# Patient Record
Sex: FEMALE | Race: WHITE | Employment: OTHER | URBAN - METROPOLITAN AREA
[De-identification: names, ages, dates, MRNs, and addresses within clinical notes are randomized per-mention and may not be internally consistent; named-entity substitution may affect disease eponyms.]

---

## 2017-01-03 ENCOUNTER — TELEPHONE (OUTPATIENT)
Dept: FAMILY MEDICINE CLINIC | Age: 72
End: 2017-01-03

## 2017-01-03 RX ORDER — ALBUTEROL SULFATE 90 UG/1
2 AEROSOL, METERED RESPIRATORY (INHALATION) EVERY 6 HOURS PRN
Qty: 3 INHALER | Refills: 3 | Status: SHIPPED | OUTPATIENT
Start: 2017-01-03 | End: 2020-03-12 | Stop reason: SDUPTHER

## 2017-01-09 DIAGNOSIS — K52.81 EOSINOPHILIC GASTRITIS: ICD-10-CM

## 2017-01-09 RX ORDER — PREDNISONE 10 MG/1
10 TABLET ORAL PRN
Qty: 100 TABLET | Refills: 3 | Status: CANCELLED | OUTPATIENT
Start: 2017-01-09

## 2017-01-10 RX ORDER — MONTELUKAST SODIUM 10 MG/1
10 TABLET ORAL NIGHTLY
Qty: 90 TABLET | Refills: 0 | Status: SHIPPED | OUTPATIENT
Start: 2017-01-10 | End: 2017-03-17 | Stop reason: SDUPTHER

## 2017-01-10 RX ORDER — WARFARIN SODIUM 5 MG/1
TABLET ORAL
Qty: 180 TABLET | Refills: 0 | Status: SHIPPED | OUTPATIENT
Start: 2017-01-10 | End: 2017-03-17 | Stop reason: SDUPTHER

## 2017-01-10 RX ORDER — POLYETHYLENE GLYCOL 3350 17 G/17G
17 POWDER, FOR SOLUTION ORAL DAILY
Qty: 3 BOTTLE | Refills: 0 | Status: SHIPPED | OUTPATIENT
Start: 2017-01-10 | End: 2017-03-17 | Stop reason: SDUPTHER

## 2017-01-18 ENCOUNTER — TELEPHONE (OUTPATIENT)
Dept: FAMILY MEDICINE CLINIC | Age: 72
End: 2017-01-18

## 2017-01-18 DIAGNOSIS — K52.81 EOSINOPHILIC GASTRITIS: ICD-10-CM

## 2017-01-18 RX ORDER — PREDNISONE 10 MG/1
10 TABLET ORAL PRN
Qty: 100 TABLET | Refills: 3 | Status: SHIPPED | OUTPATIENT
Start: 2017-01-18 | End: 2017-12-28 | Stop reason: SDUPTHER

## 2017-01-31 ENCOUNTER — OFFICE VISIT (OUTPATIENT)
Dept: FAMILY MEDICINE CLINIC | Age: 72
End: 2017-01-31

## 2017-01-31 VITALS
SYSTOLIC BLOOD PRESSURE: 134 MMHG | HEART RATE: 90 BPM | RESPIRATION RATE: 12 BRPM | DIASTOLIC BLOOD PRESSURE: 70 MMHG | OXYGEN SATURATION: 98 %

## 2017-01-31 DIAGNOSIS — E11.9 TYPE 2 DIABETES MELLITUS WITHOUT COMPLICATION, WITHOUT LONG-TERM CURRENT USE OF INSULIN (HCC): ICD-10-CM

## 2017-01-31 DIAGNOSIS — M71.22 BAKER CYST, LEFT: Primary | ICD-10-CM

## 2017-01-31 PROCEDURE — 99213 OFFICE O/P EST LOW 20 MIN: CPT | Performed by: FAMILY MEDICINE

## 2017-01-31 RX ORDER — PREDNISONE 10 MG/1
TABLET ORAL
Qty: 33 TABLET | Refills: 0
Start: 2017-01-31 | End: 2017-02-05

## 2017-02-07 ENCOUNTER — HOSPITAL ENCOUNTER (OUTPATIENT)
Dept: ENDOSCOPY | Age: 72
Discharge: HOME | End: 2017-02-07
Attending: INTERNAL MEDICINE | Admitting: INTERNAL MEDICINE

## 2017-02-07 LAB
ANION GAP SERPL CALCULATED.3IONS-SCNC: 16 MMOL/L (ref 3–16)
BUN BLDV-MCNC: 19 MG/DL (ref 7–20)
CALCIUM SERPL-MCNC: 9.4 MG/DL (ref 8.3–10.6)
CHLORIDE BLD-SCNC: 100 MMOL/L (ref 99–110)
CO2: 23 MMOL/L (ref 21–32)
CREAT SERPL-MCNC: 0.7 MG/DL (ref 0.6–1.2)
GFR AFRICAN AMERICAN: >60
GFR NON-AFRICAN AMERICAN: >60
GLUCOSE BLD-MCNC: 161 MG/DL (ref 70–99)
GLUCOSE BLD-MCNC: 168 MG/DL (ref 70–99)
GLUCOSE BLD-MCNC: 189 MG/DL (ref 70–99)
INR BLD: 0.97 (ref 0.85–1.16)
PERFORMED ON: ABNORMAL
PERFORMED ON: ABNORMAL
POTASSIUM SERPL-SCNC: 3.7 MMOL/L (ref 3.5–5.1)
PROTHROMBIN TIME: 11 SEC (ref 9.8–13)
SODIUM BLD-SCNC: 139 MMOL/L (ref 136–145)

## 2017-02-07 RX ORDER — FENTANYL CITRATE 50 UG/ML
50 INJECTION, SOLUTION INTRAMUSCULAR; INTRAVENOUS EVERY 5 MIN PRN
Status: DISCONTINUED | OUTPATIENT
Start: 2017-02-07 | End: 2017-02-08 | Stop reason: HOSPADM

## 2017-02-07 RX ORDER — MEPERIDINE HYDROCHLORIDE 25 MG/ML
12.5 INJECTION INTRAMUSCULAR; INTRAVENOUS; SUBCUTANEOUS EVERY 5 MIN PRN
Status: DISCONTINUED | OUTPATIENT
Start: 2017-02-07 | End: 2017-02-08 | Stop reason: HOSPADM

## 2017-02-07 RX ORDER — PROMETHAZINE HYDROCHLORIDE 25 MG/ML
6.25 INJECTION, SOLUTION INTRAMUSCULAR; INTRAVENOUS PRN
Status: DISCONTINUED | OUTPATIENT
Start: 2017-02-07 | End: 2017-02-08 | Stop reason: HOSPADM

## 2017-02-07 RX ORDER — SODIUM CHLORIDE 0.9 % (FLUSH) 0.9 %
10 SYRINGE (ML) INJECTION PRN
Status: DISCONTINUED | OUTPATIENT
Start: 2017-02-07 | End: 2017-02-08 | Stop reason: HOSPADM

## 2017-02-07 RX ORDER — OXYCODONE HYDROCHLORIDE 5 MG/1
5 TABLET ORAL PRN
Status: ACTIVE | OUTPATIENT
Start: 2017-02-07 | End: 2017-02-07

## 2017-02-07 RX ORDER — SODIUM CHLORIDE 9 MG/ML
INJECTION, SOLUTION INTRAVENOUS CONTINUOUS
Status: DISCONTINUED | OUTPATIENT
Start: 2017-02-07 | End: 2017-02-08 | Stop reason: HOSPADM

## 2017-02-07 RX ORDER — HYDROMORPHONE HCL 110MG/55ML
0.25 PATIENT CONTROLLED ANALGESIA SYRINGE INTRAVENOUS EVERY 5 MIN PRN
Status: DISCONTINUED | OUTPATIENT
Start: 2017-02-07 | End: 2017-02-08 | Stop reason: HOSPADM

## 2017-02-07 RX ORDER — DIPHENHYDRAMINE HYDROCHLORIDE 50 MG/ML
12.5 INJECTION INTRAMUSCULAR; INTRAVENOUS
Status: ACTIVE | OUTPATIENT
Start: 2017-02-07 | End: 2017-02-07

## 2017-02-07 RX ORDER — LABETALOL HYDROCHLORIDE 5 MG/ML
5 INJECTION, SOLUTION INTRAVENOUS EVERY 10 MIN PRN
Status: DISCONTINUED | OUTPATIENT
Start: 2017-02-07 | End: 2017-02-08 | Stop reason: HOSPADM

## 2017-02-07 RX ORDER — SODIUM CHLORIDE 0.9 % (FLUSH) 0.9 %
10 SYRINGE (ML) INJECTION EVERY 12 HOURS SCHEDULED
Status: DISCONTINUED | OUTPATIENT
Start: 2017-02-07 | End: 2017-02-08 | Stop reason: HOSPADM

## 2017-02-07 RX ORDER — OXYCODONE HYDROCHLORIDE 5 MG/1
10 TABLET ORAL PRN
Status: ACTIVE | OUTPATIENT
Start: 2017-02-07 | End: 2017-02-07

## 2017-02-07 RX ORDER — HYDROMORPHONE HCL 110MG/55ML
0.5 PATIENT CONTROLLED ANALGESIA SYRINGE INTRAVENOUS EVERY 5 MIN PRN
Status: DISCONTINUED | OUTPATIENT
Start: 2017-02-07 | End: 2017-02-08 | Stop reason: HOSPADM

## 2017-02-07 ASSESSMENT — ENCOUNTER SYMPTOMS: SHORTNESS OF BREATH: 0

## 2017-02-23 ENCOUNTER — TELEPHONE (OUTPATIENT)
Dept: FAMILY MEDICINE CLINIC | Age: 72
End: 2017-02-23

## 2017-03-17 RX ORDER — WARFARIN SODIUM 5 MG/1
TABLET ORAL
Qty: 180 TABLET | Refills: 1 | Status: SHIPPED | OUTPATIENT
Start: 2017-03-17 | End: 2017-12-28 | Stop reason: SDUPTHER

## 2017-03-17 RX ORDER — INSULIN DETEMIR 100 [IU]/ML
INJECTION, SOLUTION SUBCUTANEOUS
Qty: 90 ML | Refills: 1 | Status: SHIPPED | OUTPATIENT
Start: 2017-03-17 | End: 2017-08-28

## 2017-03-17 RX ORDER — POLYETHYLENE GLYCOL 3350 17 G/17G
POWDER, FOR SOLUTION ORAL
Qty: 1581 G | Refills: 1 | Status: SHIPPED | OUTPATIENT
Start: 2017-03-17 | End: 2017-12-28 | Stop reason: SDUPTHER

## 2017-03-17 RX ORDER — MONTELUKAST SODIUM 10 MG/1
TABLET ORAL
Qty: 90 TABLET | Refills: 1 | Status: SHIPPED | OUTPATIENT
Start: 2017-03-17 | End: 2017-12-28 | Stop reason: SDUPTHER

## 2017-04-27 ENCOUNTER — OFFICE VISIT (OUTPATIENT)
Dept: FAMILY MEDICINE CLINIC | Age: 72
End: 2017-04-27

## 2017-04-27 VITALS
HEIGHT: 67 IN | DIASTOLIC BLOOD PRESSURE: 74 MMHG | WEIGHT: 227.6 LBS | SYSTOLIC BLOOD PRESSURE: 138 MMHG | HEART RATE: 92 BPM | BODY MASS INDEX: 35.72 KG/M2 | OXYGEN SATURATION: 96 %

## 2017-04-27 DIAGNOSIS — M71.22 BAKER CYST, LEFT: ICD-10-CM

## 2017-04-27 DIAGNOSIS — K52.81 EOSINOPHILIC GASTRITIS: ICD-10-CM

## 2017-04-27 DIAGNOSIS — I47.1 PAROXYSMAL SVT (SUPRAVENTRICULAR TACHYCARDIA) (HCC): ICD-10-CM

## 2017-04-27 DIAGNOSIS — Z79.01 LONG TERM CURRENT USE OF ANTICOAGULANT: ICD-10-CM

## 2017-04-27 DIAGNOSIS — M15.9 GENERALIZED OSTEOARTHROSIS, INVOLVING MULTIPLE SITES: ICD-10-CM

## 2017-04-27 DIAGNOSIS — Z79.4 TYPE 2 DIABETES MELLITUS WITH HYPERGLYCEMIA, WITH LONG-TERM CURRENT USE OF INSULIN (HCC): Primary | ICD-10-CM

## 2017-04-27 DIAGNOSIS — E11.65 TYPE 2 DIABETES MELLITUS WITH HYPERGLYCEMIA, WITH LONG-TERM CURRENT USE OF INSULIN (HCC): Primary | ICD-10-CM

## 2017-04-27 LAB
INTERNATIONAL NORMALIZATION RATIO, POC: 2.5
PROTHROMBIN TIME, POC: NORMAL

## 2017-04-27 PROCEDURE — G8510 SCR DEP NEG, NO PLAN REQD: HCPCS | Performed by: FAMILY MEDICINE

## 2017-04-27 PROCEDURE — 99214 OFFICE O/P EST MOD 30 MIN: CPT | Performed by: FAMILY MEDICINE

## 2017-04-27 PROCEDURE — 85610 PROTHROMBIN TIME: CPT | Performed by: FAMILY MEDICINE

## 2017-04-27 PROCEDURE — 3288F FALL RISK ASSESSMENT DOCD: CPT | Performed by: FAMILY MEDICINE

## 2017-04-27 RX ORDER — BLOOD-GLUCOSE METER
EACH MISCELLANEOUS
Qty: 1 DEVICE | Refills: 3 | Status: SHIPPED | OUTPATIENT
Start: 2017-04-27 | End: 2017-08-28 | Stop reason: SDUPTHER

## 2017-04-27 RX ORDER — CELECOXIB 200 MG/1
200 CAPSULE ORAL DAILY
Qty: 30 CAPSULE | Refills: 3 | Status: SHIPPED | OUTPATIENT
Start: 2017-04-27 | End: 2018-01-17 | Stop reason: ALTCHOICE

## 2017-04-27 ASSESSMENT — PATIENT HEALTH QUESTIONNAIRE - PHQ9
1. LITTLE INTEREST OR PLEASURE IN DOING THINGS: 0
SUM OF ALL RESPONSES TO PHQ9 QUESTIONS 1 & 2: 0
2. FEELING DOWN, DEPRESSED OR HOPELESS: 0
SUM OF ALL RESPONSES TO PHQ QUESTIONS 1-9: 0

## 2017-05-16 LAB
INR BLD: 2
PROTHROMBIN TIME: 20 SEC (ref 9–11.5)

## 2017-05-17 ENCOUNTER — ANTI-COAG VISIT (OUTPATIENT)
Dept: FAMILY MEDICINE CLINIC | Age: 72
End: 2017-05-17

## 2017-05-17 ENCOUNTER — TELEPHONE (OUTPATIENT)
Dept: FAMILY MEDICINE CLINIC | Age: 72
End: 2017-05-17

## 2017-05-17 LAB — INR BLD: 2

## 2017-06-07 ENCOUNTER — TELEPHONE (OUTPATIENT)
Dept: FAMILY MEDICINE CLINIC | Age: 72
End: 2017-06-07

## 2017-06-07 RX ORDER — PENICILLIN V POTASSIUM 500 MG/1
500 TABLET ORAL 3 TIMES DAILY
Qty: 30 TABLET | Refills: 0 | Status: SHIPPED | OUTPATIENT
Start: 2017-06-07 | End: 2017-06-17

## 2017-07-25 ENCOUNTER — OFFICE VISIT (OUTPATIENT)
Dept: FAMILY MEDICINE CLINIC | Age: 72
End: 2017-07-25

## 2017-07-25 VITALS
TEMPERATURE: 97.5 F | SYSTOLIC BLOOD PRESSURE: 130 MMHG | DIASTOLIC BLOOD PRESSURE: 80 MMHG | OXYGEN SATURATION: 96 % | HEART RATE: 90 BPM

## 2017-07-25 DIAGNOSIS — J40 BRONCHITIS: Primary | ICD-10-CM

## 2017-07-25 PROCEDURE — 99213 OFFICE O/P EST LOW 20 MIN: CPT | Performed by: PHYSICIAN ASSISTANT

## 2017-07-25 RX ORDER — CEPHALEXIN 500 MG/1
500 CAPSULE ORAL 3 TIMES DAILY
Qty: 30 CAPSULE | Refills: 0 | Status: SHIPPED | OUTPATIENT
Start: 2017-07-25 | End: 2017-08-28

## 2017-07-25 ASSESSMENT — ENCOUNTER SYMPTOMS
SHORTNESS OF BREATH: 0
SORE THROAT: 1
NAUSEA: 0
COUGH: 1
WHEEZING: 0
SINUS PRESSURE: 0
RHINORRHEA: 0
VOMITING: 0
TROUBLE SWALLOWING: 0

## 2017-08-28 ENCOUNTER — OFFICE VISIT (OUTPATIENT)
Dept: FAMILY MEDICINE CLINIC | Age: 72
End: 2017-08-28

## 2017-08-28 VITALS
DIASTOLIC BLOOD PRESSURE: 80 MMHG | BODY MASS INDEX: 36.25 KG/M2 | HEART RATE: 86 BPM | SYSTOLIC BLOOD PRESSURE: 136 MMHG | OXYGEN SATURATION: 96 % | WEIGHT: 228 LBS

## 2017-08-28 DIAGNOSIS — I47.1 PAROXYSMAL SVT (SUPRAVENTRICULAR TACHYCARDIA) (HCC): ICD-10-CM

## 2017-08-28 DIAGNOSIS — I10 ESSENTIAL HYPERTENSION: ICD-10-CM

## 2017-08-28 DIAGNOSIS — K52.81 EOSINOPHILIC GASTRITIS: ICD-10-CM

## 2017-08-28 DIAGNOSIS — F43.21 SITUATIONAL DEPRESSION: ICD-10-CM

## 2017-08-28 DIAGNOSIS — E11.65 TYPE 2 DIABETES MELLITUS WITH HYPERGLYCEMIA, WITH LONG-TERM CURRENT USE OF INSULIN (HCC): Primary | ICD-10-CM

## 2017-08-28 DIAGNOSIS — Z79.4 TYPE 2 DIABETES MELLITUS WITH HYPERGLYCEMIA, WITH LONG-TERM CURRENT USE OF INSULIN (HCC): Primary | ICD-10-CM

## 2017-08-28 DIAGNOSIS — M15.9 GENERALIZED OSTEOARTHROSIS, INVOLVING MULTIPLE SITES: ICD-10-CM

## 2017-08-28 PROCEDURE — 99214 OFFICE O/P EST MOD 30 MIN: CPT | Performed by: FAMILY MEDICINE

## 2017-08-28 RX ORDER — BLOOD-GLUCOSE METER
EACH MISCELLANEOUS
Qty: 1 DEVICE | Refills: 3 | Status: SHIPPED | OUTPATIENT
Start: 2017-08-28 | End: 2018-04-27

## 2017-08-28 RX ORDER — OXYCODONE HYDROCHLORIDE AND ACETAMINOPHEN 5; 325 MG/1; MG/1
1 TABLET ORAL EVERY 6 HOURS PRN
Qty: 100 TABLET | Refills: 0 | Status: SHIPPED | OUTPATIENT
Start: 2017-08-28 | End: 2017-12-28 | Stop reason: SDUPTHER

## 2017-08-28 ASSESSMENT — PATIENT HEALTH QUESTIONNAIRE - PHQ9
SUM OF ALL RESPONSES TO PHQ9 QUESTIONS 1 & 2: 0
1. LITTLE INTEREST OR PLEASURE IN DOING THINGS: 0
SUM OF ALL RESPONSES TO PHQ QUESTIONS 1-9: 0
2. FEELING DOWN, DEPRESSED OR HOPELESS: 0

## 2017-08-29 LAB
BASOPHILS ABSOLUTE: 41 CELLS/UL (ref 0–200)
BASOPHILS RELATIVE PERCENT: 0.6 %
BUN / CREAT RATIO: 16 (CALC) (ref 6–22)
BUN BLDV-MCNC: 15 MG/DL (ref 7–25)
CALCIUM SERPL-MCNC: 9.3 MG/DL (ref 8.6–10.4)
CHLORIDE BLD-SCNC: 104 MMOL/L (ref 98–110)
CO2: 26 MMOL/L (ref 20–31)
CREAT SERPL-MCNC: 0.94 MG/DL (ref 0.6–0.93)
DIGOXIN LEVEL: 0.7 MCG/L (ref 0.8–2)
EOSINOPHILS ABSOLUTE: 511 CELLS/UL (ref 15–500)
EOSINOPHILS RELATIVE PERCENT: 7.4 %
GFR AFRICAN AMERICAN: 71 ML/MIN/1.73M2
GFR SERPL CREATININE-BSD FRML MDRD: 61 ML/MIN/1.73M2
GLUCOSE BLD-MCNC: 192 MG/DL (ref 65–99)
HBA1C MFR BLD: 9.4 % OF TOTAL HGB
HCT VFR BLD CALC: 42.1 % (ref 35–45)
HEMOGLOBIN: 14.6 G/DL (ref 11.7–15.5)
LYMPHOCYTES ABSOLUTE: 2098 CELLS/UL (ref 850–3900)
LYMPHOCYTES RELATIVE PERCENT: 30.4 %
MCH RBC QN AUTO: 30.5 PG (ref 27–33)
MCHC RBC AUTO-ENTMCNC: 34.7 G/DL (ref 32–36)
MCV RBC AUTO: 88.1 FL (ref 80–100)
MONOCYTES ABSOLUTE: 531 CELLS/UL (ref 200–950)
MONOCYTES RELATIVE PERCENT: 7.7 %
NEUTROPHILS ABSOLUTE: 3719 CELLS/UL (ref 1500–7800)
PDW BLD-RTO: 13.3 % (ref 11–15)
PLATELET # BLD: 209 THOUSAND/UL (ref 140–400)
PMV BLD AUTO: 12 FL (ref 7.5–12.5)
POTASSIUM SERPL-SCNC: 4.1 MMOL/L (ref 3.5–5.3)
RBC # BLD: 4.78 MILLION/UL (ref 3.8–5.1)
SEGMENTED NEUTROPHILS RELATIVE PERCENT: 53.9 %
SODIUM BLD-SCNC: 140 MMOL/L (ref 135–146)
WBC # BLD: 6.9 THOUSAND/UL (ref 3.8–10.8)

## 2017-12-28 ENCOUNTER — OFFICE VISIT (OUTPATIENT)
Dept: FAMILY MEDICINE CLINIC | Age: 72
End: 2017-12-28

## 2017-12-28 VITALS
HEIGHT: 67 IN | RESPIRATION RATE: 16 BRPM | HEART RATE: 92 BPM | SYSTOLIC BLOOD PRESSURE: 130 MMHG | OXYGEN SATURATION: 96 % | BODY MASS INDEX: 36.79 KG/M2 | DIASTOLIC BLOOD PRESSURE: 80 MMHG | WEIGHT: 234.38 LBS

## 2017-12-28 DIAGNOSIS — E78.5 HYPERLIPIDEMIA, UNSPECIFIED HYPERLIPIDEMIA TYPE: ICD-10-CM

## 2017-12-28 DIAGNOSIS — Z79.01 LONG TERM CURRENT USE OF ANTICOAGULANT: ICD-10-CM

## 2017-12-28 DIAGNOSIS — Z79.4 TYPE 2 DIABETES MELLITUS WITH HYPERGLYCEMIA, WITH LONG-TERM CURRENT USE OF INSULIN (HCC): Primary | ICD-10-CM

## 2017-12-28 DIAGNOSIS — M15.9 GENERALIZED OSTEOARTHROSIS, INVOLVING MULTIPLE SITES: ICD-10-CM

## 2017-12-28 DIAGNOSIS — K52.81 EOSINOPHILIC GASTRITIS: ICD-10-CM

## 2017-12-28 DIAGNOSIS — E11.65 TYPE 2 DIABETES MELLITUS WITH HYPERGLYCEMIA, WITH LONG-TERM CURRENT USE OF INSULIN (HCC): Primary | ICD-10-CM

## 2017-12-28 DIAGNOSIS — I10 ESSENTIAL HYPERTENSION: ICD-10-CM

## 2017-12-28 LAB
INTERNATIONAL NORMALIZATION RATIO, POC: 2.1
PROTHROMBIN TIME, POC: NORMAL

## 2017-12-28 PROCEDURE — 85610 PROTHROMBIN TIME: CPT | Performed by: FAMILY MEDICINE

## 2017-12-28 PROCEDURE — 99214 OFFICE O/P EST MOD 30 MIN: CPT | Performed by: FAMILY MEDICINE

## 2017-12-28 RX ORDER — PREDNISONE 10 MG/1
10 TABLET ORAL PRN
Qty: 100 TABLET | Refills: 3 | Status: ON HOLD | OUTPATIENT
Start: 2017-12-28 | End: 2018-10-11 | Stop reason: HOSPADM

## 2017-12-28 RX ORDER — DIGOXIN 250 MCG
375 TABLET ORAL DAILY
Qty: 145 TABLET | Refills: 3 | Status: ON HOLD | OUTPATIENT
Start: 2017-12-28 | End: 2018-09-04 | Stop reason: HOSPADM

## 2017-12-28 RX ORDER — HYDROCHLOROTHIAZIDE 25 MG/1
25 TABLET ORAL DAILY
Qty: 90 TABLET | Refills: 3 | Status: ON HOLD | OUTPATIENT
Start: 2017-12-28 | End: 2018-08-30 | Stop reason: HOSPADM

## 2017-12-28 RX ORDER — SPIRONOLACTONE 50 MG/1
50 TABLET, FILM COATED ORAL DAILY
Qty: 90 TABLET | Refills: 3 | Status: ON HOLD | OUTPATIENT
Start: 2017-12-28 | End: 2018-08-30 | Stop reason: HOSPADM

## 2017-12-28 RX ORDER — OXYCODONE HYDROCHLORIDE AND ACETAMINOPHEN 5; 325 MG/1; MG/1
1 TABLET ORAL EVERY 6 HOURS PRN
Qty: 100 TABLET | Refills: 0 | Status: SHIPPED | OUTPATIENT
Start: 2017-12-28 | End: 2018-01-04

## 2017-12-28 RX ORDER — MONTELUKAST SODIUM 10 MG/1
TABLET ORAL
Qty: 90 TABLET | Refills: 1 | Status: SHIPPED | OUTPATIENT
Start: 2017-12-28 | End: 2018-04-27 | Stop reason: SDUPTHER

## 2017-12-28 RX ORDER — POLYETHYLENE GLYCOL 3350 17 G/17G
POWDER, FOR SOLUTION ORAL
Qty: 1581 G | Refills: 3 | Status: SHIPPED | OUTPATIENT
Start: 2017-12-28 | End: 2019-01-29 | Stop reason: SDUPTHER

## 2017-12-28 RX ORDER — WARFARIN SODIUM 5 MG/1
TABLET ORAL
Qty: 180 TABLET | Refills: 1 | Status: SHIPPED | OUTPATIENT
Start: 2017-12-28 | End: 2018-04-27 | Stop reason: SDUPTHER

## 2017-12-28 RX ORDER — DILTIAZEM HYDROCHLORIDE 360 MG/1
360 CAPSULE, EXTENDED RELEASE ORAL DAILY
Qty: 90 CAPSULE | Refills: 3 | Status: ON HOLD | OUTPATIENT
Start: 2017-12-28 | End: 2018-09-04 | Stop reason: HOSPADM

## 2017-12-28 RX ORDER — PRAVASTATIN SODIUM 40 MG
40 TABLET ORAL NIGHTLY
Qty: 90 TABLET | Refills: 3 | Status: SHIPPED | OUTPATIENT
Start: 2017-12-28 | End: 2018-10-19

## 2017-12-28 RX ORDER — GLIMEPIRIDE 4 MG/1
4 TABLET ORAL
Qty: 90 TABLET | Refills: 3 | Status: SHIPPED | OUTPATIENT
Start: 2017-12-28 | End: 2018-09-19

## 2017-12-28 NOTE — PROGRESS NOTES
Subjective:      Patient ID: Romana Prost is a 71 y.o. female. HPI Patient presents for re-evaluation of chronic health problems. Patient overall feels she's doing the same. She still having significant arthritis especially with her left knee. She does not want to do surgical intervention. She's very limited in her activity because of this. She's had several bouts of eosinophilic gastritis and has used prednisone on a burst that she typically takes for about a week at a time. She's had no hypoglycemic episodes. She doesn't particularly like the Lantus device but she has been compliant using the medication. She also has a soreness on the inner aspect of her left thigh that is been there for some time. The discomfort does not cause her to stop activity. She was concerned about possible blood clot. Patient has not had her Coumadin level tested for some time although she is compliant with medication. Eye exam current (within one year): Yes    Checks sugars at home: no  Home blood sugar records: patient does not test  Any episodes of hypoglycemia?  No    Current medication use: taking as prescribed  Medication side effects: none     Current diet: in general, an \"unhealthy\" diet  Current exercise:not active    Review of Systems  Patient Active Problem List   Diagnosis    Long term current use of anticoagulant    Mixed hyperlipidemia    Essential hypertension    Generalized osteoarthrosis, involving multiple sites    Eosinophilic gastritis    Paroxysmal SVT (supraventricular tachycardia) (HCC)    B12 deficiency    History of pulmonary embolism    Left ovarian cyst    Lipoma    Multiple pulmonary nodules    Type 2 diabetes mellitus with hyperglycemia, with long-term current use of insulin [facility])       Outpatient Prescriptions Marked as Taking for the 12/28/17 encounter (Office Visit) with Adiel Marsh MD   Medication Sig Dispense Refill    glucose blood VI test strips (PRECISION XTRA TEST [Fluocinolone] Shortness Of Breath    Diovan [Valsartan] Shortness Of Breath    Flagyl [Metronidazole] Shortness Of Breath     Has taken diflucan at home 12/7/15    Metformin And Related [Metformin And Related] Shortness Of Breath    Benazepril      Other reaction(s): Not Recorded    Saxagliptin      Other reaction(s): Not Recorded    Levaquin [Levofloxacin] Rash       Social History   Substance Use Topics    Smoking status: Never Smoker    Smokeless tobacco: Never Used    Alcohol use No       /80 (Site: Left Arm, Position: Sitting, Cuff Size: Large Adult)   Pulse 92   Resp 16   Ht 5' 6.5\" (1.689 m)   Wt 234 lb 6 oz (106.3 kg)   SpO2 96%   BMI 37.26 kg/m²     Objective:   Physical Exam   Constitutional: She appears well-developed and well-nourished. She is cooperative. Neck: Carotid bruit is not present. Cardiovascular: Normal rate, regular rhythm and normal heart sounds. No murmur heard. Pulses:       Dorsalis pedis pulses are 2+ on the right side, and 2+ on the left side. Posterior tibial pulses are 2+ on the right side, and 2+ on the left side. Pulmonary/Chest: Effort normal and breath sounds normal.   Abdominal: Soft. Normal appearance and bowel sounds are normal. She exhibits no distension and no mass. There is no hepatosplenomegaly. There is no tenderness. There is no rigidity, no rebound, no guarding and no CVA tenderness. No hernia. Musculoskeletal: She exhibits no edema. Right knee: She exhibits deformity (Gross crepitus). She exhibits normal range of motion. No tenderness found. Left knee: She exhibits decreased range of motion and deformity (Gross crepitus and Baker cyst). Tenderness found. Right foot: Normal.        Left foot: Normal.   Neurological: She is alert. She has normal reflexes. No sensory deficit. 12 point monofilament test normal    Psychiatric: She has a normal mood and affect.  Her behavior is normal. Judgment and thought content normal.       Assessment:      Loreto Martínez was seen today for follow-up. Diagnoses and all orders for this visit:    Type 2 diabetes mellitus with hyperglycemia, with long-term current use of insulin ([address]  -     Diabetic Foot Exam    Long term current use of anticoagulant  -     POCT INR    Eosinophilic gastritis  -     predniSONE (DELTASONE) 10 MG tablet; Take 1 tablet by mouth as needed (eosinophilic gastritis)    Essential hypertension  -     diltiazem (TIAZAC) 360 MG extended release capsule; Take 1 capsule by mouth daily  -     hydrochlorothiazide (HYDRODIURIL) 25 MG tablet; Take 1 tablet by mouth daily  -     spironolactone (ALDACTONE) 50 MG tablet; Take 1 tablet by mouth daily    Hyperlipidemia, unspecified hyperlipidemia type  -     pravastatin (PRAVACHOL) 40 MG tablet; Take 1 tablet by mouth nightly    Generalized osteoarthrosis, involving multiple sites  -     oxyCODONE-acetaminophen (PERCOCET) 5-325 MG per tablet; Take 1 tablet by mouth every 6 hours as needed for Pain . Other orders  -     Insulin Pen Needle (BD PEN NEEDLE JANNET U/F) 32G X 4 MM MISC; 1 each by Does not apply route 4 times daily  -     glimepiride (AMARYL) 4 MG tablet; Take 1 tablet by mouth every morning (before breakfast)  -     montelukast (SINGULAIR) 10 MG tablet; TAKE 1 TABLET NIGHTLY  -     digoxin (LANOXIN) 250 MCG tablet; Take 1.5 tablets by mouth daily  -     warfarin (COUMADIN) 5 MG tablet; TAKE ONE AND ONE-HALF TABLETS (7.5 MG) DAILY  -     polyethylene glycol (GLYCOLAX) powder; FILL DOSING CUP TO MARKED LINE (17 GRAMS) THEN MIX IN LIQUID AND DRINK DAILY  -     exenatide (BYETTA 10 MCG PEN) 10 MCG/0.04ML injection; Inject 0.04 mLs into the skin 2 times daily (with meals)  -     insulin glargine (LANTUS) 100 UNIT/ML injection pen; Inject 50 Units into the skin 2 times daily    OARRS report checked          Plan:      Pt's DM not controlled but improved since changing to Lantus insulin & reviewed labs with patient. discussed increasing the Lantus dosage the patient does not want to do this currently. Discussed orthopedic consultation for knee replacement when she's ready. Maintain current medical management and continue with prednisone when necessary for eosinophilic gastritis    Patient received counseling on the following healthy behaviors: nutrition and exercise     Patient given educational materials     Discussed use, benefit, and side effects of prescribed medications. Barriers to medication compliance addressed. All patient questions answered. Pt voiced understanding. Patient needs RTC in 4 months. Please note that this chart was generated using Dragon dictation software. Although every effort was made to ensure the accuracy of this automated transcription, some errors in transcription may have occurred.

## 2018-01-08 ENCOUNTER — OFFICE VISIT (OUTPATIENT)
Dept: FAMILY MEDICINE CLINIC | Age: 73
End: 2018-01-08

## 2018-01-08 VITALS
BODY MASS INDEX: 36.25 KG/M2 | OXYGEN SATURATION: 96 % | SYSTOLIC BLOOD PRESSURE: 128 MMHG | TEMPERATURE: 99 F | HEART RATE: 101 BPM | DIASTOLIC BLOOD PRESSURE: 80 MMHG | WEIGHT: 228 LBS

## 2018-01-08 DIAGNOSIS — K11.20 PAROTIDITIS: Primary | ICD-10-CM

## 2018-01-08 PROCEDURE — 99213 OFFICE O/P EST LOW 20 MIN: CPT | Performed by: FAMILY MEDICINE

## 2018-01-08 RX ORDER — FLUCONAZOLE 150 MG/1
150 TABLET ORAL ONCE
Qty: 1 TABLET | Refills: 0 | Status: SHIPPED | OUTPATIENT
Start: 2018-01-08 | End: 2018-01-08

## 2018-01-08 RX ORDER — PENICILLIN V POTASSIUM 500 MG/1
500 TABLET ORAL 4 TIMES DAILY
Qty: 40 TABLET | Refills: 0 | Status: ON HOLD | OUTPATIENT
Start: 2018-01-08 | End: 2018-01-16 | Stop reason: HOSPADM

## 2018-01-08 ASSESSMENT — ENCOUNTER SYMPTOMS
SORE THROAT: 1
COUGH: 1

## 2018-01-11 ENCOUNTER — TELEPHONE (OUTPATIENT)
Dept: FAMILY MEDICINE CLINIC | Age: 73
End: 2018-01-11

## 2018-01-11 PROBLEM — K11.21 ACUTE PAROTITIS: Status: ACTIVE | Noted: 2018-01-11

## 2018-01-11 RX ORDER — CEFDINIR 300 MG/1
300 CAPSULE ORAL 2 TIMES DAILY
Qty: 20 CAPSULE | Refills: 0 | Status: ON HOLD | OUTPATIENT
Start: 2018-01-11 | End: 2018-01-16 | Stop reason: HOSPADM

## 2018-01-12 PROBLEM — J45.909 ASTHMA: Status: ACTIVE | Noted: 2018-01-12

## 2018-01-12 PROBLEM — E11.9 DM2 (DIABETES MELLITUS, TYPE 2) (HCC): Status: ACTIVE | Noted: 2017-04-27

## 2018-01-17 ENCOUNTER — TELEPHONE (OUTPATIENT)
Dept: PHARMACY | Facility: CLINIC | Age: 73
End: 2018-01-17

## 2018-01-17 DIAGNOSIS — K11.21 ACUTE PAROTITIS: Primary | ICD-10-CM

## 2018-01-17 PROCEDURE — 1111F DSCHRG MED/CURRENT MED MERGE: CPT | Performed by: FAMILY MEDICINE

## 2018-01-17 RX ORDER — DOCUSATE SODIUM 100 MG/1
100 CAPSULE, LIQUID FILLED ORAL DAILY
COMMUNITY
End: 2018-04-27

## 2018-01-17 NOTE — TELEPHONE ENCOUNTER
Medication Sig    docusate sodium (COLACE) 100 MG capsule  · Added to home medication list. Pt takes OTC. Take 100 mg by mouth daily    diltiazem (TIAZAC) 360 MG extended release capsule Take 1 capsule by mouth daily    glimepiride (AMARYL) 4 MG tablet Take 1 tablet by mouth every morning (before breakfast)    hydrochlorothiazide (HYDRODIURIL) 25 MG tablet Take 1 tablet by mouth daily    spironolactone (ALDACTONE) 50 MG tablet Take 1 tablet by mouth daily    pravastatin (PRAVACHOL) 40 MG tablet Take 1 tablet by mouth nightly    montelukast (SINGULAIR) 10 MG tablet TAKE 1 TABLET NIGHTLY    digoxin (LANOXIN) 250 MCG tablet  · Note to PCP regarding dose. Take 1.5 tablets by mouth daily    warfarin (COUMADIN) 5 MG tablet  · Pt's INR is monitored by PCP. TAKE ONE AND ONE-HALF TABLETS (7.5 MG) DAILY    predniSONE (DELTASONE) 10 MG tablet Take 1 tablet by mouth as needed (eosinophilic gastritis)    polyethylene glycol (GLYCOLAX) powder FILL DOSING CUP TO MARKED LINE (17 GRAMS) THEN MIX IN LIQUID AND DRINK DAILY    exenatide (BYETTA 10 MCG PEN) 10 MCG/0.04ML injection Inject 0.04 mLs into the skin 2 times daily (with meals)    insulin glargine (LANTUS) 100 UNIT/ML injection pen Inject 50 Units into the skin 2 times daily    nystatin (MYCOSTATIN) 214634 UNIT/ML suspension 1 teaspoon 4 times daily x 5 d    albuterol sulfate HFA (PROAIR HFA) 108 (90 BASE) MCG/ACT inhaler Inhale 2 puffs into the lungs every 6 hours as needed for Wheezing     These are the medications you have told us you were taking at home, STOP taking them after you leave the hospital   · Omnicef and Penicillin - patient is no longer taking these medications. Meds to Beds:No    Estimated Creatinine Clearance: 79 mL/min (based on SCr of 0.8 mg/dL). Assessment/Plan:  - Medication reconciliation completed. Number of medications reviewed: 18    - Pt is taking medications as directed by discharging physician.    Number of discrepancies: 3. Identified medication discrepancies/issues:   · Category 1 (0)  · Category 2 (0)  · Category 3 (1):  1. Digoxin - patient is taking 375 mcg daily, which is a high dose based on her age. Patient should only be taking 125 mcg daily. Further, digoxin should be avoided as first-line agent in elderly patients with A.Fib. Note routed to PCP to consider dose reduction. · Category 4 (1):   1. Celebrex - patient states that she no longer takes this medication. Removed from home medication list.    2.   Colace - added to home medication list. Patient takes OTC.     - [facility] active medication list updated:  · Medications Added (1):  Colace  · Medications Removed (1):  Celebrex  · Medications Changed (0)    - Identified Potential Medication Interactions: No clinically significant interactions identified via Bex Interaction Analysis as category D or higher.    - Renal Dosing: No renal adjustments necessary.    - Follow up appointment date (7 days for more severe illness, 14 days for others):    · Patient encouraged to schedule follow up with PCP.      Thank you,    Roldan Harrison, PharmD, [address]  Phone: [phone] or [phone], option 7

## 2018-01-23 ENCOUNTER — OFFICE VISIT (OUTPATIENT)
Dept: ENT CLINIC | Age: 73
End: 2018-01-23

## 2018-01-23 VITALS — SYSTOLIC BLOOD PRESSURE: 120 MMHG | DIASTOLIC BLOOD PRESSURE: 66 MMHG | HEART RATE: 88 BPM

## 2018-01-23 DIAGNOSIS — K11.21 ACUTE PAROTITIS: Primary | ICD-10-CM

## 2018-01-23 PROCEDURE — 99213 OFFICE O/P EST LOW 20 MIN: CPT | Performed by: OTOLARYNGOLOGY

## 2018-02-03 ENCOUNTER — TELEPHONE (OUTPATIENT)
Dept: FAMILY MEDICINE CLINIC | Age: 73
End: 2018-02-03

## 2018-02-03 RX ORDER — AMOXICILLIN AND CLAVULANATE POTASSIUM 875; 125 MG/1; MG/1
1 TABLET, FILM COATED ORAL 2 TIMES DAILY
Qty: 20 TABLET | Refills: 0 | Status: SHIPPED | OUTPATIENT
Start: 2018-02-03 | End: 2018-02-13

## 2018-02-03 NOTE — TELEPHONE ENCOUNTER
Augmentin 875mg BID x 10 days  Tell her needs to f/u with her ENT again next week or come in here to be rechecked.

## 2018-03-05 ENCOUNTER — OFFICE VISIT (OUTPATIENT)
Dept: FAMILY MEDICINE CLINIC | Age: 73
End: 2018-03-05

## 2018-03-05 VITALS — OXYGEN SATURATION: 96 % | DIASTOLIC BLOOD PRESSURE: 72 MMHG | HEART RATE: 80 BPM | SYSTOLIC BLOOD PRESSURE: 152 MMHG

## 2018-03-05 DIAGNOSIS — R92.8 ABNORMAL MAMMOGRAM: ICD-10-CM

## 2018-03-05 DIAGNOSIS — Z79.01 LONG TERM CURRENT USE OF ANTICOAGULANT: ICD-10-CM

## 2018-03-05 DIAGNOSIS — K52.81 EOSINOPHILIC GASTRITIS: ICD-10-CM

## 2018-03-05 DIAGNOSIS — Z86.711 HISTORY OF PULMONARY EMBOLISM: ICD-10-CM

## 2018-03-05 DIAGNOSIS — I47.1 PAROXYSMAL SVT (SUPRAVENTRICULAR TACHYCARDIA) (HCC): ICD-10-CM

## 2018-03-05 DIAGNOSIS — I49.9 IRREGULAR HEART BEAT: Primary | ICD-10-CM

## 2018-03-05 LAB
INTERNATIONAL NORMALIZATION RATIO, POC: 3.6
PROTHROMBIN TIME, POC: NORMAL

## 2018-03-05 PROCEDURE — 93000 ELECTROCARDIOGRAM COMPLETE: CPT | Performed by: FAMILY MEDICINE

## 2018-03-05 PROCEDURE — 85610 PROTHROMBIN TIME: CPT | Performed by: FAMILY MEDICINE

## 2018-03-05 PROCEDURE — 99214 OFFICE O/P EST MOD 30 MIN: CPT | Performed by: FAMILY MEDICINE

## 2018-03-21 ENCOUNTER — ANTI-COAG VISIT (OUTPATIENT)
Dept: FAMILY MEDICINE CLINIC | Age: 73
End: 2018-03-21

## 2018-04-03 ENCOUNTER — ANTI-COAG VISIT (OUTPATIENT)
Dept: FAMILY MEDICINE CLINIC | Age: 73
End: 2018-04-03

## 2018-04-27 ENCOUNTER — OFFICE VISIT (OUTPATIENT)
Dept: FAMILY MEDICINE CLINIC | Age: 73
End: 2018-04-27

## 2018-04-27 VITALS
SYSTOLIC BLOOD PRESSURE: 138 MMHG | DIASTOLIC BLOOD PRESSURE: 80 MMHG | WEIGHT: 231.2 LBS | BODY MASS INDEX: 35.15 KG/M2 | HEART RATE: 99 BPM | OXYGEN SATURATION: 95 %

## 2018-04-27 DIAGNOSIS — E11.8 TYPE 2 DIABETES MELLITUS WITH COMPLICATION, WITH LONG-TERM CURRENT USE OF INSULIN (HCC): Primary | ICD-10-CM

## 2018-04-27 DIAGNOSIS — E78.2 MIXED HYPERLIPIDEMIA: ICD-10-CM

## 2018-04-27 DIAGNOSIS — Z79.4 TYPE 2 DIABETES MELLITUS WITH COMPLICATION, WITH LONG-TERM CURRENT USE OF INSULIN (HCC): Primary | ICD-10-CM

## 2018-04-27 DIAGNOSIS — B35.1 TINEA UNGUIUM: ICD-10-CM

## 2018-04-27 DIAGNOSIS — I47.1 PAROXYSMAL SVT (SUPRAVENTRICULAR TACHYCARDIA) (HCC): ICD-10-CM

## 2018-04-27 DIAGNOSIS — I10 ESSENTIAL HYPERTENSION: ICD-10-CM

## 2018-04-27 DIAGNOSIS — M15.9 GENERALIZED OSTEOARTHROSIS, INVOLVING MULTIPLE SITES: ICD-10-CM

## 2018-04-27 DIAGNOSIS — K52.81 EOSINOPHILIC GASTRITIS: ICD-10-CM

## 2018-04-27 PROCEDURE — 99214 OFFICE O/P EST MOD 30 MIN: CPT | Performed by: FAMILY MEDICINE

## 2018-04-27 RX ORDER — WARFARIN SODIUM 5 MG/1
TABLET ORAL
Qty: 130 TABLET | Refills: 3 | Status: ON HOLD | OUTPATIENT
Start: 2018-04-27 | End: 2018-10-11 | Stop reason: HOSPADM

## 2018-04-27 RX ORDER — MONTELUKAST SODIUM 10 MG/1
TABLET ORAL
Qty: 90 TABLET | Refills: 3 | Status: SHIPPED | OUTPATIENT
Start: 2018-04-27 | End: 2019-04-05 | Stop reason: SDUPTHER

## 2018-04-27 RX ORDER — TERBINAFINE HYDROCHLORIDE 250 MG/1
250 TABLET ORAL DAILY
Qty: 90 TABLET | Refills: 0 | Status: SHIPPED | OUTPATIENT
Start: 2018-04-27 | End: 2018-07-17

## 2018-05-31 ENCOUNTER — ANTI-COAG VISIT (OUTPATIENT)
Dept: FAMILY MEDICINE CLINIC | Age: 73
End: 2018-05-31

## 2018-05-31 LAB
INR BLD: 1.4
PROTHROMBIN TIME: 14.8 SEC (ref 9–11.5)

## 2018-06-01 PROCEDURE — 93228 REMOTE 30 DAY ECG REV/REPORT: CPT | Performed by: INTERNAL MEDICINE

## 2018-06-04 DIAGNOSIS — I47.1 PAROXYSMAL SVT (SUPRAVENTRICULAR TACHYCARDIA) (HCC): Primary | ICD-10-CM

## 2018-06-06 ENCOUNTER — ANTI-COAG VISIT (OUTPATIENT)
Dept: FAMILY MEDICINE CLINIC | Age: 73
End: 2018-06-06

## 2018-06-06 LAB
INR BLD: 1.6
PROTHROMBIN TIME: 16.4 SEC (ref 9–11.5)

## 2018-06-13 ENCOUNTER — TELEPHONE (OUTPATIENT)
Dept: CARDIOLOGY CLINIC | Age: 73
End: 2018-06-13

## 2018-06-14 ENCOUNTER — TELEPHONE (OUTPATIENT)
Dept: FAMILY MEDICINE CLINIC | Age: 73
End: 2018-06-14

## 2018-06-19 ENCOUNTER — TELEPHONE (OUTPATIENT)
Dept: CARDIOLOGY CLINIC | Age: 73
End: 2018-06-19

## 2018-06-21 ENCOUNTER — ANTI-COAG VISIT (OUTPATIENT)
Dept: FAMILY MEDICINE CLINIC | Age: 73
End: 2018-06-21

## 2018-06-21 LAB
INR BLD: 1.8
PROTHROMBIN TIME: 18 SEC (ref 9–11.5)

## 2018-07-03 ENCOUNTER — ANTI-COAG VISIT (OUTPATIENT)
Dept: FAMILY MEDICINE CLINIC | Age: 73
End: 2018-07-03

## 2018-07-17 ENCOUNTER — OFFICE VISIT (OUTPATIENT)
Dept: CARDIOLOGY CLINIC | Age: 73
End: 2018-07-17

## 2018-07-17 VITALS
SYSTOLIC BLOOD PRESSURE: 158 MMHG | WEIGHT: 230.4 LBS | DIASTOLIC BLOOD PRESSURE: 81 MMHG | HEART RATE: 85 BPM | HEIGHT: 68 IN | BODY MASS INDEX: 34.92 KG/M2

## 2018-07-17 DIAGNOSIS — I47.1 PAROXYSMAL SVT (SUPRAVENTRICULAR TACHYCARDIA) (HCC): Primary | ICD-10-CM

## 2018-07-17 DIAGNOSIS — I47.29 NSVT (NONSUSTAINED VENTRICULAR TACHYCARDIA): ICD-10-CM

## 2018-07-17 DIAGNOSIS — I10 ESSENTIAL HYPERTENSION: ICD-10-CM

## 2018-07-17 DIAGNOSIS — I48.91 ATRIAL FIBRILLATION, UNSPECIFIED TYPE (HCC): ICD-10-CM

## 2018-07-17 DIAGNOSIS — R00.1 BRADYCARDIA: ICD-10-CM

## 2018-07-17 DIAGNOSIS — I48.0 PAROXYSMAL ATRIAL FIBRILLATION (HCC): ICD-10-CM

## 2018-07-17 PROCEDURE — 99214 OFFICE O/P EST MOD 30 MIN: CPT | Performed by: INTERNAL MEDICINE

## 2018-07-17 PROCEDURE — 93000 ELECTROCARDIOGRAM COMPLETE: CPT | Performed by: INTERNAL MEDICINE

## 2018-07-17 NOTE — PROGRESS NOTES
[address]   Electrophysiology Consultation   Date: 2018  Reason for Consultation: Dizziness  Consult Requesting Physician: Kevyn Huff MD    CC: Dizziness   HPI: Hollice Schlatter is a 71 y.o. female referred for further evaluation of dizziness and bradycardia. MCOT monitor showed PVCs and PACs with occasional episodes of VT. She has pmh of HTN DM, HLD, PSVT, afib. She is on coumadin for anticoagulation. She has not had stress test or echocardiogram. She arrives to office with her  today. She states she has episodes of \"weak\" heart rate and also episodes of fast heart rate. She reports history of DVT and PE. She states when she had episodes of afib she felt skipped beats but this episode was different and she just felt very weak. She does not have any history of stroke or heart attack. She does not snore at night when she sleeps but she does have daytime fatigue.      Past Medical History:   Diagnosis Date    Asthma     Atrial fibrillation (HCC)     Cancer (HCC)     basal and squamous    Eosinophilia     Hemoptysis     HIGH CHOLESTEROL     Hypertension     Irregular heart beat     Other specified gastritis without mention of hemorrhage     Palpitations     Type II or unspecified type diabetes mellitus without mention of complication, not stated as uncontrolled         Past Surgical History:   Procedure Laterality Date    BRONCHOSCOPY  2016     SECTION      CHOLECYSTECTOMY      COLONOSCOPY      SKIN BIOPSY         Allergies   Allergen Reactions    Lxrgbfjx-Zobyonx-Peokzu [Fluocinolone] Shortness Of Breath    Ciprofloxacin Shortness Of Breath    Diovan [Valsartan] Shortness Of Breath    Flagyl [Metronidazole] Shortness Of Breath     Has taken diflucan at home 12/7/15    Metformin And Related [Metformin And Related] Shortness Of Breath    Benazepril      Other reaction(s): Not Recorded    Saxagliptin      Other reaction(s): Not Recorded    Levaquin [Levofloxacin] Rash       Social History:   reports that she has never smoked. She has never used smokeless tobacco. She reports that she does not drink alcohol or use drugs. Family History:  family history includes Asthma in her mother; Cancer in her father; Hypertension in her mother. Review of System:  All other systems reviewed except for that noted above. Pertinent negatives and positives are:      General: negative for fever, chills   Ophthalmic ROS: negative for - eye pain or loss of vision  ENT ROS: negative for - headaches, sore throat   Respiratory: negative for - cough, sputum  Cardiovascular: Reviewed in HPI  Gastrointestinal: negative for - abdominal pain, diarrhea, N/V  Hematology: negative for - bleeding, blood clots, bruising or jaundice  Genito-Urinary:  negative for - Dysuria or incontinence  Musculoskeletal: negative for - Joint swelling, muscle pain  Neurological: negative for - confusion, dizziness, headaches   Psychiatric: No anxiety, no depression. Dermatological: negative for - rash    Physical Examination:  Vitals:    07/17/18 1542   BP: (!) 158/81   Pulse: Wt Readings from Last 3 Encounters:   07/17/18 230 lb 6.4 oz (104.5 kg)   04/27/18 231 lb 3.2 oz (104.9 kg)   01/11/18 215 lb (97.5 kg)       · Constitutional: Oriented. No distress. · Head: Normocephalic and atraumatic. · Mouth/Throat: Oropharynx is clear and moist.   · Eyes: Conjunctivae normal. EOM are normal.   · Neck: Neck supple. No rigidity. No JVD present. · Cardiovascular: Normal rate, regular rhythm, S1&S2.  +Murmur  · Pulmonary/Chest: Bilateral respiratory sounds. No wheezes, No rhonchi. · Abdominal: Soft. Bowel sounds present. No distension, No tenderness. · Musculoskeletal: No tenderness. No edema    · Lymphadenopathy: Has no cervical adenopathy. · Neurological: Alert and oriented. Cranial nerve appears intact, No Gross deficit   · Skin: Skin is warm and dry. No rash noted.    · Psychiatric: Has a normal behavior       Labs, diagnostic and imaging results reviewed. Reviewed. EC2018 Junctional rhythm 84bpm    Medication:  Current Outpatient Prescriptions   Medication Sig Dispense Refill    warfarin (COUMADIN) 5 MG tablet 5mg Mon, Wed, Fri and 7.5mg all other days 130 tablet 3    montelukast (SINGULAIR) 10 MG tablet TAKE 1 TABLET NIGHTLY 90 tablet 3    insulin glargine (LANTUS) 100 UNIT/ML injection pen 60 units AM and 40 units Bedtime 30 pen 3    Insulin Pen Needle (BD PEN NEEDLE JANNET U/F) 32G X 4 MM MISC 1 each by Does not apply route 4 times daily 400 each 3    diltiazem (TIAZAC) 360 MG extended release capsule Take 1 capsule by mouth daily 90 capsule 3    glimepiride (AMARYL) 4 MG tablet Take 1 tablet by mouth every morning (before breakfast) 90 tablet 3    hydrochlorothiazide (HYDRODIURIL) 25 MG tablet Take 1 tablet by mouth daily 90 tablet 3    spironolactone (ALDACTONE) 50 MG tablet Take 1 tablet by mouth daily 90 tablet 3    pravastatin (PRAVACHOL) 40 MG tablet Take 1 tablet by mouth nightly 90 tablet 3    digoxin (LANOXIN) 250 MCG tablet Take 1.5 tablets by mouth daily 145 tablet 3    predniSONE (DELTASONE) 10 MG tablet Take 1 tablet by mouth as needed (eosinophilic gastritis) 546 tablet 3    polyethylene glycol (GLYCOLAX) powder FILL DOSING CUP TO MARKED LINE (17 GRAMS) THEN MIX IN LIQUID AND DRINK DAILY 1581 g 3    exenatide (BYETTA 10 MCG PEN) 10 MCG/0.04ML injection Inject 0.04 mLs into the skin 2 times daily (with meals) 3 pen 3    nystatin (MYCOSTATIN) 873157 UNIT/ML suspension 1 teaspoon 4 times daily x 5 d 120 mL 5    albuterol sulfate HFA (PROAIR HFA) 108 (90 BASE) MCG/ACT inhaler Inhale 2 puffs into the lungs every 6 hours as needed for Wheezing 3 Inhaler 3     No current facility-administered medications for this visit.         Assessment and plan:     Atrial fibrillation   Afib risk factors including age, HTN, obesity, inactivity and IRIS were discussed with

## 2018-07-17 NOTE — LETTER
[address]  EP Procedure Sheet    [unfilled]    Durene Guard  1945    EP Procedures     Pacemaker implant  EP Study    ICD implant  Atrial flutter ablation     Biv implant  Atrial fibrillation ablation    Generator Change  SVT ablation    Lead revision  VT ablation    Lead extraction +/- upgrade  AVN ablation   x Loop implant  Cardioversion     Other:   JUDE     Equipment    x Medtronic   MONALISA Mapping System    [place]  [address] [address]  Cryo[employer]blation    Biotronik  Laser Lead Extraction    Special Equipment       EP Procedures Scheduling Request    Time Requested     Specific Day To be done after stress test    Anesthesia    CT surgery backup    Location [facility]     Pre-Procedure Labs / Imaging     PT/INR  Type & cross    CBC  Units PRBC    BMP/Mg  Units FFP    Venogram  CXR    Echo  Cardiac CTA for Pulmonary vein mapping     Patient Instructions  Dx: atrial fib  JAYRO

## 2018-07-27 ENCOUNTER — TELEPHONE (OUTPATIENT)
Dept: FAMILY MEDICINE CLINIC | Age: 73
End: 2018-07-27

## 2018-07-28 PROBLEM — K66.8 PNEUMOPERITONEUM: Status: ACTIVE | Noted: 2018-07-28

## 2018-08-01 ENCOUNTER — CARE COORDINATION (OUTPATIENT)
Dept: CASE MANAGEMENT | Age: 73
End: 2018-08-01

## 2018-08-03 ENCOUNTER — NURSE ONLY (OUTPATIENT)
Dept: FAMILY MEDICINE CLINIC | Age: 73
End: 2018-08-03

## 2018-08-03 VITALS — SYSTOLIC BLOOD PRESSURE: 120 MMHG | DIASTOLIC BLOOD PRESSURE: 70 MMHG | HEART RATE: 94 BPM | OXYGEN SATURATION: 96 %

## 2018-08-03 DIAGNOSIS — I48.91 ATRIAL FIBRILLATION, UNSPECIFIED TYPE (HCC): Primary | ICD-10-CM

## 2018-08-03 LAB
INTERNATIONAL NORMALIZATION RATIO, POC: 1.4
PROTHROMBIN TIME, POC: NORMAL

## 2018-08-03 PROCEDURE — 85610 PROTHROMBIN TIME: CPT | Performed by: FAMILY MEDICINE

## 2018-08-06 ENCOUNTER — NURSE ONLY (OUTPATIENT)
Dept: FAMILY MEDICINE CLINIC | Age: 73
End: 2018-08-06

## 2018-08-06 ENCOUNTER — ANTI-COAG VISIT (OUTPATIENT)
Dept: FAMILY MEDICINE CLINIC | Age: 73
End: 2018-08-06

## 2018-08-06 VITALS — OXYGEN SATURATION: 95 % | HEART RATE: 100 BPM | DIASTOLIC BLOOD PRESSURE: 80 MMHG | SYSTOLIC BLOOD PRESSURE: 130 MMHG

## 2018-08-06 DIAGNOSIS — I48.91 ATRIAL FIBRILLATION, UNSPECIFIED TYPE (HCC): Primary | ICD-10-CM

## 2018-08-06 LAB
INTERNATIONAL NORMALIZATION RATIO, POC: 2.9
PROTHROMBIN TIME, POC: NORMAL

## 2018-08-06 PROCEDURE — 85610 PROTHROMBIN TIME: CPT | Performed by: FAMILY MEDICINE

## 2018-08-07 ENCOUNTER — HOSPITAL ENCOUNTER (OUTPATIENT)
Dept: NON INVASIVE DIAGNOSTICS | Age: 73
Discharge: HOME | End: 2018-08-07
Attending: INTERNAL MEDICINE | Admitting: INTERNAL MEDICINE

## 2018-08-07 DIAGNOSIS — I48.0 PAROXYSMAL ATRIAL FIBRILLATION (HCC): ICD-10-CM

## 2018-08-07 DIAGNOSIS — I48.91 ATRIAL FIBRILLATION, UNSPECIFIED TYPE (HCC): ICD-10-CM

## 2018-08-07 LAB
LEFT VENTRICULAR EJECTION FRACTION HIGH VALUE: 70 %
LEFT VENTRICULAR EJECTION FRACTION MODE: NORMAL
LV EF: 67 %
LV EF: 70 %
LVEF MODALITY: NORMAL
LVEF MODALITY: NORMAL

## 2018-08-07 NOTE — PROGRESS NOTES
Instructed on Lexiscan Stress Test Procedure including possible side effects/ adverse reactions. Patient verbalizes  understanding and denies having any questions . See [address] [place] [address] Rd Cardiology.   Latonya Poon RN

## 2018-08-08 ENCOUNTER — CARE COORDINATION (OUTPATIENT)
Dept: CASE MANAGEMENT | Age: 73
End: 2018-08-08

## 2018-08-16 PROBLEM — I20.0 UNSTABLE ANGINA (HCC): Status: ACTIVE | Noted: 2018-08-16

## 2018-08-16 PROBLEM — Z45.09 ENCOUNTER FOR ELECTRONIC ANALYSIS OF REVEAL EVENT RECORDER: Status: ACTIVE | Noted: 2018-08-16

## 2018-08-22 PROBLEM — Z86.79 S/P MAZE OPERATION FOR ATRIAL FIBRILLATION: Status: ACTIVE | Noted: 2018-08-22

## 2018-08-22 PROBLEM — Z95.3 S/P MITRAL VALVE REPLACEMENT WITH BIOPROSTHETIC VALVE: Status: ACTIVE | Noted: 2018-08-22

## 2018-08-22 PROBLEM — Z98.890 S/P MAZE OPERATION FOR ATRIAL FIBRILLATION: Status: ACTIVE | Noted: 2018-08-22

## 2018-08-22 PROBLEM — Z95.1 S/P CORONARY ARTERY BYPASS GRAFT X 3: Status: ACTIVE | Noted: 2018-08-22

## 2018-08-27 ENCOUNTER — TELEPHONE (OUTPATIENT)
Dept: CARDIOLOGY CLINIC | Age: 73
End: 2018-08-27

## 2018-08-27 NOTE — TELEPHONE ENCOUNTER
----- Message from Steve Mancia MD sent at 8/8/2018  1:20 PM EDT -----    Patient has abnormal stress testing. Please refer to interventional cardiology for further evaluation.      Stvee Mancia MD, MPH  [place] [address]   Office: [phone]

## 2018-09-05 ENCOUNTER — CARE COORDINATION (OUTPATIENT)
Dept: CASE MANAGEMENT | Age: 73
End: 2018-09-05

## 2018-09-05 DIAGNOSIS — I20.0 UNSTABLE ANGINA (HCC): Primary | ICD-10-CM

## 2018-09-06 ENCOUNTER — TELEPHONE (OUTPATIENT)
Dept: FAMILY MEDICINE CLINIC | Age: 73
End: 2018-09-06

## 2018-09-06 ENCOUNTER — ANTI-COAG VISIT (OUTPATIENT)
Dept: FAMILY MEDICINE CLINIC | Age: 73
End: 2018-09-06

## 2018-09-06 LAB — INR BLD: 4.1

## 2018-09-06 NOTE — TELEPHONE ENCOUNTER
[facility] is calling with PT 49.7 INR 4.1    Pt takes coumadin 5 mg Mon , wed, Fri and other days 7.5 mg    Please call Viktoriya Osborn to advise  [phone]

## 2018-09-07 PROBLEM — M17.0 PRIMARY OSTEOARTHRITIS OF BOTH KNEES: Status: ACTIVE | Noted: 2017-03-21

## 2018-09-07 PROBLEM — E04.9 GOITER: Status: ACTIVE | Noted: 2018-09-07

## 2018-09-07 PROBLEM — C44.02 SQUAMOUS CELL CARCINOMA OF LIP: Status: ACTIVE | Noted: 2018-09-07

## 2018-09-07 PROBLEM — K11.21 ACUTE PAROTITIS: Status: RESOLVED | Noted: 2018-01-11 | Resolved: 2018-09-07

## 2018-09-07 PROBLEM — I78.0 OSLER HEMORRHAGIC TELANGIECTASIA SYNDROME (HCC): Status: ACTIVE | Noted: 2018-09-07

## 2018-09-08 ENCOUNTER — ANTI-COAG VISIT (OUTPATIENT)
Dept: FAMILY MEDICINE CLINIC | Age: 73
End: 2018-09-08

## 2018-09-08 ENCOUNTER — TELEPHONE (OUTPATIENT)
Dept: FAMILY MEDICINE CLINIC | Age: 73
End: 2018-09-08

## 2018-09-08 LAB — INR BLD: 2.6

## 2018-09-08 NOTE — TELEPHONE ENCOUNTER
Maryanne Carpenter is calling from [facility] at [phone]     Results on Pt/inr  For Saturday 9/8/18 @ 9:33   Pt 31.6   Int 2.6    Was told to do the PT/INR Saturday morning       They held her coumadin on Thursday 9/6/18 and gave her 5mg Friday 9/7/18 night      Please advise

## 2018-09-10 ENCOUNTER — CARE COORDINATION (OUTPATIENT)
Dept: CASE MANAGEMENT | Age: 73
End: 2018-09-10

## 2018-09-10 NOTE — CARE COORDINATION
[address] Transitions Follow Up Call    9/10/2018    Patient: Griselda Dolin  Patient : 1945   MRN: [id number]  Reason for Admission:  CABG x 3; mitral valve replacement; MAZE  Discharge Date: 18 RARS: Readmission Risk Score: 35       Spoke with: pt's     Care Transitions Subsequent and Final Call    Subsequent and Final Calls  Do you have any ongoing symptoms?:  No  Have your medications changed?:  No  Do you have any questions related to your medications?:  No  Do you currently have any active services?:  Yes  Are you currently active with any services?:  Home Health  Do you have any needs or concerns that I can assist you with?:  No  Identified Barriers:  None  Care Transitions Interventions  No Identified Needs  Other Interventions:          Pt's  states pt is doing well, no issues or concerns. Incisions continue to look good, minimal pain. Ty nurse will be out tomorrow. F/U with Dr. Sarah Bergeron on .  Agreed to more CTC f/u calls      Follow Up  Future Appointments  Date Time Provider Tawana Young   2018 12:45 PM Brsisa Castro MD [facility]TS [facility]   2018 11:30 AM SCHEDULE, SHERYL PACERS  [facility]   2018 11:30 AM Mary Moeller MD  [facility]   10/23/2018 8:00 AM SCHEDULE, SHERYL PHONE TRANSMISSION  [facility]   2018 8:00 AM Mary Moeller MD  [facility]       Jj Vaca, NAZANIN

## 2018-09-11 ENCOUNTER — TELEPHONE (OUTPATIENT)
Dept: FAMILY MEDICINE CLINIC | Age: 73
End: 2018-09-11

## 2018-09-11 LAB — INR BLD: 4.8

## 2018-09-11 NOTE — TELEPHONE ENCOUNTER
Kaelyn Smith  from [facility] [phone] that per Dr. Grzegorz Chavez, patient is to HOLD coumadin this evening, and when Dr. Francesca Toth comes in the morning will speak with him and find out which dose for patient to take.

## 2018-09-12 ENCOUNTER — ANTI-COAG VISIT (OUTPATIENT)
Dept: FAMILY MEDICINE CLINIC | Age: 73
End: 2018-09-12

## 2018-09-13 ENCOUNTER — ANTI-COAG VISIT (OUTPATIENT)
Dept: FAMILY MEDICINE CLINIC | Age: 73
End: 2018-09-13

## 2018-09-13 ENCOUNTER — TELEPHONE (OUTPATIENT)
Dept: FAMILY MEDICINE CLINIC | Age: 73
End: 2018-09-13

## 2018-09-13 ENCOUNTER — OFFICE VISIT (OUTPATIENT)
Dept: CARDIOTHORACIC SURGERY | Age: 73
End: 2018-09-13

## 2018-09-13 VITALS
SYSTOLIC BLOOD PRESSURE: 126 MMHG | HEART RATE: 83 BPM | HEIGHT: 68 IN | OXYGEN SATURATION: 97 % | TEMPERATURE: 98.1 F | DIASTOLIC BLOOD PRESSURE: 70 MMHG | BODY MASS INDEX: 32.89 KG/M2 | WEIGHT: 217 LBS

## 2018-09-13 DIAGNOSIS — Z95.1 S/P CABG (CORONARY ARTERY BYPASS GRAFT): Primary | ICD-10-CM

## 2018-09-13 DIAGNOSIS — Z95.2 S/P MITRAL VALVE REPLACEMENT: ICD-10-CM

## 2018-09-13 DIAGNOSIS — Z86.79 S/P MAZE OPERATION FOR ATRIAL FIBRILLATION: ICD-10-CM

## 2018-09-13 DIAGNOSIS — Z98.890 S/P MAZE OPERATION FOR ATRIAL FIBRILLATION: ICD-10-CM

## 2018-09-13 LAB — INR BLD: 3.1

## 2018-09-13 PROCEDURE — 99024 POSTOP FOLLOW-UP VISIT: CPT | Performed by: THORACIC SURGERY (CARDIOTHORACIC VASCULAR SURGERY)

## 2018-09-13 NOTE — TELEPHONE ENCOUNTER
Resume Coumadin at 5 mg daily and recheck INR Monday  Patient is not scheduled with me for a hospital follow-up and this needs to be done

## 2018-09-13 NOTE — PATIENT INSTRUCTIONS
PREVENTION OF RECURRENT ATHEROSCLEROSIS:  A MESSAGE TO PATIENTS AND THEIR LOVED ONES FROM YOUR SURGEON    You have recently had successful surgery for atherosclerosis. Now your real work begins. Atherosclerosis (hardening of the arteries by cholesterol and fat deposits) can be slowed or stopped from further blocking your own arteries or the new bypass grafts by following \"risk factor management\". If you follow these principles carefully, you can reduce your chances of having heart attacks, strokes, and the need for future surgery. Your cardiologist and primary care doctor should follow these concepts, but your surgeons believe that this is so important that we want you to begin thinking about and following these concepts now. These are the important risk factors you and your doctors should follow carefully. The marked ones apply to YOU:    [] SMOKING: Absolutely positively never smoke again. Keep your home and work place smoke­ free. Your doctors can prescribe patches, gum or other medications to help. [x] BLOOD PRESSURE CONTROL: Your blood pressure should be less than 140/90. If you have diabetes or kidney disease, your blood pressure should be less than 130/80. Weight reduction, exercise and medications can help you control high blood pressure. [x] CHOLESTEROL AND FATS: A diet low in saturated fat (< 7%) and low in cholesterol (< 200 mg/day), and weight reduction, and exercise, and medications as necessary can help you achieve these goals:  Low density (bad) cholesterol: <70 mg/dL (the lower, the better)   Triglycerides: <150 mg/dL (the lower, the better)   High density (good) cholesterol: >40 mg/dL (the higher, the better)  Make an appointment to see your primary care physician, Dr. Daniela Noriega 2 months after your surgery to check your cholesterol profile.   [x] EXERCISE: Your cardiac rehabilitation program will help you work up to a regular make you sweat\" program of at least 30 minutes, at least 6 times per

## 2018-09-14 ENCOUNTER — TELEPHONE (OUTPATIENT)
Dept: CARDIOTHORACIC SURGERY | Age: 73
End: 2018-09-14

## 2018-09-14 ENCOUNTER — CARE COORDINATION (OUTPATIENT)
Dept: CASE MANAGEMENT | Age: 73
End: 2018-09-14

## 2018-09-14 DIAGNOSIS — Z86.79 S/P MAZE OPERATION FOR ATRIAL FIBRILLATION: ICD-10-CM

## 2018-09-14 DIAGNOSIS — I48.19 PERSISTENT ATRIAL FIBRILLATION (HCC): Primary | ICD-10-CM

## 2018-09-14 DIAGNOSIS — Z98.890 S/P MAZE OPERATION FOR ATRIAL FIBRILLATION: ICD-10-CM

## 2018-09-14 RX ORDER — DIGOXIN 0.06 MG/1
0.06 TABLET ORAL DAILY
Qty: 30 TABLET | Refills: 3 | Status: SHIPPED | OUTPATIENT
Start: 2018-09-14 | End: 2018-10-26

## 2018-09-17 ENCOUNTER — TELEPHONE (OUTPATIENT)
Dept: FAMILY MEDICINE CLINIC | Age: 73
End: 2018-09-17

## 2018-09-17 LAB — INR BLD: 2

## 2018-09-18 ENCOUNTER — ANTI-COAG VISIT (OUTPATIENT)
Dept: FAMILY MEDICINE CLINIC | Age: 73
End: 2018-09-18

## 2018-09-18 ENCOUNTER — TELEPHONE (OUTPATIENT)
Dept: FAMILY MEDICINE CLINIC | Age: 73
End: 2018-09-18

## 2018-09-18 ENCOUNTER — OFFICE VISIT (OUTPATIENT)
Dept: CARDIOLOGY CLINIC | Age: 73
End: 2018-09-18

## 2018-09-18 ENCOUNTER — PROCEDURE VISIT (OUTPATIENT)
Dept: CARDIOLOGY CLINIC | Age: 73
End: 2018-09-18

## 2018-09-18 VITALS
WEIGHT: 217 LBS | HEIGHT: 68 IN | BODY MASS INDEX: 32.89 KG/M2 | SYSTOLIC BLOOD PRESSURE: 120 MMHG | DIASTOLIC BLOOD PRESSURE: 72 MMHG | HEART RATE: 83 BPM

## 2018-09-18 DIAGNOSIS — Z95.2 H/O MITRAL VALVE REPLACEMENT: Primary | ICD-10-CM

## 2018-09-18 DIAGNOSIS — I48.0 PAF (PAROXYSMAL ATRIAL FIBRILLATION) (HCC): ICD-10-CM

## 2018-09-18 DIAGNOSIS — Z95.3 S/P MITRAL VALVE REPLACEMENT WITH BIOPROSTHETIC VALVE: ICD-10-CM

## 2018-09-18 DIAGNOSIS — Z45.09 ENCOUNTER FOR ELECTRONIC ANALYSIS OF REVEAL EVENT RECORDER: Primary | ICD-10-CM

## 2018-09-18 DIAGNOSIS — I25.118 CORONARY ARTERY DISEASE OF NATIVE ARTERY OF NATIVE HEART WITH STABLE ANGINA PECTORIS (HCC): ICD-10-CM

## 2018-09-18 DIAGNOSIS — I48.19 PERSISTENT ATRIAL FIBRILLATION (HCC): ICD-10-CM

## 2018-09-18 PROCEDURE — 99214 OFFICE O/P EST MOD 30 MIN: CPT | Performed by: INTERNAL MEDICINE

## 2018-09-18 PROCEDURE — 93291 INTERROG DEV EVAL SCRMS IP: CPT | Performed by: INTERNAL MEDICINE

## 2018-09-18 RX ORDER — OXYCODONE HYDROCHLORIDE 5 MG/1
5 CAPSULE ORAL EVERY 4 HOURS PRN
Status: ON HOLD | COMMUNITY
End: 2018-10-11 | Stop reason: HOSPADM

## 2018-09-18 NOTE — TELEPHONE ENCOUNTER
oxyCODONE 5 MG capsule        Sig - Route: Take 5 mg by mouth every 4 hours as needed for Pain. . - Oral      [facility] on [address] in chart     Pt states that the above med does not upset her stomach like other pain  Meds do.

## 2018-09-18 NOTE — PROGRESS NOTES
jaundice  Genito-Urinary:  negative for - Dysuria or incontinence  Musculoskeletal: negative for - Joint swelling, muscle pain  Neurological: negative for - confusion, dizziness, headaches   Psychiatric: No anxiety, no depression. Dermatological: negative for - rash    Physical Examination:  Vitals:    18 1142   BP: 120/72   Pulse: 83      Wt Readings from Last 3 Encounters:   18 217 lb (98.4 kg)   18 217 lb (98.4 kg)   18 229 lb 8 oz (104.1 kg)       · Constitutional: Oriented. No distress. · Head: Normocephalic and atraumatic. · Mouth/Throat: Oropharynx is clear and moist.   · Eyes: Conjunctivae normal. EOM are normal.   · Neck: Neck supple. No rigidity. No JVD present. · Cardiovascular: Normal rate, regular rhythm, S1&S2.  +Murmur Incision healing   · Pulmonary/Chest: Bilateral respiratory sounds. No wheezes, No rhonchi. · Abdominal: Soft. Bowel sounds present. No distension, No tenderness. · Musculoskeletal: No tenderness. No edema    · Lymphadenopathy: Has no cervical adenopathy. · Neurological: Alert and oriented. Cranial nerve appears intact, No Gross deficit   · Skin: Skin is warm and dry. No rash noted. · Psychiatric: Has a normal behavior       Labs, diagnostic and imaging results reviewed. Reviewed. EC2018 Sinus rhythm   Echo: 18  Summary   -Normal global systolic function with an ejection fraction estimated at 70%.  -No regional wall motion abnormalities noted.   -Moderate concentric left ventricular hypertrophy.   -Left atrial enlargement based on volume index.   -Mild aortic stenosis with a peak velocity of 2.48 m/s and a mean pressure   gradient of 14 mmHg. The aortic valve area is estimated at 1.12 cm^2. No   significant regurgitation noted.   -There is moderate mitral stenosis with a mean pressure gradient of 9 mmHg   and a valve area by pressure halftime estimated at 1.46 cm^2.    -There is mild mitral regurgitation.   -Severe mitral check blood sugar 4 times a day and PRN, she is treated with multiple daily injections of insulin that require correction dosing ;A1C 8.1 ; ICD code-E11.65 ,Z79.4 100 each 5    FREESTYLE LANCETS MISC 1 each by Does not apply route 4 times daily Patient to check blood sugar 4 times a day and PRN, she is treated with multiple daily injections of insulin that require correction dosing ;A1C 8.1 ; ICD code-E11.65 ,Z79.4 100 each 5    blood glucose test strips (ASCENSIA AUTODISC VI;ONE TOUCH ULTRA TEST VI) strip 1 each by Does not apply route 4 times daily (before meals and nightly) Patient to check blood sugar 4 times a day and PRN, she is treated with multiple daily injections of insulin that require correction dosing. LABA1C  8.1 08/16/2018 ICD code- E11.65, Z79.4 100 each 3    warfarin (COUMADIN) 5 MG tablet 5mg Mon, Wed, Fri and 7.5mg all other days (Patient taking differently: 7.5mg Mon, tues Wed,Thurs Sat and Sun.  10mg on Friday.) 130 tablet 3    montelukast (SINGULAIR) 10 MG tablet TAKE 1 TABLET NIGHTLY 90 tablet 3    glimepiride (AMARYL) 4 MG tablet Take 1 tablet by mouth every morning (before breakfast) 90 tablet 3    pravastatin (PRAVACHOL) 40 MG tablet Take 1 tablet by mouth nightly 90 tablet 3    predniSONE (DELTASONE) 10 MG tablet Take 1 tablet by mouth as needed (eosinophilic gastritis) (Patient taking differently: Take 5 mg by mouth as needed (eosinophilic gastritis) ) 855 tablet 3    polyethylene glycol (GLYCOLAX) powder FILL DOSING CUP TO MARKED LINE (17 GRAMS) THEN MIX IN LIQUID AND DRINK DAILY 1581 g 3    exenatide (BYETTA 10 MCG PEN) 10 MCG/0.04ML injection Inject 0.04 mLs into the skin 2 times daily (with meals) 3 pen 3    nystatin (MYCOSTATIN) 179579 UNIT/ML suspension 1 teaspoon 4 times daily x 5 d 120 mL 5    albuterol sulfate HFA (PROAIR HFA) 108 (90 BASE) MCG/ACT inhaler Inhale 2 puffs into the lungs every 6 hours as needed for Wheezing 3 Inhaler 3    insulin glargine (LANTUS) 100 treatment, procedures, and medical decision making performed by me. NOTE: This report was transcribed using voice recognition software. Every effort was made to ensure accuracy, however, inadvertent computerized transcription errors may be present.      Alek Belcher MD, MPH  [facility], [place], operated by [facility]   Office: [phone]

## 2018-09-18 NOTE — PATIENT INSTRUCTIONS
Patient Education        Atrial Fibrillation: Care Instructions  Your Care Instructions    Atrial fibrillation is an irregular and often fast heartbeat. Treating this condition is important for several reasons. It can cause blood clots, which can travel from your heart to your brain and cause a stroke. If you have a fast heartbeat, you may feel lightheaded, dizzy, and weak. An irregular heartbeat can also increase your risk for heart failure. Atrial fibrillation is often the result of another heart condition, such as high blood pressure or coronary artery disease. Making changes to improve your heart condition will help you stay healthy and active. Follow-up care is a key part of your treatment and safety. Be sure to make and go to all appointments, and call your doctor if you are having problems. It's also a good idea to know your test results and keep a list of the medicines you take. How can you care for yourself at home? Medicines    · Take your medicines exactly as prescribed. Call your doctor if you think you are having a problem with your medicine. You will get more details on the specific medicines your doctor prescribes.     · If your doctor has given you a blood thinner to prevent a stroke, be sure you get instructions about how to take your medicine safely. Blood thinners can cause serious bleeding problems.     · Do not take any vitamins, over-the-counter drugs, or herbal products without talking to your doctor first.    Lifestyle changes    · Do not smoke. Smoking can increase your chance of a stroke and heart attack. If you need help quitting, talk to your doctor about stop-smoking programs and medicines. These can increase your chances of quitting for good.     · Eat a heart-healthy diet.     · Stay at a healthy weight. Lose weight if you need to.     · Limit alcohol to 2 drinks a day for men and 1 drink a day for women. Too much alcohol can cause health problems.     · Avoid colds and flu.  Get a · You have symptoms of a stroke. These may include:  ¨ Sudden numbness, tingling, weakness, or loss of movement in your face, arm, or leg, especially on only one side of your body. ¨ Sudden vision changes. ¨ Sudden trouble speaking. ¨ Sudden confusion or trouble understanding simple statements. ¨ Sudden problems with walking or balance. ¨ A sudden, severe headache that is different from past headaches.     · You passed out (lost consciousness).    Call your doctor now or seek immediate medical care if:    · You have new or increased shortness of breath.     · You feel dizzy or lightheaded, or you feel like you may faint.     · Your heart rate becomes irregular.     · You can feel your heart flutter in your chest or skip heartbeats. Tell your doctor if these symptoms are new or worse.    Watch closely for changes in your health, and be sure to contact your doctor if you have any problems. Where can you learn more? Go to [email or web address] and sign in to your [email or web address] account. Enter U020 in the v2tel box to learn more about \"Atrial Fibrillation: Care Instructions. \"     If you do not have an account, please click on the \"Sign Up Now\" link. Current as of: December 6, 2017  Content Version: 11.7  © 0484-8209 [employer]. Care instructions adapted under license by [facility] ([facility]). If you have questions about a medical condition or this instruction, always ask your healthcare professional. [place] [address] any warranty or liability for your use of this information.

## 2018-09-19 ENCOUNTER — OFFICE VISIT (OUTPATIENT)
Dept: FAMILY MEDICINE CLINIC | Age: 73
End: 2018-09-19

## 2018-09-19 ENCOUNTER — CARE COORDINATION (OUTPATIENT)
Dept: CASE MANAGEMENT | Age: 73
End: 2018-09-19

## 2018-09-19 ENCOUNTER — TELEPHONE (OUTPATIENT)
Dept: FAMILY MEDICINE CLINIC | Age: 73
End: 2018-09-19

## 2018-09-19 VITALS
BODY MASS INDEX: 33.12 KG/M2 | OXYGEN SATURATION: 96 % | HEART RATE: 80 BPM | DIASTOLIC BLOOD PRESSURE: 62 MMHG | WEIGHT: 217.8 LBS | TEMPERATURE: 97.4 F | SYSTOLIC BLOOD PRESSURE: 100 MMHG

## 2018-09-19 DIAGNOSIS — Z95.3 S/P MITRAL VALVE REPLACEMENT WITH BIOPROSTHETIC VALVE: ICD-10-CM

## 2018-09-19 DIAGNOSIS — B35.6 TINEA CRURIS: Primary | ICD-10-CM

## 2018-09-19 DIAGNOSIS — Z79.4 TYPE 2 DIABETES MELLITUS WITH COMPLICATION, WITH LONG-TERM CURRENT USE OF INSULIN (HCC): ICD-10-CM

## 2018-09-19 DIAGNOSIS — E11.8 TYPE 2 DIABETES MELLITUS WITH COMPLICATION, WITH LONG-TERM CURRENT USE OF INSULIN (HCC): ICD-10-CM

## 2018-09-19 PROCEDURE — 99213 OFFICE O/P EST LOW 20 MIN: CPT | Performed by: FAMILY MEDICINE

## 2018-09-19 RX ORDER — OXYCODONE HYDROCHLORIDE 5 MG/1
5 TABLET ORAL EVERY 4 HOURS PRN
Refills: 0 | Status: CANCELLED | OUTPATIENT
Start: 2018-09-19

## 2018-09-19 RX ORDER — OXYCODONE HYDROCHLORIDE 5 MG/1
5 TABLET ORAL EVERY 4 HOURS PRN
Qty: 28 TABLET | Refills: 0 | Status: SHIPPED | OUTPATIENT
Start: 2018-09-19 | End: 2018-09-26

## 2018-09-19 RX ORDER — FLUCONAZOLE 100 MG/1
100 TABLET ORAL DAILY
Qty: 7 TABLET | Refills: 0 | Status: SHIPPED | OUTPATIENT
Start: 2018-09-19 | End: 2021-04-13 | Stop reason: SDUPTHER

## 2018-09-19 ASSESSMENT — PATIENT HEALTH QUESTIONNAIRE - PHQ9
1. LITTLE INTEREST OR PLEASURE IN DOING THINGS: 0
2. FEELING DOWN, DEPRESSED OR HOPELESS: 0
SUM OF ALL RESPONSES TO PHQ9 QUESTIONS 1 & 2: 0
SUM OF ALL RESPONSES TO PHQ QUESTIONS 1-9: 0
SUM OF ALL RESPONSES TO PHQ QUESTIONS 1-9: 0

## 2018-09-19 NOTE — PROGRESS NOTES
(incisional) for up to 7 days. .    Other orders  -     fluconazole (DIFLUCAN) 100 MG tablet; Take 1 tablet by mouth daily for 7 days Take 5 mg coumadin when taking diflucan    Discontinue glimepiride and use Byetta daily and twice daily if blood sugars are over 160        Plan:      Continue using Monistat cream to the affected rash  Keep appointment for next week to review hospital information  Just a diabetic medications as noted above    Please note that this chart was generated using Dragon dictation software. Although every effort was made to ensure the accuracy of this automated transcription, some errors in transcription may have occurred.

## 2018-09-21 ENCOUNTER — CARE COORDINATION (OUTPATIENT)
Dept: CARE COORDINATION | Age: 73
End: 2018-09-21

## 2018-09-21 NOTE — CARE COORDINATION
(DELTASONE) 10 MG tablet Take 1 tablet by mouth as needed (eosinophilic gastritis)  Patient taking differently: Take 5 mg by mouth as needed (eosinophilic gastritis)  62/20/31   Tamar Diaz MD   polyethylene glycol (GLYCOLAX) powder FILL DOSING CUP TO MARKED LINE (17 GRAMS) THEN MIX IN LIQUID AND DRINK DAILY 12/28/17   Tamar Diaz MD   exenatide (BYETTA 10 MCG PEN) 10 MCG/0.04ML injection Inject 0.04 mLs into the skin 2 times daily (with meals) 12/28/17   Tamar Diaz MD   nystatin (MYCOSTATIN) 743614 UNIT/ML suspension 1 teaspoon 4 times daily x 5 d 4/27/17   Tmaar Diaz MD   albuterol sulfate HFA (PROAIR HFA) 108 (90 BASE) MCG/ACT inhaler Inhale 2 puffs into the lungs every 6 hours as needed for Wheezing 1/3/17   Tamar Diaz MD       Future Appointments  Date Time Provider Tawana Young   9/25/2018 11:00 AM Tamar Diaz MD [facility]   10/11/2018 9:30 AM Ivan Crawley MD CVTS [facility]   10/19/2018 9:45 AM Hung Bennett MD FF Cardio [facility]   10/23/2018 8:00 AM SCHEDULE, [place] PHONE TRANSMISSION  Cardio [facility]   11/13/2018 8:00 AM Mónica Quintanilla MD  Cardio [facility]     ,   Diabetes Assessment            and   General Assessment       Pt agreeable to [address] [facility], including calls and office visits with [facility]. POC: will focus on diabetes education and empowering pt to make lifestyle changes that will positively impact her health and well-being. f/u with pt via PHONE x2 weeks.

## 2018-09-24 ENCOUNTER — TELEPHONE (OUTPATIENT)
Dept: FAMILY MEDICINE CLINIC | Age: 73
End: 2018-09-24

## 2018-09-24 ENCOUNTER — ANTI-COAG VISIT (OUTPATIENT)
Dept: FAMILY MEDICINE CLINIC | Age: 73
End: 2018-09-24

## 2018-09-24 LAB — INR BLD: 2.9

## 2018-09-24 NOTE — TELEPHONE ENCOUNTER
Verify coumadin dose. Seems different from flow sheet. Same dose if taking 5mg daily. Recheck 2 weeks.

## 2018-09-25 ENCOUNTER — OFFICE VISIT (OUTPATIENT)
Dept: FAMILY MEDICINE CLINIC | Age: 73
End: 2018-09-25
Payer: MEDICARE

## 2018-09-25 VITALS
WEIGHT: 212 LBS | HEART RATE: 82 BPM | RESPIRATION RATE: 12 BRPM | BODY MASS INDEX: 32.23 KG/M2 | SYSTOLIC BLOOD PRESSURE: 114 MMHG | DIASTOLIC BLOOD PRESSURE: 72 MMHG | OXYGEN SATURATION: 95 %

## 2018-09-25 DIAGNOSIS — Z79.4 TYPE 2 DIABETES MELLITUS WITH COMPLICATION, WITH LONG-TERM CURRENT USE OF INSULIN (HCC): Primary | ICD-10-CM

## 2018-09-25 DIAGNOSIS — I25.118 CORONARY ARTERY DISEASE OF NATIVE ARTERY OF NATIVE HEART WITH STABLE ANGINA PECTORIS (HCC): ICD-10-CM

## 2018-09-25 DIAGNOSIS — I10 ESSENTIAL HYPERTENSION: ICD-10-CM

## 2018-09-25 DIAGNOSIS — Z86.79 S/P MAZE OPERATION FOR ATRIAL FIBRILLATION: ICD-10-CM

## 2018-09-25 DIAGNOSIS — Z95.1 S/P CORONARY ARTERY BYPASS GRAFT X 3: ICD-10-CM

## 2018-09-25 DIAGNOSIS — I48.19 PERSISTENT ATRIAL FIBRILLATION (HCC): ICD-10-CM

## 2018-09-25 DIAGNOSIS — E11.8 TYPE 2 DIABETES MELLITUS WITH COMPLICATION, WITH LONG-TERM CURRENT USE OF INSULIN (HCC): Primary | ICD-10-CM

## 2018-09-25 DIAGNOSIS — Z98.890 S/P MAZE OPERATION FOR ATRIAL FIBRILLATION: ICD-10-CM

## 2018-09-25 DIAGNOSIS — K52.81 EOSINOPHILIC GASTRITIS: ICD-10-CM

## 2018-09-25 DIAGNOSIS — Z95.3 S/P MITRAL VALVE REPLACEMENT WITH BIOPROSTHETIC VALVE: ICD-10-CM

## 2018-09-25 PROCEDURE — 99214 OFFICE O/P EST MOD 30 MIN: CPT | Performed by: FAMILY MEDICINE

## 2018-09-25 RX ORDER — GLIMEPIRIDE 4 MG/1
2 TABLET ORAL
Qty: 90 TABLET | Refills: 3
Start: 2018-09-25 | End: 2018-12-28

## 2018-09-25 RX ORDER — PSEUDOEPHEDRINE HCL 30 MG
100 TABLET ORAL 2 TIMES DAILY
Qty: 180 CAPSULE | Refills: 0 | Status: SHIPPED | OUTPATIENT
Start: 2018-09-25 | End: 2018-10-25

## 2018-09-25 RX ORDER — CARVEDILOL 3.12 MG/1
3.12 TABLET ORAL 2 TIMES DAILY WITH MEALS
Qty: 180 TABLET | Refills: 1 | Status: SHIPPED | OUTPATIENT
Start: 2018-09-25 | End: 2019-01-29 | Stop reason: SDUPTHER

## 2018-09-25 NOTE — PROGRESS NOTES
albuterol sulfate HFA (PROAIR HFA) 108 (90 BASE) MCG/ACT inhaler Inhale 2 puffs into the lungs every 6 hours as needed for Wheezing 3 Inhaler 3       Allergies   Allergen Reactions    Ygsczbpz-Lzzawyn-Sflnfn [Fluocinolone] Shortness Of Breath    Ciprofloxacin Shortness Of Breath    Diovan [Valsartan] Shortness Of Breath    Flagyl [Metronidazole] Shortness Of Breath     Has taken diflucan at home 12/7/15    Metformin And Related [Metformin And Related] Shortness Of Breath    Benazepril      Other reaction(s): Not Recorded    Saxagliptin      Other reaction(s): Not Recorded    Levaquin [Levofloxacin] Rash       Social History   Substance Use Topics    Smoking status: Never Smoker    Smokeless tobacco: Never Used    Alcohol use No       /72 (Site: Right Upper Arm, Position: Sitting, Cuff Size: Large Adult)   Pulse 82   Resp 12   Wt 212 lb (96.2 kg)   SpO2 95%   BMI 32.23 kg/m²     Objective:   Physical Exam   Constitutional: She appears well-developed and well-nourished. She is cooperative. No distress. Neck: Carotid bruit is not present. Cardiovascular: Normal rate and regular rhythm. Murmur heard. Systolic (Right sternal border) murmur is present with a grade of 2/6   Pulses:       Dorsalis pedis pulses are 2+ on the right side, and 2+ on the left side. Posterior tibial pulses are 2+ on the right side, and 2+ on the left side. Pulmonary/Chest: Effort normal and breath sounds normal.   Musculoskeletal: She exhibits no edema or tenderness. Neurological: She is alert. She has normal strength. No sensory deficit. Skin: Rash noted.        Assessment:      Moi Terrell was seen today for follow-up from hospital.    Diagnoses and all orders for this visit:    Type 2 diabetes mellitus with complication, with long-term current use of insulin ([address]    Coronary artery disease of native artery of native heart with stable angina pectoris (HCC)    Persistent atrial fibrillation

## 2018-09-26 PROBLEM — I78.0 OSLER HEMORRHAGIC TELANGIECTASIA SYNDROME (HCC): Status: RESOLVED | Noted: 2018-09-07 | Resolved: 2018-09-26

## 2018-09-26 PROBLEM — K66.8 PNEUMOPERITONEUM: Status: RESOLVED | Noted: 2018-07-28 | Resolved: 2018-09-26

## 2018-09-27 ENCOUNTER — OFFICE VISIT (OUTPATIENT)
Dept: CARDIOTHORACIC SURGERY | Age: 73
End: 2018-09-27

## 2018-09-27 ENCOUNTER — TELEPHONE (OUTPATIENT)
Dept: CARDIOTHORACIC SURGERY | Age: 73
End: 2018-09-27

## 2018-09-27 VITALS
HEART RATE: 96 BPM | OXYGEN SATURATION: 96 % | BODY MASS INDEX: 32.28 KG/M2 | SYSTOLIC BLOOD PRESSURE: 122 MMHG | TEMPERATURE: 98.6 F | WEIGHT: 213 LBS | HEIGHT: 68 IN | DIASTOLIC BLOOD PRESSURE: 62 MMHG

## 2018-09-27 DIAGNOSIS — Z95.1 S/P CABG (CORONARY ARTERY BYPASS GRAFT): ICD-10-CM

## 2018-09-27 DIAGNOSIS — Z86.79 S/P MAZE OPERATION FOR ATRIAL FIBRILLATION: ICD-10-CM

## 2018-09-27 DIAGNOSIS — Z95.2 S/P MITRAL VALVE REPLACEMENT: Primary | ICD-10-CM

## 2018-09-27 DIAGNOSIS — Z98.890 S/P MAZE OPERATION FOR ATRIAL FIBRILLATION: ICD-10-CM

## 2018-09-27 PROCEDURE — 99024 POSTOP FOLLOW-UP VISIT: CPT | Performed by: THORACIC SURGERY (CARDIOTHORACIC VASCULAR SURGERY)

## 2018-09-27 RX ORDER — CLINDAMYCIN HYDROCHLORIDE 300 MG/1
300 CAPSULE ORAL 3 TIMES DAILY
Qty: 30 CAPSULE | Refills: 0 | Status: SHIPPED | OUTPATIENT
Start: 2018-09-27 | End: 2018-10-01

## 2018-09-27 RX ORDER — CLINDAMYCIN HYDROCHLORIDE 300 MG/1
300 CAPSULE ORAL 3 TIMES DAILY
Qty: 30 CAPSULE | Refills: 0 | Status: SHIPPED | OUTPATIENT
Start: 2018-09-27 | End: 2018-09-27 | Stop reason: SDUPTHER

## 2018-09-27 NOTE — PROGRESS NOTES
S/p MVR, MAZE. WAYNE oblitertion CABG x 3 on 8/21/18    midsternal incsion healing well, CT site with scab,  L leg at groin site oozing serosanguinous draiange, area size of orange red, swelling and hot at top of VH site. Blood sugars   Last inr was 2.9  Looks good, is able to continue to walk.

## 2018-09-28 ENCOUNTER — TELEPHONE (OUTPATIENT)
Dept: CARDIOTHORACIC SURGERY | Age: 73
End: 2018-09-28

## 2018-09-28 NOTE — TELEPHONE ENCOUNTER
[facility] Customer Service contacted to verify recent Clindamycin script sent to mail-in pharmacy in error was cancelled, which it was.

## 2018-10-01 ENCOUNTER — NURSE ONLY (OUTPATIENT)
Dept: FAMILY MEDICINE CLINIC | Age: 73
End: 2018-10-01
Payer: MEDICARE

## 2018-10-01 ENCOUNTER — APPOINTMENT (OUTPATIENT)
Dept: GENERAL RADIOLOGY | Age: 73
DRG: 815 | End: 2018-10-01
Payer: MEDICARE

## 2018-10-01 ENCOUNTER — HOSPITAL ENCOUNTER (INPATIENT)
Age: 73
LOS: 11 days | Discharge: HOME HEALTH | DRG: 815 | End: 2018-10-12
Attending: EMERGENCY MEDICINE | Admitting: EMERGENCY MEDICINE
Payer: MEDICARE

## 2018-10-01 ENCOUNTER — ANTI-COAG VISIT (OUTPATIENT)
Dept: FAMILY MEDICINE CLINIC | Age: 73
End: 2018-10-01
Payer: MEDICARE

## 2018-10-01 ENCOUNTER — APPOINTMENT (OUTPATIENT)
Dept: CT IMAGING | Age: 73
DRG: 815 | End: 2018-10-01
Payer: MEDICARE

## 2018-10-01 DIAGNOSIS — R79.1 SUPRATHERAPEUTIC INR: ICD-10-CM

## 2018-10-01 DIAGNOSIS — R10.30 LOWER ABDOMINAL PAIN: Primary | ICD-10-CM

## 2018-10-01 DIAGNOSIS — Z79.01 LONG TERM CURRENT USE OF ANTICOAGULANT: Primary | ICD-10-CM

## 2018-10-01 DIAGNOSIS — R10.9 ABDOMINAL PAIN, UNSPECIFIED ABDOMINAL LOCATION: ICD-10-CM

## 2018-10-01 DIAGNOSIS — I48.91 ATRIAL FIBRILLATION, UNSPECIFIED TYPE (HCC): ICD-10-CM

## 2018-10-01 DIAGNOSIS — R10.11 ABDOMINAL PAIN, RIGHT UPPER QUADRANT: Primary | ICD-10-CM

## 2018-10-01 DIAGNOSIS — K52.9 COLITIS: ICD-10-CM

## 2018-10-01 DIAGNOSIS — R77.8 ELEVATED TROPONIN: ICD-10-CM

## 2018-10-01 DIAGNOSIS — D73.5 SPLENIC INFARCT: ICD-10-CM

## 2018-10-01 LAB
A/G RATIO: 0.6 (ref 1.1–2.2)
ALBUMIN SERPL-MCNC: 2.8 G/DL (ref 3.4–5)
ALP BLD-CCNC: 89 U/L (ref 40–129)
ALT SERPL-CCNC: 13 U/L (ref 10–40)
ANION GAP SERPL CALCULATED.3IONS-SCNC: 11 MMOL/L (ref 3–16)
AST SERPL-CCNC: 13 U/L (ref 15–37)
BACTERIA URINE, POC: ABNORMAL
BACTERIA: ABNORMAL /HPF
BASOPHILS ABSOLUTE: 0 K/UL (ref 0–0.2)
BASOPHILS RELATIVE PERCENT: 0.2 %
BILIRUB SERPL-MCNC: 0.3 MG/DL (ref 0–1)
BILIRUBIN URINE: 0 MG/DL
BILIRUBIN URINE: NEGATIVE
BLOOD, URINE: ABNORMAL
BLOOD, URINE: POSITIVE
BUN BLDV-MCNC: 18 MG/DL (ref 7–20)
CALCIUM SERPL-MCNC: 9.4 MG/DL (ref 8.3–10.6)
CASTS URINE, POC: ABNORMAL
CHLORIDE BLD-SCNC: 100 MMOL/L (ref 99–110)
CLARITY: CLEAR
CLARITY: CLEAR
CO2: 25 MMOL/L (ref 21–32)
COLOR: YELLOW
COLOR: YELLOW
CREAT SERPL-MCNC: 1.3 MG/DL (ref 0.6–1.2)
CRYSTALS URINE, POC: ABNORMAL
EOSINOPHILS ABSOLUTE: 0.1 K/UL (ref 0–0.6)
EOSINOPHILS RELATIVE PERCENT: 1.4 %
EPI CELLS URINE, POC: ABNORMAL
EPITHELIAL CELLS, UA: ABNORMAL /HPF
GFR AFRICAN AMERICAN: 49
GFR NON-AFRICAN AMERICAN: 40
GLOBULIN: 4.6 G/DL
GLUCOSE BLD-MCNC: 167 MG/DL (ref 70–99)
GLUCOSE URINE: ABNORMAL
GLUCOSE URINE: NEGATIVE MG/DL
HCT VFR BLD CALC: 30.1 % (ref 36–48)
HEMOGLOBIN: 9.2 G/DL (ref 12–16)
INR BLD: 5.27 (ref 0.86–1.14)
INTERNATIONAL NORMALIZATION RATIO, POC: 8
KETONES, URINE: NEGATIVE
KETONES, URINE: NEGATIVE MG/DL
LACTIC ACID: 1.1 MMOL/L (ref 0.4–2)
LEUKOCYTE EST, POC: ABNORMAL
LEUKOCYTE ESTERASE, URINE: NEGATIVE
LIPASE: 60 U/L (ref 13–60)
LYMPHOCYTES ABSOLUTE: 0.9 K/UL (ref 1–5.1)
LYMPHOCYTES RELATIVE PERCENT: 10.2 %
MCH RBC QN AUTO: 29.6 PG (ref 26–34)
MCHC RBC AUTO-ENTMCNC: 30.5 G/DL (ref 31–36)
MCV RBC AUTO: 97.2 FL (ref 80–100)
MICROSCOPIC EXAMINATION: YES
MONOCYTES ABSOLUTE: 0.6 K/UL (ref 0–1.3)
MONOCYTES RELATIVE PERCENT: 6.4 %
NEUTROPHILS ABSOLUTE: 7.2 K/UL (ref 1.7–7.7)
NEUTROPHILS RELATIVE PERCENT: 81.8 %
NITRITE, URINE: NEGATIVE
NITRITE, URINE: NEGATIVE
PDW BLD-RTO: 16.9 % (ref 12.4–15.4)
PH UA: 5.5
PH UA: 8.5 (ref 4.5–8)
PLATELET # BLD: 301 K/UL (ref 135–450)
PMV BLD AUTO: 7.6 FL (ref 5–10.5)
POTASSIUM REFLEX MAGNESIUM: 4.3 MMOL/L (ref 3.5–5.1)
PROTEIN UA: 30 MG/DL
PROTEIN UA: ABNORMAL
PROTHROMBIN TIME, POC: ABNORMAL
PROTHROMBIN TIME: 60.1 SEC (ref 9.8–13)
RBC # BLD: 3.1 M/UL (ref 4–5.2)
RBC UA: ABNORMAL /HPF (ref 0–2)
RBC URINE, POC: ABNORMAL
REASON FOR REJECTION: NORMAL
REJECTED TEST: NORMAL
SODIUM BLD-SCNC: 136 MMOL/L (ref 136–145)
SPECIFIC GRAVITY UA: 1.01
SPECIFIC GRAVITY UA: 1.01 (ref 1–1.03)
TOTAL PROTEIN: 7.4 G/DL (ref 6.4–8.2)
TROPONIN: 0.03 NG/ML
URINE REFLEX TO CULTURE: ABNORMAL
URINE TYPE: ABNORMAL
UROBILINOGEN, URINE: 0.2 E.U./DL
UROBILINOGEN, URINE: NORMAL
WBC # BLD: 8.8 K/UL (ref 4–11)
WBC UA: ABNORMAL /HPF (ref 0–5)
WBC URINE, POC: ABNORMAL
YEAST URINE, POC: ABNORMAL

## 2018-10-01 PROCEDURE — 85610 PROTHROMBIN TIME: CPT

## 2018-10-01 PROCEDURE — 81000 URINALYSIS NONAUTO W/SCOPE: CPT | Performed by: FAMILY MEDICINE

## 2018-10-01 PROCEDURE — 74177 CT ABD & PELVIS W/CONTRAST: CPT

## 2018-10-01 PROCEDURE — 36415 COLL VENOUS BLD VENIPUNCTURE: CPT

## 2018-10-01 PROCEDURE — 87801 DETECT AGNT MULT DNA AMPLI: CPT

## 2018-10-01 PROCEDURE — 85025 COMPLETE CBC W/AUTO DIFF WBC: CPT

## 2018-10-01 PROCEDURE — 71045 X-RAY EXAM CHEST 1 VIEW: CPT

## 2018-10-01 PROCEDURE — 99285 EMERGENCY DEPT VISIT HI MDM: CPT

## 2018-10-01 PROCEDURE — 2060000000 HC ICU INTERMEDIATE R&B

## 2018-10-01 PROCEDURE — 84484 ASSAY OF TROPONIN QUANT: CPT

## 2018-10-01 PROCEDURE — G0378 HOSPITAL OBSERVATION PER HR: HCPCS

## 2018-10-01 PROCEDURE — 87040 BLOOD CULTURE FOR BACTERIA: CPT

## 2018-10-01 PROCEDURE — 80053 COMPREHEN METABOLIC PANEL: CPT

## 2018-10-01 PROCEDURE — 83690 ASSAY OF LIPASE: CPT

## 2018-10-01 PROCEDURE — 93005 ELECTROCARDIOGRAM TRACING: CPT | Performed by: PHYSICIAN ASSISTANT

## 2018-10-01 PROCEDURE — 81001 URINALYSIS AUTO W/SCOPE: CPT | Performed by: FAMILY MEDICINE

## 2018-10-01 PROCEDURE — 6360000004 HC RX CONTRAST MEDICATION: Performed by: PHYSICIAN ASSISTANT

## 2018-10-01 PROCEDURE — 85610 PROTHROMBIN TIME: CPT | Performed by: FAMILY MEDICINE

## 2018-10-01 PROCEDURE — 96375 TX/PRO/DX INJ NEW DRUG ADDON: CPT

## 2018-10-01 PROCEDURE — 87186 SC STD MICRODIL/AGAR DIL: CPT

## 2018-10-01 PROCEDURE — 6360000002 HC RX W HCPCS: Performed by: PHYSICIAN ASSISTANT

## 2018-10-01 PROCEDURE — 96374 THER/PROPH/DIAG INJ IV PUSH: CPT

## 2018-10-01 PROCEDURE — 84145 PROCALCITONIN (PCT): CPT

## 2018-10-01 PROCEDURE — 83605 ASSAY OF LACTIC ACID: CPT

## 2018-10-01 PROCEDURE — 81001 URINALYSIS AUTO W/SCOPE: CPT

## 2018-10-01 RX ORDER — ONDANSETRON 2 MG/ML
4 INJECTION INTRAMUSCULAR; INTRAVENOUS ONCE
Status: COMPLETED | OUTPATIENT
Start: 2018-10-01 | End: 2018-10-01

## 2018-10-01 RX ORDER — HYDROMORPHONE HCL 110MG/55ML
1 PATIENT CONTROLLED ANALGESIA SYRINGE INTRAVENOUS ONCE
Status: COMPLETED | OUTPATIENT
Start: 2018-10-01 | End: 2018-10-01

## 2018-10-01 RX ORDER — TORSEMIDE 20 MG/1
40 TABLET ORAL DAILY
Qty: 42 TABLET | Refills: 0 | Status: SHIPPED | OUTPATIENT
Start: 2018-10-01 | End: 2018-11-03

## 2018-10-01 RX ORDER — ONDANSETRON 2 MG/ML
4 INJECTION INTRAMUSCULAR; INTRAVENOUS ONCE
Status: DISCONTINUED | OUTPATIENT
Start: 2018-10-01 | End: 2018-10-12 | Stop reason: HOSPADM

## 2018-10-01 RX ORDER — CEPHALEXIN 500 MG/1
500 CAPSULE ORAL 3 TIMES DAILY
Qty: 30 CAPSULE | Refills: 0 | Status: ON HOLD | OUTPATIENT
Start: 2018-10-01 | End: 2018-10-11 | Stop reason: HOSPADM

## 2018-10-01 RX ORDER — MORPHINE SULFATE 4 MG/ML
4 INJECTION, SOLUTION INTRAMUSCULAR; INTRAVENOUS ONCE
Status: DISCONTINUED | OUTPATIENT
Start: 2018-10-01 | End: 2018-10-01

## 2018-10-01 RX ADMIN — IOPAMIDOL 75 ML: 755 INJECTION, SOLUTION INTRAVENOUS at 18:18

## 2018-10-01 RX ADMIN — ONDANSETRON 4 MG: 2 INJECTION INTRAMUSCULAR; INTRAVENOUS at 19:15

## 2018-10-01 RX ADMIN — HYDROMORPHONE HYDROCHLORIDE 1 MG: 2 INJECTION INTRAMUSCULAR; INTRAVENOUS; SUBCUTANEOUS at 19:16

## 2018-10-01 ASSESSMENT — PAIN SCALES - GENERAL
PAINLEVEL_OUTOF10: 10
PAINLEVEL_OUTOF10: 10

## 2018-10-01 ASSESSMENT — ENCOUNTER SYMPTOMS
WHEEZING: 0
ABDOMINAL PAIN: 1
DIARRHEA: 0
SHORTNESS OF BREATH: 0
COUGH: 0
VOMITING: 0
RHINORRHEA: 0
NAUSEA: 0

## 2018-10-01 ASSESSMENT — PAIN DESCRIPTION - PAIN TYPE: TYPE: ACUTE PAIN

## 2018-10-01 ASSESSMENT — PAIN DESCRIPTION - ORIENTATION: ORIENTATION: RIGHT

## 2018-10-01 ASSESSMENT — PAIN DESCRIPTION - LOCATION: LOCATION: ABDOMEN

## 2018-10-01 NOTE — ED PROVIDER NOTES
Gastrointestinal: Positive for abdominal pain. Negative for diarrhea, nausea and vomiting. Genitourinary: Negative for difficulty urinating, dysuria and hematuria. Musculoskeletal: Negative for neck pain and neck stiffness. Skin: Negative for rash. Neurological: Negative for headaches. Positives and Pertinent negatives as per HPI. Except as noted above in the ROS, all other systems were reviewed and negative.        PAST MEDICAL HISTORY     Past Medical History:   Diagnosis Date    Asthma     Atrial fibrillation (HCC)     Eosinophilia     Hemoptysis     HIGH CHOLESTEROL     Hx of blood clots     Hypertension     Irregular heart beat     Other specified gastritis without mention of hemorrhage     Palpitations     Skin cancer     basal and squamous    Type II or unspecified type diabetes mellitus without mention of complication, not stated as uncontrolled          SURGICAL HISTORY       Past Surgical History:   Procedure Laterality Date    BRONCHOSCOPY  07/18/2016    Dr. Aki Grimm - brushings from [address] [place] [address],[facility]  08/16/2018    Dr. Alanis Sanches  02/07/2017    Dr. Savanna Oquendo - sigmoid diverticulosis, polypectomies x3    COLONOSCOPY  01/17/2014    Dr. Savanna Oquendo - sigmoid diverticulosis, polypectomies x3, internal hemorrhoids    CORONARY ARTERY BYPASS GRAFT  08/21/2018    Dr. Franco Velasquez - x3 (LIMA-LAD, L SV-D1-PLV) modified BL MAZE procedure w/obliteration of WAYNE using 45mm AtriClip   Maren Lot Left 08/16/2018    Dr. Erika Ballesteros - Adiel Nichols # [id number] [employer]    MITRAL VALVE REPLACEMENT  08/21/2018    Dr. Franco Velasquez - 27mm [employer] Cinch tissue valve    SKIN BIOPSY      TRANSESOPHAGEAL ECHOCARDIOGRAM  08/21/2018    during CABG/MVR    TUNNELED VENOUS CATHETER PLACEMENT Left 08/23/2018    Dr. Moriah Padron - IJ for HD         CURRENT MEDICATIONS       Previous Medications    ALBUTEROL Normocephalic and atraumatic. Right Ear: External ear normal.   Left Ear: External ear normal.   Nose: Nose normal.   Eyes: Right eye exhibits no discharge. Left eye exhibits no discharge. Neck: Normal range of motion. Neck supple. Cardiovascular: Normal rate, regular rhythm and normal heart sounds. Exam reveals no gallop and no friction rub. No murmur heard. Pulmonary/Chest: Effort normal and breath sounds normal. No respiratory distress. She has no wheezes. She has no rales. Abdominal: Soft. She exhibits no distension and no mass. There is tenderness in the right upper quadrant. There is guarding. There is no rebound. Musculoskeletal: Normal range of motion. Neurological: She is alert and oriented to person, place, and time. Skin: Skin is warm and dry. She is not diaphoretic. Psychiatric: She has a normal mood and affect. Her behavior is normal.   Nursing note and vitals reviewed.       DIAGNOSTIC RESULTS   LABS:    Labs Reviewed   CBC WITH AUTO DIFFERENTIAL - Abnormal; Notable for the following:        Result Value    RBC 3.10 (*)     Hemoglobin 9.2 (*)     Hematocrit 30.1 (*)     MCHC 30.5 (*)     RDW 16.9 (*)     Lymphocytes # 0.9 (*)     All other components within normal limits    Narrative:     Performed at:  [facility]  [address] [facility] [employer]   Phone [phone]   COMPREHENSIVE METABOLIC PANEL W/ REFLEX TO MG FOR LOW K - Abnormal; Notable for the following:     Glucose 167 (*)     CREATININE 1.3 (*)     GFR Non- 40 (*)     GFR  49 (*)     Alb 2.8 (*)     Albumin/Globulin Ratio 0.6 (*)     AST 13 (*)     All other components within normal limits    Narrative:     Performed at:  [facility]  [address] [facility], [address] [employer]   Phone [phone]   URINE RT REFLEX TO CULTURE - Abnormal; Notable for the following:     Blood, Urine TRACE-INTACT (*)     Protein, UA 30 (*) All other components within normal limits    Narrative:     Performed at:  [facility] [address] [facility]  [place], [address] [employer]   Phone [phone]   TROPONIN - Abnormal; Notable for the following:     Troponin 0.03 (*)     All other components within normal limits    Narrative:     Performed at:  [facility] [address] [facility],  [place], [address] [employer]   Phone [phone]   MICROSCOPIC URINALYSIS - Abnormal; Notable for the following:     Bacteria, UA Rare (*)     All other components within normal limits    Narrative:     Performed at:  [facility] [address] [facility],  [place], [address] [employer]   Phone [phone]   PROTIME-INR - Abnormal; Notable for the following:     Protime 60.1 (*)     INR 5.27 (*)     All other components within normal limits    Narrative:     CALL  [place]  [facility] tel. [phone],  Coag results called to and read back by RN Valentino Stark, 10/01/2018 22:32,  by Banner  Performed at:  [facility] [address] [facility], [address] [employer]   Phone [phone]   CULTURE BLOOD #1   CULTURE BLOOD #2   LIPASE    Narrative:     Performed at:  [facility] [address] [facility], [address] [employer]   Phone [phone]   LACTIC ACID, PLASMA    Narrative:     Performed at:  [facility] [address], [place], [address] [employer]   Phone [phone]    Narrative:     Jelena Donnelly [phone],  Rejected Test Name/Called to:PT/ RN Valentino Stark, 10/01/2018 21:28, by Banner  Performed at:  [facility] [address], [place], [facility] [employer]   Phone [phone]       All other labs were within normal range or not returned as of this dictation. EKG:  All EKG's are interpreted by the Emergency Department Physician who either signs or Co-signs this chart in the absence Intravenous Given 10/1/18 1818)   HYDROmorphone (DILAUDID) injection 1 mg (1 mg Intravenous Given 10/1/18 1916)       Patient presents for evaluation of right upper quadrant abdominal pain. On exam, she is resting comfortably in bed in no acute distress and nontoxic. Vitals are stable and she is afebrile. Lungs are clear to auscultation bilaterally. Shows exquisite tenderness to the right upper quadrant with voluntary guarding. No other peritoneal signs. History of cholecystectomy. Remainder of abdomen benign. She has packing in place of abscess to her left inner thigh. There is surrounding induration, but no significant erythema or warmth. Dressing reapplied by nursing staff. Patient was given IV fluids, Dilaudid and Zofran for symptomatic relief and will be reevaluated. Please see attending note for EKG interpretation. CBC and CMP are relatively unremarkable. Lipase 60. Troponin slightly elevated at 0.03. Urinalysis is negative. Coags are elevated with an INR of 5.27. Chest x-ray shows no definite acute abnormality detected. CT of abdomen and pelvis shows mucosal thickening and fat associated with proximal transverse colon suggestive of infectious or inflammatory colitis. There is also a new irregular wedge-shaped heterogeneous hypodensity in the superior medial spleen concerning for infarct. She was started on Zosyn due to ciprofloxacin and Flagyl allergy. 20. Patient was admission for further evaluation, cardiology consultation and treatment of colitis as well as possible splenic infarct. My attending spoke with the hospitalist is agreeable to this plan and will resume care of the patient at this time. Patient was also informed and is agreeable. She is stable for admission. The patient tolerated their visit well. They were seen and evaluated by the attending physician who agreed with the assessment and plan.   The patient and / or the family were informed of the results of any tests, a time was given to answer questions, a plan was proposed and they agreed with plan. FINAL IMPRESSION      1. Abdominal pain, right upper quadrant    2. Colitis    3. Splenic infarct    4. Supratherapeutic INR    5. Elevated troponin          DISPOSITION/PLAN   DISPOSITION Admitted 10/01/2018 10:00:10 PM      PATIENT REFERRED TO:  MD Kaylee Beasley  [phone]            DISCHARGE MEDICATIONS:  New Prescriptions    No medications on file       DISCONTINUED MEDICATIONS:  Discontinued Medications    No medications on file         Attestation: The Prescription Monitoring Report for this patient was reviewed today.  (Estela Skinner MD)    (Please note that portions of this note were completed with a voice recognition program.  Efforts were made to edit the dictations but occasionally words are mis-transcribed.)    Mainor Zavala PA-C (electronically signed)           Micheline Pedraza, [place]  10/01/18 [id number]

## 2018-10-02 LAB
ANION GAP SERPL CALCULATED.3IONS-SCNC: 10 MMOL/L (ref 3–16)
APTT: 50.7 SEC (ref 26–36)
BASOPHILS ABSOLUTE: 0 K/UL (ref 0–0.2)
BASOPHILS RELATIVE PERCENT: 0.2 %
BUN / CREAT RATIO: 13 (CALC) (ref 6–22)
BUN BLDV-MCNC: 18 MG/DL (ref 7–25)
BUN BLDV-MCNC: 19 MG/DL (ref 7–20)
CALCIUM SERPL-MCNC: 8.7 MG/DL (ref 8.6–10.4)
CALCIUM SERPL-MCNC: 9.4 MG/DL (ref 8.3–10.6)
CHLORIDE BLD-SCNC: 101 MMOL/L (ref 99–110)
CHLORIDE BLD-SCNC: 102 MMOL/L (ref 98–110)
CO2: 26 MMOL/L (ref 20–32)
CO2: 28 MMOL/L (ref 21–32)
CREAT SERPL-MCNC: 1.3 MG/DL (ref 0.6–1.2)
CREAT SERPL-MCNC: 1.44 MG/DL (ref 0.6–0.93)
DIGOXIN LEVEL: <0.5 MCG/L (ref 0.8–2)
EOSINOPHILS ABSOLUTE: 0.2 K/UL (ref 0–0.6)
EOSINOPHILS RELATIVE PERCENT: 1.9 %
GFR AFRICAN AMERICAN: 42 ML/MIN/1.73M2
GFR AFRICAN AMERICAN: 49
GFR NON-AFRICAN AMERICAN: 40
GFR SERPL CREATININE-BSD FRML MDRD: 36 ML/MIN/1.73M2
GLUCOSE BLD-MCNC: 102 MG/DL (ref 70–99)
GLUCOSE BLD-MCNC: 106 MG/DL (ref 70–99)
GLUCOSE BLD-MCNC: 131 MG/DL (ref 70–99)
GLUCOSE BLD-MCNC: 80 MG/DL (ref 70–99)
GLUCOSE BLD-MCNC: 91 MG/DL (ref 65–139)
GLUCOSE BLD-MCNC: 98 MG/DL (ref 70–99)
GLUCOSE BLD-MCNC: 99 MG/DL (ref 70–99)
HCT VFR BLD CALC: 24.9 % (ref 36–48)
HCT VFR BLD CALC: 27 % (ref 35–45)
HEMOGLOBIN: 8.2 G/DL (ref 12–16)
HEMOGLOBIN: 8.5 G/DL (ref 11.7–15.5)
INR BLD: 4.01 (ref 0.86–1.14)
LACTIC ACID: 0.9 MMOL/L (ref 0.4–2)
LACTIC ACID: 1.2 MMOL/L (ref 0.4–2)
LYMPHOCYTES ABSOLUTE: 1.1 K/UL (ref 1–5.1)
LYMPHOCYTES RELATIVE PERCENT: 11.9 %
MCH RBC QN AUTO: 29.6 PG (ref 27–33)
MCH RBC QN AUTO: 30 PG (ref 26–34)
MCHC RBC AUTO-ENTMCNC: 31.5 G/DL (ref 32–36)
MCHC RBC AUTO-ENTMCNC: 32.8 G/DL (ref 31–36)
MCV RBC AUTO: 91.3 FL (ref 80–100)
MCV RBC AUTO: 94.1 FL (ref 80–100)
MONOCYTES ABSOLUTE: 0.7 K/UL (ref 0–1.3)
MONOCYTES RELATIVE PERCENT: 8 %
NEUTROPHILS ABSOLUTE: 6.9 K/UL (ref 1.7–7.7)
NEUTROPHILS RELATIVE PERCENT: 78 %
PDW BLD-RTO: 14.7 % (ref 11–15)
PDW BLD-RTO: 16.4 % (ref 12.4–15.4)
PERFORMED ON: ABNORMAL
PERFORMED ON: ABNORMAL
PERFORMED ON: NORMAL
PLATELET # BLD: 302 K/UL (ref 135–450)
PLATELET # BLD: 318 THOUSAND/UL (ref 140–400)
PMV BLD AUTO: 11.1 FL (ref 7.5–12.5)
PMV BLD AUTO: 8 FL (ref 5–10.5)
POTASSIUM SERPL-SCNC: 4.3 MMOL/L (ref 3.5–5.1)
POTASSIUM SERPL-SCNC: 4.3 MMOL/L (ref 3.5–5.3)
PROCALCITONIN: 0.08 NG/ML (ref 0–0.15)
PROTHROMBIN TIME: 45.7 SEC (ref 9.8–13)
RBC # BLD: 2.72 M/UL (ref 4–5.2)
RBC # BLD: 2.87 MILLION/UL (ref 3.8–5.1)
REPORT: NORMAL
SODIUM BLD-SCNC: 139 MMOL/L (ref 135–146)
SODIUM BLD-SCNC: 139 MMOL/L (ref 136–145)
TROPONIN: 0.03 NG/ML
TROPONIN: 0.04 NG/ML
WBC # BLD: 8.3 THOUSAND/UL (ref 3.8–10.8)
WBC # BLD: 8.9 K/UL (ref 4–11)

## 2018-10-02 PROCEDURE — 2580000003 HC RX 258

## 2018-10-02 PROCEDURE — 36415 COLL VENOUS BLD VENIPUNCTURE: CPT

## 2018-10-02 PROCEDURE — 85610 PROTHROMBIN TIME: CPT

## 2018-10-02 PROCEDURE — 85730 THROMBOPLASTIN TIME PARTIAL: CPT

## 2018-10-02 PROCEDURE — 80048 BASIC METABOLIC PNL TOTAL CA: CPT

## 2018-10-02 PROCEDURE — 83605 ASSAY OF LACTIC ACID: CPT

## 2018-10-02 PROCEDURE — 99222 1ST HOSP IP/OBS MODERATE 55: CPT | Performed by: SURGERY

## 2018-10-02 PROCEDURE — 84484 ASSAY OF TROPONIN QUANT: CPT

## 2018-10-02 PROCEDURE — 93010 ELECTROCARDIOGRAM REPORT: CPT | Performed by: INTERNAL MEDICINE

## 2018-10-02 PROCEDURE — APPSS60 APP SPLIT SHARED TIME 46-60 MINUTES: Performed by: NURSE PRACTITIONER

## 2018-10-02 PROCEDURE — APPNB30 APP NON BILLABLE TIME 0-30 MINS: Performed by: NURSE PRACTITIONER

## 2018-10-02 PROCEDURE — 2500000003 HC RX 250 WO HCPCS: Performed by: SURGERY

## 2018-10-02 PROCEDURE — 6360000002 HC RX W HCPCS: Performed by: INTERNAL MEDICINE

## 2018-10-02 PROCEDURE — 2580000003 HC RX 258: Performed by: INTERNAL MEDICINE

## 2018-10-02 PROCEDURE — 2060000000 HC ICU INTERMEDIATE R&B

## 2018-10-02 PROCEDURE — 6360000002 HC RX W HCPCS: Performed by: NURSE PRACTITIONER

## 2018-10-02 PROCEDURE — 85025 COMPLETE CBC W/AUTO DIFF WBC: CPT

## 2018-10-02 PROCEDURE — 6370000000 HC RX 637 (ALT 250 FOR IP): Performed by: INTERNAL MEDICINE

## 2018-10-02 PROCEDURE — 6370000000 HC RX 637 (ALT 250 FOR IP): Performed by: NURSE PRACTITIONER

## 2018-10-02 RX ORDER — POTASSIUM CHLORIDE 7.45 MG/ML
10 INJECTION INTRAVENOUS PRN
Status: DISCONTINUED | OUTPATIENT
Start: 2018-10-02 | End: 2018-10-12 | Stop reason: HOSPADM

## 2018-10-02 RX ORDER — SODIUM CHLORIDE 9 MG/ML
INJECTION, SOLUTION INTRAVENOUS
Status: COMPLETED
Start: 2018-10-02 | End: 2018-10-02

## 2018-10-02 RX ORDER — SODIUM CHLORIDE 0.9 % (FLUSH) 0.9 %
10 SYRINGE (ML) INJECTION EVERY 12 HOURS SCHEDULED
Status: DISCONTINUED | OUTPATIENT
Start: 2018-10-02 | End: 2018-10-12 | Stop reason: HOSPADM

## 2018-10-02 RX ORDER — PRAVASTATIN SODIUM 40 MG
40 TABLET ORAL NIGHTLY
Status: DISCONTINUED | OUTPATIENT
Start: 2018-10-02 | End: 2018-10-12 | Stop reason: HOSPADM

## 2018-10-02 RX ORDER — HYDROMORPHONE HCL 110MG/55ML
1 PATIENT CONTROLLED ANALGESIA SYRINGE INTRAVENOUS EVERY 4 HOURS PRN
Status: DISCONTINUED | OUTPATIENT
Start: 2018-10-02 | End: 2018-10-07

## 2018-10-02 RX ORDER — SODIUM CHLORIDE 0.9 % (FLUSH) 0.9 %
10 SYRINGE (ML) INJECTION PRN
Status: DISCONTINUED | OUTPATIENT
Start: 2018-10-02 | End: 2018-10-12 | Stop reason: HOSPADM

## 2018-10-02 RX ORDER — DEXTROSE MONOHYDRATE 50 MG/ML
100 INJECTION, SOLUTION INTRAVENOUS PRN
Status: DISCONTINUED | OUTPATIENT
Start: 2018-10-02 | End: 2018-10-12 | Stop reason: HOSPADM

## 2018-10-02 RX ORDER — ACETAMINOPHEN 80 MG
TABLET,CHEWABLE ORAL
Status: COMPLETED
Start: 2018-10-02 | End: 2018-10-02

## 2018-10-02 RX ORDER — MAGNESIUM SULFATE 1 G/100ML
1 INJECTION INTRAVENOUS PRN
Status: DISCONTINUED | OUTPATIENT
Start: 2018-10-02 | End: 2018-10-12 | Stop reason: HOSPADM

## 2018-10-02 RX ORDER — MONTELUKAST SODIUM 10 MG/1
10 TABLET ORAL NIGHTLY
Status: DISCONTINUED | OUTPATIENT
Start: 2018-10-02 | End: 2018-10-12 | Stop reason: HOSPADM

## 2018-10-02 RX ORDER — DEXTROSE MONOHYDRATE 25 G/50ML
12.5 INJECTION, SOLUTION INTRAVENOUS PRN
Status: DISCONTINUED | OUTPATIENT
Start: 2018-10-02 | End: 2018-10-12 | Stop reason: HOSPADM

## 2018-10-02 RX ORDER — NICOTINE POLACRILEX 4 MG
15 LOZENGE BUCCAL PRN
Status: DISCONTINUED | OUTPATIENT
Start: 2018-10-02 | End: 2018-10-12 | Stop reason: HOSPADM

## 2018-10-02 RX ORDER — DIGOXIN 125 MCG
0.06 TABLET ORAL DAILY
Status: DISCONTINUED | OUTPATIENT
Start: 2018-10-02 | End: 2018-10-12 | Stop reason: HOSPADM

## 2018-10-02 RX ORDER — CARVEDILOL 3.12 MG/1
3.12 TABLET ORAL 2 TIMES DAILY WITH MEALS
Status: DISCONTINUED | OUTPATIENT
Start: 2018-10-02 | End: 2018-10-12 | Stop reason: HOSPADM

## 2018-10-02 RX ORDER — ONDANSETRON 2 MG/ML
4 INJECTION INTRAMUSCULAR; INTRAVENOUS EVERY 6 HOURS PRN
Status: DISCONTINUED | OUTPATIENT
Start: 2018-10-02 | End: 2018-10-12 | Stop reason: HOSPADM

## 2018-10-02 RX ADMIN — ONDANSETRON 4 MG: 2 INJECTION INTRAMUSCULAR; INTRAVENOUS at 19:57

## 2018-10-02 RX ADMIN — PRAVASTATIN SODIUM 40 MG: 40 TABLET ORAL at 19:57

## 2018-10-02 RX ADMIN — HYDROMORPHONE HYDROCHLORIDE 1 MG: 2 INJECTION INTRAMUSCULAR; INTRAVENOUS; SUBCUTANEOUS at 09:12

## 2018-10-02 RX ADMIN — TAZOBACTAM SODIUM AND PIPERACILLIN SODIUM 3.38 G: 375; 3 INJECTION, SOLUTION INTRAVENOUS at 12:41

## 2018-10-02 RX ADMIN — HYDROMORPHONE HYDROCHLORIDE 0.5 MG: 1 INJECTION, SOLUTION INTRAMUSCULAR; INTRAVENOUS; SUBCUTANEOUS at 05:00

## 2018-10-02 RX ADMIN — HYDROMORPHONE HYDROCHLORIDE 1 MG: 2 INJECTION INTRAMUSCULAR; INTRAVENOUS; SUBCUTANEOUS at 19:57

## 2018-10-02 RX ADMIN — MONTELUKAST SODIUM 10 MG: 10 TABLET, FILM COATED ORAL at 19:57

## 2018-10-02 RX ADMIN — Medication: at 14:53

## 2018-10-02 RX ADMIN — CARVEDILOL 3.12 MG: 3.12 TABLET, FILM COATED ORAL at 14:08

## 2018-10-02 RX ADMIN — TAZOBACTAM SODIUM AND PIPERACILLIN SODIUM 3.38 G: 375; 3 INJECTION, SOLUTION INTRAVENOUS at 17:28

## 2018-10-02 RX ADMIN — ONDANSETRON 4 MG: 2 INJECTION INTRAMUSCULAR; INTRAVENOUS at 01:26

## 2018-10-02 RX ADMIN — ONDANSETRON 4 MG: 2 INJECTION INTRAMUSCULAR; INTRAVENOUS at 09:11

## 2018-10-02 RX ADMIN — Medication 10 ML: at 14:12

## 2018-10-02 RX ADMIN — DIGOXIN 0.06 MG: 125 TABLET ORAL at 14:09

## 2018-10-02 RX ADMIN — HYDROMORPHONE HYDROCHLORIDE 1 MG: 2 INJECTION INTRAMUSCULAR; INTRAVENOUS; SUBCUTANEOUS at 14:24

## 2018-10-02 RX ADMIN — PRAVASTATIN SODIUM 40 MG: 40 TABLET ORAL at 01:26

## 2018-10-02 RX ADMIN — SODIUM CHLORIDE: 9 INJECTION, SOLUTION INTRAVENOUS at 14:52

## 2018-10-02 RX ADMIN — Medication 10 ML: at 19:58

## 2018-10-02 RX ADMIN — HYDROMORPHONE HYDROCHLORIDE 0.5 MG: 1 INJECTION, SOLUTION INTRAMUSCULAR; INTRAVENOUS; SUBCUTANEOUS at 01:55

## 2018-10-02 RX ADMIN — ASPIRIN 325 MG: 325 TABLET, DELAYED RELEASE ORAL at 14:09

## 2018-10-02 ASSESSMENT — PAIN DESCRIPTION - PAIN TYPE
TYPE: ACUTE PAIN

## 2018-10-02 ASSESSMENT — PAIN SCALES - GENERAL
PAINLEVEL_OUTOF10: 0
PAINLEVEL_OUTOF10: 0
PAINLEVEL_OUTOF10: 6
PAINLEVEL_OUTOF10: 4
PAINLEVEL_OUTOF10: 10
PAINLEVEL_OUTOF10: 7
PAINLEVEL_OUTOF10: 0

## 2018-10-02 ASSESSMENT — PAIN DESCRIPTION - DESCRIPTORS
DESCRIPTORS: ACHING
DESCRIPTORS: ACHING
DESCRIPTORS: SHARP

## 2018-10-02 ASSESSMENT — PAIN DESCRIPTION - LOCATION
LOCATION: ABDOMEN

## 2018-10-02 ASSESSMENT — PAIN DESCRIPTION - ORIENTATION: ORIENTATION: RIGHT

## 2018-10-02 NOTE — CARE COORDINATION
[address] Transitions Interview     10/2/2018    Patient: Bernadette Niño Patient : 1945   MRN: [id number]  Reason for Admission: abd pain  RARS: Readmission Risk Score: 24       Spoke with: Bernadette Niño      Readmission Risk  Patient Active Problem List   Diagnosis    Long term current use of anticoagulant    Mixed hyperlipidemia    Essential hypertension    Generalized osteoarthrosis, involving multiple sites    Eosinophilic gastritis    Paroxysmal SVT (supraventricular tachycardia) (HCC)    B12 deficiency    History of pulmonary embolism    Multiple pulmonary nodules    DM2 (diabetes mellitus, type 2) ([facility]    Asthma    Atrial fibrillation ([facility]    Hypertension    Abnormal electrocardiogram    Palpitation    Cardiac arrhythmia    Unstable angina ([facility]    Encounter for electronic analysis of reveal event recorder    Coronary artery disease of native artery of native heart with stable angina pectoris ([facility]    Non-rheumatic mitral regurgitation    S/P mitral valve replacement with bioprosthetic valve    S/P coronary artery bypass graft x 3    S/P Maze operation for atrial fibrillation    Goiter    Primary osteoarthritis of both knees    Squamous cell carcinoma of lip    Splenic infarct       Inpatient Assessment  Care Transitions Summary    Care Transitions Inpatient Review  Medication Review  Do you have all of your prescriptions and are they filled?:  Yes   Are you able to afford your medications?:  Yes  How often do you have difficulty taking your medications?:  I always take them as prescribed.   Housing Review  Who do you live with?:  Partner/Spouse/SO  Are you an active caregiver in your home?:  No  Social Support  Do you have a ?:  No  Do you have a [facility]?:  No  Durable Medical Equipment  Patient DME:  Shower chair  Functional Review  Ability to seek help/take action for Emergent/Urgent situations i.e. fire, crime, inclement

## 2018-10-02 NOTE — CONSULTS
(SINGULAIR) tablet 10 mg  10 mg Oral Nightly Danisha Gomez, APRN - CNP        warfarin (COUMADIN) daily dosing (placeholder)   Other RX Placeholder Adriel Hamilton MD        HYDROmorphone (DILAUDID) injection 1 mg  1 mg Intravenous Q4H PRN Adriel Hong MD   1 mg at 10/02/18 1424    piperacillin-tazobactam (ZOSYN) 3.375 g in dextrose 50 mL IVPB extended infusion (premix)  3.375 g Intravenous Q8H Alana Jones MD 12.5 mL/hr at 10/02/18 1241 3.375 g at 10/02/18 1241    ondansetron (ZOFRAN) injection 4 mg  4 mg Intravenous Once [facility]   Stopped at 10/01/18 1230       Allergies:  Vlhymyvn-xrljebt-vjhtup [fluocinolone]; Ciprofloxacin; Diovan [valsartan]; Flagyl [metronidazole]; Metformin and related [metformin and related]; Benazepril; Morphine; Saxagliptin; and Levaquin [levofloxacin]    Social History:    TOBACCO:   reports that she has never smoked. She has never used smokeless tobacco.  ETOH:   reports that she does not drink alcohol. DRUGS:   reports that she does not use drugs.   LIFESTYLE: Active    MARITAL STATUS:    OCCUPATION:  Retired     Family History:    Family History   Problem Relation Age of Onset    Cancer Father     Asthma Mother     Hypertension Mother     Heart Disease Mother     High Blood Pressure Mother        REVIEW OF SYSTEMS:      CONSTITUTIONAL:  Alert/Oriented without abd pain at present  DERMATOLOGICAL: positive for - Left upper high  hematoma/drainage from S/P OHS   NEUROLOGICAL:  negative  EYES:  negative  HEENT:  negative  RESPIRATORY:  negative  CARDIOVASCULAR:  negative  GASTROINTESTINAL:  negative  GENITO-URINARY: Abdominal pain  ENDOCRINE: negative  MUSCULOSKELETAL:  negative  HEMATOLOGICAL AND LYMPHATIC: negative  IMMUNOLOGICAL: negative  PSYCHOLOGICAL: negative    PHYSICAL EXAM:    VITALS:  /73   Pulse 76   Temp 96.2 °F (35.7 °C) (Temporal)   Resp 16   Ht 5' 8\" (1.727 m)   Wt 215 lb 6.4 oz (97.7 kg)   SpO2 99%   BMI 32.75 kg/m²     Eyes:  lids and lashes normal, pupils equal and round, extra ocular muscles intact, sclera clear, conjunctiva normal    Head/ENT:  Normocephalic, atraumatic, normal gums, & palate    Neck:  supple, symmetrical, trachea midline, no lymphadenopathy, no jugular venous distension, no carotid bruits and MASSES:  no masses    Lungs:  no increased work of breathing, good air exchange, no retractions and clear to auscultation, no palpable / percussible abnormalities; sternum stable to deep palpation    Cardiovascular:  regular rate and rhythm, S1, S2 normal, no murmur, click, rub or gallop    Pulses:  Right dorsalis pedis 2, Left dorsalis pedis 2, Right posterior tibial 2, Left Posterior tibial 2, Right Femoral 2, Left Femoral 2, Right radial 2, and Left radial 2    Musculoskeletal:  Back is straight and non-tender, full ROM of upper and lower extremities.     Extremities:   No clubbing, cyanosis, or edema     Skin: warm and normal turgor, no ulcers, infections, or rashes, left thigh EVH site much smaller and erythema resolved    Neurological: awake, alert and oriented x 3, motor 5/5 bilateral upper and lower extremities, sensation grossly intact    Psychiatric: Mood and affect appear appropriate    DATA:    CBC:   Lab Results   Component Value Date    WBC 8.9 10/02/2018    RBC 2.72 10/02/2018    HGB 8.2 10/02/2018    HCT 24.9 10/02/2018    MCV 91.3 10/02/2018    MCH 30.0 10/02/2018    MCHC 32.8 10/02/2018    RDW 16.4 10/02/2018     10/02/2018    MPV 8.0 10/02/2018     BMP:    Lab Results   Component Value Date     10/02/2018    K 4.3 10/02/2018    K 4.3 10/01/2018     10/02/2018    CO2 28 10/02/2018    BUN 19 10/02/2018    LABALBU 2.8 10/01/2018    CREATININE 1.3 10/02/2018    CALCIUM 9.4 10/02/2018    GFRAA 49 10/02/2018    LABGLOM 40 10/02/2018    LABGLOM 36 10/01/2018    GLUCOSE 106 10/02/2018     Hepatic Function Panel:    Lab Results   Component Value Date    ALKPHOS 89 10/01/2018

## 2018-10-02 NOTE — PROGRESS NOTES
Assessment & Plan:   ??  Colitis   Ischemic   Zosyn, r/o C diff    Splenic infarct   Get hematology on board, on anticoagulation, inr supratherpeutic   Get Echocardiogram     H/o PE  On coumadin     Recent CABG   Will consult CTVS    Afib  On dig, coumadin     H/o eosinophilic gastritis     Infected thigh hematoma   On abx     IV Access:peripheral   Henry: No  Diet: Diet NPO Effective Now  Code:Full Code  DVT PPX coumadin   Disposition unclear      Sohan Coats MD   10/2/2018 2:48 PM

## 2018-10-02 NOTE — ONCOLOGY
CONSTITUTIONAL:  awake, alert, cooperative, no apparent distress  EYES:  pupils equal, round and reactive to light, sclera clear and conjunctiva normal  ENT:  normocepalic, without obvious abnormality, atramatic  NECK:  supple, symmetrical, no jugular venous distension and no carotid bruits  HEMATOLOGIC/LYMPHATICS:  No cervical,supraclavicular or axillary lymphadenopathy  LUNGS:  No increased work of breathing and clear to auscultation  CARDIOVASCULAR: Regular rate and rhythm, normal S1 and S2, no murmur noted  ABDOMEN:  Normal bowel sounds x 4, soft, non-distended, non-tender, no masses palpated, no hepatosplenomegally  MUSCULOSKELETAL:  full range of motion noted, tone is normal  EXTREMITIES: no LE edema  NEUROLOGIC:  Awake, alert, oriented to name, place and time. Motor skills grossly intact. SKIN:  normal skin color, texture, turgor and no jaundice. Appears intact.     DATA:  General Labs:    CBC:   Recent Labs      10/01/18   0806  10/01/18   1633  10/02/18   0746   WBC  8.3  8.8  8.9   HGB  8.5*  9.2*  8.2*   HCT  27.0*  30.1*  24.9*   MCV  94.1  97.2  91.3   PLT  318  301  302     BMP:   Recent Labs      10/01/18   0806  10/01/18   1633  10/02/18   0746   NA  139  136  139   K  4.3  4.3  4.3   CL  102  100  101   CO2  26  25  28   BUN  18  18  19   CREATININE  1.44*  1.3*  1.3*     LIVER PROFILE:   Recent Labs      10/01/18   1633   AST  13*   ALT  13   LIPASE  60.0   BILITOT  0.3   ALKPHOS  89     PT/INR:    Lab Results   Component Value Date    PROTIME 45.7 10/02/2018    PROTIME 60.1 10/01/2018    PROTIME 39.5 09/04/2018    INR 4.01 10/02/2018    INR 5.27 10/01/2018    INR 8.0 10/01/2018    INR 2.90 09/24/2018     PTT:    Lab Results   Component Value Date    APTT 50.7 10/02/2018    APTT 25.7 08/26/2018    APTT 37.0 08/21/2018     Magnesium:    Lab Results   Component Value Date    MG 2.40 09/02/2018    MG 2.50 08/30/2018    MG 2.60 08/24/2018       Imaging:  Ct Abdomen Pelvis W Iv Contrast Additional

## 2018-10-02 NOTE — ONCOLOGY
cabg  · Respiratory: No cough or wheezing, no sputum production. No hemoptysis. · Gastrointestinal: severe right upper quadrant pain no  appetite loss, no  blood in stools. No change in bowel habits. · Genitourinary: No dysuria, trouble voiding, or hematuria. · Musculoskeletal:   No generalized weakness. No joint complaints. · Integumentary: No rash or pruritis. · Neurological: No headache, diplopia. No change in gait, balance, or coordination. No focal neurological deficits including weakness, numbness, or tingling. · Endocrine: No temperature intolerance. No excessive thirst, fluid intake, or urination. · Hematologic/Lymphatic: No abnormal bruising or ecchymoses, blood clots or swollen lymph nodes. Has had intermittent eosinophilia  · Allergic/Immunologic: No nasal congestion or hives. ·     PHYSICAL EXAM:    Vitals:  Vitals:    10/02/18 1716   BP: 102/66   Pulse: 76   Resp: 16   Temp: 97.8 °F (36.6 °C)   SpO2: 95%      CONSTITUTIONAL:  awake, alert, cooperative, no apparent distress  EYES:  pupils equal, round and reactive to light, sclera clear and conjunctiva normal  ENT:  normocepalic, without obvious abnormality, atramatic  NECK:  supple, symmetrical, no jugular venous distension and no carotid bruits  HEMATOLOGIC/LYMPHATICS:  No cervical,supraclavicular or axillary lymphadenopathy  LUNGS:  No increased work of breathing and clear to auscultation  CARDIOVASCULAR: Regular rate and rhythm, normal S1 and S2, no murmur noted  ABDOMEN:  Normal bowel sounds x 4, soft, distended,very tender in right upper quadrant no masses palpated, no hepatosplenomegally  MUSCULOSKELETAL:  full range of motion noted, tone is normal  EXTREMITIES: no LE edema  NEUROLOGIC:  Awake, alert, oriented to name, place and time. Motor skills grossly intact. SKIN:  normal skin color, texture, turgor and no jaundice. Appears intact.     DATA:  General Labs:    CBC:   Recent Labs      10/01/18   0806  10/01/18   1633  10/02/18   8026 effusion. No right pleural or pericardial effusion. There is moderate multichamber cardiac enlargement. Organs: The liver has normal size and contours. No suspicious intrahepatic mass lesion identified. No intra or extrahepatic biliary ductal dilatation. The gallbladder is absent. Punctate calcification within the spleen is likely on the basis of sequelae of prior granulomatous disease. There is new irregular wedge-shaped area of heterogeneous hypodensity in the superior spleen, medially. The pancreas and adrenal glands have a normal contrast-enhanced CT appearance. The kidneys are symmetric in size and contrast-enhanced CT appearance. No suspicious renal lesions identified. No obstructive uropathy. GI/bowel:   No dilated loops of bowel, or findings to suggest obstruction. There is mucosal thickening and haziness of the fat associated with the proximal transverse colon. An appendix is not confidently identified but there are no inflammatory changes identified in the right lower quadrant of the abdomen. . Peritoneum/retroperitoneum:  No lymphadenopathy, free fluid or free air identified in the abdomen or pelvis. There are moderate calcific atherosclerotic changes of the abdominal aorta without aneurysm. There is also advanced calcific atherosclerotic disease of the splenic artery. Pelvis: The uterus is reportedly surgically absent. Stable appearance of 7.8 cm fluid attenuating hypodensity with single thin septation associated with the left adnexa when compared to the CT from 07/30/2018. This lesion measured approximately 6.3 cm on the exam from 11/19/2015. .  The urinary bladder is unremarkable. Bones/soft tissues: Visualized osseous structures are mildly demineralized. There is advanced multilevel degenerative disc disease most evident at L1-L2 and L5-S1. There are hypertrophic degenerative changes of the facet joints throughout the lumbar spine. No suspicious osseous lesions are identified.      1. Mucosal thickening in haziness of the fat associated with the proximal transverse colon is most suggestive of nonspecific infectious or inflammatory colitis. 2. New irregular wedge-shaped heterogeneous hypodensity involving the superomedial spleen. Findings are concerning for splenic infarction. 3. Stable appearance of cystic lesion involving the left adnexa measuring up to 7.8 cm, which has shown interval growth over time. Given size, MRI with IV contrast or surgical evaluation is suggested. Xr Chest Portable    Result Date: 10/1/2018  EXAMINATION: SINGLE XRAY VIEW OF THE CHEST 10/1/2018 4:16 pm COMPARISON: 08/26/2018 HISTORY: ORDERING SYSTEM PROVIDED HISTORY: SOB TECHNOLOGIST PROVIDED HISTORY: Reason for exam:->SOB Ordering Physician Provided Reason for Exam: sob Acuity: Unknown Type of Exam: Unknown FINDINGS: Cardial-pericardial silhouette is enlarged but stable. No definite acute consolidation identified, but the retrocardiac area is not well evaluated. Right lung is grossly clear. No pneumothorax. No free air. No definite acute abnormality detected. Limited by patient's body habitus, portable technique, and by cardiomegaly. Assessment & Plan:  Acute right upper quadrant pain concern for ischemic colitis. Also has splenic infarct feel clinical picture consistent with embolic phenomena Patient states she was supposed to have echocardiogram today  supratherapeutic inr will continue to observe would not reverse at this time. Consider heparin or lovenox  Adnexal mass will check ca 125   Coronary artery disease and mitral valve replacement august 2018  History of eosinophilia and eosinophilic gastritis           I have discussed the above stated plan with the patient and they verbalized understanding and agreed with the plan. Thank you for allowing us to participate in this patients care.     Georgia Pinedo MD  10/2/2018, 7:28 PM

## 2018-10-02 NOTE — CONSULTS
non-distended, moderate right upper quadrant tenderness without peritonitis  SKIN:  no bruising or bleeding, normal skin color, texture, turgor and no redness, warmth, or swelling      Labs:    CBC:    Recent Labs      10/01/18   0806  10/01/18   1633  10/02/18   0746   WBC  8.3  8.8  8.9   HGB  8.5*  9.2*  8.2*   HCT  27.0*  30.1*  24.9*   PLT  318  301  302     BMP:  Recent Labs      10/01/18   0806  10/01/18   1633  10/02/18   0746   NA  139  136  139   K  4.3  4.3  4.3   CL  102  100  101   CO2  26  25  28   BUN  18  18  19   CREATININE  1.44*  1.3*  1.3*   GLUCOSE  91  167*  106*     Hepatic: Recent Labs      10/01/18   1633   AST  13*   ALT  13   BILITOT  0.3   ALKPHOS  89     Amylase: No results for input(s): AMYLASE in the last 72 hours. Lipase:   Recent Labs      10/01/18   1633   LIPASE  60.0     Mag:    No results for input(s): MG in the last 72 hours. Phos:   No results for input(s): PHOS in the last 72 hours. Coags:   Recent Labs      10/01/18   1200  10/01/18   2212  10/02/18   0746   INR  8.0  5.27*  4.01*   APTT   --    --   50.7*       Imaging:  I have personally reviewed the following films:    Ct Abdomen Pelvis W Iv Contrast Additional Contrast? None    Result Date: 10/1/2018  EXAMINATION: CT OF THE ABDOMEN AND PELVIS WITH CONTRAST 10/1/2018 6:24 pm TECHNIQUE: CT of the abdomen and pelvis was performed with the administration of intravenous contrast. Multiplanar reformatted images are provided for review. Dose modulation, iterative reconstruction, and/or weight based adjustment of the mA/kV was utilized to reduce the radiation dose to as low as reasonably achievable. COMPARISON: CT abdomen/pelvis from 07/30/2018 HISTORY: ORDERING SYSTEM PROVIDED HISTORY: right sided abd pain TECHNOLOGIST PROVIDED HISTORY: Additional Contrast?->None Ordering Physician Provided Reason for Exam: Abdominal Pain (RLQ pain onset today. recent cardiac surgery at the end of August. no abd. surgeries recently.  nausea

## 2018-10-02 NOTE — ED PROVIDER NOTES
mg by mouth 2 times daily 180 capsule 0    carvedilol (COREG) 3.125 MG tablet Take 1 tablet by mouth 2 times daily (with meals) 180 tablet 1    aspirin 325 MG EC tablet Take 1 tablet by mouth daily 90 tablet 3    oxyCODONE 5 MG capsule Take 5 mg by mouth every 4 hours as needed for Pain. [place] Digoxin 62.5 MCG TABS Take 0.0625 mg by mouth daily 30 tablet 3    Blood Glucose Monitoring Suppl (FREESTYLE LITE) GAETANO 1 Device by Does not apply route 4 times daily Patient to check blood sugar 4 times a day and PRN, she is treated with multiple daily injections of insulin that require correction dosing ;A1C 8.1 ; ICD code-E11.65 ,Z79.4 1 Device 0    blood glucose test strips (FREESTYLE LITE) strip 1 each by Does not apply route 4 times daily Patient to check blood sugar 4 times a day and PRN, she is treated with multiple daily injections of insulin that require correction dosing ;A1C 8.1 ; ICD code-E11.65 ,Z79.4 100 each 5    FREESTYLE LANCETS MISC 1 each by Does not apply route 4 times daily Patient to check blood sugar 4 times a day and PRN, she is treated with multiple daily injections of insulin that require correction dosing ;A1C 8.1 ; ICD code-E11.65 ,Z79.4 100 each 5    blood glucose test strips (ASCENSIA AUTODISC VI;ONE TOUCH ULTRA TEST VI) strip 1 each by Does not apply route 4 times daily (before meals and nightly) Patient to check blood sugar 4 times a day and PRN, she is treated with multiple daily injections of insulin that require correction dosing. LABA1C  8.1 08/16/2018 ICD code- E11.65, Z79.4 100 each 3    warfarin (COUMADIN) 5 MG tablet 5mg Mon, Wed, Fri and 7.5mg all other days (Patient taking differently: 7.5mg Mon, tues Wed,Thurs Sat and Sun.  10mg on Friday.) 130 tablet 3    montelukast (SINGULAIR) 10 MG tablet TAKE 1 TABLET NIGHTLY 90 tablet 3    insulin glargine (LANTUS) 100 UNIT/ML injection pen 60 units AM and 40 units Bedtime (Patient taking differently: Inject 10 Units into the skin 60 Abnormal; Notable for the following:        Result Value    RBC 3.10 (*)     Hemoglobin 9.2 (*)     Hematocrit 30.1 (*)     MCHC 30.5 (*)     RDW 16.9 (*)     Lymphocytes # 0.9 (*)     All other components within normal limits    Narrative:     Performed at:  [facility] [address],  [place]   Phone [phone]   COMPREHENSIVE METABOLIC PANEL W/ REFLEX TO MG FOR LOW K - Abnormal; Notable for the following:     Glucose 167 (*)     CREATININE 1.3 (*)     GFR Non- 40 (*)     GFR  49 (*)     Alb 2.8 (*)     Albumin/Globulin Ratio 0.6 (*)     AST 13 (*)     All other components within normal limits    Narrative:     Performed at:  [facility] [address],  [place]   Phone [phone]   URINE RT REFLEX TO CULTURE - Abnormal; Notable for the following:     Blood, Urine TRACE-INTACT (*)     Protein, UA 30 (*)     All other components within normal limits    Narrative:     Performed at:  [facility] [address],  [place]   Phone [phone]   TROPONIN - Abnormal; Notable for the following:     Troponin 0.03 (*)     All other components within normal limits    Narrative:     Performed at:  [facility] [address],  [place]   Phone [phone] - Abnormal; Notable for the following:     Bacteria, UA Rare (*)     All other components within normal limits    Narrative:     Performed at:  [facility] [address],  [place]   Phone [phone]   CULTURE BLOOD #1   CULTURE BLOOD #2   LIPASE    Narrative:     Performed at:  [facility] [address],  [place]   Phone [phone]   LACTIC ACID, PLASMA    Narrative:     Performed at:  [facility]  [address] [facility], [address]   Phone [phone]    Narrative:     Leyda ALBERTS tel. [phone],  Rejected Test Name/Called to:PT/ RN Fior Alexis, 10/01/2018 21:28, by Efra Cesar  Performed at:  [facility]  [address] [facility], [address]   Phone [phone]   PROTIME-INR     Previous HGB:    Hemoglobin   Date/Time Value Ref Range Status   10/01/2018 04:33 PM 9.2 (L) 12.0 - 16.0 g/dL Final   09/04/2018 04:12 AM 9.9 (L) 12.0 - 16.0 g/dL Final   09/03/2018 06:54 AM 9.4 (L) 12.0 - 16.0 g/dL Final     RADIOLOGY:   Plain x-rays were viewed by me: Ct Abdomen Pelvis W Iv Contrast Additional Contrast? None    Result Date: 10/1/2018  EXAMINATION: CT OF THE ABDOMEN AND PELVIS WITH CONTRAST 10/1/2018 6:24 pm TECHNIQUE: CT of the abdomen and pelvis was performed with the administration of intravenous contrast. Multiplanar reformatted images are provided for review. Dose modulation, iterative reconstruction, and/or weight based adjustment of the mA/kV was utilized to reduce the radiation dose to as low as reasonably achievable. COMPARISON: CT abdomen/pelvis from 07/30/2018 HISTORY: ORDERING SYSTEM PROVIDED HISTORY: right sided abd pain TECHNOLOGIST PROVIDED HISTORY: Additional Contrast?->None Ordering Physician Provided Reason for Exam: Abdominal Pain (RLQ pain onset today. recent cardiac surgery at the end of August. no abd. surgeries recently. nausea no vomiting. ) Acuity: Unknown Type of Exam: Unknown FINDINGS: Lung bases:  Visualized lung bases are well aerated without focal airspace consolidation, lung nodule or lung mass. There is a small left pleural effusion. No right pleural or pericardial effusion. There is moderate multichamber cardiac enlargement. Organs: The liver has normal size and contours. No suspicious intrahepatic mass lesion identified. No intra or extrahepatic biliary ductal dilatation. The gallbladder is absent.

## 2018-10-02 NOTE — PLAN OF CARE
Problem: Falls - Risk of:  Goal: Will remain free from falls  Will remain free from falls   Outcome: Ongoing  Pt will remain free from falls this admission. Fall precautions in place.

## 2018-10-02 NOTE — PLAN OF CARE
pelvis was performed with the administration of  intravenous contrast. Multiplanar reformatted images are provided for review. Dose modulation, iterative reconstruction, and/or weight based adjustment of  the mA/kV was utilized to reduce the radiation dose to as low as reasonably  achievable. COMPARISON:  CT abdomen/pelvis from 07/30/2018    HISTORY:  ORDERING SYSTEM PROVIDED HISTORY: right sided abd pain  TECHNOLOGIST PROVIDED HISTORY:  Additional Contrast?->None  Ordering Physician Provided Reason for Exam: Abdominal Pain (RLQ pain onset  today. recent cardiac surgery at the end of August. no abd. surgeries  recently. nausea no vomiting. )  Acuity: Unknown  Type of Exam: Unknown    FINDINGS:  Lung bases:  Visualized lung bases are well aerated without focal airspace  consolidation, lung nodule or lung mass. There is a small left pleural  effusion. No right pleural or pericardial effusion. There is moderate  multichamber cardiac enlargement. Organs: The liver has normal size and contours. No suspicious intrahepatic  mass lesion identified. No intra or extrahepatic biliary ductal dilatation. The gallbladder is absent. Punctate calcification within the spleen is likely on the basis of sequelae  of prior granulomatous disease. There is new irregular wedge-shaped area of  heterogeneous hypodensity in the superior spleen, medially. The pancreas and  adrenal glands have a normal contrast-enhanced CT appearance. The kidneys are symmetric in size and contrast-enhanced CT appearance. No  suspicious renal lesions identified. No obstructive uropathy. GI/bowel:   No dilated loops of bowel, or findings to suggest obstruction. There is mucosal thickening and haziness of the fat associated with the  proximal transverse colon. An appendix is not confidently identified but  there are no inflammatory changes identified in the right lower quadrant of  the abdomen. .    Peritoneum/retroperitoneum:  No lymphadenopathy, free fluid or free air  identified in the abdomen or pelvis. There are moderate calcific  atherosclerotic changes of the abdominal aorta without aneurysm. There is  also advanced calcific atherosclerotic disease of the splenic artery. Pelvis: The uterus is reportedly surgically absent. Stable appearance of 7.8  cm fluid attenuating hypodensity with single thin septation associated with  the left adnexa when compared to the CT from 07/30/2018. This lesion  measured approximately 6.3 cm on the exam from 11/19/2015. .  The urinary  bladder is unremarkable. Bones/soft tissues: Visualized osseous structures are mildly demineralized. There is advanced multilevel degenerative disc disease most evident at L1-L2  and L5-S1. There are hypertrophic degenerative changes of the facet joints  throughout the lumbar spine. No suspicious osseous lesions are identified. Impression: 1. Mucosal thickening in haziness of the fat associated with the proximal  transverse colon is most suggestive of nonspecific infectious or inflammatory  colitis. 2. New irregular wedge-shaped heterogeneous hypodensity involving the  superomedial spleen. Findings are concerning for splenic infarction. 3. Stable appearance of cystic lesion involving the left adnexa measuring up  to 7.8 cm, which has shown interval growth over time. Given size, MRI with  IV contrast or surgical evaluation is suggested. ASSESSMENT:  71 y.o. female admitted with   1. Abdominal pain, right upper quadrant    2. Colitis    3. Splenic infarct    4. Supratherapeutic INR    5. Elevated troponin        Right upper quadrant pain  Possible ischemic colitis, CT imaging reveals mucosal thickening fat stranding associated with the proximal transverse colon  Possible splenic infarction  CAD      PLAN:  1.  No plans for immediate surgical intervention at this time; will treat conservatively but should condition deteriorate, would likely need emergent exploration  2. NPO  3. IV hydration  4. Antibiotics; continue to closely monitor for leukocytosis, fevers  5. Activity as tolerated  6. Pulmonary toilet, incentive spirometry  7. PRN analgesics and antiemetics--minimizing narcotics as tolerated  8. DVT prophylaxis with per primary team; hold Coumadin at this time  9. Management of medical comorbid etiologies per primary team and consulting services    EDUCATION:  Educated patient on plan of care and disease process--all questions answered. Plans discussed with patient and nursing. Reviewed and discuss with Dr. Washington Katz consult to follow.        Signed:  SOLEDAD Lagos - CNP  10/2/2018 12:58 PM

## 2018-10-03 PROBLEM — E66.9 DIABETES MELLITUS TYPE 2 IN OBESE (HCC): Status: ACTIVE | Noted: 2017-04-27

## 2018-10-03 PROBLEM — E11.69 DIABETES MELLITUS TYPE 2 IN OBESE (HCC): Status: ACTIVE | Noted: 2017-04-27

## 2018-10-03 LAB
A/G RATIO: 0.7 (ref 1.1–2.2)
ALBUMIN SERPL-MCNC: 3.1 G/DL (ref 3.4–5)
ALP BLD-CCNC: 108 U/L (ref 40–129)
ALT SERPL-CCNC: 12 U/L (ref 10–40)
ANION GAP SERPL CALCULATED.3IONS-SCNC: 12 MMOL/L (ref 3–16)
APTT: 40.8 SEC (ref 26–36)
AST SERPL-CCNC: 13 U/L (ref 15–37)
BASOPHILS ABSOLUTE: 0 K/UL (ref 0–0.2)
BASOPHILS RELATIVE PERCENT: 0.4 %
BILIRUB SERPL-MCNC: 0.5 MG/DL (ref 0–1)
BUN BLDV-MCNC: 18 MG/DL (ref 7–20)
CA 125: 150.4 U/ML (ref 0–35)
CALCIUM SERPL-MCNC: 9.1 MG/DL (ref 8.3–10.6)
CHLORIDE BLD-SCNC: 100 MMOL/L (ref 99–110)
CO2: 27 MMOL/L (ref 21–32)
CREAT SERPL-MCNC: 1.5 MG/DL (ref 0.6–1.2)
CULTURE: NORMAL
EOSINOPHILS ABSOLUTE: 0.2 K/UL (ref 0–0.6)
EOSINOPHILS RELATIVE PERCENT: 2.6 %
GFR AFRICAN AMERICAN: 41
GFR NON-AFRICAN AMERICAN: 34
GLOBULIN: 4.5 G/DL
GLUCOSE BLD-MCNC: 79 MG/DL (ref 70–99)
GLUCOSE BLD-MCNC: 87 MG/DL (ref 70–99)
GLUCOSE BLD-MCNC: 92 MG/DL (ref 70–99)
HCT VFR BLD CALC: 25.6 % (ref 36–48)
HEMOGLOBIN: 8.5 G/DL (ref 12–16)
INR BLD: 2.49 (ref 0.86–1.14)
LACTIC ACID: 1 MMOL/L (ref 0.4–2)
LEFT VENTRICULAR EJECTION FRACTION HIGH VALUE: 50 %
LEFT VENTRICULAR EJECTION FRACTION MODE: NORMAL
LV EF: 50 %
LVEF MODALITY: NORMAL
LYMPHOCYTES ABSOLUTE: 1.2 K/UL (ref 1–5.1)
LYMPHOCYTES RELATIVE PERCENT: 14.3 %
MAGNESIUM: 2.5 MG/DL (ref 1.8–2.4)
MCH RBC QN AUTO: 30.5 PG (ref 26–34)
MCHC RBC AUTO-ENTMCNC: 33.3 G/DL (ref 31–36)
MCV RBC AUTO: 91.4 FL (ref 80–100)
MONOCYTES ABSOLUTE: 0.8 K/UL (ref 0–1.3)
MONOCYTES RELATIVE PERCENT: 9 %
NEUTROPHILS ABSOLUTE: 6.3 K/UL (ref 1.7–7.7)
NEUTROPHILS RELATIVE PERCENT: 73.7 %
PDW BLD-RTO: 16 % (ref 12.4–15.4)
PERFORMED ON: NORMAL
PERFORMED ON: NORMAL
PHOSPHORUS: 4.3 MG/DL (ref 2.5–4.9)
PLATELET # BLD: 306 K/UL (ref 135–450)
PMV BLD AUTO: 7.8 FL (ref 5–10.5)
POTASSIUM SERPL-SCNC: 4.1 MMOL/L (ref 3.5–5.1)
PREALBUMIN: 12.8 MG/DL (ref 20–40)
PROTHROMBIN TIME: 28.4 SEC (ref 9.8–13)
RBC # BLD: 2.8 M/UL (ref 4–5.2)
SODIUM BLD-SCNC: 139 MMOL/L (ref 136–145)
TOTAL PROTEIN: 7.6 G/DL (ref 6.4–8.2)
TRANSFERRIN: 169 MG/DL (ref 200–360)
URINE CULTURE, ROUTINE: NORMAL
WBC # BLD: 8.5 K/UL (ref 4–11)

## 2018-10-03 PROCEDURE — 80053 COMPREHEN METABOLIC PANEL: CPT

## 2018-10-03 PROCEDURE — 6360000002 HC RX W HCPCS: Performed by: INTERNAL MEDICINE

## 2018-10-03 PROCEDURE — 2500000003 HC RX 250 WO HCPCS: Performed by: SURGERY

## 2018-10-03 PROCEDURE — 84100 ASSAY OF PHOSPHORUS: CPT

## 2018-10-03 PROCEDURE — 36415 COLL VENOUS BLD VENIPUNCTURE: CPT

## 2018-10-03 PROCEDURE — 84466 ASSAY OF TRANSFERRIN: CPT

## 2018-10-03 PROCEDURE — 6370000000 HC RX 637 (ALT 250 FOR IP): Performed by: INTERNAL MEDICINE

## 2018-10-03 PROCEDURE — 99223 1ST HOSP IP/OBS HIGH 75: CPT | Performed by: INTERNAL MEDICINE

## 2018-10-03 PROCEDURE — 83735 ASSAY OF MAGNESIUM: CPT

## 2018-10-03 PROCEDURE — 6360000004 HC RX CONTRAST MEDICATION: Performed by: INTERNAL MEDICINE

## 2018-10-03 PROCEDURE — 93306 TTE W/DOPPLER COMPLETE: CPT

## 2018-10-03 PROCEDURE — 83605 ASSAY OF LACTIC ACID: CPT

## 2018-10-03 PROCEDURE — 2060000000 HC ICU INTERMEDIATE R&B

## 2018-10-03 PROCEDURE — 85730 THROMBOPLASTIN TIME PARTIAL: CPT

## 2018-10-03 PROCEDURE — 2580000003 HC RX 258: Performed by: INTERNAL MEDICINE

## 2018-10-03 PROCEDURE — 85025 COMPLETE CBC W/AUTO DIFF WBC: CPT

## 2018-10-03 PROCEDURE — 6370000000 HC RX 637 (ALT 250 FOR IP): Performed by: NURSE PRACTITIONER

## 2018-10-03 PROCEDURE — 99231 SBSQ HOSP IP/OBS SF/LOW 25: CPT | Performed by: SURGERY

## 2018-10-03 PROCEDURE — APPNB30 APP NON BILLABLE TIME 0-30 MINS: Performed by: NURSE PRACTITIONER

## 2018-10-03 PROCEDURE — 87040 BLOOD CULTURE FOR BACTERIA: CPT

## 2018-10-03 PROCEDURE — 86304 IMMUNOASSAY TUMOR CA 125: CPT

## 2018-10-03 PROCEDURE — 84134 ASSAY OF PREALBUMIN: CPT

## 2018-10-03 PROCEDURE — 85610 PROTHROMBIN TIME: CPT

## 2018-10-03 PROCEDURE — APPSS15 APP SPLIT SHARED TIME 0-15 MINUTES: Performed by: NURSE PRACTITIONER

## 2018-10-03 RX ORDER — SODIUM CHLORIDE, SODIUM LACTATE, POTASSIUM CHLORIDE, CALCIUM CHLORIDE 600; 310; 30; 20 MG/100ML; MG/100ML; MG/100ML; MG/100ML
INJECTION, SOLUTION INTRAVENOUS CONTINUOUS
Status: DISCONTINUED | OUTPATIENT
Start: 2018-10-03 | End: 2018-10-03

## 2018-10-03 RX ORDER — SODIUM CHLORIDE 9 MG/ML
INJECTION, SOLUTION INTRAVENOUS CONTINUOUS
Status: DISCONTINUED | OUTPATIENT
Start: 2018-10-03 | End: 2018-10-05

## 2018-10-03 RX ORDER — MAGNESIUM SULFATE IN WATER 40 MG/ML
2 INJECTION, SOLUTION INTRAVENOUS ONCE
Status: DISCONTINUED | OUTPATIENT
Start: 2018-10-03 | End: 2018-10-03

## 2018-10-03 RX ORDER — WARFARIN SODIUM 5 MG/1
5 TABLET ORAL DAILY
Status: DISCONTINUED | OUTPATIENT
Start: 2018-10-03 | End: 2018-10-03

## 2018-10-03 RX ADMIN — MONTELUKAST SODIUM 10 MG: 10 TABLET, FILM COATED ORAL at 20:56

## 2018-10-03 RX ADMIN — TAZOBACTAM SODIUM AND PIPERACILLIN SODIUM 3.38 G: 375; 3 INJECTION, SOLUTION INTRAVENOUS at 23:07

## 2018-10-03 RX ADMIN — CARVEDILOL 3.12 MG: 3.12 TABLET, FILM COATED ORAL at 16:59

## 2018-10-03 RX ADMIN — NYSTATIN 500000 UNITS: 100000 SUSPENSION ORAL at 20:56

## 2018-10-03 RX ADMIN — ASPIRIN 325 MG: 325 TABLET, DELAYED RELEASE ORAL at 09:23

## 2018-10-03 RX ADMIN — PRAVASTATIN SODIUM 40 MG: 40 TABLET ORAL at 20:56

## 2018-10-03 RX ADMIN — ONDANSETRON 4 MG: 2 INJECTION INTRAMUSCULAR; INTRAVENOUS at 23:06

## 2018-10-03 RX ADMIN — HUMAN ALBUMIN MICROSPHERES AND PERFLUTREN 0.66 MG: 10; .22 INJECTION, SOLUTION INTRAVENOUS at 09:00

## 2018-10-03 RX ADMIN — TAZOBACTAM SODIUM AND PIPERACILLIN SODIUM 3.38 G: 375; 3 INJECTION, SOLUTION INTRAVENOUS at 00:40

## 2018-10-03 RX ADMIN — HYDROMORPHONE HYDROCHLORIDE 1 MG: 2 INJECTION INTRAMUSCULAR; INTRAVENOUS; SUBCUTANEOUS at 23:06

## 2018-10-03 RX ADMIN — CARVEDILOL 3.12 MG: 3.12 TABLET, FILM COATED ORAL at 09:23

## 2018-10-03 RX ADMIN — Medication 10 ML: at 09:23

## 2018-10-03 RX ADMIN — TAZOBACTAM SODIUM AND PIPERACILLIN SODIUM 3.38 G: 375; 3 INJECTION, SOLUTION INTRAVENOUS at 09:23

## 2018-10-03 RX ADMIN — TAZOBACTAM SODIUM AND PIPERACILLIN SODIUM 3.38 G: 375; 3 INJECTION, SOLUTION INTRAVENOUS at 16:59

## 2018-10-03 RX ADMIN — SODIUM CHLORIDE: 9 INJECTION, SOLUTION INTRAVENOUS at 15:50

## 2018-10-03 RX ADMIN — DIGOXIN 0.06 MG: 125 TABLET ORAL at 09:23

## 2018-10-03 RX ADMIN — HYDROMORPHONE HYDROCHLORIDE 1 MG: 2 INJECTION INTRAMUSCULAR; INTRAVENOUS; SUBCUTANEOUS at 00:28

## 2018-10-03 ASSESSMENT — PAIN SCALES - GENERAL
PAINLEVEL_OUTOF10: 7
PAINLEVEL_OUTOF10: 5
PAINLEVEL_OUTOF10: 0
PAINLEVEL_OUTOF10: 4
PAINLEVEL_OUTOF10: 0
PAINLEVEL_OUTOF10: 7
PAINLEVEL_OUTOF10: 5

## 2018-10-03 ASSESSMENT — ENCOUNTER SYMPTOMS
CONSTIPATION: 0
DIARRHEA: 0
SPUTUM PRODUCTION: 0
EYE REDNESS: 0
SORE THROAT: 0
BACK PAIN: 0
DOUBLE VISION: 0
COUGH: 0
EYE DISCHARGE: 0
WHEEZING: 0
BLURRED VISION: 0
ABDOMINAL PAIN: 1
HEMOPTYSIS: 0
SHORTNESS OF BREATH: 0
NAUSEA: 0

## 2018-10-03 ASSESSMENT — PAIN DESCRIPTION - ORIENTATION
ORIENTATION: RIGHT;UPPER
ORIENTATION: RIGHT;UPPER
ORIENTATION: RIGHT
ORIENTATION: RIGHT;UPPER

## 2018-10-03 ASSESSMENT — PAIN DESCRIPTION - DESCRIPTORS
DESCRIPTORS: DULL;SHARP
DESCRIPTORS: DULL
DESCRIPTORS: DULL
DESCRIPTORS: ACHING
DESCRIPTORS: DULL

## 2018-10-03 ASSESSMENT — PAIN DESCRIPTION - LOCATION
LOCATION: ABDOMEN

## 2018-10-03 ASSESSMENT — PAIN DESCRIPTION - PAIN TYPE
TYPE: ACUTE PAIN
TYPE: ACUTE PAIN

## 2018-10-03 NOTE — PROGRESS NOTES
[facility]        Progress Note    Patient Name: Nadeem Arrington  MRN: [id number]  YOB: 1945  Date of Evaluation: 10/3/2018    Chief Complaint: Abdominal pain    Subjective:  No acute events overnight  Up to chair at this time, reports that she just returned from her echocardiogram  Slightly less pain today, but still feels bloated  Denies nausea/vomiting, flatus or BM      Vital Signs:  Patient Vitals for the past 24 hrs:   BP Temp Temp src Pulse Resp SpO2 Weight   10/03/18 0745 139/70 97.5 °F (36.4 °C) Temporal 85 18 93 % -   10/03/18 0725 - - - - - - 214 lb 4.8 oz (97.2 kg)   10/03/18 0514 (!) 122/52 97.8 °F (36.6 °C) Temporal 81 16 92 % -   10/03/18 0028 121/81 97.8 °F (36.6 °C) Temporal 80 16 98 % -   10/02/18 1957 104/65 96.6 °F (35.9 °C) Temporal 83 16 92 % -   10/02/18 1716 102/66 97.8 °F (36.6 °C) Temporal 76 16 95 % -   10/02/18 1400 119/73 96.2 °F (35.7 °C) Temporal 76 16 99 % -      TEMPERATURE HISTORY 24H: Temp (24hrs), Av.3 °F (36.3 °C), Min:96.2 °F (35.7 °C), Max:97.8 °F (36.6 °C)    BLOOD PRESSURE HISTORY: Systolic (57VDR), HYH:718 , Min:102 , BJW:303    Diastolic (73HOZ), DNR:44, Min:52, Max:93      Intake/Output:  No intake/output data recorded. No intake/output data recorded.   Drain/tube Output:       Physical Exam:  General: awake, alert, oriented to  person, place, time  Lungs: clear to auscultation  Abdomen: soft, moderately tender with light palpation in the epigastric and RUQ regions, mildly distended, active bowel sounds  SKIN:  no bruising or bleeding and normal skin color, texture, turgor      Labs:  CBC:    Recent Labs      10/01/18   1633  10/02/18   0746  10/03/18   0440   WBC  8.8  8.9  8.5   HGB  9.2*  8.2*  8.5*   HCT  30.1*  24.9*  25.6*   PLT  301  302  306     BMP:  Recent Labs      10/01/18   1633  10/02/18   0746  10/03/18   0440   NA  136  139  139   K  4.3  4.3  4.1   CL  100  101  100   CO2  25  28  27   BUN  18 19 18   CREATININE  1.3*  1.3*  1.5*   GLUCOSE  167*  106*  79     Hepatic: Recent Labs      10/01/18   1633  10/03/18   0440   AST  13*  13*   ALT  13  12   BILITOT  0.3  0.5   ALKPHOS  89  108     Amylase: No results for input(s): AMYLASE in the last 72 hours. Lipase:   Recent Labs      10/01/18   1633   LIPASE  60.0     Mag:      Recent Labs      10/03/18   0440   MG  2.50*      Phos:     Recent Labs      10/03/18   0440   PHOS  4.3      Coags:   Recent Labs      10/01/18   2212  10/02/18   0746  10/03/18   0441   INR  5.27*  4.01*  2.49*   APTT   --   50.7*  40.8*       Cultures:  Anaerobic culture  No results for input(s): LABANAE in the last 72 hours. Blood culture  Recent Labs      10/01/18   2055   BC  Gram stain Anaerobic bottle:  Gram positive cocci in clusters  resembling Staphylococcus  Information to follow  *       Blood culture 2  Recent Labs      10/01/18   2055   BLOODCULT2  Gram stain Aerobic bottle:  Gram positive cocci in clusters  resembling Staphylococcus  Information to follow  *  See additional report for complete BCID panel. Body fluid culture  Recent Labs      10/01/18   2055   BLOODCULT2  Gram stain Aerobic bottle:  Gram positive cocci in clusters  resembling Staphylococcus  Information to follow  *  See additional report for complete BCID panel. Surgical culture  No results for input(s): CXSURG in the last 72 hours. Fecal occult  No results for input(s): OCCULTBLDFEC in the last 72 hours. Gram stain  No results for input(s): LABGRAM in the last 72 hours. Stool culture 1  No results for input(s): CXST in the last 72 hours. Stool culture 2  No results for input(s): STOOLCULT2 in the last 72 hours. Urine culture  No results for input(s): LABURIN in the last 72 hours. Wound abscess  No results for input(s): WNDABS in the last 72 hours.     Pathology:  NA    Imaging:  I have personally reviewed the following films:    Ct Abdomen Pelvis W Iv Contrast Additional Contrast? None    Result Date: 10/1/2018  EXAMINATION: CT OF THE ABDOMEN AND PELVIS WITH CONTRAST 10/1/2018 6:24 pm TECHNIQUE: CT of the abdomen and pelvis was performed with the administration of intravenous contrast. Multiplanar reformatted images are provided for review. Dose modulation, iterative reconstruction, and/or weight based adjustment of the mA/kV was utilized to reduce the radiation dose to as low as reasonably achievable. COMPARISON: CT abdomen/pelvis from 07/30/2018 HISTORY: ORDERING SYSTEM PROVIDED HISTORY: right sided abd pain TECHNOLOGIST PROVIDED HISTORY: Additional Contrast?->None Ordering Physician Provided Reason for Exam: Abdominal Pain (RLQ pain onset today. recent cardiac surgery at the end of August. no abd. surgeries recently. nausea no vomiting. ) Acuity: Unknown Type of Exam: Unknown FINDINGS: Lung bases:  Visualized lung bases are well aerated without focal airspace consolidation, lung nodule or lung mass. There is a small left pleural effusion. No right pleural or pericardial effusion. There is moderate multichamber cardiac enlargement. Organs: The liver has normal size and contours. No suspicious intrahepatic mass lesion identified. No intra or extrahepatic biliary ductal dilatation. The gallbladder is absent. Punctate calcification within the spleen is likely on the basis of sequelae of prior granulomatous disease. There is new irregular wedge-shaped area of heterogeneous hypodensity in the superior spleen, medially. The pancreas and adrenal glands have a normal contrast-enhanced CT appearance. The kidneys are symmetric in size and contrast-enhanced CT appearance. No suspicious renal lesions identified. No obstructive uropathy. GI/bowel:   No dilated loops of bowel, or findings to suggest obstruction. There is mucosal thickening and haziness of the fat associated with the proximal transverse colon.   An appendix is not confidently identified but there are no

## 2018-10-03 NOTE — PLAN OF CARE
Problem: Falls - Risk of:  Goal: Will remain free from falls  Will remain free from falls   Outcome: Ongoing  Pt remains free from falls. Safety precautions in place. Bed in lowest position, bed wheels locked, medium fall risk, alert and oriented, able to call for assistance as needed, call light with in reach, yellow blanket in place, fall risk wrist band on, SAFE outside of doorway. Will continue to monitor    Problem: Pain:  Goal: Pain level will decrease  Pain level will decrease   Outcome: Ongoing  Pt reports 1mg dilaudid is effective at relieving abdominal pain, however pt requests medication be given very slowly to prevent dizziness/side effects. Problem: Skin Integrity:  Goal: Skin integrity will stabilize  Skin integrity will stabilize   Pt turning self appropriately, family at beside to assist, dressing to left groin to be changed in AM, no new observed signs of skin integrity impairment.

## 2018-10-03 NOTE — CONSULTS
aspirin  325 mg Oral Daily    carvedilol  3.125 mg Oral BID WC    digoxin  0.0625 mg Oral Daily    pravastatin  40 mg Oral Nightly    sodium chloride flush  10 mL Intravenous 2 times per day    montelukast  10 mg Oral Nightly    piperacillin-tazobactam  3.375 g Intravenous Q8H    ondansetron  4 mg Intravenous Once       Current antibiotics: All antibiotics and their doses were reviewed by me    Recent Abx Admin                   piperacillin-tazobactam (ZOSYN) 3.375 g in dextrose 50 mL IVPB extended infusion (premix) (g) 3.375 g New Bag 10/03/18 0923     3.375 g New Bag  0040     3.375 g New Bag 10/02/18 1728                   REVIEW OF SYSTEMS:       Review of Systems   Constitutional: Positive for malaise/fatigue. Negative for chills, diaphoresis and fever. HENT: Negative for ear discharge, ear pain and sore throat. Eyes: Negative for blurred vision, double vision, discharge and redness. Respiratory: Negative for cough, hemoptysis, sputum production, shortness of breath and wheezing. Cardiovascular: Negative for chest pain and leg swelling. Gastrointestinal: Positive for abdominal pain (ruq area). Negative for constipation, diarrhea and nausea. Genitourinary: Negative for dysuria, flank pain, frequency, hematuria and urgency. Musculoskeletal: Negative for back pain, joint pain and myalgias. Skin: Negative for itching and rash. Neurological: Negative for dizziness, tingling, speech change, focal weakness, seizures and headaches. Endo/Heme/Allergies: Does not bruise/bleed easily. Psychiatric/Behavioral: Negative for depression and hallucinations. All other systems reviewed and are negative.         Objective:       PHYSICAL EXAM:      Vitals:   Vitals:    10/03/18 0514 10/03/18 0725 10/03/18 0745 10/03/18 1145   BP: (!) 122/52  139/70 117/67   Pulse: 81  85 78   Resp: 16  18 16   Temp: 97.8 °F (36.6 °C)  97.5 °F (36.4 °C) 97.8 °F (36.6 °C)   TempSrc: Temporal  Temporal Temporal SpO2: 92%  93% 92%   Weight:  214 lb 4.8 oz (97.2 kg)     Height:           Physical Exam   Constitutional: She is oriented to person, place, and time. She appears well-developed. The patient was seen earlier today. HENT:   Head: Normocephalic and atraumatic. Mouth/Throat: Oropharynx is clear and moist. No oropharyngeal exudate. Eyes: Pupils are equal, round, and reactive to light. Conjunctivae and EOM are normal. Right eye exhibits no discharge. Left eye exhibits no discharge. No scleral icterus. Neck: Normal range of motion. Neck supple. No thyromegaly present. Cardiovascular: Normal rate, regular rhythm and normal heart sounds. Exam reveals no friction rub. No murmur heard. Pulmonary/Chest: No stridor. No respiratory distress. She has no wheezes. She has no rales. Abdominal: Soft. Bowel sounds are normal. There is tenderness (right upper quadrant). There is no rebound and no guarding. Musculoskeletal: Normal range of motion. She exhibits no edema or tenderness. Lymphadenopathy:     She has no cervical adenopathy. She has no axillary adenopathy. Neurological: She is alert and oriented to person, place, and time. She exhibits normal muscle tone. Skin: Skin is warm and dry. No rash noted. She is not diaphoretic. No erythema. Psychiatric: She has a normal mood and affect. Nursing note and vitals reviewed. Lines: All vascular access sites are healthy with no local erythema, discharge or tenderness. Intake and output:     No intake/output data recorded. Lab Data:   All available labs were reviewed by me today.      CBC:   Recent Labs      10/01/18   1633  10/02/18   0746  10/03/18   0440   WBC  8.8  8.9  8.5   RBC  3.10*  2.72*  2.80*   HGB  9.2*  8.2*  8.5*   HCT  30.1*  24.9*  25.6*   PLT  301  302  306   MCV  97.2  91.3  91.4   MCH  29.6  30.0  30.5   MCHC  30.5*  32.8  33.3   RDW  16.9*  16.4*  16.0*        BMP:  Recent Labs      10/01/18   1633  10/02/18   0746

## 2018-10-03 NOTE — PROGRESS NOTES
[facility]   Progress Note      10/3/2018 3:02 PM        Name: Lu Jarvis . Admitted: 10/1/2018    SUBJECTIVE:  Patient feeling okay. Still with RUQ abdominal pain. No external bleeding.      Reviewed interval ancillary notes    Current Medications    warfarin (COUMADIN) daily dosing (placeholder) RX Placeholder   vancomycin (VANCOCIN) 1,500 mg in dextrose 5 % 250 mL IVPB Q24H   0.9 % sodium chloride infusion Continuous   aspirin EC tablet 325 mg Daily   carvedilol (COREG) tablet 3.125 mg BID WC   digoxin (LANOXIN) tablet 0.0625 mg Daily   pravastatin (PRAVACHOL) tablet 40 mg Nightly   sodium chloride flush 0.9 % injection 10 mL 2 times per day   sodium chloride flush 0.9 % injection 10 mL PRN   ondansetron (ZOFRAN) injection 4 mg Q6H PRN   potassium chloride 10 mEq/100 mL IVPB (Peripheral Line) PRN   magnesium sulfate 1 g in dextrose 5% 100 mL IVPB PRN   glucose (GLUTOSE) 40 % oral gel 15 g PRN   dextrose 50 % solution 12.5 g PRN   glucagon (rDNA) injection 1 mg PRN   dextrose 5 % solution PRN   montelukast (SINGULAIR) tablet 10 mg Nightly   HYDROmorphone (DILAUDID) injection 1 mg Q4H PRN   piperacillin-tazobactam (ZOSYN) 3.375 g in dextrose 50 mL IVPB extended infusion (premix) Q8H   ondansetron (ZOFRAN) injection 4 mg Once       Objective:  /77   Pulse 81   Temp 97.6 °F (36.4 °C) (Temporal)   Resp 16   Ht 5' 8\" (1.727 m)   Wt 214 lb 4.8 oz (97.2 kg)   SpO2 91%   BMI 32.58 kg/m²   No intake or output data in the 24 hours ending 10/03/18 1502 Wt Readings from Last 3 Encounters:   10/03/18 214 lb 4.8 oz (97.2 kg)   09/27/18 213 lb (96.6 kg)   09/25/18 212 lb (96.2 kg)       CONSTITUTIONAL:  awake, alert, cooperative, no apparent distress  EYES:  pupils equal, round and reactive to light, sclera clear and conjunctiva normal  ENT:  normocepalic, without obvious abnormality, atramatic  NECK:  supple, symmetrical, no jugular venous distension and no carotid

## 2018-10-04 PROBLEM — Z71.3 WEIGHT LOSS COUNSELING, ENCOUNTER FOR: Status: ACTIVE | Noted: 2018-08-16

## 2018-10-04 LAB
ANION GAP SERPL CALCULATED.3IONS-SCNC: 12 MMOL/L (ref 3–16)
APTT: 31.8 SEC (ref 26–36)
APTT: 75.5 SEC (ref 26–36)
BASOPHILS ABSOLUTE: 0 K/UL (ref 0–0.2)
BASOPHILS RELATIVE PERCENT: 0.2 %
BUN BLDV-MCNC: 17 MG/DL (ref 7–20)
CALCIUM SERPL-MCNC: 8.7 MG/DL (ref 8.3–10.6)
CHLORIDE BLD-SCNC: 101 MMOL/L (ref 99–110)
CO2: 26 MMOL/L (ref 21–32)
CREAT SERPL-MCNC: 1.5 MG/DL (ref 0.6–1.2)
EOSINOPHILS ABSOLUTE: 0.2 K/UL (ref 0–0.6)
EOSINOPHILS RELATIVE PERCENT: 2.6 %
GFR AFRICAN AMERICAN: 41
GFR NON-AFRICAN AMERICAN: 34
GLUCOSE BLD-MCNC: 188 MG/DL (ref 70–99)
GLUCOSE BLD-MCNC: 191 MG/DL (ref 70–99)
GLUCOSE BLD-MCNC: 217 MG/DL (ref 70–99)
GLUCOSE BLD-MCNC: 78 MG/DL (ref 70–99)
GLUCOSE BLD-MCNC: 92 MG/DL (ref 70–99)
GLUCOSE BLD-MCNC: 93 MG/DL (ref 70–99)
HCT VFR BLD CALC: 22.8 % (ref 36–48)
HCT VFR BLD CALC: 23.6 % (ref 36–48)
HEMOGLOBIN: 7.6 G/DL (ref 12–16)
HEMOGLOBIN: 7.7 G/DL (ref 12–16)
INR BLD: 1.71 (ref 0.86–1.14)
LACTIC ACID: 0.8 MMOL/L (ref 0.4–2)
LYMPHOCYTES ABSOLUTE: 0.9 K/UL (ref 1–5.1)
LYMPHOCYTES RELATIVE PERCENT: 11.6 %
MAGNESIUM: 2.4 MG/DL (ref 1.8–2.4)
MCH RBC QN AUTO: 29.8 PG (ref 26–34)
MCH RBC QN AUTO: 30.1 PG (ref 26–34)
MCHC RBC AUTO-ENTMCNC: 32.8 G/DL (ref 31–36)
MCHC RBC AUTO-ENTMCNC: 33.2 G/DL (ref 31–36)
MCV RBC AUTO: 90.6 FL (ref 80–100)
MCV RBC AUTO: 90.9 FL (ref 80–100)
MONOCYTES ABSOLUTE: 0.8 K/UL (ref 0–1.3)
MONOCYTES RELATIVE PERCENT: 10.2 %
NEUTROPHILS ABSOLUTE: 5.9 K/UL (ref 1.7–7.7)
NEUTROPHILS RELATIVE PERCENT: 75.4 %
PDW BLD-RTO: 15.6 % (ref 12.4–15.4)
PDW BLD-RTO: 16 % (ref 12.4–15.4)
PERFORMED ON: ABNORMAL
PERFORMED ON: NORMAL
PERFORMED ON: NORMAL
PHOSPHORUS: 3.6 MG/DL (ref 2.5–4.9)
PLATELET # BLD: 278 K/UL (ref 135–450)
PLATELET # BLD: 283 K/UL (ref 135–450)
PMV BLD AUTO: 7.9 FL (ref 5–10.5)
PMV BLD AUTO: 8.1 FL (ref 5–10.5)
POTASSIUM SERPL-SCNC: 3.7 MMOL/L (ref 3.5–5.1)
PROTHROMBIN TIME: 19.5 SEC (ref 9.8–13)
RBC # BLD: 2.52 M/UL (ref 4–5.2)
RBC # BLD: 2.6 M/UL (ref 4–5.2)
SODIUM BLD-SCNC: 139 MMOL/L (ref 136–145)
WBC # BLD: 7.3 K/UL (ref 4–11)
WBC # BLD: 7.8 K/UL (ref 4–11)

## 2018-10-04 PROCEDURE — 6370000000 HC RX 637 (ALT 250 FOR IP): Performed by: INTERNAL MEDICINE

## 2018-10-04 PROCEDURE — 6370000000 HC RX 637 (ALT 250 FOR IP): Performed by: SURGERY

## 2018-10-04 PROCEDURE — 83735 ASSAY OF MAGNESIUM: CPT

## 2018-10-04 PROCEDURE — 6370000000 HC RX 637 (ALT 250 FOR IP): Performed by: NURSE PRACTITIONER

## 2018-10-04 PROCEDURE — 2500000003 HC RX 250 WO HCPCS: Performed by: SURGERY

## 2018-10-04 PROCEDURE — 6360000002 HC RX W HCPCS: Performed by: INTERNAL MEDICINE

## 2018-10-04 PROCEDURE — 2060000000 HC ICU INTERMEDIATE R&B

## 2018-10-04 PROCEDURE — 99231 SBSQ HOSP IP/OBS SF/LOW 25: CPT | Performed by: SURGERY

## 2018-10-04 PROCEDURE — 85610 PROTHROMBIN TIME: CPT

## 2018-10-04 PROCEDURE — 85025 COMPLETE CBC W/AUTO DIFF WBC: CPT

## 2018-10-04 PROCEDURE — 85027 COMPLETE CBC AUTOMATED: CPT

## 2018-10-04 PROCEDURE — 2580000003 HC RX 258: Performed by: INTERNAL MEDICINE

## 2018-10-04 PROCEDURE — 99222 1ST HOSP IP/OBS MODERATE 55: CPT | Performed by: INTERNAL MEDICINE

## 2018-10-04 PROCEDURE — 80048 BASIC METABOLIC PNL TOTAL CA: CPT

## 2018-10-04 PROCEDURE — 85730 THROMBOPLASTIN TIME PARTIAL: CPT

## 2018-10-04 PROCEDURE — 84100 ASSAY OF PHOSPHORUS: CPT

## 2018-10-04 PROCEDURE — 36415 COLL VENOUS BLD VENIPUNCTURE: CPT

## 2018-10-04 PROCEDURE — 83605 ASSAY OF LACTIC ACID: CPT

## 2018-10-04 PROCEDURE — 99233 SBSQ HOSP IP/OBS HIGH 50: CPT | Performed by: INTERNAL MEDICINE

## 2018-10-04 RX ORDER — HEPARIN SODIUM 1000 [USP'U]/ML
80 INJECTION, SOLUTION INTRAVENOUS; SUBCUTANEOUS PRN
Status: DISCONTINUED | OUTPATIENT
Start: 2018-10-04 | End: 2018-10-04 | Stop reason: ALTCHOICE

## 2018-10-04 RX ORDER — HEPARIN SODIUM 1000 [USP'U]/ML
40 INJECTION, SOLUTION INTRAVENOUS; SUBCUTANEOUS PRN
Status: DISCONTINUED | OUTPATIENT
Start: 2018-10-04 | End: 2018-10-04 | Stop reason: ALTCHOICE

## 2018-10-04 RX ORDER — LACTOBACILLUS RHAMNOSUS GG 10B CELL
1 CAPSULE ORAL 2 TIMES DAILY WITH MEALS
Status: DISCONTINUED | OUTPATIENT
Start: 2018-10-04 | End: 2018-10-12 | Stop reason: HOSPADM

## 2018-10-04 RX ORDER — OXYCODONE HYDROCHLORIDE 5 MG/1
5 TABLET ORAL EVERY 4 HOURS PRN
Status: DISCONTINUED | OUTPATIENT
Start: 2018-10-04 | End: 2018-10-12 | Stop reason: HOSPADM

## 2018-10-04 RX ORDER — HEPARIN SODIUM 10000 [USP'U]/100ML
18 INJECTION, SOLUTION INTRAVENOUS CONTINUOUS
Status: DISCONTINUED | OUTPATIENT
Start: 2018-10-04 | End: 2018-10-07

## 2018-10-04 RX ORDER — ACETAMINOPHEN 325 MG/1
650 TABLET ORAL EVERY 4 HOURS PRN
Status: DISCONTINUED | OUTPATIENT
Start: 2018-10-04 | End: 2018-10-12 | Stop reason: HOSPADM

## 2018-10-04 RX ORDER — AMOXICILLIN AND CLAVULANATE POTASSIUM 875; 125 MG/1; MG/1
1 TABLET, FILM COATED ORAL EVERY 12 HOURS SCHEDULED
Status: DISCONTINUED | OUTPATIENT
Start: 2018-10-04 | End: 2018-10-12 | Stop reason: HOSPADM

## 2018-10-04 RX ORDER — OXYCODONE HYDROCHLORIDE 5 MG/1
10 TABLET ORAL EVERY 4 HOURS PRN
Status: DISCONTINUED | OUTPATIENT
Start: 2018-10-04 | End: 2018-10-07

## 2018-10-04 RX ORDER — HEPARIN SODIUM 1000 [USP'U]/ML
80 INJECTION, SOLUTION INTRAVENOUS; SUBCUTANEOUS ONCE
Status: COMPLETED | OUTPATIENT
Start: 2018-10-04 | End: 2018-10-04

## 2018-10-04 RX ADMIN — INSULIN LISPRO 1 UNITS: 100 INJECTION, SOLUTION INTRAVENOUS; SUBCUTANEOUS at 23:05

## 2018-10-04 RX ADMIN — HEPARIN SODIUM 7700 UNITS: 1000 INJECTION INTRAVENOUS; SUBCUTANEOUS at 12:52

## 2018-10-04 RX ADMIN — ASPIRIN 325 MG: 325 TABLET, DELAYED RELEASE ORAL at 08:21

## 2018-10-04 RX ADMIN — INSULIN LISPRO 1 UNITS: 100 INJECTION, SOLUTION INTRAVENOUS; SUBCUTANEOUS at 18:15

## 2018-10-04 RX ADMIN — HYDROMORPHONE HYDROCHLORIDE 1 MG: 2 INJECTION INTRAMUSCULAR; INTRAVENOUS; SUBCUTANEOUS at 03:05

## 2018-10-04 RX ADMIN — NYSTATIN 500000 UNITS: 100000 SUSPENSION ORAL at 17:20

## 2018-10-04 RX ADMIN — DIGOXIN 0.06 MG: 125 TABLET ORAL at 08:22

## 2018-10-04 RX ADMIN — NYSTATIN 500000 UNITS: 100000 SUSPENSION ORAL at 20:53

## 2018-10-04 RX ADMIN — MONTELUKAST SODIUM 10 MG: 10 TABLET, FILM COATED ORAL at 20:53

## 2018-10-04 RX ADMIN — Medication 1 CAPSULE: at 18:13

## 2018-10-04 RX ADMIN — ONDANSETRON 4 MG: 2 INJECTION INTRAMUSCULAR; INTRAVENOUS at 08:24

## 2018-10-04 RX ADMIN — NYSTATIN 500000 UNITS: 100000 SUSPENSION ORAL at 08:23

## 2018-10-04 RX ADMIN — PRAVASTATIN SODIUM 40 MG: 40 TABLET ORAL at 20:53

## 2018-10-04 RX ADMIN — TAZOBACTAM SODIUM AND PIPERACILLIN SODIUM 3.38 G: 375; 3 INJECTION, SOLUTION INTRAVENOUS at 08:23

## 2018-10-04 RX ADMIN — HEPARIN SODIUM 18 UNITS/KG/HR: 10000 INJECTION, SOLUTION INTRAVENOUS at 12:55

## 2018-10-04 RX ADMIN — CARVEDILOL 3.12 MG: 3.12 TABLET, FILM COATED ORAL at 16:49

## 2018-10-04 RX ADMIN — AMOXICILLIN AND CLAVULANATE POTASSIUM 1 TABLET: 875; 125 TABLET, FILM COATED ORAL at 20:52

## 2018-10-04 RX ADMIN — SODIUM CHLORIDE: 9 INJECTION, SOLUTION INTRAVENOUS at 23:07

## 2018-10-04 RX ADMIN — Medication 10 ML: at 08:23

## 2018-10-04 RX ADMIN — NYSTATIN 500000 UNITS: 100000 SUSPENSION ORAL at 12:36

## 2018-10-04 RX ADMIN — CARVEDILOL 3.12 MG: 3.12 TABLET, FILM COATED ORAL at 08:21

## 2018-10-04 RX ADMIN — OXYCODONE HYDROCHLORIDE 5 MG: 5 TABLET ORAL at 20:54

## 2018-10-04 ASSESSMENT — PAIN SCALES - GENERAL
PAINLEVEL_OUTOF10: 0
PAINLEVEL_OUTOF10: 5
PAINLEVEL_OUTOF10: 7
PAINLEVEL_OUTOF10: 7
PAINLEVEL_OUTOF10: 0
PAINLEVEL_OUTOF10: 0
PAINLEVEL_OUTOF10: 3
PAINLEVEL_OUTOF10: 7
PAINLEVEL_OUTOF10: 3

## 2018-10-04 ASSESSMENT — ENCOUNTER SYMPTOMS
BACK PAIN: 0
NAUSEA: 0
ABDOMINAL PAIN: 1
EYE REDNESS: 0
SPUTUM PRODUCTION: 0
EYE DISCHARGE: 0
BLURRED VISION: 0
DOUBLE VISION: 0
CONSTIPATION: 0
DIARRHEA: 0
WHEEZING: 0
HEMOPTYSIS: 0
SORE THROAT: 0
COUGH: 0
SHORTNESS OF BREATH: 0

## 2018-10-04 ASSESSMENT — PAIN DESCRIPTION - ORIENTATION: ORIENTATION: RIGHT

## 2018-10-04 ASSESSMENT — PAIN DESCRIPTION - LOCATION
LOCATION: ABDOMEN

## 2018-10-04 ASSESSMENT — PAIN DESCRIPTION - PAIN TYPE
TYPE: ACUTE PAIN

## 2018-10-04 ASSESSMENT — PAIN DESCRIPTION - DESCRIPTORS
DESCRIPTORS: SHARP
DESCRIPTORS: ACHING
DESCRIPTORS: SHARP

## 2018-10-04 NOTE — PROGRESS NOTES
MCH  30.5  30.1  29.8   MCHC  33.3  33.2  32.8   RDW  16.0*  16.0*  15.6*        BMP:  Recent Labs      10/02/18   0746  10/03/18   0440  10/04/18   0446   NA  139  139  139   K  4.3  4.1  3.7   CL  101  100  101   CO2  28  27  26   BUN  19  18  17   CREATININE  1.3*  1.5*  1.5*   CALCIUM  9.4  9.1  8.7   GLUCOSE  106*  79  78        Hepatic Function Panel:   Lab Results   Component Value Date    ALKPHOS 108 10/03/2018    ALT 12 10/03/2018    AST 13 10/03/2018    PROT 7.6 10/03/2018    BILITOT 0.5 10/03/2018    BILIDIR <0.2 08/18/2018    IBILI see below 08/18/2018    LABALBU 3.1 10/03/2018       CPK: No results found for: CKTOTAL  ESR: No results found for: SEDRATE  CRP: No results found for: CRP        Imaging: All pertinent images and reports for the current visit were reviewed by me during this visit. XR CHEST PORTABLE   Final Result   No definite acute abnormality detected. Limited by patient's body habitus, portable technique, and by cardiomegaly. CT ABDOMEN PELVIS W IV CONTRAST Additional Contrast? None   Final Result   1. Mucosal thickening in haziness of the fat associated with the proximal   transverse colon is most suggestive of nonspecific infectious or inflammatory   colitis. 2. New irregular wedge-shaped heterogeneous hypodensity involving the   superomedial spleen. Findings are concerning for splenic infarction. 3. Stable appearance of cystic lesion involving the left adnexa measuring up   to 7.8 cm, which has shown interval growth over time. Given size, MRI with   IV contrast or surgical evaluation is suggested. Medications: All current and past medications were reviewed.      warfarin (COUMADIN) daily dosing (placeholder)   Other RX Placeholder    nystatin  5 mL Oral 4x Daily    aspirin  325 mg Oral Daily    carvedilol  3.125 mg Oral BID WC    digoxin  0.0625 mg Oral Daily    pravastatin  40 mg Oral Nightly    sodium chloride flush  10 mL Intravenous 2 times per day    montelukast  10 mg Oral Nightly    piperacillin-tazobactam  3.375 g Intravenous Q8H    ondansetron  4 mg Intravenous Once        heparin (porcine) 18 Units/kg/hr (10/04/18 1255)    sodium chloride 50 mL/hr at 10/03/18 1550    dextrose         oxyCODONE **OR** oxyCODONE, acetaminophen, heparin (porcine), heparin (porcine), sodium chloride flush, ondansetron, potassium chloride, magnesium sulfate, glucose, dextrose, glucagon (rDNA), dextrose, [DISCONTINUED] HYDROmorphone **OR** HYDROmorphone    Current antibiotics: All antibiotics and their doses were reviewed by me today    Recent Abx Admin                   piperacillin-tazobactam (ZOSYN) 3.375 g in dextrose 50 mL IVPB extended infusion (premix) (g) 3.375 g New Bag 10/04/18 0823     3.375 g New Bag 10/03/18 2307     3.375 g New Bag  1659                Known drug Allergies: All allergies were reviewed and updated    Allergies   Allergen Reactions    Pxlwmgtd-Kpsffxw-Zuotzz [Fluocinolone] Shortness Of Breath    Ciprofloxacin Shortness Of Breath    Diovan [Valsartan] Shortness Of Breath    Flagyl [Metronidazole] Shortness Of Breath     Has taken diflucan at home 12/7/15    Metformin And Related [Metformin And Related] Shortness Of Breath    Benazepril      Other reaction(s): Not Recorded    Morphine      Bad reaction. \"makes her feel horrible\".     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 Discussion:      The patient is currently on IV Zosyn for transfers colitis coverage. She is afebrile. CA-125 level was elevated. Coagulase negative Staphylococcus in the blood culture was likely a contaminant from the skin. 2-D echo reviewed. She has a bioprosthetic mitral valve. No obvious vegetations noted. Patient reports allergy to ciprofloxacin and metronidazole      Plan:     Diagnostic Workup:      · Continue to follow  fever curve, WBC count and blood cultures  · Follow up on her liver and renal functions    Antimicrobials:    · Will Stop IV Zosyn today  · Will order Augmentin 875 mg every 12 hours  · Will order oral probiotic tablet twice daily  · Continue to monitor her vitals closely  · Pain control  · Maintain good glycemic control  · DVT prophylaxis  · Discussed the above plan with patient, family and RN       Drug Monitoring:    · Continue monitoring for antibiotic toxicity as follows: CMP  · Continue to watch for following: new or worsening fever, new hypotension, hives, lip swelling and redness or purulence at vascular access sites. I/v access Management:    · Continue to monitor i.v access sites for erythema, induration, discharge or tenderness. · As always, continue efforts to minimize tubes/lines/drains as clinically appropriate to reduce chances of line associated infections. Patient education and counseling:    · The patient was educated in detail about the side-effects of various antibiotics and things to watch for like new rashes, lip swelling, severe reaction, worsening diarrhea, break through fever etc.  · Discussed patient's condition and what to expect. All of the patient's questions were addressed in a satisfactory manner and patient verbalized understanding all instructions. Weight loss counseling:    Extensive weight loss counseling was done.  It is important to set a realistic weight loss goal. [facility]   Office: [phone]  Fax: [phone]  Tuesday AM clinic:   [address] [address]  Thursday AM clinic: [address] [facility]

## 2018-10-04 NOTE — CONSULTS
Cardiovascular Consultation     Attending Physician: Spencer Mederos MD    PATIENT: Khanh Chirinos  : 1945  MRN: [id number]    Reason for Consultation:   Chief Complaint   Patient presents with    Abdominal Pain     RLQ pain onset today. recent cardiac surgery at the end of August. no abd. surgeries recently. nausea no vomiting. History of present illness:   Ms. Khanh hCirinos is a 71 y.o. female patient well known for recent MVR/CABG who returned with  concerned about labored breathing and abrupt right upper quadrant pain. Her INR was supratherautic on arrival. Today, her RUQ is \"touchable\" and she is getting ready to transition to clear liquids per surgery. Jonatan Gallagher has NG tube in as well. She and her  are compliant with medications and she says that he takes her pillboxes and and was not struggling until the Pharmacy added an antibiotic Dr. Emiliano Bell called in as well as due to reported serosanguio drainage at left leg venous site.          Diagnosis Date    Asthma     Atrial fibrillation (HCC)     Eosinophilia     Hemoptysis     HIGH CHOLESTEROL     Hx of blood clots     Hypertension     Irregular heart beat     Other specified gastritis without mention of hemorrhage     Palpitations     Skin cancer     basal and squamous    Type II or unspecified type diabetes mellitus without mention of complication, not stated as uncontrolled        Surgical History:      Procedure Laterality Date    BRONCHOSCOPY  2016    Dr. Raffy Erazo - brushings from [address] [place] [address],[place] [address]  2018    Dr. Magdalena Rockwell  2017    Dr. Rae Brooks - sigmoid diverticulosis, polypectomies x3    COLONOSCOPY  2014    Dr. Rae Brooks - sigmoid diverticulosis, polypectomies x3, internal hemorrhoids    CORONARY ARTERY BYPASS GRAFT  2018    Dr. Veena Lao - x3 (LIMA-LAD, L [id number])

## 2018-10-04 NOTE — PLAN OF CARE
Problem: Falls - Risk of:  Goal: Will remain free from falls  Will remain free from falls   Outcome: Ongoing  Patient has remained free from falls. Bed in lowest position. Call light within reach and uses appropriately. Family at bedside.

## 2018-10-04 NOTE — PROGRESS NOTES
Changed dressing on left groin site. Site is clean, dry, and intact. Minimal sangeous draining on old bandage. Will continue to monitor.

## 2018-10-04 NOTE — PLAN OF CARE
Problem: Falls - Risk of:  Goal: Will remain free from falls  Will remain free from falls   Outcome: Ongoing  Pt remains free from falls. Safety precautions in place. Bed in lowest position, bed wheels locked, medium fall risk, alert and oriented, able to call for assistance as needed, call light with in reach, yellow blanket in place, fall risk wrist band on, SAFE outside of doorway. Will continue to monitor. Problem: Pain:  Goal: Pain level will decrease  Pain level will decrease   Pt reports relief w/ 1mg Dilaudid as ordered, states overall her pain level is improving. Problem: Skin Integrity:  Goal: Skin integrity will stabilize  Skin integrity will stabilize   Outcome: Ongoing  Pt refusing turns, able to repositioned self, will continue to encourage, sleeping on right side in bed, green waffle cushion in chair.

## 2018-10-04 NOTE — CONSULTS
Consult from Dr. Lara Lorenzo requesting eliquis/xarelto cost to patient. Eliquis cost is $38.70/month when appropriate. Heme/onc specifically mentioned possible Eliquis transition.      Daryle Hemp PharmD, BCPS

## 2018-10-04 NOTE — PROGRESS NOTES
findings of pneumatosis or portal venous gas, vital signs stable, will monitor closely and manage conservatively. If deteriorates, may need re-imaging and/or surgical exploration  2. Will advance to clear liquids  3. IVF  4. Antibiotics per ID  5. Pain medication and antiemetics as needed with caution as may mask worsening exam, exam is improved compared to yesterday, certainly not worse  6. Monitor INR, hold coumadin, can use heparin/lovenox from surgical standpoint as they have shorter half lives, need INR under 1.5 to perform emergent surgery should it become needed. 7. Monitor anemia, defer, transfusion criteria to primary team, typically from surgical standpoint hemoglobin of 7 is transfusion trigger unless symptomatic    Tushar Booker MD, FACS  10/4/2018  10:34 AM

## 2018-10-04 NOTE — ONCOLOGY
stated plan with the patient and they verbalized understanding and agreed with the plan. Thank you for allowing us to participate in this patients care.     Leighann Schmidt MD, 10/4/2018, 8:13 AM

## 2018-10-05 LAB
ANION GAP SERPL CALCULATED.3IONS-SCNC: 14 MMOL/L (ref 3–16)
APTT: 72.3 SEC (ref 26–36)
APTT: 78.1 SEC (ref 26–36)
BASOPHILS ABSOLUTE: 0 K/UL (ref 0–0.2)
BASOPHILS RELATIVE PERCENT: 0.4 %
BLOOD CULTURE, ROUTINE: ABNORMAL
BLOOD CULTURE, ROUTINE: ABNORMAL
BUN BLDV-MCNC: 15 MG/DL (ref 7–20)
CALCIUM SERPL-MCNC: 7.9 MG/DL (ref 8.3–10.6)
CHLORIDE BLD-SCNC: 103 MMOL/L (ref 99–110)
CO2: 24 MMOL/L (ref 21–32)
CREAT SERPL-MCNC: 1.4 MG/DL (ref 0.6–1.2)
CULTURE, BLOOD 2: ABNORMAL
EOSINOPHILS ABSOLUTE: 0.2 K/UL (ref 0–0.6)
EOSINOPHILS RELATIVE PERCENT: 3.6 %
FERRITIN: 799.9 NG/ML (ref 15–150)
FOLATE: 7.77 NG/ML (ref 4.78–24.2)
GFR AFRICAN AMERICAN: 45
GFR NON-AFRICAN AMERICAN: 37
GLUCOSE BLD-MCNC: 128 MG/DL (ref 70–99)
GLUCOSE BLD-MCNC: 134 MG/DL (ref 70–99)
GLUCOSE BLD-MCNC: 167 MG/DL (ref 70–99)
GLUCOSE BLD-MCNC: 196 MG/DL (ref 70–99)
GLUCOSE BLD-MCNC: 207 MG/DL (ref 70–99)
HCT VFR BLD CALC: 23.5 % (ref 36–48)
HEMOGLOBIN: 7.6 G/DL (ref 12–16)
INR BLD: 1.66 (ref 0.86–1.14)
IRON SATURATION: 15 % (ref 15–50)
IRON: 29 UG/DL (ref 37–145)
LYMPHOCYTES ABSOLUTE: 1.1 K/UL (ref 1–5.1)
LYMPHOCYTES RELATIVE PERCENT: 19.6 %
MAGNESIUM: 2.5 MG/DL (ref 1.8–2.4)
MCH RBC QN AUTO: 29.3 PG (ref 26–34)
MCHC RBC AUTO-ENTMCNC: 32.4 G/DL (ref 31–36)
MCV RBC AUTO: 90.4 FL (ref 80–100)
MONOCYTES ABSOLUTE: 0.6 K/UL (ref 0–1.3)
MONOCYTES RELATIVE PERCENT: 10.8 %
NEUTROPHILS ABSOLUTE: 3.7 K/UL (ref 1.7–7.7)
NEUTROPHILS RELATIVE PERCENT: 65.6 %
ORGANISM: ABNORMAL
PDW BLD-RTO: 16 % (ref 12.4–15.4)
PERFORMED ON: ABNORMAL
PHOSPHORUS: 2.9 MG/DL (ref 2.5–4.9)
PLATELET # BLD: 277 K/UL (ref 135–450)
PMV BLD AUTO: 7.7 FL (ref 5–10.5)
POTASSIUM SERPL-SCNC: 3.7 MMOL/L (ref 3.5–5.1)
PROTHROMBIN TIME: 18.9 SEC (ref 9.8–13)
RBC # BLD: 2.6 M/UL (ref 4–5.2)
SODIUM BLD-SCNC: 141 MMOL/L (ref 136–145)
TOTAL IRON BINDING CAPACITY: 188 UG/DL (ref 260–445)
VITAMIN B-12: 209 PG/ML (ref 211–911)
WBC # BLD: 5.7 K/UL (ref 4–11)

## 2018-10-05 PROCEDURE — 84100 ASSAY OF PHOSPHORUS: CPT

## 2018-10-05 PROCEDURE — 6370000000 HC RX 637 (ALT 250 FOR IP): Performed by: INTERNAL MEDICINE

## 2018-10-05 PROCEDURE — 85730 THROMBOPLASTIN TIME PARTIAL: CPT

## 2018-10-05 PROCEDURE — 2580000003 HC RX 258: Performed by: INTERNAL MEDICINE

## 2018-10-05 PROCEDURE — 6360000002 HC RX W HCPCS: Performed by: INTERNAL MEDICINE

## 2018-10-05 PROCEDURE — 36415 COLL VENOUS BLD VENIPUNCTURE: CPT

## 2018-10-05 PROCEDURE — 82607 VITAMIN B-12: CPT

## 2018-10-05 PROCEDURE — 82746 ASSAY OF FOLIC ACID SERUM: CPT

## 2018-10-05 PROCEDURE — 2060000000 HC ICU INTERMEDIATE R&B

## 2018-10-05 PROCEDURE — 85610 PROTHROMBIN TIME: CPT

## 2018-10-05 PROCEDURE — 83540 ASSAY OF IRON: CPT

## 2018-10-05 PROCEDURE — 6370000000 HC RX 637 (ALT 250 FOR IP): Performed by: SURGERY

## 2018-10-05 PROCEDURE — 82728 ASSAY OF FERRITIN: CPT

## 2018-10-05 PROCEDURE — 85025 COMPLETE CBC W/AUTO DIFF WBC: CPT

## 2018-10-05 PROCEDURE — 83550 IRON BINDING TEST: CPT

## 2018-10-05 PROCEDURE — 83735 ASSAY OF MAGNESIUM: CPT

## 2018-10-05 PROCEDURE — 6370000000 HC RX 637 (ALT 250 FOR IP): Performed by: NURSE PRACTITIONER

## 2018-10-05 PROCEDURE — 99232 SBSQ HOSP IP/OBS MODERATE 35: CPT | Performed by: INTERNAL MEDICINE

## 2018-10-05 PROCEDURE — 80048 BASIC METABOLIC PNL TOTAL CA: CPT

## 2018-10-05 PROCEDURE — 99231 SBSQ HOSP IP/OBS SF/LOW 25: CPT | Performed by: SURGERY

## 2018-10-05 RX ORDER — POLYETHYLENE GLYCOL 3350 17 G/17G
17 POWDER, FOR SOLUTION ORAL DAILY
Status: DISCONTINUED | OUTPATIENT
Start: 2018-10-05 | End: 2018-10-12 | Stop reason: HOSPADM

## 2018-10-05 RX ORDER — GLYBURIDE 2.5 MG/1
2.5 TABLET ORAL
Status: DISCONTINUED | OUTPATIENT
Start: 2018-10-06 | End: 2018-10-06

## 2018-10-05 RX ORDER — DOCUSATE SODIUM 100 MG/1
100 CAPSULE, LIQUID FILLED ORAL 2 TIMES DAILY
Status: DISCONTINUED | OUTPATIENT
Start: 2018-10-05 | End: 2018-10-07

## 2018-10-05 RX ADMIN — Medication 1 CAPSULE: at 16:41

## 2018-10-05 RX ADMIN — NYSTATIN 500000 UNITS: 100000 SUSPENSION ORAL at 16:42

## 2018-10-05 RX ADMIN — ONDANSETRON 4 MG: 2 INJECTION INTRAMUSCULAR; INTRAVENOUS at 22:18

## 2018-10-05 RX ADMIN — HEPARIN SODIUM 18 UNITS/KG/HR: 10000 INJECTION, SOLUTION INTRAVENOUS at 02:28

## 2018-10-05 RX ADMIN — NYSTATIN 500000 UNITS: 100000 SUSPENSION ORAL at 11:56

## 2018-10-05 RX ADMIN — MONTELUKAST SODIUM 10 MG: 10 TABLET, FILM COATED ORAL at 22:21

## 2018-10-05 RX ADMIN — ASPIRIN 325 MG: 325 TABLET, DELAYED RELEASE ORAL at 08:32

## 2018-10-05 RX ADMIN — Medication 1 CAPSULE: at 08:32

## 2018-10-05 RX ADMIN — DIGOXIN 0.06 MG: 125 TABLET ORAL at 08:32

## 2018-10-05 RX ADMIN — AMOXICILLIN AND CLAVULANATE POTASSIUM 1 TABLET: 875; 125 TABLET, FILM COATED ORAL at 22:21

## 2018-10-05 RX ADMIN — CARVEDILOL 3.12 MG: 3.12 TABLET, FILM COATED ORAL at 16:41

## 2018-10-05 RX ADMIN — INSULIN LISPRO 2 UNITS: 100 INJECTION, SOLUTION INTRAVENOUS; SUBCUTANEOUS at 16:42

## 2018-10-05 RX ADMIN — OXYCODONE HYDROCHLORIDE 5 MG: 5 TABLET ORAL at 02:30

## 2018-10-05 RX ADMIN — PRAVASTATIN SODIUM 40 MG: 40 TABLET ORAL at 22:21

## 2018-10-05 RX ADMIN — HEPARIN SODIUM 18 UNITS/KG/HR: 10000 INJECTION, SOLUTION INTRAVENOUS at 19:39

## 2018-10-05 RX ADMIN — Medication 10 ML: at 22:19

## 2018-10-05 RX ADMIN — AMOXICILLIN AND CLAVULANATE POTASSIUM 1 TABLET: 875; 125 TABLET, FILM COATED ORAL at 08:32

## 2018-10-05 RX ADMIN — NYSTATIN 500000 UNITS: 100000 SUSPENSION ORAL at 22:22

## 2018-10-05 RX ADMIN — OXYCODONE HYDROCHLORIDE 5 MG: 5 TABLET ORAL at 22:21

## 2018-10-05 RX ADMIN — CARVEDILOL 3.12 MG: 3.12 TABLET, FILM COATED ORAL at 08:32

## 2018-10-05 RX ADMIN — INSULIN LISPRO 1 UNITS: 100 INJECTION, SOLUTION INTRAVENOUS; SUBCUTANEOUS at 11:58

## 2018-10-05 RX ADMIN — INSULIN LISPRO 1 UNITS: 100 INJECTION, SOLUTION INTRAVENOUS; SUBCUTANEOUS at 22:14

## 2018-10-05 RX ADMIN — NYSTATIN 500000 UNITS: 100000 SUSPENSION ORAL at 08:32

## 2018-10-05 ASSESSMENT — ENCOUNTER SYMPTOMS
BLURRED VISION: 0
EYE DISCHARGE: 0
SORE THROAT: 0
BACK PAIN: 0
DIARRHEA: 0
COUGH: 0
NAUSEA: 0
CONSTIPATION: 0
SHORTNESS OF BREATH: 0
DOUBLE VISION: 0
ABDOMINAL PAIN: 1
SPUTUM PRODUCTION: 0
EYE REDNESS: 0
HEMOPTYSIS: 0
WHEEZING: 0

## 2018-10-05 ASSESSMENT — PAIN SCALES - GENERAL
PAINLEVEL_OUTOF10: 0
PAINLEVEL_OUTOF10: 0
PAINLEVEL_OUTOF10: 5
PAINLEVEL_OUTOF10: 6

## 2018-10-05 NOTE — PROGRESS NOTES
Patient states that she \"feels funny\" and \"feels jumpy\" and has been all morning. Patient currently in afib. Expressed that she wants someone from cardiology to see her because her dig was decreased after her recent cabg compared to what she was on previously. Spoke with cardiology RN. Cardiology to come assess patient. VSSWill continue to monitor.

## 2018-10-05 NOTE — PROGRESS NOTES
results for input(s): AMYLASE in the last 72 hours. Lipase:   No results for input(s): LIPASE in the last 72 hours. Mag:      Recent Labs      10/03/18   0440  10/04/18   0446  10/05/18   0438   MG  2.50*  2.40  2.50*      Phos:     Recent Labs      10/03/18   0440  10/04/18   0446  10/05/18   0438   PHOS  4.3  3.6  2.9      Coags:   Recent Labs      10/03/18   0441  10/04/18   0446   10/04/18   1747  10/05/18   0007  10/05/18   0438   INR  2.49*  1.71*   --    --    --   1.66*   APTT  40.8*   --    < >  75.5*  72.3*  78.1*    < > = values in this interval not displayed. Cultures:  Anaerobic culture  No results for input(s): LABANAE in the last 72 hours. Blood culture  Recent Labs      10/03/18   1642   BC  No Growth to date. Any change in status will be called. Blood culture 2  No results for input(s): BLOODCULT2 in the last 72 hours. Body fluid culture  No results for input(s): BLOODCULT2 in the last 72 hours. Surgical culture  No results for input(s): CXSURG in the last 72 hours. Fecal occult  No results for input(s): OCCULTBLDFEC in the last 72 hours. Gram stain  No results for input(s): LABGRAM in the last 72 hours. Stool culture 1  No results for input(s): CXST in the last 72 hours. Stool culture 2  No results for input(s): STOOLCULT2 in the last 72 hours. Urine culture  No results for input(s): LABURIN in the last 72 hours. Wound abscess  No results for input(s): WNDABS in the last 72 hours. Pathology:  NA    Imaging:  I have personally reviewed the following films:    Ct Abdomen Pelvis W Iv Contrast Additional Contrast? None    Result Date: 10/1/2018  EXAMINATION: CT OF THE ABDOMEN AND PELVIS WITH CONTRAST 10/1/2018 6:24 pm TECHNIQUE: CT of the abdomen and pelvis was performed with the administration of intravenous contrast. Multiplanar reformatted images are provided for review.  Dose modulation, iterative reconstruction, and/or weight based adjustment of the WC    insulin lispro  0-3 Units Subcutaneous Nightly    warfarin (COUMADIN) daily dosing (placeholder)   Other RX Placeholder    nystatin  5 mL Oral 4x Daily    aspirin  325 mg Oral Daily    carvedilol  3.125 mg Oral BID     digoxin  0.0625 mg Oral Daily    pravastatin  40 mg Oral Nightly    sodium chloride flush  10 mL Intravenous 2 times per day    montelukast  10 mg Oral Nightly    ondansetron  4 mg Intravenous Once     Continuous Infusions:   heparin (porcine) 18 Units/kg/hr (10/05/18 0538)    dextrose       PRN Meds:.oxyCODONE **OR** oxyCODONE, acetaminophen, sodium chloride flush, ondansetron, potassium chloride, magnesium sulfate, glucose, dextrose, glucagon (rDNA), dextrose, [DISCONTINUED] HYDROmorphone **OR** HYDROmorphone      Assessment:  71 y.o. female admitted with   1. Abdominal pain, right upper quadrant    2. Colitis    3. Splenic infarct    4. Supratherapeutic INR    5. Elevated troponin      Ms. Nas Coyne is a 71 y.o. female who presents with   Ischemic vs infectious colitis  Splenic infarction  Atrial fibrillation on coumadin  Supratherapeutic INR  CAD, s/p CABG  Bactremia     Plan:  1. Abdominal exam is stable, lactic acid normal, CT without findings of pneumatosis or portal venous gas, vital signs stable, will monitor closely and manage conservatively. If deteriorates, may need re-imaging and/or surgical exploration  2. Tolerating clear liquids, will advance to full liquids   3. IVF  4. Antibiotics per ID  5. Pain medication and antiemetics as needed with caution as may mask worsening exam, exam is improved compared to yesterday, certainly not worse  6. Monitor INR, surgical intervention is unlikely, may resume Coumadin from surgery standpoint 7. Monitor anemia, defer, transfusion criteria to primary team, typically from surgical standpoint hemoglobin of 7 is transfusion trigger unless symptomatic  8. Monitor bowel function, passing flatus but no stool.   Will increase bowel

## 2018-10-05 NOTE — PROGRESS NOTES
Oncology and Hematology Care   Progress Note    10/5/2018    SUBJECTIVE:  Patient feeling better, up in bedside chair, less abdominal tenderness. No fever. Denies bleeding. OBJECTIVE:    Physical Assessment:  Vitals:  /72   Pulse 75   Temp 97 °F (36.1 °C) (Temporal)   Resp 18   Ht 5' 8\" (1.727 m)   Wt 215 lb 9.8 oz (97.8 kg)   SpO2 95%   BMI 32.78 kg/m²    24HR INTAKE/OUTPUT:  No intake or output data in the 24 hours ending 10/05/18 1122    CONSTITUTIONAL:  Awake, alert & oriented x3  HEENT: PERRL, Neck: soft, supple, no cervical, supraclavicular or axillary adenopathy  RESPIRATORY:  No increased work of breathing, breath sounds decreased.   CARDIOVASCULAR:  Cardiac S1/S2, RRR  GASTROINTESTINAL:  abdomen soft +BS x4, no hepatosplenomegaly mild tenderness in right mid abdomen   SKIN:  negative for rash and skin lesion(s)  MUSCULOSKELETAL:  negative for pain and muscle weakness  EXTREMITIES: Negative for Lower extremity edema    Labs Results:    CBC:   Recent Labs      10/04/18   0446  10/04/18   1241  10/05/18   0438   WBC  7.8  7.3  5.7   HGB  7.6*  7.7*  7.6*   HCT  22.8*  23.6*  23.5*   MCV  90.6  90.9  90.4   PLT  283  278  277     BMP:   Recent Labs      10/03/18   0440  10/04/18   0446  10/05/18   0438   NA  139  139  141   K  4.1  3.7  3.7   CL  100  101  103   CO2  27  26  24   PHOS  4.3  3.6  2.9   BUN  18  17  15   CREATININE  1.5*  1.5*  1.4*     LIVER PROFILE:   Recent Labs      10/03/18   0440   AST  13*   ALT  12   BILITOT  0.5   ALKPHOS  108     PT/INR:    Lab Results   Component Value Date    PROTIME 18.9 10/05/2018    PROTIME 19.5 10/04/2018    PROTIME 28.4 10/03/2018    INR 1.66 10/05/2018    INR 1.71 10/04/2018    INR 2.49 10/03/2018     PTT:    Lab Results   Component Value Date    APTT 78.1 10/05/2018    APTT 72.3 10/05/2018    APTT 75.5 10/04/2018     UA:  No results for input(s): NITRITE, COLORU, PHUR, LABCAST, WBCUA, RBCUA, MUCUS, TRICHOMONAS, YEAST, BACTERIA, CLARITYU, you for allowing us to participate in this patients care.     Temitope Reis, SOLEDAD - CNP, 10/5/2018, 11:22 AM     Patient seen with nurse practitioner agree with note above

## 2018-10-05 NOTE — PROGRESS NOTES
[facility]    10/5/2018 3:27 PM        Name: Coty Zepeda . Admitted: 10/1/2018  Primary Care Provider: Marnie Chilel MD (Tel: [phone])    Brief Course:        CC: abdominal pain    Subjective:  .     Feels better   Sitting in the chair   No fever, no chills     Current Medications    docusate sodium (COLACE) capsule 100 mg BID   polyethylene glycol (GLYCOLAX) packet 17 g Daily   oxyCODONE (ROXICODONE) immediate release tablet 5 mg Q4H PRN   Or    oxyCODONE (ROXICODONE) immediate release tablet 10 mg Q4H PRN   acetaminophen (TYLENOL) tablet 650 mg Q4H PRN   heparin 25,000 units in dextrose 5% 250 mL infusion Continuous   amoxicillin-clavulanate (AUGMENTIN) 875-125 MG per tablet 1 tablet 2 times per day   lactobacillus (CULTURELLE) capsule 1 capsule BID WC   insulin lispro (HUMALOG) injection pen 0-6 Units TID WC   insulin lispro (HUMALOG) injection pen 0-3 Units Nightly   nystatin (MYCOSTATIN) 704688 UNIT/ML suspension 500,000 Units 4x Daily   aspirin EC tablet 325 mg Daily   carvedilol (COREG) tablet 3.125 mg BID WC   digoxin (LANOXIN) tablet 0.0625 mg Daily   pravastatin (PRAVACHOL) tablet 40 mg Nightly   sodium chloride flush 0.9 % injection 10 mL 2 times per day   sodium chloride flush 0.9 % injection 10 mL PRN   ondansetron (ZOFRAN) injection 4 mg Q6H PRN   potassium chloride 10 mEq/100 mL IVPB (Peripheral Line) PRN   magnesium sulfate 1 g in dextrose 5% 100 mL IVPB PRN   glucose (GLUTOSE) 40 % oral gel 15 g PRN   dextrose 50 % solution 12.5 g PRN   glucagon (rDNA) injection 1 mg PRN   dextrose 5 % solution PRN   montelukast (SINGULAIR) tablet 10 mg Nightly   HYDROmorphone (DILAUDID) injection 1 mg Q4H PRN   ondansetron (ZOFRAN) injection 4 mg Once       Objective:  /83   Pulse 68   Temp 97.3 °F (36.3 °C) (Temporal)   Resp 18   Ht 5' 8\" (1.727 m)   Wt 215 lb 9.8 oz (97.8 kg)

## 2018-10-05 NOTE — PROGRESS NOTES
MCV  90.6  90.9  90.4   MCH  30.1  29.8  29.3   MCHC  33.2  32.8  32.4   RDW  16.0*  15.6*  16.0*        BMP:  Recent Labs      10/03/18   0440  10/04/18   0446  10/05/18   0438   NA  139  139  141   K  4.1  3.7  3.7   CL  100  101  103   CO2  27  26  24   BUN  18  17  15   CREATININE  1.5*  1.5*  1.4*   CALCIUM  9.1  8.7  7.9*   GLUCOSE  79  78  128*        Hepatic Function Panel:   Lab Results   Component Value Date    ALKPHOS 108 10/03/2018    ALT 12 10/03/2018    AST 13 10/03/2018    PROT 7.6 10/03/2018    BILITOT 0.5 10/03/2018    BILIDIR <0.2 08/18/2018    IBILI see below 08/18/2018    LABALBU 3.1 10/03/2018       CPK: No results found for: CKTOTAL  ESR: No results found for: SEDRATE  CRP: No results found for: CRP        Imaging: All pertinent images and reports for the current visit were reviewed by me during this visit. XR CHEST PORTABLE   Final Result   No definite acute abnormality detected. Limited by patient's body habitus, portable technique, and by cardiomegaly. CT ABDOMEN PELVIS W IV CONTRAST Additional Contrast? None   Final Result   1. Mucosal thickening in haziness of the fat associated with the proximal   transverse colon is most suggestive of nonspecific infectious or inflammatory   colitis. 2. New irregular wedge-shaped heterogeneous hypodensity involving the   superomedial spleen. Findings are concerning for splenic infarction. 3. Stable appearance of cystic lesion involving the left adnexa measuring up   to 7.8 cm, which has shown interval growth over time. Given size, MRI with   IV contrast or surgical evaluation is suggested. Medications: All current and past medications were reviewed.      docusate sodium  100 mg Oral BID    polyethylene glycol  17 g Oral Daily    amoxicillin-clavulanate  1 tablet Oral 2 times per day    lactobacillus  1 capsule Oral BID WC    insulin lispro  0-6 Units Subcutaneous TID WC    insulin lispro  0-3 Units Subcutaneous Nightly    nystatin  5 mL Oral 4x Daily    aspirin  325 mg Oral Daily    carvedilol  3.125 mg Oral BID WC    digoxin  0.0625 mg Oral Daily    pravastatin  40 mg Oral Nightly    sodium chloride flush  10 mL Intravenous 2 times per day    montelukast  10 mg Oral Nightly    ondansetron  4 mg Intravenous Once        heparin (porcine) 18 Units/kg/hr (10/05/18 0538)    dextrose         oxyCODONE **OR** oxyCODONE, acetaminophen, sodium chloride flush, ondansetron, potassium chloride, magnesium sulfate, glucose, dextrose, glucagon (rDNA), dextrose, [DISCONTINUED] HYDROmorphone **OR** HYDROmorphone    Current antibiotics: All antibiotics and their doses were reviewed by me today    Recent Abx Admin                   amoxicillin-clavulanate (AUGMENTIN) 875-125 MG per tablet 1 tablet (tablet) 1 tablet Given 10/05/18 0832     1 tablet Given 10/04/18 2052                Known drug Allergies: All allergies were reviewed and updated    Allergies   Allergen Reactions    Orvdelae-Glamhbc-Xaxjit [Fluocinolone] Shortness Of Breath    Ciprofloxacin Shortness Of Breath    Diovan [Valsartan] Shortness Of Breath    Flagyl [Metronidazole] Shortness Of Breath     Has taken diflucan at home 12/7/15    Metformin And Related [Metformin And Related] Shortness Of Breath    Benazepril      Other reaction(s): Not Recorded    Morphine      Bad reaction. \"makes her feel horrible\".     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 Importance of washing the feet everyday with soap and water and keeping them dry, and seeking prompt medical attention in case of a non-healing wound or ulcer on the feet was also highlighted. TIME SPENT TODAY:     - Spent over  26  minutes on visit (including interval history, physical exam, review of data including labs, cultures, imaging, development and implementation of treatment plan and coordination of complex care). - Over 50% of time spent with patient face to face on counseling and education. Please note that this chart was generated using Dragon dictation software. Although every effort was made to ensure the accuracy of this automated transcription, some errors in transcription may have occurred inadvertently. If you may need any clarification, please do not hesitate to contact me through EPIC or at the phone number provided below with my electronic signature. Thank you for involving me in the care of your patient. I will continue to follow. If you have any additional questions, please do not hesitate to contact me.     Adeola Coats MD, MPH  10/5/2018, 12:14 PM  [facility] Infectious Disease   Office: [phone]  Fax: [phone]  Tuesday AM clinic:   [address] [place] [address]  Thursday AM clinic: [address] [facility]

## 2018-10-06 LAB
ANION GAP SERPL CALCULATED.3IONS-SCNC: 12 MMOL/L (ref 3–16)
APTT: 56 SEC (ref 26–36)
BASOPHILS ABSOLUTE: 0.1 K/UL (ref 0–0.2)
BASOPHILS RELATIVE PERCENT: 1.3 %
BUN BLDV-MCNC: 11 MG/DL (ref 7–20)
CALCIUM SERPL-MCNC: 8.8 MG/DL (ref 8.3–10.6)
CHLORIDE BLD-SCNC: 105 MMOL/L (ref 99–110)
CO2: 24 MMOL/L (ref 21–32)
CREAT SERPL-MCNC: 1.2 MG/DL (ref 0.6–1.2)
EOSINOPHILS ABSOLUTE: 0.3 K/UL (ref 0–0.6)
EOSINOPHILS RELATIVE PERCENT: 4.2 %
GFR AFRICAN AMERICAN: 53
GFR NON-AFRICAN AMERICAN: 44
GLUCOSE BLD-MCNC: 130 MG/DL (ref 70–99)
GLUCOSE BLD-MCNC: 135 MG/DL (ref 70–99)
GLUCOSE BLD-MCNC: 137 MG/DL (ref 70–99)
GLUCOSE BLD-MCNC: 142 MG/DL (ref 70–99)
GLUCOSE BLD-MCNC: 96 MG/DL (ref 70–99)
HCT VFR BLD CALC: 27 % (ref 36–48)
HEMOGLOBIN: 8.7 G/DL (ref 12–16)
INR BLD: 1.48 (ref 0.86–1.14)
LYMPHOCYTES ABSOLUTE: 1.4 K/UL (ref 1–5.1)
LYMPHOCYTES RELATIVE PERCENT: 22.9 %
MCH RBC QN AUTO: 28.7 PG (ref 26–34)
MCHC RBC AUTO-ENTMCNC: 32.1 G/DL (ref 31–36)
MCV RBC AUTO: 89.3 FL (ref 80–100)
MONOCYTES ABSOLUTE: 0.6 K/UL (ref 0–1.3)
MONOCYTES RELATIVE PERCENT: 10.2 %
NEUTROPHILS ABSOLUTE: 3.7 K/UL (ref 1.7–7.7)
NEUTROPHILS RELATIVE PERCENT: 61.4 %
PDW BLD-RTO: 15.9 % (ref 12.4–15.4)
PERFORMED ON: ABNORMAL
PERFORMED ON: NORMAL
PLATELET # BLD: 312 K/UL (ref 135–450)
PMV BLD AUTO: 8.3 FL (ref 5–10.5)
POTASSIUM REFLEX MAGNESIUM: 3.8 MMOL/L (ref 3.5–5.1)
PROTHROMBIN TIME: 16.9 SEC (ref 9.8–13)
RBC # BLD: 3.02 M/UL (ref 4–5.2)
SODIUM BLD-SCNC: 141 MMOL/L (ref 136–145)
WBC # BLD: 6.1 K/UL (ref 4–11)

## 2018-10-06 PROCEDURE — 85025 COMPLETE CBC W/AUTO DIFF WBC: CPT

## 2018-10-06 PROCEDURE — 80048 BASIC METABOLIC PNL TOTAL CA: CPT

## 2018-10-06 PROCEDURE — 6370000000 HC RX 637 (ALT 250 FOR IP): Performed by: SURGERY

## 2018-10-06 PROCEDURE — 85610 PROTHROMBIN TIME: CPT

## 2018-10-06 PROCEDURE — 6370000000 HC RX 637 (ALT 250 FOR IP): Performed by: INTERNAL MEDICINE

## 2018-10-06 PROCEDURE — 2060000000 HC ICU INTERMEDIATE R&B

## 2018-10-06 PROCEDURE — 2580000003 HC RX 258: Performed by: INTERNAL MEDICINE

## 2018-10-06 PROCEDURE — 36415 COLL VENOUS BLD VENIPUNCTURE: CPT

## 2018-10-06 PROCEDURE — 6360000002 HC RX W HCPCS: Performed by: INTERNAL MEDICINE

## 2018-10-06 PROCEDURE — 6370000000 HC RX 637 (ALT 250 FOR IP): Performed by: NURSE PRACTITIONER

## 2018-10-06 PROCEDURE — 99232 SBSQ HOSP IP/OBS MODERATE 35: CPT | Performed by: SURGERY

## 2018-10-06 PROCEDURE — 85730 THROMBOPLASTIN TIME PARTIAL: CPT

## 2018-10-06 RX ORDER — TORSEMIDE 20 MG/1
20 TABLET ORAL DAILY
Status: DISCONTINUED | OUTPATIENT
Start: 2018-10-06 | End: 2018-10-12 | Stop reason: HOSPADM

## 2018-10-06 RX ORDER — GLIMEPIRIDE 2 MG/1
2 TABLET ORAL
Status: DISCONTINUED | OUTPATIENT
Start: 2018-10-06 | End: 2018-10-07

## 2018-10-06 RX ADMIN — ASPIRIN 325 MG: 325 TABLET, DELAYED RELEASE ORAL at 08:39

## 2018-10-06 RX ADMIN — Medication 10 ML: at 08:41

## 2018-10-06 RX ADMIN — DIGOXIN 0.06 MG: 125 TABLET ORAL at 08:40

## 2018-10-06 RX ADMIN — NYSTATIN 500000 UNITS: 100000 SUSPENSION ORAL at 08:39

## 2018-10-06 RX ADMIN — TORSEMIDE 20 MG: 20 TABLET ORAL at 12:18

## 2018-10-06 RX ADMIN — GLIMEPIRIDE 2 MG: 2 TABLET ORAL at 09:21

## 2018-10-06 RX ADMIN — OXYCODONE HYDROCHLORIDE 10 MG: 5 TABLET ORAL at 20:55

## 2018-10-06 RX ADMIN — NYSTATIN 500000 UNITS: 100000 SUSPENSION ORAL at 21:57

## 2018-10-06 RX ADMIN — DOCUSATE SODIUM 100 MG: 100 CAPSULE, LIQUID FILLED ORAL at 08:40

## 2018-10-06 RX ADMIN — NYSTATIN 500000 UNITS: 100000 SUSPENSION ORAL at 17:36

## 2018-10-06 RX ADMIN — Medication 1 CAPSULE: at 08:41

## 2018-10-06 RX ADMIN — AMOXICILLIN AND CLAVULANATE POTASSIUM 1 TABLET: 875; 125 TABLET, FILM COATED ORAL at 21:57

## 2018-10-06 RX ADMIN — CARVEDILOL 3.12 MG: 3.12 TABLET, FILM COATED ORAL at 17:36

## 2018-10-06 RX ADMIN — Medication 1 CAPSULE: at 17:36

## 2018-10-06 RX ADMIN — HEPARIN SODIUM 18 UNITS/KG/HR: 10000 INJECTION, SOLUTION INTRAVENOUS at 08:42

## 2018-10-06 RX ADMIN — PRAVASTATIN SODIUM 40 MG: 40 TABLET ORAL at 21:57

## 2018-10-06 RX ADMIN — NYSTATIN 500000 UNITS: 100000 SUSPENSION ORAL at 12:18

## 2018-10-06 RX ADMIN — CARVEDILOL 3.12 MG: 3.12 TABLET, FILM COATED ORAL at 08:41

## 2018-10-06 RX ADMIN — OXYCODONE HYDROCHLORIDE 5 MG: 5 TABLET ORAL at 16:07

## 2018-10-06 RX ADMIN — INSULIN LISPRO 1 UNITS: 100 INJECTION, SOLUTION INTRAVENOUS; SUBCUTANEOUS at 12:19

## 2018-10-06 RX ADMIN — OXYCODONE HYDROCHLORIDE 10 MG: 5 TABLET ORAL at 02:26

## 2018-10-06 RX ADMIN — ONDANSETRON 4 MG: 2 INJECTION INTRAMUSCULAR; INTRAVENOUS at 02:26

## 2018-10-06 RX ADMIN — DOCUSATE SODIUM 100 MG: 100 CAPSULE, LIQUID FILLED ORAL at 21:58

## 2018-10-06 RX ADMIN — MONTELUKAST SODIUM 10 MG: 10 TABLET, FILM COATED ORAL at 21:57

## 2018-10-06 RX ADMIN — AMOXICILLIN AND CLAVULANATE POTASSIUM 1 TABLET: 875; 125 TABLET, FILM COATED ORAL at 08:40

## 2018-10-06 RX ADMIN — SODIUM CHLORIDE, PRESERVATIVE FREE 10 ML: 5 INJECTION INTRAVENOUS at 02:26

## 2018-10-06 RX ADMIN — HEPARIN SODIUM 18 UNITS/KG/HR: 10000 INJECTION, SOLUTION INTRAVENOUS at 23:36

## 2018-10-06 ASSESSMENT — PAIN DESCRIPTION - LOCATION: LOCATION: BACK

## 2018-10-06 ASSESSMENT — PAIN DESCRIPTION - DESCRIPTORS: DESCRIPTORS: CONSTANT;ACHING;SORE

## 2018-10-06 ASSESSMENT — PAIN DESCRIPTION - ORIENTATION: ORIENTATION: LOWER

## 2018-10-06 ASSESSMENT — PAIN SCALES - GENERAL
PAINLEVEL_OUTOF10: 5
PAINLEVEL_OUTOF10: 0
PAINLEVEL_OUTOF10: 7
PAINLEVEL_OUTOF10: 7

## 2018-10-06 ASSESSMENT — PAIN DESCRIPTION - FREQUENCY: FREQUENCY: CONTINUOUS

## 2018-10-06 ASSESSMENT — PAIN DESCRIPTION - PAIN TYPE: TYPE: CHRONIC PAIN

## 2018-10-07 LAB
ANION GAP SERPL CALCULATED.3IONS-SCNC: 9 MMOL/L (ref 3–16)
APTT: 77.8 SEC (ref 26–36)
BUN BLDV-MCNC: 8 MG/DL (ref 7–20)
CALCIUM SERPL-MCNC: 8.9 MG/DL (ref 8.3–10.6)
CHLORIDE BLD-SCNC: 105 MMOL/L (ref 99–110)
CO2: 28 MMOL/L (ref 21–32)
CREAT SERPL-MCNC: 1.3 MG/DL (ref 0.6–1.2)
GFR AFRICAN AMERICAN: 49
GFR NON-AFRICAN AMERICAN: 40
GLUCOSE BLD-MCNC: 124 MG/DL (ref 70–99)
GLUCOSE BLD-MCNC: 132 MG/DL (ref 70–99)
GLUCOSE BLD-MCNC: 151 MG/DL (ref 70–99)
GLUCOSE BLD-MCNC: 161 MG/DL (ref 70–99)
GLUCOSE BLD-MCNC: 83 MG/DL (ref 70–99)
GLUCOSE BLD-MCNC: 88 MG/DL (ref 70–99)
HCT VFR BLD CALC: 24.5 % (ref 36–48)
HEMOGLOBIN: 8 G/DL (ref 12–16)
INR BLD: 1.51 (ref 0.86–1.14)
MAGNESIUM: 2.1 MG/DL (ref 1.8–2.4)
MCH RBC QN AUTO: 29.2 PG (ref 26–34)
MCHC RBC AUTO-ENTMCNC: 32.7 G/DL (ref 31–36)
MCV RBC AUTO: 89.3 FL (ref 80–100)
PDW BLD-RTO: 16.4 % (ref 12.4–15.4)
PERFORMED ON: ABNORMAL
PERFORMED ON: NORMAL
PLATELET # BLD: 295 K/UL (ref 135–450)
PMV BLD AUTO: 7.7 FL (ref 5–10.5)
POTASSIUM REFLEX MAGNESIUM: 3.3 MMOL/L (ref 3.5–5.1)
POTASSIUM SERPL-SCNC: 3.7 MMOL/L (ref 3.5–5.1)
PROTHROMBIN TIME: 17.2 SEC (ref 9.8–13)
RBC # BLD: 2.75 M/UL (ref 4–5.2)
REASON FOR REJECTION: NORMAL
REJECTED TEST: NORMAL
SODIUM BLD-SCNC: 142 MMOL/L (ref 136–145)
WBC # BLD: 5.7 K/UL (ref 4–11)

## 2018-10-07 PROCEDURE — 85730 THROMBOPLASTIN TIME PARTIAL: CPT

## 2018-10-07 PROCEDURE — 6370000000 HC RX 637 (ALT 250 FOR IP): Performed by: INTERNAL MEDICINE

## 2018-10-07 PROCEDURE — 2060000000 HC ICU INTERMEDIATE R&B

## 2018-10-07 PROCEDURE — 6360000002 HC RX W HCPCS: Performed by: INTERNAL MEDICINE

## 2018-10-07 PROCEDURE — 84132 ASSAY OF SERUM POTASSIUM: CPT

## 2018-10-07 PROCEDURE — 85610 PROTHROMBIN TIME: CPT

## 2018-10-07 PROCEDURE — 36415 COLL VENOUS BLD VENIPUNCTURE: CPT

## 2018-10-07 PROCEDURE — 83735 ASSAY OF MAGNESIUM: CPT

## 2018-10-07 PROCEDURE — 2580000003 HC RX 258: Performed by: INTERNAL MEDICINE

## 2018-10-07 PROCEDURE — 6370000000 HC RX 637 (ALT 250 FOR IP): Performed by: NURSE PRACTITIONER

## 2018-10-07 PROCEDURE — G0328 FECAL BLOOD SCRN IMMUNOASSAY: HCPCS

## 2018-10-07 PROCEDURE — 80048 BASIC METABOLIC PNL TOTAL CA: CPT

## 2018-10-07 PROCEDURE — 6370000000 HC RX 637 (ALT 250 FOR IP): Performed by: HOSPITALIST

## 2018-10-07 PROCEDURE — 85027 COMPLETE CBC AUTOMATED: CPT

## 2018-10-07 PROCEDURE — 6370000000 HC RX 637 (ALT 250 FOR IP): Performed by: SURGERY

## 2018-10-07 PROCEDURE — 99231 SBSQ HOSP IP/OBS SF/LOW 25: CPT | Performed by: SURGERY

## 2018-10-07 RX ORDER — LANOLIN ALCOHOL/MO/W.PET/CERES
500 CREAM (GRAM) TOPICAL DAILY
Status: DISCONTINUED | OUTPATIENT
Start: 2018-10-07 | End: 2018-10-12 | Stop reason: HOSPADM

## 2018-10-07 RX ORDER — FERROUS SULFATE 325(65) MG
325 TABLET ORAL 2 TIMES DAILY WITH MEALS
Status: DISCONTINUED | OUTPATIENT
Start: 2018-10-07 | End: 2018-10-12 | Stop reason: HOSPADM

## 2018-10-07 RX ORDER — POTASSIUM CHLORIDE 750 MG/1
30 TABLET, FILM COATED, EXTENDED RELEASE ORAL ONCE
Status: COMPLETED | OUTPATIENT
Start: 2018-10-07 | End: 2018-10-07

## 2018-10-07 RX ORDER — GLIMEPIRIDE 2 MG/1
1 TABLET ORAL
Status: DISCONTINUED | OUTPATIENT
Start: 2018-10-08 | End: 2018-10-12 | Stop reason: HOSPADM

## 2018-10-07 RX ADMIN — CARVEDILOL 3.12 MG: 3.12 TABLET, FILM COATED ORAL at 17:10

## 2018-10-07 RX ADMIN — Medication 1 CAPSULE: at 08:51

## 2018-10-07 RX ADMIN — NYSTATIN 500000 UNITS: 100000 SUSPENSION ORAL at 08:53

## 2018-10-07 RX ADMIN — AMOXICILLIN AND CLAVULANATE POTASSIUM 1 TABLET: 875; 125 TABLET, FILM COATED ORAL at 21:37

## 2018-10-07 RX ADMIN — NYSTATIN 500000 UNITS: 100000 SUSPENSION ORAL at 21:37

## 2018-10-07 RX ADMIN — Medication 10 ML: at 08:53

## 2018-10-07 RX ADMIN — POTASSIUM CHLORIDE 10 MEQ: 10 INJECTION, SOLUTION INTRAVENOUS at 14:18

## 2018-10-07 RX ADMIN — PRAVASTATIN SODIUM 40 MG: 40 TABLET ORAL at 21:38

## 2018-10-07 RX ADMIN — POTASSIUM CHLORIDE 30 MEQ: 750 TABLET, FILM COATED, EXTENDED RELEASE ORAL at 16:34

## 2018-10-07 RX ADMIN — DIGOXIN 0.06 MG: 125 TABLET ORAL at 08:52

## 2018-10-07 RX ADMIN — APIXABAN 5 MG: 5 TABLET, FILM COATED ORAL at 21:37

## 2018-10-07 RX ADMIN — APIXABAN 5 MG: 5 TABLET, FILM COATED ORAL at 12:44

## 2018-10-07 RX ADMIN — AMOXICILLIN AND CLAVULANATE POTASSIUM 1 TABLET: 875; 125 TABLET, FILM COATED ORAL at 08:52

## 2018-10-07 RX ADMIN — GLIMEPIRIDE 2 MG: 2 TABLET ORAL at 08:52

## 2018-10-07 RX ADMIN — POTASSIUM CHLORIDE 10 MEQ: 10 INJECTION, SOLUTION INTRAVENOUS at 12:46

## 2018-10-07 RX ADMIN — INSULIN LISPRO 1 UNITS: 100 INJECTION, SOLUTION INTRAVENOUS; SUBCUTANEOUS at 21:36

## 2018-10-07 RX ADMIN — CARVEDILOL 3.12 MG: 3.12 TABLET, FILM COATED ORAL at 08:52

## 2018-10-07 RX ADMIN — ASPIRIN 325 MG: 325 TABLET, DELAYED RELEASE ORAL at 08:52

## 2018-10-07 RX ADMIN — NYSTATIN 500000 UNITS: 100000 SUSPENSION ORAL at 12:49

## 2018-10-07 RX ADMIN — NYSTATIN 500000 UNITS: 100000 SUSPENSION ORAL at 17:10

## 2018-10-07 RX ADMIN — DOCUSATE SODIUM 100 MG: 100 CAPSULE, LIQUID FILLED ORAL at 08:52

## 2018-10-07 RX ADMIN — MONTELUKAST SODIUM 10 MG: 10 TABLET, FILM COATED ORAL at 21:37

## 2018-10-07 RX ADMIN — TORSEMIDE 20 MG: 20 TABLET ORAL at 08:52

## 2018-10-07 RX ADMIN — CYANOCOBALAMIN TAB 1000 MCG 500 MCG: 1000 TAB at 12:44

## 2018-10-07 RX ADMIN — POLYETHYLENE GLYCOL 3350 17 G: 17 POWDER, FOR SOLUTION ORAL at 08:51

## 2018-10-07 RX ADMIN — ACETAMINOPHEN 650 MG: 325 TABLET, FILM COATED ORAL at 12:54

## 2018-10-07 RX ADMIN — INSULIN LISPRO 1 UNITS: 100 INJECTION, SOLUTION INTRAVENOUS; SUBCUTANEOUS at 17:12

## 2018-10-07 RX ADMIN — FERROUS SULFATE TAB 325 MG (65 MG ELEMENTAL FE) 325 MG: 325 (65 FE) TAB at 17:10

## 2018-10-07 RX ADMIN — Medication 10 ML: at 21:38

## 2018-10-07 RX ADMIN — Medication 1 CAPSULE: at 17:10

## 2018-10-07 ASSESSMENT — PAIN SCALES - GENERAL
PAINLEVEL_OUTOF10: 4
PAINLEVEL_OUTOF10: 0

## 2018-10-07 NOTE — PROGRESS NOTES
10/07/18 0852    digoxin (LANOXIN) tablet 0.0625 mg  0.0625 mg Oral Daily Adriel Hong MD   0.0625 mg at 10/07/18 0852    pravastatin (PRAVACHOL) tablet 40 mg  40 mg Oral Nightly Adriel Hong MD   40 mg at 10/06/18 2157    sodium chloride flush 0.9 % injection 10 mL  10 mL Intravenous 2 times per day Jaclyn Grimaldo MD   10 mL at 10/07/18 0853    sodium chloride flush 0.9 % injection 10 mL  10 mL Intravenous PRN Adriel Hong MD   10 mL at 10/06/18 0226    ondansetron (ZOFRAN) injection 4 mg  4 mg Intravenous Q6H PRN Jaclyn Grimaldo MD   4 mg at 10/06/18 0226    potassium chloride 10 mEq/100 mL IVPB (Peripheral Line)  10 mEq Intravenous PRN Jaclyn Grimaldo  mL/hr at 10/07/18 1246 10 mEq at 10/07/18 1246    magnesium sulfate 1 g in dextrose 5% 100 mL IVPB  1 g Intravenous PRN Adriel Hong MD        glucose (GLUTOSE) 40 % oral gel 15 g  15 g Oral PRN Adriel Hong MD        dextrose 50 % solution 12.5 g  12.5 g Intravenous PRN Adriel Hong MD        glucagon (rDNA) injection 1 mg  1 mg Intramuscular PRN Adriel Hong MD        dextrose 5 % solution  100 mL/hr Intravenous PRN Adriel Hong MD        montelukast (SINGULAIR) tablet 10 mg  10 mg Oral Nightly Dauna Flatness, APRN - CNP   10 mg at 10/06/18 2157    ondansetron (ZOFRAN) injection 4 mg  4 mg Intravenous Once [place], [place]   Stopped at 10/01/18 5047     Allergies   Allergen Reactions    Iieexuwp-Wyezbby-Nzseyi [Fluocinolone] Shortness Of Breath    Ciprofloxacin Shortness Of Breath    Diovan [Valsartan] Shortness Of Breath    Flagyl [Metronidazole] Shortness Of Breath     Has taken diflucan at home 12/7/15    Metformin And Related [Metformin And Related] Shortness Of Breath    Benazepril      Other reaction(s): Not Recorded    Morphine      Bad reaction. \"makes her feel horrible\".      Saxagliptin      Other reaction(s): Not Recorded    Levaquin [Levofloxacin] Rash     Active Problems:    Splenic infarct    Abdominal pain, right upper quadrant    Acute colitis    Infection of superficial incisional surgical site after procedure    Obesity, Class I, BMI 30-34.9    Diabetes education, encounter for  Resolved Problems:    * No resolved hospital problems. *    Blood pressure 121/67, pulse 68, temperature 97.1 °F (36.2 °C), temperature source Oral, resp. rate 16, height 5' 8\" (1.727 m), weight 222 lb 9.6 oz (101 kg), SpO2 94 %. Subjective:  Symptoms:  Stable. Diet:  Adequate intake. Activity level: Normal.    Pain:  She complains of pain that is mild. Objective:  General Appearance:  Comfortable. Vital signs: (most recent): Blood pressure 121/67, pulse 68, temperature 97.1 °F (36.2 °C), temperature source Oral, resp. rate 16, height 5' 8\" (1.727 m), weight 222 lb 9.6 oz (101 kg), SpO2 94 %. Output: Producing urine. Lungs:  Normal effort and normal respiratory rate. Abdomen: Abdomen is soft and non-distended. (Mild RUQ tenderness). Assessment & Plan 71year old female seen in follow up for colitis or the proximal transverse colon. Doing well. She has only minimal residual RUQ tenderness. Tolerating a general diet. Plan is for discharge home tomorrow.     Alisson Blunt MD  10/7/2018

## 2018-10-08 LAB
ANION GAP SERPL CALCULATED.3IONS-SCNC: 12 MMOL/L (ref 3–16)
BLOOD CULTURE, ROUTINE: NORMAL
BUN BLDV-MCNC: 9 MG/DL (ref 7–20)
CALCIUM SERPL-MCNC: 8.8 MG/DL (ref 8.3–10.6)
CHLORIDE BLD-SCNC: 107 MMOL/L (ref 99–110)
CO2: 26 MMOL/L (ref 21–32)
CREAT SERPL-MCNC: 1.4 MG/DL (ref 0.6–1.2)
GFR AFRICAN AMERICAN: 45
GFR NON-AFRICAN AMERICAN: 37
GLUCOSE BLD-MCNC: 103 MG/DL (ref 70–99)
GLUCOSE BLD-MCNC: 122 MG/DL (ref 70–99)
GLUCOSE BLD-MCNC: 154 MG/DL (ref 70–99)
GLUCOSE BLD-MCNC: 185 MG/DL (ref 70–99)
GLUCOSE BLD-MCNC: 87 MG/DL (ref 70–99)
HCT VFR BLD CALC: 24.4 % (ref 36–48)
HEMOGLOBIN: 7.9 G/DL (ref 12–16)
INR BLD: 1.82 (ref 0.86–1.14)
MAGNESIUM: 2.1 MG/DL (ref 1.8–2.4)
MCH RBC QN AUTO: 29.1 PG (ref 26–34)
MCHC RBC AUTO-ENTMCNC: 32.4 G/DL (ref 31–36)
MCV RBC AUTO: 89.6 FL (ref 80–100)
OCCULT BLOOD DIAGNOSTIC: NORMAL
PDW BLD-RTO: 16 % (ref 12.4–15.4)
PERFORMED ON: ABNORMAL
PERFORMED ON: NORMAL
PLATELET # BLD: 273 K/UL (ref 135–450)
PMV BLD AUTO: 7.6 FL (ref 5–10.5)
POTASSIUM REFLEX MAGNESIUM: 3.4 MMOL/L (ref 3.5–5.1)
PROTHROMBIN TIME: 20.8 SEC (ref 9.8–13)
RBC # BLD: 2.72 M/UL (ref 4–5.2)
SODIUM BLD-SCNC: 145 MMOL/L (ref 136–145)
WBC # BLD: 5.6 K/UL (ref 4–11)

## 2018-10-08 PROCEDURE — APPNB30 APP NON BILLABLE TIME 0-30 MINS: Performed by: NURSE PRACTITIONER

## 2018-10-08 PROCEDURE — 2060000000 HC ICU INTERMEDIATE R&B

## 2018-10-08 PROCEDURE — 6370000000 HC RX 637 (ALT 250 FOR IP): Performed by: INTERNAL MEDICINE

## 2018-10-08 PROCEDURE — 85610 PROTHROMBIN TIME: CPT

## 2018-10-08 PROCEDURE — 83735 ASSAY OF MAGNESIUM: CPT

## 2018-10-08 PROCEDURE — 2580000003 HC RX 258: Performed by: INTERNAL MEDICINE

## 2018-10-08 PROCEDURE — APPSS15 APP SPLIT SHARED TIME 0-15 MINUTES: Performed by: NURSE PRACTITIONER

## 2018-10-08 PROCEDURE — 6370000000 HC RX 637 (ALT 250 FOR IP): Performed by: SURGERY

## 2018-10-08 PROCEDURE — 99232 SBSQ HOSP IP/OBS MODERATE 35: CPT | Performed by: INTERNAL MEDICINE

## 2018-10-08 PROCEDURE — 99231 SBSQ HOSP IP/OBS SF/LOW 25: CPT | Performed by: SURGERY

## 2018-10-08 PROCEDURE — 6370000000 HC RX 637 (ALT 250 FOR IP): Performed by: HOSPITALIST

## 2018-10-08 PROCEDURE — 85027 COMPLETE CBC AUTOMATED: CPT

## 2018-10-08 PROCEDURE — 6370000000 HC RX 637 (ALT 250 FOR IP): Performed by: NURSE PRACTITIONER

## 2018-10-08 PROCEDURE — 36415 COLL VENOUS BLD VENIPUNCTURE: CPT

## 2018-10-08 PROCEDURE — 80048 BASIC METABOLIC PNL TOTAL CA: CPT

## 2018-10-08 RX ORDER — LIDOCAINE 50 MG/G
1 PATCH TOPICAL DAILY
Status: DISCONTINUED | OUTPATIENT
Start: 2018-10-08 | End: 2018-10-12 | Stop reason: HOSPADM

## 2018-10-08 RX ORDER — OXYCODONE HYDROCHLORIDE 5 MG/1
5 TABLET ORAL ONCE
Status: COMPLETED | OUTPATIENT
Start: 2018-10-08 | End: 2018-10-08

## 2018-10-08 RX ORDER — POTASSIUM CHLORIDE 750 MG/1
30 TABLET, FILM COATED, EXTENDED RELEASE ORAL 2 TIMES DAILY
Status: COMPLETED | OUTPATIENT
Start: 2018-10-08 | End: 2018-10-09

## 2018-10-08 RX ADMIN — INSULIN LISPRO 1 UNITS: 100 INJECTION, SOLUTION INTRAVENOUS; SUBCUTANEOUS at 11:51

## 2018-10-08 RX ADMIN — Medication 10 ML: at 08:54

## 2018-10-08 RX ADMIN — CARVEDILOL 3.12 MG: 3.12 TABLET, FILM COATED ORAL at 08:54

## 2018-10-08 RX ADMIN — Medication 10 ML: at 21:24

## 2018-10-08 RX ADMIN — GLIMEPIRIDE 1 MG: 2 TABLET ORAL at 11:51

## 2018-10-08 RX ADMIN — AMOXICILLIN AND CLAVULANATE POTASSIUM 1 TABLET: 875; 125 TABLET, FILM COATED ORAL at 21:22

## 2018-10-08 RX ADMIN — DIGOXIN 0.06 MG: 125 TABLET ORAL at 08:52

## 2018-10-08 RX ADMIN — MONTELUKAST SODIUM 10 MG: 10 TABLET, FILM COATED ORAL at 21:22

## 2018-10-08 RX ADMIN — NYSTATIN 500000 UNITS: 100000 SUSPENSION ORAL at 17:12

## 2018-10-08 RX ADMIN — ASPIRIN 325 MG: 325 TABLET, DELAYED RELEASE ORAL at 08:52

## 2018-10-08 RX ADMIN — POTASSIUM CHLORIDE 30 MEQ: 750 TABLET, FILM COATED, EXTENDED RELEASE ORAL at 21:23

## 2018-10-08 RX ADMIN — CYANOCOBALAMIN TAB 1000 MCG 500 MCG: 1000 TAB at 08:51

## 2018-10-08 RX ADMIN — Medication 1 CAPSULE: at 08:50

## 2018-10-08 RX ADMIN — AMOXICILLIN AND CLAVULANATE POTASSIUM 1 TABLET: 875; 125 TABLET, FILM COATED ORAL at 08:54

## 2018-10-08 RX ADMIN — CARVEDILOL 3.12 MG: 3.12 TABLET, FILM COATED ORAL at 17:11

## 2018-10-08 RX ADMIN — OXYCODONE HYDROCHLORIDE 5 MG: 5 TABLET ORAL at 21:22

## 2018-10-08 RX ADMIN — Medication 1 CAPSULE: at 17:12

## 2018-10-08 RX ADMIN — FERROUS SULFATE TAB 325 MG (65 MG ELEMENTAL FE) 325 MG: 325 (65 FE) TAB at 08:51

## 2018-10-08 RX ADMIN — POLYETHYLENE GLYCOL 3350 17 G: 17 POWDER, FOR SOLUTION ORAL at 08:54

## 2018-10-08 RX ADMIN — OXYCODONE HYDROCHLORIDE 5 MG: 5 TABLET ORAL at 17:11

## 2018-10-08 RX ADMIN — APIXABAN 5 MG: 5 TABLET, FILM COATED ORAL at 08:51

## 2018-10-08 RX ADMIN — ACETAMINOPHEN 650 MG: 325 TABLET, FILM COATED ORAL at 19:29

## 2018-10-08 RX ADMIN — FERROUS SULFATE TAB 325 MG (65 MG ELEMENTAL FE) 325 MG: 325 (65 FE) TAB at 17:11

## 2018-10-08 RX ADMIN — NYSTATIN 500000 UNITS: 100000 SUSPENSION ORAL at 11:51

## 2018-10-08 RX ADMIN — TORSEMIDE 20 MG: 20 TABLET ORAL at 08:52

## 2018-10-08 RX ADMIN — OXYCODONE HYDROCHLORIDE 5 MG: 5 TABLET ORAL at 00:32

## 2018-10-08 RX ADMIN — PRAVASTATIN SODIUM 40 MG: 40 TABLET ORAL at 21:23

## 2018-10-08 RX ADMIN — NYSTATIN 500000 UNITS: 100000 SUSPENSION ORAL at 08:52

## 2018-10-08 RX ADMIN — POTASSIUM CHLORIDE 30 MEQ: 750 TABLET, FILM COATED, EXTENDED RELEASE ORAL at 08:52

## 2018-10-08 RX ADMIN — INSULIN LISPRO 1 UNITS: 100 INJECTION, SOLUTION INTRAVENOUS; SUBCUTANEOUS at 17:11

## 2018-10-08 RX ADMIN — OXYCODONE HYDROCHLORIDE 5 MG: 5 TABLET ORAL at 22:38

## 2018-10-08 ASSESSMENT — ENCOUNTER SYMPTOMS
EYE REDNESS: 0
SORE THROAT: 0
DIARRHEA: 0
CONSTIPATION: 0
HEMOPTYSIS: 0
WHEEZING: 0
BACK PAIN: 0
EYE DISCHARGE: 0
BLURRED VISION: 0
NAUSEA: 0
SPUTUM PRODUCTION: 0
SHORTNESS OF BREATH: 0
COUGH: 0
DOUBLE VISION: 0
ABDOMINAL PAIN: 0

## 2018-10-08 ASSESSMENT — PAIN DESCRIPTION - PAIN TYPE
TYPE: CHRONIC PAIN

## 2018-10-08 ASSESSMENT — PAIN DESCRIPTION - LOCATION
LOCATION: BACK;GENERALIZED
LOCATION: BACK
LOCATION: GENERALIZED

## 2018-10-08 ASSESSMENT — PAIN SCALES - GENERAL
PAINLEVEL_OUTOF10: 10
PAINLEVEL_OUTOF10: 7
PAINLEVEL_OUTOF10: 10
PAINLEVEL_OUTOF10: 6
PAINLEVEL_OUTOF10: 10
PAINLEVEL_OUTOF10: 5
PAINLEVEL_OUTOF10: 10
PAINLEVEL_OUTOF10: 10
PAINLEVEL_OUTOF10: 4

## 2018-10-08 ASSESSMENT — PAIN DESCRIPTION - ORIENTATION: ORIENTATION: LOWER

## 2018-10-08 ASSESSMENT — PAIN DESCRIPTION - DESCRIPTORS: DESCRIPTORS: SORE;ACHING

## 2018-10-08 NOTE — PLAN OF CARE
Problem: Falls - Risk of: Intervention: Assess risk factors for falls  Pt currently low fall risk, non-skid socks on, pt encouraged to call for assistance as needed. Problem: Pain:  Goal: Control of chronic pain  Control of chronic pain   Outcome: Ongoing  Rating pain 4/10, declines pain medication at this time. Problem: Safety:  Intervention: Provide a safe environment  Pt currently up to chair, room remains free of clutter. Problem: Skin Integrity:  Intervention: Provide skin care  Incision remains intact at this time to left groin. Dressing in place.

## 2018-10-08 NOTE — PROGRESS NOTES
Oncology and Hematology Care   Progress Note    10/8/2018    SUBJECTIVE:      Feels OK. Abdominal pain better. Having hemorrhoidal bleeding  No N or V    OBJECTIVE:    Physical Assessment:  Vitals:  /67   Pulse 78   Temp 98.2 °F (36.8 °C) (Temporal)   Resp 16   Ht 5' 8\" (1.727 m)   Wt 223 lb 1.6 oz (101.2 kg)   SpO2 97%   BMI 33.92 kg/m²    24HR INTAKE/OUTPUT:      Intake/Output Summary (Last 24 hours) at 10/08/18 1250  Last data filed at 10/08/18 0849   Gross per 24 hour   Intake              240 ml   Output                0 ml   Net              240 ml       Conscious alert oriented  HEENT: + Pallor   Neck: Supple. No lymphadenopathy  Lungs: CTA. Respiratory efforts normal.  CVS: S1S2 normal. No murmurs or gallops. Abdomen: Soft BS +. No organomegaly. Extremities: minimal edema +  Neuro: No focal deficits. Skin: No Rash Petechiae    Labs Results:    CBC:   Recent Labs      10/06/18   0548  10/07/18   0830  10/08/18   0501   WBC  6.1  5.7  5.6   HGB  8.7*  8.0*  7.9*   HCT  27.0*  24.5*  24.4*   MCV  89.3  89.3  89.6   PLT  312  295  273     BMP:   Recent Labs      10/06/18   0548  10/07/18   0830  10/07/18   1815  10/08/18   0501   NA  141  142   --   145   K  3.8  3.3*  3.7  3.4*   CL  105  105   --   107   CO2  24  28   --   26   BUN  11  8   --   9   CREATININE  1.2  1.3*   --   1.4*     LIVER PROFILE:   No results for input(s): AST, ALT, LIPASE, BILIDIR, BILITOT, ALKPHOS in the last 72 hours. Invalid input(s):   AMYLASE,  ALB  PT/INR:    Lab Results   Component Value Date    PROTIME 20.8 10/08/2018    PROTIME 17.2 10/07/2018    PROTIME 16.9 10/06/2018    INR 1.82 10/08/2018    INR 1.51 10/07/2018    INR 1.48 10/06/2018     PTT:    Lab Results   Component Value Date    APTT 77.8 10/07/2018    APTT 56.0 10/06/2018    APTT 78.1 10/05/2018     UA:  No results for input(s): NITRITE, COLORU, PHUR, LABCAST, WBCUA, RBCUA, MUCUS, TRICHOMONAS, YEAST, BACTERIA, CLARITYU, SPECGRAV, LEUKOCYTESUR, Javi Murillo MD, 10/8/2018,

## 2018-10-08 NOTE — PROGRESS NOTES
Nutrition Assessment    Type and Reason for Visit: Initial    Malnutrition Assessment:  · Malnutrition Status: No malnutrition    Nutrition Diagnosis:   · Problem: No nutrition diagnosis at this time    Nutrition Assessment:  · Subjective Assessment: Day 7 LOS assessment. Pt on carb controlled diet. Eating well. Pt has no nutrition questions or concerns at this time. Nutrition Risk Level   Risk Level: Low    Nutrition Intervention  Food and/or Delivery: Continue current diet  Nutrition Education/Counseling/Coordination of Care:  Continued Inpatient Monitoring, Education Not Indicated    Patient assessed for nutrition risk. Deemed to be at low risk at this time. Will continue to follow patient.       Electronically signed by Debbi Anderson on 10/8/18 at 2:48 PM    Contact Number: [phone]

## 2018-10-08 NOTE — PROGRESS NOTES
Irregular heart beat     Other specified gastritis without mention of hemorrhage     Palpitations     Skin cancer     basal and squamous    Type II or unspecified type diabetes mellitus without mention of complication, not stated as uncontrolled        Past Surgical History: All past surgical history was reviewed today. Past Surgical History:   Procedure Laterality Date    BRONCHOSCOPY  07/18/2016    Dr. Rupali Wilde - brushings from [address] [place] [address],[id number]  08/16/2018    Dr. Trev Vang  02/07/2017    Dr. Melody Hanna - sigmoid diverticulosis, polypectomies x3    COLONOSCOPY  01/17/2014    Dr. Melody Hanna - sigmoid diverticulosis, polypectomies x3, internal hemorrhoids    CORONARY ARTERY BYPASS GRAFT  08/21/2018    Dr. Rashmi Cleary - x3 (LIMA-LAD, L SV-D1-PLV) modified BL MAZE procedure w/obliteration of WAYNE using 45mm [place] Left 08/16/2018    Dr. Christophe Diaz - Simi Never SN# [id number] Medtronic    MITRAL VALVE REPLACEMENT  08/21/2018    Dr. Rashmi Cleary - 27mm Medtronic Cinch tissue valve    SKIN BIOPSY      TRANSESOPHAGEAL ECHOCARDIOGRAM  08/21/2018    during CABG/MVR    TUNNELED VENOUS CATHETER PLACEMENT Left 08/23/2018    Dr. John Brenner -  for HD         Immunization History: All immunization history was reviewed by me today. Immunization History   Administered Date(s) Administered    Influenza Virus Vaccine 09/26/2012, 12/15/2014, 12/16/2015    Influenza, Ivett Starring, 3 Years and older, IM (Fluzone 3 yrs and older or Afluria 5 yrs and older) 12/27/2016    Pneumococcal 13-valent Conjugate ([id number]) 04/15/2015    Pneumococcal Polysaccharide ([id number]) 04/28/2016       Family History: All family history was reviewed today.       Objective:       PHYSICAL EXAM:      Vitals:   Vitals:    10/08/18 0500 10/08/18 0726 10/08/18 0849 10/08/18 1147   BP: 124/71  (!) 102/58 112/67   Pulse: 87  80 78 Management:    · Continue to monitor i.v access sites for erythema, induration, discharge or tenderness. · As always, continue efforts to minimize tubes/lines/drains as clinically appropriate to reduce chances of line associated infections. Patient education and counseling:        · The patient was educated in detail about the side-effects of various antibiotics and things to watch for like new rashes, lip swelling, severe reaction, worsening diarrhea, break through fever etc.  · Discussed patient's condition and what to expect. All of the patient's questions were addressed in a satisfactory manner and patient verbalized understanding all instructions. Please note that this chart was generated using Dragon dictation software. Although every effort was made to ensure the accuracy of this automated transcription, some errors in transcription may have occurred inadvertently. If you may need any clarification, please do not hesitate to contact me through EPIC or at the phone number provided below with my electronic signature. Thanks for allowing me to participate in your patient's care.  I will sign off today, but will be available to answer any further questions or concerns that may arise during patient's stay in the hospital.    Veena Little MD, MPH  10/8/2018, 1:06 PM  [facility] Infectious Disease   Office: [phone]  Fax: [phone]  Tuesday AM clinic:   [address] [facility], [address]  Thursday AM clinic: [address] [facility]

## 2018-10-08 NOTE — PROGRESS NOTES
when compared to the CT from 07/30/2018. This lesion measured approximately 6.3 cm on the exam from 11/19/2015. .  The urinary bladder is unremarkable. Bones/soft tissues: Visualized osseous structures are mildly demineralized. There is advanced multilevel degenerative disc disease most evident at L1-L2 and L5-S1. There are hypertrophic degenerative changes of the facet joints throughout the lumbar spine. No suspicious osseous lesions are identified. 1. Mucosal thickening in haziness of the fat associated with the proximal transverse colon is most suggestive of nonspecific infectious or inflammatory colitis. 2. New irregular wedge-shaped heterogeneous hypodensity involving the superomedial spleen. Findings are concerning for splenic infarction. 3. Stable appearance of cystic lesion involving the left adnexa measuring up to 7.8 cm, which has shown interval growth over time. Given size, MRI with IV contrast or surgical evaluation is suggested. Xr Chest Portable    Result Date: 10/1/2018  EXAMINATION: SINGLE XRAY VIEW OF THE CHEST 10/1/2018 4:16 pm COMPARISON: 08/26/2018 HISTORY: ORDERING SYSTEM PROVIDED HISTORY: SOB TECHNOLOGIST PROVIDED HISTORY: Reason for exam:->SOB Ordering Physician Provided Reason for Exam: sob Acuity: Unknown Type of Exam: Unknown FINDINGS: Cardial-pericardial silhouette is enlarged but stable. No definite acute consolidation identified, but the retrocardiac area is not well evaluated. Right lung is grossly clear. No pneumothorax. No free air. No definite acute abnormality detected. Limited by patient's body habitus, portable technique, and by cardiomegaly.        Scheduled Meds:   potassium chloride  30 mEq Oral BID    apixaban  5 mg Oral BID    glimepiride  1 mg Oral Daily before lunch    ferrous sulfate  325 mg Oral BID     vitamin B-12  500 mcg Oral Daily    torsemide  20 mg Oral Daily    polyethylene glycol  17 g Oral Daily    amoxicillin-clavulanate  1 tablet

## 2018-10-08 NOTE — CONSULTS
uncontrolled        Past Surgical History:    Past Surgical History:   Procedure Laterality Date    BRONCHOSCOPY  07/18/2016    Dr. Rupali Wilde - brushings from [address] [place] [address],[id number]  08/16/2018    Dr. Trev Vang  02/07/2017    Dr. Melody Hanna - sigmoid diverticulosis, polypectomies x3    COLONOSCOPY  01/17/2014    Dr. Melody Hanna - sigmoid diverticulosis, polypectomies x3, internal hemorrhoids    CORONARY ARTERY BYPASS GRAFT  08/21/2018    Dr. Rashmi Cleary - x3 (LIMA-LAD, L SV-D1-PLV) modified BL MAZE procedure w/obliteration of WAYNE using 45mm AtriClip   [facility] Left 08/16/2018    Dr. Christophe Diaz - Jenetta Never SN# [id number] Med[employer]onic    MITRAL VALVE REPLACEMENT  08/21/2018    Dr. Rashmi Cleary - 27mm Medtronic Cinch tissue valve    SKIN BIOPSY      TRANSESOPHAGEAL ECHOCARDIOGRAM  08/21/2018    during CABG/MVR    TUNNELED VENOUS CATHETER PLACEMENT Left 08/23/2018    Dr. John Brenner -  for HD       Social History:     · Tobacco use:   reports that she has never smoked. She has never used smokeless tobacco.  · Alcohol use:   reports that she does not drink alcohol. · Currently lives in: [facility]  ·  reports that she does not use drugs.        Family History:   Family History   Problem Relation Age of Onset   Lucia Petrin Cancer Father     Asthma Mother     Hypertension Mother     Heart Disease Mother     High Blood Pressure Mother      Medications:    potassium chloride  30 mEq Oral BID    glimepiride  1 mg Oral Daily before lunch    ferrous sulfate  325 mg Oral BID WC    vitamin B-12  500 mcg Oral Daily    torsemide  20 mg Oral Daily    polyethylene glycol  17 g Oral Daily    amoxicillin-clavulanate  1 tablet Oral 2 times per day    lactobacillus  1 capsule Oral BID WC    insulin lispro  0-6 Units Subcutaneous TID WC    insulin lispro  0-3 Units Subcutaneous Nightly    nystatin  5 mL Oral 4x Daily    aspirin  Levaquin [Levofloxacin] Rash           Assessment:   The patient is a 71 y.o. old female  with following problems:    Active Problems:    Splenic infarct    Abdominal pain, right upper quadrant    Acute colitis    Infection of superficial incisional surgical site after procedure    Obesity, Class I, BMI 30-34.9    Diabetes education, encounter for  Resolved Problems:    * No resolved hospital problems. *    Rectal bleeding while on Eliquis. She has chronic anemia. She would also require long term anticoagulation. CAD s/p CABG and valvular replacement 8/2018  DM   Hypettension       Recommendations:   Full liquid diet   Will plan for EGD and colonoscopy on Wednesday. I prefer patient be off Eliquis for at least 2 days.   She can have aspirin for these endoscopic procedures    Gianna Pineda MD, [address]

## 2018-10-09 LAB
ANION GAP SERPL CALCULATED.3IONS-SCNC: 12 MMOL/L (ref 3–16)
BUN BLDV-MCNC: 11 MG/DL (ref 7–20)
CALCIUM SERPL-MCNC: 9 MG/DL (ref 8.3–10.6)
CHLORIDE BLD-SCNC: 104 MMOL/L (ref 99–110)
CO2: 25 MMOL/L (ref 21–32)
CREAT SERPL-MCNC: 1.3 MG/DL (ref 0.6–1.2)
GFR AFRICAN AMERICAN: 49
GFR NON-AFRICAN AMERICAN: 40
GLUCOSE BLD-MCNC: 103 MG/DL (ref 70–99)
GLUCOSE BLD-MCNC: 113 MG/DL (ref 70–99)
GLUCOSE BLD-MCNC: 137 MG/DL (ref 70–99)
GLUCOSE BLD-MCNC: 140 MG/DL (ref 70–99)
GLUCOSE BLD-MCNC: 65 MG/DL (ref 70–99)
GLUCOSE BLD-MCNC: 66 MG/DL (ref 70–99)
HCT VFR BLD CALC: 26.6 % (ref 36–48)
HEMOGLOBIN: 8.5 G/DL (ref 12–16)
INR BLD: 1.5 (ref 0.86–1.14)
MCH RBC QN AUTO: 29 PG (ref 26–34)
MCHC RBC AUTO-ENTMCNC: 32 G/DL (ref 31–36)
MCV RBC AUTO: 90.7 FL (ref 80–100)
PDW BLD-RTO: 16.5 % (ref 12.4–15.4)
PERFORMED ON: ABNORMAL
PLATELET # BLD: 278 K/UL (ref 135–450)
PMV BLD AUTO: 7.6 FL (ref 5–10.5)
POTASSIUM REFLEX MAGNESIUM: 3.7 MMOL/L (ref 3.5–5.1)
PROTHROMBIN TIME: 17.1 SEC (ref 9.8–13)
RBC # BLD: 2.93 M/UL (ref 4–5.2)
SODIUM BLD-SCNC: 141 MMOL/L (ref 136–145)
WBC # BLD: 5.7 K/UL (ref 4–11)

## 2018-10-09 PROCEDURE — 85610 PROTHROMBIN TIME: CPT

## 2018-10-09 PROCEDURE — 6370000000 HC RX 637 (ALT 250 FOR IP): Performed by: INTERNAL MEDICINE

## 2018-10-09 PROCEDURE — 6370000000 HC RX 637 (ALT 250 FOR IP): Performed by: HOSPITALIST

## 2018-10-09 PROCEDURE — 36415 COLL VENOUS BLD VENIPUNCTURE: CPT

## 2018-10-09 PROCEDURE — 2060000000 HC ICU INTERMEDIATE R&B

## 2018-10-09 PROCEDURE — 6370000000 HC RX 637 (ALT 250 FOR IP): Performed by: NURSE PRACTITIONER

## 2018-10-09 PROCEDURE — 99231 SBSQ HOSP IP/OBS SF/LOW 25: CPT | Performed by: SURGERY

## 2018-10-09 PROCEDURE — 6360000002 HC RX W HCPCS: Performed by: INTERNAL MEDICINE

## 2018-10-09 PROCEDURE — 2580000003 HC RX 258: Performed by: INTERNAL MEDICINE

## 2018-10-09 PROCEDURE — 80048 BASIC METABOLIC PNL TOTAL CA: CPT

## 2018-10-09 PROCEDURE — 6370000000 HC RX 637 (ALT 250 FOR IP): Performed by: SURGERY

## 2018-10-09 PROCEDURE — 85027 COMPLETE CBC AUTOMATED: CPT

## 2018-10-09 RX ADMIN — Medication 1 CAPSULE: at 09:36

## 2018-10-09 RX ADMIN — GLIMEPIRIDE 1 MG: 2 TABLET ORAL at 12:06

## 2018-10-09 RX ADMIN — NYSTATIN 500000 UNITS: 100000 SUSPENSION ORAL at 12:07

## 2018-10-09 RX ADMIN — CARVEDILOL 3.12 MG: 3.12 TABLET, FILM COATED ORAL at 09:36

## 2018-10-09 RX ADMIN — CYANOCOBALAMIN TAB 1000 MCG 500 MCG: 1000 TAB at 09:37

## 2018-10-09 RX ADMIN — ASPIRIN 325 MG: 325 TABLET, DELAYED RELEASE ORAL at 09:36

## 2018-10-09 RX ADMIN — FERROUS SULFATE TAB 325 MG (65 MG ELEMENTAL FE) 325 MG: 325 (65 FE) TAB at 17:08

## 2018-10-09 RX ADMIN — AMOXICILLIN AND CLAVULANATE POTASSIUM 1 TABLET: 875; 125 TABLET, FILM COATED ORAL at 09:36

## 2018-10-09 RX ADMIN — Medication 10 ML: at 09:37

## 2018-10-09 RX ADMIN — Medication 10 ML: at 20:25

## 2018-10-09 RX ADMIN — CARVEDILOL 3.12 MG: 3.12 TABLET, FILM COATED ORAL at 17:07

## 2018-10-09 RX ADMIN — MONTELUKAST SODIUM 10 MG: 10 TABLET, FILM COATED ORAL at 20:25

## 2018-10-09 RX ADMIN — PRAVASTATIN SODIUM 40 MG: 40 TABLET ORAL at 20:25

## 2018-10-09 RX ADMIN — NYSTATIN 500000 UNITS: 100000 SUSPENSION ORAL at 20:25

## 2018-10-09 RX ADMIN — POLYETHYLENE GLYCOL-3350 AND ELECTROLYTES 4000 ML: 236; 6.74; 5.86; 2.97; 22.74 POWDER, FOR SOLUTION ORAL at 17:14

## 2018-10-09 RX ADMIN — FERROUS SULFATE TAB 325 MG (65 MG ELEMENTAL FE) 325 MG: 325 (65 FE) TAB at 09:36

## 2018-10-09 RX ADMIN — POTASSIUM CHLORIDE 30 MEQ: 750 TABLET, FILM COATED, EXTENDED RELEASE ORAL at 09:36

## 2018-10-09 RX ADMIN — Medication 1 CAPSULE: at 17:08

## 2018-10-09 RX ADMIN — ONDANSETRON 4 MG: 2 INJECTION INTRAMUSCULAR; INTRAVENOUS at 23:09

## 2018-10-09 RX ADMIN — NYSTATIN 500000 UNITS: 100000 SUSPENSION ORAL at 17:08

## 2018-10-09 RX ADMIN — ACETAMINOPHEN 650 MG: 325 TABLET, FILM COATED ORAL at 13:26

## 2018-10-09 RX ADMIN — OXYCODONE HYDROCHLORIDE 5 MG: 5 TABLET ORAL at 13:26

## 2018-10-09 RX ADMIN — OXYCODONE HYDROCHLORIDE 5 MG: 5 TABLET ORAL at 23:28

## 2018-10-09 RX ADMIN — DIGOXIN 0.06 MG: 125 TABLET ORAL at 09:36

## 2018-10-09 RX ADMIN — NYSTATIN 500000 UNITS: 100000 SUSPENSION ORAL at 09:36

## 2018-10-09 RX ADMIN — TORSEMIDE 20 MG: 20 TABLET ORAL at 09:36

## 2018-10-09 RX ADMIN — AMOXICILLIN AND CLAVULANATE POTASSIUM 1 TABLET: 875; 125 TABLET, FILM COATED ORAL at 20:25

## 2018-10-09 ASSESSMENT — PAIN SCALES - GENERAL
PAINLEVEL_OUTOF10: 0
PAINLEVEL_OUTOF10: 5
PAINLEVEL_OUTOF10: 7
PAINLEVEL_OUTOF10: 2
PAINLEVEL_OUTOF10: 6
PAINLEVEL_OUTOF10: 0

## 2018-10-09 ASSESSMENT — PAIN DESCRIPTION - PROGRESSION
CLINICAL_PROGRESSION: GRADUALLY IMPROVING

## 2018-10-09 ASSESSMENT — PAIN DESCRIPTION - DESCRIPTORS: DESCRIPTORS: ACHING

## 2018-10-09 ASSESSMENT — PAIN DESCRIPTION - LOCATION
LOCATION: BACK

## 2018-10-09 ASSESSMENT — PAIN DESCRIPTION - PAIN TYPE
TYPE: CHRONIC PAIN

## 2018-10-09 ASSESSMENT — PAIN DESCRIPTION - ORIENTATION: ORIENTATION: LOWER

## 2018-10-09 NOTE — PROGRESS NOTES
[facility]ar[facility]        Progress Note    Patient Name: Nasim Garcia  MRN: [id number]  YOB: 1945  Date of Evaluation: 10/9/2018    Chief Complaint: Abdominal pain    Subjective:  Painless bright red bleeding overnight  Pain much improved  Tolerating diet    Vital Signs:  Patient Vitals for the past 24 hrs:   BP Temp Temp src Pulse Resp SpO2 Weight   10/09/18 0933 121/68 97.4 °F (36.3 °C) Temporal 72 18 95 % -   10/09/18 0805 - - - - - - 212 lb 1.6 oz (96.2 kg)   10/09/18 0145 116/67 97.4 °F (36.3 °C) Temporal 83 20 93 % -   10/08/18 2236 139/76 98.1 °F (36.7 °C) Temporal 85 18 95 % -   10/08/18 2117 (!) 163/71 97.6 °F (36.4 °C) Temporal 79 18 95 % -   10/08/18 1708 130/88 98.2 °F (36.8 °C) Temporal 87 16 96 % -   10/08/18 1147 112/67 98.2 °F (36.8 °C) Temporal 78 16 97 % -      TEMPERATURE HISTORY 24H: Temp (24hrs), Av.8 °F (36.6 °C), Min:97.4 °F (36.3 °C), Max:98.2 °F (36.8 °C)    BLOOD PRESSURE HISTORY: Systolic (31XHA), FEE:589 , Min:102 , ZZM:807    Diastolic (70MHZ), LVW:95, Min:58, Max:88      Intake/Output:  I/O last 3 completed shifts: In: 1200 [P.O.:1200]  Out: -   No intake/output data recorded.   Drain/tube Output:       Physical Exam:  General: awake, alert, oriented to  person, place, time  Abdomen: soft, non-distended, minimal if any right upper quadrant tenderness to deep palpation only    Labs:  CBC:    Recent Labs      10/07/18   0830  10/08/18   0501  10/09/18   0446   WBC  5.7  5.6  5.7   HGB  8.0*  7.9*  8.5*   HCT  24.5*  24.4*  26.6*   PLT  295  273  278     BMP:    Recent Labs      10/07/18   0830  10/07/18   1815  10/08/18   0501  10/09/18   0446   NA  142   --   145  141   K  3.3*  3.7  3.4*  3.7   CL  105   --   107  104   CO2  28   --   26  25   BUN  8   --   9  11   CREATININE  1.3*   --   1.4*  1.3*   GLUCOSE  83   --   103*  65*     Hepatic:   No results for input(s): AST, ALT, ALB, BILITOT, ALKPHOS in the last 72 0-6 Units Subcutaneous TID WC    insulin lispro  0-3 Units Subcutaneous Nightly    nystatin  5 mL Oral 4x Daily    aspirin  325 mg Oral Daily    carvedilol  3.125 mg Oral BID WC    digoxin  0.0625 mg Oral Daily    pravastatin  40 mg Oral Nightly    sodium chloride flush  10 mL Intravenous 2 times per day    montelukast  10 mg Oral Nightly    ondansetron  4 mg Intravenous Once     Continuous Infusions:   dextrose       PRN Meds:.oxyCODONE **OR** [DISCONTINUED] oxyCODONE, acetaminophen, sodium chloride flush, ondansetron, potassium chloride, magnesium sulfate, glucose, dextrose, glucagon (rDNA), dextrose      Assessment:  71 y.o. female admitted with   1. Abdominal pain, right upper quadrant    2. Colitis    3. Splenic infarct    4. Supratherapeutic INR    5. Elevated troponin      Ms. Ely Pinzon is a 71 y.o. female who presents with   Ischemic vs infectious colitis  Splenic infarction  Atrial fibrillation on coumadin  Supratherapeutic INR  CAD, s/p CABG  Bactremia     Plan:  1. Pain greatly improved  2. Tolerating diet  3. Bowel function returned but with bleeding overnight per rectum, GI to do EGD/colonoscopy tomorrow  4. Antibiotics per ID    Tushar Taveras MD, FACS  10/9/2018  11:06 AM

## 2018-10-09 NOTE — PROGRESS NOTES
[facility]    10/9/2018 3:57 PM        Name: Danny Nettles .               Admitted: 10/1/2018  Primary Care Provider: Siomara Ward MD (Tel: [phone])    Brief Course:    Admitted with abdominal pain, CT finding of transverse colitis and splenic infarct, Abx for colitis,         Subjective: better     Current Medications    polyethylene glycol (GoLYTELY) solution 4,000 mL Once   lidocaine (LIDODERM) 5 % 1 patch Daily   glimepiride (AMARYL) tablet 1 mg Daily before lunch   ferrous sulfate tablet 325 mg BID WC   vitamin B-12 (CYANOCOBALAMIN) tablet 500 mcg Daily   torsemide (DEMADEX) tablet 20 mg Daily   polyethylene glycol (GLYCOLAX) packet 17 g Daily   oxyCODONE (ROXICODONE) immediate release tablet 5 mg Q4H PRN   acetaminophen (TYLENOL) tablet 650 mg Q4H PRN   amoxicillin-clavulanate (AUGMENTIN) 875-125 MG per tablet 1 tablet 2 times per day   lactobacillus (CULTURELLE) capsule 1 capsule BID WC   insulin lispro (HUMALOG) injection pen 0-6 Units TID WC   insulin lispro (HUMALOG) injection pen 0-3 Units Nightly   nystatin (MYCOSTATIN) 758923 UNIT/ML suspension 500,000 Units 4x Daily   aspirin EC tablet 325 mg Daily   carvedilol (COREG) tablet 3.125 mg BID WC   digoxin (LANOXIN) tablet 0.0625 mg Daily   pravastatin (PRAVACHOL) tablet 40 mg Nightly   sodium chloride flush 0.9 % injection 10 mL 2 times per day   sodium chloride flush 0.9 % injection 10 mL PRN   ondansetron (ZOFRAN) injection 4 mg Q6H PRN   potassium chloride 10 mEq/100 mL IVPB (Peripheral Line) PRN   magnesium sulfate 1 g in dextrose 5% 100 mL IVPB PRN   glucose (GLUTOSE) 40 % oral gel 15 g PRN   dextrose 50 % solution 12.5 g PRN   glucagon (rDNA) injection 1 mg PRN   dextrose 5 % solution PRN   montelukast (SINGULAIR) tablet 10 mg Nightly   ondansetron (ZOFRAN) injection 4 mg Once       Objective:  /88   Pulse 73   Temp 97.4 °F (36.3 8/21/18  Essential hypertension ; cont meds  Type 2 diabetes mellitus in obese ; cont meds  Obesity Class 1 due to excess calorie intake : Body mass index is 32.58 kg/m². Lifestyle changes  Disposition: Home with  when stable,  Follow-up, PCP, ID, cardiology, hematology,GI  Discussed with patient   Management /  discharge plan     IV Access:peripheral   Henry: No  Diet: DIET FULL LIQUID;  Diet NPO, After Midnight Exceptions are: Sips with Meds  Code:Full Code  DVT PPX ; noac  Muna Katz MD   10/9/2018 3:57 PM

## 2018-10-10 ENCOUNTER — ANESTHESIA (OUTPATIENT)
Dept: ENDOSCOPY | Age: 73
DRG: 815 | End: 2018-10-10
Payer: MEDICARE

## 2018-10-10 ENCOUNTER — ANESTHESIA EVENT (OUTPATIENT)
Dept: ENDOSCOPY | Age: 73
DRG: 815 | End: 2018-10-10
Payer: MEDICARE

## 2018-10-10 ENCOUNTER — APPOINTMENT (OUTPATIENT)
Dept: GENERAL RADIOLOGY | Age: 73
DRG: 815 | End: 2018-10-10
Payer: MEDICARE

## 2018-10-10 VITALS — OXYGEN SATURATION: 96 % | DIASTOLIC BLOOD PRESSURE: 72 MMHG | SYSTOLIC BLOOD PRESSURE: 125 MMHG

## 2018-10-10 LAB
ANION GAP SERPL CALCULATED.3IONS-SCNC: 13 MMOL/L (ref 3–16)
BUN BLDV-MCNC: 8 MG/DL (ref 7–20)
CALCIUM SERPL-MCNC: 9.2 MG/DL (ref 8.3–10.6)
CHLORIDE BLD-SCNC: 103 MMOL/L (ref 99–110)
CO2: 26 MMOL/L (ref 21–32)
CREAT SERPL-MCNC: 1.3 MG/DL (ref 0.6–1.2)
GFR AFRICAN AMERICAN: 49
GFR NON-AFRICAN AMERICAN: 40
GLUCOSE BLD-MCNC: 149 MG/DL (ref 70–99)
GLUCOSE BLD-MCNC: 71 MG/DL (ref 70–99)
GLUCOSE BLD-MCNC: 71 MG/DL (ref 70–99)
GLUCOSE BLD-MCNC: 79 MG/DL (ref 70–99)
GLUCOSE BLD-MCNC: 82 MG/DL (ref 70–99)
GLUCOSE BLD-MCNC: 85 MG/DL (ref 70–99)
GLUCOSE BLD-MCNC: 97 MG/DL (ref 70–99)
HCT VFR BLD CALC: 28.6 % (ref 36–48)
HEMOGLOBIN: 9.2 G/DL (ref 12–16)
INR BLD: 1.4 (ref 0.86–1.14)
MCH RBC QN AUTO: 29 PG (ref 26–34)
MCHC RBC AUTO-ENTMCNC: 32.1 G/DL (ref 31–36)
MCV RBC AUTO: 90.2 FL (ref 80–100)
PDW BLD-RTO: 16.7 % (ref 12.4–15.4)
PERFORMED ON: ABNORMAL
PERFORMED ON: NORMAL
PLATELET # BLD: 287 K/UL (ref 135–450)
PMV BLD AUTO: 7.7 FL (ref 5–10.5)
POTASSIUM REFLEX MAGNESIUM: 3.9 MMOL/L (ref 3.5–5.1)
PROTHROMBIN TIME: 16 SEC (ref 9.8–13)
RBC # BLD: 3.17 M/UL (ref 4–5.2)
SODIUM BLD-SCNC: 142 MMOL/L (ref 136–145)
WBC # BLD: 5.9 K/UL (ref 4–11)

## 2018-10-10 PROCEDURE — 6360000002 HC RX W HCPCS: Performed by: NURSE ANESTHETIST, CERTIFIED REGISTERED

## 2018-10-10 PROCEDURE — 2060000000 HC ICU INTERMEDIATE R&B

## 2018-10-10 PROCEDURE — 3700000001 HC ADD 15 MINUTES (ANESTHESIA): Performed by: INTERNAL MEDICINE

## 2018-10-10 PROCEDURE — 2580000003 HC RX 258: Performed by: NURSE ANESTHETIST, CERTIFIED REGISTERED

## 2018-10-10 PROCEDURE — 2709999900 HC NON-CHARGEABLE SUPPLY: Performed by: INTERNAL MEDICINE

## 2018-10-10 PROCEDURE — 7100000001 HC PACU RECOVERY - ADDTL 15 MIN: Performed by: INTERNAL MEDICINE

## 2018-10-10 PROCEDURE — 6370000000 HC RX 637 (ALT 250 FOR IP): Performed by: INTERNAL MEDICINE

## 2018-10-10 PROCEDURE — 80048 BASIC METABOLIC PNL TOTAL CA: CPT

## 2018-10-10 PROCEDURE — 88342 IMHCHEM/IMCYTCHM 1ST ANTB: CPT

## 2018-10-10 PROCEDURE — 99231 SBSQ HOSP IP/OBS SF/LOW 25: CPT | Performed by: SURGERY

## 2018-10-10 PROCEDURE — 6360000004 HC RX CONTRAST MEDICATION: Performed by: INTERNAL MEDICINE

## 2018-10-10 PROCEDURE — 2580000003 HC RX 258: Performed by: INTERNAL MEDICINE

## 2018-10-10 PROCEDURE — 0DB98ZX EXCISION OF DUODENUM, VIA NATURAL OR ARTIFICIAL OPENING ENDOSCOPIC, DIAGNOSTIC: ICD-10-PCS | Performed by: INTERNAL MEDICINE

## 2018-10-10 PROCEDURE — 88305 TISSUE EXAM BY PATHOLOGIST: CPT

## 2018-10-10 PROCEDURE — 7100000000 HC PACU RECOVERY - FIRST 15 MIN: Performed by: INTERNAL MEDICINE

## 2018-10-10 PROCEDURE — 3700000000 HC ANESTHESIA ATTENDED CARE: Performed by: INTERNAL MEDICINE

## 2018-10-10 PROCEDURE — 36415 COLL VENOUS BLD VENIPUNCTURE: CPT

## 2018-10-10 PROCEDURE — 2500000003 HC RX 250 WO HCPCS: Performed by: INTERNAL MEDICINE

## 2018-10-10 PROCEDURE — 85027 COMPLETE CBC AUTOMATED: CPT

## 2018-10-10 PROCEDURE — 0DBL8ZX EXCISION OF TRANSVERSE COLON, VIA NATURAL OR ARTIFICIAL OPENING ENDOSCOPIC, DIAGNOSTIC: ICD-10-PCS | Performed by: INTERNAL MEDICINE

## 2018-10-10 PROCEDURE — 6370000000 HC RX 637 (ALT 250 FOR IP): Performed by: NURSE PRACTITIONER

## 2018-10-10 PROCEDURE — 3609012400 HC EGD TRANSORAL BIOPSY SINGLE/MULTIPLE: Performed by: INTERNAL MEDICINE

## 2018-10-10 PROCEDURE — APPSS15 APP SPLIT SHARED TIME 0-15 MINUTES: Performed by: NURSE PRACTITIONER

## 2018-10-10 PROCEDURE — 2500000003 HC RX 250 WO HCPCS: Performed by: NURSE ANESTHETIST, CERTIFIED REGISTERED

## 2018-10-10 PROCEDURE — APPNB30 APP NON BILLABLE TIME 0-30 MINS: Performed by: NURSE PRACTITIONER

## 2018-10-10 PROCEDURE — 6370000000 HC RX 637 (ALT 250 FOR IP): Performed by: SURGERY

## 2018-10-10 PROCEDURE — 85610 PROTHROMBIN TIME: CPT

## 2018-10-10 PROCEDURE — 3609010300 HC COLONOSCOPY W/BIOPSY SINGLE/MULTIPLE: Performed by: INTERNAL MEDICINE

## 2018-10-10 PROCEDURE — 2709999900 FL BARIUM ENEMA W AIR CONTRAST

## 2018-10-10 RX ORDER — LIDOCAINE HYDROCHLORIDE 20 MG/ML
INJECTION, SOLUTION INFILTRATION; PERINEURAL PRN
Status: DISCONTINUED | OUTPATIENT
Start: 2018-10-10 | End: 2018-10-10 | Stop reason: SDUPTHER

## 2018-10-10 RX ORDER — SODIUM CHLORIDE 9 MG/ML
INJECTION, SOLUTION INTRAVENOUS CONTINUOUS PRN
Status: DISCONTINUED | OUTPATIENT
Start: 2018-10-10 | End: 2018-10-10 | Stop reason: SDUPTHER

## 2018-10-10 RX ORDER — PROPOFOL 10 MG/ML
INJECTION, EMULSION INTRAVENOUS PRN
Status: DISCONTINUED | OUTPATIENT
Start: 2018-10-10 | End: 2018-10-10 | Stop reason: SDUPTHER

## 2018-10-10 RX ADMIN — ASPIRIN 325 MG: 325 TABLET, DELAYED RELEASE ORAL at 15:18

## 2018-10-10 RX ADMIN — CARVEDILOL 3.12 MG: 3.12 TABLET, FILM COATED ORAL at 15:18

## 2018-10-10 RX ADMIN — SODIUM CHLORIDE: 9 INJECTION, SOLUTION INTRAVENOUS at 10:43

## 2018-10-10 RX ADMIN — PROPOFOL 50 MG: 10 INJECTION, EMULSION INTRAVENOUS at 11:15

## 2018-10-10 RX ADMIN — PRAVASTATIN SODIUM 40 MG: 40 TABLET ORAL at 20:47

## 2018-10-10 RX ADMIN — ACETAMINOPHEN 650 MG: 325 TABLET, FILM COATED ORAL at 17:19

## 2018-10-10 RX ADMIN — PROPOFOL 50 MG: 10 INJECTION, EMULSION INTRAVENOUS at 11:02

## 2018-10-10 RX ADMIN — MONTELUKAST SODIUM 10 MG: 10 TABLET, FILM COATED ORAL at 20:47

## 2018-10-10 RX ADMIN — OXYCODONE HYDROCHLORIDE 5 MG: 5 TABLET ORAL at 20:53

## 2018-10-10 RX ADMIN — PROPOFOL 50 MG: 10 INJECTION, EMULSION INTRAVENOUS at 10:55

## 2018-10-10 RX ADMIN — CYANOCOBALAMIN TAB 1000 MCG 500 MCG: 1000 TAB at 15:18

## 2018-10-10 RX ADMIN — LIDOCAINE HYDROCHLORIDE 40 MG: 20 INJECTION, SOLUTION INFILTRATION; PERINEURAL at 10:43

## 2018-10-10 RX ADMIN — DIATRIZOATE MEGLUMINE AND DIATRIZOATE SODIUM 480 ML: 600; 100 SOLUTION ORAL; RECTAL at 14:41

## 2018-10-10 RX ADMIN — NYSTATIN 500000 UNITS: 100000 SUSPENSION ORAL at 15:23

## 2018-10-10 RX ADMIN — Medication 1 CAPSULE: at 15:19

## 2018-10-10 RX ADMIN — AMOXICILLIN AND CLAVULANATE POTASSIUM 1 TABLET: 875; 125 TABLET, FILM COATED ORAL at 20:47

## 2018-10-10 RX ADMIN — FERROUS SULFATE TAB 325 MG (65 MG ELEMENTAL FE) 325 MG: 325 (65 FE) TAB at 15:19

## 2018-10-10 RX ADMIN — TORSEMIDE 20 MG: 20 TABLET ORAL at 15:18

## 2018-10-10 RX ADMIN — Medication 10 ML: at 20:47

## 2018-10-10 RX ADMIN — OXYCODONE HYDROCHLORIDE 5 MG: 5 TABLET ORAL at 15:19

## 2018-10-10 RX ADMIN — PROPOFOL 50 MG: 10 INJECTION, EMULSION INTRAVENOUS at 11:09

## 2018-10-10 RX ADMIN — PROPOFOL 50 MG: 10 INJECTION, EMULSION INTRAVENOUS at 10:49

## 2018-10-10 RX ADMIN — PROPOFOL 50 MG: 10 INJECTION, EMULSION INTRAVENOUS at 10:45

## 2018-10-10 RX ADMIN — Medication 10 ML: at 15:23

## 2018-10-10 RX ADMIN — DIGOXIN 0.06 MG: 125 TABLET ORAL at 15:19

## 2018-10-10 ASSESSMENT — PULMONARY FUNCTION TESTS
PIF_VALUE: 0

## 2018-10-10 ASSESSMENT — PAIN SCALES - GENERAL
PAINLEVEL_OUTOF10: 4
PAINLEVEL_OUTOF10: 7
PAINLEVEL_OUTOF10: 0
PAINLEVEL_OUTOF10: 5
PAINLEVEL_OUTOF10: 5
PAINLEVEL_OUTOF10: 6
PAINLEVEL_OUTOF10: 0
PAINLEVEL_OUTOF10: 0

## 2018-10-10 ASSESSMENT — PAIN DESCRIPTION - PAIN TYPE: TYPE: CHRONIC PAIN

## 2018-10-10 ASSESSMENT — PAIN DESCRIPTION - LOCATION
LOCATION: BACK
LOCATION: BACK;RIB CAGE

## 2018-10-10 ASSESSMENT — PAIN - FUNCTIONAL ASSESSMENT: PAIN_FUNCTIONAL_ASSESSMENT: 0-10

## 2018-10-10 ASSESSMENT — ENCOUNTER SYMPTOMS: SHORTNESS OF BREATH: 1

## 2018-10-10 NOTE — PLAN OF CARE
Problem: Falls - Risk of:  Goal: Will remain free from falls  Will remain free from falls   Outcome: Ongoing  Pt remains free from falls. Safety precautions in place. Bed in lowest position, bed wheels locked, low fall risk, alert and oriented, pt remains on push/pull restrictions post-cabg, able to call for assistance as needed, call light with in reach, yellow blanket in place, fall risk wrist band on, SAFE outside of doorway. Will continue to monitor. Problem: Daily Care:  Goal: Daily care needs are met  Daily care needs are met   Outcome: Ongoing  Pt independent, family at bedside to assist with needs, call light within reach, will continue to monitor.

## 2018-10-10 NOTE — ONCOLOGY
Oncology and Hematology Care   Progress Note    10/9/2018    SUBJECTIVE:  Patient has had a couple episodes of bright red blood per rectum. , She states her abdominal pain Is better but she has back pain which is chronic. She has problems lying in bed. She is currently off anticoagulation except for aspirin in preparation for colonoscopy tomorrow. She thinks rectal bleeding is better this morning     OBJECTIVE:    Physical Assessment:  Vitals:  /75   Pulse 70   Temp 97.3 °F (36.3 °C) (Temporal)   Resp 16   Ht 5' 8\" (1.727 m)   Wt 212 lb 1.6 oz (96.2 kg)   SpO2 97%   BMI 32.25 kg/m²    24HR INTAKE/OUTPUT:    Intake/Output Summary (Last 24 hours) at 10/09/18 2002  Last data filed at 10/09/18 1707   Gross per 24 hour   Intake             1080 ml   Output                0 ml   Net             1080 ml       CONSTITUTIONAL:  Awake, alert & oriented x3  HEENT: PERRL, Neck: soft, supple, no cervical, supraclavicular or axillary adenopathy  RESPIRATORY:  No increased work of breathing, breath sounds decreased. CARDIOVASCULAR:  Cardiac S1/S2, RRR  GASTROINTESTINAL:  abdomen soft +BS x4, no hepatosplenomegaly mild tenderness in right mid abdomen   SKIN:  negative for rash and skin lesion(s)  MUSCULOSKELETAL:  negative for pain and muscle weakness  EXTREMITIES: Negative for Lower extremity edema    Labs Results:    CBC: Recent Labs      10/07/18   0830  10/08/18   0501  10/09/18   0446   WBC  5.7  5.6  5.7   HGB  8.0*  7.9*  8.5*   HCT  24.5*  24.4*  26.6*   MCV  89.3  89.6  90.7   PLT  295  273  278     BMP: Recent Labs      10/07/18   0830  10/07/18   1815  10/08/18   0501  10/09/18   0446   NA  142   --   145  141   K  3.3*  3.7  3.4*  3.7   CL  105   --   107  104   CO2  28   --   26  25   BUN  8   --   9  11   CREATININE  1.3*   --   1.4*  1.3*     LIVER PROFILE: No results for input(s): AST, ALT, LIPASE, BILIDIR, BILITOT, ALKPHOS in the last 72 hours. Invalid input(s):   AMYLASE,  ALB  PT/INR:    Lab developed splenic infarct and possible colitis despite supratherapeutic inr  Anemia stable  History of eosinophilic gastritis treated intermittently with steroids  Diabetes mellitis        I have discussed the above stated plan with the patient and they verbalized understanding and agreed with the plan. Thank you for allowing us to participate in this patients care.     Abebe Worthington MD, 10/9/2018, 8:02 PM

## 2018-10-10 NOTE — OP NOTE
[address] [facility]  Colonoscopy Note    Patient: Valerie Yadav  : 1945  Acct#:     Procedure: Colonoscopy with biopsy    Date:  10/10/2018    Surgeon:  Adry Guevara MD    Referring Physician:  Debbie Pisano MD    Anesthesia: IV propofol, per anesthesia    EBL: <50 mL    Indications: This is a 71y.o. year old female who presents today with colitis on CT and iron deficiency anemia. Procedure & Findings  An informed consent was obtained from the patient after explanation of indications, benefits, possible risks and complications of the procedure. The patient was then taken to the endoscopy suite, placed in the left lateral decubitus position, and the above IV anesthesia was administered. A digital rectal examination was performed and revealed negative without mass, lesions or tenderness, internal hemorrhoids noted, external hemorrhoids noted. The Olympus CFQ-180-AL video colonoscope was placed in the patient's rectum under digital direction. The colon was extremely redundant and tortuous. Despite manual pressure and 2 nurses applying abdominal pressure, we only reached the mid-transverse colon. Bowel prep was adequate. There were left sided diverticulosis. The colon mucosa appeared normal.  Random colon biopsies were performed. There were medium sized internal and external hemorrhoids with redundant stacie-anal tissue. Biopsies: Yes. Random colon       Impression:  Incomplete colonoscopy. We were only to examine up to the mid-transverse colon due to colon tortuosity and redundancy. Left sided diverticulosis. Medium sized internal and external hemorrhoids with redundant stacie-anal tissue (this is the likely cause of recent rectal bleeding). Recommendations:  Await pathology. Barium enema to evaluate the right colon. Consult surgery regarding hemorrhoid management.        Adry Guevara MD, MSc  [address]  10/10/2018

## 2018-10-10 NOTE — PROGRESS NOTES
Patient back in room from egd and colonoscopy. Report received from nurse. Given propofol for procedure and new IV on left arm. On 2 liters 02 because sats were lower. Will need barium enema to visualize the rest of her right colon done by radiology this afternoon. VSS. Noon assessment completed. Patient in bed with  at bedside. Will continue to monitor.

## 2018-10-11 LAB
ANION GAP SERPL CALCULATED.3IONS-SCNC: 10 MMOL/L (ref 3–16)
BUN BLDV-MCNC: 11 MG/DL (ref 7–20)
CALCIUM SERPL-MCNC: 9 MG/DL (ref 8.3–10.6)
CHLORIDE BLD-SCNC: 104 MMOL/L (ref 99–110)
CO2: 29 MMOL/L (ref 21–32)
CREAT SERPL-MCNC: 1.3 MG/DL (ref 0.6–1.2)
GFR AFRICAN AMERICAN: 49
GFR NON-AFRICAN AMERICAN: 40
GLUCOSE BLD-MCNC: 109 MG/DL (ref 70–99)
GLUCOSE BLD-MCNC: 129 MG/DL (ref 70–99)
GLUCOSE BLD-MCNC: 181 MG/DL (ref 70–99)
GLUCOSE BLD-MCNC: 194 MG/DL (ref 70–99)
GLUCOSE BLD-MCNC: 96 MG/DL (ref 70–99)
HCT VFR BLD CALC: 25.5 % (ref 36–48)
HEMOGLOBIN: 8.4 G/DL (ref 12–16)
INR BLD: 1.29 (ref 0.86–1.14)
MCH RBC QN AUTO: 29.4 PG (ref 26–34)
MCHC RBC AUTO-ENTMCNC: 32.9 G/DL (ref 31–36)
MCV RBC AUTO: 89.5 FL (ref 80–100)
PDW BLD-RTO: 16.2 % (ref 12.4–15.4)
PERFORMED ON: ABNORMAL
PERFORMED ON: NORMAL
PLATELET # BLD: 249 K/UL (ref 135–450)
PMV BLD AUTO: 7.5 FL (ref 5–10.5)
POTASSIUM REFLEX MAGNESIUM: 3.7 MMOL/L (ref 3.5–5.1)
PROTHROMBIN TIME: 14.7 SEC (ref 9.8–13)
RBC # BLD: 2.85 M/UL (ref 4–5.2)
SODIUM BLD-SCNC: 143 MMOL/L (ref 136–145)
WBC # BLD: 5.6 K/UL (ref 4–11)

## 2018-10-11 PROCEDURE — 85610 PROTHROMBIN TIME: CPT

## 2018-10-11 PROCEDURE — 99231 SBSQ HOSP IP/OBS SF/LOW 25: CPT | Performed by: SURGERY

## 2018-10-11 PROCEDURE — APPSS15 APP SPLIT SHARED TIME 0-15 MINUTES: Performed by: NURSE PRACTITIONER

## 2018-10-11 PROCEDURE — 2060000000 HC ICU INTERMEDIATE R&B

## 2018-10-11 PROCEDURE — 36415 COLL VENOUS BLD VENIPUNCTURE: CPT

## 2018-10-11 PROCEDURE — 6370000000 HC RX 637 (ALT 250 FOR IP): Performed by: INTERNAL MEDICINE

## 2018-10-11 PROCEDURE — 6370000000 HC RX 637 (ALT 250 FOR IP): Performed by: SURGERY

## 2018-10-11 PROCEDURE — 85027 COMPLETE CBC AUTOMATED: CPT

## 2018-10-11 PROCEDURE — 6370000000 HC RX 637 (ALT 250 FOR IP): Performed by: HOSPITALIST

## 2018-10-11 PROCEDURE — APPNB30 APP NON BILLABLE TIME 0-30 MINS: Performed by: NURSE PRACTITIONER

## 2018-10-11 PROCEDURE — 2580000003 HC RX 258: Performed by: INTERNAL MEDICINE

## 2018-10-11 PROCEDURE — 6370000000 HC RX 637 (ALT 250 FOR IP): Performed by: NURSE PRACTITIONER

## 2018-10-11 PROCEDURE — 80048 BASIC METABOLIC PNL TOTAL CA: CPT

## 2018-10-11 RX ORDER — LACTOBACILLUS RHAMNOSUS GG 10B CELL
1 CAPSULE ORAL 2 TIMES DAILY WITH MEALS
Qty: 30 CAPSULE | Refills: 0 | Status: SHIPPED | OUTPATIENT
Start: 2018-10-11 | End: 2018-11-06 | Stop reason: ALTCHOICE

## 2018-10-11 RX ORDER — ASPIRIN 81 MG/1
81 TABLET, CHEWABLE ORAL DAILY
Qty: 30 TABLET | Refills: 3 | Status: SHIPPED | OUTPATIENT
Start: 2018-10-12 | End: 2018-10-26

## 2018-10-11 RX ORDER — ASPIRIN 81 MG/1
81 TABLET, CHEWABLE ORAL DAILY
Status: DISCONTINUED | OUTPATIENT
Start: 2018-10-12 | End: 2018-10-12 | Stop reason: HOSPADM

## 2018-10-11 RX ORDER — AMOXICILLIN AND CLAVULANATE POTASSIUM 875; 125 MG/1; MG/1
1 TABLET, FILM COATED ORAL EVERY 12 HOURS SCHEDULED
Qty: 4 TABLET | Refills: 0 | Status: SHIPPED | OUTPATIENT
Start: 2018-10-11 | End: 2018-10-13

## 2018-10-11 RX ORDER — FERROUS SULFATE 325(65) MG
325 TABLET ORAL 2 TIMES DAILY WITH MEALS
Qty: 30 TABLET | Refills: 3 | Status: SHIPPED | OUTPATIENT
Start: 2018-10-11 | End: 2018-10-26 | Stop reason: SINTOL

## 2018-10-11 RX ORDER — OXYCODONE HYDROCHLORIDE 5 MG/1
5 TABLET ORAL EVERY 6 HOURS PRN
Qty: 20 TABLET | Refills: 0 | Status: SHIPPED | OUTPATIENT
Start: 2018-10-11 | End: 2018-12-28 | Stop reason: SDUPTHER

## 2018-10-11 RX ADMIN — FERROUS SULFATE TAB 325 MG (65 MG ELEMENTAL FE) 325 MG: 325 (65 FE) TAB at 17:19

## 2018-10-11 RX ADMIN — MONTELUKAST SODIUM 10 MG: 10 TABLET, FILM COATED ORAL at 20:23

## 2018-10-11 RX ADMIN — FERROUS SULFATE TAB 325 MG (65 MG ELEMENTAL FE) 325 MG: 325 (65 FE) TAB at 10:22

## 2018-10-11 RX ADMIN — Medication 1 CAPSULE: at 17:19

## 2018-10-11 RX ADMIN — NYSTATIN 500000 UNITS: 100000 SUSPENSION ORAL at 10:21

## 2018-10-11 RX ADMIN — OXYCODONE HYDROCHLORIDE 5 MG: 5 TABLET ORAL at 20:23

## 2018-10-11 RX ADMIN — Medication 10 ML: at 10:23

## 2018-10-11 RX ADMIN — PRAVASTATIN SODIUM 40 MG: 40 TABLET ORAL at 20:23

## 2018-10-11 RX ADMIN — APIXABAN 2.5 MG: 5 TABLET, FILM COATED ORAL at 22:56

## 2018-10-11 RX ADMIN — GLIMEPIRIDE 1 MG: 2 TABLET ORAL at 12:47

## 2018-10-11 RX ADMIN — NYSTATIN 500000 UNITS: 100000 SUSPENSION ORAL at 17:20

## 2018-10-11 RX ADMIN — CARVEDILOL 3.12 MG: 3.12 TABLET, FILM COATED ORAL at 10:21

## 2018-10-11 RX ADMIN — NYSTATIN 500000 UNITS: 100000 SUSPENSION ORAL at 12:47

## 2018-10-11 RX ADMIN — POLYETHYLENE GLYCOL 3350 17 G: 17 POWDER, FOR SOLUTION ORAL at 10:23

## 2018-10-11 RX ADMIN — CARVEDILOL 3.12 MG: 3.12 TABLET, FILM COATED ORAL at 17:19

## 2018-10-11 RX ADMIN — TORSEMIDE 20 MG: 20 TABLET ORAL at 10:22

## 2018-10-11 RX ADMIN — APIXABAN 2.5 MG: 5 TABLET, FILM COATED ORAL at 17:19

## 2018-10-11 RX ADMIN — Medication 1 CAPSULE: at 10:21

## 2018-10-11 RX ADMIN — AMOXICILLIN AND CLAVULANATE POTASSIUM 1 TABLET: 875; 125 TABLET, FILM COATED ORAL at 10:21

## 2018-10-11 RX ADMIN — INSULIN LISPRO 1 UNITS: 100 INJECTION, SOLUTION INTRAVENOUS; SUBCUTANEOUS at 17:59

## 2018-10-11 RX ADMIN — AMOXICILLIN AND CLAVULANATE POTASSIUM 1 TABLET: 875; 125 TABLET, FILM COATED ORAL at 20:23

## 2018-10-11 RX ADMIN — DIGOXIN 0.06 MG: 125 TABLET ORAL at 10:21

## 2018-10-11 RX ADMIN — ASPIRIN 325 MG: 325 TABLET, DELAYED RELEASE ORAL at 10:21

## 2018-10-11 RX ADMIN — CYANOCOBALAMIN TAB 1000 MCG 500 MCG: 1000 TAB at 10:22

## 2018-10-11 RX ADMIN — ACETAMINOPHEN 650 MG: 325 TABLET, FILM COATED ORAL at 10:22

## 2018-10-11 ASSESSMENT — PAIN DESCRIPTION - DESCRIPTORS: DESCRIPTORS: ACHING

## 2018-10-11 ASSESSMENT — PAIN SCALES - GENERAL
PAINLEVEL_OUTOF10: 6
PAINLEVEL_OUTOF10: 0
PAINLEVEL_OUTOF10: 6
PAINLEVEL_OUTOF10: 0

## 2018-10-11 ASSESSMENT — PAIN DESCRIPTION - LOCATION: LOCATION: BACK

## 2018-10-11 ASSESSMENT — PAIN DESCRIPTION - PAIN TYPE: TYPE: CHRONIC PAIN

## 2018-10-11 NOTE — PROGRESS NOTES
[facility] GI  Gastroenterology Progress Note    Khanh Chirinos is a 71 y.o. female patient. 1. Abdominal pain, right upper quadrant    2. Colitis    3. Splenic infarct    4. Supratherapeutic INR    5. Elevated troponin        SUBJECTIVE:    No recurrent GI bleed  Feels well   Eliquis restarted today     S/p egd and colonoscopy 10/10 (results below)       ROS:  Cardiovascular ROS: no chest pain or dyspnea on exertion  Gastrointestinal ROS: see above  Respiratory ROS: no cough, shortness of breath, or wheezing    Physical    VITALS:  BP (!) 129/91   Pulse 83   Temp 98 °F (36.7 °C) (Oral)   Resp 18   Ht 5' 8\" (1.727 m)   Wt 212 lb 8 oz (96.4 kg)   SpO2 95%   BMI 32.31 kg/m²   TEMPERATURE:  Current - Temp: 98 °F (36.7 °C); Max - Temp  Av °F (36.7 °C)  Min: 97.7 °F (36.5 °C)  Max: 98.4 °F (36.9 °C)    NAD  RRR, Nl s1s2  Lungs CTA Bilaterally, normal effort  Abdomen soft, ND, NT, no HSM, Bowel sounds normal  AAOx3, No asterixis     Data      CBC:   Recent Labs      10/09/18   0446  10/10/18   0454  10/11/18   0440   WBC  5.7  5.9  5.6   RBC  2.93*  3.17*  2.85*   HGB  8.5*  9.2*  8.4*   HCT  26.6*  28.6*  25.5*   PLT  278  287  249   MCV  90.7  90.2  89.5   MCH  29.0  29.0  29.4   MCHC  32.0  32.1  32.9   RDW  16.5*  16.7*  16.2*        BMP:  Recent Labs      10/09/18   0446  10/10/18   0453  10/11/18   0440   NA  141  142  143   K  3.7  3.9  3.7   CL  104  103  104   CO2  25  26  29   BUN  11  8  11   CREATININE  1.3*  1.3*  1.3*   CALCIUM  9.0  9.2  9.0   GLUCOSE  65*  71  109*        Hepatic Function Panel:   Lab Results   Component Value Date    ALKPHOS 108 10/03/2018    ALT 12 10/03/2018    AST 13 10/03/2018    PROT 7.6 10/03/2018    BILITOT 0.5 10/03/2018    BILIDIR <0.2 2018    IBILI see below 2018    LABALBU 3.1 10/03/2018       No results for input(s): LIPASE, AMYLASE in the last 72 hours.   Recent Labs      10/09/18   0446  10/10/18   0454  10/11/18   0440   PROTIME  17.1*  16.0*  14.7*   INR 1.50*  1.40*  1.29*     No results for input(s): PTT in the last 72 hours. No results for input(s): OCCULTBLD in the last 72 hours. EGD 10/10  -Mild gastritis, biopsied  -Normal duodenum, biopsied     Colonoscopy 10/10  Incomplete colonoscopy. We were only to examine up to the mid-transverse colon due to colon tortuosity and redundancy. Left sided diverticulosis. Medium sized internal and external hemorrhoids with redundant stacie-anal tissue (this is the likely cause of recent rectal bleeding). A. Duodenum, biopsy:     - No significant pathologic change.     -     No histologic features of celiac disease. B. Stomach, biopsy:     - Antral and oxyntic mucosa with chronic focal active gastritis. Note: A helicobacter pylori stain shows non-specific background staining  with no definitive organisms. Helicobacter pylori infection cannot be  entirely excluded due to background inflammation pattern. Correlation  with pertinent laboratory test may be helpful in this case. C. Colon, random, biopsy:     - No significant pathologic change.     - No histologic features of microscopic colitis. Barium enema 10/10  No obvious evidence of stricture or obstruction on this single contrast   examination.       Diverticulosis.           Assessment:   The patient is a 71 y. o. old female  with following problems:     Active Problems:    Splenic infarct    Abdominal pain, right upper quadrant    Acute colitis    Infection of superficial incisional surgical site after procedure    Obesity, Class I, BMI 30-34. 9    Diabetes education, encounter for  Resolved Problems:    * No resolved hospital problems. *     Rectal bleeding while on Eliquis.  She has chronic anemia.  She would also require long term anticoagulation.    S/p EGD and colonoscopy - no active bleeding. Rectal bleeding likely due to hemorrhoids   CAD s/p CABG and valvular replacement 8/2018  DM   Hypettension      Recommendations:    May resume Eliquis   Bowel regimen to prevent hemorrhoidal bleeding   GI will sign-off, please call if you have questions     Clarence Salgado MD, MSc  [facility]

## 2018-10-11 NOTE — PROGRESS NOTES
Wt 212 lb 8 oz (96.4 kg)   SpO2 95%   BMI 32.31 kg/m²   No intake or output data in the 24 hours ending 10/11/18 1415        CONSTITUTIONAL:  Awake, alert & oriented x3  HEENT: PERRL, Neck: soft, supple, no cervical, supraclavicular or axillary adenopathy  RESPIRATORY:  No increased work of breathing, breath sounds decreased. CARDIOVASCULAR:  Cardiac S1/S2, RRR  GASTROINTESTINAL:  abdomen soft +BS x4, no hepatosplenomegaly mild tenderness in right mid abdomen   SKIN:  negative for rash and skin lesion(s)  MUSCULOSKELETAL:  negative for pain and muscle weakness  EXTREMITIES: Negative for Lower extremity edema    Labs and Tests:  CBC:   Recent Labs      10/09/18   0446  10/10/18   0454  10/11/18   0440   WBC  5.7  5.9  5.6   HGB  8.5*  9.2*  8.4*   PLT  278  287  249     BMP:    Recent Labs      10/09/18   0446  10/10/18   0453  10/11/18   0440   NA  141  142  143   K  3.7  3.9  3.7   CL  104  103  104   CO2  25  26  29   BUN  11  8  11   CREATININE  1.3*  1.3*  1.3*   GLUCOSE  65*  71  109*     Hepatic: No results for input(s): AST, ALT, ALB, BILITOT, ALKPHOS in the last 72 hours. ASSESSMENT AND PLAN    Active Problems:    Splenic infarct    Abdominal pain, right upper quadrant    Acute colitis    Infection of superficial incisional surgical site after procedure    Obesity, Class I, BMI 30-34.9    Diabetes education, encounter for  Resolved Problems:    * No resolved hospital problems. *    Splenic infarct ? Ischemic colitis. Had been on warfarin and developed splenic infarct and possible colitis despite supratherapeutic INR. Discussed with Dr. Annette Toth, will start Eliquis 2.5 mg BID. Recent mitral valve replacement and CABG. Cardiology recommends Eliquis for [facility]. This is currently on hold due to rectal bleeding. Barium enema results showed no stricture or obstruction. Rectal bleeding. Incomplete colonoscopy today. Left sided diverticulosis and medium sized internal and external hemorrhoids.

## 2018-10-11 NOTE — PROGRESS NOTES
inflammatory   colitis. 2. New irregular wedge-shaped heterogeneous hypodensity involving the   superomedial spleen. Findings are concerning for splenic infarction. 3. Stable appearance of cystic lesion involving the left adnexa measuring up   to 7.8 cm, which has shown interval growth over time. Given size, MRI with   IV contrast or surgical evaluation is suggested. Scheduled Meds:   lidocaine  1 patch Transdermal Daily    glimepiride  1 mg Oral Daily before lunch    ferrous sulfate  325 mg Oral BID WC    vitamin B-12  500 mcg Oral Daily    torsemide  20 mg Oral Daily    polyethylene glycol  17 g Oral Daily    amoxicillin-clavulanate  1 tablet Oral 2 times per day    lactobacillus  1 capsule Oral BID WC    insulin lispro  0-6 Units Subcutaneous TID WC    insulin lispro  0-3 Units Subcutaneous Nightly    nystatin  5 mL Oral 4x Daily    aspirin  325 mg Oral Daily    carvedilol  3.125 mg Oral BID WC    digoxin  0.0625 mg Oral Daily    pravastatin  40 mg Oral Nightly    sodium chloride flush  10 mL Intravenous 2 times per day    montelukast  10 mg Oral Nightly    ondansetron  4 mg Intravenous Once     Continuous Infusions:   dextrose       PRN Meds:.diatrizoate meglumine-sodium, oxyCODONE **OR** [DISCONTINUED] oxyCODONE, acetaminophen, sodium chloride flush, ondansetron, potassium chloride, magnesium sulfate, glucose, dextrose, glucagon (rDNA), dextrose      Assessment:  71 y.o. female admitted with   1. Abdominal pain, right upper quadrant    2. Colitis    3. Splenic infarct    4. Supratherapeutic INR    5. Elevated troponin      Ms. Reese Burk is a 71 y.o. female who presents with   Ischemic vs infectious colitis  Splenic infarction  Atrial fibrillation on coumadin  Supratherapeutic INR  CAD, s/p CABG  Bactremia       Plan:  1. Barium enema results benign; would not consider surgical excision for hemorrhoids at this point. Improving bowel regimen will help.  The goal should be for one soft bowel movement daily, with minimal straining and without sitting on the toilet for prolonged periods of time. Increase water and fiber intake, may use Benefiber supplementation PRN. Colace 100 mg twice a day can be added and Miralax 17 g 1-2 times per day PRN, titrated to effect. 2. Diet as tolerated  3. Antibiotics per ID  4. Activity as tolerated  5. Pulmonary toilet, incentive spirometry  6. PRN analgesics and antiemetics--minimizing narcotics as tolerated  7. DVT prophylaxis with SCD's, okay to resume Eliquis from a surgical standpoint  8. Management of medical comorbid etiologies per primary team and consulting services  9. Disposition: Okay for discharge home from a surgical perspective; follow up with Surgery only as needed    EDUCATION:  Educated patient on plan of care and disease process--all questions answered. Plans discussed with patient and nursing. Reviewed and discussed with Dr. Andre Joseph. Signed:  SOLEDAD Mckinley - CNP  10/11/2018 9:21 AM      I have personally performed a face to face diagnostic evaluation on this patient. I have interviewed and examined the patient and I agree with the assessment above. In summary, my findings and plan are the following:   Ms. Kenyatta Sethi is a 71 y.o. female who presents with   Ischemic vs infectious colitis  Splenic infarction  Atrial fibrillation on coumadin  Supratherapeutic INR  CAD, s/p CABG  Bactremia  Hemorrhoids     Plan:  1. Pain present but improved and stable  2. Tolerating diet  3. Bowel function normalized, no further bloody stools, small amount of bleeding previously likely from hemorrhoids, colonoscopy normal other than hemorrhoids to transverse colon and incomplete secondary tortuosity (proximal transverse colon abnormal on CT not visualized) and barium enema negative for obstruction stricture or abnormality other than diverticulosis  4. Bleeding likely combination of hemorrhoidal disease and anticoagulation.  Would not consider surgical excision at this point. The patient will need to improve her bowel regimen with a goal of one soft bowel movement per day with minimal straining and less than 5 minutes on the toilet. This begins with increasing water and fiber intake and Benefiber supplementation as needed. Colace 100mg twice a day can be added and Miralax 17g 1-2 times per day if additional medication is needed. These can be titrated to effect. 5. Antibiotics per ID  6. Discharge planning, may discharge from surgery standpoint if medically stable and follow electively as needed    Tushar Aburto MD, FACS  10/11/2018  9:39 AM

## 2018-10-11 NOTE — PLAN OF CARE
Problem: Falls - Risk of:  Goal: Will remain free from falls  Will remain free from falls   Outcome: Ongoing  Pt remains free from falls. Safety precautions in place. Bed in lowest position, bed wheels locked, medium fall risk, alert and oriented, able to call for assistance as needed, call light with in reach, yellow blanket in place, fall risk wrist band on, SAFE outside of doorway. Will continue to monitor. Problem: Pain:  Goal: Pain level will decrease  Pain level will decrease   Outcome: Ongoing  Pt reports relief to chronic back pain with OxyIR as prescribed, will continue to monitor needs. Problem: Daily Care:  Goal: Daily care needs are met  Daily care needs are met   Outcome: Ongoing  Independent, aware of needs, calls out appropriately for assistance, family at bedside. Problem: Skin Integrity:  Goal: Skin integrity will stabilize  Skin integrity will stabilize -  wound to left groin where vein was used for CABG   No new observed signs of skin integrity impairment. Pt repositioning self. Will continue to monitor.

## 2018-10-12 VITALS
HEART RATE: 84 BPM | BODY MASS INDEX: 32.19 KG/M2 | SYSTOLIC BLOOD PRESSURE: 111 MMHG | TEMPERATURE: 97.8 F | WEIGHT: 212.4 LBS | RESPIRATION RATE: 16 BRPM | HEIGHT: 68 IN | DIASTOLIC BLOOD PRESSURE: 58 MMHG | OXYGEN SATURATION: 94 %

## 2018-10-12 LAB
ANION GAP SERPL CALCULATED.3IONS-SCNC: 13 MMOL/L (ref 3–16)
BUN BLDV-MCNC: 12 MG/DL (ref 7–20)
CALCIUM SERPL-MCNC: 8.8 MG/DL (ref 8.3–10.6)
CHLORIDE BLD-SCNC: 105 MMOL/L (ref 99–110)
CO2: 27 MMOL/L (ref 21–32)
CREAT SERPL-MCNC: 1.3 MG/DL (ref 0.6–1.2)
GFR AFRICAN AMERICAN: 49
GFR NON-AFRICAN AMERICAN: 40
GLUCOSE BLD-MCNC: 122 MG/DL (ref 70–99)
GLUCOSE BLD-MCNC: 141 MG/DL (ref 70–99)
GLUCOSE BLD-MCNC: 76 MG/DL (ref 70–99)
HCT VFR BLD CALC: 26 % (ref 36–48)
HEMOGLOBIN: 8.5 G/DL (ref 12–16)
INR BLD: 1.45 (ref 0.86–1.14)
MAGNESIUM: 2 MG/DL (ref 1.8–2.4)
MCH RBC QN AUTO: 29.3 PG (ref 26–34)
MCHC RBC AUTO-ENTMCNC: 32.6 G/DL (ref 31–36)
MCV RBC AUTO: 89.7 FL (ref 80–100)
PDW BLD-RTO: 16.6 % (ref 12.4–15.4)
PERFORMED ON: ABNORMAL
PERFORMED ON: NORMAL
PLATELET # BLD: 232 K/UL (ref 135–450)
PMV BLD AUTO: 7.9 FL (ref 5–10.5)
POTASSIUM REFLEX MAGNESIUM: 3.5 MMOL/L (ref 3.5–5.1)
PROTHROMBIN TIME: 16.5 SEC (ref 9.8–13)
RBC # BLD: 2.9 M/UL (ref 4–5.2)
SODIUM BLD-SCNC: 145 MMOL/L (ref 136–145)
WBC # BLD: 5.6 K/UL (ref 4–11)

## 2018-10-12 PROCEDURE — 6370000000 HC RX 637 (ALT 250 FOR IP): Performed by: SURGERY

## 2018-10-12 PROCEDURE — 6370000000 HC RX 637 (ALT 250 FOR IP): Performed by: INTERNAL MEDICINE

## 2018-10-12 PROCEDURE — APPSS15 APP SPLIT SHARED TIME 0-15 MINUTES: Performed by: NURSE PRACTITIONER

## 2018-10-12 PROCEDURE — 83735 ASSAY OF MAGNESIUM: CPT

## 2018-10-12 PROCEDURE — APPNB30 APP NON BILLABLE TIME 0-30 MINS: Performed by: NURSE PRACTITIONER

## 2018-10-12 PROCEDURE — 85610 PROTHROMBIN TIME: CPT

## 2018-10-12 PROCEDURE — 85027 COMPLETE CBC AUTOMATED: CPT

## 2018-10-12 PROCEDURE — 36415 COLL VENOUS BLD VENIPUNCTURE: CPT

## 2018-10-12 PROCEDURE — 80048 BASIC METABOLIC PNL TOTAL CA: CPT

## 2018-10-12 PROCEDURE — 99231 SBSQ HOSP IP/OBS SF/LOW 25: CPT | Performed by: SURGERY

## 2018-10-12 PROCEDURE — 6370000000 HC RX 637 (ALT 250 FOR IP): Performed by: NURSE PRACTITIONER

## 2018-10-12 PROCEDURE — 6370000000 HC RX 637 (ALT 250 FOR IP): Performed by: HOSPITALIST

## 2018-10-12 RX ADMIN — ASPIRIN 81 MG: 81 TABLET, CHEWABLE ORAL at 10:00

## 2018-10-12 RX ADMIN — FERROUS SULFATE TAB 325 MG (65 MG ELEMENTAL FE) 325 MG: 325 (65 FE) TAB at 10:00

## 2018-10-12 RX ADMIN — CYANOCOBALAMIN TAB 1000 MCG 500 MCG: 1000 TAB at 10:00

## 2018-10-12 RX ADMIN — GLIMEPIRIDE 1 MG: 2 TABLET ORAL at 10:00

## 2018-10-12 RX ADMIN — DIGOXIN 0.06 MG: 125 TABLET ORAL at 10:00

## 2018-10-12 RX ADMIN — Medication 1 CAPSULE: at 10:00

## 2018-10-12 RX ADMIN — NYSTATIN 500000 UNITS: 100000 SUSPENSION ORAL at 10:00

## 2018-10-12 RX ADMIN — TORSEMIDE 20 MG: 20 TABLET ORAL at 10:00

## 2018-10-12 RX ADMIN — POLYETHYLENE GLYCOL 3350 17 G: 17 POWDER, FOR SOLUTION ORAL at 10:00

## 2018-10-12 RX ADMIN — APIXABAN 2.5 MG: 5 TABLET, FILM COATED ORAL at 10:00

## 2018-10-12 RX ADMIN — AMOXICILLIN AND CLAVULANATE POTASSIUM 1 TABLET: 875; 125 TABLET, FILM COATED ORAL at 10:00

## 2018-10-12 RX ADMIN — CARVEDILOL 3.12 MG: 3.12 TABLET, FILM COATED ORAL at 10:00

## 2018-10-12 ASSESSMENT — PAIN SCALES - GENERAL
PAINLEVEL_OUTOF10: 0

## 2018-10-12 NOTE — PROGRESS NOTES
PRN. Colace 100 mg twice a day can be added and Miralax 17 g 1-2 times per day PRN, titrated to effect. 2. Diet as tolerated  3. Antibiotics per ID  4. Activity as tolerated  5. Pulmonary toilet, incentive spirometry  6. PRN analgesics and antiemetics--minimizing narcotics as tolerated  7. DVT prophylaxis with SCD's, okay to resume Eliquis from a surgical standpoint  8. Management of medical comorbid etiologies per primary team and consulting services  9. Disposition: Okay for discharge home from a surgical perspective; follow up with Surgery only as needed    EDUCATION:  Educated patient on plan of care and disease process--all questions answered. Plans discussed with patient and nursing. Reviewed and discussed with Dr. Denny Huber. Signed:  SOLEDAD Acosta - CNP  10/12/2018 8:37 AM    I have personally performed a face to face diagnostic evaluation on this patient. I have interviewed and examined the patient and I agree with the assessment above. In summary, my findings and plan are the following:   Ms. Nicci Lopez is a 71 y.o. female who presents with   Ischemic vs infectious colitis  Splenic infarction  Atrial fibrillation on coumadin  Supratherapeutic INR  CAD, s/p CABG  Bactremia  Hemorrhoids     Plan:  1. Pain controlled and almost resolved   2. Tolerating diet  3. Bowel function normalized, no further bloody stools, small amount of bleeding previously likely from hemorrhoids, colonoscopy normal other than hemorrhoids to transverse colon and incomplete secondary tortuosity (proximal transverse colon abnormal on CT not visualized) and barium enema negative for obstruction stricture or abnormality other than diverticulosis  4. Bleeding likely combination of hemorrhoidal disease and anticoagulation. Would not consider surgical excision at this point. The patient will need to improve her bowel regimen with a goal of one soft bowel movement per day with minimal straining and less than 5 minutes on the toilet.

## 2018-10-12 NOTE — PROGRESS NOTES
obvious evidence of stricture or obstruction on this single contrast   examination. Diverticulosis. XR CHEST PORTABLE   Final Result   No definite acute abnormality detected. Limited by patient's body habitus, portable technique, and by cardiomegaly. CT ABDOMEN PELVIS W IV CONTRAST Additional Contrast? None   Final Result   1. Mucosal thickening in haziness of the fat associated with the proximal   transverse colon is most suggestive of nonspecific infectious or inflammatory   colitis. 2. New irregular wedge-shaped heterogeneous hypodensity involving the   superomedial spleen. Findings are concerning for splenic infarction. 3. Stable appearance of cystic lesion involving the left adnexa measuring up   to 7.8 cm, which has shown interval growth over time. Given size, MRI with   IV contrast or surgical evaluation is suggested. Problem List  Active Problems:    Splenic infarct    Abdominal pain, right upper quadrant    Acute colitis    Infection of superficial incisional surgical site after procedure    Obesity, Class I, BMI 30-34.9    Diabetes education, encounter for  Resolved Problems:    * No resolved hospital problems. *       Assessment & Plan:     Anticoagulation, on  eliquis  ; FU  Hematology   Splenic infarct ;  Eliquis per hematology   GI bleed ;Possibly from hemorrhoids,  Resolved;   S/P EGD / colon - 10/10/18 noted  For   gastritis and incomplete colonoscopy ; Stable  hemoglobin,   FU GI   Internal/external hemorrhoids, no surgical intervention recommended by surgery at present , medical management with goal of 1 soft bowel movement per day.    FU surgery   Postoperative wound infection -  left groin - S/P  outpatient I & D ; Treated with  oral antibiotic per ID   Chronic anemia, multifactorial, follow-up  PCP    Hypokalemia, replaced  Colitis ; ?  ischemic;  resolved ; FU  GI     coag negative staph positive blood culture one out of two;   contaminant per ID

## 2018-10-12 NOTE — PLAN OF CARE
Problem: Falls - Risk of:  Goal: Will remain free from falls  Will remain free from falls   Outcome: Ongoing  Patient remains absent from falls at this time. Remains alert and oriented, up to chair at this time with call light and belongings in reach. Non-slip footwear on and fall precautions in place. Patient encouraged to use call light to request assistance, v/u.  Will continue to monitor. Problem: Pain:  Goal: Pain level will decrease  Pain level will decrease   Outcome: Ongoing  Patient denies any pain at this time. Will continue to monitor.

## 2018-10-12 NOTE — PROGRESS NOTES
Oncology and Hematology Care   Progress Note      10/11/2018 2:15 PM        Name: Mica Butler . Admitted: 10/1/2018    SUBJECTIVE:  Pt reports feeling better, would like to go home. No bleeding, abdominal pain is better.     Reviewed interval ancillary notes    Current Medications    [START ON 10/12/2018] aspirin chewable tablet 81 mg Daily   apixaban (ELIQUIS) tablet 2.5 mg BID   diatrizoate meglumine-sodium (GASTROGRAFIN) 66-10 % solution 480 mL ONCE PRN   lidocaine (LIDODERM) 5 % 1 patch Daily   glimepiride (AMARYL) tablet 1 mg Daily before lunch   ferrous sulfate tablet 325 mg BID WC   vitamin B-12 (CYANOCOBALAMIN) tablet 500 mcg Daily   torsemide (DEMADEX) tablet 20 mg Daily   polyethylene glycol (GLYCOLAX) packet 17 g Daily   oxyCODONE (ROXICODONE) immediate release tablet 5 mg Q4H PRN   acetaminophen (TYLENOL) tablet 650 mg Q4H PRN   amoxicillin-clavulanate (AUGMENTIN) 875-125 MG per tablet 1 tablet 2 times per day   lactobacillus (CULTURELLE) capsule 1 capsule BID WC   insulin lispro (HUMALOG) injection pen 0-6 Units TID WC   insulin lispro (HUMALOG) injection pen 0-3 Units Nightly   nystatin (MYCOSTATIN) 840041 UNIT/ML suspension 500,000 Units 4x Daily   carvedilol (COREG) tablet 3.125 mg BID WC   digoxin (LANOXIN) tablet 0.0625 mg Daily   pravastatin (PRAVACHOL) tablet 40 mg Nightly   sodium chloride flush 0.9 % injection 10 mL 2 times per day   sodium chloride flush 0.9 % injection 10 mL PRN   ondansetron (ZOFRAN) injection 4 mg Q6H PRN   potassium chloride 10 mEq/100 mL IVPB (Peripheral Line) PRN   magnesium sulfate 1 g in dextrose 5% 100 mL IVPB PRN   glucose (GLUTOSE) 40 % oral gel 15 g PRN   dextrose 50 % solution 12.5 g PRN   glucagon (rDNA) injection 1 mg PRN   dextrose 5 % solution PRN   montelukast (SINGULAIR) tablet 10 mg Nightly   ondansetron (ZOFRAN) injection 4 mg Once       Objective:  BP (!) 129/91   Pulse 83   Temp 98 °F (36.7 °C) (Oral)   Resp 18   Ht 5' 8\" (1.727 m) of hemorrhoids. Anemia. Hgb 9.2 today    History of eosinophilic gastritis. Treated intermittently with steroids    OK for discharge from oncology perspective.   Needs f/u with Dr. Norma Sharma in 1-2 weeks.        Elsi Campoverde.    Patient stable agree with note above

## 2018-10-13 ENCOUNTER — CARE COORDINATION (OUTPATIENT)
Dept: CASE MANAGEMENT | Age: 73
End: 2018-10-13

## 2018-10-13 DIAGNOSIS — D73.5 SPLENIC INFARCT: Primary | ICD-10-CM

## 2018-10-13 NOTE — CARE COORDINATION
10/19/2018 9:45 AM Jimmy Cordova MD FF Cardio [facility]   10/23/2018 8:00 AM SCHEDULE, SHERYL PHONE TRANSMISSION FF Cardio [facility]   10/25/2018 10:00 AM [facility] Bobby [facility]   10/26/2018 8:00 AM Richi Willard MD [facility]   11/13/2018 8:00 AM Madelin Villafuerte MD FF Cardio [facility]     [id number] Transition Coordinator  [phone]  [email or web address]

## 2018-10-15 LAB
EKG ATRIAL RATE: 94 BPM
EKG DIAGNOSIS: NORMAL
EKG P-R INTERVAL: 226 MS
EKG Q-T INTERVAL: 366 MS
EKG QRS DURATION: 90 MS
EKG QTC CALCULATION (BAZETT): 457 MS
EKG R AXIS: 77 DEGREES
EKG T AXIS: 180 DEGREES
EKG VENTRICULAR RATE: 94 BPM

## 2018-10-16 ENCOUNTER — CARE COORDINATION (OUTPATIENT)
Dept: CASE MANAGEMENT | Age: 73
End: 2018-10-16

## 2018-10-19 ENCOUNTER — OFFICE VISIT (OUTPATIENT)
Dept: CARDIOLOGY CLINIC | Age: 73
End: 2018-10-19
Payer: MEDICARE

## 2018-10-19 ENCOUNTER — CARE COORDINATION (OUTPATIENT)
Dept: CASE MANAGEMENT | Age: 73
End: 2018-10-19

## 2018-10-19 VITALS
DIASTOLIC BLOOD PRESSURE: 72 MMHG | BODY MASS INDEX: 31.52 KG/M2 | RESPIRATION RATE: 14 BRPM | SYSTOLIC BLOOD PRESSURE: 112 MMHG | OXYGEN SATURATION: 96 % | HEART RATE: 99 BPM | HEIGHT: 68 IN | WEIGHT: 208 LBS

## 2018-10-19 DIAGNOSIS — I20.0 UNSTABLE ANGINA (HCC): Primary | ICD-10-CM

## 2018-10-19 DIAGNOSIS — I25.118 CORONARY ARTERY DISEASE OF NATIVE ARTERY OF NATIVE HEART WITH STABLE ANGINA PECTORIS (HCC): ICD-10-CM

## 2018-10-19 DIAGNOSIS — E78.2 MIXED HYPERLIPIDEMIA: ICD-10-CM

## 2018-10-19 DIAGNOSIS — Z95.1 STATUS POST AORTO-CORONARY ARTERY BYPASS GRAFT: ICD-10-CM

## 2018-10-19 DIAGNOSIS — I15.9 SECONDARY HYPERTENSION: ICD-10-CM

## 2018-10-19 DIAGNOSIS — Z95.3 S/P MITRAL VALVE REPLACEMENT WITH BIOPROSTHETIC VALVE: ICD-10-CM

## 2018-10-19 DIAGNOSIS — I47.1 PAROXYSMAL SVT (SUPRAVENTRICULAR TACHYCARDIA) (HCC): ICD-10-CM

## 2018-10-19 DIAGNOSIS — I48.91 ATRIAL FIBRILLATION, UNSPECIFIED TYPE (HCC): ICD-10-CM

## 2018-10-19 PROCEDURE — 99214 OFFICE O/P EST MOD 30 MIN: CPT | Performed by: INTERNAL MEDICINE

## 2018-10-19 RX ORDER — ROSUVASTATIN CALCIUM 10 MG/1
10 TABLET, COATED ORAL DAILY
Qty: 30 TABLET | Refills: 3 | Status: SHIPPED | OUTPATIENT
Start: 2018-10-19 | End: 2018-10-26 | Stop reason: SDUPTHER

## 2018-10-19 RX ORDER — WARFARIN SODIUM 5 MG/1
5 TABLET ORAL DAILY
Qty: 30 TABLET | Refills: 3
Start: 2018-10-19 | End: 2019-04-05 | Stop reason: SDUPTHER

## 2018-10-19 NOTE — PROGRESS NOTES
[address]   Cardiac Followup    Referring Provider:  Graciela Ga MD     Chief Complaint   Patient presents with    1 Month Follow-Up    Coronary Artery Disease    Hyperlipidemia    Hypertension        History of Present Illness:  Mrs Zoraida Gomez is  A 71 y.o. female seen as a hospital follow up. She has a history of  CABG/MVR(bioprosthetic mitral valve) on 18, atrial fibrillation, htn,hchol, diabetes. She  was treated with clindamycin for left thigh infected hematoma 18. Her INR went up to 8. She developed  Rectal bleeding, had a questionable splenic infarct, while on coumadin. ( thought that  Dr Anatoliy Maharaj thought this was from calcium) She was evaluated  By GI,oncology,  And  Coumadin switched to eliquis,She has been treated for diverticulitis with antibiotics for 10 days      She is here today with her . She feels she is recovering well, still on antibiotics for colitis pain. Her  is  concerned that she in on  eliquis and not coumadin with known recent MVR (he has been reading up on it). She has no chest pain,  Change in exertional shortness of breath palpitations or dizziness. Past Medical History:   has a past medical history of Asthma; Atrial fibrillation ([address] Eosinophilia; Hemoptysis; HIGH CHOLESTEROL; Hx of blood clots; Hypertension; Irregular heart beat; Other specified gastritis without mention of hemorrhage; Palpitations; Skin cancer; and Type II or unspecified type diabetes mellitus without mention of complication, not stated as uncontrolled. Surgical History:   has a past surgical history that includes Cholecystectomy;  section; Colonoscopy (2017); skin biopsy; bronchoscopy (2016); Coronary artery bypass graft (2018); Mitral valve replacement (2018); transesophageal echocardiogram (2018); Tunneled venous catheter placement (Left, 2018); Cardiac catheterization (2018);  Insertable Cardiac Monitor No Abnormalities Noted  Neurological/Psychiatric:  · Alert and oriented in all spheres  · Moves all extremities well  · Exhibits normal gait balance and coordination  · No abnormalities of mood, affect, memory, mentation, or behavior are noted    10/3/18 TTE  Summary   -Moderate concentric left ventricular hypertrophy.   -Low normal global ejection fraction estimated at 50%.  -Apical akinesis noted.   -Left atrial enlargement based on volume index.   -Mild aortic stenosis with a peak velocity of 2.18m/s and a mean pressure   gradient of 12 mmHg. The aortic valve area is estimated at 1.22 cm. No   significant regurgitation noted.   -The bioprosthetic mitral valve is well seated with peak velocity of 2.8 m/s   and a mean gradient of 12 mmHg. No significant regurgitation noted.   -There is moderate tricuspid regurgitation with a RVSP estimation of 53   mmHg.   - Grade II diastolic dysfunction with elevated LV filling pressures.   -No obvious signs for thrombus utilizing optison imaging enhancing agent. Assessment:   CAD-CABG 8/21/18  CABG x 3 LIMA-LAD, SV-D1-PLV; modified left and right sided maze procedure with obliteration of WAYNE     MVR --8/21/18--MVR with 27 mm [employer] Cinch tissue valve    Atrial fibrillation-- rate is  Controlled on eliquis  Previous PE years ago--  Discussed with Dr Kwaku Mclean--all suspected calcium and not splenic infarct.  Will switch from eliquis back to coumadin    htn--/72 (Site: Left Upper Arm, Position: Sitting, Cuff Size: Large Adult)   Pulse 99   Resp 14   Ht 5' 8\" (1.727 m)   Wt 208 lb (94.3 kg)   SpO2 96%   BMI 31.63 kg/m²       hchol--not to goal, recommend starting coenzyme q 10, stopping pravachol and start crestor 10 mg daily, will repeat LLL  In 3 months    dvt --last occurrence 35 years ago      Plan:  Stop pravachol  Start crestor 10 mg daily repeat LLL in 3 months  Plain gxt for rehab  5 days after antibiotics are done-  Stop eliquis, the next day

## 2018-10-19 NOTE — LETTER
 carvedilol (COREG) 3.125 MG tablet Take 1 tablet by mouth 2 times daily (with meals) 180 tablet 1    Digoxin 62.5 MCG TABS Take 0.0625 mg by mouth daily (Patient taking differently: Take 0.125 mg by mouth daily ) 30 tablet 3    Blood Glucose Monitoring Suppl (FREESTYLE LITE) GAETANO 1 Device by Does not apply route 4 times daily Patient to check blood sugar 4 times a day and PRN, she is treated with multiple daily injections of insulin that require correction dosing ;A1C 8.1 ; ICD code-E11.65 ,Z79.4 1 Device 0    blood glucose test strips (FREESTYLE LITE) strip 1 each by Does not apply route 4 times daily Patient to check blood sugar 4 times a day and PRN, she is treated with multiple daily injections of insulin that require correction dosing ;A1C 8.1 ; ICD code-E11.65 ,Z79.4 100 each 5    FREESTYLE LANCETS MISC 1 each by Does not apply route 4 times daily Patient to check blood sugar 4 times a day and PRN, she is treated with multiple daily injections of insulin that require correction dosing ;A1C 8.1 ; ICD code-E11.65 ,Z79.4 100 each 5    blood glucose test strips (ASCENSIA AUTODISC VI;ONE TOUCH ULTRA TEST VI) strip 1 each by Does not apply route 4 times daily (before meals and nightly) Patient to check blood sugar 4 times a day and PRN, she is treated with multiple daily injections of insulin that require correction dosing.  LABA1C  8.1 08/16/2018 ICD code- E11.65, Z79.4 100 each 3    montelukast (SINGULAIR) 10 MG tablet TAKE 1 TABLET NIGHTLY 90 tablet 3    pravastatin (PRAVACHOL) 40 MG tablet Take 1 tablet by mouth nightly 90 tablet 3    polyethylene glycol (GLYCOLAX) powder FILL DOSING CUP TO MARKED LINE (17 GRAMS) THEN MIX IN LIQUID AND DRINK DAILY 1581 g 3    exenatide (BYETTA 10 MCG PEN) 10 MCG/0.04ML injection Inject 0.04 mLs into the skin 2 times daily (with meals) (Patient taking differently: Inject 10 mcg into the skin 2 times daily (with meals) ) 3 pen 3  nystatin (MYCOSTATIN) 187969 UNIT/ML suspension 1 teaspoon 4 times daily x 5 d 120 mL 5    albuterol sulfate HFA (PROAIR HFA) 108 (90 BASE) MCG/ACT inhaler Inhale 2 puffs into the lungs every 6 hours as needed for Wheezing 3 Inhaler 3    insulin glargine (LANTUS) 100 UNIT/ML injection pen Inject 10 Units into the skin 2 times daily 60 units AM and 40 units Bedtime 30 pen 3     No current facility-administered medications for this visit. Allergies:  Xpnwpboq-rjeqvkv-gkiwuh [fluocinolone]; Ciprofloxacin; Diovan [valsartan]; Flagyl [metronidazole]; Metformin and related [metformin and related]; Benazepril; Morphine; Saxagliptin; and Levaquin [levofloxacin]     Review of Systems:   · Constitutional: there has been no unanticipated weight loss. There's been no change in energy level, sleep pattern, or activity level. · Eyes: No visual changes or diplopia. No scleral icterus. · ENT: No Headaches, hearing loss or vertigo. No mouth sores or sore throat. · Cardiovascular: Reviewed in HPI  · Respiratory: No cough or wheezing, no sputum production. No hematemesis. · Gastrointestinal: No abdominal pain, appetite loss, blood in stools. No change in bowel or bladder habits. · Genitourinary: No dysuria, trouble voiding, or hematuria. · Musculoskeletal:  No gait disturbance, weakness or joint complaints. · Integumentary: No rash or pruritis. · Neurological: No headache, diplopia, change in muscle strength, numbness or tingling. No change in gait, balance, coordination, mood, affect, memory, mentation, behavior. · Psychiatric: No anxiety, no depression. · Endocrine: No malaise, fatigue or temperature intolerance. No excessive thirst, fluid intake, or urination. No tremor. · Hematologic/Lymphatic: No abnormal bruising or bleeding, blood clots or swollen lymph nodes. · Allergic/Immunologic: No nasal congestion or hives.     Physical Examination:    Vitals:    10/19/18 0959   BP: 112/72   Pulse: 99 Resp: 14   SpO2: 96%        Constitutional and General Appearance: NAD   Respiratory:  · Normal excursion and expansion without use of accessory muscles  · Resp Auscultation: Normal breath sounds without dullness  Cardiovascular:  · The apical impulses not displaced  · Heart tones are crisp and normal  · Cervical veins are not engorged  · The carotid upstroke is normal in amplitude and contour without delay or bruit  · Normal G9U5, No S3, systolic Murmur  · Peripheral pulses are symmetrical and full  · There is no clubbing, cyanosis of the extremities. · One plus edema  · Femoral Arteries: 2+ and equal  · Pedal Pulses: 2+ and equal   Abdomen:  · No masses or tenderness  · Liver/Spleen: No Abnormalities Noted  Neurological/Psychiatric:  · Alert and oriented in all spheres  · Moves all extremities well  · Exhibits normal gait balance and coordination  · No abnormalities of mood, affect, memory, mentation, or behavior are noted    10/3/18 TTE  Summary   -Moderate concentric left ventricular hypertrophy.   -Low normal global ejection fraction estimated at 50%.  -Apical akinesis noted.   -Left atrial enlargement based on volume index.   -Mild aortic stenosis with a peak velocity of 2.18m/s and a mean pressure   gradient of 12 mmHg. The aortic valve area is estimated at 1.22 cm. No   significant regurgitation noted.   -The bioprosthetic mitral valve is well seated with peak velocity of 2.8 m/s   and a mean gradient of 12 mmHg. No significant regurgitation noted.   -There is moderate tricuspid regurgitation with a RVSP estimation of 53   mmHg.   - Grade II diastolic dysfunction with elevated LV filling pressures.   -No obvious signs for thrombus utilizing optison imaging enhancing agent.   Assessment:   CAD-CABG 8/21/18  CABG x 3 LIMA-LAD, SV-D1-PLV; modified left and right sided maze procedure with obliteration of WAYNE     MVR --8/21/18--MVR with 27 mm Medtronic Cinch tissue valve

## 2018-10-19 NOTE — CARE COORDINATION
Attempted to reach pt for care transition follow up call. Left message requesting call back.     Eladio Luong [facility]   [phone]

## 2018-10-22 ENCOUNTER — CARE COORDINATION (OUTPATIENT)
Dept: CASE MANAGEMENT | Age: 73
End: 2018-10-22

## 2018-10-22 NOTE — CARE COORDINATION
Iván [facility] Transitions Follow Up Call    10/22/2018    Patient: Ascencion Munson  Patient : 1945   MRN: [id number]  Reason for Admission: There are no discharge diagnoses documented for the most recent discharge.   Discharge Date: 10/12/18 RARS: Readmission Risk Score: 29       2nd attempt at a Follow up call, left contact info on         Follow Up  Future Appointments  Date Time Provider Tawana Young   10/23/2018 8:00 AM SCHEDULE, [place] PHONE TRANSMISSION  Cardio [facility]   10/24/2018 1:00 PM [facility] STRESS ROOM 1 [facility] STRESS None   10/25/2018 10:00 AM Kimberly Zhang MD [facility]TS [facility]   10/26/2018 8:00 AM Matt Romero MD [facility]   2018 8:00 AM Dinora Reich MD  Cardio [facility]       Fabricio Park RN

## 2018-10-23 ENCOUNTER — NURSE ONLY (OUTPATIENT)
Dept: CARDIOLOGY CLINIC | Age: 73
End: 2018-10-23
Payer: MEDICARE

## 2018-10-23 DIAGNOSIS — I47.1 PAROXYSMAL SVT (SUPRAVENTRICULAR TACHYCARDIA) (HCC): ICD-10-CM

## 2018-10-23 DIAGNOSIS — Z45.09 ENCOUNTER FOR ELECTRONIC ANALYSIS OF REVEAL EVENT RECORDER: Primary | ICD-10-CM

## 2018-10-23 PROCEDURE — 93299 PR REM INTERROG ICPMS/SCRMS <30 D TECH REVIEW: CPT | Performed by: INTERNAL MEDICINE

## 2018-10-23 PROCEDURE — 93298 REM INTERROG DEV EVAL SCRMS: CPT | Performed by: INTERNAL MEDICINE

## 2018-10-23 NOTE — LETTER
[facility] [facility] [phone]  [address] [address] [phone]    Pacemaker/Defibrillator Clinic          10/24/18        Ruth Álvarez  [address] [address]        Dear Ruth Álvarez    This letter is to inform you that we received the transmission from your monitor at home that checks your pacemaker and/or defibrillator, or implanted heart monitor. The next date your monitor will automatically transmit will be 12-18-18. Please do not send additional routine transmissions unless specifically requested. Your device and monitor are wireless and most transmit cellularly, but please periodically check your monitor is still plugged in to the electrical outlet. If you still use the telephone land line to send please ensure the connection to the phone abel is secure. This will help to ensure successful automatic transmissions in the future. Also, the monitor needs to be close to you while sleeping at night. Please be aware that the remote device transmission sites are periodically monitored only during regular business hours during which simultaneous in-office device clinics are being run. If your transmission requires attention, we will contact you as soon as possible. Thank you.             Beto 81

## 2018-10-24 ENCOUNTER — CARE COORDINATION (OUTPATIENT)
Dept: FAMILY MEDICINE CLINIC | Age: 73
End: 2018-10-24

## 2018-10-24 ENCOUNTER — HOSPITAL ENCOUNTER (OUTPATIENT)
Dept: NON INVASIVE DIAGNOSTICS | Age: 73
Discharge: HOME | End: 2018-10-24
Payer: MEDICARE

## 2018-10-24 DIAGNOSIS — I25.10 CORONARY ATHEROSCLEROSIS DUE TO LIPID RICH PLAQUE: Primary | ICD-10-CM

## 2018-10-24 DIAGNOSIS — Z95.3 S/P MITRAL VALVE REPLACEMENT WITH BIOPROSTHETIC VALVE: ICD-10-CM

## 2018-10-24 DIAGNOSIS — Z95.1 STATUS POST AORTO-CORONARY ARTERY BYPASS GRAFT: ICD-10-CM

## 2018-10-24 DIAGNOSIS — I25.83 CORONARY ATHEROSCLEROSIS DUE TO LIPID RICH PLAQUE: Primary | ICD-10-CM

## 2018-10-24 PROCEDURE — 93017 CV STRESS TEST TRACING ONLY: CPT

## 2018-10-24 NOTE — PROGRESS NOTES
Patient instructed on Plain Carl Protocol Stress Test Procedure including possible side effects. Verbalizes knowledge and understanding and denies having any questions.   Tara Chowdary RN

## 2018-10-25 ENCOUNTER — TELEPHONE (OUTPATIENT)
Dept: CARDIOLOGY CLINIC | Age: 73
End: 2018-10-25

## 2018-10-25 ENCOUNTER — OFFICE VISIT (OUTPATIENT)
Dept: CARDIOTHORACIC SURGERY | Age: 73
End: 2018-10-25

## 2018-10-25 VITALS
TEMPERATURE: 98.2 F | OXYGEN SATURATION: 100 % | SYSTOLIC BLOOD PRESSURE: 128 MMHG | WEIGHT: 209.6 LBS | HEART RATE: 96 BPM | HEIGHT: 68 IN | BODY MASS INDEX: 31.77 KG/M2 | DIASTOLIC BLOOD PRESSURE: 70 MMHG

## 2018-10-25 DIAGNOSIS — Z95.2 S/P MITRAL VALVE REPLACEMENT: ICD-10-CM

## 2018-10-25 DIAGNOSIS — Z95.1 S/P CABG (CORONARY ARTERY BYPASS GRAFT): Primary | ICD-10-CM

## 2018-10-25 PROCEDURE — 99024 POSTOP FOLLOW-UP VISIT: CPT | Performed by: THORACIC SURGERY (CARDIOTHORACIC VASCULAR SURGERY)

## 2018-10-26 ENCOUNTER — OFFICE VISIT (OUTPATIENT)
Dept: FAMILY MEDICINE CLINIC | Age: 73
End: 2018-10-26
Payer: MEDICARE

## 2018-10-26 VITALS
SYSTOLIC BLOOD PRESSURE: 114 MMHG | BODY MASS INDEX: 31.47 KG/M2 | WEIGHT: 207 LBS | DIASTOLIC BLOOD PRESSURE: 80 MMHG | RESPIRATION RATE: 16 BRPM | OXYGEN SATURATION: 96 % | HEART RATE: 84 BPM

## 2018-10-26 DIAGNOSIS — K52.9 ACUTE COLITIS: ICD-10-CM

## 2018-10-26 DIAGNOSIS — Z98.890 S/P MAZE OPERATION FOR ATRIAL FIBRILLATION: ICD-10-CM

## 2018-10-26 DIAGNOSIS — Z86.79 S/P MAZE OPERATION FOR ATRIAL FIBRILLATION: ICD-10-CM

## 2018-10-26 DIAGNOSIS — K57.32 DIVERTICULITIS OF LARGE INTESTINE WITHOUT PERFORATION OR ABSCESS, UNSPECIFIED BLEEDING STATUS: Primary | ICD-10-CM

## 2018-10-26 DIAGNOSIS — Z23 NEED FOR INFLUENZA VACCINATION: ICD-10-CM

## 2018-10-26 DIAGNOSIS — R10.11 ABDOMINAL PAIN, RIGHT UPPER QUADRANT: ICD-10-CM

## 2018-10-26 DIAGNOSIS — I48.19 PERSISTENT ATRIAL FIBRILLATION (HCC): ICD-10-CM

## 2018-10-26 DIAGNOSIS — D50.8 IRON DEFICIENCY ANEMIA REFRACTORY TO IRON THERAPY: ICD-10-CM

## 2018-10-26 DIAGNOSIS — E11.69 DIABETES MELLITUS TYPE 2 IN OBESE (HCC): ICD-10-CM

## 2018-10-26 DIAGNOSIS — E66.9 DIABETES MELLITUS TYPE 2 IN OBESE (HCC): ICD-10-CM

## 2018-10-26 DIAGNOSIS — E53.8 B12 DEFICIENCY: ICD-10-CM

## 2018-10-26 PROCEDURE — 99495 TRANSJ CARE MGMT MOD F2F 14D: CPT | Performed by: FAMILY MEDICINE

## 2018-10-26 RX ORDER — DIGOXIN 125 MCG
125 TABLET ORAL DAILY
Qty: 90 TABLET | Refills: 1 | Status: SHIPPED | OUTPATIENT
Start: 2018-10-26 | End: 2019-01-29 | Stop reason: SDUPTHER

## 2018-10-26 RX ORDER — ROSUVASTATIN CALCIUM 10 MG/1
10 TABLET, COATED ORAL DAILY
Qty: 90 TABLET | Refills: 1 | Status: ON HOLD | OUTPATIENT
Start: 2018-10-26 | End: 2018-11-09

## 2018-10-29 ENCOUNTER — ANTI-COAG VISIT (OUTPATIENT)
Dept: FAMILY MEDICINE CLINIC | Age: 73
End: 2018-10-29

## 2018-10-29 ENCOUNTER — CARE COORDINATION (OUTPATIENT)
Dept: FAMILY MEDICINE CLINIC | Age: 73
End: 2018-10-29

## 2018-10-29 ENCOUNTER — NURSE ONLY (OUTPATIENT)
Dept: FAMILY MEDICINE CLINIC | Age: 73
End: 2018-10-29
Payer: MEDICARE

## 2018-10-29 VITALS — SYSTOLIC BLOOD PRESSURE: 110 MMHG | HEART RATE: 89 BPM | DIASTOLIC BLOOD PRESSURE: 70 MMHG

## 2018-10-29 DIAGNOSIS — Z79.01 LONG TERM CURRENT USE OF ANTICOAGULANT: Primary | ICD-10-CM

## 2018-10-29 LAB
INTERNATIONAL NORMALIZATION RATIO, POC: 1.1
PROTHROMBIN TIME, POC: NORMAL

## 2018-10-29 PROCEDURE — 85610 PROTHROMBIN TIME: CPT | Performed by: FAMILY MEDICINE

## 2018-10-30 ENCOUNTER — TELEPHONE (OUTPATIENT)
Dept: FAMILY MEDICINE CLINIC | Age: 73
End: 2018-10-30

## 2018-10-30 ENCOUNTER — TELEPHONE (OUTPATIENT)
Dept: CARDIOLOGY CLINIC | Age: 73
End: 2018-10-30

## 2018-11-02 ENCOUNTER — HOSPITAL ENCOUNTER (OUTPATIENT)
Dept: ONCOLOGY | Age: 73
Setting detail: INFUSION SERIES
Discharge: HOME | End: 2018-11-02
Payer: MEDICARE

## 2018-11-02 VITALS — DIASTOLIC BLOOD PRESSURE: 67 MMHG | TEMPERATURE: 97.5 F | HEART RATE: 78 BPM | SYSTOLIC BLOOD PRESSURE: 118 MMHG

## 2018-11-02 DIAGNOSIS — E53.8 BIOTIN-(PROPIONYL-COA-CARBOXYLASE) LIGASE DEFICIENCY: ICD-10-CM

## 2018-11-02 DIAGNOSIS — D50.8 IRON DEFICIENCY ANEMIA SECONDARY TO INADEQUATE DIETARY IRON INTAKE: Primary | ICD-10-CM

## 2018-11-02 PROCEDURE — 99211 OFF/OP EST MAY X REQ PHY/QHP: CPT

## 2018-11-02 PROCEDURE — 6360000002 HC RX W HCPCS: Performed by: FAMILY MEDICINE

## 2018-11-02 PROCEDURE — 2580000003 HC RX 258: Performed by: FAMILY MEDICINE

## 2018-11-02 PROCEDURE — 96365 THER/PROPH/DIAG IV INF INIT: CPT

## 2018-11-02 RX ORDER — SODIUM CHLORIDE 9 MG/ML
INJECTION, SOLUTION INTRAVENOUS
Status: DISCONTINUED
Start: 2018-11-02 | End: 2018-11-03 | Stop reason: HOSPADM

## 2018-11-02 RX ORDER — SODIUM CHLORIDE 0.9 % (FLUSH) 0.9 %
SYRINGE (ML) INJECTION
Status: DISCONTINUED
Start: 2018-11-02 | End: 2018-11-03 | Stop reason: HOSPADM

## 2018-11-02 RX ADMIN — IRON SUCROSE 200 MG: 20 INJECTION, SOLUTION INTRAVENOUS at 13:56

## 2018-11-02 NOTE — PROGRESS NOTES
To clinic for dose of venofer 200 mg IVPB, tolerated well. Reviewed and provided information about venofer, VU, denied any questions. To F/U with Dr. Jesus Jennings office.

## 2018-11-03 ENCOUNTER — APPOINTMENT (OUTPATIENT)
Dept: GENERAL RADIOLOGY | Age: 73
End: 2018-11-03
Payer: MEDICARE

## 2018-11-03 ENCOUNTER — HOSPITAL ENCOUNTER (EMERGENCY)
Age: 73
Discharge: HOME | End: 2018-11-03
Attending: EMERGENCY MEDICINE
Payer: MEDICARE

## 2018-11-03 VITALS
RESPIRATION RATE: 16 BRPM | OXYGEN SATURATION: 96 % | WEIGHT: 208 LBS | HEIGHT: 68 IN | SYSTOLIC BLOOD PRESSURE: 150 MMHG | HEART RATE: 85 BPM | TEMPERATURE: 97.7 F | BODY MASS INDEX: 31.52 KG/M2 | DIASTOLIC BLOOD PRESSURE: 81 MMHG

## 2018-11-03 DIAGNOSIS — R53.1 GENERALIZED WEAKNESS: Primary | ICD-10-CM

## 2018-11-03 DIAGNOSIS — R82.71 BACTERIURIA: ICD-10-CM

## 2018-11-03 DIAGNOSIS — R79.89 ELEVATED BRAIN NATRIURETIC PEPTIDE (BNP) LEVEL: ICD-10-CM

## 2018-11-03 DIAGNOSIS — Z79.01 ANTICOAGULATED ON COUMADIN: ICD-10-CM

## 2018-11-03 DIAGNOSIS — D50.9 IRON DEFICIENCY ANEMIA, UNSPECIFIED IRON DEFICIENCY ANEMIA TYPE: ICD-10-CM

## 2018-11-03 LAB
A/G RATIO: 0.7 (ref 1.1–2.2)
ABO/RH: NORMAL
ALBUMIN SERPL-MCNC: 3.1 G/DL (ref 3.4–5)
ALP BLD-CCNC: 93 U/L (ref 40–129)
ALT SERPL-CCNC: 12 U/L (ref 10–40)
ANION GAP SERPL CALCULATED.3IONS-SCNC: 10 MMOL/L (ref 3–16)
ANTIBODY SCREEN: NORMAL
APTT: 30.2 SEC (ref 26–36)
AST SERPL-CCNC: 13 U/L (ref 15–37)
BACTERIA: ABNORMAL /HPF
BASOPHILS ABSOLUTE: 0 K/UL (ref 0–0.2)
BASOPHILS RELATIVE PERCENT: 0.5 %
BILIRUB SERPL-MCNC: 0.3 MG/DL (ref 0–1)
BILIRUBIN URINE: NEGATIVE
BLOOD, URINE: NEGATIVE
BUN BLDV-MCNC: 15 MG/DL (ref 7–20)
CALCIUM SERPL-MCNC: 8.8 MG/DL (ref 8.3–10.6)
CHLORIDE BLD-SCNC: 109 MMOL/L (ref 99–110)
CLARITY: CLEAR
CO2: 24 MMOL/L (ref 21–32)
COLOR: YELLOW
CREAT SERPL-MCNC: 1 MG/DL (ref 0.6–1.2)
DIGOXIN LEVEL: 0.7 NG/ML (ref 0.8–2)
EOSINOPHILS ABSOLUTE: 0.5 K/UL (ref 0–0.6)
EOSINOPHILS RELATIVE PERCENT: 8.3 %
EPITHELIAL CELLS, UA: 4 /HPF (ref 0–5)
GFR AFRICAN AMERICAN: >60
GFR NON-AFRICAN AMERICAN: 55
GLOBULIN: 4.2 G/DL
GLUCOSE BLD-MCNC: 149 MG/DL (ref 70–99)
GLUCOSE URINE: NEGATIVE MG/DL
HCT VFR BLD CALC: 29.8 % (ref 36–48)
HEMOGLOBIN: 9.6 G/DL (ref 12–16)
HYALINE CASTS: 1 /LPF (ref 0–8)
INR BLD: 1.93 (ref 0.86–1.14)
KETONES, URINE: NEGATIVE MG/DL
LEUKOCYTE ESTERASE, URINE: ABNORMAL
LYMPHOCYTES ABSOLUTE: 1 K/UL (ref 1–5.1)
LYMPHOCYTES RELATIVE PERCENT: 18 %
MCH RBC QN AUTO: 28.7 PG (ref 26–34)
MCHC RBC AUTO-ENTMCNC: 32.2 G/DL (ref 31–36)
MCV RBC AUTO: 89.1 FL (ref 80–100)
MICROSCOPIC EXAMINATION: YES
MONOCYTES ABSOLUTE: 0.4 K/UL (ref 0–1.3)
MONOCYTES RELATIVE PERCENT: 6.9 %
NEUTROPHILS ABSOLUTE: 3.6 K/UL (ref 1.7–7.7)
NEUTROPHILS RELATIVE PERCENT: 66.3 %
NITRITE, URINE: NEGATIVE
PDW BLD-RTO: 16.8 % (ref 12.4–15.4)
PH UA: 6.5
PLATELET # BLD: 195 K/UL (ref 135–450)
PMV BLD AUTO: 8.7 FL (ref 5–10.5)
POTASSIUM SERPL-SCNC: 3.7 MMOL/L (ref 3.5–5.1)
PRO-BNP: 3927 PG/ML (ref 0–124)
PROTEIN UA: NEGATIVE MG/DL
PROTHROMBIN TIME: 22 SEC (ref 9.8–13)
RBC # BLD: 3.34 M/UL (ref 4–5.2)
RBC UA: 2 /HPF (ref 0–4)
SODIUM BLD-SCNC: 143 MMOL/L (ref 136–145)
SPECIFIC GRAVITY UA: 1.02
TOTAL PROTEIN: 7.3 G/DL (ref 6.4–8.2)
TROPONIN: <0.01 NG/ML
URINE REFLEX TO CULTURE: YES
URINE TYPE: ABNORMAL
UROBILINOGEN, URINE: 1 E.U./DL
WBC # BLD: 5.5 K/UL (ref 4–11)
WBC UA: 19 /HPF (ref 0–5)

## 2018-11-03 PROCEDURE — 85730 THROMBOPLASTIN TIME PARTIAL: CPT

## 2018-11-03 PROCEDURE — 86850 RBC ANTIBODY SCREEN: CPT

## 2018-11-03 PROCEDURE — 83880 ASSAY OF NATRIURETIC PEPTIDE: CPT

## 2018-11-03 PROCEDURE — 81001 URINALYSIS AUTO W/SCOPE: CPT

## 2018-11-03 PROCEDURE — 99285 EMERGENCY DEPT VISIT HI MDM: CPT

## 2018-11-03 PROCEDURE — 86901 BLOOD TYPING SEROLOGIC RH(D): CPT

## 2018-11-03 PROCEDURE — 84484 ASSAY OF TROPONIN QUANT: CPT

## 2018-11-03 PROCEDURE — 71046 X-RAY EXAM CHEST 2 VIEWS: CPT

## 2018-11-03 PROCEDURE — 96374 THER/PROPH/DIAG INJ IV PUSH: CPT

## 2018-11-03 PROCEDURE — 80162 ASSAY OF DIGOXIN TOTAL: CPT

## 2018-11-03 PROCEDURE — 93005 ELECTROCARDIOGRAM TRACING: CPT | Performed by: NURSE PRACTITIONER

## 2018-11-03 PROCEDURE — 80053 COMPREHEN METABOLIC PANEL: CPT

## 2018-11-03 PROCEDURE — 86900 BLOOD TYPING SEROLOGIC ABO: CPT

## 2018-11-03 PROCEDURE — 85610 PROTHROMBIN TIME: CPT

## 2018-11-03 PROCEDURE — 36415 COLL VENOUS BLD VENIPUNCTURE: CPT

## 2018-11-03 PROCEDURE — 87086 URINE CULTURE/COLONY COUNT: CPT

## 2018-11-03 PROCEDURE — 85025 COMPLETE CBC W/AUTO DIFF WBC: CPT

## 2018-11-03 PROCEDURE — 6360000002 HC RX W HCPCS: Performed by: EMERGENCY MEDICINE

## 2018-11-03 RX ORDER — FUROSEMIDE 10 MG/ML
40 INJECTION INTRAMUSCULAR; INTRAVENOUS ONCE
Status: DISCONTINUED | OUTPATIENT
Start: 2018-11-03 | End: 2018-11-03

## 2018-11-03 RX ORDER — FUROSEMIDE 10 MG/ML
80 INJECTION INTRAMUSCULAR; INTRAVENOUS ONCE
Status: COMPLETED | OUTPATIENT
Start: 2018-11-03 | End: 2018-11-03

## 2018-11-03 RX ORDER — TORSEMIDE 20 MG/1
40 TABLET ORAL DAILY
Qty: 20 TABLET | Refills: 0 | Status: SHIPPED | OUTPATIENT
Start: 2018-11-03 | End: 2018-11-13 | Stop reason: SDUPTHER

## 2018-11-03 RX ORDER — NITROFURANTOIN 25; 75 MG/1; MG/1
100 CAPSULE ORAL 2 TIMES DAILY
Qty: 20 CAPSULE | Refills: 0 | Status: ON HOLD | OUTPATIENT
Start: 2018-11-03 | End: 2018-11-09 | Stop reason: HOSPADM

## 2018-11-03 RX ADMIN — FUROSEMIDE 80 MG: 10 INJECTION, SOLUTION INTRAMUSCULAR; INTRAVENOUS at 14:58

## 2018-11-03 ASSESSMENT — ENCOUNTER SYMPTOMS
ABDOMINAL PAIN: 0
SHORTNESS OF BREATH: 0
VOMITING: 0
CONSTIPATION: 0
SORE THROAT: 0
DIARRHEA: 0
RHINORRHEA: 0
BLOOD IN STOOL: 0
NAUSEA: 0

## 2018-11-03 NOTE — ED PROVIDER NOTES
BRAIN NATRIURETIC PEPTIDE - Abnormal; Notable for the following:     Pro-BNP 3,927 (*)     All other components within normal limits    Narrative:     Performed at:  [facility]  [employer]   Phone [phone]   MICROSCOPIC URINALYSIS - Abnormal; Notable for the following:     Bacteria, UA 1+ (*)     WBC, UA 19 (*)     All other components within normal limits    Narrative:     Performed at:  [facility] [address]   Phone [phone]   DIGOXIN LEVEL - Abnormal; Notable for the following:     Digoxin Lvl 0.7 (*)     All other components within normal limits    Narrative:     Performed at:  [facility] [address]   Phone [phone]   URINE CULTURE   APTT    Narrative:     Performed at:  [facility] [address]   Phone [phone]   TROPONIN    Narrative:     Performed at:  [facility] [address]   Phone [phone]   BLOOD OCCULT STOOL DIAGNOSTIC   TYPE AND SCREEN    Narrative:     Performed at:  [facility] [address]   Phone [phone]     RADIOLOGY:     Plain x-rays were viewed by me:   XR CHEST STANDARD (2 VW)   Final Result   Stable cardiomegaly with small left pleural effusion pulmonary vascular   congestion/mild edema.            EKG:  Read by me in the absence of a cardiologist shows:  Probable atrial fibrillation with a rate of 86, PVCs, QRS duration normal, axis 65°, nonspecific ST-T wave abnormality, comparison with last EKG from one October 2018 there is a rhythm changed to A. fib and otherwise no major change     Medications administered:  Medications   furosemide (LASIX) injection 80 mg (80 mg Intravenous Given 11/3/18 1865) New Prescriptions    NITROFURANTOIN, MACROCRYSTAL-MONOHYDRATE, (MACROBID) 100 MG CAPSULE    Take 1 capsule by mouth 2 times daily for 10 days   Torsemide    FOLLOW UP:    Jimmy Cordova MD  [address] [address] [address]  [phone]    Schedule an appointment as soon as possible for a visit in 3 days  For a recheck    [facility] Emergency Department  [address]  Go to   If symptoms worsen    FINAL IMPRESSION:    1. Generalized weakness    2. Elevated brain natriuretic peptide (BNP) level    3. Iron deficiency anemia, unspecified iron deficiency anemia type    4. Anticoagulated on Coumadin    5.  Bacteriuria         Pedrito Montgomery MD  11/03/18 8839

## 2018-11-03 NOTE — ED PROVIDER NOTES
[address] [facility]  eMERGENCY dEPARTMENT eNCOUnter        Pt Name: Scar Deras  MRN: [id number]  [place] 1945  Date of evaluation: 11/3/2018  Provider: SOLEDAD Crawford - JOSE L  PCP: Kendy Kelley MD      [address] [place] [address]       Chief Complaint   Patient presents with    Fatigue     Patient in with complaints of feeling weak. States she has low iron. Had blood transfusion yesterday. States that she is unable to walk long distances and feels weak. HISTORY OF PRESENT ILLNESS   (Location/Symptom, Timing/Onset,Context/Setting, Quality, Duration, Modifying Factors, Severity)  Note limiting factors. Scar Deras is a 71 y.o. female who presents emergency Department with concern for generalized weakness. The patient believes that this is secondary to anemia. She reports her hemoglobin was around 8 g about a week ago. She received an iron infusion yesterday for this but does not feel any better. She thinks that she needs a blood transfusion. She reports that she can barely walk from the kitchen to the couch due to weakness. There is no shortness of breath or chest pain associated with this. She is anticoagulated on Coumadin. She denies any bloody or black tarry stools. She also denies fever, rash, headaches, dizziness, chest pain, shortness of breath, cough, congestion, abdominal pain, nausea, vomiting, diarrhea, constipation, or painful urination. One friend/family member at bedside. Nursing Notes triage note reviewed and agreed with or any disagreements were addressed  in the HPI. REVIEW OF SYSTEMS    (2-9 systems for level 4, 10 or more for level 5)     Review of Systems   Constitutional: Positive for fatigue. Negative for chills and fever. HENT: Negative for postnasal drip, rhinorrhea and sore throat. Eyes: Negative for visual disturbance. Respiratory: Negative for shortness of breath.     Cardiovascular: Negative for chest pain.   Gastrointestinal: Negative for abdominal pain, blood in stool, constipation, diarrhea, nausea and vomiting. Genitourinary: Negative for dysuria, flank pain and hematuria. Skin: Positive for pallor. Negative for rash. Neurological: Positive for weakness. Negative for headaches. All other systems reviewed and are negative. Positives and Pertinent negatives as per HPI. Except as noted above in the ROS, all other systems were reviewed and negative.        PAST MEDICAL HISTORY     Past Medical History:   Diagnosis Date    Asthma     Atrial fibrillation (HCC)     Eosinophilia     Hemoptysis     HIGH CHOLESTEROL     Hx of blood clots     Hypertension     Irregular heart beat     Other specified gastritis without mention of hemorrhage     Palpitations     Skin cancer     basal and squamous    Type II or unspecified type diabetes mellitus without mention of complication, not stated as uncontrolled          SURGICAL HISTORY       Past Surgical History:   Procedure Laterality Date    BRONCHOSCOPY  07/18/2016    Dr. Kaylynn Sanchez - brushings from [address] [place] [address],[facility]  08/16/2018    Dr. Shea Hayward  02/07/2017    Dr. Jessie Shin - sigmoid diverticulosis, polypectomies x3    COLONOSCOPY  01/17/2014    Dr. Jessie Shin - sigmoid diverticulosis, polypectomies x3, internal hemorrhoids    COLONOSCOPY  10/10/2018    w/biopsy performed by Kaushal Hassan MD at [address]  08/21/2018    Dr. Mayer - x3 (LIMA-LAD, L SV-D1-PLV) modified BL MAZE procedure w/obliteration of WAYNE using 45mm AtriClip    HYSTERECTOMY      INSERTABLE CARDIAC MONITOR Left 08/16/2018    Dr. Garima Ayoub - Ada Noss SN# [id number] Medtronic    MITRAL VALVE REPLACEMENT  08/21/2018    Dr. Mayer - 27mm Medtronic Cin tissue valve    SKIN BIOPSY      TRANSESOPHAGEAL ECHOCARDIOGRAM  08/21/2018    during CABG/MVR    TUNNELED VENOUS (1.727 m) 208 lb (94.3 kg)       Physical Exam   Constitutional: She is oriented to person, place, and time. She appears well-developed and well-nourished. No distress. HENT:   Head: Normocephalic and atraumatic. Eyes: Right eye exhibits no discharge. Left eye exhibits no discharge. No scleral icterus. Neck: Normal range of motion. Neck supple. Cardiovascular: Normal rate, regular rhythm, normal heart sounds and intact distal pulses. Exam reveals no gallop and no friction rub. No murmur heard. Pulmonary/Chest: Effort normal and breath sounds normal. No stridor. No respiratory distress. She has no wheezes. She has no rales. She exhibits no tenderness. Abdominal: Soft. Bowel sounds are normal. She exhibits no distension and no mass. There is no tenderness. There is no rebound and no guarding. Musculoskeletal: Normal range of motion. She exhibits no edema or tenderness. Neurological: She is alert and oriented to person, place, and time. Coordination normal.   Skin: Skin is warm and dry. She is not diaphoretic. There is pallor. Psychiatric: She has a normal mood and affect. Her behavior is normal.   Nursing note and vitals reviewed.       DIAGNOSTIC RESULTS   LABS:    Labs Reviewed   CBC WITH AUTO DIFFERENTIAL - Abnormal; Notable for the following:        Result Value    RBC 3.34 (*)     Hemoglobin 9.6 (*)     Hematocrit 29.8 (*)     RDW 16.8 (*)     All other components within normal limits    Narrative:     Performed at:  [facility] [address], [place], [address] [facility]   Phone [phone]   URINE RT REFLEX TO CULTURE - Abnormal; Notable for the following:     Leukocyte Esterase, Urine MODERATE (*)     All other components within normal limits    Narrative:     Performed at:  [facility]  [facility]   Phone [phone]   COMPREHENSIVE METABOLIC PANEL - Abnormal; Notable for the following:     Glucose weakness    2. Elevated brain natriuretic peptide (BNP) level    3. Iron deficiency anemia, unspecified iron deficiency anemia type    4. Anticoagulated on Coumadin          DISPOSITION/PLAN   DISPOSITION Decision To Discharge 11/03/2018 01:57:21 PM        The patient tolerated their visit well. They were seen and evaluated by the attending physician, Pedrito Montgomery, * , who agreed with the assessment and plan. The patient and / or the family were informed of the results of any tests, a time was given to answer questions, a plan was proposed and they agreed with plan.         (Please note that portions of this note were completed with a voice recognition program.  Efforts were made to edit the dictations but occasionally wordsare mis-transcribed.)    SOLEDAD Romero CNP (electronically signed)        SOLEDAD Romero CNP  11/03/18 9661

## 2018-11-04 LAB — URINE CULTURE, ROUTINE: NORMAL

## 2018-11-04 PROCEDURE — 93010 ELECTROCARDIOGRAM REPORT: CPT | Performed by: INTERNAL MEDICINE

## 2018-11-05 ENCOUNTER — NURSE ONLY (OUTPATIENT)
Dept: FAMILY MEDICINE CLINIC | Age: 73
End: 2018-11-05
Payer: MEDICARE

## 2018-11-05 ENCOUNTER — CARE COORDINATION (OUTPATIENT)
Dept: FAMILY MEDICINE CLINIC | Age: 73
End: 2018-11-05

## 2018-11-05 VITALS
DIASTOLIC BLOOD PRESSURE: 64 MMHG | OXYGEN SATURATION: 98 % | HEART RATE: 103 BPM | RESPIRATION RATE: 16 BRPM | SYSTOLIC BLOOD PRESSURE: 110 MMHG

## 2018-11-05 DIAGNOSIS — I48.91 ATRIAL FIBRILLATION, UNSPECIFIED TYPE (HCC): Primary | ICD-10-CM

## 2018-11-05 LAB
INTERNATIONAL NORMALIZATION RATIO, POC: 2
PROTHROMBIN TIME, POC: NORMAL

## 2018-11-05 PROCEDURE — 85610 PROTHROMBIN TIME: CPT | Performed by: FAMILY MEDICINE

## 2018-11-06 ENCOUNTER — HOSPITAL ENCOUNTER (INPATIENT)
Age: 73
LOS: 4 days | Discharge: HOME | DRG: 064 | End: 2018-11-10
Attending: EMERGENCY MEDICINE | Admitting: HOSPITALIST
Payer: MEDICARE

## 2018-11-06 ENCOUNTER — APPOINTMENT (OUTPATIENT)
Dept: CT IMAGING | Age: 73
DRG: 064 | End: 2018-11-06
Payer: MEDICARE

## 2018-11-06 ENCOUNTER — APPOINTMENT (OUTPATIENT)
Dept: GENERAL RADIOLOGY | Age: 73
DRG: 064 | End: 2018-11-06
Payer: MEDICARE

## 2018-11-06 DIAGNOSIS — A41.9 SEPSIS, DUE TO UNSPECIFIED ORGANISM: Primary | ICD-10-CM

## 2018-11-06 DIAGNOSIS — G93.40 ACUTE ENCEPHALOPATHY: ICD-10-CM

## 2018-11-06 LAB
A/G RATIO: 0.6 (ref 1.1–2.2)
ALBUMIN SERPL-MCNC: 3 G/DL (ref 3.4–5)
ALP BLD-CCNC: 180 U/L (ref 40–129)
ALT SERPL-CCNC: 43 U/L (ref 10–40)
AMMONIA: 22 UMOL/L (ref 11–51)
AMPHETAMINE SCREEN, URINE: NORMAL
ANION GAP SERPL CALCULATED.3IONS-SCNC: 16 MMOL/L (ref 3–16)
AST SERPL-CCNC: 88 U/L (ref 15–37)
BACTERIA: ABNORMAL /HPF
BARBITURATE SCREEN URINE: NORMAL
BASE EXCESS ARTERIAL: 4.5 MMOL/L (ref -3–3)
BASOPHILS ABSOLUTE: 0 K/UL (ref 0–0.2)
BASOPHILS RELATIVE PERCENT: 0.3 %
BENZODIAZEPINE SCREEN, URINE: NORMAL
BILIRUB SERPL-MCNC: 0.8 MG/DL (ref 0–1)
BILIRUBIN URINE: NEGATIVE
BLOOD, URINE: NEGATIVE
BUN BLDV-MCNC: 20 MG/DL (ref 7–20)
CALCIUM SERPL-MCNC: 8.8 MG/DL (ref 8.3–10.6)
CANNABINOID SCREEN URINE: NORMAL
CARBOXYHEMOGLOBIN ARTERIAL: 1.7 % (ref 0–1.5)
CHLORIDE BLD-SCNC: 95 MMOL/L (ref 99–110)
CLARITY: CLEAR
CO2: 24 MMOL/L (ref 21–32)
COCAINE METABOLITE SCREEN URINE: NORMAL
COLOR: YELLOW
CREAT SERPL-MCNC: 1.4 MG/DL (ref 0.6–1.2)
DIGOXIN LEVEL: <0.3 NG/ML (ref 0.8–2)
EOSINOPHILS ABSOLUTE: 0.3 K/UL (ref 0–0.6)
EOSINOPHILS RELATIVE PERCENT: 2.3 %
EPITHELIAL CELLS, UA: 2 /HPF (ref 0–5)
ETHANOL: NORMAL MG/DL (ref 0–0.08)
GFR AFRICAN AMERICAN: 45
GFR NON-AFRICAN AMERICAN: 37
GLOBULIN: 5 G/DL
GLUCOSE BLD-MCNC: 141 MG/DL (ref 70–99)
GLUCOSE BLD-MCNC: 194 MG/DL (ref 70–99)
GLUCOSE BLD-MCNC: 205 MG/DL (ref 70–99)
GLUCOSE URINE: NEGATIVE MG/DL
HCO3 ARTERIAL: 27.6 MMOL/L (ref 21–29)
HCT VFR BLD CALC: 34.3 % (ref 36–48)
HEMOGLOBIN, ART, EXTENDED: 10.1 G/DL (ref 12–16)
HEMOGLOBIN: 11.1 G/DL (ref 12–16)
HYALINE CASTS: 1 /LPF (ref 0–8)
INR BLD: 1.82 (ref 0.86–1.14)
KETONES, URINE: NEGATIVE MG/DL
LACTIC ACID, SEPSIS: 1.7 MMOL/L (ref 0.4–1.9)
LACTIC ACID, SEPSIS: 1.9 MMOL/L (ref 0.4–1.9)
LACTIC ACID: 2.1 MMOL/L (ref 0.4–2)
LEUKOCYTE ESTERASE, URINE: ABNORMAL
LYMPHOCYTES ABSOLUTE: 0.3 K/UL (ref 1–5.1)
LYMPHOCYTES RELATIVE PERCENT: 2.6 %
Lab: NORMAL
MCH RBC QN AUTO: 28.2 PG (ref 26–34)
MCHC RBC AUTO-ENTMCNC: 32.3 G/DL (ref 31–36)
MCV RBC AUTO: 87.2 FL (ref 80–100)
METHADONE SCREEN, URINE: NORMAL
METHEMOGLOBIN ARTERIAL: 0.3 %
MICROSCOPIC EXAMINATION: YES
MONOCYTES ABSOLUTE: 0.7 K/UL (ref 0–1.3)
MONOCYTES RELATIVE PERCENT: 5.2 %
NEUTROPHILS ABSOLUTE: 12 K/UL (ref 1.7–7.7)
NEUTROPHILS RELATIVE PERCENT: 89.6 %
NITRITE, URINE: NEGATIVE
O2 CONTENT ARTERIAL: 14 ML/DL
O2 SAT, ARTERIAL: 98 %
O2 THERAPY: ABNORMAL
OPIATE SCREEN URINE: NORMAL
OXYCODONE URINE: NORMAL
PCO2 ARTERIAL: 34.5 MMHG (ref 35–45)
PDW BLD-RTO: 17.7 % (ref 12.4–15.4)
PERFORMED ON: ABNORMAL
PERFORMED ON: ABNORMAL
PH ARTERIAL: 7.51 (ref 7.35–7.45)
PH UA: 5
PH UA: 5
PHENCYCLIDINE SCREEN URINE: NORMAL
PLATELET # BLD: 190 K/UL (ref 135–450)
PMV BLD AUTO: 8.3 FL (ref 5–10.5)
PO2 ARTERIAL: 88.2 MMHG (ref 75–108)
POTASSIUM SERPL-SCNC: 3.6 MMOL/L (ref 3.5–5.1)
PRO-BNP: 5435 PG/ML (ref 0–124)
PROPOXYPHENE SCREEN: NORMAL
PROTEIN UA: NEGATIVE MG/DL
PROTHROMBIN TIME: 20.8 SEC (ref 9.8–13)
RAPID INFLUENZA  B AGN: NEGATIVE
RAPID INFLUENZA A AGN: NEGATIVE
RBC # BLD: 3.93 M/UL (ref 4–5.2)
RBC UA: 1 /HPF (ref 0–4)
SODIUM BLD-SCNC: 135 MMOL/L (ref 136–145)
SPECIFIC GRAVITY UA: >1.03
TCO2 ARTERIAL: 64.2 MMOL/L
TOTAL PROTEIN: 8 G/DL (ref 6.4–8.2)
TROPONIN: 0.01 NG/ML
URINE REFLEX TO CULTURE: YES
URINE TYPE: ABNORMAL
UROBILINOGEN, URINE: 0.2 E.U./DL
WBC # BLD: 13.4 K/UL (ref 4–11)
WBC UA: 9 /HPF (ref 0–5)

## 2018-11-06 PROCEDURE — 2580000003 HC RX 258: Performed by: HOSPITALIST

## 2018-11-06 PROCEDURE — 82140 ASSAY OF AMMONIA: CPT

## 2018-11-06 PROCEDURE — 81001 URINALYSIS AUTO W/SCOPE: CPT

## 2018-11-06 PROCEDURE — 71045 X-RAY EXAM CHEST 1 VIEW: CPT

## 2018-11-06 PROCEDURE — 93010 ELECTROCARDIOGRAM REPORT: CPT | Performed by: INTERNAL MEDICINE

## 2018-11-06 PROCEDURE — 2060000000 HC ICU INTERMEDIATE R&B

## 2018-11-06 PROCEDURE — 70496 CT ANGIOGRAPHY HEAD: CPT

## 2018-11-06 PROCEDURE — G0480 DRUG TEST DEF 1-7 CLASSES: HCPCS

## 2018-11-06 PROCEDURE — 83605 ASSAY OF LACTIC ACID: CPT

## 2018-11-06 PROCEDURE — 36415 COLL VENOUS BLD VENIPUNCTURE: CPT

## 2018-11-06 PROCEDURE — 70498 CT ANGIOGRAPHY NECK: CPT

## 2018-11-06 PROCEDURE — 6370000000 HC RX 637 (ALT 250 FOR IP): Performed by: HOSPITALIST

## 2018-11-06 PROCEDURE — 85025 COMPLETE CBC W/AUTO DIFF WBC: CPT

## 2018-11-06 PROCEDURE — 83880 ASSAY OF NATRIURETIC PEPTIDE: CPT

## 2018-11-06 PROCEDURE — 80053 COMPREHEN METABOLIC PANEL: CPT

## 2018-11-06 PROCEDURE — 36600 WITHDRAWAL OF ARTERIAL BLOOD: CPT

## 2018-11-06 PROCEDURE — 6360000002 HC RX W HCPCS: Performed by: HOSPITALIST

## 2018-11-06 PROCEDURE — 87804 INFLUENZA ASSAY W/OPTIC: CPT

## 2018-11-06 PROCEDURE — 84484 ASSAY OF TROPONIN QUANT: CPT

## 2018-11-06 PROCEDURE — 6360000002 HC RX W HCPCS: Performed by: PHYSICIAN ASSISTANT

## 2018-11-06 PROCEDURE — 70450 CT HEAD/BRAIN W/O DYE: CPT

## 2018-11-06 PROCEDURE — 6370000000 HC RX 637 (ALT 250 FOR IP): Performed by: PHYSICIAN ASSISTANT

## 2018-11-06 PROCEDURE — 80307 DRUG TEST PRSMV CHEM ANLYZR: CPT

## 2018-11-06 PROCEDURE — 2580000003 HC RX 258: Performed by: PHYSICIAN ASSISTANT

## 2018-11-06 PROCEDURE — 85610 PROTHROMBIN TIME: CPT

## 2018-11-06 PROCEDURE — 87040 BLOOD CULTURE FOR BACTERIA: CPT

## 2018-11-06 PROCEDURE — 83036 HEMOGLOBIN GLYCOSYLATED A1C: CPT

## 2018-11-06 PROCEDURE — 2580000003 HC RX 258

## 2018-11-06 PROCEDURE — 82803 BLOOD GASES ANY COMBINATION: CPT

## 2018-11-06 PROCEDURE — 6360000004 HC RX CONTRAST MEDICATION: Performed by: NURSE PRACTITIONER

## 2018-11-06 PROCEDURE — 87086 URINE CULTURE/COLONY COUNT: CPT

## 2018-11-06 PROCEDURE — 93005 ELECTROCARDIOGRAM TRACING: CPT | Performed by: NURSE PRACTITIONER

## 2018-11-06 PROCEDURE — 96365 THER/PROPH/DIAG IV INF INIT: CPT

## 2018-11-06 PROCEDURE — 80162 ASSAY OF DIGOXIN TOTAL: CPT

## 2018-11-06 PROCEDURE — 96366 THER/PROPH/DIAG IV INF ADDON: CPT

## 2018-11-06 PROCEDURE — 99291 CRITICAL CARE FIRST HOUR: CPT

## 2018-11-06 RX ORDER — MONTELUKAST SODIUM 10 MG/1
10 TABLET ORAL NIGHTLY
Status: DISCONTINUED | OUTPATIENT
Start: 2018-11-06 | End: 2018-11-10 | Stop reason: HOSPADM

## 2018-11-06 RX ORDER — ACETAMINOPHEN 80 MG
TABLET,CHEWABLE ORAL
Status: COMPLETED
Start: 2018-11-06 | End: 2018-11-06

## 2018-11-06 RX ORDER — ACETAMINOPHEN 325 MG/1
650 TABLET ORAL ONCE
Status: COMPLETED | OUTPATIENT
Start: 2018-11-06 | End: 2018-11-06

## 2018-11-06 RX ORDER — SODIUM CHLORIDE 0.9 % (FLUSH) 0.9 %
10 SYRINGE (ML) INJECTION PRN
Status: DISCONTINUED | OUTPATIENT
Start: 2018-11-06 | End: 2018-11-10 | Stop reason: HOSPADM

## 2018-11-06 RX ORDER — SODIUM CHLORIDE 0.9 % (FLUSH) 0.9 %
10 SYRINGE (ML) INJECTION EVERY 12 HOURS SCHEDULED
Status: DISCONTINUED | OUTPATIENT
Start: 2018-11-06 | End: 2018-11-06

## 2018-11-06 RX ORDER — MAGNESIUM SULFATE 1 G/100ML
1 INJECTION INTRAVENOUS PRN
Status: DISCONTINUED | OUTPATIENT
Start: 2018-11-06 | End: 2018-11-10 | Stop reason: HOSPADM

## 2018-11-06 RX ORDER — LANOLIN ALCOHOL/MO/W.PET/CERES
500 CREAM (GRAM) TOPICAL DAILY
Status: DISCONTINUED | OUTPATIENT
Start: 2018-11-07 | End: 2018-11-10 | Stop reason: HOSPADM

## 2018-11-06 RX ORDER — WARFARIN SODIUM 5 MG/1
7.5 TABLET ORAL
Status: DISCONTINUED | OUTPATIENT
Start: 2018-11-06 | End: 2018-11-09

## 2018-11-06 RX ORDER — WARFARIN SODIUM 5 MG/1
5 TABLET ORAL
Status: DISCONTINUED | OUTPATIENT
Start: 2018-11-08 | End: 2018-11-10 | Stop reason: HOSPADM

## 2018-11-06 RX ORDER — 0.9 % SODIUM CHLORIDE 0.9 %
30 INTRAVENOUS SOLUTION INTRAVENOUS ONCE
Status: COMPLETED | OUTPATIENT
Start: 2018-11-06 | End: 2018-11-06

## 2018-11-06 RX ORDER — POTASSIUM CHLORIDE 7.45 MG/ML
10 INJECTION INTRAVENOUS PRN
Status: DISCONTINUED | OUTPATIENT
Start: 2018-11-06 | End: 2018-11-10 | Stop reason: HOSPADM

## 2018-11-06 RX ORDER — ONDANSETRON 2 MG/ML
4 INJECTION INTRAMUSCULAR; INTRAVENOUS EVERY 6 HOURS PRN
Status: DISCONTINUED | OUTPATIENT
Start: 2018-11-06 | End: 2018-11-10 | Stop reason: HOSPADM

## 2018-11-06 RX ORDER — SODIUM CHLORIDE 0.9 % (FLUSH) 0.9 %
10 SYRINGE (ML) INJECTION EVERY 12 HOURS SCHEDULED
Status: DISCONTINUED | OUTPATIENT
Start: 2018-11-06 | End: 2018-11-10 | Stop reason: HOSPADM

## 2018-11-06 RX ORDER — DIGOXIN 125 MCG
125 TABLET ORAL DAILY
Status: DISCONTINUED | OUTPATIENT
Start: 2018-11-07 | End: 2018-11-10 | Stop reason: HOSPADM

## 2018-11-06 RX ORDER — SODIUM CHLORIDE 0.9 % (FLUSH) 0.9 %
10 SYRINGE (ML) INJECTION PRN
Status: DISCONTINUED | OUTPATIENT
Start: 2018-11-06 | End: 2018-11-06

## 2018-11-06 RX ORDER — CARVEDILOL 3.12 MG/1
3.12 TABLET ORAL 2 TIMES DAILY WITH MEALS
Status: DISCONTINUED | OUTPATIENT
Start: 2018-11-07 | End: 2018-11-10 | Stop reason: HOSPADM

## 2018-11-06 RX ORDER — ACETAMINOPHEN 325 MG/1
650 TABLET ORAL EVERY 4 HOURS PRN
Status: DISCONTINUED | OUTPATIENT
Start: 2018-11-06 | End: 2018-11-10 | Stop reason: HOSPADM

## 2018-11-06 RX ORDER — SODIUM CHLORIDE 9 MG/ML
INJECTION, SOLUTION INTRAVENOUS
Status: COMPLETED
Start: 2018-11-06 | End: 2018-11-06

## 2018-11-06 RX ORDER — DEXTROSE MONOHYDRATE 25 G/50ML
12.5 INJECTION, SOLUTION INTRAVENOUS PRN
Status: DISCONTINUED | OUTPATIENT
Start: 2018-11-06 | End: 2018-11-10 | Stop reason: HOSPADM

## 2018-11-06 RX ORDER — ATORVASTATIN CALCIUM 10 MG/1
40 TABLET, FILM COATED ORAL DAILY
Status: DISCONTINUED | OUTPATIENT
Start: 2018-11-07 | End: 2018-11-09

## 2018-11-06 RX ORDER — NICOTINE POLACRILEX 4 MG
15 LOZENGE BUCCAL PRN
Status: DISCONTINUED | OUTPATIENT
Start: 2018-11-06 | End: 2018-11-10 | Stop reason: HOSPADM

## 2018-11-06 RX ORDER — DEXTROSE MONOHYDRATE 50 MG/ML
100 INJECTION, SOLUTION INTRAVENOUS PRN
Status: DISCONTINUED | OUTPATIENT
Start: 2018-11-06 | End: 2018-11-10 | Stop reason: HOSPADM

## 2018-11-06 RX ORDER — FAMOTIDINE 20 MG/1
20 TABLET, FILM COATED ORAL 2 TIMES DAILY
Status: DISCONTINUED | OUTPATIENT
Start: 2018-11-06 | End: 2018-11-07

## 2018-11-06 RX ADMIN — INSULIN GLARGINE 10 UNITS: 100 INJECTION, SOLUTION SUBCUTANEOUS at 22:21

## 2018-11-06 RX ADMIN — SODIUM CHLORIDE 2736 ML: 9 INJECTION, SOLUTION INTRAVENOUS at 15:35

## 2018-11-06 RX ADMIN — FAMOTIDINE 20 MG: 20 TABLET, FILM COATED ORAL at 22:20

## 2018-11-06 RX ADMIN — SODIUM CHLORIDE 250 ML: 9 INJECTION, SOLUTION INTRAVENOUS at 22:36

## 2018-11-06 RX ADMIN — Medication 10 ML: at 22:21

## 2018-11-06 RX ADMIN — Medication: at 22:46

## 2018-11-06 RX ADMIN — Medication 10 ML: at 21:51

## 2018-11-06 RX ADMIN — VANCOMYCIN HYDROCHLORIDE 1250 MG: 10 INJECTION, POWDER, LYOPHILIZED, FOR SOLUTION INTRAVENOUS at 15:45

## 2018-11-06 RX ADMIN — WARFARIN SODIUM 7.5 MG: 5 TABLET ORAL at 22:20

## 2018-11-06 RX ADMIN — CEFEPIME HYDROCHLORIDE 2 G: 2 INJECTION, POWDER, FOR SOLUTION INTRAVENOUS at 13:45

## 2018-11-06 RX ADMIN — INSULIN LISPRO 1 UNITS: 100 INJECTION, SOLUTION INTRAVENOUS; SUBCUTANEOUS at 22:20

## 2018-11-06 RX ADMIN — MONTELUKAST SODIUM 10 MG: 10 TABLET, FILM COATED ORAL at 22:20

## 2018-11-06 RX ADMIN — IOPAMIDOL 75 ML: 755 INJECTION, SOLUTION INTRAVENOUS at 14:03

## 2018-11-06 RX ADMIN — CEFEPIME HYDROCHLORIDE 1 G: 1 INJECTION, POWDER, FOR SOLUTION INTRAMUSCULAR; INTRAVENOUS at 22:21

## 2018-11-06 RX ADMIN — ACETAMINOPHEN 650 MG: 325 TABLET, FILM COATED ORAL at 15:32

## 2018-11-06 ASSESSMENT — PAIN SCALES - GENERAL: PAINLEVEL_OUTOF10: 0

## 2018-11-06 NOTE — ED PROVIDER NOTES
Mother     Heart Disease Mother     High Blood Pressure Mother           SOCIAL HISTORY       Social History     Social History    Marital status:      Spouse name: N/A    Number of children: N/A    Years of education: N/A     Social History Main Topics    Smoking status: Never Smoker    Smokeless tobacco: Never Used    Alcohol use No    Drug use: No    Sexual activity: Not Asked     Other Topics Concern    None     Social History Narrative    None       SCREENINGS   NIH Stroke Scale  NIH Stroke Scale Assessed: Yes  Interval: Baseline  Level of Consciousness (1a. ): Alert  LOC Questions (1b. ):  Answers one correctly (off 4 days of birthday)  LOC Commands (1c. ): Obeys both correctly  Best Gaze (2. ): Normal  Visual (3. ): No visual loss  Facial Palsy (4. ): Normal  Motor Arm, Left (5a. ): No drift  Motor Arm, Right (5b. ): No drift  Motor Leg, Left (6a. ): No drift  Motor Leg, Right (6b. ): No drift  Limb Ataxia (7. ): Absent  Sensory (8. ): Normal  Best Language (9. ): No aphasia  Dysarthria (10. ): Normal  Extinction and Inattention (11): No neglect  Total: 1Glasgow Coma Scale  Eye Opening: Spontaneous  Best Verbal Response: Confused  Best Motor Response: Obeys commands  Paulina Coma Scale Score: 14        DIAGNOSTIC RESULTS   LABS:    Labs Reviewed   CBC WITH AUTO DIFFERENTIAL - Abnormal; Notable for the following:        Result Value    WBC 13.4 (*)     RBC 3.93 (*)     Hemoglobin 11.1 (*)     Hematocrit 34.3 (*)     RDW 17.7 (*)     Neutrophils # 12.0 (*)     Lymphocytes # 0.3 (*)     All other components within normal limits    Narrative:     Performed at:  [facility] [address], [facility], [address]   Phone [phone]   COMPREHENSIVE METABOLIC PANEL - Abnormal; Notable for the following:     Sodium 135 (*)     Chloride 95 (*)     Glucose 205 (*)     CREATININE 1.4 (*)     GFR Non- 37 (*)     GFR  45 (*)     Alb 3.0 age indeterminate infarct. This may be subacute to chronic. Chronic small vessel ischemic white matter disease and diffuse cerebral   volume loss. No evidence of intracranial hemorrhage. XR CHEST PORTABLE   Final Result   Enlarged cardiac silhouette with mild vascular congestion. No overt   pulmonary edema or definite pleural effusion. Xr Chest Standard (2 Vw)    Result Date: 11/3/2018  EXAMINATION: TWO VIEWS OF THE CHEST 11/3/2018 11:36 am COMPARISON: 10/01/2018 HISTORY: ORDERING SYSTEM PROVIDED HISTORY: Chest Discomfort TECHNOLOGIST PROVIDED HISTORY: Reason for exam:->Chest Discomfort Ordering Physician Provided Reason for Exam: FATIGUE AND SHORTNESS OF BREATH. PATIENT STATES SHE GOT HER FIRST IRON INFUSION DONE. NON SMOKER. HISTORY OF ASTHMA, A-FIB, HEMOPTYSIS, HTN, SKIN CANCER, DIABETES Acuity: Acute Type of Exam: Initial History of hypertension, hypercholesterolemia, essentially a diabetes, atrial fibrillation, blood clots, skin cancer FINDINGS: Radiopaque device projects over the anterior left chest.  Heart is enlarged stable. Again identified is a small left effusion which is similar in size to the prior study 10/01/2018. Central pulmonary vascular congestion is suspected. A few septal lines within the lung bases suggests interstitial edema     Stable cardiomegaly with small left pleural effusion pulmonary vascular congestion/mild edema.      Fl Barium Enema W Air Contrast    Result Date: 10/10/2018  EXAMINATION: SINGLE CONTRAST BARIUM ENEMA 10/10/2018 1:43 pm TECHNIQUE: Single contrast enema was performed with water-soluble contrast. FLUOROSCOPY DOSE AND TYPE OR TIME AND EXPOSURES: 2:18 minutes, 16 fluoroscopy images, 12 radiographs COMPARISON: 10/01/2018 HISTORY: ORDERING SYSTEM PROVIDED HISTORY: incomplete colonoscopy - evaluat the right colon TECHNOLOGIST PROVIDED HISTORY: Reason for exam:->incomplete colonoscopy - evaluat the right colon Ordering Physician Provided Reason

## 2018-11-06 NOTE — ED NOTES
Patient's  reported patient has had a change in her speech pattern the last couple of days, states she pauses mid sentence to figure out what item she is referring to or talking about. He states she can find the word she is looking for but there is a pause that she didn't normally have when speaking. Patient on warfarin for atrial fib, she was sent at [address] [place] [address] for an INR check yesterday.      Anthony Reeves RN  11/06/18 2464

## 2018-11-07 LAB
A/G RATIO: 0.7 (ref 1.1–2.2)
ALBUMIN SERPL-MCNC: 2.8 G/DL (ref 3.4–5)
ALP BLD-CCNC: 172 U/L (ref 40–129)
ALT SERPL-CCNC: 47 U/L (ref 10–40)
ANION GAP SERPL CALCULATED.3IONS-SCNC: 10 MMOL/L (ref 3–16)
AST SERPL-CCNC: 66 U/L (ref 15–37)
BASOPHILS ABSOLUTE: 0 K/UL (ref 0–0.2)
BASOPHILS RELATIVE PERCENT: 0.2 %
BILIRUB SERPL-MCNC: 0.7 MG/DL (ref 0–1)
BUN BLDV-MCNC: 22 MG/DL (ref 7–20)
CALCIUM SERPL-MCNC: 8.1 MG/DL (ref 8.3–10.6)
CHLORIDE BLD-SCNC: 102 MMOL/L (ref 99–110)
CO2: 26 MMOL/L (ref 21–32)
CREAT SERPL-MCNC: 1.5 MG/DL (ref 0.6–1.2)
EOSINOPHILS ABSOLUTE: 0.5 K/UL (ref 0–0.6)
EOSINOPHILS RELATIVE PERCENT: 7.3 %
ESTIMATED AVERAGE GLUCOSE: 142.7 MG/DL
GFR AFRICAN AMERICAN: 41
GFR NON-AFRICAN AMERICAN: 34
GLOBULIN: 4.1 G/DL
GLUCOSE BLD-MCNC: 125 MG/DL (ref 70–99)
GLUCOSE BLD-MCNC: 153 MG/DL (ref 70–99)
GLUCOSE BLD-MCNC: 177 MG/DL (ref 70–99)
GLUCOSE BLD-MCNC: 181 MG/DL (ref 70–99)
GLUCOSE BLD-MCNC: 70 MG/DL (ref 70–99)
GLUCOSE BLD-MCNC: 99 MG/DL (ref 70–99)
HBA1C MFR BLD: 6.6 %
HCT VFR BLD CALC: 28.8 % (ref 36–48)
HEMOGLOBIN: 9.4 G/DL (ref 12–16)
INR BLD: 1.96 (ref 0.86–1.14)
LACTIC ACID: 1.4 MMOL/L (ref 0.4–2)
LYMPHOCYTES ABSOLUTE: 0.4 K/UL (ref 1–5.1)
LYMPHOCYTES RELATIVE PERCENT: 6.5 %
MAGNESIUM: 2 MG/DL (ref 1.8–2.4)
MCH RBC QN AUTO: 28.6 PG (ref 26–34)
MCHC RBC AUTO-ENTMCNC: 32.7 G/DL (ref 31–36)
MCV RBC AUTO: 87.4 FL (ref 80–100)
MONOCYTES ABSOLUTE: 0.6 K/UL (ref 0–1.3)
MONOCYTES RELATIVE PERCENT: 8.5 %
NEUTROPHILS ABSOLUTE: 5.1 K/UL (ref 1.7–7.7)
NEUTROPHILS RELATIVE PERCENT: 77.5 %
PDW BLD-RTO: 17.6 % (ref 12.4–15.4)
PERFORMED ON: ABNORMAL
PERFORMED ON: NORMAL
PLATELET # BLD: 134 K/UL (ref 135–450)
PMV BLD AUTO: 8.2 FL (ref 5–10.5)
POTASSIUM SERPL-SCNC: 3.2 MMOL/L (ref 3.5–5.1)
POTASSIUM SERPL-SCNC: 3.5 MMOL/L (ref 3.5–5.1)
PROTHROMBIN TIME: 22.3 SEC (ref 9.8–13)
RBC # BLD: 3.3 M/UL (ref 4–5.2)
SODIUM BLD-SCNC: 138 MMOL/L (ref 136–145)
TOTAL PROTEIN: 6.9 G/DL (ref 6.4–8.2)
WBC # BLD: 6.6 K/UL (ref 4–11)

## 2018-11-07 PROCEDURE — 2060000000 HC ICU INTERMEDIATE R&B

## 2018-11-07 PROCEDURE — 6360000002 HC RX W HCPCS: Performed by: INTERNAL MEDICINE

## 2018-11-07 PROCEDURE — 2580000003 HC RX 258: Performed by: HOSPITALIST

## 2018-11-07 PROCEDURE — 6370000000 HC RX 637 (ALT 250 FOR IP): Performed by: HOSPITALIST

## 2018-11-07 PROCEDURE — 97530 THERAPEUTIC ACTIVITIES: CPT

## 2018-11-07 PROCEDURE — 36415 COLL VENOUS BLD VENIPUNCTURE: CPT

## 2018-11-07 PROCEDURE — G8980 MOBILITY D/C STATUS: HCPCS

## 2018-11-07 PROCEDURE — 85610 PROTHROMBIN TIME: CPT

## 2018-11-07 PROCEDURE — G8987 SELF CARE CURRENT STATUS: HCPCS

## 2018-11-07 PROCEDURE — G8979 MOBILITY GOAL STATUS: HCPCS

## 2018-11-07 PROCEDURE — 6360000002 HC RX W HCPCS: Performed by: HOSPITALIST

## 2018-11-07 PROCEDURE — 85025 COMPLETE CBC W/AUTO DIFF WBC: CPT

## 2018-11-07 PROCEDURE — 2580000003 HC RX 258: Performed by: INTERNAL MEDICINE

## 2018-11-07 PROCEDURE — 97161 PT EVAL LOW COMPLEX 20 MIN: CPT

## 2018-11-07 PROCEDURE — 80053 COMPREHEN METABOLIC PANEL: CPT

## 2018-11-07 PROCEDURE — G8978 MOBILITY CURRENT STATUS: HCPCS

## 2018-11-07 PROCEDURE — 96367 TX/PROPH/DG ADDL SEQ IV INF: CPT

## 2018-11-07 PROCEDURE — 84132 ASSAY OF SERUM POTASSIUM: CPT

## 2018-11-07 PROCEDURE — 83605 ASSAY OF LACTIC ACID: CPT

## 2018-11-07 PROCEDURE — G8988 SELF CARE GOAL STATUS: HCPCS

## 2018-11-07 PROCEDURE — 94760 N-INVAS EAR/PLS OXIMETRY 1: CPT

## 2018-11-07 PROCEDURE — 96366 THER/PROPH/DIAG IV INF ADDON: CPT

## 2018-11-07 PROCEDURE — 97165 OT EVAL LOW COMPLEX 30 MIN: CPT

## 2018-11-07 PROCEDURE — G8989 SELF CARE D/C STATUS: HCPCS

## 2018-11-07 PROCEDURE — 83735 ASSAY OF MAGNESIUM: CPT

## 2018-11-07 PROCEDURE — 97116 GAIT TRAINING THERAPY: CPT

## 2018-11-07 RX ORDER — POLYETHYLENE GLYCOL 3350 17 G/17G
17 POWDER, FOR SOLUTION ORAL DAILY PRN
Status: DISCONTINUED | OUTPATIENT
Start: 2018-11-07 | End: 2018-11-10 | Stop reason: HOSPADM

## 2018-11-07 RX ORDER — FAMOTIDINE 20 MG/1
20 TABLET, FILM COATED ORAL DAILY
Status: DISCONTINUED | OUTPATIENT
Start: 2018-11-08 | End: 2018-11-10 | Stop reason: HOSPADM

## 2018-11-07 RX ORDER — SODIUM CHLORIDE 9 MG/ML
INJECTION, SOLUTION INTRAVENOUS
Status: DISPENSED
Start: 2018-11-07 | End: 2018-11-07

## 2018-11-07 RX ADMIN — MONTELUKAST SODIUM 10 MG: 10 TABLET, FILM COATED ORAL at 21:44

## 2018-11-07 RX ADMIN — VANCOMYCIN HYDROCHLORIDE 1250 MG: 10 INJECTION, POWDER, LYOPHILIZED, FOR SOLUTION INTRAVENOUS at 17:31

## 2018-11-07 RX ADMIN — WARFARIN SODIUM 7.5 MG: 5 TABLET ORAL at 17:33

## 2018-11-07 RX ADMIN — DIGOXIN 125 MCG: 125 TABLET ORAL at 08:01

## 2018-11-07 RX ADMIN — CARVEDILOL 3.12 MG: 3.12 TABLET, FILM COATED ORAL at 08:01

## 2018-11-07 RX ADMIN — INSULIN GLARGINE 10 UNITS: 100 INJECTION, SOLUTION SUBCUTANEOUS at 21:44

## 2018-11-07 RX ADMIN — INSULIN LISPRO 1 UNITS: 100 INJECTION, SOLUTION INTRAVENOUS; SUBCUTANEOUS at 17:38

## 2018-11-07 RX ADMIN — POTASSIUM CHLORIDE 10 MEQ: 7.46 INJECTION, SOLUTION INTRAVENOUS at 06:24

## 2018-11-07 RX ADMIN — CEFEPIME HYDROCHLORIDE 2 G: 2 INJECTION, POWDER, FOR SOLUTION INTRAVENOUS at 19:22

## 2018-11-07 RX ADMIN — CARVEDILOL 3.12 MG: 3.12 TABLET, FILM COATED ORAL at 17:42

## 2018-11-07 RX ADMIN — FAMOTIDINE 20 MG: 20 TABLET, FILM COATED ORAL at 08:01

## 2018-11-07 RX ADMIN — CYANOCOBALAMIN TAB 1000 MCG 500 MCG: 1000 TAB at 08:07

## 2018-11-07 RX ADMIN — INSULIN LISPRO 1 UNITS: 100 INJECTION, SOLUTION INTRAVENOUS; SUBCUTANEOUS at 21:43

## 2018-11-07 RX ADMIN — POTASSIUM CHLORIDE 10 MEQ: 7.46 INJECTION, SOLUTION INTRAVENOUS at 17:18

## 2018-11-07 RX ADMIN — ATORVASTATIN CALCIUM 40 MG: 10 TABLET, FILM COATED ORAL at 08:01

## 2018-11-07 RX ADMIN — POTASSIUM CHLORIDE 10 MEQ: 7.46 INJECTION, SOLUTION INTRAVENOUS at 11:53

## 2018-11-07 RX ADMIN — Medication 10 ML: at 21:44

## 2018-11-07 RX ADMIN — CEFEPIME HYDROCHLORIDE 1 G: 1 INJECTION, POWDER, FOR SOLUTION INTRAMUSCULAR; INTRAVENOUS at 05:31

## 2018-11-07 RX ADMIN — POTASSIUM CHLORIDE 10 MEQ: 7.46 INJECTION, SOLUTION INTRAVENOUS at 14:58

## 2018-11-07 ASSESSMENT — PAIN SCALES - GENERAL
PAINLEVEL_OUTOF10: 0

## 2018-11-07 NOTE — PLAN OF CARE
Problem: COMMUNICATION IMPAIRMENT  Goal: Ability to express needs and understand communication  Outcome: Ongoing  Pt is able to appropriately express needs and understand the pictures and name objects in the NIH scale packet. Problem: Respiratory  Goal: O2 Sat > 90%  Outcome: Ongoing  Pt states she feels SOB sometimes. On 2L O2 for comfort. O2 sat 98%. Will continue to monitor. Problem: Falls - Risk of:  Goal: Will remain free from falls  Will remain free from falls   Outcome: Ongoing  Pt absent of fall this shift. Bed locked and in lowest position. Will continue to monitor.

## 2018-11-07 NOTE — PROGRESS NOTES
Pt and family state pt appears more edematous than usual. Hat in BR to monitor output. Will continue to monitor.

## 2018-11-07 NOTE — PROGRESS NOTES
Physical Therapy    Facility/Department: [address]  Initial Assessment/Discharge    NAME: Amado Douglass  : 1945  MRN: [id number]    Date of Service: 2018    Discharge Recommendations:Blanca August scored a 23/24 on the AM-PAC short mobility form. At this time, no further PT is recommended upon discharge due to pt being independent with functional mobility and at baseline level of function. Recommend cardiopulmonary rehab after follow up with physician. Pt would benefit from speech therapy evaluation due to confusion upon admission. PT Equipment Recommendations  Equipment Needed: No    Patient Diagnosis(es): The primary encounter diagnosis was Sepsis, due to unspecified organism [facility]). A diagnosis of Acute encephalopathy was also pertinent to this visit. has a past medical history of Asthma; Atrial fibrillation ([address] Eosinophilia; Hemoptysis; HIGH CHOLESTEROL; Hx of blood clots; Hypertension; Irregular heart beat; Other specified gastritis without mention of hemorrhage; Palpitations; Skin cancer; and Type II or unspecified type diabetes mellitus without mention of complication, not stated as uncontrolled. has a past surgical history that includes Cholecystectomy;  section; Colonoscopy (2017); skin biopsy; bronchoscopy (2016); Coronary artery bypass graft (2018); Mitral valve replacement (2018); transesophageal echocardiogram (2018); Tunneled venous catheter placement (Left, 2018); Cardiac catheterization (2018); Insertable Cardiac Monitor (Left, 2018); Colonoscopy (2014); Hysterectomy; Upper gastrointestinal endoscopy (N/A, 10/10/2018); and Colonoscopy (10/10/2018). Restrictions  Restrictions/Precautions  Restrictions/Precautions: Fall Risk (medium)  Required Braces or Orthoses?: No  Position Activity Restriction  Other position/activity restrictions:  This is a 13-year-old female brought to emergency department for evaluation of change in mental status. According to patient's family she been usual state of health until this last 48 hours. In fact more confused than usual.  No reported recent changes of diet or medications. No sick contacts. No head injury in the past 2 weeks. Do report the patient was acting in bizarre fashion today exacerbated for the past couple of hours. She was brought to emergency department for evaluation. Patient herself is stating that she does feel very nauseous an episode of vomiting denies any change in bowel or bladder habits. Patient denies fevers or cough. Vision/Hearing  Vision: Impaired (States that after heart surgery in August 2018, eyes have gotten a little worse)  Vision Exceptions: Wears glasses for reading  Hearing: Within functional limits       Subjective  General  Chart Reviewed: Yes  Family / Caregiver Present: Yes ( and daughter)  Diagnosis: acute encephalopathy  Follows Commands: Within Functional Limits  General Comment  Comments: Pt found lying supine in bed upon arrival.   Subjective  Subjective: Pt denies pain. Pt agreeable to PT/OT.    Pain Screening  Patient Currently in Pain: Denies  Vital Signs  Patient Currently in Pain: Denies     Orientation  Orientation  Overall Orientation Status: Within Functional Limits     Social/Functional History  Social/Functional History  Lives With: Spouse  Type of Home: House  Home Layout: One level, Laundry in basement, Able to Live on Main level with bedroom/bathroom  Home Access: Stairs to enter with rails  Entrance Stairs - Number of Steps: 3 HARLEY with B rails onto porch, 1 into living room  Entrance Stairs - Rails: Both  Bathroom Shower/Tub: Tub/Shower unit (has grab bars to put in and has shower chair with back that sits inside tub)  Bathroom Toilet: Handicap height  Bathroom Equipment: Toilet raiser (Pt has grab bars, but not installed; doesn't use RTS)  Bathroom Accessibility: Accessible  Home Equipment: 4 wheeled walker, [address] Help From: Family  ADL Assistance: Independent  Homemaking Assistance: Needs assistance ( takes care of cooking, cleaning, laundry since CABG in August. Pt does some cooking.)  Homemaking Responsibilities: No ( cooks and cleans right now)  Ambulation Assistance: Independent  Transfer Assistance: Independent  Active : Yes  Occupation: Retired  Leisure & Hobbies: reading, embroidery  Additional Comments: Pt reports no falls in past 6 months. Objective  Observation/Palpation  Posture: Good (forward shoulders)    AROM RLE (degrees)  RLE AROM: WFL  AROM LLE (degrees)  LLE AROM : WFL  Strength RLE  Strength RLE: Exception  Comment: hip flexion 3+/5; ankle and knee 4+/5  Strength LLE  Strength LLE: Exception  Comment: hip flexion 3+/5; ankle and knee 4+/5  Tone RLE  RLE Tone: Normotonic  Tone LLE  LLE Tone: Normotonic  Motor Control  Gross Motor?: WFL (heel to shin and toe tapping WFL)  Sensation  Overall Sensation Status: WFL     Bed mobility  Supine to Sit: Independent  Scooting: Independent     Transfers  Sit to Stand: Independent  Stand to sit: Independent     Ambulation  Ambulation?: Yes  Ambulation 1  Surface: level tile  Device: No Device  Assistance: Supervision  Quality of Gait: normal obie, symmetrical B step length  Distance: 250 ft  Comments: No LOB. Pt reports SOB, which is normal for her. Pt needed standing rest break for 25 seconds due to fatigue. As pt fatigued, reached out for hand rail in hallway.  SpO2 at rest break = 96%, SpO2 after ambulation = 96%  Stairs/Curb  Stairs?: No     Balance  Posture: Good  Sitting - Static: Good (Able to sit on EOB with no UE support and no postural sway)  Sitting - Dynamic: Good (Able to don/doff socks independently on EOB)  Standing - Static: Good (Able to stand independently with no postural sway or LOB)  Standing - Dynamic: Good (Ambulated with supervision and no LOB)      Assessment   Assessment: Pt is independent with

## 2018-11-07 NOTE — PROGRESS NOTES
Occupational Therapy   Occupational Therapy Initial Assessment/Discharge Summary  Date: 2018   Patient Name: Ariadna Lentz  MRN: [id number]     : 1945    Date of Service: 2018    Discharge Recommendations:  Ariadna Lentz scored a 24/24 on the AM-PAC ADL Inpatient form. At this time, no further OT is recommended upon discharge due to pt at baseline level of occupational function. Recommend patient returns to prior setting. Recommend speech therapy evaluation d/t pt with confusion PTA. OT Equipment Recommendations  Equipment Needed: No      Patient Diagnosis(es): The primary encounter diagnosis was Sepsis, due to unspecified organism ([facility] [address] A diagnosis of Acute encephalopathy was also pertinent to this visit. has a past medical history of Asthma; Atrial fibrillation ([facility] [address] Eosinophilia; Hemoptysis; HIGH CHOLESTEROL; Hx of blood clots; Hypertension; Irregular heart beat; Other specified gastritis without mention of hemorrhage; Palpitations; Skin cancer; and Type II or unspecified type diabetes mellitus without mention of complication, not stated as uncontrolled. has a past surgical history that includes Cholecystectomy;  section; Colonoscopy (2017); skin biopsy; bronchoscopy (2016); Coronary artery bypass graft (2018); Mitral valve replacement (2018); transesophageal echocardiogram (2018); Tunneled venous catheter placement (Left, 2018); Cardiac catheterization (2018); Insertable Cardiac Monitor (Left, 2018); Colonoscopy (2014); Hysterectomy; Upper gastrointestinal endoscopy (N/A, 10/10/2018); and Colonoscopy (10/10/2018). Restrictions  Restrictions/Precautions  Restrictions/Precautions: Fall Risk (medium)  Required Braces or Orthoses?: No  Position Activity Restriction  Other position/activity restrictions: This is a 71-year-old female brought to emergency department for evaluation of change in mental status. AROM : WFL  Left Hand AROM (degrees)  Left Hand AROM: WFL  RUE AROM (degrees)  RUE AROM : WFL  Right Hand AROM (degrees)  Right Hand AROM: WFL  LUE Strength  Gross LUE Strength: WNL (5/5 elbow, shoulder)  L Hand Grasp: 5/5  RUE Strength  Gross RUE Strength: WNL (5/5 elbow, shoulder)  R Hand Grasp: 5/5                  Assessment   Performance deficits / Impairments: Decreased endurance  Assessment: Pt is at her baseline level of occupational function with some decreased endurance, which has been her baseline since her heart surgery. She is independent with ADLs and functional mobility. Pt would benefit from a speech therapy evaluation d/t confusion prior to coming to hospital, per  report. Decision Making: Low Complexity  History: Pt lives w/ who is home all the time. 1 story home w/basement. Pt independent ADLs, gets assist IADLs, no falls. PMH: CABG 8/21/18, bronchoscopy, HTN, DM 2, A-fib, asthma  Exam: ROM, MMT, 6 clicks. 1 impairment, which is baseline since heart surgery, stable presentation  Assistance / Modification: None required  Patient Education: OT lon, d/c recommendation, energy saving strategies, e.g, use of shower chair, standing once to don underwear & pants, rest breaks  Barriers to Learning: None  REQUIRES OT FOLLOW UP: No  Activity Tolerance  Activity Tolerance: Patient Tolerated treatment well  Activity Tolerance: Did need brief standing rest break d/t SOB. , decreased to 106. SpO2 96%.   Safety Devices  Safety Devices in place: Yes  Type of devices: Gait belt;Left in chair;Nurse notified;Call light within reach (family present)         Plan   Plan  Plan Comment: D/C aucte OT    G-Code  OT G-codes  Functional Assessment Tool Used: 6 clicks  Score: 76/UB  Functional Limitation: Self care  Self Care Current Status (): 0 percent impaired, limited or restricted  Self Care Goal Status (): 0 percent impaired, limited or restricted  Self Care Discharge Status (): 0

## 2018-11-07 NOTE — H&P
MG tablet Take 1 tablet by mouth daily 90 tablet 1    digoxin (LANOXIN) 125 MCG tablet Take 1 tablet by mouth daily 90 tablet 1    warfarin (COUMADIN) 5 MG tablet Take 1 tablet by mouth daily Take 7.5 mg M W Fri, 5 mg on other days--inr with Dr Wellington Collins 30 tablet 3    vitamin B-12 500 MCG tablet Take 1 tablet by mouth daily 30 tablet 3    glimepiride (AMARYL) 4 MG tablet Take 0.5 tablets by mouth every morning (before breakfast) 90 tablet 3    carvedilol (COREG) 3.125 MG tablet Take 1 tablet by mouth 2 times daily (with meals) 180 tablet 1    montelukast (SINGULAIR) 10 MG tablet TAKE 1 TABLET NIGHTLY 90 tablet 3    polyethylene glycol (GLYCOLAX) powder FILL DOSING CUP TO MARKED LINE (17 GRAMS) THEN MIX IN LIQUID AND DRINK DAILY 1581 g 3    exenatide (BYETTA 10 MCG PEN) 10 MCG/0.04ML injection Inject 0.04 mLs into the skin 2 times daily (with meals) (Patient taking differently: Inject 10 mcg into the skin 2 times daily (with meals) ) 3 pen 3    nystatin (MYCOSTATIN) 992114 UNIT/ML suspension 1 teaspoon 4 times daily x 5 d 120 mL 5    albuterol sulfate HFA (PROAIR HFA) 108 (90 BASE) MCG/ACT inhaler Inhale 2 puffs into the lungs every 6 hours as needed for Wheezing 3 Inhaler 3    insulin glargine (LANTUS) 100 UNIT/ML injection pen Inject 10 Units into the skin 2 times daily 60 units AM and 40 units Bedtime 30 pen 3    Blood Glucose Monitoring Suppl (FREESTYLE LITE) GAETANO 1 Device by Does not apply route 4 times daily Patient to check blood sugar 4 times a day and PRN, she is treated with multiple daily injections of insulin that require correction dosing ;A1C 8.1 ; ICD code-E11.65 ,Z79.4 1 Device 0    blood glucose test strips (FREESTYLE LITE) strip 1 each by Does not apply route 4 times daily Patient to check blood sugar 4 times a day and PRN, she is treated with multiple daily injections of insulin that require correction dosing ;A1C 8.1 ; ICD code-E11.65 ,Z79.4 100 each 5    FREESTYLE LANCETS MISC 1 each by Does not apply route 4 times daily Patient to check blood sugar 4 times a day and PRN, she is treated with multiple daily injections of insulin that require correction dosing ;A1C 8.1 ; ICD code-E11.65 ,Z79.4 100 each 5    blood glucose test strips (ASCENSIA AUTODISC VI;ONE TOUCH ULTRA TEST VI) strip 1 each by Does not apply route 4 times daily (before meals and nightly) Patient to check blood sugar 4 times a day and PRN, she is treated with multiple daily injections of insulin that require correction dosing. LABA1C  8.1 08/16/2018 ICD code- E11.65, Z79.4 100 each 3         Allergies: Allergies   Allergen Reactions    Tvtuhrpl-Ktamuvd-Nqnpfh [Fluocinolone] Shortness Of Breath    Ciprofloxacin Shortness Of Breath    Diovan [Valsartan] Shortness Of Breath    Flagyl [Metronidazole] Shortness Of Breath     Has taken diflucan at home 12/7/15    Metformin And Related [Metformin And Related] Shortness Of Breath    Benazepril      Other reaction(s): Not Recorded    Morphine      Bad reaction. \"makes her feel horrible\".  Saxagliptin      Other reaction(s): Not Recorded    Levaquin [Levofloxacin] Rash          Social History:   reports that she has never smoked. She has never used smokeless tobacco. She reports that she does not drink alcohol or use drugs. Family History:        Family History   Problem Relation Age of Onset    Cancer Father     Asthma Mother     Hypertension Mother     Heart Disease Mother     High Blood Pressure Mother            Physical Exam:  /60   Pulse 82   Temp 98.5 °F (36.9 °C) (Oral)   Resp 24   Wt 201 lb 1.6 oz (91.2 kg)   SpO2 98%   BMI 30.58 kg/m²     General appearance: Appears  comfortable. Well nourished   Eyes:  Sclera clear, pupils equal  ENT: Moist mucus membranes, Trachea midline. Cardiovascular: Regular rhythm, normal S1, S2. Systolic murmur, gallop, rub. No edema in lower extremities   Respiratory:  Noted Dec AE B/ L .  No wheeze, good No evidence of intracranial hemorrhage. XR CHEST PORTABLE   Final Result   Enlarged cardiac silhouette with mild vascular congestion. No overt   pulmonary edema or definite pleural effusion. Echo    Summary   -Moderate concentric left ventricular hypertrophy.   -Low normal global ejection fraction estimated at 50%.  -Apical akinesis noted.   -Left atrial enlargement based on volume index.   -Mild aortic stenosis with a peak velocity of 2.18m/s and a mean pressure   gradient of 12 mmHg. The aortic valve area is estimated at 1.22 cm. No   significant regurgitation noted.   -The bioprosthetic mitral valve is well seated with peak velocity of 2.8 m/s   and a mean gradient of 12 mmHg.  No significant regurgitation noted.   -There is moderate tricuspid regurgitation with a RVSP estimation of 53   mmHg.   - Grade II diastolic dysfunction with elevated LV filling pressures.   -No obvious signs for thrombus utilizing optison imaging enhancing agent.       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] 10/01/2018    S/P mitral valve replacement with bioprosthetic valve [Z95.3] 08/22/2018    Status post aorto-coronary artery bypass graft [Z95.1] 08/22/2018    S/P Maze operation for atrial fibrillation [Z98.890, Z86.79] 08/22/2018    Non-rheumatic mitral regurgitation [I34.0]     Cardiac arrhythmia [I49.9]     Atrial fibrillation ([facility] [address] [I48.91]     Hypertension [I10]     Diabetes mellitus type 2 in obese ([facility] [address] [E11.69, E66.9] 04/27/2017    Primary osteoarthritis of both knees [M17.0] 03/21/2017    History of pulmonary embolism [Z86.711]     Paroxysmal SVT (supraventricular tachycardia) ([facility] [address] [I47.1] 01/06/2014    Essential hypertension [I10] 03/25/2013    Generalized osteoarthrosis, involving multiple sites [M15.9] 03/25/2013    Long term current use of anticoagulant [Z79.01] 12/19/2012    Mixed hyperlipidemia [E78.2] 12/19/2012         · SIRS , + fever + leukocytosis + lactic acidosis , ? Source unclear yet : IVF given in ED. Empiric Abx started . UA + Mild Pyuria . F/u Cx . Last UCX grew mixed growth   · Suspected Diastolic CHF , acute on chronic : BNP 5 K on admission . On PO Torsemide @ home . IVF given in ED X 1 as per sepsis protocol . Will hold on further IVF . Recent echo as above with EF 50 % . Cautious diuresis d/t marginal BP when BP is stable enough [ currently unable to start @ Admission , can re evaluate in AM ] . ? Cardiology c/s in AM   · Subacute CVA , posterior right occipital lobe : neurological checks while in house . ? MRI in AM [ has a loop recorder in situ, ?? Compatible unsure ]  . Neurology c/s in AM   · Acute encephalopathy [ Present on arrival/admission ]  , ? Multifactorial , ? Sepsis related vs CVA : CT as above . Neuro checks while inhouse . Neurology c/s . F/u Cx    · Meningioma , new incidental finding : neuro c/s in AM   · Supra clavicular and supra mediastinal LN tamara : needs f/u to resolution . ? Bx plan if does not resolve   · Chronic sinusitis   · Known CAD , s/p CABG in 8/2018 : Resumed home medications of BB + statins   · Known bioprosthetic MVR on coumadin @ home ? . Eliquis from last admission discharge was changed back to coumadin as OP by cardiologist   · Known h/o PE , on coumadin @ home . Pharmacy Mgt while Inhouse   · Known splenic infarcts   · AFib : Resumed home medications of BB + coumadin   · Chronic anemia   · CKD III : monitor while in house   · Essential Hypertension : Resumed home medications of BB   · Mixed Hyperlipidemia : statins   · IDDM : Inpatient diet as per orders. While inpatient, will closely monitor blood sugars and medicate with insulin per orders. Will adjust medications while IP for optimal control of symptoms/BS , as needed, depending on clinical progression   ·       · Home medications for Chronic medical problems reviewed.    · Home

## 2018-11-08 ENCOUNTER — TELEPHONE (OUTPATIENT)
Dept: FAMILY MEDICINE CLINIC | Age: 73
End: 2018-11-08

## 2018-11-08 LAB
A/G RATIO: 0.8 (ref 1.1–2.2)
ALBUMIN SERPL-MCNC: 3 G/DL (ref 3.4–5)
ALP BLD-CCNC: 214 U/L (ref 40–129)
ALT SERPL-CCNC: 52 U/L (ref 10–40)
ANION GAP SERPL CALCULATED.3IONS-SCNC: 11 MMOL/L (ref 3–16)
AST SERPL-CCNC: 52 U/L (ref 15–37)
BILIRUB SERPL-MCNC: 0.6 MG/DL (ref 0–1)
BUN BLDV-MCNC: 20 MG/DL (ref 7–20)
CALCIUM SERPL-MCNC: 8.1 MG/DL (ref 8.3–10.6)
CHLORIDE BLD-SCNC: 104 MMOL/L (ref 99–110)
CO2: 25 MMOL/L (ref 21–32)
CREAT SERPL-MCNC: 1.5 MG/DL (ref 0.6–1.2)
GFR AFRICAN AMERICAN: 41
GFR NON-AFRICAN AMERICAN: 34
GLOBULIN: 3.9 G/DL
GLUCOSE BLD-MCNC: 114 MG/DL (ref 70–99)
GLUCOSE BLD-MCNC: 117 MG/DL (ref 70–99)
GLUCOSE BLD-MCNC: 128 MG/DL (ref 70–99)
GLUCOSE BLD-MCNC: 234 MG/DL (ref 70–99)
GLUCOSE BLD-MCNC: 244 MG/DL (ref 70–99)
GLUCOSE BLD-MCNC: 265 MG/DL (ref 70–99)
HCT VFR BLD CALC: 28.3 % (ref 36–48)
HEMOGLOBIN: 9.2 G/DL (ref 12–16)
INR BLD: 2.17 (ref 0.86–1.14)
MAGNESIUM: 2.3 MG/DL (ref 1.8–2.4)
MCH RBC QN AUTO: 28.3 PG (ref 26–34)
MCHC RBC AUTO-ENTMCNC: 32.5 G/DL (ref 31–36)
MCV RBC AUTO: 87.2 FL (ref 80–100)
PDW BLD-RTO: 17.4 % (ref 12.4–15.4)
PERFORMED ON: ABNORMAL
PLATELET # BLD: 140 K/UL (ref 135–450)
PMV BLD AUTO: 8.4 FL (ref 5–10.5)
POTASSIUM REFLEX MAGNESIUM: 3.4 MMOL/L (ref 3.5–5.1)
PROTHROMBIN TIME: 24.7 SEC (ref 9.8–13)
RBC # BLD: 3.24 M/UL (ref 4–5.2)
SODIUM BLD-SCNC: 140 MMOL/L (ref 136–145)
TOTAL PROTEIN: 6.9 G/DL (ref 6.4–8.2)
URINE CULTURE, ROUTINE: NORMAL
WBC # BLD: 4.8 K/UL (ref 4–11)

## 2018-11-08 PROCEDURE — 6360000002 HC RX W HCPCS: Performed by: HOSPITALIST

## 2018-11-08 PROCEDURE — 2580000003 HC RX 258: Performed by: INTERNAL MEDICINE

## 2018-11-08 PROCEDURE — 80053 COMPREHEN METABOLIC PANEL: CPT

## 2018-11-08 PROCEDURE — 85610 PROTHROMBIN TIME: CPT

## 2018-11-08 PROCEDURE — 80074 ACUTE HEPATITIS PANEL: CPT

## 2018-11-08 PROCEDURE — 2580000003 HC RX 258

## 2018-11-08 PROCEDURE — 6360000002 HC RX W HCPCS: Performed by: INTERNAL MEDICINE

## 2018-11-08 PROCEDURE — 6370000000 HC RX 637 (ALT 250 FOR IP): Performed by: NURSE PRACTITIONER

## 2018-11-08 PROCEDURE — 2060000000 HC ICU INTERMEDIATE R&B

## 2018-11-08 PROCEDURE — 99223 1ST HOSP IP/OBS HIGH 75: CPT | Performed by: PSYCHIATRY & NEUROLOGY

## 2018-11-08 PROCEDURE — 85027 COMPLETE CBC AUTOMATED: CPT

## 2018-11-08 PROCEDURE — 6370000000 HC RX 637 (ALT 250 FOR IP): Performed by: HOSPITALIST

## 2018-11-08 PROCEDURE — 83735 ASSAY OF MAGNESIUM: CPT

## 2018-11-08 PROCEDURE — 2580000003 HC RX 258: Performed by: HOSPITALIST

## 2018-11-08 PROCEDURE — 6370000000 HC RX 637 (ALT 250 FOR IP): Performed by: INTERNAL MEDICINE

## 2018-11-08 PROCEDURE — 36415 COLL VENOUS BLD VENIPUNCTURE: CPT

## 2018-11-08 RX ORDER — SODIUM CHLORIDE 9 MG/ML
INJECTION, SOLUTION INTRAVENOUS
Status: COMPLETED
Start: 2018-11-08 | End: 2018-11-08

## 2018-11-08 RX ORDER — POTASSIUM CHLORIDE 20 MEQ/1
40 TABLET, EXTENDED RELEASE ORAL ONCE
Status: COMPLETED | OUTPATIENT
Start: 2018-11-08 | End: 2018-11-08

## 2018-11-08 RX ORDER — POTASSIUM CHLORIDE 20 MEQ/1
20 TABLET, EXTENDED RELEASE ORAL ONCE
Status: COMPLETED | OUTPATIENT
Start: 2018-11-08 | End: 2018-11-08

## 2018-11-08 RX ORDER — LORAZEPAM 2 MG/ML
1 INJECTION INTRAMUSCULAR ONCE
Status: COMPLETED | OUTPATIENT
Start: 2018-11-08 | End: 2018-11-09

## 2018-11-08 RX ADMIN — CYANOCOBALAMIN TAB 1000 MCG 500 MCG: 1000 TAB at 08:30

## 2018-11-08 RX ADMIN — POTASSIUM CHLORIDE 20 MEQ: 1500 TABLET, EXTENDED RELEASE ORAL at 06:19

## 2018-11-08 RX ADMIN — SODIUM CHLORIDE 250 ML: 9 INJECTION, SOLUTION INTRAVENOUS at 07:04

## 2018-11-08 RX ADMIN — POTASSIUM CHLORIDE 40 MEQ: 1500 TABLET, EXTENDED RELEASE ORAL at 14:31

## 2018-11-08 RX ADMIN — CARVEDILOL 3.12 MG: 3.12 TABLET, FILM COATED ORAL at 08:30

## 2018-11-08 RX ADMIN — POLYETHYLENE GLYCOL 3350 17 G: 17 POWDER, FOR SOLUTION ORAL at 06:06

## 2018-11-08 RX ADMIN — INSULIN LISPRO 2 UNITS: 100 INJECTION, SOLUTION INTRAVENOUS; SUBCUTANEOUS at 17:14

## 2018-11-08 RX ADMIN — DIGOXIN 125 MCG: 125 TABLET ORAL at 08:30

## 2018-11-08 RX ADMIN — FAMOTIDINE 20 MG: 20 TABLET, FILM COATED ORAL at 08:30

## 2018-11-08 RX ADMIN — INSULIN LISPRO 2 UNITS: 100 INJECTION, SOLUTION INTRAVENOUS; SUBCUTANEOUS at 21:42

## 2018-11-08 RX ADMIN — INSULIN GLARGINE 10 UNITS: 100 INJECTION, SOLUTION SUBCUTANEOUS at 21:43

## 2018-11-08 RX ADMIN — VANCOMYCIN HYDROCHLORIDE 1250 MG: 10 INJECTION, POWDER, LYOPHILIZED, FOR SOLUTION INTRAVENOUS at 17:18

## 2018-11-08 RX ADMIN — MONTELUKAST SODIUM 10 MG: 10 TABLET, FILM COATED ORAL at 21:54

## 2018-11-08 RX ADMIN — CEFEPIME HYDROCHLORIDE 2 G: 2 INJECTION, POWDER, FOR SOLUTION INTRAVENOUS at 07:04

## 2018-11-08 RX ADMIN — CARVEDILOL 3.12 MG: 3.12 TABLET, FILM COATED ORAL at 17:13

## 2018-11-08 RX ADMIN — ATORVASTATIN CALCIUM 40 MG: 10 TABLET, FILM COATED ORAL at 08:30

## 2018-11-08 RX ADMIN — CEFEPIME HYDROCHLORIDE 2 G: 2 INJECTION, POWDER, FOR SOLUTION INTRAVENOUS at 21:54

## 2018-11-08 RX ADMIN — WARFARIN SODIUM 5 MG: 5 TABLET ORAL at 17:13

## 2018-11-08 RX ADMIN — Medication 10 ML: at 08:31

## 2018-11-08 ASSESSMENT — PAIN SCALES - GENERAL: PAINLEVEL_OUTOF10: 0

## 2018-11-08 NOTE — CONSULTS
11.2 oz (91.5 kg)   SpO2 95%   BMI 30.67 kg/m²   Appearance: Well appearing, well nourished and in no distress  Mental Status Exam: Patient is alert, oriented to person, place and time. Recent and remote memory is normal  Fund of Knowledge is normal  Attention/concentration is normal.   Speech : No dysarthria  Language : No aphasia  Funduscopic Exam: sharp disc margins  Cranial Nerves:   II: Visual fields:  Full to confrontation  III: Pupils:  equal, round, reactive to light  III,IV,VI: Extra Ocular Movements are intact. No nystagmus  V: Facial sensation is intact to pin prick and light touch  VII: Facial strength and movements: intact and symmetric smile,cheek puffing and eyebrow elevation  VIII: Hearing:  Intact to finger rub bilaterally  IX: Palate  elevation is symmetric  XI: Shoulder shrug is intact  XII: Tongue movements are normal  Motor:  Muscle tone and bulk are normal.   Strength is symmetrical 5/5 in all four extremities. Sensory: Intact to light touch and  pin prick in all four extremities  Coordination:  Normal  Finger to Nose and Heel to Shin bilaterally    . Reflexes:  DTR 1 and symmetric bilaterally  Plantar response: Flexor bilaterally  Gait: Gait and station is normal.   Romberg: negative  Vascular: No carotid bruit bilaterally        DATA:  LABS:  General Labs:    CBC:   Lab Results   Component Value Date    WBC 4.8 11/08/2018    RBC 3.24 11/08/2018    HGB 9.2 11/08/2018    HCT 28.3 11/08/2018    MCV 87.2 11/08/2018    MCH 28.3 11/08/2018    MCHC 32.5 11/08/2018    RDW 17.4 11/08/2018     11/08/2018    MPV 8.4 11/08/2018     BMP:    Lab Results   Component Value Date     11/08/2018    K 3.4 11/08/2018     11/08/2018    CO2 25 11/08/2018    BUN 20 11/08/2018    LABALBU 3.0 11/08/2018    CREATININE 1.5 11/08/2018    CALCIUM 8.1 11/08/2018    GFRAA 41 11/08/2018    LABGLOM 34 11/08/2018    LABGLOM 36 10/01/2018    GLUCOSE 117 11/08/2018     RADIOLOGY REVIEW:  I have reviewed radiology image(s) and reports(s) of:  CT scan of the head    IMPRESSION :  Acute encephalopathy due to urinary tract infection, now improved   CT head shows a right occipital infarct. I suspect that this is either subacute or chronic. Most likely etiology would be atrial fibrillation causing cardiac emboli. Other risk factors include hypertension and hyperlipidemia. CT angiogram did not show any significant vascular disease. Patient Active Problem List   Diagnosis    Long term current use of anticoagulant    Mixed hyperlipidemia    Essential hypertension    Generalized osteoarthrosis, involving multiple sites    Eosinophilic gastritis    Paroxysmal SVT (supraventricular tachycardia) ([facility]    B12 deficiency    Supratherapeutic INR    History of pulmonary embolism    Multiple pulmonary nodules    Diabetes mellitus type 2 in obese (HCC)    Asthma    Atrial fibrillation (HCC)    Hypertension    Abnormal electrocardiogram    Palpitation    Cardiac arrhythmia    Unstable angina ([facility])    Weight loss counseling, encounter for    Coronary artery disease of native artery of native heart with stable angina pectoris ([address]    Non-rheumatic mitral regurgitation    S/P mitral valve replacement with bioprosthetic valve    Status post aorto-coronary artery bypass graft    S/P Maze operation for atrial fibrillation    Goiter    Primary osteoarthritis of both knees    Squamous cell carcinoma of lip    Splenic infarct    Abdominal pain, right upper quadrant    Acute colitis    Infection of superficial incisional surgical site after procedure    Obesity, Class I, BMI 30-34.9    Diabetes education, encounter for    Acute encephalopathy     RECOMMENDATIONS :  Discussed with patient and her   Continue Coumadin and maintain therapeutic ProTime and INR  Patient is willing to get MRI brain with some sedation. I will order an MRI brain. Thank you for this consultation.         Please note a

## 2018-11-08 NOTE — PROGRESS NOTES
4. Right frontal convexity partially calcified lesion most compatible with   meningioma. Findings were discussed with Nelida Gill at 2:25pm 11/6/2018. CTA HEAD W CONTRAST   Final Result   1. No high-grade stenosis or focal occlusion involving the intracranial   vasculature or cervical vasculature. No evidence of acute dissection. No   evidence of aneurysm. 2. Partially calcified extra-axial lesion along the right frontal convexity. This is most compatible with meningioma in absence of known history of   malignancy. 3. Mildly enlarged bilateral supraclavicular lymph nodes. Mildly enlarged   superior mediastinal lymph nodes. These are nonspecific, and may be   reactive. However, other etiologies, including neoplastic etiologies, are   not excluded. Consider follow-up to resolution. 4. Multiple curve linear foci of gas within the bilateral parotid glands are   favored to be secondary to recent IV placement. These are likely intravenous. 5. Partially calcified densities within right maxillary sinus, likely   representing chronic sinusitis. 6. Partially imaged small left pleural effusion. CTA NECK W CONTRAST   Final Result   1. No high-grade stenosis or focal occlusion involving the intracranial   vasculature or cervical vasculature. No evidence of acute dissection. No   evidence of aneurysm. 2. Partially calcified extra-axial lesion along the right frontal convexity. This is most compatible with meningioma in absence of known history of   malignancy. 3. Mildly enlarged bilateral supraclavicular lymph nodes. Mildly enlarged   superior mediastinal lymph nodes. These are nonspecific, and may be   reactive. However, other etiologies, including neoplastic etiologies, are   not excluded. Consider follow-up to resolution.       4. Multiple curve linear foci of gas within the bilateral parotid glands are   favored to be secondary to recent IV

## 2018-11-09 ENCOUNTER — APPOINTMENT (OUTPATIENT)
Dept: MRI IMAGING | Age: 73
DRG: 064 | End: 2018-11-09
Payer: MEDICARE

## 2018-11-09 LAB
GLUCOSE BLD-MCNC: 106 MG/DL (ref 70–99)
GLUCOSE BLD-MCNC: 175 MG/DL (ref 70–99)
GLUCOSE BLD-MCNC: 193 MG/DL (ref 70–99)
GLUCOSE BLD-MCNC: 211 MG/DL (ref 70–99)
GLUCOSE BLD-MCNC: 228 MG/DL (ref 70–99)
HAV IGM SER IA-ACNC: NORMAL
HEPATITIS B CORE IGM ANTIBODY: NORMAL
HEPATITIS B SURFACE ANTIGEN INTERPRETATION: NORMAL
HEPATITIS C ANTIBODY INTERPRETATION: NORMAL
INR BLD: 2.64 (ref 0.86–1.14)
PERFORMED ON: ABNORMAL
PROTHROMBIN TIME: 30.1 SEC (ref 9.8–13)

## 2018-11-09 PROCEDURE — 2060000000 HC ICU INTERMEDIATE R&B

## 2018-11-09 PROCEDURE — 36415 COLL VENOUS BLD VENIPUNCTURE: CPT

## 2018-11-09 PROCEDURE — 85610 PROTHROMBIN TIME: CPT

## 2018-11-09 PROCEDURE — 2580000003 HC RX 258: Performed by: INTERNAL MEDICINE

## 2018-11-09 PROCEDURE — 6370000000 HC RX 637 (ALT 250 FOR IP): Performed by: HOSPITALIST

## 2018-11-09 PROCEDURE — 6360000002 HC RX W HCPCS: Performed by: INTERNAL MEDICINE

## 2018-11-09 PROCEDURE — 2580000003 HC RX 258: Performed by: HOSPITALIST

## 2018-11-09 PROCEDURE — 99232 SBSQ HOSP IP/OBS MODERATE 35: CPT | Performed by: PSYCHIATRY & NEUROLOGY

## 2018-11-09 PROCEDURE — 70551 MRI BRAIN STEM W/O DYE: CPT

## 2018-11-09 PROCEDURE — 6370000000 HC RX 637 (ALT 250 FOR IP): Performed by: INTERNAL MEDICINE

## 2018-11-09 RX ORDER — WARFARIN SODIUM 5 MG/1
7.5 TABLET ORAL
Status: DISCONTINUED | OUTPATIENT
Start: 2018-11-12 | End: 2018-11-10 | Stop reason: HOSPADM

## 2018-11-09 RX ORDER — ROSUVASTATIN CALCIUM 10 MG/1
20 TABLET, COATED ORAL DAILY
Qty: 90 TABLET | Refills: 1 | Status: SHIPPED | OUTPATIENT
Start: 2018-11-09 | End: 2018-11-10

## 2018-11-09 RX ORDER — WARFARIN SODIUM 7.5 MG/1
3.75 TABLET ORAL
Status: COMPLETED | OUTPATIENT
Start: 2018-11-09 | End: 2018-11-09

## 2018-11-09 RX ORDER — ATORVASTATIN CALCIUM 80 MG/1
80 TABLET, FILM COATED ORAL DAILY
Status: DISCONTINUED | OUTPATIENT
Start: 2018-11-10 | End: 2018-11-10 | Stop reason: HOSPADM

## 2018-11-09 RX ADMIN — INSULIN LISPRO 1 UNITS: 100 INJECTION, SOLUTION INTRAVENOUS; SUBCUTANEOUS at 21:11

## 2018-11-09 RX ADMIN — CARVEDILOL 3.12 MG: 3.12 TABLET, FILM COATED ORAL at 17:35

## 2018-11-09 RX ADMIN — CARVEDILOL 3.12 MG: 3.12 TABLET, FILM COATED ORAL at 09:04

## 2018-11-09 RX ADMIN — ACETAMINOPHEN 650 MG: 325 TABLET, FILM COATED ORAL at 23:09

## 2018-11-09 RX ADMIN — ATORVASTATIN CALCIUM 40 MG: 10 TABLET, FILM COATED ORAL at 09:05

## 2018-11-09 RX ADMIN — FAMOTIDINE 20 MG: 20 TABLET, FILM COATED ORAL at 09:05

## 2018-11-09 RX ADMIN — Medication 10 ML: at 13:50

## 2018-11-09 RX ADMIN — Medication 10 ML: at 12:20

## 2018-11-09 RX ADMIN — INSULIN LISPRO 1 UNITS: 100 INJECTION, SOLUTION INTRAVENOUS; SUBCUTANEOUS at 12:20

## 2018-11-09 RX ADMIN — Medication 10 ML: at 09:05

## 2018-11-09 RX ADMIN — LORAZEPAM 1 MG: 2 INJECTION INTRAMUSCULAR; INTRAVENOUS at 13:50

## 2018-11-09 RX ADMIN — CYANOCOBALAMIN TAB 1000 MCG 500 MCG: 1000 TAB at 09:04

## 2018-11-09 RX ADMIN — MONTELUKAST SODIUM 10 MG: 10 TABLET, FILM COATED ORAL at 21:12

## 2018-11-09 RX ADMIN — CEFEPIME HYDROCHLORIDE 2 G: 2 INJECTION, POWDER, FOR SOLUTION INTRAVENOUS at 08:59

## 2018-11-09 RX ADMIN — WARFARIN SODIUM 3.75 MG: 7.5 TABLET ORAL at 17:35

## 2018-11-09 RX ADMIN — INSULIN LISPRO 1 UNITS: 100 INJECTION, SOLUTION INTRAVENOUS; SUBCUTANEOUS at 17:35

## 2018-11-09 RX ADMIN — Medication 10 ML: at 21:12

## 2018-11-09 RX ADMIN — INSULIN GLARGINE 10 UNITS: 100 INJECTION, SOLUTION SUBCUTANEOUS at 21:10

## 2018-11-09 RX ADMIN — DIGOXIN 125 MCG: 125 TABLET ORAL at 09:04

## 2018-11-09 ASSESSMENT — PAIN SCALES - GENERAL
PAINLEVEL_OUTOF10: 0
PAINLEVEL_OUTOF10: 6
PAINLEVEL_OUTOF10: 0
PAINLEVEL_OUTOF10: 0

## 2018-11-09 NOTE — PROGRESS NOTES
Social History Narrative    None        PHYSICAL EXAMINATION     /76   Pulse 76   Temp 97.4 °F (36.3 °C) (Temporal)   Resp 16   Ht 5' 8\" (1.727 m)   Wt 213 lb (96.6 kg)   SpO2 96%   BMI 32.39 kg/m²   This is a well-nourished patient in no acute distress  Patient is awake, alert and oriented x3. Speech is normal.  Pupils are equal round reacting to light. Extraocular movements intact. Face symmetrical. Tongue midline. Motor exam shows normal symmetrical strength. Deep tendon reflexes normal. Plantar reflexes downgoing. Sensory exam normal. Coordination normal. Gait normal. No carotid bruit. No neck stiffness. DATA :  LABS:  General Labs:    CBC:   Lab Results   Component Value Date    WBC 4.8 11/08/2018    RBC 3.24 11/08/2018    HGB 9.2 11/08/2018    HCT 28.3 11/08/2018    MCV 87.2 11/08/2018    MCH 28.3 11/08/2018    MCHC 32.5 11/08/2018    RDW 17.4 11/08/2018     11/08/2018    MPV 8.4 11/08/2018     BMP:    Lab Results   Component Value Date     11/08/2018    K 3.4 11/08/2018     11/08/2018    CO2 25 11/08/2018    BUN 20 11/08/2018    LABALBU 3.0 11/08/2018    CREATININE 1.5 11/08/2018    CALCIUM 8.1 11/08/2018    GFRAA 41 11/08/2018    LABGLOM 34 11/08/2018    LABGLOM 36 10/01/2018    GLUCOSE 117 11/08/2018     RADIOLOGY REVIEW:  I have reviewed radiology image(s) and reports(s) of:  CT scan of the head      IMPRESSION :  Acute encephalopathy due to urinary tract infection, now improved   CT head shows a right occipital infarct. I suspect that this is either subacute or chronic. Most likely etiology would be atrial fibrillation causing cardiac emboli. Other risk factors include hypertension and hyperlipidemia.   CT angiogram did not show any significant vascular disease  Patient Active Problem List   Diagnosis    Long term current use of anticoagulant    Mixed hyperlipidemia    Essential hypertension    Generalized osteoarthrosis, involving multiple sites   

## 2018-11-09 NOTE — PROGRESS NOTES
degraded exam.         CT Head WO Contrast   Final Result   1. Small area of hypodensity within right occipital lobe may represent age   indeterminate infarct. Recommend correlation with MRI. 2. Chronic small vessel ischemic white matter disease and diffuse cerebral   volume loss. 3. No evidence of intracranial hemorrhage. 4. Right frontal convexity partially calcified lesion most compatible with   meningioma. Findings were discussed with Yuli Koroma at 2:25pm 11/6/2018. CTA HEAD W CONTRAST   Final Result   1. No high-grade stenosis or focal occlusion involving the intracranial   vasculature or cervical vasculature. No evidence of acute dissection. No   evidence of aneurysm. 2. Partially calcified extra-axial lesion along the right frontal convexity. This is most compatible with meningioma in absence of known history of   malignancy. 3. Mildly enlarged bilateral supraclavicular lymph nodes. Mildly enlarged   superior mediastinal lymph nodes. These are nonspecific, and may be   reactive. However, other etiologies, including neoplastic etiologies, are   not excluded. Consider follow-up to resolution. 4. Multiple curve linear foci of gas within the bilateral parotid glands are   favored to be secondary to recent IV placement. These are likely intravenous. 5. Partially calcified densities within right maxillary sinus, likely   representing chronic sinusitis. 6. Partially imaged small left pleural effusion. CTA NECK W CONTRAST   Final Result   1. No high-grade stenosis or focal occlusion involving the intracranial   vasculature or cervical vasculature. No evidence of acute dissection. No   evidence of aneurysm. 2. Partially calcified extra-axial lesion along the right frontal convexity. This is most compatible with meningioma in absence of known history of   malignancy. 3. Mildly enlarged bilateral supraclavicular lymph nodes.   Mildly

## 2018-11-10 VITALS
TEMPERATURE: 97.2 F | BODY MASS INDEX: 32.05 KG/M2 | SYSTOLIC BLOOD PRESSURE: 109 MMHG | RESPIRATION RATE: 18 BRPM | WEIGHT: 211.5 LBS | HEART RATE: 80 BPM | DIASTOLIC BLOOD PRESSURE: 64 MMHG | HEIGHT: 68 IN | OXYGEN SATURATION: 94 %

## 2018-11-10 LAB
GLUCOSE BLD-MCNC: 117 MG/DL (ref 70–99)
GLUCOSE BLD-MCNC: 148 MG/DL (ref 70–99)
INR BLD: 2.85 (ref 0.86–1.14)
PERFORMED ON: ABNORMAL
PERFORMED ON: ABNORMAL
PROTHROMBIN TIME: 32.5 SEC (ref 9.8–13)

## 2018-11-10 PROCEDURE — 6370000000 HC RX 637 (ALT 250 FOR IP): Performed by: HOSPITALIST

## 2018-11-10 PROCEDURE — 85610 PROTHROMBIN TIME: CPT

## 2018-11-10 PROCEDURE — 6370000000 HC RX 637 (ALT 250 FOR IP): Performed by: INTERNAL MEDICINE

## 2018-11-10 PROCEDURE — 36415 COLL VENOUS BLD VENIPUNCTURE: CPT

## 2018-11-10 PROCEDURE — 2580000003 HC RX 258: Performed by: HOSPITALIST

## 2018-11-10 PROCEDURE — 99233 SBSQ HOSP IP/OBS HIGH 50: CPT | Performed by: PSYCHIATRY & NEUROLOGY

## 2018-11-10 RX ORDER — ROSUVASTATIN CALCIUM 10 MG/1
20 TABLET, COATED ORAL DAILY
Qty: 90 TABLET | Refills: 1 | Status: SHIPPED | OUTPATIENT
Start: 2018-11-10 | End: 2018-12-31 | Stop reason: SDUPTHER

## 2018-11-10 RX ADMIN — ATORVASTATIN CALCIUM 80 MG: 80 TABLET, FILM COATED ORAL at 09:30

## 2018-11-10 RX ADMIN — CARVEDILOL 3.12 MG: 3.12 TABLET, FILM COATED ORAL at 09:30

## 2018-11-10 RX ADMIN — FAMOTIDINE 20 MG: 20 TABLET, FILM COATED ORAL at 09:30

## 2018-11-10 RX ADMIN — DIGOXIN 125 MCG: 125 TABLET ORAL at 09:30

## 2018-11-10 RX ADMIN — CYANOCOBALAMIN TAB 1000 MCG 500 MCG: 1000 TAB at 09:30

## 2018-11-10 RX ADMIN — Medication 10 ML: at 09:30

## 2018-11-10 ASSESSMENT — PAIN SCALES - GENERAL
PAINLEVEL_OUTOF10: 0
PAINLEVEL_OUTOF10: 0

## 2018-11-10 NOTE — PLAN OF CARE
Problem: Falls - Risk of:  Goal: Will remain free from falls  Will remain free from falls    Outcome: Ongoing  Patient remains absent from falls at this time. Remains alert and oriented, in bed with call light and belongings in reach. Non-slip footwear on and 2/4 siderails raised. Bed remains in lowest/locked position at all times. Fall precautions in place. Patient encouraged to use call light to request assistance, v/u.  Will continue to monitor. Problem: HEMODYNAMIC STATUS  Goal: Patient has stable vital signs and fluid balance  Outcome: Ongoing  Patient VSS at this time on room air. NIHSS remains 0 - Neurology following. Will continue to monitor. Problem: Pain:  Goal: Pain level will decrease  Pain level will decrease   Outcome: Ongoing  Patient denies any pain at this time. Will continue to monitor.

## 2018-11-10 NOTE — PROGRESS NOTES
Problem List   Diagnosis    Long term current use of anticoagulant    Mixed hyperlipidemia    Essential hypertension    Generalized osteoarthrosis, involving multiple sites    Eosinophilic gastritis    Paroxysmal SVT (supraventricular tachycardia) (HCC)    B12 deficiency    Supratherapeutic INR    History of pulmonary embolism    Multiple pulmonary nodules    Diabetes mellitus type 2 in obese (HCC)    Asthma    Atrial fibrillation (HCC)    Hypertension    Abnormal electrocardiogram    Palpitation    Cardiac arrhythmia    Unstable angina (HCC)    Weight loss counseling, encounter for    Coronary artery disease of native artery of native heart with stable angina pectoris ([address]    Non-rheumatic mitral regurgitation    S/P mitral valve replacement with bioprosthetic valve    Status post aorto-coronary artery bypass graft    S/P Maze operation for atrial fibrillation    Goiter    Primary osteoarthritis of both knees    Squamous cell carcinoma of lip    Splenic infarct    Abdominal pain, right upper quadrant    Acute colitis    Infection of superficial incisional surgical site after procedure    Obesity, Class I, BMI 30-34.9    Diabetes education, encounter for    Acute encephalopathy       RECOMMENDATIONS :  Lengthy discussion with patient and her family. Explained about the MRI brain results. Recommended that she continue Coumadin as advised by her cardiologist but try to maintain the INR in the therapeutic range about 2. Okay to discharge from a neurological standpoint  High complexity due to acute stroke        Please note a portion of this chart was generated using dragon dictation software. Although every effort was made to ensure the accuracy of this automated transcription, some errors in transcription may have occurred.          Lourdes Minaya M.D.

## 2018-11-10 NOTE — PROGRESS NOTES
Data- discharge order received, pt verbalized agreement to discharge, disposition to previous residence, no needs for HHC/DME. Action- discharge instructions prepared and given to patient, pt verbalized understanding. Medication information packet given r/t NEW and/or CHANGED prescriptions emphasizing name/purpose/side effects, pt verbalized understanding. Discharge instruction summary: Diet- Carb Control, Activity- Resume as tolerated, Primary Care Physician as follows: Amanuel Noyola -[phone] f/u appointment 1-2 weeks, immunizations reviewed and up-to-date, prescription medications filled at pharmacy of choice. Inpatient surgical procedure precautions reviewed: N/A     Response- Pt belongings gathered, IV removed. Disposition is home (no HHC/DME needs), transported with belongings, taken to lobby via w/c w/ spouse, no complications.

## 2018-11-10 NOTE — PROGRESS NOTES
[facility] PROGRESS NOTE    11/10/2018 10:28 AM        Name: Dima Manzano .               Admitted: 11/6/2018  Primary Care Provider: Camelia Berumen MD (Tel: [phone])    Brief Course:  Admitted with confusion, initial concerns of infection, MRI positive for two acute strokes      CC: Confusion on admission     Subjective:  Pt slept poorly last night, otherwise no new issues       Reviewed interval ancillary notes    Current Medications    [START ON 11/12/2018] warfarin (COUMADIN) tablet 7.5 mg Once per day on Mon Tue Wed Fri   atorvastatin (LIPITOR) tablet 80 mg Daily   famotidine (PEPCID) tablet 20 mg Daily   polyethylene glycol (GLYCOLAX) packet 17 g Daily PRN   carvedilol (COREG) tablet 3.125 mg BID WC   digoxin (LANOXIN) tablet 125 mcg Daily   montelukast (SINGULAIR) tablet 10 mg Nightly   vitamin B-12 (CYANOCOBALAMIN) tablet 500 mcg Daily   sodium chloride flush 0.9 % injection 10 mL 2 times per day   sodium chloride flush 0.9 % injection 10 mL PRN   magnesium hydroxide (MILK OF MAGNESIA) 400 MG/5ML suspension 30 mL Daily PRN   ondansetron (ZOFRAN) injection 4 mg Q6H PRN   potassium chloride 10 mEq/100 mL IVPB (Peripheral Line) PRN   magnesium sulfate 1 g in dextrose 5% 100 mL IVPB PRN   acetaminophen (TYLENOL) tablet 650 mg Q4H PRN   glucose (GLUTOSE) 40 % oral gel 15 g PRN   dextrose 50 % solution 12.5 g PRN   glucagon (rDNA) injection 1 mg PRN   dextrose 5 % solution PRN   insulin glargine (LANTUS) injection pen 10 Units Nightly   insulin lispro (HUMALOG) injection pen 0-6 Units TID WC   insulin lispro (HUMALOG) injection pen 0-3 Units Nightly   warfarin (COUMADIN) tablet 5 mg Once per day on Sun Thu Sat       Objective:  Vitals: /64   Pulse 80   Temp 97.2 °F (36.2 °C) (Temporal)   Resp 18   Ht 5' 8\" (1.727 m)   Wt 211 lb 8 oz (95.9 kg)   SpO2 94%   BMI Peripheral   Henry: No  Diet: DIET CARB CONTROL;  Code:Full Code  DVT PPX Coumadin   Disposition  Home once cleared by Neurology       Sharene Barthel, MD   11/10/2018 10:28 AM

## 2018-11-11 ENCOUNTER — CARE COORDINATION (OUTPATIENT)
Dept: CASE MANAGEMENT | Age: 73
End: 2018-11-11

## 2018-11-11 LAB
BLOOD CULTURE, ROUTINE: NORMAL
CULTURE, BLOOD 2: NORMAL

## 2018-11-12 ENCOUNTER — CARE COORDINATION (OUTPATIENT)
Dept: CASE MANAGEMENT | Age: 73
End: 2018-11-12

## 2018-11-12 ENCOUNTER — OFFICE VISIT (OUTPATIENT)
Dept: FAMILY MEDICINE CLINIC | Age: 73
End: 2018-11-12
Payer: MEDICARE

## 2018-11-12 VITALS
OXYGEN SATURATION: 96 % | SYSTOLIC BLOOD PRESSURE: 130 MMHG | HEART RATE: 93 BPM | BODY MASS INDEX: 31.47 KG/M2 | WEIGHT: 207 LBS | DIASTOLIC BLOOD PRESSURE: 70 MMHG

## 2018-11-12 DIAGNOSIS — I50.43 ACUTE ON CHRONIC COMBINED SYSTOLIC AND DIASTOLIC CONGESTIVE HEART FAILURE (HCC): ICD-10-CM

## 2018-11-12 LAB
EKG ATRIAL RATE: 97 BPM
EKG DIAGNOSIS: NORMAL
EKG P AXIS: -22 DEGREES
EKG P-R INTERVAL: 224 MS
EKG Q-T INTERVAL: 338 MS
EKG QRS DURATION: 94 MS
EKG QTC CALCULATION (BAZETT): 429 MS
EKG R AXIS: 93 DEGREES
EKG T AXIS: 125 DEGREES
EKG VENTRICULAR RATE: 97 BPM

## 2018-11-12 PROCEDURE — 99214 OFFICE O/P EST MOD 30 MIN: CPT | Performed by: FAMILY MEDICINE

## 2018-11-12 RX ORDER — SPIRONOLACTONE 25 MG/1
25 TABLET ORAL DAILY
Qty: 30 TABLET | Refills: 0 | Status: SHIPPED | OUTPATIENT
Start: 2018-11-12 | End: 2018-12-14 | Stop reason: SDUPTHER

## 2018-11-12 RX ORDER — POTASSIUM CHLORIDE 750 MG/1
10 TABLET, EXTENDED RELEASE ORAL 2 TIMES DAILY
Qty: 60 TABLET | Refills: 0 | Status: SHIPPED | OUTPATIENT
Start: 2018-11-12 | End: 2018-12-14 | Stop reason: SDUPTHER

## 2018-11-12 RX ORDER — TORSEMIDE 20 MG/1
20 TABLET ORAL DAILY
Qty: 30 TABLET | Refills: 3 | Status: SHIPPED | OUTPATIENT
Start: 2018-11-12 | End: 2018-11-12 | Stop reason: SDUPTHER

## 2018-11-12 RX ORDER — TORSEMIDE 20 MG/1
20 TABLET ORAL DAILY
Qty: 30 TABLET | Refills: 3 | Status: SHIPPED | OUTPATIENT
Start: 2018-11-12 | End: 2019-03-01 | Stop reason: SDUPTHER

## 2018-11-12 RX ORDER — POTASSIUM CHLORIDE 750 MG/1
10 TABLET, EXTENDED RELEASE ORAL 2 TIMES DAILY
Qty: 60 TABLET | Refills: 0 | Status: SHIPPED | OUTPATIENT
Start: 2018-11-12 | End: 2018-11-12 | Stop reason: SDUPTHER

## 2018-11-12 RX ORDER — SPIRONOLACTONE 25 MG/1
25 TABLET ORAL DAILY
Qty: 30 TABLET | Refills: 0 | Status: SHIPPED | OUTPATIENT
Start: 2018-11-12 | End: 2018-11-12 | Stop reason: SDUPTHER

## 2018-11-12 NOTE — PROGRESS NOTES
Subjective:      Patient ID: Brian Sharma is a 71 y.o. female. HPI Patient is here for a Hospital follow up exam.     Patient was admitted to [facility] on 11/6/18 for stroke and altered mental status and released on 11/10/18. She has complaints of :  Shortness of breath   Weakness  Fatigue     Patient is here for follow-up of some shortness of breath. She was at the hospital on 3 November complaining of some shortness of breath improves prescribe some short-term torsemide at 20 mg a day. She was readmitted the hospital on 6 November with the sudden onset of altered mental status and was found to have brain stem infarcts. These were thought to be potentially embolic in character. She reports that when she was discharged from the hospital for her heart surgery her dry weight was 201 pounds. This morning at 2 AM she was very short of breath and weight herself at 216 pounds. She notes shortness of breath with minimal exertion. She notes some increased swelling in her ankles. Her BNP was elevated both on 11/ 3 and 11/6 when she was at the hospital.  She is felt weak since her heart surgery and also was felt more short of breath. She has not had any chest pain. Her activity is very limited. Mary Jo Divers following a low sodium diet but it stopped doing her daily weights shortly after her discharge from her heart surgery.   Current Outpatient Prescriptions on File Prior to Visit   Medication Sig Dispense Refill    rosuvastatin (CRESTOR) 10 MG tablet Take 2 tablets by mouth daily 90 tablet 1    torsemide (DEMADEX) 20 MG tablet Take 2 tablets by mouth daily for 10 days 20 tablet 0    digoxin (LANOXIN) 125 MCG tablet Take 1 tablet by mouth daily 90 tablet 1    warfarin (COUMADIN) 5 MG tablet Take 1 tablet by mouth daily Take 7.5 mg M W Fri, 5 mg on other days--inr with Dr Steve Ko 30 tablet 3    insulin glargine (LANTUS) 100 UNIT/ML injection pen Inject 10 Units into the skin 2 times daily 60 units AM and 40

## 2018-11-12 NOTE — CARE COORDINATION
[facility] Transitions Follow Up Call    2018    Patient: Nasim Garcia  Patient : 1945   MRN: [id number]  Reason for Admission: There are no discharge diagnoses documented for the most recent discharge. Discharge Date: 11/10/18 RARS: Readmission Risk Score: 35       Spoke with: pt's     Care Transitions Subsequent and Final Call    Subsequent and Final Calls  Do you have any ongoing symptoms?:  Yes  Onset of Patient-reported symptoms:  Other  Patient-reported symptoms:  Shortness of Breath, Weight Gain  Interventions for patient-reported symptoms:  Other  Have your medications changed?:  No  Do you have any questions related to your medications?:  No  Do you currently have any active services?:  No  Are you currently active with any services?:  Home Health  Do you have any needs or concerns that I can assist you with?:  No  Identified Barriers:  None  Care Transitions Interventions  No Identified Needs  Other Interventions:          Pt was SOB and is retaining fluid per  since d/c, currently at PCP office.  Agreed to more CTC f/u calls    Follow Up  Future Appointments  Date Time Provider Tawana Young   2018 8:00 AM SCHEDULE, SHERYL PACERS  [facility] [place]   2018 8:00 AM Ronni Lucero MD  [facility] [place]   2018 9:20 AM Jones Roberts MD [facility]   2018 11:00 AM SCHEDULE, SHERYL PHONE TRANSMISSION  [facility] [place]   2019 7:30 AM Ryan Monzon MD  [facility] [place]       Allen Dietz RN

## 2018-11-13 ENCOUNTER — PROCEDURE VISIT (OUTPATIENT)
Dept: CARDIOLOGY CLINIC | Age: 73
End: 2018-11-13
Payer: MEDICARE

## 2018-11-13 ENCOUNTER — OFFICE VISIT (OUTPATIENT)
Dept: CARDIOLOGY CLINIC | Age: 73
End: 2018-11-13
Payer: MEDICARE

## 2018-11-13 VITALS
HEIGHT: 68 IN | HEART RATE: 86 BPM | DIASTOLIC BLOOD PRESSURE: 72 MMHG | BODY MASS INDEX: 31.52 KG/M2 | SYSTOLIC BLOOD PRESSURE: 117 MMHG | WEIGHT: 208 LBS

## 2018-11-13 DIAGNOSIS — Z95.1 STATUS POST AORTO-CORONARY ARTERY BYPASS GRAFT: ICD-10-CM

## 2018-11-13 DIAGNOSIS — I48.0 PAF (PAROXYSMAL ATRIAL FIBRILLATION) (HCC): Primary | ICD-10-CM

## 2018-11-13 DIAGNOSIS — I47.1 PAROXYSMAL SVT (SUPRAVENTRICULAR TACHYCARDIA) (HCC): ICD-10-CM

## 2018-11-13 DIAGNOSIS — Z45.09 ENCOUNTER FOR ELECTRONIC ANALYSIS OF REVEAL EVENT RECORDER: ICD-10-CM

## 2018-11-13 DIAGNOSIS — I47.29 NSVT (NONSUSTAINED VENTRICULAR TACHYCARDIA): ICD-10-CM

## 2018-11-13 DIAGNOSIS — Z95.3 S/P MITRAL VALVE REPLACEMENT WITH BIOPROSTHETIC VALVE: ICD-10-CM

## 2018-11-13 DIAGNOSIS — I15.9 SECONDARY HYPERTENSION: ICD-10-CM

## 2018-11-13 DIAGNOSIS — E78.2 MIXED HYPERLIPIDEMIA: ICD-10-CM

## 2018-11-13 DIAGNOSIS — R00.2 PALPITATION: ICD-10-CM

## 2018-11-13 LAB
INR BLD: 2.7
PROTHROMBIN TIME: 26.6 SEC (ref 9–11.5)

## 2018-11-13 PROCEDURE — 93000 ELECTROCARDIOGRAM COMPLETE: CPT | Performed by: INTERNAL MEDICINE

## 2018-11-13 PROCEDURE — 99214 OFFICE O/P EST MOD 30 MIN: CPT | Performed by: INTERNAL MEDICINE

## 2018-11-13 PROCEDURE — 93291 INTERROG DEV EVAL SCRMS IP: CPT | Performed by: INTERNAL MEDICINE

## 2018-11-13 NOTE — PROGRESS NOTES
Patient comes in for interrogation of her implanted loop recorder. Interrogation shows no arrhythmias. Pt will see Dr. Tami Snell today.

## 2018-11-13 NOTE — PROGRESS NOTES
allowing me to participate in the care of Dima Manzano. Further evaluation will be based upon the patient's clinical course and testing results. All questions and concerns were addressed to the patient/family. Alternatives to my treatment were discussed. I have discussed the above stated plan and the patient verbalized understanding and agreed with the plan. NOTE: This report was transcribed using voice recognition software. Every effort was made to ensure accuracy, however, inadvertent computerized transcription errors may be present. Wanda Delong MD, MPH  [place] [id number]   Office: [phone]     Scribe attestation: This note was scribed in the presence of Wanda Delong MD by Nettie Arreola RN  Physician attestation: The scribe's documentation has been prepared under my direction and has been personally reviewed by me in its entirety. I confirm that the note above reflects all work, treatment, procedures, and medical decision making performed by me.

## 2018-11-14 ENCOUNTER — TELEPHONE (OUTPATIENT)
Dept: FAMILY MEDICINE CLINIC | Age: 73
End: 2018-11-14

## 2018-11-15 LAB
EKG ATRIAL RATE: 86 BPM
EKG DIAGNOSIS: NORMAL
EKG P AXIS: 74 DEGREES
EKG P-R INTERVAL: 208 MS
EKG Q-T INTERVAL: 344 MS
EKG QRS DURATION: 80 MS
EKG QTC CALCULATION (BAZETT): 411 MS
EKG R AXIS: 65 DEGREES
EKG T AXIS: 127 DEGREES
EKG VENTRICULAR RATE: 86 BPM

## 2018-11-16 ENCOUNTER — ANTI-COAG VISIT (OUTPATIENT)
Dept: FAMILY MEDICINE CLINIC | Age: 73
End: 2018-11-16

## 2018-11-16 ENCOUNTER — CARE COORDINATION (OUTPATIENT)
Dept: CASE MANAGEMENT | Age: 73
End: 2018-11-16

## 2018-11-19 ENCOUNTER — CARE COORDINATION (OUTPATIENT)
Dept: CASE MANAGEMENT | Age: 73
End: 2018-11-19

## 2018-11-19 NOTE — CARE COORDINATION
[facility] Transitions Follow Up Call    2018    Patient: Louie Kent  Patient : 1945   MRN: [id number]  Reason for Admission: There are no discharge diagnoses documented for the most recent discharge. Discharge Date: 11/10/18 RARS: Readmission Risk Score: 35       Spoke with: [facility] Transitions Subsequent and Final Call    Subsequent and Final Calls  Do you have any ongoing symptoms?:  No  Have your medications changed?:  No  Do you have any questions related to your medications?:  No  Do you currently have any active services?:  Yes  Are you currently active with any services?:  Home Health  Do you have any needs or concerns that I can assist you with?:  No  Identified Barriers:  None  Care Transitions Interventions  No Identified Needs  Other Interventions:          Pt states doing well, no issues or concerns. Pt stated over the last 3 days she has really feeling much better. Denies CP, SOB. Pt still has not received INR machine, this nurse had received contact information per the [facility] re  phone #, shared that with pt, she was very appreciative. F/u appts listed below. No need for further f/u CTC calls, will notify the [facility].         Follow Up  Future Appointments  Date Time Provider Tawana Young   2018 9:20 AM Chioma Vazquez MD [facility]   2018 11:00 AM Froilan Moreno PHONE TRANSMISSION  [facility]   2019 7:30 AM Chely Barahona MD  [facility]       Antoinette Bae RN

## 2018-11-20 ENCOUNTER — CARE COORDINATION (OUTPATIENT)
Dept: FAMILY MEDICINE CLINIC | Age: 73
End: 2018-11-20

## 2018-11-23 ENCOUNTER — CARE COORDINATION (OUTPATIENT)
Dept: FAMILY MEDICINE CLINIC | Age: 73
End: 2018-11-23

## 2018-11-23 NOTE — DISCHARGE SUMMARY
hyperintensity are nonspecific but commonly attributed to chronic microvascular ischemia. A 1.2 cm extra-axial lesion over the right frontal convexity is partially evaluated due to motion artifact and may represent a meningioma. ORBITS: The visualized portion of the orbits demonstrate no acute abnormality. SINUSES: The visualized paranasal sinuses and mastoid air cells are well aerated. BONES/SOFT TISSUES: The bone marrow signal intensity appears normal. The soft tissues demonstrate no acute abnormality. Possible punctate bilateral acute cerebral infarcts. Multiple chronic infarcts. Possible 1.2 cm right frontal meningioma which does not exert significant mass effect. Motion degraded exam.       Other Significant Diagnostic Studies: As described above    Treatments: As described above    Disposition: home    Discharge Medications:     Jhony Newsome   Home Medication Instructions NSN:[id number]    Printed on:11/23/18 6514   Medication Information                      albuterol sulfate HFA (PROAIR HFA) 108 (90 BASE) MCG/ACT inhaler  Inhale 2 puffs into the lungs every 6 hours as needed for Wheezing             Blood Glucose Monitoring Suppl (FREESTYLE LITE) GAETANO  1 Device by Does not apply route 4 times daily Patient to check blood sugar 4 times a day and PRN, she is treated with multiple daily injections of insulin that require correction dosing ;A1C 8.1 ; ICD code-E11.65 ,Z79.4             blood glucose test strips (ASCENSIA AUTODISC VI;ONE TOUCH ULTRA TEST VI) strip  1 each by Does not apply route 4 times daily (before meals and nightly) Patient to check blood sugar 4 times a day and PRN, she is treated with multiple daily injections of insulin that require correction dosing.  LABA1C  8.1 08/16/2018 ICD code- E11.65, Z79.4             blood glucose test strips (FREESTYLE LITE) strip  1 each by Does not apply route 4 times daily Patient to check blood sugar 4 times a day and PRN, she is treated with multiple daily injections of insulin that require correction dosing ;A1C 8.1 ; ICD code-E11.65 ,Z79.4             carvedilol (COREG) 3.125 MG tablet  Take 1 tablet by mouth 2 times daily (with meals)             digoxin (LANOXIN) 125 MCG tablet  Take 1 tablet by mouth daily             exenatide (BYETTA 10 MCG PEN) 10 MCG/0.04ML injection  Inject 0.04 mLs into the skin 2 times daily (with meals)             FREESTYLE LANCETS MISC  1 each by Does not apply route 4 times daily Patient to check blood sugar 4 times a day and PRN, she is treated with multiple daily injections of insulin that require correction dosing ;A1C 8.1 ; ICD code-E11.65 ,Z79.4             glimepiride (AMARYL) 4 MG tablet  Take 0.5 tablets by mouth every morning (before breakfast)             insulin glargine (LANTUS) 100 UNIT/ML injection pen  Inject 10 Units into the skin 2 times daily 60 units AM and 40 units Bedtime             montelukast (SINGULAIR) 10 MG tablet  TAKE 1 TABLET NIGHTLY             nystatin (MYCOSTATIN) 925178 UNIT/ML suspension  1 teaspoon 4 times daily x 5 d             polyethylene glycol (GLYCOLAX) powder  FILL DOSING CUP TO MARKED LINE (17 GRAMS) THEN MIX IN LIQUID AND DRINK DAILY             rosuvastatin (CRESTOR) 10 MG tablet  Take 2 tablets by mouth daily             vitamin B-12 500 MCG tablet  Take 1 tablet by mouth daily             warfarin (COUMADIN) 5 MG tablet  Take 1 tablet by mouth daily Take 7.5 mg M W Fri, 5 mg on other days--inr with Dr Ezequiel Gonzalez                 35 Minutes spent on patient evaluation, counseling and discharge planning.      Signed:  Kanwal Cortes MD  11/23/2018, 4:22 PM

## 2018-11-28 ENCOUNTER — OFFICE VISIT (OUTPATIENT)
Dept: FAMILY MEDICINE CLINIC | Age: 73
End: 2018-11-28
Payer: MEDICARE

## 2018-11-28 VITALS
SYSTOLIC BLOOD PRESSURE: 126 MMHG | BODY MASS INDEX: 31.7 KG/M2 | WEIGHT: 208.5 LBS | RESPIRATION RATE: 16 BRPM | DIASTOLIC BLOOD PRESSURE: 70 MMHG | OXYGEN SATURATION: 96 % | HEART RATE: 90 BPM

## 2018-11-28 DIAGNOSIS — E11.69 DIABETES MELLITUS TYPE 2 IN OBESE (HCC): ICD-10-CM

## 2018-11-28 DIAGNOSIS — I50.43 ACUTE ON CHRONIC COMBINED SYSTOLIC AND DIASTOLIC CONGESTIVE HEART FAILURE (HCC): Primary | ICD-10-CM

## 2018-11-28 DIAGNOSIS — I47.1 PAROXYSMAL SVT (SUPRAVENTRICULAR TACHYCARDIA) (HCC): ICD-10-CM

## 2018-11-28 DIAGNOSIS — E66.9 DIABETES MELLITUS TYPE 2 IN OBESE (HCC): ICD-10-CM

## 2018-11-28 LAB
B-TYPE NATRIURETIC PEPTIDE: 175 PG/ML
BUN / CREAT RATIO: 16 (CALC) (ref 6–22)
BUN BLDV-MCNC: 22 MG/DL (ref 7–25)
CALCIUM SERPL-MCNC: 9.4 MG/DL (ref 8.6–10.4)
CHLORIDE BLD-SCNC: 104 MMOL/L (ref 98–110)
CO2: 27 MMOL/L (ref 20–32)
CREAT SERPL-MCNC: 1.36 MG/DL (ref 0.6–0.93)
GFR AFRICAN AMERICAN: 45 ML/MIN/1.73M2
GFR SERPL CREATININE-BSD FRML MDRD: 39 ML/MIN/1.73M2
GLUCOSE BLD-MCNC: 228 MG/DL (ref 65–139)
INR BLD: 2.7
POTASSIUM SERPL-SCNC: 4 MMOL/L (ref 3.5–5.3)
PROTHROMBIN TIME: 26.6 SEC (ref 9–11.5)
SODIUM BLD-SCNC: 139 MMOL/L (ref 135–146)

## 2018-11-28 PROCEDURE — 99214 OFFICE O/P EST MOD 30 MIN: CPT | Performed by: FAMILY MEDICINE

## 2018-11-30 ENCOUNTER — ANTI-COAG VISIT (OUTPATIENT)
Dept: FAMILY MEDICINE CLINIC | Age: 73
End: 2018-11-30

## 2018-11-30 ENCOUNTER — TELEPHONE (OUTPATIENT)
Dept: FAMILY MEDICINE CLINIC | Age: 73
End: 2018-11-30

## 2018-11-30 LAB — INR BLD: 5.4

## 2018-12-03 ENCOUNTER — TELEPHONE (OUTPATIENT)
Dept: PHARMACY | Facility: CLINIC | Age: 73
End: 2018-12-03

## 2018-12-03 DIAGNOSIS — A41.9 SEPSIS, DUE TO UNSPECIFIED ORGANISM: Primary | ICD-10-CM

## 2018-12-03 PROCEDURE — 1111F DSCHRG MED/CURRENT MED MERGE: CPT

## 2018-12-03 NOTE — TELEPHONE ENCOUNTER
days--inr with Dr Elvi Morley (Patient taking differently: Take 5 mg by mouth daily Take 7.5 mg M Fri, 5 mg on other days--inr with Dr Elvi Morley) Taking as prescribed    Patient reports holding dose on Sat and Sun per PCP instructions. Will take 7.5 mg today and Friday with 5 mg on all other days. Patient tests INR at home    vitamin B-12 500 MCG tablet Take 1 tablet by mouth daily Taking as prescribed      glimepiride (AMARYL) 4 MG tablet Take 0.5 tablets by mouth every morning (before breakfast) Taking as prescribed      carvedilol (COREG) 3.125 MG tablet Take 1 tablet by mouth 2 times daily (with meals) Taking as prescribed      Blood Glucose Monitoring Suppl (FREESTYLE LITE) GAETANO 1 Device by Does not apply route 4 times daily Patient to check blood sugar 4 times a day and PRN, she is treated with multiple daily injections of insulin that require correction dosing ;A1C 8.1 ; ICD code-E11.65 ,Z79.4 (Patient taking differently: 1 Device by Does not apply route 2 times daily ) Updated per patient. States she tests BID. Has not had hypoglycemia. States her BG is lower in the morning. 12/3 AM level was 154. 12/2 AM level was 73. 12/1 AM level was 60.      blood glucose test strips (FREESTYLE LITE) strip 1 each by Does not apply route 4 times daily Patient to check blood sugar 4 times a day and PRN, she is treated with multiple daily injections of insulin that require correction dosing ;A1C 8.1 ; ICD code-E11.65 ,Z79.4 (Patient taking differently: 1 each by Does not apply route 2 times daily ) Updated per patient. States she tests BID. Has not had hypoglycemia. States her BG is lower in the morning. 12/3 AM level was 154. 12/2 AM level was 73. 12/1 AM level was 60.       FREESTYLE LANCETS MISC 1 each by Does not apply route 4 times daily Patient to check blood sugar 4 times a day and PRN, she is treated with multiple daily injections of insulin that require correction dosing ;A1C 8.1 ; ICD code-E11.65 ,Z79.4 via Randy as category D or higher.    - Renal Dosing: No renal adjustments necessary.    - Follow up appointment date (7 days for more severe illness, 14 days for others):    · Patient has followed up with PCP since discharge and will see cardiology in January    Thank you,    Simon Kenney, PharmD  [address]  Phone: [phone]    CLINICAL PHARMACY NOTE   POST-DISCHARGE [address] Only    TCM Call Made?: No  [facility]) [facility] Patient?: Yes  Total # of Interventions Recommended: 1 - Updated Order #: 1  Total # Interventions Accepted: 1  Intervention Severity:   - Level 1 Intervention Present?: No   - Level 2 #: 0   - Level 3 #: 0  Outreach Status: Review Complete  Care Coordinator Outreach to Patient?: Yes  Provider Contacted?: No  Waiting on response from: N/A  Time Spent (min): 30

## 2018-12-05 ENCOUNTER — TELEPHONE (OUTPATIENT)
Dept: FAMILY MEDICINE CLINIC | Age: 73
End: 2018-12-05

## 2018-12-05 NOTE — TELEPHONE ENCOUNTER
[employer] requested medication list and last A1C     The number they provided to be fax at is not a good number [phone]

## 2018-12-07 ENCOUNTER — TELEPHONE (OUTPATIENT)
Dept: FAMILY MEDICINE CLINIC | Age: 73
End: 2018-12-07

## 2018-12-07 ENCOUNTER — ANTI-COAG VISIT (OUTPATIENT)
Dept: FAMILY MEDICINE CLINIC | Age: 73
End: 2018-12-07

## 2018-12-07 LAB
BUN / CREAT RATIO: 18 (CALC) (ref 6–22)
BUN BLDV-MCNC: 22 MG/DL (ref 7–25)
CALCIUM SERPL-MCNC: 9.1 MG/DL (ref 8.6–10.4)
CHLORIDE BLD-SCNC: 104 MMOL/L (ref 98–110)
CO2: 27 MMOL/L (ref 20–32)
CREAT SERPL-MCNC: 1.2 MG/DL (ref 0.6–0.93)
DIGOXIN LEVEL: <0.5 MCG/L (ref 0.8–2)
GFR AFRICAN AMERICAN: 53 ML/MIN/1.73M2
GFR SERPL CREATININE-BSD FRML MDRD: 45 ML/MIN/1.73M2
GLUCOSE BLD-MCNC: 172 MG/DL (ref 65–139)
INR BLD: 2.6
POTASSIUM SERPL-SCNC: 3.9 MMOL/L (ref 3.5–5.3)
SODIUM BLD-SCNC: 139 MMOL/L (ref 135–146)

## 2018-12-11 ENCOUNTER — HOSPITAL ENCOUNTER (OUTPATIENT)
Dept: CARDIAC REHAB | Age: 73
Setting detail: THERAPIES SERIES
Discharge: HOME | End: 2018-12-11
Payer: MEDICARE

## 2018-12-14 ENCOUNTER — ANTI-COAG VISIT (OUTPATIENT)
Dept: FAMILY MEDICINE CLINIC | Age: 73
End: 2018-12-14

## 2018-12-14 ENCOUNTER — HOSPITAL ENCOUNTER (OUTPATIENT)
Dept: CARDIAC REHAB | Age: 73
Setting detail: THERAPIES SERIES
Discharge: HOME | End: 2018-12-14
Payer: MEDICARE

## 2018-12-14 LAB — INR BLD: 3.3

## 2018-12-14 PROCEDURE — 93798 PHYS/QHP OP CAR RHAB W/ECG: CPT

## 2018-12-14 RX ORDER — SPIRONOLACTONE 25 MG/1
25 TABLET ORAL DAILY
Qty: 90 TABLET | Refills: 1 | Status: SHIPPED | OUTPATIENT
Start: 2018-12-14 | End: 2019-05-10 | Stop reason: SDUPTHER

## 2018-12-14 RX ORDER — POTASSIUM CHLORIDE 750 MG/1
10 TABLET, EXTENDED RELEASE ORAL DAILY
Qty: 90 TABLET | Refills: 1 | Status: SHIPPED | OUTPATIENT
Start: 2018-12-14 | End: 2019-05-10 | Stop reason: SDUPTHER

## 2018-12-17 ENCOUNTER — HOSPITAL ENCOUNTER (OUTPATIENT)
Dept: CARDIAC REHAB | Age: 73
Setting detail: THERAPIES SERIES
Discharge: HOME | End: 2018-12-17
Payer: MEDICARE

## 2018-12-17 PROCEDURE — 93299 PR REM INTERROG ICPMS/SCRMS <30 D TECH REVIEW: CPT | Performed by: INTERNAL MEDICINE

## 2018-12-17 PROCEDURE — 93298 REM INTERROG DEV EVAL SCRMS: CPT | Performed by: INTERNAL MEDICINE

## 2018-12-17 PROCEDURE — 93798 PHYS/QHP OP CAR RHAB W/ECG: CPT

## 2018-12-18 ENCOUNTER — NURSE ONLY (OUTPATIENT)
Dept: CARDIOLOGY CLINIC | Age: 73
End: 2018-12-18
Payer: MEDICARE

## 2018-12-18 DIAGNOSIS — R00.2 PALPITATION: ICD-10-CM

## 2018-12-18 DIAGNOSIS — Z45.09 ENCOUNTER FOR ELECTRONIC ANALYSIS OF REVEAL EVENT RECORDER: ICD-10-CM

## 2018-12-18 PROCEDURE — 90685 IIV4 VACC NO PRSV 0.25 ML IM: CPT | Performed by: FAMILY MEDICINE

## 2018-12-18 PROCEDURE — G0008 ADMIN INFLUENZA VIRUS VAC: HCPCS | Performed by: FAMILY MEDICINE

## 2018-12-19 ENCOUNTER — HOSPITAL ENCOUNTER (OUTPATIENT)
Dept: CARDIAC REHAB | Age: 73
Setting detail: THERAPIES SERIES
End: 2018-12-19
Payer: MEDICARE

## 2018-12-19 ENCOUNTER — HOSPITAL ENCOUNTER (OUTPATIENT)
Dept: CARDIAC REHAB | Age: 73
Setting detail: THERAPIES SERIES
Discharge: HOME | End: 2018-12-19
Payer: MEDICARE

## 2018-12-19 PROCEDURE — 93798 PHYS/QHP OP CAR RHAB W/ECG: CPT

## 2018-12-21 ENCOUNTER — HOSPITAL ENCOUNTER (OUTPATIENT)
Dept: CARDIAC REHAB | Age: 73
Setting detail: THERAPIES SERIES
Discharge: HOME | End: 2018-12-21
Payer: MEDICARE

## 2018-12-21 ENCOUNTER — ANTI-COAG VISIT (OUTPATIENT)
Dept: FAMILY MEDICINE CLINIC | Age: 73
End: 2018-12-21

## 2018-12-21 LAB — INR BLD: 3.8

## 2018-12-21 PROCEDURE — 93798 PHYS/QHP OP CAR RHAB W/ECG: CPT

## 2018-12-25 LAB — INR BLD: 2.6

## 2018-12-26 ENCOUNTER — HOSPITAL ENCOUNTER (OUTPATIENT)
Dept: CARDIAC REHAB | Age: 73
Setting detail: THERAPIES SERIES
End: 2018-12-26
Payer: MEDICARE

## 2018-12-28 ENCOUNTER — CARE COORDINATION (OUTPATIENT)
Dept: CARE COORDINATION | Age: 73
End: 2018-12-28

## 2018-12-28 ENCOUNTER — ANTI-COAG VISIT (OUTPATIENT)
Dept: FAMILY MEDICINE CLINIC | Age: 73
End: 2018-12-28

## 2018-12-28 ENCOUNTER — HOSPITAL ENCOUNTER (OUTPATIENT)
Dept: CARDIAC REHAB | Age: 73
Setting detail: THERAPIES SERIES
End: 2018-12-28
Payer: MEDICARE

## 2018-12-28 ENCOUNTER — OFFICE VISIT (OUTPATIENT)
Dept: FAMILY MEDICINE CLINIC | Age: 73
End: 2018-12-28
Payer: MEDICARE

## 2018-12-28 VITALS
OXYGEN SATURATION: 96 % | WEIGHT: 211.2 LBS | BODY MASS INDEX: 32.11 KG/M2 | HEART RATE: 90 BPM | DIASTOLIC BLOOD PRESSURE: 70 MMHG | SYSTOLIC BLOOD PRESSURE: 130 MMHG

## 2018-12-28 DIAGNOSIS — K52.9 ACUTE GASTROENTERITIS: Primary | ICD-10-CM

## 2018-12-28 DIAGNOSIS — E11.69 DIABETES MELLITUS TYPE 2 IN OBESE (HCC): ICD-10-CM

## 2018-12-28 DIAGNOSIS — M15.9 GENERALIZED OSTEOARTHROSIS, INVOLVING MULTIPLE SITES: ICD-10-CM

## 2018-12-28 DIAGNOSIS — E66.9 DIABETES MELLITUS TYPE 2 IN OBESE (HCC): ICD-10-CM

## 2018-12-28 PROCEDURE — 99214 OFFICE O/P EST MOD 30 MIN: CPT | Performed by: FAMILY MEDICINE

## 2018-12-28 RX ORDER — OXYCODONE HYDROCHLORIDE 5 MG/1
5 TABLET ORAL EVERY 6 HOURS PRN
Qty: 100 TABLET | Refills: 0 | Status: SHIPPED | OUTPATIENT
Start: 2018-12-28 | End: 2019-01-27

## 2018-12-28 RX ORDER — ONDANSETRON 4 MG/1
4 TABLET, FILM COATED ORAL 3 TIMES DAILY PRN
Qty: 30 TABLET | Refills: 0 | Status: SHIPPED | OUTPATIENT
Start: 2018-12-28 | End: 2020-03-26 | Stop reason: SDUPTHER

## 2018-12-31 ENCOUNTER — TELEPHONE (OUTPATIENT)
Dept: FAMILY MEDICINE CLINIC | Age: 73
End: 2018-12-31

## 2018-12-31 RX ORDER — ROSUVASTATIN CALCIUM 10 MG/1
20 TABLET, COATED ORAL DAILY
Qty: 90 TABLET | Refills: 1 | Status: SHIPPED | OUTPATIENT
Start: 2018-12-31 | End: 2019-01-02 | Stop reason: SDUPTHER

## 2019-01-02 ENCOUNTER — ANTI-COAG VISIT (OUTPATIENT)
Dept: FAMILY MEDICINE CLINIC | Age: 74
End: 2019-01-02

## 2019-01-02 ENCOUNTER — HOSPITAL ENCOUNTER (OUTPATIENT)
Dept: CARDIAC REHAB | Age: 74
Setting detail: THERAPIES SERIES
Discharge: HOME | End: 2019-01-02
Payer: MEDICARE

## 2019-01-02 ENCOUNTER — TELEPHONE (OUTPATIENT)
Dept: FAMILY MEDICINE CLINIC | Age: 74
End: 2019-01-02

## 2019-01-02 LAB — INR BLD: 1.8

## 2019-01-02 PROCEDURE — 93798 PHYS/QHP OP CAR RHAB W/ECG: CPT

## 2019-01-02 RX ORDER — ROSUVASTATIN CALCIUM 20 MG/1
20 TABLET, COATED ORAL DAILY
Qty: 90 TABLET | Refills: 1 | Status: SHIPPED | OUTPATIENT
Start: 2019-01-02 | End: 2019-04-03 | Stop reason: SDUPTHER

## 2019-01-04 ENCOUNTER — APPOINTMENT (OUTPATIENT)
Dept: CARDIAC REHAB | Age: 74
End: 2019-01-04
Payer: MEDICARE

## 2019-01-07 ENCOUNTER — HOSPITAL ENCOUNTER (OUTPATIENT)
Dept: CARDIAC REHAB | Age: 74
Setting detail: THERAPIES SERIES
Discharge: HOME | End: 2019-01-07
Payer: MEDICARE

## 2019-01-07 PROCEDURE — 93798 PHYS/QHP OP CAR RHAB W/ECG: CPT

## 2019-01-09 ENCOUNTER — HOSPITAL ENCOUNTER (OUTPATIENT)
Dept: CARDIAC REHAB | Age: 74
Setting detail: THERAPIES SERIES
Discharge: HOME | End: 2019-01-09
Payer: MEDICARE

## 2019-01-09 PROCEDURE — 93798 PHYS/QHP OP CAR RHAB W/ECG: CPT

## 2019-01-10 ENCOUNTER — ANTI-COAG VISIT (OUTPATIENT)
Dept: FAMILY MEDICINE CLINIC | Age: 74
End: 2019-01-10

## 2019-01-10 LAB — INR BLD: 2.1

## 2019-01-11 ENCOUNTER — APPOINTMENT (OUTPATIENT)
Dept: CARDIAC REHAB | Age: 74
End: 2019-01-11
Payer: MEDICARE

## 2019-01-11 ENCOUNTER — OFFICE VISIT (OUTPATIENT)
Dept: FAMILY MEDICINE CLINIC | Age: 74
End: 2019-01-11
Payer: MEDICARE

## 2019-01-11 VITALS
BODY MASS INDEX: 31.96 KG/M2 | WEIGHT: 210.2 LBS | HEART RATE: 95 BPM | OXYGEN SATURATION: 95 % | DIASTOLIC BLOOD PRESSURE: 80 MMHG | TEMPERATURE: 98.4 F | SYSTOLIC BLOOD PRESSURE: 120 MMHG

## 2019-01-11 DIAGNOSIS — R07.1 CHEST PAIN ON BREATHING: Primary | ICD-10-CM

## 2019-01-11 DIAGNOSIS — E11.69 DIABETES MELLITUS TYPE 2 IN OBESE (HCC): ICD-10-CM

## 2019-01-11 DIAGNOSIS — E66.9 DIABETES MELLITUS TYPE 2 IN OBESE (HCC): ICD-10-CM

## 2019-01-11 DIAGNOSIS — K29.00 ACUTE GASTRITIS WITHOUT HEMORRHAGE, UNSPECIFIED GASTRITIS TYPE: ICD-10-CM

## 2019-01-11 PROCEDURE — 99214 OFFICE O/P EST MOD 30 MIN: CPT | Performed by: FAMILY MEDICINE

## 2019-01-11 PROCEDURE — 96372 THER/PROPH/DIAG INJ SC/IM: CPT | Performed by: FAMILY MEDICINE

## 2019-01-11 RX ORDER — TRIAMCINOLONE ACETONIDE 40 MG/ML
40 INJECTION, SUSPENSION INTRA-ARTICULAR; INTRAMUSCULAR ONCE
Status: COMPLETED | OUTPATIENT
Start: 2019-01-11 | End: 2019-01-11

## 2019-01-11 RX ORDER — OMEPRAZOLE 40 MG/1
40 CAPSULE, DELAYED RELEASE ORAL
Qty: 30 CAPSULE | Refills: 5 | Status: SHIPPED
Start: 2019-01-11 | End: 2020-05-08 | Stop reason: DRUGHIGH

## 2019-01-11 RX ADMIN — TRIAMCINOLONE ACETONIDE 40 MG: 40 INJECTION, SUSPENSION INTRA-ARTICULAR; INTRAMUSCULAR at 17:15

## 2019-01-14 ENCOUNTER — TELEPHONE (OUTPATIENT)
Dept: FAMILY MEDICINE CLINIC | Age: 74
End: 2019-01-14

## 2019-01-14 ENCOUNTER — APPOINTMENT (OUTPATIENT)
Dept: CT IMAGING | Age: 74
DRG: 395 | End: 2019-01-14
Payer: MEDICARE

## 2019-01-14 ENCOUNTER — HOSPITAL ENCOUNTER (INPATIENT)
Age: 74
LOS: 7 days | Discharge: HOME | DRG: 395 | End: 2019-01-21
Attending: EMERGENCY MEDICINE | Admitting: INTERNAL MEDICINE
Payer: MEDICARE

## 2019-01-14 ENCOUNTER — HOSPITAL ENCOUNTER (OUTPATIENT)
Age: 74
Discharge: HOME | DRG: 395 | End: 2019-01-14
Payer: MEDICARE

## 2019-01-14 ENCOUNTER — HOSPITAL ENCOUNTER (OUTPATIENT)
Dept: GENERAL RADIOLOGY | Age: 74
Discharge: HOME | DRG: 395 | End: 2019-01-14
Payer: MEDICARE

## 2019-01-14 ENCOUNTER — HOSPITAL ENCOUNTER (OUTPATIENT)
Dept: CARDIAC REHAB | Age: 74
Setting detail: THERAPIES SERIES
Discharge: HOME | End: 2019-01-14
Payer: MEDICARE

## 2019-01-14 DIAGNOSIS — R07.9 RIGHT-SIDED CHEST PAIN: ICD-10-CM

## 2019-01-14 DIAGNOSIS — M54.6 ACUTE RIGHT-SIDED THORACIC BACK PAIN: ICD-10-CM

## 2019-01-14 DIAGNOSIS — R07.9 RIGHT-SIDED CHEST PAIN: Primary | ICD-10-CM

## 2019-01-14 DIAGNOSIS — K63.89 PNEUMATOSIS INTESTINALIS: ICD-10-CM

## 2019-01-14 DIAGNOSIS — K66.8 INTRA-ABDOMINAL FREE AIR OF UNKNOWN ETIOLOGY: Primary | ICD-10-CM

## 2019-01-14 LAB
A/G RATIO: 1 (ref 1.1–2.2)
ABO/RH: NORMAL
ALBUMIN SERPL-MCNC: 3.8 G/DL (ref 3.4–5)
ALP BLD-CCNC: 103 U/L (ref 40–129)
ALT SERPL-CCNC: 8 U/L (ref 10–40)
ANION GAP SERPL CALCULATED.3IONS-SCNC: 12 MMOL/L (ref 3–16)
ANISOCYTOSIS: ABNORMAL
ANTIBODY SCREEN: NORMAL
AST SERPL-CCNC: 12 U/L (ref 15–37)
BANDED NEUTROPHILS RELATIVE PERCENT: 1 % (ref 0–7)
BASOPHILS ABSOLUTE: 0 K/UL (ref 0–0.2)
BASOPHILS RELATIVE PERCENT: 0 %
BILIRUB SERPL-MCNC: 0.7 MG/DL (ref 0–1)
BUN BLDV-MCNC: 20 MG/DL (ref 7–20)
CALCIUM SERPL-MCNC: 9.3 MG/DL (ref 8.3–10.6)
CHLORIDE BLD-SCNC: 101 MMOL/L (ref 99–110)
CO2: 25 MMOL/L (ref 21–32)
CREAT SERPL-MCNC: 1.4 MG/DL (ref 0.6–1.2)
EOSINOPHILS ABSOLUTE: 2.5 K/UL (ref 0–0.6)
EOSINOPHILS RELATIVE PERCENT: 19 %
GFR AFRICAN AMERICAN: 45
GFR NON-AFRICAN AMERICAN: 37
GLOBULIN: 3.9 G/DL
GLUCOSE BLD-MCNC: 165 MG/DL (ref 70–99)
GLUCOSE BLD-MCNC: 175 MG/DL (ref 70–99)
HCT VFR BLD CALC: 36.2 % (ref 36–48)
HEMATOLOGY PATH CONSULT: YES
HEMOGLOBIN: 11.8 G/DL (ref 12–16)
LACTIC ACID: 1.7 MMOL/L (ref 0.4–2)
LIPASE: 78 U/L (ref 13–60)
LYMPHOCYTES ABSOLUTE: 2.3 K/UL (ref 1–5.1)
LYMPHOCYTES RELATIVE PERCENT: 18 %
MCH RBC QN AUTO: 27.4 PG (ref 26–34)
MCHC RBC AUTO-ENTMCNC: 32.5 G/DL (ref 31–36)
MCV RBC AUTO: 84.4 FL (ref 80–100)
MONOCYTES ABSOLUTE: 0.4 K/UL (ref 0–1.3)
MONOCYTES RELATIVE PERCENT: 3 %
NEUTROPHILS ABSOLUTE: 7.7 K/UL (ref 1.7–7.7)
NEUTROPHILS RELATIVE PERCENT: 59 %
PDW BLD-RTO: 17 % (ref 12.4–15.4)
PERFORMED ON: ABNORMAL
PLATELET # BLD: 201 K/UL (ref 135–450)
PLATELET SLIDE REVIEW: ADEQUATE
PMV BLD AUTO: 8.2 FL (ref 5–10.5)
POTASSIUM REFLEX MAGNESIUM: 3.9 MMOL/L (ref 3.5–5.1)
RBC # BLD: 4.29 M/UL (ref 4–5.2)
SLIDE REVIEW: ABNORMAL
SODIUM BLD-SCNC: 138 MMOL/L (ref 136–145)
TOTAL PROTEIN: 7.7 G/DL (ref 6.4–8.2)
WBC # BLD: 12.9 K/UL (ref 4–11)

## 2019-01-14 PROCEDURE — 72072 X-RAY EXAM THORAC SPINE 3VWS: CPT

## 2019-01-14 PROCEDURE — 83690 ASSAY OF LIPASE: CPT

## 2019-01-14 PROCEDURE — 99285 EMERGENCY DEPT VISIT HI MDM: CPT

## 2019-01-14 PROCEDURE — 2580000003 HC RX 258: Performed by: EMERGENCY MEDICINE

## 2019-01-14 PROCEDURE — 85025 COMPLETE CBC W/AUTO DIFF WBC: CPT

## 2019-01-14 PROCEDURE — 86900 BLOOD TYPING SEROLOGIC ABO: CPT

## 2019-01-14 PROCEDURE — 86850 RBC ANTIBODY SCREEN: CPT

## 2019-01-14 PROCEDURE — 86901 BLOOD TYPING SEROLOGIC RH(D): CPT

## 2019-01-14 PROCEDURE — 2060000000 HC ICU INTERMEDIATE R&B

## 2019-01-14 PROCEDURE — 74176 CT ABD & PELVIS W/O CONTRAST: CPT

## 2019-01-14 PROCEDURE — 96375 TX/PRO/DX INJ NEW DRUG ADDON: CPT

## 2019-01-14 PROCEDURE — 80053 COMPREHEN METABOLIC PANEL: CPT

## 2019-01-14 PROCEDURE — 93798 PHYS/QHP OP CAR RHAB W/ECG: CPT

## 2019-01-14 PROCEDURE — 83605 ASSAY OF LACTIC ACID: CPT

## 2019-01-14 PROCEDURE — 85610 PROTHROMBIN TIME: CPT

## 2019-01-14 PROCEDURE — 71046 X-RAY EXAM CHEST 2 VIEWS: CPT

## 2019-01-14 PROCEDURE — 36415 COLL VENOUS BLD VENIPUNCTURE: CPT

## 2019-01-14 PROCEDURE — 6360000002 HC RX W HCPCS: Performed by: EMERGENCY MEDICINE

## 2019-01-14 PROCEDURE — 96361 HYDRATE IV INFUSION ADD-ON: CPT

## 2019-01-14 PROCEDURE — 96365 THER/PROPH/DIAG IV INF INIT: CPT

## 2019-01-14 RX ORDER — SODIUM CHLORIDE 0.9 % (FLUSH) 0.9 %
10 SYRINGE (ML) INJECTION EVERY 12 HOURS SCHEDULED
Status: DISCONTINUED | OUTPATIENT
Start: 2019-01-14 | End: 2019-01-21 | Stop reason: HOSPADM

## 2019-01-14 RX ORDER — DIGOXIN 125 MCG
125 TABLET ORAL DAILY
Status: DISCONTINUED | OUTPATIENT
Start: 2019-01-15 | End: 2019-01-21 | Stop reason: HOSPADM

## 2019-01-14 RX ORDER — PANTOPRAZOLE SODIUM 40 MG/1
40 TABLET, DELAYED RELEASE ORAL
Status: DISCONTINUED | OUTPATIENT
Start: 2019-01-15 | End: 2019-01-21 | Stop reason: HOSPADM

## 2019-01-14 RX ORDER — CARVEDILOL 3.12 MG/1
3.12 TABLET ORAL 2 TIMES DAILY WITH MEALS
Status: DISCONTINUED | OUTPATIENT
Start: 2019-01-15 | End: 2019-01-21 | Stop reason: HOSPADM

## 2019-01-14 RX ORDER — TORSEMIDE 20 MG/1
20 TABLET ORAL DAILY
Status: DISCONTINUED | OUTPATIENT
Start: 2019-01-15 | End: 2019-01-17

## 2019-01-14 RX ORDER — ROSUVASTATIN CALCIUM 10 MG/1
20 TABLET, COATED ORAL DAILY
Status: DISCONTINUED | OUTPATIENT
Start: 2019-01-15 | End: 2019-01-21 | Stop reason: HOSPADM

## 2019-01-14 RX ORDER — MONTELUKAST SODIUM 10 MG/1
10 TABLET ORAL NIGHTLY
Status: DISCONTINUED | OUTPATIENT
Start: 2019-01-15 | End: 2019-01-21 | Stop reason: HOSPADM

## 2019-01-14 RX ORDER — ONDANSETRON 2 MG/ML
4 INJECTION INTRAMUSCULAR; INTRAVENOUS EVERY 6 HOURS PRN
Status: DISCONTINUED | OUTPATIENT
Start: 2019-01-14 | End: 2019-01-21 | Stop reason: HOSPADM

## 2019-01-14 RX ORDER — SODIUM CHLORIDE 0.9 % (FLUSH) 0.9 %
10 SYRINGE (ML) INJECTION PRN
Status: DISCONTINUED | OUTPATIENT
Start: 2019-01-14 | End: 2019-01-21 | Stop reason: HOSPADM

## 2019-01-14 RX ORDER — 0.9 % SODIUM CHLORIDE 0.9 %
500 INTRAVENOUS SOLUTION INTRAVENOUS ONCE
Status: COMPLETED | OUTPATIENT
Start: 2019-01-14 | End: 2019-01-14

## 2019-01-14 RX ORDER — SPIRONOLACTONE 25 MG/1
25 TABLET ORAL DAILY
Status: DISCONTINUED | OUTPATIENT
Start: 2019-01-15 | End: 2019-01-17

## 2019-01-14 RX ORDER — DEXTROSE MONOHYDRATE 25 G/50ML
12.5 INJECTION, SOLUTION INTRAVENOUS PRN
Status: DISCONTINUED | OUTPATIENT
Start: 2019-01-14 | End: 2019-01-21 | Stop reason: HOSPADM

## 2019-01-14 RX ORDER — OXYCODONE HYDROCHLORIDE 5 MG/1
5 TABLET ORAL EVERY 6 HOURS PRN
Status: DISCONTINUED | OUTPATIENT
Start: 2019-01-14 | End: 2019-01-21 | Stop reason: HOSPADM

## 2019-01-14 RX ORDER — DEXTROSE MONOHYDRATE 50 MG/ML
100 INJECTION, SOLUTION INTRAVENOUS PRN
Status: DISCONTINUED | OUTPATIENT
Start: 2019-01-14 | End: 2019-01-21 | Stop reason: HOSPADM

## 2019-01-14 RX ORDER — ONDANSETRON 2 MG/ML
4 INJECTION INTRAMUSCULAR; INTRAVENOUS ONCE
Status: COMPLETED | OUTPATIENT
Start: 2019-01-14 | End: 2019-01-14

## 2019-01-14 RX ORDER — NICOTINE POLACRILEX 4 MG
15 LOZENGE BUCCAL PRN
Status: DISCONTINUED | OUTPATIENT
Start: 2019-01-14 | End: 2019-01-21 | Stop reason: HOSPADM

## 2019-01-14 RX ORDER — WARFARIN SODIUM 5 MG/1
5 TABLET ORAL EVERY EVENING
Status: DISCONTINUED | OUTPATIENT
Start: 2019-01-15 | End: 2019-01-16

## 2019-01-14 RX ADMIN — ONDANSETRON HYDROCHLORIDE 4 MG: 2 INJECTION, SOLUTION INTRAMUSCULAR; INTRAVENOUS at 20:20

## 2019-01-14 RX ADMIN — SODIUM CHLORIDE 500 ML: 9 INJECTION, SOLUTION INTRAVENOUS at 20:17

## 2019-01-14 RX ADMIN — HYDROMORPHONE HYDROCHLORIDE 0.5 MG: 1 INJECTION, SOLUTION INTRAMUSCULAR; INTRAVENOUS; SUBCUTANEOUS at 20:21

## 2019-01-14 RX ADMIN — TAZOBACTAM SODIUM AND PIPERACILLIN SODIUM 3.38 G: 375; 3 INJECTION, SOLUTION INTRAVENOUS at 21:55

## 2019-01-14 ASSESSMENT — ENCOUNTER SYMPTOMS
COLOR CHANGE: 0
SHORTNESS OF BREATH: 0
ABDOMINAL PAIN: 0
DIARRHEA: 0
CONSTIPATION: 0
BACK PAIN: 1
NAUSEA: 1
COUGH: 0
VOMITING: 1

## 2019-01-14 ASSESSMENT — PAIN SCALES - GENERAL
PAINLEVEL_OUTOF10: 9
PAINLEVEL_OUTOF10: 9

## 2019-01-15 PROBLEM — I50.42 CHRONIC COMBINED SYSTOLIC (CONGESTIVE) AND DIASTOLIC (CONGESTIVE) HEART FAILURE (HCC): Status: ACTIVE | Noted: 2019-01-15

## 2019-01-15 PROBLEM — R11.2 NAUSEA AND VOMITING: Status: ACTIVE | Noted: 2019-01-15

## 2019-01-15 LAB
BASOPHILS ABSOLUTE: 0 K/UL (ref 0–0.2)
BASOPHILS RELATIVE PERCENT: 0.1 %
BILIRUBIN URINE: NEGATIVE
BLOOD, URINE: NEGATIVE
CLARITY: CLEAR
COLOR: YELLOW
EOSINOPHILS ABSOLUTE: 4.2 K/UL (ref 0–0.6)
EOSINOPHILS RELATIVE PERCENT: 41.5 %
EPITHELIAL CELLS, UA: 3 /HPF (ref 0–5)
GLUCOSE BLD-MCNC: 136 MG/DL (ref 70–99)
GLUCOSE BLD-MCNC: 165 MG/DL (ref 70–99)
GLUCOSE URINE: NEGATIVE MG/DL
HCT VFR BLD CALC: 34.3 % (ref 36–48)
HEMATOLOGY PATH CONSULT: NORMAL
HEMOGLOBIN: 11.4 G/DL (ref 12–16)
HYALINE CASTS: 2 /LPF (ref 0–8)
INR BLD: 1.7 (ref 0.86–1.14)
INR BLD: 1.73 (ref 0.86–1.14)
KETONES, URINE: NEGATIVE MG/DL
LEUKOCYTE ESTERASE, URINE: ABNORMAL
LYMPHOCYTES ABSOLUTE: 1.4 K/UL (ref 1–5.1)
LYMPHOCYTES RELATIVE PERCENT: 14.1 %
MCH RBC QN AUTO: 28.3 PG (ref 26–34)
MCHC RBC AUTO-ENTMCNC: 33.3 G/DL (ref 31–36)
MCV RBC AUTO: 84.8 FL (ref 80–100)
MICROSCOPIC EXAMINATION: YES
MONOCYTES ABSOLUTE: 0.7 K/UL (ref 0–1.3)
MONOCYTES RELATIVE PERCENT: 7.5 %
NEUTROPHILS ABSOLUTE: 3.7 K/UL (ref 1.7–7.7)
NEUTROPHILS RELATIVE PERCENT: 36.8 %
NITRITE, URINE: NEGATIVE
PDW BLD-RTO: 17.2 % (ref 12.4–15.4)
PERFORMED ON: ABNORMAL
PERFORMED ON: ABNORMAL
PH UA: 5.5
PLATELET # BLD: 181 K/UL (ref 135–450)
PMV BLD AUTO: 8.4 FL (ref 5–10.5)
PROTEIN UA: NEGATIVE MG/DL
PROTHROMBIN TIME: 19.4 SEC (ref 9.8–13)
PROTHROMBIN TIME: 19.7 SEC (ref 9.8–13)
RBC # BLD: 4.04 M/UL (ref 4–5.2)
RBC UA: 1 /HPF (ref 0–4)
SPECIFIC GRAVITY UA: 1.02
URINE REFLEX TO CULTURE: YES
URINE TYPE: ABNORMAL
UROBILINOGEN, URINE: 1 E.U./DL
WBC # BLD: 10 K/UL (ref 4–11)
WBC UA: 8 /HPF (ref 0–5)

## 2019-01-15 PROCEDURE — 85025 COMPLETE CBC W/AUTO DIFF WBC: CPT

## 2019-01-15 PROCEDURE — 81001 URINALYSIS AUTO W/SCOPE: CPT

## 2019-01-15 PROCEDURE — 2060000000 HC ICU INTERMEDIATE R&B

## 2019-01-15 PROCEDURE — 6370000000 HC RX 637 (ALT 250 FOR IP): Performed by: INTERNAL MEDICINE

## 2019-01-15 PROCEDURE — 6360000002 HC RX W HCPCS: Performed by: INTERNAL MEDICINE

## 2019-01-15 PROCEDURE — 85610 PROTHROMBIN TIME: CPT

## 2019-01-15 PROCEDURE — 2500000003 HC RX 250 WO HCPCS: Performed by: INTERNAL MEDICINE

## 2019-01-15 PROCEDURE — APPSS60 APP SPLIT SHARED TIME 46-60 MINUTES: Performed by: NURSE PRACTITIONER

## 2019-01-15 PROCEDURE — 99222 1ST HOSP IP/OBS MODERATE 55: CPT | Performed by: SURGERY

## 2019-01-15 PROCEDURE — 2580000003 HC RX 258: Performed by: INTERNAL MEDICINE

## 2019-01-15 PROCEDURE — 36415 COLL VENOUS BLD VENIPUNCTURE: CPT

## 2019-01-15 PROCEDURE — APPNB30 APP NON BILLABLE TIME 0-30 MINS: Performed by: NURSE PRACTITIONER

## 2019-01-15 PROCEDURE — 87086 URINE CULTURE/COLONY COUNT: CPT

## 2019-01-15 PROCEDURE — 2580000003 HC RX 258

## 2019-01-15 RX ORDER — SODIUM CHLORIDE 9 MG/ML
INJECTION, SOLUTION INTRAVENOUS
Status: COMPLETED
Start: 2019-01-15 | End: 2019-01-15

## 2019-01-15 RX ORDER — IPRATROPIUM BROMIDE AND ALBUTEROL SULFATE 2.5; .5 MG/3ML; MG/3ML
1 SOLUTION RESPIRATORY (INHALATION) EVERY 4 HOURS PRN
Status: DISCONTINUED | OUTPATIENT
Start: 2019-01-15 | End: 2019-01-21 | Stop reason: HOSPADM

## 2019-01-15 RX ORDER — WARFARIN SODIUM 7.5 MG/1
7.5 TABLET ORAL
Status: COMPLETED | OUTPATIENT
Start: 2019-01-15 | End: 2019-01-15

## 2019-01-15 RX ADMIN — CARVEDILOL 3.12 MG: 3.12 TABLET, FILM COATED ORAL at 01:00

## 2019-01-15 RX ADMIN — HYDROMORPHONE HYDROCHLORIDE 0.5 MG: 1 INJECTION, SOLUTION INTRAMUSCULAR; INTRAVENOUS; SUBCUTANEOUS at 01:08

## 2019-01-15 RX ADMIN — Medication 10 ML: at 09:44

## 2019-01-15 RX ADMIN — INSULIN LISPRO 1 UNITS: 100 INJECTION, SOLUTION INTRAVENOUS; SUBCUTANEOUS at 01:01

## 2019-01-15 RX ADMIN — TAZOBACTAM SODIUM AND PIPERACILLIN SODIUM 3.38 G: 375; 3 INJECTION, SOLUTION INTRAVENOUS at 04:50

## 2019-01-15 RX ADMIN — PANTOPRAZOLE SODIUM 40 MG: 40 TABLET, DELAYED RELEASE ORAL at 06:08

## 2019-01-15 RX ADMIN — Medication 10 ML: at 21:38

## 2019-01-15 RX ADMIN — WARFARIN SODIUM 7.5 MG: 7.5 TABLET ORAL at 01:00

## 2019-01-15 RX ADMIN — INSULIN GLARGINE 45 UNITS: 100 INJECTION, SOLUTION SUBCUTANEOUS at 09:39

## 2019-01-15 RX ADMIN — HYDROMORPHONE HYDROCHLORIDE 0.5 MG: 1 INJECTION, SOLUTION INTRAMUSCULAR; INTRAVENOUS; SUBCUTANEOUS at 18:57

## 2019-01-15 RX ADMIN — ROSUVASTATIN CALCIUM 20 MG: 10 TABLET, FILM COATED ORAL at 09:36

## 2019-01-15 RX ADMIN — TORSEMIDE 20 MG: 20 TABLET ORAL at 09:37

## 2019-01-15 RX ADMIN — DIGOXIN 125 MCG: 125 TABLET ORAL at 09:36

## 2019-01-15 RX ADMIN — Medication 10 ML: at 01:09

## 2019-01-15 RX ADMIN — CARVEDILOL 3.12 MG: 3.12 TABLET, FILM COATED ORAL at 17:10

## 2019-01-15 RX ADMIN — CARVEDILOL 3.12 MG: 3.12 TABLET, FILM COATED ORAL at 09:36

## 2019-01-15 RX ADMIN — SODIUM CHLORIDE 250 ML: 9 INJECTION, SOLUTION INTRAVENOUS at 04:50

## 2019-01-15 RX ADMIN — HYDROMORPHONE HYDROCHLORIDE 0.5 MG: 1 INJECTION, SOLUTION INTRAMUSCULAR; INTRAVENOUS; SUBCUTANEOUS at 10:10

## 2019-01-15 RX ADMIN — MONTELUKAST SODIUM 10 MG: 10 TABLET, FILM COATED ORAL at 21:37

## 2019-01-15 RX ADMIN — TAZOBACTAM SODIUM AND PIPERACILLIN SODIUM 3.38 G: 375; 3 INJECTION, SOLUTION INTRAVENOUS at 12:05

## 2019-01-15 RX ADMIN — ONDANSETRON HYDROCHLORIDE 4 MG: 2 INJECTION, SOLUTION INTRAMUSCULAR; INTRAVENOUS at 06:08

## 2019-01-15 RX ADMIN — TAZOBACTAM SODIUM AND PIPERACILLIN SODIUM 3.38 G: 375; 3 INJECTION, SOLUTION INTRAVENOUS at 21:37

## 2019-01-15 RX ADMIN — HYDROMORPHONE HYDROCHLORIDE 0.5 MG: 1 INJECTION, SOLUTION INTRAMUSCULAR; INTRAVENOUS; SUBCUTANEOUS at 06:08

## 2019-01-15 RX ADMIN — WARFARIN SODIUM 5 MG: 5 TABLET ORAL at 17:10

## 2019-01-15 RX ADMIN — HYDROMORPHONE HYDROCHLORIDE 0.5 MG: 1 INJECTION, SOLUTION INTRAMUSCULAR; INTRAVENOUS; SUBCUTANEOUS at 14:59

## 2019-01-15 RX ADMIN — SPIRONOLACTONE 25 MG: 25 TABLET ORAL at 09:37

## 2019-01-15 ASSESSMENT — PAIN SCALES - GENERAL
PAINLEVEL_OUTOF10: 0
PAINLEVEL_OUTOF10: 5
PAINLEVEL_OUTOF10: 4
PAINLEVEL_OUTOF10: 5
PAINLEVEL_OUTOF10: 5
PAINLEVEL_OUTOF10: 6
PAINLEVEL_OUTOF10: 7
PAINLEVEL_OUTOF10: 4
PAINLEVEL_OUTOF10: 7

## 2019-01-15 ASSESSMENT — PAIN DESCRIPTION - PROGRESSION
CLINICAL_PROGRESSION: RESOLVED
CLINICAL_PROGRESSION: GRADUALLY WORSENING
CLINICAL_PROGRESSION: GRADUALLY WORSENING

## 2019-01-15 ASSESSMENT — PAIN DESCRIPTION - DIRECTION
RADIATING_TOWARDS: RIGHT SHOULDER, NECK
RADIATING_TOWARDS: RIGHT SHOULDER

## 2019-01-15 ASSESSMENT — PAIN DESCRIPTION - ORIENTATION
ORIENTATION: RIGHT;LOWER

## 2019-01-15 ASSESSMENT — PAIN DESCRIPTION - DESCRIPTORS
DESCRIPTORS: SORE
DESCRIPTORS: CONSTANT;ACHING
DESCRIPTORS: SORE

## 2019-01-15 ASSESSMENT — PAIN DESCRIPTION - FREQUENCY
FREQUENCY: CONTINUOUS

## 2019-01-15 ASSESSMENT — PAIN DESCRIPTION - PAIN TYPE
TYPE: ACUTE PAIN
TYPE: ACUTE PAIN
TYPE: CHRONIC PAIN
TYPE: ACUTE PAIN

## 2019-01-15 ASSESSMENT — PAIN DESCRIPTION - LOCATION
LOCATION: BACK

## 2019-01-15 ASSESSMENT — PAIN DESCRIPTION - ONSET
ONSET: ON-GOING

## 2019-01-15 NOTE — PROGRESS NOTES
C/O level 5/10 right lower back pain and nausea, gave Dilaudid and Zofran per prn orders with good result.evelyne

## 2019-01-15 NOTE — CARE COORDINATION
[address] Transitions Interview     1/15/2019    Patient: Daljit Moreno Patient : 1945   MRN: [id number]  Reason for Admission: abd pain  RARS: Readmission Risk Score: 29       Spoke with: Daljit Moreno      Readmission Risk  Patient Active Problem List   Diagnosis    Long term current use of anticoagulant    Mixed hyperlipidemia    Essential hypertension    Generalized osteoarthrosis, involving multiple sites    Eosinophilic gastritis    Paroxysmal SVT (supraventricular tachycardia) (HCC)    B12 deficiency    Supratherapeutic INR    History of pulmonary embolism    Multiple pulmonary nodules    Diabetes mellitus type 2 in obese ([address]    Asthma    Atrial fibrillation ([facility]    Hypertension    Abnormal electrocardiogram    Palpitation    Cardiac arrhythmia    Unstable angina (HCC)    Weight loss counseling, encounter for    CAD (coronary artery disease)    Non-rheumatic mitral regurgitation    S/P mitral valve replacement with bioprosthetic valve    Status post aorto-coronary artery bypass graft    S/P Maze operation for atrial fibrillation    Goiter    Primary osteoarthritis of both knees    Squamous cell carcinoma of lip    Splenic infarct    Abdominal pain, right upper quadrant    Acute colitis    Infection of superficial incisional surgical site after procedure    Obesity, Class I, BMI 30-34.9    Acute encephalopathy    Acute on chronic combined systolic and diastolic congestive heart failure (HCC)    Intra-abdominal free air of unknown etiology    Nausea and vomiting    Chronic combined systolic (congestive) and diastolic (congestive) heart failure [facility])       Inpatient Assessment  Care Transitions Summary    Care Transitions Inpatient Review  Medication Review  Do you have all of your prescriptions and are they filled?:  Yes   Are you able to afford your medications?:  Yes  How often do you have difficulty taking your medications?:  I always take them as prescribed. Housing Review  Who do you live with?:  Partner/Spouse/SO  Are you an active caregiver in your home?:  No  Social Support  Do you have a ?:  No  Do you have a [facility]?:  No  Durable Medical Equipment  Patient DME:  Shower chair, Straight cane, Walker  Functional Review  Ability to seek help/take action for Emergent/Urgent situations i.e. fire, crime, inclement weather or health crisis. :  Independent  Ability handle personal hygiene needs (bathing/dressing/grooming): Independent  Ability to manage medications: Independent  Ability to prepare food:  Needs Assistance  Ability to maintain home (clean home, laundry):  Needs Assistance  Ability to drive and/or has transportation:  Dependent  Ability to do shopping:  Independent  Ability to manage finances: Independent  Is patient able to live independently?:  Yes  Hearing and Vision  Visual Impairment:  Reading glasses  Hearing Impairment:  None  Care Transitions Interventions  No Identified Needs       Plan to return home with spouse, no HHC or DME needs at this time. Agreed to CTC f/u calls after d/c.       Follow Up  Future Appointments  Date Time Provider Tawana Young   1/16/2019 1:00 PM [facility] CARD PULM EXERCISE 2 [facility]IAC None   1/18/2019 1:00 PM [facility] CARD PULM EXERCISE 2 [facility] None   1/21/2019 1:00 PM [facility] CARD PULM EXERCISE 2 [facility] None   1/22/2019 7:30 AM Cj Darling MD  Card[facility]   1/22/2019 8:45 AM SCHEDULE, [place] PHONE TRANSMISSION  Card[facility]   1/23/2019 1:00 PM [facility] CARD PULM EXERCISE 2 [facility] None   1/25/2019 1:00 PM [facility] CARD PULM EXERCISE 2 [facility] None   1/28/2019 1:00 PM [facility] CARD PULM EXERCISE 2 [facility] None   1/29/2019 8:00 AM Michaelyn Spurling, MD [facility]   1/30/2019 1:00 PM [facility] CARD PULM EXERCISE 2 [facility]ARDI[facility] None   2/1/2019 1:00 PM [facility] CARD PULM EXERCISE 2 [facility] None   2/4/2019 1:00 PM [facility] CARD PULM EXERCISE 2 [facility]AC None 2/6/2019 1:00 PM [facility] CARD PULM EXERCISE 2 [facility]AC None   2/8/2019 1:00 PM [facility] CARD PULM EXERCISE 2 [facility] CARDIAC None   2/11/2019 1:00 PM [facility] CARD PULM EXERCISE 2 [facility]AC None   2/13/2019 1:00 PM [facility] CARD PULM EXERCISE 2 [facility]ARD[facility]AC None   2/15/2019 1:00 PM [facility] CARD PULM EXERCISE 2 [facility] CARD[facility]AC None   2/18/2019 1:00 PM [facility] CARD PULM EXERCISE 2 [facility] CARD[facility]AC None   2/20/2019 1:00 PM [facility] CARD PULM EXERCISE 2 [facility]AC None   2/22/2019 1:00 PM [facility] CARD PULM EXERCISE 2 [facility] None   2/25/2019 1:00 PM [facility] CARD PULM EXERCISE 2 [facility]ARD[facility]AC None   2/27/2019 1:00 PM [facility] CARD PULM EXERCISE 2 [facility]AC None   3/1/2019 1:00 PM [facility] CARD PULM EXERCISE 2 [facility]AC None   3/4/2019 1:00 PM [facility] CARD PULM EXERCISE 2 [facility]AC None   3/6/2019 1:00 PM [facility] CARD PULM EXERCISE 2 [facility]ARD[facility]AC None   3/8/2019 1:00 PM [facility] CARD PULM EXERCISE 2 [facility] CARDIAC None       Health Maintenance  There are no preventive care reminders to display for this patient.     Felipe Villanueva RN

## 2019-01-15 NOTE — CONSULTS
[facility]  Consultation Note    Chief Complaint / Reason for Consultation  Mesenteric calcifications    History of Present Illness  Patient is a 68 y.o. female with past medical history of CAD s/p CABG and MVR in August 2018, diabetes mellitus, PAF, hypertension, hyperlipidemia and DVT/PE who presented to the ED with complaints of right sided back pain for that has been ongoing for the past 4-5 days. She reports some nausea but no vomiting currently. She reports the pain radiates to her right shoulder. Her PCP ordered an x-ray of the thoracic spine and chest x-ray which showed possible free air under the right and left hemidiaphragm and therefore she was sent to the ED for further evaluation. CT of the abdomen and pelvis was ordered which showed small bowel pneumatosis as well as free air. She had a similar episode back in July. She was admitted and started on antibiotics and General Surgery was consulted. We have been asked to see patient regarding concern for mesenteric calcifications seen on CT scan. Review of Systems  + right shoulder and back pain, + nausea. Denies fevers, chills, chest pain, shortness of breath, vomiting, hematemesis, diarrhea, constipation, melena, hematochezia, wt changes, vision problems, blindness, hearing problems, facial droop, slurred speech, extremity weakness, extremity numbness, dysuria.     Past Medical History:   Diagnosis Date    Asthma     Atrial fibrillation (HCC)     Eosinophilia     Hemoptysis     HIGH CHOLESTEROL     Hx of blood clots     Hypertension     Irregular heart beat     Other specified gastritis without mention of hemorrhage     Palpitations     Skin cancer     basal and squamous    Type II or unspecified type diabetes mellitus without mention of complication, not stated as uncontrolled        Past Surgical History:   Procedure Laterality Date    BRONCHOSCOPY  07/18/2016    Dr. Lv Guallpa - brushings from L  CARDIAC CATHETERIZATION  08/16/2018    Dr. Catie Mccarty  02/07/2017    Dr. Lennon Salvage - sigmoid diverticulosis, polypectomies x3    COLONOSCOPY  01/17/2014    Dr. Lennon Salvage - sigmoid diverticulosis, polypectomies x3, internal hemorrhoids    COLONOSCOPY  10/10/2018    w/biopsy performed by Anne-Marie José MD at [address]  08/21/2018    Dr. Danica Castro - x3 (LIMA-LAD, L SV-D1-PLV) modified BL MAZE procedure w/obliteration of WAYNE using 45mm AtriClip    HYSTERECTOMY      INSERTABLE CARDIAC MONITOR Left 08/16/2018    Dr. Neri Wright - [employer] SN# [id number] [employer]    MITRAL VALVE REPLACEMENT  08/21/2018    Dr. Danica Castro - 27mm Medtronic Cinch tissue valve    SKIN BIOPSY      TRANSESOPHAGEAL ECHOCARDIOGRAM  08/21/2018    during CABG/MVR    TUNNELED VENOUS CATHETER PLACEMENT Left 08/23/2018    Dr. Roshni Christine for HD    UPPER GASTROINTESTINAL ENDOSCOPY N/A 10/10/2018    w/biopsy performed by Anne-Marie José MD at [address] [place] [address]   Allergen Reactions    Hxnlyafb-Oropbvp-Buncwy [Fluocinolone] Shortness Of Breath    Ciprofloxacin Shortness Of Breath    Diovan [Valsartan] Shortness Of Breath    Flagyl [Metronidazole] Shortness Of Breath     Has taken diflucan at home 12/7/15    Metformin And Related [Metformin And Related] Shortness Of Breath    Benazepril      Other reaction(s): Not Recorded    Morphine      Bad reaction. \"makes her feel horrible\".  Saxagliptin      Other reaction(s): Not Recorded    Levaquin [Levofloxacin] Rash       Social History     Social History    Marital status:      Spouse name: N/A    Number of children: N/A    Years of education: N/A     Occupational History    Not on file.      Social History Main Topics    Smoking status: Never Smoker    Smokeless tobacco: Never Used    Alcohol use No    Drug use: No    Sexual activity: Not on file     Other Topics Concern  Not on file     Social History Narrative    No narrative on file       Family History   Problem Relation Age of Onset    Cancer Father     Asthma Mother     Hypertension Mother     Heart Disease Mother     High Blood Pressure Mother      - No history of bleeding or clotting disorders    Vital Signs  Vitals:    01/15/19 0430 01/15/19 0706 01/15/19 0930 01/15/19 1203   BP: 136/85  105/75 119/72   Pulse: 88  88 80   Resp: 20  20 20   Temp: 97.2 °F (36.2 °C)  97.5 °F (36.4 °C) 96.8 °F (36 °C)   TempSrc: Temporal  Temporal Temporal   SpO2: 96%  93% 94%   Weight:  210 lb 9.6 oz (95.5 kg)     Height:           Physical Examination  General: no apparent distress  Psychiatric: affect appropriate  Head/Eyes/Ears/Nose/Throat:  Atraumatic, vision and hearing intact, face symmetric  Neck:  supple  Chest/Lungs: clear to auscultation bilaterally  Cardiac:  Regular rate and rhythm  Abdomen: soft, nontender  Extremities: warm and well perfused  - bilateral upper extremity motorsensory intact  - bilateral lower extremity motorsensory intact  Vascular exam: palpable radial and pedal pulses      Labs  Lab Results   Component Value Date    WBC 10.0 01/15/2019    HGB 11.4 01/15/2019    HCT 34.3 01/15/2019    MCV 84.8 01/15/2019     01/15/2019     Lab Results   Component Value Date     01/14/2019    K 3.9 01/14/2019     01/14/2019    CO2 25 01/14/2019    PHOS 2.9 10/05/2018    BUN 20 01/14/2019    CREATININE 1.4 01/14/2019      No components found for: GLU    Scheduled Meds:    piperacillin-tazobactam  3.375 g Intravenous Q8H    insulin glargine  45 Units Subcutaneous Daily    sodium chloride flush  10 mL Intravenous 2 times per day    montelukast  10 mg Oral Nightly    warfarin  5 mg Oral QPM    rosuvastatin  20 mg Oral Daily    carvedilol  3.125 mg Oral BID WC    digoxin  125 mcg Oral Daily    spironolactone  25 mg Oral Daily    torsemide  20 mg Oral Daily    pantoprazole  40 mg Oral QAM AC    insulin lispro  0-12 Units Subcutaneous TID     insulin lispro  0-6 Units Subcutaneous Nightly     Continuous Infusions:    dextrose         Imaging:     CT abd/pelvis w/o contrast 1/14/19:  Impression   1. Free intraperitoneal air.  Small bowel pneumatosis.  Bowel ischemia   perforation should be excluded.  In asymptomatic patient, benign pneumatosis   intestinalis is a consideration.  Note is made that the patient has   demonstrated pneumatosis and free air on prior examinations. 2. Extensive atherosclerosis. 3. There is a 7.5 cm simple left adnexal cystic lesion, not changed since   10/01/2018. Assessment:   Intra-abdominal free air  Mesenteric calcifications seen on CT scan  CAD s/p CABG and MVR in August 2018  Atrial fibrillation on Coumadin  Diabetes mellitus  Hypertension   Hyperlipemia     Plan:  CT reviewed with Dr. Charmayne Ar which shows calcified aorta and splenic calcifications. No need for any vascular intervention at this time. Recommend adding antiplatelet to daily regimen, Aspirin 81 mg daily. Continue statin. Further workup and management per primary and General Surgery. Plan discussed with Dr. Charmayne Ar. Thank you for the consultation. Patient educated on plan of care and disease process. All questions answered.         Electronically signed by SOLEDAD Max CNP on 1/15/2019 at 2:31 PM

## 2019-01-15 NOTE — ED PROVIDER NOTES
I personally evaluated and examined the patient in conjunction with the APC and agree with the assessment, treatment plan and disposition of the patient has recorded by the APC. I reviewed pertinent nurse's notes, triage notes, vital signs, past medical history, family and social history, medications, and allergies. Complete review of systems was conducted by the mid-level provider and/or myself. Review of systems is negative except as documented in the history of present illness. 56-year-old female presents to the emergency department with chief complaint right-sided thoracic back pain for the last 5 days after burping. She also complains of nausea but no current vomiting. Pain is currently described as sharp, constant, 9/10 with radiation to the right shoulder. Her primary care provider ordered an x-ray of the thoracic spine and chest x-ray which showed possible free air under the right and left hemidiaphragm. Therefore, she was sent in for evaluation. She thinks she has a history of previous perforated viscus that did not require surgery. General: Patient is in no acute distress   Head: Normocephalic, atraumatic, pupils are equal and reactive to light. EOMI. Neck: Neck is supple. No JVD noted. Heart: RRR no murmurs, rubs, or gallops   Lungs: CTA BL   Abdomen: soft, non-tender, non-distended   Extremities: no lower extremity edema. Capillary refill is less than 2 seconds   Skin: no cyanosis or pallor; no rashes noted   Neuro: CN's 2-12 are grossly intact. No focal neurologic deficit appreciated. Back: Minimal right-sided paravertebral muscle tenderness to palpation with normal strength and sensation bilateral lower extremities. Pain medications, IV fluids, antinausea medications, CT abdomen pelvis was ordered. Shows pneumatosis intestinalis as well as free air. She had a similar episode back in July.   Case discussed with Dr. Gianna Singh from general surgery who requested n.p.o. and broad-spectrum antibiotics and patient to be admitted to the hospitalist.    FINAL IMPRESSION     1. Intra-abdominal free air of unknown etiology    2. Pneumatosis intestinalis    3. Acute right-sided thoracic back pain            Electronically signed by:   Carlton Felder DO  01/14/19 2124

## 2019-01-15 NOTE — ED NOTES
Report called to [facility] on 3T. V/u, denies questions at this time. Tele monitor in place with visual on monitors. HR 80s. Pt taken to the floor by ED Tech and belongings in tow. Pt a&o with no signs of distress. No medications infusing during time of transport.         Rita Szymanski RN  01/14/19 4121

## 2019-01-15 NOTE — PLAN OF CARE
Problem: Pain:  Goal: Pain level will decrease  Pain level will decrease   Outcome: Ongoing     C/O right lower back pain, level 5/10, says pain radiates to right shoulder and neck, denies nausea.   Gave Dilaudid per prn order.[email or web address]

## 2019-01-15 NOTE — H&P
HOSPITALISTS HISTORY AND PHYSICAL    1/14/2019 11:58 PM    Patient Information:  Elier Cazares is a 68 y.o. female [id number]  PCP:  Robert Haney MD (Tel: [phone] )    Chief complaint:    Chief Complaint   Patient presents with    Abdominal Pain     abdominal and back pain under R ribs that started four days ago. + N/V. sent by Dr. Olaf Doe to rule out perforated bowel. History of Present Illness:  Cindy Newman is 68 y.o. female who presented with complaint of abdominal and back pain. Symptom onset was acute for a time period of 4 days. The severity is described as moderate. The course of his symptoms over time is constant. The symptoms improved with pain medications and worsened with none. The patient's symptom is associated with nausea and vomiting. Cindy Newman is 68 y.o. female with history of Afib, DM II and HTN  She presents with complaint of abdominal and back pain under right ribs   It does not radiate to anywhere but symptom is persistent   CT abd/pelvis shows - free intraperitoneal air    History obtained from patient and chart review. Old medical records show -  No recent admission to this hospital                                                                                                                                                                                                                                                                                                  REVIEW OF SYSTEMS:   Constitutional:  Negative for fever,chills or night sweats  ENT:  Negative for rhinorrhea, epistaxis, hoarseness, sore throat.   Respiratory:   Negative for shortness of breath,wheezing  Cardiovascular:   Negative for  chest pain, palpitations   Gastrointestinal:  Positive for nausea, vomiting, negative diarrhea  Genitourinary:  Negative for polyuria, dysuria   Hematologic/Lymphatic:  Negative for  bleeding tendency, easy bruising  Musculoskeletal:  Negative for myalgias and arthralgias  Neurologic:  Negative for  confusion,dysarthria. Skin:  Negative for itching,rash  Psychiatric:  Negative for depression,anxiety, agitation. Endocrine:  Negative for polydipsia,polyuria,heat /cold intolerance. Past Medical History:   has a past medical history of Asthma; Atrial fibrillation ([address] Eosinophilia; Hemoptysis; HIGH CHOLESTEROL; Hx of blood clots; Hypertension; Irregular heart beat; Other specified gastritis without mention of hemorrhage; Palpitations; Skin cancer; and Type II or unspecified type diabetes mellitus without mention of complication, not stated as uncontrolled. Past Surgical History:   has a past surgical history that includes Cholecystectomy;  section; Colonoscopy (2017); skin biopsy; bronchoscopy (2016); Coronary artery bypass graft (2018); Mitral valve replacement (2018); transesophageal echocardiogram (2018); Tunneled venous catheter placement (Left, 2018); Cardiac catheterization (2018); Insertable Cardiac Monitor (Left, 2018); Colonoscopy (2014); Hysterectomy; Upper gastrointestinal endoscopy (N/A, 10/10/2018); and Colonoscopy (10/10/2018). Medications:  No current facility-administered medications on file prior to encounter. Current Outpatient Prescriptions on File Prior to Encounter   Medication Sig Dispense Refill    ondansetron (ZOFRAN) 4 MG tablet Take 1 tablet by mouth 3 times daily as needed for Nausea or Vomiting 30 tablet 0    oxyCODONE (ROXICODONE) 5 MG immediate release tablet Take 1 tablet by mouth every 6 hours as needed for Pain for up to 30 days. . 100 tablet 0    warfarin (COUMADIN) 5 MG tablet Take 1 tablet by mouth daily Take 7.5 mg M W Fri, 5 mg on other days--inr with Dr Bela Betancourt (Patient taking differently: Take 5 mg by mouth daily Take 7.5 mg M Fri, 5 mg on other days--inr with Dr Bela Betancourt) 30 tablet 3    albuterol sulfate HFA (PROAIR HFA) 108 (90 BASE) MCG/ACT inhaler Inhale 2 puffs into the lungs every 6 hours as needed for Wheezing 3 Inhaler 3    insulin glargine (LANTUS) 100 UNIT/ML injection pen Inject 45 Units into the skin every morning 30 pen 3    omeprazole (PRILOSEC) 40 MG delayed release capsule Take 1 capsule by mouth every morning (before breakfast) 30 capsule 5    rosuvastatin (CRESTOR) 20 MG tablet Take 1 tablet by mouth daily 90 tablet 1    exenatide (BYETTA 10 MCG PEN) 10 MCG/0.04ML injection Inject 0.04 mLs into the skin 2 times daily (with meals) 3 pen 3    potassium chloride (KLOR-CON M) 10 MEQ extended release tablet Take 1 tablet by mouth daily 90 tablet 1    spironolactone (ALDACTONE) 25 MG tablet Take 1 tablet by mouth daily 90 tablet 1    torsemide (DEMADEX) 20 MG tablet Take 1 tablet by mouth daily 30 tablet 3    digoxin (LANOXIN) 125 MCG tablet Take 1 tablet by mouth daily 90 tablet 1    vitamin B-12 500 MCG tablet Take 1 tablet by mouth daily 30 tablet 3    carvedilol (COREG) 3.125 MG tablet Take 1 tablet by mouth 2 times daily (with meals) 180 tablet 1    Blood Glucose Monitoring Suppl (FREESTYLE LITE) GAETANO 1 Device by Does not apply route 4 times daily Patient to check blood sugar 4 times a day and PRN, she is treated with multiple daily injections of insulin that require correction dosing ;A1C 8.1 ; ICD code-E11.65 ,Z79.4 (Patient taking differently: 1 Device by Does not apply route 2 times daily ) 1 Device 0    blood glucose test strips (FREESTYLE LITE) strip 1 each by Does not apply route 4 times daily Patient to check blood sugar 4 times a day and PRN, she is treated with multiple daily injections of insulin that require correction dosing ;A1C 8.1 ; ICD code-E11.65 ,Z79.4 (Patient taking differently: 1 each by Does not apply route 2 times daily ) 100 each 5    FREESTYLE LANCETS MISC 1 each by Does not apply route 4 times daily Patient to check blood sugar 4 times a day and PRN, she is treated with multiple daily injections of insulin that require correction dosing ;A1C 8.1 ; ICD code-E11.65 ,Z79.4 (Patient taking differently: 1 each by Does not apply route 2 times daily ) 100 each 5    montelukast (SINGULAIR) 10 MG tablet TAKE 1 TABLET NIGHTLY 90 tablet 3    polyethylene glycol (GLYCOLAX) powder FILL DOSING CUP TO MARKED LINE (17 GRAMS) THEN MIX IN LIQUID AND DRINK DAILY 1581 g 3    nystatin (MYCOSTATIN) 818483 UNIT/ML suspension 1 teaspoon 4 times daily x 5 d 120 mL 5       Allergies: Allergies   Allergen Reactions    Olgclutw-Btbugrr-Yvjqol [Fluocinolone] Shortness Of Breath    Ciprofloxacin Shortness Of Breath    Diovan [Valsartan] Shortness Of Breath    Flagyl [Metronidazole] Shortness Of Breath     Has taken diflucan at home 12/7/15    Metformin And Related [Metformin And Related] Shortness Of Breath    Benazepril      Other reaction(s): Not Recorded    Morphine      Bad reaction. \"makes her feel horrible\".  Saxagliptin      Other reaction(s): Not Recorded    Levaquin [Levofloxacin] Rash        Social History:   reports that she has never smoked. She has never used smokeless tobacco. She reports that she does not drink alcohol or use drugs. Family History:  family history includes Asthma in her mother; Cancer in her father; Heart Disease in her mother; High Blood Pressure in her mother; Hypertension in her mother. Physical Exam:  BP (!) 140/79   Pulse 97   Temp 98.2 °F (36.8 °C) (Oral)   Resp 20   Ht 5' 8\" (1.727 m)   Wt 210 lb 9.6 oz (95.5 kg)   SpO2 97%   BMI 32.02 kg/m²     General appearance:  Appears comfortable. Well nourished  Eyes: Sclera clear, pupils equal  ENT: Moist mucus membranes, no thrush. Trachea midline. Cardiovascular: Regular rhythm, normal S1, S2. No murmur, gallop, rub.  No edema in lower extremities  Respiratory: Clear to auscultation bilaterally, no wheeze, good inspiratory effort  Gastrointestinal: Abdomen soft, non-tender, not distended, normal bowel sounds  Musculoskeletal: No cyanosis in digits, neck supple  Neurology: Cranial nerves grossly intact. Alert and oriented in time, place and person. No speech or motor deficits  Psychiatry: Appropriate affect. Not agitated  Skin: Warm, dry, normal turgor, no rash    Labs:  CBC:   Lab Results   Component Value Date    WBC 12.9 01/14/2019    RBC 4.29 01/14/2019    HGB 11.8 01/14/2019    HCT 36.2 01/14/2019    MCV 84.4 01/14/2019    MCH 27.4 01/14/2019    MCHC 32.5 01/14/2019    RDW 17.0 01/14/2019     01/14/2019    MPV 8.2 01/14/2019     BMP:    Lab Results   Component Value Date     01/14/2019    K 3.9 01/14/2019     01/14/2019    CO2 25 01/14/2019    BUN 20 01/14/2019    CREATININE 1.4 01/14/2019    CALCIUM 9.3 01/14/2019    GFRAA 45 01/14/2019    LABGLOM 37 01/14/2019    LABGLOM 45 12/06/2018    GLUCOSE 175 01/14/2019       Chest Xray:   EKG:    I visualized CT images     CT abdomen and pelvis   1. Free intraperitoneal air.  Small bowel pneumatosis.  Bowel ischemia  perforation should be excluded.  In asymptomatic patient, benign pneumatosis  intestinalis is a consideration.  Note is made that the patient has  demonstrated pneumatosis and free air on prior examinations. 2. Extensive atherosclerosis. 3. There is a 7.5 cm simple left adnexal cystic lesion, not changed since  10/01/2018. Discussed case with ED provider - MUNDO Lizarraga     Problem List  Principal Problem:    Free intraperitoneal air  Active Problems:    Abdominal pain, right upper quadrant    Essential hypertension    Diabetes mellitus type 2 in obese (HCC)    Asthma    Atrial fibrillation ([facility] [address]  Resolved Problems:    * No resolved hospital problems. *          Assessment/Plan:   1. Free intraperitoneal air - abdomen is soft, non tender and non distended. IV zosyn and general surgery consult   2. Afib - Rate controlled. On coumadin. Pharmacy consult to dose. Continue coreg and digoxin  3. HTN - continue home meds.  In addition she is on diuretics at home (ie aldactone and demadex) which is continued   4. DM II - titrate insulin to keep glucose < 180    NPO  Full code     Admit as inpatient. I anticipate hospitalization spanning at least two midnights for investigation and treatment of the above medically necessary diagnoses.     Maritza Bruner MD    1/14/2019 11:58 PM

## 2019-01-15 NOTE — PROGRESS NOTES
RESPIRATORY THERAPY ASSESSMENT    Name:  Bipin Lemons  Medical Record Number:  [id number]  Age: 68 y.o. Gender: female  : 1945  Today's Date:  1/15/2019  Room:  [address] [place], [place] [id number]    Assessment   Patient Admission Diagnosis      Allergies  Allergies   Allergen Reactions    Lgyvxfxg-Qkeloch-Grepjt [Fluocinolone] Shortness Of Breath    Ciprofloxacin Shortness Of Breath    Diovan [Valsartan] Shortness Of Breath    Flagyl [Metronidazole] Shortness Of Breath     Has taken diflucan at home 12/7/15    Metformin And Related [Metformin And Related] Shortness Of Breath    Benazepril      Other reaction(s): Not Recorded    Morphine      Bad reaction. \"makes her feel horrible\".  Saxagliptin      Other reaction(s): Not Recorded    Levaquin [Levofloxacin] Rash       Minimum Predicted Vital Capacity:     andrew          Actual Vital Capacity:      andrew          Pulmonary History: Asthma   Home Oxygen Therapy:  room air  Home Respiratory Therapy: Albuterol  Current Respiratory Therapy:  HHN Duoneb PRN          Respiratory Severity Index(RSI)   Patients with orders for inhalation medications, oxygen, or any therapeutic treatment modality will be placed on Respiratory Protocol. They will be assessed with the first treatment and at least every 72 hours thereafter. The following severity scale will be used to determine frequency of treatment intervention.     Smoking History: Pulmonary Disease or Smoking History, Greater than 15 pack year = 2    Social History  Social History   Substance Use Topics    Smoking status: Never Smoker    Smokeless tobacco: Never Used    Alcohol use No       Recent Surgical History: None = 0  Past Surgical History  Past Surgical History:   Procedure Laterality Date    BRONCHOSCOPY  2016    Dr. Huong Marinelli - brushings from [address] [place] [address],[facility]-  2018    Dr. Carla Pineda  2017    Dr. Lonnie Peña - sigmoid the bronchospasm improves, the frequency of the bronchodilator can be decreased, based on the patient's RSI, but not less than home treatment regimen frequency. 5. Bronchodilator(s) will be discontinued if patient has a RSI less than 9 and has received no scheduled or as needed treatment for 72  Hrs. Patients Ordered on a Mucolytic Agent:    1. Must always be administered with a bronchodilator. 2. Discontinue if patient experiences worsened bronchospasm, or secretions have lessened to the point that the patient is able to clear them with a cough. Anti-inflammatory and Combination Medications:    1. If the patient lacks prior history of lung disease, is not using inhaled anti-inflammatory medication at home, and lacks wheezing by examination or by history for at least 24 hours, contact physician for possible discontinuation.

## 2019-01-15 NOTE — DISCHARGE INSTR - COC
Continuity of Care Form    Patient Name: Griselda Vázquez   :  1945  MRN:  [id number]    Admit date:  2019  Discharge date:  ***    Code Status Order: Full Code   Advance Directives:   Advance Care Flowsheet Documentation     Date/Time Healthcare Directive Type of Healthcare Directive Copy in [address] Agent's Name Healthcare Agent's Phone Number    [phone]  No, patient does not have an advance directive for healthcare treatment -- -- -- -- --          Admitting Physician:  Hailey Fontana MD  PCP: Suella Ahumada, MD    Discharging Nurse: [facility] Unit/Room#: 1WT-0730/6466-18  Discharging Unit Phone Number: ***    Emergency Contact:   Extended Emergency Contact Information  Primary Emergency Contact: JackelynMurphy  Address: [address] [place] [address] Phone: [phone]  Work Phone: [phone]  Mobile Phone: [phone]  Relation: Spouse  Secondary Emergency Contact: Veena Dawson, [address] Phone: [phone]  Relation: Child    Past Surgical History:  Past Surgical History:   Procedure Laterality Date    BRONCHOSCOPY  2016    Dr. Helena Last - brushings from [address] [place] [address],[place] [address]  2018    Dr. Mera Leslie  2017    Dr. Damian Rico - sigmoid diverticulosis, polypectomies x3    COLONOSCOPY  2014    Dr. Damian Rico - sigmoid diverticulosis, polypectomies x3, internal hemorrhoids    COLONOSCOPY  10/10/2018    w/biopsy performed by Adama Mon MD at [address]  2018    Dr. Berkley Reyna - x3 (LIMA-LAD, L SV-D1-PLV) modified BL MAZE procedure w/obliteration of WAYNE using 45mm AtriClip    HYSTERECTOMY      INSERTABLE CARDIAC MONITOR Left 2018    Dr. Meagan Ghotra [facility]# [id number] [employer]    MITRAL VALVE REPLACEMENT  2018    Dr. Berkley Reyna - Precious Simth Pt had called earlier today and the staff heard the pt say she had a temp of 102 - pt is now clarifying that the highest her temp ever got was 100.9, pt took tylenol and ibuprofen and temp is now 100.1.  Instructed to drink fluids and continue taking her an tissue valve    SKIN BIOPSY      TRANSESOPHAGEAL ECHOCARDIOGRAM  08/21/2018    during CABG/MVR    TUNNELED VENOUS CATHETER PLACEMENT Left 08/23/2018    Dr. Diane Ibarra for HD    UPPER GASTROINTESTINAL ENDOSCOPY N/A 10/10/2018    w/biopsy performed by Linh Fischer MD at [facility]       Immunization History:   Immunization History   Administered Date(s) Administered    Influenza Virus Vaccine 09/26/2012, 12/15/2014, 12/16/2015    Influenza, Bettina Torrez, 3 Years and older, IM (Fluzone 3 yrs and older or Afluria 5 yrs and older) 12/27/2016    Influenza, Bettina Torrez, 6-35 months, IM, PF (Fluzone) 12/18/2018    Pneumococcal 13-valent Conjugate (Winhqex86) 04/15/2015    Pneumococcal Polysaccharide ([id number]) 04/28/2016       Active Problems:  Patient Active Problem List   Diagnosis Code    Long term current use of anticoagulant Z79.01    Mixed hyperlipidemia E78.2    Essential hypertension I10    Generalized osteoarthrosis, involving multiple sites J08.5    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    Paroxysmal SVT (supraventricular tachycardia) ([facility]) I47.1    B12 deficiency E53.8    Supratherapeutic INR R79.1    History of pulmonary embolism Z86.711    Multiple pulmonary nodules R91.8    Diabetes mellitus type 2 in obese ([facility]) E11.69, E66.9    Asthma J45.909    Atrial fibrillation ([facility] [address] I48.91    Hypertension I10    Abnormal electrocardiogram R94.31    Palpitation R00.2    Cardiac arrhythmia I49.9    Unstable angina ([facility]) I20.0    Weight loss counseling, encounter for Z71.3    CAD (coronary artery disease) I25.10    Non-rheumatic mitral regurgitation I34.0    S/P mitral valve replacement with bioprosthetic valve Z95.3    Status post aorto-coronary artery bypass graft Z95.1    S/P Maze operation for atrial fibrillation Z98.890, Z86.79    Goiter E04.9    Primary osteoarthritis of both knees M17.0    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    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    Abdominal pain, right upper quadrant R10.11    Acute colitis K52.9    Infection of superficial incisional surgical site after procedure T81.41XA    Obesity, Class I, BMI 30-34.9 E66.9    Acute encephalopathy G93.40    Acute on chronic combined systolic and diastolic congestive heart failure (HCC) I50.43    Intra-abdominal free air of unknown etiology K66.8    Nausea and vomiting R11.2    Chronic combined systolic (congestive) and diastolic (congestive) heart failure (HCC) I50.42       Isolation/Infection:   Isolation          No Isolation            Nurse Assessment:  Last Vital Signs: /72   Pulse 80   Temp 96.8 °F (36 °C) (Temporal)   Resp 20   Ht 5' 8\" (1.727 m)   Wt 210 lb 9.6 oz (95.5 kg)   SpO2 94%   BMI 32.02 kg/m²     Last documented pain score (0-10 scale): Pain Level: 4  Last Weight:   Wt Readings from Last 1 Encounters:   01/15/19 210 lb 9.6 oz (95.5 kg)     Mental Status:  {IP PT MENTAL STATUS:61337}    IV Access:  { MAREN IV ACCESS:443983813}    Nursing Mobility/ADLs:  Walking   {P DME KLQ}  Transfer  {P DME IUFP:946287767}  Bathing  {P DME VBCQ:365899670}  Dressing  {P DME XUXM:331337066}  Toileting  {P DME EYMY:312192752}  Feeding  {[facility] DME BFBH:936165584}  Med Admin  {[facility] DME XAQV:394695510}  Med Delivery   {[facility] [place] MED Delivery:651558881}    Wound Care Documentation and Therapy:  Incision 18 Sternum Mid;Anterior (Active)   Number of days: 147       Incision 18 Leg (Active)   Number of days: 147        Elimination:  Continence:   · Bowel: {YES / ED:95283}  · Bladder: {YES / JE:48955}  Urinary Catheter: {Urinary Catheter:914611574}   Colostomy/Ileostomy/Ileal Conduit: {YES / }       Date of Last BM: ***    Intake/Output Summary (Last 24 hours) at 01/15/19 1546  Last data filed at 01/15/19 0458   Gross per 24 hour   Intake                0 ml   Output              250 ml   Net             -250 ml     I/O last 3 completed shifts:  In: -   Out: 250 [Urine:250]    Safety Concerns:     [address] ENGEL Safety Concerns:009275319}    Impairments/Disabilities:      [address] MAREN Impairments/Disabilities:555583840}    Nutrition Therapy:  Current Nutrition Therapy:   [address] [facility] Diet List:922117131}    Routes of Feeding: {CHP DME Other Feedings:677078142}  Liquids: {Slp liquid thickness:73438}  Daily Fluid Restriction: {CHP DME Yes amt example:724335861}  Last Modified Barium Swallow with Video (Video Swallowing Test): {Done Not Done UEJF:819584668}    Treatments at the Time of Hospital Discharge:   Respiratory Treatments: ***  Oxygen Therapy:  {Therapy; copd oxygen:05636}  Ventilator:    {[facility] Vent OYFS:395940665}    Rehab Therapies: {THERAPEUTIC INTERVENTION:6941170429}  Weight Bearing Status/Restrictions: [address]  Weight Bearin}  Other Medical Equipment (for information only, NOT a DME order):  {EQUIPMENT:818293054}  Other Treatments: ***    Patient's personal belongings (please select all that are sent with patient):  {CHP DME Belongings:901551308}    RN SIGNATURE:  {Esignature:968995451}    CASE MANAGEMENT/SOCIAL WORK SECTION    Inpatient Status Date: ***    Readmission Risk Assessment Score:  Readmission Risk              Risk of Unplanned Readmission:        34           Discharging to Facility/ Agency   · Name:   · Address:  · Phone:  · Fax:    Dialysis Facility (if applicable)   · Name:  · Address:  · Dialysis Schedule:  · Phone:  · Fax:    / signature: Electronically signed by ELSY De La Paz on 1/15/19 at 3:46 PM    PHYSICIAN SECTION    Prognosis: {Prognosis:1335396254}    Condition at Discharge: [address] Patient Condition:262017481}    Rehab Potential (if transferring to Rehab): {Prognosis:5559048722}    Recommended Labs or Other Treatments After Discharge: ***    Physician Certification: I certify the above information and transfer of Jamie Childers  is necessary for the continuing treatment of the diagnosis listed and that she requires {Admit to Appropriate Level of Care:07844} for {GREATER/LESS:337328843} 30 days.      Update Admission H&P: {CHP DME Changes in TAFYK:286800513}    PHYSICIAN SIGNATURE:  {Esignature:249324395}

## 2019-01-15 NOTE — ED PROVIDER NOTES
[address] [facility]  eMERGENCY dEPARTMENT eNCOUnter      Pt Name: Mireille Enamorado  MRN: [id number]  [place] 1945  Date of evaluation: 2019  Provider: MUNDO Shore  PCP: Chichi Tavares MD  ED Attending: Zhang Patel DO    CHIEF COMPLAINT       Chief Complaint   Patient presents with    Abdominal Pain     abdominal and back pain under R ribs that started four days ago. + N/V. sent by Dr. Garcia Nunes to rule out perforated bowel. HISTORY OF PRESENT ILLNESS   (Location/Symptom, Timing/Onset, Context/Setting, Quality, Duration, Modifying Factors, Severity)  Note limiting factors. Mireille Enamorado is a 68 y.o. female who presents to the emergency department with complaints of Right-sided flank pain that started 4 days ago with nausea and vomiting. Patient's home care physician, Dr. Lillian Mckeon and had a chest x-ray and imaging of the thoracic spine for evaluation. Was reported that there was free air on x-ray and patient was sent to the ER for evaluation. Patient rates her pain 9 out of 10 in severity described as sharp with radiation into her right shoulder. Denies any chest pain or shortness of breath associated with this. Patient's previous abdominal surgeries include cholecystectomy as well as  section. She reports having a possibility of perforated viscus in the past but this did not require surgery. She denies any urinary frequency, urgency, dysuria or hematuria. Denies any bloody or dark tarry stools. Denies medical emesis. No reported fevers. No aggravating or alleviating factors. Nursing Notes were all reviewed and agreed with or any disagreements were addressed  in the HPI. REVIEW OF SYSTEMS    (2-9 systems for level 4, 10 or more for level 5)     Review of Systems   Constitutional: Negative for chills, fatigue and fever. Respiratory: Negative for cough and shortness of breath. Cardiovascular: Negative for chest pain. Medical History:   Diagnosis Date    Asthma     Atrial fibrillation (HCC)     Eosinophilia     Hemoptysis     HIGH CHOLESTEROL     Hx of blood clots     Hypertension     Irregular heart beat     Other specified gastritis without mention of hemorrhage     Palpitations     Skin cancer     basal and squamous    Type II or unspecified type diabetes mellitus without mention of complication, not stated as uncontrolled        SURGICAL HISTORY       Past Surgical History:   Procedure Laterality Date    BRONCHOSCOPY  07/18/2016    Dr. Helena Last - brushings from [address] [place] [address],42-10  08/16/2018    Dr. Mera Leslie  02/07/2017    Dr. Damian Rico - sigmoid diverticulosis, polypectomies x3    COLONOSCOPY  01/17/2014    Dr. Damian Rico - sigmoid diverticulosis, polypectomies x3, internal hemorrhoids    COLONOSCOPY  10/10/2018    w/biopsy performed by Adama Mon MD at [address]  08/21/2018    Dr. Berkley Reyna - x3 (LIMA-LAD, L SV-D1-PLV) modified BL MAZE procedure w/obliteration of WAYNE using 45mm AtriClip    HYSTERECTOMY      INSERTABLE CARDIAC MONITOR Left 08/16/2018    Dr. Meagan Haddad [facility] SN# [id number] [employer]    MITRAL VALVE REPLACEMENT  08/21/2018    Dr. Berkley Reyna - 27mm Medtronic Cinch tissue valve    SKIN BIOPSY      TRANSESOPHAGEAL ECHOCARDIOGRAM  08/21/2018    during CABG/MVR    TUNNELED VENOUS CATHETER PLACEMENT Left 08/23/2018    Dr. Peggy Bryant -  for HD    UPPER GASTROINTESTINAL ENDOSCOPY N/A 10/10/2018    w/biopsy performed by Adama Mon MD at [address]       Previous Medications    ALBUTEROL SULFATE HFA (PROAIR HFA) 108 (90 BASE) MCG/ACT INHALER    Inhale 2 puffs into the lungs every 6 hours as needed for Wheezing    BLOOD GLUCOSE MONITORING SUPPL (FREESTYLE LITE) GAETANO    1 Device by Does not apply route 4 times daily Patient to check blood sugar 4 times a day and PRN, she is treated with multiple daily injections of insulin that require correction dosing ;A1C 8.1 ; ICD code-E11.65 ,Z79.4    BLOOD GLUCOSE TEST STRIPS (FREESTYLE LITE) STRIP    1 each by Does not apply route 4 times daily Patient to check blood sugar 4 times a day and PRN, she is treated with multiple daily injections of insulin that require correction dosing ;A1C 8.1 ; ICD code-E11.65 ,Z79.4    CARVEDILOL (COREG) 3.125 MG TABLET    Take 1 tablet by mouth 2 times daily (with meals)    DIGOXIN (LANOXIN) 125 MCG TABLET    Take 1 tablet by mouth daily    EXENATIDE (BYETTA 10 MCG PEN) 10 MCG/0.04ML INJECTION    Inject 0.04 mLs into the skin 2 times daily (with meals)    FREESTYLE LANCETS MISC    1 each by Does not apply route 4 times daily Patient to check blood sugar 4 times a day and PRN, she is treated with multiple daily injections of insulin that require correction dosing ;A1C 8.1 ; ICD code-E11.65 ,Z79.4    INSULIN GLARGINE (LANTUS) 100 UNIT/ML INJECTION PEN    Inject 45 Units into the skin every morning    MONTELUKAST (SINGULAIR) 10 MG TABLET    TAKE 1 TABLET NIGHTLY    NYSTATIN (MYCOSTATIN) 377298 UNIT/ML SUSPENSION    1 teaspoon 4 times daily x 5 d    OMEPRAZOLE (PRILOSEC) 40 MG DELAYED RELEASE CAPSULE    Take 1 capsule by mouth every morning (before breakfast)    ONDANSETRON (ZOFRAN) 4 MG TABLET    Take 1 tablet by mouth 3 times daily as needed for Nausea or Vomiting    OXYCODONE (ROXICODONE) 5 MG IMMEDIATE RELEASE TABLET    Take 1 tablet by mouth every 6 hours as needed for Pain for up to 30 days. Allayne Lyndsey     POLYETHYLENE GLYCOL (GLYCOLAX) POWDER    FILL DOSING CUP TO MARKED LINE (17 GRAMS) THEN MIX IN LIQUID AND DRINK DAILY    POTASSIUM CHLORIDE (KLOR-CON M) 10 MEQ EXTENDED RELEASE TABLET    Take 1 tablet by mouth daily    ROSUVASTATIN (CRESTOR) 20 MG TABLET    Take 1 tablet by mouth daily    SPIRONOLACTONE (ALDACTONE) 25 MG TABLET    Take 1 tablet by mouth daily    TORSEMIDE (DEMADEX) 20 MG TABLET Take 1 tablet by mouth daily    VITAMIN B-12 500 MCG TABLET    Take 1 tablet by mouth daily    WARFARIN (COUMADIN) 5 MG TABLET    Take 1 tablet by mouth daily Take 7.5 mg M W Fri, 5 mg on other days--inr with Dr nSell Morning; Ciprofloxacin; Diovan [valsartan]; Flagyl [metronidazole]; Metformin and related [metformin and related];  Benazepril; Morphine; Saxagliptin; and Levaquin [levofloxacin]    FAMILY HISTORY       Family History   Problem Relation Age of Onset    Cancer Father     Asthma Mother     Hypertension Mother     Heart Disease Mother     High Blood Pressure Mother         SOCIAL HISTORY       Social History     Social History    Marital status:      Spouse name: N/A    Number of children: N/A    Years of education: N/A     Social History Main Topics    Smoking status: Never Smoker    Smokeless tobacco: Never Used    Alcohol use No    Drug use: No    Sexual activity: Not Asked     Other Topics Concern    None     Social History Narrative    None       SCREENINGS           DIAGNOSTIC RESULTS   LABS:    Labs Reviewed   CBC WITH AUTO DIFFERENTIAL - Abnormal; Notable for the following:        Result Value    WBC 12.9 (*)     Hemoglobin 11.8 (*)     RDW 17.0 (*)     Eosinophils # 2.5 (*)     Anisocytosis 1+ (*)     All other components within normal limits    Narrative:     Performed at:  [facility]  [place]   Phone [phone]   COMPREHENSIVE METABOLIC PANEL W/ REFLEX TO MG FOR LOW K - Abnormal; Notable for the following:     Glucose 175 (*)     CREATININE 1.4 (*)     GFR Non- 37 (*)     GFR African American 45 (*)     Albumin/Globulin Ratio 1.0 (*)     ALT 8 (*)     AST 12 (*)     All other components within normal limits    Narrative:     Performed at:  [facility] [address]  [place], [employer]   Phone [phone]   LIPASE - Abnormal; Notable for the following:     Lipase 78.0 (*)     All other components within normal limits    Narrative:     Performed at:  [facility]  [address]  [place], [place]   Phone [phone]   LACTIC ACID, PLASMA    Narrative:     Performed at:  [facility]  [address]  [place], [address]   Phone [phone]   URINE RT REFLEX TO CULTURE   TYPE AND SCREEN    Narrative:     Performed at:  [facility] [address],  [place], [facility]   Phone [phone]       All other labs were within normal range or not returned as of this dictation. EKG: All EKG's areinterpreted by the Emergency Department Physician who either signs or Co-signs this chart in the absence of a cardiologist.    RADIOLOGY:   Non-plain film images such as CT, Ultrasound and MRI are read by the radiologist. [facility] radiographicimages are visualized and preliminarily interpreted by the  ED Provider with the below findings:    Interpretation per the Radiologist below, if available at the time of this note:    CT ABDOMEN PELVIS WO CONTRAST   Preliminary Result   1. Free intraperitoneal air. Small bowel pneumatosis. Bowel ischemia   perforation should be excluded. In asymptomatic patient, benign pneumatosis   intestinalis is a consideration. Note is made that the patient has   demonstrated pneumatosis and free air on prior examinations. 2. Extensive atherosclerosis. 3. There is a 7.5 cm simple left adnexal cystic lesion, not changed since   10/01/2018. This report was discussed with Dr. Shira Cohen at 9:11 p.m. on 01/14/2019. RECOMMENDATIONS:   Recommend follow-up pelvic MRI with IV contrast or surgical evaluation.       Reference: Yamiel Bircher Radiol 4056;[id number]           Xr Chest Standard (2 Vw)    Result Date: 1/14/2019  EXAMINATION: TWO VIEWS OF THE CHEST; 3 XRAY VIEWS OF THE THORACIC SPINE 1/14/2019 2:25 pm COMPARISON: November 3, 2018. HISTORY: ORDERING SYSTEM PROVIDED HISTORY: Right-sided chest pain TECHNOLOGIST PROVIDED HISTORY: Ordering Physician Provided Reason for Exam: PT states pain in back across right side pain started 3-4 days ago with sharp pain initially now pain is duller but constant and when taking a deep breath or moving certain she gets a jolt of shart pain Acuity: Unknown Type of Exam: Unknown FINDINGS: Chest: Frontal and lateral views of the chest. Normal lung volume. No focal airspace disease. Normal pulmonary vasculature. No pleural effusion or pneumothorax. Stable cardiomediastinal silhouette and great vessels with redemonstration of cardiomegaly. Postsurgical changes of the mediastinum with cardiac implant. Loop recorder device. Lucency under the right and left hemidiaphragms consistent with intraperitoneal free air. Prior median sternotomy. Thoracic Spine: Frontal, lateral and lateral swimmer's views of the thoracic spine. Multilevel mild-to-moderate degenerative disc disease, most pronounced in the midthoracic spine. Mild multilevel anterior vertebral body wedging in the mid to lower thoracic spine is unchanged. No significant listhesis. No abnormal displacement of the paraspinal lines. No obvious destructive osseous lesion. 1.  Lucency under the right and left hemidiaphragms consistent with intraperitoneal free air and could relate to whole of viscus perforation. Additionally, correlation with patient's recent surgical history may be helpful. CT abdomen pelvis may be helpful for further evaluation. 2.  Multilevel mild-to-moderate thoracic spondylosis. 3.  No focal airspace disease. Critical results were called by Dr. Barboza Sessions to Dr. Emily Hinds On 1/14/2019 at 18:04. Xr Thoracic Spine (3 Views)    Result Date: 1/14/2019  EXAMINATION: TWO VIEWS OF THE CHEST; 3 XRAY VIEWS OF THE THORACIC SPINE 1/14/2019 2:25 pm COMPARISON: November 3, 2018.  HISTORY: ORDERING SYSTEM PROVIDED HISTORY: Right-sided chest pain TECHNOLOGIST PROVIDED HISTORY: Ordering Physician Provided Reason for Exam: PT states pain in back across right side pain started 3-4 days ago with sharp pain initially now pain is duller but constant and when taking a deep breath or moving certain she gets a jolt of shart pain Acuity: Unknown Type of Exam: Unknown FINDINGS: Chest: Frontal and lateral views of the chest. Normal lung volume. No focal airspace disease. Normal pulmonary vasculature. No pleural effusion or pneumothorax. Stable cardiomediastinal silhouette and great vessels with redemonstration of cardiomegaly. Postsurgical changes of the mediastinum with cardiac implant. Loop recorder device. Lucency under the right and left hemidiaphragms consistent with intraperitoneal free air. Prior median sternotomy. Thoracic Spine: Frontal, lateral and lateral swimmer's views of the thoracic spine. Multilevel mild-to-moderate degenerative disc disease, most pronounced in the midthoracic spine. Mild multilevel anterior vertebral body wedging in the mid to lower thoracic spine is unchanged. No significant listhesis. No abnormal displacement of the paraspinal lines. No obvious destructive osseous lesion. 1.  Lucency under the right and left hemidiaphragms consistent with intraperitoneal free air and could relate to whole of viscus perforation. Additionally, correlation with patient's recent surgical history may be helpful. CT abdomen pelvis may be helpful for further evaluation. 2.  Multilevel mild-to-moderate thoracic spondylosis. 3.  No focal airspace disease. Critical results were called by Dr. Zenaida Nielson Sessions to Dr. Dorothy Flores On 1/14/2019 at 18:04.        PROCEDURES   Unless otherwisenoted below, none     Procedures    CRITICAL CARE TIME   N/A    CONSULTS:  IP CONSULT TO GENERAL SURGERY  IP CONSULT TO HOSPITALIST    EMERGENCY DEPARTMENT COURSE andDIFFERENTIAL DIAGNOSIS/MDM:   Vitals: blood work. Patient does have an elevated white count today of 12.9. Hemoglobin stable at 11.8. Patient with BUN of 20 and creatinine 1.4. Otherwise normal electrolytes. Lactic acid is 1.7. CT abdomen and pelvis without contrast does show free intraperitoneal air, small bowel the metastasis. Patient has had a demonstration of this pneumatosis and free air on prior examinations back in July. Attending physician did speak with Dr. Gianna Singh who requested broad-spectrum antibiotics, IV Zosyn has been ordered. NPO. We will consult with hospitalist for admission. Patient family agreeable with this plan of care. Otherwise well appearing. The patient tolerated their visit well. They were seen and evaluated by the attending physician, who agreed with the assessment and plan. I have discussed the findings of today's workup with the patient and addressed the patient's questions and concerns. FINAL IMPRESSION      1. Intra-abdominal free air of unknown etiology    2. Pneumatosis intestinalis    3. Acute right-sided thoracic back pain        DISPOSITION/PLAN   DISPOSITION Decision To Admit 01/14/2019 09:11:34 PM      PATIENT REFERRED TO:  No follow-up provider specified.     DISCHARGE MEDICATIONS:  New Prescriptions    No medications on file       DISCONTINUED MEDICATIONS:  Discontinued Medications    No medications on file            (Please note that portions of this note were completed with a voice recognition program.  Efforts were made to edit the dictations but occasionally words aremis-transcribed.)    MUNDO Islas (electronically signed)            MUNDO Fofana  01/14/19 5244

## 2019-01-15 NOTE — PROGRESS NOTES
Nutrition Assessment    Type and Reason for Visit: Initial, Positive Nutrition Screen    Nutrition Recommendations:   1. Continue NPO status per Surgery  2. Advance diet per surgery    Nutrition Assessment: +IP for poor appetite and wt loss. Pt reports poor apetite for the past month d/t nausea. Pt wasn't able to eat much less than 50% of meals. RD reviewed pt's wt in EMR and wt is stable. Pt currently npo for surgery consult d/t free intraperitonal air. Pt denies need of supplement. Pt dislikes them. Pt is nutritionally compromised d/t poor intake over the past month. Malnutrition Assessment:  · Malnutrition Status: At risk for malnutrition  · Context: Chronic illness  · Findings of the 6 clinical characteristics of malnutrition (Minimum of 2 out of 6 clinical characteristics is required to make the diagnosis of moderate or severe Protein Calorie Malnutrition based on AND/ASPEN Guidelines):  1. Energy Intake-Less than or equal to 50% of estimated energy requirement, Greater than or equal to 1 month    2. Weight Loss-No significant weight loss,    3. Fat Loss-No significant subcutaneous fat loss,    4. Muscle Loss-No significant muscle mass loss,    5. Fluid Accumulation-No significant fluid accumulation, Extremities  6.  Strength-Not measured    Nutrition Risk Level: Moderate    Nutrient Needs:  · Estimated Daily Total Kcal: 2264-4349  · Estimated Daily Protein (g): 76-95 gms (1.2-1.5 gms)  · Estimated Daily Total Fluid (ml/day): 5414-7871 (1 ml/kcal)    Nutrition Diagnosis:   · Problem: Inadequate energy intake  · Etiology: related to Insufficient energy/nutrient consumption     Signs and symptoms:  as evidenced by Diet history of poor intake    Objective Information:  · Nutrition-Focused Physical Findings: No edema noted.  I/O's: -250  · Current Nutrition Therapies:  · Oral Diet Orders: NPO   · Oral Diet intake: NPO  · Oral Nutrition Supplement (ONS) Orders: None  · ONS intake: NPO  · Anthropometric Measures:  · Ht: 5' 8\" (172.7 cm)   · Current Body Wt: 210 lb (95.3 kg)  · Ideal Body Wt: 140 lb (63.5 kg), % Ideal Body    · BMI Classification: BMI 30.0 - 34.9 Obese Class I    Nutrition Interventions:   Continue NPO (Advance diet per surgery)  Continued Inpatient Monitoring, Education Not Indicated    Nutrition Evaluation:   · Evaluation: Goals set   · Goals: Diet advanced by F/U per surgery    · Monitoring: Nutrition Progression, Meal Intake, Weight, Pertinent Labs      Electronically signed by Kathy Casey RD, CNSC, LD on 1/15/19 at 1:25 PM    Contact Number: [phone]

## 2019-01-15 NOTE — PROGRESS NOTES
Clinical Pharmacy Note:    Pharmacy consulted to dose warfarin for atrial fibrillation    Target INR: 2.0-3.0  INR on admission: 1.73  Outpatient regimen: 5 mg every day    As patient's INR is sibtherapeutic, a one time 7.5 mg dose is ordered. INR to be drawn daily. Pharmacy will continue to follow and adjust dosing as necessary. Thanks for the consult!   Buster Arias, BrendaD, [facility]

## 2019-01-15 NOTE — PLAN OF CARE
Problem: Nutrition  Goal: Optimal nutrition therapy  Outcome: Ongoing  Nutrition Problem: Inadequate energy intake  Intervention: Food and/or Nutrient Delivery: Continue NPO (Advance diet per surgery)  Nutritional Goals: Diet advanced by F/U per surgery

## 2019-01-15 NOTE — PROGRESS NOTES
Pt admitted from ED, arrived in room around 2300, alert and oriented x4, pleasant. C/O right lower back pain, no nausea. Oriented to room, call light in reach, plan of care reviewed with pt, questions answered, VU. VSS. Assessment completed, see flow charts. No distress noted. evelyne

## 2019-01-15 NOTE — PROGRESS NOTES
awake, alert, cooperative, no apparent distress, and appears stated age  EYES:  Lids and lashes normal, PERRL, EOMI, sclera clear, conjunctiva normal  ENT:  NC/AT, MMM    NECK:  Supple, symmetrical, trachea midline, no adenopathy  HEMATOLOGIC/LYMPHATICS:  no cervical, supraclavicular or axillary lymphadenopathy  LUNGS:  clear to auscultation bilaterally, No increased work of breathing, good air exchange, no crackles or wheezing  CARDIOVASCULAR:  Regular rate and rhythm, normal S1 and S2, no S3 or S4, and no significant murmurs, rubs or gallops noted. Normal apical impulse. ABDOMEN:  Normal active bowel sounds, soft, non-tender, non-distended, no masses palpated, no organomegally  MUSCULOSKELETAL:  Full range of motion noted. NEUROLOGIC:  Awake, alert, oriented to name, place and time. Cranial nerves II-XII are grossly intact. SKIN:  normal skin color, texture, turgor for age.     Data    CBC with Differential:  Lab Results   Component Value Date    WBC 10.0 01/15/2019    HGB 11.4 01/15/2019    HCT 34.3 01/15/2019     01/15/2019    MCV 84.8 01/15/2019    RDW 17.2 01/15/2019    BANDSPCT 1 01/14/2019    LYMPHOPCT 14.1 01/15/2019    MONOPCT 7.5 01/15/2019    BASOPCT 0.1 01/15/2019    MONOSABS 0.7 01/15/2019    LYMPHSABS 1.4 01/15/2019    EOSABS 4.2 01/15/2019    BASOSABS 0.0 01/15/2019     BMP:  Lab Results   Component Value Date     01/14/2019    K 3.9 01/14/2019     01/14/2019    CO2 25 01/14/2019    BUN 20 01/14/2019    CREATININE 1.4 01/14/2019    GLUCOSE 175 01/14/2019    CALCIUM 9.3 01/14/2019    GFRAA 45 01/14/2019    LABGLOM 37 01/14/2019    LABGLOM 45 12/06/2018     LFT: Lab Results   Component Value Date    ALKPHOS 103 01/14/2019    ALT 8 01/14/2019    AST 12 01/14/2019    PROT 7.7 01/14/2019    BILITOT 0.7 01/14/2019    BILIDIR <0.2 08/18/2018    IBILI see below 08/18/2018     Magnesium:    Lab Results   Component Value Date    MG 2.30 11/08/2018     Phosphorus:    Lab Results Component Value Date    PHOS 2.9 10/05/2018     PT/INR:  No results found for: PTINR  U/A:  Lab Results   Component Value Date    LEUKOCYTESUR SMALL 01/15/2019    WBCUA 8 01/15/2019    RBCUA 1 01/15/2019    SPECGRAV 1.022 01/15/2019    UROBILINOGEN 1.0 01/15/2019    BILIRUBINUR Negative 01/15/2019    BLOODU Negative 01/15/2019    GLUCOSEU Negative 01/15/2019    PROTEINU Negative 01/15/2019     ABG:  Lab Results   Component Value Date    PHART 7.512 11/06/2018    PDZ9CUO 34.5 11/06/2018    V1QVYNCT 98.0 11/06/2018    GFV2DAF 27.6 11/06/2018    BEART 4.5 11/06/2018    PO2ART 88.2 11/06/2018    POM3PKC 64.2 11/06/2018           Intake/Output Summary (Last 24 hours) at 01/15/19 1222  Last data filed at 01/15/19 0458   Gross per 24 hour   Intake                0 ml   Output              250 ml   Net             -250 ml       Consults:  IP CONSULT TO GENERAL SURGERY  IP CONSULT TO HOSPITALIST  IP CONSULT TO GENERAL SURGERY  PHARMACY TO DOSE WARFARIN  IP CONSULT TO VASCULAR SURGERY      Active Hospital Problems    Diagnosis Date Noted    Nausea and vomiting [R11.2] 01/15/2019    Chronic combined systolic (congestive) and diastolic (congestive) heart failure ([address] [I50.42] 01/15/2019    Intra-abdominal free air of unknown etiology [K66.8] 01/14/2019    Abdominal pain, right upper quadrant [R10.11]     CAD (coronary artery disease) [I25.10]     Atrial fibrillation ([address] [I48.91]     Asthma [J45.909] 01/12/2018    Diabetes mellitus type 2 in obese ([address] [E11.69, E66.9] 04/27/2017    Essential hypertension [I10] 03/25/2013    Mixed hyperlipidemia [E78.2] 12/19/2012       ASSESSMENT AND PLAN:    Intra-abdominal free air of unknown etiology - await surgery consult. NPO for now    Non-Intractable vomiting with nausea - Will provide symptomatic treatment with Zofran as needed, maintenance of fluids and electrolytes. Abdominal pain, right upper quadrant - now more the right upper back.  Adequately controlled, continue current regimen    CAD (coronary artery disease) - currently stable. Pt denies chest pain. Continue Beta Blocker, Statin and Coumadin    Atrial fibrillation (HCC) - rate controlled; continue Digoxin, Coreg and Coumadin    Chronic combined systolic (congestive) and diastolic (congestive) heart failure (HCC) - A 2D Echocardiogram on 10/03/2018 50%  shows an EF of 50%. On home Torsemide (caution while NPO). Monitor strict I&Os and daily weights. Diabetes mellitus II - Lantus, SSI and when able to eat, start a CCD    Essential (primary) hypertension - continue home meds and monitor blood pressure    Hyperlipidemia - No current evidence of Rhabdomyolysis or other adverse effects. Continue statin therapy while in the hospital    Asthma - stable; Monitor.  Prn Nebs          DVT Prophylaxis: Coumadin continued  Diet: Diet NPO Effective Now Exceptions are: Sips with Meds  Code Status: Full Code    PT/OT Eval Status: Not Ordered    Dispo - Anticipated discharge date unclear    Felicitas Chapin MD

## 2019-01-16 ENCOUNTER — HOSPITAL ENCOUNTER (OUTPATIENT)
Dept: CARDIAC REHAB | Age: 74
Setting detail: THERAPIES SERIES
End: 2019-01-16
Payer: MEDICARE

## 2019-01-16 LAB
A/G RATIO: 0.9 (ref 1.1–2.2)
ALBUMIN SERPL-MCNC: 3.5 G/DL (ref 3.4–5)
ALP BLD-CCNC: 98 U/L (ref 40–129)
ALT SERPL-CCNC: 9 U/L (ref 10–40)
ANION GAP SERPL CALCULATED.3IONS-SCNC: 11 MMOL/L (ref 3–16)
APTT: 34.2 SEC (ref 26–36)
AST SERPL-CCNC: 13 U/L (ref 15–37)
BASOPHILS ABSOLUTE: 0 K/UL (ref 0–0.2)
BASOPHILS RELATIVE PERCENT: 0.2 %
BILIRUB SERPL-MCNC: 0.8 MG/DL (ref 0–1)
BUN BLDV-MCNC: 21 MG/DL (ref 7–20)
CALCIUM SERPL-MCNC: 9.3 MG/DL (ref 8.3–10.6)
CHLORIDE BLD-SCNC: 102 MMOL/L (ref 99–110)
CO2: 26 MMOL/L (ref 21–32)
CREAT SERPL-MCNC: 1.5 MG/DL (ref 0.6–1.2)
EOSINOPHILS ABSOLUTE: 4.5 K/UL (ref 0–0.6)
EOSINOPHILS RELATIVE PERCENT: 44.4 %
GFR AFRICAN AMERICAN: 41
GFR NON-AFRICAN AMERICAN: 34
GLOBULIN: 3.9 G/DL
GLUCOSE BLD-MCNC: 77 MG/DL (ref 70–99)
GLUCOSE BLD-MCNC: 90 MG/DL (ref 70–99)
HCT VFR BLD CALC: 33.4 % (ref 36–48)
HEMATOLOGY PATH CONSULT: NO
HEMOGLOBIN: 10.9 G/DL (ref 12–16)
INR BLD: 1.92 (ref 0.86–1.14)
LACTIC ACID: 1.2 MMOL/L (ref 0.4–2)
LYMPHOCYTES ABSOLUTE: 1.4 K/UL (ref 1–5.1)
LYMPHOCYTES RELATIVE PERCENT: 13.9 %
MAGNESIUM: 2.1 MG/DL (ref 1.8–2.4)
MCH RBC QN AUTO: 28.1 PG (ref 26–34)
MCHC RBC AUTO-ENTMCNC: 32.7 G/DL (ref 31–36)
MCV RBC AUTO: 86 FL (ref 80–100)
MONOCYTES ABSOLUTE: 0.6 K/UL (ref 0–1.3)
MONOCYTES RELATIVE PERCENT: 6.2 %
NEUTROPHILS ABSOLUTE: 3.5 K/UL (ref 1.7–7.7)
NEUTROPHILS RELATIVE PERCENT: 35.3 %
PDW BLD-RTO: 17.1 % (ref 12.4–15.4)
PERFORMED ON: NORMAL
PHOSPHORUS: 4.7 MG/DL (ref 2.5–4.9)
PLATELET # BLD: 182 K/UL (ref 135–450)
PMV BLD AUTO: 8.7 FL (ref 5–10.5)
POTASSIUM SERPL-SCNC: 3.5 MMOL/L (ref 3.5–5.1)
PREALBUMIN: 16.2 MG/DL (ref 20–40)
PROTHROMBIN TIME: 21.9 SEC (ref 9.8–13)
RBC # BLD: 3.89 M/UL (ref 4–5.2)
SODIUM BLD-SCNC: 139 MMOL/L (ref 136–145)
TOTAL PROTEIN: 7.4 G/DL (ref 6.4–8.2)
TRANSFERRIN: 208 MG/DL (ref 200–360)
URINE CULTURE, ROUTINE: NORMAL
WBC # BLD: 10 K/UL (ref 4–11)

## 2019-01-16 PROCEDURE — 83735 ASSAY OF MAGNESIUM: CPT

## 2019-01-16 PROCEDURE — 85610 PROTHROMBIN TIME: CPT

## 2019-01-16 PROCEDURE — 6370000000 HC RX 637 (ALT 250 FOR IP): Performed by: INTERNAL MEDICINE

## 2019-01-16 PROCEDURE — 2500000003 HC RX 250 WO HCPCS: Performed by: INTERNAL MEDICINE

## 2019-01-16 PROCEDURE — 36415 COLL VENOUS BLD VENIPUNCTURE: CPT

## 2019-01-16 PROCEDURE — 80053 COMPREHEN METABOLIC PANEL: CPT

## 2019-01-16 PROCEDURE — 84466 ASSAY OF TRANSFERRIN: CPT

## 2019-01-16 PROCEDURE — 85730 THROMBOPLASTIN TIME PARTIAL: CPT

## 2019-01-16 PROCEDURE — APPSS30 APP SPLIT SHARED TIME 16-30 MINUTES: Performed by: NURSE PRACTITIONER

## 2019-01-16 PROCEDURE — 85025 COMPLETE CBC W/AUTO DIFF WBC: CPT

## 2019-01-16 PROCEDURE — 2580000003 HC RX 258: Performed by: INTERNAL MEDICINE

## 2019-01-16 PROCEDURE — APPNB30 APP NON BILLABLE TIME 0-30 MINS: Performed by: NURSE PRACTITIONER

## 2019-01-16 PROCEDURE — 84100 ASSAY OF PHOSPHORUS: CPT

## 2019-01-16 PROCEDURE — APPSS15 APP SPLIT SHARED TIME 0-15 MINUTES: Performed by: NURSE PRACTITIONER

## 2019-01-16 PROCEDURE — 2060000000 HC ICU INTERMEDIATE R&B

## 2019-01-16 PROCEDURE — 84134 ASSAY OF PREALBUMIN: CPT

## 2019-01-16 PROCEDURE — 83605 ASSAY OF LACTIC ACID: CPT

## 2019-01-16 PROCEDURE — 99232 SBSQ HOSP IP/OBS MODERATE 35: CPT | Performed by: SURGERY

## 2019-01-16 PROCEDURE — 6360000002 HC RX W HCPCS: Performed by: INTERNAL MEDICINE

## 2019-01-16 RX ORDER — ASPIRIN 81 MG/1
81 TABLET, CHEWABLE ORAL DAILY
Status: DISCONTINUED | OUTPATIENT
Start: 2019-01-16 | End: 2019-01-21 | Stop reason: HOSPADM

## 2019-01-16 RX ADMIN — HYDROMORPHONE HYDROCHLORIDE 0.5 MG: 1 INJECTION, SOLUTION INTRAMUSCULAR; INTRAVENOUS; SUBCUTANEOUS at 00:12

## 2019-01-16 RX ADMIN — PANTOPRAZOLE SODIUM 40 MG: 40 TABLET, DELAYED RELEASE ORAL at 07:23

## 2019-01-16 RX ADMIN — TAZOBACTAM SODIUM AND PIPERACILLIN SODIUM 3.38 G: 375; 3 INJECTION, SOLUTION INTRAVENOUS at 04:37

## 2019-01-16 RX ADMIN — ONDANSETRON HYDROCHLORIDE 4 MG: 2 INJECTION, SOLUTION INTRAMUSCULAR; INTRAVENOUS at 22:02

## 2019-01-16 RX ADMIN — HYDROMORPHONE HYDROCHLORIDE 0.5 MG: 1 INJECTION, SOLUTION INTRAMUSCULAR; INTRAVENOUS; SUBCUTANEOUS at 16:11

## 2019-01-16 RX ADMIN — HYDROMORPHONE HYDROCHLORIDE 0.5 MG: 1 INJECTION, SOLUTION INTRAMUSCULAR; INTRAVENOUS; SUBCUTANEOUS at 21:59

## 2019-01-16 RX ADMIN — TORSEMIDE 20 MG: 20 TABLET ORAL at 09:07

## 2019-01-16 RX ADMIN — ROSUVASTATIN CALCIUM 20 MG: 10 TABLET, FILM COATED ORAL at 09:07

## 2019-01-16 RX ADMIN — Medication 10 ML: at 09:08

## 2019-01-16 RX ADMIN — CARVEDILOL 3.12 MG: 3.12 TABLET, FILM COATED ORAL at 09:07

## 2019-01-16 RX ADMIN — TAZOBACTAM SODIUM AND PIPERACILLIN SODIUM 3.38 G: 375; 3 INJECTION, SOLUTION INTRAVENOUS at 21:54

## 2019-01-16 RX ADMIN — TAZOBACTAM SODIUM AND PIPERACILLIN SODIUM 3.38 G: 375; 3 INJECTION, SOLUTION INTRAVENOUS at 14:05

## 2019-01-16 RX ADMIN — ASPIRIN 81 MG 81 MG: 81 TABLET ORAL at 14:08

## 2019-01-16 RX ADMIN — CARVEDILOL 3.12 MG: 3.12 TABLET, FILM COATED ORAL at 16:20

## 2019-01-16 RX ADMIN — SPIRONOLACTONE 25 MG: 25 TABLET ORAL at 09:07

## 2019-01-16 RX ADMIN — DIGOXIN 125 MCG: 125 TABLET ORAL at 09:07

## 2019-01-16 RX ADMIN — MONTELUKAST SODIUM 10 MG: 10 TABLET, FILM COATED ORAL at 21:54

## 2019-01-16 RX ADMIN — HYDROMORPHONE HYDROCHLORIDE 0.5 MG: 1 INJECTION, SOLUTION INTRAMUSCULAR; INTRAVENOUS; SUBCUTANEOUS at 09:10

## 2019-01-16 ASSESSMENT — PAIN SCALES - GENERAL
PAINLEVEL_OUTOF10: 4
PAINLEVEL_OUTOF10: 3
PAINLEVEL_OUTOF10: 4
PAINLEVEL_OUTOF10: 8
PAINLEVEL_OUTOF10: 4
PAINLEVEL_OUTOF10: 9
PAINLEVEL_OUTOF10: 9
PAINLEVEL_OUTOF10: 4
PAINLEVEL_OUTOF10: 1

## 2019-01-16 ASSESSMENT — PAIN DESCRIPTION - DESCRIPTORS
DESCRIPTORS: CONSTANT
DESCRIPTORS: CONSTANT;ACHING

## 2019-01-16 ASSESSMENT — PAIN DESCRIPTION - FREQUENCY
FREQUENCY: CONTINUOUS

## 2019-01-16 ASSESSMENT — PAIN DESCRIPTION - ONSET
ONSET: ON-GOING

## 2019-01-16 ASSESSMENT — PAIN DESCRIPTION - PAIN TYPE
TYPE: CHRONIC PAIN
TYPE: ACUTE PAIN
TYPE: CHRONIC PAIN
TYPE: CHRONIC PAIN

## 2019-01-16 ASSESSMENT — PAIN DESCRIPTION - LOCATION
LOCATION: SHOULDER
LOCATION: SHOULDER;BACK
LOCATION: SHOULDER
LOCATION: BACK

## 2019-01-16 ASSESSMENT — PAIN DESCRIPTION - ORIENTATION
ORIENTATION: RIGHT
ORIENTATION: RIGHT
ORIENTATION: RIGHT;LOWER
ORIENTATION: RIGHT

## 2019-01-16 NOTE — PLAN OF CARE
Problem: Falls - Risk of:  Goal: Will remain free from falls  Will remain free from falls   Outcome: Ongoing      Problem: Nutrition  Goal: Optimal nutrition therapy  Outcome: Ongoing      Problem: HEMODYNAMIC STATUS  Goal: Patient has stable vital signs and fluid balance  Outcome: Ongoing      Comments: Pt in bed with VSS. Pt is NPO for bowel rest & expresses concern with RN re:her nutrition & hydration status. Pt reported that Dr. Manasa Marcial came to see her today and told her that she would remain NPO for bowel rest and no IVF ordered. Pt reports to RN that she will further d/w her doctor tomorrow when he comes to round on her. Will continue to track I/O    Pt is a low fall risk and able to get up to and from bathroom independently without difficulty. Pt remains free from falls, throughout night. Pt encouraged to use call light for needs throughout night; call light is within reach. Bed lock is in lowest position. Will continue to monitor throughout night.

## 2019-01-16 NOTE — PROGRESS NOTES
Patient's morning assessment has been completed.  Patient is alert and oriented and vital signs are stable.  Patient voiced no complaints or concerns at this time.  Medications administered as ordered.  Patient's bed is in the lowest position and call light is within reach.  Will continue to monitor

## 2019-01-16 NOTE — PROGRESS NOTES
Hospitalist   Progress Note    Patient Name: Billy Quezada  PCP: Hira Browning MD  Date of Admission: 1/14/2019    Chief Complaint on Admission: Abdominal and back pain under R ribs that started four days ago. + N/V  Chief diagnosis after evaluation: Free intraperitoneal air     Brief Synopsis: Patient 68 y.o. woman with a history of Hypertension, Hyperlipidemia, Diabetes Mellitus Type II and Coronary Artery Disease who was admitted on 1/14/2019 for treatment following a 4 days h/o abdominal and back pain under R ribs and was found to have free intraperitoneal air     Pt Seen/Examined and Chart Reviewed. Subjective: Pt has no new complaints    Objective: Allergies  Igfounbt-xfwcveb-siocyd [fluocinolone]; Ciprofloxacin; Diovan [valsartan]; Flagyl [metronidazole]; Metformin and related [metformin and related];  Benazepril; Morphine; Saxagliptin; and Levaquin [levofloxacin]    Medications    Scheduled Meds:   aspirin  81 mg Oral Daily    insulin glargine  45 Units Subcutaneous Daily    piperacillin-tazobactam  3.375 g Intravenous Q8H    sodium chloride flush  10 mL Intravenous 2 times per day    montelukast  10 mg Oral Nightly    warfarin  5 mg Oral QPM    rosuvastatin  20 mg Oral Daily    carvedilol  3.125 mg Oral BID WC    digoxin  125 mcg Oral Daily    spironolactone  25 mg Oral Daily    torsemide  20 mg Oral Daily    pantoprazole  40 mg Oral QAM AC    insulin lispro  0-12 Units Subcutaneous TID WC    insulin lispro  0-6 Units Subcutaneous Nightly     Infusions:   dextrose       PRN Meds:  HYDROmorphone, ipratropium-albuterol, sodium chloride flush, magnesium hydroxide, ondansetron, oxyCODONE, glucose, dextrose, glucagon (rDNA), dextrose    Physical    VITALS:  /66   Pulse 79   Temp 97.6 °F (36.4 °C) (Temporal)   Resp 16   Ht 5' 8\" (1.727 m)   Wt 211 lb 4.8 oz (95.8 kg)   SpO2 95%   BMI 32.13 kg/m²   CONSTITUTIONAL:  WD/WN 68y.o. year-old  female who is awake, alert, Results   Component Value Date    PHOS 4.7 01/16/2019     PT/INR:  No results found for: PTINR  U/A:    Lab Results   Component Value Date    LEUKOCYTESUR SMALL 01/15/2019    WBCUA 8 01/15/2019    RBCUA 1 01/15/2019    SPECGRAV 1.022 01/15/2019    UROBILINOGEN 1.0 01/15/2019    BILIRUBINUR Negative 01/15/2019    BLOODU Negative 01/15/2019    GLUCOSEU Negative 01/15/2019    PROTEINU Negative 01/15/2019     ABG:    Lab Results   Component Value Date    PHART 7.512 11/06/2018    RCP3YWR 34.5 11/06/2018    F9PIFSRU 98.0 11/06/2018    NSZ4LPO 27.6 11/06/2018    BEART 4.5 11/06/2018    PO2ART 88.2 11/06/2018    UWR2PNQ 64.2 11/06/2018           Intake/Output Summary (Last 24 hours) at 01/16/19 1226  Last data filed at 01/16/19 0437   Gross per 24 hour   Intake               75 ml   Output              500 ml   Net             -425 ml       Consults:  IP CONSULT TO GENERAL SURGERY  IP CONSULT TO HOSPITALIST  IP CONSULT TO GENERAL SURGERY  PHARMACY TO DOSE WARFARIN  IP CONSULT TO VASCULAR SURGERY      Active Hospital Problems    Diagnosis Date Noted    Nausea and vomiting [R11.2] 01/15/2019    Chronic combined systolic (congestive) and diastolic (congestive) heart failure ([facility] [address] [I50.42] 01/15/2019    Intra-abdominal free air of unknown etiology [K66.8] 01/14/2019    Abdominal pain, right upper quadrant [R10.11]     CAD (coronary artery disease) [I25.10]     Atrial fibrillation ([facility] [address] [I48.91]     Asthma [J45.909] 01/12/2018    Diabetes mellitus type 2 in obese ([facility] [address] [E11.69, E66.9] 04/27/2017    Essential hypertension [I10] 03/25/2013    Mixed hyperlipidemia [E78.2] 12/19/2012       ASSESSMENT AND PLAN:    Intra-abdominal free air of unknown etiology - Surgery consult appreciated. Work up in progress. Continue NPO for now    Non-Intractable vomiting with nausea - None today; continue to provide symptomatic treatment with Zofran as needed, maintenance of fluids and electrolytes.     Right rib and back pain, right

## 2019-01-16 NOTE — CONSULTS
Crestor, Byetta, Klor-Con, Aldactone,  Demadex, Lanoxin, vitamin B12, Coreg, Singulair, GlycoLax and  Mycostatin. SOCIAL HISTORY:  The patient does not smoke or take alcohol. REVIEW OF SYSTEMS:  Positive for irregular heartbeat, skin cancer,  shortness of breath. All other systems were reviewed and are  unremarkable. FAMILY HISTORY:  Positive for asthma, hypertension, heart disease, high  blood pressure and cancer. PHYSICAL EXAMINATION:  GENERAL:  The patient is alert, oriented, in no apparent distress. Mood  and affect are appropriate. SKIN:  Normal to inspection and palpation. NEUROLOGIC:  Cranial nerves II through XII intact. Normal sensation. HEENT:  Conjunctivae pink. Sclerae nonicteric. External ears are  normal.  Hearing is normal.  NECK:  Supple. There is no thyromegaly. There is no cervical,  supraclavicular or axillary adenopathy. CARDIOVASCULAR:  Regular rate and rhythm. Normal palpation. LUNGS:  Clear to auscultation bilaterally. Normal respiratory effort. ABDOMEN:  Soft, nontender, nondistended. There are no apparent hernias. LABORATORY DATA:  White blood count from yesterday evening was 10.0. Liver functions were unremarkable. Her lipase is mildly elevated at 78. Urinalysis shows negative nitrites, small leuko esterase, 2 white blood  cells. ASSESSMENT AND PLAN:  The patient is a 78-year-old female. She  presented to the hospital after an outpatient x-ray showed free  intraperitoneal air. She has had a 5 day history of complaints of right  subscapular, right shoulder and neck pain associated with nausea and  vomiting. She has had no complaints of abdominal pain. She has a past  history of similar symptoms in 07/2018. CAT scan on that occasion and  the current admission both showed pneumatosis intestinalis of the small  bowel as well as free intraperitoneal air. She was treated  conservatively in July and recovered uneventfully.   We will again treat  her conservatively with IV fluids, bowel rest and broad spectrum  antibiotics. I suspect that she will again recover uneventfully. The bigger question is the etiology of the recurrent pneumatosis and  free intraperitoneal air. There are some calcifications at the origin  of the celiac and SMA although they did not appear to be extensive in  nature and it is doubted that there is a significant stenosis. We will  plan to review her old records and possibly obtain a formal consult from  Vascular Surgery. We will continue to follow along with you. Bere Nieto MD    D: 01/15/2019 10:53:55       T: 01/16/2019 5:32:50     [id number]  Job#: [id number]     Doc#: [id number]    CC:   Yolanda Pitts MD

## 2019-01-16 NOTE — PROGRESS NOTES
Vascular Progress Note    1/16/2019 11:05 AM    Chief complaint / Reason for visit : mesenteric calcifications     Subjective:  Patient resting in bed. She is complaining of right sided back and shoulder pain. Denies any nausea or vomiting. Has been NPO. Appears hemodynamically stable.      Vital Signs: /67   Pulse 82   Temp 97.9 °F (36.6 °C) (Oral)   Resp 16   Ht 5' 8\" (1.727 m)   Wt 211 lb 4.8 oz (95.8 kg)   SpO2 92%   BMI 32.13 kg/m²      I/O:      Intake/Output Summary (Last 24 hours) at 01/16/19 1105  Last data filed at 01/16/19 0437   Gross per 24 hour   Intake               75 ml   Output              500 ml   Net             -425 ml       Physical Exam:   Respiratory: clear to auscultation bilaterally  Heart[de-identified]  normal sinus rhythm   Abd:  abdomen is soft without significant tenderness, masses, organomegaly or guarding, + bowel sounds  Vascular:  radial pulses normal  Skin:  normal exam; no erythema, swelling or tenderness    Labs:   Lab Results   Component Value Date     01/16/2019    K 3.5 01/16/2019    K 3.9 01/14/2019     01/16/2019    CO2 26 01/16/2019    BUN 21 01/16/2019    CREATININE 1.5 01/16/2019    GFRAA 41 01/16/2019    LABGLOM 34 01/16/2019    LABGLOM 45 12/06/2018    GLUCOSE 77 01/16/2019    PHOS 4.7 01/16/2019    MG 2.10 01/16/2019    CALCIUM 9.3 01/16/2019     Lab Results   Component Value Date    WBC 10.0 01/16/2019    RBC 3.89 01/16/2019    HGB 10.9 01/16/2019    HCT 33.4 01/16/2019    MCV 86.0 01/16/2019    RDW 17.1 01/16/2019     01/16/2019     Lab Results   Component Value Date    INR 1.92 (H) 01/16/2019    PROTIME 21.9 (H) 01/16/2019          Scheduled Meds:    insulin glargine  45 Units Subcutaneous Daily    piperacillin-tazobactam  3.375 g Intravenous Q8H    sodium chloride flush  10 mL Intravenous 2 times per day    montelukast  10 mg Oral Nightly    warfarin  5 mg Oral QPM    rosuvastatin  20 mg Oral Daily    carvedilol  3.125 mg Oral BID WC

## 2019-01-16 NOTE — PROGRESS NOTES
01/16/19 0456   AST  12*  13*   ALT  8*  9*   BILITOT  0.7  0.8   ALKPHOS  103  98     Amylase: No results for input(s): AMYLASE in the last 72 hours. Lipase:   Recent Labs      01/14/19 1927   LIPASE  78.0*     Mag:      Recent Labs      01/16/19 0456   MG  2.10      Phos:     Recent Labs      01/16/19 0456   PHOS  4.7      Coags:   Recent Labs      01/14/19   1922  01/15/19   0510  01/16/19 0456   INR  1.73*  1.70*  1.92*   APTT   --    --   34.2       Cultures:  Anaerobic culture  No results for input(s): Nicholette Drew in the last 72 hours. Blood culture  No results for input(s): BC in the last 72 hours. Blood culture 2  No results for input(s): BLOODCULT2 in the last 72 hours. Body fluid culture  No results for input(s): BLOODCULT2 in the last 72 hours. Surgical culture  No results for input(s): CXSURG in the last 72 hours. Fecal occult  No results for input(s): OCCULTBLDFEC in the last 72 hours. Gram stain  No results for input(s): LABGRAM in the last 72 hours. Stool culture 1  No results for input(s): CXST in the last 72 hours. Stool culture 2  No results for input(s): STOOLCULT2 in the last 72 hours. Urine culture  Recent Labs      01/15/19   0445   LABURIN  No growth at 18-36 hours       Wound abscess  No results for input(s): WNDABS in the last 72 hours. Pathology:  NA    Imaging:  I have personally reviewed the following films:    Ct Abdomen Pelvis Wo Contrast    Result Date: 1/16/2019  EXAMINATION: CT OF THE ABDOMEN AND PELVIS WITHOUT CONTRAST 1/14/2019 8:30 pm TECHNIQUE: CT of the abdomen and pelvis was performed without the administration of intravenous contrast. Multiplanar reformatted images are provided for review. Dose modulation, iterative reconstruction, and/or weight based adjustment of the mA/kV was utilized to reduce the radiation dose to as low as reasonably achievable.  COMPARISON: 10/01/2018 HISTORY: ORDERING SYSTEM PROVIDED HISTORY: possible bowel perf TECHNOLOGIST PROVIDED HISTORY: Ordering Physician Provided Reason for Exam: possible bowel perf Acuity: Unknown Type of Exam: Unknown FINDINGS: Lower Chest: The heart size is enlarged. There is extensive coronary calcification. The patient is status post sternotomy. There is a small hiatal hernia. There are numerous noncalcified pulmonary nodules at the lung bases, the largest measuring 4 mm. These appears similar to the prior exam. Organs: No focal hepatic abnormality is identified. The adrenal glands have been removed. Splenic calcifications are noted. No focal pancreatic abnormality is identified. The adrenal glands are within normal limits. The kidneys are not obstructed. There is no urinary tract calcification. GI/Bowel: The bowel is not obstructed. The appendix is within normal limits. Diverticulosis is present. There is no evidence of diverticulitis. Duodenal diverticula are noted. Pelvis: There is no free fluid in the pelvis. There is a large left adnexal cyst measuring 7.5 cm, unchanged. The urinary bladder is unremarkable. Peritoneum/Retroperitoneum: Multifocal free intraperitoneal air is identified. Pneumatosis is noted involving small bowel loops in the left abdomen. Pneumatosis was noted on the prior exam. Atherosclerosis is noted with extensive splenic arterial calcifications. Bones/Soft Tissues: Degenerative changes noted throughout the spine. There is no acute osseous abnormality. 1. Free intraperitoneal air. Small bowel pneumatosis. Bowel ischemia and perforation should be excluded. In asymptomatic patient, benign pneumatosis intestinalis is a consideration. Note is made that the patient has demonstrated pneumatosis and free air on prior examinations. 2. Extensive atherosclerosis. 3. There is a 7.5 cm simple left adnexal cystic lesion, not changed since 10/01/2018. Please see below for management recommendations.  This report was discussed with Dr. Lenin Cho at 9:11 p.m. on results were called by Dr. Addy Rodriguez Sessions to Dr. Sanchez Martino On 1/14/2019 at 18:04. Xr Thoracic Spine (3 Views)    Result Date: 1/14/2019  EXAMINATION: TWO VIEWS OF THE CHEST; 3 XRAY VIEWS OF THE THORACIC SPINE 1/14/2019 2:25 pm COMPARISON: November 3, 2018. HISTORY: ORDERING SYSTEM PROVIDED HISTORY: Right-sided chest pain TECHNOLOGIST PROVIDED HISTORY: Ordering Physician Provided Reason for Exam: PT states pain in back across right side pain started 3-4 days ago with sharp pain initially now pain is duller but constant and when taking a deep breath or moving certain she gets a jolt of shart pain Acuity: Unknown Type of Exam: Unknown FINDINGS: Chest: Frontal and lateral views of the chest. Normal lung volume. No focal airspace disease. Normal pulmonary vasculature. No pleural effusion or pneumothorax. Stable cardiomediastinal silhouette and great vessels with redemonstration of cardiomegaly. Postsurgical changes of the mediastinum with cardiac implant. Loop recorder device. Lucency under the right and left hemidiaphragms consistent with intraperitoneal free air. Prior median sternotomy. Thoracic Spine: Frontal, lateral and lateral swimmer's views of the thoracic spine. Multilevel mild-to-moderate degenerative disc disease, most pronounced in the midthoracic spine. Mild multilevel anterior vertebral body wedging in the mid to lower thoracic spine is unchanged. No significant listhesis. No abnormal displacement of the paraspinal lines. No obvious destructive osseous lesion. 1.  Lucency under the right and left hemidiaphragms consistent with intraperitoneal free air and could relate to whole of viscus perforation. Additionally, correlation with patient's recent surgical history may be helpful. CT abdomen pelvis may be helpful for further evaluation. 2.  Multilevel mild-to-moderate thoracic spondylosis. 3.  No focal airspace disease.  Critical results were called by Dr. Addy Rodriguez Sessions to  Imelda Humphreys On 1/14/2019 at 18:04. Scheduled Meds:   aspirin  81 mg Oral Daily    insulin glargine  45 Units Subcutaneous Daily    piperacillin-tazobactam  3.375 g Intravenous Q8H    sodium chloride flush  10 mL Intravenous 2 times per day    montelukast  10 mg Oral Nightly    warfarin  5 mg Oral QPM    rosuvastatin  20 mg Oral Daily    carvedilol  3.125 mg Oral BID WC    digoxin  125 mcg Oral Daily    spironolactone  25 mg Oral Daily    torsemide  20 mg Oral Daily    pantoprazole  40 mg Oral QAM AC    insulin lispro  0-12 Units Subcutaneous TID WC    insulin lispro  0-6 Units Subcutaneous Nightly     Continuous Infusions:   dextrose       PRN Meds:. HYDROmorphone, ipratropium-albuterol, sodium chloride flush, magnesium hydroxide, ondansetron, oxyCODONE, glucose, dextrose, glucagon (rDNA), dextrose      Assessment:  68 y.o. female admitted with   1. Intra-abdominal free air of unknown etiology    2. Pneumatosis intestinalis    3. Acute right-sided thoracic back pain        Pneumoperitoneum  Pneumatosis intestinalis, small bowel  Arteriolosclerosis  Atrial fibrillation on Coumadin  Chronic kidney disease  CAD, s/p CABG      Plan:  1. No plans for surgical intervention at this time; abdominal exam remains benign, continue close monitoring, will likely repeat CT imaging tomorrow to follow up on free air/pneumatosis  2. NPO diet with sips  3. IV hydration  4. Antibiotics  5. Activity as tolerated  6. Pulmonary toilet, incentive spirometry  7. PRN analgesics and antiemetics--minimizing narcotics as tolerated  8. DVT prophylaxis defer to primary team; would hold Coumadin at this time in case the need for surgery should arrise, would recommend shorter acting agents, Heparin if bridging is necessary  9. Management of medical comorbid etiologies per primary team and consulting services    EDUCATION:  Educated patient on plan of care and disease process--all questions answered.     Plans discussed with patient and nursing. Reviewed and discussed with Dr. Caren Parks. Signed:  Beata Barber, APRN - CNP  1/16/2019 2:45 PM     I have personally performed a face to face diagnostic evaluation on this patient. I have interviewed and examined the patient and I agree with the assessment above. In summary, my findings and plan are the following:   Ms. Tiarra Saavedra is a 68 y.o. female who presents with   Pneumoperitoneum  Pneumatosis intestinalis, small bowel  Arteriolosclerosis  Atrial fibrillation on Coumadin  Chronic kidney disease  CAD, s/p CABG    Plan:  1. Abdomen benign, does not need emergent exploration unless condition deteriorates. Anticipate re-imaging prior to starting feeds though as patient does complain of back pain only, no abdominal pain  2. NPO  3. IVF  4. Antibiotics  5. Pain medication and antiemetics as needed with caution as may mask worsening exam  6. Recommend holding coumadin, if anticoagulation bridge necessary recommend heparin secondary to short half life in preparation for possible intervention if necessary    Tushar Parks MD, FACS  1/16/2019  4:38 PM

## 2019-01-17 ENCOUNTER — APPOINTMENT (OUTPATIENT)
Dept: CT IMAGING | Age: 74
DRG: 395 | End: 2019-01-17
Payer: MEDICARE

## 2019-01-17 LAB
ANION GAP SERPL CALCULATED.3IONS-SCNC: 12 MMOL/L (ref 3–16)
BASOPHILS ABSOLUTE: 0 K/UL (ref 0–0.2)
BASOPHILS RELATIVE PERCENT: 0.2 %
BUN BLDV-MCNC: 22 MG/DL (ref 7–20)
CALCIUM SERPL-MCNC: 9.2 MG/DL (ref 8.3–10.6)
CHLORIDE BLD-SCNC: 103 MMOL/L (ref 99–110)
CO2: 27 MMOL/L (ref 21–32)
CREAT SERPL-MCNC: 1.6 MG/DL (ref 0.6–1.2)
EOSINOPHILS ABSOLUTE: 4.2 K/UL (ref 0–0.6)
EOSINOPHILS RELATIVE PERCENT: 43.1 %
GFR AFRICAN AMERICAN: 38
GFR NON-AFRICAN AMERICAN: 32
GLUCOSE BLD-MCNC: 101 MG/DL (ref 70–99)
GLUCOSE BLD-MCNC: 109 MG/DL (ref 70–99)
GLUCOSE BLD-MCNC: 110 MG/DL (ref 70–99)
GLUCOSE BLD-MCNC: 128 MG/DL (ref 70–99)
GLUCOSE BLD-MCNC: 155 MG/DL (ref 70–99)
GLUCOSE BLD-MCNC: 85 MG/DL (ref 70–99)
GLUCOSE BLD-MCNC: 85 MG/DL (ref 70–99)
GLUCOSE BLD-MCNC: 87 MG/DL (ref 70–99)
GLUCOSE BLD-MCNC: 93 MG/DL (ref 70–99)
HCT VFR BLD CALC: 32.2 % (ref 36–48)
HEMOGLOBIN: 10.6 G/DL (ref 12–16)
INR BLD: 2.04 (ref 0.86–1.14)
LACTIC ACID: 0.8 MMOL/L (ref 0.4–2)
LYMPHOCYTES ABSOLUTE: 1.2 K/UL (ref 1–5.1)
LYMPHOCYTES RELATIVE PERCENT: 12.3 %
MAGNESIUM: 2.1 MG/DL (ref 1.8–2.4)
MCH RBC QN AUTO: 27.6 PG (ref 26–34)
MCHC RBC AUTO-ENTMCNC: 32.8 G/DL (ref 31–36)
MCV RBC AUTO: 84 FL (ref 80–100)
MONOCYTES ABSOLUTE: 0.6 K/UL (ref 0–1.3)
MONOCYTES RELATIVE PERCENT: 6.4 %
NEUTROPHILS ABSOLUTE: 3.7 K/UL (ref 1.7–7.7)
NEUTROPHILS RELATIVE PERCENT: 38 %
PDW BLD-RTO: 16.9 % (ref 12.4–15.4)
PERFORMED ON: ABNORMAL
PERFORMED ON: NORMAL
PLATELET # BLD: 185 K/UL (ref 135–450)
PMV BLD AUTO: 8.1 FL (ref 5–10.5)
POTASSIUM REFLEX MAGNESIUM: 3.5 MMOL/L (ref 3.5–5.1)
PROTHROMBIN TIME: 23.3 SEC (ref 9.8–13)
RBC # BLD: 3.84 M/UL (ref 4–5.2)
SODIUM BLD-SCNC: 142 MMOL/L (ref 136–145)
WBC # BLD: 9.7 K/UL (ref 4–11)

## 2019-01-17 PROCEDURE — 85025 COMPLETE CBC W/AUTO DIFF WBC: CPT

## 2019-01-17 PROCEDURE — 74176 CT ABD & PELVIS W/O CONTRAST: CPT

## 2019-01-17 PROCEDURE — 2580000003 HC RX 258

## 2019-01-17 PROCEDURE — 2580000003 HC RX 258: Performed by: INTERNAL MEDICINE

## 2019-01-17 PROCEDURE — 6360000004 HC RX CONTRAST MEDICATION: Performed by: SURGERY

## 2019-01-17 PROCEDURE — 36415 COLL VENOUS BLD VENIPUNCTURE: CPT

## 2019-01-17 PROCEDURE — APPSS15 APP SPLIT SHARED TIME 0-15 MINUTES: Performed by: NURSE PRACTITIONER

## 2019-01-17 PROCEDURE — 85610 PROTHROMBIN TIME: CPT

## 2019-01-17 PROCEDURE — 83735 ASSAY OF MAGNESIUM: CPT

## 2019-01-17 PROCEDURE — 6360000002 HC RX W HCPCS: Performed by: INTERNAL MEDICINE

## 2019-01-17 PROCEDURE — 83605 ASSAY OF LACTIC ACID: CPT

## 2019-01-17 PROCEDURE — 2060000000 HC ICU INTERMEDIATE R&B

## 2019-01-17 PROCEDURE — 99232 SBSQ HOSP IP/OBS MODERATE 35: CPT | Performed by: SURGERY

## 2019-01-17 PROCEDURE — APPNB30 APP NON BILLABLE TIME 0-30 MINS: Performed by: NURSE PRACTITIONER

## 2019-01-17 PROCEDURE — 6370000000 HC RX 637 (ALT 250 FOR IP): Performed by: INTERNAL MEDICINE

## 2019-01-17 PROCEDURE — 2500000003 HC RX 250 WO HCPCS: Performed by: INTERNAL MEDICINE

## 2019-01-17 PROCEDURE — 80048 BASIC METABOLIC PNL TOTAL CA: CPT

## 2019-01-17 RX ORDER — SODIUM CHLORIDE 9 MG/ML
INJECTION, SOLUTION INTRAVENOUS
Status: COMPLETED
Start: 2019-01-17 | End: 2019-01-17

## 2019-01-17 RX ORDER — SODIUM CHLORIDE 9 MG/ML
INJECTION, SOLUTION INTRAVENOUS CONTINUOUS
Status: DISCONTINUED | OUTPATIENT
Start: 2019-01-17 | End: 2019-01-20

## 2019-01-17 RX ADMIN — ROSUVASTATIN CALCIUM 20 MG: 10 TABLET, FILM COATED ORAL at 11:17

## 2019-01-17 RX ADMIN — TAZOBACTAM SODIUM AND PIPERACILLIN SODIUM 3.38 G: 375; 3 INJECTION, SOLUTION INTRAVENOUS at 05:37

## 2019-01-17 RX ADMIN — IOHEXOL 50 ML: 240 INJECTION, SOLUTION INTRATHECAL; INTRAVASCULAR; INTRAVENOUS; ORAL at 08:02

## 2019-01-17 RX ADMIN — DIGOXIN 125 MCG: 125 TABLET ORAL at 11:16

## 2019-01-17 RX ADMIN — HYDROMORPHONE HYDROCHLORIDE 0.5 MG: 1 INJECTION, SOLUTION INTRAMUSCULAR; INTRAVENOUS; SUBCUTANEOUS at 10:06

## 2019-01-17 RX ADMIN — SPIRONOLACTONE 25 MG: 25 TABLET ORAL at 11:16

## 2019-01-17 RX ADMIN — TAZOBACTAM SODIUM AND PIPERACILLIN SODIUM 3.38 G: 375; 3 INJECTION, SOLUTION INTRAVENOUS at 14:14

## 2019-01-17 RX ADMIN — CARVEDILOL 3.12 MG: 3.12 TABLET, FILM COATED ORAL at 11:16

## 2019-01-17 RX ADMIN — MONTELUKAST SODIUM 10 MG: 10 TABLET, FILM COATED ORAL at 20:27

## 2019-01-17 RX ADMIN — ASPIRIN 81 MG 81 MG: 81 TABLET ORAL at 11:17

## 2019-01-17 RX ADMIN — TORSEMIDE 20 MG: 20 TABLET ORAL at 11:16

## 2019-01-17 RX ADMIN — SODIUM CHLORIDE 250 ML: 9 INJECTION, SOLUTION INTRAVENOUS at 22:20

## 2019-01-17 RX ADMIN — ONDANSETRON HYDROCHLORIDE 4 MG: 2 INJECTION, SOLUTION INTRAMUSCULAR; INTRAVENOUS at 12:42

## 2019-01-17 RX ADMIN — ONDANSETRON HYDROCHLORIDE 4 MG: 2 INJECTION, SOLUTION INTRAMUSCULAR; INTRAVENOUS at 20:27

## 2019-01-17 RX ADMIN — PANTOPRAZOLE SODIUM 40 MG: 40 TABLET, DELAYED RELEASE ORAL at 05:36

## 2019-01-17 RX ADMIN — TAZOBACTAM SODIUM AND PIPERACILLIN SODIUM 3.38 G: 375; 3 INJECTION, SOLUTION INTRAVENOUS at 22:20

## 2019-01-17 RX ADMIN — SODIUM CHLORIDE: 9 INJECTION, SOLUTION INTRAVENOUS at 12:37

## 2019-01-17 RX ADMIN — Medication 10 ML: at 20:27

## 2019-01-17 ASSESSMENT — PAIN DESCRIPTION - PAIN TYPE
TYPE: ACUTE PAIN

## 2019-01-17 ASSESSMENT — PAIN DESCRIPTION - LOCATION
LOCATION: BACK;SHOULDER
LOCATION: BACK
LOCATION: SHOULDER;BACK

## 2019-01-17 ASSESSMENT — PAIN DESCRIPTION - ONSET: ONSET: ON-GOING

## 2019-01-17 ASSESSMENT — PAIN SCALES - GENERAL
PAINLEVEL_OUTOF10: 5
PAINLEVEL_OUTOF10: 3
PAINLEVEL_OUTOF10: 3
PAINLEVEL_OUTOF10: 6
PAINLEVEL_OUTOF10: 2

## 2019-01-17 ASSESSMENT — PAIN DESCRIPTION - ORIENTATION
ORIENTATION: RIGHT

## 2019-01-17 ASSESSMENT — PAIN DESCRIPTION - DIRECTION: RADIATING_TOWARDS: LOWER

## 2019-01-17 ASSESSMENT — PAIN DESCRIPTION - PROGRESSION: CLINICAL_PROGRESSION: NOT CHANGED

## 2019-01-17 ASSESSMENT — PAIN DESCRIPTION - DESCRIPTORS: DESCRIPTORS: CONSTANT

## 2019-01-17 ASSESSMENT — PAIN DESCRIPTION - FREQUENCY: FREQUENCY: CONTINUOUS

## 2019-01-17 NOTE — PROGRESS NOTES
Hospitalist   Progress Note    Patient Name: Joe Hutson  PCP: Jessa Schmitz MD  Date of Admission: 1/14/2019    Chief Complaint on Admission: Abdominal and back pain under R ribs that started four days ago. + N/V  Chief diagnosis after evaluation: Free intraperitoneal air     Brief Synopsis: Patient 68 y.o. woman with a history of Hypertension, Hyperlipidemia, Diabetes Mellitus Type II and Coronary Artery Disease who was admitted on 1/14/2019 for treatment following a 4 days h/o abdominal and back pain under R ribs and was found to have free intraperitoneal air     Pt Seen/Examined and Chart Reviewed. Subjective:  Diffuse back pain is somewhat better. Just returned from CT abdomen. Had a bowel movement this morning. No new symptoms. Objective: Allergies  Daglkjek-oiudhbv-kdtmko [fluocinolone]; Ciprofloxacin; Diovan [valsartan]; Flagyl [metronidazole]; Metformin and related [metformin and related];  Benazepril; Morphine; Saxagliptin; and Levaquin [levofloxacin]    Medications    Scheduled Meds:   warfarin (COUMADIN) daily dosing (placeholder)   Other RX Placeholder    [START ON 1/18/2019] insulin glargine  40 Units Subcutaneous Daily    aspirin  81 mg Oral Daily    piperacillin-tazobactam  3.375 g Intravenous Q8H    sodium chloride flush  10 mL Intravenous 2 times per day    montelukast  10 mg Oral Nightly    rosuvastatin  20 mg Oral Daily    carvedilol  3.125 mg Oral BID WC    digoxin  125 mcg Oral Daily    spironolactone  25 mg Oral Daily    torsemide  20 mg Oral Daily    pantoprazole  40 mg Oral QAM AC    insulin lispro  0-12 Units Subcutaneous TID WC    insulin lispro  0-6 Units Subcutaneous Nightly     Infusions:   sodium chloride 75 mL/hr at 01/17/19 1237    dextrose       PRN Meds:  HYDROmorphone, ipratropium-albuterol, sodium chloride flush, magnesium hydroxide, ondansetron, oxyCODONE, glucose, dextrose, glucagon (rDNA), dextrose    Physical    VITALS:  /79   Pulse 85 Temp 97 °F (36.1 °C) (Temporal)   Resp 16   Ht 5' 8\" (1.727 m)   Wt 204 lb 9.6 oz (92.8 kg)   SpO2 95%   BMI 31.11 kg/m²   CONSTITUTIONAL:  WD/WN 68y.o. year-old  female who is awake, alert, cooperative, no apparent distress, and appears stated age  EYES:  Lids and lashes normal, PERRL, EOMI, sclera clear, conjunctiva normal  ENT:  NC/AT, MMM    NECK:  Supple, symmetrical, trachea midline, no adenopathy  HEMATOLOGIC/LYMPHATICS:  no cervical, supraclavicular or axillary lymphadenopathy  LUNGS:  clear to auscultation bilaterally, No increased work of breathing, good air exchange, no crackles or wheezing  CARDIOVASCULAR:  Regular rate and rhythm, normal S1 and S2, no S3 or S4, and no significant murmurs, rubs or gallops noted. Normal apical impulse. ABDOMEN:  Normal active bowel sounds, soft, non-tender, non-distended, no masses palpated, no organomegally  MUSCULOSKELETAL:  Full range of motion noted. NEUROLOGIC:  Awake, alert, oriented to name, place and time. Cranial nerves II-XII are grossly intact. SKIN:  normal skin color, texture, turgor for age.     Data    CBC with Differential:    Lab Results   Component Value Date    WBC 9.7 01/17/2019    HGB 10.6 01/17/2019    HCT 32.2 01/17/2019     01/17/2019    MCV 84.0 01/17/2019    RDW 16.9 01/17/2019    BANDSPCT 1 01/14/2019    LYMPHOPCT 12.3 01/17/2019    MONOPCT 6.4 01/17/2019    BASOPCT 0.2 01/17/2019    MONOSABS 0.6 01/17/2019    LYMPHSABS 1.2 01/17/2019    EOSABS 4.2 01/17/2019    BASOSABS 0.0 01/17/2019     BMP:    Lab Results   Component Value Date     01/17/2019    K 3.5 01/17/2019     01/17/2019    CO2 27 01/17/2019    BUN 22 01/17/2019    CREATININE 1.6 01/17/2019    GLUCOSE 85 01/17/2019    CALCIUM 9.2 01/17/2019    GFRAA 38 01/17/2019    LABGLOM 32 01/17/2019    LABGLOM 45 12/06/2018     LFT:   Lab Results   Component Value Date    ALKPHOS 98 01/16/2019    ALT 9 01/16/2019    AST 13 01/16/2019    PROT 7.4 01/16/2019    BILITOT 0.8 01/16/2019    BILIDIR <0.2 08/18/2018    IBILI see below 08/18/2018     Magnesium:    Lab Results   Component Value Date    MG 2.10 01/17/2019     Phosphorus:    Lab Results   Component Value Date    PHOS 4.7 01/16/2019     PT/INR:  No results found for: PTINR  U/A:    Lab Results   Component Value Date    LEUKOCYTESUR SMALL 01/15/2019    WBCUA 8 01/15/2019    RBCUA 1 01/15/2019    SPECGRAV 1.022 01/15/2019    UROBILINOGEN 1.0 01/15/2019    BILIRUBINUR Negative 01/15/2019    BLOODU Negative 01/15/2019    GLUCOSEU Negative 01/15/2019    PROTEINU Negative 01/15/2019     ABG:    Lab Results   Component Value Date    PHART 7.512 11/06/2018    XXR7VYI 34.5 11/06/2018    K9KGSKJR 98.0 11/06/2018    POG4GGT 27.6 11/06/2018    BEART 4.5 11/06/2018    PO2ART 88.2 11/06/2018    EUE3PHQ 64.2 11/06/2018           Intake/Output Summary (Last 24 hours) at 01/17/19 1300  Last data filed at 01/17/19 1023   Gross per 24 hour   Intake             1100 ml   Output             1060 ml   Net               40 ml       Consults:  IP CONSULT TO GENERAL SURGERY  IP CONSULT TO HOSPITALIST  IP CONSULT TO GENERAL SURGERY  PHARMACY TO DOSE WARFARIN  IP CONSULT TO [address] Problems    Diagnosis Date Noted    Nausea and vomiting [R11.2] 01/15/2019    Chronic combined systolic (congestive) and diastolic (congestive) heart failure ([facility] [I50.42] 01/15/2019    Intra-abdominal free air of unknown etiology [K66.8] 01/14/2019    Abdominal pain, right upper quadrant [R10.11]     CAD (coronary artery disease) [I25.10]     Atrial fibrillation ([facility] [I48.91]     Asthma [J45.909] 01/12/2018    Diabetes mellitus type 2 in obese ([facility] [E11.69, E66.9] 04/27/2017    Essential hypertension [I10] 03/25/2013    Mixed hyperlipidemia [E78.2] 12/19/2012       ASSESSMENT AND PLAN:    Small bowel pneumatosis/pneumoperitoneum: ? Etiology. Surgery consulted.  CT abdomen today showed decrease in pneumoperitoneum. Continue nothing by mouth. Start on IV fluids gently. Hold diuretics. CAD (coronary artery disease) -  Beta Blocker, Statin and Coumadin    Atrial fibrillation (HCC) - rate controlled; continue Digoxin, Coreg. INR 2. Now holding Coumadin in case need for surgery arises. Follow-up INR daily. Chronic combined systolic (congestive) and diastolic (congestive) heart failure (HCC) - compensated. Hold diuretics for now. Diabetes mellitus II -Sugars running on the lower side. Decreased the Lantus dose. Sliding scale Insulin.       Essential (primary) hypertension - controlled    Hyperlipidemia - continue statin    Asthma - stable;  Prn Nebs          DVT Prophylaxis: INR 2  Diet: Diet NPO Effective Now Exceptions are: Sips with Meds  Code Status: Full Code    PT/OT Eval Status: Not Ordered    Dispo - inpatient    Yudi Loving MD

## 2019-01-17 NOTE — PLAN OF CARE
Problem: Falls - Risk of:  Goal: Will remain free from falls  Will remain free from falls   Ambulated in the dan with family. Sitting in chair outside the room. C/o feeling \" a little woozy\". No issues walking. Problem: Pain:  Goal: Pain level will decrease  Pain level will decrease   Has some pain but declines pain med at this time. Pain started rt side lower middle back and now radiated up to shoulder. Problem: HEMODYNAMIC STATUS  Goal: Patient has stable vital signs and fluid balance  Vital signs are stable despite feeling, \" woozy\" see flowsheets. Afebrile.

## 2019-01-17 NOTE — PLAN OF CARE
Problem: Pain:  Goal: Control of acute pain  Control of acute pain   Medicated with Dilaudid earlier for level 8 back and shoulder pain.  Reduced pain quickly and was able to rest.

## 2019-01-17 NOTE — PROGRESS NOTES
ADVANCED CARE PLANNING    John Du       :  1945              MRN:  [id number]      Purpose of Encounter: Advanced care planning in light of Free intraperitoneal air   Parties in attendance: :Estrada Mason MD  Decisional Capacity:Yes    Subjective/Patient Story: Patient understands that her overall function continues to deteriorate. Patient no longer wishes further curative interventions, including hospitalization, if there is no long term benefit :No  Patient wishes to continue further interventions, patient will re-evaluate at a later time: Yes    Objective/Medical Story:  Patient 68 y.o. woman with a history of Hypertension, Hyperlipidemia, Diabetes Mellitus Type II and Coronary Artery Disease who was admitted on 2019 for treatment following a 4 days h/o abdominal and back pain under R ribs and was found to have free intraperitoneal air     Goals of Care Determinations: Patient wishes to focus on treatment and return to home. Plan: Will notify Marisela Villavicencio MD of change in care plan. Will look at further interventions as needed. Code Status: At this time patient wishes to be Full Code    Time Spent on Advanced Planning Documents: 30+ minutes    Advanced Care Planning Documents: Completed advances directives, advanced directives in chart. Electronically signed by Jess Reyna MD   Thank you Marisela Villavicencio MD for the opportunity to be involved in this patient's care. If you have any questions or concerns please feel free to contact me at [phone].

## 2019-01-17 NOTE — PROGRESS NOTES
[facility]        Progress Note    Patient Name: Conor Green  MRN: [id number]  YOB: 1945  Date of Evaluation: 2019    Chief Complaint: Back pain    Subjective:  No acute events overnight  Continues to deny abdominal pain and reports that back pain has begun to improve  Mild intermittent nausea but no vomiting  Passing flatus, and reports watery BM--non-bloody      Vital Signs:  Patient Vitals for the past 24 hrs:   BP Temp Temp src Pulse Resp SpO2 Weight   19 1111 129/79 97 °F (36.1 °C) Temporal 85 16 95 % -   19 0804 124/75 97.6 °F (36.4 °C) Temporal 83 16 94 % -   19 0737 - - - - - - 204 lb 9.6 oz (92.8 kg)   19 0541 114/66 98.5 °F (36.9 °C) Temporal 82 16 - -   19 0220 109/67 97.2 °F (36.2 °C) Temporal 87 16 93 % -   19 1949 137/62 97.6 °F (36.4 °C) Temporal 81 18 94 % -   19 1617 117/65 97.2 °F (36.2 °C) Temporal 86 16 94 % -      TEMPERATURE HISTORY 24H: Temp (24hrs), Av.5 °F (36.4 °C), Min:97 °F (36.1 °C), Max:98.5 °F (36.9 °C)    BLOOD PRESSURE HISTORY: Systolic (31PBC), XII:222 , Min:109 , LEB:866    Diastolic (56GET), MXK:61, Min:62, Max:79      Intake/Output:  I/O last 3 completed shifts:   In: 100 [IV Piggyback:100]  Out: 1110 [Urine:1110]  I/O this shift:  In: 1000 [P.O.:1000]  Out: -   Drain/tube Output:       Physical Exam:  General: awake, alert, oriented to  person, place, time  Lungs: clear to auscultation  Abdomen: soft, nontender, nondistended, no masses or organomegaly   SKIN:  no bruising or bleeding and normal skin color, texture, turgor      Labs:  CBC:    Recent Labs      01/15/19   0510  19   0456  19   0505   WBC  10.0  10.0  9.7   HGB  11.4*  10.9*  10.6*   HCT  34.3*  33.4*  32.2*   PLT  181  182  185     BMP:    Recent Labs      19   1927  19   0456  19   0505   NA  138  139  142   K  3.9  3.5  3.5   CL  101  102  103   CO2  25  26  27   BUN  20 21*  22*   CREATININE  1.4*  1.5*  1.6*   GLUCOSE  175*  77  85     Hepatic:   Recent Labs      01/14/19 1927 01/16/19   0456   AST  12*  13*   ALT  8*  9*   BILITOT  0.7  0.8   ALKPHOS  103  98     Amylase: No results for input(s): AMYLASE in the last 72 hours. Lipase:   Recent Labs      01/14/19 1927   LIPASE  78.0*     Mag:      Recent Labs      01/16/19   0456  01/17/19   0505   MG  2.10  2.10      Phos:     Recent Labs      01/16/19   0456   PHOS  4.7      Coags:   Recent Labs      01/15/19   0510  01/16/19   0456  01/17/19   0505   INR  1.70*  1.92*  2.04*   APTT   --   34.2   --        Cultures:  Anaerobic culture  No results for input(s): LABANAE in the last 72 hours. Blood culture  No results for input(s): BC in the last 72 hours. Blood culture 2  No results for input(s): BLOODCULT2 in the last 72 hours. Body fluid culture  No results for input(s): BLOODCULT2 in the last 72 hours. Surgical culture  No results for input(s): CXSURG in the last 72 hours. Fecal occult  No results for input(s): OCCULTBLDFEC in the last 72 hours. Gram stain  No results for input(s): LABGRAM in the last 72 hours. Stool culture 1  No results for input(s): CXST in the last 72 hours. Stool culture 2  No results for input(s): STOOLCULT2 in the last 72 hours. Urine culture  Recent Labs      01/15/19   0445   LABURIN  No growth at 18-36 hours       Wound abscess  No results for input(s): WNDABS in the last 72 hours. Pathology:  NA    Imaging:  I have personally reviewed the following films:    CT ABDOMEN PELVIS WO CONTRAST Additional Contrast? Oral [[id number]] Collected: 01/17/19 1148 Updated: 01/17/19 1159 Narrative:   EXAMINATION:  CT OF THE ABDOMEN AND PELVIS WITHOUT CONTRAST 1/17/2019 10:40 am    TECHNIQUE:  CT of the abdomen and pelvis was performed without the administration of  intravenous contrast. Multiplanar reformatted images are provided for review.   Dose modulation, iterative pneumatosis intestinalis is a consideration. Note is made that the patient has demonstrated pneumatosis and free air on prior examinations. 2. Extensive atherosclerosis. 3. There is a 7.5 cm simple left adnexal cystic lesion, not changed since 10/01/2018. Please see below for management recommendations. This report was discussed with Dr. Izzy Jennings at 9:11 p.m. on 01/14/2019. RECOMMENDATIONS: Recommend follow-up pelvic MRI with IV contrast or surgical evaluation. Reference: J Am An Radiol 2013;10:675-681       Scheduled Meds:   warfarin (COUMADIN) daily dosing (placeholder)   Other RX Placeholder    [START ON 1/18/2019] insulin glargine  40 Units Subcutaneous Daily    aspirin  81 mg Oral Daily    piperacillin-tazobactam  3.375 g Intravenous Q8H    sodium chloride flush  10 mL Intravenous 2 times per day    montelukast  10 mg Oral Nightly    rosuvastatin  20 mg Oral Daily    carvedilol  3.125 mg Oral BID WC    digoxin  125 mcg Oral Daily    spironolactone  25 mg Oral Daily    torsemide  20 mg Oral Daily    pantoprazole  40 mg Oral QAM AC    insulin lispro  0-12 Units Subcutaneous TID WC    insulin lispro  0-6 Units Subcutaneous Nightly     Continuous Infusions:   sodium chloride 75 mL/hr at 01/17/19 1237    dextrose       PRN Meds:. HYDROmorphone, ipratropium-albuterol, sodium chloride flush, magnesium hydroxide, ondansetron, oxyCODONE, glucose, dextrose, glucagon (rDNA), dextrose      Assessment:  68 y.o. female admitted with   1. Intra-abdominal free air of unknown etiology    2. Pneumatosis intestinalis    3. Acute right-sided thoracic back pain        Pneumoperitoneum  Pneumatosis intestinalis, small bowel  Arteriolosclerosis  Atrial fibrillation on Coumadin  Chronic kidney disease  CAD, s/p CABG      Plan:  1.  No plans for surgical intervention at this time; abdominal exam remains benign and back pain is improving; repeat CT imaging demonstrates a decrease in extent of small bowel pneumatosis and free air  2. NPO diet with sips at this time  3. IV hydration  4. Antibiotics  5. Activity as tolerated  6. Pulmonary toilet, incentive spirometry  7. PRN analgesics and antiemetics--minimizing narcotics as tolerated  8. DVT prophylaxis defer to primary team; would hold Coumadin at this time in case the need for surgery should arrise, would recommend shorter acting agents, Heparin if bridging is necessary  9. Management of medical comorbid etiologies per primary team and consulting services    EDUCATION:  Educated patient on plan of care and disease process--all questions answered. Plans discussed with patient and nursing. Reviewed and discussed with Dr. Larnell Homans. Signed:  SOLEDAD Holly - CNP  1/17/2019 12:49 PM     I have personally performed a face to face diagnostic evaluation on this patient. I have interviewed and examined the patient and I agree with the assessment above. In summary, my findings and plan are the following:   Ms. Dawson Edwards is a 68 y.o. female who presents with   Pneumoperitoneum  Pneumatosis intestinalis, small bowel  Arteriolosclerosis  Atrial fibrillation on Coumadin  Chronic kidney disease  CAD, s/p CABG     Plan:  1. Abdomen benign, does not need emergent exploration unless condition deteriorates. CT shows improving pneumoperitoneum and pneumatosis. Still has back pain that is distinctly different than her chronic back pain. Will monitor carefully, no abdominal pain  2. NPO until pain improves  3. IVF  4. Antibiotics  5. Pain medication and antiemetics as needed with caution as may mask worsening exam  6. Recommend holding coumadin, if anticoagulation bridge necessary recommend heparin secondary to short half life in preparation for possible intervention if necessary    Douglas B. Larnell Homans MD, FACS  1/17/2019  7:13 PM

## 2019-01-17 NOTE — PLAN OF CARE
Problem: Pain:  Goal: Pain level will decrease  Pain level will decrease   Outcome: Ongoing  Continue pain control for lower right sided back pain. Davin Parsons RN    Problem: Nutrition  Goal: Optimal nutrition therapy  Outcome: Ongoing  NPO for now.

## 2019-01-18 ENCOUNTER — HOSPITAL ENCOUNTER (OUTPATIENT)
Dept: CARDIAC REHAB | Age: 74
Setting detail: THERAPIES SERIES
End: 2019-01-18
Payer: MEDICARE

## 2019-01-18 LAB
ANION GAP SERPL CALCULATED.3IONS-SCNC: 13 MMOL/L (ref 3–16)
BASOPHILS ABSOLUTE: 0 K/UL (ref 0–0.2)
BASOPHILS RELATIVE PERCENT: 0.3 %
BUN BLDV-MCNC: 21 MG/DL (ref 7–20)
CALCIUM SERPL-MCNC: 9 MG/DL (ref 8.3–10.6)
CHLORIDE BLD-SCNC: 105 MMOL/L (ref 99–110)
CO2: 25 MMOL/L (ref 21–32)
CREAT SERPL-MCNC: 1.4 MG/DL (ref 0.6–1.2)
EOSINOPHILS ABSOLUTE: 4.1 K/UL (ref 0–0.6)
EOSINOPHILS RELATIVE PERCENT: 45.9 %
GFR AFRICAN AMERICAN: 45
GFR NON-AFRICAN AMERICAN: 37
GLUCOSE BLD-MCNC: 103 MG/DL (ref 70–99)
GLUCOSE BLD-MCNC: 162 MG/DL (ref 70–99)
GLUCOSE BLD-MCNC: 183 MG/DL (ref 70–99)
GLUCOSE BLD-MCNC: 217 MG/DL (ref 70–99)
GLUCOSE BLD-MCNC: 84 MG/DL (ref 70–99)
GLUCOSE BLD-MCNC: 85 MG/DL (ref 70–99)
GLUCOSE BLD-MCNC: 95 MG/DL (ref 70–99)
HCT VFR BLD CALC: 31.7 % (ref 36–48)
HEMATOLOGY PATH CONSULT: NO
HEMOGLOBIN: 10.4 G/DL (ref 12–16)
INR BLD: 2.02 (ref 0.86–1.14)
LACTIC ACID: 0.7 MMOL/L (ref 0.4–2)
LYMPHOCYTES ABSOLUTE: 1.3 K/UL (ref 1–5.1)
LYMPHOCYTES RELATIVE PERCENT: 13.9 %
MAGNESIUM: 2.1 MG/DL (ref 1.8–2.4)
MCH RBC QN AUTO: 27.6 PG (ref 26–34)
MCHC RBC AUTO-ENTMCNC: 32.8 G/DL (ref 31–36)
MCV RBC AUTO: 84.3 FL (ref 80–100)
MONOCYTES ABSOLUTE: 0.5 K/UL (ref 0–1.3)
MONOCYTES RELATIVE PERCENT: 6.1 %
NEUTROPHILS ABSOLUTE: 3 K/UL (ref 1.7–7.7)
NEUTROPHILS RELATIVE PERCENT: 33.8 %
PDW BLD-RTO: 16.1 % (ref 12.4–15.4)
PERFORMED ON: ABNORMAL
PERFORMED ON: NORMAL
PERFORMED ON: NORMAL
PLATELET # BLD: 180 K/UL (ref 135–450)
PMV BLD AUTO: 8.1 FL (ref 5–10.5)
POTASSIUM REFLEX MAGNESIUM: 3.4 MMOL/L (ref 3.5–5.1)
PROTHROMBIN TIME: 23 SEC (ref 9.8–13)
RBC # BLD: 3.76 M/UL (ref 4–5.2)
SODIUM BLD-SCNC: 143 MMOL/L (ref 136–145)
WBC # BLD: 9 K/UL (ref 4–11)

## 2019-01-18 PROCEDURE — 6370000000 HC RX 637 (ALT 250 FOR IP): Performed by: INTERNAL MEDICINE

## 2019-01-18 PROCEDURE — 6360000002 HC RX W HCPCS: Performed by: INTERNAL MEDICINE

## 2019-01-18 PROCEDURE — 6360000002 HC RX W HCPCS: Performed by: NURSE PRACTITIONER

## 2019-01-18 PROCEDURE — 80048 BASIC METABOLIC PNL TOTAL CA: CPT

## 2019-01-18 PROCEDURE — 2580000003 HC RX 258: Performed by: INTERNAL MEDICINE

## 2019-01-18 PROCEDURE — 83735 ASSAY OF MAGNESIUM: CPT

## 2019-01-18 PROCEDURE — 36415 COLL VENOUS BLD VENIPUNCTURE: CPT

## 2019-01-18 PROCEDURE — APPSS15 APP SPLIT SHARED TIME 0-15 MINUTES: Performed by: NURSE PRACTITIONER

## 2019-01-18 PROCEDURE — APPNB30 APP NON BILLABLE TIME 0-30 MINS: Performed by: NURSE PRACTITIONER

## 2019-01-18 PROCEDURE — 2060000000 HC ICU INTERMEDIATE R&B

## 2019-01-18 PROCEDURE — 2500000003 HC RX 250 WO HCPCS: Performed by: INTERNAL MEDICINE

## 2019-01-18 PROCEDURE — 99232 SBSQ HOSP IP/OBS MODERATE 35: CPT | Performed by: SURGERY

## 2019-01-18 PROCEDURE — 83605 ASSAY OF LACTIC ACID: CPT

## 2019-01-18 PROCEDURE — 6370000000 HC RX 637 (ALT 250 FOR IP): Performed by: SURGERY

## 2019-01-18 PROCEDURE — 85025 COMPLETE CBC W/AUTO DIFF WBC: CPT

## 2019-01-18 PROCEDURE — 85610 PROTHROMBIN TIME: CPT

## 2019-01-18 RX ORDER — POTASSIUM CHLORIDE 7.45 MG/ML
10 INJECTION INTRAVENOUS
Status: COMPLETED | OUTPATIENT
Start: 2019-01-18 | End: 2019-01-18

## 2019-01-18 RX ORDER — WARFARIN SODIUM 2.5 MG/1
2.5 TABLET ORAL DAILY
Status: DISCONTINUED | OUTPATIENT
Start: 2019-01-18 | End: 2019-01-19

## 2019-01-18 RX ADMIN — HYDROMORPHONE HYDROCHLORIDE 0.5 MG: 1 INJECTION, SOLUTION INTRAMUSCULAR; INTRAVENOUS; SUBCUTANEOUS at 16:16

## 2019-01-18 RX ADMIN — SODIUM CHLORIDE: 9 INJECTION, SOLUTION INTRAVENOUS at 08:29

## 2019-01-18 RX ADMIN — WARFARIN SODIUM 2.5 MG: 2.5 TABLET ORAL at 18:07

## 2019-01-18 RX ADMIN — TAZOBACTAM SODIUM AND PIPERACILLIN SODIUM 3.38 G: 375; 3 INJECTION, SOLUTION INTRAVENOUS at 05:32

## 2019-01-18 RX ADMIN — DIGOXIN 125 MCG: 125 TABLET ORAL at 08:23

## 2019-01-18 RX ADMIN — POTASSIUM CHLORIDE 10 MEQ: 7.46 INJECTION, SOLUTION INTRAVENOUS at 11:28

## 2019-01-18 RX ADMIN — INSULIN LISPRO 2 UNITS: 100 INJECTION, SOLUTION INTRAVENOUS; SUBCUTANEOUS at 18:07

## 2019-01-18 RX ADMIN — CARVEDILOL 3.12 MG: 3.12 TABLET, FILM COATED ORAL at 08:24

## 2019-01-18 RX ADMIN — CARVEDILOL 3.12 MG: 3.12 TABLET, FILM COATED ORAL at 18:07

## 2019-01-18 RX ADMIN — ONDANSETRON HYDROCHLORIDE 4 MG: 2 INJECTION, SOLUTION INTRAMUSCULAR; INTRAVENOUS at 12:04

## 2019-01-18 RX ADMIN — TAZOBACTAM SODIUM AND PIPERACILLIN SODIUM 3.38 G: 375; 3 INJECTION, SOLUTION INTRAVENOUS at 14:07

## 2019-01-18 RX ADMIN — INSULIN LISPRO 2 UNITS: 100 INJECTION, SOLUTION INTRAVENOUS; SUBCUTANEOUS at 23:53

## 2019-01-18 RX ADMIN — OXYCODONE HYDROCHLORIDE 5 MG: 5 TABLET ORAL at 12:04

## 2019-01-18 RX ADMIN — ASPIRIN 81 MG 81 MG: 81 TABLET ORAL at 08:24

## 2019-01-18 RX ADMIN — PANTOPRAZOLE SODIUM 40 MG: 40 TABLET, DELAYED RELEASE ORAL at 05:32

## 2019-01-18 RX ADMIN — HYDROMORPHONE HYDROCHLORIDE 0.5 MG: 1 INJECTION, SOLUTION INTRAMUSCULAR; INTRAVENOUS; SUBCUTANEOUS at 02:55

## 2019-01-18 RX ADMIN — POTASSIUM CHLORIDE 10 MEQ: 7.46 INJECTION, SOLUTION INTRAVENOUS at 10:03

## 2019-01-18 RX ADMIN — ROSUVASTATIN CALCIUM 20 MG: 10 TABLET, FILM COATED ORAL at 08:23

## 2019-01-18 ASSESSMENT — PAIN DESCRIPTION - ORIENTATION
ORIENTATION: RIGHT

## 2019-01-18 ASSESSMENT — PAIN SCALES - GENERAL
PAINLEVEL_OUTOF10: 4
PAINLEVEL_OUTOF10: 4
PAINLEVEL_OUTOF10: 2
PAINLEVEL_OUTOF10: 5
PAINLEVEL_OUTOF10: 0
PAINLEVEL_OUTOF10: 6

## 2019-01-18 ASSESSMENT — PAIN DESCRIPTION - PROGRESSION
CLINICAL_PROGRESSION: NOT CHANGED
CLINICAL_PROGRESSION: NOT CHANGED
CLINICAL_PROGRESSION: GRADUALLY IMPROVING

## 2019-01-18 ASSESSMENT — PAIN DESCRIPTION - PAIN TYPE
TYPE: ACUTE PAIN

## 2019-01-18 ASSESSMENT — PAIN DESCRIPTION - LOCATION
LOCATION: SHOULDER
LOCATION: SHOULDER
LOCATION: BACK;SHOULDER
LOCATION: SHOULDER
LOCATION: SHOULDER

## 2019-01-18 ASSESSMENT — PAIN DESCRIPTION - ONSET
ONSET: ON-GOING

## 2019-01-18 ASSESSMENT — PAIN DESCRIPTION - DIRECTION: RADIATING_TOWARDS: LOWER

## 2019-01-18 ASSESSMENT — PAIN DESCRIPTION - FREQUENCY
FREQUENCY: CONTINUOUS

## 2019-01-18 ASSESSMENT — PAIN DESCRIPTION - DESCRIPTORS
DESCRIPTORS: CONSTANT

## 2019-01-18 NOTE — PROGRESS NOTES
Hospitalist   Progress Note    Patient Name: Griselda Vázquez  PCP: Suella Ahumada, MD  Date of Admission: 1/14/2019    Chief Complaint on Admission: Abdominal and back pain under R ribs that started four days ago. + N/V  Chief diagnosis after evaluation: Free intraperitoneal air     Brief Synopsis: Patient 68 y.o. woman with a history of Hypertension, Hyperlipidemia, Diabetes Mellitus Type II and Coronary Artery Disease who was admitted on 1/14/2019 for treatment following a 4 days h/o abdominal and back pain under R ribs and was found to have free intraperitoneal air . Conservative mx- repeat ct abd showing improvement. Started on clears now    Pt Seen/Examined and Chart Reviewed. Subjective: tolerating clears ok. Pain is manageable around rt scapular area. Objective: Allergies  Xxjgwtnt-woyccyl-neosdz [fluocinolone]; Ciprofloxacin; Diovan [valsartan]; Flagyl [metronidazole]; Metformin and related [metformin and related];  Benazepril; Morphine; Saxagliptin; and Levaquin [levofloxacin]    Medications    Scheduled Meds:   [START ON 1/19/2019] insulin glargine  35 Units Subcutaneous Daily    warfarin  2.5 mg Oral Daily    aspirin  81 mg Oral Daily    piperacillin-tazobactam  3.375 g Intravenous Q8H    sodium chloride flush  10 mL Intravenous 2 times per day    montelukast  10 mg Oral Nightly    rosuvastatin  20 mg Oral Daily    carvedilol  3.125 mg Oral BID WC    digoxin  125 mcg Oral Daily    pantoprazole  40 mg Oral QAM AC    insulin lispro  0-12 Units Subcutaneous TID WC    insulin lispro  0-6 Units Subcutaneous Nightly     Infusions:   sodium chloride 50 mL/hr at 01/18/19 0829    dextrose       PRN Meds:  HYDROmorphone, ipratropium-albuterol, sodium chloride flush, magnesium hydroxide, ondansetron, oxyCODONE, glucose, dextrose, glucagon (rDNA), dextrose    Physical    VITALS:  /74   Pulse 70   Temp 97.8 °F (36.6 °C) (Oral)   Resp 16   Ht 5' 8\" (1.727 m)   Wt 198 lb (89.8 kg) SpO2 94%   BMI 30.11 kg/m²     CONSTITUTIONAL:  68y.o. year-old  female who is awake, alert, cooperative, no apparent distress, and appears stated age  EYES:  Lids and lashes normal, PERRL, EOMI, sclera clear, conjunctiva normal  ENT:  NC/AT, MMM    NECK:  Supple, symmetrical, trachea midline, no adenopathy  HEMATOLOGIC/LYMPHATICS:  no cervical, supraclavicular or axillary lymphadenopathy  LUNGS:  clear to auscultation bilaterally, No increased work of breathing, good air exchange, no crackles or wheezing  CARDIOVASCULAR:  Regular rate and rhythm, normal S1 and S2, no S3 or S4, and no significant murmurs, rubs or gallops noted. Normal apical impulse. ABDOMEN:  Normal active bowel sounds, soft, non-tender, non-distended, no masses palpated, no organomegally  MUSCULOSKELETAL:  Full range of motion noted. NEUROLOGIC:  Awake, alert, oriented to name, place and time. Cranial nerves II-XII are grossly intact. SKIN:  normal skin color, texture, turgor for age.     Data    CBC with Differential:    Lab Results   Component Value Date    WBC 9.0 01/18/2019    HGB 10.4 01/18/2019    HCT 31.7 01/18/2019     01/18/2019    MCV 84.3 01/18/2019    RDW 16.1 01/18/2019    BANDSPCT 1 01/14/2019    LYMPHOPCT 13.9 01/18/2019    MONOPCT 6.1 01/18/2019    BASOPCT 0.3 01/18/2019    MONOSABS 0.5 01/18/2019    LYMPHSABS 1.3 01/18/2019    EOSABS 4.1 01/18/2019    BASOSABS 0.0 01/18/2019     BMP:    Lab Results   Component Value Date     01/18/2019    K 3.4 01/18/2019     01/18/2019    CO2 25 01/18/2019    BUN 21 01/18/2019    CREATININE 1.4 01/18/2019    GLUCOSE 84 01/18/2019    CALCIUM 9.0 01/18/2019    GFRAA 45 01/18/2019    LABGLOM 37 01/18/2019    LABGLOM 45 12/06/2018     LFT:   Lab Results   Component Value Date    ALKPHOS 98 01/16/2019    ALT 9 01/16/2019    AST 13 01/16/2019    PROT 7.4 01/16/2019    BILITOT 0.8 01/16/2019    BILIDIR <0.2 08/18/2018    IBILI see below 08/18/2018     Magnesium: Lab Results   Component Value Date    MG 2.10 01/18/2019     Phosphorus:    Lab Results   Component Value Date    PHOS 4.7 01/16/2019     PT/INR:  No results found for: PTINR  U/A:    Lab Results   Component Value Date    LEUKOCYTESUR SMALL 01/15/2019    WBCUA 8 01/15/2019    RBCUA 1 01/15/2019    SPECGRAV 1.022 01/15/2019    UROBILINOGEN 1.0 01/15/2019    BILIRUBINUR Negative 01/15/2019    BLOODU Negative 01/15/2019    GLUCOSEU Negative 01/15/2019    PROTEINU Negative 01/15/2019     ABG:    Lab Results   Component Value Date    PHART 7.512 11/06/2018    PDO3JZR 34.5 11/06/2018    P4LWSGFR 98.0 11/06/2018    VKI5FTP 27.6 11/06/2018    BEART 4.5 11/06/2018    PO2ART 88.2 11/06/2018    YAI9WYU 64.2 11/06/2018           Intake/Output Summary (Last 24 hours) at 01/18/19 1430  Last data filed at 01/18/19 1130   Gross per 24 hour   Intake             2138 ml   Output              600 ml   Net             1538 ml       Consults:  IP CONSULT TO GENERAL SURGERY  IP CONSULT TO HOSPITALIST  IP CONSULT TO GENERAL SURGERY  PHARMACY TO DOSE WARFARIN  IP CONSULT TO VASCULAR SURGERY      Active Hospital Problems    Diagnosis Date Noted    Nausea and vomiting [R11.2] 01/15/2019    Chronic combined systolic (congestive) and diastolic (congestive) heart failure ([facility] [I50.42] 01/15/2019    Intra-abdominal free air of unknown etiology [K66.8] 01/14/2019    Abdominal pain, right upper quadrant [R10.11]     CAD (coronary artery disease) [I25.10]     Atrial fibrillation ([facility] [I48.91]     Asthma [J45.909] 01/12/2018    Diabetes mellitus type 2 in obese ([facility] [E11.69, E66.9] 04/27/2017    Essential hypertension [I10] 03/25/2013    Mixed hyperlipidemia [E78.2] 12/19/2012       ASSESSMENT AND PLAN:    Small bowel pneumatosis/pneumoperitoneum: ? Etiology. Surgery consulted. CT abdomen today showed decrease in pneumoperitoneum. Now on clears. Cont  IV fluids gently. Holding diuretics.     CAD (coronary artery disease) -  Beta Blocker, Statin and Coumadin    Atrial fibrillation (HCC) - rate controlled; continue Digoxin, Coreg. INR 2. Now holding Coumadin in case need for surgery arises. Follow-up INR daily. Chronic combined systolic (congestive) and diastolic (congestive) heart failure (HCC) - compensated. Hold diuretics for now. Diabetes mellitus II -Sugars running on the lower side. Decrease the Lantus again this morning. Cont  Sliding scale Insulin.       Essential (primary) hypertension - controlled    Hyperlipidemia - continue statin    Asthma - stable;  Prn Nebs          DVT Prophylaxis: INR 2  Diet: DIET CLEAR LIQUID;  Code Status: Full Code    PT/OT Eval Status: Not Ordered    Dispo - inpatient 2 days     Chandu Lopez MD

## 2019-01-18 NOTE — PLAN OF CARE
Problem: Pain:  Goal: Pain level will decrease  Pain level will decrease   Pain is minimal at this time. C/o of nausea. Zofran given. Instructed to call if pain gets worse.  V.U.

## 2019-01-18 NOTE — PROGRESS NOTES
[facility]        Progress Note    Patient Name: Sydney Rebollar  MRN: [id number]  YOB: 1945  Date of Evaluation: 2019    Chief Complaint: Back pain    Subjective:  No acute events overnight  Up to chair at this time  Continues to deny abdominal pain, back pain continues to improve  Mild intermittent nausea but no vomiting  Passing flatus and stool      Vital Signs:  Patient Vitals for the past 24 hrs:   BP Temp Temp src Pulse Resp SpO2 Weight   19 0817 113/68 96.8 °F (36 °C) Temporal 75 16 93 % -   19 0719 - - - - - - 198 lb (89.8 kg)   19 0525 123/70 98 °F (36.7 °C) Temporal 74 16 92 % -   19 0253 115/71 97.3 °F (36.3 °C) Temporal 76 16 95 % -   19 2013 119/70 97.9 °F (36.6 °C) Oral 81 16 94 % -   19 1613 110/65 97.2 °F (36.2 °C) Temporal 68 16 93 % -   19 1111 129/79 97 °F (36.1 °C) Temporal 85 16 95 % -      TEMPERATURE HISTORY 24H: Temp (24hrs), Av.4 °F (36.3 °C), Min:96.8 °F (36 °C), Max:98 °F (36.7 °C)    BLOOD PRESSURE HISTORY: Systolic (67YWK), YIV:153 , Min:109 , GRL:566    Diastolic (46PJB), WUF:57, Min:65, Max:79      Intake/Output:  I/O last 3 completed shifts: In: 2658 [P.O.:1000; I.V.:1558; IV Piggyback:100]  Out: 600 [Urine:600]  No intake/output data recorded.   Drain/tube Output:       Physical Exam:  General: awake, alert, oriented to  person, place, time  Lungs: clear to auscultation  Abdomen: soft, nontender, nondistended, no masses or organomegaly   SKIN:  no bruising or bleeding and normal skin color, texture, turgor      Labs:  CBC:    Recent Labs      19   0456  19   0505  19   0528   WBC  10.0  9.7  9.0   HGB  10.9*  10.6*  10.4*   HCT  33.4*  32.2*  31.7*   PLT  182  185  180     BMP:    Recent Labs      19   0456  19   0505  19   0528   NA  139  142  143   K  3.5  3.5  3.4*   CL  102  103  105   CO2  26  27  25   BUN  21*  22*  21*   CREATININE 1. 5*  1.6*  1.4*   GLUCOSE  77  85  84     Hepatic:   Recent Labs      01/16/19   0456   AST  13*   ALT  9*   BILITOT  0.8   ALKPHOS  98     Amylase: No results for input(s): AMYLASE in the last 72 hours. Lipase:   No results for input(s): LIPASE in the last 72 hours. Mag:      Recent Labs      01/16/19   0456  01/17/19   0505  01/18/19   0528   MG  2.10  2.10  2.10      Phos:     Recent Labs      01/16/19   0456   PHOS  4.7      Coags:   Recent Labs      01/16/19   0456  01/17/19   0505  01/18/19   0528   INR  1.92*  2.04*  2.02*   APTT  34.2   --    --        Cultures:  Anaerobic culture  No results for input(s): LABANAE in the last 72 hours. Blood culture  No results for input(s): BC in the last 72 hours. Blood culture 2  No results for input(s): BLOODCULT2 in the last 72 hours. Body fluid culture  No results for input(s): BLOODCULT2 in the last 72 hours. Surgical culture  No results for input(s): CXSURG in the last 72 hours. Fecal occult  No results for input(s): OCCULTBLDFEC in the last 72 hours. Gram stain  No results for input(s): LABGRAM in the last 72 hours. Stool culture 1  No results for input(s): CXST in the last 72 hours. Stool culture 2  No results for input(s): STOOLCULT2 in the last 72 hours. Urine culture  No results for input(s): LABURIN in the last 72 hours. Wound abscess  No results for input(s): WNDABS in the last 72 hours. Pathology:  NA    Imaging:  I have personally reviewed the following films:    CT ABDOMEN PELVIS WO CONTRAST Additional Contrast? Oral [[id number]] Collected: 01/17/19 1148 Updated: 01/17/19 1159 Narrative:   EXAMINATION:  CT OF THE ABDOMEN AND PELVIS WITHOUT CONTRAST 1/17/2019 10:40 am    TECHNIQUE:  CT of the abdomen and pelvis was performed without the administration of  intravenous contrast. Multiplanar reformatted images are provided for review.   Dose modulation, iterative reconstruction, and/or weight based adjustment of  the mA/kV was utilized to reduce the radiation dose to as low as reasonably  achievable. COMPARISON:  01/14/2019, 10/01/2018    HISTORY:  ORDERING SYSTEM PROVIDED HISTORY: ABDOMINAL PAIN  TECHNOLOGIST PROVIDED HISTORY:  Additional Contrast?->Oral  Ordering Physician Provided Reason for Exam: ABDOMINAL PAINback pain,  pneumoperitoneum, pneumatosis followup  Acuity: Unknown  Type of Exam: Unknown    FINDINGS:  Lower Chest: Partially visualized heart is enlarged. Lower lungs appear  clear. Organs: Unenhanced liver, spleen, pancreas, adrenal glands, and kidneys  appear unremarkable. Gallbladder surgically absent. GI/Bowel: No bowel obstruction. Compared to the previous study, there is  interval decrease in amount of small bowel pneumatosis. No significant bowel  wall thickening of the small or large bowel. Pelvis: Urinary bladder and uterus are unremarkable. Redemonstration of a  8.1 x 5.8 cm AP/TR hypodense lesion involving the left ovary. Peritoneum/Retroperitoneum: Interval decrease in amount of intraperitoneal  air with scattered amounts mesenteric gas stool present. No free fluid. No  lymphadenopathy by size criteria. Vascular: Diffuse atherosclerotic calcification of the abdominal aorta and  branching vessels. Bones/Soft Tissues: Degenerative changes of the lumbar spine. Impression:   Interval decrease in extent of small bowel pneumatosis and intraperitoneal  air. Etiology of this is again unclear in may be benign. Redemonstration of an 8.1 cm x 5.8 cm cystic lesion appearing to arise from  the left ovary. Given the patient's age, ovarian cystic neoplasm is again  suspected. MRI of the pelvis with contrast or surgical consultation is  recommended.     Ct Abdomen Pelvis Wo Contrast    Result Date: 1/16/2019  EXAMINATION: CT OF THE ABDOMEN AND PELVIS WITHOUT CONTRAST 1/14/2019 8:30 pm TECHNIQUE: CT of the abdomen and pelvis was performed without the administration of intravenous contrast. that the patient has demonstrated pneumatosis and free air on prior examinations. 2. Extensive atherosclerosis. 3. There is a 7.5 cm simple left adnexal cystic lesion, not changed since 10/01/2018. Please see below for management recommendations. This report was discussed with Dr. Andres Darnell at 9:11 p.m. on 01/14/2019. RECOMMENDATIONS: Recommend follow-up pelvic MRI with IV contrast or surgical evaluation. Reference: J Am An Radiol 2013;10:675-681       Scheduled Meds:   potassium chloride  10 mEq Intravenous Q1H    warfarin (COUMADIN) daily dosing (placeholder)   Other RX Placeholder    insulin glargine  40 Units Subcutaneous Daily    aspirin  81 mg Oral Daily    piperacillin-tazobactam  3.375 g Intravenous Q8H    sodium chloride flush  10 mL Intravenous 2 times per day    montelukast  10 mg Oral Nightly    rosuvastatin  20 mg Oral Daily    carvedilol  3.125 mg Oral BID WC    digoxin  125 mcg Oral Daily    pantoprazole  40 mg Oral QAM AC    insulin lispro  0-12 Units Subcutaneous TID WC    insulin lispro  0-6 Units Subcutaneous Nightly     Continuous Infusions:   sodium chloride 50 mL/hr at 01/18/19 0829    dextrose       PRN Meds:. HYDROmorphone, ipratropium-albuterol, sodium chloride flush, magnesium hydroxide, ondansetron, oxyCODONE, glucose, dextrose, glucagon (rDNA), dextrose      Assessment:  68 y.o. female admitted with   1. Intra-abdominal free air of unknown etiology    2. Pneumatosis intestinalis    3. Acute right-sided thoracic back pain        Pneumoperitoneum  Pneumatosis intestinalis, small bowel  Arteriolosclerosis  Atrial fibrillation on Coumadin  Chronic kidney disease  CAD, s/p CABG      Plan:  1. No plans for surgical intervention at this time; abdominal exam remains benign and back pain is improving, vitals/labs stable  2.  Advance to clear liquid diet as tolerated; monitor for nausea/vomiting, if unable to tolerate or pain worsens with PO intake will need to consider parenteral nutrition  3. IV hydration  4. Antibiotics  5. Activity as tolerated  6. Pulmonary toilet, incentive spirometry  7. PRN analgesics and antiemetics--minimizing narcotics as tolerated  8. DVT prophylaxis defer to primary team; would hold Coumadin at this time in case the need for surgery should arrise, would recommend shorter acting agents, Heparin if bridging is necessary  9. Management of medical comorbid etiologies per primary team and consulting services    EDUCATION:  Educated patient on plan of care and disease process--all questions answered. Plans discussed with patient and nursing. Reviewed and discussed with Dr. Nelia Almaraz. Signed:  Rafat Castro, SOLEDAD - CNP  1/18/2019 10:24 AM       I have personally performed a face to face diagnostic evaluation on this patient. I have interviewed and examined the patient and I agree with the assessment above. In summary, my findings and plan are the following:   Ms. Vic Anderson is a 68 y.o. female who presents with   Pneumoperitoneum  Pneumatosis intestinalis, small bowel  Arteriolosclerosis  Atrial fibrillation on Coumadin  Chronic kidney disease  CAD, s/p CABG     Plan:  1. Abdomen benign, does not need emergent exploration unless condition deteriorates. CT shows improving pneumoperitoneum and pneumatosis. Still has back pain that is distinctly different than her chronic back pain but is better today. Will monitor carefully, no abdominal pain  2. Advance to clear liquids  3. IVF  4. Antibiotics  5. Pain medication and antiemetics as needed with caution as may mask worsening exam  6. Recommend holding coumadin, if anticoagulation bridge necessary recommend heparin secondary to short half life in preparation for possible intervention if necessary    Tushar Almaraz MD, FACS  1/18/2019  12:51 PM

## 2019-01-19 LAB
ANION GAP SERPL CALCULATED.3IONS-SCNC: 10 MMOL/L (ref 3–16)
ANISOCYTOSIS: ABNORMAL
ATYPICAL LYMPHOCYTE RELATIVE PERCENT: 1 % (ref 0–6)
BASOPHILS ABSOLUTE: 0 K/UL (ref 0–0.2)
BASOPHILS RELATIVE PERCENT: 0 %
BUN BLDV-MCNC: 16 MG/DL (ref 7–20)
CALCIUM SERPL-MCNC: 9.2 MG/DL (ref 8.3–10.6)
CHLORIDE BLD-SCNC: 105 MMOL/L (ref 99–110)
CO2: 26 MMOL/L (ref 21–32)
CREAT SERPL-MCNC: 1.3 MG/DL (ref 0.6–1.2)
EOSINOPHILS ABSOLUTE: 3.4 K/UL (ref 0–0.6)
EOSINOPHILS RELATIVE PERCENT: 42 %
GFR AFRICAN AMERICAN: 49
GFR NON-AFRICAN AMERICAN: 40
GLUCOSE BLD-MCNC: 149 MG/DL (ref 70–99)
GLUCOSE BLD-MCNC: 159 MG/DL (ref 70–99)
GLUCOSE BLD-MCNC: 179 MG/DL (ref 70–99)
GLUCOSE BLD-MCNC: 182 MG/DL (ref 70–99)
GLUCOSE BLD-MCNC: 226 MG/DL (ref 70–99)
GLUCOSE BLD-MCNC: 230 MG/DL (ref 70–99)
HCT VFR BLD CALC: 31.8 % (ref 36–48)
HEMATOLOGY PATH CONSULT: NO
HEMOGLOBIN: 10.5 G/DL (ref 12–16)
INR BLD: 2.15 (ref 0.86–1.14)
LYMPHOCYTES ABSOLUTE: 1.5 K/UL (ref 1–5.1)
LYMPHOCYTES RELATIVE PERCENT: 18 %
MAGNESIUM: 2.2 MG/DL (ref 1.8–2.4)
MCH RBC QN AUTO: 27.7 PG (ref 26–34)
MCHC RBC AUTO-ENTMCNC: 32.9 G/DL (ref 31–36)
MCV RBC AUTO: 84.2 FL (ref 80–100)
MONOCYTES ABSOLUTE: 0.6 K/UL (ref 0–1.3)
MONOCYTES RELATIVE PERCENT: 8 %
NEUTROPHILS ABSOLUTE: 2.5 K/UL (ref 1.7–7.7)
NEUTROPHILS RELATIVE PERCENT: 31 %
OVALOCYTES: ABNORMAL
PDW BLD-RTO: 16.6 % (ref 12.4–15.4)
PERFORMED ON: ABNORMAL
PLATELET # BLD: 184 K/UL (ref 135–450)
PLATELET SLIDE REVIEW: ADEQUATE
PMV BLD AUTO: 8.3 FL (ref 5–10.5)
POTASSIUM REFLEX MAGNESIUM: 3.3 MMOL/L (ref 3.5–5.1)
PROTHROMBIN TIME: 24.5 SEC (ref 9.8–13)
RBC # BLD: 3.78 M/UL (ref 4–5.2)
SLIDE REVIEW: ABNORMAL
SODIUM BLD-SCNC: 141 MMOL/L (ref 136–145)
WBC # BLD: 8.1 K/UL (ref 4–11)

## 2019-01-19 PROCEDURE — 6370000000 HC RX 637 (ALT 250 FOR IP): Performed by: INTERNAL MEDICINE

## 2019-01-19 PROCEDURE — 85025 COMPLETE CBC W/AUTO DIFF WBC: CPT

## 2019-01-19 PROCEDURE — 85610 PROTHROMBIN TIME: CPT

## 2019-01-19 PROCEDURE — 2500000003 HC RX 250 WO HCPCS: Performed by: INTERNAL MEDICINE

## 2019-01-19 PROCEDURE — 2580000003 HC RX 258: Performed by: INTERNAL MEDICINE

## 2019-01-19 PROCEDURE — 80048 BASIC METABOLIC PNL TOTAL CA: CPT

## 2019-01-19 PROCEDURE — 99232 SBSQ HOSP IP/OBS MODERATE 35: CPT | Performed by: SURGERY

## 2019-01-19 PROCEDURE — 83735 ASSAY OF MAGNESIUM: CPT

## 2019-01-19 PROCEDURE — 6360000002 HC RX W HCPCS: Performed by: INTERNAL MEDICINE

## 2019-01-19 PROCEDURE — 36415 COLL VENOUS BLD VENIPUNCTURE: CPT

## 2019-01-19 PROCEDURE — 2060000000 HC ICU INTERMEDIATE R&B

## 2019-01-19 RX ORDER — POTASSIUM CHLORIDE 20MEQ/15ML
40 LIQUID (ML) ORAL DAILY
Status: DISCONTINUED | OUTPATIENT
Start: 2019-01-19 | End: 2019-01-19 | Stop reason: CLARIF

## 2019-01-19 RX ORDER — POTASSIUM CHLORIDE 20 MEQ/1
40 TABLET, EXTENDED RELEASE ORAL PRN
Status: DISCONTINUED | OUTPATIENT
Start: 2019-01-19 | End: 2019-01-21 | Stop reason: HOSPADM

## 2019-01-19 RX ORDER — POTASSIUM CHLORIDE 7.45 MG/ML
10 INJECTION INTRAVENOUS PRN
Status: DISCONTINUED | OUTPATIENT
Start: 2019-01-19 | End: 2019-01-21 | Stop reason: HOSPADM

## 2019-01-19 RX ORDER — WARFARIN SODIUM 5 MG/1
5 TABLET ORAL DAILY
Status: DISCONTINUED | OUTPATIENT
Start: 2019-01-19 | End: 2019-01-21 | Stop reason: HOSPADM

## 2019-01-19 RX ADMIN — Medication 10 ML: at 09:28

## 2019-01-19 RX ADMIN — TAZOBACTAM SODIUM AND PIPERACILLIN SODIUM 3.38 G: 375; 3 INJECTION, SOLUTION INTRAVENOUS at 21:43

## 2019-01-19 RX ADMIN — CARVEDILOL 3.12 MG: 3.12 TABLET, FILM COATED ORAL at 09:20

## 2019-01-19 RX ADMIN — SODIUM CHLORIDE: 9 INJECTION, SOLUTION INTRAVENOUS at 06:19

## 2019-01-19 RX ADMIN — TAZOBACTAM SODIUM AND PIPERACILLIN SODIUM 3.38 G: 375; 3 INJECTION, SOLUTION INTRAVENOUS at 13:09

## 2019-01-19 RX ADMIN — ROSUVASTATIN CALCIUM 20 MG: 10 TABLET, FILM COATED ORAL at 09:20

## 2019-01-19 RX ADMIN — PANTOPRAZOLE SODIUM 40 MG: 40 TABLET, DELAYED RELEASE ORAL at 06:19

## 2019-01-19 RX ADMIN — INSULIN LISPRO 1 UNITS: 100 INJECTION, SOLUTION INTRAVENOUS; SUBCUTANEOUS at 20:51

## 2019-01-19 RX ADMIN — INSULIN LISPRO 2 UNITS: 100 INJECTION, SOLUTION INTRAVENOUS; SUBCUTANEOUS at 09:20

## 2019-01-19 RX ADMIN — SODIUM CHLORIDE: 9 INJECTION, SOLUTION INTRAVENOUS at 06:36

## 2019-01-19 RX ADMIN — DIGOXIN 125 MCG: 125 TABLET ORAL at 09:20

## 2019-01-19 RX ADMIN — MONTELUKAST SODIUM 10 MG: 10 TABLET, FILM COATED ORAL at 00:05

## 2019-01-19 RX ADMIN — ASPIRIN 81 MG 81 MG: 81 TABLET ORAL at 09:20

## 2019-01-19 RX ADMIN — INSULIN GLARGINE 35 UNITS: 100 INJECTION, SOLUTION SUBCUTANEOUS at 09:37

## 2019-01-19 RX ADMIN — OXYCODONE HYDROCHLORIDE 5 MG: 5 TABLET ORAL at 20:50

## 2019-01-19 RX ADMIN — TAZOBACTAM SODIUM AND PIPERACILLIN SODIUM 3.38 G: 375; 3 INJECTION, SOLUTION INTRAVENOUS at 06:19

## 2019-01-19 RX ADMIN — MONTELUKAST SODIUM 10 MG: 10 TABLET, FILM COATED ORAL at 20:50

## 2019-01-19 RX ADMIN — INSULIN LISPRO 2 UNITS: 100 INJECTION, SOLUTION INTRAVENOUS; SUBCUTANEOUS at 13:10

## 2019-01-19 RX ADMIN — TAZOBACTAM SODIUM AND PIPERACILLIN SODIUM 3.38 G: 375; 3 INJECTION, SOLUTION INTRAVENOUS at 00:14

## 2019-01-19 RX ADMIN — CARVEDILOL 3.12 MG: 3.12 TABLET, FILM COATED ORAL at 18:00

## 2019-01-19 RX ADMIN — ONDANSETRON HYDROCHLORIDE 4 MG: 2 INJECTION, SOLUTION INTRAMUSCULAR; INTRAVENOUS at 20:50

## 2019-01-19 RX ADMIN — WARFARIN SODIUM 5 MG: 5 TABLET ORAL at 18:00

## 2019-01-19 RX ADMIN — POTASSIUM BICARBONATE 40 MEQ: 782 TABLET, EFFERVESCENT ORAL at 09:37

## 2019-01-19 RX ADMIN — OXYCODONE HYDROCHLORIDE 5 MG: 5 TABLET ORAL at 14:20

## 2019-01-19 RX ADMIN — INSULIN LISPRO 4 UNITS: 100 INJECTION, SOLUTION INTRAVENOUS; SUBCUTANEOUS at 17:59

## 2019-01-19 ASSESSMENT — PAIN DESCRIPTION - DESCRIPTORS
DESCRIPTORS: ACHING
DESCRIPTORS: ACHING
DESCRIPTORS: CRAMPING

## 2019-01-19 ASSESSMENT — PAIN SCALES - GENERAL
PAINLEVEL_OUTOF10: 3
PAINLEVEL_OUTOF10: 6
PAINLEVEL_OUTOF10: 2
PAINLEVEL_OUTOF10: 4
PAINLEVEL_OUTOF10: 3
PAINLEVEL_OUTOF10: 3

## 2019-01-19 ASSESSMENT — PAIN DESCRIPTION - LOCATION
LOCATION: SHOULDER
LOCATION: ABDOMEN
LOCATION: SHOULDER

## 2019-01-19 ASSESSMENT — PAIN DESCRIPTION - FREQUENCY
FREQUENCY: INTERMITTENT
FREQUENCY: INTERMITTENT
FREQUENCY: CONTINUOUS

## 2019-01-19 ASSESSMENT — PAIN DESCRIPTION - ONSET
ONSET: ON-GOING
ONSET: ON-GOING

## 2019-01-19 ASSESSMENT — PAIN DESCRIPTION - ORIENTATION
ORIENTATION: RIGHT;POSTERIOR
ORIENTATION: RIGHT;POSTERIOR

## 2019-01-19 ASSESSMENT — PAIN DESCRIPTION - PAIN TYPE
TYPE: ACUTE PAIN

## 2019-01-19 NOTE — PROGRESS NOTES
[facility]        Progress Note    Patient Name: Rio iL  MRN: [id number]  YOB: 1945  Date of Evaluation: 2019    Chief Complaint: Back pain    Subjective:  No acute events overnight  Up to chair at this time  Continues to deny abdominal pain, back pain continues to improve - states 1000 times better  Mild intermittent nausea but no vomiting  Passing flatus and stool      Vital Signs:  Patient Vitals for the past 24 hrs:   BP Temp Temp src Pulse Resp SpO2 Weight   19 0957 - - - - - - 199 lb (90.3 kg)   19 0917 119/82 - - 70 16 93 % -   19 0615 107/68 97.3 °F (36.3 °C) Temporal 71 16 90 % -   19 2301 130/73 97.7 °F (36.5 °C) Oral 77 16 94 % -   19 1610 128/77 98 °F (36.7 °C) Oral 69 16 95 % -   19 1158 126/74 97.8 °F (36.6 °C) Oral 70 16 94 % -      TEMPERATURE HISTORY 24H: Temp (24hrs), Av.7 °F (36.5 °C), Min:97.3 °F (36.3 °C), Max:98 °F (36.7 °C)    BLOOD PRESSURE HISTORY: Systolic (86ZEU), KOI:321 , Min:107 , TRW:461    Diastolic (77JVW), AJD:24, Min:68, Max:82      Intake/Output:  I/O last 3 completed shifts: In: 3075 [P.O.:480; I.V.:733; IV Piggyback:50]  Out: -   No intake/output data recorded.   Drain/tube Output:       Physical Exam:  General: awake, alert, oriented to  person, place, time  Lungs: clear to auscultation  Heart: rrr  Abdomen: soft, nontender, nondistended, no masses or organomegaly   SKIN:  no bruising or bleeding and normal skin color, texture, turgor      Labs:  CBC:    Recent Labs      19   0505  19   0528  19   0442   WBC  9.7  9.0  8.1   HGB  10.6*  10.4*  10.5*   HCT  32.2*  31.7*  31.8*   PLT  185  180  184     BMP:    Recent Labs      19   0505  19   0528  19   0442   NA  142  143  141   K  3.5  3.4*  3.3*   CL  103  105  105   CO2  27  25  26   BUN  22*  21*  16   CREATININE  1.6*  1.4*  1.3*   GLUCOSE  85  84  159*     Hepatic:   No results for input(s): AST, ALT, ALB, BILITOT, ALKPHOS in the last 72 hours. Amylase: No results for input(s): AMYLASE in the last 72 hours. Lipase:   No results for input(s): LIPASE in the last 72 hours. Mag:      Recent Labs      01/17/19   0505  01/18/19   0528  01/19/19   0442   MG  2.10  2.10  2.20      Phos:     No results for input(s): PHOS in the last 72 hours. Coags:   Recent Labs      01/17/19   0505  01/18/19   0528  01/19/19   0442   INR  2.04*  2.02*  2.15*       Cultures:  Anaerobic culture  No results for input(s): LABANAE in the last 72 hours. Blood culture  No results for input(s): BC in the last 72 hours. Blood culture 2  No results for input(s): BLOODCULT2 in the last 72 hours. Body fluid culture  No results for input(s): BLOODCULT2 in the last 72 hours. Surgical culture  No results for input(s): CXSURG in the last 72 hours. Fecal occult  No results for input(s): OCCULTBLDFEC in the last 72 hours. Gram stain  No results for input(s): LABGRAM in the last 72 hours. Stool culture 1  No results for input(s): CXST in the last 72 hours. Stool culture 2  No results for input(s): STOOLCULT2 in the last 72 hours. Urine culture  No results for input(s): LABURIN in the last 72 hours. Wound abscess  No results for input(s): WNDABS in the last 72 hours. Pathology:  NA    Imaging:  I have personally reviewed the following films:    CT ABDOMEN PELVIS WO CONTRAST Additional Contrast? Oral [[id number]] Collected: 01/17/19 1148 Updated: 01/17/19 1159 Narrative:   EXAMINATION:  CT OF THE ABDOMEN AND PELVIS WITHOUT CONTRAST 1/17/2019 10:40 am    TECHNIQUE:  CT of the abdomen and pelvis was performed without the administration of  intravenous contrast. Multiplanar reformatted images are provided for review.   Dose modulation, iterative reconstruction, and/or weight based adjustment of  the mA/kV was utilized to reduce the radiation dose to as low as reasonably  achievable. COMPARISON:  01/14/2019, 10/01/2018    HISTORY:  ORDERING SYSTEM PROVIDED HISTORY: ABDOMINAL PAIN  TECHNOLOGIST PROVIDED HISTORY:  Additional Contrast?->Oral  Ordering Physician Provided Reason for Exam: ABDOMINAL PAINback pain,  pneumoperitoneum, pneumatosis followup  Acuity: Unknown  Type of Exam: Unknown    FINDINGS:  Lower Chest: Partially visualized heart is enlarged. Lower lungs appear  clear. Organs: Unenhanced liver, spleen, pancreas, adrenal glands, and kidneys  appear unremarkable. Gallbladder surgically absent. GI/Bowel: No bowel obstruction. Compared to the previous study, there is  interval decrease in amount of small bowel pneumatosis. No significant bowel  wall thickening of the small or large bowel. Pelvis: Urinary bladder and uterus are unremarkable. Redemonstration of a  8.1 x 5.8 cm AP/TR hypodense lesion involving the left ovary. Peritoneum/Retroperitoneum: Interval decrease in amount of intraperitoneal  air with scattered amounts mesenteric gas stool present. No free fluid. No  lymphadenopathy by size criteria. Vascular: Diffuse atherosclerotic calcification of the abdominal aorta and  branching vessels. Bones/Soft Tissues: Degenerative changes of the lumbar spine. Impression:   Interval decrease in extent of small bowel pneumatosis and intraperitoneal  air. Etiology of this is again unclear in may be benign. Redemonstration of an 8.1 cm x 5.8 cm cystic lesion appearing to arise from  the left ovary. Given the patient's age, ovarian cystic neoplasm is again  suspected. MRI of the pelvis with contrast or surgical consultation is  recommended. Ct Abdomen Pelvis Wo Contrast    Result Date: 1/16/2019  EXAMINATION: CT OF THE ABDOMEN AND PELVIS WITHOUT CONTRAST 1/14/2019 8:30 pm TECHNIQUE: CT of the abdomen and pelvis was performed without the administration of intravenous contrast. Multiplanar reformatted images are provided for review. Dose modulation, iterative reconstruction, and/or weight based adjustment of the mA/kV was utilized to reduce the radiation dose to as low as reasonably achievable. COMPARISON: 10/01/2018 HISTORY: ORDERING SYSTEM PROVIDED HISTORY: possible bowel perf TECHNOLOGIST PROVIDED HISTORY: Ordering Physician Provided Reason for Exam: possible bowel perf Acuity: Unknown Type of Exam: Unknown FINDINGS: Lower Chest: The heart size is enlarged. There is extensive coronary calcification. The patient is status post sternotomy. There is a small hiatal hernia. There are numerous noncalcified pulmonary nodules at the lung bases, the largest measuring 4 mm. These appears similar to the prior exam. Organs: No focal hepatic abnormality is identified. The adrenal glands have been removed. Splenic calcifications are noted. No focal pancreatic abnormality is identified. The adrenal glands are within normal limits. The kidneys are not obstructed. There is no urinary tract calcification. GI/Bowel: The bowel is not obstructed. The appendix is within normal limits. Diverticulosis is present. There is no evidence of diverticulitis. Duodenal diverticula are noted. Pelvis: There is no free fluid in the pelvis. There is a large left adnexal cyst measuring 7.5 cm, unchanged. The urinary bladder is unremarkable. Peritoneum/Retroperitoneum: Multifocal free intraperitoneal air is identified. Pneumatosis is noted involving small bowel loops in the left abdomen. Pneumatosis was noted on the prior exam. Atherosclerosis is noted with extensive splenic arterial calcifications. Bones/Soft Tissues: Degenerative changes noted throughout the spine. There is no acute osseous abnormality. 1. Free intraperitoneal air. Small bowel pneumatosis. Bowel ischemia and perforation should be excluded. In asymptomatic patient, benign pneumatosis intestinalis is a consideration.   Note is made that the patient has demonstrated pneumatosis and free air on prior examinations. 2. Extensive atherosclerosis. 3. There is a 7.5 cm simple left adnexal cystic lesion, not changed since 10/01/2018. Please see below for management recommendations. This report was discussed with Dr. Cassia Aguirre at 9:11 p.m. on 01/14/2019. RECOMMENDATIONS: Recommend follow-up pelvic MRI with IV contrast or surgical evaluation. Reference: J Am An Radiol 2013;10:675-681       Scheduled Meds:   potassium bicarb-citric acid  40 mEq Oral Daily    insulin glargine  35 Units Subcutaneous Daily    warfarin  2.5 mg Oral Daily    aspirin  81 mg Oral Daily    piperacillin-tazobactam  3.375 g Intravenous Q8H    sodium chloride flush  10 mL Intravenous 2 times per day    montelukast  10 mg Oral Nightly    rosuvastatin  20 mg Oral Daily    carvedilol  3.125 mg Oral BID WC    digoxin  125 mcg Oral Daily    pantoprazole  40 mg Oral QAM AC    insulin lispro  0-12 Units Subcutaneous TID WC    insulin lispro  0-6 Units Subcutaneous Nightly     Continuous Infusions:   sodium chloride 35 mL/hr at 01/19/19 1023    dextrose       PRN Meds:.potassium chloride **OR** potassium bicarb-citric acid **OR** potassium chloride, HYDROmorphone, ipratropium-albuterol, sodium chloride flush, magnesium hydroxide, ondansetron, oxyCODONE, glucose, dextrose, glucagon (rDNA), dextrose      Assessment:  68 y.o. female admitted with   1. Intra-abdominal free air of unknown etiology    2. Pneumatosis intestinalis    3. Acute right-sided thoracic back pain        Pneumoperitoneum  Pneumatosis intestinalis, small bowel  Arteriolosclerosis  Atrial fibrillation on Coumadin  Chronic kidney disease  CAD, s/p CABG      Plan:  DOING WELL  FULLS PO  MONITOR CBC AND EXAM AS DIET ADVANCED    EDUCATION:  Educated patient on plan of care and disease process--all questions answered. Plans discussed with patient and nursing.         Devante Sparrow    1/19/2019  10:53 AM

## 2019-01-19 NOTE — PROGRESS NOTES
63   Temp 97.6 °F (36.4 °C) (Temporal)   Resp 16   Ht 5' 8\" (1.727 m)   Wt 199 lb (90.3 kg)   SpO2 95%   BMI 30.26 kg/m²      Intake/Output Summary (Last 24 hours) at 01/19/19 1443  Last data filed at 01/19/19 1313   Gross per 24 hour   Intake             1563 ml   Output                0 ml   Net             1563 ml    Wt Readings from Last 3 Encounters:   01/19/19 199 lb (90.3 kg)   01/11/19 210 lb 3.2 oz (95.3 kg)   12/28/18 211 lb 3.2 oz (95.8 kg)       General appearance:  Appears comfortable  Eyes: Sclera clear. Pupils equal.  ENT: Moist oral mucosa. Trachea midline, no adenopathy. Cardiovascular: Regular rhythm, normal S1, S2. No murmur. No edema in lower extremities  Respiratory: Not using accessory muscles. Good inspiratory effort. Clear to auscultation bilaterally, no wheeze or crackles. GI: Abdomen soft, no tenderness, not distended, normal bowel sounds  Musculoskeletal: No cyanosis in digits, neck supple  Neurology: CN 2-12 grossly intact. No speech or motor deficits  Psych: Normal affect. Alert and oriented in time, place and person  Skin: Warm, dry, normal turgor    Labs and Tests:  CBC:   Recent Labs      01/17/19   0505  01/18/19   0528  01/19/19   0442   WBC  9.7  9.0  8.1   HGB  10.6*  10.4*  10.5*   PLT  185  180  184     BMP:  Recent Labs      01/17/19   0505  01/18/19   0528  01/19/19   0442   NA  142  143  141   K  3.5  3.4*  3.3*   CL  103  105  105   CO2  27  25  26   BUN  22*  21*  16   CREATININE  1.6*  1.4*  1.3*   GLUCOSE  85  84  159*     CT abd/pelvis 1/17  Interval decrease in extent of small bowel pneumatosis and intraperitoneal   air.  Etiology of this is again unclear in may be benign.       Redemonstration of an 8.1 cm x 5.8 cm cystic lesion appearing to arise from   the left ovary.  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  MRI of the pelvis with contrast or surgical consultation is   recommended. CT abd/pelvis 1/14  1.  Free intraperitoneal air.  Small bowel pneumatosis.  Bowel ischemia and   perforation should be excluded.  In asymptomatic patient, benign pneumatosis   intestinalis is a consideration.  Note is made that the patient has   demonstrated pneumatosis and free air on prior examinations. 2. Extensive atherosclerosis. 3. There is a 7.5 cm simple left adnexal cystic lesion, not changed since   10/01/2018.  Please see below for management recommendations. 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 *       Assessment & Plan:   1. Small bowel pneumatosis/pneumoperitoneum: ? Etiology-2nd episode. Discussed with surgery-advance diet to fulls.      2. CAD (coronary artery disease) -  Beta Blocker, Statin and Coumadin     3. Atrial fibrillation (HCC) - rate controlled; continue Digoxin, Coreg. INR 2. Now holding Coumadin in case need for surgery arises. Follow-up INR daily.      4. Hypokalemia-replace    5. Chronic combined systolic (congestive) and diastolic (congestive) heart failure (HCC) - compensated. Hold diuretics for now.      6. Diabetes mellitus II -continue lantus 35 and SSI.      7. CKD-creat 1.3. stale      Diet: DIET FULL LIQUID;  Dietary Nutrition Supplements: Diabetic Oral Supplement  Code:Full Code  DVT PPX: miguel Dunn PA-C   1/19/2019 2:43 PM

## 2019-01-20 LAB
ANION GAP SERPL CALCULATED.3IONS-SCNC: 8 MMOL/L (ref 3–16)
BASOPHILS ABSOLUTE: 0 K/UL (ref 0–0.2)
BASOPHILS RELATIVE PERCENT: 0.4 %
BUN BLDV-MCNC: 13 MG/DL (ref 7–20)
CALCIUM SERPL-MCNC: 9 MG/DL (ref 8.3–10.6)
CHLORIDE BLD-SCNC: 108 MMOL/L (ref 99–110)
CO2: 27 MMOL/L (ref 21–32)
CREAT SERPL-MCNC: 1.1 MG/DL (ref 0.6–1.2)
EOSINOPHILS ABSOLUTE: 3.2 K/UL (ref 0–0.6)
EOSINOPHILS RELATIVE PERCENT: 45.7 %
GFR AFRICAN AMERICAN: 59
GFR NON-AFRICAN AMERICAN: 49
GLUCOSE BLD-MCNC: 118 MG/DL (ref 70–99)
GLUCOSE BLD-MCNC: 122 MG/DL (ref 70–99)
GLUCOSE BLD-MCNC: 143 MG/DL (ref 70–99)
GLUCOSE BLD-MCNC: 185 MG/DL (ref 70–99)
GLUCOSE BLD-MCNC: 189 MG/DL (ref 70–99)
GLUCOSE BLD-MCNC: 217 MG/DL (ref 70–99)
HCT VFR BLD CALC: 31.1 % (ref 36–48)
HEMOGLOBIN: 10.2 G/DL (ref 12–16)
INR BLD: 2.18 (ref 0.86–1.14)
LYMPHOCYTES ABSOLUTE: 1.3 K/UL (ref 1–5.1)
LYMPHOCYTES RELATIVE PERCENT: 17.6 %
MCH RBC QN AUTO: 28.2 PG (ref 26–34)
MCHC RBC AUTO-ENTMCNC: 32.9 G/DL (ref 31–36)
MCV RBC AUTO: 85.7 FL (ref 80–100)
MONOCYTES ABSOLUTE: 0.5 K/UL (ref 0–1.3)
MONOCYTES RELATIVE PERCENT: 7.2 %
NEUTROPHILS ABSOLUTE: 2.1 K/UL (ref 1.7–7.7)
NEUTROPHILS RELATIVE PERCENT: 29.1 %
PDW BLD-RTO: 16.7 % (ref 12.4–15.4)
PERFORMED ON: ABNORMAL
PLATELET # BLD: 165 K/UL (ref 135–450)
PMV BLD AUTO: 8.4 FL (ref 5–10.5)
POTASSIUM REFLEX MAGNESIUM: 3.9 MMOL/L (ref 3.5–5.1)
PROTHROMBIN TIME: 24.9 SEC (ref 9.8–13)
RBC # BLD: 3.63 M/UL (ref 4–5.2)
SODIUM BLD-SCNC: 143 MMOL/L (ref 136–145)
WBC # BLD: 7.1 K/UL (ref 4–11)

## 2019-01-20 PROCEDURE — 6370000000 HC RX 637 (ALT 250 FOR IP): Performed by: PHYSICIAN ASSISTANT

## 2019-01-20 PROCEDURE — 6370000000 HC RX 637 (ALT 250 FOR IP): Performed by: INTERNAL MEDICINE

## 2019-01-20 PROCEDURE — 2580000003 HC RX 258: Performed by: INTERNAL MEDICINE

## 2019-01-20 PROCEDURE — 2500000003 HC RX 250 WO HCPCS: Performed by: INTERNAL MEDICINE

## 2019-01-20 PROCEDURE — 80048 BASIC METABOLIC PNL TOTAL CA: CPT

## 2019-01-20 PROCEDURE — 2060000000 HC ICU INTERMEDIATE R&B

## 2019-01-20 PROCEDURE — 99232 SBSQ HOSP IP/OBS MODERATE 35: CPT | Performed by: SURGERY

## 2019-01-20 PROCEDURE — 85025 COMPLETE CBC W/AUTO DIFF WBC: CPT

## 2019-01-20 PROCEDURE — 36415 COLL VENOUS BLD VENIPUNCTURE: CPT

## 2019-01-20 PROCEDURE — 85610 PROTHROMBIN TIME: CPT

## 2019-01-20 RX ORDER — POTASSIUM CHLORIDE 750 MG/1
40 TABLET, FILM COATED, EXTENDED RELEASE ORAL DAILY
Status: DISCONTINUED | OUTPATIENT
Start: 2019-01-20 | End: 2019-01-21 | Stop reason: HOSPADM

## 2019-01-20 RX ADMIN — CARVEDILOL 3.12 MG: 3.12 TABLET, FILM COATED ORAL at 17:02

## 2019-01-20 RX ADMIN — MONTELUKAST SODIUM 10 MG: 10 TABLET, FILM COATED ORAL at 22:28

## 2019-01-20 RX ADMIN — DIGOXIN 125 MCG: 125 TABLET ORAL at 08:41

## 2019-01-20 RX ADMIN — INSULIN GLARGINE 35 UNITS: 100 INJECTION, SOLUTION SUBCUTANEOUS at 08:42

## 2019-01-20 RX ADMIN — TAZOBACTAM SODIUM AND PIPERACILLIN SODIUM 3.38 G: 375; 3 INJECTION, SOLUTION INTRAVENOUS at 06:19

## 2019-01-20 RX ADMIN — ASPIRIN 81 MG 81 MG: 81 TABLET ORAL at 08:41

## 2019-01-20 RX ADMIN — Medication 10 ML: at 08:42

## 2019-01-20 RX ADMIN — INSULIN LISPRO 4 UNITS: 100 INJECTION, SOLUTION INTRAVENOUS; SUBCUTANEOUS at 17:02

## 2019-01-20 RX ADMIN — INSULIN LISPRO 1 UNITS: 100 INJECTION, SOLUTION INTRAVENOUS; SUBCUTANEOUS at 22:32

## 2019-01-20 RX ADMIN — ROSUVASTATIN CALCIUM 20 MG: 10 TABLET, FILM COATED ORAL at 08:41

## 2019-01-20 RX ADMIN — POTASSIUM CHLORIDE 40 MEQ: 750 TABLET, FILM COATED, EXTENDED RELEASE ORAL at 08:57

## 2019-01-20 RX ADMIN — INSULIN LISPRO 2 UNITS: 100 INJECTION, SOLUTION INTRAVENOUS; SUBCUTANEOUS at 13:01

## 2019-01-20 RX ADMIN — CARVEDILOL 3.12 MG: 3.12 TABLET, FILM COATED ORAL at 08:41

## 2019-01-20 RX ADMIN — PANTOPRAZOLE SODIUM 40 MG: 40 TABLET, DELAYED RELEASE ORAL at 06:19

## 2019-01-20 RX ADMIN — OXYCODONE HYDROCHLORIDE 5 MG: 5 TABLET ORAL at 03:34

## 2019-01-20 RX ADMIN — WARFARIN SODIUM 5 MG: 5 TABLET ORAL at 17:02

## 2019-01-20 ASSESSMENT — PAIN DESCRIPTION - LOCATION
LOCATION: SHOULDER;HEAD
LOCATION: HEAD;SHOULDER
LOCATION: SHOULDER

## 2019-01-20 ASSESSMENT — PAIN SCALES - GENERAL
PAINLEVEL_OUTOF10: 0
PAINLEVEL_OUTOF10: 7
PAINLEVEL_OUTOF10: 3
PAINLEVEL_OUTOF10: 3
PAINLEVEL_OUTOF10: 4

## 2019-01-20 ASSESSMENT — PAIN DESCRIPTION - PAIN TYPE
TYPE: ACUTE PAIN

## 2019-01-20 ASSESSMENT — PAIN DESCRIPTION - DESCRIPTORS
DESCRIPTORS: ACHING
DESCRIPTORS: ACHING;DULL

## 2019-01-20 NOTE — PLAN OF CARE
Problem: Nutrition  Goal: Optimal nutrition therapy  Patient tolerating upgrade in diet. Patient has not had any complaint in nausea or abdominal pain this shift. Will continue to monitor comfort.

## 2019-01-20 NOTE — PLAN OF CARE
Problem: Falls - Risk of:  Goal: Will remain free from falls  Will remain free from falls   Outcome: Ongoing  Fall risk assessment completed. Pt gait steady. Clear pathway provided to and from bathroom as pt is ambulatory. Bed kept in lowest position with wheels locked. Call light within reach. Pt encouraged to call for any needs. Pt free from accidental injury this shift. Problem: Pain:  Goal: Pain level will decrease  Pain level will decrease   Outcome: Ongoing  Patient with continued complaint of pain to right shoulder blade. Oxycodone given as ordered x1 this shift. Will continue to monitor comfort. Problem: FLUID AND ELECTROLYTE IMBALANCE  Goal: Fluid and electrolyte balance are achieved/maintained  Outcome: Ongoing  Patient increased to Full Liquid diet. Patient tolerating increase in diet. Will continue to monitor fluid and electrolytes.

## 2019-01-20 NOTE — PROGRESS NOTES
[facility]        Progress Note    Patient Name: Joe Hutson  MRN: [id number]  YOB: 1945  Date of Evaluation: 2019    Chief Complaint: Back pain    Subjective:  No acute events overnight, felt bad after tomato soup, now back to basrline  Up to chair at this time  Continues to deny abdominal pain  Mild intermittent nausea but no vomiting  Passing flatus and stool      Vital Signs:  Patient Vitals for the past 24 hrs:   BP Temp Temp src Pulse Resp SpO2 Weight   19 0857 - - - - - - 200 lb (90.7 kg)   19 0840 108/70 97.7 °F (36.5 °C) Temporal 62 16 93 % -   19 0247 106/67 97.7 °F (36.5 °C) Oral 68 16 94 % -   19 2041 134/78 97.6 °F (36.4 °C) Oral 64 16 96 % -   19 1800 132/85 97.6 °F (36.4 °C) Temporal 69 16 95 % -   19 1313 117/70 97.6 °F (36.4 °C) Temporal 63 16 95 % -      TEMPERATURE HISTORY 24H: Temp (24hrs), Av.6 °F (36.4 °C), Min:97.6 °F (36.4 °C), Max:97.7 °F (36.5 °C)    BLOOD PRESSURE HISTORY: Systolic (73OEH), ARE:453 , Min:106 , HPI:996    Diastolic (37KHV), WFT:08, Min:67, Max:85      Intake/Output:  I/O last 3 completed shifts: In: 1260 [P.O.:1260]  Out: -   No intake/output data recorded.   Drain/tube Output:       Physical Exam:  General: awake, alert, oriented to  person, place, time  Lungs: clear to auscultation  Heart: rrr  Abdomen: soft, nontender, nondistended, no masses or organomegaly   SKIN:  no bruising or bleeding and normal skin color, texture, turgor      Labs:  CBC:    Recent Labs      19   0528  19   0442  19   0521   WBC  9.0  8.1  7.1   HGB  10.4*  10.5*  10.2*   HCT  31.7*  31.8*  31.1*   PLT  180  184  165     BMP:    Recent Labs      19   0528  19   0442  19   0521   NA  143  141  143   K  3.4*  3.3*  3.9   CL  105  105  108   CO2  25  26  27   BUN  21*  16  13   CREATININE  1.4*  1.3*  1.1   GLUCOSE  84  159*  118*     Hepatic:   No results for input(s): AST, ALT, ALB, BILITOT, ALKPHOS in the last 72 hours. Amylase: No results for input(s): AMYLASE in the last 72 hours. Lipase:   No results for input(s): LIPASE in the last 72 hours. Mag:      Recent Labs      01/18/19   0528  01/19/19   0442   MG  2.10  2.20      Phos:     No results for input(s): PHOS in the last 72 hours. Coags:   Recent Labs      01/18/19   0528  01/19/19   0442  01/20/19   0521   INR  2.02*  2.15*  2.18*       Cultures:  Anaerobic culture  No results for input(s): LABANAE in the last 72 hours. Blood culture  No results for input(s): BC in the last 72 hours. Blood culture 2  No results for input(s): BLOODCULT2 in the last 72 hours. Body fluid culture  No results for input(s): BLOODCULT2 in the last 72 hours. Surgical culture  No results for input(s): CXSURG in the last 72 hours. Fecal occult  No results for input(s): OCCULTBLDFEC in the last 72 hours. Gram stain  No results for input(s): LABGRAM in the last 72 hours. Stool culture 1  No results for input(s): CXST in the last 72 hours. Stool culture 2  No results for input(s): STOOLCULT2 in the last 72 hours. Urine culture  No results for input(s): LABURIN in the last 72 hours. Wound abscess  No results for input(s): WNDABS in the last 72 hours. Pathology:  NA    Imaging:  I have personally reviewed the following films:    CT ABDOMEN PELVIS WO CONTRAST Additional Contrast? Oral [[id number]] Collected: 01/17/19 1148 Updated: 01/17/19 1159 Narrative:   EXAMINATION:  CT OF THE ABDOMEN AND PELVIS WITHOUT CONTRAST 1/17/2019 10:40 am    TECHNIQUE:  CT of the abdomen and pelvis was performed without the administration of  intravenous contrast. Multiplanar reformatted images are provided for review. Dose modulation, iterative reconstruction, and/or weight based adjustment of  the mA/kV was utilized to reduce the radiation dose to as low as reasonably  achievable.     COMPARISON:  01/14/2019, 10/01/2018    HISTORY:  ORDERING SYSTEM PROVIDED HISTORY: ABDOMINAL PAIN  TECHNOLOGIST PROVIDED HISTORY:  Additional Contrast?->Oral  Ordering Physician Provided Reason for Exam: ABDOMINAL PAINback pain,  pneumoperitoneum, pneumatosis followup  Acuity: Unknown  Type of Exam: Unknown    FINDINGS:  Lower Chest: Partially visualized heart is enlarged. Lower lungs appear  clear. Organs: Unenhanced liver, spleen, pancreas, adrenal glands, and kidneys  appear unremarkable. Gallbladder surgically absent. GI/Bowel: No bowel obstruction. Compared to the previous study, there is  interval decrease in amount of small bowel pneumatosis. No significant bowel  wall thickening of the small or large bowel. Pelvis: Urinary bladder and uterus are unremarkable. Redemonstration of a  8.1 x 5.8 cm AP/TR hypodense lesion involving the left ovary. Peritoneum/Retroperitoneum: Interval decrease in amount of intraperitoneal  air with scattered amounts mesenteric gas stool present. No free fluid. No  lymphadenopathy by size criteria. Vascular: Diffuse atherosclerotic calcification of the abdominal aorta and  branching vessels. Bones/Soft Tissues: Degenerative changes of the lumbar spine. Impression:   Interval decrease in extent of small bowel pneumatosis and intraperitoneal  air. Etiology of this is again unclear in may be benign. Redemonstration of an 8.1 cm x 5.8 cm cystic lesion appearing to arise from  the left ovary. Given the patient's age, ovarian cystic neoplasm is again  suspected. MRI of the pelvis with contrast or surgical consultation is  recommended. Ct Abdomen Pelvis Wo Contrast    Result Date: 1/16/2019  EXAMINATION: CT OF THE ABDOMEN AND PELVIS WITHOUT CONTRAST 1/14/2019 8:30 pm TECHNIQUE: CT of the abdomen and pelvis was performed without the administration of intravenous contrast. Multiplanar reformatted images are provided for review.  Dose modulation, iterative reconstruction, and/or weight based adjustment of the mA/kV was utilized to reduce the radiation dose to as low as reasonably achievable. COMPARISON: 10/01/2018 HISTORY: ORDERING SYSTEM PROVIDED HISTORY: possible bowel perf TECHNOLOGIST PROVIDED HISTORY: Ordering Physician Provided Reason for Exam: possible bowel perf Acuity: Unknown Type of Exam: Unknown FINDINGS: Lower Chest: The heart size is enlarged. There is extensive coronary calcification. The patient is status post sternotomy. There is a small hiatal hernia. There are numerous noncalcified pulmonary nodules at the lung bases, the largest measuring 4 mm. These appears similar to the prior exam. Organs: No focal hepatic abnormality is identified. The adrenal glands have been removed. Splenic calcifications are noted. No focal pancreatic abnormality is identified. The adrenal glands are within normal limits. The kidneys are not obstructed. There is no urinary tract calcification. GI/Bowel: The bowel is not obstructed. The appendix is within normal limits. Diverticulosis is present. There is no evidence of diverticulitis. Duodenal diverticula are noted. Pelvis: There is no free fluid in the pelvis. There is a large left adnexal cyst measuring 7.5 cm, unchanged. The urinary bladder is unremarkable. Peritoneum/Retroperitoneum: Multifocal free intraperitoneal air is identified. Pneumatosis is noted involving small bowel loops in the left abdomen. Pneumatosis was noted on the prior exam. Atherosclerosis is noted with extensive splenic arterial calcifications. Bones/Soft Tissues: Degenerative changes noted throughout the spine. There is no acute osseous abnormality. 1. Free intraperitoneal air. Small bowel pneumatosis. Bowel ischemia and perforation should be excluded. In asymptomatic patient, benign pneumatosis intestinalis is a consideration. Note is made that the patient has demonstrated pneumatosis and free air on prior examinations.  2. Extensive

## 2019-01-20 NOTE — PROGRESS NOTES
[facility]    1/20/2019 11:00 AM        Name: Lakesha Hurtado . Admitted: 1/14/2019  Primary Care Provider: Cassy Shearer MD (Tel: [phone])      Subjective:  . Doing well, had some abdominal pain and nausea after eating tomato soup for dinner. She did ok with lunch yesterday. Denies pain at present time.      Reviewed interval ancillary notes    Current Medications    potassium chloride (KLOR-CON) extended release tablet 40 mEq Daily   potassium chloride (KLOR-CON M) extended release tablet 40 mEq PRN   Or    potassium bicarb-citric acid (EFFER-K) effervescent tablet 40 mEq PRN   Or    potassium chloride 10 mEq/100 mL IVPB (Peripheral Line) PRN   warfarin (COUMADIN) tablet 5 mg Daily   insulin glargine (LANTUS) injection pen 35 Units Daily   0.9 % sodium chloride infusion Continuous   aspirin chewable tablet 81 mg Daily   HYDROmorphone (DILAUDID) injection 0.5 mg Q4H PRN   ipratropium-albuterol (DUONEB) nebulizer solution 1 ampule Q4H PRN   piperacillin-tazobactam (ZOSYN) 3.375 g in dextrose 50 mL IVPB extended infusion (premix) Q8H   sodium chloride flush 0.9 % injection 10 mL 2 times per day   sodium chloride flush 0.9 % injection 10 mL PRN   magnesium hydroxide (MILK OF MAGNESIA) 400 MG/5ML suspension 30 mL Daily PRN   ondansetron (ZOFRAN) injection 4 mg Q6H PRN   oxyCODONE (ROXICODONE) immediate release tablet 5 mg Q6H PRN   montelukast (SINGULAIR) tablet 10 mg Nightly   rosuvastatin (CRESTOR) tablet 20 mg Daily   carvedilol (COREG) tablet 3.125 mg BID WC   digoxin (LANOXIN) tablet 125 mcg Daily   pantoprazole (PROTONIX) tablet 40 mg QAM AC   glucose (GLUTOSE) 40 % oral gel 15 g PRN   dextrose 50 % solution 12.5 g PRN   glucagon (rDNA) injection 1 mg PRN   dextrose 5 % solution PRN   insulin lispro (HUMALOG) injection pen 0-12 Units TID WC   insulin lispro (HUMALOG) injection pen 0-6 Units Nightly       Objective:  /70   Pulse 62   Temp 97.7 °F (36.5 °C) (Temporal)   Resp 16   Ht 5' 8\" (1.727 m)   Wt 200 lb (90.7 kg)   SpO2 93%   BMI 30.41 kg/m²     Intake/Output Summary (Last 24 hours) at 01/20/19 1100  Last data filed at 01/19/19 1800   Gross per 24 hour   Intake              840 ml   Output                0 ml   Net              840 ml           General appearance:  Appears comfortable  Eyes: Sclera clear. Pupils equal.  ENT: Moist oral mucosa. Trachea midline, no adenopathy. Cardiovascular: Regular rhythm, normal S1, S2. No murmur. No edema in lower extremities  Respiratory: Not using accessory muscles. Good inspiratory effort. Clear to auscultation bilaterally, no wheeze or crackles. GI: Abdomen soft, no tenderness, not distended, normal bowel sounds  Musculoskeletal: No cyanosis in digits, neck supple  Neurology: CN 2-12 grossly intact. No speech or motor deficits  Psych: Normal affect. Alert and oriented in time, place and person  Skin: Warm, dry, normal turgor    Labs and Tests:  CBC:   Recent Labs      01/18/19   0528  01/19/19   0442  01/20/19   0521   WBC  9.0  8.1  7.1   HGB  10.4*  10.5*  10.2*   PLT  180  184  165     BMP:    Recent Labs      01/18/19   0528  01/19/19   0442  01/20/19   0521   NA  143  141  143   K  3.4*  3.3*  3.9   CL  105  105  108   CO2  25  26  27   BUN  21*  16  13   CREATININE  1.4*  1.3*  1.1   GLUCOSE  84  159*  118*     CT abd/pelvis 1/17  Interval decrease in extent of small bowel pneumatosis and intraperitoneal   air.  Etiology of this is again unclear in may be benign.       Redemonstration of an 8.1 cm x 5.8 cm cystic lesion appearing to arise from   the left ovary.  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  MRI of the pelvis with contrast or surgical consultation is   recommended. CT abd/pelvis 1/14  1. Free intraperitoneal air.  Small bowel pneumatosis.  Bowel ischemia and   perforation should be excluded.  In asymptomatic patient, benign pneumatosis   intestinalis is a consideration.  Note is made that the patient has   demonstrated pneumatosis and free air on prior examinations. 2. Extensive atherosclerosis. 3. There is a 7.5 cm simple left adnexal cystic lesion, not changed since   10/01/2018.  Please see below for management recommendations. 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 *       Assessment & Plan:   1. Small bowel pneumatosis/pneumoperitoneum: ? Etiology-2nd episode. Discussed with surgery-advance diet today. Dc when ok with surgery.      2. CAD (coronary artery disease) -  Beta Blocker, Statin and Coumadin     3. Atrial fibrillation (HCC) - rate controlled; continue Digoxin, Coreg. INR 2. Now holding Coumadin in case need for surgery arises. Follow-up INR daily.      4. Diabetes mellitus II -continue lantus 35 and SSI. 5.Chronic combined systolic (congestive) and diastolic (congestive) heart failure (HCC) - compensated. Hold diuretics for now.      6.  CKD-creat 1.1. stale      Diet: DIET FULL LIQUID;  Dietary Nutrition Supplements: Diabetic Oral Supplement  Code:Full Code  DVT PPX: miguel Villafana PA-C   1/20/2019 11:00 AM

## 2019-01-21 ENCOUNTER — HOSPITAL ENCOUNTER (OUTPATIENT)
Dept: CARDIAC REHAB | Age: 74
Setting detail: THERAPIES SERIES
End: 2019-01-21
Payer: MEDICARE

## 2019-01-21 VITALS
BODY MASS INDEX: 30.92 KG/M2 | OXYGEN SATURATION: 95 % | WEIGHT: 204 LBS | RESPIRATION RATE: 14 BRPM | SYSTOLIC BLOOD PRESSURE: 119 MMHG | TEMPERATURE: 97.6 F | DIASTOLIC BLOOD PRESSURE: 75 MMHG | HEART RATE: 83 BPM | HEIGHT: 68 IN

## 2019-01-21 LAB
ANION GAP SERPL CALCULATED.3IONS-SCNC: 5 MMOL/L (ref 3–16)
BASOPHILS ABSOLUTE: 0 K/UL (ref 0–0.2)
BASOPHILS RELATIVE PERCENT: 0.4 %
BUN BLDV-MCNC: 14 MG/DL (ref 7–20)
CALCIUM SERPL-MCNC: 9.1 MG/DL (ref 8.3–10.6)
CHLORIDE BLD-SCNC: 111 MMOL/L (ref 99–110)
CO2: 28 MMOL/L (ref 21–32)
CREAT SERPL-MCNC: 1.2 MG/DL (ref 0.6–1.2)
EOSINOPHILS ABSOLUTE: 3.5 K/UL (ref 0–0.6)
EOSINOPHILS RELATIVE PERCENT: 43.1 %
GFR AFRICAN AMERICAN: 53
GFR NON-AFRICAN AMERICAN: 44
GLUCOSE BLD-MCNC: 100 MG/DL (ref 70–99)
GLUCOSE BLD-MCNC: 126 MG/DL (ref 70–99)
GLUCOSE BLD-MCNC: 137 MG/DL (ref 70–99)
GLUCOSE BLD-MCNC: 143 MG/DL (ref 70–99)
HCT VFR BLD CALC: 32.6 % (ref 36–48)
HEMOGLOBIN: 10.5 G/DL (ref 12–16)
INR BLD: 2.29 (ref 0.86–1.14)
LYMPHOCYTES ABSOLUTE: 1.4 K/UL (ref 1–5.1)
LYMPHOCYTES RELATIVE PERCENT: 17.3 %
MCH RBC QN AUTO: 27.4 PG (ref 26–34)
MCHC RBC AUTO-ENTMCNC: 32.2 G/DL (ref 31–36)
MCV RBC AUTO: 85 FL (ref 80–100)
MONOCYTES ABSOLUTE: 0.5 K/UL (ref 0–1.3)
MONOCYTES RELATIVE PERCENT: 6 %
NEUTROPHILS ABSOLUTE: 2.7 K/UL (ref 1.7–7.7)
NEUTROPHILS RELATIVE PERCENT: 33.2 %
PDW BLD-RTO: 16.7 % (ref 12.4–15.4)
PERFORMED ON: ABNORMAL
PLATELET # BLD: 176 K/UL (ref 135–450)
PMV BLD AUTO: 8.4 FL (ref 5–10.5)
POTASSIUM REFLEX MAGNESIUM: 4.2 MMOL/L (ref 3.5–5.1)
PROTHROMBIN TIME: 26.1 SEC (ref 9.8–13)
RBC # BLD: 3.84 M/UL (ref 4–5.2)
SODIUM BLD-SCNC: 144 MMOL/L (ref 136–145)
WBC # BLD: 8.2 K/UL (ref 4–11)

## 2019-01-21 PROCEDURE — 85610 PROTHROMBIN TIME: CPT

## 2019-01-21 PROCEDURE — 80048 BASIC METABOLIC PNL TOTAL CA: CPT

## 2019-01-21 PROCEDURE — 36415 COLL VENOUS BLD VENIPUNCTURE: CPT

## 2019-01-21 PROCEDURE — 6370000000 HC RX 637 (ALT 250 FOR IP): Performed by: INTERNAL MEDICINE

## 2019-01-21 PROCEDURE — APPNB30 APP NON BILLABLE TIME 0-30 MINS: Performed by: NURSE PRACTITIONER

## 2019-01-21 PROCEDURE — 6370000000 HC RX 637 (ALT 250 FOR IP): Performed by: PHYSICIAN ASSISTANT

## 2019-01-21 PROCEDURE — APPSS15 APP SPLIT SHARED TIME 0-15 MINUTES: Performed by: NURSE PRACTITIONER

## 2019-01-21 PROCEDURE — 99231 SBSQ HOSP IP/OBS SF/LOW 25: CPT | Performed by: SURGERY

## 2019-01-21 PROCEDURE — 85025 COMPLETE CBC W/AUTO DIFF WBC: CPT

## 2019-01-21 RX ORDER — ASPIRIN 81 MG/1
81 TABLET, CHEWABLE ORAL DAILY
Qty: 30 TABLET | Refills: 3 | Status: SHIPPED | OUTPATIENT
Start: 2019-01-22 | End: 2019-06-07 | Stop reason: SDUPTHER

## 2019-01-21 RX ADMIN — CARVEDILOL 3.12 MG: 3.12 TABLET, FILM COATED ORAL at 09:04

## 2019-01-21 RX ADMIN — POTASSIUM CHLORIDE 40 MEQ: 750 TABLET, FILM COATED, EXTENDED RELEASE ORAL at 09:04

## 2019-01-21 RX ADMIN — ASPIRIN 81 MG 81 MG: 81 TABLET ORAL at 09:04

## 2019-01-21 RX ADMIN — DIGOXIN 125 MCG: 125 TABLET ORAL at 09:04

## 2019-01-21 RX ADMIN — PANTOPRAZOLE SODIUM 40 MG: 40 TABLET, DELAYED RELEASE ORAL at 08:36

## 2019-01-21 RX ADMIN — ROSUVASTATIN CALCIUM 20 MG: 10 TABLET, FILM COATED ORAL at 09:04

## 2019-01-21 RX ADMIN — INSULIN GLARGINE 35 UNITS: 100 INJECTION, SOLUTION SUBCUTANEOUS at 09:36

## 2019-01-21 ASSESSMENT — PAIN SCALES - GENERAL: PAINLEVEL_OUTOF10: 0

## 2019-01-21 NOTE — DISCHARGE SUMMARY
CHANGE how you take these medications    blood glucose test strips strip  Commonly known as:  FREESTYLE LITE  1 each by Does not apply route 4 times daily Patient to check blood sugar 4 times a day and PRN, she is treated with multiple daily injections of insulin that require correction dosing ;A1C 8.1 ; ICD code-E11.65 ,Z79.4  What changed:  · when to take this  · additional instructions     FREESTYLE LANCETS Misc  1 each by Does not apply route 4 times daily Patient to check blood sugar 4 times a day and PRN, she is treated with multiple daily injections of insulin that require correction dosing ;A1C 8.1 ; ICD code-E11.65 ,Z79.4  What changed:  · when to take this  · additional instructions     FREESTYLE LITE Mei  1 Device by Does not apply route 4 times daily Patient to check blood sugar 4 times a day and PRN, she is treated with multiple daily injections of insulin that require correction dosing ;A1C 8.1 ; ICD code-E11.65 ,Z79.4  What changed:  · when to take this  · additional instructions     warfarin 5 MG tablet  Commonly known as:  COUMADIN  Take as directed. If you are unsure how to take this medication, talk to your nurse or doctor. Original instructions:   Take 1 tablet by mouth daily Take 7.5 mg M W Fri, 5 mg on other days--inr with Dr Adama Kang  What changed:  additional instructions        CONTINUE taking these medications    albuterol sulfate  (90 Base) MCG/ACT inhaler  Commonly known as:  PROAIR HFA  Inhale 2 puffs into the lungs every 6 hours as needed for Wheezing  Notes to patient:  Use:bronchodilator, to open airways, rescue inhaler  Side effects: nervousness, jitteriness, shakiness, headache, fast heartbeat     carvedilol 3.125 MG tablet  Commonly known as:  COREG  Take 1 tablet by mouth 2 times daily (with meals)  Notes to patient:  Use: treat heart failure, prevent future heart attacks, lower blood pressure and heart rate, treat chest pain  Side effects: dizziness, fatigue, and diarrhea     cyanocobalamin 500 MCG tablet  Take 1 tablet by mouth daily  Notes to patient:  Use: treatment of anemia and/or vitamin B12 deficiency  Side effects: dizziness, headache, anxiety, nausea     digoxin 125 MCG tablet  Commonly known as:  LANOXIN  Take 1 tablet by mouth daily  Notes to patient:  Digoxin (Lanoxin)  Use: maintain normal hearth rhythm; decrease heart failure symptoms  Side effects: dizziness, upset stomach, diarrhea; vision changes     exenatide 10 MCG/0.04ML injection  Commonly known as:  BYETTA 10 MCG PEN  Inject 0.04 mLs into the skin 2 times daily (with meals)  Notes to patient:  Use: treatment for Diabetes  Side effects: irritation at the injection site     insulin glargine 100 UNIT/ML injection pen  Commonly known as:  LANTUS  Inject 45 Units into the skin every morning  Notes to patient:  Use: treats diabetes or high blood sugar. Long acting insulin  Side effects: Low blood sugar, irritation at the injection siteUse: treats diabetes or high blood sugar.  Long acting insulin  Side effects: Low blood sugar, irritation at the injection site     montelukast 10 MG tablet  Commonly known as:  SINGULAIR  TAKE 1 TABLET NIGHTLY  Notes to patient:  Use: for prevention of asthma attacks  Side effects: stomach upset, general body aches and flu-like symptoms, headache, chest tightness     nystatin 102465 UNIT/ML suspension  Commonly known as:  MYCOSTATIN  1 teaspoon 4 times daily x 5 d  Notes to patient:  Use: Treatment of fungal infection  Side effects: Stomach upset, naussea, diarrhea     omeprazole 40 MG delayed release capsule  Commonly known as:  PRILOSEC  Take 1 capsule by mouth every morning (before breakfast)  Notes to patient:  Use: prevention and treatment of gastric ulcers and/or heartburn  Side effects: headache, fatigue, constipation, dry mouth      ondansetron 4 MG tablet  Commonly known as:  ZOFRAN  Take 1 tablet by mouth 3 times daily as needed for Nausea or Vomiting  Notes to patient:  Use: nausea and vomiting  Side effects: headache, weakness or dizziness     oxyCODONE 5 MG immediate release tablet  Commonly known as:  ROXICODONE  Take 1 tablet by mouth every 6 hours as needed for Pain for up to 30 days. .  Notes to patient:  Use: pain. Side effects: may cause upset stomach and constipation     polyethylene glycol powder  Commonly known as:  GLYCOLAX  FILL DOSING CUP TO MARKED LINE (16 GRAMS) THEN MIX IN LIQUID AND DRINK DAILY  Notes to patient:  Use: to treat or prevent constipation  Side effects: nausea, abdominal bloating, diarrhea, flatulence      potassium chloride 10 MEQ extended release tablet  Commonly known as:  KLOR-CON M  Take 1 tablet by mouth daily  Notes to patient:  Potassium chloride/ Klor-Con  Use: restore potassium in your body  Side effects: stomach upset     rosuvastatin 20 MG tablet  Commonly known as:  CRESTOR  Take 1 tablet by mouth daily  Notes to patient:  Use:  Cholesterol reduction  Side effects:  Muscle pain or soreness, stomach and intestinal upset     spironolactone 25 MG tablet  Commonly known as:  ALDACTONE  Take 1 tablet by mouth daily  Notes to patient:  Use: treat heart failure, fluid retention, lower blood pressure. Side effects: frequent urination, weakness, muscle cramps, increased sensitivity to light, nausea, and dizziness     torsemide 20 MG tablet  Commonly known as:  DEMADEX  Take 1 tablet by mouth daily  Notes to patient:  Use: treat heart failure, fluid retention, lower blood pressure. Side effects: frequent urination, weakness, muscle cramps, increased sensitivity to light, nausea and dizziness. Where to Get Your Medications      You can get these medications from any pharmacy    Bring a paper prescription for each of these medications  · aspirin 81 MG chewable tablet         Discharge recommendations given to patient. Follow Up. in 1 week   Disposition. Home   Activity.  As tolerated   Diet: Dietary Nutrition Supplements: Diabetic Oral Supplement  DIET LOW FIBER;      Spent > 30  minutes in discharge process.     Signed:  SOLEDAD Weaver - JOSE L     1/21/2019 2:17 PM

## 2019-01-21 NOTE — PROGRESS NOTES
Nutrition Assessment (Low Risk)    Type and Reason for Visit: Reassess    Nutrition Recommendations:   1. Continue diet   2. Continue supp    Nutrition Assessment:  Patient assessed for nutritional risk. Deemed to be at low risk at this time. Will continue to monitor for changes in status. Pt eating well. Pt consuming % at meals and supplement. Nausea has resolved. Pt tolerating low fiber diet. No needs at this time. Pt is nutritionally stable at this time.      Malnutrition Assessment:  · Malnutrition Status: No malnutrition    Nutrition Risk Level   Risk Level: Low    Nutrition Diagnosis:   · Problem: No nutrition diagnosis at this time  · Etiology: Insufficient energy/nutrient consumption    Signs and symptoms: Diet history of poor intake    Nutrition Intervention:  Food and/or Delivery: Continue current diet, Continue current ONS  Nutrition Education/Counseling/Coordination of Care:  Continued Inpatient Monitoring, Education Not Indicated      Electronically signed by Sophia Sampson RD, CNSC, LD on 1/21/19 at 2:05 PM    Contact Number: [phone]

## 2019-01-21 NOTE — PLAN OF CARE
Problem: Falls - Risk of:  Goal: Will remain free from falls  Will remain free from falls   Outcome: Met This Shift  Pt remains free from falls and accidental injury at this time. Bed in lowest position with wheels locked. Non skid footwear on feet. Pt instructed to call out for needs, verbalizes understanding. Call light within reach. Bed alarm activated. Will cont to monitor.  Papa Lew RN

## 2019-01-21 NOTE — PROGRESS NOTES
[facility]        Progress Note    Patient Name: Sydney Rebollar  MRN: [id number]  YOB: 1945  Date of Evaluation: 2019    Chief Complaint: Back pain    Subjective:  No acute events overnight  Up to chair at this time  Continues to deny abdominal pain  No complaints of nausea or vomiting, tolerating low residue diet  Passing flatus and stool      Vital Signs:  Patient Vitals for the past 24 hrs:   BP Temp Temp src Pulse Resp SpO2 Weight   19 0900 119/75 - Temporal 83 14 95 % -   19 0716 - - - - - - 204 lb (92.5 kg)   19 0214 127/84 97.6 °F (36.4 °C) Temporal 69 14 93 % -   19 2045 122/65 97.8 °F (36.6 °C) Temporal 72 14 94 % -   19 1702 128/73 97.6 °F (36.4 °C) Temporal 72 16 95 % -   19 1259 106/67 97.9 °F (36.6 °C) Temporal 63 16 97 % -      TEMPERATURE HISTORY 24H: Temp (24hrs), Av.7 °F (36.5 °C), Min:97.6 °F (36.4 °C), Max:97.9 °F (36.6 °C)    BLOOD PRESSURE HISTORY: Systolic (43IGC), BJM:779 , Min:106 , SUW:677    Diastolic (25WUE), DSV:13, Min:65, Max:84      Intake/Output:  I/O last 3 completed shifts: In: 840 [P.O.:840]  Out: -   No intake/output data recorded.   Drain/tube Output:       Physical Exam:  General: awake, alert, oriented to  person, place, time  Lungs: clear to auscultation  Abdomen: soft, nontender, nondistended, no masses or organomegaly   SKIN:  no bruising or bleeding and normal skin color, texture, turgor      Labs:  CBC:    Recent Labs      19   0442  19   0521  19   0453   WBC  8.1  7.1  8.2   HGB  10.5*  10.2*  10.5*   HCT  31.8*  31.1*  32.6*   PLT  184  165  176     BMP:    Recent Labs      19   0442  19   0521  19   0453   NA  141  143  144   K  3.3*  3.9  4.2   CL  105  108  111*   CO2  26  27  28   BUN  16  13  14   CREATININE  1.3*  1.1  1.2   GLUCOSE  159*  118*  126*     Hepatic:   No results for input(s): AST, ALT, ALB, BILITOT, ALKPHOS in the last 72 hours. Amylase: No results for input(s): AMYLASE in the last 72 hours. Lipase:   No results for input(s): LIPASE in the last 72 hours. Mag:      Recent Labs      01/19/19   0442   MG  2.20      Phos:     No results for input(s): PHOS in the last 72 hours. Coags:   Recent Labs      01/19/19   0442  01/20/19   0521  01/21/19   0453   INR  2.15*  2.18*  2.29*       Cultures:  Anaerobic culture  No results for input(s): LABANAE in the last 72 hours. Blood culture  No results for input(s): BC in the last 72 hours. Blood culture 2  No results for input(s): BLOODCULT2 in the last 72 hours. Body fluid culture  No results for input(s): BLOODCULT2 in the last 72 hours. Surgical culture  No results for input(s): CXSURG in the last 72 hours. Fecal occult  No results for input(s): OCCULTBLDFEC in the last 72 hours. Gram stain  No results for input(s): LABGRAM in the last 72 hours. Stool culture 1  No results for input(s): CXST in the last 72 hours. Stool culture 2  No results for input(s): STOOLCULT2 in the last 72 hours. Urine culture  No results for input(s): LABURIN in the last 72 hours. Wound abscess  No results for input(s): WNDABS in the last 72 hours. Pathology:  NA    Imaging:  I have personally reviewed the following films:    CT ABDOMEN PELVIS WO CONTRAST Additional Contrast? Oral [[id number]] Collected: 01/17/19 1148 Updated: 01/17/19 1159 Narrative:   EXAMINATION:  CT OF THE ABDOMEN AND PELVIS WITHOUT CONTRAST 1/17/2019 10:40 am    TECHNIQUE:  CT of the abdomen and pelvis was performed without the administration of  intravenous contrast. Multiplanar reformatted images are provided for review. Dose modulation, iterative reconstruction, and/or weight based adjustment of  the mA/kV was utilized to reduce the radiation dose to as low as reasonably  achievable.     COMPARISON:  01/14/2019, 10/01/2018    HISTORY:  ORDERING SYSTEM PROVIDED HISTORY: ABDOMINAL PAIN  TECHNOLOGIST PROVIDED HISTORY:  Additional Contrast?->Oral  Ordering Physician Provided Reason for Exam: ABDOMINAL PAINback pain,  pneumoperitoneum, pneumatosis followup  Acuity: Unknown  Type of Exam: Unknown    FINDINGS:  Lower Chest: Partially visualized heart is enlarged. Lower lungs appear  clear. Organs: Unenhanced liver, spleen, pancreas, adrenal glands, and kidneys  appear unremarkable. Gallbladder surgically absent. GI/Bowel: No bowel obstruction. Compared to the previous study, there is  interval decrease in amount of small bowel pneumatosis. No significant bowel  wall thickening of the small or large bowel. Pelvis: Urinary bladder and uterus are unremarkable. Redemonstration of a  8.1 x 5.8 cm AP/TR hypodense lesion involving the left ovary. Peritoneum/Retroperitoneum: Interval decrease in amount of intraperitoneal  air with scattered amounts mesenteric gas stool present. No free fluid. No  lymphadenopathy by size criteria. Vascular: Diffuse atherosclerotic calcification of the abdominal aorta and  branching vessels. Bones/Soft Tissues: Degenerative changes of the lumbar spine. Impression:   Interval decrease in extent of small bowel pneumatosis and intraperitoneal  air. Etiology of this is again unclear in may be benign. Redemonstration of an 8.1 cm x 5.8 cm cystic lesion appearing to arise from  the left ovary. Given the patient's age, ovarian cystic neoplasm is again  suspected. MRI of the pelvis with contrast or surgical consultation is  recommended. Ct Abdomen Pelvis Wo Contrast    Result Date: 1/16/2019  EXAMINATION: CT OF THE ABDOMEN AND PELVIS WITHOUT CONTRAST 1/14/2019 8:30 pm TECHNIQUE: CT of the abdomen and pelvis was performed without the administration of intravenous contrast. Multiplanar reformatted images are provided for review.  Dose modulation, iterative reconstruction, and/or weight based adjustment of the mA/kV was utilized to reduce the radiation dose to as low as reasonably achievable. COMPARISON: 10/01/2018 HISTORY: ORDERING SYSTEM PROVIDED HISTORY: possible bowel perf TECHNOLOGIST PROVIDED HISTORY: Ordering Physician Provided Reason for Exam: possible bowel perf Acuity: Unknown Type of Exam: Unknown FINDINGS: Lower Chest: The heart size is enlarged. There is extensive coronary calcification. The patient is status post sternotomy. There is a small hiatal hernia. There are numerous noncalcified pulmonary nodules at the lung bases, the largest measuring 4 mm. These appears similar to the prior exam. Organs: No focal hepatic abnormality is identified. The adrenal glands have been removed. Splenic calcifications are noted. No focal pancreatic abnormality is identified. The adrenal glands are within normal limits. The kidneys are not obstructed. There is no urinary tract calcification. GI/Bowel: The bowel is not obstructed. The appendix is within normal limits. Diverticulosis is present. There is no evidence of diverticulitis. Duodenal diverticula are noted. Pelvis: There is no free fluid in the pelvis. There is a large left adnexal cyst measuring 7.5 cm, unchanged. The urinary bladder is unremarkable. Peritoneum/Retroperitoneum: Multifocal free intraperitoneal air is identified. Pneumatosis is noted involving small bowel loops in the left abdomen. Pneumatosis was noted on the prior exam. Atherosclerosis is noted with extensive splenic arterial calcifications. Bones/Soft Tissues: Degenerative changes noted throughout the spine. There is no acute osseous abnormality. 1. Free intraperitoneal air. Small bowel pneumatosis. Bowel ischemia and perforation should be excluded. In asymptomatic patient, benign pneumatosis intestinalis is a consideration. Note is made that the patient has demonstrated pneumatosis and free air on prior examinations. 2. Extensive atherosclerosis.  3. There is a 7.5 cm simple left adnexal cystic lesion, not changed since 10/01/2018. Please see below for management recommendations. This report was discussed with Dr. Abe Hanson at 9:11 p.m. on 01/14/2019. RECOMMENDATIONS: Recommend follow-up pelvic MRI with IV contrast or surgical evaluation. Reference: J Am An Radiol 2013;10:675-681       Scheduled Meds:   potassium chloride  40 mEq Oral Daily    warfarin  5 mg Oral Daily    insulin glargine  35 Units Subcutaneous Daily    aspirin  81 mg Oral Daily    sodium chloride flush  10 mL Intravenous 2 times per day    montelukast  10 mg Oral Nightly    rosuvastatin  20 mg Oral Daily    carvedilol  3.125 mg Oral BID WC    digoxin  125 mcg Oral Daily    pantoprazole  40 mg Oral QAM AC    insulin lispro  0-12 Units Subcutaneous TID WC    insulin lispro  0-6 Units Subcutaneous Nightly     Continuous Infusions:   dextrose       PRN Meds:.potassium chloride **OR** potassium bicarb-citric acid **OR** potassium chloride, HYDROmorphone, ipratropium-albuterol, sodium chloride flush, magnesium hydroxide, ondansetron, oxyCODONE, glucose, dextrose, glucagon (rDNA), dextrose      Assessment:  68 y.o. female admitted with   1. Intra-abdominal free air of unknown etiology    2. Pneumatosis intestinalis    3. Acute right-sided thoracic back pain        Pneumoperitoneum  Pneumatosis intestinalis, small bowel  Arteriolosclerosis  Atrial fibrillation on Coumadin  Chronic kidney disease  CAD, s/p CABG      Plan:  1. No plans for surgical intervention; abdominal exam remains benign and back pain is minimal, vitals/labs stable  2. Continue low residue diet as tolerated  3. Activity as tolerated  4. Pulmonary toilet, incentive spirometry  5. PRN analgesics and antiemetics--minimizing narcotics as tolerated  6. DVT prophylaxis defer to primary team; okay to resume Coumadin at this time  7. Management of medical comorbid etiologies per primary team and consulting services  8.  Disposition: Okay for discharge home from a surgical perspective; follow up with Dr. Jorge Luis Valdez as needed    EDUCATION:  Educated patient on plan of care and disease process--all questions answered. Plans discussed with patient and nursing. Reviewed and discussed with Dr. Jorge Luis Valdez. Signed:  Jacob April, APRN - CNP  1/21/2019 11:37 AM    I have personally performed a face to face diagnostic evaluation on this patient. I have interviewed and examined the patient and I agree with the assessment above. In summary, my findings and plan are the following:   Ms. Charlotte Curtis is a 68 y.o. female who presents with   Pneumoperitoneum  Pneumatosis intestinalis, small bowel  Arteriolosclerosis  Atrial fibrillation on Coumadin  Chronic kidney disease  CAD, s/p CABG    Plan:  1. Abdomen benign, does not need surgical exploration  2. Pain resolved  3. Tolerating diet  4. Antibiotics  5. May discharge from surgical standpoint and follow in office as needed after discharge      [address]  Jorge Luis Valdez MD, FACS  1/21/2019  5:07 PM

## 2019-01-21 NOTE — DISCHARGE INSTR - DIET
 Good nutrition is important when healing from an illness, injury, or surgery. Follow any nutrition recommendations given to you during your hospital stay.    Low residue diet

## 2019-01-21 NOTE — PLAN OF CARE
Problem: Pain:  Goal: Control of acute pain  Control of acute pain   Outcome: Ongoing  Assessment complete, VSS. Pt denies any complaints of acute pain at this time. Will continue to monitor.  Mabel Branch RN

## 2019-01-22 ENCOUNTER — CARE COORDINATION (OUTPATIENT)
Dept: CASE MANAGEMENT | Age: 74
End: 2019-01-22

## 2019-01-22 ENCOUNTER — OFFICE VISIT (OUTPATIENT)
Dept: CARDIOLOGY CLINIC | Age: 74
End: 2019-01-22
Payer: MEDICARE

## 2019-01-22 VITALS
WEIGHT: 211 LBS | HEIGHT: 68 IN | OXYGEN SATURATION: 98 % | BODY MASS INDEX: 31.98 KG/M2 | HEART RATE: 58 BPM | DIASTOLIC BLOOD PRESSURE: 66 MMHG | SYSTOLIC BLOOD PRESSURE: 122 MMHG

## 2019-01-22 DIAGNOSIS — I48.91 ATRIAL FIBRILLATION, UNSPECIFIED TYPE (HCC): Primary | ICD-10-CM

## 2019-01-22 DIAGNOSIS — R10.11 ABDOMINAL PAIN, RIGHT UPPER QUADRANT: Primary | ICD-10-CM

## 2019-01-22 DIAGNOSIS — Z95.0 PACEMAKER: ICD-10-CM

## 2019-01-22 PROCEDURE — 99213 OFFICE O/P EST LOW 20 MIN: CPT | Performed by: INTERNAL MEDICINE

## 2019-01-22 PROCEDURE — 1111F DSCHRG MED/CURRENT MED MERGE: CPT | Performed by: FAMILY MEDICINE

## 2019-01-23 ENCOUNTER — HOSPITAL ENCOUNTER (OUTPATIENT)
Dept: CARDIAC REHAB | Age: 74
Setting detail: THERAPIES SERIES
Discharge: HOME | End: 2019-01-23
Payer: MEDICARE

## 2019-01-23 PROCEDURE — 93798 PHYS/QHP OP CAR RHAB W/ECG: CPT

## 2019-01-24 ENCOUNTER — TELEPHONE (OUTPATIENT)
Dept: FAMILY MEDICINE CLINIC | Age: 74
End: 2019-01-24

## 2019-01-25 ENCOUNTER — ANTI-COAG VISIT (OUTPATIENT)
Dept: FAMILY MEDICINE CLINIC | Age: 74
End: 2019-01-25

## 2019-01-25 ENCOUNTER — HOSPITAL ENCOUNTER (OUTPATIENT)
Dept: CARDIAC REHAB | Age: 74
Setting detail: THERAPIES SERIES
Discharge: HOME | End: 2019-01-25
Payer: MEDICARE

## 2019-01-25 LAB
BASOPHILS ABSOLUTE: 51 CELLS/UL (ref 0–200)
BASOPHILS RELATIVE PERCENT: 0.5 %
BUN / CREAT RATIO: 15 (CALC) (ref 6–22)
BUN BLDV-MCNC: 19 MG/DL (ref 7–25)
CALCIUM SERPL-MCNC: 9.3 MG/DL (ref 8.6–10.4)
CHLORIDE BLD-SCNC: 106 MMOL/L (ref 98–110)
CO2: 31 MMOL/L (ref 20–32)
COMMENT: ABNORMAL
CREAT SERPL-MCNC: 1.28 MG/DL (ref 0.6–0.93)
EOSINOPHILS ABSOLUTE: 4141 CELLS/UL (ref 15–500)
EOSINOPHILS RELATIVE PERCENT: 40.6 %
GFR AFRICAN AMERICAN: 48 ML/MIN/1.73M2
GFR, ESTIMATED: 42 ML/MIN/1.73M2
GLUCOSE BLD-MCNC: 131 MG/DL (ref 65–139)
HCT VFR BLD CALC: 36 % (ref 35–45)
HEMOGLOBIN: 11.5 G/DL (ref 11.7–15.5)
IRON SATURATION: 15 % (CALC) (ref 11–50)
IRON: 44 MCG/DL (ref 45–160)
LYMPHOCYTES ABSOLUTE: 1459 CELLS/UL (ref 850–3900)
LYMPHOCYTES RELATIVE PERCENT: 14.3 %
MCH RBC QN AUTO: 27.4 PG (ref 27–33)
MCHC RBC AUTO-ENTMCNC: 31.9 G/DL (ref 32–36)
MCV RBC AUTO: 85.9 FL (ref 80–100)
MONOCYTES ABSOLUTE: 612 CELLS/UL (ref 200–950)
MONOCYTES RELATIVE PERCENT: 6 %
NEUTROPHILS ABSOLUTE: 3937 CELLS/UL (ref 1500–7800)
PDW BLD-RTO: 15.5 % (ref 11–15)
PLATELET # BLD: 233 THOUSAND/UL (ref 140–400)
PMV BLD AUTO: 11.2 FL (ref 7.5–12.5)
POTASSIUM SERPL-SCNC: 4.2 MMOL/L (ref 3.5–5.3)
RBC # BLD: 4.19 MILLION/UL (ref 3.8–5.1)
SEGMENTED NEUTROPHILS RELATIVE PERCENT: 38.6 %
SODIUM BLD-SCNC: 144 MMOL/L (ref 135–146)
TOTAL IRON BINDING CAPACITY: 292 MCG/DL (CALC) (ref 250–450)
WBC # BLD: 10.2 THOUSAND/UL (ref 3.8–10.8)

## 2019-01-25 PROCEDURE — 93798 PHYS/QHP OP CAR RHAB W/ECG: CPT

## 2019-01-28 ENCOUNTER — CARE COORDINATION (OUTPATIENT)
Dept: CASE MANAGEMENT | Age: 74
End: 2019-01-28

## 2019-01-28 ENCOUNTER — HOSPITAL ENCOUNTER (OUTPATIENT)
Dept: CARDIAC REHAB | Age: 74
Setting detail: THERAPIES SERIES
End: 2019-01-28
Payer: MEDICARE

## 2019-01-29 ENCOUNTER — OFFICE VISIT (OUTPATIENT)
Dept: FAMILY MEDICINE CLINIC | Age: 74
End: 2019-01-29
Payer: MEDICARE

## 2019-01-29 VITALS
HEART RATE: 73 BPM | RESPIRATION RATE: 16 BRPM | BODY MASS INDEX: 30.94 KG/M2 | WEIGHT: 200.5 LBS | SYSTOLIC BLOOD PRESSURE: 124 MMHG | DIASTOLIC BLOOD PRESSURE: 72 MMHG | OXYGEN SATURATION: 97 %

## 2019-01-29 DIAGNOSIS — K66.8 INTRA-ABDOMINAL FREE AIR OF UNKNOWN ETIOLOGY: Primary | ICD-10-CM

## 2019-01-29 DIAGNOSIS — N83.202 CYST OF LEFT OVARY: ICD-10-CM

## 2019-01-29 DIAGNOSIS — K52.81 EOSINOPHILIC GASTRITIS: ICD-10-CM

## 2019-01-29 DIAGNOSIS — I48.91 ATRIAL FIBRILLATION, UNSPECIFIED TYPE (HCC): ICD-10-CM

## 2019-01-29 DIAGNOSIS — Z95.3 S/P MITRAL VALVE REPLACEMENT WITH BIOPROSTHETIC VALVE: ICD-10-CM

## 2019-01-29 DIAGNOSIS — I50.42 CHRONIC COMBINED SYSTOLIC (CONGESTIVE) AND DIASTOLIC (CONGESTIVE) HEART FAILURE (HCC): ICD-10-CM

## 2019-01-29 DIAGNOSIS — R10.11 ABDOMINAL PAIN, RIGHT UPPER QUADRANT: ICD-10-CM

## 2019-01-29 PROCEDURE — 99495 TRANSJ CARE MGMT MOD F2F 14D: CPT | Performed by: FAMILY MEDICINE

## 2019-01-29 RX ORDER — POLYETHYLENE GLYCOL 3350 17 G/17G
POWDER, FOR SOLUTION ORAL
Qty: 1581 G | Refills: 3 | Status: SHIPPED | OUTPATIENT
Start: 2019-01-29 | End: 2019-03-01 | Stop reason: SDUPTHER

## 2019-01-29 RX ORDER — CARVEDILOL 3.12 MG/1
3.12 TABLET ORAL 2 TIMES DAILY WITH MEALS
Qty: 180 TABLET | Refills: 1 | Status: SHIPPED | OUTPATIENT
Start: 2019-01-29 | End: 2019-06-07 | Stop reason: SDUPTHER

## 2019-01-29 RX ORDER — PREDNISONE 10 MG/1
10 TABLET ORAL DAILY
COMMUNITY
End: 2019-02-08 | Stop reason: ALTCHOICE

## 2019-01-29 RX ORDER — DIGOXIN 125 MCG
125 TABLET ORAL DAILY
Qty: 90 TABLET | Refills: 1 | Status: SHIPPED | OUTPATIENT
Start: 2019-01-29 | End: 2019-06-07 | Stop reason: SDUPTHER

## 2019-01-30 ENCOUNTER — APPOINTMENT (OUTPATIENT)
Dept: CARDIAC REHAB | Age: 74
End: 2019-01-30
Payer: MEDICARE

## 2019-02-01 ENCOUNTER — HOSPITAL ENCOUNTER (OUTPATIENT)
Dept: CARDIAC REHAB | Age: 74
Setting detail: THERAPIES SERIES
Discharge: HOME | End: 2019-02-01
Payer: MEDICARE

## 2019-02-01 LAB — INR BLD: 3.3

## 2019-02-01 PROCEDURE — 93798 PHYS/QHP OP CAR RHAB W/ECG: CPT

## 2019-02-02 ENCOUNTER — ANTI-COAG VISIT (OUTPATIENT)
Dept: FAMILY MEDICINE CLINIC | Age: 74
End: 2019-02-02

## 2019-02-02 ENCOUNTER — TELEPHONE (OUTPATIENT)
Dept: FAMILY MEDICINE CLINIC | Age: 74
End: 2019-02-02

## 2019-02-04 ENCOUNTER — HOSPITAL ENCOUNTER (OUTPATIENT)
Dept: CARDIAC REHAB | Age: 74
Setting detail: THERAPIES SERIES
Discharge: HOME | End: 2019-02-04
Payer: MEDICARE

## 2019-02-04 ENCOUNTER — CARE COORDINATION (OUTPATIENT)
Dept: CASE MANAGEMENT | Age: 74
End: 2019-02-04

## 2019-02-04 PROCEDURE — 93798 PHYS/QHP OP CAR RHAB W/ECG: CPT

## 2019-02-05 ENCOUNTER — CARE COORDINATION (OUTPATIENT)
Dept: CARE COORDINATION | Age: 74
End: 2019-02-05

## 2019-02-05 ENCOUNTER — TELEPHONE (OUTPATIENT)
Dept: INPATIENT UNIT | Age: 74
End: 2019-02-05

## 2019-02-05 ENCOUNTER — NURSE ONLY (OUTPATIENT)
Dept: CARDIOLOGY CLINIC | Age: 74
End: 2019-02-05
Payer: MEDICARE

## 2019-02-05 DIAGNOSIS — R00.2 PALPITATION: ICD-10-CM

## 2019-02-05 DIAGNOSIS — Z45.09 ENCOUNTER FOR ELECTRONIC ANALYSIS OF REVEAL EVENT RECORDER: ICD-10-CM

## 2019-02-05 PROCEDURE — 93299 PR REM INTERROG ICPMS/SCRMS <30 D TECH REVIEW: CPT | Performed by: INTERNAL MEDICINE

## 2019-02-05 PROCEDURE — 93298 REM INTERROG DEV EVAL SCRMS: CPT | Performed by: INTERNAL MEDICINE

## 2019-02-06 ENCOUNTER — HOSPITAL ENCOUNTER (OUTPATIENT)
Dept: CARDIAC REHAB | Age: 74
Setting detail: THERAPIES SERIES
Discharge: HOME | End: 2019-02-06
Payer: MEDICARE

## 2019-02-06 PROCEDURE — 93798 PHYS/QHP OP CAR RHAB W/ECG: CPT

## 2019-02-08 ENCOUNTER — TELEPHONE (OUTPATIENT)
Dept: FAMILY MEDICINE CLINIC | Age: 74
End: 2019-02-08

## 2019-02-08 ENCOUNTER — ANTI-COAG VISIT (OUTPATIENT)
Dept: FAMILY MEDICINE CLINIC | Age: 74
End: 2019-02-08

## 2019-02-08 ENCOUNTER — HOSPITAL ENCOUNTER (OUTPATIENT)
Dept: CARDIAC REHAB | Age: 74
Setting detail: THERAPIES SERIES
Discharge: HOME | End: 2019-02-08
Payer: MEDICARE

## 2019-02-08 LAB
BASOPHILS ABSOLUTE: 18 CELLS/UL (ref 0–200)
BASOPHILS RELATIVE PERCENT: 0.2 %
CA 125: 24 U/ML
EOSINOPHILS ABSOLUTE: 234 CELLS/UL (ref 15–500)
EOSINOPHILS RELATIVE PERCENT: 2.6 %
HCT VFR BLD CALC: 39.6 % (ref 35–45)
HEMOGLOBIN: 12.8 G/DL (ref 11.7–15.5)
INR BLD: 1.9
LYMPHOCYTES ABSOLUTE: 1377 CELLS/UL (ref 850–3900)
LYMPHOCYTES RELATIVE PERCENT: 15.3 %
MCH RBC QN AUTO: 28 PG (ref 27–33)
MCHC RBC AUTO-ENTMCNC: 32.3 G/DL (ref 32–36)
MCV RBC AUTO: 86.7 FL (ref 80–100)
MONOCYTES ABSOLUTE: 630 CELLS/UL (ref 200–950)
MONOCYTES RELATIVE PERCENT: 7 %
NEUTROPHILS ABSOLUTE: 6741 CELLS/UL (ref 1500–7800)
PDW BLD-RTO: 15.4 % (ref 11–15)
PLATELET # BLD: 208 THOUSAND/UL (ref 140–400)
PMV BLD AUTO: 11.4 FL (ref 7.5–12.5)
PROTHROMBIN TIME: 18.7 SEC (ref 9–11.5)
RBC # BLD: 4.57 MILLION/UL (ref 3.8–5.1)
SEGMENTED NEUTROPHILS RELATIVE PERCENT: 74.9 %
WBC # BLD: 9 THOUSAND/UL (ref 3.8–10.8)

## 2019-02-08 PROCEDURE — 93798 PHYS/QHP OP CAR RHAB W/ECG: CPT

## 2019-02-11 ENCOUNTER — HOSPITAL ENCOUNTER (OUTPATIENT)
Dept: CARDIAC REHAB | Age: 74
Setting detail: THERAPIES SERIES
Discharge: HOME | End: 2019-02-11
Payer: MEDICARE

## 2019-02-11 PROCEDURE — 93798 PHYS/QHP OP CAR RHAB W/ECG: CPT

## 2019-02-13 ENCOUNTER — HOSPITAL ENCOUNTER (OUTPATIENT)
Dept: CARDIAC REHAB | Age: 74
Setting detail: THERAPIES SERIES
End: 2019-02-13
Payer: MEDICARE

## 2019-02-14 ENCOUNTER — ANTI-COAG VISIT (OUTPATIENT)
Dept: FAMILY MEDICINE CLINIC | Age: 74
End: 2019-02-14

## 2019-02-14 ENCOUNTER — TELEPHONE (OUTPATIENT)
Dept: FAMILY MEDICINE CLINIC | Age: 74
End: 2019-02-14

## 2019-02-14 LAB — INR BLD: 3.2

## 2019-02-15 ENCOUNTER — HOSPITAL ENCOUNTER (OUTPATIENT)
Dept: CARDIAC REHAB | Age: 74
Setting detail: THERAPIES SERIES
Discharge: HOME | End: 2019-02-15
Payer: MEDICARE

## 2019-02-15 PROCEDURE — 93798 PHYS/QHP OP CAR RHAB W/ECG: CPT

## 2019-02-18 ENCOUNTER — HOSPITAL ENCOUNTER (OUTPATIENT)
Dept: CARDIAC REHAB | Age: 74
Setting detail: THERAPIES SERIES
Discharge: HOME | End: 2019-02-18
Payer: MEDICARE

## 2019-02-18 PROCEDURE — 93798 PHYS/QHP OP CAR RHAB W/ECG: CPT

## 2019-02-20 ENCOUNTER — HOSPITAL ENCOUNTER (OUTPATIENT)
Dept: CARDIAC REHAB | Age: 74
Setting detail: THERAPIES SERIES
Discharge: HOME | End: 2019-02-20
Payer: MEDICARE

## 2019-02-20 PROCEDURE — 93798 PHYS/QHP OP CAR RHAB W/ECG: CPT

## 2019-02-22 ENCOUNTER — HOSPITAL ENCOUNTER (OUTPATIENT)
Dept: CARDIAC REHAB | Age: 74
Setting detail: THERAPIES SERIES
End: 2019-02-22
Payer: MEDICARE

## 2019-02-22 ENCOUNTER — ANTI-COAG VISIT (OUTPATIENT)
Dept: FAMILY MEDICINE CLINIC | Age: 74
End: 2019-02-22

## 2019-02-22 LAB — INR BLD: 2.3

## 2019-02-25 ENCOUNTER — HOSPITAL ENCOUNTER (OUTPATIENT)
Dept: CARDIAC REHAB | Age: 74
Setting detail: THERAPIES SERIES
Discharge: HOME | End: 2019-02-25
Payer: MEDICARE

## 2019-02-25 PROCEDURE — 93798 PHYS/QHP OP CAR RHAB W/ECG: CPT

## 2019-02-27 ENCOUNTER — HOSPITAL ENCOUNTER (OUTPATIENT)
Dept: CARDIAC REHAB | Age: 74
Setting detail: THERAPIES SERIES
Discharge: HOME | End: 2019-02-27
Payer: MEDICARE

## 2019-02-27 PROCEDURE — 93798 PHYS/QHP OP CAR RHAB W/ECG: CPT

## 2019-02-28 ENCOUNTER — ANTI-COAG VISIT (OUTPATIENT)
Dept: FAMILY MEDICINE CLINIC | Age: 74
End: 2019-02-28

## 2019-02-28 LAB — INR BLD: 1.8

## 2019-03-01 ENCOUNTER — OFFICE VISIT (OUTPATIENT)
Dept: FAMILY MEDICINE CLINIC | Age: 74
End: 2019-03-01
Payer: MEDICARE

## 2019-03-01 ENCOUNTER — HOSPITAL ENCOUNTER (OUTPATIENT)
Age: 74
Discharge: HOME | End: 2019-03-01
Payer: MEDICARE

## 2019-03-01 ENCOUNTER — HOSPITAL ENCOUNTER (OUTPATIENT)
Dept: CARDIAC REHAB | Age: 74
Setting detail: THERAPIES SERIES
Discharge: HOME | End: 2019-03-01
Payer: MEDICARE

## 2019-03-01 ENCOUNTER — HOSPITAL ENCOUNTER (OUTPATIENT)
Dept: GENERAL RADIOLOGY | Age: 74
Discharge: HOME | End: 2019-03-01
Payer: MEDICARE

## 2019-03-01 VITALS
OXYGEN SATURATION: 97 % | WEIGHT: 204.13 LBS | HEART RATE: 84 BPM | SYSTOLIC BLOOD PRESSURE: 110 MMHG | DIASTOLIC BLOOD PRESSURE: 70 MMHG | BODY MASS INDEX: 31.5 KG/M2 | RESPIRATION RATE: 16 BRPM

## 2019-03-01 DIAGNOSIS — E66.9 DIABETES MELLITUS TYPE 2 IN OBESE (HCC): ICD-10-CM

## 2019-03-01 DIAGNOSIS — K52.81 EOSINOPHILIC GASTRITIS: ICD-10-CM

## 2019-03-01 DIAGNOSIS — K52.81: ICD-10-CM

## 2019-03-01 DIAGNOSIS — K66.8 INTRA-ABDOMINAL FREE AIR OF UNKNOWN ETIOLOGY: Primary | ICD-10-CM

## 2019-03-01 DIAGNOSIS — I10 ESSENTIAL HYPERTENSION: ICD-10-CM

## 2019-03-01 DIAGNOSIS — E11.69 DIABETES MELLITUS TYPE 2 IN OBESE (HCC): ICD-10-CM

## 2019-03-01 DIAGNOSIS — M54.6 RIGHT-SIDED THORACIC BACK PAIN, UNSPECIFIED CHRONICITY: ICD-10-CM

## 2019-03-01 DIAGNOSIS — I48.91 ATRIAL FIBRILLATION, UNSPECIFIED TYPE (HCC): ICD-10-CM

## 2019-03-01 LAB
ANION GAP SERPL CALCULATED.3IONS-SCNC: 15 MMOL/L (ref 3–16)
BASOPHILS ABSOLUTE: 0 K/UL (ref 0–0.2)
BASOPHILS RELATIVE PERCENT: 0.3 %
BUN BLDV-MCNC: 18 MG/DL (ref 7–20)
CALCIUM SERPL-MCNC: 9 MG/DL (ref 8.3–10.6)
CHLORIDE BLD-SCNC: 103 MMOL/L (ref 99–110)
CO2: 24 MMOL/L (ref 21–32)
CREAT SERPL-MCNC: 1.1 MG/DL (ref 0.6–1.2)
EOSINOPHILS ABSOLUTE: 0.2 K/UL (ref 0–0.6)
EOSINOPHILS RELATIVE PERCENT: 2.2 %
GFR AFRICAN AMERICAN: 59
GFR NON-AFRICAN AMERICAN: 49
GLUCOSE BLD-MCNC: 183 MG/DL (ref 70–99)
HCT VFR BLD CALC: 36.2 % (ref 36–48)
HEMOGLOBIN: 11.9 G/DL (ref 12–16)
LYMPHOCYTES ABSOLUTE: 1.3 K/UL (ref 1–5.1)
LYMPHOCYTES RELATIVE PERCENT: 18.4 %
MCH RBC QN AUTO: 28.2 PG (ref 26–34)
MCHC RBC AUTO-ENTMCNC: 32.8 G/DL (ref 31–36)
MCV RBC AUTO: 85.8 FL (ref 80–100)
MONOCYTES ABSOLUTE: 0.7 K/UL (ref 0–1.3)
MONOCYTES RELATIVE PERCENT: 9.3 %
NEUTROPHILS ABSOLUTE: 5 K/UL (ref 1.7–7.7)
NEUTROPHILS RELATIVE PERCENT: 69.8 %
PDW BLD-RTO: 16.4 % (ref 12.4–15.4)
PLATELET # BLD: 229 K/UL (ref 135–450)
PMV BLD AUTO: 8.8 FL (ref 5–10.5)
POTASSIUM SERPL-SCNC: 4.2 MMOL/L (ref 3.5–5.1)
RBC # BLD: 4.22 M/UL (ref 4–5.2)
SODIUM BLD-SCNC: 142 MMOL/L (ref 136–145)
WBC # BLD: 7.1 K/UL (ref 4–11)

## 2019-03-01 PROCEDURE — 74019 RADEX ABDOMEN 2 VIEWS: CPT

## 2019-03-01 PROCEDURE — 93798 PHYS/QHP OP CAR RHAB W/ECG: CPT

## 2019-03-01 PROCEDURE — 99214 OFFICE O/P EST MOD 30 MIN: CPT | Performed by: FAMILY MEDICINE

## 2019-03-01 PROCEDURE — 85025 COMPLETE CBC W/AUTO DIFF WBC: CPT

## 2019-03-01 PROCEDURE — 80048 BASIC METABOLIC PNL TOTAL CA: CPT

## 2019-03-01 PROCEDURE — 36415 COLL VENOUS BLD VENIPUNCTURE: CPT

## 2019-03-01 RX ORDER — POLYETHYLENE GLYCOL 3350 17 G/17G
POWDER, FOR SOLUTION ORAL
Qty: 1581 G | Refills: 3 | Status: ON HOLD | OUTPATIENT
Start: 2019-03-01 | End: 2019-04-27 | Stop reason: HOSPADM

## 2019-03-01 RX ORDER — DICYCLOMINE HYDROCHLORIDE 10 MG/1
10 CAPSULE ORAL 4 TIMES DAILY
Qty: 120 CAPSULE | Refills: 5 | Status: ON HOLD | OUTPATIENT
Start: 2019-03-01 | End: 2019-04-24

## 2019-03-01 RX ORDER — TORSEMIDE 20 MG/1
20 TABLET ORAL DAILY
Qty: 90 TABLET | Refills: 1 | Status: SHIPPED | OUTPATIENT
Start: 2019-03-01 | End: 2019-07-25 | Stop reason: SDUPTHER

## 2019-03-01 ASSESSMENT — PATIENT HEALTH QUESTIONNAIRE - PHQ9
SUM OF ALL RESPONSES TO PHQ QUESTIONS 1-9: 0
SUM OF ALL RESPONSES TO PHQ9 QUESTIONS 1 & 2: 0
2. FEELING DOWN, DEPRESSED OR HOPELESS: 0
1. LITTLE INTEREST OR PLEASURE IN DOING THINGS: 0
SUM OF ALL RESPONSES TO PHQ QUESTIONS 1-9: 0

## 2019-03-04 ENCOUNTER — HOSPITAL ENCOUNTER (OUTPATIENT)
Dept: CARDIAC REHAB | Age: 74
Setting detail: THERAPIES SERIES
Discharge: HOME | End: 2019-03-04
Payer: MEDICARE

## 2019-03-04 PROCEDURE — 93798 PHYS/QHP OP CAR RHAB W/ECG: CPT

## 2019-03-04 RX ORDER — POLYETHYLENE GLYCOL 3350 17 G/17G
POWDER, FOR SOLUTION ORAL
Qty: 1581 G | Refills: 1 | Status: SHIPPED | OUTPATIENT
Start: 2019-03-04 | End: 2019-03-19

## 2019-03-06 ENCOUNTER — HOSPITAL ENCOUNTER (OUTPATIENT)
Dept: CARDIAC REHAB | Age: 74
Setting detail: THERAPIES SERIES
Discharge: HOME | End: 2019-03-06
Payer: MEDICARE

## 2019-03-06 PROCEDURE — 93798 PHYS/QHP OP CAR RHAB W/ECG: CPT

## 2019-03-08 ENCOUNTER — HOSPITAL ENCOUNTER (OUTPATIENT)
Dept: CARDIAC REHAB | Age: 74
Setting detail: THERAPIES SERIES
Discharge: HOME | End: 2019-03-08
Payer: MEDICARE

## 2019-03-08 ENCOUNTER — ANTI-COAG VISIT (OUTPATIENT)
Dept: FAMILY MEDICINE CLINIC | Age: 74
End: 2019-03-08

## 2019-03-08 LAB — INR BLD: 1.8

## 2019-03-08 PROCEDURE — 93798 PHYS/QHP OP CAR RHAB W/ECG: CPT

## 2019-03-11 ENCOUNTER — HOSPITAL ENCOUNTER (OUTPATIENT)
Dept: CARDIAC REHAB | Age: 74
Setting detail: THERAPIES SERIES
Discharge: HOME | End: 2019-03-11
Payer: MEDICARE

## 2019-03-11 ENCOUNTER — CARE COORDINATION (OUTPATIENT)
Dept: CARE COORDINATION | Age: 74
End: 2019-03-11

## 2019-03-11 PROCEDURE — 93798 PHYS/QHP OP CAR RHAB W/ECG: CPT

## 2019-03-13 ENCOUNTER — HOSPITAL ENCOUNTER (OUTPATIENT)
Dept: CARDIAC REHAB | Age: 74
Setting detail: THERAPIES SERIES
Discharge: HOME | End: 2019-03-13
Payer: MEDICARE

## 2019-03-13 PROCEDURE — 93798 PHYS/QHP OP CAR RHAB W/ECG: CPT

## 2019-03-15 ENCOUNTER — HOSPITAL ENCOUNTER (OUTPATIENT)
Dept: CARDIAC REHAB | Age: 74
Setting detail: THERAPIES SERIES
Discharge: HOME | End: 2019-03-15
Payer: MEDICARE

## 2019-03-15 PROCEDURE — 93798 PHYS/QHP OP CAR RHAB W/ECG: CPT

## 2019-03-18 ENCOUNTER — HOSPITAL ENCOUNTER (OUTPATIENT)
Dept: CARDIAC REHAB | Age: 74
Setting detail: THERAPIES SERIES
End: 2019-03-18
Payer: MEDICARE

## 2019-03-19 ENCOUNTER — TELEPHONE (OUTPATIENT)
Dept: FAMILY MEDICINE CLINIC | Age: 74
End: 2019-03-19

## 2019-03-19 ENCOUNTER — OFFICE VISIT (OUTPATIENT)
Dept: FAMILY MEDICINE CLINIC | Age: 74
End: 2019-03-19
Payer: MEDICARE

## 2019-03-19 ENCOUNTER — NURSE ONLY (OUTPATIENT)
Dept: CARDIOLOGY CLINIC | Age: 74
End: 2019-03-19
Payer: MEDICARE

## 2019-03-19 VITALS
DIASTOLIC BLOOD PRESSURE: 80 MMHG | WEIGHT: 203 LBS | SYSTOLIC BLOOD PRESSURE: 110 MMHG | TEMPERATURE: 97 F | BODY MASS INDEX: 31.33 KG/M2

## 2019-03-19 DIAGNOSIS — H00.012 HORDEOLUM EXTERNUM OF RIGHT LOWER EYELID: Primary | ICD-10-CM

## 2019-03-19 DIAGNOSIS — Z45.09 ENCOUNTER FOR ELECTRONIC ANALYSIS OF REVEAL EVENT RECORDER: ICD-10-CM

## 2019-03-19 DIAGNOSIS — R00.2 PALPITATION: ICD-10-CM

## 2019-03-19 PROCEDURE — 99213 OFFICE O/P EST LOW 20 MIN: CPT | Performed by: FAMILY MEDICINE

## 2019-03-19 PROCEDURE — 93298 REM INTERROG DEV EVAL SCRMS: CPT | Performed by: INTERNAL MEDICINE

## 2019-03-19 PROCEDURE — 93299 PR REM INTERROG ICPMS/SCRMS <30 D TECH REVIEW: CPT | Performed by: INTERNAL MEDICINE

## 2019-03-19 RX ORDER — ERYTHROMYCIN 5 MG/G
OINTMENT OPHTHALMIC 3 TIMES DAILY
Qty: 3.5 G | Refills: 1 | Status: SHIPPED | OUTPATIENT
Start: 2019-03-19 | End: 2019-03-29

## 2019-03-19 ASSESSMENT — ENCOUNTER SYMPTOMS
EYE REDNESS: 1
EYE DISCHARGE: 1
SHORTNESS OF BREATH: 0
EYE PAIN: 1

## 2019-03-20 ENCOUNTER — HOSPITAL ENCOUNTER (OUTPATIENT)
Dept: CARDIAC REHAB | Age: 74
Setting detail: THERAPIES SERIES
End: 2019-03-20
Payer: MEDICARE

## 2019-03-21 LAB — INR BLD: 1.7

## 2019-03-22 ENCOUNTER — HOSPITAL ENCOUNTER (OUTPATIENT)
Dept: CARDIAC REHAB | Age: 74
Setting detail: THERAPIES SERIES
Discharge: HOME | End: 2019-03-22
Payer: MEDICARE

## 2019-03-22 ENCOUNTER — ANTI-COAG VISIT (OUTPATIENT)
Dept: FAMILY MEDICINE CLINIC | Age: 74
End: 2019-03-22

## 2019-03-22 DIAGNOSIS — E11.69 DIABETES MELLITUS TYPE 2 IN OBESE (HCC): Primary | ICD-10-CM

## 2019-03-22 DIAGNOSIS — E66.9 DIABETES MELLITUS TYPE 2 IN OBESE (HCC): Primary | ICD-10-CM

## 2019-03-22 PROCEDURE — 93798 PHYS/QHP OP CAR RHAB W/ECG: CPT

## 2019-03-22 RX ORDER — PEN NEEDLE, DIABETIC 32GX 5/32"
NEEDLE, DISPOSABLE MISCELLANEOUS
Qty: 360 EACH | Refills: 3 | Status: SHIPPED | OUTPATIENT
Start: 2019-03-22 | End: 2021-06-14 | Stop reason: SDUPTHER

## 2019-03-25 ENCOUNTER — APPOINTMENT (OUTPATIENT)
Dept: CARDIAC REHAB | Age: 74
End: 2019-03-25
Payer: MEDICARE

## 2019-03-27 ENCOUNTER — APPOINTMENT (OUTPATIENT)
Dept: CARDIAC REHAB | Age: 74
End: 2019-03-27
Payer: MEDICARE

## 2019-03-29 ENCOUNTER — APPOINTMENT (OUTPATIENT)
Dept: CARDIAC REHAB | Age: 74
End: 2019-03-29
Payer: MEDICARE

## 2019-04-01 ENCOUNTER — HOSPITAL ENCOUNTER (OUTPATIENT)
Dept: CARDIAC REHAB | Age: 74
Setting detail: THERAPIES SERIES
Discharge: HOME | End: 2019-04-01
Payer: MEDICARE

## 2019-04-01 PROCEDURE — 93798 PHYS/QHP OP CAR RHAB W/ECG: CPT

## 2019-04-03 ENCOUNTER — CARE COORDINATION (OUTPATIENT)
Dept: CARE COORDINATION | Age: 74
End: 2019-04-03

## 2019-04-03 ENCOUNTER — HOSPITAL ENCOUNTER (OUTPATIENT)
Dept: CARDIAC REHAB | Age: 74
Setting detail: THERAPIES SERIES
Discharge: HOME | End: 2019-04-03
Payer: MEDICARE

## 2019-04-03 PROCEDURE — 93798 PHYS/QHP OP CAR RHAB W/ECG: CPT

## 2019-04-03 RX ORDER — ROSUVASTATIN CALCIUM 20 MG/1
20 TABLET, COATED ORAL DAILY
Qty: 90 TABLET | Refills: 0 | Status: SHIPPED | OUTPATIENT
Start: 2019-04-03 | End: 2019-05-30 | Stop reason: SDUPTHER

## 2019-04-05 ENCOUNTER — ANTI-COAG VISIT (OUTPATIENT)
Dept: FAMILY MEDICINE CLINIC | Age: 74
End: 2019-04-05

## 2019-04-05 ENCOUNTER — HOSPITAL ENCOUNTER (OUTPATIENT)
Dept: CARDIAC REHAB | Age: 74
Setting detail: THERAPIES SERIES
Discharge: HOME | End: 2019-04-05
Payer: MEDICARE

## 2019-04-05 DIAGNOSIS — K52.81 EOSINOPHILIC GASTRITIS: ICD-10-CM

## 2019-04-05 LAB — INR BLD: 2.3

## 2019-04-05 PROCEDURE — 93798 PHYS/QHP OP CAR RHAB W/ECG: CPT

## 2019-04-08 ENCOUNTER — HOSPITAL ENCOUNTER (OUTPATIENT)
Dept: CARDIAC REHAB | Age: 74
Setting detail: THERAPIES SERIES
Discharge: HOME | End: 2019-04-08
Payer: MEDICARE

## 2019-04-08 PROCEDURE — 93798 PHYS/QHP OP CAR RHAB W/ECG: CPT

## 2019-04-08 RX ORDER — WARFARIN SODIUM 5 MG/1
5 TABLET ORAL DAILY
Qty: 100 TABLET | Refills: 3 | Status: SHIPPED | OUTPATIENT
Start: 2019-04-08 | End: 2020-02-26

## 2019-04-08 RX ORDER — PREDNISONE 10 MG/1
TABLET ORAL
Qty: 100 TABLET | Refills: 3 | Status: ON HOLD | OUTPATIENT
Start: 2019-04-08 | End: 2019-04-27 | Stop reason: HOSPADM

## 2019-04-08 RX ORDER — MONTELUKAST SODIUM 10 MG/1
TABLET ORAL
Qty: 90 TABLET | Refills: 0 | Status: SHIPPED | OUTPATIENT
Start: 2019-04-08 | End: 2019-12-19 | Stop reason: SDUPTHER

## 2019-04-10 ENCOUNTER — HOSPITAL ENCOUNTER (OUTPATIENT)
Dept: CARDIAC REHAB | Age: 74
Setting detail: THERAPIES SERIES
End: 2019-04-10
Payer: MEDICARE

## 2019-04-12 ENCOUNTER — HOSPITAL ENCOUNTER (OUTPATIENT)
Dept: CARDIAC REHAB | Age: 74
Setting detail: THERAPIES SERIES
End: 2019-04-12
Payer: MEDICARE

## 2019-04-12 ENCOUNTER — OFFICE VISIT (OUTPATIENT)
Dept: FAMILY MEDICINE CLINIC | Age: 74
End: 2019-04-12
Payer: MEDICARE

## 2019-04-12 VITALS
SYSTOLIC BLOOD PRESSURE: 114 MMHG | HEART RATE: 94 BPM | BODY MASS INDEX: 31.48 KG/M2 | OXYGEN SATURATION: 95 % | WEIGHT: 204 LBS | DIASTOLIC BLOOD PRESSURE: 74 MMHG

## 2019-04-12 DIAGNOSIS — M54.9 MID BACK PAIN: ICD-10-CM

## 2019-04-12 DIAGNOSIS — M10.9 ACUTE GOUT OF RIGHT WRIST, UNSPECIFIED CAUSE: Primary | ICD-10-CM

## 2019-04-12 PROCEDURE — 99213 OFFICE O/P EST LOW 20 MIN: CPT | Performed by: FAMILY MEDICINE

## 2019-04-12 RX ORDER — COLCHICINE 0.6 MG/1
0.6 TABLET ORAL 2 TIMES DAILY
Qty: 20 TABLET | Refills: 0 | Status: ON HOLD | OUTPATIENT
Start: 2019-04-12 | End: 2019-04-24

## 2019-04-12 NOTE — PROGRESS NOTES
Subjective:      Patient ID: Shereen Hinojosa is a 68 y.o. female. CC: Patient presents for acute medical problem-mid back pain and severe right wrist pain . Medical assistant notes reviewed. HPI Patient presents with upper to mid back pain for the past 2 days. Patient states today she woke up with her right wrist swollen and the swelling has spread across her wrist to her hand. Patient was concerned that she's had some right back pain in the past and she also had a bowel obstruction but she's not have any bowel symptoms or abdominal symptoms per se. She suddenly woke up this morning of the right wrist was sore moreswollenandtenderthroughouttheday. There has been no trauma. She's also concerned that the last week her blood sugars have become elevated. She's taking 55 units of insulin daily. Prior to that her blood sugars ranging 116-1:45 in the morning    Review of Systems     Allergies   Allergen Reactions    Etirsppw-Jdyrxcn-Tpqsqu [Fluocinolone] Shortness Of Breath    Ciprofloxacin Shortness Of Breath    Diovan [Valsartan] Shortness Of Breath    Flagyl [Metronidazole] Shortness Of Breath     Has taken diflucan at home 12/7/15    Metformin And Related [Metformin And Related] Shortness Of Breath    Benazepril      Other reaction(s): Not Recorded    Morphine      Bad reaction. \"makes her feel horrible\".  Saxagliptin      Other reaction(s): Not Recorded    Levaquin [Levofloxacin] Rash       Objective:   Physical Exam   Constitutional: She appears well-developed and well-nourished. No distress. Pulmonary/Chest: Effort normal and breath sounds normal. No respiratory distress. She exhibits no tenderness. Musculoskeletal:        Right wrist: She exhibits decreased range of motion, tenderness, swelling and deformity (warmth of both dorsal and palmar aspect). Right hand: Normal.   Neurological: She is alert. Skin: No rash noted.        Assessment:      Trinh Ellis was seen today for back pain.    Diagnoses and all orders for this visit:    Acute gout of right wrist, unspecified cause    Mid back pain    Other orders  -     colchicine (COLCRYS) 0.6 MG tablet; Take 1 tablet by mouth 2 times daily            Plan:      Patient to try to colchicine medication and if her symptoms do not improve we discussed using prednisone.   I believe her sugars are elevated simply because the inflammation but adjusted insulin 60 units  RTC when necessary

## 2019-04-15 ENCOUNTER — HOSPITAL ENCOUNTER (OUTPATIENT)
Dept: CARDIAC REHAB | Age: 74
Setting detail: THERAPIES SERIES
Discharge: HOME | End: 2019-04-15
Payer: MEDICARE

## 2019-04-15 PROCEDURE — 93798 PHYS/QHP OP CAR RHAB W/ECG: CPT

## 2019-04-17 ENCOUNTER — HOSPITAL ENCOUNTER (OUTPATIENT)
Dept: CARDIAC REHAB | Age: 74
Setting detail: THERAPIES SERIES
Discharge: HOME | End: 2019-04-17
Payer: MEDICARE

## 2019-04-17 PROCEDURE — 93798 PHYS/QHP OP CAR RHAB W/ECG: CPT

## 2019-04-18 ENCOUNTER — ANTI-COAG VISIT (OUTPATIENT)
Dept: FAMILY MEDICINE CLINIC | Age: 74
End: 2019-04-18

## 2019-04-18 LAB — INR BLD: 2.7

## 2019-04-19 ENCOUNTER — APPOINTMENT (OUTPATIENT)
Dept: CARDIAC REHAB | Age: 74
End: 2019-04-19
Payer: MEDICARE

## 2019-04-22 ENCOUNTER — HOSPITAL ENCOUNTER (OUTPATIENT)
Dept: CARDIAC REHAB | Age: 74
Setting detail: THERAPIES SERIES
Discharge: HOME | End: 2019-04-22
Payer: MEDICARE

## 2019-04-22 PROCEDURE — 93798 PHYS/QHP OP CAR RHAB W/ECG: CPT

## 2019-04-22 RX ORDER — TERBINAFINE HYDROCHLORIDE 250 MG/1
TABLET ORAL
Qty: 90 TABLET | Refills: 0 | Status: SHIPPED | OUTPATIENT
Start: 2019-04-22 | End: 2019-09-11 | Stop reason: ALTCHOICE

## 2019-04-24 ENCOUNTER — HOSPITAL ENCOUNTER (OUTPATIENT)
Dept: CARDIAC REHAB | Age: 74
Setting detail: THERAPIES SERIES
Discharge: HOME | End: 2019-04-24
Payer: MEDICARE

## 2019-04-24 ENCOUNTER — APPOINTMENT (OUTPATIENT)
Dept: GENERAL RADIOLOGY | Age: 74
DRG: 552 | End: 2019-04-24
Payer: MEDICARE

## 2019-04-24 ENCOUNTER — OFFICE VISIT (OUTPATIENT)
Dept: FAMILY MEDICINE CLINIC | Age: 74
End: 2019-04-24
Payer: MEDICARE

## 2019-04-24 ENCOUNTER — APPOINTMENT (OUTPATIENT)
Dept: CT IMAGING | Age: 74
DRG: 552 | End: 2019-04-24
Payer: MEDICARE

## 2019-04-24 ENCOUNTER — HOSPITAL ENCOUNTER (INPATIENT)
Age: 74
LOS: 3 days | Discharge: HOME | DRG: 552 | End: 2019-04-27
Attending: EMERGENCY MEDICINE | Admitting: HOSPITALIST
Payer: MEDICARE

## 2019-04-24 VITALS
TEMPERATURE: 97.4 F | SYSTOLIC BLOOD PRESSURE: 130 MMHG | HEART RATE: 98 BPM | WEIGHT: 200 LBS | BODY MASS INDEX: 30.86 KG/M2 | DIASTOLIC BLOOD PRESSURE: 70 MMHG | OXYGEN SATURATION: 96 %

## 2019-04-24 DIAGNOSIS — R91.1 PULMONARY NODULE: ICD-10-CM

## 2019-04-24 DIAGNOSIS — M54.9 INTRACTABLE BACK PAIN: ICD-10-CM

## 2019-04-24 DIAGNOSIS — N94.89 ADNEXAL MASS: ICD-10-CM

## 2019-04-24 DIAGNOSIS — N30.01 ACUTE CYSTITIS WITH HEMATURIA: ICD-10-CM

## 2019-04-24 DIAGNOSIS — R93.3 ABNORMAL CT SCAN, SMALL BOWEL: ICD-10-CM

## 2019-04-24 DIAGNOSIS — Z79.01 ADEQUATE ANTICOAGULATION ON ANTICOAGULANT THERAPY: ICD-10-CM

## 2019-04-24 DIAGNOSIS — R30.0 DYSURIA: Primary | ICD-10-CM

## 2019-04-24 DIAGNOSIS — R77.8 ELEVATED TROPONIN: ICD-10-CM

## 2019-04-24 DIAGNOSIS — M54.6 ACUTE MIDLINE THORACIC BACK PAIN: Primary | ICD-10-CM

## 2019-04-24 PROBLEM — R10.9 ABDOMINAL PAIN: Status: ACTIVE | Noted: 2019-04-24

## 2019-04-24 LAB
A/G RATIO: 1.1 (ref 1.1–2.2)
ALBUMIN SERPL-MCNC: 3.6 G/DL (ref 3.4–5)
ALP BLD-CCNC: 94 U/L (ref 40–129)
ALT SERPL-CCNC: 16 U/L (ref 10–40)
ANION GAP SERPL CALCULATED.3IONS-SCNC: 13 MMOL/L (ref 3–16)
APTT: 42.3 SEC (ref 26–36)
AST SERPL-CCNC: 21 U/L (ref 15–37)
BACTERIA URINE, POC: NEGATIVE
BACTERIA: ABNORMAL /HPF
BASOPHILS ABSOLUTE: 0 K/UL (ref 0–0.2)
BASOPHILS RELATIVE PERCENT: 0.5 %
BILIRUB SERPL-MCNC: 0.4 MG/DL (ref 0–1)
BILIRUBIN URINE: 0 MG/DL
BILIRUBIN URINE: NEGATIVE
BLOOD, URINE: NEGATIVE
BLOOD, URINE: NEGATIVE
BUN BLDV-MCNC: 22 MG/DL (ref 7–20)
CALCIUM SERPL-MCNC: 8.9 MG/DL (ref 8.3–10.6)
CASTS URINE, POC: NEGATIVE
CHLORIDE BLD-SCNC: 105 MMOL/L (ref 99–110)
CLARITY: CLEAR
CLARITY: CLEAR
CO2: 24 MMOL/L (ref 21–32)
COLOR: NORMAL
COLOR: YELLOW
CREAT SERPL-MCNC: 1.2 MG/DL (ref 0.6–1.2)
CRYSTALS URINE, POC: NEGATIVE
EOSINOPHILS ABSOLUTE: 0.3 K/UL (ref 0–0.6)
EOSINOPHILS RELATIVE PERCENT: 3.6 %
EPI CELLS URINE, POC: NEGATIVE
EPITHELIAL CELLS, UA: 6 /HPF (ref 0–5)
GFR AFRICAN AMERICAN: 53
GFR NON-AFRICAN AMERICAN: 44
GLOBULIN: 3.3 G/DL
GLUCOSE BLD-MCNC: 140 MG/DL (ref 70–99)
GLUCOSE BLD-MCNC: 159 MG/DL (ref 70–99)
GLUCOSE URINE: NEGATIVE
GLUCOSE URINE: NEGATIVE MG/DL
HCT VFR BLD CALC: 39.3 % (ref 36–48)
HEMOGLOBIN: 12.8 G/DL (ref 12–16)
HYALINE CASTS: 1 /LPF (ref 0–8)
INR BLD: 2.96 (ref 0.86–1.14)
KETONES, URINE: NEGATIVE
KETONES, URINE: NEGATIVE MG/DL
LACTIC ACID: 0.9 MMOL/L (ref 0.4–2)
LEUKOCYTE EST, POC: NORMAL
LEUKOCYTE ESTERASE, URINE: ABNORMAL
LIPASE: 72 U/L (ref 13–60)
LYMPHOCYTES ABSOLUTE: 1.3 K/UL (ref 1–5.1)
LYMPHOCYTES RELATIVE PERCENT: 18.8 %
MCH RBC QN AUTO: 28 PG (ref 26–34)
MCHC RBC AUTO-ENTMCNC: 32.5 G/DL (ref 31–36)
MCV RBC AUTO: 86.2 FL (ref 80–100)
MICROSCOPIC EXAMINATION: YES
MONOCYTES ABSOLUTE: 0.8 K/UL (ref 0–1.3)
MONOCYTES RELATIVE PERCENT: 11.4 %
NEUTROPHILS ABSOLUTE: 4.6 K/UL (ref 1.7–7.7)
NEUTROPHILS RELATIVE PERCENT: 65.7 %
NITRITE, URINE: NEGATIVE
NITRITE, URINE: NEGATIVE
PDW BLD-RTO: 16.2 % (ref 12.4–15.4)
PERFORMED ON: ABNORMAL
PH UA: 5.5 (ref 5–8)
PH UA: 6.5 (ref 4.5–8)
PLATELET # BLD: 228 K/UL (ref 135–450)
PMV BLD AUTO: 8.9 FL (ref 5–10.5)
POTASSIUM SERPL-SCNC: 4.4 MMOL/L (ref 3.5–5.1)
PRO-BNP: 1874 PG/ML (ref 0–124)
PROTEIN UA: NEGATIVE
PROTEIN UA: NEGATIVE MG/DL
PROTHROMBIN TIME: 33.8 SEC (ref 9.8–13)
RBC # BLD: 4.56 M/UL (ref 4–5.2)
RBC UA: 3 /HPF (ref 0–4)
RBC URINE, POC: NEGATIVE
SODIUM BLD-SCNC: 142 MMOL/L (ref 136–145)
SPECIFIC GRAVITY UA: 1.01 (ref 1–1.03)
SPECIFIC GRAVITY UA: >1.03 (ref 1–1.03)
TOTAL PROTEIN: 6.9 G/DL (ref 6.4–8.2)
TROPONIN: 0.02 NG/ML
URINE REFLEX TO CULTURE: YES
URINE TYPE: ABNORMAL
UROBILINOGEN, URINE: 1 E.U./DL
UROBILINOGEN, URINE: NORMAL
WBC # BLD: 7 K/UL (ref 4–11)
WBC UA: 10 /HPF (ref 0–5)
WBC URINE, POC: NEGATIVE
YEAST URINE, POC: NEGATIVE

## 2019-04-24 PROCEDURE — 83605 ASSAY OF LACTIC ACID: CPT

## 2019-04-24 PROCEDURE — 81001 URINALYSIS AUTO W/SCOPE: CPT

## 2019-04-24 PROCEDURE — 81000 URINALYSIS NONAUTO W/SCOPE: CPT | Performed by: FAMILY MEDICINE

## 2019-04-24 PROCEDURE — 6360000002 HC RX W HCPCS: Performed by: PHYSICIAN ASSISTANT

## 2019-04-24 PROCEDURE — 83690 ASSAY OF LIPASE: CPT

## 2019-04-24 PROCEDURE — 71275 CT ANGIOGRAPHY CHEST: CPT

## 2019-04-24 PROCEDURE — 87086 URINE CULTURE/COLONY COUNT: CPT

## 2019-04-24 PROCEDURE — 83880 ASSAY OF NATRIURETIC PEPTIDE: CPT

## 2019-04-24 PROCEDURE — 99213 OFFICE O/P EST LOW 20 MIN: CPT | Performed by: FAMILY MEDICINE

## 2019-04-24 PROCEDURE — 6370000000 HC RX 637 (ALT 250 FOR IP): Performed by: EMERGENCY MEDICINE

## 2019-04-24 PROCEDURE — 85610 PROTHROMBIN TIME: CPT

## 2019-04-24 PROCEDURE — 74174 CTA ABD&PLVS W/CONTRAST: CPT

## 2019-04-24 PROCEDURE — 80053 COMPREHEN METABOLIC PANEL: CPT

## 2019-04-24 PROCEDURE — 93005 ELECTROCARDIOGRAM TRACING: CPT | Performed by: PHYSICIAN ASSISTANT

## 2019-04-24 PROCEDURE — 96374 THER/PROPH/DIAG INJ IV PUSH: CPT

## 2019-04-24 PROCEDURE — 99285 EMERGENCY DEPT VISIT HI MDM: CPT

## 2019-04-24 PROCEDURE — 96361 HYDRATE IV INFUSION ADD-ON: CPT

## 2019-04-24 PROCEDURE — 93798 PHYS/QHP OP CAR RHAB W/ECG: CPT

## 2019-04-24 PROCEDURE — 6360000004 HC RX CONTRAST MEDICATION: Performed by: EMERGENCY MEDICINE

## 2019-04-24 PROCEDURE — 71046 X-RAY EXAM CHEST 2 VIEWS: CPT

## 2019-04-24 PROCEDURE — 85730 THROMBOPLASTIN TIME PARTIAL: CPT

## 2019-04-24 PROCEDURE — 85025 COMPLETE CBC W/AUTO DIFF WBC: CPT

## 2019-04-24 PROCEDURE — 36415 COLL VENOUS BLD VENIPUNCTURE: CPT

## 2019-04-24 PROCEDURE — 2500000003 HC RX 250 WO HCPCS: Performed by: EMERGENCY MEDICINE

## 2019-04-24 PROCEDURE — 96375 TX/PRO/DX INJ NEW DRUG ADDON: CPT

## 2019-04-24 PROCEDURE — 2060000000 HC ICU INTERMEDIATE R&B

## 2019-04-24 PROCEDURE — 2500000003 HC RX 250 WO HCPCS: Performed by: PHYSICIAN ASSISTANT

## 2019-04-24 PROCEDURE — 96376 TX/PRO/DX INJ SAME DRUG ADON: CPT

## 2019-04-24 PROCEDURE — 2580000003 HC RX 258: Performed by: PHYSICIAN ASSISTANT

## 2019-04-24 PROCEDURE — 84484 ASSAY OF TROPONIN QUANT: CPT

## 2019-04-24 RX ORDER — ASPIRIN 81 MG/1
324 TABLET, CHEWABLE ORAL ONCE
Status: COMPLETED | OUTPATIENT
Start: 2019-04-24 | End: 2019-04-24

## 2019-04-24 RX ORDER — HYDROMORPHONE HYDROCHLORIDE 1 MG/ML
1 INJECTION, SOLUTION INTRAMUSCULAR; INTRAVENOUS; SUBCUTANEOUS
Status: COMPLETED | OUTPATIENT
Start: 2019-04-24 | End: 2019-04-25

## 2019-04-24 RX ORDER — ONDANSETRON 2 MG/ML
INJECTION INTRAMUSCULAR; INTRAVENOUS
Status: DISPENSED
Start: 2019-04-24 | End: 2019-04-25

## 2019-04-24 RX ORDER — HYDROMORPHONE HYDROCHLORIDE 1 MG/ML
1 INJECTION, SOLUTION INTRAMUSCULAR; INTRAVENOUS; SUBCUTANEOUS ONCE
Status: COMPLETED | OUTPATIENT
Start: 2019-04-24 | End: 2019-04-24

## 2019-04-24 RX ORDER — NITROFURANTOIN 25; 75 MG/1; MG/1
100 CAPSULE ORAL 2 TIMES DAILY
Qty: 10 CAPSULE | Refills: 0 | Status: ON HOLD | OUTPATIENT
Start: 2019-04-24 | End: 2019-04-27 | Stop reason: HOSPADM

## 2019-04-24 RX ORDER — ONDANSETRON 2 MG/ML
4 INJECTION INTRAMUSCULAR; INTRAVENOUS ONCE
Status: COMPLETED | OUTPATIENT
Start: 2019-04-24 | End: 2019-04-24

## 2019-04-24 RX ORDER — 0.9 % SODIUM CHLORIDE 0.9 %
1000 INTRAVENOUS SOLUTION INTRAVENOUS ONCE
Status: COMPLETED | OUTPATIENT
Start: 2019-04-24 | End: 2019-04-24

## 2019-04-24 RX ORDER — SODIUM CHLORIDE 9 MG/ML
INJECTION, SOLUTION INTRAVENOUS
Status: DISPENSED
Start: 2019-04-24 | End: 2019-04-25

## 2019-04-24 RX ADMIN — ASPIRIN 81 MG 324 MG: 81 TABLET ORAL at 21:33

## 2019-04-24 RX ADMIN — SODIUM CHLORIDE 1000 ML: 9 INJECTION, SOLUTION INTRAVENOUS at 18:03

## 2019-04-24 RX ADMIN — ONDANSETRON 4 MG: 2 INJECTION INTRAMUSCULAR; INTRAVENOUS at 18:03

## 2019-04-24 RX ADMIN — HYDROMORPHONE HYDROCHLORIDE 1 MG: 1 INJECTION, SOLUTION INTRAMUSCULAR; INTRAVENOUS; SUBCUTANEOUS at 18:57

## 2019-04-24 RX ADMIN — HYDROMORPHONE HYDROCHLORIDE 1 MG: 1 INJECTION, SOLUTION INTRAMUSCULAR; INTRAVENOUS; SUBCUTANEOUS at 18:03

## 2019-04-24 RX ADMIN — IOPAMIDOL 75 ML: 755 INJECTION, SOLUTION INTRAVENOUS at 19:56

## 2019-04-24 ASSESSMENT — ENCOUNTER SYMPTOMS
ABDOMINAL PAIN: 0
NAUSEA: 0
SHORTNESS OF BREATH: 0
DIARRHEA: 0
VOMITING: 0
RHINORRHEA: 0
BACK PAIN: 1
COUGH: 0

## 2019-04-24 ASSESSMENT — PAIN SCALES - GENERAL
PAINLEVEL_OUTOF10: 10

## 2019-04-24 NOTE — ED PROVIDER NOTES
normal OH, narrow QRS, normal QTc  ST segments: no ST elevations or depressions  T waves: no abnormal inversions  Non-specific T wave changes: present  Prior EKG comparison: EKG dated 11/6/18 is not significantly different    MDM:  Diagnostic considerations included abdominal aortic aneurysm, cauda equina syndrome, epidural mass lesion, spinal stenosis, herniated disk causing severe stenosis, sprain/strain, fracture, contusion, sciatica, UTI, pyelonephritis, kidney stone    ED course was notable for mild elevation in troponin with nonspecific EKG changes but no acute changes compared to previous. The patient incidentally has a stable adnexal mass as well as pulmonary nodules and abnormal CT scan of the small bowel however has no abdominal pain. INR is therapeutic. Concern for atypical angina given lack of reproducibility to her pain. Aspirin ordered, already on coumadin so lovenox deferred. NSTEMI until proven otherwise at this point. During the patient's ED course, the patient was given:  Medications   HYDROmorphone HCl PF (DILAUDID) injection 1 mg (1 mg Intravenous Given 4/24/19 1857)   aspirin chewable tablet 324 mg (has no administration in time range)   0.9 % sodium chloride bolus (0 mLs Intravenous Stopped 4/24/19 2045)   HYDROmorphone HCl PF (DILAUDID) injection 1 mg (1 mg Intravenous Given 4/24/19 1803)   ondansetron (ZOFRAN) injection 4 mg (4 mg Intravenous Given 4/24/19 1803)   iopamidol (ISOVUE-370) 76 % injection 75 mL (75 mLs Intravenous Given 4/24/19 1956)        CLINICAL IMPRESSION  1. Acute midline thoracic back pain    2. Elevated troponin    3. Adnexal mass    4. Pulmonary nodule    5. Abnormal CT scan, small bowel    6. Adequate anticoagulation on anticoagulant therapy        DISPOSITION  Joe Hutson was admitted in fair condition. I have discussed the findings of today's workup with the patient and addressed the patient's questions and concerns.   The plan is to admit to the hospital

## 2019-04-24 NOTE — ED PROVIDER NOTES
[address] [facility]  eMERGENCY dEPARTMENT eNCOUnter        Pt Name: Carlton Ocampo  MRN: [id number]  [place] 1945  Date of evaluation: 4/24/2019  Provider: Nancy Armenta PA-C  PCP: Farnaz Zapata MD    This patient was seen and evaluated by the attending physician Carlos Leigh MD.      [address] [place] [address]       Chief Complaint   Patient presents with    Back Pain     Pt reports back pain in her lower back x 6 days. Hx of arthritis. Pt took pain meds at 3 then 5pm without relief. Has UTI. HISTORY OF PRESENT ILLNESS   (Location/Symptom, Timing/Onset, Context/Setting, Quality, Duration, Modifying Factors, Severity)  Note limiting factors. Carlton Ocampo is a 68 y.o. female presents to the emergency department today for evaluation for back pain. The patient tells me that she has a history of \"back issues\" and she states that for the past 3 days she has had worsening back pain. The patient states that it is to the middle of her upper back, and will radiate around to the right side of her chest.  Patient denies falling or injuring her back in any way, she is currently rating her pain as a 10/10 no known alleviating or aggravating factors. Patient is pacing around the room she is actively diaphoretic, , she states that she her pain has been constant, she otherwise denies radiculopathy of the pain. She denies any chest pain or shortness of breath. She does have a history of PE however she feels that this is different from PE she's had in the past.  She is on Coumadin. She states that she had open heart surgery in August.  She denies any fever or chills. She denies any cough or congestion. No urinary symptoms. No other complaints. Nursing Notes were all reviewed and agreed with or any disagreements were addressed  in the HPI.     REVIEW OF SYSTEMS    (2-9 systems for level 4, 10 or more for level 5)     Review of Systems   Constitutional: Negative for activity change, appetite change, chills and fever. HENT: Negative for congestion and rhinorrhea. Respiratory: Negative for cough and shortness of breath. Cardiovascular: Negative for chest pain. Gastrointestinal: Negative for abdominal pain, diarrhea, nausea and vomiting. Genitourinary: Negative for difficulty urinating, dysuria and hematuria. Musculoskeletal: Positive for back pain. Neurological: Negative for weakness and numbness. Positives and Pertinent negatives as per HPI. Except as noted abovein the ROS, all other systems were reviewed and negative.        PAST MEDICAL HISTORY     Past Medical History:   Diagnosis Date    Asthma     Atrial fibrillation (HCC)     Eosinophilia     Hemoptysis     HIGH CHOLESTEROL     Hx of blood clots     Hypertension     Irregular heart beat     Other specified gastritis without mention of hemorrhage     Palpitations     Skin cancer     basal and squamous    Type II or unspecified type diabetes mellitus without mention of complication, not stated as uncontrolled          SURGICAL HISTORY     Past Surgical History:   Procedure Laterality Date    BRONCHOSCOPY  07/18/2016    Dr. Maximo Klein - brushings from [address] [place] [address],[facility]  08/16/2018    Dr. Shoemaker Marker  02/07/2017    Dr. Gary Boyle - sigmoid diverticulosis, polypectomies x3    COLONOSCOPY  01/17/2014    Dr. Gary Boyle - sigmoid diverticulosis, polypectomies x3, internal hemorrhoids    COLONOSCOPY  10/10/2018    w/biopsy performed by Eppie Cogan, MD at [address]  08/21/2018    Dr. Josue Flores - x3 (LIMA-LAD, L SV-D1-PLV) modified BL MAZE procedure w/obliteration of WAYNE using 45mm AtriClip    HYSTERECTOMY      INSERTABLE CARDIAC MONITOR Left 08/16/2018    Dr. Compa Perdue # [id number] [employer]    MITRAL VALVE REPLACEMENT  08/21/2018    Dr. Josue Alexander  tissue valve    SKIN BIOPSY      TRANSESOPHAGEAL ECHOCARDIOGRAM  08/21/2018    during CABG/MVR    TUNNELED VENOUS CATHETER PLACEMENT Left 08/23/2018    Dr. Raya Zamora for HD    UPPER GASTROINTESTINAL ENDOSCOPY N/A 10/10/2018    w/biopsy performed by Amanda Bhagat MD at [address]       Previous Medications    ALBUTEROL SULFATE HFA (PROAIR HFA) 108 (90 BASE) MCG/ACT INHALER    Inhale 2 puffs into the lungs every 6 hours as needed for Wheezing    ASPIRIN 81 MG CHEWABLE TABLET    Take 1 tablet by mouth daily    BD PEN NEEDLE JANNET U/F 32G X 4 MM MISC    USE 1 PEN NEEDLE FOUR TIMES A DAY    BLOOD GLUCOSE MONITORING SUPPL (FREESTYLE LITE) GAETANO    1 Device by Does not apply route 4 times daily Patient to check blood sugar 4 times a day and PRN, she is treated with multiple daily injections of insulin that require correction dosing ;A1C 8.1 ; ICD code-E11.65 ,Z79.4    BLOOD GLUCOSE TEST STRIPS (FREESTYLE LITE) STRIP    1 each by Does not apply route 4 times daily Patient to check blood sugar 4 times a day and PRN, she is treated with multiple daily injections of insulin that require correction dosing ;A1C 8.1 ; ICD code-E11.65 ,Z79.4    CARVEDILOL (COREG) 3.125 MG TABLET    Take 1 tablet by mouth 2 times daily (with meals)    COLCHICINE (COLCRYS) 0.6 MG TABLET    Take 1 tablet by mouth 2 times daily    DICYCLOMINE (BENTYL) 10 MG CAPSULE    Take 1 capsule by mouth 4 times daily    DIGOXIN (LANOXIN) 125 MCG TABLET    Take 1 tablet by mouth daily    EXENATIDE (BYETTA 10 MCG PEN) 10 MCG/0.04ML INJECTION    Inject 0.04 mLs into the skin 2 times daily (with meals)    FREESTYLE LANCETS MISC    1 each by Does not apply route 4 times daily Patient to check blood sugar 4 times a day and PRN, she is treated with multiple daily injections of insulin that require correction dosing ;A1C 8.1 ; ICD code-E11.65 ,Z79.4    INSULIN GLARGINE (LANTUS) 100 UNIT/ML INJECTION PEN    Inject 55 Units into the skin every morning    MONTELUKAST (SINGULAIR) 10 MG TABLET    TAKE 1 TABLET NIGHTLY    NITROFURANTOIN, MACROCRYSTAL-MONOHYDRATE, (MACROBID) 100 MG CAPSULE    Take 1 capsule by mouth 2 times daily for 10 days    NYSTATIN (MYCOSTATIN) 384399 UNIT/ML SUSPENSION    1 teaspoon 4 times daily x 5 d    OMEPRAZOLE (PRILOSEC) 40 MG DELAYED RELEASE CAPSULE    Take 1 capsule by mouth every morning (before breakfast)    ONDANSETRON (ZOFRAN) 4 MG TABLET    Take 1 tablet by mouth 3 times daily as needed for Nausea or Vomiting    OXYCODONE HCL 5 MG TABA    Take 5 mg by mouth 4 times daily as needed. Caleb Lobato POLYETHYLENE GLYCOL (GLYCOLAX) POWDER    FILL DOSING CUP TO MARKED LINE (17 GRAMS) THEN MIX IN LIQUID AND DRINK DAILY    POTASSIUM CHLORIDE (KLOR-CON M) 10 MEQ EXTENDED RELEASE TABLET    Take 1 tablet by mouth daily    PREDNISONE (DELTASONE) 10 MG TABLET    TAKE 1 TABLET AS NEEDED (EOSINOPHILIC GASTRITIS)    ROSUVASTATIN (CRESTOR) 20 MG TABLET    Take 1 tablet by mouth daily    SPIRONOLACTONE (ALDACTONE) 25 MG TABLET    Take 1 tablet by mouth daily    TERBINAFINE (LAMISIL) 250 MG TABLET    TAKE 1 TABLET DAILY    TORSEMIDE (DEMADEX) 20 MG TABLET    Take 1 tablet by mouth daily    VITAMIN B-12 500 MCG TABLET    Take 1 tablet by mouth daily    WARFARIN (COUMADIN) 5 MG TABLET    Take 1 tablet by mouth daily         ALLERGIES     Gykaaefr-ajvuube-euaart [fluocinolone]; Ciprofloxacin; Diovan [valsartan]; Flagyl [metronidazole]; Metformin and related [metformin and related];  Benazepril; Morphine; Saxagliptin; and Levaquin [levofloxacin]    FAMILYHISTORY       Family History   Problem Relation Age of Onset    Cancer Father     Asthma Mother     Hypertension Mother     Heart Disease Mother     High Blood Pressure Mother           SOCIAL HISTORY       Social History     Socioeconomic History    Marital status:      Spouse name: None    Number of children: None    Years of education: None    Highest education level: None Occupational History    None   Social Needs    Financial resource strain: None    Food insecurity:     Worry: None     Inability: None    Transportation needs:     Medical: None     Non-medical: None   Tobacco Use    Smoking status: Never Smoker    Smokeless tobacco: Never Used   Substance and Sexual Activity    Alcohol use: No    Drug use: No    Sexual activity: None   Lifestyle    Physical activity:     Days per week: None     Minutes per session: None    Stress: None   Relationships    Social connections:     Talks on phone: None     Gets together: None     Attends [employer] service: None     Active member of club or organization: None     Attends meetings of clubs or organizations: None     Relationship status: None    Intimate partner violence:     Fear of current or ex partner: None     Emotionally abused: None     Physically abused: None     Forced sexual activity: None   Other Topics Concern    None   Social History Narrative    None       SCREENINGS             PHYSICAL EXAM    (up to 7 for level 4, 8 or more for level 5)     ED Triage Vitals [04/24/19 1736]   BP Temp Temp Source Pulse Resp SpO2 Height Weight   (!) 168/86 98.2 °F (36.8 °C) Oral 112 22 98 % 5' 8\" (1.727 m) 200 lb (90.7 kg)       Physical Exam   Constitutional: She is oriented to person, place, and time. She appears well-developed and well-nourished. HENT:   Head: Normocephalic and atraumatic. Right Ear: External ear normal.   Left Ear: External ear normal.   Nose: Nose normal.   Eyes: Right eye exhibits no discharge. Left eye exhibits no discharge. Neck: Normal range of motion. Neck supple. No tracheal deviation present. Cardiovascular: Normal rate, regular rhythm and normal heart sounds. No murmur heard. Pulmonary/Chest: Effort normal and breath sounds normal. No stridor. No respiratory distress. She has no wheezes. Abdominal: Soft. Bowel sounds are normal. She exhibits no distension. There is no tenderness. There is no guarding. Musculoskeletal: Normal range of motion. There is no reproducible tenderness over the back. Motor strength of bilateral upper extremities is 5/5 throughout. Radial pulses are 2+ bilaterally   Neurological: She is alert and oriented to person, place, and time. Skin: Skin is warm. She is diaphoretic. Psychiatric: She has a normal mood and affect. Her behavior is normal.   Nursing note and vitals reviewed.       DIAGNOSTIC RESULTS   LABS:    Labs Reviewed   CBC WITH AUTO DIFFERENTIAL - Abnormal; Notable for the following components:       Result Value    RDW 16.2 (*)     All other components within normal limits    Narrative:     Performed at:  [facility]  [employer]   Phone [phone]   COMPREHENSIVE METABOLIC PANEL - Abnormal; Notable for the following components:    Glucose 159 (*)     BUN 22 (*)     GFR Non- 44 (*)     GFR  53 (*)     All other components within normal limits    Narrative:     Performed at:  [facility]  [employer]   Phone [phone]   LIPASE - Abnormal; Notable for the following components:    Lipase 72.0 (*)     All other components within normal limits    Narrative:     Performed at:  [facility]  [employer]   Phone [phone]   TROPONIN - Abnormal; Notable for the following components:    Troponin 0.02 (*)     All other components within normal limits    Narrative:     Performed at:  [facility]  [employer]   Phone [phone]  - Abnormal; Notable for the following components:    Pro-BNP 1,874 (*)     All other components within normal limits    Narrative:     Performed at:  [facility]  [employer] Phone [phone] - Abnormal; Notable for the following components:    Protime 33.8 (*)     INR 2.96 (*)     All other components within normal limits    Narrative:     Performed at:  [facility]. [facility], [address]   Phone [phone]   LACTIC ACID, PLASMA       All other labs were within normal range or not returned as of this dictation. EKG: All EKG's are interpreted by the Emergency Department Physician who either signs orCo-signs this chart in the absence of a cardiologist.  Please see their note for interpretation of EKG. RADIOLOGY:   Non-plain film images such as CT, Ultrasound and MRI are read by the radiologist. Plain radiographic images are visualized andpreliminarily interpreted by the  ED Provider with the below findings:        Interpretation [facility] Radiologist below, if available at the time of this note:    XR CHEST STANDARD (2 VW)    (Results Pending)   CTA CHEST W CONTRAST    (Results Pending)   CTA [address]    (Results Pending)     No results found.       PROCEDURES   Unless otherwise noted below, none     Procedures    CRITICAL CARE TIME   N/A    CONSULTS:  None      EMERGENCY DEPARTMENT COURSE and DIFFERENTIALDIAGNOSIS/MDM:   Vitals:    Vitals:    04/24/19 1736 04/24/19 1858   BP: (!) 168/86 (!) 161/77   Pulse: 112 101   Resp: 22 20   Temp: 98.2 °F (36.8 °C)    TempSrc: Oral    SpO2: 98% 97%   Weight: 200 lb (90.7 kg)    Height: 5' 8\" (1.727 m)        Patient was given thefollowing medications:  Medications   HYDROmorphone HCl PF (DILAUDID) injection 1 mg (1 mg Intravenous Given 4/24/19 1857)   0.9 % sodium chloride bolus (1,000 mLs Intravenous New Bag 4/24/19 1803)   HYDROmorphone HCl PF (DILAUDID) injection 1 mg (1 mg Intravenous Given 4/24/19 1803)   ondansetron (ZOFRAN) injection 4 mg (4 mg Intravenous Given 4/24/19 1803)       The patient presents to the emergency department today for evaluation for back pain. The patient tells me that she has a history of \"back issues\" and she states that for the past 3 days she has had worsening back pain. The patient states that it is to the middle of her upper back, and will radiate around to the right side of her chest.  Patient denies falling or injuring her back in any way, she is currently rating her pain as a 10/10 no known alleviating or aggravating factors. Patient is pacing around the room she is actively diaphoretic, , she states that she her pain has been constant, she otherwise denies radiculopathy of the pain. She denies any chest pain or shortness of breath. She does have a history of PE however she feels that this is different from PE she's had in the past.  She is on Coumadin. She states that she had open heart surgery in August.  She denies any fever or chills. She denies any cough or congestion. No urinary symptoms. No other complaints. Physical exam, there is no reproducible tenderness over the back however she is actively pacing the room and she is diaphoretic. CBC shows no evidence of leukocytosis or anemia. CMP is unremarkable. Her troponin is elevated at 0.02, her INR is 2.96. Remainder of workup is pending, she will likely need to be admitted, this marks the end of my shift. Please see attending note for details and final disposition    FINAL IMPRESSION      1. Acute midline thoracic back pain    2. Elevated troponin          DISPOSITION/PLAN   DISPOSITION        PATIENT REFERREDTO:  No follow-up provider specified.     DISCHARGE MEDICATIONS:  New Prescriptions    No medications on file       DISCONTINUED MEDICATIONS:  Discontinued Medications    No medications on file              (Please note that portions ofthis note were completed with a voice recognition program.  Efforts were made to edit the dictations but occasionally words are mis-transcribed.)    Roula Ventura PA-C (electronically signed)            Roula Ventura MICHAEL  04/24/19 6764

## 2019-04-24 NOTE — ED NOTES
Pt leaning over while standing at bottom of bed. Family surrounding her. Reports c/o severe pain to back.       Mary Willard RN  04/24/19 3041

## 2019-04-25 ENCOUNTER — APPOINTMENT (OUTPATIENT)
Dept: CT IMAGING | Age: 74
DRG: 552 | End: 2019-04-25
Payer: MEDICARE

## 2019-04-25 LAB
A/G RATIO: 1 (ref 1.1–2.2)
ALBUMIN SERPL-MCNC: 2.8 G/DL (ref 3.4–5)
ALP BLD-CCNC: 75 U/L (ref 40–129)
ALT SERPL-CCNC: 12 U/L (ref 10–40)
ANION GAP SERPL CALCULATED.3IONS-SCNC: 8 MMOL/L (ref 3–16)
AST SERPL-CCNC: 12 U/L (ref 15–37)
BILIRUB SERPL-MCNC: <0.2 MG/DL (ref 0–1)
BUN BLDV-MCNC: 22 MG/DL (ref 7–20)
CALCIUM SERPL-MCNC: 8.2 MG/DL (ref 8.3–10.6)
CHLORIDE BLD-SCNC: 108 MMOL/L (ref 99–110)
CO2: 26 MMOL/L (ref 21–32)
CREAT SERPL-MCNC: 0.9 MG/DL (ref 0.6–1.2)
EKG ATRIAL RATE: 96 BPM
EKG DIAGNOSIS: NORMAL
EKG P AXIS: -8 DEGREES
EKG P-R INTERVAL: 204 MS
EKG Q-T INTERVAL: 362 MS
EKG QRS DURATION: 92 MS
EKG QTC CALCULATION (BAZETT): 457 MS
EKG R AXIS: 63 DEGREES
EKG T AXIS: 64 DEGREES
EKG VENTRICULAR RATE: 96 BPM
ESTIMATED AVERAGE GLUCOSE: 182.9 MG/DL
GFR AFRICAN AMERICAN: >60
GFR NON-AFRICAN AMERICAN: >60
GLOBULIN: 2.8 G/DL
GLUCOSE BLD-MCNC: 101 MG/DL (ref 70–99)
GLUCOSE BLD-MCNC: 113 MG/DL (ref 70–99)
GLUCOSE BLD-MCNC: 138 MG/DL (ref 70–99)
GLUCOSE BLD-MCNC: 272 MG/DL (ref 70–99)
GLUCOSE BLD-MCNC: 371 MG/DL (ref 70–99)
HBA1C MFR BLD: 8 %
HCT VFR BLD CALC: 32.1 % (ref 36–48)
HEMOGLOBIN: 10.6 G/DL (ref 12–16)
INR BLD: 2.52 (ref 0.86–1.14)
MAGNESIUM: 1.9 MG/DL (ref 1.8–2.4)
MCH RBC QN AUTO: 28.9 PG (ref 26–34)
MCHC RBC AUTO-ENTMCNC: 33.1 G/DL (ref 31–36)
MCV RBC AUTO: 87.5 FL (ref 80–100)
PDW BLD-RTO: 16 % (ref 12.4–15.4)
PERFORMED ON: ABNORMAL
PLATELET # BLD: 171 K/UL (ref 135–450)
PMV BLD AUTO: 8.9 FL (ref 5–10.5)
POTASSIUM SERPL-SCNC: 3.6 MMOL/L (ref 3.5–5.1)
PROTHROMBIN TIME: 28.7 SEC (ref 9.8–13)
RBC # BLD: 3.67 M/UL (ref 4–5.2)
SODIUM BLD-SCNC: 142 MMOL/L (ref 136–145)
TOTAL PROTEIN: 5.6 G/DL (ref 6.4–8.2)
TROPONIN: <0.01 NG/ML
TROPONIN: <0.01 NG/ML
WBC # BLD: 5 K/UL (ref 4–11)

## 2019-04-25 PROCEDURE — 2060000000 HC ICU INTERMEDIATE R&B

## 2019-04-25 PROCEDURE — 72129 CT CHEST SPINE W/DYE: CPT

## 2019-04-25 PROCEDURE — 93010 ELECTROCARDIOGRAM REPORT: CPT | Performed by: INTERNAL MEDICINE

## 2019-04-25 PROCEDURE — 6360000002 HC RX W HCPCS: Performed by: INTERNAL MEDICINE

## 2019-04-25 PROCEDURE — 2580000003 HC RX 258: Performed by: HOSPITALIST

## 2019-04-25 PROCEDURE — 85027 COMPLETE CBC AUTOMATED: CPT

## 2019-04-25 PROCEDURE — 80053 COMPREHEN METABOLIC PANEL: CPT

## 2019-04-25 PROCEDURE — 97165 OT EVAL LOW COMPLEX 30 MIN: CPT

## 2019-04-25 PROCEDURE — 83735 ASSAY OF MAGNESIUM: CPT

## 2019-04-25 PROCEDURE — 85610 PROTHROMBIN TIME: CPT

## 2019-04-25 PROCEDURE — 36415 COLL VENOUS BLD VENIPUNCTURE: CPT

## 2019-04-25 PROCEDURE — 6370000000 HC RX 637 (ALT 250 FOR IP): Performed by: INTERNAL MEDICINE

## 2019-04-25 PROCEDURE — 84484 ASSAY OF TROPONIN QUANT: CPT

## 2019-04-25 PROCEDURE — 99221 1ST HOSP IP/OBS SF/LOW 40: CPT | Performed by: INTERNAL MEDICINE

## 2019-04-25 PROCEDURE — 6370000000 HC RX 637 (ALT 250 FOR IP): Performed by: HOSPITALIST

## 2019-04-25 PROCEDURE — 97530 THERAPEUTIC ACTIVITIES: CPT

## 2019-04-25 PROCEDURE — 97116 GAIT TRAINING THERAPY: CPT

## 2019-04-25 PROCEDURE — 83036 HEMOGLOBIN GLYCOSYLATED A1C: CPT

## 2019-04-25 PROCEDURE — 2500000003 HC RX 250 WO HCPCS: Performed by: EMERGENCY MEDICINE

## 2019-04-25 PROCEDURE — 97161 PT EVAL LOW COMPLEX 20 MIN: CPT

## 2019-04-25 PROCEDURE — 99222 1ST HOSP IP/OBS MODERATE 55: CPT | Performed by: SURGERY

## 2019-04-25 RX ORDER — WARFARIN SODIUM 5 MG/1
5 TABLET ORAL DAILY
Status: DISCONTINUED | OUTPATIENT
Start: 2019-04-25 | End: 2019-04-27

## 2019-04-25 RX ORDER — POTASSIUM CHLORIDE 20 MEQ/1
10 TABLET, EXTENDED RELEASE ORAL DAILY
Status: DISCONTINUED | OUTPATIENT
Start: 2019-04-25 | End: 2019-04-27 | Stop reason: HOSPADM

## 2019-04-25 RX ORDER — SPIRONOLACTONE 25 MG/1
25 TABLET ORAL DAILY
Status: DISCONTINUED | OUTPATIENT
Start: 2019-04-25 | End: 2019-04-27 | Stop reason: HOSPADM

## 2019-04-25 RX ORDER — DIGOXIN 125 MCG
125 TABLET ORAL DAILY
Status: DISCONTINUED | OUTPATIENT
Start: 2019-04-25 | End: 2019-04-27 | Stop reason: HOSPADM

## 2019-04-25 RX ORDER — DEXTROSE MONOHYDRATE 25 G/50ML
12.5 INJECTION, SOLUTION INTRAVENOUS PRN
Status: DISCONTINUED | OUTPATIENT
Start: 2019-04-25 | End: 2019-04-27 | Stop reason: HOSPADM

## 2019-04-25 RX ORDER — CARVEDILOL 3.12 MG/1
3.12 TABLET ORAL 2 TIMES DAILY WITH MEALS
Status: DISCONTINUED | OUTPATIENT
Start: 2019-04-25 | End: 2019-04-27 | Stop reason: HOSPADM

## 2019-04-25 RX ORDER — ONDANSETRON 2 MG/ML
4 INJECTION INTRAMUSCULAR; INTRAVENOUS EVERY 6 HOURS PRN
Status: DISCONTINUED | OUTPATIENT
Start: 2019-04-25 | End: 2019-04-27 | Stop reason: HOSPADM

## 2019-04-25 RX ORDER — ACETAMINOPHEN 325 MG/1
650 TABLET ORAL EVERY 4 HOURS PRN
Status: DISCONTINUED | OUTPATIENT
Start: 2019-04-25 | End: 2019-04-27 | Stop reason: HOSPADM

## 2019-04-25 RX ORDER — TORSEMIDE 20 MG/1
20 TABLET ORAL DAILY
Status: DISCONTINUED | OUTPATIENT
Start: 2019-04-25 | End: 2019-04-27 | Stop reason: HOSPADM

## 2019-04-25 RX ORDER — ROSUVASTATIN CALCIUM 20 MG/1
20 TABLET, COATED ORAL DAILY
Status: DISCONTINUED | OUTPATIENT
Start: 2019-04-25 | End: 2019-04-27 | Stop reason: HOSPADM

## 2019-04-25 RX ORDER — SODIUM CHLORIDE 9 MG/ML
INJECTION, SOLUTION INTRAVENOUS CONTINUOUS
Status: DISCONTINUED | OUTPATIENT
Start: 2019-04-25 | End: 2019-04-26

## 2019-04-25 RX ORDER — HYDROMORPHONE HYDROCHLORIDE 1 MG/ML
0.5 INJECTION, SOLUTION INTRAMUSCULAR; INTRAVENOUS; SUBCUTANEOUS EVERY 6 HOURS PRN
Status: DISCONTINUED | OUTPATIENT
Start: 2019-04-25 | End: 2019-04-25

## 2019-04-25 RX ORDER — DIAZEPAM 5 MG/1
5 TABLET ORAL EVERY 8 HOURS
Status: DISCONTINUED | OUTPATIENT
Start: 2019-04-25 | End: 2019-04-27 | Stop reason: HOSPADM

## 2019-04-25 RX ORDER — POTASSIUM CHLORIDE 7.45 MG/ML
10 INJECTION INTRAVENOUS PRN
Status: DISCONTINUED | OUTPATIENT
Start: 2019-04-25 | End: 2019-04-27 | Stop reason: HOSPADM

## 2019-04-25 RX ORDER — POTASSIUM CHLORIDE 20 MEQ/1
40 TABLET, EXTENDED RELEASE ORAL PRN
Status: DISCONTINUED | OUTPATIENT
Start: 2019-04-25 | End: 2019-04-27 | Stop reason: HOSPADM

## 2019-04-25 RX ORDER — NICOTINE POLACRILEX 4 MG
15 LOZENGE BUCCAL PRN
Status: DISCONTINUED | OUTPATIENT
Start: 2019-04-25 | End: 2019-04-27 | Stop reason: HOSPADM

## 2019-04-25 RX ORDER — HYDROMORPHONE HYDROCHLORIDE 1 MG/ML
1 INJECTION, SOLUTION INTRAMUSCULAR; INTRAVENOUS; SUBCUTANEOUS
Status: DISCONTINUED | OUTPATIENT
Start: 2019-04-25 | End: 2019-04-27 | Stop reason: HOSPADM

## 2019-04-25 RX ORDER — DEXAMETHASONE SODIUM PHOSPHATE 4 MG/ML
4 INJECTION, SOLUTION INTRA-ARTICULAR; INTRALESIONAL; INTRAMUSCULAR; INTRAVENOUS; SOFT TISSUE EVERY 6 HOURS
Status: DISCONTINUED | OUTPATIENT
Start: 2019-04-25 | End: 2019-04-27 | Stop reason: HOSPADM

## 2019-04-25 RX ORDER — DEXTROSE MONOHYDRATE 50 MG/ML
100 INJECTION, SOLUTION INTRAVENOUS PRN
Status: DISCONTINUED | OUTPATIENT
Start: 2019-04-25 | End: 2019-04-27 | Stop reason: HOSPADM

## 2019-04-25 RX ORDER — SODIUM CHLORIDE 0.9 % (FLUSH) 0.9 %
10 SYRINGE (ML) INJECTION PRN
Status: DISCONTINUED | OUTPATIENT
Start: 2019-04-25 | End: 2019-04-27 | Stop reason: HOSPADM

## 2019-04-25 RX ORDER — ASPIRIN 81 MG/1
81 TABLET, CHEWABLE ORAL DAILY
Status: DISCONTINUED | OUTPATIENT
Start: 2019-04-25 | End: 2019-04-27 | Stop reason: HOSPADM

## 2019-04-25 RX ORDER — PANTOPRAZOLE SODIUM 40 MG/1
40 TABLET, DELAYED RELEASE ORAL
Status: DISCONTINUED | OUTPATIENT
Start: 2019-04-25 | End: 2019-04-27 | Stop reason: HOSPADM

## 2019-04-25 RX ORDER — SODIUM CHLORIDE 0.9 % (FLUSH) 0.9 %
10 SYRINGE (ML) INJECTION EVERY 12 HOURS SCHEDULED
Status: DISCONTINUED | OUTPATIENT
Start: 2019-04-25 | End: 2019-04-27 | Stop reason: HOSPADM

## 2019-04-25 RX ORDER — MONTELUKAST SODIUM 10 MG/1
10 TABLET ORAL NIGHTLY
Status: DISCONTINUED | OUTPATIENT
Start: 2019-04-25 | End: 2019-04-27 | Stop reason: HOSPADM

## 2019-04-25 RX ORDER — MAGNESIUM SULFATE 1 G/100ML
1 INJECTION INTRAVENOUS PRN
Status: DISCONTINUED | OUTPATIENT
Start: 2019-04-25 | End: 2019-04-27 | Stop reason: HOSPADM

## 2019-04-25 RX ADMIN — CARVEDILOL 3.12 MG: 3.12 TABLET, FILM COATED ORAL at 16:59

## 2019-04-25 RX ADMIN — PANTOPRAZOLE SODIUM 40 MG: 40 TABLET, DELAYED RELEASE ORAL at 09:22

## 2019-04-25 RX ADMIN — Medication 10 ML: at 09:22

## 2019-04-25 RX ADMIN — POTASSIUM CHLORIDE 10 MEQ: 1500 TABLET, EXTENDED RELEASE ORAL at 13:11

## 2019-04-25 RX ADMIN — DIAZEPAM 5 MG: 5 TABLET ORAL at 17:00

## 2019-04-25 RX ADMIN — DIGOXIN 125 MCG: 125 TABLET ORAL at 09:22

## 2019-04-25 RX ADMIN — ACETAMINOPHEN 650 MG: 325 TABLET, FILM COATED ORAL at 06:10

## 2019-04-25 RX ADMIN — ROSUVASTATIN CALCIUM 20 MG: 20 TABLET, FILM COATED ORAL at 20:38

## 2019-04-25 RX ADMIN — DEXAMETHASONE SODIUM PHOSPHATE 4 MG: 4 INJECTION, SOLUTION INTRAMUSCULAR; INTRAVENOUS at 21:38

## 2019-04-25 RX ADMIN — DEXAMETHASONE SODIUM PHOSPHATE 4 MG: 4 INJECTION, SOLUTION INTRAMUSCULAR; INTRAVENOUS at 17:00

## 2019-04-25 RX ADMIN — SODIUM CHLORIDE: 9 INJECTION, SOLUTION INTRAVENOUS at 02:32

## 2019-04-25 RX ADMIN — MONTELUKAST SODIUM 10 MG: 10 TABLET, FILM COATED ORAL at 20:38

## 2019-04-25 RX ADMIN — INSULIN LISPRO 5 UNITS: 100 INJECTION, SOLUTION INTRAVENOUS; SUBCUTANEOUS at 21:38

## 2019-04-25 RX ADMIN — INSULIN LISPRO 3 UNITS: 100 INJECTION, SOLUTION INTRAVENOUS; SUBCUTANEOUS at 17:01

## 2019-04-25 RX ADMIN — CARVEDILOL 3.12 MG: 3.12 TABLET, FILM COATED ORAL at 09:23

## 2019-04-25 RX ADMIN — WARFARIN SODIUM 5 MG: 5 TABLET ORAL at 17:00

## 2019-04-25 RX ADMIN — Medication 10 ML: at 17:00

## 2019-04-25 RX ADMIN — DEXAMETHASONE SODIUM PHOSPHATE 4 MG: 4 INJECTION, SOLUTION INTRAMUSCULAR; INTRAVENOUS at 10:06

## 2019-04-25 RX ADMIN — DIAZEPAM 5 MG: 5 TABLET ORAL at 10:06

## 2019-04-25 RX ADMIN — Medication 10 ML: at 21:30

## 2019-04-25 RX ADMIN — SPIRONOLACTONE 25 MG: 25 TABLET ORAL at 09:23

## 2019-04-25 RX ADMIN — TORSEMIDE 20 MG: 20 TABLET ORAL at 09:23

## 2019-04-25 RX ADMIN — ASPIRIN 81 MG 81 MG: 81 TABLET ORAL at 09:22

## 2019-04-25 RX ADMIN — HYDROMORPHONE HYDROCHLORIDE 1 MG: 1 INJECTION, SOLUTION INTRAMUSCULAR; INTRAVENOUS; SUBCUTANEOUS at 00:34

## 2019-04-25 ASSESSMENT — PAIN DESCRIPTION - LOCATION
LOCATION: BACK

## 2019-04-25 ASSESSMENT — PAIN SCALES - GENERAL
PAINLEVEL_OUTOF10: 4
PAINLEVEL_OUTOF10: 0
PAINLEVEL_OUTOF10: 5
PAINLEVEL_OUTOF10: 6
PAINLEVEL_OUTOF10: 5
PAINLEVEL_OUTOF10: 4

## 2019-04-25 ASSESSMENT — PAIN DESCRIPTION - PAIN TYPE: TYPE: ACUTE PAIN

## 2019-04-25 ASSESSMENT — PAIN DESCRIPTION - ORIENTATION
ORIENTATION: RIGHT;LEFT;LOWER
ORIENTATION: RIGHT;LEFT;LOWER

## 2019-04-25 ASSESSMENT — PAIN DESCRIPTION - DESCRIPTORS: DESCRIPTORS: ACHING

## 2019-04-25 NOTE — H&P
_    [facility]    4/24/2019 9:06 PM        Patient Information:  Taj Pina is a 68 y.o. female [id number]    PCP:  Guera Palmer MD (Tel: [phone] )        Date of Service:   Pt seen/examined on 4/24/2019   Admitted to Inpatient with expected LOS greater than two midnights due to medical therapy. Chief complaint:    Chief Complaint   Patient presents with    Back Pain     Pt reports back pain in her lower back x 6 days. Hx of arthritis. Pt took pain meds at 3 then 5pm without relief. Has UTI. History of Present Illness:  Kiana Rico is a 68 y.o. female who presented with   · Lower back pain X 6 days . Pain starts in mid spine region near the Bra strap region and then radiates B/L around the lower rib cage anteriorly . Endorses it as a sharp pain . Worsening in intensity in last 6 days or so   · No N/V/D   · Last BM yesterday   · Takes stool softeners @ Home d/t constipation issues   · No chest pressure or tightness or palpitations reported anteriorly . No jaw pain  . No left shoulder pain   · On blood thinners @ Home for AFIB . Is on coumadin @ Home       History obtained from   · Patient      · Prior chart  As well       So far, ED Findings/Workup/Labs shows :   · BP. HTN   · HR. Tachycardia    · Saturations. Stable   · Temperature. Afebrile       · Imaging done in ED as below. And is/are s/o multiple findings . ? Ileus vs early SBO   · Pertinent Abnormal Labs and assessment as below.        While in ED, patient care and medications given :   · Imaging  CXR +  CTA  , done in ED   · CTA done in ED   · ASA X 1   · Dilaudid   · Zofran   · IVF/NS       Currently, on my evaluation, patient is :   · Since arrival, post above Rx, patient reports improvement s/p pain medications in ED   · No chest pain   · Is AO X 3   ·  @ bedside   · No s/o distress noted         REVIEW OF SYSTEMS:     Constitutional:  Negative for fever, chills or night sweats  ENT:   Negative for rhinorrhea, epistaxis, hoarseness, sore throat. Respiratory:   Negative for shortness of breath, wheezing  Cardiovascular:   Negative for  chest pain, palpitations   Gastrointestinal:   Negative for nausea, vomiting, diarrhea  Genitourinary:   Negative for polyuria, dysuria   Hematologic/Lymphatic:   Negative for  bleeding tendency, easy bruising  Musculoskeletal:  As above   Neurologic:   Negative for  Confusion, dysarthria. Skin:   Negative for itching,rash  Psychiatric:  Negative for depression, anxiety, agitation. Endocrine:  Negative for polydipsia, polyuria,heat /cold intolerance. Past Medical History:         has a past medical history of Asthma, Atrial fibrillation ([facility] [address] Eosinophilia, Hemoptysis, HIGH CHOLESTEROL, Hx of blood clots, Hypertension, Irregular heart beat, Other specified gastritis without mention of hemorrhage, Palpitations, Skin cancer, and Type II or unspecified type diabetes mellitus without mention of complication, not stated as uncontrolled. Past Surgical History:         has a past surgical history that includes Cholecystectomy;  section; Colonoscopy (2017); skin biopsy; bronchoscopy (2016); Coronary artery bypass graft (2018); Mitral valve replacement (2018); transesophageal echocardiogram (2018); Tunneled venous catheter placement (Left, 2018); Cardiac catheterization (2018); Insertable Cardiac Monitor (Left, 2018); Colonoscopy (2014); Hysterectomy; Upper gastrointestinal endoscopy (N/A, 10/10/2018); and Colonoscopy (10/10/2018).        Medications:      Current Outpatient Medications on File Prior to Encounter   Medication Sig Dispense Refill    nitrofurantoin, macrocrystal-monohydrate, (MACROBID) 100 MG capsule Take 1 capsule by mouth 2 times daily for 10 days 10 capsule 0    terbinafine (LAMISIL) 250 MG tablet TAKE 1 TABLET DAILY 90 tablet 0    colchicine (COLCRYS) 0.6 MG tablet Take 1 tablet by mouth 2 times daily 20 tablet 0    warfarin (COUMADIN) 5 MG tablet Take 1 tablet by mouth daily 100 tablet 3    montelukast (SINGULAIR) 10 MG tablet TAKE 1 TABLET NIGHTLY 90 tablet 0    predniSONE (DELTASONE) 10 MG tablet TAKE 1 TABLET AS NEEDED (EOSINOPHILIC GASTRITIS) 832 tablet 3    rosuvastatin (CRESTOR) 20 MG tablet Take 1 tablet by mouth daily 90 tablet 0    BD PEN NEEDLE JANNET U/F 32G X 4 MM MISC USE 1 PEN NEEDLE FOUR TIMES A  each 3    insulin glargine (LANTUS) 100 UNIT/ML injection pen Inject 55 Units into the skin every morning 30 pen 3    polyethylene glycol (GLYCOLAX) powder FILL DOSING CUP TO MARKED LINE (17 GRAMS) THEN MIX IN LIQUID AND DRINK DAILY 1581 g 3    torsemide (DEMADEX) 20 MG tablet Take 1 tablet by mouth daily 90 tablet 1    dicyclomine (BENTYL) 10 MG capsule Take 1 capsule by mouth 4 times daily 120 capsule 5    OxyCODONE HCl 5 MG TABA Take 5 mg by mouth 4 times daily as needed. Di Sharp exenatide (BYETTA 10 MCG PEN) 10 MCG/0.04ML injection Inject 0.04 mLs into the skin 2 times daily (with meals) 3 pen 3    digoxin (LANOXIN) 125 MCG tablet Take 1 tablet by mouth daily 90 tablet 1    carvedilol (COREG) 3.125 MG tablet Take 1 tablet by mouth 2 times daily (with meals) 180 tablet 1    aspirin 81 MG chewable tablet Take 1 tablet by mouth daily 30 tablet 3    omeprazole (PRILOSEC) 40 MG delayed release capsule Take 1 capsule by mouth every morning (before breakfast) (Patient taking differently: Take 40 mg by mouth daily as needed ) 30 capsule 5    ondansetron (ZOFRAN) 4 MG tablet Take 1 tablet by mouth 3 times daily as needed for Nausea or Vomiting 30 tablet 0    potassium chloride (KLOR-CON M) 10 MEQ extended release tablet Take 1 tablet by mouth daily 90 tablet 1    spironolactone (ALDACTONE) 25 MG tablet Take 1 tablet by mouth daily 90 tablet 1    vitamin B-12 500 MCG tablet Take 1 tablet by mouth daily 30 tablet 3    Blood Glucose Monitoring Suppl alcohol or use drugs. Family History:            Problem Relation Age of Onset    Cancer Father     Asthma Mother     Hypertension Mother     Heart Disease Mother     High Blood Pressure Mother            Physical Exam:  BP (!) 161/77   Pulse 102   Temp 98.2 °F (36.8 °C) (Oral)   Resp 20   Ht 5' 8\" (1.727 m)   Wt 200 lb (90.7 kg)   SpO2 95%   BMI 30.41 kg/m²     General appearance: Appears  comfortable. Well nourished   Eyes:  Sclera clear, pupils equal  ENT:  Moist mucus membranes, Trachea midline. Cardiovascular:  Regular rhythm, normal S1, S2. Systolic Murmur + . No edema in lower extremities   Respiratory:  Noted Clear to auscultation bilaterally,  No wheeze, good inspiratory effort   Gastrointestinal:  Abdomen soft,  non-tender,  not distended,  normal bowel sounds   Musculoskeletal:  No cyanosis in digits, neck supple  Neurology:  Cranial nerves grossly intact. Alert and oriented in time, place and person. No speech or motor deficits   Psychiatry:  Appropriate affect.  Not agitated  Skin:  Warm, dry, normal turgor, no rash       Labs:     Recent Labs     04/24/19  1806   WBC 7.0   HGB 12.8   HCT 39.3        Recent Labs     04/24/19  1806      K 4.4      CO2 24   BUN 22*   CREATININE 1.2   CALCIUM 8.9     Recent Labs     04/24/19  1806   AST 21   ALT 16   BILITOT 0.4   ALKPHOS 94     Recent Labs     04/24/19  1806   INR 2.96*     Recent Labs     04/24/19  1806   TROPONINI 0.02*         Urinalysis:      Lab Results   Component Value Date    NITRU Negative 04/24/2019    WBCUA 8 01/15/2019    BACTERIA negative 04/24/2019    BACTERIA RARE 11/06/2018    RBCUA negative 04/24/2019    RBCUA 1 01/15/2019    BLOODU Negative 04/24/2019    SPECGRAV 1.010 04/24/2019    GLUCOSEU negative 04/24/2019         Radiology:     CXR:   I have reviewed the CXR  No acute or concerning findings/pathology noted on review    EKG/Telemonitor :    I have reviewed the EKG  SR   PVCs +     IMAGING : CTA ABDOMEN PELVIS W CONTRAST   Final Result   1. Moderate diffuse atherosclerotic disease. No evidence for aortic aneurysm   or dissection. 2. Enlarged pulmonary trunk noted which can be seen in pulmonary hypertension. 3. Stable sub 5 mm lung nodules described in detail above. No follow-up   imaging recommended. These dates back to July 2016.   4. Cardiomegaly. 5. A few mildly dilated left mid abdomen small bowel loops without transition   point. Recommend close observation and follow-up to exclude developing ileus   or obstruction. 6. Unchanged 8.2 x 6.0 cm left adnexal cystic mass. Recommend follow-up   pelvic MRI with IV contrast or surgical evaluation. CTA CHEST W CONTRAST   Final Result   1. Moderate diffuse atherosclerotic disease. No evidence for aortic aneurysm   or dissection. 2. Enlarged pulmonary trunk noted which can be seen in pulmonary hypertension. 3. Stable sub 5 mm lung nodules described in detail above. No follow-up   imaging recommended. These dates back to July 2016.   4. Cardiomegaly. 5. A few mildly dilated left mid abdomen small bowel loops without transition   point. Recommend close observation and follow-up to exclude developing ileus   or obstruction. 6. Unchanged 8.2 x 6.0 cm left adnexal cystic mass. Recommend follow-up   pelvic MRI with IV contrast or surgical evaluation. XR CHEST STANDARD (2 VW)   Final Result   No acute cardiopulmonary disease.                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] 09/07/2018    S/P mitral valve replacement with bioprosthetic valve [Z95.3] 08/22/2018    Status post aorto-coronary artery bypass graft [Z95.1] 08/22/2018    S/P Maze operation for atrial fibrillation [Z98.890, Z86.79] 08/22/2018    CAD (coronary artery disease) [I25.10]     Non-rheumatic mitral regurgitation [I34.0]     Cardiac arrhythmia [I49.9]     Atrial fibrillation ([address] [I48.91]     Hypertension [I10]     Multiple pulmonary nodules [R91.8] 08/01/2016    Supratherapeutic INR [R79.1]     History of pulmonary embolism [Z86.711]     Essential hypertension [I10] 03/25/2013    Long term current use of anticoagulant [Z79.01] 12/19/2012    Mixed hyperlipidemia [E78.2] 12/19/2012         · Back pain and upper Abdominal pain? ?  : CTA done in ED . Symptomatic  Rx as per orders for now . NPO for now . Likely MSK vs Spinal origin. ? DDD related . Pain control prn   · Detectable cardiac enzymes ? Atypical angina , r/o ACS : Will continue cardiac monitor while inpatient. Admission EKG reviewed. EKG prn chest pain. Cardiac enzymes on admission in ED is 0.02 . Will trend cardiac enzymes further while inpatient. Fasting lipid panel in AM. => Medication Plan : ASA PO . + Resumed Home medications . Cardiology c/sed in AM for further recommendations . INR 2.9 [ is on coumadin @ Home . Deferred Lovenox or full dose heparin for now ]   · Abnormal CT . ? Suspected early SBO vs Ileus : NPO . Sx c/sed . ? NGT prn if started N/V   · Known AFIB : Resumed home medications of Coreg + Digoxin . Is on coumadin @ Home . Holding for now d/t INR on higher side of target range and Sx c/s is pending . Can resume when situation is more clear in AM from Sx side . · Long term use of OACs : is on coumadin @ Home . INR 2.9 on higher side of target range . Holding coumadin tonight .  Will need resumed once plans clear from Sx in AM [ ? Surgical vs Non surgical ]   · H/o CAD s/p CABG in past in 2018   · S/p MVR in past   · H/o MAZE procedure in past   · Essential Hypertension : Resumed home medications of Coreg   · DM II : Inpatient diet as per orders. While inpatient, will closely monitor blood sugars and medicate with insulin per orders. Will adjust medications while IP for optimal control of symptoms/BS , as needed, depending on Blood sugars progression while in house. Rx of Hypoglycemia prn , per order sets. · Mixed Hyperlipidemia : Resumed home medications of statins   · Chronic combined CHF : No active issues currently. Stable. Resumed home medications of Torsemide + ALdactone + Coreg   · GERD : Resumed home medications of PPI   · PAH +   · Left adnexal cystic mass : needs further evaluation once acute issues resolved . Defer to rounding MD in AM   · Stable lung nodules       · Home medications for Chronic medical problems reviewed. · Home medications Held/Resumed, and Pertinent changes made in home medications on admission, as needed, according to current medical status, as mentioned above. Please see EPIC orders for detailed recommendations of plan and medications       · DVT Prophylaxis : is on coumadin @ [facility] + SCDs   · GI Prophylaxis :  PPI  as per orders   · Diet : NPO       · Code status : FULL   · PT/OT and ambulatory Eval Status:  Ambulate with Assist   · Probable LOS & future Disposition planned post discharge  - Home in 2-3 days       Medical Decision Making : HIGH     Total patient Care time spent in evaluating the patient an discussing plan with appropriate staff/patient/family members is 61 min       Ursula Eisenmenger, MD    Hospitalist, [facility].    4/24/2019 10:19 PM

## 2019-04-25 NOTE — PLAN OF CARE
Problem: Pain:  Goal: Control of chronic pain  Description  Control of chronic pain  Outcome: Ongoing  Note:   Pt affirms current pain level 5/10 to mid/low back, radiating to post lower rib lines  Goal: Patient's pain/discomfort is manageable  Description  Patient's pain/discomfort is manageable  Outcome: Ongoing  Note:   Dr Mac Hood. Gregory Winter discussed pain management with pt, and recommended testing to evaluate T-Spine region     Problem: Daily Care:  Goal: Daily care needs are met  Description  Daily care needs are met  Outcome: Ongoing  Note:   Pt generally independent in ADL's     Problem: Cardiovascular  Goal: No DVT, peripheral vascular complications  Outcome: Ongoing  Note:   No calf tenderness, edema, localized tenderness evident  Goal: Hemodynamic stability  Outcome: Ongoing  Note:   BP, HR, Temp, RR, RA O2 sat WNL     Problem: Respiratory  Goal: No pulmonary complications  Outcome: Ongoing  Note:   Breath sounds clear T/O with DB effort     Problem: GI  Goal: No bowel complications  Outcome: Ongoing  Note:   Bowel sounds present; pt affirms passed small BM this AM     Problem:   Goal: Adequate urinary output  Outcome: Ongoing  Note:   Denies dysuria at present, but states has had mild pain, burning with urination recently     Problem: KNOWLEDGE DEFICIT  Goal: Patient/S.O. demonstrates understanding of disease process, treatment plan, medications, and discharge instructions.   Outcome: Ongoing  Note:   Instructed on current plan to advance diet to full liquid pending further eval of poss ileus

## 2019-04-25 NOTE — PROGRESS NOTES
Consumed full liquid lunch with no s/s of abdominal distress; affirms chronic back discomfort remains tolerable, but will notify staff if pain increases to level requiring intervention; spouse at bedside.

## 2019-04-25 NOTE — PLAN OF CARE
Problem: Pain:  Goal: Control of chronic pain  Description  Control of chronic pain  Outcome: Ongoing  Note:   Pt affirms current pain level 5/10 to mid/low back, radiating to post lower rib lines

## 2019-04-25 NOTE — PROGRESS NOTES
Tolerates PO full liquid diet; no significant changes since earlier assessment, except as noted; no s/s of acute distress at present.

## 2019-04-25 NOTE — PROGRESS NOTES
Physical Therapy    Facility/Department: [address]  Initial Assessment/DC summary    NAME: Ananda Hercules  : 1945  MRN: [id number]    Date of Service: 2019    Discharge Recommendations:Blanca August scored a 23/24 on the AM-PAC short mobility form. At this time, no further PT is recommended upon discharge due to independence. Recommend patient returns to prior setting with prior services. PT Equipment Recommendations  Equipment Needed: No    Assessment   Assessment: Pt with no additional PT needs at this time. Safe for DC home when ready. Prognosis: Good  Decision Making: Low Complexity  Patient Education: PT POC  REQUIRES PT FOLLOW UP: No  Activity Tolerance  Activity Tolerance: Patient Tolerated treatment well       Patient Diagnosis(es): The primary encounter diagnosis was Acute midline thoracic back pain. Diagnoses of Elevated troponin, Adnexal mass, Pulmonary nodule, Abnormal CT scan, small bowel, and Adequate anticoagulation on anticoagulant therapy were also pertinent to this visit. has a past medical history of Asthma, Atrial fibrillation ([address] Eosinophilia, Hemoptysis, HIGH CHOLESTEROL, Hx of blood clots, Hypertension, Irregular heart beat, Other specified gastritis without mention of hemorrhage, Palpitations, Skin cancer, and Type II or unspecified type diabetes mellitus without mention of complication, not stated as uncontrolled. has a past surgical history that includes Cholecystectomy;  section; Colonoscopy (2017); skin biopsy; bronchoscopy (2016); Coronary artery bypass graft (2018); Mitral valve replacement (2018); transesophageal echocardiogram (2018); Tunneled venous catheter placement (Left, 2018); Cardiac catheterization (2018); Insertable Cardiac Monitor (Left, 2018); Colonoscopy (2014); Hysterectomy;  Upper gastrointestinal endoscopy (N/A, 10/10/2018); and Colonoscopy (10/10/2018). Restrictions  Restrictions/Precautions  Restrictions/Precautions: Fall Risk(low, NPO currently)  Position Activity Restriction  Other position/activity restrictions: Pt reports back pain in her lower back x 6 days. Hx of arthritis. Pt took pain meds at 3 then 5pm without relief. Has UTI. Vision/Hearing  Vision: Impaired  Vision Exceptions: Wears glasses for reading  Hearing: Within functional limits     Subjective  General  Chart Reviewed: Yes  Additional Pertinent Hx: back pain past several days  Family / Caregiver Present: Yes(, OT for eval)  Diagnosis: abdominal pain  Follows Commands: Within Functional Limits  General Comment  Comments: Pt supine in bed upon arrival.   Subjective  Subjective: Pt reports pain in abdomen. Reports 4/10 pain. Pain Screening  Patient Currently in Pain: Yes  Pain Assessment  Pain Assessment: 0-10  Pain Level: 4  Pain Location: Back  Pain Orientation: Right;Left;Lower  Vital Signs  Patient Currently in Pain: Yes       Orientation  Orientation  Overall Orientation Status: Within Functional Limits  Social/Functional History  Social/Functional History  Lives With: Spouse  Type of Home: House  Home Layout: Two level, Laundry in basement  Home Access: Stairs to enter with rails(3 HARLEY with railing, then 1 HARLEY house. )  Bathroom Shower/Tub: Tub/Shower unit  [employer]: Hand-held shower, Grab bars in shower, Toilet raiser  Bathroom Accessibility: Not accessible  ADL Assistance: Independent  Homemaking Assistance: Independent  Homemaking Responsibilities: Yes(shares with )  Ambulation Assistance: Independent  Transfer Assistance: Independent  Active : Yes  Leisure & Hobbies: reading  Additional Comments: Pt denies falls in past 6 mo. Amb with no AD normally.   Cognition        Objective     Observation/Palpation  Posture: Good    AROM RLE (degrees)  RLE AROM: WFL  AROM LLE (degrees)  LLE AROM : WFL  Strength RLE  Comment: grossly +4/5  Strength LLE  Comment: grossly +4/5     Sensation  Overall Sensation Status: WFL  Bed mobility  Supine to Sit: Independent  Sit to Supine: (Pt remained in chair at end of treatment. )  Scooting: Independent  Transfers  Sit to Stand: Independent  Stand to sit: Independent  Ambulation  Ambulation?: Yes  Ambulation 1  Surface: level tile  Device: No Device  Other Apparatus: (IV)  Assistance: Independent  Quality of Gait: steady gait  Distance: Pt amb 300 ft with no AD and independent. Comments: Pt tolerated well. Stairs/Curb  Stairs?: No     Balance  Posture: Good  Sitting - Static: Good  Sitting - Dynamic: Good  Standing - Static: Good  Standing - Dynamic: Good        Plan   Plan  Times per week: No additional PT needs. Safety Devices  Type of devices: Call light within reach, Nurse notified, Left in chair  Restraints  Initially in place: No    G-Code       OutComes Score                                                  AM-PAC Score  AM-PAC Inpatient Mobility Raw Score : 23  AM-PAC Inpatient T-Scale Score : 56.93  Mobility Inpatient CMS 0-100% Score: 11.2  Mobility Inpatient CMS G-Code Modifier : CI          Goals  Patient Goals   Patient goals : Pt with no PT needs at this time.         Therapy Time   Individual Concurrent Group Co-treatment   Time In 9316         Time Out 1022         Minutes 23         Timed Code Treatment Minutes: Jyotsna Walton 133, [id number]

## 2019-04-25 NOTE — PROGRESS NOTES
Occupational Therapy   Occupational Therapy Initial Assessment/Discharge Summary  Date: 2019   Patient Name: Daljit Moreno  MRN: [id number]     : 1945    Date of Service: 2019    Discharge Recommendations: Daljit Moreno scored a 24/24 on the AM-PAC ADL Inpatient form. At this time, no further OT is recommended upon discharge due to patient near baseline function at this time. Recommend patient returns to prior setting with prior services. OT Equipment Recommendations  Equipment Needed: No    Assessment   Assessment: Patient at baseline function; no OT indicated at this time  Prognosis: Good  Decision Making: Low Complexity  Patient Education: Role of OT, POC, discharge, evaluation  REQUIRES OT FOLLOW UP: No  Activity Tolerance  Activity Tolerance: Patient Tolerated treatment well  Safety Devices  Safety Devices in place: Yes  Type of devices: All fall risk precautions in place; Left in chair;Nurse notified;Call light within reach(low fall risk)           Patient Diagnosis(es): The primary encounter diagnosis was Acute midline thoracic back pain. Diagnoses of Elevated troponin, Adnexal mass, Pulmonary nodule, Abnormal CT scan, small bowel, and Adequate anticoagulation on anticoagulant therapy were also pertinent to this visit. has a past medical history of Asthma, Atrial fibrillation ([facility] [address] Eosinophilia, Hemoptysis, HIGH CHOLESTEROL, Hx of blood clots, Hypertension, Irregular heart beat, Other specified gastritis without mention of hemorrhage, Palpitations, Skin cancer, and Type II or unspecified type diabetes mellitus without mention of complication, not stated as uncontrolled. has a past surgical history that includes Cholecystectomy;  section; Colonoscopy (2017); skin biopsy; bronchoscopy (2016); Coronary artery bypass graft (2018); Mitral valve replacement (2018); transesophageal echocardiogram (2018);  Tunneled venous catheter placement (Left, 08/23/2018); Cardiac catheterization (08/16/2018); Insertable Cardiac Monitor (Left, 08/16/2018); Colonoscopy (01/17/2014); Hysterectomy; Upper gastrointestinal endoscopy (N/A, 10/10/2018); and Colonoscopy (10/10/2018). Restrictions  Restrictions/Precautions  Restrictions/Precautions: Fall Risk(low, NPO currently)  Position Activity Restriction  Other position/activity restrictions: Pt reports back pain in her lower back x 6 days. Hx of arthritis. Pt took pain meds at 3 then 5pm without relief. Has UTI. Subjective   General  Chart Reviewed: Yes  Diagnosis: Abdominal/back pain  Subjective  Subjective: Patient supine in bed at time of therapist arrival; pleasant and agreeable to evaluation with min encouragement  Pain Assessment  Pain Assessment: 0-10  Pain Level: 4  Pain Location: Back  Pain Orientation: Right;Left;Lower; RN aware    Social/Functional History  Social/Functional History  Lives With: Spouse  Type of Home: House  Home Layout: Two level, Laundry in basement  Home Access: Stairs to enter with rails(3 HARLEY with railing, then 1 HARLEY house. )  Bathroom Shower/Tub: Tub/Shower unit  Bathroom Equipment: Hand-held shower, Grab bars in shower, Toilet raiser  Bathroom Accessibility: Not accessible  ADL Assistance: Independent  Homemaking Assistance: Independent  Homemaking Responsibilities: Yes(shares with )  Ambulation Assistance: Independent  Transfer Assistance: Independent  Active : Yes  Leisure & Hobbies: reading  Additional Comments: Pt denies falls in past 6 mo. Amb with no AD normally.        Objective   Vision: Impaired  Vision Exceptions: Wears glasses for reading  Hearing: Within functional limits    Orientation  Overall Orientation Status: Within Functional Limits  Observation/Palpation  Posture: Good  Balance  Sitting Balance: Independent  Standing Balance: Independent  Functional Mobility  Functional - Mobility Device: No device  Activity: Other  Assist Level: Independent  Functional Mobility Comments: fxl mob in/out of room, around unit ~300' total  ADL  LE Dressing: (pt demonstrated ROM required for managing socks seated in recliner)  Additional Comments: declined additional ADL  Tone RUE  RUE Tone: Normotonic  Tone LUE  LUE Tone: Normotonic  Coordination  Movements Are Fluid And Coordinated: Yes     Bed mobility  Supine to Sit: Independent  Sit to Supine: (Pt remained in chair at end of treatment. )  Scooting: Independent  Transfers  Sit to stand: Independent(from bed x1)  Stand to sit:  Independent(to chair x1)     Cognition  Overall Cognitive Status: WFL        Sensation  Overall Sensation Status: WFL        LUE AROM (degrees)  LUE AROM : WFL  RUE AROM (degrees)  RUE AROM : WFL  LUE Strength  Gross LUE Strength: WFL  L Hand Grasp: 5/5  RUE Strength  Gross RUE Strength: WFL  R Hand Grasp: 5/5         Plan   Plan  Times per week: eval/dc            Therapy Time   Individual Concurrent Group Co-treatment   Time In 8155         Time Out 8034         Minutes 23              Timed Code Treatment Minutes:  8 Minutes    Total Treatment Minutes:  23 minutes    GEOVANNY Huynh OTR/L [id number]    Kia Bartlett, OT

## 2019-04-25 NOTE — CARE COORDINATION
Discharge Planning Assessment  RN/SW discharge planner met with patient/(and family member) to discuss reason for admission, current living situation, and potential needs at the time of discharge     Demographics/Insurance verified Yes/No     Current type of dwellin[address] [place] [id number]     Living arrangements: home with spouse     Level of function/Support: I-PTA     DME: none     Active with any community resources/agencies/skilled home care: [facility]   [facility], [place], [phone], fax [phone]     Medication compliance issues:     Financial issues that could impact healthcare:     Transportation at the time of discharge: spouse     Tentative discharge plan: Met with patient to offer assistance with discharge, and will refer to [facility] pending a home care order.
medications?:  I always take them as prescribed. Housing Review  Who do you live with?:  Partner/Spouse/SO  Are you an active caregiver in your home?:  No  Social Support  Do you have a ?:  No  Do you have a [facility]?:  No  Durable Medical Equipment  Patient DME:  Shower chair, Straight cane, Walker  Functional Review  Ability to seek help/take action for Emergent/Urgent situations i.e. fire, crime, inclement weather or health crisis. :  Independent  Ability handle personal hygiene needs (bathing/dressing/grooming): Independent  Ability to manage medications: Independent  Ability to prepare food:  Needs Assistance  Ability to maintain home (clean home, laundry):  Needs Assistance  Ability to drive and/or has transportation:  Dependent  Ability to do shopping:  Independent  Ability to manage finances: Independent  Is patient able to live independently?:  Yes  Hearing and Vision  Visual Impairment:  Reading glasses  Hearing Impairment:  None  Care Transitions Interventions  No Identified Needs       Plan to return home with spouse, no HHC or DME needs at this time. Agreed to [facility] f/u calls after d/c.       Follow Up  Future Appointments   Date Time Provider Tawana Young   5/14/2019 10:30 AM Tiffany Gustafson MD [facility]O [facility]   5/15/2019 10:30 AM Ocie See PACERS FF [facility]   6/7/2019  7:30 AM Suella Ahumada, MD [facility]   6/18/2019  7:45 AM Azeem Boyle [place] PHONE TRANSMISSION FF [facility]       Health Maintenance  Health Maintenance Due   Topic Date Due    Diabetic microalbuminuria test  04/25/2019       Jo Monzon RN

## 2019-04-25 NOTE — CONSULTS
[facility] GENERAL AND LAPAROSCOPIC SURGERY                       PATIENT NAME: Conor Green     ADMISSION DATE: 4/24/2019  5:39 PM      TODAY'S DATE: 4/25/2019    Reason for Consult:  Abd bloating    Requesting Physician:  Dr. Yi Avila:              The patient is a 68 y.o. female who presents with  Back pain. Has CAD and many comorbidities. Has had back pain, with progressive debility. Asked to see as CT showed some enlarged SB loops. Pt states feels some bloating regularly, but no emesis, tolerates food pretty well, no acute change with BM's. 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 No prior resection done.     Past Medical History:        Diagnosis Date    Asthma     Atrial fibrillation (HCC)     Eosinophilia     Hemoptysis     HIGH CHOLESTEROL     Hx of blood clots     Hypertension     Irregular heart beat     Other specified gastritis without mention of hemorrhage     Palpitations     Skin cancer     basal and squamous    Type II or unspecified type diabetes mellitus without mention of complication, not stated as uncontrolled        Past Surgical History:        Procedure Laterality Date    BRONCHOSCOPY  07/18/2016    Dr. Meryle Locks - brushings from [address] [place] [address]-  08/16/2018    Dr. Zafar Tripp  02/07/2017    Dr. Tamara Hull - sigmoid diverticulosis, polypectomies x3    COLONOSCOPY  01/17/2014    Dr. Tamara Hull - sigmoid diverticulosis, polypectomies x3, internal hemorrhoids    COLONOSCOPY  10/10/2018    w/biopsy performed by Megan Salazar MD at [address]  08/21/2018    Dr. Murali Quigley - x3 (LIMA-LAD, L SV-D1-PLV) modified BL MAZE procedure w/obliteration of WAYNE using 45mm AtriClip    HYSTERECTOMY      INSERTABLE CARDIAC MONITOR Left 08/16/2018    Dr. Tyrone Singleton SN# [id number] [employer]    MITRAL VALVE REPLACEMENT  08/21/2018    Dr. Yasmany Dumont - 27mm [employer] Cinch tissue valve    SKIN BIOPSY      TRANSESOPHAGEAL ECHOCARDIOGRAM  08/21/2018    during CABG/MVR    TUNNELED VENOUS CATHETER PLACEMENT Left 08/23/2018    Dr. Tobi Rosas for HD    UPPER GASTROINTESTINAL ENDOSCOPY N/A 10/10/2018    w/biopsy performed by Heriberto Eduardo MD at [address] [place] [address]       Current Medications:   Current Facility-Administered Medications: aspirin chewable tablet 81 mg, 81 mg, Oral, Daily  carvedilol (COREG) tablet 3.125 mg, 3.125 mg, Oral, BID WC  digoxin (LANOXIN) tablet 125 mcg, 125 mcg, Oral, Daily  montelukast (SINGULAIR) tablet 10 mg, 10 mg, Oral, Nightly  pantoprazole (PROTONIX) tablet 40 mg, 40 mg, Oral, QAM AC  potassium chloride (KLOR-CON M) extended release tablet 10 mEq, 10 mEq, Oral, Daily  rosuvastatin (CRESTOR) tablet 20 mg, 20 mg, Oral, Daily  spironolactone (ALDACTONE) tablet 25 mg, 25 mg, Oral, Daily  torsemide (DEMADEX) tablet 20 mg, 20 mg, Oral, Daily  sodium chloride flush 0.9 % injection 10 mL, 10 mL, Intravenous, 2 times per day  sodium chloride flush 0.9 % injection 10 mL, 10 mL, Intravenous, PRN  magnesium hydroxide (MILK OF MAGNESIA) 400 MG/5ML suspension 30 mL, 30 mL, Oral, Daily PRN  ondansetron (ZOFRAN) injection 4 mg, 4 mg, Intravenous, Q6H PRN  0.9 % sodium chloride infusion, , Intravenous, Continuous  potassium chloride (KLOR-CON M) extended release tablet 40 mEq, 40 mEq, Oral, PRN **OR** potassium bicarb-citric acid (EFFER-K) effervescent tablet 40 mEq, 40 mEq, Oral, PRN **OR** potassium chloride 10 mEq/100 mL IVPB (Peripheral Line), 10 mEq, Intravenous, PRN  magnesium sulfate 1 g in dextrose 5% 100 mL IVPB, 1 g, Intravenous, PRN  acetaminophen (TYLENOL) tablet 650 mg, 650 mg, Oral, Q4H PRN  glucose (GLUTOSE) 40 % oral gel 15 g, 15 g, Oral, PRN  dextrose 50 % solution 12.5 g, 12.5 g, Intravenous, PRN  glucagon (rDNA) injection 1 mg, 1 mg, Intramuscular, PRN  dextrose 5 % solution, 100 mL/hr, Intravenous, PRN  HYDROmorphone HCl PF (DILAUDID) injection 1 mg, 1 mg, Intravenous, Q3H PRN  diazepam (VALIUM) tablet 5 mg, 5 mg, Oral, Q8H  dexamethasone (DECADRON) injection 4 mg, 4 mg, Intravenous, Q6H  warfarin (COUMADIN) tablet 5 mg, 5 mg, Oral, Daily  insulin lispro (HUMALOG) injection pen 0-6 Units, 0-6 Units, Subcutaneous, 4x Daily WC  Prior to Admission medications    Medication Sig Start Date End Date Taking? Authorizing Provider   nitrofurantoin, macrocrystal-monohydrate, (MACROBID) 100 MG capsule Take 1 capsule by mouth 2 times daily for 10 days 4/24/19 5/4/19 Yes Joyce Partida MD   terbinafine (LAMISIL) 250 MG tablet TAKE 1 TABLET DAILY 4/22/19  Yes Joyce Partida MD   warfarin (COUMADIN) 5 MG tablet Take 1 tablet by mouth daily 4/8/19  Jhonny Partida MD   montelukast (SINGULAIR) 10 MG tablet TAKE 1 TABLET NIGHTLY 4/8/19  Yes Joyce Partida MD   predniSONE (DELTASONE) 10 MG tablet TAKE 1 TABLET AS NEEDED (EOSINOPHILIC GASTRITIS) 1/5/19  Yes Joyce Partida MD   rosuvastatin (CRESTOR) 20 MG tablet Take 1 tablet by mouth daily 4/3/19  Yes Joyce Partida MD   insulin glargine (LANTUS) 100 UNIT/ML injection pen Inject 55 Units into the skin every morning 3/1/19  Yes Joyce Partida MD   polyethylene glycol (GLYCOLAX) powder FILL DOSING CUP TO MARKED LINE (17 GRAMS) THEN MIX IN LIQUID AND DRINK DAILY 3/1/19  Yes Joyce Partida MD   torsemide (DEMADEX) 20 MG tablet Take 1 tablet by mouth daily 3/1/19  Yes Joyce Partida MD   OxyCODONE HCl 5 MG TABA Take 5 mg by mouth 4 times daily as needed. .   Yes Historical Provider, MD   exenatide (BYETTA 10 MCG PEN) 10 MCG/0.04ML injection Inject 0.04 mLs into the skin 2 times daily (with meals) 1/29/19  Yes Joyce Partida MD   digoxin [facility]) 125 MCG tablet Take 1 tablet by mouth daily 1/29/19  Yes Joyce Partida MD   carvedilol (COREG) 3.125 MG tablet Take 1 tablet by mouth 2 times daily (with meals) 1/29/19  Yes Erich Carias Norma Tena MD   aspirin 81 MG chewable tablet Take 1 tablet by mouth daily 1/22/19  Yes SOLEDAD Velazquez CNP   omeprazole (PRILOSEC) 40 MG delayed release capsule Take 1 capsule by mouth every morning (before breakfast)  Patient taking differently: Take 40 mg by mouth daily as needed  1/11/19  Yes Kody Galeana MD   potassium chloride (KLOR-CON M) 10 MEQ extended release tablet Take 1 tablet by mouth daily 12/14/18  Yes Kody Galeana MD   spironolactone (ALDACTONE) 25 MG tablet Take 1 tablet by mouth daily 12/14/18  Yes Kody Galeana MD   vitamin B-12 500 MCG tablet Take 1 tablet by mouth daily 10/12/18  Yes Oz Corona MD   BD PEN NEEDLE JANNET U/F 32G X 4 MM MISC USE 1 PEN NEEDLE FOUR TIMES A DAY 3/22/19   Kody Galeana MD   ondansetron (ZOFRAN) 4 MG tablet Take 1 tablet by mouth 3 times daily as needed for Nausea or Vomiting 12/28/18   Kody Galeana MD   Blood Glucose Monitoring Suppl (FREESTYLE LITE) GAETANO 1 Device by Does not apply route 4 times daily Patient to check blood sugar 4 times a day and PRN, she is treated with multiple daily injections of insulin that require correction dosing ;A1C 8.1 ; ICD code-E11.65 ,Z79.4  Patient taking differently: 1 Device by Does not apply route 2 times daily  9/4/18   SOLEDAD Rico CNP   blood glucose test strips (FREESTYLE LITE) strip 1 each by Does not apply route 4 times daily Patient to check blood sugar 4 times a day and PRN, she is treated with multiple daily injections of insulin that require correction dosing ;A1C 8.1 ; ICD code-E11.65 ,Z79.4  Patient taking differently: 1 each by Does not apply route 2 times daily  9/4/18   SOLEDAD Rico CNP   FREESTYLE LANCETS MISC 1 each by Does not apply route 4 times daily Patient to check blood sugar 4 times a day and PRN, she is treated with multiple daily injections of insulin that require correction dosing ;A1C 8.1 ; ICD code-E11.65 ,Z79.4  Patient taking differently: 1 each by Does not apply route 2 times daily  9/4/18   Savage Herrmann, APRN - CNP   nystatin (MYCOSTATIN) 713139 UNIT/ML suspension 1 teaspoon 4 times daily x 5 d 4/27/17   Farnaz Zapata MD   albuterol sulfate HFA (PROAIR HFA) 108 (90 BASE) MCG/ACT inhaler Inhale 2 puffs into the lungs every 6 hours as needed for Wheezing 1/3/17   Farnaz Zapata MD        Allergies:  Iwukrzkh-vpsbbio-xncmuw [fluocinolone]; Ciprofloxacin; Diovan [valsartan]; Flagyl [metronidazole]; Metformin and related [metformin and related]; Benazepril; Morphine; Saxagliptin; and Levaquin [levofloxacin]    Social History:    reports that she has never smoked. She has never used smokeless tobacco. She reports that she does not drink alcohol or use drugs.     Family History:        Problem Relation Age of Onset    Cancer Father     Asthma Mother     Hypertension Mother     Heart Disease Mother     High Blood Pressure Mother        REVIEW OF SYSTEMS:  CONSTITUTIONAL:  positive for  fatigue and malaise  HEENT:  negative  RESPIRATORY:  negative  CARDIOVASCULAR:  negative  GASTROINTESTINAL:  negative except for occasional bloating  GENITOURINARY:  negative  HEMATOLOGIC/LYMPHATIC:  negative  NEUROLOGICAL:  negative  SKIN: negative    PHYSICAL EXAM:  VITALS:  BP (!) 99/58   Pulse 96   Temp 97.8 °F (36.6 °C) (Temporal)   Resp 17   Ht 5' 8\" (1.727 m)   Wt 206 lb 9.6 oz (93.7 kg)   SpO2 93%   BMI 31.41 kg/m²   24HR INTAKE/OUTPUT:  No intake or output data in the 24 hours ending 04/25/19 1629  DRAIN/TUBE OUTPUT:     CONSTITUTIONAL:  alert, no apparent abdominal distress and moderately obese  EYES:  sclera clear  ENT:  normocepalic, without obvious abnormality  NECK:  supple, symmetrical, trachea midline  LUNGS:  clear to auscultation  CARDIOVASCULAR:  regular rate and rhythm   ABDOMEN: normal bowel sounds, soft, mildly distended, non-tender, tympanitic, voluntary guarding absent, no masses palpated, no hepatosplenomegally and hernia absent  MUSCULOSKELETAL:  0+ pitting edema lower extremities  NEUROLOGIC:  Mental Status Exam:  Level of Alertness:   awake  Orientation:   person, place, time  SKIN:  no bruising or bleeding, normal skin color, texture, turgor and no redness, warmth, or swelling    DATA:    CBC:   Recent Labs     04/24/19 1806 04/25/19 0455   WBC 7.0 5.0   HGB 12.8 10.6*   HCT 39.3 32.1*    171     BMP:    Recent Labs     04/24/19 1806 04/25/19 0455    142   K 4.4 3.6    108   CO2 24 26   BUN 22* 22*   CREATININE 1.2 0.9   GLUCOSE 159* 113*     Hepatic:   Recent Labs     04/24/19 1806 04/25/19 0455   AST 21 12*   ALT 16 12   BILITOT 0.4 <0.2   ALKPHOS 94 75     Mag:      Recent Labs     04/25/19 0455   MG 1.90      Phos:   No results for input(s): PHOS in the last 72 hours. INR:   Recent Labs     04/24/19 1806 04/25/19 0455   INR 2.96* 2.52*       Radiology Review:     EXAMINATION:   CTA OF THE CHEST; CTA OF THE ABDOMEN AND PELVIS WITH CONTRAST       4/24/2019 7:57 pm:       TECHNIQUE:   CTA of the chest was performed after the administration of intravenous   contrast.  Multiplanar reformatted images are provided for review. MIP   images are provided for review. Dose modulation, iterative reconstruction,   and/or weight based adjustment of the mA/kV was utilized to reduce the   radiation dose to as low as reasonably achievable.; CTA of the abdomen and   pelvis was performed with the administration of intravenous contrast.   Multiplanar reformatted images are provided for review. MIP images are   provided for review. Dose modulation, iterative reconstruction, and/or weight   based adjustment of the mA/kV was utilized to reduce the radiation dose to as   low as reasonably achievable. Curved reformats and 3D volume rendered reformats also obtained of aorta.        COMPARISON:   CT abdomen pelvis 01/17/2019, CT chest 07/06/2016       HISTORY:   ORDERING SYSTEM PROVIDED HISTORY: back pain atraumatic   TECHNOLOGIST PROVIDED HISTORY:   Ordering Physician Provided Reason for Exam: back pain atraumatic   Acuity: Acute   Type of Exam: Initial   Relevant Medical/Surgical History: Back Pain (Pt reports back pain in her   lower back x 6 days. Hx of arthritis. Pt took pain meds at 3 then 5pm without   relief. Has UTI. ); ORDERING SYSTEM PROVIDED HISTORY: atraumatic back pain   TECHNOLOGIST PROVIDED HISTORY:   Ordering Physician Provided Reason for Exam: atraumatic back pain   Acuity: Acute   Type of Exam: Initial   Relevant Medical/Surgical History: Back Pain (Pt reports back pain in her   lower back x 6 days. Hx of arthritis. Pt took pain meds at 3 then 5pm without   relief. Has UTI. )       FINDINGS:       CTA CHEST:       Vascular: Atherosclerotic disease. No evidence for thoracic aorta aneurysm   or dissection. Previous folic trunk and left common carotid artery have a   common origin from the arch. Note is made of enlarged pulmonary trunk which   can be seen in pulmonary hypertension. Mediastinum: Cardiomegaly. Coronary artery calcifications. No significant   mediastinal, hilar or axillary lymphadenopathy. Thyroid gland grossly   normal.  Patulous distal esophagus. Lungs/pleura: Small pleural-parenchymal density posterior right lung base   suggestive of scarring unchanged from 2016. There is calcified 1 cm anterior   right upper lobe nodule. Noncalcified right upper lobe 3 mm nodule noted in   the periphery of the posterior segment inferior aspect. Similar nodule noted   on series 3, image 87 in the anterior aspect superior segment right lower   lobe. 4 mm left lower lobe nodule on image 64. These unchanged from 2016. No pleural effusion or pneumothorax. Bones/soft tissues: Median sternotomy. Diffuse degenerative changes in the   thoracic spine. No acute fracture evident. CTA ABDOMEN/PELVIS:       Vascular:        The abdominal aorta shows no evidence for aneurysm or dissection. There is   moderate atherosclerotic disease. Atherosclerotic disease also noted in   celiac axis and branches as well as in the SMA, iliac arteries. No evidence   for aneurysm or dissection in the celiac axis, SMA, SAMANTHA or iliac arteries. Normal enhancement of renal arteries. Organs: The liver, spleen, pancreas, kidneys, adrenal glands show no significant   abnormalities. Cholecystectomy noted. GI tract: There is limited evaluation due to absence of oral contrast.  Stomach shows   no focal lesions. A few mildly dilated left mid abdomen small bowel loops   are noted without evidence for a transition point. Developing ileus or   obstruction not excluded. Follow-up would be advised. No evidence for acute   appendicitis. No acute process in the colon. Pelvis:       Urinary bladder grossly normal.  Uterus present. Left adnexal cystic mass   8.2 x 6.0 cm (AP by transverse) unchanged from January 2019. Peritoneum/Retroperitoneum:       No free intraperitoneal fluid. No significant lymphadenopathy. A 9 mm   mesenteric lymph node in the left mid abdomen on series 5, image 161 is   unchanged. Bones/Soft tissues:       Diffuse degenerative changes in the spine. Impression   1. Moderate diffuse atherosclerotic disease. No evidence for aortic aneurysm   or dissection. 2. Enlarged pulmonary trunk noted which can be seen in pulmonary hypertension. 3. Stable sub 5 mm lung nodules described in detail above. No follow-up   imaging recommended. These dates back to July 2016.   4. Cardiomegaly. 5. A few mildly dilated left mid abdomen small bowel loops without transition   point. Recommend close observation and follow-up to exclude developing ileus   or obstruction. 6. Unchanged 8.2 x 6.0 cm left adnexal cystic mass. Recommend follow-up   pelvic MRI with IV contrast or surgical evaluation.          EXAMINATION:   CT OF THE ABDOMEN AND PELVIS WITHOUT CONTRAST 1/17/2019 10:40 am       TECHNIQUE:   CT of the abdomen and pelvis was performed without the administration of   intravenous contrast. Multiplanar reformatted images are provided for review. Dose modulation, iterative reconstruction, and/or weight based adjustment of   the mA/kV was utilized to reduce the radiation dose to as low as reasonably   achievable. COMPARISON:   01/14/2019, 10/01/2018       HISTORY:   ORDERING SYSTEM PROVIDED HISTORY: ABDOMINAL PAIN   TECHNOLOGIST PROVIDED HISTORY:   Additional Contrast?->Oral   Ordering Physician Provided Reason for Exam: ABDOMINAL PAINback pain,   pneumoperitoneum, pneumatosis followup   Acuity: Unknown   Type of Exam: Unknown       FINDINGS:   Lower Chest: Partially visualized heart is enlarged. Lower lungs appear   clear. Organs: Unenhanced liver, spleen, pancreas, adrenal glands, and kidneys   appear unremarkable. Gallbladder surgically absent. GI/Bowel: No bowel obstruction. Compared to the previous study, there is   interval decrease in amount of small bowel pneumatosis. No significant bowel   wall thickening of the small or large bowel. Pelvis: Urinary bladder and uterus are unremarkable. Redemonstration of a   8.1 x 5.8 cm AP/TR hypodense lesion involving the left ovary. Peritoneum/Retroperitoneum: Interval decrease in amount of intraperitoneal   air with scattered amounts mesenteric gas stool present. No free fluid. No   lymphadenopathy by size criteria. Vascular: Diffuse atherosclerotic calcification of the abdominal aorta and   branching vessels. Bones/Soft Tissues: Degenerative changes of the lumbar spine. Impression   Interval decrease in extent of small bowel pneumatosis and intraperitoneal   air. Etiology of this is again unclear in may be benign. Redemonstration of an 8.1 cm x 5.8 cm cystic lesion appearing to arise from   the left ovary.   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 MRI of the pelvis with contrast or surgical consultation is   recommended.            IMPRESSION/RECOMMENDATIONS:    Abdominal ileus, mild bloating  Likely related to back pain issues    Will check a follow up AXR in am, consider SBFT to assess if any question regarding ileus vs obstruction    Thank you,    Silvia Garcias

## 2019-04-25 NOTE — PROGRESS NOTES
[facility]    4/25/2019 9:36 AM        Name: Romero Newell . Admitted: 4/24/2019  Primary Care Provider: Mohini Laureano MD (Tel: [phone])    Brief Course:        CC: back pain    Subjective:  .      at bedside  Patient worked with therapy  5/10 upper back pain starting from center going towards both sides  Did had bm, no abdominal pain    H/o similar pains in the past    Reviewed interval ancillary notes    Current Medications    aspirin chewable tablet 81 mg Daily   carvedilol (COREG) tablet 3.125 mg BID WC   digoxin (LANOXIN) tablet 125 mcg Daily   montelukast (SINGULAIR) tablet 10 mg Nightly   pantoprazole (PROTONIX) tablet 40 mg QAM AC   potassium chloride (KLOR-CON M) extended release tablet 10 mEq Daily   rosuvastatin (CRESTOR) tablet 20 mg Daily   spironolactone (ALDACTONE) tablet 25 mg Daily   torsemide (DEMADEX) tablet 20 mg Daily   sodium chloride flush 0.9 % injection 10 mL 2 times per day   sodium chloride flush 0.9 % injection 10 mL PRN   magnesium hydroxide (MILK OF MAGNESIA) 400 MG/5ML suspension 30 mL Daily PRN   ondansetron (ZOFRAN) injection 4 mg Q6H PRN   0.9 % sodium chloride infusion Continuous   potassium chloride (KLOR-CON M) extended release tablet 40 mEq PRN   Or    potassium bicarb-citric acid (EFFER-K) effervescent tablet 40 mEq PRN   Or    potassium chloride 10 mEq/100 mL IVPB (Peripheral Line) PRN   magnesium sulfate 1 g in dextrose 5% 100 mL IVPB PRN   acetaminophen (TYLENOL) tablet 650 mg Q4H PRN   glucose (GLUTOSE) 40 % oral gel 15 g PRN   dextrose 50 % solution 12.5 g PRN   glucagon (rDNA) injection 1 mg PRN   dextrose 5 % solution PRN   insulin lispro (HUMALOG) injection pen 0-6 Units Q4H   HYDROmorphone HCl PF (DILAUDID) injection 1 mg Q3H PRN   diazepam (VALIUM) tablet 5 mg Q8H   dexamethasone (DECADRON) injection 4 mg Q6H       Objective:  /77 Pulse 75   Temp 96.7 °F (35.9 °C) (Temporal)   Resp 16   Ht 5' 8\" (1.727 m)   Wt 206 lb 9.6 oz (93.7 kg)   SpO2 95%   BMI 31.41 kg/m²   No intake or output data in the 24 hours ending 04/25/19 0936 Wt Readings from Last 3 Encounters:   04/25/19 206 lb 9.6 oz (93.7 kg)   04/24/19 200 lb (90.7 kg)   04/12/19 204 lb (92.5 kg)     Lower thoracic region tenderness noticed   General appearance:  Appears comfortable  Eyes: Sclera clear. Pupils equal.  ENT: Moist oral mucosa. Trachea midline, no adenopathy. Cardiovascular: Regular rhythm, normal S1, S2. No murmur. No edema in lower extremities  Respiratory: Not using accessory muscles. Good inspiratory effort. Clear to auscultation bilaterally, no wheeze or crackles. GI: Abdomen soft, no tenderness, not distended, normal bowel sounds  Musculoskeletal: No cyanosis in digits, neck supple  Neurology: CN 2-12 grossly intact. No speech or motor deficits  Psych: Normal affect. Alert and oriented in time, place and person  Skin: Warm, dry, normal turgor  Extremity exam shows brisk capillary refill. Peripheral pulses are palpable in lower extremities     Labs and Tests:  CBC:   Recent Labs     04/24/19  1806 04/25/19  0455   WBC 7.0 5.0   HGB 12.8 10.6*    171     BMP:  Recent Labs     04/24/19  1806 04/25/19  0455    142   K 4.4 3.6    108   CO2 24 26   BUN 22* 22*   CREATININE 1.2 0.9   GLUCOSE 159* 113*     Hepatic: Recent Labs     04/24/19  1806 04/25/19  0455   AST 21 12*   ALT 16 12   BILITOT 0.4 <0.2   ALKPHOS 94 75     CTA ABDOMEN PELVIS W CONTRAST   Final Result   1. Moderate diffuse atherosclerotic disease. No evidence for aortic aneurysm   or dissection. 2. Enlarged pulmonary trunk noted which can be seen in pulmonary hypertension. 3. Stable sub 5 mm lung nodules described in detail above. No follow-up   imaging recommended. These dates back to July 2016.   4. Cardiomegaly.    5. A few mildly dilated left mid abdomen small bowel loops without transition   point. Recommend close observation and follow-up to exclude developing ileus   or obstruction. 6. Unchanged 8.2 x 6.0 cm left adnexal cystic mass. Recommend follow-up   pelvic MRI with IV contrast or surgical evaluation. CTA CHEST W CONTRAST   Final Result   1. Moderate diffuse atherosclerotic disease. No evidence for aortic aneurysm   or dissection. 2. Enlarged pulmonary trunk noted which can be seen in pulmonary hypertension. 3. Stable sub 5 mm lung nodules described in detail above. No follow-up   imaging recommended. These dates back to July 2016.   4. Cardiomegaly. 5. A few mildly dilated left mid abdomen small bowel loops without transition   point. Recommend close observation and follow-up to exclude developing ileus   or obstruction. 6. Unchanged 8.2 x 6.0 cm left adnexal cystic mass. Recommend follow-up   pelvic MRI with IV contrast or surgical evaluation. XR CHEST STANDARD (2 VW)   Final Result   No acute cardiopulmonary disease. CT THORACIC SPINE W CONTRAST    (Results Pending)       Discussed care with family          Assessment & Plan:   Acute thoracic region radiculopathy   H/o same in the past  Already had ct chest/abd/pelvis with contrast, d/w radiology to see if can get dedicated ct thoracic spine images  Steroids and valium for now along with prn iv opioids     Ovarian lesion will need dedicated MRI with contrast   Unchanged from previous scan     ? ?  Ileus  No gi symptoms  Advance diet     afib  Coumadin to resume    H/o MVR and CABG in 2018     DM  ISS        IV Access: peripheral   Henry: no  Diet: DIET FULL LIQUID;  Code:Full Code  DVT PPX Coumadin   Disposition hopefully next 24-48 hours      Gianni Tam MD   4/25/2019 9:36 AM

## 2019-04-26 LAB
ANION GAP SERPL CALCULATED.3IONS-SCNC: 9 MMOL/L (ref 3–16)
BUN BLDV-MCNC: 24 MG/DL (ref 7–20)
CALCIUM SERPL-MCNC: 8.5 MG/DL (ref 8.3–10.6)
CHLORIDE BLD-SCNC: 104 MMOL/L (ref 99–110)
CO2: 25 MMOL/L (ref 21–32)
CREAT SERPL-MCNC: 0.9 MG/DL (ref 0.6–1.2)
GFR AFRICAN AMERICAN: >60
GFR NON-AFRICAN AMERICAN: >60
GLUCOSE BLD-MCNC: 228 MG/DL (ref 70–99)
GLUCOSE BLD-MCNC: 284 MG/DL (ref 70–99)
GLUCOSE BLD-MCNC: 322 MG/DL (ref 70–99)
GLUCOSE BLD-MCNC: 364 MG/DL (ref 70–99)
GLUCOSE BLD-MCNC: 372 MG/DL (ref 70–99)
GLUCOSE BLD-MCNC: 403 MG/DL (ref 70–99)
HCT VFR BLD CALC: 32.6 % (ref 36–48)
HEMOGLOBIN: 10.9 G/DL (ref 12–16)
INR BLD: 1.74 (ref 0.86–1.14)
MCH RBC QN AUTO: 29 PG (ref 26–34)
MCHC RBC AUTO-ENTMCNC: 33.5 G/DL (ref 31–36)
MCV RBC AUTO: 86.8 FL (ref 80–100)
PDW BLD-RTO: 15.9 % (ref 12.4–15.4)
PERFORMED ON: ABNORMAL
PLATELET # BLD: 153 K/UL (ref 135–450)
PMV BLD AUTO: 8.9 FL (ref 5–10.5)
POTASSIUM REFLEX MAGNESIUM: 4 MMOL/L (ref 3.5–5.1)
PROTHROMBIN TIME: 19.8 SEC (ref 9.8–13)
RBC # BLD: 3.75 M/UL (ref 4–5.2)
SODIUM BLD-SCNC: 138 MMOL/L (ref 136–145)
URINE CULTURE, ROUTINE: NORMAL
WBC # BLD: 5.3 K/UL (ref 4–11)

## 2019-04-26 PROCEDURE — 80048 BASIC METABOLIC PNL TOTAL CA: CPT

## 2019-04-26 PROCEDURE — 2580000003 HC RX 258: Performed by: HOSPITALIST

## 2019-04-26 PROCEDURE — 99231 SBSQ HOSP IP/OBS SF/LOW 25: CPT | Performed by: SURGERY

## 2019-04-26 PROCEDURE — 6360000002 HC RX W HCPCS: Performed by: INTERNAL MEDICINE

## 2019-04-26 PROCEDURE — 2060000000 HC ICU INTERMEDIATE R&B

## 2019-04-26 PROCEDURE — 85027 COMPLETE CBC AUTOMATED: CPT

## 2019-04-26 PROCEDURE — 6370000000 HC RX 637 (ALT 250 FOR IP): Performed by: INTERNAL MEDICINE

## 2019-04-26 PROCEDURE — 6360000002 HC RX W HCPCS: Performed by: HOSPITALIST

## 2019-04-26 PROCEDURE — 6370000000 HC RX 637 (ALT 250 FOR IP): Performed by: HOSPITALIST

## 2019-04-26 PROCEDURE — 2500000003 HC RX 250 WO HCPCS: Performed by: INTERNAL MEDICINE

## 2019-04-26 PROCEDURE — 36415 COLL VENOUS BLD VENIPUNCTURE: CPT

## 2019-04-26 PROCEDURE — 85610 PROTHROMBIN TIME: CPT

## 2019-04-26 RX ORDER — SODIUM CHLORIDE 0.9 % (FLUSH) 0.9 %
SYRINGE (ML) INJECTION
Status: DISPENSED
Start: 2019-04-26 | End: 2019-04-27

## 2019-04-26 RX ORDER — OXYCODONE HYDROCHLORIDE 5 MG/1
5 TABLET ORAL EVERY 4 HOURS PRN
Status: DISCONTINUED | OUTPATIENT
Start: 2019-04-26 | End: 2019-04-27 | Stop reason: HOSPADM

## 2019-04-26 RX ADMIN — INSULIN GLARGINE 55 UNITS: 100 INJECTION, SOLUTION SUBCUTANEOUS at 09:11

## 2019-04-26 RX ADMIN — ASPIRIN 81 MG 81 MG: 81 TABLET ORAL at 08:57

## 2019-04-26 RX ADMIN — HYDROMORPHONE HYDROCHLORIDE 1 MG: 1 INJECTION, SOLUTION INTRAMUSCULAR; INTRAVENOUS; SUBCUTANEOUS at 17:52

## 2019-04-26 RX ADMIN — CARVEDILOL 3.12 MG: 3.12 TABLET, FILM COATED ORAL at 08:56

## 2019-04-26 RX ADMIN — INSULIN LISPRO 3 UNITS: 100 INJECTION, SOLUTION INTRAVENOUS; SUBCUTANEOUS at 23:04

## 2019-04-26 RX ADMIN — DIAZEPAM 5 MG: 5 TABLET ORAL at 09:01

## 2019-04-26 RX ADMIN — CARVEDILOL 3.12 MG: 3.12 TABLET, FILM COATED ORAL at 17:05

## 2019-04-26 RX ADMIN — INSULIN LISPRO 15 UNITS: 100 INJECTION, SOLUTION INTRAVENOUS; SUBCUTANEOUS at 09:12

## 2019-04-26 RX ADMIN — ROSUVASTATIN CALCIUM 20 MG: 20 TABLET, FILM COATED ORAL at 23:17

## 2019-04-26 RX ADMIN — INSULIN LISPRO 9 UNITS: 100 INJECTION, SOLUTION INTRAVENOUS; SUBCUTANEOUS at 17:49

## 2019-04-26 RX ADMIN — TORSEMIDE 20 MG: 20 TABLET ORAL at 08:57

## 2019-04-26 RX ADMIN — ONDANSETRON 4 MG: 2 INJECTION INTRAMUSCULAR; INTRAVENOUS at 14:57

## 2019-04-26 RX ADMIN — Medication 10 ML: at 08:56

## 2019-04-26 RX ADMIN — WARFARIN SODIUM 5 MG: 5 TABLET ORAL at 17:05

## 2019-04-26 RX ADMIN — HYDROMORPHONE HYDROCHLORIDE 1 MG: 1 INJECTION, SOLUTION INTRAMUSCULAR; INTRAVENOUS; SUBCUTANEOUS at 22:55

## 2019-04-26 RX ADMIN — Medication 10 ML: at 22:56

## 2019-04-26 RX ADMIN — HYDROMORPHONE HYDROCHLORIDE 1 MG: 1 INJECTION, SOLUTION INTRAMUSCULAR; INTRAVENOUS; SUBCUTANEOUS at 14:57

## 2019-04-26 RX ADMIN — INSULIN LISPRO 18 UNITS: 100 INJECTION, SOLUTION INTRAVENOUS; SUBCUTANEOUS at 12:26

## 2019-04-26 RX ADMIN — PANTOPRAZOLE SODIUM 40 MG: 40 TABLET, DELAYED RELEASE ORAL at 06:50

## 2019-04-26 RX ADMIN — DEXAMETHASONE SODIUM PHOSPHATE 4 MG: 4 INJECTION, SOLUTION INTRAMUSCULAR; INTRAVENOUS at 05:43

## 2019-04-26 RX ADMIN — DEXAMETHASONE SODIUM PHOSPHATE 4 MG: 4 INJECTION, SOLUTION INTRAMUSCULAR; INTRAVENOUS at 17:00

## 2019-04-26 RX ADMIN — POTASSIUM CHLORIDE 10 MEQ: 1500 TABLET, EXTENDED RELEASE ORAL at 08:57

## 2019-04-26 RX ADMIN — DEXAMETHASONE SODIUM PHOSPHATE 4 MG: 4 INJECTION, SOLUTION INTRAMUSCULAR; INTRAVENOUS at 22:55

## 2019-04-26 RX ADMIN — DEXAMETHASONE SODIUM PHOSPHATE 4 MG: 4 INJECTION, SOLUTION INTRAMUSCULAR; INTRAVENOUS at 08:56

## 2019-04-26 RX ADMIN — DIGOXIN 125 MCG: 125 TABLET ORAL at 09:01

## 2019-04-26 RX ADMIN — Medication 10 ML: at 14:59

## 2019-04-26 RX ADMIN — DIAZEPAM 5 MG: 5 TABLET ORAL at 17:05

## 2019-04-26 RX ADMIN — MONTELUKAST SODIUM 10 MG: 10 TABLET, FILM COATED ORAL at 22:55

## 2019-04-26 RX ADMIN — SPIRONOLACTONE 25 MG: 25 TABLET ORAL at 08:56

## 2019-04-26 ASSESSMENT — PAIN SCALES - GENERAL
PAINLEVEL_OUTOF10: 5
PAINLEVEL_OUTOF10: 7
PAINLEVEL_OUTOF10: 4
PAINLEVEL_OUTOF10: 4
PAINLEVEL_OUTOF10: 0
PAINLEVEL_OUTOF10: 6
PAINLEVEL_OUTOF10: 6
PAINLEVEL_OUTOF10: 3

## 2019-04-26 ASSESSMENT — PAIN DESCRIPTION - PAIN TYPE
TYPE: ACUTE PAIN
TYPE: ACUTE PAIN
TYPE: CHRONIC PAIN

## 2019-04-26 ASSESSMENT — PAIN DESCRIPTION - FREQUENCY: FREQUENCY: CONTINUOUS

## 2019-04-26 ASSESSMENT — PAIN DESCRIPTION - ONSET: ONSET: ON-GOING

## 2019-04-26 ASSESSMENT — PAIN DESCRIPTION - ORIENTATION
ORIENTATION: LOWER;MID
ORIENTATION: MID

## 2019-04-26 ASSESSMENT — PAIN DESCRIPTION - LOCATION
LOCATION: BACK

## 2019-04-26 ASSESSMENT — PAIN DESCRIPTION - DESCRIPTORS: DESCRIPTORS: BURNING

## 2019-04-26 NOTE — CONSULTS
at [address] GRAFT  08/21/2018    Dr. Arnulfo Romo - x3 (LIMA-LAD, L SV-[id number]) modified BL MAZE procedure w/obliteration of WAYNE using 45mm AtriClip    HYSTERECTOMY      INSERTABLE CARDIAC MONITOR Left 08/16/2018    Dr. Adelina Simons [facility] SN# [id number] [employer]    MITRAL VALVE REPLACEMENT  08/21/2018    Dr. Arnlufo Romo - [facility] tissue valve    SKIN BIOPSY      TRANSESOPHAGEAL ECHOCARDIOGRAM  08/21/2018    during CABG/MVR    TUNNELED VENOUS CATHETER PLACEMENT Left 08/23/2018    Dr. Diane Ibarra for HD    UPPER GASTROINTESTINAL ENDOSCOPY N/A 10/10/2018    w/biopsy performed by Linh Fischer MD at [facility]; Ciprofloxacin; Diovan [valsartan]; Flagyl [metronidazole]; Metformin and related [metformin and related];  Benazepril; Morphine; Saxagliptin; and Levaquin [levofloxacin]    Current Facility-Administered Medications:     exenatide (BYETTA) injection 10 mcg (Patient Supplied), 10 mcg, Subcutaneous, BID , Sohan [facility] MD, 10 mcg at 04/26/19 1228    insulin glargine (LANTUS) injection pen 55 Units, 55 Units, Subcutaneous, QAM, Sohan [facility] MD, 55 Units at 04/26/19 0911    insulin lispro (HUMALOG) injection pen 0-18 Units, 0-18 Units, Subcutaneous, TID , Sohan [facility] MD, 18 Units at 04/26/19 1226    insulin lispro (HUMALOG) injection pen 0-9 Units, 0-9 Units, Subcutaneous, Nightly, Sohan Buchanan MD    aspirin chewable tablet 81 mg, 81 mg, Oral, Daily, Concha Barbosa MD, 81 mg at 04/26/19 0857    carvedilol (COREG) tablet 3.125 mg, 3.125 mg, Oral, BID , Concha Barbosa MD, 3.125 mg at 04/26/19 0856    digoxin (LANOXIN) tablet 125 mcg, 125 mcg, Oral, Daily, Concha Barbosa MD, 125 mcg at 04/26/19 0901    montelukast (SINGULAIR) tablet 10 mg, 10 mg, Oral, Nightly, Concha Barbosa MD, 10 mg at 04/25/19 2038    pantoprazole (PROTONIX) tablet 40 mg, 40 mg, Oral, QAM Stepan Johnson MD, 40 mg at 04/26/19 0650    potassium chloride (KLOR-CON M) extended release tablet 10 mEq, 10 mEq, Oral, Daily, Concha Barbosa MD, 10 mEq at 04/26/19 0857    rosuvastatin (CRESTOR) tablet 20 mg, 20 mg, Oral, Daily, Concha Barbosa MD, 20 mg at 04/25/19 2038    spironolactone (ALDACTONE) tablet 25 mg, 25 mg, Oral, Daily, Concha Barbosa MD, 25 mg at 04/26/19 0856    torsemide (DEMADEX) tablet 20 mg, 20 mg, Oral, Daily, Concha Barbosa MD, 20 mg at 04/26/19 0857    sodium chloride flush 0.9 % injection 10 mL, 10 mL, Intravenous, 2 times per day, Norma Jaramillo MD, 10 mL at 04/26/19 0856    sodium chloride flush 0.9 % injection 10 mL, 10 mL, Intravenous, PRN, Norma Jaramillo MD, 10 mL at 04/26/19 1459    magnesium hydroxide (MILK OF MAGNESIA) 400 MG/5ML suspension 30 mL, 30 mL, Oral, Daily PRN, Norma Jaramillo MD    ondansetron (ZOFRAN) injection 4 mg, 4 mg, Intravenous, Q6H PRN, Norma Jaramillo MD, 4 mg at 04/26/19 1457    potassium chloride (KLOR-CON M) extended release tablet 40 mEq, 40 mEq, Oral, PRN **OR** potassium bicarb-citric acid (EFFER-K) effervescent tablet 40 mEq, 40 mEq, Oral, PRN **OR** potassium chloride 10 mEq/100 mL IVPB (Peripheral Line), 10 mEq, Intravenous, PRN, Norma Jaramillo MD    magnesium sulfate 1 g in dextrose 5% 100 mL IVPB, 1 g, Intravenous, PRN, Norma Jaramillo MD    acetaminophen (TYLENOL) tablet 650 mg, 650 mg, Oral, Q4H PRN, Norma Jaramillo MD, 650 mg at 04/25/19 0610    glucose (GLUTOSE) 40 % oral gel 15 g, 15 g, Oral, PRN, Concha Barbosa MD    dextrose 50 % solution 12.5 g, 12.5 g, Intravenous, PRN, Norma Jaramillo MD    glucagon (rDNA) injection 1 mg, 1 mg, Intramuscular, PRN, Concha Barbosa MD    dextrose 5 % solution, 100 mL/hr, Intravenous, PRN, Norma Jaramillo MD    HYDROmorphone HCl PF (DILAUDID) injection 1 mg, 1 mg, Intravenous, Q3H PRN, Kaylene Ribera MD, 1 mg at 04/26/19 5085   diazepam (VALIUM) tablet 5 mg, 5 mg, Oral, Q8H, Sohan Ellington MD, 5 mg at 04/26/19 0901    dexamethasone (DECADRON) injection 4 mg, 4 mg, Intravenous, Q6H, Sohan Ellington MD, 4 mg at 04/26/19 0856    warfarin (COUMADIN) tablet 5 mg, 5 mg, Oral, Daily, Sohan Ellington MD, 5 mg at 04/25/19 1700  Social History     Socioeconomic History    Marital status:      Spouse name: Not on file    Number of children: Not on file    Years of education: Not on file    Highest education level: Not on file   Occupational History    Not on file   Social Needs    Financial resource strain: Not on file    Food insecurity:     Worry: Not on file     Inability: Not on file    Transportation needs:     Medical: Not on file     Non-medical: Not on file   Tobacco Use    Smoking status: Never Smoker    Smokeless tobacco: Never Used   Substance and Sexual Activity    Alcohol use: No    Drug use: No    Sexual activity: Not on file   Lifestyle    Physical activity:     Days per week: Not on file     Minutes per session: Not on file    Stress: Not on file   Relationships    Social connections:     Talks on phone: Not on file     Gets together: Not on file     Attends Faith service: Not on file     Active member of club or organization: Not on file     Attends meetings of clubs or organizations: Not on file     Relationship status: Not on file    Intimate partner violence:     Fear of current or ex partner: Not on file     Emotionally abused: Not on file     Physically abused: Not on file     Forced sexual activity: Not on file   Other Topics Concern    Not on file   Social History Narrative    Not on file     Family History   Problem Relation Age of Onset    Cancer Father     Asthma Mother     Hypertension Mother     Heart Disease Mother     High Blood Pressure Mother        ROS: Complete 10 point ROS was obtained with positives in HPI, otherwise:  Pt denies fevers, denies chills.   Pt disc disease at T11-T12 with left paracentral   disc bulge contributing to moderate spinal canal stenosis.       SOFT TISSUES: No paraspinal mass is seen.           Impression   1. Moderate and severe multilevel degenerative disc disease throughout the   mid and lower thoracic spine. 2. At T11-T12, there is left paracentral disc protrusion which contributes to   moderate spinal canal stenosis. 3. Diffuse osteopenia.         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 Noncontrast CT shows disc protrusion at T11-12 resulting in some left sided foraminal impingement. This correlates with her exam and symptoms. No myelopathy on exam.  No urgent surgical intervention indicated. Agree with completion of conservative treatment with steroids (can switch to oral), antispasmodic and opiate analgesics as needed for pain control. Home once pain controlled and medically stable. If pain remains she will need outpt thoracic MRI. Pt very claustrophobic and will require sedation. Pt would then be candidate for outpt ESIs (pt would likely require bridging with lovenox when warfarin held).   She can follow-up to our office as

## 2019-04-26 NOTE — PLAN OF CARE
Problem: Pain:  Goal: Patient's pain/discomfort is manageable  Description  Patient's pain/discomfort is manageable  Outcome: Ongoing  Note:   States back pain is currently 4/10, and tolerable     Problem: Safety:  Goal: Free from accidental physical injury  Description  Free from accidental physical injury  Outcome: Ongoing  Note:   Call light in reach; bed in low position; SR up x2; pt affirms awareness and understanding of need to call for and await assist with OOB mobility if lightheaded or fatigued. Problem: Discharge Planning:  Goal: Patients continuum of care needs are met  Description  Patients continuum of care needs are met  Outcome: Ongoing  Note:   Anticipate discharge to home with spouse; no HHC or DME needs evident at present, but diagnostic work-up likely incomplete at present     Problem: Cardiovascular  Goal: No DVT, peripheral vascular complications  Outcome: Ongoing  Note:   No calf tenderness evident at this time  Goal: Hemodynamic stability  Outcome: Ongoing  Note:   BP, HR, temp, RR, RA O2 sat WNL  Goal: Anticoagulate/Hct stable  Outcome: Ongoing     Problem: Respiratory  Goal: No pulmonary complications  Outcome: Ongoing  Note:   Breath sounds clear bilat     Problem: GI  Goal: No bowel complications  Outcome: Ongoing  Note:   Suspect ileus - awaiting Gen Surg for poss AXR/SBFT orders  Goal: Bowel movement at least every other day  Outcome: Ongoing  Note:   Pt affirms she passed small BM this AM, and states that is typical for her; pt passing flatus     Problem: KNOWLEDGE DEFICIT  Goal: Patient/S.O. demonstrates understanding of disease process, treatment plan, medications, and discharge instructions.   Outcome: Ongoing  Note:   instructed on recent orders     Problem: HEMODYNAMIC STATUS  Goal: Patient has stable vital signs and fluid balance  Outcome: Ongoing  Note:   Remains in SR     Problem: FLUID AND ELECTROLYTE IMBALANCE  Goal: Fluid and electrolyte balance are achieved/maintained  Outcome: Ongoing  Note:   1+ mildly pitting BLE edema present

## 2019-04-26 NOTE — PROGRESS NOTES
[facility]        PATIENT NAME: Kiana Rico     TODAY'S DATE: 4/26/2019    SUBJECTIVE: CC: abd pain   Pt denies pain, feels better, more comfortable overall, no N/V. No bloating. Passed gas. Back sx's also improving.      Current Medications:   Current Facility-Administered Medications: exenatide (BYETTA) injection 10 mcg (Patient Supplied), 10 mcg, Subcutaneous, BID WC  insulin glargine (LANTUS) injection pen 55 Units, 55 Units, Subcutaneous, QAM  insulin lispro (HUMALOG) injection pen 0-18 Units, 0-18 Units, Subcutaneous, TID WC  insulin lispro (HUMALOG) injection pen 0-9 Units, 0-9 Units, Subcutaneous, Nightly  aspirin chewable tablet 81 mg, 81 mg, Oral, Daily  carvedilol (COREG) tablet 3.125 mg, 3.125 mg, Oral, BID WC  digoxin (LANOXIN) tablet 125 mcg, 125 mcg, Oral, Daily  montelukast (SINGULAIR) tablet 10 mg, 10 mg, Oral, Nightly  pantoprazole (PROTONIX) tablet 40 mg, 40 mg, Oral, QAM AC  potassium chloride (KLOR-CON M) extended release tablet 10 mEq, 10 mEq, Oral, Daily  rosuvastatin (CRESTOR) tablet 20 mg, 20 mg, Oral, Daily  spironolactone (ALDACTONE) tablet 25 mg, 25 mg, Oral, Daily  torsemide (DEMADEX) tablet 20 mg, 20 mg, Oral, Daily  sodium chloride flush 0.9 % injection 10 mL, 10 mL, Intravenous, 2 times per day  sodium chloride flush 0.9 % injection 10 mL, 10 mL, Intravenous, PRN  magnesium hydroxide (MILK OF MAGNESIA) 400 MG/5ML suspension 30 mL, 30 mL, Oral, Daily PRN  ondansetron (ZOFRAN) injection 4 mg, 4 mg, Intravenous, Q6H PRN  potassium chloride (KLOR-CON M) extended release tablet 40 mEq, 40 mEq, Oral, PRN **OR** potassium bicarb-citric acid (EFFER-K) effervescent tablet 40 mEq, 40 mEq, Oral, PRN **OR** potassium chloride 10 mEq/100 mL IVPB (Peripheral Line), 10 mEq, Intravenous, PRN  magnesium sulfate 1 g in dextrose 5% 100 mL IVPB, 1 g, Intravenous, PRN  acetaminophen (TYLENOL) tablet 650 mg, 650 mg, Oral, Q4H PRN  glucose (GLUTOSE) 40 % oral gel 15 g, 15 g, Oral, PRN  dextrose 50 % solution 12.5 g, 12.5 g, Intravenous, PRN  glucagon (rDNA) injection 1 mg, 1 mg, Intramuscular, PRN  dextrose 5 % solution, 100 mL/hr, Intravenous, PRN  HYDROmorphone HCl PF (DILAUDID) injection 1 mg, 1 mg, Intravenous, Q3H PRN  diazepam (VALIUM) tablet 5 mg, 5 mg, Oral, Q8H  dexamethasone (DECADRON) injection 4 mg, 4 mg, Intravenous, Q6H  warfarin (COUMADIN) tablet 5 mg, 5 mg, Oral, Daily  Prior to Admission medications    Medication Sig Start Date End Date Taking? Authorizing Provider   nitrofurantoin, macrocrystal-monohydrate, (MACROBID) 100 MG capsule Take 1 capsule by mouth 2 times daily for 10 days 4/24/19 5/4/19 Yes Mohini Laureano MD   terbinafine (LAMISIL) 250 MG tablet TAKE 1 TABLET DAILY 4/22/19  Yes Mohini Laureano MD   warfarin (COUMADIN) 5 MG tablet Take 1 tablet by mouth daily 4/8/19  Yes Mohini Laureano MD   montelukast (SINGULAIR) 10 MG tablet TAKE 1 TABLET NIGHTLY 4/8/19  Yes Mohini Laureano MD   predniSONE (DELTASONE) 10 MG tablet TAKE 1 TABLET AS NEEDED (EOSINOPHILIC GASTRITIS) 5/3/97  Yes Mohini Laureano MD   rosuvastatin (CRESTOR) 20 MG tablet Take 1 tablet by mouth daily 4/3/19  Yes Mohini Laureano MD   insulin glargine (LANTUS) 100 UNIT/ML injection pen Inject 55 Units into the skin every morning 3/1/19  Yes Mohini Laureano MD   polyethylene glycol (GLYCOLAX) powder FILL DOSING CUP TO MARKED LINE (17 GRAMS) THEN MIX IN LIQUID AND DRINK DAILY 3/1/19  Yes Mohini Laureano MD   torsemide (DEMADEX) 20 MG tablet Take 1 tablet by mouth daily 3/1/19  Yes Mohini Laureano MD   OxyCODONE HCl 5 MG TABA Take 5 mg by mouth 4 times daily as needed. .   Yes Historical Provider, MD   exenatide (BYETTA 10 MCG PEN) 10 MCG/0.04ML injection Inject 0.04 mLs into the skin 2 times daily (with meals) 1/29/19  Yes Mohini Laureano MD   digoxin [facility]) 125 MCG tablet Take 1 tablet by mouth daily 1/29/19  Yes Mohini Laureano MD   carvedilol (COREG) 3.125 MG tablet Take 1 tablet by mouth 2 times daily (with meals) 1/29/19  Yes Miguel Lawrence MD   aspirin 81 MG chewable tablet Take 1 tablet by mouth daily 1/22/19  Yes SOLEDAD Velazquez CNP   omeprazole (PRILOSEC) 40 MG delayed release capsule Take 1 capsule by mouth every morning (before breakfast)  Patient taking differently: Take 40 mg by mouth daily as needed  1/11/19  Yes Miguel Lawrence MD   potassium chloride (KLOR-CON M) 10 MEQ extended release tablet Take 1 tablet by mouth daily 12/14/18  Yes Miguel Lawrence MD   spironolactone (ALDACTONE) 25 MG tablet Take 1 tablet by mouth daily 12/14/18  Yes Miguel Lawrence MD   vitamin B-12 500 MCG tablet Take 1 tablet by mouth daily 10/12/18  Yes Sherlyn Lima MD   BD PEN NEEDLE JANNET U/F 32G X 4 MM MISC USE 1 PEN NEEDLE FOUR TIMES A DAY 3/22/19   Miguel Lawrence MD   ondansetron (ZOFRAN) 4 MG tablet Take 1 tablet by mouth 3 times daily as needed for Nausea or Vomiting 12/28/18   Miguel Lawrence MD   Blood Glucose Monitoring Suppl (FREESTYLE LITE) GAETANO 1 Device by Does not apply route 4 times daily Patient to check blood sugar 4 times a day and PRN, she is treated with multiple daily injections of insulin that require correction dosing ;A1C 8.1 ; ICD code-E11.65 ,Z79.4  Patient taking differently: 1 Device by Does not apply route 2 times daily  9/4/18   SOLDEAD Martinez CNP   blood glucose test strips (FREESTYLE LITE) strip 1 each by Does not apply route 4 times daily Patient to check blood sugar 4 times a day and PRN, she is treated with multiple daily injections of insulin that require correction dosing ;A1C 8.1 ; ICD code-E11.65 ,Z79.4  Patient taking differently: 1 each by Does not apply route 2 times daily  9/4/18   SOLEDAD Martinez CNP   FREESTYLE LANCETS MISC 1 each by Does not apply route 4 times daily Patient to check blood sugar 4 times a day and PRN, she is treated with multiple daily injections of insulin that

## 2019-04-26 NOTE — PROGRESS NOTES
[facility] PROGRESS NOTE    4/26/2019 8:11 AM        Name: Mireille Enamorado . Admitted: 4/24/2019  Primary Care Provider: Chichi Tavares MD (Tel: [phone])    Brief Course:        CC: back pain    Subjective:  .      at bedside  Patient feels better than yesterday   4/10 intensity pain  Did work with therapy    H/o similar pains in the past    Reviewed interval ancillary notes    Current Medications    aspirin chewable tablet 81 mg Daily   carvedilol (COREG) tablet 3.125 mg BID WC   digoxin (LANOXIN) tablet 125 mcg Daily   montelukast (SINGULAIR) tablet 10 mg Nightly   pantoprazole (PROTONIX) tablet 40 mg QAM AC   potassium chloride (KLOR-CON M) extended release tablet 10 mEq Daily   rosuvastatin (CRESTOR) tablet 20 mg Daily   spironolactone (ALDACTONE) tablet 25 mg Daily   torsemide (DEMADEX) tablet 20 mg Daily   sodium chloride flush 0.9 % injection 10 mL 2 times per day   sodium chloride flush 0.9 % injection 10 mL PRN   magnesium hydroxide (MILK OF MAGNESIA) 400 MG/5ML suspension 30 mL Daily PRN   ondansetron (ZOFRAN) injection 4 mg Q6H PRN   0.9 % sodium chloride infusion Continuous   potassium chloride (KLOR-CON M) extended release tablet 40 mEq PRN   Or    potassium bicarb-citric acid (EFFER-K) effervescent tablet 40 mEq PRN   Or    potassium chloride 10 mEq/100 mL IVPB (Peripheral Line) PRN   magnesium sulfate 1 g in dextrose 5% 100 mL IVPB PRN   acetaminophen (TYLENOL) tablet 650 mg Q4H PRN   glucose (GLUTOSE) 40 % oral gel 15 g PRN   dextrose 50 % solution 12.5 g PRN   glucagon (rDNA) injection 1 mg PRN   dextrose 5 % solution PRN   HYDROmorphone HCl PF (DILAUDID) injection 1 mg Q3H PRN   diazepam (VALIUM) tablet 5 mg Q8H   dexamethasone (DECADRON) injection 4 mg Q6H   warfarin (COUMADIN) tablet 5 mg Daily   insulin lispro (HUMALOG) injection pen 0-6 Units 4x Daily WC       Objective:  /66 Pulse 76   Temp 96.8 °F (36 °C) (Temporal)   Resp 18   Ht 5' 8\" (1.727 m)   Wt 206 lb 9.6 oz (93.7 kg)   SpO2 93%   BMI 31.41 kg/m²     Intake/Output Summary (Last 24 hours) at 4/26/2019 0811  Last data filed at 4/25/2019 1805  Gross per 24 hour   Intake 1140 ml   Output --   Net 1140 ml      Wt Readings from Last 3 Encounters:   04/25/19 206 lb 9.6 oz (93.7 kg)   04/24/19 200 lb (90.7 kg)   04/12/19 204 lb (92.5 kg)     Lower thoracic region tenderness noticed   General appearance:  Appears comfortable  Eyes: Sclera clear. Pupils equal.  ENT: Moist oral mucosa. Trachea midline, no adenopathy. Cardiovascular: Regular rhythm, normal S1, S2. No murmur. No edema in lower extremities  Respiratory: Not using accessory muscles. Good inspiratory effort. Clear to auscultation bilaterally, no wheeze or crackles. GI: Abdomen soft, no tenderness, not distended, normal bowel sounds  Musculoskeletal: No cyanosis in digits, neck supple  Neurology: CN 2-12 grossly intact. No speech or motor deficits  Psych: Normal affect. Alert and oriented in time, place and person  Skin: Warm, dry, normal turgor  Extremity exam shows brisk capillary refill. Peripheral pulses are palpable in lower extremities     Labs and Tests:  CBC:   Recent Labs     04/24/19 1806 04/25/19 0455 04/26/19  0446   WBC 7.0 5.0 5.3   HGB 12.8 10.6* 10.9*    171 153     BMP:    Recent Labs     04/24/19 1806 04/25/19  0455 04/26/19  0446    142 138   K 4.4 3.6 4.0    108 104   CO2 24 26 25   BUN 22* 22* 24*   CREATININE 1.2 0.9 0.9   GLUCOSE 159* 113* 364*     Hepatic:   Recent Labs     04/24/19 1806 04/25/19  0455   AST 21 12*   ALT 16 12   BILITOT 0.4 <0.2   ALKPHOS 94 75     CT THORACIC SPINE W CONTRAST   Final Result   1. Moderate and severe multilevel degenerative disc disease throughout the   mid and lower thoracic spine.    2. At T11-T12, there is left paracentral disc protrusion which contributes to   moderate spinal canal stenosis. 3. Diffuse osteopenia. CTA ABDOMEN PELVIS W CONTRAST   Final Result   1. Moderate diffuse atherosclerotic disease. No evidence for aortic aneurysm   or dissection. 2. Enlarged pulmonary trunk noted which can be seen in pulmonary hypertension. 3. Stable sub 5 mm lung nodules described in detail above. No follow-up   imaging recommended. These dates back to July 2016.   4. Cardiomegaly. 5. A few mildly dilated left mid abdomen small bowel loops without transition   point. Recommend close observation and follow-up to exclude developing ileus   or obstruction. 6. Unchanged 8.2 x 6.0 cm left adnexal cystic mass. Recommend follow-up   pelvic MRI with IV contrast or surgical evaluation. CTA CHEST W CONTRAST   Final Result   1. Moderate diffuse atherosclerotic disease. No evidence for aortic aneurysm   or dissection. 2. Enlarged pulmonary trunk noted which can be seen in pulmonary hypertension. 3. Stable sub 5 mm lung nodules described in detail above. No follow-up   imaging recommended. These dates back to July 2016.   4. Cardiomegaly. 5. A few mildly dilated left mid abdomen small bowel loops without transition   point. Recommend close observation and follow-up to exclude developing ileus   or obstruction. 6. Unchanged 8.2 x 6.0 cm left adnexal cystic mass. Recommend follow-up   pelvic MRI with IV contrast or surgical evaluation. XR CHEST STANDARD (2 VW)   Final Result   No acute cardiopulmonary disease. Discussed care with family          Assessment & Plan:   Acute thoracic region radiculopathy T11/12 disc prolapse  H/o same in the past  Steroids and valium for now along with prn iv opioids   Consult Neurosurgery,     Uncontrolled DM with steroid induced hyperglycemia  Resume lantus, high dose sliding scale    Ovarian lesion will need dedicated MRI with contrast   Unchanged from previous scan     ? ?  Ileus  No gi symptoms  Advance diet   Surgery

## 2019-04-27 VITALS
OXYGEN SATURATION: 96 % | WEIGHT: 209.9 LBS | DIASTOLIC BLOOD PRESSURE: 72 MMHG | HEIGHT: 68 IN | RESPIRATION RATE: 18 BRPM | TEMPERATURE: 97.4 F | SYSTOLIC BLOOD PRESSURE: 118 MMHG | HEART RATE: 66 BPM | BODY MASS INDEX: 31.81 KG/M2

## 2019-04-27 LAB
ANION GAP SERPL CALCULATED.3IONS-SCNC: 9 MMOL/L (ref 3–16)
BUN BLDV-MCNC: 31 MG/DL (ref 7–20)
CALCIUM SERPL-MCNC: 8.6 MG/DL (ref 8.3–10.6)
CHLORIDE BLD-SCNC: 101 MMOL/L (ref 99–110)
CO2: 27 MMOL/L (ref 21–32)
CREAT SERPL-MCNC: 1 MG/DL (ref 0.6–1.2)
GFR AFRICAN AMERICAN: >60
GFR NON-AFRICAN AMERICAN: 54
GLUCOSE BLD-MCNC: 274 MG/DL (ref 70–99)
GLUCOSE BLD-MCNC: 317 MG/DL (ref 70–99)
GLUCOSE BLD-MCNC: 333 MG/DL (ref 70–99)
HCT VFR BLD CALC: 31 % (ref 36–48)
HEMOGLOBIN: 10.2 G/DL (ref 12–16)
INR BLD: 1.67 (ref 0.86–1.14)
MCH RBC QN AUTO: 28.2 PG (ref 26–34)
MCHC RBC AUTO-ENTMCNC: 33 G/DL (ref 31–36)
MCV RBC AUTO: 85.7 FL (ref 80–100)
PDW BLD-RTO: 15.8 % (ref 12.4–15.4)
PERFORMED ON: ABNORMAL
PERFORMED ON: ABNORMAL
PLATELET # BLD: 179 K/UL (ref 135–450)
PMV BLD AUTO: 8.9 FL (ref 5–10.5)
POTASSIUM REFLEX MAGNESIUM: 4.2 MMOL/L (ref 3.5–5.1)
PROTHROMBIN TIME: 19 SEC (ref 9.8–13)
RBC # BLD: 3.62 M/UL (ref 4–5.2)
SODIUM BLD-SCNC: 137 MMOL/L (ref 136–145)
WBC # BLD: 7.8 K/UL (ref 4–11)

## 2019-04-27 PROCEDURE — 2580000003 HC RX 258: Performed by: HOSPITALIST

## 2019-04-27 PROCEDURE — 6360000002 HC RX W HCPCS: Performed by: INTERNAL MEDICINE

## 2019-04-27 PROCEDURE — 6370000000 HC RX 637 (ALT 250 FOR IP): Performed by: HOSPITALIST

## 2019-04-27 PROCEDURE — 85027 COMPLETE CBC AUTOMATED: CPT

## 2019-04-27 PROCEDURE — 6370000000 HC RX 637 (ALT 250 FOR IP): Performed by: INTERNAL MEDICINE

## 2019-04-27 PROCEDURE — 80048 BASIC METABOLIC PNL TOTAL CA: CPT

## 2019-04-27 PROCEDURE — 85610 PROTHROMBIN TIME: CPT

## 2019-04-27 PROCEDURE — 36415 COLL VENOUS BLD VENIPUNCTURE: CPT

## 2019-04-27 RX ORDER — METHYLPREDNISOLONE 4 MG/1
TABLET ORAL
Qty: 1 KIT | Refills: 0 | Status: SHIPPED | OUTPATIENT
Start: 2019-04-27 | End: 2019-05-03

## 2019-04-27 RX ORDER — WARFARIN SODIUM 7.5 MG/1
7.5 TABLET ORAL
Status: DISCONTINUED | OUTPATIENT
Start: 2019-04-27 | End: 2019-04-27 | Stop reason: HOSPADM

## 2019-04-27 RX ORDER — SENNA PLUS 8.6 MG/1
2 TABLET ORAL
Qty: 20 TABLET | Refills: 11 | Status: SHIPPED | OUTPATIENT
Start: 2019-04-27 | End: 2019-06-07

## 2019-04-27 RX ORDER — DOCUSATE SODIUM 100 MG/1
100 CAPSULE, LIQUID FILLED ORAL 2 TIMES DAILY
Qty: 60 CAPSULE | Refills: 1 | Status: SHIPPED | OUTPATIENT
Start: 2019-04-27 | End: 2019-06-07 | Stop reason: DRUGHIGH

## 2019-04-27 RX ORDER — METHOCARBAMOL 750 MG/1
750 TABLET, FILM COATED ORAL 4 TIMES DAILY PRN
Qty: 40 TABLET | Refills: 1 | Status: SHIPPED | OUTPATIENT
Start: 2019-04-27 | End: 2019-05-07

## 2019-04-27 RX ORDER — POLYETHYLENE GLYCOL 3350 17 G/17G
17 POWDER, FOR SOLUTION ORAL
Qty: 255 G | Refills: 0 | Status: SHIPPED | OUTPATIENT
Start: 2019-04-27 | End: 2019-06-07 | Stop reason: SDUPTHER

## 2019-04-27 RX ORDER — WARFARIN SODIUM 5 MG/1
5 TABLET ORAL DAILY
Status: DISCONTINUED | OUTPATIENT
Start: 2019-04-28 | End: 2019-04-27 | Stop reason: HOSPADM

## 2019-04-27 RX ORDER — OXYCODONE HYDROCHLORIDE 5 MG/1
5 TABLET ORAL EVERY 4 HOURS PRN
Qty: 30 TABLET | Refills: 0 | Status: SHIPPED | OUTPATIENT
Start: 2019-04-27 | End: 2019-05-02

## 2019-04-27 RX ADMIN — DEXAMETHASONE SODIUM PHOSPHATE 4 MG: 4 INJECTION, SOLUTION INTRAMUSCULAR; INTRAVENOUS at 11:06

## 2019-04-27 RX ADMIN — TORSEMIDE 20 MG: 20 TABLET ORAL at 08:50

## 2019-04-27 RX ADMIN — DIAZEPAM 5 MG: 5 TABLET ORAL at 01:26

## 2019-04-27 RX ADMIN — DIAZEPAM 5 MG: 5 TABLET ORAL at 11:06

## 2019-04-27 RX ADMIN — Medication 10 ML: at 11:08

## 2019-04-27 RX ADMIN — DIGOXIN 125 MCG: 125 TABLET ORAL at 08:51

## 2019-04-27 RX ADMIN — INSULIN LISPRO 12 UNITS: 100 INJECTION, SOLUTION INTRAVENOUS; SUBCUTANEOUS at 12:59

## 2019-04-27 RX ADMIN — SPIRONOLACTONE 25 MG: 25 TABLET ORAL at 08:51

## 2019-04-27 RX ADMIN — PANTOPRAZOLE SODIUM 40 MG: 40 TABLET, DELAYED RELEASE ORAL at 06:13

## 2019-04-27 RX ADMIN — ASPIRIN 81 MG 81 MG: 81 TABLET ORAL at 08:50

## 2019-04-27 RX ADMIN — CARVEDILOL 3.12 MG: 3.12 TABLET, FILM COATED ORAL at 08:51

## 2019-04-27 RX ADMIN — DEXAMETHASONE SODIUM PHOSPHATE 4 MG: 4 INJECTION, SOLUTION INTRAMUSCULAR; INTRAVENOUS at 04:07

## 2019-04-27 RX ADMIN — INSULIN LISPRO 12 UNITS: 100 INJECTION, SOLUTION INTRAVENOUS; SUBCUTANEOUS at 08:51

## 2019-04-27 RX ADMIN — INSULIN GLARGINE 55 UNITS: 100 INJECTION, SOLUTION SUBCUTANEOUS at 08:52

## 2019-04-27 RX ADMIN — Medication 10 ML: at 04:08

## 2019-04-27 RX ADMIN — POTASSIUM CHLORIDE 10 MEQ: 1500 TABLET, EXTENDED RELEASE ORAL at 08:50

## 2019-04-27 RX ADMIN — ROSUVASTATIN CALCIUM 20 MG: 20 TABLET, FILM COATED ORAL at 08:51

## 2019-04-27 ASSESSMENT — PAIN DESCRIPTION - LOCATION: LOCATION: BACK

## 2019-04-27 ASSESSMENT — PAIN SCALES - GENERAL
PAINLEVEL_OUTOF10: 2
PAINLEVEL_OUTOF10: 3

## 2019-04-27 ASSESSMENT — PAIN DESCRIPTION - PAIN TYPE: TYPE: CHRONIC PAIN

## 2019-04-27 NOTE — PROGRESS NOTES
Data- discharge order received, pt verbalized agreement to discharge, disposition to previous residence, no needs for HHC/DME. Action- discharge instructions prepared and given to patient, pt verbalized understanding. Medication information packet given r/t NEW and/or CHANGED prescriptions emphasizing name/purpose/side effects, pt verbalized understanding. Discharge instruction summary: Diet- General, Activity- As tolerated, Primary Care Physician as follows: Caitlyn Mathews -[phone] f/u appointment Within one week, immunizations reviewed and updated, prescription medications filled at [facility]. Response- Pt belongings gathered, IV removed. Disposition is home (no HHC/DME needs), transported with spouse, taken to lobby via w/c w/ RN, no complications.

## 2019-04-27 NOTE — PLAN OF CARE
Problem: Pain:  Goal: Pain level will decrease  Description  Pain level will decrease  4/27/2019 0620 by Venkatesh Nathan RN  Outcome: Ongoing  Note:   Pain has been mild this shift, no PRN meds required. Problem: Cardiovascular  Goal: Hemodynamic stability  Outcome: Ongoing  Note:   VSS this shift, remains SR with frequent PACs, PVCs on telemetry. Problem: Respiratory  Goal: O2 Sat > 90%  Outcome: Ongoing  Note:   O2 sat marginal this shift, low of 87% after ambulation but recovered to 94%, 91% @rest.     Problem: Serum Glucose Level - Abnormal:  Goal: Ability to maintain appropriate glucose levels has stabilized  Description  Ability to maintain appropriate glucose levels has stabilized  Outcome: Ongoing  Note:   Glucose remains elevated secondary to steroids, 274 on am labs.

## 2019-04-27 NOTE — PLAN OF CARE
Problem: HEMODYNAMIC STATUS  Goal: Patient has stable vital signs and fluid balance  Outcome: Met This Shift     Problem: Pain:  Goal: Control of chronic pain  Description  Control of chronic pain  4/27/2019 0154 by Tiesha Katz RN  Outcome: Ongoing     Assessment complete, VSS. Pt c/o mid and lower back pain 5/10. Pt states that the pain is \" bearable\" but she would prefer to be pain free. Pt medicated for pain per [facility], will continue to monitor.  Deena Jones RN

## 2019-04-27 NOTE — DISCHARGE SUMMARY
Hospital Discharge Summary    Patient's PCP: Josiane Mar MD  Admit Date: 4/24/2019   Discharge Date: 4/27/2019    Admitting Physician: Dr. Marlys Byrnes MD  Discharge Physician: Dr. Barron Main:   IP CONSULT TO HOSPITALIST  IP CONSULT TO CARDIOLOGY  IP CONSULT TO GENERAL SURGERY  IP CONSULT TO PHARMACY  IP CONSULT TO NEUROSURGERY    Brief HPI:   Lia Story is a 68 y.o. female who presented with   · Lower back pain X 6 days . Pain starts in mid spine region near the Bra strap region and then radiates B/L around the lower rib cage anteriorly . Endorses it as a sharp pain . Worsening in intensity in last 6 days or so   · No N/V/D   · Last BM yesterday   · Takes stool softeners @ Home d/t constipation issues   · No chest pressure or tightness or palpitations reported anteriorly . No jaw pain  . No left shoulder pain   · On blood thinners @ Home for AFIB . Is on coumadin @ Home         History obtained from   · Patient      · Prior chart  As well         So far, ED Findings/Workup/Labs shows :   · BP. HTN   · HR. Tachycardia    · Saturations. Stable   · Temperature. Afebrile        · Imaging done in ED as below. And is/are s/o multiple findings . ?  Ileus vs early SBO   · Pertinent Abnormal Labs and assessment as below.         While in ED, patient care and medications given :   · Imaging  CXR +  CTA  , done in ED   · CTA done in ED   · ASA X 1   · Dilaudid   · Zofran   · IVF/NS         Currently, on my evaluation, patient is :   · Since arrival, post above Rx, patient reports improvement s/p pain medications in ED   · No chest pain   · Is AO X 3   ·  @ bedside   · No s/o distress noted         Brief hospital course:   Acute thoracic region radiculopathy T11/12 disc prolapse  H/o same in the past  Steroids and valium for now along with prn iv opioids   Consult Neurosurgery,      Uncontrolled DM with steroid induced hyperglycemia  Resume lantus, high dose sliding scale     Ovarian lesion will need dedicated MRI with contrast   Unchanged from previous scan      ?? Ileus  No gi symptoms  Advance diet   Surgery following      afib  Coumadin to resume     H/o MVR and CABG in 2018   Cardiology input noticed    Pt evaluated by NS and GS and cleared by both, will complete medrol dose ne, take muscle relaxers and pain meds, will need to f/u w/ NS outpt. Discharge Diagnoses: Active Problems:    Long term current use of anticoagulant    Mixed hyperlipidemia    Essential hypertension    Supratherapeutic INR    History of pulmonary embolism    Multiple pulmonary nodules    Atrial fibrillation (HCC)    Hypertension    Cardiac arrhythmia    CAD (coronary artery disease)    Non-rheumatic mitral regurgitation    S/P mitral valve replacement with bioprosthetic valve    Status post aorto-coronary artery bypass graft    S/P Maze operation for atrial fibrillation    Goiter    Squamous cell carcinoma of lip    Acute encephalopathy    Nausea and vomiting    Chronic combined systolic (congestive) and diastolic (congestive) heart failure (HCC)    Abdominal pain    Abnormal CT scan, small bowel    Partial small bowel obstruction (HCC)  Resolved Problems:    * No resolved hospital problems. *      Physical Exam: /66   Pulse 89   Temp 97.6 °F (36.4 °C) (Temporal)   Resp 16   Ht 5' 8\" (1.727 m)   Wt 209 lb 14.4 oz (95.2 kg)   SpO2 91%   BMI 31.92 kg/m²   Lower thoracic region tenderness noticed   General appearance:  Appears comfortable  Eyes: Sclera clear. Pupils equal.  ENT: Moist oral mucosa. Trachea midline, no adenopathy. Cardiovascular: Regular rhythm, normal S1, S2. No murmur. No edema in lower extremities  Respiratory: Not using accessory muscles. Good inspiratory effort. Clear to auscultation bilaterally, no wheeze or crackles.    GI: Abdomen soft, no tenderness, not distended, normal bowel sounds  Musculoskeletal: No cyanosis in digits, neck supple  Neurology: CN 2-12 disc disease at T11-T12 with left paracentral disc bulge contributing to moderate spinal canal stenosis. SOFT TISSUES: No paraspinal mass is seen. 1. Moderate and severe multilevel degenerative disc disease throughout the mid and lower thoracic spine. 2. At T11-T12, there is left paracentral disc protrusion which contributes to moderate spinal canal stenosis. 3. Diffuse osteopenia. Cta Chest W Contrast    Result Date: 4/24/2019  EXAMINATION: CTA OF THE CHEST; CTA OF THE ABDOMEN AND PELVIS WITH CONTRAST 4/24/2019 7:57 pm: TECHNIQUE: CTA of the chest was performed after the administration of intravenous contrast.  Multiplanar reformatted images are provided for review. MIP images are provided for review. Dose modulation, iterative reconstruction, and/or weight based adjustment of the mA/kV was utilized to reduce the radiation dose to as low as reasonably achievable.; CTA of the abdomen and pelvis was performed with the administration of intravenous contrast. Multiplanar reformatted images are provided for review. MIP images are provided for review. Dose modulation, iterative reconstruction, and/or weight based adjustment of the mA/kV was utilized to reduce the radiation dose to as low as reasonably achievable. Curved reformats and 3D volume rendered reformats also obtained of aorta. COMPARISON: CT abdomen pelvis 01/17/2019, CT chest 07/06/2016 HISTORY: ORDERING SYSTEM PROVIDED HISTORY: back pain atraumatic TECHNOLOGIST PROVIDED HISTORY: Ordering Physician Provided Reason for Exam: back pain atraumatic Acuity: Acute Type of Exam: Initial Relevant Medical/Surgical History: Back Pain (Pt reports back pain in her lower back x 6 days. Hx of arthritis. Pt took pain meds at 3 then 5pm without relief.  Has UTI. ); ORDERING SYSTEM PROVIDED HISTORY: atraumatic back pain TECHNOLOGIST PROVIDED HISTORY: Ordering Physician Provided Reason for Exam: atraumatic back pain Acuity: Acute Type of Exam: Initial Relevant Medical/Surgical History: Back Pain (Pt reports back pain in her lower back x 6 days. Hx of arthritis. Pt took pain meds at 3 then 5pm without relief. Has UTI. ) FINDINGS: CTA CHEST: Vascular: Atherosclerotic disease. No evidence for thoracic aorta aneurysm or dissection. Previous folic trunk and left common carotid artery have a common origin from the arch. Note is made of enlarged pulmonary trunk which can be seen in pulmonary hypertension. Mediastinum: Cardiomegaly. Coronary artery calcifications. No significant mediastinal, hilar or axillary lymphadenopathy. Thyroid gland grossly normal.  Patulous distal esophagus. Lungs/pleura: Small pleural-parenchymal density posterior right lung base suggestive of scarring unchanged from 2016. There is calcified 1 cm anterior right upper lobe nodule. Noncalcified right upper lobe 3 mm nodule noted in the periphery of the posterior segment inferior aspect. Similar nodule noted on series 3, image 87 in the anterior aspect superior segment right lower lobe. 4 mm left lower lobe nodule on image 64. These unchanged from 2016. No pleural effusion or pneumothorax. Bones/soft tissues: Median sternotomy. Diffuse degenerative changes in the thoracic spine. No acute fracture evident. CTA ABDOMEN/PELVIS: Vascular: The abdominal aorta shows no evidence for aneurysm or dissection. There is moderate atherosclerotic disease. Atherosclerotic disease also noted in celiac axis and branches as well as in the SMA, iliac arteries. No evidence for aneurysm or dissection in the celiac axis, SMA, SAMANTHA or iliac arteries. Normal enhancement of renal arteries. Organs: The liver, spleen, pancreas, kidneys, adrenal glands show no significant abnormalities. Cholecystectomy noted. GI tract: There is limited evaluation due to absence of oral contrast.  Stomach shows no focal lesions. A few mildly dilated left mid abdomen small bowel loops are noted without evidence for a transition point. Developing ileus or obstruction not excluded. Follow-up would be advised. No evidence for acute appendicitis. No acute process in the colon. Pelvis: Urinary bladder grossly normal.  Uterus present. Left adnexal cystic mass 8.2 x 6.0 cm (AP by transverse) unchanged from January 2019. Peritoneum/Retroperitoneum: No free intraperitoneal fluid. No significant lymphadenopathy. A 9 mm mesenteric lymph node in the left mid abdomen on series 5, image 161 is unchanged. Bones/Soft tissues: Diffuse degenerative changes in the spine. 1. Moderate diffuse atherosclerotic disease. No evidence for aortic aneurysm or dissection. 2. Enlarged pulmonary trunk noted which can be seen in pulmonary hypertension. 3. Stable sub 5 mm lung nodules described in detail above. No follow-up imaging recommended. These dates back to July 2016. 4. Cardiomegaly. 5. A few mildly dilated left mid abdomen small bowel loops without transition point. Recommend close observation and follow-up to exclude developing ileus or obstruction. 6. Unchanged 8.2 x 6.0 cm left adnexal cystic mass. Recommend follow-up pelvic MRI with IV contrast or surgical evaluation. Cta Abdomen Pelvis W Contrast    Result Date: 4/24/2019  EXAMINATION: CTA OF THE CHEST; CTA OF THE ABDOMEN AND PELVIS WITH CONTRAST 4/24/2019 7:57 pm: TECHNIQUE: CTA of the chest was performed after the administration of intravenous contrast.  Multiplanar reformatted images are provided for review. MIP images are provided for review. Dose modulation, iterative reconstruction, and/or weight based adjustment of the mA/kV was utilized to reduce the radiation dose to as low as reasonably achievable.; CTA of the abdomen and pelvis was performed with the administration of intravenous contrast. Multiplanar reformatted images are provided for review. MIP images are provided for review.  Dose modulation, iterative reconstruction, and/or weight based adjustment of the mA/kV was utilized to reduce the radiation dose to as low as reasonably achievable. Curved reformats and 3D volume rendered reformats also obtained of aorta. COMPARISON: CT abdomen pelvis 01/17/2019, CT chest 07/06/2016 HISTORY: ORDERING SYSTEM PROVIDED HISTORY: back pain atraumatic TECHNOLOGIST PROVIDED HISTORY: Ordering Physician Provided Reason for Exam: back pain atraumatic Acuity: Acute Type of Exam: Initial Relevant Medical/Surgical History: Back Pain (Pt reports back pain in her lower back x 6 days. Hx of arthritis. Pt took pain meds at 3 then 5pm without relief. Has UTI. ); ORDERING SYSTEM PROVIDED HISTORY: atraumatic back pain TECHNOLOGIST PROVIDED HISTORY: Ordering Physician Provided Reason for Exam: atraumatic back pain Acuity: Acute Type of Exam: Initial Relevant Medical/Surgical History: Back Pain (Pt reports back pain in her lower back x 6 days. Hx of arthritis. Pt took pain meds at 3 then 5pm without relief. Has UTI. ) FINDINGS: CTA CHEST: Vascular: Atherosclerotic disease. No evidence for thoracic aorta aneurysm or dissection. Previous folic trunk and left common carotid artery have a common origin from the arch. Note is made of enlarged pulmonary trunk which can be seen in pulmonary hypertension. Mediastinum: Cardiomegaly. Coronary artery calcifications. No significant mediastinal, hilar or axillary lymphadenopathy. Thyroid gland grossly normal.  Patulous distal esophagus. Lungs/pleura: Small pleural-parenchymal density posterior right lung base suggestive of scarring unchanged from 2016. There is calcified 1 cm anterior right upper lobe nodule. Noncalcified right upper lobe 3 mm nodule noted in the periphery of the posterior segment inferior aspect. Similar nodule noted on series 3, image 87 in the anterior aspect superior segment right lower lobe. 4 mm left lower lobe nodule on image 64. These unchanged from 2016. No pleural effusion or pneumothorax. Bones/soft tissues: Median sternotomy.   Diffuse degenerative changes in the thoracic spine. No acute fracture evident. CTA ABDOMEN/PELVIS: Vascular: The abdominal aorta shows no evidence for aneurysm or dissection. There is moderate atherosclerotic disease. Atherosclerotic disease also noted in celiac axis and branches as well as in the SMA, iliac arteries. No evidence for aneurysm or dissection in the celiac axis, SMA, SAMANTHA or iliac arteries. Normal enhancement of renal arteries. Organs: The liver, spleen, pancreas, kidneys, adrenal glands show no significant abnormalities. Cholecystectomy noted. GI tract: There is limited evaluation due to absence of oral contrast.  Stomach shows no focal lesions. A few mildly dilated left mid abdomen small bowel loops are noted without evidence for a transition point. Developing ileus or obstruction not excluded. Follow-up would be advised. No evidence for acute appendicitis. No acute process in the colon. Pelvis: Urinary bladder grossly normal.  Uterus present. Left adnexal cystic mass 8.2 x 6.0 cm (AP by transverse) unchanged from January 2019. Peritoneum/Retroperitoneum: No free intraperitoneal fluid. No significant lymphadenopathy. A 9 mm mesenteric lymph node in the left mid abdomen on series 5, image 161 is unchanged. Bones/Soft tissues: Diffuse degenerative changes in the spine. 1. Moderate diffuse atherosclerotic disease. No evidence for aortic aneurysm or dissection. 2. Enlarged pulmonary trunk noted which can be seen in pulmonary hypertension. 3. Stable sub 5 mm lung nodules described in detail above. No follow-up imaging recommended. These dates back to July 2016. 4. Cardiomegaly. 5. A few mildly dilated left mid abdomen small bowel loops without transition point. Recommend close observation and follow-up to exclude developing ileus or obstruction. 6. Unchanged 8.2 x 6.0 cm left adnexal cystic mass. Recommend follow-up pelvic MRI with IV contrast or surgical evaluation.              Treatments: As above. Discharge Medications:     Medication List      STOP taking these medications    colchicine 0.6 MG tablet  Commonly known as:  COLCRYS     dicyclomine 10 MG capsule  Commonly known as:  BENTYL        ASK your doctor about these medications    albuterol sulfate  (90 Base) MCG/ACT inhaler  Commonly known as:  PROAIR HFA  Inhale 2 puffs into the lungs every 6 hours as needed for Wheezing  Notes to patient:  Use:bronchodilator, to open airways, rescue inhaler  Side effects: nervousness, jitteriness, shakiness, headache, fast heartbeat     aspirin 81 MG chewable tablet  Take 1 tablet by mouth daily  Notes to patient:  Use: prevents heart attacks and strokes. Side effects: bleeding or bruising more easily, stomach upset.      BD PEN NEEDLE JANNET U/F 32G X 4 MM Misc  Generic drug:  Insulin Pen Needle  USE 1 PEN NEEDLE FOUR TIMES A DAY  Notes to patient:  Equipment (supplies)     blood glucose test strips strip  Commonly known as:  FREESTYLE LITE  1 each by Does not apply route 4 times daily Patient to check blood sugar 4 times a day and PRN, she is treated with multiple daily injections of insulin that require correction dosing ;A1C 8.1 ; ICD code-E11.65 ,Z79.4  Notes to patient:  Equipment (supplies)     carvedilol 3.125 MG tablet  Commonly known as:  COREG  Take 1 tablet by mouth 2 times daily (with meals)  Notes to patient:  Use: treat heart failure, prevent future heart attacks, lower blood pressure and heart rate, treat chest pain  Side effects: dizziness, fatigue, and diarrhea     cyanocobalamin 500 MCG tablet  Take 1 tablet by mouth daily  Notes to patient:  Use: treatment of anemia and/or vitamin B12 deficiency  Side effects: dizziness, headache, anxiety, nausea     digoxin 125 MCG tablet  Commonly known as:  LANOXIN  Take 1 tablet by mouth daily  Notes to patient:  Use: lower heart rate  Side effects: dizziness, upset stomach, and diarrhea     exenatide 10 MCG/0.04ML injection  Commonly known as: BYETTA 10 MCG PEN  Inject 0.04 mLs into the skin 2 times daily (with meals)  Notes to patient:  USE: management of diabetes  Side Effects: irritation at injection site; nausea, altered bowel regularity; heartburn; decreased appetite     FREESTYLE LANCETS Misc  1 each by Does not apply route 4 times daily Patient to check blood sugar 4 times a day and PRN, she is treated with multiple daily injections of insulin that require correction dosing ;A1C 8.1 ; ICD code-E11.65 ,Z79.4  Notes to patient:  Equipment (supplies)     FREESTYLE LITE Mei  1 Device by Does not apply route 4 times daily Patient to check blood sugar 4 times a day and PRN, she is treated with multiple daily injections of insulin that require correction dosing ;A1C 8.1 ; ICD code-E11.65 ,Z79.4  Notes to patient:  Equipment (supplies)     insulin glargine 100 UNIT/ML injection pen  Commonly known as:  LANTUS  Inject 55 Units into the skin every morning  Notes to patient:  Use: treats diabetes or high blood sugar.  Long acting insulin  Side effects: Low blood sugar, irritation at the injection site     montelukast 10 MG tablet  Commonly known as:  SINGULAIR  TAKE 1 TABLET NIGHTLY  Notes to patient:  Use: prevention and treatment of asthma  Side effects: diarrhea, headache, stomach upset, sore throat     nitrofurantoin (macrocrystal-monohydrate) 100 MG capsule  Commonly known as:  MACROBID  Take 1 capsule by mouth 2 times daily for 10 days  Notes to patient:  USE: treatment of urinary tract infection  Side Effects: nausea, loose stool, vaginal itching, rust colored or brown urine, headache, gas     nystatin 848615 UNIT/ML suspension  Commonly known as:  MYCOSTATIN  1 teaspoon 4 times daily x 5 d  Notes to patient:  USE: treatment of oral yeast infections  Side Effects: nausea, headache, gas, diarrhea, rash, slow heart rate     omeprazole 40 MG delayed release capsule  Commonly known as:  PRILOSEC  Take 1 capsule by mouth every morning (before breakfast)  Notes to patient:  Use: prevention and treatment of gastric ulcers and/or heartburn  Side effects: headache, fatigue, constipation, dry mouth      ondansetron 4 MG tablet  Commonly known as:  ZOFRAN  Take 1 tablet by mouth 3 times daily as needed for Nausea or Vomiting  Notes to patient:  Use: nausea and vomiting  Side effects: headache, weakness or dizziness     oxyCODONE HCl 5 MG Taba  Notes to patient:  USE: management of moderate to severe pain  Side Effects: nausea, constipation, drowsiness, lightheadedness, unsteady balance     polyethylene glycol powder  Commonly known as:  GLYCOLAX  FILL DOSING CUP TO MARKED LINE (17 GRAMS) THEN MIX IN LIQUID AND DRINK DAILY  Notes to patient:  USE: maintenance of bowel regularity  Side Effects: gas, bloating, nausea     potassium chloride 10 MEQ extended release tablet  Commonly known as:  KLOR-CON M  Take 1 tablet by mouth daily  Notes to patient:  Use:  Replacement of electrolytes when on a diuretic  Side Effects: Nausea, loss of appetite, confusion, headache     predniSONE 10 MG tablet  Commonly known as:  DELTASONE  TAKE 1 TABLET AS NEEDED (EOSINOPHILIC GASTRITIS)  Notes to patient:  Use: treat asthma and copd, inflammation  Side effects: upset stomach, high blood sugars, weight gain, mood changes, and increased chances of infection     rosuvastatin 20 MG tablet  Commonly known as:  CRESTOR  Take 1 tablet by mouth daily  Notes to patient:  Use: lower cholesterol; prevent heart attack/stroke  Side effects: headache, muscle pain/weakness     spironolactone 25 MG tablet  Commonly known as:  ALDACTONE  Take 1 tablet by mouth daily  Notes to patient:  Use: treat heart failure, fluid retention, lower blood pressure.        Side effects: frequent urination, weakness, muscle cramps, increased sensitivity to light, nausea, and dizziness     terbinafine 250 MG tablet  Commonly known as:  LAMISIL  TAKE 1 TABLET DAILY  Notes to patient:  USE: antifungal for skin infections  Side Effects: nausea, diarrhea, headache, itching, altered taste, abnormal liver tests     torsemide 20 MG tablet  Commonly known as:  DEMADEX  Take 1 tablet by mouth daily  Notes to patient:  Use: treat heart failure, fluid retention, lower blood pressure. Side effects: frequent urination, weakness, muscle cramps, increased sensitivity to light, nausea, and dizziness     warfarin 5 MG tablet  Commonly known as:  COUMADIN  Take as directed. If you are unsure how to take this medication, talk to your nurse or doctor. Original instructions: Take 1 tablet by mouth daily  Notes to patient:  USE: long acting blood thinner; reduces risk of stroke  Side Effects: increased risk of bleeding, bruising            Activity: activity as tolerated  Diet: DIET DENTAL SOFT;      Disposition: home  Discharged Condition: Stable  Follow Up: Pratik Jay MD  [address] [place] [address]  [phone]              Code status:  Full Code         Total time spent on discharge, finalizing medications, referrals and arranging outpatient follow up was more than 1 hour      Thank you Dr. Pratik Jay MD for the opportunity to be involved in this patients care.

## 2019-04-27 NOTE — PLAN OF CARE
Problem: Pain:  Goal: Pain level will decrease  Description  Pain level will decrease  4/26/2019 2046 by Andrew Fish RN  Outcome: Ongoing  Note:   Medicated for c/o chronic back pain

## 2019-04-27 NOTE — PROGRESS NOTES
Returning from [facility], VSS except heart rate >100 after activity, O2 sat initially <90% with lowest registered @87%. Heart rate down to 98 and O2 sat came up to 94% after rest, assessment as charted. Watching TV, denies needs @present.

## 2019-04-28 ENCOUNTER — CARE COORDINATION (OUTPATIENT)
Dept: CASE MANAGEMENT | Age: 74
End: 2019-04-28

## 2019-04-28 NOTE — CARE COORDINATION
Iv[facility] 45 Transitions Initial Follow Up Call    Call within 2 business days of discharge: Yes    Patient: Bipin Lemons Patient : 1945   MRN: <[id number]>  Reason for Admission: Ileus   Discharge Date: 19 RARS: Readmission Risk Score: 44      Last Discharge [facility]       Complaint Diagnosis Description Type Department Provider    19 Back Pain Acute midline thoracic back pain . .. ED to Hosp-Admission (Discharged) (ADMITTED) Michelle Hawkins MD; Chani Portillo. .. Spoke with: Spouse    Facility: [address] [place] [address] Transitions 24 Hour Call    Schedule Follow Up Appointment with PCP:  Completed  Do you have any ongoing symptoms?:  No  Do you have a copy of your discharge instructions?:  Yes  Do you have all of your prescriptions and are they filled?:  Yes  Have you been contacted by a [address]?:  No  Have you scheduled your follow up appointment?:  Yes  How are you going to get to your appointment?:  Car - family or friend to transport  Were you discharged with any Home Care or Post Acute Services:  No  Patient DME:  Shower chair, Straight cane, Walker  Do you have support at home?:  Partner/Spouse/SO  Do you feel like you have everything you need to keep you well at home?:  Yes  Are you an active caregiver in your home?:  No  Care Transitions Interventions         Follow Up: Spoke w/ Rei Constantino, Spouse as Patient in shower. Reports patient doing well, minimal pain. No BM last hs or today; + flatus. No c/o nausea. Received copy of AVS; Picked up and taking new rx as directed. Reviewed new and d/c rx; no questions. Denies issue w/ cost of rx copays. 1111F/Med Rec deferred as Spouse unaware of home rx. Confirmed f/u appts below; has transportation. Denies resource needs including HHC. Encouraged to reach out to PCP  re: concerns, change in condition. Agreeable. Note routed to Joao Keenan RN, CTC.    Future Appointments   Date Time Provider Tawana Young   5/14/2019 10:30 AM TAWANA GOODRICH [facility], MD [facility]   5/15/2019 10:30 AM Yessica ACOSTA FF [facility]   6/7/2019  7:30 AM Cassy Shearer MD [facility]   6/18/2019  7:45 AM SCHEDULE, [place] PHONE TRANSMISSION FF [facility]       Adelaida Summers RN

## 2019-05-02 ENCOUNTER — CARE COORDINATION (OUTPATIENT)
Dept: CASE MANAGEMENT | Age: 74
End: 2019-05-02

## 2019-05-02 NOTE — CARE COORDINATION
Iván 45 Transitions Follow Up Call    2019    Patient: Sergo Molina  Patient : 1945   MRN: [id number]  Reason for Admission: back pain  Discharge Date: 19 RARS: Readmission Risk Score: 44         Spoke with: [facility] Transitions Subsequent and Final Call    Subsequent and Final Calls  Do you have any ongoing symptoms?:  No  Have your medications changed?:  No  Do you have any questions related to your medications?:  No  Do you currently have any active services?:  No  Do you have any needs or concerns that I can assist you with?:  No  Identified Barriers:  None  Care Transitions Interventions  No Identified Needs  Other Interventions:          Pt states doing well, no issues or concerns. Back pain well managed at this time. F/U appts listed below.  Agreed to more CTC f/u calls      Follow Up  Future Appointments   Date Time Provider Tawana Young   2019 10:30 AM Bogdan Menard MD [facility]   5/15/2019 10:30 AM Sarbjit ACOSTA FF [facility]   2019  7:30 AM Lor Bruner MD [facility]   2019  7:45 AM J CARLOS, [place] PHONE TRANSMISSION FF [facility]       Aquiles Oliva RN

## 2019-05-03 ENCOUNTER — ANTI-COAG VISIT (OUTPATIENT)
Dept: FAMILY MEDICINE CLINIC | Age: 74
End: 2019-05-03

## 2019-05-03 LAB — INR BLD: 1.8

## 2019-05-06 ENCOUNTER — TELEPHONE (OUTPATIENT)
Dept: PHARMACY | Facility: CLINIC | Age: 74
End: 2019-05-06

## 2019-05-06 NOTE — TELEPHONE ENCOUNTER
CLINICAL PHARMACY NOTE  Post-Discharge Transitions of Care (GUERA)    Attempted to reach patient for transitions of care follow-up after discharge from [facility] on 4/27/19. Patient politely declines medication review stating she just found out her first cousin passed away and she is in shock. Patient denies any questions or concerns with her medicines at this time and states \"everything is fine\".      Cach[place]ia Leticia, [address] [facility]  [phone] or [phone] (Option 7)    CLINICAL PHARMACY NOTE   POST-DISCHARGE TELEPHONE FOLLOW-UP ADDENDUM    For Pharmacy Admin Tracking Only    TCM Call Made?: No  [facility] Select Patient?: Yes  Total # of Interventions Recommended: 0  Total # Interventions Accepted: 0  Intervention Severity:   - Level 1 Intervention Present?: No   - Level 2 #: 0   - Level 3 #: 0  Outreach Status: Patient Refused  Care Coordinator Outreach to Patient?: Yes  Provider Contacted?: No  Waiting on response from: N/A  Time Spent (min): 5    Additional Documentation:

## 2019-05-09 ENCOUNTER — CARE COORDINATION (OUTPATIENT)
Dept: CASE MANAGEMENT | Age: 74
End: 2019-05-09

## 2019-05-09 LAB — INR BLD: 1.9

## 2019-05-09 NOTE — CARE COORDINATION
Iván 45 Transitions Follow Up Call    2019    Patient: Wendi Gustafson  Patient : 1945   MRN: [id number]  Reason for Admission: back pain  Discharge Date: 19 RARS: Readmission Risk Score: 39    Follow up call attempted, left contact info on       Follow Up  Future Appointments   Date Time Provider Tawana Young   2019 10:30 AM Reginald Nageotte PACERS  [facility]   2019 10:30 AM Annie Moreira MD  [facility]   2019  7:30 AM Rohini Simmons MD [facility]   2019  7:45 AM SCHEDULE, [place] PHONE TRANSMISSION  [facility]       Soco Scott RN

## 2019-05-11 ENCOUNTER — ANTI-COAG VISIT (OUTPATIENT)
Dept: FAMILY MEDICINE CLINIC | Age: 74
End: 2019-05-11

## 2019-05-13 RX ORDER — POTASSIUM CHLORIDE 750 MG/1
TABLET, EXTENDED RELEASE ORAL
Qty: 90 TABLET | Refills: 0 | Status: SHIPPED | OUTPATIENT
Start: 2019-05-13 | End: 2019-07-25 | Stop reason: SDUPTHER

## 2019-05-13 RX ORDER — SPIRONOLACTONE 25 MG/1
TABLET ORAL
Qty: 90 TABLET | Refills: 0 | Status: SHIPPED | OUTPATIENT
Start: 2019-05-13 | End: 2019-07-25 | Stop reason: SDUPTHER

## 2019-05-14 ENCOUNTER — PROCEDURE VISIT (OUTPATIENT)
Dept: CARDIOLOGY CLINIC | Age: 74
End: 2019-05-14
Payer: MEDICARE

## 2019-05-14 ENCOUNTER — OFFICE VISIT (OUTPATIENT)
Dept: CARDIOLOGY CLINIC | Age: 74
End: 2019-05-14
Payer: MEDICARE

## 2019-05-14 ENCOUNTER — CARE COORDINATION (OUTPATIENT)
Dept: CASE MANAGEMENT | Age: 74
End: 2019-05-14

## 2019-05-14 VITALS
BODY MASS INDEX: 31.22 KG/M2 | HEIGHT: 68 IN | DIASTOLIC BLOOD PRESSURE: 66 MMHG | RESPIRATION RATE: 14 BRPM | WEIGHT: 206 LBS | SYSTOLIC BLOOD PRESSURE: 97 MMHG | HEART RATE: 84 BPM

## 2019-05-14 DIAGNOSIS — I48.0 PAF (PAROXYSMAL ATRIAL FIBRILLATION) (HCC): ICD-10-CM

## 2019-05-14 DIAGNOSIS — Z45.09 ENCOUNTER FOR ELECTRONIC ANALYSIS OF REVEAL EVENT RECORDER: ICD-10-CM

## 2019-05-14 DIAGNOSIS — I35.0 NONRHEUMATIC AORTIC VALVE STENOSIS: ICD-10-CM

## 2019-05-14 DIAGNOSIS — I15.9 SECONDARY HYPERTENSION: ICD-10-CM

## 2019-05-14 DIAGNOSIS — Z95.2 S/P MVR (MITRAL VALVE REPLACEMENT): ICD-10-CM

## 2019-05-14 DIAGNOSIS — I25.10 CORONARY ARTERY DISEASE INVOLVING NATIVE CORONARY ARTERY OF NATIVE HEART WITHOUT ANGINA PECTORIS: ICD-10-CM

## 2019-05-14 DIAGNOSIS — Z45.09 ENCOUNTER FOR LOOP RECORDER CHECK: ICD-10-CM

## 2019-05-14 DIAGNOSIS — E66.9 OBESITY, CLASS I, BMI 30-34.9: ICD-10-CM

## 2019-05-14 DIAGNOSIS — I48.0 PAF (PAROXYSMAL ATRIAL FIBRILLATION) (HCC): Primary | ICD-10-CM

## 2019-05-14 PROCEDURE — 99214 OFFICE O/P EST MOD 30 MIN: CPT | Performed by: INTERNAL MEDICINE

## 2019-05-14 PROCEDURE — 93291 INTERROG DEV EVAL SCRMS IP: CPT | Performed by: INTERNAL MEDICINE

## 2019-05-14 NOTE — CARE COORDINATION
Iván 45 Transitions Follow Up Call    2019    Patient: Paul De Jesus  Patient : 1945   MRN: [id number]  Reason for Admission:   Discharge Date: 19 RARS: Readmission Risk Score: 44         Spoke with: 1500 [facility] Transitions Subsequent and Final Call    Subsequent and Final Calls  Do you have any ongoing symptoms?:  No  Have your medications changed?:  No  Do you have any questions related to your medications?:  No  Do you currently have any active services?:  No  Do you have any needs or concerns that I can assist you with?:  No  Identified Barriers:  None  Care Transitions Interventions  No Identified Needs  Other Interventions:          Pt states doing well, no issues or concerns. Saw Dr. Issac Morfin today, appt went well.  No need for further f/u CTC calls    Follow Up  Future Appointments   Date Time Provider Tawana Young   2019  7:30 AM Freddie Barber MD [facility]   2019  7:45 AM SCHEDULE, [place] PHONE TRANSMISSION FF Cardio [facility]   10/7/2019  7:30 AM ECHO ROOM [address] [place] [address] ECHO None       Lyndsey Martin, NAZANIN

## 2019-05-14 NOTE — PROGRESS NOTES
Patient comes in for interrogation of her implanted loop recorder. Interrogation shows recordings stating she is having AF. However everything appears to be sinus w ectopy. Pt will see Dr. Adelina Watson today.

## 2019-05-14 NOTE — PROGRESS NOTES
[facility]   Electrophysiology Follow up  Date: 5/14/2019  Consult Requesting Physician: Suella Ahumada, MD    Chief Complaint   Patient presents with    Palpitations    Atrial Fibrillation       HPI: Griselda Vázquez is a 68 y.o. history of dizziness and bradycardia. MCOT monitor showed PVCs and PACs with occasional episodes of VT. PMH HTN DM, HLD, PSVT, afib. She is on coumadin for anticoagulation. Also has history of DVT and PE. Episodes of afib associated with feeling skipped beats and weakness. She does not have any history of stroke or heart attack. She does not snore at night when she sleeps but she does have daytime fatigue. S/p ILR implantation 8/16/18    Cardiac cath with 3V CAD s/p CABG X3 with MAZE procedure with obliteration of WAYNE with atriclip by  8/25/18    She  was treated with clindamycin for left thigh infected hematoma 9/27/18. Her INR went up to 8. She developed  Rectal bleeding, had a questionable splenic infarct, while on coumadin. ( thought that  Dr Yang Noonan thought this was from calcium)    Amiodarone was discontinued 9/25/18    Interval History: Since last visit she hasn't had any episodes of syncope, palpitation, chest pain, or shortness of breath.   She continues taking warfarin for her history of DVT PE in the past.    Past Medical History:   Diagnosis Date    Asthma     Atrial fibrillation (HCC)     Eosinophilia     Hemoptysis     HIGH CHOLESTEROL     Hx of blood clots     Hypertension     Irregular heart beat     Other specified gastritis without mention of hemorrhage     Palpitations     Skin cancer     basal and squamous    Type II or unspecified type diabetes mellitus without mention of complication, not stated as uncontrolled         Past Surgical History:   Procedure Laterality Date    BRONCHOSCOPY  07/18/2016    Dr. Helena Last - brushings from [address] [place] [address],42-10  08/16/2018    Dr. James Lipscomb CHOLECYSTECTOMY      COLONOSCOPY  02/07/2017    Dr. Cayetano Hurd - sigmoid diverticulosis, polypectomies x3    COLONOSCOPY  01/17/2014    Dr. Cayetano Hurd - sigmoid diverticulosis, polypectomies x3, internal hemorrhoids    COLONOSCOPY  10/10/2018    w/biopsy performed by Reji Mcginnis MD at [address]  08/21/2018    Dr. Kamaljit De Santiago - x3 (LIMA-LAD, L SV-D1-PLV) modified BL MAZE procedure w/obliteration of WAYNE using 45mm AtriClip    HYSTERECTOMY      INSERTABLE CARDIAC MONITOR Left 08/16/2018    Dr. Brielle Noriega - Ester Boyceluis manuel [id number] [employer]    MITRAL VALVE REPLACEMENT  08/21/2018    Dr. Kamaljit De Santiago - 27mm [employer] Cinch tissue valve    SKIN BIOPSY      TRANSESOPHAGEAL ECHOCARDIOGRAM  08/21/2018    during CABG/MVR    TUNNELED VENOUS CATHETER PLACEMENT Left 08/23/2018    Dr. Cleveland Roberts for HD    UPPER GASTROINTESTINAL ENDOSCOPY N/A 10/10/2018    w/biopsy performed by Reji Mcginnis MD at [address] [place] [address]   Allergen Reactions    Qnqedivx-Ovvksty-Nzyryu [Fluocinolone] Shortness Of Breath    Ciprofloxacin Shortness Of Breath    Diovan [Valsartan] Shortness Of Breath    Flagyl [Metronidazole] Shortness Of Breath     Has taken diflucan at home 12/7/15    Metformin And Related [Metformin And Related] Shortness Of Breath    Benazepril      Other reaction(s): Not Recorded    Morphine      Bad reaction. \"makes her feel horrible\".  Saxagliptin      Other reaction(s): Not Recorded    Levaquin [Levofloxacin] Rash       Social History:   reports that she has never smoked. She has never used smokeless tobacco. She reports that she does not drink alcohol or use drugs. Family History:  family history includes Asthma in her mother; Cancer in her father; Heart Disease in her mother; High Blood Pressure in her mother; Hypertension in her mother. Review of System:  All other systems reviewed except for that noted above.  Pertinent negatives and positives are: General: negative for fever, chills   Ophthalmic ROS: negative for - eye pain or loss of vision  ENT ROS: negative for - headaches, sore throat   Respiratory: negative for - cough, sputum  Cardiovascular: Reviewed in HPI  Gastrointestinal: negative for - abdominal pain, diarrhea, N/V  Hematology: negative for - bleeding, blood clots, bruising or jaundice  Genito-Urinary:  negative for - Dysuria or incontinence  Musculoskeletal: negative for - Joint swelling, muscle pain  Neurological: negative for - confusion, dizziness, headaches   Psychiatric: No anxiety, no depression. Dermatological: negative for - rash    Physical Examination:  Vitals:    19 1028   BP: 97/66   Pulse: 84   Resp: 14      Wt Readings from Last 3 Encounters:   19 206 lb (93.4 kg)   19 209 lb 14.4 oz (95.2 kg)   19 200 lb (90.7 kg)       · Constitutional: Oriented. No distress. · Head: Normocephalic and atraumatic. · Mouth/Throat: Oropharynx is clear and moist.   · Eyes: Conjunctivae normal. EOM are normal.   · Neck: Neck supple. No rigidity. No JVD present. · Cardiovascular: Normal rate, regular rhythm, M4&V8.  + Systolic Murmur    · Pulmonary/Chest: Bilateral respiratory sounds. No wheezes, No rhonchi. · Abdominal: Soft. Bowel sounds present. No distension, No tenderness. · Musculoskeletal: No tenderness. No edema    · Lymphadenopathy: Has no cervical adenopathy. · Neurological: Alert and oriented. Cranial nerve appears intact, No Gross  deficit   · Skin: Skin is warm and dry. No rash noted. · Psychiatric: Has a normal behavior       Labs, diagnostic and imaging results reviewed. Reviewed.    Lab Results   Component Value Date    CREATININE 1.0 2019    CREATININE 0.9 2019    AST 12 2019    ALT 12 2019       EC2019 sinus rhythm, PVCs    Echo 10/3/18  Summary   -Moderate concentric left ventricular hypertrophy.   -Low normal global ejection fraction estimated at 50%.   -Apical akinesis noted.   -Left atrial enlargement based on volume index.   -Mild aortic stenosis with a peak velocity of 2.18m/s and a mean pressure   gradient of 12 mmHg. The aortic valve area is estimated at 1.22 cm. No   significant regurgitation noted.   -The bioprosthetic mitral valve is well seated with peak velocity of 2.8 m/s   and a mean gradient of 12 mmHg. No significant regurgitation noted.   -There is moderate tricuspid regurgitation with a RVSP estimation of 53   mmHg.   - Grade II diastolic dysfunction with elevated LV filling pressures.   -No obvious signs for thrombus utilizing optison imaging enhancing agent. Echo: 8/7/18  Summary   -Normal global systolic function with an ejection fraction estimated at 70%.  -No regional wall motion abnormalities noted.   -Moderate concentric left ventricular hypertrophy.   -Left atrial enlargement based on volume index.   -Mild aortic stenosis with a peak velocity of 2.48 m/s and a mean pressure   gradient of 14 mmHg. The aortic valve area is estimated at 1.12 cm^2. No   significant regurgitation noted.   -There is moderate mitral stenosis with a mean pressure gradient of 9 mmHg   and a valve area by pressure halftime estimated at 1.46 cm^2.    -There is mild mitral regurgitation.   -Severe mitral annular calcification.   -There is mild tricuspid regurgitation with a RVSP estimation of 35 mmHg.   -Diastolic filling parameters suggest grade I diastolic dysfunction.   L/W'=33.2    Stress:8/7/18      Summary    Small sized lateral completely reversible defect of mild intensity    consistent with ischemia in the territory of the LCx and/or LAD .    Normal LV function.    Overall findings represent a intermediate risk scan.         LHC: 8/16/18  Heavily calcified vessels  Mild AS-Normal LV FXN  90% mid LAD  90% prox Diag 1  90% mid Cx  Mild Dominant RCA     REC: CVTS opinion--Heavy Ca, DM, Chronic AC Rx---CABG vs LAD/Diag stenting-    Medication:  Current burden by ILR. S/p ILR implantation:   Device interrogated today Physician Attestation: I, Dr. Annie Moreira, confirm that the scribe's documentation has been prepared under my direction and personally reviewed by me in its entirety. I also confirm that the note above accurately reflects all work, treatment, procedures, and medical decision making performed by me. CAD:   S/p CABG:    Sable. Continue with current therapy. Aspirin 81 mg   Carvedilol 3.125 twice a day. HTN:  Controlled. Continue current medications. BP 97/66 (Site: Left Upper Arm, Position: Sitting, Cuff Size: Large Adult)   Pulse 84   Resp 14   Ht 5' 8\" (1.727 m)   Wt 206 lb (93.4 kg)   BMI 31.32 kg/m²     S/p MVR     Aortic stenosis:    Exam with systolic murmur left sternal border suggesting aortic stenosis. Last echocardiography reported mild aortic stenosis. Repeat echocardiogram in a year to assess for severity of aortic stenosis. - History of DVT PE   Patient has remained on anticoagulation with warfarin per his primary care for history of DVT PE. He has history of atrial fibrillation, however his left atrial appendage has been ligated so he can come off the anticoagulation if needed from A. fib standpoint. Obesity   Body mass index is 31.32 kg/m². -Thank you for allowing me to participate in the care of Wnedi Gustafson. Further evaluation will be based upon the patient's clinical course and testing results. All questions and concerns were addressed to the patient/family. Alternatives to my treatment were discussed. I have discussed the above stated plan and the patient verbalized understanding and agreed with the plan. NOTE: This report was transcribed using voice recognition software. Every effort was made to ensure accuracy, however, inadvertent computerized transcription errors may be present. Annie Moreira MD, MPH  [facility]   Office: [phone]     Scribe attestation:  This note was scribed in the presence of Glenn Riddle MD by Malvin Sofia RN    Physician Attestation: I, Dr. Glenn Riddle, confirm that the scribe's documentation has been prepared under my direction and personally reviewed by me in its entirety. I also confirm that the note above accurately reflects all work, treatment, procedures, and medical decision making performed by me.

## 2019-05-21 ENCOUNTER — TELEPHONE (OUTPATIENT)
Dept: FAMILY MEDICINE CLINIC | Age: 74
End: 2019-05-21

## 2019-05-21 NOTE — TELEPHONE ENCOUNTER
[facility] Records stated patient is a risk for readmission. She was inpatient at [facility] on 04/24/2019. Her diagnoses was abdominal pain.      Please Advise

## 2019-05-23 LAB — INR BLD: 1.7

## 2019-05-24 ENCOUNTER — ANTI-COAG VISIT (OUTPATIENT)
Dept: FAMILY MEDICINE CLINIC | Age: 74
End: 2019-05-24

## 2019-05-24 ENCOUNTER — TELEPHONE (OUTPATIENT)
Dept: FAMILY MEDICINE CLINIC | Age: 74
End: 2019-05-24

## 2019-05-30 RX ORDER — ROSUVASTATIN CALCIUM 20 MG/1
TABLET, COATED ORAL
Qty: 90 TABLET | Refills: 0 | Status: SHIPPED | OUTPATIENT
Start: 2019-05-30 | End: 2019-09-11 | Stop reason: SDUPTHER

## 2019-06-06 LAB — INR BLD: 2

## 2019-06-07 ENCOUNTER — OFFICE VISIT (OUTPATIENT)
Dept: FAMILY MEDICINE CLINIC | Age: 74
End: 2019-06-07
Payer: MEDICARE

## 2019-06-07 ENCOUNTER — ANTI-COAG VISIT (OUTPATIENT)
Dept: FAMILY MEDICINE CLINIC | Age: 74
End: 2019-06-07

## 2019-06-07 VITALS
SYSTOLIC BLOOD PRESSURE: 102 MMHG | RESPIRATION RATE: 16 BRPM | WEIGHT: 206 LBS | HEART RATE: 68 BPM | BODY MASS INDEX: 31.32 KG/M2 | DIASTOLIC BLOOD PRESSURE: 68 MMHG

## 2019-06-07 DIAGNOSIS — K52.81 EOSINOPHILIC GASTRITIS: ICD-10-CM

## 2019-06-07 DIAGNOSIS — I48.0 PAF (PAROXYSMAL ATRIAL FIBRILLATION) (HCC): ICD-10-CM

## 2019-06-07 DIAGNOSIS — E11.69 DIABETES MELLITUS TYPE 2 IN OBESE (HCC): Primary | ICD-10-CM

## 2019-06-07 DIAGNOSIS — M51.34 THORACIC DEGENERATIVE DISC DISEASE: ICD-10-CM

## 2019-06-07 DIAGNOSIS — E66.9 DIABETES MELLITUS TYPE 2 IN OBESE (HCC): Primary | ICD-10-CM

## 2019-06-07 DIAGNOSIS — Z86.711 HISTORY OF PULMONARY EMBOLISM: ICD-10-CM

## 2019-06-07 PROBLEM — Z86.79 S/P MAZE OPERATION FOR ATRIAL FIBRILLATION: Status: RESOLVED | Noted: 2018-08-22 | Resolved: 2019-06-07

## 2019-06-07 PROBLEM — R11.2 NAUSEA AND VOMITING: Status: RESOLVED | Noted: 2019-01-15 | Resolved: 2019-06-07

## 2019-06-07 PROBLEM — G93.40 ACUTE ENCEPHALOPATHY: Status: RESOLVED | Noted: 2018-11-06 | Resolved: 2019-06-07

## 2019-06-07 PROBLEM — R10.9 ABDOMINAL PAIN: Status: RESOLVED | Noted: 2019-04-24 | Resolved: 2019-06-07

## 2019-06-07 PROBLEM — I20.0 UNSTABLE ANGINA (HCC): Status: RESOLVED | Noted: 2018-08-16 | Resolved: 2019-06-07

## 2019-06-07 PROBLEM — C44.02 SQUAMOUS CELL CARCINOMA OF LIP: Status: RESOLVED | Noted: 2018-09-07 | Resolved: 2019-06-07

## 2019-06-07 PROBLEM — Z98.890 S/P MAZE OPERATION FOR ATRIAL FIBRILLATION: Status: RESOLVED | Noted: 2018-08-22 | Resolved: 2019-06-07

## 2019-06-07 PROCEDURE — 99214 OFFICE O/P EST MOD 30 MIN: CPT | Performed by: FAMILY MEDICINE

## 2019-06-07 RX ORDER — CARVEDILOL 3.12 MG/1
3.12 TABLET ORAL 2 TIMES DAILY WITH MEALS
Qty: 180 TABLET | Refills: 1 | Status: SHIPPED | OUTPATIENT
Start: 2019-06-07 | End: 2019-08-07

## 2019-06-07 RX ORDER — ASPIRIN 81 MG/1
81 TABLET, CHEWABLE ORAL DAILY
Qty: 30 TABLET | Refills: 3 | Status: SHIPPED | OUTPATIENT
Start: 2019-06-07

## 2019-06-07 RX ORDER — POLYETHYLENE GLYCOL 3350 17 G/17G
17 POWDER, FOR SOLUTION ORAL
Qty: 255 G | Refills: 3 | Status: SHIPPED | OUTPATIENT
Start: 2019-06-07 | End: 2019-07-07

## 2019-06-07 RX ORDER — DIGOXIN 125 MCG
125 TABLET ORAL DAILY
Qty: 90 TABLET | Refills: 1 | Status: SHIPPED | OUTPATIENT
Start: 2019-06-07 | End: 2019-08-07

## 2019-06-07 RX ORDER — DOCUSATE SODIUM 100 MG/1
100 CAPSULE, LIQUID FILLED ORAL DAILY
Qty: 60 CAPSULE | Refills: 1 | Status: SHIPPED
Start: 2019-06-07 | End: 2020-05-08 | Stop reason: ALTCHOICE

## 2019-06-07 NOTE — PROGRESS NOTES
Subjective:      Patient ID: Joe Hutson is a 68 y.o. female. CC: Patient presents for re-evaluation of chronic health problems including diabetes mellitus, eosinophilic gastritis, back pain secondary to degenerative disc disease, hypertension and SVT. HPI pt is here for a follow up, med refill, INR results. Patient has been reevaluated by cardiology and they do not think she is on anticoagulation. Patient has been on anticoagulation prior to the heart issue for recurrent pulmonary emboli. She is started have more issues with eosinophilic gastritis but so far has not required any prednisone. Her blood sugars at home typically range between 1 . She has been using short-acting insulin prior to meals. She's had no hypoglycemic episodes. Patient is concerned that she had severe back pain and was found have degenerative disc disease with a disc bulge at the T10 past T11 area. She is not doing back exercises. She has any issues with bowel or bladder. Eye exam current (within one year): Yes    Checks sugars at home: yes  Home blood sugar records: patient tests 4 time(s) per day  Any episodes of hypoglycemia?  No    Current medication use: taking as prescribed  Medication side effects: none     Current diet: in general, a \"healthy\" diet    Current exercise:not active    Review of Systems  Patient Active Problem List   Diagnosis    Long term current use of anticoagulant    Mixed hyperlipidemia    Essential hypertension    Generalized osteoarthrosis, involving multiple sites    Eosinophilic gastritis    [facility]ox[facility]ra[facility])    B12 deficiency    History of pulmonary embolism    Multiple pulmonary nodules    Diabetes mellitus type 2 in obese (HCC)    Asthma    PAF ([facility]oxysmal atrial fibrillation) ([facility] [address]    Hypertension    Cardiac arrhythmia    Encounter for loop recorder check    CAD (coronary artery disease)    S/P mitral valve replacement with bioprosthetic valve    Status post aorto-coronary artery bypass graft    Goiter    Primary osteoarthritis of both knees    Splenic infarct    Obesity, Class I, BMI 30-34.9    Acute on chronic combined systolic and diastolic congestive heart failure (HCC)    Intra-abdominal free air of unknown etiology    Chronic combined systolic (congestive) and diastolic (congestive) heart failure (HCC)    Thoracic degenerative disc disease       Outpatient Medications Marked as Taking for the 6/7/19 encounter (Office Visit) with Giovani Lindsay MD   Medication Sig Dispense Refill    blood glucose test strips (FREESTYLE LITE) strip 1 each by Does not apply route 4 times daily Patient to check blood sugar 4 times a day and PRN, she is treated with multiple daily injections of insulin that require correction dosing ;A1C 8.1 ; ICD code-E11.65 ,Z79.4 100 each 5    digoxin (LANOXIN) 125 MCG tablet Take 1 tablet by mouth daily 90 tablet 1    carvedilol (COREG) 3.125 MG tablet Take 1 tablet by mouth 2 times daily (with meals) 180 tablet 1    aspirin 81 MG chewable tablet Take 1 tablet by mouth daily 30 tablet 3    polyethylene glycol (GLYCOLAX) powder Take 17 g by mouth every 48 hours 255 g 3    docusate sodium (COLACE) 100 MG capsule Take 1 capsule by mouth daily 60 capsule 1    insulin glargine (LANTUS) 100 UNIT/ML injection pen Inject 60 Units into the skin every morning 30 pen 3    rosuvastatin (CRESTOR) 20 MG tablet TAKE 1 TABLET DAILY 90 tablet 0    potassium chloride (KLOR-CON M) 10 MEQ extended release tablet TAKE 1 TABLET DAILY 90 tablet 0    spironolactone (ALDACTONE) 25 MG tablet TAKE 1 TABLET DAILY 90 tablet 0    insulin lispro (HUMALOG) 100 UNIT/ML pen Inject 0-12 Units into the skin 3 times daily (with meals) **Medium Dose Corrective Algorithm**  Glucose: Dose:  If <139             No Insulin  140-199 2 Units  200-249 4 Units  250-299 6 Units  300-349 8 Units  350-400 10 Units  Above 400       12 glucose test strips (FREESTYLE LITE) strip; 1 each by Does not apply route 4 times daily Patient to check blood sugar 4 times a day and PRN, she is treated with multiple daily injections of insulin that require correction dosing ;A1C 8.1 ; ICD code-E11.65 ,Z79.4  -     Comprehensive Metabolic Panel - Vitros; Future  -     Hemoglobin A1C; Future  -     Lipid Panel; Future    Eosinophilic gastritis    History of pulmonary embolism    Thoracic degenerative disc disease    PAF (paroxysmal atrial fibrillation) (HCC)    Other orders  -     digoxin (LANOXIN) 125 MCG tablet; Take 1 tablet by mouth daily  -     carvedilol (COREG) 3.125 MG tablet; Take 1 tablet by mouth 2 times daily (with meals)  -     aspirin 81 MG chewable tablet; Take 1 tablet by mouth daily  -     polyethylene glycol (GLYCOLAX) powder; Take 17 g by mouth every 48 hours  -     docusate sodium (COLACE) 100 MG capsule; Take 1 capsule by mouth daily  -     insulin glargine (LANTUS) 100 UNIT/ML injection pen; Inject 60 Units into the skin every morning    OARRS report checked          Plan:      Pt appears stable & labs ordered. Adjustments of medication will be done after laboratory testing results available. Patient received counseling on the following healthy behaviors: nutrition and low back and thoracic back exercises reviewed with patient to be done twice daily     Patient given educational materials     Health maintenance updated    Discussed use, benefit, and side effects of prescribed medications. Barriers to medication compliance addressed. All patient questions answered. Pt voiced understanding. Patient needs RTC in 3 months. Please note that this chart was generated using Dragon dictation software. Although every effort was made to ensure the accuracy of this automated transcription, some errors in transcription may have occurred.

## 2019-06-07 NOTE — PATIENT INSTRUCTIONS
of you at the same time. Knee-to-chest exercise    5. Lie on your back with your knees bent and your feet flat on the floor. 6. Bring one knee to your chest, keeping the other foot flat on the floor (or keeping the other leg straight, whichever feels better on your lower back). 7. Keep your lower back pressed to the floor. Hold for at least 15 to 30 seconds. 8. Relax, and lower the knee to the starting position. 9. Repeat with the other leg. Repeat 2 to 4 times with each leg. 10. To get more stretch, put your other leg flat on the floor while pulling your knee to your chest.    Curl-ups    5. Lie on the floor on your back with your knees bent at a 90-degree angle. Your feet should be flat on the floor, about 12 inches from your buttocks. 6. Cross your arms over your chest. If this bothers your neck, try putting your hands behind your neck (not your head), with your elbows spread apart. 7. Slowly tighten your belly muscles and raise your shoulder blades off the floor. 8. Keep your head in line with your body, and do not press your chin to your chest.  9. Hold this position for 1 or 2 seconds, then slowly lower yourself back down to the floor. 10. Repeat 8 to 12 times. Pelvic tilt exercise    1. Lie on your back with your knees bent. 2. \"Brace\" your stomach. This means to tighten your muscles by pulling in and imagining your belly button moving toward your spine. You should feel like your back is pressing to the floor and your hips and pelvis are rocking back. 3. Hold for about 6 seconds while you breathe smoothly. 4. Repeat 8 to 12 times. Heel dig bridging    1. Lie on your back with both knees bent and your ankles bent so that only your heels are digging into the floor. Your knees should be bent about 90 degrees. 2. Then push your heels into the floor, squeeze your buttocks, and lift your hips off the floor until your shoulders, hips, and knees are all in a straight line.   3. Hold for about 6 seconds as you continue to breathe normally, and then slowly lower your hips back down to the floor and rest for up to 10 seconds. 4. Do 8 to 12 repetitions. Hamstring stretch in doorway    1. Lie on your back in a doorway, with one leg through the open door. 2. Slide your leg up the wall to straighten your knee. You should feel a gentle stretch down the back of your leg. 3. Hold the stretch for at least 15 to 30 seconds. Do not arch your back, point your toes, or bend either knee. Keep one heel touching the floor and the other heel touching the wall. 4. Repeat with your other leg. 5. Do 2 to 4 times for each leg. Hip flexor stretch    1. Kneel on the floor with one knee bent and one leg behind you. Place your forward knee over your foot. Keep your other knee touching the floor. 2. Slowly push your hips forward until you feel a stretch in the upper thigh of your rear leg. 3. Hold the stretch for at least 15 to 30 seconds. Repeat with your other leg. 4. Do 2 to 4 times on each side. Wall sit    1. Stand with your back 10 to 12 inches away from a wall. 2. Lean into the wall until your back is flat against it. 3. Slowly slide down until your knees are slightly bent, pressing your lower back into the wall. 4. Hold for about 6 seconds, then slide back up the wall. 5. Repeat 8 to 12 times. Follow-up care is a key part of your treatment and safety. Be sure to make and go to all appointments, and call your doctor if you are having problems. It's also a good idea to know your test results and keep a list of the medicines you take. Where can you learn more? Go to [email or web address] and sign in to your [employer] account. Enter H738 in the Instapio box to learn more about \"Low Back Pain: Exercises. \"     If you do not have an account, please click on the \"Sign Up Now\" link.   Current as of: September 20, 2018  Content Version: 12.0  © 7786-1915 [employer],

## 2019-06-11 ENCOUNTER — TELEPHONE (OUTPATIENT)
Dept: CARDIOLOGY CLINIC | Age: 74
End: 2019-06-11

## 2019-06-11 NOTE — TELEPHONE ENCOUNTER
Spoke with pt she has had this heart flopping feeling for a couple weeks now and some times it gets better and then worse. This morning it was really bad but it is getting better now. She does have sob. The last time BP and pulse was taken was Friday at her doctors apptmnt with Alfred.  Please advise DGB OOT

## 2019-06-11 NOTE — TELEPHONE ENCOUNTER
She will increase lanoxin to 0.25 mg daily and coreg 6.25 mg bid. Please have her seen in office-hope in next week with either NP or EP.  She has device interrogation on 6/18

## 2019-06-13 LAB
A/G RATIO: 1.4 (CALC) (ref 1–2.5)
ALBUMIN SERPL-MCNC: 3.4 G/DL (ref 3.6–5.1)
ALP BLD-CCNC: 97 U/L (ref 33–130)
ALT SERPL-CCNC: 9 U/L (ref 6–29)
AST SERPL-CCNC: 10 U/L (ref 10–35)
BILIRUB SERPL-MCNC: 0.4 MG/DL (ref 0.2–1.2)
BUN / CREAT RATIO: 15 (CALC) (ref 6–22)
BUN BLDV-MCNC: 16 MG/DL (ref 7–25)
CALCIUM SERPL-MCNC: 8.9 MG/DL (ref 8.6–10.4)
CHLORIDE BLD-SCNC: 106 MMOL/L (ref 98–110)
CHOLESTEROL, TOTAL: 131 MG/DL
CHOLESTEROL/HDL RATIO: 4 (CALC)
CHOLESTEROL: 98 MG/DL (CALC)
CO2: 28 MMOL/L (ref 20–32)
CREAT SERPL-MCNC: 1.05 MG/DL (ref 0.6–0.93)
GFR AFRICAN AMERICAN: 61 ML/MIN/1.73M2
GFR, ESTIMATED: 53 ML/MIN/1.73M2
GLOBULIN: 2.4 G/DL (CALC) (ref 1.9–3.7)
GLUCOSE BLD-MCNC: 134 MG/DL (ref 65–99)
HBA1C MFR BLD: 7.2 % OF TOTAL HGB
HDLC SERPL-MCNC: 33 MG/DL
LDL CHOLESTEROL CALCULATED: 71 MG/DL (CALC)
POTASSIUM SERPL-SCNC: 3.6 MMOL/L (ref 3.5–5.3)
SODIUM BLD-SCNC: 143 MMOL/L (ref 135–146)
TOTAL PROTEIN: 5.8 G/DL (ref 6.1–8.1)
TRIGL SERPL-MCNC: 202 MG/DL

## 2019-06-20 ENCOUNTER — TELEPHONE (OUTPATIENT)
Dept: FAMILY MEDICINE CLINIC | Age: 74
End: 2019-06-20

## 2019-06-20 LAB — INR BLD: 2

## 2019-06-20 NOTE — TELEPHONE ENCOUNTER
Patient is here for an INR which is 2.0. Patient needs to continue same dose and recheck in two weeks per Dr. Elyssa Miller.

## 2019-06-21 ENCOUNTER — ANTI-COAG VISIT (OUTPATIENT)
Dept: FAMILY MEDICINE CLINIC | Age: 74
End: 2019-06-21

## 2019-06-24 ENCOUNTER — TELEPHONE (OUTPATIENT)
Dept: CARDIOLOGY CLINIC | Age: 74
End: 2019-06-24

## 2019-06-24 NOTE — TELEPHONE ENCOUNTER
Spoke to the pt. She does have sob, arm weakness, fatigue. The Digoxin 125 mg was increased to 0.25 mg, along with an increase of Coreg, from 3.25 BID, to 6.25 BID, on 6/118/19. That helped for a time, but she is back to feeling bad.

## 2019-06-24 NOTE — TELEPHONE ENCOUNTER
Pt  calling pt has no energy, arms are weak has been going off and on for about 2-3 weeks now. Pt needs to know what to do?  Pls call to advise Thank you

## 2019-06-24 NOTE — TELEPHONE ENCOUNTER
Attempted to call to find out more information. Could not get a hold of patient. Is the patient having any sob with this? Any chest pain, jaw pain, or neck pain with this arm weakness? How much sleep does she get? Any other issues?

## 2019-07-01 ENCOUNTER — OFFICE VISIT (OUTPATIENT)
Dept: FAMILY MEDICINE CLINIC | Age: 74
End: 2019-07-01
Payer: MEDICARE

## 2019-07-01 VITALS
SYSTOLIC BLOOD PRESSURE: 102 MMHG | BODY MASS INDEX: 31.02 KG/M2 | HEART RATE: 81 BPM | TEMPERATURE: 96.4 F | OXYGEN SATURATION: 97 % | WEIGHT: 204 LBS | DIASTOLIC BLOOD PRESSURE: 78 MMHG

## 2019-07-01 DIAGNOSIS — J02.9 SORE THROAT: Primary | ICD-10-CM

## 2019-07-01 LAB — S PYO AG THROAT QL: NORMAL

## 2019-07-01 PROCEDURE — 87880 STREP A ASSAY W/OPTIC: CPT | Performed by: PHYSICIAN ASSISTANT

## 2019-07-01 PROCEDURE — 99213 OFFICE O/P EST LOW 20 MIN: CPT | Performed by: PHYSICIAN ASSISTANT

## 2019-07-01 RX ORDER — AMOXICILLIN 500 MG/1
500 CAPSULE ORAL 3 TIMES DAILY
Qty: 30 CAPSULE | Refills: 0 | Status: SHIPPED | OUTPATIENT
Start: 2019-07-01 | End: 2019-07-11

## 2019-07-01 ASSESSMENT — ENCOUNTER SYMPTOMS
NAUSEA: 0
WHEEZING: 0
EYE PAIN: 0
RHINORRHEA: 0
COUGH: 0
DIARRHEA: 0
SORE THROAT: 1
VOMITING: 0

## 2019-07-04 LAB — INR BLD: 1.9

## 2019-07-09 ENCOUNTER — ANTI-COAG VISIT (OUTPATIENT)
Dept: FAMILY MEDICINE CLINIC | Age: 74
End: 2019-07-09

## 2019-07-09 ENCOUNTER — NURSE ONLY (OUTPATIENT)
Dept: CARDIOLOGY CLINIC | Age: 74
End: 2019-07-09
Payer: MEDICARE

## 2019-07-09 DIAGNOSIS — I48.0 PAROXYSMAL ATRIAL FIBRILLATION (HCC): ICD-10-CM

## 2019-07-09 DIAGNOSIS — Z45.09 ENCOUNTER FOR ELECTRONIC ANALYSIS OF REVEAL EVENT RECORDER: ICD-10-CM

## 2019-07-09 PROCEDURE — 93298 REM INTERROG DEV EVAL SCRMS: CPT | Performed by: INTERNAL MEDICINE

## 2019-07-09 PROCEDURE — 93299 PR REM INTERROG ICPMS/SCRMS <30 D TECH REVIEW: CPT | Performed by: INTERNAL MEDICINE

## 2019-07-09 NOTE — LETTER
[address] [employer] [phone]  [address] [facility] [phone]    Pacemaker/Defibrillator Clinic          07/09/19        Radha Melendez  [address] [address]        Dear Radha Melendez    This letter is to inform you that we received the transmission from your monitor at home that checks your pacemaker and/or defibrillator, or implanted heart monitor. Your report shows no abnormal rhythms. The next date your monitor will automatically transmit will be 9/10/19. Your device and monitor are wireless and most transmit cellularly, but please periodically check your monitor is still plugged in to the electrical outlet. If you still use the telephone land line to send please ensure the connection to the phone abel is secure. This will help to ensure successful automatic transmissions in the future. Also, the monitor needs to be close to you while sleeping at night. Please be aware that the remote device transmission sites are periodically monitored only during regular business hours during which simultaneous in-office device clinics are being run. If your transmission requires attention, we will contact you as soon as possible. Thank you.             [facility]

## 2019-07-11 DIAGNOSIS — E11.69 DIABETES MELLITUS TYPE 2 IN OBESE (HCC): Primary | ICD-10-CM

## 2019-07-11 DIAGNOSIS — E66.9 DIABETES MELLITUS TYPE 2 IN OBESE (HCC): Primary | ICD-10-CM

## 2019-07-11 RX ORDER — LANCETS 28 GAUGE
1 EACH MISCELLANEOUS 4 TIMES DAILY
Qty: 200 EACH | Refills: 5 | Status: SHIPPED | OUTPATIENT
Start: 2019-07-11 | End: 2019-07-12 | Stop reason: SDUPTHER

## 2019-07-12 DIAGNOSIS — E66.9 DIABETES MELLITUS TYPE 2 IN OBESE (HCC): ICD-10-CM

## 2019-07-12 DIAGNOSIS — E11.69 DIABETES MELLITUS TYPE 2 IN OBESE (HCC): ICD-10-CM

## 2019-07-12 RX ORDER — LANCETS 28 GAUGE
1 EACH MISCELLANEOUS 4 TIMES DAILY
Qty: 200 EACH | Refills: 5 | Status: SHIPPED | OUTPATIENT
Start: 2019-07-12 | End: 2020-04-29 | Stop reason: SDUPTHER

## 2019-07-16 ENCOUNTER — TELEPHONE (OUTPATIENT)
Dept: FAMILY MEDICINE CLINIC | Age: 74
End: 2019-07-16

## 2019-07-18 ENCOUNTER — TELEPHONE (OUTPATIENT)
Dept: FAMILY MEDICINE CLINIC | Age: 74
End: 2019-07-18

## 2019-07-22 ENCOUNTER — ANTI-COAG VISIT (OUTPATIENT)
Dept: FAMILY MEDICINE CLINIC | Age: 74
End: 2019-07-22

## 2019-07-22 LAB — INR BLD: 2.3

## 2019-07-25 RX ORDER — TORSEMIDE 20 MG/1
TABLET ORAL
Qty: 90 TABLET | Refills: 1 | Status: SHIPPED | OUTPATIENT
Start: 2019-07-25 | End: 2019-12-19 | Stop reason: SDUPTHER

## 2019-07-25 RX ORDER — SPIRONOLACTONE 25 MG/1
TABLET ORAL
Qty: 90 TABLET | Refills: 3 | Status: SHIPPED | OUTPATIENT
Start: 2019-07-25 | End: 2020-05-22 | Stop reason: SDUPTHER

## 2019-07-25 RX ORDER — POTASSIUM CHLORIDE 750 MG/1
TABLET, EXTENDED RELEASE ORAL
Qty: 90 TABLET | Refills: 3 | Status: SHIPPED | OUTPATIENT
Start: 2019-07-25 | End: 2020-07-21

## 2019-08-06 ENCOUNTER — ANTI-COAG VISIT (OUTPATIENT)
Dept: FAMILY MEDICINE CLINIC | Age: 74
End: 2019-08-06

## 2019-08-06 LAB — INR BLD: 3

## 2019-08-07 ENCOUNTER — OFFICE VISIT (OUTPATIENT)
Dept: FAMILY MEDICINE CLINIC | Age: 74
End: 2019-08-07
Payer: MEDICARE

## 2019-08-07 VITALS
HEART RATE: 82 BPM | DIASTOLIC BLOOD PRESSURE: 68 MMHG | WEIGHT: 200.8 LBS | SYSTOLIC BLOOD PRESSURE: 122 MMHG | BODY MASS INDEX: 30.53 KG/M2 | OXYGEN SATURATION: 96 %

## 2019-08-07 DIAGNOSIS — Z79.4 TYPE 2 DIABETES MELLITUS WITH HYPERGLYCEMIA, WITH LONG-TERM CURRENT USE OF INSULIN (HCC): Primary | ICD-10-CM

## 2019-08-07 DIAGNOSIS — E11.65 TYPE 2 DIABETES MELLITUS WITH HYPERGLYCEMIA, WITH LONG-TERM CURRENT USE OF INSULIN (HCC): Primary | ICD-10-CM

## 2019-08-07 PROCEDURE — 99213 OFFICE O/P EST LOW 20 MIN: CPT | Performed by: FAMILY MEDICINE

## 2019-08-07 RX ORDER — DIGOXIN 250 MCG
250 TABLET ORAL DAILY
COMMUNITY
End: 2019-08-07 | Stop reason: SDUPTHER

## 2019-08-07 RX ORDER — DIGOXIN 250 MCG
250 TABLET ORAL DAILY
Qty: 90 TABLET | Refills: 1 | Status: ON HOLD | OUTPATIENT
Start: 2019-08-07 | End: 2019-10-31 | Stop reason: HOSPADM

## 2019-08-07 RX ORDER — CARVEDILOL 6.25 MG/1
6.25 TABLET ORAL 2 TIMES DAILY WITH MEALS
COMMUNITY
End: 2019-08-07 | Stop reason: SDUPTHER

## 2019-08-07 RX ORDER — CARVEDILOL 6.25 MG/1
6.25 TABLET ORAL 2 TIMES DAILY WITH MEALS
Qty: 180 TABLET | Refills: 1 | Status: SHIPPED | OUTPATIENT
Start: 2019-08-07 | End: 2019-12-19 | Stop reason: SDUPTHER

## 2019-08-07 NOTE — PROGRESS NOTES
(COREG) 6.25 MG tablet; Take 1 tablet by mouth 2 times daily (with meals)  -     Discontinue: insulin lispro (HUMALOG) 100 UNIT/ML pen; Inject 0-12 Units into the skin 3 times daily (with meals) Glucose: Dose:  If <139             No Insulin  140-199 2 Units  200-249 4 Units  250-299 6 Units  300-349 8 Units  350-400 10 Units  Above 400       12 Units  -     insulin glargine (LANTUS) 100 UNIT/ML injection pen; Inject 70 Units into the skin every morning  -     insulin lispro (HUMALOG) 100 UNIT/ML pen; Inject 0-12 Units into the skin 3 times daily (with meals) 140-199 2 U, 200-249 4 U, 250-299 6 U, 300-349 8 U, 350-400 10 U, > 400-12 Units            Plan:      Insulin was adjusted to 50 units in the morning and 20 units at nighttime. Hyperglycemia is probably secondary to the steroids and advised patient and  that once her blood sugars would start declining that she would need to reduce her insulin dosage back. Patient to call back in 1 week with an update    Please note that this chart was generated using Dragon dictation software. Although every effort was made to ensure the accuracy of this automated transcription, some errors in transcription may have occurred.             Ahsan Julian MA

## 2019-08-16 ENCOUNTER — ANTI-COAG VISIT (OUTPATIENT)
Dept: FAMILY MEDICINE CLINIC | Age: 74
End: 2019-08-16

## 2019-08-16 LAB — INR BLD: 2.8

## 2019-08-29 ENCOUNTER — HOSPITAL ENCOUNTER (EMERGENCY)
Age: 74
Discharge: HOME | End: 2019-08-29
Attending: EMERGENCY MEDICINE
Payer: MEDICARE

## 2019-08-29 ENCOUNTER — ANTI-COAG VISIT (OUTPATIENT)
Dept: FAMILY MEDICINE CLINIC | Age: 74
End: 2019-08-29

## 2019-08-29 ENCOUNTER — APPOINTMENT (OUTPATIENT)
Dept: GENERAL RADIOLOGY | Age: 74
End: 2019-08-29
Payer: MEDICARE

## 2019-08-29 VITALS
HEART RATE: 85 BPM | DIASTOLIC BLOOD PRESSURE: 71 MMHG | SYSTOLIC BLOOD PRESSURE: 127 MMHG | HEIGHT: 68 IN | WEIGHT: 201 LBS | BODY MASS INDEX: 30.46 KG/M2 | TEMPERATURE: 97 F | RESPIRATION RATE: 20 BRPM | OXYGEN SATURATION: 97 %

## 2019-08-29 DIAGNOSIS — M54.6 ACUTE RIGHT-SIDED THORACIC BACK PAIN: Primary | ICD-10-CM

## 2019-08-29 LAB
A/G RATIO: 1.1 (ref 1.1–2.2)
ALBUMIN SERPL-MCNC: 3.6 G/DL (ref 3.4–5)
ALP BLD-CCNC: 93 U/L (ref 40–129)
ALT SERPL-CCNC: 9 U/L (ref 10–40)
ANION GAP SERPL CALCULATED.3IONS-SCNC: 9 MMOL/L (ref 3–16)
AST SERPL-CCNC: 13 U/L (ref 15–37)
BASOPHILS ABSOLUTE: 0 K/UL (ref 0–0.2)
BASOPHILS RELATIVE PERCENT: 0.6 %
BILIRUB SERPL-MCNC: 0.3 MG/DL (ref 0–1)
BUN BLDV-MCNC: 19 MG/DL (ref 7–20)
CALCIUM SERPL-MCNC: 8.8 MG/DL (ref 8.3–10.6)
CHLORIDE BLD-SCNC: 103 MMOL/L (ref 99–110)
CO2: 27 MMOL/L (ref 21–32)
CREAT SERPL-MCNC: 1.1 MG/DL (ref 0.6–1.2)
D DIMER: <200 NG/ML DDU (ref 0–229)
EOSINOPHILS ABSOLUTE: 0.3 K/UL (ref 0–0.6)
EOSINOPHILS RELATIVE PERCENT: 4.2 %
GFR AFRICAN AMERICAN: 59
GFR NON-AFRICAN AMERICAN: 49
GLOBULIN: 3.3 G/DL
GLUCOSE BLD-MCNC: 216 MG/DL (ref 70–99)
HCT VFR BLD CALC: 37.6 % (ref 36–48)
HEMOGLOBIN: 12.4 G/DL (ref 12–16)
INR BLD: 2.15 (ref 0.86–1.14)
LYMPHOCYTES ABSOLUTE: 1.2 K/UL (ref 1–5.1)
LYMPHOCYTES RELATIVE PERCENT: 18.3 %
MCH RBC QN AUTO: 27.9 PG (ref 26–34)
MCHC RBC AUTO-ENTMCNC: 33 G/DL (ref 31–36)
MCV RBC AUTO: 84.6 FL (ref 80–100)
MONOCYTES ABSOLUTE: 0.5 K/UL (ref 0–1.3)
MONOCYTES RELATIVE PERCENT: 8.4 %
NEUTROPHILS ABSOLUTE: 4.5 K/UL (ref 1.7–7.7)
NEUTROPHILS RELATIVE PERCENT: 68.5 %
PDW BLD-RTO: 16.1 % (ref 12.4–15.4)
PLATELET # BLD: 200 K/UL (ref 135–450)
PMV BLD AUTO: 8.5 FL (ref 5–10.5)
POTASSIUM REFLEX MAGNESIUM: 4 MMOL/L (ref 3.5–5.1)
PRO-BNP: 1012 PG/ML (ref 0–124)
PROTHROMBIN TIME: 24.5 SEC (ref 9.8–13)
RBC # BLD: 4.44 M/UL (ref 4–5.2)
SODIUM BLD-SCNC: 139 MMOL/L (ref 136–145)
TOTAL PROTEIN: 6.9 G/DL (ref 6.4–8.2)
TROPONIN: <0.01 NG/ML
WBC # BLD: 6.5 K/UL (ref 4–11)

## 2019-08-29 PROCEDURE — 85379 FIBRIN DEGRADATION QUANT: CPT

## 2019-08-29 PROCEDURE — 83880 ASSAY OF NATRIURETIC PEPTIDE: CPT

## 2019-08-29 PROCEDURE — 96374 THER/PROPH/DIAG INJ IV PUSH: CPT

## 2019-08-29 PROCEDURE — 99283 EMERGENCY DEPT VISIT LOW MDM: CPT

## 2019-08-29 PROCEDURE — 71046 X-RAY EXAM CHEST 2 VIEWS: CPT

## 2019-08-29 PROCEDURE — 93005 ELECTROCARDIOGRAM TRACING: CPT | Performed by: EMERGENCY MEDICINE

## 2019-08-29 PROCEDURE — 85025 COMPLETE CBC W/AUTO DIFF WBC: CPT

## 2019-08-29 PROCEDURE — 6370000000 HC RX 637 (ALT 250 FOR IP): Performed by: EMERGENCY MEDICINE

## 2019-08-29 PROCEDURE — 6360000002 HC RX W HCPCS: Performed by: EMERGENCY MEDICINE

## 2019-08-29 PROCEDURE — 93010 ELECTROCARDIOGRAM REPORT: CPT | Performed by: INTERNAL MEDICINE

## 2019-08-29 PROCEDURE — 84484 ASSAY OF TROPONIN QUANT: CPT

## 2019-08-29 PROCEDURE — 85610 PROTHROMBIN TIME: CPT

## 2019-08-29 PROCEDURE — 80053 COMPREHEN METABOLIC PANEL: CPT

## 2019-08-29 RX ORDER — HYDROCODONE BITARTRATE AND ACETAMINOPHEN 5; 325 MG/1; MG/1
2 TABLET ORAL ONCE
Status: COMPLETED | OUTPATIENT
Start: 2019-08-29 | End: 2019-08-29

## 2019-08-29 RX ORDER — HYDROCODONE BITARTRATE AND ACETAMINOPHEN 5; 325 MG/1; MG/1
1 TABLET ORAL EVERY 4 HOURS PRN
Qty: 18 TABLET | Refills: 0 | Status: SHIPPED | OUTPATIENT
Start: 2019-08-29 | End: 2019-09-01

## 2019-08-29 RX ORDER — KETOROLAC TROMETHAMINE 30 MG/ML
15 INJECTION, SOLUTION INTRAMUSCULAR; INTRAVENOUS ONCE
Status: COMPLETED | OUTPATIENT
Start: 2019-08-29 | End: 2019-08-29

## 2019-08-29 RX ADMIN — HYDROCODONE BITARTRATE AND ACETAMINOPHEN 2 TABLET: 5; 325 TABLET ORAL at 05:32

## 2019-08-29 RX ADMIN — KETOROLAC TROMETHAMINE 15 MG: 30 INJECTION, SOLUTION INTRAMUSCULAR at 04:32

## 2019-08-29 ASSESSMENT — PAIN SCALES - GENERAL
PAINLEVEL_OUTOF10: 10
PAINLEVEL_OUTOF10: 10
PAINLEVEL_OUTOF10: 8
PAINLEVEL_OUTOF10: 9
PAINLEVEL_OUTOF10: 8

## 2019-08-29 ASSESSMENT — PAIN DESCRIPTION - LOCATION: LOCATION: BACK

## 2019-08-29 ASSESSMENT — PAIN DESCRIPTION - PAIN TYPE: TYPE: ACUTE PAIN

## 2019-08-29 NOTE — ED PROVIDER NOTES
[address] [facility]  [facility]      Pt Name: Maria R Zaragoza  MRN: [id number]  [place] 1945  Date of evaluation: 8/29/2019  Provider: Krissy Rossi MD    CHIEF COMPLAINT       Chief Complaint   Patient presents with    Back Pain     c/o lower back pain x2 days that is now going up under her shoulder blade rates pain 10/10          HISTORY OF PRESENT ILLNESS   (Location/Symptom, Timing/Onset,Context/Setting, Quality, Duration, Modifying Factors, Severity)  Note limiting factors. Maria R Zaragoza is a 68 y.o. female who presents to the emergency department for right-sided back pain that is radiating up to the scapula. This is been going on for about 2 to 3 days. She has had symptoms in the past that were similar to this and she was told that she had free air. She said the cause of that was not normal.  She denies any shortness of breath, no chest pain, no worsening of the pain with movement or with respirations. She does not have any nausea vomiting or lightheadedness. No cough or fevers. She denies any gastrointestinal symptoms. No urinary complaints. Nursing notes were reviewed. REVIEW OF SYSTEMS    (2-9 systems for level 4, 10 or more for level 5)     Review of Systems    Positive and pertinent negative as per HPI. Except as noted above in the ROS, all other systems were reviewed and were negative.     PAST MEDICAL HISTORY     Past Medical History:   Diagnosis Date    Asthma     Atrial fibrillation (HCC)     Eosinophilia     Hemoptysis     HIGH CHOLESTEROL     Hx of blood clots     Hypertension     Irregular heart beat     Other specified gastritis without mention of hemorrhage     Palpitations     Skin cancer     basal and squamous    Type II or unspecified type diabetes mellitus without mention of complication, not stated as uncontrolled          SURGICALHISTORY       Past Surgical History:   Procedure Laterality Date    signed)  Attending Emergency Physician        Kim Stephens MD  08/29/19 [id number]

## 2019-09-03 LAB
EKG ATRIAL RATE: 78 BPM
EKG DIAGNOSIS: NORMAL
EKG P AXIS: 26 DEGREES
EKG P-R INTERVAL: 234 MS
EKG Q-T INTERVAL: 370 MS
EKG QRS DURATION: 88 MS
EKG QTC CALCULATION (BAZETT): 421 MS
EKG R AXIS: 61 DEGREES
EKG T AXIS: 66 DEGREES
EKG VENTRICULAR RATE: 78 BPM

## 2019-09-10 ENCOUNTER — NURSE ONLY (OUTPATIENT)
Dept: CARDIOLOGY CLINIC | Age: 74
End: 2019-09-10
Payer: MEDICARE

## 2019-09-10 DIAGNOSIS — R00.2 PALPITATION: ICD-10-CM

## 2019-09-10 DIAGNOSIS — Z45.09 ENCOUNTER FOR ELECTRONIC ANALYSIS OF REVEAL EVENT RECORDER: ICD-10-CM

## 2019-09-10 PROCEDURE — 93298 REM INTERROG DEV EVAL SCRMS: CPT | Performed by: INTERNAL MEDICINE

## 2019-09-10 PROCEDURE — 93299 PR REM INTERROG ICPMS/SCRMS <30 D TECH REVIEW: CPT | Performed by: INTERNAL MEDICINE

## 2019-09-11 ENCOUNTER — OFFICE VISIT (OUTPATIENT)
Dept: FAMILY MEDICINE CLINIC | Age: 74
End: 2019-09-11
Payer: MEDICARE

## 2019-09-11 VITALS
SYSTOLIC BLOOD PRESSURE: 120 MMHG | WEIGHT: 206.8 LBS | OXYGEN SATURATION: 96 % | HEART RATE: 82 BPM | DIASTOLIC BLOOD PRESSURE: 80 MMHG | BODY MASS INDEX: 31.44 KG/M2

## 2019-09-11 DIAGNOSIS — E11.65 TYPE 2 DIABETES MELLITUS WITH HYPERGLYCEMIA, WITH LONG-TERM CURRENT USE OF INSULIN (HCC): Primary | ICD-10-CM

## 2019-09-11 DIAGNOSIS — Z79.4 TYPE 2 DIABETES MELLITUS WITH HYPERGLYCEMIA, WITH LONG-TERM CURRENT USE OF INSULIN (HCC): Primary | ICD-10-CM

## 2019-09-11 DIAGNOSIS — I25.10 CORONARY ARTERY DISEASE INVOLVING NATIVE CORONARY ARTERY OF NATIVE HEART WITHOUT ANGINA PECTORIS: ICD-10-CM

## 2019-09-11 DIAGNOSIS — M51.34 THORACIC DEGENERATIVE DISC DISEASE: ICD-10-CM

## 2019-09-11 DIAGNOSIS — K52.81 EOSINOPHILIC GASTRITIS: ICD-10-CM

## 2019-09-11 DIAGNOSIS — I50.43 ACUTE ON CHRONIC COMBINED SYSTOLIC AND DIASTOLIC CONGESTIVE HEART FAILURE (HCC): ICD-10-CM

## 2019-09-11 PROBLEM — K66.8 INTRA-ABDOMINAL FREE AIR OF UNKNOWN ETIOLOGY: Status: RESOLVED | Noted: 2019-01-14 | Resolved: 2019-09-11

## 2019-09-11 PROBLEM — Z95.1 STATUS POST AORTO-CORONARY ARTERY BYPASS GRAFT: Status: RESOLVED | Noted: 2018-08-22 | Resolved: 2019-09-11

## 2019-09-11 PROCEDURE — 99214 OFFICE O/P EST MOD 30 MIN: CPT | Performed by: FAMILY MEDICINE

## 2019-09-11 RX ORDER — ROSUVASTATIN CALCIUM 20 MG/1
TABLET, COATED ORAL
Qty: 90 TABLET | Refills: 4 | OUTPATIENT
Start: 2019-09-11

## 2019-09-11 RX ORDER — ROSUVASTATIN CALCIUM 20 MG/1
TABLET, COATED ORAL
Qty: 90 TABLET | Refills: 3 | Status: SHIPPED | OUTPATIENT
Start: 2019-09-11 | End: 2020-07-21

## 2019-09-11 NOTE — PROGRESS NOTES
Subjective:      Patient ID: Cinthia Hinds is a 68 y.o. female. CC: Patient presents for re-evaluation of chronic health problems including diabetes mellitus, chronic diastolic heart failure, heart disease, eosinophilic gastritis, and thoracic back pain. HPI Patient presents today for a follow-up on chronic medications and medical conditions. Patient states her afternoon sugars are still running high in the 200's. Patient would like to have a lab order to get your labs done. Patient went to the emergency room several days ago with sudden onset of severe right flank pain that radiated up into her scapular area. She states that symptoms awoke her at nighttime and were quite severe. In the emergency room x-ray examination did not demonstrate any abnormalities other than degenerative disc disease. She was given pain medication and sent home. She still feeling some discomfort but not as severe. She was concerned that she has a prior history of intra-abdominal free air. Patient is status post cholecystectomy. She is concerned that her blood sugars typically in the morning are ranging high then do settle down to the day. Some of this I believe is exacerbated that she had a cortisone injection 3 weeks ago. Her blood sugars after cortisone injection went over 300. She has had no hypoglycemic episodes. Her diet has not varied significantly. Eosinophilic gastritis is controlled    Eye exam current (within one year): Yes    Checks sugars at home: yes  Home blood sugar records: patient tests 3 time(s) per day- average about 130-180 in the morning, lunch time 200's  Any episodes of hypoglycemia?  No    Current medication use: taking as prescribed  Medication side effects: none     Current diet: on average, 3 meals per day, working on  her diet  Current exercise:not active    Review of Systems     Patient Active Problem List   Diagnosis    Long term current use of anticoagulant    Mixed hyperlipidemia    blood glucose test strips (FREESTYLE LITE) strip 1 each by Does not apply route 4 times daily Patient to check blood sugar 4 times a day and PRN, she is treated with multiple daily injections of insulin that require correction dosing ;A1C 8.1 ; ICD code-E11.65 ,Z79.4 100 each 5    aspirin 81 MG chewable tablet Take 1 tablet by mouth daily 30 tablet 3    docusate sodium (COLACE) 100 MG capsule Take 1 capsule by mouth daily 60 capsule 1    rosuvastatin (CRESTOR) 20 MG tablet TAKE 1 TABLET DAILY 90 tablet 0    terbinafine (LAMISIL) 250 MG tablet TAKE 1 TABLET DAILY 90 tablet 0    warfarin (COUMADIN) 5 MG tablet Take 1 tablet by mouth daily 100 tablet 3    montelukast (SINGULAIR) 10 MG tablet TAKE 1 TABLET NIGHTLY 90 tablet 0    BD PEN NEEDLE JANNET U/F 32G X 4 MM MISC USE 1 PEN NEEDLE FOUR TIMES A  each 3    exenatide (BYETTA 10 MCG PEN) 10 MCG/0.04ML injection Inject 0.04 mLs into the skin 2 times daily (with meals) 3 pen 3    omeprazole (PRILOSEC) 40 MG delayed release capsule Take 1 capsule by mouth every morning (before breakfast) (Patient taking differently: Take 40 mg by mouth daily as needed ) 30 capsule 5    ondansetron (ZOFRAN) 4 MG tablet Take 1 tablet by mouth 3 times daily as needed for Nausea or Vomiting 30 tablet 0    vitamin B-12 500 MCG tablet Take 1 tablet by mouth daily 30 tablet 3    Blood Glucose Monitoring Suppl (FREESTYLE LITE) GAETANO 1 Device by Does not apply route 4 times daily Patient to check blood sugar 4 times a day and PRN, she is treated with multiple daily injections of insulin that require correction dosing ;A1C 8.1 ; ICD code-E11.65 ,Z79.4 (Patient taking differently: 1 Device by Does not apply route 2 times daily ) 1 Device 0    nystatin (MYCOSTATIN) 355903 UNIT/ML suspension 1 teaspoon 4 times daily x 5 d 120 mL 5    albuterol sulfate HFA (PROAIR HFA) 108 (90 BASE) MCG/ACT inhaler Inhale 2 puffs into the lungs every 6 hours as needed for Wheezing 3 Inhaler 3

## 2019-09-13 LAB — HBA1C MFR BLD: 8 % OF TOTAL HGB

## 2019-09-17 ENCOUNTER — OFFICE VISIT (OUTPATIENT)
Dept: CARDIOLOGY CLINIC | Age: 74
End: 2019-09-17
Payer: MEDICARE

## 2019-09-17 ENCOUNTER — NURSE ONLY (OUTPATIENT)
Dept: CARDIOLOGY CLINIC | Age: 74
End: 2019-09-17
Payer: MEDICARE

## 2019-09-17 VITALS
WEIGHT: 207 LBS | HEIGHT: 72 IN | DIASTOLIC BLOOD PRESSURE: 81 MMHG | HEART RATE: 87 BPM | SYSTOLIC BLOOD PRESSURE: 118 MMHG | BODY MASS INDEX: 28.04 KG/M2

## 2019-09-17 DIAGNOSIS — I48.0 PAROXYSMAL ATRIAL FIBRILLATION (HCC): Primary | ICD-10-CM

## 2019-09-17 DIAGNOSIS — I48.0 PAF (PAROXYSMAL ATRIAL FIBRILLATION) (HCC): ICD-10-CM

## 2019-09-17 DIAGNOSIS — Z45.09 ENCOUNTER FOR ELECTRONIC ANALYSIS OF REVEAL EVENT RECORDER: ICD-10-CM

## 2019-09-17 PROCEDURE — 93000 ELECTROCARDIOGRAM COMPLETE: CPT | Performed by: INTERNAL MEDICINE

## 2019-09-17 PROCEDURE — 99214 OFFICE O/P EST MOD 30 MIN: CPT | Performed by: INTERNAL MEDICINE

## 2019-09-17 PROCEDURE — 93291 INTERROG DEV EVAL SCRMS IP: CPT | Performed by: INTERNAL MEDICINE

## 2019-09-20 ENCOUNTER — HOSPITAL ENCOUNTER (OUTPATIENT)
Dept: NON INVASIVE DIAGNOSTICS | Age: 74
Discharge: HOME | End: 2019-09-20
Payer: MEDICARE

## 2019-09-20 DIAGNOSIS — I48.0 PAF (PAROXYSMAL ATRIAL FIBRILLATION) (HCC): ICD-10-CM

## 2019-09-20 DIAGNOSIS — I35.0 NONRHEUMATIC AORTIC VALVE STENOSIS: ICD-10-CM

## 2019-09-20 LAB
LEFT VENTRICULAR EJECTION FRACTION HIGH VALUE: 50 %
LEFT VENTRICULAR EJECTION FRACTION MODE: NORMAL
LV EF: 45 %
LV EF: 48 %
LVEF MODALITY: NORMAL

## 2019-09-20 PROCEDURE — 93306 TTE W/DOPPLER COMPLETE: CPT

## 2019-09-25 ENCOUNTER — TELEPHONE (OUTPATIENT)
Dept: CARDIOLOGY CLINIC | Age: 74
End: 2019-09-25

## 2019-09-25 NOTE — TELEPHONE ENCOUNTER
Call transferred spoke with spouse Mr Radhika Pinon he is requesting the final findings on her echo. Please advise for he is very impatient and agitated about the findings.  Thanks

## 2019-09-27 ENCOUNTER — ANTI-COAG VISIT (OUTPATIENT)
Dept: FAMILY MEDICINE CLINIC | Age: 74
End: 2019-09-27

## 2019-09-27 ENCOUNTER — TELEPHONE (OUTPATIENT)
Dept: FAMILY MEDICINE CLINIC | Age: 74
End: 2019-09-27

## 2019-09-27 LAB — INR BLD: 2.1

## 2019-10-10 ENCOUNTER — ANTI-COAG VISIT (OUTPATIENT)
Dept: FAMILY MEDICINE CLINIC | Age: 74
End: 2019-10-10

## 2019-10-10 ENCOUNTER — TELEPHONE (OUTPATIENT)
Dept: FAMILY MEDICINE CLINIC | Age: 74
End: 2019-10-10

## 2019-10-10 LAB — INR BLD: 2.7

## 2019-10-15 ENCOUNTER — NURSE ONLY (OUTPATIENT)
Dept: CARDIOLOGY CLINIC | Age: 74
End: 2019-10-15
Payer: MEDICARE

## 2019-10-15 DIAGNOSIS — Z45.09 ENCOUNTER FOR ELECTRONIC ANALYSIS OF REVEAL EVENT RECORDER: ICD-10-CM

## 2019-10-15 DIAGNOSIS — I48.0 PAF (PAROXYSMAL ATRIAL FIBRILLATION) (HCC): ICD-10-CM

## 2019-10-15 PROCEDURE — 93299 PR REM INTERROG ICPMS/SCRMS <30 D TECH REVIEW: CPT | Performed by: INTERNAL MEDICINE

## 2019-10-15 PROCEDURE — 93298 REM INTERROG DEV EVAL SCRMS: CPT | Performed by: INTERNAL MEDICINE

## 2019-10-25 ENCOUNTER — ANTI-COAG VISIT (OUTPATIENT)
Dept: FAMILY MEDICINE CLINIC | Age: 74
End: 2019-10-25

## 2019-10-25 LAB — INR BLD: 3

## 2019-10-30 ENCOUNTER — ANESTHESIA (OUTPATIENT)
Dept: CARDIAC CATH/INVASIVE PROCEDURES | Age: 74
End: 2019-10-30
Payer: MEDICARE

## 2019-10-30 ENCOUNTER — ANESTHESIA EVENT (OUTPATIENT)
Dept: CARDIAC CATH/INVASIVE PROCEDURES | Age: 74
End: 2019-10-30
Payer: MEDICARE

## 2019-10-30 ENCOUNTER — HOSPITAL ENCOUNTER (OUTPATIENT)
Dept: CARDIAC CATH/INVASIVE PROCEDURES | Age: 74
Discharge: HOME | End: 2019-10-31
Attending: INTERNAL MEDICINE | Admitting: INTERNAL MEDICINE
Payer: MEDICARE

## 2019-10-30 VITALS
DIASTOLIC BLOOD PRESSURE: 68 MMHG | SYSTOLIC BLOOD PRESSURE: 126 MMHG | RESPIRATION RATE: 15 BRPM | TEMPERATURE: 99.1 F | OXYGEN SATURATION: 98 %

## 2019-10-30 PROBLEM — I48.19 PERSISTENT ATRIAL FIBRILLATION (HCC): Status: ACTIVE | Noted: 2019-10-30

## 2019-10-30 LAB
A/G RATIO: 1.1 (ref 1.1–2.2)
ABO/RH: NORMAL
ALBUMIN SERPL-MCNC: 3.6 G/DL (ref 3.4–5)
ALP BLD-CCNC: 98 U/L (ref 40–129)
ALT SERPL-CCNC: 9 U/L (ref 10–40)
ANION GAP SERPL CALCULATED.3IONS-SCNC: 12 MMOL/L (ref 3–16)
ANTIBODY SCREEN: NORMAL
AST SERPL-CCNC: 13 U/L (ref 15–37)
BILIRUB SERPL-MCNC: 0.6 MG/DL (ref 0–1)
BUN BLDV-MCNC: 21 MG/DL (ref 7–20)
CALCIUM SERPL-MCNC: 9.2 MG/DL (ref 8.3–10.6)
CHLORIDE BLD-SCNC: 104 MMOL/L (ref 99–110)
CO2: 26 MMOL/L (ref 21–32)
CREAT SERPL-MCNC: 1.2 MG/DL (ref 0.6–1.2)
EKG ATRIAL RATE: 202 BPM
EKG DIAGNOSIS: NORMAL
EKG Q-T INTERVAL: 338 MS
EKG QRS DURATION: 86 MS
EKG QTC CALCULATION (BAZETT): 402 MS
EKG R AXIS: 67 DEGREES
EKG T AXIS: 48 DEGREES
EKG VENTRICULAR RATE: 85 BPM
GFR AFRICAN AMERICAN: 53
GFR NON-AFRICAN AMERICAN: 44
GLOBULIN: 3.4 G/DL
GLUCOSE BLD-MCNC: 118 MG/DL (ref 70–99)
GLUCOSE BLD-MCNC: 164 MG/DL (ref 70–99)
GLUCOSE BLD-MCNC: 188 MG/DL (ref 70–99)
HCT VFR BLD CALC: 35 % (ref 36–48)
HEMOGLOBIN: 11.6 G/DL (ref 12–16)
INR BLD: 2.52 (ref 0.86–1.14)
LV EF: 38 %
LVEF MODALITY: NORMAL
MAGNESIUM: 2.2 MG/DL (ref 1.8–2.4)
MCH RBC QN AUTO: 27.8 PG (ref 26–34)
MCHC RBC AUTO-ENTMCNC: 33.3 G/DL (ref 31–36)
MCV RBC AUTO: 83.6 FL (ref 80–100)
PDW BLD-RTO: 16 % (ref 12.4–15.4)
PERFORMED ON: ABNORMAL
PERFORMED ON: ABNORMAL
PLATELET # BLD: 200 K/UL (ref 135–450)
PMV BLD AUTO: 9.2 FL (ref 5–10.5)
POTASSIUM SERPL-SCNC: 3.5 MMOL/L (ref 3.5–5.1)
PROTHROMBIN TIME: 28.7 SEC (ref 9.8–13)
RBC # BLD: 4.18 M/UL (ref 4–5.2)
SODIUM BLD-SCNC: 142 MMOL/L (ref 136–145)
TOTAL PROTEIN: 7 G/DL (ref 6.4–8.2)
WBC # BLD: 5.2 K/UL (ref 4–11)

## 2019-10-30 PROCEDURE — 94150 VITAL CAPACITY TEST: CPT

## 2019-10-30 PROCEDURE — 6360000002 HC RX W HCPCS

## 2019-10-30 PROCEDURE — 93312 ECHO TRANSESOPHAGEAL: CPT

## 2019-10-30 PROCEDURE — 2720000010 HC SURG SUPPLY STERILE

## 2019-10-30 PROCEDURE — 93623 PRGRMD STIMJ&PACG IV RX NFS: CPT

## 2019-10-30 PROCEDURE — 2580000003 HC RX 258

## 2019-10-30 PROCEDURE — 85610 PROTHROMBIN TIME: CPT

## 2019-10-30 PROCEDURE — 93623 PRGRMD STIMJ&PACG IV RX NFS: CPT | Performed by: INTERNAL MEDICINE

## 2019-10-30 PROCEDURE — 7100000001 HC PACU RECOVERY - ADDTL 15 MIN

## 2019-10-30 PROCEDURE — 3700000000 HC ANESTHESIA ATTENDED CARE

## 2019-10-30 PROCEDURE — 6360000002 HC RX W HCPCS: Performed by: NURSE ANESTHETIST, CERTIFIED REGISTERED

## 2019-10-30 PROCEDURE — 93655 ICAR CATH ABLTJ DSCRT ARRHYT: CPT | Performed by: INTERNAL MEDICINE

## 2019-10-30 PROCEDURE — 93320 DOPPLER ECHO COMPLETE: CPT

## 2019-10-30 PROCEDURE — 85027 COMPLETE CBC AUTOMATED: CPT

## 2019-10-30 PROCEDURE — C1769 GUIDE WIRE: HCPCS

## 2019-10-30 PROCEDURE — C1732 CATH, EP, DIAG/ABL, 3D/VECT: HCPCS

## 2019-10-30 PROCEDURE — 93656 COMPRE EP EVAL ABLTJ ATR FIB: CPT

## 2019-10-30 PROCEDURE — 2500000003 HC RX 250 WO HCPCS

## 2019-10-30 PROCEDURE — 93613 INTRACARDIAC EPHYS 3D MAPG: CPT

## 2019-10-30 PROCEDURE — 86901 BLOOD TYPING SEROLOGIC RH(D): CPT

## 2019-10-30 PROCEDURE — 80053 COMPREHEN METABOLIC PANEL: CPT

## 2019-10-30 PROCEDURE — 93662 INTRACARDIAC ECG (ICE): CPT

## 2019-10-30 PROCEDURE — 86900 BLOOD TYPING SEROLOGIC ABO: CPT

## 2019-10-30 PROCEDURE — 94640 AIRWAY INHALATION TREATMENT: CPT

## 2019-10-30 PROCEDURE — 93657 TX L/R ATRIAL FIB ADDL: CPT

## 2019-10-30 PROCEDURE — 93325 DOPPLER ECHO COLOR FLOW MAPG: CPT

## 2019-10-30 PROCEDURE — 93005 ELECTROCARDIOGRAM TRACING: CPT | Performed by: INTERNAL MEDICINE

## 2019-10-30 PROCEDURE — 93656 COMPRE EP EVAL ABLTJ ATR FIB: CPT | Performed by: INTERNAL MEDICINE

## 2019-10-30 PROCEDURE — 2580000003 HC RX 258: Performed by: INTERNAL MEDICINE

## 2019-10-30 PROCEDURE — 94760 N-INVAS EAR/PLS OXIMETRY 1: CPT

## 2019-10-30 PROCEDURE — C1894 INTRO/SHEATH, NON-LASER: HCPCS

## 2019-10-30 PROCEDURE — 6370000000 HC RX 637 (ALT 250 FOR IP): Performed by: INTERNAL MEDICINE

## 2019-10-30 PROCEDURE — 2500000003 HC RX 250 WO HCPCS: Performed by: NURSE ANESTHETIST, CERTIFIED REGISTERED

## 2019-10-30 PROCEDURE — 93655 ICAR CATH ABLTJ DSCRT ARRHYT: CPT

## 2019-10-30 PROCEDURE — C1759 CATH, INTRA ECHOCARDIOGRAPHY: HCPCS

## 2019-10-30 PROCEDURE — 83735 ASSAY OF MAGNESIUM: CPT

## 2019-10-30 PROCEDURE — 7100000000 HC PACU RECOVERY - FIRST 15 MIN

## 2019-10-30 PROCEDURE — 3700000001 HC ADD 15 MINUTES (ANESTHESIA)

## 2019-10-30 PROCEDURE — 6360000002 HC RX W HCPCS: Performed by: FAMILY MEDICINE

## 2019-10-30 PROCEDURE — 93662 INTRACARDIAC ECG (ICE): CPT | Performed by: INTERNAL MEDICINE

## 2019-10-30 PROCEDURE — 86850 RBC ANTIBODY SCREEN: CPT

## 2019-10-30 PROCEDURE — 36415 COLL VENOUS BLD VENIPUNCTURE: CPT

## 2019-10-30 PROCEDURE — 76937 US GUIDE VASCULAR ACCESS: CPT | Performed by: INTERNAL MEDICINE

## 2019-10-30 PROCEDURE — 93613 INTRACARDIAC EPHYS 3D MAPG: CPT | Performed by: INTERNAL MEDICINE

## 2019-10-30 PROCEDURE — 2580000003 HC RX 258: Performed by: NURSE ANESTHETIST, CERTIFIED REGISTERED

## 2019-10-30 PROCEDURE — 93010 ELECTROCARDIOGRAM REPORT: CPT | Performed by: INTERNAL MEDICINE

## 2019-10-30 PROCEDURE — 93657 TX L/R ATRIAL FIB ADDL: CPT | Performed by: INTERNAL MEDICINE

## 2019-10-30 RX ORDER — HYDROCODONE BITARTRATE AND ACETAMINOPHEN 5; 325 MG/1; MG/1
1 TABLET ORAL EVERY 4 HOURS PRN
Status: DISCONTINUED | OUTPATIENT
Start: 2019-10-30 | End: 2019-10-30

## 2019-10-30 RX ORDER — SPIRONOLACTONE 25 MG/1
25 TABLET ORAL DAILY
Status: DISCONTINUED | OUTPATIENT
Start: 2019-10-30 | End: 2019-10-31 | Stop reason: HOSPADM

## 2019-10-30 RX ORDER — SUCCINYLCHOLINE CHLORIDE 20 MG/ML
INJECTION INTRAMUSCULAR; INTRAVENOUS PRN
Status: DISCONTINUED | OUTPATIENT
Start: 2019-10-30 | End: 2019-10-30 | Stop reason: SDUPTHER

## 2019-10-30 RX ORDER — ALBUTEROL SULFATE 90 UG/1
2 AEROSOL, METERED RESPIRATORY (INHALATION) EVERY 6 HOURS PRN
Status: DISCONTINUED | OUTPATIENT
Start: 2019-10-30 | End: 2019-10-31 | Stop reason: HOSPADM

## 2019-10-30 RX ORDER — INSULIN LISPRO 100 [IU]/ML
0-12 INJECTION, SOLUTION INTRAVENOUS; SUBCUTANEOUS
Status: DISCONTINUED | OUTPATIENT
Start: 2019-10-30 | End: 2019-10-31 | Stop reason: HOSPADM

## 2019-10-30 RX ORDER — HEPARIN SODIUM 1000 [USP'U]/ML
INJECTION, SOLUTION INTRAVENOUS; SUBCUTANEOUS PRN
Status: DISCONTINUED | OUTPATIENT
Start: 2019-10-30 | End: 2019-10-30 | Stop reason: SDUPTHER

## 2019-10-30 RX ORDER — ONDANSETRON 2 MG/ML
INJECTION INTRAMUSCULAR; INTRAVENOUS PRN
Status: DISCONTINUED | OUTPATIENT
Start: 2019-10-30 | End: 2019-10-30 | Stop reason: SDUPTHER

## 2019-10-30 RX ORDER — HEPARIN SODIUM 200 [USP'U]/100ML
INJECTION, SOLUTION INTRAVENOUS CONTINUOUS PRN
Status: DISCONTINUED | OUTPATIENT
Start: 2019-10-30 | End: 2019-10-30 | Stop reason: SDUPTHER

## 2019-10-30 RX ORDER — OXYCODONE HYDROCHLORIDE 5 MG/1
5 TABLET ORAL EVERY 4 HOURS PRN
Status: DISCONTINUED | OUTPATIENT
Start: 2019-10-30 | End: 2019-10-31 | Stop reason: HOSPADM

## 2019-10-30 RX ORDER — DEXTROSE MONOHYDRATE 50 MG/ML
100 INJECTION, SOLUTION INTRAVENOUS PRN
Status: DISCONTINUED | OUTPATIENT
Start: 2019-10-30 | End: 2019-10-31 | Stop reason: HOSPADM

## 2019-10-30 RX ORDER — WARFARIN SODIUM 7.5 MG/1
7.5 TABLET ORAL
Status: DISCONTINUED | OUTPATIENT
Start: 2019-10-31 | End: 2019-10-31

## 2019-10-30 RX ORDER — CARVEDILOL 6.25 MG/1
6.25 TABLET ORAL 2 TIMES DAILY WITH MEALS
Status: DISCONTINUED | OUTPATIENT
Start: 2019-10-30 | End: 2019-10-31 | Stop reason: HOSPADM

## 2019-10-30 RX ORDER — FUROSEMIDE 10 MG/ML
INJECTION INTRAMUSCULAR; INTRAVENOUS PRN
Status: DISCONTINUED | OUTPATIENT
Start: 2019-10-30 | End: 2019-10-30 | Stop reason: SDUPTHER

## 2019-10-30 RX ORDER — SODIUM CHLORIDE 0.9 % (FLUSH) 0.9 %
10 SYRINGE (ML) INJECTION PRN
Status: DISCONTINUED | OUTPATIENT
Start: 2019-10-30 | End: 2019-10-31 | Stop reason: HOSPADM

## 2019-10-30 RX ORDER — LIDOCAINE HYDROCHLORIDE 20 MG/ML
INJECTION, SOLUTION EPIDURAL; INFILTRATION; INTRACAUDAL; PERINEURAL PRN
Status: DISCONTINUED | OUTPATIENT
Start: 2019-10-30 | End: 2019-10-30 | Stop reason: SDUPTHER

## 2019-10-30 RX ORDER — DOCUSATE SODIUM 100 MG/1
100 CAPSULE, LIQUID FILLED ORAL DAILY
Status: DISCONTINUED | OUTPATIENT
Start: 2019-10-30 | End: 2019-10-31 | Stop reason: HOSPADM

## 2019-10-30 RX ORDER — NICOTINE POLACRILEX 4 MG
15 LOZENGE BUCCAL PRN
Status: DISCONTINUED | OUTPATIENT
Start: 2019-10-30 | End: 2019-10-31 | Stop reason: HOSPADM

## 2019-10-30 RX ORDER — INSULIN LISPRO 100 [IU]/ML
0-12 INJECTION, SOLUTION INTRAVENOUS; SUBCUTANEOUS
Status: DISCONTINUED | OUTPATIENT
Start: 2019-10-30 | End: 2019-10-30

## 2019-10-30 RX ORDER — OXYCODONE HYDROCHLORIDE 5 MG/1
5 TABLET ORAL ONCE
Status: DISCONTINUED | OUTPATIENT
Start: 2019-10-30 | End: 2019-10-30

## 2019-10-30 RX ORDER — LABETALOL HYDROCHLORIDE 5 MG/ML
5 INJECTION, SOLUTION INTRAVENOUS EVERY 10 MIN PRN
Status: DISCONTINUED | OUTPATIENT
Start: 2019-10-30 | End: 2019-10-30

## 2019-10-30 RX ORDER — MEPERIDINE HYDROCHLORIDE 25 MG/ML
12.5 INJECTION INTRAMUSCULAR; INTRAVENOUS; SUBCUTANEOUS EVERY 5 MIN PRN
Status: DISCONTINUED | OUTPATIENT
Start: 2019-10-30 | End: 2019-10-30

## 2019-10-30 RX ORDER — SODIUM CHLORIDE 9 MG/ML
INJECTION, SOLUTION INTRAVENOUS CONTINUOUS PRN
Status: DISCONTINUED | OUTPATIENT
Start: 2019-10-30 | End: 2019-10-30 | Stop reason: SDUPTHER

## 2019-10-30 RX ORDER — WARFARIN SODIUM 5 MG/1
5 TABLET ORAL DAILY
Status: DISCONTINUED | OUTPATIENT
Start: 2019-10-30 | End: 2019-10-30 | Stop reason: SDUPTHER

## 2019-10-30 RX ORDER — MONTELUKAST SODIUM 10 MG/1
10 TABLET ORAL NIGHTLY
Status: DISCONTINUED | OUTPATIENT
Start: 2019-10-30 | End: 2019-10-31 | Stop reason: HOSPADM

## 2019-10-30 RX ORDER — ONDANSETRON 4 MG/1
4 TABLET, ORALLY DISINTEGRATING ORAL 3 TIMES DAILY PRN
Status: DISCONTINUED | OUTPATIENT
Start: 2019-10-30 | End: 2019-10-31 | Stop reason: HOSPADM

## 2019-10-30 RX ORDER — TORSEMIDE 20 MG/1
20 TABLET ORAL DAILY
Status: DISCONTINUED | OUTPATIENT
Start: 2019-10-30 | End: 2019-10-31 | Stop reason: HOSPADM

## 2019-10-30 RX ORDER — PROPOFOL 10 MG/ML
INJECTION, EMULSION INTRAVENOUS PRN
Status: DISCONTINUED | OUTPATIENT
Start: 2019-10-30 | End: 2019-10-30 | Stop reason: SDUPTHER

## 2019-10-30 RX ORDER — SODIUM CHLORIDE 0.9 % (FLUSH) 0.9 %
10 SYRINGE (ML) INJECTION EVERY 12 HOURS SCHEDULED
Status: DISCONTINUED | OUTPATIENT
Start: 2019-10-30 | End: 2019-10-31 | Stop reason: HOSPADM

## 2019-10-30 RX ORDER — HYDROMORPHONE HCL 110MG/55ML
PATIENT CONTROLLED ANALGESIA SYRINGE INTRAVENOUS
Status: DISCONTINUED
Start: 2019-10-30 | End: 2019-10-30 | Stop reason: WASHOUT

## 2019-10-30 RX ORDER — DIGOXIN 250 MCG
250 TABLET ORAL DAILY
Status: DISCONTINUED | OUTPATIENT
Start: 2019-10-30 | End: 2019-10-31 | Stop reason: HOSPADM

## 2019-10-30 RX ORDER — DIPHENHYDRAMINE HYDROCHLORIDE 50 MG/ML
12.5 INJECTION INTRAMUSCULAR; INTRAVENOUS
Status: ACTIVE | OUTPATIENT
Start: 2019-10-30 | End: 2019-10-30

## 2019-10-30 RX ORDER — ACETAMINOPHEN 325 MG/1
650 TABLET ORAL EVERY 4 HOURS PRN
Status: DISCONTINUED | OUTPATIENT
Start: 2019-10-30 | End: 2019-10-31 | Stop reason: HOSPADM

## 2019-10-30 RX ORDER — LANOLIN ALCOHOL/MO/W.PET/CERES
500 CREAM (GRAM) TOPICAL DAILY
Status: DISCONTINUED | OUTPATIENT
Start: 2019-10-30 | End: 2019-10-31 | Stop reason: HOSPADM

## 2019-10-30 RX ORDER — DEXAMETHASONE SODIUM PHOSPHATE 4 MG/ML
INJECTION, SOLUTION INTRA-ARTICULAR; INTRALESIONAL; INTRAMUSCULAR; INTRAVENOUS; SOFT TISSUE PRN
Status: DISCONTINUED | OUTPATIENT
Start: 2019-10-30 | End: 2019-10-30 | Stop reason: SDUPTHER

## 2019-10-30 RX ORDER — PROMETHAZINE HYDROCHLORIDE 25 MG/ML
6.25 INJECTION, SOLUTION INTRAMUSCULAR; INTRAVENOUS PRN
Status: DISCONTINUED | OUTPATIENT
Start: 2019-10-30 | End: 2019-10-31 | Stop reason: HOSPADM

## 2019-10-30 RX ORDER — POTASSIUM CHLORIDE 750 MG/1
10 TABLET, FILM COATED, EXTENDED RELEASE ORAL DAILY
Status: DISCONTINUED | OUTPATIENT
Start: 2019-10-30 | End: 2019-10-31 | Stop reason: HOSPADM

## 2019-10-30 RX ORDER — LANCETS 28 GAUGE
1 EACH MISCELLANEOUS 4 TIMES DAILY
Status: DISCONTINUED | OUTPATIENT
Start: 2019-10-30 | End: 2019-10-30

## 2019-10-30 RX ORDER — FENTANYL CITRATE 50 UG/ML
INJECTION, SOLUTION INTRAMUSCULAR; INTRAVENOUS PRN
Status: DISCONTINUED | OUTPATIENT
Start: 2019-10-30 | End: 2019-10-30 | Stop reason: SDUPTHER

## 2019-10-30 RX ORDER — ROSUVASTATIN CALCIUM 20 MG/1
20 TABLET, COATED ORAL DAILY
Status: DISCONTINUED | OUTPATIENT
Start: 2019-10-30 | End: 2019-10-31 | Stop reason: HOSPADM

## 2019-10-30 RX ORDER — DEXTROSE MONOHYDRATE 25 G/50ML
12.5 INJECTION, SOLUTION INTRAVENOUS PRN
Status: DISCONTINUED | OUTPATIENT
Start: 2019-10-30 | End: 2019-10-31 | Stop reason: HOSPADM

## 2019-10-30 RX ORDER — ASPIRIN 81 MG/1
81 TABLET, CHEWABLE ORAL DAILY
Status: DISCONTINUED | OUTPATIENT
Start: 2019-10-30 | End: 2019-10-31 | Stop reason: HOSPADM

## 2019-10-30 RX ORDER — FENTANYL CITRATE 50 UG/ML
50 INJECTION, SOLUTION INTRAMUSCULAR; INTRAVENOUS EVERY 5 MIN PRN
Status: DISCONTINUED | OUTPATIENT
Start: 2019-10-30 | End: 2019-10-30

## 2019-10-30 RX ORDER — WARFARIN SODIUM 5 MG/1
5 TABLET ORAL
Status: DISCONTINUED | OUTPATIENT
Start: 2019-10-30 | End: 2019-10-31

## 2019-10-30 RX ORDER — TRAMADOL HYDROCHLORIDE 50 MG/1
50 TABLET ORAL ONCE
Status: DISCONTINUED | OUTPATIENT
Start: 2019-10-30 | End: 2019-10-30

## 2019-10-30 RX ORDER — PANTOPRAZOLE SODIUM 40 MG/1
40 TABLET, DELAYED RELEASE ORAL
Status: DISCONTINUED | OUTPATIENT
Start: 2019-10-31 | End: 2019-10-31 | Stop reason: HOSPADM

## 2019-10-30 RX ORDER — ACETAMINOPHEN 325 MG/1
TABLET ORAL
Status: DISPENSED
Start: 2019-10-30 | End: 2019-10-31

## 2019-10-30 RX ADMIN — HEPARIN SODIUM 3000 UNITS: 1000 INJECTION INTRAVENOUS; SUBCUTANEOUS at 09:27

## 2019-10-30 RX ADMIN — FENTANYL CITRATE 50 MCG: 50 INJECTION INTRAMUSCULAR; INTRAVENOUS at 13:16

## 2019-10-30 RX ADMIN — DOCUSATE SODIUM 100 MG: 100 CAPSULE ORAL at 15:57

## 2019-10-30 RX ADMIN — HEPARIN SODIUM 10 ML/HR: 200 INJECTION, SOLUTION INTRAVENOUS at 10:06

## 2019-10-30 RX ADMIN — INSULIN LISPRO 2 UNITS: 100 INJECTION, SOLUTION INTRAVENOUS; SUBCUTANEOUS at 18:25

## 2019-10-30 RX ADMIN — FENTANYL CITRATE 50 MCG: 50 INJECTION INTRAMUSCULAR; INTRAVENOUS at 13:58

## 2019-10-30 RX ADMIN — PROPOFOL 150 MG: 10 INJECTION, EMULSION INTRAVENOUS at 08:41

## 2019-10-30 RX ADMIN — HEPARIN SODIUM 4000 UNITS: 1000 INJECTION INTRAVENOUS; SUBCUTANEOUS at 10:19

## 2019-10-30 RX ADMIN — SUCCINYLCHOLINE CHLORIDE 100 MG: 20 INJECTION, SOLUTION INTRAMUSCULAR; INTRAVENOUS at 08:41

## 2019-10-30 RX ADMIN — SODIUM CHLORIDE: 9 INJECTION, SOLUTION INTRAVENOUS at 08:26

## 2019-10-30 RX ADMIN — HEPARIN SODIUM 4000 UNITS: 1000 INJECTION INTRAVENOUS; SUBCUTANEOUS at 09:18

## 2019-10-30 RX ADMIN — Medication 2 PUFF: at 21:03

## 2019-10-30 RX ADMIN — HEPARIN SODIUM 3000 UNITS: 1000 INJECTION INTRAVENOUS; SUBCUTANEOUS at 10:04

## 2019-10-30 RX ADMIN — TORSEMIDE 20 MG: 20 TABLET ORAL at 15:57

## 2019-10-30 RX ADMIN — POTASSIUM CHLORIDE 10 MEQ: 750 TABLET, FILM COATED, EXTENDED RELEASE ORAL at 15:57

## 2019-10-30 RX ADMIN — Medication 10 ML: at 20:52

## 2019-10-30 RX ADMIN — DEXAMETHASONE SODIUM PHOSPHATE 4 MG: 4 INJECTION, SOLUTION INTRAMUSCULAR; INTRAVENOUS at 08:47

## 2019-10-30 RX ADMIN — ROSUVASTATIN CALCIUM 20 MG: 20 TABLET, FILM COATED ORAL at 20:51

## 2019-10-30 RX ADMIN — ISOPROTERENOL HYDROCHLORIDE 2 MCG: 0.2 INJECTION, SOLUTION INTRAMUSCULAR; INTRAVENOUS at 10:30

## 2019-10-30 RX ADMIN — ACETAMINOPHEN 650 MG: 325 TABLET, FILM COATED ORAL at 14:50

## 2019-10-30 RX ADMIN — PHENYLEPHRINE HYDROCHLORIDE 100 MCG/MIN: 10 INJECTION INTRAVENOUS at 08:42

## 2019-10-30 RX ADMIN — MONTELUKAST SODIUM 10 MG: 10 TABLET, FILM COATED ORAL at 20:51

## 2019-10-30 RX ADMIN — ONDANSETRON 4 MG: 2 INJECTION INTRAMUSCULAR; INTRAVENOUS at 08:47

## 2019-10-30 RX ADMIN — FUROSEMIDE 40 MG: 10 INJECTION, SOLUTION INTRAMUSCULAR; INTRAVENOUS at 10:54

## 2019-10-30 RX ADMIN — FENTANYL CITRATE 50 MCG: 50 INJECTION INTRAMUSCULAR; INTRAVENOUS at 11:04

## 2019-10-30 RX ADMIN — LIDOCAINE HYDROCHLORIDE 100 MG: 20 INJECTION, SOLUTION EPIDURAL; INFILTRATION; INTRACAUDAL; PERINEURAL at 08:41

## 2019-10-30 RX ADMIN — WARFARIN SODIUM 5 MG: 5 TABLET ORAL at 17:04

## 2019-10-30 RX ADMIN — CARVEDILOL 6.25 MG: 6.25 TABLET, FILM COATED ORAL at 17:05

## 2019-10-30 RX ADMIN — FENTANYL CITRATE 50 MCG: 50 INJECTION INTRAMUSCULAR; INTRAVENOUS at 08:41

## 2019-10-30 ASSESSMENT — PAIN SCALES - GENERAL
PAINLEVEL_OUTOF10: 7
PAINLEVEL_OUTOF10: 0
PAINLEVEL_OUTOF10: 7
PAINLEVEL_OUTOF10: 8
PAINLEVEL_OUTOF10: 8
PAINLEVEL_OUTOF10: 7

## 2019-10-30 ASSESSMENT — PULMONARY FUNCTION TESTS
PIF_VALUE: 20
PIF_VALUE: 20
PIF_VALUE: 19
PIF_VALUE: 19
PIF_VALUE: 17
PIF_VALUE: 20
PIF_VALUE: 1
PIF_VALUE: 22
PIF_VALUE: 20
PIF_VALUE: 20
PIF_VALUE: 19
PIF_VALUE: 20
PIF_VALUE: 22
PIF_VALUE: 21
PIF_VALUE: 21
PIF_VALUE: 20
PIF_VALUE: 19
PIF_VALUE: 20
PIF_VALUE: 21
PIF_VALUE: 21
PIF_VALUE: 0
PIF_VALUE: 1
PIF_VALUE: 21
PIF_VALUE: 20
PIF_VALUE: 20
PIF_VALUE: 16
PIF_VALUE: 5
PIF_VALUE: 20
PIF_VALUE: 19
PIF_VALUE: 21
PIF_VALUE: 20
PIF_VALUE: 19
PIF_VALUE: 1
PIF_VALUE: 20
PIF_VALUE: 0
PIF_VALUE: 20
PIF_VALUE: 20
PIF_VALUE: 21
PIF_VALUE: 20
PIF_VALUE: 0
PIF_VALUE: 19
PIF_VALUE: 21
PIF_VALUE: 20
PIF_VALUE: 0
PIF_VALUE: 19
PIF_VALUE: 20
PIF_VALUE: 19
PIF_VALUE: 19
PIF_VALUE: 3
PIF_VALUE: 1
PIF_VALUE: 21
PIF_VALUE: 21
PIF_VALUE: 0
PIF_VALUE: 20
PIF_VALUE: 19
PIF_VALUE: 19
PIF_VALUE: 20
PIF_VALUE: 20
PIF_VALUE: 19
PIF_VALUE: 20
PIF_VALUE: 2
PIF_VALUE: 21
PIF_VALUE: 20
PIF_VALUE: 19
PIF_VALUE: 20
PIF_VALUE: 1
PIF_VALUE: 20
PIF_VALUE: 20
PIF_VALUE: 21
PIF_VALUE: 20
PIF_VALUE: 15
PIF_VALUE: 21
PIF_VALUE: 20
PIF_VALUE: 20
PIF_VALUE: 1
PIF_VALUE: 20
PIF_VALUE: 19
PIF_VALUE: 20
PIF_VALUE: 21
PIF_VALUE: 1
PIF_VALUE: 19
PIF_VALUE: 19
PIF_VALUE: 20
PIF_VALUE: 20
PIF_VALUE: 15
PIF_VALUE: 20
PIF_VALUE: 19
PIF_VALUE: 21
PIF_VALUE: 1
PIF_VALUE: 20
PIF_VALUE: 0
PIF_VALUE: 20
PIF_VALUE: 20
PIF_VALUE: 21
PIF_VALUE: 3
PIF_VALUE: 1
PIF_VALUE: 1
PIF_VALUE: 20
PIF_VALUE: 0
PIF_VALUE: 19
PIF_VALUE: 0
PIF_VALUE: 19
PIF_VALUE: 20
PIF_VALUE: 22
PIF_VALUE: 20
PIF_VALUE: 21
PIF_VALUE: 20
PIF_VALUE: 2
PIF_VALUE: 21
PIF_VALUE: 20
PIF_VALUE: 19
PIF_VALUE: 20
PIF_VALUE: 1
PIF_VALUE: 20
PIF_VALUE: 19
PIF_VALUE: 20
PIF_VALUE: 0
PIF_VALUE: 20
PIF_VALUE: 21
PIF_VALUE: 20
PIF_VALUE: 20
PIF_VALUE: 21
PIF_VALUE: 19
PIF_VALUE: 22
PIF_VALUE: 19
PIF_VALUE: 19
PIF_VALUE: 20
PIF_VALUE: 20
PIF_VALUE: 1
PIF_VALUE: 20
PIF_VALUE: 20
PIF_VALUE: 3
PIF_VALUE: 20
PIF_VALUE: 19
PIF_VALUE: 15
PIF_VALUE: 19
PIF_VALUE: 16
PIF_VALUE: 17
PIF_VALUE: 20
PIF_VALUE: 21
PIF_VALUE: 21
PIF_VALUE: 0
PIF_VALUE: 19
PIF_VALUE: 1
PIF_VALUE: 1
PIF_VALUE: 19
PIF_VALUE: 19
PIF_VALUE: 20
PIF_VALUE: 1
PIF_VALUE: 0
PIF_VALUE: 21
PIF_VALUE: 20
PIF_VALUE: 19
PIF_VALUE: 20

## 2019-10-30 ASSESSMENT — PAIN DESCRIPTION - PROGRESSION: CLINICAL_PROGRESSION: NOT CHANGED

## 2019-10-30 ASSESSMENT — PAIN DESCRIPTION - ONSET: ONSET: ON-GOING

## 2019-10-30 ASSESSMENT — PAIN DESCRIPTION - LOCATION: LOCATION: BACK

## 2019-10-30 ASSESSMENT — PAIN DESCRIPTION - PAIN TYPE: TYPE: CHRONIC PAIN

## 2019-10-30 ASSESSMENT — PAIN DESCRIPTION - ORIENTATION: ORIENTATION: LOWER

## 2019-10-30 ASSESSMENT — PAIN DESCRIPTION - FREQUENCY: FREQUENCY: CONTINUOUS

## 2019-10-30 ASSESSMENT — PAIN DESCRIPTION - DESCRIPTORS: DESCRIPTORS: ACHING

## 2019-10-31 VITALS
OXYGEN SATURATION: 98 % | RESPIRATION RATE: 20 BRPM | WEIGHT: 200.62 LBS | HEART RATE: 87 BPM | BODY MASS INDEX: 27.17 KG/M2 | DIASTOLIC BLOOD PRESSURE: 76 MMHG | TEMPERATURE: 97.5 F | HEIGHT: 72 IN | SYSTOLIC BLOOD PRESSURE: 136 MMHG

## 2019-10-31 LAB
ANION GAP SERPL CALCULATED.3IONS-SCNC: 10 MMOL/L (ref 3–16)
BUN BLDV-MCNC: 26 MG/DL (ref 7–20)
CALCIUM SERPL-MCNC: 8.7 MG/DL (ref 8.3–10.6)
CHLORIDE BLD-SCNC: 103 MMOL/L (ref 99–110)
CO2: 26 MMOL/L (ref 21–32)
CREAT SERPL-MCNC: 1.4 MG/DL (ref 0.6–1.2)
GFR AFRICAN AMERICAN: 45
GFR NON-AFRICAN AMERICAN: 37
GLUCOSE BLD-MCNC: 206 MG/DL (ref 70–99)
GLUCOSE BLD-MCNC: 218 MG/DL (ref 70–99)
GLUCOSE BLD-MCNC: 226 MG/DL (ref 70–99)
HCT VFR BLD CALC: 33.7 % (ref 36–48)
HEMOGLOBIN: 10.9 G/DL (ref 12–16)
INR BLD: 3.39 (ref 0.86–1.14)
MCH RBC QN AUTO: 27.5 PG (ref 26–34)
MCHC RBC AUTO-ENTMCNC: 32.5 G/DL (ref 31–36)
MCV RBC AUTO: 84.7 FL (ref 80–100)
PDW BLD-RTO: 16.2 % (ref 12.4–15.4)
PERFORMED ON: ABNORMAL
PERFORMED ON: ABNORMAL
PLATELET # BLD: 190 K/UL (ref 135–450)
PMV BLD AUTO: 9 FL (ref 5–10.5)
POTASSIUM SERPL-SCNC: 3.9 MMOL/L (ref 3.5–5.1)
PROTHROMBIN TIME: 38.7 SEC (ref 9.8–13)
RBC # BLD: 3.98 M/UL (ref 4–5.2)
SODIUM BLD-SCNC: 139 MMOL/L (ref 136–145)
TSH REFLEX: 0.41 UIU/ML (ref 0.27–4.2)
WBC # BLD: 5.9 K/UL (ref 4–11)

## 2019-10-31 PROCEDURE — 6370000000 HC RX 637 (ALT 250 FOR IP): Performed by: INTERNAL MEDICINE

## 2019-10-31 PROCEDURE — 84443 ASSAY THYROID STIM HORMONE: CPT

## 2019-10-31 PROCEDURE — 6360000002 HC RX W HCPCS: Performed by: INTERNAL MEDICINE

## 2019-10-31 PROCEDURE — 36415 COLL VENOUS BLD VENIPUNCTURE: CPT

## 2019-10-31 PROCEDURE — 99217 PR OBSERVATION CARE DISCHARGE MANAGEMENT: CPT | Performed by: NURSE PRACTITIONER

## 2019-10-31 PROCEDURE — 85027 COMPLETE CBC AUTOMATED: CPT

## 2019-10-31 PROCEDURE — 85610 PROTHROMBIN TIME: CPT

## 2019-10-31 PROCEDURE — 80048 BASIC METABOLIC PNL TOTAL CA: CPT

## 2019-10-31 RX ORDER — POLYETHYLENE GLYCOL 3350 17 G/17G
17 POWDER, FOR SOLUTION ORAL DAILY
Status: DISCONTINUED | OUTPATIENT
Start: 2019-10-31 | End: 2019-10-31 | Stop reason: HOSPADM

## 2019-10-31 RX ORDER — FUROSEMIDE 10 MG/ML
40 INJECTION INTRAMUSCULAR; INTRAVENOUS ONCE
Status: COMPLETED | OUTPATIENT
Start: 2019-10-31 | End: 2019-10-31

## 2019-10-31 RX ADMIN — DOCUSATE SODIUM 100 MG: 100 CAPSULE ORAL at 10:32

## 2019-10-31 RX ADMIN — SPIRONOLACTONE 25 MG: 25 TABLET ORAL at 10:33

## 2019-10-31 RX ADMIN — PANTOPRAZOLE SODIUM 40 MG: 40 TABLET, DELAYED RELEASE ORAL at 10:32

## 2019-10-31 RX ADMIN — CARVEDILOL 6.25 MG: 6.25 TABLET, FILM COATED ORAL at 17:57

## 2019-10-31 RX ADMIN — INSULIN LISPRO 8 UNITS: 100 INJECTION, SOLUTION INTRAVENOUS; SUBCUTANEOUS at 12:53

## 2019-10-31 RX ADMIN — POTASSIUM CHLORIDE 10 MEQ: 750 TABLET, FILM COATED, EXTENDED RELEASE ORAL at 10:32

## 2019-10-31 RX ADMIN — INSULIN LISPRO 6 UNITS: 100 INJECTION, SOLUTION INTRAVENOUS; SUBCUTANEOUS at 11:06

## 2019-10-31 RX ADMIN — ASPIRIN 81 MG 81 MG: 81 TABLET ORAL at 12:51

## 2019-10-31 RX ADMIN — FUROSEMIDE 40 MG: 10 INJECTION, SOLUTION INTRAMUSCULAR; INTRAVENOUS at 10:34

## 2019-10-31 RX ADMIN — CYANOCOBALAMIN TAB 1000 MCG 500 MCG: 1000 TAB at 10:32

## 2019-10-31 ASSESSMENT — PAIN DESCRIPTION - PAIN TYPE: TYPE: CHRONIC PAIN

## 2019-10-31 ASSESSMENT — PAIN SCALES - GENERAL
PAINLEVEL_OUTOF10: 0

## 2019-11-01 ENCOUNTER — ANTI-COAG VISIT (OUTPATIENT)
Dept: FAMILY MEDICINE CLINIC | Age: 74
End: 2019-11-01

## 2019-11-01 ENCOUNTER — TELEPHONE (OUTPATIENT)
Dept: FAMILY MEDICINE CLINIC | Age: 74
End: 2019-11-01

## 2019-11-15 ENCOUNTER — TELEPHONE (OUTPATIENT)
Dept: CARDIOLOGY CLINIC | Age: 74
End: 2019-11-15

## 2019-11-19 ENCOUNTER — NURSE ONLY (OUTPATIENT)
Dept: CARDIOLOGY CLINIC | Age: 74
End: 2019-11-19
Payer: MEDICARE

## 2019-11-19 DIAGNOSIS — I48.19 PERSISTENT ATRIAL FIBRILLATION (HCC): ICD-10-CM

## 2019-11-19 DIAGNOSIS — I47.1 PAROXYSMAL SVT (SUPRAVENTRICULAR TACHYCARDIA) (HCC): ICD-10-CM

## 2019-11-19 DIAGNOSIS — Z45.09 ENCOUNTER FOR ELECTRONIC ANALYSIS OF REVEAL EVENT RECORDER: ICD-10-CM

## 2019-11-19 DIAGNOSIS — I48.0 PAF (PAROXYSMAL ATRIAL FIBRILLATION) (HCC): ICD-10-CM

## 2019-11-19 PROCEDURE — 93299 PR REM INTERROG ICPMS/SCRMS <30 D TECH REVIEW: CPT | Performed by: INTERNAL MEDICINE

## 2019-11-19 PROCEDURE — 93298 REM INTERROG DEV EVAL SCRMS: CPT | Performed by: INTERNAL MEDICINE

## 2019-11-21 ENCOUNTER — TELEPHONE (OUTPATIENT)
Dept: FAMILY MEDICINE CLINIC | Age: 74
End: 2019-11-21

## 2019-12-05 ENCOUNTER — ANTI-COAG VISIT (OUTPATIENT)
Dept: FAMILY MEDICINE CLINIC | Age: 74
End: 2019-12-05

## 2019-12-05 LAB — INR BLD: 3.6

## 2019-12-18 ENCOUNTER — HOSPITAL ENCOUNTER (OUTPATIENT)
Age: 74
Discharge: HOME | End: 2019-12-18
Payer: MEDICARE

## 2019-12-18 DIAGNOSIS — E11.65 TYPE 2 DIABETES MELLITUS WITH HYPERGLYCEMIA, WITH LONG-TERM CURRENT USE OF INSULIN (HCC): ICD-10-CM

## 2019-12-18 DIAGNOSIS — Z79.4 TYPE 2 DIABETES MELLITUS WITH HYPERGLYCEMIA, WITH LONG-TERM CURRENT USE OF INSULIN (HCC): ICD-10-CM

## 2019-12-18 DIAGNOSIS — K52.81 EOSINOPHILIC GASTRITIS: ICD-10-CM

## 2019-12-18 DIAGNOSIS — I25.10 CORONARY ARTERY DISEASE INVOLVING NATIVE CORONARY ARTERY OF NATIVE HEART WITHOUT ANGINA PECTORIS: ICD-10-CM

## 2019-12-18 LAB
ANION GAP SERPL CALCULATED.3IONS-SCNC: 12 MMOL/L (ref 3–16)
BASOPHILS ABSOLUTE: 0 K/UL (ref 0–0.2)
BASOPHILS RELATIVE PERCENT: 0.6 %
BUN BLDV-MCNC: 25 MG/DL (ref 7–20)
CALCIUM SERPL-MCNC: 8.8 MG/DL (ref 8.3–10.6)
CHLORIDE BLD-SCNC: 105 MMOL/L (ref 99–110)
CO2: 25 MMOL/L (ref 21–32)
CREAT SERPL-MCNC: 1.2 MG/DL (ref 0.6–1.2)
DIGOXIN LEVEL: <0.3 NG/ML (ref 0.8–2)
EOSINOPHILS ABSOLUTE: 0.9 K/UL (ref 0–0.6)
EOSINOPHILS RELATIVE PERCENT: 19 %
ESTIMATED AVERAGE GLUCOSE: 148.5 MG/DL
GFR AFRICAN AMERICAN: 53
GFR NON-AFRICAN AMERICAN: 44
GLUCOSE BLD-MCNC: 122 MG/DL (ref 70–99)
HBA1C MFR BLD: 6.8 %
HCT VFR BLD CALC: 33.2 % (ref 36–48)
HEMOGLOBIN: 11 G/DL (ref 12–16)
LYMPHOCYTES ABSOLUTE: 1 K/UL (ref 1–5.1)
LYMPHOCYTES RELATIVE PERCENT: 21.3 %
MCH RBC QN AUTO: 27.9 PG (ref 26–34)
MCHC RBC AUTO-ENTMCNC: 33 G/DL (ref 31–36)
MCV RBC AUTO: 84.5 FL (ref 80–100)
MONOCYTES ABSOLUTE: 0.6 K/UL (ref 0–1.3)
MONOCYTES RELATIVE PERCENT: 13.4 %
NEUTROPHILS ABSOLUTE: 2.1 K/UL (ref 1.7–7.7)
NEUTROPHILS RELATIVE PERCENT: 45.7 %
PDW BLD-RTO: 15.9 % (ref 12.4–15.4)
PLATELET # BLD: 208 K/UL (ref 135–450)
PMV BLD AUTO: 9.2 FL (ref 5–10.5)
POTASSIUM SERPL-SCNC: 4.1 MMOL/L (ref 3.5–5.1)
RBC # BLD: 3.93 M/UL (ref 4–5.2)
SODIUM BLD-SCNC: 142 MMOL/L (ref 136–145)
WBC # BLD: 4.5 K/UL (ref 4–11)

## 2019-12-18 PROCEDURE — 85025 COMPLETE CBC W/AUTO DIFF WBC: CPT

## 2019-12-18 PROCEDURE — 80162 ASSAY OF DIGOXIN TOTAL: CPT

## 2019-12-18 PROCEDURE — 80048 BASIC METABOLIC PNL TOTAL CA: CPT

## 2019-12-18 PROCEDURE — 83036 HEMOGLOBIN GLYCOSYLATED A1C: CPT

## 2019-12-18 PROCEDURE — 36415 COLL VENOUS BLD VENIPUNCTURE: CPT

## 2019-12-19 ENCOUNTER — NURSE ONLY (OUTPATIENT)
Dept: CARDIOLOGY CLINIC | Age: 74
End: 2019-12-19
Payer: MEDICARE

## 2019-12-19 ENCOUNTER — OFFICE VISIT (OUTPATIENT)
Dept: CARDIOLOGY CLINIC | Age: 74
End: 2019-12-19
Payer: MEDICARE

## 2019-12-19 ENCOUNTER — OFFICE VISIT (OUTPATIENT)
Dept: FAMILY MEDICINE CLINIC | Age: 74
End: 2019-12-19
Payer: MEDICARE

## 2019-12-19 VITALS
RESPIRATION RATE: 12 BRPM | DIASTOLIC BLOOD PRESSURE: 70 MMHG | SYSTOLIC BLOOD PRESSURE: 104 MMHG | HEIGHT: 66 IN | WEIGHT: 206.25 LBS | BODY MASS INDEX: 33.15 KG/M2 | OXYGEN SATURATION: 96 % | HEART RATE: 103 BPM

## 2019-12-19 VITALS
SYSTOLIC BLOOD PRESSURE: 113 MMHG | DIASTOLIC BLOOD PRESSURE: 78 MMHG | HEART RATE: 98 BPM | WEIGHT: 207 LBS | BODY MASS INDEX: 34.49 KG/M2 | HEIGHT: 65 IN

## 2019-12-19 DIAGNOSIS — M15.9 GENERALIZED OSTEOARTHROSIS, INVOLVING MULTIPLE SITES: ICD-10-CM

## 2019-12-19 DIAGNOSIS — Z45.09 ENCOUNTER FOR ELECTRONIC ANALYSIS OF REVEAL EVENT RECORDER: ICD-10-CM

## 2019-12-19 DIAGNOSIS — E11.65 TYPE 2 DIABETES MELLITUS WITH HYPERGLYCEMIA, WITH LONG-TERM CURRENT USE OF INSULIN (HCC): Primary | ICD-10-CM

## 2019-12-19 DIAGNOSIS — E53.8 B12 DEFICIENCY: ICD-10-CM

## 2019-12-19 DIAGNOSIS — Z45.09 ENCOUNTER FOR LOOP RECORDER CHECK: ICD-10-CM

## 2019-12-19 DIAGNOSIS — I25.10 CORONARY ARTERY DISEASE INVOLVING NATIVE CORONARY ARTERY OF NATIVE HEART WITHOUT ANGINA PECTORIS: ICD-10-CM

## 2019-12-19 DIAGNOSIS — I50.42 CHRONIC COMBINED SYSTOLIC (CONGESTIVE) AND DIASTOLIC (CONGESTIVE) HEART FAILURE (HCC): ICD-10-CM

## 2019-12-19 DIAGNOSIS — Z79.4 TYPE 2 DIABETES MELLITUS WITH HYPERGLYCEMIA, WITH LONG-TERM CURRENT USE OF INSULIN (HCC): Primary | ICD-10-CM

## 2019-12-19 DIAGNOSIS — Z86.711 HISTORY OF PULMONARY EMBOLISM: ICD-10-CM

## 2019-12-19 DIAGNOSIS — I48.19 PERSISTENT ATRIAL FIBRILLATION (HCC): Primary | ICD-10-CM

## 2019-12-19 DIAGNOSIS — K52.81 EOSINOPHILIC GASTRITIS: ICD-10-CM

## 2019-12-19 DIAGNOSIS — I48.0 PAF (PAROXYSMAL ATRIAL FIBRILLATION) (HCC): ICD-10-CM

## 2019-12-19 DIAGNOSIS — I15.9 SECONDARY HYPERTENSION: ICD-10-CM

## 2019-12-19 DIAGNOSIS — Z98.890 S/P MVR (MITRAL VALVE REPAIR): ICD-10-CM

## 2019-12-19 PROBLEM — I50.43 ACUTE ON CHRONIC COMBINED SYSTOLIC AND DIASTOLIC CONGESTIVE HEART FAILURE (HCC): Status: RESOLVED | Noted: 2018-11-12 | Resolved: 2019-12-19

## 2019-12-19 LAB — INR BLD: 2.8

## 2019-12-19 PROCEDURE — 93291 INTERROG DEV EVAL SCRMS IP: CPT | Performed by: INTERNAL MEDICINE

## 2019-12-19 PROCEDURE — 3288F FALL RISK ASSESSMENT DOCD: CPT | Performed by: FAMILY MEDICINE

## 2019-12-19 PROCEDURE — 99214 OFFICE O/P EST MOD 30 MIN: CPT | Performed by: FAMILY MEDICINE

## 2019-12-19 PROCEDURE — 99214 OFFICE O/P EST MOD 30 MIN: CPT | Performed by: NURSE PRACTITIONER

## 2019-12-19 RX ORDER — TORSEMIDE 20 MG/1
TABLET ORAL
Qty: 90 TABLET | Refills: 1 | Status: SHIPPED | OUTPATIENT
Start: 2019-12-19 | End: 2020-05-22 | Stop reason: SDUPTHER

## 2019-12-19 RX ORDER — CARVEDILOL 6.25 MG/1
6.25 TABLET ORAL 2 TIMES DAILY WITH MEALS
Qty: 180 TABLET | Refills: 1 | Status: SHIPPED | OUTPATIENT
Start: 2019-12-19 | End: 2020-07-10

## 2019-12-19 RX ORDER — MONTELUKAST SODIUM 10 MG/1
TABLET ORAL
Qty: 90 TABLET | Refills: 0 | Status: SHIPPED | OUTPATIENT
Start: 2019-12-19 | End: 2020-02-26

## 2019-12-30 RX ORDER — INSULIN LISPRO 100 [IU]/ML
INJECTION, SOLUTION INTRAVENOUS; SUBCUTANEOUS
Qty: 15 ML | Refills: 8 | Status: SHIPPED | OUTPATIENT
Start: 2019-12-30 | End: 2021-08-17

## 2020-01-03 ENCOUNTER — ANTI-COAG VISIT (OUTPATIENT)
Dept: FAMILY MEDICINE CLINIC | Age: 75
End: 2020-01-03

## 2020-01-03 ENCOUNTER — HOSPITAL ENCOUNTER (OUTPATIENT)
Dept: CARDIAC CATH/INVASIVE PROCEDURES | Age: 75
Discharge: HOME | End: 2020-01-03
Attending: INTERNAL MEDICINE | Admitting: INTERNAL MEDICINE
Payer: MEDICARE

## 2020-01-03 VITALS
SYSTOLIC BLOOD PRESSURE: 131 MMHG | HEIGHT: 65 IN | TEMPERATURE: 98.5 F | WEIGHT: 207 LBS | BODY MASS INDEX: 34.49 KG/M2 | RESPIRATION RATE: 16 BRPM | DIASTOLIC BLOOD PRESSURE: 75 MMHG | HEART RATE: 112 BPM

## 2020-01-03 LAB
EKG ATRIAL RATE: 107 BPM
EKG ATRIAL RATE: 79 BPM
EKG DIAGNOSIS: NORMAL
EKG DIAGNOSIS: NORMAL
EKG P-R INTERVAL: 280 MS
EKG Q-T INTERVAL: 344 MS
EKG Q-T INTERVAL: 414 MS
EKG QRS DURATION: 84 MS
EKG QRS DURATION: 88 MS
EKG QTC CALCULATION (BAZETT): 469 MS
EKG QTC CALCULATION (BAZETT): 474 MS
EKG R AXIS: 55 DEGREES
EKG R AXIS: 66 DEGREES
EKG T AXIS: 93 DEGREES
EKG T AXIS: 96 DEGREES
EKG VENTRICULAR RATE: 112 BPM
EKG VENTRICULAR RATE: 79 BPM
GFR AFRICAN AMERICAN: 59
GFR NON-AFRICAN AMERICAN: 49
GLUCOSE BLD-MCNC: 118 MG/DL (ref 70–99)
INR BLD: 3.4
INR BLD: 3.6 (ref 0.86–1.14)
PERFORMED ON: ABNORMAL
POC BUN: 22 MG/DL (ref 7–18)
POC CREATININE: 1.1 MG/DL (ref 0.6–1.2)
POC HEMATOCRIT: 34 % (ref 36–48)
POC POTASSIUM: 3.7 MMOL/L (ref 3.5–5.1)
POC SAMPLE TYPE: ABNORMAL
POC SODIUM: 140 MMOL/L (ref 136–145)

## 2020-01-03 PROCEDURE — 82565 ASSAY OF CREATININE: CPT

## 2020-01-03 PROCEDURE — 92960 CARDIOVERSION ELECTRIC EXT: CPT | Performed by: INTERNAL MEDICINE

## 2020-01-03 PROCEDURE — 92960 CARDIOVERSION ELECTRIC EXT: CPT

## 2020-01-03 PROCEDURE — 85014 HEMATOCRIT: CPT

## 2020-01-03 PROCEDURE — 93010 ELECTROCARDIOGRAM REPORT: CPT | Performed by: INTERNAL MEDICINE

## 2020-01-03 PROCEDURE — 84520 ASSAY OF UREA NITROGEN: CPT

## 2020-01-03 PROCEDURE — 2500000003 HC RX 250 WO HCPCS

## 2020-01-03 PROCEDURE — 85610 PROTHROMBIN TIME: CPT

## 2020-01-03 PROCEDURE — 84132 ASSAY OF SERUM POTASSIUM: CPT

## 2020-01-03 PROCEDURE — 93005 ELECTROCARDIOGRAM TRACING: CPT | Performed by: INTERNAL MEDICINE

## 2020-01-03 PROCEDURE — 84295 ASSAY OF SERUM SODIUM: CPT

## 2020-01-03 PROCEDURE — 82947 ASSAY GLUCOSE BLOOD QUANT: CPT

## 2020-01-03 PROCEDURE — 7100000010 HC PHASE II RECOVERY - FIRST 15 MIN

## 2020-01-03 NOTE — H&P
ondansetron (ZOFRAN) 4 MG tablet Take 1 tablet by mouth 3 times daily as needed for Nausea or Vomiting Yes Lynnwood Sandhoff, MD   vitamin B-12 500 MCG tablet Take 1 tablet by mouth daily Yes Nuvia Palacio MD   albuterol sulfate HFA (PROAIR HFA) 108 (90 BASE) MCG/ACT inhaler Inhale 2 puffs into the lungs every 6 hours as needed for Wheezing Yes Lynnwood Sandhoff, MD   HUMALOG KWIKPEN 100 UNIT/ML SOPN TAKE AS INSTRUCTED BY YOUR PRESCRIBER  Lynnwood Sandhoff, MD   FREESTYLE LANCETS MISC 1 each by Does not apply route 4 times daily  Lynnwood Sandhoff, MD   blood glucose test strips (FREESTYLE LITE) strip 1 each by Does not apply route 4 times daily Patient to check blood sugar 4 times a day and PRN, she is treated with multiple daily injections of insulin that require correction dosing ;A1C 8.1 ; ICD code-E11.65 ,Z79.4  Lynnwood Sandhoff, MD   BD PEN NEEDLE JANNET U/F 32G X 4 MM MISC USE 1 PEN NEEDLE FOUR TIMES A DAY  Lynnwood Sandhoff, MD   Blood Glucose Monitoring Suppl (FREESTYLE LITE) GAETANO 1 Device by Does not apply route 4 times daily Patient to check blood sugar 4 times a day and PRN, she is treated with multiple daily injections of insulin that require correction dosing ;A1C 8.1 ; ICD code-E11.65 ,Z79.4  Patient taking differently: 1 Device by Does not apply route 2 times daily   Jorge Lyons, APRN - CNP      Past Medical History:  Past Medical History:   Diagnosis Date    Asthma     Atrial fibrillation (HCC)     Eosinophilia     Hemoptysis     HIGH CHOLESTEROL     Hx of blood clots     Hypertension     Irregular heart beat     Other specified gastritis without mention of hemorrhage     Palpitations     Skin cancer     basal and squamous    Type II or unspecified type diabetes mellitus without mention of complication, not stated as uncontrolled      Past Surgical History:    has a past surgical history that includes Cholecystectomy;   section; Colonoscopy (2017); skin biopsy; bronchoscopy (07/18/2016); Coronary artery bypass graft (08/21/2018); Mitral valve replacement (08/21/2018); transesophageal echocardiogram (08/21/2018); Tunneled venous catheter placement (Left, 08/23/2018); Cardiac catheterization (08/16/2018); Insertable Cardiac Monitor (Left, 08/16/2018); Colonoscopy (01/17/2014); Hysterectomy; Upper gastrointestinal endoscopy (N/A, 10/10/2018); and Colonoscopy (10/10/2018). Social History:  Reviewed. reports that she has never smoked. She has never used smokeless tobacco. She reports that she does not drink alcohol or use drugs. Family History:  Reviewed. family history includes Asthma in her mother; Cancer in her father; Heart Disease in her mother; High Blood Pressure in her mother; Hypertension in her mother. Review of System:  · Constitutional: Negative for fever, night sweats, chills, weight changes, or weakness  · Skin: Negative for rash, dry skin, pruritus, bruising, bleeding, blood clots, or changes in skin pigment  · HEENT: Negative for vision changes, ringing in the ears, sore throat, dysphagia, or swollen lymph nodes  · Respiratory: Positive for SOB (Chronic)  · Cardiovascular: Reviewed in HPI  · Gastrointestinal: Negative for abdominal pain, N/V/D, constipation, or black/tarry stools  · Genito-Urinary: Negative for dysuria, incontinence, urgency, or hematuria  · Musculoskeletal: Negative for joint swelling, muscle pain, or injuries  · Neurological/Psych: Negative for confusion, seizures, dizziness, headaches, balance issues or TIA-like symptoms.  No anxiety, depression, or insomnia    Physical Examination:  Vitals:    01/03/20 0645   BP: 131/75   Pulse: 112   Resp: 16   Temp: 98.5 °F (36.9 °C)      Wt Readings from Last 3 Encounters:   01/03/20 207 lb (93.9 kg)   12/19/19 207 lb (93.9 kg)   12/19/19 206 lb 4 oz (93.6 kg)     Constitutional: Cooperative and in no apparent distress, and appears well nourished  Skin: Warm and pink; no pallor, cyanosis, bruising, or clubbing  HEENT: Symmetric and normocephalic. PERRL, EOM intact. Conjunctiva pink with clear sclera. Mucus membranes pink and moist. Teeth intact. Thyroid smooth without nodules or goiter  Respiratory: Respirations symmetric and unlabored. Lungs clear to auscultation bilaterally, no wheezing, rhonchi, or crackles  Cardiovascular:  Tachycardic rate and irregular rhythm. S1/S2 present without murmurs, rubs, or gallops. Peripheral pulses 2+, capillary refill < 3 seconds. No elevation of JVP. Trace BLE edema  Gastrointestinal: Abdomen soft and round. Bowel sounds normoactive in all quadrants without tenderness or masses. + Obese  Musculoskeletal: Bilateral upper and lower extremity strength 5/5 with full ROM. Neurological/Psych: Awake and orientated to person, place and time. Calm affect, appropriate mood. Pertinent labs, diagnostic, device, and imaging results reviewed as a part of this visit    LABS    CBC:   Lab Results   Component Value Date    WBC 4.5 2019    HGB 11.0 (L) 2019    HCT 33.2 (L) 2019    MCV 84.5 2019     2019     BMP:   Lab Results   Component Value Date    CREATININE 1.1 2020    BUN 25 (H) 2019     2019    K 4.1 2019     2019    CO2 25 2019     Estimated Creatinine Clearance: 52 mL/min (based on SCr of 1.1 mg/dL).      Thyroid: No results found for: TSH, Y2ZJSIJ, X0OZCSL, THYROIDAB  Lipid Panel:   Lab Results   Component Value Date    CHOL 131 2019    CHOL 98 2019    HDL 33 2019    TRIG 202 2019     LFTs:  Lab Results   Component Value Date    ALT 9 (L) 10/30/2019    AST 13 (L) 10/30/2019    ALKPHOS 98 10/30/2019    BILITOT 0.6 10/30/2019     Coags:   Lab Results   Component Value Date    PROTIME 38.7 (H) 10/31/2019    INR 3.60 (H) 2020    APTT 42.3 (H) 2019       EC/3/2020  - Atrial fibrillation with incomplete LBBB, rate 101, QRS 96, QTc 439    JUDE: 10/30/19  Normal left ventricular cavity size and wall thickness. Global left   ventricular function is moderate-to-severely decreased with ejection   fraction estimated from 35 % to 40 %. Severe apical akinesis noted. The bioprosthetic mitral valve is well seated with a mean gradient of 2 mmHg   and maximum pressure gradient of 5 mmHg. There is trivial mitral   regurgitation. Left atrial enlargement. Spontaneous echo contrast seen in the left atrium. Stump of the left atrial appendage noted with no thrombus. The aortic valve is thickened/calcified with decreased leaflet mobility   consistent with aortic stenosis. There is trivial aortic insufficiency. Echo: 9/20/19   -Borderline global systolic function with an ejection fraction estimated at   45-50%. -Apical akinesis noted.   -Left atrial enlargement noted based on volume index.   -The bioprosthetic mitral valve is well seated with peak velocity of 2.58m/s   and a mean gradient of 9 mmHg. The mitral valve area by pressure halftime is   estimated at 2.22 cm^2. There is trivial mitral regurgitation.   -Mild aortic stenosis with a peak velocity of 2.39m/s and a mean pressure   gradient of 13 mmHg. The aortic valve area is estimated at 1.16 cm^2 by   continuity equation and 1.22 cm^2 by planimetry. There is trivial aortic   insufficiency.   -There is mild-to-moderate tricuspid regurgitation with a RVSP estimation of   47 mmHg. -The IVC is dilated . -Indeterminate diastolic function. GXT: 8/2018  Small sized lateral completely reversible defect of mild intensity    consistent with ischemia in the territory of the LCx and/or LAD .    Normal LV function.    Overall findings represent a intermediate risk scan. Cath: 8/2018  Heavily calcified vessels  Mild AS-Normal LV FXN  90% mid LAD  90% prox Diag 1  90% mid Cx  Mild Dominant RCA     REC: CVTS opinion--Heavy Ca, DM, Chronic AC Rx---CABG vs LAD/Diag stenting-    Assessment:    1. Continue with coreg 6.25 mg BID, spironolactone 25 mg QD, torsemide 20 mg QD  - Aggressive medical therapy with risk factor modification  - Discussed with patient importance of monitoring weight, low sodium diet and fluid restriction    7. Hx of DVT   - On coumadin   - Ok to come off coumadin short-term if needed from afib standpoint due to left atrial appendage ligation    Diet & Exercise:   The patient is counseled to follow a low salt diet to assure blood pressure remains controlled for cardiovascular risk factor modification   The patient is counseled to avoid excess caffeine, and energy drinks as this may exacerbated ectopy and arrhythmia   The patient is counseled to lose weight to control cardiovascular risk factors   Exercise program discussed: To improve overall cardiovascular health, the patient is instructed to increase cardiovascular related activities with a goal of 150 min/week of moderate level activity or 10,000 steps per day. Encouraged to perform as much activity as tolerated    Quality Metrics  1. Tobacco Cessation Counseling: N/A  2. Retake of BP if >140/90: N/A  3. Documentation to PCP: Note sent to PCP office visit  4. CAD patient on anti-platelet: N/A   5.   CAD patient on STATIN therapy: N/A   6. Patient with history of CHF and atrial fibrillation on anticoagulation: Yes (Coumadin)     I have addressed the patient's cardiac risk factors and adjusted pharmacologic treatment as needed. In addition, I have reinforced the need for patient directed risk factor modification. I independently reviewed the device check interrogation and ECG    All questions and concerns were addressed with the patient. Alternatives to treatment were discussed. Thank you for allowing to us to participate in the care of Gunjan Max. H&P Update    I have reviewed the history and physical and examined the patient and updated with relevant changes.      Consent: I have discussed with the patient and/or the patient representative the indication, alternatives, and the possible risks and/or complications of the planned procedure and the anesthesia methods. The patient and/or patient representative appear to understand and agree to proceed. Vitals:    01/03/20 0645   BP: 131/75   Pulse: 112   Resp: 16   Temp: 98.5 °F (36.9 °C)     Prior to Admission medications    Medication Sig Start Date End Date Taking? Authorizing Provider   torsemide (DEMADEX) 20 MG tablet TAKE 1 TABLET DAILY 12/19/19  Yes Arlene Acosta MD   carvedilol (COREG) 6.25 MG tablet Take 1 tablet by mouth 2 times daily (with meals) 12/19/19  Yes Arlene Acosta MD   montelukast (SINGULAIR) 10 MG tablet TAKE 1 TABLET NIGHTLY 12/19/19  Yes Arlene Acosta MD   oxyCODONE HCl 5 MG TABA Take 5 mg by mouth 4 times daily as needed (prn) for up to 30 days.  12/19/19 1/18/20 Yes Arlene Acosta MD   exenatide (BYETTA 10 MCG PEN) 10 MCG/0.04ML injection Inject 0.04 mLs into the skin 2 times daily (with meals) 12/19/19  Yes Arlene Acosta MD   nystatin (MYCOSTATIN) 048714 UNIT/ML suspension TAKE ONE TEASPOONFUL BY MOUTH FOUR TIMES A DAY FOR 5 DAYS 11/20/19  Yes Arlene Acosta MD   rosuvastatin (CRESTOR) 20 MG tablet TAKE 1 TABLET DAILY 9/11/19  Yes Arlene Acosta MD   insulin glargine (LANTUS) 100 UNIT/ML injection pen 50 unit AM and 24 unit bedtime 9/11/19  Yes Arlene Acosta MD   potassium chloride (KLOR-CON M) 10 MEQ extended release tablet TAKE 1 TABLET DAILY 7/25/19  Yes Basia Yeung MD   spironolactone (ALDACTONE) 25 MG tablet TAKE 1 TABLET DAILY 7/25/19  Yes Basia Yeung MD   aspirin 81 MG chewable tablet Take 1 tablet by mouth daily 6/7/19  Yes Arlene Acosta MD   docusate sodium (COLACE) 100 MG capsule Take 1 capsule by mouth daily 6/7/19  Yes Arlene Acosta MD   warfarin (COUMADIN) 5 MG tablet Take 1 tablet by mouth daily 4/8/19  Yes Arlene Acosta MD   omeprazole (PRILOSEC) 40 MG delayed release mellitus without mention of complication, not stated as uncontrolled      Past Surgical History:   Procedure Laterality Date    BRONCHOSCOPY  07/18/2016    Dr. Bud Campo - brushings from [address] [place] [address],[id number]  08/16/2018    Dr. Mercy Pérez  02/07/2017    Dr. John Kimble - sigmoid diverticulosis, polypectomies x3    COLONOSCOPY  01/17/2014    Dr. John Kimble - sigmoid diverticulosis, polypectomies x3, internal hemorrhoids    COLONOSCOPY  10/10/2018    w/biopsy performed by Kj Judge MD at [address]  08/21/2018    Dr. Edwin Roy - x3 (LIMA-LAD, L SV-D1-PLV) modified BL MAZE procedure w/obliteration of WAYNE using 45mm AtriClip    HYSTERECTOMY      INSERTABLE CARDIAC MONITOR Left 08/16/2018    Dr. Madonna Damon - Burnard Pacer SN# [id number] Medtronic    MITRAL VALVE REPLACEMENT  08/21/2018    Dr. Edwin Roy - 27mm Medtronic Cinch tissue valve    SKIN BIOPSY      TRANSESOPHAGEAL ECHOCARDIOGRAM  08/21/2018    during CABG/MVR    TUNNELED VENOUS CATHETER PLACEMENT Left 08/23/2018    Dr. Beba Pozo for HD    UPPER GASTROINTESTINAL ENDOSCOPY N/A 10/10/2018    w/biopsy [facility] at [facility]   Allergen Reactions    Pwhdowsj-Ekcwsdv-Tusrac [Fluocinolone] Shortness Of Breath    Ciprofloxacin Shortness Of Breath    Diovan [Valsartan] Shortness Of Breath    Flagyl [Metronidazole] Shortness Of Breath     Has taken diflucan at home 12/7/15    Metformin And Related [Metformin And Related] Shortness Of Breath    Benazepril      Other reaction(s): Not Recorded    Morphine      Bad reaction. \"makes her feel horrible\".  Saxagliptin      Other reaction(s): Not Recorded    Levaquin [Levofloxacin] Rash       Pre-Sedation Documentation and Exam:   I have personally completed a history, physical exam & review of systems for this patient (see notes).     Mallampati Airway Assessment:  Class

## 2020-01-03 NOTE — PROCEDURES
[address]     Electrophysiology Procedure Note       Date of Procedure: 1/3/2020  Patient's Name: Christiano Moreno  YOB: 1945   Medical Record Number: [id number]  Procedure Performed by: Kristy Shepherd MD    Procedures performed:  IV sedation. External Electrical cardioversion     Indication of the procedure: Persistent atrial fibrillation     Details of procedure: The patient was brought to the cath lab area in a fasting and non-sedated state. The risks, benefits and alternatives of the procedure were discussed with the patient. The patient opted to proceed with the procedure. Written informed consent was signed and placed in the chart. A timeout protocol was completed to identify the patient and the procedure being performed. Patient is on chronic anticoagulation therapy. Then we used brevital for sedation and electrical DC cardioversion was perfomred using 200J, synchronized shock. Patient was converted to sinus rhythm. The patient tolerated the procedure well and there were no complications.      Conclusion:   Successful external DC cardioversion of atrial fibrillation

## 2020-01-13 ENCOUNTER — ANTI-COAG VISIT (OUTPATIENT)
Dept: FAMILY MEDICINE CLINIC | Age: 75
End: 2020-01-13

## 2020-01-13 LAB — INR BLD: 2.3

## 2020-01-20 PROBLEM — Z95.2 H/O MITRAL VALVE REPLACEMENT: Status: ACTIVE | Noted: 2018-08-22

## 2020-01-20 PROBLEM — I65.23 OCCLUSION AND STENOSIS OF BILATERAL CAROTID ARTERIES: Status: ACTIVE | Noted: 2020-01-20

## 2020-01-20 PROBLEM — I25.5 ISCHEMIC CARDIOMYOPATHY: Status: ACTIVE | Noted: 2020-01-20

## 2020-01-20 PROCEDURE — 93298 REM INTERROG DEV EVAL SCRMS: CPT | Performed by: INTERNAL MEDICINE

## 2020-01-20 PROCEDURE — G2066 INTER DEVC REMOTE 30D: HCPCS | Performed by: INTERNAL MEDICINE

## 2020-01-20 RX ORDER — BLOOD-GLUCOSE METER
KIT MISCELLANEOUS
Qty: 100 STRIP | Refills: 5 | Status: SHIPPED | OUTPATIENT
Start: 2020-01-20 | End: 2020-01-22 | Stop reason: SDUPTHER

## 2020-01-20 NOTE — PROGRESS NOTES
mouth daily as needed ) 30 capsule 5    ondansetron (ZOFRAN) 4 MG tablet Take 1 tablet by mouth 3 times daily as needed for Nausea or Vomiting 30 tablet 0    vitamin B-12 500 MCG tablet Take 1 tablet by mouth daily 30 tablet 3    Blood Glucose Monitoring Suppl (FREESTYLE LITE) GAETANO 1 Device by Does not apply route 4 times daily Patient to check blood sugar 4 times a day and PRN, she is treated with multiple daily injections of insulin that require correction dosing ;A1C 8.1 ; ICD code-E11.65 ,Z79.4 (Patient taking differently: 1 Device by Does not apply route 2 times daily ) 1 Device 0    albuterol sulfate HFA (PROAIR HFA) 108 (90 BASE) MCG/ACT inhaler Inhale 2 puffs into the lungs every 6 hours as needed for Wheezing 3 Inhaler 3     No current facility-administered medications for this visit. Allergies:  Jpkybafi-eahfwjk-pmppqh [fluocinolone]; Ciprofloxacin; Diovan [valsartan]; Flagyl [metronidazole]; Metformin and related [metformin and related]; Benazepril; Morphine; Saxagliptin; and Levaquin [levofloxacin]     Review of Systems:   · Constitutional: there has been no unanticipated weight loss. There's been no change in energy level, sleep pattern, or activity level. · Eyes: No visual changes or diplopia. No scleral icterus. · ENT: No Headaches, hearing loss or vertigo. No mouth sores or sore throat. · Cardiovascular: Reviewed in HPI  · Respiratory: No cough or wheezing, no sputum production. No hematemesis. · Gastrointestinal: No abdominal pain, appetite loss, blood in stools. No change in bowel or bladder habits. · Genitourinary: No dysuria, trouble voiding, or hematuria. · Musculoskeletal:  No gait disturbance, weakness or joint complaints. · Integumentary: No rash or pruritis. · Neurological: No headache, diplopia, change in muscle strength, numbness or tingling. No change in gait, balance, coordination, mood, affect, memory, mentation, behavior.   · Psychiatric: No anxiety, no score 5  On coumadin  8/16/18 ILR  1/3/2020 cardioversion  [facility])  1/21/2020 ekg-atrial fibrillation--rate controlled, no symptoms when  She is in atrial fibrillation      htn  /70 (Site: Right Upper Arm, Position: Sitting, Cuff Size: Large Adult)   Pulse 102   Ht 5' 5\" (1.651 m)   Wt 203 lb 14.4 oz (92.5 kg)   SpO2 97%   Breastfeeding No   BMI 33.93 kg/m²    On coreg aldactone    hchol  On crestor 20 mg daily  6/12/19  Tc 131  hdl 33 ldl 71 tri 202 alt 9 ast 13  stable      DVT/PE  By history  On coumadin    Carotid disease  8/17/18 carotids--less than 50 %bilateral  Will repeat     Plan:  Carotids   Follow  Up in one  Year  Call  For any changes  Recommend daily exercise    Scribe Attestation:  [place], [place] scribing for and in the presence of Jose Alfredo Vega MD.   Anthony Books 01/21/20 8:01 AM   Provider Liu Bennett is working as a scribe for and in the presence of me (Jose Alfredo Vega MD). Working as a scribe, Servando Gregorio may have prepopulated components of this note with my historical  intellectual property under my direct supervision. Any additions to this intellectual property were performed in my presence and at my direction. Furthermore, the content and accuracy of this note have been reviewed by me Jose Alfredo Vega MD). Thank you for allowing me to participate in the care of this individual.    Julia Oh M.D., [facility].

## 2020-01-21 ENCOUNTER — NURSE ONLY (OUTPATIENT)
Dept: CARDIOLOGY CLINIC | Age: 75
End: 2020-01-21
Payer: MEDICARE

## 2020-01-21 ENCOUNTER — OFFICE VISIT (OUTPATIENT)
Dept: CARDIOLOGY CLINIC | Age: 75
End: 2020-01-21
Payer: MEDICARE

## 2020-01-21 VITALS
SYSTOLIC BLOOD PRESSURE: 110 MMHG | HEIGHT: 65 IN | WEIGHT: 203.9 LBS | DIASTOLIC BLOOD PRESSURE: 70 MMHG | HEART RATE: 102 BPM | BODY MASS INDEX: 33.97 KG/M2 | OXYGEN SATURATION: 97 %

## 2020-01-21 PROCEDURE — 93000 ELECTROCARDIOGRAM COMPLETE: CPT | Performed by: INTERNAL MEDICINE

## 2020-01-21 PROCEDURE — 99214 OFFICE O/P EST MOD 30 MIN: CPT | Performed by: INTERNAL MEDICINE

## 2020-01-21 NOTE — LETTER
[address] [facility] [phone]  [address] [address] [phone]    Pacemaker/Defibrillator Clinic          01/20/20        Marj Triana  [address] [address]        Dear Marj Triana    This letter is to inform you that we received the transmission from your monitor at home that checks your implanted heart device. The next date your monitor will automatically transmit will be 2-24-20. If your report needs attention we will notify you. Your device and monitor are wireless and most transmit cellularly, but please periodically check your monitor is still plugged in to the electrical outlet. If you still use the telephone land line to send please ensure the connection to the phone abel is secure. This will help to ensure successful automatic transmissions in the future. Also, the monitor needs to be close to you while sleeping at night. Please be aware that the remote device transmission sites are periodically monitored only during regular business hours during which simultaneous in-office device clinics are being run. If your transmission requires attention, we will contact you as soon as possible. Thank you.             Twan

## 2020-01-23 LAB — INR BLD: 2.7

## 2020-01-24 ENCOUNTER — ANTI-COAG VISIT (OUTPATIENT)
Dept: FAMILY MEDICINE CLINIC | Age: 75
End: 2020-01-24

## 2020-02-04 RX ORDER — BLOOD-GLUCOSE METER
1 EACH MISCELLANEOUS 4 TIMES DAILY
Qty: 1 DEVICE | Refills: 0 | Status: SHIPPED | OUTPATIENT
Start: 2020-02-04 | End: 2021-08-17

## 2020-02-04 NOTE — TELEPHONE ENCOUNTER
Pt advise and stated she needs new order for test strips to reflect that and a new glucose meter send to her pharmacy

## 2020-02-04 NOTE — TELEPHONE ENCOUNTER
Pt states that the number of times she should check her BS was changed from 4 to 3 times a day. Pt states 3 times a day is not enough and would like to have it changed back to 4 times a day. Please advise pt.

## 2020-02-05 ENCOUNTER — TELEPHONE (OUTPATIENT)
Dept: FAMILY MEDICINE CLINIC | Age: 75
End: 2020-02-05

## 2020-02-06 ENCOUNTER — ANTI-COAG VISIT (OUTPATIENT)
Dept: FAMILY MEDICINE CLINIC | Age: 75
End: 2020-02-06

## 2020-02-06 LAB — INR BLD: 4

## 2020-02-14 ENCOUNTER — ANTI-COAG VISIT (OUTPATIENT)
Dept: FAMILY MEDICINE CLINIC | Age: 75
End: 2020-02-14

## 2020-02-14 LAB — INR BLD: 2.1

## 2020-02-23 PROCEDURE — 93298 REM INTERROG DEV EVAL SCRMS: CPT | Performed by: INTERNAL MEDICINE

## 2020-02-23 PROCEDURE — G2066 INTER DEVC REMOTE 30D: HCPCS | Performed by: INTERNAL MEDICINE

## 2020-02-24 ENCOUNTER — NURSE ONLY (OUTPATIENT)
Dept: CARDIOLOGY CLINIC | Age: 75
End: 2020-02-24
Payer: MEDICARE

## 2020-02-24 NOTE — LETTER
[address] [facility] [phone]  [address] [facility],[address] [phone]    Pacemaker/Defibrillator Clinic          02/24/20        Eun Horn  [address]  [id number]        Dear Eun Horn    This letter is to inform you that we received the transmission from your monitor at home that checks your implanted heart device. The next date your monitor will automatically transmit will be 3-30-20. If your report needs attention we will notify you. Your device and monitor are wireless and most transmit cellularly, but please periodically check your monitor is still plugged in to the electrical outlet. If you still use the telephone land line to send please ensure the connection to the phone abel is secure. This will help to ensure successful automatic transmissions in the future. Also, the monitor needs to be close to you while sleeping at night. Please be aware that the remote device transmission sites are periodically monitored only during regular business hours during which simultaneous in-office device clinics are being run. If your transmission requires attention, we will contact you as soon as possible. Thank you.             [address]

## 2020-02-26 RX ORDER — MONTELUKAST SODIUM 10 MG/1
TABLET ORAL
Qty: 90 TABLET | Refills: 4 | Status: ON HOLD | OUTPATIENT
Start: 2020-02-26 | End: 2021-04-21

## 2020-02-26 RX ORDER — PREDNISONE 10 MG/1
TABLET ORAL
Qty: 100 TABLET | Refills: 4 | Status: SHIPPED | OUTPATIENT
Start: 2020-02-26 | End: 2020-05-22

## 2020-02-26 RX ORDER — WARFARIN SODIUM 5 MG/1
TABLET ORAL
Qty: 100 TABLET | Refills: 4 | Status: ON HOLD | OUTPATIENT
Start: 2020-02-26 | End: 2021-04-21

## 2020-02-28 ENCOUNTER — ANTI-COAG VISIT (OUTPATIENT)
Dept: FAMILY MEDICINE CLINIC | Age: 75
End: 2020-02-28

## 2020-02-28 ENCOUNTER — TELEPHONE (OUTPATIENT)
Dept: FAMILY MEDICINE CLINIC | Age: 75
End: 2020-02-28

## 2020-02-28 LAB — INR BLD: 4.2

## 2020-03-09 ENCOUNTER — TELEPHONE (OUTPATIENT)
Dept: CARDIOLOGY CLINIC | Age: 75
End: 2020-03-09

## 2020-03-12 LAB — INR BLD: 3.8

## 2020-03-12 RX ORDER — ALBUTEROL SULFATE 90 UG/1
2 AEROSOL, METERED RESPIRATORY (INHALATION) EVERY 6 HOURS PRN
Qty: 3 INHALER | Refills: 0 | Status: SHIPPED | OUTPATIENT
Start: 2020-03-12 | End: 2020-11-17

## 2020-03-12 NOTE — TELEPHONE ENCOUNTER
insulin glargine (LANTUS) 100 UNIT/ML injection pen 30 pen 3 2019     Si unit AM and 24 unit bedtime      albuterol sulfate HFA (PROAIR HFA) 108 (90 BASE) MCG/ACT inhaler 3 Inhaler 3 1/3/2017     Sig - Route: Inhale 2 puffs into the lungs every 6 hours as needed for Wheezing       Pharmacy:  [facility] Johanna Shea, [address] [place] [address] [phone]

## 2020-03-13 ENCOUNTER — ANTI-COAG VISIT (OUTPATIENT)
Dept: FAMILY MEDICINE CLINIC | Age: 75
End: 2020-03-13

## 2020-03-20 ENCOUNTER — TELEPHONE (OUTPATIENT)
Dept: CARDIOLOGY CLINIC | Age: 75
End: 2020-03-20

## 2020-03-26 RX ORDER — ONDANSETRON 4 MG/1
4 TABLET, FILM COATED ORAL 3 TIMES DAILY PRN
Qty: 30 TABLET | Refills: 0 | Status: SHIPPED | OUTPATIENT
Start: 2020-03-26

## 2020-03-27 ENCOUNTER — ANTI-COAG VISIT (OUTPATIENT)
Dept: FAMILY MEDICINE CLINIC | Age: 75
End: 2020-03-27

## 2020-03-27 ENCOUNTER — TELEPHONE (OUTPATIENT)
Dept: CARDIOLOGY CLINIC | Age: 75
End: 2020-03-27

## 2020-03-27 LAB — INR BLD: 3.7

## 2020-03-27 NOTE — TELEPHONE ENCOUNTER
Call placed to Brennan Ayala. Brennan Ayala is feeling ok and would like to reschedule her appointment and device check. I also gave her the number to reschedule her carotid doppler in the next month or so. She does not want to come in for anything at this time.

## 2020-03-30 ENCOUNTER — NURSE ONLY (OUTPATIENT)
Dept: CARDIOLOGY CLINIC | Age: 75
End: 2020-03-30
Payer: MEDICARE

## 2020-03-30 PROCEDURE — G2066 INTER DEVC REMOTE 30D: HCPCS | Performed by: INTERNAL MEDICINE

## 2020-03-30 PROCEDURE — 93298 REM INTERROG DEV EVAL SCRMS: CPT | Performed by: INTERNAL MEDICINE

## 2020-03-30 NOTE — PROGRESS NOTES
[employer] remote Linq report shows no arrhythmias. AF recordings are not real. These all are sinus rhythm with ectopy. We will continue to monitor remotely.

## 2020-04-09 ENCOUNTER — ANTI-COAG VISIT (OUTPATIENT)
Dept: FAMILY MEDICINE CLINIC | Age: 75
End: 2020-04-09

## 2020-04-09 LAB — INR BLD: 1.8

## 2020-04-10 RX ORDER — METHOCARBAMOL 750 MG/1
750 TABLET, FILM COATED ORAL 4 TIMES DAILY PRN
Qty: 40 TABLET | Refills: 1 | Status: SHIPPED | OUTPATIENT
Start: 2020-04-10 | End: 2020-05-22 | Stop reason: DRUGHIGH

## 2020-04-23 ENCOUNTER — ANTI-COAG VISIT (OUTPATIENT)
Dept: FAMILY MEDICINE CLINIC | Age: 75
End: 2020-04-23

## 2020-04-23 LAB — INR BLD: 2.1

## 2020-04-29 RX ORDER — LANCETS 28 GAUGE
1 EACH MISCELLANEOUS 4 TIMES DAILY
Qty: 200 EACH | Refills: 5 | Status: SHIPPED | OUTPATIENT
Start: 2020-04-29 | End: 2021-09-07 | Stop reason: SDUPTHER

## 2020-05-07 ENCOUNTER — ANTI-COAG VISIT (OUTPATIENT)
Dept: FAMILY MEDICINE CLINIC | Age: 75
End: 2020-05-07

## 2020-05-07 LAB — INR BLD: 2

## 2020-05-08 ENCOUNTER — HOSPITAL ENCOUNTER (EMERGENCY)
Age: 75
Discharge: HOME | DRG: 394 | End: 2020-05-09
Attending: EMERGENCY MEDICINE
Payer: MEDICARE

## 2020-05-08 PROCEDURE — 99284 EMERGENCY DEPT VISIT MOD MDM: CPT

## 2020-05-08 RX ORDER — POLYETHYLENE GLYCOL 3350 17 G/17G
17 POWDER, FOR SOLUTION ORAL DAILY
COMMUNITY

## 2020-05-08 RX ORDER — OMEPRAZOLE 20 MG/1
20 CAPSULE, DELAYED RELEASE ORAL DAILY
COMMUNITY

## 2020-05-08 ASSESSMENT — PAIN DESCRIPTION - ORIENTATION: ORIENTATION: MID

## 2020-05-08 ASSESSMENT — PAIN DESCRIPTION - DESCRIPTORS: DESCRIPTORS: CRAMPING

## 2020-05-08 ASSESSMENT — PAIN DESCRIPTION - PROGRESSION: CLINICAL_PROGRESSION: GRADUALLY WORSENING

## 2020-05-08 ASSESSMENT — PAIN DESCRIPTION - LOCATION: LOCATION: BACK

## 2020-05-08 ASSESSMENT — PAIN SCALES - GENERAL: PAINLEVEL_OUTOF10: 10

## 2020-05-08 ASSESSMENT — PAIN DESCRIPTION - DIRECTION: RADIATING_TOWARDS: RIGHT SIDE

## 2020-05-09 ENCOUNTER — APPOINTMENT (OUTPATIENT)
Dept: GENERAL RADIOLOGY | Age: 75
DRG: 394 | End: 2020-05-09
Payer: MEDICARE

## 2020-05-09 ENCOUNTER — TELEPHONE (OUTPATIENT)
Dept: FAMILY MEDICINE CLINIC | Age: 75
End: 2020-05-09

## 2020-05-09 ENCOUNTER — APPOINTMENT (OUTPATIENT)
Dept: CT IMAGING | Age: 75
DRG: 394 | End: 2020-05-09
Payer: MEDICARE

## 2020-05-09 VITALS
BODY MASS INDEX: 30.31 KG/M2 | HEIGHT: 68 IN | OXYGEN SATURATION: 95 % | TEMPERATURE: 97.7 F | SYSTOLIC BLOOD PRESSURE: 116 MMHG | HEART RATE: 110 BPM | DIASTOLIC BLOOD PRESSURE: 74 MMHG | RESPIRATION RATE: 20 BRPM | WEIGHT: 200 LBS

## 2020-05-09 LAB
A/G RATIO: 0.9 (ref 1.1–2.2)
ALBUMIN SERPL-MCNC: 3.2 G/DL (ref 3.4–5)
ALP BLD-CCNC: 144 U/L (ref 40–129)
ALT SERPL-CCNC: 17 U/L (ref 10–40)
ANION GAP SERPL CALCULATED.3IONS-SCNC: 11 MMOL/L (ref 3–16)
AST SERPL-CCNC: 20 U/L (ref 15–37)
BASOPHILS ABSOLUTE: 0 K/UL (ref 0–0.2)
BASOPHILS RELATIVE PERCENT: 0.2 %
BILIRUB SERPL-MCNC: 0.5 MG/DL (ref 0–1)
BUN BLDV-MCNC: 39 MG/DL (ref 7–20)
CALCIUM SERPL-MCNC: 9.3 MG/DL (ref 8.3–10.6)
CHLORIDE BLD-SCNC: 104 MMOL/L (ref 99–110)
CO2: 22 MMOL/L (ref 21–32)
CREAT SERPL-MCNC: 1.4 MG/DL (ref 0.6–1.2)
EKG ATRIAL RATE: 141 BPM
EKG DIAGNOSIS: NORMAL
EKG Q-T INTERVAL: 326 MS
EKG QRS DURATION: 86 MS
EKG QTC CALCULATION (BAZETT): 460 MS
EKG R AXIS: 87 DEGREES
EKG T AXIS: 89 DEGREES
EKG VENTRICULAR RATE: 120 BPM
EOSINOPHILS ABSOLUTE: 0.1 K/UL (ref 0–0.6)
EOSINOPHILS RELATIVE PERCENT: 0.8 %
GFR AFRICAN AMERICAN: 45
GFR NON-AFRICAN AMERICAN: 37
GLOBULIN: 3.6 G/DL
GLUCOSE BLD-MCNC: 213 MG/DL (ref 70–99)
HCT VFR BLD CALC: 36.7 % (ref 36–48)
HEMOGLOBIN: 11.8 G/DL (ref 12–16)
INR BLD: 2.6 (ref 0.86–1.14)
LYMPHOCYTES ABSOLUTE: 1 K/UL (ref 1–5.1)
LYMPHOCYTES RELATIVE PERCENT: 12.7 %
MCH RBC QN AUTO: 26.8 PG (ref 26–34)
MCHC RBC AUTO-ENTMCNC: 32.2 G/DL (ref 31–36)
MCV RBC AUTO: 83.4 FL (ref 80–100)
MONOCYTES ABSOLUTE: 1 K/UL (ref 0–1.3)
MONOCYTES RELATIVE PERCENT: 11.9 %
NEUTROPHILS ABSOLUTE: 6 K/UL (ref 1.7–7.7)
NEUTROPHILS RELATIVE PERCENT: 74.4 %
PDW BLD-RTO: 17.1 % (ref 12.4–15.4)
PLATELET # BLD: 263 K/UL (ref 135–450)
PMV BLD AUTO: 8.2 FL (ref 5–10.5)
POTASSIUM REFLEX MAGNESIUM: 4.3 MMOL/L (ref 3.5–5.1)
PRO-BNP: 6796 PG/ML (ref 0–124)
PROTHROMBIN TIME: 30.4 SEC (ref 10–13.2)
RBC # BLD: 4.4 M/UL (ref 4–5.2)
SODIUM BLD-SCNC: 137 MMOL/L (ref 136–145)
TOTAL PROTEIN: 6.8 G/DL (ref 6.4–8.2)
TROPONIN: <0.01 NG/ML
WBC # BLD: 8.1 K/UL (ref 4–11)

## 2020-05-09 PROCEDURE — 83880 ASSAY OF NATRIURETIC PEPTIDE: CPT

## 2020-05-09 PROCEDURE — 80053 COMPREHEN METABOLIC PANEL: CPT

## 2020-05-09 PROCEDURE — 96374 THER/PROPH/DIAG INJ IV PUSH: CPT

## 2020-05-09 PROCEDURE — 93005 ELECTROCARDIOGRAM TRACING: CPT | Performed by: NURSE PRACTITIONER

## 2020-05-09 PROCEDURE — 93010 ELECTROCARDIOGRAM REPORT: CPT | Performed by: INTERNAL MEDICINE

## 2020-05-09 PROCEDURE — 2580000003 HC RX 258: Performed by: NURSE PRACTITIONER

## 2020-05-09 PROCEDURE — 96376 TX/PRO/DX INJ SAME DRUG ADON: CPT

## 2020-05-09 PROCEDURE — 6360000002 HC RX W HCPCS: Performed by: NURSE PRACTITIONER

## 2020-05-09 PROCEDURE — 36415 COLL VENOUS BLD VENIPUNCTURE: CPT

## 2020-05-09 PROCEDURE — 85610 PROTHROMBIN TIME: CPT

## 2020-05-09 PROCEDURE — 6360000004 HC RX CONTRAST MEDICATION: Performed by: NURSE PRACTITIONER

## 2020-05-09 PROCEDURE — 71045 X-RAY EXAM CHEST 1 VIEW: CPT

## 2020-05-09 PROCEDURE — 84484 ASSAY OF TROPONIN QUANT: CPT

## 2020-05-09 PROCEDURE — 71260 CT THORAX DX C+: CPT

## 2020-05-09 PROCEDURE — 85025 COMPLETE CBC W/AUTO DIFF WBC: CPT

## 2020-05-09 RX ORDER — 0.9 % SODIUM CHLORIDE 0.9 %
1000 INTRAVENOUS SOLUTION INTRAVENOUS ONCE
Status: COMPLETED | OUTPATIENT
Start: 2020-05-09 | End: 2020-05-09

## 2020-05-09 RX ORDER — HYDROMORPHONE HYDROCHLORIDE 1 MG/ML
0.5 INJECTION, SOLUTION INTRAMUSCULAR; INTRAVENOUS; SUBCUTANEOUS ONCE
Status: COMPLETED | OUTPATIENT
Start: 2020-05-09 | End: 2020-05-09

## 2020-05-09 RX ADMIN — HYDROMORPHONE HYDROCHLORIDE 0.5 MG: 1 INJECTION, SOLUTION INTRAMUSCULAR; INTRAVENOUS; SUBCUTANEOUS at 00:48

## 2020-05-09 RX ADMIN — SODIUM CHLORIDE 1000 ML: 9 INJECTION, SOLUTION INTRAVENOUS at 04:20

## 2020-05-09 RX ADMIN — IOPAMIDOL 75 ML: 755 INJECTION, SOLUTION INTRAVENOUS at 01:43

## 2020-05-09 RX ADMIN — SODIUM CHLORIDE 1000 ML: 9 INJECTION, SOLUTION INTRAVENOUS at 00:48

## 2020-05-09 RX ADMIN — HYDROMORPHONE HYDROCHLORIDE 0.5 MG: 1 INJECTION, SOLUTION INTRAMUSCULAR; INTRAVENOUS; SUBCUTANEOUS at 04:21

## 2020-05-09 ASSESSMENT — PAIN SCALES - GENERAL
PAINLEVEL_OUTOF10: 10
PAINLEVEL_OUTOF10: 9

## 2020-05-09 NOTE — ED NOTES
Nursing Discharge Notes:    -Patient discharged at this time in no acute distress after verbalizing understanding of discharge instructions and need for follow up with PCP and/or specialist if one was referred.  -A copy of the AVS was reviewed with pt and/or family.  -Pt received applicable scripts which were reviewed with pt and/or family by this RN. -Pt was given the opportunity to ask questions before signing for discharge. Patient Mobility at Discharge:    -Pt left ambulatory to lobby / discharge area. -Wheelchair offered and accepted. Patient taken to her  in waiting room. Patient Education:    Learner - Patient and/or family / caregiver. Motivation and Readiness To Learn - Medium to High  Barriers To Learning - None  Learning Preference / Provided Instructions - Both written and verbal discharge instructions.      River Nunn RN  05/09/20 [id number]

## 2020-05-09 NOTE — TELEPHONE ENCOUNTER
Pt daughter Loretta Batista called about her mother. She wanted information about what was seen/ done in ED. I called pt and told her about CT results, looks like she has hx PE, PE seen on current CT but ? Old, INR therapeutic. Also noted free air again, looks like pt has had this several times in past. Advised daughter of this, daughter states that pt is still in pain and in past has been admitted for this. Discussed with daughter that I did not see pt but ED felt was ok to go home, if she still is in that much pain needs to go back to ED    Daughter also states she wants noted in chart that pt has not been having her INR managed, that no one has called her or adjusted her dose. Discussed with daughter that the monitoring reports are in the chart monthly and documented that we have been calling her and that her dose was last adjusted in March. Daughter states that this is not true, no one is calling. I told daughter I am sorry but but I don't know the patients, can only see that it looks like we get the reports regularly and it looks like someone is calling her. Again told her if pt is that much pain she needs to go back to the ED. Daughter hung up on me. I called patient's , he says she was feeling much better and not really having any pain at all since last night. I made appt for f/u with  on Monday at [facility] 8384.

## 2020-05-09 NOTE — ED PROVIDER NOTES
trauma, accidents, injuries, or falls. She denies smoking, etoh, or drug use. Nursing Notes were all reviewed and agreed with or any disagreements were addressed in the HPI. REVIEW OF SYSTEMS    (2-9 systems for level 4, 10 or more for level 5)     Review of Systems    Positives and Pertinent negatives as per HPI. Except as noted above in the ROS, all other systems were reviewed and negative.        PAST MEDICAL HISTORY     Past Medical History:   Diagnosis Date    Asthma     Atrial fibrillation (HCC)     Eosinophilia     Hemoptysis     HIGH CHOLESTEROL     Hx of blood clots     Hypertension     Irregular heart beat     Other specified gastritis without mention of hemorrhage     Palpitations     Skin cancer     basal and squamous    Type II or unspecified type diabetes mellitus without mention of complication, not stated as uncontrolled          SURGICAL HISTORY     Past Surgical History:   Procedure Laterality Date    BRONCHOSCOPY  07/18/2016    Dr. Laurie Delgado - brushings from [address] [place] [address],[facility] [place]-10  08/16/2018    Dr. Ruby Cruz  02/07/2017    Dr. Roberto Vaca - sigmoid diverticulosis, polypectomies x3    COLONOSCOPY  01/17/2014    Dr. Roberto Vaca - sigmoid diverticulosis, polypectomies x3, internal hemorrhoids    COLONOSCOPY  10/10/2018    w/biopsy performed by Kay Myrick MD at [address]  08/21/2018    Dr. Shashi Mcmahan - x3 (LIMA-LAD, L SV-D1-PLV) modified BL MAZE procedure w/obliteration of WAYNE using 45mm AtriClip    HYSTERECTOMY      INSERTABLE CARDIAC MONITOR Left 08/16/2018    Dr. Alam Jones - [employer] Brian SN# [id number] Medtronic    MITRAL VALVE REPLACEMENT  08/21/2018    Dr. Shashi Mcmahan - 27mm Medtronic Cinch tissue valve    SKIN BIOPSY      TRANSESOPHAGEAL ECHOCARDIOGRAM  08/21/2018    during CABG/MVR    TUNNELED VENOUS CATHETER PLACEMENT Left 08/23/2018    Dr. Jb Espana - RAVINDRA for HD    UPPER 18-24 months. In a high-risk patient, CT at 6-12 months, then CT at 18-24 months. Nodule size greater than 8 mm         In a low-risk patient, consider CT at 3 months, PET/CT, or tissue sampling. In a high-risk patient, consider CT at 3 months, PET/CT, or tissue sampling. Multiple Solid Nodules:      Nodule size less than 6 mm   In a low-risk patient, no routine follow-up. In a high-risk patient, optional CT at 12 months. Nodule size equals 6-8 mm   In a low-risk patient, CT at 3-6 months, then consider CT at 18-24 months. In a high-risk patient, CT at 3-6 months, then CT at 18-24 months. Nodule size greater than 8 mm   In a low-risk patient, CT at 3-6 months, then consider CT at 18-24 months. In a high-risk patient, CT at 3-6 months, then CT at 18-24 months. - Low risk patients include individuals with minimal or absent history of   smoking and other known risk factors. - High risk patients include individuals with a history or smoking or known   risk factors. Radiology 2017 http://pubs. rsna.org/doi/full/10.1148/radiol. 0763334471         XR CHEST PORTABLE   Final Result   No acute disease. No results found.         PROCEDURES   Unless otherwise noted below, none     Procedures    CRITICAL CARE TIME   N/A    CONSULTS:  None      EMERGENCY DEPARTMENT COURSE and DIFFERENTIAL DIAGNOSIS/MDM:   Vitals:    Vitals:    05/09/20 0001 05/09/20 0030 05/09/20 0100 05/09/20 0130   BP: 133/84 (!) 132/96 121/79 120/75   Pulse:  128 125 119   Resp:       Temp:       TempSrc:       SpO2: 97% 95% 93% 95%   Weight:       Height:           Patient was given the following medications:  Medications   0.9 % sodium chloride bolus (has no administration in time range)   0.9 % sodium chloride bolus (1,000 mLs Intravenous New Bag 5/9/20 0048)   HYDROmorphone HCl PF (DILAUDID) injection 0.5 mg (0.5 mg Intravenous Given 5/9/20 0048)   iopamidol (ISOVUE-370) 76 % injection 75 mL (75 mLs Intravenous Given 5/9/20 0143)       Pertinent Labs & Imaging studies reviewed. (See chart for details)   -  Patient seen and evaluated in the emergency department. -  Triage and nursing notes reviewed and incorporated. -  Old chart records reviewed and incorporated. -  Patient case discussed with attending physician, Dr. Gladis Massey. They saw and examined patient. -  Differential diagnosis includes:  Pneumonia, CHF, PE, MI, versus COVID-19  -  Work-up included:  See above CXR, CT chest pulmonary embolism, EKG, INR, CBC, CMP, troponin, BNP, UA  -  ED treatment included:   Normal saline, Dilaudid  - Consults: none  -  Results discussed with patient. Labs show  CBC with hemoglobin 11.8, RDW 17.1. CMP with glucose 213, BUN 39, creatinine 1.4, albumin 3.2, alk phos 144. Troponin negative. BNP [id number]. CT chest pulmonary embolism shows small intramural luminal thrombi identified involving the right lower lobe worrisome for pulmonary emboli. Given appearance these may be chronic in appearance, however clinical correlation is strongly recommended. Trace free fluid beneath the hemidiaphragms. Questionable pneumatosis involving the bowel loops. Please correlate exam findings. Large of the main pulmonary artery worrisome for pulmonary arterial hypertension. Right lower lobe nodule unchanged from prior exam.  CXR shows no acute disease. The patient is agreeable with plan of care and disposition.  -  Disposition:  pt care turned over to Dr. Natty Craig pending results and dispo. She is at her pain is a little bit more tolerable, however she still feels uncomfortable. Ask for additional pain medicine. FINAL IMPRESSION      1. TORIBIO (acute kidney injury) ([facility] [address]    2. Pulmonary nodule    3.  Pulmonary hypertension (HCC)          DISPOSITION/PLAN   DISPOSITION      (Please note that portions of this note were completed with a voice recognition program.  Efforts were made to edit the dictations but occasionally

## 2020-05-10 ENCOUNTER — APPOINTMENT (OUTPATIENT)
Dept: GENERAL RADIOLOGY | Age: 75
DRG: 394 | End: 2020-05-10
Payer: MEDICARE

## 2020-05-10 ENCOUNTER — HOSPITAL ENCOUNTER (INPATIENT)
Age: 75
LOS: 8 days | Discharge: HOME | DRG: 394 | End: 2020-05-18
Attending: EMERGENCY MEDICINE | Admitting: INTERNAL MEDICINE
Payer: MEDICARE

## 2020-05-10 ENCOUNTER — APPOINTMENT (OUTPATIENT)
Dept: CT IMAGING | Age: 75
DRG: 394 | End: 2020-05-10
Payer: MEDICARE

## 2020-05-10 PROBLEM — K63.89 PNEUMATOSIS INTESTINALIS: Status: ACTIVE | Noted: 2020-05-10

## 2020-05-10 PROBLEM — K63.89 PNEUMATOSIS COLI: Status: ACTIVE | Noted: 2020-05-10

## 2020-05-10 LAB
A/G RATIO: 0.9 (ref 1.1–2.2)
ALBUMIN SERPL-MCNC: 3.3 G/DL (ref 3.4–5)
ALP BLD-CCNC: 131 U/L (ref 40–129)
ALT SERPL-CCNC: 22 U/L (ref 10–40)
ANION GAP SERPL CALCULATED.3IONS-SCNC: 10 MMOL/L (ref 3–16)
APTT: 33.9 SEC (ref 24.2–36.2)
AST SERPL-CCNC: 22 U/L (ref 15–37)
BASOPHILS ABSOLUTE: 0 K/UL (ref 0–0.2)
BASOPHILS RELATIVE PERCENT: 0.2 %
BILIRUB SERPL-MCNC: 0.4 MG/DL (ref 0–1)
BUN BLDV-MCNC: 36 MG/DL (ref 7–20)
CALCIUM SERPL-MCNC: 9 MG/DL (ref 8.3–10.6)
CHLORIDE BLD-SCNC: 105 MMOL/L (ref 99–110)
CO2: 23 MMOL/L (ref 21–32)
CREAT SERPL-MCNC: 1.3 MG/DL (ref 0.6–1.2)
EOSINOPHILS ABSOLUTE: 0 K/UL (ref 0–0.6)
EOSINOPHILS RELATIVE PERCENT: 0.4 %
GFR AFRICAN AMERICAN: 49
GFR NON-AFRICAN AMERICAN: 40
GLOBULIN: 3.5 G/DL
GLUCOSE BLD-MCNC: 119 MG/DL (ref 70–99)
HCT VFR BLD CALC: 37.9 % (ref 36–48)
HEMOGLOBIN: 11.9 G/DL (ref 12–16)
INR BLD: 2.75 (ref 0.86–1.14)
LACTIC ACID, SEPSIS: 1.5 MMOL/L (ref 0.4–1.9)
LIPASE: 35 U/L (ref 13–60)
LYMPHOCYTES ABSOLUTE: 1.5 K/UL (ref 1–5.1)
LYMPHOCYTES RELATIVE PERCENT: 20.7 %
MCH RBC QN AUTO: 26.9 PG (ref 26–34)
MCHC RBC AUTO-ENTMCNC: 31.5 G/DL (ref 31–36)
MCV RBC AUTO: 85.6 FL (ref 80–100)
MONOCYTES ABSOLUTE: 1.1 K/UL (ref 0–1.3)
MONOCYTES RELATIVE PERCENT: 15.5 %
NEUTROPHILS ABSOLUTE: 4.5 K/UL (ref 1.7–7.7)
NEUTROPHILS RELATIVE PERCENT: 63.2 %
PDW BLD-RTO: 17.4 % (ref 12.4–15.4)
PLATELET # BLD: 269 K/UL (ref 135–450)
PMV BLD AUTO: 8.6 FL (ref 5–10.5)
POTASSIUM SERPL-SCNC: 4.4 MMOL/L (ref 3.5–5.1)
PRO-BNP: 9318 PG/ML (ref 0–124)
PROTHROMBIN TIME: 32.2 SEC (ref 10–13.2)
RBC # BLD: 4.43 M/UL (ref 4–5.2)
SODIUM BLD-SCNC: 138 MMOL/L (ref 136–145)
TOTAL PROTEIN: 6.8 G/DL (ref 6.4–8.2)
TROPONIN: <0.01 NG/ML
WBC # BLD: 7.1 K/UL (ref 4–11)

## 2020-05-10 PROCEDURE — 83605 ASSAY OF LACTIC ACID: CPT

## 2020-05-10 PROCEDURE — 1200000000 HC SEMI PRIVATE

## 2020-05-10 PROCEDURE — 93005 ELECTROCARDIOGRAM TRACING: CPT | Performed by: EMERGENCY MEDICINE

## 2020-05-10 PROCEDURE — 83880 ASSAY OF NATRIURETIC PEPTIDE: CPT

## 2020-05-10 PROCEDURE — 2580000003 HC RX 258: Performed by: INTERNAL MEDICINE

## 2020-05-10 PROCEDURE — 71045 X-RAY EXAM CHEST 1 VIEW: CPT

## 2020-05-10 PROCEDURE — 85610 PROTHROMBIN TIME: CPT

## 2020-05-10 PROCEDURE — 74177 CT ABD & PELVIS W/CONTRAST: CPT

## 2020-05-10 PROCEDURE — 80053 COMPREHEN METABOLIC PANEL: CPT

## 2020-05-10 PROCEDURE — 96376 TX/PRO/DX INJ SAME DRUG ADON: CPT

## 2020-05-10 PROCEDURE — 85025 COMPLETE CBC W/AUTO DIFF WBC: CPT

## 2020-05-10 PROCEDURE — 6360000002 HC RX W HCPCS: Performed by: PHYSICIAN ASSISTANT

## 2020-05-10 PROCEDURE — 84484 ASSAY OF TROPONIN QUANT: CPT

## 2020-05-10 PROCEDURE — 96374 THER/PROPH/DIAG INJ IV PUSH: CPT

## 2020-05-10 PROCEDURE — 96375 TX/PRO/DX INJ NEW DRUG ADDON: CPT

## 2020-05-10 PROCEDURE — 93298 REM INTERROG DEV EVAL SCRMS: CPT | Performed by: INTERNAL MEDICINE

## 2020-05-10 PROCEDURE — 36415 COLL VENOUS BLD VENIPUNCTURE: CPT

## 2020-05-10 PROCEDURE — 83690 ASSAY OF LIPASE: CPT

## 2020-05-10 PROCEDURE — 85730 THROMBOPLASTIN TIME PARTIAL: CPT

## 2020-05-10 PROCEDURE — 6360000004 HC RX CONTRAST MEDICATION: Performed by: PHYSICIAN ASSISTANT

## 2020-05-10 PROCEDURE — 99285 EMERGENCY DEPT VISIT HI MDM: CPT

## 2020-05-10 PROCEDURE — G2066 INTER DEVC REMOTE 30D: HCPCS | Performed by: INTERNAL MEDICINE

## 2020-05-10 PROCEDURE — 2580000003 HC RX 258: Performed by: PHYSICIAN ASSISTANT

## 2020-05-10 RX ORDER — SODIUM CHLORIDE 0.9 % (FLUSH) 0.9 %
10 SYRINGE (ML) INJECTION PRN
Status: DISCONTINUED | OUTPATIENT
Start: 2020-05-10 | End: 2020-05-18 | Stop reason: HOSPADM

## 2020-05-10 RX ORDER — POTASSIUM CHLORIDE 7.45 MG/ML
10 INJECTION INTRAVENOUS PRN
Status: DISCONTINUED | OUTPATIENT
Start: 2020-05-10 | End: 2020-05-18 | Stop reason: HOSPADM

## 2020-05-10 RX ORDER — 0.9 % SODIUM CHLORIDE 0.9 %
500 INTRAVENOUS SOLUTION INTRAVENOUS ONCE
Status: COMPLETED | OUTPATIENT
Start: 2020-05-10 | End: 2020-05-10

## 2020-05-10 RX ORDER — SODIUM CHLORIDE, SODIUM LACTATE, POTASSIUM CHLORIDE, CALCIUM CHLORIDE 600; 310; 30; 20 MG/100ML; MG/100ML; MG/100ML; MG/100ML
INJECTION, SOLUTION INTRAVENOUS CONTINUOUS
Status: DISCONTINUED | OUTPATIENT
Start: 2020-05-10 | End: 2020-05-11

## 2020-05-10 RX ORDER — HYDROMORPHONE HYDROCHLORIDE 1 MG/ML
1 INJECTION, SOLUTION INTRAMUSCULAR; INTRAVENOUS; SUBCUTANEOUS ONCE
Status: COMPLETED | OUTPATIENT
Start: 2020-05-10 | End: 2020-05-10

## 2020-05-10 RX ORDER — SODIUM CHLORIDE 0.9 % (FLUSH) 0.9 %
10 SYRINGE (ML) INJECTION EVERY 12 HOURS SCHEDULED
Status: DISCONTINUED | OUTPATIENT
Start: 2020-05-10 | End: 2020-05-18 | Stop reason: HOSPADM

## 2020-05-10 RX ORDER — CARVEDILOL 6.25 MG/1
6.25 TABLET ORAL 2 TIMES DAILY WITH MEALS
Status: DISCONTINUED | OUTPATIENT
Start: 2020-05-11 | End: 2020-05-18 | Stop reason: HOSPADM

## 2020-05-10 RX ORDER — ASPIRIN 81 MG/1
81 TABLET, CHEWABLE ORAL DAILY
Status: DISCONTINUED | OUTPATIENT
Start: 2020-05-11 | End: 2020-05-18 | Stop reason: HOSPADM

## 2020-05-10 RX ORDER — ONDANSETRON 2 MG/ML
4 INJECTION INTRAMUSCULAR; INTRAVENOUS ONCE
Status: COMPLETED | OUTPATIENT
Start: 2020-05-10 | End: 2020-05-10

## 2020-05-10 RX ORDER — 0.9 % SODIUM CHLORIDE 0.9 %
1000 INTRAVENOUS SOLUTION INTRAVENOUS ONCE
Status: DISCONTINUED | OUTPATIENT
Start: 2020-05-10 | End: 2020-05-10

## 2020-05-10 RX ORDER — MAGNESIUM SULFATE IN WATER 40 MG/ML
2 INJECTION, SOLUTION INTRAVENOUS PRN
Status: DISCONTINUED | OUTPATIENT
Start: 2020-05-10 | End: 2020-05-18 | Stop reason: HOSPADM

## 2020-05-10 RX ORDER — ONDANSETRON 2 MG/ML
4 INJECTION INTRAMUSCULAR; INTRAVENOUS EVERY 6 HOURS PRN
Status: DISCONTINUED | OUTPATIENT
Start: 2020-05-10 | End: 2020-05-18 | Stop reason: HOSPADM

## 2020-05-10 RX ADMIN — SODIUM CHLORIDE 500 ML: 9 INJECTION, SOLUTION INTRAVENOUS at 18:16

## 2020-05-10 RX ADMIN — HYDROMORPHONE HYDROCHLORIDE 1 MG: 1 INJECTION, SOLUTION INTRAMUSCULAR; INTRAVENOUS; SUBCUTANEOUS at 20:18

## 2020-05-10 RX ADMIN — IOPAMIDOL 75 ML: 755 INJECTION, SOLUTION INTRAVENOUS at 18:54

## 2020-05-10 RX ADMIN — SODIUM CHLORIDE, POTASSIUM CHLORIDE, SODIUM LACTATE AND CALCIUM CHLORIDE: 600; 310; 30; 20 INJECTION, SOLUTION INTRAVENOUS at 23:59

## 2020-05-10 RX ADMIN — HYDROMORPHONE HYDROCHLORIDE 1 MG: 1 INJECTION, SOLUTION INTRAMUSCULAR; INTRAVENOUS; SUBCUTANEOUS at 18:17

## 2020-05-10 RX ADMIN — ONDANSETRON 4 MG: 2 INJECTION INTRAMUSCULAR; INTRAVENOUS at 18:17

## 2020-05-10 ASSESSMENT — ENCOUNTER SYMPTOMS
NAUSEA: 0
RHINORRHEA: 0
DIARRHEA: 0
COUGH: 0
BACK PAIN: 1
VOMITING: 0
SHORTNESS OF BREATH: 0
ABDOMINAL PAIN: 0

## 2020-05-10 ASSESSMENT — PAIN SCALES - GENERAL
PAINLEVEL_OUTOF10: 0
PAINLEVEL_OUTOF10: 9
PAINLEVEL_OUTOF10: 9
PAINLEVEL_OUTOF10: 0

## 2020-05-10 ASSESSMENT — PAIN DESCRIPTION - PAIN TYPE: TYPE: ACUTE PAIN

## 2020-05-10 NOTE — ED NOTES
3 IV's attempted without success. Writer able to place US IV, 18g R FA without complications.      Matteo Andre, NAZANIN  05/10/20 6205

## 2020-05-10 NOTE — ED NOTES
Pt alert and oriented x3, vitals WNL, lung sounds diminished right lower lobe, clear on left, respirations are labored, use of accessory muscles noted. No pursed lip breathing present, pt unable to lay flat. Pt able to speak in full sentences. Skin is warm, dry, and temp WNL. S1 and S2 heart sounds heard. Pt reports she had a scan done 2 weeks ago and was told that she had blood clots.      Abdulkadir Dennis RN  05/10/20 6355

## 2020-05-10 NOTE — ED PROVIDER NOTES
[facility]    CORONARY ARTERY BYPASS GRAFT  08/21/2018    Dr. Ree Hung - x3 (LIMA-LAD, L SV-D1-PLV) modified BL MAZE procedure w/obliteration of WAYNE using 45mm AtriClip    HYSTERECTOMY      INSERTABLE CARDIAC MONITOR Left 08/16/2018    Dr. Moses Malik - Chika Fortune SN# [id number] Medtronic    MITRAL VALVE REPLACEMENT  08/21/2018    Dr. Ree Hung - 27mm Medtronic Cinch tissue valve    SKIN BIOPSY      TRANSESOPHAGEAL ECHOCARDIOGRAM  08/21/2018    during CABG/MVR    TUNNELED VENOUS CATHETER PLACEMENT Left 08/23/2018    Dr. Christene Barthel - IJ for HD    UPPER GASTROINTESTINAL ENDOSCOPY N/A 10/10/2018    w/biopsy performed by Heamlatha Nguyen MD at [address]       Previous Medications    ALBUTEROL SULFATE HFA (PROAIR HFA) 108 (90 BASE) MCG/ACT INHALER    Inhale 2 puffs into the lungs every 6 hours as needed for Wheezing    ASPIRIN 81 MG CHEWABLE TABLET    Take 1 tablet by mouth daily    BD PEN NEEDLE JANNET U/F 32G X 4 MM MISC    USE 1 PEN NEEDLE FOUR TIMES A DAY    BLOOD GLUCOSE MONITORING SUPPL (EMBRACE PRO GLUCOSE METER) GAETANO    1 Device by Does not apply route 4 times daily    BLOOD GLUCOSE MONITORING SUPPL (FREESTYLE LITE) GAETANO    1 Device by Does not apply route 4 times daily Patient to check blood sugar 4 times a day and PRN, she is treated with multiple daily injections of insulin that require correction dosing ;A1C 8.1 ; ICD code-E11.65 ,Z79.4    BLOOD GLUCOSE TEST STRIPS (FREESTYLE LITE) STRIP    Test 4 times daily    CARVEDILOL (COREG) 6.25 MG TABLET    Take 1 tablet by mouth 2 times daily (with meals)    EXENATIDE (BYETTA 10 MCG PEN) 10 MCG/0.04ML INJECTION    Inject 0.04 mLs into the skin 2 times daily (with meals)    FREESTYLE LANCETS MISC    1 each by Does not apply route 4 times daily    HUMALOG KWIKPEN 100 UNIT/ML SOPN    TAKE AS INSTRUCTED BY YOUR PRESCRIBER    INSULIN GLARGINE (LANTUS;BASAGLAR) 100 UNIT/ML INJECTION PEN    50 unit AM and 24 unit bedtime    METHOCARBAMOL (ROBAXIN-750) 750 MG TABLET    Take 1 tablet by mouth 4 times daily as needed (Back pain)    MONTELUKAST (SINGULAIR) 10 MG TABLET    TAKE 1 TABLET NIGHTLY    NYSTATIN (MYCOSTATIN) 101658 UNIT/ML SUSPENSION    TAKE ONE TEASPOONFUL BY MOUTH FOUR TIMES A DAY FOR 5 DAYS    OMEPRAZOLE (PRILOSEC) 20 MG DELAYED RELEASE CAPSULE    Take 20 mg by mouth daily    ONDANSETRON (ZOFRAN) 4 MG TABLET    Take 1 tablet by mouth 3 times daily as needed for Nausea or Vomiting    OXYCODONE HCL (OXYCONTIN PO)    Take 1 tablet by mouth See Admin Instructions Pt unsure of dosage and thinks it may be every 4 hours prn but isn't sure    POLYETHYLENE GLYCOL (GLYCOLAX) 17 GM/SCOOP POWDER    Take 17 g by mouth daily    POTASSIUM CHLORIDE (KLOR-CON M) 10 MEQ EXTENDED RELEASE TABLET    TAKE 1 TABLET DAILY    PREDNISONE (DELTASONE) 10 MG TABLET    TAKE 1 TABLET AS NEEDED (EOSINOPHILIC GASTRITIS)    ROSUVASTATIN (CRESTOR) 20 MG TABLET    TAKE 1 TABLET DAILY    SPIRONOLACTONE (ALDACTONE) 25 MG TABLET    TAKE 1 TABLET DAILY    TORSEMIDE (DEMADEX) 20 MG TABLET    TAKE 1 TABLET DAILY    VITAMIN B-12 500 MCG TABLET    Take 1 tablet by mouth daily    WARFARIN (COUMADIN) 5 MG TABLET    TAKE 1 TABLET DAILY         ALLERGIES     Lgxclcch-xxqqrct-fprdcl [fluocinolone]; Ciprofloxacin; Diovan [valsartan]; Flagyl [metronidazole]; Metformin and related [metformin and related];  Benazepril; Morphine; Saxagliptin; and Levaquin [levofloxacin]    FAMILYHISTORY       Family History   Problem Relation Age of Onset    Cancer Father     Asthma Mother     Hypertension Mother     Heart Disease Mother     High Blood Pressure Mother           SOCIAL HISTORY       Social History     Tobacco Use    Smoking status: Never Smoker    Smokeless tobacco: Never Used   Substance Use Topics    Alcohol use: No    Drug use: No       SCREENINGS             PHYSICAL EXAM    (up to 7 for level 4, 8 or more for level 5)     ED Triage Vitals [05/10/20 1650]   BP Temp Temp Source Pulse Glucose 119 (*)     BUN 36 (*)     CREATININE 1.3 (*)     GFR Non- 40 (*)     GFR  49 (*)     Alb 3.3 (*)     Albumin/Globulin Ratio 0.9 (*)     Alkaline Phosphatase 131 (*)     All other components within normal limits    Narrative:     Performed at:  [facility] [address], [address]   Phone [phone]   PROTIME-INR - Abnormal; Notable for the following components:    Protime 32.2 (*)     INR 2.75 (*)     All other components within normal limits    Narrative:     Performed at:  [facility] [address], [place]   Phone [phone]   BRAIN NATRIURETIC PEPTIDE - Abnormal; Notable for the following components:    Pro-BNP 9,318 (*)     All other components within normal limits    Narrative:     Performed at:  [facility] [address], [place]   Phone [phone]   LIPASE    Narrative:     Performed at:  [facility] [address], [place]   Phone [phone]   APTT    Narrative:     Performed at:  [facility]  [address], [place]   Phone [phone]   TROPONIN    Narrative:     Performed at:  [facility] [address], [place]   Phone [phone]   LACTATE, SEPSIS    Narrative:     Performed at:  [facility] [address], [place]   Phone [phone]   URINE RT REFLEX TO CULTURE   LACTATE, SEPSIS   CBC WITH AUTO DIFFERENTIAL   COMPREHENSIVE METABOLIC PANEL   PROTIME-INR   LACTIC ACID, PLASMA   MAGNESIUM   PHOSPHORUS   PREALBUMIN   APTT   TRANSFERRIN       All other labs were within normal range or not returned as of this dictation. EKG:  All EKG's are interpreted by the Emergency Department Physician in the absence of a cardiologist.  Please see their note for interpretation of EKG. RADIOLOGY:   Non-plain film images such as CT, Ultrasound and MRI are read by the radiologist. Plain radiographic images are visualized and preliminarily interpreted by the ED Provider with the below findings:        Interpretation per the Radiologist below, if available at the time of this note:    CT CHEST ABDOMEN PELVIS W CONTRAST   Final Result   Atelectasis in the lungs as well as scattered pulmonary nodules measuring up   to 5 mm. These are similar to those seen on April 24, 2019 study. No   further follow-up recommended. A few dilated loops of small bowel within the mid and upper abdomen measuring   up to a 4.3 cm. No discrete transition point visualized. Multiple loops of small bowel demonstrating pneumatosis within the mid and   upper abdomen. Additional clusters of gas within the mesentery adjacent to   bowel loops likely within small vessels. Findings were present in April of 2019 however they appear progressed since then. Plaque and calcification noted in the superior mesenteric artery just distal   to its take-off from the aorta with at least 50% narrowing of the SMA. It   otherwise appears patent. 7.5 x 5.7 cm simple left ovarian cyst which is unchanged since 1 year ago. Recommend surgical consultation given size. Critical results were called by Dr. Amish Landry MD to [facility] on   5/10/2020 at 19:56. XR CHEST PORTABLE   Final Result   Mild interstitial prominence throughout the lungs. Given cardiomegaly,   findings concerning for congestive heart failure and edema.            Xr Chest Portable    Result Date: 5/10/2020  EXAMINATION: ONE XRAY VIEW OF THE CHEST 5/10/2020 5:13 pm COMPARISON: May 9, 2020 HISTORY: ORDERING SYSTEM PROVIDED HISTORY: SOB TECHNOLOGIST PROVIDED HISTORY: Reason for exam:->SOB Reason for Exam: shortness of breath Acuity: Acute Type of Exam: Initial FINDINGS: high-risk patient, CT at 6-12 months, then CT at 18-24 months. Nodule size greater than 8 mm In a low-risk patient, consider CT at 3 months, PET/CT, or tissue sampling. In a high-risk patient, consider CT at 3 months, PET/CT, or tissue sampling. Multiple Solid Nodules: Nodule size less than 6 mm In a low-risk patient, no routine follow-up. In a high-risk patient, optional CT at 12 months. Nodule size equals 6-8 mm In a low-risk patient, CT at 3-6 months, then consider CT at 18-24 months. In a high-risk patient, CT at 3-6 months, then CT at 18-24 months. Nodule size greater than 8 mm In a low-risk patient, CT at 3-6 months, then consider CT at 18-24 months. In a high-risk patient, CT at 3-6 months, then CT at 18-24 months. - Low risk patients include individuals with minimal or absent history of smoking and other known risk factors. - High risk patients include individuals with a history or smoking or known risk factors. Radiology 2017 http://pubs. rsna.org/doi/full/10.1148/radiol. 7757043089           PROCEDURES   Unless otherwise noted below, none     Procedures    CRITICAL CARE TIME   N/A    CONSULTS:  IP CONSULT TO GENERAL SURGERY  IP CONSULT TO HOSPITALIST      EMERGENCY DEPARTMENT COURSE and DIFFERENTIAL DIAGNOSIS/MDM:   Vitals:    Vitals:    05/10/20 1930 05/10/20 1934 05/10/20 2000 05/10/20 2030   BP:   111/79 127/74   Pulse: 105  104 106   Resp: 10  11 10   Temp:       TempSrc:       SpO2: (!) 85% 95% 90% 93%   Weight:       Height:           Patient was given the following medications:  Medications   HYDROmorphone HCl PF (DILAUDID) injection 1 mg (1 mg Intravenous Given 5/10/20 1817)   ondansetron (ZOFRAN) injection 4 mg (4 mg Intravenous Given 5/10/20 1817)   0.9 % sodium chloride bolus (0 mLs Intravenous Stopped 5/10/20 1930)   iopamidol (ISOVUE-370) 76 % injection 75 mL (75 mLs Intravenous Given 5/10/20 1854)   HYDROmorphone HCl PF (DILAUDID) injection 1 mg (1 mg Intravenous Given 5/10/20 2018)       The patient  presents to the emergency department today for evaluation for right-sided back pain. Patient has a history of chronic back pain, and is on oxycodone at home, and reviewed the patient's chart it appears that she has been seen multiple times for thoracic right-sided back pain. The patient states that her pain is been worsening over the past 2 days, and she states that \"it feels like the time I had free air\". The patient was admitted in January 2019 for possible intra-abdominal free air. The patient was seen in the ED 2 days ago, lab work and CTs were negative at that time her pain is rated as a 10/10, pain is sharp, constant, no known alleviating or aggravating factors. Patient denies falling or injuring herself in any way. The patient states that her pain is worsened since that time. She states that her pain is to the right side of her back, and will wrap around towards the right side of her flank. She denies having any chest pain, shortness of breath. She denies any abdominal pain. She has no nausea, vomiting or diarrhea. She has no urinary symptoms. She has no cough or congestion. No fevers. History of atrial fibrillation, and is anticoagulated on Coumadin. She also has a history of DVT/PE. CTA 2 days ago was negative. On physical exam, she is tachycardic in an irregular rhythm, she does have a history of A. fib. She has tenderness to the paraspinous muscles of the right thoracic spine, and over the right flank. S does not have any reproducible abdominal tenderness    CBC shows no evidence of leukocytosis, there is a mild anemia. Her CMP shows chronic kidney disease, otherwise is unremarkable. Her lipase is normal.  Her INR is 2.75. Troponin is negative. CT of chest abdomen pelvis obtained, CT of chest shows no acute change.   CT of the abdomen does show multiple loops of small bowel demonstrating pneumatosis, she also has a few dilated loops of small bowel in the mid and upper

## 2020-05-10 NOTE — ED PROVIDER NOTES
condition. The plan is to admit to the hospital at this time under the hospitalist service. Dr. Melchor Lara accepted the patient and will take over the patient's care. The total critical care time spent while evaluating and treating this patient was at least 31 minutes. This excludes time spent doing separately billable procedures. This includes time at the bedside, data interpretation, medication management, obtaining critical history from collateral sources if the patient is unable to provide it directly, and physician consultation. Specifics of interventions taken and potentially life-threatening diagnostic considerations are listed above in the medical decision making. This chart was created using Dragon dictation software. Efforts were made by me to ensure accuracy, however some errors may be present due to limitations of this technology.             Irma Keyes MD  05/10/20 9365

## 2020-05-11 ENCOUNTER — NURSE ONLY (OUTPATIENT)
Dept: CARDIOLOGY CLINIC | Age: 75
End: 2020-05-11
Payer: MEDICARE

## 2020-05-11 LAB
A/G RATIO: 0.9 (ref 1.1–2.2)
ALBUMIN SERPL-MCNC: 2.9 G/DL (ref 3.4–5)
ALP BLD-CCNC: 111 U/L (ref 40–129)
ALT SERPL-CCNC: 18 U/L (ref 10–40)
ANION GAP SERPL CALCULATED.3IONS-SCNC: 9 MMOL/L (ref 3–16)
APTT: 37.3 SEC (ref 24.2–36.2)
AST SERPL-CCNC: 19 U/L (ref 15–37)
BACTERIA: ABNORMAL /HPF
BASOPHILS ABSOLUTE: 0 K/UL (ref 0–0.2)
BASOPHILS RELATIVE PERCENT: 0.3 %
BILIRUB SERPL-MCNC: 0.3 MG/DL (ref 0–1)
BILIRUBIN URINE: ABNORMAL
BLOOD, URINE: NEGATIVE
BUN BLDV-MCNC: 34 MG/DL (ref 7–20)
CALCIUM SERPL-MCNC: 8.6 MG/DL (ref 8.3–10.6)
CHLORIDE BLD-SCNC: 109 MMOL/L (ref 99–110)
CLARITY: ABNORMAL
CO2: 21 MMOL/L (ref 21–32)
COLOR: YELLOW
CREAT SERPL-MCNC: 1.2 MG/DL (ref 0.6–1.2)
EKG ATRIAL RATE: 138 BPM
EKG DIAGNOSIS: NORMAL
EKG Q-T INTERVAL: 314 MS
EKG QRS DURATION: 84 MS
EKG QTC CALCULATION (BAZETT): 474 MS
EKG R AXIS: 57 DEGREES
EKG T AXIS: 73 DEGREES
EKG VENTRICULAR RATE: 137 BPM
EOSINOPHILS ABSOLUTE: 0.2 K/UL (ref 0–0.6)
EOSINOPHILS RELATIVE PERCENT: 3 %
EPITHELIAL CELLS, UA: 8 /HPF (ref 0–5)
GFR AFRICAN AMERICAN: 53
GFR NON-AFRICAN AMERICAN: 44
GLOBULIN: 3.3 G/DL
GLUCOSE BLD-MCNC: 102 MG/DL (ref 70–99)
GLUCOSE BLD-MCNC: 103 MG/DL (ref 70–99)
GLUCOSE BLD-MCNC: 112 MG/DL (ref 70–99)
GLUCOSE BLD-MCNC: 115 MG/DL (ref 70–99)
GLUCOSE BLD-MCNC: 67 MG/DL (ref 70–99)
GLUCOSE BLD-MCNC: 70 MG/DL (ref 70–99)
GLUCOSE BLD-MCNC: 70 MG/DL (ref 70–99)
GLUCOSE BLD-MCNC: 71 MG/DL (ref 70–99)
GLUCOSE BLD-MCNC: 72 MG/DL (ref 70–99)
GLUCOSE BLD-MCNC: 76 MG/DL (ref 70–99)
GLUCOSE BLD-MCNC: 90 MG/DL (ref 70–99)
GLUCOSE BLD-MCNC: 93 MG/DL (ref 70–99)
GLUCOSE URINE: NEGATIVE MG/DL
HCT VFR BLD CALC: 34.6 % (ref 36–48)
HEMOGLOBIN: 11.2 G/DL (ref 12–16)
HYALINE CASTS: 5 /LPF (ref 0–8)
INR BLD: 2.76 (ref 0.86–1.14)
KETONES, URINE: NEGATIVE MG/DL
LACTIC ACID: 0.9 MMOL/L (ref 0.4–2)
LEUKOCYTE ESTERASE, URINE: ABNORMAL
LYMPHOCYTES ABSOLUTE: 1.6 K/UL (ref 1–5.1)
LYMPHOCYTES RELATIVE PERCENT: 30 %
MAGNESIUM: 2.3 MG/DL (ref 1.8–2.4)
MCH RBC QN AUTO: 27.6 PG (ref 26–34)
MCHC RBC AUTO-ENTMCNC: 32.4 G/DL (ref 31–36)
MCV RBC AUTO: 85.1 FL (ref 80–100)
MICROSCOPIC EXAMINATION: YES
MONOCYTES ABSOLUTE: 0.9 K/UL (ref 0–1.3)
MONOCYTES RELATIVE PERCENT: 17.5 %
NEUTROPHILS ABSOLUTE: 2.6 K/UL (ref 1.7–7.7)
NEUTROPHILS RELATIVE PERCENT: 49.2 %
NITRITE, URINE: NEGATIVE
PDW BLD-RTO: 17.4 % (ref 12.4–15.4)
PERFORMED ON: ABNORMAL
PERFORMED ON: NORMAL
PH UA: 5.5 (ref 5–8)
PHOSPHORUS: 3.8 MG/DL (ref 2.5–4.9)
PLATELET # BLD: 224 K/UL (ref 135–450)
PMV BLD AUTO: 8.3 FL (ref 5–10.5)
POTASSIUM SERPL-SCNC: 4 MMOL/L (ref 3.5–5.1)
PREALBUMIN: 22.3 MG/DL (ref 20–40)
PROTEIN UA: NEGATIVE MG/DL
PROTHROMBIN TIME: 32.3 SEC (ref 10–13.2)
RBC # BLD: 4.07 M/UL (ref 4–5.2)
RBC UA: 1 /HPF (ref 0–4)
SODIUM BLD-SCNC: 139 MMOL/L (ref 136–145)
SPECIFIC GRAVITY UA: >1.03 (ref 1–1.03)
TOTAL PROTEIN: 6.2 G/DL (ref 6.4–8.2)
TRANSFERRIN: 224 MG/DL (ref 200–360)
URINE REFLEX TO CULTURE: YES
URINE TYPE: ABNORMAL
UROBILINOGEN, URINE: 1 E.U./DL
WBC # BLD: 5.2 K/UL (ref 4–11)
WBC UA: 31 /HPF (ref 0–5)

## 2020-05-11 PROCEDURE — 80053 COMPREHEN METABOLIC PANEL: CPT

## 2020-05-11 PROCEDURE — 83605 ASSAY OF LACTIC ACID: CPT

## 2020-05-11 PROCEDURE — 6370000000 HC RX 637 (ALT 250 FOR IP): Performed by: HOSPITALIST

## 2020-05-11 PROCEDURE — 94760 N-INVAS EAR/PLS OXIMETRY 1: CPT

## 2020-05-11 PROCEDURE — 2580000003 HC RX 258: Performed by: INTERNAL MEDICINE

## 2020-05-11 PROCEDURE — 84134 ASSAY OF PREALBUMIN: CPT

## 2020-05-11 PROCEDURE — 6370000000 HC RX 637 (ALT 250 FOR IP): Performed by: INTERNAL MEDICINE

## 2020-05-11 PROCEDURE — 99222 1ST HOSP IP/OBS MODERATE 55: CPT | Performed by: SURGERY

## 2020-05-11 PROCEDURE — 85025 COMPLETE CBC W/AUTO DIFF WBC: CPT

## 2020-05-11 PROCEDURE — 84100 ASSAY OF PHOSPHORUS: CPT

## 2020-05-11 PROCEDURE — 83735 ASSAY OF MAGNESIUM: CPT

## 2020-05-11 PROCEDURE — 85730 THROMBOPLASTIN TIME PARTIAL: CPT

## 2020-05-11 PROCEDURE — 6360000002 HC RX W HCPCS: Performed by: INTERNAL MEDICINE

## 2020-05-11 PROCEDURE — 1200000000 HC SEMI PRIVATE

## 2020-05-11 PROCEDURE — APPNB30 APP NON BILLABLE TIME 0-30 MINS: Performed by: NURSE PRACTITIONER

## 2020-05-11 PROCEDURE — 6360000002 HC RX W HCPCS: Performed by: HOSPITALIST

## 2020-05-11 PROCEDURE — 81001 URINALYSIS AUTO W/SCOPE: CPT

## 2020-05-11 PROCEDURE — 84466 ASSAY OF TRANSFERRIN: CPT

## 2020-05-11 PROCEDURE — 87086 URINE CULTURE/COLONY COUNT: CPT

## 2020-05-11 PROCEDURE — APPSS60 APP SPLIT SHARED TIME 46-60 MINUTES: Performed by: NURSE PRACTITIONER

## 2020-05-11 PROCEDURE — 85610 PROTHROMBIN TIME: CPT

## 2020-05-11 PROCEDURE — 2580000003 HC RX 258: Performed by: HOSPITALIST

## 2020-05-11 PROCEDURE — 36415 COLL VENOUS BLD VENIPUNCTURE: CPT

## 2020-05-11 PROCEDURE — 93010 ELECTROCARDIOGRAM REPORT: CPT | Performed by: INTERNAL MEDICINE

## 2020-05-11 RX ORDER — DEXTROSE MONOHYDRATE 50 MG/ML
100 INJECTION, SOLUTION INTRAVENOUS PRN
Status: DISCONTINUED | OUTPATIENT
Start: 2020-05-11 | End: 2020-05-11

## 2020-05-11 RX ORDER — METHOCARBAMOL 750 MG/1
1500 TABLET, FILM COATED ORAL 4 TIMES DAILY
Status: DISCONTINUED | OUTPATIENT
Start: 2020-05-11 | End: 2020-05-11

## 2020-05-11 RX ORDER — LIDOCAINE 4 G/G
1 PATCH TOPICAL DAILY
Status: DISCONTINUED | OUTPATIENT
Start: 2020-05-11 | End: 2020-05-18 | Stop reason: HOSPADM

## 2020-05-11 RX ORDER — ACETAMINOPHEN 500 MG
1000 TABLET ORAL EVERY 8 HOURS
Status: DISCONTINUED | OUTPATIENT
Start: 2020-05-11 | End: 2020-05-18 | Stop reason: HOSPADM

## 2020-05-11 RX ORDER — DEXTROSE, SODIUM CHLORIDE, SODIUM LACTATE, POTASSIUM CHLORIDE, AND CALCIUM CHLORIDE 5; .6; .31; .03; .02 G/100ML; G/100ML; G/100ML; G/100ML; G/100ML
INJECTION, SOLUTION INTRAVENOUS CONTINUOUS
Status: DISCONTINUED | OUTPATIENT
Start: 2020-05-11 | End: 2020-05-16

## 2020-05-11 RX ORDER — HYDROMORPHONE HYDROCHLORIDE 1 MG/ML
0.5 INJECTION, SOLUTION INTRAMUSCULAR; INTRAVENOUS; SUBCUTANEOUS EVERY 4 HOURS PRN
Status: DISCONTINUED | OUTPATIENT
Start: 2020-05-11 | End: 2020-05-18 | Stop reason: HOSPADM

## 2020-05-11 RX ORDER — DEXTROSE MONOHYDRATE 25 G/50ML
12.5 INJECTION, SOLUTION INTRAVENOUS PRN
Status: DISCONTINUED | OUTPATIENT
Start: 2020-05-11 | End: 2020-05-12 | Stop reason: SDUPTHER

## 2020-05-11 RX ORDER — HYDROMORPHONE HYDROCHLORIDE 1 MG/ML
0.5 INJECTION, SOLUTION INTRAMUSCULAR; INTRAVENOUS; SUBCUTANEOUS
Status: DISCONTINUED | OUTPATIENT
Start: 2020-05-11 | End: 2020-05-11

## 2020-05-11 RX ORDER — NICOTINE POLACRILEX 4 MG
15 LOZENGE BUCCAL PRN
Status: DISCONTINUED | OUTPATIENT
Start: 2020-05-11 | End: 2020-05-12 | Stop reason: SDUPTHER

## 2020-05-11 RX ORDER — HYDROMORPHONE HYDROCHLORIDE 1 MG/ML
1 INJECTION, SOLUTION INTRAMUSCULAR; INTRAVENOUS; SUBCUTANEOUS
Status: DISCONTINUED | OUTPATIENT
Start: 2020-05-11 | End: 2020-05-11

## 2020-05-11 RX ORDER — METHOCARBAMOL 750 MG/1
750 TABLET, FILM COATED ORAL 3 TIMES DAILY
Status: DISCONTINUED | OUTPATIENT
Start: 2020-05-11 | End: 2020-05-11

## 2020-05-11 RX ADMIN — Medication 10 ML: at 00:00

## 2020-05-11 RX ADMIN — METHOCARBAMOL TABLETS 750 MG: 750 TABLET, COATED ORAL at 09:59

## 2020-05-11 RX ADMIN — DEXTROSE MONOHYDRATE 12.5 G: 25 INJECTION, SOLUTION INTRAVENOUS at 09:13

## 2020-05-11 RX ADMIN — HYDROMORPHONE HYDROCHLORIDE 0.5 MG: 1 INJECTION, SOLUTION INTRAMUSCULAR; INTRAVENOUS; SUBCUTANEOUS at 17:12

## 2020-05-11 RX ADMIN — ACETAMINOPHEN 1000 MG: 500 TABLET, FILM COATED ORAL at 09:58

## 2020-05-11 RX ADMIN — SODIUM CHLORIDE, SODIUM LACTATE, POTASSIUM CHLORIDE, CALCIUM CHLORIDE AND DEXTROSE MONOHYDRATE: 5; 600; 310; 30; 20 INJECTION, SOLUTION INTRAVENOUS at 09:59

## 2020-05-11 RX ADMIN — ASPIRIN 81 MG 81 MG: 81 TABLET ORAL at 08:48

## 2020-05-11 RX ADMIN — CARVEDILOL 6.25 MG: 6.25 TABLET, FILM COATED ORAL at 17:11

## 2020-05-11 RX ADMIN — DEXTROSE MONOHYDRATE 12.5 G: 25 INJECTION, SOLUTION INTRAVENOUS at 21:29

## 2020-05-11 RX ADMIN — DEXTROSE MONOHYDRATE 12.5 G: 25 INJECTION, SOLUTION INTRAVENOUS at 17:13

## 2020-05-11 RX ADMIN — Medication 10 ML: at 10:09

## 2020-05-11 RX ADMIN — METHOCARBAMOL 500 MG: 100 INJECTION, SOLUTION INTRAMUSCULAR; INTRAVENOUS at 17:57

## 2020-05-11 RX ADMIN — ACETAMINOPHEN 1000 MG: 500 TABLET, FILM COATED ORAL at 17:11

## 2020-05-11 RX ADMIN — HYDROMORPHONE HYDROCHLORIDE 0.5 MG: 1 INJECTION, SOLUTION INTRAMUSCULAR; INTRAVENOUS; SUBCUTANEOUS at 13:10

## 2020-05-11 RX ADMIN — ENOXAPARIN SODIUM 40 MG: 40 INJECTION SUBCUTANEOUS at 09:59

## 2020-05-11 RX ADMIN — HYDROMORPHONE HYDROCHLORIDE 0.5 MG: 1 INJECTION, SOLUTION INTRAMUSCULAR; INTRAVENOUS; SUBCUTANEOUS at 09:13

## 2020-05-11 RX ADMIN — CARVEDILOL 6.25 MG: 6.25 TABLET, FILM COATED ORAL at 08:48

## 2020-05-11 ASSESSMENT — PAIN DESCRIPTION - DESCRIPTORS: DESCRIPTORS: CRAMPING

## 2020-05-11 ASSESSMENT — PAIN DESCRIPTION - LOCATION
LOCATION: BACK

## 2020-05-11 ASSESSMENT — PAIN SCALES - GENERAL
PAINLEVEL_OUTOF10: 9
PAINLEVEL_OUTOF10: 7
PAINLEVEL_OUTOF10: 5
PAINLEVEL_OUTOF10: 5
PAINLEVEL_OUTOF10: 3
PAINLEVEL_OUTOF10: 7
PAINLEVEL_OUTOF10: 10
PAINLEVEL_OUTOF10: 5

## 2020-05-11 ASSESSMENT — PAIN DESCRIPTION - PAIN TYPE
TYPE: ACUTE PAIN

## 2020-05-11 ASSESSMENT — PAIN DESCRIPTION - ORIENTATION: ORIENTATION: MID

## 2020-05-11 ASSESSMENT — PAIN DESCRIPTION - DIRECTION: RADIATING_TOWARDS: RIGHT

## 2020-05-11 NOTE — CONSULTS
Suzanne Chavez MD   albuterol sulfate HFA (PROAIR HFA) 108 (90 Base) MCG/ACT inhaler Inhale 2 puffs into the lungs every 6 hours as needed for Wheezing 3/12/20  Yes Elizabeth Good MD   warfarin (COUMADIN) 5 MG tablet TAKE 1 TABLET DAILY 2/26/20  Yes Román Millan MD   montelukast (SINGULAIR) 10 MG tablet TAKE 1 TABLET NIGHTLY 2/26/20  Yes Román Millan MD   HUMALOG KWIKPEN 100 UNIT/ML SOPN TAKE AS INSTRUCTED BY YOUR PRESCRIBER 12/30/19  Yes Elizabeth Good MD   torsemide (DEMADEX) 20 MG tablet TAKE 1 TABLET DAILY 12/19/19  Yes Elizabeth Good MD   carvedilol (COREG) 6.25 MG tablet Take 1 tablet by mouth 2 times daily (with meals) 12/19/19  Yes Elizabeth Good MD   exenatide (BYETTA 10 MCG PEN) 10 MCG/0.04ML injection Inject 0.04 mLs into the skin 2 times daily (with meals) 12/19/19  Yes Elizabeth Good MD   rosuvastatin (CRESTOR) 20 MG tablet TAKE 1 TABLET DAILY 9/11/19  Yes Elizabeth Good MD   spironolactone (ALDACTONE) 25 MG tablet TAKE 1 TABLET DAILY 7/25/19  Yes Román Millan MD   aspirin 81 MG chewable tablet Take 1 tablet by mouth daily 6/7/19  Yes Elizabeth Good MD   FreeStyle Lancets MISC 1 each by Does not apply route 4 times daily 4/29/20   Elizabeth Good MD   ondansetron (ZOFRAN) 4 MG tablet Take 1 tablet by mouth 3 times daily as needed for Nausea or Vomiting 3/26/20   Elizabeth Good MD   predniSONE (DELTASONE) 10 MG tablet TAKE 1 TABLET AS NEEDED (EOSINOPHILIC GASTRITIS) 9/53/23   Román Millan MD   blood glucose test strips (FREESTYLE LITE) strip Test 4 times daily 2/4/20   Elizabeth Good MD   Blood Glucose Monitoring Suppl (EMBRACE PRO GLUCOSE METER) GAETANO 1 Device by Does not apply route 4 times daily 2/4/20   Elizabeth Good MD   nystatin (MYCOSTATIN) 167283 UNIT/ML suspension TAKE ONE TEASPOONFUL BY MOUTH FOUR TIMES A DAY FOR 5 DAYS 11/20/19   Elizabeth Good MD   potassium chloride (KLOR-CON M) 10 MEQ extended release tablet TAKE 1 TABLET DAILY 7/25/19   Román Millan MD   BD PEN NEEDLE JANNET U/F 32G X 4 MM MISC USE 1 PEN NEEDLE FOUR TIMES A DAY 3/22/19   Elizabeth Good MD   vitamin B-12 500 MCG tablet Take 1 tablet by mouth daily 10/12/18   Joce Shahid MD   Blood Glucose Monitoring Suppl (FREESTYLE LITE) GAETANO 1 Device by Does not apply route 4 times daily Patient to check blood sugar 4 times a day and PRN, she is treated with multiple daily injections of insulin that require correction dosing ;A1C 8.1 ; ICD code-E11.65 ,Z79.4  Patient taking differently: 1 Device by Does not apply route 2 times daily  9/4/18   SOLEDAD Valdovinos - CNP        Allergies:  Lqurmkih-wgpywkg-harnfz [fluocinolone]; Ciprofloxacin; Diovan [valsartan]; Flagyl [metronidazole]; Metformin and related [metformin and related];  Benazepril; Morphine; Saxagliptin; and Levaquin [levofloxacin]    Social History:   Social History     Socioeconomic History    Marital status:      Spouse name: None    Number of children: None    Years of education: None    Highest education level: None   Occupational History    None   Social Needs    Financial resource strain: None    Food insecurity     Worry: None     Inability: None    Transportation needs     Medical: None     Non-medical: None   Tobacco Use    Smoking status: Never Smoker    Smokeless tobacco: Never Used   Substance and Sexual Activity    Alcohol use: No    Drug use: No    Sexual activity: None   Lifestyle    Physical activity     Days per week: None     Minutes per session: None    Stress: None   Relationships    Social connections     Talks on phone: None     Gets together: None     Attends Voodoo service: None     Active member of club or organization: None     Attends meetings of clubs or organizations: None     Relationship status: None    Intimate partner violence     Fear of current or ex partner: None     Emotionally abused: None     Physically abused: None     Forced sexual activity: None Intake/Output Summary (Last 24 hours) at 5/11/2020 1730  Last data filed at 5/11/2020 1634  Gross per 24 hour   Intake --   Output 275 ml   Net -275 ml     Drain/tube Output:         Physical Exam:  CONSTITUTIONAL:  alert, no apparent distress   NEUROLOGIC:  Mental Status Exam:  Level of Alertness:   awake  Orientation:  Oriented to person, place, time  EYES:  sclera clear  HENT:  normocepalic, without obvious abnormality  NECK:  supple, symmetrical, trachea midline  LUNGS:  clear to auscultation  CARDIOVASCULAR:  regular rate and rhythm   ABDOMEN: soft, non-distended, non-tender  SKIN:  no bruising or bleeding, normal skin color, texture, turgor and no redness, warmth, or swelling      Labs:    CBC:    Recent Labs     05/09/20  0057 05/10/20  1815 05/11/20  0553   WBC 8.1 7.1 5.2   HGB 11.8* 11.9* 11.2*   HCT 36.7 37.9 34.6*    269 224     BMP:    Recent Labs     05/09/20  0056 05/10/20  1815 05/11/20  0553    138 139   K 4.3 4.4 4.0    105 109   CO2 22 23 21   BUN 39* 36* 34*   CREATININE 1.4* 1.3* 1.2   GLUCOSE 213* 119* 67*     Hepatic:   Recent Labs     05/09/20  0056 05/10/20  1815 05/11/20  0553   AST 20 22 19   ALT 17 22 18   BILITOT 0.5 0.4 0.3   ALKPHOS 144* 131* 111     Amylase: No results for input(s): AMYLASE in the last 72 hours.   Lipase:   Recent Labs     05/10/20  1815   LIPASE 35.0     Mag:      Recent Labs     05/11/20  0553   MG 2.30      Phos:     Recent Labs     05/11/20  0553   PHOS 3.8      Coags:   Recent Labs     05/09/20  0105 05/10/20  1815 05/11/20  0553   INR 2.60* 2.75* 2.76*   APTT  --  33.9 37.3*       Imaging:  I have personally reviewed the following films:  Xr Chest Portable    Result Date: 5/10/2020  EXAMINATION: ONE XRAY VIEW OF THE CHEST 5/10/2020 5:13 pm COMPARISON: May 9, 2020 HISTORY: ORDERING SYSTEM PROVIDED HISTORY: SOB TECHNOLOGIST PROVIDED HISTORY: Reason for exam:->SOB Reason for Exam: shortness of breath Acuity: Acute Type of Exam: Initial

## 2020-05-11 NOTE — CARE COORDINATION
Discharge Planning Assessment  Rn/SW discharge planner met w/patient/family member to discuss reason for admission, current living situation, and potential needs at the time of discharge. Demographics/Insurance verified Yes    Current type of dwellin story home    Living arrangements:w/spouse    Level of function/support:independent w/all ADL's    PCP:Alfred    Last Visit to PCP:had appt scheduled for today    DME: stated no DME's    Active with any community resources/agencies/skilled home care: stated no services in the home    Medication compliance issues: stated no issues    Financial issues that could impact healthcare: stated no issues    Transportation at time of d/c (Discussed w/pt/family that on the day of discharge home, tentative time of discharge will be between 10am and noon): spouse to pick pt up when d/c ready. Discussed and provided facilities of choice if transition to a skilled nursing facility is required a the time of discharge-N/A. Tentative discharge plan: Spoke w/pt regarding any d/c needs. Pt stated she does not anticipate any d/c needs at this time.     Electronically signed by CARLOS Gilbert  [phone]

## 2020-05-11 NOTE — H&P
[facility]                     [facility], [address]                              HISTORY AND PHYSICAL    PATIENT NAME: Dona Koehler                     :        1945  MED REC NO:   [id number]                          ROOM:       1634  ACCOUNT NO:   [de-identified]                           ADMIT DATE: 05/10/2020  PROVIDER:     Mandie Haq MD    DATE OF SERVICE:  I obtained the history and performed physical exam on  the patient on the medical floor on 2020. CHIEF COMPLAINT:  Back pain and right-sided pain. HISTORY OF PRESENT ILLNESS:  A 52-year-old  female with known  history of chronic back pain presenting to the hospital with chief  complaints of what she describes as progressive back pain radiating to  the right along with abdominal pain which is in the front of the  abdomen, not associated with any nausea or vomiting, fevers or chills. The patient states that she was seen for this few days ago and was  discharged home and now returns back with increasing episodes of pain  without any radicular symptoms, without any evidence of any bowel or  bladder incontinence. At the time of my exam after the patient was  given pain medication, her symptoms have improved. The patient notes  that she has had one episode in the past where she had \"air in her  belly. \"    PAST MEDICAL HISTORY:  1. Hypertension. 2.  Dyslipidemia. 3.  Type 2 diabetes mellitus. 4.  Paroxysmal atrial fibrillation. 5.  Coronary artery disease. 6.  Peripheral vascular disease. PAST SURGICAL HISTORY:  The patient has had an endoscopy, mitral valve  replacement, colonoscopy, hysterectomy, coronary artery bypass grafting. ALLERGIC HISTORY:  CIPROFLOXACIN. FAMILY HISTORY:  Reviewed by me and is currently noncontributory. SOCIAL HISTORY:  Nonsmoker. No illicit substance use.     MEDICATIONS:  Home medication list has been reviewed by me, it includes  aspirin, Coreg, ProAir, Byetta, Humalog KwikPen, Lantus, Robaxin,  Singulair, omeprazole, Zofran, oxycodone, GlycoLax, prednisone,  Aldactone, Crestor, Demadex, warfarin, B12.    REVIEW OF SYSTEMS:  The patient's review of systems is significant for  the right-sided pain and per the history of present illness. All other  systems reviewed and are negative except for the history of present  illness. PHYSICAL EXAMINATION:  VITAL SIGNS:  Temperature 97.7, respiratory rate 18, pulse initially was  147, repeat patient is around , blood pressure _____.  _____ equal, reactive to light. ENT:  No oral mucosal lesions. RESPIRATORY SYSTEM:  The patient has got non-labored symmetrical chest  wall movements on respiration. ABDOMEN:  The patient has got soft abdomen without any evidence of  obvious guarding, rigidity or rebound. MUSCULOSKELETAL:  No acute deformities. SKIN:  Without rashes or lesions. DIAGNOSTIC DATA:  BNP 9318. Troponin less than 0.01. BUN 36,  creatinine 1.3, sodium 138, potassium 4.4. GFR is 40. Lactate 1.5. INR 2.75. Hemoglobin 11.9, hematocrit 37.9. Chest x-ray shows evidence  of interstitial prominence. EKG independently reviewed by me shows  atrial fibrillation with rapid ventricular response at the rate of 137  beats per minute. CT of the abdomen and pelvis performed in the ER at  the time of admission reveals presence of small bowel pneumatosis with  evidence of SMA calcification plaque. REVIEW OF PREVIOUS MEDICAL RECORDS:  Shows a 2-D echo from 10/2019 that  shows an LV ejection fraction of 35-40% with severe apical akinesis and  a bioprosthetic mitral valve which is well seated. CONSULTATIONS:  General Surgery consult was requested by the ER. ASSESSMENT:  1.  Small intestinal pneumatosis. 2.  Chronic systolic congestive heart failure with ischemic  cardiomyopathy. 3.  Coronary artery disease. 4.  Type 2 diabetes mellitus.   5.  Stage III chronic kidney disease. 6.  Atrial fibrillation with transient rapid ventricular response. PLAN OF CARE:  The patient is admitted to the Internal Medicine Service. N.p.o. status will be continued. Cautious IV hydration will be provided  with care to not over hydrate given the severity of the congestive heart  failure. General Surgery consult has been requested. We will monitor  the patient for any sign of worsening abdominal pain or any signs of  intestinal ischemia. CODE STATUS:  Full. EXPECTED LENGTH OF STAY:  More than two midnights based on plan of care  above. RISK:  High due to the patient's presentation with recurrent intestinal  pneumatosis. DISPOSITION:  Admitted to the Internal Medicine Service.         Janine Maurice MD    D: 05/11/2020 5:51:22       T: 05/11/2020 5:55:09     [id number]  Job#: [id number]     Doc#: [id number]    CC:

## 2020-05-12 LAB
A/G RATIO: 1 (ref 1.1–2.2)
ALBUMIN SERPL-MCNC: 2.7 G/DL (ref 3.4–5)
ALP BLD-CCNC: 98 U/L (ref 40–129)
ALT SERPL-CCNC: 17 U/L (ref 10–40)
ANION GAP SERPL CALCULATED.3IONS-SCNC: 7 MMOL/L (ref 3–16)
APTT: 35.9 SEC (ref 24.2–36.2)
AST SERPL-CCNC: 17 U/L (ref 15–37)
BASOPHILS ABSOLUTE: 0 K/UL (ref 0–0.2)
BASOPHILS RELATIVE PERCENT: 0.5 %
BILIRUB SERPL-MCNC: 0.4 MG/DL (ref 0–1)
BUN BLDV-MCNC: 25 MG/DL (ref 7–20)
CALCIUM SERPL-MCNC: 8.3 MG/DL (ref 8.3–10.6)
CHLORIDE BLD-SCNC: 109 MMOL/L (ref 99–110)
CO2: 24 MMOL/L (ref 21–32)
CREAT SERPL-MCNC: 1.1 MG/DL (ref 0.6–1.2)
EOSINOPHILS ABSOLUTE: 0.2 K/UL (ref 0–0.6)
EOSINOPHILS RELATIVE PERCENT: 5.9 %
ESTIMATED AVERAGE GLUCOSE: 134.1 MG/DL
GFR AFRICAN AMERICAN: 59
GFR NON-AFRICAN AMERICAN: 49
GLOBULIN: 2.8 G/DL
GLUCOSE BLD-MCNC: 107 MG/DL (ref 70–99)
GLUCOSE BLD-MCNC: 120 MG/DL (ref 70–99)
GLUCOSE BLD-MCNC: 140 MG/DL (ref 70–99)
GLUCOSE BLD-MCNC: 143 MG/DL (ref 70–99)
GLUCOSE BLD-MCNC: 64 MG/DL (ref 70–99)
GLUCOSE BLD-MCNC: 81 MG/DL (ref 70–99)
GLUCOSE BLD-MCNC: 89 MG/DL (ref 70–99)
HBA1C MFR BLD: 6.3 %
HCT VFR BLD CALC: 31.6 % (ref 36–48)
HEMOGLOBIN: 10.3 G/DL (ref 12–16)
INR BLD: 1.91 (ref 0.86–1.14)
LACTIC ACID: 0.7 MMOL/L (ref 0.4–2)
LYMPHOCYTES ABSOLUTE: 1 K/UL (ref 1–5.1)
LYMPHOCYTES RELATIVE PERCENT: 25.9 %
MAGNESIUM: 2.3 MG/DL (ref 1.8–2.4)
MCH RBC QN AUTO: 27.7 PG (ref 26–34)
MCHC RBC AUTO-ENTMCNC: 32.5 G/DL (ref 31–36)
MCV RBC AUTO: 85 FL (ref 80–100)
MONOCYTES ABSOLUTE: 0.5 K/UL (ref 0–1.3)
MONOCYTES RELATIVE PERCENT: 12.1 %
NEUTROPHILS ABSOLUTE: 2.1 K/UL (ref 1.7–7.7)
NEUTROPHILS RELATIVE PERCENT: 55.6 %
PDW BLD-RTO: 17.4 % (ref 12.4–15.4)
PERFORMED ON: ABNORMAL
PERFORMED ON: NORMAL
PHOSPHORUS: 3.7 MG/DL (ref 2.5–4.9)
PLATELET # BLD: 176 K/UL (ref 135–450)
PMV BLD AUTO: 8.2 FL (ref 5–10.5)
POTASSIUM SERPL-SCNC: 3.6 MMOL/L (ref 3.5–5.1)
PREALBUMIN: 20.2 MG/DL (ref 20–40)
PROTHROMBIN TIME: 22.3 SEC (ref 10–13.2)
RBC # BLD: 3.72 M/UL (ref 4–5.2)
SODIUM BLD-SCNC: 140 MMOL/L (ref 136–145)
TOTAL PROTEIN: 5.5 G/DL (ref 6.4–8.2)
TRANSFERRIN: 199 MG/DL (ref 200–360)
URINE CULTURE, ROUTINE: NORMAL
WBC # BLD: 3.8 K/UL (ref 4–11)

## 2020-05-12 PROCEDURE — 83605 ASSAY OF LACTIC ACID: CPT

## 2020-05-12 PROCEDURE — 85610 PROTHROMBIN TIME: CPT

## 2020-05-12 PROCEDURE — 2580000003 HC RX 258: Performed by: HOSPITALIST

## 2020-05-12 PROCEDURE — 6360000002 HC RX W HCPCS: Performed by: HOSPITALIST

## 2020-05-12 PROCEDURE — 85730 THROMBOPLASTIN TIME PARTIAL: CPT

## 2020-05-12 PROCEDURE — APPSS15 APP SPLIT SHARED TIME 0-15 MINUTES: Performed by: NURSE PRACTITIONER

## 2020-05-12 PROCEDURE — 83036 HEMOGLOBIN GLYCOSYLATED A1C: CPT

## 2020-05-12 PROCEDURE — 6370000000 HC RX 637 (ALT 250 FOR IP): Performed by: INTERNAL MEDICINE

## 2020-05-12 PROCEDURE — 84466 ASSAY OF TRANSFERRIN: CPT

## 2020-05-12 PROCEDURE — 6360000002 HC RX W HCPCS: Performed by: INTERNAL MEDICINE

## 2020-05-12 PROCEDURE — 84134 ASSAY OF PREALBUMIN: CPT

## 2020-05-12 PROCEDURE — 80053 COMPREHEN METABOLIC PANEL: CPT

## 2020-05-12 PROCEDURE — 83735 ASSAY OF MAGNESIUM: CPT

## 2020-05-12 PROCEDURE — 2580000003 HC RX 258: Performed by: INTERNAL MEDICINE

## 2020-05-12 PROCEDURE — 85025 COMPLETE CBC W/AUTO DIFF WBC: CPT

## 2020-05-12 PROCEDURE — 36415 COLL VENOUS BLD VENIPUNCTURE: CPT

## 2020-05-12 PROCEDURE — 1200000000 HC SEMI PRIVATE

## 2020-05-12 PROCEDURE — 99232 SBSQ HOSP IP/OBS MODERATE 35: CPT | Performed by: SURGERY

## 2020-05-12 PROCEDURE — APPNB30 APP NON BILLABLE TIME 0-30 MINS: Performed by: NURSE PRACTITIONER

## 2020-05-12 PROCEDURE — 6370000000 HC RX 637 (ALT 250 FOR IP): Performed by: HOSPITALIST

## 2020-05-12 PROCEDURE — 84100 ASSAY OF PHOSPHORUS: CPT

## 2020-05-12 RX ORDER — WARFARIN SODIUM 5 MG/1
5 TABLET ORAL
Status: DISCONTINUED | OUTPATIENT
Start: 2020-05-12 | End: 2020-05-16

## 2020-05-12 RX ORDER — DEXTROSE MONOHYDRATE 50 MG/ML
100 INJECTION, SOLUTION INTRAVENOUS PRN
Status: DISCONTINUED | OUTPATIENT
Start: 2020-05-12 | End: 2020-05-18 | Stop reason: HOSPADM

## 2020-05-12 RX ORDER — WARFARIN SODIUM 2.5 MG/1
2.5 TABLET ORAL
Status: DISCONTINUED | OUTPATIENT
Start: 2020-05-15 | End: 2020-05-16

## 2020-05-12 RX ORDER — DEXTROSE MONOHYDRATE 25 G/50ML
12.5 INJECTION, SOLUTION INTRAVENOUS PRN
Status: DISCONTINUED | OUTPATIENT
Start: 2020-05-12 | End: 2020-05-18 | Stop reason: HOSPADM

## 2020-05-12 RX ORDER — NICOTINE POLACRILEX 4 MG
15 LOZENGE BUCCAL PRN
Status: DISCONTINUED | OUTPATIENT
Start: 2020-05-12 | End: 2020-05-18 | Stop reason: HOSPADM

## 2020-05-12 RX ORDER — INSULIN LISPRO 100 [IU]/ML
0-6 INJECTION, SOLUTION INTRAVENOUS; SUBCUTANEOUS EVERY 4 HOURS
Status: DISCONTINUED | OUTPATIENT
Start: 2020-05-12 | End: 2020-05-18 | Stop reason: HOSPADM

## 2020-05-12 RX ADMIN — WARFARIN SODIUM 5 MG: 5 TABLET ORAL at 17:57

## 2020-05-12 RX ADMIN — METHOCARBAMOL 500 MG: 100 INJECTION, SOLUTION INTRAMUSCULAR; INTRAVENOUS at 18:03

## 2020-05-12 RX ADMIN — CARVEDILOL 6.25 MG: 6.25 TABLET, FILM COATED ORAL at 17:56

## 2020-05-12 RX ADMIN — METHOCARBAMOL 500 MG: 100 INJECTION, SOLUTION INTRAMUSCULAR; INTRAVENOUS at 10:53

## 2020-05-12 RX ADMIN — METHOCARBAMOL 500 MG: 100 INJECTION, SOLUTION INTRAMUSCULAR; INTRAVENOUS at 02:57

## 2020-05-12 RX ADMIN — ACETAMINOPHEN 1000 MG: 500 TABLET, FILM COATED ORAL at 17:57

## 2020-05-12 RX ADMIN — SODIUM CHLORIDE, SODIUM LACTATE, POTASSIUM CHLORIDE, CALCIUM CHLORIDE AND DEXTROSE MONOHYDRATE: 5; 600; 310; 30; 20 INJECTION, SOLUTION INTRAVENOUS at 05:42

## 2020-05-12 RX ADMIN — HYDROMORPHONE HYDROCHLORIDE 0.5 MG: 1 INJECTION, SOLUTION INTRAMUSCULAR; INTRAVENOUS; SUBCUTANEOUS at 15:24

## 2020-05-12 RX ADMIN — ASPIRIN 81 MG 81 MG: 81 TABLET ORAL at 08:42

## 2020-05-12 RX ADMIN — HYDROMORPHONE HYDROCHLORIDE 0.5 MG: 1 INJECTION, SOLUTION INTRAMUSCULAR; INTRAVENOUS; SUBCUTANEOUS at 07:00

## 2020-05-12 RX ADMIN — HYDROMORPHONE HYDROCHLORIDE 0.5 MG: 1 INJECTION, SOLUTION INTRAMUSCULAR; INTRAVENOUS; SUBCUTANEOUS at 10:57

## 2020-05-12 RX ADMIN — CARVEDILOL 6.25 MG: 6.25 TABLET, FILM COATED ORAL at 08:42

## 2020-05-12 RX ADMIN — ACETAMINOPHEN 1000 MG: 500 TABLET, FILM COATED ORAL at 02:32

## 2020-05-12 RX ADMIN — DEXTROSE MONOHYDRATE 12.5 G: 25 INJECTION, SOLUTION INTRAVENOUS at 06:47

## 2020-05-12 RX ADMIN — ENOXAPARIN SODIUM 40 MG: 40 INJECTION SUBCUTANEOUS at 08:42

## 2020-05-12 RX ADMIN — Medication 10 ML: at 08:45

## 2020-05-12 RX ADMIN — ACETAMINOPHEN 1000 MG: 500 TABLET, FILM COATED ORAL at 08:42

## 2020-05-12 ASSESSMENT — PAIN SCALES - GENERAL
PAINLEVEL_OUTOF10: 4
PAINLEVEL_OUTOF10: 5
PAINLEVEL_OUTOF10: 9
PAINLEVEL_OUTOF10: 8
PAINLEVEL_OUTOF10: 7
PAINLEVEL_OUTOF10: 7
PAINLEVEL_OUTOF10: 9
PAINLEVEL_OUTOF10: 8

## 2020-05-12 ASSESSMENT — PAIN DESCRIPTION - ORIENTATION
ORIENTATION: MID
ORIENTATION: MID

## 2020-05-12 ASSESSMENT — PAIN DESCRIPTION - PAIN TYPE
TYPE: ACUTE PAIN
TYPE: ACUTE PAIN

## 2020-05-12 ASSESSMENT — PAIN DESCRIPTION - LOCATION
LOCATION: BACK
LOCATION: BACK

## 2020-05-12 ASSESSMENT — PAIN DESCRIPTION - DIRECTION: RADIATING_TOWARDS: RIGHT

## 2020-05-12 ASSESSMENT — PAIN DESCRIPTION - DESCRIPTORS: DESCRIPTORS: CRAMPING

## 2020-05-12 NOTE — PROGRESS NOTES
the mid and   upper abdomen. Additional clusters of gas within the mesentery adjacent to   bowel loops likely within small vessels. Findings were present in April of 2019 however they appear progressed since then. Plaque and calcification noted in the superior mesenteric artery just distal   to its take-off from the aorta with at least 50% narrowing of the SMA. It   otherwise appears patent. 7.5 x 5.7 cm simple left ovarian cyst which is unchanged since 1 year ago. Recommend surgical consultation given size. Critical results were called by Dr. Vielka Quiroga MD to [facility] on   5/10/2020 at 19:56. XR CHEST PORTABLE   Final Result   Mild interstitial prominence throughout the lungs. Given cardiomegaly,   findings concerning for congestive heart failure and edema. Assessment/Plan:    Active Hospital Problems    Diagnosis    Right flank pain [R10.9]    Pneumatosis coli [K63.89]    Pneumatosis intestinalis [K63.89]     PLAN:    Pneumatosis Coli  Incidental finding. Recurrent. Abdominal exam is unremarkable  Patient is asymptomatic at this time  General surgery following  N.p.o. and repeat imaging plans noted  Keep same at this time. Diabetes mellitus type 2 with hypoglycemia  Stop Lantus  Continue n.p.o. protocol Accu-Chek and low-dose sliding scale insulin. Blood sugar appears controlled. Anticoagulant use  Continue warfarin. Cleared by general surgery to take warfarin as in-house. Anticoagulation can be reversed in the off chance that surgery is needed. Acute kidney injury  Looks prerenal.  Resolved with IV hydration. Back pain  Chronic. Physical exam consistent with muscle tenderness. No midline tenderness. Continue with Lidoderm patch  Will require PCP follow-up and possibly pain management evaluation as outpatient. Lung nodule  Patient is aware that she will require outpatient follow-up in a few months with a repeat CT scan.     Discussed

## 2020-05-12 NOTE — PROGRESS NOTES
within small vessels. Findings were present in April of 2019 however they appear progressed since then. Plaque and calcification noted in the superior mesenteric artery just distal to its take-off from the aorta with at least 50% narrowing of the SMA. It otherwise appears patent. 7.5 x 5.7 cm simple left ovarian cyst which is unchanged since 1 year ago. Recommend surgical consultation given size. Critical results were called by Dr. Marilee Ayoub MD to [facility] on 5/10/2020 at 19:56. Scheduled Meds:   insulin lispro  0-6 Units Subcutaneous Q4H    [START ON 5/15/2020] warfarin  2.5 mg Oral Once per day on Mon Fri    And    warfarin  5 mg Oral Once per day on Sun Tue Wed Thu Sat    acetaminophen  1,000 mg Oral Q8H    methocarbamol IVPB  500 mg Intravenous Q8H    lidocaine  1 patch Transdermal Daily    carvedilol  6.25 mg Oral BID WC    aspirin  81 mg Oral Daily    sodium chloride flush  10 mL Intravenous 2 times per day     Continuous Infusions:   dextrose      dextrose 5% in lactated ringers 100 mL/hr at 05/12/20 0542     PRN Meds:.glucose, dextrose, glucagon (rDNA), dextrose, HYDROmorphone, sodium chloride flush, potassium chloride, magnesium sulfate, ondansetron      Assessment:  69 y.o. female admitted with   1. Right flank pain    2. Pneumatosis of intestines    3. Acute bilateral low back pain without sciatica    4. Left ovarian cyst    5. Pulmonary nodules    6. Atherosclerosis of superior mesenteric artery ([address]    7. Anticoagulated on Coumadin        Pneumatosis intestinalis, small bowel  Back pain  Arteriolosclerosis  Atrial fibrillation on Coumadin  Chronic kidney disease  CAD, status-post CABG      Plan:  1. Continues to complain of significant back pain but denies any abdominal pain, nausea, or vomiting; no plans for surgical intervention at this time; will likely repeat CT imaging with contrast in the next 1-2 days to follow up on pneumatosis  2. Clear liquid diet as tolerated  3. IV hydration; monitor and correct electrolytes  4. Activity as tolerated  5. PRN analgesics and antiemetics--minimizing narcotics as tolerated  6. Anticoagulated with Coumadin  7. Management of medical comorbid etiologies per primary team and consulting services  8. Disposition: Anticipate discharge home in the next 1-3 days    EDUCATION:  Educated patient on plan of care and disease process--all questions answered. Plans discussed with patient and nursing. Reviewed and discussed with Dr. Josselin Garsia.       Signed:  SOLEDAD Lundberg - CNP  5/12/2020 3:00 PM     69year old female seen in follow up for pneumatosis of the small bowel seen on CT  She denies abdominal pain and has no abdominal tenderness on physical examination  Okay for clear liquids  Would recommend vascular consult as the pneumatosis has been a recurrent problem

## 2020-05-13 ENCOUNTER — APPOINTMENT (OUTPATIENT)
Dept: CT IMAGING | Age: 75
DRG: 394 | End: 2020-05-13
Payer: MEDICARE

## 2020-05-13 LAB
ALBUMIN SERPL-MCNC: 2.8 G/DL (ref 3.4–5)
ALP BLD-CCNC: 105 U/L (ref 40–129)
ALT SERPL-CCNC: 19 U/L (ref 10–40)
ANION GAP SERPL CALCULATED.3IONS-SCNC: 6 MMOL/L (ref 3–16)
AST SERPL-CCNC: 18 U/L (ref 15–37)
BASOPHILS ABSOLUTE: 0 K/UL (ref 0–0.2)
BASOPHILS RELATIVE PERCENT: 0.5 %
BILIRUB SERPL-MCNC: 0.5 MG/DL (ref 0–1)
BILIRUBIN DIRECT: <0.2 MG/DL (ref 0–0.3)
BILIRUBIN, INDIRECT: ABNORMAL MG/DL (ref 0–1)
BUN BLDV-MCNC: 20 MG/DL (ref 7–20)
CALCIUM SERPL-MCNC: 8.3 MG/DL (ref 8.3–10.6)
CHLORIDE BLD-SCNC: 109 MMOL/L (ref 99–110)
CO2: 26 MMOL/L (ref 21–32)
CREAT SERPL-MCNC: 1.1 MG/DL (ref 0.6–1.2)
EOSINOPHILS ABSOLUTE: 0.3 K/UL (ref 0–0.6)
EOSINOPHILS RELATIVE PERCENT: 6 %
GFR AFRICAN AMERICAN: 59
GFR NON-AFRICAN AMERICAN: 49
GLUCOSE BLD-MCNC: 104 MG/DL (ref 70–99)
GLUCOSE BLD-MCNC: 113 MG/DL (ref 70–99)
GLUCOSE BLD-MCNC: 125 MG/DL (ref 70–99)
GLUCOSE BLD-MCNC: 137 MG/DL (ref 70–99)
GLUCOSE BLD-MCNC: 152 MG/DL (ref 70–99)
GLUCOSE BLD-MCNC: 155 MG/DL (ref 70–99)
GLUCOSE BLD-MCNC: 175 MG/DL (ref 70–99)
GLUCOSE BLD-MCNC: 191 MG/DL (ref 70–99)
HCT VFR BLD CALC: 32.1 % (ref 36–48)
HEMOGLOBIN: 10.4 G/DL (ref 12–16)
INR BLD: 2.09 (ref 0.86–1.14)
LYMPHOCYTES ABSOLUTE: 0.9 K/UL (ref 1–5.1)
LYMPHOCYTES RELATIVE PERCENT: 18.9 %
MCH RBC QN AUTO: 27.3 PG (ref 26–34)
MCHC RBC AUTO-ENTMCNC: 32.5 G/DL (ref 31–36)
MCV RBC AUTO: 83.9 FL (ref 80–100)
MONOCYTES ABSOLUTE: 0.5 K/UL (ref 0–1.3)
MONOCYTES RELATIVE PERCENT: 11.4 %
NEUTROPHILS ABSOLUTE: 2.9 K/UL (ref 1.7–7.7)
NEUTROPHILS RELATIVE PERCENT: 63.2 %
PDW BLD-RTO: 17.8 % (ref 12.4–15.4)
PERFORMED ON: ABNORMAL
PLATELET # BLD: 189 K/UL (ref 135–450)
PMV BLD AUTO: 8.1 FL (ref 5–10.5)
POTASSIUM SERPL-SCNC: 3.8 MMOL/L (ref 3.5–5.1)
PROTHROMBIN TIME: 24.4 SEC (ref 10–13.2)
RBC # BLD: 3.82 M/UL (ref 4–5.2)
SODIUM BLD-SCNC: 141 MMOL/L (ref 136–145)
TOTAL PROTEIN: 5.6 G/DL (ref 6.4–8.2)
WBC # BLD: 4.6 K/UL (ref 4–11)

## 2020-05-13 PROCEDURE — 1200000000 HC SEMI PRIVATE

## 2020-05-13 PROCEDURE — 80076 HEPATIC FUNCTION PANEL: CPT

## 2020-05-13 PROCEDURE — 99232 SBSQ HOSP IP/OBS MODERATE 35: CPT | Performed by: SURGERY

## 2020-05-13 PROCEDURE — 74174 CTA ABD&PLVS W/CONTRAST: CPT

## 2020-05-13 PROCEDURE — 80048 BASIC METABOLIC PNL TOTAL CA: CPT

## 2020-05-13 PROCEDURE — 6370000000 HC RX 637 (ALT 250 FOR IP): Performed by: INTERNAL MEDICINE

## 2020-05-13 PROCEDURE — APPNB30 APP NON BILLABLE TIME 0-30 MINS: Performed by: NURSE PRACTITIONER

## 2020-05-13 PROCEDURE — APPSS15 APP SPLIT SHARED TIME 0-15 MINUTES: Performed by: NURSE PRACTITIONER

## 2020-05-13 PROCEDURE — 85610 PROTHROMBIN TIME: CPT

## 2020-05-13 PROCEDURE — 6360000004 HC RX CONTRAST MEDICATION: Performed by: INTERNAL MEDICINE

## 2020-05-13 PROCEDURE — 2580000003 HC RX 258: Performed by: HOSPITALIST

## 2020-05-13 PROCEDURE — APPSS60 APP SPLIT SHARED TIME 46-60 MINUTES: Performed by: NURSE PRACTITIONER

## 2020-05-13 PROCEDURE — 94760 N-INVAS EAR/PLS OXIMETRY 1: CPT

## 2020-05-13 PROCEDURE — 6370000000 HC RX 637 (ALT 250 FOR IP): Performed by: HOSPITALIST

## 2020-05-13 PROCEDURE — 6360000002 HC RX W HCPCS: Performed by: HOSPITALIST

## 2020-05-13 PROCEDURE — 85025 COMPLETE CBC W/AUTO DIFF WBC: CPT

## 2020-05-13 RX ORDER — METHOCARBAMOL 750 MG/1
750 TABLET, FILM COATED ORAL 4 TIMES DAILY PRN
Status: DISCONTINUED | OUTPATIENT
Start: 2020-05-13 | End: 2020-05-18 | Stop reason: HOSPADM

## 2020-05-13 RX ADMIN — INSULIN LISPRO 1 UNITS: 100 INJECTION, SOLUTION INTRAVENOUS; SUBCUTANEOUS at 00:21

## 2020-05-13 RX ADMIN — HYDROMORPHONE HYDROCHLORIDE 0.5 MG: 1 INJECTION, SOLUTION INTRAMUSCULAR; INTRAVENOUS; SUBCUTANEOUS at 20:47

## 2020-05-13 RX ADMIN — HYDROMORPHONE HYDROCHLORIDE 0.5 MG: 1 INJECTION, SOLUTION INTRAMUSCULAR; INTRAVENOUS; SUBCUTANEOUS at 16:00

## 2020-05-13 RX ADMIN — METHOCARBAMOL 500 MG: 100 INJECTION, SOLUTION INTRAMUSCULAR; INTRAVENOUS at 04:21

## 2020-05-13 RX ADMIN — CARVEDILOL 6.25 MG: 6.25 TABLET, FILM COATED ORAL at 17:28

## 2020-05-13 RX ADMIN — ACETAMINOPHEN 1000 MG: 500 TABLET, FILM COATED ORAL at 04:21

## 2020-05-13 RX ADMIN — ACETAMINOPHEN 1000 MG: 500 TABLET, FILM COATED ORAL at 08:27

## 2020-05-13 RX ADMIN — IOPAMIDOL 75 ML: 755 INJECTION, SOLUTION INTRAVENOUS at 17:46

## 2020-05-13 RX ADMIN — INSULIN LISPRO 1 UNITS: 100 INJECTION, SOLUTION INTRAVENOUS; SUBCUTANEOUS at 04:21

## 2020-05-13 RX ADMIN — WARFARIN SODIUM 5 MG: 5 TABLET ORAL at 17:27

## 2020-05-13 RX ADMIN — HYDROMORPHONE HYDROCHLORIDE 0.5 MG: 1 INJECTION, SOLUTION INTRAMUSCULAR; INTRAVENOUS; SUBCUTANEOUS at 07:42

## 2020-05-13 RX ADMIN — INSULIN LISPRO 1 UNITS: 100 INJECTION, SOLUTION INTRAVENOUS; SUBCUTANEOUS at 12:03

## 2020-05-13 RX ADMIN — HYDROMORPHONE HYDROCHLORIDE 0.5 MG: 1 INJECTION, SOLUTION INTRAMUSCULAR; INTRAVENOUS; SUBCUTANEOUS at 12:00

## 2020-05-13 RX ADMIN — ASPIRIN 81 MG 81 MG: 81 TABLET ORAL at 08:27

## 2020-05-13 RX ADMIN — CARVEDILOL 6.25 MG: 6.25 TABLET, FILM COATED ORAL at 08:27

## 2020-05-13 RX ADMIN — SODIUM CHLORIDE, SODIUM LACTATE, POTASSIUM CHLORIDE, CALCIUM CHLORIDE AND DEXTROSE MONOHYDRATE: 5; 600; 310; 30; 20 INJECTION, SOLUTION INTRAVENOUS at 04:31

## 2020-05-13 RX ADMIN — HYDROMORPHONE HYDROCHLORIDE 0.5 MG: 1 INJECTION, SOLUTION INTRAMUSCULAR; INTRAVENOUS; SUBCUTANEOUS at 00:20

## 2020-05-13 RX ADMIN — ACETAMINOPHEN 1000 MG: 500 TABLET, FILM COATED ORAL at 17:28

## 2020-05-13 ASSESSMENT — PAIN DESCRIPTION - FREQUENCY: FREQUENCY: CONTINUOUS

## 2020-05-13 ASSESSMENT — PAIN SCALES - GENERAL
PAINLEVEL_OUTOF10: 7
PAINLEVEL_OUTOF10: 10
PAINLEVEL_OUTOF10: 7
PAINLEVEL_OUTOF10: 9
PAINLEVEL_OUTOF10: 0
PAINLEVEL_OUTOF10: 5
PAINLEVEL_OUTOF10: 8
PAINLEVEL_OUTOF10: 0

## 2020-05-13 ASSESSMENT — PAIN DESCRIPTION - LOCATION
LOCATION: BACK

## 2020-05-13 ASSESSMENT — PAIN DESCRIPTION - ORIENTATION: ORIENTATION: MID

## 2020-05-13 ASSESSMENT — PAIN DESCRIPTION - PAIN TYPE
TYPE: ACUTE PAIN

## 2020-05-13 ASSESSMENT — PAIN DESCRIPTION - PROGRESSION: CLINICAL_PROGRESSION: NOT CHANGED

## 2020-05-13 ASSESSMENT — PAIN DESCRIPTION - DESCRIPTORS: DESCRIPTORS: SORE;CRAMPING

## 2020-05-13 ASSESSMENT — PAIN DESCRIPTION - ONSET: ONSET: ON-GOING

## 2020-05-13 NOTE — PROGRESS NOTES
Shift assessment complete, see flowsheet. Patient up in chair, no s/s of distress, respirations even and unlabored, VSS, back pain controlled at this time, tolerating clears. The care plan and education has been reviewed and mutually agreed upon with the patient. All needs attended. Fall precautions in place, call light within reach. Will continue to monitor.

## 2020-05-13 NOTE — CONSULTS
Clinical Pharmacist Note:    Pharmacy consulted by Dr. Archie Mahajan to dose warfarin for Afib. Goal INR range: 2-3    INR today: 2.09  Plan to continue home warfarin dose. 5mg tonight. Daily INR is ordered. Pharmacy will continue to follow.      Tyrone Mobley PharmD  PGY1 Pharmacy Resident  [id number]

## 2020-05-13 NOTE — PROGRESS NOTES
[facility] HOSPITALISTS PROGRESS NOTE    5/13/2020 8:19 AM        Name: Praneeth Farrell . Admitted: 5/10/2020  Primary Care Provider: Norma Hollis MD (Tel: [phone])                        Hospital Course: Admitted with pneumatosis coli incidental finding after coming to hospital for chest and back pain. Subjective:  . No acute events overnight. Resting well. Pain control. Diet ok. Labs reviewed  Denies any chest pain sob.      Reviewed interval ancillary notes    Current Medications  glucose (GLUTOSE) 40 % oral gel 15 g, PRN  dextrose 50 % IV solution, PRN  glucagon (rDNA) injection 1 mg, PRN  dextrose 5 % solution, PRN  insulin lispro (1 Unit Dial) 0-6 Units, Q4H  [START ON 5/15/2020] warfarin (COUMADIN) tablet 2.5 mg, Once per day on Mon Fri    And  warfarin (COUMADIN) tablet 5 mg, Once per day on Sun Tue Wed Thu Sat  acetaminophen (TYLENOL) tablet 1,000 mg, Q8H  HYDROmorphone HCl PF (DILAUDID) injection 0.5 mg, Q4H PRN  dextrose 5 % in lactated ringers infusion, Continuous  methocarbamol (ROBAXIN) 500 mg in dextrose 5 % 100 mL IVPB, Q8H  lidocaine 4 % external patch 1 patch, Daily  carvedilol (COREG) tablet 6.25 mg, BID WC  aspirin chewable tablet 81 mg, Daily  sodium chloride flush 0.9 % injection 10 mL, 2 times per day  sodium chloride flush 0.9 % injection 10 mL, PRN  potassium chloride 10 mEq/100 mL IVPB (Peripheral Line), PRN  magnesium sulfate 2 g in 50 mL IVPB premix, PRN  ondansetron (ZOFRAN) injection 4 mg, Q6H PRN        Objective:  /74   Pulse 77   Temp 97.5 °F (36.4 °C) (Oral)   Resp 16   Ht 5' 8\" (1.727 m)   Wt 209 lb 9.6 oz (95.1 kg)   SpO2 95%   BMI 31.87 kg/m²     Intake/Output Summary (Last 24 hours) at 5/13/2020 0819  Last data filed at 5/13/2020 0431  Gross per 24 hour   Intake 2046.67 ml   Output 550 ml   Net 1496.67 ml      Wt Readings

## 2020-05-13 NOTE — CONSULTS
IESHANevin - brushings from RLL    CARDIAC CATHETERIZATION  08/16/2018    Dr. Krystin Portillo  02/07/2017    Dr. Mary Worley - sigmoid diverticulosis, polypectomies x3    COLONOSCOPY  01/17/2014    Dr. Mary Worley - sigmoid diverticulosis, polypectomies x3, internal hemorrhoids    COLONOSCOPY  10/10/2018    w/biopsy performed by Lissette Aguilar MD at [address]  08/21/2018    Dr. Hayley Mcclelland - x3 (LIMA-LAD, L SV-D1-PLV) modified BL MAZE procedure w/obliteration of WAYNE using 45mm AtriClip    HYSTERECTOMY      INSERTABLE CARDIAC MONITOR Left 08/16/2018    Dr. Lady Enamorado # [id number] [employer]    MITRAL VALVE REPLACEMENT  08/21/2018    Dr. Hayley Mcclelland - 27mm [employer] Cinch tissue valve    SKIN BIOPSY      TRANSESOPHAGEAL ECHOCARDIOGRAM  08/21/2018    during CABG/MVR    TUNNELED VENOUS CATHETER PLACEMENT Left 08/23/2018    Dr. Rema Rizvi for HD    UPPER GASTROINTESTINAL ENDOSCOPY N/A 10/10/2018    w/biopsy performed by Lissette Aguilar MD at [address] [place] [address]   Allergen Reactions    Wpqsthch-Vueufle-Rxmscx [Fluocinolone] Shortness Of Breath    Ciprofloxacin Shortness Of Breath    Diovan [Valsartan] Shortness Of Breath    Flagyl [Metronidazole] Shortness Of Breath     Has taken diflucan at home 12/7/15    Metformin And Related [Metformin And Related] Shortness Of Breath    Benazepril      Other reaction(s): Not Recorded    Morphine      Bad reaction. \"makes her feel horrible\".      Saxagliptin      Other reaction(s): Not Recorded    Levaquin [Levofloxacin] Rash       Social History     Socioeconomic History    Marital status:      Spouse name: Not on file    Number of children: Not on file    Years of education: Not on file    Highest education level: Not on file   Occupational History    Not on file   Social Needs    Financial resource strain: Not on file    Food insecurity     Worry: no murmur, click, rub or gallop  Abdomen: soft, nontender, active bowel sounds  Extremities: warm and well perfused, no signs of cyanosis or ischemia, no significant edema, bilateral upper and lower extremity motorsensory intact  Vascular exam:  - R radial: 2+  - L radial: 2+  - R femoral: 2+  - L femoral: 2+  - R DP: 1+  - L DP: 1+  - R PT: 1+  - L PT: 1+    Labs  Lab Results   Component Value Date    WBC 4.6 05/13/2020    HGB 10.4 05/13/2020    HCT 32.1 05/13/2020    MCV 83.9 05/13/2020     05/13/2020     Lab Results   Component Value Date     05/13/2020    K 3.8 05/13/2020    K 4.3 05/09/2020     05/13/2020    CO2 26 05/13/2020    PHOS 3.7 05/12/2020    BUN 20 05/13/2020    CREATININE 1.1 05/13/2020      No components found for: GLU    Scheduled Meds:    insulin lispro  0-6 Units Subcutaneous Q4H    [START ON 5/15/2020] warfarin  2.5 mg Oral Once per day on Mon Fri    And    warfarin  5 mg Oral Once per day on Sun Tue Wed Thu Sat    acetaminophen  1,000 mg Oral Q8H    lidocaine  1 patch Transdermal Daily    carvedilol  6.25 mg Oral BID WC    aspirin  81 mg Oral Daily    sodium chloride flush  10 mL Intravenous 2 times per day     Continuous Infusions:    dextrose      dextrose 5% in lactated ringers 100 mL/hr at 05/13/20 0431       Imaging:     CT chest/abd/pelvis w/ contrast 5/10/20:  Impression   Atelectasis in the lungs as well as scattered pulmonary nodules measuring up   to 5 mm.  These are similar to those seen on April 24, 2019 study.  No   further follow-up recommended.       A few dilated loops of small bowel within the mid and upper abdomen measuring   up to a 4.3 cm.  No discrete transition point visualized.       Multiple loops of small bowel demonstrating pneumatosis within the mid and   upper abdomen.  Additional clusters of gas within the mesentery adjacent to   bowel loops likely within small vessels.  Findings were present in April of 2019 however they appear progressed since then.       Plaque and calcification noted in the superior mesenteric artery just distal   to its take-off from the aorta with at least 50% narrowing of the SMA.  It   otherwise appears patent.       7.5 x 5.7 cm simple left ovarian cyst which is unchanged since 1 year ago. Recommend surgical consultation given size. CTA abd/pelvis 4/24/2019:  Impression   1. Moderate diffuse atherosclerotic disease.  No evidence for aortic aneurysm   or dissection. 2. Enlarged pulmonary trunk noted which can be seen in pulmonary hypertension. 3. Stable sub 5 mm lung nodules described in detail above.  No follow-up   imaging recommended.  These dates back to July 2016.   4. Cardiomegaly. 5. A few mildly dilated left mid abdomen small bowel loops without transition   point.  Recommend close observation and follow-up to exclude developing ileus   or obstruction. 6. Unchanged 8.2 x 6.0 cm left adnexal cystic mass.  Recommend follow-up   pelvic MRI with IV contrast or surgical evaluation.           Assessment:   Pneumatosis intestinalis  Right sided back pain  Atrial fibrillation on Coumadin  Hypertension  Pulmonary nodules   Diabetes mellitus type 2 - last A1c 6.3 (5/12/20)    Plan:  Continue antiplatelet and statin therapy. CT reviewed showing calcifications, no significant stenoses seen. If plan is for repeat CT scan would recommend CTA abd/pelvis to further evaluate vessels for stenosis. D/w Dr. Odell Smith. Thank you for the consultation. Patient educated on plan of care and disease process. All questions answered. Electronically signed by SOLEDAD Ly CNP on 5/13/2020 at 1:58 PM     Vascular Staff    I independently performed an evaluation on [place]y. I have reviewed the above documentation completed by Laron Foley CNP. Please see my additional contributions to the HPI, physical exam, assessment, and medical decision making.     70 yo female admitted with flank

## 2020-05-14 LAB
ANION GAP SERPL CALCULATED.3IONS-SCNC: 6 MMOL/L (ref 3–16)
APTT: 39.1 SEC (ref 24.2–36.2)
BASOPHILS ABSOLUTE: 0 K/UL (ref 0–0.2)
BASOPHILS RELATIVE PERCENT: 0.3 %
BUN BLDV-MCNC: 15 MG/DL (ref 7–20)
CALCIUM SERPL-MCNC: 8.3 MG/DL (ref 8.3–10.6)
CHLORIDE BLD-SCNC: 108 MMOL/L (ref 99–110)
CO2: 25 MMOL/L (ref 21–32)
CREAT SERPL-MCNC: 1 MG/DL (ref 0.6–1.2)
EOSINOPHILS ABSOLUTE: 0.3 K/UL (ref 0–0.6)
EOSINOPHILS RELATIVE PERCENT: 6.4 %
GFR AFRICAN AMERICAN: >60
GFR NON-AFRICAN AMERICAN: 54
GLUCOSE BLD-MCNC: 114 MG/DL (ref 70–99)
GLUCOSE BLD-MCNC: 117 MG/DL (ref 70–99)
GLUCOSE BLD-MCNC: 127 MG/DL (ref 70–99)
GLUCOSE BLD-MCNC: 134 MG/DL (ref 70–99)
GLUCOSE BLD-MCNC: 139 MG/DL (ref 70–99)
GLUCOSE BLD-MCNC: 90 MG/DL (ref 70–99)
GLUCOSE BLD-MCNC: 92 MG/DL (ref 70–99)
HCT VFR BLD CALC: 32.4 % (ref 36–48)
HEMOGLOBIN: 10.4 G/DL (ref 12–16)
INR BLD: 2.49 (ref 0.86–1.14)
LACTIC ACID: 0.8 MMOL/L (ref 0.4–2)
LYMPHOCYTES ABSOLUTE: 0.6 K/UL (ref 1–5.1)
LYMPHOCYTES RELATIVE PERCENT: 14.5 %
MCH RBC QN AUTO: 27.4 PG (ref 26–34)
MCHC RBC AUTO-ENTMCNC: 32.2 G/DL (ref 31–36)
MCV RBC AUTO: 85.2 FL (ref 80–100)
MONOCYTES ABSOLUTE: 0.4 K/UL (ref 0–1.3)
MONOCYTES RELATIVE PERCENT: 9.7 %
NEUTROPHILS ABSOLUTE: 3 K/UL (ref 1.7–7.7)
NEUTROPHILS RELATIVE PERCENT: 69.1 %
PDW BLD-RTO: 18 % (ref 12.4–15.4)
PERFORMED ON: ABNORMAL
PERFORMED ON: NORMAL
PERFORMED ON: NORMAL
PLATELET # BLD: 183 K/UL (ref 135–450)
PMV BLD AUTO: 8.1 FL (ref 5–10.5)
POTASSIUM SERPL-SCNC: 3.5 MMOL/L (ref 3.5–5.1)
PROTHROMBIN TIME: 29.2 SEC (ref 10–13.2)
RBC # BLD: 3.81 M/UL (ref 4–5.2)
SODIUM BLD-SCNC: 139 MMOL/L (ref 136–145)
WBC # BLD: 4.3 K/UL (ref 4–11)

## 2020-05-14 PROCEDURE — 2580000003 HC RX 258: Performed by: HOSPITALIST

## 2020-05-14 PROCEDURE — 99232 SBSQ HOSP IP/OBS MODERATE 35: CPT | Performed by: SURGERY

## 2020-05-14 PROCEDURE — APPNB30 APP NON BILLABLE TIME 0-30 MINS: Performed by: NURSE PRACTITIONER

## 2020-05-14 PROCEDURE — 85730 THROMBOPLASTIN TIME PARTIAL: CPT

## 2020-05-14 PROCEDURE — 6370000000 HC RX 637 (ALT 250 FOR IP): Performed by: HOSPITALIST

## 2020-05-14 PROCEDURE — 83605 ASSAY OF LACTIC ACID: CPT

## 2020-05-14 PROCEDURE — 36415 COLL VENOUS BLD VENIPUNCTURE: CPT

## 2020-05-14 PROCEDURE — 6360000002 HC RX W HCPCS: Performed by: HOSPITALIST

## 2020-05-14 PROCEDURE — 6370000000 HC RX 637 (ALT 250 FOR IP): Performed by: INTERNAL MEDICINE

## 2020-05-14 PROCEDURE — 6370000000 HC RX 637 (ALT 250 FOR IP): Performed by: SURGERY

## 2020-05-14 PROCEDURE — 99221 1ST HOSP IP/OBS SF/LOW 40: CPT | Performed by: SURGERY

## 2020-05-14 PROCEDURE — 6360000002 HC RX W HCPCS: Performed by: INTERNAL MEDICINE

## 2020-05-14 PROCEDURE — 1200000000 HC SEMI PRIVATE

## 2020-05-14 PROCEDURE — 85025 COMPLETE CBC W/AUTO DIFF WBC: CPT

## 2020-05-14 PROCEDURE — 85610 PROTHROMBIN TIME: CPT

## 2020-05-14 PROCEDURE — 80048 BASIC METABOLIC PNL TOTAL CA: CPT

## 2020-05-14 PROCEDURE — APPSS30 APP SPLIT SHARED TIME 16-30 MINUTES: Performed by: NURSE PRACTITIONER

## 2020-05-14 RX ORDER — OXYCODONE HYDROCHLORIDE 5 MG/1
5 TABLET ORAL EVERY 4 HOURS PRN
Status: DISCONTINUED | OUTPATIENT
Start: 2020-05-14 | End: 2020-05-18 | Stop reason: HOSPADM

## 2020-05-14 RX ORDER — ACETAMINOPHEN 325 MG/1
650 TABLET ORAL EVERY 6 HOURS
Status: DISCONTINUED | OUTPATIENT
Start: 2020-05-14 | End: 2020-05-18 | Stop reason: HOSPADM

## 2020-05-14 RX ORDER — OXYCODONE HYDROCHLORIDE 5 MG/1
10 TABLET ORAL EVERY 4 HOURS PRN
Status: DISCONTINUED | OUTPATIENT
Start: 2020-05-14 | End: 2020-05-18 | Stop reason: HOSPADM

## 2020-05-14 RX ORDER — SODIUM CHLORIDE 9 MG/ML
INJECTION, SOLUTION INTRAVENOUS CONTINUOUS
Status: DISCONTINUED | OUTPATIENT
Start: 2020-05-14 | End: 2020-05-14

## 2020-05-14 RX ADMIN — ACETAMINOPHEN 1000 MG: 500 TABLET, FILM COATED ORAL at 07:52

## 2020-05-14 RX ADMIN — HYDROMORPHONE HYDROCHLORIDE 0.5 MG: 1 INJECTION, SOLUTION INTRAMUSCULAR; INTRAVENOUS; SUBCUTANEOUS at 07:52

## 2020-05-14 RX ADMIN — CARVEDILOL 6.25 MG: 6.25 TABLET, FILM COATED ORAL at 07:52

## 2020-05-14 RX ADMIN — ACETAMINOPHEN 1000 MG: 500 TABLET, FILM COATED ORAL at 00:14

## 2020-05-14 RX ADMIN — METHOCARBAMOL TABLETS 750 MG: 750 TABLET, COATED ORAL at 00:14

## 2020-05-14 RX ADMIN — SODIUM CHLORIDE, SODIUM LACTATE, POTASSIUM CHLORIDE, CALCIUM CHLORIDE AND DEXTROSE MONOHYDRATE: 5; 600; 310; 30; 20 INJECTION, SOLUTION INTRAVENOUS at 17:07

## 2020-05-14 RX ADMIN — ASPIRIN 81 MG 81 MG: 81 TABLET ORAL at 07:52

## 2020-05-14 RX ADMIN — HYDROMORPHONE HYDROCHLORIDE 0.5 MG: 1 INJECTION, SOLUTION INTRAMUSCULAR; INTRAVENOUS; SUBCUTANEOUS at 13:00

## 2020-05-14 RX ADMIN — ACETAMINOPHEN 650 MG: 325 TABLET, FILM COATED ORAL at 21:25

## 2020-05-14 RX ADMIN — ONDANSETRON 4 MG: 2 INJECTION INTRAMUSCULAR; INTRAVENOUS at 14:16

## 2020-05-14 RX ADMIN — HYDROMORPHONE HYDROCHLORIDE 0.5 MG: 1 INJECTION, SOLUTION INTRAMUSCULAR; INTRAVENOUS; SUBCUTANEOUS at 18:23

## 2020-05-14 RX ADMIN — ONDANSETRON 4 MG: 2 INJECTION INTRAMUSCULAR; INTRAVENOUS at 07:52

## 2020-05-14 RX ADMIN — WARFARIN SODIUM 5 MG: 5 TABLET ORAL at 18:27

## 2020-05-14 RX ADMIN — SODIUM CHLORIDE: 9 INJECTION, SOLUTION INTRAVENOUS at 07:52

## 2020-05-14 RX ADMIN — CARVEDILOL 6.25 MG: 6.25 TABLET, FILM COATED ORAL at 18:26

## 2020-05-14 RX ADMIN — SODIUM CHLORIDE, SODIUM LACTATE, POTASSIUM CHLORIDE, CALCIUM CHLORIDE AND DEXTROSE MONOHYDRATE: 5; 600; 310; 30; 20 INJECTION, SOLUTION INTRAVENOUS at 02:52

## 2020-05-14 ASSESSMENT — PAIN - FUNCTIONAL ASSESSMENT: PAIN_FUNCTIONAL_ASSESSMENT: ACTIVITIES ARE NOT PREVENTED

## 2020-05-14 ASSESSMENT — PAIN DESCRIPTION - PROGRESSION
CLINICAL_PROGRESSION: NOT CHANGED
CLINICAL_PROGRESSION: NOT CHANGED

## 2020-05-14 ASSESSMENT — PAIN DESCRIPTION - ONSET
ONSET: ON-GOING
ONSET: ON-GOING

## 2020-05-14 ASSESSMENT — PAIN DESCRIPTION - DIRECTION: RADIATING_TOWARDS: RIGHT

## 2020-05-14 ASSESSMENT — PAIN DESCRIPTION - ORIENTATION
ORIENTATION: MID
ORIENTATION: MID;UPPER

## 2020-05-14 ASSESSMENT — PAIN SCALES - GENERAL
PAINLEVEL_OUTOF10: 5
PAINLEVEL_OUTOF10: 7
PAINLEVEL_OUTOF10: 8
PAINLEVEL_OUTOF10: 5
PAINLEVEL_OUTOF10: 7

## 2020-05-14 ASSESSMENT — PAIN DESCRIPTION - FREQUENCY
FREQUENCY: CONTINUOUS
FREQUENCY: CONTINUOUS

## 2020-05-14 ASSESSMENT — PAIN DESCRIPTION - DESCRIPTORS
DESCRIPTORS: SORE
DESCRIPTORS: SORE

## 2020-05-14 ASSESSMENT — PAIN DESCRIPTION - LOCATION
LOCATION: BACK
LOCATION: BACK;SHOULDER

## 2020-05-14 ASSESSMENT — PAIN DESCRIPTION - PAIN TYPE
TYPE: ACUTE PAIN
TYPE: ACUTE PAIN

## 2020-05-14 NOTE — PROGRESS NOTES
Informed Dr Erasto Au via perfect serve that pt is still NPO and last glucose was 90. Order noted to restart D5% LR at 100 ml/h.

## 2020-05-14 NOTE — PROGRESS NOTES
[facility] HOSPITALISTS PROGRESS NOTE    5/14/2020 11:49 AM        Name: Jesus Meade . Admitted: 5/10/2020  Primary Care Provider: Ness Hull MD (Tel: [phone])                        Hospital Course: Admitted with pneumatosis coli incidental finding after coming to hospital for chest and back pain.     Subjective: denies any abdominal pain today    No acute events overnight. Resting well. Pain control. Diet ok. Labs reviewed  Denies any chest pain sob.      Reviewed interval ancillary notes    Current Medications  0.9 % sodium chloride infusion, Continuous  methocarbamol (ROBAXIN) tablet 750 mg, 4x Daily PRN  glucose (GLUTOSE) 40 % oral gel 15 g, PRN  dextrose 50 % IV solution, PRN  glucagon (rDNA) injection 1 mg, PRN  dextrose 5 % solution, PRN  insulin lispro (1 Unit Dial) 0-6 Units, Q4H  [START ON 5/15/2020] warfarin (COUMADIN) tablet 2.5 mg, Once per day on Mon Fri    And  warfarin (COUMADIN) tablet 5 mg, Once per day on Sun Tue Wed Thu Sat  acetaminophen (TYLENOL) tablet 1,000 mg, Q8H  HYDROmorphone HCl PF (DILAUDID) injection 0.5 mg, Q4H PRN  dextrose 5 % in lactated ringers infusion, Continuous  lidocaine 4 % external patch 1 patch, Daily  carvedilol (COREG) tablet 6.25 mg, BID WC  aspirin chewable tablet 81 mg, Daily  sodium chloride flush 0.9 % injection 10 mL, 2 times per day  sodium chloride flush 0.9 % injection 10 mL, PRN  potassium chloride 10 mEq/100 mL IVPB (Peripheral Line), PRN  magnesium sulfate 2 g in 50 mL IVPB premix, PRN  ondansetron (ZOFRAN) injection 4 mg, Q6H PRN        Objective:  /69   Pulse 92   Temp 97.5 °F (36.4 °C) (Oral)   Resp 17   Ht 5' 8\" (1.727 m)   Wt 209 lb 1.6 oz (94.8 kg)   SpO2 94%   BMI 31.79 kg/m²     Intake/Output Summary (Last 24 hours) at 5/14/2020 1149  Last data filed at 5/13/2020 2043  Gross per 24 hour   Intake

## 2020-05-14 NOTE — PROGRESS NOTES
[facility]        Progress Note    Patient Name: Jesus Meade  MRN: [id number]  YOB: 1945  Date of Evaluation: 2020    Chief Complaint: Back pain    Subjective:  No acute events overnight  Complex pain, primarily right sided back pain but also admits to right upper quadrant pain and difficulty differentiating   Intermittent nausea without emesis with clear liquids  Passing non-bloody stool    Vital Signs:  Patient Vitals for the past 24 hrs:   BP Temp Temp src Pulse Resp SpO2 Weight   20 1328 110/66 98.2 °F (36.8 °C) Oral 67 16 97 % --   20 0456 -- -- -- -- -- -- 209 lb 1.6 oz (94.8 kg)   20 0015 107/69 97.5 °F (36.4 °C) Oral 92 17 94 % --   20 2043 112/71 97.4 °F (36.3 °C) Oral 78 16 97 % --   20 1730 136/80 -- -- 66 16 96 % --      TEMPERATURE HISTORY 24H: Temp (24hrs), Av.7 °F (36.5 °C), Min:97.4 °F (36.3 °C), Max:98.2 °F (36.8 °C)    BLOOD PRESSURE HISTORY: Systolic (36YGQ), CIB:101 , Min:107 , ITW:782    Diastolic (32BJQ), RZX:00, Min:66, Max:80      Intake/Output:  I/O last 3 completed shifts: In: 928 [P.O.:200; I.V.:578]  Out: 300 [Urine:300]  No intake/output data recorded.   Drain/tube Output:       Physical Exam:  General: awake, alert, oriented to  person, place, time  Cardiac: irregular irregular without murmur   Pulmonary: clear to auscultation bilaterally   Abdomen: soft, non-distended, non-tender    Labs:  CBC:    Recent Labs     20  0525 20  0503 20  0534   WBC 3.8* 4.6 4.3   HGB 10.3* 10.4* 10.4*   HCT 31.6* 32.1* 32.4*    189 183     BMP:    Recent Labs     20  0525 20  0503 20  0534    141 139   K 3.6 3.8 3.5    109 108   CO2 24 26 25   BUN 25* 20 15   CREATININE 1.1 1.1 1.0   GLUCOSE 81 125* 139*     Hepatic:    Recent Labs     20  0525 20  0503   AST 17 18   ALT 17 19   BILITOT 0.4 0.5   ALKPHOS 98 105     Amylase:    Lab Results Component Value Date    AMYLASE 38 12/07/2015     Lipase:    Lab Results   Component Value Date    LIPASE 35.0 05/10/2020    LIPASE 72.0 04/24/2019    LIPASE 78.0 01/14/2019      Mag:    Lab Results   Component Value Date    MG 2.30 05/12/2020    MG 2.30 05/11/2020     Phos:     Lab Results   Component Value Date    PHOS 3.7 05/12/2020    PHOS 3.8 05/11/2020      Coags:   Lab Results   Component Value Date    PROTIME 29.2 05/14/2020    INR 2.49 05/14/2020    APTT 39.1 05/14/2020       Cultures:  Anaerobic culture  No results found for: LABANAE  Fungus stain  No results found for requested labs within last 30 days. Gram stain  No results found for requested labs within last 30 days. Organism  Lab Results   Component Value Date/Time    ORG Staphylococcus coagulase-negative (A) 10/01/2018 08:55 PM    ORG Staphylococcus coagulase negative DNA Detected (A) 10/01/2018 08:55 PM    ORG Staphylococcus coagulase-negative (A) 10/01/2018 08:55 PM     Surgical culture  No results found for: CXSURG  Blood culture 1  No results found for requested labs within last 30 days. Blood culture 2  No results found for requested labs within last 30 days. Fecal occult  No results found for requested labs within last 30 days. GI bacterial pathogens by PCR  No results found for requested labs within last 30 days. C. difficile  No results found for requested labs within last 30 days. Urine culture  Lab Results   Component Value Date    LABURIN  05/11/2020     <10,000 CFU/ml mixed skin/urogenital art. No further workup       Pathology:  No relevant pathology     Imaging:  I have personally reviewed the following films:    No results found.     Scheduled Meds:   acetaminophen  650 mg Oral Q6H    insulin lispro  0-6 Units Subcutaneous Q4H    [START ON 5/15/2020] warfarin  2.5 mg Oral Once per day on Mon Fri    And    warfarin  5 mg Oral Once per day on Sun Tue Wed Thu Sat    acetaminophen  1,000 mg Oral Q8H   

## 2020-05-14 NOTE — CARE COORDINATION
Barrier to d/c: pt admitted for right flank pain-surgery following pt-dispo d/c 1-3 days. Presented pt w/IMM letter-copy provided and original placed in chart. Pt denied d/c needs at this time.   Electronically signed by CARLOS Low on 5/14/2020 at 12:40 PM

## 2020-05-15 LAB
ANION GAP SERPL CALCULATED.3IONS-SCNC: 8 MMOL/L (ref 3–16)
BASOPHILS ABSOLUTE: 0 K/UL (ref 0–0.2)
BASOPHILS RELATIVE PERCENT: 0.4 %
BUN BLDV-MCNC: 14 MG/DL (ref 7–20)
CALCIUM SERPL-MCNC: 8.1 MG/DL (ref 8.3–10.6)
CHLORIDE BLD-SCNC: 111 MMOL/L (ref 99–110)
CO2: 22 MMOL/L (ref 21–32)
CREAT SERPL-MCNC: 1 MG/DL (ref 0.6–1.2)
EOSINOPHILS ABSOLUTE: 0.3 K/UL (ref 0–0.6)
EOSINOPHILS RELATIVE PERCENT: 7.4 %
GFR AFRICAN AMERICAN: >60
GFR NON-AFRICAN AMERICAN: 54
GLUCOSE BLD-MCNC: 115 MG/DL (ref 70–99)
GLUCOSE BLD-MCNC: 129 MG/DL (ref 70–99)
GLUCOSE BLD-MCNC: 144 MG/DL (ref 70–99)
GLUCOSE BLD-MCNC: 145 MG/DL (ref 70–99)
GLUCOSE BLD-MCNC: 84 MG/DL (ref 70–99)
GLUCOSE BLD-MCNC: 86 MG/DL (ref 70–99)
GLUCOSE BLD-MCNC: 91 MG/DL (ref 70–99)
HCT VFR BLD CALC: 31.8 % (ref 36–48)
HEMOGLOBIN: 10.3 G/DL (ref 12–16)
INR BLD: 2.86 (ref 0.86–1.14)
LACTIC ACID: 0.8 MMOL/L (ref 0.4–2)
LYMPHOCYTES ABSOLUTE: 0.7 K/UL (ref 1–5.1)
LYMPHOCYTES RELATIVE PERCENT: 18.4 %
MCH RBC QN AUTO: 27.4 PG (ref 26–34)
MCHC RBC AUTO-ENTMCNC: 32.3 G/DL (ref 31–36)
MCV RBC AUTO: 84.9 FL (ref 80–100)
MONOCYTES ABSOLUTE: 0.5 K/UL (ref 0–1.3)
MONOCYTES RELATIVE PERCENT: 13.2 %
NEUTROPHILS ABSOLUTE: 2.4 K/UL (ref 1.7–7.7)
NEUTROPHILS RELATIVE PERCENT: 60.6 %
PDW BLD-RTO: 18 % (ref 12.4–15.4)
PERFORMED ON: ABNORMAL
PERFORMED ON: NORMAL
PERFORMED ON: NORMAL
PLATELET # BLD: 180 K/UL (ref 135–450)
PMV BLD AUTO: 8 FL (ref 5–10.5)
POTASSIUM SERPL-SCNC: 3.6 MMOL/L (ref 3.5–5.1)
PROTHROMBIN TIME: 33.5 SEC (ref 10–13.2)
RBC # BLD: 3.75 M/UL (ref 4–5.2)
SODIUM BLD-SCNC: 141 MMOL/L (ref 136–145)
WBC # BLD: 3.9 K/UL (ref 4–11)

## 2020-05-15 PROCEDURE — 6360000002 HC RX W HCPCS: Performed by: HOSPITALIST

## 2020-05-15 PROCEDURE — 80048 BASIC METABOLIC PNL TOTAL CA: CPT

## 2020-05-15 PROCEDURE — 85025 COMPLETE CBC W/AUTO DIFF WBC: CPT

## 2020-05-15 PROCEDURE — APPSS15 APP SPLIT SHARED TIME 0-15 MINUTES: Performed by: NURSE PRACTITIONER

## 2020-05-15 PROCEDURE — 6370000000 HC RX 637 (ALT 250 FOR IP): Performed by: INTERNAL MEDICINE

## 2020-05-15 PROCEDURE — 6370000000 HC RX 637 (ALT 250 FOR IP): Performed by: HOSPITALIST

## 2020-05-15 PROCEDURE — 6370000000 HC RX 637 (ALT 250 FOR IP): Performed by: SURGERY

## 2020-05-15 PROCEDURE — 99232 SBSQ HOSP IP/OBS MODERATE 35: CPT | Performed by: SURGERY

## 2020-05-15 PROCEDURE — 2580000003 HC RX 258: Performed by: HOSPITALIST

## 2020-05-15 PROCEDURE — 1200000000 HC SEMI PRIVATE

## 2020-05-15 PROCEDURE — 85610 PROTHROMBIN TIME: CPT

## 2020-05-15 PROCEDURE — 83605 ASSAY OF LACTIC ACID: CPT

## 2020-05-15 PROCEDURE — APPNB30 APP NON BILLABLE TIME 0-30 MINS: Performed by: NURSE PRACTITIONER

## 2020-05-15 RX ADMIN — OXYCODONE 5 MG: 5 TABLET ORAL at 08:18

## 2020-05-15 RX ADMIN — CARVEDILOL 6.25 MG: 6.25 TABLET, FILM COATED ORAL at 16:18

## 2020-05-15 RX ADMIN — OXYCODONE 10 MG: 5 TABLET ORAL at 12:05

## 2020-05-15 RX ADMIN — ACETAMINOPHEN 1000 MG: 500 TABLET, FILM COATED ORAL at 00:53

## 2020-05-15 RX ADMIN — WARFARIN SODIUM 2.5 MG: 2.5 TABLET ORAL at 16:19

## 2020-05-15 RX ADMIN — INSULIN LISPRO 1 UNITS: 100 INJECTION, SOLUTION INTRAVENOUS; SUBCUTANEOUS at 13:02

## 2020-05-15 RX ADMIN — SODIUM CHLORIDE, SODIUM LACTATE, POTASSIUM CHLORIDE, CALCIUM CHLORIDE AND DEXTROSE MONOHYDRATE: 5; 600; 310; 30; 20 INJECTION, SOLUTION INTRAVENOUS at 14:17

## 2020-05-15 RX ADMIN — HYDROMORPHONE HYDROCHLORIDE 0.5 MG: 1 INJECTION, SOLUTION INTRAMUSCULAR; INTRAVENOUS; SUBCUTANEOUS at 10:37

## 2020-05-15 RX ADMIN — ACETAMINOPHEN 650 MG: 325 TABLET, FILM COATED ORAL at 15:40

## 2020-05-15 RX ADMIN — METHOCARBAMOL TABLETS 750 MG: 750 TABLET, COATED ORAL at 08:18

## 2020-05-15 RX ADMIN — SODIUM CHLORIDE, SODIUM LACTATE, POTASSIUM CHLORIDE, CALCIUM CHLORIDE AND DEXTROSE MONOHYDRATE: 5; 600; 310; 30; 20 INJECTION, SOLUTION INTRAVENOUS at 21:27

## 2020-05-15 RX ADMIN — OXYCODONE 10 MG: 5 TABLET ORAL at 16:18

## 2020-05-15 RX ADMIN — OXYCODONE 10 MG: 5 TABLET ORAL at 21:26

## 2020-05-15 RX ADMIN — ASPIRIN 81 MG 81 MG: 81 TABLET ORAL at 08:18

## 2020-05-15 RX ADMIN — CARVEDILOL 6.25 MG: 6.25 TABLET, FILM COATED ORAL at 08:18

## 2020-05-15 ASSESSMENT — PAIN DESCRIPTION - LOCATION
LOCATION: BACK

## 2020-05-15 ASSESSMENT — PAIN DESCRIPTION - PAIN TYPE
TYPE: ACUTE PAIN
TYPE: ACUTE PAIN

## 2020-05-15 ASSESSMENT — PAIN SCALES - GENERAL
PAINLEVEL_OUTOF10: 10
PAINLEVEL_OUTOF10: 8
PAINLEVEL_OUTOF10: 10
PAINLEVEL_OUTOF10: 7
PAINLEVEL_OUTOF10: 7
PAINLEVEL_OUTOF10: 6
PAINLEVEL_OUTOF10: 0
PAINLEVEL_OUTOF10: 6
PAINLEVEL_OUTOF10: 3
PAINLEVEL_OUTOF10: 0
PAINLEVEL_OUTOF10: 6
PAINLEVEL_OUTOF10: 5

## 2020-05-15 ASSESSMENT — PAIN DESCRIPTION - FREQUENCY
FREQUENCY: CONTINUOUS

## 2020-05-15 ASSESSMENT — PAIN DESCRIPTION - ORIENTATION
ORIENTATION: MID
ORIENTATION: RIGHT;LEFT
ORIENTATION: MID
ORIENTATION: MID;RIGHT
ORIENTATION: MID

## 2020-05-15 ASSESSMENT — PAIN DESCRIPTION - PROGRESSION: CLINICAL_PROGRESSION: NOT CHANGED

## 2020-05-15 ASSESSMENT — PAIN DESCRIPTION - DESCRIPTORS
DESCRIPTORS: SHARP
DESCRIPTORS: SHARP;CONSTANT

## 2020-05-15 ASSESSMENT — PAIN DESCRIPTION - ONSET
ONSET: ON-GOING
ONSET: ON-GOING

## 2020-05-15 NOTE — CONSULTS
Clinical Pharmacist Note:    Pharmacy consulted by Dr. Martinez Ayers to dose warfarin for Afib. Goal INR range: 2-3    INR today: 2.86  Plan to continue home warfarin dose - 2.5mg tonight. Daily INR is ordered. Pharmacy will continue to follow.      Jose Manuel Vega, PharmD  PGY1 Pharmacy Resident  [id number]

## 2020-05-15 NOTE — PROGRESS NOTES
Assessment completed and documented. Pt sitting up in chair. Claims to have more pain now. Does not want tylenol, afraid to take too much. Did take Robaxin and 5 mg oxycodone. The care plan and education has been reviewed and mutually agreed upon with the patient.

## 2020-05-15 NOTE — PROGRESS NOTES
0.  5 mg dilaudid given for pain of 10. Pt almost in tears. Pt sitting up in chair. .. will continue to monitor. . Pt claims to be feeling medication starting to work. ...

## 2020-05-15 NOTE — CONSULTS
current or ex partner: Not on file     Emotionally abused: Not on file     Physically abused: Not on file     Forced sexual activity: Not on file   Other Topics Concern    Not on file   Social History Narrative    Not on file     Family History   Problem Relation Age of Onset    Cancer Father     Asthma Mother     Hypertension Mother     Heart Disease Mother     High Blood Pressure Mother        ROS: Complete 10 point ROS was obtained with positives in HPI, otherwise:  Pt denies fevers, denies chills. Pt has right lower quadrant pain, some nausea which has been improving. PHYSICAL EXAMINATION:  /63   Pulse 67   Temp 97.4 °F (36.3 °C) (Oral)   Resp 16   Ht 5' 8\" (1.727 m)   Wt 216 lb 7 oz (98.2 kg)   SpO2 95%   BMI 32.91 kg/m²   GENERAL:  obese female sitting in her chair, changing positions for comfort  HEENT:  sclera clear and neck supple  NEUROLOGIC:  Awake, alert, oriented to name, place and time. Cranial nerves II-XII are grossly intact. Motor is 5 out of 5 bilaterally. No focal tenderness of spine on palpation. No Tucker's or clonus.     LABORATORY DATA:   CBC with Differential:    Lab Results   Component Value Date    WBC 3.9 05/15/2020    RBC 3.75 05/15/2020    HGB 10.3 05/15/2020    HCT 31.8 05/15/2020     05/15/2020    MCV 84.9 05/15/2020    MCH 27.4 05/15/2020    MCHC 32.3 05/15/2020    RDW 18.0 05/15/2020    SEGSPCT 74.9 02/07/2019    BANDSPCT 1 01/14/2019    LYMPHOPCT 18.4 05/15/2020    MONOPCT 13.2 05/15/2020    BASOPCT 0.4 05/15/2020    MONOSABS 0.5 05/15/2020    LYMPHSABS 0.7 05/15/2020    EOSABS 0.3 05/15/2020    BASOSABS 0.0 05/15/2020     CMP:    Lab Results   Component Value Date     05/15/2020    K 3.6 05/15/2020    K 4.3 05/09/2020     05/15/2020    CO2 22 05/15/2020    BUN 14 05/15/2020    CREATININE 1.0 05/15/2020    GFRAA >60 05/15/2020    AGRATIO 1.0 05/12/2020    LABGLOM 54 05/15/2020    LABGLOM 45 12/06/2018    GLUCOSE 91 05/15/2020    PROT calcifications noted at the origin of   the celiac and superior mesenteric arteries.  Plaque and calcification noted   in the superior mesenteric artery just distal to its take-off from the aorta   with at least 50% narrowing of the SMA.  It appears patent distally.  Renal   arteries appear patent.  Inferior mesenteric artery appears patent.       Bones/Soft Tissues: No acute bony abnormality.           Impression   Atelectasis in the lungs as well as scattered pulmonary nodules measuring up   to 5 mm.  These are similar to those seen on April 24, 2019 study.  No   further follow-up recommended.       A few dilated loops of small bowel within the mid and upper abdomen measuring   up to a 4.3 cm. No discrete transition point visualized.       Multiple loops of small bowel demonstrating pneumatosis within the mid and   upper abdomen.  Additional clusters of gas within the mesentery adjacent to   bowel loops likely within small vessels.  Findings were present in April of 2019 however they appear progressed since then.       Plaque and calcification noted in the superior mesenteric artery just distal   to its take-off from the aorta with at least 50% narrowing of the SMA.  It   otherwise appears patent.       7.5 x 5.7 cm simple left ovarian cyst which is unchanged since 1 year ago. Recommend surgical consultation given size. IMPRESSION/PLAN:  Active Problems:    Pneumatosis coli    Pneumatosis intestinalis    Right flank pain    CT abdomen, pelvis and chest shows degenerative disc disease T11-12 which is not a new finding. No clear nerve root impingement or myelopathic findings on exam.  No neurosurgical intervention indicated. Agree with outpatient pain management referral for possible injections and conservative treatment of her pain. The patient's symptoms, exam, testing and plan of care have been discussed with Dr. Olga Rey. Will sign off. Thank you again for this consultation.     Dinorah Ramires Vi Ortiz  5/15/2020

## 2020-05-15 NOTE — PROGRESS NOTES
108 111*   CO2 26 25 22   BUN 20 15 14   CREATININE 1.1 1.0 1.0   GLUCOSE 125* 139* 91     Hepatic:    Recent Labs     05/13/20  0503   AST 18   ALT 19   BILITOT 0.5   ALKPHOS 105     Amylase:    Lab Results   Component Value Date    AMYLASE 38 12/07/2015     Lipase:    Lab Results   Component Value Date    LIPASE 35.0 05/10/2020    LIPASE 72.0 04/24/2019    LIPASE 78.0 01/14/2019      Mag:    Lab Results   Component Value Date    MG 2.30 05/12/2020    MG 2.30 05/11/2020     Phos:     Lab Results   Component Value Date    PHOS 3.7 05/12/2020    PHOS 3.8 05/11/2020      Coags:   Lab Results   Component Value Date    PROTIME 33.5 05/15/2020    INR 2.86 05/15/2020    APTT 39.1 05/14/2020       Cultures:  Anaerobic culture  No results found for: LABANAE  Fungus stain  No results found for requested labs within last 30 days. Gram stain  No results found for requested labs within last 30 days. Organism  Lab Results   Component Value Date/Time    ORG Staphylococcus coagulase-negative (A) 10/01/2018 08:55 PM    ORG Staphylococcus coagulase negative DNA Detected (A) 10/01/2018 08:55 PM    ORG Staphylococcus coagulase-negative (A) 10/01/2018 08:55 PM     Surgical culture  No results found for: CXSURG  Blood culture 1  No results found for requested labs within last 30 days. Blood culture 2  No results found for requested labs within last 30 days. Fecal occult  No results found for requested labs within last 30 days. GI bacterial pathogens by PCR  No results found for requested labs within last 30 days. C. difficile  No results found for requested labs within last 30 days. Urine culture  Lab Results   Component Value Date    LABURIN  05/11/2020     <10,000 CFU/ml mixed skin/urogenital art.  No further workup       Pathology:  No relevant pathology     Imaging:  I have personally reviewed the following films:    Cta Abdomen Pelvis W Contrast    Result Date: 5/13/2020  EXAMINATION: CTA OF

## 2020-05-15 NOTE — PROGRESS NOTES
[facility]    5/15/2020 12:27 PM        Name: Ahmet Dumas . Admitted: 5/10/2020  Primary Care Provider: Dana Silva MD (Tel: [phone])                        Hospital Course: Admitted with pneumatosis coli incidental finding after coming to hospital for chest and back pain. Subjective: The patient has appetite however currently n.p.o. No acute events overnight. Resting well. Pain control. Diet ok. Labs reviewed  Denies any chest pain sob.      Reviewed interval ancillary notes    Current Medications  oxyCODONE (ROXICODONE) immediate release tablet 5 mg, Q4H PRN    Or  oxyCODONE (ROXICODONE) immediate release tablet 10 mg, Q4H PRN  acetaminophen (TYLENOL) tablet 650 mg, Q6H  methocarbamol (ROBAXIN) tablet 750 mg, 4x Daily PRN  glucose (GLUTOSE) 40 % oral gel 15 g, PRN  dextrose 50 % IV solution, PRN  glucagon (rDNA) injection 1 mg, PRN  dextrose 5 % solution, PRN  insulin lispro (1 Unit Dial) 0-6 Units, Q4H  warfarin (COUMADIN) tablet 2.5 mg, Once per day on Mon Fri    And  warfarin (COUMADIN) tablet 5 mg, Once per day on Sun Tue Wed Thu Sat  acetaminophen (TYLENOL) tablet 1,000 mg, Q8H  HYDROmorphone HCl PF (DILAUDID) injection 0.5 mg, Q4H PRN  dextrose 5 % in lactated ringers infusion, Continuous  lidocaine 4 % external patch 1 patch, Daily  carvedilol (COREG) tablet 6.25 mg, BID WC  aspirin chewable tablet 81 mg, Daily  sodium chloride flush 0.9 % injection 10 mL, 2 times per day  sodium chloride flush 0.9 % injection 10 mL, PRN  potassium chloride 10 mEq/100 mL IVPB (Peripheral Line), PRN  magnesium sulfate 2 g in 50 mL IVPB premix, PRN  ondansetron (ZOFRAN) injection 4 mg, Q6H PRN        Objective:  /63   Pulse 67   Temp 97.4 °F (36.3 °C) (Oral)   Resp 16   Ht 5' 8\" (1.727 m)   Wt 216 lb 7 oz (98.2 kg)   SpO2 95%   BMI 32.91 kg/m²     Intake/Output Summary (Last 24 hours) at 5/15/2020 1227  Last data filed at 5/15/2020 0420  Gross per 24 hour   Intake 3242 ml   Output --   Net 3242 ml      Wt Readings from Last 3 Encounters:   05/15/20 216 lb 7 oz (98.2 kg)   05/08/20 200 lb (90.7 kg)   01/21/20 203 lb 14.4 oz (92.5 kg)       General appearance:  Appears comfortable  Eyes: Sclera clear. Pupils equal.  ENT: Moist oral mucosa. Trachea midline, no adenopathy. Cardiovascular: Regular rhythm, normal S1, S2. No murmur. No edema in lower extremities  Respiratory: Not using accessory muscles. Good inspiratory effort. Clear to auscultation bilaterally, no wheeze or crackles. GI: Abdomen soft, no tenderness, not distended, normal bowel sounds  Musculoskeletal: No cyanosis in digits, neck supple  Neurology: CN 2-12 grossly intact. No speech or motor deficits  Psych: Normal affect. Alert and oriented in time, place and person  Skin: Warm, dry, normal turgor    Labs and Tests:  CBC:   Recent Labs     05/13/20  0503 05/14/20  0534 05/15/20  0534   WBC 4.6 4.3 3.9*   HGB 10.4* 10.4* 10.3*   HCT 32.1* 32.4* 31.8*   MCV 83.9 85.2 84.9    183 180     BMP:    Recent Labs     05/13/20  0503 05/14/20  0534 05/15/20  0534    139 141   K 3.8 3.5 3.6    108 111*   CO2 26 25 22   BUN 20 15 14   CREATININE 1.1 1.0 1.0   GLUCOSE 125* 139* 91     Hepatic:   Recent Labs     05/13/20  0503   AST 18   ALT 19   BILITOT 0.5   ALKPHOS 105         Problem List  Active Problems:    Pneumatosis coli    Pneumatosis intestinalis    Right flank pain  Resolved Problems:    * No resolved hospital problems. *       Assessment & Plan:      1. Pneumatosis Coli Incidental finding.  Recurrent.  Abdominal exam is unremarkable Patient is asymptomatic at this time General surgery following  N.p.o. 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     2.Diabetes mellitus type 2 with hypoglycemia:  morning blood sugar 115 mg/dL, continue with  low-dose sliding scale insulin.      3. Anticoagulant use Continue warfarin.  Cleared by general surgery to take warfarin as in-house. Anticoagulation can be reversed if surgery is needed.     4.Acute kidney injury  Resolved      5. Back pain Chronic.  Physical exam consistent with muscle tenderness.  No midline tenderness.  Continue with Lidoderm patch  Will require PCP follow-up and possibly pain management evaluation as outpatient.     6.Lung nodule Patient is aware that she will require outpatient follow-up in a few months with a repeat CT scan.        Diet: Diet NPO Effective Now Exceptions are: Ice Chips, Sips with Meds  Code:Full Code  DVT PPX lovenox       Thania Ferreira MD   5/15/2020 12:27 PM

## 2020-05-16 LAB
ANION GAP SERPL CALCULATED.3IONS-SCNC: 6 MMOL/L (ref 3–16)
BASOPHILS ABSOLUTE: 0 K/UL (ref 0–0.2)
BASOPHILS RELATIVE PERCENT: 0.3 %
BUN BLDV-MCNC: 12 MG/DL (ref 7–20)
CALCIUM SERPL-MCNC: 8.5 MG/DL (ref 8.3–10.6)
CHLORIDE BLD-SCNC: 112 MMOL/L (ref 99–110)
CO2: 23 MMOL/L (ref 21–32)
CREAT SERPL-MCNC: 1.1 MG/DL (ref 0.6–1.2)
EOSINOPHILS ABSOLUTE: 0.4 K/UL (ref 0–0.6)
EOSINOPHILS RELATIVE PERCENT: 8.8 %
GFR AFRICAN AMERICAN: 59
GFR NON-AFRICAN AMERICAN: 49
GLUCOSE BLD-MCNC: 117 MG/DL (ref 70–99)
GLUCOSE BLD-MCNC: 122 MG/DL (ref 70–99)
GLUCOSE BLD-MCNC: 131 MG/DL (ref 70–99)
GLUCOSE BLD-MCNC: 131 MG/DL (ref 70–99)
GLUCOSE BLD-MCNC: 134 MG/DL (ref 70–99)
GLUCOSE BLD-MCNC: 149 MG/DL (ref 70–99)
GLUCOSE BLD-MCNC: 155 MG/DL (ref 70–99)
HCT VFR BLD CALC: 32.6 % (ref 36–48)
HEMOGLOBIN: 10.5 G/DL (ref 12–16)
INR BLD: 3.36 (ref 0.86–1.14)
LYMPHOCYTES ABSOLUTE: 0.7 K/UL (ref 1–5.1)
LYMPHOCYTES RELATIVE PERCENT: 15.7 %
MCH RBC QN AUTO: 27.4 PG (ref 26–34)
MCHC RBC AUTO-ENTMCNC: 32.3 G/DL (ref 31–36)
MCV RBC AUTO: 84.7 FL (ref 80–100)
MONOCYTES ABSOLUTE: 0.6 K/UL (ref 0–1.3)
MONOCYTES RELATIVE PERCENT: 13 %
NEUTROPHILS ABSOLUTE: 2.7 K/UL (ref 1.7–7.7)
NEUTROPHILS RELATIVE PERCENT: 62.2 %
PDW BLD-RTO: 18.4 % (ref 12.4–15.4)
PERFORMED ON: ABNORMAL
PLATELET # BLD: 175 K/UL (ref 135–450)
PMV BLD AUTO: 8.4 FL (ref 5–10.5)
POTASSIUM SERPL-SCNC: 3.8 MMOL/L (ref 3.5–5.1)
PROTHROMBIN TIME: 39.4 SEC (ref 10–13.2)
RBC # BLD: 3.85 M/UL (ref 4–5.2)
SODIUM BLD-SCNC: 141 MMOL/L (ref 136–145)
WBC # BLD: 4.4 K/UL (ref 4–11)

## 2020-05-16 PROCEDURE — 36415 COLL VENOUS BLD VENIPUNCTURE: CPT

## 2020-05-16 PROCEDURE — 85025 COMPLETE CBC W/AUTO DIFF WBC: CPT

## 2020-05-16 PROCEDURE — 6370000000 HC RX 637 (ALT 250 FOR IP): Performed by: SURGERY

## 2020-05-16 PROCEDURE — 80048 BASIC METABOLIC PNL TOTAL CA: CPT

## 2020-05-16 PROCEDURE — 6360000002 HC RX W HCPCS: Performed by: HOSPITALIST

## 2020-05-16 PROCEDURE — 1200000000 HC SEMI PRIVATE

## 2020-05-16 PROCEDURE — 99232 SBSQ HOSP IP/OBS MODERATE 35: CPT | Performed by: SURGERY

## 2020-05-16 PROCEDURE — 6370000000 HC RX 637 (ALT 250 FOR IP): Performed by: INTERNAL MEDICINE

## 2020-05-16 PROCEDURE — 6370000000 HC RX 637 (ALT 250 FOR IP): Performed by: HOSPITALIST

## 2020-05-16 PROCEDURE — 85610 PROTHROMBIN TIME: CPT

## 2020-05-16 PROCEDURE — 2580000003 HC RX 258: Performed by: INTERNAL MEDICINE

## 2020-05-16 RX ORDER — TORSEMIDE 20 MG/1
20 TABLET ORAL DAILY
Status: DISCONTINUED | OUTPATIENT
Start: 2020-05-16 | End: 2020-05-18 | Stop reason: HOSPADM

## 2020-05-16 RX ORDER — SPIRONOLACTONE 25 MG/1
25 TABLET ORAL DAILY
Status: DISCONTINUED | OUTPATIENT
Start: 2020-05-16 | End: 2020-05-18 | Stop reason: HOSPADM

## 2020-05-16 RX ADMIN — INSULIN LISPRO 1 UNITS: 100 INJECTION, SOLUTION INTRAVENOUS; SUBCUTANEOUS at 00:43

## 2020-05-16 RX ADMIN — HYDROMORPHONE HYDROCHLORIDE 0.5 MG: 1 INJECTION, SOLUTION INTRAMUSCULAR; INTRAVENOUS; SUBCUTANEOUS at 13:05

## 2020-05-16 RX ADMIN — HYDROMORPHONE HYDROCHLORIDE 0.5 MG: 1 INJECTION, SOLUTION INTRAMUSCULAR; INTRAVENOUS; SUBCUTANEOUS at 17:24

## 2020-05-16 RX ADMIN — HYDROMORPHONE HYDROCHLORIDE 0.5 MG: 1 INJECTION, SOLUTION INTRAMUSCULAR; INTRAVENOUS; SUBCUTANEOUS at 08:31

## 2020-05-16 RX ADMIN — INSULIN LISPRO 1 UNITS: 100 INJECTION, SOLUTION INTRAVENOUS; SUBCUTANEOUS at 21:01

## 2020-05-16 RX ADMIN — TORSEMIDE 20 MG: 20 TABLET ORAL at 11:25

## 2020-05-16 RX ADMIN — SPIRONOLACTONE 25 MG: 25 TABLET ORAL at 11:25

## 2020-05-16 RX ADMIN — OXYCODONE 5 MG: 5 TABLET ORAL at 05:05

## 2020-05-16 RX ADMIN — CARVEDILOL 6.25 MG: 6.25 TABLET, FILM COATED ORAL at 17:24

## 2020-05-16 RX ADMIN — Medication 10 ML: at 22:04

## 2020-05-16 RX ADMIN — HYDROMORPHONE HYDROCHLORIDE 0.5 MG: 1 INJECTION, SOLUTION INTRAMUSCULAR; INTRAVENOUS; SUBCUTANEOUS at 22:04

## 2020-05-16 RX ADMIN — CARVEDILOL 6.25 MG: 6.25 TABLET, FILM COATED ORAL at 08:24

## 2020-05-16 RX ADMIN — ASPIRIN 81 MG 81 MG: 81 TABLET ORAL at 08:24

## 2020-05-16 ASSESSMENT — PAIN DESCRIPTION - FREQUENCY
FREQUENCY: CONTINUOUS
FREQUENCY: CONTINUOUS

## 2020-05-16 ASSESSMENT — PAIN DESCRIPTION - ORIENTATION
ORIENTATION: MID;RIGHT
ORIENTATION: MID;RIGHT

## 2020-05-16 ASSESSMENT — PAIN SCALES - WONG BAKER
WONGBAKER_NUMERICALRESPONSE: 0

## 2020-05-16 ASSESSMENT — PAIN DESCRIPTION - ONSET
ONSET: ON-GOING
ONSET: ON-GOING

## 2020-05-16 ASSESSMENT — PAIN DESCRIPTION - LOCATION
LOCATION: BACK
LOCATION: BACK;SHOULDER
LOCATION: BACK

## 2020-05-16 ASSESSMENT — PAIN DESCRIPTION - PAIN TYPE
TYPE: ACUTE PAIN

## 2020-05-16 ASSESSMENT — PAIN DESCRIPTION - DESCRIPTORS
DESCRIPTORS: CONSTANT
DESCRIPTORS: SHARP;CONSTANT

## 2020-05-16 ASSESSMENT — PAIN SCALES - GENERAL
PAINLEVEL_OUTOF10: 7
PAINLEVEL_OUTOF10: 5
PAINLEVEL_OUTOF10: 6
PAINLEVEL_OUTOF10: 5
PAINLEVEL_OUTOF10: 7
PAINLEVEL_OUTOF10: 7
PAINLEVEL_OUTOF10: 6
PAINLEVEL_OUTOF10: 5
PAINLEVEL_OUTOF10: 9

## 2020-05-16 ASSESSMENT — PAIN DESCRIPTION - PROGRESSION: CLINICAL_PROGRESSION: NOT CHANGED

## 2020-05-16 NOTE — PROGRESS NOTES
8:14 AM  Morning assessment complete. Patient with mild nausea this morning. She is sitting up in the chair in good spirits. She is concerned she has not had her water pills and feels swollen. Messaged medical to see if we can resume and see if we can advance diet. The care plan and education has been reviewed and mutually agreed upon with the patient.

## 2020-05-16 NOTE — PROGRESS NOTES
Rubina Jarrell is a 69 y.o. female patient.     CC-abdominal pain    HPI-66 year old female seen in follow up for abdominal pain  Current Facility-Administered Medications   Medication Dose Route Frequency Provider Last Rate Last Dose    warfarin (COUMADIN) daily dosing (placeholder)   Other RX Placeholder Hilary Evans MD        spironolactone (ALDACTONE) tablet 25 mg  1 tablet Oral Daily Thania Ferreira MD        torsemide (DEMADEX) tablet 20 mg  1 tablet Oral Daily Thania Ferreira MD        oxyCODONE (ROXICODONE) immediate release tablet 5 mg  5 mg Oral Q4H PRN Gilda Lam MD   5 mg at 05/16/20 0505    Or    oxyCODONE (ROXICODONE) immediate release tablet 10 mg  10 mg Oral Q4H PRN Gilda Lam MD   10 mg at 05/15/20 2126    acetaminophen (TYLENOL) tablet 650 mg  650 mg Oral Q6H Gilda Lam MD   650 mg at 05/15/20 1540    methocarbamol (ROBAXIN) tablet 750 mg  750 mg Oral 4x Daily PRN Thania Ferreira MD   750 mg at 05/15/20 0818    glucose (GLUTOSE) 40 % oral gel 15 g  15 g Oral PRN Hilary Eavns MD        dextrose 50 % IV solution  12.5 g Intravenous PRN Hilary Evans MD        glucagon (rDNA) injection 1 mg  1 mg Intramuscular PRN Hilary Evans MD        dextrose 5 % solution  100 mL/hr Intravenous PRN Hilary Evans MD        insulin lispro (1 Unit Dial) 0-6 Units  0-6 Units Subcutaneous Q4H Hilray Evans MD   1 Units at 05/16/20 0043    acetaminophen (TYLENOL) tablet 1,000 mg  1,000 mg Oral Q8H Leandro Lo MD   1,000 mg at 05/15/20 0053    HYDROmorphone HCl PF (DILAUDID) injection 0.5 mg  0.5 mg Intravenous Q4H PRN Natasha Malik MD   0.5 mg at 05/16/20 0831    dextrose 5 % in lactated ringers infusion   Intravenous Continuous Natasha Malik  mL/hr at 05/15/20 2127      lidocaine 4 % external patch 1 patch  1 patch Transdermal Daily Natasha Malik MD        carvedilol (COREG) tablet 6.25 mg  6.25 mg Oral BID  Adriel Hong MD   6.25 mg at 05/16/20 Output: Producing urine. Lungs:  Normal effort and normal respiratory rate. Abdomen: Abdomen is soft and non-distended. There is no abdominal tenderness. Neurological: Patient is alert and oriented to person, place and time. Skin:  Warm and dry. Assessment & Plan 69year old female seen in follow up for pneumatosis intestinalis seen on recent CT scans. She is stable from an abdominal standpoint. No complaints of abdominal pain and no abdominal tenderness on physical examination. Pain from thoracic spine is well managed at the current time.        Will start a clear liquid diet today       Gino Miranda MD  5/16/2020

## 2020-05-16 NOTE — PROGRESS NOTES
mg/dL, continue with  low-dose sliding scale insulin.      3. Anticoagulant use Continue warfarin.  Cleared by general surgery to take warfarin as in-house. Anticoagulation can be reversed if surgery is needed.     4.Acute kidney injury  Resolved      5. Back pain Chronic.  Physical exam consistent with muscle tenderness.  No midline tenderness.  Continue with Lidoderm patch  Will require PCP follow-up and possibly pain management evaluation as outpatient.     6.Lung nodule Patient is aware that she will require outpatient follow-up in a few months with a repeat CT scan.        Diet: Diet NPO Effective Now Exceptions are: Ice Chips, Sips with Meds  Code:Full Code  DVT PPX lovenox       Thania Ferreira MD   5/16/2020 8:04 AM

## 2020-05-17 LAB
ANION GAP SERPL CALCULATED.3IONS-SCNC: 6 MMOL/L (ref 3–16)
BASOPHILS ABSOLUTE: 0 K/UL (ref 0–0.2)
BASOPHILS RELATIVE PERCENT: 0.4 %
BUN BLDV-MCNC: 9 MG/DL (ref 7–20)
CALCIUM SERPL-MCNC: 8.5 MG/DL (ref 8.3–10.6)
CHLORIDE BLD-SCNC: 111 MMOL/L (ref 99–110)
CO2: 25 MMOL/L (ref 21–32)
CREAT SERPL-MCNC: 1 MG/DL (ref 0.6–1.2)
EOSINOPHILS ABSOLUTE: 0.4 K/UL (ref 0–0.6)
EOSINOPHILS RELATIVE PERCENT: 8 %
GFR AFRICAN AMERICAN: >60
GFR NON-AFRICAN AMERICAN: 54
GLUCOSE BLD-MCNC: 101 MG/DL (ref 70–99)
GLUCOSE BLD-MCNC: 105 MG/DL (ref 70–99)
GLUCOSE BLD-MCNC: 134 MG/DL (ref 70–99)
GLUCOSE BLD-MCNC: 142 MG/DL (ref 70–99)
GLUCOSE BLD-MCNC: 167 MG/DL (ref 70–99)
GLUCOSE BLD-MCNC: 92 MG/DL (ref 70–99)
GLUCOSE BLD-MCNC: 93 MG/DL (ref 70–99)
HCT VFR BLD CALC: 32.6 % (ref 36–48)
HEMOGLOBIN: 10.6 G/DL (ref 12–16)
INR BLD: 2.76 (ref 0.86–1.14)
LYMPHOCYTES ABSOLUTE: 0.9 K/UL (ref 1–5.1)
LYMPHOCYTES RELATIVE PERCENT: 18.7 %
MCH RBC QN AUTO: 27.4 PG (ref 26–34)
MCHC RBC AUTO-ENTMCNC: 32.4 G/DL (ref 31–36)
MCV RBC AUTO: 84.5 FL (ref 80–100)
MONOCYTES ABSOLUTE: 0.6 K/UL (ref 0–1.3)
MONOCYTES RELATIVE PERCENT: 13.1 %
NEUTROPHILS ABSOLUTE: 2.8 K/UL (ref 1.7–7.7)
NEUTROPHILS RELATIVE PERCENT: 59.8 %
PDW BLD-RTO: 18.2 % (ref 12.4–15.4)
PERFORMED ON: ABNORMAL
PERFORMED ON: NORMAL
PLATELET # BLD: 184 K/UL (ref 135–450)
PMV BLD AUTO: 7.7 FL (ref 5–10.5)
POTASSIUM SERPL-SCNC: 3.4 MMOL/L (ref 3.5–5.1)
PROTHROMBIN TIME: 32.3 SEC (ref 10–13.2)
RBC # BLD: 3.86 M/UL (ref 4–5.2)
SODIUM BLD-SCNC: 142 MMOL/L (ref 136–145)
WBC # BLD: 4.6 K/UL (ref 4–11)

## 2020-05-17 PROCEDURE — 80048 BASIC METABOLIC PNL TOTAL CA: CPT

## 2020-05-17 PROCEDURE — 99232 SBSQ HOSP IP/OBS MODERATE 35: CPT | Performed by: SURGERY

## 2020-05-17 PROCEDURE — 6370000000 HC RX 637 (ALT 250 FOR IP): Performed by: INTERNAL MEDICINE

## 2020-05-17 PROCEDURE — 6360000002 HC RX W HCPCS: Performed by: HOSPITALIST

## 2020-05-17 PROCEDURE — 6370000000 HC RX 637 (ALT 250 FOR IP): Performed by: HOSPITALIST

## 2020-05-17 PROCEDURE — 6370000000 HC RX 637 (ALT 250 FOR IP): Performed by: SURGERY

## 2020-05-17 PROCEDURE — 1200000000 HC SEMI PRIVATE

## 2020-05-17 PROCEDURE — 85610 PROTHROMBIN TIME: CPT

## 2020-05-17 PROCEDURE — 2580000003 HC RX 258: Performed by: INTERNAL MEDICINE

## 2020-05-17 PROCEDURE — 85025 COMPLETE CBC W/AUTO DIFF WBC: CPT

## 2020-05-17 RX ORDER — WARFARIN SODIUM 2.5 MG/1
2.5 TABLET ORAL
Status: COMPLETED | OUTPATIENT
Start: 2020-05-17 | End: 2020-05-17

## 2020-05-17 RX ORDER — POTASSIUM CHLORIDE 20 MEQ/1
40 TABLET, EXTENDED RELEASE ORAL ONCE
Status: COMPLETED | OUTPATIENT
Start: 2020-05-17 | End: 2020-05-17

## 2020-05-17 RX ADMIN — HYDROMORPHONE HYDROCHLORIDE 0.5 MG: 1 INJECTION, SOLUTION INTRAMUSCULAR; INTRAVENOUS; SUBCUTANEOUS at 04:43

## 2020-05-17 RX ADMIN — POTASSIUM CHLORIDE 40 MEQ: 1500 TABLET, EXTENDED RELEASE ORAL at 10:46

## 2020-05-17 RX ADMIN — INSULIN LISPRO 1 UNITS: 100 INJECTION, SOLUTION INTRAVENOUS; SUBCUTANEOUS at 19:59

## 2020-05-17 RX ADMIN — OXYCODONE 10 MG: 5 TABLET ORAL at 15:58

## 2020-05-17 RX ADMIN — TORSEMIDE 20 MG: 20 TABLET ORAL at 07:45

## 2020-05-17 RX ADMIN — ASPIRIN 81 MG 81 MG: 81 TABLET ORAL at 07:45

## 2020-05-17 RX ADMIN — SPIRONOLACTONE 25 MG: 25 TABLET ORAL at 07:45

## 2020-05-17 RX ADMIN — CARVEDILOL 6.25 MG: 6.25 TABLET, FILM COATED ORAL at 15:59

## 2020-05-17 RX ADMIN — WARFARIN SODIUM 2.5 MG: 2.5 TABLET ORAL at 17:26

## 2020-05-17 RX ADMIN — Medication 10 ML: at 22:03

## 2020-05-17 RX ADMIN — OXYCODONE 10 MG: 5 TABLET ORAL at 20:00

## 2020-05-17 RX ADMIN — OXYCODONE 10 MG: 5 TABLET ORAL at 07:49

## 2020-05-17 RX ADMIN — CARVEDILOL 6.25 MG: 6.25 TABLET, FILM COATED ORAL at 07:45

## 2020-05-17 ASSESSMENT — PAIN SCALES - WONG BAKER
WONGBAKER_NUMERICALRESPONSE: 0

## 2020-05-17 ASSESSMENT — PAIN SCALES - GENERAL
PAINLEVEL_OUTOF10: 5
PAINLEVEL_OUTOF10: 4
PAINLEVEL_OUTOF10: 3
PAINLEVEL_OUTOF10: 5
PAINLEVEL_OUTOF10: 9
PAINLEVEL_OUTOF10: 9
PAINLEVEL_OUTOF10: 6
PAINLEVEL_OUTOF10: 7

## 2020-05-17 ASSESSMENT — PAIN DESCRIPTION - LOCATION
LOCATION: BACK

## 2020-05-17 ASSESSMENT — PAIN DESCRIPTION - PAIN TYPE
TYPE: ACUTE PAIN

## 2020-05-17 NOTE — PROGRESS NOTES
temperature source Oral, resp. rate 16, height 5' 8\" (1.727 m), weight 214 lb 3.2 oz (97.2 kg), SpO2 97 %, not currently breastfeeding. Output: Producing urine and producing stool. Lungs:  Normal effort and normal respiratory rate. Abdomen: Abdomen is soft and non-distended. There is no abdominal tenderness. Neurological: Patient is alert and oriented to person, place and time. Skin:  Warm and dry. Assessment & Plan 69year old female seen in follow up for abdominal pain and pneumatosis intestinalis. Doing better today. No complaints of abdominal pain. Has had several episodes of diarrhea. Will advance diet. Check stool for c diff if diarrhea continues.      Hany Brandt MD  5/17/2020

## 2020-05-17 NOTE — PROGRESS NOTES
hours) at 5/17/2020 0851  Last data filed at 5/17/2020 0445  Gross per 24 hour   Intake 855 ml   Output 1425 ml   Net -570 ml      Wt Readings from Last 3 Encounters:   05/17/20 214 lb 3.2 oz (97.2 kg)   05/08/20 200 lb (90.7 kg)   01/21/20 203 lb 14.4 oz (92.5 kg)       General appearance:  Appears comfortable  Eyes: Sclera clear. Pupils equal.  ENT: Moist oral mucosa. Trachea midline, no adenopathy. Cardiovascular: Regular rhythm, normal S1, S2. No murmur. No edema in lower extremities  Respiratory: Not using accessory muscles. Good inspiratory effort. Clear to auscultation bilaterally, no wheeze or crackles. GI: Abdomen soft, no tenderness, not distended, normal bowel sounds  Musculoskeletal: No cyanosis in digits, neck supple  Neurology: CN 2-12 grossly intact. No speech or motor deficits  Psych: Normal affect. Alert and oriented in time, place and person  Skin: Warm, dry, normal turgor    Labs and Tests:  CBC:   Recent Labs     05/15/20  0534 05/16/20  0627 05/17/20  0450   WBC 3.9* 4.4 4.6   HGB 10.3* 10.5* 10.6*   HCT 31.8* 32.6* 32.6*   MCV 84.9 84.7 84.5    175 184     BMP:    Recent Labs     05/15/20  0534 05/16/20  0626 05/17/20  0450    141 142   K 3.6 3.8 3.4*   * 112* 111*   CO2 22 23 25   BUN 14 12 9   CREATININE 1.0 1.1 1.0   GLUCOSE 91 117* 93     Hepatic: No results for input(s): AST, ALT, ALB, BILITOT, ALKPHOS in the last 72 hours. Problem List  Active Problems:    Pneumatosis coli    Pneumatosis intestinalis    Right flank pain  Resolved Problems:    * No resolved hospital problems. *          Assessment & Plan:      1. Pneumatosis Coli Incidental finding.  Recurrent. Abdominal exam is unremarkable Patient is asymptomatic at this time General surgery following  N.p.o. 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     Had a bowel movement yesterday, resume

## 2020-05-17 NOTE — PROGRESS NOTES
Shift assessment complete see flow sheets. Meds per orders see eMAR. Patient alert and oriented x4, ambulating independently in room. Patient stated that her back is still hurting, did not get much relief with previous dose of morphine. PRN morphine given after next available admin time. Patient currently resting in bed with eyes closed at this time. The care plan and education has been reviewed and mutually agreed upon with the patient.      Electronically signed by Blank Leo RN on 5/17/2020 at 12:35 AM

## 2020-05-18 VITALS
OXYGEN SATURATION: 96 % | RESPIRATION RATE: 16 BRPM | TEMPERATURE: 98.3 F | HEART RATE: 102 BPM | HEIGHT: 68 IN | WEIGHT: 214.2 LBS | DIASTOLIC BLOOD PRESSURE: 70 MMHG | BODY MASS INDEX: 32.46 KG/M2 | SYSTOLIC BLOOD PRESSURE: 109 MMHG

## 2020-05-18 LAB
ANION GAP SERPL CALCULATED.3IONS-SCNC: 6 MMOL/L (ref 3–16)
BASOPHILS ABSOLUTE: 0 K/UL (ref 0–0.2)
BASOPHILS RELATIVE PERCENT: 0.3 %
BUN BLDV-MCNC: 14 MG/DL (ref 7–20)
CALCIUM SERPL-MCNC: 8.5 MG/DL (ref 8.3–10.6)
CHLORIDE BLD-SCNC: 109 MMOL/L (ref 99–110)
CO2: 26 MMOL/L (ref 21–32)
CREAT SERPL-MCNC: 1.2 MG/DL (ref 0.6–1.2)
EOSINOPHILS ABSOLUTE: 0.3 K/UL (ref 0–0.6)
EOSINOPHILS RELATIVE PERCENT: 6.9 %
GFR AFRICAN AMERICAN: 53
GFR NON-AFRICAN AMERICAN: 44
GLUCOSE BLD-MCNC: 103 MG/DL (ref 70–99)
GLUCOSE BLD-MCNC: 106 MG/DL (ref 70–99)
GLUCOSE BLD-MCNC: 114 MG/DL (ref 70–99)
GLUCOSE BLD-MCNC: 117 MG/DL (ref 70–99)
GLUCOSE BLD-MCNC: 132 MG/DL (ref 70–99)
HCT VFR BLD CALC: 32.7 % (ref 36–48)
HEMOGLOBIN: 10.6 G/DL (ref 12–16)
INR BLD: 1.84 (ref 0.86–1.14)
LYMPHOCYTES ABSOLUTE: 0.8 K/UL (ref 1–5.1)
LYMPHOCYTES RELATIVE PERCENT: 16.9 %
MCH RBC QN AUTO: 27.3 PG (ref 26–34)
MCHC RBC AUTO-ENTMCNC: 32.4 G/DL (ref 31–36)
MCV RBC AUTO: 84.3 FL (ref 80–100)
MONOCYTES ABSOLUTE: 0.6 K/UL (ref 0–1.3)
MONOCYTES RELATIVE PERCENT: 12.2 %
NEUTROPHILS ABSOLUTE: 3.2 K/UL (ref 1.7–7.7)
NEUTROPHILS RELATIVE PERCENT: 63.7 %
PDW BLD-RTO: 18.9 % (ref 12.4–15.4)
PERFORMED ON: ABNORMAL
PLATELET # BLD: 175 K/UL (ref 135–450)
PMV BLD AUTO: 7.9 FL (ref 5–10.5)
POTASSIUM SERPL-SCNC: 3.9 MMOL/L (ref 3.5–5.1)
PROTHROMBIN TIME: 21.5 SEC (ref 10–13.2)
RBC # BLD: 3.88 M/UL (ref 4–5.2)
SODIUM BLD-SCNC: 141 MMOL/L (ref 136–145)
WBC # BLD: 5 K/UL (ref 4–11)

## 2020-05-18 PROCEDURE — 94760 N-INVAS EAR/PLS OXIMETRY 1: CPT

## 2020-05-18 PROCEDURE — 80048 BASIC METABOLIC PNL TOTAL CA: CPT

## 2020-05-18 PROCEDURE — 85610 PROTHROMBIN TIME: CPT

## 2020-05-18 PROCEDURE — 6370000000 HC RX 637 (ALT 250 FOR IP): Performed by: INTERNAL MEDICINE

## 2020-05-18 PROCEDURE — 99232 SBSQ HOSP IP/OBS MODERATE 35: CPT | Performed by: SURGERY

## 2020-05-18 PROCEDURE — 6370000000 HC RX 637 (ALT 250 FOR IP): Performed by: SURGERY

## 2020-05-18 PROCEDURE — 6370000000 HC RX 637 (ALT 250 FOR IP): Performed by: HOSPITALIST

## 2020-05-18 PROCEDURE — APPNB30 APP NON BILLABLE TIME 0-30 MINS: Performed by: NURSE PRACTITIONER

## 2020-05-18 PROCEDURE — 85025 COMPLETE CBC W/AUTO DIFF WBC: CPT

## 2020-05-18 PROCEDURE — APPSS15 APP SPLIT SHARED TIME 0-15 MINUTES: Performed by: NURSE PRACTITIONER

## 2020-05-18 RX ORDER — WARFARIN SODIUM 2.5 MG/1
2.5 TABLET ORAL
Status: DISCONTINUED | OUTPATIENT
Start: 2020-05-18 | End: 2020-05-18 | Stop reason: HOSPADM

## 2020-05-18 RX ORDER — WARFARIN SODIUM 5 MG/1
5 TABLET ORAL
Status: DISCONTINUED | OUTPATIENT
Start: 2020-05-19 | End: 2020-05-18 | Stop reason: HOSPADM

## 2020-05-18 RX ADMIN — OXYCODONE 10 MG: 5 TABLET ORAL at 00:19

## 2020-05-18 RX ADMIN — CARVEDILOL 6.25 MG: 6.25 TABLET, FILM COATED ORAL at 08:30

## 2020-05-18 RX ADMIN — OXYCODONE 10 MG: 5 TABLET ORAL at 12:32

## 2020-05-18 RX ADMIN — TORSEMIDE 20 MG: 20 TABLET ORAL at 08:30

## 2020-05-18 RX ADMIN — ASPIRIN 81 MG 81 MG: 81 TABLET ORAL at 08:30

## 2020-05-18 RX ADMIN — SPIRONOLACTONE 25 MG: 25 TABLET ORAL at 08:30

## 2020-05-18 RX ADMIN — OXYCODONE 10 MG: 5 TABLET ORAL at 08:30

## 2020-05-18 RX ADMIN — OXYCODONE 10 MG: 5 TABLET ORAL at 04:28

## 2020-05-18 ASSESSMENT — PAIN SCALES - GENERAL
PAINLEVEL_OUTOF10: 7
PAINLEVEL_OUTOF10: 0
PAINLEVEL_OUTOF10: 6
PAINLEVEL_OUTOF10: 7
PAINLEVEL_OUTOF10: 5

## 2020-05-18 ASSESSMENT — PAIN DESCRIPTION - PROGRESSION: CLINICAL_PROGRESSION: GRADUALLY WORSENING

## 2020-05-18 ASSESSMENT — PAIN DESCRIPTION - PAIN TYPE
TYPE: CHRONIC PAIN

## 2020-05-18 ASSESSMENT — PAIN DESCRIPTION - LOCATION
LOCATION: BACK

## 2020-05-18 ASSESSMENT — PAIN DESCRIPTION - ORIENTATION
ORIENTATION: MID
ORIENTATION: MID

## 2020-05-18 ASSESSMENT — PAIN DESCRIPTION - FREQUENCY
FREQUENCY: CONTINUOUS
FREQUENCY: CONTINUOUS

## 2020-05-18 NOTE — PLAN OF CARE
Problem: Pain:  Description: Pain management should include both nonpharmacologic and pharmacologic interventions. Goal: Pain level will decrease  Description: Pain level will decrease  Outcome: Ongoing  Goal: Control of acute pain  Description: Control of acute pain  Outcome: Ongoing  Goal: Control of chronic pain  Description: Control of chronic pain  Outcome: Ongoing     Problem: Falls - Risk of:  Goal: Will remain free from falls  Description: Will remain free from falls  Outcome: Ongoing  Goal: Absence of physical injury  Description: Absence of physical injury  Outcome: Ongoing     Problem:  Bowel Function - Altered:  Goal: Bowel elimination is within specified parameters  Description: Bowel elimination is within specified parameters  Outcome: Ongoing     Problem: Fluid Volume - Deficit:  Goal: Absence of fluid volume deficit signs and symptoms  Description: Absence of fluid volume deficit signs and symptoms  Outcome: Ongoing  Goal: Electrolytes within specified parameters  Description: Electrolytes within specified parameters  Outcome: Ongoing     Problem: Nutrition  Goal: Optimal nutrition therapy  5/17/2020 0035 by Michael Balbuena RN  Outcome: Ongoing  5/16/2020 1154 by Justyn Storm RD, LD  Outcome: Ongoing
Problem: Pain:  Goal: Pain level will decrease  Description: Pain level will decrease  5/12/2020 1146 by Good Bennett RN  Outcome: Ongoing  5/12/2020 0415 by Gustabo Moya RN  Outcome: Ongoing  Goal: Control of acute pain  Description: Control of acute pain  5/12/2020 1146 by Good Bennett RN  Outcome: Ongoing  5/12/2020 0415 by Gustabo Moya RN  Outcome: Ongoing  Goal: Control of chronic pain  Description: Control of chronic pain  5/12/2020 1146 by Good Bennett RN  Outcome: Ongoing  5/12/2020 0415 by Gustabo Moya RN  Outcome: Ongoing     Problem: Falls - Risk of:  Goal: Will remain free from falls  Description: Will remain free from falls  5/12/2020 1146 by Good Bennett RN  Outcome: Ongoing  5/12/2020 0415 by Gustabo Moya RN  Outcome: Ongoing  Goal: Absence of physical injury  Description: Absence of physical injury  5/12/2020 1146 by Good Bennett RN  Outcome: Ongoing  5/12/2020 0415 by Gustabo Moya RN  Outcome: Ongoing     Problem:  Bowel Function - Altered:  Goal: Bowel elimination is within specified parameters  Description: Bowel elimination is within specified parameters  5/12/2020 1146 by Good Bennett RN  Outcome: Ongoing  5/12/2020 0415 by Gustabo Moya RN  Outcome: Ongoing     Problem: Fluid Volume - Deficit:  Goal: Absence of fluid volume deficit signs and symptoms  Description: Absence of fluid volume deficit signs and symptoms  5/12/2020 1146 by Good Bennett RN  Outcome: Ongoing  5/12/2020 0415 by Gustabo Moya RN  Outcome: Ongoing  Goal: Electrolytes within specified parameters  Description: Electrolytes within specified parameters  5/12/2020 1146 by Good Bennett RN  Outcome: Ongoing  5/12/2020 0415 by Gustabo Moya RN  Outcome: Ongoing
Problem: Pain:  Goal: Pain level will decrease  Description: Pain level will decrease  5/17/2020 2010 by Meño Vazquez RN  Outcome: Ongoing  5/17/2020 1824 by Eleanor Nolan RN  Outcome: Ongoing  Goal: Control of acute pain  Description: Control of acute pain  5/17/2020 2010 by Meño Vazquez RN  Outcome: Ongoing  5/17/2020 1824 by Eleanor Nolan RN  Outcome: Ongoing  Goal: Control of chronic pain  Description: Control of chronic pain  5/17/2020 2010 by Meño Vazquez RN  Outcome: Ongoing  5/17/2020 1824 by Eleanor Nolan RN  Outcome: Ongoing     Problem: Falls - Risk of:  Goal: Will remain free from falls  Description: Will remain free from falls  5/17/2020 2010 by Meño Vazquez RN  Outcome: Ongoing  5/17/2020 1824 by Eleanor Nolan RN  Outcome: Ongoing  Goal: Absence of physical injury  Description: Absence of physical injury  5/17/2020 2010 by Meño Vazquez RN  Outcome: Ongoing  5/17/2020 1824 by Eleanor Nolan RN  Outcome: Ongoing     Problem:  Bowel Function - Altered:  Goal: Bowel elimination is within specified parameters  Description: Bowel elimination is within specified parameters  5/17/2020 2010 by Meño Vazquez RN  Outcome: Ongoing  5/17/2020 1824 by Eelanor Nolan RN  Outcome: Ongoing
Problem: Pain:  Goal: Pain level will decrease  Description: Pain level will decrease  Outcome: Met This Shift  Goal: Control of acute pain  Description: Control of acute pain  Outcome: Met This Shift  Goal: Control of chronic pain  Description: Control of chronic pain  Outcome: Met This Shift     Problem: Falls - Risk of:  Goal: Will remain free from falls  Description: Will remain free from falls  Outcome: Met This Shift  Goal: Absence of physical injury  Description: Absence of physical injury  Outcome: Met This Shift     Problem:  Bowel Function - Altered:  Goal: Bowel elimination is within specified parameters  Description: Bowel elimination is within specified parameters  Outcome: Ongoing     Problem: Fluid Volume - Deficit:  Goal: Absence of fluid volume deficit signs and symptoms  Description: Absence of fluid volume deficit signs and symptoms  Outcome: Met This Shift  Goal: Electrolytes within specified parameters  Description: Electrolytes within specified parameters  Outcome: Met This Shift
Problem: Pain:  Goal: Pain level will decrease  Description: Pain level will decrease  Outcome: Ongoing  Goal: Control of acute pain  Description: Control of acute pain  Outcome: Ongoing  Goal: Control of chronic pain  Description: Control of chronic pain  Outcome: Ongoing
Problem: Pain:  Goal: Pain level will decrease  Description: Pain level will decrease  Outcome: Ongoing  Goal: Control of acute pain  Description: Control of acute pain  Outcome: Ongoing  Goal: Control of chronic pain  Description: Control of chronic pain  Outcome: Ongoing     Problem: Falls - Risk of:  Goal: Will remain free from falls  Description: Will remain free from falls  Outcome: Ongoing  Goal: Absence of physical injury  Description: Absence of physical injury  Outcome: Ongoing     Problem: Bowel Function - Altered:  Goal: Bowel elimination is within specified parameters  Description: Bowel elimination is within specified parameters  Outcome: Ongoing     Problem: Fluid Volume - Deficit:  Goal: Absence of fluid volume deficit signs and symptoms  Description: Absence of fluid volume deficit signs and symptoms  Outcome: Ongoing  Goal: Electrolytes within specified parameters  Description: Electrolytes within specified parameters  Outcome: Ongoing       The care plan and education has been reviewed and mutually agreed upon with the patient.
correct electrolytes  4. Activity as tolerated  5. PRN analgesics and antiemetics--minimizing narcotics as tolerated  6. DVT prophylaxis with Lovenox  7. Management of medical comorbid etiologies per primary team and consulting services    EDUCATION:  Educated patient on plan of care and disease process--all questions answered. Plans discussed with patient and nursing. Reviewed and discussed with Dr. Sherif Packer consult to follow.       Signed:  SOLEDAD Shaw - CNP  5/11/2020 8:55 AM
to appropriate level of care  Description: Discharged to appropriate level of care  Outcome: Ongoing     Problem: Sensory Perception - Impaired:  Goal: Ability to maintain a stable neurologic state will improve  Description: Ability to maintain a stable neurologic state will improve  Outcome: Ongoing

## 2020-05-18 NOTE — PROGRESS NOTES
Assessment completed and documented. Pt sitting up in chair and has eaten breakfast. Pt claims to feel better. The care plan and education has been reviewed and mutually agreed upon with the patient. 10 mg oxycodone given for pain of 7. No other needs at this time. Pt claims to be ready to go home.

## 2020-05-18 NOTE — PROGRESS NOTES
Pt dressed and ready to go. .. waiting on hospitalist to round. .. no needs at this time. Call light in reach  Will monitor.

## 2020-05-18 NOTE — PROGRESS NOTES
ALT, ALB, BILITOT, ALKPHOS in the last 72 hours. Amylase:    Lab Results   Component Value Date    AMYLASE 38 12/07/2015     Lipase:    Lab Results   Component Value Date    LIPASE 35.0 05/10/2020    LIPASE 72.0 04/24/2019    LIPASE 78.0 01/14/2019      Mag:    Lab Results   Component Value Date    MG 2.30 05/12/2020    MG 2.30 05/11/2020     Phos:     Lab Results   Component Value Date    PHOS 3.7 05/12/2020    PHOS 3.8 05/11/2020      Coags:   Lab Results   Component Value Date    PROTIME 21.5 05/18/2020    INR 1.84 05/18/2020    APTT 39.1 05/14/2020       Cultures:  Anaerobic culture  No results found for: LABANAE  Fungus stain  No results found for requested labs within last 30 days. Gram stain  No results found for requested labs within last 30 days. Organism  Lab Results   Component Value Date/Time    ORG Staphylococcus coagulase-negative (A) 10/01/2018 08:55 PM    ORG Staphylococcus coagulase negative DNA Detected (A) 10/01/2018 08:55 PM    ORG Staphylococcus coagulase-negative (A) 10/01/2018 08:55 PM     Surgical culture  No results found for: CXSURG  Blood culture 1  No results found for requested labs within last 30 days. Blood culture 2  No results found for requested labs within last 30 days. Fecal occult  No results found for requested labs within last 30 days. GI bacterial pathogens by PCR  No results found for requested labs within last 30 days. C. difficile  No results found for requested labs within last 30 days. Urine culture  Lab Results   Component Value Date    LABURIN  05/11/2020     <10,000 CFU/ml mixed skin/urogenital art. No further workup       Pathology:  No relevant pathology     Imaging:  I have personally reviewed the following films:    No results found.     Scheduled Meds:   warfarin  2.5 mg Oral Once per day on Mon Fri    And    [START ON 5/19/2020] warfarin  5 mg Oral Once per day on Sun Tue Wed Thu Sat    spironolactone  25 mg Oral Daily    face diagnostic evaluation on this patient. I have interviewed and examined the patient and I agree with the assessment above. In summary, my findings and plan are the following:   Ms. Penelope Chavarria is a 69 y.o. female who presents with   Pneumatosis intestinalis, small bowel  Back pain  Arteriolosclerosis  Atrial fibrillation on Coumadin  Chronic kidney disease  CAD, status-post CABG    Plan:  1. Abdominal pain resolved  2. Tolerating diet  3. Activity as tolerated  4. Recommend outpatient followup with pain specialist regarding back pain  5. May anticoagulate from surgical standpoint  6. Defer management of remainder of medical comorbidities to primary and consulting teasm  7. No further acute general surgery issues, can discharge from surgical standpoint and follow in office as needed    Nataliia Minaya.  Adelaida Owen MD, FACS  5/18/2020  9:36 PM

## 2020-05-18 NOTE — DISCHARGE SUMMARY
analgesics. . Referred her to pain physician outpatient was provided on dc      Patient had a CTA abdomen 5/13/20 showed   1. Small bowel pneumatosis with gas in the adjacent mesentery and mesenteric  veins.  Sigmoid colon pneumatosis.  This is not significantly changed from  05/10/2020 but worsened from 04/24/2019.  The etiology is unclear but likely  benign. 2. Moderate atherosclerotic vascular disease of the abdominal aorta with up  to approximately 50% stenosis of the proximal superior mesenteric artery.  Up  to approximately 80% stenosis of the proximal left renal artery. 3. Small bilateral pleural effusions and small bibasilar pulmonary nodules  measuring up to 5 mm without significant change from 04/24/2019.  No  follow-up recommended. 4. 8.6 cm simple appearing left adnexal cyst without significant change. Further evaluation with MRI and surgical consultation is recommended--she was advised to follow-up with her PCP for this. Invasive procedures:  none    Discharge Diagnoses: Active Problems:    Pneumatosis coli    Pneumatosis intestinalis    Right flank pain    Atrial fibrillation on Coumadin  Chronic kidney disease  CAD, status-post CABG        Physical Exam: /70   Pulse 102   Temp 98.3 °F (36.8 °C) (Oral)   Resp 16   Ht 5' 8\" (1.727 m)   Wt 214 lb 3.2 oz (97.2 kg)   SpO2 96%   BMI 32.57 kg/m²   Gen/overall appearance: Not in acute distress. Alert. Head: Normocephalic, atraumatic  Eyes: EOMI, good acuity  ENT:- Oral mucosa moist  Neck: No JVD, thyromegaly  CVS: Nml S1S2, no MRG, RRR  Pulm: Clear bilaterally. No crackles/wheezes  Gastrointestinal: Soft, NT/ND, +BS  Musculoskeletal: No edema. Warm  Neuro: No focal deficit. Moves extremity spontaneously. Psychiatry: Appropriate affect. Not agitated. Skin: Warm, dry with normal turgor. No rash        Significant Diagnostic Studies:    See above        Treatments: As above.       Discharge Medications:     Medication List CHANGE how you take these medications    * FreeStyle Lite Mei  1 Device by Does not apply route 4 times daily Patient to check blood sugar 4 times a day and PRN, she is treated with multiple daily injections of insulin that require correction dosing ;A1C 8.1 ; ICD code-E11.65 ,Z79.4  What changed:    · when to take this  · additional instructions     * Embrace Pro Glucose Meter Mei  1 Device by Does not apply route 4 times daily  What changed:  Another medication with the same name was changed. Make sure you understand how and when to take each. * This list has 2 medication(s) that are the same as other medications prescribed for you. Read the directions carefully, and ask your doctor or other care provider to review them with you.             CONTINUE taking these medications    albuterol sulfate  (90 Base) MCG/ACT inhaler  Commonly known as:  ProAir HFA  Inhale 2 puffs into the lungs every 6 hours as needed for Wheezing     aspirin 81 MG chewable tablet  Take 1 tablet by mouth daily     BD Pen Needle Pauline U/F 32G X 4 MM Misc  Generic drug:  Insulin Pen Needle  USE 1 PEN NEEDLE FOUR TIMES A DAY     blood glucose test strips strip  Commonly known as:  FREESTYLE LITE  Test 4 times daily     carvedilol 6.25 MG tablet  Commonly known as:  COREG  Take 1 tablet by mouth 2 times daily (with meals)     cyanocobalamin 500 MCG tablet  Take 1 tablet by mouth daily     exenatide 10 MCG/0.04ML injection  Commonly known as:  Byetta 10 MCG Pen  Inject 0.04 mLs into the skin 2 times daily (with meals)     FreeStyle Lancets Misc  1 each by Does not apply route 4 times daily     HumaLOG KwikPen 100 UNIT/ML Sopn  Generic drug:  insulin lispro (1 Unit Dial)  TAKE AS INSTRUCTED BY YOUR PRESCRIBER     insulin glargine 100 UNIT/ML injection pen  Commonly known as:  LANTUS;BASAGLAR  50 unit AM and 24 unit bedtime     methocarbamol 750 MG tablet  Commonly known as:  Robaxin-750  Take 1 tablet by mouth 4 times daily as

## 2020-05-19 ENCOUNTER — CARE COORDINATION (OUTPATIENT)
Dept: CASE MANAGEMENT | Age: 75
End: 2020-05-19

## 2020-05-19 ENCOUNTER — TELEPHONE (OUTPATIENT)
Dept: PHARMACY | Facility: CLINIC | Age: 75
End: 2020-05-19

## 2020-05-19 ENCOUNTER — TELEPHONE (OUTPATIENT)
Dept: FAMILY MEDICINE CLINIC | Age: 75
End: 2020-05-19

## 2020-05-19 PROCEDURE — 1111F DSCHRG MED/CURRENT MED MERGE: CPT

## 2020-05-19 RX ORDER — INSULIN GLARGINE 100 [IU]/ML
50 INJECTION, SOLUTION SUBCUTANEOUS EVERY MORNING
COMMUNITY
End: 2020-05-22 | Stop reason: DRUGHIGH

## 2020-05-19 RX ORDER — OXYCODONE HYDROCHLORIDE 5 MG/1
5 TABLET ORAL EVERY 6 HOURS PRN
COMMUNITY
End: 2020-05-22 | Stop reason: SDUPTHER

## 2020-05-19 NOTE — TELEPHONE ENCOUNTER
Pt called today with request for hospital f/u  Was offered an appointment for VV, refused appointment. Patient would like Christian Renteria MD to see in office. pharmacy confirmed in [place] [id number].     Patient was made aware of e-visits via [employer]

## 2020-05-20 ENCOUNTER — TELEPHONE (OUTPATIENT)
Dept: FAMILY MEDICINE CLINIC | Age: 75
End: 2020-05-20

## 2020-05-22 ENCOUNTER — OFFICE VISIT (OUTPATIENT)
Dept: FAMILY MEDICINE CLINIC | Age: 75
End: 2020-05-22
Payer: MEDICARE

## 2020-05-22 ENCOUNTER — CARE COORDINATION (OUTPATIENT)
Dept: CASE MANAGEMENT | Age: 75
End: 2020-05-22

## 2020-05-22 VITALS
OXYGEN SATURATION: 94 % | RESPIRATION RATE: 16 BRPM | DIASTOLIC BLOOD PRESSURE: 70 MMHG | TEMPERATURE: 98.2 F | WEIGHT: 208.25 LBS | BODY MASS INDEX: 31.66 KG/M2 | SYSTOLIC BLOOD PRESSURE: 108 MMHG | HEART RATE: 96 BPM

## 2020-05-22 PROCEDURE — G8510 SCR DEP NEG, NO PLAN REQD: HCPCS | Performed by: FAMILY MEDICINE

## 2020-05-22 PROCEDURE — 99496 TRANSJ CARE MGMT HIGH F2F 7D: CPT | Performed by: FAMILY MEDICINE

## 2020-05-22 RX ORDER — OXYCODONE HYDROCHLORIDE 5 MG/1
5 TABLET ORAL EVERY 6 HOURS PRN
Qty: 100 TABLET | Refills: 0 | Status: SHIPPED | OUTPATIENT
Start: 2020-05-22 | End: 2020-12-21 | Stop reason: SDUPTHER

## 2020-05-22 RX ORDER — SPIRONOLACTONE 25 MG/1
TABLET ORAL
Qty: 90 TABLET | Refills: 3 | Status: SHIPPED | OUTPATIENT
Start: 2020-05-22 | End: 2021-01-04

## 2020-05-22 RX ORDER — ALBUTEROL SULFATE 2.5 MG/3ML
2.5 SOLUTION RESPIRATORY (INHALATION) EVERY 6 HOURS PRN
Qty: 120 EACH | Refills: 3 | Status: SHIPPED | OUTPATIENT
Start: 2020-05-22 | End: 2020-06-02 | Stop reason: SDUPTHER

## 2020-05-22 RX ORDER — METHOCARBAMOL 750 MG/1
750 TABLET, FILM COATED ORAL 2 TIMES DAILY
Qty: 40 TABLET | Refills: 1 | Status: SHIPPED | OUTPATIENT
Start: 2020-05-22 | End: 2020-05-29 | Stop reason: SDUPTHER

## 2020-05-22 RX ORDER — TORSEMIDE 20 MG/1
TABLET ORAL
Qty: 90 TABLET | Refills: 1 | Status: SHIPPED | OUTPATIENT
Start: 2020-05-22 | End: 2020-09-16

## 2020-05-22 RX ORDER — INSULIN GLARGINE 100 [IU]/ML
30 INJECTION, SOLUTION SUBCUTANEOUS EVERY MORNING
Qty: 1 VIAL | Status: SHIPPED | COMMUNITY
Start: 2020-05-22 | End: 2020-07-17 | Stop reason: SDUPTHER

## 2020-05-22 NOTE — PROGRESS NOTES
Subjective:      Patient ID: Montez Mar is a 69 y.o. female. CC: Patient presents for hospital follow-up. HPI pt is here for a hospital follow up. Patient was originally in emergency room May 8 with severe right flank and back pain. She then went back to the hospital May 10 with again having severe right flank and back pain. She was found to have pneumatosis intestinalis and surgical consult was made. They did not think this was causing her issues. Her symptoms are mostly consistent with thoracic back pain. During hospitalization she received Robaxin and Lidoderm patches to that site. But at time of discharge her discomfort had significantly improved although she still having problems. She denies any GI upset associated with any of this. She has been having more asthma flareups since early spring and request a nebulizer. She was given an inhaler in the past and she does not feel this is been effective. Patient just had her INR done yesterday and it was 1.7 but she had been off Coumadin during the hospitalization. More concerning is that she is been having some low blood sugars especially since she returned home from the hospital.  She has no symptoms of these hypoglycemic episodes but her blood sugars are ranging in the last week from 50- 110. She has decreased her insulin down to 50 units in the morning and she is taking [employer] just once a day. She is not using a sliding scale coverage. Hemoglobin A1c during hospitalization was at 6.3. She is also been having more swelling of her legs which she thought started prior to hospitalization. Care Coordinator phone contact documented in [address] note for 5-19-20.      Review of Systems  Patient Active Problem List   Diagnosis    Long term current use of anticoagulant    Hyperlipidemia    Essential hypertension    Generalized osteoarthrosis, involving multiple sites    Eosinophilic gastritis    Paroxysmal SVT (supraventricular tachycardia) (HCC)    tablet 4    blood glucose test strips (FREESTYLE LITE) strip Test 4 times daily 100 strip 5    Blood Glucose Monitoring Suppl (EMBRACE PRO GLUCOSE METER) GAETANO 1 Device by Does not apply route 4 times daily 1 Device 0    HUMALOG KWIKPEN 100 UNIT/ML SOPN TAKE AS INSTRUCTED BY YOUR PRESCRIBER 15 mL 8    torsemide (DEMADEX) 20 MG tablet TAKE 1 TABLET DAILY 90 tablet 1    carvedilol (COREG) 6.25 MG tablet Take 1 tablet by mouth 2 times daily (with meals) 180 tablet 1    exenatide (BYETTA 10 MCG PEN) 10 MCG/0.04ML injection Inject 0.04 mLs into the skin 2 times daily (with meals) 3 pen 3    nystatin (MYCOSTATIN) 048929 UNIT/ML suspension TAKE ONE TEASPOONFUL BY MOUTH FOUR TIMES A DAY FOR 5 DAYS 1 Bottle 2    rosuvastatin (CRESTOR) 20 MG tablet TAKE 1 TABLET DAILY 90 tablet 3    potassium chloride (KLOR-CON M) 10 MEQ extended release tablet TAKE 1 TABLET DAILY 90 tablet 3    spironolactone (ALDACTONE) 25 MG tablet TAKE 1 TABLET DAILY 90 tablet 3    aspirin 81 MG chewable tablet Take 1 tablet by mouth daily 30 tablet 3    BD PEN NEEDLE JANNET U/F 32G X 4 MM MISC USE 1 PEN NEEDLE FOUR TIMES A  each 3    vitamin B-12 500 MCG tablet Take 1 tablet by mouth daily 30 tablet 3    Blood Glucose Monitoring Suppl (FREESTYLE LITE) GAETANO 1 Device by Does not apply route 4 times daily Patient to check blood sugar 4 times a day and PRN, she is treated with multiple daily injections of insulin that require correction dosing ;A1C 8.1 ; ICD code-E11.65 ,Z79.4 (Patient taking differently: 1 Device by Does not apply route 2 times daily ) 1 Device 0       Allergies   Allergen Reactions    Accofvlb-Epsmenc-Stxpnn [Fluocinolone] Shortness Of Breath    Ciprofloxacin Shortness Of Breath    Diovan [Valsartan] Shortness Of Breath    Flagyl [Metronidazole] Shortness Of Breath     Has taken diflucan at home 12/7/15    Metformin And Related [Metformin And Related] Shortness Of Breath    Benazepril      Other reaction(s): Not she was off her medications during hospitalization she should maintain her normal dose of Coumadin and report pro time in 1 week as she does have self-monitoring. For the leg swelling, recommended that patient take 2 tablets of Demadex daily for 1 week and then resume 1 tablet daily. Patient was given written instruction of the above  RTC 1 month    Medical decision making of high complexity. Please note that this chart was generated using Dragon dictation software. Although every effort was made to ensure the accuracy of this automated transcription, some errors in transcription may have occurred.

## 2020-05-29 RX ORDER — EXENATIDE 250 UG/ML
10 INJECTION SUBCUTANEOUS DAILY
Qty: 3 PEN | Refills: 3 | Status: ON HOLD
Start: 2020-05-29 | End: 2021-03-03 | Stop reason: SDUPTHER

## 2020-05-29 RX ORDER — METHOCARBAMOL 750 MG/1
750 TABLET, FILM COATED ORAL 2 TIMES DAILY
Qty: 60 TABLET | Refills: 1 | Status: SHIPPED | OUTPATIENT
Start: 2020-05-29 | End: 2020-12-15

## 2020-06-02 RX ORDER — ALBUTEROL SULFATE 2.5 MG/3ML
2.5 SOLUTION RESPIRATORY (INHALATION) EVERY 6 HOURS PRN
Qty: 120 EACH | Refills: 3 | Status: SHIPPED | OUTPATIENT
Start: 2020-06-02

## 2020-06-02 NOTE — TELEPHONE ENCOUNTER
Per ES med was denied as this is cover through medicare A or B.  Med needs to send to a local pharmacy

## 2020-06-02 NOTE — TELEPHONE ENCOUNTER
Pt. Recently received a prescription for a  nebulizer kit (breathing machine) and can't get it filled at any local pharmacy. The pharmacy stated they must go though a medical supply store. Please call pt. Back with advice on where to go.

## 2020-06-05 ENCOUNTER — CARE COORDINATION (OUTPATIENT)
Dept: CASE MANAGEMENT | Age: 75
End: 2020-06-05

## 2020-06-10 ENCOUNTER — CARE COORDINATION (OUTPATIENT)
Dept: CASE MANAGEMENT | Age: 75
End: 2020-06-10

## 2020-06-10 NOTE — CARE COORDINATION
who are sick.  Put distance between yourself and other people if COVID-19 is spreading in your community.  Clean and disinfect frequently touched surfaces.  Avoid all cruise travel and non-essential air travel.  Call your healthcare professional if you have concerns about COVID-19 and your underlying condition or if you are sick. For more information on steps you can take to protect yourself, see CDC's How to Protect Yourself      Pt states feels great, no issues or concerns. Agreed to more CTC f/u calls. Plan for follow-up call in 5-7 days based on severity of symptoms and risk factors.       Follow Up  Future Appointments   Date Time Provider Tawana Young   6/15/2020  4:30 PM Marcelino Wyatt REMOTE TRANSMISSION FF Cardio [facility]   6/19/2020  8:15 AM Eusebio Medeiros MD [facility] FARRUKH Beal RN

## 2020-06-12 LAB — INR BLD: 2.2

## 2020-06-15 ENCOUNTER — NURSE ONLY (OUTPATIENT)
Dept: CARDIOLOGY CLINIC | Age: 75
End: 2020-06-15
Payer: MEDICARE

## 2020-06-15 PROCEDURE — 93298 REM INTERROG DEV EVAL SCRMS: CPT | Performed by: INTERNAL MEDICINE

## 2020-06-15 PROCEDURE — G2066 INTER DEVC REMOTE 30D: HCPCS | Performed by: INTERNAL MEDICINE

## 2020-06-15 NOTE — LETTER
[address] [facility] [phone]  [address] [place] [address] [phone]    Pacemaker/Defibrillator Clinic          06/15/20        Adam Marie  [address] [address]        Dear Adam Marie    This letter is to inform you that we received the transmission from your monitor at home that checks your implanted heart device. The next date your monitor will automatically transmit will be 7-16-20. If your report needs attention we will notify you. Your device and monitor are wireless and most transmit cellularly, but please periodically check your monitor is still plugged in to the electrical outlet. If you still use the telephone land line to send please ensure the connection to the phone abel is secure. This will help to ensure successful automatic transmissions in the future. Also, the monitor needs to be close to you while sleeping at night. Please be aware that the remote device transmission sites are periodically monitored only during regular business hours during which simultaneous in-office device clinics are being run. If your transmission requires attention, we will contact you as soon as possible. Thank you.             [facility]

## 2020-06-16 ENCOUNTER — ANTI-COAG VISIT (OUTPATIENT)
Dept: FAMILY MEDICINE CLINIC | Age: 75
End: 2020-06-16

## 2020-06-18 ENCOUNTER — CARE COORDINATION (OUTPATIENT)
Dept: CASE MANAGEMENT | Age: 75
End: 2020-06-18

## 2020-06-19 ENCOUNTER — OFFICE VISIT (OUTPATIENT)
Dept: FAMILY MEDICINE CLINIC | Age: 75
End: 2020-06-19
Payer: MEDICARE

## 2020-06-19 VITALS
TEMPERATURE: 97.7 F | HEART RATE: 96 BPM | BODY MASS INDEX: 30.16 KG/M2 | OXYGEN SATURATION: 96 % | RESPIRATION RATE: 16 BRPM | SYSTOLIC BLOOD PRESSURE: 102 MMHG | DIASTOLIC BLOOD PRESSURE: 66 MMHG | WEIGHT: 198.38 LBS

## 2020-06-19 PROCEDURE — 99214 OFFICE O/P EST MOD 30 MIN: CPT | Performed by: FAMILY MEDICINE

## 2020-06-19 NOTE — PROGRESS NOTES
Subjective:      Patient ID: Heide Cui is a 69 y.o. female. CC: Patient presents for re-evaluation of chronic health problems including diabetes mellitus, eosinophilic gastritis, back pain, pneumatosis intestinalis and heart failure. Carlee BROWN Pt is here for a follow up in accompaniment of . They are concerned that sometimes at suppertime her blood sugars ranged tween 1 . This is not every day. Her morning blood sugars are ranging tween 70 up to 130. She does have occasional hypoglycemic reaction early in the day. Patient is also having lower GI upset and she does have a history of recurrent eosinophilic gastritis typically in the summer months. Her other big complaint is that she is having significant back discomfort. She describes the back pain started in the nape of her neck extending down to the mid scapular area bilaterally. She has a lot of difficulty doing activities. With a higher dose of water pill her swelling problems resolved rather quickly and have stayed at [place] [id number]. She denies any chest pain or shortness of breath symptoms. She has had no more right rib cage pain secondary to the pneumatosis intestinalis. Her appetite is stable and she is having normal bowel movements. Eye exam current (within one year): Yes    Checks sugars at home: yes  Home blood sugar records: patient tests 4 time(s) per day  Any episodes of hypoglycemia?  No    Current medication use: taking as prescribed  Medication side effects: none     Current diet: in general, a \"healthy\" diet    Current exercise:not active    Review of Systems  Patient Active Problem List   Diagnosis    Long term current use of anticoagulant    Hyperlipidemia    Essential hypertension    Generalized osteoarthrosis, involving multiple sites    Eosinophilic gastritis    Paroxysmal SVT (supraventricular tachycardia) (HCC)    B12 deficiency    History of pulmonary embolism    Multiple pulmonary nodules    Type 2 diabetes Vomiting 30 tablet 0    albuterol sulfate HFA (PROAIR HFA) 108 (90 Base) MCG/ACT inhaler Inhale 2 puffs into the lungs every 6 hours as needed for Wheezing 3 Inhaler 0    warfarin (COUMADIN) 5 MG tablet TAKE 1 TABLET DAILY 100 tablet 4    montelukast (SINGULAIR) 10 MG tablet TAKE 1 TABLET NIGHTLY 90 tablet 4    blood glucose test strips (FREESTYLE LITE) strip Test 4 times daily 100 strip 5    Blood Glucose Monitoring Suppl (EMBRACE PRO GLUCOSE METER) GAETANO 1 Device by Does not apply route 4 times daily 1 Device 0    HUMALOG KWIKPEN 100 UNIT/ML SOPN TAKE AS INSTRUCTED BY YOUR PRESCRIBER 15 mL 8    carvedilol (COREG) 6.25 MG tablet Take 1 tablet by mouth 2 times daily (with meals) 180 tablet 1    nystatin (MYCOSTATIN) 003096 UNIT/ML suspension TAKE ONE TEASPOONFUL BY MOUTH FOUR TIMES A DAY FOR 5 DAYS 1 Bottle 2    rosuvastatin (CRESTOR) 20 MG tablet TAKE 1 TABLET DAILY 90 tablet 3    potassium chloride (KLOR-CON M) 10 MEQ extended release tablet TAKE 1 TABLET DAILY 90 tablet 3    aspirin 81 MG chewable tablet Take 1 tablet by mouth daily 30 tablet 3    BD PEN NEEDLE JANNET U/F 32G X 4 MM MISC USE 1 PEN NEEDLE FOUR TIMES A  each 3    vitamin B-12 500 MCG tablet Take 1 tablet by mouth daily 30 tablet 3    Blood Glucose Monitoring Suppl (FREESTYLE LITE) GAETANO 1 Device by Does not apply route 4 times daily Patient to check blood sugar 4 times a day and PRN, she is treated with multiple daily injections of insulin that require correction dosing ;A1C 8.1 ; ICD code-E11.65 ,Z79.4 (Patient taking differently: 1 Device by Does not apply route 2 times daily ) 1 Device 0       Allergies   Allergen Reactions    Ahqrhtkr-Ftjiewz-Kmfvjl [Fluocinolone] Shortness Of Breath    Ciprofloxacin Shortness Of Breath    Diovan [Valsartan] Shortness Of Breath    Flagyl [Metronidazole] Shortness Of Breath     Has taken diflucan at home 12/7/15    Metformin And Related [Metformin And Related] Shortness Of Breath    Benazepril      Other reaction(s): Not Recorded    Morphine      Bad reaction. \"makes her feel horrible\".  Saxagliptin      Other reaction(s): Not Recorded    Levaquin [Levofloxacin] Rash       Social History     Tobacco Use    Smoking status: Never Smoker    Smokeless tobacco: Never Used   Substance Use Topics    Alcohol use: No       /66 (Site: Right Upper Arm, Position: Sitting, Cuff Size: Large Adult)   Pulse 96   Temp 97.7 °F (36.5 °C) (Tympanic)   Resp 16   Wt 198 lb 6 oz (90 kg)   SpO2 96%   BMI 30.16 kg/m²     Objective:   Physical Exam  Constitutional:       General: She is not in acute distress. Appearance: She is well-developed. Neck:      Vascular: No carotid bruit. Cardiovascular:      Rate and Rhythm: Normal rate and regular rhythm. Pulses:           Dorsalis pedis pulses are 2+ on the right side and 2+ on the left side. Posterior tibial pulses are 2+ on the right side and 2+ on the left side. Heart sounds: Murmur present. Systolic (Right sternal border) murmur present with a grade of 2/6. No friction rub. No gallop. Pulmonary:      Effort: Pulmonary effort is normal.      Breath sounds: Normal breath sounds. Abdominal:      General: Bowel sounds are normal. There is no distension. Palpations: Abdomen is soft. There is no hepatomegaly. Tenderness: There is no abdominal tenderness. Musculoskeletal:      Thoracic back: She exhibits decreased range of motion, tenderness (Intrascapular area bilaterally) and spasm (Right paraspinal muscle start in the interscapular down to the lumbar). She exhibits no bony tenderness and no deformity. Right lower leg: No edema. Left lower leg: Edema (1 plus left ankle edema) present. Right foot: Normal.      Left foot: Normal.   Skin:     Findings: No rash. Neurological:      Mental Status: She is alert. Sensory: Sensation is intact. No sensory deficit. Motor: Motor function is intact. Deep Tendon Reflexes: Reflexes are normal and symmetric. Comments: 12 point monofilament test normal    Psychiatric:         Behavior: Behavior is cooperative. Assessment:      Vineet Nelson was seen today for follow-up. Diagnoses and all orders for this visit:    Type 2 diabetes mellitus with hyperglycemia, with long-term current use of insulin (HCC)    Eosinophilic gastritis  -     CBC WITH AUTO DIFFERENTIAL; Future    Thoracic degenerative disc disease    Pneumatosis intestinalis    Chronic combined systolic (congestive) and diastolic (congestive) heart failure (HCC)    Other orders  -     diclofenac sodium (VOLTAREN) 1 % GEL; Apply 2 g topically 4 times daily as needed for Pain            Plan:      Blood sugars overall solids are maintained well. Recommended she maintain her current dose of insulin and Trulicity with occasional use of Humalog. Check laboratory profile in regards to eosinophilic gastritis and she may need a prednisone taper  Clinically the congestive heart failure symptoms have significantly improved and go ahead and maintain her current treatment plan. For a thoracic back pain she is not a candidate for anti-inflammatory medications as she is on Coumadin. We will begin a trial of diclofenac gel to be applied 2-3 times a day. RTC 3 months    Please note that this chart was generated using Dragon dictation software. Although every effort was made to ensure the accuracy of this automated transcription, some errors in transcription may have occurred.

## 2020-06-23 LAB
BASOPHILS ABSOLUTE: 29 CELLS/UL (ref 0–200)
BASOPHILS RELATIVE PERCENT: 0.7 %
EOSINOPHILS ABSOLUTE: 340 CELLS/UL (ref 15–500)
EOSINOPHILS RELATIVE PERCENT: 8.1 %
HCT VFR BLD CALC: 34.2 % (ref 35–45)
HEMOGLOBIN: 10.7 G/DL (ref 11.7–15.5)
LYMPHOCYTES ABSOLUTE: 924 CELLS/UL (ref 850–3900)
LYMPHOCYTES RELATIVE PERCENT: 22 %
MCH RBC QN AUTO: 27.2 PG (ref 27–33)
MCHC RBC AUTO-ENTMCNC: 31.3 G/DL (ref 32–36)
MCV RBC AUTO: 86.8 FL (ref 80–100)
MONOCYTES ABSOLUTE: 500 CELLS/UL (ref 200–950)
MONOCYTES RELATIVE PERCENT: 11.9 %
NEUTROPHILS ABSOLUTE: 2407 CELLS/UL (ref 1500–7800)
PDW BLD-RTO: 15.7 % (ref 11–15)
PLATELET # BLD: 250 THOUSAND/UL (ref 140–400)
PMV BLD AUTO: 11.5 FL (ref 7.5–12.5)
RBC # BLD: 3.94 MILLION/UL (ref 3.8–5.1)
SEGMENTED NEUTROPHILS RELATIVE PERCENT: 57.3 %
WBC # BLD: 4.2 THOUSAND/UL (ref 3.8–10.8)

## 2020-06-26 ENCOUNTER — ANTI-COAG VISIT (OUTPATIENT)
Dept: FAMILY MEDICINE CLINIC | Age: 75
End: 2020-06-26

## 2020-06-26 LAB — INR BLD: 2.1

## 2020-07-09 LAB — INR BLD: 1.9

## 2020-07-10 ENCOUNTER — ANTI-COAG VISIT (OUTPATIENT)
Dept: FAMILY MEDICINE CLINIC | Age: 75
End: 2020-07-10

## 2020-07-10 ENCOUNTER — TELEPHONE (OUTPATIENT)
Dept: FAMILY MEDICINE CLINIC | Age: 75
End: 2020-07-10

## 2020-07-10 RX ORDER — CARVEDILOL 6.25 MG/1
TABLET ORAL
Qty: 180 TABLET | Refills: 0 | Status: SHIPPED | OUTPATIENT
Start: 2020-07-10 | End: 2020-09-23 | Stop reason: SDUPTHER

## 2020-07-10 RX ORDER — INSULIN GLARGINE 100 [IU]/ML
INJECTION, SOLUTION SUBCUTANEOUS
Qty: 90 ML | Refills: 1 | Status: ON HOLD | OUTPATIENT
Start: 2020-07-10 | End: 2021-03-10 | Stop reason: SDUPTHER

## 2020-07-10 NOTE — TELEPHONE ENCOUNTER
Pt's  advise but is concern as she end up in the hospital last time she went without this for that long.   Please advise

## 2020-07-10 NOTE — TELEPHONE ENCOUNTER
Pt  chidi called in on behalf of pt. Stated that she has a colonoscopy scheduled for 7/28. They want her to stop taking warfarin 5 days prior. They will need a substitute. Please f/u with pt .

## 2020-07-10 NOTE — TELEPHONE ENCOUNTER
She probably could get by with just stopping the Coumadin for 3 days as her levels typically drop rather quickly

## 2020-07-16 ENCOUNTER — NURSE ONLY (OUTPATIENT)
Dept: CARDIOLOGY CLINIC | Age: 75
End: 2020-07-16
Payer: MEDICARE

## 2020-07-16 PROCEDURE — G2066 INTER DEVC REMOTE 30D: HCPCS | Performed by: INTERNAL MEDICINE

## 2020-07-16 PROCEDURE — 93298 REM INTERROG DEV EVAL SCRMS: CPT | Performed by: INTERNAL MEDICINE

## 2020-07-16 NOTE — LETTER
[facility] [phone]  [address] [place] [address] [phone]    Pacemaker/Defibrillator Clinic          07/14/20        Kateryna Casarez  [address] [address]        Dear Kateryna Casarez    This letter is to inform you that we received the transmission from your monitor at home that checks your implanted heart device. The next date your monitor will automatically transmit will be 8-17-20. If your report needs attention we will notify you. Your device and monitor are wireless and most transmit cellularly, but please periodically check your monitor is still plugged in to the electrical outlet. If you still use the telephone land line to send please ensure the connection to the phone abel is secure. This will help to ensure successful automatic transmissions in the future. Also, the monitor needs to be close to you while sleeping at night. Please be aware that the remote device transmission sites are periodically monitored only during regular business hours during which simultaneous in-office device clinics are being run. If your transmission requires attention, we will contact you as soon as possible. Thank you.             [address]

## 2020-07-17 ENCOUNTER — ANESTHESIA EVENT (OUTPATIENT)
Dept: ENDOSCOPY | Age: 75
End: 2020-07-17
Payer: MEDICARE

## 2020-07-17 NOTE — PROGRESS NOTES
Name_______________________________________Printed:____________________  Date and time of surgery___7/28/2020  0700_____________________Arrival Time:__0530  FEC______________   1. The instructions given regarding when and if a patient needs to stop oral intake prior to surgery varies. Follow the specific instructions you were given                  ___Nothing to eat or to drink after Midnight the night before. _XXX___Endoscopy patient follow your DRS instructions-generally you will be doing a part of the prep after Midnight                   ____Carbo loading or ERAS instructions will be given to select patients-if you have been given those instructions -please do the following                           The evening before your surgery after dinner before midnight drink 40 ounces of gatorade. If you are diabetic use sugar free. The morning of surgery drink 40 ounces of water. This needs to be finished 3 hours prior to your surgery start time. 2. Take the following pills with a small sip of water on the morning of surgery______inhaler, ______________coreg_______________________________                  Do not take blood pressure medications ending in pril or sartan the haim prior to surgery or the morning of surgery_   3. Aspirin, Ibuprofen, Advil, Naproxen, Vitamin E and other Anti-inflammatory products and supplements should be stopped for 5 -7days before surgery or as directed by your physician. 4. Check with your Doctor regarding stopping Plavix, Coumadin,Eliquis, Lovenox,Effient,Pradaxa,Xarelto, Fragmin or other blood thinners and follow their instructions. 5. Do not smoke, and do not drink any alcoholic beverages 24 hours prior to surgery. This includes NA Beer. Refrain from the usage of any recreational drugs. 6. You may brush your teeth and gargle the morning of surgery. DO NOT SWALLOW WATER   7.  You MUST make arrangements for a responsible adult to stay on site while you are here and take you home after your surgery. You will not be allowed to leave alone or drive yourself home. It is strongly suggested someone stay with you the first 24 hrs. Your surgery will be cancelled if you do not have a ride home. 8. A parent/legal guardian must accompany a child scheduled for surgery and plan to stay at the hospital until the child is discharged. Please do not bring other children with you. 9. Please wear simple, loose fitting clothing to the hospital.  Michelle Wesley not bring valuables (money, credit cards, checkbooks, etc.) Do not wear any makeup (including no eye makeup) or nail polish on your fingers or toes. 10. DO NOT wear any jewelry or piercings on day of surgery. All body piercing jewelry must be removed. 11. If you have ___dentures, they will be removed before going to the OR; we will provide you a container. If you wear ___contact lenses or ___glasses, they will be removed; please bring a case for them. 12. Please see your family doctor/pediatrician for a history & physical and/or concerning medications. Bring any test results/reports from your physician's office. PCP__________________Phone___________H&P Appt. Date________             13 If you  have a Living Will and Durable Power of  for Healthcare, please bring in a copy. 15. Notify your Surgeon if you develop any illness between now and surgery  time, cough, cold, fever, sore throat, nausea, vomiting, etc.  Please notify your surgeon if you experience dizziness, shortness of breath or blurred vision between now & the time of your surgery             15. DO NOT shave your operative site 96 hours prior to surgery. For face & neck surgery, men may use an electric razor 48 hours prior to surgery. 16. Shower the night before or morning of surgery using an antibacterial soap or as you have been instructed.              17. To provide excellent care visitors will be for the procedure. Pain management is NO VISITOR policyThe patients ride is expected to remain in the car with a cell phone for communication. If the ride is leaving the hospital grounds please make sure they are back in time for pickup. Have the patient inform the staff on arrival what their rides plans are while the patient is in the facility. At the MAIN there is one visitor allowed. Please note that the visitor policy is subject to change.

## 2020-07-20 RX ORDER — BLOOD-GLUCOSE METER
KIT MISCELLANEOUS
Qty: 100 STRIP | Refills: 4 | Status: SHIPPED | OUTPATIENT
Start: 2020-07-20 | End: 2020-11-24

## 2020-07-21 RX ORDER — POTASSIUM CHLORIDE 750 MG/1
TABLET, EXTENDED RELEASE ORAL
Qty: 90 TABLET | Refills: 0 | Status: SHIPPED | OUTPATIENT
Start: 2020-07-21 | End: 2020-09-23 | Stop reason: SDUPTHER

## 2020-07-21 RX ORDER — ROSUVASTATIN CALCIUM 20 MG/1
TABLET, COATED ORAL
Qty: 90 TABLET | Refills: 0 | Status: SHIPPED | OUTPATIENT
Start: 2020-07-21 | End: 2020-09-23 | Stop reason: SDUPTHER

## 2020-07-22 ENCOUNTER — TELEPHONE (OUTPATIENT)
Dept: ADMINISTRATIVE | Age: 75
End: 2020-07-22

## 2020-07-22 NOTE — TELEPHONE ENCOUNTER
I received a PA for SurveypalYLE LITE TEST STRIPS. There is already an APPROVED PA on file for these good through 02/05/2021 Ref# [id number]. This was scanned in media on 02/07/2020. Not sure if it is through patients Medical insurance which is Rosajenn Jones or her Rx insurance which is [address]

## 2020-07-23 LAB — INR BLD: 2.4

## 2020-07-24 ENCOUNTER — TELEPHONE (OUTPATIENT)
Dept: FAMILY MEDICINE CLINIC | Age: 75
End: 2020-07-24

## 2020-07-24 ENCOUNTER — ANTI-COAG VISIT (OUTPATIENT)
Dept: FAMILY MEDICINE CLINIC | Age: 75
End: 2020-07-24

## 2020-07-24 NOTE — TELEPHONE ENCOUNTER
Kristen Monzon is calling with medicare regarding a PA  Needing to expedite this so the patient can get their medication      blood glucose test strips (FREESTYLE LITE) strip    I have no number to give him to get in contact with you. Patient is going to be out of these by tomorrow. We sent it over on 7/21/2020. Jose and medicare is stating that they haven't received any of this information yet.     Please advise         LAVINIA Josue dept for [facility] [phone]

## 2020-07-24 NOTE — TELEPHONE ENCOUNTER
Patients  states that his wife test her 4 times a day and he needs a pa approved. It was sent on 7/21/20 to the PA dept.       Patient is out of her test strips    blood glucose test strips (FREESTYLE LITE) strip  100 strip  4  7/20/2020      Sig: USE TO TEST FOUR TIMES A DAY

## 2020-07-24 NOTE — TELEPHONE ENCOUNTER
Pt advise there is an approval on file till 2/5/202. Pt stated that her  spent 1 hour on the phone trying to sort things out with her insurance and they got things straighten out. Pt was also advise of her INR results 7/23/20 2.4.  Per WM per is to continue same dose and recheck 1 month

## 2020-07-27 NOTE — TELEPHONE ENCOUNTER
Per pharmacy patient picked up strips and ran the insurance through her part B plan. .    Please advise

## 2020-07-28 ENCOUNTER — ANESTHESIA (OUTPATIENT)
Dept: ENDOSCOPY | Age: 75
End: 2020-07-28
Payer: MEDICARE

## 2020-08-03 ENCOUNTER — OFFICE VISIT (OUTPATIENT)
Dept: PRIMARY CARE CLINIC | Age: 75
End: 2020-08-03
Payer: MEDICARE

## 2020-08-03 PROCEDURE — 99211 OFF/OP EST MAY X REQ PHY/QHP: CPT | Performed by: NURSE PRACTITIONER

## 2020-08-03 NOTE — PATIENT INSTRUCTIONS

## 2020-08-03 NOTE — PROGRESS NOTES
Nataliia Colon received a viral test for COVID-19. They were educated on isolation and quarantine as appropriate. For any symptoms, they were directed to seek care from their PCP, given contact information to establish with a doctor, directed to an urgent care or the emergency room.

## 2020-08-04 LAB — SARS-COV-2, NAA: NOT DETECTED

## 2020-08-06 ENCOUNTER — ANTI-COAG VISIT (OUTPATIENT)
Dept: FAMILY MEDICINE CLINIC | Age: 75
End: 2020-08-06

## 2020-08-06 ENCOUNTER — TELEPHONE (OUTPATIENT)
Dept: FAMILY MEDICINE CLINIC | Age: 75
End: 2020-08-06

## 2020-08-06 LAB — INR BLD: 1.5

## 2020-08-07 ENCOUNTER — HOSPITAL ENCOUNTER (OUTPATIENT)
Age: 75
Setting detail: OUTPATIENT SURGERY
Discharge: HOME | End: 2020-08-07
Attending: INTERNAL MEDICINE | Admitting: INTERNAL MEDICINE
Payer: MEDICARE

## 2020-08-07 VITALS
SYSTOLIC BLOOD PRESSURE: 93 MMHG | OXYGEN SATURATION: 100 % | DIASTOLIC BLOOD PRESSURE: 58 MMHG | RESPIRATION RATE: 6 BRPM

## 2020-08-07 VITALS
WEIGHT: 198 LBS | TEMPERATURE: 97.3 F | DIASTOLIC BLOOD PRESSURE: 71 MMHG | SYSTOLIC BLOOD PRESSURE: 115 MMHG | HEART RATE: 86 BPM | HEIGHT: 68 IN | BODY MASS INDEX: 30.01 KG/M2 | RESPIRATION RATE: 22 BRPM | OXYGEN SATURATION: 99 %

## 2020-08-07 LAB
GLUCOSE BLD-MCNC: 81 MG/DL (ref 70–99)
GLUCOSE BLD-MCNC: 93 MG/DL (ref 70–99)
PERFORMED ON: NORMAL
PERFORMED ON: NORMAL

## 2020-08-07 PROCEDURE — 7100000010 HC PHASE II RECOVERY - FIRST 15 MIN: Performed by: INTERNAL MEDICINE

## 2020-08-07 PROCEDURE — 7100000011 HC PHASE II RECOVERY - ADDTL 15 MIN: Performed by: INTERNAL MEDICINE

## 2020-08-07 PROCEDURE — 2500000003 HC RX 250 WO HCPCS: Performed by: NURSE ANESTHETIST, CERTIFIED REGISTERED

## 2020-08-07 PROCEDURE — 3700000000 HC ANESTHESIA ATTENDED CARE: Performed by: INTERNAL MEDICINE

## 2020-08-07 PROCEDURE — 6360000002 HC RX W HCPCS: Performed by: NURSE ANESTHETIST, CERTIFIED REGISTERED

## 2020-08-07 PROCEDURE — 7100000000 HC PACU RECOVERY - FIRST 15 MIN: Performed by: INTERNAL MEDICINE

## 2020-08-07 PROCEDURE — 3700000001 HC ADD 15 MINUTES (ANESTHESIA): Performed by: INTERNAL MEDICINE

## 2020-08-07 PROCEDURE — 2709999900 HC NON-CHARGEABLE SUPPLY: Performed by: INTERNAL MEDICINE

## 2020-08-07 PROCEDURE — 3609027000 HC COLONOSCOPY: Performed by: INTERNAL MEDICINE

## 2020-08-07 PROCEDURE — 2580000003 HC RX 258: Performed by: NURSE ANESTHETIST, CERTIFIED REGISTERED

## 2020-08-07 PROCEDURE — 7100000001 HC PACU RECOVERY - ADDTL 15 MIN: Performed by: INTERNAL MEDICINE

## 2020-08-07 RX ORDER — PROPOFOL 10 MG/ML
INJECTION, EMULSION INTRAVENOUS PRN
Status: DISCONTINUED | OUTPATIENT
Start: 2020-08-07 | End: 2020-08-07 | Stop reason: SDUPTHER

## 2020-08-07 RX ORDER — LIDOCAINE HYDROCHLORIDE 20 MG/ML
INJECTION, SOLUTION INFILTRATION; PERINEURAL PRN
Status: DISCONTINUED | OUTPATIENT
Start: 2020-08-07 | End: 2020-08-07 | Stop reason: SDUPTHER

## 2020-08-07 RX ORDER — SODIUM CHLORIDE 9 MG/ML
INJECTION, SOLUTION INTRAVENOUS CONTINUOUS PRN
Status: DISCONTINUED | OUTPATIENT
Start: 2020-08-07 | End: 2020-08-07 | Stop reason: SDUPTHER

## 2020-08-07 RX ORDER — PROPOFOL 10 MG/ML
INJECTION, EMULSION INTRAVENOUS CONTINUOUS PRN
Status: DISCONTINUED | OUTPATIENT
Start: 2020-08-07 | End: 2020-08-07 | Stop reason: SDUPTHER

## 2020-08-07 RX ADMIN — LIDOCAINE HYDROCHLORIDE 60 MG: 20 INJECTION, SOLUTION INFILTRATION; PERINEURAL at 09:55

## 2020-08-07 RX ADMIN — SODIUM CHLORIDE: 9 INJECTION, SOLUTION INTRAVENOUS at 09:50

## 2020-08-07 RX ADMIN — SODIUM CHLORIDE: 9 INJECTION, SOLUTION INTRAVENOUS at 09:58

## 2020-08-07 RX ADMIN — PROPOFOL 60 MG: 10 INJECTION, EMULSION INTRAVENOUS at 09:54

## 2020-08-07 RX ADMIN — SODIUM CHLORIDE: 9 INJECTION, SOLUTION INTRAVENOUS at 10:01

## 2020-08-07 RX ADMIN — PROPOFOL 120 MCG/KG/MIN: 10 INJECTION, EMULSION INTRAVENOUS at 09:55

## 2020-08-07 ASSESSMENT — PAIN - FUNCTIONAL ASSESSMENT: PAIN_FUNCTIONAL_ASSESSMENT: 0-10

## 2020-08-07 ASSESSMENT — PULMONARY FUNCTION TESTS
PIF_VALUE: 2
PIF_VALUE: 9
PIF_VALUE: 2
PIF_VALUE: 12
PIF_VALUE: 11
PIF_VALUE: 0
PIF_VALUE: 0
PIF_VALUE: 1
PIF_VALUE: 2
PIF_VALUE: 13
PIF_VALUE: 0
PIF_VALUE: 8
PIF_VALUE: 16
PIF_VALUE: 2
PIF_VALUE: 1
PIF_VALUE: 0
PIF_VALUE: 5

## 2020-08-07 NOTE — PROGRESS NOTES
Pt has 20g IV in place to right hand, no visible redness or swelling, dressing CDI. IV currently infusing. Will continue to monitor.

## 2020-08-07 NOTE — PROGRESS NOTES
Discharge instructions reviewed with patient/. All home medications have been reviewed, questions answered and patient verbalized understanding. Discharge instructions signed. Pt dc'd per wheelchair. Patient discharged home with belongings.  taking stable pt home.

## 2020-08-07 NOTE — ANESTHESIA POSTPROCEDURE EVALUATION
Department of Anesthesiology  Postprocedure Note    Patient: Harika Alatorre  MRN: [id number]  YOB: 1945  Date of evaluation: 8/7/2020  Time:  2:05 PM     Procedure Summary     Date:  08/07/20 Room / Location:  [address] [place] [address]    Anesthesia Start:  3154 Anesthesia Stop:  2253    Procedure:  COLONOSCOPY DIAGNOSTIC (N/A ) Diagnosis:       (PNEUMATOSIS INTESTINALIS OF SMALL INTESTINE K63.89)      (HISTORY OF COLON POLYPS Z86.010)    Surgeon:  Kwadwo Walker MD Responsible Provider:  Mahesh Nelson MD    Anesthesia Type:  MAC ASA Status:  4          Anesthesia Type: MAC    Edward Phase I: Edward Score: 10    Edward Phase II: Edward Score: 10    Last vitals: Reviewed and per EMR flowsheets.        Anesthesia Post Evaluation    Patient location during evaluation: PACU  Complications: no  Cardiovascular status: hemodynamically stable  Respiratory status: acceptable

## 2020-08-07 NOTE — ANESTHESIA PRE PROCEDURE
Department of Anesthesiology  Preprocedure Note       Name:  Janes Clark   Age:  69 y.o.  :  1945                                          MRN:  [id number]         Date:  2020      Surgeon: Mary Samayoa    Procedure: COLONOSCOPY DIAGNOSTIC (N/A ), ablation    Medications prior to admission:   Prior to Admission medications    Medication Sig Start Date End Date Taking?  Authorizing Provider   rosuvastatin (CRESTOR) 20 MG tablet TAKE 1 TABLET DAILY 20   Yudi Hargrove MD   potassium chloride (KLOR-CON M) 10 MEQ extended release tablet TAKE 1 TABLET DAILY 20   Yudi Hargrove MD   blood glucose test strips (FREESTYLE LITE) strip USE TO TEST FOUR TIMES A DAY 20   Yudi Hargrove MD   carvedilol (COREG) 6.25 MG tablet TAKE 1 TABLET TWICE A DAY WITH MEALS  Patient taking differently: 2 times daily  7/10/20   Yudi Hagrrove MD   insulin glargine (LANTUS SOLOSTAR) 100 UNIT/ML injection pen INJECT 50 UNITS IN THE MORNING AND 24 UNITS AT BEDTIME  Patient taking differently: every morning INJECT 35 UNITS 7/10/20   Yudi Hargrove MD   albuterol (PROVENTIL) (2.5 MG/3ML) 0.083% nebulizer solution Take 3 mLs by nebulization every 6 hours as needed for Wheezing 20   Yudi Hargrove MD   methocarbamol (ROBAXIN-750) 750 MG tablet Take 1 tablet by mouth 2 times daily  Patient taking differently: Take 750 mg by mouth as needed  20   Yudi Hargrove MD   exenatide (BYETTA 10 MCG PEN) 10 MCG/0.04ML injection Inject 0.04 mLs into the skin daily  Patient taking differently: Inject 10 mcg into the skin 2 times daily (with meals)  20   Yudi Hargrove MD   torsemide (DEMADEX) 20 MG tablet TAKE 1 TABLET DAILY 20   Yudi Hargrove MD   spironolactone (ALDACTONE) 25 MG tablet TAKE 1 TABLET DAILY 20   Yudi Hargrove MD   polyethylene glycol (GLYCOLAX) 17 GM/SCOOP powder Take 17 g by mouth daily    Historical Provider, MD   omeprazole (PRILOSEC) 20 MG delayed release capsule Take 20 mg by mouth daily    Historical Provider, MD   FreeStyle Lancets MISC 1 each by Does not apply route 4 times daily 4/29/20   Marianna Leventhal, MD   ondansetron (ZOFRAN) 4 MG tablet Take 1 tablet by mouth 3 times daily as needed for Nausea or Vomiting 3/26/20   Marianna Leventhal, MD   albuterol sulfate HFA (PROAIR HFA) 108 (90 Base) MCG/ACT inhaler Inhale 2 puffs into the lungs every 6 hours as needed for Wheezing 3/12/20   Marianna Leventhal, MD   warfarin (COUMADIN) 5 MG tablet TAKE 1 TABLET DAILY 2/26/20   Jamilah Menendez MD   montelukast (SINGULAIR) 10 MG tablet TAKE 1 TABLET NIGHTLY 2/26/20   Jamilah Menendez MD   Blood Glucose Monitoring Suppl (EMBRACE PRO GLUCOSE METER) GAETANO 1 Device by Does not apply route 4 times daily 2/4/20   Marianna Leventhal, MD   HUMALOG KWIKPEN 100 UNIT/ML SOPN TAKE AS INSTRUCTED BY YOUR PRESCRIBER  Patient taking differently: Only if high blood sugar-has not been using 12/30/19   Marianna Leventhal, MD   nystatin (MYCOSTATIN) 631484 UNIT/ML suspension TAKE ONE TEASPOONFUL BY MOUTH FOUR TIMES A DAY FOR 5 DAYS 11/20/19   Marianna Leventhal, MD   aspirin 81 MG chewable tablet Take 1 tablet by mouth daily 6/7/19   Marianna Leventhal, MD   BD PEN NEEDLE JANNET U/F 32G X 4 MM MISC USE 1 PEN NEEDLE FOUR TIMES A DAY 3/22/19   Marianna Leventhal, MD   vitamin B-12 500 MCG tablet Take 1 tablet by mouth daily 10/12/18   Suhail Bell MD   Blood Glucose Monitoring Suppl (FREESTYLE LITE) GAETANO 1 Device by Does not apply route 4 times daily Patient to check blood sugar 4 times a day and PRN, she is treated with multiple daily injections of insulin that require correction dosing ;A1C 8.1 ; ICD code-E11.65 ,Z79.4  Patient taking differently: 1 Device by Does not apply route 2 times daily  9/4/18   SOLEDAD Soto - CNP       Current medications:    No current outpatient medications on file. No current facility-administered medications for this visit.         Allergies: Eosinophilia     Hemoptysis     HIGH CHOLESTEROL     Hx of blood clots     Hypertension     Irregular heart beat     Other specified gastritis without mention of hemorrhage     Palpitations     Skin cancer     basal and squamous    Type II or unspecified type diabetes mellitus without mention of complication, not stated as uncontrolled        Past Surgical History:        Procedure Laterality Date    BRONCHOSCOPY  07/18/2016    Dr. Olive Seymour - brushings from [address] [place] [address],[facility]  08/16/2018    Dr. Brittany Rockwell  02/07/2017    Dr. Caruso Spindle - sigmoid diverticulosis, polypectomies x3    COLONOSCOPY  01/17/2014    Dr. Caruso Spindle - sigmoid diverticulosis, polypectomies x3, internal hemorrhoids    COLONOSCOPY  10/10/2018    w/biopsy performed by Peter Reid MD at [address]  08/21/2018    Dr. Tamar Garcia - x3 (LIMA-LAD, L SV-D1-PLV) modified BL MAZE procedure w/obliteration of WAYNE using 45mm AtriClip    HYSTERECTOMY      INSERTABLE CARDIAC MONITOR Left 08/16/2018    [id number] [employer]    MITRAL VALVE REPLACEMENT  08/21/2018    Dr. Tamar Garcia - 27mm Medtronic Cinch tissue valve    SKIN BIOPSY      TRANSESOPHAGEAL ECHOCARDIOGRAM  08/21/2018    during CABG/MVR    TUNNELED VENOUS CATHETER PLACEMENT Left 08/23/2018    Dr. Andres Srinivasan -  for HD---since removed    UPPER GASTROINTESTINAL ENDOSCOPY N/A 10/10/2018    w/biopsy performed by Peter Reid MD at [address] History:    Social History     Tobacco Use    Smoking status: Never Smoker    Smokeless tobacco: Never Used   Substance Use Topics    Alcohol use: No                                Counseling given: Not Answered      Vital Signs (Current): There were no vitals filed for this visit.                                            BP Readings from Last 3 Encounters:   08/07/20 118/84   06/19/20 102/66 05/22/20 108/70       NPO Status:                                                              >8 hours                   BMI:   Wt Readings from Last 3 Encounters:   07/17/20 198 lb (89.8 kg)   06/19/20 198 lb 6 oz (90 kg)   05/22/20 208 lb 4 oz (94.5 kg)     There is no height or weight on file to calculate BMI.    CBC:   Lab Results   Component Value Date    WBC 4.2 06/22/2020    RBC 3.94 06/22/2020    HGB 10.7 06/22/2020    HCT 34.2 06/22/2020    MCV 86.8 06/22/2020    RDW 15.7 06/22/2020     06/22/2020       CMP:   Lab Results   Component Value Date     05/18/2020    K 3.9 05/18/2020    K 4.3 05/09/2020     05/18/2020    CO2 26 05/18/2020    BUN 14 05/18/2020    CREATININE 1.2 05/18/2020    GFRAA 53 05/18/2020    AGRATIO 1.0 05/12/2020    LABGLOM 44 05/18/2020    LABGLOM 45 12/06/2018    GLUCOSE 103 05/18/2020    PROT 5.6 05/13/2020    CALCIUM 8.5 05/18/2020    BILITOT 0.5 05/13/2020    ALKPHOS 105 05/13/2020    AST 18 05/13/2020    ALT 19 05/13/2020       POC Tests: No results for input(s): POCGLU, POCNA, POCK, POCCL, POCBUN, POCHEMO, POCHCT in the last 72 hours. Coags:   Lab Results   Component Value Date    PROTIME 21.5 05/18/2020    INR 1.50 08/06/2020    APTT 39.1 05/14/2020       HCG (If Applicable): No results found for: PREGTESTUR, PREGSERUM, HCG, HCGQUANT     ABGs:   Lab Results   Component Value Date    PHART 7.512 11/06/2018    PO2ART 88.2 11/06/2018    ACY7FQA 34.5 11/06/2018    DUY2CWB 27.6 11/06/2018    BEART 4.5 11/06/2018    F0EEWIOK 98.0 11/06/2018         Conclusions      Summary   -Borderline global systolic function with an ejection fraction estimated at   45-50%.  -Apical akinesis noted.   -Left atrial enlargement noted based on volume index.   -The bioprosthetic mitral valve is well seated with peak velocity of 2.58m/s   and a mean gradient of 9 mmHg. The mitral valve area by pressure halftime is   estimated at 2.22 cm^2.  There is trivial mitral regurgitation.   -Mild aortic stenosis with a peak velocity of 2.39m/s and a mean pressure   gradient of 13 mmHg. The aortic valve area is estimated at 1.16 cm^2 by   continuity equation and 1.22 cm^2 by planimetry. There is trivial aortic   insufficiency.   -There is mild-to-moderate tricuspid regurgitation with a RVSP estimation of   47 mmHg.   -The IVC is dilated .   -Indeterminate diastolic function.      Signature      ------------------------------------------------------------------   Electronically signed by Colten Nieves MD, [facility] (Interpreting   physician) on 09/20/2019 at 04:50 PM   ------------------------------------------------------------------     Type & Screen (If Applicable):  No results found for: [facility]    Anesthesia Evaluation  Patient summary reviewed and Nursing notes reviewed  Airway: Mallampati: III  TM distance: >3 FB   Neck ROM: full  Mouth opening: > = 3 FB Dental:          Pulmonary:normal exam    (+) asthma:                            Cardiovascular:  Exercise tolerance: poor (<4 METS),   (+) hypertension:, valvular problems/murmurs: AS, CAD:, dysrhythmias: atrial fibrillation and SVT, CHF: systolic and diastolic, pulmonary hypertension: moderate, hyperlipidemia      ECG reviewed  Rhythm: irregular    Echocardiogram reviewed               ROS comment: EF 45%     Neuro/Psych:               GI/Hepatic/Renal:   (+) renal disease: CRI,           Endo/Other:    (+) Diabetes, blood dyscrasia: anemia and anticoagulation therapy:., .                 Abdominal:   (+) obese,         Vascular:   + PVD, aortic or cerebral, . Anesthesia Plan      MAC     ASA 4       Induction: intravenous. Anesthetic plan and risks discussed with patient. Plan discussed with CRNA. MEDICATIONS:  No current facility-administered medications for this encounter.          LABS:  Lab Results   Component Value Date    WBC 4.2 06/22/2020    HGB 10.7 (L)

## 2020-08-07 NOTE — PROGRESS NOTES
Pt arrived from endo to PACU bay 4. Report received from endo staff. Pt arouses easily to voice. Pt on 4 L NC, Afib, VSS. Will continue to monitor.

## 2020-08-07 NOTE — PROGRESS NOTES
Pt awake and alert. Pt on RA, VSS.  out in the waiting room. Pt denies pain and nausea, tolerating PO. Pt meets criteria to be discharged from phase 1.

## 2020-08-12 ENCOUNTER — NURSE TRIAGE (OUTPATIENT)
Dept: OTHER | Facility: CLINIC | Age: 75
End: 2020-08-12

## 2020-08-12 ENCOUNTER — TELEPHONE (OUTPATIENT)
Dept: FAMILY MEDICINE CLINIC | Age: 75
End: 2020-08-12

## 2020-08-12 NOTE — H&P
ENDOSCOPY N/A 10/10/2018    w/biopsy performed by Susie Paris MD at [address] [facility]     Medications Prior to Admission:   No current facility-administered medications on file prior to encounter. Current Outpatient Medications on File Prior to Encounter   Medication Sig Dispense Refill    carvedilol (COREG) 6.25 MG tablet TAKE 1 TABLET TWICE A DAY WITH MEALS (Patient taking differently: 2 times daily ) 180 tablet 0    albuterol (PROVENTIL) (2.5 MG/3ML) 0.083% nebulizer solution Take 3 mLs by nebulization every 6 hours as needed for Wheezing 120 each 3    methocarbamol (ROBAXIN-750) 750 MG tablet Take 1 tablet by mouth 2 times daily (Patient taking differently: Take 750 mg by mouth as needed ) 60 tablet 1    torsemide (DEMADEX) 20 MG tablet TAKE 1 TABLET DAILY 90 tablet 1    spironolactone (ALDACTONE) 25 MG tablet TAKE 1 TABLET DAILY 90 tablet 3    polyethylene glycol (GLYCOLAX) 17 GM/SCOOP powder Take 17 g by mouth daily      omeprazole (PRILOSEC) 20 MG delayed release capsule Take 20 mg by mouth daily      FreeStyle Lancets MISC 1 each by Does not apply route 4 times daily 200 each 5    ondansetron (ZOFRAN) 4 MG tablet Take 1 tablet by mouth 3 times daily as needed for Nausea or Vomiting 30 tablet 0    albuterol sulfate HFA (PROAIR HFA) 108 (90 Base) MCG/ACT inhaler Inhale 2 puffs into the lungs every 6 hours as needed for Wheezing 3 Inhaler 0    montelukast (SINGULAIR) 10 MG tablet TAKE 1 TABLET NIGHTLY 90 tablet 4    Blood Glucose Monitoring Suppl (AhonyaACE PRO GLUCOSE METER) GAETANO 1 Device by Does not apply route 4 times daily 1 Device 0    HUMALOG KWIKPEN 100 UNIT/ML SOPN TAKE AS INSTRUCTED BY YOUR PRESCRIBER (Patient taking differently:  Only if high blood sugar-has not been using) 15 mL 8    nystatin (MYCOSTATIN) 086585 UNIT/ML suspension TAKE ONE TEASPOONFUL BY MOUTH FOUR TIMES A DAY FOR 5 DAYS 1 Bottle 2    aspirin 81 MG chewable tablet Take 1 tablet by mouth daily 30 tablet 3    BD PEN NEEDLE JANNET U/F 32G X 4 MM MISC USE 1 PEN NEEDLE FOUR TIMES A  each 3    vitamin B-12 500 MCG tablet Take 1 tablet by mouth daily 30 tablet 3    Blood Glucose Monitoring Suppl (FREESTYLE LITE) GAETANO 1 Device by Does not apply route 4 times daily Patient to check blood sugar 4 times a day and PRN, she is treated with multiple daily injections of insulin that require correction dosing ;A1C 8.1 ; ICD code-E11.65 ,Z79.4 (Patient taking differently: 1 Device by Does not apply route 2 times daily ) 1 Device 0    insulin glargine (LANTUS SOLOSTAR) 100 UNIT/ML injection pen INJECT 50 UNITS IN THE MORNING AND 24 UNITS AT BEDTIME (Patient taking differently: every morning INJECT 35 UNITS) 90 mL 1    exenatide (BYETTA 10 MCG PEN) 10 MCG/0.04ML injection Inject 0.04 mLs into the skin daily (Patient taking differently: Inject 10 mcg into the skin 2 times daily (with meals) ) 3 pen 3    warfarin (COUMADIN) 5 MG tablet TAKE 1 TABLET DAILY 100 tablet 4        Allergies:  Jgiekvzi-ilzqnme-iuvcxm [fluocinolone]; Ciprofloxacin; Diovan [valsartan]; Flagyl [metronidazole]; Metformin and related [metformin and related]; Benazepril; Morphine; Saxagliptin; and Levaquin [levofloxacin]      Social History:   Social History     Tobacco Use    Smoking status: Never Smoker    Smokeless tobacco: Never Used   Substance Use Topics    Alcohol use: No     Family History:   Family History   Problem Relation Age of Onset    Cancer Father     Asthma Mother     Hypertension Mother     Heart Disease Mother     High Blood Pressure Mother        PHYSICAL EXAM:      /71   Pulse 86   Temp 97.3 °F (36.3 °C) (Temporal)   Resp 22   Ht 5' 8\" (1.727 m)   Wt 198 lb (89.8 kg)   SpO2 99%   BMI 30.11 kg/m²  I        Heart:  RRR, normal s1s2    Lungs:  CTA and normal effort    Abdomen:   Soft, nt nd. ASSESSMENT AND PLAN:    1. Patient is a 69 y.o. female here for endoscopy with MAC sedation.     2.  Procedure options, risks and

## 2020-08-12 NOTE — TELEPHONE ENCOUNTER
----- Message from Graham Gonsales sent at 8/12/2020  2:26 PM EDT -----  Subject: Appointment Request    Reason for Call: Semi-Routine Return from RN Triage    QUESTIONS  Type of Appointment? Established Patient  Reason for appointment request? No appointments available during search  Additional Information for Provider? Nurse triage return call patient   disposition is to be see =n in 2-3 days. Nurse suggest that she be seen in   the office vs VV.  ---------------------------------------------------------------------------  --------------  CALL BACK INFO  What is the best way for the office to contact you? OK to leave message on   voicemail  Preferred Call Back Phone Number? [phone]  ---------------------------------------------------------------------------  --------------  SCRIPT ANSWERS  Patient needs to be seen within 5 days? Yes  Nurse Name? Jonh Dao  (Did RN indicate the need for Red Scheduling?)? No  Have you been diagnosed with COVID-19 (Coronavirus)   tested for COVID-19 (Coronavirus)   or told that you are suspected of having COVID-19 (Coronavirus)? No  Have you had a fever or taken medication to treat a fever within the past   3 days? No  Have you had a cough   shortness of breath or flu-like symptoms within the past 3 days? No  Do you currently have flu-like symptoms including fever or chills   cough   shortness of breath   or difficulty breathing   or new loss of taste or smell? No  (Service Expert  click yes below to proceed with [employer] As Usual   Scheduling)?  Yes

## 2020-08-13 LAB — INR BLD: 2.2

## 2020-08-14 ENCOUNTER — TELEPHONE (OUTPATIENT)
Dept: FAMILY MEDICINE CLINIC | Age: 75
End: 2020-08-14

## 2020-08-14 ENCOUNTER — ANTI-COAG VISIT (OUTPATIENT)
Dept: FAMILY MEDICINE CLINIC | Age: 75
End: 2020-08-14

## 2020-08-14 ENCOUNTER — OFFICE VISIT (OUTPATIENT)
Dept: FAMILY MEDICINE CLINIC | Age: 75
End: 2020-08-14
Payer: MEDICARE

## 2020-08-14 VITALS
TEMPERATURE: 98 F | HEART RATE: 108 BPM | WEIGHT: 203 LBS | DIASTOLIC BLOOD PRESSURE: 70 MMHG | SYSTOLIC BLOOD PRESSURE: 110 MMHG | BODY MASS INDEX: 30.87 KG/M2 | OXYGEN SATURATION: 95 %

## 2020-08-14 PROCEDURE — 99213 OFFICE O/P EST LOW 20 MIN: CPT | Performed by: FAMILY MEDICINE

## 2020-08-14 RX ORDER — PREDNISONE 10 MG/1
TABLET ORAL
Qty: 15 TABLET | Refills: 0
Start: 2020-08-14 | End: 2020-09-23

## 2020-08-14 RX ORDER — NEOMYCIN SULFATE, POLYMYXIN B SULFATE AND HYDROCORTISONE 10; 3.5; 1 MG/ML; MG/ML; [USP'U]/ML
3 SUSPENSION/ DROPS AURICULAR (OTIC) 3 TIMES DAILY
Qty: 1 BOTTLE | Refills: 0 | Status: SHIPPED | OUTPATIENT
Start: 2020-08-14 | End: 2020-08-19

## 2020-08-14 NOTE — PROGRESS NOTES
Subjective:      Patient ID: Bruno Pate is a 69 y.o. female. CC: Patient presents for acute medical problem-severe left ear pain and abdominal fullness with nausea. Medical assistant notes reviewed. HPI Patient presents with right ear fullness since Monday. She states that she feels fluid in her ear also. Patient states she has also had issues with severe nausea. She has been treating with Ondansetron. Patient started having the left ear discomfort on Monday of this week. Became quite intense she states is very sore to touch her ear. She denies any hearing difficulty. She is also been having midepigastric abdominal pain with bloating and diminished appetite. She has obvious eosinophilic gastritis but did not give her self a prednisone burst.  Review of Systems     Allergies   Allergen Reactions    Qiazgjrr-Hybcekj-Vxzkls [Fluocinolone] Shortness Of Breath    Ciprofloxacin Shortness Of Breath    Diovan [Valsartan] Shortness Of Breath    Flagyl [Metronidazole] Shortness Of Breath     Has taken diflucan at home 12/7/15    Metformin And Related [Metformin And Related] Shortness Of Breath    Benazepril      Other reaction(s): Not Recorded    Morphine      Bad reaction. \"makes her feel horrible\".  Saxagliptin      Other reaction(s): Not Recorded    Levaquin [Levofloxacin] Rash       Objective:   Physical Exam  Vitals signs reviewed. Constitutional:       General: She is not in acute distress. Appearance: Normal appearance. She is well-developed. HENT:      Right Ear: Tympanic membrane and external ear normal.      Left Ear: Tympanic membrane normal.      Ears:      Comments: Left ear canal is slightly swollen and tender with range of motion. Nose: No congestion. Right Sinus: No maxillary sinus tenderness or frontal sinus tenderness. Left Sinus: No maxillary sinus tenderness or frontal sinus tenderness. Neck:      Musculoskeletal: Neck supple.    Pulmonary:      Effort:

## 2020-08-17 ENCOUNTER — NURSE ONLY (OUTPATIENT)
Dept: CARDIOLOGY CLINIC | Age: 75
End: 2020-08-17
Payer: MEDICARE

## 2020-08-17 PROCEDURE — G2066 INTER DEVC REMOTE 30D: HCPCS | Performed by: INTERNAL MEDICINE

## 2020-08-17 PROCEDURE — 93298 REM INTERROG DEV EVAL SCRMS: CPT | Performed by: INTERNAL MEDICINE

## 2020-08-17 NOTE — PROGRESS NOTES
We received a remote transmission form patient's monitor at home. Remote Linq report shows known AF. Pt is on coumadin. EP physician to review. We will continue to monitor remotely.

## 2020-08-17 NOTE — LETTER
[address] [facility] [phone]  [address] [facility],[address] [phone]    Pacemaker/Defibrillator Clinic          08/17/20        Aline Freeman  [address] [address]        Dear Aline Freeman    This letter is to inform you that we received the transmission from your monitor at home that checks your implanted heart device. The next date your monitor will automatically transmit will be 9-21-20. If your report needs attention we will notify you. Your device and monitor are wireless and most transmit cellularly, but please periodically check your monitor is still plugged in to the electrical outlet. If you still use the telephone land line to send please ensure the connection to the phone abel is secure. This will help to ensure successful automatic transmissions in the future. Also, the monitor needs to be close to you while sleeping at night. Please be aware that the remote device transmission sites are periodically monitored only during regular business hours during which simultaneous in-office device clinics are being run. If your transmission requires attention, we will contact you as soon as possible. Thank you.             Amisael 82

## 2020-08-20 ENCOUNTER — ANTI-COAG VISIT (OUTPATIENT)
Dept: FAMILY MEDICINE CLINIC | Age: 75
End: 2020-08-20

## 2020-08-20 LAB — INR BLD: 3.4

## 2020-08-27 LAB — INR BLD: 1.9

## 2020-08-28 ENCOUNTER — ANTI-COAG VISIT (OUTPATIENT)
Dept: FAMILY MEDICINE CLINIC | Age: 75
End: 2020-08-28

## 2020-08-31 ENCOUNTER — NURSE TRIAGE (OUTPATIENT)
Dept: OTHER | Facility: CLINIC | Age: 75
End: 2020-08-31

## 2020-08-31 NOTE — TELEPHONE ENCOUNTER
Reason for Disposition   Patient wants to be seen    Answer Assessment - Initial Assessment Questions  1. ONSET: \"When did the swelling start? \" (e.g., minutes, hours, days)     Approx 1.5 weeks  2. LOCATION: \"What part of the leg is swollen? \"  \"Are both legs swollen or just one leg? \"      Bilateral feet  3. SEVERITY: \"How bad is the swelling? \" (e.g., localized; mild, moderate, severe)   - Localized - small area of swelling localized to one leg   - MILD pedal edema - swelling limited to foot and ankle, pitting edema < 1/4 inch (6 mm) deep, rest and elevation eliminate most or all swelling   - MODERATE edema - swelling of lower leg to knee, pitting edema > 1/4 inch (6 mm) deep, rest and elevation only partially reduce swelling   - SEVERE edema - swelling extends above knee, facial or hand swelling present       Mild-Mod  4. REDNESS: \"Does the swelling look red or infected? \"      No redness  5. PAIN: \"Is the swelling painful to touch? \" If so, ask: \"How painful is it? \"   (Scale 1-10; mild, moderate or severe)      No pain - just tightness  6. FEVER: \"Do you have a fever? \" If so, ask: \"What is it, how was it measured, and when did it start? \"       Deneis   7. CAUSE: \"What do you think is causing the leg swelling? \"      Unknown  8. MEDICAL HISTORY: \"Do you have a history of heart failure, kidney disease, liver failure, or cancer? \"      AONMRE   0. RECURRENT SYMPTOM: \"Have you had leg swelling before? \" If so, ask: \"When was the last time? \" \"What happened that time? \"      Daily for last 1.5 weeks  10. OTHER SYMPTOMS: \"Do you have any other symptoms? \" (e.g., chest pain, difficulty breathing)        Denies  11. PREGNANCY: \"Is there any chance you are pregnant? \" \"When was your last menstrual period? \"        N/A    Protocols used: LEG SWELLING AND EDEMA-ADULT-[place]        Pt reports new bilateral edema in bilateral feet. States worse after she has been awake and up. States pitting edema.  Reviewed disposition to be seen at PCP today. Warm transfer to [facility] provided care advice and instructed to call back with worsening symptoms. Please do not respond to the triage nurse through this encounter. Any subsequent communication should be directly with the patient.

## 2020-09-02 ENCOUNTER — OFFICE VISIT (OUTPATIENT)
Dept: FAMILY MEDICINE CLINIC | Age: 75
End: 2020-09-02
Payer: MEDICARE

## 2020-09-02 VITALS
BODY MASS INDEX: 31.85 KG/M2 | TEMPERATURE: 97.5 F | WEIGHT: 209.5 LBS | DIASTOLIC BLOOD PRESSURE: 80 MMHG | RESPIRATION RATE: 16 BRPM | OXYGEN SATURATION: 97 % | SYSTOLIC BLOOD PRESSURE: 110 MMHG | HEART RATE: 102 BPM

## 2020-09-02 PROCEDURE — 99214 OFFICE O/P EST MOD 30 MIN: CPT | Performed by: FAMILY MEDICINE

## 2020-09-02 NOTE — PROGRESS NOTES
Subjective:      Patient ID: Janes Clark is a 69 y.o. female. CC: Patient presents for acute medical problem-increasing swelling, dyspnea on exertion, persistent abdominal discomfort and known atrial fibrillation. . Medical assistant notes reviewed. HPI Pt is here to discuss bilateral feet, leg swelling, stomach issues, ongoing for 3 weeks now. Pt has been taking torsemide twice a day but it doesn't seem to help. Patient did go ahead and treat with prednisone medication for eosinophilic gastritis but she did not feel this made much improvement in her symptoms. Her blood sugars became more elevated with prednisone. Overall her back being in pretty well controlled. She has had increasing swelling and weight changes because of this. She did increase her diuretic as discussed in the past and unfortunately did not make any improvement. She is also noted with minimal activity she becomes short of breath. She still having increasing abdominal swelling in the upper abdomen. Her appetite is diminished and her bowels are still working normal for her.     Review of Systems  Current Outpatient Medications on File Prior to Visit   Medication Sig Dispense Refill    predniSONE (DELTASONE) 10 MG tablet 1 TID for 3 day then 1 BID 15 tablet 0    rosuvastatin (CRESTOR) 20 MG tablet TAKE 1 TABLET DAILY 90 tablet 0    potassium chloride (KLOR-CON M) 10 MEQ extended release tablet TAKE 1 TABLET DAILY 90 tablet 0    blood glucose test strips (FREESTYLE LITE) strip USE TO TEST FOUR TIMES A  strip 4    carvedilol (COREG) 6.25 MG tablet TAKE 1 TABLET TWICE A DAY WITH MEALS (Patient taking differently: 2 times daily ) 180 tablet 0    insulin glargine (LANTUS SOLOSTAR) 100 UNIT/ML injection pen INJECT 50 UNITS IN THE MORNING AND 24 UNITS AT BEDTIME (Patient taking differently: every morning INJECT 35 UNITS) 90 mL 1    albuterol (PROVENTIL) (2.5 MG/3ML) 0.083% nebulizer solution Take 3 mLs by nebulization every 6 hours as needed for Wheezing 120 each 3    methocarbamol (ROBAXIN-750) 750 MG tablet Take 1 tablet by mouth 2 times daily (Patient taking differently: Take 750 mg by mouth as needed ) 60 tablet 1    exenatide (BYETTA 10 MCG PEN) 10 MCG/0.04ML injection Inject 0.04 mLs into the skin daily (Patient taking differently: Inject 10 mcg into the skin 2 times daily (with meals) ) 3 pen 3    torsemide (DEMADEX) 20 MG tablet TAKE 1 TABLET DAILY 90 tablet 1    spironolactone (ALDACTONE) 25 MG tablet TAKE 1 TABLET DAILY 90 tablet 3    polyethylene glycol (GLYCOLAX) 17 GM/SCOOP powder Take 17 g by mouth daily      omeprazole (PRILOSEC) 20 MG delayed release capsule Take 20 mg by mouth daily      FreeStyle Lancets MISC 1 each by Does not apply route 4 times daily 200 each 5    ondansetron (ZOFRAN) 4 MG tablet Take 1 tablet by mouth 3 times daily as needed for Nausea or Vomiting 30 tablet 0    albuterol sulfate HFA (PROAIR HFA) 108 (90 Base) MCG/ACT inhaler Inhale 2 puffs into the lungs every 6 hours as needed for Wheezing 3 Inhaler 0    warfarin (COUMADIN) 5 MG tablet TAKE 1 TABLET DAILY 100 tablet 4    montelukast (SINGULAIR) 10 MG tablet TAKE 1 TABLET NIGHTLY 90 tablet 4    Blood Glucose Monitoring Suppl (EMBRACE PRO GLUCOSE METER) GAETANO 1 Device by Does not apply route 4 times daily 1 Device 0    HUMALOG KWIKPEN 100 UNIT/ML SOPN TAKE AS INSTRUCTED BY YOUR PRESCRIBER (Patient taking differently:  Only if high blood sugar-has not been using) 15 mL 8    nystatin (MYCOSTATIN) 553547 UNIT/ML suspension TAKE ONE TEASPOONFUL BY MOUTH FOUR TIMES A DAY FOR 5 DAYS 1 Bottle 2    aspirin 81 MG chewable tablet Take 1 tablet by mouth daily 30 tablet 3    BD PEN NEEDLE JANNET U/F 32G X 4 MM MISC USE 1 PEN NEEDLE FOUR TIMES A  each 3    vitamin B-12 500 MCG tablet Take 1 tablet by mouth daily 30 tablet 3    Blood Glucose Monitoring Suppl (FREESTYLE LITE) GAETANO 1 Device by Does not apply route 4 times daily Patient to check blood sugar 4 times a day and PRN, she is treated with multiple daily injections of insulin that require correction dosing ;A1C 8.1 ; ICD code-E11.65 ,Z79.4 (Patient taking differently: 1 Device by Does not apply route 2 times daily ) 1 Device 0     No current facility-administered medications on file prior to visit. Objective:   Physical Exam  Constitutional:       General: She is not in acute distress. Appearance: She is well-developed. Neck:      Vascular: No carotid bruit. Cardiovascular:      Rate and Rhythm: Tachycardia present. Rhythm regularly irregular. Pulses:           Dorsalis pedis pulses are 2+ on the right side and 2+ on the left side. Posterior tibial pulses are 2+ on the right side and 2+ on the left side. Heart sounds: Murmur present. Systolic (Right sternal border) murmur present with a grade of 2/6. No friction rub. No gallop. Pulmonary:      Effort: Pulmonary effort is normal.      Breath sounds: Normal breath sounds. Abdominal:      General: Bowel sounds are normal. There is distension. Palpations: Abdomen is soft. Tenderness: There is abdominal tenderness in the epigastric area and periumbilical area. There is no right CVA tenderness, left CVA tenderness or guarding. Hernia: No hernia is present. Musculoskeletal: Normal range of motion. General: No tenderness. Right lower leg: Edema (4 plus edema bilaterally starting from the mid calf to big toe) present. Left lower leg: Edema present. Neurological:      Mental Status: She is alert. Sensory: Sensation is intact. Motor: Motor function is intact. Psychiatric:         Behavior: Behavior is cooperative. Assessment:      Katherine Reilly was seen today for edema. Diagnoses and all orders for this visit:    Acute on chronic combined systolic and diastolic congestive heart failure ([address]  -     CBC WITH AUTO DIFFERENTIAL;  Future  -     Comprehensive Metabolic Panel; Future  -     BRAIN NATRIURETIC PEPTIDE (BNP); Future    Longstanding persistent atrial fibrillation    Coronary artery disease of native artery of native heart with stable angina pectoris (HCC)    Ischemic cardiomyopathy    Secondary hypertension    Eosinophilic gastritis  -     CBC WITH AUTO DIFFERENTIAL; Future    Type 2 diabetes mellitus with hyperglycemia, with long-term current use of insulin (HCC)  -     Comprehensive Metabolic Panel; Future  -     Hemoglobin A1C; Future            Plan:      Patient was advised to maintain a low-salt diet and continue with diuretic twice daily. Proceed with laboratory testing and discussed the possible need for chest x-ray and echocardiogram.    If symptoms worsen, it was discussed that she would should go to the emergency room    Please note that this chart was generated using Dragon dictation software. Although every effort was made to ensure the accuracy of this automated transcription, some errors in transcription may have occurred.

## 2020-09-03 LAB
A/G RATIO: 1.3 (CALC) (ref 1–2.5)
ALBUMIN SERPL-MCNC: 2.8 G/DL (ref 3.6–5.1)
ALP BLD-CCNC: 111 U/L (ref 37–153)
ALT SERPL-CCNC: 15 U/L (ref 6–29)
AST SERPL-CCNC: 16 U/L (ref 10–35)
B-TYPE NATRIURETIC PEPTIDE: 513 PG/ML
BASOPHILS ABSOLUTE: 21 CELLS/UL (ref 0–200)
BASOPHILS RELATIVE PERCENT: 0.5 %
BILIRUB SERPL-MCNC: 0.6 MG/DL (ref 0.2–1.2)
BUN / CREAT RATIO: 21 (CALC) (ref 6–22)
BUN BLDV-MCNC: 22 MG/DL (ref 7–25)
CALCIUM SERPL-MCNC: 8.3 MG/DL (ref 8.6–10.4)
CHLORIDE BLD-SCNC: 105 MMOL/L (ref 98–110)
CO2: 27 MMOL/L (ref 20–32)
CREAT SERPL-MCNC: 1.06 MG/DL (ref 0.6–0.93)
EOSINOPHILS ABSOLUTE: 217 CELLS/UL (ref 15–500)
EOSINOPHILS RELATIVE PERCENT: 5.3 %
GFR AFRICAN AMERICAN: 60 ML/MIN/1.73M2
GFR, ESTIMATED: 52 ML/MIN/1.73M2
GLOBULIN: 2.2 G/DL (CALC) (ref 1.9–3.7)
GLUCOSE BLD-MCNC: 111 MG/DL (ref 65–99)
HBA1C MFR BLD: 6.7 % OF TOTAL HGB
HCT VFR BLD CALC: 33.8 % (ref 35–45)
HEMOGLOBIN: 10.7 G/DL (ref 11.7–15.5)
LYMPHOCYTES ABSOLUTE: 816 CELLS/UL (ref 850–3900)
LYMPHOCYTES RELATIVE PERCENT: 19.9 %
MCH RBC QN AUTO: 27 PG (ref 27–33)
MCHC RBC AUTO-ENTMCNC: 31.7 G/DL (ref 32–36)
MCV RBC AUTO: 85.4 FL (ref 80–100)
MONOCYTES ABSOLUTE: 418 CELLS/UL (ref 200–950)
MONOCYTES RELATIVE PERCENT: 10.2 %
NEUTROPHILS ABSOLUTE: 2628 CELLS/UL (ref 1500–7800)
PDW BLD-RTO: 15.4 % (ref 11–15)
PLATELET # BLD: 194 THOUSAND/UL (ref 140–400)
PMV BLD AUTO: 11.3 FL (ref 7.5–12.5)
POTASSIUM SERPL-SCNC: 3.8 MMOL/L (ref 3.5–5.3)
RBC # BLD: 3.96 MILLION/UL (ref 3.8–5.1)
SEGMENTED NEUTROPHILS RELATIVE PERCENT: 64.1 %
SODIUM BLD-SCNC: 140 MMOL/L (ref 135–146)
TOTAL PROTEIN: 5 G/DL (ref 6.1–8.1)
WBC # BLD: 4.1 THOUSAND/UL (ref 3.8–10.8)

## 2020-09-04 ENCOUNTER — TELEPHONE (OUTPATIENT)
Dept: FAMILY MEDICINE CLINIC | Age: 75
End: 2020-09-04

## 2020-09-10 ENCOUNTER — TELEPHONE (OUTPATIENT)
Dept: FAMILY MEDICINE CLINIC | Age: 75
End: 2020-09-10

## 2020-09-15 ENCOUNTER — HOSPITAL ENCOUNTER (OUTPATIENT)
Dept: GENERAL RADIOLOGY | Age: 75
Discharge: HOME | End: 2020-09-15
Payer: MEDICARE

## 2020-09-15 ENCOUNTER — HOSPITAL ENCOUNTER (OUTPATIENT)
Age: 75
Discharge: HOME | End: 2020-09-15
Payer: MEDICARE

## 2020-09-15 ENCOUNTER — HOSPITAL ENCOUNTER (OUTPATIENT)
Dept: NON INVASIVE DIAGNOSTICS | Age: 75
Discharge: HOME | End: 2020-09-15
Payer: MEDICARE

## 2020-09-15 LAB
LV EF: 28 %
LVEF MODALITY: NORMAL

## 2020-09-15 PROCEDURE — 93306 TTE W/DOPPLER COMPLETE: CPT

## 2020-09-15 PROCEDURE — 71046 X-RAY EXAM CHEST 2 VIEWS: CPT

## 2020-09-16 ENCOUNTER — TELEPHONE (OUTPATIENT)
Dept: FAMILY MEDICINE CLINIC | Age: 75
End: 2020-09-16

## 2020-09-16 RX ORDER — TORSEMIDE 100 MG/1
100 TABLET ORAL DAILY
Qty: 30 TABLET | Refills: 0 | Status: SHIPPED | OUTPATIENT
Start: 2020-09-16 | End: 2020-09-23 | Stop reason: SDUPTHER

## 2020-09-16 NOTE — TELEPHONE ENCOUNTER
----- Message from Preeti Mayes MD sent at 9/16/2020  7:36 AM EDT -----  Echocardiogram demonstrates decreased left ventricular function consistent with congestive heart failure. A prescription for Demadex 100 mg daily was sent to the pharmacy. Would recommend that patient weigh herself every day and RTC in the next 2 weeks.   Also referral to Dr. Myra Gonzalez cardiologist that specializes in congestive heart failure

## 2020-09-21 ENCOUNTER — NURSE ONLY (OUTPATIENT)
Dept: CARDIOLOGY CLINIC | Age: 75
End: 2020-09-21
Payer: MEDICARE

## 2020-09-21 PROCEDURE — G2066 INTER DEVC REMOTE 30D: HCPCS | Performed by: INTERNAL MEDICINE

## 2020-09-21 PROCEDURE — 93298 REM INTERROG DEV EVAL SCRMS: CPT | Performed by: INTERNAL MEDICINE

## 2020-09-21 NOTE — PROGRESS NOTES
We received a remote transmission form patient's monitor at home. Remote Linq report shows known AF. Pt remains on coumadin. EP physician to review. We will continue to monitor remotely.

## 2020-09-23 ENCOUNTER — OFFICE VISIT (OUTPATIENT)
Dept: FAMILY MEDICINE CLINIC | Age: 75
End: 2020-09-23
Payer: MEDICARE

## 2020-09-23 VITALS
HEART RATE: 105 BPM | DIASTOLIC BLOOD PRESSURE: 70 MMHG | RESPIRATION RATE: 12 BRPM | BODY MASS INDEX: 30.14 KG/M2 | OXYGEN SATURATION: 98 % | TEMPERATURE: 96.9 F | WEIGHT: 198.25 LBS | SYSTOLIC BLOOD PRESSURE: 106 MMHG

## 2020-09-23 PROBLEM — R00.0 TACHYCARDIA: Status: ACTIVE | Noted: 2020-09-23

## 2020-09-23 PROBLEM — Z95.2 H/O MITRAL VALVE REPLACEMENT: Status: RESOLVED | Noted: 2018-08-22 | Resolved: 2020-09-23

## 2020-09-23 PROBLEM — K63.89 PNEUMATOSIS COLI: Status: RESOLVED | Noted: 2020-05-10 | Resolved: 2020-09-23

## 2020-09-23 PROCEDURE — 99214 OFFICE O/P EST MOD 30 MIN: CPT | Performed by: FAMILY MEDICINE

## 2020-09-23 PROCEDURE — 90686 IIV4 VACC NO PRSV 0.5 ML IM: CPT | Performed by: FAMILY MEDICINE

## 2020-09-23 PROCEDURE — G0008 ADMIN INFLUENZA VIRUS VAC: HCPCS | Performed by: FAMILY MEDICINE

## 2020-09-23 RX ORDER — POTASSIUM CHLORIDE 750 MG/1
TABLET, EXTENDED RELEASE ORAL
Qty: 90 TABLET | Refills: 1 | Status: ON HOLD | OUTPATIENT
Start: 2020-09-23 | End: 2021-04-21

## 2020-09-23 RX ORDER — CARVEDILOL 12.5 MG/1
12.5 TABLET ORAL 2 TIMES DAILY
Qty: 180 TABLET | Refills: 1 | Status: SHIPPED | OUTPATIENT
Start: 2020-09-23 | End: 2020-11-12

## 2020-09-23 RX ORDER — ROSUVASTATIN CALCIUM 20 MG/1
TABLET, COATED ORAL
Qty: 90 TABLET | Refills: 1 | Status: SHIPPED | OUTPATIENT
Start: 2020-09-23 | End: 2020-10-23

## 2020-09-23 RX ORDER — TORSEMIDE 100 MG/1
100 TABLET ORAL DAILY
Qty: 90 TABLET | Refills: 1 | Status: SHIPPED | OUTPATIENT
Start: 2020-09-23 | End: 2021-02-17

## 2020-09-23 NOTE — PROGRESS NOTES
Vaccine Information Sheet, \"Influenza - Inactivated\"  given to Dannie Calabrese, or parent/legal guardian of  Dannie Calabrese and verbalized understanding. Patient responses:    Have you ever had a reaction to a flu vaccine? No  Are you able to eat eggs without adverse effects? Yes  Do you have any current illness? No  Have you ever had Guillian Lawrence Syndrome? No    Flu vaccine given per order. Please see immunization tab.     Immunization(s) given during visit:     Immunizations Administered     Name Date Dose Route    Influenza, Quadv, 6-35 months, IM, PF (Fluzone, Afluria) 9/23/2020 0.25 mL Intramuscular    Site: Deltoid- Right    Lot: [id number]    NDC: 24894-979-05

## 2020-09-23 NOTE — LETTER
[facility] [address]  Phone: [phone]  Fax: [phone]    Garcia Wright MD        September 23, 2020    Norma Madeline  [address] [place] [address]  [place] Cuellar [id number]      Dear sir:    I am writing as a family physician of Norma Madeline in regards to jury duty excuse. Patient has significant medical related health issues and should be excused from jury duty    If you have any questions or concerns, please don't hesitate to call.     Sincerely,        Garcia Wright MD

## 2020-09-23 NOTE — PROGRESS NOTES
Subjective:      Patient ID: Sharda Marmolejo is a 69 y.o. female. CC: Patient resents for evaluation of acute congestive heart failure. HPI Pt is here for a follow up, med refill, test results. On patient's evaluation she was found to have an ejection fraction on echocardiogram of 25% which is significantly changed from 50% on prior echocardiograms. She has lost over 10 pounds with using Demadex at 100 mg but she still feels very short of breath with activities. She is status post mitral valve repair but on the echocardiogram this appears to be doing well. She has known atrial fibrillation. Patient is also concerned her diabetes mellitus at lunchtime seems to have elevated blood sugars on occasion. Eye exam current (within one year): Yes    Checks sugars at home: yes  Home blood sugar records: patient tests 4 time(s) per day  Any episodes of hypoglycemia?  No    Current medication use: taking as prescribed  Medication side effects: none     Current diet: in general, a \"healthy\" diet    Current exercise:not active    Review of Systems  Patient Active Problem List   Diagnosis    Long term current use of anticoagulant    Hyperlipidemia    Essential hypertension    Generalized osteoarthrosis, involving multiple sites    Eosinophilic gastritis    Paroxysmal SVT (supraventricular tachycardia) (HCC)    B12 deficiency    History of pulmonary embolism    Multiple pulmonary nodules    Type 2 diabetes mellitus with hyperglycemia, with long-term current use of insulin ([facility] [address]    Asthma    Hypertension    Cardiac arrhythmia    Encounter for loop recorder check    Coronary artery disease involving native coronary artery of native heart without angina pectoris    Goiter    Primary osteoarthritis of both knees    Splenic infarct    Obesity, Class I, BMI 30-34.9    Chronic combined systolic (congestive) and diastolic (congestive) heart failure (HCC)    Thoracic degenerative disc disease    Persistent atrial fibrillation    S/P MVR (mitral valve repair)    Ischemic cardiomyopathy    Occlusion and stenosis of bilateral carotid arteries    Pneumatosis intestinalis       Outpatient Medications Marked as Taking for the 9/23/20 encounter (Office Visit) with Luisa Roman MD   Medication Sig Dispense Refill    torsemide (DEMADEX) 100 MG tablet Take 1 tablet by mouth daily TAKE 1 TABLET DAILY 30 tablet 0    rosuvastatin (CRESTOR) 20 MG tablet TAKE 1 TABLET DAILY 90 tablet 0    potassium chloride (KLOR-CON M) 10 MEQ extended release tablet TAKE 1 TABLET DAILY 90 tablet 0    blood glucose test strips (FREESTYLE LITE) strip USE TO TEST FOUR TIMES A  strip 4    carvedilol (COREG) 6.25 MG tablet TAKE 1 TABLET TWICE A DAY WITH MEALS (Patient taking differently: 2 times daily ) 180 tablet 0    insulin glargine (LANTUS SOLOSTAR) 100 UNIT/ML injection pen INJECT 50 UNITS IN THE MORNING AND 24 UNITS AT BEDTIME (Patient taking differently: every morning INJECT 35 UNITS) 90 mL 1    albuterol (PROVENTIL) (2.5 MG/3ML) 0.083% nebulizer solution Take 3 mLs by nebulization every 6 hours as needed for Wheezing 120 each 3    methocarbamol (ROBAXIN-750) 750 MG tablet Take 1 tablet by mouth 2 times daily (Patient taking differently: Take 750 mg by mouth as needed ) 60 tablet 1    exenatide (BYETTA 10 MCG PEN) 10 MCG/0.04ML injection Inject 0.04 mLs into the skin daily (Patient taking differently: Inject 10 mcg into the skin 2 times daily (with meals) ) 3 pen 3    spironolactone (ALDACTONE) 25 MG tablet TAKE 1 TABLET DAILY 90 tablet 3    polyethylene glycol (GLYCOLAX) 17 GM/SCOOP powder Take 17 g by mouth daily      omeprazole (PRILOSEC) 20 MG delayed release capsule Take 20 mg by mouth daily      FreeStyle Lancets MISC 1 each by Does not apply route 4 times daily 200 each 5    ondansetron (ZOFRAN) 4 MG tablet Take 1 tablet by mouth 3 times daily as needed for Nausea or Vomiting 30 tablet 0    albuterol sulfate Alcohol use: No       /70 (Site: Left Upper Arm, Position: Sitting, Cuff Size: Large Adult)   Pulse 105   Temp 96.9 °F (36.1 °C) (Infrared)   Resp 12   Wt 198 lb 4 oz (89.9 kg)   SpO2 98%   BMI 30.14 kg/m²     Objective:   Physical Exam  Constitutional:       General: She is not in acute distress. Appearance: She is well-developed. Neck:      Vascular: No carotid bruit. Cardiovascular:      Rate and Rhythm: Tachycardia present. Rhythm regularly irregular. Pulses:           Dorsalis pedis pulses are 2+ on the right side and 2+ on the left side. Posterior tibial pulses are 2+ on the right side and 2+ on the left side. Heart sounds: Murmur present. Systolic (Right sternal border) murmur present with a grade of 2/6. No friction rub. No gallop. Pulmonary:      Effort: Pulmonary effort is normal.      Breath sounds: Normal breath sounds. Abdominal:      General: Bowel sounds are normal. There is distension. Palpations: Abdomen is soft. Tenderness: There is abdominal tenderness in the epigastric area and periumbilical area. There is no right CVA tenderness, left CVA tenderness or guarding. Hernia: No hernia is present. Musculoskeletal: Normal range of motion. General: No tenderness. Right lower leg: No edema. Left lower leg: No edema. Neurological:      General: No focal deficit present. Mental Status: She is alert. Sensory: Sensation is intact. Motor: Motor function is intact. Psychiatric:         Behavior: Behavior is cooperative. Assessment:      Marva Lazaro was seen today for follow-up and diabetes. Diagnoses and all orders for this visit:    Acute on chronic combined systolic and diastolic congestive heart failure (HCC)  -     TSH without Reflex; Future  -     BASIC METABOLIC PANEL;  Future    Need for influenza vaccination  -     INFLUENZA, QUADV,6-35 MO, IM, PF, PREFILL SYR, 0.25ML (AFLURIA QUADV, PF)    Type 2 diabetes mellitus with hyperglycemia, with long-term current use of insulin ([facility])  -     Diabetic Foot Exam    S/P MVR (mitral valve repair)    Persistent atrial fibrillation    Tachycardia    Other orders  -     potassium chloride (KLOR-CON M) 10 MEQ extended release tablet; TAKE 1 TABLET DAILY  -     torsemide (DEMADEX) 100 MG tablet; Take 1 tablet by mouth daily TAKE 1 TABLET DAILY  -     rosuvastatin (CRESTOR) 20 MG tablet; TAKE 1 TABLET DAILY  -     carvedilol (COREG) 12.5 MG tablet; Take 1 tablet by mouth 2 times daily TAKE 1 TABLET TWICE A DAY WITH MEALS            Plan:      Laboratory profile and echocardiogram and chest x-ray reviewed with patient and       Concern obviously is that her heart function has significantly deteriorated. Also with persistent tachycardia. Patient was advised to maintain a low-salt diet and avoid anti-inflammatory medications. She should continue with anticoagulation and Demadex at 100 mg daily. Coreg was increased to 12.5 mg twice daily. We did discuss starting on ACE blocker as well. Patient has appoint with cardiology next week for reevaluation. Clinically she obviously has improved with the significant weight loss and no edema. In regards to her diabetic management her hemoglobin A1c is under 7 and typically her sugars are less than 140. Maintain current treatment plan. RTC 1 month    Please note that this chart was generated using Dragon dictation software. Although every effort was made to ensure the accuracy of this automated transcription, some errors in transcription may have occurred.

## 2020-09-24 LAB — INR BLD: 1.8

## 2020-09-25 ENCOUNTER — TELEPHONE (OUTPATIENT)
Dept: FAMILY MEDICINE CLINIC | Age: 75
End: 2020-09-25

## 2020-09-25 ENCOUNTER — ANTI-COAG VISIT (OUTPATIENT)
Dept: FAMILY MEDICINE CLINIC | Age: 75
End: 2020-09-25

## 2020-09-28 ENCOUNTER — OFFICE VISIT (OUTPATIENT)
Dept: CARDIOLOGY CLINIC | Age: 75
End: 2020-09-28
Payer: MEDICARE

## 2020-09-28 VITALS
HEART RATE: 74 BPM | HEIGHT: 68 IN | SYSTOLIC BLOOD PRESSURE: 102 MMHG | WEIGHT: 202.6 LBS | DIASTOLIC BLOOD PRESSURE: 62 MMHG | BODY MASS INDEX: 30.71 KG/M2 | TEMPERATURE: 98.2 F | OXYGEN SATURATION: 97 %

## 2020-09-28 PROCEDURE — 99213 OFFICE O/P EST LOW 20 MIN: CPT | Performed by: INTERNAL MEDICINE

## 2020-09-28 PROCEDURE — 93000 ELECTROCARDIOGRAM COMPLETE: CPT | Performed by: INTERNAL MEDICINE

## 2020-09-28 NOTE — PATIENT INSTRUCTIONS
Support hose bilaterally  48 ounce fluid restriction  Stop crestor for one month to see if this alleviates the weakness in legs  Resting muga to evaluate EF  Follow up with Dr Kobi Marion for possible cardioversion

## 2020-09-28 NOTE — PROGRESS NOTES
[facility]   Cardiac Followup    Referring Provider:  Karolyn Sandra MD     Chief Complaint   Patient presents with    Follow-up        History of Present Illness:   Ms Paulette Mahajan is a 69 y.o. female seen as a follow up for management of CAD (18--CABG with MAZE and WAYNE atriaclip) alutter ablation (10/30/19) htn, hchol DVT/PE. She was cardioverted 1/3/2020. In August she had an echo that showed EF 25-30%. This was reviewed with very poor endocardial definition. Today, she states she has unchanged weakness. \"I feel like my legs will give out\"  This is not new, but she travels in wheelchairs for long distance. She had one episode of swelling in her legs , that has since gotten some better with elevation. She has unchanged exertional sob alleviated with rest. She has no chest pain, dizziness or syncope. She weighs herself daily  194-200 lbs    Patient is compliant  with medication and is tolerating them well without side effects. Patient has been advised on the importance of regular exercise of at least 20-30 minutes daily alternating with aerobic and isometric activities. Past Medical History:   has a past medical history of Asthma, Atrial fibrillation ([address] Eosinophilia, Hemoptysis, HIGH CHOLESTEROL, Hx of blood clots, Hypertension, Irregular heart beat, Other specified gastritis without mention of hemorrhage, Palpitations, Skin cancer, and Type II or unspecified type diabetes mellitus without mention of complication, not stated as uncontrolled. Surgical History:   has a past surgical history that includes Cholecystectomy;  section; Colonoscopy (2017); skin biopsy; bronchoscopy (2016); Coronary artery bypass graft (2018); Mitral valve replacement (2018); transesophageal echocardiogram (2018); Tunneled venous catheter placement (Left, 2018); Cardiac catheterization (2018); Insertable Cardiac Monitor (Left, 2018);  Colonoscopy (01/17/2014); Hysterectomy; Upper gastrointestinal endoscopy (N/A, 10/10/2018); Colonoscopy (10/10/2018); and Colonoscopy (N/A, 8/7/2020). Social History:   reports that she has never smoked. She has never used smokeless tobacco. She reports that she does not drink alcohol or use drugs. Family History:  family history includes Asthma in her mother; Cancer in her father; Heart Disease in her mother; High Blood Pressure in her mother; Hypertension in her mother.      Home Medications:  Current Outpatient Medications   Medication Sig Dispense Refill    potassium chloride (KLOR-CON M) 10 MEQ extended release tablet TAKE 1 TABLET DAILY 90 tablet 1    torsemide (DEMADEX) 100 MG tablet Take 1 tablet by mouth daily TAKE 1 TABLET DAILY 90 tablet 1    rosuvastatin (CRESTOR) 20 MG tablet TAKE 1 TABLET DAILY 90 tablet 1    carvedilol (COREG) 12.5 MG tablet Take 1 tablet by mouth 2 times daily TAKE 1 TABLET TWICE A DAY WITH MEALS 180 tablet 1    blood glucose test strips (FREESTYLE LITE) strip USE TO TEST FOUR TIMES A  strip 4    insulin glargine (LANTUS SOLOSTAR) 100 UNIT/ML injection pen INJECT 50 UNITS IN THE MORNING AND 24 UNITS AT BEDTIME (Patient taking differently: every morning 30 units in the morning and PRN at night if needed) 90 mL 1    albuterol (PROVENTIL) (2.5 MG/3ML) 0.083% nebulizer solution Take 3 mLs by nebulization every 6 hours as needed for Wheezing 120 each 3    methocarbamol (ROBAXIN-750) 750 MG tablet Take 1 tablet by mouth 2 times daily (Patient taking differently: Take 750 mg by mouth as needed ) 60 tablet 1    exenatide (BYETTA 10 MCG PEN) 10 MCG/0.04ML injection Inject 0.04 mLs into the skin daily (Patient taking differently: Inject 10 mcg into the skin 2 times daily (with meals) ) 3 pen 3    spironolactone (ALDACTONE) 25 MG tablet TAKE 1 TABLET DAILY 90 tablet 3    polyethylene glycol (GLYCOLAX) 17 GM/SCOOP powder Take 17 g by mouth daily      omeprazole (PRILOSEC) 20 MG delayed release capsule Take 20 mg by mouth daily      FreeStyle Lancets MISC 1 each by Does not apply route 4 times daily 200 each 5    ondansetron (ZOFRAN) 4 MG tablet Take 1 tablet by mouth 3 times daily as needed for Nausea or Vomiting 30 tablet 0    albuterol sulfate HFA (PROAIR HFA) 108 (90 Base) MCG/ACT inhaler Inhale 2 puffs into the lungs every 6 hours as needed for Wheezing 3 Inhaler 0    warfarin (COUMADIN) 5 MG tablet TAKE 1 TABLET DAILY (Patient taking differently: Monday and Thursday 2.5mg and 5 all other days) 100 tablet 4    montelukast (SINGULAIR) 10 MG tablet TAKE 1 TABLET NIGHTLY 90 tablet 4    Blood Glucose Monitoring Suppl (EMBRACE PRO GLUCOSE METER) GAETANO 1 Device by Does not apply route 4 times daily 1 Device 0    HUMALOG KWIKPEN 100 UNIT/ML SOPN TAKE AS INSTRUCTED BY YOUR PRESCRIBER (Patient taking differently: Only if high blood sugar-has not been using) 15 mL 8    nystatin (MYCOSTATIN) 384226 UNIT/ML suspension TAKE ONE TEASPOONFUL BY MOUTH FOUR TIMES A DAY FOR 5 DAYS 1 Bottle 2    aspirin 81 MG chewable tablet Take 1 tablet by mouth daily 30 tablet 3    BD PEN NEEDLE JANNET U/F 32G X 4 MM MISC USE 1 PEN NEEDLE FOUR TIMES A  each 3    vitamin B-12 500 MCG tablet Take 1 tablet by mouth daily 30 tablet 3    Blood Glucose Monitoring Suppl (FREESTYLE LITE) GAETANO 1 Device by Does not apply route 4 times daily Patient to check blood sugar 4 times a day and PRN, she is treated with multiple daily injections of insulin that require correction dosing ;A1C 8.1 ; ICD code-E11.65 ,Z79.4 (Patient taking differently: 1 Device by Does not apply route 2 times daily ) 1 Device 0     No current facility-administered medications for this visit. Allergies:  [id number]-rzjjsug-ymfftc [fluocinolone]; Ciprofloxacin; Diovan [valsartan]; Flagyl [metronidazole]; Metformin and related [metformin and related];  Benazepril; Morphine; Saxagliptin; and Levaquin [levofloxacin]     Review of Systems: No Abnormalities Noted  Neurological/Psychiatric:  Alert and oriented in all spheres  Moves all extremities well  No abnormalities of mood, affect, memory      All testing and labs listed below were personally reviewed. 4/24/19 ct  Of abd--neg for AAA  9/15/2020 echo--Left ventricular cavity size is dilated. There is normal wall thickness with a moderately severe reduction in   systolic function. LV ejection fraction is visually estimated to be 25-30%. Indeterminate diastolic function. The right ventricle is not well visualized but appears to be normal in size   with moderately reduced function. The left atrium is moderately dilated. A bioprosthetic mitral valve with a mean gradient of 7 mmHg. This may be a   normal gradient for this valve but could suggest mild stenosis. Trivial mitral regurgitation. The aortic valve is thickened/calcified with a mean gradient of 16 mmHg   consistent with at least mild aortic stenosis. This is likely underestimated   due to low cardiac output secondary to LV dysfunction. No significant aortic valve regurgitation. Moderate tricuspid regurgitation with RVSP of 48 mmHg.   Assessment:   Edema  Recommend support hose bilateral  Elevate legs   Decrease fluid intake  Will further evaluate EF with Muga    Weakness in legs  Stop crestor for one month    CAD  8/25/18 CABG with MAZE and WAYNE clip (Dr Ann-Marie Najera)  On asa without excess bleeding or bruising    Cardiomyopathy  8/2018 myoview  EF nl  9/2019 trivial MR and Mild AS  EF 45-50%  8/28/18 CABG MVR  9/20/19 EF 45-50%  10/30/19 JUDE EF 35-40%  9/15/2020 echo EF 25-30%  On coreg aldactone demadex  No ACE--Allergic to diovan benazepril--made her sob  Due to intolerance to Ace --cannot try entresto  Hard to get accurate EF with AF, Will further evaluate EF with Muga, and will refer her back to Dr Felton Kauffman for possible cardioversion    MVR  9/2019 trivial MR and Mild AS  EF 45-50%  8/28/18 CABG bioprosthetic MVR  9/15/2020 this intellectual property were performed in my presence and at my direction. Furthermore, the content and accuracy of this note have been reviewed by me Bernardo Valdovinos MD). Thank you for allowing me to participate in the care of this individual.    Camilo Estrada M.D., [facility].

## 2020-09-29 ENCOUNTER — TELEPHONE (OUTPATIENT)
Dept: CARDIOLOGY CLINIC | Age: 75
End: 2020-09-29

## 2020-09-29 NOTE — LETTER
[facility]    9/29/20  Andrea Dubin  1945  EP Procedures   Pacemaker implant (single/dual)  EP Study    ICD implant (single/dual)  Atrial flutter ablation (JUDE Y/N)    Biv implant ICD  Tilt Table    Biv implant PPM  Atrial fibrillation ablation (JUDE Yes)    Generator Change (PPM/ICD/BiV)  SVT ablation    Lead revision (RV/LA/RA) (<1 month)  VT ablation      Lead extraction +/- upgrade (BiV/PPM/ICD)  VT Ischemic/ non-ischemic    Loop implant/ removal  VT RVOT   xxx Cardioversion  VT Left sided   xxxx JUDE (Possible)  AVN ablation     Equipment   [employer]   MONALISA Mapping System    [employer]  Carto Mapping System    [employer]  CryoAblation    Biotronik  Laser Lead Extraction     EP Procedures Scheduling Request  # hours Requested   Scheduled  Date:   Specific Day 3 weeks/INR this week 1.8 Completed    Anesthesia  F/u Date:   CT surgery backup  COVID     Overnight stay      Location [facility]       Pre-Procedure Labs / Imaging   PT/INR  Type & cross    CBC  Units PRBC    BMP/Mg  Units FFP    Venogram  Cardiac CTA for Pulmonary vein mapping     RN INITIALS: RA    Patient Instructions  Do not eat or drink after midnight the night prior to procedure  Dx: Afib  On coumadin, last two INR's borderline will schedule 3 weeks out with JUDE possible if INRs not therapeutic.

## 2020-09-29 NOTE — TELEPHONE ENCOUNTER
Pt saw DGB on 9-28 and he wants her to see RMM first available for a possible cardioversion. First opening isn't until Dec. Can she see an NP or be worked into RMM schedule? Please advise. Thank you.

## 2020-09-29 NOTE — TELEPHONE ENCOUNTER
Call placed to Joon Branch regarding NPSR recommendation.  Letter also sent to Darryl Moran to schedule DCCV possible JUDE if INRs not therapeutic

## 2020-09-29 NOTE — TELEPHONE ENCOUNTER
Baldev Euceda MA called pt and relayed message per [facility]. Pt had questions, so the call was handed to me. The pt was questioning why she needed to see NO because she had just seen DGB. Was unsure why it was necessary. I told pt that a visit is set up prior to CV. I explained that they will determine if this is the best  Option and to answer any questions she may have. There are no available appointment until December. Is there anyone that we can squeeze the pt in with?

## 2020-10-01 LAB — INR BLD: 3.3

## 2020-10-02 ENCOUNTER — TELEPHONE (OUTPATIENT)
Dept: FAMILY MEDICINE CLINIC | Age: 75
End: 2020-10-02

## 2020-10-02 ENCOUNTER — ANTI-COAG VISIT (OUTPATIENT)
Dept: FAMILY MEDICINE CLINIC | Age: 75
End: 2020-10-02

## 2020-10-06 ENCOUNTER — TELEPHONE (OUTPATIENT)
Dept: FAMILY MEDICINE CLINIC | Age: 75
End: 2020-10-06

## 2020-10-06 ENCOUNTER — ANTI-COAG VISIT (OUTPATIENT)
Dept: FAMILY MEDICINE CLINIC | Age: 75
End: 2020-10-06

## 2020-10-06 LAB — INR BLD: 1.6

## 2020-10-06 NOTE — TELEPHONE ENCOUNTER
Pt advise and stated she is confused as she held med for 2 days and started her normal dose of 2.5 mg M,Thur; 5 mg all other days. Pt held med Thursday, Friday, 5 mg Saturday and Sunday, 2.5mg on Monday. Once I ask what was her normal dose and I read the dose on her flowsheets  she stated that was changed on her last visit.  Pt wants to make sure that this is exactly what you want her to do

## 2020-10-06 NOTE — TELEPHONE ENCOUNTER
Recommendation on a clinical be 2.5 mg Monday Wednesday Friday and 5 mg other days.   Recheck INR in 1 week

## 2020-10-06 NOTE — TELEPHONE ENCOUNTER
[facility] [phone] stating that she has a out of range INR      INR   1.6    10/6/20

## 2020-10-13 ENCOUNTER — ANTI-COAG VISIT (OUTPATIENT)
Dept: FAMILY MEDICINE CLINIC | Age: 75
End: 2020-10-13

## 2020-10-13 ENCOUNTER — TELEPHONE (OUTPATIENT)
Dept: FAMILY MEDICINE CLINIC | Age: 75
End: 2020-10-13

## 2020-10-13 LAB — INR BLD: 1.4

## 2020-10-15 ENCOUNTER — OFFICE VISIT (OUTPATIENT)
Dept: PRIMARY CARE CLINIC | Age: 75
End: 2020-10-15
Payer: MEDICARE

## 2020-10-15 PROCEDURE — 99211 OFF/OP EST MAY X REQ PHY/QHP: CPT | Performed by: NURSE PRACTITIONER

## 2020-10-15 NOTE — PATIENT INSTRUCTIONS
You have received a viral test for COVID-19. Below is education on quarantine per the CDC guidelines. For any symptoms, seek care from your PCP, call [phone] to establish care with a doctor, or go directly to an urgent care or the emergency room. Test results will take 2-7 days and will be sent to you in your [employer] account. If you test positive, you will be contacted via phone. If you test negative, the ONLY communication will be through [address] GO TO [employer] AND SIGN UP FOR [employer]  (LOWER LEFT OF THE HOME PAGE)  No test is 100%. If you have symptoms, you should follow the guidance of quarantine as previously stated. You can still be contagious if you have symptoms. Your [facility] will reach out to you if you have a positive result. They will provide you with a return to work date and note. If you were tested for a pre-op, then you should remain in quarantine until your procedure. How do I know if I need to be in quarantine? If you live in a community where COVID-19 is or might be spreading (currently, that is virtually everywhere in the [place])  Be alert for symptoms. Watch for fever, cough, shortness of breath, or other symptoms of COVID-19.  Take your temperature if symptoms develop.  Practice social distancing. Maintain 6 feet of distance from others and stay out of crowded places.  Follow CDC guidance if symptoms develop. If you feel healthy but:   Recently had close contact with a person with COVID-19 you need to Quarantine:   Stay home until 14 days after your last exposure.  Check your temperature twice a day and watch for symptoms of COVID-19.  If possible, stay away from people who are at higher-risk for getting very sick from COVID-19.   Stay Home and Monitor Your Health if you:   Have been diagnosed with COVID-19, or   Are waiting for test results, or   Have cough, fever, or shortness of breath, or symptoms of COVID-19      When You Can be Around Others After You Had or Likely Had COVID-19     If you have or think you might have COVID-19, it is important to stay home and away from other people. Staying away from others helps stop the spread of COVID-19. If you have an emergency warning sign (including trouble breathing), get emergency medical care immediately. When you can be around others (end home isolation) depends on different factors for different situations. Find CDC's recommendations for your situation below. I think or know I had COVID-19, and I had symptoms  You can be with others after   3 days with no fever and   Respiratory symptoms have improved (e.g. cough, shortness of breath) and   10 days since symptoms first appeared  Depending on your healthcare provider's advice and availability of testing, you might get tested to see if you still have COVID-19. If you will be tested, you can be around others when you have no fever, respiratory symptoms have improved, and you receive two negative test results in a row, at least 24 hours apart. I tested positive for COVID-19 but had no symptoms  If you continue to have no symptoms, you can be with others after:   10 days have passed since test or 14 days since your exposure test   Depending on your healthcare provider's advice and availability of testing, you might get tested to see if you still have COVID-19. If you will be tested, you can be around others after you receive two negative test results in a row, at least 24 hours apart. If you develop symptoms after testing positive, follow the guidance above for I think or know I had COVID, and I had symptoms.   For Anyone Who Has Been Around a Person with COVID-19  It is important to remember that anyone who has close contact with someone with COVID-19 should stay home for 14 days after exposure based on the time it takes to develop illness. Testing is not necessary.     www.cdc.gov/coronavirus/2019-ncov/index.html

## 2020-10-15 NOTE — PROGRESS NOTES
Roger Carranza received a viral test for COVID-19. They were educated on isolation and quarantine as appropriate. For any symptoms, they were directed to seek care from their PCP, given contact information to establish with a doctor, directed to an urgent care or the emergency room.

## 2020-10-16 LAB — SARS-COV-2, NAA: NOT DETECTED

## 2020-10-18 NOTE — RESULT ENCOUNTER NOTE
Your Covid-19 test resulted not detected/negative. What happens if I have a negative test?    Remember to wash your hands often, avoid touching your face, stay 6 feet from people you do not live with, and wear a cloth facemask when you go out in public. A negative COVID-19 test at one point in time does not mean you will stay negative. You could become ill with COVID-19 and/or test positive at any time. If you are a close contact of a confirmed or suspected case, continue to stay home and away from others until 14 days after your last exposure. If you do not have symptoms, and were not in close contact with a confirmed or suspected case, you can stop isolating. If you currently have symptoms of COVID-19, and were not in close contact with a confirmed or suspected case, you should keep monitoring symptoms and talk to your doctor or other healthcare provider about staying home and if you need to get tested again. If you develop symptoms of COVID-19, stay at home and away from others and talk to your doctor or other healthcare provider about getting tested again. For additional information, visit coronavirus. [place].gov. For answers to your COVID-19 questions, call [phone]-[facility] ([phone]).

## 2020-10-20 ENCOUNTER — ANTI-COAG VISIT (OUTPATIENT)
Dept: FAMILY MEDICINE CLINIC | Age: 75
End: 2020-10-20

## 2020-10-20 LAB — INR BLD: 2.2

## 2020-10-21 ENCOUNTER — HOSPITAL ENCOUNTER (OUTPATIENT)
Dept: CARDIAC CATH/INVASIVE PROCEDURES | Age: 75
Discharge: HOME | End: 2020-10-21
Attending: INTERNAL MEDICINE | Admitting: INTERNAL MEDICINE
Payer: MEDICARE

## 2020-10-21 ENCOUNTER — ANTI-COAG VISIT (OUTPATIENT)
Dept: FAMILY MEDICINE CLINIC | Age: 75
End: 2020-10-21

## 2020-10-21 ENCOUNTER — TELEPHONE (OUTPATIENT)
Dept: FAMILY MEDICINE CLINIC | Age: 75
End: 2020-10-21

## 2020-10-21 VITALS
SYSTOLIC BLOOD PRESSURE: 131 MMHG | HEART RATE: 108 BPM | WEIGHT: 202 LBS | HEIGHT: 68 IN | TEMPERATURE: 98 F | OXYGEN SATURATION: 98 % | DIASTOLIC BLOOD PRESSURE: 92 MMHG | RESPIRATION RATE: 18 BRPM | BODY MASS INDEX: 30.62 KG/M2

## 2020-10-21 LAB
ANION GAP SERPL CALCULATED.3IONS-SCNC: 7 MMOL/L (ref 3–16)
BUN BLDV-MCNC: 28 MG/DL (ref 7–20)
CALCIUM SERPL-MCNC: 8.8 MG/DL (ref 8.3–10.6)
CHLORIDE BLD-SCNC: 106 MMOL/L (ref 99–110)
CO2: 28 MMOL/L (ref 21–32)
CREAT SERPL-MCNC: 1.3 MG/DL (ref 0.6–1.2)
EKG ATRIAL RATE: 141 BPM
EKG ATRIAL RATE: 67 BPM
EKG DIAGNOSIS: NORMAL
EKG DIAGNOSIS: NORMAL
EKG Q-T INTERVAL: 364 MS
EKG Q-T INTERVAL: 448 MS
EKG QRS DURATION: 82 MS
EKG QRS DURATION: 84 MS
EKG QTC CALCULATION (BAZETT): 462 MS
EKG QTC CALCULATION (BAZETT): 487 MS
EKG R AXIS: 44 DEGREES
EKG R AXIS: 71 DEGREES
EKG T AXIS: 110 DEGREES
EKG T AXIS: 87 DEGREES
EKG VENTRICULAR RATE: 108 BPM
EKG VENTRICULAR RATE: 64 BPM
GFR AFRICAN AMERICAN: 48
GFR NON-AFRICAN AMERICAN: 40
GLUCOSE BLD-MCNC: 128 MG/DL (ref 70–99)
INR BLD: 2.9
LV EF: 25 %
LVEF MODALITY: NORMAL
POTASSIUM SERPL-SCNC: 4.1 MMOL/L (ref 3.5–5.1)
SODIUM BLD-SCNC: 141 MMOL/L (ref 136–145)
TSH SERPL DL<=0.05 MIU/L-ACNC: 2.01 UIU/ML (ref 0.27–4.2)

## 2020-10-21 PROCEDURE — 2500000003 HC RX 250 WO HCPCS

## 2020-10-21 PROCEDURE — 85610 PROTHROMBIN TIME: CPT

## 2020-10-21 PROCEDURE — 92960 CARDIOVERSION ELECTRIC EXT: CPT | Performed by: INTERNAL MEDICINE

## 2020-10-21 PROCEDURE — 7100000010 HC PHASE II RECOVERY - FIRST 15 MIN

## 2020-10-21 PROCEDURE — 99152 MOD SED SAME PHYS/QHP 5/>YRS: CPT

## 2020-10-21 PROCEDURE — 36415 COLL VENOUS BLD VENIPUNCTURE: CPT

## 2020-10-21 PROCEDURE — 93005 ELECTROCARDIOGRAM TRACING: CPT | Performed by: INTERNAL MEDICINE

## 2020-10-21 PROCEDURE — 80048 BASIC METABOLIC PNL TOTAL CA: CPT

## 2020-10-21 PROCEDURE — 93010 ELECTROCARDIOGRAM REPORT: CPT | Performed by: INTERNAL MEDICINE

## 2020-10-21 PROCEDURE — 93312 ECHO TRANSESOPHAGEAL: CPT

## 2020-10-21 PROCEDURE — 92960 CARDIOVERSION ELECTRIC EXT: CPT

## 2020-10-21 PROCEDURE — 99152 MOD SED SAME PHYS/QHP 5/>YRS: CPT | Performed by: INTERNAL MEDICINE

## 2020-10-21 PROCEDURE — 93325 DOPPLER ECHO COLOR FLOW MAPG: CPT

## 2020-10-21 PROCEDURE — 93320 DOPPLER ECHO COMPLETE: CPT

## 2020-10-21 PROCEDURE — 84443 ASSAY THYROID STIM HORMONE: CPT

## 2020-10-21 NOTE — TELEPHONE ENCOUNTER
----- Message from Kitty Kim sent at 10/21/2020  2:25 PM EDT -----  Subject: Message to Provider    QUESTIONS  Information for Provider? Pt's  called in stating the pt's INR came   back 2.9 and wanted to know if anything needs to be done. Please Advise   ---------------------------------------------------------------------------  --------------  CALL BACK INFO  What is the best way for the office to contact you? OK to leave message on   voicemail  Preferred Call Back Phone Number? [phone]  ---------------------------------------------------------------------------  --------------  SCRIPT ANSWERS  Relationship to Patient? Other  Representative Name? Dilip Garcia  Is the Representative on the appropriate HIPAA document in Epic?  Yes

## 2020-10-21 NOTE — PROCEDURES
[address]     Electrophysiology Procedure Note       Date of Procedure: 10/21/2020  Patient's Name: Ofelia Tavarez  YOB: 1945   Medical Record Number: [id number]  Procedure Performed by: Lee Ann Vasquez MD    Procedures performed:  IV sedation. Trans-esophageal echocardiography  External Electrical cardioversion     Indication of the procedure: Persistent atrial fibrillation     Details of procedure: The patient was brought to the cath lab area in a fasting and non-sedated state. The risks, benefits and alternatives of the procedure were discussed with the patient. The patient opted to proceed with the procedure. Written informed consent was signed and placed in the chart. A timeout protocol was completed to identify the patient and the procedure being performed. IV sedation was provided with IV Versed, Fentanyl initially and JUDE was performed which did not show any WAYNE/LA clot/thrombus. Full JUDE reports will be dictated. Patient is on chronic anticoagulation therapy. Then we used brevital for sedation and electrical DC cardioversion was perfomred using 200J, synchronized shock. Patient was converted to sinus rhythm. The patient tolerated the procedure well and there were no complications.      Conclusion:   Successful external DC cardioversion of atrial fibrillation

## 2020-10-21 NOTE — H&P
[facility]   Electrophysiology      Date: 10/21/2020    History of Present Illness: Nelly Child is 69 y.o. female with a past medical history of HTN, CAD, DM, HLD, PSVT, PAF. Hx of DVT/PE on coumadin. S/p ILR (8/16/18). S/p CABG x3 with MAZE and WAYNE atriaclip (Dr. Vahe Cohen, 8/25/18). Previous left thigh infected hematoma in September of 2018, INR up to 8. S/p RFCA with PVI, additional ablation with completion of roof line and posterior box, additional ablation of CTI right atrial flutter (10/30/19). During recovery, noted to have episodes of 1st degree AV block and Wenckebach on telemetry. Her loop interrogation shows multiple episodes of atrial fibrillation, although most episodes are under 30 minutes (Longest 6 hours). She feels pretty well, much better since her heart rate has come up since the ablation. She is unaware of her irregular heart beat today. She reports she was mildly SOB walking down to the office, but this is a chronic issue for her that is no worse than her normal. Denies chest pain, dizziness, palpitations, orthopnea or heart racing. Of note, she had prolonged CA interval and 2nd degree Mobitz I (Wenckebach) post ablation and her digoxin was stopped. She is compliant with her medications. Her blood pressure and weight are stable. She does not smoke cigarettes or drink alcohol. Denies having chest pain, palpitations, shortness of breath, orthopnea/PND, cough, or dizziness at the time of this visit. With regard to medication therapy the patient has been compliant with prescribed regimen. They have tolerated therapy to date. She has recurrent atrial fibrillation. She is here for JUDE and cardioversion. She has had Maze in the past.     Allergies:   Allergies   Allergen Reactions    Ztbhhrri-Eghauif-Sdkrin [Fluocinolone] Shortness Of Breath    Ciprofloxacin Shortness Of Breath    Diovan [Valsartan] Shortness Of Breath    Flagyl [Metronidazole] Shortness Of Breath     Has taken diflucan at home 12/7/15    Metformin And Related [Metformin And Related] Shortness Of Breath    Benazepril      Other reaction(s): Not Recorded    Morphine      Bad reaction. \"makes her feel horrible\".  Saxagliptin      Other reaction(s): Not Recorded    Levaquin [Levofloxacin] Rash     Home Medications:  Prior to Visit Medications    Medication Sig Taking?  Authorizing Provider   potassium chloride (KLOR-CON M) 10 MEQ extended release tablet TAKE 1 TABLET DAILY Yes Funmilayo Freeman MD   torsemide (DEMADEX) 100 MG tablet Take 1 tablet by mouth daily TAKE 1 TABLET DAILY Yes Funmilayo Freeman MD   rosuvastatin (CRESTOR) 20 MG tablet TAKE 1 TABLET DAILY Yes Funmilayo Freeman MD   carvedilol (COREG) 12.5 MG tablet Take 1 tablet by mouth 2 times daily TAKE 1 TABLET TWICE A DAY WITH MEALS Yes Funmilayo Freeman MD   methocarbamol (ROBAXIN-750) 750 MG tablet Take 1 tablet by mouth 2 times daily  Patient taking differently: Take 750 mg by mouth as needed  Yes Funmilayo Freeman MD   spironolactone (ALDACTONE) 25 MG tablet TAKE 1 TABLET DAILY Yes Funmilayo Freeman MD   omeprazole (PRILOSEC) 20 MG delayed release capsule Take 20 mg by mouth daily Yes Historical Provider, MD   warfarin (COUMADIN) 5 MG tablet TAKE 1 TABLET DAILY  Patient taking differently: Monday and Thursday 2.5mg and 5 all other days Yes Marci Tse MD   aspirin 81 MG chewable tablet Take 1 tablet by mouth daily Yes Funmilayo Freeman MD   vitamin B-12 500 MCG tablet Take 1 tablet by mouth daily Yes Britney Iyer MD   blood glucose test strips (FREESTYLE LITE) strip USE TO TEST FOUR TIMES A DAY  Funmilayo Freeman MD   insulin glargine (LANTUS SOLOSTAR) 100 UNIT/ML injection pen INJECT 50 UNITS IN THE MORNING AND 24 UNITS AT BEDTIME  Patient taking differently: every morning 30 units in the morning and PRN at night if needed  Funmilayo Freeman MD   albuterol (PROVENTIL) (2.5 MG/3ML) 0.083% nebulizer solution Take 3 mLs by nebulization every 6 hours as needed for Wheezing  Kerri Hermosillo MD   exenatide (BYETTA 10 MCG PEN) 10 MCG/0.04ML injection Inject 0.04 mLs into the skin daily  Patient taking differently: Inject 10 mcg into the skin 2 times daily (with meals)   Kerri Hermosillo MD   polyethylene glycol (GLYCOLAX) 17 GM/SCOOP powder Take 17 g by mouth daily  Historical Provider, MD Davisyle Lancets MISC 1 each by Does not apply route 4 times daily  Kerri Hermosillo MD   ondansetron (ZOFRAN) 4 MG tablet Take 1 tablet by mouth 3 times daily as needed for Nausea or Vomiting  Kerri Hermosillo MD   albuterol sulfate HFA (PROAIR HFA) 108 (90 Base) MCG/ACT inhaler Inhale 2 puffs into the lungs every 6 hours as needed for Wheezing  Kerri Hermosillo MD   montelukast (SINGULAIR) 10 MG tablet TAKE 1 TABLET NIGHTLY  Antonieta Chamorro MD   Blood Glucose Monitoring Suppl (EMBRACE PRO GLUCOSE METER) GAETANO 1 Device by Does not apply route 4 times daily  Kerri Hermosillo MD   HUMALOG KWIKPEN 100 UNIT/ML SOPN TAKE AS INSTRUCTED BY YOUR PRESCRIBER  Patient taking differently:  Only if high blood sugar-has not been using  Kerri Hermosillo MD   nystatin (MYCOSTATIN) 646410 UNIT/ML suspension TAKE ONE TEASPOONFUL BY MOUTH FOUR TIMES A DAY FOR 5 DAYS  Kerri Hermosillo MD   BD PEN NEEDLE JANNET U/F 32G X 4 MM MISC USE 1 PEN NEEDLE FOUR TIMES A DAY  Kerri Hermosillo MD   Blood Glucose Monitoring Suppl (FREESTYLE LITE) GAETANO 1 Device by Does not apply route 4 times daily Patient to check blood sugar 4 times a day and PRN, she is treated with multiple daily injections of insulin that require correction dosing ;A1C 8.1 ; ICD code-E11.65 ,Z79.4  Patient taking differently: 1 Device by Does not apply route 2 times daily   Cl Buchanan, APRN - CNP      Past Medical History:  Past Medical History:   Diagnosis Date    Asthma     Atrial fibrillation (HCC)     Eosinophilia     Hemoptysis     HIGH CHOLESTEROL     Hx of blood clots     Hypertension     Irregular heart beat     Other specified gastritis without mention of hemorrhage     Palpitations     Skin cancer     basal and squamous    Type II or unspecified type diabetes mellitus without mention of complication, not stated as uncontrolled      Past Surgical History:    has a past surgical history that includes Cholecystectomy;  section; Colonoscopy (2017); skin biopsy; bronchoscopy (2016); Coronary artery bypass graft (2018); Mitral valve replacement (2018); transesophageal echocardiogram (2018); Tunneled venous catheter placement (Left, 2018); Cardiac catheterization (2018); Insertable Cardiac Monitor (Left, 2018); Colonoscopy (2014); Hysterectomy; Upper gastrointestinal endoscopy (N/A, 10/10/2018); Colonoscopy (10/10/2018); and Colonoscopy (N/A, 2020). Social History:  Reviewed. reports that she has never smoked. She has never used smokeless tobacco. She reports that she does not drink alcohol or use drugs. Family History:  Reviewed. family history includes Asthma in her mother; Cancer in her father; Heart Disease in her mother; High Blood Pressure in her mother; Hypertension in her mother.      Review of System:  · Constitutional: Negative for fever, night sweats, chills, weight changes, or weakness  · Skin: Negative for rash, dry skin, pruritus, bruising, bleeding, blood clots, or changes in skin pigment  · HEENT: Negative for vision changes, ringing in the ears, sore throat, dysphagia, or swollen lymph nodes  · Respiratory: Positive for SOB (Chronic)  · Cardiovascular: Reviewed in HPI  · Gastrointestinal: Negative for abdominal pain, N/V/D, constipation, or black/tarry stools  · Genito-Urinary: Negative for dysuria, incontinence, urgency, or hematuria  · Musculoskeletal: Negative for joint swelling, muscle pain, or injuries  · Neurological/Psych: Negative for confusion, seizures, dizziness, headaches, balance issues or TIA-like symptoms. No anxiety, depression, or insomnia    Physical Examination:  Vitals:    10/21/20 1116   BP: (!) 131/92   Pulse: 108   Resp: 18   Temp: 98 °F (36.7 °C)   SpO2: 98%      Wt Readings from Last 3 Encounters:   10/21/20 202 lb (91.6 kg)   09/28/20 202 lb 9.6 oz (91.9 kg)   09/23/20 198 lb 4 oz (89.9 kg)     Constitutional: Cooperative and in no apparent distress, and appears well nourished  Skin: Warm and pink; no pallor, cyanosis, bruising, or clubbing  HEENT: Symmetric and normocephalic. PERRL, EOM intact. Conjunctiva pink with clear sclera. Mucus membranes pink and moist. Teeth intact. Thyroid smooth without nodules or goiter  Respiratory: Respirations symmetric and unlabored. Lungs clear to auscultation bilaterally, no wheezing, rhonchi, or crackles  Cardiovascular:  Tachycardic rate and irregular rhythm. S1/S2 present without murmurs, rubs, or gallops. Peripheral pulses 2+, capillary refill < 3 seconds. No elevation of JVP. Trace BLE edema  Gastrointestinal: Abdomen soft and round. Bowel sounds normoactive in all quadrants without tenderness or masses. + Obese  Musculoskeletal: Bilateral upper and lower extremity strength 5/5 with full ROM. Neurological/Psych: Awake and orientated to person, place and time. Calm affect, appropriate mood. Pertinent labs, diagnostic, device, and imaging results reviewed as a part of this visit    LABS    CBC:   Lab Results   Component Value Date    WBC 4.1 09/02/2020    HGB 10.7 (L) 09/02/2020    HCT 33.8 (L) 09/02/2020    MCV 85.4 09/02/2020     09/02/2020     BMP:   Lab Results   Component Value Date    CREATININE 1.3 (H) 10/21/2020    BUN 28 (H) 10/21/2020     10/21/2020    K 4.1 10/21/2020     10/21/2020    CO2 28 10/21/2020     Estimated Creatinine Clearance: 46 mL/min (A) (based on SCr of 1.3 mg/dL (H)).      Thyroid: No results found for: TSH, Z2NBLUK, F8FNTVQ, THYROIDAB  Lipid Panel:   Lab Results   Component Value Date CHOL 131 06/12/2019    CHOL 98 06/12/2019    HDL 33 06/12/2019    TRIG 202 06/12/2019     LFTs:  Lab Results   Component Value Date    ALT 15 09/02/2020    AST 16 09/02/2020    ALKPHOS 111 09/02/2020    BILITOT 0.6 09/02/2020     Coags:   Lab Results   Component Value Date    PROTIME 21.5 (H) 05/18/2020    INR 2.20 10/20/2020    APTT 39.1 (H) 05/14/2020       ECG: 10/21/2020  - Atrial fibrillation with incomplete LBBB, rate 101, QRS 96, QTc 439    JUDE: 10/30/19  Normal left ventricular cavity size and wall thickness. Global left   ventricular function is moderate-to-severely decreased with ejection   fraction estimated from 35 % to 40 %. Severe apical akinesis noted. The bioprosthetic mitral valve is well seated with a mean gradient of 2 mmHg   and maximum pressure gradient of 5 mmHg. There is trivial mitral   regurgitation. Left atrial enlargement. Spontaneous echo contrast seen in the left atrium. Stump of the left atrial appendage noted with no thrombus. The aortic valve is thickened/calcified with decreased leaflet mobility   consistent with aortic stenosis. There is trivial aortic insufficiency. Echo: 9/20/19   -Borderline global systolic function with an ejection fraction estimated at   45-50%. -Apical akinesis noted.   -Left atrial enlargement noted based on volume index.   -The bioprosthetic mitral valve is well seated with peak velocity of 2.58m/s   and a mean gradient of 9 mmHg. The mitral valve area by pressure halftime is   estimated at 2.22 cm^2. There is trivial mitral regurgitation.   -Mild aortic stenosis with a peak velocity of 2.39m/s and a mean pressure   gradient of 13 mmHg. The aortic valve area is estimated at 1.16 cm^2 by   continuity equation and 1.22 cm^2 by planimetry. There is trivial aortic   insufficiency.   -There is mild-to-moderate tricuspid regurgitation with a RVSP estimation of   47 mmHg. -The IVC is dilated . -Indeterminate diastolic function.     GXT: 8/2018  Small sized lateral completely reversible defect of mild intensity    consistent with ischemia in the territory of the LCx and/or LAD .    Normal LV function.    Overall findings represent a intermediate risk scan. Cath: 8/2018  Heavily calcified vessels  Mild AS-Normal LV FXN  90% mid LAD  90% prox Diag 1  90% mid Cx  Mild Dominant RCA     REC: CVTS opinion--Heavy Ca, DM, Chronic AC Rx---CABG vs LAD/Diag stenting-    Assessment:    1. Persistent Atrial Fibrillation  - S/p RFCA with PVI, additional ablation with completion of roof line and posterior box, additional ablation of CTI right atrial flutter (10/30/19, Dr. Radha Doll)  - Right groin stable, no hematoma/swelling/oozing     - Currently in atrial fibrillation, rate 90/100s  - Continue coreg 6.25 mg BID   ~ Digoxin stopped post ablation due to 1st degree/wenckebach    - [id number] score: 5 (Age, Gender, HTN, CAD, DM) ; [id number] Vasc score and anticoagulation discussed. High risk for stroke and thromboembolism. Anticoagulation is recommended.   ~ On coumadin; INR 2.80 (12/19/19). Followed by anticoagulation clinic      - Afib risk factors including age, HTN, obesity, inactivity and IRIS were discussed with patient. Risk factor modification recommended              ~ TSH 0.41 (10/2019)     Recurrent atrial fibrillation. She is here for JUDE/Atrial fibrillation       2. Implantable Loop Recorder  - S/p ILR insertion on 8/16/18  -The CIED was interrogated and programmed and I supervised and reviewed all the data. All findings and changes are in device interrogation sheat and reflect my personal interpretation and changes and is scanned to Epic  - Device shows: Multiple episodes of AF (most under 30 minutes in length)  - Follow up with device clinic as scheduled    3. CAD  - S/p CABG with MAZE and WAYNE clip (8/25/18, Dr. Graeme Frey)  - Stable  - No complaints of angina  - Continue ASA, BB, and statin     4.  HTN  - Controlled: Goal <130/80  - Continue current medications  - Encouraged to monitor and log BP readings at home, then bring log to next visit  - Discussed importance of low sodium diet, weight control and exercise     5. Mitral Regurg, aortic stenosis  - Hx of MVR (2018)  - Trivial MR and mild AS by echo (9/2019)  - Asymptomatic  - Follow up with surveillance echo annually     6. Chronic combined heart failure (NYHA Class II)  - Appears compensated              ~ EF 50% per echo (9/2019); down to 35-40% on JUDE pre-ablation    ~ No s/s of CHF; weight stable, lungs clear  - Continue with coreg 6.25 mg BID, spironolactone 25 mg QD, torsemide 20 mg QD  - Aggressive medical therapy with risk factor modification  - Discussed with patient importance of monitoring weight, low sodium diet and fluid restriction    7. Hx of DVT   - On coumadin   - Ok to come off coumadin short-term if needed from afib standpoint due to left atrial appendage ligation    Diet & Exercise:   The patient is counseled to follow a low salt diet to assure blood pressure remains controlled for cardiovascular risk factor modification   The patient is counseled to avoid excess caffeine, and energy drinks as this may exacerbated ectopy and arrhythmia   The patient is counseled to lose weight to control cardiovascular risk factors   Exercise program discussed: To improve overall cardiovascular health, the patient is instructed to increase cardiovascular related activities with a goal of 150 min/week of moderate level activity or 10,000 steps per day. Encouraged to perform as much activity as tolerated    Quality Metrics  1. Tobacco Cessation Counseling: N/A  2. Retake of BP if >140/90: N/A  3. Documentation to PCP: Note sent to PCP office visit  4. CAD patient on anti-platelet: N/A   5.   CAD patient on STATIN therapy: N/A   6.    Patient with history of CHF and atrial fibrillation on anticoagulation: Yes (Coumadin)     I have addressed the patient's cardiac risk factors and adjusted Thursday 2.5mg and 5 all other days 2/26/20  Yes Deep Ch MD   aspirin 81 MG chewable tablet Take 1 tablet by mouth daily 6/7/19  Yes Makenzie Nathan MD   vitamin B-12 500 MCG tablet Take 1 tablet by mouth daily 10/12/18  Yes Apple Hernandez MD   blood glucose test strips (FREESTYLE LITE) strip USE TO TEST FOUR TIMES A DAY 7/20/20   Makenzie Nathan MD   insulin glargine (LANTUS SOLOSTAR) 100 UNIT/ML injection pen INJECT 50 UNITS IN THE MORNING AND 24 UNITS AT BEDTIME  Patient taking differently: every morning 30 units in the morning and PRN at night if needed 7/10/20   Makenzie Nathan MD   albuterol (PROVENTIL) (2.5 MG/3ML) 0.083% nebulizer solution Take 3 mLs by nebulization every 6 hours as needed for Wheezing 6/2/20   Makenzie Nathan MD   exenatide (BYETTA 10 MCG PEN) 10 MCG/0.04ML injection Inject 0.04 mLs into the skin daily  Patient taking differently: Inject 10 mcg into the skin 2 times daily (with meals)  5/29/20   Makenzie Nathan MD   polyethylene glycol (GLYCOLAX) 17 GM/SCOOP powder Take 17 g by mouth daily    Historical Provider, MD   FreeStyle Lancets MISC 1 each by Does not apply route 4 times daily 4/29/20   Makenzie Nathan MD   ondansetron (ZOFRAN) 4 MG tablet Take 1 tablet by mouth 3 times daily as needed for Nausea or Vomiting 3/26/20   Makenzie Nathan MD   albuterol sulfate HFA (PROAIR HFA) 108 (90 Base) MCG/ACT inhaler Inhale 2 puffs into the lungs every 6 hours as needed for Wheezing 3/12/20   Makenzie Nathan MD   montelukast (SINGULAIR) 10 MG tablet TAKE 1 TABLET NIGHTLY 2/26/20   Deep Ch MD   Blood Glucose Monitoring Suppl (EMBRACE PRO GLUCOSE METER) GAETANO 1 Device by Does not apply route 4 times daily 2/4/20   Makenzie Nathan MD   HUMALOG KWIKPEN 100 UNIT/ML SOPN TAKE AS INSTRUCTED BY YOUR PRESCRIBER  Patient taking differently:  Only if high blood sugar-has not been using 12/30/19   Makenzie Nathan MD   nystatin (MYCOSTATIN) 497720 UNIT/ML REPLACEMENT  08/21/2018    Dr. Brandon Cho - 27mm Medtronic Cinch tissue valve    SKIN BIOPSY      TRANSESOPHAGEAL ECHOCARDIOGRAM  08/21/2018    during CABG/MVR    TUNNELED VENOUS CATHETER PLACEMENT Left 08/23/2018    Dr. Britton Sat for HD---since removed    UPPER GASTROINTESTINAL ENDOSCOPY N/A 10/10/2018    w/biopsy performed by Jeff Aburto MD at [address]   Allergen Reactions    Fiwuezgf-Qwnpumk-Nycylw [Fluocinolone] Shortness Of Breath    Ciprofloxacin Shortness Of Breath    Diovan [Valsartan] Shortness Of Breath    Flagyl [Metronidazole] Shortness Of Breath     Has taken diflucan at home 12/7/15    Metformin And Related [Metformin And Related] Shortness Of Breath    Benazepril      Other reaction(s): Not Recorded    Morphine      Bad reaction. \"makes her feel horrible\".  Saxagliptin      Other reaction(s): Not Recorded    Levaquin [Levofloxacin] Rash       Pre-Sedation Documentation and Exam:   I have personally completed a history, physical exam & review of systems for this patient (see notes).     Mallampati Airway Assessment:  Class I     Prior History of Anesthesia Complications:   None    ASA Classification:  Class 3 - A patient with severe systemic disease that limits activity but is not incapacitating    Sedation/ Anesthesia Plan:   Intravenous sedation    Medications Planned:   Brevital intravenously     Patient is an appropriate candidate for plan of sedation:   Yes    Electronically signed by Anam Lovelace MD on 10/21/2020 at 11:49 AM

## 2020-10-22 ENCOUNTER — TELEPHONE (OUTPATIENT)
Dept: CARDIOLOGY CLINIC | Age: 75
End: 2020-10-22

## 2020-10-22 LAB — INR BLD: 2.9 (ref 0.86–1.14)

## 2020-10-22 NOTE — PROGRESS NOTES
Patient s/p JUDE and cardioversion. She has severe LV dysfunction. Aortic valve appears severely calcified with limited mobility of leaflets. Severity of stenosis could be underestimated due to low flow low gradient aortic stenosis. Will refer to Dr. Herbert Moreland for further evaluation and potential consideration of TAVR, if indicated.      Lee Nuñez MD, MPH  [place] [address]   Office: [phone]

## 2020-10-23 ENCOUNTER — TELEPHONE (OUTPATIENT)
Dept: FAMILY MEDICINE CLINIC | Age: 75
End: 2020-10-23

## 2020-10-23 ENCOUNTER — OFFICE VISIT (OUTPATIENT)
Dept: FAMILY MEDICINE CLINIC | Age: 75
End: 2020-10-23
Payer: MEDICARE

## 2020-10-23 VITALS
OXYGEN SATURATION: 97 % | DIASTOLIC BLOOD PRESSURE: 76 MMHG | SYSTOLIC BLOOD PRESSURE: 118 MMHG | BODY MASS INDEX: 30.87 KG/M2 | TEMPERATURE: 96.8 F | HEART RATE: 79 BPM | WEIGHT: 203 LBS

## 2020-10-23 PROBLEM — R00.0 TACHYCARDIA: Status: RESOLVED | Noted: 2020-09-23 | Resolved: 2020-10-23

## 2020-10-23 PROCEDURE — 99214 OFFICE O/P EST MOD 30 MIN: CPT | Performed by: FAMILY MEDICINE

## 2020-10-23 NOTE — TELEPHONE ENCOUNTER
[employer] referral scanned to chart. Office referral put in [facility] for Dr. Bret Thakur.

## 2020-10-23 NOTE — PROGRESS NOTES
Subjective:      Patient ID: Peter Davila is a 69 y.o. female. CC: Patient presents for re-evaluation of chronic health problems including diabetes mellitus, atrial fibrillation, left neck and head pain, and aortic valve problems. HPI Patient presents today for a follow-up on chronic medications and medical conditions. Patient took her INR at home today and it was 2.2 . Patient is still having pain in her neck that won't stop. Patient been having right-sided neck pain for some time but she states is much worse and persistent at this point in time. The discomfort is all along her right neck with radiation to the occipital area. No right arm discomfort. She has tried over-the-counter medications and is limited because she is unable to take anti-inflammatory medications with her being on Coumadin. She also had a recent cardioversion for atrial fibrillation and quickly went back to normal sinus rhythm. Patient cannot tell that she was in atrial fibrillation in the past.  She is on long-term anticoagulation. She does home monitoring. Patient feels her diabetes mellitus overall is controlled. She does weigh herself every day and her weight has been stable now for some time. Eye exam current (within one year): Yes    Checks sugars at home: yes  Home blood sugar records: patient tests 4 time(s) per day  Any episodes of hypoglycemia?  No    Current medication use: taking as prescribed  Medication side effects: none     Current diet: well balanced, on average, 3 meals per day  Current exercise:not active    Review of Systems     Patient Active Problem List   Diagnosis    Long term current use of anticoagulant    Hyperlipidemia    Essential hypertension    Generalized osteoarthrosis, involving multiple sites    Eosinophilic gastritis    Paroxysmal SVT (supraventricular tachycardia) (HCC)    B12 deficiency    History of pulmonary embolism    Multiple pulmonary nodules    Type 2 diabetes mellitus with hyperglycemia, with long-term current use of insulin ([facility] [address]    Asthma    Hypertension    Cardiac arrhythmia    Encounter for loop recorder check    Coronary artery disease involving native coronary artery of native heart without angina pectoris    Goiter    Primary osteoarthritis of both knees    Splenic infarct    Obesity, Class I, BMI 30-34.9    Chronic combined systolic (congestive) and diastolic (congestive) heart failure (HCC)    Thoracic degenerative disc disease    Persistent atrial fibrillation (HCC)    S/P MVR (mitral valve repair)    Ischemic cardiomyopathy    Occlusion and stenosis of bilateral carotid arteries    Pneumatosis intestinalis    Tachycardia       Outpatient Medications Marked as Taking for the 10/23/20 encounter (Office Visit) with Keith Aviles MD   Medication Sig Dispense Refill    potassium chloride (KLOR-CON M) 10 MEQ extended release tablet TAKE 1 TABLET DAILY 90 tablet 1    torsemide (DEMADEX) 100 MG tablet Take 1 tablet by mouth daily TAKE 1 TABLET DAILY 90 tablet 1    carvedilol (COREG) 12.5 MG tablet Take 1 tablet by mouth 2 times daily TAKE 1 TABLET TWICE A DAY WITH MEALS 180 tablet 1    blood glucose test strips (FREESTYLE LITE) strip USE TO TEST FOUR TIMES A  strip 4    insulin glargine (LANTUS SOLOSTAR) 100 UNIT/ML injection pen INJECT 50 UNITS IN THE MORNING AND 24 UNITS AT BEDTIME (Patient taking differently: every morning 30 units in the morning and PRN at night if needed) 90 mL 1    albuterol (PROVENTIL) (2.5 MG/3ML) 0.083% nebulizer solution Take 3 mLs by nebulization every 6 hours as needed for Wheezing 120 each 3    methocarbamol (ROBAXIN-750) 750 MG tablet Take 1 tablet by mouth 2 times daily (Patient taking differently: Take 750 mg by mouth as needed ) 60 tablet 1    exenatide (BYETTA 10 MCG PEN) 10 MCG/0.04ML injection Inject 0.04 mLs into the skin daily (Patient taking differently: Inject 10 mcg into the skin 2 times daily (with meals) ) 3 pen 3    spironolactone (ALDACTONE) 25 MG tablet TAKE 1 TABLET DAILY 90 tablet 3    polyethylene glycol (GLYCOLAX) 17 GM/SCOOP powder Take 17 g by mouth daily      omeprazole (PRILOSEC) 20 MG delayed release capsule Take 20 mg by mouth daily      FreeStyle Lancets MISC 1 each by Does not apply route 4 times daily 200 each 5    ondansetron (ZOFRAN) 4 MG tablet Take 1 tablet by mouth 3 times daily as needed for Nausea or Vomiting 30 tablet 0    albuterol sulfate HFA (PROAIR HFA) 108 (90 Base) MCG/ACT inhaler Inhale 2 puffs into the lungs every 6 hours as needed for Wheezing 3 Inhaler 0    warfarin (COUMADIN) 5 MG tablet TAKE 1 TABLET DAILY (Patient taking differently: Mon, Fri 2.5mg and 5mg all other days) 100 tablet 4    montelukast (SINGULAIR) 10 MG tablet TAKE 1 TABLET NIGHTLY 90 tablet 4    Blood Glucose Monitoring Suppl (EMBRACE PRO GLUCOSE METER) GAETANO 1 Device by Does not apply route 4 times daily 1 Device 0    HUMALOG KWIKPEN 100 UNIT/ML SOPN TAKE AS INSTRUCTED BY YOUR PRESCRIBER (Patient taking differently:  Only if high blood sugar-has not been using) 15 mL 8    nystatin (MYCOSTATIN) 820636 UNIT/ML suspension TAKE ONE TEASPOONFUL BY MOUTH FOUR TIMES A DAY FOR 5 DAYS 1 Bottle 2    aspirin 81 MG chewable tablet Take 1 tablet by mouth daily 30 tablet 3    BD PEN NEEDLE JANNET U/F 32G X 4 MM MISC USE 1 PEN NEEDLE FOUR TIMES A  each 3    vitamin B-12 500 MCG tablet Take 1 tablet by mouth daily 30 tablet 3    Blood Glucose Monitoring Suppl (FREESTYLE LITE) GAETANO 1 Device by Does not apply route 4 times daily Patient to check blood sugar 4 times a day and PRN, she is treated with multiple daily injections of insulin that require correction dosing ;A1C 8.1 ; ICD code-E11.65 ,Z79.4 (Patient taking differently: 1 Device by Does not apply route 2 times daily ) 1 Device 0       Allergies   Allergen Reactions    Czzdbvbx-Qrbrlyz-Atahfp [Fluocinolone] Shortness Of Breath    Ciprofloxacin Findings: No rash. Neurological:      Mental Status: She is alert and oriented to person, place, and time. Sensory: Sensation is intact. Motor: Motor function is intact. Coordination: Coordination is intact. Deep Tendon Reflexes: Reflexes are normal and symmetric. Reflex Scores:       Tricep reflexes are 2+ on the right side and 2+ on the left side. Bicep reflexes are 2+ on the right side and 2+ on the left side. Comments: 12 point monofilament test normal    Psychiatric:         Behavior: Behavior is cooperative. Assessment:      [address] was seen today for follow-up and diabetes. Diagnoses and all orders for this visit:    Type 2 diabetes mellitus with hyperglycemia, with long-term current use of insulin ([facility] [address]  -      DIABETES FOOT EXAM    Cervical radiculopathy  -     Nathalie Wayne MD, Physical Medicine and Rehabilitation, [facility] - [facility]    Persistent atrial fibrillation [facility])    Chronic combined systolic (congestive) and diastolic (congestive) heart failure (HCC)    Aortic valve stenosis, etiology of cardiac valve disease unspecified            Plan:      Laboratory profile reviewed with patient and . Recommended they maintain the Coumadin to 5 mg daily rather to decrease the dosage down    Patient did have a CT scan of the neck but this was more arterial.  Will refer to Dr. Ivon Ribeiro for evaluation but did tell them that the CT scan of the neck may be needed to be done so that hopefully she can proceed with a epidural injection. In the meantime started on neck stretching range of motion exercises. Clinically she is back in atrial fibrillation but a good control rate and maintain current treatment plan. Agreed with the aortic valve stenosis that she will need further cardiac intervention    Maintain current diabetic management    RTC 2 months    Please note that this chart was generated using Dragon dictation software.  Although every

## 2020-10-23 NOTE — PATIENT INSTRUCTIONS
Patient Education        Neck: Exercises  Introduction  Here are some examples of exercises for you to try. The exercises may be suggested for a condition or for rehabilitation. Start each exercise slowly. Ease off the exercises if you start to have pain. You will be told when to start these exercises and which ones will work best for you. How to do the exercises  Neck stretch   1. This stretch works best if you keep your shoulder down as you lean away from it. To help you remember to do this, start by relaxing your shoulders and lightly holding on to your thighs or your chair. 2. Tilt your head toward your shoulder and hold for 15 to 30 seconds. Let the weight of your head stretch your muscles. 3. If you would like a little added stretch, use your hand to gently and steadily pull your head toward your shoulder. For example, keeping your right shoulder down, lean your head to the left. 4. Repeat 2 to 4 times toward each shoulder. Diagonal neck stretch   1. Turn your head slightly toward the direction you will be stretching, and tilt your head diagonally toward your chest and hold for 15 to 30 seconds. 2. If you would like a little added stretch, use your hand to gently and steadily pull your head forward on the diagonal.  3. Repeat 2 to 4 times toward each side. Dorsal glide stretch   The dorsal glide stretches the back of the neck. If you feel pain, do not glide so far back. Some people find this exercise easier to do while lying on their backs with an ice pack on the neck. 1. Sit or stand tall and look straight ahead. 2. Slowly tuck your chin as you glide your head backward over your body  3. Hold for a count of 6, and then relax for up to 10 seconds. 4. Repeat 8 to 12 times. Chest and shoulder stretch   1. Sit or stand tall and glide your head backward as in the dorsal glide stretch. 2. Raise both arms so that your hands are next to your ears.   3. Take a deep breath, and as you breathe out, lower your elbows down and behind your back. You will feel your shoulder blades slide down and together, and at the same time you will feel a stretch across your chest and the front of your shoulders. 4. Hold for about 6 seconds, and then relax for up to 10 seconds. 5. Repeat 8 to 12 times. Strengthening: Hands on head   1. Move your head backward, forward, and side to side against gentle pressure from your hands, holding each position for about 6 seconds. 2. Repeat 8 to 12 times. Follow-up care is a key part of your treatment and safety. Be sure to make and go to all appointments, and call your doctor if you are having problems. It's also a good idea to know your test results and keep a list of the medicines you take. Where can you learn more? Go to [email or web address] org and sign in to your [employer] account. Enter P975 in the [facility] [employer] box to learn more about \"Neck: Exercises. \"     If you do not have an account, please click on the \"Sign Up Now\" link. Current as of: March 2, 2020               Content Version: 12.6  © 4350-7559 [employer]. Care instructions adapted under license by [facility] ([facility]). If you have questions about a medical condition or this instruction, always ask your healthcare professional. [address] any warranty or liability for your use of this information. Patient Education        Neck Arthritis: Exercises  Introduction  Here are some examples of exercises for you to try. The exercises may be suggested for a condition or for rehabilitation. Start each exercise slowly. Ease off the exercises if you start to have pain. You will be told when to start these exercises and which ones will work best for you. How to do the exercises  Neck stretches to the side   1. This stretch works best if you keep your shoulder down as you lean away from it.  To help you remember to do this, start by relaxing your shoulders and lightly holding on to your thighs or your chair. 2. Tilt your head toward your shoulder and hold for 15 to 30 seconds. Let the weight of your head stretch your muscles. 3. Repeat 2 to 4 times toward each shoulder. Chin tuck   1. Lie on the floor with a rolled-up towel under your neck. Your head should be touching the floor. 2. Slowly bring your chin toward your chest.  3. Hold for a count of 6, and then relax for up to 10 seconds. 4. Repeat 8 to 12 times. Active cervical rotation   1. Sit in a firm chair, or stand up straight. 2. Keeping your chin level, turn your head to the right, and hold for 15 to 30 seconds. 3. Turn your head to the left and hold for 15 to 30 seconds. 4. Repeat 2 to 4 times to each side. Shoulder blade squeeze   1. While standing, squeeze your shoulder blades together. 2. Do not raise your shoulders up as you are squeezing. 3. Hold for 6 seconds. 4. Repeat 8 to 12 times. Shoulder rolls   1. Sit comfortably with your feet shoulder-width apart. You can also do this exercise standing up. 2. Roll your shoulders up, then back, and then down in a smooth, circular motion. 3. Repeat 2 to 4 times. Follow-up care is a key part of your treatment and safety. Be sure to make and go to all appointments, and call your doctor if you are having problems. It's also a good idea to know your test results and keep a list of the medicines you take. Where can you learn more? Go to [email or web address] [email or web address].[email or web address] org and sign in to your [facility] account. Enter B386 in the [facility] box to learn more about \"Neck Arthritis: Exercises. \"     If you do not have an account, please click on the \"Sign Up Now\" link. Current as of: March 2, 2020               Content Version: 12.6  © 0763-4847 [employer]. Care instructions adapted under license by [facility] ([facility]).  If you have questions about a medical condition or this instruction, always ask your healthcare professional. [address] any warranty or liability for your use of this information.

## 2020-10-26 ENCOUNTER — NURSE ONLY (OUTPATIENT)
Dept: CARDIOLOGY CLINIC | Age: 75
End: 2020-10-26
Payer: MEDICARE

## 2020-10-26 PROCEDURE — 93298 REM INTERROG DEV EVAL SCRMS: CPT | Performed by: INTERNAL MEDICINE

## 2020-10-26 PROCEDURE — G2066 INTER DEVC REMOTE 30D: HCPCS | Performed by: INTERNAL MEDICINE

## 2020-10-29 LAB — INR BLD: 2.2

## 2020-10-30 ENCOUNTER — TELEPHONE (OUTPATIENT)
Dept: FAMILY MEDICINE CLINIC | Age: 75
End: 2020-10-30

## 2020-10-30 ENCOUNTER — ANTI-COAG VISIT (OUTPATIENT)
Dept: FAMILY MEDICINE CLINIC | Age: 75
End: 2020-10-30

## 2020-10-30 NOTE — TELEPHONE ENCOUNTER
----- Message from Jazmin Mahajan sent at 10/30/2020  5:10 PM EDT -----  Subject: Results Request    QUESTIONS  Which lab or imaging result is the patient calling about? inr  Which provider ordered the test? Curtis Medeiros   At what location was the test performed? Date the test was performed? 2020-10-29  Additional Information for Provider? Patient stated that someone from the   office just called but they missed they call and figured it was the   results from the test.   ---------------------------------------------------------------------------  --------------  [address]  What is the best way for the office to contact you? OK to leave message on   voicemail  Preferred Call Back Phone Number?  [phone]

## 2020-11-03 PROBLEM — I35.0 NONRHEUMATIC AORTIC VALVE STENOSIS: Status: ACTIVE | Noted: 2020-11-03

## 2020-11-03 NOTE — PROGRESS NOTES
[address]    H+P // CONSULT // OUTPATIENT VISIT // FOLLOWUP VISIT     Referring Doctor Curtis Medeiros MD   Encounter Type Followup     CHIEF COMPLAINT     Visit Type Chronic   Symptoms SOB   Problems AS, MR s/p MVR, CAD s/p CABG, pAFIB, HTN, CHOL, DVT     HISTORY OF PRESENT ILLNESS      GEN - Doing fair. SOB with any exertion. No cp.  AS - Mild over last few years. Concern for low output low gradient.  CAD - Denies cp, sob, dizziness, syncope, palpitations.  AFIB - followed by EP   HTN - Ambulatory BP readings in good range. No HA or dizziness.  CHOL - Last cholesterol reviewed and in good range. No statin due to intolerance?  MED - Compliant with CV meds listed below without notable side effects. HISTORY/ALLERGIES/ROS     MedHx:  has a past medical history of Asthma, Atrial fibrillation ([facility] [address] Eosinophilia, Hemoptysis, HIGH CHOLESTEROL, Hx of blood clots, Hypertension, Irregular heart beat, Other specified gastritis without mention of hemorrhage, Palpitations, Skin cancer, and Type II or unspecified type diabetes mellitus without mention of complication, not stated as uncontrolled. SurgHx:  has a past surgical history that includes Cholecystectomy;  section; Colonoscopy (2017); skin biopsy; bronchoscopy (2016); Coronary artery bypass graft (2018); Mitral valve replacement (2018); transesophageal echocardiogram (2018); Tunneled venous catheter placement (Left, 2018); Cardiac catheterization (2018); Insertable Cardiac Monitor (Left, 2018); Colonoscopy (2014); Hysterectomy; Upper gastrointestinal endoscopy (N/A, 10/10/2018); Colonoscopy (10/10/2018); and Colonoscopy (N/A, 2020). SocHx:  reports that she has never smoked. She has never used smokeless tobacco. She reports that she does not drink alcohol or use drugs.    FamHx: family history includes Asthma in her mother; Cancer in her father; Heart Disease in her mother; High Blood Pressure in her mother; Hypertension in her mother. Allerg:Qbuumnvy-fvqklwu-pmfvbh [fluocinolone]; Ciprofloxacin; Diovan [valsartan]; Flagyl [metronidazole]; Metformin and related [metformin and related];  Benazepril; Morphine; Saxagliptin; and Levaquin [levofloxacin]   ROS: [x]Full ROS obtained and negative except as mentioned in HPI     MEDICATIONS      Current Outpatient Medications   Medication Sig Dispense Refill    potassium chloride (KLOR-CON M) 10 MEQ extended release tablet TAKE 1 TABLET DAILY 90 tablet 1    torsemide (DEMADEX) 100 MG tablet Take 1 tablet by mouth daily TAKE 1 TABLET DAILY 90 tablet 1    carvedilol (COREG) 12.5 MG tablet Take 1 tablet by mouth 2 times daily TAKE 1 TABLET TWICE A DAY WITH MEALS 180 tablet 1    blood glucose test strips (FREESTYLE LITE) strip USE TO TEST FOUR TIMES A  strip 4    insulin glargine (LANTUS SOLOSTAR) 100 UNIT/ML injection pen INJECT 50 UNITS IN THE MORNING AND 24 UNITS AT BEDTIME (Patient taking differently: every morning 30 units in the morning and PRN at night if needed) 90 mL 1    albuterol (PROVENTIL) (2.5 MG/3ML) 0.083% nebulizer solution Take 3 mLs by nebulization every 6 hours as needed for Wheezing 120 each 3    methocarbamol (ROBAXIN-750) 750 MG tablet Take 1 tablet by mouth 2 times daily (Patient taking differently: Take 750 mg by mouth as needed ) 60 tablet 1    exenatide (BYETTA 10 MCG PEN) 10 MCG/0.04ML injection Inject 0.04 mLs into the skin daily (Patient taking differently: Inject 10 mcg into the skin 2 times daily (with meals) ) 3 pen 3    spironolactone (ALDACTONE) 25 MG tablet TAKE 1 TABLET DAILY 90 tablet 3    polyethylene glycol (GLYCOLAX) 17 GM/SCOOP powder Take 17 g by mouth daily      omeprazole (PRILOSEC) 20 MG delayed release capsule Take 20 mg by mouth daily      FreeStyle Lancets MISC 1 each by Does not apply route 4 times daily 200 each 5    ondansetron (ZOFRAN) 4 MG tablet Take 1 tablet

## 2020-11-05 ENCOUNTER — ANTI-COAG VISIT (OUTPATIENT)
Dept: FAMILY MEDICINE CLINIC | Age: 75
End: 2020-11-05

## 2020-11-05 LAB — INR BLD: 1.9

## 2020-11-06 ENCOUNTER — OFFICE VISIT (OUTPATIENT)
Dept: ORTHOPEDIC SURGERY | Age: 75
End: 2020-11-06
Payer: MEDICARE

## 2020-11-06 VITALS — BODY MASS INDEX: 30.77 KG/M2 | WEIGHT: 203.04 LBS | HEIGHT: 68 IN | RESPIRATION RATE: 14 BRPM | TEMPERATURE: 97 F

## 2020-11-06 PROCEDURE — 99203 OFFICE O/P NEW LOW 30 MIN: CPT | Performed by: PHYSICAL MEDICINE & REHABILITATION

## 2020-11-06 RX ORDER — GABAPENTIN 300 MG/1
300 CAPSULE ORAL NIGHTLY
Qty: 30 CAPSULE | Refills: 0 | Status: SHIPPED | OUTPATIENT
Start: 2020-11-06 | End: 2020-12-14 | Stop reason: ALTCHOICE

## 2020-11-06 NOTE — PROGRESS NOTES
New Patient: SPINE    Referring Provider:  No ref. provider found    Chief Complaint   Patient presents with    Neck Pain     NP CSP - chronic pain. (-) AMBROCIO. c/o constant. radiates into posterior aspect of head. wakes her up intermittently. tx hx: ice/heat/Tylenol/Robaxin. HISTORY OF PRESENT ILLNESS:      · The patient is being sent at the request of No ref. provider found in consultation as a new spine patient for neck pain The patient is a 69 y.o. female whom reports neck pain with radiation down the back and to the occiput of the head. The patient states the pain has worsened over the past few months. The patient reports she has been to the hospital a few times for the pain. The patient rates her neck pain 10/10 and upper back pain 10/10. She states the radiating pain sometimes causes pain around her ribs as well. Aggravating factors include everything including sitting, lying down, changing positions and standing for long periods of time. Alleviating factors include oral pain medication and Tylenol. Recent treatment includes oral pain medication prescribed by Dr. Eliana Mccarthy and oral steroids. The patient states the Tylenol does not help. Dr. Eliana Mccarthy prescribed a home exercise program which did not help. The patient cannot take anti inflammatory medication due to being on Warfarin. The patient states she cannot undergo an MRI without sedation.      Pain Assessment  Location of Pain: Neck  Location Modifiers: Posterior(HEAD)  Severity of Pain: 6  Quality of Pain: Sharp(BURNING)  Duration of Pain: Persistent  Frequency of Pain: Constant  Date Pain First Started: (<5 YRS)  Aggravating Factors: (ANY ACTIVITY)  Limiting Behavior: Yes  Result of Injury: No  Work-Related Injury: No  Are there other pain locations you wish to document?: No      Associated signs and symptoms:   Neurogenic bowel or bladder symptoms:  no   Perceived weakness:  no   Difficulty walking:  no    Recent Imaging (within past one year)   Xrays: no   MRI or CT of spine: no    Current/Past Treatment:   · Physical Therapy:  HEP  · Chiropractic:  none  · Injection:  none  · Medications:   NSAIDS:  none   Muscle relaxer:  yes   Steriods:  yes   Neuropathic medications:  none   Opioids:  Hydrocodone  · Previous surgery:  no  · Previous surgical consult:  no  · Other:  · Infection control  · Tested positive for MRSA in past 12 months:  no  · Tested positive for MSSA \"staph infection\" in past 12 months: no  · Tested positive for VRE (Vancomycin Resistant Enterococci) in past 12 months:   no  · Currently on any antibiotics for an infection: no  · Anticoagulants:  · On a blood thinner:  Yes, Warfarin   · Any history of bleeding disorder: no   · MRI Contraindication: no   · Previous Pain Management: no   · Goal for treatment Pain reduction  · How long can you stand? Sit? Walk? -       Past medical, surgical, social and family history reviewed with the patient.  No pertinent relevant history            Past Medical History:   Past Medical History:   Diagnosis Date    Asthma     Atrial fibrillation (HCC)     Eosinophilia     Hemoptysis     HIGH CHOLESTEROL     Hx of blood clots     Hypertension     Irregular heart beat     Other specified gastritis without mention of hemorrhage     Palpitations     Skin cancer     basal and squamous    Type II or unspecified type diabetes mellitus without mention of complication, not stated as uncontrolled       Past Surgical History:     Past Surgical History:   Procedure Laterality Date    BRONCHOSCOPY  07/18/2016    Dr. Deric Mayorga - brushings from [address]  08/16/2018    Dr. Ricardo Garcia  02/07/2017    Dr. Gosia Zhou - sigmoid diverticulosis, polypectomies x3    COLONOSCOPY  01/17/2014    Dr. Gosia Zhou - sigmoid diverticulosis, polypectomies x3, internal hemorrhoids    COLONOSCOPY  10/10/2018    w/biopsy performed by Barb Vogel MD at [facility] COLONOSCOPY N/A 8/7/2020    COLONOSCOPY DIAGNOSTIC performed by Ramon Pérez MD at [address] GRAFT  08/21/2018    Dr. Tanner Torres - x3 (LIMA-LAD, L SV-D1-PLV) modified BL MAZE procedure w/obliteration of WAYNE using 45mm AtriClip    HYSTERECTOMY      INSERTABLE CARDIAC MONITOR Left 08/16/2018    Dr. Carolyn Marquez Roberturbano Frey # [id number] Medtr[employer]    MITRAL VALVE REPLACEMENT  08/21/2018    Dr. Tanner Torres - 27mm Medtronic Cinch tissue valve    SKIN BIOPSY      TRANSESOPHAGEAL ECHOCARDIOGRAM  08/21/2018    during CABG/MVR    TUNNELED VENOUS CATHETER PLACEMENT Left 08/23/2018    Dr. Netta Mark for HD---since removed    UPPER GASTROINTESTINAL ENDOSCOPY N/A 10/10/2018    w/biopsy performed by Rama Wilkinson MD at [facility]     Current Medications:     Current Outpatient Medications:     gabapentin (NEURONTIN) 300 MG capsule, Take 1 capsule by mouth nightly for 30 days.  Intended supply: 30 days, Disp: 30 capsule, Rfl: 0    potassium chloride (KLOR-CON M) 10 MEQ extended release tablet, TAKE 1 TABLET DAILY, Disp: 90 tablet, Rfl: 1    torsemide (DEMADEX) 100 MG tablet, Take 1 tablet by mouth daily TAKE 1 TABLET DAILY, Disp: 90 tablet, Rfl: 1    carvedilol (COREG) 12.5 MG tablet, Take 1 tablet by mouth 2 times daily TAKE 1 TABLET TWICE A DAY WITH MEALS, Disp: 180 tablet, Rfl: 1    blood glucose test strips (FREESTYLE LITE) strip, USE TO TEST FOUR TIMES A DAY, Disp: 100 strip, Rfl: 4    insulin glargine (LANTUS SOLOSTAR) 100 UNIT/ML injection pen, INJECT 50 UNITS IN THE MORNING AND 24 UNITS AT BEDTIME (Patient taking differently: every morning 30 units in the morning and PRN at night if needed), Disp: 90 mL, Rfl: 1    albuterol (PROVENTIL) (2.5 MG/3ML) 0.083% nebulizer solution, Take 3 mLs by nebulization every 6 hours as needed for Wheezing, Disp: 120 each, Rfl: 3    methocarbamol (ROBAXIN-750) 750 MG tablet, Take 1 tablet by mouth 2 times daily (Patient taking differently: Take 750 mg by mouth as needed ), Disp: 60 tablet, Rfl: 1    exenatide (BYETTA 10 MCG PEN) 10 MCG/0.04ML injection, Inject 0.04 mLs into the skin daily (Patient taking differently: Inject 10 mcg into the skin 2 times daily (with meals) ), Disp: 3 pen, Rfl: 3    spironolactone (ALDACTONE) 25 MG tablet, TAKE 1 TABLET DAILY, Disp: 90 tablet, Rfl: 3    polyethylene glycol (GLYCOLAX) 17 GM/SCOOP powder, Take 17 g by mouth daily, Disp: , Rfl:     omeprazole (PRILOSEC) 20 MG delayed release capsule, Take 20 mg by mouth daily, Disp: , Rfl:     FreeStyle Lancets MISC, 1 each by Does not apply route 4 times daily, Disp: 200 each, Rfl: 5    ondansetron (ZOFRAN) 4 MG tablet, Take 1 tablet by mouth 3 times daily as needed for Nausea or Vomiting, Disp: 30 tablet, Rfl: 0    albuterol sulfate HFA (PROAIR HFA) 108 (90 Base) MCG/ACT inhaler, Inhale 2 puffs into the lungs every 6 hours as needed for Wheezing, Disp: 3 Inhaler, Rfl: 0    warfarin (COUMADIN) 5 MG tablet, TAKE 1 TABLET DAILY (Patient taking differently: Mon, Fri 2.5mg and 5mg all other days), Disp: 100 tablet, Rfl: 4    montelukast (SINGULAIR) 10 MG tablet, TAKE 1 TABLET NIGHTLY, Disp: 90 tablet, Rfl: 4    Blood Glucose Monitoring Suppl (EMBRACE PRO GLUCOSE METER) GAETANO, 1 Device by Does not apply route 4 times daily, Disp: 1 Device, Rfl: 0    HUMALOG KWIKPEN 100 UNIT/ML SOPN, TAKE AS INSTRUCTED BY YOUR PRESCRIBER (Patient taking differently:  Only if high blood sugar-has not been using), Disp: 15 mL, Rfl: 8    nystatin (MYCOSTATIN) 237701 UNIT/ML suspension, TAKE ONE TEASPOONFUL BY MOUTH FOUR TIMES A DAY FOR 5 DAYS, Disp: 1 Bottle, Rfl: 2    aspirin 81 MG chewable tablet, Take 1 tablet by mouth daily, Disp: 30 tablet, Rfl: 3    BD PEN NEEDLE JANNET U/F 32G X 4 MM MISC, USE 1 PEN NEEDLE FOUR TIMES A DAY, Disp: 360 each, Rfl: 3    vitamin B-12 500 MCG tablet, Take 1 tablet by mouth daily, Disp: 30 tablet, Rfl: 3    Blood Glucose Monitoring Suppl (FREESTYLE LITE) GAETANO, 1 Device by Does not apply route 4 times daily Patient to check blood sugar 4 times a day and PRN, she is treated with multiple daily injections of insulin that require correction dosing ;A1C 8.1 ; ICD code-E11.65 ,Z79.4 (Patient taking differently: 1 Device by Does not apply route 2 times daily ), Disp: 1 Device, Rfl: 0  Allergies:  Pqgrkdgm-qchnghc-mmqvie [fluocinolone]; Ciprofloxacin; Diovan [valsartan]; Flagyl [metronidazole]; Metformin and related [metformin and related]; Benazepril; Morphine; Saxagliptin; and Levaquin [levofloxacin]  Social History:    reports that she has never smoked. She has never used smokeless tobacco. She reports that she does not drink alcohol or use drugs. Family History:   Family History   Problem Relation Age of Onset   Howard Cancer Father     Asthma Mother     Hypertension Mother     Heart Disease Mother     High Blood Pressure Mother        REVIEW OF SYSTEMS: ROS - 14 point    Constitutional: No fevers, chills, night sweats, unexplained weight loss  Eye: No vision changes or diplopia  ENT: No nasal congestion, postnasal drip or sore throat. No tinnitus  Respiratory: No cough or SOB  CV: No chest pain or palpitations  GI: No nausea, abdominal pain, stool changes  : No dysuria or hematuria  Skin: No new or changing skin lesions, no rashes  MSK: No joint swelling, morning stiffness, unusual joint pain  Neurological: No headache, confusion, syncope  Psychiatric: No excessive anxiety or depression  Endocrine: No polyuria or polydipsia  Hematologic: No lymph node enlargement or excessive bleeding  Immunologic:No history of immune deficiency or immunomodulating drugs           PHYSICAL EXAM:    Vitals: Temperature 97 °F (36.1 °C), resp. rate 14, height 5' 7.99\" (1.727 m), weight 203 lb 0.7 oz (92.1 kg), not currently breastfeeding.     GENERAL EXAM:  · General Apparence: Patient is adequately groomed with no evidence of malnutrition. · Psychiatric: Orientation: The patient is oriented to time, place and person. The patient's mood and affect are appropriate   · Vascular: Examination reveals no swelling and palpation reveals no tenderness in upper or lower extremities. Good capillary refill. · The lymphatic examination of the neck, axillae and groin reveals all areas to be without enlargement or induration   Sensation is intact without deficit in the upper and lower extremities to light touch and pinprick  · Coordination of the upper and lower extremities are normal.    CERVICAL EXAMINATION:  · Inspection: Local inspection shows no step-off or bruising. Cervical alignment is normal. No instability is noted. · Palpation and Percussion: No evidence of tenderness at the midline. Paraspinal tenderness is not present. There is no paraspinal spasm. · Range of Motion:  limited by 25% in all planes due to pain   · Strength: 5/5 bilateral upper extremities  · Special Tests:   Spurling's positive right and Hampton's are negative bilaterally. Sharp and Impingement tests are negative bilaterally. · Skin:There are no rashes, ulcerations or lesions. · Reflexes: Bilaterally triceps, biceps and brachioradialis are 1+. Clonus absent bilaterally at the feet. No pathological reflexes are noted. · Gait & station:  normal, patient ambulates without assistance and no ataxia  · Additional Examinations:  · RIGHT UPPER EXTREMITY:  Inspection/examination of the right upper extremity does not show any tenderness, deformity or injury. Range of motion is normal and pain-free. There is no gross instability. There are no rashes, ulcerations or lesions. Strength and tone are normal. No atrophy or abnormal movements are noted. · LEFT UPPER EXTREMITY: Inspection/examination of the left upper extremity does not show any tenderness, deformity or injury. Range of motion is normal and pain-free. There is no gross instability.   There are no rashes, Result Value Ref Range    INR 1.90          Impression (Medical Decision Making):       1. Cervical spondylosis without myelopathy    2. Pain of cervical spine    3. Cervical radiculopathy    4. DDD (degenerative disc disease), cervical        Plan (Medical Decision Making):    I discussed the diagnosis and the treatment options with Ofelia Tavarez today. In Summary:  The various treatment options were outlined and discussed with Ofelia Tavarez including:  Conservative care options: physical therapy, ice, medications, bracing, and activity modification. The indications for therapeutic injections. The indications for additional imaging/laboratory studies. The indications for (possible future) interventions. After considering the various options discussed, Ofelia Tavarez elected to pursue a course of treatment that includes the followin. Medications: I will add a Gabapentin 300 mg PO nightly to the current regimen. Counseled on risks, benefits and alternatives and recommended not to take the medicine and drive or operate heavy machinery. 2. PT:  Encouraged to continue with Home exercise program.    3. Further studies: Setup for Cervical CT without contrast to evaluate for soft tissue pathology or stenosis contributing to the neck pain and paresthesia. She refuses a MRI due to severe claustrophobia. 4. Interventional:  After further imaging is obtained, interventional options will be reviewed and recommended.     5. Healthy Lifestyle Measures:  Patient education material reviewing the following was distributed to Ofelia Tavarez  Anatomic drawings  Healthy lifestyle education  Osteoporosis prevention,   Back and neck pain educational information   Advanced imaging preparedness    Posture education   Proper lifting and carrying techniques,   Weight management  Quitting smoking and   Minor ways to treat back pain  For further information regarding the spine conditions and to review interventional treatments the patient was directed to [employer].    6.  Follow up:  1-2 weeks    Jose A Lang was instructed to call the office if her symptoms worsen or if new symptoms appear prior to the next scheduled visit. She is specifically instructed to contact the office between now & her scheduled appointment if she has concerns related to her condition or if she needs assistance in scheduling the above tests. She is welcome to call for an appointment sooner if she has any additional concerns or questions. Jodie Harris. Lynda Martins MD, ADONAY, [facility]  Board Certified in [address] [place] [address] Certified and Fellowship Trained in [facility] ([facility])     This dictation was performed with a verbal recognition program [facility]) and it was checked for errors. It is possible that there are still dictated errors within this office note. If so, please bring any errors to my attention for an addendum. All efforts were made to ensure that this office note is accurate.

## 2020-11-10 ENCOUNTER — TELEPHONE (OUTPATIENT)
Dept: ORTHOPEDIC SURGERY | Age: 75
End: 2020-11-10

## 2020-11-10 NOTE — TELEPHONE ENCOUNTER
S/w patient regarding CT CSP approval and authorization being valid until 05/09/2021. Patient was instructed to call [facility] to schedule CT CSP, then contact our office for follow up appointment. CT CSP results will not be given over the phone or via [employer]. Patient currently has a follow up appointment schedule for 11/20/20 @ [facility]. Advised to contact the office if needing to reschedule this to accommodate MRI scan. Patient voiced understanding of MRI results not being given over the phone.

## 2020-11-12 ENCOUNTER — OFFICE VISIT (OUTPATIENT)
Dept: CARDIOLOGY CLINIC | Age: 75
End: 2020-11-12
Payer: MEDICARE

## 2020-11-12 ENCOUNTER — HOSPITAL ENCOUNTER (OUTPATIENT)
Dept: CT IMAGING | Age: 75
Discharge: HOME | End: 2020-11-12
Payer: MEDICARE

## 2020-11-12 VITALS
DIASTOLIC BLOOD PRESSURE: 72 MMHG | BODY MASS INDEX: 30.77 KG/M2 | SYSTOLIC BLOOD PRESSURE: 124 MMHG | HEIGHT: 68 IN | RESPIRATION RATE: 22 BRPM | OXYGEN SATURATION: 68 % | WEIGHT: 203 LBS

## 2020-11-12 PROCEDURE — 72125 CT NECK SPINE W/O DYE: CPT

## 2020-11-12 PROCEDURE — 99214 OFFICE O/P EST MOD 30 MIN: CPT | Performed by: INTERNAL MEDICINE

## 2020-11-12 RX ORDER — CARVEDILOL 25 MG/1
25 TABLET ORAL 2 TIMES DAILY
Qty: 270 TABLET | Refills: 3 | Status: ON HOLD | OUTPATIENT
Start: 2020-11-12 | End: 2021-03-10 | Stop reason: HOSPADM

## 2020-11-12 NOTE — LETTER
[address] [place] [address] [address]  Phone: [phone]  Fax: [phone]    Julianne Mcneil MD        2020     Alex San, [address]    Patient: Elba Leija  MR Number: [id number]  YOB: 1945  Date of Visit: 2020    Dear Dr. Mariia Carreon    H+P // CONSULT // OUTPATIENT VISIT // FOLLOWUP VISIT     Referring Doctor Alex San MD   Encounter Type Followup     CHIEF COMPLAINT     Visit Type Chronic   Symptoms SOB   Problems AS, MR s/p MVR, CAD s/p CABG, pAFIB, HTN, CHOL, DVT     HISTORY OF PRESENT ILLNESS     ? GEN - Doing fair. SOB with any exertion. No cp.    ? AS - Mild over last few years. Concern for low output low gradient. ? CAD - Denies cp, sob, dizziness, syncope, palpitations. ? AFIB - followed by EP  ? HTN - Ambulatory BP readings in good range. No HA or dizziness. ? CHOL - Last cholesterol reviewed and in good range. No statin due to intolerance? ? MED - Compliant with CV meds listed below without notable side effects. HISTORY/ALLERGIES/ROS     MedHx:  has a past medical history of Asthma, Atrial fibrillation ([address] Eosinophilia, Hemoptysis, HIGH CHOLESTEROL, Hx of blood clots, Hypertension, Irregular heart beat, Other specified gastritis without mention of hemorrhage, Palpitations, Skin cancer, and Type II or unspecified type diabetes mellitus without mention of complication, not stated as uncontrolled. SurgHx:  has a past surgical history that includes Cholecystectomy;  section; Colonoscopy (2017); skin biopsy; bronchoscopy (2016); Coronary artery bypass graft (2018); Mitral valve replacement (2018); transesophageal echocardiogram (2018); Tunneled venous catheter placement (Left, 2018); Cardiac catheterization (2018);  Insertable Cardiac Monitor (Left, 08/16/2018); Colonoscopy (01/17/2014); Hysterectomy; Upper gastrointestinal endoscopy (N/A, 10/10/2018); Colonoscopy (10/10/2018); and Colonoscopy (N/A, 8/7/2020). SocHx:  reports that she has never smoked. She has never used smokeless tobacco. She reports that she does not drink alcohol or use drugs. FamHx: family history includes Asthma in her mother; Cancer in her father; Heart Disease in her mother; High Blood Pressure in her mother; Hypertension in her mother. Allerg:Vmfjwhgv-lxathlx-iolgpl [fluocinolone]; Ciprofloxacin; Diovan [valsartan]; Flagyl [metronidazole]; Metformin and related [metformin and related];  Benazepril; Morphine; Saxagliptin; and Levaquin [levofloxacin]   ROS: [x]Full ROS obtained and negative except as mentioned in HPI     MEDICATIONS      Current Outpatient Medications   Medication Sig Dispense Refill    potassium chloride (KLOR-CON M) 10 MEQ extended release tablet TAKE 1 TABLET DAILY 90 tablet 1    torsemide (DEMADEX) 100 MG tablet Take 1 tablet by mouth daily TAKE 1 TABLET DAILY 90 tablet 1    carvedilol (COREG) 12.5 MG tablet Take 1 tablet by mouth 2 times daily TAKE 1 TABLET TWICE A DAY WITH MEALS 180 tablet 1    blood glucose test strips (FREESTYLE LITE) strip USE TO TEST FOUR TIMES A  strip 4    insulin glargine (LANTUS SOLOSTAR) 100 UNIT/ML injection pen INJECT 50 UNITS IN THE MORNING AND 24 UNITS AT BEDTIME (Patient taking differently: every morning 30 units in the morning and PRN at night if needed) 90 mL 1    albuterol (PROVENTIL) (2.5 MG/3ML) 0.083% nebulizer solution Take 3 mLs by nebulization every 6 hours as needed for Wheezing 120 each 3    methocarbamol (ROBAXIN-750) 750 MG tablet Take 1 tablet by mouth 2 times daily (Patient taking differently: Take 750 mg by mouth as needed ) 60 tablet 1    exenatide (BYETTA 10 MCG PEN) 10 MCG/0.04ML injection Inject 0.04 mLs into the skin daily (Patient taking differently: Inject 10 mcg into the skin 2 times daily (with meals) ) 3 pen 3    spironolactone (ALDACTONE) 25 MG tablet TAKE 1 TABLET DAILY 90 tablet 3    polyethylene glycol (GLYCOLAX) 17 GM/SCOOP powder Take 17 g by mouth daily      omeprazole (PRILOSEC) 20 MG delayed release capsule Take 20 mg by mouth daily      FreeStyle Lancets MISC 1 each by Does not apply route 4 times daily 200 each 5    ondansetron (ZOFRAN) 4 MG tablet Take 1 tablet by mouth 3 times daily as needed for Nausea or Vomiting 30 tablet 0    albuterol sulfate HFA (PROAIR HFA) 108 (90 Base) MCG/ACT inhaler Inhale 2 puffs into the lungs every 6 hours as needed for Wheezing 3 Inhaler 0    warfarin (COUMADIN) 5 MG tablet TAKE 1 TABLET DAILY (Patient taking differently: Mon, Fri 2.5mg and 5mg all other days) 100 tablet 4    montelukast (SINGULAIR) 10 MG tablet TAKE 1 TABLET NIGHTLY 90 tablet 4    Blood Glucose Monitoring Suppl (EMBRACE PRO GLUCOSE METER) GAETANO 1 Device by Does not apply route 4 times daily 1 Device 0    HUMALOG KWIKPEN 100 UNIT/ML SOPN TAKE AS INSTRUCTED BY YOUR PRESCRIBER (Patient taking differently: Only if high blood sugar-has not been using) 15 mL 8    nystatin (MYCOSTATIN) 035589 UNIT/ML suspension TAKE ONE TEASPOONFUL BY MOUTH FOUR TIMES A DAY FOR 5 DAYS 1 Bottle 2    aspirin 81 MG chewable tablet Take 1 tablet by mouth daily 30 tablet 3    BD PEN NEEDLE JANNET U/F 32G X 4 MM MISC USE 1 PEN NEEDLE FOUR TIMES A  each 3    vitamin B-12 500 MCG tablet Take 1 tablet by mouth daily 30 tablet 3    Blood Glucose Monitoring Suppl (FREESTYLE LITE) GAETANO 1 Device by Does not apply route 4 times daily Patient to check blood sugar 4 times a day and PRN, she is treated with multiple daily injections of insulin that require correction dosing ;A1C 8.1 ; ICD code-E11.65 ,Z79.4 (Patient taking differently: 1 Device by Does not apply route 2 times daily ) 1 Device 0     No current facility-administered medications for this visit. Reviewed with patient and will remain unchanged except as mentioned in A/P  PHYSICAL EXAM     Vitals:    11/12/20 0830   BP: 124/72   Resp: 22   SpO2: (!) 68%    Repeat sp O2  96% - above inaccurate  Gen Alert, coop, no distress Heart  Tachy, 2/6   Head NC, AT, no abnorm Abd  Soft, NT, +BS, no mass, no OM   Eyes PER, conj/corn clear Ext  Ext nl, AT, no C/C/E   Nose Nares nl, no drain, NT Pulse 2+ and symmetric   Throat Lips, mucosa, tongue nl Skin Col/text/turg nl, no vis rash/les   Neck S/S, TM, NT, no bruit/JVD Psych Nl mood and affect   Lung CTA-B, unlabored, no DTP Lymph   No cervical or axillary LA   Ch wall NT, no deform Neuro  Nl gross M/S exam     ASSESSMENT AND PLAN     *AS/MR   Date EF Detail   Sx   Sob, wall, pnd   Hx 8/18  MV replacement w/ 27 mm Medtronic Cinch tissue valve   NYHA   III   TTE 10/18  9/19  9/20 50%  50%  30% Bio MV MG 12, mild AS MG 12, mod TR  Bio MV MG 9, mild AS MG 13, mild to mod TR, apical akinesis  Bio MV MG 7, mild AS MG 16   JUDE 10/20 25% Bio MV MG 5, L atrial enlargement, a large stump of the LA appendage w/ no thrombus, AS, Mod TR   Plan   Dobutamine echo for AS  Refer to CHF  Increase coreg to 25bid  Consider entresto in future, explore benazepril allergy further prior   *CAD   Date EF Results   Sx   sob   Hx 8/18  CABGx3 LIMA-LAD, SVG-D1 and Posterior  LV branch of RCA Devonda Sniff)   [facility] 8/18  MVD -> CABG ([place])   Plan   Continue aggressive medical treatment at doses above  If AS mild or moderate by echo, consider recath for sob and depressed EF   *AFIB/Aflutter  Status Parox, 8/18 MAZE with WAYNE clip, 10/19 RFCA with PVI ablation, 1/20 CV, 10/20 CV, 8/18 LR implant   Plan Continue AC  *HTN  Status Controlled  Plan Counseled on diet/salt/exercise/weight, continue meds at doses above  *CHOL  Status  Controlled with last LDL of 71 (goal <70) and HDL of 33 (6/19)  Plan Counseled on diet/exercise/weight, continue statin, lipid/liver surveillance per PCP  *DVT/PE  Status On coumadin Plan Plan continue [facility]  *COMPLIANCE  Status Compliant  Plan Discussed importance of compliance with meds/diet/salt/exercise; avoid tob/alc/drugs; patient verbalized understanding  *FOLLOWUP  After testing    [address] am scribing for and in the presence of Joyce Reyes MD.   SignedKerline 11/03/20 3:18 PM   Provider Mp Carlton is working as a scribe for and in the presence of duran Reyes MD). Working as a scribe, Kerline Oneill may have prepopulated components of this note with my historical  intellectual property under my direct supervision. Any additions to this intellectual property were performed in my presence and at my direction. Furthermore, the content and accuracy of this note have been reviewed by me Joyce Reyes MD). 11/12/2020 7:36 AM        If you have questions, please do not hesitate to call me. I look forward to following Jamar Laureano along with you.     Sincerely,        Abigail Carlos MD

## 2020-11-16 NOTE — PROGRESS NOTES
[address]  Advanced CHF/Pulmonary Hypertension   Cardiac Evaluation      Maribel Umana  YOB: 1945    Date of Visit:  11/19/20    Chief Complaint   Patient presents with    New Patient    Shortness of Breath    Fatigue      History of Present Illness:  Maribel Umana is a 69 y.o. female who presents from referral from Dr. Zakia Delgado for consultation and management of worsening heart failure; JUDE on 10/21/20 showed EF 25%. ECHO 9/15/2020 with EF 25-30%. She saw Dr. Zakia Delgado 11/12/2020 c/o LEVIN; she sees him for AS. 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 She's had two DCCV's, ultimately unsuccessful. Today, she states she is having difficulty with breathing this morning, could not walk all the way into the office, felt better after sitting for awhile. She denies orthopnea/PND. She also denies chest pain, palpitations, light-headedness, edema. Her  is with her for the visit. Allergies   Allergen Reactions    [place]-Utciqxx-Lmrpqr [Fluocinolone] Shortness Of Breath    Ciprofloxacin Shortness Of Breath    Diovan [Valsartan] Shortness Of Breath    Flagyl [Metronidazole] Shortness Of Breath     Has taken diflucan at home 12/7/15    Metformin And Related [Metformin And Related] Shortness Of Breath    Benazepril      Other reaction(s): Not Recorded    Morphine      Bad reaction. \"makes her feel horrible\".  Saxagliptin      Other reaction(s): Not Recorded    Levaquin [Levofloxacin] Rash     Current Outpatient Medications   Medication Sig Dispense Refill    albuterol sulfate  (90 Base) MCG/ACT inhaler USE 2 INHALATIONS EVERY 6 HOURS AS NEEDED FOR WHEEZING 25.5 g 2    carvedilol (COREG) 25 MG tablet Take 1 tablet by mouth 2 times daily 270 tablet 3    gabapentin (NEURONTIN) 300 MG capsule Take 1 capsule by mouth nightly for 30 days.  Intended supply: 30 days 30 capsule 0    potassium chloride (KLOR-CON M) 10 MEQ strip 4    FreeStyle Lancets MISC 1 each by Does not apply route 4 times daily 200 each 5    Blood Glucose Monitoring Suppl (EMBRACE PRO GLUCOSE METER) GAETANO 1 Device by Does not apply route 4 times daily 1 Device 0    BD PEN NEEDLE JANNET U/F 32G X 4 MM MISC USE 1 PEN NEEDLE FOUR TIMES A  each 3    Blood Glucose Monitoring Suppl (FREESTYLE LITE) GAETANO 1 Device by Does not apply route 4 times daily Patient to check blood sugar 4 times a day and PRN, she is treated with multiple daily injections of insulin that require correction dosing ;A1C 8.1 ; ICD code-E11.65 ,Z79.4 (Patient taking differently: 1 Device by Does not apply route 2 times daily ) 1 Device 0     No current facility-administered medications for this visit.         Past Medical History:   Diagnosis Date    Asthma     Atrial fibrillation (HCC)     Eosinophilia     Hemoptysis     HIGH CHOLESTEROL     Hx of blood clots     Hypertension     Irregular heart beat     Other specified gastritis without mention of hemorrhage     Palpitations     Skin cancer     basal and squamous    Type II or unspecified type diabetes mellitus without mention of complication, not stated as uncontrolled      Past Surgical History:   Procedure Laterality Date    BRONCHOSCOPY  07/18/2016    Dr. Kiana Kang - brushings from [address] [place] [address],[facility] [place]-  08/16/2018    Dr. Felton Noriega  02/07/2017    Dr. Kristofer Pantojaco - sigmoid diverticulosis, polypectomies x3    COLONOSCOPY  01/17/2014    Dr. Kristofer Pantojaco - sigmoid diverticulosis, polypectomies x3, internal hemorrhoids    COLONOSCOPY  10/10/2018    w/biopsy performed by Renea Mabry MD at [address] N/A 8/7/2020    COLONOSCOPY DIAGNOSTIC performed by Kiana Kang MD at [address]  08/21/2018    Dr. Da Shabazz - x3 (LIMA-LAD, L SV-D1-PLV) modified BL MAZE procedure w/obliteration of WAYNE using 45mm Magi Gu Left 08/16/2018    Dr. Renee Torres - Paulino Escobar # [id number] [employer]    MITRAL VALVE REPLACEMENT  08/21/2018    Dr. Da Shabazz - 27mm Medtronic Cinch tissue valve    SKIN BIOPSY      TRANSESOPHAGEAL ECHOCARDIOGRAM  08/21/2018    during CABG/MVR    TUNNELED VENOUS CATHETER PLACEMENT Left 08/23/2018    Dr. Qeuntin Senior - IJ for HD---since removed    UPPER GASTROINTESTINAL ENDOSCOPY N/A 10/10/2018    w/biopsy performed by Renea Mabry MD at [address] [place] [address]     Family History   Problem Relation Age of Onset    Cancer Father     Asthma Mother     Hypertension Mother     Heart Disease Mother     High Blood Pressure Mother      Social History     Socioeconomic History    Marital status:      Spouse name: Not on file    Number of children: Not on file    Years of education: Not on file    Highest education level: Not on file   Occupational History    Not on file   Social Needs    Financial resource strain: Not on file    Food insecurity     Worry: Not on file     Inability: Not on file    Transportation needs     Medical: Not on file     Non-medical: Not on file   Tobacco Use    Smoking status: Never Smoker    Smokeless tobacco: Never Used   Substance and Sexual Activity    Alcohol use: No    Drug use: No    Sexual activity: Not on file   Lifestyle    Physical activity     Days per week: Not on file     Minutes per session: Not on file    Stress: Not on file   Relationships    Social connections     Talks on phone: Not on file     Gets together: Not on file     Attends Hinduism service: Not on file     Active member of club or organization: Not on file     Attends meetings of clubs or organizations: Not on file     Relationship status: Not on file    Intimate partner violence     Fear of current or ex partner: Not on file     Emotionally abused: Not on file     Physically abused: Not on file     Forced sexual activity: Not on file Other Topics Concern    Not on file   Social History Narrative    Not on file       Review of Systems:   · Constitutional: there has been no unanticipated weight loss. There's been no change in energy level, sleep pattern, or activity level. · Eyes: No visual changes or diplopia. No scleral icterus. · ENT: No Headaches, hearing loss or vertigo. No mouth sores or sore throat. · Cardiovascular: Reviewed in HPI  · Respiratory: No cough or wheezing, no sputum production. No hematemesis. · Gastrointestinal: No abdominal pain, appetite loss, blood in stools. No change in bowel or bladder habits. · Genitourinary: No dysuria, trouble voiding, or hematuria. · Musculoskeletal:  No gait disturbance, weakness or joint complaints. · Integumentary: No rash or pruritis. · Neurological: No headache, diplopia, change in muscle strength, numbness or tingling. No change in gait, balance, coordination, mood, affect, memory, mentation, behavior. · Psychiatric: No anxiety, no depression. · Endocrine: No malaise, fatigue or temperature intolerance. No excessive thirst, fluid intake, or urination. No tremor. · Hematologic/Lymphatic: No abnormal bruising or bleeding, blood clots or swollen lymph nodes. · Allergic/Immunologic: No nasal congestion or hives. Physical Examination:    Vitals:    11/19/20 1008   BP: 110/86   Pulse: 100   SpO2: 97%   Weight: 202 lb 9.6 oz (91.9 kg)   Height: 5' 8\" (1.727 m)     Body mass index is 30.81 kg/m².      Wt Readings from Last 3 Encounters:   11/19/20 202 lb 9.6 oz (91.9 kg)   11/12/20 203 lb (92.1 kg)   11/06/20 203 lb 0.7 oz (92.1 kg)     BP Readings from Last 3 Encounters:   11/19/20 110/86   11/12/20 124/72   10/23/20 118/76     Constitutional and General Appearance:   WD/WN in NAD  HEENT:  NC/AT  MARIBEL  No problems with hearing  Skin:  Warm, dry  Respiratory:  · Normal excursion and expansion without use of accessory muscles  · Resp Auscultation: Normal breath sounds without dullness  Cardiovascular:  · The apical impulses not displaced  · Heart tones are crisp and normal  · Cervical veins are not engorged  · The carotid upstroke is normal in amplitude and contour without delay or bruit  · JVP less than 8 cm H2O  RRR with nl S1 and S2 without m,r,g  · Peripheral pulses are symmetrical and full  · There is no clubbing, cyanosis of the extremities. · No edema  · Femoral Arteries: 2+ and equal  · Pedal Pulses: 2+ and equal   Neck:  · No thyromegaly  Abdomen:  · No masses or tenderness  · Liver/Spleen: No Abnormalities Noted  Neurological/Psychiatric:  · Alert and oriented in all spheres  · Moves all extremities well  · Exhibits normal gait balance and coordination  · No abnormalities of mood, affect, memory, mentation, or behavior are noted    JUDE 10/21/2020  Mildly dilated left ventricular size and normal wall thickness. Global left ventricular function is severely decreased with ejection fraction estimated at 25%. Patient is in atrial fibrillation during procedure. The bioprosthetic mitral valve is well seated with a mean gradient of 5mmHg and maximum pressure gradient of 15 mmHg with heart rate of 89 bpm. Pressure half time of 82 ms. There is trivial mitral regurgitation. Left atrial enlargement. Spontaneous echo contrast seen in the left atrium. A large stump of the left atrial appendage with no thrombus noted. Patient has history of surgical ligation of the left atrial appendage. There are spontaneous echo contrast in the atrial appendage. The aortic valve is thickened/calcified with decreased leaflet mobility consistent with aortic stenosis. There is trivial aortic insufficiency. There is moderate tricuspid regurgitation. ECHO 9/15/20  Left ventricular cavity size is dilated. There is normal wall thickness with a moderately severe reduction in   systolic function. LV ejection fraction is visually estimated to be 25-30%. Indeterminate diastolic function. The right ventricle is not well visualized but appears to be normal in size with moderately reduced function. The left atrium is moderately dilated. A bioprosthetic mitral valve with a mean gradient of 7 mmHg. This may be a normal gradient for this valve but could suggest mild stenosis. Trivial mitral regurgitation. The aortic valve is thickened/calcified with a mean gradient of 16mmHg consistent with at least mild aortic stenosis. This is likely underestimated due to low cardiac output secondary to LV dysfunction. No significant aortic valve regurgitation. Moderate tricuspid regurgitation with RVSP of 48 mmHg. JUDE 11/1/2019  Normal left ventricular cavity size and wall thickness. Global left ventricular function is moderate-to-severely decreased with ejection fraction estimated from 35% to 40%. Severe apical akinesis noted. The bioprosthetic mitral valve is well seated with a mean gradient of 2mmHg and maximum pressure gradient of 5 mmHg. There is trivial mitral regurgitation. Left atrial enlargement. Spontaneous echo contrast seen in the left atrium. Stump of the left atrial appendage noted with no thrombus. The aortic valve is thickened/calcified with decreased leaflet mobility consistent with aortic stenosis. There is trivial aortic insufficiency. Labs were reviewed including labs from other hospital systems through [facility]. Cardiac testing was reviewed including echos, nuclear scans, cardiac catheterization, including from other hospital systems through [facility]. Assessment:    1. Systolic CHF, chronic ([address]    2. Nonrheumatic aortic valve stenosis    3. Nonrheumatic mitral valve regurgitation    4. S/P MVR (mitral valve repair)    5. Coronary artery disease due to lipid rich plaque       sCHF  Stable, compensated  Coreg was increased last week per Dr. Nga Gipson. Begin valsartan 40mg qd 12/1/2020. Will consider adding Entresto next visit.   She is unable to

## 2020-11-17 RX ORDER — ALBUTEROL SULFATE 90 UG/1
AEROSOL, METERED RESPIRATORY (INHALATION)
Qty: 25.5 G | Refills: 2 | Status: SHIPPED | OUTPATIENT
Start: 2020-11-17 | End: 2021-11-19

## 2020-11-19 ENCOUNTER — OFFICE VISIT (OUTPATIENT)
Dept: CARDIOLOGY CLINIC | Age: 75
End: 2020-11-19
Payer: MEDICARE

## 2020-11-19 VITALS
DIASTOLIC BLOOD PRESSURE: 86 MMHG | SYSTOLIC BLOOD PRESSURE: 110 MMHG | HEIGHT: 68 IN | WEIGHT: 202.6 LBS | BODY MASS INDEX: 30.71 KG/M2 | HEART RATE: 100 BPM | OXYGEN SATURATION: 97 %

## 2020-11-19 PROCEDURE — 99204 OFFICE O/P NEW MOD 45 MIN: CPT | Performed by: INTERNAL MEDICINE

## 2020-11-19 RX ORDER — VALSARTAN 40 MG/1
40 TABLET ORAL DAILY
Qty: 90 TABLET | Refills: 3 | Status: SHIPPED | OUTPATIENT
Start: 2020-11-19 | End: 2020-12-15

## 2020-11-20 ENCOUNTER — TELEPHONE (OUTPATIENT)
Dept: ORTHOPEDIC SURGERY | Age: 75
End: 2020-11-20

## 2020-11-20 ENCOUNTER — OFFICE VISIT (OUTPATIENT)
Dept: ORTHOPEDIC SURGERY | Age: 75
End: 2020-11-20
Payer: MEDICARE

## 2020-11-20 ENCOUNTER — ANTI-COAG VISIT (OUTPATIENT)
Dept: FAMILY MEDICINE CLINIC | Age: 75
End: 2020-11-20

## 2020-11-20 ENCOUNTER — TELEPHONE (OUTPATIENT)
Dept: FAMILY MEDICINE CLINIC | Age: 75
End: 2020-11-20

## 2020-11-20 ENCOUNTER — TELEPHONE (OUTPATIENT)
Dept: CARDIOLOGY CLINIC | Age: 75
End: 2020-11-20

## 2020-11-20 VITALS — WEIGHT: 202.6 LBS | TEMPERATURE: 97 F | BODY MASS INDEX: 30.71 KG/M2 | HEIGHT: 68 IN

## 2020-11-20 LAB — INR BLD: 2.3

## 2020-11-20 PROCEDURE — 99214 OFFICE O/P EST MOD 30 MIN: CPT | Performed by: PHYSICIAN ASSISTANT

## 2020-11-20 NOTE — PROGRESS NOTES
pain radiating into the lumbar spine as well. The patient describes that working, cooking, and housework increase her pain more significantly. The pain medication does help relieve her pain. She describes that over the last week is when the pain has improved but she has taken things very easy and has decreased her activity significantly. She is hoping that more can be done to help with her pain. Associated signs and symptoms:   Neurogenic bowel or bladder symptoms:  no   Perceived weakness:  no   Difficulty walking:  no            Past medical, surgical, social and family history reviewed with the patient.      Past Medical History:   Past Medical History:   Diagnosis Date    Asthma     Atrial fibrillation (HCC)     Eosinophilia     Hemoptysis     HIGH CHOLESTEROL     Hx of blood clots     Hypertension     Irregular heart beat     Other specified gastritis without mention of hemorrhage     Palpitations     Skin cancer     basal and squamous    Type II or unspecified type diabetes mellitus without mention of complication, not stated as uncontrolled       Past Surgical History:     Past Surgical History:   Procedure Laterality Date    BRONCHOSCOPY  07/18/2016    Dr. Agatha Carrillo - brushings from [address] [place] [address],[facility] [place]-  08/16/2018    Dr. Oj Navarro  02/07/2017    Dr. Tiffanie Romero - sigmoid diverticulosis, polypectomies x3    COLONOSCOPY  01/17/2014    Dr. Tiffanie Romero - sigmoid diverticulosis, polypectomies x3, internal hemorrhoids    COLONOSCOPY  10/10/2018    w/biopsy performed by Pascual Collins MD at [address] [facility] N/A 8/7/2020    COLONOSCOPY DIAGNOSTIC performed by Agatha Carrillo MD at [address]  08/21/2018    Dr. Obi Langston - x3 (LIMA-LAD, L SV-D1-PLV) modified BL MAZE procedure w/obliteration of WAYNE using 45mm AtriClip    HYSTERECTOMY      INSERTABLE CARDIAC MONITOR Left 08/16/2018    Dr. Karolyn Smith - Robinson Matiasminesh # [id number] [employer]    MITRAL VALVE REPLACEMENT  08/21/2018    Dr. Mana Ruiz - 27mm Medtronic Cinch tissue valve    SKIN BIOPSY      TRANSESOPHAGEAL ECHOCARDIOGRAM  08/21/2018    during CABG/MVR    TUNNELED VENOUS CATHETER PLACEMENT Left 08/23/2018    Dr. Mame Grider for HD---since removed    UPPER GASTROINTESTINAL ENDOSCOPY N/A 10/10/2018    w/biopsy performed by Karlos Foster MD at [address] [place] [address]     Current Medications:     Current Outpatient Medications:     valsartan (DIOVAN) 40 MG tablet, Take 1 tablet by mouth daily, Disp: 90 tablet, Rfl: 3    albuterol sulfate  (90 Base) MCG/ACT inhaler, USE 2 INHALATIONS EVERY 6 HOURS AS NEEDED FOR WHEEZING, Disp: 25.5 g, Rfl: 2    carvedilol (COREG) 25 MG tablet, Take 1 tablet by mouth 2 times daily, Disp: 270 tablet, Rfl: 3    gabapentin (NEURONTIN) 300 MG capsule, Take 1 capsule by mouth nightly for 30 days.  Intended supply: 30 days, Disp: 30 capsule, Rfl: 0    potassium chloride (KLOR-CON M) 10 MEQ extended release tablet, TAKE 1 TABLET DAILY, Disp: 90 tablet, Rfl: 1    torsemide (DEMADEX) 100 MG tablet, Take 1 tablet by mouth daily TAKE 1 TABLET DAILY, Disp: 90 tablet, Rfl: 1    blood glucose test strips (FREESTYLE LITE) strip, USE TO TEST FOUR TIMES A DAY, Disp: 100 strip, Rfl: 4    insulin glargine (LANTUS SOLOSTAR) 100 UNIT/ML injection pen, INJECT 50 UNITS IN THE MORNING AND 24 UNITS AT BEDTIME (Patient taking differently: every morning 30 units in the morning and PRN at night if needed), Disp: 90 mL, Rfl: 1    albuterol (PROVENTIL) (2.5 MG/3ML) 0.083% nebulizer solution, Take 3 mLs by nebulization every 6 hours as needed for Wheezing, Disp: 120 each, Rfl: 3    methocarbamol (ROBAXIN-750) 750 MG tablet, Take 1 tablet by mouth 2 times daily (Patient taking differently: Take 750 mg by mouth as needed ), Disp: 60 tablet, Rfl: 1    exenatide (BYETTA 10 MCG PEN) 10 MCG/0.04ML injection, Inject 0.04 mLs into the skin daily (Patient taking differently: Inject 10 mcg into the skin 2 times daily (with meals) ), Disp: 3 pen, Rfl: 3    spironolactone (ALDACTONE) 25 MG tablet, TAKE 1 TABLET DAILY, Disp: 90 tablet, Rfl: 3    polyethylene glycol (GLYCOLAX) 17 GM/SCOOP powder, Take 17 g by mouth daily, Disp: , Rfl:     omeprazole (PRILOSEC) 20 MG delayed release capsule, Take 20 mg by mouth daily, Disp: , Rfl:     FreeStyle Lancets MISC, 1 each by Does not apply route 4 times daily, Disp: 200 each, Rfl: 5    ondansetron (ZOFRAN) 4 MG tablet, Take 1 tablet by mouth 3 times daily as needed for Nausea or Vomiting, Disp: 30 tablet, Rfl: 0    warfarin (COUMADIN) 5 MG tablet, TAKE 1 TABLET DAILY (Patient taking differently: Take 5 mg by mouth daily Managed by PCP), Disp: 100 tablet, Rfl: 4    montelukast (SINGULAIR) 10 MG tablet, TAKE 1 TABLET NIGHTLY, Disp: 90 tablet, Rfl: 4    Blood Glucose Monitoring Suppl (EMBRACE PRO GLUCOSE METER) GAETANO, 1 Device by Does not apply route 4 times daily, Disp: 1 Device, Rfl: 0    HUMALOG KWIKPEN 100 UNIT/ML SOPN, TAKE AS INSTRUCTED BY YOUR PRESCRIBER (Patient taking differently:  Only if high blood sugar-has not been using), Disp: 15 mL, Rfl: 8    nystatin (MYCOSTATIN) 515936 UNIT/ML suspension, TAKE ONE TEASPOONFUL BY MOUTH FOUR TIMES A DAY FOR 5 DAYS, Disp: 1 Bottle, Rfl: 2    aspirin 81 MG chewable tablet, Take 1 tablet by mouth daily, Disp: 30 tablet, Rfl: 3    BD PEN NEEDLE JANNET U/F 32G X 4 MM MISC, USE 1 PEN NEEDLE FOUR TIMES A DAY, Disp: 360 each, Rfl: 3    vitamin B-12 500 MCG tablet, Take 1 tablet by mouth daily, Disp: 30 tablet, Rfl: 3    Blood Glucose Monitoring Suppl (FREESTYLE LITE) GAETANO, 1 Device by Does not apply route 4 times daily Patient to check blood sugar 4 times a day and PRN, she is treated with multiple daily injections of insulin that require correction dosing ;A1C 8.1 ; ICD code-E11.65 ,Z79.4 (Patient taking differently: 1 Device by Does not apply route 2 right. There is no paraspinal spasm. Skin:There are no rashes, ulcerations or lesions  · Range of Motion:  limited by 50% in all planes due to pain   · Strength: 5/5 bilateral upper extremities  · Special Tests:   Spurling's and Hampton's are negative bilaterally. Sharp and Impingement tests are negative bilaterally. · Skin:There are no rashes, ulcerations or lesions in right & left upper extremities. · Reflexes: Bilaterally triceps, biceps and brachioradialis are 1+. Clonus absent bilaterally at the feet. No pathological reflexes are noted. · Gait & station: normal, patient ambulates without assistance and no ataxia  · Additional Examinations:  · RIGHT UPPER EXTREMITY:  Inspection/examination of the right upper extremity does not show any tenderness, deformity or injury. Range of motion is unremarkable and pain-free. There is no gross instability. There are no rashes, ulcerations or lesions. Strength and tone are normal. No atrophy or abnormal movements are noted. · LEFT UPPER EXTREMITY: Inspection/examination of the left upper extremity does not show any tenderness, deformity or injury. Range of motion is unremarkable and pain-free. There is no gross instability. There are no rashes, ulcerations or lesions. Strength and tone are normal. No atrophy or abnormal movements are noted. · RIGHT LOWER EXTREMITY: Inspection/examination of the right lower extremity does not show any tenderness, deformity or injury. Range of motion is normal and pain-free. There is no gross instability. There are no rashes, ulcerations or lesions. Strength and tone are normal. No atrophy or abnormal movements are noted. · LEFT LOWER EXTREMITY:  Inspection/examination of the left lower extremity does not show any tenderness, deformity or injury. Range of motion is normal and pain-free. There is no gross instability. There are no rashes, ulcerations or lesions.   Strength and tone are normal. No atrophy or abnormal movements are noted. Diagnostic Testing:    CT Scan of the Cervical Spine from  11/12/20:  FINDINGS:    Vertebral body height and alignment are maintained.         There is no evidence of fracture or other acute osseous abnormality.         There are significant multilevel discogenic changes which are incompletely    evaluated on CT imaging.         At C3-C4 there is a moderate posterior disc osteophyte complex with minimal    central stenosis and no definite osseous foraminal stenosis.         At C4-C5 there is a moderate posterior disc osteophyte complex with minimal    central stenosis and no definite osseous foraminal stenosis.         At C6-C7  there is a small to moderate posterior disc osteophyte complex with    no significant osseous central or foraminal stenosis.         At C6-C7 there is a moderate to large posterior disc osteophyte complex which    causes mild central stenosis and mild-to-moderate left neural foraminal    stenosis.           Impression    Multilevel degenerative changes as above.  Discogenic changes are    incompletely evaluated by CT imaging.  Further evaluation with MRI may be    helpful for better characterization if clinically indicated. Results for orders placed or performed in visit on 11/05/20   Protime-INR   Result Value Ref Range    INR 1.90      Impression:       1. Cervical radiculopathy    2. Foraminal stenosis of cervical region    3. Cervical spondylosis without myelopathy    4. DDD (degenerative disc disease), cervical        Plan:  Clinical Course: Above diagnoses are improving. I discussed the diagnosis and the treatment options with Erma Porter today. In Summary:  The various treatment options were outlined and discussed with Erma Porter including:  Conservative care options: physical therapy, ice, medications, bracing, and activity modification. The indications for therapeutic injections. The indications for additional imaging/laboratory studies.   The indications for (possible future) interventions. After considering the various options discussed, Lucy Pascal elected to pursue a course of treatment that includes the followin. Medications:  No further recommendations for new medications. The patient will continue Gabapentin 300 mg 1 po qhs, as this is helping with her current pain. 2. PT:  Encouraged to continue with Home exercise program.    3. Further studies: No further studies. The CT Scan of the Cervical spine was discussed in the office today. A CT Scan of the Lumbar Spine could be considered if the lower back pain begins to worsen. The patient does have a recent CT Scan of the Thoracic spine from 2019.     4. Interventional:  We discussed pursuing a C6-7 IL epidural steroid injection to address the pain. Radiologic imaging and symptoms confirm the pain etiology. Risks, benefits and alternatives of interventional options were discussed. These include and are not limited to bleeding, infection, spinal headache, nerve injury and lack of pain relief. The patient verbalized understanding and would like to proceed. The patient will be scheduled accordingly. MONALISA #1. First Epidural steroid injection for therapeutic purposes    As such, I have confirmed the patient has met the general requirements including failure of conservative management including prescription strength analgesics, adjunctive medications were utilized , therapeutic exercise program, and no underlying addiction or behavioral disorders were identified to the ability of the provider. Symptoms are impacting their ADLs or iADLs such as movements of the neck, housework, cooking, cleaning and significant pain was noted in the HPI. Imaging studies as noted in the diagnostic imaging section of the consultation were reviewed and correlated with clinical findings. Fluoroscopy is utilized for interventional procedures.     The patient presents with radicular pain or

## 2020-11-20 NOTE — LETTER
Please schedule the following with:     Date:   20 @ 9:00    Account: [de-identified]  Patient: Jazmin Michel    : 1945  Address:  [address]    Phone (H):  [phone] (home)      ----------------------------------------------------------------------------------------------  Diagnosis:     ICD-10-CM    1. Cervical radiculopathy  M54.12    2. Cervical spondylosis without myelopathy  M47.812    3. DDD (degenerative disc disease), cervical  M50.30      Procedure: Cervical Interlaminar Epidural Steroid Injection     Levels: C6-7   Side: Midline   CPT Codes 93598    ----------------------------------------------------------------------------------------------  Injection # 1  [facility]    Attending Physician       Karo Mack.  Yvonne Dave MD.  ----------------------------------------------------------------------------------------------  Injection Scheduled For:    At:    1st [facility]   Pre-Cert#    2nd Insurance     Pre-Cert#    Comments:  COVID TEST Needed: yes    · Infection control  · Tested positive for MRSA in past 12 months:  no  · Tested positive for MSSA \"staph infection\" in past 12 months: no  · Tested positive for VRE (Vancomycin Resistant Enterococci) in past 12 months:   no  · Currently on any antibiotics for an infection: no  · Anticoagulants:  · On a blood thinner:  yes , Warfarin Dr. Lisa Lucio [phone]  · Any history of bleeding disorder: no   · Advanced Liver disease: no   · Advanced Renal disease: no   · Glaucoma: no   · Diabetes: yes     Sedation:  Yes  -----------------------------------------------------------------------------------------------  Allergies   Allergen Reactions    Gcymzqfr-Gaokthv-Ikcefx [Fluocinolone] Shortness Of Breath    Ciprofloxacin Shortness Of Breath    Flagyl [Metronidazole] Shortness Of Breath     Has taken diflucan at home 12/7/15    Metformin And Related [Metformin And Related] Shortness Of Breath    Benazepril

## 2020-11-20 NOTE — TELEPHONE ENCOUNTER
L/m for approval to hold COUMADIN for 5 days prior to \[facility]\"" SERVICES on 12/14/20. Patient aware of hold date.

## 2020-11-20 NOTE — TELEPHONE ENCOUNTER
Dr Carter Anchors asking if pt can hold coumadin 5 days prior to jose injection on 12/14/20. Please fax [phone].

## 2020-11-24 ENCOUNTER — TELEPHONE (OUTPATIENT)
Dept: ORTHOPEDIC SURGERY | Age: 75
End: 2020-11-24

## 2020-11-24 RX ORDER — BLOOD-GLUCOSE METER
KIT MISCELLANEOUS
Qty: 200 STRIP | Refills: 0 | Status: SHIPPED | OUTPATIENT
Start: 2020-11-24 | End: 2021-01-05

## 2020-11-24 NOTE — TELEPHONE ENCOUNTER
Auth: # [id number]    Date: 11/24/2020 thru 5/23/2021  Type of SX:  Outpatient MONALISA  Location: [facility]  CPT: 29491   DX Code: M54.12, Y80.560, M50.30  SX area: Cervical SPine  Insurance: [employer]    CPT: 00130 58, [id number]  [facility]

## 2020-11-25 ENCOUNTER — TELEPHONE (OUTPATIENT)
Dept: FAMILY MEDICINE CLINIC | Age: 75
End: 2020-11-25

## 2020-11-25 NOTE — TELEPHONE ENCOUNTER
Please start a PA on Freestyle Lite test strips for 4 times daily #200    Dx: E11.65, Z79.4    Dr. Veronica Calderon

## 2020-11-25 NOTE — TELEPHONE ENCOUNTER
S/w [address] at [facility]. Verbal okay given for patient to hold Coumadin 5 days prior to procedure on 12/14/20.

## 2020-11-25 NOTE — TELEPHONE ENCOUNTER
Patients spouse said she needs Prior Authorization from insurance to take blood 4 times a day.     Please give him a callback when it is authorized

## 2020-11-25 NOTE — TELEPHONE ENCOUNTER
Spoke with office staff and advised her of the message below. She voiced understanding .  Call complete

## 2020-11-27 ENCOUNTER — TELEPHONE (OUTPATIENT)
Dept: FAMILY MEDICINE CLINIC | Age: 75
End: 2020-11-27

## 2020-11-27 RX ORDER — BLOOD-GLUCOSE METER
KIT MISCELLANEOUS
Qty: 200 STRIP | Refills: 4 | Status: SHIPPED | OUTPATIENT
Start: 2020-11-27 | End: 2021-01-05 | Stop reason: SDUPTHER

## 2020-11-27 NOTE — TELEPHONE ENCOUNTER
Angie Coburn is calling again for his wife. He is not happy, he states that she is out of test strips and doesn't know if she needs her insulin or not. He says that they have to go through this every 3 months. Pls call Angie Coburn and advise.

## 2020-11-27 NOTE — TELEPHONE ENCOUNTER
----- Message from Dustin Coronel sent at 11/27/2020 12:12 PM EST -----  Subject: Message to Provider    QUESTIONS  Information for Provider? [phone]   pt called in with this number from Jethro Almaraz for Jessica Childers at the office   states they were trying to get pt's diabetic strips and [employer] would like   a call from the PCP office. ---------------------------------------------------------------------------  --------------  Pravin PAN  What is the best way for the office to contact you? OK to leave message on   voicemail  Preferred Call Back Phone Number? [phone]  ---------------------------------------------------------------------------  --------------  SCRIPT ANSWERS  Relationship to Patient? Other  Representative Name? Taylor Matute  Is the [place] [employer] on the appropriate HIPAA document in Epic?  Yes

## 2020-11-30 ENCOUNTER — TELEPHONE (OUTPATIENT)
Dept: ADMINISTRATIVE | Age: 75
End: 2020-11-30

## 2020-11-30 ENCOUNTER — HOSPITAL ENCOUNTER (OUTPATIENT)
Age: 75
Discharge: HOME | End: 2020-11-30
Payer: MEDICARE

## 2020-11-30 ENCOUNTER — HOSPITAL ENCOUNTER (OUTPATIENT)
Dept: NON INVASIVE DIAGNOSTICS | Age: 75
Discharge: HOME | End: 2020-11-30
Payer: MEDICARE

## 2020-11-30 ENCOUNTER — NURSE ONLY (OUTPATIENT)
Dept: CARDIOLOGY CLINIC | Age: 75
End: 2020-11-30
Payer: MEDICARE

## 2020-11-30 LAB
A/G RATIO: 0.9 (ref 1.1–2.2)
ALBUMIN SERPL-MCNC: 2.9 G/DL (ref 3.4–5)
ALP BLD-CCNC: 160 U/L (ref 40–129)
ALT SERPL-CCNC: 14 U/L (ref 10–40)
ANION GAP SERPL CALCULATED.3IONS-SCNC: 11 MMOL/L (ref 3–16)
AST SERPL-CCNC: 20 U/L (ref 15–37)
BASOPHILS ABSOLUTE: 0 K/UL (ref 0–0.2)
BASOPHILS RELATIVE PERCENT: 0.4 %
BILIRUB SERPL-MCNC: 0.4 MG/DL (ref 0–1)
BUN BLDV-MCNC: 28 MG/DL (ref 7–20)
CALCIUM SERPL-MCNC: 8.7 MG/DL (ref 8.3–10.6)
CHLORIDE BLD-SCNC: 103 MMOL/L (ref 99–110)
CO2: 26 MMOL/L (ref 21–32)
CREAT SERPL-MCNC: 1.3 MG/DL (ref 0.6–1.2)
EOSINOPHILS ABSOLUTE: 0.3 K/UL (ref 0–0.6)
EOSINOPHILS RELATIVE PERCENT: 5.5 %
GFR AFRICAN AMERICAN: 48
GFR NON-AFRICAN AMERICAN: 40
GLOBULIN: 3.1 G/DL
GLUCOSE BLD-MCNC: 97 MG/DL (ref 70–99)
HCT VFR BLD CALC: 36.5 % (ref 36–48)
HEMOGLOBIN: 11.8 G/DL (ref 12–16)
IRON SATURATION: 16 % (ref 15–50)
IRON: 49 UG/DL (ref 37–145)
LYMPHOCYTES ABSOLUTE: 0.6 K/UL (ref 1–5.1)
LYMPHOCYTES RELATIVE PERCENT: 12.9 %
MCH RBC QN AUTO: 28.5 PG (ref 26–34)
MCHC RBC AUTO-ENTMCNC: 32.4 G/DL (ref 31–36)
MCV RBC AUTO: 87.9 FL (ref 80–100)
MONOCYTES ABSOLUTE: 0.4 K/UL (ref 0–1.3)
MONOCYTES RELATIVE PERCENT: 8.4 %
NEUTROPHILS ABSOLUTE: 3.6 K/UL (ref 1.7–7.7)
NEUTROPHILS RELATIVE PERCENT: 72.8 %
PDW BLD-RTO: 17 % (ref 12.4–15.4)
PLATELET # BLD: 205 K/UL (ref 135–450)
PMV BLD AUTO: 9.6 FL (ref 5–10.5)
POTASSIUM SERPL-SCNC: 3.6 MMOL/L (ref 3.5–5.1)
PRO-BNP: 4640 PG/ML (ref 0–124)
RBC # BLD: 4.15 M/UL (ref 4–5.2)
SODIUM BLD-SCNC: 140 MMOL/L (ref 136–145)
TOTAL IRON BINDING CAPACITY: 310 UG/DL (ref 260–445)
TOTAL PROTEIN: 6 G/DL (ref 6.4–8.2)
WBC # BLD: 5 K/UL (ref 4–11)

## 2020-11-30 PROCEDURE — 83550 IRON BINDING TEST: CPT

## 2020-11-30 PROCEDURE — 85025 COMPLETE CBC W/AUTO DIFF WBC: CPT

## 2020-11-30 PROCEDURE — 93298 REM INTERROG DEV EVAL SCRMS: CPT | Performed by: INTERNAL MEDICINE

## 2020-11-30 PROCEDURE — G2066 INTER DEVC REMOTE 30D: HCPCS | Performed by: INTERNAL MEDICINE

## 2020-11-30 PROCEDURE — 93351 STRESS TTE COMPLETE: CPT

## 2020-11-30 PROCEDURE — 83540 ASSAY OF IRON: CPT

## 2020-11-30 PROCEDURE — 6360000002 HC RX W HCPCS: Performed by: INTERNAL MEDICINE

## 2020-11-30 PROCEDURE — 83880 ASSAY OF NATRIURETIC PEPTIDE: CPT

## 2020-11-30 PROCEDURE — 36415 COLL VENOUS BLD VENIPUNCTURE: CPT

## 2020-11-30 PROCEDURE — 80053 COMPREHEN METABOLIC PANEL: CPT

## 2020-11-30 RX ORDER — DOBUTAMINE HYDROCHLORIDE 200 MG/100ML
10 INJECTION INTRAVENOUS CONTINUOUS
Status: DISCONTINUED | OUTPATIENT
Start: 2020-11-30 | End: 2020-12-01 | Stop reason: HOSPADM

## 2020-11-30 RX ADMIN — DOBUTAMINE HYDROCHLORIDE 5 MCG/KG/MIN: 200 INJECTION INTRAVENOUS at 08:43

## 2020-11-30 NOTE — LETTER
[address] [facility] [phone]  [address]   [address] [address] [phone]    Pacemaker/Defibrillator Clinic          11/30/20        Belkis Dsouza  [address] [address]        Dear Belkis Dsouza    This letter is to inform you that we received the transmission from your monitor at home that checks your implanted heart device. The next date your monitor will automatically transmit will be 1-18-21. If your report needs attention we will notify you. Your device and monitor are wireless and most transmit cellularly, but please periodically check your monitor is still plugged in to the electrical outlet. If you still use the telephone land line to send please ensure the connection to the phone abel is secure. This will help to ensure successful automatic transmissions in the future. Also, the monitor needs to be close to you while sleeping at night. Please be aware that the remote device transmission sites are periodically monitored only during regular business hours during which simultaneous in-office device clinics are being run. If your transmission requires attention, we will contact you as soon as possible. Thank you.             Beto 81

## 2020-11-30 NOTE — PROGRESS NOTES
Instructed on Dobutamine Stress Test Procedure including possible side effects/ adverse reactions. Patient verbalizes understanding and denies having any questions. Size Of Lesion In Cm (Optional): 0 Detail Level: Detailed

## 2020-12-03 ENCOUNTER — ANTI-COAG VISIT (OUTPATIENT)
Dept: FAMILY MEDICINE CLINIC | Age: 75
End: 2020-12-03

## 2020-12-03 LAB — INR BLD: 3.2

## 2020-12-08 RX ORDER — GABAPENTIN 300 MG/1
300 CAPSULE ORAL EVERY EVENING
Qty: 90 CAPSULE | Refills: 0 | OUTPATIENT
Start: 2020-12-08 | End: 2020-12-14 | Stop reason: ALTCHOICE

## 2020-12-08 NOTE — TELEPHONE ENCOUNTER
Patient last seen 2020 and medication last filled 2020:     Last seen by DISHA   2020 labs: CMP     *please ensure Rx is sent to [facility]*    Impression:         1. Cervical radiculopathy    2. Foraminal stenosis of cervical region    3. Cervical spondylosis without myelopathy    4. DDD (degenerative disc disease), cervical          Plan:  Clinical Course: Above diagnoses are improving.      I discussed the diagnosis and the treatment options with Elbacolette Leija today.      In Summary:  The various treatment options were outlined and discussed with Elba Leija including:  Conservative care options: physical therapy, ice, medications, bracing, and activity modification. The indications for therapeutic injections. The indications for additional imaging/laboratory studies. The indications for (possible future) interventions.      After considering the various options discussed, Elba Leija elected to pursue a course of treatment that includes the followin. Medications:  No further recommendations for new medications. The patient will continue Gabapentin 300 mg 1 po qhs, as this is helping with her current pain.      2. PT:  Encouraged to continue with Home exercise program.     3. Further studies: No further studies. The CT Scan of the Cervical spine was discussed in the office today. A CT Scan of the Lumbar Spine could be considered if the lower back pain begins to worsen. The patient does have a recent CT Scan of the Thoracic spine from 2019.      4. Interventional:  We discussed pursuing a C6-7 IL epidural steroid injection to address the pain. Radiologic imaging and symptoms confirm the pain etiology. Risks, benefits and alternatives of interventional options were discussed. These include and are not limited to bleeding, infection, spinal headache, nerve injury and lack of pain relief. The patient verbalized understanding and would like to proceed.   The patient will be scheduled accordingly. MONALISA #1.      First Epidural steroid injection for therapeutic purposes     As such, I have confirmed the patient has met the general requirements including failure of conservative management including prescription strength analgesics, adjunctive medications were utilized , therapeutic exercise program, and no underlying addiction or behavioral disorders were identified to the ability of the provider. Symptoms are impacting their ADLs or iADLs such as movements of the neck, housework, cooking, cleaning and significant pain was noted in the HPI. Imaging studies as noted in the diagnostic imaging section of the consultation were reviewed and correlated with clinical findings. Fluoroscopy is utilized for interventional procedures.     The patient presents with radicular pain or claudication type symptoms associated with functional impairment. Review of diagnostic imaging in the diagnostic studies section of the consultation shows nerve root compression and correlates with clinical findings. The patient has failed at least 4 weeks of appropriate conservative management.        5.  Follow up:  4-6 weeks

## 2020-12-08 NOTE — TELEPHONE ENCOUNTER
PATIENT'S  STATES THEY WERE TOLD THEY(OFFICE) KNEW THE PRESCRIPTION WOULD NOT LAST TIL THE NEXT APPT SO THEY NEEDED TO CALL WHEN IT WAS GETTING LOW. SHE IS ASKING FOR A 3 MONTH SCRIPT THROUGH [facility].

## 2020-12-09 ENCOUNTER — OFFICE VISIT (OUTPATIENT)
Dept: PRIMARY CARE CLINIC | Age: 75
End: 2020-12-09
Payer: MEDICARE

## 2020-12-09 ENCOUNTER — TELEPHONE (OUTPATIENT)
Dept: CARDIOLOGY CLINIC | Age: 75
End: 2020-12-09

## 2020-12-09 PROCEDURE — 99211 OFF/OP EST MAY X REQ PHY/QHP: CPT | Performed by: NURSE PRACTITIONER

## 2020-12-09 NOTE — TELEPHONE ENCOUNTER
----- Message from Armando Morataya MD sent at 12/8/2020 11:08 PM EST -----  Please call patient and let her know that her labs look good. No changes.   ANDREI

## 2020-12-10 LAB — SARS-COV-2: NOT DETECTED

## 2020-12-11 ENCOUNTER — TELEPHONE (OUTPATIENT)
Dept: ORTHOPEDIC SURGERY | Age: 75
End: 2020-12-11

## 2020-12-11 NOTE — TELEPHONE ENCOUNTER
General Question     Subject: WAITING ON PRESCRIPTION  Patient and /or Facility Request:Samantha August Number: [phone]

## 2020-12-11 NOTE — TELEPHONE ENCOUNTER
S/W PATIENT advised that per her request this was sent to [facility] on 12/8/2020. Patient voiced understanding of the conversation and will contact the office with further questions or concerns.

## 2020-12-14 RX ORDER — GABAPENTIN 300 MG/1
300 CAPSULE ORAL NIGHTLY
Qty: 90 CAPSULE | Refills: 0 | Status: SHIPPED | OUTPATIENT
Start: 2020-12-14 | End: 2021-03-12 | Stop reason: SDUPTHER

## 2020-12-14 NOTE — TELEPHONE ENCOUNTER
General Question     Subject: [facility] HAS NOT RECEIVED PRESCRIPTION  Patient and /or Facility Request: Mel Valles  Contact Number: [phone]

## 2020-12-14 NOTE — TELEPHONE ENCOUNTER
The patient did not stop warfarin prior to Cervical Epidural Steroid Injection that was scheduled for today. The patient will be cancelled at this time and will be rescheduled with a new stop date for the warfarin.

## 2020-12-15 ENCOUNTER — NURSE ONLY (OUTPATIENT)
Dept: CARDIOLOGY CLINIC | Age: 75
End: 2020-12-15
Payer: MEDICARE

## 2020-12-15 ENCOUNTER — OFFICE VISIT (OUTPATIENT)
Dept: CARDIOLOGY CLINIC | Age: 75
End: 2020-12-15
Payer: MEDICARE

## 2020-12-15 ENCOUNTER — TELEPHONE (OUTPATIENT)
Dept: FAMILY MEDICINE CLINIC | Age: 75
End: 2020-12-15

## 2020-12-15 ENCOUNTER — HOSPITAL ENCOUNTER (OUTPATIENT)
Age: 75
Discharge: HOME | End: 2020-12-15
Payer: MEDICARE

## 2020-12-15 VITALS
WEIGHT: 210 LBS | DIASTOLIC BLOOD PRESSURE: 66 MMHG | BODY MASS INDEX: 31.83 KG/M2 | HEIGHT: 68 IN | OXYGEN SATURATION: 98 % | SYSTOLIC BLOOD PRESSURE: 108 MMHG | HEART RATE: 98 BPM

## 2020-12-15 PROBLEM — O22.30 DVT (DEEP VEIN THROMBOSIS) IN PREGNANCY: Status: ACTIVE | Noted: 2020-12-15

## 2020-12-15 LAB
ANION GAP SERPL CALCULATED.3IONS-SCNC: 9 MMOL/L (ref 3–16)
BUN BLDV-MCNC: 30 MG/DL (ref 7–20)
CALCIUM SERPL-MCNC: 8.4 MG/DL (ref 8.3–10.6)
CHLORIDE BLD-SCNC: 102 MMOL/L (ref 99–110)
CO2: 30 MMOL/L (ref 21–32)
CREAT SERPL-MCNC: 1.4 MG/DL (ref 0.6–1.2)
GFR AFRICAN AMERICAN: 44
GFR NON-AFRICAN AMERICAN: 37
GLUCOSE BLD-MCNC: 197 MG/DL (ref 70–99)
POTASSIUM SERPL-SCNC: 3.7 MMOL/L (ref 3.5–5.1)
PRO-BNP: 4321 PG/ML (ref 0–124)
SODIUM BLD-SCNC: 141 MMOL/L (ref 136–145)

## 2020-12-15 PROCEDURE — 36415 COLL VENOUS BLD VENIPUNCTURE: CPT

## 2020-12-15 PROCEDURE — 83880 ASSAY OF NATRIURETIC PEPTIDE: CPT

## 2020-12-15 PROCEDURE — 93291 INTERROG DEV EVAL SCRMS IP: CPT | Performed by: INTERNAL MEDICINE

## 2020-12-15 PROCEDURE — 80048 BASIC METABOLIC PNL TOTAL CA: CPT

## 2020-12-15 PROCEDURE — 99214 OFFICE O/P EST MOD 30 MIN: CPT | Performed by: NURSE PRACTITIONER

## 2020-12-15 RX ORDER — VALSARTAN 40 MG/1
40 TABLET ORAL DAILY
Qty: 30 TABLET | Refills: 3 | Status: SHIPPED
Start: 2020-12-15 | End: 2020-12-16 | Stop reason: SINTOL

## 2020-12-15 NOTE — PROGRESS NOTES
Patient comes in for interrogation of their implanted loop recorder. Interrogation shows AF with a 54.3% burden. Last on 12/15/2020, longest 25 1/2 hours. Implanted for palpitations. Patient remains on warfarin. Please see interrogation for more detail. Patient will see Eryn Waters today and we will continue to follow the Patient remotely.

## 2020-12-15 NOTE — PROGRESS NOTES
[facility]     Outpatient Follow Up Note    CHIEF COMPLAINT / HPI:  Follow Up secondary to chronic systolic heart failure     Subjective:   Jen Yuan is 69 y.o. female who presents today with a history of CAD, s/p CABG 2018, AS/MR, PAF s/p DCCV 1/3/2020, atrial flutter ablation (10/2019), hx of bradycardia with ILR placed 8/2018, HTN, HLD, and systolic heart failure. Today, Ms. Ida Eden states she has been feeling very weak this morning but that she has days like that. She has had no change in her exertional shortness of breath, which is relieved with rest. She denies any palpitations, chest pain, or dizziness. Ms. Ida Eden states she has had increased swelling in BLE since the day before yesterday and that today's office weight is higher than its been in the past, though she does not weigh her self daily at home anymore. She is up 8 lbs since her LOV on 11/20/20. Ms. Ida Eden states she was instructed to not start the valsartan until the beginning of December, but has not started it yet. She does not check her blood pressure at home, though she has a cuff. Past Medical History:   Diagnosis Date    Asthma     Atrial fibrillation (HCC)     Eosinophilia     Hemoptysis     HIGH CHOLESTEROL     Hx of blood clots     Hypertension     Irregular heart beat     Other specified gastritis without mention of hemorrhage     Palpitations     Skin cancer     basal and squamous    Type II or unspecified type diabetes mellitus without mention of complication, not stated as uncontrolled      Social History:    Social History     Tobacco Use   Smoking Status Never Smoker   Smokeless Tobacco Never Used     Current Medications:  Current Outpatient Medications   Medication Sig Dispense Refill    gabapentin (NEURONTIN) 300 MG capsule Take 1 capsule by mouth nightly for 90 days.  Intended supply: 30 days 90 capsule 0    blood glucose test strips (FREESTYLE LITE) strip USE TO TEST FOUR TIMES A  strip 4 (EMBRACE PRO GLUCOSE METER) GAETANO 1 Device by Does not apply route 4 times daily 1 Device 0    HUMALOG KWIKPEN 100 UNIT/ML SOPN TAKE AS INSTRUCTED BY YOUR PRESCRIBER (Patient taking differently: Only if high blood sugar-has not been using) 15 mL 8    nystatin (MYCOSTATIN) 864663 UNIT/ML suspension TAKE ONE TEASPOONFUL BY MOUTH FOUR TIMES A DAY FOR 5 DAYS 1 Bottle 2    aspirin 81 MG chewable tablet Take 1 tablet by mouth daily 30 tablet 3    BD PEN NEEDLE JANNET U/F 32G X 4 MM MISC USE 1 PEN NEEDLE FOUR TIMES A  each 3    vitamin B-12 500 MCG tablet Take 1 tablet by mouth daily 30 tablet 3    Blood Glucose Monitoring Suppl (FREESTYLE LITE) GAETANO 1 Device by Does not apply route 4 times daily Patient to check blood sugar 4 times a day and PRN, she is treated with multiple daily injections of insulin that require correction dosing ;A1C 8.1 ; ICD code-E11.65 ,Z79.4 (Patient taking differently: 1 Device by Does not apply route 2 times daily ) 1 Device 0     No current facility-administered medications for this visit. REVIEW OF SYSTEMS:    CONSTITUTIONAL: No major weight gain or loss, weakness, night sweats or fever. Fatigue   HEENT: No new vision difficulties or ringing in the ears. RESPIRATORY: No new SOB, PND, orthopnea or cough. CARDIOVASCULAR: See HPI  GI: No nausea, vomiting, diarrhea, constipation, abdominal pain or changes in bowel habits. : No urinary frequency, urgency, incontinence hematuria or dysuria. SKIN: No cyanosis or skin lesions. MUSCULOSKELETAL: No new muscle or joint pain. NEUROLOGICAL: No syncope or TIA-like symptoms.   PSYCHIATRIC: No anxiety, pain, insomnia or depression    Objective:   PHYSICAL EXAM:      Vitals:    12/15/20 0916 12/15/20 1404   BP: 102/68 108/66   Site: Right Upper Arm    Position: Sitting Sitting   Cuff Size: Large Adult    Pulse: 98    SpO2: 98%    Weight: 210 lb (95.3 kg)    Height: 5' 8\" (1.727 m)          VITALS:  /68 (Site: Right Upper Arm, Position: Sitting, Cuff Size: Large Adult)   Pulse 98   Ht 5' 8\" (1.727 m)   Wt 210 lb (95.3 kg)   SpO2 98%   BMI 31.93 kg/m²   CONSTITUTIONAL: Cooperative, no apparent distress, and appears well nourished / developed  NEUROLOGIC:  Awake and orientated to person, place and time. PSYCH: Calm affect. SKIN: Warm and dry. HEENT: Sclera non-icteric, normocephalic, neck supple, no elevation of JVP, normal carotid pulses with no bruits and thyroid normal size. LUNGS:  No increased work of breathing and clear to auscultation, no crackles or wheezing  CARDIOVASCULAR: Irregular rate and rhythm with systolic murmur. No gallops, rubs, or abnormal heart sounds, normal PMI. The apical impulses not displaced  Heart tones are crisp and normal  Cervical veins are not engorged  ABDOMEN:  Normal bowel sounds, non-distended and non-tender to palpation  EXT: Trace edema BLE, no calf tenderness. Pulses are present bilaterally.     DATA:    Lab Results   Component Value Date    ALT 14 11/30/2020    AST 20 11/30/2020    ALKPHOS 160 (H) 11/30/2020    BILITOT 0.4 11/30/2020     Lab Results   Component Value Date    CREATININE 1.3 (H) 11/30/2020    BUN 28 (H) 11/30/2020     11/30/2020    K 3.6 11/30/2020     11/30/2020    CO2 26 11/30/2020     Lab Results   Component Value Date    TSH 2.01 10/21/2020     Lab Results   Component Value Date    WBC 5.0 11/30/2020    HGB 11.8 (L) 11/30/2020    HCT 36.5 11/30/2020    MCV 87.9 11/30/2020     11/30/2020     No components found for: CHLPL  Lab Results   Component Value Date    TRIG 202 (H) 06/12/2019    TRIG 325 (H) 08/21/2018    TRIG 193 (H) 04/25/2018     Lab Results   Component Value Date    HDL 33 (L) 06/12/2019    HDL 31 (L) 08/21/2018    HDL 34 (L) 04/25/2018     Lab Results   Component Value Date    LDLCALC 71 06/12/2019    LDLCALC see below 08/21/2018    [address] 04/25/2018     Lab Results   Component Value Date    LABVLDL see below 08/21/2018       Lab Results Component Value Date     09/02/2020     11/27/2018     Radiology Review:  Pertinent images / reports were reviewed as a part of this visit and reveals the following:    Last Echo 9/15/20:  Summary   Left ventricular cavity size is dilated. There is normal wall thickness with a moderately severe reduction in   systolic function. LV ejection fraction is visually estimated to be 25-30%. Indeterminate diastolic function. The right ventricle is not well visualized but appears to be normal in size   with moderately reduced function. The left atrium is moderately dilated. A bioprosthetic mitral valve with a mean gradient of 7 mmHg. This may be a   normal gradient for this valve but could suggest mild stenosis. Trivial mitral regurgitation. The aortic valve is thickened/calcified with a mean gradient of 16 mmHg   consistent with at least mild aortic stenosis. This is likely underestimated   due to low cardiac output secondary to LV dysfunction. No significant aortic valve regurgitation. Moderate tricuspid regurgitation with RVSP of 48 mmHg. Stress Echo 11/30/20:  Dobutamine echocardiogram for aortic stenosis. Very technically limited exam due to atrial fibrillation. Baseline echocardiogram shows severe left ventricular dysfunction with   anterior, lateral and apical hypokinesis with an ejection fraction of 20-25   %. There is no improvement in wall motion with low or high dose dobutamine   suggestive of nonviable myocardium. Mild prosthetic aortic valve stenosis with a mean gradient of 16mmHg and   peak velocity of 2.47m/s at rest. After dobutamine stress the mean AV   gradient rises to 20mmHg with 20mcg of dobutamine consistent with mild to   moderate aortic stenosis. Prosthetic mitral valve with a mean gradient of 7mmHg    Last ECG 10/21/20:  Atrial fibrillation  Abnormal ECG  When compared with ECG of 21-OCT-2020 11:10,  Vent.  rate has decreased BY 44 BPM upon the patient's clinical course and testing results. All questions and concerns were addressed to the patient/family. Alternatives to my treatment were discussed. The patient is not currently smoking. The risks related to smoking were reviewed with the patient. Recommend maintaining a smoke-free lifestyle. Patient is on a beta-blocker  Patient is on an ace-i/ARB  Patient is not on a statin; controlled with diet/exercise. Dual Antiplatelet therapy / anti-coagulation has been recommended / prescribed for this patient. Education conducted on adverse reactions including bleeding was discussed. Angiotension inhibitor/angiotension receptor blocker has been prescribed / recommended for congestive heart failure. Daily weight, low sodium diet were discussed. Patient instructed to call the office with a weight gain: > 3 # over night or 5# in one week; swelling, SOB/orthopnea/PND. The patient verbalizes understanding not to stop medications without discussing with us. Discussed exercise: 30-60 minutes 7 days/week  Discussed Low saturated fat/MARINA diet. Thank you for allowing to us to participate in the care of Sherman Butler.     Electronically signed by SOLEDAD Babcock CNP on 12/15/2020 at 8:34 AM     Documentation of today's visit sent to PCP

## 2020-12-15 NOTE — PATIENT INSTRUCTIONS
Start taking Valsartan. Check your BP every day around noon and call if top number less than 95 or you are dizzy/lightheaded. Weigh yourself every morning. Call for weight gain of 2-3 lbs overnight or 5 lbs in a week. Get blood work checked today.     Schedule appointment with Dr. Elvia Alberts in 3 months

## 2020-12-15 NOTE — TELEPHONE ENCOUNTER
called pt to advise to hold coumadin for 2 days and start at 2.5 mg Mon and Friday; 5 mg all other days, and recheck INR in 2 weeks. Per WM    Pt stated that she has been holding her coumadin since Sunday as she was advise by Dr. Marcus Sidhu office to hold this med for 5 days due to a procedure that she will have done on Friday.  Pt will like to know if this is ok with you or what do you advise

## 2020-12-15 NOTE — PROGRESS NOTES
PATIENT REACHED   YES____NO____    PREOP INSTUCTIONS Patient instructed to get their COVID-19 test done as directed by their doctor (5-7 days prior to procedure)  or patient states will get on __12/9________. Patient was notified that they need to have an appointment,number to call provided. The day the COVID test is done is considered day one. Instructed to self quarantine after test until DOS. There is a one visitor policy at [facility] for all surgeries and endoscopies. Whether the visitor can stay or will be asked to wait in the car will depend on the current policy and if social distancing can be maintained. The policy is subject to change at any time. Please make sure the visitor has a cell phone that is on,charged and able to accept calls, as this may be the way that the staff communicates with them. Pain management is NO VISITOR policyThe patients ride is expected to remain in the car with a cell phone for communication. If the ride is leaving the hospital grounds please make sure they are back in time for pickup. Have the patient inform the staff on arrival what their rides plans are while the patient is in the facility. At the MAIN there is one visitor allowed. Please note that the visitor policy is subject to change. DATE__12/18/20_______ TIME__0700_______ARRIVAL_0600_______PLACE__masc__________  NOTHING TO EAT OR DRINK  AFTER MIDNIGHT THE EVENING PRIOR OR AS INSTRUCTED BY YOUR DR.  Victoria Wallace NEED A RESPONSIBLE ADULT AGE 18 OR OLDER TO DRIVE YOU HOME  PLEASE BRING INSURANCE CARD. PICTURE ID AND COMPLETE LIST OF MEDS  WEAR LOOSE COMFORTABLE CLOTHING  FOLLOW ANY INSTRUCTIONS YOUR D[facility] OFFICE HAS GIVEN YOU,INCLUDING WHAT MEDICATIONS TO TAKE THE AM OF PROCEDURE AND WHEN AND IF YOU NEED TO STOP ANY BLOOD THINNERS. IF YOU HAVE QUESTIONS REGARDING THIS CALL THE OFFICE  THE GOAL BLOOD SUGAR THE AM OF PROCEDURE  OR LESS ABOVE THAT THE PROCEDURE MAY BE CANCELLED  ANY QUESTIONS CALL YOUR DOCTOR. ALSO,PLEASE READ THE INSTRUCTION PACKET FROM YOUR DR IF YOU RECEIVED ONE.   SPINE INTERVENTION NUMBER [phone]

## 2020-12-16 ENCOUNTER — TELEPHONE (OUTPATIENT)
Dept: CARDIOLOGY CLINIC | Age: 75
End: 2020-12-16

## 2020-12-16 NOTE — TELEPHONE ENCOUNTER
Called patient to discuss labs. She states her legs/feet are still swollen. BNP still elevated at 4321. Kidney function slightly elevated at 1.4. She also states that she took the first dose of valsartan today and her blood pressure dropped to low 90s/60s and she became dizzy and couldn't move her arms. Instructed her to stop valsartan due to hypotension and elevated kidney function. She is to take an extra 50mg of torsemide tomorrow afternoon and monitor fluid/sodium intake and call next week to let us know know symptoms are.

## 2020-12-18 ENCOUNTER — HOSPITAL ENCOUNTER (OUTPATIENT)
Age: 75
Setting detail: OUTPATIENT SURGERY
Discharge: HOME | End: 2020-12-18
Attending: PHYSICAL MEDICINE & REHABILITATION | Admitting: PHYSICAL MEDICINE & REHABILITATION
Payer: MEDICARE

## 2020-12-18 ENCOUNTER — APPOINTMENT (OUTPATIENT)
Dept: GENERAL RADIOLOGY | Age: 75
End: 2020-12-18
Attending: PHYSICAL MEDICINE & REHABILITATION
Payer: MEDICARE

## 2020-12-18 VITALS
WEIGHT: 211 LBS | DIASTOLIC BLOOD PRESSURE: 66 MMHG | HEIGHT: 68 IN | RESPIRATION RATE: 16 BRPM | SYSTOLIC BLOOD PRESSURE: 104 MMHG | BODY MASS INDEX: 31.98 KG/M2 | HEART RATE: 91 BPM | OXYGEN SATURATION: 96 %

## 2020-12-18 LAB
APTT: 30.9 SEC (ref 24.2–36.2)
GLUCOSE BLD-MCNC: 90 MG/DL (ref 70–99)
INR BLD: 1.2 (ref 0.86–1.14)
PERFORMED ON: NORMAL
PROTHROMBIN TIME: 13.9 SEC (ref 10–13.2)

## 2020-12-18 PROCEDURE — 77003 FLUOROGUIDE FOR SPINE INJECT: CPT

## 2020-12-18 PROCEDURE — 2709999900 HC NON-CHARGEABLE SUPPLY: Performed by: PHYSICAL MEDICINE & REHABILITATION

## 2020-12-18 PROCEDURE — 36415 COLL VENOUS BLD VENIPUNCTURE: CPT

## 2020-12-18 PROCEDURE — 2580000003 HC RX 258: Performed by: PHYSICAL MEDICINE & REHABILITATION

## 2020-12-18 PROCEDURE — 85730 THROMBOPLASTIN TIME PARTIAL: CPT

## 2020-12-18 PROCEDURE — 99152 MOD SED SAME PHYS/QHP 5/>YRS: CPT | Performed by: PHYSICAL MEDICINE & REHABILITATION

## 2020-12-18 PROCEDURE — 2500000003 HC RX 250 WO HCPCS: Performed by: PHYSICAL MEDICINE & REHABILITATION

## 2020-12-18 PROCEDURE — 85610 PROTHROMBIN TIME: CPT

## 2020-12-18 PROCEDURE — 3610000054 HC PAIN LEVEL 3 BASE (NON-OR): Performed by: PHYSICAL MEDICINE & REHABILITATION

## 2020-12-18 PROCEDURE — 6360000002 HC RX W HCPCS: Performed by: PHYSICAL MEDICINE & REHABILITATION

## 2020-12-18 RX ORDER — BETAMETHASONE SODIUM PHOSPHATE AND BETAMETHASONE ACETATE 3; 3 MG/ML; MG/ML
INJECTION, SUSPENSION INTRA-ARTICULAR; INTRALESIONAL; INTRAMUSCULAR; SOFT TISSUE
Status: COMPLETED | OUTPATIENT
Start: 2020-12-18 | End: 2020-12-18

## 2020-12-18 RX ORDER — MIDAZOLAM HYDROCHLORIDE 1 MG/ML
INJECTION INTRAMUSCULAR; INTRAVENOUS
Status: COMPLETED | OUTPATIENT
Start: 2020-12-18 | End: 2020-12-18

## 2020-12-18 RX ORDER — LIDOCAINE HYDROCHLORIDE 10 MG/ML
INJECTION, SOLUTION INFILTRATION; PERINEURAL
Status: COMPLETED | OUTPATIENT
Start: 2020-12-18 | End: 2020-12-18

## 2020-12-18 RX ORDER — SODIUM CHLORIDE 9 MG/ML
INJECTION INTRAVENOUS
Status: COMPLETED | OUTPATIENT
Start: 2020-12-18 | End: 2020-12-18

## 2020-12-18 RX ORDER — FENTANYL CITRATE 50 UG/ML
INJECTION, SOLUTION INTRAMUSCULAR; INTRAVENOUS
Status: COMPLETED | OUTPATIENT
Start: 2020-12-18 | End: 2020-12-18

## 2020-12-18 ASSESSMENT — PAIN - FUNCTIONAL ASSESSMENT: PAIN_FUNCTIONAL_ASSESSMENT: 0-10

## 2020-12-18 ASSESSMENT — PAIN SCALES - GENERAL: PAINLEVEL_OUTOF10: 5

## 2020-12-18 NOTE — H&P
HISTORY AND PHYSICAL/PRE-SEDATION ASSESSMENT    Patient:  Rebekah Schroeder   :  1945  Medical Record No.:  [id number]   Date:  2020  Physician:  Jacinda Amador M.D. Facility: [address] [place] [address]    HISTORY OF PRESENT ILLNESS:                 The patient is a 69 y.o. female whom presents with neck pain. Review of the imaging and physical exam of the patient confirmed the pre-procedure diagnosis. After a thorough discussion of risks, benefits and alternatives informed consent was obtained.                 Past Medical History:   Past Medical History:   Diagnosis Date    Asthma     Atrial fibrillation (HCC)     Eosinophilia     Hemoptysis     HIGH CHOLESTEROL     Hx of blood clots     Hypertension     Irregular heart beat     Other specified gastritis without mention of hemorrhage     Palpitations     Skin cancer     basal and squamous    Type II or unspecified type diabetes mellitus without mention of complication, not stated as uncontrolled       Past Surgical History:     Past Surgical History:   Procedure Laterality Date    BRONCHOSCOPY  2016    Dr. Deric Mayorga - brushings from [address] [place] [address],[place] [address]  2018    Dr. Ricardo Garcia  2017    Dr. Gosia Zhou - sigmoid diverticulosis, polypectomies x3    COLONOSCOPY  2014    Dr. Gosia Zhou - sigmoid diverticulosis, polypectomies x3, internal hemorrhoids    COLONOSCOPY  10/10/2018    w/biopsy performed by Barb Vogel MD at [facility] N/A 2020    COLONOSCOPY DIAGNOSTIC performed by Deric Mayorga MD at [address]  2018    Dr. Camila Rosas - x3 (LIMA-LAD, L SV-D1-PLV) modified BL MAZE procedure w/obliteration of WAYNE using 45mm AtriClip    HYSTERECTOMY      INSERTABLE CARDIAC MONITOR Left 2018    Dr. Fanny Cox - [facility]# [id number] [employer]    MITRAL VALVE REPLACEMENT 08/21/2018    Dr. Brandon Cho - 27mm Medtronic Cinch tissue valve    SKIN BIOPSY      TRANSESOPHAGEAL ECHOCARDIOGRAM  08/21/2018    during CABG/MVR    TUNNELED VENOUS CATHETER PLACEMENT Left 08/23/2018    Dr. Britton Sat for HD---since removed    UPPER GASTROINTESTINAL ENDOSCOPY N/A 10/10/2018    w/biopsy performed by Jeff Aburto MD at [address]     Current Medications:   Prior to Admission medications    Medication Sig Start Date End Date Taking? Authorizing Provider   gabapentin (NEURONTIN) 300 MG capsule Take 1 capsule by mouth nightly for 90 days.  Intended supply: 30 days 12/14/20 3/14/21 Yes MUNDO Carter   blood glucose test strips (FREESTYLE LITE) strip USE TO TEST FOUR TIMES A DAY 11/27/20  Yes Ivis Snell MD   FREESTYLE LITE strip USE TO TEST FOUR TIMES A DAY 11/24/20  Yes Ivis Snell MD   albuterol sulfate  (90 Base) MCG/ACT inhaler USE 2 INHALATIONS EVERY 6 HOURS AS NEEDED FOR WHEEZING 11/17/20  Yes Ivis Snell MD   carvedilol (COREG) 25 MG tablet Take 1 tablet by mouth 2 times daily 11/12/20  Yes Amalia Keys MD   potassium chloride (KLOR-CON M) 10 MEQ extended release tablet TAKE 1 TABLET DAILY 9/23/20  Yes Ivis Snell MD   torsemide (DEMADEX) 100 MG tablet Take 1 tablet by mouth daily TAKE 1 TABLET DAILY 9/23/20  Yes Ivis Snell MD   insulin glargine (LANTUS SOLOSTAR) 100 UNIT/ML injection pen INJECT 50 UNITS IN THE MORNING AND 24 UNITS AT BEDTIME  Patient taking differently: every morning 30 units in the morning and PRN at night if needed 7/10/20  Yes Ivis Snell MD   exenatide (BYETTA 10 MCG PEN) 10 MCG/0.04ML injection Inject 0.04 mLs into the skin daily  Patient taking differently: Inject 10 mcg into the skin 2 times daily (with meals)  5/29/20  Yes Ivis Snell MD   spironolactone (ALDACTONE) 25 MG tablet TAKE 1 TABLET DAILY 5/22/20  Yes Ivis Snell MD   polyethylene glycol (GLYCOLAX) 17 GM/SCOOP powder Take 17 g by mouth daily   Yes Historical Provider, MD   omeprazole (PRILOSEC) 20 MG delayed release capsule Take 20 mg by mouth daily   Yes Historical Provider, MD   FreeStyle Lancets MISC 1 each by Does not apply route 4 times daily 4/29/20  Yes Naldo Rajput MD   ondansetron (ZOFRAN) 4 MG tablet Take 1 tablet by mouth 3 times daily as needed for Nausea or Vomiting 3/26/20  Yes Naldo Rajput MD   montelukast (SINGULAIR) 10 MG tablet TAKE 1 TABLET NIGHTLY 2/26/20  Yes Lee Bran MD   Blood Glucose Monitoring Suppl (EMBRACE PRO GLUCOSE METER) GAETANO 1 Device by Does not apply route 4 times daily 2/4/20  Yes Naldo Rajput MD   HUMALOG KWIKPEN 100 UNIT/ML SOPN TAKE AS INSTRUCTED BY YOUR PRESCRIBER  Patient taking differently:  Only if high blood sugar-has not been using 12/30/19  Yes Naldo Rajput MD   aspirin 81 MG chewable tablet Take 1 tablet by mouth daily 6/7/19  Yes Naldo Rajput MD   BD PEN NEEDLE JANNET U/F 32G X 4 MM MISC USE 1 PEN NEEDLE FOUR TIMES A DAY 3/22/19  Yes Naldo Rajput MD   vitamin B-12 500 MCG tablet Take 1 tablet by mouth daily 10/12/18  Yes Fan Olguin MD   Blood Glucose Monitoring Suppl (FREESTYLE LITE) GAETANO 1 Device by Does not apply route 4 times daily Patient to check blood sugar 4 times a day and PRN, she is treated with multiple daily injections of insulin that require correction dosing ;A1C 8.1 ; ICD code-E11.65 ,Z79.4  Patient taking differently: 1 Device by Does not apply route 2 times daily  9/4/18  Yes SOLEDAD Rodrigez - CNP   albuterol (PROVENTIL) (2.5 MG/3ML) 0.083% nebulizer solution Take 3 mLs by nebulization every 6 hours as needed for Wheezing 6/2/20   Naldo Rajput MD   warfarin (COUMADIN) 5 MG tablet TAKE 1 TABLET DAILY  Patient taking differently: Take 5 mg by mouth daily Managed by PCP 2/26/20   Lee Bran MD   nystatin (MYCOSTATIN) 426444 UNIT/ML suspension TAKE ONE TEASPOONFUL BY MOUTH FOUR TIMES A DAY FOR 5 DAYS 11/20/19 Lima Crews MD     Allergies:  Afbgjaow-kifagnj-[id number] [fluocinolone], Ciprofloxacin, Diovan [valsartan], Flagyl [metronidazole], Metformin and related [metformin and related], Benazepril, Morphine, Saxagliptin, and Levaquin [levofloxacin]  Social History:    reports that she has never smoked. She has never used smokeless tobacco. She reports that she does not drink alcohol or use drugs. Family History:   Family History   Problem Relation Age of Onset    Cancer Father     Asthma Mother     Hypertension Mother     Heart Disease Mother     High Blood Pressure Mother        Vitals: Blood pressure 110/68, pulse 93, resp. rate 20, height 5' 8\" (1.727 m), weight 211 lb (95.7 kg), SpO2 97 %, not currently breastfeeding. PHYSICAL EXAM:including affected areas  Cardiovascular: Normal rate, regular rhythm, normal heart sounds. Pulmonary/Chest: No wheezes. Extremities: Moves all extremities equally  Cervical and Lumbar Spine: Painful range of motion, no midline tenderness       Diagnosis:Cervical radiculitis  M54.12   M47.812   M50.30    Plan: Proceed with planned procedure      ASA CLASS:         []   I. Normal, healthy adult           [x]   II.  Mild systemic disease            []   III. Severe systemic disease      Mallampati: Mallampati Class II - (soft palate, fauces & uvula are visible)      Sedation plan:   [x]  Local              []  Minimal                  []  General anesthesia    Patient's condition acceptable for planned procedure/sedation. Post Procedure Plan   Return to same level of care   ______________________     The patient was counseled at length about the risks of uma Covid-19 in the stacie-operative and post-operative states including the recovery window of their procedure. The patient was made aware that uma Covid-19 after a surgical procedure may worsen their prognosis for recovering from the virus and lend to a higher morbidity and or mortality risk.   The patient was given the options of postponing their procedure. All of the risks, benefits, and alternatives were discussed. The patient does wish to proceed with the procedure. The risks and benefits as well as alternatives to the procedure have been discussed with the patient and or family. The patient and or next of kin understands and agrees to proceed.     Leon Soulier, M.D.

## 2020-12-18 NOTE — PROGRESS NOTES
Teaching / education initiated regarding perioperative experience, expectations, and pain management during stay. Patient verbalized understanding.   Do Cazares RN

## 2020-12-18 NOTE — OP NOTE
Patient:  Mariposa Ascencio  YOB: 1945  Medical Record #:  [id number]   Place: [address] [place] [address]  Date:  12/18/2020   Physician:  Kelly Quintero MD, ADONAY    Procedure:  Cervical Epidural Interlaminar Steroid Injection  C6 - C7    Pre-Procedure Diagnosis:  Cervical radiculopathy    Post-Procedure Diagnosis: Same    Sedation: Local with 1% Lidocaine 3 ml and 1 mg of IV Versed and 25 mcg of IV Fentanyl    EBL: None    Complications: None    Procedure Summary:    The patient was seen in the office for complaints of neck pain. Review of the imaging and physical exam of the patient confirmed the pre-procedure diagnosis. After a thorough discussion of risks, benefits and alternatives informed consent was obtained. The patient was brought to the procedure suite and placed in the prone position. The skin overlying the cervical spine was prepped and draped in the usual sterile fashion. Using fluoroscopic guidance, the C6 - C7 interlaminar space was identified. Through anesthetized skin, a 22 gauge Touhy needle was advanced into the epidural space using continuous loss of resistance to saline technique. Isovue M300 was instilled and an epidurogram was noted without evidence of vascular or intrathecal spread. Following which, 5 ml of a solution containing preservative free normal saline and 15 mg of betamethasone was instilled. The needle was removed and a band-aid applied. The patient was transferred to the post-operative suite in stable condition.

## 2020-12-21 ENCOUNTER — OFFICE VISIT (OUTPATIENT)
Dept: FAMILY MEDICINE CLINIC | Age: 75
End: 2020-12-21
Payer: MEDICARE

## 2020-12-21 VITALS
TEMPERATURE: 97.2 F | WEIGHT: 218.38 LBS | OXYGEN SATURATION: 98 % | SYSTOLIC BLOOD PRESSURE: 114 MMHG | HEART RATE: 68 BPM | RESPIRATION RATE: 16 BRPM | DIASTOLIC BLOOD PRESSURE: 76 MMHG | BODY MASS INDEX: 33.2 KG/M2

## 2020-12-21 PROCEDURE — 99214 OFFICE O/P EST MOD 30 MIN: CPT | Performed by: FAMILY MEDICINE

## 2020-12-21 RX ORDER — OXYCODONE HYDROCHLORIDE 5 MG/1
5 TABLET ORAL EVERY 6 HOURS PRN
Qty: 100 TABLET | Refills: 0 | Status: SHIPPED | OUTPATIENT
Start: 2020-12-21 | End: 2021-03-23 | Stop reason: SDUPTHER

## 2020-12-21 RX ORDER — METOLAZONE 2.5 MG/1
2.5 TABLET ORAL DAILY
Qty: 30 TABLET | Refills: 0 | Status: ON HOLD
Start: 2020-12-21 | End: 2021-03-10 | Stop reason: HOSPADM

## 2020-12-21 NOTE — PROGRESS NOTES
lipid rich plaque    Nonrheumatic mitral valve regurgitation    S/P CABG x 3    Goiter    Primary osteoarthritis of both knees    Splenic infarct    Obesity, Class I, BMI 30-34.9    Chronic combined systolic (congestive) and diastolic (congestive) heart failure (HCC)    Thoracic degenerative disc disease    Persistent atrial fibrillation (HCC)    S/P MVR (mitral valve repair)    Ischemic cardiomyopathy    Occlusion and stenosis of bilateral carotid arteries    Pneumatosis intestinalis    Nonrheumatic aortic valve stenosis    DVT (deep vein thrombosis) in pregnancy       Outpatient Medications Marked as Taking for the 12/21/20 encounter (Office Visit) with Kristen Peace MD   Medication Sig Dispense Refill    gabapentin (NEURONTIN) 300 MG capsule Take 1 capsule by mouth nightly for 90 days.  Intended supply: 30 days 90 capsule 0    blood glucose test strips (FREESTYLE LITE) strip USE TO TEST FOUR TIMES A  strip 4    FREESTYLE LITE strip USE TO TEST FOUR TIMES A  strip 0    albuterol sulfate  (90 Base) MCG/ACT inhaler USE 2 INHALATIONS EVERY 6 HOURS AS NEEDED FOR WHEEZING 25.5 g 2    carvedilol (COREG) 25 MG tablet Take 1 tablet by mouth 2 times daily 270 tablet 3    potassium chloride (KLOR-CON M) 10 MEQ extended release tablet TAKE 1 TABLET DAILY 90 tablet 1    torsemide (DEMADEX) 100 MG tablet Take 1 tablet by mouth daily TAKE 1 TABLET DAILY 90 tablet 1    insulin glargine (LANTUS SOLOSTAR) 100 UNIT/ML injection pen INJECT 50 UNITS IN THE MORNING AND 24 UNITS AT BEDTIME (Patient taking differently: every morning 30 units in the morning and PRN at night if needed) 90 mL 1    albuterol (PROVENTIL) (2.5 MG/3ML) 0.083% nebulizer solution Take 3 mLs by nebulization every 6 hours as needed for Wheezing 120 each 3    exenatide (BYETTA 10 MCG PEN) 10 MCG/0.04ML injection Inject 0.04 mLs into the skin daily (Patient taking differently: Inject 10 mcg into the skin 2 times home 12/7/15    Metformin And Related [Metformin And Related] Shortness Of Breath    Benazepril      Other reaction(s): Not Recorded    Morphine      Bad reaction. \"makes her feel horrible\".  Saxagliptin      Other reaction(s): Not Recorded    Levaquin [Levofloxacin] Rash       Social History     Tobacco Use    Smoking status: Never Smoker    Smokeless tobacco: Never Used   Substance Use Topics    Alcohol use: No       /76 (Site: Left Upper Arm, Position: Sitting, Cuff Size: Large Adult)   Pulse 68   Temp 97.2 °F (36.2 °C) (Infrared)   Resp 16   Wt 218 lb 6 oz (99.1 kg)   SpO2 98%   BMI 33.20 kg/m²     Objective:   Physical Exam  Constitutional:       General: She is not in acute distress. Appearance: She is well-developed. Neck:      Vascular: No carotid bruit. Cardiovascular:      Rate and Rhythm: Tachycardia present. Rhythm regularly irregular. Pulses:           Dorsalis pedis pulses are 2+ on the right side and 2+ on the left side. Posterior tibial pulses are 2+ on the right side and 2+ on the left side. Heart sounds: Murmur present. Systolic (Right sternal border) murmur present with a grade of 2/6. No friction rub. No gallop. Pulmonary:      Effort: Pulmonary effort is normal.      Breath sounds: Normal breath sounds. Abdominal:      General: Bowel sounds are normal. There is distension. Palpations: Abdomen is soft. Tenderness: There is abdominal tenderness in the epigastric area and periumbilical area. There is no right CVA tenderness, left CVA tenderness or guarding. Hernia: No hernia is present. Musculoskeletal: Normal range of motion. General: No tenderness. Right lower leg: Edema (3 plus edema both lower extremities up till knee) present. Left lower leg: Edema present. Right foot: Normal.      Left foot: Normal.   Neurological:      General: No focal deficit present.       Mental Status: She is alert and oriented to person, place, and time. Sensory: Sensation is intact. Motor: Motor function is intact. Deep Tendon Reflexes: Reflexes are normal and symmetric. Comments: 12 point monofilament test normal    Psychiatric:         Behavior: Behavior is cooperative. Assessment:      Marc Salmeron was seen today for follow-up, diabetes and hypertension. Diagnoses and all orders for this visit:    Type 2 diabetes mellitus with hyperglycemia, with long-term current use of insulin ([facility] [address]  -     Comprehensive Metabolic Panel - Vitros; Future  -     Lipid Panel; Future  -     Hemoglobin A1C; Future    Acute on chronic right-sided congestive heart failure (HCC)    Eosinophilic gastritis  -     CBC WITH AUTO DIFFERENTIAL; Future    Primary osteoarthritis of both knees  -     oxyCODONE (ROXICODONE) 5 MG immediate release tablet; Take 1 tablet by mouth every 6 hours as needed for Pain for up to 30 days. Pneumatosis intestinalis    Other orders  -     metOLazone (ZAROXOLYN) 2.5 MG tablet; Take 1 tablet by mouth daily    OARRS report checked          Plan:      Laboratory profile reviewed with patient and     In regards to Coumadin she was instructed to restart her normal dose and have repeat INR done next week    Obviously concerned with acute on chronic congestive heart failure. She is not able to tolerate an ACE or an ARB medication. Start Zaroxolyn daily but advised patient and  that we would need to watch her weight very closely over the next week. If her weight declines significantly the Zaroxolyn medication should be discontinued. There can call me back in 1 week with a weight update. Obviously go ahead and maintain a low-salt diet and avoid anti-inflammatory medications. Maintain current diabetic management    RTC 3 months and keep appoint with cardiology next month    Medical decision making of moderate complexity.       Please note that this chart was generated using Dragon dictation software. Although every effort was made to ensure the accuracy of this automated transcription, some errors in transcription may have occurred.

## 2020-12-23 ENCOUNTER — HOSPITAL ENCOUNTER (OUTPATIENT)
Age: 75
Discharge: HOME | End: 2020-12-23
Payer: MEDICARE

## 2020-12-23 LAB
A/G RATIO: 1 (ref 1.1–2.2)
ALBUMIN SERPL-MCNC: 2.8 G/DL (ref 3.4–5)
ALP BLD-CCNC: 116 U/L (ref 40–129)
ALT SERPL-CCNC: 15 U/L (ref 10–40)
ANION GAP SERPL CALCULATED.3IONS-SCNC: 9 MMOL/L (ref 3–16)
AST SERPL-CCNC: 16 U/L (ref 15–37)
BASOPHILS ABSOLUTE: 0 K/UL (ref 0–0.2)
BASOPHILS RELATIVE PERCENT: 0.7 %
BILIRUB SERPL-MCNC: 0.3 MG/DL (ref 0–1)
BUN BLDV-MCNC: 31 MG/DL (ref 7–20)
CALCIUM SERPL-MCNC: 8.6 MG/DL (ref 8.3–10.6)
CHLORIDE BLD-SCNC: 103 MMOL/L (ref 99–110)
CHOLESTEROL, TOTAL: 149 MG/DL (ref 0–199)
CO2: 31 MMOL/L (ref 21–32)
CREAT SERPL-MCNC: 1.3 MG/DL (ref 0.6–1.2)
EOSINOPHILS ABSOLUTE: 0.4 K/UL (ref 0–0.6)
EOSINOPHILS RELATIVE PERCENT: 8.6 %
ESTIMATED AVERAGE GLUCOSE: 128.4 MG/DL
GFR AFRICAN AMERICAN: 48
GFR NON-AFRICAN AMERICAN: 40
GLOBULIN: 2.8 G/DL
GLUCOSE BLD-MCNC: 75 MG/DL (ref 70–99)
HBA1C MFR BLD: 6.1 %
HCT VFR BLD CALC: 34.6 % (ref 36–48)
HDLC SERPL-MCNC: 40 MG/DL (ref 40–60)
HEMOGLOBIN: 11.1 G/DL (ref 12–16)
LDL CHOLESTEROL CALCULATED: 97 MG/DL
LYMPHOCYTES ABSOLUTE: 1 K/UL (ref 1–5.1)
LYMPHOCYTES RELATIVE PERCENT: 20.1 %
MCH RBC QN AUTO: 28 PG (ref 26–34)
MCHC RBC AUTO-ENTMCNC: 32.1 G/DL (ref 31–36)
MCV RBC AUTO: 87.2 FL (ref 80–100)
MONOCYTES ABSOLUTE: 0.6 K/UL (ref 0–1.3)
MONOCYTES RELATIVE PERCENT: 11.7 %
NEUTROPHILS ABSOLUTE: 2.8 K/UL (ref 1.7–7.7)
NEUTROPHILS RELATIVE PERCENT: 58.9 %
PDW BLD-RTO: 17.1 % (ref 12.4–15.4)
PLATELET # BLD: 199 K/UL (ref 135–450)
PMV BLD AUTO: 9.2 FL (ref 5–10.5)
POTASSIUM SERPL-SCNC: 3.6 MMOL/L (ref 3.5–5.1)
RBC # BLD: 3.96 M/UL (ref 4–5.2)
SODIUM BLD-SCNC: 143 MMOL/L (ref 136–145)
TOTAL PROTEIN: 5.6 G/DL (ref 6.4–8.2)
TRIGL SERPL-MCNC: 60 MG/DL (ref 0–150)
VLDLC SERPL CALC-MCNC: 12 MG/DL
WBC # BLD: 4.8 K/UL (ref 4–11)

## 2020-12-23 PROCEDURE — 80053 COMPREHEN METABOLIC PANEL: CPT

## 2020-12-23 PROCEDURE — 80061 LIPID PANEL: CPT

## 2020-12-23 PROCEDURE — 85025 COMPLETE CBC W/AUTO DIFF WBC: CPT

## 2020-12-23 PROCEDURE — 36415 COLL VENOUS BLD VENIPUNCTURE: CPT

## 2020-12-23 PROCEDURE — 83036 HEMOGLOBIN GLYCOSYLATED A1C: CPT

## 2020-12-28 ENCOUNTER — TELEPHONE (OUTPATIENT)
Dept: FAMILY MEDICINE CLINIC | Age: 75
End: 2020-12-28

## 2020-12-28 ENCOUNTER — ANTI-COAG VISIT (OUTPATIENT)
Dept: FAMILY MEDICINE CLINIC | Age: 75
End: 2020-12-28

## 2020-12-28 ENCOUNTER — OFFICE VISIT (OUTPATIENT)
Dept: ORTHOPEDIC SURGERY | Age: 75
End: 2020-12-28
Payer: MEDICARE

## 2020-12-28 VITALS — WEIGHT: 218.48 LBS | TEMPERATURE: 97 F | HEIGHT: 68 IN | BODY MASS INDEX: 33.11 KG/M2

## 2020-12-28 PROCEDURE — 99213 OFFICE O/P EST LOW 20 MIN: CPT | Performed by: PHYSICIAN ASSISTANT

## 2020-12-28 NOTE — PROGRESS NOTES
medical, surgical, social and family history reviewed with the patient.      Past Medical History:   Past Medical History:   Diagnosis Date    Asthma     Atrial fibrillation (HCC)     Eosinophilia     Hemoptysis     HIGH CHOLESTEROL     Hx of blood clots     Hypertension     Irregular heart beat     Other specified gastritis without mention of hemorrhage     Palpitations     Skin cancer     basal and squamous    Type II or unspecified type diabetes mellitus without mention of complication, not stated as uncontrolled       Past Surgical History:     Past Surgical History:   Procedure Laterality Date    BRONCHOSCOPY  07/18/2016    Dr. Iveth Yoo - brush[place] from [address] [place] [address] [place]  08/16/2018    Dr. Qing Vergara  02/07/2017    Dr. Analia Knapp - sigmoid diverticulosis, polypectomies x3    COLONOSCOPY  01/17/2014    Dr. Analia Knapp - sigmoid diverticulosis, polypectomies x3, internal hemorrhoids    COLONOSCOPY  10/10/2018    w/biopsy performed by Love Sears MD at [facility] N/A 8/7/2020    COLONOSCOPY DIAGNOSTIC performed by Iveth Yoo MD at [address]  08/21/2018    Dr. Trinh Barron - x3 (LIMA-LAD, L SV-D1-PLV) modified BL MAZE procedure w/obliteration of WAYNE using 45mm AtriClip    HYSTERECTOMY      INSERTABLE CARDIAC MONITOR Left 08/16/2018    Dr. Gennette Landau - Fatimah Chain SN# [id number] [employer]    MITRAL VALVE REPLACEMENT  08/21/2018    Dr. Trinh Barron - 27mm Medtronic Cinch tissue valve    PAIN MANAGEMENT PROCEDURE N/A 12/18/2020    C6-C7 MIDLINE  EPIDURAL STEROID INJECTION WITH FLUOROSCOPY performed by Renita Farris MD at [address]  TRANSESOPHAGEAL ECHOCARDIOGRAM  08/21/2018    during CABG/MVR    TUNNELED VENOUS CATHETER PLACEMENT Left 08/23/2018    Dr. Korina Sims for HD---since removed    UPPER GASTROINTESTINAL ENDOSCOPY N/A 10/10/2018    w/biopsy performed by Julisa Woody MD at [address] [facility] ENDOSCOPY     Current Medications:     Current Outpatient Medications:     oxyCODONE (ROXICODONE) 5 MG immediate release tablet, Take 1 tablet by mouth every 6 hours as needed for Pain for up to 30 days. , Disp: 100 tablet, Rfl: 0    metOLazone (ZAROXOLYN) 2.5 MG tablet, Take 1 tablet by mouth daily, Disp: 30 tablet, Rfl: 0    gabapentin (NEURONTIN) 300 MG capsule, Take 1 capsule by mouth nightly for 90 days.  Intended supply: 30 days, Disp: 90 capsule, Rfl: 0    blood glucose test strips (FREESTYLE LITE) strip, USE TO TEST FOUR TIMES A DAY, Disp: 200 strip, Rfl: 4    FREESTYLE LITE strip, USE TO TEST FOUR TIMES A DAY, Disp: 200 strip, Rfl: 0    albuterol sulfate  (90 Base) MCG/ACT inhaler, USE 2 INHALATIONS EVERY 6 HOURS AS NEEDED FOR WHEEZING, Disp: 25.5 g, Rfl: 2    carvedilol (COREG) 25 MG tablet, Take 1 tablet by mouth 2 times daily, Disp: 270 tablet, Rfl: 3    potassium chloride (KLOR-CON M) 10 MEQ extended release tablet, TAKE 1 TABLET DAILY, Disp: 90 tablet, Rfl: 1    torsemide (DEMADEX) 100 MG tablet, Take 1 tablet by mouth daily TAKE 1 TABLET DAILY, Disp: 90 tablet, Rfl: 1    insulin glargine (LANTUS SOLOSTAR) 100 UNIT/ML injection pen, INJECT 50 UNITS IN THE MORNING AND 24 UNITS AT BEDTIME (Patient taking differently: every morning 30 units in the morning and PRN at night if needed), Disp: 90 mL, Rfl: 1    albuterol (PROVENTIL) (2.5 MG/3ML) 0.083% nebulizer solution, Take 3 mLs by nebulization every 6 hours as needed for Wheezing, Disp: 120 each, Rfl: 3    exenatide (BYETTA 10 MCG PEN) 10 MCG/0.04ML injection, Inject 0.04 mLs into the skin daily (Patient taking differently: Inject 10 mcg into the skin 2 times daily (with meals) ), Disp: 3 pen, Rfl: 3    spironolactone (ALDACTONE) 25 MG tablet, TAKE 1 TABLET DAILY, Disp: 90 tablet, Rfl: 3    polyethylene glycol (GLYCOLAX) 17 GM/SCOOP powder, Take 17 g by mouth daily, Disp: , Rfl:     omeprazole (PRILOSEC) 20 MG delayed release capsule, Take 20 mg by mouth daily, Disp: , Rfl:     FreeStyle Lancets MISC, 1 each by Does not apply route 4 times daily, Disp: 200 each, Rfl: 5    ondansetron (ZOFRAN) 4 MG tablet, Take 1 tablet by mouth 3 times daily as needed for Nausea or Vomiting, Disp: 30 tablet, Rfl: 0    warfarin (COUMADIN) 5 MG tablet, TAKE 1 TABLET DAILY (Patient taking differently: Take 5 mg by mouth daily Managed by PCP), Disp: 100 tablet, Rfl: 4    montelukast (SINGULAIR) 10 MG tablet, TAKE 1 TABLET NIGHTLY, Disp: 90 tablet, Rfl: 4    Blood Glucose Monitoring Suppl (EMBRACE PRO GLUCOSE METER) GAETANO, 1 Device by Does not apply route 4 times daily, Disp: 1 Device, Rfl: 0    HUMALOG KWIKPEN 100 UNIT/ML SOPN, TAKE AS INSTRUCTED BY YOUR PRESCRIBER (Patient taking differently: Only if high blood sugar-has not been using), Disp: 15 mL, Rfl: 8    nystatin (MYCOSTATIN) 631421 UNIT/ML suspension, TAKE ONE TEASPOONFUL BY MOUTH FOUR TIMES A DAY FOR 5 DAYS, Disp: 1 Bottle, Rfl: 2    aspirin 81 MG chewable tablet, Take 1 tablet by mouth daily, Disp: 30 tablet, Rfl: 3    BD PEN NEEDLE JANNET U/F 32G X 4 MM MISC, USE 1 PEN NEEDLE FOUR TIMES A DAY, Disp: 360 each, Rfl: 3    vitamin B-12 500 MCG tablet, Take 1 tablet by mouth daily, Disp: 30 tablet, Rfl: 3    Blood Glucose Monitoring Suppl (FREESTYLE LITE) GAETANO, 1 Device by Does not apply route 4 times daily Patient to check blood sugar 4 times a day and PRN, she is treated with multiple daily injections of insulin that require correction dosing ;A1C 8.1 ; ICD code-E11.65 ,Z79.4 (Patient taking differently: 1 Device by Does not apply route 2 times daily ), Disp: 1 Device, Rfl: 0  Allergies:  Sorpjfld-[id number] [fluocinolone], Ciprofloxacin, Diovan [valsartan], Flagyl [metronidazole], Metformin and related [metformin and related], Benazepril, Morphine, Saxagliptin, and Levaquin [levofloxacin]  Social History:    reports that she has never smoked.  She has never used smokeless tobacco. She reports that she does not drink alcohol or use drugs. Family History:   Family History   Problem Relation Age of Onset    Cancer Father     Asthma Mother     Hypertension Mother     Heart Disease Mother     High Blood Pressure Mother        REVIEW OF SYSTEMS:   CONSTITUTIONAL: Denies unexplained weight loss, fevers, chills or fatigue  NEUROLOGICAL: Denies unsteady gait or progressive weakness  MUSCULOSKELETAL: Denies joint swelling or redness  GI: Denies nausea, vomiting, diarrhea   : Denies bowel or bladder issues       PHYSICAL EXAM:    Vitals: Temperature 97 °F (36.1 °C), temperature source Infrared, height 5' 7.99\" (1.727 m), weight 218 lb 7.6 oz (99.1 kg), not currently breastfeeding. GENERAL EXAM:  · General Apparence: Patient is adequately groomed with no evidence of malnutrition. · Psychiatric: Orientation: The patient is oriented to time, place and person. The patient's mood and affect are appropriate   · Vascular: Examination reveals no swelling and palpation reveals no tenderness in upper or lower extremities. Good capillary refill. · The lymphatic examination of the neck, axillae and groin reveals all areas to be without enlargement or induration. · Sensation is intact without deficit in the upper and lower extremities to light touch. · Coordination of the upper and lower extremities are normal.    CERVICAL/THORACIC EXAMINATION:  · Inspection: Local inspection shows no step-off or bruising. Cervical alignment is normal. No instability is noted. · Palpation and Percussion: No evidence of tenderness at the midline. Paraspinal tenderness is present with moderate to severe thoracic spine tenderness around T10-T12. . There is no paraspinal spasm.    Skin:There are no rashes, ulcerations or lesions  · Range of Motion:  limited by 50% in all planes due to pain   · Strength: 5/5 bilateral upper extremities  · Special Tests:   Spurling's and Hampton's are negative bilaterally. Sharp and Impingement tests are negative bilaterally. · Skin:There are no rashes, ulcerations or lesions in right & left upper extremities. · Reflexes: Bilaterally triceps, biceps and brachioradialis are 1+. Clonus absent bilaterally at the feet. No pathological reflexes are noted. · Gait & station: normal, patient ambulates without assistance and no ataxia  · Additional Examinations:  · RIGHT UPPER EXTREMITY:  Inspection/examination of the right upper extremity does not show any tenderness, deformity or injury. Range of motion is unremarkable and pain-free. There is no gross instability. There are no rashes, ulcerations or lesions. Strength and tone are normal. No atrophy or abnormal movements are noted. · LEFT UPPER EXTREMITY: Inspection/examination of the left upper extremity does not show any tenderness, deformity or injury. Range of motion is unremarkable and pain-free. There is no gross instability. There are no rashes, ulcerations or lesions. Strength and tone are normal. No atrophy or abnormal movements are noted. · RIGHT LOWER EXTREMITY: Inspection/examination of the right lower extremity does not show any tenderness, deformity or injury. Range of motion is normal and pain-free. There is no gross instability. There are no rashes, ulcerations or lesions. Strength and tone are normal. No atrophy or abnormal movements are noted. · LEFT LOWER EXTREMITY:  Inspection/examination of the left lower extremity does not show any tenderness, deformity or injury. Range of motion is normal and pain-free. There is no gross instability. There are no rashes, ulcerations or lesions. Strength and tone are normal. No atrophy or abnormal movements are noted.       Diagnostic Testing:    CT Scan of the Cervical Spine from 11/12/20:  FINDINGS:   Vertebral body height and alignment are maintained.       There is no evidence of fracture or other acute osseous abnormality.       There are significant multilevel discogenic changes which are incompletely   evaluated on CT imaging.       At C3-C4 there is a moderate posterior disc osteophyte complex with minimal   central stenosis and no definite osseous foraminal stenosis.       At C4-C5 there is a moderate posterior disc osteophyte complex with minimal   central stenosis and no definite osseous foraminal stenosis.       At C6-C7  there is a small to moderate posterior disc osteophyte complex with   no significant osseous central or foraminal stenosis.       At C6-C7 there is a moderate to large posterior disc osteophyte complex which   causes mild central stenosis and mild-to-moderate left neural foraminal   stenosis.         Impression   Multilevel degenerative changes as above.  Discogenic changes are   incompletely evaluated by CT imaging.  Further evaluation with MRI may be   helpful for better characterization if clinically indicated.          Results for orders placed or performed during the hospital encounter of 12/23/20   Lipid Panel   Result Value Ref Range    Cholesterol, Total 149 0 - 199 mg/dL    Triglycerides 60 0 - 150 mg/dL    HDL 40 40 - 60 mg/dL    LDL Calculated 97 <100 mg/dL    VLDL Cholesterol Calculated 12 Not Established mg/dL   Hemoglobin A1C   Result Value Ref Range    Hemoglobin A1C 6.1 See comment %    eAG 128.4 mg/dL   CBC WITH AUTO DIFFERENTIAL   Result Value Ref Range    WBC 4.8 4.0 - 11.0 K/uL    RBC 3.96 (L) 4.00 - 5.20 M/uL    Hemoglobin 11.1 (L) 12.0 - 16.0 g/dL    Hematocrit 34.6 (L) 36.0 - 48.0 %    MCV 87.2 80.0 - 100.0 fL    MCH 28.0 26.0 - 34.0 pg    MCHC 32.1 31.0 - 36.0 g/dL    RDW 17.1 (H) 12.4 - 15.4 %    Platelets 095 506 - 817 K/uL    MPV 9.2 5.0 - 10.5 fL    Neutrophils % 58.9 %    Lymphocytes % 20.1 %    Monocytes % 11.7 %    Eosinophils % 8.6 %    Basophils % 0.7 %    Neutrophils Absolute 2.8 1.7 - 7.7 K/uL    Lymphocytes Absolute 1.0 1.0 - 5.1 K/uL    Monocytes Absolute 0.6 0.0 - 1.3 K/uL    Eosinophils Absolute 0.4 0.0 - 0.6 K/uL    Basophils Absolute 0.0 0.0 - 0.2 K/uL   Comprehensive Metabolic Panel   Result Value Ref Range    Sodium 143 136 - 145 mmol/L    Potassium 3.6 3.5 - 5.1 mmol/L    Chloride 103 99 - 110 mmol/L    CO2 31 21 - 32 mmol/L    Anion Gap 9 3 - 16    Glucose 75 70 - 99 mg/dL    BUN 31 (H) 7 - 20 mg/dL    CREATININE 1.3 (H) 0.6 - 1.2 mg/dL    GFR Non- 40 (A) >60    GFR  48 (A) >60    Calcium 8.6 8.3 - 10.6 mg/dL    Total Protein 5.6 (L) 6.4 - 8.2 g/dL    Alb 2.8 (L) 3.4 - 5.0 g/dL    Albumin/Globulin Ratio 1.0 (L) 1.1 - 2.2    Total Bilirubin 0.3 0.0 - 1.0 mg/dL    Alkaline Phosphatase 116 40 - 129 U/L    ALT 15 10 - 40 U/L    AST 16 15 - 37 U/L    Globulin 2.8 g/dL     Impression:       1. Spondylosis without myelopathy or radiculopathy, thoracic region    2. DDD (degenerative disc disease), thoracic    3. Cervical radiculopathy    4. Foraminal stenosis of cervical region    5. Cervical spondylosis without myelopathy    6. DDD (degenerative disc disease), cervical        Plan:  Clinical Course: Above diagnoses are worsening diagnoses 1 and 2 ; unchanged diagnoses 3-6. I discussed the diagnosis and the treatment options with Estela Hardwick today. In Summary:  The various treatment options were outlined and discussed with Estela Hardwick including:  Conservative care options: physical therapy, ice, medications, bracing, and activity modification. The indications for therapeutic injections. The indications for additional imaging/laboratory studies. The indications for (possible future) interventions. After considering the various options discussed, Estela Hardwick elected to pursue a course of treatment that includes the followin. Medications:  No further recommendations for new medications. The patient does have a prescription of oxycodone from her primary care physician.     2. PT:  Encouraged to continue with Home exercise program.    3. Further studies: CT Scan of the Thoracic Spine without contrast.      4. Interventional:  After further imaging is obtained, interventional options will be reviewed and recommended. 5. Follow up:  1-2 weeks      Elba Leija was instructed to call the office if her symptoms worsen or if new symptoms appear prior to the next scheduled visit. She is specifically instructed to contact the office between now & her scheduled appointment if she has concerns related to her condition or if she needs assistance in scheduling the above tests. She is welcome to call for an appointment sooner if she has any additional concerns or questions. BRITTNEE Beckman, PAAlmaC  Board Certified by the [employer] on Certification of Physician Assistants  Sameera Ann 59  Partner of [facility] ([facility])         This dictation was performed with a verbal recognition program [facility]) and it was checked for errors. It is possible that there are still dictated errors within this office note. If so, please bring any errors to my attention for an addendum. All efforts were made to ensure that this office note is accurate.

## 2020-12-28 NOTE — TELEPHONE ENCOUNTER
Mario Dia is calling with [employer] with a out of range INR      INR.  1.5 that was 12/24/20      Please advise

## 2020-12-30 ENCOUNTER — TELEPHONE (OUTPATIENT)
Dept: ORTHOPEDIC SURGERY | Age: 75
End: 2020-12-30

## 2020-12-30 NOTE — TELEPHONE ENCOUNTER
S/W PATIENT regarding CT TSP approval and authorization being valid until 6/27/2021. Patient was instructed to call [facility] to schedule CT LSP, then contact our office for follow up appointment. CT LSP results will not be given over the phone or via [employer]. Patient currently has a follow up appointment schedule for 1/11/2021. Advised to contact the office if needing to reschedule this to accommodate CT scan. Patient voiced understanding of CT results not being given over the phone.

## 2021-01-04 ENCOUNTER — OFFICE VISIT (OUTPATIENT)
Dept: CARDIOLOGY CLINIC | Age: 76
End: 2021-01-04
Payer: MEDICARE

## 2021-01-04 VITALS
DIASTOLIC BLOOD PRESSURE: 64 MMHG | WEIGHT: 208 LBS | HEIGHT: 68 IN | SYSTOLIC BLOOD PRESSURE: 102 MMHG | HEART RATE: 100 BPM | BODY MASS INDEX: 31.52 KG/M2 | OXYGEN SATURATION: 94 %

## 2021-01-04 DIAGNOSIS — R06.02 SHORTNESS OF BREATH: ICD-10-CM

## 2021-01-04 DIAGNOSIS — I35.0 NONRHEUMATIC AORTIC VALVE STENOSIS: ICD-10-CM

## 2021-01-04 DIAGNOSIS — I25.10 CORONARY ARTERY DISEASE INVOLVING NATIVE CORONARY ARTERY OF NATIVE HEART WITHOUT ANGINA PECTORIS: ICD-10-CM

## 2021-01-04 DIAGNOSIS — I50.22 CHRONIC SYSTOLIC HEART FAILURE (HCC): Primary | ICD-10-CM

## 2021-01-04 DIAGNOSIS — I48.0 PAROXYSMAL ATRIAL FIBRILLATION (HCC): ICD-10-CM

## 2021-01-04 PROCEDURE — 99214 OFFICE O/P EST MOD 30 MIN: CPT | Performed by: INTERNAL MEDICINE

## 2021-01-04 RX ORDER — METHOCARBAMOL 750 MG/1
750 TABLET, FILM COATED ORAL PRN
Status: ON HOLD | COMMUNITY
End: 2021-03-10 | Stop reason: HOSPADM

## 2021-01-04 RX ORDER — PREDNISONE 10 MG/1
10 TABLET ORAL PRN
Status: ON HOLD | COMMUNITY
End: 2021-02-26

## 2021-01-04 RX ORDER — SPIRONOLACTONE 50 MG/1
TABLET, FILM COATED ORAL
Qty: 90 TABLET | Refills: 3 | Status: ON HOLD
Start: 2021-01-04 | End: 2021-03-10 | Stop reason: HOSPADM

## 2021-01-04 NOTE — PROGRESS NOTES
[address]  Advanced CHF/Pulmonary Hypertension   Cardiac Evaluation      Marie Bernal  YOB: 1945    Date of Visit:  1/4/21    Chief Complaint   Patient presents with    Congestive Heart Failure    Shortness of Breath    Foot Swelling      History of Present Illness:  Marie Bernal is a 77 y.o. female who presents from referral from Dr. Magda Ozuna for consultation and management of worsening heart failure; JUDE on 10/21/20 showed EF 25%. ECHO 9/15/2020 with EF 25-30%. She saw Dr. Magda Ozuna 11/12/2020 c/o LEVIN; she sees him for AS. 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 She's had two DCCV's, ultimately unsuccessful. Today, Ms Giselle Ozuna states she could not tolerate Valsartan because of dizziness. She took 1 dose then stopped taking it. Zac Cortes is here in a wheelchair today. She is with her . Zac Cortes had an injection in her back in November. She states she it gave her some relief for a short time. She has edema in her LE's. Allergies   Allergen Reactions    Ogcfmbup-Dmhrjlb-Yruzcs [Fluocinolone] Shortness Of Breath    Ciprofloxacin Shortness Of Breath    Diovan [Valsartan] Shortness Of Breath    Flagyl [Metronidazole] Shortness Of Breath     Has taken diflucan at home 12/7/15    Metformin And Related [Metformin And Related] Shortness Of Breath    Benazepril      Other reaction(s): Not Recorded    Morphine      Bad reaction. \"makes her feel horrible\".      Saxagliptin      Other reaction(s): Not Recorded    Levaquin [Levofloxacin] Rash     Current Outpatient Medications   Medication Sig Dispense Refill    predniSONE (DELTASONE) 10 MG tablet Take 10 mg by mouth as needed (sob with asthma, back pain, and eoencinaphilia)      methocarbamol (ROBAXIN) 750 MG tablet Take 750 mg by mouth as needed (back pain)      oxyCODONE (ROXICODONE) 5 MG immediate release tablet Take 1 tablet by mouth every 6 hours as needed for Pain for up to 30 days. 100 tablet 0    metOLazone (ZAROXOLYN) 2.5 MG tablet Take 1 tablet by mouth daily 30 tablet 0    gabapentin (NEURONTIN) 300 MG capsule Take 1 capsule by mouth nightly for 90 days. Intended supply: 30 days 90 capsule 0    albuterol sulfate  (90 Base) MCG/ACT inhaler USE 2 INHALATIONS EVERY 6 HOURS AS NEEDED FOR WHEEZING 25.5 g 2    carvedilol (COREG) 25 MG tablet Take 1 tablet by mouth 2 times daily 270 tablet 3    potassium chloride (KLOR-CON M) 10 MEQ extended release tablet TAKE 1 TABLET DAILY 90 tablet 1    torsemide (DEMADEX) 100 MG tablet Take 1 tablet by mouth daily TAKE 1 TABLET DAILY 90 tablet 1    insulin glargine (LANTUS SOLOSTAR) 100 UNIT/ML injection pen INJECT 50 UNITS IN THE MORNING AND 24 UNITS AT BEDTIME (Patient taking differently: every morning 30 units in the morning and PRN at night if needed) 90 mL 1    albuterol (PROVENTIL) (2.5 MG/3ML) 0.083% nebulizer solution Take 3 mLs by nebulization every 6 hours as needed for Wheezing 120 each 3    exenatide (BYETTA 10 MCG PEN) 10 MCG/0.04ML injection Inject 0.04 mLs into the skin daily (Patient taking differently: Inject 10 mcg into the skin 2 times daily (with meals) ) 3 pen 3    spironolactone (ALDACTONE) 25 MG tablet TAKE 1 TABLET DAILY 90 tablet 3    polyethylene glycol (GLYCOLAX) 17 GM/SCOOP powder Take 17 g by mouth daily      omeprazole (PRILOSEC) 20 MG delayed release capsule Take 20 mg by mouth daily      ondansetron (ZOFRAN) 4 MG tablet Take 1 tablet by mouth 3 times daily as needed for Nausea or Vomiting 30 tablet 0    warfarin (COUMADIN) 5 MG tablet TAKE 1 TABLET DAILY (Patient taking differently: Take 5 mg by mouth daily Managed by PCP) 100 tablet 4    montelukast (SINGULAIR) 10 MG tablet TAKE 1 TABLET NIGHTLY 90 tablet 4    HUMALOG KWIKPEN 100 UNIT/ML SOPN TAKE AS INSTRUCTED BY YOUR PRESCRIBER (Patient taking differently:  Only if high blood sugar-has not been using) 15 mL 8    nystatin (MYCOSTATIN) 689370 UNIT/ML suspension TAKE ONE TEASPOONFUL BY MOUTH FOUR TIMES A DAY FOR 5 DAYS 1 Bottle 2    aspirin 81 MG chewable tablet Take 1 tablet by mouth daily 30 tablet 3    vitamin B-12 500 MCG tablet Take 1 tablet by mouth daily 30 tablet 3    blood glucose test strips (FREESTYLE LITE) strip USE TO TEST FOUR TIMES A  strip 4    FREESTYLE LITE strip USE TO TEST FOUR TIMES A  strip 0    FreeStyle Lancets MISC 1 each by Does not apply route 4 times daily 200 each 5    Blood Glucose Monitoring Suppl (EMBRACE PRO GLUCOSE METER) GAETANO 1 Device by Does not apply route 4 times daily 1 Device 0    BD PEN NEEDLE JANNET U/F 32G X 4 MM MISC USE 1 PEN NEEDLE FOUR TIMES A  each 3    Blood Glucose Monitoring Suppl (FREESTYLE LITE) GAETANO 1 Device by Does not apply route 4 times daily Patient to check blood sugar 4 times a day and PRN, she is treated with multiple daily injections of insulin that require correction dosing ;A1C 8.1 ; ICD code-E11.65 ,Z79.4 (Patient taking differently: 1 Device by Does not apply route 2 times daily ) 1 Device 0     No current facility-administered medications for this visit.         Past Medical History:   Diagnosis Date    Asthma     Atrial fibrillation (HCC)     Eosinophilia     Hemoptysis     HIGH CHOLESTEROL     Hx of blood clots     Hypertension     Irregular heart beat     Other specified gastritis without mention of hemorrhage     Palpitations     Skin cancer     basal and squamous    Type II or unspecified type diabetes mellitus without mention of complication, not stated as uncontrolled      Past Surgical History:   Procedure Laterality Date    BRONCHOSCOPY  07/18/2016    Dr. Ramon Bi - brushings from [address] [place] [address],42-10  08/16/2018    Dr. Army Lozano  02/07/2017    Dr. Joycelyn Hanna - sigmoid diverticulosis, polypectomies x3    COLONOSCOPY  01/17/2014    Dr. Joycelyn Hanna - sigmoid diverticulosis, polypectomies x3, internal hemorrhoids    COLONOSCOPY  10/10/2018    w/biopsy performed by Lori Post MD at [address] [facility] COLONOSCOPY N/A 8/7/2020    COLONOSCOPY DIAGNOSTIC performed by Elizabeth Martin MD at [address] GRAFT  08/21/2018    Dr. Margarita Tubbs - x3 (LIMA-LAD, L SV-D1-PLV) modified BL MAZE procedure w/obliteration of WAYNE using 45mm Ebony Mends Left 08/16/2018    Dr. oTny Calderon # [id number] [employer]    MITRAL VALVE REPLACEMENT  08/21/2018    Dr. Margarita Tubbs - 27mm [employer] Cinch tissue valve    PAIN MANAGEMENT PROCEDURE N/A 12/18/2020    C6-C7 MIDLINE  EPIDURAL STEROID INJECTION WITH FLUOROSCOPY performed by Lauren Nolasco MD at [address] TRANSESOPHAGEAL ECHOCARDIOGRAM  08/21/2018    during CABG/MVR    TUNNELED VENOUS CATHETER PLACEMENT Left 08/23/2018    Dr. Елена Mulligan - RAVINDRA for HD---since removed    UPPER GASTROINTESTINAL ENDOSCOPY N/A 10/10/2018    w/biopsy performed by Lori Post MD at [address]     Family History   Problem Relation Age of Onset    Cancer Father     Asthma Mother     Hypertension Mother     Heart Disease Mother     High Blood Pressure Mother      Social History     Socioeconomic History    Marital status:      Spouse name: Not on file    Number of children: Not on file    Years of education: Not on file    Highest education level: Not on file   Occupational History    Not on file   Social Needs    Financial resource strain: Not on file    Food insecurity     Worry: Not on file     Inability: Not on file    Transportation needs     Medical: Not on file     Non-medical: Not on file   Tobacco Use    Smoking status: Never Smoker    Smokeless tobacco: Never Used   Substance and Sexual Activity    Alcohol use: No    Drug use: No    Sexual activity: Not on file   Lifestyle    Physical activity     Days per week: Not on file     Minutes per session: Not on file    Stress: Not on file   Relationships    Social connections     Talks on phone: Not on file     Gets together: Not on file     Attends Latter day service: Not on file     Active member of club or organization: Not on file     Attends meetings of clubs or organizations: Not on file     Relationship status: Not on file    Intimate partner violence     Fear of current or ex partner: Not on file     Emotionally abused: Not on file     Physically abused: Not on file     Forced sexual activity: Not on file   Other Topics Concern    Not on file   Social History Narrative    Not on file       Review of Systems:   · Constitutional: there has been no unanticipated weight loss. There's been no change in energy level, sleep pattern, or activity level. · Eyes: No visual changes or diplopia. No scleral icterus. · ENT: No Headaches, hearing loss or vertigo. No mouth sores or sore throat. · Cardiovascular: Reviewed in HPI  · Respiratory: No cough or wheezing, no sputum production. No hematemesis. · Gastrointestinal: No abdominal pain, appetite loss, blood in stools. No change in bowel or bladder habits. · Genitourinary: No dysuria, trouble voiding, or hematuria. · Musculoskeletal:  No gait disturbance, weakness or joint complaints. · Integumentary: No rash or pruritis. · Neurological: No headache, diplopia, change in muscle strength, numbness or tingling. No change in gait, balance, coordination, mood, affect, memory, mentation, behavior. · Psychiatric: No anxiety, no depression. · Endocrine: No malaise, fatigue or temperature intolerance. No excessive thirst, fluid intake, or urination. No tremor. · Hematologic/Lymphatic: No abnormal bruising or bleeding, blood clots or swollen lymph nodes. · Allergic/Immunologic: No nasal congestion or hives.     Physical Examination:    Vitals:    01/04/21 0750   BP: 102/64   Pulse: 100   SpO2: 94%   Weight: 208 lb (94.3 kg) Height: 5' 8\" (1.727 m)     Body mass index is 31.63 kg/m². Wt Readings from Last 3 Encounters:   01/04/21 208 lb (94.3 kg)   12/28/20 218 lb 7.6 oz (99.1 kg)   12/21/20 218 lb 6 oz (99.1 kg)     BP Readings from Last 3 Encounters:   01/04/21 102/64   12/21/20 114/76   12/18/20 104/66     Constitutional and General Appearance:   Chronically ill appearing  HEENT:  NC/AT  MARIBEL  No problems with hearing  Skin:  Warm, dry  Respiratory:  · Normal excursion and expansion without use of accessory muscles  · Resp Auscultation: Normal breath sounds without dullness  Cardiovascular:  · The apical impulses not displaced  · Heart tones are crisp and normal  · Cervical veins are not engorged  · The carotid upstroke is normal in amplitude and contour without delay or bruit  · JVP less than 8 cm H2O  RRR with nl S1 and S2 without m,r,g  · Peripheral pulses are symmetrical and full  · There is no clubbing, cyanosis of the extremities. · No edema  · Femoral Arteries: 2+ and equal  · Pedal Pulses: 2+ and equal   Neck:  · No thyromegaly  Abdomen:  · No masses or tenderness  · Liver/Spleen: No Abnormalities Noted  Neurological/Psychiatric:  · Alert and oriented in all spheres  · Moves all extremities well  · Exhibits normal gait balance and coordination  · No abnormalities of mood, affect, memory, mentation, or behavior are noted      11/30/2020 Dobutamine Echo   Dobutamine echocardiogram for aortic stenosis. Very technically limited exam due to atrial fibrillation. Baseline echocardiogram shows severe left ventricular dysfunction with   anterior, lateral and apical hypokinesis with an ejection fraction of 20-25%. There is no improvement in wall motion with low or high dose dobutamine   suggestive of nonviable myocardium.       Mild prosthetic aortic valve stenosis with a mean gradient of 16mmHg and   peak velocity of 2.47m/s at rest. After dobutamine stress the mean AV   gradient rises to 20mmHg with 20mcg of dobutamine consistent with mild to   moderate aortic stenosis. Prosthetic mitral valve with a mean gradient of 7mmHg       JUDE 10/21/2020  Mildly dilated left ventricular size and normal wall thickness. Global left ventricular function is severely decreased with ejection fraction estimated at 25%. Patient is in atrial fibrillation during procedure. The bioprosthetic mitral valve is well seated with a mean gradient of 5mmHg and maximum pressure gradient of 15 mmHg with heart rate of 89 bpm. Pressure half time of 82 ms. There is trivial mitral regurgitation. Left atrial enlargement. Spontaneous echo contrast seen in the left atrium. A large stump of the left atrial appendage with no thrombus noted. Patient has history of surgical ligation of the left atrial appendage. There are spontaneous echo contrast in the atrial appendage. The aortic valve is thickened/calcified with decreased leaflet mobility consistent with aortic stenosis. There is trivial aortic insufficiency. There is moderate tricuspid regurgitation. ECHO 9/15/20  Left ventricular cavity size is dilated. There is normal wall thickness with a moderately severe reduction in   systolic function. LV ejection fraction is visually estimated to be 25-30%. Indeterminate diastolic function. The right ventricle is not well visualized but appears to be normal in size with moderately reduced function. The left atrium is moderately dilated. A bioprosthetic mitral valve with a mean gradient of 7 mmHg. This may be a normal gradient for this valve but could suggest mild stenosis. Trivial mitral regurgitation. The aortic valve is thickened/calcified with a mean gradient of 16mmHg consistent with at least mild aortic stenosis. This is likely underestimated due to low cardiac output secondary to LV dysfunction. No significant aortic valve regurgitation. Moderate tricuspid regurgitation with RVSP of 48 mmHg.     JUDE 11/1/2019  Normal left ventricular cavity size and wall thickness. Global left ventricular function is moderate-to-severely decreased with ejection fraction estimated from 35% to 40%. Severe apical akinesis noted. The bioprosthetic mitral valve is well seated with a mean gradient of 2mmHg and maximum pressure gradient of 5 mmHg. There is trivial mitral regurgitation. Left atrial enlargement. Spontaneous echo contrast seen in the left atrium. Stump of the left atrial appendage noted with no thrombus. The aortic valve is thickened/calcified with decreased leaflet mobility consistent with aortic stenosis. There is trivial aortic insufficiency. Labs were reviewed including labs from other hospital systems through [facility]. Cardiac testing was reviewed including echos, nuclear scans, cardiac catheterization, including from other hospital systems through [facility]. Assessment:    1. Chronic systolic heart failure (HCC)    2. Paroxysmal atrial fibrillation ([address]    3. Coronary artery disease involving native coronary artery of native heart without angina pectoris    4. Nonrheumatic aortic valve stenosis    5. Shortness of breath           sCHF  Stable, compensated  Coreg was increased last week per Dr. Jp Harmon. She took 1 dose of Valsartan and states she got dizzy so stopped taking it. She does not know why benazepril is listed as allergy. I explained to the patient that not tolerating valsartan rules out ability to use Entresto    JUDE 10/21/20> EF 25%  ECHO 9/15/20> EF 25-30%  JUDE 11/1/2019> EF 35-40%    CAD / CABG x3 8/2018 (LIMA-LAD, SVG-D1 and Posterior  LV branch of RCA  -- Dr. Pina Jay)  Stable, no anginal symptoms      AS/MVR 8/2018  Stable  F/w Dr. Jp Harmon  Dobutamine ECHO scheduled for 11/30/2020. JUDE 10/21/20> The aortic valve is thickened/calcified with decreased leaflet mobility consistent with aortic stenosis. There is trivial aortic insufficiency. Well-seated MV.  Mod TR.  ECHO 9/15/20> Mild AS, mild MR. Hypertension  Stable      PAF  HR regular & controlled  F/w EP    MAZE / LR implant 8/2018  RFCA/PVI ablation 10/2019  CV 1/2020 & 10/2020    DVT/PE, h/o  Remains on Coumadin, managed thru [facility]      Plan:  1. Increase Aldactone to 50mg daily  2. No more than 2 Metolazone per week, can take if weight >205lbs  3. BNP and BMP prior to visit in 3 months. Scribe's attestation: This note was scribed in the presence of Dr Yohannes Robles MD by Tootie Guevara, NAZANIN. The scribe's documentation has been prepared under my direction and personally reviewed by me in its entirety. I confirm that the note above accurately reflects all work, treatment, procedures, and medical decision making performed by me. Time Based Itemization  A total of 30 minutes was spent on today's patient encounter. If applicable, non-patient-facing activities:  (x ) Preparing to see the patient and reviewing records  ( ) Individual interpretation of results  ( ) Discussion or coordination of care with other health care professionals  ( x) Ordering of unique tests, medications, or procedures  ( x) Documentation within the EHR        I appreciate the opportunity of cooperating in the care of this patient.     Sb Landaverde M.D., [facility]

## 2021-01-04 NOTE — PATIENT INSTRUCTIONS
INCREASE SPIRONOLACTONE TO 50MG DAILY    TAKE METOLAZONE NO MORE THAN 2X PER WEEK FOR WEIGHT OVER 205 LBS    LABS BEFORE NEXT VISIT

## 2021-01-05 DIAGNOSIS — E11.69 DIABETES MELLITUS TYPE 2 IN OBESE (HCC): ICD-10-CM

## 2021-01-05 DIAGNOSIS — E66.9 DIABETES MELLITUS TYPE 2 IN OBESE (HCC): ICD-10-CM

## 2021-01-05 RX ORDER — BLOOD-GLUCOSE METER
KIT MISCELLANEOUS
Qty: 200 STRIP | Refills: 4 | Status: SHIPPED | OUTPATIENT
Start: 2021-01-05 | End: 2021-08-02

## 2021-01-05 NOTE — TELEPHONE ENCOUNTER
Patient needs refill also she needs a PA for the Rx due to the supply amount     Disp Refills Start End    FREESTYLE LITE strip 200 strip 0 11/24/2020     Sig: USE TO TEST FOUR TIMES A DAY      [facility] [place] - [address] [address] [phone] - F [phone]       Please advise

## 2021-01-06 ENCOUNTER — TELEPHONE (OUTPATIENT)
Dept: FAMILY MEDICINE CLINIC | Age: 76
End: 2021-01-06

## 2021-01-06 ENCOUNTER — HOSPITAL ENCOUNTER (OUTPATIENT)
Dept: CT IMAGING | Age: 76
Discharge: HOME | End: 2021-01-06
Payer: MEDICARE

## 2021-01-06 DIAGNOSIS — M51.34 DDD (DEGENERATIVE DISC DISEASE), THORACIC: ICD-10-CM

## 2021-01-06 DIAGNOSIS — M47.814 SPONDYLOSIS WITHOUT MYELOPATHY OR RADICULOPATHY, THORACIC REGION: ICD-10-CM

## 2021-01-06 PROCEDURE — 72128 CT CHEST SPINE W/O DYE: CPT

## 2021-01-06 NOTE — TELEPHONE ENCOUNTER
TT: Shanthi vazquez [place]- called to start PA on her Freestyle Lite Test strips for pt to test 4 times daily. TT: [address] [facility] for one year till 1/06/2022.      Auth# [id number]- faxed to pharmacy

## 2021-01-07 NOTE — TELEPHONE ENCOUNTER
Patient is requesting medicated eye drops for her inflamed left eye. Her eye is very red and sore.     [facility] [address], [address] [phone] - F [phone]      Provider out of office      Please advise

## 2021-01-07 NOTE — TELEPHONE ENCOUNTER
Please pend erythromycin ophthalmic ointment. Apply thin ribbon to affected eye 4 times daily.   Dispense 3.5 g tube

## 2021-01-08 RX ORDER — ERYTHROMYCIN 5 MG/G
OINTMENT OPHTHALMIC
Qty: 1 TUBE | Refills: 0 | Status: SHIPPED | OUTPATIENT
Start: 2021-01-08 | End: 2021-01-17

## 2021-01-11 ENCOUNTER — TELEPHONE (OUTPATIENT)
Dept: ORTHOPEDIC SURGERY | Age: 76
End: 2021-01-11

## 2021-01-11 ENCOUNTER — TELEPHONE (OUTPATIENT)
Dept: FAMILY MEDICINE CLINIC | Age: 76
End: 2021-01-11

## 2021-01-11 ENCOUNTER — OFFICE VISIT (OUTPATIENT)
Dept: ORTHOPEDIC SURGERY | Age: 76
End: 2021-01-11
Payer: MEDICARE

## 2021-01-11 VITALS — BODY MASS INDEX: 31.51 KG/M2 | HEIGHT: 68 IN | RESPIRATION RATE: 14 BRPM | WEIGHT: 207.89 LBS | TEMPERATURE: 97.1 F

## 2021-01-11 DIAGNOSIS — M47.814 SPONDYLOSIS WITHOUT MYELOPATHY OR RADICULOPATHY, THORACIC REGION: ICD-10-CM

## 2021-01-11 DIAGNOSIS — M48.04 THORACIC SPINAL STENOSIS: Primary | ICD-10-CM

## 2021-01-11 DIAGNOSIS — M51.34 DDD (DEGENERATIVE DISC DISEASE), THORACIC: ICD-10-CM

## 2021-01-11 PROCEDURE — 99214 OFFICE O/P EST MOD 30 MIN: CPT | Performed by: STUDENT IN AN ORGANIZED HEALTH CARE EDUCATION/TRAINING PROGRAM

## 2021-01-11 NOTE — LETTER
Please schedule the following with:     Date:   21 @ 2:45    Account: [de-identified]  Patient: Montez Erwin    : 1945  Address:  [address]    Phone (H):  [phone] (home)      ----------------------------------------------------------------------------------------------  Diagnosis:     ICD-10-CM    1. Thoracic spinal stenosis  M48.04    2. DDD (degenerative disc disease), thoracic  M51.34    3. Spondylosis without myelopathy or radiculopathy, thoracic region  M47.814          Levels:T11  Side: Bilateral   Procedure: Transforaminal epidural steroid injection  CPT Codes 84922  ----------------------------------------------------------------------------------------------  Injection # 1  [facility]    Attending Physician       Shelly Fletcher. Andrew Mcduffie MD.  ----------------------------------------------------------------------------------------------  Injection Scheduled For:    At:    [facility]  Pre-Cert#  2nd Insurance    Pre-Cert#    Comments:  · Infection control  ? Tested positive for MRSA in past 12 months:  no  ? Tested positive for MSSA \"staph infection\" in past 12 months: no  ? Tested positive for VRE (Vancomycin Resistant Enterococci) in past 12 months:   no  ? Currently on any antibiotics for an infection: no  · Anticoagulants:  ? On a blood thinner:  yes , Warfarin Dr. Russel Wing [phone]  ?  Any history of bleeding disorder: no   · Advanced Liver disease: no   · Advanced Renal disease: no   · Glaucoma: no   · Diabetes: yes      Sedation:         Yes  -----------------------------------------------------------------------------------------------  Allergies   Allergen Reactions    Vyovcmpn-Hxfpupk-Peqlec [Fluocinolone] Shortness Of Breath    Ciprofloxacin Shortness Of Breath    Diovan [Valsartan] Shortness Of Breath    Flagyl [Metronidazole] Shortness Of Breath     Has taken diflucan at home 12/7/15  Metformin And Related [Metformin And Related] Shortness Of Breath    Benazepril      Other reaction(s): Not Recorded    Morphine      Bad reaction. \"makes her feel horrible\".      Saxagliptin      Other reaction(s): Not Recorded    Levaquin [Levofloxacin] Rash

## 2021-01-11 NOTE — TELEPHONE ENCOUNTER
Dr Alan Stark office [phone] called about patient's treatment for back pain. Dr Joseluis Kessler plans to give patient a lumbar epidural steroid injection on 1/18. They need permission to have patient hold off on coumadin 5 days prior to injection.       Please advise

## 2021-01-11 NOTE — TELEPHONE ENCOUNTER
L/m for approval to hold COUMADIN for 5 days prior to [facility] on 1/18/21. Patient aware of hold date.

## 2021-01-11 NOTE — PROGRESS NOTES
Follow up: [address] [facility]  1945  [id number]      CHIEF COMPLAINT:    Chief Complaint   Patient presents with    Back Pain     TR CT TSP - reports 9-10/10 pain level. pain w/ generalized movements. no ambulatory aids.  Discuss Medications     requesting Rx for pain control. HISTORY OF PRESENT ILLNESS:  Ms. Bonita Sanches is a 77 y.o. female returns for a follow up visit for multiple medical problems. Her current presenting problems are   1. Thoracic spinal stenosis    2. DDD (degenerative disc disease), thoracic    3. Spondylosis without myelopathy or radiculopathy, thoracic region    . As per information/history obtained from the PADT(patient assessment and documentation tool) - She complains of pain in the upper back and mid back with radiation to the abdomen She rates the pain 8/10 and describes it as sharp, dull, aching. Pain is made worse by: movement. She denies side effects from the current pain regimen. Patient reports that since the last follow up visit the physical functioning is unchanged, family/social relationships are unchanged, mood is worse and sleep patterns are unchanged, and that the overall functioning is worse. Patient denies neurological bowel or bladder. Ms. Bonita Sanches presents for follow-up of ongoing worsening mid back pain located right below the bra line. The patient recently underwent a CT of the thoracic spine and is here today to discuss results. The patient continues to complain of pain in the neck and mid back which radiates down the center of the mid back and to the ribs bilaterally. She states the pain below the bra line is the worst.  Patient does state that radiating pain switches sides frequently, however today right is worse than left. The patient continues to take oxycodone however she states it takes 2-1/2 hours for this to work. She has not found any alleviating measures for her pain.   The patient did not have any relief with a C6-C7 interlaminar epidural steroid injection on 12/18/2020.       Associated signs and symptoms:   Neurogenic bowel or bladder symptoms:  no   Perceived weakness:  no   Difficulty walking:  no              Past Medical History:   Past Medical History:   Diagnosis Date    Asthma     Atrial fibrillation (HCC)     Eosinophilia     Hemoptysis     HIGH CHOLESTEROL     Hx of blood clots     Hypertension     Irregular heart beat     Other specified gastritis without mention of hemorrhage     Palpitations     Skin cancer     basal and squamous    Type II or unspecified type diabetes mellitus without mention of complication, not stated as uncontrolled       Past Surgical History:     Past Surgical History:   Procedure Laterality Date    BRONCHOSCOPY  07/18/2016    Dr. Mihai Boyer - brushings from [address] [facility]  08/16/2018    Dr. Randolph Phillips  02/07/2017    Dr. Nishant Lowe - sigmoid diverticulosis, polypectomies x3    COLONOSCOPY  01/17/2014    Dr. Nishant Lowe - sigmoid diverticulosis, polypectomies x3, internal hemorrhoids    COLONOSCOPY  10/10/2018    w/biopsy performed by Jocelynn Gonzales MD at [address] N/A 8/7/2020    COLONOSCOPY DIAGNOSTIC performed by Mihai Boyer MD at [address]  08/21/2018    Dr. Carmona Sero - x3 (LIMA-LAD, L SV-D1-PLV) modified BL MAZE procedure w/obliteration of WAYNE using 45mm AtriClip    HYSTERECTOMY      INSERTABLE CARDIAC MONITOR Left 08/16/2018    Dr. Nichole Whitman SN# [id number] [employer]    MITRAL VALVE REPLACEMENT  08/21/2018    Dr. Mathew Lovell - 27mm Med[employer]onic Cinch tissue valve    PAIN MANAGEMENT PROCEDURE N/A 12/18/2020    C6-C7 MIDLINE  EPIDURAL STEROID INJECTION WITH FLUOROSCOPY performed by Carli Ward MD at [address] TRANSESOPHAGEAL ECHOCARDIOGRAM  08/21/2018    during CABG/MVR    TUNNELED VENOUS CATHETER PLACEMENT Left 08/23/2018 Dr. Jaime Jennings for HD---since removed    UPPER GASTROINTESTINAL ENDOSCOPY N/A 10/10/2018    w/biopsy performed by Diamante Encinas MD at [address]     Current Medications:     Current Outpatient Medications:     erythromycin (ROMYCIN) 5 MG/GM ophthalmic ointment, Thin ribbon to affected eye QID, Disp: 1 Tube, Rfl: 0    blood glucose test strips (FREESTYLE LITE) strip, USE TO TEST FOUR TIMES A DAY, Disp: 200 strip, Rfl: 4    predniSONE (DELTASONE) 10 MG tablet, Take 10 mg by mouth as needed (sob with asthma, back pain, and eoencinaphilia), Disp: , Rfl:     methocarbamol (ROBAXIN) 750 MG tablet, Take 750 mg by mouth as needed (back pain), Disp: , Rfl:     spironolactone (ALDACTONE) 50 MG tablet, TAKE 1 TABLET DAILY, Disp: 90 tablet, Rfl: 3    oxyCODONE (ROXICODONE) 5 MG immediate release tablet, Take 1 tablet by mouth every 6 hours as needed for Pain for up to 30 days. , Disp: 100 tablet, Rfl: 0    metOLazone (ZAROXOLYN) 2.5 MG tablet, Take 1 tablet by mouth daily, Disp: 30 tablet, Rfl: 0    gabapentin (NEURONTIN) 300 MG capsule, Take 1 capsule by mouth nightly for 90 days.  Intended supply: 30 days, Disp: 90 capsule, Rfl: 0    albuterol sulfate  (90 Base) MCG/ACT inhaler, USE 2 INHALATIONS EVERY 6 HOURS AS NEEDED FOR WHEEZING, Disp: 25.5 g, Rfl: 2    carvedilol (COREG) 25 MG tablet, Take 1 tablet by mouth 2 times daily, Disp: 270 tablet, Rfl: 3    potassium chloride (KLOR-CON M) 10 MEQ extended release tablet, TAKE 1 TABLET DAILY, Disp: 90 tablet, Rfl: 1    torsemide (DEMADEX) 100 MG tablet, Take 1 tablet by mouth daily TAKE 1 TABLET DAILY, Disp: 90 tablet, Rfl: 1    insulin glargine (LANTUS SOLOSTAR) 100 UNIT/ML injection pen, INJECT 50 UNITS IN THE MORNING AND 24 UNITS AT BEDTIME (Patient taking differently: every morning 30 units in the morning and PRN at night if needed), Disp: 90 mL, Rfl: 1    albuterol (PROVENTIL) (2.5 MG/3ML) 0.083% nebulizer solution, Take 3 mLs by nebulization every 6 hours as needed for Wheezing, Disp: 120 each, Rfl: 3    exenatide (BYETTA 10 MCG PEN) 10 MCG/0.04ML injection, Inject 0.04 mLs into the skin daily (Patient taking differently: Inject 10 mcg into the skin 2 times daily (with meals) ), Disp: 3 pen, Rfl: 3    polyethylene glycol (GLYCOLAX) 17 GM/SCOOP powder, Take 17 g by mouth daily, Disp: , Rfl:     omeprazole (PRILOSEC) 20 MG delayed release capsule, Take 20 mg by mouth daily, Disp: , Rfl:     FreeStyle Lancets MISC, 1 each by Does not apply route 4 times daily, Disp: 200 each, Rfl: 5    ondansetron (ZOFRAN) 4 MG tablet, Take 1 tablet by mouth 3 times daily as needed for Nausea or Vomiting, Disp: 30 tablet, Rfl: 0    warfarin (COUMADIN) 5 MG tablet, TAKE 1 TABLET DAILY (Patient taking differently: Take 5 mg by mouth daily Managed by PCP), Disp: 100 tablet, Rfl: 4    montelukast (SINGULAIR) 10 MG tablet, TAKE 1 TABLET NIGHTLY, Disp: 90 tablet, Rfl: 4    Blood Glucose Monitoring Suppl (EMBRACE PRO GLUCOSE METER) GAETANO, 1 Device by Does not apply route 4 times daily, Disp: 1 Device, Rfl: 0    HUMALOG KWIKPEN 100 UNIT/ML SOPN, TAKE AS INSTRUCTED BY YOUR PRESCRIBER (Patient taking differently:  Only if high blood sugar-has not been using), Disp: 15 mL, Rfl: 8    nystatin (MYCOSTATIN) 769740 UNIT/ML suspension, TAKE ONE TEASPOONFUL BY MOUTH FOUR TIMES A DAY FOR 5 DAYS, Disp: 1 Bottle, Rfl: 2    aspirin 81 MG chewable tablet, Take 1 tablet by mouth daily, Disp: 30 tablet, Rfl: 3    BD PEN NEEDLE JANNET U/F 32G X 4 MM MISC, USE 1 PEN NEEDLE FOUR TIMES A DAY, Disp: 360 each, Rfl: 3    vitamin B-12 500 MCG tablet, Take 1 tablet by mouth daily, Disp: 30 tablet, Rfl: 3    Blood Glucose Monitoring Suppl (FREESTYLE LITE) GAETANO, 1 Device by Does not apply route 4 times daily Patient to check blood sugar 4 times a day and PRN, she is treated with multiple daily injections of insulin that require correction dosing ;A1C 8.1 ; ICD code-E11.65 ,Z79.4 (Patient taking differently: 1 Device by Does not apply route 2 times daily ), Disp: 1 Device, Rfl: 0  Allergies:  Nvuvkvxk-zleuxvc-vmuope [fluocinolone], Ciprofloxacin, Diovan [valsartan], Flagyl [metronidazole], Metformin and related [metformin and related], Benazepril, Morphine, Saxagliptin, and Levaquin [levofloxacin]  Social History:    reports that she has never smoked. She has never used smokeless tobacco. She reports that she does not drink alcohol or use drugs. Family History:   Family History   Problem Relation Age of Onset    Cancer Father     Asthma Mother     Hypertension Mother     Heart Disease Mother     High Blood Pressure Mother        REVIEW OF SYSTEMS:   CONSTITUTIONAL: Denies unexplained weight loss, fevers, chills or fatigue  NEUROLOGICAL: Denies unsteady gait or progressive weakness  MUSCULOSKELETAL: Denies joint swelling or redness  GI: Denies nausea, vomiting, diarrhea   : Denies bowel or bladder issues       PHYSICAL EXAM:    Vitals: Temperature 97.1 °F (36.2 °C), temperature source Infrared, resp. rate 14, height 5' 7.99\" (1.727 m), weight 207 lb 14.3 oz (94.3 kg), not currently breastfeeding. GENERAL EXAM:  · General Apparence: Patient is adequately groomed with no evidence of malnutrition. · Psychiatric: Orientation: The patient is oriented to time, place and person. The patient's mood and affect are appropriate   · Vascular: Examination reveals no swelling and palpation reveals no tenderness in upper or lower extremities. Good capillary refill. · The lymphatic examination of the neck, axillae and groin reveals all areas to be without enlargement or induration  · Sensation is intact without deficit in the upper and lower extremities to light touch and pinprick  · Coordination of the upper and lower extremities are normal.    CERVICAL/THORACIC EXAMINATION:  · Inspection: Local inspection shows no step-off or bruising. Cervical alignment is normal. No instability is noted.   · Palpation and Percussion: No evidence of tenderness at the midline. Paraspinal tenderness is present with moderate to severe thoracic spine tenderness around T10-T12. There is no paraspinal spasm. Skin:There are no rashes, ulcerations or lesions  · Range of Motion:  limited by 50% in all planes due to pain   · Strength: 5/5 bilateral upper extremities  · Special Tests:   Spurling's and Hampton's are negative bilaterally. Sharp and Impingement tests are negative bilaterally. · Skin:There are no rashes, ulcerations or lesions in right & left upper extremities. · Reflexes: Bilaterally triceps, biceps and brachioradialis are 1+. Clonus absent bilaterally at the feet. No pathological reflexes are noted. · Gait & station: normal, patient ambulates without assistance  · Additional Examinations:  · RIGHT UPPER EXTREMITY:  Inspection/examination of the right upper extremity does not show any tenderness, deformity or injury. Range of motion is unremarkable and pain-free. There is no gross instability. There are no rashes, ulcerations or lesions. Strength and tone are normal. No atrophy or abnormal movements are noted. · LEFT UPPER EXTREMITY: Inspection/examination of the left upper extremity does not show any tenderness, deformity or injury. Range of motion is unremarkable and pain-free. There is no gross instability. There are no rashes, ulcerations or lesions. Strength and tone are normal. No atrophy or abnormal movements are noted. · RIGHT LOWER EXTREMITY: Inspection/examination of the right lower extremity does not show any tenderness, deformity or injury. Range of motion is normal and pain-free. There is no gross instability. There are no rashes, ulcerations or lesions. Strength and tone are normal. No atrophy or abnormal movements are noted. · LEFT LOWER EXTREMITY:  Inspection/examination of the left lower extremity does not show any tenderness, deformity or injury. Range of motion is normal and pain-free.  There is no gross instability. There are no rashes, ulcerations or lesions. Strength and tone are normal. No atrophy or abnormal movements are noted. Diagnostic Testing:    MR Thoracic 1/6/21   FINDINGS:   BONES/ALIGNMENT: There is normal alignment of the spine. The vertebral body   heights are maintained. No osseous destructive lesion is seen.       DEGENERATIVE CHANGES: Multilevel degenerative changes most severe at T11-T12   are again demonstrated and are not significantly changed when compared to the   previous exam. Juan Pablo Amy is persistent moderate central stenosis at this level.       SOFT TISSUES: No paraspinal mass is seen.        Results for orders placed or performed during the hospital encounter of 12/23/20   Lipid Panel   Result Value Ref Range    Cholesterol, Total 149 0 - 199 mg/dL    Triglycerides 60 0 - 150 mg/dL    HDL 40 40 - 60 mg/dL    LDL Calculated 97 <100 mg/dL    VLDL Cholesterol Calculated 12 Not Established mg/dL   Hemoglobin A1C   Result Value Ref Range    Hemoglobin A1C 6.1 See comment %    eAG 128.4 mg/dL   CBC WITH AUTO DIFFERENTIAL   Result Value Ref Range    WBC 4.8 4.0 - 11.0 K/uL    RBC 3.96 (L) 4.00 - 5.20 M/uL    Hemoglobin 11.1 (L) 12.0 - 16.0 g/dL    Hematocrit 34.6 (L) 36.0 - 48.0 %    MCV 87.2 80.0 - 100.0 fL    MCH 28.0 26.0 - 34.0 pg    MCHC 32.1 31.0 - 36.0 g/dL    RDW 17.1 (H) 12.4 - 15.4 %    Platelets 876 342 - 427 K/uL    MPV 9.2 5.0 - 10.5 fL    Neutrophils % 58.9 %    Lymphocytes % 20.1 %    Monocytes % 11.7 %    Eosinophils % 8.6 %    Basophils % 0.7 %    Neutrophils Absolute 2.8 1.7 - 7.7 K/uL    Lymphocytes Absolute 1.0 1.0 - 5.1 K/uL    Monocytes Absolute 0.6 0.0 - 1.3 K/uL    Eosinophils Absolute 0.4 0.0 - 0.6 K/uL    Basophils Absolute 0.0 0.0 - 0.2 K/uL   Comprehensive Metabolic Panel   Result Value Ref Range    Sodium 143 136 - 145 mmol/L    Potassium 3.6 3.5 - 5.1 mmol/L    Chloride 103 99 - 110 mmol/L    CO2 31 21 - 32 mmol/L    Anion Gap 9 3 - 16    Glucose 75 70 - 99 mg/dL    BUN 31 (H) 7 - 20 mg/dL    CREATININE 1.3 (H) 0.6 - 1.2 mg/dL    GFR Non- 40 (A) >60    GFR  48 (A) >60    Calcium 8.6 8.3 - 10.6 mg/dL    Total Protein 5.6 (L) 6.4 - 8.2 g/dL    Alb 2.8 (L) 3.4 - 5.0 g/dL    Albumin/Globulin Ratio 1.0 (L) 1.1 - 2.2    Total Bilirubin 0.3 0.0 - 1.0 mg/dL    Alkaline Phosphatase 116 40 - 129 U/L    ALT 15 10 - 40 U/L    AST 16 15 - 37 U/L    Globulin 2.8 g/dL     Impression:       1. Thoracic spinal stenosis    2. DDD (degenerative disc disease), thoracic    3. Spondylosis without myelopathy or radiculopathy, thoracic region        Plan:  Clinical Course: Above diagnoses are worsening    I discussed the diagnosis and the treatment options with Mau Campbell today. In Summary:  The various treatment options were outlined and discussed with Mau Campbell including:  Conservative care options: physical therapy, ice, medications, bracing, and activity modification. The indications for therapeutic injections. The indications for additional imaging/laboratory studies. The indications for (possible future) interventions. After considering the various options discussed, Mau Campbell elected to pursue a course of treatment that includes the followin. Medications:  No further recommendations for new medications. 2. PT:  Encouraged to continue with HEP. 3. Further studies:  No further studies. 4. Interventional:  We discussed pursuing a bilateral T11 transforaminal epidural steroid injection to address the pain. Radiologic imaging and symptoms confirm the pain etiology. Risks, benefits and alternatives of interventional options were discussed. These include and are not limited to bleeding, infection, spinal headache, nerve injury and lack of pain relief. The patient verbalized understanding and would like to proceed. The patient will be scheduled accordingly.      Repeat Therapeutic MONALISA with negative relief from first therapeutic injection    As such, I have confirmed the patient has met the general requirements including failure of conservative management including prescription strength analgesics, adjunctive medications were utilized , therapeutic exercise program, and no underlying addiction or behavioral disorders were identified to the ability of the provider. Symptoms are impacting their ADLs or iADLs such as walking and transferring and significant pain was noted in the HPI. Imaging studies as noted in the diagnostic imaging section of the consultation were reviewed and correlated with clinical findings. Fluoroscopy is utilized for interventional procedure. The initial therapeutic MONALISA injection did not result in pain relief, but a second therapeutic MONALISA is indicated as the injection is being performed through a different level at T11.     5. Follow up:  4-6 weeks      Angie Fonseca was instructed to call the office if her symptoms worsen or if new symptoms appear prior to the next scheduled visit. She is specifically instructed to contact the office between now & her scheduled appointment if she has concerns related to her condition or if she needs assistance in scheduling the above tests. She is welcome to call for an appointment sooner if she has any additional concerns or questions. Mendez Lovelace PA-C   Board Certified by the [employer] on Certification of Physician Assistants  Sameera Ann 59  Partner of [facility] ([facility])             This dictation was performed with a verbal recognition program [facility]) and it was checked for errors. It is possible that there are still dictated errors within this office note. If so, please bring any errors to my attention for an addendum. All efforts were made to ensure that this office note is accurate.

## 2021-01-13 ENCOUNTER — TELEPHONE (OUTPATIENT)
Dept: ORTHOPEDIC SURGERY | Age: 76
End: 2021-01-13

## 2021-01-13 NOTE — TELEPHONE ENCOUNTER
Auth: # [id number]    Date: 1/13/2021 thru 7/12/2021  Type of SX:  Outpatient MONALISA  Location: [facility]  CPT: 002216   DX Code: M48.04, M51.34, M47.814  SX area: Thoracic spine  Insurance: [address] [place], [place] [address]    CPT: [address] [facility], [id number], [id number]  [address] [place] [address]

## 2021-01-15 ENCOUNTER — ANTI-COAG VISIT (OUTPATIENT)
Dept: FAMILY MEDICINE CLINIC | Age: 76
End: 2021-01-15

## 2021-01-15 LAB — INR BLD: 2.2

## 2021-01-18 ENCOUNTER — HOSPITAL ENCOUNTER (OUTPATIENT)
Age: 76
Setting detail: OUTPATIENT SURGERY
Discharge: HOME | End: 2021-01-18
Attending: PHYSICAL MEDICINE & REHABILITATION | Admitting: PHYSICAL MEDICINE & REHABILITATION
Payer: MEDICARE

## 2021-01-18 ENCOUNTER — APPOINTMENT (OUTPATIENT)
Dept: GENERAL RADIOLOGY | Age: 76
End: 2021-01-18
Attending: PHYSICAL MEDICINE & REHABILITATION
Payer: MEDICARE

## 2021-01-18 ENCOUNTER — NURSE ONLY (OUTPATIENT)
Dept: CARDIOLOGY CLINIC | Age: 76
End: 2021-01-18
Payer: MEDICARE

## 2021-01-18 VITALS
WEIGHT: 200 LBS | TEMPERATURE: 96.6 F | SYSTOLIC BLOOD PRESSURE: 112 MMHG | OXYGEN SATURATION: 100 % | HEART RATE: 97 BPM | DIASTOLIC BLOOD PRESSURE: 71 MMHG | RESPIRATION RATE: 20 BRPM | BODY MASS INDEX: 30.31 KG/M2 | HEIGHT: 68 IN

## 2021-01-18 DIAGNOSIS — I47.1 PAROXYSMAL SVT (SUPRAVENTRICULAR TACHYCARDIA) (HCC): ICD-10-CM

## 2021-01-18 DIAGNOSIS — Z45.09 ENCOUNTER FOR ELECTRONIC ANALYSIS OF REVEAL EVENT RECORDER: ICD-10-CM

## 2021-01-18 LAB
GLUCOSE BLD-MCNC: 107 MG/DL (ref 70–99)
INR BLD: 1.2 (ref 0.86–1.14)
PERFORMED ON: ABNORMAL
PROTHROMBIN TIME: 13.9 SEC (ref 10–13.2)

## 2021-01-18 PROCEDURE — 2709999900 HC NON-CHARGEABLE SUPPLY: Performed by: PHYSICAL MEDICINE & REHABILITATION

## 2021-01-18 PROCEDURE — G2066 INTER DEVC REMOTE 30D: HCPCS | Performed by: INTERNAL MEDICINE

## 2021-01-18 PROCEDURE — 2500000003 HC RX 250 WO HCPCS: Performed by: PHYSICAL MEDICINE & REHABILITATION

## 2021-01-18 PROCEDURE — 6360000002 HC RX W HCPCS: Performed by: PHYSICAL MEDICINE & REHABILITATION

## 2021-01-18 PROCEDURE — 36415 COLL VENOUS BLD VENIPUNCTURE: CPT

## 2021-01-18 PROCEDURE — 77003 FLUOROGUIDE FOR SPINE INJECT: CPT

## 2021-01-18 PROCEDURE — 99152 MOD SED SAME PHYS/QHP 5/>YRS: CPT | Performed by: PHYSICAL MEDICINE & REHABILITATION

## 2021-01-18 PROCEDURE — 93298 REM INTERROG DEV EVAL SCRMS: CPT | Performed by: INTERNAL MEDICINE

## 2021-01-18 PROCEDURE — 85610 PROTHROMBIN TIME: CPT

## 2021-01-18 PROCEDURE — 3610000056 HC PAIN LEVEL 4 BASE (NON-OR): Performed by: PHYSICAL MEDICINE & REHABILITATION

## 2021-01-18 RX ORDER — FENTANYL CITRATE 50 UG/ML
INJECTION, SOLUTION INTRAMUSCULAR; INTRAVENOUS
Status: COMPLETED | OUTPATIENT
Start: 2021-01-18 | End: 2021-01-18

## 2021-01-18 RX ORDER — BUPIVACAINE HYDROCHLORIDE 5 MG/ML
INJECTION, SOLUTION EPIDURAL; INTRACAUDAL
Status: COMPLETED | OUTPATIENT
Start: 2021-01-18 | End: 2021-01-18

## 2021-01-18 RX ORDER — BETAMETHASONE SODIUM PHOSPHATE AND BETAMETHASONE ACETATE 3; 3 MG/ML; MG/ML
INJECTION, SUSPENSION INTRA-ARTICULAR; INTRALESIONAL; INTRAMUSCULAR; SOFT TISSUE
Status: COMPLETED | OUTPATIENT
Start: 2021-01-18 | End: 2021-01-18

## 2021-01-18 RX ORDER — LIDOCAINE HYDROCHLORIDE 10 MG/ML
INJECTION, SOLUTION INFILTRATION; PERINEURAL
Status: COMPLETED | OUTPATIENT
Start: 2021-01-18 | End: 2021-01-18

## 2021-01-18 RX ORDER — MIDAZOLAM HYDROCHLORIDE 1 MG/ML
INJECTION INTRAMUSCULAR; INTRAVENOUS
Status: COMPLETED | OUTPATIENT
Start: 2021-01-18 | End: 2021-01-18

## 2021-01-18 ASSESSMENT — PAIN DESCRIPTION - PROGRESSION
CLINICAL_PROGRESSION: GRADUALLY IMPROVING
CLINICAL_PROGRESSION: NOT CHANGED

## 2021-01-18 ASSESSMENT — PAIN SCALES - GENERAL
PAINLEVEL_OUTOF10: 6
PAINLEVEL_OUTOF10: 5

## 2021-01-18 ASSESSMENT — PAIN - FUNCTIONAL ASSESSMENT
PAIN_FUNCTIONAL_ASSESSMENT: 0-10
PAIN_FUNCTIONAL_ASSESSMENT: PREVENTS OR INTERFERES SOME ACTIVE ACTIVITIES AND ADLS
PAIN_FUNCTIONAL_ASSESSMENT: PREVENTS OR INTERFERES SOME ACTIVE ACTIVITIES AND ADLS

## 2021-01-18 ASSESSMENT — PAIN DESCRIPTION - PAIN TYPE: TYPE: CHRONIC PAIN

## 2021-01-18 ASSESSMENT — PAIN DESCRIPTION - DESCRIPTORS: DESCRIPTORS: ACHING

## 2021-01-18 NOTE — LETTER
[address] [facility] [phone]  [address] [facility] [place] [address] [phone]    Pacemaker/Defibrillator Clinic          01/18/21        Jackelyn Elaine  [address] [address]        Dear Jackelyn Elaine    This letter is to inform you that we received the transmission from your monitor at home that checks your implanted heart device. The next date your monitor will automatically transmit will be 2-22-21. If your report needs attention we will notify you. Your device and monitor are wireless and most transmit cellularly, but please periodically check your monitor is still plugged in to the electrical outlet. If you still use the telephone land line to send please ensure the connection to the phone abel is secure. This will help to ensure successful automatic transmissions in the future. Also, the monitor needs to be close to you while sleeping at night. Please be aware that the remote device transmission sites are periodically monitored only during regular business hours during which simultaneous in-office device clinics are being run. If your transmission requires attention, we will contact you as soon as possible. Thank you.             Beto 82

## 2021-01-18 NOTE — H&P
08/21/2018    Dr. King Perez - 27mm Medtronic Cinch tissue valve    PAIN MANAGEMENT PROCEDURE N/A 12/18/2020    C6-C7 MIDLINE  EPIDURAL STEROID INJECTION WITH FLUOROSCOPY performed by Annabelle Castillo MD at [address] TRANSESOPHAGEAL ECHOCARDIOGRAM  08/21/2018    during CABG/MVR    TUNNELED VENOUS CATHETER PLACEMENT Left 08/23/2018    Dr. Mary Conley for HD---since removed    UPPER GASTROINTESTINAL ENDOSCOPY N/A 10/10/2018    w/biopsy performed by Pepito Sage MD at [address] [facility]     Current Medications:   Prior to Admission medications    Medication Sig Start Date End Date Taking? Authorizing Provider   blood glucose test strips (FREESTYLE LITE) strip USE TO TEST FOUR TIMES A DAY 1/5/21   Terence Obrien MD   predniSONE (DELTASONE) 10 MG tablet Take 10 mg by mouth as needed (sob with asthma, back pain, and eoencinaphilia)    Historical Provider, MD   methocarbamol (ROBAXIN) 750 MG tablet Take 750 mg by mouth as needed (back pain)    Historical Provider, MD   spironolactone (ALDACTONE) 50 MG tablet TAKE 1 TABLET DAILY 1/4/21   Meaghan Mixon MD   oxyCODONE (ROXICODONE) 5 MG immediate release tablet Take 1 tablet by mouth every 6 hours as needed for Pain for up to 30 days. 12/21/20 1/20/21  Terence Obrien MD   metOLazone (ZAROXOLYN) 2.5 MG tablet Take 1 tablet by mouth daily 12/21/20   Terence Obrien MD   gabapentin (NEURONTIN) 300 MG capsule Take 1 capsule by mouth nightly for 90 days.  Intended supply: 30 days 12/14/20 3/14/21  MUNDO Oliveira   albuterol sulfate  (90 Base) MCG/ACT inhaler USE 2 INHALATIONS EVERY 6 HOURS AS NEEDED FOR WHEEZING 11/17/20   Terence Obrien MD   carvedilol (COREG) 25 MG tablet Take 1 tablet by mouth 2 times daily 11/12/20   Leanne Golden MD   potassium chloride (KLOR-CON M) 10 MEQ extended release tablet TAKE 1 TABLET DAILY 9/23/20   Terence Obrien MD   torsemide (DEMADEX) 100 MG tablet Take 1 tablet by mouth daily TAKE 1 TABLET DAILY 9/23/20   Mercedez Gilmore MD   insulin glargine (LANTUS SOLOSTAR) 100 UNIT/ML injection pen INJECT 50 UNITS IN THE MORNING AND 24 UNITS AT BEDTIME  Patient taking differently: every morning 30 units in the morning and PRN at night if needed 7/10/20   Mercedez Gilmore MD   albuterol (PROVENTIL) (2.5 MG/3ML) 0.083% nebulizer solution Take 3 mLs by nebulization every 6 hours as needed for Wheezing 6/2/20   Mercedez Gilmore MD   exenatide (BYETTA 10 MCG PEN) 10 MCG/0.04ML injection Inject 0.04 mLs into the skin daily  Patient taking differently: Inject 10 mcg into the skin 2 times daily (with meals)  5/29/20   Mercedez Gilmore MD   polyethylene glycol (GLYCOLAX) 17 GM/SCOOP powder Take 17 g by mouth daily    Historical Provider, MD   omeprazole (PRILOSEC) 20 MG delayed release capsule Take 20 mg by mouth daily    Historical Provider, MD   FreeStyle Lancets MISC 1 each by Does not apply route 4 times daily 4/29/20   Mercedez Gilmore MD   ondansetron (ZOFRAN) 4 MG tablet Take 1 tablet by mouth 3 times daily as needed for Nausea or Vomiting 3/26/20   Mercedez Gilmore MD   warfarin (COUMADIN) 5 MG tablet TAKE 1 TABLET DAILY  Patient taking differently: Take 5 mg by mouth daily Managed by PCP 2/26/20   Jordan Martinez MD   montelukast (SINGULAIR) 10 MG tablet TAKE 1 TABLET NIGHTLY 2/26/20   Jordan Martinez MD   Blood Glucose Monitoring Suppl (EMBRACE PRO GLUCOSE METER) GAETANO 1 Device by Does not apply route 4 times daily 2/4/20   Mercedez Gilmore MD   HUMALOG KWIKPEN 100 UNIT/ML SOPN TAKE AS INSTRUCTED BY YOUR PRESCRIBER  Patient taking differently:  Only if high blood sugar-has not been using 12/30/19   Mercedez Gilmore MD   nystatin (MYCOSTATIN) 365426 UNIT/ML suspension TAKE ONE TEASPOONFUL BY MOUTH FOUR TIMES A DAY FOR 5 DAYS 11/20/19   Mercedez Gilmore MD   aspirin 81 MG chewable tablet Take 1 tablet by mouth daily 6/7/19   Mercedez Gilmore MD   BD PEN NEEDLE JANNET U/F 32G X 4 MM MISC USE 1 PEN NEEDLE FOUR TIMES A DAY 3/22/19   Twyla Rodriguez MD   vitamin B-12 500 MCG tablet Take 1 tablet by mouth daily 10/12/18   aLquita De Guzman MD   Blood Glucose Monitoring Suppl (FREESTYLE LITE) GAETANO 1 Device by Does not apply route 4 times daily Patient to check blood sugar 4 times a day and PRN, she is treated with multiple daily injections of insulin that require correction dosing ;A1C 8.1 ; ICD code-E11.65 ,Z79.4  Patient taking differently: 1 Device by Does not apply route 2 times daily  9/4/18   Neno Mora, SOLEDAD - CNP     Allergies:  Suxhhvhd-fbwmdxh-ubeyby [fluocinolone], Ciprofloxacin, Diovan [valsartan], Flagyl [metronidazole], Metformin and related [metformin and related], Benazepril, Morphine, Saxagliptin, and Levaquin [levofloxacin]  Social History:    reports that she has never smoked. She has never used smokeless tobacco. She reports that she does not drink alcohol or use drugs. Family History:   Family History   Problem Relation Age of Onset    Cancer Father     Asthma Mother     Hypertension Mother     Heart Disease Mother     High Blood Pressure Mother        Vitals: not currently breastfeeding. PHYSICAL EXAM:including affected areas  Cardiovascular: Normal rate, regular rhythm, normal heart sounds. Pulmonary/Chest: No wheezes. Extremities: Moves all extremities equally  Cervical and Lumbar Spine: Painful range of motion, no midline tenderness       Diagnosis:Thoracic radiculitis  M48.04   M51.34   M47.814    Plan: Proceed with planned procedure      ASA CLASS:         []   I. Normal, healthy adult           [x]   II.  Mild systemic disease            []   III. Severe systemic disease      Mallampati: Mallampati Class II - (soft palate, fauces & uvula are visible)      Sedation plan:   [x]  Local              []  Minimal                  []  General anesthesia    Patient's condition acceptable for planned procedure/sedation.    Post Procedure Plan   Return to same level of care   ______________________     The patient was counseled at length about the risks of uma Covid-19 in the stacie-operative and post-operative states including the recovery window of their procedure. The patient was made aware that uma Covid-19 after a surgical procedure may worsen their prognosis for recovering from the virus and lend to a higher morbidity and or mortality risk. The patient was given the options of postponing their procedure. All of the risks, benefits, and alternatives were discussed. The patient does wish to proceed with the procedure. The risks and benefits as well as alternatives to the procedure have been discussed with the patient and or family. The patient and or next of kin understands and agrees to proceed.     Luana Matthews M.D.

## 2021-01-18 NOTE — PROGRESS NOTES
We received a remote transmission from patient's monitor at home. Remote Linq report shows known AF. Pt remains on coumadin. EP physician to review. We will continue to monitor remotely.

## 2021-01-21 ENCOUNTER — TELEPHONE (OUTPATIENT)
Dept: ORTHOPEDIC SURGERY | Age: 76
End: 2021-01-21

## 2021-01-25 ENCOUNTER — TELEPHONE (OUTPATIENT)
Dept: FAMILY MEDICINE CLINIC | Age: 76
End: 2021-01-25

## 2021-01-25 ENCOUNTER — ANTI-COAG VISIT (OUTPATIENT)
Dept: FAMILY MEDICINE CLINIC | Age: 76
End: 2021-01-25

## 2021-01-25 NOTE — TELEPHONE ENCOUNTER
Remote INR called to report out of range INR result. As of this morning, INR = 1.4.       Please advise

## 2021-02-01 ENCOUNTER — ANTI-COAG VISIT (OUTPATIENT)
Dept: FAMILY MEDICINE CLINIC | Age: 76
End: 2021-02-01

## 2021-02-01 LAB — INR BLD: 1.8

## 2021-02-02 ENCOUNTER — TELEPHONE (OUTPATIENT)
Dept: CARDIOLOGY CLINIC | Age: 76
End: 2021-02-02

## 2021-02-02 NOTE — TELEPHONE ENCOUNTER
Patient has an appt with SAHIL, LOI and ANDREI but there is a recall for DKW. Does she really need to see DKW also ?

## 2021-02-09 ENCOUNTER — ANTI-COAG VISIT (OUTPATIENT)
Dept: FAMILY MEDICINE CLINIC | Age: 76
End: 2021-02-09

## 2021-02-09 LAB — INR BLD: 2.6

## 2021-02-11 ENCOUNTER — NURSE TRIAGE (OUTPATIENT)
Dept: OTHER | Facility: CLINIC | Age: 76
End: 2021-02-11

## 2021-02-11 NOTE — TELEPHONE ENCOUNTER
[facility]  with red flag complaint. Brief description of triage: Pt reports having Dysuria, right flank pain, low back pain since yesterday. States urine is orange in color. Triage indicates for patient to have appt with PCP today or go to [facility]/ER if no appts available. Care advice provided, patient verbalizes understanding; denies any other questions or concerns; instructed to call back for any new or worsening symptoms. Writer provided warm transfer to [facility] at [facility] for appointment scheduling. Attention Provider: Thank you for allowing me to participate in the care of your patient. The patient was connected to triage in response to information provided to the ECC. Please do not respond through this encounter as the response is not directed to a shared pool. Reason for Disposition   Side (flank) or lower back pain present    Answer Assessment - Initial Assessment Questions  1. SYMPTOM: \"What's the main symptom you're concerned about? \" (e.g., frequency, incontinence)      Dysuria, right flank pain, low back pain    2. ONSET: \"When did the Dysuria, right flank pain, low back pain start? \"      Yesterday     3. PAIN: \"Is there any pain? \" If so, ask: \"How bad is it? \" (Scale: 1-10; mild, moderate, severe)      5/10    4. CAUSE: \"What do you think is causing the symptoms? \"      Pt believes UTI    5. OTHER SYMPTOMS: \"Do you have any other symptoms? \" (e.g., fever, flank pain, blood in urine, pain with urination)      Dysuria, right flank pain, low back pain, urine is orange in color. 6. PREGNANCY: \"Is there any chance you are pregnant? \" \"When was your last menstrual period? \"      N/A    Protocols used: URINARY SYMPTOMS-ADULT-[place]

## 2021-02-12 ENCOUNTER — TELEPHONE (OUTPATIENT)
Dept: FAMILY MEDICINE CLINIC | Age: 76
End: 2021-02-12

## 2021-02-12 ENCOUNTER — OFFICE VISIT (OUTPATIENT)
Dept: FAMILY MEDICINE CLINIC | Age: 76
End: 2021-02-12
Payer: MEDICARE

## 2021-02-12 VITALS
DIASTOLIC BLOOD PRESSURE: 72 MMHG | SYSTOLIC BLOOD PRESSURE: 110 MMHG | OXYGEN SATURATION: 97 % | TEMPERATURE: 97.1 F | HEART RATE: 78 BPM

## 2021-02-12 DIAGNOSIS — E11.65 TYPE 2 DIABETES MELLITUS WITH HYPERGLYCEMIA, WITH LONG-TERM CURRENT USE OF INSULIN (HCC): ICD-10-CM

## 2021-02-12 DIAGNOSIS — K52.81 EOSINOPHILIC GASTRITIS: ICD-10-CM

## 2021-02-12 DIAGNOSIS — R07.89 RIGHT-SIDED CHEST WALL PAIN: Primary | ICD-10-CM

## 2021-02-12 DIAGNOSIS — Z79.4 TYPE 2 DIABETES MELLITUS WITH HYPERGLYCEMIA, WITH LONG-TERM CURRENT USE OF INSULIN (HCC): ICD-10-CM

## 2021-02-12 DIAGNOSIS — R30.0 DYSURIA: ICD-10-CM

## 2021-02-12 LAB
BACTERIA URINE, POC: ABNORMAL
BILIRUBIN URINE: 0 MG/DL
BLOOD, URINE: NEGATIVE
CASTS URINE, POC: ABNORMAL
CLARITY: ABNORMAL
COLOR: YELLOW
CRYSTALS URINE, POC: ABNORMAL
EPI CELLS URINE, POC: ABNORMAL
GLUCOSE URINE: ABNORMAL
KETONES, URINE: POSITIVE
LEUKOCYTE EST, POC: ABNORMAL
NITRITE, URINE: NEGATIVE
PH UA: 5.5 (ref 4.5–8)
PROTEIN UA: NEGATIVE
RBC URINE, POC: ABNORMAL
SPECIFIC GRAVITY UA: 1.02 (ref 1–1.03)
UROBILINOGEN, URINE: NORMAL
WBC URINE, POC: ABNORMAL
YEAST URINE, POC: ABNORMAL

## 2021-02-12 PROCEDURE — 99214 OFFICE O/P EST MOD 30 MIN: CPT | Performed by: FAMILY MEDICINE

## 2021-02-12 PROCEDURE — 81000 URINALYSIS NONAUTO W/SCOPE: CPT | Performed by: FAMILY MEDICINE

## 2021-02-12 RX ORDER — PREDNISONE 20 MG/1
TABLET ORAL
Qty: 20 TABLET | Refills: 0 | Status: ON HOLD
Start: 2021-02-12 | End: 2021-02-26

## 2021-02-12 NOTE — TELEPHONE ENCOUNTER
She already has 10 mg prednisone pills at home.   The dosage should be 2 tablets 3 times a day for 4 days and 2 tablets twice daily for 4 days and then stop

## 2021-02-12 NOTE — PROGRESS NOTES
Subjective:      Patient ID: Rosette Dan is a 77 y.o. female. CC: Patient presents for acute medical problem-right rib pain, urinary discoloration and diabetes mellitus. . Medical assistant notes reviewed. HPI Patient presents with right lower back pain, urine is orangish looking, and dysuria since yesterday. Patient is had issues with right rib cage pain for some time. She had a recent T11 epidural injection and she did not feel that she got any improvement. She is have a lot of discomfort across the right back area. Most of this is located in the flank area predominantly. There is been no urinary frequency or urgency although yesterday she noticed her urine was very discolored in nature. She not able to take anti-inflammatory medication because she is on chronic Coumadin therapy. She is also complained of some upper abdominal fullness although she is not sure if it is related to her eosinophilic gastritis or not. She not take additional prednisone for some time. Review of Systems    Allergies   Allergen Reactions    Woyxrwng-Mxxgwwo-Txbata [Fluocinolone] Shortness Of Breath    Ciprofloxacin Shortness Of Breath    Diovan [Valsartan] Shortness Of Breath    Flagyl [Metronidazole] Shortness Of Breath     Has taken diflucan at home 12/7/15    Metformin And Related [Metformin And Related] Shortness Of Breath    Benazepril      Other reaction(s): Not Recorded    Morphine      Bad reaction. \"makes her feel horrible\".  Saxagliptin      Other reaction(s): Not Recorded    Levaquin [Levofloxacin] Rash     Objective:   Physical Exam  Constitutional:       General: She is not in acute distress. HENT:      Right Ear: Tympanic membrane normal.      Left Ear: Tympanic membrane normal.      Nose: Nose normal.      Mouth/Throat:      Pharynx: Uvula midline. Neck:      Musculoskeletal: Neck supple. Pulmonary:      Effort: Pulmonary effort is normal.      Breath sounds: Normal breath sounds.  No decreased breath sounds. Chest:      Chest wall: Tenderness (Right flank area) present. Abdominal:      General: Bowel sounds are normal. There is distension. Palpations: Abdomen is soft. Tenderness: There is abdominal tenderness in the epigastric area. Lymphadenopathy:      Cervical: No cervical adenopathy. Skin:     Findings: No rash. Neurological:      Mental Status: She is alert. Assessment:      Hung Velazquez was seen today for dysuria. Diagnoses and all orders for this visit:    Right-sided chest wall pain    Dysuria  -     POC URINE with Microscopic  -     Cancel: Culture, Urine  -     Culture, Urine    Type 2 diabetes mellitus with hyperglycemia, with long-term current use of insulin ([facility])  -     Hemoglobin A1C; Future    Eosinophilic gastritis    Other orders  -     predniSONE (DELTASONE) 20 MG tablet; 1 TID for 4 day then 1 BID for 4 days            Plan:      Difficult situation as she is unable take anti-inflammatory medications. Plan is discussed with patient and  would be to give her a prednisone burst and adjust her insulin as needed. Her hemoglobin A1c has been well controlled. Assuming this comes inflammation down I advised her to start using some diclofenac gel twice a day to the affected area. Plan to send urine off for culture    Abdominal discomfort may indeed be eosinophilic gastritis and will again we use the prednisone burst to calm this down. RTC at normal appointment time    Medical decision making of moderate complexity. Please note that this chart was generated using Dragon dictation software. Although every effort was made to ensure the accuracy of this automated transcription, some errors in transcription may have occurred.

## 2021-02-12 NOTE — TELEPHONE ENCOUNTER
----- Message from Isiah Going sent at 2/12/2021  9:35 AM EST -----  Subject: Message to Provider    QUESTIONS  Information for Provider? Patient would like to know how often to take   medication   already has script   but no instruction on how to take it   ---------------------------------------------------------------------------  --------------  [address]  What is the best way for the office to contact you? OK to leave message on   voicemail  Preferred Call Back Phone Number? [phone]  ---------------------------------------------------------------------------  --------------  SCRIPT ANSWERS  Relationship to Patient?  Self

## 2021-02-13 LAB — URINE CULTURE, ROUTINE: NORMAL

## 2021-02-15 ENCOUNTER — ANTI-COAG VISIT (OUTPATIENT)
Dept: FAMILY MEDICINE CLINIC | Age: 76
End: 2021-02-15

## 2021-02-15 LAB — INR BLD: 4

## 2021-02-17 RX ORDER — TORSEMIDE 100 MG/1
TABLET ORAL
Qty: 90 TABLET | Refills: 0 | Status: ON HOLD | OUTPATIENT
Start: 2021-02-17 | End: 2021-03-10 | Stop reason: HOSPADM

## 2021-02-17 NOTE — TELEPHONE ENCOUNTER
Medication:   Requested Prescriptions     Pending Prescriptions Disp Refills    torsemide (DEMADEX) 100 MG tablet [Pharmacy Med Name: TORSEMIDE TABS 100MG] 90 tablet 0     Sig: TAKE 1 TABLET DAILY      Last Filled:     Patient Phone Number: [phone] (home)     Last appt: 12/21/2020  Next appt: 3/23/2021    Last OARRS:   RX Monitoring 3/1/2019   Attestation The Prescription Monitoring Report for this patient was reviewed today.    Periodic Controlled Substance Monitoring -     PDMP Monitoring:    Last PDMP [facility] SYSTEM as Reviewed [facility]):  Review User Review Instant Review Result   Jemima Zamudio 12/28/2020 11:26 AM Reviewed PDMP [1]     Preferred Pharmacy:   [facility] [place]note 2] Johanna Shea, Joe Verma [address] [place] [address] [phone] - F [phone]  [place] [address]  Phone: [phone] Fax: [phone]    [facility] [address], [address] [place] [phone] Jeff Romeo [phone]  [address] [address]  [place] [address]  Phone: [phone] Fax: [phone]

## 2021-02-22 ENCOUNTER — TELEPHONE (OUTPATIENT)
Dept: FAMILY MEDICINE CLINIC | Age: 76
End: 2021-02-22

## 2021-02-22 ENCOUNTER — NURSE ONLY (OUTPATIENT)
Dept: CARDIOLOGY CLINIC | Age: 76
End: 2021-02-22
Payer: MEDICARE

## 2021-02-22 ENCOUNTER — ANTI-COAG VISIT (OUTPATIENT)
Dept: FAMILY MEDICINE CLINIC | Age: 76
End: 2021-02-22

## 2021-02-22 DIAGNOSIS — I48.0 PAF (PAROXYSMAL ATRIAL FIBRILLATION) (HCC): ICD-10-CM

## 2021-02-22 DIAGNOSIS — I47.1 PAROXYSMAL SVT (SUPRAVENTRICULAR TACHYCARDIA) (HCC): ICD-10-CM

## 2021-02-22 DIAGNOSIS — Z45.09 ENCOUNTER FOR ELECTRONIC ANALYSIS OF REVEAL EVENT RECORDER: ICD-10-CM

## 2021-02-22 LAB — INR BLD: 2.8

## 2021-02-22 PROCEDURE — 93298 REM INTERROG DEV EVAL SCRMS: CPT | Performed by: INTERNAL MEDICINE

## 2021-02-22 PROCEDURE — G2066 INTER DEVC REMOTE 30D: HCPCS | Performed by: INTERNAL MEDICINE

## 2021-02-22 NOTE — LETTER
[address] [facility] [phone]  [address] [facility] [phone]    Pacemaker/Defibrillator Clinic          02/22/21        Ga Patel  [address] [address]        Dear Ga Patel    This letter is to inform you that we received the transmission from your monitor at home that checks your implanted heart device. The next date your monitor will automatically transmit will be 4-5-21. If your report needs attention we will notify you. Your device and monitor are wireless and most transmit cellularly, but please periodically check your monitor is still plugged in to the electrical outlet. If you still use the telephone land line to send please ensure the connection to the phone abel is secure. This will help to ensure successful automatic transmissions in the future. Also, the monitor needs to be close to you while sleeping at night. Please be aware that the remote device transmission sites are periodically monitored only during regular business hours during which simultaneous in-office device clinics are being run. If your transmission requires attention, we will contact you as soon as possible. Thank you.             Beto Medina

## 2021-02-22 NOTE — PROGRESS NOTES
We received a remote transmission from patient's monitor at home. Remote Linq report shows AF. Pt remains on coumadin. EP physician to review. We will continue to monitor remotely.

## 2021-02-26 ENCOUNTER — OFFICE VISIT (OUTPATIENT)
Dept: CARDIOLOGY CLINIC | Age: 76
End: 2021-02-26
Payer: MEDICARE

## 2021-02-26 ENCOUNTER — APPOINTMENT (OUTPATIENT)
Dept: GENERAL RADIOLOGY | Age: 76
DRG: 393 | End: 2021-02-26
Attending: INTERNAL MEDICINE
Payer: MEDICARE

## 2021-02-26 ENCOUNTER — HOSPITAL ENCOUNTER (INPATIENT)
Age: 76
LOS: 12 days | Discharge: HOME | DRG: 393 | End: 2021-03-10
Attending: INTERNAL MEDICINE | Admitting: INTERNAL MEDICINE
Payer: MEDICARE

## 2021-02-26 ENCOUNTER — TELEPHONE (OUTPATIENT)
Dept: CARDIOLOGY CLINIC | Age: 76
End: 2021-02-26

## 2021-02-26 ENCOUNTER — HOSPITAL ENCOUNTER (OUTPATIENT)
Dept: ONCOLOGY | Age: 76
Setting detail: INFUSION SERIES
End: 2021-02-26
Payer: MEDICARE

## 2021-02-26 VITALS
SYSTOLIC BLOOD PRESSURE: 100 MMHG | HEIGHT: 68 IN | BODY MASS INDEX: 32.28 KG/M2 | HEART RATE: 66 BPM | WEIGHT: 213 LBS | DIASTOLIC BLOOD PRESSURE: 67 MMHG | OXYGEN SATURATION: 95 % | RESPIRATION RATE: 20 BRPM

## 2021-02-26 DIAGNOSIS — R06.02 SHORTNESS OF BREATH: ICD-10-CM

## 2021-02-26 DIAGNOSIS — I48.0 PAF (PAROXYSMAL ATRIAL FIBRILLATION) (HCC): ICD-10-CM

## 2021-02-26 DIAGNOSIS — Z95.1 S/P CABG X 3: ICD-10-CM

## 2021-02-26 DIAGNOSIS — I50.23 ACUTE ON CHRONIC SYSTOLIC HEART FAILURE DUE TO VALVULAR DISEASE (HCC): Primary | ICD-10-CM

## 2021-02-26 DIAGNOSIS — I35.0 NONRHEUMATIC AORTIC VALVE STENOSIS: ICD-10-CM

## 2021-02-26 DIAGNOSIS — I38 ACUTE ON CHRONIC SYSTOLIC HEART FAILURE DUE TO VALVULAR DISEASE (HCC): Primary | ICD-10-CM

## 2021-02-26 DIAGNOSIS — O22.30 DVT (DEEP VEIN THROMBOSIS) IN PREGNANCY: ICD-10-CM

## 2021-02-26 PROBLEM — I50.43 CHF (CONGESTIVE HEART FAILURE), NYHA CLASS I, ACUTE ON CHRONIC, COMBINED (HCC): Status: ACTIVE | Noted: 2021-02-26

## 2021-02-26 LAB
A/G RATIO: 1.1 (ref 1.1–2.2)
ALBUMIN SERPL-MCNC: 3.1 G/DL (ref 3.4–5)
ALP BLD-CCNC: 143 U/L (ref 40–129)
ALT SERPL-CCNC: 13 U/L (ref 10–40)
ANION GAP SERPL CALCULATED.3IONS-SCNC: 10 MMOL/L (ref 3–16)
AST SERPL-CCNC: 15 U/L (ref 15–37)
BILIRUB SERPL-MCNC: 0.4 MG/DL (ref 0–1)
BUN BLDV-MCNC: 41 MG/DL (ref 7–20)
CALCIUM SERPL-MCNC: 8.6 MG/DL (ref 8.3–10.6)
CHLORIDE BLD-SCNC: 97 MMOL/L (ref 99–110)
CO2: 29 MMOL/L (ref 21–32)
CREAT SERPL-MCNC: 1.5 MG/DL (ref 0.6–1.2)
EKG ATRIAL RATE: 105 BPM
EKG DIAGNOSIS: NORMAL
EKG Q-T INTERVAL: 322 MS
EKG QRS DURATION: 90 MS
EKG QTC CALCULATION (BAZETT): 433 MS
EKG R AXIS: 27 DEGREES
EKG T AXIS: 135 DEGREES
EKG VENTRICULAR RATE: 109 BPM
GFR AFRICAN AMERICAN: 41
GFR NON-AFRICAN AMERICAN: 34
GLOBULIN: 2.8 G/DL
GLUCOSE BLD-MCNC: 116 MG/DL (ref 70–99)
GLUCOSE BLD-MCNC: 143 MG/DL (ref 70–99)
GLUCOSE BLD-MCNC: 153 MG/DL (ref 70–99)
GLUCOSE BLD-MCNC: 191 MG/DL (ref 70–99)
HCT VFR BLD CALC: 38.2 % (ref 36–48)
HEMOGLOBIN: 12.1 G/DL (ref 12–16)
INR BLD: 4.29 (ref 0.86–1.14)
MAGNESIUM: 2.1 MG/DL (ref 1.8–2.4)
MCH RBC QN AUTO: 27.6 PG (ref 26–34)
MCHC RBC AUTO-ENTMCNC: 31.8 G/DL (ref 31–36)
MCV RBC AUTO: 86.8 FL (ref 80–100)
PDW BLD-RTO: 17.1 % (ref 12.4–15.4)
PERFORMED ON: ABNORMAL
PLATELET # BLD: 205 K/UL (ref 135–450)
PMV BLD AUTO: 8.5 FL (ref 5–10.5)
POTASSIUM REFLEX MAGNESIUM: 3.5 MMOL/L (ref 3.5–5.1)
PRO-BNP: 5497 PG/ML (ref 0–449)
PROTHROMBIN TIME: 50.5 SEC (ref 10–13.2)
RBC # BLD: 4.4 M/UL (ref 4–5.2)
SODIUM BLD-SCNC: 136 MMOL/L (ref 136–145)
TOTAL PROTEIN: 5.9 G/DL (ref 6.4–8.2)
WBC # BLD: 8 K/UL (ref 4–11)

## 2021-02-26 PROCEDURE — 2580000003 HC RX 258: Performed by: INTERNAL MEDICINE

## 2021-02-26 PROCEDURE — 6360000002 HC RX W HCPCS: Performed by: INTERNAL MEDICINE

## 2021-02-26 PROCEDURE — 85027 COMPLETE CBC AUTOMATED: CPT

## 2021-02-26 PROCEDURE — 71045 X-RAY EXAM CHEST 1 VIEW: CPT

## 2021-02-26 PROCEDURE — 1200000000 HC SEMI PRIVATE

## 2021-02-26 PROCEDURE — 99215 OFFICE O/P EST HI 40 MIN: CPT | Performed by: CLINICAL NURSE SPECIALIST

## 2021-02-26 PROCEDURE — 83880 ASSAY OF NATRIURETIC PEPTIDE: CPT

## 2021-02-26 PROCEDURE — 6370000000 HC RX 637 (ALT 250 FOR IP): Performed by: INTERNAL MEDICINE

## 2021-02-26 PROCEDURE — 83735 ASSAY OF MAGNESIUM: CPT

## 2021-02-26 PROCEDURE — 99223 1ST HOSP IP/OBS HIGH 75: CPT | Performed by: INTERNAL MEDICINE

## 2021-02-26 PROCEDURE — 93010 ELECTROCARDIOGRAM REPORT: CPT | Performed by: INTERNAL MEDICINE

## 2021-02-26 PROCEDURE — 93005 ELECTROCARDIOGRAM TRACING: CPT | Performed by: INTERNAL MEDICINE

## 2021-02-26 PROCEDURE — 80053 COMPREHEN METABOLIC PANEL: CPT

## 2021-02-26 PROCEDURE — 36415 COLL VENOUS BLD VENIPUNCTURE: CPT

## 2021-02-26 PROCEDURE — 85610 PROTHROMBIN TIME: CPT

## 2021-02-26 PROCEDURE — 94760 N-INVAS EAR/PLS OXIMETRY 1: CPT

## 2021-02-26 PROCEDURE — 94761 N-INVAS EAR/PLS OXIMETRY MLT: CPT

## 2021-02-26 RX ORDER — GABAPENTIN 300 MG/1
300 CAPSULE ORAL NIGHTLY
Status: DISCONTINUED | OUTPATIENT
Start: 2021-02-26 | End: 2021-03-10 | Stop reason: HOSPADM

## 2021-02-26 RX ORDER — FUROSEMIDE 10 MG/ML
40 INJECTION INTRAMUSCULAR; INTRAVENOUS 2 TIMES DAILY
Status: DISCONTINUED | OUTPATIENT
Start: 2021-02-26 | End: 2021-02-26

## 2021-02-26 RX ORDER — PROMETHAZINE HYDROCHLORIDE 25 MG/1
12.5 TABLET ORAL EVERY 6 HOURS PRN
Status: DISCONTINUED | OUTPATIENT
Start: 2021-02-26 | End: 2021-03-10 | Stop reason: HOSPADM

## 2021-02-26 RX ORDER — ONDANSETRON 2 MG/ML
4 INJECTION INTRAMUSCULAR; INTRAVENOUS EVERY 6 HOURS PRN
Status: DISCONTINUED | OUTPATIENT
Start: 2021-02-26 | End: 2021-03-10 | Stop reason: HOSPADM

## 2021-02-26 RX ORDER — FUROSEMIDE 10 MG/ML
40 INJECTION INTRAMUSCULAR; INTRAVENOUS ONCE
Status: COMPLETED | OUTPATIENT
Start: 2021-02-26 | End: 2021-02-26

## 2021-02-26 RX ORDER — DEXTROSE MONOHYDRATE 50 MG/ML
100 INJECTION, SOLUTION INTRAVENOUS PRN
Status: DISCONTINUED | OUTPATIENT
Start: 2021-02-26 | End: 2021-03-10 | Stop reason: HOSPADM

## 2021-02-26 RX ORDER — MONTELUKAST SODIUM 10 MG/1
10 TABLET ORAL NIGHTLY
Status: DISCONTINUED | OUTPATIENT
Start: 2021-02-26 | End: 2021-03-10 | Stop reason: HOSPADM

## 2021-02-26 RX ORDER — ACETAMINOPHEN 650 MG/1
650 SUPPOSITORY RECTAL EVERY 6 HOURS PRN
Status: DISCONTINUED | OUTPATIENT
Start: 2021-02-26 | End: 2021-03-10 | Stop reason: HOSPADM

## 2021-02-26 RX ORDER — SODIUM CHLORIDE 0.9 % (FLUSH) 0.9 %
10 SYRINGE (ML) INJECTION EVERY 12 HOURS SCHEDULED
Status: DISCONTINUED | OUTPATIENT
Start: 2021-02-26 | End: 2021-03-10 | Stop reason: HOSPADM

## 2021-02-26 RX ORDER — INSULIN LISPRO 100 [IU]/ML
0-12 INJECTION, SOLUTION INTRAVENOUS; SUBCUTANEOUS
Status: DISCONTINUED | OUTPATIENT
Start: 2021-02-26 | End: 2021-03-02 | Stop reason: DRUGHIGH

## 2021-02-26 RX ORDER — WARFARIN SODIUM 5 MG/1
5 TABLET ORAL DAILY
Status: DISCONTINUED | OUTPATIENT
Start: 2021-02-26 | End: 2021-02-26

## 2021-02-26 RX ORDER — SPIRONOLACTONE 25 MG/1
50 TABLET ORAL DAILY
Status: DISCONTINUED | OUTPATIENT
Start: 2021-02-26 | End: 2021-02-28

## 2021-02-26 RX ORDER — FUROSEMIDE 10 MG/ML
40 INJECTION INTRAMUSCULAR; INTRAVENOUS 2 TIMES DAILY
Status: DISCONTINUED | OUTPATIENT
Start: 2021-02-26 | End: 2021-02-28

## 2021-02-26 RX ORDER — PANTOPRAZOLE SODIUM 40 MG/1
40 TABLET, DELAYED RELEASE ORAL
Status: DISCONTINUED | OUTPATIENT
Start: 2021-02-27 | End: 2021-03-02

## 2021-02-26 RX ORDER — POLYETHYLENE GLYCOL 3350 17 G/17G
17 POWDER, FOR SOLUTION ORAL DAILY PRN
Status: DISCONTINUED | OUTPATIENT
Start: 2021-02-26 | End: 2021-03-10 | Stop reason: HOSPADM

## 2021-02-26 RX ORDER — ACETAMINOPHEN 325 MG/1
650 TABLET ORAL EVERY 6 HOURS PRN
Status: DISCONTINUED | OUTPATIENT
Start: 2021-02-26 | End: 2021-03-10 | Stop reason: HOSPADM

## 2021-02-26 RX ORDER — INSULIN LISPRO 100 [IU]/ML
0-6 INJECTION, SOLUTION INTRAVENOUS; SUBCUTANEOUS NIGHTLY
Status: DISCONTINUED | OUTPATIENT
Start: 2021-02-26 | End: 2021-03-02 | Stop reason: DRUGHIGH

## 2021-02-26 RX ORDER — NICOTINE POLACRILEX 4 MG
15 LOZENGE BUCCAL PRN
Status: DISCONTINUED | OUTPATIENT
Start: 2021-02-26 | End: 2021-03-10 | Stop reason: HOSPADM

## 2021-02-26 RX ORDER — DEXTROSE MONOHYDRATE 25 G/50ML
12.5 INJECTION, SOLUTION INTRAVENOUS PRN
Status: DISCONTINUED | OUTPATIENT
Start: 2021-02-26 | End: 2021-03-10 | Stop reason: HOSPADM

## 2021-02-26 RX ORDER — OXYCODONE HYDROCHLORIDE 5 MG/1
10 TABLET ORAL
Status: DISCONTINUED | OUTPATIENT
Start: 2021-02-26 | End: 2021-03-10 | Stop reason: HOSPADM

## 2021-02-26 RX ORDER — ALBUTEROL SULFATE 90 UG/1
2 AEROSOL, METERED RESPIRATORY (INHALATION) EVERY 6 HOURS PRN
Status: DISCONTINUED | OUTPATIENT
Start: 2021-02-26 | End: 2021-03-10 | Stop reason: HOSPADM

## 2021-02-26 RX ORDER — METOLAZONE 2.5 MG/1
2.5 TABLET ORAL DAILY
Status: DISCONTINUED | OUTPATIENT
Start: 2021-02-26 | End: 2021-02-28

## 2021-02-26 RX ORDER — PREDNISONE 10 MG/1
10 TABLET ORAL DAILY
Status: ON HOLD | COMMUNITY
End: 2021-03-10 | Stop reason: HOSPADM

## 2021-02-26 RX ORDER — SODIUM CHLORIDE 0.9 % (FLUSH) 0.9 %
10 SYRINGE (ML) INJECTION PRN
Status: DISCONTINUED | OUTPATIENT
Start: 2021-02-26 | End: 2021-03-10 | Stop reason: HOSPADM

## 2021-02-26 RX ADMIN — FUROSEMIDE 40 MG: 10 INJECTION, SOLUTION INTRAMUSCULAR; INTRAVENOUS at 15:49

## 2021-02-26 RX ADMIN — FUROSEMIDE 40 MG: 10 INJECTION, SOLUTION INTRAVENOUS at 21:07

## 2021-02-26 RX ADMIN — Medication 10 ML: at 21:07

## 2021-02-26 RX ADMIN — MONTELUKAST SODIUM 10 MG: 10 TABLET, FILM COATED ORAL at 21:07

## 2021-02-26 RX ADMIN — ACETAMINOPHEN 650 MG: 325 TABLET ORAL at 17:39

## 2021-02-26 RX ADMIN — GABAPENTIN 300 MG: 300 CAPSULE ORAL at 21:07

## 2021-02-26 RX ADMIN — INSULIN LISPRO 2 UNITS: 100 INJECTION, SOLUTION INTRAVENOUS; SUBCUTANEOUS at 18:46

## 2021-02-26 RX ADMIN — OXYCODONE 10 MG: 5 TABLET ORAL at 16:00

## 2021-02-26 ASSESSMENT — PAIN SCALES - GENERAL
PAINLEVEL_OUTOF10: 5
PAINLEVEL_OUTOF10: 9
PAINLEVEL_OUTOF10: 0

## 2021-02-26 ASSESSMENT — PAIN DESCRIPTION - DESCRIPTORS
DESCRIPTORS: SHARP
DESCRIPTORS: ACHING

## 2021-02-26 ASSESSMENT — PAIN DESCRIPTION - LOCATION: LOCATION: BACK

## 2021-02-26 ASSESSMENT — PAIN DESCRIPTION - ORIENTATION: ORIENTATION: LOWER;MID

## 2021-02-26 NOTE — PROGRESS NOTES
Clinical Pharmacy Note: Pharmacy to Dose Warfarin    Pharmacy consulted by Dr. Woodrow Haywood to dose warfarin. Brody Lindo is a 77 y.o. female  is receiving warfarin for indication: Afib. INR Goal Range: 2-3  Prior to admission warfarin dosing regimen: 5 mg daily  INR today:   Lab Results   Component Value Date    INR 2.80 02/22/2021       Assessment/Plan:  INR is pending. Based on today's assessment, dose warfarin 5 mg daily if INR is not supratherapeutic. Daily INR is ordered. Pharmacy will continue to monitor and make adjustments to regimen as necessary.      Thank you for the consult,     Oscar Lomax, PharmD, [address] [facility] Pharmacist  [id number]

## 2021-02-26 NOTE — PROGRESS NOTES
Maryan 70 1945    History:  Past Medical History:   Diagnosis Date    Asthma     Atrial fibrillation (HCC)     Eosinophilia     Hemoptysis     HIGH CHOLESTEROL     Hx of blood clots     Hypertension     Irregular heart beat     Other specified gastritis without mention of hemorrhage     Palpitations     Skin cancer     basal and squamous    Type II or unspecified type diabetes mellitus without mention of complication, not stated as uncontrolled        ECHO:  Summary   Dobutamine echocardiogram for aortic stenosis. Very technically limited exam due to atrial fibrillation. Baseline echocardiogram shows severe left ventricular dysfunction with   anterior, lateral and apical hypokinesis with an ejection fraction of 20-25   %. There is no improvement in wall motion with low or high dose dobutamine   suggestive of nonviable myocardium. Mild prosthetic aortic valve stenosis with a mean gradient of 16mmHg and   peak velocity of 2.47m/s at rest. After dobutamine stress the mean AV   gradient rises to 20mmHg with 20mcg of dobutamine consistent with mild to   moderate aortic stenosis. Prosthetic mitral valve with a mean gradient of 7mmHg       ACE/ARB: has ARB allergy   BB: coreg 25 mg bid  Aldosterone Antagonist: aldactone 50 mg daily    History of sleep apnea: no  Coleman Screen ordered: yes    DM History: Yes  DM medications: insulin sliding scale, lantus 50 units nightly    Last Hospital Admission: 5/10/20 with  Pneumatosis coli  Code Status: full code  Discharge plans: from home    Family Present: autumn Myers was admitted to the hospital with increased shortness of breath after urgently being seen in CHF clinic. Patient follows with HF team as an outpatient. She is unsure of her baseline. Feels it may be around 200 lb. On admission 211 lb. Patient has not been weighing herself daily.  She notes lower leg edema, and abdomen fullness. She states she does not use extra added salt and has been restricting sodium \"for a long\" and feels comfortable about following a sodium restriction. She recognizes that she needs to adhere to fluid restriction of 64 oz but does not measure her intake. Encouraged utilizing nursing and admission to help with tracking fluid intake. She is compliant with medications and follow ups. She is not very active at home. Patient provided with both written and verbal education on CHF signs/ symptoms, causes, discharge medications, daily weights, low sodium diet, activity, and follow-up. Pt to call if gains 3 pounds in one day or 5 pounds in one week. Mutually agreed upon goals were discussed such as calling the MD as soon as they recognize symptoms and weight gain, maintaining his proper diet, taking medications as prescribed, joining rehab when able. Patient provided with CHF Zone Management tool and CHF symptoms magnet. Discussed importance of lifestyle changes: patient encouraged to weigh daily    PATIENT/CAREGIVER TEACHING:    Level of patient/caregiver understanding able to:   [x ] Verbalize understanding [ ] Demonstrate understanding [ ] Teach back   [ ] Needs reinforcement [ ] Other:       Time spent teachin mins    1. WEIGHT: Admit Weight: 211 lb (95.7 kg)      Today  Weight: 211 lb (95.7 kg)   2. I/O No intake or output data in the 24 hours ending 21 1500    Recommendations:   1. She will need a one week hospital follow up at discharge. 2. Educate further on fluid restriction 48 oz- 64 oz during inpatient stay so she can understand how to measure intake at home. 3. Continue to educate on S/S.   4. Emphasize daily weights, diet, and knowing when and who to call  5. Provided patient with CHF Resource Line for questions and concerns.        VI SHRESTHA 2021 3:00 PM

## 2021-02-26 NOTE — PROGRESS NOTES
[facility]  Progress Note    Primary Care Doctor:  Christen Jackson MD    Chief Complaint   Patient presents with    Follow-up    Edema        History of Present Illness:  77 y.o. female with history of CAD/CABG in 2018, PAF with maze , HTN, HLD, DVT/PE on coumadin, 2 CVs unsuccessful    I had the pleasure of seeing Brody Lindo in follow up for urgent visit for sob and increased swelling. She is in a wheel chair and with her  today. Her weight is up 13 pounds. Her weights at home 198->212 She takes metolazone 2.5 mg twice a week and last one was on Tuesday. She has not missed any of her medications and takes torsemide 100 mg plus aldactone 50 mg daily. Her abdomen is firm and distended along with swelling from ankles to mid thighs. No ace/arb due to soft BP. Her loop recorder was checked remotely on  and no increased AF rates noted. She received her first covid vaccine and is to receive the second one on . Past Medical History:   has a past medical history of Asthma, Atrial fibrillation ([address] Eosinophilia, Hemoptysis, HIGH CHOLESTEROL, Hx of blood clots, Hypertension, Irregular heart beat, Other specified gastritis without mention of hemorrhage, Palpitations, Skin cancer, and Type II or unspecified type diabetes mellitus without mention of complication, not stated as uncontrolled. Surgical History:   has a past surgical history that includes Cholecystectomy;  section; Colonoscopy (2017); skin biopsy; bronchoscopy (2016); Coronary artery bypass graft (2018); Mitral valve replacement (2018); transesophageal echocardiogram (2018); Tunneled venous catheter placement (Left, 2018); Cardiac catheterization (2018); Insertable Cardiac Monitor (Left, 2018); Colonoscopy (2014); Hysterectomy; Upper gastrointestinal endoscopy (N/A, 10/10/2018); Colonoscopy (10/10/2018); Colonoscopy (N/A, 2020);  Pain management procedure (N/A, 12/18/2020); and Pain management procedure (Bilateral, 1/18/2021). Social History:   reports that she has never smoked. She has never used smokeless tobacco. She reports that she does not drink alcohol or use drugs. Family History:   Family History   Problem Relation Age of Onset    Cancer Father     Asthma Mother     Hypertension Mother     Heart Disease Mother     High Blood Pressure Mother        Home Medications:  Prior to Admission medications    Medication Sig Start Date End Date Taking? Authorizing Provider   torsemide (DEMADEX) 100 MG tablet TAKE 1 TABLET DAILY 2/17/21  Yes Valeria Flowers MD   predniSONE (DELTASONE) 20 MG tablet 1 TID for 4 day then 1 BID for 4 days 2/12/21  Yes Valeria Flowers MD   OXYCODONE HCL PO Take by mouth As of 1/21/2021,  states patient is taking 10mg with edge being taken off her pain after 3 hours. Yes Historical Provider, MD   ACETAMINOPHEN PO Take by mouth   Yes Historical Provider, MD   blood glucose test strips (FREESTYLE LITE) strip USE TO TEST FOUR TIMES A DAY 1/5/21  Yes Valeria Flowers MD   predniSONE (DELTASONE) 10 MG tablet Take 10 mg by mouth as needed (sob with asthma, back pain, and eoencinaphilia)   Yes Historical Provider, MD   methocarbamol (ROBAXIN) 750 MG tablet Take 750 mg by mouth as needed (back pain)   Yes Historical Provider, MD   spironolactone (ALDACTONE) 50 MG tablet TAKE 1 TABLET DAILY 1/4/21  Yes Chino Blake MD   metOLazone (ZAROXOLYN) 2.5 MG tablet Take 1 tablet by mouth daily  Patient taking differently: Take 2.5 mg by mouth  12/21/20  Yes Valeria Flowers MD   gabapentin (NEURONTIN) 300 MG capsule Take 1 capsule by mouth nightly for 90 days.  Intended supply: 30 days 12/14/20 3/14/21 Yes MUNDO Lopes   albuterol sulfate  (90 Base) MCG/ACT inhaler USE 2 INHALATIONS EVERY 6 HOURS AS NEEDED FOR WHEEZING 11/17/20  Yes Valeria Flowers MD   carvedilol (COREG) 25 MG tablet Take 1 tablet by mouth 2 times daily 11/12/20  Yes Shira Carney MD   potassium chloride (KLOR-CON M) 10 MEQ extended release tablet TAKE 1 TABLET DAILY 9/23/20  Yes Jorge Zepeda MD   insulin glargine (LANTUS SOLOSTAR) 100 UNIT/ML injection pen INJECT 50 UNITS IN THE MORNING AND 24 UNITS AT BEDTIME  Patient taking differently: every morning 30 units in the morning and PRN at night if needed 7/10/20  Yes Jorge Zepeda MD   albuterol (PROVENTIL) (2.5 MG/3ML) 0.083% nebulizer solution Take 3 mLs by nebulization every 6 hours as needed for Wheezing 6/2/20  Yes Jorge Zepeda MD   exenatide (BYETTA 10 MCG PEN) 10 MCG/0.04ML injection Inject 0.04 mLs into the skin daily  Patient taking differently: Inject 10 mcg into the skin 2 times daily (with meals)  5/29/20  Yes Jorge Zepeda MD   polyethylene glycol (GLYCOLAX) 17 GM/SCOOP powder Take 17 g by mouth every other day    Yes Historical Provider, MD   omeprazole (PRILOSEC) 20 MG delayed release capsule Take 20 mg by mouth daily   Yes Historical Provider, MD   FreeStyle Lancets MISC 1 each by Does not apply route 4 times daily 4/29/20  Yes Jorge Zepeda MD   ondansetron (ZOFRAN) 4 MG tablet Take 1 tablet by mouth 3 times daily as needed for Nausea or Vomiting 3/26/20  Yes Jorge Zepeda MD   warfarin (COUMADIN) 5 MG tablet TAKE 1 TABLET DAILY  Patient taking differently: Take 5 mg by mouth daily Managed by PCP 2/26/20  Yes Yasmany Evans MD   montelukast (SINGULAIR) 10 MG tablet TAKE 1 TABLET NIGHTLY 2/26/20  Yes Yasmany Evans MD   Blood Glucose Monitoring Suppl (EMBRACE PRO GLUCOSE METER) GAETANO 1 Device by Does not apply route 4 times daily 2/4/20  Yes Jorge Zepeda MD   HUMALOG KWIKPEN 100 UNIT/ML SOPN TAKE AS INSTRUCTED BY YOUR PRESCRIBER  Patient taking differently:  Only if high blood sugar-has not been using 12/30/19  Yes Jorge Zepeda MD   nystatin (MYCOSTATIN) 593618 UNIT/ML suspension TAKE ONE TEASPOONFUL BY MOUTH FOUR TIMES A DAY FOR 5 DAYS 11/20/19  Yes Gavin Gutierrez MD   aspirin 81 MG chewable tablet Take 1 tablet by mouth daily 6/7/19  Yes Gavin Gutierrez MD   BD PEN NEEDLE JANNET U/F 32G X 4 MM MISC USE 1 PEN NEEDLE FOUR TIMES A DAY 3/22/19  Yes Gavin Gutierrez MD   vitamin B-12 500 MCG tablet Take 1 tablet by mouth daily 10/12/18  Yes Tod Edwards MD   Blood Glucose Monitoring Suppl (FREESTYLE LITE) GAETANO 1 Device by Does not apply route 4 times daily Patient to check blood sugar 4 times a day and PRN, she is treated with multiple daily injections of insulin that require correction dosing ;A1C 8.1 ; ICD code-E11.65 ,Z79.4  Patient taking differently: 1 Device by Does not apply route 2 times daily  9/4/18  Yes SOLEDAD Monzon - CNP        Allergies:  [id number] [fluocinolone], Ciprofloxacin, Diovan [valsartan], Flagyl [metronidazole], Metformin and related [metformin and related], Benazepril, Morphine, Saxagliptin, and Levaquin [levofloxacin]     Review of Systems:   · Constitutional: there has been no unanticipated weight loss. There's been no change in energy level, sleep pattern, or activity level. · Eyes: No visual changes or diplopia. No scleral icterus. · ENT: No Headaches, hearing loss or vertigo. No mouth sores or sore throat. · Cardiovascular: Reviewed in HPI  · Respiratory: No cough or wheezing, no sputum production. No hematemesis. · Gastrointestinal: No abdominal pain, appetite loss, blood in stools. No change in bowel or bladder habits. · Genitourinary: No dysuria, trouble voiding, or hematuria. · Musculoskeletal:  No gait disturbance, weakness or joint complaints. · Integumentary: No rash or pruritis. · Neurological: No headache, diplopia, change in muscle strength, numbness or tingling. No change in gait, balance, coordination, mood, affect, memory, mentation, behavior. · Psychiatric: No anxiety, no depression.   · Endocrine: No malaise, fatigue or temperature intolerance. No excessive thirst, fluid intake, or urination. No tremor. · Hematologic/Lymphatic: No abnormal bruising or bleeding, blood clots or swollen lymph nodes. · Allergic/Immunologic: No nasal congestion or hives. Physical Examination:    Vitals:    02/26/21 1101   BP: 100/67   Site: Left Upper Arm   Position: Sitting   Cuff Size: Large Adult   Pulse: 66   Resp: 20   SpO2: 95%   Weight: 213 lb (96.6 kg)   Height: 5' 8\" (1.727 m)        Constitutional and General Appearance: Warm and dry, no apparent distress, normal coloration  HEENT:  Normocephalic, atraumatic  Respiratory:  · Normal excursion and expansion without use of accessory muscles  · Resp Auscultation: Normal breath sounds without dullness  Cardiovascular:  · The apical impulses not displaced  · Heart tones are crisp and normal  · JVP + cm H2O  · irregular rate and rhythm  · Peripheral pulses are symmetrical and full  · There is no clubbing, cyanosis of the extremities.   · Ankle to mid thigh edema bilaterally and abdomen firm and distended  · Pedal Pulses: 2+ and equal   Abdomen: distended and firm  · No masses or tenderness  · Liver/Spleen: No Abnormalities Noted  Neurological/Psychiatric:  · Alert and oriented in all spheres  · Moves all extremities well  · Exhibits normal gait balance and coordination  · No abnormalities of mood, affect, memory, mentation, or behavior are noted    Lab Data:    CBC:   Lab Results   Component Value Date    WBC 4.8 12/23/2020    WBC 5.0 11/30/2020    WBC 4.1 09/02/2020    RBC 3.96 12/23/2020    RBC 4.15 11/30/2020    RBC 3.96 09/02/2020    HGB 11.1 12/23/2020    HGB 11.8 11/30/2020    HGB 10.7 09/02/2020    HCT 34.6 12/23/2020    HCT 36.5 11/30/2020    HCT 33.8 09/02/2020    MCV 87.2 12/23/2020    MCV 87.9 11/30/2020    MCV 85.4 09/02/2020    RDW 17.1 12/23/2020    RDW 17.0 11/30/2020    RDW 15.4 09/02/2020     12/23/2020     11/30/2020     09/02/2020     BMP:  Lab Results   Component Value Date     2020     12/15/2020     2020    K 3.6 2020    K 3.7 12/15/2020    K 3.6 2020    K 4.3 2020    K 4.0 2019    K 4.2 2019     2020     12/15/2020     2020    CO2 31 2020    CO2 30 12/15/2020    CO2 26 2020    PHOS 3.7 2020    PHOS 3.8 2020    PHOS 4.7 2019    BUN 31 2020    BUN 30 12/15/2020    BUN 28 2020    CREATININE 1.3 2020    CREATININE 1.4 12/15/2020    CREATININE 1.3 2020     BNP:   Lab Results   Component Value Date    PROBNP 4,321 12/15/2020    PROBNP 4,640 2020    PROBNP 9,318 05/10/2020     [facility] Dobutamine Echo   Dobutamine echocardiogram for aortic stenosis.   Very technically limited exam due to atrial fibrillation.   Baseline echocardiogram shows severe left ventricular dysfunction with   anterior, lateral and apical hypokinesis with an ejection fraction of 20-25%.   There is no improvement in wall motion with low or high dose dobutamine   suggestive of nonviable myocardium.      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 After dobutamine stress the mean AV   gradient rises to 20mmHg with 20mcg of dobutamine consistent with mild to   moderate aortic stenosis. Prosthetic mitral valve with a mean gradient of 7mmHg        JUDE 10/21/2020  Mildly dilated left ventricular size and normal wall thickness. Global left ventricular function is severely decreased with ejection fraction estimated at 25%. Patient is in atrial fibrillation during procedure.      The bioprosthetic mitral valve is well seated with a mean gradient of 5mmHg and maximum pressure gradient of 15 mmHg with heart rate of 89 bpm. Pressure half time of 82 ms. There is trivial mitral regurgitation.      Left atrial enlargement.  Spontaneous echo contrast seen in the left atrium. A large stump of the left atrial appendage with no thrombus noted. Patient has history of surgical ligation of the left atrial appendage. There are spontaneous echo contrast in the atrial appendage.      The aortic valve is thickened/calcified with decreased leaflet mobility consistent with aortic stenosis. There is trivial aortic insufficiency.      There is moderate tricuspid regurgitation.     ECHO 9/15/20  Left ventricular cavity size is dilated. There is normal wall thickness with a moderately severe reduction in   systolic function.   LV ejection fraction is visually estimated to be 25-30%.   Indeterminate diastolic function.   The right ventricle is not well visualized but appears to be normal in size with moderately reduced function.   The left atrium is moderately dilated.   A bioprosthetic mitral valve with a mean gradient of 7 mmHg. This may be a normal gradient for this valve but could suggest mild stenosis.   Trivial mitral regurgitation.   The aortic valve is thickened/calcified with a mean gradient of 16mmHg consistent with at least mild aortic stenosis. This is likely underestimated due to low cardiac output secondary to LV dysfunction.   No significant aortic valve regurgitation.   Moderate tricuspid regurgitation with RVSP of 48 mmHg.     JUDE 11/1/2019  Normal left ventricular cavity size and wall thickness. Global left ventricular function is moderate-to-severely decreased with ejection fraction estimated from 35% to 40%. Severe apical akinesis noted.      The bioprosthetic mitral valve is well seated with a mean gradient of 2mmHg and maximum pressure gradient of 5 mmHg. There is trivial mitral regurgitation.      Left atrial enlargement. Spontaneous echo contrast seen in the left atrium.   Stump of the left atrial appendage noted with no thrombus.      The aortic valve is thickened/calcified with decreased leaflet mobility consistent with aortic stenosis. There is trivial aortic insufficiency    Assessment:    1. Acute on chronic systolic heart failure due to valvular disease (HCC) on bb and aldosterone antagonist   2. PAF (paroxysmal atrial fibrillation) ([address]    3. S/P CABG x 3    4. Nonrheumatic aortic valve stenosis    5. Shortness of breath    6.  DVT (deep vein thrombosis) in pregnancy        Plan:   Patient Instructions   Discussed with Dr Humble Smith  Will admit to the hospital.  Report given to Dr Paulina Palomares in admission who accepts the patient for admission  Will need to be diuresed  Patient transferred to [address] [facility] via wheel chair with her       I appreciate the opportunity of cooperating in the care of this individual.    Jake Palomo, CNS, 2/26/2021, 12:17 PM

## 2021-02-26 NOTE — PROGRESS NOTES
The [place] Sleepiness Scale       The [place] Sleepiness Scale is widely used in the field of sleep medicine as a subjective measure of a patient's sleepiness. The test is a list of eight situations in which you rate your tendency to become sleepy on a scale of 0, no chance to 3, high chance of dozing. Your score is based on a scale of 0 to 24. The scale estimates whether you are experiencing excessive sleepiness that possibly requires medical attention. How Sleepy Are You? How sleepy are you to doze off or fall asleep in the following situations? You should rate your chances of dozing off, not just feeling tired. Even if you have not done some of these things recently try to determine how they would have affected you.  For each situation, decide whether or not you would have:     0 = No chance of dozing 1 = Slight chance of dozing   2 = Moderate chance of  dozing 3 = High change of dozing       Situation                                                                                     Chance of Dozing    Sitting and reading  0 =  []  1 =    [x] 2 =    [] 3 =    []    Watching TV  0 =  []  1 =    [] 2 =    [x] 3 =    []      Sitting inactive in public place (e.g., a theater or a meeting)  0 =  [x]  1 =    [] 2 =    [] 3 =    []    As a passenger in a car for an hour without a break          0  =  []  1 =    [x] 2 =    [] 3 =    []    Lying down to rest in the afternoon when circumstances permit    0 =  []  1 =    [x] 2 =    [] 3 =    []    Sitting and talking to someone  0 =  [x]  1 =    [] 2 =    [] 3 =    []      Sitting quietly after a lunch without alcohol  0 =  [x]  1 =    [] 2 =    [] 3 =    []    In a car, while stopped for a few minutes in traffic                                                                      0 =  [x]  1 =    [] 2 =    [] 3 =    []    Total Score = 5  If your total score is 10 or greater, you are experiencing excessive sleepiness and should consider seeking a medical follow-up. Take a copy of this screening test to your primary care physician on your next office visit. Interpretation:      0 -   7: It is unlikely that you are abnormally sleepy. 8 -   9: You have an average amount of daytime sleepiness. 10 - 15: You may be excessively sleepy depending on the situation. You may want to consider seeking medical attention. 16 - 24: You are excessively sleepy and should consider seeking medical attention.       Electronically signed by Phyllis Carrasco RCP on 2/26/2021 at 6:34 PM

## 2021-02-26 NOTE — PATIENT INSTRUCTIONS
Discussed with Dr Yana Burns  Will admit to the hospital.  Report given to Dr Shira Johns in admission who accepts the patient for admission  Will need to be diuresed  Patient transferred to [phone] via wheel chair with her

## 2021-02-26 NOTE — PROGRESS NOTES
The [place] Sleepiness Scale       The [place] Sleepiness Scale is widely used in the field of sleep medicine as a subjective measure of a patient's sleepiness. The test is a list of eight situations in which you rate your tendency to become sleepy on a scale of 0, no chance to 3, high chance of dozing. Your score is based on a scale of 0 to 24. The scale estimates whether you are experiencing excessive sleepiness that possibly requires medical attention. How Sleepy Are You? How sleepy are you to doze off or fall asleep in the following situations? You should rate your chances of dozing off, not just feeling tired. Even if you have not done some of these things recently try to determine how they would have affected you. For each situation, decide whether or not you would have:     0 = No chance of dozing 1 = Slight chance of dozing   2 = Moderate chance of  dozing 3 = High change of dozing       Situation                                                                                     Chance of Dozing    Sitting and reading  0 =  []  1 =    [] 2 =    [] 3 =    []    Watching TV  0 =  []  1 =    [] 2 =    [] 3 =    []      Sitting inactive in public place (e.g., a theater or a meeting)  0 =  []  1 =    [] 2 =    [] 3 =    []    As a passenger in a car for an hour without a break          0  =  []  1 =    [] 2 =    [] 3 =    []    Lying down to rest in the afternoon when circumstances permit    0 =  []  1 =    [] 2 =    [] 3 =    []    Sitting and talking to someone  0 =  []  1 =    [] 2 =    [] 3 =    []      Sitting quietly after a lunch without alcohol  0 =  []  1 =    [] 2 =    [] 3 =    []    In a car, while stopped for a few minutes in traffic                                                                      0 =  []  1 =    [] 2 =    [] 3 =    []    Total Score = 5  If your total score is 10 or greater, you are experiencing excessive sleepiness and should consider seeking a medical follow-up. Take a copy of this screening test to your primary care physician on your next office visit. Interpretation:      0 -   7: It is unlikely that you are abnormally sleepy. 8 -   9: You have an average amount of daytime sleepiness. 10 - 15: You may be excessively sleepy depending on the situation. You may want to consider seeking medical attention. 16 - 24: You are excessively sleepy and should consider seeking medical attention.       Electronically signed by Flaquita Goff RCP on 2/26/2021 at 5:38 PM

## 2021-02-26 NOTE — TELEPHONE ENCOUNTER
Pt calling asking to be seen today she has swelling in her stomach, legs, feet and says she has been fighting it with water pills all week and it keeps getting worse pls advise when to schedule thank you

## 2021-02-26 NOTE — H&P
HOSPITALISTS HISTORY AND PHYSICAL    2/26/2021 12:42 PM    Patient Information:  Madeline Mckeon is a 77 y.o. female [id number]  PCP:  Mikie Cullen MD (Tel: [phone] )    Chief complaint:  No chief complaint on file. Gain and worsening dyspnea on exertion    History of Present Illness:  Bony Pratt is a 77 y.o. female who presented from cardiology office. Patient was seen in the cardiology because of worsening shortness of breath and weight gain. She did gain about 12 to 13 pounds in a week's time. She does mention that she has been compliant with her medications but not so compliant with her salt intake. She denies any recent chest pain palpitations. She denies any fever cough phlegm diarrhea constipation trouble pain burning sensation while peeing one-sided weakness    Does mention was given prednisone taper by PCP about 2 weeks ago. She does has a history of coronary artery disease, history of congestive heart failure, history of prosthetic mitral valve, history of atrial fibrillation on Coumadin. History of eosinophilia. History obtained from patient,  and going over the chart  Old medical records show   History of coronary disease and CABG in 2018, paroxysmal atrial fibrillation with maze in 2018, hypertension hyperlipidemia, history of DVT PE on Coumadin,  Echo in 11/2020 impression   Summary   Dobutamine echocardiogram for aortic stenosis. Very technically limited exam due to atrial fibrillation. Baseline echocardiogram shows severe left ventricular dysfunction with   anterior, lateral and apical hypokinesis with an ejection fraction of 20-25   %. There is no improvement in wall motion with low or high dose dobutamine   suggestive of nonviable myocardium.       Mild prosthetic aortic valve stenosis with a mean gradient of 16mmHg and   peak velocity of 2.47m/s at rest. After dobutamine stress the mean AV   gradient rises to 20mmHg with 20mcg of dobutamine consistent with mild to   moderate aortic stenosis. Prosthetic mitral valve with a mean gradient of 7mmHg       REVIEW OF SYSTEMS:   All other ROS negative except mentioned in [place]      Past Medical History:   has a past medical history of Asthma, Atrial fibrillation ([address] Eosinophilia, Hemoptysis, HIGH CHOLESTEROL, Hx of blood clots, Hypertension, Irregular heart beat, Other specified gastritis without mention of hemorrhage, Palpitations, Skin cancer, and Type II or unspecified type diabetes mellitus without mention of complication, not stated as uncontrolled. Past Surgical History:   has a past surgical history that includes Cholecystectomy;  section; Colonoscopy (2017); skin biopsy; bronchoscopy (2016); Coronary artery bypass graft (2018); Mitral valve replacement (2018); transesophageal echocardiogram (2018); Tunneled venous catheter placement (Left, 2018); Cardiac catheterization (2018); Insertable Cardiac Monitor (Left, 2018); Colonoscopy (2014); Hysterectomy; Upper gastrointestinal endoscopy (N/A, 10/10/2018); Colonoscopy (10/10/2018); Colonoscopy (N/A, 2020); Pain management procedure (N/A, 2020); and Pain management procedure (Bilateral, 2021). Medications:  No current facility-administered medications on file prior to encounter. Current Outpatient Medications on File Prior to Encounter   Medication Sig Dispense Refill    torsemide (DEMADEX) 100 MG tablet TAKE 1 TABLET DAILY 90 tablet 0    predniSONE (DELTASONE) 20 MG tablet 1 TID for 4 day then 1 BID for 4 days 20 tablet 0    OXYCODONE HCL PO Take by mouth As of 2021,  states patient is taking 10mg with edge being taken off her pain after 3 hours.       ACETAMINOPHEN PO Take by mouth      blood glucose test strips (FREESTYLE LITE) strip USE TO TEST FOUR montelukast (SINGULAIR) 10 MG tablet TAKE 1 TABLET NIGHTLY 90 tablet 4    Blood Glucose Monitoring Suppl (EMBRACE PRO GLUCOSE METER) GAETANO 1 Device by Does not apply route 4 times daily 1 Device 0    HUMALOG KWIKPEN 100 UNIT/ML SOPN TAKE AS INSTRUCTED BY YOUR PRESCRIBER (Patient taking differently: Only if high blood sugar-has not been using) 15 mL 8    nystatin (MYCOSTATIN) 082185 UNIT/ML suspension TAKE ONE TEASPOONFUL BY MOUTH FOUR TIMES A DAY FOR 5 DAYS 1 Bottle 2    aspirin 81 MG chewable tablet Take 1 tablet by mouth daily 30 tablet 3    BD PEN NEEDLE JANNET U/F 32G X 4 MM MISC USE 1 PEN NEEDLE FOUR TIMES A  each 3    vitamin B-12 500 MCG tablet Take 1 tablet by mouth daily 30 tablet 3    Blood Glucose Monitoring Suppl (FREESTYLE LITE) GAETANO 1 Device by Does not apply route 4 times daily Patient to check blood sugar 4 times a day and PRN, she is treated with multiple daily injections of insulin that require correction dosing ;A1C 8.1 ; ICD code-E11.65 ,Z79.4 (Patient taking differently: 1 Device by Does not apply route 2 times daily ) 1 Device 0       Allergies: Allergies   Allergen Reactions    Ljzusqvu-Hafuwoq-Jjpohb [Fluocinolone] Shortness Of Breath    Ciprofloxacin Shortness Of Breath    Diovan [Valsartan] Shortness Of Breath    Flagyl [Metronidazole] Shortness Of Breath     Has taken diflucan at home 12/7/15    Metformin And Related [Metformin And Related] Shortness Of Breath    Benazepril      Other reaction(s): Not Recorded    Morphine      Bad reaction. \"makes her feel horrible\".  Saxagliptin      Other reaction(s): Not Recorded    Levaquin [Levofloxacin] Rash        Social History:  Patient Lives with her    reports that she has never smoked. She has never used smokeless tobacco. She reports that she does not drink alcohol or use drugs.      Family History:  family history includes Asthma in her mother; Cancer in her father; Heart Disease in her mother; High Blood problems. *        Assessment/Plan:   Acute on chronic systolic heart failure NYHA III  IV Lasix Aldactone metolazone, get labs, get cardiology consultation, will need compliance with diet    Diabetes mellitus  Insulin sliding scale Lantus carb consistent diet    Atrial fibrillation history of DVT  On Coumadin, check INR, pharmacy to dose Coumadin  Continue on beta-blockers      DVT prophylaxis Coumadin  Code status full code confirmed with the patient  Diet carb consistent low salt  IV access peripheral  Henry Catheter no    Admit as inpatient. I anticipate hospitalization spanning more than two midnights for investigation and treatment of the above medically necessary diagnoses. Please note that some part of this chart was generated using Dragon dictation software. Although every effort was made to ensure the accuracy of this automated transcription, some errors in transcription may have occurred inadvertently. If you may need any clarification, please do not hesitate to contact me through [facility].        Samson Gustafson MD    2/26/2021 12:42 PM

## 2021-02-26 NOTE — PROGRESS NOTES
Pt arrived to 3a at 1315, A&Ox4, Pulse 109   Wt 211 lb (95.7 kg)   SpO2 95%   BMI 32.08 kg/m²   SR/ST on tele, sob with exertion to bathroom, oriented to room and call light, peripheral iv placed.

## 2021-02-26 NOTE — TELEPHONE ENCOUNTER
Called and spoke to patient - swelling that is causing weight gain - did not weight but can tell in her close and cant get a shoe on -- its pitting edema - imprint stays for a while before returning to normal- no SOB while sitting but does get some while walking.  No seeping and no warmth to touch

## 2021-02-26 NOTE — PROGRESS NOTES
The [place] Sleepiness Scale       The [place] Sleepiness Scale is widely used in the field of sleep medicine as a subjective measure of a patient's sleepiness. The test is a list of eight situations in which you rate your tendency to become sleepy on a scale of 0, no chance to 3, high chance of dozing. Your score is based on a scale of 0 to 24. The scale estimates whether you are experiencing excessive sleepiness that possibly requires medical attention. How Sleepy Are You? How sleepy are you to doze off or fall asleep in the following situations? You should rate your chances of dozing off, not just feeling tired. Even if you have not done some of these things recently try to determine how they would have affected you.  For each situation, decide whether or not you would have:     0 = No chance of dozing 1 = Slight chance of dozing   2 = Moderate chance of  dozing 3 = High change of dozing       Situation                                                                                     Chance of Dozing    Sitting and reading  0 =  []  1 =    [x] 2 =    [] 3 =    []    Watching TV  0 =  []  1 =    [] 2 =    [x] 3 =    []      Sitting inactive in public place (e.g., a theater or a meeting)  0 =  [x]  1 =    [] 2 =    [] 3 =    []    As a passenger in a car for an hour without a break          0  =  []  1 =    [x] 2 =    [] 3 =    []    Lying down to rest in the afternoon when circumstances permit    0 =  []  1 =    [x] 2 =    [] 3 =    []    Sitting and talking to someone  0 =  [x]  1 =    [] 2 =    [] 3 =    []      Sitting quietly after a lunch without alcohol  0 =  [x]  1 =    [] 2 =    [] 3 =    []    In a car, while stopped for a few minutes in traffic                                                                      0 =  [x]  1 =    [] 2 =    [] 3 =    []    Total Score = 5  If your total score is 10 or greater, you are experiencing excessive sleepiness and should consider seeking a medical follow-up. Take a copy of this screening test to your primary care physician on your next office visit. Interpretation:      0 -   7: It is unlikely that you are abnormally sleepy. 8 -   9: You have an average amount of daytime sleepiness. 10 - 15: You may be excessively sleepy depending on the situation. You may want to consider seeking medical attention. 16 - 24: You are excessively sleepy and should consider seeking medical attention.       Electronically signed by Claire Garcia RCP on 2/26/2021 at 5:51 PM

## 2021-02-26 NOTE — CARE COORDINATION
Discharge Planning Assessment  RN discharge planner met with patient and family member- spouse  to discuss reason for admission, current living situation, and potential needs at the time of discharge    Demographics/Insurance verified Yes    Current type of dwellin story home with  2 steps to get in    Patient from ECF/SW confirmed with:  N/A    Living arrangements:  Lives with spouse    Level of function/Support: Independent    PCP:  Dr Krunal Vazquez    Last Visit to PCP:  2 weeks ago    DME:  None    Active with any community resources/agencies/skilled home care:  Had a Nurse for dressings changes s/p surgery in 2018. Does not  recall the agency she was from . Medication compliance issues:  Denies    Financial issues that could impact healthcare:  No      Tentative discharge plan: home    Discussed and provided facilities of choice if transition to a skilled nursing facility is required at the time of discharge- No      Discussed with patient and/or family that on the day of discharge home tentative time of discharge will be between 10 AM and noon.     Transportation at the time of discharge:  Spouse

## 2021-02-26 NOTE — DISCHARGE SUMMARY
Discharge Summary    Date: 2/26/2021  Patient Name: Bita Mccabe YOB: 1945 Age: 77 y.o. Admit Date: 2/26/2021  Discharge Date:  Discharge Condition:    Admission Diagnosis  CHF (congestive heart failure), NYHA class I, acute on chronic, combined ([facility] [address] (I50.43)     Discharge Diagnosis  Active Problems: CHF (congestive heart failure), NYHA class I, acute on chronic, combined ([facility])Resolved Problems: * No resolved hospital problems. [address] Stay  Narrative of Hospital Course:      Consultants:  IP CONSULT TO CARDIOLOGYIP CONSULT TO PHARMACY    Surgeries/procedures Performed:       Treatments:           Discharge Plan/Disposition:  Home    Hospital/Incidental Findings Requiring Follow Up:    Patient Instructions:    Diet:    Activity:  For number of days (if applicable): Other Instructions:    Provider Follow-Up:   No follow-ups on file.      Significant Diagnostic Studies:    Recent Labs:  Admission on 02/26/2021WBC                                   Date: 02/26/2021Value: 8.0         Ref range: 4.0 - 11.0 K/uL    Status: FinalRBC                                           Date: 02/26/2021Value: 4.40        Ref range: 4.00 - 5.20 M/uL   Status: FinalHemoglobin                                    Date: 02/26/2021Value: 12.1        Ref range: 12.0 - 16.0 g/dL   Status: FinalHematocrit                                    Date: 02/26/2021Value: 38.2        Ref range: 36.0 - 48.0 %      Status: FinalMCV                                           Date: 02/26/2021Value: 86.8        Ref range: 80.0 - 100.0 fL    Status: [address]                                           Date: 02/26/2021Value: 27.6        Ref range: 26.0 - 34.0 pg     Status: [address]                                          Date: 02/26/2021Value: 31.8        Ref range: 31.0 - 36.0 g/dL   Status: FinalRDW                                           Date: 02/26/2021Value: 17.1*       Ref range: 12.4 - 15.4 %      Status: FinalPlatelets Date: 02/26/2021Value: 205         Ref range: 135 - 450 K/uL     Status: FinalMPV                                           Date: 02/26/2021Value: 8.5         Ref range: 5.0 - 10.5 fL      Status: FinalSodium                                        Date: 02/26/2021Value: 136         Ref range: 136 - 145 mmol/L   Status: FinalPotassium reflex Magnesium                    Date: 02/26/2021Value: 3.5         Ref range: 3.5 - 5.1 mmol/L   Status: FinalChloride                                      Date: 02/26/2021Value: 97*         Ref range: 99 - 110 mmol/L    Status: FinalCO2                                           Date: 02/26/2021Value: 29          Ref range: 21 - 32 mmol/L     Status: FinalAnion Gap                                     Date: 02/26/2021Value: 10          Ref range: 3 - 16             Status: FinalGlucose                                       Date: 02/26/2021Value: 153*        Ref range: 70 - 99 mg/dL      Status: FinalBUN                                           Date: 02/26/2021Value: 41*         Ref range: 7 - 20 mg/dL       Status: FinalCREATININE                                    Date: 02/26/2021Value: 1.5*        Ref range: 0.6 - 1.2 mg/dL    Status: FinalGFR Non-                      Date: 02/26/2021Value: 34*         Ref range: >60                Status: Final              Comment: >60 mL/min/1.73m2 EGFR, calc. for ages 25 and older using theMDRD formula (not corrected for weight), is valid for stablerenal function. GFR                           Date: 02/26/2021Value: 41*         Ref range: >60                Status: Final              Comment: Chronic Kidney Disease: less than 60 ml/min/1.73 sq.m. Kidney Failure: less than 15 ml/min/1.73 sq. m. Results valid for patients 18 years and older. Calcium                                       Date: 02/26/2021Value: 8.6         Ref range: 8.3 - 10.6 mg/dL   Status: FinalTotal Protein Status: FinalQTc Calculation (Bazett)                      Date: 02/26/2021Value: 433         Ref range: ms                 Status: FinalR Axis                                        Date: 02/26/2021Value: 27          Ref range: degrees            Status: FinalT Axis                                        Date: 02/26/2021Value: 135         Ref range: degrees            Status: FinalDiagnosis                                     Date: 02/26/2021Value: Atrial fibrillation with rapid ventricular respon*                     Status: FinalPro-BNP                                       Date: 02/26/2021Value: 5,497*      Ref range: 0 - 449 pg/mL      Status: Final              Comment: Methodology by NT-proBNPAn age-independent cutoff point of 300 pg/ml has a 98%negative predictive value excluding acute heart failure. Values exceeding the age-related cutoff values (450 pg/mL ifage<50, 900 if 50-75 and 1800 if >75) has 90% sensitivity and84% specificity for diagnosing acute HF. In patients withrenal compromise (eGFR<60) values greater than 1200pg/ml havea diagnostic sensitivity and specificity of 89% and 72% foracute HF. POC Glucose                                   Date: 02/26/2021Value: 143*        Ref range: 70 - 99 mg/dl      Status: FinalPerformed on                                  Date: 02/26/2021Value: ACCU-CHEK     Status: FinalMagnesium                                     Date: 02/26/2021Value: 2.10        Ref range: 1.80 - 2.40 mg/dL  Status: FinalPOC Glucose                                   Date: 02/26/2021Value: 191*        Ref range: 70 - 99 mg/dl      Status: FinalPerformed on                                  Date: 02/26/2021Value: ACCU-CHEK     Status: Final------------    Radiology last 7 days:  Xr Chest PortableResult Date: 2/26/2021Stable chest.  No acute cardiopulmonary abnormality. Stable mild cardiomegaly.       [unfilled]    Discharge Medications    Current Discharge Medication List    Current Discharge Medication List    Current Discharge Medication ListCONTINUE these medications which have NOT CHANGEDpredniSONE (DELTASONE) 10 MG tabletTake 10 mg by mouth dailytorsemide (DEMADEX) 100 MG tabletTAKE 1 TABLET DAILYQty: 90 tablet Refills: 0OXYCODONE HCL POTake 10 mg by mouth As of 1/21/2021,  states patient is taking 10mg with edge being taken off her pain after 3 hours. ACETAMINOPHEN POTake 500 mg by mouth every 6 hours as needed methocarbamol (ROBAXIN) 750 MG tabletTake 750 mg by mouth as needed (back pain)spironolactone (ALDACTONE) 50 MG tabletTAKE 1 TABLET DAILYQty: 90 tablet Refills: 3metOLazone (ZAROXOLYN) 2.5 MG tabletTake 1 tablet by mouth dailyQty: 30 tablet Refills: 0gabapentin (NEURONTIN) 300 MG capsuleTake 1 capsule by mouth nightly for 90 days. Intended supply: 30 daysQty: 90 capsule Refills: 0Associated Diagnoses:Cervical radiculopathy;  Foraminal stenosis of cervical region; Cervical spondylosis without myelopathy; DDD (degenerative disc disease), cervicalcarvedilol (COREG) 25 MG tabletTake 1 tablet by mouth 2 times dailyQty: 270 tablet Refills: 3potassium chloride (KLOR-CON M) 10 MEQ extended release tabletTAKE 1 TABLET DAILYQty: 90 tablet Refills: 1omeprazole (PRILOSEC) 20 MG delayed release capsuleTake 20 mg by mouth dailyondansetron (ZOFRAN) 4 MG tabletTake 1 tablet by mouth 3 times daily as needed for Nausea or VomitingQty: 30 tablet Refills: 0warfarin (COUMADIN) 5 MG tabletTAKE 1 TABLET DAILYQty: 100 tablet Refills: 4montelukast (SINGULAIR) 10 MG tabletTAKE 1 TABLET NIGHTLYQty: 90 tablet Refills: 4HUMALOG KWIKPEN 100 UNIT/ML SOPNTAKE AS INSTRUCTED BY YOUR PRESCRIBERQty: 15 mL Refills: 8aspirin 81 MG chewable tabletTake 1 tablet by mouth dailyQty: 30 tablet Refills: 3vitamin B-12 500 MCG tabletTake 1 tablet by mouth dailyQty: 30 tablet Refills: 3blood glucose test strips (FREESTYLE LITE) stripUSE TO TEST FOUR TIMES A DAYQty: 200 strip Refills: 4Associated Diagnoses:Diabetes mellitus type 2 in obese (HCC)albuterol sulfate  (90 Base) MCG/ACT inhalerUSE 2 INHALATIONS EVERY 6 HOURS AS NEEDED FOR WHEEZINGQty: 25.5 g Refills: 2insulin glargine (LANTUS SOLOSTAR) 100 UNIT/ML injection penINJECT 50 UNITS IN THE MORNING AND 24 UNITS AT BEDTIMEQty: 90 mL Refills: 1albuterol (PROVENTIL) (2.5 MG/3ML) 0.083% nebulizer solutionTake 3 mLs by nebulization every 6 hours as needed for [employer]: 120 each Refills: 3exenatide (BYETTA 10 MCG PEN) 10 MCG/0.04ML injectionInject 0.04 mLs into the skin dailyQty: 3 pen Refills: 3polyethylene glycol (GLYCOLAX) 17 GM/SCOOP powderTake 17 g by mouth every other day FreeStyle Lancets MISC1 each by Does not apply route 4 times dailyQty: 200 each Refills: 5Comments: Patient to check blood sugar 4 times a day and PRN, she is treated with multiple daily injections of insulin that require correction dosing ;A1C 8.1 ; ICD code-E11.65 ,Z79.4Associated Diagnoses:Diabetes mellitus type 2 in obese ([facility])Blood Glucose Monitoring Suppl (EMBRACE PRO GLUCOSE METER) DEVI1 Device by Does not apply route 4 times dailyQty: 1 Device Refills: 0Associated Diagnoses:Diabetes mellitus type 2 in obese ([facility])nystatin (MYCOSTATIN) 885359 UNIT/ML suspensionTAKE ONE TEASPOONFUL BY MOUTH FOUR TIMES A DAY FOR 5 DAYSQty: 1 Bottle Refills: 2BD PEN NEEDLE JANNET U/F 32G X 4 MM MISCUSE 1 PEN NEEDLE FOUR TIMES A DAYQty: 360 each Refills: 3Associated Diagnoses:Diabetes mellitus type 2 in obese [facility])    Current Discharge Medication List    Time Spent on Discharge:E] minutes were spent in patient examination, evaluation, counseling as well as medication reconciliation, prescriptions for required medications, discharge plan, and follow up. Electronically signed by Liane Dinh RCP on 2/26/21 at 6:36 PM EST   The [place] Sleepiness Scale       The [place] Sleepiness Scale is widely used in the field of sleep medicine as a subjective measure of a patient's sleepiness.  The test is a list of eight situations in which you rate your tendency to become sleepy on a scale of 0, no chance to 3, high chance of dozing. Your score is based on a scale of 0 to 24. The scale estimates whether you are experiencing excessive sleepiness that possibly requires medical attention. How Sleepy Are You? How sleepy are you to doze off or fall asleep in the following situations? You should rate your chances of dozing off, not just feeling tired. Even if you have not done some of these things recently try to determine how they would have affected you. For each situation, decide whether or not you would have:     0 = No chance of dozing 1 = Slight chance of dozing   2 = Moderate chance of  dozing 3 = High change of dozing       Situation                                                                                     Chance of Dozing    Sitting and reading  0 =  []  1 =    [x] 2 =    [] 3 =    []    Watching TV  0 =  []  1 =    [] 2 =    [x] 3 =    []      Sitting inactive in public place (e.g., a theater or a meeting)  0 =  [x]  1 =    [] 2 =    [] 3 =    []    As a passenger in a car for an hour without a break          0  =  []  1 =    [x] 2 =    [] 3 =    []    Lying down to rest in the afternoon when circumstances permit    0 =  []  1 =    [x] 2 =    [] 3 =    []    Sitting and talking to someone  0 =  [x]  1 =    [] 2 =    [] 3 =    []      Sitting quietly after a lunch without alcohol  0 =  [x]  1 =    [] 2 =    [] 3 =    []    In a car, while stopped for a few minutes in traffic                                                                      0 =  [x]  1 =    [] 2 =    [] 3 =    []    Total Score = 5  If your total score is 10 or greater, you are experiencing excessive sleepiness and should consider seeking a medical follow-up. Take a copy of this screening test to your primary care physician on your next office visit. Interpretation:      0 -   7: It is unlikely that you are abnormally sleepy.     8 -   9: You have an average amount of daytime sleepiness. 10 - 15: You may be excessively sleepy depending on the situation. You may want to consider seeking medical attention. 16 - 24: You are excessively sleepy and should consider seeking medical attention.       Electronically signed by Jocelyne Rosales RCP on 2/26/2021 at 6:34 PM

## 2021-02-27 LAB
ANION GAP SERPL CALCULATED.3IONS-SCNC: 12 MMOL/L (ref 3–16)
BUN BLDV-MCNC: 44 MG/DL (ref 7–20)
CALCIUM SERPL-MCNC: 8.7 MG/DL (ref 8.3–10.6)
CHLORIDE BLD-SCNC: 96 MMOL/L (ref 99–110)
CO2: 29 MMOL/L (ref 21–32)
CREAT SERPL-MCNC: 1.6 MG/DL (ref 0.6–1.2)
GFR AFRICAN AMERICAN: 38
GFR NON-AFRICAN AMERICAN: 31
GLUCOSE BLD-MCNC: 121 MG/DL (ref 70–99)
GLUCOSE BLD-MCNC: 168 MG/DL (ref 70–99)
GLUCOSE BLD-MCNC: 79 MG/DL (ref 70–99)
GLUCOSE BLD-MCNC: 91 MG/DL (ref 70–99)
GLUCOSE BLD-MCNC: 98 MG/DL (ref 70–99)
HCT VFR BLD CALC: 37.4 % (ref 36–48)
HEMOGLOBIN: 11.9 G/DL (ref 12–16)
INR BLD: 3.43 (ref 0.86–1.14)
MCH RBC QN AUTO: 27.7 PG (ref 26–34)
MCHC RBC AUTO-ENTMCNC: 31.9 G/DL (ref 31–36)
MCV RBC AUTO: 86.9 FL (ref 80–100)
PDW BLD-RTO: 16.6 % (ref 12.4–15.4)
PERFORMED ON: ABNORMAL
PERFORMED ON: ABNORMAL
PERFORMED ON: NORMAL
PERFORMED ON: NORMAL
PLATELET # BLD: 207 K/UL (ref 135–450)
PMV BLD AUTO: 8.4 FL (ref 5–10.5)
POTASSIUM SERPL-SCNC: 3 MMOL/L (ref 3.5–5.1)
POTASSIUM SERPL-SCNC: 3.3 MMOL/L (ref 3.5–5.1)
PROTHROMBIN TIME: 40.3 SEC (ref 10–13.2)
RBC # BLD: 4.3 M/UL (ref 4–5.2)
SODIUM BLD-SCNC: 137 MMOL/L (ref 136–145)
WBC # BLD: 6.2 K/UL (ref 4–11)

## 2021-02-27 PROCEDURE — 99233 SBSQ HOSP IP/OBS HIGH 50: CPT | Performed by: NURSE PRACTITIONER

## 2021-02-27 PROCEDURE — 36415 COLL VENOUS BLD VENIPUNCTURE: CPT

## 2021-02-27 PROCEDURE — 1200000000 HC SEMI PRIVATE

## 2021-02-27 PROCEDURE — 6370000000 HC RX 637 (ALT 250 FOR IP): Performed by: HOSPITALIST

## 2021-02-27 PROCEDURE — 85610 PROTHROMBIN TIME: CPT

## 2021-02-27 PROCEDURE — 80048 BASIC METABOLIC PNL TOTAL CA: CPT

## 2021-02-27 PROCEDURE — 6360000002 HC RX W HCPCS: Performed by: INTERNAL MEDICINE

## 2021-02-27 PROCEDURE — 84132 ASSAY OF SERUM POTASSIUM: CPT

## 2021-02-27 PROCEDURE — 85027 COMPLETE CBC AUTOMATED: CPT

## 2021-02-27 PROCEDURE — 2580000003 HC RX 258: Performed by: INTERNAL MEDICINE

## 2021-02-27 PROCEDURE — 6370000000 HC RX 637 (ALT 250 FOR IP): Performed by: INTERNAL MEDICINE

## 2021-02-27 RX ORDER — POTASSIUM CHLORIDE 20 MEQ/1
40 TABLET, EXTENDED RELEASE ORAL PRN
Status: DISCONTINUED | OUTPATIENT
Start: 2021-02-27 | End: 2021-03-01

## 2021-02-27 RX ORDER — POTASSIUM CHLORIDE 20 MEQ/1
40 TABLET, EXTENDED RELEASE ORAL 2 TIMES DAILY WITH MEALS
Status: DISPENSED | OUTPATIENT
Start: 2021-02-27 | End: 2021-02-28

## 2021-02-27 RX ORDER — POTASSIUM CHLORIDE 7.45 MG/ML
10 INJECTION INTRAVENOUS PRN
Status: DISCONTINUED | OUTPATIENT
Start: 2021-02-27 | End: 2021-03-01

## 2021-02-27 RX ADMIN — Medication 10 ML: at 20:45

## 2021-02-27 RX ADMIN — FUROSEMIDE 40 MG: 10 INJECTION, SOLUTION INTRAVENOUS at 16:55

## 2021-02-27 RX ADMIN — POLYETHYLENE GLYCOL 3350 17 G: 17 POWDER, FOR SOLUTION ORAL at 09:50

## 2021-02-27 RX ADMIN — GABAPENTIN 300 MG: 300 CAPSULE ORAL at 20:45

## 2021-02-27 RX ADMIN — FUROSEMIDE 40 MG: 10 INJECTION, SOLUTION INTRAVENOUS at 09:09

## 2021-02-27 RX ADMIN — OXYCODONE 10 MG: 5 TABLET ORAL at 05:27

## 2021-02-27 RX ADMIN — SPIRONOLACTONE 50 MG: 25 TABLET ORAL at 09:10

## 2021-02-27 RX ADMIN — INSULIN GLARGINE 30 UNITS: 100 INJECTION, SOLUTION SUBCUTANEOUS at 09:50

## 2021-02-27 RX ADMIN — POTASSIUM CHLORIDE 40 MEQ: 1500 TABLET, EXTENDED RELEASE ORAL at 16:56

## 2021-02-27 RX ADMIN — INSULIN LISPRO 2 UNITS: 100 INJECTION, SOLUTION INTRAVENOUS; SUBCUTANEOUS at 12:13

## 2021-02-27 RX ADMIN — POTASSIUM BICARBONATE 40 MEQ: 782 TABLET, EFFERVESCENT ORAL at 09:09

## 2021-02-27 RX ADMIN — METOLAZONE 2.5 MG: 2.5 TABLET ORAL at 09:10

## 2021-02-27 RX ADMIN — Medication 10 ML: at 09:10

## 2021-02-27 RX ADMIN — ACETAMINOPHEN 650 MG: 325 TABLET ORAL at 20:51

## 2021-02-27 RX ADMIN — MONTELUKAST SODIUM 10 MG: 10 TABLET, FILM COATED ORAL at 20:45

## 2021-02-27 RX ADMIN — PANTOPRAZOLE SODIUM 40 MG: 40 TABLET, DELAYED RELEASE ORAL at 05:27

## 2021-02-27 RX ADMIN — OXYCODONE 10 MG: 5 TABLET ORAL at 12:35

## 2021-02-27 ASSESSMENT — PAIN SCALES - GENERAL
PAINLEVEL_OUTOF10: 7
PAINLEVEL_OUTOF10: 7
PAINLEVEL_OUTOF10: 4
PAINLEVEL_OUTOF10: 8

## 2021-02-27 ASSESSMENT — PAIN DESCRIPTION - DESCRIPTORS
DESCRIPTORS: CONSTANT;THROBBING

## 2021-02-27 ASSESSMENT — PAIN DESCRIPTION - PAIN TYPE
TYPE: CHRONIC PAIN

## 2021-02-27 ASSESSMENT — PAIN DESCRIPTION - LOCATION
LOCATION: HAND
LOCATION: BACK

## 2021-02-27 NOTE — PROGRESS NOTES
Updated pt and  on plan of care. Answered all questions. Pt and family satisfied. Instructed to call if further questions arise.

## 2021-02-27 NOTE — PROGRESS NOTES
Pharmacy to Dose Warfarin    Pharmacy consulted to dose warfarin for Afib. INR Goal: 2-3    INR today: 3.43    Assessment/Plan:  - INR is supratherapeutic  - Will hold dose tonight  - Daily INR ordered    Pharmacy will continue to follow.     Thanks,  Naty Grullon, PharmD  PGY-1 Pharmacy Resident  [id number]

## 2021-02-27 NOTE — PROGRESS NOTES
Nutrition Education    Provided heart failure diet education. Education included low sodium diet guidelines (3-4 gm Na+/day) and fluid restriction (64 oz/day). Reviewed foods to choose and foods to avoid, along with label reading and ways to add flavor to food. Pt currently tries to follow a low sodium diet at home. Pt states understanding of education. Time spent with patient:10 minutes.        Electronically signed by Zach Ruiz RD, ADELE on 2/27/21 at 9:03 AM EST    Contact: [phone]

## 2021-02-27 NOTE — PROGRESS NOTES
CHF (congestive heart failure), NYHA class I, acute on chronic, combined ([facility])    Stage 3 chronic kidney disease    Coronary artery disease involving native heart without angina pectoris  Resolved Problems:    * No resolved hospital problems. *       Assessment & Plan:   1. Acute on chronic systolic CHF  -Continue IV Lasix for now. -Replace electrolytes close monitoring of renal function  Creatinine is 1.6 around baseline. For now  -Weight down 2 pounds. Chronic A.  Fib  Continue warfarin  Pharmacy dosing    Chronic kidney disease  Creatinine stable right now monitor closely BMP replace electrolytes    Hypokalemia replaced      Diet: DIET LOW SODIUM 2 GM;  Code:Full Code  DVT PPX lovenox       Toño Turcios MD   2/27/2021 10:58 AM

## 2021-02-27 NOTE — PROGRESS NOTES
hours as needed  Yes Historical Provider, MD   methocarbamol (ROBAXIN) 750 MG tablet Take 750 mg by mouth as needed (back pain) Yes Historical Provider, MD   spironolactone (ALDACTONE) 50 MG tablet TAKE 1 TABLET DAILY Yes Vivek Gardiner MD   metOLazone (ZAROXOLYN) 2.5 MG tablet Take 1 tablet by mouth daily  Patient taking differently: Take 2.5 mg by mouth daily as needed  Yes Rufino Tripp MD   gabapentin (NEURONTIN) 300 MG capsule Take 1 capsule by mouth nightly for 90 days. Intended supply: 30 days Yes MUNDO Posadas   carvedilol (COREG) 25 MG tablet Take 1 tablet by mouth 2 times daily Yes Blanche Munson MD   potassium chloride (KLOR-CON M) 10 MEQ extended release tablet TAKE 1 TABLET DAILY Yes Rufino Tripp MD   omeprazole (PRILOSEC) 20 MG delayed release capsule Take 20 mg by mouth daily Yes Historical Provider, MD   ondansetron (ZOFRAN) 4 MG tablet Take 1 tablet by mouth 3 times daily as needed for Nausea or Vomiting Yes Rufino Tripp MD   warfarin (COUMADIN) 5 MG tablet TAKE 1 TABLET DAILY  Patient taking differently: Take 5 mg by mouth daily Managed by PCP Yes Yaquelin Wahl MD   montelukast (SINGULAIR) 10 MG tablet TAKE 1 TABLET NIGHTLY Yes Yaquelin Wahl MD   HUMALOG KWIKPEN 100 UNIT/ML SOPN TAKE AS INSTRUCTED BY YOUR PRESCRIBER  Patient taking differently:  Only if high blood sugar-has not been using Yes Rufino Tripp MD   aspirin 81 MG chewable tablet Take 1 tablet by mouth daily Yes Rufino Tripp MD   vitamin B-12 500 MCG tablet Take 1 tablet by mouth daily Yes Jaime Berumen MD   blood glucose test strips (FREESTYLE LITE) strip USE TO TEST FOUR TIMES A DAY  Rufino Tripp MD   albuterol sulfate  (90 Base) MCG/ACT inhaler USE 2 INHALATIONS EVERY 6 HOURS AS NEEDED FOR WHEEZING  Rufino Tripp MD   insulin glargine (LANTUS SOLOSTAR) 100 UNIT/ML injection pen INJECT 50 UNITS IN THE MORNING AND 24 UNITS AT BEDTIME  Patient taking differently: every morning 30 units in the morning and PRN at night if needed  Jarrell Case MD   albuterol (PROVENTIL) (2.5 MG/3ML) 0.083% nebulizer solution Take 3 mLs by nebulization every 6 hours as needed for Wheezing  Jarrell Case MD   exenatide (BYETTA 10 MCG PEN) 10 MCG/0.04ML injection Inject 0.04 mLs into the skin daily  Patient taking differently: Inject 10 mcg into the skin 2 times daily (with meals)   Jarrell Case MD   polyethylene glycol (GLYCOLAX) 17 GM/SCOOP powder Take 17 g by mouth every other day   Historical Provider, MD Davisyle Lancets MISC 1 each by Does not apply route 4 times daily  Jarrell Case MD   Blood Glucose Monitoring Suppl (CURA HealthcareACE PRO GLUCOSE METER) GAETANO 1 Device by Does not apply route 4 times daily  Jarrell Case MD   nystatin (MYCOSTATIN) 284518 UNIT/ML suspension TAKE ONE TEASPOONFUL BY MOUTH FOUR TIMES A DAY FOR 5 DAYS  Jarrell Case MD   BD PEN NEEDLE JANNET U/F 32G X 4 MM MISC USE 1 PEN NEEDLE FOUR TIMES A DAY  Jarrell Case MD      Scheduled Meds:   exenatide  10 mcg Subcutaneous BID WC    potassium chloride  40 mEq Oral BID WC    sodium chloride flush  10 mL Intravenous 2 times per day    insulin lispro  0-12 Units Subcutaneous TID WC    insulin lispro  0-6 Units Subcutaneous Nightly    gabapentin  300 mg Oral Nightly    insulin glargine  30 Units Subcutaneous QAM    montelukast  10 mg Oral Nightly    metOLazone  2.5 mg Oral Daily    pantoprazole  40 mg Oral QAM AC    spironolactone  50 mg Oral Daily    furosemide  40 mg Intravenous BID    warfarin (COUMADIN) daily dosing (placeholder)   Other RX Placeholder     Continuous Infusions:   dextrose       PRN Meds:potassium chloride **OR** potassium alternative oral replacement **OR** potassium chloride, sodium chloride flush, promethazine **OR** ondansetron, polyethylene glycol, acetaminophen **OR** acetaminophen, glucose, dextrose, glucagon (rDNA), dextrose, albuterol sulfate HFA, oxyCODONE distention  · Genito-Urinary: Negative for dysuria, or hematuria  · Musculoskeletal: No focal weakness  · Neurological/Psych: Negative for confusion, seizures, headaches, balance issues or TIA-like symptoms. No anxiety, depression, or insomnia    Physical Examination:  Vitals:    02/27/21 0800   BP: 109/69   Pulse: 109   Resp: 16   Temp: 97.6 °F (36.4 °C)   SpO2: 94%      In: 480 [P.O.:480]  Out: 2050    Wt Readings from Last 3 Encounters:   02/27/21 209 lb 4.8 oz (94.9 kg)   02/26/21 213 lb (96.6 kg)   01/18/21 200 lb (90.7 kg)       Intake/Output Summary (Last 24 hours) at 2/27/2021 6022  Last data filed at 2/27/2021 0720  Gross per 24 hour   Intake 480 ml   Output 2050 ml   Net -1570 ml     Telemetry: Personally Reviewed Atrial fibrillation   · Constitutional: Cooperative and in no apparent distress, and appears well nourished  · Skin: Warm and pink; no pallor, cyanosis, bruising, or clubbing  · HEENT: Symmetric and normocephalic. PERRL, EOM intact. Conjunctiva pink with clear sclera. Mucus membranes pink and moist.   · Cardiovascular: irregular and rhythm. S1 & S2, no murmurs, rubs, or gallops. Peripheral pulses 2+, capillary refill < 3 seconds. positiveelevation of JVP. 2+ Peripheral edema abd distension  · Respiratory: Respirations symmetric and unlabored. Lungs no wheezing or rhonchi + BLL crackles  · Gastrointestinal: Abdomen distended and round. Bowel sounds normoactive in all quadrants. · Musculoskeletal: No focal weakness  · Neurologic/Psych: Awake and orientated to person, place and time. Calm affect, appropriate mood    Pertinent labs, diagnostic, device, and imaging results reviewed as a part of this visit    Labs:    BMP:   Recent Labs     02/26/21  1511 02/27/21  0546    137   K 3.5 3.0*   CL 97* 96*   CO2 29 29   BUN 41* 44*   CREATININE 1.5* 1.6*   MG 2.10  --      CrCl cannot be calculated (Unknown ideal weight.).    CBC:   Recent Labs     02/26/21  1511 02/27/21  0546   WBC 8.0 6.2   HGB 12.1 11.9*   HCT 38.2 37.4   MCV 86.8 86.9    207     Thyroid:   Lab Results   Component Value Date    TSH 2.01 10/21/2020     Lipids:   Lab Results   Component Value Date    CHOL 149 2020    HDL 40 2020    TRIG 60 2020     LFTS:   Lab Results   Component Value Date    ALT 13 2021    AST 15 2021    ALKPHOS 143 2021    PROT 5.9 2021    AGRATIO 1.1 2021    BILITOT 0.4 2021     Cardiac Enzymes:   Lab Results   Component Value Date    TROPONINI <0.01 05/10/2020    TROPONINI <0.01 2020    TROPONINI <0.01 2019     Coags:   Lab Results   Component Value Date    PROTIME 40.3 2021    INR 3.43 2021     EC2021: Atrial fibrillation with rapid ventricular response  T wave abnormality, consider lateral ischemia    ECHO:  2020    Stress Test:    Summary   Dobutamine echocardiogram for aortic stenosis. Very technically limited exam due to atrial fibrillation. Baseline echocardiogram shows severe left ventricular dysfunction with   anterior, lateral and apical hypokinesis with an ejection fraction of 20-25   %. There is no improvement in wall motion with low or high dose dobutamine suggestive of nonviable myocardium. Mild prosthetic aortic valve stenosis with a mean gradient of 16mmHg and peak velocity of 2.47m/s at rest. After dobutamine stress the mean AV gradient rises to 20mmHg with 20mcg of dobutamine consistent with mild to moderate aortic stenosis.    Prosthetic mitral valve with a mean gradient of 7mmHg   Cath:    Problem List:   Patient Active Problem List    Diagnosis Date Noted    CHF (congestive heart failure), NYHA class I, acute on chronic, combined ([facility] [address] 2021    Stage 3 chronic kidney disease     Coronary artery disease involving native heart without angina pectoris     DVT (deep vein thrombosis) in pregnancy 12/15/2020    Nonrheumatic aortic valve stenosis 2020    Pneumatosis intestinalis 05/10/2020    Ischemic cardiomyopathy 01/20/2020    Occlusion and stenosis of bilateral carotid arteries 01/20/2020    Persistent atrial fibrillation ([facility] [address] 10/30/2019    S/P MVR (mitral valve repair)     Thoracic degenerative disc disease 06/07/2019    Chronic combined systolic (congestive) and diastolic (congestive) heart failure ([facility] [address] 01/15/2019    Obesity, Class I, BMI 30-34.9     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    Goiter 09/07/2018    S/P CABG x 3 08/22/2018    Coronary artery disease due to lipid rich plaque     Nonrheumatic mitral valve regurgitation     Encounter for loop recorder check 08/16/2018    Cardiac arrhythmia     [facility]     Hypertension     Asthma 01/12/2018    Type 2 diabetes mellitus with hyperglycemia, with long-term current use of insulin ([facility] [address] 04/27/2017    Primary osteoarthritis of both knees 03/21/2017    Multiple pulmonary nodules 08/01/2016    History of pulmonary embolism     B12 deficiency 09/08/2014    Paroxysmal SVT (supraventricular tachycardia) ([facility] 30/58/7154    Eosinophilic gastritis 01/44/5392    Generalized osteoarthrosis, involving multiple sites 03/25/2013    Long term current use of anticoagulant 12/19/2012    Hypercholesteremia 12/19/2012      Assessment and Plan:   Acute on Chronic systolic and diastolic heart failure (NYHA Class III)  - Appears decompensated               ~ EF 25-30%  per echo   - Continue IV lasix 40 mg bid   - Continue aldactone and metolazone   - Weight down 2 lbs, about 10 more to go    - Strict I&O, daily wts, and sodium/fluid restriction  Chronic Atrial fibrillation   - CVR   - S/p Maze   - Continue warfarin (INR 3.43), pharmacy to dose  CAD   - S/p CABG   - No reports of angina   - Continue medical therapy  AS   - Continue diuresis  CKD/TORIBIO   - Appears stable today   - BMP daily   Hypokalemia   - Monitor and replace per s/s order    - Continue diuresis  - Replace K and repeat lab at 1700  - Trend renal fx    All pertinent information and plan of care discussed with the rounding/attending cardiologist.    Multiple medical conditions with risk of decompensation. All questions and concerns were addressed to the patient. Alternatives to my treatment were discussed. I have discussed the above stated plan with patient and the nurse. The patient verbalized understanding and agreed with the plan. Thank you for allowing to us to participate in the care of Jean Harmon.     SOLEDAD Rosario-CNP  [address]   Office: [phone]

## 2021-02-27 NOTE — CONSULTS
[address]  Advanced CHF/Pulmonary Hypertension   Cardiac Evaluation      Montez Erwin  YOB: 1945    Requesting PHysician:  Dr. Rola Lamar    Chief complaint:  Shortness of breath, swelling    History of Present Illness:  Montez Erwin is a 76 yo female with history of CAD/CABG in 2018, prosthetic mitral valve replacement, PAF with Maze 2018, HTN, HLD, DVT/PE on warfarin. She came in today to the office for urgent visit for SOB and increased swelling. She was in a wheel chair with her . Her weight was up 13 pounds. Her weights at home increased from 198 to 212. She takes metolazone 2.5 mg twice a week and last one was 3 days ago. She takes torsemide and spironolactone at home. She has increased abdominal girth and swelling from ankles to mid thighs. No ACEI/ARB due to soft blood pressure. She received her first COVID vaccine. She was given a prednisone taper by PCP about 2 weeks ago. She admits to not being compliant with her salt intake. We are consulted for management of HF    Labs:  BNP 5497 (was 4321 12/15/20)  BUN/Cre 41/1.5  H/H 12.1/38.2    Meds Prior to Admission:  Prednisone 10 qd  Torsemide 100 qd  Spironolactone 50 mg po qd  Metolazone 2.5 daily as needed  Carvedilol 25 bid  KCl 10 qd  warfarin    Echo:  11/30/20:   Dobutamine echocardiogram for aortic stenosis. Very technically limited exam due to atrial fibrillation. Baseline echocardiogram shows severe left ventricular dysfunction with   anterior, lateral and apical hypokinesis with an ejection fraction of 20-25   %. There is no improvement in wall motion with low or high dose dobutamine   suggestive of nonviable myocardium. Mild prosthetic aortic valve stenosis with a mean gradient of 16mmHg and   peak velocity of 2.47m/s at rest. After dobutamine stress the mean AV   gradient rises to 20mmHg with 20mcg of dobutamine consistent with mild to   moderate aortic stenosis.    Prosthetic mitral valve with a mean gradient of 7mmHg    Allergies   Allergen Reactions    Qvahxsth-Itdcwfp-Qzdhab [Fluocinolone] Shortness Of Breath    Ciprofloxacin Shortness Of Breath    Diovan [Valsartan] Shortness Of Breath    Flagyl [Metronidazole] Shortness Of Breath     Has taken diflucan at home 12/7/15    Metformin And Related [Metformin And Related] Shortness Of Breath    Benazepril      Other reaction(s): Not Recorded    Morphine      Bad reaction. \"makes her feel horrible\".      Saxagliptin      Other reaction(s): Not Recorded    Levaquin [Levofloxacin] Rash     Current Facility-Administered Medications   Medication Dose Route Frequency Provider Last Rate Last Admin    sodium chloride flush 0.9 % injection 10 mL  10 mL Intravenous 2 times per day Sohan Mark MD   10 mL at 02/26/21 2107    sodium chloride flush 0.9 % injection 10 mL  10 mL Intravenous PRN Sohan Mark MD        promethazine (PHENERGAN) tablet 12.5 mg  12.5 mg Oral Q6H PRN Sohan Mark MD        Or    [facility]etron [facility]) injection 4 mg  4 mg Intravenous Q6H PRN Sohan Mark MD        polyethylene glycol (GLYCOLAX) packet 17 g  17 g Oral Daily PRN Sohan Mark MD        acetaminophen (TYLENOL) tablet 650 mg  650 mg Oral Q6H PRN Sohan Mrak MD   650 mg at 02/26/21 1739    Or    acetaminophen (TYLENOL) suppository 650 mg  650 mg Rectal Q6H PRN Sohan Mark MD        insulin lispro (1 Unit Dial) 0-12 Units  0-12 Units Subcutaneous TID WC Sohan Mark MD   2 Units at 02/26/21 1846    insulin lispro (1 Unit Dial) 0-6 Units  0-6 Units Subcutaneous Nightly Sohan Mark MD        glucose (GLUTOSE) 40 % oral gel 15 g  15 g Oral PRN Sohan Mark MD        dextrose 50 % IV solution  12.5 g Intravenous PRN Sohan Mark MD        glucagon (rDNA) injection 1 mg  1 mg Intramuscular PRN Sohan Mark MD        dextrose 5 % solution  100 mL/hr Intravenous PRN Sohan Sanders MD        albuterol sulfate  (90 Base) MCG/ACT inhaler 2 puff  2 puff Inhalation Q6H PRN Sohan Sanders MD        gabapentin (NEURONTIN) capsule 300 mg  300 mg Oral Nightly Sohan Sanders MD   300 mg at 02/26/21 2107    [START ON 2/27/2021] insulin glargine (LANTUS;BASAGLAR) injection pen 30 Units  30 Units Subcutaneous QAM Sohan Sanders MD        montelukast (SINGULAIR) tablet 10 mg  10 mg Oral Nightly Sohan Sanders MD   10 mg at 02/26/21 2107    metOLazone (ZAROXOLYN) tablet 2.5 mg  2.5 mg Oral Daily Sohan Sanders MD   Stopped at 02/26/21 1623    [START ON 2/27/2021] pantoprazole (PROTONIX) tablet 40 mg  40 mg Oral QAM AC Sohan Sanders MD        spironolactone (ALDACTONE) tablet 50 mg  50 mg Oral Daily Sohan Sanders MD   Stopped at 02/26/21 1621    furosemide (LASIX) injection 40 mg  40 mg Intravenous BID Irineo Palomino MD   40 mg at 02/26/21 2107    oxyCODONE (ROXICODONE) immediate release tablet 10 mg  10 mg Oral Q3H PRN Sohan Sanders MD   10 mg at 02/26/21 1600    warfarin (COUMADIN) daily dosing (placeholder)   Other RX Elmer Nicolas MD           Past Medical History:   Diagnosis Date    Asthma     Atrial fibrillation ([facility] [address]     Eosinophilia     Hemoptysis     HIGH CHOLESTEROL     Hx of blood clots     Hypertension     Irregular heart beat     Other specified gastritis without mention of hemorrhage     Palpitations     Skin cancer     basal and squamous    Type II or unspecified type diabetes mellitus without mention of complication, not stated as uncontrolled      Past Surgical History:   Procedure Laterality Date    BRONCHOSCOPY  07/18/2016    Dr. Mary Alice Watson - brushings from [address] [place] [address],42-10  08/16/2018    Dr. Tess Golden  02/07/2017    Dr. Espana Kitchen - sigmoid diverticulosis, polypectomies x3    COLONOSCOPY  01/17/2014    Dr. Keo Whiting - sigmoid diverticulosis, polypectomies x3, internal hemorrhoids    COLONOSCOPY  10/10/2018    w/biopsy performed by Oanh Christensen MD at [facility] COLONOSCOPY N/A 8/7/2020    COLONOSCOPY DIAGNOSTIC performed by Christine Ch MD at [address]  08/21/2018    Dr. Bimal Mack - x3 (LIMA-LAD, L SV-D1-PLV) modified BL MAZE procedure w/obliteration of WAYNE using 45mm AtriClip    HYSTERECTOMY      INSERTABLE CARDIAC MONITOR Left 08/16/2018    Dr. Chio Nur [facility]# [id number] [employer]    MITRAL VALVE REPLACEMENT  08/21/2018    Dr. Bimal Mack - 27mm Medtronic Cinch tissue valve    PAIN MANAGEMENT PROCEDURE N/A 12/18/2020    C6-C7 MIDLINE  EPIDURAL STEROID INJECTION WITH FLUOROSCOPY performed by Cinthia Ramos MD at [address] Bilateral 1/18/2021    BILATERAL T11 TRANSFORAMINAL EPIDURAL STEROID INJECTION WITH FLUOROSCOPY performed by Cinthia Ramos MD at [address] TRANSESOPHAGEAL ECHOCARDIOGRAM  08/21/2018    during CABG/MVR    TUNNELED VENOUS CATHETER PLACEMENT Left 08/23/2018    Dr. Heena Galo -  for HD---since removed    UPPER GASTROINTESTINAL ENDOSCOPY N/A 10/10/2018    w/biopsy performed by Oanh Christensen MD at [facility]     Family History   Problem Relation Age of Onset    Cancer Father     Asthma Mother     Hypertension Mother     Heart Disease Mother     High Blood Pressure Mother      Social History     Socioeconomic History    Marital status:      Spouse name: Not on file    Number of children: Not on file    Years of education: Not on file    Highest education level: Not on file   Occupational History    Not on file   Social Needs    Financial resource strain: Not on file    Food insecurity     Worry: Not on file     Inability: Not on file    Transportation needs     Medical: Not on file     Non-medical: Not on file Tobacco Use    Smoking status: Never Smoker    Smokeless tobacco: Never Used   Substance and Sexual Activity    Alcohol use: No    Drug use: No    Sexual activity: Not on file   Lifestyle    Physical activity     Days per week: Not on file     Minutes per session: Not on file    Stress: Not on file   Relationships    Social connections     Talks on phone: Not on file     Gets together: Not on file     Attends [employer] service: Not on file     Active member of club or organization: Not on file     Attends meetings of clubs or organizations: Not on file     Relationship status: Not on file    Intimate partner violence     Fear of current or ex partner: Not on file     Emotionally abused: Not on file     Physically abused: Not on file     Forced sexual activity: Not on file   Other Topics Concern    Not on file   Social History Narrative    Not on file       Review of Systems:   · Constitutional: there has been no unanticipated weight loss. There's been no change in energy level, sleep pattern, or activity level. · Eyes: No visual changes or diplopia. No scleral icterus. · ENT: No Headaches, hearing loss or vertigo. No mouth sores or sore throat. · Cardiovascular: Reviewed in HPI  · Respiratory: No cough or wheezing, no sputum production. No hematemesis. · Gastrointestinal: No abdominal pain, appetite loss, blood in stools. No change in bowel or bladder habits. · Genitourinary: No dysuria, trouble voiding, or hematuria. · Musculoskeletal:  No gait disturbance, weakness or joint complaints. · Integumentary: No rash or pruritis. · Neurological: No headache, diplopia, change in muscle strength, numbness or tingling. No change in gait, balance, coordination, mood, affect, memory, mentation, behavior. · Psychiatric: No anxiety, no depression. · Endocrine: No malaise, fatigue or temperature intolerance. No excessive thirst, fluid intake, or urination. No tremor.   · Hematologic/Lymphatic: No abnormal bruising or bleeding, blood clots or swollen lymph nodes. · Allergic/Immunologic: No nasal congestion or hives. Physical Examination:    Vitals:    02/26/21 1530 02/26/21 1700 02/26/21 1715 02/26/21 2043   BP: 99/66   102/77   Pulse: 107  116 105   Resp:    16   Temp: 97.6 °F (36.4 °C)   97.7 °F (36.5 °C)   TempSrc: Oral   Oral   SpO2: 97% 97%  97%   Weight:         Body mass index is 32.08 kg/m². Wt Readings from Last 3 Encounters:   02/26/21 211 lb (95.7 kg)   02/26/21 213 lb (96.6 kg)   01/18/21 200 lb (90.7 kg)     BP Readings from Last 3 Encounters:   02/26/21 102/77   02/26/21 100/67   02/12/21 110/72     Constitutional and General Appearance:   WD/WN in NAD  HEENT:  NC/AT  MARIBEL  No problems with hearing  Skin:  Warm, dry  Respiratory:  · Normal excursion and expansion without use of accessory muscles  · Resp Auscultation: Normal breath sounds without dullness  Cardiovascular:  · The apical impulses not displaced  · Heart tones are crisp and normal  · Cervical veins are not engorged  · The carotid upstroke is normal in amplitude and contour without delay or bruit  · JVP elevated to angle of jaw  RRR with nl S1 and S2 without m,r,g  · Peripheral pulses are symmetrical and full  · There is no clubbing, cyanosis of the extremities. · 2+ bilateral edema  · Femoral Arteries: 2+ and equal  · Pedal Pulses: 2+ and equal   Neck:  · No thyromegaly  Abdomen:  Increased abdominal girth  · No masses or tenderness  · Liver/Spleen: No Abnormalities Noted  Neurological/Psychiatric:  · Alert and oriented in all spheres  · Moves all extremities well  · Exhibits normal gait balance and coordination  · No abnormalities of mood, affect, memory, mentation, or behavior are noted    Labs were reviewed including labs from other hospital systems through [facility].   Cardiac testing was reviewed including echos, nuclear scans, cardiac catheterization, including from other hospital systems through [facility] Everywhere. Assessment:    1. Acute on chronic systolic HF  2.  ckd  3. CAD, s/p CABG  4. Aortic stenosis    Plan:  1. Lasix 40 mg IV bid  2. Metolazone 2.5 mg po qd  3.  Spironolactone 50 mg po qd  4. Not on ACEI or ARB due to relative hypotension  5. Hold carvedilol for now, while trying to diurese  6. CHF education reinforced. ~salt restriction  ~fluid restriction  ~medication compliance  ~daily weights and notify of any significant weight gain/loss  ~establish with CHF nurse  ~outpatient follow-up with our CHF team        I appreciate the opportunity of cooperating in the care of this patient.     Chacorta Lofton M.D., [facility]

## 2021-02-28 LAB
ANION GAP SERPL CALCULATED.3IONS-SCNC: 10 MMOL/L (ref 3–16)
BILIRUBIN URINE: NEGATIVE
BLOOD, URINE: NEGATIVE
BUN BLDV-MCNC: 54 MG/DL (ref 7–20)
CALCIUM SERPL-MCNC: 8.6 MG/DL (ref 8.3–10.6)
CHLORIDE BLD-SCNC: 96 MMOL/L (ref 99–110)
CLARITY: CLEAR
CO2: 30 MMOL/L (ref 21–32)
COLOR: YELLOW
CREAT SERPL-MCNC: 1.9 MG/DL (ref 0.6–1.2)
GFR AFRICAN AMERICAN: 31
GFR NON-AFRICAN AMERICAN: 26
GLUCOSE BLD-MCNC: 120 MG/DL (ref 70–99)
GLUCOSE BLD-MCNC: 141 MG/DL (ref 70–99)
GLUCOSE BLD-MCNC: 144 MG/DL (ref 70–99)
GLUCOSE BLD-MCNC: 149 MG/DL (ref 70–99)
GLUCOSE BLD-MCNC: 164 MG/DL (ref 70–99)
GLUCOSE BLD-MCNC: 243 MG/DL (ref 70–99)
GLUCOSE URINE: NEGATIVE MG/DL
INR BLD: 2.34 (ref 0.86–1.14)
KETONES, URINE: NEGATIVE MG/DL
LEUKOCYTE ESTERASE, URINE: NEGATIVE
MICROSCOPIC EXAMINATION: NORMAL
NITRITE, URINE: NEGATIVE
PERFORMED ON: ABNORMAL
PH UA: 7 (ref 5–8)
POTASSIUM SERPL-SCNC: 3.1 MMOL/L (ref 3.5–5.1)
PROTEIN UA: NEGATIVE MG/DL
PROTHROMBIN TIME: 27.4 SEC (ref 10–13.2)
SODIUM BLD-SCNC: 136 MMOL/L (ref 136–145)
SPECIFIC GRAVITY UA: 1.01 (ref 1–1.03)
URINE TYPE: NORMAL
UROBILINOGEN, URINE: 1 E.U./DL

## 2021-02-28 PROCEDURE — 99233 SBSQ HOSP IP/OBS HIGH 50: CPT | Performed by: NURSE PRACTITIONER

## 2021-02-28 PROCEDURE — 81003 URINALYSIS AUTO W/O SCOPE: CPT

## 2021-02-28 PROCEDURE — 94760 N-INVAS EAR/PLS OXIMETRY 1: CPT

## 2021-02-28 PROCEDURE — 85610 PROTHROMBIN TIME: CPT

## 2021-02-28 PROCEDURE — 84156 ASSAY OF PROTEIN URINE: CPT

## 2021-02-28 PROCEDURE — 6370000000 HC RX 637 (ALT 250 FOR IP): Performed by: INTERNAL MEDICINE

## 2021-02-28 PROCEDURE — 6360000002 HC RX W HCPCS: Performed by: INTERNAL MEDICINE

## 2021-02-28 PROCEDURE — 6370000000 HC RX 637 (ALT 250 FOR IP): Performed by: HOSPITALIST

## 2021-02-28 PROCEDURE — 82570 ASSAY OF URINE CREATININE: CPT

## 2021-02-28 PROCEDURE — 2580000003 HC RX 258: Performed by: INTERNAL MEDICINE

## 2021-02-28 PROCEDURE — 84300 ASSAY OF URINE SODIUM: CPT

## 2021-02-28 PROCEDURE — 1200000000 HC SEMI PRIVATE

## 2021-02-28 PROCEDURE — 80048 BASIC METABOLIC PNL TOTAL CA: CPT

## 2021-02-28 RX ORDER — WARFARIN SODIUM 5 MG/1
5 TABLET ORAL
Status: COMPLETED | OUTPATIENT
Start: 2021-02-28 | End: 2021-02-28

## 2021-02-28 RX ADMIN — MONTELUKAST SODIUM 10 MG: 10 TABLET, FILM COATED ORAL at 21:31

## 2021-02-28 RX ADMIN — SPIRONOLACTONE 50 MG: 25 TABLET ORAL at 07:56

## 2021-02-28 RX ADMIN — POTASSIUM BICARBONATE 40 MEQ: 782 TABLET, EFFERVESCENT ORAL at 10:38

## 2021-02-28 RX ADMIN — INSULIN LISPRO 4 UNITS: 100 INJECTION, SOLUTION INTRAVENOUS; SUBCUTANEOUS at 11:03

## 2021-02-28 RX ADMIN — METOLAZONE 2.5 MG: 2.5 TABLET ORAL at 07:56

## 2021-02-28 RX ADMIN — WARFARIN SODIUM 5 MG: 5 TABLET ORAL at 17:58

## 2021-02-28 RX ADMIN — OXYCODONE 10 MG: 5 TABLET ORAL at 21:40

## 2021-02-28 RX ADMIN — GABAPENTIN 300 MG: 300 CAPSULE ORAL at 21:32

## 2021-02-28 RX ADMIN — INSULIN LISPRO 2 UNITS: 100 INJECTION, SOLUTION INTRAVENOUS; SUBCUTANEOUS at 08:01

## 2021-02-28 RX ADMIN — INSULIN LISPRO 2 UNITS: 100 INJECTION, SOLUTION INTRAVENOUS; SUBCUTANEOUS at 16:45

## 2021-02-28 RX ADMIN — Medication 10 ML: at 21:32

## 2021-02-28 RX ADMIN — PANTOPRAZOLE SODIUM 40 MG: 40 TABLET, DELAYED RELEASE ORAL at 05:32

## 2021-02-28 RX ADMIN — OXYCODONE 10 MG: 5 TABLET ORAL at 11:02

## 2021-02-28 RX ADMIN — FUROSEMIDE 40 MG: 10 INJECTION, SOLUTION INTRAVENOUS at 07:56

## 2021-02-28 RX ADMIN — INSULIN LISPRO 1 UNITS: 100 INJECTION, SOLUTION INTRAVENOUS; SUBCUTANEOUS at 21:43

## 2021-02-28 RX ADMIN — INSULIN GLARGINE 30 UNITS: 100 INJECTION, SOLUTION SUBCUTANEOUS at 08:00

## 2021-02-28 RX ADMIN — Medication 10 ML: at 09:17

## 2021-02-28 ASSESSMENT — PAIN - FUNCTIONAL ASSESSMENT
PAIN_FUNCTIONAL_ASSESSMENT: ACTIVITIES ARE NOT PREVENTED
PAIN_FUNCTIONAL_ASSESSMENT: ACTIVITIES ARE NOT PREVENTED

## 2021-02-28 ASSESSMENT — PAIN DESCRIPTION - PAIN TYPE
TYPE: ACUTE PAIN
TYPE: ACUTE PAIN
TYPE: CHRONIC PAIN
TYPE: ACUTE PAIN

## 2021-02-28 ASSESSMENT — PAIN DESCRIPTION - DESCRIPTORS
DESCRIPTORS: ACHING;CONSTANT
DESCRIPTORS: SORE
DESCRIPTORS: ACHING;CONSTANT

## 2021-02-28 ASSESSMENT — PAIN SCALES - GENERAL
PAINLEVEL_OUTOF10: 7
PAINLEVEL_OUTOF10: 7
PAINLEVEL_OUTOF10: 5
PAINLEVEL_OUTOF10: 7
PAINLEVEL_OUTOF10: 7
PAINLEVEL_OUTOF10: 0
PAINLEVEL_OUTOF10: 5
PAINLEVEL_OUTOF10: 6
PAINLEVEL_OUTOF10: 5
PAINLEVEL_OUTOF10: 6

## 2021-02-28 ASSESSMENT — PAIN DESCRIPTION - LOCATION
LOCATION: BACK

## 2021-02-28 ASSESSMENT — PAIN DESCRIPTION - ORIENTATION
ORIENTATION: MID;LOWER
ORIENTATION: RIGHT;LEFT;UPPER
ORIENTATION: MID;LOWER
ORIENTATION: MID;LOWER

## 2021-02-28 ASSESSMENT — PAIN DESCRIPTION - FREQUENCY
FREQUENCY: INTERMITTENT
FREQUENCY: INTERMITTENT

## 2021-02-28 ASSESSMENT — PAIN DESCRIPTION - ONSET: ONSET: GRADUAL

## 2021-02-28 NOTE — PROGRESS NOTES
[facility]   Cardiology Progress Note   Date: 2/28/2021  Admit Date: 2/26/2021     Reason for follow up: CHF    Chief Complaint: SOB  History of Present Illness: History obtained from patient and medical record. Nimo Gallegos is a 77 y.o. 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 BLE swelling and increased abd girth. Started don IV lasix. Interval Hx: Today, she is being seen for follow up. Down 2 lbs and 2 L overnight. Cr worsening 1.6 ->1.9. Some improvement in edema, continues with LEVIN. No new complaints today. No major events overnight. Denies having chest pain, palpitations, or dizziness. Patient seen and examined. Clinical notes reviewed. Telemetry reviewed. Allergies: Allergies   Allergen Reactions    Eyuyocra-Njqynlm-Kqypmr [Fluocinolone] Shortness Of Breath    Ciprofloxacin Shortness Of Breath    Diovan [Valsartan] Shortness Of Breath    Flagyl [Metronidazole] Shortness Of Breath     Has taken diflucan at home 12/7/15    Metformin And Related [Metformin And Related] Shortness Of Breath    Benazepril      Other reaction(s): Not Recorded    Morphine      Bad reaction. \"makes her feel horrible\".  Saxagliptin      Other reaction(s): Not Recorded    Levaquin [Levofloxacin] Rash       Home Meds:  Prior to Visit Medications    Medication Sig Taking? Authorizing Provider   predniSONE (DELTASONE) 10 MG tablet Take 10 mg by mouth daily Yes Historical Provider, MD   torsemide (DEMADEX) 100 MG tablet TAKE 1 TABLET DAILY Yes Mauricio Sadler MD   OXYCODONE HCL PO Take 10 mg by mouth As of 1/21/2021,  states patient is taking 10mg with edge being taken off her pain after 3 hours.  Yes Historical Provider, MD   ACETAMINOPHEN PO Take 500 mg by mouth every 6 hours as needed  Yes Historical Provider, MD   blood glucose test strips (FREESTYLE LITE) strip USE TO TEST FOUR TIMES A DAY Yes Honey Harmon MD   methocarbamol (ROBAXIN) 750 MG tablet Take 750 mg by mouth as needed (back pain) Yes Historical Provider, MD   spironolactone (ALDACTONE) 50 MG tablet TAKE 1 TABLET DAILY Yes Kilo Rodriguez MD   metOLazone (ZAROXOLYN) 2.5 MG tablet Take 1 tablet by mouth daily  Patient taking differently: Take 2.5 mg by mouth daily as needed  Yes Honey Harmon MD   gabapentin (NEURONTIN) 300 MG capsule Take 1 capsule by mouth nightly for 90 days.  Intended supply: 30 days Yes MUNDO Epstein   albuterol sulfate  (90 Base) MCG/ACT inhaler USE 2 INHALATIONS EVERY 6 HOURS AS NEEDED FOR WHEEZING Yes Honey Harmon MD   carvedilol (COREG) 25 MG tablet Take 1 tablet by mouth 2 times daily Yes Kristina Almazan MD   potassium chloride (KLOR-CON M) 10 MEQ extended release tablet TAKE 1 TABLET DAILY Yes Honey Harmon MD   insulin glargine (LANTUS SOLOSTAR) 100 UNIT/ML injection pen INJECT 50 UNITS IN THE MORNING AND 24 UNITS AT BEDTIME  Patient taking differently: every morning 30 units in the morning and PRN at night if needed Yes Honey Harmon MD   albuterol (PROVENTIL) (2.5 MG/3ML) 0.083% nebulizer solution Take 3 mLs by nebulization every 6 hours as needed for Wheezing Yes Honey Harmon MD   exenatide (BYETTA 10 MCG PEN) 10 MCG/0.04ML injection Inject 0.04 mLs into the skin daily  Patient taking differently: Inject 10 mcg into the skin 2 times daily (with meals)  Yes Honey Harmon MD   polyethylene glycol (GLYCOLAX) 17 GM/SCOOP powder Take 17 g by mouth every other day  Yes Historical Provider, MD   omeprazole (PRILOSEC) 20 MG delayed release capsule Take 20 mg by mouth daily Yes Historical Provider, MD   FreeStyle Lancets MISC 1 each by Does not apply route 4 times daily Yes Honey Harmon MD   ondansetron (ZOFRAN) 4 MG tablet Take 1 tablet by mouth 3 times daily as needed for Nausea or Vomiting Yes Silvia Cullen MD Alfred   warfarin (COUMADIN) 5 MG tablet TAKE 1 TABLET DAILY  Patient taking differently: Take 5 mg by mouth daily Managed by PCP Yes Marline Spatz, MD   montelukast (SINGULAIR) 10 MG tablet TAKE 1 TABLET NIGHTLY Yes Marline Spatz, MD   Blood Glucose Monitoring Suppl (EMBRACE PRO GLUCOSE METER) GAETANO 1 Device by Does not apply route 4 times daily Yes Valeria Flowers MD   HUMALOG KWIKPEN 100 UNIT/ML SOPN TAKE AS INSTRUCTED BY YOUR PRESCRIBER  Patient taking differently:  Only if high blood sugar-has not been using Yes Valeria Flowers MD   nystatin (MYCOSTATIN) 497858 UNIT/ML suspension TAKE ONE TEASPOONFUL BY MOUTH FOUR TIMES A DAY FOR 5 DAYS Yes Valeria Flowers MD   aspirin 81 MG chewable tablet Take 1 tablet by mouth daily Yes Valeria Flowers MD   BD PEN NEEDLE JANNET U/F 32G X 4 MM MISC USE 1 PEN NEEDLE FOUR TIMES A DAY Yes Valeria Flowers MD   vitamin B-12 500 MCG tablet Take 1 tablet by mouth daily Yes Nisha Madden MD      Scheduled Meds:   exenatide  10 mcg Subcutaneous BID WC    sodium chloride flush  10 mL Intravenous 2 times per day    insulin lispro  0-12 Units Subcutaneous TID WC    insulin lispro  0-6 Units Subcutaneous Nightly    gabapentin  300 mg Oral Nightly    insulin glargine  30 Units Subcutaneous QAM    montelukast  10 mg Oral Nightly    metOLazone  2.5 mg Oral Daily    pantoprazole  40 mg Oral QAM AC    spironolactone  50 mg Oral Daily    furosemide  40 mg Intravenous BID    warfarin (COUMADIN) daily dosing (placeholder)   Other RX Placeholder     Continuous Infusions:   dextrose       PRN Meds:potassium chloride **OR** potassium alternative oral replacement **OR** potassium chloride, sodium chloride flush, promethazine **OR** ondansetron, polyethylene glycol, acetaminophen **OR** acetaminophen, glucose, dextrose, glucagon (rDNA), dextrose, albuterol sulfate HFA, oxyCODONE     Past Medical History:  Past Medical History:   Diagnosis Date    Asthma     Atrial fibrillation (HCC)     Eosinophilia     Hemoptysis     HIGH CHOLESTEROL     Hx of blood clots     Hypertension     Irregular heart beat     Other specified gastritis without mention of hemorrhage     Palpitations     Skin cancer     basal and squamous    Type II or unspecified type diabetes mellitus without mention of complication, not stated as uncontrolled         Past Surgical History:    has a past surgical history that includes Cholecystectomy;  section; Colonoscopy (2017); skin biopsy; bronchoscopy (2016); Coronary artery bypass graft (2018); Mitral valve replacement (2018); transesophageal echocardiogram (2018); Tunneled venous catheter placement (Left, 2018); Cardiac catheterization (2018); Insertable Cardiac Monitor (Left, 2018); Colonoscopy (2014); Hysterectomy; Upper gastrointestinal endoscopy (N/A, 10/10/2018); Colonoscopy (10/10/2018); Colonoscopy (N/A, 2020); Pain management procedure (N/A, 2020); and Pain management procedure (Bilateral, 2021). Social History:  Reviewed. reports that she has never smoked. She has never used smokeless tobacco. She reports that she does not drink alcohol or use drugs. Family History:  Reviewed. family history includes Asthma in her mother; Cancer in her father; Heart Disease in her mother; High Blood Pressure in her mother; Hypertension in her mother.    Denies family history of sudden cardiac death, arrhythmia, premature CAD    Review of Systems:  · Constitutional: Negative for fever or weakness + weight loss  · Skin: Negative for rash bruising, bleeding  · HEENT: Negative for vision changes, ringing in the ears  · Respiratory: Reviewed in HPI  · Cardiovascular: Reviewed in HPI  · Gastrointestinal: Negative for abdominal pain, N/V/D, constipation, or black/tarry stools + abd distention  · Genito-Urinary: Negative for dysuria, or hematuria  · Musculoskeletal: No focal weakness  · Neurological/Psych: Negative for confusion, seizures, headaches, balance issues or TIA-like symptoms. No anxiety, depression, or insomnia    Physical Examination:  Vitals:    02/28/21 0753   BP: (!) 108/92   Pulse: 78   Resp: 16   Temp: 97.3 °F (36.3 °C)   SpO2: 97%      In: 720 [P.O.:720]  Out: 2025    Wt Readings from Last 3 Encounters:   02/28/21 207 lb 1.6 oz (93.9 kg)   02/26/21 213 lb (96.6 kg)   01/18/21 200 lb (90.7 kg)       Intake/Output Summary (Last 24 hours) at 2/28/2021 0249  Last data filed at 2/28/2021 0357  Gross per 24 hour   Intake 1200 ml   Output 2825 ml   Net -1625 ml     Telemetry: Personally Reviewed Atrial fibrillation   · Constitutional: Cooperative and in no apparent distress, and appears well nourished  · Skin: Warm and pink; no pallor, cyanosis, bruising, or clubbing  · HEENT: Symmetric and normocephalic. Conjunctiva pink with clear sclera. Mucus membranes pink and moist.   · Cardiovascular: irregular and rhythm. S1 & S2, no murmurs, rubs, or gallops. Peripheral pulses 2+, capillary refill < 3 seconds. positiveelevation of JVP. 2+ Peripheral edema abd distension  · Respiratory: Respirations symmetric and unlabored. Lungs no wheezing or rhonchi + BLL crackles  · Gastrointestinal: Abdomen distended and round. Bowel sounds normoactive in all quadrants. · Musculoskeletal: No focal weakness  · Neurologic/Psych: Awake and orientated to person, place and time. Calm affect, appropriate mood    Pertinent labs, diagnostic, device, and imaging results reviewed as a part of this visit    Labs:    BMP:   Recent Labs     02/26/21  1511 02/27/21  0546 02/27/21  1722 02/28/21  0609    137  --  136   K 3.5 3.0* 3.3* 3.1*   CL 97* 96*  --  96*   CO2 29 29  --  30   BUN 41* 44*  --  54*   CREATININE 1.5* 1.6*  --  1.9*   MG 2.10  --   --   --      Estimated Creatinine Clearance: 31 mL/min (A) (based on SCr of 1.9 mg/dL (H)).    CBC:   Recent Labs     02/26/21  1511 02/27/21  0546   WBC 8.0 6.2   HGB 12.1 11.9*   HCT 38.2 37.4   MCV 86.8 86.9    207     Thyroid:   Lab Results   Component Value Date    TSH 2.01 10/21/2020     Lipids:   Lab Results   Component Value Date    CHOL 149 2020    HDL 40 2020    TRIG 60 2020     LFTS:   Lab Results   Component Value Date    ALT 13 2021    AST 15 2021    ALKPHOS 143 2021    PROT 5.9 2021    AGRATIO 1.1 2021    BILITOT 0.4 2021     Cardiac Enzymes:   Lab Results   Component Value Date    TROPONINI <0.01 05/10/2020    TROPONINI <0.01 2020    TROPONINI <0.01 2019     Coags:   Lab Results   Component Value Date    PROTIME 27.4 2021    INR 2.34 2021     EC2021: Atrial fibrillation with rapid ventricular response  T wave abnormality, consider lateral ischemia    ECHO:  2020    Stress Test:    Summary   Dobutamine echocardiogram for aortic stenosis. Very technically limited exam due to atrial fibrillation. Baseline echocardiogram shows severe left ventricular dysfunction with   anterior, lateral and apical hypokinesis with an ejection fraction of 20-25   %. There is no improvement in wall motion with low or high dose dobutamine suggestive of nonviable myocardium. Mild prosthetic aortic valve stenosis with a mean gradient of 16mmHg and peak velocity of 2.47m/s at rest. After dobutamine stress the mean AV gradient rises to 20mmHg with 20mcg of dobutamine consistent with mild to moderate aortic stenosis.    Prosthetic mitral valve with a mean gradient of 7mmHg   Cath:    Problem List:   Patient Active Problem List    Diagnosis Date Noted    CHF (congestive heart failure), NYHA class I, acute on chronic, combined ([facility] [address] 2021    Stage 3 chronic kidney disease     Coronary artery disease involving native heart without angina pectoris     DVT (deep vein thrombosis) in pregnancy 12/15/2020    Nonrheumatic aortic valve stenosis 2020    Pneumatosis intestinalis 05/10/2020    Ischemic cardiomyopathy 01/20/2020    Occlusion and stenosis of bilateral carotid arteries 01/20/2020    Persistent atrial fibrillation ([facility] [address] 10/30/2019    S/P MVR (mitral valve repair)     Thoracic degenerative disc disease 06/07/2019    Chronic combined systolic (congestive) and diastolic (congestive) heart failure ([facility] [address] 01/15/2019    Obesity, Class I, BMI 30-34.9     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    Goiter 09/07/2018    S/P CABG x 3 08/22/2018    Coronary artery disease due to lipid rich plaque     Nonrheumatic mitral valve regurgitation     Encounter for loop recorder check 08/16/2018    Cardiac arrhythmia     PAF (paroxysmal atrial fibrillation) ([facility] [facility]     Hypertension     Asthma 01/12/2018    Type 2 diabetes mellitus with hyperglycemia, with long-term current use of insulin ([facility] [address] 04/27/2017    Primary osteoarthritis of both knees 03/21/2017    Multiple pulmonary nodules 08/01/2016    History of pulmonary embolism     B12 deficiency 09/08/2014    Paroxysmal SVT (supraventricular tachycardia) ([facility] [address] 26/71/9353    Eosinophilic gastritis 96/01/8648    Generalized osteoarthrosis, involving multiple sites 03/25/2013    Long term current use of anticoagulant 12/19/2012    Hypercholesteremia 12/19/2012      Assessment and Plan:   Acute on Chronic systolic and diastolic heart failure (NYHA Class III)  - Appears overloaded               ~ EF 25-30%  per echo   - Continue IV lasix 40 mg bid   - Continue aldactone and metolazone   - Weight down 2 lbs, about 10 more to go    - Strict I&O, daily wts, and sodium/fluid restriction  Chronic Atrial fibrillation   - CVR   - S/p Maze   - Continue warfarin (INR 3.43), pharmacy to dose  CAD   - S/p CABG   - No reports of angina   - Continue medical therapy  AS   - Continue diuresis  CKD/TORIBIO   - Creatinine worse today 1.9   - BMP daily    - Consider nephrology consult  Hypokalemia   - Monitor and replace per s/s order    - Hold evening lasix and metolazone for today and consider nephrology consult  - Trend renal fx    All pertinent information and plan of care discussed with the rounding/attending cardiologist.    Multiple medical conditions with risk of decompensation. All questions and concerns were addressed to the patient. Alternatives to my treatment were discussed. I have discussed the above stated plan with patient and the nurse. The patient verbalized understanding and agreed with the plan. Thank you for allowing to us to participate in the care of Rosette Dan.     SOLEDAD Bhardwaj-JOSE [address]  [address]   Office: [phone]

## 2021-02-28 NOTE — PROGRESS NOTES
Placeholder        Objective:  BP (!) 106/55   Pulse 104   Temp 98.2 °F (36.8 °C) (Oral)   Resp 16   Ht 5' 8\" (1.727 m)   Wt 207 lb 1.6 oz (93.9 kg)   SpO2 97%   BMI 31.49 kg/m²     Intake/Output Summary (Last 24 hours) at 2/28/2021 1302  Last data filed at 2/28/2021 1042  Gross per 24 hour   Intake 960 ml   Output 3325 ml   Net -2365 ml      Wt Readings from Last 3 Encounters:   02/28/21 207 lb 1.6 oz (93.9 kg)   02/26/21 213 lb (96.6 kg)   01/18/21 200 lb (90.7 kg)       General appearance:  Appears comfortable  Eyes: Sclera clear. Pupils equal.  ENT: Moist oral mucosa. Trachea midline, no adenopathy. Cardiovascular: Regular rhythm, normal S1, S2. No murmur. No edema in lower extremities  Respiratory: Not using accessory muscles. Good inspiratory effort. Clear to auscultation bilaterally, no wheeze or crackles. GI: Abdomen soft, no tenderness, not distended, normal bowel sounds  Musculoskeletal: No cyanosis in digits, neck supple  Neurology: CN 2-12 grossly intact. No speech or motor deficits  Psych: Normal affect.  Alert and oriented in time, place and person  Skin: Warm, dry, normal turgor    Labs and Tests:  CBC:   Recent Labs     02/26/21  1511 02/27/21  0546   WBC 8.0 6.2   HGB 12.1 11.9*    207     BMP:    Recent Labs     02/26/21  1511 02/27/21  0546 02/27/21  1722 02/28/21  0609    137  --  136   K 3.5 3.0* 3.3* 3.1*   CL 97* 96*  --  96*   CO2 29 29  --  30   BUN 41* 44*  --  54*   CREATININE 1.5* 1.6*  --  1.9*   GLUCOSE 153* 79  --  120*     Hepatic:   Recent Labs     02/26/21  1511   AST 15   ALT 13   BILITOT 0.4   ALKPHOS 143*       Discussed care with family and patient             Spent 30  minutes with patient and family at bedside and on unit reviewing medical records and labs, spent greater than 50% time counseling patient and family on diagnosis and plan   Problem List  Active Problems:    CHF (congestive heart failure), NYHA class I, acute on chronic, combined ([facility]

## 2021-02-28 NOTE — PROGRESS NOTES
Clinical Pharmacy Note     Metolazone 2.5mg daily and spironolactone 50mg daily placed on hold by Michelle Joseph. Order discontinued per protocol. Please assess and reorder when appropriate. Thank you for allowing us to participate in the care of this patient.      Bello Mcintyre, PharmD

## 2021-02-28 NOTE — PROGRESS NOTES
Assessment and med pass complete. Meds passed whole with water. Sitting up in chair, daughter at side. Complaining of cramping in hands, received potassium at 1700, gave tylenol. Ambulates independently, informed to call if needing assist. Denies other needs, call light in reach.

## 2021-02-28 NOTE — PLAN OF CARE
Problem: OXYGENATION/RESPIRATORY FUNCTION  Goal: Patient will maintain patent airway  Outcome: Ongoing  Note: Pt is on RA, not requiring 02. Pt has swelling on BLE, on IV Lasix. Problem: FLUID AND ELECTROLYTE IMBALANCE  Goal: Fluid and electrolyte balance are achieved/maintained  Outcome: Ongoing  Note: Potassium 3.1 this morning, will replace per protocol. Problem: Pain:  Goal: Pain level will decrease  Description: Pain level will decrease  Note: C/o of chronic back pain 6/10 in her back. Will administered pain medication as needed.

## 2021-02-28 NOTE — CONSULTS
Nephrology Consult Note  [phone]   [email or web address].        Reason for Consult: TORIBIO/ CKD / Edema     HISTORY OF PRESENT ILLNESS:      The patient is a 77 y.o. female with significant past medical history of Coronary  artery disease s/p CABG, congestive heart failure , chronic kidney disease stage IIIa , atrial fibrillation , T2DM  hyperlipidemia , hypertension who presented to the ER with progressive swelling of her legs and ankles for the last week. She says she has not been measuring her daily weights, but she says she has been watching her salt intake and taking her prescribed diuretics. She has an aching pain over her left kidney area however there is no dysuria or gross hematuria. There is no fever, cough hemoptysis  She has no nausea vomiting diarrhea  Prior to admission she was taking diuretics Demadex with metolazone and spironolactone.   She is not on NSAIDs or ACE inhibitor or ARB  There is no skin new skin rash or arthralgias      Past Medical History:        Diagnosis Date    Asthma     Atrial fibrillation (HCC)     Eosinophilia     Hemoptysis     HIGH CHOLESTEROL     Hx of blood clots     Hypertension     Irregular heart beat     Other specified gastritis without mention of hemorrhage     Palpitations     Skin cancer     basal and squamous    Type II or unspecified type diabetes mellitus without mention of complication, not stated as uncontrolled        Past Surgical History:        Procedure Laterality Date    BRONCHOSCOPY  07/18/2016    Dr. Natali Mesa - brushings from [address] [place] [address]  08/16/2018    Dr. Yenny Bello  02/07/2017    Dr. Cassy Shine - sigmoid diverticulosis, polypectomies x3    COLONOSCOPY  01/17/2014    Dr. Cassy Shine - sigmoid diverticulosis, polypectomies x3, internal hemorrhoids    COLONOSCOPY  10/10/2018    w/biopsy performed by Anthony Hebert MD at [address] [place] [address] N/A 8/7/2020    COLONOSCOPY DIAGNOSTIC performed by Natali Mesa MD at [address]  08/21/2018    Dr. Otero Exon - x3 (LIMA-LAD, L SV-D1-PLV) modified BL MAZE procedure w/obliteration of WAYNE using 45mm AtriClip    HYSTERECTOMY      INSERTABLE CARDIAC MONITOR Left 08/16/2018    Dr. Brianna De La Cruz - [facility]# [id number] [employer]    MITRAL VALVE REPLACEMENT  08/21/2018    Dr. Branden Young - 27mm [employer] Cinch tissue valve    PAIN MANAGEMENT PROCEDURE N/A 12/18/2020    C6-C7 MIDLINE  EPIDURAL STEROID INJECTION WITH FLUOROSCOPY performed by Loco Adan MD at [address] Bilateral 1/18/2021    BILATERAL T11 TRANSFORAMINAL EPIDURAL STEROID INJECTION WITH FLUOROSCOPY performed by Loco Adan MD at [address]  TRANSESOPHAGEAL ECHOCARDIOGRAM  08/21/2018    during CABG/MVR    TUNNELED VENOUS CATHETER PLACEMENT Left 08/23/2018    Dr. Tere Armenta for HD---since removed    UPPER GASTROINTESTINAL ENDOSCOPY N/A 10/10/2018    w/biopsy performed by Anthony Hebert MD at [address] [place] [address]       Current Medications:    No current facility-administered medications on file prior to encounter. Current Outpatient Medications on File Prior to Encounter   Medication Sig Dispense Refill    predniSONE (DELTASONE) 10 MG tablet Take 10 mg by mouth daily      torsemide (DEMADEX) 100 MG tablet TAKE 1 TABLET DAILY 90 tablet 0    OXYCODONE HCL PO Take 10 mg by mouth As of 1/21/2021,  states patient is taking 10mg with edge being taken off her pain after 3 hours.       ACETAMINOPHEN PO Take 500 mg by mouth every 6 hours as needed       blood glucose test strips (FREESTYLE LITE) strip USE TO TEST FOUR TIMES A  strip 4    methocarbamol (ROBAXIN) 750 MG tablet Take 750 mg by mouth as needed (back pain)      spironolactone (ALDACTONE) 50 MG tablet TAKE 1 TABLET DAILY 90 tablet 3    metOLazone (ZAROXOLYN) 2.5 MG tablet Take 1 tablet by Family History:       Problem Relation Age of Onset    Cancer Father     Asthma Mother     Hypertension Mother     Heart Disease Mother     High Blood Pressure Mother        REVIEW OF SYSTEMS:      10 pt ROS done, relevant features as in [place], rest negative       PHYSICAL EXAM:    Vitals:    BP (!) 108/92   Pulse 78   Temp 97.3 °F (36.3 °C) (Tympanic)   Resp 16   Ht 5' 8\" (1.727 m)   Wt 207 lb 1.6 oz (93.9 kg)   SpO2 97%   BMI 31.49 kg/m²   I/O last 3 completed shifts: In: 1200 [P.O.:1200]  Out: 3225 [Urine:3225]  I/O this shift:  In: 240 [P.O.:240]  Out: -     Physical Exam:  Gen:  alert, oriented x 3, dyspnea+  PERRL , EOM +  Pallor +, No icterus  JVP  raised   CV: RR , tachycardia ,  no murmur or rub . Mid sternal scar of CABG   Lungs:B/ L air entry, Normal breath sounds   Abd: soft, bowel sounds + , No organomegaly , scars+  Ext: 2+ leg and ankle  edema, no cyanosis  Skin: Warm.   No rash  Neuro: nonfocal.      DATA:    CBC with Differential:    Lab Results   Component Value Date    WBC 6.2 02/27/2021    RBC 4.30 02/27/2021    HGB 11.9 02/27/2021    HCT 37.4 02/27/2021     02/27/2021    MCV 86.9 02/27/2021    MCH 27.7 02/27/2021    MCHC 31.9 02/27/2021    RDW 16.6 02/27/2021    SEGSPCT 64.1 09/02/2020    BANDSPCT 1 01/14/2019    LYMPHOPCT 20.1 12/23/2020    MONOPCT 11.7 12/23/2020    BASOPCT 0.7 12/23/2020    MONOSABS 0.6 12/23/2020    LYMPHSABS 1.0 12/23/2020    EOSABS 0.4 12/23/2020    BASOSABS 0.0 12/23/2020     CMP:    Lab Results   Component Value Date     02/28/2021    K 3.1 02/28/2021    K 3.5 02/26/2021    CL 96 02/28/2021    CO2 30 02/28/2021    BUN 54 02/28/2021    CREATININE 1.9 02/28/2021    GFRAA 31 02/28/2021    AGRATIO 1.1 02/26/2021    LABGLOM 26 02/28/2021    LABGLOM 45 12/06/2018    GLUCOSE 120 02/28/2021    PROT 5.9 02/26/2021    LABALBU 3.1 02/26/2021    CALCIUM 8.6 02/28/2021    BILITOT 0.4 02/26/2021    ALKPHOS 143 02/26/2021    AST 15 02/26/2021    ALT 13 02/26/2021     Phosphorus:    Lab Results   Component Value Date    PHOS 3.7 05/12/2020     Troponin:    Lab Results   Component Value Date    TROPONINI <0.01 05/10/2020     U/A:    Lab Results   Component Value Date    COLORU Yellow 02/12/2021    PROTEINU Negative 02/12/2021    PHUR 5.5 02/12/2021    WBCUA 31 05/11/2020    RBCUA neg 02/12/2021    RBCUA 1 05/11/2020    YEAST neg 02/12/2021    BACTERIA trace 02/12/2021    BACTERIA 1+ 05/11/2020    CLARITYU Cloudy 02/12/2021    SPECGRAV 1.025 02/12/2021    LEUKOCYTESUR small 02/12/2021    LEUKOCYTESUR MODERATE 05/11/2020    UROBILINOGEN Normal 02/12/2021    BILIRUBINUR 0 02/12/2021    BLOODU Negative 02/12/2021    GLUCOSEU neg 02/12/2021           IMPRESSION/RECOMMENDATIONS:      1. TORIBIO  Pre Renal: Secondary to congestive heart failure and its treatment. Diuretics are on hold however feel that she will need them. We will check fractional excretion of sodium guide therapy    2. CHF    3. Hypokalemia   Sec to diuretics , potassium has been replaced    4. CAD     5. CKD stage 3a   Baseline Cr 1.3, eGFR 40    check Urine    Ultrasound (2018 )   right kidney measures 11.5 cm in length and the left kidney measures 11.6   cm in length         Thank you for allowing me to participate in the care of this patient. I will continue to follow along. Please call with questions.     Nitin Garcia MD  2/28/2021  [facility]

## 2021-02-28 NOTE — PROGRESS NOTES
Pharmacy to Dose Warfarin    Pharmacy consulted to dose warfarin for Afib. INR Goal: 2-3    INR today: 2.34    Assessment/Plan:  - INR is therapeutic  - Will restart warfarin 5 mg tonight  - Daily INR ordered    Pharmacy will continue to follow.     Thanks,  Jesus Church, PharmD  PGY-1 Pharmacy Resident  [id number]

## 2021-03-01 ENCOUNTER — APPOINTMENT (OUTPATIENT)
Dept: ULTRASOUND IMAGING | Age: 76
DRG: 393 | End: 2021-03-01
Attending: INTERNAL MEDICINE
Payer: MEDICARE

## 2021-03-01 ENCOUNTER — APPOINTMENT (OUTPATIENT)
Dept: GENERAL RADIOLOGY | Age: 76
DRG: 393 | End: 2021-03-01
Attending: INTERNAL MEDICINE
Payer: MEDICARE

## 2021-03-01 LAB
ANION GAP SERPL CALCULATED.3IONS-SCNC: 11 MMOL/L (ref 3–16)
BUN BLDV-MCNC: 51 MG/DL (ref 7–20)
CALCIUM SERPL-MCNC: 8.8 MG/DL (ref 8.3–10.6)
CHLORIDE BLD-SCNC: 94 MMOL/L (ref 99–110)
CO2: 29 MMOL/L (ref 21–32)
CREAT SERPL-MCNC: 1.5 MG/DL (ref 0.6–1.2)
CREATININE URINE: 38 MG/DL (ref 28–259)
GFR AFRICAN AMERICAN: 41
GFR NON-AFRICAN AMERICAN: 34
GLUCOSE BLD-MCNC: 113 MG/DL (ref 70–99)
GLUCOSE BLD-MCNC: 144 MG/DL (ref 70–99)
GLUCOSE BLD-MCNC: 146 MG/DL (ref 70–99)
GLUCOSE BLD-MCNC: 188 MG/DL (ref 70–99)
GLUCOSE BLD-MCNC: 74 MG/DL (ref 70–99)
INR BLD: 1.89 (ref 0.86–1.14)
MAGNESIUM: 2.2 MG/DL (ref 1.8–2.4)
PERFORMED ON: ABNORMAL
PERFORMED ON: NORMAL
POTASSIUM SERPL-SCNC: 2.9 MMOL/L (ref 3.5–5.1)
POTASSIUM SERPL-SCNC: 3.3 MMOL/L (ref 3.5–5.1)
PROCALCITONIN: 0.1 NG/ML (ref 0–0.15)
PROTEIN PROTEIN: 5 MG/DL
PROTHROMBIN TIME: 22.1 SEC (ref 10–13.2)
SODIUM BLD-SCNC: 134 MMOL/L (ref 136–145)
SODIUM URINE: 79 MMOL/L

## 2021-03-01 PROCEDURE — 6370000000 HC RX 637 (ALT 250 FOR IP): Performed by: HOSPITALIST

## 2021-03-01 PROCEDURE — 2580000003 HC RX 258: Performed by: INTERNAL MEDICINE

## 2021-03-01 PROCEDURE — 80048 BASIC METABOLIC PNL TOTAL CA: CPT

## 2021-03-01 PROCEDURE — 74018 RADEX ABDOMEN 1 VIEW: CPT

## 2021-03-01 PROCEDURE — 99233 SBSQ HOSP IP/OBS HIGH 50: CPT | Performed by: NURSE PRACTITIONER

## 2021-03-01 PROCEDURE — 6360000002 HC RX W HCPCS: Performed by: INTERNAL MEDICINE

## 2021-03-01 PROCEDURE — 36415 COLL VENOUS BLD VENIPUNCTURE: CPT

## 2021-03-01 PROCEDURE — 1200000000 HC SEMI PRIVATE

## 2021-03-01 PROCEDURE — 84132 ASSAY OF SERUM POTASSIUM: CPT

## 2021-03-01 PROCEDURE — 84145 PROCALCITONIN (PCT): CPT

## 2021-03-01 PROCEDURE — 6370000000 HC RX 637 (ALT 250 FOR IP): Performed by: NURSE PRACTITIONER

## 2021-03-01 PROCEDURE — 93005 ELECTROCARDIOGRAM TRACING: CPT | Performed by: HOSPITALIST

## 2021-03-01 PROCEDURE — 6370000000 HC RX 637 (ALT 250 FOR IP): Performed by: INTERNAL MEDICINE

## 2021-03-01 PROCEDURE — 85610 PROTHROMBIN TIME: CPT

## 2021-03-01 PROCEDURE — 76770 US EXAM ABDO BACK WALL COMP: CPT

## 2021-03-01 PROCEDURE — 6360000002 HC RX W HCPCS: Performed by: NURSE PRACTITIONER

## 2021-03-01 PROCEDURE — 83735 ASSAY OF MAGNESIUM: CPT

## 2021-03-01 RX ORDER — INSULIN LISPRO 100 [IU]/ML
INJECTION, SOLUTION INTRAVENOUS; SUBCUTANEOUS
Qty: 15 ML | Refills: 7 | OUTPATIENT
Start: 2021-03-01

## 2021-03-01 RX ORDER — POTASSIUM CHLORIDE 7.45 MG/ML
10 INJECTION INTRAVENOUS
Status: COMPLETED | OUTPATIENT
Start: 2021-03-01 | End: 2021-03-02

## 2021-03-01 RX ORDER — DIGOXIN 125 MCG
250 TABLET ORAL DAILY
Status: DISCONTINUED | OUTPATIENT
Start: 2021-03-01 | End: 2021-03-02

## 2021-03-01 RX ORDER — EXENATIDE 250 UG/ML
INJECTION SUBCUTANEOUS
Qty: 7.2 ML | Refills: 3 | OUTPATIENT
Start: 2021-03-01

## 2021-03-01 RX ORDER — TORSEMIDE 20 MG/1
40 TABLET ORAL DAILY
Status: DISCONTINUED | OUTPATIENT
Start: 2021-03-01 | End: 2021-03-02

## 2021-03-01 RX ORDER — WARFARIN SODIUM 5 MG/1
5 TABLET ORAL DAILY
Status: DISCONTINUED | OUTPATIENT
Start: 2021-03-01 | End: 2021-03-02

## 2021-03-01 RX ORDER — POTASSIUM CHLORIDE 20 MEQ/1
20 TABLET, EXTENDED RELEASE ORAL
Status: DISCONTINUED | OUTPATIENT
Start: 2021-03-01 | End: 2021-03-01

## 2021-03-01 RX ADMIN — ONDANSETRON 4 MG: 2 INJECTION INTRAMUSCULAR; INTRAVENOUS at 21:49

## 2021-03-01 RX ADMIN — ONDANSETRON 4 MG: 2 INJECTION INTRAMUSCULAR; INTRAVENOUS at 17:19

## 2021-03-01 RX ADMIN — PROMETHAZINE HYDROCHLORIDE 12.5 MG: 25 TABLET ORAL at 18:44

## 2021-03-01 RX ADMIN — INSULIN LISPRO 2 UNITS: 100 INJECTION, SOLUTION INTRAVENOUS; SUBCUTANEOUS at 12:14

## 2021-03-01 RX ADMIN — POTASSIUM CHLORIDE 10 MEQ: 7.46 INJECTION, SOLUTION INTRAVENOUS at 22:41

## 2021-03-01 RX ADMIN — INSULIN GLARGINE 30 UNITS: 100 INJECTION, SOLUTION SUBCUTANEOUS at 08:37

## 2021-03-01 RX ADMIN — PANTOPRAZOLE SODIUM 40 MG: 40 TABLET, DELAYED RELEASE ORAL at 07:13

## 2021-03-01 RX ADMIN — OXYCODONE 10 MG: 5 TABLET ORAL at 08:30

## 2021-03-01 RX ADMIN — WARFARIN SODIUM 5 MG: 5 TABLET ORAL at 17:22

## 2021-03-01 RX ADMIN — POTASSIUM CHLORIDE 40 MEQ: 1500 TABLET, EXTENDED RELEASE ORAL at 10:30

## 2021-03-01 RX ADMIN — INSULIN LISPRO 2 UNITS: 100 INJECTION, SOLUTION INTRAVENOUS; SUBCUTANEOUS at 17:28

## 2021-03-01 RX ADMIN — GABAPENTIN 300 MG: 300 CAPSULE ORAL at 21:49

## 2021-03-01 RX ADMIN — POTASSIUM BICARBONATE 20 MEQ: 782 TABLET, EFFERVESCENT ORAL at 17:48

## 2021-03-01 RX ADMIN — MONTELUKAST SODIUM 10 MG: 10 TABLET, FILM COATED ORAL at 21:49

## 2021-03-01 RX ADMIN — Medication 10 ML: at 08:30

## 2021-03-01 RX ADMIN — Medication 10 ML: at 20:30

## 2021-03-01 RX ADMIN — POTASSIUM BICARBONATE 40 MEQ: 782 TABLET, EFFERVESCENT ORAL at 08:30

## 2021-03-01 RX ADMIN — TORSEMIDE 40 MG: 20 TABLET ORAL at 13:57

## 2021-03-01 RX ADMIN — DIGOXIN 250 MCG: 125 TABLET ORAL at 13:57

## 2021-03-01 ASSESSMENT — PAIN SCALES - GENERAL
PAINLEVEL_OUTOF10: 0

## 2021-03-01 NOTE — PROGRESS NOTES
[facility]  HEART FAILURE  Progress Note      Admit Date 2/26/2021     Reason for Consult:      Reason for Consultation/Chief Complaint: SOB    HPI:    Dewayne Bush is a 77 y.o. female with PMH HT, HLD, DM, AF s/p Maze 2018, CMP, DVT/ PE, HFrEF admitted from cardiology office with SOB and 13lb wt gain. She has diuresed 3L but has been hypotensive and diuretics held. HR also has been elevated, up to 140s. Subjective:  Patient is being seen for CHF. There were no acute overnight cardiac events. Today Ms. Rosa Ferrera denies chest pain or palpitations, c/o dizziness, continued SOB and edema, back pain with inspiration, still overloaded.        Baseline Weight: 198-201   Wt Readings from Last 3 Encounters:   03/01/21 206 lb 6.4 oz (93.6 kg)   02/26/21 213 lb (96.6 kg)   01/18/21 200 lb (90.7 kg)          Objective:   BP 97/66   Pulse 136   Temp 97.7 °F (36.5 °C) (Oral)   Resp 16   Ht 5' 8\" (1.727 m)   Wt 206 lb 6.4 oz (93.6 kg)   SpO2 92%   BMI 31.38 kg/m²       Intake/Output Summary (Last 24 hours) at 3/1/2021 1000  Last data filed at 3/1/2021 0854  Gross per 24 hour   Intake 720 ml   Output 1450 ml   Net -730 ml      Wt Readings from Last 3 Encounters:   03/01/21 206 lb 6.4 oz (93.6 kg)   02/26/21 213 lb (96.6 kg)   01/18/21 200 lb (90.7 kg)      In: 720 [P.O.:720]  Out: 700     TELEMETRY: ST/AF    Physical Exam:  General Appearance:  Non-obese/Well Nourished  Respiratory:  · Resp Auscultation: Normal breath sounds without dullness  Cardiovascular:  · Auscultation: Regular rate and rhythm, normal S1S2, no m/g/r/c  · Palpation: Normal    · JVD: postivie  · Pedal Pulses: 2+ and equal   Abdomen:  · Soft, NT, ND, + bs  Extremities:  · No Cyanosis or Clubbing  · Extremities: 1-2+ edema pitting  Neurological/Psychiatric:  · Oriented to time, place, and person  · Non-anxious    MEDICATIONS:   Scheduled Meds:   Scheduled Meds:   exenatide  10 mcg Subcutaneous BID WC    sodium chloride flush  10 mL Intravenous 2 times per day    insulin lispro  0-12 Units Subcutaneous TID WC    insulin lispro  0-6 Units Subcutaneous Nightly    gabapentin  300 mg Oral Nightly    insulin glargine  30 Units Subcutaneous QAM    montelukast  10 mg Oral Nightly    pantoprazole  40 mg Oral QAM AC     Continuous Infusions:   dextrose       PRN Meds:.potassium chloride **OR** potassium alternative oral replacement **OR** potassium chloride, sodium chloride flush, promethazine **OR** ondansetron, polyethylene glycol, acetaminophen **OR** acetaminophen, glucose, dextrose, glucagon (rDNA), dextrose, albuterol sulfate HFA, oxyCODONE  Continuous Infusions:   dextrose         Intake/Output Summary (Last 24 hours) at 3/1/2021 1000  Last data filed at 3/1/2021 0854  Gross per 24 hour   Intake 720 ml   Output 1450 ml   Net -730 ml       Lab Data:  CBC:   Lab Results   Component Value Date    WBC 6.2 02/27/2021    HGB 11.9 02/27/2021     02/27/2021     BMP:  Lab Results   Component Value Date     03/01/2021    K 2.9 03/01/2021    K 3.5 02/26/2021    CL 94 03/01/2021    CO2 29 03/01/2021    BUN 51 03/01/2021    CREATININE 1.5 03/01/2021    GLUCOSE 144 03/01/2021     INR:   Lab Results   Component Value Date    INR 1.89 03/01/2021    INR 2.34 02/28/2021    INR 3.43 02/27/2021        CARDIAC LABS  ENZYMES:No results for input(s): CKMB, CKMBINDEX, TROPONINI in the last 72 hours.     Invalid input(s): CKTOTAL;3  FASTING LIPID PANEL:  Lab Results   Component Value Date    HDL 40 12/23/2020    LDLDIRECT 127 08/21/2018    LDLCALC 97 12/23/2020    TRIG 60 12/23/2020    TSH 2.01 10/21/2020     LIVER PROFILE:  Lab Results   Component Value Date    AST 15 02/26/2021    AST 16 12/23/2020    ALT 13 02/26/2021    ALT 15 12/23/2020     BNP:   Lab Results   Component Value Date    PROBNP 5,497 02/26/2021    PROBNP 4,321 12/15/2020    PROBNP 4,640 11/30/2020     Iron Studies:    Lab Results   Component Value Date    FERRITIN 799.9 10/05/2018 FERRITIN 416.5 08/30/2018     Lab Results   Component Value Date    IRON 49 11/30/2020    TIBC 310 11/30/2020    FERRITIN 799.9 (H) 10/05/2018      Iron Deficiency Anemia:  No IV Iron Therapy:  No  2017 ACC/AHA HF Guidelines:   intravenous iron replacement in patients with New York Heart Association (NYHA) class II and III HF and iron deficiency(ferritin <100 ng/ml or 100-300 ng/ml if transferrin saturation <20%), to improve functional status and QoL. 1. WEIGHT: Admit Weight: 211 lb (95.7 kg)      Today  Weight: 206 lb 6.4 oz (93.6 kg)   2. I/O     Intake/Output Summary (Last 24 hours) at 3/1/2021 1000  Last data filed at 3/1/2021 0854  Gross per 24 hour   Intake 720 ml   Output 1450 ml   Net -730 ml       Cardiac Testing:   Dobut ECHO:  11/30/2020   Summary   Dobutamine echocardiogram for aortic stenosis.   Very technically limited exam due to atrial fibrillation.   Baseline echocardiogram shows severe left ventricular dysfunction with   anterior, lateral and apical hypokinesis with an ejection fraction of 20-25   %.   There is no improvement in wall motion with low or high dose dobutamine suggestive of nonviable myocardium.   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 After dobutamine stress the mean AV gradient rises to 20mmHg with 20mcg of dobutamine consistent with mild to moderate aortic stenosis.   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 AHF- overloaded, po torsemide started per nephrology today, recheck BNP tomorrow  2. ICM- no HF meds now due to CKD and hypotension, will start Dig today  3. AF- rate elevated, start Dig  4.  Hypotension - monitor, hopefully will improved as HR comes down        I appreciate the opportunity of cooperating in the care of this individual.    Swathi Martinez, SHEFALIP, BETH 3/1/2021, 10:00 AM  Heart Failure  The [address], [address] [place] [address]  Ph: [phone]      Core Measures:   · Discharge instructions:   · LVEF documented:   · ACEI for LV dysfunction:   · Smoking Cessation:

## 2021-03-01 NOTE — PROGRESS NOTES
Nephrology Progress  Note  [phone]  [phone]   [email or web address].        Reason for Consult: TORIBIO/ CKD / Edema   The patient is a 77 y.o. female with significant past medical history of Coronary  artery disease s/p CABG, congestive heart failure , chronic kidney disease stage IIIa , atrial fibrillation , T2DM  hyperlipidemia , hypertension who presented to the ER with progressive swelling of her legs and ankles for the last week. She says she has not been measuring her daily weights, but she says she has been watching her salt intake and taking her prescribed diuretics. She has an aching pain over her left kidney area however there is no dysuria or gross hematuria. There is no fever, cough hemoptysis  She has no nausea vomiting diarrhea  Prior to admission she was taking diuretics Demadex with metolazone and spironolactone. She is not on NSAIDs or ACE inhibitor or ARB  There is no skin new skin rash or arthralgias    Interval History :  Swelling unchanged, Breathing about the same     PHYSICAL EXAM:    Vitals:    BP 97/66   Pulse 136   Temp 97.7 °F (36.5 °C) (Oral)   Resp 16   Ht 5' 8\" (1.727 m)   Wt 206 lb 6.4 oz (93.6 kg)   SpO2 92%   BMI 31.38 kg/m²   I/O last 3 completed shifts: In: 480 [P.O.:480]  Out: 1250 [Urine:1250]  I/O this shift: In: 480 [P.O.:480]  Out: 200 [Urine:200]    Physical Exam:  Gen:  alert, oriented x 3, dyspnea+  PERRL , EOM +  Pallor +, No icterus  JVP  raised   CV: RR , tachycardia ,  no murmur or rub . Mid sternal scar of CABG   Lungs:B/ L air entry, Normal breath sounds   Abd: soft, bowel sounds + , No organomegaly , scars+  Ext: 1+ leg and ankle  edema, no cyanosis  Skin: Warm.   No rash  Neuro: nonfocal.      DATA:    CBC with Differential:    Lab Results   Component Value Date    WBC 6.2 02/27/2021    RBC 4.30 02/27/2021    HGB 11.9 02/27/2021    HCT 37.4 02/27/2021     02/27/2021    MCV 86.9 02/27/2021    MCH 27.7 02/27/2021    MCHC 31.9 02/27/2021    RDW 16.6 02/27/2021    SEGSPCT 64.1 09/02/2020    BANDSPCT 1 01/14/2019    LYMPHOPCT 20.1 12/23/2020    MONOPCT 11.7 12/23/2020    BASOPCT 0.7 12/23/2020    MONOSABS 0.6 12/23/2020    LYMPHSABS 1.0 12/23/2020    EOSABS 0.4 12/23/2020    BASOSABS 0.0 12/23/2020     CMP:    Lab Results   Component Value Date     03/01/2021    K 2.9 03/01/2021    K 3.5 02/26/2021    CL 94 03/01/2021    CO2 29 03/01/2021    BUN 51 03/01/2021    CREATININE 1.5 03/01/2021    GFRAA 41 03/01/2021    AGRATIO 1.1 02/26/2021    LABGLOM 34 03/01/2021    LABGLOM 45 12/06/2018    GLUCOSE 144 03/01/2021    PROT 5.9 02/26/2021    LABALBU 3.1 02/26/2021    CALCIUM 8.8 03/01/2021    BILITOT 0.4 02/26/2021    ALKPHOS 143 02/26/2021    AST 15 02/26/2021    ALT 13 02/26/2021     Phosphorus:    Lab Results   Component Value Date    PHOS 3.7 05/12/2020     Troponin:    Lab Results   Component Value Date    TROPONINI <0.01 05/10/2020     U/A:    Lab Results   Component Value Date    COLORU YELLOW 02/28/2021    PROTEINU Negative 02/28/2021    PHUR 7.0 02/28/2021    WBCUA 31 05/11/2020    RBCUA neg 02/12/2021    RBCUA 1 05/11/2020    YEAST neg 02/12/2021    BACTERIA trace 02/12/2021    BACTERIA 1+ 05/11/2020    CLARITYU Clear 02/28/2021    SPECGRAV 1.010 02/28/2021    LEUKOCYTESUR Negative 02/28/2021    UROBILINOGEN 1.0 02/28/2021    BILIRUBINUR Negative 02/28/2021    BLOODU Negative 02/28/2021    GLUCOSEU Negative 02/28/2021           IMPRESSION/RECOMMENDATIONS:      1. TORIBIO  : Secondary to congestive heart failure and its treatment. Diuretics are on hold however feel that she will need them. FeNa> 1 , indicates ATN   Cr 1.5      2. CHF  Restart Torsemide     3. Hypokalemia   Sec to diuretics , K still 2.9   Increase K replacement     4. CAD     5.  CKD stage 3a   Baseline Cr 1.3, eGFR 40 , No proteinuria    check Urine    Ultrasound (2018 )   right kidney measures 11.5 cm in length and the left kidney measures 11.6   cm in length      will recheck Renal Ultrasound     Thank you for allowing me to participate in the care of this patient. I will continue to follow along. Please call with questions.     Pham Abbasi MD  3/1/2021  [facility]

## 2021-03-01 NOTE — PROGRESS NOTES
[facility]    3/1/2021 7:27 AM        Name: Kita Gibbons . Admitted: 2/26/2021  Primary Care Provider: Jarrell Case MD (Tel: [phone])                        Subjective:  . No acute events overnight. Resting well. Pain control. Diet ok. Labs reviewed  Denies any chest pain sob.      Reviewed interval ancillary notes    Current Medications      potassium chloride (KLOR-CON M) extended release tablet 40 mEq, PRN    Or      potassium bicarb-citric acid (EFFER-K) effervescent tablet 40 mEq, PRN    Or      potassium chloride 10 mEq/100 mL IVPB (Peripheral Line), PRN      exenatide (BYETTA) injection 10 mcg -- PATIENT SUPPLIED, BID WC      sodium chloride flush 0.9 % injection 10 mL, 2 times per day      sodium chloride flush 0.9 % injection 10 mL, PRN      promethazine (PHENERGAN) tablet 12.5 mg, Q6H PRN    Or      ondansetron (ZOFRAN) injection 4 mg, Q6H PRN      polyethylene glycol (GLYCOLAX) packet 17 g, Daily PRN      acetaminophen (TYLENOL) tablet 650 mg, Q6H PRN    Or      acetaminophen (TYLENOL) suppository 650 mg, Q6H PRN      insulin lispro (1 Unit Dial) 0-12 Units, TID WC      insulin lispro (1 Unit Dial) 0-6 Units, Nightly      glucose (GLUTOSE) 40 % oral gel 15 g, PRN      dextrose 50 % IV solution, PRN      glucagon (rDNA) injection 1 mg, PRN      dextrose 5 % solution, PRN      albuterol sulfate  (90 Base) MCG/ACT inhaler 2 puff, Q6H PRN      gabapentin (NEURONTIN) capsule 300 mg, Nightly      insulin glargine (LANTUS;BASAGLAR) injection pen 30 Units, QAM      montelukast (SINGULAIR) tablet 10 mg, Nightly      pantoprazole (PROTONIX) tablet 40 mg, QAM AC      oxyCODONE (ROXICODONE) immediate release tablet 10 mg, Q3H PRN        Objective:  /72   Pulse 110   Temp 97.8 °F (36.6 °C) (Oral)   Resp 17 Ht 5' 8\" (1.727 m)   Wt 206 lb 6.4 oz (93.6 kg)   SpO2 94%   BMI 31.38 kg/m²     Intake/Output Summary (Last 24 hours) at 3/1/2021 0727  Last data filed at 2/28/2021 2137  Gross per 24 hour   Intake 480 ml   Output 1250 ml   Net -770 ml      Wt Readings from Last 3 Encounters:   03/01/21 206 lb 6.4 oz (93.6 kg)   02/26/21 213 lb (96.6 kg)   01/18/21 200 lb (90.7 kg)       General appearance:  Appears comfortable  Eyes: Sclera clear. Pupils equal.  ENT: Moist oral mucosa. Trachea midline, no adenopathy. Cardiovascular: Regular rhythm, normal S1, S2. No murmur. No edema in lower extremities  Respiratory: Not using accessory muscles. Good inspiratory effort. Clear to auscultation bilaterally, no wheeze or crackles. GI: Abdomen soft, no tenderness, not distended, normal bowel sounds  Musculoskeletal: No cyanosis in digits, neck supple  Neurology: CN 2-12 grossly intact. No speech or motor deficits  Psych: Normal affect.  Alert and oriented in time, place and person  Skin: Warm, dry, normal turgor    Labs and Tests:  CBC:   Recent Labs     02/26/21  1511 02/27/21  0546   WBC 8.0 6.2   HGB 12.1 11.9*    207     BMP:    Recent Labs     02/27/21  0546 02/27/21  1722 02/28/21  0609 03/01/21  0546     --  136 134*   K 3.0* 3.3* 3.1* 2.9*   CL 96*  --  96* 94*   CO2 29  --  30 29   BUN 44*  --  54* 51*   CREATININE 1.6*  --  1.9* 1.5*   GLUCOSE 79  --  120* 144*     Hepatic:   Recent Labs     02/26/21  1511   AST 15   ALT 13   BILITOT 0.4   ALKPHOS 143*       Discussed care with family and patient             Spent 30  minutes with patient and family at bedside and on unit reviewing medical records and labs, spent greater than 50% time counseling patient and family on diagnosis and plan   Problem List  Active Problems:    CHF (congestive heart failure), NYHA class I, acute on chronic, combined ([address]    Stage 3 chronic kidney disease    Coronary artery disease involving native heart without angina pectoris  Resolved Problems:    * No resolved hospital problems. *       Assessment & Plan:   58-year-old female who was admitted for weight gain of 13 pounds found to have acute on chronic CHF exacerbation. Patient has been diuresing with IV Lasix here. Found to have bump in creatinine with renal failure with creatinine of 1.5 cardiology and nephrology has been following. 1. Acute on chronic systolic CHF  -on iv lasix  -Patient is that been diuresing well.  -Weight is down but still not at baseline.  -We will continue diuresis per cardiology and nephrology recommended      Hypokalemia  -Systole low while on diuresis   -Continue to replace aggressively. Acute on chronic renal failure  -And is markedly improved to 1.5 from 1.9  -Further recommended patient nephrology    Chronic A.  Fib  Continue warfarin  Pharmacy dosing      Hypokalemia replaced      Diet: DIET LOW SODIUM 2 GM;  Code:Full Code  DVT PPX lovenox  Disposition inpatient 1 to 2 days pending improvement      Toño Turcios MD   3/1/2021 7:27 AM

## 2021-03-01 NOTE — PROGRESS NOTES
Pharmacy to Dose Warfarin    Pharmacy consulted to dose warfarin for Afib. INR Goal: 2-3    INR today: 1.89    Assessment/Plan:  - INR subtherapeutic today  - Will give 5 mg again tonight  - Daily INR ordered    Pharmacy will continue to follow.     Tati Mello, PharmD, BCPS  Clinical Pharmacist  [id number]

## 2021-03-01 NOTE — PLAN OF CARE
Problem: OXYGENATION/RESPIRATORY FUNCTION  Goal: Patient will maintain patent airway  Outcome: Ongoing   02 sat 95% on RA  Problem: HEMODYNAMIC STATUS  Goal: Patient has stable vital signs and fluid balance  Outcome: Ongoing   BP stable and monitored q4 hrs  Problem: FLUID AND ELECTROLYTE IMBALANCE  Goal: Fluid and electrolyte balance are achieved/maintained  Outcome: Ongoing  Torsemide restarted and I/O monitored.    Problem: Pain:  Goal: Pain level will decrease  Description: Pain level will decrease  Outcome: Ongoing  PRN pain mediation x1

## 2021-03-02 ENCOUNTER — APPOINTMENT (OUTPATIENT)
Dept: CT IMAGING | Age: 76
DRG: 393 | End: 2021-03-02
Attending: INTERNAL MEDICINE
Payer: MEDICARE

## 2021-03-02 ENCOUNTER — APPOINTMENT (OUTPATIENT)
Dept: INTERVENTIONAL RADIOLOGY/VASCULAR | Age: 76
DRG: 393 | End: 2021-03-02
Attending: INTERNAL MEDICINE
Payer: MEDICARE

## 2021-03-02 ENCOUNTER — APPOINTMENT (OUTPATIENT)
Dept: GENERAL RADIOLOGY | Age: 76
DRG: 393 | End: 2021-03-02
Attending: INTERNAL MEDICINE
Payer: MEDICARE

## 2021-03-02 LAB
A/G RATIO: 1 (ref 1.1–2.2)
ALBUMIN SERPL-MCNC: 2.9 G/DL (ref 3.4–5)
ALP BLD-CCNC: 155 U/L (ref 40–129)
ALT SERPL-CCNC: 15 U/L (ref 10–40)
ANION GAP SERPL CALCULATED.3IONS-SCNC: 9 MMOL/L (ref 3–16)
AST SERPL-CCNC: 18 U/L (ref 15–37)
BASOPHILS ABSOLUTE: 0 K/UL (ref 0–0.2)
BASOPHILS RELATIVE PERCENT: 0.5 %
BILIRUB SERPL-MCNC: 0.9 MG/DL (ref 0–1)
BUN BLDV-MCNC: 50 MG/DL (ref 7–20)
CALCIUM SERPL-MCNC: 8.4 MG/DL (ref 8.3–10.6)
CHLORIDE BLD-SCNC: 94 MMOL/L (ref 99–110)
CO2: 33 MMOL/L (ref 21–32)
CREAT SERPL-MCNC: 1.4 MG/DL (ref 0.6–1.2)
EKG ATRIAL RATE: 136 BPM
EKG DIAGNOSIS: NORMAL
EKG Q-T INTERVAL: 338 MS
EKG QRS DURATION: 90 MS
EKG QTC CALCULATION (BAZETT): 481 MS
EKG R AXIS: 45 DEGREES
EKG T AXIS: 156 DEGREES
EKG VENTRICULAR RATE: 122 BPM
EOSINOPHILS ABSOLUTE: 0.2 K/UL (ref 0–0.6)
EOSINOPHILS RELATIVE PERCENT: 3.3 %
GFR AFRICAN AMERICAN: 44
GFR NON-AFRICAN AMERICAN: 37
GLOBULIN: 2.8 G/DL
GLUCOSE BLD-MCNC: 100 MG/DL (ref 70–99)
GLUCOSE BLD-MCNC: 117 MG/DL (ref 70–99)
GLUCOSE BLD-MCNC: 121 MG/DL (ref 70–99)
GLUCOSE BLD-MCNC: 72 MG/DL (ref 70–99)
GLUCOSE BLD-MCNC: 81 MG/DL (ref 70–99)
GLUCOSE BLD-MCNC: 99 MG/DL (ref 70–99)
HCT VFR BLD CALC: 37.7 % (ref 36–48)
HEMOGLOBIN: 12 G/DL (ref 12–16)
INR BLD: 1.81 (ref 0.86–1.14)
LACTIC ACID: 1.1 MMOL/L (ref 0.4–2)
LYMPHOCYTES ABSOLUTE: 0.9 K/UL (ref 1–5.1)
LYMPHOCYTES RELATIVE PERCENT: 16.4 %
MAGNESIUM: 2.2 MG/DL (ref 1.8–2.4)
MCH RBC QN AUTO: 27.5 PG (ref 26–34)
MCHC RBC AUTO-ENTMCNC: 31.9 G/DL (ref 31–36)
MCV RBC AUTO: 86.1 FL (ref 80–100)
MONOCYTES ABSOLUTE: 0.9 K/UL (ref 0–1.3)
MONOCYTES RELATIVE PERCENT: 16.4 %
NEUTROPHILS ABSOLUTE: 3.6 K/UL (ref 1.7–7.7)
NEUTROPHILS RELATIVE PERCENT: 63.4 %
PDW BLD-RTO: 17.1 % (ref 12.4–15.4)
PERFORMED ON: ABNORMAL
PERFORMED ON: ABNORMAL
PERFORMED ON: NORMAL
PLATELET # BLD: 181 K/UL (ref 135–450)
PMV BLD AUTO: 7.9 FL (ref 5–10.5)
POTASSIUM REFLEX MAGNESIUM: 3.1 MMOL/L (ref 3.5–5.1)
POTASSIUM SERPL-SCNC: 3.2 MMOL/L (ref 3.5–5.1)
PRO-BNP: 7878 PG/ML (ref 0–449)
PROTHROMBIN TIME: 21.1 SEC (ref 10–13.2)
RBC # BLD: 4.37 M/UL (ref 4–5.2)
SODIUM BLD-SCNC: 136 MMOL/L (ref 136–145)
TOTAL PROTEIN: 5.7 G/DL (ref 6.4–8.2)
WBC # BLD: 5.7 K/UL (ref 4–11)

## 2021-03-02 PROCEDURE — APPSS60 APP SPLIT SHARED TIME 46-60 MINUTES: Performed by: NURSE PRACTITIONER

## 2021-03-02 PROCEDURE — 93010 ELECTROCARDIOGRAM REPORT: CPT | Performed by: INTERNAL MEDICINE

## 2021-03-02 PROCEDURE — 36569 INSJ PICC 5 YR+ W/O IMAGING: CPT

## 2021-03-02 PROCEDURE — 74176 CT ABD & PELVIS W/O CONTRAST: CPT

## 2021-03-02 PROCEDURE — 83605 ASSAY OF LACTIC ACID: CPT

## 2021-03-02 PROCEDURE — 36415 COLL VENOUS BLD VENIPUNCTURE: CPT

## 2021-03-02 PROCEDURE — 6360000002 HC RX W HCPCS: Performed by: INTERNAL MEDICINE

## 2021-03-02 PROCEDURE — 80053 COMPREHEN METABOLIC PANEL: CPT

## 2021-03-02 PROCEDURE — 02HV33Z INSERTION OF INFUSION DEVICE INTO SUPERIOR VENA CAVA, PERCUTANEOUS APPROACH: ICD-10-PCS | Performed by: INTERNAL MEDICINE

## 2021-03-02 PROCEDURE — 99222 1ST HOSP IP/OBS MODERATE 55: CPT | Performed by: SURGERY

## 2021-03-02 PROCEDURE — C1751 CATH, INF, PER/CENT/MIDLINE: HCPCS

## 2021-03-02 PROCEDURE — 74018 RADEX ABDOMEN 1 VIEW: CPT

## 2021-03-02 PROCEDURE — 85610 PROTHROMBIN TIME: CPT

## 2021-03-02 PROCEDURE — 2500000003 HC RX 250 WO HCPCS: Performed by: SURGERY

## 2021-03-02 PROCEDURE — 2580000003 HC RX 258: Performed by: INTERNAL MEDICINE

## 2021-03-02 PROCEDURE — 83880 ASSAY OF NATRIURETIC PEPTIDE: CPT

## 2021-03-02 PROCEDURE — 71045 X-RAY EXAM CHEST 1 VIEW: CPT

## 2021-03-02 PROCEDURE — 6360000002 HC RX W HCPCS: Performed by: NURSE PRACTITIONER

## 2021-03-02 PROCEDURE — 2580000003 HC RX 258: Performed by: NURSE PRACTITIONER

## 2021-03-02 PROCEDURE — 6370000000 HC RX 637 (ALT 250 FOR IP): Performed by: NURSE PRACTITIONER

## 2021-03-02 PROCEDURE — 2500000003 HC RX 250 WO HCPCS: Performed by: NURSE PRACTITIONER

## 2021-03-02 PROCEDURE — 6370000000 HC RX 637 (ALT 250 FOR IP): Performed by: INTERNAL MEDICINE

## 2021-03-02 PROCEDURE — 6360000002 HC RX W HCPCS: Performed by: SURGERY

## 2021-03-02 PROCEDURE — 1200000000 HC SEMI PRIVATE

## 2021-03-02 PROCEDURE — 84132 ASSAY OF SERUM POTASSIUM: CPT

## 2021-03-02 PROCEDURE — 85025 COMPLETE CBC W/AUTO DIFF WBC: CPT

## 2021-03-02 PROCEDURE — 83735 ASSAY OF MAGNESIUM: CPT

## 2021-03-02 PROCEDURE — 99232 SBSQ HOSP IP/OBS MODERATE 35: CPT | Performed by: NURSE PRACTITIONER

## 2021-03-02 PROCEDURE — APPNB30 APP NON BILLABLE TIME 0-30 MINS: Performed by: NURSE PRACTITIONER

## 2021-03-02 RX ORDER — OXYMETAZOLINE HYDROCHLORIDE 0.05 G/100ML
2 SPRAY NASAL ONCE
Status: COMPLETED | OUTPATIENT
Start: 2021-03-02 | End: 2021-03-02

## 2021-03-02 RX ORDER — SODIUM CHLORIDE 0.9 % (FLUSH) 0.9 %
10 SYRINGE (ML) INJECTION EVERY 12 HOURS SCHEDULED
Status: DISCONTINUED | OUTPATIENT
Start: 2021-03-02 | End: 2021-03-10

## 2021-03-02 RX ORDER — BISACODYL 10 MG
10 SUPPOSITORY, RECTAL RECTAL DAILY PRN
Status: DISCONTINUED | OUTPATIENT
Start: 2021-03-02 | End: 2021-03-10 | Stop reason: HOSPADM

## 2021-03-02 RX ORDER — PANTOPRAZOLE SODIUM 40 MG/10ML
40 INJECTION, POWDER, LYOPHILIZED, FOR SOLUTION INTRAVENOUS DAILY
Status: DISCONTINUED | OUTPATIENT
Start: 2021-03-03 | End: 2021-03-04

## 2021-03-02 RX ORDER — WARFARIN SODIUM 5 MG/1
5 TABLET ORAL DAILY
Status: DISCONTINUED | OUTPATIENT
Start: 2021-03-03 | End: 2021-03-02

## 2021-03-02 RX ORDER — LIDOCAINE HYDROCHLORIDE 10 MG/ML
5 INJECTION, SOLUTION EPIDURAL; INFILTRATION; INTRACAUDAL; PERINEURAL ONCE
Status: COMPLETED | OUTPATIENT
Start: 2021-03-02 | End: 2021-03-02

## 2021-03-02 RX ORDER — POTASSIUM CHLORIDE 29.8 MG/ML
20 INJECTION INTRAVENOUS PRN
Status: DISCONTINUED | OUTPATIENT
Start: 2021-03-02 | End: 2021-03-10 | Stop reason: HOSPADM

## 2021-03-02 RX ORDER — LIDOCAINE HYDROCHLORIDE 20 MG/ML
JELLY TOPICAL ONCE
Status: COMPLETED | OUTPATIENT
Start: 2021-03-02 | End: 2021-03-02

## 2021-03-02 RX ORDER — INSULIN LISPRO 100 [IU]/ML
0-12 INJECTION, SOLUTION INTRAVENOUS; SUBCUTANEOUS EVERY 4 HOURS
Status: DISCONTINUED | OUTPATIENT
Start: 2021-03-02 | End: 2021-03-04 | Stop reason: DRUGHIGH

## 2021-03-02 RX ORDER — SODIUM CHLORIDE 0.9 % (FLUSH) 0.9 %
10 SYRINGE (ML) INJECTION PRN
Status: DISCONTINUED | OUTPATIENT
Start: 2021-03-02 | End: 2021-03-10 | Stop reason: HOSPADM

## 2021-03-02 RX ORDER — INSULIN LISPRO 100 [IU]/ML
0-12 INJECTION, SOLUTION INTRAVENOUS; SUBCUTANEOUS EVERY 4 HOURS
Status: DISCONTINUED | OUTPATIENT
Start: 2021-03-02 | End: 2021-03-02

## 2021-03-02 RX ORDER — PROMETHAZINE HYDROCHLORIDE 25 MG/ML
12.5 INJECTION, SOLUTION INTRAMUSCULAR; INTRAVENOUS EVERY 6 HOURS PRN
Status: DISCONTINUED | OUTPATIENT
Start: 2021-03-02 | End: 2021-03-10 | Stop reason: HOSPADM

## 2021-03-02 RX ORDER — DIGOXIN 125 MCG
125 TABLET ORAL DAILY
Status: DISCONTINUED | OUTPATIENT
Start: 2021-03-03 | End: 2021-03-03

## 2021-03-02 RX ORDER — WARFARIN SODIUM 7.5 MG/1
7.5 TABLET ORAL
Status: DISCONTINUED | OUTPATIENT
Start: 2021-03-02 | End: 2021-03-02

## 2021-03-02 RX ORDER — MORPHINE SULFATE 2 MG/ML
2 INJECTION, SOLUTION INTRAMUSCULAR; INTRAVENOUS EVERY 4 HOURS PRN
Status: COMPLETED | OUTPATIENT
Start: 2021-03-02 | End: 2021-03-02

## 2021-03-02 RX ORDER — POTASSIUM CHLORIDE 20 MEQ/1
20 TABLET, EXTENDED RELEASE ORAL
Status: DISCONTINUED | OUTPATIENT
Start: 2021-03-02 | End: 2021-03-02

## 2021-03-02 RX ORDER — POTASSIUM CHLORIDE 7.45 MG/ML
20 INJECTION INTRAVENOUS ONCE
Status: DISCONTINUED | OUTPATIENT
Start: 2021-03-02 | End: 2021-03-02

## 2021-03-02 RX ORDER — POTASSIUM CHLORIDE 29.8 MG/ML
20 INJECTION INTRAVENOUS
Status: COMPLETED | OUTPATIENT
Start: 2021-03-03 | End: 2021-03-03

## 2021-03-02 RX ORDER — MORPHINE SULFATE 2 MG/ML
2 INJECTION, SOLUTION INTRAMUSCULAR; INTRAVENOUS EVERY 4 HOURS PRN
Status: DISCONTINUED | OUTPATIENT
Start: 2021-03-02 | End: 2021-03-10 | Stop reason: HOSPADM

## 2021-03-02 RX ADMIN — PROMETHAZINE HYDROCHLORIDE 12.5 MG: 25 INJECTION INTRAMUSCULAR; INTRAVENOUS at 19:03

## 2021-03-02 RX ADMIN — PANTOPRAZOLE SODIUM 40 MG: 40 TABLET, DELAYED RELEASE ORAL at 05:52

## 2021-03-02 RX ADMIN — LEUCINE, PHENYLALANINE, LYSINE, METHIONINE, ISOLEUCINE, VALINE, HISTIDINE, THREONINE, TRYPTOPHAN, ALANINE, GLYCINE, ARGININE, PROLINE, SERINE, TYROSINE, SODIUM ACETATE, DIBASIC POTASSIUM PHOSPHATE, MAGNESIUM CHLORIDE, SODIUM CHLORIDE, CALCIUM CHLORIDE, DEXTROSE
365; 280; 290; 200; 300; 290; 240; 210; 90; 1035; 515; 575; 340; 250; 20; 340; 261; 51; 59; 33; 20 INJECTION INTRAVENOUS at 18:45

## 2021-03-02 RX ADMIN — PIPERACILLIN AND TAZOBACTAM 3375 MG: 3; .375 INJECTION, POWDER, LYOPHILIZED, FOR SOLUTION INTRAVENOUS at 18:41

## 2021-03-02 RX ADMIN — MORPHINE SULFATE 2 MG: 2 INJECTION, SOLUTION INTRAMUSCULAR; INTRAVENOUS at 15:49

## 2021-03-02 RX ADMIN — POTASSIUM CHLORIDE 10 MEQ: 7.46 INJECTION, SOLUTION INTRAVENOUS at 00:00

## 2021-03-02 RX ADMIN — NASAL DECONGESTANT 2 SPRAY: 0.05 SPRAY NASAL at 13:09

## 2021-03-02 RX ADMIN — DEXTROSE MONOHYDRATE 100 ML/HR: 50 INJECTION, SOLUTION INTRAVENOUS at 15:19

## 2021-03-02 RX ADMIN — MORPHINE SULFATE 2 MG: 2 INJECTION, SOLUTION INTRAMUSCULAR; INTRAVENOUS at 23:17

## 2021-03-02 RX ADMIN — LIDOCAINE HYDROCHLORIDE 5 ML: 10 INJECTION, SOLUTION EPIDURAL; INFILTRATION; INTRACAUDAL; PERINEURAL at 10:00

## 2021-03-02 RX ADMIN — Medication 1 SPRAY: at 13:55

## 2021-03-02 RX ADMIN — POTASSIUM CHLORIDE 20 MEQ: 400 INJECTION, SOLUTION INTRAVENOUS at 17:43

## 2021-03-02 RX ADMIN — Medication 10 ML: at 14:56

## 2021-03-02 RX ADMIN — Medication 10 ML: at 14:57

## 2021-03-02 RX ADMIN — INSULIN GLARGINE 30 UNITS: 100 INJECTION, SOLUTION SUBCUTANEOUS at 09:37

## 2021-03-02 RX ADMIN — POTASSIUM CHLORIDE 20 MEQ: 400 INJECTION, SOLUTION INTRAVENOUS at 15:49

## 2021-03-02 RX ADMIN — LIDOCAINE HYDROCHLORIDE: 20 JELLY TOPICAL at 13:09

## 2021-03-02 ASSESSMENT — PAIN SCALES - GENERAL
PAINLEVEL_OUTOF10: 10
PAINLEVEL_OUTOF10: 0
PAINLEVEL_OUTOF10: 0
PAINLEVEL_OUTOF10: 7

## 2021-03-02 ASSESSMENT — PAIN DESCRIPTION - DESCRIPTORS: DESCRIPTORS: SORE

## 2021-03-02 NOTE — PROGRESS NOTES
[facility]  HEART FAILURE  Progress Note      Admit Date 2/26/2021     Reason for Consult:      Reason for Consultation/Chief Complaint: SOB    HPI:    Adelaida Dunbar is a 77 y.o. female with PMH HT, HLD, DM, AF s/p Maze 2018, CMP, DVT/ PE, HFrEF admitted from cardiology office with SOB and 13lb wt gain. She has diuresed 4L but has been hypotensive and diuretics held. HR elevated and Dig started yesterday with some improvement. Pt with nausea and vomiting, abd CT done this am and plan for PICC and TPN per pt. Subjective:  Patient is being seen for CHF. There were no acute overnight cardiac events. Today Ms. Angela Melton denies chest pain or palpitations, states some improvement with SOB and edema today.       Baseline Weight: 198-201   Wt Readings from Last 3 Encounters:   03/02/21 204 lb 3.2 oz (92.6 kg)   02/26/21 213 lb (96.6 kg)   01/18/21 200 lb (90.7 kg)          Objective:   BP (!) 98/57   Pulse 87   Temp 97.4 °F (36.3 °C) (Oral)   Resp 18   Ht 5' 8\" (1.727 m)   Wt 204 lb 3.2 oz (92.6 kg)   SpO2 94%   BMI 31.05 kg/m²       Intake/Output Summary (Last 24 hours) at 3/2/2021 8794  Last data filed at 3/2/2021 0554  Gross per 24 hour   Intake 480 ml   Output 1425 ml   Net -945 ml      Wt Readings from Last 3 Encounters:   03/02/21 204 lb 3.2 oz (92.6 kg)   02/26/21 213 lb (96.6 kg)   01/18/21 200 lb (90.7 kg)      In: -   Out: 6807     TELEMETRY: SR/AF    Physical Exam:  General Appearance:  Non-obese/Well Nourished  Respiratory:  · Resp Auscultation: Normal breath sounds without dullness  Cardiovascular:  · Auscultation: irregular, tachy, S1S2, +murmur  · Palpation: Normal    · Pedal Pulses: 2+ and equal   Abdomen:  · Soft, NT, ND, + bs  Extremities:  · No Cyanosis or Clubbing  · Extremities: trace pitting  Neurological/Psychiatric:  · Oriented to time, place, and person  · Non-anxious    MEDICATIONS:   Scheduled Meds:   Scheduled Meds:   potassium chloride  20 mEq Oral TID WC    Value Date    AST 18 03/02/2021    AST 15 02/26/2021    ALT 15 03/02/2021    ALT 13 02/26/2021     BNP:   Lab Results   Component Value Date    PROBNP 8,971 03/02/2021    PROBNP 5,497 02/26/2021    PROBNP 4,321 12/15/2020     Iron Studies:    Lab Results   Component Value Date    FERRITIN 799.9 10/05/2018    FERRITIN 416.5 08/30/2018     Lab Results   Component Value Date    IRON 49 11/30/2020    TIBC 310 11/30/2020    FERRITIN 799.9 (H) 10/05/2018      Iron Deficiency Anemia:  No    IV Iron Therapy:  No  2017 ACC/AHA HF Guidelines:   intravenous iron replacement in patients with New York Heart Association (NYHA) class II and III HF and iron deficiency(ferritin <100 ng/ml or 100-300 ng/ml if transferrin saturation <20%), to improve functional status and QoL. 1. WEIGHT: Admit Weight: 211 lb (95.7 kg)      Today  Weight: 204 lb 3.2 oz (92.6 kg)   2. I/O     Intake/Output Summary (Last 24 hours) at 3/2/2021 8467  Last data filed at 3/2/2021 0554  Gross per 24 hour   Intake 480 ml   Output 1425 ml   Net -945 ml       Cardiac Testing:   Dobut ECHO:  11/30/2020   Summary   Dobutamine echocardiogram for aortic stenosis.   Very technically limited exam due to atrial fibrillation.   Baseline echocardiogram shows severe left ventricular dysfunction with   anterior, lateral and apical hypokinesis with an ejection fraction of 20-25   %.   There is no improvement in wall motion with low or high dose dobutamine suggestive of nonviable myocardium.   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 After dobutamine stress the mean AV gradient rises to 20mmHg with 20mcg of dobutamine consistent with mild to moderate aortic stenosis.   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 AHF- now NPO so will need IV diuretics if ok with nephrology  2. ICM- no HF meds now due to CKD and hypotension, Dig started  3.  AF- rate with some improvement today, continue Dig  4.  Hypotension - stable        I appreciate the opportunity of cooperating in the care of this individual.    YARITZA Hinds, BETH 3/2/2021, 9:07 AM  Heart Failure  The [address], [address]  Ph: [phone]      Core Measures:   · Discharge instructions:   · LVEF documented:   · ACEI for LV dysfunction:   · Smoking Cessation:

## 2021-03-02 NOTE — PLAN OF CARE
[facility]        Assessment & Plan of Care    History of Present Illness: Ms. Jose David Ace is a 77 y.o. female who presented to the hospital on 2021 at the recommendation of her cardiologist for CHF exacerbation. She developed abdominal pain yesterday evening that she described as dull and constant in the left upper quadrant, radiating into the back, and alternates between moderate and severe intensity. No alleviating factors; worse with movement. Associated nausea, vomiting, and bloating. She denies anorexia, hematemesis, bloody stools, diarrhea, constipation, or chest pain. She has SOB on exertion at baseline. Last stool was 2 days ago, last flatus yesterday. CT of the abdomen/pelvis was done last night for the above symptoms and revealed pneumoperitoneum with multiple loculations, related to rupture of small bowel pneumatosis. Prior abdominal surgeries include a cholecystectomy, hysterectomy, and  section.  Last colonoscopy in 2020 revealed diverticulitis of large intestine and hemorrhoids. Medical history includes chronic kidney disease, CAD status-post CABG, CHF, and atrial fibrillation for which she takes Coumadin. She has also been evaluated for pneumatosis in the past for which she has recovered from nonoperatively, we last evaluated the patient for this in 2020.  Nonsmoker.     Physical Exam:  CONSTITUTIONAL:  alert, no apparent distress and mildly obese  NEUROLOGIC:  Mental Status Exam:  Level of Alertness:   awake  Orientation:   person, place, time  EYES:  sclera clear  ENT:  normocepalic, without obvious abnormality  NECK:  supple, symmetrical, trachea midline  LUNGS:  clear to auscultation  CARDIOVASCULAR:  regular rate and rhythm and no murmur noted  ABDOMEN: soft, distended, tenderness noted in the left upper quadrant, voluntary guarding absent, no masses palpated, hypoactive bowel sounds, and hernia absent  SKIN:  no bruising or bleeding and normal skin color, texture, turgor    Assessment:  Pneumoperitoneum with multiple loculations, related to rupture of small bowel pneumatosis  Acute on chronic CHF  CAD, status-post CABG  Acute on chronic kidney disease  Atrial fibrillation, on Coumadin    Plan:  1. Patient reports feeling a little better this morning, no plans for surgical intervention at this time, continue with conservative management and close monitoring, will likely repeat CT imaging in a couple of days  2. NPO with NGT decompression; monitor bowel function  3. IV hydration, place PICC and start TPN; monitor and correct electrolytes--Nephrology following  4. Antibiotics  5. Activity as tolerated  6. PRN analgesics and antiemetics--minimizing narcotics as tolerated  7. DVT prophylaxis with SCD's; hold Coumadin, okay to bridge with heparin  8. Management of medical comorbid etiologies per primary team and consulting services    EDUCATION:  Educated patient on plan of care and disease process--all questions answered. Plans discussed with patient and nursing. Reviewed and discussed with Dr. Eve Garduno consult to follow.       Signed:  SOLEDAD Santos CNP  3/2/2021 8:19 AM

## 2021-03-02 NOTE — CONSULTS
Comprehensive Nutrition Assessment    Type and Reason for Visit:  Initial, Consult    Nutrition Recommendations/Plan:   1. Recommend check TG prior to start of lipids. 2. Bag 1: Clinimix 5/20 starting at 40 mL/hr. 3. Goal: Clinimix 5/20 rate of 80 mL/hr. Nutrition Assessment:  Pt initially admitted with CHF. Reports a 13lb wt gain in 4 days. Provided pt with CHF diet education on 2/27/21. Pt was eating well prior to admission and for first 2 days of admission. Then developed n/v; abd XR and CT abd obtained. Pt with hx of sigmoid colon pneumatosis. Today is day 3 of admission and consult received for PN recommendations. Order for PICC to be placed for PN and NG to be placed for intermittent suction. Malnutrition Assessment:  Malnutrition Status:  No malnutrition        Estimated Daily Nutrient Needs:  Energy (kcal):  1395 - 1674; Weight Used for Energy Requirements:  Current(93kg)     Protein (g):  77 - 128; Weight Used for Protein Requirements:  (1.2 - 2.0g/64kg)        Fluid (ml/day):     1 ml/kcal      Nutrition Related Findings:  +2 pitting BLE edema; K+ 3.1; abd rounded, rotund, distended        Current Nutrition Therapies:    Diet NPO Effective Now  PN-Adult Premix 5/20 - Standard Electrolytes - Central Line  Parenteral Nutrition Recs:  · Goal PN Orders Provides: Clinimix 2/50 @ 80mL/hr provides 1920mL total volume, 1690kcal, 96g protein, dextrose load 2.9mg/kg/min      Anthropometric Measures:  · Height: 5' 8\" (172.7 cm)  · Current Body Weight: 204 lb (92.5 kg)   · Admission Body Weight: 209 lb (94.8 kg)    · Ideal Body Weight: 140 lbs; % Ideal Body Weight 145.7 %   · BMI: 31  · BMI Categories: Obese Class 1 (BMI 30.0-34. 9)       Nutrition Diagnosis:   · Inadequate oral intake related to altered GI function as evidenced by NPO or clear liquid status due to medical condition      Nutrition Interventions:   Food and/or Nutrient Delivery:  Start Parenteral Nutrititon  Nutrition Education/Counseling:  Education completed(2/27)   Coordination of Nutrition Care:  Continue to monitor while inpatient    Goals:  PN to goal       Nutrition Monitoring and Evaluation:   Behavioral-Environmental Outcomes:  None Identified   Food/Nutrient Intake Outcomes:  Parenteral Nutrition Intake/Tolerance  Physical Signs/Symptoms Outcomes:  Biochemical Data, Weight, Fluid Status or Edema     Discharge Planning:     Too soon to determine     Electronically signed by Carlos Morelos RD, LD on 3/2/21 at 2:57 PM EST  Contact: [phone]

## 2021-03-02 NOTE — ACP (ADVANCE CARE PLANNING)
Advanced Care Planning Note.     Purpose of Encounter: Advanced care planning in light of hospitalization  Parties In Attendance: Patient,    Decisional Capacity: Yes  Subjective: Patient  understand that this conversation is to address long term care goal  Objective: cr 1.4  Goals of Care Determination: Patient would pursue CPR intubation PEG tube and dialysis if required no tracheostomy or long-term ventilation  Code Status: full code  Time spent on Advanced care Plannin minutes  Advanced Care Planning Documents: documented patient's wishes, would like  [place] Krysten to make medication decisions if unable to make decisions    Luis M Peace MD  3/2/2021 1:45 PM

## 2021-03-02 NOTE — PROGRESS NOTES
Pharmacy to Dose Warfarin    Pharmacy consulted to dose warfarin for Afib. INR Goal: 2-3    INR today: 1.81    Assessment/Plan:  - INR subtherapeutic again today  - Warfarin has been discontinued by surgery. Will discontinue pharmacy consult to dose warfarin.  - Please re-consult pharmacy when warfarin resumed    Pharmacy will continue to follow.     Rema Knapp, PharmD, BCPS  Clinical Pharmacist  [id number]

## 2021-03-02 NOTE — PLAN OF CARE
Nutrition Problem #1: Inadequate oral intake  Intervention: Food and/or Nutrient Delivery: Start Parenteral Nutrititon  Nutritional Goals: PN to goal

## 2021-03-02 NOTE — PROGRESS NOTES
Nephrology Progress  Note  [phone]  [phone]   [email or web address].        Reason for Consult: TORIBIO/ CKD / Edema   The patient is a 77 y.o. female with significant past medical history of Coronary  artery disease s/p CABG, congestive heart failure , chronic kidney disease stage IIIa , atrial fibrillation , T2DM  hyperlipidemia , hypertension who presented to the ER with progressive swelling of her legs and ankles for the last week. She says she has not been measuring her daily weights, but she says she has been watching her salt intake and taking her prescribed diuretics. She has an aching pain over her left kidney area however there is no dysuria or gross hematuria. There is no fever, cough hemoptysis  She has no nausea vomiting diarrhea  Prior to admission she was taking diuretics Demadex with metolazone and spironolactone. She is not on NSAIDs or ACE inhibitor or ARB  There is no skin new skin rash or arthralgias    Interval History : This morning abdominal pain   CT abdomen and Pelvis ( no contrast )  1. Pneumoperitoneum with multiple loculations identified, most likely benign   pneumoperitoneum related to rupture of small bowel pneumatosis. 2. Diverticulosis without scan evidence for diverticulitis. 3. Enlarging left ovarian cyst, now measuring 9.1 cm. PICC placed Rt arm not working     PHYSICAL EXAM:    Vitals:    BP (!) 98/57   Pulse 87   Temp 97.4 °F (36.3 °C) (Oral)   Resp 18   Ht 5' 8\" (1.727 m)   Wt 204 lb 3.2 oz (92.6 kg)   SpO2 94%   BMI 31.05 kg/m²   I/O last 3 completed shifts: In: 960 [P.O.:960]  Out: 1625 [Urine:1625]  No intake/output data recorded. Physical Exam:  Gen:  alert, oriented x 3, dyspnea+  PERRL , EOM +  Pallor +, No icterus  JVP  raised   CV: RR , tachycardia ,  no murmur or rub . Mid sternal scar of CABG   Lungs:B/ L air entry, Normal breath sounds   Abd: distended, tender scars+  Ext: 1+ leg and ankle  edema, no cyanosis  Skin: Warm.   No rash  Neuro: nonfocal.      DATA:    CBC with Differential:    Lab Results   Component Value Date    WBC 5.7 03/02/2021    RBC 4.37 03/02/2021    HGB 12.0 03/02/2021    HCT 37.7 03/02/2021     03/02/2021    MCV 86.1 03/02/2021    MCH 27.5 03/02/2021    MCHC 31.9 03/02/2021    RDW 17.1 03/02/2021    SEGSPCT 64.1 09/02/2020    BANDSPCT 1 01/14/2019    LYMPHOPCT 16.4 03/02/2021    MONOPCT 16.4 03/02/2021    BASOPCT 0.5 03/02/2021    MONOSABS 0.9 03/02/2021    LYMPHSABS 0.9 03/02/2021    EOSABS 0.2 03/02/2021    BASOSABS 0.0 03/02/2021     CMP:    Lab Results   Component Value Date     03/02/2021    K 3.1 03/02/2021    CL 94 03/02/2021    CO2 33 03/02/2021    BUN 50 03/02/2021    CREATININE 1.4 03/02/2021    GFRAA 44 03/02/2021    AGRATIO 1.0 03/02/2021    LABGLOM 37 03/02/2021    LABGLOM 45 12/06/2018    GLUCOSE 121 03/02/2021    PROT 5.7 03/02/2021    LABALBU 2.9 03/02/2021    CALCIUM 8.4 03/02/2021    BILITOT 0.9 03/02/2021    ALKPHOS 155 03/02/2021    AST 18 03/02/2021    ALT 15 03/02/2021     Phosphorus:    Lab Results   Component Value Date    PHOS 3.7 05/12/2020     Troponin:    Lab Results   Component Value Date    TROPONINI <0.01 05/10/2020     U/A:    Lab Results   Component Value Date    COLORU YELLOW 02/28/2021    PROTEINU Negative 02/28/2021    PHUR 7.0 02/28/2021    WBCUA 31 05/11/2020    RBCUA neg 02/12/2021    RBCUA 1 05/11/2020    YEAST neg 02/12/2021    BACTERIA trace 02/12/2021    BACTERIA 1+ 05/11/2020    CLARITYU Clear 02/28/2021    SPECGRAV 1.010 02/28/2021    LEUKOCYTESUR Negative 02/28/2021    UROBILINOGEN 1.0 02/28/2021    BILIRUBINUR Negative 02/28/2021    BLOODU Negative 02/28/2021    GLUCOSEU Negative 02/28/2021           IMPRESSION/RECOMMENDATIONS:      1. TORIBIO  : Secondary to congestive heart failure and its treatment. Diuretics are on hold however feel that she will need them. FeNa> 1 , indicates ATN   Cr 1.4      2. CHF  Hold Torsemide as NPO , no fluid intake     3.  Hypokalemia   Sec to diuretics , K 3.1   Replace IV as NPO     4. CAD     5. CKD stage 3a   Baseline Cr 1.3, eGFR 40 , No proteinuria    check Urine    Ultrasound (2018 )   right kidney measures 11.5 cm in length and the left kidney measures 11.6   cm in length     6. PneumoPeritoneun   For conservative treatment at this time     Thank you for allowing me to participate in the care of this patient. I will continue to follow along. Please call with questions.     Hoang Brandt MD  3/2/2021  [facility]

## 2021-03-02 NOTE — PROGRESS NOTES
[facility] PROGRESS NOTE    3/2/2021 1:39 PM        Name: Nimo Gallegos . Admitted: 2/26/2021  Primary Care Provider: Mauricio Sadler MD (Tel: [phone])                        Subjective:  . Lying in bed having some nausea no chest pain no shortness of breath some abdominal pain that is better than yesterday.   Reviewed interval ancillary notes    Current Medications      bisacodyl (DULCOLAX) suppository 10 mg, Daily PRN      phenol 1.4 % mouth spray 1 spray, Q2H PRN      lidocaine PF 1 % injection 5 mL, Once      sodium chloride flush 0.9 % injection 10 mL, 2 times per day      sodium chloride flush 0.9 % injection 10 mL, PRN      piperacillin-tazobactam (ZOSYN) 3,375 mg in dextrose 5 % 50 mL IVPB extended infusion (mini-bag), Q8H      sodium chloride flush 0.9 % injection 10 mL, 2 times per day      sodium chloride flush 0.9 % injection 10 mL, PRN      [START ON 3/3/2021] pantoprazole (PROTONIX) injection 40 mg, Daily      potassium chloride 20 mEq/50 mL IVPB (Central Line), PRN      [START ON 3/3/2021] digoxin (LANOXIN) tablet 125 mcg, Daily      [START ON 3/3/2021] insulin glargine (LANTUS;BASAGLAR) injection pen 25 Units, QAM      exenatide (BYETTA) injection 10 mcg -- PATIENT SUPPLIED, BID WC      sodium chloride flush 0.9 % injection 10 mL, 2 times per day      sodium chloride flush 0.9 % injection 10 mL, PRN      promethazine (PHENERGAN) tablet 12.5 mg, Q6H PRN    Or      ondansetron (ZOFRAN) injection 4 mg, Q6H PRN      polyethylene glycol (GLYCOLAX) packet 17 g, Daily PRN      acetaminophen (TYLENOL) tablet 650 mg, Q6H PRN    Or      acetaminophen (TYLENOL) suppository 650 mg, Q6H PRN      insulin lispro (1 Unit Dial) 0-12 Units, TID WC      insulin lispro (1 Unit Dial) 0-6 Units, Nightly      glucose (GLUTOSE) 40 % oral gel 15 g, patient and family on diagnosis and plan   Problem List  Active Problems:    CHF (congestive heart failure), NYHA class I, acute on chronic, combined ([facility] [address]    Stage 3 chronic kidney disease    Coronary artery disease involving native heart without angina pectoris  Resolved Problems:    * No resolved hospital problems. *       Assessment & Plan:   71-year-old female who was admitted for weight gain of 13 pounds found to have acute on chronic CHF exacerbation. Patient has been diuresing with IV Lasix here. Found to have bump in creatinine with renal failure with creatinine of 1.5 cardiology and nephrology has been following.       Pneumoperitoneum with multiple loculations likely secondary to rupture of small bowel pneumoatosis  - npo  - iv atbx  - surgery consult pending    Acute on chronic systolic CHF  - diuretics per nephro      Hypokalemia  - iv potassium today    TORIBIO on CKD3:   - improving nephro onboard    Chronic A. Fib  - continue meds, cards on board  - coumadin on hold, if stable tmmrw can start on heparin gtt          Diet: Diet NPO Effective Now  Code:Full Code    Wong Keyes MD   3/2/2021 1:39 PM

## 2021-03-02 NOTE — CONSULTS
PICC line education:    -Risks  -Benefits  -Alternatives  -Procedure    Discussed the above with patient, verbalized understanding, answered all questions. Provided with information on PICC care to review. PICC tip verified via 3[employer] (Ok to use). Reported off to patient's  Nurse Melanie Zurita.

## 2021-03-02 NOTE — CONSULTS
Clinical Pharmacy Note    Pharmacy consulted by Dr. Jazmin Moreno to manage TPN    Current TPN rate: 40 ml/hr  Goal TPN rate: TBD    Labs:  General Labs:  BMP:    Lab Results   Component Value Date     03/02/2021    K 3.1 03/02/2021    CL 94 03/02/2021    CO2 33 03/02/2021    BUN 50 03/02/2021    LABALBU 2.9 03/02/2021    CREATININE 1.4 03/02/2021    CALCIUM 8.4 03/02/2021    GFRAA 44 03/02/2021    LABGLOM 37 03/02/2021    LABGLOM 45 12/06/2018    GLUCOSE 121 03/02/2021     Blood sugars:     Electrolyte replacement as follows:   Replace potassium with 40 mEq of potassium chloride administered IV over 2 hours    Blood sugar management:  Plan to start q4 hour Humalog to medium dose sliding scale. Plan to start TPN at 40 ml/hr. Thank you for allowing pharmacy to participate in the care of this patient.     Rema Knapp, PharmD 3/2/2021

## 2021-03-02 NOTE — CONSULTS
hemorrhoids    COLONOSCOPY  10/10/2018    w/biopsy performed by Fredy Minor MD at [facility] COLONOSCOPY N/A 8/7/2020    COLONOSCOPY DIAGNOSTIC performed by Fer Abel MD at [address]  08/21/2018    Dr. Bhupinder Murdock - x3 (LIMA-LAD, L SV-D1-PLV) modified BL MAZE procedure w/obliteration of WAYNE using 45mm AtriClip    HYSTERECTOMY      INSERTABLE CARDIAC MONITOR Left 08/16/2018    Dr. Michi Turner - Annalisa Michaels # [id number] Medtronic    MITRAL VALVE REPLACEMENT  08/21/2018    Dr. Bhupinder Murdock - 27mm Medtronic Cinch tissue valve    PAIN MANAGEMENT PROCEDURE N/A 12/18/2020    C6-C7 MIDLINE  EPIDURAL STEROID INJECTION WITH FLUOROSCOPY performed by Trinity Belcher MD at [address] Bilateral 1/18/2021    BILATERAL T11 TRANSFORAMINAL EPIDURAL STEROID INJECTION WITH FLUOROSCOPY performed by Trinity Belcher MD at [address] TRANSESOPHAGEAL ECHOCARDIOGRAM  08/21/2018    during CABG/MVR    TUNNELED VENOUS CATHETER PLACEMENT Left 08/23/2018    Dr. Melo Lomas - IJ for HD---since removed    UPPER GASTROINTESTINAL ENDOSCOPY N/A 10/10/2018    w/biopsy performed by Fredy Minor MD at [address] [facility]       Scheduled Meds:   sodium chloride flush  10 mL Intravenous 2 times per day    piperacillin-tazobactam  3,375 mg Intravenous Q8H    sodium chloride flush  10 mL Intravenous 2 times per day    [START ON 3/3/2021] pantoprazole  40 mg Intravenous Daily    [START ON 3/3/2021] digoxin  125 mcg Oral Daily    [START ON 3/3/2021] insulin glargine  25 Units Subcutaneous QAM    [START ON 3/9/2021] fat emulsion  250 mL Intravenous Once per day on Mon Thu    insulin lispro  0-12 Units Subcutaneous Q4H    exenatide  10 mcg Subcutaneous BID WC    sodium chloride flush  10 mL Intravenous 2 times per day    gabapentin  300 mg Oral Nightly    montelukast  10 mg Oral Nightly     Continuous Infusions:   PN-Adult Premix 5/20 - Standard Electrolytes - Central Line      dextrose 100 mL/hr (03/02/21 1519)     PRN Meds:.bisacodyl, phenol, sodium chloride flush, sodium chloride flush, potassium chloride, morphine, sodium chloride flush, promethazine **OR** ondansetron, polyethylene glycol, acetaminophen **OR** acetaminophen, glucose, dextrose, glucagon (rDNA), dextrose, albuterol sulfate HFA, oxyCODONE    Prior to Admission medications    Medication Sig Start Date End Date Taking? Authorizing Provider   predniSONE (DELTASONE) 10 MG tablet Take 10 mg by mouth daily   Yes Historical Provider, MD   torsemide (DEMADEX) 100 MG tablet TAKE 1 TABLET DAILY 2/17/21  Yes Lety Maradiaga MD   OXYCODONE HCL PO Take 10 mg by mouth As of 1/21/2021,  states patient is taking 10mg with edge being taken off her pain after 3 hours. Yes Historical Provider, MD   ACETAMINOPHEN PO Take 500 mg by mouth every 6 hours as needed    Yes Historical Provider, MD   blood glucose test strips (FREESTYLE LITE) strip USE TO TEST FOUR TIMES A DAY 1/5/21  Yes Lety Maradiaga MD   methocarbamol (ROBAXIN) 750 MG tablet Take 750 mg by mouth as needed (back pain)   Yes Historical Provider, MD   spironolactone (ALDACTONE) 50 MG tablet TAKE 1 TABLET DAILY 1/4/21  Yes Maria T Barrios MD   metOLazone (ZAROXOLYN) 2.5 MG tablet Take 1 tablet by mouth daily  Patient taking differently: Take 2.5 mg by mouth daily as needed  12/21/20  Yes Lety Maradiaga MD   gabapentin (NEURONTIN) 300 MG capsule Take 1 capsule by mouth nightly for 90 days.  Intended supply: 30 days 12/14/20 3/14/21 Yes MUNDO [address]   albuterol sulfate  (90 Base) MCG/ACT inhaler USE 2 INHALATIONS EVERY 6 HOURS AS NEEDED FOR WHEEZING 11/17/20  Yes Lety Maradiaga MD   carvedilol (COREG) 25 MG tablet Take 1 tablet by mouth 2 times daily 11/12/20  Yes Kristie Councilman, MD   potassium chloride (KLOR-CON M) 10 MEQ extended release tablet TAKE 1 TABLET DAILY 9/23/20  Yes Lety Maradiaga MD insulin glargine (LANTUS SOLOSTAR) 100 UNIT/ML injection pen INJECT 50 UNITS IN THE MORNING AND 24 UNITS AT BEDTIME  Patient taking differently: every morning 30 units in the morning and PRN at night if needed 7/10/20  Yes Honey Harmon MD   albuterol (PROVENTIL) (2.5 MG/3ML) 0.083% nebulizer solution Take 3 mLs by nebulization every 6 hours as needed for Wheezing 6/2/20  Yes Honey Harmon MD   exenatide (BYETTA 10 MCG PEN) 10 MCG/0.04ML injection Inject 0.04 mLs into the skin daily  Patient taking differently: Inject 10 mcg into the skin 2 times daily (with meals)  5/29/20  Yes Honey Harmon MD   polyethylene glycol (GLYCOLAX) 17 GM/SCOOP powder Take 17 g by mouth every other day    Yes Historical Provider, MD   omeprazole (PRILOSEC) 20 MG delayed release capsule Take 20 mg by mouth daily   Yes Historical Provider, MD   FreeStyle Lancets MISC 1 each by Does not apply route 4 times daily 4/29/20  Yes Honey Harmon MD   ondansetron (ZOFRAN) 4 MG tablet Take 1 tablet by mouth 3 times daily as needed for Nausea or Vomiting 3/26/20  Yes Honey Harmon MD   warfarin (COUMADIN) 5 MG tablet TAKE 1 TABLET DAILY  Patient taking differently: Take 5 mg by mouth daily Managed by PCP 2/26/20  Yes Toño Encinas MD   montelukast (SINGULAIR) 10 MG tablet TAKE 1 TABLET NIGHTLY 2/26/20  Yes Toño Encinas MD   Blood Glucose Monitoring Suppl (EMBRACE PRO GLUCOSE METER) GAETANO 1 Device by Does not apply route 4 times daily 2/4/20  Yes Honey Harmon MD   HUMALOG KWIKPEN 100 UNIT/ML SOPN TAKE AS INSTRUCTED BY YOUR PRESCRIBER  Patient taking differently:  Only if high blood sugar-has not been using 12/30/19  Yes Honey Harmon MD   nystatin (MYCOSTATIN) 068126 UNIT/ML suspension TAKE ONE TEASPOONFUL BY MOUTH FOUR TIMES A DAY FOR 5 DAYS 11/20/19  Yes Honey Harmon MD   aspirin 81 MG chewable tablet Take 1 tablet by mouth daily 6/7/19  Yes Honey Harmon MD   BD PEN NEEDLE JANNET U/F 32G X 4 MM MISC USE 1 PEN NEEDLE FOUR TIMES A DAY 3/22/19  Yes Mikie Cullen MD   vitamin B-12 500 MCG tablet Take 1 tablet by mouth daily 10/12/18  Yes Armond Sacks, MD        Allergies:  Yerukoss-kljaynu-wylejw [fluocinolone], Ciprofloxacin, Diovan [valsartan], Flagyl [metronidazole], Metformin and related [metformin and related], Benazepril, Morphine, Saxagliptin, and Levaquin [levofloxacin]    Social History:   Social History     Socioeconomic History    Marital status:      Spouse name: Not on file    Number of children: Not on file    Years of education: Not on file    Highest education level: Not on file   Occupational History    Not on file   Social Needs    Financial resource strain: Not on file    Food insecurity     Worry: Not on file     Inability: Not on file    Transportation needs     Medical: Not on file     Non-medical: Not on file   Tobacco Use    Smoking status: Never Smoker    Smokeless tobacco: Never Used   Substance and Sexual Activity    Alcohol use: No    Drug use: No    Sexual activity: Not on file   Lifestyle    Physical activity     Days per week: Not on file     Minutes per session: Not on file    Stress: Not on file   Relationships    Social connections     Talks on phone: Not on file     Gets together: Not on file     Attends Yazdanism service: Not on file     Active member of club or organization: Not on file     Attends meetings of clubs or organizations: Not on file     Relationship status: Not on file    Intimate partner violence     Fear of current or ex partner: Not on file     Emotionally abused: Not on file     Physically abused: Not on file     Forced sexual activity: Not on file   Other Topics Concern    Not on file   Social History Narrative    Not on file       Family History:    Family History   Problem Relation Age of Onset    Cancer Father     Asthma Mother     Hypertension Mother     Heart Disease Mother     High Blood Pressure Mother Review of Systems:  CONSTITUTIONAL:  Negative except as above  HEENT:  negative  RESPIRATORY:  negative  CARDIOVASCULAR:  negative  GASTROINTESTINAL:  negative except as above   GENITOURINARY:  negative  HEMATOLOGIC/LYMPHATIC:  negative  NEUROLOGICAL:  Negative      Vital Signs:  Patient Vitals for the past 24 hrs:   BP Temp Temp src Pulse Resp SpO2 Height Weight   21 1421 -- -- -- -- -- -- 5' 8\" (1.727 m) --   21 1155 -- -- -- 119 -- -- -- --   21 1145 110/69 97.9 °F (36.6 °C) Oral 119 18 92 % -- --   21 0800 (!) 98/57 97.4 °F (36.3 °C) Oral 87 18 94 % -- --   21 0545 -- -- -- -- -- -- -- 204 lb 3.2 oz (92.6 kg)   21 0510 104/75 97.7 °F (36.5 °C) Oral 118 16 95 % -- --   21 0000 106/72 98 °F (36.7 °C) Oral 122 16 94 % -- --   21 104/70 97.9 °F (36.6 °C) Oral 133 16 93 % -- --   21 1817 -- -- -- 128 -- -- -- --      TEMPERATURE HISTORY 24H: Temp (24hrs), Av.8 °F (36.6 °C), Min:97.4 °F (36.3 °C), Max:98 °F (36.7 °C)    BLOOD PRESSURE HISTORY: Systolic (87ZRR), YWU:436 , Min:97 , NX    Diastolic (27FWW), ANI:40, Min:57, Max:80    Admission Weight: 211 lb (95.7 kg)       Intake/Output Summary (Last 24 hours) at 3/2/2021 3705  Last data filed at 3/2/2021 0554  Gross per 24 hour   Intake --   Output 925 ml   Net -925 ml     Drain/tube Output:         Physical Exam:  CONSTITUTIONAL:  alert, no apparent distress   NEUROLOGIC:  Mental Status Exam:  Level of Alertness:   awake  Orientation:  Oriented to person, place, time  EYES:  sclera clear  HENT:  normocepalic, without obvious abnormality  NECK:  supple, symmetrical, trachea midline  LUNGS:  clear to auscultation  CARDIOVASCULAR:  regular rate and rhythm   ABDOMEN: soft, distended, moderate epigastric tenderness to deep palpation without peritonitis  SKIN:  no bruising or bleeding, normal skin color, texture, turgor and no redness, warmth, or swelling      Labs:    CBC:    Recent Labs 03/02/21  0540   WBC 5.7   HGB 12.0   HCT 37.7        BMP:    Recent Labs     02/28/21  0609 03/01/21  0546 03/01/21  2107 03/02/21  0540    134*  --  136   K 3.1* 2.9* 3.3* 3.1*   CL 96* 94*  --  94*   CO2 30 29  --  33*   BUN 54* 51*  --  50*   CREATININE 1.9* 1.5*  --  1.4*   GLUCOSE 120* 144*  --  121*     Hepatic:   Recent Labs     03/02/21  0540   AST 18   ALT 15   BILITOT 0.9   ALKPHOS 155*     Amylase: No results for input(s): AMYLASE in the last 72 hours. Lipase: No results for input(s): LIPASE in the last 72 hours. Mag:      Recent Labs     03/01/21 2107 03/02/21  0540   MG 2.20 2.20      Phos:   No results for input(s): PHOS in the last 72 hours. Coags:   Recent Labs     02/28/21  0609 03/01/21  0545 03/02/21  0540   INR 2.34* 1.89* 1.81*       Imaging:  I have personally reviewed the following films:  Ct Abdomen Pelvis Wo Contrast Additional Contrast? Radiologist Recommendation    Result Date: 3/2/2021  EXAMINATION: CT OF THE ABDOMEN AND PELVIS WITHOUT CONTRAST 3/2/2021 1:42 am TECHNIQUE: CT of the abdomen and pelvis was performed without the administration of intravenous contrast. Multiplanar reformatted images are provided for review. Dose modulation, iterative reconstruction, and/or weight based adjustment of the mA/kV was utilized to reduce the radiation dose to as low as reasonably achievable.  COMPARISON: 05/13/2020 HISTORY: ORDERING SYSTEM PROVIDED HISTORY: evaluate for pneumoperitoneum, nausea, abdominal distention,  hx pneumatosis, TECHNOLOGIST PROVIDED HISTORY: Reason for exam:->evaluate for pneumoperitoneum, nausea, abdominal distention,  hx pneumatosis, Additional Contrast?->Radiologist Recommendation Reason for Exam: evaluate for pneumoperitoneum, nausea, abdominal distention, hx pneumatosis, Acuity: Acute Type of Exam: Initial Relevant Medical/Surgical History: evaluate for pneumoperitoneum, nausea, abdominal distention,  hx pneumatosis, FINDINGS: Lower Chest: There is bibasilar atelectasis. Organs: The liver, spleen, pancreas, adrenal glands and kidneys are grossly negative given the limitations of the noncontrast technique. GI/Bowel: Small bowel caliber is normal.  Significant diffuse small bowel pneumatosis is again seen. The appendix is normal.  There are diverticula throughout the colon without evidence for diverticulitis. Pelvis: Left adnexal cyst has enlarged and now measures 9.1 cm. The uterus and bladder are grossly negative. Peritoneum/Retroperitoneum: Pneumoperitoneum is identified anteriorly there are multiple thin linear densities throughout the pneumoperitoneum. There is no free fluid or adenopathy. Aortic caliber is normal. Bones/Soft Tissues: No acute findings. 1. Pneumoperitoneum with multiple loculations identified, most likely benign pneumoperitoneum related to rupture of small bowel pneumatosis. 2. Diverticulosis without scan evidence for diverticulitis. 3. Enlarging left ovarian cyst, now measuring 9.1 cm. MRI or surgical evaluation is recommended. Critical results were called by Dr. Steff Cerna MD to [facility] on 3/2/2021 at 02:33. Xr Abdomen (kub) (single Ap View)    Result Date: 3/2/2021  EXAMINATION: ONE SUPINE XRAY VIEW(S) OF THE ABDOMEN 3/1/2021 10:13 pm COMPARISON: 03/01/2019 and CT scan from 05/13/2020 HISTORY: ORDERING SYSTEM PROVIDED HISTORY: abdominal pain, nausea, TECHNOLOGIST PROVIDED HISTORY: Reason for exam:->abdominal pain, nausea, Reason for Exam: abdominal pain, nausea Acuity: Unknown Type of Exam: Ongoing Vomiting FINDINGS: There is a large amount of stool throughout the colon. No dilated small bowel is identified. There is a question of gas outlining both walls of bowel in the left mid abdomen. No acute osseous abnormality is seen. Questionable Riegler sign which would suggest the presence of pneumoperitoneum.   However, the appearance on the current study may simply represent the known pneumatosis this patient has

## 2021-03-03 LAB
A/G RATIO: 1 (ref 1.1–2.2)
ALBUMIN SERPL-MCNC: 2.6 G/DL (ref 3.4–5)
ALP BLD-CCNC: 153 U/L (ref 40–129)
ALT SERPL-CCNC: 14 U/L (ref 10–40)
ANION GAP SERPL CALCULATED.3IONS-SCNC: 8 MMOL/L (ref 3–16)
APTT: 36.2 SEC (ref 24.2–36.2)
AST SERPL-CCNC: 18 U/L (ref 15–37)
BASOPHILS ABSOLUTE: 0 K/UL (ref 0–0.2)
BASOPHILS RELATIVE PERCENT: 0.5 %
BILIRUB SERPL-MCNC: 1.1 MG/DL (ref 0–1)
BUN BLDV-MCNC: 42 MG/DL (ref 7–20)
CALCIUM SERPL-MCNC: 8.6 MG/DL (ref 8.3–10.6)
CHLORIDE BLD-SCNC: 97 MMOL/L (ref 99–110)
CO2: 31 MMOL/L (ref 21–32)
CREAT SERPL-MCNC: 1.4 MG/DL (ref 0.6–1.2)
EOSINOPHILS ABSOLUTE: 0.3 K/UL (ref 0–0.6)
EOSINOPHILS RELATIVE PERCENT: 6.1 %
GFR AFRICAN AMERICAN: 44
GFR NON-AFRICAN AMERICAN: 37
GLOBULIN: 2.7 G/DL
GLUCOSE BLD-MCNC: 106 MG/DL (ref 70–99)
GLUCOSE BLD-MCNC: 128 MG/DL (ref 70–99)
GLUCOSE BLD-MCNC: 147 MG/DL (ref 70–99)
GLUCOSE BLD-MCNC: 154 MG/DL (ref 70–99)
GLUCOSE BLD-MCNC: 190 MG/DL (ref 70–99)
GLUCOSE BLD-MCNC: 198 MG/DL (ref 70–99)
GLUCOSE BLD-MCNC: 227 MG/DL (ref 70–99)
HCT VFR BLD CALC: 35.2 % (ref 36–48)
HEMOGLOBIN: 11.3 G/DL (ref 12–16)
INR BLD: 1.95 (ref 0.86–1.14)
LACTIC ACID: 1 MMOL/L (ref 0.4–2)
LYMPHOCYTES ABSOLUTE: 0.7 K/UL (ref 1–5.1)
LYMPHOCYTES RELATIVE PERCENT: 12.7 %
MAGNESIUM: 2.3 MG/DL (ref 1.8–2.4)
MCH RBC QN AUTO: 27.5 PG (ref 26–34)
MCHC RBC AUTO-ENTMCNC: 32 G/DL (ref 31–36)
MCV RBC AUTO: 85.7 FL (ref 80–100)
MONOCYTES ABSOLUTE: 0.7 K/UL (ref 0–1.3)
MONOCYTES RELATIVE PERCENT: 12.3 %
NEUTROPHILS ABSOLUTE: 3.7 K/UL (ref 1.7–7.7)
NEUTROPHILS RELATIVE PERCENT: 68.4 %
PDW BLD-RTO: 16.8 % (ref 12.4–15.4)
PERFORMED ON: ABNORMAL
PHOSPHORUS: 3.7 MG/DL (ref 2.5–4.9)
PLATELET # BLD: 171 K/UL (ref 135–450)
PMV BLD AUTO: 8.3 FL (ref 5–10.5)
POTASSIUM SERPL-SCNC: 3.6 MMOL/L (ref 3.5–5.1)
PREALBUMIN: 12.4 MG/DL (ref 20–40)
PROTHROMBIN TIME: 22.8 SEC (ref 10–13.2)
RBC # BLD: 4.11 M/UL (ref 4–5.2)
SODIUM BLD-SCNC: 136 MMOL/L (ref 136–145)
TOTAL PROTEIN: 5.3 G/DL (ref 6.4–8.2)
TRANSFERRIN: 236 MG/DL (ref 200–360)
TRIGL SERPL-MCNC: 94 MG/DL (ref 0–150)
WBC # BLD: 5.4 K/UL (ref 4–11)

## 2021-03-03 PROCEDURE — 84466 ASSAY OF TRANSFERRIN: CPT

## 2021-03-03 PROCEDURE — 80053 COMPREHEN METABOLIC PANEL: CPT

## 2021-03-03 PROCEDURE — 83605 ASSAY OF LACTIC ACID: CPT

## 2021-03-03 PROCEDURE — 6370000000 HC RX 637 (ALT 250 FOR IP): Performed by: NURSE PRACTITIONER

## 2021-03-03 PROCEDURE — APPNB30 APP NON BILLABLE TIME 0-30 MINS: Performed by: NURSE PRACTITIONER

## 2021-03-03 PROCEDURE — 6370000000 HC RX 637 (ALT 250 FOR IP): Performed by: INTERNAL MEDICINE

## 2021-03-03 PROCEDURE — 85025 COMPLETE CBC W/AUTO DIFF WBC: CPT

## 2021-03-03 PROCEDURE — 6360000002 HC RX W HCPCS: Performed by: NURSE PRACTITIONER

## 2021-03-03 PROCEDURE — 99232 SBSQ HOSP IP/OBS MODERATE 35: CPT | Performed by: NURSE PRACTITIONER

## 2021-03-03 PROCEDURE — 1200000000 HC SEMI PRIVATE

## 2021-03-03 PROCEDURE — 84134 ASSAY OF PREALBUMIN: CPT

## 2021-03-03 PROCEDURE — 6360000002 HC RX W HCPCS: Performed by: INTERNAL MEDICINE

## 2021-03-03 PROCEDURE — 2580000003 HC RX 258: Performed by: NURSE PRACTITIONER

## 2021-03-03 PROCEDURE — 85610 PROTHROMBIN TIME: CPT

## 2021-03-03 PROCEDURE — 99232 SBSQ HOSP IP/OBS MODERATE 35: CPT | Performed by: SURGERY

## 2021-03-03 PROCEDURE — 2500000003 HC RX 250 WO HCPCS: Performed by: SURGERY

## 2021-03-03 PROCEDURE — 85730 THROMBOPLASTIN TIME PARTIAL: CPT

## 2021-03-03 PROCEDURE — 84100 ASSAY OF PHOSPHORUS: CPT

## 2021-03-03 PROCEDURE — 6360000002 HC RX W HCPCS: Performed by: SURGERY

## 2021-03-03 PROCEDURE — C9113 INJ PANTOPRAZOLE SODIUM, VIA: HCPCS | Performed by: INTERNAL MEDICINE

## 2021-03-03 PROCEDURE — 2580000003 HC RX 258: Performed by: INTERNAL MEDICINE

## 2021-03-03 PROCEDURE — 83735 ASSAY OF MAGNESIUM: CPT

## 2021-03-03 PROCEDURE — 84478 ASSAY OF TRIGLYCERIDES: CPT

## 2021-03-03 PROCEDURE — APPSS15 APP SPLIT SHARED TIME 0-15 MINUTES: Performed by: NURSE PRACTITIONER

## 2021-03-03 RX ORDER — DIGOXIN 0.25 MG/ML
125 INJECTION INTRAMUSCULAR; INTRAVENOUS DAILY
Status: DISCONTINUED | OUTPATIENT
Start: 2021-03-04 | End: 2021-03-10

## 2021-03-03 RX ORDER — POTASSIUM CHLORIDE 29.8 MG/ML
20 INJECTION INTRAVENOUS ONCE
Status: COMPLETED | OUTPATIENT
Start: 2021-03-03 | End: 2021-03-03

## 2021-03-03 RX ORDER — DIGOXIN 0.25 MG/ML
125 INJECTION INTRAMUSCULAR; INTRAVENOUS DAILY
Status: DISCONTINUED | OUTPATIENT
Start: 2021-03-03 | End: 2021-03-03

## 2021-03-03 RX ORDER — EXENATIDE 250 UG/ML
10 INJECTION SUBCUTANEOUS 2 TIMES DAILY WITH MEALS
Qty: 3 PEN | Refills: 1 | Status: SHIPPED | OUTPATIENT
Start: 2021-03-03 | End: 2021-08-03

## 2021-03-03 RX ADMIN — PIPERACILLIN AND TAZOBACTAM 3375 MG: 3; .375 INJECTION, POWDER, LYOPHILIZED, FOR SOLUTION INTRAVENOUS at 01:29

## 2021-03-03 RX ADMIN — Medication 10 ML: at 21:35

## 2021-03-03 RX ADMIN — MORPHINE SULFATE 2 MG: 2 INJECTION, SOLUTION INTRAMUSCULAR; INTRAVENOUS at 05:53

## 2021-03-03 RX ADMIN — PIPERACILLIN AND TAZOBACTAM 3375 MG: 3; .375 INJECTION, POWDER, LYOPHILIZED, FOR SOLUTION INTRAVENOUS at 18:30

## 2021-03-03 RX ADMIN — INSULIN LISPRO 4 UNITS: 100 INJECTION, SOLUTION INTRAVENOUS; SUBCUTANEOUS at 13:26

## 2021-03-03 RX ADMIN — MORPHINE SULFATE 2 MG: 2 INJECTION, SOLUTION INTRAMUSCULAR; INTRAVENOUS at 18:39

## 2021-03-03 RX ADMIN — ONDANSETRON 4 MG: 2 INJECTION INTRAMUSCULAR; INTRAVENOUS at 05:53

## 2021-03-03 RX ADMIN — POTASSIUM CHLORIDE 20 MEQ: 29.8 INJECTION, SOLUTION INTRAVENOUS at 01:30

## 2021-03-03 RX ADMIN — PROMETHAZINE HYDROCHLORIDE 12.5 MG: 25 INJECTION INTRAMUSCULAR; INTRAVENOUS at 18:39

## 2021-03-03 RX ADMIN — PANTOPRAZOLE SODIUM 40 MG: 40 INJECTION, POWDER, FOR SOLUTION INTRAVENOUS at 08:56

## 2021-03-03 RX ADMIN — MORPHINE SULFATE 2 MG: 2 INJECTION, SOLUTION INTRAMUSCULAR; INTRAVENOUS at 10:20

## 2021-03-03 RX ADMIN — DIGOXIN 125 MCG: 125 TABLET ORAL at 09:19

## 2021-03-03 RX ADMIN — MORPHINE SULFATE 2 MG: 2 INJECTION, SOLUTION INTRAMUSCULAR; INTRAVENOUS at 14:54

## 2021-03-03 RX ADMIN — PIPERACILLIN AND TAZOBACTAM 3375 MG: 3; .375 INJECTION, POWDER, LYOPHILIZED, FOR SOLUTION INTRAVENOUS at 08:56

## 2021-03-03 RX ADMIN — INSULIN LISPRO 2 UNITS: 100 INJECTION, SOLUTION INTRAVENOUS; SUBCUTANEOUS at 21:36

## 2021-03-03 RX ADMIN — ENOXAPARIN SODIUM 90 MG: 100 INJECTION SUBCUTANEOUS at 14:54

## 2021-03-03 RX ADMIN — INSULIN LISPRO 2 UNITS: 100 INJECTION, SOLUTION INTRAVENOUS; SUBCUTANEOUS at 09:03

## 2021-03-03 RX ADMIN — ASCORBIC ACID, VITAMIN A PALMITATE, CHOLECALCIFEROL, THIAMINE HYDROCHLORIDE, RIBOFLAVIN-5 PHOSPHATE SODIUM, PYRIDOXINE HYDROCHLORIDE, NIACINAMIDE, DEXPANTHENOL, ALPHA-TOCOPHEROL ACETATE, VITAMIN K1, FOLIC ACID, BIOTIN, CYANOCOBALAMIN: 200; 3300; 200; 6; 3.6; 6; 40; 15; 10; 150; 600; 60; 5 INJECTION, SOLUTION INTRAVENOUS at 18:30

## 2021-03-03 RX ADMIN — POTASSIUM CHLORIDE 20 MEQ: 29.8 INJECTION, SOLUTION INTRAVENOUS at 13:27

## 2021-03-03 RX ADMIN — POTASSIUM CHLORIDE 20 MEQ: 29.8 INJECTION, SOLUTION INTRAVENOUS at 00:21

## 2021-03-03 ASSESSMENT — PAIN SCALES - GENERAL
PAINLEVEL_OUTOF10: 0
PAINLEVEL_OUTOF10: 4
PAINLEVEL_OUTOF10: 6
PAINLEVEL_OUTOF10: 0
PAINLEVEL_OUTOF10: 5
PAINLEVEL_OUTOF10: 8
PAINLEVEL_OUTOF10: 4
PAINLEVEL_OUTOF10: 10
PAINLEVEL_OUTOF10: 6

## 2021-03-03 NOTE — PROGRESS NOTES
[facility]        Progress Note    Patient Name: Dewayne Bush  MRN: [id number]  YOB: 1945  Date of Evaluation: 3/3/2021    Chief Complaint: Abdominal pain    Subjective:  No acute events overnight  Pain resolved, denies any at present  Having some nausea despite NGT, no vomiting  Denies flatus or BM  Up to chair at this time      Vital Signs:  Patient Vitals for the past 24 hrs:   BP Temp Temp src Pulse Resp SpO2 Height Weight   21 1215 122/73 97.5 °F (36.4 °C) Oral 95 18 98 % -- --   21 0845 119/77 97.6 °F (36.4 °C) Oral 99 18 99 % -- --   21 0545 -- -- -- -- -- -- -- 201 lb 8 oz (91.4 kg)   21 0511 119/61 97.9 °F (36.6 °C) Oral 99 18 94 % -- --   21 2326 122/89 98 °F (36.7 °C) Oral 113 18 95 % -- --   21 (!) 127/57 98.3 °F (36.8 °C) Axillary 112 18 93 % -- --   21 1549 117/72 97.7 °F (36.5 °C) Oral 100 18 94 % -- --   21 1421 -- -- -- -- -- -- 5' 8\" (1.727 m) --      TEMPERATURE HISTORY 24H: Temp (24hrs), Av.8 °F (36.6 °C), Min:97.5 °F (36.4 °C), Max:98.3 °F (36.8 °C)    BLOOD PRESSURE HISTORY: Systolic (06KDW), TUB:630 , Min:98 , SUL:471    Diastolic (57NON), HIO:18, Min:57, Max:89      Intake/Output:  I/O last 3 completed shifts: In: 163 [IV Piggyback:115.6]  Out: 2500 [Urine:2000; Emesis/NG output:500]  No intake/output data recorded.   Drain/tube Output:       Physical Exam:  General: awake, alert, oriented to  person, place, time  Cardiovascular:  regular rate and rhythm and no murmur noted  Lungs: clear to auscultation  Abdomen: soft, nontender, nondistended, bowel sounds normal     Labs:  CBC:    Recent Labs     21  0540 21  0534   WBC 5.7 5.4   HGB 12.0 11.3*   HCT 37.7 35.2*    171     BMP:    Recent Labs     21  0546 21  0546 21  0540 21  2243 21  0534   *  --  136  --  136   K 2.9*   < > 3.1* 3.2* 3.6   CL 94*  --  94*  --  97*   CO2 29  --  33*  --  31   BUN 51*  --  50*  --  42*   CREATININE 1.5*  --  1.4*  --  1.4*   GLUCOSE 144*  --  121*  --  154*    < > = values in this interval not displayed. Hepatic:    Recent Labs     03/02/21  0540 03/03/21  0534   AST 18 18   ALT 15 14   BILITOT 0.9 1.1*   ALKPHOS 155* 153*     Amylase:    Lab Results   Component Value Date    AMYLASE 38 12/07/2015     Lipase:    Lab Results   Component Value Date    LIPASE 35.0 05/10/2020    LIPASE 72.0 04/24/2019    LIPASE 78.0 01/14/2019      Mag:    Lab Results   Component Value Date    MG 2.30 03/03/2021    MG 2.20 03/02/2021     Phos:     Lab Results   Component Value Date    PHOS 3.7 03/03/2021    PHOS 3.7 05/12/2020      Coags:   Lab Results   Component Value Date    PROTIME 22.8 03/03/2021    INR 1.95 03/03/2021    APTT 36.2 03/03/2021       Cultures:  Anaerobic culture  No results found for: LABANAE  Fungus stain  No results found for requested labs within last 30 days. Gram stain  No results found for requested labs within last 30 days. Organism  Lab Results   Component Value Date/Time    ORG Staphylococcus coagulase-negative (A) 10/01/2018 08:55 PM    ORG Staphylococcus coagulase negative DNA Detected (A) 10/01/2018 08:55 PM    ORG Staphylococcus coagulase-negative (A) 10/01/2018 08:55 PM     Surgical culture  No results found for: CXSURG  Blood culture 1  No results found for requested labs within last 30 days. Blood culture 2  No results found for requested labs within last 30 days. Fecal occult  No results found for requested labs within last 30 days. GI bacterial pathogens by PCR  No results found for requested labs within last 30 days. C. difficile  No results found for requested labs within last 30 days. Urine culture  Lab Results   Component Value Date    LABURIN  02/12/2021     <50,000 CFU/ml mixed skin/urogenital art.  No further workup       Pathology:  No relevant pathology     Imaging:  I have personally reviewed the following films:    Ct Abdomen Pelvis Wo Contrast Additional Contrast? Radiologist Recommendation    Result Date: 3/2/2021  EXAMINATION: CT OF THE ABDOMEN AND PELVIS WITHOUT CONTRAST 3/2/2021 1:42 am TECHNIQUE: CT of the abdomen and pelvis was performed without the administration of intravenous contrast. Multiplanar reformatted images are provided for review. Dose modulation, iterative reconstruction, and/or weight based adjustment of the mA/kV was utilized to reduce the radiation dose to as low as reasonably achievable. COMPARISON: 05/13/2020 HISTORY: ORDERING SYSTEM PROVIDED HISTORY: evaluate for pneumoperitoneum, nausea, abdominal distention,  hx pneumatosis, TECHNOLOGIST PROVIDED HISTORY: Reason for exam:->evaluate for pneumoperitoneum, nausea, abdominal distention,  hx pneumatosis, Additional Contrast?->Radiologist Recommendation Reason for Exam: evaluate for pneumoperitoneum, nausea, abdominal distention, hx pneumatosis, Acuity: Acute Type of Exam: Initial Relevant Medical/Surgical History: evaluate for pneumoperitoneum, nausea, abdominal distention,  hx pneumatosis, FINDINGS: Lower Chest: There is bibasilar atelectasis. Organs: The liver, spleen, pancreas, adrenal glands and kidneys are grossly negative given the limitations of the noncontrast technique. GI/Bowel: Small bowel caliber is normal.  Significant diffuse small bowel pneumatosis is again seen. The appendix is normal.  There are diverticula throughout the colon without evidence for diverticulitis. Pelvis: Left adnexal cyst has enlarged and now measures 9.1 cm. The uterus and bladder are grossly negative. Peritoneum/Retroperitoneum: Pneumoperitoneum is identified anteriorly there are multiple thin linear densities throughout the pneumoperitoneum. There is no free fluid or adenopathy. Aortic caliber is normal. Bones/Soft Tissues: No acute findings.      1. Pneumoperitoneum with multiple loculations identified, most likely benign pneumoperitoneum related to rupture of small bowel pneumatosis. 2. Diverticulosis without scan evidence for diverticulitis. 3. Enlarging left ovarian cyst, now measuring 9.1 cm. MRI or surgical evaluation is recommended. Critical results were called by Dr. Severiano Hefty, MD to [facility] on 3/2/2021 at 02:33. Xr Abdomen (kub) (single Ap View)    Result Date: 3/2/2021  EXAMINATION: ONE SUPINE XRAY VIEW(S) OF THE ABDOMEN 3/1/2021 10:13 pm COMPARISON: 03/01/2019 and CT scan from 05/13/2020 HISTORY: ORDERING SYSTEM PROVIDED HISTORY: abdominal pain, nausea, TECHNOLOGIST PROVIDED HISTORY: Reason for exam:->abdominal pain, nausea, Reason for Exam: abdominal pain, nausea Acuity: Unknown Type of Exam: Ongoing Vomiting FINDINGS: There is a large amount of stool throughout the colon. No dilated small bowel is identified. There is a question of gas outlining both walls of bowel in the left mid abdomen. No acute osseous abnormality is seen. Questionable Riegler sign which would suggest the presence of pneumoperitoneum. However, the appearance on the current study may simply represent the known pneumatosis this patient has previously been shown to have. CT scan of the abdomen and pelvis is recommended for further evaluation. Large volume of stool throughout the colon. Critical results were called by Dr. Severiano Hefty, MD to [facility] on 3/2/2021 at 00:26. Us Renal Complete    Result Date: 3/1/2021  EXAMINATION: RETROPERITONEAL ULTRASOUND OF THE KIDNEYS AND URINARY BLADDER 3/1/2021 COMPARISON: None HISTORY: ORDERING SYSTEM PROVIDED HISTORY: dr navarro TECHNOLOGIST PROVIDED HISTORY: Reason for exam:->dr navarro FINDINGS: Kidneys: The right kidney measures 10.0 x 6.6 x 5.4 cm in length and the left kidney measures 9.9 x 5.4 x 4.5 cm in length. Kidneys demonstrate normal cortical echogenicity. No evidence of hydronephrosis or intrarenal stones.  Bladder: Bladder is incompletely distended and otherwise unremarkable in appearance. Unremarkable ultrasound of the kidneys and urinary bladder. Xr Chest Portable    Result Date: 3/2/2021  EXAMINATION: ONE XRAY VIEW OF THE CHEST 3/2/2021 11:38 am COMPARISON: None. HISTORY: ORDERING SYSTEM PROVIDED HISTORY: confirm placement of PICC TECHNOLOGIST PROVIDED HISTORY: Reason for exam:->confirm placement of PICC Reason for Exam: confirm placement of PICC Acuity: Acute Type of Exam: Initial FINDINGS: The lungs are clear. The mediastinum and cardiac silhouette are unremarkable. There is a right-sided PICC line in place, the tip projects over the cavoatrial junction. No acute abnormality. Xr Abdomen For Ng/og/ne Tube Placement    Result Date: 3/2/2021  EXAMINATION: ONE SUPINE XRAY VIEW(S) OF THE ABDOMEN 3/2/2021 1:49 pm COMPARISON: March 1, 2021 HISTORY: ORDERING SYSTEM PROVIDED HISTORY: Confirm tip placement of NG tube TECHNOLOGIST PROVIDED HISTORY: Reason for exam:->Confirm tip placement of NG tube Portable? ->Yes Reason for Exam: NG tube placement Acuity: Unknown Type of Exam: Unknown FINDINGS: Median sternotomy. Left atrial Appendage clip is noted. Patient has had a valvuloplasty. The cardiac silhouette is mildly enlarged. There is a small left pleural effusion and left base opacity. Right PICC catheter tip overlies the right atrium. Enteric catheter tip overlies the body of the stomach. Side hole is distal to the GE junction. Bowel gas pattern is unremarkable. Enteric catheter tip overlies the body of the stomach. Side hole is distal to the GE junction.        Scheduled Meds:   [START ON 3/4/2021] digoxin  125 mcg Intravenous Daily    potassium chloride  20 mEq Intravenous Once    sodium chloride flush  10 mL Intravenous 2 times per day    piperacillin-tazobactam  3,375 mg Intravenous Q8H    sodium chloride flush  10 mL Intravenous 2 times per day    pantoprazole  40 mg Intravenous Daily    insulin glargine  25 Units Subcutaneous QAM    [START ON 3/9/2021] fat emulsion  250 mL Intravenous Once per day on Mon Thu    insulin lispro  0-12 Units Subcutaneous Q4H    exenatide  10 mcg Subcutaneous BID WC    sodium chloride flush  10 mL Intravenous 2 times per day    gabapentin  300 mg Oral Nightly    montelukast  10 mg Oral Nightly     Continuous Infusions:   PN-Adult Premix 5/20 - Standard Electrolytes - Central Line      PN-Adult Premix 5/20 - Standard Electrolytes - Central Line 40 mL/hr at 03/02/21 1845    dextrose 100 mL/hr (03/02/21 1519)     PRN Meds:.bisacodyl, phenol, sodium chloride flush, sodium chloride flush, potassium chloride, morphine, promethazine, sodium chloride flush, promethazine **OR** ondansetron, polyethylene glycol, acetaminophen **OR** acetaminophen, glucose, dextrose, glucagon (rDNA), dextrose, albuterol sulfate HFA, oxyCODONE      Assessment:  Small bowel pneumatosis with loculated pneumoperitoneum  Acute on chronic CHF  CAD, status-post CABG  Acute on chronic kidney disease  Atrial fibrillation, on Coumadin  History of DVT/PE        Plan:  1. Pain improved, vital and labs stable; no plans for surgical intervention at this time, plan to repeat CT in the next 1-2 days  2. NPO with NGT decompression; monitor for bowel function  3. IV hydration as needed with TPN; monitor and correct electrolytes  4. Antibiotics  5. Activity as tolerated, ambulate TID  6. PRN analgesics and antiemetics--minimizing narcotics as tolerated  7. DVT prophylaxis with SCD's; hold Coumadin, okay to bridge with heparin  8. Management of medical comorbid etiologies per primary team and consulting services    EDUCATION:  Educated patient on plan of care and disease process--all questions answered. Plans discussed with patient and nursing. Reviewed and discussed with Dr. Callie Wyman. Signed:  SOLEDAD Dexter - CNP  3/3/2021 1:00 PM    I have personally performed a face to face diagnostic evaluation on this patient.   I have interviewed and examined the patient and I agree with the assessment above. In summary, my findings and plan are the following:   Ms. Jose David Ace is a 77 y.o. female who presents with   Small bowel pneumatosis with loculated pneumoperitoneum  CHF  CAD  Atrial fibrillation  History of DVT/PE  Medical coagulopathy, on coumadin     Plan:  1. Although has pneumatosis of small bowel with loculated pneumoperitoneum, vital signs are stable, lactic acid normal, without peritoneal signs or toxicity. Does not need emergent/urgent surgical exploration unless condition deteriorates. Will likely repeat imaging with PO contrast in 1-2 days for monitoring  2. Bowel rest, NG decompression, continue TPN  3. IVF  4. Pain medication and antiemetics as needed with caution as may mask worsening exam  5. Antibiotics  6. Hold coumadin, may use heparin gtt or lovenox from surgical standpoint if anticoagulation needed  7. Defer management of remainder of medical comorbidities to primary and consulting teams    Douglas B. Marylene Levin MD, FACS  3/3/2021  2:58 PM

## 2021-03-03 NOTE — PROGRESS NOTES
[facility]  HEART FAILURE  Progress Note      Admit Date 2/26/2021     Reason for Consult:      Reason for Consultation/Chief Complaint: SOB    HPI:    Kingston Duran is a 77 y.o. female with PMH HT, HLD, DM, AF s/p Maze 2018, CMP, DVT/ PE, HFrEF admitted from cardiology office with SOB and 13lb wt gain. She was diuresed 7L but was hypotensive and diuretics held. HR elevated and Dig started with some improvement. Pt with nausea and vomiting, abd CT done yesterday and pt NPO with NGT now. Subjective:  Patient is being seen for CHF. There were no acute overnight cardiac events. Today Ms. Sheldon Fernández denies chest pain or palpitations, feels back to baseline with SOB and edema.      Baseline Weight: 198-201   Wt Readings from Last 3 Encounters:   03/03/21 201 lb 8 oz (91.4 kg)   02/26/21 213 lb (96.6 kg)   01/18/21 200 lb (90.7 kg)          Objective:   /77   Pulse 99   Temp 97.6 °F (36.4 °C) (Oral)   Resp 18   Ht 5' 8\" (1.727 m)   Wt 201 lb 8 oz (91.4 kg)   SpO2 99%   BMI 30.64 kg/m²       Intake/Output Summary (Last 24 hours) at 3/3/2021 0947  Last data filed at 3/3/2021 0545  Gross per 24 hour   Intake 162.96 ml   Output 2500 ml   Net -2337.04 ml      Wt Readings from Last 3 Encounters:   03/03/21 201 lb 8 oz (91.4 kg)   02/26/21 213 lb (96.6 kg)   01/18/21 200 lb (90.7 kg)      In: 163   Out: 2500     TELEMETRY: SR/AF    Physical Exam:  General Appearance:  Non-obese/Well Nourished  Respiratory:  · Resp Auscultation: Normal breath sounds without dullness  Cardiovascular:  · Auscultation: irregular,S1S2, +murmur  · Palpation: Normal    · Pedal Pulses: 2+ and equal   Abdomen:  · Soft, NT, ND, + bs  Extremities:  · No Cyanosis or Clubbing  · Extremities: trace pitting  Neurological/Psychiatric:  · Oriented to time, place, and person  · Non-anxious    MEDICATIONS:   Scheduled Meds:   Scheduled Meds:   [START ON 3/4/2021] digoxin  125 mcg Intravenous Daily    sodium chloride flush  10 mL Intravenous 2 times per day    piperacillin-tazobactam  3,375 mg Intravenous Q8H    sodium chloride flush  10 mL Intravenous 2 times per day    pantoprazole  40 mg Intravenous Daily    insulin glargine  25 Units Subcutaneous QAM    [START ON 3/9/2021] fat emulsion  250 mL Intravenous Once per day on Mon Thu    insulin lispro  0-12 Units Subcutaneous Q4H    exenatide  10 mcg Subcutaneous BID WC    sodium chloride flush  10 mL Intravenous 2 times per day    gabapentin  300 mg Oral Nightly    montelukast  10 mg Oral Nightly     Continuous Infusions:   PN-Adult Premix 5/20 - Standard Electrolytes - Central Line 40 mL/hr at 03/02/21 1845    dextrose 100 mL/hr (03/02/21 1519)     PRN Meds:.bisacodyl, phenol, sodium chloride flush, sodium chloride flush, potassium chloride, morphine, promethazine, sodium chloride flush, promethazine **OR** ondansetron, polyethylene glycol, acetaminophen **OR** acetaminophen, glucose, dextrose, glucagon (rDNA), dextrose, albuterol sulfate HFA, oxyCODONE  Continuous Infusions:   PN-Adult Premix 5/20 - Standard Electrolytes - Central Line 40 mL/hr at 03/02/21 1845    dextrose 100 mL/hr (03/02/21 1519)       Intake/Output Summary (Last 24 hours) at 3/3/2021 0947  Last data filed at 3/3/2021 0545  Gross per 24 hour   Intake 162.96 ml   Output 2500 ml   Net -2337.04 ml       Lab Data:  CBC:   Lab Results   Component Value Date    WBC 5.4 03/03/2021    HGB 11.3 03/03/2021     03/03/2021     BMP:  Lab Results   Component Value Date     03/03/2021    K 3.6 03/03/2021    K 3.1 03/02/2021    CL 97 03/03/2021    CO2 31 03/03/2021    BUN 42 03/03/2021    CREATININE 1.4 03/03/2021    GLUCOSE 154 03/03/2021     INR:   Lab Results   Component Value Date    INR 1.95 03/03/2021    INR 1.81 03/02/2021    INR 1.89 03/01/2021        CARDIAC LABS  ENZYMES:No results for input(s): CKMB, CKMBINDEX, TROPONINI in the last 72 hours.     Invalid input(s): CKTOTAL;3  FASTING LIPID PANEL:  Lab Results   Component Value Date    HDL 40 12/23/2020    LDLDIRECT 127 08/21/2018    LDLCALC 97 12/23/2020    TRIG 60 12/23/2020    TSH 2.01 10/21/2020     LIVER PROFILE:  Lab Results   Component Value Date    AST 18 03/03/2021    AST 18 03/02/2021    ALT 14 03/03/2021    ALT 15 03/02/2021     BNP:   Lab Results   Component Value Date    PROBNP 4,179 03/02/2021    PROBNP 5,497 02/26/2021    PROBNP 4,321 12/15/2020     Iron Studies:    Lab Results   Component Value Date    FERRITIN 799.9 10/05/2018    FERRITIN 416.5 08/30/2018     Lab Results   Component Value Date    IRON 49 11/30/2020    TIBC 310 11/30/2020    FERRITIN 799.9 (H) 10/05/2018      Iron Deficiency Anemia:  No    IV Iron Therapy:  No  2017 ACC/AHA HF Guidelines:   intravenous iron replacement in patients with New York Heart Association (NYHA) class II and III HF and iron deficiency(ferritin <100 ng/ml or 100-300 ng/ml if transferrin saturation <20%), to improve functional status and QoL. 1. WEIGHT: Admit Weight: 211 lb (95.7 kg)      Today  Weight: 201 lb 8 oz (91.4 kg)   2.  I/O     Intake/Output Summary (Last 24 hours) at 3/3/2021 0947  Last data filed at 3/3/2021 0545  Gross per 24 hour   Intake 162.96 ml   Output 2500 ml   Net -2337.04 ml       Cardiac Testing:   Dobut ECHO:  11/30/2020   Summary   Dobutamine echocardiogram for aortic stenosis.   Very technically limited exam due to atrial fibrillation.   Baseline echocardiogram shows severe left ventricular dysfunction with   anterior, lateral and apical hypokinesis with an ejection fraction of 20-25   %.   There is no improvement in wall motion with low or high dose dobutamine suggestive of nonviable myocardium.   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 After dobutamine stress the mean AV gradient rises to 20mmHg with 20mcg of dobutamine consistent with mild to moderate aortic stenosis.   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 AHF- compensated, on IVF now, will need to monitor closely for overload  2. ICM- off richard and coreg due to CKD and hypotension, Dig started  3. AF- rate improved, continue Dig, restart BB when taking po  4.  Hypotension - improved, will restart BB when taking po        I appreciate the opportunity of cooperating in the care of this individual.    Fabby Yuan, SHEFALIP, [address] 3/3/2021, 9:47 AM  Heart Failure  [facility], [address]  Ph: [phone]      Core Measures:   · Discharge instructions:   · LVEF documented:   · ACEI for LV dysfunction:   · Smoking Cessation:

## 2021-03-03 NOTE — TELEPHONE ENCOUNTER
states that the hospital doesn't care the pen that he has to take it to the hospital for them to give it to her. He states she only has one pen left.       Please advise

## 2021-03-03 NOTE — PROGRESS NOTES
Clinical Pharmacy Note    Pharmacy consulted by Dr. Salinas Leon to manage TPN    Current TPN rate: 40 ml/hr  Goal TPN rate: 80ml/hr    Labs:  General Labs:  BMP:    Lab Results   Component Value Date     03/03/2021    K 3.6 03/03/2021    K 3.1 03/02/2021    CL 97 03/03/2021    CO2 31 03/03/2021    BUN 42 03/03/2021    LABALBU 2.6 03/03/2021    CREATININE 1.4 03/03/2021    CALCIUM 8.6 03/03/2021    GFRAA 44 03/03/2021    LABGLOM 37 03/03/2021    LABGLOM 45 12/06/2018    GLUCOSE 154 03/03/2021     Blood sugars: 128-227    Electrolyte replacement as follows:   Replace potassium with 20 mEq of potassium chloride administered IV over 1 hour    Blood sugar management:  Plan to continue q4 hour Humalog to medium dose sliding scale. Plan to increase TPN to 80 ml/hr. Thank you for allowing pharmacy to participate in the care of this patient.     Ferny Dominguez, PharmD 3/3/2021

## 2021-03-03 NOTE — PROGRESS NOTES
[facility] PROGRESS NOTE    3/3/2021 1:18 PM        Name: Adelaida Dunbar . Admitted: 2/26/2021  Primary Care Provider: Stoney Cruz MD (Tel: [phone])                        Subjective:  .     Lying in bed abdomen more soft today not passing gas less tender no shortness of breath  Reviewed interval ancillary notes    Current Medications      [START ON 3/4/2021] digoxin (LANOXIN) injection 125 mcg, Daily      potassium chloride 20 mEq/50 mL IVPB (Central Line), Once      PN-Adult Premix 5/20 - Standard Electrolytes - Central Line, Continuous TPN      bisacodyl (DULCOLAX) suppository 10 mg, Daily PRN      phenol 1.4 % mouth spray 1 spray, Q2H PRN      sodium chloride flush 0.9 % injection 10 mL, 2 times per day      sodium chloride flush 0.9 % injection 10 mL, PRN      piperacillin-tazobactam (ZOSYN) 3,375 mg in dextrose 5 % 50 mL IVPB extended infusion (mini-bag), Q8H      sodium chloride flush 0.9 % injection 10 mL, 2 times per day      sodium chloride flush 0.9 % injection 10 mL, PRN      pantoprazole (PROTONIX) injection 40 mg, Daily      potassium chloride 20 mEq/50 mL IVPB (Central Line), PRN      insulin glargine (LANTUS;BASAGLAR) injection pen 25 Units, QAM      PN-Adult Premix 5/20 - Standard Electrolytes - Central Line, Continuous TPN      [START ON 3/9/2021] fat emulsion 20 % infusion 250 mL, Once per day on Mon Thu      morphine (PF) injection 2 mg, Q4H PRN      promethazine (PHENERGAN) injection 12.5 mg, Q6H PRN      insulin lispro (1 Unit Dial) 0-12 Units, Q4H      exenatide (BYETTA) injection 10 mcg -- PATIENT SUPPLIED, BID WC      sodium chloride flush 0.9 % injection 10 mL, 2 times per day      sodium chloride flush 0.9 % injection 10 mL, PRN      promethazine (PHENERGAN) tablet 12.5 mg, Q6H PRN    Or      ondansetron (ZOFRAN) injection 4 mg, Q6H PRN      polyethylene glycol (GLYCOLAX) packet 17 g, Daily PRN      acetaminophen (TYLENOL) tablet 650 mg, Q6H PRN    Or      acetaminophen (TYLENOL) suppository 650 mg, Q6H PRN      glucose (GLUTOSE) 40 % oral gel 15 g, PRN      dextrose 50 % IV solution, PRN      glucagon (rDNA) injection 1 mg, PRN      dextrose 5 % solution, PRN      albuterol sulfate  (90 Base) MCG/ACT inhaler 2 puff, Q6H PRN      gabapentin (NEURONTIN) capsule 300 mg, Nightly      montelukast (SINGULAIR) tablet 10 mg, Nightly      oxyCODONE (ROXICODONE) immediate release tablet 10 mg, Q3H PRN        Objective:  /73   Pulse 95   Temp 97.5 °F (36.4 °C) (Oral)   Resp 18   Ht 5' 8\" (1.727 m)   Wt 201 lb 8 oz (91.4 kg)   SpO2 98%   BMI 30.64 kg/m²     Intake/Output Summary (Last 24 hours) at 3/3/2021 1318  Last data filed at 3/3/2021 0545  Gross per 24 hour   Intake 162.96 ml   Output 2500 ml   Net -2337.04 ml      Wt Readings from Last 3 Encounters:   03/03/21 201 lb 8 oz (91.4 kg)   02/26/21 213 lb (96.6 kg)   01/18/21 200 lb (90.7 kg)       General appearance:  Appears comfortable  Eyes: Sclera clear. Pupils equal.  ENT: Moist oral mucosa. Trachea midline  Cardiovascular: Irregularly irregular no murmurs appreciated  Respiratory: mild bibasilar crackles no wheezing  GI: Soft slight diffuse tenderness to palpation improved no guarding no rigidity bowel sounds positive    Neurology: no gross focal deficits  Ext: trace edema  Psych: Normal affect.  Alert and oriented in time, place and person  Skin: Warm, dry, normal turgor    Labs and Tests:  CBC:   Recent Labs     03/02/21  0540 03/03/21  0534   WBC 5.7 5.4   HGB 12.0 11.3*    171     BMP:    Recent Labs     03/01/21  0546 03/01/21  0546 03/02/21  0540 03/02/21  2243 03/03/21  0534   *  --  136  --  136   K 2.9*   < > 3.1* 3.2* 3.6   CL 94*  --  94*  --  97*   CO2 29  --  33*  --  31   BUN 51*  --  50*  --  42*   CREATININE 1.5*  -- 1.4*  --  1.4*   GLUCOSE 144*  --  121*  --  154*    < > = values in this interval not displayed. Hepatic:   Recent Labs     03/02/21  0540 03/03/21  0534   AST 18 18   ALT 15 14   BILITOT 0.9 1.1*   ALKPHOS 155* 153*       Discussed care with family and patient             Spent 30  minutes with patient and family at bedside and on unit reviewing medical records and labs, spent greater than 50% time counseling patient and family on diagnosis and plan   Problem List  Active Problems:    Pneumoperitoneum    Pneumatosis intestinalis of small intestine    CHF (congestive heart failure), NYHA class I, acute on chronic, combined ([facility] [address]    Stage 3 chronic kidney disease    Coronary artery disease involving native heart without angina pectoris  Resolved Problems:    * No resolved hospital problems. *       Assessment & Plan:   76-year-old female who was admitted for weight gain of 13 pounds found to have acute on chronic CHF exacerbation. Patient has been diuresing with IV Lasix here. Found to have bump in creatinine with renal failure with creatinine of 1.5 cardiology and nephrology has been following.       Small bowel pneumatosis with loculated pneumoperitoneum  - npo  - iv atbx  -tpn  - ng tube for decompression  - surgery on board    Acute on chronic systolic CHF  - diuretics per nephro      Hypokalemia  - improved    TORIBIO on CKD3:   - istable    Chronic A. Fib  - continue meds, cards on board  - coumadin on hold, iheparing tt        Diet: Diet NPO Effective Now  PN-Adult Premix 5/20 - Standard Electrolytes - Central Line  PN-Adult Premix 5/20 - Standard Electrolytes - Central Line  Code:Full Code    Deja Fleming MD   3/3/2021 1:17 PM

## 2021-03-03 NOTE — PROGRESS NOTES
Nephrology Progress  Note  [phone]   [email or web address]        Reason for Consult: TORIBIO/ CKD / Edema   The patient is a 77 y.o. female with significant past medical history of Coronary  artery disease s/p CABG, congestive heart failure , chronic kidney disease stage IIIa , atrial fibrillation , T2DM  hyperlipidemia , hypertension who presented to the ER with progressive swelling of her legs and ankles for the last week. She says she has not been measuring her daily weights, but she says she has been watching her salt intake and taking her prescribed diuretics. She has an aching pain over her left kidney area however there is no dysuria or gross hematuria. There is no fever, cough hemoptysis  She has no nausea vomiting diarrhea  Prior to admission she was taking diuretics Demadex with metolazone and spironolactone. She is not on NSAIDs or ACE inhibitor or ARB  There is no skin new skin rash or arthralgias      This morning abdominal pain   CT abdomen and Pelvis ( no contrast )  1. Pneumoperitoneum with multiple loculations identified, most likely benign   pneumoperitoneum related to rupture of small bowel pneumatosis. 2. Diverticulosis without scan evidence for diverticulitis. 3. Enlarging left ovarian cyst, now measuring 9.1 cm. Interval History :  Continues to be NPO , NG suction - 600 ml +  On TPN    by bedside  No passage of gas/ bowel movement       PHYSICAL EXAM:    Vitals:    /77   Pulse 99   Temp 97.6 °F (36.4 °C) (Oral)   Resp 18   Ht 5' 8\" (1.727 m)   Wt 201 lb 8 oz (91.4 kg)   SpO2 99%   BMI 30.64 kg/m²   I/O last 3 completed shifts: In: 163 [IV Piggyback:115.6]  Out: 2500 [Urine:2000; Emesis/NG output:500]  No intake/output data recorded. Physical Exam:  Gen:  alert, oriented x 3, dyspnea+  NG Tube   PERRL , EOM +  Pallor +, No icterus  JVP  raised   CV: RR , tachycardia , ESM Gr 3/6 , No  rub . Mid sternal scar of CABG   Lungs:B/ L air entry, Normal breath sounds   Abd: distended, tender scars+  Ext: 1+ leg and ankle  edema, no cyanosis  Skin: Warm. No rash  Neuro: nonfocal.      DATA:    CBC with Differential:    Lab Results   Component Value Date    WBC 5.4 03/03/2021    RBC 4.11 03/03/2021    HGB 11.3 03/03/2021    HCT 35.2 03/03/2021     03/03/2021    MCV 85.7 03/03/2021    MCH 27.5 03/03/2021    MCHC 32.0 03/03/2021    RDW 16.8 03/03/2021    SEGSPCT 64.1 09/02/2020    BANDSPCT 1 01/14/2019    LYMPHOPCT 12.7 03/03/2021    MONOPCT 12.3 03/03/2021    BASOPCT 0.5 03/03/2021    MONOSABS 0.7 03/03/2021    LYMPHSABS 0.7 03/03/2021    EOSABS 0.3 03/03/2021    BASOSABS 0.0 03/03/2021     CMP:    Lab Results   Component Value Date     03/03/2021    K 3.6 03/03/2021    K 3.1 03/02/2021    CL 97 03/03/2021    CO2 31 03/03/2021    BUN 42 03/03/2021    CREATININE 1.4 03/03/2021    GFRAA 44 03/03/2021    AGRATIO 1.0 03/03/2021    LABGLOM 37 03/03/2021    LABGLOM 45 12/06/2018    GLUCOSE 154 03/03/2021    PROT 5.3 03/03/2021    LABALBU 2.6 03/03/2021    CALCIUM 8.6 03/03/2021    BILITOT 1.1 03/03/2021    ALKPHOS 153 03/03/2021    AST 18 03/03/2021    ALT 14 03/03/2021     Phosphorus:    Lab Results   Component Value Date    PHOS 3.7 03/03/2021     Troponin:    Lab Results   Component Value Date    TROPONINI <0.01 05/10/2020     U/A:    Lab Results   Component Value Date    COLORU YELLOW 02/28/2021    PROTEINU Negative 02/28/2021    PHUR 7.0 02/28/2021    WBCUA 31 05/11/2020    RBCUA neg 02/12/2021    RBCUA 1 05/11/2020    YEAST neg 02/12/2021    BACTERIA trace 02/12/2021    BACTERIA 1+ 05/11/2020    CLARITYU Clear 02/28/2021    SPECGRAV 1.010 02/28/2021    LEUKOCYTESUR Negative 02/28/2021    UROBILINOGEN 1.0 02/28/2021    BILIRUBINUR Negative 02/28/2021    BLOODU Negative 02/28/2021    GLUCOSEU Negative 02/28/2021           IMPRESSION/RECOMMENDATIONS:      1. TORIBIO  : Secondary to congestive heart failure and its treatment.   Diuretics are on hold however feel that she will need them. FeNa> 1 , indicates ATN   Cr 1.4, stable       2. CHF  euvolemic       3. Hypokalemia   Sec to diuretics , K  Corrected to 3.6       4. CAD     5. CKD stage 3a   Baseline Cr 1.3, eGFR 40 , No proteinuria    check Urine    Ultrasound (2018 )   right kidney measures 11.5 cm in length and the left kidney measures 11.6   cm in length   Ultrasound 3/2021:  The right kidney measures 10.0 x 6.6 x 5.4 cm in length and the left kidney   measures 9.9 x 5.4 x 4.5 cm in length.       Kidneys demonstrate normal cortical echogenicity.  No evidence of   hydronephrosis or intrarenal stones. 6. PneumoPeritoneun   For conservative treatment at this time     Thank you for allowing me to participate in the care of this patient. I will continue to follow along. Please call with questions.     Juanita Ford MD  3/3/2021  [facility]

## 2021-03-03 NOTE — PLAN OF CARE
Problem: OXYGENATION/RESPIRATORY FUNCTION  Goal: Patient will maintain patent airway  3/2/2021 2002 by Isai Barrera RN  Outcome: Ongoing  Goal: Patient will achieve/maintain normal respiratory rate/effort  Description: Respiratory rate and effort will be within normal limits for the patient  3/3/2021 0547 by Pablo Patel RN  Outcome: Ongoing  3/2/2021 2002 by Isai Barrera RN  Outcome: Ongoing     Problem: HEMODYNAMIC STATUS  Goal: Patient has stable vital signs and fluid balance  3/3/2021 0547 by Pablo Patel RN  Outcome: Ongoing  3/2/2021 2002 by Isai Barrera RN  Outcome: Ongoing     Problem: FLUID AND ELECTROLYTE IMBALANCE  Goal: Fluid and electrolyte balance are achieved/maintained  3/3/2021 0547 by Pablo Patel RN  Outcome: Ongoing  3/2/2021 2002 by Isai Barrera RN  Outcome: Ongoing     Problem: ACTIVITY INTOLERANCE/IMPAIRED MOBILITY  Goal: Mobility/activity is maintained at optimum level for patient  3/3/2021 0547 by Pablo Patel RN  Outcome: Ongoing  3/2/2021 2002 by Isai Barrera RN  Outcome: Ongoing     Problem: Pain:  Goal: Pain level will decrease  Description: Pain level will decrease  3/3/2021 0547 by Pablo Patel RN  Outcome: Ongoing  3/2/2021 2002 by Isai Barrera RN  Outcome: Ongoing  Goal: Control of acute pain  Description: Control of acute pain  3/2/2021 2002 by Isai Barrera RN  Outcome: Ongoing  Goal: Control of chronic pain  Description: Control of chronic pain  3/2/2021 2002 by Isai Barrera RN  Outcome: Ongoing     Problem: Nausea/Vomiting:  Goal: Absence of nausea/vomiting  Description: Absence of nausea/vomiting  3/3/2021 0547 by Pablo Patel RN  Outcome: Ongoing  3/2/2021 2002 by Isai Barrera RN  Outcome: Ongoing  Goal: Able to drink  Description: Able to drink  3/2/2021 2002 by Isai Barrera RN  Outcome: Ongoing  Goal: Able to eat  Description: Able to eat  3/2/2021 2002 by Isai Barrera RN  Outcome: Ongoing  Goal: Ability to achieve adequate nutritional intake will improve  Description: Ability to achieve adequate nutritional intake will improve  3/2/2021 2002 by Fortunato Mas RN  Outcome: Ongoing     Problem: Nutrition  Goal: Optimal nutrition therapy  3/3/2021 0547 by Carmen Sanders RN  Outcome: Ongoing  3/2/2021 2002 by Fortunato Mas RN  Outcome: Ongoing

## 2021-03-03 NOTE — PLAN OF CARE
Problem: OXYGENATION/RESPIRATORY FUNCTION  Goal: Patient will maintain patent airway  Outcome: Ongoing  Goal: Patient will achieve/maintain normal respiratory rate/effort  Description: Respiratory rate and effort will be within normal limits for the patient  Outcome: Ongoing     Problem: HEMODYNAMIC STATUS  Goal: Patient has stable vital signs and fluid balance  Outcome: Ongoing     Problem: FLUID AND ELECTROLYTE IMBALANCE  Goal: Fluid and electrolyte balance are achieved/maintained  Outcome: Ongoing     Problem: ACTIVITY INTOLERANCE/IMPAIRED MOBILITY  Goal: Mobility/activity is maintained at optimum level for patient  Outcome: Ongoing     Problem: Pain:  Goal: Pain level will decrease  Description: Pain level will decrease  Outcome: Ongoing  Goal: Control of acute pain  Description: Control of acute pain  Outcome: Ongoing  Goal: Control of chronic pain  Description: Control of chronic pain  Outcome: Ongoing     Problem: Nausea/Vomiting:  Goal: Absence of nausea/vomiting  Description: Absence of nausea/vomiting  Outcome: Ongoing  Goal: Able to drink  Description: Able to drink  Outcome: Ongoing  Goal: Able to eat  Description: Able to eat  Outcome: Ongoing  Goal: Ability to achieve adequate nutritional intake will improve  Description: Ability to achieve adequate nutritional intake will improve  Outcome: Ongoing     Problem: Nutrition  Goal: Optimal nutrition therapy  Outcome: Ongoing

## 2021-03-04 ENCOUNTER — APPOINTMENT (OUTPATIENT)
Dept: CT IMAGING | Age: 76
DRG: 393 | End: 2021-03-04
Attending: INTERNAL MEDICINE
Payer: MEDICARE

## 2021-03-04 LAB
ANION GAP SERPL CALCULATED.3IONS-SCNC: 6 MMOL/L (ref 3–16)
BASOPHILS ABSOLUTE: 0 K/UL (ref 0–0.2)
BASOPHILS ABSOLUTE: 0 K/UL (ref 0–0.2)
BASOPHILS RELATIVE PERCENT: 0.4 %
BASOPHILS RELATIVE PERCENT: 0.5 %
BUN BLDV-MCNC: 33 MG/DL (ref 7–20)
CALCIUM SERPL-MCNC: 8.6 MG/DL (ref 8.3–10.6)
CHLORIDE BLD-SCNC: 99 MMOL/L (ref 99–110)
CO2: 32 MMOL/L (ref 21–32)
CREAT SERPL-MCNC: 1.1 MG/DL (ref 0.6–1.2)
EOSINOPHILS ABSOLUTE: 0.3 K/UL (ref 0–0.6)
EOSINOPHILS ABSOLUTE: 0.4 K/UL (ref 0–0.6)
EOSINOPHILS RELATIVE PERCENT: 5.9 %
EOSINOPHILS RELATIVE PERCENT: 6.8 %
GFR AFRICAN AMERICAN: 59
GFR NON-AFRICAN AMERICAN: 49
GLUCOSE BLD-MCNC: 134 MG/DL (ref 70–99)
GLUCOSE BLD-MCNC: 183 MG/DL (ref 70–99)
GLUCOSE BLD-MCNC: 192 MG/DL (ref 70–99)
GLUCOSE BLD-MCNC: 194 MG/DL (ref 70–99)
GLUCOSE BLD-MCNC: 199 MG/DL (ref 70–99)
GLUCOSE BLD-MCNC: 204 MG/DL (ref 70–99)
GLUCOSE BLD-MCNC: 212 MG/DL (ref 70–99)
HCT VFR BLD CALC: 35 % (ref 36–48)
HCT VFR BLD CALC: 35.7 % (ref 36–48)
HEMOGLOBIN: 11.2 G/DL (ref 12–16)
HEMOGLOBIN: 11.4 G/DL (ref 12–16)
INR BLD: 1.92 (ref 0.86–1.14)
LACTIC ACID: 1.2 MMOL/L (ref 0.4–2)
LYMPHOCYTES ABSOLUTE: 0.7 K/UL (ref 1–5.1)
LYMPHOCYTES ABSOLUTE: 0.7 K/UL (ref 1–5.1)
LYMPHOCYTES RELATIVE PERCENT: 11.3 %
LYMPHOCYTES RELATIVE PERCENT: 13.3 %
MAGNESIUM: 2.4 MG/DL (ref 1.8–2.4)
MCH RBC QN AUTO: 27.7 PG (ref 26–34)
MCH RBC QN AUTO: 27.9 PG (ref 26–34)
MCHC RBC AUTO-ENTMCNC: 31.9 G/DL (ref 31–36)
MCHC RBC AUTO-ENTMCNC: 32 G/DL (ref 31–36)
MCV RBC AUTO: 86.5 FL (ref 80–100)
MCV RBC AUTO: 87.5 FL (ref 80–100)
MONOCYTES ABSOLUTE: 0.5 K/UL (ref 0–1.3)
MONOCYTES ABSOLUTE: 0.6 K/UL (ref 0–1.3)
MONOCYTES RELATIVE PERCENT: 10.6 %
MONOCYTES RELATIVE PERCENT: 8.9 %
NEUTROPHILS ABSOLUTE: 3.8 K/UL (ref 1.7–7.7)
NEUTROPHILS ABSOLUTE: 4.3 K/UL (ref 1.7–7.7)
NEUTROPHILS RELATIVE PERCENT: 68.8 %
NEUTROPHILS RELATIVE PERCENT: 73.5 %
PDW BLD-RTO: 16.7 % (ref 12.4–15.4)
PDW BLD-RTO: 16.8 % (ref 12.4–15.4)
PERFORMED ON: ABNORMAL
PHOSPHORUS: 3.4 MG/DL (ref 2.5–4.9)
PLATELET # BLD: 152 K/UL (ref 135–450)
PLATELET # BLD: 155 K/UL (ref 135–450)
PMV BLD AUTO: 7.8 FL (ref 5–10.5)
PMV BLD AUTO: 8.1 FL (ref 5–10.5)
POTASSIUM SERPL-SCNC: 3.5 MMOL/L (ref 3.5–5.1)
PROTHROMBIN TIME: 22.4 SEC (ref 10–13.2)
RBC # BLD: 4.05 M/UL (ref 4–5.2)
RBC # BLD: 4.08 M/UL (ref 4–5.2)
SODIUM BLD-SCNC: 137 MMOL/L (ref 136–145)
WBC # BLD: 5.5 K/UL (ref 4–11)
WBC # BLD: 5.8 K/UL (ref 4–11)

## 2021-03-04 PROCEDURE — 6360000004 HC RX CONTRAST MEDICATION: Performed by: SURGERY

## 2021-03-04 PROCEDURE — 2500000003 HC RX 250 WO HCPCS: Performed by: INTERNAL MEDICINE

## 2021-03-04 PROCEDURE — 97116 GAIT TRAINING THERAPY: CPT

## 2021-03-04 PROCEDURE — 36415 COLL VENOUS BLD VENIPUNCTURE: CPT

## 2021-03-04 PROCEDURE — 6360000002 HC RX W HCPCS: Performed by: NURSE PRACTITIONER

## 2021-03-04 PROCEDURE — 6370000000 HC RX 637 (ALT 250 FOR IP): Performed by: INTERNAL MEDICINE

## 2021-03-04 PROCEDURE — 85610 PROTHROMBIN TIME: CPT

## 2021-03-04 PROCEDURE — 99232 SBSQ HOSP IP/OBS MODERATE 35: CPT | Performed by: SURGERY

## 2021-03-04 PROCEDURE — 99232 SBSQ HOSP IP/OBS MODERATE 35: CPT | Performed by: NURSE PRACTITIONER

## 2021-03-04 PROCEDURE — 80048 BASIC METABOLIC PNL TOTAL CA: CPT

## 2021-03-04 PROCEDURE — 6360000002 HC RX W HCPCS: Performed by: INTERNAL MEDICINE

## 2021-03-04 PROCEDURE — 97161 PT EVAL LOW COMPLEX 20 MIN: CPT

## 2021-03-04 PROCEDURE — 6360000002 HC RX W HCPCS: Performed by: SURGERY

## 2021-03-04 PROCEDURE — 1200000000 HC SEMI PRIVATE

## 2021-03-04 PROCEDURE — C9113 INJ PANTOPRAZOLE SODIUM, VIA: HCPCS | Performed by: INTERNAL MEDICINE

## 2021-03-04 PROCEDURE — 83605 ASSAY OF LACTIC ACID: CPT

## 2021-03-04 PROCEDURE — 84100 ASSAY OF PHOSPHORUS: CPT

## 2021-03-04 PROCEDURE — 83735 ASSAY OF MAGNESIUM: CPT

## 2021-03-04 PROCEDURE — 2580000003 HC RX 258: Performed by: INTERNAL MEDICINE

## 2021-03-04 PROCEDURE — 85025 COMPLETE CBC W/AUTO DIFF WBC: CPT

## 2021-03-04 PROCEDURE — 74176 CT ABD & PELVIS W/O CONTRAST: CPT

## 2021-03-04 PROCEDURE — 2580000003 HC RX 258: Performed by: NURSE PRACTITIONER

## 2021-03-04 RX ORDER — INSULIN LISPRO 100 [IU]/ML
0-18 INJECTION, SOLUTION INTRAVENOUS; SUBCUTANEOUS EVERY 4 HOURS
Status: DISCONTINUED | OUTPATIENT
Start: 2021-03-04 | End: 2021-03-10

## 2021-03-04 RX ORDER — POTASSIUM CHLORIDE 29.8 MG/ML
20 INJECTION INTRAVENOUS ONCE
Status: COMPLETED | OUTPATIENT
Start: 2021-03-04 | End: 2021-03-04

## 2021-03-04 RX ORDER — PANTOPRAZOLE SODIUM 40 MG/10ML
40 INJECTION, POWDER, LYOPHILIZED, FOR SOLUTION INTRAVENOUS 2 TIMES DAILY
Status: DISCONTINUED | OUTPATIENT
Start: 2021-03-05 | End: 2021-03-10

## 2021-03-04 RX ORDER — FUROSEMIDE 10 MG/ML
20 INJECTION INTRAMUSCULAR; INTRAVENOUS DAILY
Status: DISCONTINUED | OUTPATIENT
Start: 2021-03-04 | End: 2021-03-05

## 2021-03-04 RX ADMIN — DIGOXIN 125 MCG: 0.25 INJECTION INTRAMUSCULAR; INTRAVENOUS at 08:12

## 2021-03-04 RX ADMIN — PIPERACILLIN AND TAZOBACTAM 3375 MG: 3; .375 INJECTION, POWDER, LYOPHILIZED, FOR SOLUTION INTRAVENOUS at 01:59

## 2021-03-04 RX ADMIN — MORPHINE SULFATE 2 MG: 2 INJECTION, SOLUTION INTRAMUSCULAR; INTRAVENOUS at 07:19

## 2021-03-04 RX ADMIN — INSULIN LISPRO 3 UNITS: 100 INJECTION, SOLUTION INTRAVENOUS; SUBCUTANEOUS at 11:42

## 2021-03-04 RX ADMIN — Medication 10 ML: at 08:15

## 2021-03-04 RX ADMIN — LEUCINE, PHENYLALANINE, LYSINE, METHIONINE, ISOLEUCINE, VALINE, HISTIDINE, THREONINE, TRYPTOPHAN, ALANINE, GLYCINE, ARGININE, PROLINE, SERINE, TYROSINE, SODIUM ACETATE, DIBASIC POTASSIUM PHOSPHATE, MAGNESIUM CHLORIDE, SODIUM CHLORIDE, CALCIUM CHLORIDE, DEXTROSE
365; 280; 290; 200; 300; 290; 240; 210; 90; 1035; 515; 575; 340; 250; 20; 340; 261; 51; 59; 33; 20 INJECTION INTRAVENOUS at 17:21

## 2021-03-04 RX ADMIN — MORPHINE SULFATE 2 MG: 2 INJECTION, SOLUTION INTRAMUSCULAR; INTRAVENOUS at 17:19

## 2021-03-04 RX ADMIN — IOHEXOL 50 ML: 240 INJECTION, SOLUTION INTRATHECAL; INTRAVASCULAR; INTRAVENOUS; ORAL at 12:13

## 2021-03-04 RX ADMIN — GABAPENTIN 300 MG: 300 CAPSULE ORAL at 20:55

## 2021-03-04 RX ADMIN — MORPHINE SULFATE 2 MG: 2 INJECTION, SOLUTION INTRAMUSCULAR; INTRAVENOUS at 00:12

## 2021-03-04 RX ADMIN — MORPHINE SULFATE 2 MG: 2 INJECTION, SOLUTION INTRAMUSCULAR; INTRAVENOUS at 12:57

## 2021-03-04 RX ADMIN — INSULIN LISPRO 6 UNITS: 100 INJECTION, SOLUTION INTRAVENOUS; SUBCUTANEOUS at 08:34

## 2021-03-04 RX ADMIN — POTASSIUM CHLORIDE 20 MEQ: 29.8 INJECTION, SOLUTION INTRAVENOUS at 08:25

## 2021-03-04 RX ADMIN — ENOXAPARIN SODIUM 90 MG: 100 INJECTION SUBCUTANEOUS at 08:14

## 2021-03-04 RX ADMIN — FUROSEMIDE 20 MG: 10 INJECTION, SOLUTION INTRAMUSCULAR; INTRAVENOUS at 11:36

## 2021-03-04 RX ADMIN — PIPERACILLIN AND TAZOBACTAM 3375 MG: 3; .375 INJECTION, POWDER, LYOPHILIZED, FOR SOLUTION INTRAVENOUS at 08:21

## 2021-03-04 RX ADMIN — INSULIN LISPRO 2 UNITS: 100 INJECTION, SOLUTION INTRAVENOUS; SUBCUTANEOUS at 00:12

## 2021-03-04 RX ADMIN — INSULIN LISPRO 6 UNITS: 100 INJECTION, SOLUTION INTRAVENOUS; SUBCUTANEOUS at 21:01

## 2021-03-04 RX ADMIN — PANTOPRAZOLE SODIUM 40 MG: 40 INJECTION, POWDER, FOR SOLUTION INTRAVENOUS at 08:14

## 2021-03-04 RX ADMIN — PIPERACILLIN AND TAZOBACTAM 3375 MG: 3; .375 INJECTION, POWDER, LYOPHILIZED, FOR SOLUTION INTRAVENOUS at 16:19

## 2021-03-04 RX ADMIN — Medication 10 ML: at 20:56

## 2021-03-04 RX ADMIN — INSULIN LISPRO 2 UNITS: 100 INJECTION, SOLUTION INTRAVENOUS; SUBCUTANEOUS at 05:06

## 2021-03-04 ASSESSMENT — PAIN DESCRIPTION - ORIENTATION: ORIENTATION: MID

## 2021-03-04 ASSESSMENT — PAIN SCALES - GENERAL
PAINLEVEL_OUTOF10: 8
PAINLEVEL_OUTOF10: 5
PAINLEVEL_OUTOF10: 8
PAINLEVEL_OUTOF10: 8
PAINLEVEL_OUTOF10: 7
PAINLEVEL_OUTOF10: 7
PAINLEVEL_OUTOF10: 9

## 2021-03-04 ASSESSMENT — PAIN DESCRIPTION - PAIN TYPE
TYPE: ACUTE PAIN
TYPE: ACUTE PAIN

## 2021-03-04 ASSESSMENT — PAIN DESCRIPTION - LOCATION: LOCATION: BACK

## 2021-03-04 NOTE — PROGRESS NOTES
Comprehensive Nutrition Assessment    Type and Reason for Visit:  Reassess    Nutrition Recommendations/Plan:   1. Continue TPN at current rate of 80 ml/hr  2. Continue to monitor labs for tolerance    Nutrition Assessment:  Pt is nutritionally stable at this time. Pt is on Clinimix 5/20 @ 80 ml/hr. Currently pt's nutrient needs are being met. Pt has NG tube for decompression. Pt has experienced some nausea but no vomiting. Lytes are WNL. Gluc 192H. RD to continue to monitor. Malnutrition Assessment:  Malnutrition Status:  No malnutrition      Estimated Daily Nutrient Needs:  Energy (kcal):  8640 - 1674; Weight Used for Energy Requirements:  (Continue using 93kg)     Protein (g):  77 - 128; Weight Used for Protein Requirements:  (1.2 - 2.0g/64kg)        Fluid (ml/day):   ; Method Used for Fluid Requirements:  1 ml/kcal      Nutrition Related Findings:  +2 BLE. I/O's: -7.5L      Wounds:  None       Current Nutrition Therapies:    Diet NPO Effective Now  PN-Adult Premix 5/20 - Standard Electrolytes - Central Line  PN-Adult Premix 5/20 - Standard Electrolytes - Central Line  Current Parenteral Nutrition Orders:  · Type and Formula: Premix Central   · Lipids: 250ml  · Duration: Continuous  · Rate/Volume: 80 ml/hr (1920 ml)  · Current PN Order Provides: Clinimix 5/20 @ 80 ml/hr. Provides: 1920 ml, 1960 cals and 96 gms protein. GIR 2.9  · Goal PN Orders Provides: Same as above      Anthropometric Measures:  · Height: 5' 8\" (172.7 cm)  · Current Body Weight: 204 lb (92.5 kg)   · Admission Body Weight: 209 lb (94.8 kg)    · Ideal Body Weight: 140 lbs; % Ideal Body Weight 145.7 %   · BMI: 31  · BMI Categories: Obese Class 1 (BMI 30.0-34. 9)       Nutrition Diagnosis:   · Inadequate oral intake related to altered GI function as evidenced by NPO or clear liquid status due to medical condition      Nutrition Interventions:   Food and/or Nutrient Delivery:  Continue Current Parenteral Nutrition  Nutrition Education/Counseling:  Education completed   Coordination of Nutrition Care:  Continue to monitor while inpatient    Goals:  Pt continue to tolerate TPN at goal rate during LOS       Nutrition Monitoring and Evaluation:   Behavioral-Environmental Outcomes:  None Identified   Food/Nutrient Intake Outcomes:  Parenteral Nutrition Intake/Tolerance  Physical Signs/Symptoms Outcomes:  Biochemical Data, Weight, Fluid Status or Edema     Discharge Planning:     Too soon to determine     Electronically signed by Darrius Tate RD, CNSC, LD on 3/4/21 at 2:20 PM EST    Contact: [phone]

## 2021-03-04 NOTE — PLAN OF CARE
Nutrition Problem #1: Inadequate oral intake  Intervention: Food and/or Nutrient Delivery: Continue Current Parenteral Nutrition  Nutritional Goals: Pt continue to tolerate TPN at goal rate during LOS Spontaneous, unlabored and symmetrical

## 2021-03-04 NOTE — PROGRESS NOTES
Physical Therapy    Facility/Department: [facility] 3A NURSING  Initial Assessment    NAME: Shola Patel  : 1945  MRN: [id number]    Date of Service: 3/4/2021    Discharge Recommendations:Blanca August scored a 20/24 on the AM-PAC short mobility form. At this time, no further PT is recommended upon discharge due to functioning near independence, anticipated return to independence with medical status improvement. Recommend patient returns to prior setting with prior services. PT Equipment Recommendations  Equipment Needed: No    Assessment   Body structures, Functions, Activity limitations: Decreased functional mobility ; Decreased ADL status; Decreased endurance;Decreased balance; Increased pain  Assessment: Pt presents as slightly below her baseline function, limited primarily by attachment to NG tube and IV lines moreso than physical limitations. Pt would benefit from skilled acute PT services while in house to promote safe return to PLOF. Prognosis: Excellent  Decision Making: Low Complexity  PT Education: Goals;PT Role;Plan of Care  REQUIRES PT FOLLOW UP: Yes  Activity Tolerance  Activity Tolerance: Patient Tolerated treatment well       Patient Diagnosis(es): There were no encounter diagnoses. has a past medical history of Asthma, Atrial fibrillation ([address] Eosinophilia, Hemoptysis, HIGH CHOLESTEROL, Hx of blood clots, Hypertension, Irregular heart beat, Other specified gastritis without mention of hemorrhage, Palpitations, Skin cancer, and Type II or unspecified type diabetes mellitus without mention of complication, not stated as uncontrolled. has a past surgical history that includes Cholecystectomy;  section; Colonoscopy (2017); skin biopsy; bronchoscopy (2016); Coronary artery bypass graft (2018); Mitral valve replacement (2018); transesophageal echocardiogram (2018); Tunneled venous catheter placement (Left, 2018);  Cardiac catheterization (08/16/2018); Insertable Cardiac Monitor (Left, 08/16/2018); Colonoscopy (01/17/2014); Hysterectomy; Upper gastrointestinal endoscopy (N/A, 10/10/2018); Colonoscopy (10/10/2018); Colonoscopy (N/A, 8/7/2020); Pain management procedure (N/A, 12/18/2020); and Pain management procedure (Bilateral, 1/18/2021). Restrictions  Restrictions/Precautions  Restrictions/Precautions: Fall Risk(Medium)  Required Braces or Orthoses?: No  Position Activity Restriction  Other position/activity restrictions: Liz Miller is a 77 y.o. female who presented from cardiology office. Patient was seen in the cardiology because of worsening shortness of breath and weight gain. She did gain about 12 to 13 pounds in a week's time. She does mention that she has been compliant with her medications but not so compliant with her salt intake. She denies any recent chest pain palpitations.   She denies any fever cough phlegm diarrhea constipation trouble pain burning sensation while peeing one-sided weakness  Vision/Hearing  Vision: Impaired  Vision Exceptions: Wears glasses for reading  Hearing: Within functional limits     Subjective  General  Chart Reviewed: Yes  Response To Previous Treatment: Not applicable  Family / Caregiver Present: No  Diagnosis: CHF acute on chronic, pneuomatosis  Follows Commands: Within Functional Limits  General Comment  Comments: Pt supine in bed upon arrival.  Subjective  Subjective: Pt agreeable to PT/OT eval.  Pain Screening  Patient Currently in Pain: Yes          Orientation  Orientation  Overall Orientation Status: Within Normal Limits  Social/Functional History  Social/Functional History  Lives With: Spouse  Type of Home: House  Home Layout: One level, Laundry in basement, Able to Live on Main level with bedroom/bathroom, Performs ADL's on one level  Home Access: Stairs to enter with rails  Entrance Stairs - Number of Steps: 2 HARLEY  Entrance Stairs - Rails: Both  Bathroom Shower/Tub: Tub/Shower unit, Shower chair without back  [employer]: Handicap height  Bathroom Equipment: Shower chair, Hand-held shower  ADL Assistance: Independent(Pt and  endorse independence with ADL's but state  assists to reduce workload on pt. \"I wash her back, but she does the rest\")  Homemaking Assistance: ( has primarily been completing due to CHF)  Homemaking Responsibilities: No  Ambulation Assistance: Independent  Transfer Assistance: Independent  Active : Yes  Leisure & Hobbies: Reading, watching TV  Additional Comments: Pt denies recent falls  Cognition        Objective     Observation/Palpation  Posture: Good  Observation: NG tube present    AROM RLE (degrees)  RLE AROM: WNL  AROM LLE (degrees)  LLE AROM : WNL  Strength RLE  Strength RLE: WFL  Strength LLE  Strength LLE: WFL  Tone RLE  RLE Tone: Normotonic  Tone LLE  LLE Tone: Normotonic  Motor Control  Gross Motor?: WFL  Sensation  Overall Sensation Status: WFL  Bed mobility  Supine to Sit: Minimal assistance(pt reaching out for 's hand, appears to come to stand without much assistance)  Scooting: Contact guard assistance  Transfers  Sit to Stand: Stand by assistance  Stand to sit: Stand by assistance  Ambulation  Ambulation?: Yes  Ambulation 1  Surface: level tile  Device: No Device  Assistance: Stand by assistance  Quality of Gait: Pt ambulates with decreased step length, slow obie, mild forward flexed posture, no LOB. Distance: ~50 ft in dan  Comments: Pt reporting back pain that is chronic during ambulation.   Stairs/Curb  Stairs?: No     Balance  Posture: Good  Sitting - Static: Good  Sitting - Dynamic: Good  Standing - Static: Good  Standing - Dynamic: Good;-        Plan   Plan  Times per week: 1-2x  Times per day: Daily  Current Treatment Recommendations: Strengthening, ROM, Balance Training, Functional Mobility Training, Transfer Training, Endurance Training, Gait Training, Home Exercise Program, Safety

## 2021-03-04 NOTE — PROGRESS NOTES
Clinical Pharmacy Note    Pharmacy consulted by Dr. Abimbola Cooney to manage TPN    Current TPN rate: 80ml/hr  Goal TPN rate: 80ml/hr    Labs:  General Labs:  BMP:    Lab Results   Component Value Date     03/04/2021    K 3.5 03/04/2021    K 3.1 03/02/2021    CL 99 03/04/2021    CO2 32 03/04/2021    BUN 33 03/04/2021    LABALBU 2.6 03/03/2021    CREATININE 1.1 03/04/2021    CALCIUM 8.6 03/04/2021    GFRAA 59 03/04/2021    LABGLOM 49 03/04/2021    LABGLOM 45 12/06/2018    GLUCOSE 192 03/04/2021     Blood sugars: 190-194    Electrolyte replacement as follows:   Replace potassium with 20 mEq of potassium chloride administered IV over 1 hour    Blood sugar management:  Plan to increase q4 hour Humalog to high dose sliding scale. Plan to continue TPN at goal of 80 ml/hr. Thank you for allowing pharmacy to participate in the care of this patient.     Hemal Lang, PharmD 3/4/2021

## 2021-03-04 NOTE — PROGRESS NOTES
[facility]        Progress Note    Patient Name: Liz Miller  MRN: [id number]  YOB: 1945  Date of Evaluation: 3/4/2021    Chief Complaint: Abdominal pain    Subjective:  No acute events overnight  Pain improving  Nausea improving with NG  Passing flatus but no stool    Vital Signs:  Patient Vitals for the past 24 hrs:   BP Temp Temp src Pulse Resp SpO2 Weight   21 0800 121/70 98.1 °F (36.7 °C) Oral 107 18 94 % --   21 0510 -- -- -- -- -- -- 201 lb 8 oz (91.4 kg)   21 0446 119/77 98.1 °F (36.7 °C) Oral 95 18 94 % --   21 0049 (!) 97/50 98.2 °F (36.8 °C) Oral 113 18 93 % --   21 2014 115/69 98 °F (36.7 °C) Oral 98 18 99 % --   21 1645 117/68 97.9 °F (36.6 °C) Oral 100 18 98 % --   21 1215 122/73 97.5 °F (36.4 °C) Oral 95 18 98 % --      TEMPERATURE HISTORY 24H: Temp (24hrs), Av °F (36.7 °C), Min:97.5 °F (36.4 °C), Max:98.2 °F (36.8 °C)    BLOOD PRESSURE HISTORY: Systolic (31GFH), ZLM:499 , Min:97 , QGD:928    Diastolic (96SNQ), BSV:88, Min:50, Max:89      Intake/Output:  I/O last 3 completed shifts: In: 983.9 [IV Piggyback:331.3]  Out: 1225 [Urine:775; Emesis/NG output:450]  I/O this shift:   In: 0   Out: 300 [Urine:300]  Drain/tube Output:       Physical Exam:  General: awake, alert, oriented to  person, place, time  Cardiovascular:  regular rate and rhythm and no murmur noted  Lungs: clear to auscultation  Abdomen: soft, mild mid abdominal tenderness to deep palpation only, mild distension, improving exam slowly     Labs:  CBC:    Recent Labs     21  0540 21  0534 21  0537   WBC 5.7 5.4 5.5   HGB 12.0 11.3* 11.4*   HCT 37.7 35.2* 35.7*    171 152     BMP:    Recent Labs     21  0540 21  2243 21  0534 21  0537     --  136 137   K 3.1* 3.2* 3.6 3.5   CL 94*  --  97* 99   CO2 33*  --  31 32   BUN 50*  --  42* 33*   CREATININE 1.4*  --  1.4* 1.1   GLUCOSE 121* Narrative:     EXAMINATION:   CT OF THE ABDOMEN AND PELVIS WITHOUT CONTRAST 3/4/2021 2:58 pm     TECHNIQUE:   CT of the abdomen and pelvis was performed without the administration of   intravenous contrast. Multiplanar reformatted images are provided for review. Dose modulation, iterative reconstruction, and/or weight based adjustment of   the mA/kV was utilized to reduce the radiation dose to as low as reasonably   achievable. COMPARISON:   None. HISTORY:   ORDERING SYSTEM PROVIDED HISTORY: pneumatosis   TECHNOLOGIST PROVIDED HISTORY:   Additional Contrast?->Oral   Reason for exam:->pneumatosis   Reason for exam:->water soluble contrast through NG   Reason for Exam: pneumatosis; water soluble contrast through NG   Acuity: Unknown   Type of Exam: Unknown     FINDINGS:   There is overall decrease in pneumoperitoneum and small bowel pneumatosis     There is no free fluid. The solid organs demonstrate no acute abnormality. Impression:     Overall decrease in pneumatosis and pneumoperitoneum. Ct Abdomen Pelvis Wo Contrast Additional Contrast? Radiologist Recommendation    Result Date: 3/2/2021  EXAMINATION: CT OF THE ABDOMEN AND PELVIS WITHOUT CONTRAST 3/2/2021 1:42 am TECHNIQUE: CT of the abdomen and pelvis was performed without the administration of intravenous contrast. Multiplanar reformatted images are provided for review. Dose modulation, iterative reconstruction, and/or weight based adjustment of the mA/kV was utilized to reduce the radiation dose to as low as reasonably achievable.  COMPARISON: 05/13/2020 HISTORY: ORDERING SYSTEM PROVIDED HISTORY: evaluate for pneumoperitoneum, nausea, abdominal distention,  hx pneumatosis, TECHNOLOGIST PROVIDED HISTORY: Reason for exam:->evaluate for pneumoperitoneum, nausea, abdominal distention,  hx pneumatosis, Additional Contrast?->Radiologist Recommendation Reason for Exam: evaluate for pneumoperitoneum, nausea, abdominal distention, hx pneumatosis, Acuity: Acute Type of Exam: Initial Relevant Medical/Surgical History: evaluate for pneumoperitoneum, nausea, abdominal distention,  hx pneumatosis, FINDINGS: Lower Chest: There is bibasilar atelectasis. Organs: The liver, spleen, pancreas, adrenal glands and kidneys are grossly negative given the limitations of the noncontrast technique. GI/Bowel: Small bowel caliber is normal.  Significant diffuse small bowel pneumatosis is again seen. The appendix is normal.  There are diverticula throughout the colon without evidence for diverticulitis. Pelvis: Left adnexal cyst has enlarged and now measures 9.1 cm. The uterus and bladder are grossly negative. Peritoneum/Retroperitoneum: Pneumoperitoneum is identified anteriorly there are multiple thin linear densities throughout the pneumoperitoneum. There is no free fluid or adenopathy. Aortic caliber is normal. Bones/Soft Tissues: No acute findings. 1. Pneumoperitoneum with multiple loculations identified, most likely benign pneumoperitoneum related to rupture of small bowel pneumatosis. 2. Diverticulosis without scan evidence for diverticulitis. 3. Enlarging left ovarian cyst, now measuring 9.1 cm. MRI or surgical evaluation is recommended. Critical results were called by Dr. Radha Garber MD to [facility] on 3/2/2021 at 02:33. Xr Abdomen (kub) (single Ap View)    Result Date: 3/2/2021  EXAMINATION: ONE SUPINE XRAY VIEW(S) OF THE ABDOMEN 3/1/2021 10:13 pm COMPARISON: 03/01/2019 and CT scan from 05/13/2020 HISTORY: ORDERING SYSTEM PROVIDED HISTORY: abdominal pain, nausea, TECHNOLOGIST PROVIDED HISTORY: Reason for exam:->abdominal pain, nausea, Reason for Exam: abdominal pain, nausea Acuity: Unknown Type of Exam: Ongoing Vomiting FINDINGS: There is a large amount of stool throughout the colon. No dilated small bowel is identified. There is a question of gas outlining both walls of bowel in the left mid abdomen.   No acute osseous abnormality is seen.     Questionable Riegler sign which would suggest the presence of pneumoperitoneum. However, the appearance on the current study may simply represent the known pneumatosis this patient has previously been shown to have. CT scan of the abdomen and pelvis is recommended for further evaluation. Large volume of stool throughout the colon. Critical results were called by Dr. Zachary Mckenna MD to [facility] on 3/2/2021 at 00:26. Us Renal Complete    Result Date: 3/1/2021  EXAMINATION: RETROPERITONEAL ULTRASOUND OF THE KIDNEYS AND URINARY BLADDER 3/1/2021 COMPARISON: None HISTORY: ORDERING SYSTEM PROVIDED HISTORY: dr navarro TECHNOLOGIST PROVIDED HISTORY: Reason for exam:->dr order FINDINGS: Kidneys: The right kidney measures 10.0 x 6.6 x 5.4 cm in length and the left kidney measures 9.9 x 5.4 x 4.5 cm in length. Kidneys demonstrate normal cortical echogenicity. No evidence of hydronephrosis or intrarenal stones. Bladder: Bladder is incompletely distended and otherwise unremarkable in appearance. Unremarkable ultrasound of the kidneys and urinary bladder. Xr Chest Portable    Result Date: 3/2/2021  EXAMINATION: ONE XRAY VIEW OF THE CHEST 3/2/2021 11:38 am COMPARISON: None. HISTORY: ORDERING SYSTEM PROVIDED HISTORY: confirm placement of PICC TECHNOLOGIST PROVIDED HISTORY: Reason for exam:->confirm placement of PICC Reason for Exam: confirm placement of PICC Acuity: Acute Type of Exam: Initial FINDINGS: The lungs are clear. The mediastinum and cardiac silhouette are unremarkable. There is a right-sided PICC line in place, the tip projects over the cavoatrial junction. No acute abnormality.      Xr Abdomen For Ng/og/ne Tube Placement    Result Date: 3/2/2021  EXAMINATION: ONE SUPINE XRAY VIEW(S) OF THE ABDOMEN 3/2/2021 1:49 pm COMPARISON: March 1, 2021 HISTORY: ORDERING SYSTEM PROVIDED HISTORY: Confirm tip placement of NG tube TECHNOLOGIST PROVIDED HISTORY: Reason for exam:->Confirm tip placement of NG tube Portable? ->Yes Reason for Exam: NG tube placement Acuity: Unknown Type of Exam: Unknown FINDINGS: Median sternotomy. Left atrial Appendage clip is noted. Patient has had a valvuloplasty. The cardiac silhouette is mildly enlarged. There is a small left pleural effusion and left base opacity. Right PICC catheter tip overlies the right atrium. Enteric catheter tip overlies the body of the stomach. Side hole is distal to the GE junction. Bowel gas pattern is unremarkable. Enteric catheter tip overlies the body of the stomach. Side hole is distal to the GE junction.        Scheduled Meds:   insulin lispro  0-18 Units Subcutaneous Q4H    [START ON 3/5/2021] insulin glargine  28 Units Subcutaneous QAM    furosemide  20 mg Intravenous Daily    digoxin  125 mcg Intravenous Daily    enoxaparin  1 mg/kg Subcutaneous BID    sodium chloride flush  10 mL Intravenous 2 times per day    piperacillin-tazobactam  3,375 mg Intravenous Q8H    sodium chloride flush  10 mL Intravenous 2 times per day    pantoprazole  40 mg Intravenous Daily    [START ON 3/9/2021] fat emulsion  250 mL Intravenous Once per day on Mon Thu    exenatide  10 mcg Subcutaneous BID WC    sodium chloride flush  10 mL Intravenous 2 times per day    gabapentin  300 mg Oral Nightly    montelukast  10 mg Oral Nightly     Continuous Infusions:   PN-Adult Premix 5/20 - Standard Electrolytes - Central Line 80 mL/hr at 03/03/21 1830    dextrose 100 mL/hr (03/02/21 1519)     PRN Meds:.bisacodyl, phenol, sodium chloride flush, sodium chloride flush, potassium chloride, morphine, promethazine, sodium chloride flush, promethazine **OR** ondansetron, polyethylene glycol, acetaminophen **OR** acetaminophen, glucose, dextrose, glucagon (rDNA), dextrose, albuterol sulfate HFA, oxyCODONE      Assessment:  Ms. Sharda Heredia is a 77 y.o. female who presents with   Small bowel pneumatosis with loculated pneumoperitoneum  CHF  CAD  Atrial fibrillation  History of DVT/PE  Medical coagulopathy, on coumadin     Plan:  1. Although has pneumatosis of small bowel with loculated pneumoperitoneum, vital signs are stable, lactic acid normal, without peritoneal signs or toxicity. Does not need emergent/urgent surgical exploration unless condition deteriorates. Repeat imaging with CT today shows improvement of both pneumatosis and pneumoperitoneum. Anticipate repeat imaging in several days prior to restarting diet  2. Bowel rest, NG decompression, continue TPN  3. IVF, monitor and replace electrolytes as needed  4. Pain medication and antiemetics as needed with caution as may mask worsening exam  5. Antibiotics  6. Hold coumadin, may use heparin gtt or lovenox from surgical standpoint if anticoagulation needed  7. Defer management of remainder of medical comorbidities to primary and consulting teams    Tushar Leon MD, FACS  3/4/2021  11:26 AM

## 2021-03-04 NOTE — PLAN OF CARE
Problem: OXYGENATION/RESPIRATORY FUNCTION  Goal: Patient will achieve/maintain normal respiratory rate/effort  Description: Respiratory rate and effort will be within normal limits for the patient  Outcome: Ongoing     Problem: HEMODYNAMIC STATUS  Goal: Patient has stable vital signs and fluid balance  Outcome: Ongoing     Problem: FLUID AND ELECTROLYTE IMBALANCE  Goal: Fluid and electrolyte balance are achieved/maintained  Outcome: Ongoing     Problem: ACTIVITY INTOLERANCE/IMPAIRED MOBILITY  Goal: Mobility/activity is maintained at optimum level for patient  Outcome: Ongoing     Problem: Pain:  Goal: Pain level will decrease  Description: Pain level will decrease  Outcome: Ongoing  Goal: Control of acute pain  Description: Control of acute pain  Outcome: Ongoing     Problem: Nausea/Vomiting:  Goal: Absence of nausea/vomiting  Description: Absence of nausea/vomiting  Outcome: Ongoing     Problem: Nutrition  Goal: Optimal nutrition therapy  Outcome: Ongoing

## 2021-03-04 NOTE — PROGRESS NOTES
Q4H    digoxin  125 mcg Intravenous Daily    enoxaparin  1 mg/kg Subcutaneous BID    sodium chloride flush  10 mL Intravenous 2 times per day    piperacillin-tazobactam  3,375 mg Intravenous Q8H    sodium chloride flush  10 mL Intravenous 2 times per day    pantoprazole  40 mg Intravenous Daily    insulin glargine  25 Units Subcutaneous QAM    [START ON 3/9/2021] fat emulsion  250 mL Intravenous Once per day on Mon Thu    exenatide  10 mcg Subcutaneous BID WC    sodium chloride flush  10 mL Intravenous 2 times per day    gabapentin  300 mg Oral Nightly    montelukast  10 mg Oral Nightly     Continuous Infusions:   PN-Adult Premix 5/20 - Standard Electrolytes - Central Line 80 mL/hr at 03/03/21 1830    dextrose 100 mL/hr (03/02/21 1519)     PRN Meds:.bisacodyl, phenol, sodium chloride flush, sodium chloride flush, potassium chloride, morphine, promethazine, sodium chloride flush, promethazine **OR** ondansetron, polyethylene glycol, acetaminophen **OR** acetaminophen, glucose, dextrose, glucagon (rDNA), dextrose, albuterol sulfate HFA, oxyCODONE  Continuous Infusions:   PN-Adult Premix 5/20 - Standard Electrolytes - Central Line 80 mL/hr at 03/03/21 1830    dextrose 100 mL/hr (03/02/21 1519)       Intake/Output Summary (Last 24 hours) at 3/4/2021 0858  Last data filed at 3/4/2021 0510  Gross per 24 hour   Intake 983.92 ml   Output 1225 ml   Net -241.08 ml       Lab Data:  CBC:   Lab Results   Component Value Date    WBC 5.5 03/04/2021    HGB 11.4 03/04/2021     03/04/2021     BMP:  Lab Results   Component Value Date     03/04/2021    K 3.5 03/04/2021    K 3.1 03/02/2021    CL 99 03/04/2021    CO2 32 03/04/2021    BUN 33 03/04/2021    CREATININE 1.1 03/04/2021    GLUCOSE 192 03/04/2021     INR:   Lab Results   Component Value Date    INR 1.92 03/04/2021    INR 1.95 03/03/2021    INR 1.81 03/02/2021        CARDIAC LABS  ENZYMES:No results for input(s): CKMB, CKMBINDEX, TROPONINI in the last 72 hours. Invalid input(s): CKTOTAL;3  FASTING LIPID PANEL:  Lab Results   Component Value Date    HDL 40 12/23/2020    LDLDIRECT 127 08/21/2018    LDLCALC 97 12/23/2020    TRIG 94 03/03/2021    TSH 2.01 10/21/2020     LIVER PROFILE:  Lab Results   Component Value Date    AST 18 03/03/2021    AST 18 03/02/2021    ALT 14 03/03/2021    ALT 15 03/02/2021     BNP:   Lab Results   Component Value Date    PROBNP 4,734 03/02/2021    PROBNP 5,497 02/26/2021    PROBNP 4,321 12/15/2020     Iron Studies:    Lab Results   Component Value Date    FERRITIN 799.9 10/05/2018    FERRITIN 416.5 08/30/2018     Lab Results   Component Value Date    IRON 49 11/30/2020    TIBC 310 11/30/2020    FERRITIN 799.9 (H) 10/05/2018      Iron Deficiency Anemia:  No    IV Iron Therapy:  No  2017 ACC/AHA HF Guidelines:   intravenous iron replacement in patients with New York Heart Association (NYHA) class II and III HF and iron deficiency(ferritin <100 ng/ml or 100-300 ng/ml if transferrin saturation <20%), to improve functional status and QoL. 1. WEIGHT: Admit Weight: 211 lb (95.7 kg)      Today  Weight: 201 lb 8 oz (91.4 kg)   2.  I/O     Intake/Output Summary (Last 24 hours) at 3/4/2021 0858  Last data filed at 3/4/2021 0510  Gross per 24 hour   Intake 983.92 ml   Output 1225 ml   Net -241.08 ml       Cardiac Testing:   Dobut ECHO:  11/30/2020   Summary   Dobutamine echocardiogram for aortic stenosis.   Very technically limited exam due to atrial fibrillation.   Baseline echocardiogram shows severe left ventricular dysfunction with   anterior, lateral and apical hypokinesis with an ejection fraction of 20-25   %.   There is no improvement in wall motion with low or high dose dobutamine suggestive of nonviable myocardium.   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 After dobutamine stress the mean AV gradient rises to 20mmHg with 20mcg of dobutamine consistent with mild to moderate aortic stenosis.   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 AHF- volume increasing, agree with lasix restart, check BNP tomorrow   2. ICM- off richard and coreg due to CKD and hypotension, Dig started  3. AF- rate improved, continue Dig, restart coreg at 6.25 when taking po  4.  Hypotension - stable        I appreciate the opportunity of cooperating in the care of this individual.    Billie Duran, ACNP, AGPCNP 3/4/2021, 8:58 AM  Heart Failure  The [address], [address]  Ph: [phone]      Core Measures:   · Discharge instructions:   · LVEF documented:   · ACEI for LV dysfunction:   · Smoking Cessation:

## 2021-03-04 NOTE — PLAN OF CARE
Problem: HEMODYNAMIC STATUS  Goal: Patient has stable vital signs and fluid balance  Outcome: Ongoing   Pt on Iv lasix daily. Strict I&O being done, daily weight being done. Pt on RA, no c/o sob. Problem: Pain:  Goal: Pain level will decrease  Description: Pain level will decrease  Outcome: Ongoing   Pt A&O, aware of needs. Pt on IV pain medication. Will monitor.

## 2021-03-04 NOTE — DISCHARGE INSTR - COC
Continuity of Care Form    Patient Name: Shey Barney   :  1945  MRN:  [id number]    Admit date:  2021  Discharge date:  ***    Code Status Order: Full Code   Advance Directives:   Advance Care Flowsheet Documentation     Date/Time Healthcare Directive Type of Healthcare Directive Copy in [facility] [address] Agent's Name Healthcare Agent's Phone Number    21 1609  No, patient does not have an advance directive for healthcare treatment -- -- -- -- --          Admitting Physician:  Jorge Castro MD  PCP: Braulio Kennedy MD    Discharging Nurse: [facility] Unit/Room#: [id number]  Discharging Unit Phone Number: ***    Emergency Contact:   Extended Emergency Contact Information  Primary Emergency Contact: Murphy August  Address: [address] [place] [address] Phone: [phone]  Mobile Phone: [phone]  Relation: Spouse  Secondary Emergency Contact: [address] Phone: [phone]  Mobile Phone: [phone]  Relation: Child    Past Surgical History:  Past Surgical History:   Procedure Laterality Date    BRONCHOSCOPY  2016    Dr. Angy Soares - brushings from [address] [place] [address]  2018    Dr. Leticia Rivera  2017    Dr. Newton Berumen - sigmoid diverticulosis, polypectomies x3    COLONOSCOPY  2014    Dr. Newton Berumen - sigmoid diverticulosis, polypectomies x3, internal hemorrhoids    COLONOSCOPY  10/10/2018    w/biopsy performed by Diandra Nicole MD at [address] [place] [address] 2020    COLONOSCOPY DIAGNOSTIC performed by Angy Soares MD at [address]  2018    Dr. Amilcar Zhou - x3 (LIMA-LAD, L SV-D1-PLV) modified BL MAZE procedure w/obliteration of WAYNE using 45mm AtriClip    HYSTERECTOMY      INSERTABLE CARDIAC MONITOR Left 2018     Nkechi - Reveal [facility]# [id number] Medtronic    MITRAL VALVE REPLACEMENT  08/21/2018    Dr. Blanca Ibarra - 27mm Medtronic Cinch tissue valve    PAIN MANAGEMENT PROCEDURE N/A 12/18/2020    C6-C7 MIDLINE  EPIDURAL STEROID INJECTION WITH FLUOROSCOPY performed by Ata Mars MD at [address] Bilateral 1/18/2021    BILATERAL T11 TRANSFORAMINAL EPIDURAL STEROID INJECTION WITH FLUOROSCOPY performed by Ata Mars MD at [address] TRANSESOPHAGEAL ECHOCARDIOGRAM  08/21/2018    during CABG/MVR    TUNNELED VENOUS CATHETER PLACEMENT Left 08/23/2018    Dr. Rommel White - IJ for HD---since removed    UPPER GASTROINTESTINAL ENDOSCOPY N/A 10/10/2018    w/biopsy performed by Lor Worrell MD at [address] ENDOSCOPY       Immunization History:   Immunization History   Administered Date(s) Administered    Influenza Virus Vaccine 09/26/2012, 12/15/2014, 12/16/2015, 12/27/2016    Influenza Whole 09/26/2012    Influenza, Quadv, 6-35 months, IM, PF (Fluzone, Afluria) 12/18/2018, 09/23/2020    Influenza, Lillie Denny, IM, (6 mo and older Fluzone, Flulaval, Fluarix and 3 yrs and older Afluria) 12/27/2016    Pneumococcal Conjugate 13-valent ([id number]) 04/15/2015    Pneumococcal Polysaccharide ([id number]) 04/28/2016       Active Problems:  Patient Active Problem List   Diagnosis Code    Long term current use of anticoagulant Z79.01    Hypercholesteremia E78.00    Generalized osteoarthrosis, involving multiple sites K12.3    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    Paroxysmal SVT (supraventricular tachycardia) ([facility]) I47.1    B12 deficiency E53.8    History of pulmonary embolism Z86.711    Multiple pulmonary nodules R91.8    Type 2 diabetes mellitus with hyperglycemia, with long-term current use of insulin ([facility]) E11.65, Z79.4    Asthma J45.909    Pneumoperitoneum K66.8    PAF (paroxysmal atrial fibrillation) ([facility]) I48.0    Hypertension I10    Cardiac arrhythmia I49.9    Encounter for loop recorder check Z45.09    Coronary artery disease due to lipid rich plaque I25.10, I25.83    Nonrheumatic mitral valve regurgitation I34.0    S/P CABG x 3 Z95.1    Goiter E04.9    Primary osteoarthritis of both knees M17.0    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    Obesity, Class I, BMI 30-34.9 E66.9    Chronic combined systolic (congestive) and diastolic (congestive) heart failure (HCC) I50.42    Thoracic degenerative disc disease M51.34    Persistent atrial fibrillation (HCC) I48.19    S/P MVR (mitral valve repair) Z98.890    Ischemic cardiomyopathy I25.5    Occlusion and stenosis of bilateral carotid arteries I65.23    Pneumatosis intestinalis of small intestine K63.89    Nonrheumatic aortic valve stenosis I35.0    DVT (deep vein thrombosis) in pregnancy O22.30    CHF (congestive heart failure), NYHA class I, acute on chronic, combined (HCC) I50.43    Stage 3 chronic kidney disease N18.30    Coronary artery disease involving native heart without angina pectoris I25.10       Isolation/Infection:   Isolation          No Isolation        Patient Infection Status     Infection Onset Added Last Indicated Last Indicated By Review Planned Expiration Resolved Resolved By    None active    Resolved    C-diff Rule Out 05/17/20 05/17/20 05/17/20 Clostridium difficile toxin/antigen (Ordered)   08/07/20 Christal Aschoff, RN          Nurse Assessment:  Last Vital Signs: BP (!) 98/58   Pulse 107   Temp 98 °F (36.7 °C) (Oral)   Resp 16   Ht 5' 8\" (1.727 m)   Wt 201 lb 8 oz (91.4 kg)   SpO2 96%   BMI 30.64 kg/m²     Last documented pain score (0-10 scale): Pain Level: 8  Last Weight:   Wt Readings from Last 1 Encounters:   03/04/21 201 lb 8 oz (91.4 kg)     Mental Status:  {IP PT MENTAL STATUS:20030}    IV Access:  {[facility] IV ACCESS:429360476}    Nursing Mobility/ADLs:  Walking   {[facility] DME ULWD:633743751}  Transfer  {[facility] CMBB:918020328}  Bathing  {[facility] SOSJ:230799256}  Dressing  {[facility] ULAZ:013221225}  Toileting · Address:  · Phone:  · Fax:    Dialysis Facility (if applicable)   · Name:  · Address:  · Dialysis Schedule:  · Phone:  · Fax:    / signature: {Esignature:764889691}    PHYSICIAN SECTION    Prognosis: {Prognosis:5236644656}    Condition at Discharge: Anika Marin Patient Condition:558131756}    Rehab Potential (if transferring to Rehab): {Prognosis:2061358786}    Recommended Labs or Other Treatments After Discharge: ***    Physician Certification: I certify the above information and transfer of Katie Quiles  is necessary for the continuing treatment of the diagnosis listed and that she requires {Admit to Appropriate Level of Care:19006} for {GREATER/LESS:512936036} 30 days.      Update Admission H&P: [facility]618167248}    PHYSICIAN SIGNATURE:  {Esignature:425941724}

## 2021-03-04 NOTE — PROGRESS NOTES
Nephrology Progress Note  [phone]   [email or web address]    Patient:  Bobby Romero   : 1945    Brief HPI    78 yo woman with h/o Hypertension, DM II, CAD/CABG, s/p MVR prosthetic,  HPL, Afib on warfarin, h/o DVT/PE on warfarin, GERD presented from cardiology office with sob and weight gain.    A Dobutamine echo in 2020 with LVEF of 20-25%  Admitted with decompensated CHF      Subjective/Interval history    Pt seen and examined  Creatinine continues to improve  BPs low normal  On room air      Review of Systems   No abdominal pain or nausea/emesis  No fevers/chills    SHx:  No visitors at the bed-side    Meds:  Scheduled Meds:   insulin lispro  0-18 Units Subcutaneous Q4H    [START ON 3/5/2021] insulin glargine  28 Units Subcutaneous QAM    furosemide  20 mg Intravenous Daily    digoxin  125 mcg Intravenous Daily    enoxaparin  1 mg/kg Subcutaneous BID    sodium chloride flush  10 mL Intravenous 2 times per day    piperacillin-tazobactam  3,375 mg Intravenous Q8H    sodium chloride flush  10 mL Intravenous 2 times per day    pantoprazole  40 mg Intravenous Daily    [START ON 3/9/2021] fat emulsion  250 mL Intravenous Once per day on Mon Thu    exenatide  10 mcg Subcutaneous BID WC    sodium chloride flush  10 mL Intravenous 2 times per day    gabapentin  300 mg Oral Nightly    montelukast  10 mg Oral Nightly     Continuous Infusions:   PN-Adult Premix  - Standard Electrolytes - Central Line 80 mL/hr at 21 1830    dextrose 100 mL/hr (21 1519)     PRN Meds:.bisacodyl, phenol, sodium chloride flush, sodium chloride flush, potassium chloride, morphine, promethazine, sodium chloride flush, promethazine **OR** ondansetron, polyethylene glycol, acetaminophen **OR** acetaminophen, glucose, dextrose, glucagon (rDNA), dextrose, albuterol sulfate HFA, oxyCODONE      Vitals:  /70   Pulse 107   Temp 98.1 °F (36.7 °C) (Oral)   Resp 18   Ht 5' 8\" (1.727 m)   Wt 201 lb 8 oz (91.4 kg)   SpO2 94%   BMI 30.64 kg/m²       Physical Exam  General : AAOx3, not in pain or respiratory distress, sitting in the chair  HEENT : mucosa moist. NGT+  CVS: S1 S2 normal, regular rhythm, no murmurs or rubs. Lungs: Clear, no wheezing or crackles. Abd: Soft, bowel sounds normal, non-tender. Ext: No edema, no cyanosis  Skin: Warm. No rashes appreciated.   : bladder non-distended, no tenderness over the bladder      Labs:  CBC with Differential:    Lab Results   Component Value Date    WBC 5.8 03/04/2021    RBC 4.05 03/04/2021    HGB 11.2 03/04/2021    HCT 35.0 03/04/2021     03/04/2021    MCV 86.5 03/04/2021    MCH 27.7 03/04/2021    MCHC 32.0 03/04/2021    RDW 16.8 03/04/2021    SEGSPCT 64.1 09/02/2020    BANDSPCT 1 01/14/2019    LYMPHOPCT 11.3 03/04/2021    MONOPCT 8.9 03/04/2021    BASOPCT 0.4 03/04/2021    MONOSABS 0.5 03/04/2021    LYMPHSABS 0.7 03/04/2021    EOSABS 0.3 03/04/2021    BASOSABS 0.0 03/04/2021     BMP:    Lab Results   Component Value Date     03/04/2021    K 3.5 03/04/2021    K 3.1 03/02/2021    CL 99 03/04/2021    CO2 32 03/04/2021    BUN 33 03/04/2021    LABALBU 2.6 03/03/2021    CREATININE 1.1 03/04/2021    CALCIUM 8.6 03/04/2021    GFRAA 59 03/04/2021    LABGLOM 49 03/04/2021    LABGLOM 45 12/06/2018    GLUCOSE 192 03/04/2021     Ionized Calcium:  No results found for: IONCA  Magnesium:    Lab Results   Component Value Date    MG 2.40 03/04/2021     Phosphorus:    Lab Results   Component Value Date    PHOS 3.4 03/04/2021     Last 3 Troponin:    Lab Results   Component Value Date    TROPONINI <0.01 05/10/2020    TROPONINI <0.01 05/09/2020    TROPONINI <0.01 08/29/2019     U/A:    Lab Results   Component Value Date    COLORU YELLOW 02/28/2021    PROTEINU Negative 02/28/2021    PHUR 7.0 02/28/2021    WBCUA 31 05/11/2020    RBCUA neg 02/12/2021    RBCUA 1 05/11/2020    YEAST neg 02/12/2021    BACTERIA trace 02/12/2021    BACTERIA 1+ 05/11/2020    CLARITYU Clear 02/28/2021    SPECGRAV 1.010 02/28/2021    LEUKOCYTESUR Negative 02/28/2021    UROBILINOGEN 1.0 02/28/2021    BILIRUBINUR Negative 02/28/2021    BLOODU Negative 02/28/2021    GLUCOSEU Negative 02/28/2021       Assessment/Plan:    1. Acute Kidney injury  Possibly pre-renal/diuretic use in the setting of severe LV dysfunction  Base-line creatinine low 1s  Creatinine on admission was 1.5, peaked at 1.9 and is improving  Diuretics resumed  Monitor in the setting of diuresis. 2. CHF   Continue lasix IV  3. Atrial fibrillation rate controlled  4. Small bowel pneumatosis with loculated pneumoperitoneum  On TPN  S/p NGT for bowel decompression        Stoney Pascal MD.  3/4/2021  Office Phone : [phone]    Thank you for allowing us to participate in the care of this pt. I willcontinue to follow along. Please call with questions or concerns.

## 2021-03-04 NOTE — PROGRESS NOTES
[facility]    3/4/2021 9:03 AM        Name: Corin Bell . Admitted: 2/26/2021  Primary Care Provider: Blanca Corado MD (Tel: [phone])                        Subjective:  .     Sitting in bed abdomen less distended today denies any shortness of breath no chest pain but increased edema in the lower extremities  Reviewed interval ancillary notes    Current Medications      insulin lispro (1 Unit Dial) 0-18 Units, Q4H      [START ON 3/5/2021] insulin glargine (LANTUS;BASAGLAR) injection pen 28 Units, QAM      digoxin (LANOXIN) injection 125 mcg, Daily      PN-Adult Premix 5/20 - Standard Electrolytes - Central Line, Continuous TPN      enoxaparin (LOVENOX) injection 90 mg, BID      bisacodyl (DULCOLAX) suppository 10 mg, Daily PRN      phenol 1.4 % mouth spray 1 spray, Q2H PRN      sodium chloride flush 0.9 % injection 10 mL, 2 times per day      sodium chloride flush 0.9 % injection 10 mL, PRN      piperacillin-tazobactam (ZOSYN) 3,375 mg in dextrose 5 % 50 mL IVPB extended infusion (mini-bag), Q8H      sodium chloride flush 0.9 % injection 10 mL, 2 times per day      sodium chloride flush 0.9 % injection 10 mL, PRN      pantoprazole (PROTONIX) injection 40 mg, Daily      potassium chloride 20 mEq/50 mL IVPB (Central Line), PRN      [START ON 3/9/2021] fat emulsion 20 % infusion 250 mL, Once per day on Mon Thu      morphine (PF) injection 2 mg, Q4H PRN      promethazine (PHENERGAN) injection 12.5 mg, Q6H PRN      exenatide (BYETTA) injection 10 mcg -- PATIENT SUPPLIED, BID WC      sodium chloride flush 0.9 % injection 10 mL, 2 times per day      sodium chloride flush 0.9 % injection 10 mL, PRN      promethazine (PHENERGAN) tablet 12.5 mg, Q6H PRN    Or      ondansetron (ZOFRAN) injection 4 mg, Q6H PRN      polyethylene glycol (GLYCOLAX) packet 17 g, Daily PRN      acetaminophen (TYLENOL) tablet 650 mg, Q6H PRN    Or      acetaminophen (TYLENOL) suppository 650 mg, Q6H PRN      glucose (GLUTOSE) 40 % oral gel 15 g, PRN      dextrose 50 % IV solution, PRN      glucagon (rDNA) injection 1 mg, PRN      dextrose 5 % solution, PRN      albuterol sulfate  (90 Base) MCG/ACT inhaler 2 puff, Q6H PRN      gabapentin (NEURONTIN) capsule 300 mg, Nightly      montelukast (SINGULAIR) tablet 10 mg, Nightly      oxyCODONE (ROXICODONE) immediate release tablet 10 mg, Q3H PRN        Objective:  /70   Pulse 107   Temp 98.1 °F (36.7 °C) (Oral)   Resp 18   Ht 5' 8\" (1.727 m)   Wt 201 lb 8 oz (91.4 kg)   SpO2 94%   BMI 30.64 kg/m²     Intake/Output Summary (Last 24 hours) at 3/4/2021 0903  Last data filed at 3/4/2021 0510  Gross per 24 hour   Intake 983.92 ml   Output 1225 ml   Net -241.08 ml      Wt Readings from Last 3 Encounters:   03/04/21 201 lb 8 oz (91.4 kg)   02/26/21 213 lb (96.6 kg)   01/18/21 200 lb (90.7 kg)       General appearance:  Appears comfortable  Eyes: Sclera clear. Pupils equal.  ENT: Moist oral mucosa. Trachea midline  Cardiovascular: Irregularly irregular no murmurs appreciated  Respiratory: mild bibasilar crackles no wheezing  GI: Soft slight diffuse tenderness to palpation  no guarding no rigidity bowel sounds positive    Neurology: no gross focal deficits  Ext: 1+ edema  Psych: Normal affect.  Alert and oriented in time, place and person  Skin: Warm, dry, normal turgor    Labs and Tests:  CBC:   Recent Labs     03/02/21  0540 03/03/21  0534 03/04/21  0537   WBC 5.7 5.4 5.5   HGB 12.0 11.3* 11.4*    171 152     BMP:    Recent Labs     03/02/21  0540 03/02/21  2243 03/03/21  0534 03/04/21  0537     --  136 137   K 3.1* 3.2* 3.6 3.5   CL 94*  --  97* 99   CO2 33*  --  31 32   BUN 50*  --  42* 33*   CREATININE 1.4*  --  1.4* 1.1   GLUCOSE 121*  --  154* 192*     Hepatic:   Recent Labs 03/02/21  0540 03/03/21  0534   AST 18 18   ALT 15 14   BILITOT 0.9 1.1*   ALKPHOS 155* 153*       Discussed care with family and patient             Spent 30  minutes with patient and family at bedside and on unit reviewing medical records and labs, spent greater than 50% time counseling patient and family on diagnosis and plan   Problem List  Active Problems:    Pneumoperitoneum    Pneumatosis intestinalis of small intestine    CHF (congestive heart failure), NYHA class I, acute on chronic, combined ([facility] [address]    Stage 3 chronic kidney disease    Coronary artery disease involving native heart without angina pectoris  Resolved Problems:    * No resolved hospital problems. *       Assessment & Plan:   76-year-old female who was admitted for weight gain of 13 pounds found to have acute on chronic CHF exacerbation. Patient has been diuresing with IV Lasix here. Found to have bump in creatinine with renal failure with creatinine of 1.5 cardiology and nephrology has been following.       Small bowel pneumatosis with loculated pneumoperitoneum  - npo  - iv atbx  -tpn  - ng tube for decompression  - surgery on board    Acute on chronic systolic CHF  - start on lasix 20mg IV      Hypokalemia  - improved    TORIBIO on CKD3:   - stable    Chronic A. Fib  - continue meds, cards on board  - coumadin on hold, lovenox         Diet: Diet NPO Effective Now  PN-Adult Premix 5/20 - Standard Electrolytes - Central Line  Code:Full Code    Maurice Schaefer MD   3/4/2021 9:03 AM

## 2021-03-04 NOTE — CARE COORDINATION
Chart reviewed for discharge planning. Barrier(s) to discharge-;  New diagnosis -Small bowel pneumatosis with loculated pneumoperitoneum  - npo  - iv atbx  -tpn  - ng tube for decompression    Tentative discharge plan-initally was to home, but will need to be revaluated when medically stable from the new above diagnosis    Tentative discharge date-  TBD    Case management will continue to follow progress and update discharge plan as needed.

## 2021-03-05 LAB
ANION GAP SERPL CALCULATED.3IONS-SCNC: 4 MMOL/L (ref 3–16)
APTT: 33.7 SEC (ref 24.2–36.2)
BASOPHILS ABSOLUTE: 0 K/UL (ref 0–0.2)
BASOPHILS RELATIVE PERCENT: 0.3 %
BUN BLDV-MCNC: 33 MG/DL (ref 7–20)
CALCIUM SERPL-MCNC: 8.7 MG/DL (ref 8.3–10.6)
CHLORIDE BLD-SCNC: 101 MMOL/L (ref 99–110)
CO2: 32 MMOL/L (ref 21–32)
CREAT SERPL-MCNC: 1 MG/DL (ref 0.6–1.2)
EOSINOPHILS ABSOLUTE: 0.4 K/UL (ref 0–0.6)
EOSINOPHILS RELATIVE PERCENT: 8.7 %
GFR AFRICAN AMERICAN: >60
GFR NON-AFRICAN AMERICAN: 54
GLUCOSE BLD-MCNC: 151 MG/DL (ref 70–99)
GLUCOSE BLD-MCNC: 159 MG/DL (ref 70–99)
GLUCOSE BLD-MCNC: 160 MG/DL (ref 70–99)
GLUCOSE BLD-MCNC: 183 MG/DL (ref 70–99)
GLUCOSE BLD-MCNC: 187 MG/DL (ref 70–99)
GLUCOSE BLD-MCNC: 233 MG/DL (ref 70–99)
GLUCOSE BLD-MCNC: 235 MG/DL (ref 70–99)
HCT VFR BLD CALC: 32.6 % (ref 36–48)
HCT VFR BLD CALC: 34 % (ref 36–48)
HCT VFR BLD CALC: 35.4 % (ref 36–48)
HEMOGLOBIN: 10.5 G/DL (ref 12–16)
HEMOGLOBIN: 10.8 G/DL (ref 12–16)
HEMOGLOBIN: 11.1 G/DL (ref 12–16)
INR BLD: 1.29 (ref 0.86–1.14)
LACTIC ACID: 0.9 MMOL/L (ref 0.4–2)
LYMPHOCYTES ABSOLUTE: 0.6 K/UL (ref 1–5.1)
LYMPHOCYTES RELATIVE PERCENT: 12.1 %
MAGNESIUM: 2.3 MG/DL (ref 1.8–2.4)
MCH RBC QN AUTO: 27.6 PG (ref 26–34)
MCHC RBC AUTO-ENTMCNC: 32.2 G/DL (ref 31–36)
MCV RBC AUTO: 85.9 FL (ref 80–100)
MONOCYTES ABSOLUTE: 0.6 K/UL (ref 0–1.3)
MONOCYTES RELATIVE PERCENT: 12 %
NEUTROPHILS ABSOLUTE: 3.5 K/UL (ref 1.7–7.7)
NEUTROPHILS RELATIVE PERCENT: 66.9 %
PDW BLD-RTO: 17 % (ref 12.4–15.4)
PERFORMED ON: ABNORMAL
PHOSPHORUS: 2.9 MG/DL (ref 2.5–4.9)
PLATELET # BLD: 141 K/UL (ref 135–450)
PMV BLD AUTO: 8 FL (ref 5–10.5)
POTASSIUM SERPL-SCNC: 3.1 MMOL/L (ref 3.5–5.1)
PRO-BNP: 2059 PG/ML (ref 0–449)
PROTHROMBIN TIME: 15 SEC (ref 10–13.2)
RBC # BLD: 3.8 M/UL (ref 4–5.2)
SODIUM BLD-SCNC: 137 MMOL/L (ref 136–145)
WBC # BLD: 5.2 K/UL (ref 4–11)

## 2021-03-05 PROCEDURE — 83605 ASSAY OF LACTIC ACID: CPT

## 2021-03-05 PROCEDURE — 6360000002 HC RX W HCPCS: Performed by: INTERNAL MEDICINE

## 2021-03-05 PROCEDURE — 80048 BASIC METABOLIC PNL TOTAL CA: CPT

## 2021-03-05 PROCEDURE — 2580000003 HC RX 258: Performed by: NURSE PRACTITIONER

## 2021-03-05 PROCEDURE — 85025 COMPLETE CBC W/AUTO DIFF WBC: CPT

## 2021-03-05 PROCEDURE — 6360000002 HC RX W HCPCS: Performed by: NURSE PRACTITIONER

## 2021-03-05 PROCEDURE — 97535 SELF CARE MNGMENT TRAINING: CPT

## 2021-03-05 PROCEDURE — 97165 OT EVAL LOW COMPLEX 30 MIN: CPT

## 2021-03-05 PROCEDURE — 97530 THERAPEUTIC ACTIVITIES: CPT

## 2021-03-05 PROCEDURE — APPNB30 APP NON BILLABLE TIME 0-30 MINS: Performed by: NURSE PRACTITIONER

## 2021-03-05 PROCEDURE — 85730 THROMBOPLASTIN TIME PARTIAL: CPT

## 2021-03-05 PROCEDURE — 83880 ASSAY OF NATRIURETIC PEPTIDE: CPT

## 2021-03-05 PROCEDURE — 85014 HEMATOCRIT: CPT

## 2021-03-05 PROCEDURE — 99232 SBSQ HOSP IP/OBS MODERATE 35: CPT | Performed by: SURGERY

## 2021-03-05 PROCEDURE — 1200000000 HC SEMI PRIVATE

## 2021-03-05 PROCEDURE — C9113 INJ PANTOPRAZOLE SODIUM, VIA: HCPCS | Performed by: NURSE PRACTITIONER

## 2021-03-05 PROCEDURE — 85610 PROTHROMBIN TIME: CPT

## 2021-03-05 PROCEDURE — 6370000000 HC RX 637 (ALT 250 FOR IP): Performed by: INTERNAL MEDICINE

## 2021-03-05 PROCEDURE — APPSS15 APP SPLIT SHARED TIME 0-15 MINUTES: Performed by: NURSE PRACTITIONER

## 2021-03-05 PROCEDURE — 2500000003 HC RX 250 WO HCPCS: Performed by: SURGERY

## 2021-03-05 PROCEDURE — 36415 COLL VENOUS BLD VENIPUNCTURE: CPT

## 2021-03-05 PROCEDURE — 2580000003 HC RX 258: Performed by: INTERNAL MEDICINE

## 2021-03-05 PROCEDURE — 83735 ASSAY OF MAGNESIUM: CPT

## 2021-03-05 PROCEDURE — 84100 ASSAY OF PHOSPHORUS: CPT

## 2021-03-05 PROCEDURE — 99232 SBSQ HOSP IP/OBS MODERATE 35: CPT | Performed by: NURSE PRACTITIONER

## 2021-03-05 PROCEDURE — 6360000002 HC RX W HCPCS: Performed by: SURGERY

## 2021-03-05 PROCEDURE — 85018 HEMOGLOBIN: CPT

## 2021-03-05 RX ORDER — FUROSEMIDE 10 MG/ML
40 INJECTION INTRAMUSCULAR; INTRAVENOUS DAILY
Status: DISCONTINUED | OUTPATIENT
Start: 2021-03-06 | End: 2021-03-10

## 2021-03-05 RX ORDER — DIPHENHYDRAMINE HYDROCHLORIDE 50 MG/ML
12.5 INJECTION INTRAMUSCULAR; INTRAVENOUS
Status: COMPLETED | OUTPATIENT
Start: 2021-03-05 | End: 2021-03-05

## 2021-03-05 RX ORDER — POTASSIUM CHLORIDE 29.8 MG/ML
20 INJECTION INTRAVENOUS
Status: COMPLETED | OUTPATIENT
Start: 2021-03-05 | End: 2021-03-05

## 2021-03-05 RX ADMIN — ENOXAPARIN SODIUM 90 MG: 100 INJECTION SUBCUTANEOUS at 13:24

## 2021-03-05 RX ADMIN — DIGOXIN 125 MCG: 0.25 INJECTION INTRAMUSCULAR; INTRAVENOUS at 09:43

## 2021-03-05 RX ADMIN — ASCORBIC ACID, VITAMIN A PALMITATE, CHOLECALCIFEROL, THIAMINE HYDROCHLORIDE, RIBOFLAVIN-5 PHOSPHATE SODIUM, PYRIDOXINE HYDROCHLORIDE, NIACINAMIDE, DEXPANTHENOL, ALPHA-TOCOPHEROL ACETATE, VITAMIN K1, FOLIC ACID, BIOTIN, CYANOCOBALAMIN: 200; 3300; 200; 6; 3.6; 6; 40; 15; 10; 150; 600; 60; 5 INJECTION, SOLUTION INTRAVENOUS at 17:58

## 2021-03-05 RX ADMIN — INSULIN LISPRO 6 UNITS: 100 INJECTION, SOLUTION INTRAVENOUS; SUBCUTANEOUS at 04:13

## 2021-03-05 RX ADMIN — MORPHINE SULFATE 2 MG: 2 INJECTION, SOLUTION INTRAMUSCULAR; INTRAVENOUS at 19:18

## 2021-03-05 RX ADMIN — INSULIN LISPRO 6 UNITS: 100 INJECTION, SOLUTION INTRAVENOUS; SUBCUTANEOUS at 12:23

## 2021-03-05 RX ADMIN — FUROSEMIDE 20 MG: 10 INJECTION, SOLUTION INTRAMUSCULAR; INTRAVENOUS at 09:39

## 2021-03-05 RX ADMIN — Medication 10 ML: at 20:49

## 2021-03-05 RX ADMIN — Medication 10 ML: at 09:44

## 2021-03-05 RX ADMIN — PANTOPRAZOLE SODIUM 40 MG: 40 INJECTION, POWDER, FOR SOLUTION INTRAVENOUS at 09:44

## 2021-03-05 RX ADMIN — POTASSIUM CHLORIDE 20 MEQ: 400 INJECTION, SOLUTION INTRAVENOUS at 12:00

## 2021-03-05 RX ADMIN — PANTOPRAZOLE SODIUM 40 MG: 40 INJECTION, POWDER, FOR SOLUTION INTRAVENOUS at 20:38

## 2021-03-05 RX ADMIN — INSULIN GLARGINE 15 UNITS: 100 INJECTION, SOLUTION SUBCUTANEOUS at 20:39

## 2021-03-05 RX ADMIN — MORPHINE SULFATE 2 MG: 2 INJECTION, SOLUTION INTRAMUSCULAR; INTRAVENOUS at 15:21

## 2021-03-05 RX ADMIN — Medication 10 ML: at 09:45

## 2021-03-05 RX ADMIN — PIPERACILLIN AND TAZOBACTAM 3375 MG: 3; .375 INJECTION, POWDER, LYOPHILIZED, FOR SOLUTION INTRAVENOUS at 19:17

## 2021-03-05 RX ADMIN — MORPHINE SULFATE 2 MG: 2 INJECTION, SOLUTION INTRAMUSCULAR; INTRAVENOUS at 10:12

## 2021-03-05 RX ADMIN — PIPERACILLIN AND TAZOBACTAM 3375 MG: 3; .375 INJECTION, POWDER, LYOPHILIZED, FOR SOLUTION INTRAVENOUS at 23:51

## 2021-03-05 RX ADMIN — POTASSIUM CHLORIDE 20 MEQ: 29.8 INJECTION, SOLUTION INTRAVENOUS at 10:12

## 2021-03-05 RX ADMIN — POTASSIUM CHLORIDE 20 MEQ: 29.8 INJECTION, SOLUTION INTRAVENOUS at 12:00

## 2021-03-05 RX ADMIN — INSULIN LISPRO 3 UNITS: 100 INJECTION, SOLUTION INTRAVENOUS; SUBCUTANEOUS at 09:54

## 2021-03-05 RX ADMIN — DIPHENHYDRAMINE HYDROCHLORIDE 12.5 MG: 50 INJECTION, SOLUTION INTRAMUSCULAR; INTRAVENOUS at 00:34

## 2021-03-05 RX ADMIN — PIPERACILLIN AND TAZOBACTAM 3375 MG: 3; .375 INJECTION, POWDER, LYOPHILIZED, FOR SOLUTION INTRAVENOUS at 00:06

## 2021-03-05 RX ADMIN — MORPHINE SULFATE 2 MG: 2 INJECTION, SOLUTION INTRAMUSCULAR; INTRAVENOUS at 00:05

## 2021-03-05 RX ADMIN — ENOXAPARIN SODIUM 90 MG: 100 INJECTION SUBCUTANEOUS at 20:38

## 2021-03-05 RX ADMIN — MORPHINE SULFATE 2 MG: 2 INJECTION, SOLUTION INTRAMUSCULAR; INTRAVENOUS at 23:48

## 2021-03-05 RX ADMIN — INSULIN LISPRO 3 UNITS: 100 INJECTION, SOLUTION INTRAVENOUS; SUBCUTANEOUS at 17:26

## 2021-03-05 RX ADMIN — INSULIN LISPRO 3 UNITS: 100 INJECTION, SOLUTION INTRAVENOUS; SUBCUTANEOUS at 00:11

## 2021-03-05 RX ADMIN — INSULIN LISPRO 3 UNITS: 100 INJECTION, SOLUTION INTRAVENOUS; SUBCUTANEOUS at 20:40

## 2021-03-05 ASSESSMENT — PAIN DESCRIPTION - PAIN TYPE
TYPE: ACUTE PAIN

## 2021-03-05 ASSESSMENT — PAIN SCALES - GENERAL
PAINLEVEL_OUTOF10: 6
PAINLEVEL_OUTOF10: 6
PAINLEVEL_OUTOF10: 0
PAINLEVEL_OUTOF10: 3
PAINLEVEL_OUTOF10: 10
PAINLEVEL_OUTOF10: 9

## 2021-03-05 ASSESSMENT — PAIN DESCRIPTION - LOCATION
LOCATION: BACK
LOCATION: ABDOMEN
LOCATION: ABDOMEN

## 2021-03-05 ASSESSMENT — PAIN DESCRIPTION - ORIENTATION: ORIENTATION: MID

## 2021-03-05 ASSESSMENT — PAIN DESCRIPTION - DESCRIPTORS: DESCRIPTORS: ACHING

## 2021-03-05 ASSESSMENT — PAIN DESCRIPTION - FREQUENCY: FREQUENCY: CONTINUOUS

## 2021-03-05 NOTE — PROGRESS NOTES
03/03/21  0534   AST 18   ALT 14   BILITOT 1.1*   ALKPHOS 153*     Amylase:    Lab Results   Component Value Date    AMYLASE 38 12/07/2015     Lipase:    Lab Results   Component Value Date    LIPASE 35.0 05/10/2020    LIPASE 72.0 04/24/2019    LIPASE 78.0 01/14/2019      Mag:    Lab Results   Component Value Date    MG 2.30 03/05/2021    MG 2.40 03/04/2021     Phos:     Lab Results   Component Value Date    PHOS 2.9 03/05/2021    PHOS 3.4 03/04/2021      Coags:   Lab Results   Component Value Date    PROTIME 15.0 03/05/2021    INR 1.29 03/05/2021    APTT 33.7 03/05/2021       Cultures:  Anaerobic culture  No results found for: LABANAE  Fungus stain  No results found for requested labs within last 30 days. Gram stain  No results found for requested labs within last 30 days. Organism  Lab Results   Component Value Date/Time    ORG Staphylococcus coagulase-negative (A) 10/01/2018 08:55 PM    ORG Staphylococcus coagulase negative DNA Detected (A) 10/01/2018 08:55 PM    ORG Staphylococcus coagulase-negative (A) 10/01/2018 08:55 PM     Surgical culture  No results found for: CXSURG  Blood culture 1  No results found for requested labs within last 30 days. Blood culture 2  No results found for requested labs within last 30 days. Fecal occult  No results found for requested labs within last 30 days. GI bacterial pathogens by PCR  No results found for requested labs within last 30 days. C. difficile  No results found for requested labs within last 30 days. Urine culture  Lab Results   Component Value Date    LABURIN  02/12/2021     <50,000 CFU/ml mixed skin/urogenital art.  No further workup       Pathology:  No relevant pathology     Imaging:  I have personally reviewed the following films:    Ct Abdomen Pelvis Wo Contrast Additional Contrast? Oral    Result Date: 3/4/2021  EXAMINATION: CT OF THE ABDOMEN AND PELVIS WITHOUT CONTRAST 3/4/2021 2:58 pm TECHNIQUE: CT of the abdomen and pelvis was performed without the administration of intravenous contrast. Multiplanar reformatted images are provided for review. Dose modulation, iterative reconstruction, and/or weight based adjustment of the mA/kV was utilized to reduce the radiation dose to as low as reasonably achievable. COMPARISON: None. HISTORY: ORDERING SYSTEM PROVIDED HISTORY: pneumatosis TECHNOLOGIST PROVIDED HISTORY: Additional Contrast?->Oral Reason for exam:->pneumatosis Reason for exam:->water soluble contrast through NG Reason for Exam: pneumatosis; water soluble contrast through NG Acuity: Unknown Type of Exam: Unknown FINDINGS: There is overall decrease in pneumoperitoneum and small bowel pneumatosis There is no free fluid. The solid organs demonstrate no acute abnormality. Overall decrease in pneumatosis and pneumoperitoneum.      Scheduled Meds:   [START ON 3/6/2021] furosemide  40 mg Intravenous Daily    insulin lispro  0-18 Units Subcutaneous Q4H    insulin glargine  28 Units Subcutaneous QAM    pantoprazole  40 mg Intravenous BID    digoxin  125 mcg Intravenous Daily    sodium chloride flush  10 mL Intravenous 2 times per day    piperacillin-tazobactam  3,375 mg Intravenous Q8H    sodium chloride flush  10 mL Intravenous 2 times per day    [START ON 3/9/2021] fat emulsion  250 mL Intravenous Once per day on Mon Thu    exenatide  10 mcg Subcutaneous BID WC    sodium chloride flush  10 mL Intravenous 2 times per day    gabapentin  300 mg Oral Nightly    montelukast  10 mg Oral Nightly     Continuous Infusions:   PN-Adult Premix 5/20 - Standard Electrolytes - Central Line 80 mL/hr at 03/04/21 1721    dextrose 100 mL/hr (03/02/21 1519)     PRN Meds:.bisacodyl, phenol, sodium chloride flush, sodium chloride flush, potassium chloride, morphine, promethazine, sodium chloride flush, promethazine **OR** ondansetron, polyethylene glycol, acetaminophen **OR** acetaminophen, glucose, dextrose, glucagon (rDNA), dextrose, albuterol sulfate HFA, oxyCODONE      Assessment:  Small bowel pneumatosis with loculated pneumoperitoneum  Acute on chronic CHF  CAD, status-post CABG  Acute on chronic kidney disease  Atrial fibrillation, on Coumadin  History of DVT/PE        Plan:  1. Symptoms improving, vital and labs stable, CT yesterday showed improvement; no plans for surgical intervention at this time, plan to repeat CT in a couple more days prior to starting diet  2. NPO with NGT decompression; monitor for bowel function  3. IV hydration as needed with TPN; monitor and correct electrolytes  4. Antibiotics  5. Activity as tolerated, ambulate TID  6. PRN analgesics and antiemetics--minimizing narcotics as tolerated  7. DVT prophylaxis with SCD's; hold Coumadin, okay to bridge with heparin/Lovenox--currently being held for bloody NGT output, GI consult pending, agree with PPI  8. Management of medical comorbid etiologies per primary team and consulting services    EDUCATION:  Educated patient on plan of care and disease process--all questions answered. Plans discussed with patient and nursing. Reviewed and discussed with Dr. David Birch. Signed:  SOLEDAD Wahl - CNP  3/5/2021 11:45 AM     I have personally performed a face to face diagnostic evaluation on this patient. I have interviewed and examined the patient and I agree with the assessment above. In summary, my findings and plan are the following:   Ms. Taylor Benoit is a 77 y.o. female who presents with   Small bowel pneumatosis with loculated pneumoperitoneum  CHF  CAD  Atrial fibrillation  History of DVT/PE  Medical coagulopathy, on coumadin     Plan:  1. Although has pneumatosis of small bowel with loculated pneumoperitoneum, vital signs are stable, lactic acid normal, without peritoneal signs or toxicity. Does not need emergent/urgent surgical exploration unless condition deteriorates.  Repeat CT 3/4/21 shows improvement, will repeat imaging with PO contrast in 1-2 days for monitoring  2. Bowel rest, NG decompression, continue TPN  3. IVF  4. Pain medication and antiemetics as needed with caution as may mask worsening exam  5. Antibiotics  6. Monitor blood in NG output, GI input noted, continue PPI, trend hemoglobin and may continue anticoagulation with heparin gtt or lovenox from surgical standpoint if anticoagulation needed  7. Defer management of remainder of medical comorbidities to primary and consulting teams    Tushar Curtis MD, FACS  3/5/2021  2:21 PM

## 2021-03-05 NOTE — PLAN OF CARE
Problem: HEMODYNAMIC STATUS  Goal: Patient has stable vital signs and fluid balance  Outcome: Ongoing  Note: VS have been stable this shift. Pt runs tachycardic, BP has been stable. On room air, stating in the upper 90s. Slight edema to the lower legs. No crackles heard on auscultation. Problem: Pain:  Goal: Control of acute pain  Description: Control of acute pain  Outcome: Ongoing  Note: Complaints of back pain, PRN morphine given as requested.

## 2021-03-05 NOTE — PROGRESS NOTES
[facility]  HEART FAILURE  Progress Note      Admit Date 2/26/2021     Reason for Consult:      Reason for Consultation/Chief Complaint: SOB    HPI:    Angie Fonseca is a 77 y.o. female with PMH HT, HLD, DM, AF s/p Maze 2018, CMP, DVT/ PE, HFrEF admitted from cardiology office with SOB and 13lb wt gain. She was diuresed 7L but was hypotensive and diuretics held. HR elevated and Dig started with some improvement. Pt with nausea and vomiting, abd CT with Small bowel pneumatosis with loculated pneumoperitoneum and pt NPO with NGT and TPN. Diuretics restarted yesterday. Subjective:  Patient is being seen for CHF. There were no acute overnight cardiac events. Today Ms. Kaden Sandhu denies chest pain or palpitations, edema with some improvement today, pt discouraged today with blood in NGT.      Baseline Weight: 198-201   Wt Readings from Last 3 Encounters:   03/04/21 201 lb 8 oz (91.4 kg)   02/26/21 213 lb (96.6 kg)   01/18/21 200 lb (90.7 kg)          Objective:   /67   Pulse 98   Temp 98.3 °F (36.8 °C) (Oral)   Resp 16   Ht 5' 8\" (1.727 m)   Wt 201 lb 8 oz (91.4 kg)   SpO2 93%   BMI 30.64 kg/m²       Intake/Output Summary (Last 24 hours) at 3/5/2021 0758  Last data filed at 3/4/2021 1813  Gross per 24 hour   Intake 0 ml   Output 550 ml   Net -550 ml      Wt Readings from Last 3 Encounters:   03/04/21 201 lb 8 oz (91.4 kg)   02/26/21 213 lb (96.6 kg)   01/18/21 200 lb (90.7 kg)      In: -   Out: 250     TELEMETRY: SR/AF    Physical Exam:  General Appearance:  Non-obese/Well Nourished  Respiratory:  · Resp Auscultation: Normal breath sounds without dullness  Cardiovascular:  · Auscultation: irregular,S1S2, +murmur  · Palpation: Normal    · Pedal Pulses: 2+ and equal   Abdomen:  · Soft, NT, ND, + bs  Extremities:  · No Cyanosis or Clubbing  · Extremities trace pitting  Neurological/Psychiatric:  · Oriented to time, place, and person  · Non-anxious    MEDICATIONS:   Scheduled Meds:   Scheduled CKMB, CKMBINDEX, TROPONINI in the last 72 hours. Invalid input(s): CKTOTAL;3  FASTING LIPID PANEL:  Lab Results   Component Value Date    HDL 40 12/23/2020    LDLDIRECT 127 08/21/2018    LDLCALC 97 12/23/2020    TRIG 94 03/03/2021    TSH 2.01 10/21/2020     LIVER PROFILE:  Lab Results   Component Value Date    AST 18 03/03/2021    AST 18 03/02/2021    ALT 14 03/03/2021    ALT 15 03/02/2021     BNP:   Lab Results   Component Value Date    PROBNP 2,059 03/05/2021    PROBNP 7,878 03/02/2021    PROBNP 5,497 02/26/2021     Iron Studies:    Lab Results   Component Value Date    FERRITIN 799.9 10/05/2018    FERRITIN 416.5 08/30/2018     Lab Results   Component Value Date    IRON 49 11/30/2020    TIBC 310 11/30/2020    FERRITIN 799.9 (H) 10/05/2018      Iron Deficiency Anemia:  No    IV Iron Therapy:  No  2017 ACC/AHA HF Guidelines:   intravenous iron replacement in patients with New York Heart Association (NYHA) class II and III HF and iron deficiency(ferritin <100 ng/ml or 100-300 ng/ml if transferrin saturation <20%), to improve functional status and QoL. 1. WEIGHT: Admit Weight: 211 lb (95.7 kg)      Today  Weight: 201 lb 8 oz (91.4 kg)   2.  I/O     Intake/Output Summary (Last 24 hours) at 3/5/2021 0758  Last data filed at 3/4/2021 1813  Gross per 24 hour   Intake 0 ml   Output 550 ml   Net -550 ml       Cardiac Testing:   Dobut ECHO:  11/30/2020   Summary   Dobutamine echocardiogram for aortic stenosis.   Very technically limited exam due to atrial fibrillation.   Baseline echocardiogram shows severe left ventricular dysfunction with   anterior, lateral and apical hypokinesis with an ejection fraction of 20-25   %.   There is no improvement in wall motion with low or high dose dobutamine suggestive of nonviable myocardium.   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 After dobutamine stress the mean AV gradient rises to 20mmHg with 20mcg of dobutamine consistent with mild to moderate aortic stenosis.   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 AHF-  BNP improved, continue IV lasix, replace potassium  2. ICM- off richard and coreg due to CKD and hypotension, Dig started this week  3. AF- rate improved, continue Dig, restart coreg at 6.25 when taking po  4.  Hypotension - improved        I appreciate the opportunity of cooperating in the care of this individual.    Rashard Washington, [facility], [address] 3/5/2021, 7:58 AM  Heart Failure  The [address], [address]  Ph: [phone]      Core Measures:   · Discharge instructions:   · LVEF documented:   · ACEI for LV dysfunction:   · Smoking Cessation:

## 2021-03-05 NOTE — PROGRESS NOTES
Pt has red drainage in NG tubing. Amt= less than 3cc Pt reports that NG has been aspirating blood \"all evening\". Unable to determine amt r/t dark drainage in suction cannister. No abdominal tenderness noted, pt does report abdominal discomfort 4/10 that she explains has been constant/unchanged. No apparent signs of active bleeding. Pt has not had a BM since 3/1 with no reports of black stools. Hayden Nichole notified with N.O for stat CBC, occult stool, hold lovenox and clamp NG. Pt aware and family at bedside aware.

## 2021-03-05 NOTE — CONSULTS
Gastroenterology Consult Note        Patient: Bandar Hernandez  : 1945  Acct#:      Date:  3/5/2021    Subjective:       History of Present Illness  Patient is a 77 y.o.  female admitted with CHF (congestive heart failure), NYHA class I, acute on chronic, combined ([facility] [I50.43] who is seen in consult for blood per NG tube. H/o CMP, CHF, CAD, CABG, afib on coumadin, DM. We have seen pt in the past for jejunal diverticulitis (), colitis and rectal bleeding felt to be hemorrhoidal (). Admitted on  for acute on chronic CHF. She had 12 pound weight gain at home. she had abdominal bloating without N/V. CT 3/2 showed pneumoperitoneum with multiple loculations and small bowel pneumatosis. She is being managed by surgery team conservatively with NG tube decompression. Overnight there was 3 cc of red blood in the NG tubing so NG was clamped. No BM for a couple days but when she was having BMs, no melena or hematochezia. No nsaids other than baby asa. On omeprazole at home.     Past Medical History:   Diagnosis Date    Asthma     Atrial fibrillation (HCC)     Eosinophilia     Hemoptysis     HIGH CHOLESTEROL     Hx of blood clots     Hypertension     Irregular heart beat     Other specified gastritis without mention of hemorrhage     Palpitations     Skin cancer     basal and squamous    Type II or unspecified type diabetes mellitus without mention of complication, not stated as uncontrolled       Past Surgical History:   Procedure Laterality Date    BRONCHOSCOPY  2016    Dr. Mena Palacio - brushings from [address] [place] [address],[facility]-  2018    Dr. Caden Soto  2017    Dr. Catherine Forrester - sigmoid diverticulosis, polypectomies x3    COLONOSCOPY  2014    Dr. Catherine Forrester - sigmoid diverticulosis, polypectomies x3, internal hemorrhoids    COLONOSCOPY  10/10/2018    w/biopsy performed by Gwendolyn Mejia MD at [address] COLONOSCOPY N/A 8/7/2020    COLONOSCOPY DIAGNOSTIC performed by Kevin Chavira MD at [address] GRAFT  08/21/2018    Dr. Donna Murry - x3 (LIMA-LAD, L SV-D1-PLV) modified BL MAZE procedure w/obliteration of WAYNE using 45mm AtriClip    HYSTERECTOMY      INSERTABLE CARDIAC MONITOR Left 08/16/2018    Dr. Keenan Dubose  Bonifacio Escalante # [id number] [employer]    MITRAL VALVE REPLACEMENT  08/21/2018    Dr. Donna Murry - 27mm [employer] Cinch tissue valve    PAIN MANAGEMENT PROCEDURE N/A 12/18/2020    C6-C7 MIDLINE  EPIDURAL STEROID INJECTION WITH FLUOROSCOPY performed by Adeline Amaya MD at [address] Bilateral 1/18/2021    BILATERAL T11 TRANSFORAMINAL EPIDURAL STEROID INJECTION WITH FLUOROSCOPY performed by Adeline Amaya MD at [address] TRANSESOPHAGEAL ECHOCARDIOGRAM  08/21/2018    during CABG/MVR    TUNNELED VENOUS CATHETER PLACEMENT Left 08/23/2018    Dr. Henrik Reynoso -  for HD---since removed    UPPER GASTROINTESTINAL ENDOSCOPY N/A 10/10/2018    w/biopsy performed by Gwendolyn Mejia MD at [facility]      Past Endoscopic History:  8/2020 with Dr Abran Ashraf for h/o polyps  IMPRESSION : Diverticulitis of large intestine w/o perforation or abscess  w/o bleeding - K57.32  Hemorrhoids, other - K64.8    EGD and colonoscopy with Dr Roxy Mcconnell 10/2018 for iron deficiency anemia and colitis on CT  Impression:    -Mild gastritis, biopsied  -Normal duodenum, biopsied      Impression:  Incomplete colonoscopy. We were only to examine up to the mid-transverse colon due to colon tortuosity and redundancy. Left sided diverticulosis. Medium sized internal and external hemorrhoids with redundant stacie-anal tissue (this is the likely cause of recent rectal bleeding).        Admission Meds  No current facility-administered medications on file prior to encounter.       Current Outpatient Medications on File Prior to Encounter Medication Sig Dispense Refill    predniSONE (DELTASONE) 10 MG tablet Take 10 mg by mouth daily      torsemide (DEMADEX) 100 MG tablet TAKE 1 TABLET DAILY 90 tablet 0    OXYCODONE HCL PO Take 10 mg by mouth As of 1/21/2021,  states patient is taking 10mg with edge being taken off her pain after 3 hours.  ACETAMINOPHEN PO Take 500 mg by mouth every 6 hours as needed       blood glucose test strips (FREESTYLE LITE) strip USE TO TEST FOUR TIMES A  strip 4    methocarbamol (ROBAXIN) 750 MG tablet Take 750 mg by mouth as needed (back pain)      spironolactone (ALDACTONE) 50 MG tablet TAKE 1 TABLET DAILY 90 tablet 3    metOLazone (ZAROXOLYN) 2.5 MG tablet Take 1 tablet by mouth daily (Patient taking differently: Take 2.5 mg by mouth daily as needed ) 30 tablet 0    gabapentin (NEURONTIN) 300 MG capsule Take 1 capsule by mouth nightly for 90 days.  Intended supply: 30 days 90 capsule 0    albuterol sulfate  (90 Base) MCG/ACT inhaler USE 2 INHALATIONS EVERY 6 HOURS AS NEEDED FOR WHEEZING 25.5 g 2    carvedilol (COREG) 25 MG tablet Take 1 tablet by mouth 2 times daily 270 tablet 3    potassium chloride (KLOR-CON M) 10 MEQ extended release tablet TAKE 1 TABLET DAILY 90 tablet 1    insulin glargine (LANTUS SOLOSTAR) 100 UNIT/ML injection pen INJECT 50 UNITS IN THE MORNING AND 24 UNITS AT BEDTIME (Patient taking differently: every morning 30 units in the morning and PRN at night if needed) 90 mL 1    albuterol (PROVENTIL) (2.5 MG/3ML) 0.083% nebulizer solution Take 3 mLs by nebulization every 6 hours as needed for Wheezing 120 each 3    polyethylene glycol (GLYCOLAX) 17 GM/SCOOP powder Take 17 g by mouth every other day       omeprazole (PRILOSEC) 20 MG delayed release capsule Take 20 mg by mouth daily      FreeStyle Lancets MISC 1 each by Does not apply route 4 times daily 200 each 5    ondansetron (ZOFRAN) 4 MG tablet Take 1 tablet by mouth 3 times daily as needed for Nausea or cough and shortness of breath   Cardiovascular: negative for chest pain and dyspnea   Gastrointestinal: see hpi   Genitourinary:negative for dysuria and frequency   Integument/breast: negative for pruritus and rash   Hematologic/lymphatic: negative for lymphadenopathy and easy bruising   Musculoskeletal:negative for arthralgias and myalgias   Neurological: negative for dizziness and weakness           Physical Exam  Blood pressure 120/67, pulse 98, temperature 98.3 °F (36.8 °C), temperature source Oral, resp. rate 16, height 5' 8\" (1.727 m), weight 201 lb 8 oz (91.4 kg), SpO2 93 %, not currently breastfeeding. General appearance: alert, cooperative, no distress, appears stated age  Eyes: Anicteric  Head: Normocephalic, without obvious abnormality  Lungs: clear to auscultation bilaterally, Normal Effort  Heart: regular rate and rhythm, normal S1 and S2, + murmur   Abdomen: soft, mild periumbilical tenderness. Bowel sounds normal. No masses,  no organomegaly. Extremities: atraumatic, no cyanosis or edema  Skin: warm and dry, no jaundice  Neuro: Grossly intact, A&OX3  Musculoskeletal: 5/5  strength BUE      Data Review:    Recent Labs     03/04/21 0537 03/04/21 2046 03/05/21  0656   WBC 5.5 5.8 5.2   HGB 11.4* 11.2* 10.5*   HCT 35.7* 35.0* 32.6*   MCV 87.5 86.5 85.9    155 141     Recent Labs     03/03/21  0534 03/04/21 0537 03/05/21  0657    137 137   K 3.6 3.5 3.1*   CL 97* 99 101   CO2 31 32 32   PHOS 3.7 3.4 2.9   BUN 42* 33* 33*   CREATININE 1.4* 1.1 1.0     Recent Labs     03/03/21  0534   AST 18   ALT 14   BILITOT 1.1*   ALKPHOS 153*     No results for input(s): LIPASE, AMYLASE in the last 72 hours. Recent Labs     03/03/21  0534 03/04/21 0537 03/05/21  0656   PROTIME 22.8* 22.4* 15.0*   INR 1.95* 1.92* 1.29*     No results for input(s): PTT in the last 72 hours. No results for input(s): OCCULTBLD in the last 72 hours.     Imaging Studies:                 CT-scan of abdomen and pelvis wo contrast 3/2/21      Impression   1. Pneumoperitoneum with multiple loculations identified, most likely benign   pneumoperitoneum related to rupture of small bowel pneumatosis. 2. Diverticulosis without scan evidence for diverticulitis. 3. Enlarging left ovarian cyst, now measuring 9.1 cm.  MRI or surgical   evaluation is recommended. CT abd/pelvis w PO contrast 3/4/21  Impression   Overall decrease in pneumatosis and pneumoperitoneum.                          Assessment:     Active Problems:    Pneumoperitoneum    Pneumatosis intestinalis of small intestine    CHF (congestive heart failure), NYHA class I, acute on chronic, combined (HCC)    Stage 3 chronic kidney disease    Coronary artery disease involving native heart without angina pectoris  Resolved Problems:    * No resolved hospital problems. *    Blood per NG tube - small volume red blood overnight. NG aspirated at bedside this am with green bilious aspirate with one eunice of red blood. Suspect scant bleeding was from NG trauma. hgb was 11.2 yesterday and is 10.5 today. Small bowel pneumatosis with pneumoperitoneum - surgery managing. CHF  Afib - coumadin held. Was on lovenox but this was held d/t blood per NG tube. Recommendations:   - PPI IV BID - normal mag  - NG to LIWS  - hold off on EGD in setting of pneumoperitoneum and no acitve bleed with stable hgb  - lovenox ok from GI standpoint if needed as long as hgb remains stable    Discussed with Dr. Claudean Mead, PAAlmaC  [facility]    I have personally performed a face to face diagnostic evaluation on this patient. I have interviewed and examined the patient and I agree with the findings and recommended plan of care.   In summary, my findings and plan are the following: scant blood via ng, abd mild diffuse tender, with pneumotosis/pneumoperitoneum and ng lavage no signif blood I agree scant blood was likley ng trauma and larger picture pt needs ng suction, therefore rec cont ng to low intermittent wall suction and ppi. As long as hbg remains stable ok for lovenox if needed for afib. Hold off egd with no signif gi bleed in setting pneumotosis/pneumoperitoneum. Discused with pt and family. Will sign off, call if needed.      Arnold Clark MD  [address] [facility]

## 2021-03-05 NOTE — PROGRESS NOTES
Occupational Therapy   Occupational Therapy Initial Assessment/Discharge Summary    Date: 3/5/2021   Patient Name: Shary Severe  MRN: [id number]     : 1945    Date of Service: 3/5/2021    Discharge Recommendations:  Shary Severe scored a 24/24 on the AM-PAC ADL Inpatient form. At this time, no further OT is recommended upon discharge due to pt at baseline level of occupational function. Recommend patient returns to prior setting with assist as needed. OT Equipment Recommendations  Equipment Needed: No    Assessment   Assessment: Pt is at her baseline level of occupational function. She is indepenent with ADLs and functional mobility. No further OT needs. Decision Making: Low Complexity  History: Pt 78 yo, lives w/, I ADLs,  performans IADLs, I ambulation. No falls. PMH: HTN, blood clots Dm, A-fib, CABG   Exam: ROM, MMT, 6 clicks, no performance deficits, stable presentation. Assistance / Modification: None  OT Education: OT Role  Patient Education: D/C recommendation. Pt independently verbalized understanding. Barriers to Learning: None  REQUIRES OT FOLLOW UP: No  Activity Tolerance  Activity Tolerance: Patient Tolerated treatment well  Safety Devices  Safety Devices in place: Yes  Type of devices: All fall risk precautions in place;Call light within reach; Left in chair;Nurse notified           Patient Diagnosis(es): There were no encounter diagnoses. has a past medical history of Asthma, Atrial fibrillation ([facility] Eosinophilia, Hemoptysis, HIGH CHOLESTEROL, Hx of blood clots, Hypertension, Irregular heart beat, Other specified gastritis without mention of hemorrhage, Palpitations, Skin cancer, and Type II or unspecified type diabetes mellitus without mention of complication, not stated as uncontrolled. has a past surgical history that includes Cholecystectomy;  section; Colonoscopy (2017); skin biopsy; bronchoscopy (2016);  Coronary artery bypass graft (08/21/2018); Mitral valve replacement (08/21/2018); transesophageal echocardiogram (08/21/2018); Tunneled venous catheter placement (Left, 08/23/2018); Cardiac catheterization (08/16/2018); Insertable Cardiac Monitor (Left, 08/16/2018); Colonoscopy (01/17/2014); Hysterectomy; Upper gastrointestinal endoscopy (N/A, 10/10/2018); Colonoscopy (10/10/2018); Colonoscopy (N/A, 8/7/2020); Pain management procedure (N/A, 12/18/2020); and Pain management procedure (Bilateral, 1/18/2021). Restrictions  Restrictions/Precautions  Restrictions/Precautions: Fall Risk(Medium fall risk; NPO; NG tube)  Required Braces or Orthoses?: No  Position Activity Restriction  Other position/activity restrictions: Kingston Duran is a 77 y.o. female who presented from cardiology office. Patient was seen in the cardiology because of worsening shortness of breath and weight gain. She did gain about 12 to 13 pounds in a week's time. She does mention that she has been compliant with her medications but not so compliant with her salt intake. She denies any recent chest pain palpitations. She denies any fever cough phlegm diarrhea constipation trouble pain burning sensation while peeing one-sided weakness    Subjective   General  Chart Reviewed: Yes  Family / Caregiver Present: Yes()  Diagnosis: CHF  Subjective  Subjective: Pt supine in bed, agreeable to OT eval, with encouragment. Pt reports 9/10 pain in back. RN notified.   Patient Currently in Pain: Yes  Pain Assessment  Pain Assessment: 0-10  Pain Level: 10  Pain Type: Acute pain  Pain Location: Back  Pain Orientation: Mid  Pre Treatment Pain Screening  Intervention List: Patient able to continue with treatment;Nurse called to administer meds  Vital Signs  Patient Currently in Pain: Yes  Social/Functional History  Social/Functional History  Lives With: Spouse  Type of Home: House  Home Layout: One level, Laundry in basement, Able to Live on Main level with bedroom/bathroom, Performs ADL's on one level  Home Access: Stairs to enter with rails  Entrance Stairs - Number of Steps: 2 HARLEY  Entrance Stairs - Rails: Both  Bathroom Shower/Tub: Tub/Shower unit, Shower chair without back  Bathroom Toilet: Handicap height  Bathroom Equipment: Shower chair, Hand-held shower  ADL Assistance: Independent(Pt and  endorse independence with ADL's but state  assists to reduce workload on pt. \"I wash her back, but she does the rest\")  Homemaking Assistance: ( has primarily been completing due to CHF)  Homemaking Responsibilities: No  Ambulation Assistance: Independent  Transfer Assistance: Independent  Active : Yes  Leisure & Hobbies: Reading, watching TV  Additional Comments: Pt denies recent falls       Objective   Vision: Impaired  Vision Exceptions: Wears glasses for reading  Hearing: Within functional limits    Orientation  Overall Orientation Status: Within Normal Limits  Observation/Palpation  Posture: Good  Observation: NG tube present  Balance  Sitting Balance: Independent  Standing Balance: Independent  Standing Balance  Time: ~30 sec, 3-4 min  Activity: functional mobility to bathroom for toilet transfer; clothing mgt, wash hands at sink, functional mobility ~80 ft in dan & to recliner  Comment: At times in room, pt pushed IV pole. Functional Mobility  Functional - Mobility Device: No device  Activity: To/from bathroom  Assist Level:  Independent  Toilet Transfers  Toilet - Technique: Ambulating  Equipment Used: Standard bedside commode(over toilet)  Toilet Transfer: Modified independent  ADL  Feeding: NPO  Grooming: Independent(wash hands in stance at sink)  LE Dressing: Independent(don/doff socks, clothing mgt during toileting)  Toileting: Independent  Tone RUE  RUE Tone: Normotonic  Tone LUE  LUE Tone: Normotonic  Coordination  Movements Are Fluid And Coordinated: Yes     Bed mobility  Supine to Sit: Modified independent(HOB elevated)  Scooting: Independent  Transfers  Stand Step Transfers: Independent  Sit to stand: Independent  Stand to sit: Independent  Vision - Basic Assessment  Prior Vision: Wears glasses only for reading  Visual History: No significant visual history  Patient Visual Report: No visual complaint reported. Cognition  Overall Cognitive Status: WNL  Perception  Overall Perceptual Status: WFL     Sensation  Overall Sensation Status: WFL        LUE PROM (degrees)  LUE PROM: WFL  LUE AROM (degrees)  LUE AROM : WFL  Left Hand AROM (degrees)  Left Hand AROM: WNL  RUE AROM (degrees)  RUE AROM : WFL  Right Hand AROM (degrees)  Right Hand AROM: WNL  LUE Strength  Gross LUE Strength: (NT d/t UE precaution)  L Hand General: 5/5  RUE Strength  Gross RUE Strength:  WNL  R Hand General: 5/5                   Plan   Plan  Plan Comment: D/C acute OT    AM-PAC Score        AM-[facility] Inpatient Daily Activity Raw Score: 24 (03/05/21 1550)  AM-PAC Inpatient ADL T-Scale Score : 57.54 (03/05/21 1550)  ADL Inpatient CMS 0-100% Score: 0 (03/05/21 1550)  ADL Inpatient CMS G-Code Modifier : CH (03/05/21 1550)    Goals: None          Therapy Time   Individual Concurrent Group Co-treatment   Time In 1459         Time Out 1539         Minutes 40               Timed Code Treatment Minutes:  25 min    Total Treatment Minutes:  40 min    ANIA/ Dawson [address], Annie 5422, Gabrielle Hung., OTR/L, [id number]

## 2021-03-05 NOTE — SIGNIFICANT EVENT
Hospitalist    Notifed by nurse edelmira red blood in N/G tubing. N/G cannister has dark emesis. Stat H& H ordered and N/G clamped. Hgb stable at 11.2. Vitals stable. Borderline low BP at times. Afebrile. Pt denies any change with pain      A/P  Possible GI bleed. Dc lovenox for now. Serial Hgb and Hct. Increase protonix to IV BID. N/G clamped for now as long as pain or vomiting does not increase.     GI consult in AM.     Dana Guzman NP

## 2021-03-05 NOTE — PROGRESS NOTES
Clinical Pharmacy Note    Pharmacy consulted by Dr. Giselle Parsons to manage TPN    Current TPN rate: 80ml/hr  Goal TPN rate: 80ml/hr    Labs:  General Labs:  BMP:    Lab Results   Component Value Date     03/05/2021    K 3.1 03/05/2021    K 3.1 03/02/2021     03/05/2021    CO2 32 03/05/2021    BUN 33 03/05/2021    LABALBU 2.6 03/03/2021    CREATININE 1.0 03/05/2021    CALCIUM 8.7 03/05/2021    GFRAA >60 03/05/2021    LABGLOM 54 03/05/2021    LABGLOM 45 12/06/2018    GLUCOSE 183 03/05/2021     Blood sugars: 151-235    Electrolyte replacement as follows:   Replace potassium with 40 mEq of potassium chloride administered IV over 2 hours    Blood sugar management:  Plan to continue q4 hour Humalog to high dose sliding scale. Lantus dose was increased starting this morning - will continue to monitor. Plan to continue TPN at goal of 80 ml/hr. Thank you for allowing pharmacy to participate in the care of this patient.     Kendrick Gee, PharmD 3/5/2021

## 2021-03-05 NOTE — PROGRESS NOTES
[facility]    3/5/2021 9:24 AM        Name: Bobby Romero . Admitted: 2/26/2021  Primary Care Provider: Augie Alas MD (Tel: [phone])                        Subjective:  . Patient lying in bed, had edelmira blood in n/g tube, has dark emesis.  hgb stbale this monring no chest pain  Reviewed interval ancillary notes    Current Medications      potassium chloride 20 mEq/50 mL IVPB (Central Line), Q1H      insulin lispro (1 Unit Dial) 0-18 Units, Q4H      insulin glargine (LANTUS;BASAGLAR) injection pen 28 Units, QAM      furosemide (LASIX) injection 20 mg, Daily      PN-Adult Premix 5/20 - Standard Electrolytes - Central Line, Continuous TPN      pantoprazole (PROTONIX) injection 40 mg, BID      digoxin (LANOXIN) injection 125 mcg, Daily      bisacodyl (DULCOLAX) suppository 10 mg, Daily PRN      phenol 1.4 % mouth spray 1 spray, Q2H PRN      sodium chloride flush 0.9 % injection 10 mL, 2 times per day      sodium chloride flush 0.9 % injection 10 mL, PRN      piperacillin-tazobactam (ZOSYN) 3,375 mg in dextrose 5 % 50 mL IVPB extended infusion (mini-bag), Q8H      sodium chloride flush 0.9 % injection 10 mL, 2 times per day      sodium chloride flush 0.9 % injection 10 mL, PRN      potassium chloride 20 mEq/50 mL IVPB (Central Line), PRN      [START ON 3/9/2021] fat emulsion 20 % infusion 250 mL, Once per day on Mon Thu      morphine (PF) injection 2 mg, Q4H PRN      promethazine (PHENERGAN) injection 12.5 mg, Q6H PRN      exenatide (BYETTA) injection 10 mcg -- PATIENT SUPPLIED, BID WC      sodium chloride flush 0.9 % injection 10 mL, 2 times per day      sodium chloride flush 0.9 % injection 10 mL, PRN      promethazine (PHENERGAN) tablet 12.5 mg, Q6H PRN    Or      ondansetron (ZOFRAN) injection 4 mg, Q6H PRN      polyethylene glycol (GLYCOLAX) packet 17 g, Daily PRN      acetaminophen (TYLENOL) tablet 650 mg, Q6H PRN    Or      acetaminophen (TYLENOL) suppository 650 mg, Q6H PRN      glucose (GLUTOSE) 40 % oral gel 15 g, PRN      dextrose 50 % IV solution, PRN      glucagon (rDNA) injection 1 mg, PRN      dextrose 5 % solution, PRN      albuterol sulfate  (90 Base) MCG/ACT inhaler 2 puff, Q6H PRN      gabapentin (NEURONTIN) capsule 300 mg, Nightly      montelukast (SINGULAIR) tablet 10 mg, Nightly      oxyCODONE (ROXICODONE) immediate release tablet 10 mg, Q3H PRN        Objective:  /67   Pulse 98   Temp 98.3 °F (36.8 °C) (Oral)   Resp 16   Ht 5' 8\" (1.727 m)   Wt 201 lb 8 oz (91.4 kg)   SpO2 93%   BMI 30.64 kg/m²     Intake/Output Summary (Last 24 hours) at 3/5/2021 0924  Last data filed at 3/4/2021 1813  Gross per 24 hour   Intake 0 ml   Output 550 ml   Net -550 ml      Wt Readings from Last 3 Encounters:   03/04/21 201 lb 8 oz (91.4 kg)   02/26/21 213 lb (96.6 kg)   01/18/21 200 lb (90.7 kg)       General appearance:  Appears comfortable  Eyes: Sclera clear. Pupils equal.  ENT: Moist oral mucosa. Trachea midline  Cardiovascular: Irregularly irregular no murmurs appreciated  Respiratory: mild bibasilar crackles no wheezing  GI: Soft mild diffuse tenderness to palpation  no guarding no rigidity bowel sounds positive    Neurology: no gross focal deficits  Ext: 1+ edema  Psych: Normal affect.  Alert and oriented in time, place and person  Skin: Warm, dry, normal turgor    Labs and Tests:  CBC:   Recent Labs     03/04/21  0537 03/04/21 2046 03/05/21  0656   WBC 5.5 5.8 5.2   HGB 11.4* 11.2* 10.5*    155 141     BMP:    Recent Labs     03/03/21  0534 03/04/21  0537 03/05/21  0657    137 137   K 3.6 3.5 3.1*   CL 97* 99 101   CO2 31 32 32   BUN 42* 33* 33*   CREATININE 1.4* 1.1 1.0   GLUCOSE 154* 192* 183*     Hepatic:   Recent Labs     03/03/21  0534   AST 18   ALT 14   BILITOT 1.1*   ALKPHOS 153* Discussed care with family and patient             Spent 30  minutes with patient and family at bedside and on unit reviewing medical records and labs, spent greater than 50% time counseling patient and family on diagnosis and plan   Problem List  Active Problems:    Pneumoperitoneum    Pneumatosis intestinalis of small intestine    CHF (congestive heart failure), NYHA class I, acute on chronic, combined ([facility]    Stage 3 chronic kidney disease    Coronary artery disease involving native heart without angina pectoris  Resolved Problems:    * No resolved hospital problems. *       Assessment & Plan:   67-year-old female who was admitted for weight gain of 13 pounds found to have acute on chronic CHF exacerbation. Patient has been diuresing with IV Lasix here. Found to have bump in creatinine with renal failure with creatinine of 1.5 cardiology and nephrology has been following.       Small bowel pneumatosis with loculated pneumoperitoneum  - npo  - iv atbx  -tpn  - ng tube for decompression  - surgery on board    ?acute GI bleed  - stop lovenox,  - protonix iv bid, gi consult, repeat hgb at 12    Acute on chronic systolic CHF  - start on lasix 20mg IV      Hypokalemia  - improved    TORIBIO on CKD3:   - stable    Chronic A. Fib  - continue meds, cards on board  - coumadin on hold, lhold lovenox for now        Diet: Diet NPO Effective Now  PN-Adult Premix 5/20 - Standard Electrolytes - Central Line  Code:Full Code    Galilea Dominguez MD   3/5/2021 9:24 AM

## 2021-03-05 NOTE — PROGRESS NOTES
Nephrology Progress Note  [phone]   [email or web address]    Patient:  Jose Rafael Marie   : 1945    Brief HPI    76 yo woman with h/o Hypertension, DM II, CAD/CABG, s/p MVR prosthetic,  HPL, Afib on warfarin, h/o DVT/PE on warfarin, GERD presented from cardiology office with sob and weight gain.    A Dobutamine echo in 2020 with LVEF of 20-25%  Admitted with decompensated CHF      Subjective/Interval history    Pt seen and examined  Creatinine continues to improve  Hypokalemic this AM  BPs low normal  On room air  C/o rash in the legs with itching    Review of Systems   No abdominal pain or nausea/emesis  No fevers/chills    SHx:   visitors at the bed-side    Meds:  Scheduled Meds:   insulin lispro  0-18 Units Subcutaneous Q4H    insulin glargine  28 Units Subcutaneous QAM    furosemide  20 mg Intravenous Daily    pantoprazole  40 mg Intravenous BID    digoxin  125 mcg Intravenous Daily    sodium chloride flush  10 mL Intravenous 2 times per day    piperacillin-tazobactam  3,375 mg Intravenous Q8H    sodium chloride flush  10 mL Intravenous 2 times per day    [START ON 3/9/2021] fat emulsion  250 mL Intravenous Once per day on Mon Thu    exenatide  10 mcg Subcutaneous BID WC    sodium chloride flush  10 mL Intravenous 2 times per day    gabapentin  300 mg Oral Nightly    montelukast  10 mg Oral Nightly     Continuous Infusions:   PN-Adult Premix  - Standard Electrolytes - Central Line 80 mL/hr at 21 1721    dextrose 100 mL/hr (21 1519)     PRN Meds:.bisacodyl, phenol, sodium chloride flush, sodium chloride flush, potassium chloride, morphine, promethazine, sodium chloride flush, promethazine **OR** ondansetron, polyethylene glycol, acetaminophen **OR** acetaminophen, glucose, dextrose, glucagon (rDNA), dextrose, albuterol sulfate HFA, oxyCODONE      Vitals:  /83   Pulse 103   Temp 98.2 °F (36.8 °C) (Oral)   Resp 16   Ht 5' 8\" (1.727 m)   Wt 201 lb 8 oz Clear 02/28/2021    SPECGRAV 1.010 02/28/2021    LEUKOCYTESUR Negative 02/28/2021    UROBILINOGEN 1.0 02/28/2021    BILIRUBINUR Negative 02/28/2021    BLOODU Negative 02/28/2021    GLUCOSEU Negative 02/28/2021       Assessment/Plan:    1. Acute Kidney injury  Possibly pre-renal/diuretic use in the setting of severe LV dysfunction  Base-line creatinine low 1s  Creatinine on admission was 1.5, peaked at 1.9 and is at base-line now  Diuretics resumed  Monitor in the setting of diuresis. 2. CHF   Continue lasix IV, increase to 40 mg IV qdaily  3. Hypokalemia replete  Mag levels normal  Adjust TPN    4. Atrial fibrillation rate controlled  5. Small bowel pneumatosis with loculated pneumoperitoneum  On TPN  S/p NGT for bowel decompression        Fredi Guardado MD.  3/5/2021  Office Phone : [phone]    Thank you for allowing us to participate in the care of this pt. I willcontinue to follow along. Please call with questions or concerns.

## 2021-03-06 LAB
ANION GAP SERPL CALCULATED.3IONS-SCNC: 9 MMOL/L (ref 3–16)
BASOPHILS ABSOLUTE: 0 K/UL (ref 0–0.2)
BASOPHILS RELATIVE PERCENT: 0.5 %
BUN BLDV-MCNC: 29 MG/DL (ref 7–20)
CALCIUM SERPL-MCNC: 9 MG/DL (ref 8.3–10.6)
CHLORIDE BLD-SCNC: 103 MMOL/L (ref 99–110)
CO2: 27 MMOL/L (ref 21–32)
CREAT SERPL-MCNC: 0.9 MG/DL (ref 0.6–1.2)
EOSINOPHILS ABSOLUTE: 0.8 K/UL (ref 0–0.6)
EOSINOPHILS RELATIVE PERCENT: 11.2 %
GFR AFRICAN AMERICAN: >60
GFR NON-AFRICAN AMERICAN: >60
GLUCOSE BLD-MCNC: 119 MG/DL (ref 70–99)
GLUCOSE BLD-MCNC: 153 MG/DL (ref 70–99)
GLUCOSE BLD-MCNC: 157 MG/DL (ref 70–99)
GLUCOSE BLD-MCNC: 165 MG/DL (ref 70–99)
GLUCOSE BLD-MCNC: 197 MG/DL (ref 70–99)
GLUCOSE BLD-MCNC: 203 MG/DL (ref 70–99)
GLUCOSE BLD-MCNC: 221 MG/DL (ref 70–99)
HCT VFR BLD CALC: 35.8 % (ref 36–48)
HCT VFR BLD CALC: 36.4 % (ref 36–48)
HEMOGLOBIN: 11.3 G/DL (ref 12–16)
HEMOGLOBIN: 11.5 G/DL (ref 12–16)
INR BLD: 1.22 (ref 0.86–1.14)
LYMPHOCYTES ABSOLUTE: 0.7 K/UL (ref 1–5.1)
LYMPHOCYTES RELATIVE PERCENT: 10.6 %
MAGNESIUM: 2.2 MG/DL (ref 1.8–2.4)
MCH RBC QN AUTO: 27.4 PG (ref 26–34)
MCHC RBC AUTO-ENTMCNC: 31.6 G/DL (ref 31–36)
MCV RBC AUTO: 86.5 FL (ref 80–100)
MONOCYTES ABSOLUTE: 0.7 K/UL (ref 0–1.3)
MONOCYTES RELATIVE PERCENT: 10.6 %
NEUTROPHILS ABSOLUTE: 4.5 K/UL (ref 1.7–7.7)
NEUTROPHILS RELATIVE PERCENT: 67.1 %
PDW BLD-RTO: 16.7 % (ref 12.4–15.4)
PERFORMED ON: ABNORMAL
PHOSPHORUS: 3.1 MG/DL (ref 2.5–4.9)
PLATELET # BLD: 172 K/UL (ref 135–450)
PMV BLD AUTO: 8.2 FL (ref 5–10.5)
POTASSIUM SERPL-SCNC: 3.9 MMOL/L (ref 3.5–5.1)
PROTHROMBIN TIME: 14.2 SEC (ref 10–13.2)
RBC # BLD: 4.2 M/UL (ref 4–5.2)
SODIUM BLD-SCNC: 139 MMOL/L (ref 136–145)
WBC # BLD: 6.7 K/UL (ref 4–11)

## 2021-03-06 PROCEDURE — 99233 SBSQ HOSP IP/OBS HIGH 50: CPT | Performed by: NURSE PRACTITIONER

## 2021-03-06 PROCEDURE — 6360000002 HC RX W HCPCS: Performed by: NURSE PRACTITIONER

## 2021-03-06 PROCEDURE — APPSS15 APP SPLIT SHARED TIME 0-15 MINUTES: Performed by: NURSE PRACTITIONER

## 2021-03-06 PROCEDURE — 99232 SBSQ HOSP IP/OBS MODERATE 35: CPT | Performed by: SURGERY

## 2021-03-06 PROCEDURE — 1200000000 HC SEMI PRIVATE

## 2021-03-06 PROCEDURE — 85610 PROTHROMBIN TIME: CPT

## 2021-03-06 PROCEDURE — APPNB30 APP NON BILLABLE TIME 0-30 MINS: Performed by: NURSE PRACTITIONER

## 2021-03-06 PROCEDURE — 83735 ASSAY OF MAGNESIUM: CPT

## 2021-03-06 PROCEDURE — 85014 HEMATOCRIT: CPT

## 2021-03-06 PROCEDURE — 84100 ASSAY OF PHOSPHORUS: CPT

## 2021-03-06 PROCEDURE — C9113 INJ PANTOPRAZOLE SODIUM, VIA: HCPCS | Performed by: NURSE PRACTITIONER

## 2021-03-06 PROCEDURE — 6370000000 HC RX 637 (ALT 250 FOR IP): Performed by: NURSE PRACTITIONER

## 2021-03-06 PROCEDURE — 2580000003 HC RX 258: Performed by: INTERNAL MEDICINE

## 2021-03-06 PROCEDURE — 6360000002 HC RX W HCPCS: Performed by: INTERNAL MEDICINE

## 2021-03-06 PROCEDURE — 36415 COLL VENOUS BLD VENIPUNCTURE: CPT

## 2021-03-06 PROCEDURE — 2500000003 HC RX 250 WO HCPCS: Performed by: SURGERY

## 2021-03-06 PROCEDURE — 2580000003 HC RX 258: Performed by: NURSE PRACTITIONER

## 2021-03-06 PROCEDURE — 80048 BASIC METABOLIC PNL TOTAL CA: CPT

## 2021-03-06 PROCEDURE — 85025 COMPLETE CBC W/AUTO DIFF WBC: CPT

## 2021-03-06 PROCEDURE — 85018 HEMOGLOBIN: CPT

## 2021-03-06 RX ORDER — DIPHENHYDRAMINE HYDROCHLORIDE 50 MG/ML
12.5 INJECTION INTRAMUSCULAR; INTRAVENOUS EVERY 6 HOURS PRN
Status: DISCONTINUED | OUTPATIENT
Start: 2021-03-06 | End: 2021-03-10 | Stop reason: HOSPADM

## 2021-03-06 RX ORDER — CALCIUM CARBONATE 200(500)MG
500 TABLET,CHEWABLE ORAL 3 TIMES DAILY PRN
Status: DISCONTINUED | OUTPATIENT
Start: 2021-03-06 | End: 2021-03-10 | Stop reason: HOSPADM

## 2021-03-06 RX ADMIN — MORPHINE SULFATE 2 MG: 2 INJECTION, SOLUTION INTRAMUSCULAR; INTRAVENOUS at 22:14

## 2021-03-06 RX ADMIN — INSULIN LISPRO 3 UNITS: 100 INJECTION, SOLUTION INTRAVENOUS; SUBCUTANEOUS at 00:22

## 2021-03-06 RX ADMIN — INSULIN LISPRO 6 UNITS: 100 INJECTION, SOLUTION INTRAVENOUS; SUBCUTANEOUS at 05:09

## 2021-03-06 RX ADMIN — LEUCINE, PHENYLALANINE, LYSINE, METHIONINE, ISOLEUCINE, VALINE, HISTIDINE, THREONINE, TRYPTOPHAN, ALANINE, GLYCINE, ARGININE, PROLINE, SERINE, TYROSINE, SODIUM ACETATE, DIBASIC POTASSIUM PHOSPHATE, MAGNESIUM CHLORIDE, SODIUM CHLORIDE, CALCIUM CHLORIDE, DEXTROSE
365; 280; 290; 200; 300; 290; 240; 210; 90; 1035; 515; 575; 340; 250; 20; 340; 261; 51; 59; 33; 20 INJECTION INTRAVENOUS at 19:17

## 2021-03-06 RX ADMIN — INSULIN GLARGINE 15 UNITS: 100 INJECTION, SOLUTION SUBCUTANEOUS at 22:20

## 2021-03-06 RX ADMIN — PANTOPRAZOLE SODIUM 40 MG: 40 INJECTION, POWDER, FOR SOLUTION INTRAVENOUS at 22:14

## 2021-03-06 RX ADMIN — MORPHINE SULFATE 2 MG: 2 INJECTION, SOLUTION INTRAMUSCULAR; INTRAVENOUS at 15:09

## 2021-03-06 RX ADMIN — FUROSEMIDE 40 MG: 10 INJECTION, SOLUTION INTRAMUSCULAR; INTRAVENOUS at 10:50

## 2021-03-06 RX ADMIN — DIGOXIN 125 MCG: 0.25 INJECTION INTRAMUSCULAR; INTRAVENOUS at 10:50

## 2021-03-06 RX ADMIN — INSULIN LISPRO 3 UNITS: 100 INJECTION, SOLUTION INTRAVENOUS; SUBCUTANEOUS at 11:28

## 2021-03-06 RX ADMIN — ALTEPLASE 1 MG: 2.2 INJECTION, POWDER, LYOPHILIZED, FOR SOLUTION INTRAVENOUS at 18:13

## 2021-03-06 RX ADMIN — ENOXAPARIN SODIUM 90 MG: 100 INJECTION SUBCUTANEOUS at 11:09

## 2021-03-06 RX ADMIN — MORPHINE SULFATE 2 MG: 2 INJECTION, SOLUTION INTRAMUSCULAR; INTRAVENOUS at 18:13

## 2021-03-06 RX ADMIN — INSULIN GLARGINE 15 UNITS: 100 INJECTION, SOLUTION SUBCUTANEOUS at 11:29

## 2021-03-06 RX ADMIN — ANTACID TABLETS 500 MG: 500 TABLET, CHEWABLE ORAL at 15:09

## 2021-03-06 RX ADMIN — PIPERACILLIN AND TAZOBACTAM 3375 MG: 3; .375 INJECTION, POWDER, LYOPHILIZED, FOR SOLUTION INTRAVENOUS at 11:59

## 2021-03-06 RX ADMIN — PIPERACILLIN AND TAZOBACTAM 3375 MG: 3; .375 INJECTION, POWDER, LYOPHILIZED, FOR SOLUTION INTRAVENOUS at 22:19

## 2021-03-06 RX ADMIN — Medication 10 ML: at 11:11

## 2021-03-06 RX ADMIN — INSULIN LISPRO 6 UNITS: 100 INJECTION, SOLUTION INTRAVENOUS; SUBCUTANEOUS at 18:14

## 2021-03-06 RX ADMIN — MORPHINE SULFATE 2 MG: 2 INJECTION, SOLUTION INTRAMUSCULAR; INTRAVENOUS at 10:01

## 2021-03-06 RX ADMIN — ENOXAPARIN SODIUM 90 MG: 100 INJECTION SUBCUTANEOUS at 22:14

## 2021-03-06 RX ADMIN — PANTOPRAZOLE SODIUM 40 MG: 40 INJECTION, POWDER, FOR SOLUTION INTRAVENOUS at 10:43

## 2021-03-06 RX ADMIN — Medication 10 ML: at 11:12

## 2021-03-06 ASSESSMENT — PAIN SCALES - GENERAL
PAINLEVEL_OUTOF10: 3
PAINLEVEL_OUTOF10: 8
PAINLEVEL_OUTOF10: 7
PAINLEVEL_OUTOF10: 4
PAINLEVEL_OUTOF10: 8

## 2021-03-06 ASSESSMENT — PAIN DESCRIPTION - PAIN TYPE
TYPE: ACUTE PAIN
TYPE: CHRONIC PAIN
TYPE: CHRONIC PAIN

## 2021-03-06 ASSESSMENT — PAIN DESCRIPTION - LOCATION
LOCATION: BACK
LOCATION: BACK

## 2021-03-06 NOTE — PROGRESS NOTES
Nephrology Progress Note  [phone]   [email or web address]    Patient:  Brody Lindo   : 1945    Brief HPI    78 yo woman with h/o Hypertension, DM II, CAD/CABG, s/p MVR prosthetic,  HPL, Afib on warfarin, h/o DVT/PE on warfarin, GERD presented from cardiology office with sob and weight gain. A Dobutamine echo in 2020 with LVEF of 20-25%  Admitted with decompensated CHF      Interval History (Chart/Data reviewed): NG tube removed and feeling better. Good UOP.  BUN/SCr stable. On RA. Discussed with primary and remains NPO until CT scan. Will transition to PO meds when able. Updated at bedside: Patient, RN, Daughter, MD.  Past medical, family, and social histories were reviewed as previously documented. Updates were made as necessary. Review of Systems ROS with pertinent positives and negatives listed in interval history.         Meds:  Scheduled Meds:   furosemide  40 mg Intravenous Daily    insulin glargine  15 Units Subcutaneous BID    enoxaparin  1 mg/kg Subcutaneous BID    insulin lispro  0-18 Units Subcutaneous Q4H    pantoprazole  40 mg Intravenous BID    digoxin  125 mcg Intravenous Daily    sodium chloride flush  10 mL Intravenous 2 times per day    piperacillin-tazobactam  3,375 mg Intravenous Q8H    sodium chloride flush  10 mL Intravenous 2 times per day    [START ON 3/9/2021] fat emulsion  250 mL Intravenous Once per day on Mon Thu    exenatide  10 mcg Subcutaneous BID WC    sodium chloride flush  10 mL Intravenous 2 times per day    gabapentin  300 mg Oral Nightly    montelukast  10 mg Oral Nightly     Continuous Infusions:   PN-Adult Premix  - Standard Electrolytes - Central Line 80 mL/hr at 21 1758    dextrose 100 mL/hr (21 1519)     PRN Meds:.calcium carbonate, diphenhydrAMINE, bisacodyl, phenol, sodium chloride flush, sodium chloride flush, potassium chloride, morphine, promethazine, sodium chloride flush, promethazine **OR** found for: IONCA  Magnesium:    Lab Results   Component Value Date    MG 2.30 03/05/2021     Phosphorus:    Lab Results   Component Value Date    PHOS 2.9 03/05/2021     Last 3 Troponin:    Lab Results   Component Value Date    TROPONINI <0.01 05/10/2020    TROPONINI <0.01 05/09/2020    TROPONINI <0.01 08/29/2019     U/A:    Lab Results   Component Value Date    COLORU YELLOW 02/28/2021    PROTEINU Negative 02/28/2021    PHUR 7.0 02/28/2021    WBCUA 31 05/11/2020    RBCUA neg 02/12/2021    RBCUA 1 05/11/2020    YEAST neg 02/12/2021    BACTERIA trace 02/12/2021    BACTERIA 1+ 05/11/2020    CLARITYU Clear 02/28/2021    SPECGRAV 1.010 02/28/2021    LEUKOCYTESUR Negative 02/28/2021    UROBILINOGEN 1.0 02/28/2021    BILIRUBINUR Negative 02/28/2021    BLOODU Negative 02/28/2021    GLUCOSEU Negative 02/28/2021       Assessment/Plan:    1. Acute Kidney injury  Possibly pre-renal/diuretic use in the setting of severe LV dysfunction  Base-line creatinine low 1s  Creatinine on admission was 1.5, peaked at 1.9 and is at base-line now  Diuretics resumed  Monitor in the setting of diuresis. 2. CHF   Continue lasix IV, continued 40 mg IV qdaily  3. Hypokalemia replete  Mag levels normal    4. Atrial fibrillation rate controlled  5.  Small bowel pneumatosis with loculated pneumoperitoneum  On TPN  S/p NGT for bowel decompression; removed 3/6/21      Recommendations:  Stable and doing well on current IV Lasix 40 mg daily  Home regimen was Torsemide 100 mg daily/Spironolacton 50 mg daily/MMetolazone 2.5 mg daily  Unsure adherence/absorbtion of this as extremely high dose and diuresing well on just Lasix 40 mg IVP daily currently which is equivalent to 40 mg Torsemide daily; will confirm with her tomorrow when able to take PO  For now will leave on current regimen Lasix 40 mg IVP     Likely dry weight 200 lb / 90 kg     Admit Wt: Weight: 211 lb (95.7 kg)   Todays Wt: Weight: 201 lb 8 oz (91.4 kg)     Most Recent Wt: Weight: 201 lb 8 oz (91.4 kg)          Yuni Koroma MD.  3/6/2021  Office Phone : [phone]    Thank you for allowing us to participate in the care of this pt. I willcontinue to follow along. Please call with questions or concerns.

## 2021-03-06 NOTE — PROGRESS NOTES
Clinical Pharmacy Note    Pharmacy consulted by Dr. David Birch to manage TPN    Current TPN rate: 80ml/hr  Goal TPN rate: 80ml/hr    Labs:  General Labs:  BMP:    Lab Results   Component Value Date     03/06/2021    K 3.9 03/06/2021     03/06/2021    CO2 27 03/06/2021    BUN 29 03/06/2021    LABALBU 2.6 03/03/2021    CREATININE 0.9 03/06/2021    CALCIUM 9.0 03/06/2021    GFRAA >60 03/06/2021    LABGLOM >60 03/06/2021    LABGLOM 45 12/06/2018    GLUCOSE 165 03/06/2021     Blood sugars: 153-203    Blood sugar management:  Plan to continue q4 hour Humalog to high dose sliding scale. Plan to continue TPN at goal of 80 ml/hr. Thank you for allowing pharmacy to participate in the care of this patient.     Kris Suarez, PharmD 3/6/2021

## 2021-03-06 NOTE — PROGRESS NOTES
PRN Meds:bisacodyl, phenol, sodium chloride flush, sodium chloride flush, potassium chloride, morphine, promethazine, sodium chloride flush, promethazine **OR** ondansetron, polyethylene glycol, acetaminophen **OR** acetaminophen, glucose, dextrose, glucagon (rDNA), dextrose, albuterol sulfate HFA, oxyCODONE       Objective: Wt Readings from Last 3 Encounters:   21 201 lb 8 oz (91.4 kg)   21 213 lb (96.6 kg)   21 200 lb (90.7 kg)   Admit weight: Weight: 211 lb (95.7 kg)      Temperature range over 24hrs:   Temp  Av.9 °F (36.6 °C)  Min: 97.4 °F (36.3 °C)  Max: 98.3 °F (36.8 °C)  Current Respiratory Rate:  Resp: 16  Current Pulse:  Pulse: 95  Current Blood Pressure:  BP: 123/77  24hr Blood Pressure Range:  Systolic (17SMS), XAR:004 , Min:118 , DIV:743   ; Diastolic (81TPH), BFZ:59, Min:34, Max:80    Current Pulse Oximetry:  SpO2: 95 %      Intake/Output Summary (Last 24 hours) at 3/6/2021 1037  Last data filed at 3/5/2021 1521  Gross per 24 hour   Intake 40 ml   Output 375 ml   Net -335 ml       Telemetry monitor:  Sinus rhythm    Physical Exam:  General:  Awake, alert, NAD  Skin:  Warm and dry  Neck:  No JVD  Chest:  Clear to auscultation, respiration easy  Cardiovascular:  RRR 80 S1S2 no murmur to auscultation  Abdomen:   Bowel sounds hypoactive, abd soft, non-tender  Extremities:  Trace billie LE edema Rt > Lt  : unremarkable      Imaging      Lab Review     Renal Profile:   Lab Results   Component Value Date    CREATININE 1.0 2021    BUN 33 2021     2021    K 3.1 2021    K 3.1 2021     2021    CO2 32 2021     CBC:    Lab Results   Component Value Date    WBC 5.2 2021    RBC 3.80 2021    HGB 11.1 2021    HCT 35.4 2021    MCV 85.9 2021    RDW 17.0 2021     2021     BNP:    Lab Results   Component Value Date     2020     Fasting Lipid Panel:    Lab Results   Component Value Date    CHOL 149 12/23/2020    HDL 40 12/23/2020    TRIG 94 03/03/2021     Cardiac Enzymes:    Lab Results   Component Value Date    TROPONINI <0.01 05/10/2020     PT/ INR   Lab Results   Component Value Date    INR 1.29 03/05/2021    INR 1.92 03/04/2021    INR 1.95 03/03/2021    PROTIME 15.0 03/05/2021    PROTIME 22.4 03/04/2021    PROTIME 22.8 03/03/2021     PTT No results found for: PTT   Lab Results   Component Value Date    MG 2.30 03/05/2021      Lab Results   Component Value Date    TSH 2.01 10/21/2020       Assessment/Plan:     Patient Active Problem List   Diagnosis    Long term current use of anticoagulant    Hypercholesteremia    Generalized osteoarthrosis, involving multiple sites    Eosinophilic gastritis    Paroxysmal SVT [facility]supraventricular tachycardia) [facility])    B12 deficiency    History of pulmonary embolism    Multiple pulmonary nodules    Type 2 diabetes mellitus with hyperglycemia, with long-term current use of insulin ([facility])    Asthma    Pneumoperitoneum    PAF [facility]oxysmal atrial fibrillation) ([facility] [address]    Hypertension    Cardiac arrhythmia    Encounter for loop recorder check    Coronary artery disease due to lipid rich plaque    Nonrheumatic mitral valve regurgitation    S/P CABG x 3    Goiter    Primary osteoarthritis of both knees    Splenic infarct    Obesity, Class I, BMI 30-34.9    Chronic combined systolic (congestive) and diastolic (congestive) heart failure ([facility])    Thoracic degenerative disc disease    Persistent atrial fibrillation ([facility])    S/P MVR (mitral valve repair)    Ischemic cardiomyopathy    Occlusion and stenosis of bilateral carotid arteries    Pneumatosis intestinalis of small intestine    Nonrheumatic aortic valve stenosis    DVT (deep vein thrombosis) in pregnancy    CHF (congestive heart failure), NYHA class I, acute on chronic, combined ([facility])    Stage 3 chronic kidney disease    Coronary artery disease involving native heart without angina

## 2021-03-06 NOTE — PROGRESS NOTES
03/04/21  0537 03/05/21  0657    137   K 3.5 3.1*   CL 99 101   CO2 32 32   BUN 33* 33*   CREATININE 1.1 1.0   GLUCOSE 192* 183*     Hepatic:    No results for input(s): AST, ALT, ALB, BILITOT, ALKPHOS in the last 72 hours. Amylase:    Lab Results   Component Value Date    AMYLASE 38 12/07/2015     Lipase:    Lab Results   Component Value Date    LIPASE 35.0 05/10/2020    LIPASE 72.0 04/24/2019    LIPASE 78.0 01/14/2019      Mag:    Lab Results   Component Value Date    MG 2.30 03/05/2021    MG 2.40 03/04/2021     Phos:     Lab Results   Component Value Date    PHOS 2.9 03/05/2021    PHOS 3.4 03/04/2021      Coags:   Lab Results   Component Value Date    PROTIME 15.0 03/05/2021    INR 1.29 03/05/2021    APTT 33.7 03/05/2021       Cultures:  Anaerobic culture  No results found for: LABANAE  Fungus stain  No results found for requested labs within last 30 days. Gram stain  No results found for requested labs within last 30 days. Organism  Lab Results   Component Value Date/Time    ORG Staphylococcus coagulase-negative (A) 10/01/2018 08:55 PM    ORG Staphylococcus coagulase negative DNA Detected (A) 10/01/2018 08:55 PM    ORG Staphylococcus coagulase-negative (A) 10/01/2018 08:55 PM     Surgical culture  No results found for: CXSURG  Blood culture 1  No results found for requested labs within last 30 days. Blood culture 2  No results found for requested labs within last 30 days. Fecal occult  No results found for requested labs within last 30 days. GI bacterial pathogens by PCR  No results found for requested labs within last 30 days. C. difficile  No results found for requested labs within last 30 days. Urine culture  Lab Results   Component Value Date    LABURIN  02/12/2021     <50,000 CFU/ml mixed skin/urogenital art.  No further workup       Pathology:  No relevant pathology     Imaging:  I have personally reviewed the following films:    Ct Abdomen Pelvis Wo Contrast Additional Contrast? Oral    Result Date: 3/4/2021  EXAMINATION: CT OF THE ABDOMEN AND PELVIS WITHOUT CONTRAST 3/4/2021 2:58 pm TECHNIQUE: CT of the abdomen and pelvis was performed without the administration of intravenous contrast. Multiplanar reformatted images are provided for review. Dose modulation, iterative reconstruction, and/or weight based adjustment of the mA/kV was utilized to reduce the radiation dose to as low as reasonably achievable. COMPARISON: None. HISTORY: ORDERING SYSTEM PROVIDED HISTORY: pneumatosis TECHNOLOGIST PROVIDED HISTORY: Additional Contrast?->Oral Reason for exam:->pneumatosis Reason for exam:->water soluble contrast through NG Reason for Exam: pneumatosis; water soluble contrast through NG Acuity: Unknown Type of Exam: Unknown FINDINGS: There is overall decrease in pneumoperitoneum and small bowel pneumatosis There is no free fluid. The solid organs demonstrate no acute abnormality. Overall decrease in pneumatosis and pneumoperitoneum.      Scheduled Meds:   furosemide  40 mg Intravenous Daily    insulin glargine  15 Units Subcutaneous BID    enoxaparin  1 mg/kg Subcutaneous BID    insulin lispro  0-18 Units Subcutaneous Q4H    pantoprazole  40 mg Intravenous BID    digoxin  125 mcg Intravenous Daily    sodium chloride flush  10 mL Intravenous 2 times per day    piperacillin-tazobactam  3,375 mg Intravenous Q8H    sodium chloride flush  10 mL Intravenous 2 times per day    [START ON 3/9/2021] fat emulsion  250 mL Intravenous Once per day on Mon Thu    exenatide  10 mcg Subcutaneous BID WC    sodium chloride flush  10 mL Intravenous 2 times per day    gabapentin  300 mg Oral Nightly    montelukast  10 mg Oral Nightly     Continuous Infusions:   PN-Adult Premix 5/20 - Standard Electrolytes - Central Line 80 mL/hr at 03/05/21 1758    dextrose 100 mL/hr (03/02/21 1519)     PRN Meds:.bisacodyl, phenol, sodium chloride flush, sodium chloride flush, potassium chloride, morphine, promethazine, sodium chloride flush, promethazine **OR** ondansetron, polyethylene glycol, acetaminophen **OR** acetaminophen, glucose, dextrose, glucagon (rDNA), dextrose, albuterol sulfate HFA, oxyCODONE      Assessment:  Small bowel pneumatosis with loculated pneumoperitoneum  Acute on chronic CHF  CAD, status-post CABG  Acute on chronic kidney disease  Atrial fibrillation, on Coumadin  History of DVT/PE        Plan:  1. Abdominal pain minimal, complaints of heartburn--add PRN Tums, continue PPI; last CT showed improvement; no plans for surgical intervention at this time, anticipate repeat CT in a day or two prior to starting diet  2. Remove NGT, continue NPO with sips; monitor for bowel function--passing stool  3. IV hydration as needed with TPN; monitor and correct electrolytes  4. Antibiotics  5. Activity as tolerated, ambulate TID  6. PRN analgesics and antiemetics--minimizing narcotics as tolerated, add Benadryl for lower extremity pruritis  7. DVT prophylaxis with SCD's; hold Coumadin, okay to bridge with heparin/Lovenox  8. Management of medical comorbid etiologies per primary team and consulting services    EDUCATION:  Educated patient on plan of care and disease process--all questions answered. Plans discussed with patient and nursing. Reviewed and discussed with Dr. Cameron Boyce. Signed:  SOLEDAD Marquez - CNP  3/6/2021 10:51 AM     Patient seen and examined  81-year-old female with rectal pneumatosis as well as loculated pneumoperitoneum  She is not ill-appearing and has no complaints of abdominal pain  Her NG tube had almost fallen out.   It was removed as she was having minimal drainage  No abdominal tenderness on physical examination  Okay for ice chips and a few popsicles  Continue IV antibiotics and TPN  Follow abdominal examination clinically  Anticipate repeat CAT scan in the next 24 to 48 hours

## 2021-03-06 NOTE — PROGRESS NOTES
[facility] PROGRESS NOTE    3/6/2021 3:41 PM        Name: Samm Myers . Admitted: 2/26/2021  Primary Care Provider: Lissette Mendez MD (Tel: [phone])                        Subjective:  .     NG tube removed feeling better did have bowel movements edema improving  Reviewed interval ancillary notes    Current Medications  calcium carbonate (TUMS) chewable tablet 500 mg, TID PRN  diphenhydrAMINE (BENADRYL) injection 12.5 mg, Q6H PRN  alteplase (CATHFLO) injection 1 mg, Once  PN-Adult Premix 5/20 - Standard Electrolytes - Central Line, Continuous TPN  furosemide (LASIX) injection 40 mg, Daily  PN-Adult Premix 5/20 - Standard Electrolytes - Central Line, Continuous TPN  insulin glargine (LANTUS;BASAGLAR) injection pen 15 Units, BID  enoxaparin (LOVENOX) injection 90 mg, BID  insulin lispro (1 Unit Dial) 0-18 Units, Q4H  pantoprazole (PROTONIX) injection 40 mg, BID  digoxin (LANOXIN) injection 125 mcg, Daily  bisacodyl (DULCOLAX) suppository 10 mg, Daily PRN  phenol 1.4 % mouth spray 1 spray, Q2H PRN  sodium chloride flush 0.9 % injection 10 mL, 2 times per day  sodium chloride flush 0.9 % injection 10 mL, PRN  piperacillin-tazobactam (ZOSYN) 3,375 mg in dextrose 5 % 50 mL IVPB extended infusion (mini-bag), Q8H  sodium chloride flush 0.9 % injection 10 mL, 2 times per day  sodium chloride flush 0.9 % injection 10 mL, PRN  potassium chloride 20 mEq/50 mL IVPB (Central Line), PRN  [START ON 3/9/2021] fat emulsion 20 % infusion 250 mL, Once per day on Mon Thu  morphine (PF) injection 2 mg, Q4H PRN  promethazine (PHENERGAN) injection 12.5 mg, Q6H PRN  exenatide (BYETTA) injection 10 mcg -- PATIENT SUPPLIED, BID WC  sodium chloride flush 0.9 % injection 10 mL, 2 times per day  sodium chloride flush 0.9 % injection 10 mL, PRN  promethazine (PHENERGAN) tablet 12.5 mg, Q6H PRN Or  ondansetron (ZOFRAN) injection 4 mg, Q6H PRN  polyethylene glycol (GLYCOLAX) packet 17 g, Daily PRN  acetaminophen (TYLENOL) tablet 650 mg, Q6H PRN    Or  acetaminophen (TYLENOL) suppository 650 mg, Q6H PRN  glucose (GLUTOSE) 40 % oral gel 15 g, PRN  dextrose 50 % IV solution, PRN  glucagon (rDNA) injection 1 mg, PRN  dextrose 5 % solution, PRN  albuterol sulfate  (90 Base) MCG/ACT inhaler 2 puff, Q6H PRN  gabapentin (NEURONTIN) capsule 300 mg, Nightly  montelukast (SINGULAIR) tablet 10 mg, Nightly  oxyCODONE (ROXICODONE) immediate release tablet 10 mg, Q3H PRN        Objective:  /66   Pulse 96   Temp 97.9 °F (36.6 °C) (Oral)   Resp 18   Ht 5' 8\" (1.727 m)   Wt 201 lb 8 oz (91.4 kg)   SpO2 98%   BMI 30.64 kg/m²   No intake or output data in the 24 hours ending 03/06/21 1541   Wt Readings from Last 3 Encounters:   03/04/21 201 lb 8 oz (91.4 kg)   02/26/21 213 lb (96.6 kg)   01/18/21 200 lb (90.7 kg)       General appearance:  Appears comfortable  Eyes: Sclera clear. Pupils equal.  ENT: Moist oral mucosa. Trachea midline  Cardiovascular: Irregularly irregular no murmurs appreciated  Respiratory: ctab no wheezing  GI: Soft NT  no guarding no rigidity bowel sounds positive    Neurology: no gross focal deficits  Ext: 1+ edema  Psych: Normal affect. Alert and oriented in time, place and person  Skin: Warm, dry, normal turgor    Labs and Tests:  CBC:   Recent Labs     03/04/21  2046 03/05/21  0656 03/05/21  1155 03/05/21  2112 03/06/21  1135   WBC 5.8 5.2  --   --  6.7   HGB 11.2* 10.5* 10.8* 11.1* 11.5*    141  --   --  172     BMP:    Recent Labs     03/04/21  0537 03/05/21  0657 03/06/21  1135    137 139   K 3.5 3.1* 3.9   CL 99 101 103   CO2 32 32 27   BUN 33* 33* 29*   CREATININE 1.1 1.0 0.9   GLUCOSE 192* 183* 165*     Hepatic:   No results for input(s): AST, ALT, ALB, BILITOT, ALKPHOS in the last 72 hours.     Discussed care with family and patient             Spent 30  minutes with patient and family at bedside and on unit reviewing medical records and labs, spent greater than 50% time counseling patient and family on diagnosis and plan   Problem List  Active Problems:    Pneumoperitoneum    Pneumatosis intestinalis of small intestine    CHF (congestive heart failure), NYHA class I, acute on chronic, combined ([facility] [address]    Stage 3 chronic kidney disease    Coronary artery disease involving native heart without angina pectoris  Resolved Problems:    * No resolved hospital problems. *       Assessment & Plan:   81-year-old female who was admitted for weight gain of 13 pounds found to have acute on chronic CHF exacerbation. Patient has been diuresing with IV Lasix here. Found to have bump in creatinine with renal failure with creatinine of 1.5 cardiology and nephrology has been following.       Small bowel pneumatosis with loculated pneumoperitoneum  - npo  - iv atbx  -tpn  - surgery on board    ?acute GI bleed  - seen by gi clear for ac as long hgb stable continue protonix     Acute on chronic systolic CHF  - 32AU IV lasix      Hypokalemia  - improved    TORIBIO on CKD3:   - stable    Chronic A. Fib  - continue meds, cards on board  - coumadin on hold,on lovenox        Diet: PN-Adult Premix 5/20 - Standard Electrolytes - Central Line  Diet NPO Effective Now Exceptions are: Ice Chips, Popsicles  PN-Adult Premix 5/20 - Standard Electrolytes - Central Line  Code:Full Code    Fred Donnelly MD   3/6/2021 3:41 PM

## 2021-03-06 NOTE — PROGRESS NOTES
Checked on pt and found blood in ng tube. Ng clamped and md([facility] office) called. NG checked burp heard, No s/s of respitory problems.

## 2021-03-07 ENCOUNTER — APPOINTMENT (OUTPATIENT)
Dept: CT IMAGING | Age: 76
DRG: 393 | End: 2021-03-07
Attending: INTERNAL MEDICINE
Payer: MEDICARE

## 2021-03-07 LAB
ANION GAP SERPL CALCULATED.3IONS-SCNC: 6 MMOL/L (ref 3–16)
BASOPHILS ABSOLUTE: 0 K/UL (ref 0–0.2)
BASOPHILS RELATIVE PERCENT: 0.4 %
BUN BLDV-MCNC: 25 MG/DL (ref 7–20)
CALCIUM SERPL-MCNC: 8.3 MG/DL (ref 8.3–10.6)
CHLORIDE BLD-SCNC: 100 MMOL/L (ref 99–110)
CO2: 30 MMOL/L (ref 21–32)
CREAT SERPL-MCNC: 1 MG/DL (ref 0.6–1.2)
EOSINOPHILS ABSOLUTE: 0.7 K/UL (ref 0–0.6)
EOSINOPHILS RELATIVE PERCENT: 11.4 %
GFR AFRICAN AMERICAN: >60
GFR NON-AFRICAN AMERICAN: 54
GLUCOSE BLD-MCNC: 147 MG/DL (ref 70–99)
GLUCOSE BLD-MCNC: 180 MG/DL (ref 70–99)
GLUCOSE BLD-MCNC: 198 MG/DL (ref 70–99)
GLUCOSE BLD-MCNC: 200 MG/DL (ref 70–99)
GLUCOSE BLD-MCNC: 210 MG/DL (ref 70–99)
GLUCOSE BLD-MCNC: 218 MG/DL (ref 70–99)
GLUCOSE BLD-MCNC: 226 MG/DL (ref 70–99)
HCT VFR BLD CALC: 33.8 % (ref 36–48)
HCT VFR BLD CALC: 34.2 % (ref 36–48)
HEMOGLOBIN: 10.8 G/DL (ref 12–16)
HEMOGLOBIN: 9.9 G/DL (ref 12–16)
LYMPHOCYTES ABSOLUTE: 0.7 K/UL (ref 1–5.1)
LYMPHOCYTES RELATIVE PERCENT: 11.2 %
MAGNESIUM: 1.9 MG/DL (ref 1.8–2.4)
MCH RBC QN AUTO: 27.9 PG (ref 26–34)
MCHC RBC AUTO-ENTMCNC: 31.9 G/DL (ref 31–36)
MCV RBC AUTO: 87.4 FL (ref 80–100)
MONOCYTES ABSOLUTE: 0.7 K/UL (ref 0–1.3)
MONOCYTES RELATIVE PERCENT: 11.7 %
NEUTROPHILS ABSOLUTE: 4 K/UL (ref 1.7–7.7)
NEUTROPHILS RELATIVE PERCENT: 65.3 %
PDW BLD-RTO: 17.2 % (ref 12.4–15.4)
PERFORMED ON: ABNORMAL
PHOSPHORUS: 2.3 MG/DL (ref 2.5–4.9)
PLATELET # BLD: 160 K/UL (ref 135–450)
PMV BLD AUTO: 8.1 FL (ref 5–10.5)
POTASSIUM SERPL-SCNC: 3 MMOL/L (ref 3.5–5.1)
RBC # BLD: 3.87 M/UL (ref 4–5.2)
REASON FOR REJECTION: NORMAL
REJECTED TEST: NORMAL
SODIUM BLD-SCNC: 136 MMOL/L (ref 136–145)
WBC # BLD: 6.1 K/UL (ref 4–11)

## 2021-03-07 PROCEDURE — APPSS15 APP SPLIT SHARED TIME 0-15 MINUTES: Performed by: NURSE PRACTITIONER

## 2021-03-07 PROCEDURE — 2500000003 HC RX 250 WO HCPCS: Performed by: SURGERY

## 2021-03-07 PROCEDURE — 6360000002 HC RX W HCPCS: Performed by: SURGERY

## 2021-03-07 PROCEDURE — 6360000002 HC RX W HCPCS: Performed by: INTERNAL MEDICINE

## 2021-03-07 PROCEDURE — 6360000004 HC RX CONTRAST MEDICATION

## 2021-03-07 PROCEDURE — 80048 BASIC METABOLIC PNL TOTAL CA: CPT

## 2021-03-07 PROCEDURE — 99232 SBSQ HOSP IP/OBS MODERATE 35: CPT | Performed by: NURSE PRACTITIONER

## 2021-03-07 PROCEDURE — 83735 ASSAY OF MAGNESIUM: CPT

## 2021-03-07 PROCEDURE — 6360000002 HC RX W HCPCS: Performed by: NURSE PRACTITIONER

## 2021-03-07 PROCEDURE — 2580000003 HC RX 258: Performed by: INTERNAL MEDICINE

## 2021-03-07 PROCEDURE — 85018 HEMOGLOBIN: CPT

## 2021-03-07 PROCEDURE — 1200000000 HC SEMI PRIVATE

## 2021-03-07 PROCEDURE — 74177 CT ABD & PELVIS W/CONTRAST: CPT

## 2021-03-07 PROCEDURE — C9113 INJ PANTOPRAZOLE SODIUM, VIA: HCPCS | Performed by: NURSE PRACTITIONER

## 2021-03-07 PROCEDURE — 6360000004 HC RX CONTRAST MEDICATION: Performed by: NURSE PRACTITIONER

## 2021-03-07 PROCEDURE — 2580000003 HC RX 258: Performed by: NURSE PRACTITIONER

## 2021-03-07 PROCEDURE — 85025 COMPLETE CBC W/AUTO DIFF WBC: CPT

## 2021-03-07 PROCEDURE — 84100 ASSAY OF PHOSPHORUS: CPT

## 2021-03-07 PROCEDURE — 85014 HEMATOCRIT: CPT

## 2021-03-07 PROCEDURE — 99232 SBSQ HOSP IP/OBS MODERATE 35: CPT | Performed by: SURGERY

## 2021-03-07 PROCEDURE — APPNB30 APP NON BILLABLE TIME 0-30 MINS: Performed by: NURSE PRACTITIONER

## 2021-03-07 RX ORDER — POTASSIUM CHLORIDE 29.8 MG/ML
20 INJECTION INTRAVENOUS
Status: COMPLETED | OUTPATIENT
Start: 2021-03-07 | End: 2021-03-07

## 2021-03-07 RX ADMIN — LEUCINE, PHENYLALANINE, LYSINE, METHIONINE, ISOLEUCINE, VALINE, HISTIDINE, THREONINE, TRYPTOPHAN, ALANINE, GLYCINE, ARGININE, PROLINE, SERINE, TYROSINE, SODIUM ACETATE, DIBASIC POTASSIUM PHOSPHATE, MAGNESIUM CHLORIDE, SODIUM CHLORIDE, CALCIUM CHLORIDE, DEXTROSE
365; 280; 290; 200; 300; 290; 240; 210; 90; 1035; 515; 575; 340; 250; 20; 340; 261; 51; 59; 33; 20 INJECTION INTRAVENOUS at 20:41

## 2021-03-07 RX ADMIN — MORPHINE SULFATE 2 MG: 2 INJECTION, SOLUTION INTRAMUSCULAR; INTRAVENOUS at 13:03

## 2021-03-07 RX ADMIN — PANTOPRAZOLE SODIUM 40 MG: 40 INJECTION, POWDER, FOR SOLUTION INTRAVENOUS at 20:41

## 2021-03-07 RX ADMIN — MORPHINE SULFATE 2 MG: 2 INJECTION, SOLUTION INTRAMUSCULAR; INTRAVENOUS at 21:37

## 2021-03-07 RX ADMIN — ENOXAPARIN SODIUM 90 MG: 100 INJECTION SUBCUTANEOUS at 20:40

## 2021-03-07 RX ADMIN — INSULIN GLARGINE 18 UNITS: 100 INJECTION, SOLUTION SUBCUTANEOUS at 20:43

## 2021-03-07 RX ADMIN — POTASSIUM CHLORIDE 20 MEQ: 29.8 INJECTION, SOLUTION INTRAVENOUS at 17:45

## 2021-03-07 RX ADMIN — INSULIN LISPRO 6 UNITS: 100 INJECTION, SOLUTION INTRAVENOUS; SUBCUTANEOUS at 00:02

## 2021-03-07 RX ADMIN — INSULIN GLARGINE 15 UNITS: 100 INJECTION, SOLUTION SUBCUTANEOUS at 09:09

## 2021-03-07 RX ADMIN — DIGOXIN 125 MCG: 0.25 INJECTION INTRAMUSCULAR; INTRAVENOUS at 09:05

## 2021-03-07 RX ADMIN — Medication 10 ML: at 21:00

## 2021-03-07 RX ADMIN — DIPHENHYDRAMINE HYDROCHLORIDE 12.5 MG: 50 INJECTION, SOLUTION INTRAMUSCULAR; INTRAVENOUS at 21:37

## 2021-03-07 RX ADMIN — INSULIN LISPRO 6 UNITS: 100 INJECTION, SOLUTION INTRAVENOUS; SUBCUTANEOUS at 20:42

## 2021-03-07 RX ADMIN — IOPAMIDOL 75 ML: 755 INJECTION, SOLUTION INTRAVENOUS at 13:55

## 2021-03-07 RX ADMIN — Medication 10 ML: at 20:41

## 2021-03-07 RX ADMIN — INSULIN LISPRO 3 UNITS: 100 INJECTION, SOLUTION INTRAVENOUS; SUBCUTANEOUS at 06:30

## 2021-03-07 RX ADMIN — MORPHINE SULFATE 2 MG: 2 INJECTION, SOLUTION INTRAMUSCULAR; INTRAVENOUS at 17:45

## 2021-03-07 RX ADMIN — PIPERACILLIN AND TAZOBACTAM 3375 MG: 3; .375 INJECTION, POWDER, LYOPHILIZED, FOR SOLUTION INTRAVENOUS at 20:41

## 2021-03-07 RX ADMIN — ENOXAPARIN SODIUM 90 MG: 100 INJECTION SUBCUTANEOUS at 09:04

## 2021-03-07 RX ADMIN — PIPERACILLIN AND TAZOBACTAM 3375 MG: 3; .375 INJECTION, POWDER, LYOPHILIZED, FOR SOLUTION INTRAVENOUS at 13:03

## 2021-03-07 RX ADMIN — INSULIN LISPRO 3 UNITS: 100 INJECTION, SOLUTION INTRAVENOUS; SUBCUTANEOUS at 17:06

## 2021-03-07 RX ADMIN — FUROSEMIDE 40 MG: 10 INJECTION, SOLUTION INTRAMUSCULAR; INTRAVENOUS at 09:04

## 2021-03-07 RX ADMIN — IOHEXOL: 240 INJECTION, SOLUTION INTRATHECAL; INTRAVASCULAR; INTRAVENOUS; ORAL at 12:00

## 2021-03-07 RX ADMIN — INSULIN LISPRO 6 UNITS: 100 INJECTION, SOLUTION INTRAVENOUS; SUBCUTANEOUS at 11:47

## 2021-03-07 RX ADMIN — PANTOPRAZOLE SODIUM 40 MG: 40 INJECTION, POWDER, FOR SOLUTION INTRAVENOUS at 09:04

## 2021-03-07 RX ADMIN — Medication 10 ML: at 20:42

## 2021-03-07 RX ADMIN — Medication 10 ML: at 09:05

## 2021-03-07 RX ADMIN — PIPERACILLIN AND TAZOBACTAM 3375 MG: 3; .375 INJECTION, POWDER, LYOPHILIZED, FOR SOLUTION INTRAVENOUS at 06:19

## 2021-03-07 RX ADMIN — INSULIN LISPRO 6 UNITS: 100 INJECTION, SOLUTION INTRAVENOUS; SUBCUTANEOUS at 09:09

## 2021-03-07 RX ADMIN — POTASSIUM CHLORIDE 20 MEQ: 29.8 INJECTION, SOLUTION INTRAVENOUS at 17:04

## 2021-03-07 ASSESSMENT — PAIN DESCRIPTION - LOCATION
LOCATION: ABDOMEN
LOCATION: ABDOMEN

## 2021-03-07 ASSESSMENT — PAIN SCALES - GENERAL
PAINLEVEL_OUTOF10: 4
PAINLEVEL_OUTOF10: 9
PAINLEVEL_OUTOF10: 5
PAINLEVEL_OUTOF10: 2
PAINLEVEL_OUTOF10: 3
PAINLEVEL_OUTOF10: 4
PAINLEVEL_OUTOF10: 4
PAINLEVEL_OUTOF10: 8

## 2021-03-07 ASSESSMENT — PAIN DESCRIPTION - DESCRIPTORS: DESCRIPTORS: ACHING

## 2021-03-07 ASSESSMENT — PAIN DESCRIPTION - PAIN TYPE: TYPE: ACUTE PAIN

## 2021-03-07 NOTE — PROGRESS NOTES
Clinical Pharmacy Note    Pharmacy consulted by Dr. Carlyn Mckenna to manage TPN    Current TPN rate: 80ml/hr  Goal TPN rate: 80ml/hr    Labs:  General Labs:  BMP:    Lab Results   Component Value Date     03/06/2021    K 3.9 03/06/2021    K 3.1 03/02/2021     03/06/2021    CO2 27 03/06/2021    BUN 29 03/06/2021    LABALBU 2.6 03/03/2021    CREATININE 0.9 03/06/2021    CALCIUM 9.0 03/06/2021    GFRAA >60 03/06/2021    LABGLOM >60 03/06/2021    LABGLOM 45 12/06/2018    GLUCOSE 165 03/06/2021     Blood sugars: 119-226    Blood sugar management:  Plan to continue q4 hour Humalog to high dose sliding scale. Plan to continue TPN at goal of 80 ml/hr. Thank you for allowing pharmacy to participate in the care of this patient.     Jaquelin Leon, PharmD 3/6/2021

## 2021-03-07 NOTE — PROGRESS NOTES
[facility]    3/7/2021 11:26 AM        Name: Shary Severe . Admitted: 2/26/2021  Primary Care Provider: Yuliet Valdez MD (Tel: [phone])                        Subjective:  .   Patient sitting in chair feeling much better abd soft  Feeling hungry no chest pain  Reviewed interval ancillary notes    Current Medications  insulin glargine (LANTUS;BASAGLAR) injection pen 18 Units, BID  calcium carbonate (TUMS) chewable tablet 500 mg, TID PRN  diphenhydrAMINE (BENADRYL) injection 12.5 mg, Q6H PRN  PN-Adult Premix 5/20 - Standard Electrolytes - Central Line, Continuous TPN  furosemide (LASIX) injection 40 mg, Daily  enoxaparin (LOVENOX) injection 90 mg, BID  insulin lispro (1 Unit Dial) 0-18 Units, Q4H  pantoprazole (PROTONIX) injection 40 mg, BID  digoxin (LANOXIN) injection 125 mcg, Daily  bisacodyl (DULCOLAX) suppository 10 mg, Daily PRN  phenol 1.4 % mouth spray 1 spray, Q2H PRN  sodium chloride flush 0.9 % injection 10 mL, 2 times per day  sodium chloride flush 0.9 % injection 10 mL, PRN  piperacillin-tazobactam (ZOSYN) 3,375 mg in dextrose 5 % 50 mL IVPB extended infusion (mini-bag), Q8H  sodium chloride flush 0.9 % injection 10 mL, 2 times per day  sodium chloride flush 0.9 % injection 10 mL, PRN  potassium chloride 20 mEq/50 mL IVPB (Central Line), PRN  [START ON 3/9/2021] fat emulsion 20 % infusion 250 mL, Once per day on Mon Thu  morphine (PF) injection 2 mg, Q4H PRN  promethazine (PHENERGAN) injection 12.5 mg, Q6H PRN  exenatide (BYETTA) injection 10 mcg -- PATIENT SUPPLIED, BID WC  sodium chloride flush 0.9 % injection 10 mL, 2 times per day  sodium chloride flush 0.9 % injection 10 mL, PRN  promethazine (PHENERGAN) tablet 12.5 mg, Q6H PRN    Or  ondansetron (ZOFRAN) injection 4 mg, Q6H PRN  polyethylene glycol (GLYCOLAX) packet 17 g, Daily PRN  acetaminophen (TYLENOL) tablet 650 mg, Q6H PRN    Or  acetaminophen (TYLENOL) suppository 650 mg, Q6H PRN  glucose (GLUTOSE) 40 % oral gel 15 g, PRN  dextrose 50 % IV solution, PRN  glucagon (rDNA) injection 1 mg, PRN  dextrose 5 % solution, PRN  albuterol sulfate  (90 Base) MCG/ACT inhaler 2 puff, Q6H PRN  gabapentin (NEURONTIN) capsule 300 mg, Nightly  montelukast (SINGULAIR) tablet 10 mg, Nightly  oxyCODONE (ROXICODONE) immediate release tablet 10 mg, Q3H PRN        Objective:  BP (!) 94/59   Pulse 90   Temp 97.6 °F (36.4 °C) (Oral)   Resp 16   Ht 5' 8\" (1.727 m)   Wt 201 lb 8 oz (91.4 kg)   SpO2 98%   BMI 30.64 kg/m²     Intake/Output Summary (Last 24 hours) at 3/7/2021 1126  Last data filed at 3/7/2021 0900  Gross per 24 hour   Intake 120 ml   Output --   Net 120 ml      Wt Readings from Last 3 Encounters:   03/04/21 201 lb 8 oz (91.4 kg)   02/26/21 213 lb (96.6 kg)   01/18/21 200 lb (90.7 kg)       General appearance:  Appears comfortable  Eyes: Sclera clear. Pupils equal.  ENT: Moist oral mucosa. Trachea midline  Cardiovascular: Irregularly irregular no murmurs appreciated  Respiratory: ctab no wheezing  GI: Soft NT  no guarding no rigidity bowel sounds positive    Neurology: no gross focal deficits  Ext: trace edema  Psych: Normal affect. Alert and oriented in time, place and person  Skin: Warm, dry, normal turgor    Labs and Tests:  CBC:   Recent Labs     03/04/21  2046 03/05/21  0656 03/05/21  0656 03/06/21  1135 03/06/21  1806 03/07/21  0015   WBC 5.8 5.2  --  6.7  --   --    HGB 11.2* 10.5*   < > 11.5* 11.3* 9.9*    141  --  172  --   --     < > = values in this interval not displayed. BMP:    Recent Labs     03/05/21  0657 03/06/21  1135    139   K 3.1* 3.9    103   CO2 32 27   BUN 33* 29*   CREATININE 1.0 0.9   GLUCOSE 183* 165*     Hepatic:   No results for input(s): AST, ALT, ALB, BILITOT, ALKPHOS in the last 72 hours.     Discussed care with family and patient             Spent 30  minutes with patient and family at bedside and on unit reviewing medical records and labs, spent greater than 50% time counseling patient and family on diagnosis and plan   Problem List  Active Problems:    Pneumoperitoneum    Pneumatosis intestinalis of small intestine    CHF (congestive heart failure), NYHA class I, acute on chronic, combined ([facility] [address]    Stage 3 chronic kidney disease    Coronary artery disease involving native heart without angina pectoris  Resolved Problems:    * No resolved hospital problems. *       Assessment & Plan:   58-year-old female who was admitted for weight gain of 13 pounds found to have acute on chronic CHF exacerbation. Patient has been diuresing with IV Lasix here. Found to have bump in creatinine with renal failure with creatinine of 1.5 cardiology and nephrology has been following.       Small bowel pneumatosis with loculated pneumoperitoneum  - npo  - iv atbx  -tpn  - surgery on board, repeat ct today    ?acute GI bleed  - seen by gi clear for ac , monitor hemoglobin continue protonix     Acute on chronic systolic CHF  - 77ON IV lasix      Hypokalemia  - improved    TORIBIO on CKD3:   - stable    Chronic A. Fib  - continue meds, cards on board  - coumadin on hold,on lovenox        Diet: Diet NPO Effective Now Exceptions are: Ice Chips, Popsicles  PN-Adult Premix 5/20 - Standard Electrolytes - Central Line  Code:Full Code    Connie Henderson MD   3/7/2021 11:26 AM

## 2021-03-07 NOTE — PROGRESS NOTES
[address]              Progress Note      Admit Date 2/26/2021  HPI: Zechariah Garcia is a 76 yo female with history of CAD/CABG in 2018, prosthetic mitral valve replacement, PAF with Maze 2018, HTN, HLD, DVT/PE on warfarin. came in today to the office for urgent visit for SOB and increased swelling. She was in a wheel chair with her . Her weight was up 13 pounds. Her weights at home increased from 198 to 212. She takes metolazone 2.5 mg twice a week and last one was 3 days ago. She takes torsemide and spironolactone at home. She has increased abdominal girth and swelling from ankles to mid thighs    Interval history:  Net loss 4.7 L      RA 96%     K+ 3.9 : supplemented in addition to 30mEq in TPN          NG d/c 3/6/21 (pneumatosis of small bowel with loculated pneumoperitoneum)     To remain on TPN     Ms. Srini Grant denies palpitations  Subjective: offers no c/o this am. Has had some flatus; +BM. Denies nausea. Denies chest discomfort.  Has only walked from bed to BR         Scheduled Meds:   furosemide  40 mg Intravenous Daily    insulin glargine  15 Units Subcutaneous BID    enoxaparin  1 mg/kg Subcutaneous BID    insulin lispro  0-18 Units Subcutaneous Q4H    pantoprazole  40 mg Intravenous BID    digoxin  125 mcg Intravenous Daily    sodium chloride flush  10 mL Intravenous 2 times per day    piperacillin-tazobactam  3,375 mg Intravenous Q8H    sodium chloride flush  10 mL Intravenous 2 times per day    [START ON 3/9/2021] fat emulsion  250 mL Intravenous Once per day on Mon Thu    exenatide  10 mcg Subcutaneous BID     sodium chloride flush  10 mL Intravenous 2 times per day    gabapentin  300 mg Oral Nightly    montelukast  10 mg Oral Nightly     Continuous Infusions:   PN-Adult Premix 5/20 - Standard Electrolytes - Central Line 80 mL/hr at 03/06/21 1917    dextrose 100 mL/hr (03/02/21 1519)     PRN Meds:calcium carbonate, diphenhydrAMINE, bisacodyl, phenol, sodium chloride flush, sodium chloride flush, potassium chloride, morphine, promethazine, sodium chloride flush, promethazine **OR** ondansetron, polyethylene glycol, acetaminophen **OR** acetaminophen, glucose, dextrose, glucagon (rDNA), dextrose, albuterol sulfate HFA, oxyCODONE       Objective: Wt Readings from Last 3 Encounters:   21 201 lb 8 oz (91.4 kg)   21 213 lb (96.6 kg)   21 200 lb (90.7 kg)   Admit weight: Weight: 211 lb (95.7 kg)      Temperature range over 24hrs:   Temp  Av.9 °F (36.6 °C)  Min: 97.6 °F (36.4 °C)  Max: 98.3 °F (36.8 °C)  Current Respiratory Rate:  Resp: 16  Current Pulse:  Pulse: 98  Current Blood Pressure:  BP: 106/61  24hr Blood Pressure Range:  Systolic (06LCT), DAH:366 , Min:103 , AOB:281   ; Diastolic (47ZHI), ZHM:62, Min:61, Max:80    Current Pulse Oximetry:  SpO2: 96 % RA    No intake or output data in the 24 hours ending 21 0717    Telemetry monitor:  Sinus rhythm    Physical Exam:  General:  Awake, alert, NAD. Very pleasant lady  Skin:  Warm and dry  Neck:  No JVD  Chest:  Clear to auscultation, respiration easy  Cardiovascular:  RRR 88 S1S2 no murmur to auscultation  Abdomen: Bowel sounds active, abd soft, non-tender  Extremities:  Trace billie LE and pedal edema Rt > Lt  : unremarkable      Imaging    Echo: Oct '20:  Summary   Mildly dilated left ventricular size and normal wall thickness. Global left   ventricular function is severely decreased with ejection fraction estimated   at 25%. Patient is in atrial fibrillation during procedure. The bioprosthetic mitral valve is well seated with a mean gradient of 5 mmHg   and maximum pressure gradient of 15 mmHg with heart rate of 89 bpm. Pressure   half time of 82 ms. There is trivial mitral regurgitation. Left atrial enlargement. Spontaneous echo contrast seen in the left atrium. A large stump of the left atrial appendage with no thrombus noted.  Patient   has history of surgical ligation of the left atrial appendage. There are   spontaneous echo contrast in the atrial appendage. The aortic valve is thickened/calcified with decreased leaflet mobility   consistent with aortic stenosis. There is trivial aortic insufficiency. There is moderate tricuspid regurgitation    Stress echo : Nov '20:  Summary   Dobutamine echocardiogram for aortic stenosis. Very technically limited exam due to atrial fibrillation. Baseline echocardiogram shows severe left ventricular dysfunction with   anterior, lateral and apical hypokinesis with an ejection fraction of 20-25   %. There is no improvement in wall motion with low or high dose dobutamine   suggestive of nonviable myocardium. Mild prosthetic aortic valve stenosis with a mean gradient of 16mmHg and   peak velocity of 2.47m/s at rest. After dobutamine stress the mean AV   gradient rises to 20mmHg with 20mcg of dobutamine consistent with mild to   moderate aortic stenosis.    Prosthetic mitral valve with a mean gradient of 7mmHg    Lab Review     Renal Profile:   Lab Results   Component Value Date    CREATININE 0.9 03/06/2021    BUN 29 03/06/2021     03/06/2021    K 3.9 03/06/2021    K 3.1 03/02/2021     03/06/2021    CO2 27 03/06/2021     CBC:    Lab Results   Component Value Date    WBC 6.7 03/06/2021    RBC 4.20 03/06/2021    HGB 9.9 03/07/2021    HCT 34.2 03/07/2021    MCV 86.5 03/06/2021    RDW 16.7 03/06/2021     03/06/2021     BNP:    Lab Results   Component Value Date     09/02/2020     Fasting Lipid Panel:    Lab Results   Component Value Date    CHOL 149 12/23/2020    HDL 40 12/23/2020    TRIG 94 03/03/2021     Cardiac Enzymes:    Lab Results   Component Value Date    TROPONINI <0.01 05/10/2020     PT/ INR   Lab Results   Component Value Date    INR 1.22 03/06/2021    INR 1.29 03/05/2021    INR 1.92 03/04/2021    PROTIME 14.2 03/06/2021    PROTIME 15.0 03/05/2021    PROTIME 22.4 03/04/2021     PTT No results found for: PTT   Lab Results   Component Value Date    MG 2.20 03/06/2021      Lab Results   Component Value Date    TSH 2.01 10/21/2020       Assessment/Plan:     Patient Active Problem List   Diagnosis    Long term current use of anticoagulant    Hypercholesteremia    Generalized osteoarthrosis, involving multiple sites    Eosinophilic gastritis    Paroxysmal SVT [facility]supraventricular tachycardia) ([facility])    B12 deficiency    History of pulmonary embolism    Multiple pulmonary nodules    Type 2 diabetes mellitus with hyperglycemia, with long-term current use of insulin ([facility])    Asthma    Pneumoperitoneum    PAF (paroxysmal atrial fibrillation) ([facility] [address]    Hypertension    Cardiac arrhythmia    Encounter for loop recorder check    Coronary artery disease due to lipid rich plaque    Nonrheumatic mitral valve regurgitation    S/P CABG x 3    Goiter    Primary osteoarthritis of both knees    Splenic infarct    Obesity, Class I, BMI 30-34.9    Chronic combined systolic (congestive) and diastolic (congestive) heart failure ([facility])    Thoracic degenerative disc disease    Persistent atrial fibrillation ([facility])    S/P MVR (mitral valve repair)    Ischemic cardiomyopathy    Occlusion and stenosis of bilateral carotid arteries    Pneumatosis intestinalis of small intestine    Nonrheumatic aortic valve stenosis    DVT (deep vein thrombosis) in pregnancy    CHF (congestive heart failure), NYHA class I, acute on chronic, combined ([facility])    Stage 3 chronic kidney disease    Coronary artery disease involving native heart without angina pectoris        1. HFrEF  Acute decompensation   Net loss 4.7 L; trace LE-pedal edema, lungs clear, RA 96% RA  ~at baseline wt    continue IV lasix 40 mg daily ; had taken 100 mg of torsemide daily PTA (confirmed with spouse); nephrology assisting with fluid management    ~potassium up to 3.9 after supplementation  ; creatinine / BUN improved   ~TPN with standard electrolytes   Digoxin 0.125 mg IV daily > transition to oral once starts po     2. Cardiomyopathy   ~anterior, lateral and apical HK, EF 20-25%    ~off aldactone and BB at this time d/t low BP. ~BP improved / stable  ~ 111/64 - 106/61    3. AF- sinus rhythm on telemetry   ~continue Dig, restart coreg at 6.25 when taking po   ~sinus on telemetry   ~lovenox     4.   Hypotension    ~BP improved       Plan: slowly reintroduce medications when taking po; plans for CT abd prior to reintroducing diet        Increase activity

## 2021-03-07 NOTE — PROGRESS NOTES
Nephrology Progress Note  [phone]  [phone]   [email or web address]    Patient:  Montez Erwin   : 1945    Brief HPI    76 yo woman with h/o Hypertension, DM II, CAD/CABG, s/p MVR prosthetic,  HPL, Afib on warfarin, h/o DVT/PE on warfarin, GERD presented from cardiology office with sob and weight gain. A Dobutamine echo in 2020 with LVEF of 20-25%  Admitted with decompensated CHF      Interval History (Chart/Data reviewed): NG tube removed yesterday and feeling better. Good UOP.  BUN/SCr stable. On RA. Discussed with patient and her  pending CT Scan results and resuming PO can switch IV to PO diuretics today vs. Tomorrow. Updated at bedside: Patient, her . Past medical, family, and social histories were reviewed as previously documented. Updates were made as necessary. Review of Systems ROS with pertinent positives and negatives listed in interval history.         Meds:  Scheduled Meds:   furosemide  40 mg Intravenous Daily    insulin glargine  15 Units Subcutaneous BID    enoxaparin  1 mg/kg Subcutaneous BID    insulin lispro  0-18 Units Subcutaneous Q4H    pantoprazole  40 mg Intravenous BID    digoxin  125 mcg Intravenous Daily    sodium chloride flush  10 mL Intravenous 2 times per day    piperacillin-tazobactam  3,375 mg Intravenous Q8H    sodium chloride flush  10 mL Intravenous 2 times per day    [START ON 3/9/2021] fat emulsion  250 mL Intravenous Once per day on Mon Thu    exenatide  10 mcg Subcutaneous BID WC    sodium chloride flush  10 mL Intravenous 2 times per day    gabapentin  300 mg Oral Nightly    montelukast  10 mg Oral Nightly     Continuous Infusions:   PN-Adult Premix  - Standard Electrolytes - Central Line 80 mL/hr at 21 1917    dextrose 100 mL/hr (21 1519)     PRN Meds:.calcium carbonate, diphenhydrAMINE, bisacodyl, phenol, sodium chloride flush, sodium chloride flush, potassium chloride, morphine, promethazine, sodium chloride flush, promethazine **OR** ondansetron, polyethylene glycol, acetaminophen **OR** acetaminophen, glucose, dextrose, glucagon (rDNA), dextrose, albuterol sulfate HFA, oxyCODONE      Vitals:  BP (!) 94/59   Pulse 90   Temp 97.6 °F (36.4 °C) (Oral)   Resp 16   Ht 5' 8\" (1.727 m)   Wt 201 lb 8 oz (91.4 kg)   SpO2 98%   BMI 30.64 kg/m²      Wt Readings from Last 10 Encounters:   03/04/21 201 lb 8 oz (91.4 kg)   02/26/21 213 lb (96.6 kg)   01/18/21 200 lb (90.7 kg)   01/11/21 207 lb 14.3 oz (94.3 kg)   01/04/21 208 lb (94.3 kg)   12/28/20 218 lb 7.6 oz (99.1 kg)   12/21/20 218 lb 6 oz (99.1 kg)   12/18/20 211 lb (95.7 kg)   12/15/20 210 lb (95.3 kg)   11/20/20 202 lb 9.6 oz (91.9 kg)   ]    General : AAOx3, not in pain or respiratory distress, resting in bed  HEENT : mucosa moist.   CVS: S1 S2 normal, regular rhythm, no murmurs or rubs. Lungs: Clear, no wheezing or crackles. Abd: Soft, distended, bowel sounds normal, non-tender. Ext: 1+ LE  edema, no cyanosis  Skin: Warm. No rashes appreciated.   : bladder non-distended, no tenderness over the bladder      Labs:  CBC with Differential:    Lab Results   Component Value Date    WBC 6.7 03/06/2021    RBC 4.20 03/06/2021    HGB 9.9 03/07/2021    HCT 34.2 03/07/2021     03/06/2021    MCV 86.5 03/06/2021    MCH 27.4 03/06/2021    MCHC 31.6 03/06/2021    RDW 16.7 03/06/2021    SEGSPCT 64.1 09/02/2020    BANDSPCT 1 01/14/2019    LYMPHOPCT 10.6 03/06/2021    MONOPCT 10.6 03/06/2021    BASOPCT 0.5 03/06/2021    MONOSABS 0.7 03/06/2021    LYMPHSABS 0.7 03/06/2021    EOSABS 0.8 03/06/2021    BASOSABS 0.0 03/06/2021     BMP:    Lab Results   Component Value Date     03/06/2021    K 3.9 03/06/2021    K 3.1 03/02/2021     03/06/2021    CO2 27 03/06/2021    BUN 29 03/06/2021    LABALBU 2.6 03/03/2021    CREATININE 0.9 03/06/2021    CALCIUM 9.0 03/06/2021    GFRAA >60 03/06/2021    LABGLOM >60 03/06/2021    LABGLOM 45 12/06/2018    GLUCOSE 165 03/06/2021     Ionized Calcium:  No results found for: IONCA  Magnesium:    Lab Results   Component Value Date    MG 2.20 03/06/2021     Phosphorus:    Lab Results   Component Value Date    PHOS 3.1 03/06/2021     Last 3 Troponin:    Lab Results   Component Value Date    TROPONINI <0.01 05/10/2020    TROPONINI <0.01 05/09/2020    TROPONINI <0.01 08/29/2019     U/A:    Lab Results   Component Value Date    COLORU YELLOW 02/28/2021    PROTEINU Negative 02/28/2021    PHUR 7.0 02/28/2021    WBCUA 31 05/11/2020    RBCUA neg 02/12/2021    RBCUA 1 05/11/2020    YEAST neg 02/12/2021    BACTERIA trace 02/12/2021    BACTERIA 1+ 05/11/2020    CLARITYU Clear 02/28/2021    SPECGRAV 1.010 02/28/2021    LEUKOCYTESUR Negative 02/28/2021    UROBILINOGEN 1.0 02/28/2021    BILIRUBINUR Negative 02/28/2021    BLOODU Negative 02/28/2021    GLUCOSEU Negative 02/28/2021       Assessment/Plan:    1. Acute Kidney injury  Possibly pre-renal/diuretic use in the setting of severe LV dysfunction  Base-line creatinine low 1s  Creatinine on admission was 1.5, peaked at 1.9 and is at base-line now  Diuretics resumed  Monitor in the setting of diuresis. 2. CHF   Continue lasix IV, continued 40 mg IV qdaily  3. Hypokalemia replete  Mag levels normal    4. Atrial fibrillation rate controlled  5. Small bowel pneumatosis with loculated pneumoperitoneum  On TPN  S/p NGT for bowel decompression; removed 3/6/21      Recommendations:  Stable and doing well on current IV Lasix 40 mg daily  Home regimen was Torsemide 100 mg daily/Spironolacton 50 mg daily/Metolazone 2.5 mg daily  Unsure adherence/absorbtion of this as extremely high dose and diuresing well on just Lasix 40 mg IVP daily currently which is equivalent to 40 mg Torsemide daily; Confirmed with her and her  she was taking Torsemide 100 mg daily / Spironolactone 50 mg daily but this was recently increased from Torsemide 20 mg daily/Spironoalctone 12.5 mg daily.      Suspect she was not absorbing the medications OK causing need for increase. Suspect she would do well on Torsemide 40 mg daily at home as diuretic dose. For now will leave on current regimen Lasix 40 mg IVP     Likely dry weight 200 lb / 90 kg     Admit Wt: Weight: 211 lb (95.7 kg)   Todays Wt: Weight: 201 lb 8 oz (91.4 kg)     Most Recent Wt: Weight: 201 lb 8 oz (91.4 kg)          Joann Soto MD.  3/7/2021  Office Phone : [phone]    Thank you for allowing us to participate in the care of this pt. I willcontinue to follow along. Please call with questions or concerns.

## 2021-03-07 NOTE — PROGRESS NOTES
[facility]        Progress Note    Patient Name: Brody Lindo  MRN: [id number]  YOB: 1945  Date of Evaluation: 3/7/2021    Chief Complaint: Abdominal pain    Subjective:  No acute events overnight  Denies abdominal pain at present  Denies nausea or vomiting since NGT removed, feels hungry  Passing some flatus and stool, not bloody or black--states is yellow; mild bloating  Up to chair at this time      Vital Signs:  Patient Vitals for the past 24 hrs:   BP Temp Temp src Pulse Resp SpO2   21 0944 -- -- -- 90 -- --   21 0900 (!) 94/59 97.6 °F (36.4 °C) Oral 97 16 98 %   21 0512 106/61 97.7 °F (36.5 °C) Oral 98 16 96 %   21 0011 111/64 97.9 °F (36.6 °C) Oral 93 16 96 %   21 1957 128/77 98.1 °F (36.7 °C) Oral 94 18 98 %   21 1755 -- -- -- 90 -- --   21 1652 103/67 98.3 °F (36.8 °C) Oral 95 18 99 %   21 1517 106/66 97.9 °F (36.6 °C) Oral 96 18 98 %   21 1253 -- -- -- 101 -- --      TEMPERATURE HISTORY 24H: Temp (24hrs), Av.9 °F (36.6 °C), Min:97.6 °F (36.4 °C), Max:98.3 °F (36.8 °C)    BLOOD PRESSURE HISTORY: Systolic (76QRA), ARM:327 , Min:94 , CZK:305    Diastolic (77SYG), RCX:49, Min:34, Max:80      Intake/Output:  No intake/output data recorded. I/O this shift:  In: 120 [P.O.:120]  Out: -   Drain/tube Output:       Physical Exam:  General: awake, alert, oriented to  person, place, time  Cardiovascular:  regular rate and rhythm and no murmur noted  Lungs: clear to auscultation  Abdomen: soft, nontender, mildly distended, active bowel sounds normal     Labs:  CBC:    Recent Labs     21  2046 21  0656 21  0656 21  1135 21  1806 21  0015   WBC 5.8 5.2  --  6.7  --   --    HGB 11.2* 10.5*   < > 11.5* 11.3* 9.9*   HCT 35.0* 32.6*   < > 36.4 35.8* 34.2*    141  --  172  --   --     < > = values in this interval not displayed.      BMP:    Recent Labs 03/05/21  0657 03/06/21  1135    139   K 3.1* 3.9    103   CO2 32 27   BUN 33* 29*   CREATININE 1.0 0.9   GLUCOSE 183* 165*     Hepatic:    No results for input(s): AST, ALT, ALB, BILITOT, ALKPHOS in the last 72 hours. Amylase:    Lab Results   Component Value Date    AMYLASE 38 12/07/2015     Lipase:    Lab Results   Component Value Date    LIPASE 35.0 05/10/2020    LIPASE 72.0 04/24/2019    LIPASE 78.0 01/14/2019      Mag:    Lab Results   Component Value Date    MG 2.20 03/06/2021    MG 2.30 03/05/2021     Phos:     Lab Results   Component Value Date    PHOS 3.1 03/06/2021    PHOS 2.9 03/05/2021      Coags:   Lab Results   Component Value Date    PROTIME 14.2 03/06/2021    INR 1.22 03/06/2021    APTT 33.7 03/05/2021       Cultures:  Anaerobic culture  No results found for: LABANAE  Fungus stain  No results found for requested labs within last 30 days. Gram stain  No results found for requested labs within last 30 days. Organism  Lab Results   Component Value Date/Time    ORG Staphylococcus coagulase-negative (A) 10/01/2018 08:55 PM    ORG Staphylococcus coagulase negative DNA Detected (A) 10/01/2018 08:55 PM    ORG Staphylococcus coagulase-negative (A) 10/01/2018 08:55 PM     Surgical culture  No results found for: CXSURG  Blood culture 1  No results found for requested labs within last 30 days. Blood culture 2  No results found for requested labs within last 30 days. Fecal occult  No results found for requested labs within last 30 days. GI bacterial pathogens by PCR  No results found for requested labs within last 30 days. C. difficile  No results found for requested labs within last 30 days. Urine culture  Lab Results   Component Value Date    LABURIN  02/12/2021     <50,000 CFU/ml mixed skin/urogenital art. No further workup       Pathology:  No relevant pathology     Imaging:  I have personally reviewed the following films:    No results found.     Scheduled Meds:   team and consulting services    EDUCATION:  Educated patient on plan of care and disease process--all questions answered. Plans discussed with patient and nursing. Reviewed and discussed with Dr. Ori Miller. Signed:  SOLEDAD Jones CNP  3/7/2021 10:51 AM     Pleasant 59-year-old female seen in follow-up for small bowel pneumatosis and pneumoperitoneum. She is doing very well. Her abdominal examination remains benign. No nausea or vomiting with NG tube out yesterday. No fevers or tachycardia. Will repeat CAT scan of the abdomen and pelvis today. If the CAT scan looks okay, will start a diet today.

## 2021-03-07 NOTE — FLOWSHEET NOTE
03/07/21 0900   Vital Signs   Temp 97.6 °F (36.4 °C)   Temp Source Oral   Pulse 97   Heart Rate Source Brachial   Resp 16   BP (!) 94/59   BP Location Left lower arm   MAP (mmHg) 70   Patient Position Semi fowlers   Level of Consciousness Alert (0)   MEWS Score 2   Patient Currently in Pain Yes   Pain Assessment   Pain Assessment 0-10   Pain Level 4   Pain Type Chronic pain   Non-Pharmaceutical Pain Intervention(s) Declines   Response to Pain Intervention Patient Satisfied   Patient's Stated Pain Goal No pain   Oxygen Therapy   SpO2 98 %   O2 Device None (Room air)     Shift assessment compete. VSS. Pt. Is alert and oriented resting comfortably in bed. Pt. Has complaints of pain but denies need for medication at this time. Pt states no SOB or chest pain at this time. POC discussed with patient and patient states understanding and is in agreement with plan. Bed alarm engaged. Call light and bedside table within reach.  Angie Thompson Patient's blood pressure elevated  in past 24 hours. Goal for blood pressure is SBP < 150 and DBP < 90 as patient > or = 60 years of age with no diabetes or advanced kidney disease based on JNC 8 guidelines. Continue current treatment regimen of Coreg 12.5 mg po BID that was started in MICU and will consider increasing dose tomorrow if BP remains above goal. Plan to continue to monitor patient's blood pressure routinely while patient is hospitalized.

## 2021-03-08 PROBLEM — I38 ACUTE ON CHRONIC SYSTOLIC HEART FAILURE DUE TO VALVULAR DISEASE (HCC): Status: ACTIVE | Noted: 2021-02-26

## 2021-03-08 PROBLEM — I50.23 ACUTE ON CHRONIC SYSTOLIC HEART FAILURE DUE TO VALVULAR DISEASE (HCC): Status: ACTIVE | Noted: 2021-02-26

## 2021-03-08 LAB
ANION GAP SERPL CALCULATED.3IONS-SCNC: 6 MMOL/L (ref 3–16)
BASOPHILS ABSOLUTE: 0 K/UL (ref 0–0.2)
BASOPHILS RELATIVE PERCENT: 0.4 %
BUN BLDV-MCNC: 24 MG/DL (ref 7–20)
CALCIUM SERPL-MCNC: 8.5 MG/DL (ref 8.3–10.6)
CHLORIDE BLD-SCNC: 104 MMOL/L (ref 99–110)
CO2: 31 MMOL/L (ref 21–32)
CREAT SERPL-MCNC: 1 MG/DL (ref 0.6–1.2)
EOSINOPHILS ABSOLUTE: 0.8 K/UL (ref 0–0.6)
EOSINOPHILS RELATIVE PERCENT: 17.4 %
GFR AFRICAN AMERICAN: >60
GFR NON-AFRICAN AMERICAN: 54
GLUCOSE BLD-MCNC: 136 MG/DL (ref 70–99)
GLUCOSE BLD-MCNC: 168 MG/DL (ref 70–99)
GLUCOSE BLD-MCNC: 176 MG/DL (ref 70–99)
GLUCOSE BLD-MCNC: 177 MG/DL (ref 70–99)
GLUCOSE BLD-MCNC: 194 MG/DL (ref 70–99)
GLUCOSE BLD-MCNC: 195 MG/DL (ref 70–99)
GLUCOSE BLD-MCNC: 231 MG/DL (ref 70–99)
HCT VFR BLD CALC: 32.2 % (ref 36–48)
HEMOGLOBIN: 10.4 G/DL (ref 12–16)
INR BLD: 1.21 (ref 0.86–1.14)
LYMPHOCYTES ABSOLUTE: 0.7 K/UL (ref 1–5.1)
LYMPHOCYTES RELATIVE PERCENT: 14.4 %
MAGNESIUM: 2.2 MG/DL (ref 1.8–2.4)
MCH RBC QN AUTO: 27.8 PG (ref 26–34)
MCHC RBC AUTO-ENTMCNC: 32.2 G/DL (ref 31–36)
MCV RBC AUTO: 86.2 FL (ref 80–100)
MONOCYTES ABSOLUTE: 0.5 K/UL (ref 0–1.3)
MONOCYTES RELATIVE PERCENT: 11.3 %
NEUTROPHILS ABSOLUTE: 2.7 K/UL (ref 1.7–7.7)
NEUTROPHILS RELATIVE PERCENT: 56.5 %
PDW BLD-RTO: 17.2 % (ref 12.4–15.4)
PERFORMED ON: ABNORMAL
PHOSPHORUS: 2.8 MG/DL (ref 2.5–4.9)
PLATELET # BLD: 162 K/UL (ref 135–450)
PMV BLD AUTO: 8.9 FL (ref 5–10.5)
POTASSIUM SERPL-SCNC: 3.4 MMOL/L (ref 3.5–5.1)
PROTHROMBIN TIME: 14.1 SEC (ref 10–13.2)
RBC # BLD: 3.73 M/UL (ref 4–5.2)
SODIUM BLD-SCNC: 141 MMOL/L (ref 136–145)
WBC # BLD: 4.8 K/UL (ref 4–11)

## 2021-03-08 PROCEDURE — 99232 SBSQ HOSP IP/OBS MODERATE 35: CPT | Performed by: CLINICAL NURSE SPECIALIST

## 2021-03-08 PROCEDURE — 6360000002 HC RX W HCPCS: Performed by: NURSE PRACTITIONER

## 2021-03-08 PROCEDURE — 6370000000 HC RX 637 (ALT 250 FOR IP): Performed by: INTERNAL MEDICINE

## 2021-03-08 PROCEDURE — 80048 BASIC METABOLIC PNL TOTAL CA: CPT

## 2021-03-08 PROCEDURE — 6360000002 HC RX W HCPCS: Performed by: INTERNAL MEDICINE

## 2021-03-08 PROCEDURE — 1200000000 HC SEMI PRIVATE

## 2021-03-08 PROCEDURE — 85610 PROTHROMBIN TIME: CPT

## 2021-03-08 PROCEDURE — 84100 ASSAY OF PHOSPHORUS: CPT

## 2021-03-08 PROCEDURE — 83735 ASSAY OF MAGNESIUM: CPT

## 2021-03-08 PROCEDURE — C9113 INJ PANTOPRAZOLE SODIUM, VIA: HCPCS | Performed by: NURSE PRACTITIONER

## 2021-03-08 PROCEDURE — 2580000003 HC RX 258: Performed by: INTERNAL MEDICINE

## 2021-03-08 PROCEDURE — APPNB30 APP NON BILLABLE TIME 0-30 MINS: Performed by: NURSE PRACTITIONER

## 2021-03-08 PROCEDURE — 99232 SBSQ HOSP IP/OBS MODERATE 35: CPT | Performed by: SURGERY

## 2021-03-08 PROCEDURE — 6360000002 HC RX W HCPCS: Performed by: SURGERY

## 2021-03-08 PROCEDURE — APPSS15 APP SPLIT SHARED TIME 0-15 MINUTES: Performed by: NURSE PRACTITIONER

## 2021-03-08 PROCEDURE — 2500000003 HC RX 250 WO HCPCS: Performed by: SURGERY

## 2021-03-08 PROCEDURE — 2580000003 HC RX 258: Performed by: NURSE PRACTITIONER

## 2021-03-08 PROCEDURE — 94760 N-INVAS EAR/PLS OXIMETRY 1: CPT

## 2021-03-08 PROCEDURE — 85025 COMPLETE CBC W/AUTO DIFF WBC: CPT

## 2021-03-08 RX ORDER — POTASSIUM CHLORIDE 29.8 MG/ML
20 INJECTION INTRAVENOUS
Status: DISPENSED | OUTPATIENT
Start: 2021-03-08 | End: 2021-03-08

## 2021-03-08 RX ADMIN — INSULIN LISPRO 6 UNITS: 100 INJECTION, SOLUTION INTRAVENOUS; SUBCUTANEOUS at 12:04

## 2021-03-08 RX ADMIN — DIGOXIN 125 MCG: 0.25 INJECTION INTRAMUSCULAR; INTRAVENOUS at 09:16

## 2021-03-08 RX ADMIN — FUROSEMIDE 40 MG: 10 INJECTION, SOLUTION INTRAMUSCULAR; INTRAVENOUS at 09:16

## 2021-03-08 RX ADMIN — POTASSIUM CHLORIDE 20 MEQ: 400 INJECTION, SOLUTION INTRAVENOUS at 18:49

## 2021-03-08 RX ADMIN — OXYCODONE 10 MG: 5 TABLET ORAL at 12:13

## 2021-03-08 RX ADMIN — ENOXAPARIN SODIUM 90 MG: 100 INJECTION SUBCUTANEOUS at 09:15

## 2021-03-08 RX ADMIN — INSULIN LISPRO 3 UNITS: 100 INJECTION, SOLUTION INTRAVENOUS; SUBCUTANEOUS at 09:15

## 2021-03-08 RX ADMIN — INSULIN LISPRO 3 UNITS: 100 INJECTION, SOLUTION INTRAVENOUS; SUBCUTANEOUS at 00:39

## 2021-03-08 RX ADMIN — ENOXAPARIN SODIUM 90 MG: 100 INJECTION SUBCUTANEOUS at 20:49

## 2021-03-08 RX ADMIN — POTASSIUM CHLORIDE 20 MEQ: 400 INJECTION, SOLUTION INTRAVENOUS at 16:48

## 2021-03-08 RX ADMIN — GABAPENTIN 300 MG: 300 CAPSULE ORAL at 20:50

## 2021-03-08 RX ADMIN — MONTELUKAST SODIUM 10 MG: 10 TABLET, FILM COATED ORAL at 20:50

## 2021-03-08 RX ADMIN — ASCORBIC ACID, VITAMIN A PALMITATE, CHOLECALCIFEROL, THIAMINE HYDROCHLORIDE, RIBOFLAVIN-5 PHOSPHATE SODIUM, PYRIDOXINE HYDROCHLORIDE, NIACINAMIDE, DEXPANTHENOL, ALPHA-TOCOPHEROL ACETATE, VITAMIN K1, FOLIC ACID, BIOTIN, CYANOCOBALAMIN: 200; 3300; 200; 6; 3.6; 6; 40; 15; 10; 150; 600; 60; 5 INJECTION, SOLUTION INTRAVENOUS at 20:53

## 2021-03-08 RX ADMIN — PIPERACILLIN AND TAZOBACTAM 3375 MG: 3; .375 INJECTION, POWDER, LYOPHILIZED, FOR SOLUTION INTRAVENOUS at 12:03

## 2021-03-08 RX ADMIN — PANTOPRAZOLE SODIUM 40 MG: 40 INJECTION, POWDER, FOR SOLUTION INTRAVENOUS at 09:16

## 2021-03-08 RX ADMIN — INSULIN GLARGINE 18 UNITS: 100 INJECTION, SOLUTION SUBCUTANEOUS at 09:50

## 2021-03-08 RX ADMIN — Medication 10 ML: at 09:16

## 2021-03-08 RX ADMIN — PIPERACILLIN AND TAZOBACTAM 3375 MG: 3; .375 INJECTION, POWDER, LYOPHILIZED, FOR SOLUTION INTRAVENOUS at 20:49

## 2021-03-08 RX ADMIN — INSULIN GLARGINE 18 UNITS: 100 INJECTION, SOLUTION SUBCUTANEOUS at 20:55

## 2021-03-08 RX ADMIN — INSULIN LISPRO 3 UNITS: 100 INJECTION, SOLUTION INTRAVENOUS; SUBCUTANEOUS at 20:54

## 2021-03-08 RX ADMIN — PANTOPRAZOLE SODIUM 40 MG: 40 INJECTION, POWDER, FOR SOLUTION INTRAVENOUS at 20:50

## 2021-03-08 RX ADMIN — Medication 10 ML: at 20:50

## 2021-03-08 RX ADMIN — Medication 10 ML: at 20:51

## 2021-03-08 RX ADMIN — MORPHINE SULFATE 2 MG: 2 INJECTION, SOLUTION INTRAMUSCULAR; INTRAVENOUS at 21:14

## 2021-03-08 RX ADMIN — INSULIN LISPRO 3 UNITS: 100 INJECTION, SOLUTION INTRAVENOUS; SUBCUTANEOUS at 04:37

## 2021-03-08 RX ADMIN — PIPERACILLIN AND TAZOBACTAM 3375 MG: 3; .375 INJECTION, POWDER, LYOPHILIZED, FOR SOLUTION INTRAVENOUS at 04:36

## 2021-03-08 ASSESSMENT — PAIN SCALES - GENERAL
PAINLEVEL_OUTOF10: 7
PAINLEVEL_OUTOF10: 5
PAINLEVEL_OUTOF10: 8
PAINLEVEL_OUTOF10: 0
PAINLEVEL_OUTOF10: 0

## 2021-03-08 ASSESSMENT — PAIN DESCRIPTION - LOCATION: LOCATION: BACK

## 2021-03-08 ASSESSMENT — PAIN DESCRIPTION - PAIN TYPE: TYPE: CHRONIC PAIN

## 2021-03-08 ASSESSMENT — PAIN DESCRIPTION - DIRECTION: RADIATING_TOWARDS: LOWER BACK

## 2021-03-08 NOTE — PROGRESS NOTES
30.6  · BMI Categories: Obese Class 1 (BMI 30.0-34. 9)       Nutrition Diagnosis:   · Inadequate oral intake related to altered GI function as evidenced by nutrition support - parenteral nutrition      Nutrition Interventions:   Food and/or Nutrient Delivery:  Continue Current Parenteral Nutrition, Continue Current Diet, Start Oral Nutrition Supplement  Nutrition Education/Counseling:  Education completed   Coordination of Nutrition Care:  Continue to monitor while inpatient    Goals:  tolerance to PO diet as advanced past clears       Nutrition Monitoring and Evaluation:   Behavioral-Environmental Outcomes:  None Identified   Food/Nutrient Intake Outcomes:  Diet Advancement/Tolerance, Food and Nutrient Intake, Supplement Intake, Parenteral Nutrition Intake/Tolerance  Physical Signs/Symptoms Outcomes:  GI Status, Weight     Discharge Planning:     Too soon to determine     Electronically signed by Donell Jacobs RD, LD on 3/8/21 at 5:46 PM EST  Contact: [phone]

## 2021-03-08 NOTE — PROGRESS NOTES
[facility]        Progress Note    Patient Name: Liz Miller  MRN: [id number]  YOB: 1945  Date of Evaluation: 3/8/2021    Chief Complaint: Abdominal pain    Subjective:  No acute events overnight  Denies abdominal pain at present  Denies nausea or vomiting  Passing flatus and stool, darker colored; mild bloating  Resting in bed at this time; moves around well      Vital Signs:  Patient Vitals for the past 24 hrs:   BP Temp Temp src Pulse Resp SpO2 Weight   21 0515 -- -- -- -- -- -- 201 lb (91.2 kg)   21 0433 114/62 98.1 °F (36.7 °C) Oral 96 15 96 % --   21 0037 123/70 98.1 °F (36.7 °C) Oral 94 16 96 % --   21 2035 116/65 98 °F (36.7 °C) Oral 98 16 96 % --   21 1830 -- -- -- 98 -- -- --   21 1700 115/79 97 °F (36.1 °C) Oral 111 16 97 % --   21 1400 -- -- -- 90 -- -- --   21 1130 115/73 97 °F (36.1 °C) Oral 95 16 96 % --   21 0944 -- -- -- 90 -- -- --   21 0900 (!) 94/59 97.6 °F (36.4 °C) Oral 97 16 98 % --      TEMPERATURE HISTORY 24H: Temp (24hrs), Av.6 °F (36.4 °C), Min:97 °F (36.1 °C), Max:98.1 °F (36.7 °C)    BLOOD PRESSURE HISTORY: Systolic (03HBP), VGW:810 , Min:94 , JCI:961    Diastolic (50BRC), ECA:15, Min:59, Max:79      Intake/Output:  I/O last 3 completed shifts: In: 120 [P.O.:120]  Out: -   No intake/output data recorded. Drain/tube Output:       Physical Exam:  General: awake, alert, oriented to  person, place, time  Cardiovascular:  regular rate and rhythm and no murmur noted  Lungs: clear to auscultation  Abdomen: soft, nontender, mildly distended, active bowel sounds normal     Labs:  CBC:    Recent Labs     21  1135 21  1135 21  0015 21  1410 21  0620   WBC 6.7  --   --  6.1 4.8   HGB 11.5*   < > 9.9* 10.8* 10.4*   HCT 36.4   < > 34.2* 33.8* 32.2*     --   --  160 162    < > = values in this interval not displayed.      BMP:    Recent Labs films:    Ct Abdomen Pelvis W Iv Contrast Additional Contrast? Oral    Result Date: 3/7/2021  EXAMINATION: CT OF THE ABDOMEN AND PELVIS WITH CONTRAST 3/7/2021 1:56 pm TECHNIQUE: CT of the abdomen and pelvis was performed with the administration of intravenous contrast. Multiplanar reformatted images are provided for review. Dose modulation, iterative reconstruction, and/or weight based adjustment of the mA/kV was utilized to reduce the radiation dose to as low as reasonably achievable. COMPARISON: 03/04/2021 and prior. HISTORY: ORDERING SYSTEM PROVIDED HISTORY: Re-eval pneumotosis and penumoperitoneum TECHNOLOGIST PROVIDED HISTORY: Reason for exam:->Re-eval pneumotosis and penumoperitoneum Additional Contrast?->Oral Acuity: Acute Type of Exam: Initial FINDINGS: Lower Chest: Scattered tree-in-bud opacities and more focal bandlike opacity at the right lung base appears similar to the prior study. Organs: The patient is status post cholecystectomy. Mild dilation of the biliary collecting system again noted likely related to post cholecystectomy ectasia. The liver is otherwise grossly unremarkable in appearance. The spleen, adrenal glands, pancreas and kidneys are grossly unremarkable in appearance. GI/Bowel: The stomach demonstrates mild edematous wall thickening distally. No focal inflammatory change noted. There is extensive pneumatosis again noted. The findings are similar when accounting for differences in technique to the comparison. There is some redistribution from the comparison. There is increased air tracking along the sigmoid colon compared to the previous exam. There is oral contrast extending all the way to the rectum. The appendix is visualized and appears normal. No extravasation of oral contrast or significant fluid collections identified. Pelvis: Large left adnexal cyst measuring 8.5 x 6.4 cm again noted. The urinary bladder, uterus and right ovary are grossly unremarkable. Peritoneum/Retroperitoneum: The aorta is normal in caliber. No suspicious retroperitoneal adenopathy is noted. Bones/Soft Tissues: No acute osseous abnormality noted. Extensive pneumatosis or pneumoperitoneum is again noted. The findings are similar to the comparison with redistribution compared to the prior study. No obvious evidence of perforation noted. Mild wall thickening of the distal stomach suggesting possible peptic ulcer disease. Large cystic lesion within the left adnexa again noted, stable. MRI of the pelvis is recommended if this is not surgically evaluated.      Scheduled Meds:   insulin glargine  18 Units Subcutaneous BID    furosemide  40 mg Intravenous Daily    enoxaparin  1 mg/kg Subcutaneous BID    insulin lispro  0-18 Units Subcutaneous Q4H    pantoprazole  40 mg Intravenous BID    digoxin  125 mcg Intravenous Daily    sodium chloride flush  10 mL Intravenous 2 times per day    piperacillin-tazobactam  3,375 mg Intravenous Q8H    sodium chloride flush  10 mL Intravenous 2 times per day    [START ON 3/9/2021] fat emulsion  250 mL Intravenous Once per day on Mon Thu    exenatide  10 mcg Subcutaneous BID WC    sodium chloride flush  10 mL Intravenous 2 times per day    gabapentin  300 mg Oral Nightly    montelukast  10 mg Oral Nightly     Continuous Infusions:   PN-Adult Premix 5/20 - Standard Electrolytes - Central Line 80 mL/hr at 03/07/21 2041    dextrose 100 mL/hr (03/02/21 1519)     PRN Meds:.calcium carbonate, diphenhydrAMINE, bisacodyl, phenol, sodium chloride flush, sodium chloride flush, potassium chloride, morphine, promethazine, sodium chloride flush, promethazine **OR** ondansetron, polyethylene glycol, acetaminophen **OR** acetaminophen, glucose, dextrose, glucagon (rDNA), dextrose, albuterol sulfate HFA, oxyCODONE      Assessment:  Small bowel pneumatosis with loculated pneumoperitoneum  Acute on chronic CHF  CAD, status-post CABG  Acute on chronic kidney disease  Atrial fibrillation, on Coumadin  History of DVT/PE        Plan:  1. Abdominal pain minimal--none at present, only mild bloating; repeat CT yesterday stable; no plans for surgical intervention at this time  2. Trial clear liquid diet today; monitor for bowel function--passing stool  3. Continue TPN; monitor and correct electrolytes  4. Antibiotics  5. Activity as tolerated, ambulate TID, up to chair for meals  6. PRN analgesics and antiemetics--minimizing narcotics as tolerated  7. DVT prophylaxis with SCD's; okay for short acting anticoagulant--Lovenox/heparin  8. Management of medical comorbid etiologies per primary team and consulting services    EDUCATION:  Educated patient on plan of care and disease process--all questions answered. Plans discussed with patient and nursing. Reviewed and discussed with Dr. Emely Al. Signed:  SOLEDAD Velasco - CNP  3/8/2021 8:44 AM     I have personally performed a face to face diagnostic evaluation on this patient. I have interviewed and examined the patient and I agree with the assessment above. In summary, my findings and plan are the following:   Ms. Tresa Rivera is a 77 y.o. female who presents with   Small bowel pneumatosis with loculated pneumoperitoneum  CHF  CAD  Atrial fibrillation  History of DVT/PE  Medical coagulopathy, on coumadin     Plan:  1. Pneumatosis of small bowel with loculated pneumoperitoneum is stable, vital signs are stable, lactic acid normal, without peritoneal signs or toxicity. Does not need emergent/urgent surgical exploration unless condition deteriorates. 2. NG removed, advance to clear liquids with caution, continue TPN  3. Pain medication and antiemetics as needed with caution as may mask worsening exam  4. Antibiotics  5. No signs of bleeding, may anticoagulate with heparin gtt or lovenox from surgical standpoint if anticoagulation needed  6.  Defer management of remainder of medical comorbidities to primary and consulting teams    Jesse Abraham MD, FACS  3/8/2021  10:35 AM

## 2021-03-08 NOTE — PROGRESS NOTES
Clinical Pharmacy Note    Pharmacy consulted by Dr. Jd Kong to manage TPN    Current TPN rate: 80ml/hr  Goal TPN rate: 80ml/hr    Labs:  General Labs:  BMP:    Lab Results   Component Value Date     03/08/2021    K 3.4 03/08/2021    K 3.1 03/02/2021     03/08/2021    CO2 31 03/08/2021    BUN 24 03/08/2021    LABALBU 2.6 03/03/2021    CREATININE 1.0 03/08/2021    CALCIUM 8.5 03/08/2021    GFRAA >60 03/08/2021    LABGLOM 54 03/08/2021    LABGLOM 45 12/06/2018    GLUCOSE 176 03/08/2021     Blood sugars: 176-195    Electrolyte replacement as follows:   Replace potassium with 40 mEq of potassium chloride administered IV over 2 hours    Blood sugar management:  Plan to continue q4 hour Humalog to high dose sliding scale. Plan to continue TPN at 80ml/hr. Thank you for allowing pharmacy to participate in the care of this patient.     Shanda Coyle, PharmD 3/8/2021

## 2021-03-08 NOTE — PROGRESS NOTES
[facility]    3/8/2021 11:11 AM        Name: Jolynn Santos . Admitted: 2/26/2021  Primary Care Provider: Gavin Gutierrez MD (Tel: [phone])                        Subjective:  .   In bed feels good no abdominal pain is feeling hungry no shortness of breath or fevers  Reviewed interval ancillary notes    Current Medications  insulin glargine (LANTUS;BASAGLAR) injection pen 18 Units, BID  PN-Adult Premix 5/20 - Standard Electrolytes - Central Line, Continuous TPN  calcium carbonate (TUMS) chewable tablet 500 mg, TID PRN  diphenhydrAMINE (BENADRYL) injection 12.5 mg, Q6H PRN  furosemide (LASIX) injection 40 mg, Daily  enoxaparin (LOVENOX) injection 90 mg, BID  insulin lispro (1 Unit Dial) 0-18 Units, Q4H  pantoprazole (PROTONIX) injection 40 mg, BID  digoxin (LANOXIN) injection 125 mcg, Daily  bisacodyl (DULCOLAX) suppository 10 mg, Daily PRN  phenol 1.4 % mouth spray 1 spray, Q2H PRN  sodium chloride flush 0.9 % injection 10 mL, 2 times per day  sodium chloride flush 0.9 % injection 10 mL, PRN  piperacillin-tazobactam (ZOSYN) 3,375 mg in dextrose 5 % 50 mL IVPB extended infusion (mini-bag), Q8H  sodium chloride flush 0.9 % injection 10 mL, 2 times per day  sodium chloride flush 0.9 % injection 10 mL, PRN  potassium chloride 20 mEq/50 mL IVPB (Central Line), PRN  [START ON 3/9/2021] fat emulsion 20 % infusion 250 mL, Once per day on Mon Thu  morphine (PF) injection 2 mg, Q4H PRN  promethazine (PHENERGAN) injection 12.5 mg, Q6H PRN  exenatide (BYETTA) injection 10 mcg -- PATIENT SUPPLIED, BID WC  sodium chloride flush 0.9 % injection 10 mL, 2 times per day  sodium chloride flush 0.9 % injection 10 mL, PRN  promethazine (PHENERGAN) tablet 12.5 mg, Q6H PRN    Or  ondansetron (ZOFRAN) injection 4 mg, Q6H PRN  polyethylene glycol (GLYCOLAX) packet 17 g, Daily PRN  acetaminophen (TYLENOL) tablet 650 mg, Q6H PRN    Or  acetaminophen (TYLENOL) suppository 650 mg, Q6H PRN  glucose (GLUTOSE) 40 % oral gel 15 g, PRN  dextrose 50 % IV solution, PRN  glucagon (rDNA) injection 1 mg, PRN  dextrose 5 % solution, PRN  albuterol sulfate  (90 Base) MCG/ACT inhaler 2 puff, Q6H PRN  gabapentin (NEURONTIN) capsule 300 mg, Nightly  montelukast (SINGULAIR) tablet 10 mg, Nightly  oxyCODONE (ROXICODONE) immediate release tablet 10 mg, Q3H PRN        Objective:  BP (!) 90/58   Pulse 96   Temp 97.8 °F (36.6 °C) (Oral)   Resp 16   Ht 5' 8\" (1.727 m)   Wt 201 lb (91.2 kg)   SpO2 96%   BMI 30.56 kg/m²   No intake or output data in the 24 hours ending 03/08/21 1111   Wt Readings from Last 3 Encounters:   03/08/21 201 lb (91.2 kg)   02/26/21 213 lb (96.6 kg)   01/18/21 200 lb (90.7 kg)       General appearance:  Appears comfortable  Eyes: Sclera clear. Pupils equal.  ENT: Moist oral mucosa. Trachea midline  Cardiovascular: Irregularly irregular no murmurs appreciated  Respiratory: ctab no wheezing  GI: Soft NT  no guarding no rigidity bowel sounds positive    Neurology: no gross focal deficits  Ext: trace edema  Psych: Normal affect. Alert and oriented in time, place and person  Skin: Warm, dry, normal turgor    Labs and Tests:  CBC:   Recent Labs     03/06/21  1135 03/06/21  1135 03/07/21  0015 03/07/21  1410 03/08/21  0620   WBC 6.7  --   --  6.1 4.8   HGB 11.5*   < > 9.9* 10.8* 10.4*     --   --  160 162    < > = values in this interval not displayed. BMP:    Recent Labs     03/06/21  1135 03/07/21  1410 03/08/21  0620    136 141   K 3.9 3.0* 3.4*    100 104   CO2 27 30 31   BUN 29* 25* 24*   CREATININE 0.9 1.0 1.0   GLUCOSE 165* 147* 176*     Hepatic:   No results for input(s): AST, ALT, ALB, BILITOT, ALKPHOS in the last 72 hours.     Discussed care with family and patient             Spent 30  minutes with patient and family at bedside and on unit reviewing medical records and labs, spent greater than 50% time counseling patient and family on diagnosis and plan   Problem List  Active Problems:    Pneumoperitoneum    Pneumatosis intestinalis of small intestine    Aortic valve stenosis    Acute on chronic systolic heart failure due to valvular disease (HCC)    Stage 3 chronic kidney disease    Coronary artery disease involving native heart without angina pectoris    Hypotension  Resolved Problems:    * No resolved hospital problems. *       Assessment & Plan:   78-year-old female who was admitted for weight gain of 13 pounds found to have acute on chronic CHF exacerbation. Patient has been diuresing with IV Lasix here. Found to have bump in creatinine with renal failure with creatinine of 1.5 cardiology and nephrology has been following.       Small bowel pneumatosis with loculated pneumoperitoneum:  - start on clears per surgery and monitor  - iv atbx  -tpn      ?acute GI bleed  - seen by gi clear for ac , monitor hemoglobin continue protonix     Acute on chronic systolic CHF  - 26GS IV lasix      Hypokalemia  - improved    TORIBIO on CKD3:   - stable    Chronic A. Fib  - iv dig for now, cards on board  - coumadin on hold,on lovenox        Diet: PN-Adult Premix 5/20 - Standard Electrolytes - Central Line  DIET CLEAR LIQUID;  Code:Full Code    Osman Arguello MD   3/8/2021 11:11 AM

## 2021-03-08 NOTE — PLAN OF CARE
Nutrition Problem #1: Inadequate oral intake  Intervention: Food and/or Nutrient Delivery: Continue Current Parenteral Nutrition, Continue Current Diet, Start Oral Nutrition Supplement  Nutritional Goals: tolerance to PO diet as advanced past clears

## 2021-03-08 NOTE — PROGRESS NOTES
[facility]   Daily Progress Note      Admit Date:  2/26/2021    HPI:    Ms. Ochoa Dural 77 y.o. female with history of CAD/CABG in 2018, PAF with maze 2018, HTN, HLD, DVT/PE on coumadin, 2 CVs unsuccessful, sHF, admissions for pneumoperitoneum x3 in the past    She is admitted for increased SOB and swelling. Weight up 13 pounds and increased diuretics along with a prednisone taper. She had increased abdominal girth. Pneumatosis of small bowel with loculated pneumoperitoneum requiring an NG tube    Subjective:  Patient is being seen for acute on chronicsHF. There were no acute overnight cardiac events. Weight stable at 201 creat 1.0 potassium 3.4 CT abdomen yesterday still with pneumatosis. She has been started on clear liquids, stool liquid this morning. She continues on TPN. Her edema is much improved but abdomen is still distended    Objective:   BP (!) 90/58   Pulse 96   Temp 97.8 °F (36.6 °C) (Oral)   Resp 16   Ht 5' 8\" (1.727 m)   Wt 201 lb (91.2 kg)   SpO2 96%   BMI 30.56 kg/m²   No intake or output data in the 24 hours ending 03/08/21 0951       Physical Exam:  General:  Awake, alert, oriented in NAD  Skin:  Warm and dry. No unusual bruising or rash  Neck:  Supple. No JVD or carotid bruit appreciated  Chest:  Normal effort.   Clear to auscultation, no wheezes/rhonchi/rales  Cardiovascular:  RRR, S1/S2, no murmur/gallop/rub  Abdomen:  Distended, firm  Extremities:  Bilateral lower ankle edema  Neurological: No focal deficits  Psychological: Normal mood and affect      Medications:    insulin glargine  18 Units Subcutaneous BID    furosemide  40 mg Intravenous Daily    enoxaparin  1 mg/kg Subcutaneous BID    insulin lispro  0-18 Units Subcutaneous Q4H    pantoprazole  40 mg Intravenous BID    digoxin  125 mcg Intravenous Daily    sodium chloride flush  10 mL Intravenous 2 times per day    piperacillin-tazobactam  3,375 mg Intravenous Q8H    sodium chloride flush  10 mL Intravenous 2 times per day    [START ON 3/9/2021] fat emulsion  250 mL Intravenous Once per day on Mon Thu    exenatide  10 mcg Subcutaneous BID WC    sodium chloride flush  10 mL Intravenous 2 times per day    gabapentin  300 mg Oral Nightly    montelukast  10 mg Oral Nightly      PN-Adult Premix 5/20 - Standard Electrolytes - Central Line 80 mL/hr at 03/07/21 2041    dextrose 100 mL/hr (03/02/21 1519)       Lab Data:  CBC:   Recent Labs     03/06/21  1135 03/06/21  1135 03/07/21  0015 03/07/21  1410 03/08/21  0620   WBC 6.7  --   --  6.1 4.8   HGB 11.5*   < > 9.9* 10.8* 10.4*     --   --  160 162    < > = values in this interval not displayed. BMP:    Recent Labs     03/06/21  1135 03/07/21  1410 03/08/21  0620    136 141   K 3.9 3.0* 3.4*   CO2 27 30 31   BUN 29* 25* 24*   CREATININE 0.9 1.0 1.0     INR:    Recent Labs     03/06/21  1135 03/08/21  0620   INR 1.22* 1.21*     BNP:  No results for input(s): PROBNP in the last 72 hours. Diagnostics:  11/30/2020 Dobutamine Echo   Dobutamine echocardiogram for aortic stenosis.   Very technically limited exam due to atrial fibrillation.   Baseline echocardiogram shows severe left ventricular dysfunction with   anterior, lateral and apical hypokinesis with an ejection fraction of 20-25%.   There is no improvement in wall motion with low or high dose dobutamine   suggestive of nonviable myocardium.      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 After dobutamine stress the mean AV   gradient rises to 20mmHg with 20mcg of dobutamine consistent with mild to   moderate aortic stenosis.  Prosthetic mitral valve with a mean gradient of 7mmHg        JUDE 10/21/2020  Mildly dilated left ventricular size and normal wall thickness. Global left ventricular function is severely decreased with ejection fraction estimated at 25%.  Patient is in atrial fibrillation during procedure.      The bioprosthetic mitral valve is well seated with a mean gradient of 5mmHg and maximum pressure gradient of 15 mmHg with heart rate of 89 bpm. Pressure half time of 82 ms. There is trivial mitral regurgitation.      Left atrial enlargement. Spontaneous echo contrast seen in the left atrium. A large stump of the left atrial appendage with no thrombus noted. Patient has history of surgical ligation of the left atrial appendage. There are spontaneous echo contrast in the atrial appendage.      The aortic valve is thickened/calcified with decreased leaflet mobility consistent with aortic stenosis. There is trivial aortic insufficiency.      There is moderate tricuspid regurgitation. Assessment:    1. Acute on chronic systolic heart failure, no ace/arb due to hypotension  2. Chronic kidney disease  3. CAD s/p CABG  4. Aortic stenosis  5. PAF, on lovenox  6. Hypotension  7.  pneumatosis of small intestine    Plan:    1. Nephrology following  2. TPN per hospital team  3. Continue digoxin 125 mcg IV daily  4. Continue to hold diuretics for now (would consider holding metolazone at discharge and resuming 40 mg bid of torsemide (had been on torsemide 100 daily)  And aldosterone antagonist 50 mg daily    Discussed with patient who is agreeable with plan of care. Thank you for allowing me to participate in the care of your patient.     Shoshana Vee, CNS

## 2021-03-08 NOTE — PROGRESS NOTES
(1.727 m)   Wt 201 lb (91.2 kg)   SpO2 96%   BMI 30.56 kg/m²      Wt Readings from Last 10 Encounters:   03/08/21 201 lb (91.2 kg)   02/26/21 213 lb (96.6 kg)   01/18/21 200 lb (90.7 kg)   01/11/21 207 lb 14.3 oz (94.3 kg)   01/04/21 208 lb (94.3 kg)   12/28/20 218 lb 7.6 oz (99.1 kg)   12/21/20 218 lb 6 oz (99.1 kg)   12/18/20 211 lb (95.7 kg)   12/15/20 210 lb (95.3 kg)   11/20/20 202 lb 9.6 oz (91.9 kg)   ]    General : AAOx3, not in pain or respiratory distress, resting in bed  HEENT : mucosa moist.   CVS: S1 S2 normal, regular rhythm, no murmurs or rubs. Lungs: Clear, no wheezing or crackles. Abd: Soft, distended, bowel sounds normal, non-tender. Ext: trace LE  edema, no cyanosis  Skin: Warm. No rashes appreciated.   : bladder non-distended, no tenderness over the bladder      Labs:  CBC with Differential:    Lab Results   Component Value Date    WBC 4.8 03/08/2021    RBC 3.73 03/08/2021    HGB 10.4 03/08/2021    HCT 32.2 03/08/2021     03/08/2021    MCV 86.2 03/08/2021    MCH 27.8 03/08/2021    MCHC 32.2 03/08/2021    RDW 17.2 03/08/2021    SEGSPCT 64.1 09/02/2020    BANDSPCT 1 01/14/2019    LYMPHOPCT 14.4 03/08/2021    MONOPCT 11.3 03/08/2021    BASOPCT 0.4 03/08/2021    MONOSABS 0.5 03/08/2021    LYMPHSABS 0.7 03/08/2021    EOSABS 0.8 03/08/2021    BASOSABS 0.0 03/08/2021     BMP:    Lab Results   Component Value Date     03/08/2021    K 3.4 03/08/2021    K 3.1 03/02/2021     03/08/2021    CO2 31 03/08/2021    BUN 24 03/08/2021    LABALBU 2.6 03/03/2021    CREATININE 1.0 03/08/2021    CALCIUM 8.5 03/08/2021    GFRAA >60 03/08/2021    LABGLOM 54 03/08/2021    LABGLOM 45 12/06/2018    GLUCOSE 176 03/08/2021     Ionized Calcium:  No results found for: IONCA  Magnesium:    Lab Results   Component Value Date    MG 2.20 03/08/2021     Phosphorus:    Lab Results   Component Value Date    PHOS 2.8 03/08/2021     Last 3 Troponin:    Lab Results   Component Value Date    TROPONINI <0.01 05/10/2020    TROPONINI <0.01 05/09/2020    TROPONINI <0.01 08/29/2019     U/A:    Lab Results   Component Value Date    COLORU YELLOW 02/28/2021    PROTEINU Negative 02/28/2021    PHUR 7.0 02/28/2021    WBCUA 31 05/11/2020    RBCUA neg 02/12/2021    RBCUA 1 05/11/2020    YEAST neg 02/12/2021    BACTERIA trace 02/12/2021    BACTERIA 1+ 05/11/2020    CLARITYU Clear 02/28/2021    SPECGRAV 1.010 02/28/2021    LEUKOCYTESUR Negative 02/28/2021    UROBILINOGEN 1.0 02/28/2021    BILIRUBINUR Negative 02/28/2021    BLOODU Negative 02/28/2021    GLUCOSEU Negative 02/28/2021       Assessment/Plan:    Acute Kidney injury  Possibly pre-renal/diuretic use in the setting of severe LV dysfunction  Base-line creatinine low 1s  Creatinine on admission was 1.5, peaked at 1.9 and is at base-line now  Continue lasix IV till able to transition to PO diet  -plan will be to discharge on torsemide 40mg bid and monitor Scr and volume status closely as outpatient    CHF   Continue lasix IV, continued 40 mg IV for now and transition to torsemide once PO diet is resumed    Hypokalemia replace  Mag levels normal    Atrial fibrillation rate controlled    Small bowel pneumatosis with loculated pneumoperitoneum  On TPN  S/p NGT for bowel decompression; removed 3/6/21  -will be starting clears    Anemia: monitor regarding need for transfusion          Jameson Ron MD.  3/8/2021  Office Phone : [phone]    Thank you for allowing us to participate in the care of this pt. I willcontinue to follow along. Please call with questions or concerns.

## 2021-03-09 LAB
ANION GAP SERPL CALCULATED.3IONS-SCNC: 6 MMOL/L (ref 3–16)
BASOPHILS ABSOLUTE: 0 K/UL (ref 0–0.2)
BASOPHILS RELATIVE PERCENT: 0.5 %
BUN BLDV-MCNC: 21 MG/DL (ref 7–20)
CALCIUM SERPL-MCNC: 8.4 MG/DL (ref 8.3–10.6)
CHLORIDE BLD-SCNC: 104 MMOL/L (ref 99–110)
CO2: 29 MMOL/L (ref 21–32)
CREAT SERPL-MCNC: 0.9 MG/DL (ref 0.6–1.2)
EOSINOPHILS ABSOLUTE: 0.8 K/UL (ref 0–0.6)
EOSINOPHILS RELATIVE PERCENT: 17 %
GFR AFRICAN AMERICAN: >60
GFR NON-AFRICAN AMERICAN: >60
GLUCOSE BLD-MCNC: 138 MG/DL (ref 70–99)
GLUCOSE BLD-MCNC: 152 MG/DL (ref 70–99)
GLUCOSE BLD-MCNC: 159 MG/DL (ref 70–99)
GLUCOSE BLD-MCNC: 187 MG/DL (ref 70–99)
GLUCOSE BLD-MCNC: 223 MG/DL (ref 70–99)
GLUCOSE BLD-MCNC: 227 MG/DL (ref 70–99)
GLUCOSE BLD-MCNC: 242 MG/DL (ref 70–99)
GLUCOSE BLD-MCNC: 290 MG/DL (ref 70–99)
HCT VFR BLD CALC: 31.5 % (ref 36–48)
HEMOGLOBIN: 10.2 G/DL (ref 12–16)
INR BLD: 1.22 (ref 0.86–1.14)
LYMPHOCYTES ABSOLUTE: 0.7 K/UL (ref 1–5.1)
LYMPHOCYTES RELATIVE PERCENT: 14.3 %
MAGNESIUM: 2.2 MG/DL (ref 1.8–2.4)
MCH RBC QN AUTO: 28.3 PG (ref 26–34)
MCHC RBC AUTO-ENTMCNC: 32.6 G/DL (ref 31–36)
MCV RBC AUTO: 87 FL (ref 80–100)
MONOCYTES ABSOLUTE: 0.6 K/UL (ref 0–1.3)
MONOCYTES RELATIVE PERCENT: 12.7 %
NEUTROPHILS ABSOLUTE: 2.7 K/UL (ref 1.7–7.7)
NEUTROPHILS RELATIVE PERCENT: 55.5 %
PDW BLD-RTO: 17.1 % (ref 12.4–15.4)
PERFORMED ON: ABNORMAL
PHOSPHORUS: 2.6 MG/DL (ref 2.5–4.9)
PLATELET # BLD: 153 K/UL (ref 135–450)
PMV BLD AUTO: 8.3 FL (ref 5–10.5)
POTASSIUM SERPL-SCNC: 3.4 MMOL/L (ref 3.5–5.1)
POTASSIUM SERPL-SCNC: 3.5 MMOL/L (ref 3.5–5.1)
PROTHROMBIN TIME: 14.2 SEC (ref 10–13.2)
RBC # BLD: 3.61 M/UL (ref 4–5.2)
SODIUM BLD-SCNC: 139 MMOL/L (ref 136–145)
WBC # BLD: 4.9 K/UL (ref 4–11)

## 2021-03-09 PROCEDURE — C9113 INJ PANTOPRAZOLE SODIUM, VIA: HCPCS | Performed by: NURSE PRACTITIONER

## 2021-03-09 PROCEDURE — 6360000002 HC RX W HCPCS: Performed by: INTERNAL MEDICINE

## 2021-03-09 PROCEDURE — 6360000002 HC RX W HCPCS: Performed by: NURSE PRACTITIONER

## 2021-03-09 PROCEDURE — APPSS15 APP SPLIT SHARED TIME 0-15 MINUTES: Performed by: NURSE PRACTITIONER

## 2021-03-09 PROCEDURE — 80048 BASIC METABOLIC PNL TOTAL CA: CPT

## 2021-03-09 PROCEDURE — 2500000003 HC RX 250 WO HCPCS: Performed by: SURGERY

## 2021-03-09 PROCEDURE — 2580000003 HC RX 258: Performed by: NURSE PRACTITIONER

## 2021-03-09 PROCEDURE — 85610 PROTHROMBIN TIME: CPT

## 2021-03-09 PROCEDURE — 6370000000 HC RX 637 (ALT 250 FOR IP): Performed by: FAMILY MEDICINE

## 2021-03-09 PROCEDURE — 99232 SBSQ HOSP IP/OBS MODERATE 35: CPT | Performed by: SURGERY

## 2021-03-09 PROCEDURE — 2580000003 HC RX 258: Performed by: INTERNAL MEDICINE

## 2021-03-09 PROCEDURE — 2580000003 HC RX 258: Performed by: SURGERY

## 2021-03-09 PROCEDURE — 84132 ASSAY OF SERUM POTASSIUM: CPT

## 2021-03-09 PROCEDURE — 85025 COMPLETE CBC W/AUTO DIFF WBC: CPT

## 2021-03-09 PROCEDURE — APPNB30 APP NON BILLABLE TIME 0-30 MINS: Performed by: NURSE PRACTITIONER

## 2021-03-09 PROCEDURE — 83735 ASSAY OF MAGNESIUM: CPT

## 2021-03-09 PROCEDURE — 99232 SBSQ HOSP IP/OBS MODERATE 35: CPT | Performed by: CLINICAL NURSE SPECIALIST

## 2021-03-09 PROCEDURE — 6360000002 HC RX W HCPCS: Performed by: SURGERY

## 2021-03-09 PROCEDURE — 94760 N-INVAS EAR/PLS OXIMETRY 1: CPT

## 2021-03-09 PROCEDURE — 84100 ASSAY OF PHOSPHORUS: CPT

## 2021-03-09 PROCEDURE — 6370000000 HC RX 637 (ALT 250 FOR IP): Performed by: INTERNAL MEDICINE

## 2021-03-09 PROCEDURE — 1200000000 HC SEMI PRIVATE

## 2021-03-09 RX ADMIN — Medication 10 ML: at 08:28

## 2021-03-09 RX ADMIN — INSULIN LISPRO 3 UNITS: 100 INJECTION, SOLUTION INTRAVENOUS; SUBCUTANEOUS at 04:19

## 2021-03-09 RX ADMIN — Medication 10 ML: at 21:00

## 2021-03-09 RX ADMIN — INSULIN LISPRO 6 UNITS: 100 INJECTION, SOLUTION INTRAVENOUS; SUBCUTANEOUS at 21:02

## 2021-03-09 RX ADMIN — PIPERACILLIN AND TAZOBACTAM 3375 MG: 3; .375 INJECTION, POWDER, LYOPHILIZED, FOR SOLUTION INTRAVENOUS at 12:30

## 2021-03-09 RX ADMIN — FUROSEMIDE 40 MG: 10 INJECTION, SOLUTION INTRAMUSCULAR; INTRAVENOUS at 08:28

## 2021-03-09 RX ADMIN — MORPHINE SULFATE 2 MG: 2 INJECTION, SOLUTION INTRAMUSCULAR; INTRAVENOUS at 12:50

## 2021-03-09 RX ADMIN — PANTOPRAZOLE SODIUM 40 MG: 40 INJECTION, POWDER, FOR SOLUTION INTRAVENOUS at 08:28

## 2021-03-09 RX ADMIN — DIGOXIN 125 MCG: 0.25 INJECTION INTRAMUSCULAR; INTRAVENOUS at 08:28

## 2021-03-09 RX ADMIN — INSULIN LISPRO 3 UNITS: 100 INJECTION, SOLUTION INTRAVENOUS; SUBCUTANEOUS at 08:27

## 2021-03-09 RX ADMIN — POTASSIUM CHLORIDE 20 MEQ: 400 INJECTION, SOLUTION INTRAVENOUS at 05:56

## 2021-03-09 RX ADMIN — MONTELUKAST SODIUM 10 MG: 10 TABLET, FILM COATED ORAL at 21:00

## 2021-03-09 RX ADMIN — GABAPENTIN 300 MG: 300 CAPSULE ORAL at 21:00

## 2021-03-09 RX ADMIN — POTASSIUM PHOSPHATE, MONOBASIC AND POTASSIUM PHOSPHATE, DIBASIC 15 MMOL: 224; 236 INJECTION, SOLUTION, CONCENTRATE INTRAVENOUS at 08:48

## 2021-03-09 RX ADMIN — INSULIN LISPRO 6 UNITS: 100 INJECTION, SOLUTION INTRAVENOUS; SUBCUTANEOUS at 23:57

## 2021-03-09 RX ADMIN — POTASSIUM CHLORIDE 20 MEQ: 400 INJECTION, SOLUTION INTRAVENOUS at 05:21

## 2021-03-09 RX ADMIN — INSULIN GLARGINE 18 UNITS: 100 INJECTION, SOLUTION SUBCUTANEOUS at 21:01

## 2021-03-09 RX ADMIN — ENOXAPARIN SODIUM 90 MG: 100 INJECTION SUBCUTANEOUS at 20:59

## 2021-03-09 RX ADMIN — OXYCODONE 10 MG: 5 TABLET ORAL at 15:15

## 2021-03-09 RX ADMIN — LEUCINE, PHENYLALANINE, LYSINE, METHIONINE, ISOLEUCINE, VALINE, HISTIDINE, THREONINE, TRYPTOPHAN, ALANINE, GLYCINE, ARGININE, PROLINE, SERINE, TYROSINE, SODIUM ACETATE, DIBASIC POTASSIUM PHOSPHATE, MAGNESIUM CHLORIDE, SODIUM CHLORIDE, CALCIUM CHLORIDE, DEXTROSE
365; 280; 290; 200; 300; 290; 240; 210; 90; 1035; 515; 575; 340; 250; 20; 340; 261; 51; 59; 33; 20 INJECTION INTRAVENOUS at 18:46

## 2021-03-09 RX ADMIN — INSULIN LISPRO 6 UNITS: 100 INJECTION, SOLUTION INTRAVENOUS; SUBCUTANEOUS at 00:17

## 2021-03-09 RX ADMIN — INSULIN GLARGINE 18 UNITS: 100 INJECTION, SOLUTION SUBCUTANEOUS at 08:28

## 2021-03-09 RX ADMIN — INSULIN LISPRO 9 UNITS: 100 INJECTION, SOLUTION INTRAVENOUS; SUBCUTANEOUS at 12:31

## 2021-03-09 RX ADMIN — ENOXAPARIN SODIUM 90 MG: 100 INJECTION SUBCUTANEOUS at 08:27

## 2021-03-09 RX ADMIN — PANTOPRAZOLE SODIUM 40 MG: 40 INJECTION, POWDER, FOR SOLUTION INTRAVENOUS at 21:00

## 2021-03-09 RX ADMIN — PIPERACILLIN AND TAZOBACTAM 3375 MG: 3; .375 INJECTION, POWDER, LYOPHILIZED, FOR SOLUTION INTRAVENOUS at 04:10

## 2021-03-09 ASSESSMENT — PAIN DESCRIPTION - DESCRIPTORS: DESCRIPTORS: SHARP

## 2021-03-09 ASSESSMENT — PAIN - FUNCTIONAL ASSESSMENT: PAIN_FUNCTIONAL_ASSESSMENT: ACTIVITIES ARE NOT PREVENTED

## 2021-03-09 ASSESSMENT — PAIN DESCRIPTION - LOCATION: LOCATION: BACK

## 2021-03-09 ASSESSMENT — PAIN SCALES - GENERAL
PAINLEVEL_OUTOF10: 0
PAINLEVEL_OUTOF10: 6
PAINLEVEL_OUTOF10: 6
PAINLEVEL_OUTOF10: 8
PAINLEVEL_OUTOF10: 8

## 2021-03-09 ASSESSMENT — PAIN DESCRIPTION - FREQUENCY
FREQUENCY: CONTINUOUS
FREQUENCY: CONTINUOUS

## 2021-03-09 ASSESSMENT — PAIN DESCRIPTION - PROGRESSION: CLINICAL_PROGRESSION: NOT CHANGED

## 2021-03-09 ASSESSMENT — PAIN DESCRIPTION - PAIN TYPE: TYPE: CHRONIC PAIN

## 2021-03-09 ASSESSMENT — PAIN DESCRIPTION - ONSET: ONSET: ON-GOING

## 2021-03-09 ASSESSMENT — PAIN DESCRIPTION - ORIENTATION: ORIENTATION: UPPER

## 2021-03-09 NOTE — PROGRESS NOTES
Clinical Pharmacy Note    Pharmacy consulted by Dr. Cara Guan to manage TPN    Current TPN rate: 80ml/hr  Goal TPN rate: 80ml/hr    Labs:  General Labs:  BMP:    Lab Results   Component Value Date     03/09/2021    K 3.5 03/09/2021    K 3.1 03/02/2021     03/09/2021    CO2 29 03/09/2021    BUN 21 03/09/2021    LABALBU 2.6 03/03/2021    CREATININE 0.9 03/09/2021    CALCIUM 8.4 03/09/2021    GFRAA >60 03/09/2021    LABGLOM >60 03/09/2021    LABGLOM 45 12/06/2018    GLUCOSE 159 03/09/2021     Blood sugars: 152-290    Electrolyte replacement as follows:   Replace potassium with 40 mEq of potassium chloride administered IV over 2 hours  Replace phosphorous with 15 mmol of potassium phosphate administered IV over 4 hours    Blood sugar management:  Plan to continue q4 hour Humalog to medium dose sliding scale. Plan to continue TPN to 80 ml/hr. Thank you for allowing pharmacy to participate in the care of this patient.     Jose Alfredo Villagran, PharmD 3/9/2021

## 2021-03-09 NOTE — PROGRESS NOTES
[facility]        Progress Note    Patient Name: Rosette Dan  MRN: [id number]  YOB: 1945  Date of Evaluation: 3/9/2021    Chief Complaint: Abdominal pain    Subjective:  No acute events overnight  Denies abdominal pain  Denies nausea or vomiting, tolerated clear liquid diet, wanting more solid foods  Passing flatus and stool, no hematochezia/melena  Up to chair at this time; moves around well      Vital Signs:  Patient Vitals for the past 24 hrs:   BP Temp Temp src Pulse Resp SpO2 Height Weight   21 1158 125/63 98 °F (36.7 °C) Oral 94 16 98 % -- --   21 0815 134/74 97.6 °F (36.4 °C) Oral 93 18 96 % 5' 8\" (1.727 m) 201 lb (91.2 kg)   21 0405 113/63 97.5 °F (36.4 °C) Oral 92 20 94 % -- --   21 0012 106/62 98.3 °F (36.8 °C) Oral 97 16 94 % -- --   21 2041 111/65 97.8 °F (36.6 °C) Oral 98 16 97 % -- --   21 1734 -- -- -- -- -- -- 5' 8\" (1.727 m) --   21 1645 116/73 97.7 °F (36.5 °C) Oral 95 16 97 % -- --      TEMPERATURE HISTORY 24H: Temp (24hrs), Av.8 °F (36.6 °C), Min:97.5 °F (36.4 °C), Max:98.3 °F (36.8 °C)    BLOOD PRESSURE HISTORY: Systolic (68FLY), EHZ:643 , Min:90 , WBM:861    Diastolic (47CPY), JFX:73, Min:58, Max:74      Intake/Output:  I/O last 3 completed shifts:   In: 78022.7 [P.O.:600; IV Piggyback:200]  Out: -   I/O this shift:  In: 120 [P.O.:120]  Out: -   Drain/tube Output:       Physical Exam:  General: awake, alert, oriented to  person, place, time  Cardiovascular:  regular rate and rhythm and no murmur noted  Lungs: clear to auscultation  Abdomen: soft, nontender, mildly distended, active bowel sounds normal     Labs:  CBC:    Recent Labs     21  1410 21  0620 21  0415   WBC 6.1 4.8 4.9   HGB 10.8* 10.4* 10.2*   HCT 33.8* 32.2* 31.5*    162 153     BMP:    Recent Labs     21  1410 21  0620 21  0415 21  0850    141 139  --    K 3.0* 3.4* 3.4* 3.5    104 104  --    CO2 30 31 29  --    BUN 25* 24* 21*  --    CREATININE 1.0 1.0 0.9  --    GLUCOSE 147* 176* 159*  --      Hepatic:    No results for input(s): AST, ALT, ALB, BILITOT, ALKPHOS in the last 72 hours. Amylase:    Lab Results   Component Value Date    AMYLASE 38 12/07/2015     Lipase:    Lab Results   Component Value Date    LIPASE 35.0 05/10/2020    LIPASE 72.0 04/24/2019    LIPASE 78.0 01/14/2019      Mag:    Lab Results   Component Value Date    MG 2.20 03/09/2021    MG 2.20 03/08/2021     Phos:     Lab Results   Component Value Date    PHOS 2.6 03/09/2021    PHOS 2.8 03/08/2021      Coags:   Lab Results   Component Value Date    PROTIME 14.2 03/09/2021    INR 1.22 03/09/2021    APTT 33.7 03/05/2021       Cultures:  Anaerobic culture  No results found for: LABANAE  Fungus stain  No results found for requested labs within last 30 days. Gram stain  No results found for requested labs within last 30 days. Organism  Lab Results   Component Value Date/Time    ORG Staphylococcus coagulase-negative (A) 10/01/2018 08:55 PM    ORG Staphylococcus coagulase negative DNA Detected (A) 10/01/2018 08:55 PM    ORG Staphylococcus coagulase-negative (A) 10/01/2018 08:55 PM     Surgical culture  No results found for: CXSURG  Blood culture 1  No results found for requested labs within last 30 days. Blood culture 2  No results found for requested labs within last 30 days. Fecal occult  No results found for requested labs within last 30 days. GI bacterial pathogens by PCR  No results found for requested labs within last 30 days. C. difficile  No results found for requested labs within last 30 days. Urine culture  Lab Results   Component Value Date    LABURIN  02/12/2021     <50,000 CFU/ml mixed skin/urogenital art.  No further workup       Pathology:  No relevant pathology     Imaging:  I have personally reviewed the following films:    Ct Abdomen Pelvis W Iv Contrast Additional Contrast? Oral    Result Date: 3/7/2021  EXAMINATION: CT OF THE ABDOMEN AND PELVIS WITH CONTRAST 3/7/2021 1:56 pm TECHNIQUE: CT of the abdomen and pelvis was performed with the administration of intravenous contrast. Multiplanar reformatted images are provided for review. Dose modulation, iterative reconstruction, and/or weight based adjustment of the mA/kV was utilized to reduce the radiation dose to as low as reasonably achievable. COMPARISON: 03/04/2021 and prior. HISTORY: ORDERING SYSTEM PROVIDED HISTORY: Re-eval pneumotosis and penumoperitoneum TECHNOLOGIST PROVIDED HISTORY: Reason for exam:->Re-eval pneumotosis and penumoperitoneum Additional Contrast?->Oral Acuity: Acute Type of Exam: Initial FINDINGS: Lower Chest: Scattered tree-in-bud opacities and more focal bandlike opacity at the right lung base appears similar to the prior study. Organs: The patient is status post cholecystectomy. Mild dilation of the biliary collecting system again noted likely related to post cholecystectomy ectasia. The liver is otherwise grossly unremarkable in appearance. The spleen, adrenal glands, pancreas and kidneys are grossly unremarkable in appearance. GI/Bowel: The stomach demonstrates mild edematous wall thickening distally. No focal inflammatory change noted. There is extensive pneumatosis again noted. The findings are similar when accounting for differences in technique to the comparison. There is some redistribution from the comparison. There is increased air tracking along the sigmoid colon compared to the previous exam. There is oral contrast extending all the way to the rectum. The appendix is visualized and appears normal. No extravasation of oral contrast or significant fluid collections identified. Pelvis: Large left adnexal cyst measuring 8.5 x 6.4 cm again noted. The urinary bladder, uterus and right ovary are grossly unremarkable. Peritoneum/Retroperitoneum: The aorta is normal in caliber.   No suspicious retroperitoneal adenopathy is noted. Bones/Soft Tissues: No acute osseous abnormality noted. Extensive pneumatosis or pneumoperitoneum is again noted. The findings are similar to the comparison with redistribution compared to the prior study. No obvious evidence of perforation noted. Mild wall thickening of the distal stomach suggesting possible peptic ulcer disease. Large cystic lesion within the left adnexa again noted, stable. MRI of the pelvis is recommended if this is not surgically evaluated.      Scheduled Meds:   potassium phosphate IVPB  15 mmol Intravenous Once    insulin glargine  18 Units Subcutaneous BID    furosemide  40 mg Intravenous Daily    enoxaparin  1 mg/kg Subcutaneous BID    insulin lispro  0-18 Units Subcutaneous Q4H    pantoprazole  40 mg Intravenous BID    digoxin  125 mcg Intravenous Daily    sodium chloride flush  10 mL Intravenous 2 times per day    piperacillin-tazobactam  3,375 mg Intravenous Q8H    sodium chloride flush  10 mL Intravenous 2 times per day    fat emulsion  250 mL Intravenous Once per day on Mon Thu    exenatide  10 mcg Subcutaneous BID WC    sodium chloride flush  10 mL Intravenous 2 times per day    gabapentin  300 mg Oral Nightly    montelukast  10 mg Oral Nightly     Continuous Infusions:   PN-Adult Premix 5/20 - Standard Electrolytes - Central Line 80 mL/hr at 03/08/21 2053    dextrose 100 mL/hr (03/02/21 1519)     PRN Meds:.calcium carbonate, diphenhydrAMINE, bisacodyl, phenol, sodium chloride flush, sodium chloride flush, potassium chloride, morphine, promethazine, sodium chloride flush, promethazine **OR** ondansetron, polyethylene glycol, acetaminophen **OR** acetaminophen, glucose, dextrose, glucagon (rDNA), dextrose, albuterol sulfate HFA, oxyCODONE      Assessment:  Small bowel pneumatosis with loculated pneumoperitoneum  Acute on chronic CHF  CAD, status-post CABG  Acute on chronic kidney disease  Atrial fibrillation, on Coumadin  History of DVT/PE        Plan:  1. Exam remains benign, only mild bloating; last CT stable; no plans for surgical intervention at this time  2. Advance to low fiber diet as tolerated; monitor for bowel function--passing stool  3. Continue TPN--if tolerates diet today, will stop tomorrow; monitor and correct electrolytes  4. Antibiotics  5. Activity as tolerated, ambulate TID, up to chair for meals  6. PRN analgesics and antiemetics--minimizing narcotics as tolerated  7. DVT prophylaxis with SCD's; okay for short acting anticoagulant--Lovenox/heparin  8. Management of medical comorbid etiologies per primary team and consulting services  9. Disposition: Discharge planning; anticipate will be ready from a surgical perspective in the next 24-48 hours    EDUCATION:  Educated patient on plan of care and disease process--all questions answered. Plans discussed with patient and nursing. Reviewed and discussed with Dr. Oscar Flores. Signed:  SOLEDAD Shearer CNP  3/9/2021 12:30 PM     I have personally performed a face to face diagnostic evaluation on this patient. I have interviewed and examined the patient and I agree with the assessment above. In summary, my findings and plan are the following:   Ms. Cortez Singleton is a 77 y.o. female who presents with   Small bowel pneumatosis with loculated pneumoperitoneum  CHF  CAD  Atrial fibrillation  History of DVT/PE  Medical coagulopathy, on coumadin     Plan:  1. Pneumatosis of small bowel with loculated pneumoperitoneum is stable, vital signs are stable, lactic acid normal, without peritoneal signs or toxicity. Does not need emergent/urgent surgical exploration unless condition deteriorates.   2. Tolerating clear liquids, advance to low residue diet, wean TPN  3. Pain medication and antiemetics as needed with caution as may mask worsening exam  4.  Antibiotics, will switch to PO at discharge  5. No signs of bleeding, may anticoagulate with lovenox from surgical standpoint if anticoagulation needed. May resume oral anticoagulation at discharge  6. Defer management of remainder of medical comorbidities to primary and consulting teams  7. Discharge planning, anticipate 1-2 days from surgical standpoint    Tushar Wyman MD, FACS  3/9/2021  1:21 PM

## 2021-03-09 NOTE — CARE COORDINATION
Chart reviewed for discharge planning. Barrier(s) to discharge- Continue TPN--if tolerates diet today, will stop tomorrow; monitor and correct electrolytes  4. Antibiotics    Tentative discharge plan- To home with possible c services, PT& OT evals pending    Tentative discharge date- Discharge planning, anticipate 1-2 days from surgical standpoint    Case management will continue to follow progress and update discharge plan as needed.

## 2021-03-09 NOTE — PROGRESS NOTES
Pt and spouse updated on plan of care for the day. Answered all questions. Educated to call if additional questions arise. Pt and spouse satisfied.

## 2021-03-09 NOTE — PROGRESS NOTES
Nephrology Progress Note  [phone]  [phone]   [email or web address]    Patient:  Jolynn Santos   : 1945    Brief HPI    76 yo woman with h/o Hypertension, DM II, CAD/CABG, s/p MVR prosthetic,  HPL, Afib on warfarin, h/o DVT/PE on warfarin, GERD presented from cardiology office with sob and weight gain.    A Dobutamine echo in 2020 with LVEF of 20-25%  Admitted with decompensated CHF    Subjective:  -pt seen and examined  -PMSHx and meds reviewed  -No family at bedside    No acute events ON  UO not recorded  Cr is normalized  Cr is stable        ROS: neg chest pain/SOB        Meds:  Scheduled Meds:   potassium phosphate IVPB  15 mmol Intravenous Once    insulin glargine  18 Units Subcutaneous BID    furosemide  40 mg Intravenous Daily    enoxaparin  1 mg/kg Subcutaneous BID    insulin lispro  0-18 Units Subcutaneous Q4H    pantoprazole  40 mg Intravenous BID    digoxin  125 mcg Intravenous Daily    sodium chloride flush  10 mL Intravenous 2 times per day    piperacillin-tazobactam  3,375 mg Intravenous Q8H    sodium chloride flush  10 mL Intravenous 2 times per day    fat emulsion  250 mL Intravenous Once per day on Mon Thu    exenatide  10 mcg Subcutaneous BID WC    sodium chloride flush  10 mL Intravenous 2 times per day    gabapentin  300 mg Oral Nightly    montelukast  10 mg Oral Nightly     Continuous Infusions:   PN-Adult Premix  - Standard Electrolytes - Central Line 80 mL/hr at 21 2053    dextrose 100 mL/hr (21 1519)     PRN Meds:.calcium carbonate, diphenhydrAMINE, bisacodyl, phenol, sodium chloride flush, sodium chloride flush, potassium chloride, morphine, promethazine, sodium chloride flush, promethazine **OR** ondansetron, polyethylene glycol, acetaminophen **OR** acetaminophen, glucose, dextrose, glucagon (rDNA), dextrose, albuterol sulfate HFA, oxyCODONE      Vitals:  /74   Pulse 93   Temp 97.6 °F (36.4 °C) (Oral)   Resp 18   Ht 5' 8\" (1.727 m) Wt 201 lb (91.2 kg)   SpO2 96%   BMI 30.56 kg/m²      Wt Readings from Last 10 Encounters:   03/09/21 201 lb (91.2 kg)   02/26/21 213 lb (96.6 kg)   01/18/21 200 lb (90.7 kg)   01/11/21 207 lb 14.3 oz (94.3 kg)   01/04/21 208 lb (94.3 kg)   12/28/20 218 lb 7.6 oz (99.1 kg)   12/21/20 218 lb 6 oz (99.1 kg)   12/18/20 211 lb (95.7 kg)   12/15/20 210 lb (95.3 kg)   11/20/20 202 lb 9.6 oz (91.9 kg)   ]    General : AAOx3, not in pain or respiratory distress, resting in bed  HEENT : mucosa moist.   CVS: S1 S2 normal, regular rhythm, no murmurs or rubs. Lungs: Clear, no wheezing or crackles. Abd: Soft, distended, bowel sounds normal, non-tender. Ext: trace LE  edema, no cyanosis  Skin: Warm. No rashes appreciated.   : bladder non-distended, no tenderness over the bladder      Labs:  CBC with Differential:    Lab Results   Component Value Date    WBC 4.9 03/09/2021    RBC 3.61 03/09/2021    HGB 10.2 03/09/2021    HCT 31.5 03/09/2021     03/09/2021    MCV 87.0 03/09/2021    MCH 28.3 03/09/2021    MCHC 32.6 03/09/2021    RDW 17.1 03/09/2021    SEGSPCT 64.1 09/02/2020    BANDSPCT 1 01/14/2019    LYMPHOPCT 14.3 03/09/2021    MONOPCT 12.7 03/09/2021    BASOPCT 0.5 03/09/2021    MONOSABS 0.6 03/09/2021    LYMPHSABS 0.7 03/09/2021    EOSABS 0.8 03/09/2021    BASOSABS 0.0 03/09/2021     BMP:    Lab Results   Component Value Date     03/09/2021    K 3.5 03/09/2021    K 3.1 03/02/2021     03/09/2021    CO2 29 03/09/2021    BUN 21 03/09/2021    LABALBU 2.6 03/03/2021    CREATININE 0.9 03/09/2021    CALCIUM 8.4 03/09/2021    GFRAA >60 03/09/2021    LABGLOM >60 03/09/2021    LABGLOM 45 12/06/2018    GLUCOSE 159 03/09/2021     Ionized Calcium:  No results found for: IONCA  Magnesium:    Lab Results   Component Value Date    MG 2.20 03/09/2021     Phosphorus:    Lab Results   Component Value Date    PHOS 2.6 03/09/2021     Last 3 Troponin:    Lab Results   Component Value Date    TROPONINI <0.01 05/10/2020 TROPONINI <0.01 05/09/2020    TROPONINI <0.01 08/29/2019     U/A:    Lab Results   Component Value Date    COLORU YELLOW 02/28/2021    PROTEINU Negative 02/28/2021    PHUR 7.0 02/28/2021    WBCUA 31 05/11/2020    RBCUA neg 02/12/2021    RBCUA 1 05/11/2020    YEAST neg 02/12/2021    BACTERIA trace 02/12/2021    BACTERIA 1+ 05/11/2020    CLARITYU Clear 02/28/2021    SPECGRAV 1.010 02/28/2021    LEUKOCYTESUR Negative 02/28/2021    UROBILINOGEN 1.0 02/28/2021    BILIRUBINUR Negative 02/28/2021    BLOODU Negative 02/28/2021    GLUCOSEU Negative 02/28/2021       Assessment/Plan:    Acute Kidney injury  Possibly pre-renal/diuretic use in the setting of severe LV dysfunction  Base-line creatinine low 1s  Creatinine on admission was 1.5, peaked at 1.9 and is at base-line now  Continue lasix IV till able to transition to PO diet  -recommend PO torsemide 40mg bid close to discharge    CHF   Continue lasix IV, continued 40 mg IV for now and transition to torsemide once PO diet is resumed  -cardiology following    Hypokalemia: getting replaced  Mag levels normal    Atrial fibrillation rate controlled    Small bowel pneumatosis with loculated pneumoperitoneum  On TPN  S/p NGT for bowel decompression; removed 3/6/21  -will be starting clears    Anemia: monitor regarding need for transfusion          Azucena Ritchie MD.  3/9/2021  Office Phone : [phone]    Thank you for allowing us to participate in the care of this pt. I willcontinue to follow along. Please call with questions or concerns.

## 2021-03-09 NOTE — PROGRESS NOTES
[facility], [place], operated by [facility]   Daily Progress Note      Admit Date:  2/26/2021    HPI:    Ms. Waqas Moran 77 y.o. female with history of CAD/CABG in 2018, PAF with maze 2018, HTN, HLD, DVT/PE on coumadin, 2 CVs unsuccessful, sHF, admissions for pneumoperitoneum x3 in the past    She is admitted for increased SOB and swelling. Weight up 13 pounds and increased diuretics along with a prednisone taper. She had increased abdominal girth. Pneumatosis of small bowel with loculated pneumoperitoneum requiring an NG tube/TPN    Subjective:  Patient is being seen for acute on chronicsHF. There were no acute overnight cardiac events. Weight stable at 201 creat 0.9 potassium 3.5 she is up in the room, abdomen a little softer today, passing stool. She is tolerating clear liquids.  chidi at bedside. Objective:   /74   Pulse 93   Temp 97.6 °F (36.4 °C) (Oral)   Resp 18   Ht 5' 8\" (1.727 m)   Wt 201 lb (91.2 kg)   SpO2 96%   BMI 30.56 kg/m²       Intake/Output Summary (Last 24 hours) at 3/9/2021 0955  Last data filed at 3/9/2021 0950  Gross per 24 hour   Intake 94754.67 ml   Output --   Net 32572.67 ml          Physical Exam:  General:  Awake, alert, oriented in NAD  Skin:  Warm and dry. No unusual bruising or rash  Neck:  Supple. No JVD or carotid bruit appreciated  Chest:  Normal effort.   Clear to auscultation, no wheezes/rhonchi/rales  Cardiovascular:  RRR, S1/S2, no murmur/gallop/rub  Abdomen:  Distended, a little softer  Extremities:  Bilateral lower ankle edema improved  Neurological: No focal deficits  Psychological: Normal mood and affect      Medications:    potassium phosphate IVPB  15 mmol Intravenous Once    insulin glargine  18 Units Subcutaneous BID    furosemide  40 mg Intravenous Daily    enoxaparin  1 mg/kg Subcutaneous BID    insulin lispro  0-18 Units Subcutaneous Q4H    pantoprazole  40 mg Intravenous BID    digoxin  125 mcg Intravenous Daily    sodium chloride flush  10 mL atrial fibrillation during procedure.      The bioprosthetic mitral valve is well seated with a mean gradient of 5mmHg and maximum pressure gradient of 15 mmHg with heart rate of 89 bpm. Pressure half time of 82 ms. There is trivial mitral regurgitation.      Left atrial enlargement. Spontaneous echo contrast seen in the left atrium. A large stump of the left atrial appendage with no thrombus noted. Patient has history of surgical ligation of the left atrial appendage. There are spontaneous echo contrast in the atrial appendage.      The aortic valve is thickened/calcified with decreased leaflet mobility consistent with aortic stenosis. There is trivial aortic insufficiency.      There is moderate tricuspid regurgitation. Assessment:    1. Acute on chronic systolic heart failure, no ace/arb due to hypotension  2. Chronic kidney disease  3. CAD s/p CABG  4. Aortic stenosis  5. PAF, on lovenox  6. Hypotension  7.  pneumatosis of small intestine    Plan:    1. Nephrology following  2. TPN per hospital team  3. Continue digoxin 125 mcg IV daily (switch to coreg at discharge)  4. Continue IV 40 mg once a day of IV lasix for now until able to take oral (would consider holding metolazone at discharge and resuming 40 mg bid of torsemide (had been on torsemide 100 daily) and aldosterone antagonist 50 mg daily    Discussed with patient who is agreeable with plan of care. Thank you for allowing me to participate in the care of your patient.     Froylan Childers, CNS

## 2021-03-09 NOTE — PROGRESS NOTES
Hospital Medicine Progress Note     Date:  3/9/2021    PCP: Susan Palomo MD (Tel: [phone])    Date of Admission: 2/26/2021    Subjective  Patient sitting up comfortably, states she feels much better and is ready to eat regular diet. Objective  Physical exam:  Vitals: /68   Pulse 96   Temp 97.9 °F (36.6 °C) (Oral)   Resp 16   Ht 5' 8\" (1.727 m)   Wt 201 lb (91.2 kg)   SpO2 100%   BMI 30.56 kg/m²   Gen: Not in distress. Alert. Head: Normocephalic. Atraumatic. Eyes: EOMI. Good acuity. ENT: Oral mucosa moist  Neck: No JVD. No obvious thyromegaly. CVS: Nml S1S2, no MRG, RRR  Pulmomary: Clear bilaterally. No crackles. No wheezes. Gastrointestinal: Soft, NT/ND. Positive bowel sounds. Musculoskeletal: Trace edema bilateral lower extremities. Warm  Neuro: No focal deficit. Moves extremity spontaneously. Psychiatry: Appropriate affect. Not agitated. Skin: Warm, dry with normal turgor. No rash      24HR INTAKE/OUTPUT:      Intake/Output Summary (Last 24 hours) at 3/9/2021 1635  Last data filed at 3/9/2021 0950  Gross per 24 hour   Intake 94826.67 ml   Output --   Net 92954.67 ml     I/O last 3 completed shifts: In: 34627.7 [P.O.:360; IV Piggyback:200]  Out: -   No intake/output data recorded.       Meds:    insulin glargine  18 Units Subcutaneous BID    furosemide  40 mg Intravenous Daily    enoxaparin  1 mg/kg Subcutaneous BID    insulin lispro  0-18 Units Subcutaneous Q4H    pantoprazole  40 mg Intravenous BID    digoxin  125 mcg Intravenous Daily    sodium chloride flush  10 mL Intravenous 2 times per day    piperacillin-tazobactam  3,375 mg Intravenous Q8H    sodium chloride flush  10 mL Intravenous 2 times per day    fat emulsion  250 mL Intravenous Once per day on Mon Thu    exenatide  10 mcg Subcutaneous BID WC    sodium chloride flush  10 mL Intravenous 2 times per day    gabapentin  300 mg Oral Nightly    montelukast  10 mg Oral Nightly       Infusions:    PN-Adult Premix 5/20 - Standard Electrolytes - Central Line      PN-Adult Premix 5/20 - Standard Electrolytes - Central Line 80 mL/hr at 03/08/21 2053    dextrose 100 mL/hr (03/02/21 1519)         PRN Meds: calcium carbonate, diphenhydrAMINE, bisacodyl, phenol, sodium chloride flush, sodium chloride flush, potassium chloride, morphine, promethazine, sodium chloride flush, promethazine **OR** ondansetron, polyethylene glycol, acetaminophen **OR** acetaminophen, glucose, dextrose, glucagon (rDNA), dextrose, albuterol sulfate HFA, oxyCODONE    Labs/imaging:  CBC:   Recent Labs     03/07/21  1410 03/08/21  0620 03/09/21  0415   WBC 6.1 4.8 4.9   HGB 10.8* 10.4* 10.2*    162 153         BMP:    Recent Labs     03/07/21  1410 03/08/21  0620 03/09/21  0415 03/09/21  0850    141 139  --    K 3.0* 3.4* 3.4* 3.5    104 104  --    CO2 30 31 29  --    BUN 25* 24* 21*  --    CREATININE 1.0 1.0 0.9  --    GLUCOSE 147* 176* 159*  --          Hepatic: No results for input(s): AST, ALT, ALB, BILITOT, ALKPHOS in the last 72 hours. Troponin: No results for input(s): TROPONINI in the last 72 hours. BNP: No results for input(s): BNP in the last 72 hours. INR:   Recent Labs     03/08/21  0620 03/09/21 0415   INR 1.21* 1.22*           Reviewed imaging and reports noted      Assessment:  Active Problems:    Pneumoperitoneum    Pneumatosis intestinalis of small intestine    Aortic valve stenosis    Acute on chronic systolic heart failure due to valvular disease (HCC)    Stage 3 chronic kidney disease    Coronary artery disease involving native heart without angina pectoris    Hypotension  Resolved Problems:    * No resolved hospital problems.  *        Plan:  Small bowel pneumatosis with loculated pneumoperitoneum  -Appreciate general surgery recommendations  -Has completed 7 days of IV Zosyn  -Started on low fiber diet by general surgery today  -Discussed with pharmacy, appreciate their help, plan is to slowly wean off TPN starting tomorrow if patient is tolerating diet      Acute on chronic systolic congestive heart failure  -Appreciate cardiology recommendations  -Continue IV Lasix for 1 more day  -Plan to start beta-blocker, not on aspirin as already on therapeutic Lovenox, not on statin because cholesterol profile within normal limits without it      Acute kidney injury superimposed on chronic kidney disease stage IIIa  -Resolved  -Appreciate nephrology recommendations, avoid nephrotoxins      Paroxysmal atrial fibrillation  -Continue digoxin, therapeutic Lovenox  -Appreciate cardiology recommendations      CAD status post CABG  -Plan to restart beta-blocker, not on aspirin is already will be on Coumadin and not on statin secondary to lipid profile being within normal limits        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

## 2021-03-10 VITALS
HEIGHT: 68 IN | WEIGHT: 202.2 LBS | OXYGEN SATURATION: 96 % | HEART RATE: 10 BPM | SYSTOLIC BLOOD PRESSURE: 117 MMHG | BODY MASS INDEX: 30.65 KG/M2 | DIASTOLIC BLOOD PRESSURE: 65 MMHG | RESPIRATION RATE: 16 BRPM | TEMPERATURE: 97.8 F

## 2021-03-10 LAB
A/G RATIO: 0.9 (ref 1.1–2.2)
ALBUMIN SERPL-MCNC: 2.8 G/DL (ref 3.4–5)
ALP BLD-CCNC: 154 U/L (ref 40–129)
ALT SERPL-CCNC: 14 U/L (ref 10–40)
ANION GAP SERPL CALCULATED.3IONS-SCNC: 4 MMOL/L (ref 3–16)
AST SERPL-CCNC: 17 U/L (ref 15–37)
BASOPHILS ABSOLUTE: 0 K/UL (ref 0–0.2)
BASOPHILS RELATIVE PERCENT: 0.4 %
BILIRUB SERPL-MCNC: 0.4 MG/DL (ref 0–1)
BUN BLDV-MCNC: 22 MG/DL (ref 7–20)
CALCIUM SERPL-MCNC: 8.8 MG/DL (ref 8.3–10.6)
CHLORIDE BLD-SCNC: 103 MMOL/L (ref 99–110)
CO2: 28 MMOL/L (ref 21–32)
CREAT SERPL-MCNC: 0.9 MG/DL (ref 0.6–1.2)
EOSINOPHILS ABSOLUTE: 0.7 K/UL (ref 0–0.6)
EOSINOPHILS RELATIVE PERCENT: 13.4 %
GFR AFRICAN AMERICAN: >60
GFR NON-AFRICAN AMERICAN: >60
GLOBULIN: 3.1 G/DL
GLUCOSE BLD-MCNC: 148 MG/DL (ref 70–99)
GLUCOSE BLD-MCNC: 164 MG/DL (ref 70–99)
GLUCOSE BLD-MCNC: 173 MG/DL (ref 70–99)
GLUCOSE BLD-MCNC: 188 MG/DL (ref 70–99)
GLUCOSE BLD-MCNC: 192 MG/DL (ref 70–99)
HCT VFR BLD CALC: 32.3 % (ref 36–48)
HEMOGLOBIN: 10.5 G/DL (ref 12–16)
INR BLD: 1.17 (ref 0.86–1.14)
LYMPHOCYTES ABSOLUTE: 0.7 K/UL (ref 1–5.1)
LYMPHOCYTES RELATIVE PERCENT: 15.3 %
MAGNESIUM: 2 MG/DL (ref 1.8–2.4)
MCH RBC QN AUTO: 28.2 PG (ref 26–34)
MCHC RBC AUTO-ENTMCNC: 32.4 G/DL (ref 31–36)
MCV RBC AUTO: 87.2 FL (ref 80–100)
MONOCYTES ABSOLUTE: 0.7 K/UL (ref 0–1.3)
MONOCYTES RELATIVE PERCENT: 13.7 %
NEUTROPHILS ABSOLUTE: 2.8 K/UL (ref 1.7–7.7)
NEUTROPHILS RELATIVE PERCENT: 57.2 %
PDW BLD-RTO: 17.3 % (ref 12.4–15.4)
PERFORMED ON: ABNORMAL
PHOSPHORUS: 3.1 MG/DL (ref 2.5–4.9)
PLATELET # BLD: 179 K/UL (ref 135–450)
PMV BLD AUTO: 8.6 FL (ref 5–10.5)
POTASSIUM SERPL-SCNC: 3.5 MMOL/L (ref 3.5–5.1)
PROTHROMBIN TIME: 13.6 SEC (ref 10–13.2)
RBC # BLD: 3.71 M/UL (ref 4–5.2)
SODIUM BLD-SCNC: 135 MMOL/L (ref 136–145)
TOTAL PROTEIN: 5.9 G/DL (ref 6.4–8.2)
WBC # BLD: 4.9 K/UL (ref 4–11)

## 2021-03-10 PROCEDURE — 6360000002 HC RX W HCPCS: Performed by: INTERNAL MEDICINE

## 2021-03-10 PROCEDURE — 83735 ASSAY OF MAGNESIUM: CPT

## 2021-03-10 PROCEDURE — 99232 SBSQ HOSP IP/OBS MODERATE 35: CPT | Performed by: SURGERY

## 2021-03-10 PROCEDURE — APPSS15 APP SPLIT SHARED TIME 0-15 MINUTES: Performed by: NURSE PRACTITIONER

## 2021-03-10 PROCEDURE — 6360000002 HC RX W HCPCS: Performed by: NURSE PRACTITIONER

## 2021-03-10 PROCEDURE — 85610 PROTHROMBIN TIME: CPT

## 2021-03-10 PROCEDURE — 6360000002 HC RX W HCPCS: Performed by: SURGERY

## 2021-03-10 PROCEDURE — 94760 N-INVAS EAR/PLS OXIMETRY 1: CPT

## 2021-03-10 PROCEDURE — APPNB30 APP NON BILLABLE TIME 0-30 MINS: Performed by: NURSE PRACTITIONER

## 2021-03-10 PROCEDURE — 84100 ASSAY OF PHOSPHORUS: CPT

## 2021-03-10 PROCEDURE — 6370000000 HC RX 637 (ALT 250 FOR IP): Performed by: FAMILY MEDICINE

## 2021-03-10 PROCEDURE — 99232 SBSQ HOSP IP/OBS MODERATE 35: CPT | Performed by: CLINICAL NURSE SPECIALIST

## 2021-03-10 PROCEDURE — 2580000003 HC RX 258: Performed by: INTERNAL MEDICINE

## 2021-03-10 PROCEDURE — C9113 INJ PANTOPRAZOLE SODIUM, VIA: HCPCS | Performed by: NURSE PRACTITIONER

## 2021-03-10 PROCEDURE — 80053 COMPREHEN METABOLIC PANEL: CPT

## 2021-03-10 PROCEDURE — 36592 COLLECT BLOOD FROM PICC: CPT

## 2021-03-10 PROCEDURE — 85025 COMPLETE CBC W/AUTO DIFF WBC: CPT

## 2021-03-10 RX ORDER — TORSEMIDE 20 MG/1
20 TABLET ORAL DAILY
Qty: 30 TABLET | Refills: 0 | Status: SHIPPED | OUTPATIENT
Start: 2021-03-10 | End: 2021-03-16

## 2021-03-10 RX ORDER — FUROSEMIDE 40 MG/1
40 TABLET ORAL DAILY
Status: DISCONTINUED | OUTPATIENT
Start: 2021-03-11 | End: 2021-03-10 | Stop reason: HOSPADM

## 2021-03-10 RX ORDER — CARVEDILOL 3.12 MG/1
1.56 TABLET ORAL 2 TIMES DAILY WITH MEALS
Status: DISCONTINUED | OUTPATIENT
Start: 2021-03-10 | End: 2021-03-10

## 2021-03-10 RX ORDER — INSULIN LISPRO 100 [IU]/ML
0-6 INJECTION, SOLUTION INTRAVENOUS; SUBCUTANEOUS NIGHTLY
Status: DISCONTINUED | OUTPATIENT
Start: 2021-03-10 | End: 2021-03-10 | Stop reason: HOSPADM

## 2021-03-10 RX ORDER — POTASSIUM CHLORIDE 7.45 MG/ML
10 INJECTION INTRAVENOUS
Status: DISCONTINUED | OUTPATIENT
Start: 2021-03-10 | End: 2021-03-10 | Stop reason: DRUGHIGH

## 2021-03-10 RX ORDER — SODIUM CHLORIDE 9 MG/ML
INJECTION, SOLUTION INTRAVENOUS
Status: DISCONTINUED
Start: 2021-03-10 | End: 2021-03-10 | Stop reason: HOSPADM

## 2021-03-10 RX ORDER — INSULIN LISPRO 100 [IU]/ML
0-12 INJECTION, SOLUTION INTRAVENOUS; SUBCUTANEOUS
Status: DISCONTINUED | OUTPATIENT
Start: 2021-03-10 | End: 2021-03-10 | Stop reason: HOSPADM

## 2021-03-10 RX ORDER — INSULIN GLARGINE 100 [IU]/ML
INJECTION, SOLUTION SUBCUTANEOUS
Qty: 90 ML | Refills: 1 | Status: SHIPPED | OUTPATIENT
Start: 2021-03-10 | End: 2021-08-17 | Stop reason: DRUGHIGH

## 2021-03-10 RX ORDER — POTASSIUM CHLORIDE 29.8 MG/ML
20 INJECTION INTRAVENOUS
Status: DISPENSED | OUTPATIENT
Start: 2021-03-10 | End: 2021-03-10

## 2021-03-10 RX ORDER — CARVEDILOL 3.12 MG/1
3.12 TABLET ORAL 2 TIMES DAILY WITH MEALS
Qty: 60 TABLET | Refills: 0 | Status: SHIPPED | OUTPATIENT
Start: 2021-03-10 | End: 2021-03-23

## 2021-03-10 RX ORDER — CARVEDILOL 3.12 MG/1
3.12 TABLET ORAL 2 TIMES DAILY WITH MEALS
Status: DISCONTINUED | OUTPATIENT
Start: 2021-03-10 | End: 2021-03-10 | Stop reason: HOSPADM

## 2021-03-10 RX ORDER — PANTOPRAZOLE SODIUM 40 MG/1
40 TABLET, DELAYED RELEASE ORAL
Status: DISCONTINUED | OUTPATIENT
Start: 2021-03-10 | End: 2021-03-10 | Stop reason: HOSPADM

## 2021-03-10 RX ADMIN — INSULIN LISPRO 2 UNITS: 100 INJECTION, SOLUTION INTRAVENOUS; SUBCUTANEOUS at 12:21

## 2021-03-10 RX ADMIN — PANTOPRAZOLE SODIUM 40 MG: 40 TABLET, DELAYED RELEASE ORAL at 16:35

## 2021-03-10 RX ADMIN — MORPHINE SULFATE 2 MG: 2 INJECTION, SOLUTION INTRAMUSCULAR; INTRAVENOUS at 05:17

## 2021-03-10 RX ADMIN — INSULIN LISPRO 3 UNITS: 100 INJECTION, SOLUTION INTRAVENOUS; SUBCUTANEOUS at 05:16

## 2021-03-10 RX ADMIN — ENOXAPARIN SODIUM 90 MG: 100 INJECTION SUBCUTANEOUS at 08:34

## 2021-03-10 RX ADMIN — INSULIN LISPRO 3 UNITS: 100 INJECTION, SOLUTION INTRAVENOUS; SUBCUTANEOUS at 08:51

## 2021-03-10 RX ADMIN — MORPHINE SULFATE 2 MG: 2 INJECTION, SOLUTION INTRAMUSCULAR; INTRAVENOUS at 18:04

## 2021-03-10 RX ADMIN — INSULIN LISPRO 2 UNITS: 100 INJECTION, SOLUTION INTRAVENOUS; SUBCUTANEOUS at 16:38

## 2021-03-10 RX ADMIN — PANTOPRAZOLE SODIUM 40 MG: 40 INJECTION, POWDER, FOR SOLUTION INTRAVENOUS at 08:34

## 2021-03-10 RX ADMIN — POTASSIUM CHLORIDE 20 MEQ: 29.8 INJECTION, SOLUTION INTRAVENOUS at 10:37

## 2021-03-10 RX ADMIN — Medication 10 ML: at 08:59

## 2021-03-10 RX ADMIN — DIGOXIN 125 MCG: 0.25 INJECTION INTRAMUSCULAR; INTRAVENOUS at 08:34

## 2021-03-10 RX ADMIN — CARVEDILOL 3.12 MG: 3.12 TABLET, FILM COATED ORAL at 16:35

## 2021-03-10 RX ADMIN — POTASSIUM CHLORIDE 20 MEQ: 400 INJECTION, SOLUTION INTRAVENOUS at 12:39

## 2021-03-10 RX ADMIN — FUROSEMIDE 40 MG: 10 INJECTION, SOLUTION INTRAMUSCULAR; INTRAVENOUS at 08:34

## 2021-03-10 ASSESSMENT — PAIN DESCRIPTION - FREQUENCY
FREQUENCY: CONTINUOUS

## 2021-03-10 ASSESSMENT — PAIN SCALES - GENERAL
PAINLEVEL_OUTOF10: 5
PAINLEVEL_OUTOF10: 5
PAINLEVEL_OUTOF10: 1

## 2021-03-10 ASSESSMENT — PAIN DESCRIPTION - ORIENTATION
ORIENTATION: UPPER
ORIENTATION: UPPER

## 2021-03-10 ASSESSMENT — PAIN DESCRIPTION - ONSET
ONSET: ON-GOING
ONSET: ON-GOING

## 2021-03-10 ASSESSMENT — PAIN DESCRIPTION - PROGRESSION
CLINICAL_PROGRESSION: NOT CHANGED

## 2021-03-10 ASSESSMENT — PAIN - FUNCTIONAL ASSESSMENT: PAIN_FUNCTIONAL_ASSESSMENT: ACTIVITIES ARE NOT PREVENTED

## 2021-03-10 ASSESSMENT — PAIN DESCRIPTION - PAIN TYPE
TYPE: CHRONIC PAIN

## 2021-03-10 ASSESSMENT — PAIN DESCRIPTION - LOCATION: LOCATION: BACK

## 2021-03-10 ASSESSMENT — PAIN DESCRIPTION - DESCRIPTORS: DESCRIPTORS: SHARP

## 2021-03-10 NOTE — PROGRESS NOTES
Shift assessment completed. Pt is a/o X 4. VSS. Medications administered, see MAR. POC discussed and all questions answered. Pt provided with fresh ice water. Pt has belongings and call light in reach. Bed is locked and in lowest position, bed alarm is refused by pt and pt agrees to call for assistance to ambulate. Pt denies further needs, will continue to monitor.

## 2021-03-10 NOTE — PROGRESS NOTES
Nutrition Note      Pt continues to tolerate low fiber, Carb Control diet. Will add MARINA modifier d/t hx of CHF. PO Intake:   3/9 Dinner: yogurt and Magic cup    3/10 Breakfast: 50% scrambled eggs, bites of potatoes, 1/2 piece toast    Reports lower mid abdominal pain when she first starting eating breakfast, but pain eased off as she continued to eat. Clinimix 5/20 decreased to 40mL/hr. Recommend to run until bag runs out.        Electronically signed by Latanya Myers RD, LD on 3/10/21 at 11:06 AM EST  Contact: [phone]

## 2021-03-10 NOTE — PROGRESS NOTES
[facility]        Progress Note    Patient Name: Bobby Romero  MRN: [id number]  YOB: 1945  Date of Evaluation: 3/10/2021    Chief Complaint: Abdominal pain    Subjective:  No acute events overnight  Denies abdominal pain at present ; did have some mild self-limiting lower abdominal cramping while eating breakfast this morning, otherwise tolerating diet  Denies nausea or vomiting  Passing flatus and stool  Up to chair at this time      Vital Signs:  Patient Vitals for the past 24 hrs:   BP Temp Temp src Pulse Resp SpO2 Weight   03/10/21 0841 -- -- -- -- 16 95 % --   03/10/21 0815 121/65 98.8 °F (37.1 °C) Oral 92 16 99 % --   03/10/21 0631 -- -- -- 93 -- -- --   03/10/21 0458 138/79 97.2 °F (36.2 °C) Temporal 94 18 98 % 202 lb 3.2 oz (91.7 kg)   03/10/21 0234 -- -- -- 96 -- -- --   03/10/21 0003 106/62 98.2 °F (36.8 °C) Oral 98 16 97 % --   21 2205 -- -- -- 122 -- -- --   21 1936 107/63 98.4 °F (36.9 °C) Oral 92 16 98 % --   21 1652 -- -- -- -- -- 100 % --   21 1512 136/68 97.9 °F (36.6 °C) Oral 96 16 100 % --      TEMPERATURE HISTORY 24H: Temp (24hrs), Av.1 °F (36.7 °C), Min:97.2 °F (36.2 °C), Max:98.8 °F (37.1 °C)    BLOOD PRESSURE HISTORY: Systolic (80CIE), REV:742 , Min:106 , OEE:177    Diastolic (89GWT), JYE:48, Min:62, Max:79      Intake/Output:  I/O last 3 completed shifts: In: 6506 [P.O.:1080; I.V.:10]  Out: -   No intake/output data recorded.   Drain/tube Output:       Physical Exam:  General: awake, alert, oriented to  person, place, time  Cardiovascular:  regular rate and rhythm and no murmur noted  Lungs: clear to auscultation  Abdomen: soft, nontender, mildly distended, active bowel sounds normal     Labs:  CBC:    Recent Labs     21  0620 21  0415 03/10/21  0500   WBC 4.8 4.9 4.9   HGB 10.4* 10.2* 10.5*   HCT 32.2* 31.5* 32.3*    153 179     BMP:    Recent Labs     21  0620 21  0415 03/09/21  0850 03/10/21  0500    139  --  135*   K 3.4* 3.4* 3.5 3.5    104  --  103   CO2 31 29  --  28   BUN 24* 21*  --  22*   CREATININE 1.0 0.9  --  0.9   GLUCOSE 176* 159*  --  188*     Hepatic:    Recent Labs     03/10/21  0500   AST 17   ALT 14   BILITOT 0.4   ALKPHOS 154*     Amylase:    Lab Results   Component Value Date    AMYLASE 38 12/07/2015     Lipase:    Lab Results   Component Value Date    LIPASE 35.0 05/10/2020    LIPASE 72.0 04/24/2019    LIPASE 78.0 01/14/2019      Mag:    Lab Results   Component Value Date    MG 2.00 03/10/2021    MG 2.20 03/09/2021     Phos:     Lab Results   Component Value Date    PHOS 3.1 03/10/2021    PHOS 2.6 03/09/2021      Coags:   Lab Results   Component Value Date    PROTIME 13.6 03/10/2021    INR 1.17 03/10/2021    APTT 33.7 03/05/2021       Cultures:  Anaerobic culture  No results found for: LABANAE  Fungus stain  No results found for requested labs within last 30 days. Gram stain  No results found for requested labs within last 30 days. Organism  Lab Results   Component Value Date/Time    ORG Staphylococcus coagulase-negative (A) 10/01/2018 08:55 PM    ORG Staphylococcus coagulase negative DNA Detected (A) 10/01/2018 08:55 PM    ORG Staphylococcus coagulase-negative (A) 10/01/2018 08:55 PM     Surgical culture  No results found for: CXSURG  Blood culture 1  No results found for requested labs within last 30 days. Blood culture 2  No results found for requested labs within last 30 days. Fecal occult  No results found for requested labs within last 30 days. GI bacterial pathogens by PCR  No results found for requested labs within last 30 days. C. difficile  No results found for requested labs within last 30 days. Urine culture  Lab Results   Component Value Date    LABURIN  02/12/2021     <50,000 CFU/ml mixed skin/urogenital art.  No further workup       Pathology:  No relevant pathology     Imaging:  I have personally reviewed the following films:    No results found. Scheduled Meds:   insulin lispro  0-12 Units Subcutaneous TID WC    insulin lispro  0-6 Units Subcutaneous Nightly    [START ON 3/11/2021] furosemide  40 mg Oral Daily    carvedilol  1.5625 mg Oral BID WC    pantoprazole  40 mg Oral BID AC    insulin glargine  26 Units Subcutaneous Daily    insulin glargine  18 Units Subcutaneous Nightly    enoxaparin  1 mg/kg Subcutaneous BID    fat emulsion  250 mL Intravenous Once per day on Mon Thu    exenatide  10 mcg Subcutaneous BID WC    sodium chloride flush  10 mL Intravenous 2 times per day    gabapentin  300 mg Oral Nightly    montelukast  10 mg Oral Nightly     Continuous Infusions:   sodium chloride      PN-Adult Premix 5/20 - Standard Electrolytes - Central Line 80 mL/hr at 03/09/21 1846    dextrose 100 mL/hr (03/02/21 1519)     PRN Meds:.calcium carbonate, diphenhydrAMINE, bisacodyl, phenol, sodium chloride flush, sodium chloride flush, potassium chloride, morphine, promethazine, sodium chloride flush, promethazine **OR** ondansetron, polyethylene glycol, acetaminophen **OR** acetaminophen, glucose, dextrose, glucagon (rDNA), dextrose, albuterol sulfate HFA, oxyCODONE      Assessment:  Small bowel pneumatosis with loculated pneumoperitoneum  Acute on chronic CHF  CAD, status-post CABG  Acute on chronic kidney disease  Atrial fibrillation, on Coumadin  History of DVT/PE        Plan:  1. Exam remains benign, only mild bloating; last CT stable; no plans for surgical intervention at this time  2. Tolerating low fiber diet; monitor for bowel function--passing stool  3. Stop TPN; monitor and correct electrolytes  4. Antibiotics  5. Activity as tolerated, ambulate TID, up to chair for meals  6. PRN analgesics and antiemetics--minimizing narcotics as tolerated  7. DVT prophylaxis with SCD's; okay to resume oral anticoagulant  8.  Management of medical comorbid etiologies per primary team and consulting services  9. Disposition: Okay for discharge home from a surgical perspective; follow up with Dr. Li Guzman in 2 weeks    EDUCATION:  Educated patient on plan of care and disease process--all questions answered. Plans discussed with patient and nursing. Reviewed and discussed with Dr. Li Guzman. Signed:  SOLEDAD Coats CNP  3/10/2021 12:52 PM     I have personally performed a face to face diagnostic evaluation on this patient. I have interviewed and examined the patient and I agree with the assessment above. In summary, my findings and plan are the following:   Ms. Bjorn Pisano is a 77 y.o. female who presents with   Small bowel pneumatosis with loculated pneumoperitoneum  CHF  CAD  Atrial fibrillation  History of DVT/PE  Medical coagulopathy, on coumadin     Plan:  1. Pneumatosis of small bowel with loculated pneumoperitoneum is improving, vital signs are stable, lactic acid normal, without peritoneal signs or toxicity. Does not need emergent/urgent surgical exploration unless condition deteriorates.   2. Tolerating diet, wean TPN  3. Pain medication and antiemetics as needed with caution as may mask worsening exam  4. Antibiotics, will switch to PO at discharge  5. No signs of bleeding, may anticoagulate with lovenox from surgical standpoint if anticoagulation needed. May resume oral anticoagulation at discharge  6. Defer management of remainder of medical comorbidities to primary and consulting teams  7. Discharge planning, may discharge from surgical standpoint when medically stable    Tushar Guzman MD, FACS  3/10/2021  1:06 PM

## 2021-03-10 NOTE — PROGRESS NOTES
Hospital Medicine Progress Note     Date:  3/10/2021    PCP: Clau Abreu MD (Tel: [phone])    Date of Admission: 2/26/2021       Subjective  Patient sitting up comfortably,  is at the bedside. Patient states she is feeling better and has been slowly tolerating a little bit more diet today. Objective  Physical exam:  Vitals: /65   Pulse 92   Temp 98.8 °F (37.1 °C) (Oral)   Resp 16   Ht 5' 8\" (1.727 m)   Wt 202 lb 3.2 oz (91.7 kg)   SpO2 95%   BMI 30.74 kg/m²   Gen: Not in distress. Alert. Head: Normocephalic. Atraumatic. Eyes: EOMI. Good acuity. ENT: Oral mucosa moist  Neck: No JVD. No obvious thyromegaly. CVS: Nml S1S2, no MRG, RRR  Pulmomary: Clear bilaterally. No crackles. No wheezes. Gastrointestinal: Soft, NT/ND. Positive bowel sounds. Musculoskeletal: Trace edema bilateral lower extremities. Warm  Neuro: No focal deficit. Moves extremity spontaneously. Psychiatry: Appropriate affect. Not agitated. Skin: Warm, dry with normal turgor. No rash      24HR INTAKE/OUTPUT:      Intake/Output Summary (Last 24 hours) at 3/10/2021 1416  Last data filed at 3/10/2021 0458  Gross per 24 hour   Intake 490 ml   Output --   Net 490 ml     I/O last 3 completed shifts: In: 0703 [P.O.:1080; I.V.:10]  Out: -   No intake/output data recorded.       Meds:    insulin lispro  0-12 Units Subcutaneous TID WC    insulin lispro  0-6 Units Subcutaneous Nightly    [START ON 3/11/2021] furosemide  40 mg Oral Daily    carvedilol  1.5625 mg Oral BID WC    pantoprazole  40 mg Oral BID AC    insulin glargine  26 Units Subcutaneous Daily    insulin glargine  18 Units Subcutaneous Nightly    enoxaparin  1 mg/kg Subcutaneous BID    fat emulsion  250 mL Intravenous Once per day on Mon Thu    exenatide  10 mcg Subcutaneous BID WC    sodium chloride flush  10 mL Intravenous 2 times per day    gabapentin  300 mg Oral Nightly    montelukast  10 mg Oral Nightly       Infusions:    sodium chloride      PN-Adult Premix 5/20 - Standard Electrolytes - Central Line 80 mL/hr at 03/09/21 1846    dextrose 100 mL/hr (03/02/21 1519)         PRN Meds: calcium carbonate, diphenhydrAMINE, bisacodyl, phenol, sodium chloride flush, sodium chloride flush, potassium chloride, morphine, promethazine, sodium chloride flush, promethazine **OR** ondansetron, polyethylene glycol, acetaminophen **OR** acetaminophen, glucose, dextrose, glucagon (rDNA), dextrose, albuterol sulfate HFA, oxyCODONE    Labs/imaging:  CBC:   Recent Labs     03/08/21  0620 03/09/21  0415 03/10/21  0500   WBC 4.8 4.9 4.9   HGB 10.4* 10.2* 10.5*    153 179         BMP:    Recent Labs     03/08/21  0620 03/09/21  0415 03/09/21  0850 03/10/21  0500    139  --  135*   K 3.4* 3.4* 3.5 3.5    104  --  103   CO2 31 29  --  28   BUN 24* 21*  --  22*   CREATININE 1.0 0.9  --  0.9   GLUCOSE 176* 159*  --  188*         Hepatic:   Recent Labs     03/10/21  0500   AST 17   ALT 14   BILITOT 0.4   ALKPHOS 154*       Troponin: No results for input(s): TROPONINI in the last 72 hours. BNP: No results for input(s): BNP in the last 72 hours. INR:   Recent Labs     03/08/21  0620 03/09/21  0415 03/10/21  0500   INR 1.21* 1.22* 1.17*           Reviewed imaging and reports noted      Assessment:  Active Problems:    Pneumoperitoneum    Pneumatosis intestinalis of small intestine    Aortic valve stenosis    Acute on chronic systolic heart failure due to valvular disease (HCC)    Stage 3 chronic kidney disease    Coronary artery disease involving native heart without angina pectoris    Hypotension  Resolved Problems:    * No resolved hospital problems.  *        Plan:  Small bowel pneumatosis with loculated pneumoperitoneum  -Appreciate general surgery recommendations  -Has completed 7 days of IV Zosyn  -Tolerating low fiber diet even though eating small portions  -Discussed with pharmacy, appreciate their help, TPN will be weaned off

## 2021-03-10 NOTE — DISCHARGE SUMMARY
Hospital Discharge Summary    Patient's PCP: Luisa Horn MD  Admit Date: 2/26/2021   Discharge Date: 3/10/2021    Admitting Physician: Dr. Dennis Cash MD  Discharge Physician: Dr. Ed Cunningham:   IP CONSULT TO Loretta Marin TO DOSE TPN  IP CONSULT TO GI    Brief HPI:   Dewayne Bush is a 77 y.o. female who presented from cardiology office. Patient was seen in the cardiology because of worsening shortness of breath and weight gain. She did gain about 12 to 13 pounds in a week's time. She does mention that she has been compliant with her medications but not so compliant with her salt intake. She denies any recent chest pain palpitations. She denies any fever cough phlegm diarrhea constipation trouble pain burning sensation while peeing one-sided weakness     Does mention was given prednisone taper by PCP about 2 weeks ago.     She does has a history of coronary artery disease, history of congestive heart failure, history of prosthetic mitral valve, history of atrial fibrillation on Coumadin. History of eosinophilia. Brief hospital course:   Small bowel pneumatosis with loculated pneumoperitoneum  -Appreciate general surgery recommendations  -Has completed 7 days of IV Zosyn  -Tolerating low fiber diet even though eating small portions  -Discussed with pharmacy, appreciate their help, TPN will be weaned off today        Acute on chronic systolic congestive heart failure  -Appreciate cardiology recommendations  -Continue beta-blocker, p.o.  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 Patient discharged home with medication adjustments, follow-up with PCP in 1 week and with cardiology, surgery and nephrology per their recommendations. Discharge Diagnoses: Active Problems:    Pneumoperitoneum    Pneumatosis intestinalis of small intestine    Aortic valve stenosis    Acute on chronic systolic heart failure due to valvular disease (HCC)    Stage 3 chronic kidney disease    Coronary artery disease involving native heart without angina pectoris    Hypotension  Resolved Problems:    * No resolved hospital problems. *      Physical Exam: /65   Pulse 92   Temp 98.8 °F (37.1 °C) (Oral)   Resp 16   Ht 5' 8\" (1.727 m)   Wt 202 lb 3.2 oz (91.7 kg)   SpO2 95%   BMI 30.74 kg/m²   Gen/overall appearance: Not in acute distress. Alert. Head: Normocephalic, atraumatic  Eyes: EOMI, good acuity  ENT:- Oral mucosa moist  Neck: No JVD, thyromegaly  CVS: Nml S1S2, no MRG, RRR  Pulm: Clear bilaterally. No crackles/wheezes  Gastrointestinal: Soft, NT/ND, +BS  Musculoskeletal: trace edema bilateral lower extremities. Warm  Neuro: No focal deficit. Moves extremity spontaneously. Psychiatry: Appropriate affect. Not agitated. Skin: Warm, dry with normal turgor.  No rash        Significant diagnostic studies that may require follow up:  Ct Abdomen Pelvis Wo Contrast Additional Contrast? Oral    Result Date: 3/4/2021  EXAMINATION: CT OF THE current study may simply represent the known pneumatosis this patient has previously been shown to have. CT scan of the abdomen and pelvis is recommended for further evaluation. Large volume of stool throughout the colon. Critical results were called by Dr. Zachary Mckenna MD to [facility] on 3/2/2021 at 00:26. Us Renal Complete    Result Date: 3/1/2021  EXAMINATION: RETROPERITONEAL ULTRASOUND OF THE KIDNEYS AND URINARY BLADDER 3/1/2021 COMPARISON: None HISTORY: ORDERING SYSTEM PROVIDED HISTORY: dr navarro TECHNOLOGIST PROVIDED HISTORY: Reason for exam:->dr navarro FINDINGS: Kidneys: The right kidney measures 10.0 x 6.6 x 5.4 cm in length and the left kidney measures 9.9 x 5.4 x 4.5 cm in length. Kidneys demonstrate normal cortical echogenicity. No evidence of hydronephrosis or intrarenal stones. Bladder: Bladder is incompletely distended and otherwise unremarkable in appearance. Unremarkable ultrasound of the kidneys and urinary bladder. Ct Abdomen Pelvis W Iv Contrast Additional Contrast? Oral    Result Date: 3/7/2021  EXAMINATION: CT OF THE ABDOMEN AND PELVIS WITH CONTRAST 3/7/2021 1:56 pm TECHNIQUE: CT of the abdomen and pelvis was performed with the administration of intravenous contrast. Multiplanar reformatted images are provided for review. Dose modulation, iterative reconstruction, and/or weight based adjustment of the mA/kV was utilized to reduce the radiation dose to as low as reasonably achievable. COMPARISON: 03/04/2021 and prior. HISTORY: ORDERING SYSTEM PROVIDED HISTORY: Re-eval pneumotosis and penumoperitoneum TECHNOLOGIST PROVIDED HISTORY: Reason for exam:->Re-eval pneumotosis and penumoperitoneum Additional Contrast?->Oral Acuity: Acute Type of Exam: Initial FINDINGS: Lower Chest: Scattered tree-in-bud opacities and more focal bandlike opacity at the right lung base appears similar to the prior study. Organs: The patient is status post cholecystectomy.   Mild dilation of the biliary collecting system again noted likely related to post cholecystectomy ectasia. The liver is otherwise grossly unremarkable in appearance. The spleen, adrenal glands, pancreas and kidneys are grossly unremarkable in appearance. GI/Bowel: The stomach demonstrates mild edematous wall thickening distally. No focal inflammatory change noted. There is extensive pneumatosis again noted. The findings are similar when accounting for differences in technique to the comparison. There is some redistribution from the comparison. There is increased air tracking along the sigmoid colon compared to the previous exam. There is oral contrast extending all the way to the rectum. The appendix is visualized and appears normal. No extravasation of oral contrast or significant fluid collections identified. Pelvis: Large left adnexal cyst measuring 8.5 x 6.4 cm again noted. The urinary bladder, uterus and right ovary are grossly unremarkable. Peritoneum/Retroperitoneum: The aorta is normal in caliber. No suspicious retroperitoneal adenopathy is noted. Bones/Soft Tissues: No acute osseous abnormality noted. Extensive pneumatosis or pneumoperitoneum is again noted. The findings are similar to the comparison with redistribution compared to the prior study. No obvious evidence of perforation noted. Mild wall thickening of the distal stomach suggesting possible peptic ulcer disease. Large cystic lesion within the left adnexa again noted, stable. MRI of the pelvis is recommended if this is not surgically evaluated. Xr Chest Portable    Result Date: 3/2/2021  EXAMINATION: ONE XRAY VIEW OF THE CHEST 3/2/2021 11:38 am COMPARISON: None. HISTORY: ORDERING SYSTEM PROVIDED HISTORY: confirm placement of PICC TECHNOLOGIST PROVIDED HISTORY: Reason for exam:->confirm placement of PICC Reason for Exam: confirm placement of PICC Acuity: Acute Type of Exam: Initial FINDINGS: The lungs are clear.  The mediastinum and cardiac silhouette are unremarkable. There is a right-sided PICC line in place, the tip projects over the cavoatrial junction. No acute abnormality. Xr Chest Portable    Result Date: 2/26/2021  EXAMINATION: ONE XRAY VIEW OF THE CHEST 2/26/2021 2:56 pm COMPARISON: 09/15/2020 HISTORY: ORDERING SYSTEM PROVIDED HISTORY: dyspnea, hf TECHNOLOGIST PROVIDED HISTORY: Reason for exam:->dyspnea, hf Reason for Exam: dyspnea, hf Acuity: Unknown Type of Exam: Ongoing FINDINGS: Monitor wires project over the chest.  Status post median sternotomy and CABG. Loop recorder projects over the heart. Heart size is enlarged, stable. Pulmonary vascularity is normal.  No focal consolidation, pleural effusion, or pneumothorax. Stable chest.  No acute cardiopulmonary abnormality. Stable mild cardiomegaly. Xr Abdomen For Ng/og/ne Tube Placement    Result Date: 3/2/2021  EXAMINATION: ONE SUPINE XRAY VIEW(S) OF THE ABDOMEN 3/2/2021 1:49 pm COMPARISON: March 1, 2021 HISTORY: ORDERING SYSTEM PROVIDED HISTORY: Confirm tip placement of NG tube TECHNOLOGIST PROVIDED HISTORY: Reason for exam:->Confirm tip placement of NG tube Portable? ->Yes Reason for Exam: NG tube placement Acuity: Unknown Type of Exam: Unknown FINDINGS: Median sternotomy. Left atrial Appendage clip is noted. Patient has had a valvuloplasty. The cardiac silhouette is mildly enlarged. There is a small left pleural effusion and left base opacity. Right PICC catheter tip overlies the right atrium. Enteric catheter tip overlies the body of the stomach. Side hole is distal to the GE junction. Bowel gas pattern is unremarkable. Enteric catheter tip overlies the body of the stomach. Side hole is distal to the GE junction. Treatments: As above.       Discharge Medications:     Medication List      CHANGE how you take these medications    carvedilol 3.125 MG tablet  Commonly known as: COREG  Take 1 tablet by mouth 2 times daily (with meals)  What changed:   · medication strength  · how much to take  · when to take this     HumaLOG KwikPen 100 UNIT/ML Sopn  Generic drug: insulin lispro (1 Unit Dial)  TAKE AS INSTRUCTED BY YOUR PRESCRIBER  What changed: See the new instructions. Lantus SoloStar 100 UNIT/ML injection pen  Generic drug: insulin glargine  INJECT 26 UNITS IN THE MORNING AND 18 UNITS AT BEDTIME  What changed: additional instructions     torsemide 20 MG tablet  Commonly known as: DEMADEX  Take 1 tablet by mouth daily  What changed:   · medication strength  · See the new instructions. warfarin 5 MG tablet  Commonly known as: COUMADIN  Take as directed. If you are unsure how to take this medication, talk to your nurse or doctor. Original instructions: TAKE 1 TABLET DAILY  What changed: See the new instructions. CONTINUE taking these medications    ACETAMINOPHEN PO     * albuterol (2.5 MG/3ML) 0.083% nebulizer solution  Commonly known as: PROVENTIL  Take 3 mLs by nebulization every 6 hours as needed for Wheezing     * albuterol sulfate  (90 Base) MCG/ACT inhaler  USE 2 INHALATIONS EVERY 6 HOURS AS NEEDED FOR WHEEZING     aspirin 81 MG chewable tablet  Take 1 tablet by mouth daily     BD Pen Needle Pauline U/F 32G X 4 MM Misc  Generic drug: Insulin Pen Needle  USE 1 PEN NEEDLE FOUR TIMES A DAY     Byetta 10 MCG Pen 10 MCG/0.04ML injection  Generic drug: exenatide  Inject 0.04 mLs into the skin 2 times daily (with meals)     cyanocobalamin 500 MCG tablet  Take 1 tablet by mouth daily     Embrace Pro Glucose Meter Mei  1 Device by Does not apply route 4 times daily     FreeStyle Lancets Misc  1 each by Does not apply route 4 times daily     FREESTYLE LITE strip  Generic drug: blood glucose test strips  USE TO TEST FOUR TIMES A DAY     gabapentin 300 MG capsule  Commonly known as: NEURONTIN  Take 1 capsule by mouth nightly for 90 days.  Intended supply: 30 days     montelukast 10 MG tablet  Commonly known as: SINGULAIR  TAKE 1 TABLET NIGHTLY     nystatin 381628 UNIT/ML suspension  Commonly known as: MYCOSTATIN  TAKE ONE TEASPOONFUL BY MOUTH FOUR TIMES A DAY FOR 5 DAYS     omeprazole 20 MG delayed release capsule  Commonly known as: PRILOSEC     ondansetron 4 MG tablet  Commonly known as: ZOFRAN  Take 1 tablet by mouth 3 times daily as needed for Nausea or Vomiting     OXYCODONE HCL PO     polyethylene glycol 17 GM/SCOOP powder  Commonly known as: GLYCOLAX     potassium chloride 10 MEQ extended release tablet  Commonly known as: KLOR-CON M  TAKE 1 TABLET DAILY         * This list has 2 medication(s) that are the same as other medications prescribed for you. Read the directions carefully, and ask your doctor or other care provider to review them with you. STOP taking these medications    methocarbamol 750 MG tablet  Commonly known as: ROBAXIN     metOLazone 2.5 MG tablet  Commonly known as: ZAROXOLYN     predniSONE 10 MG tablet  Commonly known as: DELTASONE     spironolactone 50 MG tablet  Commonly known as: ALDACTONE           Where to Get Your Medications      These medications were sent to [address] [place] [address]    Phone: [phone]   · Byetta 10 MCG Pen 10 MCG/0.04ML injection     These medications were sent to [facility], [address] [place] [address]  [address]ia [address] [address]    Phone: [phone]   · carvedilol 3.125 MG tablet  · Lantus SoloStar 100 UNIT/ML injection pen  · torsemide 20 MG tablet         Activity: activity as tolerated  Diet: PN-Adult Premix 5/20 - Standard Electrolytes - Central Line  Dietary Nutrition Supplements: Clear Liquid Oral Supplement, Frozen Oral Supplement  DIET LOW FIBER;  Carb Control: 4 carb choices (60 gms)/meal; No Added Salt (3-4 GM)      Disposition: home  Discharged Condition: Stable  Follow Up:   Joane Fothergill, MD  [address] [facility] [place]

## 2021-03-10 NOTE — PROGRESS NOTES
Physical Therapy  Discharge Summary  Shola Patel    Attempted to see pt for PT treatment. Pt declined need for therapy at this time. States she has been moving around room without difficulty and just ambulated in hallway with spouse. Spouse and patient report they feels she has returned to baseline function. Will discharge acute PT at this time. Thanks, Thresea Osler, [place], DPT [id number]

## 2021-03-10 NOTE — PROGRESS NOTES
Clinical Pharmacy Note    Pharmacy consulted by Dr. Barrientos Learn to manage TPN    Current TPN rate: 80ml/hr  Goal TPN rate: 80ml/hr    Labs:  General Labs:  BMP:    Lab Results   Component Value Date     03/10/2021    K 3.5 03/10/2021    K 3.1 03/02/2021     03/10/2021    CO2 28 03/10/2021    BUN 22 03/10/2021    LABALBU 2.8 03/10/2021    CREATININE 0.9 03/10/2021    CALCIUM 8.8 03/10/2021    GFRAA >60 03/10/2021    LABGLOM >60 03/10/2021    LABGLOM 45 12/06/2018    GLUCOSE 188 03/10/2021     Blood sugars: 188-242    Electrolyte replacement as follows:   Replace potassium with 40 mEq of potassium chloride administered IV over 2 hours    Blood sugar management:  Plan to continue q4 hour Humalog to high dose sliding scale. Plan to wean TPN to 40 ml/hr. Discussed with RN, patient tolerating low fiber diet well. Recommend decreasing rate to 40 mL/hr now then will stop tonight at 1800 unless surgery/hospitalist wants to dc sooner. Thank you for allowing pharmacy to participate in the care of this patient.     Caty Marks, PharmD 3/10/2021

## 2021-03-10 NOTE — CARE COORDINATION
Patient discharged on 3/10/21/ to home  with family.     All discharge needs met per case management

## 2021-03-10 NOTE — PLAN OF CARE
Problem: Nausea/Vomiting:  Goal: Absence of nausea/vomiting  Description: Absence of nausea/vomiting  3/10/2021 0136 by Nicolas Hernandez RN  Outcome: Met This Shift  Note: Pt denies N/V currently. Problem: HEMODYNAMIC STATUS  Goal: Patient has stable vital signs and fluid balance  3/10/2021 0136 by Nicolas Hernandez RN  Outcome: Ongoing  Note: VSS. +1 pitting edema in BLE. Receiving IV Lasix qd. I&O recorded. Pt denies SOB. Goal: Control of chronic pain  Description: Control of chronic pain  3/10/2021 0136 by Nicolas Hernandez RN  Outcome: Ongoing  Note:    Pt c/o pain. Pt assessed for breathing and BP. Pt educated on pain scale. Medication refused by pt. Pt repositioned. Stimuli reduced. Rest promoted. Pain reassessed. Will continue to monitor.

## 2021-03-10 NOTE — PROGRESS NOTES
Nephrology Progress Note  [phone]  [phone]   [email or web address]    Patient:  Mary Carmen Cohen   : 1945    Brief HPI    78 yo woman with h/o Hypertension, DM II, CAD/CABG, s/p MVR prosthetic,  HPL, Afib on warfarin, h/o DVT/PE on warfarin, GERD presented from cardiology office with sob and weight gain.    A Dobutamine echo in 2020 with LVEF of 20-25%  Admitted with decompensated CHF    Subjective:  -pt seen and examined  -PMSHx and meds reviewed  -family at bedside    No acute events ON  Bp stable  UO not recorded  Tolerating PO      ROS: neg chest pain/SOB        Meds:  Scheduled Meds:   insulin glargine  26 Units Subcutaneous Daily    insulin glargine  18 Units Subcutaneous Nightly    furosemide  40 mg Intravenous Daily    enoxaparin  1 mg/kg Subcutaneous BID    insulin lispro  0-18 Units Subcutaneous Q4H    pantoprazole  40 mg Intravenous BID    digoxin  125 mcg Intravenous Daily    sodium chloride flush  10 mL Intravenous 2 times per day    sodium chloride flush  10 mL Intravenous 2 times per day    fat emulsion  250 mL Intravenous Once per day on Mon Thu    exenatide  10 mcg Subcutaneous BID WC    sodium chloride flush  10 mL Intravenous 2 times per day    gabapentin  300 mg Oral Nightly    montelukast  10 mg Oral Nightly     Continuous Infusions:   PN-Adult Premix  - Standard Electrolytes - Central Line 80 mL/hr at 21 1846    dextrose 100 mL/hr (21 1519)     PRN Meds:.calcium carbonate, diphenhydrAMINE, bisacodyl, phenol, sodium chloride flush, sodium chloride flush, potassium chloride, morphine, promethazine, sodium chloride flush, promethazine **OR** ondansetron, polyethylene glycol, acetaminophen **OR** acetaminophen, glucose, dextrose, glucagon (rDNA), dextrose, albuterol sulfate HFA, oxyCODONE      Vitals:  /79   Pulse 93   Temp 97.2 °F (36.2 °C) (Temporal)   Resp 18   Ht 5' 8\" (1.727 m)   Wt 202 lb 3.2 oz (91.7 kg)   SpO2 98%   BMI 30.74 kg/m² Wt Readings from Last 10 Encounters:   03/10/21 202 lb 3.2 oz (91.7 kg)   02/26/21 213 lb (96.6 kg)   01/18/21 200 lb (90.7 kg)   01/11/21 207 lb 14.3 oz (94.3 kg)   01/04/21 208 lb (94.3 kg)   12/28/20 218 lb 7.6 oz (99.1 kg)   12/21/20 218 lb 6 oz (99.1 kg)   12/18/20 211 lb (95.7 kg)   12/15/20 210 lb (95.3 kg)   11/20/20 202 lb 9.6 oz (91.9 kg)   ]    General : AAOx3, not in pain or respiratory distress, resting in bed  HEENT : mucosa moist.   CVS: S1 S2 normal, regular rhythm, no murmurs or rubs. Lungs: Clear, no wheezing or crackles. Abd: Soft, distended, bowel sounds normal, non-tender. Ext: trace LE  edema, no cyanosis  Skin: Warm. No rashes appreciated.   : bladder non-distended, no tenderness over the bladder      Labs:  CBC with Differential:    Lab Results   Component Value Date    WBC 4.9 03/10/2021    RBC 3.71 03/10/2021    HGB 10.5 03/10/2021    HCT 32.3 03/10/2021     03/10/2021    MCV 87.2 03/10/2021    MCH 28.2 03/10/2021    MCHC 32.4 03/10/2021    RDW 17.3 03/10/2021    SEGSPCT 64.1 09/02/2020    BANDSPCT 1 01/14/2019    LYMPHOPCT 15.3 03/10/2021    MONOPCT 13.7 03/10/2021    BASOPCT 0.4 03/10/2021    MONOSABS 0.7 03/10/2021    LYMPHSABS 0.7 03/10/2021    EOSABS 0.7 03/10/2021    BASOSABS 0.0 03/10/2021     BMP:    Lab Results   Component Value Date     03/10/2021    K 3.5 03/10/2021    K 3.1 03/02/2021     03/10/2021    CO2 28 03/10/2021    BUN 22 03/10/2021    LABALBU 2.8 03/10/2021    CREATININE 0.9 03/10/2021    CALCIUM 8.8 03/10/2021    GFRAA >60 03/10/2021    LABGLOM >60 03/10/2021    LABGLOM 45 12/06/2018    GLUCOSE 188 03/10/2021     Ionized Calcium:  No results found for: IONCA  Magnesium:    Lab Results   Component Value Date    MG 2.00 03/10/2021     Phosphorus:    Lab Results   Component Value Date    PHOS 3.1 03/10/2021     Last 3 Troponin:    Lab Results   Component Value Date    TROPONINI <0.01 05/10/2020    TROPONINI <0.01 05/09/2020    TROPONINI <0.01 08/29/2019     U/A:    Lab Results   Component Value Date    COLORU YELLOW 02/28/2021    PROTEINU Negative 02/28/2021    PHUR 7.0 02/28/2021    WBCUA 31 05/11/2020    RBCUA neg 02/12/2021    RBCUA 1 05/11/2020    YEAST neg 02/12/2021    BACTERIA trace 02/12/2021    BACTERIA 1+ 05/11/2020    CLARITYU Clear 02/28/2021    SPECGRAV 1.010 02/28/2021    LEUKOCYTESUR Negative 02/28/2021    UROBILINOGEN 1.0 02/28/2021    BILIRUBINUR Negative 02/28/2021    BLOODU Negative 02/28/2021    GLUCOSEU Negative 02/28/2021       Assessment/Plan:    Acute Kidney injury: Possibly pre-renal/diuresis  Base-line creatinine low 1s  Creatinine on admission was 1.5, peaked at 1.9 and is at base-line now  Continue lasix IV till able to transition to PO diet  -recommend PO torsemide 40mg bid close to discharge    Nephrology will sign off. Please call with questions    CHF   Continue lasix IV, continued 40 mg IV for now and transition to torsemide once PO diet is resumed  -cardiology following    Hypokalemia: getting replaced  Mag levels normal    Atrial fibrillation rate controlled    Small bowel pneumatosis with loculated pneumoperitoneum  On TPN  S/p NGT for bowel decompression; removed 3/6/21  -tolerating PO    Anemia: stable          Raj Alas MD.  3/10/2021  Office Phone : [phone]    Thank you for allowing us to participate in the care of this pt. I willcontinue to follow along. Please call with questions or concerns.

## 2021-03-11 ENCOUNTER — CARE COORDINATION (OUTPATIENT)
Dept: CASE MANAGEMENT | Age: 76
End: 2021-03-11

## 2021-03-11 DIAGNOSIS — M47.812 CERVICAL SPONDYLOSIS WITHOUT MYELOPATHY: ICD-10-CM

## 2021-03-11 DIAGNOSIS — K63.89 PNEUMATOSIS INTESTINALIS OF SMALL INTESTINE: Primary | ICD-10-CM

## 2021-03-11 DIAGNOSIS — M50.30 DDD (DEGENERATIVE DISC DISEASE), CERVICAL: ICD-10-CM

## 2021-03-11 DIAGNOSIS — M54.12 CERVICAL RADICULOPATHY: ICD-10-CM

## 2021-03-11 DIAGNOSIS — M48.02 FORAMINAL STENOSIS OF CERVICAL REGION: ICD-10-CM

## 2021-03-11 PROCEDURE — 1111F DSCHRG MED/CURRENT MED MERGE: CPT | Performed by: FAMILY MEDICINE

## 2021-03-11 NOTE — CARE COORDINATION
[facility] Transitions Initial Follow Up Call    Call within 2 business days of discharge: Yes    Patient: Zechariah Garcia Patient : 1945   MRN: [id number]  Reason for Admission:   Discharge Date: 3/10/21 RARS: Readmission Risk Score: 25      Last Discharge [facility]       Complaint Diagnosis Description Type Department Provider    21   Admission (Discharged) Laveta Gosselin, MD             Non-face-to-face services provided:  Obtained and reviewed discharge summary and/or continuity of care documents  [id number]   Challenges to be reviewed by the provider   Additional needs identified to be addressed with provider No  none             Method of communication with provider : none    Discussed COVID-19 related testing which was not done at this time. Test results were not done. Patient informed of results, if available? No    Advance Care Planning:   Does patient have an Advance Directive:  not on file; education provided. Was this a readmission? No  Patient stated reason for admission:   Patients top risk factors for readmission: medical condition    Care Transition Nurse (CTN) contacted the patient by telephone to perform post hospital discharge assessment. Verified name and  with patient as identifiers. Provided introduction to self, and explanation of the CTN role. CTN reviewed discharge instructions, medical action plan and red flags with patient who verbalized understanding. Patient given an opportunity to ask questions and does not have any further questions or concerns at this time. Were discharge instructions available to patient? Yes. Reviewed appropriate site of care based on symptoms and resources available to patient including: When to call 911. The patient agrees to contact the PCP office for questions related to their healthcare. Medication reconciliation was performed with patient, who verbalizes understanding of administration of home medications. Advised obtaining a 90-day supply of all daily and as-needed medications. Covid Risk Education    Patient has following risk factors of: heart failure, asthma and diabetes. Education provided regarding infection prevention, and signs and symptoms of COVID-19 and when to seek medical attention with patient who verbalized understanding. Discussed exposure protocols and quarantine From CDC: Are you at higher risk for severe illness?   and given an opportunity for questions and concerns. The patient agrees to contact the COVID-19 hotline [phone] or PCP office for questions related to COVID-19. For more information on steps you can take to protect yourself, see CDC's How to Protect Yourself     Was patient discharged with a pulse oximeter? No Discussed and confirmed pulse oximeter discharge instructions and when to notify provider or seek emergency care. Discussed follow-up appointments. If no appointment was previously scheduled, appointment scheduling offered: Yes. Is follow up appointment scheduled within 7 days of discharge? Yes  Non-[facility] follow up appointment(s): no    Plan for follow-up call in 5-7 days based on severity of symptoms and risk factors. Plan for next call: self management-small bowel pneumatosis, CHF,f/u appts     Pt states doing well, no issues or concerns, just a little sore all over her body. Denies CP,SOB, abd pain. Weight today is 203 lbs. F/U appts listed below. Agreed to more CTC f/u calls. CTN provided contact information for future needs.       Care Transitions 24 Hour Call    Do you have any ongoing symptoms?: No  Do you have a copy of your discharge instructions?: Yes  Do you have all of your prescriptions and are they filled?: Yes  Have you been contacted by a [facility] Pharmacist?: No  Have you scheduled your follow up appointment?: Yes  How are you going to get to your appointment?: Car - family or friend to transport  Were you discharged with any Home Care or Post Acute Services: No  Patient DME: Shower chair, Straight cane, Walker  Do you have support at home?: Partner/Spouse/SO  Do you feel like you have everything you need to keep you well at home?: Yes  Are you an active caregiver in your home?: No  Care Transitions Interventions  No Identified Needs         Follow Up  Future Appointments   Date Time Provider Tawana Hannah   3/16/2021  9:30 AM [facility] [address], [address], APRN - CNS FF Cardio [facility]   3/22/2021  9:30 AM Kirill Hunter MD FF G[facility]L SURG [facility]   3/23/2021  9:00 AM Jes Carvalho MD [facility]   3/26/2021  7:30 AM Sarai Lopez MD FF Cardio [facility]   4/2/2021  7:30 AM Montez Davis MD FF Cardio [facility]   4/5/2021 11:00 AM SCHEDULE, [address] FF Cardio [facility]       Ismael Interiano RN

## 2021-03-11 NOTE — TELEPHONE ENCOUNTER
Medication  gabapentin (NEURONTIN) 300 MG capsule [38110]  gabapentin (NEURONTIN) 300 MG capsule [[id number]]    [facility] - [facility], [address] [place] [address] - [address] [place] [address]       Patients provider is out of office

## 2021-03-11 NOTE — TELEPHONE ENCOUNTER
Medication:   Requested Prescriptions     Pending Prescriptions Disp Refills    gabapentin (NEURONTIN) 300 MG capsule 90 capsule 0     Sig: Take 1 capsule by mouth nightly for 90 days. Intended supply: 30 days      Last Filled:  12/14/21    Patient Phone Number: [phone] (home)     Last appt: 2/12/2021   Next appt: 3/23/2021    Last OARRS:   RX Monitoring 3/1/2019   Attestation The Prescription Monitoring Report for this patient was reviewed today.    Periodic Controlled Substance Monitoring -     PDMP Monitoring:    Last PDMP Ramon Barrios as Reviewed [facility]):  Review User Review Instant Review Result   Doni Camilo 12/28/2020 11:26 AM Reviewed PDMP [1]     Preferred Pharmacy:   [facility] Johanna Shea, Joe [address] [phone] - F [phone]  [place] [address]  Phone: [phone] Fax: [phone]    [facility] [address], [address] [phone] Huong Ellis [phone]  [address]  [address] [place] [address]  Phone: [phone] Fax: [phone]

## 2021-03-12 ENCOUNTER — TELEPHONE (OUTPATIENT)
Dept: OTHER | Age: 76
End: 2021-03-12

## 2021-03-12 ENCOUNTER — TELEPHONE (OUTPATIENT)
Dept: FAMILY MEDICINE CLINIC | Age: 76
End: 2021-03-12

## 2021-03-12 RX ORDER — GABAPENTIN 300 MG/1
300 CAPSULE ORAL NIGHTLY
Qty: 90 CAPSULE | Refills: 0 | Status: SHIPPED | OUTPATIENT
Start: 2021-03-12 | End: 2021-05-14

## 2021-03-12 NOTE — TELEPHONE ENCOUNTER
Patients spouse called that she received her COVID vaccine 2/1 and the 2nd was scheduled 2/28 but she was in the hospital.   does not have the [facility].       The main question is if she will need to restart the vaccine and/or if there is a time limit on getting the second dose      Please advise and give him a callback

## 2021-03-12 NOTE — TELEPHONE ENCOUNTER
[facility] FAILURE PROGRAM  TELEPHONE ENCOUNTER FORM    Claudell Scotts 1945    ASSESSMENT:   1. Baseline weight: 202 lbs  2. Current weight: 198 lbs  3. Patient weighing daily: Yes  4. Symptoms: denies symptoms  5. Patient knows who to call with symptoms: Yes  6. Diet history:      Patient states sodium limitation is : 3000 mg      Patient states fluid limitation is 64 oz   Patient following diet as instructed: Yes  7. Medication history: taking medications as instructed Yes; medication side effects noted No  8. Patient is involved in exercise program: Yes  9. Patient knows date and time of follow up appointment: Yes  10. Patient has transportation to appointments: Yes    RECOMMENDATIONS:   1. Medication: taking as prescribed  2. Diet: no added salt   3. MD/ Clinic appointment: 3/16 with Scott Reynoso   4. Other:  Encouraged to call with any questions or concerns.

## 2021-03-12 NOTE — TELEPHONE ENCOUNTER
Guidelines for time of getting vaccines are just guidelines.   Certainly though if she had the Learta Sabot she needs to receive maderna for the second dose

## 2021-03-15 ENCOUNTER — ANTI-COAG VISIT (OUTPATIENT)
Dept: FAMILY MEDICINE CLINIC | Age: 76
End: 2021-03-15

## 2021-03-15 ENCOUNTER — TELEPHONE (OUTPATIENT)
Dept: FAMILY MEDICINE CLINIC | Age: 76
End: 2021-03-15

## 2021-03-15 LAB — INR BLD: 1.3

## 2021-03-15 NOTE — TELEPHONE ENCOUNTER
Patient has not been on Coumadin the entire time she was in the hospital. I would recommend that she take 7-1/2 mg of Coumadin daily for the next 3 days and recheck her pro time

## 2021-03-16 ENCOUNTER — HOSPITAL ENCOUNTER (OUTPATIENT)
Age: 76
Discharge: HOME | End: 2021-03-16
Payer: MEDICARE

## 2021-03-16 ENCOUNTER — OFFICE VISIT (OUTPATIENT)
Dept: CARDIOLOGY CLINIC | Age: 76
End: 2021-03-16
Payer: MEDICARE

## 2021-03-16 VITALS
BODY MASS INDEX: 31.98 KG/M2 | SYSTOLIC BLOOD PRESSURE: 112 MMHG | WEIGHT: 211 LBS | HEART RATE: 92 BPM | OXYGEN SATURATION: 96 % | HEIGHT: 68 IN | DIASTOLIC BLOOD PRESSURE: 64 MMHG

## 2021-03-16 DIAGNOSIS — Z95.1 S/P CABG X 3: ICD-10-CM

## 2021-03-16 DIAGNOSIS — Z79.4 TYPE 2 DIABETES MELLITUS WITH HYPERGLYCEMIA, WITH LONG-TERM CURRENT USE OF INSULIN (HCC): ICD-10-CM

## 2021-03-16 DIAGNOSIS — I50.23 ACUTE ON CHRONIC SYSTOLIC HEART FAILURE DUE TO VALVULAR DISEASE (HCC): ICD-10-CM

## 2021-03-16 DIAGNOSIS — I50.23 ACUTE ON CHRONIC SYSTOLIC HEART FAILURE DUE TO VALVULAR DISEASE (HCC): Primary | ICD-10-CM

## 2021-03-16 DIAGNOSIS — I38 ACUTE ON CHRONIC SYSTOLIC HEART FAILURE DUE TO VALVULAR DISEASE (HCC): Primary | ICD-10-CM

## 2021-03-16 DIAGNOSIS — I38 ACUTE ON CHRONIC SYSTOLIC HEART FAILURE DUE TO VALVULAR DISEASE (HCC): ICD-10-CM

## 2021-03-16 DIAGNOSIS — I35.0 NONRHEUMATIC AORTIC VALVE STENOSIS: ICD-10-CM

## 2021-03-16 DIAGNOSIS — E11.65 TYPE 2 DIABETES MELLITUS WITH HYPERGLYCEMIA, WITH LONG-TERM CURRENT USE OF INSULIN (HCC): ICD-10-CM

## 2021-03-16 DIAGNOSIS — I82.90 DEEP VEIN THROMBOSIS (DVT) OF NON-EXTREMITY VEIN, UNSPECIFIED CHRONICITY: ICD-10-CM

## 2021-03-16 DIAGNOSIS — R06.02 SHORTNESS OF BREATH: ICD-10-CM

## 2021-03-16 DIAGNOSIS — I48.0 PAF (PAROXYSMAL ATRIAL FIBRILLATION) (HCC): ICD-10-CM

## 2021-03-16 LAB
ANION GAP SERPL CALCULATED.3IONS-SCNC: 10 MMOL/L (ref 3–16)
BUN BLDV-MCNC: 19 MG/DL (ref 7–20)
CALCIUM SERPL-MCNC: 8.2 MG/DL (ref 8.3–10.6)
CHLORIDE BLD-SCNC: 103 MMOL/L (ref 99–110)
CO2: 26 MMOL/L (ref 21–32)
CREAT SERPL-MCNC: 1.1 MG/DL (ref 0.6–1.2)
GFR AFRICAN AMERICAN: 59
GFR NON-AFRICAN AMERICAN: 49
GLUCOSE BLD-MCNC: 146 MG/DL (ref 70–99)
POTASSIUM SERPL-SCNC: 4 MMOL/L (ref 3.5–5.1)
PRO-BNP: 4312 PG/ML (ref 0–449)
SODIUM BLD-SCNC: 139 MMOL/L (ref 136–145)

## 2021-03-16 PROCEDURE — 99496 TRANSJ CARE MGMT HIGH F2F 7D: CPT | Performed by: CLINICAL NURSE SPECIALIST

## 2021-03-16 PROCEDURE — 83880 ASSAY OF NATRIURETIC PEPTIDE: CPT

## 2021-03-16 PROCEDURE — 80048 BASIC METABOLIC PNL TOTAL CA: CPT

## 2021-03-16 PROCEDURE — 36415 COLL VENOUS BLD VENIPUNCTURE: CPT

## 2021-03-16 PROCEDURE — 83036 HEMOGLOBIN GLYCOSYLATED A1C: CPT

## 2021-03-16 RX ORDER — TORSEMIDE 20 MG/1
TABLET ORAL
Qty: 120 TABLET | Refills: 0 | Status: SHIPPED | OUTPATIENT
Start: 2021-03-16 | End: 2021-04-12 | Stop reason: SDUPTHER

## 2021-03-16 RX ORDER — SPIRONOLACTONE 25 MG/1
50 TABLET ORAL DAILY
Qty: 30 TABLET | Refills: 0
Start: 2021-03-16 | End: 2021-04-12 | Stop reason: SDUPTHER

## 2021-03-16 NOTE — PROGRESS NOTES
[address]  Progress Note    Primary Care Doctor:  Lissette Mendez MD    Chief Complaint   Patient presents with    Follow-up     cant walk due to sob    Congestive Heart Failure    Shortness of Breath        History of Present Illness:  77 y.o. female with history of CAD/CABG in 2018, PAF with maze 2018, HTN, HLD, DVT/PE on coumadin, 2 CVs unsuccessful    I had the pleasure of seeing Samm Myers in follow up for hospitalization -3/10/21 for small bowel pneumatosis with loculated pneumoperitoneum (IV zoysn and TPN), acute on chronic sHF (torsemide decreased at discharge, aldactone was held and coreg dose decreased due to hypotension), TORIBIO on CKD, PAF. She is in a wheel chair with Arturo Bourgeois (). At discharge her weight was 202 (home 198) and now up to 211. Her torsemide dose is 20 mg (home 100 mg daily). Her lower ankles are starting to have edema. She complains of sob. She denies any chest pain, palpitations or lightheadedness. BP at home has been >110. She has follow up with Dr Oscar Flores    Past Medical History:   has a past medical history of Asthma, Atrial fibrillation ([address] Eosinophilia, Hemoptysis, HIGH CHOLESTEROL, Hx of blood clots, Hypertension, Irregular heart beat, Other specified gastritis without mention of hemorrhage, Palpitations, Skin cancer, and Type II or unspecified type diabetes mellitus without mention of complication, not stated as uncontrolled. Surgical History:   has a past surgical history that includes Cholecystectomy;  section; Colonoscopy (2017); skin biopsy; bronchoscopy (2016); Coronary artery bypass graft (2018); Mitral valve replacement (2018); transesophageal echocardiogram (2018); Tunneled venous catheter placement (Left, 2018); Cardiac catheterization (2018); Insertable Cardiac Monitor (Left, 2018); Colonoscopy (2014); Hysterectomy;  Upper gastrointestinal endoscopy (N/A, 10/10/2018); chloride (KLOR-CON M) 10 MEQ extended release tablet TAKE 1 TABLET DAILY 9/23/20  Yes Stoney Cruz MD   albuterol (PROVENTIL) (2.5 MG/3ML) 0.083% nebulizer solution Take 3 mLs by nebulization every 6 hours as needed for Wheezing 6/2/20  Yes Stoney Cruz MD   polyethylene glycol (GLYCOLAX) 17 GM/SCOOP powder Take 17 g by mouth every other day    Yes Historical Provider, MD   omeprazole (PRILOSEC) 20 MG delayed release capsule Take 20 mg by mouth daily   Yes Historical Provider, MD Davisyle Lancets MISC 1 each by Does not apply route 4 times daily 4/29/20  Yes Stoney Cruz MD   ondansetron (ZOFRAN) 4 MG tablet Take 1 tablet by mouth 3 times daily as needed for Nausea or Vomiting 3/26/20  Yes Stoney Cruz MD   warfarin (COUMADIN) 5 MG tablet TAKE 1 TABLET DAILY  Patient taking differently: Take 5 mg by mouth daily Managed by PCP 2/26/20  Yes Wilmar Hussein MD   montelukast (SINGULAIR) 10 MG tablet TAKE 1 TABLET NIGHTLY 2/26/20  Yes Wilmar Hussein MD   Blood Glucose Monitoring Suppl (EMBRACE PRO GLUCOSE METER) GAETANO 1 Device by Does not apply route 4 times daily 2/4/20  Yes Stoney Cruz MD   HUMALOG KWIKPEN 100 UNIT/ML SOPN TAKE AS INSTRUCTED BY YOUR PRESCRIBER  Patient taking differently:  Only if high blood sugar-has not been using 12/30/19  Yes Stoney Cruz MD   nystatin (MYCOSTATIN) 082813 UNIT/ML suspension TAKE ONE TEASPOONFUL BY MOUTH FOUR TIMES A DAY FOR 5 DAYS 11/20/19  Yes Stoeny Cruz MD   aspirin 81 MG chewable tablet Take 1 tablet by mouth daily 6/7/19  Yes Stoney Cruz MD   BD PEN NEEDLE JANNET U/F 32G X 4 MM MISC USE 1 PEN NEEDLE FOUR TIMES A DAY 3/22/19  Yes Stoney Cruz MD   vitamin B-12 500 MCG tablet Take 1 tablet by mouth daily 10/12/18  Yes Gilberto Angelucci, MD        Allergies:  Ajzmatmp-yjeyclt-zvygxw [fluocinolone], Ciprofloxacin, Diovan [valsartan], Flagyl [metronidazole], Metformin and related [metformin and related], Benazepril, Morphine, Saxagliptin, and Levaquin [levofloxacin]     Review of Systems:   · Constitutional: there has been no unanticipated weight loss. There's been no change in energy level, sleep pattern, or activity level. · Eyes: No visual changes or diplopia. No scleral icterus. · ENT: No Headaches, hearing loss or vertigo. No mouth sores or sore throat. · Cardiovascular: Reviewed in HPI  · Respiratory: No cough or wheezing, no sputum production. No hematemesis. · Gastrointestinal: No abdominal pain, appetite loss, blood in stools. No change in bowel or bladder habits. · Genitourinary: No dysuria, trouble voiding, or hematuria. · Musculoskeletal:  No gait disturbance, weakness or joint complaints. · Integumentary: No rash or pruritis. · Neurological: No headache, diplopia, change in muscle strength, numbness or tingling. No change in gait, balance, coordination, mood, affect, memory, mentation, behavior. · Psychiatric: No anxiety, no depression. · Endocrine: No malaise, fatigue or temperature intolerance. No excessive thirst, fluid intake, or urination. No tremor. · Hematologic/Lymphatic: No abnormal bruising or bleeding, blood clots or swollen lymph nodes. · Allergic/Immunologic: No nasal congestion or hives.     Physical Examination:    Vitals:    03/16/21 0920   BP: 112/64   Site: Right Upper Arm   Position: Sitting   Cuff Size: Large Adult   Pulse: 92   SpO2: 96%   Weight: 211 lb (95.7 kg)   Height: 5' 8\" (1.727 m)        Constitutional and General Appearance: Warm and dry, no apparent distress, normal coloration  HEENT:  Normocephalic, atraumatic  Respiratory:  · Normal excursion and expansion without use of accessory muscles  · Resp Auscultation: Normal breath sounds without dullness  Cardiovascular:  · The apical impulses not displaced  · Heart tones are crisp and normal  · JVP normal cm H2O  · irregular rate and rhythm  · Peripheral pulses are symmetrical and full  · There is no clubbing, cyanosis of 20-25%.   There is no improvement in wall motion with low or high dose dobutamine   suggestive of nonviable myocardium.      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 After dobutamine stress the mean AV   gradient rises to 20mmHg with 20mcg of dobutamine consistent with mild to   moderate aortic stenosis. Prosthetic mitral valve with a mean gradient of 7mmHg        JUDE 10/21/2020  Mildly dilated left ventricular size and normal wall thickness. Global left ventricular function is severely decreased with ejection fraction estimated at 25%. Patient is in atrial fibrillation during procedure.      The bioprosthetic mitral valve is well seated with a mean gradient of 5mmHg and maximum pressure gradient of 15 mmHg with heart rate of 89 bpm. Pressure half time of 82 ms. There is trivial mitral regurgitation.      Left atrial enlargement. Spontaneous echo contrast seen in the left atrium. A large stump of the left atrial appendage with no thrombus noted. Patient has history of surgical ligation of the left atrial appendage. There are spontaneous echo contrast in the atrial appendage.      The aortic valve is thickened/calcified with decreased leaflet mobility consistent with aortic stenosis. There is trivial aortic insufficiency.      There is moderate tricuspid regurgitation.     ECHO 9/15/20  Left ventricular cavity size is dilated. There is normal wall thickness with a moderately severe reduction in   systolic function.   LV ejection fraction is visually estimated to be 25-30%.   Indeterminate diastolic function.   The right ventricle is not well visualized but appears to be normal in size with moderately reduced function.   The left atrium is moderately dilated.   A bioprosthetic mitral valve with a mean gradient of 7 mmHg.  This may be a normal gradient for this valve but could suggest mild stenosis.   Trivial mitral regurgitation.   The aortic valve is

## 2021-03-16 NOTE — PATIENT INSTRUCTIONS
1.  Increase torsemide to 40 mg twice a day  2. Check blood work today  3. Restart aldactone 50 mg once a day  4. Continue all other medications  5. Ok to take miralax twice a day  6. Keep log of weight and call if not down to 200 by the end of the week  7. Log BP call if continues >110   8.   RTO April 1 st with Dr Jose M Mccollum

## 2021-03-17 ENCOUNTER — TELEPHONE (OUTPATIENT)
Dept: CARDIOLOGY CLINIC | Age: 76
End: 2021-03-17

## 2021-03-17 DIAGNOSIS — R06.02 SHORTNESS OF BREATH: ICD-10-CM

## 2021-03-17 DIAGNOSIS — I50.23 ACUTE ON CHRONIC SYSTOLIC HEART FAILURE DUE TO VALVULAR DISEASE (HCC): Primary | ICD-10-CM

## 2021-03-17 DIAGNOSIS — Z95.1 S/P CABG X 3: ICD-10-CM

## 2021-03-17 DIAGNOSIS — I35.0 NONRHEUMATIC AORTIC VALVE STENOSIS: ICD-10-CM

## 2021-03-17 DIAGNOSIS — I38 ACUTE ON CHRONIC SYSTOLIC HEART FAILURE DUE TO VALVULAR DISEASE (HCC): Primary | ICD-10-CM

## 2021-03-17 DIAGNOSIS — I48.0 PAF (PAROXYSMAL ATRIAL FIBRILLATION) (HCC): ICD-10-CM

## 2021-03-17 LAB
ESTIMATED AVERAGE GLUCOSE: 142.7 MG/DL
HBA1C MFR BLD: 6.6 %

## 2021-03-17 NOTE — TELEPHONE ENCOUNTER
----- Message from SOLEDAD Jacobs - CNS sent at 3/17/2021  8:44 AM EDT -----  Her fluid level was elevated on labs yesterday   Confirm that she increased her torsemide as discussed in OV yesterday  A1c was 6.6 which is good  Recheck labs again in 2 weeks like we discussed  thanks

## 2021-03-17 NOTE — TELEPHONE ENCOUNTER
She confirmed correct Torsemide dose 40mg BID and spironolactone dose. Wt today 209. She will call the office on Friday if not below 200 lbs (per OV note)    She has not been unable to get a BP reading today. She will retry. Advised to try palm up, arm extended on table. Lab orders entered.

## 2021-03-18 ENCOUNTER — CARE COORDINATION (OUTPATIENT)
Dept: CASE MANAGEMENT | Age: 76
End: 2021-03-18

## 2021-03-18 ENCOUNTER — TELEPHONE (OUTPATIENT)
Dept: CARDIOLOGY CLINIC | Age: 76
End: 2021-03-18

## 2021-03-18 NOTE — TELEPHONE ENCOUNTER
Pt  calling was told to call if pts BP goes below 110, today it was 107/68. Needs to know what to do?  Pls call to advise Thank you

## 2021-03-18 NOTE — TELEPHONE ENCOUNTER
Lets jump start this increased fluid by 1 of her metolazone pill tomorrow 30 minutes before her torsemide

## 2021-03-18 NOTE — TELEPHONE ENCOUNTER
Spoke to patient and her BP was taken before medications at about 7 am. BP was fine yersterday   Please advise    Instructions       Return ok to check out, she has follow up with zahira in april. 1.  Increase torsemide to 40 mg twice a day  2. Check blood work today  3. Restart aldactone 50 mg once a day  4. Continue all other medications  5. Ok to take miralax twice a day  6. Keep log of weight and call if not down to 200 by the end of the week  7. Log BP call if continues >110   8.   RTO April 1 st with Dr Elza Duarte

## 2021-03-19 ENCOUNTER — CARE COORDINATION (OUTPATIENT)
Dept: CASE MANAGEMENT | Age: 76
End: 2021-03-19

## 2021-03-22 ENCOUNTER — OFFICE VISIT (OUTPATIENT)
Dept: SURGERY | Age: 76
End: 2021-03-22
Payer: MEDICARE

## 2021-03-22 VITALS
SYSTOLIC BLOOD PRESSURE: 112 MMHG | BODY MASS INDEX: 29.5 KG/M2 | TEMPERATURE: 97.2 F | WEIGHT: 194 LBS | DIASTOLIC BLOOD PRESSURE: 68 MMHG

## 2021-03-22 DIAGNOSIS — R10.13 EPIGASTRIC PAIN: Primary | ICD-10-CM

## 2021-03-22 PROCEDURE — 99213 OFFICE O/P EST LOW 20 MIN: CPT | Performed by: SURGERY

## 2021-03-22 NOTE — PATIENT INSTRUCTIONS
1.  Concerned about patient's increased abdominal pain and distention. Does not feel ill enough to be in the hospital but counseled that should this worsen, she will need to be in the emergency department for evaluation and possible admission  2. Recommend protein shakes and counseled the importance of nutrition  3. Recommend drinking 8 glasses of water per day to maintain hydration. If unable to maintain hydration or nutrition, will need emergency department evaluation and likely admission  4.   Return to office in 2 weeks at latest to discuss progress and further options

## 2021-03-22 NOTE — PROGRESS NOTES
[facility]  SUBJECTIVE:    Chief Complaint: abdominal pain    Jp Number   [id number]   77 y.o. female presents for followup after recent hospital admission and evaluation 3/2/21. She presented for abdominal swelling. 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 CT initially showed pneumatosis of small bowel and loculated pneumoperitoneum likely from pneumatosis. The pneumatosis has been present on prior imaging. On serial imaging this did improve, and when she left the hospital she was feeling well and tolerating diet. Currently she has some intermittent nausea early satiety and weight loss as well as back pain shoulder pain mild abdominal distention and aching.   Otherwise no major symptoms and patient does not want to return to hospital    Past Medical History:   Diagnosis Date    Asthma     Atrial fibrillation (HCC)     Eosinophilia     Hemoptysis     HIGH CHOLESTEROL     Hx of blood clots     Hypertension     Irregular heart beat     Other specified gastritis without mention of hemorrhage     Palpitations     Skin cancer     basal and squamous    Type II or unspecified type diabetes mellitus without mention of complication, not stated as uncontrolled      Past Surgical History:   Procedure Laterality Date    BRONCHOSCOPY  07/18/2016    Dr. Tash Ascencio - brushings from [address] [place] [address]  08/16/2018    Dr. Laura Ramos  02/07/2017    Dr. Martinez So - sigmoid diverticulosis, polypectomies x3    COLONOSCOPY  01/17/2014    Dr. Juan Hernandez - sigmoid diverticulosis, polypectomies x3, internal hemorrhoids    COLONOSCOPY  10/10/2018    w/biopsy performed by Ariadne Mendoza MD at [address] [facility] N/A 8/7/2020    COLONOSCOPY DIAGNOSTIC performed by Tash Ascencio MD at [address] [facility] CORONARY ARTERY BYPASS GRAFT  08/21/2018    Dr. Amilcar Zhou - x3 (LIMA-LAD, L SV-D1-PLV) modified BL MAZE procedure w/obliteration of WAYNE using 45mm AtriClip    HYSTERECTOMY      INSERTABLE CARDIAC MONITOR Left 08/16/2018    Dr. Jesi Willis - Aleah Crowder # [id number] [employer]    MITRAL VALVE REPLACEMENT  08/21/2018    Dr. Amilcar Zhou - 27mm Medtronic Cinch tissue valve    PAIN MANAGEMENT PROCEDURE N/A 12/18/2020    C6-C7 MIDLINE  EPIDURAL STEROID INJECTION WITH FLUOROSCOPY performed by Luana Matthews MD at [address] Bilateral 1/18/2021    BILATERAL T11 TRANSFORAMINAL EPIDURAL STEROID INJECTION WITH FLUOROSCOPY performed by Luana Matthews MD at [address] TRANSESOPHAGEAL ECHOCARDIOGRAM  08/21/2018    during CABG/MVR    TUNNELED VENOUS CATHETER PLACEMENT Left 08/23/2018    Dr. Machelle Aldana - IJ for HD---since removed    UPPER GASTROINTESTINAL ENDOSCOPY N/A 10/10/2018    w/biopsy performed by Diandra Nicole MD at [address] Marital status:      Spouse name: Not on file    Number of children: Not on file    Years of education: Not on file    Highest education level: Not on file   Occupational History    Not on file   Social Needs    Financial resource strain: Not on file    Food insecurity     Worry: Not on file     Inability: Not on file    Transportation needs     Medical: Not on file     Non-medical: Not on file   Tobacco Use    Smoking status: Never Smoker    Smokeless tobacco: Never Used   Substance and Sexual Activity    Alcohol use: No    Drug use: No    Sexual activity: Not on file   Lifestyle    Physical activity     Days per week: Not on file     Minutes per session: Not on file    Stress: Not on file   Relationships    Social connections     Talks on phone: Not on file     Gets together: Not on file     Attends [employer] service: Not on file     Active member of club or organization: Not on file     Attends meetings of clubs or organizations: Not on file     Relationship status: Not on file    Intimate partner violence     Fear of current or ex partner: Not on file     Emotionally abused: Not on file     Physically abused: Not on file     Forced sexual activity: Not on file   Other Topics Concern    Not on file   Social History Narrative    Not on file      Family History   Problem Relation Age of Onset    Cancer Father     Asthma Mother     Hypertension Mother     Heart Disease Mother     High Blood Pressure Mother      Current Outpatient Medications   Medication Sig Dispense Refill    torsemide (DEMADEX) 20 MG tablet 40 mg twice a day 120 tablet 0    spironolactone (ALDACTONE) 25 MG tablet Take 2 tablets by mouth daily 30 tablet 0    gabapentin (NEURONTIN) 300 MG capsule Take 1 capsule by mouth nightly for 90 days. Intended supply: 30 days 90 capsule 0    insulin glargine (LANTUS SOLOSTAR) 100 UNIT/ML injection pen INJECT 26 UNITS IN THE MORNING AND 18 UNITS AT BEDTIME 90 mL 1    carvedilol (COREG) 3.125 MG tablet Take 1 tablet by mouth 2 times daily (with meals) 60 tablet 0    exenatide (BYETTA 10 MCG PEN) 10 MCG/0.04ML injection Inject 0.04 mLs into the skin 2 times daily (with meals) 3 pen 1    OXYCODONE HCL PO Take 10 mg by mouth As of 1/21/2021,  states patient is taking 10mg with edge being taken off her pain after 3 hours.       ACETAMINOPHEN PO Take 500 mg by mouth every 6 hours as needed       blood glucose test strips (FREESTYLE LITE) strip USE TO TEST FOUR TIMES A  strip 4    albuterol sulfate  (90 Base) MCG/ACT inhaler USE 2 INHALATIONS EVERY 6 HOURS AS NEEDED FOR WHEEZING 25.5 g 2    potassium chloride (KLOR-CON M) 10 MEQ extended release tablet TAKE 1 TABLET DAILY 90 tablet 1    albuterol (PROVENTIL) (2.5 MG/3ML) 0.083% nebulizer solution Take 3 mLs by nebulization every 6 hours as needed for Wheezing 120 each 3    polyethylene glycol (GLYCOLAX) 17 GM/SCOOP powder Take 17 g by mouth every other day       omeprazole (PRILOSEC) 20 MG delayed release capsule Take 20 mg by mouth daily      FreeStyle Lancets MISC 1 each by Does not apply route 4 times daily 200 each 5    ondansetron (ZOFRAN) 4 MG tablet Take 1 tablet by mouth 3 times daily as needed for Nausea or Vomiting 30 tablet 0    warfarin (COUMADIN) 5 MG tablet TAKE 1 TABLET DAILY (Patient taking differently: Take 5 mg by mouth daily Managed by PCP) 100 tablet 4    montelukast (SINGULAIR) 10 MG tablet TAKE 1 TABLET NIGHTLY 90 tablet 4    Blood Glucose Monitoring Suppl (EMBRACE PRO GLUCOSE METER) GAETANO 1 Device by Does not apply route 4 times daily 1 Device 0    HUMALOG KWIKPEN 100 UNIT/ML SOPN TAKE AS INSTRUCTED BY YOUR PRESCRIBER (Patient taking differently: Only if high blood sugar-has not been using) 15 mL 8    nystatin (MYCOSTATIN) 256636 UNIT/ML suspension TAKE ONE TEASPOONFUL BY MOUTH FOUR TIMES A DAY FOR 5 DAYS 1 Bottle 2    aspirin 81 MG chewable tablet Take 1 tablet by mouth daily 30 tablet 3    BD PEN NEEDLE JANNET U/F 32G X 4 MM MISC USE 1 PEN NEEDLE FOUR TIMES A  each 3    vitamin B-12 500 MCG tablet Take 1 tablet by mouth daily 30 tablet 3     No current facility-administered medications for this visit. Allergies   Allergen Reactions    Rpshbcma-Apdqjck-Fwqgzy [Fluocinolone] Shortness Of Breath    Ciprofloxacin Shortness Of Breath    Diovan [Valsartan] Shortness Of Breath    Flagyl [Metronidazole] Shortness Of Breath     Has taken diflucan at home 12/7/15    Metformin And Related [Metformin And Related] Shortness Of Breath    Benazepril      Other reaction(s): Not Recorded    Morphine      Bad reaction. \"makes her feel horrible\".      Saxagliptin      Other reaction(s): Not Recorded    Levaquin [Levofloxacin] Rash        Review of Systems:  Review of systems performed and negative with the exception of the above findings    OBJECTIVE:  /68   Temp 97.2 °F (36.2 °C) (Infrared)   Wt 194 lb (88 kg)   BMI 29.50 kg/m²      Physical Exam:  General appearance: alert, appears stated age, cooperative and no distress  Head: Normocephalic, without obvious abnormality, atraumatic  Lungs: clear to auscultation bilaterally  Heart: regular rate and rhythm, S1, S2 normal, no murmur, click, rub or gallop  Abdomen: Soft, mild upper abdominal tenderness to palpation only without peritonitis, mild distention    Hospital Outpatient Visit on 03/16/2021   Component Date Value Ref Range Status    Sodium 03/16/2021 139  136 - 145 mmol/L Final    Potassium 03/16/2021 4.0  3.5 - 5.1 mmol/L Final    Chloride 03/16/2021 103  99 - 110 mmol/L Final    CO2 03/16/2021 26  21 - 32 mmol/L Final    Anion Gap 03/16/2021 10  3 - 16 Final    Glucose 03/16/2021 146* 70 - 99 mg/dL Final    BUN 03/16/2021 19  7 - 20 mg/dL Final    CREATININE 03/16/2021 1.1  0.6 - 1.2 mg/dL Final    GFR Non- 03/16/2021 49* >60 Final    Comment: >60 mL/min/1.73m2 EGFR, calc. for ages 25 and older using the  MDRD formula (not corrected for weight), is valid for stable  renal function.  GFR  03/16/2021 59* >60 Final    Comment: Chronic Kidney Disease: less than 60 ml/min/1.73 sq.m. Kidney Failure: less than 15 ml/min/1.73 sq.m. Results valid for patients 18 years and older.  Calcium 03/16/2021 8.2* 8.3 - 10.6 mg/dL Final    Pro-BNP 03/16/2021 4,312* 0 - 449 pg/mL Final    Comment: Methodology by NT-proBNP    An age-independent cutoff point of 300 pg/ml has a 98%  negative predictive value excluding acute heart failure. Values exceeding the age-related cutoff values (450 pg/mL if  age<50, 900 if 50-75 and 1800 if >75) has 90% sensitivity and  84% specificity for diagnosing acute HF. In patients with  renal compromise (eGFR<60) values greater than 1200pg/ml have  a diagnostic sensitivity and specificity of 89% and 72% for  acute HF.       Hemoglobin A1C 03/16/2021 6.6  See comment % Final    Comment: Comment:  Diagnosis of Diabetes: > or = 6.5%  Increased risk of diabetes (Prediabetes): 5.7-6.4%  Glycemic Control: Nonpregnant Adults: <7.0%                    Pregnant: <6.0%        eAG 03/16/2021 142.7  mg/dL Final   Anti-coag visit on 03/15/2021   Component Date Value Ref Range Status    INR 03/15/2021 1.30   Final   No results displayed because visit has over 200 results. Anti-coag visit on 02/22/2021   Component Date Value Ref Range Status    INR 02/22/2021 2.80   Final   Anti-coag visit on 02/15/2021   Component Date Value Ref Range Status    INR 02/15/2021 4.00   Final   Office Visit on 02/12/2021   Component Date Value Ref Range Status    Color, UA 02/12/2021 Yellow   Final    Clarity, UA 02/12/2021 Cloudy* Clear Final    Glucose, Ur 02/12/2021 neg   Final    Bilirubin Urine 02/12/2021 0  mg/dL Final    Ketones, Urine 02/12/2021 Positive   Final    trace    Specific Gravity, UA 02/12/2021 1.025  1.005 - 1.030 Final    Blood, Urine 02/12/2021 Negative   Final    pH, UA 02/12/2021 5.5  4.5 - 8.0 Final    Protein, UA 02/12/2021 Negative  Negative Final    Nitrite, Urine 02/12/2021 Negative   Final    Leukocytes, UA 02/12/2021 small   Final    Urobilinogen, Urine 02/12/2021 Normal   Final    rbc urine, poc 02/12/2021 neg   Final    wbc urine, poc 02/12/2021 1-3   Final    bacteria urine, poc 02/12/2021 trace   Final    yeast urine, poc 02/12/2021 neg   Final    casts urine, poc 02/12/2021 neg   Final    epi cells urine, poc 02/12/2021 neg   Final    crystals urine, poc 02/12/2021 neg   Final    Urine Culture, Routine 02/12/2021 <50,000 CFU/ml mixed skin/urogenital art.  No further workup   Final   Anti-coag visit on 02/09/2021   Component Date Value Ref Range Status    INR 02/09/2021 2.60   Final       Ct Abdomen Pelvis Wo Contrast Additional Contrast? Oral    Result Date: 3/4/2021  EXAMINATION: CT OF THE ABDOMEN AND PELVIS WITHOUT CONTRAST 3/4/2021 2:58 pm TECHNIQUE: CT of the abdomen and pelvis was performed without the administration of intravenous contrast. Multiplanar reformatted images are provided for review. Dose modulation, iterative reconstruction, and/or weight based adjustment of the mA/kV was utilized to reduce the radiation dose to as low as reasonably achievable. COMPARISON: None. HISTORY: ORDERING SYSTEM PROVIDED HISTORY: pneumatosis TECHNOLOGIST PROVIDED HISTORY: Additional Contrast?->Oral Reason for exam:->pneumatosis Reason for exam:->water soluble contrast through NG Reason for Exam: pneumatosis; water soluble contrast through NG Acuity: Unknown Type of Exam: Unknown FINDINGS: There is overall decrease in pneumoperitoneum and small bowel pneumatosis There is no free fluid. The solid organs demonstrate no acute abnormality. Overall decrease in pneumatosis and pneumoperitoneum. Ct Abdomen Pelvis Wo Contrast Additional Contrast? Radiologist Recommendation    Result Date: 3/2/2021  EXAMINATION: CT OF THE ABDOMEN AND PELVIS WITHOUT CONTRAST 3/2/2021 1:42 am TECHNIQUE: CT of the abdomen and pelvis was performed without the administration of intravenous contrast. Multiplanar reformatted images are provided for review. Dose modulation, iterative reconstruction, and/or weight based adjustment of the mA/kV was utilized to reduce the radiation dose to as low as reasonably achievable.  COMPARISON: 05/13/2020 HISTORY: ORDERING SYSTEM PROVIDED HISTORY: evaluate for pneumoperitoneum, nausea, abdominal distention,  hx pneumatosis, TECHNOLOGIST PROVIDED HISTORY: Reason for exam:->evaluate for pneumoperitoneum, nausea, abdominal distention,  hx pneumatosis, Additional Contrast?->Radiologist Recommendation Reason for Exam: evaluate for pneumoperitoneum, nausea, abdominal distention, hx pneumatosis, Acuity: Acute Type of Exam: Initial Relevant Medical/Surgical History: evaluate for pneumoperitoneum, nausea, abdominal distention,  hx pneumatosis, known pneumatosis this patient has previously been shown to have. CT scan of the abdomen and pelvis is recommended for further evaluation. Large volume of stool throughout the colon. Critical results were called by Dr. Eva Boyce MD to [facility] on 3/2/2021 at 00:26. Us Renal Complete    Result Date: 3/1/2021  EXAMINATION: RETROPERITONEAL ULTRASOUND OF THE KIDNEYS AND URINARY BLADDER 3/1/2021 COMPARISON: None HISTORY: ORDERING SYSTEM PROVIDED HISTORY: dr navarro TECHNOLOGIST PROVIDED HISTORY: Reason for exam:->dr navarro FINDINGS: Kidneys: The right kidney measures 10.0 x 6.6 x 5.4 cm in length and the left kidney measures 9.9 x 5.4 x 4.5 cm in length. Kidneys demonstrate normal cortical echogenicity. No evidence of hydronephrosis or intrarenal stones. Bladder: Bladder is incompletely distended and otherwise unremarkable in appearance. Unremarkable ultrasound of the kidneys and urinary bladder. Ct Abdomen Pelvis W Iv Contrast Additional Contrast? Oral    Result Date: 3/7/2021  EXAMINATION: CT OF THE ABDOMEN AND PELVIS WITH CONTRAST 3/7/2021 1:56 pm TECHNIQUE: CT of the abdomen and pelvis was performed with the administration of intravenous contrast. Multiplanar reformatted images are provided for review. Dose modulation, iterative reconstruction, and/or weight based adjustment of the mA/kV was utilized to reduce the radiation dose to as low as reasonably achievable. COMPARISON: 03/04/2021 and prior. HISTORY: ORDERING SYSTEM PROVIDED HISTORY: Re-eval pneumotosis and penumoperitoneum TECHNOLOGIST PROVIDED HISTORY: Reason for exam:->Re-eval pneumotosis and penumoperitoneum Additional Contrast?->Oral Acuity: Acute Type of Exam: Initial FINDINGS: Lower Chest: Scattered tree-in-bud opacities and more focal bandlike opacity at the right lung base appears similar to the prior study. Organs: The patient is status post cholecystectomy.   Mild dilation of the biliary collecting system again noted likely related to post cholecystectomy ectasia. The liver is otherwise grossly unremarkable in appearance. The spleen, adrenal glands, pancreas and kidneys are grossly unremarkable in appearance. GI/Bowel: The stomach demonstrates mild edematous wall thickening distally. No focal inflammatory change noted. There is extensive pneumatosis again noted. The findings are similar when accounting for differences in technique to the comparison. There is some redistribution from the comparison. There is increased air tracking along the sigmoid colon compared to the previous exam. There is oral contrast extending all the way to the rectum. The appendix is visualized and appears normal. No extravasation of oral contrast or significant fluid collections identified. Pelvis: Large left adnexal cyst measuring 8.5 x 6.4 cm again noted. The urinary bladder, uterus and right ovary are grossly unremarkable. Peritoneum/Retroperitoneum: The aorta is normal in caliber. No suspicious retroperitoneal adenopathy is noted. Bones/Soft Tissues: No acute osseous abnormality noted. Extensive pneumatosis or pneumoperitoneum is again noted. The findings are similar to the comparison with redistribution compared to the prior study. No obvious evidence of perforation noted. Mild wall thickening of the distal stomach suggesting possible peptic ulcer disease. Large cystic lesion within the left adnexa again noted, stable. MRI of the pelvis is recommended if this is not surgically evaluated. Xr Chest Portable    Result Date: 3/2/2021  EXAMINATION: ONE XRAY VIEW OF THE CHEST 3/2/2021 11:38 am COMPARISON: None. HISTORY: ORDERING SYSTEM PROVIDED HISTORY: confirm placement of PICC TECHNOLOGIST PROVIDED HISTORY: Reason for exam:->confirm placement of PICC Reason for Exam: confirm placement of PICC Acuity: Acute Type of Exam: Initial FINDINGS: The lungs are clear. The mediastinum and cardiac silhouette are unremarkable.  There is a right-sided PICC line in place, the tip projects over the cavoatrial junction. No acute abnormality. Xr Chest Portable    Result Date: 2/26/2021  EXAMINATION: ONE XRAY VIEW OF THE CHEST 2/26/2021 2:56 pm COMPARISON: 09/15/2020 HISTORY: ORDERING SYSTEM PROVIDED HISTORY: dyspnea, hf TECHNOLOGIST PROVIDED HISTORY: Reason for exam:->dyspnea, hf Reason for Exam: dyspnea, hf Acuity: Unknown Type of Exam: Ongoing FINDINGS: Monitor wires project over the chest.  Status post median sternotomy and CABG. Loop recorder projects over the heart. Heart size is enlarged, stable. Pulmonary vascularity is normal.  No focal consolidation, pleural effusion, or pneumothorax. Stable chest.  No acute cardiopulmonary abnormality. Stable mild cardiomegaly. Xr Abdomen For Ng/og/ne Tube Placement    Result Date: 3/2/2021  EXAMINATION: ONE SUPINE XRAY VIEW(S) OF THE ABDOMEN 3/2/2021 1:49 pm COMPARISON: March 1, 2021 HISTORY: ORDERING SYSTEM PROVIDED HISTORY: Confirm tip placement of NG tube TECHNOLOGIST PROVIDED HISTORY: Reason for exam:->Confirm tip placement of NG tube Portable? ->Yes Reason for Exam: NG tube placement Acuity: Unknown Type of Exam: Unknown FINDINGS: Median sternotomy. Left atrial Appendage clip is noted. Patient has had a valvuloplasty. The cardiac silhouette is mildly enlarged. There is a small left pleural effusion and left base opacity. Right PICC catheter tip overlies the right atrium. Enteric catheter tip overlies the body of the stomach. Side hole is distal to the GE junction. Bowel gas pattern is unremarkable. Enteric catheter tip overlies the body of the stomach. Side hole is distal to the GE junction. ASSESSMENT:  Recent Small bowel pneumatosis with loculated pneumoperitoneum  CHF  CAD  Atrial fibrillation  History of DVT/PE  Medical coagulopathy, on coumadin    PLAN:  1. Concerned about patient's increased abdominal pain and distention.   Does not feel ill enough to be in the hospital but counseled that should this worsen, she will need to be in the emergency department for evaluation and possible admission  2. Recommend protein shakes and counseled the importance of nutrition  3. Recommend drinking 8 glasses of water per day to maintain hydration. If unable to maintain hydration or nutrition, will need emergency department evaluation and likely admission  4. Return to office in 2 weeks at latest to discuss progress and further options    Tushar Parsons MD, FACS  3/22/2021  9:56 AM

## 2021-03-23 ENCOUNTER — OFFICE VISIT (OUTPATIENT)
Dept: FAMILY MEDICINE CLINIC | Age: 76
End: 2021-03-23
Payer: MEDICARE

## 2021-03-23 VITALS
WEIGHT: 196.25 LBS | RESPIRATION RATE: 16 BRPM | BODY MASS INDEX: 29.84 KG/M2 | DIASTOLIC BLOOD PRESSURE: 70 MMHG | HEART RATE: 119 BPM | SYSTOLIC BLOOD PRESSURE: 102 MMHG | OXYGEN SATURATION: 95 % | TEMPERATURE: 96.8 F

## 2021-03-23 DIAGNOSIS — M17.0 PRIMARY OSTEOARTHRITIS OF BOTH KNEES: ICD-10-CM

## 2021-03-23 DIAGNOSIS — I38 ACUTE ON CHRONIC SYSTOLIC HEART FAILURE DUE TO VALVULAR DISEASE (HCC): Primary | ICD-10-CM

## 2021-03-23 DIAGNOSIS — I48.0 PAF (PAROXYSMAL ATRIAL FIBRILLATION) (HCC): ICD-10-CM

## 2021-03-23 DIAGNOSIS — K66.8 PNEUMOPERITONEUM: ICD-10-CM

## 2021-03-23 DIAGNOSIS — I50.23 ACUTE ON CHRONIC SYSTOLIC HEART FAILURE DUE TO VALVULAR DISEASE (HCC): Primary | ICD-10-CM

## 2021-03-23 DIAGNOSIS — I95.9 HYPOTENSION, UNSPECIFIED HYPOTENSION TYPE: ICD-10-CM

## 2021-03-23 DIAGNOSIS — Z79.01 LONG TERM CURRENT USE OF ANTICOAGULANT: ICD-10-CM

## 2021-03-23 LAB
INTERNATIONAL NORMALIZATION RATIO, POC: 4.7
PROTHROMBIN TIME, POC: NORMAL

## 2021-03-23 PROCEDURE — 85610 PROTHROMBIN TIME: CPT | Performed by: FAMILY MEDICINE

## 2021-03-23 PROCEDURE — 99495 TRANSJ CARE MGMT MOD F2F 14D: CPT | Performed by: FAMILY MEDICINE

## 2021-03-23 RX ORDER — OXYCODONE HYDROCHLORIDE 5 MG/1
5 TABLET ORAL EVERY 6 HOURS PRN
Qty: 100 TABLET | Refills: 0 | Status: ON HOLD | OUTPATIENT
Start: 2021-03-23 | End: 2021-05-04 | Stop reason: HOSPADM

## 2021-03-23 RX ORDER — CARVEDILOL 25 MG/1
12.5 TABLET ORAL 2 TIMES DAILY WITH MEALS
Qty: 60 TABLET | Refills: 2
Start: 2021-03-23 | End: 2021-04-02 | Stop reason: ALTCHOICE

## 2021-03-23 ASSESSMENT — PATIENT HEALTH QUESTIONNAIRE - PHQ9
SUM OF ALL RESPONSES TO PHQ QUESTIONS 1-9: 0
SUM OF ALL RESPONSES TO PHQ QUESTIONS 1-9: 0
SUM OF ALL RESPONSES TO PHQ9 QUESTIONS 1 & 2: 0
SUM OF ALL RESPONSES TO PHQ QUESTIONS 1-9: 0
1. LITTLE INTEREST OR PLEASURE IN DOING THINGS: 0
2. FEELING DOWN, DEPRESSED OR HOPELESS: 0

## 2021-03-23 NOTE — PROGRESS NOTES
Subjective:      Patient ID: Emi Delatorre is a 77 y.o. female. CC: Patient presents for hospital follow-up. HPI Pt is here for a follow up but also in hospital follow-up. Patient was admitted to [facility] February 26 and discharged March 10. She originally was evaluated by cardiology with worsening shortness of breath and significant weight gain and admitted to the hospital with acute congestive heart failure. Her congestive heart failure symptoms improved rather quickly and kidneys did not become stressed. She did not demonstrate any atrial fibrillation during the hospitalization with recommendation for cardiology was to continue beta-blocker and long-term anticoagulation. She did develop small bowel pneumatosis with loculated pneumoperitoneum. General surgery was consulted and she had an NG tube placed and then she was completed 7 days of antibiotic and was on TPN. After the TPN she was started on small portions and told that she needs to drink protein drinks 3 times a day. Diabetes mellitus demonstrated a good control on her hemoglobin A1c but obviously with her eating status insulin was adjusted. She is now back with her back pain and still feeling abdominally very bloated. She is wondering if she should have epidural injections as she has had in the past.  General surgery evaluated patient yesterday and recommended she take protein drinks and make sure she drinks 8 glasses of fluid a day and they were going to reevaluate her in the next 2 weeks. Cardiology recently evaluated patient and increase her torsemide to 40 mg twice a day and restart Aldactone at 50 mg daily. She has been monitoring her weight and her weight is staying stable at home. Care Coordinator phone contact documented in Epic note for March 11, 2021. Eye exam current (within one year): Yes    Checks sugars at home: yes  Home blood sugar records: patient tests 4 time(s) per day  Any episodes of hypoglycemia? No    Current medication use: taking as prescribed  Medication side effects: none     Current diet: well balanced  Current exercise:not active    Review of Systems  Patient Active Problem List   Diagnosis    Long term current use of anticoagulant    Hypercholesteremia    Generalized osteoarthrosis, involving multiple sites    Eosinophilic gastritis    Paroxysmal SVT (supraventricular tachycardia) ([facility])    B12 deficiency    History of pulmonary embolism    Multiple pulmonary nodules    Type 2 diabetes mellitus with hyperglycemia, with long-term current use of insulin (HCC)    Asthma    Pneumoperitoneum    PAF (paroxysmal atrial fibrillation) ([address]    Hypertension    Cardiac arrhythmia    Encounter for loop recorder check    Coronary artery disease due to lipid rich plaque    Nonrheumatic mitral valve regurgitation    S/P CABG x 3    Goiter    Primary osteoarthritis of both knees    Splenic infarct    Obesity, Class I, BMI 30-34.9    Chronic combined systolic (congestive) and diastolic (congestive) heart failure ([facility])    Thoracic degenerative disc disease    Persistent atrial fibrillation ([facility])    S/P MVR (mitral valve repair)    Ischemic cardiomyopathy    Occlusion and stenosis of bilateral carotid arteries    Pneumatosis intestinalis of small intestine    Aortic valve stenosis    DVT (deep vein thrombosis) in pregnancy    Acute on chronic systolic heart failure due to valvular disease ([facility])    Stage 3 chronic kidney disease    Coronary artery disease involving native heart without angina pectoris    Hypotension       Outpatient Medications Marked as Taking for the 3/23/21 encounter (Office Visit) with Luisa Horn MD   Medication Sig Dispense Refill    torsemide (DEMADEX) 20 MG tablet 40 mg twice a day 120 tablet 0    spironolactone (ALDACTONE) 25 MG tablet Take 2 tablets by mouth daily 30 tablet 0    gabapentin (NEURONTIN) 300 MG capsule Take 1 capsule by mouth nightly for 90 days. Intended supply: 30 days 90 capsule 0    insulin glargine (LANTUS SOLOSTAR) 100 UNIT/ML injection pen INJECT 26 UNITS IN THE MORNING AND 18 UNITS AT BEDTIME 90 mL 1    carvedilol (COREG) 3.125 MG tablet Take 1 tablet by mouth 2 times daily (with meals) 60 tablet 0    exenatide (BYETTA 10 MCG PEN) 10 MCG/0.04ML injection Inject 0.04 mLs into the skin 2 times daily (with meals) 3 pen 1    OXYCODONE HCL PO Take 10 mg by mouth As of 1/21/2021,  states patient is taking 10mg with edge being taken off her pain after 3 hours.  ACETAMINOPHEN PO Take 500 mg by mouth every 6 hours as needed       blood glucose test strips (FREESTYLE LITE) strip USE TO TEST FOUR TIMES A  strip 4    albuterol sulfate  (90 Base) MCG/ACT inhaler USE 2 INHALATIONS EVERY 6 HOURS AS NEEDED FOR WHEEZING 25.5 g 2    potassium chloride (KLOR-CON M) 10 MEQ extended release tablet TAKE 1 TABLET DAILY 90 tablet 1    albuterol (PROVENTIL) (2.5 MG/3ML) 0.083% nebulizer solution Take 3 mLs by nebulization every 6 hours as needed for Wheezing 120 each 3    polyethylene glycol (GLYCOLAX) 17 GM/SCOOP powder Take 17 g by mouth every other day       omeprazole (PRILOSEC) 20 MG delayed release capsule Take 20 mg by mouth daily      FreeStyle Lancets MISC 1 each by Does not apply route 4 times daily 200 each 5    ondansetron (ZOFRAN) 4 MG tablet Take 1 tablet by mouth 3 times daily as needed for Nausea or Vomiting 30 tablet 0    warfarin (COUMADIN) 5 MG tablet TAKE 1 TABLET DAILY (Patient taking differently: Take 5 mg by mouth daily Managed by PCP) 100 tablet 4    montelukast (SINGULAIR) 10 MG tablet TAKE 1 TABLET NIGHTLY 90 tablet 4    Blood Glucose Monitoring Suppl (EMBRACE PRO GLUCOSE METER) GAETANO 1 Device by Does not apply route 4 times daily 1 Device 0    HUMALOG KWIKPEN 100 UNIT/ML SOPN TAKE AS INSTRUCTED BY YOUR PRESCRIBER (Patient taking differently:  Only if high blood sugar-has not been using) 15 mL 8  nystatin (MYCOSTATIN) 532525 UNIT/ML suspension TAKE ONE TEASPOONFUL BY MOUTH FOUR TIMES A DAY FOR 5 DAYS 1 Bottle 2    aspirin 81 MG chewable tablet Take 1 tablet by mouth daily 30 tablet 3    BD PEN NEEDLE JANNET U/F 32G X 4 MM MISC USE 1 PEN NEEDLE FOUR TIMES A  each 3    vitamin B-12 500 MCG tablet Take 1 tablet by mouth daily 30 tablet 3       Allergies   Allergen Reactions    Klzvvqxj-Pmsrfib-Dddvoj [Fluocinolone] Shortness Of Breath    Ciprofloxacin Shortness Of Breath    Diovan [Valsartan] Shortness Of Breath    Flagyl [Metronidazole] Shortness Of Breath     Has taken diflucan at home 12/7/15    Metformin And Related [Metformin And Related] Shortness Of Breath    Benazepril      Other reaction(s): Not Recorded    Morphine      Bad reaction. \"makes her feel horrible\".  Saxagliptin      Other reaction(s): Not Recorded    Levaquin [Levofloxacin] Rash       Social History     Tobacco Use    Smoking status: Never Smoker    Smokeless tobacco: Never Used   Substance Use Topics    Alcohol use: No       /70 (Site: Left Upper Arm, Position: Sitting, Cuff Size: Large Adult)   Pulse 119   Temp 96.8 °F (36 °C)   Resp 16   Wt 196 lb 4 oz (89 kg)   SpO2 95%   BMI 29.84 kg/m²     Objective:   Physical Exam  Constitutional:       General: She is not in acute distress. Appearance: She is well-developed. Neck:      Vascular: No carotid bruit. Cardiovascular:      Rate and Rhythm: Tachycardia present. Rhythm regularly irregular. Pulses:           Dorsalis pedis pulses are 2+ on the right side and 2+ on the left side. Posterior tibial pulses are 2+ on the right side and 2+ on the left side. Heart sounds: Murmur present. Systolic (Right sternal border) murmur present with a grade of 2/6. No friction rub. No gallop. Pulmonary:      Effort: Pulmonary effort is normal.      Breath sounds: Normal breath sounds.    Abdominal:      General: Bowel sounds are normal. There is distension. Palpations: Abdomen is soft. Tenderness: There is abdominal tenderness in the epigastric area and periumbilical area. There is no right CVA tenderness, left CVA tenderness or guarding. Hernia: No hernia is present. Musculoskeletal: Normal range of motion. General: No tenderness. Right lower leg: No edema. Left lower leg: No edema. Neurological:      General: No focal deficit present. Mental Status: She is alert. Sensory: Sensation is intact. Motor: Motor function is intact. Psychiatric:         Behavior: Behavior is cooperative. Assessment:      Ziggy Lockett was seen today for follow-up, hypertension, diabetes and follow-up from hospital.    Diagnoses and all orders for this visit:    Acute on chronic systolic heart failure due to valvular disease ([facility] [address]    Primary osteoarthritis of both knees  -     oxyCODONE (ROXICODONE) 5 MG immediate release tablet; Take 1 tablet by mouth every 6 hours as needed for Pain for up to 30 days. Long term current use of anticoagulant  -     POCT INR    Pneumoperitoneum    PAF (paroxysmal atrial fibrillation) (HCC)    Hypotension, unspecified hypotension type    Other orders  -     carvedilol (COREG) 25 MG tablet; Take 0.5 tablets by mouth 2 times daily (with meals)    OARRS report checked          Plan:      Hospital information reviewed with patient and     Concerned that the pneumo peritoneum is really causing her recurrent back pain as it always seems to occur during that time. Recommend she not pursue any pain management epidural injections at this time. Deafly recommended she continue follow-up with general surgery. So far no clear etiology of why she has recurrent pneumoperitoneum has been found. Maintain anticoagulation and Coumadin dosage was adjusted as when she left the hospital she was not on Coumadin.     Blood pressure is better now than during the hospitalization and she is off the midodrine medication. Continue to monitor blood sugars closely and continue daily weights    RTC 1 month    Medical decision making of moderate complexity. Please note that this chart was generated using Dragon dictation software. Although every effort was made to ensure the accuracy of this automated transcription, some errors in transcription may have occurred.

## 2021-03-24 PROBLEM — I82.90 DEEP VEIN THROMBOSIS (DVT) OF NON-EXTREMITY VEIN: Status: ACTIVE | Noted: 2020-12-15

## 2021-03-24 PROBLEM — Z95.1 S/P CABG X 3: Status: RESOLVED | Noted: 2018-08-22 | Resolved: 2021-03-24

## 2021-03-24 NOTE — PROGRESS NOTES
[facility]    H+P // CONSULT // OUTPATIENT VISIT // FOLLOWUP VISIT     Referring Doctor Christen Jackson MD   Encounter Type Followup     CHIEF COMPLAINT     Visit Type Chronic   Symptoms SOB   Problems AS, MR s/p MVR, CAD s/p CABG, pAFIB, HTN, CHOL, DVT     HISTORY OF PRESENT ILLNESS     GEN - Doing fairly well. No cp or sob. Noted increased weight with protein diet recommended by surgery. Increased torsemide to 40bid. Did not take metolazone as it causes hypotension. Following with CHF. AS - moderate by recent stress echo with MG 20. CAD - Denies cp, sob, dizziness, syncope, palpitations. AFIB - followed by EP. HTN - Ambulatory BP readings in good range. No HA or dizziness. CHOL - Last cholesterol reviewed and in good range. No statin due to intolerance  MED - Compliant with CV meds listed below without notable side effects. HISTORY/ALLERGIES/ROS     MedHx:  has a past medical history of Asthma, Atrial fibrillation ([address] Eosinophilia, Hemoptysis, HIGH CHOLESTEROL, Hx of blood clots, Hypertension, Irregular heart beat, Other specified gastritis without mention of hemorrhage, Palpitations, Skin cancer, and Type II or unspecified type diabetes mellitus without mention of complication, not stated as uncontrolled. SurgHx:  has a past surgical history that includes Cholecystectomy;  section; Colonoscopy (2017); skin biopsy; bronchoscopy (2016); Coronary artery bypass graft (2018); Mitral valve replacement (2018); transesophageal echocardiogram (2018); Tunneled venous catheter placement (Left, 2018); Cardiac catheterization (2018); Insertable Cardiac Monitor (Left, 2018); Colonoscopy (2014); Hysterectomy; Upper gastrointestinal endoscopy (N/A, 10/10/2018); Colonoscopy (10/10/2018); Colonoscopy (N/A, 2020); Pain management procedure (N/A, 2020); and Pain management procedure (Bilateral, 2021).    SocHx: reports that she has never smoked. She has never used smokeless tobacco. She reports that she does not drink alcohol or use drugs. FamHx: family history includes Asthma in her mother; Cancer in her father; Heart Disease in her mother; High Blood Pressure in her mother; Hypertension in her mother. Allerg:Kfyuidtu-wifkhiu-kdirbx [fluocinolone], Ciprofloxacin, Diovan [valsartan], Flagyl [metronidazole], Metformin and related [metformin and related], Benazepril, Morphine, Saxagliptin, and Levaquin [levofloxacin]   ROS: [x]Full ROS obtained and negative except as mentioned in HPI     MEDICATIONS      Current Outpatient Medications   Medication Sig Dispense Refill    carvedilol (COREG) 25 MG tablet Take 0.5 tablets by mouth 2 times daily (with meals) 60 tablet 2    oxyCODONE (ROXICODONE) 5 MG immediate release tablet Take 1 tablet by mouth every 6 hours as needed for Pain for up to 30 days. 100 tablet 0    torsemide (DEMADEX) 20 MG tablet 40 mg twice a day 120 tablet 0    spironolactone (ALDACTONE) 25 MG tablet Take 2 tablets by mouth daily 30 tablet 0    gabapentin (NEURONTIN) 300 MG capsule Take 1 capsule by mouth nightly for 90 days. Intended supply: 30 days 90 capsule 0    insulin glargine (LANTUS SOLOSTAR) 100 UNIT/ML injection pen INJECT 26 UNITS IN THE MORNING AND 18 UNITS AT BEDTIME 90 mL 1    exenatide (BYETTA 10 MCG PEN) 10 MCG/0.04ML injection Inject 0.04 mLs into the skin 2 times daily (with meals) 3 pen 1    OXYCODONE HCL PO Take 10 mg by mouth As of 1/21/2021,  states patient is taking 10mg with edge being taken off her pain after 3 hours.       ACETAMINOPHEN PO Take 500 mg by mouth every 6 hours as needed       blood glucose test strips (FREESTYLE LITE) strip USE TO TEST FOUR TIMES A  strip 4    albuterol sulfate  (90 Base) MCG/ACT inhaler USE 2 INHALATIONS EVERY 6 HOURS AS NEEDED FOR WHEEZING 25.5 g 2    potassium chloride (KLOR-CON M) 10 MEQ extended release tablet TAKE 1 TABLET DAILY 90 tablet 1    albuterol (PROVENTIL) (2.5 MG/3ML) 0.083% nebulizer solution Take 3 mLs by nebulization every 6 hours as needed for Wheezing 120 each 3    polyethylene glycol (GLYCOLAX) 17 GM/SCOOP powder Take 17 g by mouth every other day       omeprazole (PRILOSEC) 20 MG delayed release capsule Take 20 mg by mouth daily      FreeStyle Lancets MISC 1 each by Does not apply route 4 times daily 200 each 5    ondansetron (ZOFRAN) 4 MG tablet Take 1 tablet by mouth 3 times daily as needed for Nausea or Vomiting 30 tablet 0    warfarin (COUMADIN) 5 MG tablet TAKE 1 TABLET DAILY (Patient taking differently: Take 5 mg by mouth daily Managed by PCP) 100 tablet 4    montelukast (SINGULAIR) 10 MG tablet TAKE 1 TABLET NIGHTLY 90 tablet 4    Blood Glucose Monitoring Suppl (UrGiftACE PRO GLUCOSE METER) GAETANO 1 Device by Does not apply route 4 times daily 1 Device 0    HUMALOG KWIKPEN 100 UNIT/ML SOPN TAKE AS INSTRUCTED BY YOUR PRESCRIBER (Patient taking differently: Only if high blood sugar-has not been using) 15 mL 8    nystatin (MYCOSTATIN) 996916 UNIT/ML suspension TAKE ONE TEASPOONFUL BY MOUTH FOUR TIMES A DAY FOR 5 DAYS 1 Bottle 2    aspirin 81 MG chewable tablet Take 1 tablet by mouth daily 30 tablet 3    BD PEN NEEDLE JANNET U/F 32G X 4 MM MISC USE 1 PEN NEEDLE FOUR TIMES A  each 3    vitamin B-12 500 MCG tablet Take 1 tablet by mouth daily 30 tablet 3     No current facility-administered medications for this visit.       Reviewed with patient and will remain unchanged except as mentioned in A/P  PHYSICAL EXAM     Vitals:    03/26/21 0729   BP: 98/64   Pulse: 107   SpO2: 97%      Gen Alert, coop, no distress Heart  Rrr, 3/6   Head NC, AT, no abnorm Abd  Soft, NT, +BS, no mass, no OM   Eyes PER, conj/corn clear Ext  Ext nl, AT, no C/C, tr edema   Nose Nares nl, no drain, NT Pulse 2+ and symmetric   Throat Lips, mucosa, tongue nl Skin Col/text/turg nl, no vis rash/les   Neck S/S, TM, NT, no Latonya Arias may have prepopulated components of this note with my historical  intellectual property under my direct supervision. Any additions to this intellectual property were performed in my presence and at my direction. Furthermore, the content and accuracy of this note have been reviewed by duran Johnson MD).  3/26/2021 7:15 AM    CODING     Category Diagnosis   Stable chronic illness  (26749/21389 - 2 or more) AS, MR, CAD, AFIB, HTN, CHOL, DVT   Chronic illness with: Exac, progr or SA of Tx  (43477/78911 - 1 or more)    Undiagnosed new problem with: uncertain prognosis  (85418/58850 - 1 or more)    Acute illness with systemic Sx  (68371/24898 - 1 or more)    Acute, complicated injury  (46962/65349 - 1 or more)    78410 1 or more chronic illness with exacerbation, progression or SA of treatment    Time  30-39 minutes spent preparing to see patient including reviewing patient history/prior tests/prior consults, performing a medical exam, counseling and educating patient/family/caregiver, ordering medications/tests/procedures, referring and communicating with PCPs and other pertinent consultants, documenting information in the EMR, independently interpreting results and communicating to family and coordination of patient care.

## 2021-03-25 ENCOUNTER — TELEPHONE (OUTPATIENT)
Dept: CARDIOLOGY CLINIC | Age: 76
End: 2021-03-25

## 2021-03-25 DIAGNOSIS — I50.23 ACUTE ON CHRONIC SYSTOLIC HEART FAILURE DUE TO VALVULAR DISEASE (HCC): Primary | ICD-10-CM

## 2021-03-25 DIAGNOSIS — I38 ACUTE ON CHRONIC SYSTOLIC HEART FAILURE DUE TO VALVULAR DISEASE (HCC): Primary | ICD-10-CM

## 2021-03-25 NOTE — TELEPHONE ENCOUNTER
She was told to drink 3 protein shakes along with 64 ounces of fluid and now weight is climbing up  She also ate some ham    She will cut fluids (total amount including protein shakes to 64 ounces and no ham  Increase torsemide to 50 mg  Bid  She will get labs done

## 2021-03-25 NOTE — TELEPHONE ENCOUNTER
Last OV 3/16/21 JOSE L ADHIKARI  Spoke to the pt's /EC-stated the BP's have been as follows- 3//70, 3/25-99/77, 3//68. No chest pain.

## 2021-03-25 NOTE — TELEPHONE ENCOUNTER
Patient has gained 6 pounds in 3 days and both feet are swollen . Feet so big she cant get shoes on , SOB and low b/p .  Please call to advise

## 2021-03-26 ENCOUNTER — OFFICE VISIT (OUTPATIENT)
Dept: CARDIOLOGY CLINIC | Age: 76
End: 2021-03-26
Payer: MEDICARE

## 2021-03-26 ENCOUNTER — TELEPHONE (OUTPATIENT)
Dept: CARDIOLOGY CLINIC | Age: 76
End: 2021-03-26

## 2021-03-26 ENCOUNTER — CARE COORDINATION (OUTPATIENT)
Dept: CASE MANAGEMENT | Age: 76
End: 2021-03-26

## 2021-03-26 ENCOUNTER — HOSPITAL ENCOUNTER (OUTPATIENT)
Age: 76
Discharge: HOME | End: 2021-03-26
Payer: MEDICARE

## 2021-03-26 VITALS
HEIGHT: 68 IN | OXYGEN SATURATION: 97 % | BODY MASS INDEX: 30.62 KG/M2 | WEIGHT: 202 LBS | HEART RATE: 107 BPM | SYSTOLIC BLOOD PRESSURE: 98 MMHG | DIASTOLIC BLOOD PRESSURE: 64 MMHG

## 2021-03-26 DIAGNOSIS — I48.0 PAF (PAROXYSMAL ATRIAL FIBRILLATION) (HCC): ICD-10-CM

## 2021-03-26 DIAGNOSIS — Z95.1 S/P CABG X 3: ICD-10-CM

## 2021-03-26 DIAGNOSIS — I10 ESSENTIAL HYPERTENSION: ICD-10-CM

## 2021-03-26 DIAGNOSIS — I34.0 NONRHEUMATIC MITRAL VALVE REGURGITATION: ICD-10-CM

## 2021-03-26 DIAGNOSIS — I25.10 CORONARY ARTERY DISEASE DUE TO LIPID RICH PLAQUE: ICD-10-CM

## 2021-03-26 DIAGNOSIS — I25.83 CORONARY ARTERY DISEASE DUE TO LIPID RICH PLAQUE: ICD-10-CM

## 2021-03-26 DIAGNOSIS — I38 ACUTE ON CHRONIC SYSTOLIC HEART FAILURE DUE TO VALVULAR DISEASE (HCC): ICD-10-CM

## 2021-03-26 DIAGNOSIS — I35.0 NONRHEUMATIC AORTIC VALVE STENOSIS: Primary | ICD-10-CM

## 2021-03-26 DIAGNOSIS — I82.90 DEEP VEIN THROMBOSIS (DVT) OF NON-EXTREMITY VEIN, UNSPECIFIED CHRONICITY: ICD-10-CM

## 2021-03-26 DIAGNOSIS — E78.00 HYPERCHOLESTEREMIA: ICD-10-CM

## 2021-03-26 DIAGNOSIS — Z98.890 S/P MVR (MITRAL VALVE REPAIR): ICD-10-CM

## 2021-03-26 DIAGNOSIS — I50.23 ACUTE ON CHRONIC SYSTOLIC HEART FAILURE DUE TO VALVULAR DISEASE (HCC): ICD-10-CM

## 2021-03-26 LAB
ANION GAP SERPL CALCULATED.3IONS-SCNC: 9 MMOL/L (ref 3–16)
BUN BLDV-MCNC: 38 MG/DL (ref 7–20)
CALCIUM SERPL-MCNC: 8.5 MG/DL (ref 8.3–10.6)
CHLORIDE BLD-SCNC: 93 MMOL/L (ref 99–110)
CO2: 29 MMOL/L (ref 21–32)
CREAT SERPL-MCNC: 1.6 MG/DL (ref 0.6–1.2)
GFR AFRICAN AMERICAN: 38
GFR NON-AFRICAN AMERICAN: 31
GLUCOSE BLD-MCNC: 118 MG/DL (ref 70–99)
POTASSIUM SERPL-SCNC: 3.6 MMOL/L (ref 3.5–5.1)
PRO-BNP: 7284 PG/ML (ref 0–449)
SODIUM BLD-SCNC: 131 MMOL/L (ref 136–145)

## 2021-03-26 PROCEDURE — 80048 BASIC METABOLIC PNL TOTAL CA: CPT

## 2021-03-26 PROCEDURE — 83880 ASSAY OF NATRIURETIC PEPTIDE: CPT

## 2021-03-26 PROCEDURE — 36415 COLL VENOUS BLD VENIPUNCTURE: CPT

## 2021-03-26 PROCEDURE — 99214 OFFICE O/P EST MOD 30 MIN: CPT | Performed by: INTERNAL MEDICINE

## 2021-03-26 NOTE — LETTER
[address] [place] [address] [address]  Phone: [phone]  Fax: [phone]    Ronak Castillo MD        2021     Blanca Corado MD  [facility].    Patient: Corin Bell  MR Number: [id number]  YOB: 1945  Date of Visit: 3/26/2021    Dear Dr. Arriaga Course    H+P // CONSULT // OUTPATIENT VISIT // [facility]don[facility] Storm VISIT     Referring Doctor Blanca Corado MD   Encounter Type Followup     CHIEF COMPLAINT     Visit Type Chronic   Symptoms SOB   Problems AS, MR s/p MVR, CAD s/p CABG, pAFIB, HTN, CHOL, DVT     HISTORY OF PRESENT ILLNESS      GEN - Doing fairly well. No cp or sob. Noted increased weight with protein diet recommended by surgery. Increased torsemide to 40bid. Did not take metolazone as it causes hypotension. Following with CHF.  AS - moderate by recent stress echo with MG 20.     CAD - Denies cp, sob, dizziness, syncope, palpitations.  AFIB - followed by EP.     HTN - Ambulatory BP readings in good range. No HA or dizziness.  CHOL - Last cholesterol reviewed and in good range. No statin due to intolerance   MED - Compliant with CV meds listed below without notable side effects. HISTORY/ALLERGIES/ROS     MedHx:  has a past medical history of Asthma, Atrial fibrillation ([facility] [address] Eosinophilia, Hemoptysis, HIGH CHOLESTEROL, Hx of blood clots, Hypertension, Irregular heart beat, Other specified gastritis without mention of hemorrhage, Palpitations, Skin cancer, and Type II or unspecified type diabetes mellitus without mention of complication, not stated as uncontrolled. SurgHx:  has a past surgical history that includes Cholecystectomy;  section; Colonoscopy (2017); skin biopsy; bronchoscopy (2016); Coronary artery bypass graft (2018);  Mitral valve replacement (2018); transesophageal echocardiogram (2018); Tunneled venous catheter placement (Left, 08/23/2018); Cardiac catheterization (08/16/2018); Insertable Cardiac Monitor (Left, 08/16/2018); Colonoscopy (01/17/2014); Hysterectomy; Upper gastrointestinal endoscopy (N/A, 10/10/2018); Colonoscopy (10/10/2018); Colonoscopy (N/A, 8/7/2020); Pain management procedure (N/A, 12/18/2020); and Pain management procedure (Bilateral, 1/18/2021). SocHx:  reports that she has never smoked. She has never used smokeless tobacco. She reports that she does not drink alcohol or use drugs. FamHx: family history includes Asthma in her mother; Cancer in her father; Heart Disease in her mother; High Blood Pressure in her mother; Hypertension in her mother. Allerg:Uwbfctjf-[id number]-bvrenq [fluocinolone], Ciprofloxacin, Diovan [valsartan], Flagyl [metronidazole], Metformin and related [metformin and related], Benazepril, Morphine, Saxagliptin, and Levaquin [levofloxacin]   ROS: [x]Full ROS obtained and negative except as mentioned in HPI     MEDICATIONS      Current Outpatient Medications   Medication Sig Dispense Refill    carvedilol (COREG) 25 MG tablet Take 0.5 tablets by mouth 2 times daily (with meals) 60 tablet 2    oxyCODONE (ROXICODONE) 5 MG immediate release tablet Take 1 tablet by mouth every 6 hours as needed for Pain for up to 30 days. 100 tablet 0    torsemide (DEMADEX) 20 MG tablet 40 mg twice a day 120 tablet 0    spironolactone (ALDACTONE) 25 MG tablet Take 2 tablets by mouth daily 30 tablet 0    gabapentin (NEURONTIN) 300 MG capsule Take 1 capsule by mouth nightly for 90 days.  Intended supply: 30 days 90 capsule 0    insulin glargine (LANTUS SOLOSTAR) 100 UNIT/ML injection pen INJECT 26 UNITS IN THE MORNING AND 18 UNITS AT BEDTIME 90 mL 1    exenatide (BYETTA 10 MCG PEN) 10 MCG/0.04ML injection Inject 0.04 mLs into the skin 2 times daily (with meals) 3 pen 1    OXYCODONE HCL PO Take 10 mg by mouth As of 1/21/2021,  states patient is taking 10mg with edge being taken off her pain after 3 hours.  ACETAMINOPHEN PO Take 500 mg by mouth every 6 hours as needed       blood glucose test strips (FREESTYLE LITE) strip USE TO TEST FOUR TIMES A  strip 4    albuterol sulfate  (90 Base) MCG/ACT inhaler USE 2 INHALATIONS EVERY 6 HOURS AS NEEDED FOR WHEEZING 25.5 g 2    potassium chloride (KLOR-CON M) 10 MEQ extended release tablet TAKE 1 TABLET DAILY 90 tablet 1    albuterol (PROVENTIL) (2.5 MG/3ML) 0.083% nebulizer solution Take 3 mLs by nebulization every 6 hours as needed for Wheezing 120 each 3    polyethylene glycol (GLYCOLAX) 17 GM/SCOOP powder Take 17 g by mouth every other day       omeprazole (PRILOSEC) 20 MG delayed release capsule Take 20 mg by mouth daily      FreeStyle Lancets MISC 1 each by Does not apply route 4 times daily 200 each 5    ondansetron (ZOFRAN) 4 MG tablet Take 1 tablet by mouth 3 times daily as needed for Nausea or Vomiting 30 tablet 0    warfarin (COUMADIN) 5 MG tablet TAKE 1 TABLET DAILY (Patient taking differently: Take 5 mg by mouth daily Managed by PCP) 100 tablet 4    montelukast (SINGULAIR) 10 MG tablet TAKE 1 TABLET NIGHTLY 90 tablet 4    Blood Glucose Monitoring Suppl (EMBRACE PRO GLUCOSE METER) GAETANO 1 Device by Does not apply route 4 times daily 1 Device 0    HUMALOG KWIKPEN 100 UNIT/ML SOPN TAKE AS INSTRUCTED BY YOUR PRESCRIBER (Patient taking differently: Only if high blood sugar-has not been using) 15 mL 8    nystatin (MYCOSTATIN) 590392 UNIT/ML suspension TAKE ONE TEASPOONFUL BY MOUTH FOUR TIMES A DAY FOR 5 DAYS 1 Bottle 2    aspirin 81 MG chewable tablet Take 1 tablet by mouth daily 30 tablet 3    BD PEN NEEDLE JANNET U/F 32G X 4 MM MISC USE 1 PEN NEEDLE FOUR TIMES A  each 3    vitamin B-12 500 MCG tablet Take 1 tablet by mouth daily 30 tablet 3     No current facility-administered medications for this visit.       Reviewed with patient and will remain unchanged except as mentioned in A/P PHYSICAL EXAM     Vitals:    03/26/21 0729   BP: 98/64   Pulse: 107   SpO2: 97%      Gen Alert, coop, no distress Heart  Rrr, 3/6   Head NC, AT, no abnorm Abd  Soft, NT, +BS, no mass, no OM   Eyes PER, conj/corn clear Ext  Ext nl, AT, no C/C, tr edema   Nose Nares nl, no drain, NT Pulse 2+ and symmetric   Throat Lips, mucosa, tongue nl Skin Col/text/turg nl, no vis rash/les   Neck S/S, TM, NT, no bruit/JVD Psych Nl mood and affect   Lung CTA-B, unlabored, no DTP Lymph   No cervical or axillary LA   Ch wall NT, no deform Neuro  Nl gross M/S exam     ASSESSMENT AND PLAN     *AS/MR   Date EF Detail   Sx   Sob, wall, pnd   Hx 8/18  MV replacement w/ 27 mm Medtronic Cinch tissue valve   NYHA   III   TTE 10/18  9/19  9/20 50%  50%  30% Bio MV MG 12, mild AS MG 12, mod TR  Bio MV MG 9, mild AS MG 13, mild to mod TR, apical akinesis  Bio MV MG 7, mild AS MG 16   JUDE 10/20 25% Bio MV MG 5, L atrial enlargement, a large stump of the LA appendage w/ no thrombus, AS, Mod TR   STTE 11/20 25% Mod AS with MG 20 at 20mcg dobutamine, bio MV MG 7   Plan   Continued observation.   Interval echos  FU with CHF  Continue torsemide 40bid, salt restriction, fluid restriction, check labs today   *CAD   Date EF Results   Sx   sob   Hx 8/18  CABGx3 LIMA-LAD, SVG-D1 and Posterior  LV branch of RCA Damion Shank)   C 8/18  MVD -> CABG (Omer Gamble)   Plan   Continue aggressive medical treatment at doses above   *AFIB/Aflutter  Status Parox, 8/18 MAZE with WAYNE clip, 10/19 RFCA with PVI ablation, 1/20 CV, 10/20 CV, 8/18 LR implant   Plan Continue AC, follows with EP  *HTN  Status Controlled  Plan Counseled on diet/salt/exercise/weight, continue meds at doses above  *CHOL  Status  Uncontrolled with last LDL of  97 (goal <70) and HDL of 40 (12/20)  Plan Counseled on diet/exercise/weight, lipid/liver surveillance per PCP, intolerant to statins  *DVT/PE  Status On coumadin  Plan Plan continue [facility]  *COMPLIANCE  Status Compliant  Plan Discussed importance of compliance with meds/diet/salt/exercise; avoid tob/alc/drugs; patient verbalized understanding  *FOLLOWUP  With [address]bs, am scribing for and in the presence of Jeffrey Padilla MD.   SignedClara 03/24/21 8:30 AM   Provider Rick Duncan is working as a scribe for and in the presence of me Jeffrey Padilla MD). Working as a scribe, Clara Nunes may have prepopulated components of this note with my historical  intellectual property under my direct supervision. Any additions to this intellectual property were performed in my presence and at my direction. Furthermore, the content and accuracy of this note have been reviewed by me Jeffrey Padilla MD).  3/26/2021 7:15 AM    CODING     Category Diagnosis   Stable chronic illness  (40379/11643 - 2 or more) AS, MR, CAD, AFIB, HTN, CHOL, DVT   Chronic illness with: Exac, progr or SA of Tx  (00280/14532 - 1 or more)    Undiagnosed new problem with: uncertain prognosis  (88292/34113 - 1 or more)    Acute illness with systemic Sx  (48310/08615 - 1 or more)    Acute, complicated injury  (79869/48612 - 1 or more)    33729 1 or more chronic illness with exacerbation, progression or SA of treatment    Time  30-39 minutes spent preparing to see patient including reviewing patient history/prior tests/prior consults, performing a medical exam, counseling and educating patient/family/caregiver, ordering medications/tests/procedures, referring and communicating with PCPs and other pertinent consultants, documenting information in the EMR, independently interpreting results and communicating to family and coordination of patient care. If you have questions, please do not hesitate to call me. I look forward to following [facility] along with you.     Sincerely,        Lesly Leon MD

## 2021-03-26 NOTE — TELEPHONE ENCOUNTER
She will cut fluids (total amount including protein shakes to 64 ounces and no ham  Increase torsemide to 50 mg  Bid  She will get labs done    The above is what I told her yesterday   50 mg bid is the same as 100 daily  She can choose which way she wants to take it

## 2021-03-26 NOTE — TELEPHONE ENCOUNTER
Called and spoke with patient and informed her of message below. She states that she is very confused. At her appt today she states that Dr Scarlett Hadley told her to go back to the 100 mg and she states that NPRG lowered her to 50 mg of torsemide. Please advise she states that her weight is 202lbs today in office.  Thanks

## 2021-03-26 NOTE — TELEPHONE ENCOUNTER
----- Message from SOLEDAD Valera - CNS sent at 3/26/2021  2:51 PM EDT -----  Please find out how she is doing today  I changed her diuretics yesterday and cut back on her fluids  Her labs definitely show increased fluid  If she is not better on Monday we need to give her a dose of IV lasix  thanks

## 2021-03-26 NOTE — CARE COORDINATION
[address] Transitions Follow Up Call    3/26/2021    Patient: Samm Myers  Patient : 1945   MRN: [id number]  Reason for Admission:   Discharge Date: 3/10/21 RARS: Readmission Risk Score: 25         Pt states doing ok, sill having some abd pain and swelling, takes a pain pill as needed. Denies SOB, CP,does have some swelling in her feet. Weight today was 199.8 lbs. Saw Dr Marcela Kang today. Next F/U appts listed below.  Agreed to more CTC f/u calls      Follow Up  Future Appointments   Date Time Provider Tawana Young   2021  7:30 AM Baljit Ly MD  Cardio [facility]   2021 11:00 AM SCHEDULE, [place] REMOTE TRANSMISSION  Cardio [facility]   2021 10:30 AM Vinie Severance, MD  G&L SURG [facility]   2021  7:30 AM Lissette Mendez MD [facility]       Geovanny Pi, RN

## 2021-03-29 ENCOUNTER — TELEPHONE (OUTPATIENT)
Dept: CARDIOLOGY CLINIC | Age: 76
End: 2021-03-29

## 2021-03-29 NOTE — TELEPHONE ENCOUNTER
Pt called on answering service is confused on her medications and needs help.  Pls call to advise Thank you

## 2021-03-29 NOTE — TELEPHONE ENCOUNTER
She is not on lasix  She should take Torsemide 50 mg bid or 100 mg once a day same thing as her fluid level went from 4312->7284  Cut fluids down to 64 ounces a day  She should get labs again this week  thanks

## 2021-03-29 NOTE — TELEPHONE ENCOUNTER
Spoke to the pt-JOSE L Mcdaniel, asked her to take Furosemide 100 mg, and Dr. Isabel Martinez told her to take 80 mg. Which is correct?

## 2021-03-31 NOTE — PROGRESS NOTES
[address]  Advanced CHF/Pulmonary Hypertension   Cardiac Evaluation      Adrián Odonnell  YOB: 1945    Date of Visit:  4/2/21    Chief Complaint   Patient presents with    Extremity Weakness    Shortness of Breath      History of Present Illness:  Adrián Odonnell is a 77 y.o. female who presents from referral from Dr. Alycia Pereira for consultation and management of worsening heart failure; JUDE on 10/21/20 showed EF 25%. ECHO 9/15/2020 with EF 25-30%. She saw Dr. Alycia Pereira 11/12/2020 c/o LEVIN; she sees him for AS. 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 She's had two DCCV's, ultimately unsuccessful. Patient did not tolerate metolazone due to hypotension in the past. She was hospitalized March 2021 for small bowel pneumotosis with loculated pneumoperitoneum and was on TPN during that time. Today, she states she is feeling better than she has in awhile. Anastacia Gamez is here in a wheelchair today. She is with her . Her dry weight is 199 lbs. At home her weight has been around 203 lbs. She states that she was told by the GI doctor that she still has free air in her abdomen. She still feels like she has bloating in her abdomen. Labs from yesterday were improved from previous labs. She denies having any shortness of breath or chest pain. Allergies   Allergen Reactions    Rqdjkfpz-Koydgea-Gbfdia [Fluocinolone] Shortness Of Breath    Ciprofloxacin Shortness Of Breath    Diovan [Valsartan] Shortness Of Breath    Flagyl [Metronidazole] Shortness Of Breath     Has taken diflucan at home 12/7/15    Metformin And Related [Metformin And Related] Shortness Of Breath    Benazepril      Other reaction(s): Not Recorded    Morphine      Bad reaction. \"makes her feel horrible\".      Saxagliptin      Other reaction(s): Not Recorded    Levaquin [Levofloxacin] Rash     Current Outpatient Medications   Medication Sig Dispense Refill    carvedilol (COREG) 25 MG tablet Take 0.5 tablets by mouth 2 times daily (with meals) 60 tablet 2    oxyCODONE (ROXICODONE) 5 MG immediate release tablet Take 1 tablet by mouth every 6 hours as needed for Pain for up to 30 days. 100 tablet 0    torsemide (DEMADEX) 20 MG tablet 40 mg twice a day (Patient taking differently: 40mg morning, 40mg afternoon, 20mg evening) 120 tablet 0    spironolactone (ALDACTONE) 25 MG tablet Take 2 tablets by mouth daily 30 tablet 0    gabapentin (NEURONTIN) 300 MG capsule Take 1 capsule by mouth nightly for 90 days. Intended supply: 30 days 90 capsule 0    insulin glargine (LANTUS SOLOSTAR) 100 UNIT/ML injection pen INJECT 26 UNITS IN THE MORNING AND 18 UNITS AT BEDTIME 90 mL 1    exenatide (BYETTA 10 MCG PEN) 10 MCG/0.04ML injection Inject 0.04 mLs into the skin 2 times daily (with meals) 3 pen 1    ACETAMINOPHEN PO Take 500 mg by mouth every 6 hours as needed       albuterol sulfate  (90 Base) MCG/ACT inhaler USE 2 INHALATIONS EVERY 6 HOURS AS NEEDED FOR WHEEZING 25.5 g 2    potassium chloride (KLOR-CON M) 10 MEQ extended release tablet TAKE 1 TABLET DAILY 90 tablet 1    albuterol (PROVENTIL) (2.5 MG/3ML) 0.083% nebulizer solution Take 3 mLs by nebulization every 6 hours as needed for Wheezing 120 each 3    polyethylene glycol (GLYCOLAX) 17 GM/SCOOP powder Take 17 g by mouth every other day       omeprazole (PRILOSEC) 20 MG delayed release capsule Take 20 mg by mouth daily      ondansetron (ZOFRAN) 4 MG tablet Take 1 tablet by mouth 3 times daily as needed for Nausea or Vomiting 30 tablet 0    warfarin (COUMADIN) 5 MG tablet TAKE 1 TABLET DAILY (Patient taking differently: Take 5 mg by mouth daily Managed by PCP) 100 tablet 4    montelukast (SINGULAIR) 10 MG tablet TAKE 1 TABLET NIGHTLY 90 tablet 4    HUMALOG KWIKPEN 100 UNIT/ML SOPN TAKE AS INSTRUCTED BY YOUR PRESCRIBER (Patient taking differently:  Only if high blood sugar-has not been using) 15 mL 8    nystatin (MYCOSTATIN) 656065 UNIT/ML suspension TAKE ONE TEASPOONFUL BY MOUTH FOUR TIMES A DAY FOR 5 DAYS 1 Bottle 2    aspirin 81 MG chewable tablet Take 1 tablet by mouth daily 30 tablet 3    vitamin B-12 500 MCG tablet Take 1 tablet by mouth daily 30 tablet 3    OXYCODONE HCL PO Take 10 mg by mouth As of 1/21/2021,  states patient is taking 10mg with edge being taken off her pain after 3 hours.  blood glucose test strips (FREESTYLE LITE) strip USE TO TEST FOUR TIMES A  strip 4    FreeStyle Lancets MISC 1 each by Does not apply route 4 times daily 200 each 5    Blood Glucose Monitoring Suppl (EMBRACE PRO GLUCOSE METER) GAETANO 1 Device by Does not apply route 4 times daily 1 Device 0    BD PEN NEEDLE JANNET U/F 32G X 4 MM MISC USE 1 PEN NEEDLE FOUR TIMES A  each 3     No current facility-administered medications for this visit.         Past Medical History:   Diagnosis Date    Asthma     Atrial fibrillation (HCC)     Eosinophilia     Hemoptysis     HIGH CHOLESTEROL     Hx of blood clots     Hypertension     Irregular heart beat     Other specified gastritis without mention of hemorrhage     Palpitations     Skin cancer     basal and squamous    Type II or unspecified type diabetes mellitus without mention of complication, not stated as uncontrolled      Past Surgical History:   Procedure Laterality Date    BRONCHOSCOPY  07/18/2016    Dr. Geovanni Quarles - brushings from [address] [place] [address],[place] [address]  08/16/2018    Dr. Nichole Roy  02/07/2017    Dr. Annemarie Burt - sigmoid diverticulosis, polypectomies x3    COLONOSCOPY  01/17/2014    Dr. Annemarie Burt - sigmoid diverticulosis, polypectomies x3, internal hemorrhoids    COLONOSCOPY  10/10/2018    w/biopsy performed by Stefanie Logan MD at [address] [facility] N/A 8/7/2020    COLONOSCOPY DIAGNOSTIC performed by Geovanni Quarles MD at [address] [place] [address] GRAFT  08/21/2018    Dr. Elva Pisano - x3 (LIMA-LAD, L SV-D1-PLV) modified BL MAZE procedure w/obliteration of WAYNE using 45mm AtriClip    HYSTERECTOMY      INSERTABLE CARDIAC MONITOR Left 08/16/2018    Dr. Randal Lopez # [id number] [employer]    MITRAL VALVE REPLACEMENT  08/21/2018    Dr. Elva Pisano - 27mm [employer] Cinch tissue valve    PAIN MANAGEMENT PROCEDURE N/A 12/18/2020    C6-C7 MIDLINE  EPIDURAL STEROID INJECTION WITH FLUOROSCOPY performed by Robbin Aguirre MD at [address] [facility] Bilateral 1/18/2021    BILATERAL T11 TRANSFORAMINAL EPIDURAL STEROID INJECTION WITH FLUOROSCOPY performed by Robbin Aguirre MD at [facility] TRANSESOPHAGEAL ECHOCARDIOGRAM  08/21/2018    during CABG/MVR    TUNNELED VENOUS CATHETER PLACEMENT Left 08/23/2018    Dr. Tirso Bryant - IJ for HD---since removed    UPPER GASTROINTESTINAL ENDOSCOPY N/A 10/10/2018    w/biopsy performed by Dilip Hansen MD at [address] [place] [address]     Family History   Problem Relation Age of Onset    Cancer Father     Asthma Mother     Hypertension Mother     Heart Disease Mother     High Blood Pressure Mother      Social History     Socioeconomic History    Marital status:      Spouse name: Not on file    Number of children: Not on file    Years of education: Not on file    Highest education level: Not on file   Occupational History    Not on file   Social Needs    Financial resource strain: Not on file    Food insecurity     Worry: Not on file     Inability: Not on file    Transportation needs     Medical: Not on file     Non-medical: Not on file   Tobacco Use    Smoking status: Never Smoker    Smokeless tobacco: Never Used   Substance and Sexual Activity    Alcohol use: No    Drug use: No    Sexual activity: Not on file   Lifestyle    Physical activity     Days per week: Not on file     Minutes per session: Not on file    Stress: Not on file   Relationships    Social connections     Talks on phone: Not on file     Gets together: Not on file     Attends Mu-ism service: Not on file     Active member of club or organization: Not on file     Attends meetings of clubs or organizations: Not on file     Relationship status: Not on file    Intimate partner violence     Fear of current or ex partner: Not on file     Emotionally abused: Not on file     Physically abused: Not on file     Forced sexual activity: Not on file   Other Topics Concern    Not on file   Social History Narrative    Not on file       Review of Systems:   · Constitutional: there has been no unanticipated weight loss. There's been no change in energy level, sleep pattern, or activity level. · Eyes: No visual changes or diplopia. No scleral icterus. · ENT: No Headaches, hearing loss or vertigo. No mouth sores or sore throat. · Cardiovascular: Reviewed in HPI  · Respiratory: No cough or wheezing, no sputum production. No hematemesis. · Gastrointestinal: No abdominal pain, appetite loss, blood in stools. No change in bowel or bladder habits. · Genitourinary: No dysuria, trouble voiding, or hematuria. · Musculoskeletal:  No gait disturbance, weakness or joint complaints. · Integumentary: No rash or pruritis. · Neurological: No headache, diplopia, change in muscle strength, numbness or tingling. No change in gait, balance, coordination, mood, affect, memory, mentation, behavior. · Psychiatric: No anxiety, no depression. · Endocrine: No malaise, fatigue or temperature intolerance. No excessive thirst, fluid intake, or urination. No tremor. · Hematologic/Lymphatic: No abnormal bruising or bleeding, blood clots or swollen lymph nodes. · Allergic/Immunologic: No nasal congestion or hives. Physical Examination:    Vitals:    04/02/21 0748   BP: (!) 92/56   Pulse: 88   SpO2: 93%   Weight: 206 lb 9.6 oz (93.7 kg)   Height: 5' 8\" (1.727 m)     Body mass index is 31.41 kg/m².      Wt Readings from Last 3 Encounters:   04/02/21 206 lb 9.6 oz (93.7 kg)   03/26/21 202 lb (91.6 kg)   03/23/21 196 lb 4 oz (89 kg)     BP Readings from Last 3 Encounters:   04/02/21 (!) 92/56   03/26/21 98/64   03/23/21 102/70     Constitutional and General Appearance:   Chronically ill appearing  HEENT:  NC/AT  MARIBEL  No problems with hearing  Skin:  Warm, dry  Respiratory:  · Normal excursion and expansion without use of accessory muscles  · Resp Auscultation: Normal breath sounds without dullness  Cardiovascular:  · The apical impulses not displaced  · Heart tones are crisp and normal  · Cervical veins are not engorged  · The carotid upstroke is normal in amplitude and contour without delay or bruit  · JVP less than 8 cm H2O  RRR with nl S1 and S2 without m,r,g  · Peripheral pulses are symmetrical and full  · There is no clubbing, cyanosis of the extremities. · No edema  · Femoral Arteries: 2+ and equal  · Pedal Pulses: 2+ and equal   Neck:  · No thyromegaly  Abdomen:  · No masses or tenderness  · Liver/Spleen: No Abnormalities Noted  Neurological/Psychiatric:  · Alert and oriented in all spheres  · Moves all extremities well  · Exhibits normal gait balance and coordination  · No abnormalities of mood, affect, memory, mentation, or behavior are noted      11/30/2020 Dobutamine Echo   Dobutamine echocardiogram for aortic stenosis. Very technically limited exam due to atrial fibrillation. Baseline echocardiogram shows severe left ventricular dysfunction with   anterior, lateral and apical hypokinesis with an ejection fraction of 20-25%. There is no improvement in wall motion with low or high dose dobutamine   suggestive of nonviable myocardium. Mild prosthetic aortic valve stenosis with a mean gradient of 16mmHg and   peak velocity of 2.47m/s at rest. After dobutamine stress the mean AV   gradient rises to 20mmHg with 20mcg of dobutamine consistent with mild to   moderate aortic stenosis.   Prosthetic mitral valve with a mean gradient of 7mmHg JUDE 10/21/2020  Mildly dilated left ventricular size and normal wall thickness. Global left ventricular function is severely decreased with ejection fraction estimated at 25%. Patient is in atrial fibrillation during procedure. The bioprosthetic mitral valve is well seated with a mean gradient of 5mmHg and maximum pressure gradient of 15 mmHg with heart rate of 89 bpm. Pressure half time of 82 ms. There is trivial mitral regurgitation. Left atrial enlargement. Spontaneous echo contrast seen in the left atrium. A large stump of the left atrial appendage with no thrombus noted. Patient has history of surgical ligation of the left atrial appendage. There are spontaneous echo contrast in the atrial appendage. The aortic valve is thickened/calcified with decreased leaflet mobility consistent with aortic stenosis. There is trivial aortic insufficiency. There is moderate tricuspid regurgitation. ECHO 9/15/20  Left ventricular cavity size is dilated. There is normal wall thickness with a moderately severe reduction in   systolic function. LV ejection fraction is visually estimated to be 25-30%. Indeterminate diastolic function. The right ventricle is not well visualized but appears to be normal in size with moderately reduced function. The left atrium is moderately dilated. A bioprosthetic mitral valve with a mean gradient of 7 mmHg. This may be a normal gradient for this valve but could suggest mild stenosis. Trivial mitral regurgitation. The aortic valve is thickened/calcified with a mean gradient of 16mmHg consistent with at least mild aortic stenosis. This is likely underestimated due to low cardiac output secondary to LV dysfunction. No significant aortic valve regurgitation. Moderate tricuspid regurgitation with RVSP of 48 mmHg. JUDE 11/1/2019  Normal left ventricular cavity size and wall thickness.  Global left ventricular function is moderate-to-severely decreased with ejection fraction estimated from 35% to 40%. Severe apical akinesis noted. The bioprosthetic mitral valve is well seated with a mean gradient of 2mmHg and maximum pressure gradient of 5 mmHg. There is trivial mitral regurgitation. Left atrial enlargement. Spontaneous echo contrast seen in the left atrium. Stump of the left atrial appendage noted with no thrombus. The aortic valve is thickened/calcified with decreased leaflet mobility consistent with aortic stenosis. There is trivial aortic insufficiency. Labs were reviewed including labs from other hospital systems through [facility]. Cardiac testing was reviewed including echos, nuclear scans, cardiac catheterization, including from other hospital systems through [facility]. Assessment:    1. Systolic CHF, chronic ([address]    2. Nonrheumatic mitral valve regurgitation    3. Coronary artery disease due to lipid rich plaque    4. S/P MVR (mitral valve repair)    5. S/P CABG x 3    6. PAF (paroxysmal atrial fibrillation) ([address]    7. Essential hypertension    8. Hypercholesteremia         sCHF  Stable, compensated  Dry weight is 199 lbs. Currently still feels like she has a few extra pounds on her. Taking Carvediolol 12.5 mg BID, Torsemide 100 mg once a day, Spironolactone 50 mg once a day    JUDE 10/21/20> EF 25%  ECHO 9/15/20> EF 25-30%  JUDE 11/1/2019> EF 35-40%    *Decrease Carvediolol to 6.25 mg BID  *If weight is 199-210 lbs, take 100 mg of torsemide a day. *If weight is below 199 lbs, take 70 mg of torsemide a day. *Call the office if your weight is below 195 or above 210 lbs    CAD / CABG x3 8/2018 (LIMA-LAD, SVG-D1 and Posterior  LV branch of RCA  -- Dr. Vitaly Conner)  Stable, no anginal symptoms    AS/MVR 8/2018  Stable  F/w Dr. Gold Sensing    UJDE 10/21/20> The aortic valve is thickened/calcified with decreased leaflet mobility consistent with aortic stenosis. There is trivial aortic insufficiency. Well-seated MV.  Mod TR.  ECHO 9/15/20> Mild AS, mild MR. Hypertension  Stable    PAF  HR regular & controlled  F/w EP    MAZE / LR implant 8/2018  RFCA/PVI ablation 10/2019  CV 1/2020 & 10/2020    DVT/PE, h/o  Remains on Coumadin, managed thru [facility] clinic      Plan:  1. Decrease carvedilol to 6.25 mg BID   2. Continue all other medications. 3. If weight is 199-210 lbs, take 100 mg of torsemide a day. 4. If weight is below 199 lbs, take 70 mg of torsemide a day. 5. Call the office if your weight is below 195 or above 210 lbs  3. CBC, CMP, BNP  in 1 month  2. RTC in 1 month     [address] Cristi Barnard, am scribing for and in the presence of Dr. Nayla Kendrick MD.   Cristi Way 04/02/21 8:09 AM     The scribe's documentation has been prepared under my direction and personally reviewed by me in its entirety. I confirm that the note above accurately reflects all work, treatment, procedures, and medical decision making performed by me. Time Based Itemization  A total of 30 minutes was spent on today's patient encounter. If applicable, non-patient-facing activities:  (x ) Preparing to see the patient and reviewing records  ( ) Individual interpretation of results  ( ) Discussion or coordination of care with other health care professionals  ( x) Ordering of unique tests, medications, or procedures  ( x) Documentation within the EHR        I appreciate the opportunity of cooperating in the care of this patient.     Candace Fermin M.D., [facility]

## 2021-04-01 ENCOUNTER — ANTI-COAG VISIT (OUTPATIENT)
Dept: FAMILY MEDICINE CLINIC | Age: 76
End: 2021-04-01

## 2021-04-01 ENCOUNTER — HOSPITAL ENCOUNTER (OUTPATIENT)
Age: 76
Discharge: HOME | End: 2021-04-01
Payer: MEDICARE

## 2021-04-01 DIAGNOSIS — I48.0 PAF (PAROXYSMAL ATRIAL FIBRILLATION) (HCC): ICD-10-CM

## 2021-04-01 DIAGNOSIS — I50.23 ACUTE ON CHRONIC SYSTOLIC HEART FAILURE DUE TO VALVULAR DISEASE (HCC): ICD-10-CM

## 2021-04-01 DIAGNOSIS — I38 ACUTE ON CHRONIC SYSTOLIC HEART FAILURE DUE TO VALVULAR DISEASE (HCC): ICD-10-CM

## 2021-04-01 DIAGNOSIS — R06.02 SHORTNESS OF BREATH: ICD-10-CM

## 2021-04-01 DIAGNOSIS — I35.0 NONRHEUMATIC AORTIC VALVE STENOSIS: ICD-10-CM

## 2021-04-01 DIAGNOSIS — I50.43 ACUTE ON CHRONIC COMBINED SYSTOLIC AND DIASTOLIC CONGESTIVE HEART FAILURE (HCC): ICD-10-CM

## 2021-04-01 LAB
ANION GAP SERPL CALCULATED.3IONS-SCNC: 8 MMOL/L (ref 3–16)
BUN BLDV-MCNC: 18 MG/DL (ref 7–20)
CALCIUM SERPL-MCNC: 8.5 MG/DL (ref 8.3–10.6)
CHLORIDE BLD-SCNC: 102 MMOL/L (ref 99–110)
CO2: 30 MMOL/L (ref 21–32)
CREAT SERPL-MCNC: 1.1 MG/DL (ref 0.6–1.2)
GFR AFRICAN AMERICAN: 59
GFR NON-AFRICAN AMERICAN: 49
GLUCOSE BLD-MCNC: 137 MG/DL (ref 70–99)
HCT VFR BLD CALC: 32.2 % (ref 36–48)
HEMOGLOBIN: 10.5 G/DL (ref 12–16)
INR BLD: 1.9
MCH RBC QN AUTO: 28.3 PG (ref 26–34)
MCHC RBC AUTO-ENTMCNC: 32.5 G/DL (ref 31–36)
MCV RBC AUTO: 87.1 FL (ref 80–100)
PDW BLD-RTO: 16.9 % (ref 12.4–15.4)
PLATELET # BLD: 246 K/UL (ref 135–450)
PMV BLD AUTO: 9 FL (ref 5–10.5)
POTASSIUM SERPL-SCNC: 3.7 MMOL/L (ref 3.5–5.1)
PRO-BNP: 5413 PG/ML (ref 0–449)
RBC # BLD: 3.69 M/UL (ref 4–5.2)
SODIUM BLD-SCNC: 140 MMOL/L (ref 136–145)
TSH SERPL DL<=0.05 MIU/L-ACNC: 1.9 UIU/ML (ref 0.27–4.2)
WBC # BLD: 4.6 K/UL (ref 4–11)

## 2021-04-01 PROCEDURE — 36415 COLL VENOUS BLD VENIPUNCTURE: CPT

## 2021-04-01 PROCEDURE — 85027 COMPLETE CBC AUTOMATED: CPT

## 2021-04-01 PROCEDURE — 83880 ASSAY OF NATRIURETIC PEPTIDE: CPT

## 2021-04-01 PROCEDURE — 84443 ASSAY THYROID STIM HORMONE: CPT

## 2021-04-01 PROCEDURE — 80048 BASIC METABOLIC PNL TOTAL CA: CPT

## 2021-04-02 ENCOUNTER — OFFICE VISIT (OUTPATIENT)
Dept: CARDIOLOGY CLINIC | Age: 76
End: 2021-04-02
Payer: MEDICARE

## 2021-04-02 VITALS
SYSTOLIC BLOOD PRESSURE: 92 MMHG | HEART RATE: 88 BPM | BODY MASS INDEX: 31.31 KG/M2 | HEIGHT: 68 IN | DIASTOLIC BLOOD PRESSURE: 56 MMHG | WEIGHT: 206.6 LBS | OXYGEN SATURATION: 93 %

## 2021-04-02 DIAGNOSIS — I50.22 SYSTOLIC CHF, CHRONIC (HCC): Primary | ICD-10-CM

## 2021-04-02 DIAGNOSIS — I10 ESSENTIAL HYPERTENSION: ICD-10-CM

## 2021-04-02 DIAGNOSIS — I48.0 PAF (PAROXYSMAL ATRIAL FIBRILLATION) (HCC): ICD-10-CM

## 2021-04-02 DIAGNOSIS — I34.0 NONRHEUMATIC MITRAL VALVE REGURGITATION: ICD-10-CM

## 2021-04-02 DIAGNOSIS — Z95.1 S/P CABG X 3: ICD-10-CM

## 2021-04-02 DIAGNOSIS — E78.00 HYPERCHOLESTEREMIA: ICD-10-CM

## 2021-04-02 DIAGNOSIS — I25.83 CORONARY ARTERY DISEASE DUE TO LIPID RICH PLAQUE: ICD-10-CM

## 2021-04-02 DIAGNOSIS — I25.10 CORONARY ARTERY DISEASE DUE TO LIPID RICH PLAQUE: ICD-10-CM

## 2021-04-02 DIAGNOSIS — Z98.890 S/P MVR (MITRAL VALVE REPAIR): ICD-10-CM

## 2021-04-02 PROCEDURE — 99214 OFFICE O/P EST MOD 30 MIN: CPT | Performed by: INTERNAL MEDICINE

## 2021-04-02 RX ORDER — CARVEDILOL 6.25 MG/1
6.25 TABLET ORAL DAILY
Qty: 180 TABLET | Refills: 3 | Status: ON HOLD
Start: 2021-04-02 | End: 2021-05-04 | Stop reason: HOSPADM

## 2021-04-02 NOTE — PATIENT INSTRUCTIONS
Decrease carvedilol to 6.25 mg twice a day. Continue all other medications. *If weight is 199-210 lbs, take 100 mg of torsemide a day. *If weight is below 199 lbs, take 70 mg of torsemide a day.   *Call the office if your weight is below 195 or above 210 lbs  *Have labs drawn in 1 month

## 2021-04-05 ENCOUNTER — NURSE ONLY (OUTPATIENT)
Dept: CARDIOLOGY CLINIC | Age: 76
End: 2021-04-05
Payer: MEDICARE

## 2021-04-05 DIAGNOSIS — Z45.09 ENCOUNTER FOR ELECTRONIC ANALYSIS OF REVEAL EVENT RECORDER: ICD-10-CM

## 2021-04-05 DIAGNOSIS — I48.19 PERSISTENT ATRIAL FIBRILLATION (HCC): ICD-10-CM

## 2021-04-05 PROCEDURE — 93298 REM INTERROG DEV EVAL SCRMS: CPT | Performed by: INTERNAL MEDICINE

## 2021-04-05 PROCEDURE — G2066 INTER DEVC REMOTE 30D: HCPCS | Performed by: INTERNAL MEDICINE

## 2021-04-05 NOTE — LETTER
[address] [employer] [phone]  [address] [facility],[address] [phone]    Pacemaker/Defibrillator Clinic          04/05/21        Rainer Delgado  [address] [address]        Dear Rainer Delgado    This letter is to inform you that we received the transmission from your monitor at home that checks your implanted heart device. The next date your monitor will automatically transmit will be 5-10-21. If your report needs attention we will notify you. Your device and monitor are wireless and most transmit cellularly, but please periodically check your monitor is still plugged in to the electrical outlet. If you still use the telephone land line to send please ensure the connection to the phone abel is secure. This will help to ensure successful automatic transmissions in the future. Also, the monitor needs to be close to you while sleeping at night. Please be aware that the remote device transmission sites are periodically monitored only during regular business hours during which simultaneous in-office device clinics are being run. If your transmission requires attention, we will contact you as soon as possible. Thank you.             [facility]

## 2021-04-08 LAB — INR BLD: 1.9

## 2021-04-10 NOTE — RESULT ENCOUNTER NOTE
Afib with relative rate control. Needs office visit with EP for follow up of cardiomyopathy and possible AICD.

## 2021-04-12 DIAGNOSIS — I38 ACUTE ON CHRONIC SYSTOLIC HEART FAILURE DUE TO VALVULAR DISEASE (HCC): ICD-10-CM

## 2021-04-12 DIAGNOSIS — I50.23 ACUTE ON CHRONIC SYSTOLIC HEART FAILURE DUE TO VALVULAR DISEASE (HCC): ICD-10-CM

## 2021-04-12 RX ORDER — TORSEMIDE 20 MG/1
TABLET ORAL
Qty: 120 TABLET | Refills: 3 | Status: ON HOLD | OUTPATIENT
Start: 2021-04-12 | End: 2021-05-04 | Stop reason: HOSPADM

## 2021-04-12 NOTE — TELEPHONE ENCOUNTER
Medication Refill    Medication needing refilled:torsemide (DEMADEX) 20 MG tablet , spironolactone (ALDACTONE) 25 MG tablet  Dosage of the medication:    How are you taking this medication (QD, BID, TID, QID, PRN):    30 or 90 day supply called in:    When will you run out of your medication:    Which Pharmacy are we sending the medication to?: [address], [address] [place] [address] [place] - F [phone]

## 2021-04-13 ENCOUNTER — TELEPHONE (OUTPATIENT)
Dept: CARDIOLOGY CLINIC | Age: 76
End: 2021-04-13

## 2021-04-13 ENCOUNTER — ANTI-COAG VISIT (OUTPATIENT)
Dept: FAMILY MEDICINE CLINIC | Age: 76
End: 2021-04-13

## 2021-04-13 ENCOUNTER — OFFICE VISIT (OUTPATIENT)
Dept: SURGERY | Age: 76
End: 2021-04-13
Payer: MEDICARE

## 2021-04-13 VITALS — BODY MASS INDEX: 31.17 KG/M2 | DIASTOLIC BLOOD PRESSURE: 60 MMHG | WEIGHT: 205 LBS | SYSTOLIC BLOOD PRESSURE: 98 MMHG

## 2021-04-13 DIAGNOSIS — R10.13 EPIGASTRIC PAIN: Primary | ICD-10-CM

## 2021-04-13 PROCEDURE — 99213 OFFICE O/P EST LOW 20 MIN: CPT | Performed by: SURGERY

## 2021-04-13 RX ORDER — FLUCONAZOLE 100 MG/1
100 TABLET ORAL DAILY
Qty: 7 TABLET | Refills: 0 | Status: ON HOLD | OUTPATIENT
Start: 2021-04-13 | End: 2021-05-04 | Stop reason: HOSPADM

## 2021-04-13 RX ORDER — SPIRONOLACTONE 25 MG/1
50 TABLET ORAL DAILY
Qty: 180 TABLET | Refills: 0 | Status: ON HOLD | OUTPATIENT
Start: 2021-04-13 | End: 2021-05-04 | Stop reason: HOSPADM

## 2021-04-13 RX ORDER — AMOXICILLIN AND CLAVULANATE POTASSIUM 875; 125 MG/1; MG/1
1 TABLET, FILM COATED ORAL 2 TIMES DAILY
Qty: 20 TABLET | Refills: 0 | Status: ON HOLD | OUTPATIENT
Start: 2021-04-13 | End: 2021-05-04 | Stop reason: HOSPADM

## 2021-04-13 NOTE — TELEPHONE ENCOUNTER
----- Message from Marci Aly MD sent at 4/9/2021  8:55 PM EDT -----  Afib with relative rate control. Needs office visit with EP for follow up of cardiomyopathy and possible AICD.

## 2021-04-14 NOTE — PROGRESS NOTES
[facility]  SUBJECTIVE:    Chief Complaint: abdominal pain    Anne Edwards   1945   77 y.o. female presents with acute on chronic crampy abdominal and back pain. The back pain is positional and worse when laying down and improved when upright. Nausea and occasional vomiting in the evening but tolerating breakfast and lunch. Bowel movements normal. No other complaints. Multiple admissions and evaluated for acute/chronic pneumatosis of small bowel and loculated pneumoperitoneum. She was recently admitted a month ago and at discharge was without abdominal pain and tolerating diet. She does feel worse than at discharge but not bad enough to need ED evaluation.  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 Gagan Warner - brushings from [address] [place] [address]  08/16/2018    Dr. Hai Yancey  02/07/2017    Dr. Faisal Dunn - sigmoid diverticulosis, polypectomies x3    COLONOSCOPY  01/17/2014    Dr. Faisal Dunn - sigmoid diverticulosis, polypectomies x3, internal hemorrhoids    COLONOSCOPY  10/10/2018    w/biopsy performed by Dilip Hansen MD at [address] [facility] N/A 8/7/2020    COLONOSCOPY DIAGNOSTIC performed by Gagan Warner MD at [address]  08/21/2018    Dr. Elva Pisano - x3 (LIMA-LAD, L SV-D1-PLV) modified GEOVANNI ALEXANDER procedure w/obliteration of WAYNE using 45mm AtriClip    HYSTERECTOMY      INSERTABLE CARDIAC MONITOR Left 08/16/2018    Dr. Ann-Marie James - Kenan Azevedo # [id number] Medtronic    MITRAL VALVE REPLACEMENT  08/21/2018    Dr. Filomena Dinh - 27mm Medtronic Cinch tissue valve    PAIN MANAGEMENT PROCEDURE N/A 12/18/2020    C6-C7 MIDLINE  EPIDURAL STEROID INJECTION WITH FLUOROSCOPY performed by Edson Piña MD at [address] Bilateral 1/18/2021    BILATERAL T11 TRANSFORAMINAL EPIDURAL STEROID INJECTION WITH FLUOROSCOPY performed by Edson Piña MD at [address] TRANSESOPHAGEAL ECHOCARDIOGRAM  08/21/2018    during CABG/MVR    TUNNELED VENOUS CATHETER PLACEMENT Left 08/23/2018    Dr. Rainer Ventura -  for HD---since removed    UPPER GASTROINTESTINAL ENDOSCOPY N/A 10/10/2018    w/biopsy performed by Toi Johns MD at [address] , [facility] Marital status:      Spouse name: Not on file    Number of children: Not on file    Years of education: Not on file    Highest education level: Not on file   Occupational History    Not on file   Social Needs    Financial resource strain: Not on file    Food insecurity     Worry: Not on file     Inability: Not on file    Transportation needs     Medical: Not on file     Non-medical: Not on file   Tobacco Use    Smoking status: Never Smoker    Smokeless tobacco: Never Used   Substance and Sexual Activity    Alcohol use: No    Drug use: No    Sexual activity: Not on file   Lifestyle    Physical activity     Days per week: Not on file     Minutes per session: Not on file    Stress: Not on file   Relationships    Social connections     Talks on phone: Not on file     Gets together: Not on file     Attends [employer] service: Not on file     Active member of club or organization: Not on file     Attends meetings of clubs or organizations: Not on file     Relationship status: Not on file  Intimate partner violence     Fear of current or ex partner: Not on file     Emotionally abused: Not on file     Physically abused: Not on file     Forced sexual activity: Not on file   Other Topics Concern    Not on file   Social History Narrative    Not on file      Family History   Problem Relation Age of Onset    Cancer Father     Asthma Mother     Hypertension Mother     Heart Disease Mother     High Blood Pressure Mother      Current Outpatient Medications   Medication Sig Dispense Refill    spironolactone (ALDACTONE) 25 MG tablet Take 2 tablets by mouth daily 180 tablet 0    amoxicillin-clavulanate (AUGMENTIN) 875-125 MG per tablet Take 1 tablet by mouth 2 times daily for 10 days 20 tablet 0    torsemide (DEMADEX) 20 MG tablet 40mg morning, 40mg afternoon, 20mg evening 120 tablet 3    carvedilol (COREG) 6.25 MG tablet Take 1 tablet by mouth daily 180 tablet 3    oxyCODONE (ROXICODONE) 5 MG immediate release tablet Take 1 tablet by mouth every 6 hours as needed for Pain for up to 30 days. 100 tablet 0    gabapentin (NEURONTIN) 300 MG capsule Take 1 capsule by mouth nightly for 90 days. Intended supply: 30 days 90 capsule 0    insulin glargine (LANTUS SOLOSTAR) 100 UNIT/ML injection pen INJECT 26 UNITS IN THE MORNING AND 18 UNITS AT BEDTIME 90 mL 1    exenatide (BYETTA 10 MCG PEN) 10 MCG/0.04ML injection Inject 0.04 mLs into the skin 2 times daily (with meals) 3 pen 1    OXYCODONE HCL PO Take 10 mg by mouth As of 1/21/2021,  states patient is taking 10mg with edge being taken off her pain after 3 hours.       ACETAMINOPHEN PO Take 500 mg by mouth every 6 hours as needed       blood glucose test strips (FREESTYLE LITE) strip USE TO TEST FOUR TIMES A  strip 4    albuterol sulfate  (90 Base) MCG/ACT inhaler USE 2 INHALATIONS EVERY 6 HOURS AS NEEDED FOR WHEEZING 25.5 g 2    potassium chloride (KLOR-CON M) 10 MEQ extended release tablet TAKE 1 TABLET DAILY 90 tablet 1    albuterol (PROVENTIL) (2.5 MG/3ML) 0.083% nebulizer solution Take 3 mLs by nebulization every 6 hours as needed for Wheezing 120 each 3    polyethylene glycol (GLYCOLAX) 17 GM/SCOOP powder Take 17 g by mouth every other day       omeprazole (PRILOSEC) 20 MG delayed release capsule Take 20 mg by mouth daily      FreeStyle Lancets MISC 1 each by Does not apply route 4 times daily 200 each 5    ondansetron (ZOFRAN) 4 MG tablet Take 1 tablet by mouth 3 times daily as needed for Nausea or Vomiting 30 tablet 0    warfarin (COUMADIN) 5 MG tablet TAKE 1 TABLET DAILY (Patient taking differently: Take 5 mg by mouth daily Managed by PCP) 100 tablet 4    montelukast (SINGULAIR) 10 MG tablet TAKE 1 TABLET NIGHTLY 90 tablet 4    Blood Glucose Monitoring Suppl ([employer] PRO GLUCOSE METER) GAETANO 1 Device by Does not apply route 4 times daily 1 Device 0    HUMALOG KWIKPEN 100 UNIT/ML SOPN TAKE AS INSTRUCTED BY YOUR PRESCRIBER (Patient taking differently: Only if high blood sugar-has not been using) 15 mL 8    nystatin (MYCOSTATIN) 505864 UNIT/ML suspension TAKE ONE TEASPOONFUL BY MOUTH FOUR TIMES A DAY FOR 5 DAYS 1 Bottle 2    aspirin 81 MG chewable tablet Take 1 tablet by mouth daily 30 tablet 3    BD PEN NEEDLE JANNET U/F 32G X 4 MM MISC USE 1 PEN NEEDLE FOUR TIMES A  each 3    vitamin B-12 500 MCG tablet Take 1 tablet by mouth daily 30 tablet 3    fluconazole (DIFLUCAN) 100 MG tablet Take 1 tablet by mouth daily for 7 days Take 5 mg coumadin when taking diflucan 7 tablet 0     No current facility-administered medications for this visit.        Allergies   Allergen Reactions    Rfbagiin-Alvvoct-Pgyxdv [Fluocinolone] Shortness Of Breath    Ciprofloxacin Shortness Of Breath    Diovan [Valsartan] Shortness Of Breath    Flagyl [Metronidazole] Shortness Of Breath     Has taken diflucan at home 12/7/15    Metformin And Related [Metformin And Related] Shortness Of Breath    Benazepril      Other reaction(s): Not Recorded    Morphine      Bad reaction. \"makes her feel horrible\".  Saxagliptin      Other reaction(s): Not Recorded    Levaquin [Levofloxacin] Rash        Review of Systems:  Review of systems performed and negative with the exception of the above findings    OBJECTIVE:  BP 98/60   Wt 205 lb (93 kg)   BMI 31.17 kg/m²      Physical Exam:  General appearance: alert, appears stated age, cooperative and no distress  Head: Normocephalic, without obvious abnormality, atraumatic  Lungs: clear to auscultation bilaterally  Heart: regular rate and rhythm, S1, S2 normal, no murmur, click, rub or gallop  Abdomen: soft, moderate distension (stable), mild epigastric tenderness without peritonitis (stable)    Anti-coag visit on 04/13/2021   Component Date Value Ref Range Status    INR 04/08/2021 1.90   Final   Hospital Outpatient Visit on 04/01/2021   Component Date Value Ref Range Status    Sodium 04/01/2021 140  136 - 145 mmol/L Final    Potassium 04/01/2021 3.7  3.5 - 5.1 mmol/L Final    Chloride 04/01/2021 102  99 - 110 mmol/L Final    CO2 04/01/2021 30  21 - 32 mmol/L Final    Anion Gap 04/01/2021 8  3 - 16 Final    Glucose 04/01/2021 137* 70 - 99 mg/dL Final    BUN 04/01/2021 18  7 - 20 mg/dL Final    CREATININE 04/01/2021 1.1  0.6 - 1.2 mg/dL Final    GFR Non- 04/01/2021 49* >60 Final    Comment: >60 mL/min/1.73m2 EGFR, calc. for ages 25 and older using the  MDRD formula (not corrected for weight), is valid for stable  renal function.  GFR  04/01/2021 59* >60 Final    Comment: Chronic Kidney Disease: less than 60 ml/min/1.73 sq.m. Kidney Failure: less than 15 ml/min/1.73 sq.m. Results valid for patients 18 years and older.       Calcium 04/01/2021 8.5  8.3 - 10.6 mg/dL Final    TSH 04/01/2021 1.90  0.27 - 4.20 uIU/mL Final    WBC 04/01/2021 4.6  4.0 - 11.0 K/uL Final    RBC 04/01/2021 3.69* 4.00 - 5.20 M/uL Final    Hemoglobin 04/01/2021 10.5* 12.0 - 16.0 g/dL Final    Hematocrit 04/01/2021 32.2* 36.0 - 48.0 % Final    MCV 04/01/2021 87.1  80.0 - 100.0 fL Final    MCH 04/01/2021 28.3  26.0 - 34.0 pg Final    MCHC 04/01/2021 32.5  31.0 - 36.0 g/dL Final    RDW 04/01/2021 16.9* 12.4 - 15.4 % Final    Platelets 62/39/1622 246  135 - 450 K/uL Final    MPV 04/01/2021 9.0  5.0 - 10.5 fL Final    Pro-BNP 04/01/2021 5,413* 0 - 449 pg/mL Final    Comment: Methodology by NT-proBNP    An age-independent cutoff point of 300 pg/ml has a 98%  negative predictive value excluding acute heart failure. Values exceeding the age-related cutoff values (450 pg/mL if  age<50, 900 if 50-75 and 1800 if >75) has 90% sensitivity and  84% specificity for diagnosing acute HF. In patients with  renal compromise (eGFR<60) values greater than 1200pg/ml have  a diagnostic sensitivity and specificity of 89% and 72% for  acute HF. Anti-coag visit on 04/01/2021   Component Date Value Ref Range Status    INR 04/01/2021 1.90   Final   Hospital Outpatient Visit on 03/26/2021   Component Date Value Ref Range Status    Pro-BNP 03/26/2021 7,284* 0 - 449 pg/mL Final    Comment: Methodology by NT-proBNP    An age-independent cutoff point of 300 pg/ml has a 98%  negative predictive value excluding acute heart failure. Values exceeding the age-related cutoff values (450 pg/mL if  age<50, 900 if 50-75 and 1800 if >75) has 90% sensitivity and  84% specificity for diagnosing acute HF. In patients with  renal compromise (eGFR<60) values greater than 1200pg/ml have  a diagnostic sensitivity and specificity of 89% and 72% for  acute HF.       Sodium 03/26/2021 131* 136 - 145 mmol/L Final    Potassium 03/26/2021 3.6  3.5 - 5.1 mmol/L Final    Chloride 03/26/2021 93* 99 - 110 mmol/L Final    CO2 03/26/2021 29  21 - 32 mmol/L Final    Anion Gap 03/26/2021 9  3 - 16 Final    Glucose 03/26/2021 118* 70 - 99 mg/dL Final    BUN 03/26/2021 38* 7 - 20 mg/dL Final    CREATININE 03/26/2021 1.6* 0.6 - 1.2 mg/dL Final    GFR Non- 03/26/2021 31* >60 Final    Comment: >60 mL/min/1.73m2 EGFR, calc. for ages 25 and older using the  MDRD formula (not corrected for weight), is valid for stable  renal function.  GFR  03/26/2021 38* >60 Final    Comment: Chronic Kidney Disease: less than 60 ml/min/1.73 sq.m. Kidney Failure: less than 15 ml/min/1.73 sq.m. Results valid for patients 18 years and older.  Calcium 03/26/2021 8.5  8.3 - 10.6 mg/dL Final   Office Visit on 03/23/2021   Component Date Value Ref Range Status    INR 03/23/2021 4.7   Final   Hospital Outpatient Visit on 03/16/2021   Component Date Value Ref Range Status    Sodium 03/16/2021 139  136 - 145 mmol/L Final    Potassium 03/16/2021 4.0  3.5 - 5.1 mmol/L Final    Chloride 03/16/2021 103  99 - 110 mmol/L Final    CO2 03/16/2021 26  21 - 32 mmol/L Final    Anion Gap 03/16/2021 10  3 - 16 Final    Glucose 03/16/2021 146* 70 - 99 mg/dL Final    BUN 03/16/2021 19  7 - 20 mg/dL Final    CREATININE 03/16/2021 1.1  0.6 - 1.2 mg/dL Final    GFR Non- 03/16/2021 49* >60 Final    Comment: >60 mL/min/1.73m2 EGFR, calc. for ages 25 and older using the  MDRD formula (not corrected for weight), is valid for stable  renal function.  GFR  03/16/2021 59* >60 Final    Comment: Chronic Kidney Disease: less than 60 ml/min/1.73 sq.m. Kidney Failure: less than 15 ml/min/1.73 sq.m. Results valid for patients 18 years and older.  Calcium 03/16/2021 8.2* 8.3 - 10.6 mg/dL Final    Pro-BNP 03/16/2021 4,312* 0 - 449 pg/mL Final    Comment: Methodology by NT-proBNP    An age-independent cutoff point of 300 pg/ml has a 98%  negative predictive value excluding acute heart failure. Values exceeding the age-related cutoff values (450 pg/mL if  age<50, 900 if 50-75 and 1800 if >75) has 90% sensitivity and  84% specificity for diagnosing acute HF.  In patients with  renal compromise (eGFR<60) values greater than 1200pg/ml have  a diagnostic sensitivity and specificity of 89% and 72% for  acute HF.  Hemoglobin A1C 03/16/2021 6.6  See comment % Final    Comment: Comment:  Diagnosis of Diabetes: > or = 6.5%  Increased risk of diabetes (Prediabetes): 5.7-6.4%  Glycemic Control: Nonpregnant Adults: <7.0%                    Pregnant: <6.0%        eAG 03/16/2021 142.7  mg/dL Final   Anti-coag visit on 03/15/2021   Component Date Value Ref Range Status    INR 03/15/2021 1.30   Final   No results displayed because visit has over 200 results. No results found. ASSESSMENT:  Abdominal pain    PLAN:  Will trial PO antibiotics as this helped during last admission. Counseled on criteria for ED evaluation/admission are uncontrolled pain, inability to maintain hydration and nutrition, inability to perform activities of daily living. Return to office at latest 2 weeks    Tushar Moctezuma MD, FACS  4/13/2021  9:03 PM

## 2021-04-14 NOTE — PATIENT INSTRUCTIONS
Will trial PO antibiotics as this helped during last admission. Counseled on criteria for ED evaluation/admission are uncontrolled pain, inability to maintain hydration and nutrition, inability to perform activities of daily living.  Return to office at latest 2 weeks

## 2021-04-15 ENCOUNTER — TELEPHONE (OUTPATIENT)
Dept: CARDIOLOGY CLINIC | Age: 76
End: 2021-04-15

## 2021-04-15 ENCOUNTER — APPOINTMENT (OUTPATIENT)
Dept: GENERAL RADIOLOGY | Age: 76
DRG: 286 | End: 2021-04-15
Payer: MEDICARE

## 2021-04-15 ENCOUNTER — HOSPITAL ENCOUNTER (INPATIENT)
Age: 76
LOS: 20 days | Discharge: HOME HEALTH | DRG: 286 | End: 2021-05-05
Attending: INTERNAL MEDICINE | Admitting: INTERNAL MEDICINE
Payer: MEDICARE

## 2021-04-15 DIAGNOSIS — Z79.01 WARFARIN ANTICOAGULATION: ICD-10-CM

## 2021-04-15 DIAGNOSIS — R06.09 DYSPNEA ON EXERTION: ICD-10-CM

## 2021-04-15 DIAGNOSIS — I48.91 ATRIAL FIBRILLATION WITH RAPID VENTRICULAR RESPONSE (HCC): Primary | ICD-10-CM

## 2021-04-15 DIAGNOSIS — R07.9 CHEST PAIN, UNSPECIFIED TYPE: ICD-10-CM

## 2021-04-15 LAB
A/G RATIO: 0.8 (ref 1.1–2.2)
ALBUMIN SERPL-MCNC: 2.7 G/DL (ref 3.4–5)
ALBUMIN SERPL-MCNC: 3.2 G/DL (ref 3.4–5)
ALP BLD-CCNC: 215 U/L (ref 40–129)
ALP BLD-CCNC: 242 U/L (ref 40–129)
ALT SERPL-CCNC: 10 U/L (ref 10–40)
ALT SERPL-CCNC: 11 U/L (ref 10–40)
ANION GAP SERPL CALCULATED.3IONS-SCNC: 10 MMOL/L (ref 3–16)
AST SERPL-CCNC: 15 U/L (ref 15–37)
AST SERPL-CCNC: 16 U/L (ref 15–37)
BASE EXCESS VENOUS: 6.5 MMOL/L (ref -3–3)
BASOPHILS ABSOLUTE: 0 K/UL (ref 0–0.2)
BASOPHILS RELATIVE PERCENT: 0.7 %
BILIRUB SERPL-MCNC: 0.6 MG/DL (ref 0–1)
BILIRUB SERPL-MCNC: 0.7 MG/DL (ref 0–1)
BILIRUBIN DIRECT: <0.2 MG/DL (ref 0–0.3)
BILIRUBIN URINE: NEGATIVE
BILIRUBIN, INDIRECT: ABNORMAL MG/DL (ref 0–1)
BLOOD, URINE: ABNORMAL
BUN BLDV-MCNC: 22 MG/DL (ref 7–20)
CALCIUM SERPL-MCNC: 9 MG/DL (ref 8.3–10.6)
CARBOXYHEMOGLOBIN: 3.2 % (ref 0–1.5)
CHLORIDE BLD-SCNC: 102 MMOL/L (ref 99–110)
CLARITY: CLEAR
CO2: 29 MMOL/L (ref 21–32)
COLOR: YELLOW
CREAT SERPL-MCNC: 1.1 MG/DL (ref 0.6–1.2)
EOSINOPHILS ABSOLUTE: 0.1 K/UL (ref 0–0.6)
EOSINOPHILS RELATIVE PERCENT: 2.5 %
EPITHELIAL CELLS, UA: 2 /HPF (ref 0–5)
GFR AFRICAN AMERICAN: 59
GFR NON-AFRICAN AMERICAN: 49
GLOBULIN: 3.9 G/DL
GLUCOSE BLD-MCNC: 119 MG/DL (ref 70–99)
GLUCOSE BLD-MCNC: 138 MG/DL (ref 70–99)
GLUCOSE BLD-MCNC: 143 MG/DL (ref 70–99)
GLUCOSE BLD-MCNC: 184 MG/DL (ref 70–99)
GLUCOSE URINE: NEGATIVE MG/DL
HCO3 VENOUS: 30.3 MMOL/L (ref 23–29)
HCT VFR BLD CALC: 33.5 % (ref 36–48)
HEMOGLOBIN: 10.6 G/DL (ref 12–16)
HYALINE CASTS: 4 /LPF (ref 0–8)
INR BLD: 1.99 (ref 0.86–1.14)
KETONES, URINE: NEGATIVE MG/DL
LACTIC ACID, SEPSIS: 1 MMOL/L (ref 0.4–1.9)
LEUKOCYTE ESTERASE, URINE: NEGATIVE
LYMPHOCYTES ABSOLUTE: 0.5 K/UL (ref 1–5.1)
LYMPHOCYTES RELATIVE PERCENT: 9.7 %
MAGNESIUM: 2.1 MG/DL (ref 1.8–2.4)
MCH RBC QN AUTO: 27 PG (ref 26–34)
MCHC RBC AUTO-ENTMCNC: 31.7 G/DL (ref 31–36)
MCV RBC AUTO: 85.2 FL (ref 80–100)
METHEMOGLOBIN VENOUS: 0.3 %
MICROSCOPIC EXAMINATION: YES
MONOCYTES ABSOLUTE: 0.5 K/UL (ref 0–1.3)
MONOCYTES RELATIVE PERCENT: 9.8 %
NEUTROPHILS ABSOLUTE: 4.3 K/UL (ref 1.7–7.7)
NEUTROPHILS RELATIVE PERCENT: 77.3 %
NITRITE, URINE: NEGATIVE
O2 CONTENT, VEN: 15 VOL %
O2 SAT, VEN: 100 %
O2 THERAPY: ABNORMAL
PCO2, VEN: 39.3 MMHG (ref 40–50)
PDW BLD-RTO: 17.2 % (ref 12.4–15.4)
PERFORMED ON: ABNORMAL
PH UA: 6.5 (ref 5–8)
PH VENOUS: 7.5 (ref 7.35–7.45)
PLATELET # BLD: 281 K/UL (ref 135–450)
PMV BLD AUTO: 8.7 FL (ref 5–10.5)
PO2, VEN: 172 MMHG (ref 25–40)
POTASSIUM REFLEX MAGNESIUM: 3.4 MMOL/L (ref 3.5–5.1)
PRO-BNP: 8766 PG/ML (ref 0–449)
PROTEIN UA: NEGATIVE MG/DL
PROTHROMBIN TIME: 23.2 SEC (ref 10–13.2)
RBC # BLD: 3.93 M/UL (ref 4–5.2)
RBC UA: 2 /HPF (ref 0–4)
SODIUM BLD-SCNC: 141 MMOL/L (ref 136–145)
SPECIFIC GRAVITY UA: 1.02 (ref 1–1.03)
TCO2 CALC VENOUS: 71 MMOL/L
TOTAL PROTEIN: 6.6 G/DL (ref 6.4–8.2)
TOTAL PROTEIN: 7.1 G/DL (ref 6.4–8.2)
TROPONIN: 0.02 NG/ML
TROPONIN: 0.02 NG/ML
URINE REFLEX TO CULTURE: ABNORMAL
URINE TYPE: ABNORMAL
UROBILINOGEN, URINE: 0.2 E.U./DL
WBC # BLD: 5.6 K/UL (ref 4–11)
WBC UA: 3 /HPF (ref 0–5)

## 2021-04-15 PROCEDURE — 6370000000 HC RX 637 (ALT 250 FOR IP): Performed by: INTERNAL MEDICINE

## 2021-04-15 PROCEDURE — 6370000000 HC RX 637 (ALT 250 FOR IP): Performed by: PHYSICIAN ASSISTANT

## 2021-04-15 PROCEDURE — 85610 PROTHROMBIN TIME: CPT

## 2021-04-15 PROCEDURE — 71045 X-RAY EXAM CHEST 1 VIEW: CPT

## 2021-04-15 PROCEDURE — 85025 COMPLETE CBC W/AUTO DIFF WBC: CPT

## 2021-04-15 PROCEDURE — 6360000002 HC RX W HCPCS: Performed by: INTERNAL MEDICINE

## 2021-04-15 PROCEDURE — 83735 ASSAY OF MAGNESIUM: CPT

## 2021-04-15 PROCEDURE — 83605 ASSAY OF LACTIC ACID: CPT

## 2021-04-15 PROCEDURE — 83880 ASSAY OF NATRIURETIC PEPTIDE: CPT

## 2021-04-15 PROCEDURE — 81001 URINALYSIS AUTO W/SCOPE: CPT

## 2021-04-15 PROCEDURE — 2580000003 HC RX 258: Performed by: PHYSICIAN ASSISTANT

## 2021-04-15 PROCEDURE — 36415 COLL VENOUS BLD VENIPUNCTURE: CPT

## 2021-04-15 PROCEDURE — 84484 ASSAY OF TROPONIN QUANT: CPT

## 2021-04-15 PROCEDURE — 2500000003 HC RX 250 WO HCPCS: Performed by: PHYSICIAN ASSISTANT

## 2021-04-15 PROCEDURE — 96374 THER/PROPH/DIAG INJ IV PUSH: CPT

## 2021-04-15 PROCEDURE — 84443 ASSAY THYROID STIM HORMONE: CPT

## 2021-04-15 PROCEDURE — 2580000003 HC RX 258: Performed by: INTERNAL MEDICINE

## 2021-04-15 PROCEDURE — 80053 COMPREHEN METABOLIC PANEL: CPT

## 2021-04-15 PROCEDURE — 99284 EMERGENCY DEPT VISIT MOD MDM: CPT

## 2021-04-15 PROCEDURE — 2060000000 HC ICU INTERMEDIATE R&B

## 2021-04-15 PROCEDURE — 93005 ELECTROCARDIOGRAM TRACING: CPT | Performed by: PHYSICIAN ASSISTANT

## 2021-04-15 PROCEDURE — 99223 1ST HOSP IP/OBS HIGH 75: CPT | Performed by: INTERNAL MEDICINE

## 2021-04-15 PROCEDURE — 82803 BLOOD GASES ANY COMBINATION: CPT

## 2021-04-15 RX ORDER — POTASSIUM CHLORIDE 20 MEQ/1
20 TABLET, EXTENDED RELEASE ORAL ONCE
Status: COMPLETED | OUTPATIENT
Start: 2021-04-15 | End: 2021-04-15

## 2021-04-15 RX ORDER — POLYETHYLENE GLYCOL 3350 17 G/17G
17 POWDER, FOR SOLUTION ORAL DAILY PRN
Status: DISCONTINUED | OUTPATIENT
Start: 2021-04-15 | End: 2021-05-05 | Stop reason: HOSPADM

## 2021-04-15 RX ORDER — CARVEDILOL 6.25 MG/1
6.25 TABLET ORAL DAILY
Status: DISCONTINUED | OUTPATIENT
Start: 2021-04-16 | End: 2021-04-16

## 2021-04-15 RX ORDER — DEXTROSE MONOHYDRATE 25 G/50ML
12.5 INJECTION, SOLUTION INTRAVENOUS PRN
Status: DISCONTINUED | OUTPATIENT
Start: 2021-04-15 | End: 2021-05-05 | Stop reason: HOSPADM

## 2021-04-15 RX ORDER — MONTELUKAST SODIUM 10 MG/1
10 TABLET ORAL NIGHTLY
Status: DISCONTINUED | OUTPATIENT
Start: 2021-04-15 | End: 2021-05-05 | Stop reason: HOSPADM

## 2021-04-15 RX ORDER — SODIUM CHLORIDE 0.9 % (FLUSH) 0.9 %
5-40 SYRINGE (ML) INJECTION EVERY 12 HOURS SCHEDULED
Status: DISCONTINUED | OUTPATIENT
Start: 2021-04-15 | End: 2021-05-05 | Stop reason: HOSPADM

## 2021-04-15 RX ORDER — ACETAMINOPHEN 650 MG/1
650 SUPPOSITORY RECTAL EVERY 6 HOURS PRN
Status: DISCONTINUED | OUTPATIENT
Start: 2021-04-15 | End: 2021-05-05 | Stop reason: HOSPADM

## 2021-04-15 RX ORDER — DILTIAZEM HYDROCHLORIDE 5 MG/ML
10 INJECTION INTRAVENOUS ONCE
Status: COMPLETED | OUTPATIENT
Start: 2021-04-15 | End: 2021-04-15

## 2021-04-15 RX ORDER — NICOTINE POLACRILEX 4 MG
15 LOZENGE BUCCAL PRN
Status: DISCONTINUED | OUTPATIENT
Start: 2021-04-15 | End: 2021-05-05 | Stop reason: HOSPADM

## 2021-04-15 RX ORDER — DEXTROSE MONOHYDRATE 50 MG/ML
100 INJECTION, SOLUTION INTRAVENOUS PRN
Status: DISCONTINUED | OUTPATIENT
Start: 2021-04-15 | End: 2021-05-05 | Stop reason: HOSPADM

## 2021-04-15 RX ORDER — ALBUTEROL SULFATE 2.5 MG/3ML
2.5 SOLUTION RESPIRATORY (INHALATION) EVERY 6 HOURS PRN
Status: DISCONTINUED | OUTPATIENT
Start: 2021-04-15 | End: 2021-05-05 | Stop reason: HOSPADM

## 2021-04-15 RX ORDER — FUROSEMIDE 10 MG/ML
20 INJECTION INTRAMUSCULAR; INTRAVENOUS ONCE
Status: COMPLETED | OUTPATIENT
Start: 2021-04-15 | End: 2021-04-15

## 2021-04-15 RX ORDER — GABAPENTIN 300 MG/1
300 CAPSULE ORAL NIGHTLY
Status: DISCONTINUED | OUTPATIENT
Start: 2021-04-15 | End: 2021-05-05 | Stop reason: HOSPADM

## 2021-04-15 RX ORDER — SODIUM CHLORIDE 9 MG/ML
25 INJECTION, SOLUTION INTRAVENOUS PRN
Status: DISCONTINUED | OUTPATIENT
Start: 2021-04-15 | End: 2021-05-03

## 2021-04-15 RX ORDER — ACETAMINOPHEN 325 MG/1
650 TABLET ORAL EVERY 6 HOURS PRN
Status: DISCONTINUED | OUTPATIENT
Start: 2021-04-15 | End: 2021-05-05 | Stop reason: HOSPADM

## 2021-04-15 RX ORDER — INSULIN LISPRO 100 [IU]/ML
0-6 INJECTION, SOLUTION INTRAVENOUS; SUBCUTANEOUS NIGHTLY
Status: DISCONTINUED | OUTPATIENT
Start: 2021-04-15 | End: 2021-05-05 | Stop reason: HOSPADM

## 2021-04-15 RX ORDER — ASPIRIN 81 MG/1
324 TABLET, CHEWABLE ORAL ONCE
Status: COMPLETED | OUTPATIENT
Start: 2021-04-15 | End: 2021-04-15

## 2021-04-15 RX ORDER — TORSEMIDE 20 MG/1
40 TABLET ORAL DAILY
Status: DISCONTINUED | OUTPATIENT
Start: 2021-04-16 | End: 2021-04-16

## 2021-04-15 RX ORDER — OXYCODONE HYDROCHLORIDE 5 MG/1
5 TABLET ORAL EVERY 6 HOURS PRN
Status: DISCONTINUED | OUTPATIENT
Start: 2021-04-15 | End: 2021-04-21

## 2021-04-15 RX ORDER — WARFARIN SODIUM 2.5 MG/1
2.5 TABLET ORAL
Status: DISCONTINUED | OUTPATIENT
Start: 2021-04-19 | End: 2021-04-22 | Stop reason: DRUGHIGH

## 2021-04-15 RX ORDER — ONDANSETRON 2 MG/ML
4 INJECTION INTRAMUSCULAR; INTRAVENOUS EVERY 6 HOURS PRN
Status: DISCONTINUED | OUTPATIENT
Start: 2021-04-15 | End: 2021-05-05 | Stop reason: HOSPADM

## 2021-04-15 RX ORDER — PANTOPRAZOLE SODIUM 40 MG/1
40 TABLET, DELAYED RELEASE ORAL
Status: DISCONTINUED | OUTPATIENT
Start: 2021-04-16 | End: 2021-05-05 | Stop reason: HOSPADM

## 2021-04-15 RX ORDER — PROMETHAZINE HYDROCHLORIDE 25 MG/1
12.5 TABLET ORAL EVERY 6 HOURS PRN
Status: DISCONTINUED | OUTPATIENT
Start: 2021-04-15 | End: 2021-05-05 | Stop reason: HOSPADM

## 2021-04-15 RX ORDER — WARFARIN SODIUM 5 MG/1
5 TABLET ORAL
Status: DISCONTINUED | OUTPATIENT
Start: 2021-04-15 | End: 2021-04-22 | Stop reason: DRUGHIGH

## 2021-04-15 RX ORDER — SODIUM CHLORIDE 0.9 % (FLUSH) 0.9 %
5-40 SYRINGE (ML) INJECTION PRN
Status: DISCONTINUED | OUTPATIENT
Start: 2021-04-15 | End: 2021-04-29 | Stop reason: SDUPTHER

## 2021-04-15 RX ORDER — INSULIN LISPRO 100 [IU]/ML
0-12 INJECTION, SOLUTION INTRAVENOUS; SUBCUTANEOUS
Status: DISCONTINUED | OUTPATIENT
Start: 2021-04-15 | End: 2021-05-05 | Stop reason: HOSPADM

## 2021-04-15 RX ORDER — ASPIRIN 81 MG/1
81 TABLET, CHEWABLE ORAL DAILY
Status: DISCONTINUED | OUTPATIENT
Start: 2021-04-16 | End: 2021-05-05 | Stop reason: HOSPADM

## 2021-04-15 RX ORDER — AMIODARONE HYDROCHLORIDE 200 MG/1
400 TABLET ORAL 2 TIMES DAILY
Status: DISCONTINUED | OUTPATIENT
Start: 2021-04-15 | End: 2021-04-28

## 2021-04-15 RX ADMIN — ASPIRIN 324 MG: 81 TABLET, CHEWABLE ORAL at 14:01

## 2021-04-15 RX ADMIN — Medication 10 ML: at 21:23

## 2021-04-15 RX ADMIN — GABAPENTIN 300 MG: 300 CAPSULE ORAL at 21:22

## 2021-04-15 RX ADMIN — DILTIAZEM HYDROCHLORIDE 10 MG: 5 INJECTION INTRAVENOUS at 12:03

## 2021-04-15 RX ADMIN — MONTELUKAST SODIUM 10 MG: 10 TABLET, FILM COATED ORAL at 21:22

## 2021-04-15 RX ADMIN — FUROSEMIDE 20 MG: 10 INJECTION, SOLUTION INTRAMUSCULAR; INTRAVENOUS at 18:10

## 2021-04-15 RX ADMIN — DILTIAZEM HYDROCHLORIDE 5 MG/HR: 5 INJECTION INTRAVENOUS at 12:05

## 2021-04-15 RX ADMIN — POTASSIUM CHLORIDE 20 MEQ: 1500 TABLET, EXTENDED RELEASE ORAL at 18:09

## 2021-04-15 RX ADMIN — OXYCODONE 5 MG: 5 TABLET ORAL at 21:22

## 2021-04-15 RX ADMIN — ONDANSETRON 4 MG: 2 INJECTION INTRAMUSCULAR; INTRAVENOUS at 21:23

## 2021-04-15 RX ADMIN — AMIODARONE HYDROCHLORIDE 400 MG: 200 TABLET ORAL at 21:22

## 2021-04-15 RX ADMIN — WARFARIN SODIUM 5 MG: 5 TABLET ORAL at 18:09

## 2021-04-15 RX ADMIN — INSULIN LISPRO 1 UNITS: 100 INJECTION, SOLUTION INTRAVENOUS; SUBCUTANEOUS at 21:24

## 2021-04-15 ASSESSMENT — PAIN SCALES - GENERAL
PAINLEVEL_OUTOF10: 8
PAINLEVEL_OUTOF10: 9
PAINLEVEL_OUTOF10: 0

## 2021-04-15 ASSESSMENT — ENCOUNTER SYMPTOMS
DIARRHEA: 0
CHEST TIGHTNESS: 0
WHEEZING: 0
ABDOMINAL PAIN: 0
COUGH: 0
NAUSEA: 0
VOMITING: 0
SHORTNESS OF BREATH: 1

## 2021-04-15 ASSESSMENT — HEART SCORE: ECG: 1

## 2021-04-15 ASSESSMENT — PAIN DESCRIPTION - LOCATION: LOCATION: BACK

## 2021-04-15 ASSESSMENT — PAIN DESCRIPTION - PAIN TYPE
TYPE: CHRONIC PAIN
TYPE: ACUTE PAIN

## 2021-04-15 ASSESSMENT — PAIN - FUNCTIONAL ASSESSMENT: PAIN_FUNCTIONAL_ASSESSMENT: ACTIVITIES ARE NOT PREVENTED

## 2021-04-15 ASSESSMENT — PAIN DESCRIPTION - FREQUENCY: FREQUENCY: CONTINUOUS

## 2021-04-15 NOTE — ED NOTES
Report given to Adry Freeman RN. Pt transported to floor with all belongings, Cadizem infusing, on tele and with all belongings.       Zabrina Oliveira RN  04/15/21 [id number]

## 2021-04-15 NOTE — TELEPHONE ENCOUNTER
She is in ER with afib/RVR. If needed to be admitted or if consultation requested we will address. Otherwise she needs follow up with RMM. Her last appt was cancelled due to hospitalization for HF and never rescheduled.      Steve Krause, SOLEDAD-CNP

## 2021-04-15 NOTE — H&P
HOSPITALISTS HISTORY AND PHYSICAL    4/15/2021 3:56 PM    Patient Information:  Sabas Cabello is a 77 y.o. female [id number]  PCP:  Saroj Diaz MD (Tel: [phone] )    Chief complaint:    Chief Complaint   Patient presents with    Chest Pain    Back Pain       History of Present Illness:  Orlin Bailey is a 77 y.o. female who yesterday night started to have midsternal chest pain that was 7 out of 10 pressure-like associated with palpitations heart rate was up to 140s. Was short of breath no diaphoresis some nausea no vomiting. Patient came to the ED was in A. fib with RVR. Patient states he has been compliant with her meds. No fever no chills no diarrhea no cough no sick contacts          REVIEW OF SYSTEMS:   Constitutional: Negative for fever,chills or night sweats  ENT: Negative for rhinorrhea, epistaxis, hoarseness, sore throat. Respiratory: Negative for shortness of breath,wheezing  Cardiovascular: see above  Gastrointestinal: Negative for nausea, vomiting, diarrhea  Genitourinary: Negative for polyuria, dysuria   Hematologic/Lymphatic: Negative for bleeding tendency, easy bruising  Musculoskeletal: Negative for myalgias and arthralgias  Neurologic: Negative for confusion,dysarthria. Skin: Negative for itching,rash  Psychiatric: Negative for depression,anxiety, agitation. Endocrine: Negative for polydipsia,polyuria,heat /cold intolerance. Past Medical History:   has a past medical history of Asthma, Atrial fibrillation ([address] Eosinophilia, Hemoptysis, HIGH CHOLESTEROL, Hx of blood clots, Hypertension, Irregular heart beat, Other specified gastritis without mention of hemorrhage, Palpitations, Skin cancer, and Type II or unspecified type diabetes mellitus without mention of complication, not stated as uncontrolled.      Past Surgical History:   has a past surgical history that includes Cholecystectomy;  section; Colonoscopy (2017); skin biopsy; bronchoscopy (2016); Coronary artery bypass graft (2018); Mitral valve replacement (2018); transesophageal echocardiogram (2018); Tunneled venous catheter placement (Left, 2018); Cardiac catheterization (2018); Insertable Cardiac Monitor (Left, 2018); Colonoscopy (2014); Hysterectomy; Upper gastrointestinal endoscopy (N/A, 10/10/2018); Colonoscopy (10/10/2018); Colonoscopy (N/A, 2020); Pain management procedure (N/A, 2020); and Pain management procedure (Bilateral, 2021). Medications:  No current facility-administered medications on file prior to encounter. Current Outpatient Medications on File Prior to Encounter   Medication Sig Dispense Refill    spironolactone (ALDACTONE) 25 MG tablet Take 2 tablets by mouth daily 180 tablet 0    amoxicillin-clavulanate (AUGMENTIN) 875-125 MG per tablet Take 1 tablet by mouth 2 times daily for 10 days 20 tablet 0    fluconazole (DIFLUCAN) 100 MG tablet Take 1 tablet by mouth daily for 7 days Take 5 mg coumadin when taking diflucan 7 tablet 0    torsemide (DEMADEX) 20 MG tablet 40mg morning, 40mg afternoon, 20mg evening 120 tablet 3    carvedilol (COREG) 6.25 MG tablet Take 1 tablet by mouth daily 180 tablet 3    oxyCODONE (ROXICODONE) 5 MG immediate release tablet Take 1 tablet by mouth every 6 hours as needed for Pain for up to 30 days. 100 tablet 0    gabapentin (NEURONTIN) 300 MG capsule Take 1 capsule by mouth nightly for 90 days.  Intended supply: 30 days 90 capsule 0    insulin glargine (LANTUS SOLOSTAR) 100 UNIT/ML injection pen INJECT 26 UNITS IN THE MORNING AND 18 UNITS AT BEDTIME 90 mL 1    exenatide (BYETTA 10 MCG PEN) 10 MCG/0.04ML injection Inject 0.04 mLs into the skin 2 times daily (with meals) 3 pen 1    OXYCODONE HCL PO Take 10 mg by mouth As of 2021,  states patient is taking 10mg with edge being taken off her pain after 3 hours.  ACETAMINOPHEN PO Take 500 mg by mouth every 6 hours as needed       blood glucose test strips (FREESTYLE LITE) strip USE TO TEST FOUR TIMES A  strip 4    albuterol sulfate  (90 Base) MCG/ACT inhaler USE 2 INHALATIONS EVERY 6 HOURS AS NEEDED FOR WHEEZING 25.5 g 2    potassium chloride (KLOR-CON M) 10 MEQ extended release tablet TAKE 1 TABLET DAILY 90 tablet 1    albuterol (PROVENTIL) (2.5 MG/3ML) 0.083% nebulizer solution Take 3 mLs by nebulization every 6 hours as needed for Wheezing 120 each 3    polyethylene glycol (GLYCOLAX) 17 GM/SCOOP powder Take 17 g by mouth every other day       omeprazole (PRILOSEC) 20 MG delayed release capsule Take 20 mg by mouth daily      FreeStyle Lancets MISC 1 each by Does not apply route 4 times daily 200 each 5    ondansetron (ZOFRAN) 4 MG tablet Take 1 tablet by mouth 3 times daily as needed for Nausea or Vomiting 30 tablet 0    warfarin (COUMADIN) 5 MG tablet TAKE 1 TABLET DAILY (Patient taking differently: Take 5 mg by mouth daily Managed by PCP) 100 tablet 4    montelukast (SINGULAIR) 10 MG tablet TAKE 1 TABLET NIGHTLY 90 tablet 4    Blood Glucose Monitoring Suppl ([employer] PRO GLUCOSE METER) GAETANO 1 Device by Does not apply route 4 times daily 1 Device 0    HUMALOG KWIKPEN 100 UNIT/ML SOPN TAKE AS INSTRUCTED BY YOUR PRESCRIBER (Patient taking differently: Only if high blood sugar-has not been using) 15 mL 8    nystatin (MYCOSTATIN) 237547 UNIT/ML suspension TAKE ONE TEASPOONFUL BY MOUTH FOUR TIMES A DAY FOR 5 DAYS 1 Bottle 2    aspirin 81 MG chewable tablet Take 1 tablet by mouth daily 30 tablet 3    BD PEN NEEDLE JANNET U/F 32G X 4 MM MISC USE 1 PEN NEEDLE FOUR TIMES A  each 3    vitamin B-12 500 MCG tablet Take 1 tablet by mouth daily 30 tablet 3       Allergies:   Allergies   Allergen Reactions    Wzjtxlag-Rvgnhtp-Jyuwtf [Fluocinolone] Shortness Of Breath    Ciprofloxacin Shortness Of Breath    Diovan [Valsartan] Shortness Of Breath    Flagyl [Metronidazole] Shortness Of Breath     Has taken diflucan at home 12/7/15    Metformin And Related [Metformin And Related] Shortness Of Breath    Benazepril      Other reaction(s): Not Recorded    Morphine      Bad reaction. \"makes her feel horrible\".  Saxagliptin      Other reaction(s): Not Recorded    Levaquin [Levofloxacin] Rash        Social History:  Patient Lives at home   reports that she has never smoked. She has never used smokeless tobacco. She reports that she does not drink alcohol or use drugs. Family History:  family history includes Asthma in her mother; Cancer in her father; Heart Disease in her mother; High Blood Pressure in her mother; Hypertension in her mother. ,     Physical Exam:  /65   Pulse 91   Temp 97.6 °F (36.4 °C) (Temporal)   Resp 12   SpO2 97%     General appearance:  Appears comfortable. AAOx3  HEENT: atraumatic, Pupils equal, muscous membranes moist, no masses appreciated  Cardiovascular: irregular irregular no murmurs appreciated  Respiratory: CTAB no wheezing  Gastrointestinal: Abdomen soft, non-tender, BS+  EXT: 1+ edema  Neurology: no gross focal deficts  Psychiatry: Appropriate affect. Not agitated  Skin: Warm, dry, no rashes appreciated    Labs:  CBC:   Lab Results   Component Value Date    WBC 5.6 04/15/2021    RBC 3.93 04/15/2021    HGB 10.6 04/15/2021    HCT 33.5 04/15/2021    MCV 85.2 04/15/2021    MCH 27.0 04/15/2021    MCHC 31.7 04/15/2021    RDW 17.2 04/15/2021     04/15/2021    MPV 8.7 04/15/2021     BMP:    Lab Results   Component Value Date     04/15/2021    K 3.4 04/15/2021     04/15/2021    CO2 29 04/15/2021    BUN 22 04/15/2021    CREATININE 1.1 04/15/2021    CALCIUM 9.0 04/15/2021    GFRAA 59 04/15/2021    LABGLOM 49 04/15/2021    LABGLOM 45 12/06/2018    GLUCOSE 138 04/15/2021     XR CHEST PORTABLE   Final Result   No acute cardiopulmonary disease.   Stable

## 2021-04-15 NOTE — ED NOTES
Pt presents to the ED d/t chest pain and dyspnea on exertion. Pt states she has HX of afib. Also reports that her heart feels like it is beating fast and feels similar to when she had afib RVR in the past. Denies any other symptoms. Pt is alert and oriented. No signs of resp distress. Tachycardia present upon arrival in 140s. Pt is now stable at below 100s. Cardizem infusing at 5ml/hr. HR stable. Other vitals stable. Call light within reach. Will continue to monitor.        Nikita Muller RN  04/15/21 [id number]

## 2021-04-15 NOTE — PROGRESS NOTES
Clinical Pharmacy Note: Pharmacy to Dose Warfarin    Pharmacy consulted by Dr. Sylvia Carbajal to dose warfarin. Teddy Delatorre is a 77 y.o. female  is receiving warfarin for indication: DVT, new Afib. INR Goal Range: 2-3  Prior to admission warfarin dosing regimen: 2.5 mg MON; 5 mg all other days  INR today:   Lab Results   Component Value Date    INR 1.99 04/15/2021       Assessment/Plan:  INR is SLIGHTLY subtherapeutic on prior to admission dosing regimen. Based on today's assessment, continue home dose regimen of 2.5 mg Mondays, 5 mg all other days. Daily INR is ordered. Pharmacy will continue to monitor and make adjustments to regimen as necessary.      Thank you for the consult,     Shawn Ch, PharmD  ED Pharmacist [id number]  4/15/2021

## 2021-04-15 NOTE — ACP (ADVANCE CARE PLANNING)
Advanced Care Planning Note.     Purpose of Encounter: Advanced care planning in light of hospitalization  Parties In Attendance: Patient,    Decisional Capacity: Yes  Subjective: Patient  understand that this conversation is to address long term care goal  Objective: patient with afib with rvr, chest pain ef 20-25%  Goals of Care Determination: Patient would pursue CPR intubation PEG tube and dialysis if required no tracheostomy or long-term ventilation  Code Status: full code  Time spent on Advanced care Plannin minutes  Advanced Care Planning Documents: documented patient's wishes, would like  Wisam Caro to make medical decisions if unable to make decisions    Reji Strickland MD  4/15/2021 3:58 PM

## 2021-04-15 NOTE — ED PROVIDER NOTES
[facility]        Pt Name: Claudio Turcios  MRN: [id number]  [place] 1945  Date of evaluation: 4/15/2021  Provider: Jeronimo Silver PA-C  PCP: Fabi Lockwood MD    DELFIN. I have evaluated this patient. My supervising physician was available for consultation. CHIEF COMPLAINT       Chief Complaint   Patient presents with    Chest Pain    Back Pain       HISTORY OF PRESENT ILLNESS   (Location, Timing/Onset, Context/Setting, Quality, Duration, Modifying Factors, Severity, Associated Signs and Symptoms)  Note limiting factors. Claudio Turcios is a 77 y.o. female presents the emergency department with reports of difficulty as a pertains to chest pain and dyspnea on exertion. Patient states that she has a history of atrial fibrillation is warfarin anticoagulated. She states her INRlast week was approximately 1.9. Last night approximately 10:00 she started experiencing pain and discomfort in the middle portion of her chest and it did not radiate. Was a significant pressure sensation. She states she has had this symptom ease off but is still having discomfort at the present time. Patient states she also feels that her heart is beating fast and is very similar to bouts where she has had A. fib RVR in the past.  She states that any kind of exertion does cause her to be mildly short of breath but at rest she is fine. She denies fevers chills or cough. No additional GI or  complaints. Is with the above-mentioned she presents to the ED for evaluation and treatment. Nursing Notes were all reviewed and agreed with or any disagreements were addressed in the HPI. REVIEW OF SYSTEMS    (2-9 systems for level 4, 10 or more for level 5)     Review of Systems   Constitutional: Positive for fatigue. Negative for activity change, chills and fever. Respiratory: Positive for shortness of breath.  Negative for cough, chest tightness and  PAIN MANAGEMENT PROCEDURE Bilateral 1/18/2021    BILATERAL T11 TRANSFORAMINAL EPIDURAL STEROID INJECTION WITH FLUOROSCOPY performed by Mateus Santos MD at [address] TRANSESOPHAGEAL ECHOCARDIOGRAM  08/21/2018    during CABG/MVR    TUNNELED VENOUS CATHETER PLACEMENT Left 08/23/2018    Dr. Morena Pinon for HD---since removed    UPPER GASTROINTESTINAL ENDOSCOPY N/A 10/10/2018    w/biopsy performed by Kenneth Heredia MD at [phone]       Previous Medications    ACETAMINOPHEN PO    Take 500 mg by mouth every 6 hours as needed     ALBUTEROL (PROVENTIL) (2.5 MG/3ML) 0.083% NEBULIZER SOLUTION    Take 3 mLs by nebulization every 6 hours as needed for Wheezing    ALBUTEROL SULFATE  (90 BASE) MCG/ACT INHALER    USE 2 INHALATIONS EVERY 6 HOURS AS NEEDED FOR WHEEZING    AMOXICILLIN-CLAVULANATE (AUGMENTIN) 875-125 MG PER TABLET    Take 1 tablet by mouth 2 times daily for 10 days    ASPIRIN 81 MG CHEWABLE TABLET    Take 1 tablet by mouth daily    BD PEN NEEDLE JANNET U/F 32G X 4 MM MISC    USE 1 PEN NEEDLE FOUR TIMES A DAY    BLOOD GLUCOSE MONITORING SUPPL (EMBRACE PRO GLUCOSE METER) GAETANO    1 Device by Does not apply route 4 times daily    BLOOD GLUCOSE TEST STRIPS (FREESTYLE LITE) STRIP    USE TO TEST FOUR TIMES A DAY    CARVEDILOL (COREG) 6.25 MG TABLET    Take 1 tablet by mouth daily    EXENATIDE (BYETTA 10 MCG PEN) 10 MCG/0.04ML INJECTION    Inject 0.04 mLs into the skin 2 times daily (with meals)    FLUCONAZOLE (DIFLUCAN) 100 MG TABLET    Take 1 tablet by mouth daily for 7 days Take 5 mg coumadin when taking diflucan    FREESTYLE LANCETS MISC    1 each by Does not apply route 4 times daily    GABAPENTIN (NEURONTIN) 300 MG CAPSULE    Take 1 capsule by mouth nightly for 90 days.  Intended supply: 30 days    HUMALOG KWIKPEN 100 UNIT/ML SOPN    TAKE AS INSTRUCTED BY YOUR PRESCRIBER    INSULIN GLARGINE (LANTUS SOLOSTAR) 100 UNIT/ML INJECTION PEN    INJECT 26 UNITS IN THE MORNING AND 18 UNITS AT BEDTIME    MONTELUKAST (SINGULAIR) 10 MG TABLET    TAKE 1 TABLET NIGHTLY    NYSTATIN (MYCOSTATIN) 051094 UNIT/ML SUSPENSION    TAKE ONE TEASPOONFUL BY MOUTH FOUR TIMES A DAY FOR 5 DAYS    OMEPRAZOLE (PRILOSEC) 20 MG DELAYED RELEASE CAPSULE    Take 20 mg by mouth daily    ONDANSETRON (ZOFRAN) 4 MG TABLET    Take 1 tablet by mouth 3 times daily as needed for Nausea or Vomiting    OXYCODONE (ROXICODONE) 5 MG IMMEDIATE RELEASE TABLET    Take 1 tablet by mouth every 6 hours as needed for Pain for up to 30 days. OXYCODONE HCL PO    Take 10 mg by mouth As of 1/21/2021,  states patient is taking 10mg with edge being taken off her pain after 3 hours.     POLYETHYLENE GLYCOL (GLYCOLAX) 17 GM/SCOOP POWDER    Take 17 g by mouth every other day     POTASSIUM CHLORIDE (KLOR-CON M) 10 MEQ EXTENDED RELEASE TABLET    TAKE 1 TABLET DAILY    SPIRONOLACTONE (ALDACTONE) 25 MG TABLET    Take 2 tablets by mouth daily    TORSEMIDE (DEMADEX) 20 MG TABLET    40mg morning, 40mg afternoon, 20mg evening    VITAMIN B-12 500 MCG TABLET    Take 1 tablet by mouth daily    WARFARIN (COUMADIN) 5 MG TABLET    TAKE 1 TABLET DAILY         ALLERGIES     Wnledzyg-vsvqywr-ikqrda [fluocinolone], Ciprofloxacin, Diovan [valsartan], Flagyl [metronidazole], Metformin and related [metformin and related], Benazepril, Morphine, Saxagliptin, and Levaquin [levofloxacin]    FAMILYHISTORY       Family History   Problem Relation Age of Onset    Cancer Father     Asthma Mother     Hypertension Mother     Heart Disease Mother     High Blood Pressure Mother           SOCIAL HISTORY       Social History     Tobacco Use    Smoking status: Never Smoker    Smokeless tobacco: Never Used   Substance Use Topics    Alcohol use: No    Drug use: No       SCREENINGS             PHYSICAL EXAM    (up to 7 for level 4, 8 or more for level 5)     ED Triage Vitals [04/15/21 1042]   BP Temp Temp Source Pulse Resp SpO2 Height Weight   120/88 97.6 °F (36.4 °C) Temporal 140 22 94 % -- --       Physical Exam  Vitals signs and nursing note reviewed. Constitutional:       General: She is awake. She is not in acute distress. Appearance: Normal appearance. She is well-developed. She is not diaphoretic. HENT:      Head: Normocephalic and atraumatic. No raccoon eyes, Villatoro's sign, contusion or laceration. Right Ear: External ear normal.      Left Ear: External ear normal.   Eyes:      General: No scleral icterus. Right eye: No discharge. Left eye: No discharge. Conjunctiva/sclera: Conjunctivae normal.   Neck:      Musculoskeletal: Normal range of motion. Vascular: No JVD. Cardiovascular:      Rate and Rhythm: Regular rhythm. Tachycardia present. Heart sounds: No murmur. No friction rub. No gallop. Pulmonary:      Effort: Pulmonary effort is normal. No accessory muscle usage or respiratory distress. Breath sounds: Normal breath sounds. No wheezing, rhonchi or rales. Abdominal:      General: There is no distension. Palpations: Abdomen is soft. Abdomen is not rigid. There is no mass. Tenderness: There is no abdominal tenderness. There is no guarding or rebound. Skin:     General: Skin is warm and dry. Neurological:      Mental Status: She is alert and oriented to person, place, and time. GCS: GCS eye subscore is 4. GCS verbal subscore is 5. GCS motor subscore is 6. Cranial Nerves: No cranial nerve deficit. Sensory: No sensory deficit. Coordination: Coordination normal.   Psychiatric:         Behavior: Behavior normal. Behavior is cooperative.          DIAGNOSTIC RESULTS   LABS:    Labs Reviewed   CBC WITH AUTO DIFFERENTIAL - Abnormal; Notable for the following components:       Result Value    RBC 3.93 (*)     Hemoglobin 10.6 (*)     Hematocrit 33.5 (*)     RDW 17.2 (*)     Lymphocytes Absolute 0.5 (*)     All other components within normal limits    Narrative:     Performed at:  [facility] [address]  [place]   Phone [phone]   COMPREHENSIVE METABOLIC PANEL W/ REFLEX TO MG FOR LOW K - Abnormal; Notable for the following components:    Potassium reflex Magnesium 3.4 (*)     Glucose 138 (*)     BUN 22 (*)     GFR Non- 49 (*)     GFR  59 (*)     Albumin 3.2 (*)     Albumin/Globulin Ratio 0.8 (*)     Alkaline Phosphatase 242 (*)     All other components within normal limits    Narrative:     Performed at:  [facility] [address],  [place]   Phone [phone]   TROPONIN - Abnormal; Notable for the following components:    Troponin 0.02 (*)     All other components within normal limits    Narrative:     Performed at:  [facility] [address],  [place]   Phone [phone]   BRAIN NATRIURETIC PEPTIDE - Abnormal; Notable for the following components:    Pro-BNP 8,766 (*)     All other components within normal limits    Narrative:     Performed at:  [facility] [address]  [place]   Phone [phone] - Abnormal; Notable for the following components:    Protime 23.2 (*)     INR 1.99 (*)     All other components within normal limits    Narrative:     Performed at:  [facility] [address],  [place]   Phone [phone]   BLOOD GAS, VENOUS - Abnormal; Notable for the following components:    pH, [place] 7.495 (*)     pCO2, [place] 39.3 (*)     pO2, [place] 172.0 (*)     HCO3, Venous 30.3 (*)     Base Excess, [place] 6.5 (*)     Carboxyhemoglobin 3.2 (*)     All other components within normal limits    Narrative:     Performed at:  [facility] [address]  [place]   Phone [phone]   LACTATE, SEPSIS    Narrative:     Performed at:  [facility]) - Temp:       TempSrc:       SpO2: 94% 95% 95% 95%       Patient was given the following medications:  Medications   dilTIAZem injection 10 mg (10 mg Intravenous Given 4/15/21 1203)     Followed by   dilTIAZem 125 mg in dextrose 5 % 125 mL infusion (5 mg/hr Intravenous New Bag 4/15/21 1205)   aspirin chewable tablet 324 mg (has no administration in time range)           The patient's detailed history of present illness is documented as above. Upon arrival to the emergency department the patient's vital signs are as documented. The patient is noted to be hemodynamically stable and afebrile. Physical examination findings are as above. IV access was obtained laboratory testing and work-up initiated. EKG demonstrates atrial fibrillation with rapid ventricular response at 132. No evidence of acute ST elevation. Please see attending physician details for further EKG interpretation in comparison. Cardizem infusion was initiated after Cardizem bolus. This was able to rate control the patient with ease but she is still in atrial fibrillation. CBC demonstrates no evidence of leukocytosis. She has mild anemia at 10.6 and 33.5 respectively with no evidence of thrombocytopenia or thrombocytosis. BUN is 22 creatinine is 1.1 nonfasting glucose is 138. Her potassium is mildly low at 3.4 CO2 at 29 with a gap of 10 LFTs demonstrate elevation of alkaline phosphatase without bilirubinemia or transaminitis. Troponin is 0.02 proBNP is 8766. INR is 1.99 lactic acid is not elevated at 1.0. Blood gas demonstrates a pH of 7.495 she is not hypercapnic or hypoxic and magnesium is 2.10. Portable chest x-ray demonstrates no evidence of acute cardiopulmonary process. The above-mentioned on recheck the patient is resting much more comfortably and states that her chest pain is completely gone without intervention other than correcting her rate related to her A. fib. She is still having her mild regular usual back pain.   She will

## 2021-04-15 NOTE — CONSULTS
[facility]   Electrophysiology Consultation   Date: 4/15/2021  Reason for Consultation: Atrial fibrillation    CC: palpitation, chest pressure   HPI: Jessica Triana is a 77 y.o. female who has presented to the hospital with palpitations rapid heart rate. She states that since last night her heart rate started being fast.  She felt palpitations and some chest pressure. She came to the emergency room. Was found to be in atrial fibrillation with rapid ventricular response. She states that her heart rate has gone up as high as 140 bpm.  She has been started on IV Cardizem drip in the emergency room and has been admitted to the hospital.    She states that she was discharged from hospital on carvedilol twice daily which was later increased by her primary care however on her last visit with heart failure, carvedilol was decreased to once daily. Patient has history of small bowel pneumatosis with loculated pneumoperitoneum, with recent hospital admission and treated by general surgery. At the time her medications were adjusted due to low blood pressure. She has been recently started on antibiotic therapy. Patient has a complex past medical history significant for CAD, diabetes, HLD, DVT/PE on Coumadin, history of CABG x3 with maze and left atrial appendage clip on 8/25/2018, bioprosthetic mitral valve replacement, history of persistent symptomatic atrial fibrillation. She also has moderate aortic stenosis and LV dysfunction with HFrEF. Patient underwent catheter ablation (PVI, roofline and posterior buttocks and CTI flutter on 10/30/2019). She also has had a loop recorder. She has episodes of second-degree Mobitz type I AV block and has prolonged IA interval during sinus rhythm. She had recurrence of atrial fibrillation post ablation. Underwent cardioversion on 10/21/2020.   She had a JUDE which showed severe LV dysfunction and also she had aortic stenosis and was referred to Dr. Ambrosio Garcias for mitral   regurgitation.      Left atrial enlargement. Spontaneous echo contrast seen in the left atrium.   Stump of the left atrial appendage noted with no thrombus.      The aortic valve is thickened/calcified with decreased leaflet mobility   consistent with aortic stenosis. There is trivial aortic insufficiency.     Echo: 9/20/19   -Borderline global systolic function with an ejection fraction estimated at   45-50%.  -Apical akinesis noted.   -Left atrial enlargement noted based on volume index.   -The bioprosthetic mitral valve is well seated with peak velocity of 2.58m/s   and a mean gradient of 9 mmHg. The mitral valve area by pressure halftime is   estimated at 2.22 cm^2. There is trivial mitral regurgitation.   -Mild aortic stenosis with a peak velocity of 2.39m/s and a mean pressure   gradient of 13 mmHg. The aortic valve area is estimated at 1.16 cm^2 by   continuity equation and 1.22 cm^2 by planimetry. There is trivial aortic   insufficiency.   -There is mild-to-moderate tricuspid regurgitation with a RVSP estimation of   47 mmHg.   -The IVC is dilated .   -Indeterminate diastolic function.     GXT: 8/2018  Small sized lateral completely reversible defect of mild intensity   consistent with ischemia in the territory of the LCx and/or LAD .   Normal LV function.   Overall findings represent a intermediate risk scan.     Cath: 8/2018  Heavily calcified vessels  Mild AS-Normal LV FXN  90% mid LAD  90% prox Diag 1  90% mid Cx  Mild Dominant RCA     REC: CVTS opinion--Heavy Ca, DM, Chronic AC Rx---CABG vs LAD/Diag stenting-      I independently reviewed the cardiac diagnostic studies, ECG and relevant imaging studies.      Lab Results   Component Value Date    LVEF 25 10/21/2020    LVEFMODE Echo 09/20/2019     Lab Results   Component Value Date    TSH 1.90 04/01/2021       Physical Examination:  Vitals:    04/15/21 1600   BP: 109/72   Pulse: 94   Resp: 12   Temp:    SpO2: 94%      No intake/output data recorded. Wt Readings from Last 3 Encounters:   21 205 lb (93 kg)   21 206 lb 9.6 oz (93.7 kg)   21 202 lb (91.6 kg)     Temp  Av.6 °F (36.4 °C)  Min: 97.6 °F (36.4 °C)  Max: 97.6 °F (36.4 °C)  Pulse  Av  Min: 67  Max: 140  BP  Min: 95/66  Max: 130/81  SpO2  Av.2 %  Min: 94 %  Max: 97 %  No intake or output data in the 24 hours ending 04/15/21 0343      I independently reviewed all cardiac tracing from cardiac telemetry. · Constitutional: Oriented. No distress. · Head: Normocephalic and atraumatic. · Mouth/Throat: Oropharynx is clear and moist.   · Eyes: Conjunctivae normal. EOM are normal.   · Neck: Neck supple. No JVD present. · Cardiovascular: Normal rate, Irregular rhythm, S1&S2. Systolic murmur   · Pulmonary/Chest: Bilateral respiratory sounds. No rhonchi. · Abdominal: Soft. No tenderness. · Musculoskeletal: No tenderness. + edema    · Lymphadenopathy: Has no cervical adenopathy. · Neurological: Alert and oriented. Follows command, No Gross deficit   · Skin: Skin is warm, No rash noted.    · Psychiatric: Has a normal behavior       Scheduled Meds:   potassium chloride  20 mEq Oral Once    aspirin  81 mg Oral Daily    carvedilol  6.25 mg Oral Daily    gabapentin  300 mg Oral Nightly    insulin glargine  25 Units Subcutaneous Daily    montelukast  10 mg Oral Nightly    [START ON 2021] pantoprazole  40 mg Oral QAM AC    torsemide  40 mg Oral Daily    sodium chloride flush  5-40 mL Intravenous 2 times per day    insulin lispro  0-12 Units Subcutaneous TID WC    insulin lispro  0-6 Units Subcutaneous Nightly    furosemide  20 mg Intravenous Once    [START ON 2021] warfarin  2.5 mg Oral Once per day on Mon    warfarin  5 mg Oral Once per day on Sun Tue Wed Thu Fri Sat     Continuous Infusions:   dilTIAZem (CARDIZEM) 125 mg in dextrose 5% 125 mL infusion 5 mg/hr (04/15/21 1205)    sodium chloride      dextrose       PRN Meds:.     Review of System:  [x] Full ROS obtained and negative except as mentioned in HPI    Prior to Admission medications    Medication Sig Start Date End Date Taking? Authorizing Provider   spironolactone (ALDACTONE) 25 MG tablet Take 2 tablets by mouth daily 4/13/21   Vesna Chandler APRN - CNS   amoxicillin-clavulanate (AUGMENTIN) 875-125 MG per tablet Take 1 tablet by mouth 2 times daily for 10 days 4/13/21 4/23/21  Angie Kelly MD   fluconazole (DIFLUCAN) 100 MG tablet Take 1 tablet by mouth daily for 7 days Take 5 mg coumadin when taking diflucan 4/13/21 4/20/21  Светлана Severino MD   torsemide [facility]) 20 MG tablet 40mg morning, 40mg afternoon, 20mg evening 4/12/21   Silvia Medel MD   carvedilol (COREG) 6.25 MG tablet Take 1 tablet by mouth daily 4/2/21   Silvia Medel MD   oxyCODONE (ROXICODONE) 5 MG immediate release tablet Take 1 tablet by mouth every 6 hours as needed for Pain for up to 30 days. 3/23/21 4/22/21  Светлана Severino MD   gabapentin (NEURONTIN) 300 MG capsule Take 1 capsule by mouth nightly for 90 days. Intended supply: 30 days 3/12/21 6/10/21  Светлана Severino MD   insulin glargine (LANTUS SOLOSTAR) 100 UNIT/ML injection pen INJECT 26 UNITS IN THE MORNING AND 18 UNITS AT BEDTIME 3/10/21   Kwabena Awad MD   exenatide (BYETTA 10 MCG PEN) 10 MCG/0.04ML injection Inject 0.04 mLs into the skin 2 times daily (with meals) 3/3/21   Светлана Severino MD   OXYCODONE HCL PO Take 10 mg by mouth As of 1/21/2021,  states patient is taking 10mg with edge being taken off her pain after 3 hours.     Historical Provider, MD   ACETAMINOPHEN PO Take 500 mg by mouth every 6 hours as needed     Historical Provider, MD   blood glucose test strips (FREESTYLE LITE) strip USE TO TEST FOUR TIMES A DAY 1/5/21   Светлана Severino MD   albuterol sulfate  (90 Base) MCG/ACT inhaler USE 2 INHALATIONS EVERY 6 HOURS AS NEEDED FOR WHEEZING 11/17/20   Светлана Severino MD   potassium chloride (KLOR-CON M) [Valsartan] Shortness Of Breath    Flagyl [Metronidazole] Shortness Of Breath     Has taken diflucan at home 12/7/15    Metformin And Related [Metformin And Related] Shortness Of Breath    Benazepril      Other reaction(s): Not Recorded    Morphine      Bad reaction. \"makes her feel horrible\".  Saxagliptin      Other reaction(s): Not Recorded    Levaquin [Levofloxacin] Rash       Social History:  Reviewed. reports that she has never smoked. She has never used smokeless tobacco. She reports that she does not drink alcohol or use drugs. Family History:  Reviewed. Reviewed. No family history of SCD. Relevant and available labs, and cardiovascular diagnostics reviewed. Reviewed. Recent Labs     04/15/21  1135      K 3.4*      CO2 29   BUN 22*   CREATININE 1.1     Recent Labs     04/15/21  1135   WBC 5.6   HGB 10.6*   HCT 33.5*   MCV 85.2        CrCl cannot be calculated (Unknown ideal weight.). Lab Results   Component Value Date     09/02/2020     11/27/2018       I independently reviewed all cardiac tracing from cardiac telemetry. I independently reviewed relevant and available cardiac diagnostic tests ECG, CXR, Echo, Stress test, Device interrogation, Holter, CT scan. Outside medical records via Care everywhere reviewed and summarized in H&P above. Complex medical condition with multiple medical problems affecting prognosis and outcome of EP interventions  Severe exacerbation of underlying medical condition requiring hospitalization and at risk of decompensation. All questions and concerns were addressed to the patient/family. Alternatives to my treatment were discussed. I have discussed the above stated plan and the patient verbalized understanding and agreed with the plan. NOTE: This report was transcribed using voice recognition software.  Every effort was made to ensure accuracy, however, inadvertent computerized transcription errors may be present.      Akil Childers MD, MPH  [place] [address]   Office: [phone]  Fax: [phone]

## 2021-04-16 LAB
ANION GAP SERPL CALCULATED.3IONS-SCNC: 10 MMOL/L (ref 3–16)
BASOPHILS ABSOLUTE: 0 K/UL (ref 0–0.2)
BASOPHILS RELATIVE PERCENT: 0.8 %
BUN BLDV-MCNC: 21 MG/DL (ref 7–20)
CALCIUM SERPL-MCNC: 9.1 MG/DL (ref 8.3–10.6)
CHLORIDE BLD-SCNC: 102 MMOL/L (ref 99–110)
CO2: 29 MMOL/L (ref 21–32)
CREAT SERPL-MCNC: 1.6 MG/DL (ref 0.6–1.2)
EKG ATRIAL RATE: 129 BPM
EKG DIAGNOSIS: NORMAL
EKG Q-T INTERVAL: 322 MS
EKG QRS DURATION: 88 MS
EKG QTC CALCULATION (BAZETT): 477 MS
EKG R AXIS: 33 DEGREES
EKG T AXIS: 61 DEGREES
EKG VENTRICULAR RATE: 132 BPM
EOSINOPHILS ABSOLUTE: 0.4 K/UL (ref 0–0.6)
EOSINOPHILS RELATIVE PERCENT: 9.1 %
GFR AFRICAN AMERICAN: 38
GFR NON-AFRICAN AMERICAN: 31
GLUCOSE BLD-MCNC: 182 MG/DL (ref 70–99)
GLUCOSE BLD-MCNC: 189 MG/DL (ref 70–99)
GLUCOSE BLD-MCNC: 199 MG/DL (ref 70–99)
GLUCOSE BLD-MCNC: 95 MG/DL (ref 70–99)
GLUCOSE BLD-MCNC: 99 MG/DL (ref 70–99)
HCT VFR BLD CALC: 31.6 % (ref 36–48)
HEMOGLOBIN: 10 G/DL (ref 12–16)
INR BLD: 1.91 (ref 0.86–1.14)
LYMPHOCYTES ABSOLUTE: 0.6 K/UL (ref 1–5.1)
LYMPHOCYTES RELATIVE PERCENT: 15.1 %
MAGNESIUM: 2.2 MG/DL (ref 1.8–2.4)
MCH RBC QN AUTO: 27.1 PG (ref 26–34)
MCHC RBC AUTO-ENTMCNC: 31.6 G/DL (ref 31–36)
MCV RBC AUTO: 85.8 FL (ref 80–100)
MONOCYTES ABSOLUTE: 0.6 K/UL (ref 0–1.3)
MONOCYTES RELATIVE PERCENT: 14.7 %
NEUTROPHILS ABSOLUTE: 2.4 K/UL (ref 1.7–7.7)
NEUTROPHILS RELATIVE PERCENT: 60.3 %
PDW BLD-RTO: 16.8 % (ref 12.4–15.4)
PERFORMED ON: ABNORMAL
PERFORMED ON: NORMAL
PLATELET # BLD: 233 K/UL (ref 135–450)
PMV BLD AUTO: 8.4 FL (ref 5–10.5)
POTASSIUM REFLEX MAGNESIUM: 3.3 MMOL/L (ref 3.5–5.1)
PROTHROMBIN TIME: 22.3 SEC (ref 10–13.2)
RBC # BLD: 3.68 M/UL (ref 4–5.2)
SODIUM BLD-SCNC: 141 MMOL/L (ref 136–145)
TSH REFLEX: 1.67 UIU/ML (ref 0.27–4.2)
WBC # BLD: 4 K/UL (ref 4–11)

## 2021-04-16 PROCEDURE — 6370000000 HC RX 637 (ALT 250 FOR IP): Performed by: INTERNAL MEDICINE

## 2021-04-16 PROCEDURE — 85610 PROTHROMBIN TIME: CPT

## 2021-04-16 PROCEDURE — 85025 COMPLETE CBC W/AUTO DIFF WBC: CPT

## 2021-04-16 PROCEDURE — 80048 BASIC METABOLIC PNL TOTAL CA: CPT

## 2021-04-16 PROCEDURE — 99223 1ST HOSP IP/OBS HIGH 75: CPT | Performed by: INTERNAL MEDICINE

## 2021-04-16 PROCEDURE — 6360000002 HC RX W HCPCS: Performed by: INTERNAL MEDICINE

## 2021-04-16 PROCEDURE — 36415 COLL VENOUS BLD VENIPUNCTURE: CPT

## 2021-04-16 PROCEDURE — 93010 ELECTROCARDIOGRAM REPORT: CPT | Performed by: INTERNAL MEDICINE

## 2021-04-16 PROCEDURE — 2580000003 HC RX 258: Performed by: NURSE PRACTITIONER

## 2021-04-16 PROCEDURE — 99233 SBSQ HOSP IP/OBS HIGH 50: CPT | Performed by: NURSE PRACTITIONER

## 2021-04-16 PROCEDURE — 94760 N-INVAS EAR/PLS OXIMETRY 1: CPT

## 2021-04-16 PROCEDURE — 6360000002 HC RX W HCPCS: Performed by: NURSE PRACTITIONER

## 2021-04-16 PROCEDURE — 2580000003 HC RX 258: Performed by: INTERNAL MEDICINE

## 2021-04-16 PROCEDURE — 2060000000 HC ICU INTERMEDIATE R&B

## 2021-04-16 PROCEDURE — 83735 ASSAY OF MAGNESIUM: CPT

## 2021-04-16 RX ORDER — SODIUM CHLORIDE 9 MG/ML
INJECTION, SOLUTION INTRAVENOUS CONTINUOUS
Status: ACTIVE | OUTPATIENT
Start: 2021-04-16 | End: 2021-04-16

## 2021-04-16 RX ORDER — SPIRONOLACTONE 25 MG/1
50 TABLET ORAL DAILY
Status: DISCONTINUED | OUTPATIENT
Start: 2021-04-17 | End: 2021-04-18

## 2021-04-16 RX ORDER — POTASSIUM CHLORIDE 20 MEQ/1
40 TABLET, EXTENDED RELEASE ORAL ONCE
Status: COMPLETED | OUTPATIENT
Start: 2021-04-16 | End: 2021-04-16

## 2021-04-16 RX ORDER — CARVEDILOL 6.25 MG/1
6.25 TABLET ORAL 2 TIMES DAILY
Status: DISCONTINUED | OUTPATIENT
Start: 2021-04-17 | End: 2021-04-17

## 2021-04-16 RX ORDER — FUROSEMIDE 10 MG/ML
80 INJECTION INTRAMUSCULAR; INTRAVENOUS DAILY
Status: DISCONTINUED | OUTPATIENT
Start: 2021-04-16 | End: 2021-04-20

## 2021-04-16 RX ADMIN — INSULIN GLARGINE 25 UNITS: 100 INJECTION, SOLUTION SUBCUTANEOUS at 10:29

## 2021-04-16 RX ADMIN — Medication 10 ML: at 10:25

## 2021-04-16 RX ADMIN — DEXTROSE MONOHYDRATE 150 MG: 50 INJECTION, SOLUTION INTRAVENOUS at 14:48

## 2021-04-16 RX ADMIN — Medication 10 ML: at 21:22

## 2021-04-16 RX ADMIN — OXYCODONE 5 MG: 5 TABLET ORAL at 22:32

## 2021-04-16 RX ADMIN — AMIODARONE HYDROCHLORIDE 400 MG: 200 TABLET ORAL at 10:16

## 2021-04-16 RX ADMIN — GABAPENTIN 300 MG: 300 CAPSULE ORAL at 21:22

## 2021-04-16 RX ADMIN — TORSEMIDE 40 MG: 20 TABLET ORAL at 10:16

## 2021-04-16 RX ADMIN — OXYCODONE 5 MG: 5 TABLET ORAL at 05:05

## 2021-04-16 RX ADMIN — ASPIRIN 81 MG: 81 TABLET, CHEWABLE ORAL at 10:16

## 2021-04-16 RX ADMIN — WARFARIN SODIUM 5 MG: 5 TABLET ORAL at 17:49

## 2021-04-16 RX ADMIN — POTASSIUM CHLORIDE 40 MEQ: 1500 TABLET, EXTENDED RELEASE ORAL at 10:16

## 2021-04-16 RX ADMIN — PROMETHAZINE HYDROCHLORIDE 12.5 MG: 25 TABLET ORAL at 22:34

## 2021-04-16 RX ADMIN — INSULIN LISPRO 2 UNITS: 100 INJECTION, SOLUTION INTRAVENOUS; SUBCUTANEOUS at 16:18

## 2021-04-16 RX ADMIN — SODIUM CHLORIDE: 9 INJECTION, SOLUTION INTRAVENOUS at 11:02

## 2021-04-16 RX ADMIN — OXYCODONE 5 MG: 5 TABLET ORAL at 16:22

## 2021-04-16 RX ADMIN — ACETAMINOPHEN 650 MG: 325 TABLET ORAL at 00:26

## 2021-04-16 RX ADMIN — CARVEDILOL 6.25 MG: 6.25 TABLET, FILM COATED ORAL at 10:16

## 2021-04-16 RX ADMIN — FUROSEMIDE 80 MG: 10 INJECTION, SOLUTION INTRAMUSCULAR; INTRAVENOUS at 16:18

## 2021-04-16 RX ADMIN — INSULIN LISPRO 1 UNITS: 100 INJECTION, SOLUTION INTRAVENOUS; SUBCUTANEOUS at 21:12

## 2021-04-16 RX ADMIN — ONDANSETRON 4 MG: 2 INJECTION INTRAMUSCULAR; INTRAVENOUS at 17:49

## 2021-04-16 RX ADMIN — INSULIN LISPRO 2 UNITS: 100 INJECTION, SOLUTION INTRAVENOUS; SUBCUTANEOUS at 13:13

## 2021-04-16 RX ADMIN — AMIODARONE HYDROCHLORIDE 400 MG: 200 TABLET ORAL at 21:22

## 2021-04-16 RX ADMIN — PANTOPRAZOLE SODIUM 40 MG: 40 TABLET, DELAYED RELEASE ORAL at 05:05

## 2021-04-16 RX ADMIN — MONTELUKAST SODIUM 10 MG: 10 TABLET, FILM COATED ORAL at 21:22

## 2021-04-16 ASSESSMENT — PAIN SCALES - GENERAL
PAINLEVEL_OUTOF10: 7
PAINLEVEL_OUTOF10: 6

## 2021-04-16 ASSESSMENT — PAIN DESCRIPTION - LOCATION: LOCATION: BACK

## 2021-04-16 ASSESSMENT — PAIN DESCRIPTION - DESCRIPTORS: DESCRIPTORS: ACHING

## 2021-04-16 NOTE — PLAN OF CARE
Problem: Pain:  Goal: Control of acute pain  Description: Control of acute pain  Outcome: Ongoing     Problem: Bleeding:  Goal: Will show no signs and symptoms of excessive bleeding  Description: Will show no signs and symptoms of excessive bleeding  Outcome: Ongoing     Problem: Cardiovascular  Goal: No DVT, peripheral vascular complications  Outcome: Ongoing  Goal: Hemodynamic stability  Outcome: Ongoing  Goal: Anticoagulate/Hct stable  Outcome: Ongoing  Goal: Agreement to quit smoking  Outcome: Ongoing  Goal: Weight maintained or lost  Outcome: Ongoing  Goal: Understanding of dietary restrictions  Outcome: Ongoing     Problem: Respiratory  Goal: No pulmonary complications  Outcome: Ongoing  Goal: O2 Sat > 90%  Outcome: Ongoing  Goal: Supplemental O2 requirements decreased  Outcome: Ongoing  Goal: Agreement to quit smoking  Outcome: Ongoing  Goal: Pneumonia vaccine at discharge as needed  Outcome: Ongoing     Problem: Falls - Risk of:  Goal: Will remain free from falls  Description: Will remain free from falls  Outcome: Ongoing  Goal: Absence of physical injury  Description: Absence of physical injury  Outcome: Ongoing     VSS, oxy given 1x, 80 lasix given and toresemide D/Cd. No cardioversion and planned for Monday at this time. Micheline Ryan would like PT to be loaded with amio before cardioversion is performed.

## 2021-04-16 NOTE — CONSULTS
04/16/21 0026    Or    acetaminophen (TYLENOL) suppository 650 mg  650 mg Rectal Q6H PRN Alycia Tracy MD        insulin lispro (1 Unit Dial) 0-12 Units  0-12 Units Subcutaneous TID WC Alycia Tracy MD   2 Units at 04/16/21 1313    insulin lispro (1 Unit Dial) 0-6 Units  0-6 Units Subcutaneous Nightly Alycia Tracy MD   1 Units at 04/15/21 2124    glucose (GLUTOSE) 40 % oral gel 15 g  15 g Oral PRN Alycia Tracy MD        dextrose 50 % IV solution  12.5 g Intravenous PRN Alycia Tracy MD        glucagon (rDNA) injection 1 mg  1 mg Intramuscular PRN Alycia Tracy MD        dextrose 5 % solution  100 mL/hr Intravenous PRMD Anjelica Mir [START ON 4/19/2021] warfarin (COUMADIN) tablet 2.5 mg  2.5 mg Oral Once per day on Juana Cook MD        warfarin (COUMADIN) tablet 5 mg  5 mg Oral Once per day on Sun Tue Wed Thu Fri Sat Alycia Tracy MD   5 mg at 04/15/21 1809    amiodarone (CORDARONE) tablet 400 mg  400 mg Oral BID Nannette Waller MD   400 mg at 04/16/21 1016       Past Medical History:   Diagnosis Date    Asthma     Atrial fibrillation (HCC)     Eosinophilia     Hemoptysis     HIGH CHOLESTEROL     Hx of blood clots     Hypertension     Irregular heart beat     Other specified gastritis without mention of hemorrhage     Palpitations     Skin cancer     basal and squamous    Type II or unspecified type diabetes mellitus without mention of complication, not stated as uncontrolled      Past Surgical History:   Procedure Laterality Date    BRONCHOSCOPY  07/18/2016    Dr. Jessica Clear - brushings from [address] [place] [address] [place]  08/16/2018    Dr. Choudhury Enter  02/07/2017    Dr. Heber Aguero - sigmoid diverticulosis, polypectomies x3    COLONOSCOPY  01/17/2014    Dr. Heber Aguero - sigmoid diverticulosis, polypectomies x3, internal hemorrhoids    COLONOSCOPY  10/10/2018    w/biopsy performed by Javed Huber MD at [facility] COLONOSCOPY N/A 8/7/2020    COLONOSCOPY DIAGNOSTIC performed by Tati Lim MD at [address] GRAFT  08/21/2018    Dr. Katie Encinas - x3 (LIMA-LAD, L SV-D1-PLV) modified BL MAZE procedure w/obliteration of WAYNE using 45mm AtriClip    HYSTERECTOMY      INSERTABLE CARDIAC MONITOR Left 08/16/2018    Dr. Shine Suarez - Dilia Nunn # [id number] [employer]    MITRAL VALVE REPLACEMENT  08/21/2018    Dr. Katie Encinas - 27mm Medtronic Cinch tissue valve    PAIN MANAGEMENT PROCEDURE N/A 12/18/2020    C6-C7 MIDLINE  EPIDURAL STEROID INJECTION WITH FLUOROSCOPY performed by Les Appiah MD at [address] Bilateral 1/18/2021    BILATERAL T11 TRANSFORAMINAL EPIDURAL STEROID INJECTION WITH FLUOROSCOPY performed by Les Appiah MD at [address] TRANSESOPHAGEAL ECHOCARDIOGRAM  08/21/2018    during CABG/MVR    TUNNELED VENOUS CATHETER PLACEMENT Left 08/23/2018    Dr. Baldemar Thrasher -  for HD---since removed    UPPER GASTROINTESTINAL ENDOSCOPY N/A 10/10/2018    w/biopsy performed by Javed Huber MD at [facility]     Family History   Problem Relation Age of Onset    Cancer Father     Asthma Mother     Hypertension Mother     Heart Disease Mother     High Blood Pressure Mother      Social History     Socioeconomic History    Marital status:      Spouse name: Not on file    Number of children: Not on file    Years of education: Not on file    Highest education level: Not on file   Occupational History    Not on file   Social Needs    Financial resource strain: Not on file    Food insecurity     Worry: Not on file     Inability: Not on file    Transportation needs     Medical: Not on file     Non-medical: Not on file   Tobacco Use    Smoking status: Never Smoker    Smokeless tobacco: Never Used   Substance and Sexual Activity    Alcohol use: No    Drug use: No    Sexual activity: Not on file Lifestyle    Physical activity     Days per week: Not on file     Minutes per session: Not on file    Stress: Not on file   Relationships    Social connections     Talks on phone: Not on file     Gets together: Not on file     Attends Restorationism service: Not on file     Active member of club or organization: Not on file     Attends meetings of clubs or organizations: Not on file     Relationship status: Not on file    Intimate partner violence     Fear of current or ex partner: Not on file     Emotionally abused: Not on file     Physically abused: Not on file     Forced sexual activity: Not on file   Other Topics Concern    Not on file   Social History Narrative    Not on file       Review of Systems:   · Constitutional: there has been no unanticipated weight loss. There's been no change in energy level, sleep pattern, or activity level. · Eyes: No visual changes or diplopia. No scleral icterus. · ENT: No Headaches, hearing loss or vertigo. No mouth sores or sore throat. · Cardiovascular: Reviewed in HPI  · Respiratory: No cough or wheezing, no sputum production. No hematemesis. · Gastrointestinal: No abdominal pain, appetite loss, blood in stools. No change in bowel or bladder habits. · Genitourinary: No dysuria, trouble voiding, or hematuria. · Musculoskeletal:  No gait disturbance, weakness or joint complaints. · Integumentary: No rash or pruritis. · Neurological: No headache, diplopia, change in muscle strength, numbness or tingling. No change in gait, balance, coordination, mood, affect, memory, mentation, behavior. · Psychiatric: No anxiety, no depression. · Endocrine: No malaise, fatigue or temperature intolerance. No excessive thirst, fluid intake, or urination. No tremor. · Hematologic/Lymphatic: No abnormal bruising or bleeding, blood clots or swollen lymph nodes. · Allergic/Immunologic: No nasal congestion or hives.     Physical Examination:    Vitals:    04/16/21 0459 04/16/21 2185 unspecified type    4. Dyspnea on exertion     5. Chronic systolic HF    Plan:  1. Continue amiodarone drip po amiodarone  2. Gentle diuresis, fluid overload which is mild is due to atrial fibrillation  3. Continue spironolactone 50 mg po qd  4. Increase coreg 6.25 mg po bid  5. Consider switching IV lasix to po torsemide tomorrow  6. Continue warfarin  7/  CHF education reinforced. ~salt restriction  ~fluid restriction  ~medication compliance  ~daily weights and notify of any significant weight gain/loss  ~establish with CHF nurse  ~outpatient follow-up with our CHF team        I appreciate the opportunity of cooperating in the care of this patient.     Stephanie Kimble M.D., [facility] [place]

## 2021-04-16 NOTE — PROGRESS NOTES
[facility]   Electrophysiology Progress Note   Date: 4/16/2021  Admit Date: 4/15/2021     Reason for follow up: Atrial fibrillation RVR    Chief Complaint:   Chief Complaint   Patient presents with    Chest Pain    Back Pain     History of Present Illness: History obtained from patient and medical record. Maurice Whiting is a 77 y.o. female with a past medical history of HTN, HLD, DM, CAD s/p CABG x 3, S/p bioprosthetic MVR, WAYNE clip (8/2018), afib s/p Maze 2018, CMP (EF25-30%) DVT/PE on warfarin and sCHF follows with HF. S/p ablation of afib w/ PVI, roofline, posterior, CTI flutter 10/30/2019. S/p ILR. Hx of Mobitz I AVB and prolonged VT interval. S/p JUDE/CV. Has been treated with amiodarone stopped in 2018. Presented with palpitations and tachycardia, found to be in afib/RVR and started on diltiazem gtt. Reportedly her carvedilol was reduced recently. Interval Hx: Today, she is afib/RVR rates 120-130's on monitor. Started on amiodarone. TSH and LFT normal.   No new complaints today. No major events overnight. Denies having chest pain, palpitations, shortness of breath, orthopnea/PND, cough, or dizziness. Patient seen and examined. Clinical notes reviewed. Telemetry reviewed.     Problem List:   Patient Active Problem List    Diagnosis Date Noted    Hypotension     Acute on chronic systolic heart failure due to valvular disease ([facility] [address] 02/26/2021    Stage 3 chronic kidney disease     Coronary artery disease involving native heart without angina pectoris     Deep vein thrombosis (DVT) of non-extremity vein 12/15/2020    Aortic valve stenosis 11/03/2020    Pneumatosis intestinalis of small intestine 05/10/2020    Ischemic cardiomyopathy 01/20/2020    Occlusion and stenosis of bilateral carotid arteries 01/20/2020    Persistent atrial fibrillation ([address] 10/30/2019    S/P MVR (mitral valve repair)     Thoracic degenerative disc disease 06/07/2019    Chronic combined systolic (congestive) and diastolic (congestive) heart failure (HCC) 01/15/2019    Obesity, Class I, BMI 30-34.9     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    Goiter 09/07/2018    S/P CABG x 3 08/22/2018    Coronary artery disease due to lipid rich plaque     Nonrheumatic mitral valve regurgitation     Encounter for loop recorder check 08/16/2018    Cardiac arrhythmia     Pneumoperitoneum 07/28/2018    PAF (paroxysmal atrial fibrillation) ([facility] [address]     Hypertension     Asthma 01/12/2018    Type 2 diabetes mellitus with hyperglycemia, with long-term current use of insulin ([facility] [address] 04/27/2017    Primary osteoarthritis of both knees 03/21/2017    Multiple pulmonary nodules 08/01/2016    History of pulmonary embolism     B12 deficiency 09/08/2014    Paroxysmal SVT (supraventricular tachycardia) ([facility] [address] 81/70/9589    Eosinophilic gastritis 28/86/3264    Generalized osteoarthrosis, involving multiple sites 03/25/2013    Long term current use of anticoagulant 12/19/2012    Hypercholesteremia 12/19/2012      Assessment and Plan:  Persistent Atrial Fibrillation RVR   - Remains in afib/RVR   - On Coreg and amiodarone 400 mg bid    ~ TSH and LFT OK   - S/p WAYNE clip   - Will give additional IV amiodarone bolus today   - Cardioversion after amiodarone loading, likely monday  Cardiomyopathy   - She has been treated with HF on OMT   - ACID has been discussed in the past   - Repeat echo when HR better controlled to evaluate EF  CAD   - Stable   - No reports of angina   - Continue medical therapy  HTN   - Controlled. No medication changes, BP is soft    - 150 mg amiodarone IV bolus once  - Over the weakness if rates remain uncontrolled, consider bolus/gtt for rapid amiodarone loading    All pertinent information and plan of care discussed with the EP physician. Multiple medical conditions with risk of decompensation. Allergies:   Allergies   Allergen Reactions    Eqciugam-Qqmpvft-Rmiusy [Fluocinolone] Shortness Of Breath    Ciprofloxacin Shortness Of Breath    Diovan [Valsartan] Shortness Of Breath    Flagyl [Metronidazole] Shortness Of Breath     Has taken diflucan at home 12/7/15    Metformin And Related [Metformin And Related] Shortness Of Breath    Benazepril      Other reaction(s): Not Recorded    Morphine      Bad reaction. \"makes her feel horrible\".  Saxagliptin      Other reaction(s): Not Recorded    Levaquin [Levofloxacin] Rash     Home Meds:  Prior to Visit Medications    Medication Sig Taking? Authorizing Provider   spironolactone (ALDACTONE) 25 MG tablet Take 2 tablets by mouth daily Yes Violeta Bhatti, APRN - CNS   amoxicillin-clavulanate (AUGMENTIN) 875-125 MG per tablet Take 1 tablet by mouth 2 times daily for 10 days Yes Ga Andre MD   torsemide (DEMADEX) 20 MG tablet 40mg morning, 40mg afternoon, 20mg evening Yes Gisela Velázquez MD   carvedilol (COREG) 6.25 MG tablet Take 1 tablet by mouth daily Yes Gisela Velázquez MD   gabapentin (NEURONTIN) 300 MG capsule Take 1 capsule by mouth nightly for 90 days. Intended supply: 30 days Yes Lenny Peters MD   insulin glargine (LANTUS SOLOSTAR) 100 UNIT/ML injection pen INJECT 26 UNITS IN THE MORNING AND 18 UNITS AT BEDTIME Yes Tamia Hong MD   exenatide (BYETTA 10 MCG PEN) 10 MCG/0.04ML injection Inject 0.04 mLs into the skin 2 times daily (with meals) Yes Lenny Peters MD   OXYCODONE HCL PO Take 10 mg by mouth As of 1/21/2021,  states patient is taking 10mg with edge being taken off her pain after 3 hours.  Yes Historical Provider, MD   ACETAMINOPHEN PO Take 500 mg by mouth every 6 hours as needed  Yes Historical Provider, MD   albuterol sulfate  (90 Base) MCG/ACT inhaler USE 2 INHALATIONS EVERY 6 HOURS AS NEEDED FOR WHEEZING Yes Lenny Peters MD   potassium chloride (KLOR-CON M) 10 MEQ extended release tablet TAKE 1 TABLET DAILY Yes Lenny Peters MD   albuterol (PROVENTIL) (2.5 MG/3ML) 0.083% nebulizer solution Take 3 mLs by nebulization every 6 hours as needed for Wheezing Yes Lenny Peters MD   polyethylene glycol (GLYCOLAX) 17 GM/SCOOP powder Take 17 g by mouth every other day  Yes Historical Provider, MD   omeprazole (PRILOSEC) 20 MG delayed release capsule Take 20 mg by mouth daily Yes Historical Provider, MD   ondansetron (ZOFRAN) 4 MG tablet Take 1 tablet by mouth 3 times daily as needed for Nausea or Vomiting Yes Lenny Peters MD   warfarin (COUMADIN) 5 MG tablet TAKE 1 TABLET DAILY  Patient taking differently: Take 5 mg by mouth daily Managed by PCP Yes Dee Dee Evans MD   montelukast (SINGULAIR) 10 MG tablet TAKE 1 TABLET NIGHTLY Yes Dee Dee Evans MD   aspirin 81 MG chewable tablet Take 1 tablet by mouth daily Yes Lenny Peters MD   vitamin B-12 500 MCG tablet Take 1 tablet by mouth daily Yes Varun Pisano MD   fluconazole (DIFLUCAN) 100 MG tablet Take 1 tablet by mouth daily for 7 days Take 5 mg coumadin when taking diflucan  Lenny Peters MD   oxyCODONE (ROXICODONE) 5 MG immediate release tablet Take 1 tablet by mouth every 6 hours as needed for Pain for up to 30 days. Lenny Peters MD   blood glucose test strips (FREESTYLE LITE) strip USE TO TEST FOUR TIMES A DAY  Lenny Peters MD   FreeStyle Lancets MISC 1 each by Does not apply route 4 times daily  Lenny Peters MD   Blood Glucose Monitoring Suppl (EMBRACE PRO GLUCOSE METER) GAETANO 1 Device by Does not apply route 4 times daily  Lenny Peters MD   HUMALOG KWIKPEN 100 UNIT/ML SOPN TAKE AS INSTRUCTED BY YOUR PRESCRIBER  Patient taking differently:  Only if high blood sugar-has not been using  Lenny Peters MD   nystatin (MYCOSTATIN) 114245 UNIT/ML suspension TAKE ONE TEASPOONFUL BY MOUTH FOUR TIMES A DAY FOR 5 DAYS  Lenny Peters MD   BD PEN NEEDLE JANNET U/F 32G X 4 MM MISC USE 1 PEN NEEDLE FOUR TIMES A DAY  Lenny Peters MD      Scheduled Meds:   aspirin  81 mg Oral Daily    carvedilol  6.25 mg Oral Daily    gabapentin  300 mg Oral Nightly    insulin glargine  25 Units Subcutaneous Daily    montelukast  10 mg Oral Nightly    pantoprazole  40 mg Oral QAM AC    sodium chloride flush  5-40 mL Intravenous 2 times per day    insulin lispro  0-12 Units Subcutaneous TID WC    insulin lispro  0-6 Units Subcutaneous Nightly    [START ON 2021] warfarin  2.5 mg Oral Once per day on Mon    warfarin  5 mg Oral Once per day on Sun Tue Wed Thu Fri Sat    amiodarone  400 mg Oral BID     Continuous Infusions:   sodium chloride 50 mL/hr at 21 1102    sodium chloride      dextrose       PRN Meds:albuterol, oxyCODONE, sodium chloride flush, sodium chloride, promethazine **OR** ondansetron, polyethylene glycol, acetaminophen **OR** acetaminophen, glucose, dextrose, glucagon (rDNA), dextrose   Past Medical History:  Past Medical History:   Diagnosis Date    Asthma     Atrial fibrillation (HCC)     Eosinophilia     Hemoptysis     HIGH CHOLESTEROL     Hx of blood clots     Hypertension     Irregular heart beat     Other specified gastritis without mention of hemorrhage     Palpitations     Skin cancer     basal and squamous    Type II or unspecified type diabetes mellitus without mention of complication, not stated as uncontrolled         Past Surgical History:    has a past surgical history that includes Cholecystectomy;  section; Colonoscopy (2017); skin biopsy; bronchoscopy (2016); Coronary artery bypass graft (2018); Mitral valve replacement (2018); transesophageal echocardiogram (2018); Tunneled venous catheter placement (Left, 2018); Cardiac catheterization (2018); Insertable Cardiac Monitor (Left, 2018); Colonoscopy (2014); Hysterectomy; Upper gastrointestinal endoscopy (N/A, 10/10/2018); Colonoscopy (10/10/2018); Colonoscopy (N/A, 2020);  Pain management procedure (N/A, 2020); and Pain management procedure (Bilateral, 1/18/2021). Social History:  Reviewed. reports that she has never smoked. She has never used smokeless tobacco. She reports that she does not drink alcohol or use drugs. Family History:  Reviewed. family history includes Asthma in her mother; Cancer in her father; Heart Disease in her mother; High Blood Pressure in her mother; Hypertension in her mother. Denies family history of sudden cardiac death, arrhythmia, premature CAD    Review of Systems:  · Constitutional: Negative for fever, weight changes, or weakness  · Skin: Negative for bruising, bleeding, blood clots, or changes in skin pigment  · HEENT: Negative for vision changes or dysphagia  · Respiratory: Reviewed in HPI  · Cardiovascular: Reviewed in HPI  · Gastrointestinal: Negative for abdominal pain, N/V/D, constipation, or black/tarry stools  · Genito-Urinary: Negative for hematuria  · Musculoskeletal: No focal weakness  · Neurological/Psych: Negative for confusion or TIA-like symptoms. No anxiety, depression, or insomnia    Physical Examination:  Vitals:    04/16/21 1016   BP: 104/72   Pulse: 137   Resp: 20   Temp: 98 °F (36.7 °C)   SpO2: 94%      In: 240 [P.O.:240]  Out: -    Wt Readings from Last 3 Encounters:   04/16/21 203 lb 4 oz (92.2 kg)   04/13/21 205 lb (93 kg)   04/02/21 206 lb 9.6 oz (93.7 kg)       Intake/Output Summary (Last 24 hours) at 4/16/2021 1123  Last data filed at 4/15/2021 1715  Gross per 24 hour   Intake 240 ml   Output --   Net 240 ml     Telemetry: Personally Reviewed Atrial fibrillation w/ RVR  · Constitutional: Cooperative and in no apparent distress, and appears well nourished  · Skin: Warm and pink; no cyanosis, bruising, or clubbing  · HEENT: Symmetric and normocephalic. Conjunctiva pink with clear sclera. Mucus membranes pink and moist.   · Cardiovascular: irregular and rhythm. S1 & S2, negative for murmurs. Peripheral pulses 2+, capillary refill < 3 seconds.  negative elevation of JVP. + tachycardia  · Respiratory: Respirations symmetric and unlabored. Lungs clear to auscultation bilaterally, no wheezing, crackles, or rhonchi  · Gastrointestinal: Abdomen soft and round. Bowel sounds normoactive in all quadrants. · Musculoskeletal: No focal weakness. · Neurologic/Psych: Awake and orientated to person, place and time. Calm affect, appropriate mood    Pertinent labs, diagnostic, device, and imaging results reviewed as a part of this visit    Labs:    BMP:   Recent Labs     04/15/21  1135 21  0744    141   K 3.4* 3.3*    102   CO2 29 29   BUN 22* 21*   CREATININE 1.1 1.6*   MG 2.10 2.20     Estimated Creatinine Clearance: 37 mL/min (A) (based on SCr of 1.6 mg/dL (H)). CBC:   Recent Labs     04/15/21  1135 21  0744   WBC 5.6 4.0   HGB 10.6* 10.0*   HCT 33.5* 31.6*   MCV 85.2 85.8    233     Thyroid:   Lab Results   Component Value Date    TSH 1.90 2021     Lipids:   Lab Results   Component Value Date    CHOL 149 2020    HDL 40 2020    TRIG 94 2021     LFTS:   Lab Results   Component Value Date    ALT 10 04/15/2021    AST 15 04/15/2021    ALKPHOS 215 04/15/2021    PROT 6.6 04/15/2021    AGRATIO 0.8 04/15/2021    BILITOT 0.6 04/15/2021     Cardiac Enzymes:   Lab Results   Component Value Date    TROPONINI 0.02 04/15/2021    TROPONINI 0.02 04/15/2021    TROPONINI <0.01 05/10/2020     Coags:   Lab Results   Component Value Date    PROTIME 22.3 2021    INR 1.91 2021     EC/15/2021: afib/RVR    ECHO:  2020  Summary   Dobutamine echocardiogram for aortic stenosis. Very technically limited exam due to atrial fibrillation. Baseline echocardiogram shows severe left ventricular dysfunction with   anterior, lateral and apical hypokinesis with an ejection fraction of 20-25   %. There is no improvement in wall motion with low or high dose dobutamine   suggestive of nonviable myocardium.    Mild prosthetic aortic valve stenosis with a mean gradient of 16mmHg and   peak velocity of 2.47m/s at rest. After dobutamine stress the mean AV   gradient rises to 20mmHg with 20mcg of dobutamine consistent with mild to   moderate aortic stenosis. Prosthetic mitral valve with a mean gradient of 7mmHg  9/15/2020  Summary   Left ventricular cavity size is dilated. There is normal wall thickness with a moderately severe reduction in   systolic function. LV ejection fraction is visually estimated to be 25-30%. Indeterminate diastolic function. The right ventricle is not well visualized but appears to be normal in size   with moderately reduced function. The left atrium is moderately dilated. A bioprosthetic mitral valve with a mean gradient of 7 mmHg. This may be a   normal gradient for this valve but could suggest mild stenosis. Trivial mitral regurgitation. The aortic valve is thickened/calcified with a mean gradient of 16 mmHg   consistent with at least mild aortic stenosis. This is likely underestimated   due to low cardiac output secondary to LV dysfunction. No significant aortic valve regurgitation. Moderate tricuspid regurgitation with RVSP of 48 mmHg. Stress Test: 8/7/2018  Summary    Small sized lateral completely reversible defect of mild intensity    consistent with ischemia in the territory of the LCx and/or LAD .    Normal LV function.    Overall findings represent a intermediate risk scan.         All questions and concerns were addressed to the patient. Alternatives to my treatment were discussed. I have discussed the above stated plan with patient and the nurse. The patient verbalized understanding and agreed with the plan. Thank you for allowing to us to participate in the care of Shun WhiteSOLEDAD Jha-CNP  [facility], [place], operated by [facility]   Office: [phone]

## 2021-04-16 NOTE — PROGRESS NOTES
Pharmacy to Dose Warfarin    Pharmacy consulted to dose warfarin for DVT, AFib. INR Goal: 2-3    INR today: 1.91    Assessment/Plan:  - INR today is slightly subtherapeutic  - Patient is starting on amiodarone loading dose, new start. Amiodarone increases the effectiveness of warfarin  - Give warfarin 5mg this evening as INR is subtherapeutic, but may need to decrease dose in coming days. Pharmacy will continue to follow.     Sabrina Babin, Juan Carlos  4/16/2021 11:00 AM

## 2021-04-16 NOTE — PLAN OF CARE
Patient settled in room with  at bedside. Calm and pleasant. No c/o pain/discomfort. No abnormal bleeding observed. Call light in reach.

## 2021-04-16 NOTE — PLAN OF CARE
Assessment complete. See flow sheet. Pain evaluation complete. oxycodone 5  mg and tylenol given for back pain with patient satisfaction. Patient remains afib at this time with hr in 90s after amiodarone administration, no acute distress, patient advised to be NPO past midnight for plans for cardioversion, andencouraged to use call light with any needs. Patient states understanding, call light in reach, Dtr at  bed side, Will continue to monitor.                         Problem: Pain:  Goal: Control of acute pain  Description: Control of acute pain  Outcome: Ongoing     Problem: Cardiovascular  Goal: No DVT, peripheral vascular complications  Outcome: Ongoing

## 2021-04-16 NOTE — PROGRESS NOTES
[facility]    4/16/2021 10:19 AM        Name: Adrián Odonnell .               Admitted: 4/15/2021  Primary Care Provider: José Todd MD (Tel: [phone])            Subjective:      No further chest pain no sob cough or nausea  Reviewed interval ancillary notes    Current Medications  potassium chloride (KLOR-CON M) extended release tablet 40 mEq, Once  albuterol (PROVENTIL) nebulizer solution 2.5 mg, Q6H PRN  aspirin chewable tablet 81 mg, Daily  carvedilol (COREG) tablet 6.25 mg, Daily  gabapentin (NEURONTIN) capsule 300 mg, Nightly  insulin glargine (LANTUS;BASAGLAR) injection pen 25 Units, Daily  montelukast (SINGULAIR) tablet 10 mg, Nightly  pantoprazole (PROTONIX) tablet 40 mg, QAM AC  oxyCODONE (ROXICODONE) immediate release tablet 5 mg, Q6H PRN  torsemide (DEMADEX) tablet 40 mg, Daily  sodium chloride flush 0.9 % injection 5-40 mL, 2 times per day  sodium chloride flush 0.9 % injection 5-40 mL, PRN  0.9 % sodium chloride infusion, PRN  promethazine (PHENERGAN) tablet 12.5 mg, Q6H PRN    Or  ondansetron (ZOFRAN) injection 4 mg, Q6H PRN  polyethylene glycol (GLYCOLAX) packet 17 g, Daily PRN  acetaminophen (TYLENOL) tablet 650 mg, Q6H PRN    Or  acetaminophen (TYLENOL) suppository 650 mg, Q6H PRN  insulin lispro (1 Unit Dial) 0-12 Units, TID WC  insulin lispro (1 Unit Dial) 0-6 Units, Nightly  glucose (GLUTOSE) 40 % oral gel 15 g, PRN  dextrose 50 % IV solution, PRN  glucagon (rDNA) injection 1 mg, PRN  dextrose 5 % solution, PRN  [START ON 4/19/2021] warfarin (COUMADIN) tablet 2.5 mg, Once per day on Mon  warfarin (COUMADIN) tablet 5 mg, Once per day on Sun Tue Wed Thu Fri Sat  amiodarone (CORDARONE) tablet 400 mg, BID        Objective:  /76   Pulse 109   Temp 98.1 °F (36.7 °C) (Oral)   Resp 16   Ht 5' 8\" (1.727 m)   Wt 203 lb 4 oz (92.2 kg)   SpO2 97%   BMI 30.90 kg/m²     Intake/Output Summary (Last 24 hours) at 4/16/2021 1019  Last data filed at 4/15/2021 1715  Gross per 24 hour   Intake 240 ml   Output --   Net 240 ml      Wt Readings from Last 3 Encounters:   04/16/21 203 lb 4 oz (92.2 kg)   04/13/21 205 lb (93 kg)   04/02/21 206 lb 9.6 oz (93.7 kg)       General appearance:  Appears comfortable. AAOx3  HEENT: atraumatic, Pupils equal, muscous membranes moist, no masses appreciated  Cardiovascularirregulary irregular no murmurs appreciated  Respiratory: CTAB no wheezing  Gastrointestinal: Abdomen soft, non-tender, BS+  EXT: trace edema  Neurology: no gross focal deficts  Psychiatry: Appropriate affect. Not agitated  Skin: Warm, dry, no rashes appreciated    Labs and Tests:  CBC:   Recent Labs     04/15/21  1135 04/16/21  0744   WBC 5.6 4.0   HGB 10.6* 10.0*    233     BMP:    Recent Labs     04/15/21  1135 04/16/21  0744    141   K 3.4* 3.3*    102   CO2 29 29   BUN 22* 21*   CREATININE 1.1 1.6*   GLUCOSE 138* 95     Hepatic:   Recent Labs     04/15/21  1135 04/15/21  1806   AST 16 15   ALT 11 10   BILITOT 0.7 0.6   ALKPHOS 242* 215*     XR CHEST PORTABLE   Final Result   No acute cardiopulmonary disease. Stable cardiomegaly with no evidence of   overt failure. Recent imaging reviewed    Problem List  Active Problems:    PAF (paroxysmal atrial fibrillation) (HCC)  Resolved Problems:    * No resolved hospital problems.  *          Assessment/Plan:   PAf with rvr  -improved on amiodarone load, cardioversion per ep     Chest pain likely secondary   - troponin stable, cards on board     Acute on chronic systolic heart failure: ef 20-25  - home meds  - hold lasix    TORIBIO: hold lasix ivf x 6 hours         Dm2: lantus and SSI     Hypokalemia: replace and recheck     DVT prophylaxis coumadin  Code status full code        David Last MD   4/16/2021 10:19 AM

## 2021-04-17 LAB
ANION GAP SERPL CALCULATED.3IONS-SCNC: 11 MMOL/L (ref 3–16)
BUN BLDV-MCNC: 24 MG/DL (ref 7–20)
CALCIUM SERPL-MCNC: 8.6 MG/DL (ref 8.3–10.6)
CHLORIDE BLD-SCNC: 100 MMOL/L (ref 99–110)
CO2: 25 MMOL/L (ref 21–32)
CREAT SERPL-MCNC: 1.5 MG/DL (ref 0.6–1.2)
GFR AFRICAN AMERICAN: 41
GFR NON-AFRICAN AMERICAN: 34
GLUCOSE BLD-MCNC: 111 MG/DL (ref 70–99)
GLUCOSE BLD-MCNC: 112 MG/DL (ref 70–99)
GLUCOSE BLD-MCNC: 138 MG/DL (ref 70–99)
GLUCOSE BLD-MCNC: 163 MG/DL (ref 70–99)
GLUCOSE BLD-MCNC: 165 MG/DL (ref 70–99)
INR BLD: 2.21 (ref 0.86–1.14)
PERFORMED ON: ABNORMAL
POTASSIUM REFLEX MAGNESIUM: 3.9 MMOL/L (ref 3.5–5.1)
PROTHROMBIN TIME: 25.9 SEC (ref 10–13.2)
SODIUM BLD-SCNC: 136 MMOL/L (ref 136–145)

## 2021-04-17 PROCEDURE — 36415 COLL VENOUS BLD VENIPUNCTURE: CPT

## 2021-04-17 PROCEDURE — 80048 BASIC METABOLIC PNL TOTAL CA: CPT

## 2021-04-17 PROCEDURE — 99233 SBSQ HOSP IP/OBS HIGH 50: CPT | Performed by: NURSE PRACTITIONER

## 2021-04-17 PROCEDURE — 2060000000 HC ICU INTERMEDIATE R&B

## 2021-04-17 PROCEDURE — 6360000002 HC RX W HCPCS: Performed by: INTERNAL MEDICINE

## 2021-04-17 PROCEDURE — 85610 PROTHROMBIN TIME: CPT

## 2021-04-17 PROCEDURE — 6370000000 HC RX 637 (ALT 250 FOR IP): Performed by: INTERNAL MEDICINE

## 2021-04-17 PROCEDURE — 2580000003 HC RX 258: Performed by: INTERNAL MEDICINE

## 2021-04-17 RX ORDER — METOPROLOL SUCCINATE 25 MG/1
25 TABLET, EXTENDED RELEASE ORAL DAILY
Status: DISCONTINUED | OUTPATIENT
Start: 2021-04-17 | End: 2021-05-04

## 2021-04-17 RX ADMIN — MONTELUKAST SODIUM 10 MG: 10 TABLET, FILM COATED ORAL at 23:21

## 2021-04-17 RX ADMIN — GABAPENTIN 300 MG: 300 CAPSULE ORAL at 23:08

## 2021-04-17 RX ADMIN — Medication 10 ML: at 23:12

## 2021-04-17 RX ADMIN — AMIODARONE HYDROCHLORIDE 400 MG: 200 TABLET ORAL at 08:49

## 2021-04-17 RX ADMIN — ONDANSETRON 4 MG: 2 INJECTION INTRAMUSCULAR; INTRAVENOUS at 07:47

## 2021-04-17 RX ADMIN — INSULIN LISPRO 2 UNITS: 100 INJECTION, SOLUTION INTRAVENOUS; SUBCUTANEOUS at 17:35

## 2021-04-17 RX ADMIN — Medication 10 ML: at 09:58

## 2021-04-17 RX ADMIN — PANTOPRAZOLE SODIUM 40 MG: 40 TABLET, DELAYED RELEASE ORAL at 05:40

## 2021-04-17 RX ADMIN — OXYCODONE 5 MG: 5 TABLET ORAL at 23:09

## 2021-04-17 RX ADMIN — SPIRONOLACTONE 50 MG: 25 TABLET ORAL at 08:49

## 2021-04-17 RX ADMIN — OXYCODONE 5 MG: 5 TABLET ORAL at 07:47

## 2021-04-17 RX ADMIN — AMIODARONE HYDROCHLORIDE 400 MG: 200 TABLET ORAL at 23:08

## 2021-04-17 RX ADMIN — WARFARIN SODIUM 5 MG: 5 TABLET ORAL at 17:36

## 2021-04-17 RX ADMIN — INSULIN LISPRO 1 UNITS: 100 INJECTION, SOLUTION INTRAVENOUS; SUBCUTANEOUS at 23:14

## 2021-04-17 RX ADMIN — FUROSEMIDE 80 MG: 10 INJECTION, SOLUTION INTRAMUSCULAR; INTRAVENOUS at 10:22

## 2021-04-17 RX ADMIN — CARVEDILOL 6.25 MG: 6.25 TABLET, FILM COATED ORAL at 10:21

## 2021-04-17 RX ADMIN — PROMETHAZINE HYDROCHLORIDE 12.5 MG: 25 TABLET ORAL at 23:08

## 2021-04-17 RX ADMIN — INSULIN GLARGINE 25 UNITS: 100 INJECTION, SOLUTION SUBCUTANEOUS at 08:57

## 2021-04-17 RX ADMIN — ASPIRIN 81 MG: 81 TABLET, CHEWABLE ORAL at 08:49

## 2021-04-17 ASSESSMENT — PAIN SCALES - GENERAL
PAINLEVEL_OUTOF10: 0
PAINLEVEL_OUTOF10: 7

## 2021-04-17 NOTE — PROGRESS NOTES
Admit weight: Weight: 203 lb 4 oz (92.2 kg)      Temperature range over 24hrs:   Temp  Av.7 °F (36.5 °C)  Min: 97 °F (36.1 °C)  Max: 98.1 °F (36.7 °C)  Current Respiratory Rate:  Resp: 16  Current Pulse:  Pulse: 105  Current Blood Pressure:  BP: 91/75  24hr Blood Pressure Range:  Systolic (42XQV), SHD:122 , Min:91 , ZBW:614   ; Diastolic (34PHJ), TSR:12, Min:66, Max:76    Current Pulse Oximetry:  SpO2: 94 % RA    No intake or output data in the 24 hours ending 21 0918    Telemetry monitor: AF VR 110s    Physical Exam:  General:  Awake, alert, NAD  Skin:  Warm and dry  Neck:  No JVD  Chest:  Exp wheezing billie UL to auscultation, respiration easy  Cardiovascular:  IRRR 120 S1S2 no murmur to auscultation  Abdomen: Bowel sounds normal, abd firm, non-tender  Extremities:  billie LE edema +2  : unremarkable      Imaging    Echo:   Summary   Dobutamine echocardiogram for aortic stenosis. Very technically limited exam due to atrial fibrillation. Baseline echocardiogram shows severe left ventricular dysfunction with   anterior, lateral and apical hypokinesis with an ejection fraction of 20-25   %. There is no improvement in wall motion with low or high dose dobutamine   suggestive of nonviable myocardium. Mild prosthetic aortic valve stenosis with a mean gradient of 16mmHg and   peak velocity of 2.47m/s at rest. After dobutamine stress the mean AV   gradient rises to 20mmHg with 20mcg of dobutamine consistent with mild to   moderate aortic stenosis.    Prosthetic mitral valve with a mean gradient of 7mmHg       Lab Review     Renal Profile:   Lab Results   Component Value Date    CREATININE 1.5 2021    BUN 24 2021     2021    K 3.9 2021     2021    CO2 25 2021     CBC:    Lab Results   Component Value Date    WBC 4.0 2021    RBC 3.68 2021    HGB 10.0 2021    HCT 31.6 2021    MCV 85.8 2021    RDW 16.8 2021    PLT 233 04/16/2021     BNP:    Lab Results   Component Value Date     09/02/2020     Fasting Lipid Panel:    Lab Results   Component Value Date    CHOL 149 12/23/2020    HDL 40 12/23/2020    TRIG 94 03/03/2021     Cardiac Enzymes:    Lab Results   Component Value Date    TROPONINI 0.02 04/15/2021     PT/ INR   Lab Results   Component Value Date    INR 2.21 04/17/2021    INR 1.91 04/16/2021    INR 1.99 04/15/2021    PROTIME 25.9 04/17/2021    PROTIME 22.3 04/16/2021    PROTIME 23.2 04/15/2021     PTT No results found for: PTT   Lab Results   Component Value Date    MG 2.20 04/16/2021      Lab Results   Component Value Date    TSH 1.90 04/01/2021       Assessment/Plan:     Patient Active Problem List   Diagnosis    Long term current use of anticoagulant    Hypercholesteremia    Generalized osteoarthrosis, involving multiple sites    Eosinophilic gastritis    [facility]ox[facility]ra[facility]ricular [facility]cardia) [facility]    B12 deficiency    History of pulmonary embolism    Multiple pulmonary nodules    Type 2 diabetes mellitus with hyperglycemia, with long-term current use of insulin ([facility])    Asthma    Pneumoperitoneum    PA[facility] fibrillation) [facility] [address]    Hypertension    Cardiac arrhythmia    Encounter for loop recorder check    Coronary artery disease due to lipid rich plaque    Nonrheumatic mitral valve regurgitation    S/P CABG x 3    Goiter    Primary osteoarthritis of both knees    Splenic infarct    Obesity, Class I, BMI 30-34.9    Chronic combined systolic (congestive) and diastolic (congestive) heart failure (HCC)    Thoracic degenerative disc disease    Persistent atrial fibrillation (HCC)    S/P MVR (mitral valve repair)    Ischemic cardiomyopathy    Occlusion and stenosis of bilateral carotid arteries    Pneumatosis intestinalis of small intestine    Aortic valve stenosis    Deep vein thrombosis (DVT) of non-extremity vein    Acute on chronic systolic heart failure due to valvular disease ([facility] [address]    Stage 3 chronic kidney disease    Coronary artery disease involving native heart without angina pectoris    Hypotension      Assessment:    1. Atrial fibrillation with rapid ventricular response (HCC)   ~VR remains 110s ;   ~started on amiodarone load at 400 mg bid   ~coreg 6.25 mg increased from qd to bid > dose held this am with low BP which likely is from AF VR  ~hx of WAYNE clip & MAZE '18; s/p ablation '19   2. Warfarin anticoagulation   ~managed by pharmacist : INR therapeutic this am   3. Chest pain, unspecified type   ~possibly d/t AF uncontrolled VR  ~hx of CABG   4. Dyspnea on exertion     5. Chronic systolic HF    ~remains volume overload with persistent edema; no crackles to ausculation    ~wts and output not currently being tracked   ~spironolactone 50 mg daily with first dose today   ~IV lasix 80 mg daily    ~hx of mild-mod AS by echo Nov ; plan to repeat echo once HR better controlled      Plan:  1.  need to give coreg bid as prescribed to help with AF VR control  Continue amiodarone   Plan cardioversion 4/19  2.   Gentle diuresis, fluid overload which is mild is due to atrial fibrillation  Continue IV lasix another day as edema appears to have worsened compared to previous assessments

## 2021-04-17 NOTE — PROGRESS NOTES
[facility]    4/17/2021 12:09 PM        Name: Lexis Padron Admitted: 4/15/2021  Primary Care Provider: Richard Miranda MD (Tel: [phone])            Subjective:       In chair feeling better no chest pain no shortness of breath no fever chills  Reviewed interval ancillary notes    Current Medications  metoprolol tartrate (LOPRESSOR) tablet 25 mg, BID  furosemide (LASIX) injection 80 mg, Daily  spironolactone (ALDACTONE) tablet 50 mg, Daily  albuterol (PROVENTIL) nebulizer solution 2.5 mg, Q6H PRN  aspirin chewable tablet 81 mg, Daily  gabapentin (NEURONTIN) capsule 300 mg, Nightly  insulin glargine (LANTUS;BASAGLAR) injection pen 25 Units, Daily  montelukast (SINGULAIR) tablet 10 mg, Nightly  pantoprazole (PROTONIX) tablet 40 mg, QAM AC  oxyCODONE (ROXICODONE) immediate release tablet 5 mg, Q6H PRN  sodium chloride flush 0.9 % injection 5-40 mL, 2 times per day  sodium chloride flush 0.9 % injection 5-40 mL, PRN  0.9 % sodium chloride infusion, PRN  promethazine (PHENERGAN) tablet 12.5 mg, Q6H PRN    Or  ondansetron (ZOFRAN) injection 4 mg, Q6H PRN  polyethylene glycol (GLYCOLAX) packet 17 g, Daily PRN  acetaminophen (TYLENOL) tablet 650 mg, Q6H PRN    Or  acetaminophen (TYLENOL) suppository 650 mg, Q6H PRN  insulin lispro (1 Unit Dial) 0-12 Units, TID WC  insulin lispro (1 Unit Dial) 0-6 Units, Nightly  glucose (GLUTOSE) 40 % oral gel 15 g, PRN  dextrose 50 % IV solution, PRN  glucagon (rDNA) injection 1 mg, PRN  dextrose 5 % solution, PRN  [START ON 4/19/2021] warfarin (COUMADIN) tablet 2.5 mg, Once per day on Mon  warfarin (COUMADIN) tablet 5 mg, Once per day on Sun Tue Wed Thu Fri Sat  amiodarone (CORDARONE) tablet 400 mg, BID        Objective:  BP 91/75   Pulse 105   Temp 97.5 °F (36.4 °C) (Tympanic)   Resp 16   Ht 5' 8\" (1.727 m)   Wt 203 lb 4 oz (92.2 kg)   SpO2 94%   BMI 30.90 kg/m²   No intake or output data in the 24 hours ending 04/17/21 1209   Wt Readings from Last 3 Encounters:   04/16/21 203 lb 4 oz (92.2 kg)   04/13/21 205 lb (93 kg)   04/02/21 206 lb 9.6 oz (93.7 kg)       General appearance:  Appears comfortable. AAOx3  HEENT: atraumatic, Pupils equal, muscous membranes moist, no masses appreciated  Cardiovascular irregulary irregular no murmurs appreciated  Respiratory: CTAB no wheezing  Gastrointestinal: Abdomen soft, non-tender, BS+  EXT: 1+ edema  Neurology: no gross focal deficts  Psychiatry: Appropriate affect. Not agitated  Skin: Warm, dry, no rashes appreciated    Labs and Tests:  CBC:   Recent Labs     04/15/21  1135 04/16/21  0744   WBC 5.6 4.0   HGB 10.6* 10.0*    233     BMP:    Recent Labs     04/15/21  1135 04/16/21  0744 04/17/21  0420    141 136   K 3.4* 3.3* 3.9    102 100   CO2 29 29 25   BUN 22* 21* 24*   CREATININE 1.1 1.6* 1.5*   GLUCOSE 138* 95 111*     Hepatic:   Recent Labs     04/15/21  1135 04/15/21  1806   AST 16 15   ALT 11 10   BILITOT 0.7 0.6   ALKPHOS 242* 215*     XR CHEST PORTABLE   Final Result   No acute cardiopulmonary disease. Stable cardiomegaly with no evidence of   overt failure. Recent imaging reviewed    Problem List  Active Problems:    PAF (paroxysmal atrial fibrillation) (HCC)  Resolved Problems:    * No resolved hospital problems.  *          Assessment/Plan:   PAf with rvr  -improved on amiodarone load, cardioversion on Monday per ep  -- patient hypotensive will change coreg to lopressor      Chest pain likely secondary   - troponin stable, cards on board     Acute on chronic systolic heart failure: ef 20-25  - home meds  - started back on lasix 80mg iv per cardiology    TORIBIO: Patient started back on 80 mg IV Lasix by cardiology will closely monitor renal function if any deterioration further we will have to consider holding Lasix and get nephrology input       Dm2: lantus and SSI     Hypokalemia: improved     DVT prophylaxis coumadin  Code status full code        Vanessa Ferraro MD   4/17/2021 12:09 PM

## 2021-04-17 NOTE — PROGRESS NOTES
Pharmacy to Dose Warfarin    Pharmacy consulted to dose warfarin for DVT/Afib. INR Goal: 2-3    INR today: 2.21    Assessment/Plan:  - INR is therapeutic today. - Will continue home dose of warfarin 5 mg today. Monitor need for dose adjustment with amiodarone. - Daily INR ordered. Pharmacy will continue to follow.     Brenda PizarroD  PGY1 Pharmacy Resident  [id number]

## 2021-04-18 LAB
ANION GAP SERPL CALCULATED.3IONS-SCNC: 11 MMOL/L (ref 3–16)
ANION GAP SERPL CALCULATED.3IONS-SCNC: 13 MMOL/L (ref 3–16)
BUN BLDV-MCNC: 32 MG/DL (ref 7–20)
BUN BLDV-MCNC: 36 MG/DL (ref 7–20)
CALCIUM SERPL-MCNC: 8.7 MG/DL (ref 8.3–10.6)
CALCIUM SERPL-MCNC: 8.7 MG/DL (ref 8.3–10.6)
CHLORIDE BLD-SCNC: 100 MMOL/L (ref 99–110)
CHLORIDE BLD-SCNC: 96 MMOL/L (ref 99–110)
CO2: 22 MMOL/L (ref 21–32)
CO2: 24 MMOL/L (ref 21–32)
CREAT SERPL-MCNC: 1.9 MG/DL (ref 0.6–1.2)
CREAT SERPL-MCNC: 2.2 MG/DL (ref 0.6–1.2)
GFR AFRICAN AMERICAN: 26
GFR AFRICAN AMERICAN: 31
GFR NON-AFRICAN AMERICAN: 22
GFR NON-AFRICAN AMERICAN: 26
GLUCOSE BLD-MCNC: 166 MG/DL (ref 70–99)
GLUCOSE BLD-MCNC: 170 MG/DL (ref 70–99)
GLUCOSE BLD-MCNC: 173 MG/DL (ref 70–99)
GLUCOSE BLD-MCNC: 183 MG/DL (ref 70–99)
GLUCOSE BLD-MCNC: 184 MG/DL (ref 70–99)
GLUCOSE BLD-MCNC: 215 MG/DL (ref 70–99)
INR BLD: 2.24 (ref 0.86–1.14)
PERFORMED ON: ABNORMAL
POTASSIUM REFLEX MAGNESIUM: 4 MMOL/L (ref 3.5–5.1)
POTASSIUM SERPL-SCNC: 4.1 MMOL/L (ref 3.5–5.1)
PRO-BNP: ABNORMAL PG/ML (ref 0–449)
PROTHROMBIN TIME: 26.2 SEC (ref 10–13.2)
SODIUM BLD-SCNC: 131 MMOL/L (ref 136–145)
SODIUM BLD-SCNC: 135 MMOL/L (ref 136–145)

## 2021-04-18 PROCEDURE — 6370000000 HC RX 637 (ALT 250 FOR IP): Performed by: INTERNAL MEDICINE

## 2021-04-18 PROCEDURE — 99232 SBSQ HOSP IP/OBS MODERATE 35: CPT | Performed by: NURSE PRACTITIONER

## 2021-04-18 PROCEDURE — 6370000000 HC RX 637 (ALT 250 FOR IP): Performed by: NURSE PRACTITIONER

## 2021-04-18 PROCEDURE — 2060000000 HC ICU INTERMEDIATE R&B

## 2021-04-18 PROCEDURE — 85610 PROTHROMBIN TIME: CPT

## 2021-04-18 PROCEDURE — 80048 BASIC METABOLIC PNL TOTAL CA: CPT

## 2021-04-18 PROCEDURE — 6360000002 HC RX W HCPCS: Performed by: INTERNAL MEDICINE

## 2021-04-18 PROCEDURE — 36415 COLL VENOUS BLD VENIPUNCTURE: CPT

## 2021-04-18 PROCEDURE — 83880 ASSAY OF NATRIURETIC PEPTIDE: CPT

## 2021-04-18 PROCEDURE — 2580000003 HC RX 258: Performed by: INTERNAL MEDICINE

## 2021-04-18 RX ORDER — SPIRONOLACTONE 25 MG/1
25 TABLET ORAL DAILY
Status: DISCONTINUED | OUTPATIENT
Start: 2021-04-18 | End: 2021-04-18

## 2021-04-18 RX ORDER — INSULIN LISPRO 100 [IU]/ML
3 INJECTION, SOLUTION INTRAVENOUS; SUBCUTANEOUS
Status: DISCONTINUED | OUTPATIENT
Start: 2021-04-18 | End: 2021-04-26

## 2021-04-18 RX ADMIN — PROMETHAZINE HYDROCHLORIDE 12.5 MG: 25 TABLET ORAL at 22:18

## 2021-04-18 RX ADMIN — WARFARIN SODIUM 5 MG: 5 TABLET ORAL at 17:38

## 2021-04-18 RX ADMIN — AMIODARONE HYDROCHLORIDE 400 MG: 200 TABLET ORAL at 22:18

## 2021-04-18 RX ADMIN — INSULIN GLARGINE 25 UNITS: 100 INJECTION, SOLUTION SUBCUTANEOUS at 09:37

## 2021-04-18 RX ADMIN — INSULIN LISPRO 1 UNITS: 100 INJECTION, SOLUTION INTRAVENOUS; SUBCUTANEOUS at 22:20

## 2021-04-18 RX ADMIN — INSULIN LISPRO 3 UNITS: 100 INJECTION, SOLUTION INTRAVENOUS; SUBCUTANEOUS at 12:28

## 2021-04-18 RX ADMIN — Medication 10 ML: at 22:24

## 2021-04-18 RX ADMIN — Medication 10 ML: at 09:44

## 2021-04-18 RX ADMIN — OXYCODONE 5 MG: 5 TABLET ORAL at 07:51

## 2021-04-18 RX ADMIN — AMIODARONE HYDROCHLORIDE 400 MG: 200 TABLET ORAL at 09:32

## 2021-04-18 RX ADMIN — INSULIN LISPRO 2 UNITS: 100 INJECTION, SOLUTION INTRAVENOUS; SUBCUTANEOUS at 09:37

## 2021-04-18 RX ADMIN — PANTOPRAZOLE SODIUM 40 MG: 40 TABLET, DELAYED RELEASE ORAL at 07:46

## 2021-04-18 RX ADMIN — GABAPENTIN 300 MG: 300 CAPSULE ORAL at 22:17

## 2021-04-18 RX ADMIN — INSULIN LISPRO 2 UNITS: 100 INJECTION, SOLUTION INTRAVENOUS; SUBCUTANEOUS at 17:44

## 2021-04-18 RX ADMIN — INSULIN LISPRO 3 UNITS: 100 INJECTION, SOLUTION INTRAVENOUS; SUBCUTANEOUS at 17:44

## 2021-04-18 RX ADMIN — METOPROLOL SUCCINATE 25 MG: 25 TABLET, EXTENDED RELEASE ORAL at 09:41

## 2021-04-18 RX ADMIN — ASPIRIN 81 MG: 81 TABLET, CHEWABLE ORAL at 09:32

## 2021-04-18 RX ADMIN — INSULIN LISPRO 2 UNITS: 100 INJECTION, SOLUTION INTRAVENOUS; SUBCUTANEOUS at 12:27

## 2021-04-18 RX ADMIN — ONDANSETRON 4 MG: 2 INJECTION INTRAMUSCULAR; INTRAVENOUS at 17:38

## 2021-04-18 RX ADMIN — MONTELUKAST SODIUM 10 MG: 10 TABLET, FILM COATED ORAL at 22:18

## 2021-04-18 RX ADMIN — SPIRONOLACTONE 25 MG: 25 TABLET ORAL at 09:32

## 2021-04-18 RX ADMIN — OXYCODONE 5 MG: 5 TABLET ORAL at 22:18

## 2021-04-18 ASSESSMENT — ENCOUNTER SYMPTOMS
VOMITING: 0
SHORTNESS OF BREATH: 0
ABDOMINAL PAIN: 0
EYE REDNESS: 0
NAUSEA: 0
CHEST TIGHTNESS: 0
SORE THROAT: 0
COUGH: 0
WHEEZING: 0
EYE PAIN: 0
CONSTIPATION: 1
BACK PAIN: 0

## 2021-04-18 ASSESSMENT — PAIN SCALES - GENERAL: PAINLEVEL_OUTOF10: 0

## 2021-04-18 NOTE — CONSULTS
Nephrology Consult Note  [phone]   ://[facility].        Reason for Consult:  Acute Kidney injury      HISTORY OF PRESENT ILLNESS:                 78 yo woman with h/o CKD III, Hypertension, DM II, CAD/CABG 8/2018, s/p MVR bioprosthetic,  Afib on warfarin, moderate Muriel/o DVT/PE on warfarin, GERD presented to ER on 4/15 with chest pain, palpitations  and sob. Found to be in Afib with RVR with HRs of 140s on arrival. Required cardizem gtt. Seen by cardiology and started on amiodarone, plans to do cardioversion possibly on 4/19/2021.   A Dobutamine echo in Nov 2020 with LVEF of 20-25%    Pt seen and examined  Base-line creatinine around 1  Creatinine on admission was 1.1, peaked at 1.9  Denies NSAID usage  No urinary problems    Past Medical History:        Diagnosis Date    Asthma     Atrial fibrillation (HCC)     Eosinophilia     Hemoptysis     HIGH CHOLESTEROL     Hx of blood clots     Hypertension     Irregular heart beat     Other specified gastritis without mention of hemorrhage     Palpitations     Skin cancer     basal and squamous    Type II or unspecified type diabetes mellitus without mention of complication, not stated as uncontrolled        Past Surgical History:        Procedure Laterality Date    BRONCHOSCOPY  07/18/2016    Dr. Claudene Bloodgood - brushings from [address] [place] [address]  08/16/2018    Dr. Ashish Chavez  02/07/2017    Dr. Jose Moreira - sigmoid diverticulosis, polypectomies x3    COLONOSCOPY  01/17/2014    Dr. Jose Moreira - sigmoid diverticulosis, polypectomies x3, internal hemorrhoids    COLONOSCOPY  10/10/2018    w/biopsy performed by Toi Johns MD at [place] [address] N/A 8/7/2020    COLONOSCOPY DIAGNOSTIC performed by Claudene Bloodgood, MD at [address]  08/21/2018    Dr. Filomena Dinh - x3 (LIMA-LAD, L SV-D1-PLV) modified BL MAZE procedure w/obliteration of WAYNE using 45mm AtriClip    HYSTERECTOMY      INSERTABLE CARDIAC MONITOR Left 08/16/2018    Dr. Mago Burnette - Carmendanisha Maureen SN# [id number] [employer]    MITRAL VALVE REPLACEMENT  08/21/2018    Dr. Long Enter - 27mm Medtronic Cinch tissue valve    PAIN MANAGEMENT PROCEDURE N/A 12/18/2020    C6-C7 MIDLINE  EPIDURAL STEROID INJECTION WITH FLUOROSCOPY performed by Elijah Paul MD at [address] Bilateral 1/18/2021    BILATERAL T11 TRANSFORAMINAL EPIDURAL STEROID INJECTION WITH FLUOROSCOPY performed by Elijah Paul MD at [address] TRANSESOPHAGEAL ECHOCARDIOGRAM  08/21/2018    during CABG/MVR    TUNNELED VENOUS CATHETER PLACEMENT Left 08/23/2018    Dr. Levy Osborn for HD---since removed    UPPER GASTROINTESTINAL ENDOSCOPY N/A 10/10/2018    w/biopsy performed by Norma Garcia MD at [address]         Current Medications:    No current facility-administered medications on file prior to encounter. Current Outpatient Medications on File Prior to Encounter   Medication Sig Dispense Refill    spironolactone (ALDACTONE) 25 MG tablet Take 2 tablets by mouth daily 180 tablet 0    amoxicillin-clavulanate (AUGMENTIN) 875-125 MG per tablet Take 1 tablet by mouth 2 times daily for 10 days 20 tablet 0    torsemide (DEMADEX) 20 MG tablet 40mg morning, 40mg afternoon, 20mg evening 120 tablet 3    carvedilol (COREG) 6.25 MG tablet Take 1 tablet by mouth daily 180 tablet 3    gabapentin (NEURONTIN) 300 MG capsule Take 1 capsule by mouth nightly for 90 days.  Intended supply: 30 days 90 capsule 0    insulin glargine (LANTUS SOLOSTAR) 100 UNIT/ML injection pen INJECT 26 UNITS IN THE MORNING AND 18 UNITS AT BEDTIME 90 mL 1    exenatide (BYETTA 10 MCG PEN) 10 MCG/0.04ML injection Inject 0.04 mLs into the skin 2 times daily (with meals) 3 pen 1    OXYCODONE HCL PO Take 10 mg by mouth As of 1/21/2021,  states patient is taking 10mg with edge being taken off her pain after 3 hours.  ACETAMINOPHEN PO Take 500 mg by mouth every 6 hours as needed       albuterol sulfate  (90 Base) MCG/ACT inhaler USE 2 INHALATIONS EVERY 6 HOURS AS NEEDED FOR WHEEZING 25.5 g 2    potassium chloride (KLOR-CON M) 10 MEQ extended release tablet TAKE 1 TABLET DAILY 90 tablet 1    albuterol (PROVENTIL) (2.5 MG/3ML) 0.083% nebulizer solution Take 3 mLs by nebulization every 6 hours as needed for Wheezing 120 each 3    polyethylene glycol (GLYCOLAX) 17 GM/SCOOP powder Take 17 g by mouth every other day       omeprazole (PRILOSEC) 20 MG delayed release capsule Take 20 mg by mouth daily      ondansetron (ZOFRAN) 4 MG tablet Take 1 tablet by mouth 3 times daily as needed for Nausea or Vomiting 30 tablet 0    warfarin (COUMADIN) 5 MG tablet TAKE 1 TABLET DAILY (Patient taking differently: Take 5 mg by mouth daily Managed by PCP) 100 tablet 4    montelukast (SINGULAIR) 10 MG tablet TAKE 1 TABLET NIGHTLY 90 tablet 4    aspirin 81 MG chewable tablet Take 1 tablet by mouth daily 30 tablet 3    vitamin B-12 500 MCG tablet Take 1 tablet by mouth daily 30 tablet 3    fluconazole (DIFLUCAN) 100 MG tablet Take 1 tablet by mouth daily for 7 days Take 5 mg coumadin when taking diflucan 7 tablet 0    oxyCODONE (ROXICODONE) 5 MG immediate release tablet Take 1 tablet by mouth every 6 hours as needed for Pain for up to 30 days. 100 tablet 0    blood glucose test strips (FREESTYLE LITE) strip USE TO TEST FOUR TIMES A  strip 4    FreeStyle Lancets MISC 1 each by Does not apply route 4 times daily 200 each 5    Blood Glucose Monitoring Suppl (EMBRACE PRO GLUCOSE METER) GAETANO 1 Device by Does not apply route 4 times daily 1 Device 0    HUMALOG KWIKPEN 100 UNIT/ML SOPN TAKE AS INSTRUCTED BY YOUR PRESCRIBER (Patient taking differently:  Only if high blood sugar-has not been using) 15 mL 8    nystatin (MYCOSTATIN) 642547 UNIT/ML suspension TAKE ONE TEASPOONFUL BY MOUTH FOUR TIMES A DAY FOR 5 DAYS 1 Bottle 2    BD PEN NEEDLE JANNET U/F 32G X 4 MM MISC USE 1 PEN NEEDLE FOUR TIMES A  each 3       Allergies:  [id number] [fluocinolone], Ciprofloxacin, Diovan [valsartan], Flagyl [metronidazole], Metformin and related [metformin and related], Benazepril, Morphine, Saxagliptin, and Levaquin [levofloxacin]    Social History:    Social History     Socioeconomic History    Marital status:      Spouse name: Not on file    Number of children: Not on file    Years of education: Not on file    Highest education level: Not on file   Occupational History    Not on file   Social Needs    Financial resource strain: Not on file    Food insecurity     Worry: Not on file     Inability: Not on file    Transportation needs     Medical: Not on file     Non-medical: Not on file   Tobacco Use    Smoking status: Never Smoker    Smokeless tobacco: Never Used   Substance and Sexual Activity    Alcohol use: No    Drug use: No    Sexual activity: Not on file   Lifestyle    Physical activity     Days per week: Not on file     Minutes per session: Not on file    Stress: Not on file   Relationships    Social connections     Talks on phone: Not on file     Gets together: Not on file     Attends Gnosticist service: Not on file     Active member of club or organization: Not on file     Attends meetings of clubs or organizations: Not on file     Relationship status: Not on file    Intimate partner violence     Fear of current or ex partner: Not on file     Emotionally abused: Not on file     Physically abused: Not on file     Forced sexual activity: Not on file   Other Topics Concern    Not on file   Social History Narrative    Not on file       Family History:       Problem Relation Age of Onset    Cancer Father     Asthma Mother     Hypertension Mother     Heart Disease Mother     High Blood Pressure Mother            Review of Systems   Constitutional: Negative for activity change, appetite change, chills and fever. HENT: Negative for ear discharge, ear pain, sneezing and sore throat. Eyes: Negative for pain and redness. Respiratory: Negative for cough, chest tightness, shortness of breath and wheezing. Cardiovascular: Positive for leg swelling. Negative for chest pain and palpitations. Gastrointestinal: Positive for constipation. Negative for abdominal pain, nausea and vomiting. Genitourinary: Negative for difficulty urinating, dysuria, hematuria and urgency. Musculoskeletal: Negative for arthralgias and back pain. Skin: Negative for pallor and rash. Neurological: Negative for dizziness, tremors, seizures and headaches. Psychiatric/Behavioral: Negative for confusion and hallucinations. PHYSICAL EXAM:      Vitals:    /70   Pulse 97   Temp 97.9 °F (36.6 °C) (Oral)   Resp 16   Ht 5' 8\" (1.727 m)   Wt 203 lb 4 oz (92.2 kg)   SpO2 96%   BMI 30.90 kg/m²   No intake/output data recorded. I/O this shift:  In: 10 [I.V.:10]  Out: -     Physical Exam:    General : AAOx3, not in pain or respiratory distress, resting in bed  HEENT : normocephalic, atraumatic, mucosa moist, no palor or icterus  CVS: S1 S2 normal, regular rhythm, no murmurs or rubs. Lungs: Clear, no wheezing or crackles. Abd: Soft, bowel sounds normal, non-tender. Ext: 1-2+ LE edema, no cyanosis  Skin: Warm. No rashes appreciated. : bladder non-distended, no tenderness over the bladder  Neuro: Alert and oriented x 3, nonfocal.  Joints: No erythema noted over joints.       DATA:    CBC with Differential:    Lab Results   Component Value Date    WBC 4.0 04/16/2021    RBC 3.68 04/16/2021    HGB 10.0 04/16/2021    HCT 31.6 04/16/2021     04/16/2021    MCV 85.8 04/16/2021    MCH 27.1 04/16/2021    MCHC 31.6 04/16/2021    RDW 16.8 04/16/2021    SEGSPCT 64.1 09/02/2020    BANDSPCT 1 01/14/2019    LYMPHOPCT 15.1 04/16/2021    MONOPCT 14.7 04/16/2021    BASOPCT 0.8 04/16/2021    MONOSABS 0.6 04/16/2021 LYMPHSABS 0.6 04/16/2021    EOSABS 0.4 04/16/2021    BASOSABS 0.0 04/16/2021     BMP:    Lab Results   Component Value Date     04/18/2021    K 4.0 04/18/2021     04/18/2021    CO2 24 04/18/2021    BUN 32 04/18/2021    LABALBU 2.7 04/15/2021    CREATININE 1.9 04/18/2021    CALCIUM 8.7 04/18/2021    GFRAA 31 04/18/2021    LABGLOM 26 04/18/2021    LABGLOM 45 12/06/2018    GLUCOSE 215 04/18/2021     Ionized Calcium:  No results found for: IONCA  Magnesium:    Lab Results   Component Value Date    MG 2.20 04/16/2021     Phosphorus:    Lab Results   Component Value Date    PHOS 3.1 03/10/2021     Last 3 Troponin:    Lab Results   Component Value Date    TROPONINI 0.02 04/15/2021    TROPONINI 0.02 04/15/2021    TROPONINI <0.01 05/10/2020     U/A:    Lab Results   Component Value Date    COLORU Yellow 04/15/2021    PROTEINU Negative 04/15/2021    PHUR 6.5 04/15/2021    WBCUA 3 04/15/2021    RBCUA 2 04/15/2021    YEAST neg 02/12/2021    BACTERIA trace 02/12/2021    BACTERIA 1+ 05/11/2020    CLARITYU Clear 04/15/2021    SPECGRAV 1.020 04/15/2021    LEUKOCYTESUR Negative 04/15/2021    UROBILINOGEN 0.2 04/15/2021    BILIRUBINUR Negative 04/15/2021    BLOODU TRACE-INTACT 04/15/2021    GLUCOSEU Negative 04/15/2021     Renal US 3/2021    The right kidney measures 10.0 x 6.6 x 5.4 cm in length and the left kidney   measures 9.9 x 5.4 x 4.5 cm in length.       Kidneys demonstrate normal cortical echogenicity.  No evidence of   hydronephrosis or intrarenal stones.           Bladder:       Bladder is incompletely distended and otherwise unremarkable in appearance. ASSESSMENT/PLAN:      1. TORIBIO on CKD IIIa  Base-line creatinine around 1  Creatinine on admission was 1.1, and has been worsening  Possibly pre-renal vs ATN in the setting of Afib with RVR/MEGHAN blockade  Would hold spironolactone for now  Daily renal panel  2.  CHF  Dobutamine stress test in 11/2020 with LVEF of 20-25%  Hold spironolactone  Ok to hold lasix for now but needs diuretics soon  3. Atrial fibrillation with RVR  On BB and amiodarone  Plans for CV tomorrow. 4. CKD IIIa  Base-lien creatinine around 1  2/2021- no evidence of significant proteinuria or blood. Thank you for allowing me to participate in the care of this patient. I will continue to follow along. Please call with questions.     Sejal Jackson MD.  Office Phone : [phone]  4/18/2021

## 2021-04-18 NOTE — PROGRESS NOTES
[facility]    4/18/2021 12:24 PM        Name: Army Goltz .               Admitted: 4/15/2021  Primary Care Provider: Marylene Hartigan, MD (Tel: [phone])            Subjective:    Sitting in chair having no shortness of breath or chest space still in A. fib still has some lower extremity edema blood pressure has been labile overnight  Reviewed interval ancillary notes    Current Medications  spironolactone (ALDACTONE) tablet 25 mg, Daily  insulin lispro (1 Unit Dial) 3 Units, TID WC  metoprolol succinate (TOPROL XL) extended release tablet 25 mg, Daily  [Held by provider] furosemide (LASIX) injection 80 mg, Daily  albuterol (PROVENTIL) nebulizer solution 2.5 mg, Q6H PRN  aspirin chewable tablet 81 mg, Daily  gabapentin (NEURONTIN) capsule 300 mg, Nightly  insulin glargine (LANTUS;BASAGLAR) injection pen 25 Units, Daily  montelukast (SINGULAIR) tablet 10 mg, Nightly  pantoprazole (PROTONIX) tablet 40 mg, QAM AC  oxyCODONE (ROXICODONE) immediate release tablet 5 mg, Q6H PRN  sodium chloride flush 0.9 % injection 5-40 mL, 2 times per day  sodium chloride flush 0.9 % injection 5-40 mL, PRN  0.9 % sodium chloride infusion, PRN  promethazine (PHENERGAN) tablet 12.5 mg, Q6H PRN    Or  ondansetron (ZOFRAN) injection 4 mg, Q6H PRN  polyethylene glycol (GLYCOLAX) packet 17 g, Daily PRN  acetaminophen (TYLENOL) tablet 650 mg, Q6H PRN    Or  acetaminophen (TYLENOL) suppository 650 mg, Q6H PRN  insulin lispro (1 Unit Dial) 0-12 Units, TID WC  insulin lispro (1 Unit Dial) 0-6 Units, Nightly  glucose (GLUTOSE) 40 % oral gel 15 g, PRN  dextrose 50 % IV solution, PRN  glucagon (rDNA) injection 1 mg, PRN  dextrose 5 % solution, PRN  [START ON 4/19/2021] warfarin (COUMADIN) tablet 2.5 mg, Once per day on Mon  warfarin (COUMADIN) tablet 5 mg, Once per day on Sun Tue Wed Thu Fri Sat  amiodarone (CORDARONE) tablet 400 mg, BID        Objective:  /75   Pulse 105   Temp 97.7 °F (36.5 °C) (Oral)   Resp 16   Ht 5' 8\" (1.727 m)   Wt 203 lb 4 oz (92.2 kg)   SpO2 95%   BMI 30.90 kg/m²     Intake/Output Summary (Last 24 hours) at 4/18/2021 1224  Last data filed at 4/18/2021 0944  Gross per 24 hour   Intake 10 ml   Output --   Net 10 ml      Wt Readings from Last 3 Encounters:   04/16/21 203 lb 4 oz (92.2 kg)   04/13/21 205 lb (93 kg)   04/02/21 206 lb 9.6 oz (93.7 kg)       General appearance:  Appears comfortable. AAOx3  HEENT: atraumatic, Pupils equal, muscous membranes moist, no masses appreciated  Cardiovascular irregulary irregular no murmurs appreciated  Respiratory: CTAB no wheezing  Gastrointestinal: Abdomen soft, non-tender, BS+  EXT: 1+ edema  Neurology: no gross focal deficts  Psychiatry: Appropriate affect. Not agitated  Skin: Warm, dry, no rashes appreciated    Labs and Tests:  CBC:   Recent Labs     04/16/21  0744   WBC 4.0   HGB 10.0*        BMP:    Recent Labs     04/16/21  0744 04/17/21  0420 04/18/21  0432    136 135*   K 3.3* 3.9 4.0    100 100   CO2 29 25 24   BUN 21* 24* 32*   CREATININE 1.6* 1.5* 1.9*   GLUCOSE 95 111* 215*     Hepatic:   Recent Labs     04/15/21  1806   AST 15   ALT 10   BILITOT 0.6   ALKPHOS 215*     XR CHEST PORTABLE   Final Result   No acute cardiopulmonary disease. Stable cardiomegaly with no evidence of   overt failure. Recent imaging reviewed    Problem List  Active Problems:    PAF (paroxysmal atrial fibrillation) (HCC)  Resolved Problems:    * No resolved hospital problems.  *          Assessment/Plan:   PAf with rvr  -improved on amiodarone load, cardioversion on Monday per ep  -- bp slightly better today attempt lopressor     Chest pain likely secondary   - troponin stable, cards on board     Acute on chronic systolic heart failure: ef 20-25  - home meds  - hold lasix today hoepfuly can start diuresing if patient back in sinus after cardioversion in am    TORIBIO: lkely secondary to lasix and hypotensin with afib  - nephro consulted  - hold alsix       Dm2 with hyperglycemia: tirate up lantus and SSI        DVT prophylaxis coumadin  Code status full code        Sydney Sotelo MD   4/18/2021 12:24 PM

## 2021-04-18 NOTE — PROGRESS NOTES
[facility]              Progress Note      Admit Date 4/15/2021  HPI: Mary Trujillo is a 77 y.o. female with a past medical history of HTN, HLD, DM, CAD s/p CABG x 3, S/p bioprosthetic MVR, WAYNE clip (8/2018), afib s/p Maze 2018, CMP (EF25-30%) DVT/PE on warfarin and sCHF follows with HF. S/p ablation of afib w/ PVI, roofline, posterior, CTI flutter 10/30/2019. S/p ILR. Hx of Mobitz I AVB and prolonged ME interval. S/p JUDE/CV. Has been treated with amiodarone stopped in 2018. Presented with palpitations and tachycardia, found to be in afib/RVR and started on diltiazem gtt. HF team consulted: 4/16/21:     Interval history: Fluid status : unknown. No weight nor I&O       Amiodarone 400 mg bid       Carvedilol changed to metoprolol succinate d/t low BPs     ~has not been given per BP parameters to hold     AF VR 90s on telemetry        BUN / creatinine : 24 > 32, 1.5 > 1.9 respectively      ~spironolactone 50 mg daily with IV lasix 80 mg daily    Ms. August denies chest pain    Subjective: states does not feel her heart / palpitations. Cannot say that she's SOB but describes as feeling weak.  Could only walk a short way out in the hallway yesterday before needing to rest.        Scheduled Meds:   metoprolol succinate  25 mg Oral Daily    furosemide  80 mg Intravenous Daily    spironolactone  50 mg Oral Daily    aspirin  81 mg Oral Daily    gabapentin  300 mg Oral Nightly    insulin glargine  25 Units Subcutaneous Daily    montelukast  10 mg Oral Nightly    pantoprazole  40 mg Oral QAM AC    sodium chloride flush  5-40 mL Intravenous 2 times per day    insulin lispro  0-12 Units Subcutaneous TID WC    insulin lispro  0-6 Units Subcutaneous Nightly    [START ON 4/19/2021] warfarin  2.5 mg Oral Once per day on Mon    warfarin  5 mg Oral Once per day on Sun Tue Wed Thu Fri Sat    amiodarone  400 mg Oral BID     Continuous Infusions:   sodium chloride      dextrose       PRN Lab Results   Component Value Date    CREATININE 1.9 04/18/2021    BUN 32 04/18/2021     04/18/2021    K 4.0 04/18/2021     04/18/2021    CO2 24 04/18/2021     CBC:    Lab Results   Component Value Date    WBC 4.0 04/16/2021    RBC 3.68 04/16/2021    HGB 10.0 04/16/2021    HCT 31.6 04/16/2021    MCV 85.8 04/16/2021    RDW 16.8 04/16/2021     04/16/2021        Ref.  Range 4/1/2021 07:40 4/15/2021 11:35   Pro-BNP Latest Ref Range: 0 - 449 pg/mL 5,413 (H) 8,766 (H)     Fasting Lipid Panel:    Lab Results   Component Value Date    CHOL 149 12/23/2020    HDL 40 12/23/2020    TRIG 94 03/03/2021     Cardiac Enzymes:    Lab Results   Component Value Date    TROPONINI 0.02 04/15/2021     PT/ INR   Lab Results   Component Value Date    INR 2.24 04/18/2021    INR 2.21 04/17/2021    INR 1.91 04/16/2021    PROTIME 26.2 04/18/2021    PROTIME 25.9 04/17/2021    PROTIME 22.3 04/16/2021     PTT No results found for: PTT   Lab Results   Component Value Date    MG 2.20 04/16/2021      Lab Results   Component Value Date    TSH 1.90 04/01/2021       Assessment/Plan:     Patient Active Problem List   Diagnosis    Long term current use of anticoagulant    Hypercholesteremia    Generalized osteoarthrosis, involving multiple sites    Eosinophilic gastritis    Paroxysmal SVT (supraventricular tachycardia) (HCC)    B12 deficiency    History of pulmonary embolism    Multiple pulmonary nodules    Type 2 diabetes mellitus with hyperglycemia, with long-term current use of insulin (HCC)    Asthma    Pneumoperitoneum    PAF (paroxysmal atrial fibrillation) ([facility] [address]    Hypertension    Cardiac arrhythmia    Encounter for loop recorder check    Coronary artery disease due to lipid rich plaque    Nonrheumatic mitral valve regurgitation    S/P CABG x 3    Goiter    Primary osteoarthritis of both knees    Splenic infarct    Obesity, Class I, BMI 30-34.9    Chronic combined systolic (congestive) and diastolic

## 2021-04-18 NOTE — PLAN OF CARE
Problem: Pain:  Goal: Control of acute pain  Description: Control of acute pain  Outcome: Ongoing     Problem: Bleeding:  Goal: Will show no signs and symptoms of excessive bleeding  Description: Will show no signs and symptoms of excessive bleeding  Outcome: Ongoing     Problem: Cardiovascular  Goal: No DVT, peripheral vascular complications  Outcome: Ongoing  Goal: Hemodynamic stability  Outcome: Ongoing  Goal: Anticoagulate/Hct stable  Outcome: Ongoing  Goal: Agreement to quit smoking  Outcome: Ongoing  Goal: Weight maintained or lost  Outcome: Ongoing  Goal: Understanding of dietary restrictions  Outcome: Ongoing     Problem: Respiratory  Goal: No pulmonary complications  Outcome: Ongoing  Goal: O2 Sat > 90%  Outcome: Ongoing  Goal: Supplemental O2 requirements decreased  Outcome: Ongoing  Goal: Agreement to quit smoking  Outcome: Ongoing  Goal: Pneumonia vaccine at discharge as needed  Outcome: Ongoing     Problem: Falls - Risk of:  Goal: Will remain free from falls  Description: Will remain free from falls  Outcome: Ongoing  Goal: Absence of physical injury  Description: Absence of physical injury  Outcome: Ongoing   Pt stated pain is controlled. Pt shows no sign of bleeding at this time. Pt remains free from falls and physical injury. Pt in a fib with heart rates in low 100s and other vitals stable at this time.

## 2021-04-19 PROBLEM — I50.22 CHRONIC SYSTOLIC CHF (CONGESTIVE HEART FAILURE), NYHA CLASS 3 (HCC): Status: ACTIVE | Noted: 2019-01-15

## 2021-04-19 LAB
ALBUMIN SERPL-MCNC: 3.1 G/DL (ref 3.4–5)
ANION GAP SERPL CALCULATED.3IONS-SCNC: 9 MMOL/L (ref 3–16)
BUN BLDV-MCNC: 36 MG/DL (ref 7–20)
CALCIUM SERPL-MCNC: 8.4 MG/DL (ref 8.3–10.6)
CHLORIDE BLD-SCNC: 101 MMOL/L (ref 99–110)
CO2: 25 MMOL/L (ref 21–32)
CREAT SERPL-MCNC: 1.9 MG/DL (ref 0.6–1.2)
GFR AFRICAN AMERICAN: 31
GFR NON-AFRICAN AMERICAN: 26
GLUCOSE BLD-MCNC: 104 MG/DL (ref 70–99)
GLUCOSE BLD-MCNC: 120 MG/DL (ref 70–99)
GLUCOSE BLD-MCNC: 142 MG/DL (ref 70–99)
GLUCOSE BLD-MCNC: 159 MG/DL (ref 70–99)
GLUCOSE BLD-MCNC: 215 MG/DL (ref 70–99)
INR BLD: 2.75 (ref 0.86–1.14)
PERFORMED ON: ABNORMAL
PHOSPHORUS: 3.4 MG/DL (ref 2.5–4.9)
POTASSIUM REFLEX MAGNESIUM: 4 MMOL/L (ref 3.5–5.1)
PROTHROMBIN TIME: 32.2 SEC (ref 10–13.2)
SODIUM BLD-SCNC: 135 MMOL/L (ref 136–145)

## 2021-04-19 PROCEDURE — 2580000003 HC RX 258: Performed by: INTERNAL MEDICINE

## 2021-04-19 PROCEDURE — 82040 ASSAY OF SERUM ALBUMIN: CPT

## 2021-04-19 PROCEDURE — 2060000000 HC ICU INTERMEDIATE R&B

## 2021-04-19 PROCEDURE — 99233 SBSQ HOSP IP/OBS HIGH 50: CPT | Performed by: NURSE PRACTITIONER

## 2021-04-19 PROCEDURE — 6370000000 HC RX 637 (ALT 250 FOR IP): Performed by: INTERNAL MEDICINE

## 2021-04-19 PROCEDURE — 99233 SBSQ HOSP IP/OBS HIGH 50: CPT | Performed by: CLINICAL NURSE SPECIALIST

## 2021-04-19 PROCEDURE — 84100 ASSAY OF PHOSPHORUS: CPT

## 2021-04-19 PROCEDURE — 36415 COLL VENOUS BLD VENIPUNCTURE: CPT

## 2021-04-19 PROCEDURE — 80048 BASIC METABOLIC PNL TOTAL CA: CPT

## 2021-04-19 PROCEDURE — 85610 PROTHROMBIN TIME: CPT

## 2021-04-19 PROCEDURE — 6360000002 HC RX W HCPCS: Performed by: CLINICAL NURSE SPECIALIST

## 2021-04-19 PROCEDURE — 6370000000 HC RX 637 (ALT 250 FOR IP): Performed by: NURSE PRACTITIONER

## 2021-04-19 RX ORDER — MILRINONE LACTATE 0.2 MG/ML
0.1 INJECTION, SOLUTION INTRAVENOUS CONTINUOUS
Status: DISCONTINUED | OUTPATIENT
Start: 2021-04-19 | End: 2021-05-04

## 2021-04-19 RX ADMIN — ASPIRIN 81 MG: 81 TABLET, CHEWABLE ORAL at 08:39

## 2021-04-19 RX ADMIN — AMIODARONE HYDROCHLORIDE 400 MG: 200 TABLET ORAL at 08:39

## 2021-04-19 RX ADMIN — INSULIN LISPRO 3 UNITS: 100 INJECTION, SOLUTION INTRAVENOUS; SUBCUTANEOUS at 17:35

## 2021-04-19 RX ADMIN — INSULIN LISPRO 1 UNITS: 100 INJECTION, SOLUTION INTRAVENOUS; SUBCUTANEOUS at 21:06

## 2021-04-19 RX ADMIN — Medication 10 ML: at 21:07

## 2021-04-19 RX ADMIN — GABAPENTIN 300 MG: 300 CAPSULE ORAL at 21:06

## 2021-04-19 RX ADMIN — AMIODARONE HYDROCHLORIDE 400 MG: 200 TABLET ORAL at 21:06

## 2021-04-19 RX ADMIN — MONTELUKAST SODIUM 10 MG: 10 TABLET, FILM COATED ORAL at 21:06

## 2021-04-19 RX ADMIN — WARFARIN SODIUM 2.5 MG: 2.5 TABLET ORAL at 17:35

## 2021-04-19 RX ADMIN — INSULIN LISPRO 3 UNITS: 100 INJECTION, SOLUTION INTRAVENOUS; SUBCUTANEOUS at 12:55

## 2021-04-19 RX ADMIN — METOPROLOL SUCCINATE 25 MG: 25 TABLET, EXTENDED RELEASE ORAL at 08:39

## 2021-04-19 RX ADMIN — MILRINONE LACTATE 0.1 MCG/KG/MIN: 200 INJECTION, SOLUTION INTRAVENOUS at 14:03

## 2021-04-19 RX ADMIN — INSULIN LISPRO 4 UNITS: 100 INJECTION, SOLUTION INTRAVENOUS; SUBCUTANEOUS at 17:34

## 2021-04-19 RX ADMIN — Medication 10 ML: at 08:46

## 2021-04-19 RX ADMIN — INSULIN GLARGINE 25 UNITS: 100 INJECTION, SOLUTION SUBCUTANEOUS at 08:44

## 2021-04-19 ASSESSMENT — PAIN DESCRIPTION - LOCATION
LOCATION: BACK

## 2021-04-19 ASSESSMENT — PAIN SCALES - GENERAL
PAINLEVEL_OUTOF10: 6
PAINLEVEL_OUTOF10: 5

## 2021-04-19 ASSESSMENT — PAIN DESCRIPTION - PAIN TYPE
TYPE: CHRONIC PAIN

## 2021-04-19 ASSESSMENT — PAIN DESCRIPTION - PROGRESSION: CLINICAL_PROGRESSION: NOT CHANGED

## 2021-04-19 NOTE — PROGRESS NOTES
Meds:   insulin lispro  3 Units Subcutaneous TID     metoprolol succinate  25 mg Oral Daily    [Held by provider] furosemide  80 mg Intravenous Daily    aspirin  81 mg Oral Daily    gabapentin  300 mg Oral Nightly    insulin glargine  25 Units Subcutaneous Daily    montelukast  10 mg Oral Nightly    pantoprazole  40 mg Oral QAM AC    sodium chloride flush  5-40 mL Intravenous 2 times per day    insulin lispro  0-12 Units Subcutaneous TID WC    insulin lispro  0-6 Units Subcutaneous Nightly    warfarin  2.5 mg Oral Once per day on Mon    warfarin  5 mg Oral Once per day on Sun Tue Wed Thu Fri Sat    amiodarone  400 mg Oral BID     Continuous Infusions:   milrinone      sodium chloride      dextrose       PRN Meds:.albuterol, oxyCODONE, sodium chloride flush, sodium chloride, promethazine **OR** ondansetron, polyethylene glycol, acetaminophen **OR** acetaminophen, glucose, dextrose, glucagon (rDNA), dextrose        Vitals:  /76   Pulse 103   Temp 97.5 °F (36.4 °C) (Oral)   Resp 16   Ht 5' 8\" (1.727 m)   Wt 209 lb 4.8 oz (94.9 kg)   SpO2 97%   BMI 31.82 kg/m²   I/O last 3 completed shifts: In: 10 [I.V.:10]  Out: -   No intake/output data recorded. Physical Exam:  Gen: NAD  HEENT: Sclera anicteric, MMM  Neck: Supple. No JVD  CV: Irregular, Tachycardic, S1/S2  Pulm: CTAB/L  Abd: Soft, NT, ND  Ext: 2-3+ pitting edema in B/L LE  Neuro: Awake/Alert, Answers questions appropriately  Skin: Warm.  No significant visible rashes appreciated  Psych: Normal affect/mood        Labs:  CBC with Differential:    Lab Results   Component Value Date    WBC 4.0 04/16/2021    RBC 3.68 04/16/2021    HGB 10.0 04/16/2021    HCT 31.6 04/16/2021     04/16/2021    MCV 85.8 04/16/2021    MCH 27.1 04/16/2021    MCHC 31.6 04/16/2021    RDW 16.8 04/16/2021    SEGSPCT 64.1 09/02/2020    BANDSPCT 1 01/14/2019    LYMPHOPCT 15.1 04/16/2021    MONOPCT 14.7 04/16/2021    BASOPCT 0.8 04/16/2021    MONOSABS 0.6 04/16/2021    LYMPHSABS 0.6 04/16/2021    EOSABS 0.4 04/16/2021    BASOSABS 0.0 04/16/2021     BMP:    Lab Results   Component Value Date     04/19/2021    K 4.0 04/19/2021     04/19/2021    CO2 25 04/19/2021    BUN 36 04/19/2021    LABALBU 2.7 04/15/2021    CREATININE 1.9 04/19/2021    CALCIUM 8.4 04/19/2021    GFRAA 31 04/19/2021    LABGLOM 26 04/19/2021    LABGLOM 45 12/06/2018    GLUCOSE 142 04/19/2021     U/A:    Lab Results   Component Value Date    COLORU Yellow 04/15/2021    PROTEINU Negative 04/15/2021    PHUR 6.5 04/15/2021    WBCUA 3 04/15/2021    RBCUA 2 04/15/2021    YEAST neg 02/12/2021    BACTERIA trace 02/12/2021    BACTERIA 1+ 05/11/2020    CLARITYU Clear 04/15/2021    SPECGRAV 1.020 04/15/2021    LEUKOCYTESUR Negative 04/15/2021    UROBILINOGEN 0.2 04/15/2021    BILIRUBINUR Negative 04/15/2021    BLOODU TRACE-INTACT 04/15/2021    GLUCOSEU Negative 04/15/2021

## 2021-04-19 NOTE — PROGRESS NOTES
Pharmacy to Dose Warfarin    Pharmacy consulted to dose warfarin for DVT/Afib. INR Goal: 2-3    INR today: 2.75    Assessment/Plan:  - INR therapeutic today  - Will continue patient's home dose of 2.5 mg tonight  - Daily INR ordered    Pharmacy will continue to follow.     Corrinne Early, PharmD, [facility]  Clinical Pharmacist  [id number]

## 2021-04-19 NOTE — PROGRESS NOTES
[facility]    4/19/2021 9:30 AM        Name: Fatoumata Vargas .               Admitted: 4/15/2021  Primary Care Provider: Nuvia Pfeiffer MD (Tel: [phone])            Subjective:    Patient sitting in chair not having any chest pain sob, still in afib no nausea  Reviewed interval ancillary notes    Current Medications  insulin lispro (1 Unit Dial) 3 Units, TID WC  metoprolol succinate (TOPROL XL) extended release tablet 25 mg, Daily  [Held by provider] furosemide (LASIX) injection 80 mg, Daily  albuterol (PROVENTIL) nebulizer solution 2.5 mg, Q6H PRN  aspirin chewable tablet 81 mg, Daily  gabapentin (NEURONTIN) capsule 300 mg, Nightly  insulin glargine (LANTUS;BASAGLAR) injection pen 25 Units, Daily  montelukast (SINGULAIR) tablet 10 mg, Nightly  pantoprazole (PROTONIX) tablet 40 mg, QAM AC  oxyCODONE (ROXICODONE) immediate release tablet 5 mg, Q6H PRN  sodium chloride flush 0.9 % injection 5-40 mL, 2 times per day  sodium chloride flush 0.9 % injection 5-40 mL, PRN  0.9 % sodium chloride infusion, PRN  promethazine (PHENERGAN) tablet 12.5 mg, Q6H PRN    Or  ondansetron (ZOFRAN) injection 4 mg, Q6H PRN  polyethylene glycol (GLYCOLAX) packet 17 g, Daily PRN  acetaminophen (TYLENOL) tablet 650 mg, Q6H PRN    Or  acetaminophen (TYLENOL) suppository 650 mg, Q6H PRN  insulin lispro (1 Unit Dial) 0-12 Units, TID WC  insulin lispro (1 Unit Dial) 0-6 Units, Nightly  glucose (GLUTOSE) 40 % oral gel 15 g, PRN  dextrose 50 % IV solution, PRN  glucagon (rDNA) injection 1 mg, PRN  dextrose 5 % solution, PRN  warfarin (COUMADIN) tablet 2.5 mg, Once per day on Mon  warfarin (COUMADIN) tablet 5 mg, Once per day on Sun Tue Wed Thu Fri Sat  amiodarone (CORDARONE) tablet 400 mg, BID        Objective:  /76   Pulse 103   Temp 97.5 °F (36.4 °C) (Oral)   Resp 16   Ht 5' 8\" (1.727 m)   Wt 203 lb 4 oz (92.2 kg)   SpO2 97%

## 2021-04-19 NOTE — PROGRESS NOTES
40 mg Oral QAM AC    sodium chloride flush  5-40 mL Intravenous 2 times per day    insulin lispro  0-12 Units Subcutaneous TID WC    insulin lispro  0-6 Units Subcutaneous Nightly    warfarin  2.5 mg Oral Once per day on Mon    warfarin  5 mg Oral Once per day on Sun Tue Wed Thu Fri Sat    amiodarone  400 mg Oral BID      sodium chloride      dextrose         Lab Data:  CBC: No results for input(s): WBC, HGB, PLT in the last 72 hours. BMP:    Recent Labs     04/18/21  0432 04/18/21  1728 04/19/21  0436   * 131* 135*   K 4.0 4.1 4.0   CO2 24 22 25   BUN 32* 36* 36*   CREATININE 1.9* 2.2* 1.9*     INR:    Recent Labs     04/17/21  0420 04/18/21  0432 04/19/21  0436   INR 2.21* 2.24* 2.75*     BNP:    Recent Labs     04/18/21 1728   PROBNP 15,113*         Diagnostics:  11/30/2020 Dobutamine Echo   Dobutamine echocardiogram for aortic stenosis.   Very technically limited exam due to atrial fibrillation.   Baseline echocardiogram shows severe left ventricular dysfunction with   anterior, lateral and apical hypokinesis with an ejection fraction of 20-25%.   There is no improvement in wall motion with low or high dose dobutamine   suggestive of nonviable myocardium.      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 After dobutamine stress the mean AV   gradient rises to 20mmHg with 20mcg of dobutamine consistent with mild to   moderate aortic stenosis.  Prosthetic mitral valve with a mean gradient of 7mmHg        JUDE 10/21/2020  Mildly dilated left ventricular size and normal wall thickness. Global left ventricular function is severely decreased with ejection fraction estimated at 25%. Patient is in atrial fibrillation during procedure.      The bioprosthetic mitral valve is well seated with a mean gradient of 5mmHg and maximum pressure gradient of 15 mmHg with heart rate of 89 bpm. Pressure half time of 82 ms.  There is trivial mitral regurgitation.      Left atrial enlargement. Spontaneous echo contrast seen in the left atrium. A large stump of the left atrial appendage with no thrombus noted. Patient has history of surgical ligation of the left atrial appendage. There are spontaneous echo contrast in the atrial appendage.      The aortic valve is thickened/calcified with decreased leaflet mobility consistent with aortic stenosis. There is trivial aortic insufficiency.      There is moderate tricuspid regurgitation.     ECHO 9/15/20  Left ventricular cavity size is dilated. There is normal wall thickness with a moderately severe reduction in   systolic function.   LV ejection fraction is visually estimated to be 25-30%.   Indeterminate diastolic function.   The right ventricle is not well visualized but appears to be normal in size with moderately reduced function.   The left atrium is moderately dilated.   A bioprosthetic mitral valve with a mean gradient of 7 mmHg. This may be a normal gradient for this valve but could suggest mild stenosis.   Trivial mitral regurgitation.   The aortic valve is thickened/calcified with a mean gradient of 16mmHg consistent with at least mild aortic stenosis. This is likely underestimated due to low cardiac output secondary to LV dysfunction.   No significant aortic valve regurgitation.   Moderate tricuspid regurgitation with RVSP of 48 mmHg. Assessment:    1. Atrial fibrillation with RVR, on coumadin  2. Chest pain  3. Dyspnea on exertion  4. Chronic systolic heart failure, on bb, no ace/arb/aldosterone antagonist due to TORIBIO  5. TORIBIO on CKD      Plan:    1. Nephrology following, diuretics on hold  2. Daily weights  3. Continue metoprolol 25 mg daily  4. CHF nurse following for diet education, fluid restriction and daily weights  5.   Will start milrinone 0.1 mcg/kg/min  6.  EP planning CV tomorrow    Discussed with Dr Vern Ramsey and Dr Homero Bowers    Discussed with patient and  who are agreeable with plan of care. Thank you for allowing me to participate in the care of your patient.     Zulema Noguera, CNS

## 2021-04-19 NOTE — PROGRESS NOTES
Maryan 70 1945    History:  Past Medical History:   Diagnosis Date    Asthma     Atrial fibrillation (HCC)     Eosinophilia     Hemoptysis     HIGH CHOLESTEROL     Hx of blood clots     Hypertension     Irregular heart beat     Other specified gastritis without mention of hemorrhage     Palpitations     Skin cancer     basal and squamous    Type II or unspecified type diabetes mellitus without mention of complication, not stated as uncontrolled        ECHO:     Conclusions      Summary   Dobutamine echocardiogram for aortic stenosis. Very technically limited exam due to atrial fibrillation. Baseline echocardiogram shows severe left ventricular dysfunction with   anterior, lateral and apical hypokinesis with an ejection fraction of 20-25   %. There is no improvement in wall motion with low or high dose dobutamine   suggestive of nonviable myocardium. Mild prosthetic aortic valve stenosis with a mean gradient of 16mmHg and   peak velocity of 2.47m/s at rest. After dobutamine stress the mean AV   gradient rises to 20mmHg with 20mcg of dobutamine consistent with mild to   moderate aortic stenosis. Prosthetic mitral valve with a mean gradient of 7mmHg      Signature      ------------------------------------------------------------------   Electronically signed by Ky Paulino MD   (Interpreting physician) on 11/30/2020 at 09:58 AM   ------------------------------------------------------------------      ACE/ARB: No ace/arb due to kidney   BB: Toprol XL 25 mg daily   Aldosterone Antagonist: No aldosterone antagonist     History of sleep apnea: No  Equipment used at home: None   Firth Screen ordered: No. Screened on 2/26/21 for a score of 5    DM History: No  DM medications in hopsital: Insulin lispro sliding scale TID with meals and at night.  Insulin Glargine 25 units daily  DM medications at home: Insuline Glargine Last Hospital Admission: 21  Code Status: Full   Discharge plans:Patient to return home. Lives independently with     Family Present:     Ms. Lamont Simpson is admitted to the hospital for atrial fibrillation. She was complaining of chest pain and shortness of breath. She is being seen for chronic heart failure and follows with Dr. Sonam Reyes of the heart failure team. She weighs daily, follows a low sodium and fluid restriction diet. Her  logs daily weights, blood pressure and blood sugars daily. Patient provided with both written and verbal education on CHF signs/ symptoms, causes, discharge medications, daily weights, low sodium diet, activity, and follow-up. Pt to call if gains 3 pounds in one day or 5 pounds in one week. Mutually agreed upon goals were discussed such as calling the MD as soon as they recognize symptoms and weight gain, maintaining his proper diet, taking medications as prescribed, joining rehab when able. Patient provided with CHF Zone Management tool and CHF symptoms magnet. Discussed importance of lifestyle changes: Call early for symptoms     PATIENT/CAREGIVER TEACHING:    Level of patient/caregiver understanding able to:   [x ] Verbalize understanding [ ] Demonstrate understanding [ ] Teach back   [ ] Needs reinforcement [ ] Other:       Time spent teachin minutes     1. WEIGHT: Admit Weight: 203 lb 4 oz (92.2 kg)      Today  Weight: 209 lb 4.8 oz (94.9 kg)   2. I/O No intake or output data in the 24 hours ending 21 1126    Recommendations:   1. Patient will need a follow up appointment within 7 days of discharge    2. Educate further on fluid restriction 48 oz- 64 oz during inpatient stay so he can understand how to measure intake at home. 3. Continue to educate on S/S.   4. Emphasize daily weights, diet, and knowing when and who to call  5. Provided patient with CHF Resource Line for questions and concerns.        Crystal Velez 2021 11:26 AM

## 2021-04-20 LAB
ANION GAP SERPL CALCULATED.3IONS-SCNC: 9 MMOL/L (ref 3–16)
BUN BLDV-MCNC: 31 MG/DL (ref 7–20)
CALCIUM SERPL-MCNC: 8.6 MG/DL (ref 8.3–10.6)
CHLORIDE BLD-SCNC: 100 MMOL/L (ref 99–110)
CO2: 26 MMOL/L (ref 21–32)
CREAT SERPL-MCNC: 1.7 MG/DL (ref 0.6–1.2)
GFR AFRICAN AMERICAN: 36
GFR NON-AFRICAN AMERICAN: 29
GLUCOSE BLD-MCNC: 123 MG/DL (ref 70–99)
GLUCOSE BLD-MCNC: 129 MG/DL (ref 70–99)
GLUCOSE BLD-MCNC: 137 MG/DL (ref 70–99)
GLUCOSE BLD-MCNC: 138 MG/DL (ref 70–99)
GLUCOSE BLD-MCNC: 149 MG/DL (ref 70–99)
GLUCOSE BLD-MCNC: 168 MG/DL (ref 70–99)
INR BLD: 2.52 (ref 0.86–1.14)
LV EF: 13 %
LVEF MODALITY: NORMAL
PERFORMED ON: ABNORMAL
POTASSIUM REFLEX MAGNESIUM: 3.7 MMOL/L (ref 3.5–5.1)
PROTHROMBIN TIME: 29.5 SEC (ref 10–13.2)
SODIUM BLD-SCNC: 135 MMOL/L (ref 136–145)

## 2021-04-20 PROCEDURE — 6360000002 HC RX W HCPCS: Performed by: CLINICAL NURSE SPECIALIST

## 2021-04-20 PROCEDURE — 99233 SBSQ HOSP IP/OBS HIGH 50: CPT | Performed by: INTERNAL MEDICINE

## 2021-04-20 PROCEDURE — 2060000000 HC ICU INTERMEDIATE R&B

## 2021-04-20 PROCEDURE — 36415 COLL VENOUS BLD VENIPUNCTURE: CPT

## 2021-04-20 PROCEDURE — 2580000003 HC RX 258: Performed by: INTERNAL MEDICINE

## 2021-04-20 PROCEDURE — 5A2204Z RESTORATION OF CARDIAC RHYTHM, SINGLE: ICD-10-PCS | Performed by: INTERNAL MEDICINE

## 2021-04-20 PROCEDURE — 6370000000 HC RX 637 (ALT 250 FOR IP): Performed by: NURSE PRACTITIONER

## 2021-04-20 PROCEDURE — 99232 SBSQ HOSP IP/OBS MODERATE 35: CPT | Performed by: CLINICAL NURSE SPECIALIST

## 2021-04-20 PROCEDURE — 93306 TTE W/DOPPLER COMPLETE: CPT

## 2021-04-20 PROCEDURE — 85610 PROTHROMBIN TIME: CPT

## 2021-04-20 PROCEDURE — 94761 N-INVAS EAR/PLS OXIMETRY MLT: CPT

## 2021-04-20 PROCEDURE — 6370000000 HC RX 637 (ALT 250 FOR IP): Performed by: INTERNAL MEDICINE

## 2021-04-20 PROCEDURE — 6360000002 HC RX W HCPCS: Performed by: INTERNAL MEDICINE

## 2021-04-20 PROCEDURE — 92960 CARDIOVERSION ELECTRIC EXT: CPT

## 2021-04-20 PROCEDURE — 7100000010 HC PHASE II RECOVERY - FIRST 15 MIN

## 2021-04-20 PROCEDURE — 92960 CARDIOVERSION ELECTRIC EXT: CPT | Performed by: INTERNAL MEDICINE

## 2021-04-20 PROCEDURE — 80048 BASIC METABOLIC PNL TOTAL CA: CPT

## 2021-04-20 RX ORDER — FUROSEMIDE 10 MG/ML
80 INJECTION INTRAMUSCULAR; INTRAVENOUS ONCE
Status: COMPLETED | OUTPATIENT
Start: 2021-04-20 | End: 2021-04-20

## 2021-04-20 RX ADMIN — PANTOPRAZOLE SODIUM 40 MG: 40 TABLET, DELAYED RELEASE ORAL at 06:35

## 2021-04-20 RX ADMIN — ASPIRIN 81 MG: 81 TABLET, CHEWABLE ORAL at 09:40

## 2021-04-20 RX ADMIN — INSULIN LISPRO 3 UNITS: 100 INJECTION, SOLUTION INTRAVENOUS; SUBCUTANEOUS at 17:41

## 2021-04-20 RX ADMIN — METOPROLOL SUCCINATE 25 MG: 25 TABLET, EXTENDED RELEASE ORAL at 09:40

## 2021-04-20 RX ADMIN — Medication 10 ML: at 21:29

## 2021-04-20 RX ADMIN — PROMETHAZINE HYDROCHLORIDE 12.5 MG: 25 TABLET ORAL at 21:29

## 2021-04-20 RX ADMIN — FUROSEMIDE 80 MG: 10 INJECTION, SOLUTION INTRAMUSCULAR; INTRAVENOUS at 14:21

## 2021-04-20 RX ADMIN — WARFARIN SODIUM 5 MG: 5 TABLET ORAL at 17:40

## 2021-04-20 RX ADMIN — MONTELUKAST SODIUM 10 MG: 10 TABLET, FILM COATED ORAL at 21:29

## 2021-04-20 RX ADMIN — GABAPENTIN 300 MG: 300 CAPSULE ORAL at 21:29

## 2021-04-20 RX ADMIN — MILRINONE LACTATE 0.1 MCG/KG/MIN: 200 INJECTION, SOLUTION INTRAVENOUS at 17:41

## 2021-04-20 RX ADMIN — Medication 10 ML: at 14:21

## 2021-04-20 RX ADMIN — POLYETHYLENE GLYCOL 3350 17 G: 17 POWDER, FOR SOLUTION ORAL at 14:36

## 2021-04-20 RX ADMIN — AMIODARONE HYDROCHLORIDE 400 MG: 200 TABLET ORAL at 09:41

## 2021-04-20 RX ADMIN — FUROSEMIDE 5 MG/HR: 10 INJECTION, SOLUTION INTRAMUSCULAR; INTRAVENOUS at 14:31

## 2021-04-20 RX ADMIN — INSULIN GLARGINE 25 UNITS: 100 INJECTION, SOLUTION SUBCUTANEOUS at 09:42

## 2021-04-20 RX ADMIN — OXYCODONE 5 MG: 5 TABLET ORAL at 06:35

## 2021-04-20 RX ADMIN — MILRINONE LACTATE 0.1 MCG/KG/MIN: 200 INJECTION, SOLUTION INTRAVENOUS at 09:47

## 2021-04-20 RX ADMIN — AMIODARONE HYDROCHLORIDE 400 MG: 200 TABLET ORAL at 21:29

## 2021-04-20 RX ADMIN — INSULIN LISPRO 1 UNITS: 100 INJECTION, SOLUTION INTRAVENOUS; SUBCUTANEOUS at 21:29

## 2021-04-20 ASSESSMENT — PAIN DESCRIPTION - ORIENTATION
ORIENTATION: LOWER
ORIENTATION: LOWER

## 2021-04-20 ASSESSMENT — PAIN DESCRIPTION - FREQUENCY
FREQUENCY: INTERMITTENT
FREQUENCY: INTERMITTENT

## 2021-04-20 ASSESSMENT — PAIN DESCRIPTION - DESCRIPTORS
DESCRIPTORS: SHARP
DESCRIPTORS: SHARP

## 2021-04-20 ASSESSMENT — PAIN DESCRIPTION - PAIN TYPE: TYPE: CHRONIC PAIN

## 2021-04-20 ASSESSMENT — PAIN SCALES - GENERAL: PAINLEVEL_OUTOF10: 5

## 2021-04-20 ASSESSMENT — PAIN DESCRIPTION - LOCATION: LOCATION: BACK

## 2021-04-20 NOTE — PROGRESS NOTES
[facility]   Electrophysiology Progress Note     Admit Date: 4/15/2021     Reason for follow up: atrial fibrillation    HPI and Interval History: 77 y.o. female presented with palpitation. History of persistent atrial fibrillation, failed ablation and cardioversion in the past, CAD s/p CABGx3 Maze with WAYNE clip, DVT/PE on coumadin, bioprosthetic mitral valve replacement, moderate AS and LV dysfunction with HFrEF. Patient is on coumadin for history of DVT/PE. On admission was found to have atrial fibrillation with RVR. Started on amiodarone loading dose. Has developed TORIBIO during hospitalization from diuretic use/hypotension. Recommended cardioversion and also dobutamine for low EF. She has remained in atrial fibrillation. She has been diuresed and loaded with amiodarone. Patient seen and examined. Clinical notes reviewed. Telemetry reviewed. No new complaint today. No major events overnight. Denies having chest pain, shortness of breath, dyspnea on exertion, Orthopnea, PND at the time of this visit. Assessment and plan:     - Persistent atrial fibrillation:    Complex medical condition with persistent afib, TORIBIO, CKD, HFrEF and history of CABG   Has had WAYNE clip during surgery. Presented to the hospital with atrial fibrillation and rapid ventricular response. Cannot receive cardizem due to negative inotropic effect. Failed prior cardioversion and also had ablation. History of atrial fibrillation after her mitral valve CABG. She used to take amiodarone which was stopped in 2018    We have discussed amiodarone therapy, including risks, benefits and alternatives. Side effects have been discussed.                  Discussed cardioversion. Risks, benefits and alternative of procedure were explained. All questions answered.  Patient understood and would like to proceed.      - Acute on chronic HFrEF    Last EF: 11/20: 20-25%    ACE-I/ARB contraindicated due to CKD/TORIBIO   Diuretics on hold for TORIBIO   On Toprol XL     On primacor drip for severe LV dysfunction. Echo after cardioversion for re-assessing of EF. I have discussed AICD implantation with her before. He last EF was 20-25% in 11/2020. If EF remains severely reduced she is a candidate for AICD implantation.     - TORIBIO on CKD   Secondary to diuretics. Cr is improving. Off diuretics. BP stable. - CAD:    S/p CABG   ASA, BB and statin. Discussed with nursing staff. Active Hospital Problems    Diagnosis Date Noted    PAF (paroxysmal atrial fibrillation) ([facility] [address] [I48.0]      Priority: Low    Atrial fibrillation with rapid ventricular response (HCC) [I48.91]     Chest pain [R07.9]     Dyspnea on exertion [R06.00]     Acute kidney injury superimposed on CKD (HCC) [N17.9, N18.9]     Chronic systolic CHF (congestive heart failure), NYHA class 3 ([facility] [address] [I50.22] 01/15/2019       Diagnostic studies:     ECHO: 10/20   Mildly dilated left ventricular size and normal wall thickness. Global left   ventricular function is severely decreased with ejection fraction estimated   at 25%. Patient is in atrial fibrillation during procedure.      The bioprosthetic mitral valve is well seated with a mean gradient of 5 mmHg   and maximum pressure gradient of 15 mmHg with heart rate of 89 bpm. Pressure   half time of 82 ms. There is trivial mitral regurgitation.      Left atrial enlargement. Spontaneous echo contrast seen in the left atrium.   A large stump of the left atrial appendage with no thrombus noted. Patient   has history of surgical ligation of the left atrial appendage. There are   spontaneous echo contrast in the atrial appendage.      The aortic valve is thickened/calcified with decreased leaflet mobility consistent with aortic stenosis. There is trivial aortic insufficiency.  There is moderate tricuspid regurgitation     Stress Test: 8/18   Small sized lateral completely reversible defect of mild intensity   consistent with ischemia in the territory of the LCx and/or LAD .   Normal LV function.   Overall findings represent a intermediate risk scan. I independently reviewed the cardiac diagnostic studies, ECG and relevant imaging studies. Physical Examination:  Vitals:    21 0635   BP: 119/81   Pulse: 97   Resp: 20   Temp:    SpO2:       In: 765.1 [P.O.:745; I.V.:20.1]  Out: 950    Wt Readings from Last 3 Encounters:   21 212 lb 6.4 oz (96.3 kg)   21 205 lb (93 kg)   21 206 lb 9.6 oz (93.7 kg)     Temp  Av.9 °F (36.6 °C)  Min: 97.5 °F (36.4 °C)  Max: 98.4 °F (36.9 °C)  Pulse  Av.5  Min: 96  Max: 103  BP  Min: 106/72  Max: 123/78  SpO2  Av.4 %  Min: 95 %  Max: 97 %    Intake/Output Summary (Last 24 hours) at 2021 7373  Last data filed at 2021 0645  Gross per 24 hour   Intake 1245.08 ml   Output 950 ml   Net 295.08 ml       I independently reviewed all cardiac tracing from cardiac telemetry. · Constitutional: Oriented. No distress. · Head: Normocephalic and atraumatic. · Mouth/Throat: Oropharynx is clear and moist.   · Eyes: Conjunctivae normal. EOM are normal.   · Neck: Neck supple. No JVD present. · Cardiovascular: Normal rate, Irregular rhythm, L0&X2.  + systolic murmur   · Pulmonary/Chest: Bilateral respiratory sounds. No rhonchi. · Abdominal: Soft. No tenderness. · Musculoskeletal: No tenderness. + edema    · Lymphadenopathy: Has no cervical adenopathy. · Neurological: Alert and oriented. Follows command, No Gross deficit   · Skin: Skin is warm, No rash noted.    · Psychiatric: Has a normal behavior     Scheduled Meds:   insulin lispro  3 Units Subcutaneous TID WC    metoprolol succinate  25 mg Oral Daily    [Held by provider] furosemide  80 mg Intravenous Daily    aspirin  81 mg Oral Daily    gabapentin  300 mg Oral Nightly    insulin glargine  25 Units Subcutaneous Daily    montelukast  10 mg Oral Provider, MD   albuterol sulfate  (90 Base) MCG/ACT inhaler USE 2 INHALATIONS EVERY 6 HOURS AS NEEDED FOR WHEEZING 11/17/20  Yes Gerson Moreno MD   potassium chloride (KLOR-CON M) 10 MEQ extended release tablet TAKE 1 TABLET DAILY 9/23/20  Yes Gerson Moreno MD   albuterol (PROVENTIL) (2.5 MG/3ML) 0.083% nebulizer solution Take 3 mLs by nebulization every 6 hours as needed for Wheezing 6/2/20  Yes Gerson Moreno MD   polyethylene glycol (GLYCOLAX) 17 GM/SCOOP powder Take 17 g by mouth every other day    Yes Historical Provider, MD   omeprazole (PRILOSEC) 20 MG delayed release capsule Take 20 mg by mouth daily   Yes Historical Provider, MD   ondansetron (ZOFRAN) 4 MG tablet Take 1 tablet by mouth 3 times daily as needed for Nausea or Vomiting 3/26/20  Yes Gerson Moreno MD   warfarin (COUMADIN) 5 MG tablet TAKE 1 TABLET DAILY  Patient taking differently: Take 5 mg by mouth daily Managed by PCP 2/26/20  Yes Jen Lockett MD   montelukast (SINGULAIR) 10 MG tablet TAKE 1 TABLET NIGHTLY 2/26/20  Yes Jen Lockett MD   aspirin 81 MG chewable tablet Take 1 tablet by mouth daily 6/7/19  Yes Gerson Moreno MD   vitamin B-12 500 MCG tablet Take 1 tablet by mouth daily 10/12/18  Yes Katia Smith MD   fluconazole (DIFLUCAN) 100 MG tablet Take 1 tablet by mouth daily for 7 days Take 5 mg coumadin when taking diflucan 4/13/21 4/20/21  Gerson Moreno MD   oxyCODONE (ROXICODONE) 5 MG immediate release tablet Take 1 tablet by mouth every 6 hours as needed for Pain for up to 30 days.  3/23/21 4/22/21  Gerson Moreno MD   blood glucose test strips (FREESTYLE LITE) strip USE TO TEST FOUR TIMES A DAY 1/5/21   Gerson Moreno MD   FreeStyle Lancets MISC 1 each by Does not apply route 4 times daily 4/29/20   Gerson Moreno MD   Blood Glucose Monitoring Suppl (EMBRACE PRO GLUCOSE METER) GAETANO 1 Device by Does not apply route 4 times daily 2/4/20   MD Shameka Cortes [facility] 100 UNIT/ML SOPN TAKE AS INSTRUCTED BY YOUR PRESCRIBER  Patient taking differently: Only if high blood sugar-has not been using 12/30/19   Mathew Lange MD   nystatin (MYCOSTATIN) 874525 UNIT/ML suspension TAKE ONE TEASPOONFUL BY MOUTH FOUR TIMES A DAY FOR 5 DAYS 11/20/19   Mathew Lange MD   BD PEN NEEDLE JANNET U/F 32G X 4 MM MISC USE 1 PEN NEEDLE FOUR TIMES A DAY 3/22/19   Mathew Lange MD       Review of System:  [x] Full ROS obtained and negative except as mentioned in HPI    Relevant and available labs, and cardiovascular diagnostics reviewed. Reviewed. Recent Labs     04/18/21  1728 04/19/21  0436 04/20/21  0428   * 135* 135*   K 4.1 4.0 3.7   CL 96* 101 100   CO2 22 25 26   PHOS  --  3.4  --    BUN 36* 36* 31*   CREATININE 2.2* 1.9* 1.7*     No results for input(s): WBC, HGB, HCT, MCV, PLT in the last 72 hours. Estimated Creatinine Clearance: 35 mL/min (A) (based on SCr of 1.7 mg/dL (H)). Lab Results   Component Value Date     09/02/2020     11/27/2018       I independently reviewed all cardiac tracing from cardiac telemetry. I independently reviewed relevant and available cardiac diagnostic tests ECG, CXR, Echo, Stress test, Device interrogation, Holter, CT scan. Outside medical records via Care everywhere reviewed and summarized in H&P above. Complex medical condition with multiple medical problems affecting prognosis and outcome of EP interventions  Severe exacerbation of underlying medical condition requiring hospitalization and at risk of decompensation. I have spent 35 minutes of face to face time with the patient with more than 50% spent counseling and coordinating care for this patient    Thank you for allowing me to participate in the care of Natalee Izaguirre     All questions and concerns were addressed to the patient/family. Alternatives to my treatment were discussed.  I have discussed the above stated plan and the patient verbalized understanding and agreed with the plan. NOTE: This report was transcribed using voice recognition software. Every effort was made to ensure accuracy, however, inadvertent computerized transcription errors may be present.      Cuco Owusu MD, MPH  [place] [address]   Office: [phone]  Fax: [phone]

## 2021-04-20 NOTE — PROGRESS NOTES
Cardioversion x 2 this morning. Each time she was converted to sinus but Afib recurred. Continue with Amiodarone. Will try cardioversion again in a few weeks again.      Rima Curtis MD, MPH  [place] [address]   Office: [phone]  Fax: [phone]

## 2021-04-20 NOTE — PROGRESS NOTES
EOSABS 0.4 04/16/2021    BASOSABS 0.0 04/16/2021     BMP:    Lab Results   Component Value Date     04/20/2021    K 3.7 04/20/2021     04/20/2021    CO2 26 04/20/2021    BUN 31 04/20/2021    LABALBU 3.1 04/19/2021    CREATININE 1.7 04/20/2021    CALCIUM 8.6 04/20/2021    GFRAA 36 04/20/2021    LABGLOM 29 04/20/2021    LABGLOM 45 12/06/2018    GLUCOSE 129 04/20/2021     U/A:    Lab Results   Component Value Date    COLORU Yellow 04/15/2021    PROTEINU Negative 04/15/2021    PHUR 6.5 04/15/2021    WBCUA 3 04/15/2021    RBCUA 2 04/15/2021    YEAST neg 02/12/2021    BACTERIA trace 02/12/2021    BACTERIA 1+ 05/11/2020    CLARITYU Clear 04/15/2021    SPECGRAV 1.020 04/15/2021    LEUKOCYTESUR Negative 04/15/2021    UROBILINOGEN 0.2 04/15/2021    BILIRUBINUR Negative 04/15/2021    BLOODU TRACE-INTACT 04/15/2021    GLUCOSEU Negative 04/15/2021

## 2021-04-20 NOTE — PROGRESS NOTES
[facility]    4/20/2021 10:20 AM        Name: Dillon Ibrahim .               Admitted: 4/15/2021  Primary Care Provider: German Hamm MD (Tel: [phone])            Subjective:    Patient lying in bed not having any chest pain sob, still in afib no nausea  Reviewed interval ancillary notes    Current Medications  milrinone (PRIMACOR) 20 mg in dextrose 5 % 100 mL infusion, Continuous  insulin lispro (1 Unit Dial) 3 Units, TID WC  metoprolol succinate (TOPROL XL) extended release tablet 25 mg, Daily  [Held by provider] furosemide (LASIX) injection 80 mg, Daily  albuterol (PROVENTIL) nebulizer solution 2.5 mg, Q6H PRN  aspirin chewable tablet 81 mg, Daily  gabapentin (NEURONTIN) capsule 300 mg, Nightly  insulin glargine (LANTUS;BASAGLAR) injection pen 25 Units, Daily  montelukast (SINGULAIR) tablet 10 mg, Nightly  pantoprazole (PROTONIX) tablet 40 mg, QAM AC  oxyCODONE (ROXICODONE) immediate release tablet 5 mg, Q6H PRN  sodium chloride flush 0.9 % injection 5-40 mL, 2 times per day  sodium chloride flush 0.9 % injection 5-40 mL, PRN  0.9 % sodium chloride infusion, PRN  promethazine (PHENERGAN) tablet 12.5 mg, Q6H PRN    Or  ondansetron (ZOFRAN) injection 4 mg, Q6H PRN  polyethylene glycol (GLYCOLAX) packet 17 g, Daily PRN  acetaminophen (TYLENOL) tablet 650 mg, Q6H PRN    Or  acetaminophen (TYLENOL) suppository 650 mg, Q6H PRN  insulin lispro (1 Unit Dial) 0-12 Units, TID WC  insulin lispro (1 Unit Dial) 0-6 Units, Nightly  glucose (GLUTOSE) 40 % oral gel 15 g, PRN  dextrose 50 % IV solution, PRN  glucagon (rDNA) injection 1 mg, PRN  dextrose 5 % solution, PRN  warfarin (COUMADIN) tablet 2.5 mg, Once per day on Mon  warfarin (COUMADIN) tablet 5 mg, Once per day on Sun Tue Wed Thu Fri Sat  amiodarone (CORDARONE) tablet 400 mg, BID        Objective:  /82   Pulse 103   Temp 98.2 °F (36.8 °C) (Oral)   Resp 18   Ht 5' 8\" (1.727 m)   Wt 212 lb 6.4 oz (96.3 kg)   SpO2 94%   BMI 32.30 kg/m²     Intake/Output Summary (Last 24 hours) at 4/20/2021 1020  Last data filed at 4/20/2021 0947  Gross per 24 hour   Intake 920.27 ml   Output 950 ml   Net -29.73 ml      Wt Readings from Last 3 Encounters:   04/20/21 212 lb 6.4 oz (96.3 kg)   04/13/21 205 lb (93 kg)   04/02/21 206 lb 9.6 oz (93.7 kg)       General appearance:  Appears comfortable. AAOx3  HEENT: atraumatic, Pupils equal, muscous membranes moist, no masses appreciated  Cardiovascular irregulary irregular no murmurs appreciated  Respiratory: CTAB no wheezing  Gastrointestinal: Abdomen soft, non-tender, BS+  EXT: 1+ edema  Neurology: no gross focal deficts  Psychiatry: Appropriate affect. Not agitated  Skin: Warm, dry, no rashes appreciated    Labs and Tests:  CBC:   No results for input(s): WBC, HGB, PLT in the last 72 hours. BMP:    Recent Labs     04/18/21  1728 04/19/21  0436 04/20/21  0428   * 135* 135*   K 4.1 4.0 3.7   CL 96* 101 100   CO2 22 25 26   BUN 36* 36* 31*   CREATININE 2.2* 1.9* 1.7*   GLUCOSE 170* 142* 129*     Hepatic:   No results for input(s): AST, ALT, ALB, BILITOT, ALKPHOS in the last 72 hours. XR CHEST PORTABLE   Final Result   No acute cardiopulmonary disease. Stable cardiomegaly with no evidence of   overt failure. Recent imaging reviewed    Problem List  Active Problems:    PAF (paroxysmal atrial fibrillation) (HCC)    Chronic systolic CHF (congestive heart failure), NYHA class 3 (HCC)    Atrial fibrillation with rapid ventricular response (HCC)    Chest pain    Dyspnea on exertion    Acute kidney injury superimposed on CKD ([address]  Resolved Problems:    * No resolved hospital problems.  *          Assessment/Plan:   PAf with rvr  -cardioversion today  - coumadin     Chest pain likely secondary   - troponin stable, cards on board     Acute on chronic systolic heart failure: ef 20-25  - home meds  - restart lasix after cardioversion will liiely need inotrop    TORIBIO: lkely secondary to lasix and hypotensin with afib  - nephro on board holding ACEI, spironolactone, cr improving       Dm2 with hyperglycemia:improved monitor        DVT prophylaxis coumadin  Code status full code        Cristhian Israel MD   4/20/2021 10:20 AM

## 2021-04-20 NOTE — PLAN OF CARE
Problem: Pain:  Description: Pain management should include both nonpharmacologic and pharmacologic interventions. Goal: Control of acute pain  Description: Control of acute pain  Outcome: Ongoing  Note: Pt has intermittent back pain. Continue Oxycodone PRN. Problem: Bleeding:  Goal: Will show no signs and symptoms of excessive bleeding  Description: Will show no signs and symptoms of excessive bleeding  Outcome: Completed     Problem: Cardiovascular  Goal: No DVT, peripheral vascular complications  Outcome: Ongoing  Goal: Hemodynamic stability  Outcome: Ongoing  Note: VSS. 3 plus edema to BLE. Abdomen distended. Continue Milrinone gtt. Wait for nephrology orders today. Goal: Anticoagulate/Hct stable  Outcome: Ongoing  Goal: Agreement to quit smoking  Outcome: Ongoing  Goal: Weight maintained or lost  Outcome: Ongoing  Goal: Understanding of dietary restrictions  Outcome: Ongoing     Problem: Respiratory  Goal: No pulmonary complications  Outcome: Ongoing  Note: Fine crackles in the base of the lungs. SOB on exertion, on RA.   Goal: O2 Sat > 90%  Outcome: Ongoing  Goal: Supplemental O2 requirements decreased  Outcome: Ongoing  Goal: Agreement to quit smoking  Outcome: Ongoing  Goal: Pneumonia vaccine at discharge as needed  Outcome: Ongoing     Problem: Falls - Risk of:  Goal: Will remain free from falls  Description: Will remain free from falls  Outcome: Completed  Goal: Absence of physical injury  Description: Absence of physical injury  Outcome: Completed

## 2021-04-20 NOTE — PROGRESS NOTES
Awake, sitting up in chair, pleasant, visiting with daughter. C/O chronic back pain, declined pain med, says pain is tolerable. Still in atrial fib, rate controlled. SOB on exertion. Milrinone gtt infusing, VSS. Assessment completed, see flow charts. No respiratory distress noted. evelyne

## 2021-04-20 NOTE — PROGRESS NOTES
times per day    insulin lispro  0-12 Units Subcutaneous TID     insulin lispro  0-6 Units Subcutaneous Nightly    warfarin  2.5 mg Oral Once per day on Mon    warfarin  5 mg Oral Once per day on Sun Tue Wed Thu Fri Sat    amiodarone  400 mg Oral BID      milrinone 0.1 mcg/kg/min (04/19/21 1403)    sodium chloride      dextrose         Lab Data:  CBC: No results for input(s): WBC, HGB, PLT in the last 72 hours. BMP:    Recent Labs     04/18/21  1728 04/19/21  0436 04/20/21  0428   * 135* 135*   K 4.1 4.0 3.7   CO2 22 25 26   BUN 36* 36* 31*   CREATININE 2.2* 1.9* 1.7*     INR:    Recent Labs     04/18/21  0432 04/19/21  0436 04/20/21  0427   INR 2.24* 2.75* 2.52*     BNP:    Recent Labs     04/18/21  1728   PROBNP 15,113*         Diagnostics:  Echo pending 4/20/21 11/30/2020 Dobutamine Echo   Dobutamine echocardiogram for aortic stenosis.   Very technically limited exam due to atrial fibrillation.   Baseline echocardiogram shows severe left ventricular dysfunction with   anterior, lateral and apical hypokinesis with an ejection fraction of 20-25%.   There is no improvement in wall motion with low or high dose dobutamine   suggestive of nonviable myocardium.      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 After dobutamine stress the mean AV   gradient rises to 20mmHg with 20mcg of dobutamine consistent with mild to   moderate aortic stenosis.  Prosthetic mitral valve with a mean gradient of 7mmHg        JUDE 10/21/2020  Mildly dilated left ventricular size and normal wall thickness. Global left ventricular function is severely decreased with ejection fraction estimated at 25%. Patient is in atrial fibrillation during procedure.      The bioprosthetic mitral valve is well seated with a mean gradient of 5mmHg and maximum pressure gradient of 15 mmHg with heart rate of 89 bpm. Pressure half time of 82 ms.  There is trivial mitral regurgitation.      Left atrial enlargement. Spontaneous echo contrast seen in the left atrium. A large stump of the left atrial appendage with no thrombus noted. Patient has history of surgical ligation of the left atrial appendage. There are spontaneous echo contrast in the atrial appendage.      The aortic valve is thickened/calcified with decreased leaflet mobility consistent with aortic stenosis. There is trivial aortic insufficiency.      There is moderate tricuspid regurgitation.     ECHO 9/15/20  Left ventricular cavity size is dilated. There is normal wall thickness with a moderately severe reduction in   systolic function.   LV ejection fraction is visually estimated to be 25-30%.   Indeterminate diastolic function.   The right ventricle is not well visualized but appears to be normal in size with moderately reduced function.   The left atrium is moderately dilated.   A bioprosthetic mitral valve with a mean gradient of 7 mmHg. This may be a normal gradient for this valve but could suggest mild stenosis.   Trivial mitral regurgitation.   The aortic valve is thickened/calcified with a mean gradient of 16mmHg consistent with at least mild aortic stenosis. This is likely underestimated due to low cardiac output secondary to LV dysfunction.   No significant aortic valve regurgitation.   Moderate tricuspid regurgitation with RVSP of 48 mmHg. Assessment:    1. Atrial fibrillation with RVR, on coumadin  2. Chest pain  3. Dyspnea on exertion  4. Chronic systolic heart failure, on bb, no ace/arb/aldosterone antagonist due to TORIBIO  5. TORIBIO on CKD      Plan:    1. Nephrology following  2. Daily weights  3. Continue metoprolol 25 mg daily  4. CHF nurse following for diet education, fluid restriction and daily weights  5. Continue milrinone 0.1 mcg/kg/min  6.   Wrap lower legs discussed with bedside nurse    Start lasix infusion if ok with nephrology     Discussed with patient and  who are agreeable

## 2021-04-20 NOTE — PROCEDURES
[address]     Electrophysiology Procedure Note       Date of Procedure: 4/20/2021  Patient's Name: Claudio Turcios  YOB: 1945   Medical Record Number: [id number]  Procedure Performed by: Dee Castano MD    Procedures performed:    External Electrical cardioversion   IV sedation. Start time: 8:25  Stop time: 8:30    Medication: Brevital Sodium   Sedation medication was given by physician     Indication of the procedure: Persistent atrial fibrillation     Details of procedure: The patient was brought to the cath lab area in a fasting and non-sedated state. The risks, benefits and alternatives of the procedure were discussed with the patient. The patient opted to proceed with the procedure. Written informed consent was signed and placed in the chart. A timeout protocol was completed to identify the patient and the procedure being performed. Then we used brevital for sedation. 70  mg Brevital was given by me as one dose, and electrical DC cardioversion was perfomred using 200J, synchronized shock x 2    Each time patient was converted to sinus rhythm but atrial fibrillation recurred. The patient tolerated the procedure well and there were no complications. Conclusion:   Failed external DC cardioversion of atrial fibrillation.

## 2021-04-21 LAB
ANION GAP SERPL CALCULATED.3IONS-SCNC: 10 MMOL/L (ref 3–16)
BUN BLDV-MCNC: 27 MG/DL (ref 7–20)
CALCIUM SERPL-MCNC: 8.1 MG/DL (ref 8.3–10.6)
CHLORIDE BLD-SCNC: 100 MMOL/L (ref 99–110)
CO2: 27 MMOL/L (ref 21–32)
CREAT SERPL-MCNC: 1.4 MG/DL (ref 0.6–1.2)
GFR AFRICAN AMERICAN: 44
GFR NON-AFRICAN AMERICAN: 37
GLUCOSE BLD-MCNC: 113 MG/DL (ref 70–99)
GLUCOSE BLD-MCNC: 128 MG/DL (ref 70–99)
GLUCOSE BLD-MCNC: 133 MG/DL (ref 70–99)
GLUCOSE BLD-MCNC: 142 MG/DL (ref 70–99)
GLUCOSE BLD-MCNC: 57 MG/DL (ref 70–99)
GLUCOSE BLD-MCNC: 68 MG/DL (ref 70–99)
GLUCOSE BLD-MCNC: 77 MG/DL (ref 70–99)
GLUCOSE BLD-MCNC: 77 MG/DL (ref 70–99)
GLUCOSE BLD-MCNC: 87 MG/DL (ref 70–99)
INR BLD: 2.76 (ref 0.86–1.14)
MAGNESIUM: 2 MG/DL (ref 1.8–2.4)
PERFORMED ON: ABNORMAL
PERFORMED ON: NORMAL
POTASSIUM REFLEX MAGNESIUM: 3.5 MMOL/L (ref 3.5–5.1)
PROTHROMBIN TIME: 32.4 SEC (ref 10–13.2)
SODIUM BLD-SCNC: 137 MMOL/L (ref 136–145)

## 2021-04-21 PROCEDURE — 2500000003 HC RX 250 WO HCPCS

## 2021-04-21 PROCEDURE — 6370000000 HC RX 637 (ALT 250 FOR IP): Performed by: INTERNAL MEDICINE

## 2021-04-21 PROCEDURE — 6370000000 HC RX 637 (ALT 250 FOR IP): Performed by: NURSE PRACTITIONER

## 2021-04-21 PROCEDURE — 6360000002 HC RX W HCPCS: Performed by: INTERNAL MEDICINE

## 2021-04-21 PROCEDURE — 83735 ASSAY OF MAGNESIUM: CPT

## 2021-04-21 PROCEDURE — 85610 PROTHROMBIN TIME: CPT

## 2021-04-21 PROCEDURE — 99232 SBSQ HOSP IP/OBS MODERATE 35: CPT | Performed by: CLINICAL NURSE SPECIALIST

## 2021-04-21 PROCEDURE — 2580000003 HC RX 258: Performed by: INTERNAL MEDICINE

## 2021-04-21 PROCEDURE — 80048 BASIC METABOLIC PNL TOTAL CA: CPT

## 2021-04-21 PROCEDURE — 2060000000 HC ICU INTERMEDIATE R&B

## 2021-04-21 PROCEDURE — 99233 SBSQ HOSP IP/OBS HIGH 50: CPT | Performed by: NURSE PRACTITIONER

## 2021-04-21 PROCEDURE — 36415 COLL VENOUS BLD VENIPUNCTURE: CPT

## 2021-04-21 RX ORDER — POTASSIUM CHLORIDE 20 MEQ/1
40 TABLET, EXTENDED RELEASE ORAL 2 TIMES DAILY WITH MEALS
Status: DISCONTINUED | OUTPATIENT
Start: 2021-04-21 | End: 2021-05-03

## 2021-04-21 RX ORDER — PREDNISONE 10 MG/1
TABLET ORAL
Qty: 100 TABLET | Refills: 0 | Status: SHIPPED | OUTPATIENT
Start: 2021-04-21 | End: 2021-05-04 | Stop reason: HOSPADM

## 2021-04-21 RX ORDER — POTASSIUM CHLORIDE 750 MG/1
TABLET, EXTENDED RELEASE ORAL
Qty: 90 TABLET | Refills: 0 | Status: SHIPPED | OUTPATIENT
Start: 2021-04-21 | End: 2021-08-03

## 2021-04-21 RX ORDER — OXYCODONE HYDROCHLORIDE 5 MG/1
5 TABLET ORAL EVERY 4 HOURS PRN
Status: DISCONTINUED | OUTPATIENT
Start: 2021-04-21 | End: 2021-05-05 | Stop reason: HOSPADM

## 2021-04-21 RX ORDER — WARFARIN SODIUM 5 MG/1
TABLET ORAL
Qty: 100 TABLET | Refills: 0 | Status: SHIPPED | OUTPATIENT
Start: 2021-04-21 | End: 2021-08-03

## 2021-04-21 RX ORDER — MONTELUKAST SODIUM 10 MG/1
TABLET ORAL
Qty: 90 TABLET | Refills: 0 | Status: SHIPPED | OUTPATIENT
Start: 2021-04-21 | End: 2021-08-03

## 2021-04-21 RX ADMIN — PANTOPRAZOLE SODIUM 40 MG: 40 TABLET, DELAYED RELEASE ORAL at 06:06

## 2021-04-21 RX ADMIN — Medication 10 ML: at 21:41

## 2021-04-21 RX ADMIN — ASPIRIN 81 MG: 81 TABLET, CHEWABLE ORAL at 07:47

## 2021-04-21 RX ADMIN — AMIODARONE HYDROCHLORIDE 400 MG: 200 TABLET ORAL at 07:47

## 2021-04-21 RX ADMIN — AMIODARONE HYDROCHLORIDE 400 MG: 200 TABLET ORAL at 21:41

## 2021-04-21 RX ADMIN — OXYCODONE 5 MG: 5 TABLET ORAL at 06:06

## 2021-04-21 RX ADMIN — INSULIN LISPRO 3 UNITS: 100 INJECTION, SOLUTION INTRAVENOUS; SUBCUTANEOUS at 07:52

## 2021-04-21 RX ADMIN — OXYCODONE 5 MG: 5 TABLET ORAL at 16:08

## 2021-04-21 RX ADMIN — METOPROLOL SUCCINATE 25 MG: 25 TABLET, EXTENDED RELEASE ORAL at 07:47

## 2021-04-21 RX ADMIN — POTASSIUM CHLORIDE 40 MEQ: 1500 TABLET, EXTENDED RELEASE ORAL at 14:06

## 2021-04-21 RX ADMIN — GABAPENTIN 300 MG: 300 CAPSULE ORAL at 21:40

## 2021-04-21 RX ADMIN — INSULIN LISPRO 2 UNITS: 100 INJECTION, SOLUTION INTRAVENOUS; SUBCUTANEOUS at 07:52

## 2021-04-21 RX ADMIN — INSULIN GLARGINE 25 UNITS: 100 INJECTION, SOLUTION SUBCUTANEOUS at 07:52

## 2021-04-21 RX ADMIN — OXYCODONE 5 MG: 5 TABLET ORAL at 21:40

## 2021-04-21 RX ADMIN — OXYCODONE 5 MG: 5 TABLET ORAL at 11:38

## 2021-04-21 RX ADMIN — ACETAMINOPHEN 650 MG: 325 TABLET ORAL at 07:18

## 2021-04-21 RX ADMIN — MONTELUKAST SODIUM 10 MG: 10 TABLET, FILM COATED ORAL at 21:41

## 2021-04-21 RX ADMIN — FUROSEMIDE 5 MG/HR: 10 INJECTION, SOLUTION INTRAMUSCULAR; INTRAVENOUS at 05:06

## 2021-04-21 RX ADMIN — WARFARIN SODIUM 5 MG: 5 TABLET ORAL at 17:39

## 2021-04-21 RX ADMIN — INSULIN LISPRO 3 UNITS: 100 INJECTION, SOLUTION INTRAVENOUS; SUBCUTANEOUS at 16:52

## 2021-04-21 ASSESSMENT — PAIN DESCRIPTION - ONSET: ONSET: ON-GOING

## 2021-04-21 ASSESSMENT — PAIN DESCRIPTION - ORIENTATION
ORIENTATION: LOWER
ORIENTATION: RIGHT;LEFT

## 2021-04-21 ASSESSMENT — PAIN SCALES - GENERAL
PAINLEVEL_OUTOF10: 9
PAINLEVEL_OUTOF10: 6
PAINLEVEL_OUTOF10: 10
PAINLEVEL_OUTOF10: 9

## 2021-04-21 ASSESSMENT — PAIN DESCRIPTION - FREQUENCY: FREQUENCY: CONTINUOUS

## 2021-04-21 ASSESSMENT — PAIN DESCRIPTION - PAIN TYPE: TYPE: CHRONIC PAIN

## 2021-04-21 NOTE — PLAN OF CARE
Problem: Pain:  Goal: Control of acute pain  Description: Control of acute pain  Outcome: Ongoing     Problem: Cardiovascular  Goal: Understanding of dietary restrictions  4/21/2021 1632 by Janice Juares RN  Outcome: Met This Shift     Problem: Respiratory  Goal: O2 Sat > 90%  4/21/2021 1632 by Janice Juares RN  Outcome: Met This Shift

## 2021-04-21 NOTE — PROGRESS NOTES
[facility]   Electrophysiology Progress Note     Date: 4/21/2021  Admit Date: 4/15/2021     Reason for consultation: Atrial fibrillation    Chief Complaint:   Chief Complaint   Patient presents with    Chest Pain    Back Pain       History of Present Illness: History obtained from patient and medical record. Michelle Romero is a 77 y.o. female with a past medical history of HTN, HLD, DM, CAD s/p CABG x 3, S/p bioprosthetic MVR, WAYNE clip (8/2018), afib s/p Maze 2018, CMP (EF25-30%) DVT/PE on warfarin and sCHF follows with HF. S/p ablation of afib w/ PVI, roofline, posterior, CTI flutter 10/30/2019. S/p ILR. Hx of Mobitz I AVB and prolonged UT interval. S/p JUDE/CV. Has been treated with amiodarone stopped in 2018.      Presented with palpitations and tachycardia, found to be in afib/RVR and started on diltiazem gtt. Reportedly her carvedilol was reduced recently. Interval Hx: Today, she is being seen for follow up. S/p failed DCCV yesterday. She remains in controlled AF today (short period of sinus rhythm this AM). Pt is on lasix and primacor gtts. We discussed her echo results. Patient seen and examined. Clinical notes reviewed. Telemetry reviewed. No new complaints today. No major events overnight. Denies having chest pain, palpitations, shortness of breath, orthopnea/PND, cough, or dizziness at the time of this visit. Allergies: Allergies   Allergen Reactions    Dkmdcavs-Avtedkt-Zdvnme [Fluocinolone] Shortness Of Breath    Ciprofloxacin Shortness Of Breath    Diovan [Valsartan] Shortness Of Breath    Flagyl [Metronidazole] Shortness Of Breath     Has taken diflucan at home 12/7/15    Metformin And Related [Metformin And Related] Shortness Of Breath    Benazepril      Other reaction(s): Not Recorded    Morphine      Bad reaction. \"makes her feel horrible\".      Saxagliptin      Other reaction(s): Not Recorded    Levaquin [Levofloxacin] Rash     Home Meds:  Prior to Visit Medications    Medication Sig Taking? Authorizing Provider   spironolactone (ALDACTONE) 25 MG tablet Take 2 tablets by mouth daily Yes Violeta Hernandez Avers, APRN - CNS   amoxicillin-clavulanate (AUGMENTIN) 875-125 MG per tablet Take 1 tablet by mouth 2 times daily for 10 days Yes Gely Arora MD   torsemide (DEMADEX) 20 MG tablet 40mg morning, 40mg afternoon, 20mg evening Yes Margaret Andre MD   carvedilol (COREG) 6.25 MG tablet Take 1 tablet by mouth daily Yes Margaret Andre MD   gabapentin (NEURONTIN) 300 MG capsule Take 1 capsule by mouth nightly for 90 days. Intended supply: 30 days Yes Penelope Courtney MD   insulin glargine (LANTUS SOLOSTAR) 100 UNIT/ML injection pen INJECT 26 UNITS IN THE MORNING AND 18 UNITS AT BEDTIME Yes Tavon Evans MD   exenatide (BYETTA 10 MCG PEN) 10 MCG/0.04ML injection Inject 0.04 mLs into the skin 2 times daily (with meals) Yes Penelope Courtney MD   OXYCODONE HCL PO Take 10 mg by mouth As of 1/21/2021,  states patient is taking 10mg with edge being taken off her pain after 3 hours.  Yes Historical Provider, MD   ACETAMINOPHEN PO Take 500 mg by mouth every 6 hours as needed  Yes Historical Provider, MD   albuterol sulfate  (90 Base) MCG/ACT inhaler USE 2 INHALATIONS EVERY 6 HOURS AS NEEDED FOR WHEEZING Yes Penelope Courtney MD   albuterol (PROVENTIL) (2.5 MG/3ML) 0.083% nebulizer solution Take 3 mLs by nebulization every 6 hours as needed for Wheezing Yes Penelope Courtney MD   polyethylene glycol (GLYCOLAX) 17 GM/SCOOP powder Take 17 g by mouth every other day  Yes Historical Provider, MD   omeprazole (PRILOSEC) 20 MG delayed release capsule Take 20 mg by mouth daily Yes Historical Provider, MD   ondansetron (ZOFRAN) 4 MG tablet Take 1 tablet by mouth 3 times daily as needed for Nausea or Vomiting Yes Penelope Courtney MD   aspirin 81 MG chewable tablet Take 1 tablet by mouth daily Yes Penelope Courtney MD   vitamin B-12 500 MCG tablet Take 1 tablet by mouth daily Yes Keon Carey MD   potassium chloride (KLOR-CON M) 10 MEQ extended release tablet TAKE 1 TABLET DAILY  Gatito Lemus MD   predniSONE (DELTASONE) 10 MG tablet TAKE 1 TABLET AS NEEDED (EOSINOPHILIC GASATRITIS)  Gatito Lemus MD   montelukast (SINGULAIR) 10 MG tablet TAKE 1 TABLET NIGHTLY  Gatito Lemus MD   warfarin (COUMADIN) 5 MG tablet TAKE 1 TABLET DAILY  Gatito Lemus MD   oxyCODONE (ROXICODONE) 5 MG immediate release tablet Take 1 tablet by mouth every 6 hours as needed for Pain for up to 30 days. Gatito Lemus MD   blood glucose test strips (FREESTYLE LITE) strip USE TO TEST FOUR TIMES A DAY  Gatito Lemus MD   FreeStyle Lancets MISC 1 each by Does not apply route 4 times daily  Gatito Lemus MD   Blood Glucose Monitoring Suppl (EMBRACE PRO GLUCOSE METER) GAETANO 1 Device by Does not apply route 4 times daily  Gatito Lemus MD   HUMALOG KWIKPEN 100 UNIT/ML SOPN TAKE AS INSTRUCTED BY YOUR PRESCRIBER  Patient taking differently:  Only if high blood sugar-has not been using  Gatito Lemus MD   nystatin (MYCOSTATIN) 383711 UNIT/ML suspension TAKE ONE TEASPOONFUL BY MOUTH FOUR TIMES A DAY FOR 5 DAYS  Gatito Lemus MD   BD PEN NEEDLE JANNET U/F 32G X 4 MM MISC USE 1 PEN NEEDLE FOUR TIMES A DAY  Gatito Lemus MD      Scheduled Meds:   insulin lispro  3 Units Subcutaneous TID     metoprolol succinate  25 mg Oral Daily    aspirin  81 mg Oral Daily    gabapentin  300 mg Oral Nightly    insulin glargine  25 Units Subcutaneous Daily    montelukast  10 mg Oral Nightly    pantoprazole  40 mg Oral QAM AC    sodium chloride flush  5-40 mL Intravenous 2 times per day    insulin lispro  0-12 Units Subcutaneous TID WC    insulin lispro  0-6 Units Subcutaneous Nightly    warfarin  2.5 mg Oral Once per day on Mon    warfarin  5 mg Oral Once per day on Sun Tue Wed Thu Fri Sat    amiodarone  400 mg Oral BID     Continuous Infusions:   furosemide weakness  · Skin: Negative for rash, dry skin, pruritus, bruising, bleeding, blood clots, or changes in skin pigment  · HEENT: Negative for vision changes, ringing in the ears, sore throat, dysphagia, or swollen lymph nodes  · Respiratory: Positive for SOB/LEVIN  · Cardiovascular: Reviewed in HPI  · Gastrointestinal: Negative for abdominal pain, N/V/D, constipation, or black/tarry stools  · Genito-Urinary: Negative for dysuria, incontinence, urgency, or hematuria  · Musculoskeletal: Negative for joint swelling, muscle pain, or injuries  · Neurological/Psych: Negative for confusion, seizures, headaches, balance issues or TIA-like symptoms. No anxiety, depression, or insomnia    Physical Examination:  Vitals:    04/21/21 0916   BP:    Pulse: 87   Resp:    Temp:    SpO2:       In: 692.4 [P.O.:560; I.V.:132.4]  Out: 3350    Wt Readings from Last 3 Encounters:   04/21/21 209 lb 9.6 oz (95.1 kg)   04/13/21 205 lb (93 kg)   04/02/21 206 lb 9.6 oz (93.7 kg)       Intake/Output Summary (Last 24 hours) at 4/21/2021 1112  Last data filed at 4/21/2021 0751  Gross per 24 hour   Intake 1425.88 ml   Output 3500 ml   Net -2074.12 ml       Telemetry: Personally Reviewed  - Atrial fibrillation, rate 80-100s   · Constitutional: Cooperative and in no apparent distress, and appears well nourished  · Skin: Warm and pink; no pallor, cyanosis, bruising, or clubbing  · HEENT: Symmetric and normocephalic. PERRL, EOM intact. Conjunctiva pink with clear sclera. Mucus membranes pink and moist. Teeth intact. Thyroid smooth without nodules or goiter. · Cardiovascular: Regular rate and irregular rhythm. S1/S2 present without rubs, or gallops. + Murmur. Peripheral pulses 2+, capillary refill < 3 seconds. No elevation of JVP. + BLE edema  · Respiratory: Respirations symmetric and unlabored. Lungs clear to auscultation bilaterally, no wheezing, crackles, or rhonchi  · Gastrointestinal: Abdomen soft and round.  Bowel sounds normoactive in all quadrants without tenderness or masses. + obese  · Musculoskeletal: Bilateral upper and lower extremity strength 5/5 with full ROM  · Neurologic/Psych: Awake and orientated to person, place and time. Calm affect, appropriate mood    Pertinent labs, diagnostic, device, and imaging results reviewed as a part of this visit    Labs:    BMP:   Recent Labs     21  0436 21  0428 21  0441   * 135* 137   K 4.0 3.7 3.5    100 100   CO2 25 26 27   PHOS 3.4  --   --    BUN 36* 31* 27*   CREATININE 1.9* 1.7* 1.4*   MG  --   --  2.00     Estimated Creatinine Clearance: 43 mL/min (A) (based on SCr of 1.4 mg/dL (H)). CBC: No results for input(s): WBC, HGB, HCT, MCV, PLT in the last 72 hours. Thyroid:   Lab Results   Component Value Date    TSH 1.90 2021     Lipids:   Lab Results   Component Value Date    CHOL 149 2020    HDL 40 2020    TRIG 94 2021     LFTS:   Lab Results   Component Value Date    ALT 10 04/15/2021    AST 15 04/15/2021    ALKPHOS 215 04/15/2021    PROT 6.6 04/15/2021    AGRATIO 0.8 04/15/2021    BILITOT 0.6 04/15/2021     Cardiac Enzymes:   Lab Results   Component Value Date    TROPONINI 0.02 04/15/2021    TROPONINI 0.02 04/15/2021    TROPONINI <0.01 05/10/2020     Coags:   Lab Results   Component Value Date    PROTIME 32.4 2021    INR 2.76 2021       EC/15/21  Atrial fibrillation    Echo: 21   Overall left ventricular systolic function is severely depressed . Ejection fraction is visually estimated to be 10-15 %. E/e'= 23.2 GLS=-3.4   Moderately dilated left ventricle. There is severe diffuse hypokinesis. Indeterminate diastolic function. A bioprosthetic mitral valve is well seated with peak velocity of 1.59m/s and a mean gradient of 3mmHg. The left atrium is moderately dilated. Mild aortic stenosis with a peak velocity of 2.5m/s and a mean pressure gradient of 14mmHg.  The aortic valve is thickened/calcified with decreased leaflet mobility consistent with aortic stenosis. Mild to moderate tricuspid regurgitation. Estimated pulmonary artery systolic pressure is mildly to moderately elevated at 46 mmHg assuming a right atrial pressure of 8 mmHg. ECHO: 10/20   Mildly dilated left ventricular size and normal wall thickness. Global left   ventricular function is severely decreased with ejection fraction estimated   at 25%. Patient is in atrial fibrillation during procedure. The bioprosthetic mitral valve is well seated with a mean gradient of 5 mmHg   and maximum pressure gradient of 15 mmHg with heart rate of 89 bpm. Pressure   half time of 82 ms. There is trivial mitral regurgitation. Left atrial enlargement. Spontaneous echo contrast seen in the left atrium. A large stump of the left atrial appendage with no thrombus noted. Patient   has history of surgical ligation of the left atrial appendage. There are   spontaneous echo contrast in the atrial appendage. The aortic valve is thickened/calcified with decreased leaflet mobility consistent with aortic stenosis. There is trivial aortic insufficiency. There is moderate tricuspid regurgitation    Stress Test: 8/18   Small sized lateral completely reversible defect of mild intensity    consistent with ischemia in the territory of the LCx and/or LAD .    Normal LV function.    Overall findings represent a intermediate risk scan.      [facility]: 8/2018  Heavily calcified vessels  Mild AS-Normal LV FXN  90% mid LAD  90% prox Diag 1  90% mid Cx  Mild Dominant RCA     REC: CVTS opinion--Heavy Ca, DM, Chronic AC Rx---CABG vs LAD/Diag stenting-    Problem List:   Patient Active Problem List    Diagnosis Date Noted    Atrial fibrillation with rapid ventricular response (HCC)     Chest pain     Dyspnea on exertion     Acute kidney injury superimposed on CKD (HCC)     Hypotension     Acute on chronic systolic heart failure due to valvular disease ([facility] [address] 02/26/2021    Stage 3 chronic kidney disease fibrosis. Patient would like to continue with it.     ~ Continue oral loading 400 mg BID x1 week, then 200 mg BID x1 week, then 200 mg daily   ~ ALT 10, AST 15, TSH 1.67 (4/15/21)    - [id number] score:high ; [id number] Vasc score and anticoagulation discussed. High risk for stroke and thromboembolism. Anticoagulation is recommended. Risk of bleeding was discussed.  ~ On coumadin; INR 2.75. Appreciate pharmacy assistance with dosing. May need to switch to heparin gtt vs lovenox in anticipation for procedures (will discuss with Dr. Leandro Gee)    - Afib risk factors including age, HTN, obesity, inactivity and IRIS were discussed with patient. Risk factor modification recommended      - Treatment options including cardioversion, rate control strategy, antiarrhythmics, anticoagulation and possible ablation were discussed with patient. Risks, benefits and alternative of each treatment options were explained. All questions answered    ~ Will plan for repeat DCCV in a few weeks as outpatient after further amiodarone loading and improvement in her CHF    2. Acute on Chronic systolic heart failure (NYHA Class IV)  - Appears decompensated   ~ EF 10-15% per echo (11/20)  - Continue with Toprol XL 25 mg QD  ~ ACE-I/ARB contraindicated due to renal insufficiency and risk of hyperkalemia   - On lasix and primacor gtts   - Monitor I&Os, daily weights   - CHF team following    3. ICM   - Worsening; EF now down to 10-15% (Been on OMT prior to admission)   - Consider ischemic work up ?     - Discussed with Dr. Leandro Gee; will plan for AICD placement once more stable from CHF likely early next week         ~ Decision aid tool for patient considering ICD implantation for primary prevention \"[place] Program for Patient-Centered Decision\" were used for shared decision making and a copy was given to patient for review.                  ~ Patient has a resonable expectation of survival of more than one year.  Patient is a candidate for AICD implantation for primary prevention.     ~ I have discussed the procedure, risks, benefits and alternatives in detail with patient and family. I gave printed material about AICD implantation, risks and benefits. The risks including, but not limited to, the risks of bleeding, infection, pain, device malfunction, lead dislodgement, radiation exposure, injury to cardiac and surrounding structures (including pneumothorax), stroke, cardiac perforation, tamponade, need for emergent heart surgery, myocardial infarction and death were discussed in detail. The patient/spouse will discuss and let us know    4. CAD  - Hx of CABG (2018)  - Stable  - No complaints of angina  - Continue ASA, BB, and statin    5. HTN  - Controlled: Goal <130/80  - Continue current medications    6. TORIBIO on CKD   - Slightly improved    ~ Creatinin 1.4/BUN 27   - Monitor UO   - Nephrology following    Multiple medical conditions with risk of decompensation. All pertinent information and plan of care discussed with the EP physician. All questions and concerns were addressed to the patient. Alternatives to my treatment were discussed. I have discussed the above stated plan with patient and the nurse. The patient verbalized understanding and agreed with the plan. Thank you for allowing to us to participate in the care of Maco Re.     SOLEDAD Faulkner-CNP  [facility]   Office: [phone]

## 2021-04-21 NOTE — PROGRESS NOTES
[facility]    4/21/2021 11:03 AM        Name: Marcus Gomes .               Admitted: 4/15/2021  Primary Care Provider: Tanner Batista MD (Tel: [phone])            Subjective:    Sitting in chair having lower back pain that occurs at home on and off denies any shortness of breath lower extremity swelling is improved  Reviewed interval ancillary notes    Current Medications  furosemide (LASIX) 100 mg in dextrose 5 % 100 mL infusion, Continuous  milrinone (PRIMACOR) 20 mg in dextrose 5 % 100 mL infusion, Continuous  insulin lispro (1 Unit Dial) 3 Units, TID WC  metoprolol succinate (TOPROL XL) extended release tablet 25 mg, Daily  albuterol (PROVENTIL) nebulizer solution 2.5 mg, Q6H PRN  aspirin chewable tablet 81 mg, Daily  gabapentin (NEURONTIN) capsule 300 mg, Nightly  insulin glargine (LANTUS;BASAGLAR) injection pen 25 Units, Daily  montelukast (SINGULAIR) tablet 10 mg, Nightly  pantoprazole (PROTONIX) tablet 40 mg, QAM AC  oxyCODONE (ROXICODONE) immediate release tablet 5 mg, Q6H PRN  sodium chloride flush 0.9 % injection 5-40 mL, 2 times per day  sodium chloride flush 0.9 % injection 5-40 mL, PRN  0.9 % sodium chloride infusion, PRN  promethazine (PHENERGAN) tablet 12.5 mg, Q6H PRN    Or  ondansetron (ZOFRAN) injection 4 mg, Q6H PRN  polyethylene glycol (GLYCOLAX) packet 17 g, Daily PRN  acetaminophen (TYLENOL) tablet 650 mg, Q6H PRN    Or  acetaminophen (TYLENOL) suppository 650 mg, Q6H PRN  insulin lispro (1 Unit Dial) 0-12 Units, TID WC  insulin lispro (1 Unit Dial) 0-6 Units, Nightly  glucose (GLUTOSE) 40 % oral gel 15 g, PRN  dextrose 50 % IV solution, PRN  glucagon (rDNA) injection 1 mg, PRN  dextrose 5 % solution, PRN  warfarin (COUMADIN) tablet 2.5 mg, Once per day on Mon  warfarin (COUMADIN) tablet 5 mg, Once per day on Sun Tue Wed Thu Fri Sat  amiodarone (CORDARONE) tablet 400 mg, BID        Objective:  /63   Pulse 87   Temp 98 °F (36.7 °C) (Oral)   Resp 17   Ht 5' 8\" (1.727 m)   Wt 209 lb 9.6 oz (95.1 kg)   SpO2 98%   BMI 31.87 kg/m²     Intake/Output Summary (Last 24 hours) at 4/21/2021 1103  Last data filed at 4/21/2021 0751  Gross per 24 hour   Intake 1425.88 ml   Output 3500 ml   Net -2074.12 ml      Wt Readings from Last 3 Encounters:   04/21/21 209 lb 9.6 oz (95.1 kg)   04/13/21 205 lb (93 kg)   04/02/21 206 lb 9.6 oz (93.7 kg)       General appearance:  Appears comfortable. AAOx3  HEENT: atraumatic, Pupils equal, muscous membranes moist, no masses appreciated  Cardiovascular irregulary irregular no murmurs appreciated  Respiratory: CTAB no wheezing  Gastrointestinal: Abdomen soft, non-tender, BS+  EXT: 1+ edema  MSK: left lower bacvk tender to palpation no spinal tenderness  Neurology: no gross focal deficts  Psychiatry: Appropriate affect. Not agitated  Skin: Warm, dry, no rashes appreciated    Labs and Tests:  CBC:   No results for input(s): WBC, HGB, PLT in the last 72 hours. BMP:    Recent Labs     04/19/21  0436 04/20/21  0428 04/21/21  0441   * 135* 137   K 4.0 3.7 3.5    100 100   CO2 25 26 27   BUN 36* 31* 27*   CREATININE 1.9* 1.7* 1.4*   GLUCOSE 142* 129* 113*     Hepatic:   No results for input(s): AST, ALT, ALB, BILITOT, ALKPHOS in the last 72 hours. XR CHEST PORTABLE   Final Result   No acute cardiopulmonary disease. Stable cardiomegaly with no evidence of   overt failure. Recent imaging reviewed    Problem List  Active Problems:    PAF (paroxysmal atrial fibrillation) (HCC)    Chronic systolic CHF (congestive heart failure), NYHA class 3 (HCC)    Atrial fibrillation with rapid ventricular response (HCC)    Chest pain    Dyspnea on exertion    Acute kidney injury superimposed on CKD ([address]  Resolved Problems:    * No resolved hospital problems.  *          Assessment/Plan:   PAf with rvr: failed cardioversion  -amiodarone  - coumadin     Chest pain likely secondary   - troponin stable, cards on board     Acute on chronic systolic heart failure: ef 20-25  - home meds  - lasix gtt  - milirinon    TORIBIO: lkely secondary to lasix and hypotensin with afib  - improving     Dm2 with hyperglycemia:improved monitor     MSK back pain:  Will try heat pack titrate up percocet     DVT prophylaxis coumadin  Code status full code        Jennifer Mcclure MD   4/21/2021 11:03 AM

## 2021-04-21 NOTE — PROGRESS NOTES
[address]   Daily Progress Note      Admit Date:  4/15/2021    HPI:    Ms. Fady Chan 77 y.o. female with history of CAD/CABG in 2018, PAF with WAYNE clip 8/18 and maze 2018, ablation of AF, s/p ILR, HTN, HLD, DVT/PE on coumadin, 2 CVs unsuccessful, sHF    She is admitted with palpitations and tachycardia, found to be in AF with RVR and started diltiazem gtt. probnp 8766->15K (best is around 5K)  Unsuccessful CV x2    Subjective:  Patient is being seen for chronic sHF. There were no acute overnight cardiac events. Creat 1.4 potassium 3.5 weight down 3 pounds LVEF 10-15%. She continues on milrinone and lasix infusions. Her biggest complaint today is her back. She is trying heating pad and took pain pill (usually takes 2 at home). Her weight is slowly decreasing. No increase sob or palpitations. Still AF    Objective:   /63   Pulse 87   Temp 98 °F (36.7 °C) (Oral)   Resp 17   Ht 5' 8\" (1.727 m)   Wt 209 lb 9.6 oz (95.1 kg)   SpO2 98%   BMI 31.87 kg/m²       Intake/Output Summary (Last 24 hours) at 4/21/2021 1019  Last data filed at 4/21/2021 0751  Gross per 24 hour   Intake 1425.88 ml   Output 3500 ml   Net -2074.12 ml          Physical Exam:  General:  Awake, alert, oriented in NAD  Skin:  Warm and dry. No unusual bruising or rash  Neck:  Supple. + JVD or carotid bruit appreciated  Chest:  Normal effort.   Clear to auscultation, no wheezes/rhonchi/rales  Cardiovascular:  irreg, S1/S2, no murmur/gallop/rub  Abdomen:  Soft, nontender, +bowel sounds  Extremities:  Bilateral ankle to lower thigh edema  Neurological: No focal deficits  Psychological: Normal mood and affect      Medications:    insulin lispro  3 Units Subcutaneous TID WC    metoprolol succinate  25 mg Oral Daily    aspirin  81 mg Oral Daily    gabapentin  300 mg Oral Nightly    insulin glargine  25 Units Subcutaneous Daily    montelukast  10 mg Oral Nightly    pantoprazole  40 mg Oral QAM AC    sodium chloride flush 5-40 mL Intravenous 2 times per day    insulin lispro  0-12 Units Subcutaneous TID     insulin lispro  0-6 Units Subcutaneous Nightly    warfarin  2.5 mg Oral Once per day on Mon    warfarin  5 mg Oral Once per day on Sun Tue Wed Thu Fri Sat    amiodarone  400 mg Oral BID      furosemide (LASIX) 1mg/ml infusion 5 mg/hr (04/21/21 0751)    milrinone 0.1 mcg/kg/min (04/21/21 0751)    sodium chloride      dextrose         Lab Data:  CBC: No results for input(s): WBC, HGB, PLT in the last 72 hours. BMP:    Recent Labs     04/19/21  0436 04/20/21  0428 04/21/21  0441   * 135* 137   K 4.0 3.7 3.5   CO2 25 26 27   BUN 36* 31* 27*   CREATININE 1.9* 1.7* 1.4*     INR:    Recent Labs     04/19/21  0436 04/20/21  0427 04/21/21  0441   INR 2.75* 2.52* 2.76*     BNP:    Recent Labs     04/18/21  1728   PROBNP 15,113*         Diagnostics:  Echo 4/20/21  Summary   Overall left ventricular systolic function is severely depressed . Ejection fraction is visually estimated to be 10-15 %. E/e'= 23.2 GLS=-3.4   Moderately dilated left ventricle. There is severe diffuse hypokinesis. Indeterminate diastolic function. A bioprosthetic mitral valve is well seated with peak velocity of 1.59m/s   and a mean gradient of 3mmHg. The left atrium is moderately dilated. Mild aortic stenosis with a peak velocity of 2.5m/s and a mean pressure   gradient of 14mmHg. The aortic valve is thickened/calcified with decreased leaflet mobility   consistent with aortic stenosis. Mild to moderate tricuspid regurgitation.    Estimated pulmonary artery systolic pressure is mildly to moderately   elevated at 46 mmHg assuming a right atrial pressure of 8 mmHg    11/30/2020 Dobutamine Echo   Dobutamine echocardiogram for aortic stenosis.   Very technically limited exam due to atrial fibrillation.   Baseline echocardiogram shows severe left ventricular dysfunction with   anterior, lateral and apical hypokinesis with an ejection fraction of 20-25%.  Mauri Duster is no improvement in wall motion with low or high dose dobutamine   suggestive of nonviable myocardium.      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 After dobutamine stress the mean AV   gradient rises to 20mmHg with 20mcg of dobutamine consistent with mild to   moderate aortic stenosis.  Prosthetic mitral valve with a mean gradient of 7mmHg        JUDE 10/21/2020  Mildly dilated left ventricular size and normal wall thickness. Global left ventricular function is severely decreased with ejection fraction estimated at 25%. Patient is in atrial fibrillation during procedure.      The bioprosthetic mitral valve is well seated with a mean gradient of 5mmHg and maximum pressure gradient of 15 mmHg with heart rate of 89 bpm. Pressure half time of 82 ms. There is trivial mitral regurgitation.      Left atrial enlargement. Spontaneous echo contrast seen in the left atrium. A large stump of the left atrial appendage with no thrombus noted. Patient has history of surgical ligation of the left atrial appendage. There are spontaneous echo contrast in the atrial appendage.      The aortic valve is thickened/calcified with decreased leaflet mobility consistent with aortic stenosis. There is trivial aortic insufficiency.      There is moderate tricuspid regurgitation.     ECHO 9/15/20  Left ventricular cavity size is dilated. There is normal wall thickness with a moderately severe reduction in   systolic function.   LV ejection fraction is visually estimated to be 25-30%.   Indeterminate diastolic function.   The right ventricle is not well visualized but appears to be normal in size with moderately reduced function.   The left atrium is moderately dilated.   A bioprosthetic mitral valve with a mean gradient of 7 mmHg.  This may be a normal gradient for this valve but could suggest mild stenosis.   Trivial mitral regurgitation.   The aortic valve

## 2021-04-21 NOTE — CARE COORDINATION
Chart reviewed for discharge planning. Barrier(s) to discharge-patient with CHF, EF 10-20% per echo  On Lasix and Milrinone gtts    Tentative discharge plan-home, consider referral to [address] [place] [address] [facility] for med management, teaching    Tentative discharge date-when medically stable    *Case management will continue to follow progress and update discharge plan as needed.       Chandler Dunham, PARIN, CCM, RN  [facility]  [phone]

## 2021-04-21 NOTE — PROGRESS NOTES
Continues to have severe pain in lower back. Oxycodone was given at 0600 without relief. Tylenol given now. [facility]  She will nigel RUELAS.

## 2021-04-21 NOTE — PROGRESS NOTES
Nephrology Progress Note        [phone]        [email or web address]          Assessment/Plan:    Non-oliguric TORIBIO on CKD 3:  -Etiology of TORIBIO likely due to diminished renal perfusion from hypotension/AFib/diuretic use  -Cr improving this AM with milrinone/lasix gtts  -Continue lasix gtt at current dose (3L UOp yesterday and not started until afternoon). Can consider increasing dose pending UOp/blood work tomorrow  -Continue to hold spironolactone today  -Strict I/Os and daily weights    CKD 3:  -Presumed due to DN/recurrent AKIs  -Baseline Cr 1.0-1.1  -Will need Nephrology follow up on DC    Hypokalemia:  -Start KDur 40mEq PO BID while on lasix gtt  -Check Mg++ level    Hypertension:  -At goal  -Continue current regimen  -Diuretics as above    Anemia of CKD:  -At goal  -No indication for LAURENT therapy at this time    CKD-MBD:  -At goal    AFib with RVR:  -Failed cardioversion x2 (4/20/21)  -On amiodarone  -Management per EP    Acute on Chronic Systolic CHF Exacerbation:  -11/2020: EF 20-25%  -Repeat Echo shows EF 10-15%  -Started on milrinone gtt (4/19/21)  -Diuretics as above  -Management per Cardiology          Ro Zhang MD, ADONAY  4/21/2021  [facility]                                                                                                                                                                                                                                                                                                    Consult: TORIBIO on CKD  Brief HPI: 77 y.o. female admitted for AFib with RVR, complicated by acute on chronic systolic CHF exacerbation      Subjective:  -Failed cardioversion x2 yesterday  -No acute events overnight  -Cr improving this AM  -Weight improving this AM      Review of Systems: No new/active complaints. All other ROS negative. Social History: Visitor at bedside.  All questions answered      Medications:  Scheduled Meds:   insulin lispro  3 Units Subcutaneous TID     metoprolol succinate  25 mg Oral Daily    aspirin  81 mg Oral Daily    gabapentin  300 mg Oral Nightly    insulin glargine  25 Units Subcutaneous Daily    montelukast  10 mg Oral Nightly    pantoprazole  40 mg Oral QAM AC    sodium chloride flush  5-40 mL Intravenous 2 times per day    insulin lispro  0-12 Units Subcutaneous TID     insulin lispro  0-6 Units Subcutaneous Nightly    warfarin  2.5 mg Oral Once per day on Mon    warfarin  5 mg Oral Once per day on Sun Tue Wed Thu Fri Sat    amiodarone  400 mg Oral BID     Continuous Infusions:   furosemide (LASIX) 1mg/ml infusion 5 mg/hr (04/21/21 0751)    milrinone 0.1 mcg/kg/min (04/21/21 0751)    sodium chloride      dextrose       PRN Meds:.oxyCODONE, albuterol, sodium chloride flush, sodium chloride, promethazine **OR** ondansetron, polyethylene glycol, acetaminophen **OR** acetaminophen, glucose, dextrose, glucagon (rDNA), dextrose        Vitals:  /82   Pulse 110   Temp 97.2 °F (36.2 °C) (Oral)   Resp 16   Ht 5' 8\" (1.727 m)   Wt 209 lb 9.6 oz (95.1 kg)   SpO2 99%   BMI 31.87 kg/m²   I/O last 3 completed shifts: In: 1440.5 [P.O.:1280; I.V.:160.5]  Out: 3200 [Urine:3200]  I/O this shift: In: 740.5 [P.O.:720; I.V.:20.5]  Out: 475 [Urine:475]      Physical Exam:  Gen: NAD  HEENT: Sclera anicteric, MMM  Neck: Supple. No JVD  CV: Irregular, Tachycardic, S1/S2  Pulm: CTAB/L  Abd: Soft, NT, ND  Ext: 2+ pitting edema in B/L LE (improving), in ACE wraps  Neuro: Awake/Alert, Answers questions appropriately  Skin: Warm.  No significant visible rashes appreciated  Psych: Normal affect/mood        Labs:  CBC with Differential:    Lab Results   Component Value Date    WBC 4.0 04/16/2021    RBC 3.68 04/16/2021    HGB 10.0 04/16/2021    HCT 31.6 04/16/2021     04/16/2021    MCV 85.8 04/16/2021    MCH 27.1 04/16/2021    MCHC 31.6 04/16/2021    RDW 16.8 04/16/2021    SEGSPCT 64.1 09/02/2020    BANDSPCT 1

## 2021-04-22 LAB
ALBUMIN SERPL-MCNC: 2.9 G/DL (ref 3.4–5)
ANION GAP SERPL CALCULATED.3IONS-SCNC: 9 MMOL/L (ref 3–16)
BUN BLDV-MCNC: 23 MG/DL (ref 7–20)
CALCIUM SERPL-MCNC: 8.2 MG/DL (ref 8.3–10.6)
CHLORIDE BLD-SCNC: 102 MMOL/L (ref 99–110)
CO2: 26 MMOL/L (ref 21–32)
CREAT SERPL-MCNC: 1.2 MG/DL (ref 0.6–1.2)
GFR AFRICAN AMERICAN: 53
GFR NON-AFRICAN AMERICAN: 44
GLUCOSE BLD-MCNC: 115 MG/DL (ref 70–99)
GLUCOSE BLD-MCNC: 132 MG/DL (ref 70–99)
GLUCOSE BLD-MCNC: 135 MG/DL (ref 70–99)
GLUCOSE BLD-MCNC: 91 MG/DL (ref 70–99)
GLUCOSE BLD-MCNC: 99 MG/DL (ref 70–99)
INR BLD: 3.23 (ref 0.86–1.14)
MAGNESIUM: 1.7 MG/DL (ref 1.8–2.4)
PERFORMED ON: ABNORMAL
PERFORMED ON: NORMAL
PHOSPHORUS: 3.4 MG/DL (ref 2.5–4.9)
POTASSIUM SERPL-SCNC: 3.4 MMOL/L (ref 3.5–5.1)
PRO-BNP: 6712 PG/ML (ref 0–449)
PROTHROMBIN TIME: 37.9 SEC (ref 10–13.2)
SODIUM BLD-SCNC: 137 MMOL/L (ref 136–145)

## 2021-04-22 PROCEDURE — 83735 ASSAY OF MAGNESIUM: CPT

## 2021-04-22 PROCEDURE — 6370000000 HC RX 637 (ALT 250 FOR IP): Performed by: INTERNAL MEDICINE

## 2021-04-22 PROCEDURE — 6360000002 HC RX W HCPCS: Performed by: INTERNAL MEDICINE

## 2021-04-22 PROCEDURE — 80069 RENAL FUNCTION PANEL: CPT

## 2021-04-22 PROCEDURE — 2580000003 HC RX 258: Performed by: CLINICAL NURSE SPECIALIST

## 2021-04-22 PROCEDURE — 6360000002 HC RX W HCPCS: Performed by: CLINICAL NURSE SPECIALIST

## 2021-04-22 PROCEDURE — 6370000000 HC RX 637 (ALT 250 FOR IP): Performed by: NURSE PRACTITIONER

## 2021-04-22 PROCEDURE — 2580000003 HC RX 258: Performed by: INTERNAL MEDICINE

## 2021-04-22 PROCEDURE — 83880 ASSAY OF NATRIURETIC PEPTIDE: CPT

## 2021-04-22 PROCEDURE — 2060000000 HC ICU INTERMEDIATE R&B

## 2021-04-22 PROCEDURE — 99233 SBSQ HOSP IP/OBS HIGH 50: CPT | Performed by: NURSE PRACTITIONER

## 2021-04-22 PROCEDURE — 85610 PROTHROMBIN TIME: CPT

## 2021-04-22 PROCEDURE — 6370000000 HC RX 637 (ALT 250 FOR IP): Performed by: PHYSICIAN ASSISTANT

## 2021-04-22 PROCEDURE — 99232 SBSQ HOSP IP/OBS MODERATE 35: CPT | Performed by: CLINICAL NURSE SPECIALIST

## 2021-04-22 RX ORDER — OXYCODONE HYDROCHLORIDE 5 MG/1
5 TABLET ORAL ONCE
Status: COMPLETED | OUTPATIENT
Start: 2021-04-22 | End: 2021-04-22

## 2021-04-22 RX ORDER — OXYCODONE HYDROCHLORIDE 5 MG/1
10 TABLET ORAL ONCE
Status: DISCONTINUED | OUTPATIENT
Start: 2021-04-22 | End: 2021-04-22

## 2021-04-22 RX ORDER — POTASSIUM CHLORIDE 20 MEQ/1
40 TABLET, EXTENDED RELEASE ORAL ONCE
Status: COMPLETED | OUTPATIENT
Start: 2021-04-22 | End: 2021-04-22

## 2021-04-22 RX ORDER — SPIRONOLACTONE 25 MG/1
12.5 TABLET ORAL DAILY
Status: DISCONTINUED | OUTPATIENT
Start: 2021-04-22 | End: 2021-05-03

## 2021-04-22 RX ORDER — MAGNESIUM SULFATE IN WATER 40 MG/ML
2000 INJECTION, SOLUTION INTRAVENOUS ONCE
Status: COMPLETED | OUTPATIENT
Start: 2021-04-22 | End: 2021-04-22

## 2021-04-22 RX ADMIN — FUROSEMIDE 5 MG/HR: 10 INJECTION, SOLUTION INTRAMUSCULAR; INTRAVENOUS at 01:47

## 2021-04-22 RX ADMIN — MAGNESIUM SULFATE HEPTAHYDRATE 2000 MG: 40 INJECTION, SOLUTION INTRAVENOUS at 10:12

## 2021-04-22 RX ADMIN — AMIODARONE HYDROCHLORIDE 400 MG: 200 TABLET ORAL at 10:05

## 2021-04-22 RX ADMIN — AMIODARONE HYDROCHLORIDE 400 MG: 200 TABLET ORAL at 21:34

## 2021-04-22 RX ADMIN — MILRINONE LACTATE 0.1 MCG/KG/MIN: 200 INJECTION, SOLUTION INTRAVENOUS at 03:28

## 2021-04-22 RX ADMIN — ACETAMINOPHEN 650 MG: 325 TABLET ORAL at 23:18

## 2021-04-22 RX ADMIN — FUROSEMIDE 10 MG/HR: 10 INJECTION, SOLUTION INTRAMUSCULAR; INTRAVENOUS at 15:54

## 2021-04-22 RX ADMIN — POTASSIUM CHLORIDE 40 MEQ: 1500 TABLET, EXTENDED RELEASE ORAL at 18:07

## 2021-04-22 RX ADMIN — OXYCODONE 5 MG: 5 TABLET ORAL at 21:34

## 2021-04-22 RX ADMIN — INSULIN LISPRO 3 UNITS: 100 INJECTION, SOLUTION INTRAVENOUS; SUBCUTANEOUS at 10:17

## 2021-04-22 RX ADMIN — INSULIN LISPRO 3 UNITS: 100 INJECTION, SOLUTION INTRAVENOUS; SUBCUTANEOUS at 12:28

## 2021-04-22 RX ADMIN — ASPIRIN 81 MG: 81 TABLET, CHEWABLE ORAL at 10:05

## 2021-04-22 RX ADMIN — SPIRONOLACTONE 12.5 MG: 25 TABLET ORAL at 12:25

## 2021-04-22 RX ADMIN — INSULIN LISPRO 3 UNITS: 100 INJECTION, SOLUTION INTRAVENOUS; SUBCUTANEOUS at 18:05

## 2021-04-22 RX ADMIN — MONTELUKAST SODIUM 10 MG: 10 TABLET, FILM COATED ORAL at 21:34

## 2021-04-22 RX ADMIN — PROMETHAZINE HYDROCHLORIDE 12.5 MG: 25 TABLET ORAL at 23:18

## 2021-04-22 RX ADMIN — PANTOPRAZOLE SODIUM 40 MG: 40 TABLET, DELAYED RELEASE ORAL at 05:20

## 2021-04-22 RX ADMIN — OXYCODONE 5 MG: 5 TABLET ORAL at 18:59

## 2021-04-22 RX ADMIN — OXYCODONE 5 MG: 5 TABLET ORAL at 05:20

## 2021-04-22 RX ADMIN — POTASSIUM CHLORIDE 40 MEQ: 1500 TABLET, EXTENDED RELEASE ORAL at 10:05

## 2021-04-22 RX ADMIN — METOPROLOL SUCCINATE 25 MG: 25 TABLET, EXTENDED RELEASE ORAL at 10:06

## 2021-04-22 ASSESSMENT — PAIN - FUNCTIONAL ASSESSMENT
PAIN_FUNCTIONAL_ASSESSMENT: PREVENTS OR INTERFERES WITH MANY ACTIVE NOT PASSIVE ACTIVITIES
PAIN_FUNCTIONAL_ASSESSMENT: PREVENTS OR INTERFERES WITH MANY ACTIVE NOT PASSIVE ACTIVITIES

## 2021-04-22 ASSESSMENT — PAIN DESCRIPTION - ORIENTATION
ORIENTATION: LOWER
ORIENTATION: LOWER

## 2021-04-22 ASSESSMENT — PAIN DESCRIPTION - FREQUENCY
FREQUENCY: CONTINUOUS
FREQUENCY: CONTINUOUS

## 2021-04-22 ASSESSMENT — PAIN SCALES - GENERAL
PAINLEVEL_OUTOF10: 10
PAINLEVEL_OUTOF10: 3

## 2021-04-22 ASSESSMENT — PAIN DESCRIPTION - DESCRIPTORS: DESCRIPTORS: ACHING

## 2021-04-22 ASSESSMENT — PAIN DESCRIPTION - PAIN TYPE
TYPE: CHRONIC PAIN
TYPE: CHRONIC PAIN

## 2021-04-22 ASSESSMENT — PAIN DESCRIPTION - LOCATION
LOCATION: BACK

## 2021-04-22 NOTE — PLAN OF CARE
Problem: Pain:  Goal: Control of acute pain  Description: Control of acute pain  4/22/2021 0420 by Fartun Metz RN  Outcome: Ongoing     Problem: Cardiovascular  Goal: No DVT, peripheral vascular complications  3/62/4680 0420 by Fartun Metz RN  Outcome: Ongoing     Problem: Cardiovascular  Goal: Hemodynamic stability  4/22/2021 0420 by Fartun Metz RN  Outcome: Ongoing     Problem: Cardiovascular  Goal: Anticoagulate/Hct stable  4/22/2021 0420 by Vannesa Greene RN  Outcome: Ongoing     Problem: Cardiovascular  Goal: Agreement to quit smoking  4/22/2021 0420 by Fartun Metz RN  Outcome: Ongoing     Problem: Cardiovascular  Goal: Weight maintained or lost  4/22/2021 0420 by Fartun Metz RN  Outcome: Ongoing     Problem: Cardiovascular  Goal: Understanding of dietary restrictions  4/22/2021 0420 by Fartun Metz RN  Outcome: Ongoing     Problem: Respiratory  Goal: No pulmonary complications  8/55/7443 0420 by Fartun Metz RN  Outcome: Ongoing

## 2021-04-22 NOTE — PROGRESS NOTES
Nutrition Assessment     Type and Reason for Visit: Initial(LOS assessment)    Nutrition Recommendations/Plan:   No nutrition related recommendations at this time     Nutrition Assessment:  Pt assessed for nutrition risk. Pt with good appetite and PO intake, consuming greater than 50% of meals throughout admission. No weight loss noted per hx in EMR. Skin is in tact. Deemed to be at low nutrition risk at this time. Will continue to monitor for changes in status. Malnutrition Assessment:  Malnutrition Status: No malnutrition    Nutrition Related Findings: +2 BLE edema. K+ 3.4. Mg+ 1.7. Current Nutrition Therapies:    DIET CARDIAC; Low Sodium (2 GM);  Daily Fluid Restriction: 2000 ml    Anthropometric Measures:  · Height: 5' 8\" (172.7 cm)  · Current Body Wt: 212 lb 9.6 oz (96.4 kg)   · BMI: 32.3    Nutrition Diagnosis:   No nutrition diagnosis at this time    Nutrition Interventions:   Food and/or Nutrient Delivery:  Continue Current Diet  Nutrition Education/Counseling:  Education not indicated   Coordination of Nutrition Care:  Continue to monitor while inpatient    Goals:  Pt will continue to consume at least 50% of meals       Nutrition Monitoring and Evaluation:   Behavioral-Environmental Outcomes:  None Identified   Food/Nutrient Intake Outcomes:  Food and Nutrient Intake  Physical Signs/Symptoms Outcomes:  None Identified     Discharge Planning:    No discharge needs at this time     Electronically signed by John Griffith RD, LD on 4/22/21 at 2:03 PM EDT    Contact: [phone]

## 2021-04-22 NOTE — PLAN OF CARE
Problem: Pain:  Goal: Control of acute pain  Description: Control of acute pain  4/22/2021 1520 by Antoinette Walters RN  Outcome: Ongoing     Problem: Cardiovascular  Goal: No DVT, peripheral vascular complications  9/95/9212 1520 by Antoinette Walters RN  Outcome: Ongoing     Problem: Respiratory  Goal: No pulmonary complications  2/66/8739 1520 by Antoinette Walters RN  Outcome: Ongoing     Problem: Serum Glucose Level - Abnormal:  Goal: Ability to maintain appropriate glucose levels has stabilized  Description: Ability to maintain appropriate glucose levels has stabilized  4/22/2021 1520 by Antoinette Walters RN  Outcome: Ongoing   A&O x4,  at bedside. Up to bathroom and back to chair independently. Some  complaints of chronic back pain during the shift so far, encouraged to rest and reposition as tolerated,medicated per [facility]. Has not needed sliding scale coverage so far today and long acting insulin was adjusted from 25 units to 15 units this morning. Lasix gtt infusing and was increased to 10 from 5. WCTM.

## 2021-04-22 NOTE — PROGRESS NOTES
Placeholder    insulin glargine  15 Units Subcutaneous Daily    magnesium sulfate  2,000 mg Intravenous Once    potassium chloride  40 mEq Oral BID     insulin lispro  3 Units Subcutaneous TID     metoprolol succinate  25 mg Oral Daily    aspirin  81 mg Oral Daily    gabapentin  300 mg Oral Nightly    montelukast  10 mg Oral Nightly    pantoprazole  40 mg Oral QAM AC    sodium chloride flush  5-40 mL Intravenous 2 times per day    insulin lispro  0-12 Units Subcutaneous TID WC    insulin lispro  0-6 Units Subcutaneous Nightly    amiodarone  400 mg Oral BID     Continuous Infusions:   furosemide (LASIX) 1mg/ml infusion 10 mg/hr (04/22/21 0956)    milrinone 0.098 mcg/kg/min (04/22/21 0659)    sodium chloride      dextrose       PRN Meds:.oxyCODONE, albuterol, sodium chloride flush, sodium chloride, promethazine **OR** ondansetron, polyethylene glycol, acetaminophen **OR** acetaminophen, glucose, dextrose, glucagon (rDNA), dextrose        Vitals:  /76   Pulse 98   Temp 98 °F (36.7 °C) (Oral)   Resp 18   Ht 5' 8\" (1.727 m)   Wt 209 lb 9.6 oz (95.1 kg)   SpO2 94%   BMI 31.87 kg/m²   I/O last 3 completed shifts: In: 1716.1 [P.O.:1515; I.V.:201.1]  Out: 1525 [Urine:1525]  No intake/output data recorded. Physical Exam:  Gen: NAD  HEENT: Sclera anicteric, MMM  Neck: Supple. No JVD  CV: Irregular, Tachycardic, S1/S2  Pulm: CTAB/L  Abd: Soft, NT, ND  Ext: 2+ pitting edema in B/L LE (improving), in ACE wraps  Neuro: Awake/Alert, Answers questions appropriately  Skin: Warm.  No significant visible rashes appreciated  Psych: Normal affect/mood        Labs:  CBC with Differential:    Lab Results   Component Value Date    WBC 4.0 04/16/2021    RBC 3.68 04/16/2021    HGB 10.0 04/16/2021    HCT 31.6 04/16/2021     04/16/2021    MCV 85.8 04/16/2021    MCH 27.1 04/16/2021    MCHC 31.6 04/16/2021    RDW 16.8 04/16/2021    SEGSPCT 64.1 09/02/2020    BANDSPCT 1 01/14/2019    LYMPHOPCT 15.1 04/16/2021    MONOPCT 14.7 04/16/2021    BASOPCT 0.8 04/16/2021    MONOSABS 0.6 04/16/2021    LYMPHSABS 0.6 04/16/2021    EOSABS 0.4 04/16/2021    BASOSABS 0.0 04/16/2021     BMP:    Lab Results   Component Value Date     04/22/2021    K 3.4 04/22/2021    K 3.5 04/21/2021     04/22/2021    CO2 26 04/22/2021    BUN 23 04/22/2021    LABALBU 2.9 04/22/2021    CREATININE 1.2 04/22/2021    CALCIUM 8.2 04/22/2021    GFRAA 53 04/22/2021    LABGLOM 44 04/22/2021    LABGLOM 45 12/06/2018    GLUCOSE 91 04/22/2021     U/A:    Lab Results   Component Value Date    COLORU Yellow 04/15/2021    PROTEINU Negative 04/15/2021    PHUR 6.5 04/15/2021    WBCUA 3 04/15/2021    RBCUA 2 04/15/2021    YEAST neg 02/12/2021    BACTERIA trace 02/12/2021    BACTERIA 1+ 05/11/2020    CLARITYU Clear 04/15/2021    SPECGRAV 1.020 04/15/2021    LEUKOCYTESUR Negative 04/15/2021    UROBILINOGEN 0.2 04/15/2021    BILIRUBINUR Negative 04/15/2021    BLOODU TRACE-INTACT 04/15/2021    GLUCOSEU Negative 04/15/2021

## 2021-04-22 NOTE — PROGRESS NOTES
Home Meds:  Prior to Visit Medications    Medication Sig Taking? Authorizing Provider   spironolactone (ALDACTONE) 25 MG tablet Take 2 tablets by mouth daily Yes Violeta Schuster Check, APRN - CNS   amoxicillin-clavulanate (AUGMENTIN) 875-125 MG per tablet Take 1 tablet by mouth 2 times daily for 10 days Yes Nikky Mao MD   torsemide (DEMADEX) 20 MG tablet 40mg morning, 40mg afternoon, 20mg evening Yes Praneeth James MD   carvedilol (COREG) 6.25 MG tablet Take 1 tablet by mouth daily Yes Praneeth James MD   gabapentin (NEURONTIN) 300 MG capsule Take 1 capsule by mouth nightly for 90 days. Intended supply: 30 days Yes Fabi Lockwood MD   insulin glargine (LANTUS SOLOSTAR) 100 UNIT/ML injection pen INJECT 26 UNITS IN THE MORNING AND 18 UNITS AT BEDTIME Yes Sneha Calderon MD   exenatide (BYETTA 10 MCG PEN) 10 MCG/0.04ML injection Inject 0.04 mLs into the skin 2 times daily (with meals) Yes Fabi Lockwood MD   OXYCODONE HCL PO Take 10 mg by mouth As of 1/21/2021,  states patient is taking 10mg with edge being taken off her pain after 3 hours.  Yes Historical Provider, MD   ACETAMINOPHEN PO Take 500 mg by mouth every 6 hours as needed  Yes Historical Provider, MD   albuterol sulfate  (90 Base) MCG/ACT inhaler USE 2 INHALATIONS EVERY 6 HOURS AS NEEDED FOR WHEEZING Yes Fabi Lockwood MD   albuterol (PROVENTIL) (2.5 MG/3ML) 0.083% nebulizer solution Take 3 mLs by nebulization every 6 hours as needed for Wheezing Yes Fabi Lockwood MD   polyethylene glycol (GLYCOLAX) 17 GM/SCOOP powder Take 17 g by mouth every other day  Yes Historical Provider, MD   omeprazole (PRILOSEC) 20 MG delayed release capsule Take 20 mg by mouth daily Yes Historical Provider, MD   ondansetron (ZOFRAN) 4 MG tablet Take 1 tablet by mouth 3 times daily as needed for Nausea or Vomiting Yes Fabi Lockwood MD   aspirin 81 MG chewable tablet Take 1 tablet by mouth daily Yes Fabi Lockwood MD   vitamin B-12 500 MCG tablet Take 1 tablet by mouth daily Yes Niki Zamora MD   potassium chloride (KLOR-CON M) 10 MEQ extended release tablet TAKE 1 TABLET DAILY  Nuvia Pfeiffer MD   predniSONE (DELTASONE) 10 MG tablet TAKE 1 TABLET AS NEEDED (EOSINOPHILIC GASATRITIS)  Nuvia Pfeiffer MD   montelukast (SINGULAIR) 10 MG tablet TAKE 1 TABLET NIGHTLY  Nuvia Pfeiffer MD   warfarin (COUMADIN) 5 MG tablet TAKE 1 TABLET DAILY  Nuvia Pfeiffer MD   oxyCODONE (ROXICODONE) 5 MG immediate release tablet Take 1 tablet by mouth every 6 hours as needed for Pain for up to 30 days. Nuvia Pfeiffer MD   blood glucose test strips (FREESTYLE LITE) strip USE TO TEST FOUR TIMES A DAY  Nuvia Pfeiffer MD   FreeStyle Lancets MISC 1 each by Does not apply route 4 times daily  Nuvia Pfeiffer MD   Blood Glucose Monitoring Suppl (EMBRACE PRO GLUCOSE METER) GAETANO 1 Device by Does not apply route 4 times daily  Nuvia Pfeiffer MD   HUMALOG KWIKPEN 100 UNIT/ML SOPN TAKE AS INSTRUCTED BY YOUR PRESCRIBER  Patient taking differently:  Only if high blood sugar-has not been using  Nuvia Pfeiffer MD   nystatin (MYCOSTATIN) 419101 UNIT/ML suspension TAKE ONE TEASPOONFUL BY MOUTH FOUR TIMES A DAY FOR 5 DAYS  Nuvia Pfeiffer MD   BD PEN NEEDLE JANNET U/F 32G X 4 MM MISC USE 1 PEN NEEDLE FOUR TIMES A DAY  Nuvia Pfeiffer MD      Scheduled Meds:   warfarin (COUMADIN) daily dosing (placeholder)   Other RX Placeholder    insulin glargine  15 Units Subcutaneous Daily    magnesium sulfate  2,000 mg Intravenous Once    potassium chloride  40 mEq Oral Once    potassium chloride  40 mEq Oral BID     insulin lispro  3 Units Subcutaneous TID     metoprolol succinate  25 mg Oral Daily    aspirin  81 mg Oral Daily    gabapentin  300 mg Oral Nightly    montelukast  10 mg Oral Nightly    pantoprazole  40 mg Oral QAM AC    sodium chloride flush  5-40 mL Intravenous 2 times per day    insulin lispro  0-12 Units Subcutaneous TID  in her mother. Review of Systems:  · Constitutional: Negative for fever, night sweats, chills, weight changes, or weakness  · Skin: Negative for rash, dry skin, pruritus, bruising, bleeding, blood clots, or changes in skin pigment  · HEENT: Negative for vision changes, ringing in the ears, sore throat, dysphagia, or swollen lymph nodes  · Respiratory: Positive for SOB/LEVIN  · Cardiovascular: Reviewed in HPI  · Gastrointestinal: Negative for abdominal pain, N/V/D, constipation, or black/tarry stools  · Genito-Urinary: Negative for dysuria, incontinence, urgency, or hematuria  · Musculoskeletal: Negative for joint swelling, muscle pain, or injuries  · Neurological/Psych: Negative for confusion, seizures, headaches, balance issues or TIA-like symptoms. No anxiety, depression, or insomnia    Physical Examination:  Vitals:    04/22/21 0512   BP: 107/71   Pulse: 96   Resp: 18   Temp: 98.1 °F (36.7 °C)   SpO2: 94%      In: 631.1 [P.O.:500; I.V.:131.1]  Out: 1050    Wt Readings from Last 3 Encounters:   04/21/21 209 lb 9.6 oz (95.1 kg)   04/13/21 205 lb (93 kg)   04/02/21 206 lb 9.6 oz (93.7 kg)       Intake/Output Summary (Last 24 hours) at 4/22/2021 0950  Last data filed at 4/22/2021 5178  Gross per 24 hour   Intake 1695.53 ml   Output 1225 ml   Net 470.53 ml       Telemetry: Personally Reviewed  - Atrial fibrillation, rate 80-100s   · Constitutional: Cooperative and in no apparent distress, and appears well nourished  · Skin: Warm and pink; no pallor, cyanosis, bruising, or clubbing  · HEENT: Symmetric and normocephalic. PERRL, EOM intact. Conjunctiva pink with clear sclera. Mucus membranes pink and moist. Teeth intact. Thyroid smooth without nodules or goiter. · Cardiovascular: Regular rate and irregular rhythm. S1/S2 present without rubs, or gallops. + Murmur. Peripheral pulses 2+, capillary refill < 3 seconds. No elevation of JVP. + BLE edema  · Respiratory: Respirations symmetric and unlabored.  Lungs clear to a peak velocity of 2.5m/s and a mean pressure gradient of 14mmHg. The aortic valve is thickened/calcified with decreased leaflet mobility consistent with aortic stenosis. Mild to moderate tricuspid regurgitation. Estimated pulmonary artery systolic pressure is mildly to moderately elevated at 46 mmHg assuming a right atrial pressure of 8 mmHg. ECHO: 10/20   Mildly dilated left ventricular size and normal wall thickness. Global left   ventricular function is severely decreased with ejection fraction estimated   at 25%. Patient is in atrial fibrillation during procedure. The bioprosthetic mitral valve is well seated with a mean gradient of 5 mmHg   and maximum pressure gradient of 15 mmHg with heart rate of 89 bpm. Pressure   half time of 82 ms. There is trivial mitral regurgitation. Left atrial enlargement. Spontaneous echo contrast seen in the left atrium. A large stump of the left atrial appendage with no thrombus noted. Patient   has history of surgical ligation of the left atrial appendage. There are   spontaneous echo contrast in the atrial appendage. The aortic valve is thickened/calcified with decreased leaflet mobility consistent with aortic stenosis. There is trivial aortic insufficiency. There is moderate tricuspid regurgitation    Stress Test: 8/18   Small sized lateral completely reversible defect of mild intensity    consistent with ischemia in the territory of the LCx and/or LAD .    Normal LV function.    Overall findings represent a intermediate risk scan.      LHC: 8/2018  Heavily calcified vessels  Mild AS-Normal LV FXN  90% mid LAD  90% prox Diag 1  90% mid Cx  Mild Dominant RCA     REC: CVTS opinion--Heavy Ca, DM, Chronic AC Rx---CABG vs LAD/Diag stenting-    Problem List:   Patient Active Problem List    Diagnosis Date Noted    Atrial fibrillation with rapid ventricular response (HCC)     Chest pain     Dyspnea on exertion     Acute kidney injury superimposed on CKD ([address]  Hypotension     Acute on chronic systolic heart failure due to valvular disease ([address] 02/26/2021    Stage 3 chronic kidney disease     Coronary artery disease involving native heart without angina pectoris     Deep vein thrombosis (DVT) of non-extremity vein 12/15/2020    Aortic valve stenosis 11/03/2020    Pneumatosis intestinalis of small intestine 05/10/2020    Ischemic cardiomyopathy 01/20/2020    Occlusion and stenosis of bilateral carotid arteries 01/20/2020    Persistent atrial fibrillation ([address] 10/30/2019    S/P MVR (mitral valve repair)     Thoracic degenerative disc disease 06/07/2019    Chronic systolic CHF (congestive heart failure), NYHA class 3 ([address] 01/15/2019    Obesity, Class I, BMI 30-34.9     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    Goiter 09/07/2018    S/P CABG x 3 08/22/2018    Coronary artery disease due to lipid rich plaque     Nonrheumatic mitral valve regurgitation     Encounter for loop recorder check 08/16/2018    Cardiac arrhythmia     Pneumoperitoneum 07/28/2018    PAF (paroxysmal atrial fibrillation) ([address]     Hypertension     Asthma 01/12/2018    Type 2 diabetes mellitus with hyperglycemia, with long-term current use of insulin ([address] 04/27/2017    Primary osteoarthritis of both knees 03/21/2017    Multiple pulmonary nodules 08/01/2016    History of pulmonary embolism     B12 deficiency 09/08/2014    Paroxysmal SVT (supraventricular tachycardia) ([address] 21/38/2472    Eosinophilic gastritis 80/79/4433    Generalized osteoarthrosis, involving multiple sites 03/25/2013    Long term current use of anticoagulant 12/19/2012    Hypercholesteremia 12/19/2012        Assessment and Plan:     1. Persistent Atrial Fibrillation  - Hx of WAYNE clip  - S/p DCCV x2 yesterday (failed)     - Currently in AF, rate fairly well controlled  - Continue Toprol XL 25 mg QD   - Due to significant CAD and cardiomyopathy, anti-arrhythmic therapies are limited to amiodarone.  I have discussed with patient side effects of amiodarone including but not limited to thyroid disease, discoloration of skin and cornea and pulmonary fibrosis. Patient would like to continue with it.     ~ Continue oral loading 400 mg BID x1 week, then 200 mg BID x1 week, then 200 mg daily   ~ ALT 10, AST 15, TSH 1.67 (4/15/21)    - [id number] score:high ; [id number] Vasc score and anticoagulation discussed. High risk for stroke and thromboembolism. Anticoagulation is recommended. Risk of bleeding was discussed.  ~ On coumadin; INR 3.23. Hold tonight. Discussed with pharmacy. Hold coumadin and plan to switch to heparin gtt tomorrow for possible device implant next week      - Treatment options including cardioversion, rate control strategy, antiarrhythmics, anticoagulation and possible ablation were discussed with patient. Risks, benefits and alternative of each treatment options were explained. All questions answered    ~ Will plan for repeat DCCV in a few weeks as outpatient after further amiodarone loading and improvement in her CHF    2. Acute on Chronic systolic heart failure (NYHA Class IV)  - Appears decompensated   ~ EF 10-15% per echo (11/20)  - Continue with Toprol XL 25 mg QD  ~ ACE-I/ARB contraindicated due to renal insufficiency and risk of hyperkalemia   - On lasix (increased to 10 mg/hr this AM) and primacor gtts   - Monitor I&Os, daily weights   - CHF team following    3.  ICM   - Worsening; EF now down to 10-15% (Been on OMT prior to admission)   - No plans for ischemic work up; may consider as outpatient     - Discussed with Dr. Moses Samuel; will plan for AICD placement once more stable from CHF likely early next week         ~ Decision aid tool for patient considering ICD implantation for primary prevention \"[place] Program for Patient-Centered Decision\" were used for shared decision making and a copy was given to patient for review.                  ~ Patient has a resonable expectation of survival of more than one year. Patient is a candidate for AICD implantation for primary prevention.     ~ I have discussed the procedure, risks, benefits and alternatives in detail with patient and family. I gave printed material about AICD implantation, risks and benefits. The risks including, but not limited to, the risks of bleeding, infection, pain, device malfunction, lead dislodgement, radiation exposure, injury to cardiac and surrounding structures (including pneumothorax), stroke, cardiac perforation, tamponade, need for emergent heart surgery, myocardial infarction and death were discussed in detail. The patient/spouse will discuss and let us know    4. CAD  - Hx of CABG (2018)  - Stable  - No complaints of angina  - Continue ASA, BB, and statin    5. HTN  - Controlled: Goal <130/80  - Continue current medications    6. TORIBIO on CKD   - Slightly improved    ~ Creatinin 1.2/BUN 23   - Monitor UO   - Nephrology following    7. Hypokalemia, Hypomagnesemia   - Both low this AM   - Replace to keep K+ >4 and mag >2    Multiple medical conditions with risk of decompensation. All pertinent information and plan of care discussed with the EP physician. All questions and concerns were addressed to the patient. Alternatives to my treatment were discussed. I have discussed the above stated plan with patient and the nurse. The patient verbalized understanding and agreed with the plan. Thank you for allowing to us to participate in the care of Anne Edwards.     SOLEDAD Sanderson-CNP  [address]   Office: [phone]

## 2021-04-22 NOTE — PROGRESS NOTES
[facility]   Daily Progress Note      Admit Date:  4/15/2021    HPI:    Ms. Herbert Lanes 77 y.o. female with history of CAD/CABG in 2018, PAF with WAYNE clip 8/18 and maze 2018, ablation of AF, s/p ILR, HTN, HLD, DVT/PE on coumadin, 2 CVs unsuccessful, sHF    She is admitted with palpitations and tachycardia, found to be in AF with RVR and started diltiazem gtt. probnp 8766->15K (best is around 5K)  Unsuccessful CV x2  Echo with LVEF 10-15%    Subjective:  Patient is being seen for chronic sHF. There were no acute overnight cardiac events. Weight 212.6 this morning (done by patient) Creat 1.2 potassium 3.4 she is on lasix drip at 5 mg/hr. Urine output down and still with extreme swelling in legs. Her back feels better today. She is closely recording intake and output  probnp 20061->9249    Objective:   /71   Pulse 96   Temp 98.1 °F (36.7 °C) (Oral)   Resp 18   Ht 5' 8\" (1.727 m)   Wt 209 lb 9.6 oz (95.1 kg)   SpO2 94%   BMI 31.87 kg/m²       Intake/Output Summary (Last 24 hours) at 4/22/2021 0916  Last data filed at 4/22/2021 0659  Gross per 24 hour   Intake 1695.53 ml   Output 1225 ml   Net 470.53 ml          Physical Exam:  General:  Awake, alert, oriented in NAD  Skin:  Warm and dry. No unusual bruising or rash  Neck:  Supple. + JVD or carotid bruit appreciated  Chest:  Normal effort.   Clear to auscultation, no wheezes/rhonchi/rales  Cardiovascular:  irreg, S1/S2, no murmur/gallop/rub  Abdomen:  Soft, nontender, +bowel sounds  Extremities:  Bilateral ankle to lower thigh edema  Neurological: No focal deficits  Psychological: Normal mood and affect      Medications:    warfarin (COUMADIN) daily dosing (placeholder)   Other RX Placeholder    insulin glargine  15 Units Subcutaneous Daily    magnesium sulfate  2,000 mg Intravenous Once    potassium chloride  40 mEq Oral Once    potassium chloride  40 mEq Oral BID WC    insulin lispro  3 Units Subcutaneous TID WC    metoprolol ventricular dysfunction with   anterior, lateral and apical hypokinesis with an ejection fraction of 20-25%.   There is no improvement in wall motion with low or high dose dobutamine   suggestive of nonviable myocardium.      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 After dobutamine stress the mean AV   gradient rises to 20mmHg with 20mcg of dobutamine consistent with mild to   moderate aortic stenosis.  Prosthetic mitral valve with a mean gradient of 7mmHg        JUDE 10/21/2020  Mildly dilated left ventricular size and normal wall thickness. Global left ventricular function is severely decreased with ejection fraction estimated at 25%. Patient is in atrial fibrillation during procedure.      The bioprosthetic mitral valve is well seated with a mean gradient of 5mmHg and maximum pressure gradient of 15 mmHg with heart rate of 89 bpm. Pressure half time of 82 ms. There is trivial mitral regurgitation.      Left atrial enlargement. Spontaneous echo contrast seen in the left atrium. A large stump of the left atrial appendage with no thrombus noted. Patient has history of surgical ligation of the left atrial appendage. There are spontaneous echo contrast in the atrial appendage.      The aortic valve is thickened/calcified with decreased leaflet mobility consistent with aortic stenosis. There is trivial aortic insufficiency.      There is moderate tricuspid regurgitation.     ECHO 9/15/20  Left ventricular cavity size is dilated. There is normal wall thickness with a moderately severe reduction in   systolic function.   LV ejection fraction is visually estimated to be 25-30%.   Indeterminate diastolic function.   The right ventricle is not well visualized but appears to be normal in size with moderately reduced function.   The left atrium is moderately dilated.   A bioprosthetic mitral valve with a mean gradient of 7 mmHg.  This may be a normal gradient for this valve but could suggest mild stenosis.   Trivial mitral regurgitation.   The aortic valve is thickened/calcified with a mean gradient of 16mmHg consistent with at least mild aortic stenosis. This is likely underestimated due to low cardiac output secondary to LV dysfunction.   No significant aortic valve regurgitation.   Moderate tricuspid regurgitation with RVSP of 48 mmHg. Assessment:    1. Atrial fibrillation with RVR, on coumadin  2. Chest pain  3. Dyspnea on exertion  4. Chronic systolic heart failure, on bb, no ace/arb/aldosterone antagonist due to TORIBIO  5. TORIBIO on CKD      Plan:    1. Nephrology following. Will increase lasix to 10 mg/hr  2. Daily weights  3. Continue metoprolol 25 mg daily  4. CHF nurse following for diet education, fluid restriction and daily weights  5. Continue milrinone 0.1 mcg/kg/min  6. Wrap lower legs discussed with bedside nurse and keep lower legs elevated  7. Continue low sodium diet and she will try and cut back more on her fluids  8. EP following, discussed ICD placement    Discussed with patient and  who are agreeable with plan of care. Thank you for allowing me to participate in the care of your patient.     Michael Zambrano, CNS

## 2021-04-22 NOTE — PLAN OF CARE
Blood sugar dropped during the day. While discussing insulin dosage at home. Patient reports she takes Lantus 18 units every am. Evening snack was provided.     Problem: Serum Glucose Level - Abnormal:  Goal: Ability to maintain appropriate glucose levels has stabilized  Description: Ability to maintain appropriate glucose levels has stabilized  Outcome: Ongoing

## 2021-04-22 NOTE — PROGRESS NOTES
Pharmacy to Dose Warfarin    Pharmacy consulted to dose warfarin for DVT/Afib. INR Goal: 2-3    INR today: 3.23    Assessment/Plan:  - INR supratherapeutic today  - Given large jump in INR from yesterday, will hold warfarin tonight  - A warfarin placeholder has been ordered which requires no action from nursing  - Daily INR ordered    Pharmacy will continue to follow.     Francesca Almonte, PharmD, BCPS  Clinical Pharmacist  [id number]

## 2021-04-22 NOTE — PROGRESS NOTES
HOSPITALISTS PROGRESS NOTE    4/22/2021 11:30 AM        Name: Mary Trujillo . Admitted: 4/15/2021  Primary Care Provider: Lenny Peters MD (Tel: [phone])      Problem List  Active Problems:    PAF (paroxysmal atrial fibrillation) (HCC)    Chronic systolic CHF (congestive heart failure), NYHA class 3 (HCC)    Persistent atrial fibrillation (HCC)    Atrial fibrillation with rapid ventricular response (HCC)    Chest pain    Dyspnea on exertion    Acute kidney injury superimposed on CKD ([facility] [address]  Resolved Problems:    * No resolved hospital problems. *       Assessment & Plan:   Diabetes mellitus uncontrolled with hyperglycemia  We will adjust the Lantus dose from 25 to 15 units at this time given hypoglycemia yesterday, insulin sliding scale, if blood sugar significantly gets elevated might use evening dose of Lantus    Acute on chronic systolic heart failure  Lasix and milrinone drip, nephrology and cardiology consulted, Lasix drip rate increased to 10,    Persistent atrial fibrillation  History of left atrial appendage clipping, failed cardioversion, on Coumadin and amiodarone    TORIBIO on CKD  Better creatinine, nephrology following    IV Access: Peripheral  Henry:  Diet: DIET CARDIAC; Low Sodium (2 GM); Daily Fluid Restriction: 2000 ml  Code:Prior  DVT PPX Coumadin  Disposition remains inpatient      Brief Course:    Patient admitted on 15 April with chest pain and back pain found to have A. fib with RVR along with heart failure. CC: Fluid overload    Subjective:  .   Feels better sitting in the chair  at bedside  Making good urine, blood sugar slightly on the lower side yesterday's episode with hypoglycemia noticed    Reviewed interval ancillary notes    Current Medications  warfarin (COUMADIN) daily dosing (placeholder), RX Placeholder  insulin glargine (LANTUS;BASAGLAR) injection pen 15 Units, Daily  magnesium sulfate 2000 mg in 50 mL IVPB premix, Once  spironolactone (ALDACTONE) tablet 12.5 mg, Daily  oxyCODONE (ROXICODONE) immediate release tablet 5 mg, Q4H PRN  potassium chloride (KLOR-CON M) extended release tablet 40 mEq, BID WC  furosemide (LASIX) 100 mg in dextrose 5 % 100 mL infusion, Continuous  milrinone (PRIMACOR) 20 mg in dextrose 5 % 100 mL infusion, Continuous  insulin lispro (1 Unit Dial) 3 Units, TID WC  metoprolol succinate (TOPROL XL) extended release tablet 25 mg, Daily  albuterol (PROVENTIL) nebulizer solution 2.5 mg, Q6H PRN  aspirin chewable tablet 81 mg, Daily  gabapentin (NEURONTIN) capsule 300 mg, Nightly  montelukast (SINGULAIR) tablet 10 mg, Nightly  pantoprazole (PROTONIX) tablet 40 mg, QAM AC  sodium chloride flush 0.9 % injection 5-40 mL, 2 times per day  sodium chloride flush 0.9 % injection 5-40 mL, PRN  0.9 % sodium chloride infusion, PRN  promethazine (PHENERGAN) tablet 12.5 mg, Q6H PRN    Or  ondansetron (ZOFRAN) injection 4 mg, Q6H PRN  polyethylene glycol (GLYCOLAX) packet 17 g, Daily PRN  acetaminophen (TYLENOL) tablet 650 mg, Q6H PRN    Or  acetaminophen (TYLENOL) suppository 650 mg, Q6H PRN  insulin lispro (1 Unit Dial) 0-12 Units, TID WC  insulin lispro (1 Unit Dial) 0-6 Units, Nightly  glucose (GLUTOSE) 40 % oral gel 15 g, PRN  dextrose 50 % IV solution, PRN  glucagon (rDNA) injection 1 mg, PRN  dextrose 5 % solution, PRN  amiodarone (CORDARONE) tablet 400 mg, BID        Objective:  /76   Pulse 98   Temp 98 °F (36.7 °C) (Oral)   Resp 18   Ht 5' 8\" (1.727 m)   Wt 209 lb 9.6 oz (95.1 kg)   SpO2 94%   BMI 31.87 kg/m²     Intake/Output Summary (Last 24 hours) at 4/22/2021 1130  Last data filed at 4/22/2021 0659  Gross per 24 hour   Intake 1215.53 ml   Output 1225 ml   Net -9.47 ml      Wt Readings from Last 3 Encounters:   04/21/21 209 lb 9.6 oz (95.1 kg)   04/13/21 205 lb (93 kg)   04/02/21 206 lb 9.6 oz (93.7 kg)     Obese  General appearance:  Appears comfortable  Eyes: Sclera clear. Pupils equal.  ENT: Moist oral mucosa. Trachea midline, no adenopathy. Cardiovascular: Regular rhythm, normal S1, S2. No murmur. No edema in lower extremities  Respiratory: Not using accessory muscles. Good inspiratory effort. Clear to auscultation bilaterally, no wheeze or crackles. GI: Abdomen soft, no tenderness, not distended, normal bowel sounds  Musculoskeletal: No cyanosis in digits, neck supple  Neurology: CN 2-12 grossly intact. No speech or motor deficits  Psych: Normal affect. Alert and oriented in time, place and person  Skin: Warm, dry, normal turgor  Extremity exam shows brisk capillary refill. Peripheral pulses are palpable in lower extremities     Labs and Tests:  CBC: No results for input(s): WBC, HGB, PLT in the last 72 hours. BMP:    Recent Labs     04/20/21  0428 04/21/21  0441 04/22/21  0419   * 137 137   K 3.7 3.5 3.4*    100 102   CO2 26 27 26   BUN 31* 27* 23*   CREATININE 1.7* 1.4* 1.2   GLUCOSE 129* 113* 91     Hepatic: No results for input(s): AST, ALT, ALB, BILITOT, ALKPHOS in the last 72 hours. XR CHEST PORTABLE   Final Result   No acute cardiopulmonary disease. Stable cardiomegaly with no evidence of   overt failure.              Discussed care with family  \        Manish Gonzalez MD   4/22/2021 11:30 AM

## 2021-04-23 LAB
ALBUMIN SERPL-MCNC: 2.7 G/DL (ref 3.4–5)
ANION GAP SERPL CALCULATED.3IONS-SCNC: 9 MMOL/L (ref 3–16)
BUN BLDV-MCNC: 24 MG/DL (ref 7–20)
CALCIUM SERPL-MCNC: 8.3 MG/DL (ref 8.3–10.6)
CHLORIDE BLD-SCNC: 100 MMOL/L (ref 99–110)
CO2: 27 MMOL/L (ref 21–32)
CREAT SERPL-MCNC: 1.6 MG/DL (ref 0.6–1.2)
GFR AFRICAN AMERICAN: 38
GFR NON-AFRICAN AMERICAN: 31
GLUCOSE BLD-MCNC: 102 MG/DL (ref 70–99)
GLUCOSE BLD-MCNC: 119 MG/DL (ref 70–99)
GLUCOSE BLD-MCNC: 167 MG/DL (ref 70–99)
GLUCOSE BLD-MCNC: 170 MG/DL (ref 70–99)
GLUCOSE BLD-MCNC: 60 MG/DL (ref 70–99)
GLUCOSE BLD-MCNC: 97 MG/DL (ref 70–99)
INR BLD: 3.44 (ref 0.86–1.14)
MAGNESIUM: 2 MG/DL (ref 1.8–2.4)
PERFORMED ON: ABNORMAL
PERFORMED ON: NORMAL
PHOSPHORUS: 2.9 MG/DL (ref 2.5–4.9)
POTASSIUM SERPL-SCNC: 3.5 MMOL/L (ref 3.5–5.1)
PROTHROMBIN TIME: 40.4 SEC (ref 10–13.2)
SODIUM BLD-SCNC: 136 MMOL/L (ref 136–145)

## 2021-04-23 PROCEDURE — 36415 COLL VENOUS BLD VENIPUNCTURE: CPT

## 2021-04-23 PROCEDURE — 6370000000 HC RX 637 (ALT 250 FOR IP): Performed by: INTERNAL MEDICINE

## 2021-04-23 PROCEDURE — 83735 ASSAY OF MAGNESIUM: CPT

## 2021-04-23 PROCEDURE — 99232 SBSQ HOSP IP/OBS MODERATE 35: CPT | Performed by: CLINICAL NURSE SPECIALIST

## 2021-04-23 PROCEDURE — 2060000000 HC ICU INTERMEDIATE R&B

## 2021-04-23 PROCEDURE — 6370000000 HC RX 637 (ALT 250 FOR IP): Performed by: NURSE PRACTITIONER

## 2021-04-23 PROCEDURE — 6360000002 HC RX W HCPCS: Performed by: CLINICAL NURSE SPECIALIST

## 2021-04-23 PROCEDURE — 80069 RENAL FUNCTION PANEL: CPT

## 2021-04-23 PROCEDURE — 2580000003 HC RX 258: Performed by: CLINICAL NURSE SPECIALIST

## 2021-04-23 PROCEDURE — 85610 PROTHROMBIN TIME: CPT

## 2021-04-23 PROCEDURE — 99233 SBSQ HOSP IP/OBS HIGH 50: CPT | Performed by: NURSE PRACTITIONER

## 2021-04-23 RX ORDER — SODIUM CHLORIDE 9 MG/ML
25 INJECTION, SOLUTION INTRAVENOUS PRN
Status: DISCONTINUED | OUTPATIENT
Start: 2021-04-23 | End: 2021-05-03

## 2021-04-23 RX ORDER — SODIUM CHLORIDE 0.9 % (FLUSH) 0.9 %
5-40 SYRINGE (ML) INJECTION PRN
Status: DISCONTINUED | OUTPATIENT
Start: 2021-04-23 | End: 2021-05-05 | Stop reason: HOSPADM

## 2021-04-23 RX ORDER — SODIUM CHLORIDE 0.9 % (FLUSH) 0.9 %
5-40 SYRINGE (ML) INJECTION EVERY 12 HOURS SCHEDULED
Status: DISCONTINUED | OUTPATIENT
Start: 2021-04-23 | End: 2021-04-29 | Stop reason: SDUPTHER

## 2021-04-23 RX ORDER — LIDOCAINE HYDROCHLORIDE 10 MG/ML
5 INJECTION, SOLUTION EPIDURAL; INFILTRATION; INTRACAUDAL; PERINEURAL ONCE
Status: DISCONTINUED | OUTPATIENT
Start: 2021-04-23 | End: 2021-05-03

## 2021-04-23 RX ORDER — OXYCODONE AND ACETAMINOPHEN 10; 325 MG/1; MG/1
1 TABLET ORAL EVERY 6 HOURS PRN
Status: DISCONTINUED | OUTPATIENT
Start: 2021-04-23 | End: 2021-05-05 | Stop reason: HOSPADM

## 2021-04-23 RX ADMIN — POTASSIUM CHLORIDE 40 MEQ: 1500 TABLET, EXTENDED RELEASE ORAL at 17:07

## 2021-04-23 RX ADMIN — OXYCODONE 5 MG: 5 TABLET ORAL at 09:50

## 2021-04-23 RX ADMIN — ASPIRIN 81 MG: 81 TABLET, CHEWABLE ORAL at 08:53

## 2021-04-23 RX ADMIN — OXYCODONE AND ACETAMINOPHEN 1 TABLET: 325; 10 TABLET ORAL at 18:43

## 2021-04-23 RX ADMIN — MONTELUKAST SODIUM 10 MG: 10 TABLET, FILM COATED ORAL at 21:06

## 2021-04-23 RX ADMIN — FUROSEMIDE 10 MG/HR: 10 INJECTION, SOLUTION INTRAMUSCULAR; INTRAVENOUS at 03:54

## 2021-04-23 RX ADMIN — SPIRONOLACTONE 12.5 MG: 25 TABLET ORAL at 08:53

## 2021-04-23 RX ADMIN — METOPROLOL SUCCINATE 25 MG: 25 TABLET, EXTENDED RELEASE ORAL at 08:53

## 2021-04-23 RX ADMIN — OXYCODONE 5 MG: 5 TABLET ORAL at 15:03

## 2021-04-23 RX ADMIN — OXYCODONE 5 MG: 5 TABLET ORAL at 21:06

## 2021-04-23 RX ADMIN — PANTOPRAZOLE SODIUM 40 MG: 40 TABLET, DELAYED RELEASE ORAL at 05:37

## 2021-04-23 RX ADMIN — FUROSEMIDE 10 MG/HR: 10 INJECTION, SOLUTION INTRAMUSCULAR; INTRAVENOUS at 16:13

## 2021-04-23 RX ADMIN — MILRINONE LACTATE 0.1 MCG/KG/MIN: 200 INJECTION, SOLUTION INTRAVENOUS at 15:30

## 2021-04-23 RX ADMIN — INSULIN LISPRO 3 UNITS: 100 INJECTION, SOLUTION INTRAVENOUS; SUBCUTANEOUS at 17:08

## 2021-04-23 RX ADMIN — POTASSIUM CHLORIDE 40 MEQ: 1500 TABLET, EXTENDED RELEASE ORAL at 08:53

## 2021-04-23 RX ADMIN — OXYCODONE 5 MG: 5 TABLET ORAL at 05:37

## 2021-04-23 RX ADMIN — INSULIN LISPRO 3 UNITS: 100 INJECTION, SOLUTION INTRAVENOUS; SUBCUTANEOUS at 12:34

## 2021-04-23 RX ADMIN — AMIODARONE HYDROCHLORIDE 400 MG: 200 TABLET ORAL at 08:53

## 2021-04-23 RX ADMIN — OXYCODONE AND ACETAMINOPHEN 1 TABLET: 325; 10 TABLET ORAL at 12:44

## 2021-04-23 RX ADMIN — INSULIN LISPRO 3 UNITS: 100 INJECTION, SOLUTION INTRAVENOUS; SUBCUTANEOUS at 09:03

## 2021-04-23 RX ADMIN — INSULIN LISPRO 2 UNITS: 100 INJECTION, SOLUTION INTRAVENOUS; SUBCUTANEOUS at 09:03

## 2021-04-23 RX ADMIN — AMIODARONE HYDROCHLORIDE 400 MG: 200 TABLET ORAL at 21:06

## 2021-04-23 ASSESSMENT — PAIN SCALES - GENERAL
PAINLEVEL_OUTOF10: 6
PAINLEVEL_OUTOF10: 8
PAINLEVEL_OUTOF10: 6
PAINLEVEL_OUTOF10: 6
PAINLEVEL_OUTOF10: 7

## 2021-04-23 ASSESSMENT — PAIN - FUNCTIONAL ASSESSMENT: PAIN_FUNCTIONAL_ASSESSMENT: PREVENTS OR INTERFERES SOME ACTIVE ACTIVITIES AND ADLS

## 2021-04-23 ASSESSMENT — PAIN DESCRIPTION - PAIN TYPE: TYPE: CHRONIC PAIN

## 2021-04-23 ASSESSMENT — PAIN DESCRIPTION - ORIENTATION: ORIENTATION: LOWER

## 2021-04-23 ASSESSMENT — PAIN DESCRIPTION - LOCATION: LOCATION: BACK

## 2021-04-23 ASSESSMENT — PAIN DESCRIPTION - PROGRESSION
CLINICAL_PROGRESSION: NOT CHANGED

## 2021-04-23 ASSESSMENT — PAIN DESCRIPTION - ONSET
ONSET: ON-GOING
ONSET: ON-GOING

## 2021-04-23 ASSESSMENT — PAIN DESCRIPTION - DESCRIPTORS
DESCRIPTORS: ACHING
DESCRIPTORS: ACHING

## 2021-04-23 NOTE — PROGRESS NOTES
[address]   Electrophysiology Progress Note     Date: 4/23/2021  Admit Date: 4/15/2021     Reason for consultation: Atrial fibrillation    Chief Complaint:   Chief Complaint   Patient presents with    Chest Pain    Back Pain       History of Present Illness: History obtained from patient and medical record. Dillon Ibrahim is a 77 y.o. female with a past medical history of HTN, HLD, DM, CAD s/p CABG x 3, S/p bioprosthetic MVR, WAYNE clip (8/2018), afib s/p Maze 2018, CMP (EF25-30%) DVT/PE on warfarin and sCHF follows with HF. S/p ablation of afib w/ PVI, roofline, posterior, CTI flutter 10/30/2019. S/p ILR. Hx of Mobitz I AVB and prolonged MI interval. S/p JUDE/CV. Has been treated with amiodarone stopped in 2018.      Presented with palpitations and tachycardia, found to be in afib/RVR and started on diltiazem gtt. Reportedly her carvedilol was reduced recently. Interval Hx: Today, she is being seen for follow up. She converted to sinus rhythm. She feels about the same as yesterday and her weight is up 1 lb. She is having modest diuresis on lasix gtt. We discussed AICD placement on Monday. Patient seen and examined. Clinical notes reviewed. Telemetry reviewed. No new complaints today. No major events overnight. Denies having chest pain, palpitations, shortness of breath, orthopnea/PND, cough, or dizziness at the time of this visit. Allergies: Allergies   Allergen Reactions    Tozdhkry-Mlmdpjd-Ujxruo [Fluocinolone] Shortness Of Breath    Ciprofloxacin Shortness Of Breath    Diovan [Valsartan] Shortness Of Breath    Flagyl [Metronidazole] Shortness Of Breath     Has taken diflucan at home 12/7/15    Metformin And Related [Metformin And Related] Shortness Of Breath    Benazepril      Other reaction(s): Not Recorded    Morphine      Bad reaction. \"makes her feel horrible\".      Saxagliptin      Other reaction(s): Not Recorded    Levaquin [Levofloxacin] Rash     Home Meds:  Prior to Visit Medications    Medication Sig Taking? Authorizing Provider   spironolactone (ALDACTONE) 25 MG tablet Take 2 tablets by mouth daily Yes Violeta Salinas APRN - CNS   amoxicillin-clavulanate (AUGMENTIN) 875-125 MG per tablet Take 1 tablet by mouth 2 times daily for 10 days Yes Lynwood Nissen, MD   torsemide (DEMADEX) 20 MG tablet 40mg morning, 40mg afternoon, 20mg evening Yes Nayla Kendrick MD   carvedilol (COREG) 6.25 MG tablet Take 1 tablet by mouth daily Yes Nayla Kendrick MD   gabapentin (NEURONTIN) 300 MG capsule Take 1 capsule by mouth nightly for 90 days. Intended supply: 30 days Yes Margy Hinojosa MD   insulin glargine (LANTUS SOLOSTAR) 100 UNIT/ML injection pen INJECT 26 UNITS IN THE MORNING AND 18 UNITS AT BEDTIME Yes Rohit Clay MD   exenatide (BYETTA 10 MCG PEN) 10 MCG/0.04ML injection Inject 0.04 mLs into the skin 2 times daily (with meals) Yes Margy Hinojosa MD   OXYCODONE HCL PO Take 10 mg by mouth As of 1/21/2021,  states patient is taking 10mg with edge being taken off her pain after 3 hours.  Yes Historical Provider, MD   ACETAMINOPHEN PO Take 500 mg by mouth every 6 hours as needed  Yes Historical Provider, MD   albuterol sulfate  (90 Base) MCG/ACT inhaler USE 2 INHALATIONS EVERY 6 HOURS AS NEEDED FOR WHEEZING Yes Margy Hinojosa MD   albuterol (PROVENTIL) (2.5 MG/3ML) 0.083% nebulizer solution Take 3 mLs by nebulization every 6 hours as needed for Wheezing Yes Margy Hinojosa MD   polyethylene glycol (GLYCOLAX) 17 GM/SCOOP powder Take 17 g by mouth every other day  Yes Historical Provider, MD   omeprazole (PRILOSEC) 20 MG delayed release capsule Take 20 mg by mouth daily Yes Historical Provider, MD   ondansetron (ZOFRAN) 4 MG tablet Take 1 tablet by mouth 3 times daily as needed for Nausea or Vomiting Yes Margy Hinojosa MD   aspirin 81 MG chewable tablet Take 1 tablet by mouth daily Yes Margy Hinojosa MD   vitamin B-12 500 MCG tablet Take 1 tablet by mouth daily Yes Isis Garnett MD   potassium chloride (KLOR-CON M) 10 MEQ extended release tablet TAKE 1 TABLET DAILY  Rosy Enamorado MD   predniSONE (DELTASONE) 10 MG tablet TAKE 1 TABLET AS NEEDED (EOSINOPHILIC GASATRITIS)  Rosy Enamorado MD   montelukast (SINGULAIR) 10 MG tablet TAKE 1 TABLET NIGHTLY  Rosy Enamorado MD   warfarin (COUMADIN) 5 MG tablet TAKE 1 TABLET DAILY  Rosy Enamorado MD   blood glucose test strips (FREESTYLE LITE) strip USE TO TEST FOUR TIMES A DAY  Rosy Enamorado MD   FreeStyle Lancets MISC 1 each by Does not apply route 4 times daily  Rosy Enamorado MD   Blood Glucose Monitoring Suppl ("[employer]ACE PRO GLUCOSE METER) GAETANO 1 Device by Does not apply route 4 times daily  Rosy Enamorado MD   HUMALOG KWIKPEN 100 UNIT/ML SOPN TAKE AS INSTRUCTED BY YOUR PRESCRIBER  Patient taking differently:  Only if high blood sugar-has not been using  Rosy Enamorado MD   nystatin (MYCOSTATIN) 242342 UNIT/ML suspension TAKE ONE TEASPOONFUL BY MOUTH FOUR TIMES A DAY FOR 5 DAYS  Rosy Enamorado MD   BD PEN NEEDLE JANNET U/F 32G X 4 MM MISC USE 1 PEN NEEDLE FOUR TIMES A DAY  Rosy Enamorado MD      Scheduled Meds:   lidocaine 1 % injection  5 mL Intradermal Once    sodium chloride flush  5-40 mL Intravenous 2 times per day    warfarin (COUMADIN) daily dosing (placeholder)   Other RX Placeholder    insulin glargine  15 Units Subcutaneous Daily    spironolactone  12.5 mg Oral Daily    potassium chloride  40 mEq Oral BID WC    insulin lispro  3 Units Subcutaneous TID WC    metoprolol succinate  25 mg Oral Daily    aspirin  81 mg Oral Daily    gabapentin  300 mg Oral Nightly    montelukast  10 mg Oral Nightly    pantoprazole  40 mg Oral QAM AC    sodium chloride flush  5-40 mL Intravenous 2 times per day    insulin lispro  0-12 Units Subcutaneous TID WC    insulin lispro  0-6 Units Subcutaneous Nightly    amiodarone  400 mg Oral BID     Continuous Infusions:   sodium chloride      furosemide (LASIX) 1mg/ml infusion 10 mg/hr (21 1255)    milrinone 0.098 mcg/kg/min (21 1255)    sodium chloride      dextrose       PRN Meds:oxyCODONE-acetaminophen, sodium chloride flush, sodium chloride, oxyCODONE, albuterol, sodium chloride flush, sodium chloride, promethazine **OR** ondansetron, polyethylene glycol, acetaminophen **OR** acetaminophen, glucose, dextrose, glucagon (rDNA), dextrose     Past Medical History:  Past Medical History:   Diagnosis Date    Asthma     Atrial fibrillation (HCC)     Eosinophilia     Hemoptysis     HIGH CHOLESTEROL     Hx of blood clots     Hypertension     Irregular heart beat     Other specified gastritis without mention of hemorrhage     Palpitations     Skin cancer     basal and squamous    Type II or unspecified type diabetes mellitus without mention of complication, not stated as uncontrolled       Past Surgical History:    has a past surgical history that includes Cholecystectomy;  section; Colonoscopy (2017); skin biopsy; bronchoscopy (2016); Coronary artery bypass graft (2018); Mitral valve replacement (2018); transesophageal echocardiogram (2018); Tunneled venous catheter placement (Left, 2018); Cardiac catheterization (2018); Insertable Cardiac Monitor (Left, 2018); Colonoscopy (2014); Hysterectomy; Upper gastrointestinal endoscopy (N/A, 10/10/2018); Colonoscopy (10/10/2018); Colonoscopy (N/A, 2020); Pain management procedure (N/A, 2020); and Pain management procedure (Bilateral, 2021). Social History:  Reviewed. reports that she has never smoked. She has never used smokeless tobacco. She reports that she does not drink alcohol or use drugs. Family History:  Reviewed.  family history includes Asthma in her mother; Cancer in her father; Heart Disease in her mother; High Blood Pressure in her mother; Hypertension in her rhonchi  · Gastrointestinal: Abdomen soft and round. Bowel sounds normoactive in all quadrants without tenderness or masses. + obese  · Musculoskeletal: Bilateral upper and lower extremity strength 5/5 with full ROM  · Neurologic/Psych: Awake and orientated to person, place and time. Calm affect, appropriate mood    Pertinent labs, diagnostic, device, and imaging results reviewed as a part of this visit    Labs:    BMP:   Recent Labs     21  0441 21  0419 21  0444    137 136   K 3.5 3.4* 3.5    102 100   CO2 27 26 27   PHOS  --  3.4 2.9   BUN 27* 23* 24*   CREATININE 1.4* 1.2 1.6*   MG 2.00 1.70* 2.00     Estimated Creatinine Clearance: 37 mL/min (A) (based on SCr of 1.6 mg/dL (H)). CBC: No results for input(s): WBC, HGB, HCT, MCV, PLT in the last 72 hours. Thyroid:   Lab Results   Component Value Date    TSH 1.90 2021     Lipids:   Lab Results   Component Value Date    CHOL 149 2020    HDL 40 2020    TRIG 94 2021     LFTS:   Lab Results   Component Value Date    ALT 10 04/15/2021    AST 15 04/15/2021    ALKPHOS 215 04/15/2021    PROT 6.6 04/15/2021    AGRATIO 0.8 04/15/2021    BILITOT 0.6 04/15/2021     Cardiac Enzymes:   Lab Results   Component Value Date    TROPONINI 0.02 04/15/2021    TROPONINI 0.02 04/15/2021    TROPONINI <0.01 05/10/2020     Coags:   Lab Results   Component Value Date    PROTIME 40.4 2021    INR 3.44 2021       EC/15/21  Atrial fibrillation    Echo: 21   Overall left ventricular systolic function is severely depressed . Ejection fraction is visually estimated to be 10-15 %. E/e'= 23.2 GLS=-3.4   Moderately dilated left ventricle. There is severe diffuse hypokinesis. Indeterminate diastolic function. A bioprosthetic mitral valve is well seated with peak velocity of 1.59m/s and a mean gradient of 3mmHg. The left atrium is moderately dilated.    Mild aortic stenosis with a peak velocity of 2.5m/s and a mean pressure gradient of 14mmHg. The aortic valve is thickened/calcified with decreased leaflet mobility consistent with aortic stenosis. Mild to moderate tricuspid regurgitation. Estimated pulmonary artery systolic pressure is mildly to moderately elevated at 46 mmHg assuming a right atrial pressure of 8 mmHg. ECHO: 10/20   Mildly dilated left ventricular size and normal wall thickness. Global left   ventricular function is severely decreased with ejection fraction estimated   at 25%. Patient is in atrial fibrillation during procedure. The bioprosthetic mitral valve is well seated with a mean gradient of 5 mmHg   and maximum pressure gradient of 15 mmHg with heart rate of 89 bpm. Pressure   half time of 82 ms. There is trivial mitral regurgitation. Left atrial enlargement. Spontaneous echo contrast seen in the left atrium. A large stump of the left atrial appendage with no thrombus noted. Patient   has history of surgical ligation of the left atrial appendage. There are   spontaneous echo contrast in the atrial appendage. The aortic valve is thickened/calcified with decreased leaflet mobility consistent with aortic stenosis. There is trivial aortic insufficiency. There is moderate tricuspid regurgitation    Stress Test: 8/18   Small sized lateral completely reversible defect of mild intensity    consistent with ischemia in the territory of the LCx and/or LAD .    Normal LV function.    Overall findings represent a intermediate risk scan.      [facility]: 8/2018  Heavily calcified vessels  Mild AS-Normal LV FXN  90% mid LAD  90% prox Diag 1  90% mid Cx  Mild Dominant RCA     REC: CVTS opinion--Heavy Ca, DM, Chronic AC Rx---CABG vs LAD/Diag stenting-    Problem List:   Patient Active Problem List    Diagnosis Date Noted    Atrial fibrillation with rapid ventricular response (HCC)     Chest pain     Dyspnea on exertion     Acute kidney injury superimposed on CKD (HCC)     Hypotension     Acute on chronic systolic heart failure due to valvular disease ([facility] [address] 02/26/2021    Stage 3 chronic kidney disease     Coronary artery disease involving native heart without angina pectoris     Deep vein thrombosis (DVT) of non-extremity vein 12/15/2020    Aortic valve stenosis 11/03/2020    Pneumatosis intestinalis of small intestine 05/10/2020    Ischemic cardiomyopathy 01/20/2020    Occlusion and stenosis of bilateral carotid arteries 01/20/2020    Persistent atrial fibrillation ([address] 10/30/2019    S/P MVR (mitral valve repair)     Thoracic degenerative disc disease 06/07/2019    Chronic systolic CHF (congestive heart failure), NYHA class 3 ([address] 01/15/2019    Obesity, Class I, BMI 30-34.9     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 pulmonary nodules 08/01/2016    History of pulmonary embolism     B12 deficiency 09/08/2014    Paroxysmal SVT (supraventricular tachycardia) ([address] 22/39/2116    Eosinophilic gastritis 20/24/4200    Generalized osteoarthrosis, involving multiple sites 03/25/2013    Long term current use of anticoagulant 12/19/2012    Hypercholesteremia 12/19/2012        Assessment and Plan:     1. Persistent Atrial Fibrillation  - Hx of WAYNE clip  - S/p DCCV x2 yesterday (failed)     - Currently in NSR  - Continue Toprol XL 25 mg QD   - Due to significant CAD and cardiomyopathy, anti-arrhythmic therapies are limited to amiodarone.  I have discussed with patient side effects of amiodarone including but not limited to thyroid disease, discoloration of skin and cornea and pulmonary fibrosis. Patient would like to continue with it.     ~ Continue oral loading reduce to 200 mg BID x1 week, then 200 mg daily   ~ ALT 10, AST 15, TSH 1.67 (4/15/21)    - [id number] score:high ; [id number] Vasc score and anticoagulation discussed. High risk for stroke and thromboembolism. Anticoagulation is recommended. Risk of bleeding was discussed.  ~ INR 3.44. Continue to hold coumadin. Discussed with pharmacy. Will plan to switch to heparin once INR <3 for possible device implant next week. Will resume coumadin following device placement      - Treatment options including cardioversion, rate control strategy, antiarrhythmics, anticoagulation and possible ablation were discussed with patient. Risks, benefits and alternative of each treatment options were explained. All questions answered    2. Acute on Chronic systolic heart failure (NYHA Class IV)  - Appears decompensated, but improving   ~ EF 10-15% per echo (11/20)  - Continue with Toprol XL 25 mg QD  ~ ACE-I/ARB contraindicated due to renal insufficiency and risk of hyperkalemia   - On lasix and primacor gtts   - Monitor I&Os, daily weights   - CHF team following    3. ICM   - Worsening; EF now down to 10-15% (Been on OMT prior to admission)   - No plans for ischemic work up; may consider as outpatient     - Discussed with Dr. Ronak Rivera; will plan for AICD placement once more stable from CHF likely early next week. Keep NPO on Sunday night for possible placement on Monday         ~ Decision aid tool for patient considering ICD implantation for primary prevention \"[place] Program for Patient-Centered Decision\" were used for shared decision making and a copy was given to patient for review.                  ~ Patient has a resonable expectation of survival of more than one year.  Patient is a candidate for AICD implantation for primary prevention.     ~ I have discussed the procedure, risks, benefits and alternatives in detail with patient and family. I gave printed material about AICD implantation, risks and benefits. The risks including, but not limited to, the risks of bleeding, infection, pain, device malfunction, lead dislodgement, radiation exposure, injury to cardiac and surrounding structures (including pneumothorax), stroke, cardiac perforation, tamponade, need for emergent heart surgery, myocardial infarction and death were discussed in detail. The patient/spouse will discuss and let us know    4. CAD  - Hx of CABG (2018)  - Stable  - No complaints of angina  - Continue ASA, BB, and statin    5. HTN  - Controlled: Goal <130/80  - Continue current medications    6. TORIBIO on CKD   - Elevated    ~ Creatinin 1.6/BUN 24   - Monitor UO   - Nephrology following    7. Hypokalemia, Hypomagnesemia   - Stable   - Replace to keep K+ >4 and mag >2    Multiple medical conditions with risk of decompensation. All pertinent information and plan of care discussed with the EP physician. All questions and concerns were addressed to the patient. Alternatives to my treatment were discussed. I have discussed the above stated plan with patient and the nurse. The patient verbalized understanding and agreed with the plan. Thank you for allowing to us to participate in the care of Orlin Bailey     The patient was seen for >35 minutes. I reviewed interval history, physical exam, review of data including labs, imaging, development and implementation of treatment plan and coordination of complex care.     SOLEDAD Kellogg-CNP  [facility]   Office: [phone]

## 2021-04-23 NOTE — PROGRESS NOTES
Nephrology Attending  Progress Note        SUMMARY :  We are following this patient for TORIBIO on CKD stage 3   77 y.o. female who yesterday night started to have midsternal chest pain that was 7 out of 10 pressure-like associated with palpitations heart rate was up to 140s. Was short of breath no diaphoresis some nausea no vomiting. Patient came to the ED was in A. fib with RVR. SUBJECTIVE:   Patient progress reviewed. The patient says her dyspnea is better , No dysuria    in the room     Physical Exam:    VITALS:  /74   Pulse 84   Temp 97.5 °F (36.4 °C) (Oral)   Resp 18   Ht 5' 8\" (1.727 m)   Wt 213 lb (96.6 kg)   SpO2 95%   BMI 32.39 kg/m²   BLOOD PRESSURE RANGE:  Systolic (66RZJ), HDC:569 , Min:103 , EXV:559   ; Diastolic (26TUG), SBX:14, Min:65, Max:88    24HR INTAKE/OUTPUT:      Intake/Output Summary (Last 24 hours) at 4/23/2021 1142  Last data filed at 4/23/2021 1059  Gross per 24 hour   Intake 876.62 ml   Output 2740 ml   Net -1863.38 ml       Gen:  alert, oriented x 3  PERRL , EOM +  Pallor +, No icterus  JVP not raised   CV: RRR no murmur or rub .   Lungs:B/ L air entry, Normal breath sounds , B/ L crackles   Abd: soft, bowel sounds + , No organomegaly   Ext: No edema, no cyanosis  No  Rash   Neuro: nonfocal.      DATA:    CBC with Differential:    Lab Results   Component Value Date    WBC 4.0 04/16/2021    RBC 3.68 04/16/2021    HGB 10.0 04/16/2021    HCT 31.6 04/16/2021     04/16/2021    MCV 85.8 04/16/2021    MCH 27.1 04/16/2021    MCHC 31.6 04/16/2021    RDW 16.8 04/16/2021    SEGSPCT 64.1 09/02/2020    BANDSPCT 1 01/14/2019    LYMPHOPCT 15.1 04/16/2021    MONOPCT 14.7 04/16/2021    BASOPCT 0.8 04/16/2021    MONOSABS 0.6 04/16/2021    LYMPHSABS 0.6 04/16/2021    EOSABS 0.4 04/16/2021    BASOSABS 0.0 04/16/2021     CMP:    Lab Results   Component Value Date     04/23/2021    K 3.5 04/23/2021    K 3.5 04/21/2021     04/23/2021    CO2 27 04/23/2021    BUN 24 04/23/2021    CREATININE 1.6 04/23/2021    GFRAA 38 04/23/2021    AGRATIO 0.8 04/15/2021    LABGLOM 31 04/23/2021    LABGLOM 45 12/06/2018    GLUCOSE 170 04/23/2021    PROT 6.6 04/15/2021    LABALBU 2.7 04/23/2021    CALCIUM 8.3 04/23/2021    BILITOT 0.6 04/15/2021    ALKPHOS 215 04/15/2021    AST 15 04/15/2021    ALT 10 04/15/2021     Magnesium:    Lab Results   Component Value Date    MG 2.00 04/23/2021     Phosphorus:    Lab Results   Component Value Date    PHOS 2.9 04/23/2021     Troponin:    Lab Results   Component Value Date    TROPONINI 0.02 04/15/2021     U/A:    Lab Results   Component Value Date    COLORU Yellow 04/15/2021    PROTEINU Negative 04/15/2021    PHUR 6.5 04/15/2021    WBCUA 3 04/15/2021    RBCUA 2 04/15/2021    YEAST neg 02/12/2021    BACTERIA trace 02/12/2021    BACTERIA 1+ 05/11/2020    CLARITYU Clear 04/15/2021    SPECGRAV 1.020 04/15/2021    LEUKOCYTESUR Negative 04/15/2021    UROBILINOGEN 0.2 04/15/2021    BILIRUBINUR Negative 04/15/2021    BLOODU TRACE-INTACT 04/15/2021    GLUCOSEU Negative 04/15/2021         IMPRESSION/RECOMMENDATIONS:      Diagnosis and Plan     1. TORIBIO   due to renal hypoperfusion secondary to hypotension as well as atrial fibrillation and diuretic use  Cr 1.6    2. CKD stage 3   Presumptive  diabetic nephropathy, however no proteinuria    Baseline  creatinine 1.1    3. HTN     4. Afib with RVR    5.  sCHF    acute on chronic exacerbation, recent EF 10 to 15%   on milrinone and furosemide            Candi Home

## 2021-04-23 NOTE — PLAN OF CARE
Problem: Pain:  Goal: Control of acute pain  Description: Control of acute pain  Outcome: Ongoing     Problem: Cardiovascular  Goal: No DVT, peripheral vascular complications  Outcome: Ongoing     Problem: Cardiovascular  Goal: Weight maintained or lost  Outcome: Ongoing     Problem: Respiratory  Goal: O2 Sat > 90%  Outcome: Ongoing   Pt has been alternating between percocet 10mg and oxycodone 5mg, feet elevated and wrapped. Continue lasix and daily weights. O2 above 90 on RA. Bed alarm on, bed in lowest position, call light within reach.

## 2021-04-23 NOTE — PROGRESS NOTES
HOSPITALISTS PROGRESS NOTE    4/23/2021 11:37 AM        Name: Shelton Marr . Admitted: 4/15/2021  Primary Care Provider: Michelle Blancas MD (Tel: [phone])      Problem List  Active Problems:    PAF (paroxysmal atrial fibrillation) (HCC)    Chronic systolic CHF (congestive heart failure), NYHA class 3 (HCC)    Persistent atrial fibrillation (HCC)    Atrial fibrillation with rapid ventricular response (HCC)    Chest pain    Dyspnea on exertion    Acute kidney injury superimposed on CKD ([facility] [address]  Resolved Problems:    * No resolved hospital problems. *       Assessment & Plan:   Diabetes mellitus uncontrolled with hyperglycemia  Did cut down the dose of Lantus from 25 to 15 units on 1422, continue with the current dose at this time blood sugars are in good control    Acute on chronic systolic heart failure  Lasix and milrinone drip, nephrology and cardiology consulted, Lasix drip rate increased to 10,    Persistent atrial fibrillation  History of left atrial appendage clipping, failed cardioversion, on Coumadin and amiodarone    TORIBIO on CKD  Better creatinine, nephrology following  Worsening renal function, adjustment of diuretic drip as per nephrology and cardiology    IV Access: Peripheral  Henry:  Diet: DIET CARDIAC; Low Sodium (2 GM); Daily Fluid Restriction: 2000 ml  Code:Prior  DVT PPX Coumadin  Disposition remains inpatient      Brief Course:    Patient admitted on 15 April with chest pain and back pain found to have A. fib with RVR along with heart failure. CC: Fluid overload    Subjective: Yuan Neri   Blood sugars improved, renal function slightly worse, not much diuresis, complains of back pain  Reviewed interval ancillary notes    Current Medications  oxyCODONE-acetaminophen (PERCOCET)  MG per tablet 1 tablet, Q6H PRN  lidocaine PF 1 % injection 5 mL, Once  sodium chloride flush 0.9 % injection 5-40 mL, 2 times per from Last 3 Encounters:   04/23/21 213 lb (96.6 kg)   04/13/21 205 lb (93 kg)   04/02/21 206 lb 9.6 oz (93.7 kg)     Obese  General appearance:  Appears comfortable  Eyes: Sclera clear. Pupils equal.  ENT: Moist oral mucosa. Trachea midline, no adenopathy. Cardiovascular: Regular rhythm, normal S1, S2. No murmur. No edema in lower extremities  Respiratory: Not using accessory muscles. Good inspiratory effort. Clear to auscultation bilaterally, no wheeze or crackles. GI: Abdomen soft, no tenderness, not distended, normal bowel sounds  Musculoskeletal: No cyanosis in digits, neck supple  Neurology: CN 2-12 grossly intact. No speech or motor deficits  Psych: Normal affect. Alert and oriented in time, place and person  Skin: Warm, dry, normal turgor  Extremity exam shows brisk capillary refill. Peripheral pulses are palpable in lower extremities     Labs and Tests:  CBC: No results for input(s): WBC, HGB, PLT in the last 72 hours. BMP:    Recent Labs     04/21/21  0441 04/22/21  0419 04/23/21  0444    137 136   K 3.5 3.4* 3.5    102 100   CO2 27 26 27   BUN 27* 23* 24*   CREATININE 1.4* 1.2 1.6*   GLUCOSE 113* 91 170*     Hepatic: No results for input(s): AST, ALT, ALB, BILITOT, ALKPHOS in the last 72 hours. XR CHEST PORTABLE   Final Result   No acute cardiopulmonary disease. Stable cardiomegaly with no evidence of   overt failure.              Discussed care with family  \        Madeline Jesus MD   4/23/2021 11:37 AM

## 2021-04-23 NOTE — PROGRESS NOTES
Pharmacy to Dose Warfarin    Pharmacy consulted to dose warfarin for Afib. INR Goal: 2-3    INR today: 3.44    Assessment/Plan:  - INR supratherapeutic today  - INR trending up despite holding warfarin last night. - Will continue to hold warfarin. EP with plans for possible AICD so considering starting heparin drip once INR subtherapeutic  - Daily INR ordered    Pharmacy will continue to follow.     Deborah Curtis, PharmD, BCPS  Clinical Pharmacist  [id number]

## 2021-04-23 NOTE — PROGRESS NOTES
[address]   Daily Progress Note      Admit Date:  4/15/2021    HPI:    Ms. Baldemar Lanes 77 y.o. female with history of CAD/CABG in 2018, PAF with WAYNE clip 8/18 and maze 2018, ablation of AF, s/p ILR, HTN, HLD, DVT/PE on coumadin, 2 CVs unsuccessful, sHF    She is admitted with palpitations and tachycardia, found to be in AF with RVR and started diltiazem gtt. probnp 8766->15K (best is around 5K)  Unsuccessful CV x2  Echo with LVEF 10-15%    Subjective:  Patient is being seen for chronic sHF. There were no acute overnight cardiac events. Weight 203->213 lasix drip increased to 10 mg/hr yesterday and continues on milrinone. She is making good urine. Her albumin is 2.7 She is sitting up in the chair in NSR. She denies any increased sob or chest pain. She likes to drink strawberry glucerna  probnp 65161->8949    Objective:   /74   Pulse 84   Temp 97.5 °F (36.4 °C) (Oral)   Resp 18   Ht 5' 8\" (1.727 m)   Wt 213 lb (96.6 kg)   SpO2 95%   BMI 32.39 kg/m²       Intake/Output Summary (Last 24 hours) at 4/23/2021 1120  Last data filed at 4/23/2021 1059  Gross per 24 hour   Intake 876.62 ml   Output 2740 ml   Net -1863.38 ml          Physical Exam:  General:  Awake, alert, oriented in NAD  Skin:  Warm and dry. No unusual bruising or rash  Neck:  Supple. + JVD or carotid bruit appreciated  Chest:  Normal effort.   Clear to auscultation, no wheezes/rhonchi/rales  Cardiovascular:  reg, S1/S2, no murmur/gallop/rub  Abdomen:  Soft, nontender, +bowel sounds  Extremities:  Bilateral ankle to lower thigh edema  Neurological: No focal deficits  Psychological: Normal mood and affect      Medications:    warfarin (COUMADIN) daily dosing (placeholder)   Other RX Placeholder    insulin glargine  15 Units Subcutaneous Daily    spironolactone  12.5 mg Oral Daily    potassium chloride  40 mEq Oral BID WC    insulin lispro  3 Units Subcutaneous TID WC    metoprolol succinate  25 mg Oral Daily    aspirin 81 mg Oral Daily    gabapentin  300 mg Oral Nightly    montelukast  10 mg Oral Nightly    pantoprazole  40 mg Oral QAM AC    sodium chloride flush  5-40 mL Intravenous 2 times per day    insulin lispro  0-12 Units Subcutaneous TID WC    insulin lispro  0-6 Units Subcutaneous Nightly    amiodarone  400 mg Oral BID      furosemide (LASIX) 1mg/ml infusion 10 mg/hr (04/23/21 0812)    milrinone 0.098 mcg/kg/min (04/23/21 0812)    sodium chloride      dextrose         Lab Data:  CBC: No results for input(s): WBC, HGB, PLT in the last 72 hours. BMP:    Recent Labs     04/21/21  0441 04/22/21 0419 04/23/21 0444    137 136   K 3.5 3.4* 3.5   CO2 27 26 27   BUN 27* 23* 24*   CREATININE 1.4* 1.2 1.6*     INR:    Recent Labs     04/21/21  0441 04/22/21 0419 04/23/21 0443   INR 2.76* 3.23* 3.44*     BNP:    Recent Labs     04/22/21 0419   PROBNP 6,712*         Diagnostics:  Echo 4/20/21  Summary   Overall left ventricular systolic function is severely depressed . Ejection fraction is visually estimated to be 10-15 %. E/e'= 23.2 GLS=-3.4   Moderately dilated left ventricle. There is severe diffuse hypokinesis. Indeterminate diastolic function. A bioprosthetic mitral valve is well seated with peak velocity of 1.59m/s   and a mean gradient of 3mmHg. The left atrium is moderately dilated. Mild aortic stenosis with a peak velocity of 2.5m/s and a mean pressure   gradient of 14mmHg. The aortic valve is thickened/calcified with decreased leaflet mobility   consistent with aortic stenosis. Mild to moderate tricuspid regurgitation.    Estimated pulmonary artery systolic pressure is mildly to moderately   elevated at 46 mmHg assuming a right atrial pressure of 8 mmHg    11/30/2020 Dobutamine Echo   Dobutamine echocardiogram for aortic stenosis.   Very technically limited exam due to atrial fibrillation.   Baseline echocardiogram shows severe left ventricular dysfunction with   anterior, lateral and apical hypokinesis with an ejection fraction of 20-25%.   There is no improvement in wall motion with low or high dose dobutamine   suggestive of nonviable myocardium.      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 After dobutamine stress the mean AV   gradient rises to 20mmHg with 20mcg of dobutamine consistent with mild to   moderate aortic stenosis.  Prosthetic mitral valve with a mean gradient of 7mmHg        JUDE 10/21/2020  Mildly dilated left ventricular size and normal wall thickness. Global left ventricular function is severely decreased with ejection fraction estimated at 25%. Patient is in atrial fibrillation during procedure.      The bioprosthetic mitral valve is well seated with a mean gradient of 5mmHg and maximum pressure gradient of 15 mmHg with heart rate of 89 bpm. Pressure half time of 82 ms. There is trivial mitral regurgitation.      Left atrial enlargement. Spontaneous echo contrast seen in the left atrium. A large stump of the left atrial appendage with no thrombus noted. Patient has history of surgical ligation of the left atrial appendage. There are spontaneous echo contrast in the atrial appendage.      The aortic valve is thickened/calcified with decreased leaflet mobility consistent with aortic stenosis. There is trivial aortic insufficiency.      There is moderate tricuspid regurgitation.     ECHO 9/15/20  Left ventricular cavity size is dilated. There is normal wall thickness with a moderately severe reduction in   systolic function.   LV ejection fraction is visually estimated to be 25-30%.   Indeterminate diastolic function.   The right ventricle is not well visualized but appears to be normal in size with moderately reduced function.   The left atrium is moderately dilated.   A bioprosthetic mitral valve with a mean gradient of 7 mmHg.  This may be a normal gradient for this valve but could suggest mild stenosis.   Trivial mitral regurgitation.   The aortic valve is thickened/calcified with a mean gradient of 16mmHg consistent with at least mild aortic stenosis. This is likely underestimated due to low cardiac output secondary to LV dysfunction.   No significant aortic valve regurgitation.   Moderate tricuspid regurgitation with RVSP of 48 mmHg. Assessment:    1. Atrial fibrillation with RVR, on coumadin  2. Chest pain  3. Dyspnea on exertion  4. Chronic systolic heart failure, on bb, no ace/arb/aldosterone antagonist due to TORIBIO  5. TORIBIO on CKD      Plan:    1. Nephrology following. continue lasix at 10 mg/hr  2. Daily weights  3. Continue metoprolol 25 mg daily  4. CHF nurse following for diet education, fluid restriction and daily weights  5. Continue milrinone 0.1 mcg/kg/min  6. Wrap lower legs discussed with bedside nurse and keep lower legs elevated  7. Continue low sodium diet and she will try and cut back more on her fluids  8. EP following, discussed ICD placement for next week  9. Needs to increase protein in her diet    Discussed with Dr Homero Bowers    Discussed with patient and  who are agreeable with plan of care. Thank you for allowing me to participate in the care of your patient.     Shadi Mcqueen, CNS

## 2021-04-24 LAB
ALBUMIN SERPL-MCNC: 3.1 G/DL (ref 3.4–5)
ANION GAP SERPL CALCULATED.3IONS-SCNC: 11 MMOL/L (ref 3–16)
BUN BLDV-MCNC: 28 MG/DL (ref 7–20)
CALCIUM SERPL-MCNC: 8.2 MG/DL (ref 8.3–10.6)
CHLORIDE BLD-SCNC: 99 MMOL/L (ref 99–110)
CO2: 24 MMOL/L (ref 21–32)
CREAT SERPL-MCNC: 1.6 MG/DL (ref 0.6–1.2)
GFR AFRICAN AMERICAN: 38
GFR NON-AFRICAN AMERICAN: 31
GLUCOSE BLD-MCNC: 104 MG/DL (ref 70–99)
GLUCOSE BLD-MCNC: 119 MG/DL (ref 70–99)
GLUCOSE BLD-MCNC: 123 MG/DL (ref 70–99)
GLUCOSE BLD-MCNC: 140 MG/DL (ref 70–99)
GLUCOSE BLD-MCNC: 81 MG/DL (ref 70–99)
GLUCOSE BLD-MCNC: 98 MG/DL (ref 70–99)
INR BLD: 2.99 (ref 0.86–1.14)
MAGNESIUM: 1.9 MG/DL (ref 1.8–2.4)
PERFORMED ON: ABNORMAL
PERFORMED ON: NORMAL
PERFORMED ON: NORMAL
PHOSPHORUS: 3.2 MG/DL (ref 2.5–4.9)
POTASSIUM SERPL-SCNC: 3.6 MMOL/L (ref 3.5–5.1)
PROTHROMBIN TIME: 35.1 SEC (ref 10–13.2)
SODIUM BLD-SCNC: 134 MMOL/L (ref 136–145)

## 2021-04-24 PROCEDURE — 6360000002 HC RX W HCPCS: Performed by: INTERNAL MEDICINE

## 2021-04-24 PROCEDURE — 80069 RENAL FUNCTION PANEL: CPT

## 2021-04-24 PROCEDURE — 6360000002 HC RX W HCPCS: Performed by: CLINICAL NURSE SPECIALIST

## 2021-04-24 PROCEDURE — 6370000000 HC RX 637 (ALT 250 FOR IP): Performed by: INTERNAL MEDICINE

## 2021-04-24 PROCEDURE — 6370000000 HC RX 637 (ALT 250 FOR IP): Performed by: PHYSICIAN ASSISTANT

## 2021-04-24 PROCEDURE — 2580000003 HC RX 258: Performed by: INTERNAL MEDICINE

## 2021-04-24 PROCEDURE — 6370000000 HC RX 637 (ALT 250 FOR IP): Performed by: NURSE PRACTITIONER

## 2021-04-24 PROCEDURE — 2580000003 HC RX 258: Performed by: CLINICAL NURSE SPECIALIST

## 2021-04-24 PROCEDURE — 2060000000 HC ICU INTERMEDIATE R&B

## 2021-04-24 PROCEDURE — 83735 ASSAY OF MAGNESIUM: CPT

## 2021-04-24 PROCEDURE — 36415 COLL VENOUS BLD VENIPUNCTURE: CPT

## 2021-04-24 PROCEDURE — 85610 PROTHROMBIN TIME: CPT

## 2021-04-24 PROCEDURE — 99232 SBSQ HOSP IP/OBS MODERATE 35: CPT | Performed by: INTERNAL MEDICINE

## 2021-04-24 RX ORDER — WARFARIN SODIUM 2.5 MG/1
2.5 TABLET ORAL DAILY
Status: DISCONTINUED | OUTPATIENT
Start: 2021-04-24 | End: 2021-04-24

## 2021-04-24 RX ORDER — DIPHENHYDRAMINE HCL 25 MG
25 TABLET ORAL EVERY 6 HOURS PRN
Status: DISCONTINUED | OUTPATIENT
Start: 2021-04-24 | End: 2021-05-05 | Stop reason: HOSPADM

## 2021-04-24 RX ADMIN — Medication 10 ML: at 09:55

## 2021-04-24 RX ADMIN — OXYCODONE AND ACETAMINOPHEN 1 TABLET: 325; 10 TABLET ORAL at 19:50

## 2021-04-24 RX ADMIN — DIPHENHYDRAMINE HYDROCHLORIDE 25 MG: 25 TABLET ORAL at 19:15

## 2021-04-24 RX ADMIN — AMIODARONE HYDROCHLORIDE 400 MG: 200 TABLET ORAL at 20:39

## 2021-04-24 RX ADMIN — SPIRONOLACTONE 12.5 MG: 25 TABLET ORAL at 09:55

## 2021-04-24 RX ADMIN — MILRINONE LACTATE 0.2 MCG/KG/MIN: 200 INJECTION, SOLUTION INTRAVENOUS at 22:57

## 2021-04-24 RX ADMIN — FUROSEMIDE 10 MG/HR: 10 INJECTION, SOLUTION INTRAMUSCULAR; INTRAVENOUS at 12:55

## 2021-04-24 RX ADMIN — DIPHENHYDRAMINE HYDROCHLORIDE 25 MG: 25 TABLET ORAL at 01:47

## 2021-04-24 RX ADMIN — PANTOPRAZOLE SODIUM 40 MG: 40 TABLET, DELAYED RELEASE ORAL at 05:00

## 2021-04-24 RX ADMIN — MONTELUKAST SODIUM 10 MG: 10 TABLET, FILM COATED ORAL at 20:39

## 2021-04-24 RX ADMIN — INSULIN LISPRO 3 UNITS: 100 INJECTION, SOLUTION INTRAVENOUS; SUBCUTANEOUS at 12:50

## 2021-04-24 RX ADMIN — OXYCODONE AND ACETAMINOPHEN 1 TABLET: 325; 10 TABLET ORAL at 07:30

## 2021-04-24 RX ADMIN — OXYCODONE 5 MG: 5 TABLET ORAL at 16:55

## 2021-04-24 RX ADMIN — POTASSIUM CHLORIDE 40 MEQ: 1500 TABLET, EXTENDED RELEASE ORAL at 09:55

## 2021-04-24 RX ADMIN — AMIODARONE HYDROCHLORIDE 400 MG: 200 TABLET ORAL at 09:55

## 2021-04-24 RX ADMIN — Medication 10 ML: at 17:40

## 2021-04-24 RX ADMIN — ASPIRIN 81 MG: 81 TABLET, CHEWABLE ORAL at 09:55

## 2021-04-24 RX ADMIN — INSULIN LISPRO 3 UNITS: 100 INJECTION, SOLUTION INTRAVENOUS; SUBCUTANEOUS at 17:25

## 2021-04-24 RX ADMIN — FUROSEMIDE 10 MG/HR: 10 INJECTION, SOLUTION INTRAMUSCULAR; INTRAVENOUS at 02:50

## 2021-04-24 RX ADMIN — OXYCODONE 5 MG: 5 TABLET ORAL at 02:54

## 2021-04-24 RX ADMIN — POTASSIUM CHLORIDE 40 MEQ: 1500 TABLET, EXTENDED RELEASE ORAL at 17:25

## 2021-04-24 RX ADMIN — OXYCODONE 5 MG: 5 TABLET ORAL at 09:55

## 2021-04-24 RX ADMIN — ONDANSETRON 4 MG: 2 INJECTION INTRAMUSCULAR; INTRAVENOUS at 17:40

## 2021-04-24 RX ADMIN — METOPROLOL SUCCINATE 25 MG: 25 TABLET, EXTENDED RELEASE ORAL at 09:55

## 2021-04-24 RX ADMIN — OXYCODONE AND ACETAMINOPHEN 1 TABLET: 325; 10 TABLET ORAL at 01:01

## 2021-04-24 RX ADMIN — INSULIN LISPRO 3 UNITS: 100 INJECTION, SOLUTION INTRAVENOUS; SUBCUTANEOUS at 09:55

## 2021-04-24 RX ADMIN — OXYCODONE AND ACETAMINOPHEN 1 TABLET: 325; 10 TABLET ORAL at 13:35

## 2021-04-24 ASSESSMENT — PAIN DESCRIPTION - PROGRESSION
CLINICAL_PROGRESSION: NOT CHANGED

## 2021-04-24 ASSESSMENT — PAIN SCALES - GENERAL
PAINLEVEL_OUTOF10: 8
PAINLEVEL_OUTOF10: 6
PAINLEVEL_OUTOF10: 5
PAINLEVEL_OUTOF10: 8
PAINLEVEL_OUTOF10: 6
PAINLEVEL_OUTOF10: 5

## 2021-04-24 NOTE — PROGRESS NOTES
Nephrology Attending  Progress Note        SUMMARY :  We are following this patient for TORIBIO on CKD stage 3   77 y.o. female who yesterday night started to have midsternal chest pain that was 7 out of 10 pressure-like associated with palpitations heart rate was up to 140s. Was short of breath no diaphoresis some nausea no vomiting. Patient came to the ED was in A. fib with RVR. SUBJECTIVE:   Patient progress reviewed. The patient says her dyspnea is better , No dysuria    in the room     Physical Exam:    VITALS:  /71   Pulse 92   Temp 98.1 °F (36.7 °C) (Oral)   Resp 18   Ht 5' 8\" (1.727 m)   Wt 213 lb (96.6 kg)   SpO2 94%   BMI 32.39 kg/m²   BLOOD PRESSURE RANGE:  Systolic (68GPY), MONIQUE:068 , Min:108 , EDJ:824   ; Diastolic (51IFT), WOX:50, Min:66, Max:79    24HR INTAKE/OUTPUT:      Intake/Output Summary (Last 24 hours) at 4/24/2021 1200  Last data filed at 4/24/2021 0930  Gross per 24 hour   Intake 1381.2 ml   Output 2135 ml   Net -753.8 ml       Gen:  alert, oriented x 3  PERRL , EOM +  Pallor +, No icterus  JVP not raised   CV: IRRR no murmur or rub .   Lungs:B/ L air entry, Normal breath sounds , scattered  crackles at bases   Abd: soft, bowel sounds + , No organomegaly   Ext: 1+ edema, no cyanosis  No  Rash   Neuro: nonfocal.      DATA:    CBC with Differential:    Lab Results   Component Value Date    WBC 4.0 04/16/2021    RBC 3.68 04/16/2021    HGB 10.0 04/16/2021    HCT 31.6 04/16/2021     04/16/2021    MCV 85.8 04/16/2021    MCH 27.1 04/16/2021    MCHC 31.6 04/16/2021    RDW 16.8 04/16/2021    SEGSPCT 64.1 09/02/2020    BANDSPCT 1 01/14/2019    LYMPHOPCT 15.1 04/16/2021    MONOPCT 14.7 04/16/2021    BASOPCT 0.8 04/16/2021    MONOSABS 0.6 04/16/2021    LYMPHSABS 0.6 04/16/2021    EOSABS 0.4 04/16/2021    BASOSABS 0.0 04/16/2021     CMP:    Lab Results   Component Value Date     04/24/2021    K 3.6 04/24/2021    K 3.5 04/21/2021    CL 99 04/24/2021    CO2 24 04/24/2021 BUN 28 04/24/2021    CREATININE 1.6 04/24/2021    GFRAA 38 04/24/2021    AGRATIO 0.8 04/15/2021    LABGLOM 31 04/24/2021    LABGLOM 45 12/06/2018    GLUCOSE 140 04/24/2021    PROT 6.6 04/15/2021    LABALBU 3.1 04/24/2021    CALCIUM 8.2 04/24/2021    BILITOT 0.6 04/15/2021    ALKPHOS 215 04/15/2021    AST 15 04/15/2021    ALT 10 04/15/2021     Magnesium:    Lab Results   Component Value Date    MG 1.90 04/24/2021     Phosphorus:    Lab Results   Component Value Date    PHOS 3.2 04/24/2021     Troponin:    Lab Results   Component Value Date    TROPONINI 0.02 04/15/2021     U/A:    Lab Results   Component Value Date    COLORU Yellow 04/15/2021    PROTEINU Negative 04/15/2021    PHUR 6.5 04/15/2021    WBCUA 3 04/15/2021    RBCUA 2 04/15/2021    YEAST neg 02/12/2021    BACTERIA trace 02/12/2021    BACTERIA 1+ 05/11/2020    CLARITYU Clear 04/15/2021    SPECGRAV 1.020 04/15/2021    LEUKOCYTESUR Negative 04/15/2021    UROBILINOGEN 0.2 04/15/2021    BILIRUBINUR Negative 04/15/2021    BLOODU TRACE-INTACT 04/15/2021    GLUCOSEU Negative 04/15/2021         IMPRESSION/RECOMMENDATIONS:      Diagnosis and Plan     1. TORIBIO   due to renal hypoperfusion secondary to hypotension as well as atrial fibrillation and diuretic use  Cr 1.6, stable     2. CKD stage 3   Presumptive  diabetic nephropathy, however no proteinuria    Baseline  creatinine 1.1    3. HTN     4. Afib with RVR    5.  sCHF    acute on chronic exacerbation, recent EF 10 to 15%   on milrinone and furosemide drips             Angelo Holiday

## 2021-04-24 NOTE — PROGRESS NOTES
Pt abdomen distended and taut. Pt also reports distension has significantly increased during her hospital stay. Family does confirm distension has increased. Pt has had recent admission for abdominal pain in which \"CT initially showed pneumatosis of small bowel and loculated pneumoperitoneum likely from pneumatosis\" per notes from previous admission. Pt and family concerned that increased pain is related and would like this addressed.

## 2021-04-24 NOTE — PROGRESS NOTES
[address]   Daily Progress Note      Admit Date:  4/15/2021    HPI:    Ms. Roberto Avendano 77 y.o. female with history of CAD/CABG in 2018, PAF with WAYNE clip 8/18 and maze 2018, ablation of AF, s/p ILR, HTN, HLD, DVT/PE on coumadin, 2 CVs unsuccessful, sHF    She is admitted with palpitations and tachycardia, found to be in AF with RVR and started diltiazem gtt. probnp 8766->15K (best is around 5K)  Unsuccessful CV x2  Echo with LVEF 10-15%    Subjective:  Patient is being seen for chronic sHF. There were no acute overnight cardiac events. No acute events overnight. She reports that her breathing has improved. No chest pain or pressrue    Objective:   /71   Pulse 84   Temp 98 °F (36.7 °C) (Oral)   Resp 18   Ht 5' 8\" (1.727 m)   Wt 213 lb (96.6 kg)   SpO2 95%   BMI 32.39 kg/m²       Intake/Output Summary (Last 24 hours) at 4/24/2021 1848  Last data filed at 4/24/2021 1645  Gross per 24 hour   Intake 1470.51 ml   Output 2260 ml   Net -789.49 ml          Physical Exam:  General:  Awake, alert, oriented in NAD  Skin:  Warm and dry. No unusual bruising or rash  Neck:  Supple. + JVD or carotid bruit appreciated  Chest:  Normal effort.   Clear to auscultation, no wheezes/rhonchi/rales  Cardiovascular:  reg, S1/S2, no murmur/gallop/rub  Abdomen:  Soft, nontender, +bowel sounds  Extremities:  Bilateral ankle to lower thigh edema  Neurological: No focal deficits  Psychological: Normal mood and affect      Medications:    warfarin  2.5 mg Oral Daily    lidocaine 1 % injection  5 mL Intradermal Once    sodium chloride flush  5-40 mL Intravenous 2 times per day    warfarin (COUMADIN) daily dosing (placeholder)   Other RX Placeholder    insulin glargine  15 Units Subcutaneous Daily    spironolactone  12.5 mg Oral Daily    potassium chloride  40 mEq Oral BID WC    insulin lispro  3 Units Subcutaneous TID WC    metoprolol succinate  25 mg Oral Daily    aspirin  81 mg Oral Daily    gabapentin  300 mg Oral Nightly    montelukast  10 mg Oral Nightly    pantoprazole  40 mg Oral QAM AC    sodium chloride flush  5-40 mL Intravenous 2 times per day    insulin lispro  0-12 Units Subcutaneous TID WC    insulin lispro  0-6 Units Subcutaneous Nightly    amiodarone  400 mg Oral BID      sodium chloride      furosemide (LASIX) 1mg/ml infusion 10 mg/hr (04/24/21 1255)    milrinone 0.0983 mcg/kg/min (04/23/21 1922)    sodium chloride      dextrose         Lab Data:  CBC: No results for input(s): WBC, HGB, PLT in the last 72 hours. BMP:    Recent Labs     04/22/21 0419 04/23/21 0444 04/24/21 0449    136 134*   K 3.4* 3.5 3.6   CO2 26 27 24   BUN 23* 24* 28*   CREATININE 1.2 1.6* 1.6*     INR:    Recent Labs     04/22/21 0419 04/23/21 0443 04/24/21 0449   INR 3.23* 3.44* 2.99*     BNP:    Recent Labs     04/22/21 0419   PROBNP 6,712*         Diagnostics:  Echo 4/20/21  Summary   Overall left ventricular systolic function is severely depressed . Ejection fraction is visually estimated to be 10-15 %. E/e'= 23.2 GLS=-3.4   Moderately dilated left ventricle. There is severe diffuse hypokinesis. Indeterminate diastolic function. A bioprosthetic mitral valve is well seated with peak velocity of 1.59m/s   and a mean gradient of 3mmHg. The left atrium is moderately dilated. Mild aortic stenosis with a peak velocity of 2.5m/s and a mean pressure   gradient of 14mmHg. The aortic valve is thickened/calcified with decreased leaflet mobility   consistent with aortic stenosis. Mild to moderate tricuspid regurgitation.    Estimated pulmonary artery systolic pressure is mildly to moderately   elevated at 46 mmHg assuming a right atrial pressure of 8 mmHg    11/30/2020 Dobutamine Echo   Dobutamine echocardiogram for aortic stenosis.   Very technically limited exam due to atrial fibrillation.   Baseline echocardiogram shows severe left ventricular dysfunction with   anterior, lateral and apical mitral regurgitation.   The aortic valve is thickened/calcified with a mean gradient of 16mmHg consistent with at least mild aortic stenosis. This is likely underestimated due to low cardiac output secondary to LV dysfunction.   No significant aortic valve regurgitation.   Moderate tricuspid regurgitation with RVSP of 48 mmHg. Assessment:    1. Atrial fibrillation with RVR, on coumadin  2. Chest pain  3. Dyspnea on exertion  4. Chronic systolic heart failure, on bb, no ace/arb/aldosterone antagonist due to TORIBIO  5. TORIBIO on CKD      Plan:    1. Nephrology following. continue lasix at 10 mg/hr  2. Daily weights  3. Continue metoprolol 25 mg daily  4. CHF nurse following for diet education, fluid restriction and daily weights  5. Increase milrinone 0.2 mcg/kg/min  6. Wrap lower legs discussed with bedside nurse and keep lower legs elevated  7. Continue low sodium diet and she will try and cut back more on her fluids  8. Hold warfarin for potential device implant Monday. EP following, discussed ICD placement for next week  9. Needs to increase protein in her diet    Discussed with Dr Izzy Hendrickson    Discussed with patient and  who are agreeable with plan of care. Thank you for allowing me to participate in the care of your patient.     Marely Hoyos, CNS

## 2021-04-24 NOTE — PROGRESS NOTES
HOSPITALISTS PROGRESS NOTE    4/24/2021 11:34 AM        Name: Lc Chavez . Admitted: 4/15/2021  Primary Care Provider: Linda Lopes MD (Tel: [phone])      Problem List  Active Problems:    PAF (paroxysmal atrial fibrillation) (HCC)    Chronic systolic CHF (congestive heart failure), NYHA class 3 (HCC)    Persistent atrial fibrillation (HCC)    Atrial fibrillation with rapid ventricular response (HCC)    Chest pain    Dyspnea on exertion    Acute kidney injury superimposed on CKD ([facility] [address]  Resolved Problems:    * No resolved hospital problems. *       Assessment & Plan:   Diabetes mellitus uncontrolled with hyperglycemia  Did cut down the dose of Lantus from 25 to 15 units on April 22 continue with the current dose at this time blood sugars are in good control    Acute on chronic systolic heart failure  Lasix and milrinone drip, nephrology and cardiology consulted, Lasix drip rate at 10    Persistent atrial fibrillation  History of left atrial appendage clipping, failed cardioversion, on Coumadin and amiodarone    TORIBIO on CKD  Better creatinine, nephrology following  Worsening renal function, adjustment of diuretic drip as per nephrology and cardiology    IV Access: Peripheral  Henry:  Diet: DIET CARDIAC; Low Sodium (2 GM); Daily Fluid Restriction: 2000 ml  Diet NPO, After Midnight Exceptions are: Sips of Water with Meds  Code:Prior  DVT PPX Coumadin  Disposition remains inpatient      Brief Course:    Patient admitted on 15 April with chest pain and back pain found to have A. fib with RVR along with heart failure. CC: Fluid overload    Subjective:  .   Patient sitting the chair, clear, slightly better, renal functions are stable although slightly up  Reviewed interval ancillary notes    Current Medications  diphenhydrAMINE (BENADRYL) tablet 25 mg, Q6H PRN  oxyCODONE-acetaminophen (PERCOCET)  MG per tablet 1 tablet, Q6H PRN  lidocaine PF 1 % injection 5 mL, Once  sodium chloride flush 0.9 % injection 5-40 mL, 2 times per day  sodium chloride flush 0.9 % injection 5-40 mL, PRN  0.9 % sodium chloride infusion, PRN  warfarin (COUMADIN) daily dosing (placeholder), RX Placeholder  insulin glargine (LANTUS;BASAGLAR) injection pen 15 Units, Daily  spironolactone (ALDACTONE) tablet 12.5 mg, Daily  oxyCODONE (ROXICODONE) immediate release tablet 5 mg, Q4H PRN  potassium chloride (KLOR-CON M) extended release tablet 40 mEq, BID WC  furosemide (LASIX) 100 mg in dextrose 5 % 100 mL infusion, Continuous  milrinone (PRIMACOR) 20 mg in dextrose 5 % 100 mL infusion, Continuous  insulin lispro (1 Unit Dial) 3 Units, TID WC  metoprolol succinate (TOPROL XL) extended release tablet 25 mg, Daily  albuterol (PROVENTIL) nebulizer solution 2.5 mg, Q6H PRN  aspirin chewable tablet 81 mg, Daily  gabapentin (NEURONTIN) capsule 300 mg, Nightly  montelukast (SINGULAIR) tablet 10 mg, Nightly  pantoprazole (PROTONIX) tablet 40 mg, QAM AC  sodium chloride flush 0.9 % injection 5-40 mL, 2 times per day  sodium chloride flush 0.9 % injection 5-40 mL, PRN  0.9 % sodium chloride infusion, PRN  promethazine (PHENERGAN) tablet 12.5 mg, Q6H PRN    Or  ondansetron (ZOFRAN) injection 4 mg, Q6H PRN  polyethylene glycol (GLYCOLAX) packet 17 g, Daily PRN  acetaminophen (TYLENOL) tablet 650 mg, Q6H PRN    Or  acetaminophen (TYLENOL) suppository 650 mg, Q6H PRN  insulin lispro (1 Unit Dial) 0-12 Units, TID WC  insulin lispro (1 Unit Dial) 0-6 Units, Nightly  glucose (GLUTOSE) 40 % oral gel 15 g, PRN  dextrose 50 % IV solution, PRN  glucagon (rDNA) injection 1 mg, PRN  dextrose 5 % solution, PRN  amiodarone (CORDARONE) tablet 400 mg, BID        Objective:  /71   Pulse 92   Temp 98.1 °F (36.7 °C) (Oral)   Resp 18   Ht 5' 8\" (1.727 m)   Wt 213 lb (96.6 kg)   SpO2 94%   BMI 32.39 kg/m²     Intake/Output Summary (Last 24 hours) at 4/24/2021 1134  Last data filed at 4/24/2021 0930  Gross per 24 hour   Intake 1381.2 ml   Output 2135 ml   Net -753.8 ml      Wt Readings from Last 3 Encounters:   04/24/21 213 lb (96.6 kg)   04/13/21 205 lb (93 kg)   04/02/21 206 lb 9.6 oz (93.7 kg)   Significant pedal edema  Obese  General appearance:  Appears comfortable  Eyes: Sclera clear. Pupils equal.  ENT: Moist oral mucosa. Trachea midline, no adenopathy. Cardiovascular: Regular rhythm, normal S1, S2. No murmur. Respiratory: Not using accessory muscles. Good inspiratory effort. Clear to auscultation bilaterally, no wheeze or crackles. GI: Abdomen soft, no tenderness, not distended, normal bowel sounds  Musculoskeletal: No cyanosis in digits, neck supple  Neurology: CN 2-12 grossly intact. No speech or motor deficits  Psych: Normal affect. Alert and oriented in time, place and person  Skin: Warm, dry, normal turgor  Extremity exam shows brisk capillary refill. Peripheral pulses are palpable in lower extremities     Labs and Tests:  CBC: No results for input(s): WBC, HGB, PLT in the last 72 hours. BMP:    Recent Labs     04/22/21  0419 04/23/21  0444 04/24/21  0449    136 134*   K 3.4* 3.5 3.6    100 99   CO2 26 27 24   BUN 23* 24* 28*   CREATININE 1.2 1.6* 1.6*   GLUCOSE 91 170* 140*     Hepatic: No results for input(s): AST, ALT, ALB, BILITOT, ALKPHOS in the last 72 hours. XR CHEST PORTABLE   Final Result   No acute cardiopulmonary disease. Stable cardiomegaly with no evidence of   overt failure.              Discussed care with family  \        Twila Carbajal MD   4/24/2021 11:34 AM  Set up [facility] [place]

## 2021-04-24 NOTE — PLAN OF CARE
Problem: Pain:  Goal: Control of acute pain  Description: Control of acute pain  4/24/2021 1003 by Kaleb Zamora RN  Outcome: Ongoing  Note: Patient with c/o pain 6/10. PRN Oxycodone administered per orders - see MAR. Will continue to monitor. Problem: Cardiovascular  Goal: Hemodynamic stability  4/24/2021 1003 by Kaleb Zamora RN  Outcome: Ongoing  Note: Patient VSS at this time on room air. Remains on Lasix and Primacor gtts per orders - see MAR. Will continue to monitor. Problem: Serum Glucose Level - Abnormal:  Goal: Ability to maintain appropriate glucose levels has stabilized  Description: Ability to maintain appropriate glucose levels has stabilized  4/24/2021 1003 by Kaleb Zamora RN  Note: Patient blood glucose 123 this AM.  Humalog and Lantus administered per orders - see MAR. Will continue to monitor.

## 2021-04-25 LAB
ALBUMIN SERPL-MCNC: 2.9 G/DL (ref 3.4–5)
ANION GAP SERPL CALCULATED.3IONS-SCNC: 10 MMOL/L (ref 3–16)
BUN BLDV-MCNC: 29 MG/DL (ref 7–20)
CALCIUM SERPL-MCNC: 8.5 MG/DL (ref 8.3–10.6)
CHLORIDE BLD-SCNC: 100 MMOL/L (ref 99–110)
CO2: 27 MMOL/L (ref 21–32)
CREAT SERPL-MCNC: 1.6 MG/DL (ref 0.6–1.2)
GFR AFRICAN AMERICAN: 38
GFR NON-AFRICAN AMERICAN: 31
GLUCOSE BLD-MCNC: 114 MG/DL (ref 70–99)
GLUCOSE BLD-MCNC: 115 MG/DL (ref 70–99)
GLUCOSE BLD-MCNC: 127 MG/DL (ref 70–99)
GLUCOSE BLD-MCNC: 147 MG/DL (ref 70–99)
GLUCOSE BLD-MCNC: 76 MG/DL (ref 70–99)
GLUCOSE BLD-MCNC: 98 MG/DL (ref 70–99)
INR BLD: 2.94 (ref 0.86–1.14)
MAGNESIUM: 1.9 MG/DL (ref 1.8–2.4)
PERFORMED ON: ABNORMAL
PERFORMED ON: NORMAL
PERFORMED ON: NORMAL
PHOSPHORUS: 4 MG/DL (ref 2.5–4.9)
POTASSIUM SERPL-SCNC: 3.7 MMOL/L (ref 3.5–5.1)
PROTHROMBIN TIME: 34.5 SEC (ref 10–13.2)
SODIUM BLD-SCNC: 137 MMOL/L (ref 136–145)

## 2021-04-25 PROCEDURE — 99232 SBSQ HOSP IP/OBS MODERATE 35: CPT | Performed by: NURSE PRACTITIONER

## 2021-04-25 PROCEDURE — 2580000003 HC RX 258: Performed by: CLINICAL NURSE SPECIALIST

## 2021-04-25 PROCEDURE — 83735 ASSAY OF MAGNESIUM: CPT

## 2021-04-25 PROCEDURE — 6370000000 HC RX 637 (ALT 250 FOR IP): Performed by: INTERNAL MEDICINE

## 2021-04-25 PROCEDURE — 85610 PROTHROMBIN TIME: CPT

## 2021-04-25 PROCEDURE — 94640 AIRWAY INHALATION TREATMENT: CPT

## 2021-04-25 PROCEDURE — 6370000000 HC RX 637 (ALT 250 FOR IP): Performed by: PHYSICIAN ASSISTANT

## 2021-04-25 PROCEDURE — 2060000000 HC ICU INTERMEDIATE R&B

## 2021-04-25 PROCEDURE — 80069 RENAL FUNCTION PANEL: CPT

## 2021-04-25 PROCEDURE — 6360000002 HC RX W HCPCS: Performed by: INTERNAL MEDICINE

## 2021-04-25 PROCEDURE — 6370000000 HC RX 637 (ALT 250 FOR IP): Performed by: NURSE PRACTITIONER

## 2021-04-25 PROCEDURE — 94760 N-INVAS EAR/PLS OXIMETRY 1: CPT

## 2021-04-25 PROCEDURE — 36415 COLL VENOUS BLD VENIPUNCTURE: CPT

## 2021-04-25 PROCEDURE — 6360000002 HC RX W HCPCS: Performed by: CLINICAL NURSE SPECIALIST

## 2021-04-25 RX ADMIN — POTASSIUM CHLORIDE 40 MEQ: 1500 TABLET, EXTENDED RELEASE ORAL at 07:46

## 2021-04-25 RX ADMIN — POLYETHYLENE GLYCOL 3350 17 G: 17 POWDER, FOR SOLUTION ORAL at 07:46

## 2021-04-25 RX ADMIN — INSULIN LISPRO 3 UNITS: 100 INJECTION, SOLUTION INTRAVENOUS; SUBCUTANEOUS at 12:52

## 2021-04-25 RX ADMIN — FUROSEMIDE 10 MG/HR: 10 INJECTION, SOLUTION INTRAMUSCULAR; INTRAVENOUS at 01:42

## 2021-04-25 RX ADMIN — OXYCODONE 5 MG: 5 TABLET ORAL at 12:04

## 2021-04-25 RX ADMIN — SPIRONOLACTONE 12.5 MG: 25 TABLET ORAL at 07:46

## 2021-04-25 RX ADMIN — INSULIN LISPRO 3 UNITS: 100 INJECTION, SOLUTION INTRAVENOUS; SUBCUTANEOUS at 17:41

## 2021-04-25 RX ADMIN — POTASSIUM CHLORIDE 40 MEQ: 1500 TABLET, EXTENDED RELEASE ORAL at 17:41

## 2021-04-25 RX ADMIN — AMIODARONE HYDROCHLORIDE 400 MG: 200 TABLET ORAL at 22:07

## 2021-04-25 RX ADMIN — ASPIRIN 81 MG: 81 TABLET, CHEWABLE ORAL at 07:46

## 2021-04-25 RX ADMIN — OXYCODONE 5 MG: 5 TABLET ORAL at 22:07

## 2021-04-25 RX ADMIN — INSULIN LISPRO 2 UNITS: 100 INJECTION, SOLUTION INTRAVENOUS; SUBCUTANEOUS at 12:52

## 2021-04-25 RX ADMIN — METOPROLOL SUCCINATE 25 MG: 25 TABLET, EXTENDED RELEASE ORAL at 07:46

## 2021-04-25 RX ADMIN — ALBUTEROL SULFATE 2.5 MG: 2.5 SOLUTION RESPIRATORY (INHALATION) at 22:28

## 2021-04-25 RX ADMIN — FUROSEMIDE 10 MG/HR: 10 INJECTION, SOLUTION INTRAMUSCULAR; INTRAVENOUS at 21:02

## 2021-04-25 RX ADMIN — AMIODARONE HYDROCHLORIDE 400 MG: 200 TABLET ORAL at 07:46

## 2021-04-25 RX ADMIN — INSULIN LISPRO 3 UNITS: 100 INJECTION, SOLUTION INTRAVENOUS; SUBCUTANEOUS at 09:26

## 2021-04-25 RX ADMIN — MONTELUKAST SODIUM 10 MG: 10 TABLET, FILM COATED ORAL at 22:14

## 2021-04-25 RX ADMIN — OXYCODONE AND ACETAMINOPHEN 1 TABLET: 325; 10 TABLET ORAL at 16:57

## 2021-04-25 RX ADMIN — OXYCODONE AND ACETAMINOPHEN 1 TABLET: 325; 10 TABLET ORAL at 07:47

## 2021-04-25 RX ADMIN — MILRINONE LACTATE 0.2 MCG/KG/MIN: 200 INJECTION, SOLUTION INTRAVENOUS at 16:47

## 2021-04-25 RX ADMIN — DIPHENHYDRAMINE HYDROCHLORIDE 25 MG: 25 TABLET ORAL at 22:14

## 2021-04-25 RX ADMIN — FUROSEMIDE 10 MG/HR: 10 INJECTION, SOLUTION INTRAMUSCULAR; INTRAVENOUS at 21:19

## 2021-04-25 RX ADMIN — FUROSEMIDE 10 MG/HR: 10 INJECTION, SOLUTION INTRAMUSCULAR; INTRAVENOUS at 09:47

## 2021-04-25 RX ADMIN — PANTOPRAZOLE SODIUM 40 MG: 40 TABLET, DELAYED RELEASE ORAL at 06:10

## 2021-04-25 ASSESSMENT — PAIN SCALES - GENERAL
PAINLEVEL_OUTOF10: 8
PAINLEVEL_OUTOF10: 9
PAINLEVEL_OUTOF10: 8
PAINLEVEL_OUTOF10: 7

## 2021-04-25 NOTE — PROGRESS NOTES
HOSPITALISTS PROGRESS NOTE    4/25/2021 1:19 PM        Name: Fatoumata Vargas . Admitted: 4/15/2021  Primary Care Provider: Nuvia Pfeiffer MD (Tel: [phone])      Problem List  Active Problems:    PAF (paroxysmal atrial fibrillation) (HCC)    Chronic systolic CHF (congestive heart failure), NYHA class 3 (HCC)    Persistent atrial fibrillation (HCC)    Atrial fibrillation with rapid ventricular response (HCC)    Chest pain    Dyspnea on exertion    Acute kidney injury superimposed on CKD ([facility] [address]  Resolved Problems:    * No resolved hospital problems. *       Assessment & Plan:   Plan for AICD as per cardiology, n.p.o. after midnight  Patient still appears in fluid overload    Acute on chronic systolic heart failure  Lasix and milrinone drip, nephrology and cardiology consulted, Lasix drip rate at 10, loss of 3 pounds overnight    Persistent atrial fibrillation  History of left atrial appendage clipping, failed cardioversion, on Coumadin and amiodarone    TORIBIO on CKD  Better creatinine, nephrology following  Worsening renal function, adjustment of diuretic drip as per nephrology and cardiology    Diabetes mellitus uncontrolled with hypoglycemia during the stay  Did cut down the dose of Lantus from 25 to 15 units on April 22 continue with the current dose at this time blood sugars are in good control  IV Access: Peripheral  Henry:  Diet: DIET CARDIAC; Low Sodium (2 GM); Daily Fluid Restriction: 2000 ml  Diet NPO, After Midnight Exceptions are: Sips of Water with Meds  Diet NPO, After Midnight Exceptions are: Sips of Water with Meds  Code:Prior  DVT PPX Coumadin  Disposition remains inpatient      Brief Course:    Patient admitted on 15 April with chest pain and back pain found to have A. fib with RVR along with heart failure. EF of about 15%. On milrinone and Lasix drip. Patient getting possible AICD during the stay.   But sugar slightly on the lower side during the stay had to cut back on the Lantus dosing. CC: Fluid overload    Subjective:  .   Patient feels better, lying in the bed, making good urine, on Lasix and milrinone drip, renal functions are slightly up but stable,  Reviewed interval ancillary notes    Current Medications  diphenhydrAMINE (BENADRYL) tablet 25 mg, Q6H PRN  oxyCODONE-acetaminophen (PERCOCET)  MG per tablet 1 tablet, Q6H PRN  lidocaine PF 1 % injection 5 mL, Once  sodium chloride flush 0.9 % injection 5-40 mL, 2 times per day  sodium chloride flush 0.9 % injection 5-40 mL, PRN  0.9 % sodium chloride infusion, PRN  warfarin (COUMADIN) daily dosing (placeholder), RX Placeholder  insulin glargine (LANTUS;BASAGLAR) injection pen 15 Units, Daily  spironolactone (ALDACTONE) tablet 12.5 mg, Daily  oxyCODONE (ROXICODONE) immediate release tablet 5 mg, Q4H PRN  potassium chloride (KLOR-CON M) extended release tablet 40 mEq, BID WC  furosemide (LASIX) 100 mg in dextrose 5 % 100 mL infusion, Continuous  milrinone (PRIMACOR) 20 mg in dextrose 5 % 100 mL infusion, Continuous  insulin lispro (1 Unit Dial) 3 Units, TID WC  metoprolol succinate (TOPROL XL) extended release tablet 25 mg, Daily  albuterol (PROVENTIL) nebulizer solution 2.5 mg, Q6H PRN  aspirin chewable tablet 81 mg, Daily  gabapentin (NEURONTIN) capsule 300 mg, Nightly  montelukast (SINGULAIR) tablet 10 mg, Nightly  pantoprazole (PROTONIX) tablet 40 mg, QAM AC  sodium chloride flush 0.9 % injection 5-40 mL, 2 times per day  sodium chloride flush 0.9 % injection 5-40 mL, PRN  0.9 % sodium chloride infusion, PRN  promethazine (PHENERGAN) tablet 12.5 mg, Q6H PRN    Or  ondansetron (ZOFRAN) injection 4 mg, Q6H PRN  polyethylene glycol (GLYCOLAX) packet 17 g, Daily PRN  acetaminophen (TYLENOL) tablet 650 mg, Q6H PRN    Or  acetaminophen (TYLENOL) suppository 650 mg, Q6H PRN  insulin lispro (1 Unit Dial) 0-12 Units, TID WC  insulin lispro (1 Unit Dial) 0-6 Units, Nightly  glucose (GLUTOSE) 40 % oral gel 15 g, PRN  dextrose 50 % IV solution, PRN  glucagon (rDNA) injection 1 mg, PRN  dextrose 5 % solution, PRN  amiodarone (CORDARONE) tablet 400 mg, BID        Objective:  /70   Pulse 86   Temp 97.3 °F (36.3 °C) (Oral)   Resp 16   Ht 5' 8\" (1.727 m)   Wt 210 lb 4.8 oz (95.4 kg)   SpO2 93%   BMI 31.98 kg/m²     Intake/Output Summary (Last 24 hours) at 4/25/2021 1319  Last data filed at 4/25/2021 1255  Gross per 24 hour   Intake 1812.36 ml   Output 2350 ml   Net -537.64 ml      Wt Readings from Last 3 Encounters:   04/25/21 210 lb 4.8 oz (95.4 kg)   04/13/21 205 lb (93 kg)   04/02/21 206 lb 9.6 oz (93.7 kg)   Significant pedal edema  Obese  General appearance:  Appears comfortable  Eyes: Sclera clear. Pupils equal.  ENT: Moist oral mucosa. Trachea midline, no adenopathy. Cardiovascular: Regular rhythm, normal S1, S2. No murmur. Respiratory: Not using accessory muscles. Good inspiratory effort. Clear to auscultation bilaterally, no wheeze or crackles. GI: Abdomen soft, no tenderness, not distended, normal bowel sounds  Musculoskeletal: No cyanosis in digits, neck supple  Neurology: CN 2-12 grossly intact. No speech or motor deficits  Psych: Normal affect. Alert and oriented in time, place and person  Skin: Warm, dry, normal turgor  Extremity exam shows brisk capillary refill. Peripheral pulses are palpable in lower extremities     Labs and Tests:  CBC: No results for input(s): WBC, HGB, PLT in the last 72 hours. BMP:    Recent Labs     04/23/21  0444 04/24/21  0449 04/25/21  0524    134* 137   K 3.5 3.6 3.7    99 100   CO2 27 24 27   BUN 24* 28* 29*   CREATININE 1.6* 1.6* 1.6*   GLUCOSE 170* 140* 127*     Hepatic: No results for input(s): AST, ALT, ALB, BILITOT, ALKPHOS in the last 72 hours. XR CHEST PORTABLE   Final Result   No acute cardiopulmonary disease. Stable cardiomegaly with no evidence of   overt failure.              Discussed care with family  \        Angelica Azevedo MD   4/25/2021 1:19 PM  Set up [facility] [place]

## 2021-04-25 NOTE — PLAN OF CARE
Patient remains on room air. Pain is controlled well with oxycodone and percocet. Pt is experiencing itching-benadryl providing some relief. Pt remains on lasix and milrinone drip. Glucose in 80s this evening. Snack provided to patient and no humalog SSI given. Abdomen remains distended, but is not as taut. Vitals remain stable.      Vitals:    04/24/21 2038   BP: 102/63   Pulse: 84   Resp: 18   Temp: 97.8 °F (36.6 °C)   SpO2: 94%       Problem: Pain:  Goal: Control of acute pain  Description: Control of acute pain  4/24/2021 2049 by Janak Nugent RN  Outcome: Ongoing     Problem: Cardiovascular  Goal: No DVT, peripheral vascular complications  Outcome: Ongoing     Problem: Respiratory  Goal: No pulmonary complications  Outcome: Ongoing     Problem: Serum Glucose Level - Abnormal:  Goal: Ability to maintain appropriate glucose levels has stabilized  Description: Ability to maintain appropriate glucose levels has stabilized  4/24/2021 2049 by Janak Nugent RN  Outcome: Ongoing

## 2021-04-25 NOTE — PROGRESS NOTES
[address]   Cardiology Progress Note     Date: 4/25/2021  Admit Date: 4/15/2021     Reason for consultation: sHF / afib     Chief Complaint:   Chief Complaint   Patient presents with    Chest Pain    Back Pain       History of Present Illness: History obtained from patient and medical record. Isabella Eckert is a 77 y.o. female with a past medical history of CAD/CABG in 2018, PAF with WAYNE clip 8/18 and maze 2018, ablation of AF, s/p ILR, HTN, HLD, DVT/PE on coumadin, 2 CVs unsuccessful, sHF. She is admitted with palpitations and tachycardia, found to be in AF with RVR and started diltiazem gtt. probnp 8766->15K (best is around 5K)  Unsuccessful CV x2  Echo with LVEF 10-15%     Interval Hx: Today, she says she \"loses all breath when she moves around. \" No issues overnight. Aware of plan for AICD placement in AM. NPO at midnight order in. Patient seen and examined. Clinical notes reviewed. Telemetry reviewed. No new complaints today. No major events overnight. Denies having chest pain, palpitations, shortness of breath, orthopnea/PND, cough, or dizziness at the time of this visit. Allergies: Allergies   Allergen Reactions    Fqcohktv-Bwjqnom-Grtwbo [Fluocinolone] Shortness Of Breath    Ciprofloxacin Shortness Of Breath    Diovan [Valsartan] Shortness Of Breath    Flagyl [Metronidazole] Shortness Of Breath     Has taken diflucan at home 12/7/15    Metformin And Related [Metformin And Related] Shortness Of Breath    Benazepril      Other reaction(s): Not Recorded    Morphine      Bad reaction. \"makes her feel horrible\".  Saxagliptin      Other reaction(s): Not Recorded    Levaquin [Levofloxacin] Rash       Home Meds:  Prior to Visit Medications    Medication Sig Taking?  Authorizing Provider   spironolactone (ALDACTONE) 25 MG tablet Take 2 tablets by mouth daily Yes SOLEDAD Bella - CNS   torsemide (DEMADEX) 20 MG tablet 40mg morning, 40mg afternoon, 20mg evening Yes Germain James MD   carvedilol (COREG) 6.25 MG tablet Take 1 tablet by mouth daily Yes Germain James MD   gabapentin (NEURONTIN) 300 MG capsule Take 1 capsule by mouth nightly for 90 days. Intended supply: 30 days Yes Nuvia Pfeiffer MD   insulin glargine (LANTUS SOLOSTAR) 100 UNIT/ML injection pen INJECT 26 UNITS IN THE MORNING AND 18 UNITS AT BEDTIME Yes Elva Russell MD   exenatide (BYETTA 10 MCG PEN) 10 MCG/0.04ML injection Inject 0.04 mLs into the skin 2 times daily (with meals) Yes Nuvia Pfeiffer MD   OXYCODONE HCL PO Take 10 mg by mouth As of 1/21/2021,  states patient is taking 10mg with edge being taken off her pain after 3 hours.  Yes Historical Provider, MD   ACETAMINOPHEN PO Take 500 mg by mouth every 6 hours as needed  Yes Historical Provider, MD   albuterol sulfate  (90 Base) MCG/ACT inhaler USE 2 INHALATIONS EVERY 6 HOURS AS NEEDED FOR WHEEZING Yes Nuvia Pfeiffer MD   albuterol (PROVENTIL) (2.5 MG/3ML) 0.083% nebulizer solution Take 3 mLs by nebulization every 6 hours as needed for Wheezing Yes Nuvia Pfeiffer MD   polyethylene glycol (GLYCOLAX) 17 GM/SCOOP powder Take 17 g by mouth every other day  Yes Historical Provider, MD   omeprazole (PRILOSEC) 20 MG delayed release capsule Take 20 mg by mouth daily Yes Historical Provider, MD   ondansetron (ZOFRAN) 4 MG tablet Take 1 tablet by mouth 3 times daily as needed for Nausea or Vomiting Yes Nuvia Pfeiffer MD   aspirin 81 MG chewable tablet Take 1 tablet by mouth daily Yes Nuvia Pfeiffer MD   vitamin B-12 500 MCG tablet Take 1 tablet by mouth daily Yes Niki Zamora MD   potassium chloride (KLOR-CON M) 10 MEQ extended release tablet TAKE 1 TABLET DAILY  Nuvia Pfeiffer MD   predniSONE (DELTASONE) 10 MG tablet TAKE 1 TABLET AS NEEDED (EOSINOPHILIC GASATRITIS)  Nuvia Pfeiffer MD   montelukast (SINGULAIR) 10 MG tablet TAKE 1 TABLET NIGHTLY  Nuvia Pfeiffer MD   warfarin (COUMADIN) 5 MG tablet TAKE 1 Sunny Sharma MD   blood glucose test strips (FREESTYLE LITE) strip USE TO TEST FOUR TIMES A DAY  Margy Hinojosa MD   FreeStyle Lancets MISC 1 each by Does not apply route 4 times daily  Margy Hinojosa MD   Blood Glucose Monitoring Suppl (EMBRACE PRO GLUCOSE METER) GAETANO 1 Device by Does not apply route 4 times daily  Margy Hinojosa MD   HUMALOG KWIKPEN 100 UNIT/ML SOPN TAKE AS INSTRUCTED BY YOUR PRESCRIBER  Patient taking differently:  Only if high blood sugar-has not been using  Margy Hinojosa MD   nystatin (MYCOSTATIN) 194955 UNIT/ML suspension TAKE ONE TEASPOONFUL BY MOUTH FOUR TIMES A DAY FOR 5 DAYS  Margy Hinojosa MD   BD PEN NEEDLE JANNET U/F 32G X 4 MM MISC USE 1 PEN NEEDLE FOUR TIMES A DAY  Margy Hinojosa MD      Scheduled Meds:   lidocaine 1 % injection  5 mL Intradermal Once    sodium chloride flush  5-40 mL Intravenous 2 times per day    warfarin (COUMADIN) daily dosing (placeholder)   Other RX Placeholder    insulin glargine  15 Units Subcutaneous Daily    spironolactone  12.5 mg Oral Daily    potassium chloride  40 mEq Oral BID WC    insulin lispro  3 Units Subcutaneous TID WC    metoprolol succinate  25 mg Oral Daily    aspirin  81 mg Oral Daily    gabapentin  300 mg Oral Nightly    montelukast  10 mg Oral Nightly    pantoprazole  40 mg Oral QAM AC    sodium chloride flush  5-40 mL Intravenous 2 times per day    insulin lispro  0-12 Units Subcutaneous TID WC    insulin lispro  0-6 Units Subcutaneous Nightly    amiodarone  400 mg Oral BID     Continuous Infusions:   sodium chloride      furosemide (LASIX) 1mg/ml infusion 10 mg/hr (04/25/21 0948)    milrinone 0.2 mcg/kg/min (04/25/21 0948)    sodium chloride      dextrose       PRN Meds:diphenhydrAMINE, oxyCODONE-acetaminophen, sodium chloride flush, sodium chloride, oxyCODONE, albuterol, sodium chloride flush, sodium chloride, promethazine **OR** ondansetron, polyethylene glycol, acetaminophen **OR** acetaminophen, glucose, dextrose, glucagon (rDNA), dextrose     Past Medical History:  Past Medical History:   Diagnosis Date    Asthma     Atrial fibrillation (HCC)     Eosinophilia     Hemoptysis     HIGH CHOLESTEROL     Hx of blood clots     Hypertension     Irregular heart beat     Other specified gastritis without mention of hemorrhage     Palpitations     Skin cancer     basal and squamous    Type II or unspecified type diabetes mellitus without mention of complication, not stated as uncontrolled         Past Surgical History:    has a past surgical history that includes Cholecystectomy;  section; Colonoscopy (2017); skin biopsy; bronchoscopy (2016); Coronary artery bypass graft (2018); Mitral valve replacement (2018); transesophageal echocardiogram (2018); Tunneled venous catheter placement (Left, 2018); Cardiac catheterization (2018); Insertable Cardiac Monitor (Left, 2018); Colonoscopy (2014); Hysterectomy; Upper gastrointestinal endoscopy (N/A, 10/10/2018); Colonoscopy (10/10/2018); Colonoscopy (N/A, 2020); Pain management procedure (N/A, 2020); and Pain management procedure (Bilateral, 2021). Social History:  Reviewed. reports that she has never smoked. She has never used smokeless tobacco. She reports that she does not drink alcohol or use drugs. Family History:  Reviewed. family history includes Asthma in her mother; Cancer in her father; Heart Disease in her mother; High Blood Pressure in her mother; Hypertension in her mother.  Denies family history of sudden cardiac death, arrhythmia, premature CAD    Review of Systems:  · Constitutional: Negative for fever, night sweats, chills, weight changes, or weakness  · Skin: Negative for rash, dry skin, pruritus, bruising, bleeding, blood clots, or changes in skin pigment  · HEENT: Negative for vision changes, ringing in the ears, sore throat, dysphagia, or swollen lymph nodes  · Respiratory: Reviewed in HPI  · Cardiovascular: Reviewed in HPI  · Gastrointestinal: Negative for abdominal pain, N/V/D, constipation, or black/tarry stools  · Genito-Urinary: Negative for dysuria, incontinence, urgency, or hematuria  · Musculoskeletal: Negative for joint swelling, muscle pain, or injuries  · Neurological/Psych: Negative for confusion, seizures, headaches, balance issues or TIA-like symptoms. No anxiety, depression, or insomnia    Physical Examination:  Vitals:    04/25/21 0738   BP: 119/70   Pulse: 97   Resp: 16   Temp: 98.1 °F (36.7 °C)   SpO2: 92%      In: 1632.4 [P.O.:1110; I.V.:522.4]  Out: 2350    Wt Readings from Last 3 Encounters:   04/25/21 210 lb 4.8 oz (95.4 kg)   04/13/21 205 lb (93 kg)   04/02/21 206 lb 9.6 oz (93.7 kg)       Intake/Output Summary (Last 24 hours) at 4/25/2021 1056  Last data filed at 4/25/2021 6034  Gross per 24 hour   Intake 1632.36 ml   Output 2350 ml   Net -717.64 ml       Telemetry: Personally Reviewed  - Sinus rhythm   · Constitutional: Cooperative and in no apparent distress, and appears well nourished  · Skin: Warm and pink; no pallor, cyanosis, bruising, or clubbing  · HEENT: Symmetric and normocephalic. PERRL, EOM intact. · Cardiovascular: Regular rate and rhythm. S1/S2 present without murmurs, rubs, or gallops. Peripheral pulses 2+, capillary refill < 3 seconds. No elevation of JVP. Mikie BLE, +2 edema   · Respiratory: Respirations symmetric and unlabored. Lungs clear to auscultation bilaterally, no wheezing, crackles, or rhonchi  · Gastrointestinal: Abdomen soft and round. Bowel sounds normoactive in all quadrants without tenderness or masses. · Musculoskeletal: Bilateral upper and lower extremity strength 5/5 with full ROM  · Neurologic/Psych: Awake and orientated to person, place and time.  Calm affect, appropriate mood    Pertinent labs, diagnostic, device, and imaging results reviewed as a part of this visit    Labs:    BMP: Recent Labs     04/23/21  0444 04/24/21  0449 04/25/21  0524    134* 137   K 3.5 3.6 3.7    99 100   CO2 27 24 27   PHOS 2.9 3.2 4.0   BUN 24* 28* 29*   CREATININE 1.6* 1.6* 1.6*   MG 2.00 1.90 1.90     Estimated Creatinine Clearance: 37 mL/min (A) (based on SCr of 1.6 mg/dL (H)). CBC: No results for input(s): WBC, HGB, HCT, MCV, PLT in the last 72 hours. Thyroid:   Lab Results   Component Value Date    TSH 1.90 04/01/2021     Lipids:   Lab Results   Component Value Date    CHOL 149 12/23/2020    HDL 40 12/23/2020    TRIG 94 03/03/2021     LFTS:   Lab Results   Component Value Date    ALT 10 04/15/2021    AST 15 04/15/2021    ALKPHOS 215 04/15/2021    PROT 6.6 04/15/2021    AGRATIO 0.8 04/15/2021    BILITOT 0.6 04/15/2021     Cardiac Enzymes:   Lab Results   Component Value Date    TROPONINI 0.02 04/15/2021    TROPONINI 0.02 04/15/2021    TROPONINI <0.01 05/10/2020     Coags:   Lab Results   Component Value Date    PROTIME 34.5 04/25/2021    INR 2.94 04/25/2021     Echo 4/20/21  Summary   Overall left ventricular systolic function is severely depressed .   Ejection fraction is visually estimated to be 10-15 %.  E/e'= 23.2 GLS=-3.4   Moderately dilated left ventricle.   There is severe diffuse hypokinesis.   Indeterminate diastolic function.   A bioprosthetic mitral valve is well seated with peak velocity of 1.59m/s   and a mean gradient of 3mmHg.   The left atrium is moderately dilated.   Mild aortic stenosis with a peak velocity of 2.5m/s and a mean pressure   gradient of 14mmHg.   The aortic valve is thickened/calcified with decreased leaflet mobility   consistent with aortic stenosis.   Mild to moderate tricuspid regurgitation.   Estimated pulmonary artery systolic pressure is mildly to moderately   elevated at 46 mmHg assuming a right atrial pressure of 8 mmHg     11/30/2020 Dobutamine Echo   Dobutamine echocardiogram for aortic stenosis.   Very technically limited exam due to atrial fibrillation.   Baseline echocardiogram shows severe left ventricular dysfunction with   anterior, lateral and apical hypokinesis with an ejection fraction of 20-25%.   There is no improvement in wall motion with low or high dose dobutamine   suggestive of nonviable myocardium.      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 After dobutamine stress the mean AV   gradient rises to 20mmHg with 20mcg of dobutamine consistent with mild to   moderate aortic stenosis.  Prosthetic mitral valve with a mean gradient of 7mmHg        JUDE 10/21/2020  Mildly dilated left ventricular size and normal wall thickness. Global left ventricular function is severely decreased with ejection fraction estimated at 25%. Patient is in atrial fibrillation during procedure.      The bioprosthetic mitral valve is well seated with a mean gradient of 5mmHg and maximum pressure gradient of 15 mmHg with heart rate of 89 bpm. Pressure half time of 82 ms. There is trivial mitral regurgitation.      Left atrial enlargement. Spontaneous echo contrast seen in the left atrium. A large stump of the left atrial appendage with no thrombus noted. Patient has history of surgical ligation of the left atrial appendage. There are spontaneous echo contrast in the atrial appendage.      The aortic valve is thickened/calcified with decreased leaflet mobility consistent with aortic stenosis.  There is trivial aortic insufficiency.      There is moderate tricuspid regurgitation.     ECHO 9/15/20  Left ventricular cavity size is dilated. There is normal wall thickness with a moderately severe reduction in   systolic function.   LV ejection fraction is visually estimated to be 25-30%.   Indeterminate diastolic function.   The right ventricle is not well visualized but appears to be normal in size with moderately reduced function.   The left atrium is moderately dilated.   A bioprosthetic mitral valve with a mean gradient of 7 mmHg. This may be a normal gradient for this valve but could suggest mild stenosis.   Trivial mitral regurgitation.   The aortic valve is thickened/calcified with a mean gradient of 16mmHg consistent with at least mild aortic stenosis. This is likely underestimated due to low cardiac output secondary to LV dysfunction.   No significant aortic valve regurgitation.   Moderate tricuspid regurgitation with RVSP of 48 mmHg.     Problem List:   Patient Active Problem List    Diagnosis Date Noted    Atrial fibrillation with rapid ventricular response ([address]     Chest pain     Dyspnea on exertion     Acute kidney injury superimposed on CKD ([address]     Hypotension     Acute on chronic systolic heart failure due to valvular disease ([address] 02/26/2021    Stage 3 chronic kidney disease     Coronary artery disease involving native heart without angina pectoris     Deep vein thrombosis (DVT) of non-extremity vein 12/15/2020    Aortic valve stenosis 11/03/2020    Pneumatosis intestinalis of small intestine 05/10/2020    Ischemic cardiomyopathy 01/20/2020    Occlusion and stenosis of bilateral carotid arteries 01/20/2020    Persistent atrial fibrillation ([address] 10/30/2019    S/P MVR (mitral valve repair)     Thoracic degenerative disc disease 06/07/2019    Chronic systolic CHF (congestive heart failure), NYHA class 3 ([address] 01/15/2019    Obesity, Class I, BMI 30-34.9     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

## 2021-04-25 NOTE — PROGRESS NOTES
Nephrology Attending  Progress Note        SUMMARY :  We are following this patient for TORIBIO on CKD stage 3   77 y.o. female who yesterday night started to have midsternal chest pain that was 7 out of 10 pressure-like associated with palpitations heart rate was up to 140s. Was short of breath no diaphoresis some nausea no vomiting. Patient came to the ED was in A. fib with RVR. SUBJECTIVE:   Patient progress reviewed.  The patient says her dyspnea is better , No dysuria    in the room   For placement of AICD 4/26    Physical Exam:    VITALS:  /70   Pulse 97   Temp 98.1 °F (36.7 °C) (Oral)   Resp 16   Ht 5' 8\" (1.727 m)   Wt 210 lb 4.8 oz (95.4 kg)   SpO2 92%   BMI 31.98 kg/m²   BLOOD PRESSURE RANGE:  Systolic (60WQY), ETB:491 , Min:102 , DRK:115   ; Diastolic (52OML), XPS:31, Min:63, Max:76    24HR INTAKE/OUTPUT:      Intake/Output Summary (Last 24 hours) at 4/25/2021 1054  Last data filed at 4/25/2021 0949  Gross per 24 hour   Intake 1632.36 ml   Output 2350 ml   Net -717.64 ml       Gen:  alert, oriented x 3  PERRL , EOM +  Pallor +, No icterus  JVP not raised   CV: IRRR , Gr 4/6 systolic murmur heard all over precordium   Lungs:B/ L air entry, Normal breath sounds , scattered  crackles at bases   Abd: soft, bowel sounds + , No organomegaly   Ext: 1+ edema, no cyanosis  No  Rash   Neuro: nonfocal.      DATA:    CBC with Differential:    Lab Results   Component Value Date    WBC 4.0 04/16/2021    RBC 3.68 04/16/2021    HGB 10.0 04/16/2021    HCT 31.6 04/16/2021     04/16/2021    MCV 85.8 04/16/2021    MCH 27.1 04/16/2021    MCHC 31.6 04/16/2021    RDW 16.8 04/16/2021    SEGSPCT 64.1 09/02/2020    BANDSPCT 1 01/14/2019    LYMPHOPCT 15.1 04/16/2021    MONOPCT 14.7 04/16/2021    BASOPCT 0.8 04/16/2021    MONOSABS 0.6 04/16/2021    LYMPHSABS 0.6 04/16/2021    EOSABS 0.4 04/16/2021    BASOSABS 0.0 04/16/2021     CMP:    Lab Results   Component Value Date     04/25/2021    K 3.7 04/25/2021    K 3.5 04/21/2021     04/25/2021    CO2 27 04/25/2021    BUN 29 04/25/2021    CREATININE 1.6 04/25/2021    GFRAA 38 04/25/2021    AGRATIO 0.8 04/15/2021    LABGLOM 31 04/25/2021    LABGLOM 45 12/06/2018    GLUCOSE 127 04/25/2021    PROT 6.6 04/15/2021    LABALBU 2.9 04/25/2021    CALCIUM 8.5 04/25/2021    BILITOT 0.6 04/15/2021    ALKPHOS 215 04/15/2021    AST 15 04/15/2021    ALT 10 04/15/2021     Magnesium:    Lab Results   Component Value Date    MG 1.90 04/25/2021     Phosphorus:    Lab Results   Component Value Date    PHOS 4.0 04/25/2021     Troponin:    Lab Results   Component Value Date    TROPONINI 0.02 04/15/2021     U/A:    Lab Results   Component Value Date    COLORU Yellow 04/15/2021    PROTEINU Negative 04/15/2021    PHUR 6.5 04/15/2021    WBCUA 3 04/15/2021    RBCUA 2 04/15/2021    YEAST neg 02/12/2021    BACTERIA trace 02/12/2021    BACTERIA 1+ 05/11/2020    CLARITYU Clear 04/15/2021    SPECGRAV 1.020 04/15/2021    LEUKOCYTESUR Negative 04/15/2021    UROBILINOGEN 0.2 04/15/2021    BILIRUBINUR Negative 04/15/2021    BLOODU TRACE-INTACT 04/15/2021    GLUCOSEU Negative 04/15/2021         IMPRESSION/RECOMMENDATIONS:      Diagnosis and Plan     1. TORIBIO   due to renal hypoperfusion secondary to hypotension as well as atrial fibrillation and diuretic use  Cr 1.6, stable     2. CKD stage 3   Presumptive  diabetic nephropathy, however no proteinuria    Baseline  creatinine 1.1    3. HTN     4. Afib with RVR    5.  sCHF    acute on chronic exacerbation, recent EF 10 to 15%   on milrinone and furosemide rell Toth

## 2021-04-25 NOTE — PROGRESS NOTES
Pt abdomen remains distended. Pt reports 2 episodes of emesis this evening with some pain/pressure relief. Patient may benefit from abdominal imaging.

## 2021-04-26 PROBLEM — E16.2 HYPOGLYCEMIA: Status: ACTIVE | Noted: 2021-04-26

## 2021-04-26 PROBLEM — I50.23 ACUTE ON CHRONIC SYSTOLIC CHF (CONGESTIVE HEART FAILURE) (HCC): Status: ACTIVE | Noted: 2021-04-26

## 2021-04-26 LAB
ALBUMIN SERPL-MCNC: 2.9 G/DL (ref 3.4–5)
ANION GAP SERPL CALCULATED.3IONS-SCNC: 10 MMOL/L (ref 3–16)
BUN BLDV-MCNC: 26 MG/DL (ref 7–20)
CALCIUM SERPL-MCNC: 8.5 MG/DL (ref 8.3–10.6)
CHLORIDE BLD-SCNC: 100 MMOL/L (ref 99–110)
CO2: 28 MMOL/L (ref 21–32)
CREAT SERPL-MCNC: 1.6 MG/DL (ref 0.6–1.2)
GFR AFRICAN AMERICAN: 38
GFR NON-AFRICAN AMERICAN: 31
GLUCOSE BLD-MCNC: 104 MG/DL (ref 70–99)
GLUCOSE BLD-MCNC: 119 MG/DL (ref 70–99)
GLUCOSE BLD-MCNC: 123 MG/DL (ref 70–99)
GLUCOSE BLD-MCNC: 140 MG/DL (ref 70–99)
GLUCOSE BLD-MCNC: 169 MG/DL (ref 70–99)
GLUCOSE BLD-MCNC: 99 MG/DL (ref 70–99)
INR BLD: 3.36 (ref 0.86–1.14)
MAGNESIUM: 1.8 MG/DL (ref 1.8–2.4)
PERFORMED ON: ABNORMAL
PERFORMED ON: NORMAL
PHOSPHORUS: 3.5 MG/DL (ref 2.5–4.9)
POTASSIUM SERPL-SCNC: 3.7 MMOL/L (ref 3.5–5.1)
PROTHROMBIN TIME: 39.4 SEC (ref 10–13.2)
SODIUM BLD-SCNC: 138 MMOL/L (ref 136–145)

## 2021-04-26 PROCEDURE — 85610 PROTHROMBIN TIME: CPT

## 2021-04-26 PROCEDURE — 2060000000 HC ICU INTERMEDIATE R&B

## 2021-04-26 PROCEDURE — 2580000003 HC RX 258: Performed by: INTERNAL MEDICINE

## 2021-04-26 PROCEDURE — 6370000000 HC RX 637 (ALT 250 FOR IP): Performed by: NURSE PRACTITIONER

## 2021-04-26 PROCEDURE — 83735 ASSAY OF MAGNESIUM: CPT

## 2021-04-26 PROCEDURE — 6370000000 HC RX 637 (ALT 250 FOR IP): Performed by: INTERNAL MEDICINE

## 2021-04-26 PROCEDURE — 99233 SBSQ HOSP IP/OBS HIGH 50: CPT | Performed by: NURSE PRACTITIONER

## 2021-04-26 PROCEDURE — 6360000002 HC RX W HCPCS: Performed by: INTERNAL MEDICINE

## 2021-04-26 PROCEDURE — 80069 RENAL FUNCTION PANEL: CPT

## 2021-04-26 PROCEDURE — 6360000002 HC RX W HCPCS: Performed by: NURSE PRACTITIONER

## 2021-04-26 PROCEDURE — 99233 SBSQ HOSP IP/OBS HIGH 50: CPT | Performed by: INTERNAL MEDICINE

## 2021-04-26 PROCEDURE — 6360000002 HC RX W HCPCS: Performed by: CLINICAL NURSE SPECIALIST

## 2021-04-26 PROCEDURE — 2580000003 HC RX 258: Performed by: NURSE PRACTITIONER

## 2021-04-26 PROCEDURE — 36415 COLL VENOUS BLD VENIPUNCTURE: CPT

## 2021-04-26 PROCEDURE — 2580000003 HC RX 258: Performed by: CLINICAL NURSE SPECIALIST

## 2021-04-26 RX ORDER — KETOTIFEN FUMARATE 0.35 MG/ML
1 SOLUTION/ DROPS OPHTHALMIC 2 TIMES DAILY
Status: DISCONTINUED | OUTPATIENT
Start: 2021-04-27 | End: 2021-04-27 | Stop reason: RX

## 2021-04-26 RX ADMIN — METOPROLOL SUCCINATE 25 MG: 25 TABLET, EXTENDED RELEASE ORAL at 08:33

## 2021-04-26 RX ADMIN — AMIODARONE HYDROCHLORIDE 400 MG: 200 TABLET ORAL at 08:32

## 2021-04-26 RX ADMIN — PANTOPRAZOLE SODIUM 40 MG: 40 TABLET, DELAYED RELEASE ORAL at 05:00

## 2021-04-26 RX ADMIN — OXYCODONE AND ACETAMINOPHEN 1 TABLET: 325; 10 TABLET ORAL at 05:00

## 2021-04-26 RX ADMIN — POTASSIUM CHLORIDE 40 MEQ: 1500 TABLET, EXTENDED RELEASE ORAL at 08:32

## 2021-04-26 RX ADMIN — INSULIN LISPRO 2 UNITS: 100 INJECTION, SOLUTION INTRAVENOUS; SUBCUTANEOUS at 17:34

## 2021-04-26 RX ADMIN — FUROSEMIDE 15 MG/HR: 10 INJECTION, SOLUTION INTRAMUSCULAR; INTRAVENOUS at 17:40

## 2021-04-26 RX ADMIN — POLYETHYLENE GLYCOL 3350 17 G: 17 POWDER, FOR SOLUTION ORAL at 21:46

## 2021-04-26 RX ADMIN — OXYCODONE AND ACETAMINOPHEN 1 TABLET: 325; 10 TABLET ORAL at 20:03

## 2021-04-26 RX ADMIN — MONTELUKAST SODIUM 10 MG: 10 TABLET, FILM COATED ORAL at 21:41

## 2021-04-26 RX ADMIN — OXYCODONE 5 MG: 5 TABLET ORAL at 16:18

## 2021-04-26 RX ADMIN — Medication 10 ML: at 08:36

## 2021-04-26 RX ADMIN — SPIRONOLACTONE 12.5 MG: 25 TABLET ORAL at 08:32

## 2021-04-26 RX ADMIN — POTASSIUM CHLORIDE 40 MEQ: 1500 TABLET, EXTENDED RELEASE ORAL at 16:18

## 2021-04-26 RX ADMIN — OXYCODONE 5 MG: 5 TABLET ORAL at 23:11

## 2021-04-26 RX ADMIN — OXYCODONE 5 MG: 5 TABLET ORAL at 08:47

## 2021-04-26 RX ADMIN — AMIODARONE HYDROCHLORIDE 400 MG: 200 TABLET ORAL at 21:41

## 2021-04-26 RX ADMIN — ASPIRIN 81 MG: 81 TABLET, CHEWABLE ORAL at 08:32

## 2021-04-26 RX ADMIN — MILRINONE LACTATE 0.2 MCG/KG/MIN: 200 INJECTION, SOLUTION INTRAVENOUS at 11:44

## 2021-04-26 RX ADMIN — Medication 10 ML: at 21:41

## 2021-04-26 RX ADMIN — OXYCODONE AND ACETAMINOPHEN 1 TABLET: 325; 10 TABLET ORAL at 11:44

## 2021-04-26 RX ADMIN — FUROSEMIDE 10 MG/HR: 10 INJECTION, SOLUTION INTRAMUSCULAR; INTRAVENOUS at 07:06

## 2021-04-26 ASSESSMENT — PAIN DESCRIPTION - DESCRIPTORS: DESCRIPTORS: ACHING

## 2021-04-26 ASSESSMENT — PAIN SCALES - GENERAL: PAINLEVEL_OUTOF10: 8

## 2021-04-26 ASSESSMENT — PAIN DESCRIPTION - PAIN TYPE: TYPE: CHRONIC PAIN

## 2021-04-26 NOTE — PROGRESS NOTES
[address]  HEART FAILURE  Progress Note      Admit Date 4/15/2021     Reason for Consult:      Reason for Consultation/Chief Complaint: chest pain    HPI:    Anthony Owusu is a 77 y.o. female with PMH CAD, CABG/Maze 2018, AF, WAYNE clip 2018, s/p Ablation, ILR, HTN, HLD, DVT, PE, ICM, HFrEF admitted with tachycardia, was in AF with RVR, dilt gtt started and unsuccessful CVx2. Echo with very low EF 10-15% and she was scheduled for ICD today but INR was elevated. She has been on lasix gtt and milrinone increased yesterday. 3L out this admit. Subjective:  Patient is being seen for CHF, ICM. There were no acute overnight cardiac events. Today Ms. Lisa Stewart c/o continue SOB, abd distension and edema. She denies CP or palpitations.        Baseline Weight: 198   Wt Readings from Last 3 Encounters:   04/26/21 210 lb 9.6 oz (95.5 kg)   04/13/21 205 lb (93 kg)   04/02/21 206 lb 9.6 oz (93.7 kg)          Objective:   /75   Pulse 97   Temp 97.8 °F (36.6 °C) (Oral)   Resp 18   Ht 5' 8\" (1.727 m)   Wt 210 lb 9.6 oz (95.5 kg)   SpO2 91%   BMI 32.02 kg/m²       Intake/Output Summary (Last 24 hours) at 4/26/2021 2751  Last data filed at 4/26/2021 1452  Gross per 24 hour   Intake 1214.56 ml   Output 2300 ml   Net -1085.44 ml      Wt Readings from Last 3 Encounters:   04/26/21 210 lb 9.6 oz (95.5 kg)   04/13/21 205 lb (93 kg)   04/02/21 206 lb 9.6 oz (93.7 kg)      In: 779.3 [P.O.:660; I.V.:119.3]  Out: 1450       Physical Exam:  General Appearance:  Non-obese/Well Nourished  Respiratory:  · Resp Auscultation: bilateral crackles  Cardiovascular:  · Auscultation: Regular rate and rhythm, normal S1S2, no m/g/r/c  · Palpation: Normal    · JVD: positive  · Pedal Pulses: 2+ and equal   Abdomen:   distended, firm,   Extremities:  · No Cyanosis or Clubbing  · Extremities: 2+ edema  Neurological/Psychiatric:  · Oriented to time, place, and person  · Non-anxious    MEDICATIONS:   Scheduled Meds:   Scheduled Meds:   lidocaine 1 % injection  5 mL Intradermal Once    sodium chloride flush  5-40 mL Intravenous 2 times per day    warfarin (COUMADIN) daily dosing (placeholder)   Other RX Placeholder    spironolactone  12.5 mg Oral Daily    potassium chloride  40 mEq Oral BID WC    metoprolol succinate  25 mg Oral Daily    aspirin  81 mg Oral Daily    gabapentin  300 mg Oral Nightly    montelukast  10 mg Oral Nightly    pantoprazole  40 mg Oral QAM AC    sodium chloride flush  5-40 mL Intravenous 2 times per day    insulin lispro  0-12 Units Subcutaneous TID WC    insulin lispro  0-6 Units Subcutaneous Nightly    amiodarone  400 mg Oral BID     Continuous Infusions:   sodium chloride      furosemide (LASIX) 1mg/ml infusion 10 mg/hr (04/26/21 0706)    milrinone 0.2 mcg/kg/min (04/25/21 1647)    sodium chloride      dextrose       PRN Meds:.diphenhydrAMINE, oxyCODONE-acetaminophen, sodium chloride flush, sodium chloride, oxyCODONE, albuterol, sodium chloride flush, sodium chloride, promethazine **OR** ondansetron, polyethylene glycol, acetaminophen **OR** acetaminophen, glucose, dextrose, glucagon (rDNA), dextrose  Continuous Infusions:   sodium chloride      furosemide (LASIX) 1mg/ml infusion 10 mg/hr (04/26/21 0706)    milrinone 0.2 mcg/kg/min (04/25/21 1647)    sodium chloride      dextrose         Intake/Output Summary (Last 24 hours) at 4/26/2021 0936  Last data filed at 4/26/2021 0836  Gross per 24 hour   Intake 1214.56 ml   Output 2300 ml   Net -1085.44 ml       Lab Data:  CBC:   Lab Results   Component Value Date    WBC 4.0 04/16/2021    HGB 10.0 04/16/2021     04/16/2021     BMP:  Lab Results   Component Value Date     04/26/2021    K 3.7 04/26/2021    K 3.5 04/21/2021     04/26/2021    CO2 28 04/26/2021    BUN 26 04/26/2021    CREATININE 1.6 04/26/2021    GLUCOSE 104 04/26/2021     INR:   Lab Results   Component Value Date    INR 3.36 04/26/2021    INR 2.94 04/25/2021    INR 2.99 04/24/2021        CARDIAC LABS  ENZYMES:No results for input(s): CKMB, CKMBINDEX, TROPONINI in the last 72 hours. Invalid input(s): CKTOTAL;3  FASTING LIPID PANEL:  Lab Results   Component Value Date    HDL 40 12/23/2020    LDLDIRECT 127 08/21/2018    LDLCALC 97 12/23/2020    TRIG 94 03/03/2021    TSH 1.90 04/01/2021     LIVER PROFILE:  Lab Results   Component Value Date    AST 15 04/15/2021    AST 16 04/15/2021    ALT 10 04/15/2021    ALT 11 04/15/2021     BNP:   Lab Results   Component Value Date    PROBNP 6,712 04/22/2021    PROBNP 15,113 04/18/2021    PROBNP 8,766 04/15/2021     Iron Studies:    Lab Results   Component Value Date    FERRITIN 799.9 10/05/2018    FERRITIN 416.5 08/30/2018     Lab Results   Component Value Date    IRON 49 11/30/2020    TIBC 310 11/30/2020    FERRITIN 799.9 (H) 10/05/2018         1. WEIGHT: Admit Weight: 203 lb 4 oz (92.2 kg)      Today  Weight: 210 lb 9.6 oz (95.5 kg)   2. I/O     Intake/Output Summary (Last 24 hours) at 4/26/2021 0936  Last data filed at 4/26/2021 0836  Gross per 24 hour   Intake 1214.56 ml   Output 2300 ml   Net -1085.44 ml       Cardiac Testing:   Echo 4/20/21  Summary   Overall left ventricular systolic function is severely depressed .   Ejection fraction is visually estimated to be 10-15 %.  E/e'= 23.2 GLS=-3.4   Moderately dilated left ventricle.   There is severe diffuse hypokinesis.   Indeterminate diastolic function.   A bioprosthetic mitral valve is well seated with peak velocity of 1.59m/s   and a mean gradient of 3mmHg.   The left atrium is moderately dilated.   Mild aortic stenosis with a peak velocity of 2.5m/s and a mean pressure   gradient of 14mmHg.   The aortic valve is thickened/calcified with decreased leaflet mobility   consistent with aortic stenosis.   Mild to moderate tricuspid regurgitation.   Estimated pulmonary artery systolic pressure is mildly to moderately   elevated at 46 mmHg assuming a right atrial pressure of 8 mmHg     11/30/2020 Dobutamine Echo   Dobutamine echocardiogram for aortic stenosis.   Very technically limited exam due to atrial fibrillation.   Baseline echocardiogram shows severe left ventricular dysfunction with   anterior, lateral and apical hypokinesis with an ejection fraction of 20-25%.   There is no improvement in wall motion with low or high dose dobutamine   suggestive of nonviable myocardium.      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 After dobutamine stress the mean AV   gradient rises to 20mmHg with 20mcg of dobutamine consistent with mild to   moderate aortic stenosis.  Prosthetic mitral valve with a mean gradient of 7mmHg       Assessment/Plan:     1. AHF- still overloaded, increase lasix gtt to 15, continue milrinone and richard, consider paracentesis  2. ICM- ICD postponed for INR, continue toprol and richard, no ACE/ARB for TORIBIO  3.  AF- on Amio, AC on hold for elevated INR          I appreciate the opportunity of cooperating in the care of this individual.    Claudette Poles, [facility], [address] 4/26/2021, 9:36 AM  Heart Failure  The [address], [address]  Ph: [phone]      Core Measures:   · Discharge instructions:   · LVEF documented:   · ACEI for LV dysfunction:   · Smoking Cessation:

## 2021-04-26 NOTE — PROGRESS NOTES
[facility]   Electrophysiology Progress Note     Admit Date: 4/15/2021     Reason for follow up: Cardiomyopathy    HPI and Interval History: 77 y.o. female past medical history significant for CAD/CABG in 2018, PAF with WAYNE clip on 8/18 and maze procedure in 2018, ablation of atrial fibrillation, status post ILR, hypertension, hyperlipidemia, DVT/PE on Coumadin and systolic heart failure with severe LV dysfunction. She was admitted with palpitation tachycardia, found to be in atrial fibrillation. Treated initially with medication. Cardioversion was tried which was unsuccessful. She has been treated with amiodarone. She has history of severe LV dysfunction. Her ejection fraction on 11/30/2020 was 20%. She has been treated with medical therapy. Repeat echocardiography with ejection fraction of 10 to 15%. Patient seen and examined. Clinical notes reviewed. Telemetry reviewed. No new complaint today. No major events overnight. Denies having chest pain, shortness of breath, dyspnea on exertion, Orthopnea, PND at the time of this visit. Assessment and plan:     - Ischemic cardiomyopathy, severe LV systolic function with EF: 10-15%, NYHAFC III, Stage C on medical therapy     Patient has been on guideline directed medical therapy    Not on ACE-I/ARB due to CKD and renal failure    Patient has a resonable expectation of survival of more than one year. Patient is a candidate for AICD implantation for primary prevention. I have discussed the procedure, risks, benefits and alternatives in detail with patient and family. Patient verbalized understanding and would like to proceed with device implantation. INR is 3.36    Warfarin is on hold. Continue with aggressive medical therapy. Will postpone AICD due to high INR   Hold warfarin. She is also being diuresed by heart failure and on Milrinone. - Acute on chronic systolic heart failure: On IV milrinone and diuretics. Heart failure to follow. Plan for weaning off milrinone per heart failure. - Persistent atrial fibrillation:    Being loaded with amiodarone. Failed cardioversion. Failed ablation. Plan for repeat cardioversion after fully loaded with amiodarone. S/p WAYNE clip and MAZE     - History of DVT/PE    She is on coumadin for DVT/PE   On hold and INR is elevated due to amiodarone. - AS    Moderate AS,    Seen by IC and follow up with valve clinic.     - CAD:    S/p CABG   Stable. Discussed with nursing staff. Active Hospital Problems    Diagnosis Date Noted    S/P CABG x 3 [Z95.1] 08/22/2018     Priority: Low    PAF (paroxysmal atrial fibrillation) ([facility]) [I48.0]      Priority: Low    Type 2 diabetes mellitus with hyperglycemia, with long-term current use of insulin ([facility]) [E11.65, Z79.4] 04/27/2017     Priority: Low    Acute on chronic systolic CHF (congestive heart failure) ([facility]) [I50.23] 04/26/2021    Hypoglycemia [E16.2] 04/26/2021    Atrial fibrillation with rapid ventricular response ([facility]) [I48.91]     Chest pain [R07.9]     Dyspnea on exertion [R06.00]     Acute kidney injury superimposed on CKD ([facility] [address] [N17.9, N18.9]     Stage 3 chronic kidney disease [N18.30]     Coronary artery disease involving native heart without angina pectoris [I25.10]     Persistent atrial fibrillation ([address] [I48.19] 10/30/2019    S/P MVR (mitral valve repair) [Z98.890]     Chronic systolic CHF (congestive heart failure), NYHA class 3 ([address] [I50.22] 01/15/2019    Obesity, Class I, BMI 30-34.9 [E66.9]        Diagnostic studies:     Echo 4/20/21  Summary   Overall left ventricular systolic function is severely depressed .   Ejection fraction is visually estimated to be 10-15 %.  E/e'= 23.2 GLS=-3.4   Moderately dilated left ventricle.   There is severe diffuse hypokinesis.   Indeterminate diastolic function.   A bioprosthetic mitral valve is well seated with peak velocity of 1.59m/s   and a mean gradient of 3mmHg.   The left atrium is moderately dilated.   Mild aortic stenosis with a peak velocity of 2.5m/s and a mean pressure   gradient of 14mmHg.   The aortic valve is thickened/calcified with decreased leaflet mobility   consistent with aortic stenosis.   Mild to moderate tricuspid regurgitation.   Estimated pulmonary artery systolic pressure is mildly to moderately   elevated at 46 mmHg assuming a right atrial pressure of 8 mmHg     11/30/2020 Dobutamine Echo   Dobutamine echocardiogram for aortic stenosis.   Very technically limited exam due to atrial fibrillation.   Baseline echocardiogram shows severe left ventricular dysfunction with   anterior, lateral and apical hypokinesis with an ejection fraction of 20-25%.   There is no improvement in wall motion with low or high dose dobutamine   suggestive of nonviable myocardium.      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 After dobutamine stress the mean AV   gradient rises to 20mmHg with 20mcg of dobutamine consistent with mild to   moderate aortic stenosis.  Prosthetic mitral valve with a mean gradient of 7mmHg        JUDE 10/21/2020  Mildly dilated left ventricular size and normal wall thickness. Global left ventricular function is severely decreased with ejection fraction estimated at 25%. Patient is in atrial fibrillation during procedure.      The bioprosthetic mitral valve is well seated with a mean gradient of 5mmHg and maximum pressure gradient of 15 mmHg with heart rate of 89 bpm. Pressure half time of 82 ms. There is trivial mitral regurgitation.      Left atrial enlargement. Spontaneous echo contrast seen in the left atrium. A large stump of the left atrial appendage with no thrombus noted. Patient has history of surgical ligation of the left atrial appendage.  There are spontaneous echo contrast in the atrial appendage.      The aortic valve is thickened/calcified with decreased leaflet mobility consistent with aortic stenosis. There is trivial aortic insufficiency.      There is moderate tricuspid regurgitation.     ECHO 9/15/20  Left ventricular cavity size is dilated. There is normal wall thickness with a moderately severe reduction in   systolic function.   LV ejection fraction is visually estimated to be 25-30%.   Indeterminate diastolic function.   The right ventricle is not well visualized but appears to be normal in size with moderately reduced function.   The left atrium is moderately dilated.   A bioprosthetic mitral valve with a mean gradient of 7 mmHg. This may be a normal gradient for this valve but could suggest mild stenosis.   Trivial mitral regurgitation.   The aortic valve is thickened/calcified with a mean gradient of 16mmHg consistent with at least mild aortic stenosis. This is likely underestimated due to low cardiac output secondary to LV dysfunction.   No significant aortic valve regurgitation.   Moderate tricuspid regurgitation with RVSP of 48 mmHg. I independently reviewed the cardiac diagnostic studies, ECG and relevant imaging studies. Physical Examination:  Vitals:    21 0759   BP: 125/75   Pulse: 97   Resp: 18   Temp: 97.8 °F (36.6 °C)   SpO2: 91%      In: 779.3 [P.O.:660; I.V.:119.3]  Out: 1450    Wt Readings from Last 3 Encounters:   21 210 lb 9.6 oz (95.5 kg)   21 205 lb (93 kg)   21 206 lb 9.6 oz (93.7 kg)     Temp  Av.8 °F (36.6 °C)  Min: 97.3 °F (36.3 °C)  Max: 98.5 °F (36.9 °C)  Pulse  Av.4  Min: 86  Max: 97  BP  Min: 112/71  Max: 125/75  SpO2  Av.7 %  Min: 91 %  Max: 94 %    Intake/Output Summary (Last 24 hours) at 2021 1120  Last data filed at 2021 0836  Gross per 24 hour   Intake 959.28 ml   Output 1800 ml   Net -840.72 ml       I independently reviewed all cardiac tracing from cardiac telemetry. · Constitutional: Oriented. No distress. · Head: Normocephalic and atraumatic. · Mouth/Throat: Oropharynx is clear and moist.   · Eyes: Conjunctivae normal. EOM are normal.   · Neck: Neck supple. No JVD present. · Cardiovascular: Normal rate, regular rhythm, A4&T9.  + systolic murmur   · Pulmonary/Chest: Bilateral respiratory sounds. No rhonchi. · Abdominal: Soft. No tenderness. · Musculoskeletal: No tenderness. ++ edema    · Lymphadenopathy: Has no cervical adenopathy. · Neurological: Alert and oriented. Follows command, No Gross deficit   · Skin: Skin is warm, No rash noted. · Psychiatric: Has a normal behavior     Scheduled Meds:   lidocaine 1 % injection  5 mL Intradermal Once    sodium chloride flush  5-40 mL Intravenous 2 times per day    warfarin (COUMADIN) daily dosing (placeholder)   Other RX Placeholder    spironolactone  12.5 mg Oral Daily    potassium chloride  40 mEq Oral BID WC    metoprolol succinate  25 mg Oral Daily    aspirin  81 mg Oral Daily    gabapentin  300 mg Oral Nightly    montelukast  10 mg Oral Nightly    pantoprazole  40 mg Oral QAM AC    sodium chloride flush  5-40 mL Intravenous 2 times per day    insulin lispro  0-12 Units Subcutaneous TID WC    insulin lispro  0-6 Units Subcutaneous Nightly    amiodarone  400 mg Oral BID     Continuous Infusions:   sodium chloride      furosemide (LASIX) 1mg/ml infusion 10 mg/hr (04/26/21 0706)    milrinone 0.2 mcg/kg/min (04/25/21 1647)    sodium chloride      dextrose       PRN Meds:diphenhydrAMINE, oxyCODONE-acetaminophen, sodium chloride flush, sodium chloride, oxyCODONE, albuterol, sodium chloride flush, sodium chloride, promethazine **OR** ondansetron, polyethylene glycol, acetaminophen **OR** acetaminophen, glucose, dextrose, glucagon (rDNA), dextrose     Prior to Admission medications    Medication Sig Start Date End Date Taking?  Authorizing Provider   spironolactone (ALDACTONE) 25 MG tablet Take 2 tablets by mouth daily 4/13/21  Yes SOLEDAD Taylor - CNS   torsemide (DEMADEX) 20 MG tablet 40mg morning, 40mg afternoon, 20mg evening 4/12/21  Yes Mohamud De Anda MD   carvedilol (COREG) 6.25 MG tablet Take 1 tablet by mouth daily 4/2/21  Yes Mohamud De Anda MD   gabapentin (NEURONTIN) 300 MG capsule Take 1 capsule by mouth nightly for 90 days. Intended supply: 30 days 3/12/21 6/10/21 Yes Keren Aleman MD   insulin glargine (LANTUS SOLOSTAR) 100 UNIT/ML injection pen INJECT 26 UNITS IN THE MORNING AND 18 UNITS AT BEDTIME 3/10/21  Yes Amada Olsen MD   exenatide (BYETTA 10 MCG PEN) 10 MCG/0.04ML injection Inject 0.04 mLs into the skin 2 times daily (with meals) 3/3/21  Yes Keren Aleman MD   OXYCODONE HCL PO Take 10 mg by mouth As of 1/21/2021,  states patient is taking 10mg with edge being taken off her pain after 3 hours.    Yes Historical Provider, MD   ACETAMINOPHEN PO Take 500 mg by mouth every 6 hours as needed    Yes Historical Provider, MD   albuterol sulfate  (90 Base) MCG/ACT inhaler USE 2 INHALATIONS EVERY 6 HOURS AS NEEDED FOR WHEEZING 11/17/20  Yes Keren Aleman MD   albuterol (PROVENTIL) (2.5 MG/3ML) 0.083% nebulizer solution Take 3 mLs by nebulization every 6 hours as needed for Wheezing 6/2/20  Yes Keren Aleman MD   polyethylene glycol (GLYCOLAX) 17 GM/SCOOP powder Take 17 g by mouth every other day    Yes Historical Provider, MD   omeprazole (PRILOSEC) 20 MG delayed release capsule Take 20 mg by mouth daily   Yes Historical Provider, MD   ondansetron (ZOFRAN) 4 MG tablet Take 1 tablet by mouth 3 times daily as needed for Nausea or Vomiting 3/26/20  Yes Keren Aleman MD   aspirin 81 MG chewable tablet Take 1 tablet by mouth daily 6/7/19  Yes Keren Aleman MD   vitamin B-12 500 MCG tablet Take 1 tablet by mouth daily 10/12/18  Yes Nishant Marcial MD   potassium chloride (KLOR-CON M) 10 MEQ extended release tablet TAKE 1 TABLET DAILY 4/21/21   Keren Aleman MD   predniSONE (DELTASONE) 10 MG tablet TAKE 1 TABLET AS NEEDED (EOSINOPHILIC GASATRITIS) 4/28/72   Kelly Simms MD   montelukast (SINGULAIR) 10 MG tablet TAKE 1 TABLET NIGHTLY 4/21/21   Kelly Simms MD   warfarin (COUMADIN) 5 MG tablet TAKE 1 TABLET DAILY 4/21/21   Kelly Simms MD   blood glucose test strips (FREESTYLE LITE) strip USE TO TEST FOUR TIMES A DAY 1/5/21   Kelly Simms MD   FreeStyle Lancets MISC 1 each by Does not apply route 4 times daily 4/29/20   Kelly Simms MD   Blood Glucose Monitoring Suppl ([employer] PRO GLUCOSE METER) GAETANO 1 Device by Does not apply route 4 times daily 2/4/20   Kelly Simms MD   HUMALOG KWIKPEN 100 UNIT/ML SOPN TAKE AS INSTRUCTED BY YOUR PRESCRIBER  Patient taking differently: Only if high blood sugar-has not been using 12/30/19   Kelly Simms MD   nystatin (MYCOSTATIN) 865901 UNIT/ML suspension TAKE ONE TEASPOONFUL BY MOUTH FOUR TIMES A DAY FOR 5 DAYS 11/20/19   Kelly Simms MD   BD PEN NEEDLE JANNET U/F 32G X 4 MM MISC USE 1 PEN NEEDLE FOUR TIMES A DAY 3/22/19   Kelly Simms MD       Review of System:  [x] Full ROS obtained and negative except as mentioned in HPI    Relevant and available labs, and cardiovascular diagnostics reviewed. Reviewed. Recent Labs     04/24/21  0449 04/25/21  0524 04/26/21  0431   * 137 138   K 3.6 3.7 3.7   CL 99 100 100   CO2 24 27 28   PHOS 3.2 4.0 3.5   BUN 28* 29* 26*   CREATININE 1.6* 1.6* 1.6*     No results for input(s): WBC, HGB, HCT, MCV, PLT in the last 72 hours. Estimated Creatinine Clearance: 37 mL/min (A) (based on SCr of 1.6 mg/dL (H)). Lab Results   Component Value Date     09/02/2020     11/27/2018       I independently reviewed all cardiac tracing from cardiac telemetry. I independently reviewed relevant and available cardiac diagnostic tests ECG, CXR, Echo, Stress test, Device interrogation, Holter, CT scan.    Complex medical condition with multiple medical problems affecting prognosis and outcome of EP interventions  Severe exacerbation of underlying medical condition requiring hospitalization and at risk of decompensation. Thank you for allowing me to participate in the care of Michelle Romero     All questions and concerns were addressed to the patient/family. Alternatives to my treatment were discussed. I have discussed the above stated plan and the patient verbalized understanding and agreed with the plan. NOTE: This report was transcribed using voice recognition software. Every effort was made to ensure accuracy, however, inadvertent computerized transcription errors may be present.      Digna Menjivar MD, MPH  [place] [address]   Office: [phone]  Fax: [phone]

## 2021-04-26 NOTE — PROGRESS NOTES
HOSPITALISTS PROGRESS NOTE    4/26/2021 8:40 AM        Name: Army Goltz . Admitted: 4/15/2021  Primary Care Provider: Marylene Hartigan, MD (Tel: [phone])      Problem List  Principal Problem:    Acute on chronic systolic CHF (congestive heart failure) ([facility] [address]  Active Problems:    Type 2 diabetes mellitus with hyperglycemia, with long-term current use of insulin (HCC)    PAF (paroxysmal atrial fibrillation) (HCC)    S/P CABG x 3    Obesity, Class I, BMI 30-34.9    Chronic systolic CHF (congestive heart failure), NYHA class 3 (HCC)    Persistent atrial fibrillation (HCC)    S/P MVR (mitral valve repair)    Stage 3 chronic kidney disease    Coronary artery disease involving native heart without angina pectoris    Atrial fibrillation with rapid ventricular response (HCC)    Chest pain    Dyspnea on exertion    Acute kidney injury superimposed on CKD ([facility] [address]    Hypoglycemia  Resolved Problems:    * No resolved hospital problems. *       Assessment & Plan:   Await AICD as per cardiology  Patient still appears in fluid overload on Milrinone and Lasix gtts    Acute on chronic systolic heart failure  Lasix and milrinone drip, nephrology and cardiology consulted, Lasix drip rate remains at 10     Persistent atrial fibrillation  History of left atrial appendage clipping, failed cardioversion, holding Coumadin for ICD and continue amiodarone    TORIBIO on CKD  Better creatinine, nephrology following  Worsening renal function, adjustment of diuretic drip as per nephrology and cardiology    Diabetes mellitus uncontrolled with hypoglycemia during the stay  DC Lantus and preprandial humalog given hypoglycemia    IV Access: Peripheral  Henry: No  Diet: DIET CARDIAC; Low Sodium (2 GM); Daily Fluid Restriction: 2000 ml  Code:Prior  DVT PPX SCD  Disposition Home with home PT/OT/VNS/HHA/SW    Discussed with patient and nursing.     D/W  at bedside. ICD per EP. Hopefully back home in next 48-72 hrs. Brief Course:    Patient admitted on 15 April with chest pain and back pain found to have A. fib with RVR along with heart failure. EF of about 15%. On milrinone and Lasix drip. Patient getting possible AICD during the stay. But sugar slightly on the lower side during the stay had to cut back on the Lantus dosing. CC: Fluid overload    Subjective:  . Patient able to lay in bed. Still with BL LE edema. No CP, HA or abdominal pain. Improving SOB. -5.4 L since admission.     Current Medications  diphenhydrAMINE (BENADRYL) tablet 25 mg, Q6H PRN  oxyCODONE-acetaminophen (PERCOCET)  MG per tablet 1 tablet, Q6H PRN  lidocaine PF 1 % injection 5 mL, Once  sodium chloride flush 0.9 % injection 5-40 mL, 2 times per day  sodium chloride flush 0.9 % injection 5-40 mL, PRN  0.9 % sodium chloride infusion, PRN  warfarin (COUMADIN) daily dosing (placeholder), RX Placeholder  insulin glargine (LANTUS;BASAGLAR) injection pen 15 Units, Daily  spironolactone (ALDACTONE) tablet 12.5 mg, Daily  oxyCODONE (ROXICODONE) immediate release tablet 5 mg, Q4H PRN  potassium chloride (KLOR-CON M) extended release tablet 40 mEq, BID WC  furosemide (LASIX) 100 mg in dextrose 5 % 100 mL infusion, Continuous  milrinone (PRIMACOR) 20 mg in dextrose 5 % 100 mL infusion, Continuous  insulin lispro (1 Unit Dial) 3 Units, TID WC  metoprolol succinate (TOPROL XL) extended release tablet 25 mg, Daily  albuterol (PROVENTIL) nebulizer solution 2.5 mg, Q6H PRN  aspirin chewable tablet 81 mg, Daily  gabapentin (NEURONTIN) capsule 300 mg, Nightly  montelukast (SINGULAIR) tablet 10 mg, Nightly  pantoprazole (PROTONIX) tablet 40 mg, QAM AC  sodium chloride flush 0.9 % injection 5-40 mL, 2 times per day  sodium chloride flush 0.9 % injection 5-40 mL, PRN  0.9 % sodium chloride infusion, PRN  promethazine (PHENERGAN) tablet 12.5 mg, Q6H PRN    Or  ondansetron (ZOFRAN) injection 4 mg, Q6H PRN  polyethylene glycol (GLYCOLAX) packet 17 g, Daily PRN  acetaminophen (TYLENOL) tablet 650 mg, Q6H PRN    Or  acetaminophen (TYLENOL) suppository 650 mg, Q6H PRN  insulin lispro (1 Unit Dial) 0-12 Units, TID WC  insulin lispro (1 Unit Dial) 0-6 Units, Nightly  glucose (GLUTOSE) 40 % oral gel 15 g, PRN  dextrose 50 % IV solution, PRN  glucagon (rDNA) injection 1 mg, PRN  dextrose 5 % solution, PRN  amiodarone (CORDARONE) tablet 400 mg, BID        Objective:  /75   Pulse 97   Temp 97.8 °F (36.6 °C) (Oral)   Resp 18   Ht 5' 8\" (1.727 m)   Wt 210 lb 9.6 oz (95.5 kg)   SpO2 91%   BMI 32.02 kg/m²     Intake/Output Summary (Last 24 hours) at 4/26/2021 0840  Last data filed at 4/26/2021 0836  Gross per 24 hour   Intake 1214.56 ml   Output 2300 ml   Net -1085.44 ml      Wt Readings from Last 3 Encounters:   04/26/21 210 lb 9.6 oz (95.5 kg)   04/13/21 205 lb (93 kg)   04/02/21 206 lb 9.6 oz (93.7 kg)   2-3+ BL LE edema  Obese  General appearance:  Appears comfortable  Eyes: Sclera clear. Pupils equal.  ENT: Moist oral mucosa. Trachea midline, no adenopathy. Cardiovascular: Regular rhythm, normal S1, S2. No murmur. Respiratory: Not using accessory muscles. Good inspiratory effort. Clear to auscultation bilaterally, no wheeze or crackles. GI: Abdomen soft, no tenderness, not distended, normal bowel sounds  Musculoskeletal: No cyanosis in digits, neck supple  Neurology: CN 2-12 grossly intact. No speech or motor deficits  Psych: Normal affect. Alert and oriented in time, place and person  Skin: Warm, dry, normal turgor  Extremity exam shows brisk capillary refill. Peripheral pulses are palpable in lower extremities     Labs and Tests:  CBC: No results for input(s): WBC, HGB, PLT in the last 72 hours.   BMP:    Recent Labs     04/24/21  0449 04/25/21  0524 04/26/21  0431   * 137 138   K 3.6 3.7 3.7   CL 99 100 100   CO2 24 27 28   BUN 28* 29* 26*   CREATININE 1.6* 1.6* 1.6*   GLUCOSE 140* 127* 104*     Hepatic: No results for input(s): AST, ALT, ALB, BILITOT, ALKPHOS in the last 72 hours. XR CHEST PORTABLE   Final Result   No acute cardiopulmonary disease. Stable cardiomegaly with no evidence of   overt failure.                Chavo Soliman MD   4/26/2021 8:40 AM

## 2021-04-26 NOTE — ACP (ADVANCE CARE PLANNING)
Advanced Care Planning Note. Purpose of Encounter: Advanced care planning in light of acute on chronic systolic CHF  Parties In Attendance: Patient,   Decisional Capacity: Yes  Subjective: Patient with BL LE edema and SOB  Objective: Cr 1.6  Goals of Care Determination: Patient wants full support (CPR, vent, surgery, HD, trach, PEG)  Plan:  Lasix and Milrinone gtt. Cardio and Renal consults  Code Status: Full code   Time spent on Advanced care Plannin minutes  Advanced Care Planning Documents: Completed advanced directives on chart,  is the POA.     Ivy Cheng MD  2021 8:40 AM

## 2021-04-26 NOTE — PROGRESS NOTES
Nephrology Attending  Progress Note        SUMMARY :  We are following this patient for TORIBIO on CKD stage 3   77 y.o. female who yesterday night started to have midsternal chest pain that was 7 out of 10 pressure-like associated with palpitations heart rate was up to 140s. Was short of breath no diaphoresis some nausea no vomiting. Patient came to the ED was in A. fib with RVR. SUBJECTIVE:   Patient progress reviewed.  The patient says her dyspnea is better , No dysuria    in the room   AICD placement postponed due to high PT ( INR 3.4)     Physical Exam:    VITALS:  /73   Pulse 90   Temp 98.3 °F (36.8 °C) (Oral)   Resp 18   Ht 5' 8\" (1.727 m)   Wt 210 lb 9.6 oz (95.5 kg)   SpO2 93%   BMI 32.02 kg/m²   BLOOD PRESSURE RANGE:  Systolic (26MJV), HFC:441 , Min:112 , ECB:709   ; Diastolic (75ANF), SHAGUFTA:14, Min:70, Max:76    24HR INTAKE/OUTPUT:      Intake/Output Summary (Last 24 hours) at 4/26/2021 1141  Last data filed at 4/26/2021 1132  Gross per 24 hour   Intake 1491.28 ml   Output 2800 ml   Net -1308.72 ml       Gen:  alert, oriented x 3  PERRL , EOM +  Pallor +, No icterus  JVP not raised   CV: IRRR , Gr 4/6 systolic murmur heard all over precordium   Lungs:B/ L air entry, Normal breath sounds ,  crackles at bases   Abd: soft, bowel sounds + , No organomegaly   Ext: 1+ edema, no cyanosis  No  Rash   Neuro: nonfocal.      DATA:    CBC with Differential:    Lab Results   Component Value Date    WBC 4.0 04/16/2021    RBC 3.68 04/16/2021    HGB 10.0 04/16/2021    HCT 31.6 04/16/2021     04/16/2021    MCV 85.8 04/16/2021    MCH 27.1 04/16/2021    MCHC 31.6 04/16/2021    RDW 16.8 04/16/2021    SEGSPCT 64.1 09/02/2020    BANDSPCT 1 01/14/2019    LYMPHOPCT 15.1 04/16/2021    MONOPCT 14.7 04/16/2021    BASOPCT 0.8 04/16/2021    MONOSABS 0.6 04/16/2021    LYMPHSABS 0.6 04/16/2021    EOSABS 0.4 04/16/2021    BASOSABS 0.0 04/16/2021     CMP:    Lab Results   Component Value Date     04/26/2021    K 3.7 04/26/2021    K 3.5 04/21/2021     04/26/2021    CO2 28 04/26/2021    BUN 26 04/26/2021    CREATININE 1.6 04/26/2021    GFRAA 38 04/26/2021    AGRATIO 0.8 04/15/2021    LABGLOM 31 04/26/2021    LABGLOM 45 12/06/2018    GLUCOSE 104 04/26/2021    PROT 6.6 04/15/2021    LABALBU 2.9 04/26/2021    CALCIUM 8.5 04/26/2021    BILITOT 0.6 04/15/2021    ALKPHOS 215 04/15/2021    AST 15 04/15/2021    ALT 10 04/15/2021     Magnesium:    Lab Results   Component Value Date    MG 1.80 04/26/2021     Phosphorus:    Lab Results   Component Value Date    PHOS 3.5 04/26/2021     Troponin:    Lab Results   Component Value Date    TROPONINI 0.02 04/15/2021     U/A:    Lab Results   Component Value Date    COLORU Yellow 04/15/2021    PROTEINU Negative 04/15/2021    PHUR 6.5 04/15/2021    WBCUA 3 04/15/2021    RBCUA 2 04/15/2021    YEAST neg 02/12/2021    BACTERIA trace 02/12/2021    BACTERIA 1+ 05/11/2020    CLARITYU Clear 04/15/2021    SPECGRAV 1.020 04/15/2021    LEUKOCYTESUR Negative 04/15/2021    UROBILINOGEN 0.2 04/15/2021    BILIRUBINUR Negative 04/15/2021    BLOODU TRACE-INTACT 04/15/2021    GLUCOSEU Negative 04/15/2021         IMPRESSION/RECOMMENDATIONS:      Diagnosis and Plan     1. TORIBIO   due to renal hypoperfusion secondary to hypotension as well as atrial fibrillation and diuretic use  Cr 1.6, stable     2. CKD stage 3   Presumptive  diabetic nephropathy, however no proteinuria    Baseline  creatinine 1.1    3. HTN     4. Afib with RVR    5.  sCHF    acute on chronic exacerbation, recent EF 10 to 15%        Cathie Found

## 2021-04-27 LAB
ALBUMIN SERPL-MCNC: 2.9 G/DL (ref 3.4–5)
ANION GAP SERPL CALCULATED.3IONS-SCNC: 11 MMOL/L (ref 3–16)
BASOPHILS ABSOLUTE: 0 K/UL (ref 0–0.2)
BASOPHILS RELATIVE PERCENT: 0.5 %
BUN BLDV-MCNC: 26 MG/DL (ref 7–20)
CALCIUM SERPL-MCNC: 8.4 MG/DL (ref 8.3–10.6)
CHLORIDE BLD-SCNC: 99 MMOL/L (ref 99–110)
CO2: 26 MMOL/L (ref 21–32)
CREAT SERPL-MCNC: 1.3 MG/DL (ref 0.6–1.2)
EOSINOPHILS ABSOLUTE: 0.2 K/UL (ref 0–0.6)
EOSINOPHILS RELATIVE PERCENT: 4.4 %
GFR AFRICAN AMERICAN: 48
GFR NON-AFRICAN AMERICAN: 40
GLUCOSE BLD-MCNC: 132 MG/DL (ref 70–99)
GLUCOSE BLD-MCNC: 133 MG/DL (ref 70–99)
GLUCOSE BLD-MCNC: 142 MG/DL (ref 70–99)
GLUCOSE BLD-MCNC: 152 MG/DL (ref 70–99)
GLUCOSE BLD-MCNC: 95 MG/DL (ref 70–99)
HCT VFR BLD CALC: 30.9 % (ref 36–48)
HEMOGLOBIN: 9.7 G/DL (ref 12–16)
INR BLD: 2.85 (ref 0.86–1.14)
LYMPHOCYTES ABSOLUTE: 0.6 K/UL (ref 1–5.1)
LYMPHOCYTES RELATIVE PERCENT: 12.8 %
MAGNESIUM: 1.8 MG/DL (ref 1.8–2.4)
MCH RBC QN AUTO: 26.8 PG (ref 26–34)
MCHC RBC AUTO-ENTMCNC: 31.5 G/DL (ref 31–36)
MCV RBC AUTO: 84.9 FL (ref 80–100)
MONOCYTES ABSOLUTE: 0.8 K/UL (ref 0–1.3)
MONOCYTES RELATIVE PERCENT: 17.1 %
NEUTROPHILS ABSOLUTE: 3.1 K/UL (ref 1.7–7.7)
NEUTROPHILS RELATIVE PERCENT: 65.2 %
PDW BLD-RTO: 17.4 % (ref 12.4–15.4)
PERFORMED ON: ABNORMAL
PHOSPHORUS: 3.6 MG/DL (ref 2.5–4.9)
PLATELET # BLD: 227 K/UL (ref 135–450)
PMV BLD AUTO: 7.4 FL (ref 5–10.5)
POTASSIUM SERPL-SCNC: 3.2 MMOL/L (ref 3.5–5.1)
PRO-BNP: ABNORMAL PG/ML (ref 0–449)
PROTHROMBIN TIME: 33.4 SEC (ref 10–13.2)
RBC # BLD: 3.64 M/UL (ref 4–5.2)
SODIUM BLD-SCNC: 136 MMOL/L (ref 136–145)
WBC # BLD: 4.7 K/UL (ref 4–11)

## 2021-04-27 PROCEDURE — 6360000002 HC RX W HCPCS: Performed by: NURSE PRACTITIONER

## 2021-04-27 PROCEDURE — 2580000003 HC RX 258: Performed by: INTERNAL MEDICINE

## 2021-04-27 PROCEDURE — 6370000000 HC RX 637 (ALT 250 FOR IP): Performed by: INTERNAL MEDICINE

## 2021-04-27 PROCEDURE — 85610 PROTHROMBIN TIME: CPT

## 2021-04-27 PROCEDURE — 2580000003 HC RX 258: Performed by: NURSE PRACTITIONER

## 2021-04-27 PROCEDURE — 2060000000 HC ICU INTERMEDIATE R&B

## 2021-04-27 PROCEDURE — 85025 COMPLETE CBC W/AUTO DIFF WBC: CPT

## 2021-04-27 PROCEDURE — 6360000002 HC RX W HCPCS: Performed by: INTERNAL MEDICINE

## 2021-04-27 PROCEDURE — 97530 THERAPEUTIC ACTIVITIES: CPT

## 2021-04-27 PROCEDURE — 6370000000 HC RX 637 (ALT 250 FOR IP): Performed by: NURSE PRACTITIONER

## 2021-04-27 PROCEDURE — 94760 N-INVAS EAR/PLS OXIMETRY 1: CPT

## 2021-04-27 PROCEDURE — 36415 COLL VENOUS BLD VENIPUNCTURE: CPT

## 2021-04-27 PROCEDURE — 6370000000 HC RX 637 (ALT 250 FOR IP): Performed by: PHYSICIAN ASSISTANT

## 2021-04-27 PROCEDURE — 99233 SBSQ HOSP IP/OBS HIGH 50: CPT | Performed by: NURSE PRACTITIONER

## 2021-04-27 PROCEDURE — 99232 SBSQ HOSP IP/OBS MODERATE 35: CPT | Performed by: NURSE PRACTITIONER

## 2021-04-27 PROCEDURE — 97165 OT EVAL LOW COMPLEX 30 MIN: CPT

## 2021-04-27 PROCEDURE — 83735 ASSAY OF MAGNESIUM: CPT

## 2021-04-27 PROCEDURE — 83880 ASSAY OF NATRIURETIC PEPTIDE: CPT

## 2021-04-27 PROCEDURE — 80069 RENAL FUNCTION PANEL: CPT

## 2021-04-27 PROCEDURE — 97161 PT EVAL LOW COMPLEX 20 MIN: CPT

## 2021-04-27 RX ORDER — SODIUM PHOSPHATE, DIBASIC AND SODIUM PHOSPHATE, MONOBASIC 7; 19 G/133ML; G/133ML
1 ENEMA RECTAL
Status: COMPLETED | OUTPATIENT
Start: 2021-04-27 | End: 2021-04-27

## 2021-04-27 RX ORDER — POLYETHYLENE GLYCOL 3350 17 G/17G
17 POWDER, FOR SOLUTION ORAL 2 TIMES DAILY
Status: DISCONTINUED | OUTPATIENT
Start: 2021-04-27 | End: 2021-05-05 | Stop reason: HOSPADM

## 2021-04-27 RX ORDER — WARFARIN SODIUM 2.5 MG/1
2.5 TABLET ORAL DAILY
Status: DISCONTINUED | OUTPATIENT
Start: 2021-04-27 | End: 2021-04-28

## 2021-04-27 RX ORDER — POTASSIUM CHLORIDE 20 MEQ/1
40 TABLET, EXTENDED RELEASE ORAL
Status: COMPLETED | OUTPATIENT
Start: 2021-04-27 | End: 2021-04-27

## 2021-04-27 RX ORDER — BISACODYL 10 MG
10 SUPPOSITORY, RECTAL RECTAL DAILY PRN
Status: DISCONTINUED | OUTPATIENT
Start: 2021-04-27 | End: 2021-05-05 | Stop reason: HOSPADM

## 2021-04-27 RX ADMIN — INSULIN LISPRO 2 UNITS: 100 INJECTION, SOLUTION INTRAVENOUS; SUBCUTANEOUS at 12:02

## 2021-04-27 RX ADMIN — Medication 10 ML: at 02:44

## 2021-04-27 RX ADMIN — METOPROLOL SUCCINATE 25 MG: 25 TABLET, EXTENDED RELEASE ORAL at 08:43

## 2021-04-27 RX ADMIN — MILRINONE LACTATE 0.2 MCG/KG/MIN: 200 INJECTION, SOLUTION INTRAVENOUS at 22:53

## 2021-04-27 RX ADMIN — POLYETHYLENE GLYCOL 3350 17 G: 17 POWDER, FOR SOLUTION ORAL at 14:29

## 2021-04-27 RX ADMIN — WARFARIN SODIUM 2.5 MG: 2.5 TABLET ORAL at 18:37

## 2021-04-27 RX ADMIN — NAPHAZOLINE HYDROCHLORIDE AND PHENIRAMINE MALEATE 1 DROP: .25; 3 SOLUTION/ DROPS OPHTHALMIC at 20:09

## 2021-04-27 RX ADMIN — OXYCODONE 5 MG: 5 TABLET ORAL at 20:06

## 2021-04-27 RX ADMIN — FUROSEMIDE 15 MG/HR: 10 INJECTION, SOLUTION INTRAMUSCULAR; INTRAVENOUS at 02:39

## 2021-04-27 RX ADMIN — DIPHENHYDRAMINE HYDROCHLORIDE 25 MG: 25 TABLET ORAL at 08:43

## 2021-04-27 RX ADMIN — FUROSEMIDE 15 MG/HR: 10 INJECTION, SOLUTION INTRAMUSCULAR; INTRAVENOUS at 09:57

## 2021-04-27 RX ADMIN — SODIUM PHOSPHATE 1 ENEMA: 7; 19 ENEMA RECTAL at 17:45

## 2021-04-27 RX ADMIN — ONDANSETRON 4 MG: 2 INJECTION INTRAMUSCULAR; INTRAVENOUS at 19:53

## 2021-04-27 RX ADMIN — AMIODARONE HYDROCHLORIDE 400 MG: 200 TABLET ORAL at 20:06

## 2021-04-27 RX ADMIN — POTASSIUM CHLORIDE 40 MEQ: 1500 TABLET, EXTENDED RELEASE ORAL at 08:43

## 2021-04-27 RX ADMIN — PANTOPRAZOLE SODIUM 40 MG: 40 TABLET, DELAYED RELEASE ORAL at 08:43

## 2021-04-27 RX ADMIN — BISACODYL 10 MG: 10 SUPPOSITORY RECTAL at 14:29

## 2021-04-27 RX ADMIN — OXYCODONE 5 MG: 5 TABLET ORAL at 06:41

## 2021-04-27 RX ADMIN — OXYCODONE AND ACETAMINOPHEN 1 TABLET: 325; 10 TABLET ORAL at 18:43

## 2021-04-27 RX ADMIN — OXYCODONE AND ACETAMINOPHEN 1 TABLET: 325; 10 TABLET ORAL at 11:13

## 2021-04-27 RX ADMIN — Medication 10 ML: at 19:53

## 2021-04-27 RX ADMIN — POTASSIUM CHLORIDE 40 MEQ: 1500 TABLET, EXTENDED RELEASE ORAL at 18:37

## 2021-04-27 RX ADMIN — OXYCODONE AND ACETAMINOPHEN 1 TABLET: 325; 10 TABLET ORAL at 02:06

## 2021-04-27 RX ADMIN — ASPIRIN 81 MG: 81 TABLET, CHEWABLE ORAL at 08:44

## 2021-04-27 RX ADMIN — SPIRONOLACTONE 12.5 MG: 25 TABLET ORAL at 08:44

## 2021-04-27 RX ADMIN — NAPHAZOLINE HYDROCHLORIDE AND PHENIRAMINE MALEATE 1 DROP: .25; 3 SOLUTION/ DROPS OPHTHALMIC at 11:13

## 2021-04-27 RX ADMIN — Medication 10 ML: at 22:54

## 2021-04-27 RX ADMIN — MILRINONE LACTATE 0.2 MCG/KG/MIN: 200 INJECTION, SOLUTION INTRAVENOUS at 04:56

## 2021-04-27 RX ADMIN — FUROSEMIDE 15 MG/HR: 10 INJECTION, SOLUTION INTRAMUSCULAR; INTRAVENOUS at 02:34

## 2021-04-27 RX ADMIN — FUROSEMIDE 15 MG/HR: 10 INJECTION, SOLUTION INTRAMUSCULAR; INTRAVENOUS at 16:44

## 2021-04-27 RX ADMIN — AMIODARONE HYDROCHLORIDE 400 MG: 200 TABLET ORAL at 08:44

## 2021-04-27 RX ADMIN — POTASSIUM CHLORIDE 40 MEQ: 1500 TABLET, EXTENDED RELEASE ORAL at 11:13

## 2021-04-27 RX ADMIN — POTASSIUM CHLORIDE 40 MEQ: 1500 TABLET, EXTENDED RELEASE ORAL at 09:52

## 2021-04-27 RX ADMIN — MONTELUKAST SODIUM 10 MG: 10 TABLET, FILM COATED ORAL at 20:06

## 2021-04-27 RX ADMIN — DIPHENHYDRAMINE HYDROCHLORIDE 25 MG: 25 TABLET ORAL at 01:13

## 2021-04-27 ASSESSMENT — PAIN DESCRIPTION - ORIENTATION: ORIENTATION: RIGHT

## 2021-04-27 ASSESSMENT — PAIN SCALES - GENERAL
PAINLEVEL_OUTOF10: 7
PAINLEVEL_OUTOF10: 8
PAINLEVEL_OUTOF10: 0
PAINLEVEL_OUTOF10: 8

## 2021-04-27 ASSESSMENT — PAIN DESCRIPTION - DESCRIPTORS: DESCRIPTORS: SHARP

## 2021-04-27 ASSESSMENT — PAIN DESCRIPTION - PAIN TYPE
TYPE: ACUTE PAIN
TYPE: CHRONIC PAIN

## 2021-04-27 NOTE — PROGRESS NOTES
[address]   Electrophysiology Progress Note   Date: 4/27/2021  Admit Date: 4/15/2021     Reason for follow up: Atrial fibrillation RVR    Chief Complaint:   Chief Complaint   Patient presents with    Chest Pain    Back Pain     History of Present Illness: History obtained from patient and medical record. Tootie Edwards is a 77 y.o. female with a past medical history of HTN, HLD, DM, CAD s/p CABG x 3, S/p bioprosthetic MVR, WAYNE clip (8/2018), afib s/p Maze 2018, CMP (EF25-30%) DVT/PE on warfarin and sCHF follows with HF. S/p ablation of afib w/ PVI, roofline, posterior, CTI flutter 10/30/2019. S/p ILR. Hx of Mobitz I AVB and prolonged IA interval. S/p JUDE/CV. Has been treated with amiodarone stopped in 2018. Presented with palpitations and tachycardia, found to be in afib/RVR and started on diltiazem gtt. Reportedly her carvedilol was reduced recently. Interval Hx: Today, she is being seen for follow up. Remains in afib/CVR 's. Remains on milrinone IV and and lasix gtt. No new complaints today. No major events overnight. Denies having chest pain, palpitations, or dizziness. Patient seen and examined. Clinical notes reviewed. Telemetry reviewed.     Problem List:   Patient Active Problem List    Diagnosis Date Noted    Acute on chronic systolic CHF (congestive heart failure) ([facility] [address] 04/26/2021    Hypoglycemia 04/26/2021    Atrial fibrillation with rapid ventricular response (HCC)     Chest pain     Dyspnea on exertion     Acute kidney injury superimposed on CKD ([address]     Hypotension     Acute on chronic systolic heart failure due to valvular disease ([address] 02/26/2021    Stage 3 chronic kidney disease     Coronary artery disease involving native heart without angina pectoris     Deep vein thrombosis (DVT) of non-extremity vein 12/15/2020    Aortic valve stenosis 11/03/2020    Pneumatosis intestinalis of small intestine 05/10/2020    Ischemic cardiomyopathy 01/20/2020    Occlusion and stenosis of bilateral carotid arteries 01/20/2020    [facility] 10/30/2019    S/P MVR (mitral valve repair)     Thoracic degenerative disc disease 06/07/2019    Chronic systolic CHF (congestive heart failure), NYHA class 3 (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    Goiter 09/07/2018    S/P CABG x 3 08/22/2018    Coronary artery disease due to lipid rich plaque     Nonrheumatic mitral valve regurgitation     Encounter for loop recorder check 08/16/2018    Cardiac arrhythmia     Pneumoperitoneum 07/28/2018    PAF ([facility]oxysmal [facility] fibrillation) [facility]     Hypertension     Asthma 01/12/2018    Type 2 diabetes mellitus with hyperglycemia, with long-term current use of insulin (HCC) 04/27/2017    Primary osteoarthritis of both knees 03/21/2017    Multiple pulmonary nodules 08/01/2016    History of pulmonary embolism     B12 deficiency 09/08/2014    Paroxysmal SVT (supraventricular tachycardia) ([facility] 23/09/4749    Eosinophilic gastritis 02/54/3265    Generalized osteoarthrosis, involving multiple sites 03/25/2013    Long term current use of anticoagulant 12/19/2012    Hypercholesteremia 12/19/2012      Assessment and Plan:  Persistent Atrial Fibrillation RVR   - Remains in afib/RVR   - On Coreg and amiodarone 400 mg bid    ~ Reduce amiodarone 200 mg bid x 7 days and then daily     ~ TSH and LFT OK   - S/p WAYNE clip   - Failed cardioversion 4/20/2021  Acute on Chronic systolic CHF   - HF following   - Remains on IV milrinone  Cardiomyopathy   - She has been treated with HF on OMT   - EF worsening 10-15% per echo this admission   - AICD implantation has been discussed and can be completed as OP when optimized from HF perspective. If scheduling allows we can consider as IP prior to dischrge  CAD   - Stable   - No reports of angina   - Continue medical therapy  HTN   - Controlled.  No medication changes, BP A DAY  Margy Hinojosa MD      Scheduled Meds:   potassium chloride  40 mEq Oral Q1H    naphazoline-pheniramine  1 drop Both Eyes BID    lidocaine 1 % injection  5 mL Intradermal Once    sodium chloride flush  5-40 mL Intravenous 2 times per day    warfarin (COUMADIN) daily dosing (placeholder)   Other RX Placeholder    spironolactone  12.5 mg Oral Daily    potassium chloride  40 mEq Oral BID WC    metoprolol succinate  25 mg Oral Daily    aspirin  81 mg Oral Daily    gabapentin  300 mg Oral Nightly    montelukast  10 mg Oral Nightly    pantoprazole  40 mg Oral QAM AC    sodium chloride flush  5-40 mL Intravenous 2 times per day    insulin lispro  0-12 Units Subcutaneous TID WC    insulin lispro  0-6 Units Subcutaneous Nightly    amiodarone  400 mg Oral BID     Continuous Infusions:   sodium chloride      furosemide (LASIX) 1mg/ml infusion 15 mg/hr (21 0957)    milrinone 0.2 mcg/kg/min (21 0651)    sodium chloride      dextrose       PRN Meds:diphenhydrAMINE, oxyCODONE-acetaminophen, sodium chloride flush, sodium chloride, oxyCODONE, albuterol, sodium chloride flush, sodium chloride, promethazine **OR** ondansetron, polyethylene glycol, acetaminophen **OR** acetaminophen, glucose, dextrose, glucagon (rDNA), dextrose   Past Medical History:  Past Medical History:   Diagnosis Date    Asthma     Atrial fibrillation (HCC)     Eosinophilia     Hemoptysis     HIGH CHOLESTEROL     Hx of blood clots     Hypertension     Irregular heart beat     Other specified gastritis without mention of hemorrhage     Palpitations     Skin cancer     basal and squamous    Type II or unspecified type diabetes mellitus without mention of complication, not stated as uncontrolled         Past Surgical History:    has a past surgical history that includes Cholecystectomy;  section; Colonoscopy (2017); skin biopsy; bronchoscopy (2016);  Coronary artery bypass graft (2018); Mitral valve replacement (08/21/2018); transesophageal echocardiogram (08/21/2018); Tunneled venous catheter placement (Left, 08/23/2018); Cardiac catheterization (08/16/2018); Insertable Cardiac Monitor (Left, 08/16/2018); Colonoscopy (01/17/2014); Hysterectomy; Upper gastrointestinal endoscopy (N/A, 10/10/2018); Colonoscopy (10/10/2018); Colonoscopy (N/A, 8/7/2020); Pain management procedure (N/A, 12/18/2020); and Pain management procedure (Bilateral, 1/18/2021). Social History:  Reviewed. reports that she has never smoked. She has never used smokeless tobacco. She reports that she does not drink alcohol or use drugs. Family History:  Reviewed. family history includes Asthma in her mother; Cancer in her father; Heart Disease in her mother; High Blood Pressure in her mother; Hypertension in her mother. Denies family history of sudden cardiac death, arrhythmia, premature CAD    Review of Systems:  · Constitutional: Negative for fever, weight changes, or weakness  · Skin: Negative for bruising, bleeding, blood clots, or changes in skin pigment  · HEENT: Negative for vision changes or dysphagia  · Respiratory: Reviewed in HPI  · Cardiovascular: Reviewed in HPI  · Gastrointestinal: Negative for abdominal pain, N/V/D, constipation, or black/tarry stools  · Genito-Urinary: Negative for hematuria  · Musculoskeletal: No focal weakness  · Neurological/Psych: Negative for confusion or TIA-like symptoms. No anxiety, depression, or insomnia    Physical Examination:  Vitals:    04/27/21 0831   BP: (!) 147/80   Pulse: 100   Resp: 16   Temp: 98.3 °F (36.8 °C)   SpO2: 95%      In: 1058.8 [P.O.:414;  I.V.:644.8]  Out: 2175    Wt Readings from Last 3 Encounters:   04/27/21 210 lb 11.2 oz (95.6 kg)   04/13/21 205 lb (93 kg)   04/02/21 206 lb 9.6 oz (93.7 kg)       Intake/Output Summary (Last 24 hours) at 4/27/2021 1003  Last data filed at 4/27/2021 0836  Gross per 24 hour   Intake 1590.79 ml   Output 3175 ml   Net -1584.21 INR 2.85 2021     EC/15/2021: afib/RVR    ECHO:  2021  Summary   Overall left ventricular systolic function is severely depressed . Ejection fraction is visually estimated to be 10-15 %. E/e'= 23.2 GLS=-3.4   Moderately dilated left ventricle. There is severe diffuse hypokinesis. Indeterminate diastolic function. A bioprosthetic mitral valve is well seated with peak velocity of 1.59m/s   and a mean gradient of 3mmHg. The left atrium is moderately dilated. Mild aortic stenosis with a peak velocity of 2.5m/s and a mean pressure gradient of 14mmHg. The aortic valve is thickened/calcified with decreased leaflet mobility   consistent with aortic stenosis. Mild to moderate tricuspid regurgitation. Estimated pulmonary artery systolic pressure is mildly to moderately elevated at 46 mmHg assuming a right atrial pressure of 8 mmHg. 2020  Summary   Dobutamine echocardiogram for aortic stenosis. Very technically limited exam due to atrial fibrillation. Baseline echocardiogram shows severe left ventricular dysfunction with   anterior, lateral and apical hypokinesis with an ejection fraction of 20-25   %. There is no improvement in wall motion with low or high dose dobutamine   suggestive of nonviable myocardium. Mild prosthetic aortic valve stenosis with a mean gradient of 16mmHg and   peak velocity of 2.47m/s at rest. After dobutamine stress the mean AV   gradient rises to 20mmHg with 20mcg of dobutamine consistent with mild to   moderate aortic stenosis. Prosthetic mitral valve with a mean gradient of 7mmHg  9/15/2020  Summary   Left ventricular cavity size is dilated. There is normal wall thickness with a moderately severe reduction in   systolic function. LV ejection fraction is visually estimated to be 25-30%. Indeterminate diastolic function. The right ventricle is not well visualized but appears to be normal in size   with moderately reduced function.    The left atrium is moderately dilated. A bioprosthetic mitral valve with a mean gradient of 7 mmHg. This may be a   normal gradient for this valve but could suggest mild stenosis. Trivial mitral regurgitation. The aortic valve is thickened/calcified with a mean gradient of 16 mmHg   consistent with at least mild aortic stenosis. This is likely underestimated   due to low cardiac output secondary to LV dysfunction. No significant aortic valve regurgitation. Moderate tricuspid regurgitation with RVSP of 48 mmHg. Stress Test: 8/7/2018  Summary    Small sized lateral completely reversible defect of mild intensity    consistent with ischemia in the territory of the LCx and/or LAD .    Normal LV function.    Overall findings represent a intermediate risk scan.         All questions and concerns were addressed to the patient. Alternatives to my treatment were discussed. I have discussed the above stated plan with patient and the nurse. The patient verbalized understanding and agreed with the plan. Thank you for allowing to us to participate in the care of Orlin Bailey.     SOLEDAD Nielsen-CNP  [facility]   Office: [phone]

## 2021-04-27 NOTE — PROGRESS NOTES
Nephrology Attending  Progress Note        SUMMARY :  We are following this patient for TORIBIO on CKD stage 3   77 y.o. female who yesterday night started to have midsternal chest pain that was 7 out of 10 pressure-like associated with palpitations heart rate was up to 140s. Was short of breath no diaphoresis some nausea no vomiting. Patient came to the ED was in A. fib with RVR. SUBJECTIVE:   Patient progress reviewed.  The patient says her dyspnea is better , No dysuria    in the room   AICD placement postponed due to high PT ( INR 2.85 )     Physical Exam:    VITALS:  BP (!) 147/80   Pulse 100   Temp 98.3 °F (36.8 °C) (Oral)   Resp 16   Ht 5' 8\" (1.727 m)   Wt 210 lb 11.2 oz (95.6 kg)   SpO2 95%   BMI 32.04 kg/m²   BLOOD PRESSURE RANGE:  Systolic (76HLM), EZD:141 , Min:101 , OXN:577   ; Diastolic (09EDP), SRQ:54, Min:67, Max:80    24HR INTAKE/OUTPUT:      Intake/Output Summary (Last 24 hours) at 4/27/2021 1037  Last data filed at 4/27/2021 1034  Gross per 24 hour   Intake 1656.57 ml   Output 3175 ml   Net -1518.43 ml       Gen:  alert, oriented x 3  PERRL , EOM +  Pallor +, No icterus  JVP not raised   CV: IRRR , Gr 4/6 systolic murmur heard all over precordium   Lungs:B/ L air entry, Normal breath sounds ,  crackles at bases   Abd: soft, bowel sounds + , No organomegaly   Ext: 1+ edema, no cyanosis  No  Rash   Neuro: nonfocal.      DATA:    CBC with Differential:    Lab Results   Component Value Date    WBC 4.7 04/27/2021    RBC 3.64 04/27/2021    HGB 9.7 04/27/2021    HCT 30.9 04/27/2021     04/27/2021    MCV 84.9 04/27/2021    MCH 26.8 04/27/2021    MCHC 31.5 04/27/2021    RDW 17.4 04/27/2021    SEGSPCT 64.1 09/02/2020    BANDSPCT 1 01/14/2019    LYMPHOPCT 12.8 04/27/2021    MONOPCT 17.1 04/27/2021    BASOPCT 0.5 04/27/2021    MONOSABS 0.8 04/27/2021    LYMPHSABS 0.6 04/27/2021    EOSABS 0.2 04/27/2021    BASOSABS 0.0 04/27/2021     CMP:    Lab Results   Component Value Date

## 2021-04-27 NOTE — PLAN OF CARE
Medicated for pain. Alternating Oxycodone and Percocet. Last dose at 0200. Resting quietly. Will continue to monitor.     Problem: Pain:  Goal: Control of acute pain  Description: Control of acute pain  Outcome: Ongoing     Problem: Cardiovascular  Goal: No DVT, peripheral vascular complications  Outcome: Ongoing  Goal: Hemodynamic stability  Outcome: Ongoing  Goal: Anticoagulate/Hct stable  Outcome: Ongoing     Problem: Respiratory  Goal: No pulmonary complications  Outcome: Ongoing  Goal: O2 Sat > 90%  Outcome: Ongoing     Problem: Serum Glucose Level - Abnormal:  Goal: Ability to maintain appropriate glucose levels has stabilized  Description: Ability to maintain appropriate glucose levels has stabilized  Outcome: Ongoing

## 2021-04-27 NOTE — PROGRESS NOTES
potassium chloride  40 mEq Oral Q1H    naphazoline-pheniramine  1 drop Both Eyes BID    lidocaine 1 % injection  5 mL Intradermal Once    sodium chloride flush  5-40 mL Intravenous 2 times per day    warfarin (COUMADIN) daily dosing (placeholder)   Other RX Placeholder    spironolactone  12.5 mg Oral Daily    potassium chloride  40 mEq Oral BID WC    metoprolol succinate  25 mg Oral Daily    aspirin  81 mg Oral Daily    gabapentin  300 mg Oral Nightly    montelukast  10 mg Oral Nightly    pantoprazole  40 mg Oral QAM AC    sodium chloride flush  5-40 mL Intravenous 2 times per day    insulin lispro  0-12 Units Subcutaneous TID WC    insulin lispro  0-6 Units Subcutaneous Nightly    amiodarone  400 mg Oral BID     Continuous Infusions:   sodium chloride      furosemide (LASIX) 1mg/ml infusion 15 mg/hr (04/27/21 0651)    milrinone 0.2 mcg/kg/min (04/27/21 0651)    sodium chloride      dextrose       PRN Meds:.diphenhydrAMINE, oxyCODONE-acetaminophen, sodium chloride flush, sodium chloride, oxyCODONE, albuterol, sodium chloride flush, sodium chloride, promethazine **OR** ondansetron, polyethylene glycol, acetaminophen **OR** acetaminophen, glucose, dextrose, glucagon (rDNA), dextrose  Continuous Infusions:   sodium chloride      furosemide (LASIX) 1mg/ml infusion 15 mg/hr (04/27/21 0651)    milrinone 0.2 mcg/kg/min (04/27/21 0651)    sodium chloride      dextrose         Intake/Output Summary (Last 24 hours) at 4/27/2021 0930  Last data filed at 4/27/2021 0836  Gross per 24 hour   Intake 1590.79 ml   Output 3175 ml   Net -1584.21 ml       Lab Data:  CBC:   Lab Results   Component Value Date    WBC 4.7 04/27/2021    HGB 9.7 04/27/2021     04/27/2021     BMP:  Lab Results   Component Value Date     04/27/2021    K 3.2 04/27/2021    K 3.5 04/21/2021    CL 99 04/27/2021    CO2 26 04/27/2021    BUN 26 04/27/2021    CREATININE 1.3 04/27/2021    GLUCOSE 95 04/27/2021     INR:   Lab Results   Component Value Date    INR 2.85 04/27/2021    INR 3.36 04/26/2021    INR 2.94 04/25/2021        CARDIAC LABS  ENZYMES:No results for input(s): CKMB, CKMBINDEX, TROPONINI in the last 72 hours. Invalid input(s): CKTOTAL;3  FASTING LIPID PANEL:  Lab Results   Component Value Date    HDL 40 12/23/2020    LDLDIRECT 127 08/21/2018    LDLCALC 97 12/23/2020    TRIG 94 03/03/2021    TSH 1.90 04/01/2021     LIVER PROFILE:  Lab Results   Component Value Date    AST 15 04/15/2021    AST 16 04/15/2021    ALT 10 04/15/2021    ALT 11 04/15/2021     BNP:   Lab Results   Component Value Date    PROBNP 6,712 04/22/2021    PROBNP 15,113 04/18/2021    PROBNP 8,766 04/15/2021     Iron Studies:    Lab Results   Component Value Date    FERRITIN 799.9 10/05/2018    FERRITIN 416.5 08/30/2018     Lab Results   Component Value Date    IRON 49 11/30/2020    TIBC 310 11/30/2020    FERRITIN 799.9 (H) 10/05/2018         1. WEIGHT: Admit Weight: 203 lb 4 oz (92.2 kg)      Today  Weight: 210 lb 11.2 oz (95.6 kg)   2. I/O     Intake/Output Summary (Last 24 hours) at 4/27/2021 0930  Last data filed at 4/27/2021 0836  Gross per 24 hour   Intake 1590.79 ml   Output 3175 ml   Net -1584.21 ml       Cardiac Testing:   Echo 4/20/21  Summary   Overall left ventricular systolic function is severely depressed .   Ejection fraction is visually estimated to be 10-15 %.  E/e'= 23.2 GLS=-3.4   Moderately dilated left ventricle.   There is severe diffuse hypokinesis.   Indeterminate diastolic function.   A bioprosthetic mitral valve is well seated with peak velocity of 1.59m/s   and a mean gradient of 3mmHg.   The left atrium is moderately dilated.   Mild aortic stenosis with a peak velocity of 2.5m/s and a mean pressure   gradient of 14mmHg.   The aortic valve is thickened/calcified with decreased leaflet mobility   consistent with aortic stenosis.   Mild to moderate tricuspid regurgitation.   Estimated pulmonary artery systolic pressure is mildly to

## 2021-04-27 NOTE — PROGRESS NOTES
Clinical Pharmacy Note: Warfarin    Natalee Izaguirre is a 77 y.o. female  is receiving warfarin for Afib and history of DVT. INR (no units)   Date Value   04/27/2021 2.85 (H)   04/26/2021 3.36 (H)   04/25/2021 2.94 (H)     Prior to admission warfarin dosing regimen: 2.5 mg MON; 5 mg all other days  Warfarin was on hold for supra- therapeutic INR. Plan: Restart warfarin at 2.5mg daily. Daily INR is ordered. Will continue to monitor. Per pharmacy consult.   Sri Blancas PharmD 4/27/2021

## 2021-04-27 NOTE — PROGRESS NOTES
HOSPITALISTS PROGRESS NOTE    4/27/2021 1:10 PM        Name: Orlin Bailey . Admitted: 4/15/2021  Primary Care Provider: Saroj Diaz MD (Tel: [phone])      Problem List  Principal Problem:    Acute on chronic systolic CHF (congestive heart failure) ([facility] [address]  Active Problems:    Type 2 diabetes mellitus with hyperglycemia, with long-term current use of insulin (HCC)    PAF (paroxysmal atrial fibrillation) (HCC)    S/P CABG x 3    Obesity, Class I, BMI 30-34.9    Chronic systolic CHF (congestive heart failure), NYHA class 3 (HCC)    Persistent atrial fibrillation (HCC)    S/P MVR (mitral valve repair)    Stage 3 chronic kidney disease    Coronary artery disease involving native heart without angina pectoris    Atrial fibrillation with rapid ventricular response (HCC)    Chest pain    Dyspnea on exertion    Acute kidney injury superimposed on CKD ([facility] [address]    Hypoglycemia  Resolved Problems:    * No resolved hospital problems. *       Assessment & Plan:   Await AICD as per cardiology  Patient remains fluid overloaded on Milrinone and Lasix gtts    Acute on chronic systolic heart failure  Lasix and milrinone drip, nephrology and cardiology consulted, Lasix drip rate now 15 mg/hr    Persistent atrial fibrillation  History of left atrial appendage clipping, failed cardioversion, holding Coumadin for ICD and continue amiodarone    TORIBIO on CKD  Better creatinine, nephrology following  Improving renal function, adjustment of diuretic drip as per nephrology and cardiology    Diabetes mellitus uncontrolled with hypoglycemia during the stay  Hold Lantus and preprandial humalog given hypoglycemia    IV Access: Peripheral  Henry: No  Diet: DIET CARDIAC; Low Sodium (2 GM); Daily Fluid Restriction: 2000 ml  Code:Full Code  DVT PPX SCD  Disposition Home with home PT/OT/VNS/HHA/SW    Discussed with patient, nursing and CM.     D/W  at bedside. ICD per EP. Hopefully back home in next 48-72 hrs. Brief Course:    Patient admitted on 15 April with chest pain and back pain found to have A. fib with RVR along with heart failure. EF of about 15%. On milrinone and Lasix drip. Patient getting possible AICD during the stay. But sugar slightly on the lower side during the stay had to cut back on the Lantus dosing. CC: Fluid overload    Subjective:  . Patient sitting in chair. Ongoing BL LE edema. No CP, HA or abdominal pain. Improving SOB. -6.4 L since admission.     Current Medications  naphazoline-pheniramine (NAPHCON-A) 0.025-0.3 % ophthalmic solution 1 drop, BID  warfarin (COUMADIN) tablet 2.5 mg, Daily  diphenhydrAMINE (BENADRYL) tablet 25 mg, Q6H PRN  oxyCODONE-acetaminophen (PERCOCET)  MG per tablet 1 tablet, Q6H PRN  lidocaine PF 1 % injection 5 mL, Once  sodium chloride flush 0.9 % injection 5-40 mL, 2 times per day  sodium chloride flush 0.9 % injection 5-40 mL, PRN  0.9 % sodium chloride infusion, PRN  spironolactone (ALDACTONE) tablet 12.5 mg, Daily  oxyCODONE (ROXICODONE) immediate release tablet 5 mg, Q4H PRN  potassium chloride (KLOR-CON M) extended release tablet 40 mEq, BID WC  furosemide (LASIX) 100 mg in dextrose 5 % 100 mL infusion, Continuous  milrinone (PRIMACOR) 20 mg in dextrose 5 % 100 mL infusion, Continuous  metoprolol succinate (TOPROL XL) extended release tablet 25 mg, Daily  albuterol (PROVENTIL) nebulizer solution 2.5 mg, Q6H PRN  aspirin chewable tablet 81 mg, Daily  gabapentin (NEURONTIN) capsule 300 mg, Nightly  montelukast (SINGULAIR) tablet 10 mg, Nightly  pantoprazole (PROTONIX) tablet 40 mg, QAM AC  sodium chloride flush 0.9 % injection 5-40 mL, 2 times per day  sodium chloride flush 0.9 % injection 5-40 mL, PRN  0.9 % sodium chloride infusion, PRN  promethazine (PHENERGAN) tablet 12.5 mg, Q6H PRN    Or  ondansetron (ZOFRAN) injection 4 mg, Q6H PRN  polyethylene glycol (GLYCOLAX) packet 17 g, Daily PRN  acetaminophen (TYLENOL) tablet 650 mg, Q6H PRN    Or  acetaminophen (TYLENOL) suppository 650 mg, Q6H PRN  insulin lispro (1 Unit Dial) 0-12 Units, TID WC  insulin lispro (1 Unit Dial) 0-6 Units, Nightly  glucose (GLUTOSE) 40 % oral gel 15 g, PRN  dextrose 50 % IV solution, PRN  glucagon (rDNA) injection 1 mg, PRN  dextrose 5 % solution, PRN  amiodarone (CORDARONE) tablet 400 mg, BID        Objective:  /71   Pulse 94   Temp 98.1 °F (36.7 °C) (Oral)   Resp 18   Ht 5' 8\" (1.727 m)   Wt 210 lb 11.2 oz (95.6 kg)   SpO2 93%   BMI 32.04 kg/m²     Intake/Output Summary (Last 24 hours) at 4/27/2021 1310  Last data filed at 4/27/2021 1034  Gross per 24 hour   Intake 1124.57 ml   Output 2175 ml   Net -1050.43 ml      Wt Readings from Last 3 Encounters:   04/27/21 210 lb 11.2 oz (95.6 kg)   04/13/21 205 lb (93 kg)   04/02/21 206 lb 9.6 oz (93.7 kg)   2-3+ BL LE edema  Obese  General appearance:  Appears comfortable  Eyes: Sclera clear. Pupils equal.  ENT: Moist oral mucosa. Trachea midline, no adenopathy. Cardiovascular: Regular rhythm, normal S1, S2. No murmur. Respiratory: Not using accessory muscles. Good inspiratory effort. Clear to auscultation bilaterally, no wheeze or crackles. GI: Abdomen soft, no tenderness, not distended, normal bowel sounds  Musculoskeletal: No cyanosis in digits, neck supple  Neurology: CN 2-12 grossly intact. No speech or motor deficits  Psych: Normal affect. Alert and oriented in time, place and person  Skin: Warm, dry, normal turgor  Extremity exam shows brisk capillary refill.   Peripheral pulses are palpable in lower extremities     Labs and Tests:  CBC:   Recent Labs     04/27/21  0420   WBC 4.7   HGB 9.7*        BMP:    Recent Labs     04/25/21  0524 04/26/21  0431 04/27/21  0420    138 136   K 3.7 3.7 3.2*    100 99   CO2 27 28 26   BUN 29* 26* 26*   CREATININE 1.6* 1.6* 1.3*   GLUCOSE 127* 104* 95     Hepatic: No results for input(s): AST, ALT, ALB, BILITOT, ALKPHOS in the last 72 hours. XR CHEST PORTABLE   Final Result   No acute cardiopulmonary disease. Stable cardiomegaly with no evidence of   overt failure.                Benji Palm MD   4/27/2021 1:10 PM

## 2021-04-27 NOTE — PROGRESS NOTES
Physical Therapy    Facility/Department: [address]  Initial Assessment/Discharge Summary     NAME: Rainer Delgado  : 1945  MRN: [id number]    Date of Service: 2021    Discharge Recommendations:  Rainer Delgado scored a 21/24 on the AM-PAC short mobility form. At this time, no further PT is recommended upon discharge due to functioning close to baseline. Recommend patient returns to prior setting with prior services. Home with assist PRN   PT Equipment Recommendations  Equipment Needed: No    Assessment   Assessment: Pt presenting close to her baseline but with decreased endurance due to CHF. Pt's spouse present to continue assisting pt as needed. No further acute PT needs at this time. Prognosis: Good  Decision Making: Low Complexity  PT Education: PT Role  Patient Education: verbalized understanding  REQUIRES PT FOLLOW UP: No  Activity Tolerance  Activity Tolerance: Patient limited by endurance       Patient Diagnosis(es): The primary encounter diagnosis was Atrial fibrillation with rapid ventricular response ([address] Diagnoses of Warfarin anticoagulation, Chest pain, unspecified type, and Dyspnea on exertion were also pertinent to this visit. has a past medical history of Asthma, Atrial fibrillation ([address] Eosinophilia, Hemoptysis, HIGH CHOLESTEROL, Hx of blood clots, Hypertension, Irregular heart beat, Other specified gastritis without mention of hemorrhage, Palpitations, Skin cancer, and Type II or unspecified type diabetes mellitus without mention of complication, not stated as uncontrolled. has a past surgical history that includes Cholecystectomy;  section; Colonoscopy (2017); skin biopsy; bronchoscopy (2016); Coronary artery bypass graft (2018); Mitral valve replacement (2018); transesophageal echocardiogram (2018); Tunneled venous catheter placement (Left, 2018); Cardiac catheterization (2018);  Insertable Cardiac dressing)  Homemaking Assistance: Needs assistance(spouse completes completes cooking, cleaning, laundry)  Ambulation Assistance: Independent  Transfer Assistance: Independent  Active : Yes(spouse usually drives)  Occupation: Retired  Leisure & Hobbies: reading and watching tv  Additional Comments: pt denies falls  Cognition        Objective     Observation/Palpation  Posture: Fair  Observation: B LE edema    AROM RLE (degrees)  RLE AROM: WFL  AROM LLE (degrees)  LLE AROM : WFL  Strength RLE  Strength RLE: WFL  Strength LLE  Strength LLE: WFL        Bed mobility  Comment: did not assess, pt in recliner at beginning and end of session  Transfers  Sit to Stand: Independent  Stand to sit: Independent  Ambulation  Ambulation?: Yes  Ambulation 1  Surface: level tile  Device: No Device(IV pole)  Assistance: Supervision  Gait Deviations: Slow Zoraida  Distance: 35 ft  Comments: distance limited by SOB  Stairs/Curb  Stairs?: No     Balance  Posture: Fair  Sitting - Static: Good  Sitting - Dynamic: Good  Standing - Static: -;Good  Standing - Dynamic: Fair;+        Plan   Plan  Times per week: eval and dc  Safety Devices  Type of devices:  All fall risk precautions in place, Call light within reach, Left in chair, Nurse notified    G-Code       OutComes Score                                                  AM-PAC Score  AM-PAC Inpatient Mobility Raw Score : 21 (04/27/21 1107)  AM-PAC Inpatient T-Scale Score : 50.25 (04/27/21 1107)  Mobility Inpatient CMS 0-100% Score: 28.97 (04/27/21 1107)  Mobility Inpatient CMS G-Code Modifier : Pascual Mendieta (04/27/21 1107)          Goals  Short term goals  Time Frame for Short term goals: no acute goals identified, no further PT needs at this time       Therapy Time   Individual Concurrent Group Co-treatment   Time In 1023         Time Out 1046         Minutes 23         Timed Code Treatment Minutes: Tawana Sandhu, [id number]

## 2021-04-27 NOTE — PROGRESS NOTES
Assessment complete. Abdomen is distended with active bowel sounds is all 4 quadrants. C/o constipation. Glycolax given in 6 oz of water. Also noted red and swollen tissues around her eyes. C/o itching. No drainage at this time but reports having some in the am. Message sent to hospitalist regarding eye irritation.

## 2021-04-27 NOTE — PROGRESS NOTES
C/o itching, \"all over\", benadryl 25 mg po given. Also in pain. Will medicate with Percocet at 0200.  V.U.

## 2021-04-27 NOTE — PROGRESS NOTES
Occupational Therapy   Occupational Therapy Initial Assessment/discharge  Date: 2021   Patient Name: Ian Schultz  MRN: [id number]     : 1945    Date of Service: 2021    Discharge Recommendations:  Ian Schultz scored a 21/24 on the AM-[facility] ADL Inpatient form. At this time, no further OT is recommended upon discharge due to pt being at baseline. Recommend patient returns to prior setting with prior services. OT Equipment Recommendations  Equipment Needed: No(pt getting walk in tub installed)    Assessment   Assessment: eval and d/c. educated pt on energy conservation. pt feels she is at baseline and feels she does not need further skilled OT services  Treatment Diagnosis: PAF  Prognosis: Fair  Decision Making: Low Complexity  History: pt lives with spouse, independent with mobility. has PRN assist with ADLs  Patient Education: eval, discharge, energy conservation-pt independent with education  REQUIRES OT FOLLOW UP: No  Activity Tolerance  Activity Tolerance: Patient Tolerated treatment well  Safety Devices  Safety Devices in place: Yes  Type of devices: All fall risk precautions in place;Nurse notified;Call light within reach; Left in chair(no chair alarm upon arrival, left off and RN notified)  Restraints  Initially in place: No           Patient Diagnosis(es): The primary encounter diagnosis was Atrial fibrillation with rapid ventricular response ([address] Diagnoses of Warfarin anticoagulation, Chest pain, unspecified type, and Dyspnea on exertion were also pertinent to this visit. has a past medical history of Asthma, Atrial fibrillation ([address] Eosinophilia, Hemoptysis, HIGH CHOLESTEROL, Hx of blood clots, Hypertension, Irregular heart beat, Other specified gastritis without mention of hemorrhage, Palpitations, Skin cancer, and Type II or unspecified type diabetes mellitus without mention of complication, not stated as uncontrolled.    has a past surgical history that includes Cholecystectomy;  section; Colonoscopy (2017); skin biopsy; bronchoscopy (2016); Coronary artery bypass graft (2018); Mitral valve replacement (2018); transesophageal echocardiogram (2018); Tunneled venous catheter placement (Left, 2018); Cardiac catheterization (2018); Insertable Cardiac Monitor (Left, 2018); Colonoscopy (2014); Hysterectomy; Upper gastrointestinal endoscopy (N/A, 10/10/2018); Colonoscopy (10/10/2018); Colonoscopy (N/A, 2020); Pain management procedure (N/A, 2020); and Pain management procedure (Bilateral, 2021). Treatment Diagnosis: PAF      Restrictions  Restrictions/Precautions  Restrictions/Precautions: Fall Risk(high fall risk)  Position Activity Restriction  Other position/activity restrictions: Ameya Lowry is a 77 y.o. female with a past medical history of HTN, HLD, DM, CAD s/p CABG x 3, S/p bioprosthetic MVR, WAYNE clip (2018), afib s/p Maze , CMP (EF25-30%) DVT/PE on warfarin and sCHF follows with HF. S/p ablation of afib w/ PVI, roofline, posterior, CTI flutter 10/30/2019. S/p ILR. Hx of Mobitz I AVB and prolonged MI interval. S/p JUDE/CV. Has been treated with amiodarone stopped in . Presented with palpitations and tachycardia, found to be in afib/RVR and started on diltiazem gtt. Reportedly her carvedilol was reduced recently. Subjective   General  Chart Reviewed: Yes  Family / Caregiver Present: Yes(spouse present)  Diagnosis: a fib  Subjective  Subjective: pt up in chair upon arrival and agreeable to OT eval, reporting 7/10 pain in back. did not need pain meds at this time.   Patient Currently in Pain: Yes  Pain Assessment  Pain Assessment: 0-10  Pain Level: 7  Pain Type: Chronic pain  Pain Location: Back  Pre Treatment Pain Screening  Intervention List: Patient declined any intervention(pt reports she already had pain meds)  Vital Signs  Patient Currently in Pain: Yes  Social/Functional History  Social/Functional History  Lives With: Spouse  Type of Home: House  Home Layout: One level(laundry in basement, spouse does laundry)  Home Access: Stairs to enter with rails(3 HARLEY)  Entrance Stairs - Rails: Both  Bathroom Shower/Tub: Walk-in shower(pt getting a walk in tub installed tomorrow)  Bathroom Toilet: Handicap height  ADL Assistance: Needs assistance(pt reports assist as need from spouse with bathing and dressing)  Homemaking Assistance: Needs assistance(spouse completes completes cooking, cleaning, laundry)  Ambulation Assistance: Independent  Transfer Assistance: Independent  Active : Yes(spouse usually drives)  Occupation: Retired  Leisure & Hobbies: reading and watching tv  Additional Comments: pt denies falls       Objective   Vision: Impaired  Vision Exceptions: Wears glasses for reading  Hearing: Within functional limits    Orientation  Overall Orientation Status: Within Functional Limits  Observation/Palpation  Posture: Fair  Observation: B LE edema  Balance  Sitting Balance: Independent  Standing Balance: Modified independent (use of IV pole)  Standing Balance  Time: ~3-4 minutes  Activity: pt reports she is at baseline level of functioning with mobility, short bouts of mobility secondary to heart failure. Functional Mobility  Functional - Mobility Device: Other(IV used)  Activity: Other  Assist Level: Modified independent   ADL  Additional Comments: pt declined ADLs, dressing in her own clothing,  provides PRN assist for ADLs  Tone RUE  RUE Tone: Normotonic  Tone LUE  LUE Tone: Normotonic  Coordination  Movements Are Fluid And Coordinated: Yes     Bed mobility  Comment: did not assess, pt in recliner at beginning and end of session  Transfers  Sit to stand: Independent  Stand to sit:  Independent     Cognition  Overall Cognitive Status: WNL  Perception  Overall Perceptual Status: WFL     Sensation  Overall Sensation Status: Impaired  Light Touch: Partial deficits in the RUE;Partial deficits in the LUE(5th digit numbness B)        LUE AROM (degrees)  LUE AROM : Exceptions  RUE AROM (degrees)  RUE AROM : Exceptions  RUE General AROM: decreased shoulder ROM bilaterally-shoulder flexion  LUE Strength  Gross LUE Strength: WFL  RUE Strength  Gross RUE Strength: WFL                   Plan   Plan  Times per week: eval and d/c      AM-PAC Score        AM-PAC Inpatient Daily Activity Raw Score: 21 (04/27/21 1103)  AM-PAC Inpatient ADL T-Scale Score : 44.27 (04/27/21 1103)  ADL Inpatient CMS 0-100% Score: 32.79 (04/27/21 1103)  ADL Inpatient CMS G-Code Modifier : Jazmin David (04/27/21 1103)    Goals  Patient Goals   Patient goals : eval and d/c. educated pt on energy conservation.  pt feels she is at baseline and feels she does not need further skilled OT services       Therapy Time   Individual Concurrent Group Co-treatment   Time In 1023         Time Out 1046         Minutes 23           Timed Code Treatment Minutes:   8 minutes    Total Treatment Minutes:  23 minutes      Roz Keenan OTR/L [id number] [facility] [address], OT

## 2021-04-28 LAB
ALBUMIN SERPL-MCNC: 2.8 G/DL (ref 3.4–5)
ANION GAP SERPL CALCULATED.3IONS-SCNC: 10 MMOL/L (ref 3–16)
BASOPHILS ABSOLUTE: 0 K/UL (ref 0–0.2)
BASOPHILS RELATIVE PERCENT: 0.5 %
BUN BLDV-MCNC: 26 MG/DL (ref 7–20)
CALCIUM SERPL-MCNC: 8.3 MG/DL (ref 8.3–10.6)
CHLORIDE BLD-SCNC: 101 MMOL/L (ref 99–110)
CO2: 26 MMOL/L (ref 21–32)
CREAT SERPL-MCNC: 1.3 MG/DL (ref 0.6–1.2)
EOSINOPHILS ABSOLUTE: 0.2 K/UL (ref 0–0.6)
EOSINOPHILS RELATIVE PERCENT: 5.6 %
GFR AFRICAN AMERICAN: 48
GFR NON-AFRICAN AMERICAN: 40
GLUCOSE BLD-MCNC: 121 MG/DL (ref 70–99)
GLUCOSE BLD-MCNC: 124 MG/DL (ref 70–99)
GLUCOSE BLD-MCNC: 149 MG/DL (ref 70–99)
GLUCOSE BLD-MCNC: 168 MG/DL (ref 70–99)
GLUCOSE BLD-MCNC: 188 MG/DL (ref 70–99)
HCT VFR BLD CALC: 29.6 % (ref 36–48)
HEMOGLOBIN: 9.5 G/DL (ref 12–16)
INR BLD: 2.52 (ref 0.86–1.14)
LYMPHOCYTES ABSOLUTE: 0.6 K/UL (ref 1–5.1)
LYMPHOCYTES RELATIVE PERCENT: 13.6 %
MAGNESIUM: 1.7 MG/DL (ref 1.8–2.4)
MCH RBC QN AUTO: 27.2 PG (ref 26–34)
MCHC RBC AUTO-ENTMCNC: 32.1 G/DL (ref 31–36)
MCV RBC AUTO: 84.8 FL (ref 80–100)
MONOCYTES ABSOLUTE: 0.7 K/UL (ref 0–1.3)
MONOCYTES RELATIVE PERCENT: 16.2 %
NEUTROPHILS ABSOLUTE: 2.8 K/UL (ref 1.7–7.7)
NEUTROPHILS RELATIVE PERCENT: 64.1 %
PDW BLD-RTO: 17.2 % (ref 12.4–15.4)
PERFORMED ON: ABNORMAL
PHOSPHORUS: 3.7 MG/DL (ref 2.5–4.9)
PLATELET # BLD: 227 K/UL (ref 135–450)
PMV BLD AUTO: 7.7 FL (ref 5–10.5)
POTASSIUM SERPL-SCNC: 3.3 MMOL/L (ref 3.5–5.1)
PROTHROMBIN TIME: 29.5 SEC (ref 10–13.2)
RBC # BLD: 3.49 M/UL (ref 4–5.2)
SODIUM BLD-SCNC: 137 MMOL/L (ref 136–145)
WBC # BLD: 4.4 K/UL (ref 4–11)

## 2021-04-28 PROCEDURE — 83735 ASSAY OF MAGNESIUM: CPT

## 2021-04-28 PROCEDURE — 6370000000 HC RX 637 (ALT 250 FOR IP): Performed by: INTERNAL MEDICINE

## 2021-04-28 PROCEDURE — 6360000002 HC RX W HCPCS: Performed by: INTERNAL MEDICINE

## 2021-04-28 PROCEDURE — 2580000003 HC RX 258: Performed by: NURSE PRACTITIONER

## 2021-04-28 PROCEDURE — 80069 RENAL FUNCTION PANEL: CPT

## 2021-04-28 PROCEDURE — 85025 COMPLETE CBC W/AUTO DIFF WBC: CPT

## 2021-04-28 PROCEDURE — 94760 N-INVAS EAR/PLS OXIMETRY 1: CPT

## 2021-04-28 PROCEDURE — 6360000002 HC RX W HCPCS: Performed by: NURSE PRACTITIONER

## 2021-04-28 PROCEDURE — 2060000000 HC ICU INTERMEDIATE R&B

## 2021-04-28 PROCEDURE — 2580000003 HC RX 258: Performed by: INTERNAL MEDICINE

## 2021-04-28 PROCEDURE — 99233 SBSQ HOSP IP/OBS HIGH 50: CPT | Performed by: NURSE PRACTITIONER

## 2021-04-28 PROCEDURE — 85610 PROTHROMBIN TIME: CPT

## 2021-04-28 PROCEDURE — 36415 COLL VENOUS BLD VENIPUNCTURE: CPT

## 2021-04-28 PROCEDURE — 6370000000 HC RX 637 (ALT 250 FOR IP): Performed by: NURSE PRACTITIONER

## 2021-04-28 PROCEDURE — 99233 SBSQ HOSP IP/OBS HIGH 50: CPT | Performed by: INTERNAL MEDICINE

## 2021-04-28 RX ORDER — AMIODARONE HYDROCHLORIDE 200 MG/1
200 TABLET ORAL DAILY
Status: DISCONTINUED | OUTPATIENT
Start: 2021-04-29 | End: 2021-05-05

## 2021-04-28 RX ORDER — METOLAZONE 2.5 MG/1
5 TABLET ORAL DAILY
Status: DISCONTINUED | OUTPATIENT
Start: 2021-04-28 | End: 2021-05-02

## 2021-04-28 RX ADMIN — METOPROLOL SUCCINATE 25 MG: 25 TABLET, EXTENDED RELEASE ORAL at 08:24

## 2021-04-28 RX ADMIN — OXYCODONE AND ACETAMINOPHEN 1 TABLET: 325; 10 TABLET ORAL at 17:55

## 2021-04-28 RX ADMIN — FUROSEMIDE 15 MG/HR: 10 INJECTION, SOLUTION INTRAMUSCULAR; INTRAVENOUS at 14:17

## 2021-04-28 RX ADMIN — PANTOPRAZOLE SODIUM 40 MG: 40 TABLET, DELAYED RELEASE ORAL at 06:53

## 2021-04-28 RX ADMIN — POTASSIUM CHLORIDE 40 MEQ: 1500 TABLET, EXTENDED RELEASE ORAL at 17:54

## 2021-04-28 RX ADMIN — INSULIN LISPRO 1 UNITS: 100 INJECTION, SOLUTION INTRAVENOUS; SUBCUTANEOUS at 21:28

## 2021-04-28 RX ADMIN — OXYCODONE 5 MG: 5 TABLET ORAL at 21:27

## 2021-04-28 RX ADMIN — FUROSEMIDE 15 MG/HR: 10 INJECTION, SOLUTION INTRAMUSCULAR; INTRAVENOUS at 00:26

## 2021-04-28 RX ADMIN — AMIODARONE HYDROCHLORIDE 400 MG: 200 TABLET ORAL at 08:23

## 2021-04-28 RX ADMIN — SPIRONOLACTONE 12.5 MG: 25 TABLET ORAL at 08:23

## 2021-04-28 RX ADMIN — MONTELUKAST SODIUM 10 MG: 10 TABLET, FILM COATED ORAL at 21:27

## 2021-04-28 RX ADMIN — POTASSIUM CHLORIDE 40 MEQ: 1500 TABLET, EXTENDED RELEASE ORAL at 08:23

## 2021-04-28 RX ADMIN — METOLAZONE 5 MG: 2.5 TABLET ORAL at 12:05

## 2021-04-28 RX ADMIN — POLYETHYLENE GLYCOL 3350 17 G: 17 POWDER, FOR SOLUTION ORAL at 21:27

## 2021-04-28 RX ADMIN — POLYETHYLENE GLYCOL 3350 17 G: 17 POWDER, FOR SOLUTION ORAL at 08:24

## 2021-04-28 RX ADMIN — ASPIRIN 81 MG: 81 TABLET, CHEWABLE ORAL at 08:23

## 2021-04-28 RX ADMIN — NAPHAZOLINE HYDROCHLORIDE AND PHENIRAMINE MALEATE 1 DROP: .25; 3 SOLUTION/ DROPS OPHTHALMIC at 21:27

## 2021-04-28 RX ADMIN — MILRINONE LACTATE 0.2 MCG/KG/MIN: 200 INJECTION, SOLUTION INTRAVENOUS at 17:58

## 2021-04-28 RX ADMIN — FUROSEMIDE 15 MG/HR: 10 INJECTION, SOLUTION INTRAMUSCULAR; INTRAVENOUS at 07:28

## 2021-04-28 RX ADMIN — OXYCODONE AND ACETAMINOPHEN 1 TABLET: 325; 10 TABLET ORAL at 00:31

## 2021-04-28 RX ADMIN — Medication 10 ML: at 21:28

## 2021-04-28 RX ADMIN — FUROSEMIDE 15 MG/HR: 10 INJECTION, SOLUTION INTRAMUSCULAR; INTRAVENOUS at 23:13

## 2021-04-28 RX ADMIN — OXYCODONE 5 MG: 5 TABLET ORAL at 12:19

## 2021-04-28 RX ADMIN — NAPHAZOLINE HYDROCHLORIDE AND PHENIRAMINE MALEATE 1 DROP: .25; 3 SOLUTION/ DROPS OPHTHALMIC at 08:25

## 2021-04-28 RX ADMIN — INSULIN LISPRO 2 UNITS: 100 INJECTION, SOLUTION INTRAVENOUS; SUBCUTANEOUS at 12:05

## 2021-04-28 RX ADMIN — OXYCODONE AND ACETAMINOPHEN 1 TABLET: 325; 10 TABLET ORAL at 06:53

## 2021-04-28 ASSESSMENT — PAIN SCALES - GENERAL
PAINLEVEL_OUTOF10: 7
PAINLEVEL_OUTOF10: 8

## 2021-04-28 ASSESSMENT — PAIN DESCRIPTION - PAIN TYPE
TYPE: ACUTE PAIN

## 2021-04-28 ASSESSMENT — PAIN DESCRIPTION - LOCATION
LOCATION: RIB CAGE
LOCATION: SHOULDER
LOCATION: SHOULDER;NECK

## 2021-04-28 ASSESSMENT — PAIN - FUNCTIONAL ASSESSMENT: PAIN_FUNCTIONAL_ASSESSMENT: PREVENTS OR INTERFERES SOME ACTIVE ACTIVITIES AND ADLS

## 2021-04-28 ASSESSMENT — PAIN DESCRIPTION - ONSET
ONSET: ON-GOING
ONSET: ON-GOING

## 2021-04-28 ASSESSMENT — PAIN DESCRIPTION - PROGRESSION
CLINICAL_PROGRESSION: NOT CHANGED
CLINICAL_PROGRESSION: NOT CHANGED

## 2021-04-28 ASSESSMENT — PAIN DESCRIPTION - DESCRIPTORS
DESCRIPTORS: SHARP;ACHING
DESCRIPTORS: SHARP

## 2021-04-28 ASSESSMENT — PAIN DESCRIPTION - FREQUENCY
FREQUENCY: CONTINUOUS

## 2021-04-28 ASSESSMENT — PAIN DESCRIPTION - ORIENTATION: ORIENTATION: RIGHT

## 2021-04-28 NOTE — CONSULTS
Palliative Care:   a 77 y.o. female who yesterday night started to have midsternal chest pain that was 7 out of 10 pressure-like associated with palpitations heart rate was up to 140s. Was short of breath no diaphoresis some nausea no vomiting. Patient came to the ED was in A. fib with RVR. Patient states she has been compliant with her meds. No fever no chills no diarrhea no cough no sick contacts. Admitted 4/15/21 for symptom management and Palliative consult ordered 21. Possible AICD pending during admit due to EF of about 15%. Currently on milrinone and Lasix drip. Past Medical History:   has a past medical history of Asthma, Atrial fibrillation ([address] Eosinophilia, Hemoptysis, HIGH CHOLESTEROL, Hx of blood clots, Hypertension, Irregular heart beat, Other specified gastritis without mention of hemorrhage, Palpitations, Skin cancer, and Type II or unspecified type diabetes mellitus without mention of complication, not stated as uncontrolled. Past Surgical History:   has a past surgical history that includes Cholecystectomy;  section; Colonoscopy (2017); skin biopsy; bronchoscopy (2016); Coronary artery bypass graft (2018); Mitral valve replacement (2018); transesophageal echocardiogram (2018); Tunneled venous catheter placement (Left, 2018); Cardiac catheterization (2018); Insertable Cardiac Monitor (Left, 2018); Colonoscopy (2014); Hysterectomy; Upper gastrointestinal endoscopy (N/A, 10/10/2018); Colonoscopy (10/10/2018); Colonoscopy (N/A, 2020); Pain management procedure (N/A, 2020); and Pain management procedure (Bilateral, 2021). Advance Directives:   Full Code per patient wishes. No ACP.  acting DPOA if needed. Physician has completed Goals in Care on 21. Problem Severity: Pain/Other Symptoms:   Some intermittent sternal pain. Pain PRN medication in place.         Bed Mobility/Toileting/Transfer: Independent in home setting. Performance Status:        Palliative Performance Scale:  100% []Full Normal activity & work No evidence of disease  90%   [] Full Normal activity & work Some evidence of disease  80%   [] Full Normal activity with Effort Some evidence of disease  70%   [] Reduced Unable Normal Job/Work Significant disease Full Normal or reduced  60%   [x] Ambulation reduced; Significant disease; Can't do hobbies/housework; intake normal   or reduced; occasional assist; LOC full/confusion  50%   [] Mainly sit/lie; Extensive disease; Can't do any work; Considerable assist; intake normal  Or reduced; LOC full/confusion  40%   [] Mainly in bed; Extensive disease; Mainly assist; intake normal or reduced; occasional assist; LOC full/confusion  30%   [] Bed Bound; Extensive disease; Total care; intake reduced; LOC full/confusion  20%   [] Bed Bound; Extensive disease; Total care; intake minimal; Drowsy/coma  10%   [] Bed Bound; Extensive disease; Total care; Mouth care only; Drowsy/coma    PPS 60%  Symptom Assessment: Appetite/Nausea/Bowels/Fatigue:    lbs. Albumin 2.8    Social History:   reports that she has never smoked. She has never used smokeless tobacco. She reports that she does not drink alcohol or use drugs. Family History:  family history includes Asthma in her mother; Cancer in her father; Heart Disease in her mother; High Blood Pressure in her mother; Hypertension in her mother. Psychological/Spiritual:    . Gnosticism.        Family Discussion:      Patient A/O X3. Admits to mild nausea and shakiness this afternoon. Sitting up in chair. Denies any further chest pain or need to assist into bed for comfort.  at bedside and very supportive. Discussed ACP with physician. Wishes are Full Code. Discussed Mu-ism. Denies offer for  spiritual support. Patient wishes for her own  to come.  Instructed family to reach out to determine if he would like to come in for spiritual support and can notify nurse if this is desired for approval. My contact information shared with patient. DC plan after ACIP placement possible tomorrow is to return to home setting and follow up out patient with cardiology. Will continue to follow as needed.

## 2021-04-28 NOTE — PROGRESS NOTES
Nephrology Attending  Progress Note        SUMMARY :  We are following this patient for TORIBIO on CKD stage 3   77 y.o. female who yesterday night started to have midsternal chest pain that was 7 out of 10 pressure-like associated with palpitations heart rate was up to 140s. Was short of breath no diaphoresis some nausea no vomiting. Patient came to the ED was in A. fib with RVR. SUBJECTIVE:   Patient progress reviewed.    Dyspnea + , No dysuria   in the room   AICD placement postponed for now     Physical Exam:    VITALS:  /75   Pulse 92   Temp 98.2 °F (36.8 °C) (Oral)   Resp 16   Ht 5' 8\" (1.727 m)   Wt 209 lb 9.6 oz (95.1 kg)   SpO2 93%   BMI 31.87 kg/m²   BLOOD PRESSURE RANGE:  Systolic (66EUV), EGD:484 , Min:108 , NHF:442   ; Diastolic (20PHR), HDW:98, Min:69, Max:75    24HR INTAKE/OUTPUT:      Intake/Output Summary (Last 24 hours) at 4/28/2021 1054  Last data filed at 4/28/2021 0815  Gross per 24 hour   Intake 1945.84 ml   Output 1175 ml   Net 770.84 ml       Gen:  alert, oriented x 3  PERRL , EOM +  Pallor +, No icterus  JVP not raised   CV: IRRR , Gr 4/6 systolic murmur heard all over precordium   Lungs:B/ L air entry, Normal breath sounds ,  crackles at bases   Abd: soft, bowel sounds + , No organomegaly   Ext: 2+ edema, no cyanosis  No  Rash   Neuro: nonfocal.      DATA:    CBC with Differential:    Lab Results   Component Value Date    WBC 4.4 04/28/2021    RBC 3.49 04/28/2021    HGB 9.5 04/28/2021    HCT 29.6 04/28/2021     04/28/2021    MCV 84.8 04/28/2021    MCH 27.2 04/28/2021    MCHC 32.1 04/28/2021    RDW 17.2 04/28/2021    SEGSPCT 64.1 09/02/2020    BANDSPCT 1 01/14/2019    LYMPHOPCT 13.6 04/28/2021    MONOPCT 16.2 04/28/2021    BASOPCT 0.5 04/28/2021    MONOSABS 0.7 04/28/2021    LYMPHSABS 0.6 04/28/2021    EOSABS 0.2 04/28/2021    BASOSABS 0.0 04/28/2021     CMP:    Lab Results   Component Value Date     04/28/2021    K 3.3 04/28/2021    K 3.5 04/21/2021    CL 101 04/28/2021    CO2 26 04/28/2021    BUN 26 04/28/2021    CREATININE 1.3 04/28/2021    GFRAA 48 04/28/2021    AGRATIO 0.8 04/15/2021    LABGLOM 40 04/28/2021    LABGLOM 45 12/06/2018    GLUCOSE 121 04/28/2021    PROT 6.6 04/15/2021    LABALBU 2.8 04/28/2021    CALCIUM 8.3 04/28/2021    BILITOT 0.6 04/15/2021    ALKPHOS 215 04/15/2021    AST 15 04/15/2021    ALT 10 04/15/2021     Magnesium:    Lab Results   Component Value Date    MG 1.70 04/28/2021     Phosphorus:    Lab Results   Component Value Date    PHOS 3.7 04/28/2021     Troponin:    Lab Results   Component Value Date    TROPONINI 0.02 04/15/2021     U/A:    Lab Results   Component Value Date    COLORU Yellow 04/15/2021    PROTEINU Negative 04/15/2021    PHUR 6.5 04/15/2021    WBCUA 3 04/15/2021    RBCUA 2 04/15/2021    YEAST neg 02/12/2021    BACTERIA trace 02/12/2021    BACTERIA 1+ 05/11/2020    CLARITYU Clear 04/15/2021    SPECGRAV 1.020 04/15/2021    LEUKOCYTESUR Negative 04/15/2021    UROBILINOGEN 0.2 04/15/2021    BILIRUBINUR Negative 04/15/2021    BLOODU TRACE-INTACT 04/15/2021    GLUCOSEU Negative 04/15/2021         IMPRESSION/RECOMMENDATIONS:      Diagnosis and Plan     1. TORIBIO   due to renal hypoperfusion secondary to hypotension as well as atrial fibrillation and diuretic use  Cr 1.3, stable    2. Hypokalemia   Being Replaced po     3. CKD stage 3   Presumptive  diabetic nephropathy, however no proteinuria    Baseline  creatinine 1.1    4. HTN     5. Afib with RVR  Now controlled     6.  sCHF    acute on chronic exacerbation, recent EF 10 to 15%        Maritza Bruner

## 2021-04-28 NOTE — PROGRESS NOTES
[address]  HEART FAILURE  Progress Note      Admit Date 4/15/2021     Reason for Consult:      Reason for Consultation/Chief Complaint: chest pain    HPI:    Fatoumata Vargas is a 77 y.o. female with PMH CAD, CABG/Maze 2018, AF, WAYNE clip 2018, s/p Ablation, ILR, HTN, HLD, DVT, PE, ICM, HFrEF admitted with tachycardia, was in AF with RVR, dilt gtt started and unsuccessful CVx2. Echo with very low EF 10-15% and she was scheduled for ICD but INR was elevated. She has been on lasix gtt and milrinone,  lasix increased      Subjective:  Patient is being seen for CHF, ICM. Her LVEF has dropped to around 10-15%. She has now been on milrinone and lasix drips for many days without significant diuresis. she also has valvular heart disease. There were no acute overnight cardiac events. Today Ms. Lor Varner c/o continue SOB, abd distension and edema. She denies CP or palpitations. We discussed that she is not diuresing well despite days of IV milrinone and lasix. Given po metolazone today. She has been diuresing a little better today. She has had multiple admissions recently for fluid overload. She is not responding to usual therapies. This was discussed with patient and . I also discussed with Dr. Luis Arias who has evaluated her in the past for aortic valve pathology. Now that LVEF has dropped, she probably cannot have TAVR.       Baseline Weight: 198   Wt Readings from Last 3 Encounters:   04/28/21 209 lb 9.6 oz (95.1 kg)   04/13/21 205 lb (93 kg)   04/02/21 206 lb 9.6 oz (93.7 kg)          Objective:   /75   Pulse 87   Temp 97.4 °F (36.3 °C) (Oral)   Resp 20   Ht 5' 8\" (1.727 m)   Wt 209 lb 9.6 oz (95.1 kg)   SpO2 95%   BMI 31.87 kg/m²       Intake/Output Summary (Last 24 hours) at 4/28/2021 1431  Last data filed at 4/28/2021 1418  Gross per 24 hour   Intake 2506.72 ml   Output 2130 ml   Net 376.72 ml      Wt Readings from Last 3 Encounters:   04/28/21 209 lb 9.6 oz (95.1 kg)   04/13/21 1417)    milrinone 0.2 mcg/kg/min (04/28/21 1417)    sodium chloride      dextrose         Intake/Output Summary (Last 24 hours) at 4/28/2021 1431  Last data filed at 4/28/2021 1418  Gross per 24 hour   Intake 2506.72 ml   Output 2130 ml   Net 376.72 ml       Lab Data:  CBC:   Lab Results   Component Value Date    WBC 4.4 04/28/2021    HGB 9.5 04/28/2021     04/28/2021     BMP:  Lab Results   Component Value Date     04/28/2021    K 3.3 04/28/2021    K 3.5 04/21/2021     04/28/2021    CO2 26 04/28/2021    BUN 26 04/28/2021    CREATININE 1.3 04/28/2021    GLUCOSE 121 04/28/2021     INR:   Lab Results   Component Value Date    INR 2.52 04/28/2021    INR 2.85 04/27/2021    INR 3.36 04/26/2021        CARDIAC LABS  ENZYMES:No results for input(s): CKMB, CKMBINDEX, TROPONINI in the last 72 hours. Invalid input(s): CKTOTAL;3  FASTING LIPID PANEL:  Lab Results   Component Value Date    HDL 40 12/23/2020    LDLDIRECT 127 08/21/2018    LDLCALC 97 12/23/2020    TRIG 94 03/03/2021    TSH 1.90 04/01/2021     LIVER PROFILE:  Lab Results   Component Value Date    AST 15 04/15/2021    AST 16 04/15/2021    ALT 10 04/15/2021    ALT 11 04/15/2021     BNP:   Lab Results   Component Value Date    PROBNP 10,037 04/27/2021    PROBNP 6,712 04/22/2021    PROBNP 15,113 04/18/2021     Iron Studies:    Lab Results   Component Value Date    FERRITIN 799.9 10/05/2018    FERRITIN 416.5 08/30/2018     Lab Results   Component Value Date    IRON 49 11/30/2020    TIBC 310 11/30/2020    FERRITIN 799.9 (H) 10/05/2018         1. WEIGHT: Admit Weight: 203 lb 4 oz (92.2 kg)      Today  Weight: 209 lb 9.6 oz (95.1 kg)   2.  I/O     Intake/Output Summary (Last 24 hours) at 4/28/2021 1431  Last data filed at 4/28/2021 1418  Gross per 24 hour   Intake 2506.72 ml   Output 2130 ml   Net 376.72 ml       Cardiac Testing:   Echo 4/20/21  Summary   Overall left ventricular systolic function is severely depressed .   Ejection fraction is visually estimated to be 10-15 %. E/e'= 23.2 GLS=-3.4   Moderately dilated left ventricle.   There is severe diffuse hypokinesis.   Indeterminate diastolic function.   A bioprosthetic mitral valve is well seated with peak velocity of 1.59m/s   and a mean gradient of 3mmHg.   The left atrium is moderately dilated.   Mild aortic stenosis with a peak velocity of 2.5m/s and a mean pressure   gradient of 14mmHg.   The aortic valve is thickened/calcified with decreased leaflet mobility   consistent with aortic stenosis.   Mild to moderate tricuspid regurgitation.   Estimated pulmonary artery systolic pressure is mildly to moderately   elevated at 46 mmHg assuming a right atrial pressure of 8 mmHg     11/30/2020 Dobutamine Echo   Dobutamine echocardiogram for aortic stenosis.   Very technically limited exam due to atrial fibrillation.   Baseline echocardiogram shows severe left ventricular dysfunction with   anterior, lateral and apical hypokinesis with an ejection fraction of 20-25%.   There is no improvement in wall motion with low or high dose dobutamine   suggestive of nonviable myocardium.      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 After dobutamine stress the mean AV   gradient rises to 20mmHg with 20mcg of dobutamine consistent with mild to   moderate aortic stenosis.  Prosthetic mitral valve with a mean gradient of 7mmHg     Labs were reviewed including labs from other hospital systems through [facility]. Cardiac testing was reviewed including echos, nuclear scans, cardiac catheterization, including from other hospital systems through [facility]. Assessment/Plan:     1. AHF- lasix gtt increased to 15 yesterday, 1L out, BNP continues to trend up, will continue today. Add metolazone 2.5 mg po today to lasix drip and milrinone. 2. ICM-at this point, she is not a candidate for ICD because she is Class IV.   We need to explore end of life decisions. 3. Valvular heart disease. Has evidence of aortic valve stenosis. Ask Dr. Melania Pagan to see patient tomorrow to discuss possible options. 4.  continue toprol and richard, no ACE/ARB for TORIBIO  5. AF- on Amio, AC on hold for elevated INR    I have asked for her to be seen by palliative care. We clearly need to discuss end of life situation. She is not a candidate for LVAD due to valvular heart disease. Dr. Melania Pagan to see patient tomorrow.       I appreciate the opportunity of cooperating in the care of this individual.    Benjie De Jesus MD, [facility]  4/28/2021, 2:31 PM  Heart Failure  The [address], [address]  Ph: [phone]      Core Measures:   · Discharge instructions:   · LVEF documented:   · ACEI for LV dysfunction:   · Smoking Cessation:

## 2021-04-28 NOTE — PROGRESS NOTES
[facility]   Electrophysiology Progress Note   Date: 4/28/2021  Admit Date: 4/15/2021     Reason for follow up: Atrial fibrillation RVR    Chief Complaint:   Chief Complaint   Patient presents with    Chest Pain    Back Pain     History of Present Illness: History obtained from patient and medical record. Natalee Izaguirre is a 77 y.o. female with a past medical history of HTN, HLD, DM, CAD s/p CABG x 3, S/p bioprosthetic MVR, WAYNE clip (8/2018), afib s/p Maze 2018, CMP (EF25-30%) DVT/PE on warfarin and sCHF follows with HF. S/p ablation of afib w/ PVI, roofline, posterior, CTI flutter 10/30/2019. S/p ILR. Hx of Mobitz I AVB and prolonged NY interval. S/p JUDE/CV. Has been treated with amiodarone stopped in 2018. Presented with palpitations and tachycardia, found to be in afib/RVR and started on diltiazem gtt. Reportedly her carvedilol was reduced recently. Interval Hx: Today, she remains in afib/CVR. Continues on IV milrinone and IV lasix. No new complaints today. No major events overnight. Denies having chest pain, palpitations, or dizziness. Patient seen and examined. Clinical notes reviewed. Telemetry reviewed.      Problem List:   Patient Active Problem List    Diagnosis Date Noted    Acute on chronic systolic CHF (congestive heart failure) ([facility] [address] 04/26/2021    Hypoglycemia 04/26/2021    Atrial fibrillation with rapid ventricular response (HCC)     Chest pain     Dyspnea on exertion     Acute kidney injury superimposed on CKD ([address]     Hypotension     Acute on chronic systolic heart failure due to valvular disease ([address] 02/26/2021    Stage 3 chronic kidney disease     Coronary artery disease involving native heart without angina pectoris     Deep vein thrombosis (DVT) of non-extremity vein 12/15/2020    Aortic valve stenosis 11/03/2020    Pneumatosis intestinalis of small intestine 05/10/2020    Ischemic cardiomyopathy 01/20/2020    Occlusion and stenosis of bilateral carotid arteries 01/20/2020    Persistent atrial fibrillation ([facility] [address] 10/30/2019    S/P MVR (mitral valve repair)     Thoracic degenerative disc disease 06/07/2019    Chronic systolic CHF (congestive heart failure), NYHA class 3 ([address] 01/15/2019    Obesity, Class I, BMI 30-34.9     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 pulmonary nodules 08/01/2016    History of pulmonary embolism     B12 deficiency 09/08/2014    Paroxysmal SVT (supraventricular tachycardia) ([facility] [address] 86/36/0664    Eosinophilic gastritis 28/64/7307    Generalized osteoarthrosis, involving multiple sites 03/25/2013    Long term current use of anticoagulant 12/19/2012    Hypercholesteremia 12/19/2012      Assessment and Plan:  Persistent Atrial Fibrillation RVR   - Remains in afib/RVR   - On Coreg and amiodarone 400 mg bid    ~ Reduce amiodarone 200 mg bid x 7 days and then daily     ~ TSH and LFT OK   - S/p WAYNE clip 8/2018, no AC needed from afib standpoint   - Failed cardioversion 4/20/2021  Acute on Chronic systolic CHF   - HF following   - Remains on IV milrinone  Cardiomyopathy   - She has been treated with HF on OMT   - EF worsening 10-15% per echo this admission   - AICD implantation has been delayed due to decompensated HF stage IV on milrinone gtt. When she is optimized from HF standpoint AICD can be implanted for primary prevention when compensated.    This can be arranged as OP if necessary   CAD   - Stable   - No reports of angina   - Continue medical therapy  HTN   - Controlled. No medication changes, BP is soft    - Reduce amiodarone 200 mg daily   - ICD can be considered when optimized from HF perspective, long discussion with  and patient today  - EP will follow peripherally, please call with questions    All pertinent information and plan of care discussed with the EP physician. Multiple medical conditions with risk of decompensation. Allergies: Allergies   Allergen Reactions    Ebnxsynf-Qezbwdw-Jqczty [Fluocinolone] Shortness Of Breath    Ciprofloxacin Shortness Of Breath    Diovan [Valsartan] Shortness Of Breath    Flagyl [Metronidazole] Shortness Of Breath     Has taken diflucan at home 12/7/15    Metformin And Related [Metformin And Related] Shortness Of Breath    Benazepril      Other reaction(s): Not Recorded    Morphine      Bad reaction. \"makes her feel horrible\".  Saxagliptin      Other reaction(s): Not Recorded    Levaquin [Levofloxacin] Rash     Home Meds:  Prior to Visit Medications    Medication Sig Taking? Authorizing Provider   spironolactone (ALDACTONE) 25 MG tablet Take 2 tablets by mouth daily Yes SOLEDAD Selby - CNS   torsemide (DEMADEX) 20 MG tablet 40mg morning, 40mg afternoon, 20mg evening Yes Taylor Alexandra MD   carvedilol (COREG) 6.25 MG tablet Take 1 tablet by mouth daily Yes Taylor Alexandra MD   gabapentin (NEURONTIN) 300 MG capsule Take 1 capsule by mouth nightly for 90 days. Intended supply: 30 days Yes Graham Martini MD   insulin glargine (LANTUS SOLOSTAR) 100 UNIT/ML injection pen INJECT 26 UNITS IN THE MORNING AND 18 UNITS AT BEDTIME Yes Margot Stone MD   exenatide (BYETTA 10 MCG PEN) 10 MCG/0.04ML injection Inject 0.04 mLs into the skin 2 times daily (with meals) Yes Graham Martini MD   OXYCODONE HCL PO Take 10 mg by mouth As of 1/21/2021,  states patient is taking 10mg with edge being taken off her pain after 3 hours.  Yes Historical Provider, MD   ACETAMINOPHEN PO Take 500 mg by mouth every 6 hours as needed  Yes Historical Provider, MD   albuterol sulfate  (90 Base) MCG/ACT inhaler USE 2 INHALATIONS EVERY 6 HOURS AS NEEDED FOR WHEEZING Yes Margy Hinojosa MD   albuterol (PROVENTIL) (2.5 MG/3ML) 0.083% nebulizer solution Take 3 mLs by nebulization every 6 hours as needed for Wheezing Yes Margy Hinojosa MD   polyethylene glycol (GLYCOLAX) 17 GM/SCOOP powder Take 17 g by mouth every other day  Yes Historical Provider, MD   omeprazole (PRILOSEC) 20 MG delayed release capsule Take 20 mg by mouth daily Yes Historical Provider, MD   ondansetron (ZOFRAN) 4 MG tablet Take 1 tablet by mouth 3 times daily as needed for Nausea or Vomiting Yes Margy Hinojosa MD   aspirin 81 MG chewable tablet Take 1 tablet by mouth daily Yes Margy Hinojosa MD   vitamin B-12 500 MCG tablet Take 1 tablet by mouth daily Yes Gene Andrade MD   potassium chloride (KLOR-CON M) 10 MEQ extended release tablet TAKE 1 TABLET DAILY  Margy Hinojosa MD   predniSONE (DELTASONE) 10 MG tablet TAKE 1 TABLET AS NEEDED (EOSINOPHILIC GASATRITIS)  Margy Hinojosa MD   montelukast (SINGULAIR) 10 MG tablet TAKE 1 TABLET NIGHTLY  Margy Hinojosa MD   warfarin (COUMADIN) 5 MG tablet TAKE 1 TABLET DAILY  Margy Hinojosa MD   blood glucose test strips (FREESTYLE LITE) strip USE TO TEST FOUR TIMES A DAY  Margy Hinojosa MD   FreeStyle Lancets MISC 1 each by Does not apply route 4 times daily  Margy Hinojosa MD   Blood Glucose Monitoring Suppl (EMBRACE PRO GLUCOSE METER) GAETANO 1 Device by Does not apply route 4 times daily  Margy Hinojosa MD   HUMALOG KWIKPEN 100 UNIT/ML SOPN TAKE AS INSTRUCTED BY YOUR PRESCRIBER  Patient taking differently:  Only if high blood sugar-has not been using  Margy Hinojosa MD   nystatin (MYCOSTATIN) 626607 UNIT/ML suspension TAKE ONE TEASPOONFUL BY MOUTH FOUR TIMES A DAY FOR 5 DAYS  Margy Hinojosa MD   BD PEN NEEDLE JANNET U/F 32G X 4 MM MISC USE 1 PEN NEEDLE FOUR TIMES A DAY  Graham Martini MD      Scheduled Meds:   naphazoline-pheniramine  1 drop Both Eyes BID    warfarin  2.5 mg Oral Daily    polyethylene glycol  17 g Oral BID    lidocaine 1 % injection  5 mL Intradermal Once    sodium chloride flush  5-40 mL Intravenous 2 times per day    spironolactone  12.5 mg Oral Daily    potassium chloride  40 mEq Oral BID WC    metoprolol succinate  25 mg Oral Daily    aspirin  81 mg Oral Daily    gabapentin  300 mg Oral Nightly    montelukast  10 mg Oral Nightly    pantoprazole  40 mg Oral QAM AC    sodium chloride flush  5-40 mL Intravenous 2 times per day    insulin lispro  0-12 Units Subcutaneous TID WC    insulin lispro  0-6 Units Subcutaneous Nightly    amiodarone  400 mg Oral BID     Continuous Infusions:   sodium chloride      furosemide (LASIX) 1mg/ml infusion 15 mg/hr (21)    milrinone 0.2 mcg/kg/min (21)    sodium chloride      dextrose       PRN Meds:bisacodyl, diphenhydrAMINE, oxyCODONE-acetaminophen, sodium chloride flush, sodium chloride, oxyCODONE, albuterol, sodium chloride flush, sodium chloride, promethazine **OR** ondansetron, polyethylene glycol, acetaminophen **OR** acetaminophen, glucose, dextrose, glucagon (rDNA), dextrose   Past Medical History:  Past Medical History:   Diagnosis Date    Asthma     Atrial fibrillation (HCC)     Eosinophilia     Hemoptysis     HIGH CHOLESTEROL     Hx of blood clots     Hypertension     Irregular heart beat     Other specified gastritis without mention of hemorrhage     Palpitations     Skin cancer     basal and squamous    Type II or unspecified type diabetes mellitus without mention of complication, not stated as uncontrolled       Past Surgical History:    has a past surgical history that includes Cholecystectomy;  section; Colonoscopy (2017); skin biopsy; bronchoscopy (2016);  Coronary artery bypass graft (08/21/2018); Mitral valve replacement (08/21/2018); transesophageal echocardiogram (08/21/2018); Tunneled venous catheter placement (Left, 08/23/2018); Cardiac catheterization (08/16/2018); Insertable Cardiac Monitor (Left, 08/16/2018); Colonoscopy (01/17/2014); Hysterectomy; Upper gastrointestinal endoscopy (N/A, 10/10/2018); Colonoscopy (10/10/2018); Colonoscopy (N/A, 8/7/2020); Pain management procedure (N/A, 12/18/2020); and Pain management procedure (Bilateral, 1/18/2021). Social History:  Reviewed. reports that she has never smoked. She has never used smokeless tobacco. She reports that she does not drink alcohol or use drugs. Family History:  Reviewed. family history includes Asthma in her mother; Cancer in her father; Heart Disease in her mother; High Blood Pressure in her mother; Hypertension in her mother. Denies family history of sudden cardiac death, arrhythmia, premature CAD    Review of Systems:  · Constitutional: Negative for fever + generalized weakness  · Skin: Negative for bruising, bleeding, + BLE redness  · HEENT: Negative for vision changes  · Respiratory: Reviewed in HPI  · Cardiovascular: Reviewed in HPI  · Gastrointestinal: Negative for abdominal pain, N/V/D, constipation, or black/tarry stools  · Genito-Urinary: Negative for hematuria  · Musculoskeletal: No focal weakness  · Neurological/Psych: Negative for confusion or TIA-like symptoms. No anxiety, depression, or insomnia    Physical Examination:  Vitals:    04/28/21 0700   BP:    Pulse:    Resp: 16   Temp:    SpO2: 92%      In: 1705.8 [P.O.:1277;  I.V.:428.8]  Out: 1175    Wt Readings from Last 3 Encounters:   04/27/21 210 lb 11.2 oz (95.6 kg)   04/13/21 205 lb (93 kg)   04/02/21 206 lb 9.6 oz (93.7 kg)       Intake/Output Summary (Last 24 hours) at 4/28/2021 0818  Last data filed at 4/28/2021 6146  Gross per 24 hour   Intake 1771.62 ml   Output 2200 ml   Net -428.38 ml     Telemetry: Personally Reviewed Atrial fibrillation w/ RVR  · Constitutional: Cooperative and in no apparent distress, and appears well nourished  · Skin: Warm and pink; no cyanosis, bruising, or clubbing  · HEENT: Symmetric and normocephalic. Conjunctiva pink with clear sclera. Mucus membranes pink and moist.   · Cardiovascular: irregular and rhythm. S1 & S2. Peripheral pulses 2+, capillary refill < 3 seconds. negative elevation of JVP. + BLE edema 1-2+, legs wrapped, systolic murmur  · Respiratory: Respirations symmetric and unlabored. Lungs to auscultation bilaterally, no wheezing, or rhonchi + fine crackles BLL  · Gastrointestinal: Abdomen soft and round. Bowel sounds normoactive in all quadrants. · Musculoskeletal: No focal weakness. · Neurologic/Psych: Awake and orientated to person, place and time. Calm affect, appropriate mood    Pertinent labs, diagnostic, device, and imaging results reviewed as a part of this visit    Labs:    BMP:   Recent Labs     04/26/21 0431 04/27/21 0420 04/28/21 0429    136 137   K 3.7 3.2* 3.3*    99 101   CO2 28 26 26   PHOS 3.5 3.6 3.7   BUN 26* 26* 26*   CREATININE 1.6* 1.3* 1.3*   MG 1.80 1.80 1.70*     Estimated Creatinine Clearance: 46 mL/min (A) (based on SCr of 1.3 mg/dL (H)).    CBC:   Recent Labs     04/27/21 0420 04/28/21 0430   WBC 4.7 4.4   HGB 9.7* 9.5*   HCT 30.9* 29.6*   MCV 84.9 84.8    227     Thyroid:   Lab Results   Component Value Date    TSH 1.90 04/01/2021     Lipids:   Lab Results   Component Value Date    CHOL 149 12/23/2020    HDL 40 12/23/2020    TRIG 94 03/03/2021     LFTS:   Lab Results   Component Value Date    ALT 10 04/15/2021    AST 15 04/15/2021    ALKPHOS 215 04/15/2021    PROT 6.6 04/15/2021    AGRATIO 0.8 04/15/2021    BILITOT 0.6 04/15/2021     Cardiac Enzymes:   Lab Results   Component Value Date    TROPONINI 0.02 04/15/2021    TROPONINI 0.02 04/15/2021    TROPONINI <0.01 05/10/2020     Coags:   Lab Results   Component Value Date    PROTIME 29.5 04/28/2021 INR 2.52 2021     EC/15/2021: afib/RVR    ECHO:  2021  Summary   Overall left ventricular systolic function is severely depressed . Ejection fraction is visually estimated to be 10-15 %. E/e'= 23.2 GLS=-3.4   Moderately dilated left ventricle. There is severe diffuse hypokinesis. Indeterminate diastolic function. A bioprosthetic mitral valve is well seated with peak velocity of 1.59m/s   and a mean gradient of 3mmHg. The left atrium is moderately dilated. Mild aortic stenosis with a peak velocity of 2.5m/s and a mean pressure gradient of 14mmHg. The aortic valve is thickened/calcified with decreased leaflet mobility   consistent with aortic stenosis. Mild to moderate tricuspid regurgitation. Estimated pulmonary artery systolic pressure is mildly to moderately elevated at 46 mmHg assuming a right atrial pressure of 8 mmHg. 2020  Summary   Dobutamine echocardiogram for aortic stenosis. Very technically limited exam due to atrial fibrillation. Baseline echocardiogram shows severe left ventricular dysfunction with   anterior, lateral and apical hypokinesis with an ejection fraction of 20-25   %. There is no improvement in wall motion with low or high dose dobutamine   suggestive of nonviable myocardium. Mild prosthetic aortic valve stenosis with a mean gradient of 16mmHg and   peak velocity of 2.47m/s at rest. After dobutamine stress the mean AV   gradient rises to 20mmHg with 20mcg of dobutamine consistent with mild to   moderate aortic stenosis. Prosthetic mitral valve with a mean gradient of 7mmHg  9/15/2020  Summary   Left ventricular cavity size is dilated. There is normal wall thickness with a moderately severe reduction in   systolic function. LV ejection fraction is visually estimated to be 25-30%. Indeterminate diastolic function. The right ventricle is not well visualized but appears to be normal in size   with moderately reduced function.    The left atrium is moderately dilated. A bioprosthetic mitral valve with a mean gradient of 7 mmHg. This may be a   normal gradient for this valve but could suggest mild stenosis. Trivial mitral regurgitation. The aortic valve is thickened/calcified with a mean gradient of 16 mmHg   consistent with at least mild aortic stenosis. This is likely underestimated   due to low cardiac output secondary to LV dysfunction. No significant aortic valve regurgitation. Moderate tricuspid regurgitation with RVSP of 48 mmHg. Stress Test: 8/7/2018  Summary    Small sized lateral completely reversible defect of mild intensity    consistent with ischemia in the territory of the LCx and/or LAD .    Normal LV function.    Overall findings represent a intermediate risk scan.         All questions and concerns were addressed to the patient. Alternatives to my treatment were discussed. I have discussed the above stated plan with patient and the nurse. The patient verbalized understanding and agreed with the plan. Thank you for allowing to us to participate in the care of Anthony Owusu. SOLEDAD L[address]-JOSE [address]   Office: [phone]    I have spent 35 minutes of face to face time with the patient with more than 50% spent counseling and coordinating care for Anthony Owusu.

## 2021-04-28 NOTE — ACP (ADVANCE CARE PLANNING)
Advanced Care Planning Note. Purpose of Encounter: Advanced care planning in light of acute on chronic systolic CHF  Parties In Attendance: Patient,   Decisional Capacity: Yes  Subjective: Patient with BL LE edema and SOB  Objective: Cr 1.3  Goals of Care Determination: Patient wants full support (CPR, vent, surgery, HD, trach, PEG)  Plan:  Lasix and Milrinone gtt. Cardio and Renal consults. Hospice consult  Code Status: Full code   Time spent on Advanced care Plannin minutes  Advanced Care Planning Documents: Completed advanced directives on chart,  is the POA.     Diane Velez MD  2021 4:21 PM

## 2021-04-28 NOTE — CARE COORDINATION
Chart reviewed for discharge planning. Barrier(s) to discharge- On milrinone and Lasix drip. Patient getting possible AICD   Tentative discharge plan- home with spouse  Tentative discharge date- TBD    *Case management will continue to follow progress and update discharge plan as needed.     Naima Torres MSW, [address]

## 2021-04-28 NOTE — PLAN OF CARE
C/o pain, it is across her shoulders concentrated on her right shoulder and neck. Medicated with percocet at 2000. Pain level 8 to 7. Oxycodone given at 0030 for same pain. Vitals remain stable. Abdomen less distended. Had BM with moderate amount of liquid. This caused her to feel weak and slightly diaphoretic. Blood sugar was 152. She has no appetite and did not eat any dinner. Sliding scale insulin held at HS. Will place dietary consult for supplement recommendations. Continues to ambulate safely and monitor I/O.         Problem: Pain:  Goal: Control of acute pain  Description: Control of acute pain  Outcome: Ongoing     Problem: Cardiovascular  Goal: No DVT, peripheral vascular complications  Outcome: Ongoing  Goal: Hemodynamic stability  Outcome: Ongoing  Goal: Anticoagulate/Hct stable  Outcome: Ongoing     Problem: Respiratory  Goal: No pulmonary complications  Outcome: Ongoing  Goal: O2 Sat > 90%  Outcome: Ongoing     Problem: Serum Glucose Level - Abnormal:  Goal: Ability to maintain appropriate glucose levels has stabilized  Description: Ability to maintain appropriate glucose levels has stabilized  Outcome: Ongoing

## 2021-04-28 NOTE — PROGRESS NOTES
SCD  Disposition Home with home PT/OT/VNS/HHA/SW    Discussed with patient, Dr Bethany Lynn (Cardio), Cole Ponce (Cardio NP), nursing and CM. D/W  at bedside. I am extremely concerned her cardiomyopathy is terminal.  She seems to be in denial about condition and prognosis. Her  seems to agree with her. I do not believe she is able to lay flat to place ICD. She is high risk of being a recurrent, more frequent admission for CHF despite ICD. I have frankly expressed my concern to her and she was not happy with my assessment. Dr Bethany Lynn is going to discuss with patient and  today. If she does not improve diuresis with Zaroxolyn, I do not feel she will be ideal HD candidate given CMP. Will see what all think. Brief Course:    Patient admitted on 15 April with chest pain and back pain found to have A. fib with RVR along with heart failure. EF of about 15%. On milrinone and Lasix drip. Patient getting possible AICD during the stay. But sugar slightly on the lower side during the stay had to cut back on the Lantus dosing. CC: Fluid overload    Subjective:  . Patient sitting in chair. Ongoing BL LE edema. No CP, HA or abdominal pain. Improving SOB. -6.4 L since admission (net neutral since yesterday). Cannot lay flat.       Current Medications  metOLazone (ZAROXOLYN) tablet 5 mg, Daily  [START ON 4/29/2021] amiodarone (CORDARONE) tablet 200 mg, Daily  naphazoline-pheniramine (NAPHCON-A) 0.025-0.3 % ophthalmic solution 1 drop, BID  bisacodyl (DULCOLAX) suppository 10 mg, Daily PRN  polyethylene glycol (GLYCOLAX) packet 17 g, BID  diphenhydrAMINE (BENADRYL) tablet 25 mg, Q6H PRN  oxyCODONE-acetaminophen (PERCOCET)  MG per tablet 1 tablet, Q6H PRN  lidocaine PF 1 % injection 5 mL, Once  sodium chloride flush 0.9 % injection 5-40 mL, 2 times per day  sodium chloride flush 0.9 % injection 5-40 mL, PRN  0.9 % sodium chloride infusion, PRN  spironolactone (ALDACTONE) tablet 12.5 mg, Daily  oxyCODONE (ROXICODONE) immediate release tablet 5 mg, Q4H PRN  potassium chloride (KLOR-CON M) extended release tablet 40 mEq, BID WC  furosemide (LASIX) 100 mg in dextrose 5 % 100 mL infusion, Continuous  milrinone (PRIMACOR) 20 mg in dextrose 5 % 100 mL infusion, Continuous  metoprolol succinate (TOPROL XL) extended release tablet 25 mg, Daily  albuterol (PROVENTIL) nebulizer solution 2.5 mg, Q6H PRN  aspirin chewable tablet 81 mg, Daily  gabapentin (NEURONTIN) capsule 300 mg, Nightly  montelukast (SINGULAIR) tablet 10 mg, Nightly  pantoprazole (PROTONIX) tablet 40 mg, QAM AC  sodium chloride flush 0.9 % injection 5-40 mL, 2 times per day  sodium chloride flush 0.9 % injection 5-40 mL, PRN  0.9 % sodium chloride infusion, PRN  promethazine (PHENERGAN) tablet 12.5 mg, Q6H PRN    Or  ondansetron (ZOFRAN) injection 4 mg, Q6H PRN  polyethylene glycol (GLYCOLAX) packet 17 g, Daily PRN  acetaminophen (TYLENOL) tablet 650 mg, Q6H PRN    Or  acetaminophen (TYLENOL) suppository 650 mg, Q6H PRN  insulin lispro (1 Unit Dial) 0-12 Units, TID WC  insulin lispro (1 Unit Dial) 0-6 Units, Nightly  glucose (GLUTOSE) 40 % oral gel 15 g, PRN  dextrose 50 % IV solution, PRN  glucagon (rDNA) injection 1 mg, PRN  dextrose 5 % solution, PRN        Objective:  /75   Pulse 87   Temp 97.4 °F (36.3 °C) (Oral)   Resp 20   Ht 5' 8\" (1.727 m)   Wt 209 lb 9.6 oz (95.1 kg)   SpO2 95%   BMI 31.87 kg/m²     Intake/Output Summary (Last 24 hours) at 4/28/2021 1615  Last data filed at 4/28/2021 1508  Gross per 24 hour   Intake 2506.72 ml   Output 2480 ml   Net 26.72 ml      Wt Readings from Last 3 Encounters:   04/28/21 209 lb 9.6 oz (95.1 kg)   04/13/21 205 lb (93 kg)   04/02/21 206 lb 9.6 oz (93.7 kg)   3+ BL LE edema  Obese  General appearance:  Appears comfortable  Eyes: Sclera clear. Pupils equal.  ENT: Moist oral mucosa. Trachea midline, no adenopathy. Cardiovascular: Regular rhythm, normal S1, S2.  No murmur. Respiratory: Not using accessory muscles. Good inspiratory effort. Clear to auscultation bilaterally, no wheeze or crackles. GI: Abdomen soft, no tenderness, not distended, normal bowel sounds  Musculoskeletal: No cyanosis in digits, neck supple  Neurology: CN 2-12 grossly intact. No speech or motor deficits  Psych: Normal affect. Alert and oriented in time, place and person  Skin: Warm, dry, normal turgor  Extremity exam shows brisk capillary refill. Peripheral pulses are palpable in lower extremities     Labs and Tests:  CBC:   Recent Labs     04/27/21 0420 04/28/21 0430   WBC 4.7 4.4   HGB 9.7* 9.5*    227     BMP:    Recent Labs     04/26/21 0431 04/27/21 0420 04/28/21 0429    136 137   K 3.7 3.2* 3.3*    99 101   CO2 28 26 26   BUN 26* 26* 26*   CREATININE 1.6* 1.3* 1.3*   GLUCOSE 104* 95 121*     Hepatic: No results for input(s): AST, ALT, ALB, BILITOT, ALKPHOS in the last 72 hours. XR CHEST PORTABLE   Final Result   No acute cardiopulmonary disease. Stable cardiomegaly with no evidence of   overt failure.                Chavo Soliman MD   4/28/2021 4:15 PM

## 2021-04-29 LAB
ALBUMIN SERPL-MCNC: 3 G/DL (ref 3.4–5)
ANION GAP SERPL CALCULATED.3IONS-SCNC: 10 MMOL/L (ref 3–16)
BASOPHILS ABSOLUTE: 0 K/UL (ref 0–0.2)
BASOPHILS RELATIVE PERCENT: 0.4 %
BUN BLDV-MCNC: 23 MG/DL (ref 7–20)
CALCIUM SERPL-MCNC: 8.9 MG/DL (ref 8.3–10.6)
CHLORIDE BLD-SCNC: 94 MMOL/L (ref 99–110)
CO2: 31 MMOL/L (ref 21–32)
CREAT SERPL-MCNC: 1.3 MG/DL (ref 0.6–1.2)
EOSINOPHILS ABSOLUTE: 0.2 K/UL (ref 0–0.6)
EOSINOPHILS RELATIVE PERCENT: 5.3 %
GFR AFRICAN AMERICAN: 48
GFR NON-AFRICAN AMERICAN: 40
GLUCOSE BLD-MCNC: 137 MG/DL (ref 70–99)
GLUCOSE BLD-MCNC: 140 MG/DL (ref 70–99)
GLUCOSE BLD-MCNC: 146 MG/DL (ref 70–99)
GLUCOSE BLD-MCNC: 174 MG/DL (ref 70–99)
GLUCOSE BLD-MCNC: 182 MG/DL (ref 70–99)
HCT VFR BLD CALC: 32.3 % (ref 36–48)
HEMOGLOBIN: 10.3 G/DL (ref 12–16)
INR BLD: 2.18 (ref 0.86–1.14)
LYMPHOCYTES ABSOLUTE: 0.6 K/UL (ref 1–5.1)
LYMPHOCYTES RELATIVE PERCENT: 13 %
MAGNESIUM: 1.8 MG/DL (ref 1.8–2.4)
MCH RBC QN AUTO: 26.6 PG (ref 26–34)
MCHC RBC AUTO-ENTMCNC: 31.8 G/DL (ref 31–36)
MCV RBC AUTO: 83.4 FL (ref 80–100)
MONOCYTES ABSOLUTE: 0.7 K/UL (ref 0–1.3)
MONOCYTES RELATIVE PERCENT: 16.5 %
NEUTROPHILS ABSOLUTE: 2.8 K/UL (ref 1.7–7.7)
NEUTROPHILS RELATIVE PERCENT: 64.8 %
PDW BLD-RTO: 17.3 % (ref 12.4–15.4)
PERFORMED ON: ABNORMAL
PHOSPHORUS: 3.3 MG/DL (ref 2.5–4.9)
PLATELET # BLD: 240 K/UL (ref 135–450)
PMV BLD AUTO: 8.1 FL (ref 5–10.5)
POTASSIUM SERPL-SCNC: 2.8 MMOL/L (ref 3.5–5.1)
POTASSIUM SERPL-SCNC: 3.4 MMOL/L (ref 3.5–5.1)
POTASSIUM SERPL-SCNC: 3.4 MMOL/L (ref 3.5–5.1)
PROTHROMBIN TIME: 25.5 SEC (ref 10–13.2)
RBC # BLD: 3.87 M/UL (ref 4–5.2)
SODIUM BLD-SCNC: 135 MMOL/L (ref 136–145)
WBC # BLD: 4.3 K/UL (ref 4–11)

## 2021-04-29 PROCEDURE — 80069 RENAL FUNCTION PANEL: CPT

## 2021-04-29 PROCEDURE — 85610 PROTHROMBIN TIME: CPT

## 2021-04-29 PROCEDURE — 6360000002 HC RX W HCPCS: Performed by: INTERNAL MEDICINE

## 2021-04-29 PROCEDURE — 85025 COMPLETE CBC W/AUTO DIFF WBC: CPT

## 2021-04-29 PROCEDURE — 6360000002 HC RX W HCPCS: Performed by: NURSE PRACTITIONER

## 2021-04-29 PROCEDURE — 84132 ASSAY OF SERUM POTASSIUM: CPT

## 2021-04-29 PROCEDURE — 99233 SBSQ HOSP IP/OBS HIGH 50: CPT | Performed by: NURSE PRACTITIONER

## 2021-04-29 PROCEDURE — 2580000003 HC RX 258: Performed by: INTERNAL MEDICINE

## 2021-04-29 PROCEDURE — 2580000003 HC RX 258: Performed by: NURSE PRACTITIONER

## 2021-04-29 PROCEDURE — 36415 COLL VENOUS BLD VENIPUNCTURE: CPT

## 2021-04-29 PROCEDURE — 6370000000 HC RX 637 (ALT 250 FOR IP): Performed by: INTERNAL MEDICINE

## 2021-04-29 PROCEDURE — 83735 ASSAY OF MAGNESIUM: CPT

## 2021-04-29 PROCEDURE — C1751 CATH, INF, PER/CENT/MIDLINE: HCPCS

## 2021-04-29 PROCEDURE — 6370000000 HC RX 637 (ALT 250 FOR IP): Performed by: NURSE PRACTITIONER

## 2021-04-29 PROCEDURE — 2060000000 HC ICU INTERMEDIATE R&B

## 2021-04-29 PROCEDURE — 36569 INSJ PICC 5 YR+ W/O IMAGING: CPT

## 2021-04-29 PROCEDURE — 99232 SBSQ HOSP IP/OBS MODERATE 35: CPT | Performed by: NURSE PRACTITIONER

## 2021-04-29 RX ORDER — SODIUM CHLORIDE 0.9 % (FLUSH) 0.9 %
5-40 SYRINGE (ML) INJECTION EVERY 12 HOURS SCHEDULED
Status: DISCONTINUED | OUTPATIENT
Start: 2021-04-29 | End: 2021-04-29 | Stop reason: SDUPTHER

## 2021-04-29 RX ORDER — SODIUM CHLORIDE 9 MG/ML
25 INJECTION, SOLUTION INTRAVENOUS PRN
Status: DISCONTINUED | OUTPATIENT
Start: 2021-04-29 | End: 2021-05-05 | Stop reason: HOSPADM

## 2021-04-29 RX ORDER — LIDOCAINE HYDROCHLORIDE 10 MG/ML
5 INJECTION, SOLUTION EPIDURAL; INFILTRATION; INTRACAUDAL; PERINEURAL ONCE
Status: DISCONTINUED | OUTPATIENT
Start: 2021-04-29 | End: 2021-05-03

## 2021-04-29 RX ORDER — SODIUM CHLORIDE 0.9 % (FLUSH) 0.9 %
5-40 SYRINGE (ML) INJECTION PRN
Status: DISCONTINUED | OUTPATIENT
Start: 2021-04-29 | End: 2021-04-29 | Stop reason: SDUPTHER

## 2021-04-29 RX ORDER — POTASSIUM CHLORIDE 29.8 MG/ML
20 INJECTION INTRAVENOUS PRN
Status: DISCONTINUED | OUTPATIENT
Start: 2021-04-29 | End: 2021-05-05 | Stop reason: HOSPADM

## 2021-04-29 RX ORDER — POTASSIUM CHLORIDE 20 MEQ/1
40 TABLET, EXTENDED RELEASE ORAL
Status: DISCONTINUED | OUTPATIENT
Start: 2021-04-29 | End: 2021-04-29

## 2021-04-29 RX ADMIN — AMIODARONE HYDROCHLORIDE 200 MG: 200 TABLET ORAL at 10:04

## 2021-04-29 RX ADMIN — ONDANSETRON 4 MG: 2 INJECTION INTRAMUSCULAR; INTRAVENOUS at 22:32

## 2021-04-29 RX ADMIN — OXYCODONE AND ACETAMINOPHEN 1 TABLET: 325; 10 TABLET ORAL at 08:49

## 2021-04-29 RX ADMIN — POTASSIUM CHLORIDE 20 MEQ: 29.8 INJECTION, SOLUTION INTRAVENOUS at 23:57

## 2021-04-29 RX ADMIN — INSULIN LISPRO 2 UNITS: 100 INJECTION, SOLUTION INTRAVENOUS; SUBCUTANEOUS at 12:41

## 2021-04-29 RX ADMIN — FUROSEMIDE 15 MG/HR: 10 INJECTION, SOLUTION INTRAMUSCULAR; INTRAVENOUS at 21:34

## 2021-04-29 RX ADMIN — POTASSIUM CHLORIDE 20 MEQ: 29.8 INJECTION, SOLUTION INTRAVENOUS at 11:29

## 2021-04-29 RX ADMIN — Medication 10 ML: at 11:30

## 2021-04-29 RX ADMIN — POLYETHYLENE GLYCOL 3350 17 G: 17 POWDER, FOR SOLUTION ORAL at 10:04

## 2021-04-29 RX ADMIN — MONTELUKAST SODIUM 10 MG: 10 TABLET, FILM COATED ORAL at 21:03

## 2021-04-29 RX ADMIN — POTASSIUM CHLORIDE 20 MEQ: 29.8 INJECTION, SOLUTION INTRAVENOUS at 13:46

## 2021-04-29 RX ADMIN — ONDANSETRON 4 MG: 2 INJECTION INTRAMUSCULAR; INTRAVENOUS at 16:32

## 2021-04-29 RX ADMIN — ASPIRIN 81 MG: 81 TABLET, CHEWABLE ORAL at 10:04

## 2021-04-29 RX ADMIN — FUROSEMIDE 15 MG/HR: 10 INJECTION, SOLUTION INTRAMUSCULAR; INTRAVENOUS at 14:50

## 2021-04-29 RX ADMIN — POTASSIUM CHLORIDE 40 MEQ: 1500 TABLET, EXTENDED RELEASE ORAL at 18:20

## 2021-04-29 RX ADMIN — Medication 10 ML: at 10:19

## 2021-04-29 RX ADMIN — POTASSIUM CHLORIDE 20 MEQ: 29.8 INJECTION, SOLUTION INTRAVENOUS at 19:21

## 2021-04-29 RX ADMIN — POTASSIUM CHLORIDE 20 MEQ: 29.8 INJECTION, SOLUTION INTRAVENOUS at 18:19

## 2021-04-29 RX ADMIN — POLYETHYLENE GLYCOL 3350 17 G: 17 POWDER, FOR SOLUTION ORAL at 21:03

## 2021-04-29 RX ADMIN — OXYCODONE AND ACETAMINOPHEN 1 TABLET: 325; 10 TABLET ORAL at 22:32

## 2021-04-29 RX ADMIN — NAPHAZOLINE HYDROCHLORIDE AND PHENIRAMINE MALEATE 1 DROP: .25; 3 SOLUTION/ DROPS OPHTHALMIC at 21:04

## 2021-04-29 RX ADMIN — MILRINONE LACTATE 0.2 MCG/KG/MIN: 200 INJECTION, SOLUTION INTRAVENOUS at 16:32

## 2021-04-29 RX ADMIN — METOPROLOL SUCCINATE 25 MG: 25 TABLET, EXTENDED RELEASE ORAL at 10:04

## 2021-04-29 RX ADMIN — POTASSIUM CHLORIDE 20 MEQ: 29.8 INJECTION, SOLUTION INTRAVENOUS at 23:07

## 2021-04-29 RX ADMIN — NAPHAZOLINE HYDROCHLORIDE AND PHENIRAMINE MALEATE 1 DROP: .25; 3 SOLUTION/ DROPS OPHTHALMIC at 10:06

## 2021-04-29 RX ADMIN — POTASSIUM CHLORIDE 20 MEQ: 29.8 INJECTION, SOLUTION INTRAVENOUS at 12:32

## 2021-04-29 RX ADMIN — METOLAZONE 5 MG: 2.5 TABLET ORAL at 10:13

## 2021-04-29 RX ADMIN — INSULIN LISPRO 2 UNITS: 100 INJECTION, SOLUTION INTRAVENOUS; SUBCUTANEOUS at 18:20

## 2021-04-29 RX ADMIN — OXYCODONE AND ACETAMINOPHEN 1 TABLET: 325; 10 TABLET ORAL at 16:32

## 2021-04-29 RX ADMIN — SPIRONOLACTONE 12.5 MG: 25 TABLET ORAL at 10:04

## 2021-04-29 RX ADMIN — Medication 10 ML: at 10:20

## 2021-04-29 RX ADMIN — POTASSIUM CHLORIDE 40 MEQ: 1500 TABLET, EXTENDED RELEASE ORAL at 11:23

## 2021-04-29 RX ADMIN — FUROSEMIDE 15 MG/HR: 10 INJECTION, SOLUTION INTRAMUSCULAR; INTRAVENOUS at 06:37

## 2021-04-29 RX ADMIN — PANTOPRAZOLE SODIUM 40 MG: 40 TABLET, DELAYED RELEASE ORAL at 10:04

## 2021-04-29 ASSESSMENT — PAIN DESCRIPTION - ORIENTATION: ORIENTATION: RIGHT

## 2021-04-29 ASSESSMENT — PAIN SCALES - GENERAL
PAINLEVEL_OUTOF10: 6
PAINLEVEL_OUTOF10: 8
PAINLEVEL_OUTOF10: 9

## 2021-04-29 ASSESSMENT — PAIN DESCRIPTION - PROGRESSION: CLINICAL_PROGRESSION: GRADUALLY IMPROVING

## 2021-04-29 ASSESSMENT — PAIN DESCRIPTION - LOCATION
LOCATION: FLANK
LOCATION: SHOULDER;BACK
LOCATION: FLANK

## 2021-04-29 ASSESSMENT — PAIN - FUNCTIONAL ASSESSMENT: PAIN_FUNCTIONAL_ASSESSMENT: PREVENTS OR INTERFERES SOME ACTIVE ACTIVITIES AND ADLS

## 2021-04-29 ASSESSMENT — PAIN DESCRIPTION - ONSET: ONSET: ON-GOING

## 2021-04-29 ASSESSMENT — PAIN DESCRIPTION - FREQUENCY: FREQUENCY: CONTINUOUS

## 2021-04-29 ASSESSMENT — PAIN DESCRIPTION - PAIN TYPE
TYPE: ACUTE PAIN
TYPE: ACUTE PAIN

## 2021-04-29 NOTE — PROGRESS NOTES
[address]  HEART FAILURE  Progress Note      Admit Date 4/15/2021     Reason for Consult:      Reason for Consultation/Chief Complaint: chest pain    HPI:    Teddy Delatorre is a 77 y.o. female with PMH CAD, CABG/Maze 2018, AF, WAYNE clip 2018, s/p Ablation, ILR, HTN, HLD, DVT, PE, ICM, HFrEF admitted with tachycardia, was in AF with RVR, dilt gtt started and unsuccessful CVx2. Echo with very low EF 10-15% and she was scheduled for ICD but INR was elevated. She has been on lasix gtt and milrinone,  lasix up to 15mghr and now has diuresed with metolazone added yesterday. Dr. Olga Tilley consulted to [facility] for TAVR      Subjective:  Patient is being seen for CHF, ICM. There were no acute overnight cardiac events. Today Ms. Ronak Rivera c/o continue SOB, abd distension and edema. She denies CP or palpitations and states SOB, edema and AD are finally starting to improve. Baseline Weight:    Wt Readings from Last 3 Encounters:   04/29/21 196 lb 11.2 oz (89.2 kg)   04/13/21 205 lb (93 kg)   04/02/21 206 lb 9.6 oz (93.7 kg)          Objective:   /70   Pulse 89   Temp 98.2 °F (36.8 °C) (Oral)   Resp 16   Ht 5' 8\" (1.727 m)   Wt 196 lb 11.2 oz (89.2 kg)   SpO2 91%   BMI 29.91 kg/m²       Intake/Output Summary (Last 24 hours) at 4/29/2021 1022  Last data filed at 4/29/2021 1020  Gross per 24 hour   Intake 2368.37 ml   Output 7110 ml   Net -4741.63 ml      Wt Readings from Last 3 Encounters:   04/29/21 196 lb 11.2 oz (89.2 kg)   04/13/21 205 lb (93 kg)   04/02/21 206 lb 9.6 oz (93.7 kg)      In: 1807.5 [P.O.:1400;  I.V.:407.5]  Out: 6155       Physical Exam:  General Appearance:  Non-obese/Well Nourished  Respiratory:  · Resp Auscultation: bilateral crackles  Cardiovascular:  · Auscultation: Regular rate and rhythm, normal S1S2, +murmur  · Palpation: Normal    · Pedal Pulses: 2+ and equal   Abdomen:   distended, firm,   Extremities:  · No Cyanosis or Clubbing  · Extremities: 1+ edema, erythema Neurological/Psychiatric:  · Oriented to time, place, and person  · Non-anxious    MEDICATIONS:   Scheduled Meds:   Scheduled Meds:   lidocaine 1 % injection  5 mL Intradermal Once    sodium chloride flush  5-40 mL Intravenous 2 times per day    metOLazone  5 mg Oral Daily    amiodarone  200 mg Oral Daily    naphazoline-pheniramine  1 drop Both Eyes BID    polyethylene glycol  17 g Oral BID    lidocaine 1 % injection  5 mL Intradermal Once    sodium chloride flush  5-40 mL Intravenous 2 times per day    spironolactone  12.5 mg Oral Daily    potassium chloride  40 mEq Oral BID WC    metoprolol succinate  25 mg Oral Daily    aspirin  81 mg Oral Daily    gabapentin  300 mg Oral Nightly    montelukast  10 mg Oral Nightly    pantoprazole  40 mg Oral QAM AC    sodium chloride flush  5-40 mL Intravenous 2 times per day    insulin lispro  0-12 Units Subcutaneous TID WC    insulin lispro  0-6 Units Subcutaneous Nightly     Continuous Infusions:   sodium chloride      sodium chloride      furosemide (LASIX) 1mg/ml infusion 15 mg/hr (04/29/21 1016)    milrinone 0.2 mcg/kg/min (04/29/21 1016)    sodium chloride      dextrose       PRN Meds:.sodium chloride flush, sodium chloride, potassium chloride, bisacodyl, diphenhydrAMINE, oxyCODONE-acetaminophen, sodium chloride flush, sodium chloride, oxyCODONE, albuterol, sodium chloride flush, sodium chloride, promethazine **OR** ondansetron, polyethylene glycol, acetaminophen **OR** acetaminophen, glucose, dextrose, glucagon (rDNA), dextrose  Continuous Infusions:   sodium chloride      sodium chloride      furosemide (LASIX) 1mg/ml infusion 15 mg/hr (04/29/21 1016)    milrinone 0.2 mcg/kg/min (04/29/21 1016)    sodium chloride      dextrose         Intake/Output Summary (Last 24 hours) at 4/29/2021 1022  Last data filed at 4/29/2021 1020  Gross per 24 hour   Intake 2368.37 ml   Output 7110 ml   Net -4741.63 ml       Lab Data:  CBC:   Lab Results

## 2021-04-29 NOTE — CONSULTS
PICC line education:    -Risks  -Benefits  -Alternatives  -Procedure    Discussed the above with patient, verbalized understanding, answered all questions. Provided with information on PICC care to review. PICC tip verified via 3[employer] (Ok to use).  Reported off to patient's  Nurse Go.

## 2021-04-29 NOTE — PLAN OF CARE
Problem: Pain:  Goal: Control of acute pain  Description: Control of acute pain  Outcome: Ongoing     Problem: Cardiovascular  Goal: No DVT, peripheral vascular complications  Outcome: Ongoing  Goal: Hemodynamic stability  Outcome: Ongoing  Goal: Anticoagulate/Hct stable  Outcome: Ongoing  Goal: Agreement to quit smoking  Outcome: Ongoing  Goal: Weight maintained or lost  Outcome: Ongoing  Goal: Understanding of dietary restrictions  Outcome: Ongoing     Problem: Respiratory  Goal: No pulmonary complications  Outcome: Ongoing  Goal: O2 Sat > 90%  Outcome: Ongoing  Goal: Supplemental O2 requirements decreased  Outcome: Ongoing  Goal: Agreement to quit smoking  Outcome: Ongoing  Goal: Pneumonia vaccine at discharge as needed  Outcome: Ongoing     Problem: Musculor/Skeletal Functional Status  Goal: Highest potential functional level  Outcome: Ongoing  Goal: Absence of falls  Outcome: Ongoing     Problem: Serum Glucose Level - Abnormal:  Goal: Ability to maintain appropriate glucose levels has stabilized  Description: Ability to maintain appropriate glucose levels has stabilized  Outcome: Ongoing   Pt pain controlled with percocet and rest. Pt has had increased output and is compliant with diet and fluid restriction. Pt vitals are stable on room air. Pt has been absent of falls and physical injury.

## 2021-04-29 NOTE — PROGRESS NOTES
release tablet 25 mg, Daily  albuterol (PROVENTIL) nebulizer solution 2.5 mg, Q6H PRN  aspirin chewable tablet 81 mg, Daily  gabapentin (NEURONTIN) capsule 300 mg, Nightly  montelukast (SINGULAIR) tablet 10 mg, Nightly  pantoprazole (PROTONIX) tablet 40 mg, QAM AC  sodium chloride flush 0.9 % injection 5-40 mL, 2 times per day  0.9 % sodium chloride infusion, PRN  promethazine (PHENERGAN) tablet 12.5 mg, Q6H PRN    Or  ondansetron (ZOFRAN) injection 4 mg, Q6H PRN  polyethylene glycol (GLYCOLAX) packet 17 g, Daily PRN  acetaminophen (TYLENOL) tablet 650 mg, Q6H PRN    Or  acetaminophen (TYLENOL) suppository 650 mg, Q6H PRN  insulin lispro (1 Unit Dial) 0-12 Units, TID WC  insulin lispro (1 Unit Dial) 0-6 Units, Nightly  glucose (GLUTOSE) 40 % oral gel 15 g, PRN  dextrose 50 % IV solution, PRN  glucagon (rDNA) injection 1 mg, PRN  dextrose 5 % solution, PRN        Objective:  /67   Pulse 90   Temp 97.9 °F (36.6 °C) (Oral)   Resp 16   Ht 5' 8\" (1.727 m)   Wt 196 lb 11.2 oz (89.2 kg)   SpO2 97%   BMI 29.91 kg/m²     Intake/Output Summary (Last 24 hours) at 4/29/2021 1434  Last data filed at 4/29/2021 1348  Gross per 24 hour   Intake 1989.37 ml   Output 6755 ml   Net -4765.63 ml      Wt Readings from Last 3 Encounters:   04/29/21 196 lb 11.2 oz (89.2 kg)   04/13/21 205 lb (93 kg)   04/02/21 206 lb 9.6 oz (93.7 kg)   3+ BL LE edema  Obese  General appearance:  Appears comfortable  Eyes: Sclera clear. Pupils equal.  ENT: Moist oral mucosa. Trachea midline, no adenopathy. Cardiovascular: Regular rhythm, normal S1, S2. No murmur. Respiratory: Not using accessory muscles. Good inspiratory effort. Clear to auscultation bilaterally, no wheeze or crackles. GI: Abdomen soft, no tenderness, not distended, normal bowel sounds  Musculoskeletal: No cyanosis in digits, neck supple  Neurology: CN 2-12 grossly intact. No speech or motor deficits  Psych: Normal affect.  Alert and oriented in time, place and person  Skin: Warm, dry, normal turgor  Extremity exam shows brisk capillary refill. Peripheral pulses are palpable in lower extremities     Labs and Tests:  CBC:   Recent Labs     04/27/21  0420 04/28/21  0430 04/29/21  0418   WBC 4.7 4.4 4.3   HGB 9.7* 9.5* 10.3*    227 240     BMP:    Recent Labs     04/27/21  0420 04/28/21  0429 04/29/21  0418    137 135*   K 3.2* 3.3* 2.8*   CL 99 101 94*   CO2 26 26 31   BUN 26* 26* 23*   CREATININE 1.3* 1.3* 1.3*   GLUCOSE 95 121* 140*     Hepatic: No results for input(s): AST, ALT, ALB, BILITOT, ALKPHOS in the last 72 hours. XR CHEST PORTABLE   Final Result   No acute cardiopulmonary disease. Stable cardiomegaly with no evidence of   overt failure.                Abel Rivera MD   4/29/2021 2:34 PM

## 2021-04-29 NOTE — PROGRESS NOTES
Nephrology Attending  Progress Note        SUMMARY :  We are following this patient for TORIBIO on CKD stage 3   77 y.o. female who yesterday night started to have midsternal chest pain that was 7 out of 10 pressure-like associated with palpitations heart rate was up to 140s. Was short of breath no diaphoresis some nausea no vomiting. Patient came to the ED was in A. fib with RVR. SUBJECTIVE:   Patient progress reviewed.    Dyspnea + , No dysuria   in the room   AICD placement postponed for now   Plan for JUDE 5/3 to assess severity of Aortic stenosis     Physical Exam:    VITALS:  /70   Pulse 89   Temp 98.2 °F (36.8 °C) (Oral)   Resp 16   Ht 5' 8\" (1.727 m)   Wt 196 lb 11.2 oz (89.2 kg)   SpO2 91%   BMI 29.91 kg/m²   BLOOD PRESSURE RANGE:  Systolic (27NGK), KMQ:437 , Min:111 , VXJ:625   ; Diastolic (93AXC), TATYANA:86, Min:64, Max:77    24HR INTAKE/OUTPUT:      Intake/Output Summary (Last 24 hours) at 4/29/2021 1142  Last data filed at 4/29/2021 1131  Gross per 24 hour   Intake 2225.23 ml   Output 6635 ml   Net -4409.77 ml   O > I       Gen:  alert, oriented x 3  PERRL , EOM +  Pallor +, No icterus  JVP not raised   CV: IRRR , Gr 4/6 systolic murmur heard all over precordium   Lungs:B/ L air entry, Normal breath sounds ,  Scattered crackles at bases   Abd: soft, bowel sounds + , No organomegaly   Ext: 1-2+ edema, no cyanosis  No  Rash   Neuro: nonfocal.      DATA:    CBC with Differential:    Lab Results   Component Value Date    WBC 4.3 04/29/2021    RBC 3.87 04/29/2021    HGB 10.3 04/29/2021    HCT 32.3 04/29/2021     04/29/2021    MCV 83.4 04/29/2021    MCH 26.6 04/29/2021    MCHC 31.8 04/29/2021    RDW 17.3 04/29/2021    SEGSPCT 64.1 09/02/2020    BANDSPCT 1 01/14/2019    LYMPHOPCT 13.0 04/29/2021    MONOPCT 16.5 04/29/2021    BASOPCT 0.4 04/29/2021    MONOSABS 0.7 04/29/2021    LYMPHSABS 0.6 04/29/2021    EOSABS 0.2 04/29/2021    BASOSABS 0.0 04/29/2021     CMP:    Lab Results Component Value Date     04/29/2021    K 2.8 04/29/2021    K 3.5 04/21/2021    CL 94 04/29/2021    CO2 31 04/29/2021    BUN 23 04/29/2021    CREATININE 1.3 04/29/2021    GFRAA 48 04/29/2021    AGRATIO 0.8 04/15/2021    LABGLOM 40 04/29/2021    LABGLOM 45 12/06/2018    GLUCOSE 140 04/29/2021    PROT 6.6 04/15/2021    LABALBU 3.0 04/29/2021    CALCIUM 8.9 04/29/2021    BILITOT 0.6 04/15/2021    ALKPHOS 215 04/15/2021    AST 15 04/15/2021    ALT 10 04/15/2021     Magnesium:    Lab Results   Component Value Date    MG 1.80 04/29/2021     Phosphorus:    Lab Results   Component Value Date    PHOS 3.3 04/29/2021     Troponin:    Lab Results   Component Value Date    TROPONINI 0.02 04/15/2021     U/A:    Lab Results   Component Value Date    COLORU Yellow 04/15/2021    PROTEINU Negative 04/15/2021    PHUR 6.5 04/15/2021    WBCUA 3 04/15/2021    RBCUA 2 04/15/2021    YEAST neg 02/12/2021    BACTERIA trace 02/12/2021    BACTERIA 1+ 05/11/2020    CLARITYU Clear 04/15/2021    SPECGRAV 1.020 04/15/2021    LEUKOCYTESUR Negative 04/15/2021    UROBILINOGEN 0.2 04/15/2021    BILIRUBINUR Negative 04/15/2021    BLOODU TRACE-INTACT 04/15/2021    GLUCOSEU Negative 04/15/2021         IMPRESSION/RECOMMENDATIONS:      Diagnosis and Plan     1. TORIBIO   due to renal hypoperfusion secondary to hypotension as well as atrial fibrillation and diuretic use  Cr 1.3, stable    2. Hypokalemia   Being Replaced po and IV     3. CKD stage 3   Presumptive  diabetic nephropathy, however no proteinuria    Baseline  creatinine 1.1    4. HTN     5. Afib with RVR  Now controlled     6.  sCHF    acute on chronic exacerbation, recent EF 10 to 15%  Goal is to diurese her so that  she can lie flat to undergo a JUDE           Erma Pfeiffer

## 2021-04-29 NOTE — PROGRESS NOTES
[address]   Electrophysiology Progress Note   Date: 4/29/2021  Admit Date: 4/15/2021     Reason for follow up: Atrial fibrillation RVR    Chief Complaint:   Chief Complaint   Patient presents with    Chest Pain    Back Pain     History of Present Illness: History obtained from patient and medical record. Mary Trujillo is a 77 y.o. female with a past medical history of HTN, HLD, DM, CAD s/p CABG x 3, S/p bioprosthetic MVR, WAYNE clip (8/2018), afib s/p Maze 2018, CMP (EF25-30%) DVT/PE on warfarin and sCHF follows with HF. S/p ablation of afib w/ PVI, roofline, posterior, CTI flutter 10/30/2019. S/p ILR. Hx of Mobitz I AVB and prolonged AR interval. S/p JUDE/CV. Has been treated with amiodarone stopped in 2018. Presented with palpitations and tachycardia, found to be in afib/RVR and started on diltiazem gtt. Reportedly her carvedilol was reduced recently. Interval Hx: Today, she is afib/CVR with frequent PVCs today. Hypokalemic with a, K 2.8, mag 1.8. No new complaints today. No major events overnight. Denies having chest pain, palpitations, shortness of breath, orthopnea/PND, cough, or dizziness. Patient seen and examined. Clinical notes reviewed. Telemetry reviewed.      Problem List:   Patient Active Problem List    Diagnosis Date Noted    Acute on chronic systolic CHF (congestive heart failure) ([address] 04/26/2021    Hypoglycemia 04/26/2021    Atrial fibrillation with rapid ventricular response (HCC)     Chest pain     Dyspnea on exertion     Acute kidney injury superimposed on CKD ([address]     Hypotension     Acute on chronic systolic heart failure due to valvular disease ([address] 02/26/2021    Stage 3 chronic kidney disease     Coronary artery disease involving native heart without angina pectoris     Deep vein thrombosis (DVT) of non-extremity vein 12/15/2020    Aortic valve stenosis 11/03/2020    Pneumatosis intestinalis of small intestine 05/10/2020    Ischemic necessary   CAD   - Stable   - No reports of angina   - Continue medical therapy  HTN   - Controlled. No medication changes, BP is soft  PVCs   - likely secondary to hypokalemia   - IV replacement of K+ recommended    - Keep mag >2 and K >4     - Continue amiodarone 200 mg daily   - ICD can be considered as OP when HF improves to Class III B or ambulatory class IV, at this time she does not meet appropriate guidelines for insertion of AICD for primary prevention  - OK to resume AC per primary team for DVT/PE  - Will arrange for OP follow up with Dr. Tremaine Ramirez     All pertinent information and plan of care discussed with the EP physician. Multiple medical conditions with risk of decompensation. Allergies: Allergies   Allergen Reactions    Sheylrij-Eierqni-Stkaag [Fluocinolone] Shortness Of Breath    Ciprofloxacin Shortness Of Breath    Diovan [Valsartan] Shortness Of Breath    Flagyl [Metronidazole] Shortness Of Breath     Has taken diflucan at home 12/7/15    Metformin And Related [Metformin And Related] Shortness Of Breath    Benazepril      Other reaction(s): Not Recorded    Morphine      Bad reaction. \"makes her feel horrible\".  Saxagliptin      Other reaction(s): Not Recorded    Levaquin [Levofloxacin] Rash     Home Meds:  Prior to Visit Medications    Medication Sig Taking? Authorizing Provider   spironolactone (ALDACTONE) 25 MG tablet Take 2 tablets by mouth daily Yes Violeta Fermin Side, APRN - CNS   torsemide (DEMADEX) 20 MG tablet 40mg morning, 40mg afternoon, 20mg evening Yes Jayshree Howell MD   carvedilol (COREG) 6.25 MG tablet Take 1 tablet by mouth daily Yes Jayshree Howell MD   gabapentin (NEURONTIN) 300 MG capsule Take 1 capsule by mouth nightly for 90 days.  Intended supply: 30 days Yes Vasu Falcon MD   insulin glargine (LANTUS SOLOSTAR) 100 UNIT/ML injection pen INJECT 26 UNITS IN THE MORNING AND 18 UNITS AT BEDTIME Yes Indio Vo MD   exenatide (BYETTA 10 MCG PEN) 10 blood clots     Hypertension     Irregular heart beat     Other specified gastritis without mention of hemorrhage     Palpitations     Skin cancer     basal and squamous    Type II or unspecified type diabetes mellitus without mention of complication, not stated as uncontrolled       Past Surgical History:    has a past surgical history that includes Cholecystectomy;  section; Colonoscopy (2017); skin biopsy; bronchoscopy (2016); Coronary artery bypass graft (2018); Mitral valve replacement (2018); transesophageal echocardiogram (2018); Tunneled venous catheter placement (Left, 2018); Cardiac catheterization (2018); Insertable Cardiac Monitor (Left, 2018); Colonoscopy (2014); Hysterectomy; Upper gastrointestinal endoscopy (N/A, 10/10/2018); Colonoscopy (10/10/2018); Colonoscopy (N/A, 2020); Pain management procedure (N/A, 2020); and Pain management procedure (Bilateral, 2021). Social History:  Reviewed. reports that she has never smoked. She has never used smokeless tobacco. She reports that she does not drink alcohol or use drugs. Family History:  Reviewed. family history includes Asthma in her mother; Cancer in her father; Heart Disease in her mother; High Blood Pressure in her mother; Hypertension in her mother. Denies family history of sudden cardiac death, arrhythmia, premature CAD    Review of Systems:  · Constitutional: Negative for fever + generalized weakness  · Skin: Negative for bruising, bleeding, + BLE redness  · HEENT: Negative for vision changes  · Respiratory: Reviewed in HPI  · Cardiovascular: Reviewed in HPI  · Gastrointestinal: Negative for abdominal pain, N/V/D, constipation, or black/tarry stools  · Genito-Urinary: Negative for hematuria  · Musculoskeletal: No focal weakness  · Neurological/Psych: Negative for confusion or TIA-like symptoms.  No anxiety, depression, or insomnia    Physical Examination:  Vitals:    04/29/21 0834   BP: 119/77   Pulse: 94   Resp: 16   Temp: 98 °F (36.7 °C)   SpO2: 91%      In: 1106.5 [P.O.:780; I.V.:326.5]  Out: 4450    Wt Readings from Last 3 Encounters:   04/29/21 196 lb 11.2 oz (89.2 kg)   04/13/21 205 lb (93 kg)   04/02/21 206 lb 9.6 oz (93.7 kg)       Intake/Output Summary (Last 24 hours) at 4/29/2021 0934  Last data filed at 4/29/2021 7886  Gross per 24 hour   Intake 1667.41 ml   Output 5405 ml   Net -3737.59 ml     Telemetry: Personally Reviewed Atrial fibrillation w/ RVR  · Constitutional: Cooperative and in no apparent distress, and appears well nourished  · Skin: Warm and pink; no cyanosis, bruising, or clubbing  · HEENT: Symmetric and normocephalic. Conjunctiva pink with clear sclera. Mucus membranes pink and moist.   · Cardiovascular: irregular and rhythm. S1 & S2. Peripheral pulses 2+, capillary refill < 3 seconds. negative elevation of JVP. + BLE edema 1-2+, legs wrapped, systolic murmur  · Respiratory: Respirations symmetric and unlabored. Lungs to auscultation bilaterally, no wheezing, or rhonchi + fine crackles BLL  · Gastrointestinal: Abdomen soft and round. Bowel sounds normoactive in all quadrants. · Musculoskeletal: No focal weakness. · Neurologic/Psych: Awake and orientated to person, place and time. Calm affect, appropriate mood    Pertinent labs, diagnostic, device, and imaging results reviewed as a part of this visit    Labs:    BMP:   Recent Labs     04/27/21  0420 04/28/21  0429 04/29/21  0418    137 135*   K 3.2* 3.3* 2.8*   CL 99 101 94*   CO2 26 26 31   PHOS 3.6 3.7 3.3   BUN 26* 26* 23*   CREATININE 1.3* 1.3* 1.3*   MG 1.80 1.70* 1.80     Estimated Creatinine Clearance: 44 mL/min (A) (based on SCr of 1.3 mg/dL (H)).    CBC:   Recent Labs     04/27/21  0420 04/28/21  0430 04/29/21  0418   WBC 4.7 4.4 4.3   HGB 9.7* 9.5* 10.3*   HCT 30.9* 29.6* 32.3*   MCV 84.9 84.8 83.4    227 240     Thyroid:   Lab Results   Component Value Date    TSH 1.90 2021     Lipids:   Lab Results   Component Value Date    CHOL 149 2020    HDL 40 2020    TRIG 94 2021     LFTS:   Lab Results   Component Value Date    ALT 10 04/15/2021    AST 15 04/15/2021    ALKPHOS 215 04/15/2021    PROT 6.6 04/15/2021    AGRATIO 0.8 04/15/2021    BILITOT 0.6 04/15/2021     Cardiac Enzymes:   Lab Results   Component Value Date    TROPONINI 0.02 04/15/2021    TROPONINI 0.02 04/15/2021    TROPONINI <0.01 05/10/2020     Coags:   Lab Results   Component Value Date    PROTIME 25.5 2021    INR 2.18 2021     EC/15/2021: afib/RVR    ECHO:  2021  Summary   Overall left ventricular systolic function is severely depressed . Ejection fraction is visually estimated to be 10-15 %. E/e'= 23.2 GLS=-3.4   Moderately dilated left ventricle. There is severe diffuse hypokinesis. Indeterminate diastolic function. A bioprosthetic mitral valve is well seated with peak velocity of 1.59m/s   and a mean gradient of 3mmHg. The left atrium is moderately dilated. Mild aortic stenosis with a peak velocity of 2.5m/s and a mean pressure gradient of 14mmHg. The aortic valve is thickened/calcified with decreased leaflet mobility   consistent with aortic stenosis. Mild to moderate tricuspid regurgitation. Estimated pulmonary artery systolic pressure is mildly to moderately elevated at 46 mmHg assuming a right atrial pressure of 8 mmHg. 2020  Summary   Dobutamine echocardiogram for aortic stenosis. Very technically limited exam due to atrial fibrillation. Baseline echocardiogram shows severe left ventricular dysfunction with   anterior, lateral and apical hypokinesis with an ejection fraction of 20-25   %. There is no improvement in wall motion with low or high dose dobutamine   suggestive of nonviable myocardium.    Mild prosthetic aortic valve stenosis with a mean gradient of 16mmHg and   peak velocity of 2.47m/s at rest. After dobutamine stress the mean AV   gradient rises to 20mmHg with 20mcg of dobutamine consistent with mild to   moderate aortic stenosis. Prosthetic mitral valve with a mean gradient of 7mmHg  9/15/2020  Summary   Left ventricular cavity size is dilated. There is normal wall thickness with a moderately severe reduction in   systolic function. LV ejection fraction is visually estimated to be 25-30%. Indeterminate diastolic function. The right ventricle is not well visualized but appears to be normal in size   with moderately reduced function. The left atrium is moderately dilated. A bioprosthetic mitral valve with a mean gradient of 7 mmHg. This may be a   normal gradient for this valve but could suggest mild stenosis. Trivial mitral regurgitation. The aortic valve is thickened/calcified with a mean gradient of 16 mmHg   consistent with at least mild aortic stenosis. This is likely underestimated   due to low cardiac output secondary to LV dysfunction. No significant aortic valve regurgitation. Moderate tricuspid regurgitation with RVSP of 48 mmHg. Stress Test: 8/7/2018  Summary    Small sized lateral completely reversible defect of mild intensity    consistent with ischemia in the territory of the LCx and/or LAD .    Normal LV function.    Overall findings represent a intermediate risk scan.         All questions and concerns were addressed to the patient. Alternatives to my treatment were discussed. I have discussed the above stated plan with patient and the nurse. The patient verbalized understanding and agreed with the plan. Thank you for allowing to us to participate in the care of Rainer Delgado. SOLEDAD Higgins Cera-JOSE L  [facility]   Office: [phone]    I have spent 35 minutes of face to face time with the patient with more than 50% spent counseling and coordinating care for Rainer Delgado.

## 2021-04-30 LAB
ALBUMIN SERPL-MCNC: 3 G/DL (ref 3.4–5)
ANION GAP SERPL CALCULATED.3IONS-SCNC: 8 MMOL/L (ref 3–16)
BASOPHILS ABSOLUTE: 0 K/UL (ref 0–0.2)
BASOPHILS RELATIVE PERCENT: 0.7 %
BUN BLDV-MCNC: 22 MG/DL (ref 7–20)
CALCIUM SERPL-MCNC: 8.9 MG/DL (ref 8.3–10.6)
CHLORIDE BLD-SCNC: 94 MMOL/L (ref 99–110)
CO2: 34 MMOL/L (ref 21–32)
CREAT SERPL-MCNC: 1.5 MG/DL (ref 0.6–1.2)
EOSINOPHILS ABSOLUTE: 0.4 K/UL (ref 0–0.6)
EOSINOPHILS RELATIVE PERCENT: 7.3 %
GFR AFRICAN AMERICAN: 41
GFR NON-AFRICAN AMERICAN: 34
GLUCOSE BLD-MCNC: 122 MG/DL (ref 70–99)
GLUCOSE BLD-MCNC: 135 MG/DL (ref 70–99)
GLUCOSE BLD-MCNC: 168 MG/DL (ref 70–99)
GLUCOSE BLD-MCNC: 174 MG/DL (ref 70–99)
GLUCOSE BLD-MCNC: 223 MG/DL (ref 70–99)
HCT VFR BLD CALC: 32.3 % (ref 36–48)
HEMOGLOBIN: 10.3 G/DL (ref 12–16)
INR BLD: 1.97 (ref 0.86–1.14)
LYMPHOCYTES ABSOLUTE: 0.6 K/UL (ref 1–5.1)
LYMPHOCYTES RELATIVE PERCENT: 11.1 %
MAGNESIUM: 1.7 MG/DL (ref 1.8–2.4)
MCH RBC QN AUTO: 26.5 PG (ref 26–34)
MCHC RBC AUTO-ENTMCNC: 31.8 G/DL (ref 31–36)
MCV RBC AUTO: 83.3 FL (ref 80–100)
MONOCYTES ABSOLUTE: 0.7 K/UL (ref 0–1.3)
MONOCYTES RELATIVE PERCENT: 14.2 %
NEUTROPHILS ABSOLUTE: 3.5 K/UL (ref 1.7–7.7)
NEUTROPHILS RELATIVE PERCENT: 66.7 %
PDW BLD-RTO: 17.2 % (ref 12.4–15.4)
PERFORMED ON: ABNORMAL
PHOSPHORUS: 2.9 MG/DL (ref 2.5–4.9)
PLATELET # BLD: 225 K/UL (ref 135–450)
PMV BLD AUTO: 7.8 FL (ref 5–10.5)
POTASSIUM SERPL-SCNC: 3.3 MMOL/L (ref 3.5–5.1)
POTASSIUM SERPL-SCNC: 3.4 MMOL/L (ref 3.5–5.1)
POTASSIUM SERPL-SCNC: 4.1 MMOL/L (ref 3.5–5.1)
PROTHROMBIN TIME: 23 SEC (ref 10–13.2)
RBC # BLD: 3.88 M/UL (ref 4–5.2)
SODIUM BLD-SCNC: 136 MMOL/L (ref 136–145)
WBC # BLD: 5.2 K/UL (ref 4–11)

## 2021-04-30 PROCEDURE — 85610 PROTHROMBIN TIME: CPT

## 2021-04-30 PROCEDURE — 6370000000 HC RX 637 (ALT 250 FOR IP): Performed by: NURSE PRACTITIONER

## 2021-04-30 PROCEDURE — 80069 RENAL FUNCTION PANEL: CPT

## 2021-04-30 PROCEDURE — 83735 ASSAY OF MAGNESIUM: CPT

## 2021-04-30 PROCEDURE — 6360000002 HC RX W HCPCS: Performed by: INTERNAL MEDICINE

## 2021-04-30 PROCEDURE — 2580000003 HC RX 258: Performed by: INTERNAL MEDICINE

## 2021-04-30 PROCEDURE — 84132 ASSAY OF SERUM POTASSIUM: CPT

## 2021-04-30 PROCEDURE — 99233 SBSQ HOSP IP/OBS HIGH 50: CPT | Performed by: INTERNAL MEDICINE

## 2021-04-30 PROCEDURE — 2580000003 HC RX 258: Performed by: NURSE PRACTITIONER

## 2021-04-30 PROCEDURE — 6370000000 HC RX 637 (ALT 250 FOR IP): Performed by: INTERNAL MEDICINE

## 2021-04-30 PROCEDURE — 6360000002 HC RX W HCPCS: Performed by: NURSE PRACTITIONER

## 2021-04-30 PROCEDURE — 85025 COMPLETE CBC W/AUTO DIFF WBC: CPT

## 2021-04-30 PROCEDURE — 2580000003 HC RX 258

## 2021-04-30 PROCEDURE — 2060000000 HC ICU INTERMEDIATE R&B

## 2021-04-30 RX ORDER — SODIUM CHLORIDE 9 MG/ML
INJECTION, SOLUTION INTRAVENOUS
Status: COMPLETED
Start: 2021-04-30 | End: 2021-04-30

## 2021-04-30 RX ORDER — MAGNESIUM SULFATE IN WATER 40 MG/ML
2000 INJECTION, SOLUTION INTRAVENOUS ONCE
Status: COMPLETED | OUTPATIENT
Start: 2021-04-30 | End: 2021-04-30

## 2021-04-30 RX ADMIN — FUROSEMIDE 15 MG/HR: 10 INJECTION, SOLUTION INTRAMUSCULAR; INTRAVENOUS at 03:21

## 2021-04-30 RX ADMIN — Medication 10 ML: at 20:41

## 2021-04-30 RX ADMIN — SODIUM CHLORIDE 25 ML: 9 INJECTION, SOLUTION INTRAVENOUS at 10:15

## 2021-04-30 RX ADMIN — MONTELUKAST SODIUM 10 MG: 10 TABLET, FILM COATED ORAL at 20:40

## 2021-04-30 RX ADMIN — POTASSIUM CHLORIDE 40 MEQ: 1500 TABLET, EXTENDED RELEASE ORAL at 17:01

## 2021-04-30 RX ADMIN — INSULIN GLARGINE 10 UNITS: 100 INJECTION, SOLUTION SUBCUTANEOUS at 20:41

## 2021-04-30 RX ADMIN — POTASSIUM CHLORIDE 20 MEQ: 29.8 INJECTION, SOLUTION INTRAVENOUS at 06:18

## 2021-04-30 RX ADMIN — MAGNESIUM SULFATE HEPTAHYDRATE 2000 MG: 40 INJECTION, SOLUTION INTRAVENOUS at 10:22

## 2021-04-30 RX ADMIN — OXYCODONE AND ACETAMINOPHEN 1 TABLET: 325; 10 TABLET ORAL at 12:25

## 2021-04-30 RX ADMIN — INSULIN LISPRO 4 UNITS: 100 INJECTION, SOLUTION INTRAVENOUS; SUBCUTANEOUS at 17:01

## 2021-04-30 RX ADMIN — Medication 10 ML: at 08:01

## 2021-04-30 RX ADMIN — NAPHAZOLINE HYDROCHLORIDE AND PHENIRAMINE MALEATE 1 DROP: .25; 3 SOLUTION/ DROPS OPHTHALMIC at 20:40

## 2021-04-30 RX ADMIN — METOPROLOL SUCCINATE 25 MG: 25 TABLET, EXTENDED RELEASE ORAL at 08:00

## 2021-04-30 RX ADMIN — POTASSIUM CHLORIDE 20 MEQ: 29.8 INJECTION, SOLUTION INTRAVENOUS at 05:03

## 2021-04-30 RX ADMIN — FUROSEMIDE 15 MG/HR: 10 INJECTION, SOLUTION INTRAMUSCULAR; INTRAVENOUS at 12:25

## 2021-04-30 RX ADMIN — POTASSIUM CHLORIDE 40 MEQ: 1500 TABLET, EXTENDED RELEASE ORAL at 08:00

## 2021-04-30 RX ADMIN — PANTOPRAZOLE SODIUM 40 MG: 40 TABLET, DELAYED RELEASE ORAL at 05:05

## 2021-04-30 RX ADMIN — INSULIN LISPRO 2 UNITS: 100 INJECTION, SOLUTION INTRAVENOUS; SUBCUTANEOUS at 11:36

## 2021-04-30 RX ADMIN — POLYETHYLENE GLYCOL 3350 17 G: 17 POWDER, FOR SOLUTION ORAL at 20:40

## 2021-04-30 RX ADMIN — FUROSEMIDE 15 MG/HR: 10 INJECTION, SOLUTION INTRAMUSCULAR; INTRAVENOUS at 19:58

## 2021-04-30 RX ADMIN — AMIODARONE HYDROCHLORIDE 200 MG: 200 TABLET ORAL at 08:00

## 2021-04-30 RX ADMIN — POLYETHYLENE GLYCOL 3350 17 G: 17 POWDER, FOR SOLUTION ORAL at 08:00

## 2021-04-30 RX ADMIN — METOLAZONE 5 MG: 2.5 TABLET ORAL at 08:00

## 2021-04-30 RX ADMIN — OXYCODONE 5 MG: 5 TABLET ORAL at 19:21

## 2021-04-30 RX ADMIN — ASPIRIN 81 MG: 81 TABLET, CHEWABLE ORAL at 08:00

## 2021-04-30 RX ADMIN — POTASSIUM CHLORIDE 20 MEQ: 29.8 INJECTION, SOLUTION INTRAVENOUS at 12:01

## 2021-04-30 RX ADMIN — MILRINONE LACTATE 0.2 MCG/KG/MIN: 200 INJECTION, SOLUTION INTRAVENOUS at 10:20

## 2021-04-30 RX ADMIN — ONDANSETRON 4 MG: 2 INJECTION INTRAMUSCULAR; INTRAVENOUS at 20:40

## 2021-04-30 RX ADMIN — SPIRONOLACTONE 12.5 MG: 25 TABLET ORAL at 08:00

## 2021-04-30 RX ADMIN — NAPHAZOLINE HYDROCHLORIDE AND PHENIRAMINE MALEATE 1 DROP: .25; 3 SOLUTION/ DROPS OPHTHALMIC at 08:01

## 2021-04-30 RX ADMIN — POTASSIUM CHLORIDE 20 MEQ: 29.8 INJECTION, SOLUTION INTRAVENOUS at 10:30

## 2021-04-30 ASSESSMENT — PAIN DESCRIPTION - ORIENTATION
ORIENTATION: LOWER

## 2021-04-30 ASSESSMENT — PAIN DESCRIPTION - LOCATION: LOCATION: BACK

## 2021-04-30 ASSESSMENT — PAIN SCALES - GENERAL: PAINLEVEL_OUTOF10: 3

## 2021-04-30 ASSESSMENT — PAIN DESCRIPTION - FREQUENCY: FREQUENCY: CONTINUOUS

## 2021-04-30 NOTE — ACP (ADVANCE CARE PLANNING)
Advanced Care Planning Note. Purpose of Encounter: Advanced care planning in light of acute on chronic systolic CHF  Parties In Attendance: Patient,   Decisional Capacity: Yes  Subjective: Patient with BL LE edema and SOB  Objective: Cr 1.5  Goals of Care Determination: Patient wants full support (CPR, vent, surgery, HD, trach, PEG)  Plan:  Lasix and Milrinone gtt. Cardio and Renal consults. Zaroxolyn. ? JUDE or LHC  Code Status: Full code   Time spent on Advanced care Plannin minutes  Advanced Care Planning Documents: Completed advanced directives on chart,  is the POA.     Radha Quiles MD  2021 12:32 PM Problem: Goal Outcome Summary  Goal: Goal Outcome Summary  Outcome: Improving  Infant vitals ar stable. Baby  well per mother & has adequate output. Hep B vaccine given. Will continue to monitor baby status.

## 2021-04-30 NOTE — PROGRESS NOTES
HOSPITALISTS PROGRESS NOTE    4/30/2021 12:05 PM        Name: Shun Padron Admitted: 4/15/2021  Primary Care Provider: Perri Avendano MD (Tel: [phone])      Problem List  Principal Problem:    Acute on chronic systolic CHF (congestive heart failure) ([facility]  Active Problems:    Type 2 diabetes mellitus with hyperglycemia, with long-term current use of insulin (HCC)    PAF (paroxysmal atrial fibrillation) (HCC)    S/P CABG x 3    Obesity, Class I, BMI 30-34.9    Chronic systolic CHF (congestive heart failure), NYHA class 3 (HCC)    Persistent atrial fibrillation (HCC)    S/P MVR (mitral valve repair)    Stage 3 chronic kidney disease    Coronary artery disease involving native heart without angina pectoris    Atrial fibrillation with rapid ventricular response (HCC)    Chest pain    Dyspnea on exertion    Acute kidney injury superimposed on CKD ([facility]    Hypoglycemia  Resolved Problems:    * No resolved hospital problems. *       Assessment & Plan:   Patient remains fluid overloaded on Milrinone and Lasix gtts  May benefit from JUDE or LHC early next week    Acute on chronic systolic heart failure  Lasix and milrinone drip, nephrology and cardiology consulted, Lasix drip rate remains 15 mg/hr   Zaroxolyn continued  Much improved diuresis with Zaroxolyn    Persistent atrial fibrillation  History of left atrial appendage clipping, failed cardioversion, holding Coumadin for ICD and continue amiodarone    TORIBIO on CKD  Likely CRS  Nephrology following  Stable renal function, adjustment of diuretic drip as per nephrology and cardiology    Diabetes mellitus uncontrolled with hypoglycemia during the stay  Start Lantus 10 U qhs and titrate up     IV Access: Peripheral  Henry: No  Diet: DIET CARDIAC; Low Sodium (2 GM);  Daily Fluid Restriction: 2000 ml  Code:Full Code  DVT PPX SCD  Disposition Home with home PT/OT/VNS/HHA/SW    Discussed with patient, nursing and CM. D/W  at bedside. She has continues to diurese. Continue diuresis to achieve euvolemia. Once diuresed, possible JUDE or LHC on 5/3 if she can lay flat. Brief Course:    76 yo F with CAD s/p CABG, CKD 3, chronic systolic CHF, Afib, DM 2, obesity [facility] found to have A. fib with RVR along with acute on chronic systolic heart failure with worsening cardiomyopathy. Has been diuresed with Milrinone gtt, Lasix gtt and Zaroxolyn. Cardio and Renal following. No plan for ICD at this time. Cardio considering LHC vs JUDE to evaluate aortic valve once diuresed, hopefully 5/3/21. CC: Fluid overload     Subjective:  . Patient sitting in chair. Ongoing BL LE edema. No CP, HA or abdominal pain. Improving SOB. -5 L with Zaroxolyn, now -11.4 L since admission.       Current Medications  magnesium sulfate 2000 mg in 50 mL IVPB premix, Once  sodium chloride 0.9 % infusion,   lidocaine PF 1 % injection 5 mL, Once  0.9 % sodium chloride infusion, PRN  potassium chloride 20 mEq/50 mL IVPB (Central Line), PRN  warfarin (COUMADIN) daily dosing (placeholder), RX Placeholder  metOLazone (ZAROXOLYN) tablet 5 mg, Daily  amiodarone (CORDARONE) tablet 200 mg, Daily  naphazoline-pheniramine (NAPHCON-A) 0.025-0.3 % ophthalmic solution 1 drop, BID  bisacodyl (DULCOLAX) suppository 10 mg, Daily PRN  polyethylene glycol (GLYCOLAX) packet 17 g, BID  diphenhydrAMINE (BENADRYL) tablet 25 mg, Q6H PRN  oxyCODONE-acetaminophen (PERCOCET)  MG per tablet 1 tablet, Q6H PRN  lidocaine PF 1 % injection 5 mL, Once  sodium chloride flush 0.9 % injection 5-40 mL, PRN  0.9 % sodium chloride infusion, PRN  spironolactone (ALDACTONE) tablet 12.5 mg, Daily  oxyCODONE (ROXICODONE) immediate release tablet 5 mg, Q4H PRN  potassium chloride (KLOR-CON M) extended release tablet 40 mEq, BID WC  furosemide (LASIX) 100 mg in dextrose 5 % 100 mL infusion, Continuous  milrinone (PRIMACOR) 20 mg in dextrose 5 % 100 mL infusion, Continuous  metoprolol succinate (TOPROL XL) extended release tablet 25 mg, Daily  albuterol (PROVENTIL) nebulizer solution 2.5 mg, Q6H PRN  aspirin chewable tablet 81 mg, Daily  gabapentin (NEURONTIN) capsule 300 mg, Nightly  montelukast (SINGULAIR) tablet 10 mg, Nightly  pantoprazole (PROTONIX) tablet 40 mg, QAM AC  sodium chloride flush 0.9 % injection 5-40 mL, 2 times per day  0.9 % sodium chloride infusion, PRN  promethazine (PHENERGAN) tablet 12.5 mg, Q6H PRN    Or  ondansetron (ZOFRAN) injection 4 mg, Q6H PRN  polyethylene glycol (GLYCOLAX) packet 17 g, Daily PRN  acetaminophen (TYLENOL) tablet 650 mg, Q6H PRN    Or  acetaminophen (TYLENOL) suppository 650 mg, Q6H PRN  insulin lispro (1 Unit Dial) 0-12 Units, TID WC  insulin lispro (1 Unit Dial) 0-6 Units, Nightly  glucose (GLUTOSE) 40 % oral gel 15 g, PRN  dextrose 50 % IV solution, PRN  glucagon (rDNA) injection 1 mg, PRN  dextrose 5 % solution, PRN        Objective:  /66   Pulse 91   Temp 98.1 °F (36.7 °C) (Oral)   Resp 14   Ht 5' 8\" (1.727 m)   Wt 192 lb 14.4 oz (87.5 kg)   SpO2 92%   BMI 29.33 kg/m²     Intake/Output Summary (Last 24 hours) at 4/30/2021 1205  Last data filed at 4/30/2021 1203  Gross per 24 hour   Intake 3810.49 ml   Output 4775 ml   Net -964.51 ml      Wt Readings from Last 3 Encounters:   04/30/21 192 lb 14.4 oz (87.5 kg)   04/13/21 205 lb (93 kg)   04/02/21 206 lb 9.6 oz (93.7 kg)   3+ BL LE edema  Obese  General appearance:  Appears comfortable  Eyes: Sclera clear. Pupils equal.  ENT: Moist oral mucosa. Trachea midline, no adenopathy. Cardiovascular: Regular rhythm, normal S1, S2. No murmur. Respiratory: Not using accessory muscles. Good inspiratory effort. Clear to auscultation bilaterally, no wheeze or crackles.    GI: Abdomen soft, no tenderness, not distended, normal bowel sounds  Musculoskeletal: No cyanosis in digits, neck supple  Neurology: CN 2-12 grossly intact. No speech or motor deficits  Psych: Normal affect. Alert and oriented in time, place and person  Skin: Warm, dry, normal turgor  Extremity exam shows brisk capillary refill. Peripheral pulses are palpable in lower extremities     Labs and Tests:  CBC:   Recent Labs     04/28/21  0430 04/29/21 0418 04/30/21  0329   WBC 4.4 4.3 5.2   HGB 9.5* 10.3* 10.3*    240 225     BMP:    Recent Labs     04/28/21  0429 04/29/21 0418 04/29/21 0418 04/29/21  2130 04/30/21  0329 04/30/21  0933    135*  --   --  136  --    K 3.3* 2.8*   < > 3.4* 3.3* 3.4*    94*  --   --  94*  --    CO2 26 31  --   --  34*  --    BUN 26* 23*  --   --  22*  --    CREATININE 1.3* 1.3*  --   --  1.5*  --    GLUCOSE 121* 140*  --   --  168*  --     < > = values in this interval not displayed. Hepatic: No results for input(s): AST, ALT, ALB, BILITOT, ALKPHOS in the last 72 hours. XR CHEST PORTABLE   Final Result   No acute cardiopulmonary disease. Stable cardiomegaly with no evidence of   overt failure.                Elaine Moran MD   4/30/2021 12:05 PM

## 2021-04-30 NOTE — PROGRESS NOTES
[address]  HEART FAILURE  Progress Note      Admit Date 4/15/2021     Reason for Consult:      Reason for Consultation/Chief Complaint: chest pain    HPI:    Ian Schultz is a 77 y.o. female with PMH CAD, CABG/Maze 2018, AF, WAYNE clip 2018, s/p Ablation, ILR, HTN, HLD, DVT, PE, ICM, HFrEF admitted with tachycardia, was in AF with RVR, dilt gtt started and unsuccessful CVx2. Echo with very low EF 10-15% and she was scheduled for ICD but INR was elevated. She has been on lasix gtt and milrinone,  lasix increased      Subjective:  Patient is being seen for CHF, ICM. Her LVEF has dropped to around 10-15%. She continues on IV lasix drip and IV milrinone. She was started on po metolazone the last two days and has diuresed very well. She is still very fluid overloaded. she also has valvular heart disease. There were no acute overnight cardiac events. She has had multiple admissions recently for fluid overload. She is not responding to usual therapies. This was discussed with patient and . I also discussed with Dr. Melania Pagan who has evaluated her in the past for aortic valve pathology. Now that LVEF has dropped, she probably cannot have TAVR. Dr. Melania Pagan will see her over the weekend. Baseline Weight: 198   Wt Readings from Last 3 Encounters:   04/30/21 192 lb 14.4 oz (87.5 kg)   04/13/21 205 lb (93 kg)   04/02/21 206 lb 9.6 oz (93.7 kg)          Objective:   /66   Pulse 91   Temp 98.1 °F (36.7 °C) (Oral)   Resp 14   Ht 5' 8\" (1.727 m)   Wt 192 lb 14.4 oz (87.5 kg)   SpO2 92%   BMI 29.33 kg/m²       Intake/Output Summary (Last 24 hours) at 4/30/2021 1519  Last data filed at 4/30/2021 1228  Gross per 24 hour   Intake 3849.2 ml   Output 4175 ml   Net -325.8 ml      Wt Readings from Last 3 Encounters:   04/30/21 192 lb 14.4 oz (87.5 kg)   04/13/21 205 lb (93 kg)   04/02/21 206 lb 9.6 oz (93.7 kg)      In: 2512.6 [P.O.:1020;  I.V.:1240.2]  Out: 1800       Physical Exam:  General Appearance:  Non-obese/Well Nourished, chronically ill appearing  Respiratory:  · Resp Auscultation: bilateral crackles  Cardiovascular:  · Auscultation: Regular rate and rhythm, normal S1S2, no m/g/r/c  · Palpation: Normal    · Pedal Pulses: 2+ and equal   Abdomen:   distended, firm,   Extremities:  · No Cyanosis or Clubbing  · Extremities: 2+ edema, erythema lle  Neurological/Psychiatric:  · Oriented to time, place, and person  · Non-anxious    MEDICATIONS:   Scheduled Meds:   Scheduled Meds:   insulin glargine  10 Units Subcutaneous Nightly    lidocaine 1 % injection  5 mL Intradermal Once    warfarin (COUMADIN) daily dosing (placeholder)   Other RX Placeholder    metOLazone  5 mg Oral Daily    amiodarone  200 mg Oral Daily    naphazoline-pheniramine  1 drop Both Eyes BID    polyethylene glycol  17 g Oral BID    lidocaine 1 % injection  5 mL Intradermal Once    spironolactone  12.5 mg Oral Daily    potassium chloride  40 mEq Oral BID WC    metoprolol succinate  25 mg Oral Daily    aspirin  81 mg Oral Daily    gabapentin  300 mg Oral Nightly    montelukast  10 mg Oral Nightly    pantoprazole  40 mg Oral QAM AC    sodium chloride flush  5-40 mL Intravenous 2 times per day    insulin lispro  0-12 Units Subcutaneous TID WC    insulin lispro  0-6 Units Subcutaneous Nightly     Continuous Infusions:   sodium chloride 25 mL (04/30/21 1015)    sodium chloride      furosemide (LASIX) 1mg/ml infusion 15 mg/hr (04/30/21 1225)    milrinone 0.2 mcg/kg/min (04/30/21 1020)    sodium chloride      dextrose       PRN Meds:.sodium chloride, potassium chloride, bisacodyl, diphenhydrAMINE, oxyCODONE-acetaminophen, sodium chloride flush, sodium chloride, oxyCODONE, albuterol, sodium chloride, promethazine **OR** ondansetron, polyethylene glycol, acetaminophen **OR** acetaminophen, glucose, dextrose, glucagon (rDNA), dextrose  Continuous Infusions:   sodium chloride 25 mL (04/30/21 1015)    sodium chloride      furosemide (LASIX) 1mg/ml infusion 15 mg/hr (04/30/21 1225)    milrinone 0.2 mcg/kg/min (04/30/21 1020)    sodium chloride      dextrose         Intake/Output Summary (Last 24 hours) at 4/30/2021 1519  Last data filed at 4/30/2021 1228  Gross per 24 hour   Intake 3849.2 ml   Output 4175 ml   Net -325.8 ml       Lab Data:  CBC:   Lab Results   Component Value Date    WBC 5.2 04/30/2021    HGB 10.3 04/30/2021     04/30/2021     BMP:  Lab Results   Component Value Date     04/30/2021    K 4.1 04/30/2021    K 3.5 04/21/2021    CL 94 04/30/2021    CO2 34 04/30/2021    BUN 22 04/30/2021    CREATININE 1.5 04/30/2021    GLUCOSE 168 04/30/2021     INR:   Lab Results   Component Value Date    INR 1.97 04/30/2021    INR 2.18 04/29/2021    INR 2.52 04/28/2021        CARDIAC LABS  ENZYMES:No results for input(s): CKMB, CKMBINDEX, TROPONINI in the last 72 hours. Invalid input(s): CKTOTAL;3  FASTING LIPID PANEL:  Lab Results   Component Value Date    HDL 40 12/23/2020    LDLDIRECT 127 08/21/2018    LDLCALC 97 12/23/2020    TRIG 94 03/03/2021    TSH 1.90 04/01/2021     LIVER PROFILE:  Lab Results   Component Value Date    AST 15 04/15/2021    AST 16 04/15/2021    ALT 10 04/15/2021    ALT 11 04/15/2021     BNP:   Lab Results   Component Value Date    PROBNP 10,037 04/27/2021    PROBNP 6,712 04/22/2021    PROBNP 15,113 04/18/2021     Iron Studies:    Lab Results   Component Value Date    FERRITIN 799.9 10/05/2018    FERRITIN 416.5 08/30/2018     Lab Results   Component Value Date    IRON 49 11/30/2020    TIBC 310 11/30/2020    FERRITIN 799.9 (H) 10/05/2018         1. WEIGHT: Admit Weight: 203 lb 4 oz (92.2 kg)      Today  Weight: 192 lb 14.4 oz (87.5 kg)   2.  I/O     Intake/Output Summary (Last 24 hours) at 4/30/2021 1519  Last data filed at 4/30/2021 1228  Gross per 24 hour   Intake 3849.2 ml   Output 4175 ml   Net -325.8 ml       Cardiac Testing:   Echo 4/20/21  Summary   Overall left this point, she is not a candidate for ICD because she is Class IV. We need to explore end of life decisions. 4. Valvular heart disease. Has evidence of aortic valve stenosis. Dr. Nelida Holliday to discuss further work up over the weekend. continue toprol and richard, no ACE/ARB for TORIBIO  5. AF- on Amio, AC on hold for elevated INR    I have asked for her to be seen by palliative care. We clearly need to discuss end of life situation. She is not a candidate for LVAD due to valvular heart disease.     If lasix drip is stopped over the weekend, would recommend using torsemide 100 mg am.    I appreciate the opportunity of cooperating in the care of this individual.    Carroll Gutierrez MD, [facility]  4/30/2021, 3:19 PM  Heart Failure  The [address], [address]  Ph: [phone]      Core Measures:   · Discharge instructions:   · LVEF documented:   · ACEI for LV dysfunction:   · Smoking Cessation:

## 2021-04-30 NOTE — PROGRESS NOTES
Nephrology Attending  Progress Note        SUMMARY :  We are following this patient for TORIBIO on CKD stage 3   77 y.o. female who yesterday night started to have midsternal chest pain that was 7 out of 10 pressure-like associated with palpitations heart rate was up to 140s. Was short of breath no diaphoresis some nausea no vomiting. Patient came to the ED was in A. fib with RVR. SUBJECTIVE:   Patient progress reviewed.    Dyspnea + , No dysuria   in the room   AICD placement postponed for now   Plan for JUDE 5/3 to assess severity of Aortic stenosis     Physical Exam:    VITALS:  /70   Pulse 94   Temp 98.2 °F (36.8 °C) (Oral)   Resp 17   Ht 5' 8\" (1.727 m)   Wt 196 lb 11.2 oz (89.2 kg)   SpO2 93%   BMI 29.91 kg/m²   BLOOD PRESSURE RANGE:  Systolic (10FZT), WGW:420 , Min:105 , HVZ:151   ; Diastolic (19BUX), NCJ:04, Min:67, Max:71    24HR INTAKE/OUTPUT:      Intake/Output Summary (Last 24 hours) at 4/30/2021 1110  Last data filed at 4/30/2021 1032  Gross per 24 hour   Intake 3847.35 ml   Output 4225 ml   Net -377.65 ml   O > I       Gen:  alert, oriented x 3  PERRL , EOM +  Pallor +, No icterus  JVP not raised   CV: IRRR , Gr 3/6 systolic murmur heard all over precordium , especially at base of heart  Lungs:B/ L air entry, Normal breath sounds ,  No crackles   Abd: soft, bowel sounds + , No organomegaly   Ext: 1-2+ edema, no cyanosis, erythema - shins   No  Rash   Neuro: nonfocal.      DATA:    CBC with Differential:    Lab Results   Component Value Date    WBC 5.2 04/30/2021    RBC 3.88 04/30/2021    HGB 10.3 04/30/2021    HCT 32.3 04/30/2021     04/30/2021    MCV 83.3 04/30/2021    MCH 26.5 04/30/2021    MCHC 31.8 04/30/2021    RDW 17.2 04/30/2021    SEGSPCT 64.1 09/02/2020    BANDSPCT 1 01/14/2019    LYMPHOPCT 11.1 04/30/2021    MONOPCT 14.2 04/30/2021    BASOPCT 0.7 04/30/2021    MONOSABS 0.7 04/30/2021    LYMPHSABS 0.6 04/30/2021    EOSABS 0.4 04/30/2021    BASOSABS 0.0 04/30/2021 CMP:    Lab Results   Component Value Date     04/30/2021    K 3.4 04/30/2021    K 3.5 04/21/2021    CL 94 04/30/2021    CO2 34 04/30/2021    BUN 22 04/30/2021    CREATININE 1.5 04/30/2021    GFRAA 41 04/30/2021    AGRATIO 0.8 04/15/2021    LABGLOM 34 04/30/2021    LABGLOM 45 12/06/2018    GLUCOSE 168 04/30/2021    PROT 6.6 04/15/2021    LABALBU 3.0 04/30/2021    CALCIUM 8.9 04/30/2021    BILITOT 0.6 04/15/2021    ALKPHOS 215 04/15/2021    AST 15 04/15/2021    ALT 10 04/15/2021     Magnesium:    Lab Results   Component Value Date    MG 1.70 04/30/2021     Phosphorus:    Lab Results   Component Value Date    PHOS 2.9 04/30/2021     Troponin:    Lab Results   Component Value Date    TROPONINI 0.02 04/15/2021     U/A:    Lab Results   Component Value Date    COLORU Yellow 04/15/2021    PROTEINU Negative 04/15/2021    PHUR 6.5 04/15/2021    WBCUA 3 04/15/2021    RBCUA 2 04/15/2021    YEAST neg 02/12/2021    BACTERIA trace 02/12/2021    BACTERIA 1+ 05/11/2020    CLARITYU Clear 04/15/2021    SPECGRAV 1.020 04/15/2021    LEUKOCYTESUR Negative 04/15/2021    UROBILINOGEN 0.2 04/15/2021    BILIRUBINUR Negative 04/15/2021    BLOODU TRACE-INTACT 04/15/2021    GLUCOSEU Negative 04/15/2021         IMPRESSION/RECOMMENDATIONS:      Diagnosis and Plan     1. TORIBIO   due to renal hypoperfusion secondary to hypotension as well as atrial fibrillation and diuretic use  Cr 1.5, Bicarb 34     2. Hypokalemia   Being Replaced po and IV , K better at 3.4     3. CKD stage 3   Presumptive  diabetic nephropathy, however no proteinuria    Baseline  creatinine 1.1    4. HTN     5. Afib with RVR  Now controlled     6.  sCHF    acute on chronic exacerbation, recent EF 10 to 15%   Goal is to diurese her so that  she can lie flat to undergo a JUDE           [place]

## 2021-04-30 NOTE — PROGRESS NOTES
Nutrition Assessment     Type and Reason for Visit: Reassess    Nutrition Recommendations/Plan:   1. Continue current diet     Nutrition Assessment:  Pt nutritionally stable at this time. Pt is consuming % of meals. Pt denies any current nausea or vomiting. Pt denies any needs at this time. Malnutrition Assessment:  Malnutrition Status: No malnutrition    Nutrition Related Findings: BLE +2. Facial +1 edema. K 3.4L, Mg 1.70L      Current Nutrition Therapies:    DIET CARDIAC; Low Sodium (2 GM); Daily Fluid Restriction: 2000 ml  Dietary Nutrition Supplements: Diabetic Oral Supplement    Anthropometric Measures:  · Height: 5' 8\" (172.7 cm)  · Current Body Wt: 192 lb (87.1 kg)   · BMI: 29.2    Nutrition Diagnosis:   No nutrition diagnosis at this time    Nutrition Interventions:   Food and/or Nutrient Delivery:  Continue Current Diet  Nutrition Education/Counseling:  Education not indicated   Coordination of Nutrition Care:  Continue to monitor while inpatient    Goals:  PO to remain greater than 75% at meals during los       Nutrition Monitoring and Evaluation:   Behavioral-Environmental Outcomes:  None Identified   Food/Nutrient Intake Outcomes:  Food and Nutrient Intake  Physical Signs/Symptoms Outcomes:  None Identified     Discharge Planning:     Too soon to determine     Electronically signed by Kia Rzd RD, [address] , [place] on 4/30/21 at 2:25 PM EDT    Contact: [phone]

## 2021-04-30 NOTE — PROGRESS NOTES
[facility]  Daily Progress Note    Cardio: zahira  Admit: 4/15/2021      CC: CM, AS, MR, CAD, DVT/PE, AFIB  Subj: Today, feeling better. Long discussion regarding current medical conditions and potential plans of action. Patient and  desire any and all life sustaining or prolonging testing or procedures.       Obj:  /66   Pulse 91   Temp 98.1 °F (36.7 °C) (Oral)   Resp 14   Ht 5' 8\" (1.727 m)   Wt 192 lb 14.4 oz (87.5 kg)   SpO2 92%   BMI 29.33 kg/m²       Intake/Output Summary (Last 24 hours) at 4/30/2021 1535  Last data filed at 4/30/2021 1228  Gross per 24 hour   Intake 3849.2 ml   Output 4175 ml   Net -325.8 ml     Gen Alert, coop, no distress Heart Rrr, 3/6   Head NC, AT, no abnorm Abd Soft, NT, ND, +BS, no mass, no OM   Eyes PERRLA, conj/corn clear Ext Ext nl, AT, no c/c, +edema   Nose Nares nl, no drain, NT Pulse decr bilat   Throat Lips, mucosa, tongue nl Skin Color/tect/turg nl, no rash/lesion   Neck S/S, TM, NT, no bruit/JVD Psych Nl mood and affect   Lung cta bilat Lymph No cervical or ax LA   Ch Wall NT, no deform Neuro Nl gross M/S exam     Medications:    insulin glargine  10 Units Subcutaneous Nightly    lidocaine 1 % injection  5 mL Intradermal Once    warfarin (COUMADIN) daily dosing (placeholder)   Other RX Placeholder    metOLazone  5 mg Oral Daily    amiodarone  200 mg Oral Daily    naphazoline-pheniramine  1 drop Both Eyes BID    polyethylene glycol  17 g Oral BID    lidocaine 1 % injection  5 mL Intradermal Once    spironolactone  12.5 mg Oral Daily    potassium chloride  40 mEq Oral BID WC    metoprolol succinate  25 mg Oral Daily    aspirin  81 mg Oral Daily    gabapentin  300 mg Oral Nightly    montelukast  10 mg Oral Nightly    pantoprazole  40 mg Oral QAM AC    sodium chloride flush  5-40 mL Intravenous 2 times per day    insulin lispro  0-12 Units Subcutaneous TID WC    insulin lispro  0-6 Units Subcutaneous Nightly      sodium chloride 25 mL (04/30/21 1015)    sodium chloride      furosemide (LASIX) 1mg/ml infusion 15 mg/hr (04/30/21 1225)    milrinone 0.2 mcg/kg/min (04/30/21 1020)    sodium chloride      dextrose       Lab Data: Relevant and available CV data reviewed    Plan:  *CM  Status EF drop to 10-15%, etiology - CAD v afib v AS v idiopathic  Plan JUDE Monday   LHC when creatinine permits   Diuresis, inotropes per CHF  *AS  Status   TTE 10/18: 50%, MG 12 /// 9/19: 50%, MG 13 /// 9/20: 30%, MG 16   4/21: EF 10%, MVMG 3, AS MG 14  STTE 11/20: MG 20 @20 mcg  Plan JUDE on Monday to better assess AV stenosis  *MR  Status S/p MVR 8/18 bio, MG 7  Plan observation   *CAD  Status L-LAD, S-D and PLB  LHC 8/18: MVD->CABG  Plan LHC when creatinine permits, Monday? *DVT/PE  Status On coumadin  Plan Hold coumadin for upcoming JUDE and LHC  *AFIB  Status Hx WAYNE clip, hx ablation  Plan Per ep  *DISPOSITION  Status Not ready for discharge, patient and family want any and all testing or procedures that may prolong life    Time:  More than 35 minutes spent reviewing patient chart (including but not limited to notes, labs, imaging and other testing), interviewing patient and/or family members, performing a physical exam, documentation of my findings above and subsequent follow-up of ordered testing. More than 50% of that time spent face to face with patient discussing clinical condition and counseling regarding treatment plan.

## 2021-04-30 NOTE — PLAN OF CARE
Pt A/Ox4 throughout shift, respirations even/unlabored on RA. Lasix gtt and Primacor gtt infusing through PICC, good UO noted, monitor I/Os, K+ replaced per protocol, prn zofran x1 for nausea after ambulating to bathroom. Family at bedside, updated on plan of care, no needs at this time. Fall precautions in place, call light in reach. Vitals are stable.    Vitals:    04/30/21 0204   BP: 119/71   Pulse: 86   Resp: 16   Temp:    SpO2: 92%     Problem: Pain:  Goal: Control of acute pain  Description: Control of acute pain  4/30/2021 0333 by Keaton Don RN  Outcome: Ongoing     Problem: Cardiovascular  Goal: No DVT, peripheral vascular complications  8/28/3805 0333 by Keaton Don RN  Outcome: Ongoing     Problem: Cardiovascular  Goal: Hemodynamic stability  4/30/2021 0333 by Keaton Don RN  Outcome: Ongoing     Problem: Cardiovascular  Goal: Anticoagulate/Hct stable  4/30/2021 0333 by Keaton Don RN  Outcome: Ongoing     Problem: Cardiovascular  Goal: Agreement to quit smoking  4/30/2021 0333 by Keaton Don RN  Outcome: Ongoing     Problem: Cardiovascular  Goal: Weight maintained or lost  4/30/2021 0333 by Keaton Don RN  Outcome: Ongoing     Problem: Cardiovascular  Goal: Understanding of dietary restrictions  4/30/2021 0333 by Keaton Don RN  Outcome: Ongoing     Problem: Respiratory  Goal: No pulmonary complications  7/09/7950 0333 by Keaton Don RN  Outcome: Ongoing     Problem: Respiratory  Goal: O2 Sat > 90%  4/30/2021 0333 by Keaton Don RN  Outcome: Ongoing     Problem: Respiratory  Goal: Supplemental O2 requirements decreased  4/30/2021 0333 by Keaton Don RN  Outcome: Ongoing     Problem: Respiratory  Goal: Agreement to quit smoking  4/30/2021 0333 by Keaton Don RN  Outcome: Ongoing     Problem: Respiratory  Goal: Pneumonia vaccine at discharge as needed  4/30/2021 0333 by Keaton Don RN  Outcome: Ongoing     Problem: Serum Glucose Level - Abnormal:  Goal: Ability to maintain appropriate glucose levels has stabilized  Description: Ability to maintain appropriate glucose levels has stabilized  4/30/2021 0333 by Mayra Mukherjee RN  Outcome: Ongoing     Problem: Musculor/Skeletal Functional Status  Goal: Highest potential functional level  4/30/2021 0333 by Mayra Mukherjee RN  Outcome: Ongoing     Problem: Musculor/Skeletal Functional Status  Goal: Absence of falls  4/30/2021 0333 by Mayra Mukherjee RN  Outcome: Ongoing

## 2021-04-30 NOTE — PLAN OF CARE
Problem: Pain:  Goal: Control of acute pain  Description: Control of acute pain  4/30/2021 1518 by Leydi Bob RN  Outcome: Ongoing    Chronic back pain, pain meds per [facility]     Problem: Cardiovascular  Goal: Weight maintained or lost  4/30/2021 1518 by Leydi Bob RN  Outcome: Ongoing    Down 4 lbs since yesterday      Problem: Cardiovascular  Goal: Understanding of dietary restrictions  4/30/2021 1518 by Leydi Bob RN  Outcome: Ongoing     Problem: Musculor/Skeletal Functional Status  Goal: Highest potential functional level  4/30/2021 1518 by Leydi Bob RN  Outcome: Ongoing     Problem: Musculor/Skeletal Functional Status  Goal: Absence of falls  4/30/2021 1518 by Leydi Bob RN  Outcome: Met This Shift

## 2021-04-30 NOTE — CARE COORDINATION
Met with patient and her spouse. She still hopes to be able to discharge home with spouse, and home care pending therapy recommendations. The Plan for Transition of Care is related to the following treatment goals: home care    The Patient and/or patient representative was provided with a choice of provider and agrees   with the discharge plan. [x] Yes [] No    Freedom of choice list was provided with basic dialogue that supports the patient's individualized plan of care/goals, treatment preferences and shares the quality data associated with the providers. [x] Yes [] No  Referred to [facility] to follow along pending a home care order. Per MD- Cardio considering LHC vs JUDE to evaluate aortic valve probably 5-3-21.

## 2021-05-01 LAB
ALBUMIN SERPL-MCNC: 3 G/DL (ref 3.4–5)
ANION GAP SERPL CALCULATED.3IONS-SCNC: 11 MMOL/L (ref 3–16)
BASOPHILS ABSOLUTE: 0.1 K/UL (ref 0–0.2)
BASOPHILS RELATIVE PERCENT: 1.1 %
BUN BLDV-MCNC: 29 MG/DL (ref 7–20)
CALCIUM SERPL-MCNC: 8.8 MG/DL (ref 8.3–10.6)
CHLORIDE BLD-SCNC: 89 MMOL/L (ref 99–110)
CO2: 35 MMOL/L (ref 21–32)
CREAT SERPL-MCNC: 1.8 MG/DL (ref 0.6–1.2)
EOSINOPHILS ABSOLUTE: 0.4 K/UL (ref 0–0.6)
EOSINOPHILS RELATIVE PERCENT: 6.6 %
GFR AFRICAN AMERICAN: 33
GFR NON-AFRICAN AMERICAN: 27
GLUCOSE BLD-MCNC: 151 MG/DL (ref 70–99)
GLUCOSE BLD-MCNC: 153 MG/DL (ref 70–99)
GLUCOSE BLD-MCNC: 155 MG/DL (ref 70–99)
GLUCOSE BLD-MCNC: 157 MG/DL (ref 70–99)
GLUCOSE BLD-MCNC: 163 MG/DL (ref 70–99)
GLUCOSE BLD-MCNC: 285 MG/DL (ref 70–99)
HCT VFR BLD CALC: 32.6 % (ref 36–48)
HEMOGLOBIN: 10.4 G/DL (ref 12–16)
INR BLD: 1.64 (ref 0.86–1.14)
LYMPHOCYTES ABSOLUTE: 0.8 K/UL (ref 1–5.1)
LYMPHOCYTES RELATIVE PERCENT: 13.3 %
MAGNESIUM: 2 MG/DL (ref 1.8–2.4)
MCH RBC QN AUTO: 26.3 PG (ref 26–34)
MCHC RBC AUTO-ENTMCNC: 31.8 G/DL (ref 31–36)
MCV RBC AUTO: 82.7 FL (ref 80–100)
MONOCYTES ABSOLUTE: 0.9 K/UL (ref 0–1.3)
MONOCYTES RELATIVE PERCENT: 14.2 %
NEUTROPHILS ABSOLUTE: 3.9 K/UL (ref 1.7–7.7)
NEUTROPHILS RELATIVE PERCENT: 64.8 %
PDW BLD-RTO: 16.9 % (ref 12.4–15.4)
PERFORMED ON: ABNORMAL
PHOSPHORUS: 3.4 MG/DL (ref 2.5–4.9)
PLATELET # BLD: 247 K/UL (ref 135–450)
PMV BLD AUTO: 7.5 FL (ref 5–10.5)
POTASSIUM SERPL-SCNC: 3 MMOL/L (ref 3.5–5.1)
POTASSIUM SERPL-SCNC: 3.4 MMOL/L (ref 3.5–5.1)
PROTHROMBIN TIME: 19.1 SEC (ref 10–13.2)
RBC # BLD: 3.94 M/UL (ref 4–5.2)
SODIUM BLD-SCNC: 135 MMOL/L (ref 136–145)
WBC # BLD: 6 K/UL (ref 4–11)

## 2021-05-01 PROCEDURE — 6370000000 HC RX 637 (ALT 250 FOR IP): Performed by: INTERNAL MEDICINE

## 2021-05-01 PROCEDURE — 84132 ASSAY OF SERUM POTASSIUM: CPT

## 2021-05-01 PROCEDURE — 2580000003 HC RX 258: Performed by: INTERNAL MEDICINE

## 2021-05-01 PROCEDURE — 6360000002 HC RX W HCPCS: Performed by: NURSE PRACTITIONER

## 2021-05-01 PROCEDURE — 83735 ASSAY OF MAGNESIUM: CPT

## 2021-05-01 PROCEDURE — 80069 RENAL FUNCTION PANEL: CPT

## 2021-05-01 PROCEDURE — 85025 COMPLETE CBC W/AUTO DIFF WBC: CPT

## 2021-05-01 PROCEDURE — 99233 SBSQ HOSP IP/OBS HIGH 50: CPT | Performed by: NURSE PRACTITIONER

## 2021-05-01 PROCEDURE — 6360000002 HC RX W HCPCS: Performed by: INTERNAL MEDICINE

## 2021-05-01 PROCEDURE — 2580000003 HC RX 258: Performed by: NURSE PRACTITIONER

## 2021-05-01 PROCEDURE — 6370000000 HC RX 637 (ALT 250 FOR IP): Performed by: NURSE PRACTITIONER

## 2021-05-01 PROCEDURE — 85610 PROTHROMBIN TIME: CPT

## 2021-05-01 PROCEDURE — 2060000000 HC ICU INTERMEDIATE R&B

## 2021-05-01 RX ORDER — CEFTRIAXONE 1 G/1
1000 INJECTION, POWDER, FOR SOLUTION INTRAMUSCULAR; INTRAVENOUS EVERY 24 HOURS
Status: DISCONTINUED | OUTPATIENT
Start: 2021-05-01 | End: 2021-05-01 | Stop reason: ALTCHOICE

## 2021-05-01 RX ADMIN — SPIRONOLACTONE 12.5 MG: 25 TABLET ORAL at 09:14

## 2021-05-01 RX ADMIN — INSULIN LISPRO 2 UNITS: 100 INJECTION, SOLUTION INTRAVENOUS; SUBCUTANEOUS at 12:07

## 2021-05-01 RX ADMIN — FUROSEMIDE 15 MG/HR: 10 INJECTION, SOLUTION INTRAMUSCULAR; INTRAVENOUS at 10:06

## 2021-05-01 RX ADMIN — POTASSIUM CHLORIDE 20 MEQ: 29.8 INJECTION, SOLUTION INTRAVENOUS at 10:33

## 2021-05-01 RX ADMIN — AMIODARONE HYDROCHLORIDE 200 MG: 200 TABLET ORAL at 09:15

## 2021-05-01 RX ADMIN — FUROSEMIDE 5 MG/HR: 10 INJECTION, SOLUTION INTRAMUSCULAR; INTRAVENOUS at 18:34

## 2021-05-01 RX ADMIN — MUPIROCIN: 20 OINTMENT TOPICAL at 21:34

## 2021-05-01 RX ADMIN — INSULIN LISPRO 2 UNITS: 100 INJECTION, SOLUTION INTRAVENOUS; SUBCUTANEOUS at 09:18

## 2021-05-01 RX ADMIN — Medication 10 ML: at 21:35

## 2021-05-01 RX ADMIN — POLYETHYLENE GLYCOL 3350 17 G: 17 POWDER, FOR SOLUTION ORAL at 09:11

## 2021-05-01 RX ADMIN — POTASSIUM CHLORIDE 40 MEQ: 1500 TABLET, EXTENDED RELEASE ORAL at 18:02

## 2021-05-01 RX ADMIN — FUROSEMIDE 15 MG/HR: 10 INJECTION, SOLUTION INTRAMUSCULAR; INTRAVENOUS at 02:18

## 2021-05-01 RX ADMIN — ONDANSETRON 4 MG: 2 INJECTION INTRAMUSCULAR; INTRAVENOUS at 18:11

## 2021-05-01 RX ADMIN — MILRINONE LACTATE 0.2 MCG/KG/MIN: 200 INJECTION, SOLUTION INTRAVENOUS at 22:59

## 2021-05-01 RX ADMIN — POTASSIUM CHLORIDE 40 MEQ: 1500 TABLET, EXTENDED RELEASE ORAL at 09:15

## 2021-05-01 RX ADMIN — Medication 40 ML: at 18:13

## 2021-05-01 RX ADMIN — INSULIN LISPRO 6 UNITS: 100 INJECTION, SOLUTION INTRAVENOUS; SUBCUTANEOUS at 18:02

## 2021-05-01 RX ADMIN — OXYCODONE 5 MG: 5 TABLET ORAL at 12:11

## 2021-05-01 RX ADMIN — METOPROLOL SUCCINATE 25 MG: 25 TABLET, EXTENDED RELEASE ORAL at 09:14

## 2021-05-01 RX ADMIN — Medication 1000 MG: at 18:03

## 2021-05-01 RX ADMIN — PROMETHAZINE HYDROCHLORIDE 12.5 MG: 25 TABLET ORAL at 19:34

## 2021-05-01 RX ADMIN — NAPHAZOLINE HYDROCHLORIDE AND PHENIRAMINE MALEATE 1 DROP: .25; 3 SOLUTION/ DROPS OPHTHALMIC at 09:17

## 2021-05-01 RX ADMIN — OXYCODONE AND ACETAMINOPHEN 1 TABLET: 325; 10 TABLET ORAL at 02:11

## 2021-05-01 RX ADMIN — POTASSIUM CHLORIDE 20 MEQ: 29.8 INJECTION, SOLUTION INTRAVENOUS at 09:25

## 2021-05-01 RX ADMIN — PANTOPRAZOLE SODIUM 40 MG: 40 TABLET, DELAYED RELEASE ORAL at 05:17

## 2021-05-01 RX ADMIN — MILRINONE LACTATE 0.2 MCG/KG/MIN: 200 INJECTION, SOLUTION INTRAVENOUS at 05:17

## 2021-05-01 RX ADMIN — NAPHAZOLINE HYDROCHLORIDE AND PHENIRAMINE MALEATE 1 DROP: .25; 3 SOLUTION/ DROPS OPHTHALMIC at 21:34

## 2021-05-01 RX ADMIN — POTASSIUM CHLORIDE 20 MEQ: 29.8 INJECTION, SOLUTION INTRAVENOUS at 11:38

## 2021-05-01 RX ADMIN — MONTELUKAST SODIUM 10 MG: 10 TABLET, FILM COATED ORAL at 21:34

## 2021-05-01 RX ADMIN — INSULIN LISPRO 1 UNITS: 100 INJECTION, SOLUTION INTRAVENOUS; SUBCUTANEOUS at 21:35

## 2021-05-01 RX ADMIN — ASPIRIN 81 MG: 81 TABLET, CHEWABLE ORAL at 09:14

## 2021-05-01 RX ADMIN — ONDANSETRON 4 MG: 2 INJECTION INTRAMUSCULAR; INTRAVENOUS at 12:11

## 2021-05-01 RX ADMIN — OXYCODONE AND ACETAMINOPHEN 1 TABLET: 325; 10 TABLET ORAL at 09:13

## 2021-05-01 RX ADMIN — METOLAZONE 5 MG: 2.5 TABLET ORAL at 09:13

## 2021-05-01 RX ADMIN — INSULIN GLARGINE 10 UNITS: 100 INJECTION, SOLUTION SUBCUTANEOUS at 21:35

## 2021-05-01 RX ADMIN — Medication 10 ML: at 09:21

## 2021-05-01 ASSESSMENT — PAIN DESCRIPTION - LOCATION: LOCATION: BACK;NECK

## 2021-05-01 ASSESSMENT — PAIN SCALES - GENERAL: PAINLEVEL_OUTOF10: 0

## 2021-05-01 NOTE — PROGRESS NOTES
[address]   Cardiology Progress Note   Date: 5/1/2021  Admit Date: 4/15/2021     Reason for follow up: Atrial fibrillation, CHF, AS    Chief Complaint:   Chief Complaint   Patient presents with    Chest Pain    Back Pain     History of Present Illness: History obtained from patient and medical record. Antonieta Nichole is a 77 y.o. female with a past medical history of HTN, HLD, DM, CAD s/p CABG x 3, S/p bioprosthetic MVR, WAYNE clip (8/2018), afib s/p Maze 2018, CMP (EF25-30%) DVT/PE on warfarin and sCHF follows with HF. S/p ablation of afib w/ PVI, roofline, posterior, CTI flutter 10/30/2019. S/p ILR. Hx of Mobitz I AVB and prolonged MS interval. S/p JUDE/CV. Has been treated with amiodarone stopped in 2018. Presented with palpitations and tachycardia, found to be in afib/RVR and started on diltiazem gtt. Reportedly her carvedilol was reduced recently. She has been treated with IV milrinone and lasix gtt for prolonged period of time. metolazone was added with some effect. Dr. Yang Garcia has evaluated for AS and plans for JUDE on Monday. Interval Hx: Today, she is being seen for follow up. Down 13 L, Weight down 8 kg. Remains on lasix gtt and milrinone. Cr 1.3-->1.5-->1.8 today. SOB improving, continues with BLE edema. No new complaints today. No major events overnight. Denies having chest pain, palpitations,  or dizziness. Patient seen and examined. Clinical notes reviewed. Telemetry reviewed.     Problem List:   Patient Active Problem List    Diagnosis Date Noted    Acute on chronic systolic CHF (congestive heart failure) ([facility] [address] 04/26/2021    Hypoglycemia 04/26/2021    Atrial fibrillation with rapid ventricular response (HCC)     Chest pain     Dyspnea on exertion     Acute kidney injury superimposed on CKD ([address]     Hypotension     Acute on chronic systolic heart failure due to valvular disease ([address] 02/26/2021    Stage 3 chronic kidney disease     Coronary artery disease involving native heart without angina pectoris     Deep vein thrombosis (DVT) of non-extremity vein 12/15/2020    Aortic valve stenosis 11/03/2020    Pneumatosis intestinalis of small intestine 05/10/2020    Ischemic cardiomyopathy 01/20/2020    Occlusion and stenosis of bilateral carotid arteries 01/20/2020    Persistent atrial fibrillation ([address] 10/30/2019    S/P MVR (mitral valve repair)     Thoracic degenerative disc disease 06/07/2019    Chronic systolic CHF (congestive heart failure), NYHA class 3 ([address] 01/15/2019    Obesity, Class I, BMI 30-34.9     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 pulmonary nodules 08/01/2016    History of pulmonary embolism     B12 deficiency 09/08/2014    Paroxysmal SVT (supraventricular tachycardia) ([address] 33/34/2879    Eosinophilic gastritis 26/57/8008    Generalized osteoarthrosis, involving multiple sites 03/25/2013    Long term current use of anticoagulant 12/19/2012    Hypercholesteremia 12/19/2012      Assessment and Plan:  Persistent Atrial Fibrillation RVR   - Remains in afib/RVR   - On Coreg and amiodarone  200 mg daily      ~ TSH and LFT OK   - S/p WAYNE clip 8/2018, no AC needed from afib standpoint   - Failed cardioversion 4/20/2021  Acute on Chronic systolic CHF/Cardiomyopathy   -  Appears overloaded, however significantly improved, SOB improved and no orthopnea   - EF worsening 10-15% per echo this admission   - On milrinone and lasix gtt   - Weight down 8 kg, down 13 L   - AICD will be considered as OP   Aortic Insufficiency   - Plans for JUDE Moday to evaluate with Dr. Olga Tilley     - Denies orthopnea  CAD   - Stable   - No reports of angina   - Continue medical therapy  HTN   - Controlled. No medication changes, BP is soft  PVCs   - likely secondary to hypokalemia   - IV replacement of K+ recommended    - Keep mag >2 and K >4  CKD   - Trending up   - Can switch to PO Torsemide     - Although she does have some signs of mild overload, she is significantly improved and renal fx worsening today on gtt, will consider stopping lasix gtt today and starting torsemide 100 mg daily PO, appreciate nephrology recommendations  - JUDE Monday     All pertinent information and plan of care discussed with the EP physician. Multiple medical conditions with risk of decompensation. Allergies: Allergies   Allergen Reactions    Plhnykfg-Ixzslrf-Zmrmrs [Fluocinolone] Shortness Of Breath    Ciprofloxacin Shortness Of Breath    Diovan [Valsartan] Shortness Of Breath    Flagyl [Metronidazole] Shortness Of Breath     Has taken diflucan at home 12/7/15    Metformin And Related [Metformin And Related] Shortness Of Breath    Benazepril      Other reaction(s): Not Recorded    Morphine      Bad reaction. \"makes her feel horrible\".  Saxagliptin      Other reaction(s): Not Recorded    Levaquin [Levofloxacin] Rash     Home Meds:  Prior to Visit Medications    Medication Sig Taking? Authorizing Provider   spironolactone (ALDACTONE) 25 MG tablet Take 2 tablets by mouth daily Yes SOLEDAD Freeman - CNS   torsemide (DEMADEX) 20 MG tablet 40mg morning, 40mg afternoon, 20mg evening Yes Roberto Carlos Cooper MD   carvedilol (COREG) 6.25 MG tablet Take 1 tablet by mouth daily Yes Roberto Carlos Cooper MD   gabapentin (NEURONTIN) 300 MG capsule Take 1 capsule by mouth nightly for 90 days.  Intended supply: 30 days Yes Rosy Enamorado MD   insulin glargine (LANTUS SOLOSTAR) 100 UNIT/ML injection pen INJECT 26 UNITS IN THE MORNING AND 18 UNITS AT BEDTIME Yes Zeinab Pratt MD   exenatide (BYETTA 10 MCG PEN) 10 MCG/0.04ML injection Inject 0.04 mLs into the skin 2 times daily (with meals) Yes Isis Alvarez MD   OXYCODONE HCL PO Take 10 mg by mouth As of 1/21/2021,  states patient is taking 10mg with edge being taken off her pain after 3 hours.  Yes Historical Provider, MD   ACETAMINOPHEN PO Take 500 mg by mouth every 6 hours as needed  Yes Historical Provider, MD   albuterol sulfate  (90 Base) MCG/ACT inhaler USE 2 INHALATIONS EVERY 6 HOURS AS NEEDED FOR WHEEZING Yes Isis Alvarez MD   albuterol (PROVENTIL) (2.5 MG/3ML) 0.083% nebulizer solution Take 3 mLs by nebulization every 6 hours as needed for Wheezing Yes Isis Alvarez MD   polyethylene glycol (GLYCOLAX) 17 GM/SCOOP powder Take 17 g by mouth every other day  Yes Historical Provider, MD   omeprazole (PRILOSEC) 20 MG delayed release capsule Take 20 mg by mouth daily Yes Historical Provider, MD   ondansetron (ZOFRAN) 4 MG tablet Take 1 tablet by mouth 3 times daily as needed for Nausea or Vomiting Yes Isis Alvarez MD   aspirin 81 MG chewable tablet Take 1 tablet by mouth daily Yes Isis Alvarez MD   vitamin B-12 500 MCG tablet Take 1 tablet by mouth daily Yes Stan Shultz MD   potassium chloride (KLOR-CON M) 10 MEQ extended release tablet TAKE 1 TABLET DAILY  Isis Alvarez MD   predniSONE (DELTASONE) 10 MG tablet TAKE 1 TABLET AS NEEDED (EOSINOPHILIC GASATRITIS)  Isis Alvarez MD   montelukast (SINGULAIR) 10 MG tablet TAKE 1 TABLET NIGHTLY  Isis Alvarez MD   warfarin (COUMADIN) 5 MG tablet TAKE 1 TABLET DAILY  Isis Alvarez MD   blood glucose test strips (FREESTYLE LITE) strip USE TO TEST FOUR TIMES A DAY  Isis Alvarez MD   FreeStyle Lancets MISC 1 each by Does not apply route 4 times daily  Isis Alvarez MD   Blood Glucose Monitoring Suppl (EMBRACE PRO GLUCOSE METER) GAETANO 1 Device by Does not apply route 4 times daily  Jill Sultana MD   HUMALOG KWIKPEN 100 UNIT/ML SOPN TAKE AS INSTRUCTED BY YOUR PRESCRIBER  Patient taking differently:  Only if high blood sugar-has not been using  Jill Sultana MD   nystatin (MYCOSTATIN) 289635 UNIT/ML suspension TAKE ONE TEASPOONFUL BY MOUTH FOUR TIMES A DAY FOR 5 DAYS  Jill Sultana MD   BD PEN NEEDLE JANNET U/F 32G X 4 MM MISC USE 1 PEN NEEDLE FOUR TIMES A DAY  Jill Sultana MD      Scheduled Meds:   insulin glargine  10 Units Subcutaneous Nightly    lidocaine 1 % injection  5 mL Intradermal Once    warfarin (COUMADIN) daily dosing (placeholder)   Other RX Placeholder    metOLazone  5 mg Oral Daily    amiodarone  200 mg Oral Daily    naphazoline-pheniramine  1 drop Both Eyes BID    polyethylene glycol  17 g Oral BID    lidocaine 1 % injection  5 mL Intradermal Once    spironolactone  12.5 mg Oral Daily    potassium chloride  40 mEq Oral BID WC    metoprolol succinate  25 mg Oral Daily    aspirin  81 mg Oral Daily    gabapentin  300 mg Oral Nightly    montelukast  10 mg Oral Nightly    pantoprazole  40 mg Oral QAM AC    sodium chloride flush  5-40 mL Intravenous 2 times per day    insulin lispro  0-12 Units Subcutaneous TID WC    insulin lispro  0-6 Units Subcutaneous Nightly     Continuous Infusions:   sodium chloride 25 mL (04/30/21 1015)    sodium chloride      furosemide (LASIX) 1mg/ml infusion 15 mg/hr (05/01/21 1006)    milrinone 0.2 mcg/kg/min (05/01/21 0648)    sodium chloride      dextrose       PRN Meds:sodium chloride, potassium chloride, bisacodyl, diphenhydrAMINE, oxyCODONE-acetaminophen, sodium chloride flush, sodium chloride, oxyCODONE, albuterol, sodium chloride, promethazine **OR** ondansetron, polyethylene glycol, acetaminophen **OR** acetaminophen, glucose, dextrose, glucagon (rDNA), dextrose   Past Medical History:  Past Medical History:   Diagnosis Date    Asthma     Atrial fibrillation [facility] [address]     32 mL/min (A) (based on SCr of 1.8 mg/dL (H)). CBC:   Recent Labs     21  0418 21  0329 21  0520   WBC 4.3 5.2 6.0   HGB 10.3* 10.3* 10.4*   HCT 32.3* 32.3* 32.6*   MCV 83.4 83.3 82.7    225 247     Thyroid:   Lab Results   Component Value Date    TSH 1.90 2021     Lipids:   Lab Results   Component Value Date    CHOL 149 2020    HDL 40 2020    TRIG 94 2021     LFTS:   Lab Results   Component Value Date    ALT 10 04/15/2021    AST 15 04/15/2021    ALKPHOS 215 04/15/2021    PROT 6.6 04/15/2021    AGRATIO 0.8 04/15/2021    BILITOT 0.6 04/15/2021     Cardiac Enzymes:   Lab Results   Component Value Date    TROPONINI 0.02 04/15/2021    TROPONINI 0.02 04/15/2021    TROPONINI <0.01 05/10/2020     Coags:   Lab Results   Component Value Date    PROTIME 19.1 2021    INR 1.64 2021     EC/15/2021: afib/RVR    ECHO:  2021  Summary   Overall left ventricular systolic function is severely depressed . Ejection fraction is visually estimated to be 10-15 %. E/e'= 23.2 GLS=-3.4   Moderately dilated left ventricle. There is severe diffuse hypokinesis. Indeterminate diastolic function. A bioprosthetic mitral valve is well seated with peak velocity of 1.59m/s   and a mean gradient of 3mmHg. The left atrium is moderately dilated. Mild aortic stenosis with a peak velocity of 2.5m/s and a mean pressure gradient of 14mmHg. The aortic valve is thickened/calcified with decreased leaflet mobility   consistent with aortic stenosis. Mild to moderate tricuspid regurgitation. Estimated pulmonary artery systolic pressure is mildly to moderately elevated at 46 mmHg assuming a right atrial pressure of 8 mmHg. 2020  Summary   Dobutamine echocardiogram for aortic stenosis. Very technically limited exam due to atrial fibrillation.    Baseline echocardiogram shows severe left ventricular dysfunction with   anterior, lateral and apical hypokinesis with an ejection fraction of 20-25   %. There is no improvement in wall motion with low or high dose dobutamine   suggestive of nonviable myocardium. Mild prosthetic aortic valve stenosis with a mean gradient of 16mmHg and   peak velocity of 2.47m/s at rest. After dobutamine stress the mean AV   gradient rises to 20mmHg with 20mcg of dobutamine consistent with mild to   moderate aortic stenosis. Prosthetic mitral valve with a mean gradient of 7mmHg  9/15/2020  Summary   Left ventricular cavity size is dilated. There is normal wall thickness with a moderately severe reduction in   systolic function. LV ejection fraction is visually estimated to be 25-30%. Indeterminate diastolic function. The right ventricle is not well visualized but appears to be normal in size   with moderately reduced function. The left atrium is moderately dilated. A bioprosthetic mitral valve with a mean gradient of 7 mmHg. This may be a   normal gradient for this valve but could suggest mild stenosis. Trivial mitral regurgitation. The aortic valve is thickened/calcified with a mean gradient of 16 mmHg   consistent with at least mild aortic stenosis. This is likely underestimated   due to low cardiac output secondary to LV dysfunction. No significant aortic valve regurgitation. Moderate tricuspid regurgitation with RVSP of 48 mmHg. Stress Test: 8/7/2018  Summary    Small sized lateral completely reversible defect of mild intensity    consistent with ischemia in the territory of the LCx and/or LAD .    Normal LV function.    Overall findings represent a intermediate risk scan.         All questions and concerns were addressed to the patient. Alternatives to my treatment were discussed. I have discussed the above stated plan with patient and the nurse. The patient verbalized understanding and agreed with the plan. Thank you for allowing to us to participate in the care of Ezequiel Leahy.     Evaristo Singer, SOLEDAD-JOSE L  [facility]   Office: [phone]    I have spent 35 minutes of face to face time with the patient with more than 50% spent counseling and coordinating care for Amaury Angel.

## 2021-05-01 NOTE — PROGRESS NOTES
[facility] HOSPITALISTS PROGRESS NOTE    5/1/2021 8:49 AM        Name: Vicente Ardon . Admitted: 4/15/2021  Primary Care Provider: Della Moulton MD (Tel: [phone])                        Hospital course:   78 yo F with CAD s/p CABG, CKD 3, chronic systolic CHF, Afib, DM 2, obesity who was admitted with chest pain and back pain found to have A. fib with RVR along with acute on chronic systolic heart failure with worsening cardiomyopathy. Has been diuresed with Milrinone gtt, Lasix gtt and Zaroxolyn. Cardio and Renal following. No plan for ICD at this time. Cardio considering LHC vs JUDE to evaluate aortic valve once diuresed, hopefully 5/3/21. Subjective:  . No acute events overnight. Resting well. Pain control. Diet ok. Labs reviewed  Denies any chest pain sob.      Reviewed interval ancillary notes    Current Medications  insulin glargine (LANTUS;BASAGLAR) injection pen 10 Units, Nightly  lidocaine PF 1 % injection 5 mL, Once  0.9 % sodium chloride infusion, PRN  potassium chloride 20 mEq/50 mL IVPB (Central Line), PRN  warfarin (COUMADIN) daily dosing (placeholder), RX Placeholder  metOLazone (ZAROXOLYN) tablet 5 mg, Daily  amiodarone (CORDARONE) tablet 200 mg, Daily  naphazoline-pheniramine (NAPHCON-A) 0.025-0.3 % ophthalmic solution 1 drop, BID  bisacodyl (DULCOLAX) suppository 10 mg, Daily PRN  polyethylene glycol (GLYCOLAX) packet 17 g, BID  diphenhydrAMINE (BENADRYL) tablet 25 mg, Q6H PRN  oxyCODONE-acetaminophen (PERCOCET)  MG per tablet 1 tablet, Q6H PRN  lidocaine PF 1 % injection 5 mL, Once  sodium chloride flush 0.9 % injection 5-40 mL, PRN  0.9 % sodium chloride infusion, PRN  spironolactone (ALDACTONE) tablet 12.5 mg, Daily  oxyCODONE (ROXICODONE) immediate release tablet 5 mg, Q4H PRN  potassium chloride (KLOR-CON M) extended release tablet 40 mEq, BID WC  furosemide (LASIX) 100 mg in dextrose 5 % 100 mL infusion, Continuous  milrinone (PRIMACOR) 20 mg in dextrose 5 % 100 mL infusion, Continuous  metoprolol succinate (TOPROL XL) extended release tablet 25 mg, Daily  albuterol (PROVENTIL) nebulizer solution 2.5 mg, Q6H PRN  aspirin chewable tablet 81 mg, Daily  gabapentin (NEURONTIN) capsule 300 mg, Nightly  montelukast (SINGULAIR) tablet 10 mg, Nightly  pantoprazole (PROTONIX) tablet 40 mg, QAM AC  sodium chloride flush 0.9 % injection 5-40 mL, 2 times per day  0.9 % sodium chloride infusion, PRN  promethazine (PHENERGAN) tablet 12.5 mg, Q6H PRN    Or  ondansetron (ZOFRAN) injection 4 mg, Q6H PRN  polyethylene glycol (GLYCOLAX) packet 17 g, Daily PRN  acetaminophen (TYLENOL) tablet 650 mg, Q6H PRN    Or  acetaminophen (TYLENOL) suppository 650 mg, Q6H PRN  insulin lispro (1 Unit Dial) 0-12 Units, TID WC  insulin lispro (1 Unit Dial) 0-6 Units, Nightly  glucose (GLUTOSE) 40 % oral gel 15 g, PRN  dextrose 50 % IV solution, PRN  glucagon (rDNA) injection 1 mg, PRN  dextrose 5 % solution, PRN        Objective:  /71   Pulse 100   Temp 98.1 °F (36.7 °C) (Oral)   Resp 16   Ht 5' 8\" (1.727 m)   Wt 192 lb 14.4 oz (87.5 kg)   SpO2 94%   BMI 29.33 kg/m²     Intake/Output Summary (Last 24 hours) at 5/1/2021 0849  Last data filed at 5/1/2021 2608  Gross per 24 hour   Intake 2742.15 ml   Output 3780 ml   Net -1037.85 ml      Wt Readings from Last 3 Encounters:   04/30/21 192 lb 14.4 oz (87.5 kg)   04/13/21 205 lb (93 kg)   04/02/21 206 lb 9.6 oz (93.7 kg)       General appearance:   elderly lady, Appears comfortable  Eyes: Sclera clear. Pupils equal.  ENT: Moist oral mucosa. Trachea midline, no adenopathy, neck supple. Cardiovascular: Regular rhythm, normal S1, S2. No murmur. No edema in lower extremities  Respiratory: Not using accessory muscles. Good inspiratory effort. Clear to auscultation bilaterally, no wheeze or crackles.    GI: Abdomen soft, no tenderness, not distended, normal bowel sounds  Musculoskeletal: No deformity, ROM WNL. Neurology: CN 2-12 grossly intact. No speech or motor deficits  Psych: Normal affect. Alert and oriented in time, place and person  Skin: Warm, dry, normal turgor    Labs and Tests:  CBC:   Recent Labs     04/29/21 0418 04/30/21  0329 05/01/21  0520   WBC 4.3 5.2 6.0   HGB 10.3* 10.3* 10.4*   HCT 32.3* 32.3* 32.6*   MCV 83.4 83.3 82.7    225 247     BMP:    Recent Labs     04/29/21 0418 04/29/21  0418 04/30/21  0329 04/30/21  0933 04/30/21  1400 05/01/21  0520   *  --  136  --   --  135*   K 2.8*   < > 3.3* 3.4* 4.1 3.0*   CL 94*  --  94*  --   --  89*   CO2 31  --  34*  --   --  35*   BUN 23*  --  22*  --   --  29*   CREATININE 1.3*  --  1.5*  --   --  1.8*   GLUCOSE 140*  --  168*  --   --  163*    < > = values in this interval not displayed. Problem List  Principal Problem:    Acute on chronic systolic CHF (congestive heart failure) ([facility])  Active Problems:    Type 2 diabetes mellitus with hyperglycemia, with long-term current use of insulin ([facility])    PAF (paroxysmal atrial fibrillation) ([facility])    S/P CABG x 3    Obesity, Class I, BMI 30-34.9    Chronic systolic CHF (congestive heart failure), NYHA class 3 ([facility])    Persistent atrial fibrillation ([facility])    S/P MVR (mitral valve repair)    Stage 3 chronic kidney disease    Coronary artery disease involving native heart without angina pectoris    Atrial fibrillation with rapid ventricular response ([facility])    Chest pain    Dyspnea on exertion    Acute kidney injury superimposed on CKD ([facility] [address]    Hypoglycemia  Resolved Problems:    * No resolved hospital problems. *       Assessment & Plan:   1.  Acute on chronic systolic heart failure: Continue with Lasix 15 mg/hr and milrinone drip, and 12.5 L negative balance since admission, patient remains fluid overloaded,Zaroxolyn continued,    JUDE or [facility] Monday 5/3 per cardiology.     2.Persistent atrial fibrillation: History of left atrial appendage clipping, failed cardioversion, holding Coumadin for ICD and continue amiodarone     3. TORIBIO on CKD: Creatinine 1.8 today, adjustment of diuretic drip as per nephrology and cardiology     4. Diabetes mellitus: Morning blood sugar 155 mg/dL, continue with Lantus 10 U qhs and sliding scale insulin. 5.  Hypokalemia: Continue replacement protocol. Diet: DIET CARDIAC; Low Sodium (2 GM);  Daily Fluid Restriction: 2000 ml  Dietary Nutrition Supplements: Diabetic Oral Supplement  Code:Full Code  DVT PPX lovenox       Dima Kenney MD   5/1/2021 8:49 AM

## 2021-05-01 NOTE — PROGRESS NOTES
Nephrology Attending  Progress Note        SUMMARY :  We are following this patient for TORIBIO on CKD stage 3   77 y.o. female who yesterday night started to have midsternal chest pain that was 7 out of 10 pressure-like associated with palpitations heart rate was up to 140s. Was short of breath no diaphoresis some nausea no vomiting. Patient came to the ED was in A. fib with RVR. Interval History (Chart/Data reviewed): BUN/Scr slight bump. Lasix gtt @ 15 /hr,contraction alkalosis; remains on milrinoine. Able to lay flat. Good UOP. RA      ROS/  (+) Some itching LE cellulitis  (-) n/v/d/f/c/cp/sob. Updated at bedside: Patient, her , RN  Past medical, family, and social histories were reviewed as previously documented. Updates were made as necessary. Review of Systems ROS with pertinent positives and negatives listed in interval history.          Physical Exam:    VITALS:  /71   Pulse 96   Temp 97.5 °F (36.4 °C) (Oral)   Resp 16   Ht 5' 8\" (1.727 m)   Wt 192 lb 14.4 oz (87.5 kg)   SpO2 93%   BMI 29.33 kg/m²   BLOOD PRESSURE RANGE:  Systolic (47ROG), LML:930 , Min:106 , VYW:030   ; Diastolic (07DXE), HQE:77, Min:67, Max:75    24HR INTAKE/OUTPUT:      Intake/Output Summary (Last 24 hours) at 5/1/2021 1717  Last data filed at 5/1/2021 0813  Gross per 24 hour   Intake 1335.09 ml   Output 3550 ml   Net -2214.91 ml   O > I       Gen:  alert, oriented x 3  PERRL , EOM +  Pallor +, No icterus  JVP not raised   CV: IRRR , Gr 3/6 systolic murmur heard all over precordium , especially at base of heart  Lungs:B/ L air entry, Normal breath sounds ,  No crackles   Abd: soft, bowel sounds + , No organomegaly   Ext: 1-2+ edema, no cyanosis, erythema + Shins  Neuro: nonfocal.      DATA:    CBC with Differential:    Lab Results   Component Value Date    WBC 6.0 05/01/2021    RBC 3.94 05/01/2021    HGB 10.4 05/01/2021    HCT 32.6 05/01/2021     05/01/2021    MCV 82.7 05/01/2021    MCH 26.3 05/01/2021 MCHC 31.8 05/01/2021    RDW 16.9 05/01/2021    SEGSPCT 64.1 09/02/2020    BANDSPCT 1 01/14/2019    LYMPHOPCT 13.3 05/01/2021    MONOPCT 14.2 05/01/2021    BASOPCT 1.1 05/01/2021    MONOSABS 0.9 05/01/2021    LYMPHSABS 0.8 05/01/2021    EOSABS 0.4 05/01/2021    BASOSABS 0.1 05/01/2021     CMP:    Lab Results   Component Value Date     05/01/2021    K 3.0 05/01/2021    K 3.5 04/21/2021    CL 89 05/01/2021    CO2 35 05/01/2021    BUN 29 05/01/2021    CREATININE 1.8 05/01/2021    GFRAA 33 05/01/2021    AGRATIO 0.8 04/15/2021    LABGLOM 27 05/01/2021    LABGLOM 45 12/06/2018    GLUCOSE 163 05/01/2021    PROT 6.6 04/15/2021    LABALBU 3.0 05/01/2021    CALCIUM 8.8 05/01/2021    BILITOT 0.6 04/15/2021    ALKPHOS 215 04/15/2021    AST 15 04/15/2021    ALT 10 04/15/2021     Magnesium:    Lab Results   Component Value Date    MG 2.00 05/01/2021     Phosphorus:    Lab Results   Component Value Date    PHOS 3.4 05/01/2021     Troponin:    Lab Results   Component Value Date    TROPONINI 0.02 04/15/2021     U/A:    Lab Results   Component Value Date    COLORU Yellow 04/15/2021    PROTEINU Negative 04/15/2021    PHUR 6.5 04/15/2021    WBCUA 3 04/15/2021    RBCUA 2 04/15/2021    YEAST neg 02/12/2021    BACTERIA trace 02/12/2021    BACTERIA 1+ 05/11/2020    CLARITYU Clear 04/15/2021    SPECGRAV 1.020 04/15/2021    LEUKOCYTESUR Negative 04/15/2021    UROBILINOGEN 0.2 04/15/2021    BILIRUBINUR Negative 04/15/2021    BLOODU TRACE-INTACT 04/15/2021    GLUCOSEU Negative 04/15/2021         IMPRESSION/RECOMMENDATIONS:      Diagnosis and Plan     1. TORIBIO   due to renal hypoperfusion secondary to hypotension as well as atrial fibrillation and diuretic use    2. Hypokalemia   Being Replaced po and IV , AM MG    3. CKD stage 3   Presumptive  diabetic nephropathy, however no proteinuria    Baseline  creatinine 1.1    4. HTN     5. Afib with RVR  Now controlled     6.  sCHF    acute on chronic exacerbation, recent EF 10 to 15%   Goal is to diurese her so that  she can lie flat to undergo a JUDE       Recommendations:     Reduced lasix gtt to 5 / hr due to contraction alkalosis. AM CXR/KUB     Cellulitis LE; likely strep/folliculitis. Ceftriaxone / Bactroban        Thank you for allowing us to participate in the care of this patient. Please call with any questions.   Signed:  Algis Harada, M.D.   [facility]  Office Number: [phone]  Fax Number: [phone]  5/1/2021, 5:18 PM

## 2021-05-02 ENCOUNTER — APPOINTMENT (OUTPATIENT)
Dept: GENERAL RADIOLOGY | Age: 76
DRG: 286 | End: 2021-05-02
Payer: MEDICARE

## 2021-05-02 LAB
ALBUMIN SERPL-MCNC: 3.1 G/DL (ref 3.4–5)
ALBUMIN SERPL-MCNC: 3.3 G/DL (ref 3.4–5)
ANION GAP SERPL CALCULATED.3IONS-SCNC: 10 MMOL/L (ref 3–16)
ANION GAP SERPL CALCULATED.3IONS-SCNC: 8 MMOL/L (ref 3–16)
BUN BLDV-MCNC: 34 MG/DL (ref 7–20)
BUN BLDV-MCNC: 37 MG/DL (ref 7–20)
CALCIUM SERPL-MCNC: 8.8 MG/DL (ref 8.3–10.6)
CALCIUM SERPL-MCNC: 8.9 MG/DL (ref 8.3–10.6)
CHLORIDE BLD-SCNC: 89 MMOL/L (ref 99–110)
CHLORIDE BLD-SCNC: 90 MMOL/L (ref 99–110)
CO2: 33 MMOL/L (ref 21–32)
CO2: 37 MMOL/L (ref 21–32)
CREAT SERPL-MCNC: 1.6 MG/DL (ref 0.6–1.2)
CREAT SERPL-MCNC: 1.8 MG/DL (ref 0.6–1.2)
GFR AFRICAN AMERICAN: 33
GFR AFRICAN AMERICAN: 38
GFR NON-AFRICAN AMERICAN: 27
GFR NON-AFRICAN AMERICAN: 31
GLUCOSE BLD-MCNC: 119 MG/DL (ref 70–99)
GLUCOSE BLD-MCNC: 154 MG/DL (ref 70–99)
GLUCOSE BLD-MCNC: 156 MG/DL (ref 70–99)
GLUCOSE BLD-MCNC: 200 MG/DL (ref 70–99)
GLUCOSE BLD-MCNC: 228 MG/DL (ref 70–99)
GLUCOSE BLD-MCNC: 240 MG/DL (ref 70–99)
GLUCOSE BLD-MCNC: 247 MG/DL (ref 70–99)
INR BLD: 1.43 (ref 0.86–1.14)
MAGNESIUM: 2.2 MG/DL (ref 1.8–2.4)
PERFORMED ON: ABNORMAL
PHOSPHORUS: 3.2 MG/DL (ref 2.5–4.9)
PHOSPHORUS: 3.5 MG/DL (ref 2.5–4.9)
POTASSIUM SERPL-SCNC: 3.1 MMOL/L (ref 3.5–5.1)
POTASSIUM SERPL-SCNC: 3.9 MMOL/L (ref 3.5–5.1)
PROTHROMBIN TIME: 16.7 SEC (ref 10–13.2)
SODIUM BLD-SCNC: 131 MMOL/L (ref 136–145)
SODIUM BLD-SCNC: 136 MMOL/L (ref 136–145)

## 2021-05-02 PROCEDURE — 99233 SBSQ HOSP IP/OBS HIGH 50: CPT | Performed by: NURSE PRACTITIONER

## 2021-05-02 PROCEDURE — 6360000002 HC RX W HCPCS: Performed by: INTERNAL MEDICINE

## 2021-05-02 PROCEDURE — 74018 RADEX ABDOMEN 1 VIEW: CPT

## 2021-05-02 PROCEDURE — 80069 RENAL FUNCTION PANEL: CPT

## 2021-05-02 PROCEDURE — 6370000000 HC RX 637 (ALT 250 FOR IP): Performed by: INTERNAL MEDICINE

## 2021-05-02 PROCEDURE — 6370000000 HC RX 637 (ALT 250 FOR IP): Performed by: NURSE PRACTITIONER

## 2021-05-02 PROCEDURE — 71045 X-RAY EXAM CHEST 1 VIEW: CPT

## 2021-05-02 PROCEDURE — 83735 ASSAY OF MAGNESIUM: CPT

## 2021-05-02 PROCEDURE — 2580000003 HC RX 258: Performed by: INTERNAL MEDICINE

## 2021-05-02 PROCEDURE — 85610 PROTHROMBIN TIME: CPT

## 2021-05-02 PROCEDURE — 6360000002 HC RX W HCPCS: Performed by: NURSE PRACTITIONER

## 2021-05-02 PROCEDURE — 2060000000 HC ICU INTERMEDIATE R&B

## 2021-05-02 RX ADMIN — OXYCODONE 5 MG: 5 TABLET ORAL at 22:29

## 2021-05-02 RX ADMIN — ASPIRIN 81 MG: 81 TABLET, CHEWABLE ORAL at 09:21

## 2021-05-02 RX ADMIN — Medication 1000 MG: at 17:12

## 2021-05-02 RX ADMIN — INSULIN LISPRO 2 UNITS: 100 INJECTION, SOLUTION INTRAVENOUS; SUBCUTANEOUS at 12:22

## 2021-05-02 RX ADMIN — MUPIROCIN: 20 OINTMENT TOPICAL at 21:39

## 2021-05-02 RX ADMIN — NAPHAZOLINE HYDROCHLORIDE AND PHENIRAMINE MALEATE 1 DROP: .25; 3 SOLUTION/ DROPS OPHTHALMIC at 21:39

## 2021-05-02 RX ADMIN — INSULIN LISPRO 4 UNITS: 100 INJECTION, SOLUTION INTRAVENOUS; SUBCUTANEOUS at 09:26

## 2021-05-02 RX ADMIN — METOPROLOL SUCCINATE 25 MG: 25 TABLET, EXTENDED RELEASE ORAL at 09:22

## 2021-05-02 RX ADMIN — POTASSIUM CHLORIDE 20 MEQ: 29.8 INJECTION, SOLUTION INTRAVENOUS at 06:26

## 2021-05-02 RX ADMIN — INSULIN LISPRO 2 UNITS: 100 INJECTION, SOLUTION INTRAVENOUS; SUBCUTANEOUS at 21:39

## 2021-05-02 RX ADMIN — Medication 10 ML: at 21:41

## 2021-05-02 RX ADMIN — FUROSEMIDE 5 MG/HR: 10 INJECTION, SOLUTION INTRAMUSCULAR; INTRAVENOUS at 05:13

## 2021-05-02 RX ADMIN — MONTELUKAST SODIUM 10 MG: 10 TABLET, FILM COATED ORAL at 21:39

## 2021-05-02 RX ADMIN — POTASSIUM CHLORIDE 40 MEQ: 1500 TABLET, EXTENDED RELEASE ORAL at 17:10

## 2021-05-02 RX ADMIN — OXYCODONE AND ACETAMINOPHEN 1 TABLET: 325; 10 TABLET ORAL at 17:11

## 2021-05-02 RX ADMIN — Medication 10 ML: at 09:25

## 2021-05-02 RX ADMIN — OXYCODONE AND ACETAMINOPHEN 1 TABLET: 325; 10 TABLET ORAL at 01:06

## 2021-05-02 RX ADMIN — INSULIN GLARGINE 10 UNITS: 100 INJECTION, SOLUTION SUBCUTANEOUS at 21:40

## 2021-05-02 RX ADMIN — SPIRONOLACTONE 12.5 MG: 25 TABLET ORAL at 09:22

## 2021-05-02 RX ADMIN — POTASSIUM CHLORIDE 40 MEQ: 1500 TABLET, EXTENDED RELEASE ORAL at 09:24

## 2021-05-02 RX ADMIN — AMIODARONE HYDROCHLORIDE 200 MG: 200 TABLET ORAL at 09:22

## 2021-05-02 RX ADMIN — NAPHAZOLINE HYDROCHLORIDE AND PHENIRAMINE MALEATE 1 DROP: .25; 3 SOLUTION/ DROPS OPHTHALMIC at 09:23

## 2021-05-02 RX ADMIN — MUPIROCIN: 20 OINTMENT TOPICAL at 09:24

## 2021-05-02 RX ADMIN — POTASSIUM CHLORIDE 20 MEQ: 29.8 INJECTION, SOLUTION INTRAVENOUS at 07:34

## 2021-05-02 RX ADMIN — POTASSIUM CHLORIDE 20 MEQ: 29.8 INJECTION, SOLUTION INTRAVENOUS at 00:52

## 2021-05-02 RX ADMIN — POTASSIUM CHLORIDE 20 MEQ: 29.8 INJECTION, SOLUTION INTRAVENOUS at 01:58

## 2021-05-02 RX ADMIN — PANTOPRAZOLE SODIUM 40 MG: 40 TABLET, DELAYED RELEASE ORAL at 05:16

## 2021-05-02 RX ADMIN — OXYCODONE AND ACETAMINOPHEN 1 TABLET: 325; 10 TABLET ORAL at 09:22

## 2021-05-02 RX ADMIN — MILRINONE LACTATE 0.2 MCG/KG/MIN: 200 INJECTION, SOLUTION INTRAVENOUS at 17:21

## 2021-05-02 RX ADMIN — INSULIN LISPRO 4 UNITS: 100 INJECTION, SOLUTION INTRAVENOUS; SUBCUTANEOUS at 17:17

## 2021-05-02 ASSESSMENT — PAIN DESCRIPTION - PAIN TYPE: TYPE: CHRONIC PAIN

## 2021-05-02 ASSESSMENT — PAIN DESCRIPTION - LOCATION: LOCATION: SHOULDER

## 2021-05-02 ASSESSMENT — PAIN SCALES - GENERAL
PAINLEVEL_OUTOF10: 8
PAINLEVEL_OUTOF10: 3
PAINLEVEL_OUTOF10: 7
PAINLEVEL_OUTOF10: 8

## 2021-05-02 ASSESSMENT — PAIN DESCRIPTION - ORIENTATION: ORIENTATION: RIGHT

## 2021-05-02 NOTE — PROGRESS NOTES
[facility] HOSPITALISTS PROGRESS NOTE    5/2/2021 7:58 AM        Name: Rayshawn Delatorre . Admitted: 4/15/2021  Primary Care Provider: Kelly Simms MD (Tel: [phone])                        Hospital course:   78 yo F with CAD s/p CABG, CKD 3, chronic systolic CHF, Afib, DM 2, obesity who was admitted with chest pain and back pain found to have A. fib with RVR along with acute on chronic systolic heart failure with worsening cardiomyopathy.  Has been diuresed with Milrinone gtt, Lasix gtt and Zaroxolyn.  Cardio and Renal following.  No plan for ICD at this time.  Cardio considering LHC vs JUDE to evaluate aortic valve once diuresed, hopefully 5/3/21. Subjective:  . No acute events overnight. Resting well. Pain control. Diet ok. Labs reviewed  Denies any chest pain sob.      Reviewed interval ancillary notes    Current Medications  mupirocin (BACTROBAN) 2 % ointment, BID  cefTRIAXone (ROCEPHIN) 1000 mg in sterile water 10 mL IV syringe, Q24H  insulin glargine (LANTUS;BASAGLAR) injection pen 10 Units, Nightly  lidocaine PF 1 % injection 5 mL, Once  0.9 % sodium chloride infusion, PRN  potassium chloride 20 mEq/50 mL IVPB (Central Line), PRN  warfarin (COUMADIN) daily dosing (placeholder), RX Placeholder  metOLazone (ZAROXOLYN) tablet 5 mg, Daily  amiodarone (CORDARONE) tablet 200 mg, Daily  naphazoline-pheniramine (NAPHCON-A) 0.025-0.3 % ophthalmic solution 1 drop, BID  bisacodyl (DULCOLAX) suppository 10 mg, Daily PRN  polyethylene glycol (GLYCOLAX) packet 17 g, BID  diphenhydrAMINE (BENADRYL) tablet 25 mg, Q6H PRN  oxyCODONE-acetaminophen (PERCOCET)  MG per tablet 1 tablet, Q6H PRN  lidocaine PF 1 % injection 5 mL, Once  sodium chloride flush 0.9 % injection 5-40 mL, PRN  0.9 % sodium chloride infusion, PRN  spironolactone (ALDACTONE) tablet 12.5 mg, Daily  oxyCODONE (ROXICODONE) immediate release tablet 5 mg, Q4H PRN  potassium chloride (KLOR-CON M) extended release tablet 40 mEq, BID WC  furosemide (LASIX) 100 mg in dextrose 5 % 100 mL infusion, Continuous  milrinone (PRIMACOR) 20 mg in dextrose 5 % 100 mL infusion, Continuous  metoprolol succinate (TOPROL XL) extended release tablet 25 mg, Daily  albuterol (PROVENTIL) nebulizer solution 2.5 mg, Q6H PRN  aspirin chewable tablet 81 mg, Daily  gabapentin (NEURONTIN) capsule 300 mg, Nightly  montelukast (SINGULAIR) tablet 10 mg, Nightly  pantoprazole (PROTONIX) tablet 40 mg, QAM AC  sodium chloride flush 0.9 % injection 5-40 mL, 2 times per day  0.9 % sodium chloride infusion, PRN  promethazine (PHENERGAN) tablet 12.5 mg, Q6H PRN    Or  ondansetron (ZOFRAN) injection 4 mg, Q6H PRN  polyethylene glycol (GLYCOLAX) packet 17 g, Daily PRN  acetaminophen (TYLENOL) tablet 650 mg, Q6H PRN    Or  acetaminophen (TYLENOL) suppository 650 mg, Q6H PRN  insulin lispro (1 Unit Dial) 0-12 Units, TID WC  insulin lispro (1 Unit Dial) 0-6 Units, Nightly  glucose (GLUTOSE) 40 % oral gel 15 g, PRN  dextrose 50 % IV solution, PRN  glucagon (rDNA) injection 1 mg, PRN  dextrose 5 % solution, PRN        Objective:  /71   Pulse 98   Temp 98.3 °F (36.8 °C) (Oral)   Resp 20   Ht 5' 8\" (1.727 m)   Wt 170 lb 10.2 oz (77.4 kg)   SpO2 92%   BMI 25.95 kg/m²     Intake/Output Summary (Last 24 hours) at 5/2/2021 0758  Last data filed at 5/2/2021 8779  Gross per 24 hour   Intake 1852.68 ml   Output 3600 ml   Net -1747.32 ml      Wt Readings from Last 3 Encounters:   05/02/21 170 lb 10.2 oz (77.4 kg)   04/13/21 205 lb (93 kg)   04/02/21 206 lb 9.6 oz (93.7 kg)       General appearance: Elderly  female, appears comfortable  Eyes: Sclera clear. Pupils equal.  ENT: Moist oral mucosa. Trachea midline, no adenopathy, neck supple. Cardiovascular: Regular rhythm, normal S1, S2. No murmur.  No edema in lower extremities  Respiratory: Not using accessory muscles. Good inspiratory effort. Clear to auscultation bilaterally, no wheeze or crackles. GI: Abdomen soft, no tenderness, not distended, normal bowel sounds  Musculoskeletal: No deformity, ROM WNL. Neurology: CN 2-12 grossly intact. No speech or motor deficits  Psych: Normal affect. Alert and oriented in time, place and person  Skin: Warm, dry, normal turgor    Labs and Tests:  CBC:   Recent Labs     04/30/21 0329 05/01/21  0520   WBC 5.2 6.0   HGB 10.3* 10.4*   HCT 32.3* 32.6*   MCV 83.3 82.7    247     BMP:    Recent Labs     04/30/21 0329 04/30/21 0329 05/01/21  0520 05/01/21 2020 05/02/21  0520     --  135*  --  136   K 3.3*   < > 3.0* 3.4* 3.1*   CL 94*  --  89*  --  89*   CO2 34*  --  35*  --  37*   BUN 22*  --  29*  --  34*   CREATININE 1.5*  --  1.8*  --  1.6*   GLUCOSE 168*  --  163*  --  119*    < > = values in this interval not displayed. Problem List  Principal Problem:    Acute on chronic systolic CHF (congestive heart failure) ([facility])  Active Problems:    Type 2 diabetes mellitus with hyperglycemia, with long-term current use of insulin ([facility])    PAF (paroxysmal atrial fibrillation) ([facility])    S/P CABG x 3    Obesity, Class I, BMI 30-34.9    Chronic systolic CHF (congestive heart failure), NYHA class 3 ([facility])    Persistent atrial fibrillation ([facility])    S/P MVR (mitral valve repair)    Stage 3 chronic kidney disease    Coronary artery disease involving native heart without angina pectoris    Atrial fibrillation with rapid ventricular response ([facility])    Chest pain    Dyspnea on exertion    Acute kidney injury superimposed on CKD ([facility] [address]    Hypoglycemia  Resolved Problems:    * No resolved hospital problems. *       Assessment & Plan:   1. Acute on chronic systolic heart failure: Continue with Lasix 15 mg/hr and milrinone drip, and 14.7 L negative balance since admission, patient remains fluid overloaded,Zaroxolyn continued,    JUDE or [address] Monday 5/3 per cardiology.   Chest x-ray pending from this morning.     2.Persistent atrial fibrillation: History of left atrial appendage clipping, failed cardioversion, holding Coumadin for ICD and continue amiodarone     3. TORIBIO on CKD: Creatinine 1.8--->1.6 today, adjustment of diuretic drip as per nephrology and cardiology     4. Diabetes mellitus: Morning blood sugar 200 mg/dL, continue with Lantus 10 U qhs and sliding scale insulin.      5. Hypokalemia: Continue replacement protocol.         Diet: DIET CARDIAC; Low Sodium (2 GM);  Daily Fluid Restriction: 2000 ml  Dietary Nutrition Supplements: Diabetic Oral Supplement  Code:Full Code  DVT PPX lovenox       Sadia Bowman MD   5/2/2021 7:58 AM

## 2021-05-02 NOTE — PROGRESS NOTES
[address]   Cardiology Progress Note   Date: 5/2/2021  Admit Date: 4/15/2021     Reason for follow up: Atrial fibrillation, CHF, AS    Chief Complaint:   Chief Complaint   Patient presents with    Chest Pain    Back Pain     History of Present Illness: History obtained from patient and medical record. Jamar Andrade is a 77 y.o. female with a past medical history of HTN, HLD, DM, CAD s/p CABG x 3, S/p bioprosthetic MVR, WAYNE clip (8/2018), afib s/p Maze 2018, CMP (EF25-30%) DVT/PE on warfarin and sCHF follows with HF. S/p ablation of afib w/ PVI, roofline, posterior, CTI flutter 10/30/2019. S/p ILR. Hx of Mobitz I AVB and prolonged ME interval. S/p JUDE/CV. Has been treated with amiodarone stopped in 2018. Presented with palpitations and tachycardia, found to be in afib/RVR and started on diltiazem gtt. Reportedly her carvedilol was reduced recently. She has been treated with IV milrinone and lasix gtt for prolonged period of time. metolazone was added with some effect. Dr. Blanca Castellanos has evaluated for AS and plans for JUDE on Monday. Interval Hx: Today, she is being seen for follow up. Remains on milrinone and lasix gtt. Renal fx remains stable today. Afib rates 's on telemetry. Lasix gtt decreased to 5 mg/hr yesterday. Down another 2 kg overnight. No new complaints today. No major events overnight. Denies having chest pain, palpitations, shortness of breath, orthopnea/PND, cough, or dizziness. Patient seen and examined. Clinical notes reviewed. Telemetry reviewed.      Problem List:   Patient Active Problem List    Diagnosis Date Noted    Acute on chronic systolic CHF (congestive heart failure) ([facility] 04/26/2021    Hypoglycemia 04/26/2021    Atrial fibrillation with rapid ventricular response (HCC)     Chest pain     Dyspnea on exertion     Acute kidney injury superimposed on CKD ([facility]     Hypotension     Acute on chronic systolic heart failure due to val[facility]ular disease [facility] 02/26/2021    Stage 3 chronic kidney disease     Coronary artery disease involving native heart without angina pectoris     Deep vein thrombosis (DVT) of non-extremity vein 12/15/2020    Aortic valve stenosis 11/03/2020    Pneumatosis intestinalis of small intestine 05/10/2020    Ischemic cardiomyopathy 01/20/2020    Occlusion and stenosis of bilateral carotid arteries 01/20/2020    Persistent atrial fibrillation ([address] 10/30/2019    S/P MVR (mitral valve repair)     Thoracic degenerative disc disease 06/07/2019    Chronic systolic CHF (congestive heart failure), NYHA class 3 ([facility] [address] 01/15/2019    Obesity, Class I, BMI 30-34.9     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    Goiter 09/07/2018    S/P CABG x 3 08/22/2018    Coronary artery disease due to lipid rich plaque     Nonrheumatic mitral valve regurgitation     Encounter for loop recorder check 08/16/2018    Cardiac arrhythmia     Pneumoperitoneum 07/28/2018    PAF (paroxysmal atrial fibrillation) ([facility]     Hypertension     Asthma 01/12/2018    Type 2 diabetes mellitus with hyperglycemia, with long-term current use of insulin ([facility] [facility] 04/27/2017    Primary osteoarthritis of both knees 03/21/2017    Multiple pulmonary nodules 08/01/2016    History of pulmonary embolism     B12 deficiency 09/08/2014    Paroxysmal SVT (supraventricular tachycardia) ([facility] 13/38/1688    Eosinophilic gastritis 40/86/2715    Generalized osteoarthrosis, involving multiple sites 03/25/2013    Long term current use of anticoagulant 12/19/2012    Hypercholesteremia 12/19/2012      Assessment and Plan:  Acute on Chronic systolic CHF/Cardiomyopathy   -  Appears mostly compensated, continues with BLE edema, SOB improved   - EF worsening 10-15% per echo this admission   - On milrinone and lasix gtt   - Weight down 10 kg, down 14 L   - AICD will be considered as OP   Persistent Atrial Fibrillation RVR   - Afib rates 's   - On Coreg and amiodarone  200 mg daily ~ TSH and LFT OK   - S/p WAYNE clip 8/2018, no AC needed from afib standpoint   - Failed cardioversion 4/20/2021  Aortic Insufficiency   - Plans for JUDE [place] to evaluate with Dr. Natalie Ospina     - Denies orthopnea  CAD   - Stable   - No reports of angina   - Continue medical therapy  HTN   - Controlled. Increase Toprol for rate control  PVCs   - likely secondary to hypokalemia   - IV replacement of K+ recommended    - Keep mag >2 and K >4  CKD   - Trending up   - Can switch to PO Torsemide, likely tomorrow post procedure     - NPO at midnight  - Tentative JUDE/LHC tomorrow with Dr. Natalie Ospina  - Discussed with Dr. Natalie Ospina and will stop lasix gtt to maintain renal fx for [facility] tomorrow. If she remains stable can likely switch to PO Torsemide after cath  - If worsening respiratory status overnight use bolus lasix    All pertinent information and plan of care discussed with the EP physician. Multiple medical conditions with risk of decompensation. Allergies: Allergies   Allergen Reactions    [place]-Omkx[place]qu-Degtnw [Fluocinolone] Shortness Of Breath    Ciprofloxacin Shortness Of Breath    Diovan [Valsartan] Shortness Of Breath    Flagyl [Metronidazole] Shortness Of Breath     Has taken diflucan at home 12/7/15    Metformin And Related [Metformin And Related] Shortness Of Breath    Benazepril      Other reaction(s): Not Recorded    Morphine      Bad reaction. \"makes her feel horrible\".  Saxagliptin      Other reaction(s): Not Recorded    Levaquin [Levofloxacin] Rash     Home Meds:  Prior to Visit Medications    Medication Sig Taking?  Authorizing Provider   spironolactone (ALDACTONE) 25 MG tablet Take 2 tablets by mouth daily Yes SOLEDAD Taylor - CNS   torsemide (DEMADEX) 20 MG tablet 40mg morning, 40mg afternoon, 20mg evening Yes Silvia Medel MD   carvedilol (COREG) 6.25 MG tablet Take 1 tablet by mouth daily Yes Silvia Medel MD   gabapentin (NEURONTIN) 300 MG capsule Take 1 capsule by mouth nightly for 90 days. Intended supply: 30 days Yes Sita Dill MD   insulin glargine (LANTUS SOLOSTAR) 100 UNIT/ML injection pen INJECT 26 UNITS IN THE MORNING AND 18 UNITS AT BEDTIME Yes Jaime Parrish MD   exenatide (BYETTA 10 MCG PEN) 10 MCG/0.04ML injection Inject 0.04 mLs into the skin 2 times daily (with meals) Yes Sita Dill MD   OXYCODONE HCL PO Take 10 mg by mouth As of 1/21/2021,  states patient is taking 10mg with edge being taken off her pain after 3 hours.  Yes Historical Provider, MD   ACETAMINOPHEN PO Take 500 mg by mouth every 6 hours as needed  Yes Historical Provider, MD   albuterol sulfate  (90 Base) MCG/ACT inhaler USE 2 INHALATIONS EVERY 6 HOURS AS NEEDED FOR WHEEZING Yes Sita Dill MD   albuterol (PROVENTIL) (2.5 MG/3ML) 0.083% nebulizer solution Take 3 mLs by nebulization every 6 hours as needed for Wheezing Yes Sita Dill MD   polyethylene glycol (GLYCOLAX) 17 GM/SCOOP powder Take 17 g by mouth every other day  Yes Historical Provider, MD   omeprazole (PRILOSEC) 20 MG delayed release capsule Take 20 mg by mouth daily Yes Historical Provider, MD   ondansetron (ZOFRAN) 4 MG tablet Take 1 tablet by mouth 3 times daily as needed for Nausea or Vomiting Yes Sita Dill MD   aspirin 81 MG chewable tablet Take 1 tablet by mouth daily Yes Sita Dill MD   vitamin B-12 500 MCG tablet Take 1 tablet by mouth daily Yes Brian Wilde MD   potassium chloride (KLOR-CON M) 10 MEQ extended release tablet TAKE 1 TABLET DAILY  Sita Dill MD   predniSONE (DELTASONE) 10 MG tablet TAKE 1 TABLET AS NEEDED (EOSINOPHILIC GASATRITIS)  Sita Dill MD   montelukast (SINGULAIR) 10 MG tablet TAKE 1 TABLET NIGHTLY  Sita Dill MD   warfarin (COUMADIN) 5 MG tablet TAKE 1 TABLET DAILY  Sita Dill MD   blood glucose test strips (FREESTYLE LITE) strip USE TO TEST FOUR TIMES A DAY  Sita Dill MD   FreeStyle Lancets MISC 1 each by Does not apply route 4 times daily  José Todd MD   Blood Glucose Monitoring Suppl (EMBRACE PRO GLUCOSE METER) GAETANO 1 Device by Does not apply route 4 times daily  José Todd MD   HUMALOG KWIKPEN 100 UNIT/ML SOPN TAKE AS INSTRUCTED BY YOUR PRESCRIBER  Patient taking differently:  Only if high blood sugar-has not been using  José Todd MD   nystatin (MYCOSTATIN) 267248 UNIT/ML suspension TAKE ONE TEASPOONFUL BY MOUTH FOUR TIMES A DAY FOR 5 DAYS  José Todd MD   BD PEN NEEDLE JANNET U/F 32G X 4 MM MISC USE 1 PEN NEEDLE FOUR TIMES A DAY  José Todd MD      Scheduled Meds:   mupirocin   Topical BID    cefTRIAXone (ROCEPHIN) IV  1,000 mg Intravenous Q24H    insulin glargine  10 Units Subcutaneous Nightly    lidocaine 1 % injection  5 mL Intradermal Once    warfarin (COUMADIN) daily dosing (placeholder)   Other RX Placeholder    metOLazone  5 mg Oral Daily    amiodarone  200 mg Oral Daily    naphazoline-pheniramine  1 drop Both Eyes BID    polyethylene glycol  17 g Oral BID    lidocaine 1 % injection  5 mL Intradermal Once    spironolactone  12.5 mg Oral Daily    potassium chloride  40 mEq Oral BID WC    metoprolol succinate  25 mg Oral Daily    aspirin  81 mg Oral Daily    gabapentin  300 mg Oral Nightly    montelukast  10 mg Oral Nightly    pantoprazole  40 mg Oral QAM AC    sodium chloride flush  5-40 mL Intravenous 2 times per day    insulin lispro  0-12 Units Subcutaneous TID WC    insulin lispro  0-6 Units Subcutaneous Nightly     Continuous Infusions:   sodium chloride 25 mL (04/30/21 1015)    sodium chloride      furosemide (LASIX) 1mg/ml infusion 5 mg/hr (05/02/21 0515)    milrinone 0.2 mcg/kg/min (05/02/21 0515)    sodium chloride      dextrose       PRN Meds:sodium chloride, potassium chloride, bisacodyl, diphenhydrAMINE, oxyCODONE-acetaminophen, sodium chloride flush, sodium chloride, oxyCODONE, albuterol, sodium chloride, promethazine **OR** ondansetron, polyethylene glycol, acetaminophen **OR** acetaminophen, glucose, dextrose, glucagon (rDNA), dextrose   Past Medical History:  Past Medical History:   Diagnosis Date    Asthma     Atrial fibrillation (HCC)     Eosinophilia     Hemoptysis     HIGH CHOLESTEROL     Hx of blood clots     Hypertension     Irregular heart beat     Other specified gastritis without mention of hemorrhage     Palpitations     Skin cancer     basal and squamous    Type II or unspecified type diabetes mellitus without mention of complication, not stated as uncontrolled       Past Surgical History:    has a past surgical history that includes Cholecystectomy;  section; Colonoscopy (2017); skin biopsy; bronchoscopy (2016); Coronary artery bypass graft (2018); Mitral valve replacement (2018); transesophageal echocardiogram (2018); Tunneled venous catheter placement (Left, 2018); Cardiac catheterization (2018); Insertable Cardiac Monitor (Left, 2018); Colonoscopy (2014); Hysterectomy; Upper gastrointestinal endoscopy (N/A, 10/10/2018); Colonoscopy (10/10/2018); Colonoscopy (N/A, 2020); Pain management procedure (N/A, 2020); and Pain management procedure (Bilateral, 2021). Social History:  Reviewed. reports that she has never smoked. She has never used smokeless tobacco. She reports that she does not drink alcohol or use drugs. Family History:  Reviewed. family history includes Asthma in her mother; Cancer in her father; Heart Disease in her mother; High Blood Pressure in her mother; Hypertension in her mother.    Denies family history of sudden cardiac death, arrhythmia, premature CAD    Review of Systems:  · Constitutional: Negative for fever + generalized weakness  · Skin: Negative for bruising, bleeding, + BLE redness  · HEENT: Negative for vision changes  · Respiratory: Reviewed in HPI  · Cardiovascular: Reviewed in HPI  · Gastrointestinal: Negative for abdominal pain, N/V/D, constipation, or black/tarry stools  · Genito-Urinary: Negative for hematuria  · Musculoskeletal: No focal weakness  · Neurological/Psych: Negative for confusion or TIA-like symptoms. No anxiety, depression, or insomnia    Physical Examination:  Vitals:    05/02/21 0529   BP: 112/71   Pulse: 98   Resp: 20   Temp: 98.3 °F (36.8 °C)   SpO2: 92%      In: 1784.8 [P.O.:1200; I.V.:334.8]  Out: 3600    Wt Readings from Last 3 Encounters:   04/30/21 192 lb 14.4 oz (87.5 kg)   04/13/21 205 lb (93 kg)   04/02/21 206 lb 9.6 oz (93.7 kg)       Intake/Output Summary (Last 24 hours) at 5/2/2021 4794  Last data filed at 5/2/2021 3543  Gross per 24 hour   Intake 1958.12 ml   Output 4050 ml   Net -2091.88 ml     Telemetry: Personally Reviewed Atrial fibrillation   · Constitutional: Cooperative and in no apparent distress, and appears well nourished  · Skin: Warm and pink; no cyanosis, bruising, or clubbing  · HEENT: Symmetric and normocephalic. Conjunctiva pink with clear sclera. Mucus membranes pink and moist.   · Cardiovascular: irregular and rhythm. S1 & S2. Peripheral pulses 2+, capillary refill < 3 seconds. negative elevation of JVP. + pitting BLE edema 1+, systolic murmur  · Respiratory: Respirations symmetric and unlabored. Lungs to auscultation bilaterally, no wheezing, or rhonchi + fine crackles RLL  · Gastrointestinal: Abdomen soft and round. Bowel sounds normoactive in all quadrants. · Musculoskeletal: No focal weakness. · Neurologic/Psych: Awake and orientated to person, place and time.  Calm affect, appropriate mood    Pertinent labs, diagnostic, device, and imaging results reviewed as a part of this visit    Labs:    BMP:   Recent Labs     04/30/21  0329 04/30/21  0329 05/01/21  0520 05/01/21 2020 05/02/21  0520     --  135*  --  136   K 3.3*   < > 3.0* 3.4* 3.1*   CL 94*  --  89*  --  89*   CO2 34*  --  35*  --  37*   PHOS 2.9  --  3.4  --  3.5   BUN 22*  --  29*  --  34*   CREATININE 1.5*  --  1.8*  --  1.6*   MG 1.70*  --  2.00  --  2.20    < > = values in this interval not displayed. Estimated Creatinine Clearance: 36 mL/min (A) (based on SCr of 1.6 mg/dL (H)). CBC:   Recent Labs     21  0329 21  0520   WBC 5.2 6.0   HGB 10.3* 10.4*   HCT 32.3* 32.6*   MCV 83.3 82.7    247     Thyroid:   Lab Results   Component Value Date    TSH 1.90 2021     Lipids:   Lab Results   Component Value Date    CHOL 149 2020    HDL 40 2020    TRIG 94 2021     LFTS:   Lab Results   Component Value Date    ALT 10 04/15/2021    AST 15 04/15/2021    ALKPHOS 215 04/15/2021    PROT 6.6 04/15/2021    AGRATIO 0.8 04/15/2021    BILITOT 0.6 04/15/2021     Cardiac Enzymes:   Lab Results   Component Value Date    TROPONINI 0.02 04/15/2021    TROPONINI 0.02 04/15/2021    TROPONINI <0.01 05/10/2020     Coags:   Lab Results   Component Value Date    PROTIME 16.7 2021    INR 1.43 2021     EC/15/2021: afib/RVR    ECHO:  2021  Summary   Overall left ventricular systolic function is severely depressed . Ejection fraction is visually estimated to be 10-15 %. E/e'= 23.2 GLS=-3.4   Moderately dilated left ventricle. There is severe diffuse hypokinesis. Indeterminate diastolic function. A bioprosthetic mitral valve is well seated with peak velocity of 1.59m/s   and a mean gradient of 3mmHg. The left atrium is moderately dilated. Mild aortic stenosis with a peak velocity of 2.5m/s and a mean pressure gradient of 14mmHg. The aortic valve is thickened/calcified with decreased leaflet mobility   consistent with aortic stenosis. Mild to moderate tricuspid regurgitation. Estimated pulmonary artery systolic pressure is mildly to moderately elevated at 46 mmHg assuming a right atrial pressure of 8 mmHg. 2020  Summary   Dobutamine echocardiogram for aortic stenosis.    Very technically limited exam due to atrial fibrillation. Baseline echocardiogram shows severe left ventricular dysfunction with   anterior, lateral and apical hypokinesis with an ejection fraction of 20-25   %. There is no improvement in wall motion with low or high dose dobutamine   suggestive of nonviable myocardium. Mild prosthetic aortic valve stenosis with a mean gradient of 16mmHg and   peak velocity of 2.47m/s at rest. After dobutamine stress the mean AV   gradient rises to 20mmHg with 20mcg of dobutamine consistent with mild to   moderate aortic stenosis. Prosthetic mitral valve with a mean gradient of 7mmHg  9/15/2020  Summary   Left ventricular cavity size is dilated. There is normal wall thickness with a moderately severe reduction in   systolic function. LV ejection fraction is visually estimated to be 25-30%. Indeterminate diastolic function. The right ventricle is not well visualized but appears to be normal in size   with moderately reduced function. The left atrium is moderately dilated. A bioprosthetic mitral valve with a mean gradient of 7 mmHg. This may be a   normal gradient for this valve but could suggest mild stenosis. Trivial mitral regurgitation. The aortic valve is thickened/calcified with a mean gradient of 16 mmHg   consistent with at least mild aortic stenosis. This is likely underestimated   due to low cardiac output secondary to LV dysfunction. No significant aortic valve regurgitation. Moderate tricuspid regurgitation with RVSP of 48 mmHg. Stress Test: 8/7/2018  Summary    Small sized lateral completely reversible defect of mild intensity    consistent with ischemia in the territory of the LCx and/or LAD .    Normal LV function.    Overall findings represent a intermediate risk scan.         All questions and concerns were addressed to the patient. Alternatives to my treatment were discussed. I have discussed the above stated plan with patient and the nurse.  The patient verbalized understanding and agreed with the plan. Thank you for allowing to us to participate in the care of Teddy Delatorre. KRYSTIAN Griggs  [address]   Office: [phone]    I have spent 35 minutes of face to face time with the patient with more than 50% spent counseling and coordinating care for Teddy Delatorre.

## 2021-05-02 NOTE — PROGRESS NOTES
Nephrology Attending  Progress Note        SUMMARY :  We are following this patient for TORIBIO on CKD stage 3   77 y.o. female who yesterday night started to have midsternal chest pain that was 7 out of 10 pressure-like associated with palpitations heart rate was up to 140s. Was short of breath no diaphoresis some nausea no vomiting. Patient came to the ED was in A. fib with RVR. Interval History (Chart/Data reviewed): Bun/Scr better; K low getting replaced. Diuretics held for procedure tomorrow. Remains on milrinone. KUB showing possible developing ileus/stool burden. ROS/  (+) Some itching LE cellulitis; better  (-) n/v/d/f/c/cp/sob. Updated at bedside: Patient, her , RN  Past medical, family, and social histories were reviewed as previously documented. Updates were made as necessary. Review of Systems ROS with pertinent positives and negatives listed in interval history.          Physical Exam:    VITALS:  /75   Pulse 106   Temp 97.4 °F (36.3 °C) (Oral)   Resp 20   Ht 5' 8\" (1.727 m)   Wt 170 lb 10.2 oz (77.4 kg)   SpO2 95%   BMI 25.95 kg/m²   BLOOD PRESSURE RANGE:  Systolic (67FMB), PNJ:654 , Min:107 , KXX:851   ; Diastolic (39SOU), CHP:91, Min:62, Max:76    24HR INTAKE/OUTPUT:      Intake/Output Summary (Last 24 hours) at 5/2/2021 1607  Last data filed at 5/2/2021 1221  Gross per 24 hour   Intake 2512.68 ml   Output 3250 ml   Net -737.32 ml   O > I       Gen:  alert, oriented x 3  PERRL , EOM +  Pallor +, No icterus  JVP not raised   CV: IRRR , Gr 3/6 systolic murmur heard all over precordium , especially at base of heart  Lungs:B/ L air entry, Normal breath sounds ,  No crackles   Abd: soft, bowel sounds + , No organomegaly   Ext: 1-2+ edema, no cyanosis, erythema + Shins  Neuro: nonfocal.      DATA:    CBC with Differential:    Lab Results   Component Value Date    WBC 6.0 05/01/2021    RBC 3.94 05/01/2021    HGB 10.4 05/01/2021    HCT 32.6 05/01/2021     05/01/2021 MCV 82.7 05/01/2021    MCH 26.3 05/01/2021    MCHC 31.8 05/01/2021    RDW 16.9 05/01/2021    SEGSPCT 64.1 09/02/2020    BANDSPCT 1 01/14/2019    LYMPHOPCT 13.3 05/01/2021    MONOPCT 14.2 05/01/2021    BASOPCT 1.1 05/01/2021    MONOSABS 0.9 05/01/2021    LYMPHSABS 0.8 05/01/2021    EOSABS 0.4 05/01/2021    BASOSABS 0.1 05/01/2021     CMP:    Lab Results   Component Value Date     05/02/2021    K 3.1 05/02/2021    K 3.5 04/21/2021    CL 89 05/02/2021    CO2 37 05/02/2021    BUN 34 05/02/2021    CREATININE 1.6 05/02/2021    GFRAA 38 05/02/2021    AGRATIO 0.8 04/15/2021    LABGLOM 31 05/02/2021    LABGLOM 45 12/06/2018    GLUCOSE 119 05/02/2021    PROT 6.6 04/15/2021    LABALBU 3.3 05/02/2021    CALCIUM 8.9 05/02/2021    BILITOT 0.6 04/15/2021    ALKPHOS 215 04/15/2021    AST 15 04/15/2021    ALT 10 04/15/2021     Magnesium:    Lab Results   Component Value Date    MG 2.20 05/02/2021     Phosphorus:    Lab Results   Component Value Date    PHOS 3.5 05/02/2021     Troponin:    Lab Results   Component Value Date    TROPONINI 0.02 04/15/2021     U/A:    Lab Results   Component Value Date    COLORU Yellow 04/15/2021    PROTEINU Negative 04/15/2021    PHUR 6.5 04/15/2021    WBCUA 3 04/15/2021    RBCUA 2 04/15/2021    YEAST neg 02/12/2021    BACTERIA trace 02/12/2021    BACTERIA 1+ 05/11/2020    CLARITYU Clear 04/15/2021    SPECGRAV 1.020 04/15/2021    LEUKOCYTESUR Negative 04/15/2021    UROBILINOGEN 0.2 04/15/2021    BILIRUBINUR Negative 04/15/2021    BLOODU TRACE-INTACT 04/15/2021    GLUCOSEU Negative 04/15/2021         IMPRESSION/RECOMMENDATIONS:      Diagnosis and Plan     1. TORIBIO   due to renal hypoperfusion secondary to hypotension as well as atrial fibrillation and diuretic use    2. Hypokalemia   Being Replaced po and IV , AM MG    3. CKD stage 3   Presumptive  diabetic nephropathy, however no proteinuria    Baseline  creatinine 1.1    4. HTN     5. Afib with RVR  Now controlled     6.  sCHF    acute on chronic exacerbation, recent EF 10 to 15%   Goal is to diurese her so that  she can lie flat to undergo a JUDE       Recommendations:   Agree with holding diuretics today  Renal panel in PM; aggressive K replacement KUB suggestive of developing ileus/moderate stool burden    Cellulitis LE; likely strep/folliculitis. Ceftriaxone / Bactroban        Thank you for allowing us to participate in the care of this patient. Please call with any questions.   Signed:  Charity Cobb M.D.   [facility]  Office Number: [phone]  Fax Number: [phone]  5/2/2021, 4:07 PM

## 2021-05-03 LAB
ALBUMIN SERPL-MCNC: 3.2 G/DL (ref 3.4–5)
ANION GAP SERPL CALCULATED.3IONS-SCNC: 9 MMOL/L (ref 3–16)
BUN BLDV-MCNC: 35 MG/DL (ref 7–20)
CALCIUM SERPL-MCNC: 9.1 MG/DL (ref 8.3–10.6)
CHLORIDE BLD-SCNC: 94 MMOL/L (ref 99–110)
CO2: 34 MMOL/L (ref 21–32)
CREAT SERPL-MCNC: 1.6 MG/DL (ref 0.6–1.2)
GFR AFRICAN AMERICAN: 38
GFR NON-AFRICAN AMERICAN: 31
GLUCOSE BLD-MCNC: 107 MG/DL (ref 70–99)
GLUCOSE BLD-MCNC: 134 MG/DL (ref 70–99)
GLUCOSE BLD-MCNC: 147 MG/DL (ref 70–99)
GLUCOSE BLD-MCNC: 150 MG/DL (ref 70–99)
GLUCOSE BLD-MCNC: 175 MG/DL (ref 70–99)
GLUCOSE BLD-MCNC: 269 MG/DL (ref 70–99)
INR BLD: 1.27 (ref 0.86–1.14)
MAGNESIUM: 2.3 MG/DL (ref 1.8–2.4)
PERFORMED ON: ABNORMAL
PHOSPHORUS: 3.6 MG/DL (ref 2.5–4.9)
POTASSIUM SERPL-SCNC: 3.7 MMOL/L (ref 3.5–5.1)
PROTHROMBIN TIME: 14.8 SEC (ref 10–13.2)
SARS-COV-2, NAAT: NOT DETECTED
SODIUM BLD-SCNC: 137 MMOL/L (ref 136–145)

## 2021-05-03 PROCEDURE — 6360000002 HC RX W HCPCS

## 2021-05-03 PROCEDURE — 6370000000 HC RX 637 (ALT 250 FOR IP): Performed by: INTERNAL MEDICINE

## 2021-05-03 PROCEDURE — 4A023N7 MEASUREMENT OF CARDIAC SAMPLING AND PRESSURE, LEFT HEART, PERCUTANEOUS APPROACH: ICD-10-PCS | Performed by: ANESTHESIOLOGY

## 2021-05-03 PROCEDURE — C1769 GUIDE WIRE: HCPCS

## 2021-05-03 PROCEDURE — 99233 SBSQ HOSP IP/OBS HIGH 50: CPT | Performed by: NURSE PRACTITIONER

## 2021-05-03 PROCEDURE — 83735 ASSAY OF MAGNESIUM: CPT

## 2021-05-03 PROCEDURE — 99152 MOD SED SAME PHYS/QHP 5/>YRS: CPT | Performed by: INTERNAL MEDICINE

## 2021-05-03 PROCEDURE — 2500000003 HC RX 250 WO HCPCS

## 2021-05-03 PROCEDURE — 6370000000 HC RX 637 (ALT 250 FOR IP): Performed by: NURSE PRACTITIONER

## 2021-05-03 PROCEDURE — 85610 PROTHROMBIN TIME: CPT

## 2021-05-03 PROCEDURE — 6360000002 HC RX W HCPCS: Performed by: INTERNAL MEDICINE

## 2021-05-03 PROCEDURE — 80069 RENAL FUNCTION PANEL: CPT

## 2021-05-03 PROCEDURE — 87635 SARS-COV-2 COVID-19 AMP PRB: CPT

## 2021-05-03 PROCEDURE — 93455 CORONARY ART/GRFT ANGIO S&I: CPT

## 2021-05-03 PROCEDURE — 93459 L HRT ART/GRFT ANGIO: CPT | Performed by: INTERNAL MEDICINE

## 2021-05-03 PROCEDURE — 1200000000 HC SEMI PRIVATE

## 2021-05-03 PROCEDURE — 99153 MOD SED SAME PHYS/QHP EA: CPT

## 2021-05-03 PROCEDURE — C1894 INTRO/SHEATH, NON-LASER: HCPCS

## 2021-05-03 PROCEDURE — 99152 MOD SED SAME PHYS/QHP 5/>YRS: CPT

## 2021-05-03 PROCEDURE — 2709999900 HC NON-CHARGEABLE SUPPLY

## 2021-05-03 PROCEDURE — 2580000003 HC RX 258: Performed by: INTERNAL MEDICINE

## 2021-05-03 PROCEDURE — 99233 SBSQ HOSP IP/OBS HIGH 50: CPT | Performed by: INTERNAL MEDICINE

## 2021-05-03 PROCEDURE — 6360000004 HC RX CONTRAST MEDICATION: Performed by: INTERNAL MEDICINE

## 2021-05-03 RX ORDER — LOSARTAN POTASSIUM 25 MG/1
25 TABLET ORAL DAILY
Status: DISCONTINUED | OUTPATIENT
Start: 2021-05-04 | End: 2021-05-05 | Stop reason: HOSPADM

## 2021-05-03 RX ORDER — CARVEDILOL 3.12 MG/1
3.12 TABLET ORAL 2 TIMES DAILY WITH MEALS
Status: DISCONTINUED | OUTPATIENT
Start: 2021-05-04 | End: 2021-05-04

## 2021-05-03 RX ORDER — SPIRONOLACTONE 25 MG/1
25 TABLET ORAL DAILY
Status: DISCONTINUED | OUTPATIENT
Start: 2021-05-04 | End: 2021-05-05

## 2021-05-03 RX ORDER — HEPARIN SODIUM 5000 [USP'U]/ML
5000 INJECTION, SOLUTION INTRAVENOUS; SUBCUTANEOUS EVERY 8 HOURS SCHEDULED
Status: DISCONTINUED | OUTPATIENT
Start: 2021-05-04 | End: 2021-05-04

## 2021-05-03 RX ADMIN — NAPHAZOLINE HYDROCHLORIDE AND PHENIRAMINE MALEATE 1 DROP: .25; 3 SOLUTION/ DROPS OPHTHALMIC at 22:16

## 2021-05-03 RX ADMIN — SPIRONOLACTONE 12.5 MG: 25 TABLET ORAL at 08:48

## 2021-05-03 RX ADMIN — OXYCODONE AND ACETAMINOPHEN 1 TABLET: 325; 10 TABLET ORAL at 07:35

## 2021-05-03 RX ADMIN — OXYCODONE AND ACETAMINOPHEN 1 TABLET: 325; 10 TABLET ORAL at 23:07

## 2021-05-03 RX ADMIN — INSULIN GLARGINE 10 UNITS: 100 INJECTION, SOLUTION SUBCUTANEOUS at 22:18

## 2021-05-03 RX ADMIN — MUPIROCIN: 20 OINTMENT TOPICAL at 22:17

## 2021-05-03 RX ADMIN — MILRINONE LACTATE 0.2 MCG/KG/MIN: 200 INJECTION, SOLUTION INTRAVENOUS at 10:18

## 2021-05-03 RX ADMIN — METOPROLOL SUCCINATE 25 MG: 25 TABLET, EXTENDED RELEASE ORAL at 08:48

## 2021-05-03 RX ADMIN — POTASSIUM CHLORIDE 40 MEQ: 1500 TABLET, EXTENDED RELEASE ORAL at 18:02

## 2021-05-03 RX ADMIN — Medication 10 ML: at 23:09

## 2021-05-03 RX ADMIN — IOPAMIDOL 58 ML: 755 INJECTION, SOLUTION INTRAVENOUS at 15:28

## 2021-05-03 RX ADMIN — NAPHAZOLINE HYDROCHLORIDE AND PHENIRAMINE MALEATE 1 DROP: .25; 3 SOLUTION/ DROPS OPHTHALMIC at 08:49

## 2021-05-03 RX ADMIN — ASPIRIN 81 MG: 81 TABLET, CHEWABLE ORAL at 08:47

## 2021-05-03 RX ADMIN — MUPIROCIN: 20 OINTMENT TOPICAL at 08:49

## 2021-05-03 RX ADMIN — AMIODARONE HYDROCHLORIDE 200 MG: 200 TABLET ORAL at 08:48

## 2021-05-03 RX ADMIN — Medication 1000 MG: at 18:02

## 2021-05-03 RX ADMIN — INSULIN LISPRO 1 UNITS: 100 INJECTION, SOLUTION INTRAVENOUS; SUBCUTANEOUS at 22:19

## 2021-05-03 RX ADMIN — MONTELUKAST SODIUM 10 MG: 10 TABLET, FILM COATED ORAL at 22:18

## 2021-05-03 RX ADMIN — INSULIN LISPRO 6 UNITS: 100 INJECTION, SOLUTION INTRAVENOUS; SUBCUTANEOUS at 17:27

## 2021-05-03 ASSESSMENT — PAIN SCALES - GENERAL
PAINLEVEL_OUTOF10: 8
PAINLEVEL_OUTOF10: 7
PAINLEVEL_OUTOF10: 2
PAINLEVEL_OUTOF10: 7

## 2021-05-03 ASSESSMENT — PAIN DESCRIPTION - ONSET: ONSET: ON-GOING

## 2021-05-03 ASSESSMENT — PAIN DESCRIPTION - ORIENTATION: ORIENTATION: UPPER

## 2021-05-03 ASSESSMENT — PAIN DESCRIPTION - LOCATION
LOCATION: BACK
LOCATION: BACK

## 2021-05-03 ASSESSMENT — PAIN DESCRIPTION - PROGRESSION
CLINICAL_PROGRESSION: NOT CHANGED
CLINICAL_PROGRESSION: NOT CHANGED

## 2021-05-03 ASSESSMENT — PAIN DESCRIPTION - FREQUENCY: FREQUENCY: CONTINUOUS

## 2021-05-03 NOTE — PROGRESS NOTES
Negative 04/15/2021    WBCUA 3 04/15/2021    BACTERIA trace 02/12/2021    BACTERIA 1+ 05/11/2020    RBCUA 2 04/15/2021    BLOODU TRACE-INTACT 04/15/2021    SPECGRAV 1.020 04/15/2021    GLUCOSEU Negative 04/15/2021       Radiology:  XR CHEST PORTABLE   Final Result   No acute process. Stable cardiomegaly         XR ABDOMEN (KUB) (SINGLE AP VIEW)   Final Result   Moderate stool burden in the colon      Findings suggest an ileus         XR CHEST PORTABLE   Final Result   No acute cardiopulmonary disease. Stable cardiomegaly with no evidence of   overt failure.              Assessment/Plan:    Active Hospital Problems    Diagnosis Date Noted    Acute on chronic systolic CHF (congestive heart failure) (HCC) [I50.23] 04/26/2021    Hypoglycemia [E16.2] 04/26/2021    Atrial fibrillation with rapid ventricular response (HCC) [I48.91]     Chest pain [R07.9]     Dyspnea on exertion [R06.00]     Acute kidney injury superimposed on CKD ([address] [N17.9, N18.9]     Stage 3 chronic kidney disease [N18.30]     Coronary artery disease involving native heart without angina pectoris [I25.10]     Persistent atrial fibrillation ([address] [I48.19] 10/30/2019    S/P MVR (mitral valve repair) [Z98.890]     Chronic systolic CHF (congestive heart failure), NYHA class 3 ([address] [I50.22] 01/15/2019    Obesity, Class I, BMI 30-34.9 [E66.9]     S/P CABG x 3 [Z95.1] 08/22/2018    PAF (paroxysmal atrial fibrillation) ([facility] [I48.0]     Type 2 diabetes mellitus with hyperglycemia, with long-term current use of insulin ([address] [E11.65, Z79.4] 04/27/2017       Acute Medical Issues Being Addressed:    29-year-old lady admitted to the hospital with dyspnea    Acute on chronic severe systolic heart failure  Last ejection fraction 10 to 15%  During the course of her stay she was diuresed with Lasix drip and started on milrinone  Has been 15 L negative since admission  Currently Lasix on hold only on milrinone  The plan is for JUDE and left heart catheter today  We will discuss with cardiology about starting oral Lasix tomorrow  AICD to be done as an outpatient per electrophysiology    Chronic atrial fibrillation  Atrial fibrillation is persistent  Has a history of left atrial appendage clipping  Failed cardioversion  Per electrophysiology continue amiodarone  Coumadin has been on hold for left heart cath once this is okay will resume Coumadin    CAD s/p CABG  Continue aspirin beta-blocker and statin    Aortic insufficiency  JUDE today    Hypertension  Blood pressure controlled    Acute kidney injury on chronic kidney disease  Creatinine remained stable at around 1.5  Nephrology have been consulted    Electrolyte abnormalities  We will replace and monitor levels      DVT Prophylaxis: sc heparin   Diet: Diet NPO, After Midnight Exceptions are: Sips of Water with Meds  Code Status: Full Code      Dispo - once acute medical processes have resolved    Rand Taveras MD

## 2021-05-03 NOTE — PROGRESS NOTES
[address]  HEART FAILURE  Progress Note      Admit Date 4/15/2021     Reason for Consult:      Reason for Consultation/Chief Complaint: chest pain    HPI:    Rayshawn Delatorre is a 77 y.o. female with PMH CAD, CABG/Maze 2018, AF, WAYNE clip 2018, s/p Ablation, ILR, HTN, HLD, DVT, PE, ICM, HFrEF admitted with tachycardia, was in AF with RVR, dilt gtt started and unsuccessful CVx2. Echo with very low EF 10-15% and she was scheduled for ICD but INR was elevated. She has been on lasix gtt and milrinone      Subjective:  Patient is being seen for CHF, ICM. Her LVEF has dropped to around 10-15%. She continues on IIV milrinone. Her lasix was stopped today. she has diuresed well. Fluid level is down. she also has valvular heart disease. There were no acute overnight cardiac events. She had a cardiac cath today. It did not show any significant CAD. Planning for JUDE tomorrow.         Baseline Weight: 198   Wt Readings from Last 3 Encounters:   05/03/21 197 lb 8 oz (89.6 kg)   04/13/21 205 lb (93 kg)   04/02/21 206 lb 9.6 oz (93.7 kg)          Objective:   BP (!) 147/83   Pulse 98   Temp 98.2 °F (36.8 °C) (Oral)   Resp 18   Ht 5' 8\" (1.727 m)   Wt 197 lb 8 oz (89.6 kg)   SpO2 94%   BMI 30.03 kg/m²       Intake/Output Summary (Last 24 hours) at 5/3/2021 1500  Last data filed at 5/3/2021 1130  Gross per 24 hour   Intake 185.28 ml   Output 1250 ml   Net -1064.72 ml      Wt Readings from Last 3 Encounters:   05/03/21 197 lb 8 oz (89.6 kg)   04/13/21 205 lb (93 kg)   04/02/21 206 lb 9.6 oz (93.7 kg)      In: 185.3 [I.V.:152.5]  Out: 1250       Physical Exam:  General Appearance:  Non-obese/Well Nourished, chronically ill appearing  Respiratory:  · Resp Auscultation: bilateral crackles  Cardiovascular:  · Auscultation: Regular rate and rhythm, normal S1S2, no m/g/r/c  · Palpation: Normal    · Pedal Pulses: 2+ and equal   Abdomen:   distended, firm,   Extremities:  · No Cyanosis or Clubbing  · Extremities: 1+ edema, erythema lle  Neurological/Psychiatric:  · Oriented to time, place, and person  · Non-anxious    MEDICATIONS:   Scheduled Meds:   Scheduled Meds:   [START ON 5/4/2021] heparin (porcine)  5,000 Units Subcutaneous 3 times per day    mupirocin   Topical BID    cefTRIAXone (ROCEPHIN) IV  1,000 mg Intravenous Q24H    insulin glargine  10 Units Subcutaneous Nightly    warfarin (COUMADIN) daily dosing (placeholder)   Other RX Placeholder    amiodarone  200 mg Oral Daily    naphazoline-pheniramine  1 drop Both Eyes BID    polyethylene glycol  17 g Oral BID    spironolactone  12.5 mg Oral Daily    potassium chloride  40 mEq Oral BID WC    metoprolol succinate  25 mg Oral Daily    aspirin  81 mg Oral Daily    gabapentin  300 mg Oral Nightly    montelukast  10 mg Oral Nightly    pantoprazole  40 mg Oral QAM AC    sodium chloride flush  5-40 mL Intravenous 2 times per day    insulin lispro  0-12 Units Subcutaneous TID WC    insulin lispro  0-6 Units Subcutaneous Nightly     Continuous Infusions:   sodium chloride 25 mL (04/30/21 1015)    milrinone 0.2 mcg/kg/min (05/03/21 1018)    dextrose       PRN Meds:.sodium chloride, potassium chloride, bisacodyl, diphenhydrAMINE, oxyCODONE-acetaminophen, sodium chloride flush, oxyCODONE, albuterol, promethazine **OR** ondansetron, polyethylene glycol, acetaminophen **OR** acetaminophen, glucose, dextrose, glucagon (rDNA), dextrose  Continuous Infusions:   sodium chloride 25 mL (04/30/21 1015)    milrinone 0.2 mcg/kg/min (05/03/21 1018)    dextrose         Intake/Output Summary (Last 24 hours) at 5/3/2021 1500  Last data filed at 5/3/2021 1130  Gross per 24 hour   Intake 185.28 ml   Output 1250 ml   Net -1064.72 ml       Lab Data:  CBC:   Lab Results   Component Value Date    WBC 6.0 05/01/2021    HGB 10.4 05/01/2021     05/01/2021     BMP:  Lab Results   Component Value Date     05/03/2021    K 3.7 05/03/2021    K 3.5 04/21/2021    CL 94 05/03/2021    CO2 34 05/03/2021    BUN 35 05/03/2021    CREATININE 1.6 05/03/2021    GLUCOSE 107 05/03/2021     INR:   Lab Results   Component Value Date    INR 1.27 05/03/2021    INR 1.43 05/02/2021    INR 1.64 05/01/2021        CARDIAC LABS  ENZYMES:No results for input(s): CKMB, CKMBINDEX, TROPONINI in the last 72 hours. Invalid input(s): CKTOTAL;3  FASTING LIPID PANEL:  Lab Results   Component Value Date    HDL 40 12/23/2020    LDLDIRECT 127 08/21/2018    LDLCALC 97 12/23/2020    TRIG 94 03/03/2021    TSH 1.90 04/01/2021     LIVER PROFILE:  Lab Results   Component Value Date    AST 15 04/15/2021    AST 16 04/15/2021    ALT 10 04/15/2021    ALT 11 04/15/2021     BNP:   Lab Results   Component Value Date    PROBNP 10,037 04/27/2021    PROBNP 6,712 04/22/2021    PROBNP 15,113 04/18/2021     Iron Studies:    Lab Results   Component Value Date    FERRITIN 799.9 10/05/2018    FERRITIN 416.5 08/30/2018     Lab Results   Component Value Date    IRON 49 11/30/2020    TIBC 310 11/30/2020    FERRITIN 799.9 (H) 10/05/2018         1. WEIGHT: Admit Weight: 203 lb 4 oz (92.2 kg)      Today  Weight: 197 lb 8 oz (89.6 kg)   2. I/O     Intake/Output Summary (Last 24 hours) at 5/3/2021 1500  Last data filed at 5/3/2021 1130  Gross per 24 hour   Intake 185.28 ml   Output 1250 ml   Net -1064.72 ml       Cardiac Testing:   Echo 4/20/21  Summary   Overall left ventricular systolic function is severely depressed .   Ejection fraction is visually estimated to be 10-15 %.  E/e'= 23.2 GLS=-3.4   Moderately dilated left ventricle.   There is severe diffuse hypokinesis.   Indeterminate diastolic function.   A bioprosthetic mitral valve is well seated with peak velocity of 1.59m/s   and a mean gradient of 3mmHg.   The left atrium is moderately dilated.   Mild aortic stenosis with a peak velocity of 2.5m/s and a mean pressure   gradient of 14mmHg.   The aortic valve is thickened/calcified with decreased leaflet mobility   consistent with aortic stenosis.   Mild to moderate tricuspid regurgitation.   Estimated pulmonary artery systolic pressure is mildly to moderately   elevated at 46 mmHg assuming a right atrial pressure of 8 mmHg     11/30/2020 Dobutamine Echo   Dobutamine echocardiogram for aortic stenosis.   Very technically limited exam due to atrial fibrillation.   Baseline echocardiogram shows severe left ventricular dysfunction with   anterior, lateral and apical hypokinesis with an ejection fraction of 20-25%.   There is no improvement in wall motion with low or high dose dobutamine   suggestive of nonviable myocardium.      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 After dobutamine stress the mean AV   gradient rises to 20mmHg with 20mcg of dobutamine consistent with mild to   moderate aortic stenosis.  Prosthetic mitral valve with a mean gradient of 7mmHg     Labs were reviewed including labs from other hospital systems through [facility]. Cardiac testing was reviewed including echos, nuclear scans, cardiac catheterization, including from other hospital systems through [facility]. Assessment/Plan:     1. AHF-lasix drip stopped. Continue milrinone another couple of days while diuresing. 1. Increase spironolactone 25 mg po qd and stop potassium (was on 50 mg daily at home)  2. Use metolazone when she goes home with torsemide  3. Start losartan 25 mg po qd tomorrow and stop milrinone  4. ICM-at this point, she is not a candidate for ICD because she is Class IV. We need to explore end of life decisions. 5. Valvular heart disease. Has evidence of aortic valve stenosis. Dr. Moran Stake to discuss further work up over the weekend. continue toprol and richard, no ACE/ARB for TORIBIO  6.  AF- on Amio, AC on hold for elevated INR    Increase spironolactone 25 mg po qd, increase further  Add losartan 25 mg po qd, stop milrinone  Restart coreg 3.125 mg po bid  Restart torsemide tomorrow  Use metolazone on discharge    I appreciate the opportunity of cooperating in the care of this individual.    Odin Andrews MD, [facility] [place]  5/3/2021, 3:00 PM  Heart Failure  The [address], [address]  Ph: [phone]      Core Measures:   · Discharge instructions:   · LVEF documented:   · ACEI for LV dysfunction:   · Smoking Cessation:

## 2021-05-03 NOTE — PROGRESS NOTES
[address]   Electrophysiology Progress Note     Date: 5/3/2021  Admit Date: 4/15/2021     Reason for consultation: Atrial fibrillation    Chief Complaint:   Chief Complaint   Patient presents with    Chest Pain    Back Pain       History of Present Illness: History obtained from patient and medical record. Dillon Ibrahim is a 77 y.o. female with a past medical history of HTN, HLD, DM, CAD s/p CABG x 3, S/p bioprosthetic MVR, WAYNE clip (8/2018), afib s/p Maze 2018, CMP (EF25-30%) DVT/PE on warfarin and sCHF follows with HF. S/p ablation of afib w/ PVI, roofline, posterior, CTI flutter 10/30/2019. S/p ILR. Hx of Mobitz I AVB and prolonged FL interval. S/p JUDE/CV. Has been treated with amiodarone stopped in 2018.      Presented with palpitations and tachycardia, found to be in afib/RVR and started on diltiazem gtt. Reportedly her carvedilol was reduced recently. Interval Hx: Today, she is being seen for follow up. She remains in atrial fibrillation, rate fairly well controlled. Her kidney function is stable. Pt is down net 16 L from admission. Plans for JUDE and LHC today with Dr. Nelida Holliday. Patient seen and examined. Clinical notes reviewed. Telemetry reviewed. No new complaints today. No major events overnight. Denies having chest pain, palpitations, shortness of breath, orthopnea/PND, cough, or dizziness at the time of this visit. Allergies: Allergies   Allergen Reactions    Wbihsuni-Dyvdpvf-Drhflr [Fluocinolone] Shortness Of Breath    Ciprofloxacin Shortness Of Breath    Diovan [Valsartan] Shortness Of Breath    Flagyl [Metronidazole] Shortness Of Breath     Has taken diflucan at home 12/7/15    Metformin And Related [Metformin And Related] Shortness Of Breath    Benazepril      Other reaction(s): Not Recorded    Morphine      Bad reaction. \"makes her feel horrible\".      Saxagliptin      Other reaction(s): Not Recorded    Levaquin [Levofloxacin] Rash     Home Meds:  Prior to Isis Alvarez MD   predniSONE (DELTASONE) 10 MG tablet TAKE 1 TABLET AS NEEDED (EOSINOPHILIC GASATRITIS)  Isis Alvarez MD   montelukast (SINGULAIR) 10 MG tablet TAKE 1 TABLET NIGHTLY  Isis Alvarez MD   warfarin (COUMADIN) 5 MG tablet TAKE 1 TABLET DAILY  Isis Alvarez MD   blood glucose test strips (FREESTYLE LITE) strip USE TO TEST FOUR TIMES A DAY  Isis Alvarez MD   FreeStyle Lancets MISC 1 each by Does not apply route 4 times daily  Isis Alvarez MD   Blood Glucose Monitoring Suppl (EMBRACE PRO GLUCOSE METER) GAETANO 1 Device by Does not apply route 4 times daily  Isis Alvarez MD   HUMALOG KWIKPEN 100 UNIT/ML SOPN TAKE AS INSTRUCTED BY YOUR PRESCRIBER  Patient taking differently:  Only if high blood sugar-has not been using  Isis Alvarez MD   nystatin (MYCOSTATIN) 501657 UNIT/ML suspension TAKE ONE TEASPOONFUL BY MOUTH FOUR TIMES A DAY FOR 5 DAYS  Isis Alvarez MD   BD PEN NEEDLE JANNET U/F 32G X 4 MM MISC USE 1 PEN NEEDLE FOUR TIMES A DAY  Isis Alvarez MD      Scheduled Meds:   mupirocin   Topical BID    cefTRIAXone (ROCEPHIN) IV  1,000 mg Intravenous Q24H    insulin glargine  10 Units Subcutaneous Nightly    lidocaine 1 % injection  5 mL Intradermal Once    warfarin (COUMADIN) daily dosing (placeholder)   Other RX Placeholder    amiodarone  200 mg Oral Daily    naphazoline-pheniramine  1 drop Both Eyes BID    polyethylene glycol  17 g Oral BID    lidocaine 1 % injection  5 mL Intradermal Once    spironolactone  12.5 mg Oral Daily    potassium chloride  40 mEq Oral BID WC    metoprolol succinate  25 mg Oral Daily    aspirin  81 mg Oral Daily    gabapentin  300 mg Oral Nightly    montelukast  10 mg Oral Nightly    pantoprazole  40 mg Oral QAM AC    sodium chloride flush  5-40 mL Intravenous 2 times per day    insulin lispro  0-12 Units Subcutaneous TID WC    insulin lispro  0-6 Units Subcutaneous Nightly     Continuous Infusions:   sodium mother. Review of Systems:  · Constitutional: Negative for fever, night sweats, chills, weight changes, or weakness  · Skin: Negative for rash, dry skin, pruritus, bruising, bleeding, blood clots, or changes in skin pigment  · HEENT: Negative for vision changes, ringing in the ears, sore throat, dysphagia, or swollen lymph nodes  · Respiratory: Positive for SOB/LEVIN  · Cardiovascular: Reviewed in HPI  · Gastrointestinal: Negative for abdominal pain, N/V/D, constipation, or black/tarry stools  · Genito-Urinary: Negative for dysuria, incontinence, urgency, or hematuria  · Musculoskeletal: Negative for joint swelling, muscle pain, or injuries  · Neurological/Psych: Negative for confusion, seizures, headaches, balance issues or TIA-like symptoms. No anxiety, depression, or insomnia    Physical Examination:  Vitals:    05/03/21 0837   BP: 128/72   Pulse: 99   Resp: 18   Temp: 98 °F (36.7 °C)   SpO2: 94%      In: 0   Out: 1050    Wt Readings from Last 3 Encounters:   05/02/21 170 lb 10.2 oz (77.4 kg)   04/13/21 205 lb (93 kg)   04/02/21 206 lb 9.6 oz (93.7 kg)       Intake/Output Summary (Last 24 hours) at 5/3/2021 0290  Last data filed at 5/3/2021 5800  Gross per 24 hour   Intake 660 ml   Output 1250 ml   Net -590 ml       Telemetry: Personally Reviewed  - Atrial fibrillation  · Constitutional: Cooperative and in no apparent distress, and appears well nourished  · Skin: Warm and pink; no pallor, cyanosis, bruising, or clubbing  · HEENT: Symmetric and normocephalic. PERRL, EOM intact. Conjunctiva pink with clear sclera. Mucus membranes pink and moist. Teeth intact. Thyroid smooth without nodules or goiter. · Cardiovascular: Regular rate and irregular rhythm. S1/S2 present without rubs, or gallops. + Murmur. Peripheral pulses 2+, capillary refill < 3 seconds. No elevation of JVP. + BLE edema (improved)  · Respiratory: Respirations symmetric and unlabored.  Lungs clear to auscultation bilaterally, no wheezing, crackles, or rhonchi  · Gastrointestinal: Abdomen soft and round. Bowel sounds normoactive in all quadrants without tenderness or masses. + obese  · Musculoskeletal: Bilateral upper and lower extremity strength 5/5 with full ROM  · Neurologic/Psych: Awake and orientated to person, place and time. Calm affect, appropriate mood    Pertinent labs, diagnostic, device, and imaging results reviewed as a part of this visit    Labs:    BMP:   Recent Labs     21   *  --  136 131* 137   K 3.0*   < > 3.1* 3.9 3.7   CL 89*  --  89* 90* 94*   CO2 35*  --  37* 33* 34*   PHOS 3.4  --  3.5 3.2 3.6   BUN 29*  --  34* 37* 35*   CREATININE 1.8*  --  1.6* 1.8* 1.6*   MG 2.00  --  2.20  --  2.30    < > = values in this interval not displayed. Estimated Creatinine Clearance: 34 mL/min (A) (based on SCr of 1.6 mg/dL (H)). CBC:   Recent Labs     21   WBC 6.0   HGB 10.4*   HCT 32.6*   MCV 82.7        Thyroid:   Lab Results   Component Value Date    TSH 1.90 2021     Lipids:   Lab Results   Component Value Date    CHOL 149 2020    HDL 40 2020    TRIG 94 2021     LFTS:   Lab Results   Component Value Date    ALT 10 04/15/2021    AST 15 04/15/2021    ALKPHOS 215 04/15/2021    PROT 6.6 04/15/2021    AGRATIO 0.8 04/15/2021    BILITOT 0.6 04/15/2021     Cardiac Enzymes:   Lab Results   Component Value Date    TROPONINI 0.02 04/15/2021    TROPONINI 0.02 04/15/2021    TROPONINI <0.01 05/10/2020     Coags:   Lab Results   Component Value Date    PROTIME 14.8 2021    INR 1.27 2021       EC/15/21  Atrial fibrillation    Echo: 21   Overall left ventricular systolic function is severely depressed . Ejection fraction is visually estimated to be 10-15 %. E/e'= 23.2 GLS=-3.4   Moderately dilated left ventricle. There is severe diffuse hypokinesis. Indeterminate diastolic function.    A bioprosthetic mitral valve is well seated with peak velocity of 1.59m/s and a mean gradient of 3mmHg. The left atrium is moderately dilated. Mild aortic stenosis with a peak velocity of 2.5m/s and a mean pressure gradient of 14mmHg. The aortic valve is thickened/calcified with decreased leaflet mobility consistent with aortic stenosis. Mild to moderate tricuspid regurgitation. Estimated pulmonary artery systolic pressure is mildly to moderately elevated at 46 mmHg assuming a right atrial pressure of 8 mmHg. ECHO: 10/20   Mildly dilated left ventricular size and normal wall thickness. Global left   ventricular function is severely decreased with ejection fraction estimated   at 25%. Patient is in atrial fibrillation during procedure. The bioprosthetic mitral valve is well seated with a mean gradient of 5 mmHg   and maximum pressure gradient of 15 mmHg with heart rate of 89 bpm. Pressure   half time of 82 ms. There is trivial mitral regurgitation. Left atrial enlargement. Spontaneous echo contrast seen in the left atrium. A large stump of the left atrial appendage with no thrombus noted. Patient   has history of surgical ligation of the left atrial appendage. There are   spontaneous echo contrast in the atrial appendage. The aortic valve is thickened/calcified with decreased leaflet mobility consistent with aortic stenosis. There is trivial aortic insufficiency. There is moderate tricuspid regurgitation    Stress Test: 8/18   Small sized lateral completely reversible defect of mild intensity    consistent with ischemia in the territory of the LCx and/or LAD .    Normal LV function.    Overall findings represent a intermediate risk scan.      [facility]: 8/2018  Heavily calcified vessels  Mild AS-Normal LV FXN  90% mid LAD  90% prox Diag 1  90% mid Cx  Mild Dominant RCA     REC: CVTS opinion--Heavy Ca, DM, Chronic AC Rx---CABG vs LAD/Diag stenting-    CXR: 5/2/21  No acute process.       Stable cardiomegaly     Problem List:   Patient Active Problem List    Diagnosis Date Noted    Acute on chronic systolic CHF (congestive heart failure) ([address] 04/26/2021    Hypoglycemia 04/26/2021    Atrial fibrillation with rapid ventricular response (HCC)     Chest pain     Dyspnea on exertion     Acute kidney injury superimposed on CKD ([address]     Hypotension     Acute on chronic systolic heart failure due to valvular disease ([address] 02/26/2021    Stage 3 chronic kidney disease     Coronary artery disease involving native heart without angina pectoris     Deep vein thrombosis (DVT) of non-extremity vein 12/15/2020    Aortic valve stenosis 11/03/2020    Pneumatosis intestinalis of small intestine 05/10/2020    Ischemic cardiomyopathy 01/20/2020    Occlusion and stenosis of bilateral carotid arteries 01/20/2020    Persistent atrial fibrillation ([address] 10/30/2019    S/P MVR (mitral valve repair)     Thoracic degenerative disc disease 06/07/2019    Chronic systolic CHF (congestive heart failure), NYHA class 3 ([address] 01/15/2019    Obesity, Class I, BMI 30-34.9     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 pulmonary nodules 08/01/2016    History of pulmonary embolism     B12 deficiency 09/08/2014    Paroxysmal SVT (supraventricular tachycardia) ([address] 89/08/5448    Eosinophilic gastritis 00/00/6860    Generalized osteoarthrosis, involving multiple sites 03/25/2013    Long term current use of anticoagulant 12/19/2012    Hypercholesteremia 12/19/2012 Assessment and Plan:     1. Persistent Atrial Fibrillation  - Hx of WAYNE clip  - S/p DCCV x2 yesterday (failed)     - Currently in AF, rate 90-100s  - Continue Toprol XL 25 mg QD   - Due to significant CAD and cardiomyopathy, anti-arrhythmic therapies are limited to amiodarone. I have discussed with patient side effects of amiodarone including but not limited to thyroid disease, discoloration of skin and cornea and pulmonary fibrosis. Patient would like to continue with it.     ~ Continue amiodarone 200 mg daily   ~ ALT 10, AST 15, TSH 1.67 (4/15/21)    - [id number] score:high ; [id number] Vasc score and anticoagulation discussed. High risk for stroke and thromboembolism. Anticoagulation is recommended. Risk of bleeding was discussed.  ~ INR 1.27. Coumadin on hold for [facility] today. Resume when ok with interventional cardiology      - Treatment options including cardioversion, rate control strategy, antiarrhythmics, anticoagulation and possible ablation were discussed with patient. Risks, benefits and alternative of each treatment options were explained. All questions answered    2. Acute on Chronic systolic heart failure (NYHA Class IV)  - Appears decompensated, but improving   ~ EF 10-15% per echo (11/20)  - Continue with Toprol XL 25 mg QD  ~ ACE-I/ARB contraindicated due to renal insufficiency and risk of hyperkalemia   - On primacor gtts   - Monitor I&Os, daily weights   - CHF team following    3. ICM   - Worsening; EF now down to 10-15% (Been on OMT prior to admission)   - Plan for [facility] today with Dr. Roney Marc     - Will plan for AICD implantation when she is optimized from CHF perspective (will need to be Class III or ambulatory Class IV)   - I have discussed the procedure, risks, benefits and alternatives in detail with patient and family. I gave printed material about AICD implantation, risks and benefits.  The risks including, but not limited to, the risks of bleeding, infection, pain, device malfunction, lead dislodgement, radiation exposure, injury to cardiac and surrounding structures (including pneumothorax), stroke, cardiac perforation, tamponade, need for emergent heart surgery, myocardial infarction and death were discussed in detail.      -----> Plan to consider in a few weeks after resolution of her acute issues. Will schedule follow up in office with Dr. Lisa Stewart to discuss    4. CAD  - Hx of CABG (2018)  - Stable  - No complaints of angina  - Continue ASA, BB, and statin    5. Aortic Insufficiency   - Plan for JUDE today to evaluate    6. HTN  - Controlled: Goal <130/80  - Continue current medications    7. TORIBIO on CKD   - Stable, but elevated    ~ Creatinin 1.6/BUN 35   - Monitor UO   - Nephrology following    8. Hypokalemia, Hypomagnesemia   - Stable   - Replace to keep K+ >4 and mag >2    Multiple medical conditions with risk of decompensation. EP will follow peripherally. Will schedule for follow up with Dr. Lisa Stewart in two weeks to discuss AICD placement as outpatient    All pertinent information and plan of care discussed with the EP physician. All questions and concerns were addressed to the patient. Alternatives to my treatment were discussed. I have discussed the above stated plan with patient and the nurse. The patient verbalized understanding and agreed with the plan. Thank you for allowing to us to participate in the care of Anthoyn Owusu     The patient was seen for >35 minutes. I reviewed interval history, physical exam, review of data including labs, imaging, development and implementation of treatment plan and coordination of complex care.     Van Gordillo, SOLEDAD-CNP  [facility]   Office: [phone]

## 2021-05-03 NOTE — PROGRESS NOTES
Nephrology Attending  Progress Note        SUMMARY :  We are following this patient for TORIBIO on CKD stage 3   77 y.o. female who yesterday night started to have midsternal chest pain that was 7 out of 10 pressure-like associated with palpitations heart rate was up to 140s. Was short of breath no diaphoresis some nausea no vomiting. Patient came to the ED was in A. fib with RVR.      Interval History (Chart/Data reviewed):   -pt seen and examined  -PMSHx and meds reviewed  -No family at bedside    Noted plans for JUDE and LHC today  No acute events ON  BP stable  Cr stable          ROS: neg chest pain/SOB/fever/chills         Physical Exam:    VITALS:  /79   Pulse 92   Temp 98.1 °F (36.7 °C) (Oral)   Resp 18   Ht 5' 8\" (1.727 m)   Wt 170 lb 10.2 oz (77.4 kg)   SpO2 93%   BMI 25.95 kg/m²   BLOOD PRESSURE RANGE:  Systolic (77PJW), IVC:593 , Min:114 , SHELDON:726   ; Diastolic (82CUU), MEET:96, Min:68, Max:81    24HR INTAKE/OUTPUT:      Intake/Output Summary (Last 24 hours) at 5/3/2021 0735  Last data filed at 5/2/2021 1221  Gross per 24 hour   Intake 660 ml   Output --   Net 660 ml   O > I       Gen:  alert, oriented x 3  Anicteric, NC/AT    JVP not raised   CV: IRRR , Gr 3/6 systolic murmur heard all over precordium , especially at base of heart  Lungs:B/ L air entry, Normal breath sounds ,  No crackles   Abd: soft, bowel sounds + , No organomegaly   Ext: trace to 1+ edema, no cyanosis, erythema + Shins  Neuro: nonfocal.      DATA:    CBC with Differential:    Lab Results   Component Value Date    WBC 6.0 05/01/2021    RBC 3.94 05/01/2021    HGB 10.4 05/01/2021    HCT 32.6 05/01/2021     05/01/2021    MCV 82.7 05/01/2021    MCH 26.3 05/01/2021    MCHC 31.8 05/01/2021    RDW 16.9 05/01/2021    SEGSPCT 64.1 09/02/2020    BANDSPCT 1 01/14/2019    LYMPHOPCT 13.3 05/01/2021    MONOPCT 14.2 05/01/2021    BASOPCT 1.1 05/01/2021    MONOSABS 0.9 05/01/2021    LYMPHSABS 0.8 05/01/2021    EOSABS 0.4 05/01/2021 BASOSABS 0.1 05/01/2021     CMP:    Lab Results   Component Value Date     05/02/2021    K 3.9 05/02/2021    K 3.5 04/21/2021    CL 90 05/02/2021    CO2 33 05/02/2021    BUN 37 05/02/2021    CREATININE 1.8 05/02/2021    GFRAA 33 05/02/2021    AGRATIO 0.8 04/15/2021    LABGLOM 27 05/02/2021    LABGLOM 45 12/06/2018    GLUCOSE 247 05/02/2021    PROT 6.6 04/15/2021    LABALBU 3.1 05/02/2021    CALCIUM 8.8 05/02/2021    BILITOT 0.6 04/15/2021    ALKPHOS 215 04/15/2021    AST 15 04/15/2021    ALT 10 04/15/2021     Magnesium:    Lab Results   Component Value Date    MG 2.30 05/03/2021     Phosphorus:    Lab Results   Component Value Date    PHOS 3.2 05/02/2021     Troponin:    Lab Results   Component Value Date    TROPONINI 0.02 04/15/2021     U/A:    Lab Results   Component Value Date    COLORU Yellow 04/15/2021    PROTEINU Negative 04/15/2021    PHUR 6.5 04/15/2021    WBCUA 3 04/15/2021    RBCUA 2 04/15/2021    YEAST neg 02/12/2021    BACTERIA trace 02/12/2021    BACTERIA 1+ 05/11/2020    CLARITYU Clear 04/15/2021    SPECGRAV 1.020 04/15/2021    LEUKOCYTESUR Negative 04/15/2021    UROBILINOGEN 0.2 04/15/2021    BILIRUBINUR Negative 04/15/2021    BLOODU TRACE-INTACT 04/15/2021    GLUCOSEU Negative 04/15/2021         IMPRESSION/RECOMMENDATIONS:      Diagnosis and Plan     TORIBIO: baseline SCr is 1.2-1.6 range  -peak SCr 2.2-due to renal hypoperfusion secondary to hypotension as well as atrial fibrillation and diuretic use  -SCr is improved  -monitor post LHC  -holding lasix for cath, avoiding IVF due to severe CM  -limit controast    Hypokalemia   -replaced, improved, Mg is OK    CKD stage 3b:  Presumptive  diabetic nephropathy, however no proteinuria    Baseline  creatinine variable, recurrent TORIBIO on CKD  -sees Dr. Khadijah Zarco    HTN: stable    A. fib with RVR: per EP  Now controlled     HFrEF: acute on chronic exacerbation, recent EF 10 to 15%  -holding lasix, volume status is improved    AR: plan for JUDE today      Cellulitis LE; likely strep/folliculitis. Ceftriaxone / Bactroban        Thank you for allowing us to participate in the care of this patient. Please call with any questions.   Signed:  Lorraine Garcia M.D.   [facility]  Office Number: [phone]  Fax Number: [phone]  5/3/2021, 7:35 AM

## 2021-05-03 NOTE — PRE SEDATION
mellitus without mention of complication, not stated as uncontrolled      Surgical History:   Past Surgical History:   Procedure Laterality Date    BRONCHOSCOPY  07/18/2016    Dr. Jaskaran Villalba - brushings from [address] [place] [address],[id number]  08/16/2018    Dr. Choudhury Enter  02/07/2017    Dr. Heber Aguero - sigmoid diverticulosis, polypectomies x3    COLONOSCOPY  01/17/2014    Dr. Heber Aguero - sigmoid diverticulosis, polypectomies x3, internal hemorrhoids    COLONOSCOPY  10/10/2018    w/biopsy performed by Eduardo Ortiz MD at [place] [address] N/A 8/7/2020    COLONOSCOPY DIAGNOSTIC performed by Jaskaran Villalba MD at [address]  08/21/2018    Dr. Gia Cotter - x3 (LIMA-LAD, L SV-D1-PLV) modified BL MAZE procedure w/obliteration of WAYNE using 45mm AtriClip    HYSTERECTOMY      INSERTABLE CARDIAC MONITOR Left 08/16/2018    Dr. Mykel Wright  [facility] SN# [id number] [employer]    MITRAL VALVE REPLACEMENT  08/21/2018    Dr. Gia Cotter - 27mm [employer] Cinch tissue valve    PAIN MANAGEMENT PROCEDURE N/A 12/18/2020    C6-C7 MIDLINE  EPIDURAL STEROID INJECTION WITH FLUOROSCOPY performed by Dalila Emanuel MD at [facility] Bilateral 1/18/2021    BILATERAL T11 TRANSFORAMINAL EPIDURAL STEROID INJECTION WITH FLUOROSCOPY performed by Dalila Emanuel MD at [address] TRANSESOPHAGEAL ECHOCARDIOGRAM  08/21/2018    during CABG/MVR    TUNNELED VENOUS CATHETER PLACEMENT Left 08/23/2018    Dr. Charity Bailey -  for HD---since removed    UPPER GASTROINTESTINAL ENDOSCOPY N/A 10/10/2018    w/biopsy performed by Eduardo Ortiz MD at [address] [place] [address]     Medications:    Current Facility-Administered Medications   Medication Dose Route Frequency Provider Last Rate Last Admin    mupirocin (BACTROBAN) 2 % ointment   Topical BID Justen Hill MD   Given at 05/03/21 0849    cefTRIAXone (ROCEPHIN) 1000 mg in sterile water 10 mL IV syringe  1,000 mg Intravenous Q24H Candace Martinez MD   1,000 mg at 05/02/21 1712    insulin glargine (LANTUS;BASAGLAR) injection pen 10 Units  10 Units Subcutaneous Nightly Tess Garcia MD   10 Units at 05/02/21 2140    0.9 % sodium chloride infusion  25 mL Intravenous PRN Tess Garcia MD 25 mL/hr at 04/30/21 1015 25 mL at 04/30/21 1015    potassium chloride 20 mEq/50 mL IVPB (Central Line)  20 mEq Intravenous PRN SOLEDAD Mustafa - CNP   Stopped at 05/02/21 0809    warfarin (COUMADIN) daily dosing (placeholder)   Other RX Placeholder Tess Garcia MD        amiodarone (CORDARONE) tablet 200 mg  200 mg Oral Daily SOLEDAD Mustafa - CNP   200 mg at 05/03/21 0848    naphazoline-pheniramine (NAPHCON-A) 0.025-0.3 % ophthalmic solution 1 drop  1 drop Both Eyes BID Tess Garcia MD   1 drop at 05/03/21 0849    bisacodyl (DULCOLAX) suppository 10 mg  10 mg Rectal Daily PRN Tess Garcia MD   10 mg at 04/27/21 1429    polyethylene glycol (GLYCOLAX) packet 17 g  17 g Oral BID Tess Garcia MD   Stopped at 05/01/21 2104    diphenhydrAMINE (BENADRYL) tablet 25 mg  25 mg Oral Q6H PRN Mike Hampton PA-C   25 mg at 04/27/21 0843    oxyCODONE-acetaminophen (PERCOCET)  MG per tablet 1 tablet  1 tablet Oral Q6H PRN Sohan Rios MD   1 tablet at 05/03/21 0735    sodium chloride flush 0.9 % injection 5-40 mL  5-40 mL Intravenous PRN Sohan Rios MD   40 mL at 05/01/21 1813    spironolactone (ALDACTONE) tablet 12.5 mg  12.5 mg Oral Daily Mynor KELSI Sun MD   12.5 mg at 05/03/21 0848    oxyCODONE (ROXICODONE) immediate release tablet 5 mg  5 mg Oral Q4H PRN Marie Mendoza MD   5 mg at 05/02/21 2229    potassium chloride (KLOR-CON M) extended release tablet 40 mEq  40 mEq Oral BID  Mynor Sun MD   40 mEq at 05/02/21 1710    milrinone (PRIMACOR) 20 mg in dextrose 5 % 100 mL infusion  0.2 mcg/kg/min Intravenous Continuous Bev Orosco DO 5.7 mL/hr at 05/03/21 1018 0.2 mcg/kg/min at 05/03/21 1018    metoprolol succinate (TOPROL XL) extended release tablet 25 mg  25 mg Oral Daily Mauri Navas, APRN - CNP   25 mg at 05/03/21 0848    albuterol (PROVENTIL) nebulizer solution 2.5 mg  2.5 mg Nebulization Q6H PRN Reji Strickland MD   2.5 mg at 04/25/21 2228    aspirin chewable tablet 81 mg  81 mg Oral Daily Reji Strickland MD   81 mg at 05/03/21 0847    gabapentin (NEURONTIN) capsule 300 mg  300 mg Oral Nightly Reji Strickland MD   Stopped at 04/22/21 2135    montelukast (SINGULAIR) tablet 10 mg  10 mg Oral Nightly Reji Strickland MD   10 mg at 05/02/21 2139    pantoprazole (PROTONIX) tablet 40 mg  40 mg Oral QAM AC Reji Strickland MD   Stopped at 05/03/21 0502    sodium chloride flush 0.9 % injection 5-40 mL  5-40 mL Intravenous 2 times per day Reji Strickland MD   10 mL at 05/02/21 2141    promethazine (PHENERGAN) tablet 12.5 mg  12.5 mg Oral Q6H PRN Reji Strickland MD   12.5 mg at 05/01/21 1934    Or    ondansetron (ZOFRAN) injection 4 mg  4 mg Intravenous Q6H PRN Reji Strickland MD   4 mg at 05/01/21 1811    polyethylene glycol (GLYCOLAX) packet 17 g  17 g Oral Daily PRN Reji Strickland MD   17 g at 04/26/21 2146    acetaminophen (TYLENOL) tablet 650 mg  650 mg Oral Q6H PRN Reji Strickland MD   650 mg at 04/22/21 2318    Or    acetaminophen (TYLENOL) suppository 650 mg  650 mg Rectal Q6H PRN Reji Strickland MD        insulin lispro (1 Unit Dial) 0-12 Units  0-12 Units Subcutaneous TID WC Reji Strickland MD   4 Units at 05/02/21 1717    insulin lispro (1 Unit Dial) 0-6 Units  0-6 Units Subcutaneous Nightly Reji Strickland MD   2 Units at 05/02/21 2139    glucose (GLUTOSE) 40 % oral gel 15 g  15 g Oral PRN Reji Strickland MD        dextrose 50 % IV solution  12.5 g Intravenous PRN Reji Strickland MD        glucagon (rDNA) injection 1 mg  1 mg Intramuscular PRN Reji Strickland MD        dextrose 5 % solution  100 mL/hr Intravenous PRN Yun Roman MD         Anti-Anginal(2wks): ANTI-ANGINAL MEDS: Yes: Beta Blockers and Aspirin    Pre-Sedation Assessment     Vital Signs:  BP (!) 147/83   Pulse 98   Temp 98.2 °F (36.8 °C) (Oral)   Resp 18   Ht 5' 8\" (1.727 m)   Wt 197 lb 8 oz (89.6 kg)   SpO2 94%   BMI 30.03 kg/m²   Pre-Sedation Exam: I have assessed the patient and agree with the H&P present on the chart. Hx Anesthesia Comps: None  Modified Mallampati: II (soft palate, uvula, fauces visible)  ASA Classification: Class 2 - A normal healthy patient with mild systemic disease and Class 2 -- A normal healthy patient with mild systemic disease  Edward Scale: Activity:   2 - Able to move 4 extremities voluntarily on command  Respiration:   2 - Able to breathe deeply and cough freely  Circulation:   2 - BP+/- 20mmHg of normal  Consciousness:   2 - Fully awake  Oxygen Sat: 2 - Able to maintain oxygen saturation >92% on room air    Sedation Plan     Goals:  Guard safety/welfare, minimize discomfort/pain/negative psychological responses to treatment by providing sedation/analgesia, maximize potential amnesia. Patient to meet pre-procedure discharge plan.   Meds Planned: IV fentanyl and/or versed  Sedation Cand: Patient is an appropriate candidate for plan of sedation: Yes    Electronic Signature     Damian Flanagan MD on 5/3/2021 at 2:18 PM

## 2021-05-03 NOTE — PLAN OF CARE
Pt A/Ox4 throughout night, primacor gtt infusing, no needs at the time. NPO since midnight for JUDE/LHC. Vitals are stable, fall precautions in place, no needs at this time.    Vitals:    05/03/21 0524   BP: 117/79   Pulse: 92   Resp: 18   Temp: 98.1 °F (36.7 °C)   SpO2: 93%       Problem: Pain:  Goal: Control of acute pain  Description: Control of acute pain  Outcome: Ongoing     Problem: Cardiovascular  Goal: No DVT, peripheral vascular complications  Outcome: Ongoing  Goal: Hemodynamic stability  Outcome: Ongoing  Goal: Anticoagulate/Hct stable  Outcome: Ongoing  Goal: Agreement to quit smoking  Outcome: Ongoing  Goal: Weight maintained or lost  Outcome: Ongoing  Goal: Understanding of dietary restrictions  Outcome: Ongoing     Problem: Respiratory  Goal: No pulmonary complications  Outcome: Ongoing  Goal: O2 Sat > 90%  Outcome: Ongoing  Goal: Supplemental O2 requirements decreased  Outcome: Ongoing  Goal: Pneumonia vaccine at discharge as needed  Outcome: Ongoing     Problem: Serum Glucose Level - Abnormal:  Goal: Ability to maintain appropriate glucose levels has stabilized  Description: Ability to maintain appropriate glucose levels has stabilized  Outcome: Ongoing     Problem: Musculor/Skeletal Functional Status  Goal: Highest potential functional level  Outcome: Ongoing  Goal: Absence of falls  Outcome: Ongoing

## 2021-05-03 NOTE — OP NOTE
[address]  Procedure Note    Procedure: [facility] with grafts  Indication: Reducd EF 10%, sob, cp. Procedure Details:  Consent Access Bleed R Sedat Start Stop Versed Fentanyl Contrast Fluoro EBL Comp Spec   Yes RCFA high MCS[facility] 1431 1503 2 100 58 12.8 <20 None None   *Sedation Note: [facility]CS[facility] = minimal conscious sedation for comfort    Findings:  Artery Findings/Result   LM Normal   LAD 50% mid, 70% mid, competitive flow distal from LIMA   Cx OM1 20% ostial to prox, superior branch mid OM1 50% ostial   RI N/A   S-D-RPL patent   L-LAD patent   RCA 50-60% distal, very heavily calcified   LVEDP 15   AV Peak to peak gradient 36mmHg, valve crossed easily with pigtail.    LVG NA     Intervention:   None    Post Cath Dx:   Patent grafts  JUDE tomorrow to evaluate aortic valve although does not appear severe by cath or TTE

## 2021-05-04 LAB
ALBUMIN SERPL-MCNC: 3.2 G/DL (ref 3.4–5)
ANION GAP SERPL CALCULATED.3IONS-SCNC: 9 MMOL/L (ref 3–16)
BUN BLDV-MCNC: 29 MG/DL (ref 7–20)
CALCIUM SERPL-MCNC: 9.2 MG/DL (ref 8.3–10.6)
CHLORIDE BLD-SCNC: 97 MMOL/L (ref 99–110)
CO2: 33 MMOL/L (ref 21–32)
CREAT SERPL-MCNC: 1.6 MG/DL (ref 0.6–1.2)
EKG ATRIAL RATE: 76 BPM
EKG DIAGNOSIS: NORMAL
EKG P-R INTERVAL: 240 MS
EKG Q-T INTERVAL: 490 MS
EKG QRS DURATION: 92 MS
EKG QTC CALCULATION (BAZETT): 551 MS
EKG R AXIS: 61 DEGREES
EKG T AXIS: 82 DEGREES
EKG VENTRICULAR RATE: 76 BPM
GFR AFRICAN AMERICAN: 38
GFR NON-AFRICAN AMERICAN: 31
GLUCOSE BLD-MCNC: 103 MG/DL (ref 70–99)
GLUCOSE BLD-MCNC: 128 MG/DL (ref 70–99)
GLUCOSE BLD-MCNC: 147 MG/DL (ref 70–99)
GLUCOSE BLD-MCNC: 196 MG/DL (ref 70–99)
GLUCOSE BLD-MCNC: 88 MG/DL (ref 70–99)
INR BLD: 1.14 (ref 0.86–1.14)
MAGNESIUM: 2.5 MG/DL (ref 1.8–2.4)
PERFORMED ON: ABNORMAL
PERFORMED ON: NORMAL
PHOSPHORUS: 3.7 MG/DL (ref 2.5–4.9)
POTASSIUM SERPL-SCNC: 4 MMOL/L (ref 3.5–5.1)
PROTHROMBIN TIME: 13.3 SEC (ref 10–13.2)
SODIUM BLD-SCNC: 139 MMOL/L (ref 136–145)

## 2021-05-04 PROCEDURE — 92960 CARDIOVERSION ELECTRIC EXT: CPT

## 2021-05-04 PROCEDURE — 83735 ASSAY OF MAGNESIUM: CPT

## 2021-05-04 PROCEDURE — 85610 PROTHROMBIN TIME: CPT

## 2021-05-04 PROCEDURE — 94760 N-INVAS EAR/PLS OXIMETRY 1: CPT

## 2021-05-04 PROCEDURE — 1200000000 HC SEMI PRIVATE

## 2021-05-04 PROCEDURE — 80069 RENAL FUNCTION PANEL: CPT

## 2021-05-04 PROCEDURE — 7100000010 HC PHASE II RECOVERY - FIRST 15 MIN

## 2021-05-04 PROCEDURE — 93320 DOPPLER ECHO COMPLETE: CPT

## 2021-05-04 PROCEDURE — 6370000000 HC RX 637 (ALT 250 FOR IP): Performed by: INTERNAL MEDICINE

## 2021-05-04 PROCEDURE — 6370000000 HC RX 637 (ALT 250 FOR IP): Performed by: NURSE PRACTITIONER

## 2021-05-04 PROCEDURE — B246ZZ4 ULTRASONOGRAPHY OF RIGHT AND LEFT HEART, TRANSESOPHAGEAL: ICD-10-PCS | Performed by: ANESTHESIOLOGY

## 2021-05-04 PROCEDURE — 2580000003 HC RX 258: Performed by: INTERNAL MEDICINE

## 2021-05-04 PROCEDURE — 93325 DOPPLER ECHO COLOR FLOW MAPG: CPT

## 2021-05-04 PROCEDURE — 6360000002 HC RX W HCPCS: Performed by: INTERNAL MEDICINE

## 2021-05-04 PROCEDURE — 99233 SBSQ HOSP IP/OBS HIGH 50: CPT | Performed by: NURSE PRACTITIONER

## 2021-05-04 PROCEDURE — 92960 CARDIOVERSION ELECTRIC EXT: CPT | Performed by: INTERNAL MEDICINE

## 2021-05-04 PROCEDURE — 93005 ELECTROCARDIOGRAM TRACING: CPT | Performed by: INTERNAL MEDICINE

## 2021-05-04 PROCEDURE — 6360000002 HC RX W HCPCS: Performed by: NURSE PRACTITIONER

## 2021-05-04 PROCEDURE — 6370000000 HC RX 637 (ALT 250 FOR IP): Performed by: PHYSICIAN ASSISTANT

## 2021-05-04 PROCEDURE — 93312 ECHO TRANSESOPHAGEAL: CPT

## 2021-05-04 PROCEDURE — 36592 COLLECT BLOOD FROM PICC: CPT

## 2021-05-04 PROCEDURE — 99152 MOD SED SAME PHYS/QHP 5/>YRS: CPT | Performed by: INTERNAL MEDICINE

## 2021-05-04 PROCEDURE — 99152 MOD SED SAME PHYS/QHP 5/>YRS: CPT

## 2021-05-04 PROCEDURE — 93010 ELECTROCARDIOGRAM REPORT: CPT | Performed by: INTERNAL MEDICINE

## 2021-05-04 PROCEDURE — 5A2204Z RESTORATION OF CARDIAC RHYTHM, SINGLE: ICD-10-PCS | Performed by: INTERNAL MEDICINE

## 2021-05-04 RX ORDER — LOSARTAN POTASSIUM 25 MG/1
25 TABLET ORAL DAILY
Qty: 30 TABLET | Refills: 3 | Status: SHIPPED | OUTPATIENT
Start: 2021-05-05 | End: 2021-05-24 | Stop reason: SDUPTHER

## 2021-05-04 RX ORDER — METOPROLOL SUCCINATE 50 MG/1
50 TABLET, EXTENDED RELEASE ORAL DAILY
Status: DISCONTINUED | OUTPATIENT
Start: 2021-05-05 | End: 2021-05-05 | Stop reason: HOSPADM

## 2021-05-04 RX ORDER — TORSEMIDE 100 MG/1
50 TABLET ORAL 2 TIMES DAILY
Status: DISCONTINUED | OUTPATIENT
Start: 2021-05-04 | End: 2021-05-05 | Stop reason: HOSPADM

## 2021-05-04 RX ORDER — METOPROLOL SUCCINATE 50 MG/1
50 TABLET, EXTENDED RELEASE ORAL DAILY
Qty: 30 TABLET | Refills: 3 | Status: SHIPPED | OUTPATIENT
Start: 2021-05-05 | End: 2021-05-24 | Stop reason: SDUPTHER

## 2021-05-04 RX ORDER — TORSEMIDE 10 MG/1
50 TABLET ORAL 2 TIMES DAILY
Qty: 30 TABLET | Refills: 3 | Status: SHIPPED | OUTPATIENT
Start: 2021-05-04 | End: 2021-05-10

## 2021-05-04 RX ORDER — AMIODARONE HYDROCHLORIDE 200 MG/1
200 TABLET ORAL DAILY
Qty: 30 TABLET | Refills: 1 | Status: SHIPPED
Start: 2021-05-05 | End: 2021-05-05 | Stop reason: HOSPADM

## 2021-05-04 RX ORDER — WARFARIN SODIUM 5 MG/1
5 TABLET ORAL DAILY
Status: DISCONTINUED | OUTPATIENT
Start: 2021-05-04 | End: 2021-05-05 | Stop reason: HOSPADM

## 2021-05-04 RX ORDER — SPIRONOLACTONE 25 MG/1
25 TABLET ORAL DAILY
Qty: 30 TABLET | Refills: 3 | Status: SHIPPED | OUTPATIENT
Start: 2021-05-05 | End: 2021-05-10

## 2021-05-04 RX ADMIN — INSULIN LISPRO 2 UNITS: 100 INJECTION, SOLUTION INTRAVENOUS; SUBCUTANEOUS at 09:14

## 2021-05-04 RX ADMIN — AMIODARONE HYDROCHLORIDE 200 MG: 200 TABLET ORAL at 09:13

## 2021-05-04 RX ADMIN — MUPIROCIN: 20 OINTMENT TOPICAL at 09:13

## 2021-05-04 RX ADMIN — PANTOPRAZOLE SODIUM 40 MG: 40 TABLET, DELAYED RELEASE ORAL at 05:05

## 2021-05-04 RX ADMIN — OXYCODONE AND ACETAMINOPHEN 1 TABLET: 325; 10 TABLET ORAL at 13:29

## 2021-05-04 RX ADMIN — OXYCODONE AND ACETAMINOPHEN 1 TABLET: 325; 10 TABLET ORAL at 21:16

## 2021-05-04 RX ADMIN — MILRINONE LACTATE 0.1 MCG/KG/MIN: 200 INJECTION, SOLUTION INTRAVENOUS at 05:58

## 2021-05-04 RX ADMIN — NAPHAZOLINE HYDROCHLORIDE AND PHENIRAMINE MALEATE 1 DROP: .25; 3 SOLUTION/ DROPS OPHTHALMIC at 21:19

## 2021-05-04 RX ADMIN — INSULIN GLARGINE 10 UNITS: 100 INJECTION, SOLUTION SUBCUTANEOUS at 21:20

## 2021-05-04 RX ADMIN — HEPARIN SODIUM 5000 UNITS: 5000 INJECTION INTRAVENOUS; SUBCUTANEOUS at 05:04

## 2021-05-04 RX ADMIN — METOPROLOL SUCCINATE 25 MG: 25 TABLET, EXTENDED RELEASE ORAL at 10:14

## 2021-05-04 RX ADMIN — ENOXAPARIN SODIUM 90 MG: 100 INJECTION SUBCUTANEOUS at 18:31

## 2021-05-04 RX ADMIN — MONTELUKAST SODIUM 10 MG: 10 TABLET, FILM COATED ORAL at 21:16

## 2021-05-04 RX ADMIN — OXYCODONE 5 MG: 5 TABLET ORAL at 03:50

## 2021-05-04 RX ADMIN — LOSARTAN POTASSIUM 25 MG: 25 TABLET, FILM COATED ORAL at 09:13

## 2021-05-04 RX ADMIN — TORSEMIDE 50 MG: 100 TABLET ORAL at 18:32

## 2021-05-04 RX ADMIN — WARFARIN SODIUM 5 MG: 5 TABLET ORAL at 18:32

## 2021-05-04 RX ADMIN — NAPHAZOLINE HYDROCHLORIDE AND PHENIRAMINE MALEATE 1 DROP: .25; 3 SOLUTION/ DROPS OPHTHALMIC at 09:13

## 2021-05-04 RX ADMIN — DIPHENHYDRAMINE HYDROCHLORIDE 25 MG: 25 TABLET ORAL at 21:46

## 2021-05-04 RX ADMIN — ASPIRIN 81 MG: 81 TABLET, CHEWABLE ORAL at 09:13

## 2021-05-04 RX ADMIN — DIPHENHYDRAMINE HYDROCHLORIDE 25 MG: 25 TABLET ORAL at 03:44

## 2021-05-04 RX ADMIN — INSULIN LISPRO 2 UNITS: 100 INJECTION, SOLUTION INTRAVENOUS; SUBCUTANEOUS at 18:32

## 2021-05-04 RX ADMIN — SPIRONOLACTONE 25 MG: 25 TABLET ORAL at 09:13

## 2021-05-04 RX ADMIN — Medication 10 ML: at 21:20

## 2021-05-04 ASSESSMENT — PAIN DESCRIPTION - ORIENTATION
ORIENTATION: UPPER

## 2021-05-04 ASSESSMENT — PAIN SCALES - GENERAL
PAINLEVEL_OUTOF10: 0
PAINLEVEL_OUTOF10: 7
PAINLEVEL_OUTOF10: 6
PAINLEVEL_OUTOF10: 7

## 2021-05-04 ASSESSMENT — PAIN DESCRIPTION - LOCATION
LOCATION: BACK

## 2021-05-04 ASSESSMENT — PAIN DESCRIPTION - FREQUENCY
FREQUENCY: CONTINUOUS

## 2021-05-04 ASSESSMENT — PAIN DESCRIPTION - PAIN TYPE
TYPE: CHRONIC PAIN

## 2021-05-04 ASSESSMENT — PAIN DESCRIPTION - PROGRESSION
CLINICAL_PROGRESSION: NOT CHANGED

## 2021-05-04 ASSESSMENT — PAIN DESCRIPTION - ONSET
ONSET: ON-GOING
ONSET: ON-GOING

## 2021-05-04 ASSESSMENT — PAIN - FUNCTIONAL ASSESSMENT
PAIN_FUNCTIONAL_ASSESSMENT: ACTIVITIES ARE NOT PREVENTED
PAIN_FUNCTIONAL_ASSESSMENT: ACTIVITIES ARE NOT PREVENTED
PAIN_FUNCTIONAL_ASSESSMENT: PREVENTS OR INTERFERES SOME ACTIVE ACTIVITIES AND ADLS
PAIN_FUNCTIONAL_ASSESSMENT: ACTIVITIES ARE NOT PREVENTED

## 2021-05-04 ASSESSMENT — PAIN DESCRIPTION - DESCRIPTORS
DESCRIPTORS: ACHING
DESCRIPTORS: ACHING;CONSTANT
DESCRIPTORS: ACHING

## 2021-05-04 NOTE — PROGRESS NOTES
Shift assessment completed. Pt is a/o X 4. VSS. Medications administered, see MAR. POC discussed and all questions answered. Pt provided with fresh ice water. Pt has belongings and call light in reach. Bed is locked and in lowest position, bed alarm is refused by pt and pt agrees to call for assistance to ambulate. Pt dtr is at the bedside. Pt denies further needs, will continue to monitor.

## 2021-05-04 NOTE — PROGRESS NOTES
[facility]   Electrophysiology Progress Note     Date: 5/4/2021  Admit Date: 4/15/2021     Reason for consultation: Atrial fibrillation    Chief Complaint:   Chief Complaint   Patient presents with    Chest Pain    Back Pain       History of Present Illness: History obtained from patient and medical record. Jessica Triana is a 77 y.o. female with a past medical history of HTN, HLD, DM, CAD s/p CABG x 3, S/p bioprosthetic MVR, WAYNE clip (8/2018), afib s/p Maze 2018, CMP (EF25-30%) DVT/PE on warfarin and sCHF follows with HF. S/p ablation of afib w/ PVI, roofline, posterior, CTI flutter 10/30/2019. S/p ILR. Hx of Mobitz I AVB and prolonged MO interval. S/p JUDE/CV. Has been treated with amiodarone stopped in 2018.      Presented with palpitations and tachycardia, found to be in afib/RVR and started on diltiazem gtt. Reportedly her carvedilol was reduced recently. Interval Hx: Today, she is being seen for follow up. Plans for JUDE today with Dr. Ambrosio Garcias to assess her aortic valve. She remains in atrial flutter with mildly tachycardic. Discussed cardioverting today now that she is amiodarone loaded, pt/spouse agreeable    Patient seen and examined. Clinical notes reviewed. Telemetry reviewed. No new complaints today. No major events overnight. Denies having chest pain, palpitations, shortness of breath, orthopnea/PND, cough, or dizziness at the time of this visit. Allergies: Allergies   Allergen Reactions    Ohfshxmp-Hqfdgzt-Pythoj [Fluocinolone] Shortness Of Breath    Ciprofloxacin Shortness Of Breath    Diovan [Valsartan] Shortness Of Breath    Flagyl [Metronidazole] Shortness Of Breath     Has taken diflucan at home 12/7/15    Metformin And Related [Metformin And Related] Shortness Of Breath    Benazepril      Other reaction(s): Not Recorded    Morphine      Bad reaction. \"makes her feel horrible\".      Saxagliptin      Other reaction(s): Not Recorded    Levaquin [Levofloxacin] Rash Home Meds:  Prior to Visit Medications    Medication Sig Taking? Authorizing Provider   spironolactone (ALDACTONE) 25 MG tablet Take 2 tablets by mouth daily Yes Violeta Schuster Check, APRN - CNS   torsemide (DEMADEX) 20 MG tablet 40mg morning, 40mg afternoon, 20mg evening Yes Praneeth James MD   carvedilol (COREG) 6.25 MG tablet Take 1 tablet by mouth daily Yes Praneeth James MD   gabapentin (NEURONTIN) 300 MG capsule Take 1 capsule by mouth nightly for 90 days. Intended supply: 30 days Yes Fabi Lockwood MD   insulin glargine (LANTUS SOLOSTAR) 100 UNIT/ML injection pen INJECT 26 UNITS IN THE MORNING AND 18 UNITS AT BEDTIME Yes Sneha Calderon MD   exenatide (BYETTA 10 MCG PEN) 10 MCG/0.04ML injection Inject 0.04 mLs into the skin 2 times daily (with meals) Yes Fabi Lockwood MD   OXYCODONE HCL PO Take 10 mg by mouth As of 1/21/2021,  states patient is taking 10mg with edge being taken off her pain after 3 hours.  Yes Historical Provider, MD   ACETAMINOPHEN PO Take 500 mg by mouth every 6 hours as needed  Yes Historical Provider, MD   albuterol sulfate  (90 Base) MCG/ACT inhaler USE 2 INHALATIONS EVERY 6 HOURS AS NEEDED FOR WHEEZING Yes Fabi Lockwood MD   albuterol (PROVENTIL) (2.5 MG/3ML) 0.083% nebulizer solution Take 3 mLs by nebulization every 6 hours as needed for Wheezing Yes Fabi Lockwood MD   polyethylene glycol (GLYCOLAX) 17 GM/SCOOP powder Take 17 g by mouth every other day  Yes Historical Provider, MD   omeprazole (PRILOSEC) 20 MG delayed release capsule Take 20 mg by mouth daily Yes Historical Provider, MD   ondansetron (ZOFRAN) 4 MG tablet Take 1 tablet by mouth 3 times daily as needed for Nausea or Vomiting Yes Fabi Lockwood MD   aspirin 81 MG chewable tablet Take 1 tablet by mouth daily Yes Fabi Lockwood MD   vitamin B-12 500 MCG tablet Take 1 tablet by mouth daily Yes Gregorio Quinteros MD   potassium chloride (KLOR-CON M) 10 MEQ extended release tablet bisacodyl, diphenhydrAMINE, oxyCODONE-acetaminophen, sodium chloride flush, oxyCODONE, albuterol, promethazine **OR** ondansetron, polyethylene glycol, acetaminophen **OR** acetaminophen, glucose, dextrose, glucagon (rDNA), dextrose     Past Medical History:  Past Medical History:   Diagnosis Date    Asthma     Atrial fibrillation (HCC)     Eosinophilia     Hemoptysis     HIGH CHOLESTEROL     Hx of blood clots     Hypertension     Irregular heart beat     Other specified gastritis without mention of hemorrhage     Palpitations     Skin cancer     basal and squamous    Type II or unspecified type diabetes mellitus without mention of complication, not stated as uncontrolled       Past Surgical History:    has a past surgical history that includes Cholecystectomy;  section; Colonoscopy (2017); skin biopsy; bronchoscopy (2016); Coronary artery bypass graft (2018); Mitral valve replacement (2018); transesophageal echocardiogram (2018); Tunneled venous catheter placement (Left, 2018); Cardiac catheterization (2018); Insertable Cardiac Monitor (Left, 2018); Colonoscopy (2014); Hysterectomy; Upper gastrointestinal endoscopy (N/A, 10/10/2018); Colonoscopy (10/10/2018); Colonoscopy (N/A, 2020); Pain management procedure (N/A, 2020); and Pain management procedure (Bilateral, 2021). Social History:  Reviewed. reports that she has never smoked. She has never used smokeless tobacco. She reports that she does not drink alcohol or use drugs. Family History:  Reviewed. family history includes Asthma in her mother; Cancer in her father; Heart Disease in her mother; High Blood Pressure in her mother; Hypertension in her mother.      Review of Systems:  · Constitutional: Negative for fever, night sweats, chills, weight changes, or weakness  · Skin: Negative for rash, dry skin, pruritus, bruising, bleeding, blood clots, or changes in skin pigment  · HEENT: Negative for vision changes, ringing in the ears, sore throat, dysphagia, or swollen lymph nodes  · Respiratory: Positive for SOB/LEVIN  · Cardiovascular: Reviewed in HPI  · Gastrointestinal: Negative for abdominal pain, N/V/D, constipation, or black/tarry stools  · Genito-Urinary: Negative for dysuria, incontinence, urgency, or hematuria  · Musculoskeletal: Negative for joint swelling, muscle pain, or injuries  · Neurological/Psych: Negative for confusion, seizures, headaches, balance issues or TIA-like symptoms. No anxiety, depression, or insomnia    Physical Examination:  Vitals:    05/04/21 0903   BP: 125/79   Pulse: 121   Resp: 18   Temp: 98.1 °F (36.7 °C)   SpO2: 94%      In: 109.4 [I.V.:109.4]  Out: -    Wt Readings from Last 3 Encounters:   05/04/21 198 lb 3.2 oz (89.9 kg)   04/13/21 205 lb (93 kg)   04/02/21 206 lb 9.6 oz (93.7 kg)       Intake/Output Summary (Last 24 hours) at 5/4/2021 1056  Last data filed at 5/4/2021 0602  Gross per 24 hour   Intake 109.41 ml   Output 200 ml   Net -90.59 ml       Telemetry: Personally Reviewed  - Atrial flutter, rate 110s  · Constitutional: Cooperative and in no apparent distress, and appears well nourished  · Skin: Warm and pink; no pallor, cyanosis, bruising, or clubbing  · HEENT: Symmetric and normocephalic. PERRL, EOM intact. Conjunctiva pink with clear sclera. Mucus membranes pink and moist. Teeth intact. Thyroid smooth without nodules or goiter. · Cardiovascular: Mildly tachycardic rate and regular rhythm. S1/S2 present without rubs, or gallops. + Murmur. Peripheral pulses 2+, capillary refill < 3 seconds. No elevation of JVP. + BLE edema (improved)  · Respiratory: Respirations symmetric and unlabored. Lungs clear to auscultation bilaterally, no wheezing, crackles, or rhonchi  · Gastrointestinal: Abdomen soft and round. Bowel sounds normoactive in all quadrants without tenderness or masses.  + obese  · Musculoskeletal: Bilateral upper and lower Mild to moderate tricuspid regurgitation. Estimated pulmonary artery systolic pressure is mildly to moderately elevated at 46 mmHg assuming a right atrial pressure of 8 mmHg. ECHO: 10/20   Mildly dilated left ventricular size and normal wall thickness. Global left   ventricular function is severely decreased with ejection fraction estimated   at 25%. Patient is in atrial fibrillation during procedure. The bioprosthetic mitral valve is well seated with a mean gradient of 5 mmHg   and maximum pressure gradient of 15 mmHg with heart rate of 89 bpm. Pressure   half time of 82 ms. There is trivial mitral regurgitation. Left atrial enlargement. Spontaneous echo contrast seen in the left atrium. A large stump of the left atrial appendage with no thrombus noted. Patient   has history of surgical ligation of the left atrial appendage. There are   spontaneous echo contrast in the atrial appendage. The aortic valve is thickened/calcified with decreased leaflet mobility consistent with aortic stenosis. There is trivial aortic insufficiency. There is moderate tricuspid regurgitation    Stress Test: 8/18   Small sized lateral completely reversible defect of mild intensity    consistent with ischemia in the territory of the LCx and/or LAD .    Normal LV function.    Overall findings represent a intermediate risk scan. [facility]: 5/3/21  Findings:  Artery Findings/Result  LM Normal  LAD 50% mid, 70% mid, competitive flow distal from LIMA  Cx OM1 20% ostial to prox, superior branch mid OM1 50% ostial  RI N/A  S-D-RPL patent  L-LAD patent  RCA 50-60% distal, very heavily calcified  LVEDP 15  AV Peak to peak gradient 36mmHg, valve crossed easily with pigtail.   LVG NA     Intervention:         None     Post Cath Dx:       Patent grafts  JUDE tomorrow to evaluate aortic valve although does not appear severe by cath or TTE    CXR: 5/2/21  No acute process.       Stable cardiomegaly     Problem List:   Patient Active 12/19/2012    Hypercholesteremia 12/19/2012        Assessment and Plan:     1. Persistent Atrial Fibrillation  - Hx of WAYNE clip (2018); JUDE shows appendage not occluded  - S/p DCCV x2 this admission(failed)     - Currently in atrial flutter, rate 110s  - Continue Toprol XL 50 mg QD   - Due to significant CAD and cardiomyopathy, anti-arrhythmic therapies are limited to amiodarone. I have discussed with patient side effects of amiodarone including but not limited to thyroid disease, discoloration of skin and cornea and pulmonary fibrosis. Patient would like to continue with it.     ~ Continue amiodarone 200 mg daily   ~ ALT 10, AST 15, TSH 1.67 (4/15/21)    - [id number] score:high ; [id number] Vasc score and anticoagulation discussed. High risk for stroke and thromboembolism. Anticoagulation is recommended. Risk of bleeding was discussed.  ~ INR 1. 14. Resume tonight. Will bridge on therapeutic lovenox BID until INR 2-3      - Treatment options including cardioversion, rate control strategy, antiarrhythmics, anticoagulation and possible ablation were discussed with patient. Risks, benefits and alternative of each treatment options were explained. All questions answered    ~ Will plan for DCCV today with Dr. Pete Crandall following her JUDE to see if she will remain in sinus rhythm now that she is loaded with amiodarone    2. Acute on Chronic systolic heart failure (NYHA Class IV)  - Appears decompensated, but improving   ~ EF 10-15% per echo (11/20)  - Continue with Toprol XL 50 mg QD, losartan 25 mg QD, spironolactone 25 mg QD  - Resume torsemide today (will discuss dosing with Dr. Eros Garcia)   - On primacor gtt (Instructed nurse to stop gtt)   - Monitor I&Os, daily weights   - CHF team following    3.  ICM   - Worsening; EF now down to 10-15% (Been on OMT prior to admission)    ~ [address] with patents grafts (5/3/21)     - Will plan for AICD implantation when she is optimized from CHF perspective (will need to be Class III or

## 2021-05-04 NOTE — FLOWSHEET NOTE
Patient returned from cath lab.  Report received from cath lab RN.     05/04/21 1510   Vital Signs   Pulse 80   Heart Rate Source Monitor   Resp 18   BP 98/61   BP Location Left lower arm   MAP (mmHg) 73   Patient Position Semi fowlers   Level of Consciousness Alert (0)   Oxygen Therapy   SpO2 91 %   O2 Device None (Room air)

## 2021-05-04 NOTE — PROGRESS NOTES
Clinical Pharmacy Note    HF Core Measures Assessment    Latest EF: 10-15%    ACE/ARB (EF<40%): losartan 25 mg daily  Evidence based BB (bisoprolol, metoprolol XL, carvedilol) (EF<40%): carvedilol 3.125 mg BID   Aldosterone Antagonist (EF<35%): spironolactone 25 mg daily    (May consider the use of hydralazine + nitrate in patients intolerant to ACEI/ARB)    Johnathan Arriola, PharmD, BCPS  Clinical Pharmacist  [id number]

## 2021-05-04 NOTE — PRE SEDATION
Sedation Pre-Procedure Note    Patient Name: Dillon Ibrahim   YOB: 1945  Room/Bed: [facility]-3327/3327-01  Medical Record Number: [id number]  Date: 2021   Time: 2:35 PM       Indication:  cardioversion     Consent: I have discussed with the patient and/or the patient representative the indication, alternatives, and the possible risks and/or complications of the planned procedure and the anesthesia methods. The patient and/or patient representative appear to understand and agree to proceed. Vital Signs:   Vitals:    21 1200   BP: 135/83   Pulse: 114   Resp:    Temp:    SpO2: 94%       Past Medical History:   has a past medical history of Asthma, Atrial fibrillation ([address] Eosinophilia, Hemoptysis, HIGH CHOLESTEROL, Hx of blood clots, Hypertension, Irregular heart beat, Other specified gastritis without mention of hemorrhage, Palpitations, Skin cancer, and Type II or unspecified type diabetes mellitus without mention of complication, not stated as uncontrolled. Past Surgical History:   has a past surgical history that includes Cholecystectomy;  section; Colonoscopy (2017); skin biopsy; bronchoscopy (2016); Coronary artery bypass graft (2018); Mitral valve replacement (2018); transesophageal echocardiogram (2018); Tunneled venous catheter placement (Left, 2018); Cardiac catheterization (2018); Insertable Cardiac Monitor (Left, 2018); Colonoscopy (2014); Hysterectomy; Upper gastrointestinal endoscopy (N/A, 10/10/2018); Colonoscopy (10/10/2018); Colonoscopy (N/A, 2020); Pain management procedure (N/A, 2020); and Pain management procedure (Bilateral, 2021).     Medications:   Scheduled Meds:    [START ON 2021] metoprolol succinate  50 mg Oral Daily    torsemide  50 mg Oral BID    heparin (porcine)  5,000 Units Subcutaneous 3 times per day    spironolactone  25 mg Oral Daily    losartan  25 mg Oral Daily    insulin glargine  10 Units Subcutaneous Nightly    warfarin (COUMADIN) daily dosing (placeholder)   Other RX Placeholder    amiodarone  200 mg Oral Daily    naphazoline-pheniramine  1 drop Both Eyes BID    polyethylene glycol  17 g Oral BID    aspirin  81 mg Oral Daily    gabapentin  300 mg Oral Nightly    montelukast  10 mg Oral Nightly    pantoprazole  40 mg Oral QAM AC    sodium chloride flush  5-40 mL Intravenous 2 times per day    insulin lispro  0-12 Units Subcutaneous TID WC    insulin lispro  0-6 Units Subcutaneous Nightly     Continuous Infusions:    sodium chloride 25 mL (04/30/21 1015)    dextrose       PRN Meds: sodium chloride, potassium chloride, bisacodyl, diphenhydrAMINE, oxyCODONE-acetaminophen, sodium chloride flush, oxyCODONE, albuterol, promethazine **OR** ondansetron, polyethylene glycol, acetaminophen **OR** acetaminophen, glucose, dextrose, glucagon (rDNA), dextrose  Home Meds:   Prior to Admission medications    Medication Sig Start Date End Date Taking? Authorizing Provider   amiodarone (CORDARONE) 200 MG tablet Take 1 tablet by mouth daily 5/5/21  Yes Laqueta Gosselin, MD   losartan (COZAAR) 25 MG tablet Take 1 tablet by mouth daily 5/5/21  Yes Laqueta Gosselin, MD   metoprolol succinate (TOPROL XL) 50 MG extended release tablet Take 1 tablet by mouth daily 5/5/21  Yes Laqueta Gosselin, MD   spironolactone (ALDACTONE) 25 MG tablet Take 1 tablet by mouth daily 5/5/21  Yes Laqueta Gosselin, MD   torsemide (DEMADEX) 10 MG tablet Take 5 tablets by mouth 2 times daily 5/4/21  Yes Laqueta Gosselin, MD   gabapentin (NEURONTIN) 300 MG capsule Take 1 capsule by mouth nightly for 90 days.  Intended supply: 30 days 3/12/21 6/10/21 Yes Marylene Hartigan, MD   insulin glargine (LANTUS SOLOSTAR) 100 UNIT/ML injection pen INJECT 26 UNITS IN THE MORNING AND 18 UNITS AT BEDTIME 3/10/21  Yes Elizabeth Sarmiento MD   exenatide (BYETTA 10 MCG PEN) 10 MCG/0.04ML injection Inject 0.04 mLs into the skin 2 times daily (with meals) 3/3/21  Yes Mathew Lange MD   OXYCODONE HCL PO Take 10 mg by mouth As of 1/21/2021,  states patient is taking 10mg with edge being taken off her pain after 3 hours. Yes Historical Provider, MD   ACETAMINOPHEN PO Take 500 mg by mouth every 6 hours as needed    Yes Historical Provider, MD   albuterol sulfate  (90 Base) MCG/ACT inhaler USE 2 INHALATIONS EVERY 6 HOURS AS NEEDED FOR WHEEZING 11/17/20  Yes Mathew Lange MD   albuterol (PROVENTIL) (2.5 MG/3ML) 0.083% nebulizer solution Take 3 mLs by nebulization every 6 hours as needed for Wheezing 6/2/20  Yes Mathew Lange MD   polyethylene glycol (GLYCOLAX) 17 GM/SCOOP powder Take 17 g by mouth every other day    Yes Historical Provider, MD   omeprazole (PRILOSEC) 20 MG delayed release capsule Take 20 mg by mouth daily   Yes Historical Provider, MD   ondansetron (ZOFRAN) 4 MG tablet Take 1 tablet by mouth 3 times daily as needed for Nausea or Vomiting 3/26/20  Yes Mathew Lange MD   aspirin 81 MG chewable tablet Take 1 tablet by mouth daily 6/7/19  Yes Mathew Lange MD   vitamin B-12 500 MCG tablet Take 1 tablet by mouth daily 10/12/18  Yes Catalina Meneses MD   potassium chloride (KLOR-CON M) 10 MEQ extended release tablet TAKE 1 TABLET DAILY 4/21/21   Mathew Lange MD   montelukast (SINGULAIR) 10 MG tablet TAKE 1 TABLET NIGHTLY 4/21/21   Mathew Lange MD   warfarin (COUMADIN) 5 MG tablet TAKE 1 TABLET DAILY 4/21/21   Mathew Lange MD   blood glucose test strips (FREESTYLE LITE) strip USE TO TEST FOUR TIMES A DAY 1/5/21   Mathew Lange MD   FreeStyle Lancets MISC 1 each by Does not apply route 4 times daily 4/29/20   Mathew Lange MD   Blood Glucose Monitoring Suppl (EMBRACE PRO GLUCOSE METER) GAETANO 1 Device by Does not apply route 4 times daily 2/4/20   Mathew Lange MD   HUMALOG KWIKPEN 100 UNIT/ML SOPN TAKE AS INSTRUCTED BY YOUR PRESCRIBER  Patient taking differently:  Only if high blood sugar-has not been using 12/30/19   José Todd MD   nystatin (MYCOSTATIN) 091919 UNIT/ML suspension TAKE ONE TEASPOONFUL BY MOUTH FOUR TIMES A DAY FOR 5 DAYS 11/20/19   José Todd MD   BD PEN NEEDLE JANNET U/F 32G X 4 MM MISC USE 1 PEN NEEDLE FOUR TIMES A DAY 3/22/19   José Todd MD     Coumadin Use Last 7 Days:  no  Antiplatelet drug therapy use last 7 days: Other anticoagulant use last 7 days: yes - heparin  Additional Medication Information:        Pre-Sedation Documentation and Exam:   I have personally completed a history, physical exam & review of systems for this patient (see notes).     Mallampati Airway Assessment:  Mallampati Class II - (soft palate, fauces & uvula are visible)    Prior History of Anesthesia Complications:   none    ASA Classification:  Class 3 - A patient with severe systemic disease that limits activity but is not incapacitating    Sedation/ Anesthesia Plan:   intravenous sedation    Medications Planned:   midazolam (Versed) intravenously, fentanyl intravenously and Brevital    Patient is an appropriate candidate for plan of sedation: yes    Electronically signed by Martin Terrell MD on 5/4/2021 at 2:35 PM

## 2021-05-04 NOTE — PLAN OF CARE
Problem: Cardiovascular  Goal: No DVT, peripheral vascular complications  Outcome: Met This Shift     Problem: Respiratory  Goal: No pulmonary complications  Outcome: Met This Shift  Goal: O2 Sat > 90%  Outcome: Met This Shift     Problem: Pain:  Goal: Control of acute pain  Description: Control of acute pain  Outcome: Ongoing  Note:    Pt c/o pain. Pt assessed for breathing and BP. Pt educated on pain scale. Medication administered. Pt repositioned. Stimuli reduced. Rest promoted. Pain reassessed. Will continue to monitor.

## 2021-05-04 NOTE — PROGRESS NOTES
Hospitalist Progress Note      PCP: Mathew Lange MD    Date of Admission: 4/15/2021    Chief Complaint: CHF    Hospital Course:   Feels well  Cardiac cath did not show any significant coronary artery disease  JUDE  today  Milrinone drip stopped      Medications:  Reviewed      Exam:    /83   Pulse 114   Temp 98.1 °F (36.7 °C) (Oral)   Resp 18   Ht 5' 8\" (1.727 m)   Wt 198 lb 3.2 oz (89.9 kg)   SpO2 94%   BMI 30.14 kg/m²     General appearance: No apparent distress, appears stated age and cooperative. HEENT: Pupils equal, round, and reactive to light. Conjunctivae/corneas clear. Neck: Supple, with full range of motion. No jugular venous distention. Trachea midline. Respiratory:  Normal respiratory effort. Clear to auscultation, bilaterally without RALES/WHEEZES/Rhonchi. Cardiovascular: Regular rate and rhythm with normal S1/S2 without MURMURS, rubs or gallops. Abdomen: Soft, non-tender, non-distended with normal bowel sounds. Musculoskeletal: No clubbing, cyanosis or EDEMA bilaterally. Full range of motion without deformity. Skin: Skin color, texture, turgor normal.  No rashes or lesions. Neurologic:  Neurovascularly intact without any focal sensory/motor deficits. Cranial nerves: II-XII intact, grossly non-focal.  Physical exam remains unchanged from 5/3      Labs:   No results for input(s): WBC, HGB, HCT, PLT in the last 72 hours. Recent Labs     05/02/21  2030 05/03/21  0515 05/04/21  0500   * 137 139   K 3.9 3.7 4.0   CL 90* 94* 97*   CO2 33* 34* 33*   BUN 37* 35* 29*   CREATININE 1.8* 1.6* 1.6*   CALCIUM 8.8 9.1 9.2   PHOS 3.2 3.6 3.7     No results for input(s): AST, ALT, BILIDIR, BILITOT, ALKPHOS in the last 72 hours. Recent Labs     05/02/21  0520 05/03/21  0515 05/04/21  0500   INR 1.43* 1.27* 1.14     No results for input(s): Riana Ashraf in the last 72 hours.     Urinalysis:      Lab Results   Component Value Date    NITRU Negative 04/15/2021    WBCUA 3 04/15/2021    BACTERIA trace 02/12/2021    BACTERIA 1+ 05/11/2020    RBCUA 2 04/15/2021    BLOODU TRACE-INTACT 04/15/2021    SPECGRAV 1.020 04/15/2021    GLUCOSEU Negative 04/15/2021       Radiology:  XR CHEST PORTABLE   Final Result   No acute process. Stable cardiomegaly         XR ABDOMEN (KUB) (SINGLE AP VIEW)   Final Result   Moderate stool burden in the colon      Findings suggest an ileus         XR CHEST PORTABLE   Final Result   No acute cardiopulmonary disease. Stable cardiomegaly with no evidence of   overt failure.              Assessment/Plan:    Active Hospital Problems    Diagnosis Date Noted    Chronic diastolic heart failure (HCC) [I50.32]     Hypokalemia [E87.6]     Acute on chronic systolic CHF (congestive heart failure) (HCC) [I50.23] 04/26/2021    Hypoglycemia [E16.2] 04/26/2021    Atrial fibrillation with rapid ventricular response (HCC) [I48.91]     Chest pain [R07.9]     Dyspnea on exertion [R06.00]     Acute kidney injury superimposed on CKD ([address] [N17.9, N18.9]     Stage 3 chronic kidney disease [N18.30]     Coronary artery disease involving native heart without angina pectoris [I25.10]     Persistent atrial fibrillation ([address] [I48.19] 10/30/2019    S/P MVR (mitral valve repair) [Z98.890]     Chronic systolic CHF (congestive heart failure), NYHA class 3 ([address] [I50.22] 01/15/2019    Obesity, Class I, BMI 30-34.9 [E66.9]     S/P CABG x 3 [Z95.1] 08/22/2018    PAF (paroxysmal atrial fibrillation) ([address] [I48.0]     Type 2 diabetes mellitus with hyperglycemia, with long-term current use of insulin ([address] [E11.65, Z79.4] 04/27/2017       Acute Medical Issues Being Addressed:    72-year-old lady admitted to the hospital with dyspnea    Acute on chronic severe systolic heart failure  Last ejection fraction 10 to 15%  During the course of her stay she was diuresed with Lasix drip and started on milrinonedrip  Has been 15 L negative since admission  Her Lasix was changed to oral torsemide 50 bid  Spironolactone 25 mg po, losartan 25 mg po ,metoprolol xl  restarted   Cards  Suggested using metalozone on discharge  JUDE done showed SERAFIN 1.5 cm no sever AS  AICD to be done as an outpatient per electrophysiology    Chronic atrial fibrillation  Atrial fibrillation is persistent  Has a history of left atrial appendage clipping  Failed cardioversion  Per electrophysiology continue amiodarone  She had repeat cardioversion today on 5/4  After discussing with electrophysiology she is being bridged to Coumadin with Lovenox  We will need to continue Lovenox until INR therapeutic    CAD s/p CABG  Continue aspirin beta-blocker and statin    Aortic insufficiency  JUDE today    Hypertension  Blood pressure controlled    Acute kidney injury on chronic kidney disease  Creatinine remained stable at around 1.5  Her creatinine initially went up during diuresis however with ongoing diuresis and milrinone her creatinine is stabilized at around 1.5  She has been resumed on oral torsemide      I discussed with heart failure team.  They recommended that she be monitored overnight.   The heart failure team will see her first in the morning and if everything is stable she can be discharged tomorrow  All her scripts have been sent down to pharmacy  I have put in home care orders as well      DVT Prophylaxis: sc lovenox   Diet: Diet NPO Effective Now Exceptions are: Sips of Water with Meds  Code Status: Full Code      Dispo - once acute medical processes have resolved    Radha Murcia MD

## 2021-05-04 NOTE — PROGRESS NOTES
Pharmacy to Dose Warfarin    Pharmacy consulted to dose warfarin for Afib. INR Goal: 2-3    INR today: 1.14    Assessment/Plan:  - INR subtherapeutic today after holding warfarin for procedures  - Will resume tonight and plan to bridge with lovenox per discussion with SOLEDAD Sanderson-CNP  - Will resume warfarin at dose of 5 mg tonight  - Daily INR ordered    Pharmacy will continue to follow.     Penelope Farrell, PharmD, [facility]  Clinical Pharmacist  [id number]

## 2021-05-04 NOTE — PROGRESS NOTES
Nephrology Attending  Progress Note        SUMMARY :  We are following this patient for TORIBIO on CKD stage 3   77 y.o. female who yesterday night started to have midsternal chest pain that was 7 out of 10 pressure-like associated with palpitations heart rate was up to 140s. Was short of breath no diaphoresis some nausea no vomiting. Patient came to the ED was in A. fib with RVR.      Interval History (Chart/Data reviewed):   -pt seen and examined  -PMSHx and meds reviewed  -No family at bedside    Started on aldactone/losartan  S/p LHC-patent grafts, plan for JUDE  BP stable  On milrinone drip-being weaned        ROS: neg chest pain/SOB/fever/chills         Physical Exam:    VITALS:  /79   Pulse 121   Temp 98.1 °F (36.7 °C) (Oral)   Resp 18   Ht 5' 8\" (1.727 m)   Wt 198 lb 3.2 oz (89.9 kg)   SpO2 94%   BMI 30.14 kg/m²   BLOOD PRESSURE RANGE:  Systolic (74SIX), AIZ:548 , Min:103 , HUF:054   ; Diastolic (49ZZB), NVR:29, Min:66, Max:83    24HR INTAKE/OUTPUT:      Intake/Output Summary (Last 24 hours) at 5/4/2021 1044  Last data filed at 5/4/2021 0602  Gross per 24 hour   Intake 109.41 ml   Output 200 ml   Net -90.59 ml   O > I       Gen:  alert, oriented x 3  Anicteric, NC/AT  CV: IRRR , Gr 3/6 systolic murmur heard all over precordium , especially at base of heart  Lungs:B/ L air entry, Normal breath sounds ,  No crackles   Abd: soft, bowel sounds + , No organomegaly   Ext: trace to 1+ edema, no cyanosis, erythema + Shins  Neuro: nonfocal.      DATA:    CBC with Differential:    Lab Results   Component Value Date    WBC 6.0 05/01/2021    RBC 3.94 05/01/2021    HGB 10.4 05/01/2021    HCT 32.6 05/01/2021     05/01/2021    MCV 82.7 05/01/2021    MCH 26.3 05/01/2021    MCHC 31.8 05/01/2021    RDW 16.9 05/01/2021    SEGSPCT 64.1 09/02/2020    BANDSPCT 1 01/14/2019    LYMPHOPCT 13.3 05/01/2021    MONOPCT 14.2 05/01/2021    BASOPCT 1.1 05/01/2021    MONOSABS 0.9 05/01/2021    LYMPHSABS 0.8 05/01/2021

## 2021-05-05 VITALS
WEIGHT: 199.25 LBS | TEMPERATURE: 97.5 F | SYSTOLIC BLOOD PRESSURE: 106 MMHG | RESPIRATION RATE: 16 BRPM | OXYGEN SATURATION: 95 % | HEIGHT: 68 IN | DIASTOLIC BLOOD PRESSURE: 72 MMHG | BODY MASS INDEX: 30.2 KG/M2 | HEART RATE: 100 BPM

## 2021-05-05 LAB
ALBUMIN SERPL-MCNC: 3.3 G/DL (ref 3.4–5)
ANION GAP SERPL CALCULATED.3IONS-SCNC: 8 MMOL/L (ref 3–16)
BASOPHILS ABSOLUTE: 0 K/UL (ref 0–0.2)
BASOPHILS RELATIVE PERCENT: 0.5 %
BUN BLDV-MCNC: 32 MG/DL (ref 7–20)
CALCIUM SERPL-MCNC: 8.7 MG/DL (ref 8.3–10.6)
CHLORIDE BLD-SCNC: 92 MMOL/L (ref 99–110)
CO2: 33 MMOL/L (ref 21–32)
CREAT SERPL-MCNC: 2 MG/DL (ref 0.6–1.2)
EOSINOPHILS ABSOLUTE: 0.4 K/UL (ref 0–0.6)
EOSINOPHILS RELATIVE PERCENT: 6 %
GFR AFRICAN AMERICAN: 29
GFR NON-AFRICAN AMERICAN: 24
GLUCOSE BLD-MCNC: 126 MG/DL (ref 70–99)
GLUCOSE BLD-MCNC: 144 MG/DL (ref 70–99)
GLUCOSE BLD-MCNC: 177 MG/DL (ref 70–99)
HCT VFR BLD CALC: 31.2 % (ref 36–48)
HEMOGLOBIN: 10.2 G/DL (ref 12–16)
INR BLD: 1.11 (ref 0.86–1.14)
LYMPHOCYTES ABSOLUTE: 0.9 K/UL (ref 1–5.1)
LYMPHOCYTES RELATIVE PERCENT: 13.2 %
MAGNESIUM: 2.4 MG/DL (ref 1.8–2.4)
MCH RBC QN AUTO: 27.6 PG (ref 26–34)
MCHC RBC AUTO-ENTMCNC: 32.7 G/DL (ref 31–36)
MCV RBC AUTO: 84.5 FL (ref 80–100)
MONOCYTES ABSOLUTE: 0.6 K/UL (ref 0–1.3)
MONOCYTES RELATIVE PERCENT: 9.2 %
NEUTROPHILS ABSOLUTE: 4.8 K/UL (ref 1.7–7.7)
NEUTROPHILS RELATIVE PERCENT: 71.1 %
PDW BLD-RTO: 17.3 % (ref 12.4–15.4)
PERFORMED ON: ABNORMAL
PERFORMED ON: ABNORMAL
PHOSPHORUS: 4 MG/DL (ref 2.5–4.9)
PLATELET # BLD: 271 K/UL (ref 135–450)
PMV BLD AUTO: 7.5 FL (ref 5–10.5)
POTASSIUM SERPL-SCNC: 3.7 MMOL/L (ref 3.5–5.1)
PRO-BNP: 5182 PG/ML (ref 0–449)
PROTHROMBIN TIME: 12.9 SEC (ref 10–13.2)
RBC # BLD: 3.7 M/UL (ref 4–5.2)
SODIUM BLD-SCNC: 133 MMOL/L (ref 136–145)
WBC # BLD: 6.7 K/UL (ref 4–11)

## 2021-05-05 PROCEDURE — 6370000000 HC RX 637 (ALT 250 FOR IP): Performed by: NURSE PRACTITIONER

## 2021-05-05 PROCEDURE — 6370000000 HC RX 637 (ALT 250 FOR IP): Performed by: INTERNAL MEDICINE

## 2021-05-05 PROCEDURE — 6360000002 HC RX W HCPCS: Performed by: NURSE PRACTITIONER

## 2021-05-05 PROCEDURE — 83880 ASSAY OF NATRIURETIC PEPTIDE: CPT

## 2021-05-05 PROCEDURE — 80069 RENAL FUNCTION PANEL: CPT

## 2021-05-05 PROCEDURE — 99233 SBSQ HOSP IP/OBS HIGH 50: CPT | Performed by: INTERNAL MEDICINE

## 2021-05-05 PROCEDURE — 85025 COMPLETE CBC W/AUTO DIFF WBC: CPT

## 2021-05-05 PROCEDURE — 36592 COLLECT BLOOD FROM PICC: CPT

## 2021-05-05 PROCEDURE — 94760 N-INVAS EAR/PLS OXIMETRY 1: CPT

## 2021-05-05 PROCEDURE — 85610 PROTHROMBIN TIME: CPT

## 2021-05-05 PROCEDURE — 2580000003 HC RX 258: Performed by: INTERNAL MEDICINE

## 2021-05-05 PROCEDURE — 83735 ASSAY OF MAGNESIUM: CPT

## 2021-05-05 PROCEDURE — 99232 SBSQ HOSP IP/OBS MODERATE 35: CPT | Performed by: CLINICAL NURSE SPECIALIST

## 2021-05-05 PROCEDURE — 6360000002 HC RX W HCPCS

## 2021-05-05 RX ORDER — FUROSEMIDE 10 MG/ML
INJECTION INTRAMUSCULAR; INTRAVENOUS
Status: COMPLETED
Start: 2021-05-05 | End: 2021-05-05

## 2021-05-05 RX ORDER — FUROSEMIDE 10 MG/ML
INJECTION INTRAMUSCULAR; INTRAVENOUS
Status: DISCONTINUED
Start: 2021-05-05 | End: 2021-05-05 | Stop reason: HOSPADM

## 2021-05-05 RX ORDER — FUROSEMIDE 10 MG/ML
40 INJECTION INTRAMUSCULAR; INTRAVENOUS ONCE
Status: COMPLETED | OUTPATIENT
Start: 2021-05-05 | End: 2021-05-05

## 2021-05-05 RX ORDER — SPIRONOLACTONE 25 MG/1
12.5 TABLET ORAL DAILY
Status: DISCONTINUED | OUTPATIENT
Start: 2021-05-05 | End: 2021-05-05

## 2021-05-05 RX ORDER — AMIODARONE HYDROCHLORIDE 200 MG/1
200 TABLET ORAL 2 TIMES DAILY
Status: DISCONTINUED | OUTPATIENT
Start: 2021-05-05 | End: 2021-05-05 | Stop reason: HOSPADM

## 2021-05-05 RX ORDER — SPIRONOLACTONE 25 MG/1
25 TABLET ORAL DAILY
Status: DISCONTINUED | OUTPATIENT
Start: 2021-05-06 | End: 2021-05-05 | Stop reason: HOSPADM

## 2021-05-05 RX ORDER — AMIODARONE HYDROCHLORIDE 200 MG/1
200 TABLET ORAL 2 TIMES DAILY
Qty: 30 TABLET | Refills: 1 | Status: SHIPPED | OUTPATIENT
Start: 2021-05-05 | End: 2021-05-19 | Stop reason: SDUPTHER

## 2021-05-05 RX ADMIN — FUROSEMIDE 40 MG: 10 INJECTION, SOLUTION INTRAMUSCULAR; INTRAVENOUS at 09:33

## 2021-05-05 RX ADMIN — FUROSEMIDE 40 MG: 10 INJECTION INTRAMUSCULAR; INTRAVENOUS at 09:33

## 2021-05-05 RX ADMIN — NAPHAZOLINE HYDROCHLORIDE AND PHENIRAMINE MALEATE 1 DROP: .25; 3 SOLUTION/ DROPS OPHTHALMIC at 09:32

## 2021-05-05 RX ADMIN — SPIRONOLACTONE 12.5 MG: 25 TABLET ORAL at 09:43

## 2021-05-05 RX ADMIN — INSULIN LISPRO 2 UNITS: 100 INJECTION, SOLUTION INTRAVENOUS; SUBCUTANEOUS at 09:36

## 2021-05-05 RX ADMIN — OXYCODONE 5 MG: 5 TABLET ORAL at 04:17

## 2021-05-05 RX ADMIN — PANTOPRAZOLE SODIUM 40 MG: 40 TABLET, DELAYED RELEASE ORAL at 06:22

## 2021-05-05 RX ADMIN — OXYCODONE 5 MG: 5 TABLET ORAL at 11:54

## 2021-05-05 RX ADMIN — ENOXAPARIN SODIUM 90 MG: 100 INJECTION SUBCUTANEOUS at 09:33

## 2021-05-05 RX ADMIN — TORSEMIDE 50 MG: 100 TABLET ORAL at 09:31

## 2021-05-05 RX ADMIN — POLYETHYLENE GLYCOL 3350 17 G: 17 POWDER, FOR SOLUTION ORAL at 09:32

## 2021-05-05 RX ADMIN — ASPIRIN 81 MG: 81 TABLET, CHEWABLE ORAL at 09:31

## 2021-05-05 RX ADMIN — INSULIN LISPRO 2 UNITS: 100 INJECTION, SOLUTION INTRAVENOUS; SUBCUTANEOUS at 11:55

## 2021-05-05 RX ADMIN — OXYCODONE AND ACETAMINOPHEN 1 TABLET: 325; 10 TABLET ORAL at 16:00

## 2021-05-05 RX ADMIN — LOSARTAN POTASSIUM 25 MG: 25 TABLET, FILM COATED ORAL at 09:31

## 2021-05-05 RX ADMIN — AMIODARONE HYDROCHLORIDE 200 MG: 200 TABLET ORAL at 09:31

## 2021-05-05 RX ADMIN — Medication 10 ML: at 09:34

## 2021-05-05 RX ADMIN — METOPROLOL SUCCINATE 50 MG: 50 TABLET, EXTENDED RELEASE ORAL at 09:31

## 2021-05-05 ASSESSMENT — PAIN SCALES - GENERAL
PAINLEVEL_OUTOF10: 4
PAINLEVEL_OUTOF10: 0
PAINLEVEL_OUTOF10: 6
PAINLEVEL_OUTOF10: 4
PAINLEVEL_OUTOF10: 6
PAINLEVEL_OUTOF10: 0
PAINLEVEL_OUTOF10: 0

## 2021-05-05 ASSESSMENT — PAIN DESCRIPTION - FREQUENCY
FREQUENCY: CONTINUOUS

## 2021-05-05 ASSESSMENT — PAIN DESCRIPTION - LOCATION
LOCATION: BACK
LOCATION: BACK

## 2021-05-05 ASSESSMENT — PAIN DESCRIPTION - PAIN TYPE
TYPE: CHRONIC PAIN

## 2021-05-05 ASSESSMENT — PAIN DESCRIPTION - ONSET
ONSET: ON-GOING
ONSET: ON-GOING

## 2021-05-05 ASSESSMENT — PAIN DESCRIPTION - PROGRESSION
CLINICAL_PROGRESSION: NOT CHANGED

## 2021-05-05 ASSESSMENT — PAIN DESCRIPTION - ORIENTATION
ORIENTATION: RIGHT;LEFT
ORIENTATION: UPPER
ORIENTATION: UPPER

## 2021-05-05 ASSESSMENT — PAIN DESCRIPTION - DESCRIPTORS
DESCRIPTORS: ACHING
DESCRIPTORS: ACHING

## 2021-05-05 NOTE — PROGRESS NOTES
[facility]   Electrophysiology Progress Note     Admit Date: 4/15/2021     Reason for follow up: Atrial fibrillation     HPI and Interval History: 77 y.o. female presented with shortness of breath, atrial fibrillation. She has a very complex past medical history including coronary artery disease status post CABG x3, status post bioprosthetic mitral valve replacement, WAYNE clip on 8/2018, atrial fibrillation status post maze in 2018, cardiomyopathy with severe LV dysfunction ejection fraction of 25 to 30%, DVT/PE on Coumadin. She also had atrial fibrillation ablation in 10/2019 and ILR placed. She was treated with IV milrinone on admission and Lasix drip. She has diuresed well. IV milrinone has been stopped and Lasix drips was also stopped. She has moderate aortic stenosis and underwent JUDE by interventional cardiology to assess the valve and does not appear to have severe aortic valve stenosis at this time therefore no intervention was recommended. She was loaded with amiodarone and then cardioversion was initially attempted which failed. On 5/4/2021 after JUDE cardioversion was done which was successful. Overnight she has had episodes of her rate is now controlled. Atrial fibrillation. She is in sinus rhythm intermittently. Patient seen and examined. Clinical notes reviewed. Telemetry reviewed. No new complaint today. No major events overnight. Denies having chest pain, shortness of breath, dyspnea on exertion, Orthopnea, PND at the time of this visit. Assessment and plan:   -Persistent atrial fibrillation   Status post cardioversion on 5/4/2021   She has had intermittent PAT/PAF on monitor. She now in sinus rhythm. Rates are controlled. We will check amiodarone level. Increase amiodarone to 200 twice daily. Continue with metoprolol. Needs close follow-up to monitor toxicity. Will be checking liver enzymes and thyroid functions.    ALT 10, AST 15, TSH 1.67 on 4/15/2021     High HVA1AY4-CYZw score. High risk for stroke and thromboembolism. She is on warfarin. Also reviewed her last JUDE. There is a small stump of the left atrial appendage remnant. She needs anticoagulation for her history of DVT and PE. In light of having small stump of left atrial appendage and spontaneous echo contrast seen, she needs to be on anticoagulation. She is on twice daily Lovenox dose. She is also on warfarin. Pharmacy to dose Coumadin. Goal INR 2-3. Monitor INR. -Severe ischemic cardiomyopathy with severe LV dysfunction. Patient appears to be diuresing well. She is off milrinone. Follow-up with heart failure team.   Continue with Toprol-XL   Not on ACE/ARB due to chronic kidney disease   Continue with spironolactone   Torsemide    We have discussed AICD implantation in the past.  We have discussed risk-benefit and alternatives. We will follow up the patient as outpatient after her condition stabilized. -Moderate aortic stenosis   Needs follow-up. Status post recent JUDE does not appears to be severe aortic stenosis. - CKD    Follow up with nephrology. Monitor Cr and electrolytes. - CAD s/p CABG   On medical therapy    Stable       Discussed with nursing staff.        Active Hospital Problems    Diagnosis Date Noted    S/P CABG x 3 [Z95.1] 08/22/2018     Priority: Low    PAF (paroxysmal atrial fibrillation) ([facility]) [I48.0]      Priority: Low    Type 2 diabetes mellitus with hyperglycemia, with long-term current use of insulin ([facility]) [E11.65, Z79.4] 04/27/2017     Priority: Low    Chronic diastolic heart failure ([facility]) [I50.32]     Hypokalemia [E87.6]     Acute on chronic systolic CHF (congestive heart failure) ([facility]) [I50.23] 04/26/2021    Hypoglycemia [E16.2] 04/26/2021    Atrial fibrillation with rapid ventricular response ([facility]) [I48.91]     Chest pain [R07.9]     Dyspnea on exertion [R06.00]     Acute kidney injury superimposed on CKD ([facility] [address] [N17.9, Oropharynx is clear and moist.   · Eyes: Conjunctivae normal. EOM are normal.   · Neck: Neck supple. No JVD present. · Cardiovascular: Normal rate, regular rhythm, S1&S2. S3  · Pulmonary/Chest: Bilateral respiratory sounds. No rhonchi. · Abdominal: Soft. No tenderness. · Musculoskeletal: No tenderness. Trace edema    · Lymphadenopathy: Has no cervical adenopathy. · Neurological: Alert and oriented. Follows command, No Gross deficit   · Skin: Skin is warm, No rash noted. · Psychiatric: Has a normal behavior     Scheduled Meds:   furosemide        [START ON 5/6/2021] spironolactone  25 mg Oral Daily    metoprolol succinate  50 mg Oral Daily    torsemide  50 mg Oral BID    enoxaparin  1 mg/kg Subcutaneous BID    warfarin  5 mg Oral Daily    losartan  25 mg Oral Daily    insulin glargine  10 Units Subcutaneous Nightly    amiodarone  200 mg Oral Daily    naphazoline-pheniramine  1 drop Both Eyes BID    polyethylene glycol  17 g Oral BID    aspirin  81 mg Oral Daily    gabapentin  300 mg Oral Nightly    montelukast  10 mg Oral Nightly    pantoprazole  40 mg Oral QAM AC    sodium chloride flush  5-40 mL Intravenous 2 times per day    insulin lispro  0-12 Units Subcutaneous TID WC    insulin lispro  0-6 Units Subcutaneous Nightly     Continuous Infusions:   sodium chloride 25 mL (04/30/21 1015)    dextrose       PRN Meds:sodium chloride, potassium chloride, bisacodyl, diphenhydrAMINE, oxyCODONE-acetaminophen, sodium chloride flush, oxyCODONE, albuterol, promethazine **OR** ondansetron, polyethylene glycol, acetaminophen **OR** acetaminophen, glucose, dextrose, glucagon (rDNA), dextrose     Prior to Admission medications    Medication Sig Start Date End Date Taking?  Authorizing Provider   amiodarone (CORDARONE) 200 MG tablet Take 1 tablet by mouth daily 5/5/21  Yes Reese Cason MD   losartan (COZAAR) 25 MG tablet Take 1 tablet by mouth daily 5/5/21  Yes Reese Cason MD   metoprolol succinate (TOPROL XL) 50 MG extended release tablet Take 1 tablet by mouth daily 5/5/21  Yes Laqueta Gosselin, MD   spironolactone (ALDACTONE) 25 MG tablet Take 1 tablet by mouth daily 5/5/21  Yes Laqueta Gosselin, MD   torsemide (DEMADEX) 10 MG tablet Take 5 tablets by mouth 2 times daily 5/4/21  Yes Laqueta Gosselin, MD   enoxaparin (LOVENOX) 100 MG/ML injection Inject 0.9 mLs into the skin 2 times daily 5/4/21  Yes Laqueta Gosselin, MD   gabapentin (NEURONTIN) 300 MG capsule Take 1 capsule by mouth nightly for 90 days. Intended supply: 30 days 3/12/21 6/10/21 Yes Marylene Hartigan, MD   insulin glargine (LANTUS SOLOSTAR) 100 UNIT/ML injection pen INJECT 26 UNITS IN THE MORNING AND 18 UNITS AT BEDTIME 3/10/21  Yes Elizabeth Sarmiento MD   exenatide (BYETTA 10 MCG PEN) 10 MCG/0.04ML injection Inject 0.04 mLs into the skin 2 times daily (with meals) 3/3/21  Yes Marylene Hartigan, MD   OXYCODONE HCL PO Take 10 mg by mouth As of 1/21/2021,  states patient is taking 10mg with edge being taken off her pain after 3 hours.    Yes Historical Provider, MD   ACETAMINOPHEN PO Take 500 mg by mouth every 6 hours as needed    Yes Historical Provider, MD   albuterol sulfate  (90 Base) MCG/ACT inhaler USE 2 INHALATIONS EVERY 6 HOURS AS NEEDED FOR WHEEZING 11/17/20  Yes Marylene Hartigan, MD   albuterol (PROVENTIL) (2.5 MG/3ML) 0.083% nebulizer solution Take 3 mLs by nebulization every 6 hours as needed for Wheezing 6/2/20  Yes Marylene Hartigan, MD   polyethylene glycol (GLYCOLAX) 17 GM/SCOOP powder Take 17 g by mouth every other day    Yes Historical Provider, MD   omeprazole (PRILOSEC) 20 MG delayed release capsule Take 20 mg by mouth daily   Yes Historical Provider, MD   ondansetron (ZOFRAN) 4 MG tablet Take 1 tablet by mouth 3 times daily as needed for Nausea or Vomiting 3/26/20  Yes Marylene Hartigan, MD   aspirin 81 MG chewable tablet Take 1 tablet by mouth daily 6/7/19  Yes Marylene Hartigan, MD   vitamin B-12 500 MCG tablet Device interrogation, Holter, CT scan. Complex medical condition with multiple medical problems affecting prognosis and outcome of EP interventions  Severe exacerbation of underlying medical condition requiring hospitalization and at risk of decompensation. I have spent 35 minutes of face to face time with the patient with more than 50% spent counseling and coordinating care for this patient    Thank you for allowing me to participate in the care of Adrián Odonnell     All questions and concerns were addressed to the patient/family. Alternatives to my treatment were discussed. I have discussed the above stated plan and the patient verbalized understanding and agreed with the plan. NOTE: This report was transcribed using voice recognition software. Every effort was made to ensure accuracy, however, inadvertent computerized transcription errors may be present.      Akil Childers MD, MPH  [facility]   Office: [phone]  Fax: [phone]

## 2021-05-05 NOTE — PROGRESS NOTES
CLINICAL PHARMACY NOTE: MEDS TO [address] Select Patient?: Yes  Total # of Prescriptions Filled: 5   The following medications were delivered to the patient:  · Losartan 25 mg  · Metoprolol ER 50 mg  · Amiodarone 200 mg  · Torsemide 20 mg  · Enoxaparin 100mg/ml  Total # of Interventions Completed: 1  Time Spent (min): 45    Additional Documentation:    Delivered to Patient   Also explained to Nurse and Patient  Didn't have Torsemide 10 in stock, gave patient Torsemide 20 mg instead and have patient taking 2 1/2 tablets twice daily  Also let Nurse and patient  know spironolactone too soon until 6/19  Solange Templeton CPhT

## 2021-05-05 NOTE — CARE COORDINATION
Jesus 79  Orders noted,CM met with patient and spouse regarding d/c planning needs. Patient states has  had skilled  hcc before for dressing change but cannot recall the agency. Agreeable to an agency that  is in Network with her insurance. Referral was called to [facility] and was accepted,will follow up the patient on discharge.

## 2021-05-05 NOTE — CARE COORDINATION
The Plan for Transition of Care is related to the following treatment goals: skilled home care    The Patient and/or patient representative -pt & spouse was provided with a choice of provider and agrees   with the discharge plan. [x] Yes [] No    Freedom of choice list was provided with basic dialogue that supports the patient's individualized plan of care/goals, treatment preferences and shares the quality data associated with the providers.  [x] Yes [] No

## 2021-05-05 NOTE — DISCHARGE INSTR - COC
Continuity of Care Form    CHF PATHWAY    Systolic/Diastolic    EF: 19%    _x_ Daily Visits x 3     _x_Cardiovascular Assessment. Titrate O2 to keep SaO2 greater than 90%    _x_ Daily Weights- Baseline Wt: 199 lbs  Call MD if:   3 pound weight gain or loss in one day OR 5 pound weight gain in one week       _x_ Labs:   BMP,BNP     Please start on 5/10   Frequency: Weekly x 4              Fax results to: CHF Clinic: [phone]    _x_ Med List attached:   Hold Coreg/Metoprolol if HR less than 45 or patient symptomatic*   Hold ACE/ARB if SBP less than 85 or patient symptomatic*   Do not hold Spironolactone (aldactone) for hypotension/bradycardia   Call MD for questions: CHF Clinic: [phone]    _x_Follow up appointment with cardiology: date/time:  5/10 with Dr. Clover Ashley      _x_ [address] for home health  And telehealth monitoring. For CHF/COPD.        Patient Name: Anthony Owusu   :  1945  MRN:  [id number]    Admit date:  4/15/2021  Discharge date:  21    Code Status Order: Full Code   Advance Directives:   [facility] Documentation       Date/Time Healthcare Directive Type of Healthcare Directive Copy in [facility] [address] Agent's Name Healthcare Agent's Phone Number    [phone]  No, patient does not have an advance directive for healthcare treatment -- -- -- -- --            Admitting Physician:  Lisa Li MD  PCP: Margy Hinojosa MD    Discharging Nurse: [facility] Unit/Room#: 5MW-3830/1539-31  Discharging Unit Phone Number: [phone]    Emergency Contact:   Extended Emergency Contact Information  Primary Emergency Contact: Khadijah Atkins  Address: [address] [place] [address]  Home Phone: [phone]  Mobile Phone: [phone]  Relation: Spouse  Secondary Emergency Contact: Sola Mac[address] of [address] [place] [address] Phone: [phone]  Mobile Phone: [phone]  Relation: Child    Past Surgical History:  Past Surgical History:   Procedure Laterality Date    BRONCHOSCOPY  07/18/2016    Dr. Chandrika Burch - brushings from [facility],Mc42-10  08/16/2018    Dr. Nichole Solitario  02/07/2017    Dr. Lynda Jacobs - sigmoid diverticulosis, polypectomies x3    COLONOSCOPY  01/17/2014    Dr. Lynda Jacobs - sigmoid diverticulosis, polypectomies x3, internal hemorrhoids    COLONOSCOPY  10/10/2018    w/biopsy performed by Regino Holguin MD at [address] N/A 8/7/2020    COLONOSCOPY DIAGNOSTIC performed by Chandrika Burch MD at [address]  08/21/2018    Dr. Holland Reese - x3 (LIMA-LAD, L SV-D1-PLV) modified BL MAZE procedure w/obliteration of WAYNE using 45mm AtriClip    HYSTERECTOMY      INSERTABLE CARDIAC MONITOR Left 08/16/2018    Dr. Andrei Quintanilla - Gene Lade SN# [id number] [employer]    MITRAL VALVE REPLACEMENT  08/21/2018    Dr. Holland Reese - [facility] tissue valve    PAIN MANAGEMENT PROCEDURE N/A 12/18/2020    C6-C7 MIDLINE  EPIDURAL STEROID INJECTION WITH FLUOROSCOPY performed by Tony Parks MD at [address] Bilateral 1/18/2021    BILATERAL T11 TRANSFORAMINAL EPIDURAL STEROID INJECTION WITH FLUOROSCOPY performed by Tony Parks MD at [address] TRANSESOPHAGEAL ECHOCARDIOGRAM  08/21/2018    during CABG/MVR    TUNNELED VENOUS CATHETER PLACEMENT Left 08/23/2018    Dr. Jayshree Ochoa -  for HD---since removed    UPPER GASTROINTESTINAL ENDOSCOPY N/A 10/10/2018    w/biopsy performed by Regino Holguin MD at [address] [facility] ENDOSCOPY       Immunization History:   Immunization History   Administered Date(s) Administered    COVID-19, Moderna, PF, 100mcg/0.5mL 01/31/2021, 03/15/2021    Influenza Virus Vaccine 09/26/2012, 12/15/2014, 12/16/2015, 12/27/2016    Influenza Whole 09/26/2012    Influenza, Quadv, 6-35 months, IM, PF (Fluzone, Afluria) 12/18/2018, 09/23/2020    Influenza, Aldean Cheese, IM, (6 mo and older Fluzone, Flulaval, Fluarix and 3 yrs and older Afluria) 12/27/2016    Pneumococcal Conjugate 13-valent ([id number]) 04/15/2015    Pneumococcal Polysaccharide ([id number]) 04/28/2016       Active Problems:  Patient Active Problem List   Diagnosis Code    Long term current use of anticoagulant Z79.01    Hypercholesteremia E78.00    Generalized osteoarthrosis, involving multiple sites B69.1    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    Paroxysmal SVT (supraventricular tachycardia) ([facility]) I47.1    B12 deficiency E53.8    History of pulmonary embolism Z86.711    Multiple pulmonary nodules R91.8    Type 2 diabetes mellitus with hyperglycemia, with long-term current use of insulin ([facility]) E11.65, Z79.4    Asthma J45.909    Pneumoperitoneum K66.8    PAF (paroxysmal atrial fibrillation) ([facility]) I48.0    Hypertension I10    Cardiac arrhythmia I49.9    Encounter for loop recorder check Z45.09    Coronary artery disease due to lipid rich plaque I25.10, I25.83    Nonrheumatic mitral valve regurgitation I34.0    S/P CABG x 3 Z95.1    Goiter E04.9    Primary osteoarthritis of both knees M17.0    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    Obesity, Class I, BMI 30-34.9 E66.9    Chronic systolic CHF (congestive heart failure), NYHA class 3 ([facility]) I50.22    Thoracic degenerative disc disease M51.34    Persistent atrial fibrillation ([facility]) I48.19    S/P MVR (mitral valve repair) Z98.890    Ischemic cardiomyopathy I25.5    Occlusion and stenosis of bilateral carotid arteries I65.23    Pneumatosis intestinalis of small intestine K63.89    Aortic valve stenosis I35.0    Deep vein thrombosis (DVT) of non-extremity vein I82.90    Acute on chronic systolic heart failure due to valvular disease ([facility]) I50.23, I38    Stage 3 chronic kidney disease N18.30    Coronary artery disease involving native heart without angina pectoris I25.10    Hypotension I95.9    Atrial fibrillation with rapid ventricular response ([facility]) I48.91    Chest pain R07.9    Dyspnea on exertion R06.00    Acute kidney injury superimposed on CKD ([facility]) N17.9, N18.9    Acute on chronic systolic CHF (congestive heart failure) ([facility]) I50.23    Hypoglycemia E16.2    Chronic diastolic heart failure ([facility]) I50.32    Hypokalemia E87.6       Isolation/Infection:   Isolation            No Isolation          Patient Infection Status       Infection Onset Added Last Indicated Last Indicated By Review Planned Expiration Resolved Resolved By    None active    Resolved    C-diff Rule Out 05/17/20 05/17/20 05/17/20 Clostridium difficile toxin/antigen (Ordered)   08/07/20 Esetfania Schmitz RN            Nurse Assessment:  Last Vital Signs: /72   Pulse 108   Temp 97.5 °F (36.4 °C) (Temporal)   Resp 16   Ht 5' 8\" (1.727 m)   Wt 199 lb 4 oz (90.4 kg)   SpO2 95%   BMI 30.30 kg/m²     Last documented pain score (0-10 scale): Pain Level: 4  Last Weight:   Wt Readings from Last 1 Encounters:   05/05/21 199 lb 4 oz (90.4 kg)     Mental Status:  oriented and alert    IV Access:  - None    Nursing Mobility/ADLs:  Walking   Independent  Transfer  Independent  Bathing  Assisted  Dressing  Independent  Toileting  Independent  Feeding  Independent  Med Admin  Independent  Med Delivery   whole    Wound Care Documentation and Therapy:        Elimination:  Continence:   · Bowel: Yes  · Bladder: Yes  Urinary Catheter: None   Colostomy/Ileostomy/Ileal Conduit: No       Date of Last BM:     Intake/Output Summary (Last 24 hours) at 5/5/2021 1319  Last data filed at 5/5/2021 0845  Gross per 24 hour   Intake 960 ml   Output --   Net 960 ml     I/O last 3 completed shifts:   In: 5 [P.O.:720]  Out: -     Safety Concerns:     None    Impairments/Disabilities:      Vision    Nutrition Therapy:  Current Nutrition Therapy:   - Oral Diet:  Carb Control 5 carbs/meal (2000kcals/day)    Routes of Feeding: Oral  Liquids: No Restrictions  Daily Fluid Restriction: no  Last Modified Barium Swallow with Video (Video Swallowing Test): not done    Treatments at the Time of Hospital Discharge:   Respiratory Treatments:   Oxygen Therapy:  is not on home oxygen therapy. Ventilator:    - No ventilator support    Rehab Therapies: Physical Therapy and Occupational Therapy  Weight Bearing Status/Restrictions: No weight bearing restirctions  Other Medical Equipment (for information only, NOT a DME order):  cane  Other Treatments:     Patient's personal belongings (please select all that are sent with patient):  Sara    RN SIGNATURE:  {Esignature:[id number]:::0}    CASE MANAGEMENT/SOCIAL WORK SECTION    Inpatient Status Date: ***    Readmission Risk Assessment Score:  Readmission Risk              Risk of Unplanned Readmission:        44           Discharging to Facility/ Agency   Name: [facility]  PHONE; [phone]  · FAX: [phone]  · Address:  · Phone:  · Fax:    Dialysis Facility (if applicable)   · Name:  · Address:  · Dialysis Schedule:  · Phone:  · Fax:    / signature: Electronically signed by Dede Mayfield RN on 5/5/21 at 2:28 PM EDT    PHYSICIAN SECTION    Prognosis: Good    Condition at Discharge: Stable    Rehab Potential (if transferring to Rehab): Good    Recommended Labs or Other Treatments After Discharge: as per home health     Physician Certification: I certify the above information and transfer of Fatoumata Vargas  is necessary for the continuing treatment of the diagnosis listed and that she requires [address] [place] [address] for greater 30 days.      Update Admission H&P: No change in H&P    PHYSICIAN SIGNATURE:  Electronically signed by Reese Cason MD on 5/5/21 at 1:19 PM EDT

## 2021-05-05 NOTE — PROGRESS NOTES
Nephrology Attending  Progress Note        SUMMARY :  We are following this patient for TORIBIO on CKD stage 3   77 y.o. female who yesterday night started to have midsternal chest pain that was 7 out of 10 pressure-like associated with palpitations heart rate was up to 140s. Was short of breath no diaphoresis some nausea no vomiting. Patient came to the ED was in A. fib with RVR.      Interval History (Chart/Data reviewed):   -pt seen and examined  -PMSHx and meds reviewed  -No family at bedside    S/p JUDE/CV 5/4-AV area adequate  Back in A.fib this am  Scr higher-off milrinone      ROS: neg chest pain/SOB/fever/chills         Physical Exam:    VITALS:  BP 98/61   Pulse 114   Temp 97.4 °F (36.3 °C) (Temporal)   Resp 16   Ht 5' 8\" (1.727 m)   Wt 198 lb 3.2 oz (89.9 kg)   SpO2 94%   BMI 30.14 kg/m²   BLOOD PRESSURE RANGE:  Systolic (07SER), KIW:918 , Min:89 , GUM:884   ; Diastolic (40AMS), FFS:50, Min:54, Max:83    24HR INTAKE/OUTPUT:      Intake/Output Summary (Last 24 hours) at 5/5/2021 0916  Last data filed at 5/5/2021 0016  Gross per 24 hour   Intake 720 ml   Output --   Net 720 ml   O > I       Gen:  alert, oriented x 3  Anicteric, NC/AT  CV: IRRR , Gr 3/6 systolic murmur heard all over precordium , especially at base of heart  Lungs:B/ L air entry, Normal breath sounds ,  No crackles   Abd: soft, bowel sounds + , No organomegaly   Ext: trace to 1+ edema, no cyanosis, erythema + Shins  Neuro: nonfocal.      DATA:    CBC with Differential:    Lab Results   Component Value Date    WBC 6.7 05/05/2021    RBC 3.70 05/05/2021    HGB 10.2 05/05/2021    HCT 31.2 05/05/2021     05/05/2021    MCV 84.5 05/05/2021    MCH 27.6 05/05/2021    MCHC 32.7 05/05/2021    RDW 17.3 05/05/2021    SEGSPCT 64.1 09/02/2020    BANDSPCT 1 01/14/2019    LYMPHOPCT 13.2 05/05/2021    MONOPCT 9.2 05/05/2021    BASOPCT 0.5 05/05/2021    MONOSABS 0.6 05/05/2021    LYMPHSABS 0.9 05/05/2021    EOSABS 0.4 05/05/2021    BASOSABS 0.0 05/05/2021     CMP:    Lab Results   Component Value Date     05/05/2021    K 3.7 05/05/2021    K 3.5 04/21/2021    CL 92 05/05/2021    CO2 33 05/05/2021    BUN 32 05/05/2021    CREATININE 2.0 05/05/2021    GFRAA 29 05/05/2021    AGRATIO 0.8 04/15/2021    LABGLOM 24 05/05/2021    LABGLOM 45 12/06/2018    GLUCOSE 126 05/05/2021    PROT 6.6 04/15/2021    LABALBU 3.3 05/05/2021    CALCIUM 8.7 05/05/2021    BILITOT 0.6 04/15/2021    ALKPHOS 215 04/15/2021    AST 15 04/15/2021    ALT 10 04/15/2021     Magnesium:    Lab Results   Component Value Date    MG 2.40 05/05/2021     Phosphorus:    Lab Results   Component Value Date    PHOS 4.0 05/05/2021     Troponin:    Lab Results   Component Value Date    TROPONINI 0.02 04/15/2021     U/A:    Lab Results   Component Value Date    COLORU Yellow 04/15/2021    PROTEINU Negative 04/15/2021    PHUR 6.5 04/15/2021    WBCUA 3 04/15/2021    RBCUA 2 04/15/2021    YEAST neg 02/12/2021    BACTERIA trace 02/12/2021    BACTERIA 1+ 05/11/2020    CLARITYU Clear 04/15/2021    SPECGRAV 1.020 04/15/2021    LEUKOCYTESUR Negative 04/15/2021    UROBILINOGEN 0.2 04/15/2021    BILIRUBINUR Negative 04/15/2021    BLOODU TRACE-INTACT 04/15/2021    GLUCOSEU Negative 04/15/2021         IMPRESSION/RECOMMENDATIONS:      Diagnosis and Plan     TORIBIO: baseline SCr is 1.2-1.6 range  -recurrent after initial improvement-multifactorial-recent contrast/rhtymn/RASI/CRS-off milrinone  -Cr is 2.0<--1.6   -given severe CM will need to accept higher Cr to maximize HF therapy  -given relatively low BP due to tachycardia, will lower dose of aldactone  -give IV lasix today    Hypokalemia   -replaced  Hyponatremia: hypervolemic-give lasix IV today    CKD stage 3b:  Presumptive  diabetic nephropathy, however no proteinuria   -baseline Scr is 1.2-1.6-varies due to recurrent TORIBIO on CKD  -sees Dr. Savannah Mclean  -on ARB/aldactone     HTN: stable on aldactone/ARB/BB    A. fib with RVR: HR is higher today  -per EP    HFrEF: acute on chronic exacerbation, recent EF 10 to 15%  -s/p LHC-patent grafts  -started on aldactone/ARB/beta-blocker  -back on diuretics  -? If she needs milrinone    AR: s/p CV on 5/4-back in a.fib    Cellulitis LE; likely strep/folliculitis. Ceftriaxone     AS: s/p JUDE/AS not severe    Thank you for allowing us to participate in the care of this patient. Please call with any questions.     Signed:  Ernst Sim M.D.   [facility]  Office Number: [phone]  Fax Number: [phone]  5/5/2021, 9:16 AM

## 2021-05-05 NOTE — PLAN OF CARE
Problem: Respiratory  Goal: O2 Sat > 90%  Outcome: Met This Shift     Problem: Pain:  Goal: Control of acute pain  Description: Control of acute pain  Outcome: Ongoing  Note:    Pt c/o pain. Pt assessed for breathing and BP. Pt educated on pain scale. Medication administered, see MAR. Pt repositioned. Stimuli reduced. Rest promoted. Pain reassessed. Will continue to monitor. Problem: Cardiac Output - Decreased:  Goal: Hemodynamic stability will improve  Description: Hemodynamic stability will improve  Outcome: Ongoing  Note: Pt taken off milrinone gtt. Started on torsemide. Soft Bps overnight. Will monitor.

## 2021-05-05 NOTE — FLOWSHEET NOTE
05/05/21 0845   Vital Signs   Temp 97.4 °F (36.3 °C)   Temp Source Temporal   Pulse 114   Heart Rate Source Brachial   Resp 16   BP 98/61   BP Location Right upper arm   MAP (mmHg) 73   Patient Position Sitting   Level of Consciousness Alert (0)   MEWS Score 4   Patient Currently in Pain Denies   Pain Assessment   Pain Assessment 0-10   Pain Level 0   Clinical Progression Not changed   Non-Pharmaceutical Pain Intervention(s) Declines   Response to Pain Intervention Patient Satisfied   Oxygen Therapy   SpO2 94 %   Pulse Oximeter Device Mode Intermittent   Pulse Oximeter Device Location Finger   O2 Device None (Room air)     Shift assessment complete. VSS. Pt. Is alert and oriented resting comfortably in bed. Pt. Has no compliant of chest pain or SOB at this time. POC discussed with patient and patient states understanding and is in agreement with plan. Call light and bedside table within reach.  at bedside. No further needs expressed at this time.  Angie Thompson

## 2021-05-05 NOTE — PROGRESS NOTES
Data- discharge order received, pt verbalized agreement to discharge, needs for [facility] with [facility] for PT/OT and skilled nursing, MAREN reviewed and signed by MD, to be completed by RN. Action- AVS prepared, discharge instructions prepared and given to patient/, medication information packet given r/t NEW or CHANGED prescriptions, pt verbalized understanding further self-review. D/C instruction summary: Diet- Cardiac, Activity- up as tolerated, follow up with Primary Care Physician Beatriz Gaytan [phone] appointment made with cardiology for Monday 4/10, patient will have labs drawn by  , immunizations reviewed, medications prescriptions filled and given to patient at bedside. Inpatient surgical procedural reviewed: LHC discussed with post-op care. Contact information provided to above agencies used. Response- Case Management/ reported faxing completed MAREN and AVS to needed HHC/DME services stated above. Pt belongings gathered, IV removed, pt dressed. Disposition is home with HHC/DME as stated above, transported with , taken to lobby via w/c with belongings, no complications. D/C instructions given to patient and reviewed with pt at bedside including new medications, side effects and post-op care for [facility]. Discussed Lovenox injection including administration and side effects and both state understanding and are in agreement with discharge plan.  Angie Thompson 4:21 PM

## 2021-05-05 NOTE — DISCHARGE SUMMARY
Patient: Adrián Odonnell     Gender: female  : 1945   Age: 77 y.o.   MRN: [id number]    Admitting Physician: Trish Harrison MD  Discharge Physician: Gerald Jarvis MD     Code Status: Full Code     Admit Date: 4/15/2021   Discharge Date:       Disposition:  Home    Discharge Diagnoses:   Acute on chronic severe systolic heart failure  Bioprosthetic mitral valve replacement  Chronic persistent atrial fibrillation  Previous DVT PE  Acute kidney injury on baseline of chronic kidney disease stage III      Active Hospital Problems    Diagnosis Date Noted    Chronic diastolic heart failure (HCC) [I50.32]     Hypokalemia [E87.6]     Acute on chronic systolic CHF (congestive heart failure) (HCC) [I50.23] 2021    Hypoglycemia [E16.2] 2021    Atrial fibrillation with rapid ventricular response (HCC) [I48.91]     Chest pain [R07.9]     Dyspnea on exertion [R06.00]     Acute kidney injury superimposed on CKD ([address] [N17.9, N18.9]     Stage 3 chronic kidney disease [N18.30]     Coronary artery disease involving native heart without angina pectoris [I25.10]     Persistent atrial fibrillation ([address] [I48.19] 10/30/2019    S/P MVR (mitral valve repair) [Z98.890]     Chronic systolic CHF (congestive heart failure), NYHA class 3 ([address] [I50.22] 01/15/2019    Obesity, Class I, BMI 30-34.9 [E66.9]     S/P CABG x 3 [Z95.1] 2018    PAF (paroxysmal atrial fibrillation) ([address] [I48.0]     Type 2 diabetes mellitus with hyperglycemia, with long-term current use of insulin ([address] [E11.65, Z79.4] 2017       Follow-up appointments:  one week    Outpatient to do list: as per home health     Condition at Discharge:  Jessica Robinson Noe Course:     60-year-old lady admitted to the hospital with dyspnea     Acute on chronic severe systolic heart failure  Last ejection fraction 10 to 15%  During the course of her stay she was diuresed with Lasix drip and started on milrinonedrip  Has been 15 L negative since admission  Her Lasix was changed to oral torsemide 50 bid  Spironolactone 25 mg po, losartan 25 mg po ,metoprolol xl  restarted   Cards    JUDE done showed SERAFIN 1.5 cm no sever AS  AICD to be done as an outpatient per electrophysiology     Chronic atrial fibrillation  Atrial fibrillation is persistent  Has a history of left atrial appendage clipping  Failed cardioversion  Per electrophysiology continue amiodarone  She had repeat cardioversion today on 5/4  Postprocedure she again went back into atrial fibrillation with intermittently back into sinus rhythm at this point electrophysiology increase amiodarone to 200 mg twice daily  Amiodarone levels have been sent which will need to be followed up    Bioprosthetic mitral valve  Patient was off Coumadin while she was getting procedure subsequently she was started on Lovenox twice daily given the rising creatinine her Lovenox dose has been decreased to 1 mg/kg daily she will be continued on the Coumadin and will follow up with her cardiologist  Her goal is 3-3.5 for the INR  I have arranged for home health to get INR draws     CAD s/p CABG  Continue aspirin beta-blocker and statin     Aortic stenosis  JUDE showed moderate aortic stenosis     Hypertension  Blood pressure controlled     Acute kidney injury on chronic kidney disease    Her creatinine initially went up during diuresis however with ongoing diuresis and milrinone her creatinine is stabilized at around 1.5  She has been resumed on oral torsemide losartan and spironolactone with that her creatinine did go up to 2 having discussed with heart failure and nephrology the plan was to leave her on her current regimen I have ordered for BMP to be done as an outpatient and she will be closely followed up with the heart failure         Discharge Medications:   Current Discharge Medication List      START taking these medications    Details   amiodarone (CORDARONE) 200 MG tablet Take 1 tablet by mouth 2 times daily  Qty: 30 tablet, Refills: 1      enoxaparin (LOVENOX) 100 MG/ML injection Inject 0.9 mLs into the skin daily  Qty: 10 Syringe, Refills: 0      losartan (COZAAR) 25 MG tablet Take 1 tablet by mouth daily  Qty: 30 tablet, Refills: 3      metoprolol succinate (TOPROL XL) 50 MG extended release tablet Take 1 tablet by mouth daily  Qty: 30 tablet, Refills: 3           Current Discharge Medication List      CONTINUE these medications which have CHANGED    Details   spironolactone (ALDACTONE) 25 MG tablet Take 1 tablet by mouth daily  Qty: 30 tablet, Refills: 3      torsemide (DEMADEX) 10 MG tablet Take 5 tablets by mouth 2 times daily  Qty: 30 tablet, Refills: 3           Current Discharge Medication List      CONTINUE these medications which have NOT CHANGED    Details   gabapentin (NEURONTIN) 300 MG capsule Take 1 capsule by mouth nightly for 90 days. Intended supply: 30 days  Qty: 90 capsule, Refills: 0    Associated Diagnoses: Cervical radiculopathy; Foraminal stenosis of cervical region; Cervical spondylosis without myelopathy; DDD (degenerative disc disease), cervical      insulin glargine (LANTUS SOLOSTAR) 100 UNIT/ML injection pen INJECT 26 UNITS IN THE MORNING AND 18 UNITS AT BEDTIME  Qty: 90 mL, Refills: 1      exenatide (BYETTA 10 MCG PEN) 10 MCG/0.04ML injection Inject 0.04 mLs into the skin 2 times daily (with meals)  Qty: 3 pen, Refills: 1      OXYCODONE HCL PO Take 10 mg by mouth As of 1/21/2021,  states patient is taking 10mg with edge being taken off her pain after 3 hours.       ACETAMINOPHEN PO Take 500 mg by mouth every 6 hours as needed       albuterol sulfate  (90 Base) MCG/ACT inhaler USE 2 INHALATIONS EVERY 6 HOURS AS NEEDED FOR WHEEZING  Qty: 25.5 g, Refills: 2      albuterol (PROVENTIL) (2.5 MG/3ML) 0.083% nebulizer solution Take 3 mLs by nebulization every 6 hours as needed for Wheezing  Qty: 120 each, Refills: 3      polyethylene glycol (GLYCOLAX) 17 GM/SCOOP powder Take 17 g by mouth every other day       omeprazole (PRILOSEC) 20 MG delayed release capsule Take 20 mg by mouth daily      ondansetron (ZOFRAN) 4 MG tablet Take 1 tablet by mouth 3 times daily as needed for Nausea or Vomiting  Qty: 30 tablet, Refills: 0      aspirin 81 MG chewable tablet Take 1 tablet by mouth daily  Qty: 30 tablet, Refills: 3      vitamin B-12 500 MCG tablet Take 1 tablet by mouth daily  Qty: 30 tablet, Refills: 3      potassium chloride (KLOR-CON M) 10 MEQ extended release tablet TAKE 1 TABLET DAILY  Qty: 90 tablet, Refills: 0      montelukast (SINGULAIR) 10 MG tablet TAKE 1 TABLET NIGHTLY  Qty: 90 tablet, Refills: 0    Comments: YOUR PATIENT HAS REQUESTED A REFILL OF THIS MEDICATION, PREVIOUSLY AUTHORIZED BY ANOTHER PRESCRIBER. warfarin (COUMADIN) 5 MG tablet TAKE 1 TABLET DAILY  Qty: 100 tablet, Refills: 0    Comments: YOUR PATIENT HAS REQUESTED A REFILL OF THIS MEDICATION, PREVIOUSLY AUTHORIZED BY ANOTHER PRESCRIBER.       blood glucose test strips (FREESTYLE LITE) strip USE TO TEST FOUR TIMES A DAY  Qty: 200 strip, Refills: 4    Associated Diagnoses: Diabetes mellitus type 2 in obese (HCC)      FreeStyle Lancets MISC 1 each by Does not apply route 4 times daily  Qty: 200 each, Refills: 5    Comments: Patient to check blood sugar 4 times a day and PRN, she is treated with multiple daily injections of insulin that require correction dosing ;A1C 8.1 ; ICD code-E11.65 ,Z79.4  Associated Diagnoses: Diabetes mellitus type 2 in obese (HCC)      Blood Glucose Monitoring Suppl (EMBRACE PRO GLUCOSE METER) GAETANO 1 Device by Does not apply route 4 times daily  Qty: 1 Device, Refills: 0    Associated Diagnoses: Diabetes mellitus type 2 in obese (HCC)      HUMALOG KWIKPEN 100 UNIT/ML SOPN TAKE AS INSTRUCTED BY YOUR PRESCRIBER  Qty: 15 mL, Refills: 8      nystatin (MYCOSTATIN) 912308 UNIT/ML suspension TAKE ONE TEASPOONFUL BY MOUTH FOUR TIMES A DAY FOR 5 DAYS  Qty: 1 Bottle, Refills: 2      BD PEN NEEDLE JANNET U/F 32G X 4 MM MISC USE 1 PEN NEEDLE FOUR TIMES A DAY  Qty: 360 each, Refills: 3    Associated Diagnoses: Diabetes mellitus type 2 in obese [facility])           Current Discharge Medication List      STOP taking these medications       amoxicillin-clavulanate (AUGMENTIN) 875-125 MG per tablet Comments:   Reason for Stopping:         fluconazole (DIFLUCAN) 100 MG tablet Comments:   Reason for Stopping:         carvedilol (COREG) 6.25 MG tablet Comments:   Reason for Stopping:               Discharge ROS:  A complete review of systems was asked and negative    Discharge Exam:    /72   Pulse 108   Temp 97.5 °F (36.4 °C) (Temporal)   Resp 16   Ht 5' 8\" (1.727 m)   Wt 199 lb 4 oz (90.4 kg)   SpO2 95%   BMI 30.30 kg/m²   General appearance:  NAD  HEENT:   Normal cephalic, atraumatic, moist mucous membranes, no oropharyngeal erythema or exudate  Heart[de-identified] Normal s1/s2, RRR, no murmurs, gallops, or rubs. no leg edema  Lungs:  Normal respiratory effort. Clear to auscultation, bilaterally without Rales/Wheezes/Rhonchi. Abdomen: Soft, non-tender, non-distended, bowel sounds present, no masses  Musculoskeletal:  No clubbing, no cyanosis, *  Neurologic:  Neurovascularly intact without any focal sensory/motor deficits. Cranial nerves: II-XII intact, grossly non-focal.    Labs:  For convenience and continuity at follow-up the following most recent labs are provided:    Lab Results   Component Value Date    WBC 6.7 05/05/2021    HGB 10.2 05/05/2021    HCT 31.2 05/05/2021    MCV 84.5 05/05/2021     05/05/2021     05/05/2021    K 3.7 05/05/2021    K 3.5 04/21/2021    CL 92 05/05/2021    CO2 33 05/05/2021    BUN 32 05/05/2021    CREATININE 2.0 05/05/2021    CALCIUM 8.7 05/05/2021    PHOS 4.0 05/05/2021     09/02/2020    ALKPHOS 215 04/15/2021    ALT 10 04/15/2021    AST 15 04/15/2021    BILITOT 0.6 04/15/2021    BILIDIR <0.2 04/15/2021    LABALBU 3.3 05/05/2021    LDLCALC 97 12/23/2020    TRIG 94 03/03/2021 Lab Results   Component Value Date    INR 1.11 05/05/2021    INR 1.14 05/04/2021    INR 1.27 (H) 05/03/2021           The patient was seen and examined on day of discharge and this discharge summary is in conjunction with any daily progress note from day of discharge. Time Spent on discharge is 45 minutes  in the examination, evaluation, counseling and review of medications and discharge plan. Note that greater  than 30 minutes was spent in preparing discharge papers, discussing discharge with patient, medication review, etc.       Signed:    Milton Mei MD   5/5/2021      Thank you Penelope Courtney MD for the opportunity to be involved in this patient's care.  If you have any questions or concerns please feel free to contact me

## 2021-05-05 NOTE — PROGRESS NOTES
Shift assessment completed. Pt is a/o X 4. VSS. Medications administered, see MAR. POC discussed and all questions answered. Pt provided with fresh ice water. Pt has belongings and call light in reach. Bed is locked and in lowest position, bed alarm is refused by pt and family and pt is a medium fall risk and has a steady gait. Pt denies further needs, will continue to monitor.

## 2021-05-05 NOTE — PROGRESS NOTES
Pharmacy to Dose Warfarin    Pharmacy consulted to dose warfarin for Afib. INR Goal: 2-3    INR today: 1.11    Assessment/Plan:  - INR subtherapeutic today after holding warfarin for procedures  - Will continue warfarin 5 mg tonight and continue with lovenox bridge  - Given CrCl 29 mL/min, can continue with lovenox 1 mg/kg daily instead of BID at discharge if leaving today  - Daily INR ordered    Pharmacy will continue to follow.     Sergio Montanez, PharmD, [facility]  Clinical Pharmacist  [id number]

## 2021-05-05 NOTE — PROGRESS NOTES
[address]   Daily Progress Note      Admit Date:  4/15/2021    HPI:    Ms. Lamont Simpson 77 y.o. female with history of CAD/CABG in 2018, PAF with WAYNE clip 8/18 and maze 2018, ablation of AF, s/p ILR, HTN, HLD, DVT/PE on coumadin, 2 CVs unsuccessful, sHF    She is admitted with palpitations and tachycardia, found to be in AF with RVR and started diltiazem gtt. probnp 8766->15K (best is around 5K)  Unsuccessful CV x2  Echo with LVEF 10-15%    Subjective:  Patient is being seen for chronic sHF. There were no acute overnight cardiac events. Weight 203->198 (best weight)  She had CV done yesterday and was in sinus but this morning back in AFlutter in the 110s creat 2.0 probnp down to 5182 albumin 3.3   Milrinone stopped yesterday. She is sitting up in the chair disappointed about her rhythm. probnp [id number]->[id number]    Objective:   BP 98/61   Pulse 114   Temp 97.4 °F (36.3 °C) (Temporal)   Resp 16   Ht 5' 8\" (1.727 m)   Wt 198 lb 3.2 oz (89.9 kg)   SpO2 94%   BMI 30.14 kg/m²       Intake/Output Summary (Last 24 hours) at 5/5/2021 6803  Last data filed at 5/5/2021 0016  Gross per 24 hour   Intake 720 ml   Output --   Net 720 ml          Physical Exam:  General:  Awake, alert, oriented in NAD  Skin:  Warm and dry. No unusual bruising or rash  Neck:  Supple. no JVD or carotid bruit appreciated  Chest:  Normal effort.   Clear to auscultation, no wheezes/rhonchi/rales  Cardiovascular:  reg, S1/S2, no murmur/gallop/rub  Abdomen:  Soft, nontender, +bowel sounds  Extremities:  Bilateral ankle to lower calf area  Neurological: No focal deficits  Psychological: Normal mood and affect      Medications:    furosemide  40 mg Intravenous Once    [START ON 5/6/2021] spironolactone  12.5 mg Oral Daily    metoprolol succinate  50 mg Oral Daily    torsemide  50 mg Oral BID    enoxaparin  1 mg/kg Subcutaneous BID    warfarin  5 mg Oral Daily    losartan  25 mg Oral Daily    insulin glargine  10 Units Subcutaneous Nightly    amiodarone  200 mg Oral Daily    naphazoline-pheniramine  1 drop Both Eyes BID    polyethylene glycol  17 g Oral BID    aspirin  81 mg Oral Daily    gabapentin  300 mg Oral Nightly    montelukast  10 mg Oral Nightly    pantoprazole  40 mg Oral QAM AC    sodium chloride flush  5-40 mL Intravenous 2 times per day    insulin lispro  0-12 Units Subcutaneous TID WC    insulin lispro  0-6 Units Subcutaneous Nightly      sodium chloride 25 mL (04/30/21 1015)    dextrose         Lab Data:  CBC:   Recent Labs     05/05/21  0600   WBC 6.7   HGB 10.2*        BMP:    Recent Labs     05/03/21  0515 05/04/21  0500 05/05/21  0600    139 133*   K 3.7 4.0 3.7   CO2 34* 33* 33*   BUN 35* 29* 32*   CREATININE 1.6* 1.6* 2.0*     INR:    Recent Labs     05/03/21  0515 05/04/21  0500 05/05/21  0600   INR 1.27* 1.14 1.11     BNP:    Recent Labs     05/05/21  0600   PROBNP 5,182*         Diagnostics:  JUDE Preliminary 5/4/2021 Dr Hali Kumar  AV - area ~ 1.5, opens adequately, not severe aortic stenosis    [facility] 5/3/2021 Dr Hali Kumar  Artery Findings/Result   LM Normal   LAD 50% mid, 70% mid, competitive flow distal from LIMA   Cx OM1 20% ostial to prox, superior branch mid OM1 50% ostial   RI N/A   S-D-RPL patent   L-LAD patent   RCA 50-60% distal, very heavily calcified   LVEDP 15   AV Peak to peak gradient 36mmHg, valve crossed easily with pigtail. LVG NA      Intervention:         None     Post Cath Dx:       Patent grafts    Echo 4/20/21  Summary   Overall left ventricular systolic function is severely depressed . Ejection fraction is visually estimated to be 10-15 %. E/e'= 23.2 GLS=-3.4   Moderately dilated left ventricle. There is severe diffuse hypokinesis. Indeterminate diastolic function. A bioprosthetic mitral valve is well seated with peak velocity of 1.59m/s   and a mean gradient of 3mmHg. The left atrium is moderately dilated.    Mild aortic stenosis with a peak velocity of 2.5m/s and a mean pressure   gradient of 14mmHg. The aortic valve is thickened/calcified with decreased leaflet mobility   consistent with aortic stenosis. Mild to moderate tricuspid regurgitation. Estimated pulmonary artery systolic pressure is mildly to moderately   elevated at 46 mmHg assuming a right atrial pressure of 8 mmHg    11/30/2020 Dobutamine Echo   Dobutamine echocardiogram for aortic stenosis.   Very technically limited exam due to atrial fibrillation.   Baseline echocardiogram shows severe left ventricular dysfunction with   anterior, lateral and apical hypokinesis with an ejection fraction of 20-25%.   There is no improvement in wall motion with low or high dose dobutamine   suggestive of nonviable myocardium.      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 After dobutamine stress the mean AV   gradient rises to 20mmHg with 20mcg of dobutamine consistent with mild to   moderate aortic stenosis.  Prosthetic mitral valve with a mean gradient of 7mmHg        JUDE 10/21/2020  Mildly dilated left ventricular size and normal wall thickness. Global left ventricular function is severely decreased with ejection fraction estimated at 25%. Patient is in atrial fibrillation during procedure.      The bioprosthetic mitral valve is well seated with a mean gradient of 5mmHg and maximum pressure gradient of 15 mmHg with heart rate of 89 bpm. Pressure half time of 82 ms. There is trivial mitral regurgitation.      Left atrial enlargement. Spontaneous echo contrast seen in the left atrium. A large stump of the left atrial appendage with no thrombus noted. Patient has history of surgical ligation of the left atrial appendage. There are spontaneous echo contrast in the atrial appendage.      The aortic valve is thickened/calcified with decreased leaflet mobility consistent with aortic stenosis.  There is trivial aortic insufficiency.     Rola Masterson is moderate tricuspid regurgitation.     ECHO 9/15/20  Left ventricular cavity size is dilated. There is normal wall thickness with a moderately severe reduction in   systolic function.   LV ejection fraction is visually estimated to be 25-30%.   Indeterminate diastolic function.   The right ventricle is not well visualized but appears to be normal in size with moderately reduced function.   The left atrium is moderately dilated.   A bioprosthetic mitral valve with a mean gradient of 7 mmHg. This may be a normal gradient for this valve but could suggest mild stenosis.   Trivial mitral regurgitation.   The aortic valve is thickened/calcified with a mean gradient of 16mmHg consistent with at least mild aortic stenosis. This is likely underestimated due to low cardiac output secondary to LV dysfunction.   No significant aortic valve regurgitation.   Moderate tricuspid regurgitation with RVSP of 48 mmHg. Assessment:    1. Atrial fibrillation with RVR, on coumadin  2. Chest pain  3. Dyspnea on exertion  4. Chronic systolic heart failure, on bb, arb and aldosterone antagonist  5. TORIBIO on CKD    Plan:    1. Nephrology following. Continue torsemide 50 mg bid and aldactone at 25 mg once a day  2. Use metolazone 5 mg prn at home if weight >3 pounds in a day  3. Continue metoprolol 50 mg daily  4. CHF nurse following for diet education, fluid restriction and daily weights  5. Continue low sodium diet and she will try and cut back more on her fluids  6. Continue losartan 25 mg once a day  7. EP following    Discussed with Dr Severiano Lie and Dr Tomy Parekh. [id number] [address] for discharge. She will get labs done on Friday and has an appt on Monday for follow up    She has meds to bed. Discussed with bedside nurse    Discussed with patient and  who are agreeable with plan of care. Thank you for allowing me to participate in the care of your patient.     Nazanin Etienne, CNS

## 2021-05-06 ENCOUNTER — TELEPHONE (OUTPATIENT)
Dept: FAMILY MEDICINE CLINIC | Age: 76
End: 2021-05-06

## 2021-05-06 ENCOUNTER — TELEPHONE (OUTPATIENT)
Dept: CARDIOLOGY CLINIC | Age: 76
End: 2021-05-06

## 2021-05-06 ENCOUNTER — CARE COORDINATION (OUTPATIENT)
Dept: CASE MANAGEMENT | Age: 76
End: 2021-05-06

## 2021-05-06 DIAGNOSIS — I50.32 CHRONIC DIASTOLIC HEART FAILURE (HCC): Primary | ICD-10-CM

## 2021-05-06 PROCEDURE — 1111F DSCHRG MED/CURRENT MED MERGE: CPT | Performed by: FAMILY MEDICINE

## 2021-05-06 RX ORDER — AMIODARONE HYDROCHLORIDE 200 MG/1
200 TABLET ORAL 2 TIMES DAILY
Qty: 30 TABLET | Refills: 1 | Status: CANCELLED | OUTPATIENT
Start: 2021-05-06

## 2021-05-06 NOTE — TELEPHONE ENCOUNTER
Medication:   Pending Prescriptions Disp Refills    amiodarone (CORDARONE) 200 MG tablet 30 tablet 1     Sig: Take 1 tablet by mouth 2 times daily    enoxaparin (LOVENOX) 100 MG/ML injection 10 Syringe 0     Sig: Inject 0.9 mLs into the skin daily        Patient Phone Number: [phone] (home)     Last appt: 3/23/2021   Next appt: Visit date not found    Last OARRS:   RX Monitoring 3/1/2019   Attestation The Prescription Monitoring Report for this patient was reviewed today.    Periodic Controlled Substance Monitoring -     Last PDMP Zuleika Beverly as Reviewed [facility]):  Review User Review Instant Review Result   Carlos Nava 12/28/2020 11:26 AM Reviewed PDMP [1]     Preferred Pharmacy:   [address], [address] [phone] - F [phone]  [place] [address]  Phone: [phone] Fax: [phone]    [facility] [address], [address] [facility] [phone] [facility] [phone] [address] [facility] [facility] [address]  [address] [place] [address]  Phone: [phone] Fax: [phone]

## 2021-05-06 NOTE — CARE COORDINATION
regarding infection prevention, and signs and symptoms of COVID-19 and when to seek medical attention with patient who verbalized understanding. Discussed exposure protocols and quarantine From CDC: Are you at higher risk for severe illness?   and given an opportunity for questions and concerns. The patient agrees to contact the COVID-19 hotline 134-093-7080 or PCP office for questions related to COVID-19. For more information on steps you can take to protect yourself, see CDC's How to Protect Yourself     Was patient discharged with a pulse oximeter? No     Discussed follow-up appointments. If no appointment was previously scheduled, appointment scheduling offered: Yes. Is follow up appointment scheduled within 7 days of discharge? Yes  Non-[facility] follow up appointment(s):     Plan for follow-up call in 3-5 days based on severity of symptoms and risk factors. Plan for next call: symptom management-., self management-. and follow up appointment-. CTN provided contact information for future needs.           Non-face-to-face services provided:  Obtained and reviewed discharge summary and/or continuity of care documents  Communication with home health agencies or other community services the patient is currently using-[facility]    Care Transitions 24 Hour Call    Do you have any ongoing symptoms?: No  Do you have a copy of your discharge instructions?: Yes  Do you have all of your prescriptions and are they filled?: Yes  Have you been contacted by a [address]?: No  Have you scheduled your follow up appointment?: Yes  How are you going to get to your appointment?: Car - family or friend to transport  Were you discharged with any Home Care or Post Acute Services: Yes  Post Acute Services: Home Health  Patient DME: Shower chair, Straight cane, Walker  Do you have support at home?: Partner/Spouse/SO  Do you feel like you have everything you need to keep you well at home?: Yes  Are you an active caregiver in

## 2021-05-06 NOTE — TELEPHONE ENCOUNTER
Carroll Soto from [facility] called to report patients INR 1.1 5/5/21        enoxaparin (LOVENOX) 100 MG/ML injection [[id number]      Should patient be taking this twice a day?        amiodarone (CORDARONE) 200 MG tablet [[id number]      Need dosing clarification. Pharmacy filled differently than written order        Does patient need to to start doing BMP blood  work twice a week next week because she is doing blood work for her Cardiologist tomorrow    How long will she need her INR checked 3 times a week?

## 2021-05-07 ENCOUNTER — TELEPHONE (OUTPATIENT)
Dept: OTHER | Age: 76
End: 2021-05-07

## 2021-05-07 ENCOUNTER — HOSPITAL ENCOUNTER (OUTPATIENT)
Age: 76
Discharge: HOME | End: 2021-05-07
Payer: MEDICARE

## 2021-05-07 DIAGNOSIS — I50.22 SYSTOLIC CHF, CHRONIC (HCC): ICD-10-CM

## 2021-05-07 LAB
A/G RATIO: 1 (ref 1.1–2.2)
ALBUMIN SERPL-MCNC: 3.4 G/DL (ref 3.4–5)
ALP BLD-CCNC: 161 U/L (ref 40–129)
ALT SERPL-CCNC: 11 U/L (ref 10–40)
ANION GAP SERPL CALCULATED.3IONS-SCNC: 8 MMOL/L (ref 3–16)
AST SERPL-CCNC: 17 U/L (ref 15–37)
BILIRUB SERPL-MCNC: 0.4 MG/DL (ref 0–1)
BUN BLDV-MCNC: 25 MG/DL (ref 7–20)
CALCIUM SERPL-MCNC: 8.2 MG/DL (ref 8.3–10.6)
CHLORIDE BLD-SCNC: 97 MMOL/L (ref 99–110)
CO2: 33 MMOL/L (ref 21–32)
CREAT SERPL-MCNC: 1.4 MG/DL (ref 0.6–1.2)
GFR AFRICAN AMERICAN: 44
GFR NON-AFRICAN AMERICAN: 37
GLOBULIN: 3.4 G/DL
GLUCOSE BLD-MCNC: 167 MG/DL (ref 70–99)
HCT VFR BLD CALC: 31.1 % (ref 36–48)
HEMOGLOBIN: 10.1 G/DL (ref 12–16)
MCH RBC QN AUTO: 27.4 PG (ref 26–34)
MCHC RBC AUTO-ENTMCNC: 32.5 G/DL (ref 31–36)
MCV RBC AUTO: 84.3 FL (ref 80–100)
PDW BLD-RTO: 17.5 % (ref 12.4–15.4)
PLATELET # BLD: 274 K/UL (ref 135–450)
PMV BLD AUTO: 9.1 FL (ref 5–10.5)
POTASSIUM SERPL-SCNC: 3.4 MMOL/L (ref 3.5–5.1)
PRO-BNP: 4336 PG/ML (ref 0–449)
RBC # BLD: 3.69 M/UL (ref 4–5.2)
SODIUM BLD-SCNC: 138 MMOL/L (ref 136–145)
TOTAL PROTEIN: 6.8 G/DL (ref 6.4–8.2)
WBC # BLD: 4.7 K/UL (ref 4–11)

## 2021-05-07 PROCEDURE — 80053 COMPREHEN METABOLIC PANEL: CPT

## 2021-05-07 PROCEDURE — 83880 ASSAY OF NATRIURETIC PEPTIDE: CPT

## 2021-05-07 PROCEDURE — 36415 COLL VENOUS BLD VENIPUNCTURE: CPT

## 2021-05-07 PROCEDURE — 85027 COMPLETE CBC AUTOMATED: CPT

## 2021-05-07 NOTE — TELEPHONE ENCOUNTER
[facility] FAILURE PROGRAM  TELEPHONE ENCOUNTER FORM    Rainer Delgado 1945    ASSESSMENT:   1. Baseline weight: 194 lbs  2. Current weight: 194 lbs  3. Patient weighing daily: Yes  4. What are your symptoms of heart failure: dyspnea, edema  5. Are you having any symptoms:  Yes  6. Patient knows who to call with symptoms: Yes  7. Diet history:      Patient states sodium limitation is : 3000 mg       Patient states fluid limitation is 64 oz  Patient following diet as instructed: Yes  8. Medication history: taking medications as instructed Yes; medication side effects noted No  9. Patient is being active at home: Yes  10. Patient knows date and time of follow up appointment: Yes  11. Patient has transportation to appointments: Yes    RECOMMENDATIONS:   1. Medication: taking as prescribed  2. Diet: no added salt diet  3. MD/ Clinic appointment: 5/10 with Dr. Rahul Knapp  4. Other:  Spoke with spouse. Patient doing well. Some dyspnea with activity. Patient had labs drawn today for appointment on Monday. Encouraged patient to call with any questions or concerns.

## 2021-05-07 NOTE — PROGRESS NOTES
[facility]  Advanced CHF/Pulmonary Hypertension   Cardiac Evaluation      Ezequiel eLahy  YOB: 1945    Date of Visit:  5/10/21    Chief Complaint   Patient presents with    Congestive Heart Failure    Shortness of Breath      History of Present Illness:  Ezequiel Leahy is a 77 y.o. female who presents from referral from Dr. Kimberly Clark for consultation and management of worsening heart failure; JUDE on 10/21/20 showed EF 25%. ECHO 9/15/2020 with EF 25-30%. She saw Dr. Kimberly Clark 11/12/2020 c/o LEVIN; she sees him for AS. 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 She's had two DCCV's, ultimately unsuccessful. Patient did not tolerate metolazone due to hypotension in the past. She was hospitalized March 2021 for small bowel pneumotosis with loculated pneumoperitoneum and was on TPN during that time. Her dry weight is 199 lbs. At home her weight has been around 203 lbs. She has had a hospital admission 4/15 to 5/5/21 for acute severe systolic heart failure EF 10% with kidney injury. She was first admitted with Atrial fib with RVR with 's, chest pain 7/10 pressure like associated with palpitations. She was started on Dilt drip and Lasix 20mg IVP in ED. She was started and continues on Amiodarone. Cardizem drip DC'd due to negative inotropic effect. She was then started on Milrinone drip 0.1mcg/kg/min and a Lasix drip at 5mg/hr. She had a LHC on 5/3/21, no intervention and a successful cardioversion on 5/4/21 per Dr. Brice Fabian. She is following with Dr. Román Mahajan for a ICD in the future. Her JUDE, on 5/4, showed AV area 1.5 opens adequately; not severe AS per Dr. Kimberly Clark. Today she is here with her  in a wheelchair. She states she has muscle jerking at rest in her hands and legs. Labs reviewed with pt. Today she is in normal rhythm. Discussed with patient. The jerking movement started this last week. Torsemide is 50mg BID. She is not taking magnesium. She has no energy. She has her usual SOB. States her hands have numbness at times. EKG today shows: NSR with first degree block    NYHA Class 3-4    Allergies   Allergen Reactions    Otxxyyrn-Rzsuinx-Kmnuib [Fluocinolone] Shortness Of Breath    Ciprofloxacin Shortness Of Breath    Diovan [Valsartan] Shortness Of Breath    Flagyl [Metronidazole] Shortness Of Breath     Has taken diflucan at home 12/7/15    Metformin And Related [Metformin And Related] Shortness Of Breath    Benazepril      Other reaction(s): Not Recorded    Morphine      Bad reaction. \"makes her feel horrible\".      Saxagliptin      Other reaction(s): Not Recorded    Levaquin [Levofloxacin] Rash     Current Outpatient Medications   Medication Sig Dispense Refill    amiodarone (CORDARONE) 200 MG tablet Take 1 tablet by mouth 2 times daily 30 tablet 1    enoxaparin (LOVENOX) 100 MG/ML injection Inject 0.9 mLs into the skin daily 10 Syringe 0    losartan (COZAAR) 25 MG tablet Take 1 tablet by mouth daily 30 tablet 3    metoprolol succinate (TOPROL XL) 50 MG extended release tablet Take 1 tablet by mouth daily 30 tablet 3    spironolactone (ALDACTONE) 25 MG tablet Take 1 tablet by mouth daily 30 tablet 3    torsemide (DEMADEX) 10 MG tablet Take 5 tablets by mouth 2 times daily 30 tablet 3    potassium chloride (KLOR-CON M) 10 MEQ extended release tablet TAKE 1 TABLET DAILY 90 tablet 0    montelukast (SINGULAIR) 10 MG tablet TAKE 1 TABLET NIGHTLY 90 tablet 0    warfarin (COUMADIN) 5 MG tablet TAKE 1 TABLET DAILY (Patient taking differently: Managed by PCP) 100 tablet 0    insulin glargine (LANTUS SOLOSTAR) 100 UNIT/ML injection pen INJECT 26 UNITS IN THE MORNING AND 18 UNITS AT BEDTIME 90 mL 1    exenatide (BYETTA 10 MCG PEN) 10 MCG/0.04ML injection Inject 0.04 mLs into the skin 2 times daily (with meals) 3 pen 1    OXYCODONE HCL PO Take 10 mg by mouth As of 1/21/2021,  states patient is taking 10mg with edge being taken off her pain after 3 hours.  ACETAMINOPHEN PO Take 500 mg by mouth every 6 hours as needed       albuterol sulfate  (90 Base) MCG/ACT inhaler USE 2 INHALATIONS EVERY 6 HOURS AS NEEDED FOR WHEEZING 25.5 g 2    albuterol (PROVENTIL) (2.5 MG/3ML) 0.083% nebulizer solution Take 3 mLs by nebulization every 6 hours as needed for Wheezing 120 each 3    polyethylene glycol (GLYCOLAX) 17 GM/SCOOP powder Take 17 g by mouth every other day       omeprazole (PRILOSEC) 20 MG delayed release capsule Take 20 mg by mouth daily      ondansetron (ZOFRAN) 4 MG tablet Take 1 tablet by mouth 3 times daily as needed for Nausea or Vomiting 30 tablet 0    HUMALOG KWIKPEN 100 UNIT/ML SOPN TAKE AS INSTRUCTED BY YOUR PRESCRIBER (Patient taking differently: Only if high blood sugar-has not been using) 15 mL 8    nystatin (MYCOSTATIN) 777568 UNIT/ML suspension TAKE ONE TEASPOONFUL BY MOUTH FOUR TIMES A DAY FOR 5 DAYS 1 Bottle 2    aspirin 81 MG chewable tablet Take 1 tablet by mouth daily 30 tablet 3    vitamin B-12 500 MCG tablet Take 1 tablet by mouth daily 30 tablet 3    gabapentin (NEURONTIN) 300 MG capsule Take 1 capsule by mouth nightly for 90 days. Intended supply: 30 days (Patient not taking: Reported on 5/10/2021) 90 capsule 0    blood glucose test strips (FREESTYLE LITE) strip USE TO TEST FOUR TIMES A  strip 4    FreeStyle Lancets MISC 1 each by Does not apply route 4 times daily 200 each 5    Blood Glucose Monitoring Suppl (EMBRACE PRO GLUCOSE METER) GAETANO 1 Device by Does not apply route 4 times daily 1 Device 0    BD PEN NEEDLE JANNET U/F 32G X 4 MM MISC USE 1 PEN NEEDLE FOUR TIMES A  each 3     No current facility-administered medications for this visit.         Past Medical History:   Diagnosis Date    Asthma     Atrial fibrillation (HCC)     Eosinophilia     Hemoptysis     HIGH CHOLESTEROL     Hx of blood clots     Hypertension     Irregular heart beat  Other specified gastritis without mention of hemorrhage     Palpitations     Skin cancer     basal and squamous    Type II or unspecified type diabetes mellitus without mention of complication, not stated as uncontrolled      Past Surgical History:   Procedure Laterality Date    BRONCHOSCOPY  2016    Dr. Ahsan Miller - brushings from [address] [place] [address],[facility]  2018    Dr. Davin Howell and 5/3 2021    Cardiac cath, cardioversion and rafat     SECTION      CHOLECYSTECTOMY      COLONOSCOPY  2017    Dr. Manju Conroy - sigmoid diverticulosis, polypectomies x3    COLONOSCOPY  2014    Dr. Manju Conroy - sigmoid diverticulosis, polypectomies x3, internal hemorrhoids    COLONOSCOPY  10/10/2018    w/biopsy performed by Opal Dawson MD at [place] [address] N/A 2020    COLONOSCOPY DIAGNOSTIC performed by Ahsan Miller MD at [address]  2018    Dr. Teofilo Graves - x3 (LIMA-LAD, L SV-D1-PLV) modified BL MAZE procedure w/obliteration of WAYNE using 45mm AtriClip    HYSTERECTOMY      INSERTABLE CARDIAC MONITOR Left 2018    Dr. Shima Looney - Keon Ribeiro SN# [id number] Medtronic    MITRAL VALVE REPLACEMENT  2018    Dr. Teofilo Graves - 27mm Medtronic Cinch tissue valve    PAIN MANAGEMENT PROCEDURE N/A 2020    C6-C7 MIDLINE  EPIDURAL STEROID INJECTION WITH [facility] [address] [facility]    BILATERAL T11 TRANSFORAMINAL EPIDURAL STEROID INJECTION WITH FLUOROSCOPY performed by Jean Martin MD at [address] TRANSESOPHAGEAL ECHOCARDIOGRAM  2018    during CABG/MVR    TUNNELED VENOUS CATHETER PLACEMENT Left 2018    Dr. Rachel Rosenberg - IJ for HD---since removed    UPPER GASTROINTESTINAL ENDOSCOPY N/A 10/10/2018    w/biopsy performed by Opal Dawson MD at [facility] History   Problem Relation Age of Onset    Cancer Father     Asthma Mother     Hypertension Mother     Heart Disease Mother     High Blood Pressure Mother      Social History     Socioeconomic History    Marital status:      Spouse name: Not on file    Number of children: Not on file    Years of education: Not on file    Highest education level: Not on file   Occupational History    Not on file   Social Needs    Financial resource strain: Not on file    Food insecurity     Worry: Not on file     Inability: Not on file    Transportation needs     Medical: Not on file     Non-medical: Not on file   Tobacco Use    Smoking status: Never Smoker    Smokeless tobacco: Never Used   Substance and Sexual Activity    Alcohol use: No    Drug use: No    Sexual activity: Not on file   Lifestyle    Physical activity     Days per week: Not on file     Minutes per session: Not on file    Stress: Not on file   Relationships    Social connections     Talks on phone: Not on file     Gets together: Not on file     Attends Quaker service: Not on file     Active member of club or organization: Not on file     Attends meetings of clubs or organizations: Not on file     Relationship status: Not on file    Intimate partner violence     Fear of current or ex partner: Not on file     Emotionally abused: Not on file     Physically abused: Not on file     Forced sexual activity: Not on file   Other Topics Concern    Not on file   Social History Narrative    Not on file       Review of Systems:   · Constitutional: there has been no unanticipated weight loss. There's been no change in energy level, sleep pattern, or activity level. · Eyes: No visual changes or diplopia. No scleral icterus. · ENT: No Headaches, hearing loss or vertigo. No mouth sores or sore throat. · Cardiovascular: Reviewed in HPI  · Respiratory: No cough or wheezing, no sputum production. No hematemesis.     · Gastrointestinal: No abdominal pain, appetite loss, blood in stools. No change in bowel or bladder habits. · Genitourinary: No dysuria, trouble voiding, or hematuria. · Musculoskeletal:  No gait disturbance, weakness or joint complaints. · Integumentary: No rash or pruritis. · Neurological: No headache, diplopia, change in muscle strength, numbness or tingling. No change in gait, balance, coordination, mood, affect, memory, mentation, behavior. · Psychiatric: No anxiety, no depression. · Endocrine: No malaise, fatigue or temperature intolerance. No excessive thirst, fluid intake, or urination. No tremor. · Hematologic/Lymphatic: No abnormal bruising or bleeding, blood clots or swollen lymph nodes. · Allergic/Immunologic: No nasal congestion or hives. Physical Examination:    Vitals:    05/10/21 0744   BP: 106/76   Pulse: 72   SpO2: 92%   Weight: 197 lb (89.4 kg)   Height: 5' 8\" (1.727 m)     Body mass index is 29.95 kg/m². Wt Readings from Last 3 Encounters:   05/10/21 197 lb (89.4 kg)   05/05/21 199 lb 4 oz (90.4 kg)   04/13/21 205 lb (93 kg)     BP Readings from Last 3 Encounters:   05/10/21 106/76   05/05/21 106/72   04/13/21 98/60     Constitutional and General Appearance:  Chronically ill appearing  HEENT:  NC/AT  MARIBEL  No problems with hearing  Skin:  Warm, dry  Respiratory:  · Normal excursion and expansion without use of accessory muscles  · Resp Auscultation: Normal breath sounds without dullness  Cardiovascular:  · The apical impulses not displaced  · Heart tones are crisp and normal  · Cervical veins are not engorged  · The carotid upstroke is normal in amplitude and contour without delay or bruit  · JVP less than 8 cm H2O  RRR with nl S1 and S2 without m,r,g  · Peripheral pulses are symmetrical and full  · There is no clubbing, cyanosis of the extremities.   · No edema  · Femoral Arteries: 2+ and equal  · Pedal Pulses: 2+ and equal   Neck:  · No thyromegaly  Abdomen:  · No masses or tenderness  · Liver/Spleen: No Abnormalities Noted  Neurological/Psychiatric:  · Alert and oriented in all spheres  · Moves all extremities well  · Exhibits normal gait balance and coordination  · No abnormalities of mood, affect, memory, mentation, or behavior are noted    5/4/21  Conclusion:   Successful external DC cardioversion of atrial fibrillation/ flutter      5/4/21  Dr. Son Knight - area ~ 1.5, opens adequately, not severe aortic stenosis      [facility] 5/3/21  Findings:  Artery Findings/Result   LM Normal   LAD 50% mid, 70% mid, competitive flow distal from LIMA   Cx OM1 20% ostial to prox, superior branch mid OM1 50% ostial   RI N/A   S-D-RPL patent   L-LAD patent   RCA 50-60% distal, very heavily calcified   LVEDP 15   AV Peak to peak gradient 36mmHg, valve crossed easily with pigtail. LVG NA      Intervention:         None    11/30/2020 Dobutamine Echo   Dobutamine echocardiogram for aortic stenosis. Very technically limited exam due to atrial fibrillation. Baseline echocardiogram shows severe left ventricular dysfunction with   anterior, lateral and apical hypokinesis with an ejection fraction of 20-25%. There is no improvement in wall motion with low or high dose dobutamine   suggestive of nonviable myocardium. Mild prosthetic aortic valve stenosis with a mean gradient of 16mmHg and   peak velocity of 2.47m/s at rest. After dobutamine stress the mean AV   gradient rises to 20mmHg with 20mcg of dobutamine consistent with mild to   moderate aortic stenosis. Prosthetic mitral valve with a mean gradient of 7mmHg       JUDE 10/21/2020  Mildly dilated left ventricular size and normal wall thickness. Global left ventricular function is severely decreased with ejection fraction estimated at 25%. Patient is in atrial fibrillation during procedure. The bioprosthetic mitral valve is well seated with a mean gradient of 5mmHg and maximum pressure gradient of 15 mmHg with heart rate of 89 bpm. Pressure half time of 82 ms.  There is trivial mitral regurgitation. Left atrial enlargement. Spontaneous echo contrast seen in the left atrium. A large stump of the left atrial appendage with no thrombus noted. Patient has history of surgical ligation of the left atrial appendage. There are spontaneous echo contrast in the atrial appendage. The aortic valve is thickened/calcified with decreased leaflet mobility consistent with aortic stenosis. There is trivial aortic insufficiency. There is moderate tricuspid regurgitation. ECHO 9/15/20  Left ventricular cavity size is dilated. There is normal wall thickness with a moderately severe reduction in   systolic function. LV ejection fraction is visually estimated to be 25-30%. Indeterminate diastolic function. The right ventricle is not well visualized but appears to be normal in size with moderately reduced function. The left atrium is moderately dilated. A bioprosthetic mitral valve with a mean gradient of 7 mmHg. This may be a normal gradient for this valve but could suggest mild stenosis. Trivial mitral regurgitation. The aortic valve is thickened/calcified with a mean gradient of 16mmHg consistent with at least mild aortic stenosis. This is likely underestimated due to low cardiac output secondary to LV dysfunction. No significant aortic valve regurgitation. Moderate tricuspid regurgitation with RVSP of 48 mmHg. JUDE 11/1/2019  Normal left ventricular cavity size and wall thickness. Global left ventricular function is moderate-to-severely decreased with ejection fraction estimated from 35% to 40%. Severe apical akinesis noted. The bioprosthetic mitral valve is well seated with a mean gradient of 2mmHg and maximum pressure gradient of 5 mmHg. There is trivial mitral regurgitation. Left atrial enlargement. Spontaneous echo contrast seen in the left atrium. Stump of the left atrial appendage noted with no thrombus.       The aortic valve is thickened/calcified with decreased leaflet mobility consistent with aortic stenosis. There is trivial aortic insufficiency. Labs were reviewed including labs from other hospital systems through [facility]. Cardiac testing was reviewed including echos, nuclear scans, cardiac catheterization, including from other hospital systems through [facility]. Assessment:    1. Systolic CHF, chronic ([facility]    2. Atrial fibrillation status post cardioversion ([facility]    3. Coronary artery disease due to lipid rich plaque    4. S/P MVR (mitral valve repair)    5. S/P CABG x 3    6. Essential hypertension    7. Hypercholesteremia    8. Shortness of breath    9. Muscle jerks during sleep    10. Hypocalcemia    11. High risk medication use         sCHF  Stable, compensated. No increased SOB. Taking Metoprolol 50mg daily, Torsemide 50 mg BID, Spironolactone 25 mg BID    Echo 4/20/21 Drop in EF to 10-15%. AS not severe per JUDE on 5/4/21. Discussion about ICD with Dr. Clive Rivas. Has F/U on 5/18  JUDE 10/21/20> EF 25%  ECHO 9/15/20> EF 25-30%  JUDE 11/1/2019> EF 35-40%      CAD / CABG x3 8/2018 (LIMA-LAD, SVG-D1 and Posterior  LV branch of RCA  -- Dr. Martina Mejia)  Stable, no anginal symptoms  5/3/21 LHC shows patent grafts and no intervention needed per Dr. Eliz Nielson. AS/MVR 8/2018  Stable  JUDE, on 5/4/21, showed AV area 1.5 opens adequately; not severe AS per Dr. Eliz Nielson. JUDE 10/21/20> The aortic valve is thickened/calcified with decreased leaflet mobility consistent with aortic stenosis. There is trivial aortic insufficiency. Well-seated MV. Mod TR.  ECHO 9/15/20> Mild AS, mild MR. Hypertension  /76   Pulse 72   Ht 5' 8\" (1.727 m)   Wt 197 lb (89.4 kg)   SpO2 92%   BMI 29.95 kg/m²   ~Stable. PAF  HR regular & controlled  F/w EP. AF rate of 140 on 4/15/21 in ED. Started on Amiodarone.  Titrate per EP     MAZE / LR implant 8/2018  RFCA/PVI ablation 10/2019  CV 1/2020 & 10/2020    DVT/PE, h/o  Remains on Coumadin, managed thru [facility] clinic      Plan:  1. Increase Spironolactone to 25mg twice a day  2. START Magnesium 400mg one a day  3. Labs today. 4.  Call if weight is greater than 198 and less than 190.  5. See Violeta in 3-4 weeks. Scribe's attestation: This note was scribed in the presence of Malcom Mishra M.D. by Li Zhu RN     The scribe's documentation has been prepared under my direction and personally reviewed by me in its entirety. I confirm that the note above accurately reflects all work, treatment, procedures, and medical decision making performed by me. Time Based Itemization  A total of 40 minutes was spent on today's patient encounter. If applicable, non-patient-facing activities:  (x ) Preparing to see the patient and reviewing records  (x ) Individual interpretation of results  ( ) Discussion or coordination of care with other health care professionals  ( x) Ordering of unique tests, medications, or procedures  ( x) Documentation within the EHR      I appreciate the opportunity of cooperating in the care of this patient.     Malcom Mishra M.D., [facility]

## 2021-05-07 NOTE — TELEPHONE ENCOUNTER
I would recommend that we follow the hospital discharge medications which include amiodarone twice a day and Lovenox once a day.   She will need to continue monitoring pro time closely as once becomes therapeutic we will need to stop the Lovenox and start adjusting the Coumadin strength

## 2021-05-07 NOTE — TELEPHONE ENCOUNTER
Per [facility]PS[facility] patient needs to take Amiodarone 200 mg 1 tab by mouth 2 times daily until her appointment with Dr. Daniel Taylor in 2 weeks. Trip notified patient's .

## 2021-05-08 LAB — INR BLD: 1.2

## 2021-05-10 ENCOUNTER — HOSPITAL ENCOUNTER (OUTPATIENT)
Age: 76
Discharge: HOME | End: 2021-05-10
Payer: MEDICARE

## 2021-05-10 ENCOUNTER — NURSE ONLY (OUTPATIENT)
Dept: CARDIOLOGY CLINIC | Age: 76
End: 2021-05-10
Payer: MEDICARE

## 2021-05-10 ENCOUNTER — TELEPHONE (OUTPATIENT)
Dept: FAMILY MEDICINE CLINIC | Age: 76
End: 2021-05-10

## 2021-05-10 ENCOUNTER — OFFICE VISIT (OUTPATIENT)
Dept: CARDIOLOGY CLINIC | Age: 76
End: 2021-05-10
Payer: MEDICARE

## 2021-05-10 VITALS
WEIGHT: 197 LBS | SYSTOLIC BLOOD PRESSURE: 106 MMHG | HEART RATE: 72 BPM | BODY MASS INDEX: 29.86 KG/M2 | DIASTOLIC BLOOD PRESSURE: 76 MMHG | OXYGEN SATURATION: 92 % | HEIGHT: 68 IN

## 2021-05-10 DIAGNOSIS — I25.10 CORONARY ARTERY DISEASE DUE TO LIPID RICH PLAQUE: ICD-10-CM

## 2021-05-10 DIAGNOSIS — E83.51 HYPOCALCEMIA: ICD-10-CM

## 2021-05-10 DIAGNOSIS — I48.91 ATRIAL FIBRILLATION STATUS POST CARDIOVERSION (HCC): ICD-10-CM

## 2021-05-10 DIAGNOSIS — E78.00 HYPERCHOLESTEREMIA: ICD-10-CM

## 2021-05-10 DIAGNOSIS — Z95.1 S/P CABG X 3: ICD-10-CM

## 2021-05-10 DIAGNOSIS — Z79.899 HIGH RISK MEDICATION USE: ICD-10-CM

## 2021-05-10 DIAGNOSIS — Z45.09 ENCOUNTER FOR ELECTRONIC ANALYSIS OF REVEAL EVENT RECORDER: ICD-10-CM

## 2021-05-10 DIAGNOSIS — I48.0 PAF (PAROXYSMAL ATRIAL FIBRILLATION) (HCC): ICD-10-CM

## 2021-05-10 DIAGNOSIS — I10 ESSENTIAL HYPERTENSION: ICD-10-CM

## 2021-05-10 DIAGNOSIS — Z98.890 S/P MVR (MITRAL VALVE REPAIR): ICD-10-CM

## 2021-05-10 DIAGNOSIS — I50.22 SYSTOLIC CHF, CHRONIC (HCC): Primary | ICD-10-CM

## 2021-05-10 DIAGNOSIS — R06.02 SHORTNESS OF BREATH: ICD-10-CM

## 2021-05-10 DIAGNOSIS — I50.22 SYSTOLIC CHF, CHRONIC (HCC): ICD-10-CM

## 2021-05-10 DIAGNOSIS — G25.3 MUSCLE JERKS DURING SLEEP: ICD-10-CM

## 2021-05-10 DIAGNOSIS — I25.83 CORONARY ARTERY DISEASE DUE TO LIPID RICH PLAQUE: ICD-10-CM

## 2021-05-10 LAB
ANION GAP SERPL CALCULATED.3IONS-SCNC: 13 MMOL/L (ref 3–16)
BUN BLDV-MCNC: 22 MG/DL (ref 7–20)
CALCIUM SERPL-MCNC: 9.2 MG/DL (ref 8.3–10.6)
CHLORIDE BLD-SCNC: 101 MMOL/L (ref 99–110)
CO2: 29 MMOL/L (ref 21–32)
CREAT SERPL-MCNC: 1.3 MG/DL (ref 0.6–1.2)
GFR AFRICAN AMERICAN: 48
GFR NON-AFRICAN AMERICAN: 40
GLUCOSE BLD-MCNC: 113 MG/DL (ref 70–99)
HCT VFR BLD CALC: 32.9 % (ref 36–48)
HEMOGLOBIN: 10.4 G/DL (ref 12–16)
MAGNESIUM: 1.9 MG/DL (ref 1.8–2.4)
POTASSIUM SERPL-SCNC: 3.5 MMOL/L (ref 3.5–5.1)
PRO-BNP: 3180 PG/ML (ref 0–449)
SODIUM BLD-SCNC: 143 MMOL/L (ref 136–145)

## 2021-05-10 PROCEDURE — 36415 COLL VENOUS BLD VENIPUNCTURE: CPT

## 2021-05-10 PROCEDURE — 99215 OFFICE O/P EST HI 40 MIN: CPT | Performed by: INTERNAL MEDICINE

## 2021-05-10 PROCEDURE — 80151 DRUG ASSAY AMIODARONE: CPT

## 2021-05-10 PROCEDURE — G2066 INTER DEVC REMOTE 30D: HCPCS | Performed by: INTERNAL MEDICINE

## 2021-05-10 PROCEDURE — 93298 REM INTERROG DEV EVAL SCRMS: CPT | Performed by: INTERNAL MEDICINE

## 2021-05-10 PROCEDURE — 85018 HEMOGLOBIN: CPT

## 2021-05-10 PROCEDURE — 83880 ASSAY OF NATRIURETIC PEPTIDE: CPT

## 2021-05-10 PROCEDURE — 85014 HEMATOCRIT: CPT

## 2021-05-10 PROCEDURE — 83735 ASSAY OF MAGNESIUM: CPT

## 2021-05-10 PROCEDURE — 80048 BASIC METABOLIC PNL TOTAL CA: CPT

## 2021-05-10 RX ORDER — MAGNESIUM OXIDE 400 MG/1
400 TABLET ORAL DAILY
Qty: 90 TABLET | Refills: 3 | Status: SHIPPED | OUTPATIENT
Start: 2021-05-10

## 2021-05-10 RX ORDER — TORSEMIDE 100 MG/1
50 TABLET ORAL 2 TIMES DAILY
Qty: 45 TABLET | Refills: 3
Start: 2021-05-10 | End: 2021-07-14

## 2021-05-10 RX ORDER — SPIRONOLACTONE 25 MG/1
25 TABLET ORAL 2 TIMES DAILY
Qty: 60 TABLET | Refills: 3
Start: 2021-05-10 | End: 2021-08-03

## 2021-05-10 NOTE — PATIENT INSTRUCTIONS
Plan:  1. Increase Spironolactone to 25mg twice a day  2. START Magnesium 400mg one a day  3. Labs today   4. Call if weight is greater than 198 and less than 190.  5. See Violeta in 3-4 weeks.

## 2021-05-10 NOTE — LETTER
[address] [facility] [phone]  [address] [facility],[address] [phone]    Pacemaker/Defibrillator Clinic    05/10/21      Fatoumata Vargas  [address] [address]      Dear Fatoumata Vargas    This letter is to inform you that we received the transmission from your monitor at home that checks your implanted heart device. The next date your monitor will automatically transmit will be 6-14-21. If your report needs attention we will notify you. Your device and monitor are wireless and most transmit cellularly, but please periodically check your monitor is still plugged in to the electrical outlet. If you still use the telephone land line to send please ensure the connection to the phone abel is secure. This will help to ensure successful automatic transmissions in the future. Also, the monitor needs to be close to you while sleeping at night. Please be aware that the remote device transmission sites are periodically monitored only during regular business hours during which simultaneous in-office device clinics are being run. If your transmission requires attention, we will contact you as soon as possible. **PLEASE NOTE** that our [facility] policy and processes are changing to ensure a more seamless approach for all parties involved, allowing more time for our nurses to address patient issues and concerns. We will no longer be sending letters for NORMAL remote transmissions. You will be contacted by phone if your transmission requires attention (as previously done), and letters will only be sent regarding monitor disconnections or missed transmissions if you are unable to be reached by phone. Please do not be alarmed by this new process, as we will continue to contact you if your transmission report requires attention. This will be your final \"remote received\" letter.   From this point forward, the [facility] will be utilizing the no news is good news approach. As always, please feel free to contact your nurse with any questions or concerns. Thank you.     Beto [address]

## 2021-05-11 ENCOUNTER — ANTI-COAG VISIT (OUTPATIENT)
Dept: FAMILY MEDICINE CLINIC | Age: 76
End: 2021-05-11

## 2021-05-11 NOTE — PROGRESS NOTES
[address]   Electrophysiology Follow up   Date: 5/18/2021  I had the privilege of visiting Ameya Lowry in the office. CC: ICM  HPI: Ameya Lowry is a 77 y.o. female  female past medical history significant for CAD/CABG in 2018, PAF with WAYNE clip on 8/18 and maze procedure in 2018, ablation of atrial fibrillation, status post ILR, hypertension, hyperlipidemia, DVT/PE on Coumadin and systolic heart failure with severe LV dysfunction.     She was admitted with palpitation tachycardia, found to be in atrial fibrillation. Treated initially with medication. Cardioversion was tried which was unsuccessful. She has been treated with amiodarone.     She has history of severe LV dysfunction. Her ejection fraction on 11/30/2020 was 20%. She has been treated with medical therapy. Repeat echocardiography with ejection fraction of 10 to 15%.     Admitted to [facility] 4/15/[facility]. She was treated with IV milrinone on admission and Lasix drip. She has diuresed well. IV milrinone has been stopped and Lasix drips was also stopped. 5/3/2021 underwent LHC which showed patent grafts      She has moderate aortic stenosis and underwent JUDE by interventional cardiology to assess the valve and does not appear to have severe aortic valve stenosis at this time therefore no intervention was recommended.     She was loaded with amiodarone and then cardioversion was initially attempted which failed. On 5/4/2021 after JUDE cardioversion was done which was successful. Alberta Kelly presents to the office in follow up. She has some tenderness to her left thigh and was concerned about a DVT. Does not appear consistent with DVT, no warmth and she is on coumadin.      Assessment and plan:     - Ischemic cardiomyopathy, severe LV systolic function with EF: 10-15%, NYHAFC III, Stage C on medical therapy   EF per echo 4/20/2021 10-15%   On GMT metoprolol 50 mg daily, torsemide 50 mg BID and spironolactone 25 mg BID   EF decreased longest 6 hours      -hx of DVT/PE   Continue coumadin,      -CKD   Follows with nephrology    -AS   Moderate AS   JUDE  5/4/2021 by :  AV - area ~ 1.5, opens adequately, not severe aortic stenosis    -CAD    [facility] 5/3/2021 showed patent grafts    S/p CABG 2018 with WAYNE clip    Patient Active Problem List    Diagnosis Date Noted    Goiter 09/07/2018    S/P CABG x 3 08/22/2018    Coronary artery disease due to lipid rich plaque     Nonrheumatic mitral valve regurgitation     Encounter for loop recorder check 08/16/2018    Pneumoperitoneum 07/28/2018    PAF (paroxysmal atrial fibrillation) ([address]     Hypertension     Asthma 01/12/2018    Type 2 diabetes mellitus with hyperglycemia, with long-term current use of insulin ([address] 04/27/2017    Primary osteoarthritis of both knees 03/21/2017    Multiple pulmonary nodules 08/01/2016    History of pulmonary embolism     B12 deficiency 09/08/2014    Paroxysmal SVT (supraventricular tachycardia) ([address] 47/76/5788    Eosinophilic gastritis 60/82/6050    Generalized osteoarthrosis, involving multiple sites 03/25/2013    Long term current use of anticoagulant 12/19/2012    Hypercholesteremia 12/19/2012    Chronic diastolic heart failure (HCC)     Hypokalemia     Acute on chronic systolic CHF (congestive heart failure) ([address] 04/26/2021    Hypoglycemia 04/26/2021    Atrial fibrillation with rapid ventricular response (HCC)     Chest pain     Dyspnea on exertion     Acute kidney injury superimposed on CKD (HCC)     Hypotension     Acute on chronic systolic heart failure due to valvular disease ([address] 02/26/2021    Stage 3 chronic kidney disease     Deep vein thrombosis (DVT) of non-extremity vein 12/15/2020    Aortic valve stenosis 11/03/2020    Pneumatosis intestinalis of small intestine 05/10/2020    Ischemic cardiomyopathy 01/20/2020    Occlusion and stenosis of bilateral carotid arteries 01/20/2020    Persistent atrial fibrillation ([address] 10/30/2019    S/P MVR (mitral valve repair)     Thoracic degenerative disc disease 06/07/2019    Chronic systolic CHF (congestive heart failure), NYHA class 3 ([address] 01/15/2019    Obesity, Class I, BMI 30-34.9     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 Ejection fraction is visually estimated to be 10-15 %. E/e'= 23.2 GLS=-3.4   Moderately dilated left ventricle. There is severe diffuse hypokinesis. Indeterminate diastolic function. A bioprosthetic mitral valve is well seated with peak velocity of 1.59m/s   and a mean gradient of 3mmHg. The left atrium is moderately dilated. Mild aortic stenosis with a peak velocity of 2.5m/s and a mean pressure   gradient of 14mmHg. The aortic valve is thickened/calcified with decreased leaflet mobility   consistent with aortic stenosis. Mild to moderate tricuspid regurgitation. Estimated pulmonary artery systolic pressure is mildly to moderately   elevated at 46 mmHg assuming a right atrial pressure of 8 mmHg. Stress test 8/7/2018      Summary    Small sized lateral completely reversible defect of mild intensity    consistent with ischemia in the territory of the LCx and/or LAD .    Normal LV function.    Overall findings represent a intermediate risk scan         I independently reviewed the cardiac diagnostic studies, ECG and relevant imaging studies. Lab Results   Component Value Date    LVEF 13 04/20/2021    LVEFMODE Echo 09/20/2019     Lab Results   Component Value Date    TSH 1.90 04/01/2021         Physical Examination:  Vitals:    05/18/21 1308   BP: 97/63   Pulse: 78   SpO2: 97%      Wt Readings from Last 3 Encounters:   05/18/21 193 lb (87.5 kg)   05/14/21 204 lb (92.5 kg)   05/10/21 197 lb (89.4 kg)       · Constitutional: Oriented. No distress.    · Head: Normocephalic and atraumatic. · Mouth/Throat: Oropharynx is clear and moist.   · Eyes: Conjunctivae normal. EOM are normal.   · Neck: Neck supple. No JVD present. · Cardiovascular: Normal rate, Irregular rhythm, S1&S2. · Pulmonary/Chest: Bilateral respiratory sounds. No rhonchi. · Abdominal: Soft. No tenderness. · Musculoskeletal: No tenderness. No edema    · Lymphadenopathy: Has no cervical adenopathy. · Neurological: Alert and oriented. Follows command, No Gross deficit   · Skin: Skin is warm, No rash noted. · Psychiatric: Has a normal behavior          Review of System:  [x] Full ROS obtained and negative except as mentioned in HPI    Prior to Admission medications    Medication Sig Start Date End Date Taking?  Authorizing Provider   enoxaparin (LOVENOX) 100 MG/ML injection Inject 0.9 mLs into the skin 2 times daily 5/13/21  Yes Pascual rBady MD   spironolactone (ALDACTONE) 25 MG tablet Take 1 tablet by mouth 2 times daily 5/10/21  Yes Mohamud De Anda MD   magnesium oxide (MAG-OX) 400 MG tablet Take 1 tablet by mouth daily 5/10/21  Yes Mohamud De Anda MD   torsemide [facility]) 100 MG tablet Take 0.5 tablets by mouth 2 times daily 5/10/21  Yes Mohamud De Anda MD   amiodarone (CORDARONE) 200 MG tablet Take 1 tablet by mouth 2 times daily 5/5/21  Yes Alicia Gee MD   losartan (COZAAR) 25 MG tablet Take 1 tablet by mouth daily 5/5/21  Yes Alicia Gee MD   metoprolol succinate (TOPROL XL) 50 MG extended release tablet Take 1 tablet by mouth daily 5/5/21  Yes Alicia Gee MD   potassium chloride (KLOR-CON M) 10 MEQ extended release tablet TAKE 1 TABLET DAILY 4/21/21  Yes Keren Aleman MD   montelukast (SINGULAIR) 10 MG tablet TAKE 1 TABLET NIGHTLY 4/21/21  Yes Keren Aleman MD   warfarin (COUMADIN) 5 MG tablet TAKE 1 TABLET DAILY  Patient taking differently: Managed by PCP 4/21/21  Yes Keren Aleman MD   insulin glargine (LANTUS SOLOSTAR) 100 UNIT/ML injection pen INJECT 26 UNITS IN THE MORNING AND 18 UNITS AT BEDTIME  Patient taking differently: INJECT 26 UNITS IN THE MORNING AND 18 UNITS AT BEDTIME (evening when needed) 3/10/21  Yes Amada Olsen MD   exenatide (BYETTA 10 MCG PEN) 10 MCG/0.04ML injection Inject 0.04 mLs into the skin 2 times daily (with meals) 3/3/21  Yes Keren Aleman MD   OXYCODONE HCL PO Take 10 mg by mouth As of 1/21/2021,  states patient is taking 10mg with edge being taken off her pain after 3 hours. Yes Historical Provider, MD   ACETAMINOPHEN PO Take 500 mg by mouth every 6 hours as needed    Yes Historical Provider, MD   blood glucose test strips (FREESTYLE LITE) strip USE TO TEST FOUR TIMES A DAY 1/5/21  Yes Keren Aleman MD   albuterol sulfate  (90 Base) MCG/ACT inhaler USE 2 INHALATIONS EVERY 6 HOURS AS NEEDED FOR WHEEZING 11/17/20  Yes Keren Aleman MD   albuterol (PROVENTIL) (2.5 MG/3ML) 0.083% nebulizer solution Take 3 mLs by nebulization every 6 hours as needed for Wheezing 6/2/20  Yes Keren Aleman MD   polyethylene glycol (GLYCOLAX) 17 GM/SCOOP powder Take 17 g by mouth every other day    Yes Historical Provider, MD   omeprazole (PRILOSEC) 20 MG delayed release capsule Take 20 mg by mouth daily   Yes Historical Provider, MD   FreeStyle Lancets MISC 1 each by Does not apply route 4 times daily 4/29/20  Yes Keren Aleman MD   ondansetron (ZOFRAN) 4 MG tablet Take 1 tablet by mouth 3 times daily as needed for Nausea or Vomiting 3/26/20  Yes Keren Aleman MD   Blood Glucose Monitoring Suppl (EMBRACE PRO GLUCOSE METER) GAETANO 1 Device by Does not apply route 4 times daily 2/4/20  Yes Keren Aleman MD   HUMALOG KWIKPEN 100 UNIT/ML SOPN TAKE AS INSTRUCTED BY YOUR PRESCRIBER  Patient taking differently:  Only if high blood sugar-has not been using 12/30/19  Yes Keren Aleman MD   nystatin (MYCOSTATIN) 051344 UNIT/ML suspension TAKE ONE TEASPOONFUL BY MOUTH FOUR TIMES A DAY FOR 5 DAYS 11/20/19  Yes Hung Dan MD Alfred   aspirin 81 MG chewable tablet Take 1 tablet by mouth daily 6/7/19  Yes Fabi Lockwood MD   BD PEN NEEDLE JANNET U/F 32G X 4 MM MISC USE 1 PEN NEEDLE FOUR TIMES A DAY 3/22/19  Yes Fabi Lockwood MD   vitamin B-12 500 MCG tablet Take 1 tablet by mouth daily 10/12/18  Yes Gregorio Quinteros MD       Allergies   Allergen Reactions    Amiuhpsf-Fonxabt-Sohvcl [Fluocinolone] Shortness Of Breath    Ciprofloxacin Shortness Of Breath    Diovan [Valsartan] Shortness Of Breath    Flagyl [Metronidazole] Shortness Of Breath     Has taken diflucan at home 12/7/15    Metformin And Related [Metformin And Related] Shortness Of Breath    Benazepril      Other reaction(s): Not Recorded    Morphine      Bad reaction. \"makes her feel horrible\".  Saxagliptin      Other reaction(s): Not Recorded    Levaquin [Levofloxacin] Rash       Social History:  Reviewed. reports that she has never smoked. She has never used smokeless tobacco. She reports that she does not drink alcohol and does not use drugs. Family History:  Reviewed. Reviewed. No family history of SCD. Relevant and available labs, and cardiovascular diagnostics reviewed. Reviewed. I independently reviewed relevant and available cardiac diagnostic tests ECG, CXR, Echo, Stress test, Device interrogation, Holter, CT scan. Complex medical condition with multiple medical problems affecting prognosis and outcome of EP interventions      All questions and concerns were addressed to the patient/family. Alternatives to my treatment were discussed. I have discussed the above stated plan and the patient verbalized understanding and agreed with the plan. Scribe attestation: This note was scribed in the presence of Dee Castano MD by Nathan Johnson RN  Physician Attestation: I, Dr. Dee Castano, confirm that the scribe's documentation has been prepared under my direction and personally reviewed by me in its entirety.   I also confirm that the

## 2021-05-12 ENCOUNTER — CARE COORDINATION (OUTPATIENT)
Dept: CASE MANAGEMENT | Age: 76
End: 2021-05-12

## 2021-05-12 ENCOUNTER — TELEPHONE (OUTPATIENT)
Dept: FAMILY MEDICINE CLINIC | Age: 76
End: 2021-05-12

## 2021-05-12 LAB
AMIODARONE LEVEL: 0.6 UG/ML (ref 0.5–2)
DES-AMIOD: 0.3 UG/ML
INR BLD: 1.5

## 2021-05-12 NOTE — TELEPHONE ENCOUNTER
Niki Blancas from [place] [address] [phone] called to report a out of range rmi result.     Test date 05/12/210 @ 9:00am rmi result is 1.5

## 2021-05-12 NOTE — TELEPHONE ENCOUNTER
Per Dr. Yuli Estrada she needs to recheck every 2 days and still take Lovenox until she reaches between 2.0-3.0

## 2021-05-12 NOTE — CARE COORDINATION
[address] Transitions Follow Up Call    2021    Patient: Tootie Edwards  Patient : 1945   MRN: <[id number]>  Reason for Admission: HF, TORIBIO  Discharge Date: 21 RARS: Readmission Risk Score: 44         Spoke with: Tootie Edwards who reports that she is doing better today. Patient states that she was having some back issues last night but states that is a different story. Patient reports that she is taking all medications as ordered. Today weight is 196 and baseline wt is 194. Doctor has given patient perimeter to call office with wt gain at 198 or wt loss at 190 and patient is aware. Patient reports BP today 103/58 and HR 61 and states that that is about her average for pass week. Patient deny and pain, sob, cp, dizziness or n/v. Patient still working on getting INR to therapeutic range between 2 - 3 currently at 1.5. Patient reports that she is following the no salt diet and 64 oz of fluid restriction. Patient deny any needs at this time. Next Outreach in 5 -7 days. Care Transitions Subsequent and Final Call    Subsequent and Final Calls  Do you have any ongoing symptoms?: No  Have your medications changed?: No  Do you have any questions related to your medications?: No  Do you currently have any active services?: Yes  Are you currently active with any services?: Home Health  Do you have any needs or concerns that I can assist you with?: No  Care Transitions Interventions  Other Interventions:            Follow Up  Future Appointments   Date Time Provider Tawana Young   2021  1:00 PM SCHEDULE, [place] DEVICE CHECK [facility]   2021  1:00 PM Snehal Baird MD [facility]   2021  8:30 AM SOLEDAD Bonilla - CNS [facility]   2021 11:00 AM SCHEDULE, [place] REMOTE TRANSMISSION [facility]   2021 11:00 AM SCHEDULE, [place] REMOTE TRANSMISSION [facility]   2021 11:00 AM SCHEDULE, [place] REMOTE TRANSMISSION [facility]   2021 11:00 AM SCHEDULE, [place] REMOTE TRANSMISSION FF Cardio [facility]   11/1/2021 11:00 AM SCHEDULE, [place] REMOTE TRANSMISSION FF Cardio [facility]   12/6/2021 11:00 AM SCHEDULE, [facility] TRANSMISSION  Cardio [facility]       Nela Justice LPN

## 2021-05-12 NOTE — CARE COORDINATION
[address] Transitions Follow Up Call    2021    Patient: Clau Ca  Patient : 1945   MRN: [id number]  Reason for Admission: HF, TORIBIO  Discharge Date: 21 RARS: Readmission Risk Score: 44         Spoke with:  \"Murphy\"    Care Transitions Subsequent and Final Call    Subsequent and Final Calls  Do you have any ongoing symptoms?: No  Have your medications changed?: No  Do you have any questions related to your medications?: No  Do you currently have any active services?: Yes  Are you currently active with any services?: Home Health  Do you have any needs or concerns that I can assist you with?: No  Identified Barriers: None  Care Transitions Interventions  Other Interventions: Follow Up: Patient is declining to speak with CTN stating \"we just got a call from there\". CTN explained that a call could be placed at another time and  agreed to speak with CTN. Patient is doing \"OK\", had some back pain during the night, is taking pain medication for the pain. Jesus Eli RN is coming and checking blood work, patient has a home INR testing kit, it was 1.5 today. She is weighing self daily and denies any swelling, cough, SOB, chest pain, fever. She will follow up with cardiology 21. CTN will continue with outreach follow up calls.     Future Appointments   Date Time Provider Tawana Young   2021  1:00 PM SCHEDULE, [place] DEVICE CHECK [facility]   2021  1:00 PM Chiara Park MD [facility]   2021  8:30 AM SOLEDAD Galo [facility]   2021 11:00 AM SCHEDULE, [place] REMOTE TRANSMISSION FF [facility]   2021 11:00 AM SCHEDULE, [place] REMOTE TRANSMISSION FF [facility]   2021 11:00 AM SCHEDULE, [place] REMOTE TRANSMISSION FF [facility]   2021 11:00 AM SCHEDULE, [place] REMOTE TRANSMISSION FF [facility]   2021 11:00 AM SCHEDULE, [place] REMOTE TRANSMISSION FF [facility]   2021 11:00 AM SCHEDULE,

## 2021-05-13 ENCOUNTER — TELEPHONE (OUTPATIENT)
Dept: CARDIOLOGY CLINIC | Age: 76
End: 2021-05-13

## 2021-05-13 NOTE — TELEPHONE ENCOUNTER
Since BP 98/61 this morning, should she still take the torsemide and losartan later in the day? Patient is afraid her BP will go to low.   Please advise

## 2021-05-13 NOTE — TELEPHONE ENCOUNTER
Last OV 5/10/21 ANDREI  Spoke to the pt's /EC-asking to speak to JOSE L Mcdaniel. Pt is worried about taking the Torsemide, with the BP so low.

## 2021-05-13 NOTE — TELEPHONE ENCOUNTER
Patient notified of WM message from yesterday, she states there are only 3 days left of the lovenox. Pt is wanting to know if she should be taking more than the 3 days that are left or if she should stop the lovenox after it is completed and just take coumadin. Please advise.

## 2021-05-13 NOTE — TELEPHONE ENCOUNTER
Spoke to patient and spouse she will take both doses of torsemide today and hold the losartan. She will call tomorrow with weight and BP readings.

## 2021-05-13 NOTE — TELEPHONE ENCOUNTER
Pt  calling, pt was told to call if weight goes above 198 lbs. Her weight today was 198.6 lbs. Her BP has been low last night it was 105/51 today is 98/61. Needs to know what to do?  Pls call to advise Thank you

## 2021-05-13 NOTE — TELEPHONE ENCOUNTER
Take the torsemide and then take the losartan later in the day    In other words, dont take torsemide and losartan same time  If they need me I am a phone call away

## 2021-05-13 NOTE — TELEPHONE ENCOUNTER
Patient and her  have been notified, would like refill sent to [facility] [address] within [facility].

## 2021-05-14 ENCOUNTER — ANTI-COAG VISIT (OUTPATIENT)
Dept: FAMILY MEDICINE CLINIC | Age: 76
End: 2021-05-14

## 2021-05-14 ENCOUNTER — OFFICE VISIT (OUTPATIENT)
Dept: FAMILY MEDICINE CLINIC | Age: 76
End: 2021-05-14
Payer: MEDICARE

## 2021-05-14 ENCOUNTER — TELEPHONE (OUTPATIENT)
Dept: CARDIOLOGY CLINIC | Age: 76
End: 2021-05-14

## 2021-05-14 VITALS
BODY MASS INDEX: 31.02 KG/M2 | WEIGHT: 204 LBS | SYSTOLIC BLOOD PRESSURE: 88 MMHG | TEMPERATURE: 97.7 F | DIASTOLIC BLOOD PRESSURE: 58 MMHG | OXYGEN SATURATION: 100 % | HEART RATE: 86 BPM

## 2021-05-14 DIAGNOSIS — E53.8 B12 DEFICIENCY: ICD-10-CM

## 2021-05-14 DIAGNOSIS — R07.9 CHEST PAIN, UNSPECIFIED TYPE: ICD-10-CM

## 2021-05-14 DIAGNOSIS — I50.32 CHRONIC DIASTOLIC HEART FAILURE (HCC): ICD-10-CM

## 2021-05-14 DIAGNOSIS — Z79.4 TYPE 2 DIABETES MELLITUS WITH HYPERGLYCEMIA, WITH LONG-TERM CURRENT USE OF INSULIN (HCC): ICD-10-CM

## 2021-05-14 DIAGNOSIS — I50.23 ACUTE ON CHRONIC SYSTOLIC CHF (CONGESTIVE HEART FAILURE) (HCC): ICD-10-CM

## 2021-05-14 DIAGNOSIS — I48.91 ATRIAL FIBRILLATION WITH RAPID VENTRICULAR RESPONSE (HCC): Primary | ICD-10-CM

## 2021-05-14 DIAGNOSIS — Z79.01 LONG TERM CURRENT USE OF ANTICOAGULANT: ICD-10-CM

## 2021-05-14 DIAGNOSIS — I25.10 CORONARY ARTERY DISEASE DUE TO LIPID RICH PLAQUE: ICD-10-CM

## 2021-05-14 DIAGNOSIS — I25.83 CORONARY ARTERY DISEASE DUE TO LIPID RICH PLAQUE: ICD-10-CM

## 2021-05-14 DIAGNOSIS — E11.65 TYPE 2 DIABETES MELLITUS WITH HYPERGLYCEMIA, WITH LONG-TERM CURRENT USE OF INSULIN (HCC): ICD-10-CM

## 2021-05-14 PROCEDURE — 99496 TRANSJ CARE MGMT HIGH F2F 7D: CPT | Performed by: FAMILY MEDICINE

## 2021-05-14 ASSESSMENT — PATIENT HEALTH QUESTIONNAIRE - PHQ9
SUM OF ALL RESPONSES TO PHQ9 QUESTIONS 1 & 2: 0
SUM OF ALL RESPONSES TO PHQ QUESTIONS 1-9: 0
SUM OF ALL RESPONSES TO PHQ QUESTIONS 1-9: 0
2. FEELING DOWN, DEPRESSED OR HOPELESS: 0

## 2021-05-14 NOTE — PROGRESS NOTES
Subjective:      Patient ID: Tootie Edwards is a 77 y.o. female. CC: Patient presents for hospital follow-up. HPI patient presents for hospital follow-up and accompanied by her . Patient was hospitalized at [facility] April 15 through May 5. She initially presented to the hospital with atrial fibrillation with a rapid ventricular response, she developed acute on chronic heart failure and acute on chronic kidney injury, has superficial phlebitis of the right forearm and persistent atrial fibrillation. Patient initially was started on medical management of the atrial fibrillation. She did develop acute heart failure and ejection fraction was found to be 10 to 50%. Her ejection fraction is been reduced over the last year from 25% down to 15%. She was diuresed using Lasix and amiodarone medication. She lost 15 L of fluid during the hospitalization. She was then started on oral torsemide and spironolactone and maintained on losartan and metoprolol medication was restarted. During hospitalization she had a cardioversion but then went back to atrial fibrillation. She was then started on amiodarone and increased to 8 mg twice a day. She was off anticoagulation during the procedure and subsequently started back on Lovenox twice a day but then because of increasing creatinine her Lovenox dosage was decreased down to milligram per kilogram daily and she was on Coumadin management. Patient's INR has slowly increased as she is now taking 5 mg of Coumadin daily with her INR today of 1.8. Transesophageal echo demonstrated moderate aortic stenosis. Kidney function had a creatinine up to 1.9 by discharge was down to 1.5 and seemed to stabilize at that number. Since hospitalization she was evaluated by Dr. Marily Camargo and spironolactone was increased to 25 mg twice a day along with starting magnesium pills.     Diabetic management was decently controlled during hospitalization and she had a hemoglobin A1c of 6.4. Patient has noted that her weight is slowly increasing from her hospitalization weight and she is now having ankle edema as well. Care Coordinator phone contact documented in Epic note for May 12, 2021.        Review of Systems     Patient Active Problem List   Diagnosis    Long term current use of anticoagulant    Hypercholesteremia    Generalized osteoarthrosis, involving multiple sites    Eosinophilic gastritis    Paroxysmal SVT (supraventricular tachycardia) ([facility])    B12 deficiency    History of pulmonary embolism    Multiple pulmonary nodules    Type 2 diabetes mellitus with hyperglycemia, with long-term current use of insulin ([facility])    Asthma    Pneumoperitoneum    PAF (paroxysmal atrial fibrillation) ([facility] [address]    Hypertension    Cardiac arrhythmia    Encounter for loop recorder check    Coronary artery disease due to lipid rich plaque    Nonrheumatic mitral valve regurgitation    S/P CABG x 3    Goiter    Primary osteoarthritis of both knees    Splenic infarct    Obesity, Class I, BMI 30-34.9    Chronic systolic CHF (congestive heart failure), NYHA class 3 ([facility])    Thoracic degenerative disc disease    Persistent atrial fibrillation ([facility])    S/P MVR (mitral valve repair)    Ischemic cardiomyopathy    Occlusion and stenosis of bilateral carotid arteries    Pneumatosis intestinalis of small intestine    Aortic valve stenosis    Deep vein thrombosis (DVT) of non-extremity vein    Acute on chronic systolic heart failure due to valvular disease ([facility])    Stage 3 chronic kidney disease    Coronary artery disease involving native heart without angina pectoris    Hypotension    Atrial fibrillation with rapid ventricular response ([facility])    Chest pain    Dyspnea on exertion    Acute kidney injury superimposed on CKD ([facility])    Acute on chronic systolic CHF (congestive heart failure) ([facility])    Hypoglycemia    Chronic diastolic heart failure ([facility])    Hypokalemia Outpatient Medications Marked as Taking for the 5/14/21 encounter (Office Visit) with German Hamm MD   Medication Sig Dispense Refill    enoxaparin (LOVENOX) 100 MG/ML injection Inject 0.9 mLs into the skin 2 times daily 10 Syringe 0    spironolactone (ALDACTONE) 25 MG tablet Take 1 tablet by mouth 2 times daily 60 tablet 3    magnesium oxide (MAG-OX) 400 MG tablet Take 1 tablet by mouth daily 90 tablet 3    torsemide (DEMADEX) 100 MG tablet Take 0.5 tablets by mouth 2 times daily 45 tablet 3    amiodarone (CORDARONE) 200 MG tablet Take 1 tablet by mouth 2 times daily 30 tablet 1    losartan (COZAAR) 25 MG tablet Take 1 tablet by mouth daily 30 tablet 3    metoprolol succinate (TOPROL XL) 50 MG extended release tablet Take 1 tablet by mouth daily 30 tablet 3    potassium chloride (KLOR-CON M) 10 MEQ extended release tablet TAKE 1 TABLET DAILY 90 tablet 0    montelukast (SINGULAIR) 10 MG tablet TAKE 1 TABLET NIGHTLY 90 tablet 0    warfarin (COUMADIN) 5 MG tablet TAKE 1 TABLET DAILY (Patient taking differently: Managed by PCP) 100 tablet 0    insulin glargine (LANTUS SOLOSTAR) 100 UNIT/ML injection pen INJECT 26 UNITS IN THE MORNING AND 18 UNITS AT BEDTIME (Patient taking differently: INJECT 26 UNITS IN THE MORNING AND 18 UNITS AT BEDTIME (evening when needed)) 90 mL 1    exenatide (BYETTA 10 MCG PEN) 10 MCG/0.04ML injection Inject 0.04 mLs into the skin 2 times daily (with meals) 3 pen 1    OXYCODONE HCL PO Take 10 mg by mouth As of 1/21/2021,  states patient is taking 10mg with edge being taken off her pain after 3 hours.       ACETAMINOPHEN PO Take 500 mg by mouth every 6 hours as needed       blood glucose test strips (FREESTYLE LITE) strip USE TO TEST FOUR TIMES A  strip 4    albuterol sulfate  (90 Base) MCG/ACT inhaler USE 2 INHALATIONS EVERY 6 HOURS AS NEEDED FOR WHEEZING 25.5 g 2    albuterol (PROVENTIL) (2.5 MG/3ML) 0.083% nebulizer solution Take 3 mLs by nebulization every 6 hours as needed for Wheezing 120 each 3    polyethylene glycol (GLYCOLAX) 17 GM/SCOOP powder Take 17 g by mouth every other day       omeprazole (PRILOSEC) 20 MG delayed release capsule Take 20 mg by mouth daily      FreeStyle Lancets MISC 1 each by Does not apply route 4 times daily 200 each 5    ondansetron (ZOFRAN) 4 MG tablet Take 1 tablet by mouth 3 times daily as needed for Nausea or Vomiting 30 tablet 0    Blood Glucose Monitoring Suppl (EMBRACE PRO GLUCOSE METER) GAETANO 1 Device by Does not apply route 4 times daily 1 Device 0    HUMALOG KWIKPEN 100 UNIT/ML SOPN TAKE AS INSTRUCTED BY YOUR PRESCRIBER (Patient taking differently: Only if high blood sugar-has not been using) 15 mL 8    nystatin (MYCOSTATIN) 293868 UNIT/ML suspension TAKE ONE TEASPOONFUL BY MOUTH FOUR TIMES A DAY FOR 5 DAYS 1 Bottle 2    aspirin 81 MG chewable tablet Take 1 tablet by mouth daily 30 tablet 3    BD PEN NEEDLE JANNET U/F 32G X 4 MM MISC USE 1 PEN NEEDLE FOUR TIMES A  each 3    vitamin B-12 500 MCG tablet Take 1 tablet by mouth daily 30 tablet 3       Allergies   Allergen Reactions    [place]-Schufuw-Ylzkml [Fluocinolone] Shortness Of Breath    Ciprofloxacin Shortness Of Breath    Diovan [Valsartan] Shortness Of Breath    Flagyl [Metronidazole] Shortness Of Breath     Has taken diflucan at home 12/7/15    Metformin And Related [Metformin And Related] Shortness Of Breath    Benazepril      Other reaction(s): Not Recorded    Morphine      Bad reaction. \"makes her feel horrible\".      Saxagliptin      Other reaction(s): Not Recorded    Levaquin [Levofloxacin] Rash       Social History     Tobacco Use    Smoking status: Never Smoker    Smokeless tobacco: Never Used   Substance Use Topics    Alcohol use: No       BP (!) 88/58 (Site: Left Upper Arm, Position: Sitting, Cuff Size: Large Adult)   Pulse 86   Temp 97.7 °F (36.5 °C) (Infrared)   Wt 204 lb (92.5 kg)   SpO2 100%   BMI 31.02 kg/m² Objective:   Physical Exam  Constitutional:       General: She is not in acute distress. Appearance: She is well-developed. Neck:      Vascular: No carotid bruit. Cardiovascular:      Rate and Rhythm: Normal rate and regular rhythm. Pulses:           Dorsalis pedis pulses are 2+ on the right side and 2+ on the left side. Posterior tibial pulses are 2+ on the right side and 2+ on the left side. Heart sounds: Normal heart sounds. No murmur. No friction rub. No gallop. Pulmonary:      Effort: Pulmonary effort is normal.      Breath sounds: Normal breath sounds. Abdominal:      General: Bowel sounds are normal. There is no distension. Palpations: Abdomen is soft. Tenderness: There is no abdominal tenderness. Musculoskeletal: Normal range of motion. General: No tenderness. Right lower leg: Edema (2 plus edema left greater than right) present. Left lower leg: Edema present. Skin:     Coloration: Pallor: mplexity. Neurological:      Mental Status: She is alert and oriented to person, place, and time. Sensory: Sensation is intact. Motor: Motor function is intact. Psychiatric:         Behavior: Behavior is cooperative. Assessment:      Adelaida Draper was seen today for follow-up from hospital.    Diagnoses and all orders for this visit:    Atrial fibrillation with rapid ventricular response (HCC)    Acute on chronic systolic CHF (congestive heart failure) ([facility] [address]    Type 2 diabetes mellitus with hyperglycemia, with long-term current use of insulin (HCC)    B12 deficiency  -     Vitamin B12 & Folate;  Future    Chest pain, unspecified type    Chronic diastolic heart failure (HCC)    Coronary artery disease due to lipid rich plaque    Long term current use of anticoagulant            Plan:      Hospital information reviewed with patient and     Recommend she stay on the 5 mg of Coumadin until her INR is therapeutic and then she may resume her normal dose of Coumadin which is 2.5 mg 2 days a week and 5 mg other days. They are going to continue doing protimes at home every 3 days. Once her Coumadin level is therapeutic she can come off the Lovenox medication. Patient is to take midodrine 2.5 mg a day and continue to monitor her weight closely. She is very sensitive to the midodrine medication as she received 5 mg 2 days in a row in the hospital and lost 15 L. She does have appointment set up with cardiology in the near future to determine whether she should have an AICD installed. Continue to monitor diet closely and minimize salt in her diet    RTC 1 month    Medical decision making of high complexity          Please note that this chart was generated using Dragon dictation software. Although every effort was made to ensure the accuracy of this automated transcription, some errors in transcription may have occurred.

## 2021-05-14 NOTE — TELEPHONE ENCOUNTER
Pt  calling to report Vitals for ANDREI. Pt weight today is 200.4 lbs and BP today is 125/66 P62.  Pls call to advise Thank you

## 2021-05-17 ENCOUNTER — TELEPHONE (OUTPATIENT)
Dept: FAMILY MEDICINE CLINIC | Age: 76
End: 2021-05-17

## 2021-05-17 NOTE — TELEPHONE ENCOUNTER
[facility] Provider signed orders and has been faxed to home care agency.

## 2021-05-18 ENCOUNTER — NURSE ONLY (OUTPATIENT)
Dept: CARDIOLOGY CLINIC | Age: 76
End: 2021-05-18
Payer: MEDICARE

## 2021-05-18 ENCOUNTER — TELEPHONE (OUTPATIENT)
Dept: CARDIOLOGY CLINIC | Age: 76
End: 2021-05-18

## 2021-05-18 ENCOUNTER — OFFICE VISIT (OUTPATIENT)
Dept: CARDIOLOGY CLINIC | Age: 76
End: 2021-05-18
Payer: MEDICARE

## 2021-05-18 VITALS
BODY MASS INDEX: 29.25 KG/M2 | HEART RATE: 78 BPM | WEIGHT: 193 LBS | SYSTOLIC BLOOD PRESSURE: 97 MMHG | HEIGHT: 68 IN | DIASTOLIC BLOOD PRESSURE: 63 MMHG | OXYGEN SATURATION: 97 %

## 2021-05-18 DIAGNOSIS — I48.0 PAF (PAROXYSMAL ATRIAL FIBRILLATION) (HCC): ICD-10-CM

## 2021-05-18 DIAGNOSIS — I47.1 PAROXYSMAL SVT (SUPRAVENTRICULAR TACHYCARDIA) (HCC): ICD-10-CM

## 2021-05-18 DIAGNOSIS — I48.91 ATRIAL FIBRILLATION WITH RAPID VENTRICULAR RESPONSE (HCC): ICD-10-CM

## 2021-05-18 DIAGNOSIS — I25.83 CORONARY ARTERY DISEASE DUE TO LIPID RICH PLAQUE: ICD-10-CM

## 2021-05-18 DIAGNOSIS — Z45.09 ENCOUNTER FOR ELECTRONIC ANALYSIS OF REVEAL EVENT RECORDER: ICD-10-CM

## 2021-05-18 DIAGNOSIS — I48.0 PAF (PAROXYSMAL ATRIAL FIBRILLATION) (HCC): Primary | ICD-10-CM

## 2021-05-18 DIAGNOSIS — I25.5 ISCHEMIC CARDIOMYOPATHY: ICD-10-CM

## 2021-05-18 DIAGNOSIS — I25.10 CORONARY ARTERY DISEASE DUE TO LIPID RICH PLAQUE: ICD-10-CM

## 2021-05-18 DIAGNOSIS — I82.90 ACUTE DEEP VEIN THROMBOSIS (DVT) OF NON-EXTREMITY VEIN: ICD-10-CM

## 2021-05-18 DIAGNOSIS — I50.23 ACUTE ON CHRONIC SYSTOLIC CHF (CONGESTIVE HEART FAILURE) (HCC): ICD-10-CM

## 2021-05-18 PROCEDURE — 93285 PRGRMG DEV EVAL SCRMS IP: CPT | Performed by: INTERNAL MEDICINE

## 2021-05-18 PROCEDURE — 99215 OFFICE O/P EST HI 40 MIN: CPT | Performed by: INTERNAL MEDICINE

## 2021-05-18 NOTE — PATIENT INSTRUCTIONS
You are scheduled for a Single chamber ICD implant     Our  will call you to discuss a date for you procedure. The Cath Lab will call you a week before your procedure. The night before your procedure you will need to scrub with Hibiclens wash. The day of your procedure you will need to check in at the registration desk, which is in the main lobby at [address] [place] [address] will need to fast for at least 8 hours prior to you procedure. You will need  to hold Coumadin for 2 days before    You may take all other medications with a sip of water the morning of your procedure. You will be staying overnight. Please have a responsible adult to drive you home upon discharge. The discharging unit will be giving you discharge instructions. If you have any questions regarding your procedure itself or your medications, please call [phone] and ask to talk to an EP nurse. You will be seen in the office in 1 week for a wound check and then 3 months following implantation. Post-Device Implant     1. Wound Care  -Bathe daily but keep incision dry and clean until your 7-10 day follow up  -Do not shower until you are told to do so by your physician.  -Do not remove the outer dressing unless it is soiled  -Allow the thin pieces of tape (Steri-Strips) to fall off naturally. Do not pick or pull at these unless instructed to do so by your physician. 2. Contact the cardiologists office for these concerns:   -Increased swelling and/or tenderness in incision and/or extending down the arm on the same side as implant site   -Feeling increased palpitations or irregular heart beat   -Redness of incision or drainage from incision.   -Bleeding that does not stop or increases.  -Skin pimples along incision.  -If a suture works its way through the incision.  -Fever greater than 100.5    3. Do not rub or twist the device site    4.  Avoid lifting objects heavier than 10 pounds for one month. Do not raise the arm on the side where the device was implanted over your head for one month. These rules help to heal the implant site and stabilize the heart lead wires. 5. Avoid tight clothing over the incision. 6. No driving for one week or until initial visit after your procedure. 7. Carry your temporary Device  I.D. Card with you until your permanent card arrives in four to six weeks. An I. D. Card should be with you always. 8. If you have a defibrillator (AICD) and receive a shock or hear a tone from the device call the doctor's office    9. An implanted device does not replace any medications, diet or activity restrictions that you had before the implant. Check with your cardiologist regarding questions    10. Reasons to seek medical attention immediately: Call 911   -Sudden onset of chest pain, shortness of breath, dizziness, or loss of balance or coordination   -Sudden Change in vision   -Sudden confusion or trouble speaking and/or understanding    -Sudden onset of numbness or weakness of face/arm/leg, especially on one side of the body   -Sudden or severe headache without known cause   -Nausea with uncontrolled vomiting   -Severe bleeding

## 2021-05-18 NOTE — LETTER
[facility]  EP Procedure Sheet    5/18/21  Anthony Law  1945  EP Procedures   Pacemaker implant (single/dual)  EP Study   xxxx ICD implant (single)  Atrial flutter ablation (JUDE Y/N)    Biv implant ICD  Tilt Table    Biv implant PPM  Atrial fibrillation ablation (JUDE Yes)    Generator Change (PPM/ICD/BiV)  SVT ablation    Lead revision (RV/LA/RA) (<1 month)  VT ablation      Lead extraction +/- upgrade (BiV/PPM/ICD)  VT Ischemic/ non-ischemic   xxx Loop removal  VT RVOT    Cardioversion  VT Left sided    JUDE  AVN ablation     Equipment   Medtronic   MONALISA Mapping System    [place]  Carto Mapping System   xxx [employer]  CryoAblation    Biotronik  Laser Lead Extraction     EP Procedures Scheduling Request  # hours Requested   Scheduled  Date:   Specific Day  Completed    Anesthesia  F/u Date:   CT surgery backup  COVID     Overnight stay      Location [facility]       Pre-Procedure Labs / Imaging   PT/INR  Type & cross    CBC  Units PRBC    BMP/Mg  Units FFP    Venogram  Cardiac CTA for Pulmonary vein mapping     RN INITIALS: RA    Patient Instructions  Do not eat or drink after midnight the night prior to procedure  Dx:Ischemic Cardiomyopathy  Hold Coumadin for 2 day/s prior

## 2021-05-18 NOTE — TELEPHONE ENCOUNTER
Patient has an appt today with SAHIL at 1pm .  calling to see if SAHIL wants her to have a test today because he REALLY thinks she has a blood clot in her upper right thigh near where her cath was done . The area on right leg is very very sore and she can feel a lump. The lump is not red or hot but very sore . Also she was told to call if her weight was under 190 pounds and today she weighs 189.4 pounds .  Please call to advise

## 2021-05-18 NOTE — PROGRESS NOTES
Patient comes in for interrogation of their implanted loop recorder. Interrogation shows AF with a 15.4% burden. Some noise noted. Today we turned AF episodes to All. Implanted for palpitations. Known AF. Patient remains on warfarin, amiodarone and metoprolol. Please see interrogation for more detail. Patient will see Dr. Brandt Loza today and we will continue to follow the Patient remotely.

## 2021-05-19 RX ORDER — AMIODARONE HYDROCHLORIDE 200 MG/1
200 TABLET ORAL 2 TIMES DAILY
Qty: 60 TABLET | Refills: 2 | Status: ON HOLD | OUTPATIENT
Start: 2021-05-19 | End: 2021-07-07 | Stop reason: SDUPTHER

## 2021-05-20 ENCOUNTER — CARE COORDINATION (OUTPATIENT)
Dept: CASE MANAGEMENT | Age: 76
End: 2021-05-20

## 2021-05-20 ENCOUNTER — HOSPITAL ENCOUNTER (OUTPATIENT)
Age: 76
Setting detail: SPECIMEN
Discharge: HOME | End: 2021-05-20
Payer: MEDICARE

## 2021-05-20 ENCOUNTER — TELEPHONE (OUTPATIENT)
Dept: FAMILY MEDICINE CLINIC | Age: 76
End: 2021-05-20

## 2021-05-20 LAB
ANION GAP SERPL CALCULATED.3IONS-SCNC: 11 MMOL/L (ref 3–16)
BUN BLDV-MCNC: 27 MG/DL (ref 7–20)
CALCIUM SERPL-MCNC: 8.8 MG/DL (ref 8.3–10.6)
CHLORIDE BLD-SCNC: 97 MMOL/L (ref 99–110)
CO2: 27 MMOL/L (ref 21–32)
CREAT SERPL-MCNC: 1.6 MG/DL (ref 0.6–1.2)
GFR AFRICAN AMERICAN: 38
GFR NON-AFRICAN AMERICAN: 31
GLUCOSE BLD-MCNC: 156 MG/DL (ref 70–99)
POTASSIUM SERPL-SCNC: 4.2 MMOL/L (ref 3.5–5.1)
SODIUM BLD-SCNC: 135 MMOL/L (ref 136–145)

## 2021-05-20 PROCEDURE — 36415 COLL VENOUS BLD VENIPUNCTURE: CPT

## 2021-05-20 PROCEDURE — 80048 BASIC METABOLIC PNL TOTAL CA: CPT

## 2021-05-20 NOTE — TELEPHONE ENCOUNTER
Unable to find med on current or history list. Pt advise and stated that they can wait till [address] gets back as is not something she takes daily

## 2021-05-20 NOTE — CARE COORDINATION
Iván 45 Transitions Follow Up Call    2021    Patient: Antonieta Nichole  Patient : 1945   MRN: [id number]  Reason for Admission: HF, TORIBIO  Discharge Date: 21 RARS: Readmission Risk Score: 44         Spoke with: Cara 28 Transitions Subsequent and Final Call    Subsequent and Final Calls  Do you have any ongoing symptoms?: No  Have your medications changed?: No  Do you have any questions related to your medications?: No  Do you currently have any active services?: Yes  Are you currently active with any services?: Home Health  Do you have any needs or concerns that I can assist you with?: No  Identified Barriers: None  Care Transitions Interventions  Other Interventions: Follow Up:  reports that patient had a lot of back pain during the night, she is feeling better today. [facility] nurse was just there to visit. Patient denies edema, cough, chest pain, does have SOB with exertion. Cardiology follow up is scheduled for 21. CTN will continue with outreach follow up calls.     Future Appointments   Date Time Provider Tawana Young   2021  8:30 AM SOLEDAD Gurrola - [facility] FF Cardio MMA   2021  8:00 AM Alla Lewis MD [facility]   2021 11:00 AM SCHEDULE, [place] REMOTE TRANSMISSION FF Cardio MMA   2021 11:00 AM SCHEDULE, [place] REMOTE TRANSMISSION FF Cardio MMA   2021 11:00 AM SCHEDULE, [place] REMOTE TRANSMISSION FF Cardio MMA   2021 11:00 AM SCHEDULE, [place] REMOTE TRANSMISSION FF Cardio MMA   2021 11:00 AM SCHEDULE, [place] REMOTE TRANSMISSION FF Cardio MMA   2021 11:00 AM SCHEDULE, [place] REMOTE TRANSMISSION FF Cardio MMA       Tavon White RN

## 2021-05-21 ENCOUNTER — ANTI-COAG VISIT (OUTPATIENT)
Dept: FAMILY MEDICINE CLINIC | Age: 76
End: 2021-05-21

## 2021-05-21 LAB — INR BLD: 2.2

## 2021-05-24 ENCOUNTER — HOSPITAL ENCOUNTER (OUTPATIENT)
Age: 76
Setting detail: SPECIMEN
Discharge: HOME | End: 2021-05-24
Payer: MEDICARE

## 2021-05-24 LAB
ANION GAP SERPL CALCULATED.3IONS-SCNC: 10 MMOL/L (ref 3–16)
BUN BLDV-MCNC: 29 MG/DL (ref 7–20)
CALCIUM SERPL-MCNC: 8.2 MG/DL (ref 8.3–10.6)
CHLORIDE BLD-SCNC: 100 MMOL/L (ref 99–110)
CO2: 25 MMOL/L (ref 21–32)
CREAT SERPL-MCNC: 1.4 MG/DL (ref 0.6–1.2)
GFR AFRICAN AMERICAN: 44
GFR NON-AFRICAN AMERICAN: 37
GLUCOSE BLD-MCNC: 156 MG/DL (ref 70–99)
POTASSIUM SERPL-SCNC: 3.9 MMOL/L (ref 3.5–5.1)
PRO-BNP: 4032 PG/ML (ref 0–449)
SODIUM BLD-SCNC: 135 MMOL/L (ref 136–145)

## 2021-05-24 PROCEDURE — 36415 COLL VENOUS BLD VENIPUNCTURE: CPT

## 2021-05-24 PROCEDURE — 83880 ASSAY OF NATRIURETIC PEPTIDE: CPT

## 2021-05-24 PROCEDURE — 80048 BASIC METABOLIC PNL TOTAL CA: CPT

## 2021-05-24 RX ORDER — LOSARTAN POTASSIUM 25 MG/1
25 TABLET ORAL DAILY
Qty: 90 TABLET | Refills: 1 | Status: SHIPPED | OUTPATIENT
Start: 2021-05-24 | End: 2021-10-18

## 2021-05-24 RX ORDER — PROMETHAZINE HYDROCHLORIDE 12.5 MG/1
12.5 TABLET ORAL 4 TIMES DAILY PRN
Qty: 20 TABLET | Refills: 0 | Status: SHIPPED | OUTPATIENT
Start: 2021-05-24 | End: 2021-05-31

## 2021-05-24 RX ORDER — METOPROLOL SUCCINATE 50 MG/1
50 TABLET, EXTENDED RELEASE ORAL DAILY
Qty: 90 TABLET | Refills: 1 | Status: SHIPPED | OUTPATIENT
Start: 2021-05-24 | End: 2021-10-18

## 2021-05-24 NOTE — TELEPHONE ENCOUNTER
Medication:   Requested Prescriptions     Pending Prescriptions Disp Refills    metoprolol succinate (TOPROL XL) 50 MG extended release tablet 90 tablet 1     Sig: Take 1 tablet by mouth daily    losartan (COZAAR) 25 MG tablet 90 tablet 1     Sig: Take 1 tablet by mouth daily      Last Filled:      Patient Phone Number: [phone] (home)     Last appt: 5/14/2021   Next appt: 6/14/2021    Last OARRS:   RX Monitoring 3/1/2019   Attestation The Prescription Monitoring Report for this patient was reviewed today.    Periodic Controlled Substance Monitoring -     PDMP Monitoring:    Last PDMP Yisel Guadarrama as Reviewed [facility]):  Review User Review Instant Review Result   Alfonso Nova 12/28/2020 11:26 AM Reviewed PDMP [1]     Preferred Pharmacy:   [facility] , [address] [phone] - F [phone]  [place] [address]  Phone: [phone] Fax: [phone]    [address] [place] [address] [address], [address] [facility] [phone] [facility] [phone] [address]  [place] [address]  Phone: [phone] Fax: [phone]

## 2021-05-24 NOTE — TELEPHONE ENCOUNTER
Patient called stating that she would like to get a 90 day supply of her metoprolol succinate and losartan. Patient has a prescription for 30 days with 3 refills but said they can not get to the store that often.     If she is able to get the 90 day supply [facility]    Please advise

## 2021-05-28 ENCOUNTER — ANTI-COAG VISIT (OUTPATIENT)
Dept: FAMILY MEDICINE CLINIC | Age: 76
End: 2021-05-28

## 2021-05-28 ENCOUNTER — CARE COORDINATION (OUTPATIENT)
Dept: CASE MANAGEMENT | Age: 76
End: 2021-05-28

## 2021-05-28 LAB — INR BLD: 2.2

## 2021-05-28 NOTE — CARE COORDINATION
Iván 45 Transitions Follow Up Call    2021    Patient: Jamie Baird  Patient : 1945   MRN: [id number]  Reason for Admission: HF  Discharge Date: 21 RARS: Readmission Risk Score: 39    Care Transitions Follow Up Call    Needs to be reviewed by the provider   Additional needs identified to be addressed with provider No               Method of communication with provider : none      Care Transition Nurse (CTN) contacted the patient by telephone to follow up after admission on 04/15/2021. Verified name and  with patient as identifiers. CTN provided contact information for future needs. Plan for follow-up call in 5-7 days based on severity of symptoms and risk factors. Care Transitions Subsequent and Final Call    Subsequent and Final Calls  Care Transitions Interventions  Other Interventions:         Andrei Gutierrez states she is doing \"fine\". She states [facility] remains active. Andrei Gutierrez states her weight today = 196 lbs , which is down from 199 lbs yesterday. She denies any SOB or chest pain. Andrei Gutierrez states she always has some edema in her feet, but this is her baseline. She denies any needs at this time. Encouraged Andrei Gutierrez to reach out to her PCP with any non life threatening medical issues or concerns over the Holiday weekend.     Follow Up  Future Appointments   Date Time Provider Tawana Young   2021  8:30 AM Violeta Alonzo Sender, APRN - CNS FF Cardio MMA   2021  8:00 AM Keren Aleman MD [facility]   2021 11:00 AM SCHEDULE, [place] REMOTE TRANSMISSION FF Cardio MMA   2021 12:30 PM [facility] CARDIAC CATH LAB ROOM 3 [facility] CATH [facility]   2021 11:00 AM SCHEDULE, [place] REMOTE TRANSMISSION FF Cardio MMA   2021 11:00 AM SCHEDULE, [place] REMOTE TRANSMISSION FF Cardio MMA   2021 11:00 AM SCHEDULE, [place] REMOTE TRANSMISSION FF Cardio MMA   2021 11:00 AM SCHEDULE, [place] REMOTE TRANSMISSION FF Cardio MMA   2021 11:00 AM SCHEDULE, [place] REMOTE TRANSMISSION FF Cardio FARRUKH Driver, RN

## 2021-06-01 ENCOUNTER — HOSPITAL ENCOUNTER (OUTPATIENT)
Age: 76
Setting detail: SPECIMEN
Discharge: HOME | End: 2021-06-01
Payer: MEDICARE

## 2021-06-01 LAB
ANION GAP SERPL CALCULATED.3IONS-SCNC: 11 MMOL/L (ref 3–16)
BUN BLDV-MCNC: 31 MG/DL (ref 7–20)
CALCIUM SERPL-MCNC: 8.4 MG/DL (ref 8.3–10.6)
CHLORIDE BLD-SCNC: 101 MMOL/L (ref 99–110)
CO2: 25 MMOL/L (ref 21–32)
CREAT SERPL-MCNC: 1.5 MG/DL (ref 0.6–1.2)
GFR AFRICAN AMERICAN: 41
GFR NON-AFRICAN AMERICAN: 34
GLUCOSE BLD-MCNC: 99 MG/DL (ref 70–99)
POTASSIUM SERPL-SCNC: 3.9 MMOL/L (ref 3.5–5.1)
PRO-BNP: 2922 PG/ML (ref 0–449)
SODIUM BLD-SCNC: 137 MMOL/L (ref 136–145)

## 2021-06-01 PROCEDURE — 83880 ASSAY OF NATRIURETIC PEPTIDE: CPT

## 2021-06-01 PROCEDURE — 80048 BASIC METABOLIC PNL TOTAL CA: CPT

## 2021-06-01 PROCEDURE — 36415 COLL VENOUS BLD VENIPUNCTURE: CPT

## 2021-06-03 LAB — INR BLD: 2.6

## 2021-06-04 ENCOUNTER — CARE COORDINATION (OUTPATIENT)
Dept: CASE MANAGEMENT | Age: 76
End: 2021-06-04

## 2021-06-04 ENCOUNTER — TELEPHONE (OUTPATIENT)
Dept: FAMILY MEDICINE CLINIC | Age: 76
End: 2021-06-04

## 2021-06-04 ENCOUNTER — ANTI-COAG VISIT (OUTPATIENT)
Dept: FAMILY MEDICINE CLINIC | Age: 76
End: 2021-06-04

## 2021-06-04 NOTE — TELEPHONE ENCOUNTER
Called pt to advise INR results from self testing was 2.6 on 6/3/21 and per WM is ok, continue same dose, and recheck in 2 weeks

## 2021-06-04 NOTE — CARE COORDINATION
Iván 45 Transitions Follow Up Call    2021    Patient: Maurice Whiting  Patient : 1945   MRN: [id number]  Reason for Admission: CHF   Discharge Date: 21 RARS: Readmission Risk Score: 39         Attempted to reach patient via phone for care transition. VM left stating purpose of call along with my contact information requesting a return call. Care Transitions Subsequent and Final Call    Subsequent and Final Calls  Care Transitions Interventions  Other Interventions:            Follow Up  Future Appointments   Date Time Provider Tawana Young   2021  8:30 AM [facility] [address], [address], APRN - CNS FF Cardio MMA   2021  8:00 AM Isis Alvarez MD [facility] - DYD   2021 11:00 AM SCHEDULE, [place] REMOTE TRANSMISSION FF Cardio MMA   2021 12:30 PM [facility] CARDIAC CATH LAB ROOM 3 [facility] CATH [facility]   2021 11:00 AM SCHEDULE, [place] REMOTE TRANSMISSION FF Cardio MMA   2021 11:00 AM SCHEDULE, [place] REMOTE TRANSMISSION FF Cardio MMA   2021 11:00 AM SCHEDULE, [place] REMOTE TRANSMISSION FF Cardio MMA   2021 11:00 AM SCHEDULE, [place] REMOTE TRANSMISSION FF Cardio MMA   2021 11:00 AM SCHEDULE, [facility] TRANSMISSION FF Cardio MMA       Sangeetha Vizcarra RN

## 2021-06-07 ENCOUNTER — TELEPHONE (OUTPATIENT)
Dept: FAMILY MEDICINE CLINIC | Age: 76
End: 2021-06-07

## 2021-06-07 DIAGNOSIS — M17.0 PRIMARY OSTEOARTHRITIS OF BOTH KNEES: ICD-10-CM

## 2021-06-07 RX ORDER — OXYCODONE HYDROCHLORIDE 5 MG/1
5 TABLET ORAL 3 TIMES DAILY PRN
Qty: 90 TABLET | Refills: 0 | Status: SHIPPED | OUTPATIENT
Start: 2021-06-07 | End: 2021-09-07 | Stop reason: SDUPTHER

## 2021-06-07 NOTE — TELEPHONE ENCOUNTER
PT is requesting refill on OXYCODONE HCL PO 10mg to please called into [address] [place] [address] [address] [facility] [phone]

## 2021-06-09 ENCOUNTER — OFFICE VISIT (OUTPATIENT)
Dept: CARDIOLOGY CLINIC | Age: 76
End: 2021-06-09
Payer: MEDICARE

## 2021-06-09 VITALS
HEIGHT: 68 IN | OXYGEN SATURATION: 95 % | SYSTOLIC BLOOD PRESSURE: 99 MMHG | BODY MASS INDEX: 30.16 KG/M2 | DIASTOLIC BLOOD PRESSURE: 68 MMHG | HEART RATE: 78 BPM | WEIGHT: 199 LBS

## 2021-06-09 DIAGNOSIS — M54.9 OTHER CHRONIC BACK PAIN: ICD-10-CM

## 2021-06-09 DIAGNOSIS — Z95.1 S/P CABG X 3: ICD-10-CM

## 2021-06-09 DIAGNOSIS — G89.29 OTHER CHRONIC BACK PAIN: ICD-10-CM

## 2021-06-09 DIAGNOSIS — I35.0 NONRHEUMATIC AORTIC VALVE STENOSIS: ICD-10-CM

## 2021-06-09 DIAGNOSIS — I48.0 PAF (PAROXYSMAL ATRIAL FIBRILLATION) (HCC): ICD-10-CM

## 2021-06-09 DIAGNOSIS — I50.22 CHRONIC SYSTOLIC HEART FAILURE DUE TO VALVULAR DISEASE (HCC): Primary | ICD-10-CM

## 2021-06-09 DIAGNOSIS — I38 CHRONIC SYSTOLIC HEART FAILURE DUE TO VALVULAR DISEASE (HCC): Primary | ICD-10-CM

## 2021-06-09 PROCEDURE — 99214 OFFICE O/P EST MOD 30 MIN: CPT | Performed by: CLINICAL NURSE SPECIALIST

## 2021-06-09 RX ORDER — MIDODRINE HYDROCHLORIDE 2.5 MG/1
2.5 TABLET ORAL 2 TIMES DAILY
Qty: 60 TABLET | Refills: 0 | Status: SHIPPED | OUTPATIENT
Start: 2021-06-09 | End: 2021-09-16

## 2021-06-09 NOTE — PROGRESS NOTES
[address]  Progress Note    Primary Care Doctor:  Lenny Peters MD    Chief Complaint   Patient presents with    Coronary Artery Disease    Congestive Heart Failure     no cardio symptons        History of Present Illness:  77 y.o. female with history of CAD/CABG in 2018, PAF with maze 2018, HTN, HLD, DVT/PE on coumadin, 2 CVs unsuccessful  -3/10/21 for small bowel pneumatosis with loculated pneumoperitoneum (IV zoysn and TPN), acute on chronic sHF (torsemide decreased at discharge, aldactone was held and coreg dose decreased due to hypotension), TORIBIO on CKD, PAF    I had the pleasure of seeing Mary Trujillo in follow up for sHF. She is in a wheel chair with Amalia Flores (). Her weight is staying around 199 at home. She remains in regular rhythm and is scheduled for ICD . Her BP is mainly above 100 but some days she is in the 90s. Her biggest complaint is her pain in her neck down her back which she takes pain medication. The pain medication helps but takes several hours to work. She has had steroid injections (last in January). She denies any increased shortness of breath, chest pain an no edema. Past Medical History:   has a past medical history of Asthma, Atrial fibrillation ([address] Eosinophilia, Hemoptysis, HIGH CHOLESTEROL, Hx of blood clots, Hypertension, Irregular heart beat, Other specified gastritis without mention of hemorrhage, Palpitations, Skin cancer, and Type II or unspecified type diabetes mellitus without mention of complication, not stated as uncontrolled. Surgical History:   has a past surgical history that includes Cholecystectomy;  section; Colonoscopy (2017); skin biopsy; bronchoscopy (2016); Coronary artery bypass graft (2018); Mitral valve replacement (2018); transesophageal echocardiogram (2018); Tunneled venous catheter placement (Left, 2018); Cardiac catheterization (2018);  Insertable Cardiac Monitor (Left, 08/16/2018); Colonoscopy (01/17/2014); Hysterectomy; Upper gastrointestinal endoscopy (N/A, 10/10/2018); Colonoscopy (10/10/2018); Colonoscopy (N/A, 8/7/2020); Pain management procedure (N/A, 12/18/2020); Pain management procedure (Bilateral, 1/18/2021); and Cardiac catheterization (5/2 and 5/3 2021). Social History:   reports that she has never smoked. She has never used smokeless tobacco. She reports that she does not drink alcohol and does not use drugs. Family History:   Family History   Problem Relation Age of Onset    Cancer Father     Asthma Mother     Hypertension Mother     Heart Disease Mother     High Blood Pressure Mother        Home Medications:  Prior to Admission medications    Medication Sig Start Date End Date Taking? Authorizing Provider   midodrine (PROAMATINE) 2.5 MG tablet Take 1 tablet by mouth 2 times daily 6/9/21  Yes Violeta Salinas APRN - CNS   oxyCODONE (ROXICODONE) 5 MG immediate release tablet Take 1 tablet by mouth 3 times daily as needed for Pain for up to 30 days.  6/7/21 7/7/21 Yes Margy Hinojosa MD   metoprolol succinate (TOPROL XL) 50 MG extended release tablet Take 1 tablet by mouth daily 5/24/21  Yes Margy Hinojosa MD   losartan (COZAAR) 25 MG tablet Take 1 tablet by mouth daily 5/24/21  Yes Margy Hinojosa MD   amiodarone (CORDARONE) 200 MG tablet Take 1 tablet by mouth 2 times daily 5/19/21  Yes Vineet Alvarez MD   spironolactone (ALDACTONE) 25 MG tablet Take 1 tablet by mouth 2 times daily 5/10/21  Yes Nayla Kendrick MD   magnesium oxide (MAG-OX) 400 MG tablet Take 1 tablet by mouth daily 5/10/21  Yes Nayla Kendrick MD   torsemide (DEMADEX) 100 MG tablet Take 0.5 tablets by mouth 2 times daily 5/10/21  Yes Nayla Kendrick MD   potassium chloride (KLOR-CON M) 10 MEQ extended release tablet TAKE 1 TABLET DAILY 4/21/21  Yes Margy Hinojosa MD   montelukast (SINGULAIR) 10 MG tablet TAKE 1 TABLET NIGHTLY 4/21/21  Yes Margy Hinojosa MD warfarin (COUMADIN) 5 MG tablet TAKE 1 TABLET DAILY  Patient taking differently: Managed by PCP 4/21/21  Yes Lenny Peters MD   insulin glargine (LANTUS SOLOSTAR) 100 UNIT/ML injection pen INJECT 26 UNITS IN THE MORNING AND 18 UNITS AT BEDTIME  Patient taking differently: INJECT 26 UNITS IN THE MORNING AND 18 UNITS AT BEDTIME (evening when needed) 3/10/21  Yes Tamia Hong MD   exenatide (BYETTA 10 MCG PEN) 10 MCG/0.04ML injection Inject 0.04 mLs into the skin 2 times daily (with meals) 3/3/21  Yes Lenny Peters MD   ACETAMINOPHEN PO Take 500 mg by mouth every 6 hours as needed    Yes Historical Provider, MD   blood glucose test strips (FREESTYLE LITE) strip USE TO TEST FOUR TIMES A DAY 1/5/21  Yes Lenny Peters MD   albuterol sulfate  (90 Base) MCG/ACT inhaler USE 2 INHALATIONS EVERY 6 HOURS AS NEEDED FOR WHEEZING 11/17/20  Yes Lenny Peters MD   albuterol (PROVENTIL) (2.5 MG/3ML) 0.083% nebulizer solution Take 3 mLs by nebulization every 6 hours as needed for Wheezing 6/2/20  Yes Lenny Peters MD   polyethylene glycol (GLYCOLAX) 17 GM/SCOOP powder Take 17 g by mouth every other day    Yes Historical Provider, MD   omeprazole (PRILOSEC) 20 MG delayed release capsule Take 20 mg by mouth daily   Yes Historical Provider, MD   FreeStyle Lancets MISC 1 each by Does not apply route 4 times daily 4/29/20  Yes Lenny Peters MD   ondansetron (ZOFRAN) 4 MG tablet Take 1 tablet by mouth 3 times daily as needed for Nausea or Vomiting 3/26/20  Yes Lenny Peters MD   Blood Glucose Monitoring Suppl (EMBRACE PRO GLUCOSE METER) GAETANO 1 Device by Does not apply route 4 times daily 2/4/20  Yes Lenny Peters MD   HUMALOG KWIKPEN 100 UNIT/ML SOPN TAKE AS INSTRUCTED BY YOUR PRESCRIBER  Patient taking differently:  Only if high blood sugar-has not been using 12/30/19  Yes Lenny Peters MD   aspirin 81 MG chewable tablet Take 1 tablet by mouth daily 6/7/19  Yes Lenny Peters fluid intake, or urination. No tremor. · Hematologic/Lymphatic: No abnormal bruising or bleeding, blood clots or swollen lymph nodes. · Allergic/Immunologic: No nasal congestion or hives. Physical Examination:    Vitals:    06/09/21 0830   BP: 99/68   Pulse: 78   SpO2: 95%   Weight: 199 lb (90.3 kg)   Height: 5' 8\" (1.727 m)        Constitutional and General Appearance: Warm and dry, no apparent distress, normal coloration  HEENT:  Normocephalic, atraumatic  Respiratory:  · Normal excursion and expansion without use of accessory muscles  · Resp Auscultation: Normal breath sounds without dullness  Cardiovascular:  · The apical impulses not displaced  · Heart tones are crisp and normal  · JVP normal cm H2O  · regular rate and rhythm  · Peripheral pulses are symmetrical and full  · There is no clubbing, cyanosis of the extremities.   · No ankle edema  · Pedal Pulses: 2+ and equal   Abdomen: distended + bowel sounds  · No masses or tenderness  · Liver/Spleen: No Abnormalities Noted  Neurological/Psychiatric:  · Alert and oriented in all spheres  · Moves all extremities well  · Exhibits normal gait balance and coordination  · No abnormalities of mood, affect, memory, mentation, or behavior are noted    Lab Data:    CBC:   Lab Results   Component Value Date    WBC 4.7 05/07/2021    WBC 6.7 05/05/2021    WBC 6.0 05/01/2021    RBC 3.69 05/07/2021    RBC 3.70 05/05/2021    RBC 3.94 05/01/2021    HGB 10.4 05/10/2021    HGB 10.1 05/07/2021    HGB 10.2 05/05/2021    HCT 32.9 05/10/2021    HCT 31.1 05/07/2021    HCT 31.2 05/05/2021    MCV 84.3 05/07/2021    MCV 84.5 05/05/2021    MCV 82.7 05/01/2021    RDW 17.5 05/07/2021    RDW 17.3 05/05/2021    RDW 16.9 05/01/2021     05/07/2021     05/05/2021     05/01/2021     BMP:  Lab Results   Component Value Date     06/01/2021     05/24/2021     05/20/2021    K 3.9 06/01/2021    K 3.9 05/24/2021    K 4.2 05/20/2021    K 3.5 04/21/2021    K 3.7 04/20/2021    K 4.0 04/19/2021     06/01/2021     05/24/2021    CL 97 05/20/2021    CO2 25 06/01/2021    CO2 25 05/24/2021    CO2 27 05/20/2021    PHOS 4.0 05/05/2021    PHOS 3.7 05/04/2021    PHOS 3.6 05/03/2021    BUN 31 06/01/2021    BUN 29 05/24/2021    BUN 27 05/20/2021    CREATININE 1.5 06/01/2021    CREATININE 1.4 05/24/2021    CREATININE 1.6 05/20/2021     BNP:   Lab Results   Component Value Date    PROBNP 2,922 06/01/2021    PROBNP 4,032 05/24/2021    PROBNP 3,180 05/10/2021     Cardiac Imaging:  [facility] Preliminary 5/4/2021 Dr Melania Pagan  AV - area ~ 1.5, opens adequately, not severe aortic stenosis     [facility] 5/3/2021 Dr Melania Pagan  Artery Findings/Result   LM Normal   LAD 50% mid, 70% mid, competitive flow distal from LIMA   Cx OM1 20% ostial to prox, superior branch mid OM1 50% ostial   RI N/A   S-D-RPL patent   L-LAD patent   RCA 50-60% distal, very heavily calcified   LVEDP 15   AV Peak to peak gradient 36mmHg, valve crossed easily with pigtail. LVG NA      Intervention:         None     Post Cath Dx:       Patent grafts     Echo 4/20/21  Summary   Overall left ventricular systolic function is severely depressed .   Ejection fraction is visually estimated to be 10-15 %.  E/e'= 23.2 GLS=-3.4   Moderately dilated left ventricle.   There is severe diffuse hypokinesis.   Indeterminate diastolic function.   A bioprosthetic mitral valve is well seated with peak velocity of 1.59m/s   and a mean gradient of 3mmHg.   The left atrium is moderately dilated.   Mild aortic stenosis with a peak velocity of 2.5m/s and a mean pressure   gradient of 14mmHg.   The aortic valve is thickened/calcified with decreased leaflet mobility   consistent with aortic stenosis.   Mild to moderate tricuspid regurgitation.   Estimated pulmonary artery systolic pressure is mildly to moderately   elevated at 46 mmHg assuming a right atrial pressure of 8 mmHg    11/30/2020 Dobutamine Echo   Dobutamine echocardiogram for aortic stenosis.   Very technically limited exam due to atrial fibrillation.   Baseline echocardiogram shows severe left ventricular dysfunction with   anterior, lateral and apical hypokinesis with an ejection fraction of 20-25%.   There is no improvement in wall motion with low or high dose dobutamine   suggestive of nonviable myocardium.      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 After dobutamine stress the mean AV   gradient rises to 20mmHg with 20mcg of dobutamine consistent with mild to   moderate aortic stenosis. Prosthetic mitral valve with a mean gradient of 7mmHg        [facility] 10/21/2020  Mildly dilated left ventricular size and normal wall thickness. Global left ventricular function is severely decreased with ejection fraction estimated at 25%. Patient is in atrial fibrillation during procedure.      The bioprosthetic mitral valve is well seated with a mean gradient of 5mmHg and maximum pressure gradient of 15 mmHg with heart rate of 89 bpm. Pressure half time of 82 ms. There is trivial mitral regurgitation.      Left atrial enlargement. Spontaneous echo contrast seen in the left atrium. A large stump of the left atrial appendage with no thrombus noted. Patient has history of surgical ligation of the left atrial appendage. There are spontaneous echo contrast in the atrial appendage.      The aortic valve is thickened/calcified with decreased leaflet mobility consistent with aortic stenosis.  There is trivial aortic insufficiency.      There is moderate tricuspid regurgitation.     ECHO 9/15/20  Left ventricular cavity size is dilated. There is normal wall thickness with a moderately severe reduction in   systolic function.   LV ejection fraction is visually estimated to be 25-30%.   Indeterminate diastolic function.   The right ventricle is not well visualized but appears to be normal in size with moderately reduced function.   The left atrium is moderately dilated.   A bioprosthetic mitral valve with a mean gradient of 7 mmHg. This may be a normal gradient for this valve but could suggest mild stenosis.   Trivial mitral regurgitation.   The aortic valve is thickened/calcified with a mean gradient of 16mmHg consistent with at least mild aortic stenosis. This is likely underestimated due to low cardiac output secondary to LV dysfunction.   No significant aortic valve regurgitation.   Moderate tricuspid regurgitation with RVSP of 48 mmHg.     [facility] 11/1/2019  Normal left ventricular cavity size and wall thickness. Global left ventricular function is moderate-to-severely decreased with ejection fraction estimated from 35% to 40%. Severe apical akinesis noted.      The bioprosthetic mitral valve is well seated with a mean gradient of 2mmHg and maximum pressure gradient of 5 mmHg. There is trivial mitral regurgitation.      Left atrial enlargement. Spontaneous echo contrast seen in the left atrium.   Stump of the left atrial appendage noted with no thrombus.      The aortic valve is thickened/calcified with decreased leaflet mobility consistent with aortic stenosis. There is trivial aortic insufficiency    Assessment:    1. Chronic systolic heart failure due to valvular disease ([facility]) on arb and aldosterone antagonist   2. PAF (paroxysmal atrial fibrillation) (HCC) on coumadin in nsr   3. S/P CABG x 3    4. Nonrheumatic aortic valve stenosis    5. Other chronic back pain          Plan:   Patient Instructions   1. Start midodrine 5 mg when blood pressure <100  2. Refer to Dr Favian Curiel for cervical pain  3. Continue all other medications  4.   RTO in 2 months    She is scheduled for ICD placement June 30 th    I appreciate the opportunity of cooperating in the care of this individual.    SOLEDAD Couch - CNS, CNS, 6/9/2021, 9:14 AM

## 2021-06-09 NOTE — PATIENT INSTRUCTIONS
1.  Start midodrine 5 mg when blood pressure <100  2. Refer to Dr Ata Lane for cervical pain  3. Continue all other medications  4.   RTO in 2 months

## 2021-06-14 ENCOUNTER — NURSE ONLY (OUTPATIENT)
Dept: CARDIOLOGY CLINIC | Age: 76
End: 2021-06-14

## 2021-06-14 ENCOUNTER — TELEPHONE (OUTPATIENT)
Dept: CARDIOLOGY CLINIC | Age: 76
End: 2021-06-14

## 2021-06-14 ENCOUNTER — OFFICE VISIT (OUTPATIENT)
Dept: FAMILY MEDICINE CLINIC | Age: 76
End: 2021-06-14
Payer: MEDICARE

## 2021-06-14 VITALS
TEMPERATURE: 97.5 F | DIASTOLIC BLOOD PRESSURE: 60 MMHG | SYSTOLIC BLOOD PRESSURE: 100 MMHG | BODY MASS INDEX: 30.74 KG/M2 | HEART RATE: 90 BPM | WEIGHT: 202.2 LBS | OXYGEN SATURATION: 96 %

## 2021-06-14 DIAGNOSIS — N18.31 STAGE 3A CHRONIC KIDNEY DISEASE (HCC): ICD-10-CM

## 2021-06-14 DIAGNOSIS — K63.89 PNEUMATOSIS INTESTINALIS OF SMALL INTESTINE: ICD-10-CM

## 2021-06-14 DIAGNOSIS — K52.81 EOSINOPHILIC GASTRITIS: ICD-10-CM

## 2021-06-14 DIAGNOSIS — I48.0 PAF (PAROXYSMAL ATRIAL FIBRILLATION) (HCC): ICD-10-CM

## 2021-06-14 DIAGNOSIS — Z45.09 ENCOUNTER FOR ELECTRONIC ANALYSIS OF REVEAL EVENT RECORDER: ICD-10-CM

## 2021-06-14 DIAGNOSIS — E11.69 DIABETES MELLITUS TYPE 2 IN OBESE (HCC): Primary | ICD-10-CM

## 2021-06-14 DIAGNOSIS — B35.4 TINEA CORPORIS: ICD-10-CM

## 2021-06-14 DIAGNOSIS — I25.118 CORONARY ARTERY DISEASE OF NATIVE ARTERY OF NATIVE HEART WITH STABLE ANGINA PECTORIS (HCC): ICD-10-CM

## 2021-06-14 DIAGNOSIS — E66.9 DIABETES MELLITUS TYPE 2 IN OBESE (HCC): Primary | ICD-10-CM

## 2021-06-14 PROBLEM — E16.2 HYPOGLYCEMIA: Status: RESOLVED | Noted: 2021-04-26 | Resolved: 2021-06-14

## 2021-06-14 PROBLEM — K66.8 PNEUMOPERITONEUM: Status: RESOLVED | Noted: 2018-07-28 | Resolved: 2021-06-14

## 2021-06-14 PROBLEM — K55.1 ATHEROSCLEROSIS OF SUPERIOR MESENTERIC ARTERY (HCC): Status: ACTIVE | Noted: 2021-06-14

## 2021-06-14 PROCEDURE — 99214 OFFICE O/P EST MOD 30 MIN: CPT | Performed by: FAMILY MEDICINE

## 2021-06-14 PROCEDURE — 93298 REM INTERROG DEV EVAL SCRMS: CPT | Performed by: INTERNAL MEDICINE

## 2021-06-14 RX ORDER — PEN NEEDLE, DIABETIC 32GX 5/32"
NEEDLE, DISPOSABLE MISCELLANEOUS
Qty: 360 EACH | Refills: 3 | Status: SHIPPED | OUTPATIENT
Start: 2021-06-14

## 2021-06-14 NOTE — PROGRESS NOTES
We received a remote transmission from patient's monitor at home. Remote Linq report shows known AF. Pt is on coumadin. EP physician to review. We will continue to monitor remotely.

## 2021-06-14 NOTE — PROGRESS NOTES
Subjective:      Patient ID: Dillon Ibrahim is a 77 y.o. female. CC: Patient presents for re-evaluation of chronic health problems including diabetes mellitus, coronary disease, plans for cardiac defibrillator, chronic kidney disease, diarrhea, eosinophilic gastritis, pneumatosis of the intestine, and skin rash. [facility] Patient presents today for a follow-up on chronic medications and medical conditions in accompaniment of . Patient is getting a defibrillator put in at the end of this month by Dr. Marty Abdul. She complained a lot of gaseousness and bloating this. She is having some diarrhea although its intermittent nature. She is also having some paresthesias specially of her little fingers bilaterally. She knows most the symptoms are all related to amiodarone. The plan is to come off amiodarone after she has a defibrillator placed. Blood sugars have done extremely well at home typically ranging 100-140. She occasionally has some high blood sugars occasionally some low blood sugars. She has a rash along her sacrum area that she is having difficulty healing. She denies any chest pain. She denies any fast heartbeat problems. She has not had to take any of the Zaroxolyn medication or midodrine. Eye exam current (within one year): Yes    Checks sugars at home: yes  Home blood sugar records: patient tests 4 time(s) per day  Any episodes of hypoglycemia?  No    Current medication use: taking as prescribed  Medication side effects: none     Current diet: she states she feels nauseous around dinner and she can't eat much dinner; eats breakfast and lunch she forces most of the time  Current exercise:not active    Review of Systems     Patient Active Problem List   Diagnosis    Long term current use of anticoagulant    Hypercholesteremia    Generalized osteoarthrosis, involving multiple sites    Eosinophilic gastritis    [facility]ox[facility]ra[facility]ricular [facility]cardia) [facility] [address]    B12 deficiency    History of pulmonary embolism    Multiple pulmonary nodules    Type 2 diabetes mellitus with hyperglycemia, with long-term current use of insulin ([facility])    Asthma    Pneumoperitoneum    PAF (paroxysmal atrial fibrillation) ([facility])    Hypertension    Encounter for loop recorder check    Coronary artery disease due to lipid rich plaque    Nonrheumatic mitral valve regurgitation    S/P CABG x 3    Goiter    Primary osteoarthritis of both knees    Splenic infarct    Obesity, Class I, BMI 30-34.9    Chronic systolic CHF (congestive heart failure), NYHA class 3 ([facility])    Thoracic degenerative disc disease    Persistent atrial fibrillation ([facility])    S/P MVR (mitral valve repair)    Ischemic cardiomyopathy    Occlusion and stenosis of bilateral carotid arteries    Pneumatosis intestinalis of small intestine    Aortic valve stenosis    Deep vein thrombosis (DVT) of non-extremity vein    Acute on chronic systolic heart failure due to valvular disease ([facility])    Stage 3 chronic kidney disease ([facility])    Hypotension    Atrial fibrillation with rapid ventricular response ([facility])    Chest pain    Dyspnea on exertion    Acute kidney injury superimposed on CKD ([facility])    Acute on chronic systolic CHF (congestive heart failure) ([facility])    Hypoglycemia    Chronic diastolic heart failure ([facility])    Hypokalemia       Outpatient Medications Marked as Taking for the 6/14/21 encounter (Office Visit) with Alla Lewis MD   Medication Sig Dispense Refill    midodrine (PROAMATINE) 2.5 MG tablet Take 1 tablet by mouth 2 times daily 60 tablet 0    oxyCODONE (ROXICODONE) 5 MG immediate release tablet Take 1 tablet by mouth 3 times daily as needed for Pain for up to 30 days.  90 tablet 0    metoprolol succinate (TOPROL XL) 50 MG extended release tablet Take 1 tablet by mouth daily 90 tablet 1    losartan (COZAAR) 25 MG tablet Take 1 tablet by mouth daily 90 tablet 1    amiodarone (CORDARONE) 200 MG tablet Take 1 tablet by mouth 2 times daily 60 tablet 2    spironolactone (ALDACTONE) 25 MG tablet Take 1 tablet by mouth 2 times daily 60 tablet 3    magnesium oxide (MAG-OX) 400 MG tablet Take 1 tablet by mouth daily 90 tablet 3    torsemide (DEMADEX) 100 MG tablet Take 0.5 tablets by mouth 2 times daily 45 tablet 3    potassium chloride (KLOR-CON M) 10 MEQ extended release tablet TAKE 1 TABLET DAILY 90 tablet 0    montelukast (SINGULAIR) 10 MG tablet TAKE 1 TABLET NIGHTLY 90 tablet 0    warfarin (COUMADIN) 5 MG tablet TAKE 1 TABLET DAILY (Patient taking differently: Managed by PCP) 100 tablet 0    insulin glargine (LANTUS SOLOSTAR) 100 UNIT/ML injection pen INJECT 26 UNITS IN THE MORNING AND 18 UNITS AT BEDTIME (Patient taking differently: INJECT 26 UNITS IN THE MORNING AND 18 UNITS AT BEDTIME (evening when needed)) 90 mL 1    exenatide (BYETTA 10 MCG PEN) 10 MCG/0.04ML injection Inject 0.04 mLs into the skin 2 times daily (with meals) 3 pen 1    ACETAMINOPHEN PO Take 500 mg by mouth every 6 hours as needed       blood glucose test strips (FREESTYLE LITE) strip USE TO TEST FOUR TIMES A  strip 4    albuterol sulfate  (90 Base) MCG/ACT inhaler USE 2 INHALATIONS EVERY 6 HOURS AS NEEDED FOR WHEEZING 25.5 g 2    albuterol (PROVENTIL) (2.5 MG/3ML) 0.083% nebulizer solution Take 3 mLs by nebulization every 6 hours as needed for Wheezing 120 each 3    polyethylene glycol (GLYCOLAX) 17 GM/SCOOP powder Take 17 g by mouth every other day       omeprazole (PRILOSEC) 20 MG delayed release capsule Take 20 mg by mouth daily      FreeStyle Lancets MISC 1 each by Does not apply route 4 times daily 200 each 5    ondansetron (ZOFRAN) 4 MG tablet Take 1 tablet by mouth 3 times daily as needed for Nausea or Vomiting 30 tablet 0    Blood Glucose Monitoring Suppl ([employer] PRO GLUCOSE METER) GAETANO 1 Device by Does not apply route 4 times daily 1 Device 0    HUMALOG KWIKPEN 100 UNIT/ML SOPN TAKE AS INSTRUCTED BY YOUR PRESCRIBER (Patient taking differently: Only if high blood sugar-has not been using) 15 mL 8    nystatin (MYCOSTATIN) 760571 UNIT/ML suspension TAKE ONE TEASPOONFUL BY MOUTH FOUR TIMES A DAY FOR 5 DAYS 1 Bottle 2    aspirin 81 MG chewable tablet Take 1 tablet by mouth daily 30 tablet 3    BD PEN NEEDLE JANNET U/F 32G X 4 MM MISC USE 1 PEN NEEDLE FOUR TIMES A  each 3    vitamin B-12 500 MCG tablet Take 1 tablet by mouth daily 30 tablet 3       Allergies   Allergen Reactions    Foetvayg-Xtapmqt-Dyboxd [Fluocinolone] Shortness Of Breath    Ciprofloxacin Shortness Of Breath    Diovan [Valsartan] Shortness Of Breath    Flagyl [Metronidazole] Shortness Of Breath     Has taken diflucan at home 12/7/15    Metformin And Related [Metformin And Related] Shortness Of Breath    Benazepril      Other reaction(s): Not Recorded    Morphine      Bad reaction. \"makes her feel horrible\".  Saxagliptin      Other reaction(s): Not Recorded    Levaquin [Levofloxacin] Rash       Social History     Tobacco Use    Smoking status: Never Smoker    Smokeless tobacco: Never Used   Substance Use Topics    Alcohol use: No       /60 (Site: Left Upper Arm, Position: Sitting, Cuff Size: Large Adult)   Pulse 90   Temp 97.5 °F (36.4 °C) (Infrared)   Wt 202 lb 3.2 oz (91.7 kg)   SpO2 96%   BMI 30.74 kg/m²       Objective:   Physical Exam  Constitutional:       General: She is not in acute distress. Appearance: She is well-developed. Neck:      Vascular: No carotid bruit. Cardiovascular:      Rate and Rhythm: Normal rate and regular rhythm. Pulses:           Dorsalis pedis pulses are 2+ on the right side and 2+ on the left side. Posterior tibial pulses are 2+ on the right side and 2+ on the left side. Heart sounds: Murmur (Right sternal border) heard. Systolic murmur is present with a grade of 2/6. No friction rub. No gallop.     Pulmonary:      Effort: Pulmonary effort is normal.      Breath sounds: Normal breath sounds. Abdominal:      General: Bowel sounds are increased. There is distension. Palpations: Abdomen is soft. Tenderness: There is abdominal tenderness in the epigastric area. There is no right CVA tenderness or left CVA tenderness. Musculoskeletal:         General: No tenderness. Normal range of motion. Right lower leg: No edema. Left lower leg: No edema. Skin:     Findings: Rash present. Comments: Shiny erythema noted of the crease of the upper buttocks area. Neurological:      Mental Status: She is alert and oriented to person, place, and time. Sensory: Sensation is intact. Motor: Motor function is intact. Psychiatric:         Behavior: Behavior is cooperative. Assessment:      Alberta Kelly was seen today for 1 month follow-up. Diagnoses and all orders for this visit:    Diabetes mellitus type 2 in obese (HCC)  -     Insulin Pen Needle (BD PEN NEEDLE JANNET U/F) 32G X 4 MM MISC; USE 1 PEN NEEDLE FOUR TIMES A DAY  -     Comprehensive Metabolic Panel; Future  -     Hemoglobin A1C; Future    Coronary artery disease of native artery of native heart with stable angina pectoris (HCC)    Eosinophilic gastritis  -     CBC WITH AUTO DIFFERENTIAL; Future    Stage 3a chronic kidney disease (HCC)    Pneumatosis intestinalis of small intestine    Tinea corporis    Other orders  -     ciclopirox (LOPROX) 0.77 % cream; Apply topically 2 times daily. Plan:      Laboratory profile reviewed with patient and . Maintain current diabetic management. The abdominal issues are probably related to the amiodarone medication. Since she is going to be coming off the amiodarone at the end of the month we will not add or changing at this time. She apparently did have P93 and folic acid levels performed with home visiting nurse but lab reports are not available to me at this time. Begin antifungal cream to the buttocks rash area.     Abdominal symptoms are not consistent with her eosinophilic gastritis problems in the past but this needs to be reassessed in the next lab test.    RTC 2 months    Medical decision making of moderate complexity. Please note that this chart was generated using Dragon dictation software. Although every effort was made to ensure the accuracy of this automated transcription, some errors in transcription may have occurred.

## 2021-06-14 NOTE — TELEPHONE ENCOUNTER
Pt  calling wants to talk to [facility]. Pt is zechariah for a procedure on 06/30 with RMM and was told he is out on medical Leave and that one of his counterparts would be doing the procedure.  Pt has never met this Dr and is not comfortable with this without talking to [facility] whom she trust. Pls call to advise Thank you

## 2021-06-17 LAB — INR BLD: 2.6

## 2021-06-18 ENCOUNTER — ANTI-COAG VISIT (OUTPATIENT)
Dept: FAMILY MEDICINE CLINIC | Age: 76
End: 2021-06-18

## 2021-06-19 PROCEDURE — 93298 REM INTERROG DEV EVAL SCRMS: CPT | Performed by: INTERNAL MEDICINE

## 2021-06-19 PROCEDURE — G2066 INTER DEVC REMOTE 30D: HCPCS | Performed by: INTERNAL MEDICINE

## 2021-07-01 ENCOUNTER — ANTI-COAG VISIT (OUTPATIENT)
Dept: FAMILY MEDICINE CLINIC | Age: 76
End: 2021-07-01

## 2021-07-01 LAB — INR BLD: 3.1

## 2021-07-07 ENCOUNTER — APPOINTMENT (OUTPATIENT)
Dept: GENERAL RADIOLOGY | Age: 76
End: 2021-07-07
Attending: INTERNAL MEDICINE
Payer: MEDICARE

## 2021-07-07 ENCOUNTER — HOSPITAL ENCOUNTER (OUTPATIENT)
Dept: CARDIAC CATH/INVASIVE PROCEDURES | Age: 76
Discharge: HOME | End: 2021-07-07
Attending: INTERNAL MEDICINE | Admitting: INTERNAL MEDICINE
Payer: MEDICARE

## 2021-07-07 VITALS
DIASTOLIC BLOOD PRESSURE: 68 MMHG | TEMPERATURE: 97 F | HEART RATE: 72 BPM | SYSTOLIC BLOOD PRESSURE: 119 MMHG | HEIGHT: 68 IN | BODY MASS INDEX: 29.7 KG/M2 | WEIGHT: 196 LBS | OXYGEN SATURATION: 96 %

## 2021-07-07 DIAGNOSIS — I47.1 PAROXYSMAL SVT (SUPRAVENTRICULAR TACHYCARDIA) (HCC): ICD-10-CM

## 2021-07-07 DIAGNOSIS — Z95.810 ICD (IMPLANTABLE CARDIOVERTER-DEFIBRILLATOR), DUAL, IN SITU: Primary | ICD-10-CM

## 2021-07-07 DIAGNOSIS — I48.0 PAF (PAROXYSMAL ATRIAL FIBRILLATION) (HCC): ICD-10-CM

## 2021-07-07 DIAGNOSIS — I25.5 ISCHEMIC CARDIOMYOPATHY: ICD-10-CM

## 2021-07-07 DIAGNOSIS — I50.22 CHRONIC SYSTOLIC CHF (CONGESTIVE HEART FAILURE), NYHA CLASS 3 (HCC): ICD-10-CM

## 2021-07-07 LAB
ANION GAP SERPL CALCULATED.3IONS-SCNC: 9 MMOL/L (ref 3–16)
BUN BLDV-MCNC: 29 MG/DL (ref 7–20)
CALCIUM SERPL-MCNC: 8.7 MG/DL (ref 8.3–10.6)
CHLORIDE BLD-SCNC: 101 MMOL/L (ref 99–110)
CO2: 27 MMOL/L (ref 21–32)
CREAT SERPL-MCNC: 1.6 MG/DL (ref 0.6–1.2)
EKG ATRIAL RATE: 72 BPM
EKG DIAGNOSIS: NORMAL
EKG P-R INTERVAL: 264 MS
EKG Q-T INTERVAL: 448 MS
EKG QRS DURATION: 98 MS
EKG QTC CALCULATION (BAZETT): 490 MS
EKG R AXIS: 37 DEGREES
EKG T AXIS: 89 DEGREES
EKG VENTRICULAR RATE: 72 BPM
GFR AFRICAN AMERICAN: 38
GFR NON-AFRICAN AMERICAN: 31
GLUCOSE BLD-MCNC: 105 MG/DL (ref 70–99)
HCT VFR BLD CALC: 35.3 % (ref 36–48)
HEMOGLOBIN: 11.6 G/DL (ref 12–16)
INR BLD: 2.4 (ref 0.86–1.14)
MAGNESIUM: 2.3 MG/DL (ref 1.8–2.4)
MCH RBC QN AUTO: 28.3 PG (ref 26–34)
MCHC RBC AUTO-ENTMCNC: 32.7 G/DL (ref 31–36)
MCV RBC AUTO: 86.6 FL (ref 80–100)
PDW BLD-RTO: 18.9 % (ref 12.4–15.4)
PLATELET # BLD: 283 K/UL (ref 135–450)
PMV BLD AUTO: 7.4 FL (ref 5–10.5)
POTASSIUM SERPL-SCNC: 4.3 MMOL/L (ref 3.5–5.1)
RBC # BLD: 4.08 M/UL (ref 4–5.2)
SODIUM BLD-SCNC: 137 MMOL/L (ref 136–145)
WBC # BLD: 6.4 K/UL (ref 4–11)

## 2021-07-07 PROCEDURE — C1892 INTRO/SHEATH,FIXED,PEEL-AWAY: HCPCS

## 2021-07-07 PROCEDURE — C1894 INTRO/SHEATH, NON-LASER: HCPCS

## 2021-07-07 PROCEDURE — 93010 ELECTROCARDIOGRAM REPORT: CPT | Performed by: INTERNAL MEDICINE

## 2021-07-07 PROCEDURE — C1721 AICD, DUAL CHAMBER: HCPCS

## 2021-07-07 PROCEDURE — 2780000010 HC IMPLANT OTHER

## 2021-07-07 PROCEDURE — 33286 RMVL SUBQ CAR RHYTHM MNTR: CPT

## 2021-07-07 PROCEDURE — 85027 COMPLETE CBC AUTOMATED: CPT

## 2021-07-07 PROCEDURE — 33249 INSJ/RPLCMT DEFIB W/LEAD(S): CPT | Performed by: INTERNAL MEDICINE

## 2021-07-07 PROCEDURE — C1777 LEAD, AICD, ENDO SINGLE COIL: HCPCS

## 2021-07-07 PROCEDURE — 80048 BASIC METABOLIC PNL TOTAL CA: CPT

## 2021-07-07 PROCEDURE — 71045 X-RAY EXAM CHEST 1 VIEW: CPT

## 2021-07-07 PROCEDURE — 99152 MOD SED SAME PHYS/QHP 5/>YRS: CPT | Performed by: INTERNAL MEDICINE

## 2021-07-07 PROCEDURE — 36415 COLL VENOUS BLD VENIPUNCTURE: CPT

## 2021-07-07 PROCEDURE — 83735 ASSAY OF MAGNESIUM: CPT

## 2021-07-07 PROCEDURE — 2580000003 HC RX 258

## 2021-07-07 PROCEDURE — 85610 PROTHROMBIN TIME: CPT

## 2021-07-07 PROCEDURE — 6360000002 HC RX W HCPCS

## 2021-07-07 PROCEDURE — 2500000003 HC RX 250 WO HCPCS

## 2021-07-07 PROCEDURE — 99153 MOD SED SAME PHYS/QHP EA: CPT

## 2021-07-07 PROCEDURE — 99152 MOD SED SAME PHYS/QHP 5/>YRS: CPT

## 2021-07-07 PROCEDURE — 33286 RMVL SUBQ CAR RHYTHM MNTR: CPT | Performed by: INTERNAL MEDICINE

## 2021-07-07 PROCEDURE — 33249 INSJ/RPLCMT DEFIB W/LEAD(S): CPT

## 2021-07-07 PROCEDURE — C1898 LEAD, PMKR, OTHER THAN TRANS: HCPCS

## 2021-07-07 PROCEDURE — 93005 ELECTROCARDIOGRAM TRACING: CPT | Performed by: INTERNAL MEDICINE

## 2021-07-07 PROCEDURE — 76937 US GUIDE VASCULAR ACCESS: CPT | Performed by: INTERNAL MEDICINE

## 2021-07-07 RX ORDER — OXYCODONE HYDROCHLORIDE 5 MG/1
10 TABLET ORAL EVERY 4 HOURS PRN
Status: CANCELLED | OUTPATIENT
Start: 2021-07-07

## 2021-07-07 RX ORDER — SODIUM CHLORIDE 0.9 % (FLUSH) 0.9 %
5-40 SYRINGE (ML) INJECTION EVERY 12 HOURS SCHEDULED
Status: CANCELLED | OUTPATIENT
Start: 2021-07-07

## 2021-07-07 RX ORDER — AMIODARONE HYDROCHLORIDE 200 MG/1
200 TABLET ORAL DAILY
Qty: 60 TABLET | Refills: 2 | Status: SHIPPED | OUTPATIENT
Start: 2021-07-07 | End: 2021-11-02

## 2021-07-07 RX ORDER — SODIUM CHLORIDE 9 MG/ML
25 INJECTION, SOLUTION INTRAVENOUS PRN
Status: CANCELLED | OUTPATIENT
Start: 2021-07-07

## 2021-07-07 RX ORDER — ONDANSETRON 2 MG/ML
4 INJECTION INTRAMUSCULAR; INTRAVENOUS EVERY 6 HOURS PRN
Status: CANCELLED | OUTPATIENT
Start: 2021-07-07

## 2021-07-07 RX ORDER — OXYCODONE HYDROCHLORIDE 5 MG/1
5 TABLET ORAL EVERY 4 HOURS PRN
Status: CANCELLED | OUTPATIENT
Start: 2021-07-07

## 2021-07-07 RX ORDER — SODIUM CHLORIDE 0.9 % (FLUSH) 0.9 %
5-40 SYRINGE (ML) INJECTION PRN
Status: CANCELLED | OUTPATIENT
Start: 2021-07-07

## 2021-07-07 NOTE — H&P
CC: ICM  HPI: Belkis Dsouza is a 77 y.o. female  female past medical history significant for CAD/CABG in 2018, PAF with WAYNE clip on 8/18 and maze procedure in 2018, ablation of atrial fibrillation, status post ILR, hypertension, hyperlipidemia, DVT/PE on Coumadin and systolic heart failure with severe LV dysfunction.     She was admitted with palpitation tachycardia, found to be in atrial fibrillation.  Treated initially with medication. Cardioversion was tried which was unsuccessful. Ameya Liz has been treated with amiodarone.     She has history of severe LV dysfunction.  Her ejection fraction on 11/30/2020 was 20%.  She has been treated with medical therapy.  Repeat echocardiography with ejection fraction of 10 to 15%.     Admitted to [facility] 4/15/2021.  She was treated with IV milrinone on admission and Lasix drip.  She has diuresed well.  IV milrinone has been stopped and Lasix drips was also stopped.     5/3/2021 underwent LHC which showed patent grafts      She has moderate aortic stenosis and underwent JUDE by interventional cardiology to assess the valve and does not appear to have severe aortic valve stenosis at this time therefore no intervention was recommended.     She was loaded with amiodarone and then cardioversion was initially attempted which failed.       On 5/4/2021 after JUDE cardioversion was done which was successful.                   Patient Active Problem List     Diagnosis Date Noted    Goiter 09/07/2018    S/P CABG x 3 08/22/2018    Coronary artery disease due to lipid rich plaque      Nonrheumatic mitral valve regurgitation      Encounter for loop recorder check 08/16/2018    Pneumoperitoneum 07/28/2018    PAF (paroxysmal atrial fibrillation) ([facility] [address]      Hypertension      Asthma 01/12/2018    Type 2 diabetes mellitus with hyperglycemia, with long-term current use of insulin ([address] 04/27/2017    Primary osteoarthritis of both knees 03/21/2017    Multiple pulmonary nodules 08/01/2016    History of pulmonary embolism      B12 deficiency 09/08/2014    Paroxysmal SVT (supraventricular tachycardia) (HCC) 34/25/0357    Eosinophilic gastritis 37/62/3527    Generalized osteoarthrosis, involving multiple sites 03/25/2013    Long term current use of anticoagulant 12/19/2012    Hypercholesteremia 12/19/2012    Chronic diastolic heart failure ([facility])      Hypokalemia      Acute on chronic systolic CHF (congestive heart failure) ([facility] [address] 04/26/2021    Hypoglycemia 04/26/2021    Atrial fibrillation with rapid ventricular response ([facility])      Chest pain      Dyspnea on exertion      Acute kidney injury superimposed on CKD (HCC)      Hypotension      Acute on chronic systolic heart failure due to valvular disease ([address] 02/26/2021    Stage 3 chronic kidney disease      Deep vein thrombosis (DVT) of non-extremity vein 12/15/2020    Aortic valve stenosis 11/03/2020    Pneumatosis intestinalis of small intestine 05/10/2020    Ischemic cardiomyopathy 01/20/2020    Occlusion and stenosis of bilateral carotid arteries 01/20/2020    Persistent atrial fibrillation ([address] 10/30/2019    S/P MVR (mitral valve repair)      Thoracic degenerative disc disease 06/07/2019    Chronic systolic CHF (congestive heart failure), NYHA class 3 ([address] 01/15/2019    Obesity, Class I, BMI 30-34. 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   Ejection fraction is visually estimated to be 10-15 %.  E/e'= 23.2 GLS=-3.4   Moderately dilated left ventricle.   There is severe diffuse hypokinesis.   Indeterminate diastolic function.   A bioprosthetic mitral valve is well seated with peak velocity of 1.59m/s   and a mean gradient of 3mmHg.   The left atrium is moderately dilated.   Mild aortic stenosis with a peak velocity of 2.5m/s and a mean pressure   gradient of 14mmHg.   The aortic valve is thickened/calcified with decreased leaflet mobility   consistent with aortic stenosis.   Mild to moderate tricuspid regurgitation.   Estimated pulmonary artery systolic pressure is mildly to moderately   elevated at 46 mmHg assuming a right atrial pressure of 8 mmHg.        Stress test 8/7/2018       Summary    Small sized lateral completely reversible defect of mild intensity    consistent with ischemia in the territory of the LCx and/or LAD .    Normal LV function.    Overall findings represent a intermediate risk scan            I independently reviewed the cardiac diagnostic studies, ECG and relevant imaging studies.            Lab Results   Component Value Date     LVEF 13 04/20/2021     LVEFMODE Echo 09/20/2019            Lab Results   Component Value Date     TSH 1.90 04/01/2021            Physical Examination:      Vitals:     05/18/21 1308   BP: 97/63   Pulse: 78   SpO2: 97%          Wt Readings from Last 3 Encounters:   05/18/21 193 lb (87.5 kg)   05/14/21 204 lb (92.5 kg)   05/10/21 197 lb (89.4 kg)         · Constitutional: Oriented. No distress. · Head: Normocephalic and atraumatic. · Mouth/Throat: Oropharynx is clear and moist.   · Eyes: Conjunctivae normal. EOM are normal.   · Neck: Neck supple. No JVD present. · Cardiovascular: Normal rate, Irregular rhythm, S1&S2. · Pulmonary/Chest: Bilateral respiratory sounds. No rhonchi. · Abdominal: Soft. No tenderness. · Musculoskeletal: No tenderness. No edema    · Lymphadenopathy: Has no cervical adenopathy. · Neurological: Alert and oriented. Follows command, No Gross deficit   · Skin: Skin is warm, No rash noted. · Psychiatric: Has a normal behavior            Review of System:  [x]? Full ROS obtained and negative except as mentioned in HPI     Home Medications           Prior to Admission medications    Medication Sig Start Date End Date Taking?  Authorizing Provider   enoxaparin (LOVENOX) 100 MG/ML injection Inject 0.9 mLs into the skin 2 times daily 5/13/21   Yes Shelby Zapata MD   spironolactone (ALDACTONE) 25 MG tablet Take 1 tablet by mouth 2 times daily 5/10/21   Yes Armando Morataya MD   magnesium oxide (MAG-OX) 400 MG tablet Take 1 tablet by mouth daily 5/10/21   Yes Armando Morataya MD   torsemide (DEMADEX) 100 MG tablet Take 0.5 tablets by mouth 2 times daily 5/10/21   Yes Armando Morataya MD   amiodarone (CORDARONE) 200 MG tablet Take 1 tablet by mouth 2 times daily 5/5/21   Yes Scot Avalos MD   losartan (COZAAR) 25 MG tablet Take 1 tablet by mouth daily 5/5/21   Yes Scot Avalos MD   metoprolol succinate (TOPROL XL) 50 MG extended release tablet Take 1 tablet by mouth daily 5/5/21   Yes Scot Avalos MD   potassium chloride (KLOR-CON M) 10 MEQ extended release tablet TAKE 1 TABLET DAILY 4/21/21   Yes Fiona Velez MD   montelukast (SINGULAIR) 10 MG tablet TAKE 1 TABLET NIGHTLY 4/21/21   Yes Fiona Velez MD   warfarin (COUMADIN) 5 MG tablet TAKE 1 TABLET DAILY  Patient taking differently: Managed by PCP 4/21/21   Yes Fiona Velez MD   insulin glargine (LANTUS SOLOSTAR) 100 UNIT/ML injection pen INJECT 26 UNITS IN THE MORNING AND 18 UNITS AT BEDTIME  Patient taking differently: INJECT 26 UNITS IN THE MORNING AND 18 UNITS AT BEDTIME (evening when needed) 3/10/21   Yes An Dela Cruz MD   exenatide (BYETTA 10 MCG PEN) 10 MCG/0.04ML injection Inject 0.04 mLs into the skin 2 times daily (with meals) 3/3/21   Yes Fiona Velez MD   OXYCODONE HCL PO Take 10 mg by mouth As of 1/21/2021,  states patient is taking 10mg with edge being taken off her pain after 3 hours.     Yes Historical Provider, MD   ACETAMINOPHEN PO Take 500 mg by mouth every 6 hours as needed      Yes Historical Provider, MD   blood glucose test strips (FREESTYLE LITE) strip USE TO TEST FOUR TIMES A DAY 1/5/21   Yes Fiona Velez MD   albuterol sulfate  (90 Base) MCG/ACT inhaler USE 2 INHALATIONS EVERY 6 HOURS AS NEEDED FOR WHEEZING 11/17/20   Yes Leandro Zeng MD   albuterol (PROVENTIL) (2.5 MG/3ML) 0.083% nebulizer solution Take 3 mLs by nebulization every 6 hours as needed for Wheezing 6/2/20   Yes Leandro Zeng MD   polyethylene glycol (GLYCOLAX) 17 GM/SCOOP powder Take 17 g by mouth every other day      Yes Historical Provider, MD   omeprazole (PRILOSEC) 20 MG delayed release capsule Take 20 mg by mouth daily     Yes Historical Provider, MD   FreeStyle Lancets MISC 1 each by Does not apply route 4 times daily 4/29/20   Yes Leandro Zeng MD   ondansetron (ZOFRAN) 4 MG tablet Take 1 tablet by mouth 3 times daily as needed for Nausea or Vomiting 3/26/20   Yes Leandro Zeng MD   Blood Glucose Monitoring Suppl ([employer] PRO GLUCOSE METER) GAETANO 1 Device by Does not apply route 4 times daily 2/4/20   Yes Leandro Zeng MD   HUMALOG KWIKPEN 100 UNIT/ML SOPN TAKE AS INSTRUCTED BY YOUR PRESCRIBER  Patient taking differently:  Only if high blood sugar-has not been using 12/30/19   Yes Leandro Zeng MD   nystatin (MYCOSTATIN) 042773 UNIT/ML suspension TAKE ONE TEASPOONFUL BY MOUTH FOUR TIMES A DAY FOR 5 DAYS 11/20/19   Yes Leandro Zeng MD   aspirin 81 MG chewable tablet Take 1 tablet by mouth daily 6/7/19   Yes Leandro Zeng MD   BD PEN NEEDLE JANNET U/F 32G X 4 MM MISC USE 1 PEN NEEDLE FOUR TIMES A DAY 3/22/19   Yes Leandro Zeng MD   vitamin B-12 500 MCG tablet Take 1 tablet by mouth daily 10/12/18   Yes Micheline Jensen MD                  Allergies   Allergen Reactions    Rurgvwhp-Mqcozsk-Ioqqom [Fluocinolone] Shortness Of Breath    Ciprofloxacin Shortness Of Breath    Diovan [Valsartan] Shortness Of Breath    Flagyl [Metronidazole] Shortness Of Breath       Has taken diflucan at home 12/7/15    Metformin And Related [Metformin And Related] Shortness Of Breath    Benazepril         Other reaction(s): Not Recorded    Morphine         Bad reaction. \"makes her feel horrible\".  Saxagliptin         Other reaction(s): Not Recorded    Levaquin [Levofloxacin] Rash         Social History:  Reviewed. reports that she has never smoked. She has never used smokeless tobacco. She reports that she does not drink alcohol and does not use drugs.      Family History:  Reviewed. Reviewed. No family history of SCD. Assessment and plan:      - Ischemic cardiomyopathy, severe LV systolic function with H-90%, NYHAFC III, Stage C on medical therapy              EF per echo 2021 10-15%              On GMT metoprolol 50 mg daily, torsemide 50 mg BID and spironolactone 25 mg BID              EF decreased despite GMT and no further ischemic causes               Patient is on guideline directed medical therapy for more than three months. Decision aid tool for patient considering ICD implantation for primary prevention \"[place] Program for Patient-Centered Decision\" were used for shared decision making and a copy was given to patient for review. Patient has a resonable expectation of survival of more than one year. Patient is a candidate for AICD implantation for primary               I have discussed the procedure, risks, benefits and alternatives in detail with patient and family. I gave printed material about AICD implantation, risks and benefits. The risks including, but not limited to, the risks of bleeding, infection, pain, device malfunction, lead dislodgement, radiation exposure, injury to cardiac and surrounding structures (including pneumothorax), stroke, cardiac perforation, tamponade, need for emergent heart surgery, myocardial infarction and death were discussed in detail.  The patient opted to proceed with the device implantation.                  dual chamber ICD due to Atrial fibrillation ,  and removal of Loop Implant                  Hold Coumadin 2 days before                 -Persistent Atrial

## 2021-07-07 NOTE — PROCEDURES
[address]     Electrophysiology Procedure Note       Date of Procedure: 7/7/2021  Patient's Name: Magdaline Dandy  YOB: 1945   Medical Record Number: [id number]  Referring Physician: MD Nahun Andrews  Procedure Performed by: Kindra Santana MD    Procedures performed:     · Insertion of MRI compatible right ventricular  lead under fluoroscopy. · Insertion of MRI compatible right atrial lead under fluoroscopy  · Insertion of a MRI compatible  dual  chamber ICD  · IV sedation. · Ultrasound for access  · Removal of implantable loop monitor  · Programming and analysis of the device      Indication of the procedure: Patient of Nikolai Reyez is a 77 y.o. female with ischemic cardiomyopathy and ejection fraction of 15  %. A dual chamber ICD was implanted for due to LV dysfunction, congestive heart failure, and atrial fibrillation and requirement for atrial therapies and management. To  provide physiological pacing, the need for atrial pacing and avoiding ventricular pacing in the setting of heart failure, and avoid pacemaker syndrome. Details of procedure: The patient was brought to the electrophysiology laboratory in stable condition. The patient was in a fasting and non-sedated state. The risks, benefits and alternatives of the procedure were discussed with the patient . The risks including, but not limited to, the risks of vascular injury, bleeding, infection, device malfunction, lead dislodgement, radiation exposure, injury to cardiac and surrounding structures (including pneumothorax), stroke, myocardial infarction and death were discussed in detail. Patient opted to proceed with the device implantation. Written informed consent was signed and placed in the chart. Prophylactic antibiotic was given. Access was obtained using ultrasound. An independent trained observer pushed medications at my direction.  We monitored the patient's level of consciousness and vital signs/physiologic status throughout the procedure duration (see start and stop times below). Sedation:  4 mg Versed, 150 mcg Fentanyl  Sedation start: 0750  Sedation stop: 0909        The patient was prepped and draped in a sterile fashion. A timeout protocol was completed to identify the patient and the procedure being performed. Pre-sedation evaluation and airway assessment (Mallampatti classification, class lI) was completed. IV sedation was provided with IV Versed, Fentanyl. An incision was made in the left pectoral area after administration of lidocaine. Using electrocautery and blunt dissection, a pocket was created. Central venous access into the left axillary vein was obtained using the modified Seldinger technique. After central venous access was obtained, a sheath was placed in the axillary vein. A right ventricular pacing lead was advanced into position on the apical septum under fluoroscopic guidance and using a series of curved stylets. The lead was actively fixated. After confirming appropriate function, the sheath was split and removed. The lead was secured to the underlying tissue using suture material.     A new sheath was advanced over a second previously placed wire into the vein. The atrial lead was advanced to the right atrial appendage under fluoroscopic guidance. The lead was actively fixated. After confirming appropriate function, the sheath was split and removed. The lead was secured to the underlying tissue using suture. The pocket was irrigated with saline solution. The leads were then connected to the new pulse generator,  dual/chamber ICD, which was then placed into the cleaned pocket. The pulse generator was sutured inside the pocket. The pocket was then closed in three separate subcutaneous layers using  3-0 Vicryl and subcuticular layer using 4-0 Vicryl . The skin was covered with pressure dressing. Steristrips was used on the skin. Antibiotic envelope was used. Then injection was made over the old incision for the loop. A small incision was made and using electrocautery and blunt dissection the loop was approached and removed. The skin was closed using 3-0 Vicryl and the Steri-Strips. All sponge and needle counts were reported as correct at the end of the procedure. The patient tolerated the procedure well and there were no complications. Post-sedation evaluation was completed. Patient was transported to the holding area in stable condition. Blood NLYT<71 cc  No complication  Lead and device information:         Plan:     The patient will be observed and if is stable can be discharged home. Chest x-ray will be done and she will be followed up in the office. We will decrease the dose of amiodarone to 200 mg once a day. Depending on recurrence of atrial fibrillation she may be a candidate for ablation.

## 2021-07-14 ENCOUNTER — NURSE ONLY (OUTPATIENT)
Dept: CARDIOLOGY CLINIC | Age: 76
End: 2021-07-14
Payer: MEDICARE

## 2021-07-14 ENCOUNTER — TELEPHONE (OUTPATIENT)
Dept: CARDIOLOGY CLINIC | Age: 76
End: 2021-07-14

## 2021-07-14 DIAGNOSIS — Z95.810 ICD (IMPLANTABLE CARDIOVERTER-DEFIBRILLATOR), DUAL, IN SITU: ICD-10-CM

## 2021-07-14 DIAGNOSIS — I50.22 CHRONIC SYSTOLIC CHF (CONGESTIVE HEART FAILURE), NYHA CLASS 3 (HCC): ICD-10-CM

## 2021-07-14 DIAGNOSIS — I47.1 PAROXYSMAL SVT (SUPRAVENTRICULAR TACHYCARDIA) (HCC): ICD-10-CM

## 2021-07-14 DIAGNOSIS — I48.0 PAF (PAROXYSMAL ATRIAL FIBRILLATION) (HCC): ICD-10-CM

## 2021-07-14 DIAGNOSIS — I25.5 ISCHEMIC CARDIOMYOPATHY: ICD-10-CM

## 2021-07-14 PROCEDURE — 93283 PRGRMG EVAL IMPLANTABLE DFB: CPT | Performed by: INTERNAL MEDICINE

## 2021-07-14 RX ORDER — TORSEMIDE 100 MG/1
50 TABLET ORAL 2 TIMES DAILY
Qty: 45 TABLET | Refills: 0
Start: 2021-07-14 | End: 2021-08-13 | Stop reason: DRUGHIGH

## 2021-07-14 NOTE — PROGRESS NOTES
Patient comes in for programming evaluation for her defibrillator. All sensing and pacing parameters are within normal range. mdtdcicd Implant/mdtlinq explant  Battery life 11 years  AP 0.5%.  <0.1%. No episodes noted. Patient remains on warfarin, amiodarone and metoprolol. Patients incisions is healing nicely. Patient to call the office immediately with any signs on infection. No changes need to be made at this time. Please see interrogation for more detail. Optivol is initializing. Patient will follow up in 3 months in office or remotely.

## 2021-07-14 NOTE — TELEPHONE ENCOUNTER
She was in for device check today and dropped off her vitals and weights  Her BP couple times has been in the 80s.   She is taking midodrine 1-2 times a week  Weight is down 198->193    Will cut torsemide to 50 mg in am and 40 mg in pm  If weight>3 pounds then take 50 mg bid of torsemide  Take midodrine twice a day only hold if BP>110  Continue all other medications

## 2021-07-15 ENCOUNTER — TELEPHONE (OUTPATIENT)
Dept: FAMILY MEDICINE CLINIC | Age: 76
End: 2021-07-15

## 2021-07-15 LAB — INR BLD: 1.7

## 2021-07-15 NOTE — TELEPHONE ENCOUNTER
Scot Coyne from [employer] called to report out of range result.     As of 7/15/21 INR = 1.7    Provider out of office       Please advise

## 2021-07-16 ENCOUNTER — TELEPHONE (OUTPATIENT)
Dept: CARDIOLOGY CLINIC | Age: 76
End: 2021-07-16

## 2021-07-16 ENCOUNTER — ANTI-COAG VISIT (OUTPATIENT)
Dept: FAMILY MEDICINE CLINIC | Age: 76
End: 2021-07-16

## 2021-07-16 NOTE — TELEPHONE ENCOUNTER
Spoke to pt and  Joselyn Ortiz with education regarding CardioMEMS follow up and to see if they are interested in pursuing CardioMEMS implant. Discussed insurance approval timeline of 4-6 weeks, actual implant procedure, and daily pillow readings to help manage HF treatment with early detection of volume based on pillow reading. Pt states she wanted me to also talk with her . Both pt and  on the phone during education. Pt and  offered video education, given web site information to view and they will think about it.   She is recovering from her ICD placement/ removal of loop monitor on 7/7/21

## 2021-07-27 ENCOUNTER — TELEPHONE (OUTPATIENT)
Dept: FAMILY MEDICINE CLINIC | Age: 76
End: 2021-07-27

## 2021-07-29 ENCOUNTER — OFFICE VISIT (OUTPATIENT)
Dept: ORTHOPEDIC SURGERY | Age: 76
End: 2021-07-29
Payer: MEDICARE

## 2021-07-29 VITALS — HEIGHT: 68 IN | WEIGHT: 198 LBS | BODY MASS INDEX: 30.01 KG/M2

## 2021-07-29 DIAGNOSIS — M17.0 BILATERAL PRIMARY OSTEOARTHRITIS OF KNEE: Primary | ICD-10-CM

## 2021-07-29 PROCEDURE — 20610 DRAIN/INJ JOINT/BURSA W/O US: CPT | Performed by: ORTHOPAEDIC SURGERY

## 2021-07-29 PROCEDURE — 99213 OFFICE O/P EST LOW 20 MIN: CPT | Performed by: ORTHOPAEDIC SURGERY

## 2021-07-29 RX ORDER — METHYLPREDNISOLONE ACETATE 40 MG/ML
40 INJECTION, SUSPENSION INTRA-ARTICULAR; INTRALESIONAL; INTRAMUSCULAR; SOFT TISSUE ONCE
Status: COMPLETED | OUTPATIENT
Start: 2021-07-29 | End: 2021-07-29

## 2021-07-29 RX ORDER — LIDOCAINE HYDROCHLORIDE 10 MG/ML
2 INJECTION, SOLUTION INFILTRATION; PERINEURAL ONCE
Status: COMPLETED | OUTPATIENT
Start: 2021-07-29 | End: 2021-07-29

## 2021-07-29 RX ADMIN — METHYLPREDNISOLONE ACETATE 40 MG: 40 INJECTION, SUSPENSION INTRA-ARTICULAR; INTRALESIONAL; INTRAMUSCULAR; SOFT TISSUE at 14:05

## 2021-07-29 RX ADMIN — LIDOCAINE HYDROCHLORIDE 2 ML: 10 INJECTION, SOLUTION INFILTRATION; PERINEURAL at 14:04

## 2021-07-29 NOTE — PROGRESS NOTES
[address] [facility]  Outpatient Progress Note  Radha George MD    Patient Name: Carter Henderson MRN: [id number]   Age: 77 y.o. YOB: 1945   Sex: female      [address] Complaint   Patient presents with    Knee Pain     Bilateral knee pain left greater than right, gel inj 2019 tx llv helped some, recent flare of pain x 3 weeks no recent imaging       HISTORY OF PRESENT ILLNESS   Carter Henderson is a 77 y.o. female previous patient of mine from [facility] who has known osteoarthritis of the knees. She had a series of viscosupplementation injections a couple of years ago and had excellent relief of her pain symptoms. Her symptoms were gradually worse and now she is having significant pain with ambulation left greater than right.     Pain Assessment  Location of Pain: Knee  Location Modifiers: Left  Severity of Pain: 5  Quality of Pain: Sharp, Aching  Duration of Pain: Persistent  Frequency of Pain: Intermittent  Aggravating Factors: Standing, Walking, Exercise  Limiting Behavior: Yes  Relieving Factors: Rest  Result of Injury: No  Work-Related Injury: No  Are there other pain locations you wish to document?: No    PAST MEDICAL HISTORY      Past Medical History:   Diagnosis Date    Asthma     Atrial fibrillation (HCC)     Eosinophilia     Hemoptysis     HIGH CHOLESTEROL     Hx of blood clots     Hypertension     Irregular heart beat     Other specified gastritis without mention of hemorrhage     Palpitations     Skin cancer     basal and squamous    Type II or unspecified type diabetes mellitus without mention of complication, not stated as uncontrolled        PAST SURGICAL HISTORY     Past Surgical History:   Procedure Laterality Date    BRONCHOSCOPY  2016    Dr. Josselin Ceballos - brushings from [address] [place] [address],[facility]-  2018    Dr. Adrianna Curran and 5/3 2021    Cardiac cath, cardioversion and rafat     SECTION      CHOLECYSTECTOMY      COLONOSCOPY  02/07/2017    Dr. Alannah Stapleton - sigmoid diverticulosis, polypectomies x3    COLONOSCOPY  01/17/2014    Dr. Alannah Stapleton - sigmoid diverticulosis, polypectomies x3, internal hemorrhoids    COLONOSCOPY  10/10/2018    w/biopsy performed by Randy Mccartney MD at [address] N/A 8/7/2020    COLONOSCOPY DIAGNOSTIC performed by Jordan Perry MD at [address]  08/21/2018    Dr. Mouna Navas - x3 (LIMA-LAD, L SV-D1-PLV) modified BL MAZE procedure w/obliteration of WAYNE using 45mm AtriClip    HYSTERECTOMY      INSERTABLE CARDIAC MONITOR Left 08/16/2018    Dr. Zoey Colon - Torie Gainesan SN# [id number] [employer]    MITRAL VALVE REPLACEMENT  08/21/2018    Dr. Mouna Navas - 27mm [employer] Cinch tissue valve    PAIN MANAGEMENT PROCEDURE N/A 12/18/2020    C6-C7 MIDLINE  EPIDURAL STEROID INJECTION WITH FLUOROSCOPY performed by Little Mckinney MD at [address] Bilateral 1/18/2021    BILATERAL T11 TRANSFORAMINAL EPIDURAL STEROID INJECTION WITH FLUOROSCOPY performed by Little Mckinney MD at [address] TRANSESOPHAGEAL ECHOCARDIOGRAM  08/21/2018    during CABG/MVR    TUNNELED VENOUS CATHETER PLACEMENT Left 08/23/2018    Dr. Ericka Garzon - IJ for HD---since removed    UPPER GASTROINTESTINAL ENDOSCOPY N/A 10/10/2018    w/biopsy performed by Randy Mccartney MD at [address]     Current Outpatient Medications   Medication Sig Dispense Refill    torsemide (DEMADEX) 100 MG tablet Take 0.5 tablets by mouth 2 times daily 50 mg in am and 40 mg in pm, ok to take 50 mg in pm if weight up 3 pounds 45 tablet 0    amiodarone (CORDARONE) 200 MG tablet Take 1 tablet by mouth daily 60 tablet 2    Insulin Pen Needle (BD PEN NEEDLE JANNET U/F) 32G X 4 MM MISC USE 1 PEN NEEDLE FOUR TIMES A  each 3    ciclopirox (LOPROX) 0.77 % cream Apply topically 2 times daily.  30 g 0    midodrine (PROAMATINE) 2.5 MG tablet Take 1 tablet by mouth 2 times daily 60 tablet 0    metoprolol succinate (TOPROL XL) 50 MG extended release tablet Take 1 tablet by mouth daily 90 tablet 1    losartan (COZAAR) 25 MG tablet Take 1 tablet by mouth daily 90 tablet 1    spironolactone (ALDACTONE) 25 MG tablet Take 1 tablet by mouth 2 times daily 60 tablet 3    magnesium oxide (MAG-OX) 400 MG tablet Take 1 tablet by mouth daily 90 tablet 3    potassium chloride (KLOR-CON M) 10 MEQ extended release tablet TAKE 1 TABLET DAILY 90 tablet 0    montelukast (SINGULAIR) 10 MG tablet TAKE 1 TABLET NIGHTLY 90 tablet 0    warfarin (COUMADIN) 5 MG tablet TAKE 1 TABLET DAILY (Patient taking differently: Managed by PCP) 100 tablet 0    insulin glargine (LANTUS SOLOSTAR) 100 UNIT/ML injection pen INJECT 26 UNITS IN THE MORNING AND 18 UNITS AT BEDTIME (Patient taking differently: INJECT 26 UNITS IN THE MORNING AND 18 UNITS AT BEDTIME (evening when needed)) 90 mL 1    exenatide (BYETTA 10 MCG PEN) 10 MCG/0.04ML injection Inject 0.04 mLs into the skin 2 times daily (with meals) 3 pen 1    ACETAMINOPHEN PO Take 500 mg by mouth every 6 hours as needed       blood glucose test strips (FREESTYLE LITE) strip USE TO TEST FOUR TIMES A  strip 4    albuterol sulfate  (90 Base) MCG/ACT inhaler USE 2 INHALATIONS EVERY 6 HOURS AS NEEDED FOR WHEEZING 25.5 g 2    albuterol (PROVENTIL) (2.5 MG/3ML) 0.083% nebulizer solution Take 3 mLs by nebulization every 6 hours as needed for Wheezing 120 each 3    polyethylene glycol (GLYCOLAX) 17 GM/SCOOP powder Take 17 g by mouth every other day       omeprazole (PRILOSEC) 20 MG delayed release capsule Take 20 mg by mouth daily      FreeStyle Lancets MISC 1 each by Does not apply route 4 times daily 200 each 5    ondansetron (ZOFRAN) 4 MG tablet Take 1 tablet by mouth 3 times daily as needed for Nausea or Vomiting 30 tablet 0    Blood Glucose Monitoring Suppl (EMBRACE PRO GLUCOSE METER) GAETANO 1 Device by Does not apply route 4 times daily 1 Device 0    HUMALOG KWIKPEN 100 UNIT/ML SOPN TAKE AS INSTRUCTED BY YOUR PRESCRIBER (Patient taking differently: Only if high blood sugar-has not been using) 15 mL 8    nystatin (MYCOSTATIN) 399748 UNIT/ML suspension TAKE ONE TEASPOONFUL BY MOUTH FOUR TIMES A DAY FOR 5 DAYS 1 Bottle 2    aspirin 81 MG chewable tablet Take 1 tablet by mouth daily 30 tablet 3    vitamin B-12 500 MCG tablet Take 1 tablet by mouth daily 30 tablet 3     No current facility-administered medications for this visit. ALLERGIES     Allergies   Allergen Reactions    Ibubxelc-Bjejzqd-Iporwv [Fluocinolone] Shortness Of Breath    Ciprofloxacin Shortness Of Breath    Diovan [Valsartan] Shortness Of Breath    Flagyl [Metronidazole] Shortness Of Breath     Has taken diflucan at home 12/7/15    Metformin And Related [Metformin And Related] Shortness Of Breath    Benazepril      Other reaction(s): Not Recorded    Morphine      Bad reaction. \"makes her feel horrible\".      Saxagliptin      Other reaction(s): Not Recorded    Levaquin [Levofloxacin] Rash       FAMILY HISTORY     Family History   Problem Relation Age of Onset    Cancer Father     Asthma Mother     Hypertension Mother     Heart Disease Mother     High Blood Pressure Mother        SOCIAL HISTORY     Social History     Socioeconomic History    Marital status:      Spouse name: None    Number of children: None    Years of education: None    Highest education level: None   Occupational History    None   Tobacco Use    Smoking status: Never Smoker    Smokeless tobacco: Never Used   Vaping Use    Vaping Use: Never used   Substance and Sexual Activity    Alcohol use: No    Drug use: No    Sexual activity: None   Other Topics Concern    None   Social History Narrative    None     Social Determinants of Health     Financial Resource Strain:     Difficulty of Paying Living Expenses:    Food Insecurity:  Worried About [facility] in the Last Year:    [address] in the Last Year:    Transportation Needs:     Lack of Transportation (Medical):  Lack of Transportation (Non-Medical):    Physical Activity:     Days of Exercise per Week:     Minutes of Exercise per Session:    Stress:     Feeling of Stress :    Social Connections:     Frequency of Communication with Friends and Family:     Frequency of Social Gatherings with Friends and Family:     Attends Temple Services:     Active Member of Clubs or Organizations:     Attends Club or Organization Meetings:     Marital Status:    Intimate Partner Violence:     Fear of Current or Ex-Partner:     Emotionally Abused:     Physically Abused:     Sexually Abused:        REVIEW OF SYSTEMS   General: no fever, chills, night sweats, anorexia, malaise, fatigue, or weight change  Hematologic:  no unexplained bleeding or bruising  HEENT:   no nasal congestion, rhinorrhea, sore throat, or facial pain  Respiratory:  no cough, dyspnea, or chest pain  Cardiovascular:  no angina, LEVIN, PND, orthopnea, dependent edema, or palpitations  Gastrointestinal:  no nausea, vomiting, diarrhea, constipation, or abdominal pain  Genitourinary:  no urinary urgency, frequency, dysuria, or hematuria  Musculoskeletal: see HPI  Endocrine:  no heat or cold intolerance and no polyphagia, polydipsia, or polyuria  Skin:  no skin eruptions or changing lesions  Neurologic:  no focal weakness, numbness/tingling, tremor, or severe headache. See HPI. See HPI for pertinent positives. PHYSICAL EXAM   Vital Signs: Ht 5' 8\" (1.727 m)   Wt 198 lb (89.8 kg)   BMI 30.11 kg/m²     General appearance: healthy, alert, no distress  Skin: Skin color, texture, turgor normal. No rashes or lesions  HEENT: atraumatic, normocephalic.  PERRL  Respiratory: Unlabored breathing  Lymphatic: No adenopathy   Neuro: Alert and oriented, normal distal sensation, normal bilateral DTRs  Vascular: Normal distal capillary and distal pulses  Muskuloskeletal Exam:     Bilateral Knee Examination    Left Knee Examination    Inspection: Inspection of the knee reveals no swelling, ecchymosis or deformity. and Mild varus alignment is noted    Palpation: There is no evidence of knee effusion. , There is severe tenderness along the medial joint line. and moderate patellar crepitus is noted. Range of Motion: 0-90    Strength: Hamstrings rated: 5/5. Quadricepts rated: 5/5. Special Tests: Drawer, Lachman, Mary, Patellar Apprehension, Patellar Compression, Pivot shift, Valgus & Varus tests are normal.     Gait: Gait is antalgic favoring the affected side. Right Knee Examination    Inspection: Inspection of the knee reveals no swelling, ecchymosis or deformity. Palpation: There is no evidence of knee effusion. , There is moderate tenderness along the medial joint line. and moderate patellar crepitus is noted. Range of Motion: 0-90    Strength: Hamstrings rated: 5/5. Quadricepts rated: 5/5. Special Tests: Drawer, Lachman, Mary, Patellar Apprehension, Patellar Compression, Pivot shift, Valgus & Varus tests are normal.     Gait: Gait is antalgic favoring the affected side. RADIOLOGY   X-rays obtained and reviewed in office:  Views bilateral knees standing 3 views    Impression: Severe tricompartmental degenerative change with complete collapse of the medial compartment of the left knee. Near complete collapse of the lateral compartment of the right knee, subchondral sclerosis, marginal osteophyte formation    IMPRESSION     1. Bilateral primary osteoarthritis of knee         PLAN   I had a lengthy discussion with patient today regarding diagnosis and treatment options and recommendations. Because of multiple medical comorbidities the patient is not a candidate for total knee replacement. I believe she would benefit from a series of viscosupplementation injections.   We will inject both knees with steroid today and have her back when the Visco is available after insurance authorization. I have also recommended activity modification, ambulatory assistive devices, topical anti-inflammatory medications    PROCEDURE     Knee Injection:  The patient consented to the procedure and a time out was performed before proceeding. The anterolateral portal of the bilateral knee was prepped in the normal sterile fashion. A 25 gauge needle was introduced into the intra-articular space. 2 mL of 1% Lidocaine and 1 mL of 40 mg/mL Depo-Medrol was injected into the joint space. The injection flowed freely, and the patient tolerated the procedure well. Post injection expectations were reviewed with the patient. FOLLOWUP     Return for viscosupplementation injections. Orders Placed This Encounter   Procedures    XR KNEE RIGHT (3 VIEWS)     Standing Status:   Future     Number of Occurrences:   1     Standing Expiration Date:   7/29/2022     Order Specific Question:   Reason for exam:     Answer:   pain    XR KNEE LEFT (3 VIEWS)     Standing Status:   Future     Number of Occurrences:   1     Standing Expiration Date:   7/29/2022     Order Specific Question:   Reason for exam:     Answer:   pain      No orders of the defined types were placed in this encounter.       Patient was instructed on appropriate use of braces, participation in home exercise programs, healthy lifestyle choices and weight loss as appropriate     Kei Doe MD

## 2021-07-29 NOTE — PATIENT INSTRUCTIONS
Joint Injection After [address] [facility]  Merissa Babcock MD    Refer to this sheet in the next few days. These insructions provide you with information on caring for yourself after you have had a joint injection. Your caregiver also may give you more specific instructions. Your treatment has been planned according to current medical practices, but problems sometimes occur. Call your caregiver if you have any problems or questions after your procedure. After any type of joint injection, it is not uncommon to experience:     A temporary increase in pain which should resolve within 2-3 days   Soreness, swelling, or bruising around the injection site   Mild numbness, tingling, or weakness around the injection site caused by the numbing medicine used before or with the injection    It is also possible to experience the following effects associated with the specific agent after injection:     Corticosteroids (these effects are rare):  o Allergic reaction  o Increased blood sugar levels (if you have diabetes and you notice that your blood sugar levels have increased, notify your caregiver)  o Increased blood pressure levels  o Mood swings   Hyaluronic acid in the use of viscosupplementation  o Temporary heat or redness  o Temporary rash and itching  o Increased fluid accumulation in the injected join    These effects all should resolve within a day or two after your procedure. HOME CARE INSTRUCTIONS     Limit yourself to light activity the day of your procedure. Avoid lifting heavy objections, bending, stooping or twisting   Take prescription or over-the-counter pain medication as directed by your caregiver   You may apply ice to your injection site to reduce pain and swelling the day of your procedure.  Ice may be applied 3-4 times:  o Put ice in a plastic bag  o Place a towel between your skin and the bag  o Leave the ice on for no longer than 15-20 minutes each time    FOLLOW-UP INSTRUCTIONS    Please make sure you keep your follow-up appointment as indicated by your physician.

## 2021-07-31 DIAGNOSIS — E11.69 DIABETES MELLITUS TYPE 2 IN OBESE (HCC): ICD-10-CM

## 2021-07-31 DIAGNOSIS — E66.9 DIABETES MELLITUS TYPE 2 IN OBESE (HCC): ICD-10-CM

## 2021-08-02 RX ORDER — BLOOD-GLUCOSE METER
KIT MISCELLANEOUS
Qty: 200 STRIP | Refills: 3 | Status: SHIPPED | OUTPATIENT
Start: 2021-08-02 | End: 2022-02-15

## 2021-08-02 NOTE — TELEPHONE ENCOUNTER
Medication:   Requested Prescriptions     Pending Prescriptions Disp Refills    FREESTYLE LITE strip [Pharmacy Med Name: FREESTYLE LITE TEST STRIP] 200 strip 3     Sig: USE TO TEST FOUR TIMES A DAY      Last Filled:      Patient Phone Number: [phone] (home)     Last appt: 6/14/2021   Next appt: 8/17/2021    Last OARRS:   RX Monitoring 3/1/2019   Attestation The Prescription Monitoring Report for this patient was reviewed today.    Periodic Controlled Substance Monitoring -     PDMP Monitoring:    Last PDMP Nancy Beckett as Reviewed [facility]):  Review User Review Instant Review Result   Sarah Barr 6/14/2021  7:59 AM Reviewed PDMP [1]     Preferred Pharmacy:   [address] [place] [address], [address] [phone] - F [phone]  [place] [address]  Phone: [phone] Fax: [phone]    [facility] [address], [address] [facility] [phone] [facility] [phone]  [address] [facility] [address]  [place] [address]  Phone: [phone] Fax: [phone]

## 2021-08-03 RX ORDER — WARFARIN SODIUM 5 MG/1
TABLET ORAL
Qty: 100 TABLET | Refills: 3 | Status: SHIPPED | OUTPATIENT
Start: 2021-08-03 | End: 2022-09-14

## 2021-08-03 RX ORDER — POTASSIUM CHLORIDE 750 MG/1
TABLET, EXTENDED RELEASE ORAL
Qty: 90 TABLET | Refills: 3 | Status: SHIPPED | OUTPATIENT
Start: 2021-08-03 | End: 2022-06-24

## 2021-08-03 RX ORDER — MONTELUKAST SODIUM 10 MG/1
TABLET ORAL
Qty: 90 TABLET | Refills: 3 | Status: SHIPPED | OUTPATIENT
Start: 2021-08-03 | End: 2022-06-24

## 2021-08-03 RX ORDER — EXENATIDE 250 UG/ML
INJECTION SUBCUTANEOUS
Qty: 7.2 ML | Refills: 3 | Status: SHIPPED | OUTPATIENT
Start: 2021-08-03 | End: 2021-08-17 | Stop reason: ALTCHOICE

## 2021-08-03 RX ORDER — SPIRONOLACTONE 25 MG/1
TABLET ORAL
Qty: 90 TABLET | Refills: 3 | Status: SHIPPED | OUTPATIENT
Start: 2021-08-03 | End: 2021-08-13

## 2021-08-05 ENCOUNTER — TELEPHONE (OUTPATIENT)
Dept: ORTHOPEDIC SURGERY | Age: 76
End: 2021-08-05

## 2021-08-05 NOTE — TELEPHONE ENCOUNTER
General Question     Subject: PATIENT RECEIVED A LETTER \"AUTHORIZING YOUR SERVICES\" FOR VERNA PERKINS TO ADMINISTER. PATIENT WOULD LIKE TO KNOW IF THE GEL HAS BEEN APPROVED AND SHOULD SHE SCHEDULE AN APPOINTMENT.     Patient:   Balwinder Abad Number: [phone]

## 2021-08-09 ENCOUNTER — NURSE ONLY (OUTPATIENT)
Dept: CARDIOLOGY CLINIC | Age: 76
End: 2021-08-09
Payer: MEDICARE

## 2021-08-09 DIAGNOSIS — I50.23 ACUTE ON CHRONIC SYSTOLIC CHF (CONGESTIVE HEART FAILURE) (HCC): ICD-10-CM

## 2021-08-09 DIAGNOSIS — I48.0 PAF (PAROXYSMAL ATRIAL FIBRILLATION) (HCC): ICD-10-CM

## 2021-08-09 DIAGNOSIS — I25.5 ISCHEMIC CARDIOMYOPATHY: ICD-10-CM

## 2021-08-09 DIAGNOSIS — Z95.810 ICD (IMPLANTABLE CARDIOVERTER-DEFIBRILLATOR), DUAL, IN SITU: ICD-10-CM

## 2021-08-09 DIAGNOSIS — I47.1 PAROXYSMAL SVT (SUPRAVENTRICULAR TACHYCARDIA) (HCC): ICD-10-CM

## 2021-08-09 DIAGNOSIS — I50.22 CHRONIC SYSTOLIC CHF (CONGESTIVE HEART FAILURE), NYHA CLASS 3 (HCC): ICD-10-CM

## 2021-08-09 PROCEDURE — 93297 REM INTERROG DEV EVAL ICPMS: CPT | Performed by: INTERNAL MEDICINE

## 2021-08-09 PROCEDURE — 93295 DEV INTERROG REMOTE 1/2/MLT: CPT | Performed by: INTERNAL MEDICINE

## 2021-08-09 PROCEDURE — 93296 REM INTERROG EVL PM/IDS: CPT | Performed by: INTERNAL MEDICINE

## 2021-08-09 NOTE — PROGRESS NOTES
We received remote transmission from patient's monitor at home. Transmission shows normal sensing and pacing function. EP physician will review. See interrogation under cardiology tab in the [facility] [address] field for more details. Optivol is within normal range.

## 2021-08-10 ENCOUNTER — OFFICE VISIT (OUTPATIENT)
Dept: ORTHOPEDIC SURGERY | Age: 76
End: 2021-08-10
Payer: MEDICARE

## 2021-08-10 VITALS — BODY MASS INDEX: 30.01 KG/M2 | HEIGHT: 68 IN | WEIGHT: 198 LBS

## 2021-08-10 DIAGNOSIS — M17.0 BILATERAL PRIMARY OSTEOARTHRITIS OF KNEE: Primary | ICD-10-CM

## 2021-08-10 PROCEDURE — 20610 DRAIN/INJ JOINT/BURSA W/O US: CPT | Performed by: ORTHOPAEDIC SURGERY

## 2021-08-10 PROCEDURE — 99213 OFFICE O/P EST LOW 20 MIN: CPT | Performed by: ORTHOPAEDIC SURGERY

## 2021-08-10 RX ORDER — HYALURONATE SODIUM 10 MG/ML
20 SYRINGE (ML) INTRAARTICULAR ONCE
Status: COMPLETED | OUTPATIENT
Start: 2021-08-10 | End: 2021-08-10

## 2021-08-10 RX ADMIN — Medication 20 MG: at 13:20

## 2021-08-10 NOTE — PATIENT INSTRUCTIONS
Joint Injection After [address] [facility]  Merissa Hardwick MD    Refer to this sheet in the next few days. These insructions provide you with information on caring for yourself after you have had a joint injection. Your caregiver also may give you more specific instructions. Your treatment has been planned according to current medical practices, but problems sometimes occur. Call your caregiver if you have any problems or questions after your procedure. After any type of joint injection, it is not uncommon to experience:     A temporary increase in pain which should resolve within 2-3 days   Soreness, swelling, or bruising around the injection site   Mild numbness, tingling, or weakness around the injection site caused by the numbing medicine used before or with the injection    It is also possible to experience the following effects associated with the specific agent after injection:     Corticosteroids (these effects are rare):  o Allergic reaction  o Increased blood sugar levels (if you have diabetes and you notice that your blood sugar levels have increased, notify your caregiver)  o Increased blood pressure levels  o Mood swings   Hyaluronic acid in the use of viscosupplementation  o Temporary heat or redness  o Temporary rash and itching  o Increased fluid accumulation in the injected join    These effects all should resolve within a day or two after your procedure. HOME CARE INSTRUCTIONS     Limit yourself to light activity the day of your procedure. Avoid lifting heavy objections, bending, stooping or twisting   Take prescription or over-the-counter pain medication as directed by your caregiver   You may apply ice to your injection site to reduce pain and swelling the day of your procedure.  Ice may be applied 3-4 times:  o Put ice in a plastic bag  o Place a towel between your skin and the bag  o Leave the ice on for no longer than 15-20 minutes each time    FOLLOW-UP INSTRUCTIONS    Please make sure you keep your follow-up appointment as indicated by your physician.

## 2021-08-12 ENCOUNTER — ANTI-COAG VISIT (OUTPATIENT)
Dept: FAMILY MEDICINE CLINIC | Age: 76
End: 2021-08-12

## 2021-08-12 ENCOUNTER — TELEPHONE (OUTPATIENT)
Dept: FAMILY MEDICINE CLINIC | Age: 76
End: 2021-08-12

## 2021-08-12 LAB — INR BLD: 1.7

## 2021-08-13 ENCOUNTER — HOSPITAL ENCOUNTER (OUTPATIENT)
Age: 76
Discharge: HOME | End: 2021-08-13
Payer: MEDICARE

## 2021-08-13 ENCOUNTER — TELEPHONE (OUTPATIENT)
Dept: CARDIOLOGY CLINIC | Age: 76
End: 2021-08-13

## 2021-08-13 ENCOUNTER — OFFICE VISIT (OUTPATIENT)
Dept: CARDIOLOGY CLINIC | Age: 76
End: 2021-08-13
Payer: MEDICARE

## 2021-08-13 VITALS
OXYGEN SATURATION: 97 % | SYSTOLIC BLOOD PRESSURE: 110 MMHG | HEART RATE: 67 BPM | BODY MASS INDEX: 30.14 KG/M2 | DIASTOLIC BLOOD PRESSURE: 66 MMHG | WEIGHT: 198.9 LBS | HEIGHT: 68 IN

## 2021-08-13 DIAGNOSIS — E11.69 DIABETES MELLITUS TYPE 2 IN OBESE (HCC): ICD-10-CM

## 2021-08-13 DIAGNOSIS — I35.0 NONRHEUMATIC AORTIC VALVE STENOSIS: ICD-10-CM

## 2021-08-13 DIAGNOSIS — I48.0 PAF (PAROXYSMAL ATRIAL FIBRILLATION) (HCC): ICD-10-CM

## 2021-08-13 DIAGNOSIS — M54.9 OTHER CHRONIC BACK PAIN: ICD-10-CM

## 2021-08-13 DIAGNOSIS — I50.22 CHRONIC SYSTOLIC CHF (CONGESTIVE HEART FAILURE), NYHA CLASS 3 (HCC): Primary | ICD-10-CM

## 2021-08-13 DIAGNOSIS — G89.29 OTHER CHRONIC BACK PAIN: ICD-10-CM

## 2021-08-13 DIAGNOSIS — I50.22 CHRONIC SYSTOLIC CHF (CONGESTIVE HEART FAILURE), NYHA CLASS 3 (HCC): ICD-10-CM

## 2021-08-13 DIAGNOSIS — E66.9 DIABETES MELLITUS TYPE 2 IN OBESE (HCC): ICD-10-CM

## 2021-08-13 DIAGNOSIS — Z95.1 S/P CABG X 3: ICD-10-CM

## 2021-08-13 DIAGNOSIS — K52.81 EOSINOPHILIC GASTRITIS: ICD-10-CM

## 2021-08-13 LAB
A/G RATIO: 0.9 (ref 1.1–2.2)
ALBUMIN SERPL-MCNC: 3.2 G/DL (ref 3.4–5)
ALP BLD-CCNC: 128 U/L (ref 40–129)
ALT SERPL-CCNC: 14 U/L (ref 10–40)
ANION GAP SERPL CALCULATED.3IONS-SCNC: 11 MMOL/L (ref 3–16)
AST SERPL-CCNC: 20 U/L (ref 15–37)
BASOPHILS ABSOLUTE: 0.1 K/UL (ref 0–0.2)
BASOPHILS RELATIVE PERCENT: 1 %
BILIRUB SERPL-MCNC: 0.3 MG/DL (ref 0–1)
BUN BLDV-MCNC: 36 MG/DL (ref 7–20)
CALCIUM SERPL-MCNC: 8.8 MG/DL (ref 8.3–10.6)
CHLORIDE BLD-SCNC: 98 MMOL/L (ref 99–110)
CO2: 26 MMOL/L (ref 21–32)
CREAT SERPL-MCNC: 2.2 MG/DL (ref 0.6–1.2)
EOSINOPHILS ABSOLUTE: 0.4 K/UL (ref 0–0.6)
EOSINOPHILS RELATIVE PERCENT: 5 %
GFR AFRICAN AMERICAN: 26
GFR NON-AFRICAN AMERICAN: 22
GLOBULIN: 3.4 G/DL
GLUCOSE BLD-MCNC: 120 MG/DL (ref 70–99)
HCT VFR BLD CALC: 37.5 % (ref 36–48)
HEMOGLOBIN: 12.5 G/DL (ref 12–16)
LYMPHOCYTES ABSOLUTE: 1.2 K/UL (ref 1–5.1)
LYMPHOCYTES RELATIVE PERCENT: 15 %
MAGNESIUM: 2.3 MG/DL (ref 1.8–2.4)
MCH RBC QN AUTO: 29.6 PG (ref 26–34)
MCHC RBC AUTO-ENTMCNC: 33.5 G/DL (ref 31–36)
MCV RBC AUTO: 88.5 FL (ref 80–100)
MONOCYTES ABSOLUTE: 0.7 K/UL (ref 0–1.3)
MONOCYTES RELATIVE PERCENT: 9 %
NEUTROPHILS ABSOLUTE: 5.7 K/UL (ref 1.7–7.7)
NEUTROPHILS RELATIVE PERCENT: 70 %
PDW BLD-RTO: 17.8 % (ref 12.4–15.4)
PLATELET # BLD: 191 K/UL (ref 135–450)
PLATELET SLIDE REVIEW: ADEQUATE
PMV BLD AUTO: 9.3 FL (ref 5–10.5)
POTASSIUM SERPL-SCNC: 4.7 MMOL/L (ref 3.5–5.1)
PRO-BNP: 2841 PG/ML (ref 0–449)
RBC # BLD: 4.23 M/UL (ref 4–5.2)
RBC # BLD: NORMAL 10*6/UL
SLIDE REVIEW: ABNORMAL
SODIUM BLD-SCNC: 135 MMOL/L (ref 136–145)
TOTAL PROTEIN: 6.6 G/DL (ref 6.4–8.2)
WBC # BLD: 8.2 K/UL (ref 4–11)

## 2021-08-13 PROCEDURE — 83880 ASSAY OF NATRIURETIC PEPTIDE: CPT

## 2021-08-13 PROCEDURE — 83735 ASSAY OF MAGNESIUM: CPT

## 2021-08-13 PROCEDURE — 99214 OFFICE O/P EST MOD 30 MIN: CPT | Performed by: CLINICAL NURSE SPECIALIST

## 2021-08-13 PROCEDURE — 80053 COMPREHEN METABOLIC PANEL: CPT

## 2021-08-13 PROCEDURE — 36415 COLL VENOUS BLD VENIPUNCTURE: CPT

## 2021-08-13 PROCEDURE — 83036 HEMOGLOBIN GLYCOSYLATED A1C: CPT

## 2021-08-13 PROCEDURE — 85025 COMPLETE CBC W/AUTO DIFF WBC: CPT

## 2021-08-13 RX ORDER — TORSEMIDE 20 MG/1
40 TABLET ORAL 2 TIMES DAILY
Qty: 360 TABLET | Refills: 0 | Status: SHIPPED
Start: 2021-08-13 | End: 2021-08-17

## 2021-08-13 RX ORDER — SPIRONOLACTONE 25 MG/1
25 TABLET ORAL 2 TIMES DAILY
Qty: 180 TABLET | Refills: 1
Start: 2021-08-13 | End: 2021-10-06 | Stop reason: SDUPTHER

## 2021-08-13 NOTE — PROGRESS NOTES
[facility]  Progress Note    Primary Care Doctor:  Arvilla Curling, MD    Chief Complaint   Patient presents with    Follow-up    Congestive Heart Failure        History of Present Illness:  77 y.o. female with history of CAD/CABG in , PAF with maze 2018, HTN, HLD, DVT/PE on coumadin, 2 CVs unsuccessful  -3/10/21 for small bowel pneumatosis with loculated pneumoperitoneum (IV zoysn and TPN), acute on chronic sHF (torsemide decreased at discharge, aldactone was held and coreg dose decreased due to hypotension), TORIBIO on CKD, PAF  ICD placed 2021    I had the pleasure of seeing Naila Cardozo in follow up for sHF. She is in a wheel chair with Taylor Matute (). She is feeling the best in a long time. She went to see Dr Ely Hammond for her back and he recommended changes in her pain medication. She had an ICD placed in July. She denies any increased in weight at home staying 198-200. She denies any increased shortness of breath, palpitations, lightheadedness or edema. Past Medical History:   has a past medical history of Asthma, Atrial fibrillation ([address] Eosinophilia, Hemoptysis, HIGH CHOLESTEROL, Hx of blood clots, Hypertension, Irregular heart beat, Other specified gastritis without mention of hemorrhage, Palpitations, Skin cancer, and Type II or unspecified type diabetes mellitus without mention of complication, not stated as uncontrolled. Surgical History:   has a past surgical history that includes Cholecystectomy;  section; Colonoscopy (2017); skin biopsy; bronchoscopy (2016); Coronary artery bypass graft (2018); Mitral valve replacement (2018); transesophageal echocardiogram (2018); Tunneled venous catheter placement (Left, 2018); Cardiac catheterization (2018); Insertable Cardiac Monitor (Left, 2018); Colonoscopy (2014); Hysterectomy; Upper gastrointestinal endoscopy (N/A, 10/10/2018);  Colonoscopy (10/10/2018); Colonoscopy (N/A, 8/7/2020); Pain management procedure (N/A, 12/18/2020); Pain management procedure (Bilateral, 1/18/2021); and Cardiac catheterization (5/2 and 5/3 2021). Social History:   reports that she has never smoked. She has never used smokeless tobacco. She reports that she does not drink alcohol and does not use drugs. Family History:   Family History   Problem Relation Age of Onset    Cancer Father     Asthma Mother     Hypertension Mother     Heart Disease Mother     High Blood Pressure Mother        Home Medications:  Prior to Admission medications    Medication Sig Start Date End Date Taking?  Authorizing Provider   spironolactone (ALDACTONE) 25 MG tablet Take 1 tablet by mouth 2 times daily 8/13/21  Yes SOLEDAD Beckwith   BYETTA 10 MCG PEN 10 MCG/0.04ML injection INJECT 10 MCG (0.04 ML) UNDER THE SKIN TWICE A DAY WITH MEALS 8/3/21  Yes Izzy Fuentes MD   montelukast (SINGULAIR) 10 MG tablet TAKE 1 TABLET NIGHTLY 8/3/21  Yes Izzy Fuentes MD   potassium chloride (KLOR-CON M) 10 MEQ extended release tablet TAKE 1 TABLET DAILY 8/3/21  Yes Izzy Fuentes MD   warfarin (COUMADIN) 5 MG tablet TAKE 1 TABLET DAILY 8/3/21  Yes Izzy Fuentes MD   FREESTYLE LITE strip USE TO TEST FOUR TIMES A DAY 8/2/21  Yes Izzy Fuentes MD   torsemide (DEMADEX) 100 MG tablet Take 0.5 tablets by mouth 2 times daily 50 mg in am and 40 mg in pm, ok to take 50 mg in pm if weight up 3 pounds 7/14/21  Yes SOLEDAD Jeong   amiodarone (CORDARONE) 200 MG tablet Take 1 tablet by mouth daily 7/7/21  Yes Randal Armstrong MD   Insulin Pen Needle (BD PEN NEEDLE JANNET U/F) 32G X 4 MM MISC USE 1 PEN NEEDLE FOUR TIMES A DAY 6/14/21  Yes Izzy Fuentes MD   midodrine (PROAMATINE) 2.5 MG tablet Take 1 tablet by mouth 2 times daily 6/9/21  Yes SOLEDAD Jeong   metoprolol succinate (TOPROL XL) 50 MG extended release tablet Take 1 tablet by mouth daily 5/24/21  Yes Keiko Zuniga Mike Driscoll MD   losartan (COZAAR) 25 MG tablet Take 1 tablet by mouth daily 5/24/21  Yes Ashly Frank MD   magnesium oxide (MAG-OX) 400 MG tablet Take 1 tablet by mouth daily 5/10/21  Yes Arlyn Engle MD   insulin glargine (LANTUS SOLOSTAR) 100 UNIT/ML injection pen INJECT 26 UNITS IN THE MORNING AND 18 UNITS AT BEDTIME  Patient taking differently: INJECT 26 UNITS IN THE MORNING AND 18 UNITS AT BEDTIME (evening when needed) 3/10/21  Yes Rosana Nolasco MD   ACETAMINOPHEN PO Take 500 mg by mouth every 6 hours as needed    Yes Historical Provider, MD   albuterol sulfate  (90 Base) MCG/ACT inhaler USE 2 INHALATIONS EVERY 6 HOURS AS NEEDED FOR WHEEZING 11/17/20  Yes Ashly Frank MD   albuterol (PROVENTIL) (2.5 MG/3ML) 0.083% nebulizer solution Take 3 mLs by nebulization every 6 hours as needed for Wheezing 6/2/20  Yes Ashly Frank MD   polyethylene glycol (GLYCOLAX) 17 GM/SCOOP powder Take 17 g by mouth daily    Yes Historical Provider, MD   omeprazole (PRILOSEC) 20 MG delayed release capsule Take 20 mg by mouth daily   Yes Historical Provider, MD   FreeStyle Lancets MISC 1 each by Does not apply route 4 times daily 4/29/20  Yes Ashly Frank MD   ondansetron (ZOFRAN) 4 MG tablet Take 1 tablet by mouth 3 times daily as needed for Nausea or Vomiting 3/26/20  Yes Ashly Frank MD   Blood Glucose Monitoring Suppl (EMBRACE PRO GLUCOSE METER) GAETANO 1 Device by Does not apply route 4 times daily 2/4/20  Yes Ashly Frank MD   HUMALOG KWIKPEN 100 UNIT/ML SOPN TAKE AS INSTRUCTED BY YOUR PRESCRIBER  Patient taking differently:  Only if high blood sugar-has not been using 12/30/19  Yes Ashly Frank MD   nystatin (MYCOSTATIN) 667280 UNIT/ML suspension TAKE ONE TEASPOONFUL BY MOUTH FOUR TIMES A DAY FOR 5 DAYS 11/20/19  Yes Ashly Frank MD   aspirin 81 MG chewable tablet Take 1 tablet by mouth daily 6/7/19  Yes Ashly Frank MD   vitamin B-12 500 MCG tablet Take 1 tablet by mouth daily 10/12/18  Yes Theo Carrero MD   ciclopirox (LOPROX) 0.77 % cream Apply topically 2 times daily. Patient not taking: Reported on 8/13/2021 6/14/21   Carine Fernandez MD        Allergies:  Uftjrmrb-lamlrxb-vkgyey [fluocinolone], Ciprofloxacin, Diovan [valsartan], Flagyl [metronidazole], Metformin and related [metformin and related], Benazepril, Morphine, Saxagliptin, and Levaquin [levofloxacin]     Review of Systems:   · Constitutional: there has been no unanticipated weight loss. There's been no change in energy level, sleep pattern, or activity level. · Eyes: No visual changes or diplopia. No scleral icterus. · ENT: No Headaches, hearing loss or vertigo. No mouth sores or sore throat. · Cardiovascular: Reviewed in HPI  · Respiratory: No cough or wheezing, no sputum production. No hematemesis. · Gastrointestinal: No abdominal pain, appetite loss, blood in stools. No change in bowel or bladder habits. · Genitourinary: No dysuria, trouble voiding, or hematuria. · Musculoskeletal:  No gait disturbance, weakness or joint complaints. · Integumentary: No rash or pruritis. · Neurological: No headache, diplopia, change in muscle strength, numbness or tingling. No change in gait, balance, coordination, mood, affect, memory, mentation, behavior. · Psychiatric: No anxiety, no depression. · Endocrine: No malaise, fatigue or temperature intolerance. No excessive thirst, fluid intake, or urination. No tremor. · Hematologic/Lymphatic: No abnormal bruising or bleeding, blood clots or swollen lymph nodes. · Allergic/Immunologic: No nasal congestion or hives.     Physical Examination:    Vitals:    08/13/21 0846   BP: 110/66   Pulse: 67   SpO2: 97%   Weight: 198 lb 14.4 oz (90.2 kg)   Height: 5' 8\" (1.727 m)        Constitutional and General Appearance: Warm and dry, no apparent distress, normal coloration  HEENT:  Normocephalic, atraumatic  Respiratory:  · Normal excursion and expansion without use of accessory muscles  · Resp Auscultation: Normal breath sounds without dullness  Cardiovascular:  · The apical impulses not displaced  · Heart tones are crisp and normal  · JVP normal cm H2O  · regular rate and rhythm  · Peripheral pulses are symmetrical and full  · There is no clubbing, cyanosis of the extremities.   · No ankle edema  · Pedal Pulses: 2+ and equal   Abdomen: distended + bowel sounds  · No masses or tenderness  · Liver/Spleen: No Abnormalities Noted  Neurological/Psychiatric:  · Alert and oriented in all spheres  · Moves all extremities well  · Exhibits normal gait balance and coordination  · No abnormalities of mood, affect, memory, mentation, or behavior are noted    Lab Data:    CBC:   Lab Results   Component Value Date    WBC 6.4 07/07/2021    WBC 4.7 05/07/2021    WBC 6.7 05/05/2021    RBC 4.08 07/07/2021    RBC 3.69 05/07/2021    RBC 3.70 05/05/2021    HGB 11.6 07/07/2021    HGB 10.4 05/10/2021    HGB 10.1 05/07/2021    HCT 35.3 07/07/2021    HCT 32.9 05/10/2021    HCT 31.1 05/07/2021    MCV 86.6 07/07/2021    MCV 84.3 05/07/2021    MCV 84.5 05/05/2021    RDW 18.9 07/07/2021    RDW 17.5 05/07/2021    RDW 17.3 05/05/2021     07/07/2021     05/07/2021     05/05/2021     BMP:  Lab Results   Component Value Date     07/07/2021     06/01/2021     05/24/2021    K 4.3 07/07/2021    K 3.9 06/01/2021    K 3.9 05/24/2021    K 3.5 04/21/2021    K 3.7 04/20/2021    K 4.0 04/19/2021     07/07/2021     06/01/2021     05/24/2021    CO2 27 07/07/2021    CO2 25 06/01/2021    CO2 25 05/24/2021    PHOS 4.0 05/05/2021    PHOS 3.7 05/04/2021    PHOS 3.6 05/03/2021    BUN 29 07/07/2021    BUN 31 06/01/2021    BUN 29 05/24/2021    CREATININE 1.6 07/07/2021    CREATININE 1.5 06/01/2021    CREATININE 1.4 05/24/2021     BNP:   Lab Results   Component Value Date    PROBNP 2,922 06/01/2021    PROBNP 4,032 05/24/2021    PROBNP 3,180 05/10/2021     Cardiac Imaging:  [facility] Preliminary 5/4/2021 Dr Nga Gipson  AV - area ~ 1.5, opens adequately, not severe aortic stenosis     [facility] 5/3/2021 Dr Nga Gipson  Artery Findings/Result   LM Normal   LAD 50% mid, 70% mid, competitive flow distal from LIMA   Cx OM1 20% ostial to prox, superior branch mid OM1 50% ostial   RI N/A   S-D-RPL patent   L-LAD patent   RCA 50-60% distal, very heavily calcified   LVEDP 15   AV Peak to peak gradient 36mmHg, valve crossed easily with pigtail. LVG NA      Intervention:         None     Post Cath Dx:       Patent grafts     Echo 4/20/21  Summary   Overall left ventricular systolic function is severely depressed .   Ejection fraction is visually estimated to be 10-15 %.  E/e'= 23.2 GLS=-3.4   Moderately dilated left ventricle.   There is severe diffuse hypokinesis.   Indeterminate diastolic function.   A bioprosthetic mitral valve is well seated with peak velocity of 1.59m/s   and a mean gradient of 3mmHg.   The left atrium is moderately dilated.   Mild aortic stenosis with a peak velocity of 2.5m/s and a mean pressure   gradient of 14mmHg.   The aortic valve is thickened/calcified with decreased leaflet mobility   consistent with aortic stenosis.   Mild to moderate tricuspid regurgitation.   Estimated pulmonary artery systolic pressure is mildly to moderately   elevated at 46 mmHg assuming a right atrial pressure of 8 mmHg    11/30/2020 Dobutamine Echo   Dobutamine echocardiogram for aortic stenosis.   Very technically limited exam due to atrial fibrillation.   Baseline echocardiogram shows severe left ventricular dysfunction with   anterior, lateral and apical hypokinesis with an ejection fraction of 20-25%.   There is no improvement in wall motion with low or high dose dobutamine   suggestive of nonviable myocardium.      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 After dobutamine stress the mean AV   gradient rises to 20mmHg with 20mcg of dobutamine consistent with mild to   moderate aortic stenosis. Prosthetic mitral valve with a mean gradient of 7mmHg        [facility] 10/21/2020  Mildly dilated left ventricular size and normal wall thickness. Global left ventricular function is severely decreased with ejection fraction estimated at 25%. Patient is in atrial fibrillation during procedure.      The bioprosthetic mitral valve is well seated with a mean gradient of 5mmHg and maximum pressure gradient of 15 mmHg with heart rate of 89 bpm. Pressure half time of 82 ms. There is trivial mitral regurgitation.      Left atrial enlargement. Spontaneous echo contrast seen in the left atrium. A large stump of the left atrial appendage with no thrombus noted. Patient has history of surgical ligation of the left atrial appendage. There are spontaneous echo contrast in the atrial appendage.      The aortic valve is thickened/calcified with decreased leaflet mobility consistent with aortic stenosis. There is trivial aortic insufficiency.      There is moderate tricuspid regurgitation.     ECHO 9/15/20  Left ventricular cavity size is dilated. There is normal wall thickness with a moderately severe reduction in   systolic function.   LV ejection fraction is visually estimated to be 25-30%.   Indeterminate diastolic function.   The right ventricle is not well visualized but appears to be normal in size with moderately reduced function.   The left atrium is moderately dilated.   A bioprosthetic mitral valve with a mean gradient of 7 mmHg. This may be a normal gradient for this valve but could suggest mild stenosis.   Trivial mitral regurgitation.   The aortic valve is thickened/calcified with a mean gradient of 16mmHg consistent with at least mild aortic stenosis.  This is likely underestimated due to low cardiac output secondary to LV dysfunction.   No significant aortic valve regurgitation.   Moderate tricuspid regurgitation with RVSP of 48 mmHg.     [facility] 11/1/2019  Normal left ventricular cavity size and wall thickness. Global left ventricular function is moderate-to-severely decreased with ejection fraction estimated from 35% to 40%. Severe apical akinesis noted.      The bioprosthetic mitral valve is well seated with a mean gradient of 2mmHg and maximum pressure gradient of 5 mmHg. There is trivial mitral regurgitation.      Left atrial enlargement. Spontaneous echo contrast seen in the left atrium.   Stump of the left atrial appendage noted with no thrombus.      The aortic valve is thickened/calcified with decreased leaflet mobility consistent with aortic stenosis. There is trivial aortic insufficiency    Assessment:    1. Chronic systolic heart failure due to valvular disease (HCC) on arb and aldosterone antagonist   2. PAF (paroxysmal atrial fibrillation) (HCC) on coumadin in nsr   3. S/P CABG x 3    4. Nonrheumatic aortic valve stenosis    5. Other chronic back pain          Plan:   Patient Instructions   1. Check blood work including magnesium  2. Continue all current medications  3.   RTO in 4 months or sooner      I appreciate the opportunity of cooperating in the care of this individual.    SOLEDAD Gomez - CNS, CNS, 8/13/2021, 11:04 AM

## 2021-08-13 NOTE — PATIENT INSTRUCTIONS
1. Check blood work including magnesium  2. Continue all current medications  3.   RTO in 4 months or sooner

## 2021-08-13 NOTE — TELEPHONE ENCOUNTER
----- Message from SOLEDAD Lopez - CNS sent at 8/13/2021  3:51 PM EDT -----  Creat is up to 2.2   We need to cut back on her torsemide down a little  She is taking 50 mg twice a day, lets change it to 40 mg bid  Watch her weight and recheck labs in 2 weeks  If weight goes up they should call, 3 pounds in a day or 5 in a week  Make sure they have 20 mg torsemide to do this change  thanks

## 2021-08-14 LAB
ESTIMATED AVERAGE GLUCOSE: 122.6 MG/DL
HBA1C MFR BLD: 5.9 %

## 2021-08-17 ENCOUNTER — TELEPHONE (OUTPATIENT)
Dept: FAMILY MEDICINE CLINIC | Age: 76
End: 2021-08-17

## 2021-08-17 ENCOUNTER — OFFICE VISIT (OUTPATIENT)
Dept: ORTHOPEDIC SURGERY | Age: 76
End: 2021-08-17
Payer: MEDICARE

## 2021-08-17 ENCOUNTER — OFFICE VISIT (OUTPATIENT)
Dept: FAMILY MEDICINE CLINIC | Age: 76
End: 2021-08-17
Payer: MEDICARE

## 2021-08-17 VITALS — HEIGHT: 68 IN | WEIGHT: 201 LBS | BODY MASS INDEX: 30.46 KG/M2

## 2021-08-17 VITALS
OXYGEN SATURATION: 97 % | BODY MASS INDEX: 30.56 KG/M2 | HEART RATE: 57 BPM | SYSTOLIC BLOOD PRESSURE: 100 MMHG | TEMPERATURE: 97.3 F | DIASTOLIC BLOOD PRESSURE: 70 MMHG | WEIGHT: 201 LBS

## 2021-08-17 DIAGNOSIS — Z95.810 ICD (IMPLANTABLE CARDIOVERTER-DEFIBRILLATOR), DUAL, IN SITU: ICD-10-CM

## 2021-08-17 DIAGNOSIS — M15.9 GENERALIZED OSTEOARTHROSIS, INVOLVING MULTIPLE SITES: ICD-10-CM

## 2021-08-17 DIAGNOSIS — E11.22 TYPE 2 DIABETES MELLITUS WITH STAGE 3B CHRONIC KIDNEY DISEASE, WITH LONG-TERM CURRENT USE OF INSULIN (HCC): ICD-10-CM

## 2021-08-17 DIAGNOSIS — N18.32 TYPE 2 DIABETES MELLITUS WITH STAGE 3B CHRONIC KIDNEY DISEASE, WITH LONG-TERM CURRENT USE OF INSULIN (HCC): ICD-10-CM

## 2021-08-17 DIAGNOSIS — Z79.4 TYPE 2 DIABETES MELLITUS WITH STAGE 3B CHRONIC KIDNEY DISEASE, WITH LONG-TERM CURRENT USE OF INSULIN (HCC): ICD-10-CM

## 2021-08-17 DIAGNOSIS — E11.65 TYPE 2 DIABETES MELLITUS WITH HYPERGLYCEMIA, WITH LONG-TERM CURRENT USE OF INSULIN (HCC): Primary | ICD-10-CM

## 2021-08-17 DIAGNOSIS — K55.1 ATHEROSCLEROSIS OF SUPERIOR MESENTERIC ARTERY (HCC): ICD-10-CM

## 2021-08-17 DIAGNOSIS — Z79.4 TYPE 2 DIABETES MELLITUS WITH HYPERGLYCEMIA, WITH LONG-TERM CURRENT USE OF INSULIN (HCC): Primary | ICD-10-CM

## 2021-08-17 DIAGNOSIS — N17.9 ACUTE RENAL FAILURE SUPERIMPOSED ON STAGE 3 CHRONIC KIDNEY DISEASE, UNSPECIFIED ACUTE RENAL FAILURE TYPE, UNSPECIFIED WHETHER STAGE 3A OR 3B CKD (HCC): ICD-10-CM

## 2021-08-17 DIAGNOSIS — N18.30 ACUTE RENAL FAILURE SUPERIMPOSED ON STAGE 3 CHRONIC KIDNEY DISEASE, UNSPECIFIED ACUTE RENAL FAILURE TYPE, UNSPECIFIED WHETHER STAGE 3A OR 3B CKD (HCC): ICD-10-CM

## 2021-08-17 DIAGNOSIS — I25.5 ISCHEMIC CARDIOMYOPATHY: ICD-10-CM

## 2021-08-17 DIAGNOSIS — M17.0 BILATERAL PRIMARY OSTEOARTHRITIS OF KNEE: Primary | ICD-10-CM

## 2021-08-17 PROCEDURE — 20610 DRAIN/INJ JOINT/BURSA W/O US: CPT | Performed by: ORTHOPAEDIC SURGERY

## 2021-08-17 PROCEDURE — 99214 OFFICE O/P EST MOD 30 MIN: CPT | Performed by: FAMILY MEDICINE

## 2021-08-17 RX ORDER — INSULIN GLARGINE 100 [IU]/ML
INJECTION, SOLUTION SUBCUTANEOUS
Qty: 90 ML | Refills: 1 | Status: SHIPPED | OUTPATIENT
Start: 2021-08-17 | End: 2021-11-22 | Stop reason: DRUGHIGH

## 2021-08-17 RX ORDER — OXYCODONE HYDROCHLORIDE 5 MG/1
0.5 TABLET ORAL DAILY
COMMUNITY

## 2021-08-17 RX ORDER — BLOOD-GLUCOSE METER
1 KIT MISCELLANEOUS 4 TIMES DAILY
COMMUNITY

## 2021-08-17 RX ORDER — INSULIN LISPRO 100 [IU]/ML
INJECTION, SOLUTION INTRAVENOUS; SUBCUTANEOUS
Qty: 15 ML | Refills: 0
Start: 2021-08-17 | End: 2021-11-22 | Stop reason: SDUPTHER

## 2021-08-17 RX ORDER — HYALURONATE SODIUM 10 MG/ML
20 SYRINGE (ML) INTRAARTICULAR ONCE
Status: COMPLETED | OUTPATIENT
Start: 2021-08-17 | End: 2021-08-17

## 2021-08-17 RX ORDER — TORSEMIDE 20 MG/1
40 TABLET ORAL 2 TIMES DAILY
COMMUNITY
End: 2021-09-07 | Stop reason: SDUPTHER

## 2021-08-17 RX ADMIN — Medication 20 MG: at 13:04

## 2021-08-17 ASSESSMENT — PATIENT HEALTH QUESTIONNAIRE - PHQ9
SUM OF ALL RESPONSES TO PHQ QUESTIONS 1-9: 0
1. LITTLE INTEREST OR PLEASURE IN DOING THINGS: 0
SUM OF ALL RESPONSES TO PHQ QUESTIONS 1-9: 0
2. FEELING DOWN, DEPRESSED OR HOPELESS: 0
SUM OF ALL RESPONSES TO PHQ9 QUESTIONS 1 & 2: 0
SUM OF ALL RESPONSES TO PHQ QUESTIONS 1-9: 0

## 2021-08-17 NOTE — TELEPHONE ENCOUNTER
Patient advised. She is concerned because if she stops this she will only be on Lantus 22 units in the morning .

## 2021-08-17 NOTE — PROGRESS NOTES
Subjective:      Patient ID: Domitila Maradiaga is a 77 y.o. female. CC: Patient presents for re-evaluation of chronic health problems including diabetes mellitus, acute kidney failure, superior mesenteric artery atherosclerosis, status post implantable defibrillator, cardiomyopathy and osteoarthritis. Jose Luis Awad \[facility]\"" Patient presents today for a follow-up on chronic medications and medical conditions. Since last appointment time she had recent evaluation with cardiologist and was noted to have acute renal failure. Demadex medication was decreased down from 90 mg daily to 40 mg twice daily. She is also on spironolactone twice a day. She is to monitor blood pressure closely at home and her blood pressure is controlled. When she did have a cortisone injection in her knees her blood sugars became elevated but typically her morning blood sugars between 80-1 20 and she does not feel hypoglycemic unless her blood sugars are less than 70. She is been able to decrease insulin down to 26 units in the morning only. She still having abdominal symptoms but has not had any more severe symptoms. She feels her bowels are working normal for her. She is eating healthy. She denies any chest pain. She did have an evaluation with pain management specialist and was told that further injections and epidurals would not help her back but did recommend she take a half a pain pill in the morning which she has been doing and it seems to improve her arthritis symptoms significantly. Eye exam current (within one year): Yes    Checks sugars at home: yes  Home blood sugar records: patient tests 4 time(s) per day  Any episodes of hypoglycemia?  Yes    Current medication use: taking as prescribed  Medication side effects: none     Current diet: well balanced, on average, 3 meals per day with some snacks  Current exercise:walking around the house     Review of Systems     Patient Active Problem List   Diagnosis    Long term current use of anticoagulant    Hypercholesteremia    Generalized osteoarthrosis, involving multiple sites    Eosinophilic gastritis    Paroxysmal SVT (supraventricular tachycardia) ([facility])    B12 deficiency    History of pulmonary embolism    Multiple pulmonary nodules    Type 2 diabetes mellitus with hyperglycemia, with long-term current use of insulin ([facility])    Asthma    PAF [facility]oxysmal atrial fibrillation) [facility] [address]    Hypertension    Coronary artery disease due to lipid rich plaque    Nonrheumatic mitral valve regurgitation    S/P CABG x 3    Goiter    Primary osteoarthritis of both knees    Splenic infarct    Obesity, Class I, BMI 30-34.9    Chronic systolic CHF (congestive heart failure), NYHA class 3 ([facility])    Thoracic degenerative disc disease    Persistent atrial fibrillation ([facility])    S/P MVR (mitral valve repair)    Ischemic cardiomyopathy    Occlusion and stenosis of bilateral carotid arteries    Pneumatosis intestinalis of small intestine    Aortic valve stenosis    Deep vein thrombosis (DVT) of non-extremity vein    Acute on chronic systolic heart failure due to valvular disease ([facility])    Stage 3 chronic kidney disease ([facility])    Hypotension    Dyspnea on exertion    Acute on chronic systolic CHF (congestive heart failure) ([facility])    Chronic diastolic heart failure ([facility])    Atherosclerosis of superior mesenteric artery ([facility] [address]    ICD (implantable cardioverter-defibrillator), dual, in situ       Outpatient Medications Marked as Taking for the 8/17/21 encounter (Office Visit) with Opal Mata MD   Medication Sig Dispense Refill    torsemide (DEMADEX) 20 MG tablet Take 40 mg by mouth 2 times daily      Blood Glucose Monitoring Suppl (FREESTYLE LITE) GAETANO 1 Device by Does not apply route 4 times daily      oxyCODONE (ROXICODONE) 5 MG immediate release tablet Take 0.5 mg by mouth daily.       spironolactone (ALDACTONE) 25 MG tablet Take 1 tablet by mouth 2 times daily 180 tablet 1    BYETTA INSTRUCTED BY YOUR PRESCRIBER (Patient taking differently: Only if high blood sugar-has not been using) 15 mL 8    nystatin (MYCOSTATIN) 050519 UNIT/ML suspension TAKE ONE TEASPOONFUL BY MOUTH FOUR TIMES A DAY FOR 5 DAYS 1 Bottle 2    aspirin 81 MG chewable tablet Take 1 tablet by mouth daily 30 tablet 3    vitamin B-12 500 MCG tablet Take 1 tablet by mouth daily 30 tablet 3       Allergies   Allergen Reactions    Phreunqh-Fiqtvyh-Vntlva [Fluocinolone] Shortness Of Breath    Ciprofloxacin Shortness Of Breath    Diovan [Valsartan] Shortness Of Breath    Flagyl [Metronidazole] Shortness Of Breath     Has taken diflucan at home 12/7/15    Metformin And Related [Metformin And Related] Shortness Of Breath    Benazepril      Other reaction(s): Not Recorded    Morphine      Bad reaction. \"makes her feel horrible\".  Saxagliptin      Other reaction(s): Not Recorded    Levaquin [Levofloxacin] Rash       Social History     Tobacco Use    Smoking status: Never Smoker    Smokeless tobacco: Never Used   Substance Use Topics    Alcohol use: No       /70 (Site: Right Upper Arm, Position: Sitting, Cuff Size: Large Adult)   Pulse 57   Temp 97.3 °F (36.3 °C) (Infrared)   Wt 201 lb (91.2 kg)   SpO2 97%   BMI 30.56 kg/m²       Objective:   Physical Exam  Constitutional:       General: She is not in acute distress. Appearance: She is well-developed. Neck:      Vascular: No carotid bruit. Cardiovascular:      Rate and Rhythm: Normal rate and regular rhythm. Pulses:           Dorsalis pedis pulses are 2+ on the right side and 2+ on the left side. Posterior tibial pulses are 2+ on the right side and 2+ on the left side. Heart sounds: Murmur (Right sternal border) heard. Systolic murmur is present with a grade of 2/6. No friction rub. No gallop. Pulmonary:      Effort: Pulmonary effort is normal.      Breath sounds: Normal breath sounds.    Abdominal:      General: Bowel sounds are increased. There is distension. Palpations: Abdomen is soft. Tenderness: There is abdominal tenderness in the epigastric area. There is no right CVA tenderness or left CVA tenderness. Musculoskeletal:         General: No tenderness. Normal range of motion. Right lower leg: No edema. Left lower leg: No edema. Right foot: Normal.      Left foot: Normal.   Skin:     Findings: No rash. Neurological:      Mental Status: She is alert and oriented to person, place, and time. Sensory: Sensation is intact. Motor: Motor function is intact. Deep Tendon Reflexes: Reflexes are normal and symmetric. Comments: 12 point monofilament test normal    Psychiatric:         Behavior: Behavior is cooperative. Assessment:      Jase Clifford was seen today for follow-up and diabetes. Diagnoses and all orders for this visit:    Type 2 diabetes mellitus with hyperglycemia, with long-term current use of insulin ([facility] [address]  -      DIABETES FOOT EXAM  -     Comprehensive Metabolic Panel; Future  -     Hemoglobin A1C; Future  -     Lipid Panel; Future    Acute renal failure superimposed on stage 3 chronic kidney disease, unspecified acute renal failure type, unspecified whether stage 3a or 3b CKD (HCC)    Atherosclerosis of superior mesenteric artery (HCC)    ICD (implantable cardioverter-defibrillator), dual, in situ    Type 2 diabetes mellitus with stage 3b chronic kidney disease, with long-term current use of insulin ([facility])    Ischemic cardiomyopathy    Generalized osteoarthrosis, involving multiple sites    Other orders  -     insulin glargine (LANTUS SOLOSTAR) 100 UNIT/ML injection pen; INJECT 22 UNITS IN THE MORNING    Discontinue Bylatonya    OARRS report checked          Plan:      Laboratory profile reviewed with patient and . Concerns about acute renal failure superimposed on chronic renal failure. Certainly agreed that the diuretic therapy need to be decreased down.   We also discussed drinking 6 to 8 glasses of fluid a day and maintain a low-salt diet. Decrease insulin therapy as she is does have strict control of her diabetes mellitus. We did discuss that her blood sugars remain low that we may need to decrease her insulin down further between visits. She is can report back her blood sugar readings in next 3 to 4 weeks. Discussed with patient after visit to discontinue Byetta medication as this may exacerbate the renal failure. Certainly is to be followed closely regards to the acute renal failure and cardiologist of already ordered laboratory testing to be done next week. Continue with monitoring protimes closely    RTC 3 months    Medical decision making of moderate complexity. Please note that this chart was generated using Dragon dictation software. Although every effort was made to ensure the accuracy of this automated transcription, some errors in transcription may have occurred.

## 2021-08-17 NOTE — TELEPHONE ENCOUNTER
Humalog - 140-199 take 2 units, 200-249- take 3 units, 250-300- 4 units, >300 take 5 units- can use this 3 times daily prn. Patient advised of all information.

## 2021-08-17 NOTE — PROGRESS NOTES
[place] [address] [facility]  Outpatient Progress Note  Mary Uribe MD    Patient Name: Jose A Lang MRN: [id number]   Age: 77 y.o. YOB: 1945   Sex: female      [address] Complaint   Patient presents with    Injections     Euflexxa inj bilateral knees #2        HISTORY OF PRESENT ILLNESS   Jose A Lang is a 77 y.o. female who returns today for followup for her second Euflexxa injection. Good pain relief was obtained after the prior injection was performed. PROCEDURE   The patient returns today for her second visco injection in her bilateral knee(s). The patient returns today for their second visco injection. The risks, benefits, and complications of the injections were again discussed in detail with the patient. The risks discussed included but are not limited to infection, skin reactions, hot swollen joints, and anaphylaxis. The patient gave verbal informed consent for the injection. The patient skin was prepped with 3 sterile gauze pads soaked with alcohol solution and the knee joint was injected with 2ml Euflexxa intra-articularly under sterile conditions . The patient tolerated the injection reasonably well. The patient was given instructions to ice the the knee and avoid strenuous activities for 24-48 hours. The patient was instructed to call the office immediately if there is increased pain, redness, warmth, fever, or chills. We will see the patient back in one week for the third injection.     Jennie Castano MD The patient is asked the following questions today and these are her answers:    -Have you had a mantoux administered in the past 30 days?    Yes  -Have you had a previous positive Mantoux.  No  -Have you received BCG in the past.  No  -Have you had a live vaccine  (MMR, Varicella, OPV, Yellow Fever) in the last 6 weeks.  No  -Have you had and active  viral or bacterial infection in the past 6 weeks.  No  -Have you received corticosteroids or immunosuppressive agents in the past 6 weeks.  No  -Have you been diagnosed with HIV?  No  -Do you have a maglinancy?  No     Patient had second step mantoux administered in her left forearm at 1341. She will come back on Friday 8/24/2018 after 1341 to have this read.     Amparo Espitia

## 2021-08-17 NOTE — TELEPHONE ENCOUNTER
That is true that she will only be taking insulin and she can monitor her blood sugars and report back how her blood sugars are doing in the next 2 to 3 weeks.

## 2021-08-24 ENCOUNTER — OFFICE VISIT (OUTPATIENT)
Dept: ORTHOPEDIC SURGERY | Age: 76
End: 2021-08-24
Payer: MEDICARE

## 2021-08-24 VITALS — BODY MASS INDEX: 30.46 KG/M2 | WEIGHT: 201 LBS | HEIGHT: 68 IN

## 2021-08-24 DIAGNOSIS — M17.0 BILATERAL PRIMARY OSTEOARTHRITIS OF KNEE: Primary | ICD-10-CM

## 2021-08-24 PROCEDURE — 20610 DRAIN/INJ JOINT/BURSA W/O US: CPT | Performed by: ORTHOPAEDIC SURGERY

## 2021-08-24 RX ORDER — HYALURONATE SODIUM 10 MG/ML
20 SYRINGE (ML) INTRAARTICULAR ONCE
Status: COMPLETED | OUTPATIENT
Start: 2021-08-24 | End: 2021-08-24

## 2021-08-24 RX ADMIN — Medication 20 MG: at 12:55

## 2021-08-24 NOTE — PATIENT INSTRUCTIONS
Joint Injection After [address] [facility]  Merissa Cardona MD    Refer to this sheet in the next few days. These insructions provide you with information on caring for yourself after you have had a joint injection. Your caregiver also may give you more specific instructions. Your treatment has been planned according to current medical practices, but problems sometimes occur. Call your caregiver if you have any problems or questions after your procedure. After any type of joint injection, it is not uncommon to experience:     A temporary increase in pain which should resolve within 2-3 days   Soreness, swelling, or bruising around the injection site   Mild numbness, tingling, or weakness around the injection site caused by the numbing medicine used before or with the injection    It is also possible to experience the following effects associated with the specific agent after injection:     Corticosteroids (these effects are rare):  o Allergic reaction  o Increased blood sugar levels (if you have diabetes and you notice that your blood sugar levels have increased, notify your caregiver)  o Increased blood pressure levels  o Mood swings   Hyaluronic acid in the use of viscosupplementation  o Temporary heat or redness  o Temporary rash and itching  o Increased fluid accumulation in the injected join    These effects all should resolve within a day or two after your procedure. HOME CARE INSTRUCTIONS     Limit yourself to light activity the day of your procedure. Avoid lifting heavy objections, bending, stooping or twisting   Take prescription or over-the-counter pain medication as directed by your caregiver   You may apply ice to your injection site to reduce pain and swelling the day of your procedure.  Ice may be applied 3-4 times:  o Put ice in a plastic bag  o Place a towel between your skin and the bag  o Leave the ice on for no longer than 15-20 minutes each time    FOLLOW-UP INSTRUCTIONS    Please make sure you keep your follow-up appointment as indicated by your physician.

## 2021-08-24 NOTE — PROGRESS NOTES
[address] [facility]  Outpatient Progress Note  [facility] FOSTER Valdez MD    Patient Name: Roger Carranza MRN: [id number]   Age: 77 y.o. YOB: 1945   Sex: female      [address] Complaint   Patient presents with    Follow-up     BIlateral Euflexxa inj #3 today        HISTORY OF PRESENT ILLNESS   Roger Carranza is a 77 y.o. female who returns today for followup for her third Euflexxa injection. Good pain relief was obtained after the prior injection was performed. PROCEDURE   The patient returns today for her third and final visco injection in her bilateral knee(s). The risks, benefits, and complications of the injections were again discussed in detail with the patient. The risks discussed included but are not limited to infection, skin reactions, hot swollen joints, and anaphylaxis. The patient gave verbal informed consent for the injection. The patient skin was prepped with 3 sterile gauze pads soaked with alcohol solution and the knee joint was injected with 2ml Euflexxa intra-articularly under sterile conditions . The patient tolerated the injection reasonably well. The patient was given instructions to ice the the knee and avoid strenuous activities for 24-48 hours. The patient was instructed to call the office immediately if there is increased pain, redness, warmth, fever, or chills. We will see the patient back in one week for the third injection. If this was the patient's first series of injections, she will follow up with me in 4-6 weeks as needed. If multiple series have been done in the past, she will follow up when symptoms return.      Karen Foster MD

## 2021-08-27 ENCOUNTER — HOSPITAL ENCOUNTER (OUTPATIENT)
Age: 76
Discharge: HOME | End: 2021-08-27
Payer: MEDICARE

## 2021-08-27 ENCOUNTER — ANTI-COAG VISIT (OUTPATIENT)
Dept: FAMILY MEDICINE CLINIC | Age: 76
End: 2021-08-27

## 2021-08-27 ENCOUNTER — TELEPHONE (OUTPATIENT)
Dept: CARDIOLOGY CLINIC | Age: 76
End: 2021-08-27

## 2021-08-27 DIAGNOSIS — I50.22 CHRONIC SYSTOLIC CHF (CONGESTIVE HEART FAILURE), NYHA CLASS 3 (HCC): ICD-10-CM

## 2021-08-27 LAB
ANION GAP SERPL CALCULATED.3IONS-SCNC: 12 MMOL/L (ref 3–16)
BUN BLDV-MCNC: 38 MG/DL (ref 7–20)
CALCIUM SERPL-MCNC: 8.8 MG/DL (ref 8.3–10.6)
CHLORIDE BLD-SCNC: 102 MMOL/L (ref 99–110)
CO2: 27 MMOL/L (ref 21–32)
CREAT SERPL-MCNC: 1.8 MG/DL (ref 0.6–1.2)
GFR AFRICAN AMERICAN: 33
GFR NON-AFRICAN AMERICAN: 27
GLUCOSE BLD-MCNC: 90 MG/DL (ref 70–99)
INR BLD: 2.7
POTASSIUM SERPL-SCNC: 4.3 MMOL/L (ref 3.5–5.1)
PRO-BNP: 2800 PG/ML (ref 0–449)
SODIUM BLD-SCNC: 141 MMOL/L (ref 136–145)

## 2021-08-27 PROCEDURE — 36415 COLL VENOUS BLD VENIPUNCTURE: CPT

## 2021-08-27 PROCEDURE — 83880 ASSAY OF NATRIURETIC PEPTIDE: CPT

## 2021-08-27 PROCEDURE — 80048 BASIC METABOLIC PNL TOTAL CA: CPT

## 2021-08-27 NOTE — TELEPHONE ENCOUNTER
----- Message from SOLEDAD Mays CNP sent at 8/27/2021  4:32 PM EDT -----  Kidney fx better, no change in meds.  Edil Najera

## 2021-09-07 DIAGNOSIS — E11.69 DIABETES MELLITUS TYPE 2 IN OBESE (HCC): ICD-10-CM

## 2021-09-07 DIAGNOSIS — E66.9 DIABETES MELLITUS TYPE 2 IN OBESE (HCC): ICD-10-CM

## 2021-09-07 DIAGNOSIS — M17.0 PRIMARY OSTEOARTHRITIS OF BOTH KNEES: ICD-10-CM

## 2021-09-07 RX ORDER — LANCETS 28 GAUGE
1 EACH MISCELLANEOUS 4 TIMES DAILY
Qty: 200 EACH | Refills: 5 | Status: SHIPPED | OUTPATIENT
Start: 2021-09-07

## 2021-09-07 RX ORDER — TORSEMIDE 20 MG/1
40 TABLET ORAL 2 TIMES DAILY
Qty: 60 TABLET | Refills: 2 | Status: SHIPPED | OUTPATIENT
Start: 2021-09-07 | End: 2021-09-20 | Stop reason: SDUPTHER

## 2021-09-07 RX ORDER — OXYCODONE HYDROCHLORIDE 5 MG/1
5 TABLET ORAL 3 TIMES DAILY PRN
Qty: 90 TABLET | Refills: 0 | Status: SHIPPED | OUTPATIENT
Start: 2021-09-07 | End: 2021-11-22 | Stop reason: SDUPTHER

## 2021-09-07 NOTE — TELEPHONE ENCOUNTER
Medication:   Requested Prescriptions     Pending Prescriptions Disp Refills    oxyCODONE (ROXICODONE) 5 MG immediate release tablet 90 tablet 0     Sig: Take 1 tablet by mouth 3 times daily as needed for Pain for up to 30 days.  FreeStyle Lancets MISC 200 each 5     Si each by Does not apply route 4 times daily    torsemide (DEMADEX) 20 MG tablet 60 tablet 2     Sig: Take 2 tablets by mouth 2 times daily      Last Filled:  21    Patient Phone Number: [phone] (home)     Last appt: 2021   Next appt: 2021    Last OARRS:   RX Monitoring 3/1/2019   Attestation The Prescription Monitoring Report for this patient was reviewed today.    Periodic Controlled Substance Monitoring -     PDMP Monitoring:    Last PDMP Edward Davies as Reviewed [facility]):  Review User Review Instant Review Result   Cafe Second 2021  8:33 AM Reviewed PDMP [1]     Preferred Pharmacy:   [facility] , [address] [phone] - F [phone]  [facility] [address]  Phone: [phone] Fax: [phone]    [address] [place] [address] [address], [address] [phone] [facility] [phone] [address] [facility] [address]  [place] [address]  Phone: [phone] Fax: [phone]

## 2021-09-07 NOTE — TELEPHONE ENCOUNTER
PT is requesting refills on **oxyCODONE (ROXICODONE) 5 MG immediate release tablet    **FreeStyle Lancets MISC   To be sent to [facility] [address], [address] [phone]         The other is torsemide (DEMADEX) 20 MG tablet to please be sent to [address], [address] [facility]

## 2021-09-09 ENCOUNTER — NURSE ONLY (OUTPATIENT)
Dept: CARDIOLOGY CLINIC | Age: 76
End: 2021-09-09
Payer: MEDICARE

## 2021-09-09 ENCOUNTER — NURSE ONLY (OUTPATIENT)
Dept: CARDIOLOGY CLINIC | Age: 76
End: 2021-09-09

## 2021-09-09 DIAGNOSIS — I50.22 CHRONIC SYSTOLIC CHF (CONGESTIVE HEART FAILURE), NYHA CLASS 3 (HCC): ICD-10-CM

## 2021-09-09 DIAGNOSIS — Z95.810 ICD (IMPLANTABLE CARDIOVERTER-DEFIBRILLATOR), DUAL, IN SITU: ICD-10-CM

## 2021-09-09 DIAGNOSIS — I25.5 ISCHEMIC CARDIOMYOPATHY: ICD-10-CM

## 2021-09-09 PROCEDURE — G2066 INTER DEVC REMOTE 30D: HCPCS | Performed by: CLINICAL NURSE SPECIALIST

## 2021-09-09 PROCEDURE — 93297 REM INTERROG DEV EVAL ICPMS: CPT | Performed by: CLINICAL NURSE SPECIALIST

## 2021-09-09 NOTE — PROGRESS NOTES
We received remote transmission from patient's dual chamber ICD monitor at home. Transmission shows normal sensing and pacing function. No new arrhythmias/events recorded. Optivol is within normal range. NP will review. See interrogation under cardiology tab in the [address] [place] [address] [address] field for more details.

## 2021-09-17 ENCOUNTER — ANTI-COAG VISIT (OUTPATIENT)
Dept: FAMILY MEDICINE CLINIC | Age: 76
End: 2021-09-17

## 2021-09-17 LAB — INR BLD: 2.8

## 2021-09-20 RX ORDER — TORSEMIDE 20 MG/1
40 TABLET ORAL 2 TIMES DAILY
Qty: 360 TABLET | Refills: 1 | Status: SHIPPED | OUTPATIENT
Start: 2021-09-20 | End: 2022-03-07

## 2021-09-20 NOTE — TELEPHONE ENCOUNTER
PT's  has a concern about the pt's medication torsemide (DEMADEX) 20 MG tablet pt takes 4 pills a day and the refill is only for 15 days worth. Pt doesn't have enough to get her thru the month Please advise? ?

## 2021-09-20 NOTE — TELEPHONE ENCOUNTER
Please send a new script to the pharmacy at [facility] . Advised patient and  we can send a script with the correct amount.

## 2021-10-01 ENCOUNTER — ANTI-COAG VISIT (OUTPATIENT)
Dept: FAMILY MEDICINE CLINIC | Age: 76
End: 2021-10-01

## 2021-10-01 LAB — INR BLD: 3.1

## 2021-10-04 ENCOUNTER — TELEPHONE (OUTPATIENT)
Dept: CARDIOLOGY CLINIC | Age: 76
End: 2021-10-04

## 2021-10-04 DIAGNOSIS — R53.83 FATIGUE, UNSPECIFIED TYPE: ICD-10-CM

## 2021-10-04 DIAGNOSIS — I50.22 CHRONIC SYSTOLIC CONGESTIVE HEART FAILURE (HCC): ICD-10-CM

## 2021-10-04 DIAGNOSIS — E55.9 VITAMIN D DEFICIENCY: Primary | ICD-10-CM

## 2021-10-04 NOTE — TELEPHONE ENCOUNTER
Talked with patient and she states her weight was 204.6 today  202.6 Sun  203.4-Sat  202.4 Fri  199. 6-Thu  202.2- Wed    No SOB or Edema noted at this time. Please advise

## 2021-10-04 NOTE — TELEPHONE ENCOUNTER
Spoke with the patient and advised her of the message below per NPRG. She admits to taking the Midodrine. Pt also aware to have labs drawn and will be sending transmission .

## 2021-10-04 NOTE — TELEPHONE ENCOUNTER
Pt is still having low BP. Yesterday's reading were 102/64, 125/73, 101/50 and   97/47. Today's is 108/83 and 110/48. Pt is fatigued. Please call to advise.

## 2021-10-04 NOTE — TELEPHONE ENCOUNTER
Lets get some labs done  Also make sure she is taking the midodrine as in a previous note  She can also send a remote optival  thanks

## 2021-10-05 ENCOUNTER — HOSPITAL ENCOUNTER (OUTPATIENT)
Age: 76
Discharge: HOME | End: 2021-10-05
Payer: MEDICARE

## 2021-10-05 ENCOUNTER — TELEPHONE (OUTPATIENT)
Dept: CARDIOLOGY CLINIC | Age: 76
End: 2021-10-05

## 2021-10-05 DIAGNOSIS — R53.83 FATIGUE, UNSPECIFIED TYPE: ICD-10-CM

## 2021-10-05 DIAGNOSIS — E55.9 VITAMIN D DEFICIENCY: ICD-10-CM

## 2021-10-05 DIAGNOSIS — E03.9 HYPOTHYROIDISM, UNSPECIFIED TYPE: Primary | ICD-10-CM

## 2021-10-05 DIAGNOSIS — I50.22 CHRONIC SYSTOLIC CONGESTIVE HEART FAILURE (HCC): ICD-10-CM

## 2021-10-05 LAB
A/G RATIO: 1 (ref 1.1–2.2)
ALBUMIN SERPL-MCNC: 3 G/DL (ref 3.4–5)
ALP BLD-CCNC: 109 U/L (ref 40–129)
ALT SERPL-CCNC: 12 U/L (ref 10–40)
ANION GAP SERPL CALCULATED.3IONS-SCNC: 12 MMOL/L (ref 3–16)
AST SERPL-CCNC: 18 U/L (ref 15–37)
BILIRUB SERPL-MCNC: 0.4 MG/DL (ref 0–1)
BUN BLDV-MCNC: 38 MG/DL (ref 7–20)
CALCIUM SERPL-MCNC: 8.6 MG/DL (ref 8.3–10.6)
CHLORIDE BLD-SCNC: 100 MMOL/L (ref 99–110)
CO2: 27 MMOL/L (ref 21–32)
CREAT SERPL-MCNC: 1.7 MG/DL (ref 0.6–1.2)
GFR AFRICAN AMERICAN: 35
GFR NON-AFRICAN AMERICAN: 29
GLOBULIN: 2.9 G/DL
GLUCOSE BLD-MCNC: 159 MG/DL (ref 70–99)
HCT VFR BLD CALC: 36.2 % (ref 36–48)
HEMOGLOBIN: 11.8 G/DL (ref 12–16)
MCH RBC QN AUTO: 29.8 PG (ref 26–34)
MCHC RBC AUTO-ENTMCNC: 32.5 G/DL (ref 31–36)
MCV RBC AUTO: 91.5 FL (ref 80–100)
PDW BLD-RTO: 15.6 % (ref 12.4–15.4)
PLATELET # BLD: 231 K/UL (ref 135–450)
PMV BLD AUTO: 9.7 FL (ref 5–10.5)
POTASSIUM SERPL-SCNC: 4 MMOL/L (ref 3.5–5.1)
PRO-BNP: 2237 PG/ML (ref 0–449)
RBC # BLD: 3.95 M/UL (ref 4–5.2)
SODIUM BLD-SCNC: 139 MMOL/L (ref 136–145)
TOTAL PROTEIN: 5.9 G/DL (ref 6.4–8.2)
TSH SERPL DL<=0.05 MIU/L-ACNC: 7.67 UIU/ML (ref 0.27–4.2)
VITAMIN D 25-HYDROXY: 14.8 NG/ML
WBC # BLD: 4.8 K/UL (ref 4–11)

## 2021-10-05 PROCEDURE — 36415 COLL VENOUS BLD VENIPUNCTURE: CPT

## 2021-10-05 PROCEDURE — 85027 COMPLETE CBC AUTOMATED: CPT

## 2021-10-05 PROCEDURE — 83880 ASSAY OF NATRIURETIC PEPTIDE: CPT

## 2021-10-05 PROCEDURE — 84443 ASSAY THYROID STIM HORMONE: CPT

## 2021-10-05 PROCEDURE — 80053 COMPREHEN METABOLIC PANEL: CPT

## 2021-10-05 PROCEDURE — 82306 VITAMIN D 25 HYDROXY: CPT

## 2021-10-05 RX ORDER — ERGOCALCIFEROL 1.25 MG/1
50000 CAPSULE ORAL WEEKLY
Qty: 12 CAPSULE | Refills: 1 | Status: SHIPPED | OUTPATIENT
Start: 2021-10-05 | End: 2022-03-24

## 2021-10-05 RX ORDER — LEVOTHYROXINE SODIUM 0.05 MG/1
50 TABLET ORAL DAILY
Qty: 30 TABLET | Refills: 2 | Status: SHIPPED | OUTPATIENT
Start: 2021-10-05 | End: 2021-11-22 | Stop reason: SDUPTHER

## 2021-10-05 NOTE — TELEPHONE ENCOUNTER
----- Message from Nidhi Lozoya MD sent at 10/5/2021  4:29 PM EDT -----  Cardiology forwarded her thyroid blood test results to me.   Patient appears to be low in thyroid and would recommend start levothyroxine at 50 mcg daily and recheck TSH in 1 month

## 2021-10-05 NOTE — TELEPHONE ENCOUNTER
Before Your Surgery      Call your surgeon if there is any change in your health. This includes signs of a cold or flu (such as a sore throat, runny nose, cough, rash or fever).    Do not smoke, drink alcohol or take over the counter medicine (unless your surgeon or primary care doctor tells you to) for the 24 hours before and after surgery.    If you take prescribed drugs: Follow your doctor s orders about which medicines to take and which to stop until after surgery.    Eating and drinking prior to surgery: follow the instructions from your surgeon    Take a shower or bath the night before surgery. Use the soap your surgeon gave you to gently clean your skin. If you do not have soap from your surgeon, use your regular soap. Do not shave or scrub the surgery site.  Wear clean pajamas and have clean sheets on your bed.     Pre-operative medication management   1. Avoid/Stop over the counter vitamins and Fish Oils  2. Avoid/Stop Asprin/NSAIDS at least 7-10 days prior to surgery.        calling to let [facility] [place] that he cant get this patient medtronic monitor to work . He is calling medtronic to see what the problem is .

## 2021-10-06 RX ORDER — SPIRONOLACTONE 25 MG/1
25 TABLET ORAL 2 TIMES DAILY
Qty: 180 TABLET | Refills: 1 | Status: SHIPPED | OUTPATIENT
Start: 2021-10-06 | End: 2022-04-21

## 2021-10-06 NOTE — TELEPHONE ENCOUNTER
We received remote transmission from patient's monitor at home. Transmission shows normal sensing and pacing function. E  OptiVol WNL. Routing to HF. See interrogation under cardiology tab in the [facility] [address] field for more details.

## 2021-10-14 ENCOUNTER — ANTI-COAG VISIT (OUTPATIENT)
Dept: FAMILY MEDICINE CLINIC | Age: 76
End: 2021-10-14

## 2021-10-14 ENCOUNTER — NURSE ONLY (OUTPATIENT)
Dept: CARDIOLOGY CLINIC | Age: 76
End: 2021-10-14
Payer: MEDICARE

## 2021-10-14 ENCOUNTER — TELEPHONE (OUTPATIENT)
Dept: FAMILY MEDICINE CLINIC | Age: 76
End: 2021-10-14

## 2021-10-14 DIAGNOSIS — Z95.810 ICD (IMPLANTABLE CARDIOVERTER-DEFIBRILLATOR), DUAL, IN SITU: ICD-10-CM

## 2021-10-14 DIAGNOSIS — I25.5 ISCHEMIC CARDIOMYOPATHY: ICD-10-CM

## 2021-10-14 DIAGNOSIS — I50.23 ACUTE ON CHRONIC SYSTOLIC CHF (CONGESTIVE HEART FAILURE) (HCC): ICD-10-CM

## 2021-10-14 LAB — INR BLD: 1.7

## 2021-10-14 PROCEDURE — G2066 INTER DEVC REMOTE 30D: HCPCS | Performed by: INTERNAL MEDICINE

## 2021-10-14 PROCEDURE — 93297 REM INTERROG DEV EVAL ICPMS: CPT | Performed by: INTERNAL MEDICINE

## 2021-10-14 NOTE — TELEPHONE ENCOUNTER
[facility] INR  The PT's INR is 1.7 today

## 2021-10-15 NOTE — PROGRESS NOTES
We received remote transmission from patient's dual chamber ICD monitor at home. Transmission shows normal sensing and pacing function. No new arrhythmias/events recorded. Optivol is within normal range; slight increase noted not yet to 60 threshold. EP physician will review. See interrogation under cardiology tab in the [address] [place] [address] [address] field for more details. Will continue to monitor remotely.

## 2021-10-18 RX ORDER — LOSARTAN POTASSIUM 25 MG/1
TABLET ORAL
Qty: 90 TABLET | Refills: 0 | Status: SHIPPED | OUTPATIENT
Start: 2021-10-18 | End: 2022-01-12

## 2021-10-18 RX ORDER — METOPROLOL SUCCINATE 50 MG/1
TABLET, EXTENDED RELEASE ORAL
Qty: 90 TABLET | Refills: 0 | Status: SHIPPED | OUTPATIENT
Start: 2021-10-18 | End: 2022-01-12

## 2021-10-22 ENCOUNTER — TELEPHONE (OUTPATIENT)
Dept: FAMILY MEDICINE CLINIC | Age: 76
End: 2021-10-22

## 2021-10-22 ENCOUNTER — ANTI-COAG VISIT (OUTPATIENT)
Dept: FAMILY MEDICINE CLINIC | Age: 76
End: 2021-10-22

## 2021-10-22 LAB — INR BLD: 3.4

## 2021-10-22 NOTE — TELEPHONE ENCOUNTER
Patient's made call into office in regards to patient's INR results. INR today 3.4. Per  patient is taking 2.5mg on Monday and Wednesday and 5mg all other days. Per the anticoag flowsheet she was low on 10/14 with INR of 1.7 was told to increase to 7.5mg on Monday Thursday and 5mg all other days. Per  and wife patient only did a once time dose of the 7.5mg and then went back to the 2.5mg Monday/Wednesday and 5mg all other days. Please advise.

## 2021-11-02 ENCOUNTER — OFFICE VISIT (OUTPATIENT)
Dept: CARDIOLOGY CLINIC | Age: 76
End: 2021-11-02
Payer: MEDICARE

## 2021-11-02 ENCOUNTER — NURSE ONLY (OUTPATIENT)
Dept: CARDIOLOGY CLINIC | Age: 76
End: 2021-11-02
Payer: MEDICARE

## 2021-11-02 VITALS
BODY MASS INDEX: 33.43 KG/M2 | DIASTOLIC BLOOD PRESSURE: 62 MMHG | OXYGEN SATURATION: 95 % | HEIGHT: 66 IN | WEIGHT: 208 LBS | HEART RATE: 64 BPM | SYSTOLIC BLOOD PRESSURE: 110 MMHG

## 2021-11-02 DIAGNOSIS — I25.10 CORONARY ARTERY DISEASE DUE TO LIPID RICH PLAQUE: ICD-10-CM

## 2021-11-02 DIAGNOSIS — I47.1 PAROXYSMAL SVT (SUPRAVENTRICULAR TACHYCARDIA) (HCC): ICD-10-CM

## 2021-11-02 DIAGNOSIS — I50.22 CHRONIC SYSTOLIC CHF (CONGESTIVE HEART FAILURE), NYHA CLASS 3 (HCC): ICD-10-CM

## 2021-11-02 DIAGNOSIS — I48.19 PERSISTENT ATRIAL FIBRILLATION (HCC): Primary | ICD-10-CM

## 2021-11-02 DIAGNOSIS — I25.83 CORONARY ARTERY DISEASE DUE TO LIPID RICH PLAQUE: ICD-10-CM

## 2021-11-02 DIAGNOSIS — E03.9 HYPOTHYROIDISM, UNSPECIFIED TYPE: ICD-10-CM

## 2021-11-02 DIAGNOSIS — I25.5 ISCHEMIC CARDIOMYOPATHY: ICD-10-CM

## 2021-11-02 DIAGNOSIS — I48.0 PAF (PAROXYSMAL ATRIAL FIBRILLATION) (HCC): ICD-10-CM

## 2021-11-02 DIAGNOSIS — I10 ESSENTIAL HYPERTENSION: ICD-10-CM

## 2021-11-02 DIAGNOSIS — Z95.810 ICD (IMPLANTABLE CARDIOVERTER-DEFIBRILLATOR), DUAL, IN SITU: ICD-10-CM

## 2021-11-02 PROCEDURE — 99214 OFFICE O/P EST MOD 30 MIN: CPT | Performed by: NURSE PRACTITIONER

## 2021-11-02 PROCEDURE — 93283 PRGRMG EVAL IMPLANTABLE DFB: CPT | Performed by: INTERNAL MEDICINE

## 2021-11-02 RX ORDER — AMIODARONE HYDROCHLORIDE 200 MG/1
100 TABLET ORAL DAILY
Qty: 45 TABLET | Refills: 0
Start: 2021-11-02 | End: 2022-03-10

## 2021-11-02 NOTE — PROGRESS NOTES
[address]   Electrophysiology  Pemaquidjess Martines, APRN-CNP  Attending EP: Dr. Todd Franklin   Date: 11/2/2021  I had the privilege of visiting Yulisa Knight in the office. Chief Complaint:   Chief Complaint   Patient presents with    Follow-up     4 month    Atrial Fibrillation     History of Present Illness: History obtained from patient and medical record. Yulisa Knight is 77 y.o. female with a past medical history of HTN, HLD, DM, CAD s/p CABG x 3, S/p bioprosthetic MVR, WAYNE clip (8/2018), afib s/p Maze 2018, CMP (EF25-30%) DVT/PE on warfarin and sCHF/ischemic CMP, and aortic stenosis. S/p ablation of afib w/ PVI, roofline, posterior, CTI flutter 10/30/2019. S/p ILR. Hx of Mobitz I AVB and prolonged [place] interval. S/p JUDE/CV. Has been treated with amiodarone stopped in 2018. Hospitalized 5/2021 with palpitations and tachycardia, found to be in afib/RVR and started on diltiazem gtt. Coreg had previously been reduced. S/p LHC showed patent grafts. Treated with amiodarone. S/p dual chamber AICD for ischemic CMP and removal of ILR. Interval history: Today, Yulisa Knight is being seen for AF and device monitoring. No recurrent AF on device interrogation today. Recently developed hypothyroid and recently placed on synthroid. Repeat testing on Friday. Optivol accumulation on AICD. Reports worsening swelling and weight gain of 8 lbs in the last month. Not sure if SOB is any worsening. She has the another diuretic that she takes as needed for weight gain. Denies CP, palpitations, dizziness, or syncope. With regard to medication therapy the patient has been compliant with prescribed regimen. Assessment:  Persistent Atrial Fibrillation  - ECG today shows SR  - On amiodarone 200 mg daily and Coreg   - [id number] Vasc score and anticoagulation discussed.  High risk for stroke and thromboembolism.   ~ S/p WAYNE clip 8/2018, no need for [facility] from AF perspective  - Afib risk factors including age, HTN, obesity, inactivity and IRIS were discussed with patient. Risk factor modification recommended   ~ TSH 7.67 (10/5/2021)     - Treatment options including cardioversion, rate control strategy, antiarrhythmics, anticoagulation and possible ablation were discussed with patient. Risks, benefits and alternative of each treatment options were explained.  All questions answered    ~ Failed DCCV 4/20/2021 and she was loaded with amiodarone; successful DCCV 5/4/2021   ~ No recurrence on device interrogation   ~ Newly hypothyroid and started on synthroid, add T3 and T4; reduce amiodarone to 100 mg daily   Ischemic CMP/Implantable device   - S/p dual chamber AICD 7/7/2021   - The CIED was interrogated and I reviewed all data    - Device interrogation today shows FRANCISCO JAVIER 10 yrs, AT/AF 0, AP 0.9%,  <0.2%  Chronic Systolic CHF   - Appears overloaded; + weight gain and increased swelling   - Reports she takes metolazone as needed for swelling up to 2 times weekly and she was planning to take a dose today; I do not see this on her medication list will discuss with HF   - She will call back if her symptoms have not improved with current regimen   - Continue current treatment  AS   - Moderate per echo   - JUDE 5/4/2021 Dr. Lisa Humphries; stable  Hx of DVT/PE   - On warfarin  CAD   - S/p CABG   - C 5/3/2021 showed patent grafts   - Continue medical therapy   HTN-goal <130/80   - Controlled   - Continue current medications   - Encouraged patient to check BP at home, log and bring to office visits  - Discussed lifestyle modifications, weight loss, low sodium diet  Hypothyroid   - New diagnosis   - Will get additional studies and reduce amiodarone  Plan  - Take as needed diuretic and call if weight not back down within a several days  - Reduce amiodarone 100 mg daily  - Add thyroid studies to TSH on Friday    Discussed with NPRG     F/U: Follow-up with EP in 3 months   Follow up with device clinic as scheduled  Call [facility] at [phone] with any questions    Allergies: Allergies   Allergen Reactions    Irceizcs-Rmvkkcg-Wpcpiw [Fluocinolone] Shortness Of Breath    Ciprofloxacin Shortness Of Breath    Diovan [Valsartan] Shortness Of Breath    Flagyl [Metronidazole] Shortness Of Breath     Has taken diflucan at home 12/7/15    Metformin And Related [Metformin And Related] Shortness Of Breath    Benazepril      Other reaction(s): Not Recorded    Morphine      Bad reaction. \"makes her feel horrible\".  Saxagliptin      Other reaction(s): Not Recorded    Levaquin [Levofloxacin] Rash     Home Medications:  Prior to Visit Medications    Medication Sig Taking? Authorizing Provider   losartan (COZAAR) 25 MG tablet TAKE 1 TABLET DAILY  Keesha Moody APRN - CNP   metoprolol succinate (TOPROL XL) 50 MG extended release tablet TAKE 1 TABLET DAILY  SOLEDAD Díaz - CNP   spironolactone (ALDACTONE) 25 MG tablet Take 1 tablet by mouth 2 times daily  Violeta Palomares APRN - CNS   vitamin D (ERGOCALCIFEROL) 1.25 MG (83280 UT) CAPS capsule Take 1 capsule by mouth once a week  Violeta Palomares, APRN - CNS   levothyroxine (SYNTHROID) 50 MCG tablet Take 1 tablet by mouth daily  Alison Perez MD   torsemide (DEMADEX) 20 MG tablet Take 2 tablets by mouth 2 times daily  Alison Perez MD   midodrine (PROAMATINE) 2.5 MG tablet TAKE ONE TABLET BY MOUTH TWICE A DAY  Violeta Palomares, APRN - CNS   FreeStyle Lancets MISC 1 each by Does not apply route 4 times daily  Alison Perez MD   Blood Glucose Monitoring Suppl (FREESTYLE LITE) GAETANO 1 Device by Does not apply route 4 times daily  Historical Provider, MD   oxyCODONE (ROXICODONE) 5 MG immediate release tablet Take 0.5 mg by mouth daily.   Historical Provider, MD   insulin glargine (LANTUS SOLOSTAR) 100 UNIT/ML injection pen INJECT 22 UNITS IN THE MORNING  Alison Perez MD   insulin lispro, 1 Unit Dial, (HUMALOG KWIKPEN) 100 UNIT/ML SOPN Sliding scale-140-199-2 units, 200-249- 3 units, 250-300-4 units, >300 take 5 units  Manford Denver, MD   montelukast (SINGULAIR) 10 MG tablet TAKE 1 TABLET NIGHTLY  Manford Denver, MD   potassium chloride (KLOR-CON M) 10 MEQ extended release tablet TAKE 1 TABLET DAILY  Manford Denver, MD   warfarin (COUMADIN) 5 MG tablet TAKE 1 TABLET DAILY  Manford Denver, MD   FREESTYLE LITE strip USE TO TEST FOUR TIMES A DAY  Manford Denver, MD   amiodarone (CORDARONE) 200 MG tablet Take 1 tablet by mouth daily  Samuel Moreira MD   Insulin Pen Needle (BD PEN NEEDLE JANNET U/F) 32G X 4 MM MISC USE 1 PEN NEEDLE FOUR TIMES A DAY  Manford Denver, MD   magnesium oxide (MAG-OX) 400 MG tablet Take 1 tablet by mouth daily  Clovis Helton MD   ACETAMINOPHEN PO Take 500 mg by mouth every 6 hours as needed   Historical Provider, MD   albuterol sulfate  (90 Base) MCG/ACT inhaler USE 2 INHALATIONS EVERY 6 HOURS AS NEEDED FOR WHEEZING  Manford Denver, MD   albuterol (PROVENTIL) (2.5 MG/3ML) 0.083% nebulizer solution Take 3 mLs by nebulization every 6 hours as needed for Wheezing  Manford Denver, MD   polyethylene glycol (GLYCOLAX) 17 GM/SCOOP powder Take 17 g by mouth daily   Historical Provider, MD   omeprazole (PRILOSEC) 20 MG delayed release capsule Take 20 mg by mouth daily  Historical Provider, MD   ondansetron (ZOFRAN) 4 MG tablet Take 1 tablet by mouth 3 times daily as needed for Nausea or Vomiting  Manford Denver, MD   nystatin (MYCOSTATIN) 402837 UNIT/ML suspension TAKE ONE TEASPOONFUL BY MOUTH FOUR TIMES A DAY FOR 5 DAYS  Manford Denver, MD   aspirin 81 MG chewable tablet Take 1 tablet by mouth daily  Manford Denver, MD   vitamin B-12 500 MCG tablet Take 1 tablet by mouth daily  Sendy Aguero MD      Past Medical History:  Past Medical History:   Diagnosis Date    Asthma     Atrial fibrillation (HCC)     Eosinophilia     Hemoptysis     HIGH CHOLESTEROL     Hx of blood clots     Hypertension     Irregular heart beat     Other specified gastritis without mention of hemorrhage     Palpitations     Skin cancer     basal and squamous    Type II or unspecified type diabetes mellitus without mention of complication, not stated as uncontrolled      Past Surgical History:    has a past surgical history that includes Cholecystectomy;  section; Colonoscopy (2017); skin biopsy; bronchoscopy (2016); Coronary artery bypass graft (2018); Mitral valve replacement (2018); transesophageal echocardiogram (2018); Tunneled venous catheter placement (Left, 2018); Cardiac catheterization (2018); Insertable Cardiac Monitor (Left, 2018); Colonoscopy (2014); Hysterectomy; Upper gastrointestinal endoscopy (N/A, 10/10/2018); Colonoscopy (10/10/2018); Colonoscopy (N/A, 2020); Pain management procedure (N/A, 2020); Pain management procedure (Bilateral, 2021); and Cardiac catheterization ( and 5/3 2021). Social History:  Reviewed. reports that she has never smoked. She has never used smokeless tobacco. She reports that she does not drink alcohol and does not use drugs. Family History:  Reviewed. family history includes Asthma in her mother; Cancer in her father; Heart Disease in her mother; High Blood Pressure in her mother; Hypertension in her mother. Denies family history of sudden cardiac death, arrhythmia, premature CAD    Review of System:  · Constitutional: No weakness + weight gain  · HEENT: No visual changes. No mouth sores or sore throat. · Cardiovascular: denies chest pain, admits to dyspnea on exertion, denies palpitations or denies loss of consciousness. No cough, hemoptysis, denies pleuritic pain, or phlebitis. denies dizziness. · Respiratory: denies cough or wheezing. · Gastrointestinal: Negative, No blood in stools. · Genitourinary: No hematuria.   · Neurological: No focal weakness  · Psychiatric: No confusion, anxiety, or depression. · Hem/Lymph: Denies abnormal bruising or bleeding. Physical Examination:  There were no vitals filed for this visit. Wt Readings from Last 3 Encounters:   08/24/21 201 lb (91.2 kg)   08/17/21 201 lb (91.2 kg)   08/17/21 201 lb (91.2 kg)      Constitutional: Cooperative and in no apparent distress, and appears well nourished   Skin: Warm and pink; no cyanosis or bruising   HEENT: Symmetric and normocephalic. Conjunctiva pink with clear sclera. Mucus membranes pink and moist.    Respiratory: Respirations symmetric and unlabored. Lungs clear to auscultation bilaterally, no wheezing, rhonchi, or crackles. + diminished   Cardiovascular:  regular rate and rhythm. S1 & S2 present, positive for murmur. negative elevation of JVP. + nonpitting edema   Musculoskeletal:  No focal weakness.  Neurological/Psych: Awake and orientated to person, place and time. Calm affect, appropriate mood. Pertinent labs, diagnostic, device, and imaging results reviewed as a part of this visit    LABS    CBC:   Lab Results   Component Value Date    WBC 4.8 10/05/2021    HGB 11.8 (L) 10/05/2021    HCT 36.2 10/05/2021    MCV 91.5 10/05/2021     10/05/2021     BMP:   Lab Results   Component Value Date    CREATININE 1.7 (H) 10/05/2021    BUN 38 (H) 10/05/2021     10/05/2021    K 4.0 10/05/2021     10/05/2021    CO2 27 10/05/2021     CrCl cannot be calculated (Unknown ideal weight.).    Lab Results   Component Value Date     09/02/2020     11/27/2018       Thyroid:   Lab Results   Component Value Date    TSH 7.67 (H) 10/05/2021     Lipid Panel:   Lab Results   Component Value Date    CHOL 149 12/23/2020    HDL 40 12/23/2020    TRIG 94 03/03/2021     LFTs:  Lab Results   Component Value Date    ALT 12 10/05/2021    AST 18 10/05/2021    ALKPHOS 109 10/05/2021    BILITOT 0.4 10/05/2021     Coags:   Lab Results   Component Value Date    PROTIME 12.9 05/05/2021    INR 3.40 10/22/2021    APTT 33.7 2021     EC2021 SR HR 66, , QTc 444    ECHO:  2021  Summary   Overall left ventricular systolic function is severely depressed . Ejection fraction is visually estimated to be 10-15 %. E/e'= 23.2 GLS=-3.4   Moderately dilated left ventricle. There is severe diffuse hypokinesis. Indeterminate diastolic function. A bioprosthetic mitral valve is well seated with peak velocity of 1.59m/s   and a mean gradient of 3mmHg. The left atrium is moderately dilated. Mild aortic stenosis with a peak velocity of 2.5m/s and a mean pressure gradient of 14mmHg. The aortic valve is thickened/calcified with decreased leaflet mobility   consistent with aortic stenosis. Mild to moderate tricuspid regurgitation. Estimated pulmonary artery systolic pressure is mildly to moderately elevated at 46 mmHg assuming a right atrial pressure of 8 mmHg. 2020  Summary   Dobutamine echocardiogram for aortic stenosis. Very technically limited exam due to atrial fibrillation. Baseline echocardiogram shows severe left ventricular dysfunction with   anterior, lateral and apical hypokinesis with an ejection fraction of 20-25   %. There is no improvement in wall motion with low or high dose dobutamine   suggestive of nonviable myocardium. Mild prosthetic aortic valve stenosis with a mean gradient of 16mmHg and   peak velocity of 2.47m/s at rest. After dobutamine stress the mean AV   gradient rises to 20mmHg with 20mcg of dobutamine consistent with mild to   moderate aortic stenosis. Prosthetic mitral valve with a mean gradient of 7mmHg  9/15/2020  Summary   Left ventricular cavity size is dilated. There is normal wall thickness with a moderately severe reduction in   systolic function. LV ejection fraction is visually estimated to be 25-30%. Indeterminate diastolic function.    The right ventricle is not well visualized but appears to be normal in size   with moderately reduced function. The left atrium is moderately dilated. A bioprosthetic mitral valve with a mean gradient of 7 mmHg. This may be a   normal gradient for this valve but could suggest mild stenosis. Trivial mitral regurgitation. The aortic valve is thickened/calcified with a mean gradient of 16 mmHg   consistent with at least mild aortic stenosis. This is likely underestimated   due to low cardiac output secondary to LV dysfunction. No significant aortic valve regurgitation. Moderate tricuspid regurgitation with RVSP of 48 mmHg. JUDE: 5/4/2021  Summary   *Aortic valve - thickened and calcified, opens adequately, trivial   regurgitation, area by planimetry 1.21cm2 - not severe   *Tricuspid valve - moderate regurgitation   *Mitral valve - well seated mitral valve, trivial regurgitation     Stress Test: 8/7/2018  Summary    Small sized lateral completely reversible defect of mild intensity    consistent with ischemia in the territory of the LCx and/or LAD .    Normal LV function.    Overall findings represent a intermediate risk scan.         Diet & Exercise:   The patient is counseled to follow a low salt diet to assure blood pressure remains controlled for cardiovascular risk factor modification   The patient is counseled to avoid excess caffeine, and energy drinks as this may exacerbated ectopy and arrhythmia   The patient is counseled to lose weight to control cardiovascular risk factors   Exercise program discussed: To improve overall cardiovascular health, the patient is instructed to increase cardiovascular related activities with a goal of 150 min/week of moderate level activity or 10,000 steps per day. Encouraged to perform as much activity as tolerated     I have addressed the patient's cardiac risk factors and adjusted pharmacologic treatment as needed. In addition, I have reinforced the need for patient directed risk factor modification.     I independently reviewed the device check interrogation and ECG    All questions and concerns were addressed with the patient. Alternatives to treatment were discussed. Thank you for allowing to us to participate in the care of Jarvis Barajas.     SOLEDAD Garcia-CNP  [address]   Office: [phone]

## 2021-11-02 NOTE — PROGRESS NOTES
Patient comes in for programming evaluation for her defibrillator. All sensing and pacing parameters are within normal range. Battery life 10.8 years  AP 0.9%.  <0.1%. No episodes noted. Patient remains on warfarin, amiodarone and metoprolol. No changes need to be made at this time. Please see interrogation for more detail. Optivol is elevated. Possible OptiVol fluid accumulation: 27-Oct-2021 -- ongoing. Patient will see Sagar Quintero today and follow up in 3 months in office or remotely.

## 2021-11-02 NOTE — PATIENT INSTRUCTIONS
- Take as needed diuretic  - Reduce amiodarone to 100 mg dialy  - Blood work  - [employer] daily, if you gain more than 3 lbs in a day or 5 lbs in a week call the office  - Limit sodium and fluid intake  - Call office if weight does not normalize in the next several days  - Check your blood pressure at home, if your top number blood pressure is >140/90 or <95/50 please call the office  - Follow up in 3 months 3

## 2021-11-04 ENCOUNTER — OFFICE VISIT (OUTPATIENT)
Dept: FAMILY MEDICINE CLINIC | Age: 76
End: 2021-11-04
Payer: MEDICARE

## 2021-11-04 ENCOUNTER — HOSPITAL ENCOUNTER (OUTPATIENT)
Age: 76
Discharge: HOME | End: 2021-11-04
Payer: MEDICARE

## 2021-11-04 ENCOUNTER — HOSPITAL ENCOUNTER (OUTPATIENT)
Dept: VASCULAR LAB | Age: 76
Discharge: HOME | End: 2021-11-04
Payer: MEDICARE

## 2021-11-04 VITALS
RESPIRATION RATE: 14 BRPM | HEIGHT: 66 IN | TEMPERATURE: 97.7 F | SYSTOLIC BLOOD PRESSURE: 110 MMHG | HEART RATE: 78 BPM | BODY MASS INDEX: 33.57 KG/M2 | DIASTOLIC BLOOD PRESSURE: 70 MMHG

## 2021-11-04 DIAGNOSIS — E03.9 HYPOTHYROIDISM, UNSPECIFIED TYPE: ICD-10-CM

## 2021-11-04 DIAGNOSIS — M79.662 PAIN AND SWELLING OF LEFT LOWER LEG: Primary | ICD-10-CM

## 2021-11-04 DIAGNOSIS — M79.89 PAIN AND SWELLING OF LEFT LOWER LEG: Primary | ICD-10-CM

## 2021-11-04 DIAGNOSIS — M79.662 PAIN AND SWELLING OF LEFT LOWER LEG: ICD-10-CM

## 2021-11-04 DIAGNOSIS — M79.89 PAIN AND SWELLING OF LEFT LOWER LEG: ICD-10-CM

## 2021-11-04 DIAGNOSIS — Z23 NEED FOR INFLUENZA VACCINATION: ICD-10-CM

## 2021-11-04 LAB
T3 FREE: 1.8 PG/ML (ref 2.3–4.2)
T4 FREE: 1.4 NG/DL (ref 0.9–1.8)
TSH SERPL DL<=0.05 MIU/L-ACNC: 4.13 UIU/ML (ref 0.27–4.2)

## 2021-11-04 PROCEDURE — 84439 ASSAY OF FREE THYROXINE: CPT

## 2021-11-04 PROCEDURE — 84481 FREE ASSAY (FT-3): CPT

## 2021-11-04 PROCEDURE — 93971 EXTREMITY STUDY: CPT

## 2021-11-04 PROCEDURE — 90686 IIV4 VACC NO PRSV 0.5 ML IM: CPT | Performed by: NURSE PRACTITIONER

## 2021-11-04 PROCEDURE — G0008 ADMIN INFLUENZA VIRUS VAC: HCPCS | Performed by: NURSE PRACTITIONER

## 2021-11-04 PROCEDURE — 84443 ASSAY THYROID STIM HORMONE: CPT

## 2021-11-04 PROCEDURE — 99213 OFFICE O/P EST LOW 20 MIN: CPT | Performed by: NURSE PRACTITIONER

## 2021-11-04 PROCEDURE — 36415 COLL VENOUS BLD VENIPUNCTURE: CPT

## 2021-11-04 SDOH — ECONOMIC STABILITY: FOOD INSECURITY: WITHIN THE PAST 12 MONTHS, THE FOOD YOU BOUGHT JUST DIDN'T LAST AND YOU DIDN'T HAVE MONEY TO GET MORE.: NEVER TRUE

## 2021-11-04 SDOH — ECONOMIC STABILITY: FOOD INSECURITY: WITHIN THE PAST 12 MONTHS, YOU WORRIED THAT YOUR FOOD WOULD RUN OUT BEFORE YOU GOT MONEY TO BUY MORE.: NEVER TRUE

## 2021-11-04 ASSESSMENT — SOCIAL DETERMINANTS OF HEALTH (SDOH): HOW HARD IS IT FOR YOU TO PAY FOR THE VERY BASICS LIKE FOOD, HOUSING, MEDICAL CARE, AND HEATING?: NOT HARD AT ALL

## 2021-11-04 NOTE — PROGRESS NOTES
[facility]  : 1945  Encounter date: 2021    This raghu 77 y.o. female who presents with  Chief Complaint   Patient presents with    Leg Swelling     left leg swelling behind knee and down back of leg since yesterday, painful       History of present illness:    HPI Pt is 77year old female with left lower leg swelling and pain developed yesterday. Pt denies known trauma or twisting. No visible break in the skin. Pain radiates from back of knee to lateral side of leg. Pt reports pain described as sore and achy. Pt taking coumadin, last INR- 2.7 yesterday. Reports history of clots but not DVT. Pt reports decreased ROM. Has not tried anything so far. Pt taking diuretics, cardiac history of Afib, valve regurge. Swelling visible without redness or warmth. Current Outpatient Medications on File Prior to Visit   Medication Sig Dispense Refill    amiodarone (CORDARONE) 200 MG tablet Take 0.5 tablets by mouth daily 45 tablet 0    losartan (COZAAR) 25 MG tablet TAKE 1 TABLET DAILY 90 tablet 0    metoprolol succinate (TOPROL XL) 50 MG extended release tablet TAKE 1 TABLET DAILY 90 tablet 0    spironolactone (ALDACTONE) 25 MG tablet Take 1 tablet by mouth 2 times daily 180 tablet 1    vitamin D (ERGOCALCIFEROL) 1.25 MG (83350 UT) CAPS capsule Take 1 capsule by mouth once a week 12 capsule 1    levothyroxine (SYNTHROID) 50 MCG tablet Take 1 tablet by mouth daily 30 tablet 2    torsemide (DEMADEX) 20 MG tablet Take 2 tablets by mouth 2 times daily 360 tablet 1    midodrine (PROAMATINE) 2.5 MG tablet TAKE ONE TABLET BY MOUTH TWICE A DAY 60 tablet 5    FreeStyle Lancets MISC 1 each by Does not apply route 4 times daily 200 each 5    Blood Glucose Monitoring Suppl (FREESTYLE LITE) GAETANO 1 Device by Does not apply route 4 times daily      oxyCODONE (ROXICODONE) 5 MG immediate release tablet Take 0.5 mg by mouth daily.       insulin glargine (LANTUS SOLOSTAR) 100 UNIT/ML injection pen INJECT 22 UNITS IN THE MORNING 90 mL 1    insulin lispro, 1 Unit Dial, (HUMALOG KWIKPEN) 100 UNIT/ML SOPN Sliding scale-140-199-2 units, 200-249- 3 units, 250-300-4 units, >300 take 5 units 15 mL 0    montelukast (SINGULAIR) 10 MG tablet TAKE 1 TABLET NIGHTLY 90 tablet 3    potassium chloride (KLOR-CON M) 10 MEQ extended release tablet TAKE 1 TABLET DAILY 90 tablet 3    warfarin (COUMADIN) 5 MG tablet TAKE 1 TABLET DAILY 100 tablet 3    FREESTYLE LITE strip USE TO TEST FOUR TIMES A  strip 3    Insulin Pen Needle (BD PEN NEEDLE JANNET U/F) 32G X 4 MM MISC USE 1 PEN NEEDLE FOUR TIMES A  each 3    magnesium oxide (MAG-OX) 400 MG tablet Take 1 tablet by mouth daily 90 tablet 3    ACETAMINOPHEN PO Take 500 mg by mouth every 6 hours as needed       albuterol sulfate  (90 Base) MCG/ACT inhaler USE 2 INHALATIONS EVERY 6 HOURS AS NEEDED FOR WHEEZING 25.5 g 2    albuterol (PROVENTIL) (2.5 MG/3ML) 0.083% nebulizer solution Take 3 mLs by nebulization every 6 hours as needed for Wheezing 120 each 3    polyethylene glycol (GLYCOLAX) 17 GM/SCOOP powder Take 17 g by mouth daily       omeprazole (PRILOSEC) 20 MG delayed release capsule Take 20 mg by mouth daily      ondansetron (ZOFRAN) 4 MG tablet Take 1 tablet by mouth 3 times daily as needed for Nausea or Vomiting 30 tablet 0    nystatin (MYCOSTATIN) 112328 UNIT/ML suspension TAKE ONE TEASPOONFUL BY MOUTH FOUR TIMES A DAY FOR 5 DAYS 1 Bottle 2    aspirin 81 MG chewable tablet Take 1 tablet by mouth daily 30 tablet 3    vitamin B-12 500 MCG tablet Take 1 tablet by mouth daily 30 tablet 3     No current facility-administered medications on file prior to visit.       Allergies   Allergen Reactions    Ebaqpljo-Zjjexls-Jbeikm [Fluocinolone] Shortness Of Breath    Ciprofloxacin Shortness Of Breath    Diovan [Valsartan] Shortness Of Breath    Flagyl [Metronidazole] Shortness Of Breath     Has taken diflucan at home 12/7/15    Metformin And Related [Metformin And Related] Shortness Of Breath    Benazepril      Other reaction(s): Not Recorded    Morphine      Bad reaction. \"makes her feel horrible\".      Saxagliptin      Other reaction(s): Not Recorded    Levaquin [Levofloxacin] Rash     Past Medical History:   Diagnosis Date    Asthma     Atrial fibrillation (HCC)     Eosinophilia     Hemoptysis     HIGH CHOLESTEROL     Hx of blood clots     Hypertension     Irregular heart beat     Other specified gastritis without mention of hemorrhage     Palpitations     Skin cancer     basal and squamous    Type II or unspecified type diabetes mellitus without mention of complication, not stated as uncontrolled       Past Surgical History:   Procedure Laterality Date    BRONCHOSCOPY  2016    Dr. Purnima Moon - brushings from [facility],Mc42-10  2018    Dr. Adriana Tello and 5/3 2021    Cardiac cath, cardioversion and rafat     SECTION      CHOLECYSTECTOMY      COLONOSCOPY  2017    Dr. Navin Chavez - sigmoid diverticulosis, polypectomies x3    COLONOSCOPY  2014    Dr. Navin Chavez - sigmoid diverticulosis, polypectomies x3, internal hemorrhoids    COLONOSCOPY  10/10/2018    w/biopsy performed by Manju Dimas MD at [address] COLONOSCOPY N/A 2020    COLONOSCOPY DIAGNOSTIC performed by Purnima Moon MD at [address]  2018    Dr. Mandeep Bahena - x3 (LIMA-LAD, L SV-D1-PLV) modified BL MAZE procedure w/obliteration of WAYNE using 45mm AtriClip    HYSTERECTOMY      INSERTABLE CARDIAC MONITOR Left 2018    Dr. Tsering Zavala - Zakia Lobato # [id number] Medtronic    MITRAL VALVE REPLACEMENT  2018    Dr. Mandeep Bahena - 27mm Medtronic Cinch tissue valve    PAIN MANAGEMENT PROCEDURE N/A 2020    C6-C7 MIDLINE  EPIDURAL STEROID INJECTION WITH FLUOROSCOPY performed by Roverto Morley MD at [address] [place] [address] 1/18/2021    BILATERAL T11 TRANSFORAMINAL EPIDURAL STEROID INJECTION WITH FLUOROSCOPY performed by Tessa Barrett MD at [address] TRANSESOPHAGEAL ECHOCARDIOGRAM  08/21/2018    during CABG/MVR    TUNNELED VENOUS CATHETER PLACEMENT Left 08/23/2018    Dr. Rosemary Barker for HD---since removed    UPPER GASTROINTESTINAL ENDOSCOPY N/A 10/10/2018    w/biopsy performed by Richard Velazquez MD at [address]      Family History   Problem Relation Age of Onset    Cancer Father     Asthma Mother     Hypertension Mother     Heart Disease Mother     High Blood Pressure Mother       Social History     Tobacco Use    Smoking status: Never Smoker    Smokeless tobacco: Never Used   Substance Use Topics    Alcohol use: No        Review of Systems    Objective:    /70   Pulse 78   Temp 97.7 °F (36.5 °C)   Resp 14   Ht 5' 6\" (1.676 m)   BMI 33.57 kg/m²         BP Readings from Last 3 Encounters:   11/04/21 110/70   11/02/21 110/62   08/17/21 100/70     Wt Readings from Last 3 Encounters:   11/02/21 208 lb (94.3 kg)   08/24/21 201 lb (91.2 kg)   08/17/21 201 lb (91.2 kg)     BMI Readings from Last 3 Encounters:   11/04/21 33.57 kg/m²   11/02/21 33.57 kg/m²   08/24/21 30.56 kg/m²       Physical Exam  Vitals reviewed. Constitutional:       Appearance: Normal appearance. She is well-developed. She is obese. Cardiovascular:      Rate and Rhythm: Normal rate and regular rhythm. Pulses: Normal pulses. Heart sounds: Normal heart sounds. Pulmonary:      Effort: Pulmonary effort is normal.      Breath sounds: Normal breath sounds. Musculoskeletal:         General: Swelling (L knee down to calf) and tenderness present. No signs of injury. Right lower leg: Edema present. Left lower leg: Edema present. Skin:     General: Skin is warm and dry. Neurological:      Mental Status: She is alert and oriented to person, place, and time. Assessment/Plan    1.  Pain and swelling of left lower leg  Discussed differentials including DVT, cellulitis, lymphadema, varicose vein  - VL Extremity Venous Left; Future    2. Need for influenza vaccination  Aministered  - INFLUENZA, QUADV,6-35 MO, IM, PF, PREFILL SYR, 0.25ML (Grabiel Ashraf, FILIPE)      Return if symptoms worsen or fail to improve, for unresolved symptoms. This dictation was generated by voice recognition computer software. Although all attempts are made to edit the dictation for accuracy, there may be errors in the transcription that are not intended.

## 2021-11-05 ENCOUNTER — ANTI-COAG VISIT (OUTPATIENT)
Dept: FAMILY MEDICINE CLINIC | Age: 76
End: 2021-11-05

## 2021-11-05 ENCOUNTER — TELEPHONE (OUTPATIENT)
Dept: CARDIOLOGY CLINIC | Age: 76
End: 2021-11-05

## 2021-11-05 LAB — INR BLD: 2.7

## 2021-11-05 NOTE — TELEPHONE ENCOUNTER
----- Message from SOLEDAD Veronica CNP sent at 11/5/2021  4:57 PM EDT -----  I reviewed thyroid labs and they are better. Continue current medications. Follow up with Dr. Anatoliy Lopez.     KRYSTIAN Veronica

## 2021-11-08 ENCOUNTER — NURSE ONLY (OUTPATIENT)
Dept: CARDIOLOGY CLINIC | Age: 76
End: 2021-11-08
Payer: MEDICARE

## 2021-11-08 DIAGNOSIS — I50.22 CHRONIC SYSTOLIC CHF (CONGESTIVE HEART FAILURE), NYHA CLASS 3 (HCC): ICD-10-CM

## 2021-11-08 DIAGNOSIS — Z95.810 ICD (IMPLANTABLE CARDIOVERTER-DEFIBRILLATOR), DUAL, IN SITU: ICD-10-CM

## 2021-11-08 DIAGNOSIS — I25.5 ISCHEMIC CARDIOMYOPATHY: ICD-10-CM

## 2021-11-08 DIAGNOSIS — I48.0 PAF (PAROXYSMAL ATRIAL FIBRILLATION) (HCC): ICD-10-CM

## 2021-11-08 DIAGNOSIS — I47.1 PAROXYSMAL SVT (SUPRAVENTRICULAR TACHYCARDIA) (HCC): ICD-10-CM

## 2021-11-08 PROCEDURE — 93296 REM INTERROG EVL PM/IDS: CPT | Performed by: INTERNAL MEDICINE

## 2021-11-08 PROCEDURE — 93295 DEV INTERROG REMOTE 1/2/MLT: CPT | Performed by: INTERNAL MEDICINE

## 2021-11-08 NOTE — PROGRESS NOTES
We received remote transmission from patient's monitor at home. Transmission shows normal sensing and pacing function. EP physician will review. See interrogation under cardiology tab in the [address] [facility] [address] field for more details. Optivol is within normal range.

## 2021-11-10 ENCOUNTER — OFFICE VISIT (OUTPATIENT)
Dept: FAMILY MEDICINE CLINIC | Age: 76
End: 2021-11-10
Payer: MEDICARE

## 2021-11-10 VITALS — OXYGEN SATURATION: 96 % | HEART RATE: 75 BPM | TEMPERATURE: 98.7 F

## 2021-11-10 DIAGNOSIS — Z20.822 SUSPECTED COVID-19 VIRUS INFECTION: Primary | ICD-10-CM

## 2021-11-10 PROCEDURE — 99213 OFFICE O/P EST LOW 20 MIN: CPT | Performed by: NURSE PRACTITIONER

## 2021-11-10 NOTE — PROGRESS NOTES
11/10/2021  [facility] (:  1945)    Allergies: Allergies   Allergen Reactions    Wgbdrkaw-Kdqeihp-Qqydoq [Fluocinolone] Shortness Of Breath    Ciprofloxacin Shortness Of Breath    Diovan [Valsartan] Shortness Of Breath    Flagyl [Metronidazole] Shortness Of Breath     Has taken diflucan at home 12/7/15    Metformin And Related [Metformin And Related] Shortness Of Breath    Benazepril      Other reaction(s): Not Recorded    Morphine      Bad reaction. \"makes her feel horrible\".  Saxagliptin      Other reaction(s): Not Recorded    Levaquin [Levofloxacin] Rash     (review in Epic)      FLU/RESPIRATORY/COVID-19 CLINIC EVALUATION    HPI:   Chief Complaint   Patient presents with    Cough        SYMPTOMS:    INSTRUCTIONS:  \"[x]\" Indicates a positive item  \"[]\" Indicates a negative item        Symptom duration, days:    Date symptoms started : 4 days prior. [] 1   [] 2   [] 3   [x] 4 - 7   [] 8 - 10   [] 11 - 13   [] >14    [] Fevers    [] Symptom (not measured)  [] Measured (Result:  degrees)  [] Chills  [x] Cough [] Dry [x] Productive- White   []Loss of Taste  [] Loss of Smell  []Decreased Appetite  [] Coughing up blood  }  [] Chest Congestion  [] Nasal Congestion  [x] Runny  Nose  [] Sneezing  [] Feeling short of breath   []Sometimes    [] Frequently    [] All the time     [] Chest pain     [] Headaches  []Tolerable  [] Severe     [] Fatigue  [] Sore throat  [] Muscle aches  [] Nausea  [] Vomiting  []Unable to keep fluids down     [] Diarrhea  [] Mild  []Severe       [x] Vaccinated for COVID 19  [] History of COVID 19 (Date:           )      [x] OTHER SYMPTOMS: Has been taking OTC decongestant with some relief. Has had recent sick contacts with daughter- negative for COVID. Symptom course:   [] Worsening     [x] Stable     [] Improving      RISK FACTORS:1INSTRUCTIONS:  \"[x]\" Indicates a positive item. Negative  for risk factors if not checked.     [] Close contact with a lab confirmed COVID-19 patient within 14 days of symptom onset  [] History of travel from affected geographical areas within 14 days of symptom onset        PHYSICAL EXAMINATION:    Vitals:    11/10/21 1440   Pulse: 75   Temp: 98.7 °F (37.1 °C)   SpO2: 96%          [x] Alert  [x] Oriented to person/place/time    [x] No apparent distress   [] Toxic appearing  [] Face flushed appearing     [x] Normal Mood  [] Anxious appearing      [] Sclera clear    [] Pinna, TMs,  Canals normal bilaterally  [] TM Red  [] Right [] Left [] Bilateral  [] TM Bulging [] Right [] Left []  Billateral    [] Oropharynx [] Clear [] Red [] Exudate [] Swollen    [] No adenopathy [] Adenopathy __________    [x] Lungs clear with good movement and effort  [x] Breathing appears normal     [] Speaks in complete sentences  [] Appears tachypneic   [] Wheezing           [] Rhonchi   [] Decreased    [] CV RRR  [] No Murmur  [x] Murmur  [] Irregular  [] Tachycardic    [] OTHER:  1}      TESTS ORDERED:    [] POCT FLU  [] POCT STREP  [] COVID-19 Test sent  [] Appointment made at testing clinic for patient to get a COVID test.       TEST RESULTS:    POCT FLU test:  [] Positive  [] Negative  POCT STREP test:  [] Positive  [] Negative    ASSESSMENT:  [] Allergic Rhinitis  [] Asthma Exacerbation  [] Bronchitis  [] COPD Exacerbation  [] Gastroenteritis  [] Influenza  [] Sinusitis  [] Strep Throat [] Sore Throat  [x] Viral URI   [] Possible COVID-19   [] Exposure to COVID -19  [] Positive for COVID  [] Screening for Viral Disease (COVID test no sx)        [address] was seen today for cough.     Diagnoses and all orders for this visit:    Suspected COVID-19 virus infection  -     COVID-19    Other orders  -     COVID-19  -     COVID-19  -     COVID-19              [] Low risk for complications from COVID 19  [] Moderate risk for complications from COVID 19  [x] High risk for complications from COVID 19    PLAN:    [x] Discharge home with written instructions for:  [] Flu management  [] Strep throat management  [] Viral respiratory illness management  [] Sinusitis management  [] Bronchitis Management  [x] Possible COVID-19 infection with self-quarantine and management of symptoms  [x] Follow-up with primary care physician or emergency department if worsens  [] Note given for work    [] Referred to emergency department for evaluation    Troy CALVILLO LPN, [place] scribing for and in the presence of SOLEDAD Ojeda CNP.  Electronically signed by Troy Jack LPN on 42/57/14 at 1:75 PM EST

## 2021-11-11 ENCOUNTER — TELEPHONE (OUTPATIENT)
Dept: FAMILY MEDICINE CLINIC | Age: 76
End: 2021-11-11

## 2021-11-11 PROBLEM — U07.1 COVID: Status: ACTIVE | Noted: 2021-11-11

## 2021-11-11 LAB — SARS-COV-2: DETECTED

## 2021-11-11 RX ORDER — METHYLPREDNISOLONE SODIUM SUCCINATE 125 MG/2ML
125 INJECTION, POWDER, LYOPHILIZED, FOR SOLUTION INTRAMUSCULAR; INTRAVENOUS ONCE
Status: CANCELLED | OUTPATIENT
Start: 2021-11-12 | End: 2021-11-12

## 2021-11-11 RX ORDER — SODIUM CHLORIDE 9 MG/ML
INJECTION, SOLUTION INTRAVENOUS CONTINUOUS
Status: CANCELLED | OUTPATIENT
Start: 2021-11-12

## 2021-11-11 RX ORDER — EPINEPHRINE 1 MG/ML
0.3 INJECTION, SOLUTION, CONCENTRATE INTRAVENOUS PRN
Status: CANCELLED | OUTPATIENT
Start: 2021-11-12

## 2021-11-11 RX ORDER — DIPHENHYDRAMINE HYDROCHLORIDE 50 MG/ML
50 INJECTION INTRAMUSCULAR; INTRAVENOUS ONCE
Status: CANCELLED | OUTPATIENT
Start: 2021-11-12 | End: 2021-11-12

## 2021-11-11 NOTE — TELEPHONE ENCOUNTER
Made follow up phone call to daughter. Per daughter patient has been worsening in regards to her breathing - was up last night doing her nebulizer treatment - this is not typical for her. Would like her to receive the monoclonal antibody infusion. Order form completed and cosigned by Dr. Brandee Bishop - faxed to [facility] infusion - [phone]. Confirmed they received. They will call to schedule most likely for tomorrow. Called  Roshni Bergeron and reviewed that infusion will be calling this afternoon to schedule for most likely tomorrow.

## 2021-11-11 NOTE — TELEPHONE ENCOUNTER
Patient tested positive for covid yesterday 11/10 and  would like to speak to you about next steps for him as well.     Please advise    Provider out of office

## 2021-11-11 NOTE — TELEPHONE ENCOUNTER
Advised  he can go to an outpatient testing center like [facility] [address] [place] [address] as patient is not established here. Advised to monitor symptoms and quarantine.

## 2021-11-11 NOTE — TELEPHONE ENCOUNTER
Patient's daughter is calling because the patient tested positive for COVID. She wants to discuss the next steps for the patient with a provider.     Please advise    Provider out of the office

## 2021-11-12 ENCOUNTER — HOSPITAL ENCOUNTER (OUTPATIENT)
Dept: INFUSION THERAPY | Age: 76
Setting detail: INFUSION SERIES
Discharge: HOME | End: 2021-11-12
Payer: MEDICARE

## 2021-11-12 VITALS
SYSTOLIC BLOOD PRESSURE: 112 MMHG | DIASTOLIC BLOOD PRESSURE: 67 MMHG | RESPIRATION RATE: 18 BRPM | OXYGEN SATURATION: 98 % | HEART RATE: 62 BPM | TEMPERATURE: 97.1 F

## 2021-11-12 DIAGNOSIS — U07.1 COVID: Primary | ICD-10-CM

## 2021-11-12 PROCEDURE — 6360000002 HC RX W HCPCS: Performed by: FAMILY MEDICINE

## 2021-11-12 PROCEDURE — 2580000003 HC RX 258: Performed by: FAMILY MEDICINE

## 2021-11-12 PROCEDURE — M0243 CASIRIVI AND IMDEVI INFUSION: HCPCS

## 2021-11-12 RX ORDER — SODIUM CHLORIDE 9 MG/ML
INJECTION, SOLUTION INTRAVENOUS CONTINUOUS
Status: CANCELLED | OUTPATIENT
Start: 2021-11-12

## 2021-11-12 RX ORDER — DIPHENHYDRAMINE HYDROCHLORIDE 50 MG/ML
50 INJECTION INTRAMUSCULAR; INTRAVENOUS ONCE
Status: CANCELLED | OUTPATIENT
Start: 2021-11-12 | End: 2021-11-12

## 2021-11-12 RX ORDER — SODIUM CHLORIDE 0.9 % (FLUSH) 0.9 %
5-40 SYRINGE (ML) INJECTION PRN
Status: DISCONTINUED | OUTPATIENT
Start: 2021-11-12 | End: 2021-11-13 | Stop reason: HOSPADM

## 2021-11-12 RX ORDER — SODIUM CHLORIDE 0.9 % (FLUSH) 0.9 %
5-40 SYRINGE (ML) INJECTION PRN
Status: CANCELLED | OUTPATIENT
Start: 2021-11-12

## 2021-11-12 RX ORDER — METHYLPREDNISOLONE SODIUM SUCCINATE 125 MG/2ML
125 INJECTION, POWDER, LYOPHILIZED, FOR SOLUTION INTRAMUSCULAR; INTRAVENOUS ONCE
Status: CANCELLED | OUTPATIENT
Start: 2021-11-12 | End: 2021-11-12

## 2021-11-12 RX ORDER — EPINEPHRINE 1 MG/ML
0.3 INJECTION, SOLUTION, CONCENTRATE INTRAVENOUS PRN
Status: CANCELLED | OUTPATIENT
Start: 2021-11-12

## 2021-11-12 RX ADMIN — CASIRIVIMAB AND IMDEVIMAB: 600; 600 INJECTION, SOLUTION, CONCENTRATE INTRAVENOUS at 09:26

## 2021-11-12 NOTE — PROGRESS NOTES
[address]     Regen-COV Visit    NAME:  Tello Barth  YOB: 1945  MEDICAL RECORD NUMBER:  [id number]  Episode Date:  11/12/2021    Patient [facility]   [x] per wheelchair   [] ambulatory     Indication for use:      [x] Treatment of Covid 19    [] Post Exposure Prophylaxis            - patient at high risk for progression to severe COVID-19             - patient with immunocompromising condition or is taking immunosuppressive medications            - high risk of exposure due to occurrence of COVID in an institutional setting such as Nursing home, Group home or longterm. Date of COVID test: 11/10/21    Have you received COVID vaccine: Yes, Moses Mcmahan    When did symptoms first appear: 11/6/21    What symptoms are you having:     [] Fever     [x] Cough     [x] SOB     [] Headache     [x] Tiredness     [x] Muscle or body aches     [] Loss of taste or smell     [] Sore throat     [x] Congestion or runny nose, some chest congestion     [] Nausea or vomiting     [] Diarrhea     []      /67   Pulse 72   Temp 97.5 °F (36.4 °C) (Oral)   Resp 18   SpO2 95%     Pulse Oximetry: 95 %    Reviewed information on Regen-COV medication ( casirivimab and imdevimab) such as Emergency Use Authorization status, hypersensitivity drug and potential side effects such as fever, chills, headache, nausea, SOB, high or low BP, rapid or slow heart rate, chest discomfort or pain, weakness, confusion, wheezing, swelling of face, lips or throat, rash, hives, itching, feeling faint, dizziness, sweating. Patient given Fact sheet. Administered via: [x] Peripheral access    [] PICC access    [] Port access    Patient received Regen-COV 1200 mg (casirivimab 600 mg and imdevimab 600 mg) in 100 ml NS IVPB over 30 minutes.   Patient monitored for an hour after infusion    Vital signs done per protocol and are documented on flowsheet:    Patient monitored

## 2021-11-15 ENCOUNTER — OFFICE VISIT (OUTPATIENT)
Dept: FAMILY MEDICINE CLINIC | Age: 76
End: 2021-11-15
Payer: MEDICARE

## 2021-11-15 ENCOUNTER — NURSE ONLY (OUTPATIENT)
Dept: CARDIOLOGY CLINIC | Age: 76
End: 2021-11-15
Payer: MEDICARE

## 2021-11-15 VITALS — HEART RATE: 67 BPM | OXYGEN SATURATION: 98 % | TEMPERATURE: 96.5 F

## 2021-11-15 DIAGNOSIS — I25.5 ISCHEMIC CARDIOMYOPATHY: ICD-10-CM

## 2021-11-15 DIAGNOSIS — U07.1 ACUTE BRONCHITIS DUE TO 2019 NOVEL CORONAVIRUS: Primary | ICD-10-CM

## 2021-11-15 DIAGNOSIS — I50.22 CHRONIC SYSTOLIC CHF (CONGESTIVE HEART FAILURE), NYHA CLASS 3 (HCC): ICD-10-CM

## 2021-11-15 DIAGNOSIS — J45.41 MODERATE PERSISTENT ASTHMA WITH ACUTE EXACERBATION: ICD-10-CM

## 2021-11-15 DIAGNOSIS — I50.23 ACUTE ON CHRONIC SYSTOLIC CHF (CONGESTIVE HEART FAILURE) (HCC): ICD-10-CM

## 2021-11-15 DIAGNOSIS — Z95.810 ICD (IMPLANTABLE CARDIOVERTER-DEFIBRILLATOR), DUAL, IN SITU: ICD-10-CM

## 2021-11-15 DIAGNOSIS — J20.8 ACUTE BRONCHITIS DUE TO 2019 NOVEL CORONAVIRUS: Primary | ICD-10-CM

## 2021-11-15 PROCEDURE — 99213 OFFICE O/P EST LOW 20 MIN: CPT | Performed by: FAMILY MEDICINE

## 2021-11-15 RX ORDER — IPRATROPIUM BROMIDE AND ALBUTEROL SULFATE 2.5; .5 MG/3ML; MG/3ML
1 SOLUTION RESPIRATORY (INHALATION) EVERY 4 HOURS PRN
Qty: 120 EACH | Refills: 0 | Status: SHIPPED | OUTPATIENT
Start: 2021-11-15

## 2021-11-15 ASSESSMENT — PATIENT HEALTH QUESTIONNAIRE - PHQ9
SUM OF ALL RESPONSES TO PHQ QUESTIONS 1-9: 0
2. FEELING DOWN, DEPRESSED OR HOPELESS: 0
SUM OF ALL RESPONSES TO PHQ QUESTIONS 1-9: 0
SUM OF ALL RESPONSES TO PHQ9 QUESTIONS 1 & 2: 0
SUM OF ALL RESPONSES TO PHQ QUESTIONS 1-9: 0
1. LITTLE INTEREST OR PLEASURE IN DOING THINGS: 0

## 2021-11-15 NOTE — PROGRESS NOTES
2019 novel coronavirus    Moderate persistent asthma with acute exacerbation    Other orders  -     ipratropium-albuterol (DUONEB) 0.5-2.5 (3) MG/3ML SOLN nebulizer solution; Inhale 3 mLs into the lungs every 4 hours as needed for Shortness of Breath    Prednisone 10 MG-directions to take 2 tablets 3 times a day for 4 days and then 1 tablet 3 times a day for 4 days and then stop the medication        Plan:      Advised patient states she still has Covid infection and we discussed that she would need to continue with quarantining until her symptoms are resolved +3 days. Patient already has prednisone medication at home and was advised how to take the prednisone medication as listed above. She was changed from albuterol to DuoNebs and instructed to do these at least 4 times a day. Continue with Mucinex medication    RTC as needed    Medical decision making of low complexity        Please note that this chart was generated using Dragon dictation software. Although every effort was made to ensure the accuracy of this automated transcription, some errors in transcription may have occurred.               Bettyjo Gottron, MD

## 2021-11-16 PROCEDURE — G2066 INTER DEVC REMOTE 30D: HCPCS | Performed by: CLINICAL NURSE SPECIALIST

## 2021-11-16 PROCEDURE — 93297 REM INTERROG DEV EVAL ICPMS: CPT | Performed by: CLINICAL NURSE SPECIALIST

## 2021-11-16 NOTE — PROGRESS NOTES
We received remote transmission from patient's dual chamber ICD monitor at home. Transmission shows normal sensing and pacing function. No new arrhythmias/events recorded. Optivol is within normal range. NP will review. See interrogation under cardiology tab in the [address] [place] [address] [address] field for more details. Will continue to monitor remotely.

## 2021-11-17 ENCOUNTER — TELEPHONE (OUTPATIENT)
Dept: FAMILY MEDICINE CLINIC | Age: 76
End: 2021-11-17

## 2021-11-17 NOTE — TELEPHONE ENCOUNTER
Patient's  is calling because he states that the patient has thrush. He said that whenever she take prednisone she gets it and  sends her a prescription for NYSTATIN. They would like to know if he can send her over some to her pharmacy.      Sterling William in chart

## 2021-11-19 ENCOUNTER — TELEPHONE (OUTPATIENT)
Dept: FAMILY MEDICINE CLINIC | Age: 76
End: 2021-11-19

## 2021-11-19 ENCOUNTER — HOSPITAL ENCOUNTER (OUTPATIENT)
Age: 76
Discharge: HOME | End: 2021-11-19
Payer: MEDICARE

## 2021-11-19 DIAGNOSIS — E11.65 TYPE 2 DIABETES MELLITUS WITH HYPERGLYCEMIA, WITH LONG-TERM CURRENT USE OF INSULIN (HCC): ICD-10-CM

## 2021-11-19 DIAGNOSIS — Z79.4 TYPE 2 DIABETES MELLITUS WITH HYPERGLYCEMIA, WITH LONG-TERM CURRENT USE OF INSULIN (HCC): ICD-10-CM

## 2021-11-19 LAB
A/G RATIO: 1.3 (ref 1.1–2.2)
ALBUMIN SERPL-MCNC: 3.7 G/DL (ref 3.4–5)
ALP BLD-CCNC: 105 U/L (ref 40–129)
ALT SERPL-CCNC: 16 U/L (ref 10–40)
ANION GAP SERPL CALCULATED.3IONS-SCNC: 15 MMOL/L (ref 3–16)
AST SERPL-CCNC: 17 U/L (ref 15–37)
BILIRUB SERPL-MCNC: 0.4 MG/DL (ref 0–1)
BUN BLDV-MCNC: 53 MG/DL (ref 7–20)
CALCIUM SERPL-MCNC: 8.5 MG/DL (ref 8.3–10.6)
CHLORIDE BLD-SCNC: 102 MMOL/L (ref 99–110)
CHOLESTEROL, TOTAL: 162 MG/DL (ref 0–199)
CO2: 26 MMOL/L (ref 21–32)
CREAT SERPL-MCNC: 1.9 MG/DL (ref 0.6–1.2)
GFR AFRICAN AMERICAN: 31
GFR NON-AFRICAN AMERICAN: 26
GLUCOSE BLD-MCNC: 223 MG/DL (ref 70–99)
HDLC SERPL-MCNC: 54 MG/DL (ref 40–60)
LDL CHOLESTEROL CALCULATED: 90 MG/DL
POTASSIUM SERPL-SCNC: 4.3 MMOL/L (ref 3.5–5.1)
SODIUM BLD-SCNC: 143 MMOL/L (ref 136–145)
TOTAL PROTEIN: 6.6 G/DL (ref 6.4–8.2)
TRIGL SERPL-MCNC: 89 MG/DL (ref 0–150)
VLDLC SERPL CALC-MCNC: 18 MG/DL

## 2021-11-19 PROCEDURE — 80061 LIPID PANEL: CPT

## 2021-11-19 PROCEDURE — 36415 COLL VENOUS BLD VENIPUNCTURE: CPT

## 2021-11-19 PROCEDURE — 80053 COMPREHEN METABOLIC PANEL: CPT

## 2021-11-19 PROCEDURE — 83036 HEMOGLOBIN GLYCOSYLATED A1C: CPT

## 2021-11-19 RX ORDER — ALBUTEROL SULFATE 90 UG/1
AEROSOL, METERED RESPIRATORY (INHALATION)
Qty: 25.5 G | Refills: 2 | Status: SHIPPED | OUTPATIENT
Start: 2021-11-19

## 2021-11-19 NOTE — TELEPHONE ENCOUNTER
Self report INR was 4.1. per WM pt is to hold coumadin and check her levels on Tuesday and call to report

## 2021-11-20 LAB
ESTIMATED AVERAGE GLUCOSE: 128.4 MG/DL
HBA1C MFR BLD: 6.1 %

## 2021-11-22 ENCOUNTER — OFFICE VISIT (OUTPATIENT)
Dept: FAMILY MEDICINE CLINIC | Age: 76
End: 2021-11-22
Payer: MEDICARE

## 2021-11-22 VITALS
BODY MASS INDEX: 32.97 KG/M2 | RESPIRATION RATE: 16 BRPM | HEART RATE: 72 BPM | OXYGEN SATURATION: 97 % | TEMPERATURE: 96.8 F | SYSTOLIC BLOOD PRESSURE: 104 MMHG | WEIGHT: 204.25 LBS | DIASTOLIC BLOOD PRESSURE: 64 MMHG

## 2021-11-22 DIAGNOSIS — E11.22 TYPE 2 DIABETES MELLITUS WITH STAGE 3B CHRONIC KIDNEY DISEASE, WITH LONG-TERM CURRENT USE OF INSULIN (HCC): Primary | ICD-10-CM

## 2021-11-22 DIAGNOSIS — N18.32 TYPE 2 DIABETES MELLITUS WITH STAGE 3B CHRONIC KIDNEY DISEASE, WITH LONG-TERM CURRENT USE OF INSULIN (HCC): Primary | ICD-10-CM

## 2021-11-22 DIAGNOSIS — Z79.4 TYPE 2 DIABETES MELLITUS WITH STAGE 3B CHRONIC KIDNEY DISEASE, WITH LONG-TERM CURRENT USE OF INSULIN (HCC): Primary | ICD-10-CM

## 2021-11-22 DIAGNOSIS — M17.0 PRIMARY OSTEOARTHRITIS OF BOTH KNEES: ICD-10-CM

## 2021-11-22 DIAGNOSIS — N18.4 STAGE 4 CHRONIC KIDNEY DISEASE (HCC): ICD-10-CM

## 2021-11-22 DIAGNOSIS — I48.19 PERSISTENT ATRIAL FIBRILLATION (HCC): ICD-10-CM

## 2021-11-22 DIAGNOSIS — E03.9 ACQUIRED HYPOTHYROIDISM: ICD-10-CM

## 2021-11-22 DIAGNOSIS — D68.69 HYPERCOAGULABLE STATE, SECONDARY (HCC): ICD-10-CM

## 2021-11-22 PROCEDURE — 99214 OFFICE O/P EST MOD 30 MIN: CPT | Performed by: FAMILY MEDICINE

## 2021-11-22 RX ORDER — LEVOTHYROXINE SODIUM 0.05 MG/1
50 TABLET ORAL DAILY
Qty: 90 TABLET | Refills: 1 | Status: SHIPPED | OUTPATIENT
Start: 2021-11-22 | End: 2021-12-06

## 2021-11-22 RX ORDER — OXYCODONE HYDROCHLORIDE 5 MG/1
5 TABLET ORAL 3 TIMES DAILY PRN
Qty: 90 TABLET | Refills: 0 | Status: SHIPPED | OUTPATIENT
Start: 2021-11-22 | End: 2022-04-04 | Stop reason: SDUPTHER

## 2021-11-22 RX ORDER — INSULIN GLARGINE 100 [IU]/ML
INJECTION, SOLUTION SUBCUTANEOUS
Qty: 90 ML | Refills: 1 | Status: SHIPPED | OUTPATIENT
Start: 2021-11-22

## 2021-11-22 RX ORDER — INSULIN LISPRO 100 [IU]/ML
INJECTION, SOLUTION INTRAVENOUS; SUBCUTANEOUS
Qty: 15 ML | Refills: 1 | Status: SHIPPED | OUTPATIENT
Start: 2021-11-22 | End: 2022-04-29 | Stop reason: SDUPTHER

## 2021-11-22 NOTE — PROGRESS NOTES
tablet by mouth daily 30 tablet 2    torsemide (DEMADEX) 20 MG tablet Take 2 tablets by mouth 2 times daily 360 tablet 1    midodrine (PROAMATINE) 2.5 MG tablet TAKE ONE TABLET BY MOUTH TWICE A DAY 60 tablet 5    FreeStyle Lancets MISC 1 each by Does not apply route 4 times daily 200 each 5    Blood Glucose Monitoring Suppl (FREESTYLE LITE) GAETANO 1 Device by Does not apply route 4 times daily      oxyCODONE (ROXICODONE) 5 MG immediate release tablet Take 0.5 mg by mouth daily.       insulin glargine (LANTUS SOLOSTAR) 100 UNIT/ML injection pen INJECT 22 UNITS IN THE MORNING 90 mL 1    insulin lispro, 1 Unit Dial, (HUMALOG KWIKPEN) 100 UNIT/ML SOPN Sliding scale-140-199-2 units, 200-249- 3 units, 250-300-4 units, >300 take 5 units 15 mL 0    montelukast (SINGULAIR) 10 MG tablet TAKE 1 TABLET NIGHTLY 90 tablet 3    potassium chloride (KLOR-CON M) 10 MEQ extended release tablet TAKE 1 TABLET DAILY 90 tablet 3    warfarin (COUMADIN) 5 MG tablet TAKE 1 TABLET DAILY 100 tablet 3    FREESTYLE LITE strip USE TO TEST FOUR TIMES A  strip 3    Insulin Pen Needle (BD PEN NEEDLE JANNET U/F) 32G X 4 MM MISC USE 1 PEN NEEDLE FOUR TIMES A  each 3    magnesium oxide (MAG-OX) 400 MG tablet Take 1 tablet by mouth daily 90 tablet 3    ACETAMINOPHEN PO Take 500 mg by mouth every 6 hours as needed       albuterol (PROVENTIL) (2.5 MG/3ML) 0.083% nebulizer solution Take 3 mLs by nebulization every 6 hours as needed for Wheezing 120 each 3    polyethylene glycol (GLYCOLAX) 17 GM/SCOOP powder Take 17 g by mouth daily       omeprazole (PRILOSEC) 20 MG delayed release capsule Take 20 mg by mouth daily      ondansetron (ZOFRAN) 4 MG tablet Take 1 tablet by mouth 3 times daily as needed for Nausea or Vomiting 30 tablet 0    aspirin 81 MG chewable tablet Take 1 tablet by mouth daily 30 tablet 3    vitamin B-12 500 MCG tablet Take 1 tablet by mouth daily 30 tablet 3       Allergies   Allergen Reactions    Wndqryuj-Muyuknp-Vpplho [Fluocinolone] Shortness Of Breath    Ciprofloxacin Shortness Of Breath    Diovan [Valsartan] Shortness Of Breath    Flagyl [Metronidazole] Shortness Of Breath     Has taken diflucan at home 12/7/15    Metformin And Related [Metformin And Related] Shortness Of Breath    Benazepril      Other reaction(s): Not Recorded    Morphine      Bad reaction. \"makes her feel horrible\".  Saxagliptin      Other reaction(s): Not Recorded    Levaquin [Levofloxacin] Rash       Social History     Tobacco Use    Smoking status: Never Smoker    Smokeless tobacco: Never Used   Substance Use Topics    Alcohol use: No       /64 (Site: Left Upper Arm, Position: Sitting, Cuff Size: Large Adult)   Pulse 72   Temp 96.8 °F (36 °C) (Infrared)   Resp 16   Wt 204 lb 4 oz (92.6 kg)   SpO2 97%   BMI 32.97 kg/m²     Objective:   Physical Exam  Constitutional:       General: She is not in acute distress. Appearance: She is well-developed. Neck:      Vascular: No carotid bruit. Cardiovascular:      Rate and Rhythm: Normal rate and regular rhythm. Pulses:           Dorsalis pedis pulses are 2+ on the right side and 2+ on the left side. Posterior tibial pulses are 2+ on the right side and 2+ on the left side. Heart sounds: Murmur (Right sternal border) heard. Systolic murmur is present with a grade of 2/6. No friction rub. No gallop. Pulmonary:      Effort: Pulmonary effort is normal.      Breath sounds: Normal breath sounds. Abdominal:      General: Bowel sounds are increased. There is distension. Palpations: Abdomen is soft. Tenderness: There is abdominal tenderness in the epigastric area. There is no right CVA tenderness or left CVA tenderness. Musculoskeletal:         General: No tenderness. Normal range of motion. Right lower leg: No edema. Left lower leg: No edema. Right foot: Normal.      Left foot: Normal.   Skin:     Findings: No rash. Neurological:      Mental Status: She is alert and oriented to person, place, and time. Sensory: Sensation is intact. Motor: Motor function is intact. Deep Tendon Reflexes: Reflexes are normal and symmetric. Comments: 12 point monofilament test normal    Psychiatric:         Behavior: Behavior is cooperative. Assessment:      Carolina Trinidad was seen today for follow-up, diabetes and hypertension. Diagnoses and all orders for this visit:    Type 2 diabetes mellitus with stage 3b chronic kidney disease, with long-term current use of insulin (HCC)  -     Hemoglobin A1C; Future  -     Basic Metabolic Panel; Future    Primary osteoarthritis of both knees  -     oxyCODONE (ROXICODONE) 5 MG immediate release tablet; Take 1 tablet by mouth 3 times daily as needed for Pain for up to 30 days. Acquired hypothyroidism  -     TSH without Reflex; Future    Hypercoagulable state, secondary ([facility]    Persistent atrial fibrillation ([facility])    Stage 4 chronic kidney disease ([facility] [address]    Other orders  -     insulin glargine (LANTUS SOLOSTAR) 100 UNIT/ML injection pen; INJECT 20 UNITS IN THE MORNING  -     insulin lispro, 1 Unit Dial, (HUMALOG KWIKPEN) 100 UNIT/ML SOPN; Sliding scale-140-199-2 units, 200-249- 3 units, 250-300-4 units, >300 take 5 units  -     levothyroxine (SYNTHROID) 50 MCG tablet; Take 1 tablet by mouth daily    OARRS report checked          Plan:      Reviewed labs and test results with patient. Kidney function is relatively unchanged and recommended she maintain current medications and avoid anti-inflammatory medications and minimize salt in her diet.     INR is to be checked tomorrow and restart probable Coumadin management at normal dose    Continue with current diabetic management    Continue current thyroid management    Patient received counseling on the following healthy behaviors: nutrition and exercise     Patient given educational materials     Health maintenance updated    Discussed use, benefit, and side effects of prescribed medications. Barriers to medication compliance addressed. All patient questions answered. Pt voiced understanding. Patient needs RTC in 3 months. Medical decision making of moderate complexity. Please note that this chart was generated using Dragon dictation software. Although every effort was made to ensure the accuracy of this automated transcription, some errors in transcription may have occurred.

## 2021-11-23 ENCOUNTER — TELEPHONE (OUTPATIENT)
Dept: FAMILY MEDICINE CLINIC | Age: 76
End: 2021-11-23

## 2021-11-23 NOTE — TELEPHONE ENCOUNTER
Self-testing INR 1.2 . Per WM patient is to resume normal dose and recheck in 2 weeks.     Pt advise

## 2021-11-28 ENCOUNTER — TELEPHONE (OUTPATIENT)
Dept: FAMILY MEDICINE CLINIC | Age: 76
End: 2021-11-28

## 2021-11-28 DIAGNOSIS — J20.9 ACUTE BRONCHITIS DUE TO INFECTION: Primary | ICD-10-CM

## 2021-11-28 RX ORDER — PREDNISONE 20 MG/1
TABLET ORAL
Qty: 10 TABLET | Refills: 0 | Status: SHIPPED | OUTPATIENT
Start: 2021-11-28 | End: 2021-12-14 | Stop reason: ALTCHOICE

## 2021-11-28 RX ORDER — AZITHROMYCIN 250 MG/1
250 TABLET, FILM COATED ORAL SEE ADMIN INSTRUCTIONS
Qty: 6 TABLET | Refills: 0 | Status: SHIPPED | OUTPATIENT
Start: 2021-11-28 | End: 2021-12-03

## 2021-11-28 NOTE — TELEPHONE ENCOUNTER
Patient made phone call on [employer]. Per patient having concerns in regards to chest congestion/coughing, \"brochials feeling raw\". Was diagnosed with COVID by me on 11/10/21. Since then symptoms seem to come and go. She did receive monoclonal antibodies. Her Pulse Ox has remained in the 93-94 range which she has been monitoring closely. Her blood sugars have been fluctuating since she has been on and off prednisone. She feels this is different with the way she is feeling this morning. Did use her nebulizer this morning. Patient is on Coumadin with her last INR 2.7. I advised to start on prednisone 40mg, will add in Zithromax. Patient to decrease her coumadin dosing to 2.5mg daily and recheck her INR in one week. She does have a machine at home so she is going to check it now and call me back if it is not in range. Did advise patient if her Pulse Ox drops below 90 she needs to go to the ED. Patient agreeable. She does sound fairly SOB on the phone. Her daughter is a pharmacist - she feels comfortable at home at this point. Will call back with questions.

## 2021-11-29 ENCOUNTER — TELEPHONE (OUTPATIENT)
Dept: FAMILY MEDICINE CLINIC | Age: 76
End: 2021-11-29

## 2021-12-02 ENCOUNTER — TELEPHONE (OUTPATIENT)
Dept: FAMILY MEDICINE CLINIC | Age: 76
End: 2021-12-02

## 2021-12-02 NOTE — TELEPHONE ENCOUNTER
Patient is stating that she still doesn't feel good and would like to see if she can get another prescription called in for her.     Jeremy in chart       Please advise     Provider out of the office

## 2021-12-03 ENCOUNTER — HOSPITAL ENCOUNTER (OUTPATIENT)
Age: 76
Discharge: HOME | End: 2021-12-03
Payer: MEDICARE

## 2021-12-03 ENCOUNTER — OFFICE VISIT (OUTPATIENT)
Dept: FAMILY MEDICINE CLINIC | Age: 76
End: 2021-12-03
Payer: MEDICARE

## 2021-12-03 ENCOUNTER — HOSPITAL ENCOUNTER (OUTPATIENT)
Dept: GENERAL RADIOLOGY | Age: 76
Discharge: HOME | End: 2021-12-03
Payer: MEDICARE

## 2021-12-03 VITALS — TEMPERATURE: 96.7 F | HEART RATE: 82 BPM | OXYGEN SATURATION: 96 %

## 2021-12-03 DIAGNOSIS — R22.0 JAW SWELLING: ICD-10-CM

## 2021-12-03 DIAGNOSIS — E11.22 TYPE 2 DIABETES MELLITUS WITH STAGE 3B CHRONIC KIDNEY DISEASE, WITH LONG-TERM CURRENT USE OF INSULIN (HCC): ICD-10-CM

## 2021-12-03 DIAGNOSIS — J40 BRONCHITIS: Primary | ICD-10-CM

## 2021-12-03 DIAGNOSIS — R06.02 SOB (SHORTNESS OF BREATH): ICD-10-CM

## 2021-12-03 DIAGNOSIS — E03.9 ACQUIRED HYPOTHYROIDISM: ICD-10-CM

## 2021-12-03 DIAGNOSIS — Z79.4 TYPE 2 DIABETES MELLITUS WITH STAGE 3B CHRONIC KIDNEY DISEASE, WITH LONG-TERM CURRENT USE OF INSULIN (HCC): ICD-10-CM

## 2021-12-03 DIAGNOSIS — J40 BRONCHITIS: ICD-10-CM

## 2021-12-03 DIAGNOSIS — I50.23 ACUTE ON CHRONIC SYSTOLIC CHF (CONGESTIVE HEART FAILURE) (HCC): ICD-10-CM

## 2021-12-03 DIAGNOSIS — N18.32 TYPE 2 DIABETES MELLITUS WITH STAGE 3B CHRONIC KIDNEY DISEASE, WITH LONG-TERM CURRENT USE OF INSULIN (HCC): ICD-10-CM

## 2021-12-03 LAB
ANION GAP SERPL CALCULATED.3IONS-SCNC: 14 MMOL/L (ref 3–16)
BUN BLDV-MCNC: 46 MG/DL (ref 7–20)
CALCIUM SERPL-MCNC: 8.6 MG/DL (ref 8.3–10.6)
CHLORIDE BLD-SCNC: 96 MMOL/L (ref 99–110)
CO2: 29 MMOL/L (ref 21–32)
CREAT SERPL-MCNC: 1.8 MG/DL (ref 0.6–1.2)
GFR AFRICAN AMERICAN: 33
GFR NON-AFRICAN AMERICAN: 27
GLUCOSE BLD-MCNC: 252 MG/DL (ref 70–99)
POTASSIUM SERPL-SCNC: 3.9 MMOL/L (ref 3.5–5.1)
PRO-BNP: 2612 PG/ML (ref 0–449)
SODIUM BLD-SCNC: 139 MMOL/L (ref 136–145)
TSH SERPL DL<=0.05 MIU/L-ACNC: 5.67 UIU/ML (ref 0.27–4.2)

## 2021-12-03 PROCEDURE — 36415 COLL VENOUS BLD VENIPUNCTURE: CPT

## 2021-12-03 PROCEDURE — 71046 X-RAY EXAM CHEST 2 VIEWS: CPT

## 2021-12-03 PROCEDURE — 99214 OFFICE O/P EST MOD 30 MIN: CPT | Performed by: FAMILY MEDICINE

## 2021-12-03 PROCEDURE — 83880 ASSAY OF NATRIURETIC PEPTIDE: CPT

## 2021-12-03 PROCEDURE — 80048 BASIC METABOLIC PNL TOTAL CA: CPT

## 2021-12-03 PROCEDURE — 83036 HEMOGLOBIN GLYCOSYLATED A1C: CPT

## 2021-12-03 PROCEDURE — 84443 ASSAY THYROID STIM HORMONE: CPT

## 2021-12-03 RX ORDER — PREDNISONE 20 MG/1
60 TABLET ORAL DAILY
Qty: 15 TABLET | Refills: 0 | Status: SHIPPED | OUTPATIENT
Start: 2021-12-03 | End: 2021-12-08

## 2021-12-03 RX ORDER — AMOXICILLIN AND CLAVULANATE POTASSIUM 875; 125 MG/1; MG/1
1 TABLET, FILM COATED ORAL 2 TIMES DAILY
Qty: 20 TABLET | Refills: 0 | Status: SHIPPED | OUTPATIENT
Start: 2021-12-03 | End: 2021-12-13

## 2021-12-03 NOTE — PROGRESS NOTES
Chief Complaint   Patient presents with    Cough         Had covid early November. Got antibodies. States has had persistant cough, was getting stuff up but not now. No fever. rattinling in chest. + sob - worse than her baseline. Checking oxygen at home and runs ok. Gets swelling in feet at times but that is normal. Weighs herself everyday and has been stable. States feels lump on left jaw, noted a week ago, went down some then got big again. States has had before and got abx and went away. Very tender to touch.    + vax x 2    States took last of steroids and zpak yesterday - states thought little better than started all over again. Has been using duonebs  And albuterol MDI and humidifier with vicks and mucinex. Vitals:    12/03/21 1417   Pulse: 82   Temp: 96.7 °F (35.9 °C)   SpO2: 96%     Wt Readings from Last 3 Encounters:   11/22/21 204 lb 4 oz (92.6 kg)   11/02/21 208 lb (94.3 kg)   08/24/21 201 lb (91.2 kg)     There is no height or weight on file to calculate BMI. Alert and oriented x 4 NAD, affect appropriate and obese, well hydrated, well developed. Audible rattling with breathing  + tender swelling under left jaw line   Lung  Exp wheezes and rhonchi thru out  Trace edema legs bilaterally      ASSESSMENT AND PLAN:       Eric Tolbert was seen today for cough. Diagnoses and all orders for this visit:    Bronchitis  -     XR CHEST STANDARD (2 VW); Future    SOB (shortness of breath)  -     XR CHEST STANDARD (2 VW); Future    Acute on chronic systolic CHF (congestive heart failure) (HCC)  -     BRAIN NATRIURETIC PEPTIDE (BNP); Future    Jaw swelling    Other orders  -     amoxicillin-clavulanate (AUGMENTIN) 875-125 MG per tablet; Take 1 tablet by mouth 2 times daily for 10 days  -     predniSONE (DELTASONE) 20 MG tablet;  Take 3 tablets by mouth daily for 5 days        Get labs and CXR  abx and steroids  If worsening sx needs to go to ED

## 2021-12-04 LAB
ESTIMATED AVERAGE GLUCOSE: 154.2 MG/DL
HBA1C MFR BLD: 7 %

## 2021-12-06 ENCOUNTER — TELEPHONE (OUTPATIENT)
Dept: FAMILY MEDICINE CLINIC | Age: 76
End: 2021-12-06

## 2021-12-06 ENCOUNTER — ANTI-COAG VISIT (OUTPATIENT)
Dept: FAMILY MEDICINE CLINIC | Age: 76
End: 2021-12-06

## 2021-12-06 DIAGNOSIS — E11.22 TYPE 2 DIABETES MELLITUS WITH STAGE 3B CHRONIC KIDNEY DISEASE, WITH LONG-TERM CURRENT USE OF INSULIN (HCC): Primary | ICD-10-CM

## 2021-12-06 DIAGNOSIS — E03.9 ACQUIRED HYPOTHYROIDISM: ICD-10-CM

## 2021-12-06 DIAGNOSIS — Z79.4 TYPE 2 DIABETES MELLITUS WITH STAGE 3B CHRONIC KIDNEY DISEASE, WITH LONG-TERM CURRENT USE OF INSULIN (HCC): Primary | ICD-10-CM

## 2021-12-06 DIAGNOSIS — N18.32 TYPE 2 DIABETES MELLITUS WITH STAGE 3B CHRONIC KIDNEY DISEASE, WITH LONG-TERM CURRENT USE OF INSULIN (HCC): Primary | ICD-10-CM

## 2021-12-06 LAB — INR BLD: 2.2

## 2021-12-06 RX ORDER — FLUCONAZOLE 100 MG/1
100 TABLET ORAL DAILY
Qty: 7 TABLET | Refills: 0 | Status: SHIPPED | OUTPATIENT
Start: 2021-12-06 | End: 2022-05-06 | Stop reason: SDUPTHER

## 2021-12-06 RX ORDER — LEVOTHYROXINE SODIUM 0.07 MG/1
75 TABLET ORAL DAILY
Qty: 90 TABLET | Refills: 1 | Status: SHIPPED | OUTPATIENT
Start: 2021-12-06 | End: 2022-02-22

## 2021-12-06 NOTE — TELEPHONE ENCOUNTER
fluconazole (DIFLUCAN) 150 MG tablet [[id number]]  ENDED    [address], [address] [phone] Claude Piña [phone] [address] [facility], [facility], [address] [place] [id number]   Phone:  [phone]  Fax:  [phone]      Patient called and would like this called in for a yeast infection

## 2021-12-06 NOTE — TELEPHONE ENCOUNTER
Patient is still having shortness of breath and rattling. She states it might be slightly better but still having issues.  Please advise

## 2021-12-06 NOTE — TELEPHONE ENCOUNTER
----- Message from Alison Perez MD sent at 12/6/2021  8:40 AM EST -----  Laboratory testing demonstrates that her thyroid is still low. Would recommend she take a pill and a half of her thyroid medication and a new prescription for 75 mg was sent to the pharmacy. This should be reassessed in the next 2 months with a TSH. Diabetes management overall is still well controlled. Other blood test for congestive heart failure demonstrates she does have congestive heart failure. How is her breathing doing at this time compared to when she saw  on Friday? ?  If she still having respiratory difficulties we will probably need to increase water pill strength? ?

## 2021-12-06 NOTE — TELEPHONE ENCOUNTER
Recommend that she increase Demadex to 3 pills in the morning and 2 pills in the afternoon for the next 4 days.

## 2021-12-09 ENCOUNTER — OFFICE VISIT (OUTPATIENT)
Dept: CARDIOLOGY CLINIC | Age: 76
End: 2021-12-09
Payer: MEDICARE

## 2021-12-09 ENCOUNTER — HOSPITAL ENCOUNTER (OUTPATIENT)
Age: 76
Discharge: HOME | End: 2021-12-09
Payer: MEDICARE

## 2021-12-09 VITALS
DIASTOLIC BLOOD PRESSURE: 60 MMHG | HEART RATE: 84 BPM | WEIGHT: 207 LBS | BODY MASS INDEX: 33.27 KG/M2 | SYSTOLIC BLOOD PRESSURE: 100 MMHG | OXYGEN SATURATION: 98 % | HEIGHT: 66 IN

## 2021-12-09 DIAGNOSIS — I48.0 PAF (PAROXYSMAL ATRIAL FIBRILLATION) (HCC): ICD-10-CM

## 2021-12-09 DIAGNOSIS — I35.0 NONRHEUMATIC AORTIC VALVE STENOSIS: ICD-10-CM

## 2021-12-09 DIAGNOSIS — I50.22 CHRONIC SYSTOLIC CHF (CONGESTIVE HEART FAILURE), NYHA CLASS 3 (HCC): Primary | ICD-10-CM

## 2021-12-09 DIAGNOSIS — I50.22 CHRONIC SYSTOLIC CHF (CONGESTIVE HEART FAILURE), NYHA CLASS 3 (HCC): ICD-10-CM

## 2021-12-09 DIAGNOSIS — E86.0 DEHYDRATION: ICD-10-CM

## 2021-12-09 DIAGNOSIS — Z95.1 S/P CABG X 3: ICD-10-CM

## 2021-12-09 LAB
ANION GAP SERPL CALCULATED.3IONS-SCNC: 14 MMOL/L (ref 3–16)
BUN BLDV-MCNC: 58 MG/DL (ref 7–20)
CALCIUM SERPL-MCNC: 8.4 MG/DL (ref 8.3–10.6)
CHLORIDE BLD-SCNC: 93 MMOL/L (ref 99–110)
CO2: 29 MMOL/L (ref 21–32)
CREAT SERPL-MCNC: 1.8 MG/DL (ref 0.6–1.2)
GFR AFRICAN AMERICAN: 33
GFR NON-AFRICAN AMERICAN: 27
GLUCOSE BLD-MCNC: 96 MG/DL (ref 70–99)
HCT VFR BLD CALC: 37.9 % (ref 36–48)
HEMOGLOBIN: 12.5 G/DL (ref 12–16)
MCH RBC QN AUTO: 29.6 PG (ref 26–34)
MCHC RBC AUTO-ENTMCNC: 32.8 G/DL (ref 31–36)
MCV RBC AUTO: 90.3 FL (ref 80–100)
PDW BLD-RTO: 15 % (ref 12.4–15.4)
PLATELET # BLD: 215 K/UL (ref 135–450)
PMV BLD AUTO: 9.3 FL (ref 5–10.5)
POTASSIUM SERPL-SCNC: 4.1 MMOL/L (ref 3.5–5.1)
PRO-BNP: 2953 PG/ML (ref 0–449)
RBC # BLD: 4.2 M/UL (ref 4–5.2)
SODIUM BLD-SCNC: 136 MMOL/L (ref 136–145)
TSH SERPL DL<=0.05 MIU/L-ACNC: 4.07 UIU/ML (ref 0.27–4.2)
WBC # BLD: 11.2 K/UL (ref 4–11)

## 2021-12-09 PROCEDURE — 99214 OFFICE O/P EST MOD 30 MIN: CPT | Performed by: CLINICAL NURSE SPECIALIST

## 2021-12-09 PROCEDURE — 85027 COMPLETE CBC AUTOMATED: CPT

## 2021-12-09 PROCEDURE — 84443 ASSAY THYROID STIM HORMONE: CPT

## 2021-12-09 PROCEDURE — 83880 ASSAY OF NATRIURETIC PEPTIDE: CPT

## 2021-12-09 PROCEDURE — 80048 BASIC METABOLIC PNL TOTAL CA: CPT

## 2021-12-09 PROCEDURE — 36415 COLL VENOUS BLD VENIPUNCTURE: CPT

## 2021-12-09 NOTE — PATIENT INSTRUCTIONS
1.  Hold losartan today and tomorrow  2. Drink a little more fluids today and tomorrow  3. Hold torsemide today and tomorrow  4. Resume losartan and torsemide on Saturday  5. Check blood work today  6.   RTO in 2 months

## 2021-12-09 NOTE — PROGRESS NOTES
[facility] ([facility]  Progress Note    Primary Care Doctor:  Odilon Chowdary MD    Chief Complaint   Patient presents with    Follow-up    Congestive Heart Failure        History of Present Illness:  77 y.o. female with history of CAD/CABG in 2018, PAF with maze 2018, HTN, HLD, DVT/PE on coumadin, 2 CVs unsuccessful  -3/10/21 for small bowel pneumatosis with loculated pneumoperitoneum (IV zoysn and TPN), acute on chronic sHF (torsemide decreased at discharge, aldactone was held and coreg dose decreased due to hypotension), TORIBIO on CKD, PAF  ICD placed 2021    I had the pleasure of seeing Yulisa Knight in follow up for sHF. She is in a wheel chair with Imelda Henderson (). She is recovering from covid (antibodies, steroids and antibiotics). She was on prednisone  to  (off for 2 days). She continues to cough some thick green secretions, no fevers and recent cxr normal.  Today, her BP was low at home and she took a midodrine. Her optival shows that she is very dry. She has some rhonchi on the left side and bilateral lower ankle edema. Her weight at home is staying 199-203. Past Medical History:   has a past medical history of Asthma, Atrial fibrillation ([address] Eosinophilia, Hemoptysis, HIGH CHOLESTEROL, Hx of blood clots, Hypertension, Irregular heart beat, Other specified gastritis without mention of hemorrhage, Palpitations, Skin cancer, and Type II or unspecified type diabetes mellitus without mention of complication, not stated as uncontrolled. Surgical History:   has a past surgical history that includes Cholecystectomy;  section; Colonoscopy (2017); skin biopsy; bronchoscopy (2016); Coronary artery bypass graft (2018); Mitral valve replacement (2018); transesophageal echocardiogram (2018); Tunneled venous catheter placement (Left, 2018); Cardiac catheterization (2018); Insertable Cardiac Monitor (Left, 2018);  Colonoscopy (01/17/2014); Hysterectomy; Upper gastrointestinal endoscopy (N/A, 10/10/2018); Colonoscopy (10/10/2018); Colonoscopy (N/A, 8/7/2020); Pain management procedure (N/A, 12/18/2020); Pain management procedure (Bilateral, 1/18/2021); and Cardiac catheterization (5/2 and 5/3 2021). Social History:   reports that she has never smoked. She has never used smokeless tobacco. She reports that she does not drink alcohol and does not use drugs. Family History:   Family History   Problem Relation Age of Onset    Cancer Father     Asthma Mother     Hypertension Mother     Heart Disease Mother     High Blood Pressure Mother        Home Medications:  Prior to Admission medications    Medication Sig Start Date End Date Taking? Authorizing Provider   levothyroxine (SYNTHROID) 75 MCG tablet Take 1 tablet by mouth daily 12/6/21  Yes Balbina Blanton MD   fluconazole (DIFLUCAN) 100 MG tablet Take 1 tablet by mouth daily for 7 days Take 5 mg coumadin when taking diflucan 12/6/21 12/13/21 Yes Balbina Blanton MD   amoxicillin-clavulanate (AUGMENTIN) 875-125 MG per tablet Take 1 tablet by mouth 2 times daily for 10 days 12/3/21 12/13/21 Yes Shruti Smith MD   oxyCODONE (ROXICODONE) 5 MG immediate release tablet Take 1 tablet by mouth 3 times daily as needed for Pain for up to 30 days.  11/22/21 12/22/21 Yes Balbina Blanton MD   insulin glargine (LANTUS SOLOSTAR) 100 UNIT/ML injection pen INJECT 20 UNITS IN THE MORNING 11/22/21  Yes Balbina Blanton MD   insulin lispro, 1 Unit Dial, (HUMALOG KWIKPEN) 100 UNIT/ML SOPN Sliding scale-140-199-2 units, 200-249- 3 units, 250-300-4 units, >300 take 5 units 11/22/21  Yes Balbina Blanton MD   albuterol sulfate  (90 Base) MCG/ACT inhaler USE 2 INHALATIONS EVERY 6 HOURS AS NEEDED FOR WHEEZING 11/19/21  Yes Balbina Blanton MD   nystatin (MYCOSTATIN) 692543 UNIT/ML suspension TAKE ONE TEASPOONFUL BY MOUTH FOUR TIMES A DAY FOR 5 DAYS 11/17/21  Yes Balbina Blanton MD ipratropium-albuterol (DUONEB) 0.5-2.5 (3) MG/3ML SOLN nebulizer solution Inhale 3 mLs into the lungs every 4 hours as needed for Shortness of Breath 11/15/21  Yes Manford Denver, MD   amiodarone (CORDARONE) 200 MG tablet Take 0.5 tablets by mouth daily 11/2/21  Yes Minor Habermann, APRN - CNP   losartan (COZAAR) 25 MG tablet TAKE 1 TABLET DAILY 10/18/21  Yes SOLEDAD De Guzman CNP   metoprolol succinate (TOPROL XL) 50 MG extended release tablet TAKE 1 TABLET DAILY 10/18/21  Yes SOLEDAD De Guzman CNP   spironolactone (ALDACTONE) 25 MG tablet Take 1 tablet by mouth 2 times daily 10/6/21  Yes SOLEDAD Huang   vitamin D (ERGOCALCIFEROL) 1.25 MG (97191 UT) CAPS capsule Take 1 capsule by mouth once a week 10/5/21  Yes SOLEDAD Huang   torsemide (DEMADEX) 20 MG tablet Take 2 tablets by mouth 2 times daily 9/20/21  Yes Manford Denver, MD   midodrine (PROAMATINE) 2.5 MG tablet TAKE ONE TABLET BY MOUTH TWICE A DAY 9/16/21  Yes SOLEDAD Tse   FreeStyle Lancets MISC 1 each by Does not apply route 4 times daily 9/7/21  Yes Manford Denver, MD   Blood Glucose Monitoring Suppl (FREESTYLE LITE) GAETANO 1 Device by Does not apply route 4 times daily   Yes Historical Provider, MD   oxyCODONE (ROXICODONE) 5 MG immediate release tablet Take 0.5 mg by mouth daily.    Yes Historical Provider, MD   montelukast (SINGULAIR) 10 MG tablet TAKE 1 TABLET NIGHTLY 8/3/21  Yes Manford Denver, MD   potassium chloride (KLOR-CON M) 10 MEQ extended release tablet TAKE 1 TABLET DAILY 8/3/21  Yes Manford Denver, MD   warfarin (COUMADIN) 5 MG tablet TAKE 1 TABLET DAILY 8/3/21  Yes Manford Denver, MD   FREESTYLE LITE strip USE TO TEST FOUR TIMES A DAY 8/2/21  Yes Manford Denver, MD   Insulin Pen Needle (BD PEN NEEDLE JANNET U/F) 32G X 4 MM MISC USE 1 PEN NEEDLE FOUR TIMES A DAY 6/14/21  Yes Manford Denver, MD   magnesium oxide (MAG-OX) 400 MG tablet Take 1 tablet by mouth daily 5/10/21  Yes Kristine Quarles MD   ACETAMINOPHEN PO Take 500 mg by mouth every 6 hours as needed    Yes Historical Provider, MD   albuterol (PROVENTIL) (2.5 MG/3ML) 0.083% nebulizer solution Take 3 mLs by nebulization every 6 hours as needed for Wheezing 6/2/20  Yes Bettyjo Gottron, MD   polyethylene glycol (GLYCOLAX) 17 GM/SCOOP powder Take 17 g by mouth daily    Yes Historical Provider, MD   omeprazole (PRILOSEC) 20 MG delayed release capsule Take 20 mg by mouth daily   Yes Historical Provider, MD   ondansetron (ZOFRAN) 4 MG tablet Take 1 tablet by mouth 3 times daily as needed for Nausea or Vomiting 3/26/20  Yes Bettyjo Gottron, MD   aspirin 81 MG chewable tablet Take 1 tablet by mouth daily 6/7/19  Yes Bettyjo Gottron, MD   vitamin B-12 500 MCG tablet Take 1 tablet by mouth daily 10/12/18  Yes Jomar Davis MD   predniSONE (DELTASONE) 20 MG tablet 2 tabs once daily  Patient not taking: Reported on 12/9/2021 11/28/21   Dominique Litten, APRN - CNP        Allergies:  [id number] [fluocinolone], Ciprofloxacin, Diovan [valsartan], Flagyl [metronidazole], Metformin and related [metformin and related], Benazepril, Morphine, Saxagliptin, and Levaquin [levofloxacin]     Review of Systems:   · Constitutional: there has been no unanticipated weight loss. There's been no change in energy level, sleep pattern, or activity level. · Eyes: No visual changes or diplopia. No scleral icterus. · ENT: No Headaches, hearing loss or vertigo. No mouth sores or sore throat. · Cardiovascular: Reviewed in HPI  · Respiratory: No cough or wheezing, no sputum production. No hematemesis. · Gastrointestinal: No abdominal pain, appetite loss, blood in stools. No change in bowel or bladder habits. · Genitourinary: No dysuria, trouble voiding, or hematuria. · Musculoskeletal:  No gait disturbance, weakness or joint complaints. · Integumentary: No rash or pruritis.   · Neurological: No headache, diplopia, change in muscle strength, numbness or tingling. No change in gait, balance, coordination, mood, affect, memory, mentation, behavior. · Psychiatric: No anxiety, no depression. · Endocrine: No malaise, fatigue or temperature intolerance. No excessive thirst, fluid intake, or urination. No tremor. · Hematologic/Lymphatic: No abnormal bruising or bleeding, blood clots or swollen lymph nodes. · Allergic/Immunologic: No nasal congestion or hives. Physical Examination:    Vitals:    12/09/21 0823   BP: 100/60   Site: Left Upper Arm   Position: Sitting   Cuff Size: Large Adult   Pulse: 84   SpO2: 98%   Weight: 207 lb (93.9 kg)   Height: 5' 6\" (1.676 m)        Constitutional and General Appearance: Warm and dry, no apparent distress, normal coloration  HEENT:  Normocephalic, atraumatic  Respiratory:  · Normal excursion and expansion without use of accessory muscles  · Resp Auscultation: Normal breath sounds without dullness  Cardiovascular:  · The apical impulses not displaced  · Heart tones are crisp and normal  · JVP normal cm H2O  · regular rate and rhythm  · Peripheral pulses are symmetrical and full  · There is no clubbing, cyanosis of the extremities.   · Bilateral lower ankle edema  · Pedal Pulses: 2+ and equal   Abdomen: distended + bowel sounds  · No masses or tenderness  · Liver/Spleen: No Abnormalities Noted  Neurological/Psychiatric:  · Alert and oriented in all spheres  · Moves all extremities well  · Exhibits normal gait balance and coordination  · No abnormalities of mood, affect, memory, mentation, or behavior are noted    Lab Data:    CBC:   Lab Results   Component Value Date    WBC 4.8 10/05/2021    WBC 8.2 08/13/2021    WBC 6.4 07/07/2021    RBC 3.95 10/05/2021    RBC 4.23 08/13/2021    RBC 4.08 07/07/2021    HGB 11.8 10/05/2021    HGB 12.5 08/13/2021    HGB 11.6 07/07/2021    HCT 36.2 10/05/2021    HCT 37.5 08/13/2021    HCT 35.3 07/07/2021    MCV 91.5 10/05/2021    MCV 88.5 08/13/2021    MCV 86.6 07/07/2021 RDW 15.6 10/05/2021    RDW 17.8 08/13/2021    RDW 18.9 07/07/2021     10/05/2021     08/13/2021     07/07/2021     BMP:  Lab Results   Component Value Date     12/03/2021     11/19/2021     10/05/2021    K 3.9 12/03/2021    K 4.3 11/19/2021    K 4.0 10/05/2021    K 3.5 04/21/2021    K 3.7 04/20/2021    K 4.0 04/19/2021    CL 96 12/03/2021     11/19/2021     10/05/2021    CO2 29 12/03/2021    CO2 26 11/19/2021    CO2 27 10/05/2021    PHOS 4.0 05/05/2021    PHOS 3.7 05/04/2021    PHOS 3.6 05/03/2021    BUN 46 12/03/2021    BUN 53 11/19/2021    BUN 38 10/05/2021    CREATININE 1.8 12/03/2021    CREATININE 1.9 11/19/2021    CREATININE 1.7 10/05/2021     BNP:   Lab Results   Component Value Date    PROBNP 2,612 12/03/2021    PROBNP 2,237 10/05/2021    PROBNP 2,800 08/27/2021     Cardiac Imaging:  [facility] Preliminary 5/4/2021 Dr Miguel Angel Brooks  AV - area ~ 1.5, opens adequately, not severe aortic stenosis     [facility] 5/3/2021 Dr Miguel Angel Brooks  Artery Findings/Result   LM Normal   LAD 50% mid, 70% mid, competitive flow distal from LIMA   Cx OM1 20% ostial to prox, superior branch mid OM1 50% ostial   RI N/A   S-D-RPL patent   L-LAD patent   RCA 50-60% distal, very heavily calcified   LVEDP 15   AV Peak to peak gradient 36mmHg, valve crossed easily with pigtail. LVG NA      Intervention:         None     Post Cath Dx:       Patent grafts     Echo 4/20/21  Summary   Overall left ventricular systolic function is severely depressed .   Ejection fraction is visually estimated to be 10-15 %.  E/e'= 23.2 GLS=-3.4   Moderately dilated left ventricle.   There is severe diffuse hypokinesis.   Indeterminate diastolic function.   A bioprosthetic mitral valve is well seated with peak velocity of 1.59m/s   and a mean gradient of 3mmHg.   The left atrium is moderately dilated.   Mild aortic stenosis with a peak velocity of 2.5m/s and a mean pressure   gradient of 14mmHg.   The aortic valve is thickened/calcified with decreased leaflet mobility   consistent with aortic stenosis.   Mild to moderate tricuspid regurgitation.   Estimated pulmonary artery systolic pressure is mildly to moderately   elevated at 46 mmHg assuming a right atrial pressure of 8 mmHg    11/30/2020 Dobutamine Echo   Dobutamine echocardiogram for aortic stenosis.   Very technically limited exam due to atrial fibrillation.   Baseline echocardiogram shows severe left ventricular dysfunction with   anterior, lateral and apical hypokinesis with an ejection fraction of 20-25%.   There is no improvement in wall motion with low or high dose dobutamine   suggestive of nonviable myocardium.      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 After dobutamine stress the mean AV   gradient rises to 20mmHg with 20mcg of dobutamine consistent with mild to   moderate aortic stenosis. Prosthetic mitral valve with a mean gradient of 7mmHg        [facility] 10/21/2020  Mildly dilated left ventricular size and normal wall thickness. Global left ventricular function is severely decreased with ejection fraction estimated at 25%. Patient is in atrial fibrillation during procedure.      The bioprosthetic mitral valve is well seated with a mean gradient of 5mmHg and maximum pressure gradient of 15 mmHg with heart rate of 89 bpm. Pressure half time of 82 ms. There is trivial mitral regurgitation.      Left atrial enlargement. Spontaneous echo contrast seen in the left atrium. A large stump of the left atrial appendage with no thrombus noted. Patient has history of surgical ligation of the left atrial appendage. There are spontaneous echo contrast in the atrial appendage.      The aortic valve is thickened/calcified with decreased leaflet mobility consistent with aortic stenosis.  There is trivial aortic insufficiency.      There is moderate tricuspid regurgitation.     ECHO 9/15/20  Left ventricular cavity size is dilated. There is normal wall thickness with a moderately severe reduction in   systolic function.   LV ejection fraction is visually estimated to be 25-30%.   Indeterminate diastolic function.   The right ventricle is not well visualized but appears to be normal in size with moderately reduced function.   The left atrium is moderately dilated.   A bioprosthetic mitral valve with a mean gradient of 7 mmHg. This may be a normal gradient for this valve but could suggest mild stenosis.   Trivial mitral regurgitation.   The aortic valve is thickened/calcified with a mean gradient of 16mmHg consistent with at least mild aortic stenosis. This is likely underestimated due to low cardiac output secondary to LV dysfunction.   No significant aortic valve regurgitation.   Moderate tricuspid regurgitation with RVSP of 48 mmHg.     [facility] 11/1/2019  Normal left ventricular cavity size and wall thickness. Global left ventricular function is moderate-to-severely decreased with ejection fraction estimated from 35% to 40%. Severe apical akinesis noted.      The bioprosthetic mitral valve is well seated with a mean gradient of 2mmHg and maximum pressure gradient of 5 mmHg. There is trivial mitral regurgitation.      Left atrial enlargement. Spontaneous echo contrast seen in the left atrium.   Stump of the left atrial appendage noted with no thrombus.      The aortic valve is thickened/calcified with decreased leaflet mobility consistent with aortic stenosis. There is trivial aortic insufficiency    Assessment:    1. Chronic systolic heart failure due to valvular disease ([facility]) on arb and aldosterone antagonist   2. PAF (paroxysmal atrial fibrillation) (HCC) on coumadin in nsr   3. S/P CABG x 3    4. Nonrheumatic aortic valve stenosis    5. Dehydration          Plan:   Patient Instructions   1. Hold losartan today and tomorrow  2. Drink a little more fluids today and tomorrow  3. Hold torsemide today and tomorrow  4. Resume losartan and torsemide on Saturday  5. Check blood work today  6.   RTO in 2 months      I appreciate the opportunity of cooperating in the care of this individual.    SOLEDAD Briscoe - CNS, CNS, 12/9/2021, 10:16 AM

## 2021-12-10 ENCOUNTER — TELEPHONE (OUTPATIENT)
Dept: CARDIOLOGY CLINIC | Age: 76
End: 2021-12-10

## 2021-12-10 DIAGNOSIS — I50.22 CHRONIC SYSTOLIC CHF (CONGESTIVE HEART FAILURE), NYHA CLASS 3 (HCC): Primary | ICD-10-CM

## 2021-12-10 NOTE — TELEPHONE ENCOUNTER
Tried to reach patient, [facility] for patient to call our office. Spoke to patient and her spouse she is feeling better and they will schedule the lab work in 2 weeks.

## 2021-12-10 NOTE — TELEPHONE ENCOUNTER
----- Message from SOLEDAD Huang sent at 12/10/2021  2:58 PM EST -----  See if she is feeling better   I held her water pill and BP med for a couple days  Labs are stable   Will need to check them again in 2 weeks  thanks

## 2021-12-12 NOTE — TELEPHONE ENCOUNTER
1. Rest. Drink plenty of fluids. 2. Supportive care as discussed. 3. Salt water gargles three times daily  4. Use humidifier at home when possible. 5. The rapid strep test was negative today.  We will send a throat culture to lab and call you with resul Per Dr. Pranav Mcgill- he was reviewing the medication and she needs to stop the Byetta because this can put stress on the kidneys also. is fine. Stay well hydrated by drinking 6 to 8 glasses of fluids per day (water, soft drinks, juices, tea, or soup). Extra fluids will help loosen secretions in the nose and lungs.   · Over-the-counter cold medicines will not shorten the length of time you’ day.  · Run a cool-air humidifier in your room overnight. · Stay away from cigarette smoke.   · Check the air quality index,if air pollution gives you a sore throat. On high pollution days, try to limit outdoor time.   · Suck on throat lozenges, cough drop rash  · Recent exposure to someone else with strep bacteria  · Severe hoarseness and swollen glands in the neck or jaw  Call 911  Call 911 if any of the following occur:   · Trouble breathing or catching your breath  · Drooling and problems swallowing  · W

## 2021-12-13 ENCOUNTER — TELEPHONE (OUTPATIENT)
Dept: CARDIOLOGY CLINIC | Age: 76
End: 2021-12-13

## 2021-12-13 NOTE — TELEPHONE ENCOUNTER
Make sure she is taking torsemide 40 mg bid and not lasix  She should wrap her lower legs or wear support stockings   Keep them elevated  I dont want her to take more torsemide for now just do the above

## 2021-12-13 NOTE — TELEPHONE ENCOUNTER
Patient is taking lasix 40 mg BID,  Weight is 212, coughed all night long, no increased SOB reported. Swelling is in BLE and weeping. Please advise.

## 2021-12-13 NOTE — TELEPHONE ENCOUNTER
Pt was taken off her water pill for two days, she started back on it Saturday, she is retaining fluids in her her legs, and now they are leaking.     Please advise thank you

## 2021-12-14 NOTE — TELEPHONE ENCOUNTER
Spoke to patient she has taken the metolazone and torsemide this morning. Patient stated she has been going to the bathroom a lot.

## 2021-12-16 ENCOUNTER — ANTI-COAG VISIT (OUTPATIENT)
Dept: FAMILY MEDICINE CLINIC | Age: 76
End: 2021-12-16

## 2021-12-16 ENCOUNTER — TELEPHONE (OUTPATIENT)
Dept: FAMILY MEDICINE CLINIC | Age: 76
End: 2021-12-16

## 2021-12-16 LAB — INR BLD: 6

## 2021-12-16 NOTE — TELEPHONE ENCOUNTER
Received patient's INR result from today, INR is 6.0. spoke with patient and she just finished a course of Augmentin x 2 days ago. States she isn't bleeding anywhere that she's aware of. Recommend HOLD Coumadin starting today thru Sunday when she's to recheck INR. If INR greater than 4 call office to speak with provider on call if not we will call patient Monday morning 12/20/21 with instruction. Also need to eat some greens for next couple days.

## 2021-12-20 ENCOUNTER — TELEPHONE (OUTPATIENT)
Dept: CARDIOLOGY CLINIC | Age: 76
End: 2021-12-20

## 2021-12-20 ENCOUNTER — HOSPITAL ENCOUNTER (OUTPATIENT)
Age: 76
Discharge: HOME | End: 2021-12-20
Payer: MEDICARE

## 2021-12-20 ENCOUNTER — HOSPITAL ENCOUNTER (OUTPATIENT)
Dept: GENERAL RADIOLOGY | Age: 76
Discharge: HOME | End: 2021-12-20
Payer: MEDICARE

## 2021-12-20 ENCOUNTER — ANTI-COAG VISIT (OUTPATIENT)
Dept: FAMILY MEDICINE CLINIC | Age: 76
End: 2021-12-20

## 2021-12-20 ENCOUNTER — TELEPHONE (OUTPATIENT)
Dept: FAMILY MEDICINE CLINIC | Age: 76
End: 2021-12-20

## 2021-12-20 DIAGNOSIS — R06.02 SOB (SHORTNESS OF BREATH): ICD-10-CM

## 2021-12-20 DIAGNOSIS — R06.02 SOB (SHORTNESS OF BREATH): Primary | ICD-10-CM

## 2021-12-20 LAB
ANION GAP SERPL CALCULATED.3IONS-SCNC: 9 MMOL/L (ref 3–16)
BUN BLDV-MCNC: 23 MG/DL (ref 7–20)
CALCIUM SERPL-MCNC: 8.1 MG/DL (ref 8.3–10.6)
CHLORIDE BLD-SCNC: 101 MMOL/L (ref 99–110)
CO2: 26 MMOL/L (ref 21–32)
CREAT SERPL-MCNC: 1.5 MG/DL (ref 0.6–1.2)
GFR AFRICAN AMERICAN: 41
GFR NON-AFRICAN AMERICAN: 34
GLUCOSE BLD-MCNC: 114 MG/DL (ref 70–99)
INR BLD: 2
POTASSIUM SERPL-SCNC: 3.7 MMOL/L (ref 3.5–5.1)
PRO-BNP: 2313 PG/ML (ref 0–449)
SODIUM BLD-SCNC: 136 MMOL/L (ref 136–145)

## 2021-12-20 PROCEDURE — 80048 BASIC METABOLIC PNL TOTAL CA: CPT

## 2021-12-20 PROCEDURE — 71046 X-RAY EXAM CHEST 2 VIEWS: CPT

## 2021-12-20 PROCEDURE — 36415 COLL VENOUS BLD VENIPUNCTURE: CPT

## 2021-12-20 PROCEDURE — 83880 ASSAY OF NATRIURETIC PEPTIDE: CPT

## 2021-12-20 NOTE — TELEPHONE ENCOUNTER
Spoke to patient and she has had sob and swelling for about 1 week now. She took metolazone last week Wednesday. Legs are not seeping now. She did wear the compression stockings. Last night into this morning had a 6 pd weight gain, current weight 214. Patient currently taking torsemide 20 mg tablets 40 mg bid.   Please advise

## 2021-12-20 NOTE — TELEPHONE ENCOUNTER
Pt called stated she is retaining fluids, she has gained 6 lbs over night and her left lings has a rattle and she can not cough anything up. She is SOB and can not hardly walk due to the SOB.     Pls advise thank you

## 2021-12-20 NOTE — TELEPHONE ENCOUNTER
She can take another metolazone and get labs and chest xray done  She should send a remote optival as well  I am worried that we are heading towards admission

## 2021-12-21 ENCOUNTER — TELEPHONE (OUTPATIENT)
Dept: CARDIOLOGY CLINIC | Age: 76
End: 2021-12-21

## 2021-12-21 DIAGNOSIS — I50.22 CHRONIC SYSTOLIC CHF (CONGESTIVE HEART FAILURE), NYHA CLASS 3 (HCC): Primary | ICD-10-CM

## 2021-12-21 NOTE — TELEPHONE ENCOUNTER
----- Message from SOLEDAD France CNS sent at 12/21/2021  8:26 AM EST -----  Fluid level is better and chest xray did not show any fluid  It is ok for her to take the metolazone once a week  Check labs again in 3 weeks  thanks

## 2021-12-27 ENCOUNTER — NURSE ONLY (OUTPATIENT)
Dept: CARDIOLOGY CLINIC | Age: 76
End: 2021-12-27
Payer: MEDICARE

## 2021-12-27 DIAGNOSIS — Z95.810 ICD (IMPLANTABLE CARDIOVERTER-DEFIBRILLATOR), DUAL, IN SITU: ICD-10-CM

## 2021-12-27 DIAGNOSIS — I25.5 ISCHEMIC CARDIOMYOPATHY: ICD-10-CM

## 2021-12-27 DIAGNOSIS — I50.22 CHRONIC SYSTOLIC CHF (CONGESTIVE HEART FAILURE), NYHA CLASS 3 (HCC): ICD-10-CM

## 2021-12-27 PROCEDURE — 93297 REM INTERROG DEV EVAL ICPMS: CPT | Performed by: NURSE PRACTITIONER

## 2021-12-27 PROCEDURE — G2066 INTER DEVC REMOTE 30D: HCPCS | Performed by: NURSE PRACTITIONER

## 2021-12-27 NOTE — PROGRESS NOTES
We received remote transmission from patient's dual chamber ICD monitor at home. Transmission shows normal sensing and pacing function. No new arrhythmias/events recorded. Optivol is within normal range. NP will review. See interrogation under cardiology tab in the [address] [place] [address] [address] field for more details. Will continue to monitor remotely.

## 2021-12-30 ENCOUNTER — ANTI-COAG VISIT (OUTPATIENT)
Dept: FAMILY MEDICINE CLINIC | Age: 76
End: 2021-12-30

## 2021-12-30 ENCOUNTER — TELEPHONE (OUTPATIENT)
Dept: FAMILY MEDICINE CLINIC | Age: 76
End: 2021-12-30

## 2021-12-30 LAB — INR BLD: 1.6

## 2021-12-30 NOTE — TELEPHONE ENCOUNTER
Patient advised INR is 1.6, recommend to continue same dose and repeat in 1 week per Dr Salcido Area.

## 2022-01-06 ENCOUNTER — ANTI-COAG VISIT (OUTPATIENT)
Dept: FAMILY MEDICINE CLINIC | Age: 77
End: 2022-01-06

## 2022-01-06 LAB — INR BLD: 1.4

## 2022-01-10 ENCOUNTER — HOSPITAL ENCOUNTER (OUTPATIENT)
Age: 77
Discharge: HOME | End: 2022-01-10
Payer: MEDICARE

## 2022-01-10 DIAGNOSIS — I50.22 CHRONIC SYSTOLIC CHF (CONGESTIVE HEART FAILURE), NYHA CLASS 3 (HCC): ICD-10-CM

## 2022-01-10 LAB
ANION GAP SERPL CALCULATED.3IONS-SCNC: 12 MMOL/L (ref 3–16)
BUN BLDV-MCNC: 41 MG/DL (ref 7–20)
CALCIUM SERPL-MCNC: 8.7 MG/DL (ref 8.3–10.6)
CHLORIDE BLD-SCNC: 97 MMOL/L (ref 99–110)
CO2: 30 MMOL/L (ref 21–32)
CREAT SERPL-MCNC: 1.8 MG/DL (ref 0.6–1.2)
GFR AFRICAN AMERICAN: 33
GFR NON-AFRICAN AMERICAN: 27
GLUCOSE BLD-MCNC: 146 MG/DL (ref 70–99)
POTASSIUM SERPL-SCNC: 4 MMOL/L (ref 3.5–5.1)
PRO-BNP: 2336 PG/ML (ref 0–449)
SODIUM BLD-SCNC: 139 MMOL/L (ref 136–145)

## 2022-01-10 PROCEDURE — 80048 BASIC METABOLIC PNL TOTAL CA: CPT

## 2022-01-10 PROCEDURE — 36415 COLL VENOUS BLD VENIPUNCTURE: CPT

## 2022-01-10 PROCEDURE — 83880 ASSAY OF NATRIURETIC PEPTIDE: CPT

## 2022-01-11 ENCOUNTER — TELEPHONE (OUTPATIENT)
Dept: CARDIOLOGY CLINIC | Age: 77
End: 2022-01-11

## 2022-01-11 NOTE — TELEPHONE ENCOUNTER
----- Message from SOLEDAD Espinal - CNS sent at 1/11/2022  8:26 AM EST -----  Labs are stable   Continue current medications  thanks

## 2022-01-11 NOTE — TELEPHONE ENCOUNTER
I spoke to patient with lab results per [id number] [facility] . Patient verbalized understanding. Advised patient to call back with any questions.

## 2022-01-12 RX ORDER — METOPROLOL SUCCINATE 50 MG/1
TABLET, EXTENDED RELEASE ORAL
Qty: 90 TABLET | Refills: 0 | Status: SHIPPED | OUTPATIENT
Start: 2022-01-12 | End: 2022-04-20

## 2022-01-12 RX ORDER — LOSARTAN POTASSIUM 25 MG/1
TABLET ORAL
Qty: 90 TABLET | Refills: 0 | Status: SHIPPED | OUTPATIENT
Start: 2022-01-12 | End: 2022-02-14

## 2022-01-13 ENCOUNTER — ANTI-COAG VISIT (OUTPATIENT)
Dept: FAMILY MEDICINE CLINIC | Age: 77
End: 2022-01-13

## 2022-01-13 ENCOUNTER — TELEPHONE (OUTPATIENT)
Dept: FAMILY MEDICINE CLINIC | Age: 77
End: 2022-01-13

## 2022-01-13 LAB — INR BLD: 1.5

## 2022-01-13 NOTE — TELEPHONE ENCOUNTER
Radha Arthur is calling from [facility] to give a INR    INR   1.5   1/13/22    Please advise      Provider out of the office

## 2022-01-20 ENCOUNTER — TELEPHONE (OUTPATIENT)
Dept: FAMILY MEDICINE CLINIC | Age: 77
End: 2022-01-20

## 2022-01-20 LAB — INR BLD: 1.7

## 2022-01-20 NOTE — TELEPHONE ENCOUNTER
Joselin from [address] [place] [address] INR [phone] is calling with out of range INR      inr 1.7        Provider out of the office

## 2022-01-20 NOTE — TELEPHONE ENCOUNTER
----- Message from Geri Constantino sent at 1/20/2022  8:18 AM EST -----  Subject: Referral Request    QUESTIONS   Reason for referral request? routine mammo   Has the physician seen you for this condition before? No   Preferred Specialist (if applicable)? Do you already have an appointment scheduled? No  Additional Information for Provider? Pt would like to be scheduled for a   mammo at [facility] for [facility]  ---------------------------------------------------------------------------  --------------  CALL BACK INFO  What is the best way for the office to contact you? OK to leave message on   voicemail  Preferred Call Back Phone Number?  [phone]

## 2022-01-25 ENCOUNTER — ANTI-COAG VISIT (OUTPATIENT)
Dept: FAMILY MEDICINE CLINIC | Age: 77
End: 2022-01-25

## 2022-01-28 ENCOUNTER — ANTI-COAG VISIT (OUTPATIENT)
Dept: FAMILY MEDICINE CLINIC | Age: 77
End: 2022-01-28

## 2022-01-28 ENCOUNTER — TELEPHONE (OUTPATIENT)
Dept: FAMILY MEDICINE CLINIC | Age: 77
End: 2022-01-28

## 2022-01-28 LAB — INR BLD: 1.7

## 2022-01-28 NOTE — TELEPHONE ENCOUNTER
bJ Galdamez with [facility] Remote INR [phone] calling with a out of range INR taking at 11:58am 1/28/22      INR 1.7

## 2022-02-01 ENCOUNTER — TELEPHONE (OUTPATIENT)
Dept: FAMILY MEDICINE CLINIC | Age: 77
End: 2022-02-01

## 2022-02-01 RX ORDER — MUPIROCIN CALCIUM 20 MG/G
CREAM TOPICAL
Qty: 30 G | Refills: 1 | Status: SHIPPED | OUTPATIENT
Start: 2022-02-01 | End: 2022-03-03

## 2022-02-01 NOTE — TELEPHONE ENCOUNTER
Pt calling in to request a refill of her mupirocin cream.  The patient stated that she was prescribed this in the hospital when she has edema and her skin starts to crack. Pt hoping to have this sent to her [address] please.     Pt phone [phone]

## 2022-02-07 ENCOUNTER — TELEPHONE (OUTPATIENT)
Dept: FAMILY MEDICINE CLINIC | Age: 77
End: 2022-02-07

## 2022-02-07 ENCOUNTER — HOSPITAL ENCOUNTER (OUTPATIENT)
Age: 77
Discharge: HOME | End: 2022-02-07
Payer: MEDICARE

## 2022-02-07 ENCOUNTER — NURSE ONLY (OUTPATIENT)
Dept: CARDIOLOGY CLINIC | Age: 77
End: 2022-02-07
Payer: MEDICARE

## 2022-02-07 DIAGNOSIS — I25.5 ISCHEMIC CARDIOMYOPATHY: ICD-10-CM

## 2022-02-07 DIAGNOSIS — E03.9 ACQUIRED HYPOTHYROIDISM: ICD-10-CM

## 2022-02-07 DIAGNOSIS — E03.9 ACQUIRED HYPOTHYROIDISM: Primary | ICD-10-CM

## 2022-02-07 DIAGNOSIS — I47.1 PAROXYSMAL SVT (SUPRAVENTRICULAR TACHYCARDIA) (HCC): ICD-10-CM

## 2022-02-07 DIAGNOSIS — I50.22 CHRONIC SYSTOLIC CHF (CONGESTIVE HEART FAILURE), NYHA CLASS 3 (HCC): ICD-10-CM

## 2022-02-07 DIAGNOSIS — I48.0 PAF (PAROXYSMAL ATRIAL FIBRILLATION) (HCC): ICD-10-CM

## 2022-02-07 DIAGNOSIS — Z95.810 ICD (IMPLANTABLE CARDIOVERTER-DEFIBRILLATOR), DUAL, IN SITU: ICD-10-CM

## 2022-02-07 LAB — TSH SERPL DL<=0.05 MIU/L-ACNC: 4.23 UIU/ML (ref 0.27–4.2)

## 2022-02-07 PROCEDURE — 84443 ASSAY THYROID STIM HORMONE: CPT

## 2022-02-07 PROCEDURE — 36415 COLL VENOUS BLD VENIPUNCTURE: CPT

## 2022-02-07 NOTE — TELEPHONE ENCOUNTER
----- Message from Kwasi Funes sent at 2/7/2022  8:44 AM EST -----  Subject: Referral Request    QUESTIONS   Reason for referral request? Patient husbands Kallie Stewart is requesting   blood work for patient . Due to her stated new thyroid medication   Has the physician seen you for this condition before? Yes  Select a date? 2021-11-22  Select the Provider the patient wants to be referred to, if known (PCP or   Specialist)? Kimi Pineda   Preferred Specialist (if applicable)? Do you already have an appointment scheduled? Yes  Select Scheduled Date? 2022-02-22  Select Scheduled Physician? Zee Vásquez   Additional Information for Provider? Patient is requesting call back when   order have been add to system. ---------------------------------------------------------------------------  --------------  Tyler PAN  What is the best way for the office to contact you? OK to leave message on   voicemail  Preferred Call Back Phone Number?  [phone]

## 2022-02-08 PROCEDURE — 93297 REM INTERROG DEV EVAL ICPMS: CPT | Performed by: INTERNAL MEDICINE

## 2022-02-08 PROCEDURE — 93296 REM INTERROG EVL PM/IDS: CPT | Performed by: INTERNAL MEDICINE

## 2022-02-08 PROCEDURE — 93295 DEV INTERROG REMOTE 1/2/MLT: CPT | Performed by: INTERNAL MEDICINE

## 2022-02-09 ENCOUNTER — ANTI-COAG VISIT (OUTPATIENT)
Dept: FAMILY MEDICINE CLINIC | Age: 77
End: 2022-02-09

## 2022-02-09 LAB — INR BLD: 3.6

## 2022-02-14 ENCOUNTER — OFFICE VISIT (OUTPATIENT)
Dept: CARDIOLOGY CLINIC | Age: 77
End: 2022-02-14
Payer: MEDICARE

## 2022-02-14 VITALS
WEIGHT: 218 LBS | HEART RATE: 74 BPM | HEIGHT: 66 IN | SYSTOLIC BLOOD PRESSURE: 106 MMHG | BODY MASS INDEX: 35.03 KG/M2 | OXYGEN SATURATION: 94 % | DIASTOLIC BLOOD PRESSURE: 64 MMHG

## 2022-02-14 DIAGNOSIS — I35.0 NONRHEUMATIC AORTIC VALVE STENOSIS: ICD-10-CM

## 2022-02-14 DIAGNOSIS — I95.9 HYPOTENSION, UNSPECIFIED HYPOTENSION TYPE: ICD-10-CM

## 2022-02-14 DIAGNOSIS — I48.0 PAF (PAROXYSMAL ATRIAL FIBRILLATION) (HCC): ICD-10-CM

## 2022-02-14 DIAGNOSIS — E66.01 SEVERE OBESITY (BMI 35.0-39.9) WITH COMORBIDITY (HCC): ICD-10-CM

## 2022-02-14 DIAGNOSIS — I50.22 CHRONIC SYSTOLIC CHF (CONGESTIVE HEART FAILURE), NYHA CLASS 3 (HCC): Primary | ICD-10-CM

## 2022-02-14 DIAGNOSIS — Z95.1 S/P CABG X 3: ICD-10-CM

## 2022-02-14 PROCEDURE — 99214 OFFICE O/P EST MOD 30 MIN: CPT | Performed by: CLINICAL NURSE SPECIALIST

## 2022-02-14 RX ORDER — LOSARTAN POTASSIUM 25 MG/1
12.5 TABLET ORAL DAILY
Qty: 45 TABLET | Refills: 1 | Status: SHIPPED | OUTPATIENT
Start: 2022-02-14 | End: 2022-03-02 | Stop reason: SINTOL

## 2022-02-14 RX ORDER — METOLAZONE 2.5 MG/1
TABLET ORAL
Qty: 30 TABLET | Refills: 0 | Status: SHIPPED | OUTPATIENT
Start: 2022-02-14 | End: 2022-02-22 | Stop reason: SDUPTHER

## 2022-02-14 NOTE — PROGRESS NOTES
[address]  Progress Note    Primary Care Doctor:  Jd Ryan MD    Chief Complaint   Patient presents with    Follow-up    Congestive Heart Failure        History of Present Illness:  77 y.o. female with history of CAD/CABG in , PAF with maze , HTN, HLD, DVT/PE on coumadin, 2 CVs unsuccessful  -3/10/21 for small bowel pneumatosis with loculated pneumoperitoneum (IV zoysn and TPN), acute on chronic sHF (torsemide decreased at discharge, aldactone was held and coreg dose decreased due to hypotension), TORIBIO on CKD, PAF  ICD placed 2021  covid (antibodies, steroids and antibiotics). She was on prednisone  to  (off for 2 days). I had the pleasure of seeing Katia Stanley in follow up for sHF. She is in a wheel chair with Janie Swartz (). Her optival was elevated (close to threshold) end of December to end of  and since normal impedence. She is taking metolazone once a week (not 30 mins prior to torsemide but reverse). She feels that her abdomen is distended and weight is up 18 pounds from her baseline of 199. Her back pain is better. She denies any chest pain, palpitations, edema or increased shortness of breath. Past Medical History:   has a past medical history of Asthma, Atrial fibrillation ([address] Eosinophilia, Hemoptysis, HIGH CHOLESTEROL, Hx of blood clots, Hypertension, Irregular heart beat, Other specified gastritis without mention of hemorrhage, Palpitations, Skin cancer, and Type II or unspecified type diabetes mellitus without mention of complication, not stated as uncontrolled. Surgical History:   has a past surgical history that includes Cholecystectomy;  section; Colonoscopy (2017); skin biopsy; bronchoscopy (2016); Coronary artery bypass graft (2018); Mitral valve replacement (2018); transesophageal echocardiogram (2018); Tunneled venous catheter placement (Left, 2018);  Cardiac catheterization (08/16/2018); Insertable Cardiac Monitor (Left, 08/16/2018); Colonoscopy (01/17/2014); Hysterectomy; Upper gastrointestinal endoscopy (N/A, 10/10/2018); Colonoscopy (10/10/2018); Colonoscopy (N/A, 8/7/2020); Pain management procedure (N/A, 12/18/2020); Pain management procedure (Bilateral, 1/18/2021); and Cardiac catheterization (5/2 and 5/3 2021). Social History:   reports that she has never smoked. She has never used smokeless tobacco. She reports that she does not drink alcohol and does not use drugs. Family History:   Family History   Problem Relation Age of Onset    Cancer Father     Asthma Mother     Hypertension Mother     Heart Disease Mother     High Blood Pressure Mother        Home Medications:  Prior to Admission medications    Medication Sig Start Date End Date Taking? Authorizing Provider   metOLazone (ZAROXOLYN) 2.5 MG tablet 2.5 mg on tues and Friday and as directed 2/14/22  Yes SOLEDAD Vines   losartan (COZAAR) 25 MG tablet Take 0.5 tablets by mouth daily 2/14/22  Yes SOLEDAD Vines   mupirocin (BACTROBAN) 2 % cream Apply topically 3 times daily.  2/1/22 3/3/22 Yes Wes Wilkinson MD   metoprolol succinate (TOPROL XL) 50 MG extended release tablet TAKE 1 TABLET DAILY 1/12/22  Yes Wes Wilkinson MD   levothyroxine (SYNTHROID) 75 MCG tablet Take 1 tablet by mouth daily 12/6/21  Yes Wes Wilkinson MD   insulin glargine (LANTUS SOLOSTAR) 100 UNIT/ML injection pen INJECT 20 UNITS IN THE MORNING 11/22/21  Yes Wes Wilkinson MD   insulin lispro, 1 Unit Dial, (HUMALOG KWIKPEN) 100 UNIT/ML SOPN Sliding scale-140-199-2 units, 200-249- 3 units, 250-300-4 units, >300 take 5 units 11/22/21  Yes Wes Wilkinson MD   albuterol sulfate  (90 Base) MCG/ACT inhaler USE 2 INHALATIONS EVERY 6 HOURS AS NEEDED FOR WHEEZING 11/19/21  Yes Wes Wilkinson MD   nystatin (MYCOSTATIN) 554467 UNIT/ML suspension TAKE ONE TEASPOONFUL BY MOUTH FOUR TIMES A DAY FOR 5 DAYS 11/17/21  Yes Russ Rodríguez MD   ipratropium-albuterol (DUONEB) 0.5-2.5 (3) MG/3ML SOLN nebulizer solution Inhale 3 mLs into the lungs every 4 hours as needed for Shortness of Breath 11/15/21  Yes Russ Rodríguez MD   amiodarone (CORDARONE) 200 MG tablet Take 0.5 tablets by mouth daily 11/2/21  Yes SOLEDAD Barger - CNP   spironolactone (ALDACTONE) 25 MG tablet Take 1 tablet by mouth 2 times daily 10/6/21  Yes SOLEDAD Conway - CNS   vitamin D (ERGOCALCIFEROL) 1.25 MG (94842 UT) CAPS capsule Take 1 capsule by mouth once a week 10/5/21  Yes SOLEDAD Conway - CNS   torsemide (DEMADEX) 20 MG tablet Take 2 tablets by mouth 2 times daily 9/20/21  Yes Russ Rodríguez MD   midodrine (PROAMATINE) 2.5 MG tablet TAKE ONE TABLET BY MOUTH TWICE A DAY 9/16/21  Yes SOLEDAD Ott - CNS   FreeStyle Lancets MISC 1 each by Does not apply route 4 times daily 9/7/21  Yes Russ Rodríguez MD   Blood Glucose Monitoring Suppl (FREESTYLE LITE) GAETANO 1 Device by Does not apply route 4 times daily   Yes Historical Provider, MD   oxyCODONE (ROXICODONE) 5 MG immediate release tablet Take 0.5 mg by mouth daily.    Yes Historical Provider, MD   montelukast (SINGULAIR) 10 MG tablet TAKE 1 TABLET NIGHTLY 8/3/21  Yes Russ Rodríguez MD   potassium chloride (KLOR-CON M) 10 MEQ extended release tablet TAKE 1 TABLET DAILY 8/3/21  Yes Russ Rodríguez MD   warfarin (COUMADIN) 5 MG tablet TAKE 1 TABLET DAILY 8/3/21  Yes Russ Rodríguez MD   FREESTYLE LITE strip USE TO TEST FOUR TIMES A DAY 8/2/21  Yes Russ Rodríguez MD   Insulin Pen Needle (BD PEN NEEDLE JANNET U/F) 32G X 4 MM MISC USE 1 PEN NEEDLE FOUR TIMES A DAY 6/14/21  Yes Russ Rodríguez MD   magnesium oxide (MAG-OX) 400 MG tablet Take 1 tablet by mouth daily 5/10/21  Yes Pablo Gaviria MD   ACETAMINOPHEN PO Take 500 mg by mouth every 6 hours as needed    Yes Historical Provider, MD   albuterol (PROVENTIL) (2.5 MG/3ML) 0.083% nebulizer solution Take 3 mLs by nebulization every 6 hours as needed for Wheezing 6/2/20  Yes Ranjeet Momin MD   polyethylene glycol [facility]) 17 GM/SCOOP powder Take 17 g by mouth daily    Yes Historical Provider, MD   omeprazole (PRILOSEC) 20 MG delayed release capsule Take 20 mg by mouth daily   Yes Historical Provider, MD   ondansetron (ZOFRAN) 4 MG tablet Take 1 tablet by mouth 3 times daily as needed for Nausea or Vomiting 3/26/20  Yes Ranjeet Momin MD   aspirin 81 MG chewable tablet Take 1 tablet by mouth daily 6/7/19  Yes Ranjeet Momin MD   vitamin B-12 500 MCG tablet Take 1 tablet by mouth daily 10/12/18  Yes Ovidio William MD        Allergies:  Oliafxiw-huymcdm-snguaj [fluocinolone], Ciprofloxacin, Diovan [valsartan], Flagyl [metronidazole], Metformin and related [metformin and related], Benazepril, Morphine, Saxagliptin, and Levaquin [levofloxacin]     Review of Systems:   · Constitutional: there has been no unanticipated weight loss. There's been no change in energy level, sleep pattern, or activity level. · Eyes: No visual changes or diplopia. No scleral icterus. · ENT: No Headaches, hearing loss or vertigo. No mouth sores or sore throat. · Cardiovascular: Reviewed in HPI  · Respiratory: No cough or wheezing, no sputum production. No hematemesis. · Gastrointestinal: No abdominal pain, appetite loss, blood in stools. No change in bowel or bladder habits. · Genitourinary: No dysuria, trouble voiding, or hematuria. · Musculoskeletal:  No gait disturbance, weakness or joint complaints. · Integumentary: No rash or pruritis. · Neurological: No headache, diplopia, change in muscle strength, numbness or tingling. No change in gait, balance, coordination, mood, affect, memory, mentation, behavior. · Psychiatric: No anxiety, no depression. · Endocrine: No malaise, fatigue or temperature intolerance. No excessive thirst, fluid intake, or urination. No tremor.   · Hematologic/Lymphatic: No abnormal bruising or bleeding, blood clots or swollen lymph nodes. · Allergic/Immunologic: No nasal congestion or hives. Physical Examination:    Vitals:    02/14/22 0827   BP: 106/64   Site: Left Upper Arm   Position: Sitting   Cuff Size: Large Adult   Pulse: 74   SpO2: 94%   Weight: 218 lb (98.9 kg)   Height: 5' 6\" (1.676 m)        Constitutional and General Appearance: Warm and dry, no apparent distress, normal coloration  HEENT:  Normocephalic, atraumatic  Respiratory:  · Normal excursion and expansion without use of accessory muscles  · Resp Auscultation: Normal breath sounds without dullness  Cardiovascular:  · The apical impulses not displaced  · Heart tones are crisp and normal  · JVP normal cm H2O  · regular rate and rhythm  · Peripheral pulses are symmetrical and full  · There is no clubbing, cyanosis of the extremities.   · No ankle edema  · Pedal Pulses: 2+ and equal   Abdomen: distended + bowel sounds  · No masses or tenderness  · Liver/Spleen: No Abnormalities Noted  Neurological/Psychiatric:  · Alert and oriented in all spheres  · Moves all extremities well  · Exhibits normal gait balance and coordination  · No abnormalities of mood, affect, memory, mentation, or behavior are noted    Lab Data:    CBC:   Lab Results   Component Value Date    WBC 11.2 12/09/2021    WBC 4.8 10/05/2021    WBC 8.2 08/13/2021    RBC 4.20 12/09/2021    RBC 3.95 10/05/2021    RBC 4.23 08/13/2021    HGB 12.5 12/09/2021    HGB 11.8 10/05/2021    HGB 12.5 08/13/2021    HCT 37.9 12/09/2021    HCT 36.2 10/05/2021    HCT 37.5 08/13/2021    MCV 90.3 12/09/2021    MCV 91.5 10/05/2021    MCV 88.5 08/13/2021    RDW 15.0 12/09/2021    RDW 15.6 10/05/2021    RDW 17.8 08/13/2021     12/09/2021     10/05/2021     08/13/2021     BMP:  Lab Results   Component Value Date     01/10/2022     12/20/2021     12/09/2021    K 4.0 01/10/2022    K 3.7 12/20/2021    K 4.1 12/09/2021    K 3.5 04/21/2021    K 3.7 04/20/2021    K 4.0 04/19/2021    CL 97 01/10/2022     12/20/2021    CL 93 12/09/2021    CO2 30 01/10/2022    CO2 26 12/20/2021    CO2 29 12/09/2021    PHOS 4.0 05/05/2021    PHOS 3.7 05/04/2021    PHOS 3.6 05/03/2021    BUN 41 01/10/2022    BUN 23 12/20/2021    BUN 58 12/09/2021    CREATININE 1.8 01/10/2022    CREATININE 1.5 12/20/2021    CREATININE 1.8 12/09/2021     BNP:   Lab Results   Component Value Date    PROBNP 2,336 01/10/2022    PROBNP 2,313 12/20/2021    PROBNP 2,953 12/09/2021     Cardiac Imaging:  [facility] Preliminary 5/4/2021 Dr Sofia Triana  AV - area ~ 1.5, opens adequately, not severe aortic stenosis     [facility] 5/3/2021 Dr Sofia Triana  Artery Findings/Result   LM Normal   LAD 50% mid, 70% mid, competitive flow distal from LIMA   Cx OM1 20% ostial to prox, superior branch mid OM1 50% ostial   RI N/A   S-D-RPL patent   L-LAD patent   RCA 50-60% distal, very heavily calcified   LVEDP 15   AV Peak to peak gradient 36mmHg, valve crossed easily with pigtail. LVG NA      Intervention:         None     Post Cath Dx:       Patent grafts     Echo 4/20/21  Summary   Overall left ventricular systolic function is severely depressed .   Ejection fraction is visually estimated to be 10-15 %.  E/e'= 23.2 GLS=-3.4   Moderately dilated left ventricle.   There is severe diffuse hypokinesis.   Indeterminate diastolic function.   A bioprosthetic mitral valve is well seated with peak velocity of 1.59m/s   and a mean gradient of 3mmHg.   The left atrium is moderately dilated.   Mild aortic stenosis with a peak velocity of 2.5m/s and a mean pressure   gradient of 14mmHg.   The aortic valve is thickened/calcified with decreased leaflet mobility   consistent with aortic stenosis.   Mild to moderate tricuspid regurgitation.   Estimated pulmonary artery systolic pressure is mildly to moderately   elevated at 46 mmHg assuming a right atrial pressure of 8 mmHg    11/30/2020 Dobutamine Echo   Dobutamine echocardiogram for aortic stenosis.   Very technically limited exam due to atrial fibrillation.   Baseline echocardiogram shows severe left ventricular dysfunction with   anterior, lateral and apical hypokinesis with an ejection fraction of 20-25%.   There is no improvement in wall motion with low or high dose dobutamine   suggestive of nonviable myocardium.      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 After dobutamine stress the mean AV   gradient rises to 20mmHg with 20mcg of dobutamine consistent with mild to   moderate aortic stenosis. Prosthetic mitral valve with a mean gradient of 7mmHg        [facility] 10/21/2020  Mildly dilated left ventricular size and normal wall thickness. Global left ventricular function is severely decreased with ejection fraction estimated at 25%. Patient is in atrial fibrillation during procedure.      The bioprosthetic mitral valve is well seated with a mean gradient of 5mmHg and maximum pressure gradient of 15 mmHg with heart rate of 89 bpm. Pressure half time of 82 ms. There is trivial mitral regurgitation.      Left atrial enlargement. Spontaneous echo contrast seen in the left atrium. A large stump of the left atrial appendage with no thrombus noted. Patient has history of surgical ligation of the left atrial appendage. There are spontaneous echo contrast in the atrial appendage.      The aortic valve is thickened/calcified with decreased leaflet mobility consistent with aortic stenosis.  There is trivial aortic insufficiency.      There is moderate tricuspid regurgitation.     ECHO 9/15/20  Left ventricular cavity size is dilated. There is normal wall thickness with a moderately severe reduction in   systolic function.   LV ejection fraction is visually estimated to be 25-30%.   Indeterminate diastolic function.   The right ventricle is not well visualized but appears to be normal in size with moderately reduced function.   The left atrium is moderately dilated.   A bioprosthetic mitral valve with a mean gradient of 7 mmHg. This may be a normal gradient for this valve but could suggest mild stenosis.   Trivial mitral regurgitation.   The aortic valve is thickened/calcified with a mean gradient of 16mmHg consistent with at least mild aortic stenosis. This is likely underestimated due to low cardiac output secondary to LV dysfunction.   No significant aortic valve regurgitation.   Moderate tricuspid regurgitation with RVSP of 48 mmHg.     [facility] 11/1/2019  Normal left ventricular cavity size and wall thickness. Global left ventricular function is moderate-to-severely decreased with ejection fraction estimated from 35% to 40%. Severe apical akinesis noted.      The bioprosthetic mitral valve is well seated with a mean gradient of 2mmHg and maximum pressure gradient of 5 mmHg. There is trivial mitral regurgitation.      Left atrial enlargement. Spontaneous echo contrast seen in the left atrium.   Stump of the left atrial appendage noted with no thrombus.      The aortic valve is thickened/calcified with decreased leaflet mobility consistent with aortic stenosis. There is trivial aortic insufficiency    Assessment:    1. Chronic systolic heart failure due to valvular disease (HCC) on arb, BB and aldosterone antagonist   2. PAF (paroxysmal atrial fibrillation) (HCC) on coumadin in nsr   3. S/P CABG x 3    4. Nonrheumatic aortic valve stenosis    5. Hypotension on midodrine   6. Obesity BMI 35-39.9      Plan:   Patient Instructions   1. Cut losartan to 12.5 mg daily  2. Increase metolazone to 2.5 mg on tues and Friday  3. Continue all current medications  4. Check blood work   5.   RTO 3 months      I appreciate the opportunity of cooperating in the care of this individual.    SOLEDAD Hawkins - CNS, CNS, 2/14/2022, 3:36 PM

## 2022-02-14 NOTE — PATIENT INSTRUCTIONS
1.  Cut losartan to 12.5 mg daily  2. Increase metolazone to 2.5 mg on tues and Friday  3. Continue all current medications  4. Check blood work   5.   RTO 3 months

## 2022-02-15 DIAGNOSIS — E11.69 DIABETES MELLITUS TYPE 2 IN OBESE (HCC): ICD-10-CM

## 2022-02-15 DIAGNOSIS — E66.9 DIABETES MELLITUS TYPE 2 IN OBESE (HCC): ICD-10-CM

## 2022-02-15 RX ORDER — BLOOD-GLUCOSE METER
KIT MISCELLANEOUS
Qty: 200 STRIP | Refills: 3 | Status: SHIPPED | OUTPATIENT
Start: 2022-02-15 | End: 2022-09-15

## 2022-02-18 ENCOUNTER — TELEPHONE (OUTPATIENT)
Dept: ADMINISTRATIVE | Age: 77
End: 2022-02-18

## 2022-02-21 ENCOUNTER — HOSPITAL ENCOUNTER (OUTPATIENT)
Age: 77
Discharge: HOME | End: 2022-02-21
Payer: MEDICARE

## 2022-02-21 DIAGNOSIS — I50.22 CHRONIC SYSTOLIC CHF (CONGESTIVE HEART FAILURE), NYHA CLASS 3 (HCC): ICD-10-CM

## 2022-02-21 LAB
ANION GAP SERPL CALCULATED.3IONS-SCNC: 19 MMOL/L (ref 3–16)
BUN BLDV-MCNC: 42 MG/DL (ref 7–20)
CALCIUM SERPL-MCNC: 8.9 MG/DL (ref 8.3–10.6)
CHLORIDE BLD-SCNC: 98 MMOL/L (ref 99–110)
CO2: 26 MMOL/L (ref 21–32)
CREAT SERPL-MCNC: 2.3 MG/DL (ref 0.6–1.2)
GFR AFRICAN AMERICAN: 25
GFR NON-AFRICAN AMERICAN: 21
GLUCOSE BLD-MCNC: 87 MG/DL (ref 70–99)
POTASSIUM SERPL-SCNC: 4.1 MMOL/L (ref 3.5–5.1)
PRO-BNP: 1307 PG/ML (ref 0–449)
SODIUM BLD-SCNC: 143 MMOL/L (ref 136–145)
TSH SERPL DL<=0.05 MIU/L-ACNC: 4.23 UIU/ML (ref 0.27–4.2)

## 2022-02-21 PROCEDURE — 36415 COLL VENOUS BLD VENIPUNCTURE: CPT

## 2022-02-21 PROCEDURE — 83036 HEMOGLOBIN GLYCOSYLATED A1C: CPT

## 2022-02-21 PROCEDURE — 80048 BASIC METABOLIC PNL TOTAL CA: CPT

## 2022-02-21 PROCEDURE — 84443 ASSAY THYROID STIM HORMONE: CPT

## 2022-02-21 PROCEDURE — 83880 ASSAY OF NATRIURETIC PEPTIDE: CPT

## 2022-02-22 ENCOUNTER — OFFICE VISIT (OUTPATIENT)
Dept: FAMILY MEDICINE CLINIC | Age: 77
End: 2022-02-22
Payer: MEDICARE

## 2022-02-22 ENCOUNTER — TELEPHONE (OUTPATIENT)
Dept: CARDIOLOGY CLINIC | Age: 77
End: 2022-02-22

## 2022-02-22 VITALS
OXYGEN SATURATION: 96 % | TEMPERATURE: 97.2 F | HEART RATE: 76 BPM | BODY MASS INDEX: 34.94 KG/M2 | SYSTOLIC BLOOD PRESSURE: 110 MMHG | DIASTOLIC BLOOD PRESSURE: 68 MMHG | WEIGHT: 216.5 LBS | RESPIRATION RATE: 16 BRPM

## 2022-02-22 DIAGNOSIS — I48.19 PERSISTENT ATRIAL FIBRILLATION (HCC): ICD-10-CM

## 2022-02-22 DIAGNOSIS — I34.0 NONRHEUMATIC MITRAL VALVE REGURGITATION: ICD-10-CM

## 2022-02-22 DIAGNOSIS — N18.32 TYPE 2 DIABETES MELLITUS WITH STAGE 3B CHRONIC KIDNEY DISEASE, WITH LONG-TERM CURRENT USE OF INSULIN (HCC): Primary | ICD-10-CM

## 2022-02-22 DIAGNOSIS — E03.9 ACQUIRED HYPOTHYROIDISM: ICD-10-CM

## 2022-02-22 DIAGNOSIS — E11.22 TYPE 2 DIABETES MELLITUS WITH STAGE 3B CHRONIC KIDNEY DISEASE, WITH LONG-TERM CURRENT USE OF INSULIN (HCC): Primary | ICD-10-CM

## 2022-02-22 DIAGNOSIS — Z79.4 TYPE 2 DIABETES MELLITUS WITH STAGE 3B CHRONIC KIDNEY DISEASE, WITH LONG-TERM CURRENT USE OF INSULIN (HCC): Primary | ICD-10-CM

## 2022-02-22 DIAGNOSIS — I50.22 CHRONIC SYSTOLIC CHF (CONGESTIVE HEART FAILURE), NYHA CLASS 3 (HCC): ICD-10-CM

## 2022-02-22 PROBLEM — U07.1 COVID: Status: RESOLVED | Noted: 2021-11-11 | Resolved: 2022-02-22

## 2022-02-22 PROBLEM — E11.65 TYPE 2 DIABETES MELLITUS WITH HYPERGLYCEMIA, WITH LONG-TERM CURRENT USE OF INSULIN (HCC): Status: RESOLVED | Noted: 2017-04-27 | Resolved: 2022-02-22

## 2022-02-22 PROBLEM — Z95.1 S/P CABG X 3: Status: RESOLVED | Noted: 2018-08-22 | Resolved: 2022-02-22

## 2022-02-22 LAB
ESTIMATED AVERAGE GLUCOSE: 134.1 MG/DL
HBA1C MFR BLD: 6.3 %

## 2022-02-22 PROCEDURE — 99214 OFFICE O/P EST MOD 30 MIN: CPT | Performed by: FAMILY MEDICINE

## 2022-02-22 RX ORDER — METOLAZONE 2.5 MG/1
TABLET ORAL
Qty: 30 TABLET | Refills: 0
Start: 2022-02-22 | End: 2022-05-03

## 2022-02-22 RX ORDER — METOLAZONE 2.5 MG/1
TABLET ORAL
Qty: 30 TABLET | Refills: 0 | Status: SHIPPED | OUTPATIENT
Start: 2022-02-22 | End: 2022-02-22

## 2022-02-22 RX ORDER — LEVOTHYROXINE SODIUM 0.1 MG/1
100 TABLET ORAL DAILY
Qty: 90 TABLET | Refills: 1 | Status: SHIPPED | OUTPATIENT
Start: 2022-02-22 | End: 2022-08-12

## 2022-02-22 NOTE — TELEPHONE ENCOUNTER
----- Message from SOLEDAD Mehta - CNS sent at 2/22/2022  1:51 PM EST -----  What is her weight as fluid level is much improved and creat up to 2.3  We need to cut the metolazone back to once a week or maybe 1 x a week and then the next week twice  Let me know weighs  thanks

## 2022-02-22 NOTE — TELEPHONE ENCOUNTER
pts mail order pharmacy will not have med for a few days, pt is currently out and wonders if we can send in a short supply to local pharm

## 2022-02-22 NOTE — TELEPHONE ENCOUNTER
ED Adult Note        Patient : Maira Guy Age: 73 year old Sex: male   MRN: [id number] Encounter Date: 3/16/2021 1252      History   HPI:   This patient was evaluated, managed, and treated in the context of COVID-19 pandemic.  As such, associated resources were utilized in patient care including goggles, mask,  and gloves  This is a 73-year-old male presenting to the emergency department today with complaint of palpitations that occurred at home earlier.  He states that he felt his heart was beating very strongly and he felt warm all over so checked his pulse and noted it to be 180 bpm.  The patient tried to rest and waited about 1 hour and his pulse was still elevated so he got himself together to come and be evaluated in the ED.  He states he checked his pulse and blood pressure prior to leaving his house and by that point his pulse had come down to 89 bpm, blood pressure has remained somewhat elevated for him as well.  He denies associated chest pain or dyspnea, he is not lightheaded, he does not have nausea or diaphoresis.  The patient was admitted to this hospital in April and May 2020 for COVID-19 infection.  At that time he started on the floor and was transferred to the intensive care unit for short stay.  He was also seen by cardiology as evidently while he was in the hospital he had an episode of supraventricular tachycardia which was treated with adenosine as well as a previous EKG here that is showing atrial fibrillation with rapid ventricular response.  Following discharge from the hospital he did follow-up with Dr. Khan from [facility] cardiology and was seen in the office a couple of times.  The patient had undergone 2D echocardiogram and nuclear medicine stress testing both of which were normal.  Since that time he has been doing well without any complaints until today.  ROS:  Review of systems as documented in the HPI.  All other ROS reviewed and are negative    Review of Systems   Constitutional:  Spoke to patient her weight is 114 today and yesterday. She took the metolazone 3 times in the last week. She wants to know if she needs to be seen sooner than may. Negative.    HENT: Negative.    Respiratory: Negative.    Cardiovascular: Positive for palpitations.   Gastrointestinal: Negative.    Endocrine: Negative.    Skin: Negative.    Neurological: Negative.    Psychiatric/Behavioral: Negative.         Allergies   Allergen Reactions   • Seasonal Other (See Comments)     dustmite       Current Discharge Medication List      Prior to Admission Medications    Details   ZINC SULFATE PO       Calcium Acetate, Phos Binder, (CALCIUM ACETATE PO)       vitamin D3 (CHOLECALCIFEROL) 1.25 mg (50,000 units) capsule       atorvastatin (LIPITOR) 20 MG tablet Take 20 mg by mouth daily.      apixaBAN (ELIQUIS) 2.5 MG Tab Take 1 tablet by mouth every 12 hours.  Qty: 84 tablet, Refills: 3      dilTIAZem (CARDIZEM CD) 120 MG 24 hr capsule Take 1 capsule by mouth daily.  Qty: 30 capsule, Refills: 0             Current Discharge Medication List          Past Medical History:   Diagnosis Date   • Acute respiratory failure with hypoxia (CMS/HCC) 4/26/2020   • Chronic urticaria 9/5/2014   • Elevated d-dimer 4/30/2020   • No known problems    • Pneumonia due to COVID-19 virus 4/26/2020   • Sinus tachycardia 4/26/2020   • SVT (supraventricular tachycardia) (CMS/[facility]) 4/29/2020       Past Surgical History:   Procedure Laterality Date   • HERNIA REPAIR         Family History   Problem Relation Age of Onset   • Diabetes Brother        Social History     Tobacco Use   • Smoking status: Former Smoker   • Smokeless tobacco: Never Used   Substance Use Topics   • Alcohol use: Not Currently   • Drug use: Never       Physical Exam     ED Triage Vitals [03/16/21 1251]   ED Triage Vitals Group      Temp 97.8 °F (36.6 °C)      Heart Rate 95      Resp 16      BP (!) 153/101      SpO2 97 %      EtCO2 mmHg       Height 5' 10\" (1.778 m)      Weight 209 lb 7 oz (95 kg)      Weight Scale Used ED Stated      BMI (Calculated) 30.05      IBW/kg (Calculated) 73     Vitals:    03/16/21 1251   BP: (!) 153/101   BP Location:  LUE - Left upper extremity   Patient Position: Supine   Pulse: 95   Resp: 16   Temp: 97.8 °F (36.6 °C)   TempSrc: Oral   SpO2: 97%   Weight: 95 kg (209 lb 7 oz)   Height: 5' 10\" (1.778 m)       Physical Exam  Constitutional:       General: He is not in acute distress.     Appearance: Normal appearance. He is normal weight. He is not ill-appearing.   HENT:      Head: Normocephalic and atraumatic.      Nose: Nose normal.      Mouth/Throat:      Mouth: Mucous membranes are moist.   Eyes:      Extraocular Movements: Extraocular movements intact.      Conjunctiva/sclera: Conjunctivae normal.      Pupils: Pupils are equal, round, and reactive to light.   Cardiovascular:      Rate and Rhythm: Normal rate and regular rhythm.      Pulses: Normal pulses.      Heart sounds: No murmur.   Pulmonary:      Effort: Pulmonary effort is normal.      Breath sounds: Normal breath sounds.   Abdominal:      General: Abdomen is flat. Bowel sounds are normal.      Palpations: Abdomen is soft.      Tenderness: There is no abdominal tenderness.   Musculoskeletal: Normal range of motion.         General: No swelling or tenderness.   Skin:     General: Skin is warm and dry.   Neurological:      General: No focal deficit present.      Mental Status: He is alert and oriented to person, place, and time.   Psychiatric:         Mood and Affect: Mood normal.         Behavior: Behavior normal.            ED Course     ED Course as of Mar 16 1630   Tue Mar 16, 2021   1428 Given the patient's history and previous EKG showing atrial fibrillation with RVR I discussed the case with S cardiology who is seen the patient in the office previously.  Dr. Dejesus is on-call, although the troponin is elevated this is likely related to his high heart rate earlier in the morning today.  If the troponin is trending down patient may be discharged home with event monitor and follow-up closely in the office as an outpatient    [SM]   1621 Repeat troponin has  gone up to 0.27 so I recontacted cardiology and we discussed starting the patient on heparin and admitting him for cardiac catheterization.  Patient however refuses to stay in the hospital.  Patient states that he needs to take care of his 12-year-old granddaughter at home and there is no one else but him to do this.  He cannot call anyone, he cannot stay but will return tomorrow for a cardiac catheterization.  He states that he will sign out AGAINST MEDICAL ADVICE and understands that he may die tonight of a cardiac arrest or have a heart attack.  He understands the risk of this.  He states that that scares him and he does not want that to happen but that he needs to go home to be with his 12-year-old and try to find somebody to care for her.  In an attempt to work with the patient cardiology contacted the cath lab and the director came to speak with the patient as we are trying to coordinate angiography for tomorrow.  The soonest that patient can go to a scheduled cardiac catheterization is at 1330 tomorrow so he will need to present to admissions at 11:30 AM.  Patient was notified of all of this, repeat EKG was also performed prior to discharge and is unchanged.  Patient was also given a dose of aspirin and Lopressor prior to his leaving    [SM]      ED Course User Index  [SM] Mira Riley MD       Procedures    Lab Results     Results for orders placed or performed during the hospital encounter of 03/16/21   Basic Metabolic Panel   Result Value Ref Range    Fasting Status      Sodium 135 135 - 145 mmol/L    Potassium 4.5 3.4 - 5.1 mmol/L    Chloride 105 98 - 107 mmol/L    Carbon Dioxide 26 21 - 32 mmol/L    Anion Gap 9 (L) 10 - 20 mmol/L    Glucose 102 (H) 65 - 99 mg/dL    BUN 19 6 - 20 mg/dL    Creatinine 0.94 0.67 - 1.17 mg/dL    Glomerular Filtration Rate 81 (L) >90 mL/min/1.73m2    BUN/ Creatinine Ratio 20 7 - 25    Calcium 10.6 (H) 8.4 - 10.2 mg/dL   Magnesium   Result Value Ref Range    Magnesium 2.6  (H) 1.7 - 2.4 mg/dL   D Dimer, Quantitative   Result Value Ref Range    D Dimer, Quantitative <0.19 <0.57 mg/L (FEU)   Troponin I Ultra Sensitive   Result Value Ref Range    Troponin I, Ultra Sensitive 0.11 (HH) <=0.04 ng/mL   CBC with Automated Differential (performable only)   Result Value Ref Range    WBC 9.4 4.2 - 11.0 K/mcL    RBC 4.97 4.50 - 5.90 mil/mcL    HGB 15.1 13.0 - 17.0 g/dL    HCT 45.4 39.0 - 51.0 %    MCV 91.3 78.0 - 100.0 fl    MCH 30.4 26.0 - 34.0 pg    MCHC 33.3 32.0 - 36.5 g/dL    RDW-CV 12.3 11.0 - 15.0 %    RDW-SD 40.8 39.0 - 50.0 fL     140 - 450 K/mcL    NRBC 0 <=0 /100 WBC    Neutrophil, Percent 67 %    Lymphocytes, Percent 20 %    Mono, Percent 10 %    Eosinophils, Percent 1 %    Basophils, Percent 1 %    Immature Granulocytes 1 %    Absolute Neutrophils 6.3 1.8 - 7.7 K/mcL    Absolute Lymphocytes 1.9 1.0 - 4.0 K/mcL    Absolute Monocytes 0.9 0.3 - 0.9 K/mcL    Absolute Eosinophils  0.1 0.0 - 0.5 K/mcL    Absolute Basophils 0.1 0.0 - 0.3 K/mcL    Absolute Immmature Granulocytes 0.1 0.0 - 0.2 K/mcL       EKG Results     EKG Interpretation 1242  Rate:96 bpm  Rhythm: normal sinus rhythm   No acute abnormalities noted when compared to an EKG dated 4/29/2020.  In addition atrial fibrillation that had been previously present on EKG has now resolved  Abnormality: no    EKG tracing interpreted by ED physician    Radiology Results     Imaging Results          XR CHEST PA AND LATERAL 2 VIEWS (Final result)  Result time 03/16/21 13:38:04    Final result                 Impression:      1.  No acute cardiopulmonary abnormality.                Electronically Signed by: RATNA REDMAN M.D.   Signed on: 3/16/2021 1:38 PM                Narrative:    EXAM: XR CHEST PA AND LATERAL 2 VIEWS    CLINICAL INDICATION: Palpitations.    COMPARISON: Chest 04/29/2020    FINDINGS:     PA upright and lateral chest.     The heart is nonenlarged.  No pulmonary edema.  No focal lung  consolidation or mass.  No  pleural effusion.  No pneumothorax.                                 [facility]     ED Medication Orders (From admission, onward)    None           MDM    Clinical Impression     No diagnosis found.     Disposition      No follow-up provider specified.    There is no disposition no dispo time  There is no comment                     Mira Riley MD  03/16/21 2031

## 2022-02-22 NOTE — TELEPHONE ENCOUNTER
Her weight can not be 114 do you mean 214   Let me see her next week but change metolazone to twice a week for now as she was taking it three times a week

## 2022-02-22 NOTE — PROGRESS NOTES
Subjective:      Patient ID: Cesilia Puri is a 77 y.o. female. CC: Patient presents for re-evaluation of chronic health problems including diabetes mellitus with chronic kidney disease, chronic heart failure, hypothyroidism, mitral valve repair and chronic atrial fibrillation. Inda Brittle HPI Pt is here for a follow up, med refill, test results. Since last appointment time patient was evaluated by cardiology on 2 separate occasions and metolazone medication was started twice daily and losartan was decreased down to 12.5 mg daily. She does have midodrine that she takes if her blood pressure becomes low although typically she does not feels low. Patient  has been monitoring blood pressure very closely at home and typically between 661-177 systolically. She is concerned that her weight has gone up over the last several months but it seemed to stabilize around 2 12-14. She was diagnosed as hypothyroid and her TSH level still slightly elevated. In regards her diabetic management she is having some hypoglycemic episodes prior to bed but through the day she not have any hypoglycemic episodes. She does not feel her blood sugars are low unless is less than 60. She typically does not take any short term insulin in the morning but her lunchtime blood sugar readings are well controlled prior to suppertime her blood sugars are slightly elevated. She typically takes 10 to 15 units of insulin. She continues on long-term insulin the morning. Eye exam current (within one year): Yes    Checks sugars at home: yes  Home blood sugar records: patient tests 4 time(s) per day  Any episodes of hypoglycemia?  No    Current medication use: taking as prescribed  Medication side effects: none     Current diet: well balanced  Current exercise:not active    Review of Systems  Patient Active Problem List   Diagnosis    Long term current use of anticoagulant    Hypercholesteremia    Generalized osteoarthrosis, involving multiple sites    Eosinophilic gastritis    Paroxysmal SVT [facility]supraventricular tachycardia) ([facility]    B12 deficiency    History of pulmonary embolism    Multiple pulmonary nodules    Type 2 diabetes mellitus with hyperglycemia, with long-term current use of insulin ([facility])    Asthma    PAF (paroxysmal atrial fibrillation) ([address]    Hypertension    Coronary artery disease due to lipid rich plaque    Nonrheumatic mitral valve regurgitation    S/P CABG x 3    Goiter    Primary osteoarthritis of both knees    Splenic infarct    Obesity, Class I, BMI 30-34.9    Chronic systolic CHF (congestive heart failure), NYHA class 3 ([facility])    Thoracic degenerative disc disease    Persistent atrial fibrillation ([facility])    S/P MVR (mitral valve repair)    Ischemic cardiomyopathy    Occlusion and stenosis of bilateral carotid arteries    Pneumatosis intestinalis of small intestine    Aortic valve stenosis    Deep vein thrombosis (DVT) of non-extremity vein    Acute on chronic systolic heart failure due to valvular disease ([facility])    Stage 4 chronic kidney disease ([facility])    Hypotension    Acute on chronic systolic CHF (congestive heart failure) ([facility])    Chronic diastolic heart failure ([facility])    Atherosclerosis of superior mesenteric artery [facility]    ICD (implantable cardioverter-defibrillator), dual, in situ    Type 2 diabetes mellitus with stage 3b chronic kidney disease, with long-term current use of insulin ([address]    COVID       Outpatient Medications Marked as Taking for the 2/22/22 encounter (Office Visit) with Tonny Driscoll MD   Medication Sig Dispense Refill    blood glucose test strips (FREESTYLE LITE) strip USE FOUR TIMES A  strip 3    metOLazone (ZAROXOLYN) 2.5 MG tablet 2.5 mg on tues and Friday and as directed 30 tablet 0    losartan (COZAAR) 25 MG tablet Take 0.5 tablets by mouth daily 45 tablet 1    mupirocin (BACTROBAN) 2 % cream Apply topically 3 times daily.  30 g 1    metoprolol succinate (TOPROL XL) 50 MG extended release tablet TAKE 1 TABLET DAILY 90 tablet 0    levothyroxine (SYNTHROID) 75 MCG tablet Take 1 tablet by mouth daily 90 tablet 1    insulin glargine (LANTUS SOLOSTAR) 100 UNIT/ML injection pen INJECT 20 UNITS IN THE MORNING 90 mL 1    insulin lispro, 1 Unit Dial, (HUMALOG KWIKPEN) 100 UNIT/ML SOPN Sliding scale-140-199-2 units, 200-249- 3 units, 250-300-4 units, >300 take 5 units 15 mL 1    albuterol sulfate  (90 Base) MCG/ACT inhaler USE 2 INHALATIONS EVERY 6 HOURS AS NEEDED FOR WHEEZING 25.5 g 2    nystatin (MYCOSTATIN) 982035 UNIT/ML suspension TAKE ONE TEASPOONFUL BY MOUTH FOUR TIMES A DAY FOR 5 DAYS 120 mL 1    ipratropium-albuterol (DUONEB) 0.5-2.5 (3) MG/3ML SOLN nebulizer solution Inhale 3 mLs into the lungs every 4 hours as needed for Shortness of Breath 120 each 0    amiodarone (CORDARONE) 200 MG tablet Take 0.5 tablets by mouth daily 45 tablet 0    spironolactone (ALDACTONE) 25 MG tablet Take 1 tablet by mouth 2 times daily 180 tablet 1    vitamin D (ERGOCALCIFEROL) 1.25 MG (68808 UT) CAPS capsule Take 1 capsule by mouth once a week 12 capsule 1    torsemide (DEMADEX) 20 MG tablet Take 2 tablets by mouth 2 times daily 360 tablet 1    midodrine (PROAMATINE) 2.5 MG tablet TAKE ONE TABLET BY MOUTH TWICE A DAY 60 tablet 5    FreeStyle Lancets MISC 1 each by Does not apply route 4 times daily 200 each 5    Blood Glucose Monitoring Suppl (FREESTYLE LITE) GAETANO 1 Device by Does not apply route 4 times daily      oxyCODONE (ROXICODONE) 5 MG immediate release tablet Take 0.5 mg by mouth daily.       montelukast (SINGULAIR) 10 MG tablet TAKE 1 TABLET NIGHTLY 90 tablet 3    potassium chloride (KLOR-CON M) 10 MEQ extended release tablet TAKE 1 TABLET DAILY 90 tablet 3    warfarin (COUMADIN) 5 MG tablet TAKE 1 TABLET DAILY 100 tablet 3    Insulin Pen Needle (BD PEN NEEDLE JANNET U/F) 32G X 4 MM MISC USE 1 PEN NEEDLE FOUR TIMES A  each 3    magnesium oxide (MAG-OX) 400 MG tablet Take 1 tablet by mouth daily 90 tablet 3    ACETAMINOPHEN PO Take 500 mg by mouth every 6 hours as needed       albuterol (PROVENTIL) (2.5 MG/3ML) 0.083% nebulizer solution Take 3 mLs by nebulization every 6 hours as needed for Wheezing 120 each 3    polyethylene glycol (GLYCOLAX) 17 GM/SCOOP powder Take 17 g by mouth daily       omeprazole (PRILOSEC) 20 MG delayed release capsule Take 20 mg by mouth daily      ondansetron (ZOFRAN) 4 MG tablet Take 1 tablet by mouth 3 times daily as needed for Nausea or Vomiting 30 tablet 0    aspirin 81 MG chewable tablet Take 1 tablet by mouth daily 30 tablet 3    vitamin B-12 500 MCG tablet Take 1 tablet by mouth daily 30 tablet 3       Allergies   Allergen Reactions    Rqpjopiz-Gvwbynt-Otulcx [Fluocinolone] Shortness Of Breath    Ciprofloxacin Shortness Of Breath    Diovan [Valsartan] Shortness Of Breath    Flagyl [Metronidazole] Shortness Of Breath     Has taken diflucan at home 12/7/15    Metformin And Related [Metformin And Related] Shortness Of Breath    Benazepril      Other reaction(s): Not Recorded    Morphine      Bad reaction. \"makes her feel horrible\".  Saxagliptin      Other reaction(s): Not Recorded    Levaquin [Levofloxacin] Rash       Social History     Tobacco Use    Smoking status: Never Smoker    Smokeless tobacco: Never Used   Substance Use Topics    Alcohol use: No       /68 (Site: Left Upper Arm, Position: Sitting, Cuff Size: Large Adult)   Pulse 76   Temp 97.2 °F (36.2 °C) (Infrared)   Resp 16   Wt 216 lb 8 oz (98.2 kg)   SpO2 96%   BMI 34.94 kg/m²     Objective:   Physical Exam  Constitutional:       General: She is not in acute distress. Appearance: She is well-developed. Neck:      Vascular: No carotid bruit. Cardiovascular:      Rate and Rhythm: Normal rate and regular rhythm. Pulses:           Dorsalis pedis pulses are 2+ on the right side and 2+ on the left side.         Posterior tibial pulses are 2+ on the right side and 2+ on the left side. Heart sounds: Murmur (Right sternal border) heard. Systolic murmur is present with a grade of 2/6. No friction rub. No gallop. Pulmonary:      Effort: Pulmonary effort is normal.      Breath sounds: Normal breath sounds. Abdominal:      General: Bowel sounds are increased. There is distension. Palpations: Abdomen is soft. Tenderness: There is abdominal tenderness in the epigastric area. There is no right CVA tenderness or left CVA tenderness. Musculoskeletal:         General: No tenderness. Normal range of motion. Right lower leg: Edema (Trace ankle edema bilaterally) present. Left lower leg: Edema present. Right foot: Normal.      Left foot: Normal.   Skin:     Findings: No rash. Neurological:      Mental Status: She is alert and oriented to person, place, and time. Sensory: Sensation is intact. Motor: Motor function is intact. Deep Tendon Reflexes: Reflexes are normal and symmetric. Comments: 12 point monofilament test normal    Psychiatric:         Behavior: Behavior is cooperative. Assessment:      Alexandro Gilford was seen today for follow-up, diabetes, hypertension and hyperlipidemia. Diagnoses and all orders for this visit:    Type 2 diabetes mellitus with stage 3b chronic kidney disease, with long-term current use of insulin ([facility])  -     Hemoglobin A1C; Future  -     Basic Metabolic Panel; Future    Chronic systolic CHF (congestive heart failure), NYHA class 3 ([facility])  -     Brain Natriuretic Peptide; Future    Acquired hypothyroidism  -     TSH; Future    Nonrheumatic mitral valve regurgitation    Persistent atrial fibrillation ([facility])    Other orders  -     nystatin (MYCOSTATIN) 123441 UNIT/ML suspension; TAKE ONE TEASPOONFUL BY MOUTH FOUR TIMES A DAY FOR 5 DAYS  -     levothyroxine (SYNTHROID) 100 MCG tablet;  Take 1 tablet by mouth daily            Plan:      Reviewed labs and test results with patient. Diabetes extremely well controlled but concerned that she is taking too much short-term insulin at suppertime. Recommend she decrease down by 2 units. Kidney failure has worsened with creatinine increasing from 1.5-1.8 up to 2.3. Medications were just recently adjusted and this will need to be reassessed. Obviously continue to be on a low-salt diet and avoid anti-inflammatory medications. For the hypothyroidism adjustment of thyroid medication as noted above. Continue with long-term anticoagulation. Patient received counseling on the following healthy behaviors: nutrition and exercise     Patient given educational materials     Health maintenance updated    Discussed use, benefit, and side effects of prescribed medications. Barriers to medication compliance addressed. All patient questions answered. Pt voiced understanding. Patient needs RTC in 3 months. Medical decision making of moderate complexity. Please note that this chart was generated using Dragon dictation software. Although every effort was made to ensure the accuracy of this automated transcription, some errors in transcription may have occurred.

## 2022-02-24 ENCOUNTER — TELEPHONE (OUTPATIENT)
Dept: FAMILY MEDICINE CLINIC | Age: 77
End: 2022-02-24

## 2022-02-24 NOTE — TELEPHONE ENCOUNTER
----- Message from Dina Rich sent at 2/24/2022  2:45 PM EST -----  Subject: Message to Provider    QUESTIONS  Information for Provider?  would like a call from the office as   soon as possible   ---------------------------------------------------------------------------  --------------  [address]  What is the best way for the office to contact you? OK to leave message on   voicemail  Preferred Call Back Phone Number? [phone]  ---------------------------------------------------------------------------  --------------  SCRIPT ANSWERS  Relationship to Patient? Other  Representative Name? [email or web address] ()  Is the Representative on the appropriate HIPAA document in Epic?  Yes

## 2022-02-25 ENCOUNTER — ANTI-COAG VISIT (OUTPATIENT)
Dept: FAMILY MEDICINE CLINIC | Age: 77
End: 2022-02-25

## 2022-02-25 LAB — INR BLD: 2.2

## 2022-03-02 ENCOUNTER — OFFICE VISIT (OUTPATIENT)
Dept: CARDIOLOGY CLINIC | Age: 77
End: 2022-03-02
Payer: MEDICARE

## 2022-03-02 ENCOUNTER — HOSPITAL ENCOUNTER (OUTPATIENT)
Dept: ONCOLOGY | Age: 77
Setting detail: INFUSION SERIES
Discharge: HOME | End: 2022-03-02
Payer: MEDICARE

## 2022-03-02 VITALS
SYSTOLIC BLOOD PRESSURE: 90 MMHG | HEIGHT: 66 IN | BODY MASS INDEX: 34.72 KG/M2 | WEIGHT: 216 LBS | HEART RATE: 66 BPM | DIASTOLIC BLOOD PRESSURE: 56 MMHG | OXYGEN SATURATION: 95 %

## 2022-03-02 VITALS
SYSTOLIC BLOOD PRESSURE: 96 MMHG | TEMPERATURE: 97 F | HEART RATE: 64 BPM | RESPIRATION RATE: 16 BRPM | DIASTOLIC BLOOD PRESSURE: 55 MMHG

## 2022-03-02 DIAGNOSIS — N28.9 RENAL INSUFFICIENCY: ICD-10-CM

## 2022-03-02 DIAGNOSIS — I95.9 HYPOTENSION, UNSPECIFIED HYPOTENSION TYPE: ICD-10-CM

## 2022-03-02 DIAGNOSIS — I48.0 PAF (PAROXYSMAL ATRIAL FIBRILLATION) (HCC): ICD-10-CM

## 2022-03-02 DIAGNOSIS — I35.0 NONRHEUMATIC AORTIC VALVE STENOSIS: ICD-10-CM

## 2022-03-02 DIAGNOSIS — E66.01 SEVERE OBESITY (BMI 35.0-39.9) WITH COMORBIDITY (HCC): ICD-10-CM

## 2022-03-02 DIAGNOSIS — I50.22 CHRONIC SYSTOLIC CHF (CONGESTIVE HEART FAILURE), NYHA CLASS 3 (HCC): ICD-10-CM

## 2022-03-02 DIAGNOSIS — Z95.1 S/P CABG X 3: ICD-10-CM

## 2022-03-02 DIAGNOSIS — I50.22 CHRONIC SYSTOLIC CHF (CONGESTIVE HEART FAILURE), NYHA CLASS 3 (HCC): Primary | ICD-10-CM

## 2022-03-02 LAB
ANION GAP SERPL CALCULATED.3IONS-SCNC: 10 MMOL/L (ref 3–16)
BUN BLDV-MCNC: 34 MG/DL (ref 7–20)
CALCIUM SERPL-MCNC: 8.7 MG/DL (ref 8.3–10.6)
CHLORIDE BLD-SCNC: 101 MMOL/L (ref 99–110)
CO2: 28 MMOL/L (ref 21–32)
CREAT SERPL-MCNC: 1.8 MG/DL (ref 0.6–1.2)
GFR AFRICAN AMERICAN: 33
GFR NON-AFRICAN AMERICAN: 27
GLUCOSE BLD-MCNC: 163 MG/DL (ref 70–99)
HCT VFR BLD CALC: 35.9 % (ref 36–48)
HEMOGLOBIN: 11.7 G/DL (ref 12–16)
MCH RBC QN AUTO: 29.5 PG (ref 26–34)
MCHC RBC AUTO-ENTMCNC: 32.7 G/DL (ref 31–36)
MCV RBC AUTO: 90.3 FL (ref 80–100)
PDW BLD-RTO: 16.2 % (ref 12.4–15.4)
PLATELET # BLD: 207 K/UL (ref 135–450)
PMV BLD AUTO: 8.4 FL (ref 5–10.5)
POTASSIUM SERPL-SCNC: 4.2 MMOL/L (ref 3.5–5.1)
PRO-BNP: 1698 PG/ML (ref 0–449)
RBC # BLD: 3.97 M/UL (ref 4–5.2)
SODIUM BLD-SCNC: 139 MMOL/L (ref 136–145)
WBC # BLD: 4.7 K/UL (ref 4–11)

## 2022-03-02 PROCEDURE — 96374 THER/PROPH/DIAG INJ IV PUSH: CPT

## 2022-03-02 PROCEDURE — 83880 ASSAY OF NATRIURETIC PEPTIDE: CPT

## 2022-03-02 PROCEDURE — 6360000002 HC RX W HCPCS: Performed by: CLINICAL NURSE SPECIALIST

## 2022-03-02 PROCEDURE — 80048 BASIC METABOLIC PNL TOTAL CA: CPT

## 2022-03-02 PROCEDURE — 99215 OFFICE O/P EST HI 40 MIN: CPT | Performed by: CLINICAL NURSE SPECIALIST

## 2022-03-02 PROCEDURE — 2580000003 HC RX 258: Performed by: CLINICAL NURSE SPECIALIST

## 2022-03-02 PROCEDURE — 85027 COMPLETE CBC AUTOMATED: CPT

## 2022-03-02 PROCEDURE — 99211 OFF/OP EST MAY X REQ PHY/QHP: CPT

## 2022-03-02 RX ORDER — SODIUM CHLORIDE 0.9 % (FLUSH) 0.9 %
5-40 SYRINGE (ML) INJECTION ONCE
Status: COMPLETED | OUTPATIENT
Start: 2022-03-02 | End: 2022-03-02

## 2022-03-02 RX ORDER — FUROSEMIDE 10 MG/ML
80 INJECTION INTRAMUSCULAR; INTRAVENOUS ONCE
Status: COMPLETED | OUTPATIENT
Start: 2022-03-02 | End: 2022-03-02

## 2022-03-02 RX ADMIN — FUROSEMIDE 80 MG: 10 INJECTION, SOLUTION INTRAMUSCULAR; INTRAVENOUS at 10:28

## 2022-03-02 RX ADMIN — Medication 10 ML: at 10:28

## 2022-03-02 NOTE — PROGRESS NOTES
[address]  Progress Note    Primary Care Doctor:  Jay De Leon MD    Chief Complaint   Patient presents with    Congestive Heart Failure    Cough    Shortness of Breath    Bloated        History of Present Illness:  77 y.o. female with history of CAD/CABG in 2018, PAF with maze 2018, HTN, HLD, DVT/PE on coumadin, 2 CVs unsuccessful  -3/10/21 for small bowel pneumatosis with loculated pneumoperitoneum (IV zoysn and TPN), acute on chronic sHF (torsemide decreased at discharge, aldactone was held and coreg dose decreased due to hypotension), TORIBIO on CKD, PAF  ICD placed 2021  covid (antibodies, steroids and antibiotics). She was on prednisone  to  (off for 2 days). I had the pleasure of seeing Amaury Angel in follow up for sHF. She is in a wheel chair with Rosemary Andrade (). I forgot to do an optival today. Her weight has gone from 208->215. He has been out of metolazone for 1.5 weeks (script sent to mail order which is being mailed today). She has no leg edema but feels like her abdomen is very distended and firm. She is having more shortness of breath, no chest pain, palpitations or lightheadedness. She saw nephrology in the hospital but not as an outpatient. Past Medical History:   has a past medical history of Asthma, Atrial fibrillation ([facility] [address] Eosinophilia, Hemoptysis, HIGH CHOLESTEROL, Hx of blood clots, Hypertension, Irregular heart beat, Other specified gastritis without mention of hemorrhage, Palpitations, Skin cancer, and Type II or unspecified type diabetes mellitus without mention of complication, not stated as uncontrolled. Surgical History:   has a past surgical history that includes Cholecystectomy;  section; Colonoscopy (2017); skin biopsy; bronchoscopy (2016); Coronary artery bypass graft (2018); Mitral valve replacement (2018); transesophageal echocardiogram (2018);  Tunneled venous catheter placement (Left, 08/23/2018); Cardiac catheterization (08/16/2018); Insertable Cardiac Monitor (Left, 08/16/2018); Colonoscopy (01/17/2014); Hysterectomy; Upper gastrointestinal endoscopy (N/A, 10/10/2018); Colonoscopy (10/10/2018); Colonoscopy (N/A, 8/7/2020); Pain management procedure (N/A, 12/18/2020); Pain management procedure (Bilateral, 1/18/2021); and Cardiac catheterization (5/2 and 5/3 2021). Social History:   reports that she has never smoked. She has never used smokeless tobacco. She reports that she does not drink alcohol and does not use drugs. Family History:   Family History   Problem Relation Age of Onset    Cancer Father     Asthma Mother     Hypertension Mother     Heart Disease Mother     High Blood Pressure Mother        Home Medications:  Prior to Admission medications    Medication Sig Start Date End Date Taking?  Authorizing Provider   levothyroxine (SYNTHROID) 100 MCG tablet Take 1 tablet by mouth daily 2/22/22  Yes Amalia Crawford MD   metoprolol succinate (TOPROL XL) 50 MG extended release tablet TAKE 1 TABLET DAILY 1/12/22  Yes Amalia Crawford MD   insulin glargine (LANTUS SOLOSTAR) 100 UNIT/ML injection pen INJECT 20 UNITS IN THE MORNING 11/22/21  Yes Amalia Crawford MD   insulin lispro, 1 Unit Dial, (HUMALOG KWIKPEN) 100 UNIT/ML SOPN Sliding scale-140-199-2 units, 200-249- 3 units, 250-300-4 units, >300 take 5 units 11/22/21  Yes Amalia Crawford MD   albuterol sulfate  (90 Base) MCG/ACT inhaler USE 2 INHALATIONS EVERY 6 HOURS AS NEEDED FOR WHEEZING 11/19/21  Yes Amalia Crawford MD   amiodarone (CORDARONE) 200 MG tablet Take 0.5 tablets by mouth daily 11/2/21  Yes SOLEDAD Montalvo - CNP   spironolactone (ALDACTONE) 25 MG tablet Take 1 tablet by mouth 2 times daily 10/6/21  Yes Violeta Esposito APRN - CNS   vitamin D (ERGOCALCIFEROL) 1.25 MG (83272 UT) CAPS capsule Take 1 capsule by mouth once a week 10/5/21  Yes Violeta M Giesting, APRN - CNS   torsemide (DEMADEX) 20 MG tablet Take 2 tablets by mouth 2 times daily 9/20/21  Yes Jill Sultana MD   midodrine (PROAMATINE) 2.5 MG tablet TAKE ONE TABLET BY MOUTH TWICE A DAY  Patient taking differently: as needed  9/16/21  Yes SOLEDAD Gonzalez   oxyCODONE (ROXICODONE) 5 MG immediate release tablet Take 0.5 mg by mouth daily. Yes Historical Provider, MD   montelukast (SINGULAIR) 10 MG tablet TAKE 1 TABLET NIGHTLY 8/3/21  Yes Jill Sultana MD   potassium chloride (KLOR-CON M) 10 MEQ extended release tablet TAKE 1 TABLET DAILY 8/3/21  Yes Jill Sultana MD   warfarin (COUMADIN) 5 MG tablet TAKE 1 TABLET DAILY 8/3/21  Yes Jill Sultana MD   magnesium oxide (MAG-OX) 400 MG tablet Take 1 tablet by mouth daily 5/10/21  Yes Noy Tejeda MD   ACETAMINOPHEN PO Take 500 mg by mouth every 6 hours as needed    Yes Historical Provider, MD   polyethylene glycol (GLYCOLAX) 17 GM/SCOOP powder Take 17 g by mouth daily    Yes Historical Provider, MD   omeprazole (PRILOSEC) 20 MG delayed release capsule Take 20 mg by mouth daily   Yes Historical Provider, MD   ondansetron (ZOFRAN) 4 MG tablet Take 1 tablet by mouth 3 times daily as needed for Nausea or Vomiting 3/26/20  Yes Jill Sultana MD   aspirin 81 MG chewable tablet Take 1 tablet by mouth daily 6/7/19  Yes Jill Sultana MD   vitamin B-12 500 MCG tablet Take 1 tablet by mouth daily 10/12/18  Yes Maryhelen Goodpasture, MD   nystatin (MYCOSTATIN) 865333 UNIT/ML suspension TAKE ONE TEASPOONFUL BY MOUTH FOUR TIMES A DAY FOR 5 DAYS  Patient not taking: Reported on 3/2/2022 2/22/22   Jill Sultana MD   metOLazone (ZAROXOLYN) 2.5 MG tablet 2.5 once a week  Patient not taking: Reported on 3/2/2022 2/22/22   SOLEDAD Jensen   blood glucose test strips (FREESTYLE LITE) strip USE FOUR TIMES A DAY 2/15/22   Jill Sultana MD   mupirocin (BACTROBAN) 2 % cream Apply topically 3 times daily.   Patient not taking: Reported on 3/2/2022 2/1/22 3/3/22 Kaylynn Blackburn MD   ipratropium-albuterol (DUONEB) 0.5-2.5 (3) MG/3ML SOLN nebulizer solution Inhale 3 mLs into the lungs every 4 hours as needed for Shortness of Breath  Patient not taking: Reported on 3/2/2022 11/15/21   Kaylynn Blackburn MD   FreeStyle Lancets MISC 1 each by Does not apply route 4 times daily 9/7/21   Kaylynn Blackburn MD   Blood Glucose Monitoring Suppl (FREESTYLE LITE) GAETANO 1 Device by Does not apply route 4 times daily    Historical Provider, MD   Insulin Pen Needle (BD PEN NEEDLE JANNET U/F) 32G X 4 MM MISC USE 1 PEN NEEDLE FOUR TIMES A DAY 6/14/21   Kaylynn Blackburn MD   albuterol (PROVENTIL) (2.5 MG/3ML) 0.083% nebulizer solution Take 3 mLs by nebulization every 6 hours as needed for Wheezing  Patient not taking: Reported on 3/2/2022 6/2/20   Kaylynn Blackburn MD        Allergies:  Pjejutap-wyfrtah-bfppmp [fluocinolone], Ciprofloxacin, Diovan [valsartan], Flagyl [metronidazole], Metformin and related [metformin and related], Benazepril, Morphine, Saxagliptin, and Levaquin [levofloxacin]     Review of Systems:   · Constitutional: there has been no unanticipated weight loss. There's been no change in energy level, sleep pattern, or activity level. · Eyes: No visual changes or diplopia. No scleral icterus. · ENT: No Headaches, hearing loss or vertigo. No mouth sores or sore throat. · Cardiovascular: Reviewed in HPI  · Respiratory: No cough or wheezing, no sputum production. No hematemesis. · Gastrointestinal: No abdominal pain, appetite loss, blood in stools. No change in bowel or bladder habits. · Genitourinary: No dysuria, trouble voiding, or hematuria. · Musculoskeletal:  No gait disturbance, weakness or joint complaints. · Integumentary: No rash or pruritis. · Neurological: No headache, diplopia, change in muscle strength, numbness or tingling. No change in gait, balance, coordination, mood, affect, memory, mentation, behavior.   · Psychiatric: No anxiety, no depression. · Endocrine: No malaise, fatigue or temperature intolerance. No excessive thirst, fluid intake, or urination. No tremor. · Hematologic/Lymphatic: No abnormal bruising or bleeding, blood clots or swollen lymph nodes. · Allergic/Immunologic: No nasal congestion or hives. Physical Examination:    Vitals:    03/02/22 0913   BP: (!) 90/56   Pulse: 66   SpO2: 95%   Weight: 216 lb (98 kg)   Height: 5' 6\" (1.676 m)        Constitutional and General Appearance: Warm and dry, no apparent distress, normal coloration  HEENT:  Normocephalic, atraumatic  Respiratory:  · Normal excursion and expansion without use of accessory muscles  · Resp Auscultation: Normal breath sounds without dullness  Cardiovascular:  · The apical impulses not displaced  · Heart tones are crisp and normal  · JVP normal cm H2O  · regular rate and rhythm  · Peripheral pulses are symmetrical and full  · There is no clubbing, cyanosis of the extremities.   · No ankle edema  · Pedal Pulses: 2+ and equal   Abdomen: distended and firm  · No masses or tenderness  · Liver/Spleen: No Abnormalities Noted  Neurological/Psychiatric:  · Alert and oriented in all spheres  · Moves all extremities well  · Exhibits normal gait balance and coordination  · No abnormalities of mood, affect, memory, mentation, or behavior are noted    Lab Data:    CBC:   Lab Results   Component Value Date    WBC 4.7 03/02/2022    WBC 11.2 12/09/2021    WBC 4.8 10/05/2021    RBC 3.97 03/02/2022    RBC 4.20 12/09/2021    RBC 3.95 10/05/2021    HGB 11.7 03/02/2022    HGB 12.5 12/09/2021    HGB 11.8 10/05/2021    HCT 35.9 03/02/2022    HCT 37.9 12/09/2021    HCT 36.2 10/05/2021    MCV 90.3 03/02/2022    MCV 90.3 12/09/2021    MCV 91.5 10/05/2021    RDW 16.2 03/02/2022    RDW 15.0 12/09/2021    RDW 15.6 10/05/2021     03/02/2022     12/09/2021     10/05/2021     BMP:  Lab Results   Component Value Date     03/02/2022     02/21/2022     01/10/2022    K 4.2 03/02/2022    K 4.1 02/21/2022    K 4.0 01/10/2022    K 3.5 04/21/2021    K 3.7 04/20/2021    K 4.0 04/19/2021     03/02/2022    CL 98 02/21/2022    CL 97 01/10/2022    CO2 28 03/02/2022    CO2 26 02/21/2022    CO2 30 01/10/2022    PHOS 4.0 05/05/2021    PHOS 3.7 05/04/2021    PHOS 3.6 05/03/2021    BUN 34 03/02/2022    BUN 42 02/21/2022    BUN 41 01/10/2022    CREATININE 1.8 03/02/2022    CREATININE 2.3 02/21/2022    CREATININE 1.8 01/10/2022     BNP:   Lab Results   Component Value Date    PROBNP 1,698 03/02/2022    PROBNP 1,307 02/21/2022    PROBNP 2,336 01/10/2022     Cardiac Imaging:  [facility] Preliminary 5/4/2021 Dr Mandeep Ortiz  AV - area ~ 1.5, opens adequately, not severe aortic stenosis     [facility] 5/3/2021 Dr Mandeep Ortiz  Artery Findings/Result   LM Normal   LAD 50% mid, 70% mid, competitive flow distal from LIMA   Cx OM1 20% ostial to prox, superior branch mid OM1 50% ostial   RI N/A   S-D-RPL patent   L-LAD patent   RCA 50-60% distal, very heavily calcified   LVEDP 15   AV Peak to peak gradient 36mmHg, valve crossed easily with pigtail. LVG NA      Intervention:         None     Post Cath Dx:       Patent grafts     Echo 4/20/21  Summary   Overall left ventricular systolic function is severely depressed .   Ejection fraction is visually estimated to be 10-15 %.  E/e'= 23.2 GLS=-3.4   Moderately dilated left ventricle.   There is severe diffuse hypokinesis.   Indeterminate diastolic function.   A bioprosthetic mitral valve is well seated with peak velocity of 1.59m/s   and a mean gradient of 3mmHg.   The left atrium is moderately dilated.   Mild aortic stenosis with a peak velocity of 2.5m/s and a mean pressure   gradient of 14mmHg.   The aortic valve is thickened/calcified with decreased leaflet mobility   consistent with aortic stenosis.   Mild to moderate tricuspid regurgitation.   Estimated pulmonary artery systolic pressure is mildly to moderately   elevated at 46 mmHg assuming a right atrial pressure of 8 mmHg    11/30/2020 Dobutamine Echo   Dobutamine echocardiogram for aortic stenosis.   Very technically limited exam due to atrial fibrillation.   Baseline echocardiogram shows severe left ventricular dysfunction with   anterior, lateral and apical hypokinesis with an ejection fraction of 20-25%.   There is no improvement in wall motion with low or high dose dobutamine   suggestive of nonviable myocardium.      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 After dobutamine stress the mean AV   gradient rises to 20mmHg with 20mcg of dobutamine consistent with mild to   moderate aortic stenosis. Prosthetic mitral valve with a mean gradient of 7mmHg        [facility] 10/21/2020  Mildly dilated left ventricular size and normal wall thickness. Global left ventricular function is severely decreased with ejection fraction estimated at 25%. Patient is in atrial fibrillation during procedure.      The bioprosthetic mitral valve is well seated with a mean gradient of 5mmHg and maximum pressure gradient of 15 mmHg with heart rate of 89 bpm. Pressure half time of 82 ms. There is trivial mitral regurgitation.      Left atrial enlargement. Spontaneous echo contrast seen in the left atrium. A large stump of the left atrial appendage with no thrombus noted. Patient has history of surgical ligation of the left atrial appendage. There are spontaneous echo contrast in the atrial appendage.      The aortic valve is thickened/calcified with decreased leaflet mobility consistent with aortic stenosis.  There is trivial aortic insufficiency.      There is moderate tricuspid regurgitation.     ECHO 9/15/20  Left ventricular cavity size is dilated. There is normal wall thickness with a moderately severe reduction in   systolic function.   LV ejection fraction is visually estimated to be 25-30%.   Indeterminate diastolic function.   The right ventricle is not well visualized but appears to be normal in size with moderately reduced function.   The left atrium is moderately dilated.   A bioprosthetic mitral valve with a mean gradient of 7 mmHg. This may be a normal gradient for this valve but could suggest mild stenosis.   Trivial mitral regurgitation.   The aortic valve is thickened/calcified with a mean gradient of 16mmHg consistent with at least mild aortic stenosis. This is likely underestimated due to low cardiac output secondary to LV dysfunction.   No significant aortic valve regurgitation.   Moderate tricuspid regurgitation with RVSP of 48 mmHg.     [facility] 11/1/2019  Normal left ventricular cavity size and wall thickness. Global left ventricular function is moderate-to-severely decreased with ejection fraction estimated from 35% to 40%. Severe apical akinesis noted.      The bioprosthetic mitral valve is well seated with a mean gradient of 2mmHg and maximum pressure gradient of 5 mmHg. There is trivial mitral regurgitation.      Left atrial enlargement. Spontaneous echo contrast seen in the left atrium.   Stump of the left atrial appendage noted with no thrombus.      The aortic valve is thickened/calcified with decreased leaflet mobility consistent with aortic stenosis. There is trivial aortic insufficiency    Assessment:    1. Chronic systolic heart failure due to valvular disease (HCC) on arb, BB and aldosterone antagonist   2. PAF (paroxysmal atrial fibrillation) (HCC) on coumadin in nsr   3. S/P CABG x 3    4. Nonrheumatic aortic valve stenosis    5. Hypotension on midodrine   6. Obesity BMI 35-39.9      Plan:   Patient Instructions   1. Stop losartan  2. Check blood work in infusion center along with 80 mg of IV lasix today  3. Referral to Dr Justyn Espino (kidney specialist)  4. Continue all other medications  5. Set up with mychart  6. Let me know if you do not receive metolazone  7.   RTO may with echo    >40 minutes spent in reviewing, examining and coordinating with Infusion center plan of care and treatment.     I appreciate the opportunity of cooperating in the care of this individual.    SOLEDAD Robles - CNS, CNS, 3/2/2022, 1:52 PM

## 2022-03-02 NOTE — PATIENT INSTRUCTIONS
1.  Stop losartan  2. Check blood work in infusion center along with 80 mg of IV lasix today  3. Referral to Dr Nimo Chanel (kidney specialist)  4. Continue all other medications  5. Set up with mychart  6. Let me know if you do not receive metolazone  7.   RTO may with echo

## 2022-03-02 NOTE — PROGRESS NOTES
Patient to department for outpatient labs and lasix. Here from cardiology office. Labs drawn followed by lasix  80 mg at 20 mg per minute. Tolerated well. Patient aware that she will have urinary urgency and frequency. Also aware that this tx may lower her b/p and he may feel dizzy upon standing. All questions answered. To follow up with cardiology office. Discharged per W/C with .

## 2022-03-03 ENCOUNTER — TELEPHONE (OUTPATIENT)
Dept: CARDIOLOGY CLINIC | Age: 77
End: 2022-03-03

## 2022-03-03 NOTE — TELEPHONE ENCOUNTER
Patient stated the lasix did not work she weighs the same. They did sign up for mychart but cant get back in. No metolazone yet wont know until later today.

## 2022-03-03 NOTE — TELEPHONE ENCOUNTER
----- Message from SOLEDAD Espinal - CNS sent at 3/3/2022  8:59 AM EST -----  Labs showed increased fluid level  See how she is doing after the dose of IV lasix given yesterday  Did the  get signed up for my chart and did the metolazone come yet?   thanks

## 2022-03-07 LAB
CATARACTS: POSITIVE
DIABETIC RETINOPATHY: NORMAL
GLAUCOMA: NEGATIVE
INTRAOCULAR PRESSURE EYE: NORMAL
VISUAL ACUITY DISTANCE LEFT EYE: NORMAL
VISUAL ACUITY DISTANCE RIGHT EYE: NORMAL

## 2022-03-07 RX ORDER — TORSEMIDE 20 MG/1
TABLET ORAL
Qty: 360 TABLET | Refills: 1 | Status: SHIPPED | OUTPATIENT
Start: 2022-03-07 | End: 2022-06-01

## 2022-03-07 NOTE — TELEPHONE ENCOUNTER
Medication:   Requested Prescriptions     Pending Prescriptions Disp Refills    torsemide (DEMADEX) 20 MG tablet [Pharmacy Med Name: TORSEMIDE TABS 20MG] 360 tablet 3     Sig: TAKE 2 TABLETS TWICE A DAY      Last Filled:      Patient Phone Number: [phone] (home)     Last appt: 2/22/2022   Next appt: 5/25/2022    Last OARRS:   RX Monitoring 3/1/2019   Attestation The Prescription Monitoring Report for this patient was reviewed today.    Periodic Controlled Substance Monitoring -     PDMP Monitoring:    Last PDMP [facility] SYSTEM as Reviewed [facility]):  Review User Review Instant Review Result   Marybel Lipoma 8/17/2021  8:33 AM Reviewed PDMP [1]     Preferred Pharmacy:   [facility] , [address] [phone] - F [phone]  [address] [place] [address]  Phone: [phone] Fax: [phone]

## 2022-03-09 ENCOUNTER — HOSPITAL ENCOUNTER (OUTPATIENT)
Dept: WOMENS IMAGING | Age: 77
Discharge: HOME | End: 2022-03-09
Payer: MEDICARE

## 2022-03-09 VITALS — BODY MASS INDEX: 31.22 KG/M2 | WEIGHT: 206 LBS | HEIGHT: 68 IN

## 2022-03-09 DIAGNOSIS — Z12.31 ENCOUNTER FOR SCREENING MAMMOGRAM FOR MALIGNANT NEOPLASM OF BREAST: ICD-10-CM

## 2022-03-09 PROCEDURE — 77063 BREAST TOMOSYNTHESIS BI: CPT

## 2022-03-10 RX ORDER — AMIODARONE HYDROCHLORIDE 200 MG/1
TABLET ORAL
Qty: 60 TABLET | Refills: 5 | Status: SHIPPED | OUTPATIENT
Start: 2022-03-10

## 2022-03-11 ENCOUNTER — TELEPHONE (OUTPATIENT)
Dept: FAMILY MEDICINE CLINIC | Age: 77
End: 2022-03-11

## 2022-03-11 ENCOUNTER — ANTI-COAG VISIT (OUTPATIENT)
Dept: FAMILY MEDICINE CLINIC | Age: 77
End: 2022-03-11

## 2022-03-11 LAB — INR BLD: 3.1

## 2022-03-24 RX ORDER — ERGOCALCIFEROL 1.25 MG/1
CAPSULE ORAL
Qty: 12 CAPSULE | Refills: 1 | Status: SHIPPED | OUTPATIENT
Start: 2022-03-24 | End: 2022-09-14

## 2022-03-25 ENCOUNTER — TELEPHONE (OUTPATIENT)
Dept: FAMILY MEDICINE CLINIC | Age: 77
End: 2022-03-25

## 2022-03-25 NOTE — TELEPHONE ENCOUNTER
----- Message from Katey Edwin sent at 3/25/2022 11:22 AM EDT -----  Subject: Referral Request    QUESTIONS   Reason for referral request? Patients  called in he states the INR   machine is broke and they cant get a new machine for 5 days. He would like   to know if she should go elsewhere please call   Has the physician seen you for this condition before? No   Preferred Specialist (if applicable)? Do you already have an appointment scheduled? Additional Information for Provider?   ---------------------------------------------------------------------------  --------------  CALL BACK INFO  What is the best way for the office to contact you? OK to leave message on   voicemail  Preferred Call Back Phone Number?  [phone]

## 2022-03-28 ENCOUNTER — ANTI-COAG VISIT (OUTPATIENT)
Dept: FAMILY MEDICINE CLINIC | Age: 77
End: 2022-03-28

## 2022-03-28 ENCOUNTER — NURSE ONLY (OUTPATIENT)
Dept: CARDIOLOGY CLINIC | Age: 77
End: 2022-03-28
Payer: MEDICARE

## 2022-03-28 DIAGNOSIS — Z95.810 ICD (IMPLANTABLE CARDIOVERTER-DEFIBRILLATOR), DUAL, IN SITU: ICD-10-CM

## 2022-03-28 DIAGNOSIS — I25.5 ISCHEMIC CARDIOMYOPATHY: ICD-10-CM

## 2022-03-28 DIAGNOSIS — I50.22 CHRONIC SYSTOLIC CHF (CONGESTIVE HEART FAILURE), NYHA CLASS 3 (HCC): ICD-10-CM

## 2022-03-28 DIAGNOSIS — I48.19 PERSISTENT ATRIAL FIBRILLATION (HCC): ICD-10-CM

## 2022-03-28 DIAGNOSIS — I48.19 PERSISTENT ATRIAL FIBRILLATION (HCC): Primary | ICD-10-CM

## 2022-03-28 LAB
INR BLD: 2.68 (ref 0.88–1.12)
PROTHROMBIN TIME: 31.6 SEC (ref 9.9–12.7)

## 2022-03-28 PROCEDURE — G2066 INTER DEVC REMOTE 30D: HCPCS | Performed by: NURSE PRACTITIONER

## 2022-03-28 PROCEDURE — 93297 REM INTERROG DEV EVAL ICPMS: CPT | Performed by: NURSE PRACTITIONER

## 2022-03-28 NOTE — PROGRESS NOTES
Remote transmission received from patient's dual chamber ICD home monitor. Transmission shows normal sensing and pacing function. No new arrhythmias/ events recorded. Optivol within normal range. Designated Provider will review. See interrogation under cardiology tab in the [address] [place] [address] [address] field for more details. Will continue to monitor remotely.

## 2022-04-04 DIAGNOSIS — M17.0 PRIMARY OSTEOARTHRITIS OF BOTH KNEES: ICD-10-CM

## 2022-04-04 RX ORDER — OXYCODONE HYDROCHLORIDE 5 MG/1
5 TABLET ORAL 3 TIMES DAILY PRN
Qty: 90 TABLET | Refills: 0 | Status: SHIPPED | OUTPATIENT
Start: 2022-04-04 | End: 2022-06-13 | Stop reason: SDUPTHER

## 2022-04-04 NOTE — TELEPHONE ENCOUNTER
Medication:   Requested Prescriptions     Pending Prescriptions Disp Refills    oxyCODONE (ROXICODONE) 5 MG immediate release tablet 90 tablet 0     Sig: Take 1 tablet by mouth 3 times daily as needed for Pain for up to 30 days. Last Filled:      Patient Phone Number: [phone] (home)     Last appt: 2/22/2022   Next appt: 5/25/2022    Last OARRS:   RX Monitoring 3/1/2019   Attestation The Prescription Monitoring Report for this patient was reviewed today.    Periodic Controlled Substance Monitoring -     PDMP Monitoring:    Last PDMP Daljit Pinon as Reviewed [facility]):  Review User Review Instant Review Result   Westley Bamberger 8/17/2021  8:33 AM Reviewed PDMP [1]     Preferred Pharmacy:   [facility], [address] [phone] - F [phone]  [place] [address]  Phone: [phone] Fax: [phone]    [facility] [id number] [facility]  [address] [address]  Phone: [phone] Fax: [phone]

## 2022-04-08 ENCOUNTER — ANTI-COAG VISIT (OUTPATIENT)
Dept: FAMILY MEDICINE CLINIC | Age: 77
End: 2022-04-08

## 2022-04-08 ENCOUNTER — TELEPHONE (OUTPATIENT)
Dept: FAMILY MEDICINE CLINIC | Age: 77
End: 2022-04-08

## 2022-04-08 LAB — INR BLD: 6

## 2022-04-08 NOTE — TELEPHONE ENCOUNTER
Bere Deng from [facility] [phone] is reporting a critical out of range report for this patient.   Test result as of 4-7-22 is 6.0    Please advise

## 2022-04-11 ENCOUNTER — ANTI-COAG VISIT (OUTPATIENT)
Dept: FAMILY MEDICINE CLINIC | Age: 77
End: 2022-04-11

## 2022-04-11 LAB — INR BLD: 2.5

## 2022-04-18 LAB — INR BLD: 1.8

## 2022-04-19 ENCOUNTER — ANTI-COAG VISIT (OUTPATIENT)
Dept: FAMILY MEDICINE CLINIC | Age: 77
End: 2022-04-19

## 2022-04-19 ENCOUNTER — TELEPHONE (OUTPATIENT)
Dept: FAMILY MEDICINE CLINIC | Age: 77
End: 2022-04-19

## 2022-04-20 RX ORDER — METOPROLOL SUCCINATE 50 MG/1
TABLET, EXTENDED RELEASE ORAL
Qty: 90 TABLET | Refills: 0 | Status: SHIPPED | OUTPATIENT
Start: 2022-04-20 | End: 2022-06-24

## 2022-04-20 NOTE — TELEPHONE ENCOUNTER
Received refill request for Spironolatone from [facility].     Last ov:03/02/2022 NPRG    Last labs:03/18/2022 CMP    Last Refill:10/06/2021 #180 tabs w/ 1 refills    Next appointment:05/06/2022 NPRG

## 2022-04-20 NOTE — TELEPHONE ENCOUNTER
Medication:   Requested Prescriptions     Pending Prescriptions Disp Refills    metoprolol succinate (TOPROL XL) 50 MG extended release tablet [Pharmacy Med Name: METOPROLOL SUCCINATE ER TABS 50MG] 90 tablet 3     Sig: TAKE 1 TABLET DAILY      Last Filled:     Patient Phone Number: [phone] (home)     Last appt: 2/22/2022   Next appt: 5/25/2022    Last OARRS:   RX Monitoring 3/1/2019   Attestation The Prescription Monitoring Report for this patient was reviewed today.    Periodic Controlled Substance Monitoring -     PDMP Monitoring:    Last PDMP Sherald Spurling as Reviewed [facility]):  Review User Review Instant Review Result   Cecilia Sandovaltiffanie 8/17/2021  8:33 AM Reviewed PDMP [1]     Preferred Pharmacy:   [address] [place] [address], [address] [place] [phone] - F [phone]  [facility] [address]  Phone: [phone] Fax: [phone]    [facility] [id number] [address] [address]  Phone: [phone] Fax: [phone]

## 2022-04-21 RX ORDER — SPIRONOLACTONE 25 MG/1
TABLET ORAL
Qty: 180 TABLET | Refills: 1 | Status: SHIPPED | OUTPATIENT
Start: 2022-04-21 | End: 2022-09-15

## 2022-04-28 DIAGNOSIS — I50.22 CHRONIC SYSTOLIC CHF (CONGESTIVE HEART FAILURE), NYHA CLASS 3 (HCC): ICD-10-CM

## 2022-04-28 DIAGNOSIS — Z79.4 TYPE 2 DIABETES MELLITUS WITH STAGE 3B CHRONIC KIDNEY DISEASE, WITH LONG-TERM CURRENT USE OF INSULIN (HCC): ICD-10-CM

## 2022-04-28 DIAGNOSIS — E11.22 TYPE 2 DIABETES MELLITUS WITH STAGE 3B CHRONIC KIDNEY DISEASE, WITH LONG-TERM CURRENT USE OF INSULIN (HCC): ICD-10-CM

## 2022-04-28 DIAGNOSIS — E03.9 ACQUIRED HYPOTHYROIDISM: ICD-10-CM

## 2022-04-28 DIAGNOSIS — N18.32 TYPE 2 DIABETES MELLITUS WITH STAGE 3B CHRONIC KIDNEY DISEASE, WITH LONG-TERM CURRENT USE OF INSULIN (HCC): ICD-10-CM

## 2022-04-28 LAB
PRO-BNP: 2814 PG/ML (ref 0–449)
TSH SERPL DL<=0.05 MIU/L-ACNC: 0.96 UIU/ML (ref 0.27–4.2)

## 2022-04-29 DIAGNOSIS — E11.22 TYPE 2 DIABETES MELLITUS WITH STAGE 3B CHRONIC KIDNEY DISEASE, WITH LONG-TERM CURRENT USE OF INSULIN (HCC): Primary | ICD-10-CM

## 2022-04-29 DIAGNOSIS — N18.32 TYPE 2 DIABETES MELLITUS WITH STAGE 3B CHRONIC KIDNEY DISEASE, WITH LONG-TERM CURRENT USE OF INSULIN (HCC): Primary | ICD-10-CM

## 2022-04-29 DIAGNOSIS — Z79.4 TYPE 2 DIABETES MELLITUS WITH STAGE 3B CHRONIC KIDNEY DISEASE, WITH LONG-TERM CURRENT USE OF INSULIN (HCC): Primary | ICD-10-CM

## 2022-04-29 LAB
ESTIMATED AVERAGE GLUCOSE: 145.6 MG/DL
HBA1C MFR BLD: 6.7 %

## 2022-04-29 RX ORDER — INSULIN LISPRO 100 [IU]/ML
INJECTION, SOLUTION INTRAVENOUS; SUBCUTANEOUS
Qty: 15 ML | Refills: 1 | Status: SHIPPED | OUTPATIENT
Start: 2022-04-29 | End: 2022-05-03

## 2022-04-29 NOTE — TELEPHONE ENCOUNTER
insulin lispro, 1 Unit Dial, (HUMALOG KWIKPEN) 100 UNIT/ML SOPN 15 mL 1 11/22/2021     Sig: Sliding scale-140-199-2 units, 200-249- 3 units, 250-300-4 units, >300 take 5 units      Patient is completely out.      Jose on Laax in chart

## 2022-05-02 ENCOUNTER — TELEPHONE (OUTPATIENT)
Dept: FAMILY MEDICINE CLINIC | Age: 77
End: 2022-05-02

## 2022-05-02 ENCOUNTER — ANTI-COAG VISIT (OUTPATIENT)
Dept: FAMILY MEDICINE CLINIC | Age: 77
End: 2022-05-02

## 2022-05-02 LAB
INR BLD: 3
INR BLD: 5

## 2022-05-02 NOTE — TELEPHONE ENCOUNTER
Pharmacy needs to know (1) how often is the patient doing it daily, and (2) the maximum number per day. Please advise    insulin lispro, 1 Unit Dial, (HUMALOG KWIKPEN) 100 UNIT/ML SOPN 15 mL 1 4/29/2022     Sig: Sliding scale-140-199-2 units, 200-249- 3 units, 250-300-4 units, >300 take 5 units    Sent to pharmacy as:  Insulin Lispro (1 Unit Dial) 100 UNIT/ML Subcutaneous Solution Pen-injector (HumaLOG KwikPen)    E-Prescribing Status: Receipt confirmed by pharmacy (4/29/2022 10:59 AM EDT)          Pharmacy    [facility] [id number] [facility] [place], [address] [phone] [place] [phone]   [address] [place] [address]   Phone:  [phone]  Fax:  [phone]

## 2022-05-02 NOTE — TELEPHONE ENCOUNTER
Pt's  called stating that [facility] has not been able to fill pt's insulin lispro as this was missing some information. Advise pt's  that I would call pharmacy to figure out what the problem may be. Josefina Dahl stated that they needed to know the frequency or how often pt is to inject, advise 3 times a day with meals.       Pt's  advise pharmacy will have med ready soon

## 2022-05-02 NOTE — TELEPHONE ENCOUNTER
Pt's  stated that pt held coumadin Saturday and Sunday.  Per WM patient is to check INR today and report results

## 2022-05-02 NOTE — TELEPHONE ENCOUNTER
Patient's daughter called to report INR level of 5 on 4/29/2022. Patient asking for further guidance on what to do. Appointment is scheduled for 5/6/2022. Please advise.

## 2022-05-02 NOTE — TELEPHONE ENCOUNTER
See other message in chart, pt will recheck INR today and get back to us. Pt was already holding over the weekend.

## 2022-05-03 DIAGNOSIS — Z79.4 TYPE 2 DIABETES MELLITUS WITH STAGE 3B CHRONIC KIDNEY DISEASE, WITH LONG-TERM CURRENT USE OF INSULIN (HCC): ICD-10-CM

## 2022-05-03 DIAGNOSIS — E11.22 TYPE 2 DIABETES MELLITUS WITH STAGE 3B CHRONIC KIDNEY DISEASE, WITH LONG-TERM CURRENT USE OF INSULIN (HCC): ICD-10-CM

## 2022-05-03 DIAGNOSIS — N18.32 TYPE 2 DIABETES MELLITUS WITH STAGE 3B CHRONIC KIDNEY DISEASE, WITH LONG-TERM CURRENT USE OF INSULIN (HCC): ICD-10-CM

## 2022-05-03 RX ORDER — METOLAZONE 2.5 MG/1
TABLET ORAL
Qty: 30 TABLET | Refills: 1 | Status: SHIPPED | OUTPATIENT
Start: 2022-05-03

## 2022-05-03 RX ORDER — INSULIN LISPRO 100 [IU]/ML
INJECTION, SOLUTION INTRAVENOUS; SUBCUTANEOUS
Qty: 15 ML | Refills: 2 | Status: SHIPPED | OUTPATIENT
Start: 2022-05-03 | End: 2022-05-06 | Stop reason: DRUGHIGH

## 2022-05-03 NOTE — TELEPHONE ENCOUNTER
Prescription refill    Last OV:03/02/2022    Last Refill:02/22/2022    Labs:04/28/2022    Future Appt:05/06/2022

## 2022-05-06 ENCOUNTER — HOSPITAL ENCOUNTER (OUTPATIENT)
Dept: NON INVASIVE DIAGNOSTICS | Age: 77
Discharge: HOME | End: 2022-05-06
Payer: MEDICARE

## 2022-05-06 ENCOUNTER — OFFICE VISIT (OUTPATIENT)
Dept: FAMILY MEDICINE CLINIC | Age: 77
End: 2022-05-06
Payer: MEDICARE

## 2022-05-06 ENCOUNTER — OFFICE VISIT (OUTPATIENT)
Dept: CARDIOLOGY CLINIC | Age: 77
End: 2022-05-06
Payer: MEDICARE

## 2022-05-06 VITALS
TEMPERATURE: 97 F | WEIGHT: 204.25 LBS | SYSTOLIC BLOOD PRESSURE: 114 MMHG | BODY MASS INDEX: 31.06 KG/M2 | OXYGEN SATURATION: 97 % | HEART RATE: 58 BPM | DIASTOLIC BLOOD PRESSURE: 78 MMHG | RESPIRATION RATE: 12 BRPM

## 2022-05-06 VITALS
SYSTOLIC BLOOD PRESSURE: 112 MMHG | DIASTOLIC BLOOD PRESSURE: 60 MMHG | BODY MASS INDEX: 31.22 KG/M2 | OXYGEN SATURATION: 96 % | HEART RATE: 69 BPM | WEIGHT: 206 LBS | HEIGHT: 68 IN

## 2022-05-06 DIAGNOSIS — I50.22 CHRONIC SYSTOLIC CHF (CONGESTIVE HEART FAILURE), NYHA CLASS 3 (HCC): ICD-10-CM

## 2022-05-06 DIAGNOSIS — Z79.4 TYPE 2 DIABETES MELLITUS WITH STAGE 3B CHRONIC KIDNEY DISEASE, WITH LONG-TERM CURRENT USE OF INSULIN (HCC): Primary | ICD-10-CM

## 2022-05-06 DIAGNOSIS — I48.0 PAF (PAROXYSMAL ATRIAL FIBRILLATION) (HCC): ICD-10-CM

## 2022-05-06 DIAGNOSIS — I50.22 CHRONIC SYSTOLIC CHF (CONGESTIVE HEART FAILURE), NYHA CLASS 3 (HCC): Primary | ICD-10-CM

## 2022-05-06 DIAGNOSIS — Z95.1 S/P CABG X 3: ICD-10-CM

## 2022-05-06 DIAGNOSIS — I35.0 NONRHEUMATIC AORTIC VALVE STENOSIS: ICD-10-CM

## 2022-05-06 DIAGNOSIS — E66.01 SEVERE OBESITY (BMI 35.0-39.9) WITH COMORBIDITY (HCC): ICD-10-CM

## 2022-05-06 DIAGNOSIS — K55.1 ATHEROSCLEROSIS OF SUPERIOR MESENTERIC ARTERY (HCC): ICD-10-CM

## 2022-05-06 DIAGNOSIS — E11.22 TYPE 2 DIABETES MELLITUS WITH STAGE 3B CHRONIC KIDNEY DISEASE, WITH LONG-TERM CURRENT USE OF INSULIN (HCC): Primary | ICD-10-CM

## 2022-05-06 DIAGNOSIS — I25.118 CORONARY ARTERY DISEASE OF NATIVE ARTERY OF NATIVE HEART WITH STABLE ANGINA PECTORIS (HCC): ICD-10-CM

## 2022-05-06 DIAGNOSIS — I95.9 HYPOTENSION, UNSPECIFIED HYPOTENSION TYPE: ICD-10-CM

## 2022-05-06 DIAGNOSIS — D68.69 HYPERCOAGULABLE STATE, SECONDARY (HCC): ICD-10-CM

## 2022-05-06 DIAGNOSIS — B37.0 THRUSH: ICD-10-CM

## 2022-05-06 DIAGNOSIS — N18.32 TYPE 2 DIABETES MELLITUS WITH STAGE 3B CHRONIC KIDNEY DISEASE, WITH LONG-TERM CURRENT USE OF INSULIN (HCC): Primary | ICD-10-CM

## 2022-05-06 DIAGNOSIS — N18.4 STAGE 4 CHRONIC KIDNEY DISEASE (HCC): ICD-10-CM

## 2022-05-06 PROCEDURE — 6360000004 HC RX CONTRAST MEDICATION: Performed by: INTERNAL MEDICINE

## 2022-05-06 PROCEDURE — 99214 OFFICE O/P EST MOD 30 MIN: CPT | Performed by: CLINICAL NURSE SPECIALIST

## 2022-05-06 PROCEDURE — 3044F HG A1C LEVEL LT 7.0%: CPT | Performed by: FAMILY MEDICINE

## 2022-05-06 PROCEDURE — 99214 OFFICE O/P EST MOD 30 MIN: CPT | Performed by: FAMILY MEDICINE

## 2022-05-06 PROCEDURE — C8924 2D TTE W OR W/O FOL W/CON,FU: HCPCS

## 2022-05-06 PROCEDURE — 93308 TTE F-UP OR LMTD: CPT

## 2022-05-06 RX ORDER — CALCITRIOL 0.25 UG/1
0.25 CAPSULE, LIQUID FILLED ORAL DAILY
COMMUNITY
Start: 2022-05-04

## 2022-05-06 RX ORDER — FLUCONAZOLE 100 MG/1
100 TABLET ORAL DAILY
Qty: 7 TABLET | Refills: 0 | Status: SHIPPED | OUTPATIENT
Start: 2022-05-06 | End: 2022-05-13

## 2022-05-06 RX ORDER — INSULIN LISPRO 100 [IU]/ML
INJECTION, SOLUTION INTRAVENOUS; SUBCUTANEOUS
Qty: 15 ML | Refills: 2 | Status: SHIPPED | OUTPATIENT
Start: 2022-05-06 | End: 2022-08-15

## 2022-05-06 RX ORDER — PREDNISONE 10 MG/1
TABLET ORAL
Qty: 100 TABLET | Refills: 0 | Status: ON HOLD | OUTPATIENT
Start: 2022-05-06 | End: 2022-05-24 | Stop reason: HOSPADM

## 2022-05-06 RX ADMIN — PERFLUTREN 1.65 MG: 6.52 INJECTION, SUSPENSION INTRAVENOUS at 12:07

## 2022-05-06 NOTE — PATIENT INSTRUCTIONS
1.  Continue current medications  2. I will call with echo results  3. August blood work   4.   RTO in in August

## 2022-05-06 NOTE — PROGRESS NOTES
[facility]  Progress Note    Primary Care Doctor:  Flynn Marsh MD    Chief Complaint   Patient presents with    Congestive Heart Failure        History of Present Illness:  77 y.o. female with history of CAD/CABG in 2018, PAF with maze , HTN, HLD, DVT/PE on coumadin, 2 CVs unsuccessful  -3/10/21 for small bowel pneumatosis with loculated pneumoperitoneum (IV zoysn and TPN), acute on chronic sHF (torsemide decreased at discharge, aldactone was held and coreg dose decreased due to hypotension), TORIBIO on CKD, PAF  ICD placed 2021  covid (antibodies, steroids and antibiotics). She was on prednisone  to  (off for 2 days). I had the pleasure of seeing Orion Brown in follow up for sHF. She is in a wheel chair with Paulo Le (). She had an echo done today which shows preliminary LVEF of 40% which is improved from 10-15%. She feels good, no edema, increased shortness of breath, palpitations or lightheadedness. Her optival today shows normal thoracic impedence (she was on dry side march to middle of April). She continues on metolazone twice a week. Labs reviewed and all stable. Past Medical History:   has a past medical history of Asthma, Atrial fibrillation ([address] Eosinophilia, Hemoptysis, HIGH CHOLESTEROL, Hx of blood clots, Hypertension, Irregular heart beat, Other specified gastritis without mention of hemorrhage, Palpitations, Skin cancer, and Type II or unspecified type diabetes mellitus without mention of complication, not stated as uncontrolled. Surgical History:   has a past surgical history that includes Cholecystectomy;  section; Colonoscopy (2017); skin biopsy; bronchoscopy (2016); Coronary artery bypass graft (2018); Mitral valve replacement (2018); transesophageal echocardiogram (2018); Tunneled venous catheter placement (Left, 2018); Cardiac catheterization (2018);  Insertable Cardiac Monitor (Left, 08/16/2018); Colonoscopy (01/17/2014); Upper gastrointestinal endoscopy (N/A, 10/10/2018); Colonoscopy (10/10/2018); Colonoscopy (N/A, 8/7/2020); Pain management procedure (N/A, 12/18/2020); Pain management procedure (Bilateral, 1/18/2021); and Cardiac catheterization (5/2 and 5/3 2021). Social History:   reports that she has never smoked. She has never used smokeless tobacco. She reports that she does not drink alcohol and does not use drugs. Family History:   Family History   Problem Relation Age of Onset    Cancer Father     Asthma Mother     Hypertension Mother     Heart Disease Mother     High Blood Pressure Mother        Home Medications:  Prior to Admission medications    Medication Sig Start Date End Date Taking?  Authorizing Provider   calcitRIOL (ROCALTROL) 0.25 MCG capsule Take 0.25 mcg by mouth daily  5/4/22  Yes Historical Provider, MD   insulin lispro, 1 Unit Dial, (HUMALOG KWIKPEN) 100 UNIT/ML SOPN 5 units plusTID Insulin dosage per Sliding scale-140-199-2 units, 200-249- 3 units, 250-300-4 units, >300 take 5 units, max dose per day of 50 units 5/6/22  Yes Keena Flores MD   predniSONE (DELTASONE) 10 MG tablet TAKE 1 TABLET AS NEEDED (EOSINOPHILIC GASATRITIS)  Patient taking differently: as needed TAKE 1 TABLET AS NEEDED (EOSINOPHILIC GASATRITIS) [birth year]  Yes Keena Flores MD   metOLazone (ZAROXOLYN) 2.5 MG tablet TAKE 1 TABLET ON TUESDAY AND FRIDAY AND AS DIRECTED 5/3/22  Yes SOLEDAD Kelsey - CNS   spironolactone (ALDACTONE) 25 MG tablet TAKE 1 TABLET TWICE A DAY 4/21/22  Yes SOLEDAD Kelsey - CNS   metoprolol succinate (TOPROL XL) 50 MG extended release tablet TAKE 1 TABLET DAILY 4/20/22  Yes Keena Flores MD   vitamin D (ERGOCALCIFEROL) 1.25 MG (04671 UT) CAPS capsule TAKE ONE CAPSULE BY MOUTH ONCE WEEKLY 3/24/22  Yes SOLEDAD Mays - CNP   amiodarone (CORDARONE) 200 MG tablet TAKE 1 TABLET DAILY  Patient taking differently: Take 100 mg by mouth daily 3/10/22  Yes Maggi Lua MD   torsemide (DEMADEX) 20 MG tablet TAKE 2 TABLETS TWICE A DAY 3/7/22  Yes Spencer De Santiago MD   levothyroxine (SYNTHROID) 100 MCG tablet Take 1 tablet by mouth daily 2/22/22  Yes Spencer De Santiago MD   insulin glargine (LANTUS SOLOSTAR) 100 UNIT/ML injection pen INJECT 20 UNITS IN THE MORNING  Patient taking differently: 22 Units INJECT 20 UNITS IN THE MORNING 11/22/21  Yes Spencer De Santiago MD   albuterol sulfate  (90 Base) MCG/ACT inhaler USE 2 INHALATIONS EVERY 6 HOURS AS NEEDED FOR WHEEZING 11/19/21  Yes Spencer De Santiago MD   ipratropium-albuterol (DUONEB) 0.5-2.5 (3) MG/3ML SOLN nebulizer solution Inhale 3 mLs into the lungs every 4 hours as needed for Shortness of Breath 11/15/21  Yes Spencer De Santiago MD   midodrine (PROAMATINE) 2.5 MG tablet TAKE ONE TABLET BY MOUTH TWICE A DAY  Patient taking differently: as needed  9/16/21  Yes SOLEDAD Bah - CNS   oxyCODONE (ROXICODONE) 5 MG immediate release tablet Take 0.5 mg by mouth daily. Yes Historical Provider, MD   montelukast (SINGULAIR) 10 MG tablet TAKE 1 TABLET NIGHTLY 8/3/21  Yes Spencer De Santiago MD   potassium chloride (KLOR-CON M) 10 MEQ extended release tablet TAKE 1 TABLET DAILY 8/3/21  Yes Spencer De Santiago MD   warfarin (COUMADIN) 5 MG tablet TAKE 1 TABLET DAILY  Patient taking differently: Review INR prior to administration.   Managed by Dr. Karla Oneill 8/3/21  Yes Spencer De Santiago MD   magnesium oxide (MAG-OX) 400 MG tablet Take 1 tablet by mouth daily 5/10/21  Yes Shaneka Dominguez MD   ACETAMINOPHEN PO Take 500 mg by mouth every 6 hours as needed    Yes Historical Provider, MD   albuterol (PROVENTIL) (2.5 MG/3ML) 0.083% nebulizer solution Take 3 mLs by nebulization every 6 hours as needed for Wheezing 6/2/20  Yes Spencer De Santiago MD   omeprazole (PRILOSEC) 20 MG delayed release capsule Take 20 mg by mouth daily   Yes Historical Provider, MD   ondansetron (ZOFRAN) 4 MG tablet Take 1 tablet by mouth 3 times daily as needed for Nausea or Vomiting 3/26/20  Yes Steph Arguelles MD   aspirin 81 MG chewable tablet Take 1 tablet by mouth daily 6/7/19  Yes Steph Arguelles MD   vitamin B-12 500 MCG tablet Take 1 tablet by mouth daily 10/12/18  Yes Carlos Pope MD   fluconazole (DIFLUCAN) 100 MG tablet Take 1 tablet by mouth daily for 7 days 5/6/22 5/13/22  tSeph Arguelles MD   nystatin (MYCOSTATIN) 815824 UNIT/ML suspension TAKE ONE TEASPOONFUL BY MOUTH FOUR TIMES A DAY FOR 5 DAYS 2/22/22   Steph Arguelles MD   blood glucose test strips (FREESTYLE LITE) strip USE FOUR TIMES A DAY 2/15/22   Steph Arguelles MD   FreeStyle Lancets MISC 1 each by Does not apply route 4 times daily 9/7/21   Steph Arguelles MD   Blood Glucose Monitoring Suppl (FREESTYLE LITE) GAETANO 1 Device by Does not apply route 4 times daily    Historical Provider, MD   Insulin Pen Needle (BD PEN NEEDLE JANNET U/F) 32G X 4 MM MISC USE 1 PEN NEEDLE FOUR TIMES A DAY 6/14/21   Steph Arguelles MD   polyethylene glycol (GLYCOLAX) 17 GM/SCOOP powder Take 17 g by mouth daily     Historical Provider, MD        Allergies:  [id number] [fluocinolone], Ciprofloxacin, Diovan [valsartan], Flagyl [metronidazole], Metformin and related [metformin and related], Benazepril, Morphine, Saxagliptin, and Levaquin [levofloxacin]     Review of Systems:   · Constitutional: there has been no unanticipated weight loss. There's been no change in energy level, sleep pattern, or activity level. · Eyes: No visual changes or diplopia. No scleral icterus. · ENT: No Headaches, hearing loss or vertigo. No mouth sores or sore throat. · Cardiovascular: Reviewed in HPI  · Respiratory: No cough or wheezing, no sputum production. No hematemesis. · Gastrointestinal: No abdominal pain, appetite loss, blood in stools. No change in bowel or bladder habits. · Genitourinary: No dysuria, trouble voiding, or hematuria.   · Musculoskeletal:  No gait disturbance, weakness or joint complaints. · Integumentary: No rash or pruritis. · Neurological: No headache, diplopia, change in muscle strength, numbness or tingling. No change in gait, balance, coordination, mood, affect, memory, mentation, behavior. · Psychiatric: No anxiety, no depression. · Endocrine: No malaise, fatigue or temperature intolerance. No excessive thirst, fluid intake, or urination. No tremor. · Hematologic/Lymphatic: No abnormal bruising or bleeding, blood clots or swollen lymph nodes. · Allergic/Immunologic: No nasal congestion or hives. Physical Examination:    Vitals:    05/06/22 1233 05/06/22 1304   BP: (!) 88/44 112/60   Pulse: 69    SpO2: 96%    Weight: 206 lb (93.4 kg)    Height: 5' 8\" (1.727 m)         Constitutional and General Appearance: Warm and dry, no apparent distress, normal coloration  HEENT:  Normocephalic, atraumatic  Respiratory:  · Normal excursion and expansion without use of accessory muscles  · Resp Auscultation: Normal breath sounds without dullness  Cardiovascular:  · The apical impulses not displaced  · Heart tones are crisp and normal  · JVP normal cm H2O  · regular rate and rhythm  · Peripheral pulses are symmetrical and full  · There is no clubbing, cyanosis of the extremities.   · No ankle edema  · Pedal Pulses: 2+ and equal   Abdomen:   · No masses or tenderness  · Liver/Spleen: No Abnormalities Noted  Neurological/Psychiatric:  · Alert and oriented in all spheres  · Moves all extremities well  · Exhibits normal gait balance and coordination  · No abnormalities of mood, affect, memory, mentation, or behavior are noted    Lab Data:    CBC:   Lab Results   Component Value Date    WBC 5.7 04/28/2022    WBC 4.7 03/02/2022    WBC 11.2 12/09/2021    RBC 4.84 04/28/2022    RBC 3.97 03/02/2022    RBC 4.20 12/09/2021    HGB 13.6 04/28/2022    HGB 11.7 03/02/2022    HGB 12.5 12/09/2021    HCT 42.1 04/28/2022    HCT 35.9 03/02/2022    HCT 37.9 12/09/2021    MCV 87.0 04/28/2022    MCV 90.3 03/02/2022    MCV 90.3 12/09/2021    RDW 17.1 04/28/2022    RDW 16.2 03/02/2022    RDW 15.0 12/09/2021     04/28/2022     03/02/2022     12/09/2021     BMP:  Lab Results   Component Value Date     04/28/2022     03/18/2022     03/02/2022    K 3.5 04/28/2022    K 3.5 03/18/2022    K 4.2 03/02/2022    K 3.5 04/21/2021    K 3.7 04/20/2021    K 4.0 04/19/2021    CL 94 04/28/2022    CL 95 03/18/2022     03/02/2022    CO2 28 04/28/2022    CO2 27 03/18/2022    CO2 28 03/02/2022    PHOS 4.0 04/28/2022    PHOS 3.8 03/18/2022    PHOS 4.0 05/05/2021    BUN 42 04/28/2022    BUN 52 03/18/2022    BUN 34 03/02/2022    CREATININE 1.7 04/28/2022    CREATININE 2.1 03/18/2022    CREATININE 1.8 03/02/2022     BNP:   Lab Results   Component Value Date    PROBNP 2,814 04/28/2022    PROBNP 1,698 03/02/2022    PROBNP 1,307 02/21/2022     Cardiac Imaging:  Echo 5/6/2022  Summary   Technically difficult examination due to visualization and irregular rhythm   Normal left ventricle size. There is moderate concentric left ventricular hypertrophy. Ejection fraction is visually estimated to be 40%. Overall left ventricular systolic function appears moderately reduced. There is akinesis of the apex walls. 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   No evidence of apical thrombus by contrast administration. A bioprosthetic mitral valve is well seated. Visually opens well. Cannot   adequately assess for dysfunction as gradients not obtained. The left atrium is moderately dilated. No pericardial effusion.      JUDE Preliminary 5/4/2021 Dr Maritza Cash  AV - area ~ 1.5, opens adequately, not severe aortic stenosis     LHC 5/3/2021 Dr Maritza [place]  Artery Findings/Result   LM Normal   LAD 50% mid, 70% mid, competitive flow distal from LIMA   Cx OM1 20% ostial to prox, superior branch mid OM1 50% ostial   RI N/A   S-D-RPL patent   L-LAD patent   RCA 50-60% distal, very heavily calcified   LVEDP 15   AV Peak to peak gradient 36mmHg, valve crossed easily with pigtail. LVG NA      Intervention:         None     Post Cath Dx:       Patent grafts     Echo 4/20/21  Summary   Overall left ventricular systolic function is severely depressed .   Ejection fraction is visually estimated to be 10-15 %. E/e'= 23.2 GLS=-3.4   Moderately dilated left ventricle.   There is severe diffuse hypokinesis.   Indeterminate diastolic function.   A bioprosthetic mitral valve is well seated with peak velocity of 1.59m/s   and a mean gradient of 3mmHg.   The left atrium is moderately dilated.   Mild aortic stenosis with a peak velocity of 2.5m/s and a mean pressure   gradient of 14mmHg.   The aortic valve is thickened/calcified with decreased leaflet mobility   consistent with aortic stenosis.   Mild to moderate tricuspid regurgitation.   Estimated pulmonary artery systolic pressure is mildly to moderately   elevated at 46 mmHg assuming a right atrial pressure of 8 mmHg    11/30/2020 Dobutamine Echo   Dobutamine echocardiogram for aortic stenosis.   Very technically limited exam due to atrial fibrillation.   Baseline echocardiogram shows severe left ventricular dysfunction with   anterior, lateral and apical hypokinesis with an ejection fraction of 20-25%.   There is no improvement in wall motion with low or high dose dobutamine   suggestive of nonviable myocardium.      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 After dobutamine stress the mean AV   gradient rises to 20mmHg with 20mcg of dobutamine consistent with mild to   moderate aortic stenosis. Prosthetic mitral valve with a mean gradient of 7mmHg        JUDE 10/21/2020  Mildly dilated left ventricular size and normal wall thickness. Global left ventricular function is severely decreased with ejection fraction estimated at 25%.  Patient is in atrial fibrillation during procedure.      The bioprosthetic mitral valve is well seated with a mean gradient of 5mmHg and maximum pressure gradient of 15 mmHg with heart rate of 89 bpm. Pressure half time of 82 ms. There is trivial mitral regurgitation.      Left atrial enlargement. Spontaneous echo contrast seen in the left atrium. A large stump of the left atrial appendage with no thrombus noted. Patient has history of surgical ligation of the left atrial appendage. There are spontaneous echo contrast in the atrial appendage.      The aortic valve is thickened/calcified with decreased leaflet mobility consistent with aortic stenosis. There is trivial aortic insufficiency.      There is moderate tricuspid regurgitation.     ECHO 9/15/20  Left ventricular cavity size is dilated. There is normal wall thickness with a moderately severe reduction in   systolic function.   LV ejection fraction is visually estimated to be 25-30%.   Indeterminate diastolic function.   The right ventricle is not well visualized but appears to be normal in size with moderately reduced function.   The left atrium is moderately dilated.   A bioprosthetic mitral valve with a mean gradient of 7 mmHg. This may be a normal gradient for this valve but could suggest mild stenosis.   Trivial mitral regurgitation.   The aortic valve is thickened/calcified with a mean gradient of 16mmHg consistent with at least mild aortic stenosis. This is likely underestimated due to low cardiac output secondary to LV dysfunction.   No significant aortic valve regurgitation.   Moderate tricuspid regurgitation with RVSP of 48 mmHg.     JUDE 11/1/2019  Normal left ventricular cavity size and wall thickness. Global left ventricular function is moderate-to-severely decreased with ejection fraction estimated from 35% to 40%. Severe apical akinesis noted.      The bioprosthetic mitral valve is well seated with a mean gradient of 2mmHg and maximum pressure gradient of 5 mmHg. There is trivial mitral regurgitation.      Left atrial enlargement. Spontaneous echo contrast seen in the left atrium.   Stump of the left atrial appendage noted with no thrombus.      The aortic valve is thickened/calcified with decreased leaflet mobility consistent with aortic stenosis. There is trivial aortic insufficiency    Assessment:    1. Chronic systolic heart failure due to valvular disease ([facility]) on arb, BB and aldosterone antagonist   2. PAF (paroxysmal atrial fibrillation) (HCC) on coumadin in nsr   3. S/P CABG x 3    4. Nonrheumatic aortic valve stenosis    5. Hypotension on midodrine   6. Obesity BMI 35-39.9      Plan:   Patient Instructions   1. Continue current medications  2. I will call with echo results  3. August blood work   4.   RTO in in August      I appreciate the opportunity of cooperating in the care of this individual.    SOLEDAD Deng - CNS, CNS, 5/6/2022, 2:53 PM

## 2022-05-06 NOTE — PROGRESS NOTES
Subjective:      Patient ID: Erma Porter is a 77 y.o. female. CC: Patient presents for re-evaluation of chronic health problems including diabetes mellitus with chronic kidney disease, hypercoagulable status secondary to Coumadin, coronary artery disease and atherosclerosis and thrush. HPI Pt is here fr a follow up and will like to discuss sugars and thrush. Since last appointment time patient has been under the care of nephrology and kidney function has stabilized with a creatinine of 2.0. Thought that the kidney function strain was secondary to underlying heart failure treatment. She does find her weight goes up and sooner she takes a metolazone medication the weight does come back down. She is doing metolazone medication twice a week. Blood sugars in the last month have improved with fasting blood sugars typically between 100-140 and she does have some variability through the rest of the day. She typically takes 5 units plus a sliding scale. She occasionally does have a low blood sugar reading. She also has developed thrush in her mouth. Patient denies any flareup of eosinophilic gastritis and denies any change of her abdominal symptoms at this time.     Review of Systems  Patient Active Problem List   Diagnosis    Long term current use of anticoagulant    Hypercholesteremia    Generalized osteoarthrosis, involving multiple sites    Eosinophilic gastritis    Paroxysmal SVT (supraventricular tachycardia) (HCC)    B12 deficiency    History of pulmonary embolism    Multiple pulmonary nodules    Asthma    PAF (paroxysmal atrial fibrillation) (HCC)    Hypertension    Coronary artery disease due to lipid rich plaque    Nonrheumatic mitral valve regurgitation    Goiter    Primary osteoarthritis of both knees    Splenic infarct    Obesity, Class I, BMI 30-34.9    Chronic systolic CHF (congestive heart failure), NYHA class 3 ([address]    Thoracic degenerative disc disease    Persistent atrial fibrillation (HCC)    S/P MVR (mitral valve repair)    Ischemic cardiomyopathy    Occlusion and stenosis of bilateral carotid arteries    Pneumatosis intestinalis of small intestine    Aortic valve stenosis    Deep vein thrombosis (DVT) of non-extremity vein    Acute on chronic systolic heart failure due to valvular disease ([facility])    Stage 4 chronic kidney disease (HCC)    Hypotension    Acute on chronic systolic CHF (congestive heart failure) (HCC)    Chronic diastolic heart failure (HCC)    Atherosclerosis of superior mesenteric artery ([address]    ICD (implantable cardioverter-defibrillator), dual, in situ    Type 2 diabetes mellitus with stage 3b chronic kidney disease, with long-term current use of insulin ([facility])    Acquired hypothyroidism       Outpatient Medications Marked as Taking for the 5/6/22 encounter (Office Visit) with Naldo Rajput MD   Medication Sig Dispense Refill    calcitRIOL (ROCALTROL) 0.25 MCG capsule Take 0.25 mcg by mouth      insulin lispro, 1 Unit Dial, (HUMALOG KWIKPEN) 100 UNIT/ML SOPN TID Insulin dosage per Sliding scale-140-199-2 units, 200-249- 3 units, 250-300-4 units, >300 take 5 units, max dose per day of 50 units 15 mL 2    metOLazone (ZAROXOLYN) 2.5 MG tablet TAKE 1 TABLET ON TUESDAY AND FRIDAY AND AS DIRECTED 30 tablet 1    spironolactone (ALDACTONE) 25 MG tablet TAKE 1 TABLET TWICE A  tablet 1    metoprolol succinate (TOPROL XL) 50 MG extended release tablet TAKE 1 TABLET DAILY 90 tablet 0    vitamin D (ERGOCALCIFEROL) 1.25 MG (64456 UT) CAPS capsule TAKE ONE CAPSULE BY MOUTH ONCE WEEKLY 12 capsule 1    amiodarone (CORDARONE) 200 MG tablet TAKE 1 TABLET DAILY 60 tablet 5    torsemide (DEMADEX) 20 MG tablet TAKE 2 TABLETS TWICE A  tablet 1    nystatin (MYCOSTATIN) 694697 UNIT/ML suspension TAKE ONE TEASPOONFUL BY MOUTH FOUR TIMES A DAY FOR 5 DAYS 120 mL 1    levothyroxine (SYNTHROID) 100 MCG tablet Take 1 tablet by mouth daily 90 tablet 1    blood glucose test strips (FREESTYLE LITE) strip USE FOUR TIMES A  strip 3    insulin glargine (LANTUS SOLOSTAR) 100 UNIT/ML injection pen INJECT 20 UNITS IN THE MORNING (Patient taking differently: 22 Units INJECT 20 UNITS IN THE MORNING) 90 mL 1    albuterol sulfate  (90 Base) MCG/ACT inhaler USE 2 INHALATIONS EVERY 6 HOURS AS NEEDED FOR WHEEZING 25.5 g 2    ipratropium-albuterol (DUONEB) 0.5-2.5 (3) MG/3ML SOLN nebulizer solution Inhale 3 mLs into the lungs every 4 hours as needed for Shortness of Breath 120 each 0    midodrine (PROAMATINE) 2.5 MG tablet TAKE ONE TABLET BY MOUTH TWICE A DAY (Patient taking differently: as needed ) 60 tablet 5    FreeStyle Lancets MISC 1 each by Does not apply route 4 times daily 200 each 5    Blood Glucose Monitoring Suppl (FREESTYLE LITE) GAETANO 1 Device by Does not apply route 4 times daily      oxyCODONE (ROXICODONE) 5 MG immediate release tablet Take 0.5 mg by mouth daily.       montelukast (SINGULAIR) 10 MG tablet TAKE 1 TABLET NIGHTLY 90 tablet 3    potassium chloride (KLOR-CON M) 10 MEQ extended release tablet TAKE 1 TABLET DAILY 90 tablet 3    warfarin (COUMADIN) 5 MG tablet TAKE 1 TABLET DAILY 100 tablet 3    Insulin Pen Needle (BD PEN NEEDLE JANNET U/F) 32G X 4 MM MISC USE 1 PEN NEEDLE FOUR TIMES A  each 3    magnesium oxide (MAG-OX) 400 MG tablet Take 1 tablet by mouth daily 90 tablet 3    ACETAMINOPHEN PO Take 500 mg by mouth every 6 hours as needed       polyethylene glycol (GLYCOLAX) 17 GM/SCOOP powder Take 17 g by mouth daily       omeprazole (PRILOSEC) 20 MG delayed release capsule Take 20 mg by mouth daily      ondansetron (ZOFRAN) 4 MG tablet Take 1 tablet by mouth 3 times daily as needed for Nausea or Vomiting 30 tablet 0    aspirin 81 MG chewable tablet Take 1 tablet by mouth daily 30 tablet 3    vitamin B-12 500 MCG tablet Take 1 tablet by mouth daily 30 tablet 3       Allergies   Allergen Reactions    Rutptrsj-Rvivwck-Dxkvfr [Fluocinolone] Shortness Of Breath    Ciprofloxacin Shortness Of Breath    Diovan [Valsartan] Shortness Of Breath    Flagyl [Metronidazole] Shortness Of Breath     Has taken diflucan at home 12/7/15    Metformin And Related [Metformin And Related] Shortness Of Breath    Benazepril      Other reaction(s): Not Recorded    Morphine      Bad reaction. \"makes her feel horrible\".  Saxagliptin      Other reaction(s): Not Recorded    Levaquin [Levofloxacin] Rash       Social History     Tobacco Use    Smoking status: Never Smoker    Smokeless tobacco: Never Used   Substance Use Topics    Alcohol use: No       /78 (Site: Left Upper Arm, Position: Sitting, Cuff Size: Large Adult)   Pulse 58   Temp 97 °F (36.1 °C) (Infrared)   Resp 12   Wt 204 lb 4 oz (92.6 kg)   SpO2 97%   BMI 31.06 kg/m²     Objective:   Physical Exam  Constitutional:       General: She is not in acute distress. Appearance: She is well-developed. HENT:      Mouth/Throat:      Mouth: Oral lesions (thrush) present. Neck:      Vascular: No carotid bruit. Cardiovascular:      Rate and Rhythm: Normal rate and regular rhythm. Pulses:           Dorsalis pedis pulses are 2+ on the right side and 2+ on the left side. Posterior tibial pulses are 2+ on the right side and 2+ on the left side. Heart sounds: Murmur (Right sternal border) heard. Systolic murmur is present with a grade of 2/6. No friction rub. No gallop. Pulmonary:      Effort: Pulmonary effort is normal.      Breath sounds: Normal breath sounds. Musculoskeletal:         General: No tenderness. Normal range of motion. Right lower leg: No edema. Left lower leg: No edema. Right foot: Normal.      Left foot: Normal.   Skin:     Findings: No rash. Neurological:      Mental Status: She is alert and oriented to person, place, and time. Sensory: Sensation is intact. Motor: Motor function is intact. Deep Tendon Reflexes: Reflexes are normal and symmetric. Comments: 12 point monofilament test normal    Psychiatric:         Behavior: Behavior is cooperative. Assessment:      Maggie Larson was seen today for follow-up, diabetes and hypertension. Diagnoses and all orders for this visit:    Type 2 diabetes mellitus with stage 3b chronic kidney disease, with long-term current use of insulin ([facility])  -     insulin lispro, 1 Unit Dial, (HUMALOG KWIKPEN) 100 UNIT/ML SOPN; 5 units plusTID Insulin dosage per Sliding scale-140-199-2 units, 200-249- 3 units, 250-300-4 units, >300 take 5 units, max dose per day of 50 units  -     Hemoglobin A1C; Future  -     Basic Metabolic Panel; Future    Hypercoagulable state, secondary ([address]    Stage 4 chronic kidney disease ([address]    Atherosclerosis of superior mesenteric artery (HCC)    Coronary artery disease of native artery of native heart with stable angina pectoris ([address]    Aminata Kimbrough    Other orders  -     fluconazole (DIFLUCAN) 100 MG tablet; Take 1 tablet by mouth daily for 7 days  -     predniSONE (DELTASONE) 10 MG tablet; TAKE 1 TABLET AS NEEDED (EOSINOPHILIC GASATRITIS)            Plan:      Reviewed labs and test results with patient. Diabetes overall is extremely well controlled and recommend she continue with her current treatment plan. We did discuss that if her kidney function would worsen she would need to take less insulin. Continue following a low-salt diet and continue follow-up with nephrology on a regular basis along with cardiology. Begin treatment for the thrush    Maintain prednisone on a as needed basis for eosinophilic gastritis    Hypercoagulable status has improved with lowering dose of Coumadin and she should continue having INR done on a regular basis.     Patient received counseling on the following healthy behaviors: nutrition and exercise     Patient given educational materials     Health maintenance updated    Discussed use, benefit, and side effects of prescribed medications. Barriers to medication compliance addressed. All patient questions answered. Pt voiced understanding. Patient needs RTC in 3 months. Medical decision making of moderate complexity. Please note that this chart was generated using Dragon dictation software. Although every effort was made to ensure the accuracy of this automated transcription, some errors in transcription may have occurred.

## 2022-05-09 ENCOUNTER — TELEPHONE (OUTPATIENT)
Dept: CARDIOLOGY CLINIC | Age: 77
End: 2022-05-09

## 2022-05-09 ENCOUNTER — NURSE ONLY (OUTPATIENT)
Dept: CARDIOLOGY CLINIC | Age: 77
End: 2022-05-09
Payer: MEDICARE

## 2022-05-09 DIAGNOSIS — I50.22 CHRONIC SYSTOLIC CHF (CONGESTIVE HEART FAILURE), NYHA CLASS 3 (HCC): ICD-10-CM

## 2022-05-09 DIAGNOSIS — Z95.810 ICD (IMPLANTABLE CARDIOVERTER-DEFIBRILLATOR), DUAL, IN SITU: ICD-10-CM

## 2022-05-09 DIAGNOSIS — I47.1 PAROXYSMAL SVT (SUPRAVENTRICULAR TACHYCARDIA) (HCC): ICD-10-CM

## 2022-05-09 DIAGNOSIS — I25.5 ISCHEMIC CARDIOMYOPATHY: ICD-10-CM

## 2022-05-09 DIAGNOSIS — I38 ACUTE ON CHRONIC SYSTOLIC HEART FAILURE DUE TO VALVULAR DISEASE (HCC): ICD-10-CM

## 2022-05-09 DIAGNOSIS — I48.0 PAF (PAROXYSMAL ATRIAL FIBRILLATION) (HCC): ICD-10-CM

## 2022-05-09 DIAGNOSIS — I50.23 ACUTE ON CHRONIC SYSTOLIC HEART FAILURE DUE TO VALVULAR DISEASE (HCC): ICD-10-CM

## 2022-05-09 NOTE — TELEPHONE ENCOUNTER
----- Message from [facility] sent at 5/9/2022  9:06 AM EDT -----  Please call and make sure they received [employer] message  Her echo squeeze is 40%!!!

## 2022-05-09 NOTE — TELEPHONE ENCOUNTER
I spoke with pt and she stated that she had not viewed her My Chart and I relayed the good news to her. She verbalized understanding.

## 2022-05-10 ENCOUNTER — OFFICE VISIT (OUTPATIENT)
Dept: FAMILY MEDICINE CLINIC | Age: 77
End: 2022-05-10
Payer: MEDICARE

## 2022-05-10 VITALS — TEMPERATURE: 97.9 F | OXYGEN SATURATION: 96 % | HEART RATE: 85 BPM

## 2022-05-10 DIAGNOSIS — J40 BRONCHITIS: Primary | ICD-10-CM

## 2022-05-10 PROCEDURE — 93296 REM INTERROG EVL PM/IDS: CPT | Performed by: INTERNAL MEDICINE

## 2022-05-10 PROCEDURE — 93297 REM INTERROG DEV EVAL ICPMS: CPT | Performed by: INTERNAL MEDICINE

## 2022-05-10 PROCEDURE — 99213 OFFICE O/P EST LOW 20 MIN: CPT | Performed by: NURSE PRACTITIONER

## 2022-05-10 PROCEDURE — 93295 DEV INTERROG REMOTE 1/2/MLT: CPT | Performed by: INTERNAL MEDICINE

## 2022-05-10 RX ORDER — CEPHALEXIN 500 MG/1
500 CAPSULE ORAL 2 TIMES DAILY
Qty: 20 CAPSULE | Refills: 0 | Status: SHIPPED | OUTPATIENT
Start: 2022-05-10 | End: 2022-05-13 | Stop reason: ALTCHOICE

## 2022-05-10 NOTE — PROGRESS NOTES
Roger Carranza  : 1945  Encounter date: 5/10/2022    This raghu 77 y.o. female who presents with  Chief Complaint   Patient presents with    Cough       History of present illness:    HPI Pt is 77year old female with productive cough, dyspnea. Pt denies fluid overload from CHF. Pt reports rhinorrhea. Pt with underlying respiratory conditions, has used proair inhaler. Fully vaccinated. Pt is diabetic, not tolerate steroids well, has prednisone 10 mg for gastritis, advised to take once daily x 5 days.     Current Outpatient Medications on File Prior to Visit   Medication Sig Dispense Refill    calcitRIOL (ROCALTROL) 0.25 MCG capsule Take 0.25 mcg by mouth daily       insulin lispro, 1 Unit Dial, (HUMALOG KWIKPEN) 100 UNIT/ML SOPN 5 units plusTID Insulin dosage per Sliding scale-140-199-2 units, 200-249- 3 units, 250-300-4 units, >300 take 5 units, max dose per day of 50 units 15 mL 2    fluconazole (DIFLUCAN) 100 MG tablet Take 1 tablet by mouth daily for 7 days 7 tablet 0    predniSONE (DELTASONE) 10 MG tablet TAKE 1 TABLET AS NEEDED (EOSINOPHILIC GASATRITIS) (Patient taking differently: as needed TAKE 1 TABLET AS NEEDED (EOSINOPHILIC GASATRITIS)) 133 tablet 0    metOLazone (ZAROXOLYN) 2.5 MG tablet TAKE 1 TABLET ON TUESDAY AND FRIDAY AND AS DIRECTED 30 tablet 1    spironolactone (ALDACTONE) 25 MG tablet TAKE 1 TABLET TWICE A  tablet 1    metoprolol succinate (TOPROL XL) 50 MG extended release tablet TAKE 1 TABLET DAILY 90 tablet 0    vitamin D (ERGOCALCIFEROL) 1.25 MG (06529 UT) CAPS capsule TAKE ONE CAPSULE BY MOUTH ONCE WEEKLY 12 capsule 1    amiodarone (CORDARONE) 200 MG tablet TAKE 1 TABLET DAILY (Patient taking differently: Take 100 mg by mouth daily ) 60 tablet 5    torsemide (DEMADEX) 20 MG tablet TAKE 2 TABLETS TWICE A  tablet 1    nystatin (MYCOSTATIN) 454975 UNIT/ML suspension TAKE ONE TEASPOONFUL BY MOUTH FOUR TIMES A DAY FOR 5 DAYS 120 mL 1    levothyroxine (SYNTHROID) 100 MCG tablet Take 1 tablet by mouth daily 90 tablet 1    blood glucose test strips (FREESTYLE LITE) strip USE FOUR TIMES A  strip 3    insulin glargine (LANTUS SOLOSTAR) 100 UNIT/ML injection pen INJECT 20 UNITS IN THE MORNING (Patient taking differently: 22 Units INJECT 20 UNITS IN THE MORNING) 90 mL 1    albuterol sulfate  (90 Base) MCG/ACT inhaler USE 2 INHALATIONS EVERY 6 HOURS AS NEEDED FOR WHEEZING 25.5 g 2    ipratropium-albuterol (DUONEB) 0.5-2.5 (3) MG/3ML SOLN nebulizer solution Inhale 3 mLs into the lungs every 4 hours as needed for Shortness of Breath 120 each 0    midodrine (PROAMATINE) 2.5 MG tablet TAKE ONE TABLET BY MOUTH TWICE A DAY (Patient taking differently: as needed ) 60 tablet 5    FreeStyle Lancets MISC 1 each by Does not apply route 4 times daily 200 each 5    Blood Glucose Monitoring Suppl (FREESTYLE LITE) GAETANO 1 Device by Does not apply route 4 times daily      oxyCODONE (ROXICODONE) 5 MG immediate release tablet Take 0.5 mg by mouth daily.  montelukast (SINGULAIR) 10 MG tablet TAKE 1 TABLET NIGHTLY 90 tablet 3    potassium chloride (KLOR-CON M) 10 MEQ extended release tablet TAKE 1 TABLET DAILY 90 tablet 3    warfarin (COUMADIN) 5 MG tablet TAKE 1 TABLET DAILY (Patient taking differently: Review INR prior to administration.   Managed by Dr. Tao Butcher) 100 tablet 3    Insulin Pen Needle (BD PEN NEEDLE JANNET U/F) 32G X 4 MM MISC USE 1 PEN NEEDLE FOUR TIMES A  each 3    magnesium oxide (MAG-OX) 400 MG tablet Take 1 tablet by mouth daily 90 tablet 3    ACETAMINOPHEN PO Take 500 mg by mouth every 6 hours as needed       albuterol (PROVENTIL) (2.5 MG/3ML) 0.083% nebulizer solution Take 3 mLs by nebulization every 6 hours as needed for Wheezing 120 each 3    polyethylene glycol (GLYCOLAX) 17 GM/SCOOP powder Take 17 g by mouth daily       omeprazole (PRILOSEC) 20 MG delayed release capsule Take 20 mg by mouth daily      ondansetron (ZOFRAN) 4 MG tablet Take 1 tablet by mouth 3 times daily as needed for Nausea or Vomiting 30 tablet 0    aspirin 81 MG chewable tablet Take 1 tablet by mouth daily 30 tablet 3    vitamin B-12 500 MCG tablet Take 1 tablet by mouth daily 30 tablet 3     No current facility-administered medications on file prior to visit. Allergies   Allergen Reactions    Vxaxkhvk-Gdgpedh-Ycfbpc [Fluocinolone] Shortness Of Breath    Ciprofloxacin Shortness Of Breath    Diovan [Valsartan] Shortness Of Breath    Flagyl [Metronidazole] Shortness Of Breath     Has taken diflucan at home 12/7/15    Metformin And Related [Metformin And Related] Shortness Of Breath    Benazepril      Other reaction(s): Not Recorded    Morphine      Bad reaction. \"makes her feel horrible\".      Saxagliptin      Other reaction(s): Not Recorded    Levaquin [Levofloxacin] Rash     Past Medical History:   Diagnosis Date    Asthma     Atrial fibrillation (HCC)     Eosinophilia     Hemoptysis     HIGH CHOLESTEROL     Hx of blood clots     Hypertension     Irregular heart beat     Other specified gastritis without mention of hemorrhage     Palpitations     Skin cancer     basal and squamous    Type II or unspecified type diabetes mellitus without mention of complication, not stated as uncontrolled       Past Surgical History:   Procedure Laterality Date    BRONCHOSCOPY  2016    Dr. Anuja Loera - brushings from [address] [place] [address],[facility]-  2018    Dr. Oralia Wagner and 5/3 2021    Cardiac cath, cardioversion and rafat     SECTION      CHOLECYSTECTOMY      COLONOSCOPY  2017    Dr. Conner Vaca - sigmoid diverticulosis, polypectomies x3    COLONOSCOPY  2014    Dr. Conner Vaca - sigmoid diverticulosis, polypectomies x3, internal hemorrhoids    COLONOSCOPY  10/10/2018    w/biopsy performed by Adriana Prabhakar MD at [facility] COLONOSCOPY N/A 2020    COLONOSCOPY DIAGNOSTIC performed by Sanford Rodriguez MD at [address] GRAFT  08/21/2018    Dr. Antwan Mcgill - x3 (LIMA-LAD, L SV-D1-PLV) modified BL MAZE procedure w/obliteration of WAYNE using 45mm AtriClip    INSERTABLE CARDIAC MONITOR Left 08/16/2018    Dr. Paresh Casarez SN# [id number] [employer]    MITRAL VALVE REPLACEMENT  08/21/2018    Dr. Antwan Mcgill - 27mm [employer] Cinch tissue valve    PAIN MANAGEMENT PROCEDURE N/A 12/18/2020    C6-C7 MIDLINE  EPIDURAL STEROID INJECTION WITH FLUOROSCOPY performed by Gladys Ragsdale MD at [address] Bilateral 1/18/2021    BILATERAL T11 TRANSFORAMINAL EPIDURAL STEROID INJECTION WITH FLUOROSCOPY performed by Gladys Ragsdale MD at [address]  TRANSESOPHAGEAL ECHOCARDIOGRAM  08/21/2018    during CABG/MVR    TUNNELED VENOUS CATHETER PLACEMENT Left 08/23/2018    Dr. Elizabeth Gunderson - IJ for HD---since removed    UPPER GASTROINTESTINAL ENDOSCOPY N/A 10/10/2018    w/biopsy performed by Clovis Kuo MD at [address] [place] [address]      Family History   Problem Relation Age of Onset    Cancer Father     Asthma Mother     Hypertension Mother     Heart Disease Mother     High Blood Pressure Mother       Social History     Tobacco Use    Smoking status: Never Smoker    Smokeless tobacco: Never Used   Substance Use Topics    Alcohol use: No        Review of Systems    Objective:    Pulse 85   Temp 97.9 °F (36.6 °C)   SpO2 96%         BP Readings from Last 3 Encounters:   05/06/22 112/60   05/06/22 114/78   03/02/22 (!) 96/55     Wt Readings from Last 3 Encounters:   05/06/22 206 lb (93.4 kg)   05/06/22 204 lb 4 oz (92.6 kg)   03/09/22 206 lb (93.4 kg)     BMI Readings from Last 3 Encounters:   05/06/22 31.32 kg/m²   05/06/22 31.06 kg/m²   03/09/22 31.32 kg/m²       Physical Exam  Vitals reviewed. Constitutional:       Appearance: Normal appearance. She is well-developed. She is obese.    Cardiovascular:      Rate and Rhythm: Normal rate and regular rhythm. Pulses: Normal pulses. Heart sounds: Normal heart sounds. No murmur heard. Pulmonary:      Effort: Pulmonary effort is normal.      Breath sounds: Wheezing and rhonchi present. Abdominal:      General: There is distension. Musculoskeletal:      Right lower leg: No edema. Left lower leg: No edema. Skin:     General: Skin is warm and dry. Neurological:      Mental Status: She is alert and oriented to person, place, and time. Psychiatric:         Mood and Affect: Mood normal.         Assessment/Plan    1. Bronchitis  Advised prednisone 10 mg once daily x 5 days  OTC mucinex  proair  - cephALEXin (KEFLEX) 500 MG capsule; Take 1 capsule by mouth 2 times daily for 10 days  Dispense: 20 capsule; Refill: 0      Return if symptoms worsen or fail to improve, for unresolved symptoms. This dictation was generated by voice recognition computer software. Although all attempts are made to edit the dictation for accuracy, there may be errors in the transcription that are not intended.

## 2022-05-13 ENCOUNTER — TELEPHONE (OUTPATIENT)
Dept: FAMILY MEDICINE CLINIC | Age: 77
End: 2022-05-13

## 2022-05-13 ENCOUNTER — ANTI-COAG VISIT (OUTPATIENT)
Dept: FAMILY MEDICINE CLINIC | Age: 77
End: 2022-05-13

## 2022-05-13 DIAGNOSIS — J20.9 ACUTE BRONCHITIS, UNSPECIFIED ORGANISM: Primary | ICD-10-CM

## 2022-05-13 LAB — INR BLD: 3.3

## 2022-05-13 RX ORDER — AZITHROMYCIN 500 MG/1
500 TABLET, FILM COATED ORAL DAILY
Qty: 5 TABLET | Refills: 0 | Status: ON HOLD
Start: 2022-05-13 | End: 2022-05-23 | Stop reason: HOSPADM

## 2022-05-13 NOTE — TELEPHONE ENCOUNTER
Patient's daughter, Odalis Roldan, calling to report that patient is not feeling better after being seen for a red visit on 5/10/22. Reports that patient's chest is tighter and having a hard time coughing up the mucus. Also sounds more hoarse. No fever reported. Daughter asking if we should try another treatment. Please advise.

## 2022-05-13 NOTE — TELEPHONE ENCOUNTER
Discontinue Keflex antibiotic and a new prescription for Zithromax once a day was sent to the pharmacy.   Also recommend increasing prednisone to twice daily for 5 days

## 2022-05-17 ENCOUNTER — APPOINTMENT (OUTPATIENT)
Dept: GENERAL RADIOLOGY | Age: 77
DRG: 194 | End: 2022-05-17
Payer: MEDICARE

## 2022-05-17 ENCOUNTER — OFFICE VISIT (OUTPATIENT)
Dept: FAMILY MEDICINE CLINIC | Age: 77
End: 2022-05-17
Payer: MEDICARE

## 2022-05-17 ENCOUNTER — HOSPITAL ENCOUNTER (INPATIENT)
Age: 77
LOS: 7 days | Discharge: HOME | DRG: 194 | End: 2022-05-24
Attending: EMERGENCY MEDICINE | Admitting: HOSPITALIST
Payer: MEDICARE

## 2022-05-17 DIAGNOSIS — N17.9 AKI (ACUTE KIDNEY INJURY) (HCC): ICD-10-CM

## 2022-05-17 DIAGNOSIS — R06.00 DYSPNEA, UNSPECIFIED TYPE: ICD-10-CM

## 2022-05-17 DIAGNOSIS — J20.9 ACUTE BRONCHITIS, UNSPECIFIED ORGANISM: Primary | ICD-10-CM

## 2022-05-17 DIAGNOSIS — R06.2 WHEEZING: Primary | ICD-10-CM

## 2022-05-17 DIAGNOSIS — R77.8 ELEVATED TROPONIN: ICD-10-CM

## 2022-05-17 DIAGNOSIS — R07.89 CHEST TIGHTNESS: ICD-10-CM

## 2022-05-17 LAB
A/G RATIO: 1.1 (ref 1.1–2.2)
ALBUMIN SERPL-MCNC: 3.8 G/DL (ref 3.4–5)
ALP BLD-CCNC: 131 U/L (ref 40–129)
ALT SERPL-CCNC: 18 U/L (ref 10–40)
ANION GAP SERPL CALCULATED.3IONS-SCNC: 13 MMOL/L (ref 3–16)
AST SERPL-CCNC: 22 U/L (ref 15–37)
BASOPHILS ABSOLUTE: 0.1 K/UL (ref 0–0.2)
BASOPHILS RELATIVE PERCENT: 0.7 %
BILIRUB SERPL-MCNC: 0.8 MG/DL (ref 0–1)
BUN BLDV-MCNC: 61 MG/DL (ref 7–20)
CALCIUM SERPL-MCNC: 9.3 MG/DL (ref 8.3–10.6)
CHLORIDE BLD-SCNC: 94 MMOL/L (ref 99–110)
CO2: 27 MMOL/L (ref 21–32)
CREAT SERPL-MCNC: 2.6 MG/DL (ref 0.6–1.2)
EOSINOPHILS ABSOLUTE: 0.1 K/UL (ref 0–0.6)
EOSINOPHILS RELATIVE PERCENT: 1.4 %
GFR AFRICAN AMERICAN: 22
GFR NON-AFRICAN AMERICAN: 18
GLUCOSE BLD-MCNC: 175 MG/DL (ref 70–99)
GLUCOSE BLD-MCNC: 180 MG/DL (ref 70–99)
GLUCOSE BLD-MCNC: 372 MG/DL (ref 70–99)
HCT VFR BLD CALC: 40.8 % (ref 36–48)
HEMOGLOBIN: 13.2 G/DL (ref 12–16)
INFLUENZA A: NOT DETECTED
INFLUENZA B: NOT DETECTED
INR BLD: 3.76 (ref 0.88–1.12)
LACTIC ACID, SEPSIS: 1.3 MMOL/L (ref 0.4–1.9)
LYMPHOCYTES ABSOLUTE: 1.2 K/UL (ref 1–5.1)
LYMPHOCYTES RELATIVE PERCENT: 13.7 %
MCH RBC QN AUTO: 28.3 PG (ref 26–34)
MCHC RBC AUTO-ENTMCNC: 32.4 G/DL (ref 31–36)
MCV RBC AUTO: 87.3 FL (ref 80–100)
MONOCYTES ABSOLUTE: 0.6 K/UL (ref 0–1.3)
MONOCYTES RELATIVE PERCENT: 7.1 %
NEUTROPHILS ABSOLUTE: 7 K/UL (ref 1.7–7.7)
NEUTROPHILS RELATIVE PERCENT: 77.1 %
PDW BLD-RTO: 17.2 % (ref 12.4–15.4)
PERFORMED ON: ABNORMAL
PERFORMED ON: ABNORMAL
PLATELET # BLD: 283 K/UL (ref 135–450)
PMV BLD AUTO: 8.4 FL (ref 5–10.5)
POTASSIUM SERPL-SCNC: 4.1 MMOL/L (ref 3.5–5.1)
PRO-BNP: 3211 PG/ML (ref 0–449)
PROCALCITONIN: 0.15 NG/ML (ref 0–0.15)
PROTHROMBIN TIME: 44.9 SEC (ref 9.9–12.7)
RBC # BLD: 4.67 M/UL (ref 4–5.2)
SARS-COV-2 RNA, RT PCR: NOT DETECTED
SODIUM BLD-SCNC: 134 MMOL/L (ref 136–145)
TOTAL PROTEIN: 7.2 G/DL (ref 6.4–8.2)
TROPONIN: 0.04 NG/ML
TROPONIN: 0.05 NG/ML
TROPONIN: 0.06 NG/ML
WBC # BLD: 9.1 K/UL (ref 4–11)

## 2022-05-17 PROCEDURE — 2500000003 HC RX 250 WO HCPCS: Performed by: HOSPITALIST

## 2022-05-17 PROCEDURE — 6370000000 HC RX 637 (ALT 250 FOR IP): Performed by: NURSE PRACTITIONER

## 2022-05-17 PROCEDURE — 85610 PROTHROMBIN TIME: CPT

## 2022-05-17 PROCEDURE — 2580000003 HC RX 258: Performed by: HOSPITALIST

## 2022-05-17 PROCEDURE — 84484 ASSAY OF TROPONIN QUANT: CPT

## 2022-05-17 PROCEDURE — 36415 COLL VENOUS BLD VENIPUNCTURE: CPT

## 2022-05-17 PROCEDURE — 80053 COMPREHEN METABOLIC PANEL: CPT

## 2022-05-17 PROCEDURE — 93005 ELECTROCARDIOGRAM TRACING: CPT | Performed by: EMERGENCY MEDICINE

## 2022-05-17 PROCEDURE — 6360000002 HC RX W HCPCS: Performed by: EMERGENCY MEDICINE

## 2022-05-17 PROCEDURE — 1200000000 HC SEMI PRIVATE

## 2022-05-17 PROCEDURE — 96374 THER/PROPH/DIAG INJ IV PUSH: CPT

## 2022-05-17 PROCEDURE — 6360000002 HC RX W HCPCS: Performed by: NURSE PRACTITIONER

## 2022-05-17 PROCEDURE — 87636 SARSCOV2 & INF A&B AMP PRB: CPT

## 2022-05-17 PROCEDURE — 83605 ASSAY OF LACTIC ACID: CPT

## 2022-05-17 PROCEDURE — 94761 N-INVAS EAR/PLS OXIMETRY MLT: CPT

## 2022-05-17 PROCEDURE — 6370000000 HC RX 637 (ALT 250 FOR IP): Performed by: HOSPITALIST

## 2022-05-17 PROCEDURE — 71045 X-RAY EXAM CHEST 1 VIEW: CPT

## 2022-05-17 PROCEDURE — 99285 EMERGENCY DEPT VISIT HI MDM: CPT

## 2022-05-17 PROCEDURE — 83880 ASSAY OF NATRIURETIC PEPTIDE: CPT

## 2022-05-17 PROCEDURE — 99213 OFFICE O/P EST LOW 20 MIN: CPT | Performed by: NURSE PRACTITIONER

## 2022-05-17 PROCEDURE — 83036 HEMOGLOBIN GLYCOSYLATED A1C: CPT

## 2022-05-17 PROCEDURE — 94640 AIRWAY INHALATION TREATMENT: CPT

## 2022-05-17 PROCEDURE — 84145 PROCALCITONIN (PCT): CPT

## 2022-05-17 PROCEDURE — 85025 COMPLETE CBC W/AUTO DIFF WBC: CPT

## 2022-05-17 RX ORDER — ACETAMINOPHEN 325 MG/1
650 TABLET ORAL EVERY 6 HOURS PRN
Status: DISCONTINUED | OUTPATIENT
Start: 2022-05-17 | End: 2022-05-24 | Stop reason: HOSPADM

## 2022-05-17 RX ORDER — IPRATROPIUM BROMIDE AND ALBUTEROL SULFATE 2.5; .5 MG/3ML; MG/3ML
1 SOLUTION RESPIRATORY (INHALATION) EVERY 4 HOURS PRN
Status: DISCONTINUED | OUTPATIENT
Start: 2022-05-17 | End: 2022-05-24 | Stop reason: HOSPADM

## 2022-05-17 RX ORDER — SODIUM CHLORIDE 9 MG/ML
INJECTION, SOLUTION INTRAVENOUS PRN
Status: DISCONTINUED | OUTPATIENT
Start: 2022-05-17 | End: 2022-05-24 | Stop reason: HOSPADM

## 2022-05-17 RX ORDER — OXYCODONE HYDROCHLORIDE 5 MG/1
5 TABLET ORAL EVERY 6 HOURS PRN
Status: DISCONTINUED | OUTPATIENT
Start: 2022-05-17 | End: 2022-05-17

## 2022-05-17 RX ORDER — HALOPERIDOL 5 MG/ML
2 INJECTION INTRAMUSCULAR ONCE
Status: DISCONTINUED | OUTPATIENT
Start: 2022-05-17 | End: 2022-05-24 | Stop reason: HOSPADM

## 2022-05-17 RX ORDER — DIPHENHYDRAMINE HYDROCHLORIDE 50 MG/ML
25 INJECTION INTRAMUSCULAR; INTRAVENOUS ONCE
Status: DISCONTINUED | OUTPATIENT
Start: 2022-05-17 | End: 2022-05-24 | Stop reason: HOSPADM

## 2022-05-17 RX ORDER — GUAIFENESIN/DEXTROMETHORPHAN 100-10MG/5
5 SYRUP ORAL EVERY 4 HOURS PRN
Status: DISCONTINUED | OUTPATIENT
Start: 2022-05-17 | End: 2022-05-24 | Stop reason: HOSPADM

## 2022-05-17 RX ORDER — PANTOPRAZOLE SODIUM 40 MG/1
40 TABLET, DELAYED RELEASE ORAL
Status: DISCONTINUED | OUTPATIENT
Start: 2022-05-18 | End: 2022-05-24 | Stop reason: HOSPADM

## 2022-05-17 RX ORDER — LEVOTHYROXINE SODIUM 0.1 MG/1
100 TABLET ORAL DAILY
Status: DISCONTINUED | OUTPATIENT
Start: 2022-05-18 | End: 2022-05-24 | Stop reason: HOSPADM

## 2022-05-17 RX ORDER — ASPIRIN 81 MG/1
324 TABLET, CHEWABLE ORAL ONCE
Status: COMPLETED | OUTPATIENT
Start: 2022-05-17 | End: 2022-05-17

## 2022-05-17 RX ORDER — METOPROLOL SUCCINATE 50 MG/1
50 TABLET, EXTENDED RELEASE ORAL DAILY
Status: DISCONTINUED | OUTPATIENT
Start: 2022-05-18 | End: 2022-05-24 | Stop reason: HOSPADM

## 2022-05-17 RX ORDER — SODIUM CHLORIDE 0.9 % (FLUSH) 0.9 %
5-40 SYRINGE (ML) INJECTION EVERY 12 HOURS SCHEDULED
Status: DISCONTINUED | OUTPATIENT
Start: 2022-05-17 | End: 2022-05-24 | Stop reason: HOSPADM

## 2022-05-17 RX ORDER — HYDROMORPHONE HYDROCHLORIDE 1 MG/ML
0.5 INJECTION, SOLUTION INTRAMUSCULAR; INTRAVENOUS; SUBCUTANEOUS ONCE
Status: COMPLETED | OUTPATIENT
Start: 2022-05-17 | End: 2022-05-17

## 2022-05-17 RX ORDER — INSULIN GLARGINE 100 [IU]/ML
22 INJECTION, SOLUTION SUBCUTANEOUS NIGHTLY
Status: DISCONTINUED | OUTPATIENT
Start: 2022-05-17 | End: 2022-05-18

## 2022-05-17 RX ORDER — ACETAMINOPHEN 650 MG/1
650 SUPPOSITORY RECTAL EVERY 6 HOURS PRN
Status: DISCONTINUED | OUTPATIENT
Start: 2022-05-17 | End: 2022-05-24 | Stop reason: HOSPADM

## 2022-05-17 RX ORDER — ALBUTEROL SULFATE 2.5 MG/3ML
2.5 SOLUTION RESPIRATORY (INHALATION) EVERY 6 HOURS PRN
Status: DISCONTINUED | OUTPATIENT
Start: 2022-05-17 | End: 2022-05-17 | Stop reason: SDUPTHER

## 2022-05-17 RX ORDER — DEXTROSE MONOHYDRATE 50 MG/ML
100 INJECTION, SOLUTION INTRAVENOUS PRN
Status: DISCONTINUED | OUTPATIENT
Start: 2022-05-17 | End: 2022-05-24 | Stop reason: HOSPADM

## 2022-05-17 RX ORDER — ASPIRIN 81 MG/1
81 TABLET, CHEWABLE ORAL DAILY
Status: DISCONTINUED | OUTPATIENT
Start: 2022-05-18 | End: 2022-05-24 | Stop reason: HOSPADM

## 2022-05-17 RX ORDER — INSULIN LISPRO 100 [IU]/ML
0-6 INJECTION, SOLUTION INTRAVENOUS; SUBCUTANEOUS
Status: DISCONTINUED | OUTPATIENT
Start: 2022-05-17 | End: 2022-05-18

## 2022-05-17 RX ORDER — CALCITRIOL 0.25 UG/1
0.25 CAPSULE, LIQUID FILLED ORAL DAILY
Status: DISCONTINUED | OUTPATIENT
Start: 2022-05-18 | End: 2022-05-24 | Stop reason: HOSPADM

## 2022-05-17 RX ORDER — IPRATROPIUM BROMIDE AND ALBUTEROL SULFATE 2.5; .5 MG/3ML; MG/3ML
1 SOLUTION RESPIRATORY (INHALATION) ONCE
Status: COMPLETED | OUTPATIENT
Start: 2022-05-17 | End: 2022-05-17

## 2022-05-17 RX ORDER — METHYLPREDNISOLONE SODIUM SUCCINATE 125 MG/2ML
125 INJECTION, POWDER, LYOPHILIZED, FOR SOLUTION INTRAMUSCULAR; INTRAVENOUS DAILY
Status: DISCONTINUED | OUTPATIENT
Start: 2022-05-17 | End: 2022-05-17 | Stop reason: SDUPTHER

## 2022-05-17 RX ORDER — ONDANSETRON 2 MG/ML
4 INJECTION INTRAMUSCULAR; INTRAVENOUS EVERY 6 HOURS PRN
Status: DISCONTINUED | OUTPATIENT
Start: 2022-05-17 | End: 2022-05-24 | Stop reason: HOSPADM

## 2022-05-17 RX ORDER — POLYETHYLENE GLYCOL 3350 17 G/17G
17 POWDER, FOR SOLUTION ORAL DAILY PRN
Status: DISCONTINUED | OUTPATIENT
Start: 2022-05-17 | End: 2022-05-24 | Stop reason: HOSPADM

## 2022-05-17 RX ORDER — MONTELUKAST SODIUM 10 MG/1
10 TABLET ORAL NIGHTLY
Status: DISCONTINUED | OUTPATIENT
Start: 2022-05-17 | End: 2022-05-24 | Stop reason: HOSPADM

## 2022-05-17 RX ORDER — SODIUM CHLORIDE 0.9 % (FLUSH) 0.9 %
5-40 SYRINGE (ML) INJECTION PRN
Status: DISCONTINUED | OUTPATIENT
Start: 2022-05-17 | End: 2022-05-24 | Stop reason: HOSPADM

## 2022-05-17 RX ORDER — HYDROCODONE BITARTRATE AND ACETAMINOPHEN 5; 325 MG/1; MG/1
1 TABLET ORAL ONCE
Status: COMPLETED | OUTPATIENT
Start: 2022-05-17 | End: 2022-05-17

## 2022-05-17 RX ORDER — WARFARIN SODIUM 5 MG/1
5 TABLET ORAL DAILY
Status: DISCONTINUED | OUTPATIENT
Start: 2022-05-18 | End: 2022-05-17 | Stop reason: DRUGHIGH

## 2022-05-17 RX ORDER — ENOXAPARIN SODIUM 100 MG/ML
30 INJECTION SUBCUTANEOUS DAILY
Status: DISCONTINUED | OUTPATIENT
Start: 2022-05-17 | End: 2022-05-17

## 2022-05-17 RX ORDER — LANOLIN ALCOHOL/MO/W.PET/CERES
400 CREAM (GRAM) TOPICAL DAILY
Status: DISCONTINUED | OUTPATIENT
Start: 2022-05-18 | End: 2022-05-24 | Stop reason: HOSPADM

## 2022-05-17 RX ORDER — INSULIN LISPRO 100 [IU]/ML
0-3 INJECTION, SOLUTION INTRAVENOUS; SUBCUTANEOUS NIGHTLY
Status: DISCONTINUED | OUTPATIENT
Start: 2022-05-17 | End: 2022-05-18

## 2022-05-17 RX ORDER — ALBUTEROL SULFATE 90 UG/1
1 AEROSOL, METERED RESPIRATORY (INHALATION) EVERY 6 HOURS PRN
Status: DISCONTINUED | OUTPATIENT
Start: 2022-05-17 | End: 2022-05-24 | Stop reason: HOSPADM

## 2022-05-17 RX ORDER — ONDANSETRON 4 MG/1
4 TABLET, ORALLY DISINTEGRATING ORAL EVERY 8 HOURS PRN
Status: DISCONTINUED | OUTPATIENT
Start: 2022-05-17 | End: 2022-05-24 | Stop reason: HOSPADM

## 2022-05-17 RX ORDER — METHYLPREDNISOLONE SODIUM SUCCINATE 40 MG/ML
40 INJECTION, POWDER, LYOPHILIZED, FOR SOLUTION INTRAMUSCULAR; INTRAVENOUS EVERY 12 HOURS
Status: DISCONTINUED | OUTPATIENT
Start: 2022-05-18 | End: 2022-05-19

## 2022-05-17 RX ORDER — OXYCODONE HYDROCHLORIDE 5 MG/1
5 TABLET ORAL EVERY 4 HOURS PRN
Status: DISCONTINUED | OUTPATIENT
Start: 2022-05-17 | End: 2022-05-24 | Stop reason: HOSPADM

## 2022-05-17 RX ORDER — AMIODARONE HYDROCHLORIDE 200 MG/1
100 TABLET ORAL DAILY
Status: DISCONTINUED | OUTPATIENT
Start: 2022-05-18 | End: 2022-05-24 | Stop reason: HOSPADM

## 2022-05-17 RX ORDER — IPRATROPIUM BROMIDE AND ALBUTEROL SULFATE 2.5; .5 MG/3ML; MG/3ML
1 SOLUTION RESPIRATORY (INHALATION)
Status: DISCONTINUED | OUTPATIENT
Start: 2022-05-17 | End: 2022-05-21

## 2022-05-17 RX ADMIN — MONTELUKAST SODIUM 10 MG: 10 TABLET, FILM COATED ORAL at 22:32

## 2022-05-17 RX ADMIN — IPRATROPIUM BROMIDE AND ALBUTEROL SULFATE 1 AMPULE: .5; 3 SOLUTION RESPIRATORY (INHALATION) at 16:50

## 2022-05-17 RX ADMIN — ASPIRIN 81 MG 324 MG: 81 TABLET ORAL at 16:46

## 2022-05-17 RX ADMIN — METHYLPREDNISOLONE SODIUM SUCCINATE 125 MG: 125 INJECTION, POWDER, FOR SOLUTION INTRAMUSCULAR; INTRAVENOUS at 16:41

## 2022-05-17 RX ADMIN — ALBUTEROL SULFATE 2.5 MG: 2.5 SOLUTION RESPIRATORY (INHALATION) at 16:51

## 2022-05-17 RX ADMIN — HYDROCODONE BITARTRATE AND ACETAMINOPHEN 1 TABLET: 5; 325 TABLET ORAL at 16:39

## 2022-05-17 RX ADMIN — DOXYCYCLINE 100 MG: 100 INJECTION, POWDER, LYOPHILIZED, FOR SOLUTION INTRAVENOUS at 22:30

## 2022-05-17 RX ADMIN — SODIUM CHLORIDE 25 ML: 9 INJECTION, SOLUTION INTRAVENOUS at 22:28

## 2022-05-17 RX ADMIN — HYDROMORPHONE HYDROCHLORIDE 0.5 MG: 1 INJECTION, SOLUTION INTRAMUSCULAR; INTRAVENOUS; SUBCUTANEOUS at 17:48

## 2022-05-17 RX ADMIN — Medication 10 ML: at 22:30

## 2022-05-17 RX ADMIN — IPRATROPIUM BROMIDE AND ALBUTEROL SULFATE 1 AMPULE: 2.5; .5 SOLUTION RESPIRATORY (INHALATION) at 21:26

## 2022-05-17 RX ADMIN — OXYCODONE 5 MG: 5 TABLET ORAL at 18:59

## 2022-05-17 RX ADMIN — INSULIN LISPRO 3 UNITS: 100 INJECTION, SOLUTION INTRAVENOUS; SUBCUTANEOUS at 22:41

## 2022-05-17 ASSESSMENT — ENCOUNTER SYMPTOMS
COUGH: 1
SHORTNESS OF BREATH: 1
WHEEZING: 1
NAUSEA: 0
DIARRHEA: 0
VOMITING: 0
ABDOMINAL PAIN: 0
CHEST TIGHTNESS: 1

## 2022-05-17 ASSESSMENT — PAIN SCALES - GENERAL
PAINLEVEL_OUTOF10: 6
PAINLEVEL_OUTOF10: 10
PAINLEVEL_OUTOF10: 9
PAINLEVEL_OUTOF10: 2
PAINLEVEL_OUTOF10: 6

## 2022-05-17 ASSESSMENT — PAIN DESCRIPTION - ORIENTATION
ORIENTATION: LOWER
ORIENTATION: MID

## 2022-05-17 ASSESSMENT — PAIN DESCRIPTION - DESCRIPTORS
DESCRIPTORS: ACHING;SHARP
DESCRIPTORS: ACHING;SHARP;DISCOMFORT

## 2022-05-17 ASSESSMENT — PAIN DESCRIPTION - LOCATION
LOCATION: BACK
LOCATION: CHEST

## 2022-05-17 ASSESSMENT — PAIN - FUNCTIONAL ASSESSMENT: PAIN_FUNCTIONAL_ASSESSMENT: 0-10

## 2022-05-17 ASSESSMENT — PAIN DESCRIPTION - PAIN TYPE
TYPE: CHRONIC PAIN
TYPE: CHRONIC PAIN

## 2022-05-17 NOTE — PROGRESS NOTES
Orion Brown  : 1945  Encounter date: 2022    This raghu 77 y.o. female who presents with  No chief complaint on file. History of present illness:    HPI Pt is 77year old woman with daughter for cough and dyspnea. Pt recently seen on 5/10/22 for bronchitis, treated with keflex and proair. Pt then called back to office and given zithromax. Pt with CHF, being seen by SOLEDAD Brown. Pt has tried inhalers and nebulizers with little relief. Pt desats to 89% with little movement. Pt appears weakened and ill. Advised immediately to go to ED, call placed to receiving RN.     Current Outpatient Medications on File Prior to Visit   Medication Sig Dispense Refill    azithromycin (ZITHROMAX) 500 MG tablet Take 1 tablet by mouth daily for 5 days 5 tablet 0    calcitRIOL (ROCALTROL) 0.25 MCG capsule Take 0.25 mcg by mouth daily       insulin lispro, 1 Unit Dial, (HUMALOG KWIKPEN) 100 UNIT/ML SOPN 5 units plusTID Insulin dosage per Sliding scale-140-199-2 units, 200-249- 3 units, 250-300-4 units, >300 take 5 units, max dose per day of 50 units 15 mL 2    predniSONE (DELTASONE) 10 MG tablet TAKE 1 TABLET AS NEEDED (EOSINOPHILIC GASATRITIS) (Patient taking differently: as needed TAKE 1 TABLET AS NEEDED (EOSINOPHILIC GASATRITIS)) 805 tablet 0    metOLazone (ZAROXOLYN) 2.5 MG tablet TAKE 1 TABLET ON TUESDAY AND FRIDAY AND AS DIRECTED 30 tablet 1    spironolactone (ALDACTONE) 25 MG tablet TAKE 1 TABLET TWICE A  tablet 1    metoprolol succinate (TOPROL XL) 50 MG extended release tablet TAKE 1 TABLET DAILY 90 tablet 0    vitamin D (ERGOCALCIFEROL) 1.25 MG (25365 UT) CAPS capsule TAKE ONE CAPSULE BY MOUTH ONCE WEEKLY 12 capsule 1    amiodarone (CORDARONE) 200 MG tablet TAKE 1 TABLET DAILY (Patient taking differently: Take 100 mg by mouth daily ) 60 tablet 5    torsemide (DEMADEX) 20 MG tablet TAKE 2 TABLETS TWICE A  tablet 1    nystatin (MYCOSTATIN) 705516 UNIT/ML suspension TAKE ONE TEASPOONFUL BY MOUTH FOUR TIMES A DAY FOR 5 DAYS 120 mL 1    levothyroxine (SYNTHROID) 100 MCG tablet Take 1 tablet by mouth daily 90 tablet 1    blood glucose test strips (FREESTYLE LITE) strip USE FOUR TIMES A  strip 3    insulin glargine (LANTUS SOLOSTAR) 100 UNIT/ML injection pen INJECT 20 UNITS IN THE MORNING (Patient taking differently: 22 Units INJECT 20 UNITS IN THE MORNING) 90 mL 1    albuterol sulfate  (90 Base) MCG/ACT inhaler USE 2 INHALATIONS EVERY 6 HOURS AS NEEDED FOR WHEEZING 25.5 g 2    ipratropium-albuterol (DUONEB) 0.5-2.5 (3) MG/3ML SOLN nebulizer solution Inhale 3 mLs into the lungs every 4 hours as needed for Shortness of Breath 120 each 0    midodrine (PROAMATINE) 2.5 MG tablet TAKE ONE TABLET BY MOUTH TWICE A DAY (Patient taking differently: as needed ) 60 tablet 5    FreeStyle Lancets MISC 1 each by Does not apply route 4 times daily 200 each 5    Blood Glucose Monitoring Suppl (FREESTYLE LITE) GAETANO 1 Device by Does not apply route 4 times daily      oxyCODONE (ROXICODONE) 5 MG immediate release tablet Take 0.5 mg by mouth daily.  montelukast (SINGULAIR) 10 MG tablet TAKE 1 TABLET NIGHTLY 90 tablet 3    potassium chloride (KLOR-CON M) 10 MEQ extended release tablet TAKE 1 TABLET DAILY 90 tablet 3    warfarin (COUMADIN) 5 MG tablet TAKE 1 TABLET DAILY (Patient taking differently: Review INR prior to administration.   Managed by Dr. Tomy Lackey) 100 tablet 3    Insulin Pen Needle (BD PEN NEEDLE JANNET U/F) 32G X 4 MM MISC USE 1 PEN NEEDLE FOUR TIMES A  each 3    magnesium oxide (MAG-OX) 400 MG tablet Take 1 tablet by mouth daily 90 tablet 3    ACETAMINOPHEN PO Take 500 mg by mouth every 6 hours as needed       albuterol (PROVENTIL) (2.5 MG/3ML) 0.083% nebulizer solution Take 3 mLs by nebulization every 6 hours as needed for Wheezing 120 each 3    polyethylene glycol (GLYCOLAX) 17 GM/SCOOP powder Take 17 g by mouth daily       omeprazole (PRILOSEC) 20 MG delayed release capsule Take 20 mg by mouth daily      ondansetron (ZOFRAN) 4 MG tablet Take 1 tablet by mouth 3 times daily as needed for Nausea or Vomiting 30 tablet 0    aspirin 81 MG chewable tablet Take 1 tablet by mouth daily 30 tablet 3    vitamin B-12 500 MCG tablet Take 1 tablet by mouth daily 30 tablet 3     No current facility-administered medications on file prior to visit. Allergies   Allergen Reactions    Wyvtwyyu-Ofmznvk-Vzftdn [Fluocinolone] Shortness Of Breath    Ciprofloxacin Shortness Of Breath    Diovan [Valsartan] Shortness Of Breath    Flagyl [Metronidazole] Shortness Of Breath     Has taken diflucan at home 12/7/15    Metformin And Related [Metformin And Related] Shortness Of Breath    Benazepril      Other reaction(s): Not Recorded    Morphine      Bad reaction. \"makes her feel horrible\".      Saxagliptin      Other reaction(s): Not Recorded    Levaquin [Levofloxacin] Rash     Past Medical History:   Diagnosis Date    Asthma     Atrial fibrillation (HCC)     Eosinophilia     Hemoptysis     HIGH CHOLESTEROL     Hx of blood clots     Hypertension     Irregular heart beat     Other specified gastritis without mention of hemorrhage     Palpitations     Skin cancer     basal and squamous    Type II or unspecified type diabetes mellitus without mention of complication, not stated as uncontrolled       Past Surgical History:   Procedure Laterality Date    BRONCHOSCOPY  2016    Dr. Michell Burgos - brushings from [address] [place] [address],[place] [address]  2018    Dr. Jaycee Lay and 5/3 2021    Cardiac cath, cardioversion and rafat     SECTION      CHOLECYSTECTOMY      COLONOSCOPY  2017    Dr. Sal Carias - sigmoid diverticulosis, polypectomies x3    COLONOSCOPY  2014    Dr. Sal Carias - sigmoid diverticulosis, polypectomies x3, internal hemorrhoids    COLONOSCOPY  10/10/2018    w/biopsy performed by Luis Ca MD at [address] [facility] ENDOSCOPY    COLONOSCOPY N/A 8/7/2020    COLONOSCOPY DIAGNOSTIC performed by Josselin Ceballos MD at [address]  08/21/2018    Dr. Katja Bradley - x3 (LIMA-LAD, L SV-D1-PLV) modified BL MAZE procedure w/obliteration of WAYNE using 45mm AtriClip    Carisa Slough Left 08/16/2018    [id number] [employer]    MITRAL VALVE REPLACEMENT  08/21/2018    Dr. Katja Bradley - 27mm Med[employer]onic Cinch tissue valve    PAIN MANAGEMENT PROCEDURE N/A 12/18/2020    C6-C7 MIDLINE  EPIDURAL STEROID INJECTION WITH FLUOROSCOPY performed by Tonya Bronson MD at [address] Bilateral 1/18/2021    BILATERAL T11 TRANSFORAMINAL EPIDURAL STEROID INJECTION WITH FLUOROSCOPY performed by Tonya Bronson MD at [address] TRANSESOPHAGEAL ECHOCARDIOGRAM  08/21/2018    during CABG/MVR    TUNNELED VENOUS CATHETER PLACEMENT Left 08/23/2018    Dr. Norma Bassett - IJ for HD---since removed    UPPER GASTROINTESTINAL ENDOSCOPY N/A 10/10/2018    w/biopsy performed by Karolyn Buchanan MD at [address]      Family History   Problem Relation Age of Onset    Cancer Father     Asthma Mother     Hypertension Mother     Heart Disease Mother     High Blood Pressure Mother       Social History     Tobacco Use    Smoking status: Never Smoker    Smokeless tobacco: Never Used   Substance Use Topics    Alcohol use: No        Review of Systems    Objective: There were no vitals taken for this visit. BP Readings from Last 3 Encounters:   05/06/22 112/60   05/06/22 114/78   03/02/22 (!) 96/55     Wt Readings from Last 3 Encounters:   05/06/22 206 lb (93.4 kg)   05/06/22 204 lb 4 oz (92.6 kg)   03/09/22 206 lb (93.4 kg)     BMI Readings from Last 3 Encounters:   05/06/22 31.32 kg/m²   05/06/22 31.06 kg/m²   03/09/22 31.32 kg/m²       Physical Exam  Constitutional:       Appearance: She is ill-appearing.    Cardiovascular:      Rate and Rhythm: Normal rate and regular rhythm. Pulses: Normal pulses. Heart sounds: Normal heart sounds. Pulmonary:      Breath sounds: Wheezing and rhonchi present. Abdominal:      General: Bowel sounds are normal. There is distension. Palpations: Abdomen is soft. Musculoskeletal:      Right lower leg: No edema. Left lower leg: No edema. Neurological:      Motor: Weakness present. Gait: Gait abnormal.         Assessment/Plan    1. Acute bronchitis, unspecified organism  Go immediately to ED  Reports called to [facility]    2. Dyspnea, unspecified type      Return if symptoms worsen or fail to improve, for unresolved symptoms. This dictation was generated by voice recognition computer software. Although all attempts are made to edit the dictation for accuracy, there may be errors in the transcription that are not intended.

## 2022-05-17 NOTE — CONSULTS
Clinical Pharmacy Note: Pharmacy to Dose Warfarin    Pharmacy consulted by Dr. Felisa Woodard to dose warfarin. Kendra Corcoran is a 77 y.o. female  is receiving warfarin for indication: AFib. INR Goal Range: 2.0-3.0  Prior to admission warfarin dosing regimen: 2.5mg Mon/Wed/Fri, 5mg all other days    INR today:   Lab Results   Component Value Date    INR 3.76 05/17/2022       Assessment/Plan:  INR is supratherapeutic on prior to admission dosing regimen. Possible concomitant drug-drug interactions include: methylprednisolone, amiodarone (taking prior to admission), and doxycycline  Based on today's assessment, hold warfarin this evening. Daily INR is ordered. Pharmacy will continue to monitor and make adjustments to regimen as necessary.      Thank you for the consult,     Richi Drew PharmFRANCIS  5/17/2022 7:08 PM

## 2022-05-17 NOTE — H&P
HOSPITALISTS HISTORY AND PHYSICAL    5/17/2022 4:55 PM    Patient Information:  Radhames Jenkins is a 77 y.o. female [id number]  PCP:  Keren Mejia MD (Tel: [phone] )    Chief complaint:    Chief Complaint   Patient presents with    Shortness of Breath     Cough and shortness of breath x 8 days, advised by Dr. Live Card office to come to emergency room. History of Present Illness:  Diane Palm is a 77 y.o. female who presented with shortness of breath. Patient ongoing for more than a week or 2. Patient has been treated for bronchitis with multiple antibiotics. Patient continues to feel worse. Was in the PCPs office with complaining of cough and shortness of breath was sent to ER for further evaluation. Patient with history of asthma as a nebulizer. Has been having intermittent chest pain as well    Blood did not help control the symptoms. Nothing that makes it better or worse. Previously getting worse no fever chills. Clear productive sputum. REVIEW OF SYSTEMS:   Constitutional: Negative for fever,chills or night sweats  ENT: Negative for rhinorrhea, epistaxis, hoarseness, sore throat. Respiratory: Negative for shortness of breath,wheezing  Cardiovascular: Negative for chest pain, palpitations   Gastrointestinal: Negative for nausea, vomiting, diarrhea  Genitourinary: Negative for polyuria, dysuria   Hematologic/Lymphatic: Negative for bleeding tendency, easy bruising  Musculoskeletal: Negative for myalgias and arthralgias  Neurologic: Negative for confusion,dysarthria. Skin: Negative for itching,rash, good capillary refill. Psychiatric: Negative for depression,anxiety, agitation. Endocrine: Negative for polydipsia,polyuria,heat /cold intolerance.     Past Medical History:   has a past medical history of Asthma, Atrial fibrillation ([facility] [place] [address] Eosinophilia, Hemoptysis, HIGH CHOLESTEROL, Hx of blood clots, Hypertension, Irregular heart beat, Other specified gastritis without mention of hemorrhage, Palpitations, Skin cancer, and Type II or unspecified type diabetes mellitus without mention of complication, not stated as uncontrolled. Past Surgical History:   has a past surgical history that includes Cholecystectomy;  section; Colonoscopy (2017); skin biopsy; bronchoscopy (2016); Coronary artery bypass graft (2018); Mitral valve replacement (2018); transesophageal echocardiogram (2018); Tunneled venous catheter placement (Left, 2018); Cardiac catheterization (2018); Insertable Cardiac Monitor (Left, 2018); Colonoscopy (2014); Upper gastrointestinal endoscopy (N/A, 10/10/2018); Colonoscopy (10/10/2018); Colonoscopy (N/A, 2020); Pain management procedure (N/A, 2020); Pain management procedure (Bilateral, 2021); and Cardiac catheterization ( and 5/3 2021). Medications:  No current facility-administered medications on file prior to encounter.      Current Outpatient Medications on File Prior to Encounter   Medication Sig Dispense Refill    azithromycin (ZITHROMAX) 500 MG tablet Take 1 tablet by mouth daily for 5 days 5 tablet 0    calcitRIOL (ROCALTROL) 0.25 MCG capsule Take 0.25 mcg by mouth daily       insulin lispro, 1 Unit Dial, (HUMALOG KWIKPEN) 100 UNIT/ML SOPN 5 units plusTID Insulin dosage per Sliding scale-140-199-2 units, 200-249- 3 units, 250-300-4 units, >300 take 5 units, max dose per day of 50 units 15 mL 2    predniSONE (DELTASONE) 10 MG tablet TAKE 1 TABLET AS NEEDED (EOSINOPHILIC GASATRITIS) (Patient taking differently: as needed TAKE 1 TABLET AS NEEDED (EOSINOPHILIC GASATRITIS)) 244 tablet 0    metOLazone (ZAROXOLYN) 2.5 MG tablet TAKE 1 TABLET ON TUESDAY AND FRIDAY AND AS DIRECTED 30 tablet 1    spironolactone (ALDACTONE) 25 MG tablet TAKE 1 TABLET TWICE A  tablet 1    metoprolol succinate (TOPROL XL) 50 MG extended release tablet TAKE 1 TABLET DAILY 90 tablet 0    vitamin D (ERGOCALCIFEROL) 1.25 MG (16054 UT) CAPS capsule TAKE ONE CAPSULE BY MOUTH ONCE WEEKLY 12 capsule 1    amiodarone (CORDARONE) 200 MG tablet TAKE 1 TABLET DAILY (Patient taking differently: Take 100 mg by mouth daily ) 60 tablet 5    torsemide (DEMADEX) 20 MG tablet TAKE 2 TABLETS TWICE A  tablet 1    nystatin (MYCOSTATIN) 304911 UNIT/ML suspension TAKE ONE TEASPOONFUL BY MOUTH FOUR TIMES A DAY FOR 5 DAYS 120 mL 1    levothyroxine (SYNTHROID) 100 MCG tablet Take 1 tablet by mouth daily 90 tablet 1    blood glucose test strips (FREESTYLE LITE) strip USE FOUR TIMES A  strip 3    insulin glargine (LANTUS SOLOSTAR) 100 UNIT/ML injection pen INJECT 20 UNITS IN THE MORNING (Patient taking differently: 22 Units INJECT 20 UNITS IN THE MORNING) 90 mL 1    albuterol sulfate  (90 Base) MCG/ACT inhaler USE 2 INHALATIONS EVERY 6 HOURS AS NEEDED FOR WHEEZING 25.5 g 2    ipratropium-albuterol (DUONEB) 0.5-2.5 (3) MG/3ML SOLN nebulizer solution Inhale 3 mLs into the lungs every 4 hours as needed for Shortness of Breath 120 each 0    midodrine (PROAMATINE) 2.5 MG tablet TAKE ONE TABLET BY MOUTH TWICE A DAY (Patient taking differently: as needed ) 60 tablet 5    FreeStyle Lancets MISC 1 each by Does not apply route 4 times daily 200 each 5    Blood Glucose Monitoring Suppl (FREESTYLE LITE) GAETANO 1 Device by Does not apply route 4 times daily      oxyCODONE (ROXICODONE) 5 MG immediate release tablet Take 0.5 mg by mouth daily.  montelukast (SINGULAIR) 10 MG tablet TAKE 1 TABLET NIGHTLY 90 tablet 3    potassium chloride (KLOR-CON M) 10 MEQ extended release tablet TAKE 1 TABLET DAILY 90 tablet 3    warfarin (COUMADIN) 5 MG tablet TAKE 1 TABLET DAILY (Patient taking differently: Review INR prior to administration.   Managed by Dr. Johnathan Silverman) 100 tablet 3    Insulin Pen Needle (BD PEN NEEDLE JANNET U/F) 32G X 4 MM MISC USE 1 PEN NEEDLE FOUR TIMES A  each 3  magnesium oxide (MAG-OX) 400 MG tablet Take 1 tablet by mouth daily 90 tablet 3    ACETAMINOPHEN PO Take 500 mg by mouth every 6 hours as needed       albuterol (PROVENTIL) (2.5 MG/3ML) 0.083% nebulizer solution Take 3 mLs by nebulization every 6 hours as needed for Wheezing 120 each 3    polyethylene glycol (GLYCOLAX) 17 GM/SCOOP powder Take 17 g by mouth daily       omeprazole (PRILOSEC) 20 MG delayed release capsule Take 20 mg by mouth daily      ondansetron (ZOFRAN) 4 MG tablet Take 1 tablet by mouth 3 times daily as needed for Nausea or Vomiting 30 tablet 0    aspirin 81 MG chewable tablet Take 1 tablet by mouth daily 30 tablet 3    vitamin B-12 500 MCG tablet Take 1 tablet by mouth daily 30 tablet 3       Allergies: Allergies   Allergen Reactions    Ahejvgfe-Ykdcmmn-Ctjplp [Fluocinolone] Shortness Of Breath    Ciprofloxacin Shortness Of Breath    Diovan [Valsartan] Shortness Of Breath    Flagyl [Metronidazole] Shortness Of Breath     Has taken diflucan at home 12/7/15    Metformin And Related [Metformin And Related] Shortness Of Breath    Benazepril      Other reaction(s): Not Recorded    Morphine      Bad reaction. \"makes her feel horrible\".  Saxagliptin      Other reaction(s): Not Recorded    Levaquin [Levofloxacin] Rash        Social History:   reports that she has never smoked. She has never used smokeless tobacco. She reports that she does not drink alcohol and does not use drugs. Family History:  family history includes Asthma in her mother; Cancer in her father; Heart Disease in her mother; High Blood Pressure in her mother; Hypertension in her mother. ,     Physical Exam:  /60   Pulse 74   Temp 98.2 °F (36.8 °C) (Oral)   Resp 16   Ht 5' 8\" (1.727 m)   Wt 196 lb (88.9 kg)   SpO2 95%   BMI 29.80 kg/m²     General appearance:  Appears comfortable. Well nourished  Eyes: Sclera clear, pupils equal  ENT: Moist mucus membranes, no thrush.  Trachea midline. Cardiovascular: Regular rhythm, normal S1, S2. No murmur, gallop, rub. No edema in lower extremities  Respiratory: Diffuse wheezing bilaterally  Gastrointestinal: Abdomen soft, non-tender, not distended, normal bowel sounds  Musculoskeletal: No cyanosis in digits, neck supple  Neurology: Cranial nerves grossly intact. Alert and oriented in time, place and person. No speech or motor deficits  Psychiatry: Appropriate affect. Not agitated  Skin: Warm, dry, normal turgor, no rash    Labs:  CBC:   Lab Results   Component Value Date    WBC 9.1 05/17/2022    RBC 4.67 05/17/2022    HGB 13.2 05/17/2022    HCT 40.8 05/17/2022    MCV 87.3 05/17/2022    MCH 28.3 05/17/2022    MCHC 32.4 05/17/2022    RDW 17.2 05/17/2022     05/17/2022    MPV 8.4 05/17/2022     BMP:    Lab Results   Component Value Date     05/17/2022    K 4.1 05/17/2022    K 3.5 04/21/2021    CL 94 05/17/2022    CO2 27 05/17/2022    BUN 61 05/17/2022    CREATININE 2.6 05/17/2022    CALCIUM 9.3 05/17/2022    GFRAA 22 05/17/2022    LABGLOM 18 05/17/2022    LABGLOM 45 12/06/2018    GLUCOSE 175 05/17/2022       Chest Xray:   EKG:    I visualized CXR images and EKG strips        Problem List  Principal Problem:    TORIBIO (acute kidney injury) ([facility] [place] [address]  Resolved Problems:    * No resolved hospital problems. *        Assessment/Plan:   Chest pain with shortness of breath  -This is more respiratory rather than cardiac patient does have elevated troponin but given elevated creatinine this could be nonzero  -Trend troponin for now monitor on telemetry  -The underlying cause which is wheezing and probably asthma with bronchitis  -He has failed multiple antibiotics.   Treated with IV doxycycline which she has not tried  -Continue DuoNeb breathing treatment steroids.  -She continues to have chest tightness and symptoms plan we will consult cardiology      Acute on chronic kidney disease  -Multiple cardiac and renal meds that could potentially worsen the symptoms  -Hold all medications for now follows nephrology will consult them  -Monitor closely. Chronic A. Fib  -On Coumadin will continue.         Con Leo MD    5/17/2022 4:55 PM

## 2022-05-17 NOTE — ED NOTES
Pt found sitting on a side of a bed and her daughter was massaging her back. Pt advised to sit back in a bed with rails up to avoid a fall. Pt's daughter stated Dany Stair will not fall\".       Luís Yeung RN  05/17/22 1457

## 2022-05-17 NOTE — ED PROVIDER NOTES
breath and wheezing. Cardiovascular: Negative for chest pain. Gastrointestinal: Negative for abdominal pain, diarrhea, nausea and vomiting. Genitourinary: Negative for dysuria. All other systems reviewed and are negative. Positives and Pertinent negatives as per HPI. Except as noted above in the ROS, all other systems were reviewed and negative.        PAST MEDICAL HISTORY     Past Medical History:   Diagnosis Date    Asthma     Atrial fibrillation (HCC)     Eosinophilia     Hemoptysis     HIGH CHOLESTEROL     Hx of blood clots     Hypertension     Irregular heart beat     Other specified gastritis without mention of hemorrhage     Palpitations     Skin cancer     basal and squamous    Type II or unspecified type diabetes mellitus without mention of complication, not stated as uncontrolled          SURGICAL HISTORY     Past Surgical History:   Procedure Laterality Date    BRONCHOSCOPY  2016    Dr. Khadijah Hoyos - brushings from [address] [place] [address],42-10  2018    Dr. Cnoy Parry and 5/3 2021    Cardiac cath, cardioversion and rafat     SECTION      CHOLECYSTECTOMY      COLONOSCOPY  2017    Dr. Tian Calderon - sigmoid diverticulosis, polypectomies x3    COLONOSCOPY  2014    Dr. Tian Calderon - sigmoid diverticulosis, polypectomies x3, internal hemorrhoids    COLONOSCOPY  10/10/2018    w/biopsy performed by Juanita Cho MD at [address] [facility] COLONOSCOPY N/A 2020    COLONOS[facility] DIAGNOSTIC [facility]  [facility]    Dr. Re David - x3 (LIMA-LAD, L SV-D1-PLV) modified BL MAZE procedure w/obliteration of WAYNE using 45mm AtriClip    INSERTABLE CARDIAC MONITOR Left 2018    Dr. Addis Longoria [facility]# [id number] Med[employer]    MITRAL VALVE REPLACEMENT  2018    Dr. Re David - 27mm Medtronic Cinch tissue valve    PAIN MANAGEMENT PROCEDURE N/A 2020 C6-C7 MIDLINE  EPIDURAL STEROID INJECTION WITH FLUOROSCOPY performed by Chelita Rao MD at [address] Bilateral 1/18/2021    BILATERAL T11 TRANSFORAMINAL EPIDURAL STEROID INJECTION WITH FLUOROSCOPY performed by Chelita Rao MD at [address] TRANSESOPHAGEAL ECHOCARDIOGRAM  08/21/2018    during CABG/MVR    TUNNELED VENOUS CATHETER PLACEMENT Left 08/23/2018    Dr. Leigh Oseguera for HD---since removed    UPPER GASTROINTESTINAL ENDOSCOPY N/A 10/10/2018    w/biopsy performed by Sergio Hernandez MD at [address]       Previous Medications    ACETAMINOPHEN PO    Take 500 mg by mouth every 6 hours as needed     ALBUTEROL (PROVENTIL) (2.5 MG/3ML) 0.083% NEBULIZER SOLUTION    Take 3 mLs by nebulization every 6 hours as needed for Wheezing    ALBUTEROL SULFATE  (90 BASE) MCG/ACT INHALER    USE 2 INHALATIONS EVERY 6 HOURS AS NEEDED FOR WHEEZING    AMIODARONE (CORDARONE) 200 MG TABLET    TAKE 1 TABLET DAILY    ASPIRIN 81 MG CHEWABLE TABLET    Take 1 tablet by mouth daily    AZITHROMYCIN (ZITHROMAX) 500 MG TABLET    Take 1 tablet by mouth daily for 5 days    BLOOD GLUCOSE MONITORING SUPPL (FREESTYLE LITE) GAETANO    1 Device by Does not apply route 4 times daily    BLOOD GLUCOSE TEST STRIPS (FREESTYLE LITE) STRIP    USE FOUR TIMES A DAY    CALCITRIOL (ROCALTROL) 0.25 MCG CAPSULE    Take 0.25 mcg by mouth daily     FREESTYLE LANCETS MISC    1 each by Does not apply route 4 times daily    INSULIN GLARGINE (LANTUS SOLOSTAR) 100 UNIT/ML INJECTION PEN    INJECT 20 UNITS IN THE MORNING    INSULIN LISPRO, 1 UNIT DIAL, (HUMALOG KWIKPEN) 100 UNIT/ML SOPN    5 units plusTID Insulin dosage per Sliding scale-140-199-2 units, 200-249- 3 units, 250-300-4 units, >300 take 5 units, max dose per day of 50 units    INSULIN PEN NEEDLE (BD PEN NEEDLE JANNET U/F) 32G X 4 MM MISC    USE 1 PEN NEEDLE FOUR TIMES A DAY    IPRATROPIUM-ALBUTEROL (DUONEB) 0.5-2.5 (3) Hypertension Mother     Heart Disease Mother     High Blood Pressure Mother           SOCIAL HISTORY       Social History     Tobacco Use    Smoking status: Never Smoker    Smokeless tobacco: Never Used   Vaping Use    Vaping Use: Never used   Substance Use Topics    Alcohol use: No    Drug use: No       SCREENINGS    [place] Coma Scale  Eye Opening: Spontaneous  Best Verbal Response: Oriented  Best Motor Response: Obeys commands  [place] Coma Scale Score: 15        PHYSICAL EXAM    (up to 7 for level 4, 8 or more for level 5)     ED Triage Vitals [05/17/22 1518]   BP Temp Temp Source Pulse Resp SpO2 Height Weight   108/60 98.2 °F (36.8 °C) Oral 74 16 95 % 5' 8\" (1.727 m) 196 lb (88.9 kg)       Physical Exam  Vitals and nursing note reviewed. Constitutional:       Appearance: She is well-developed. She is not diaphoretic. HENT:      Head: Normocephalic and atraumatic. Right Ear: External ear normal.      Left Ear: External ear normal.   Eyes:      General:         Right eye: No discharge. Left eye: No discharge. Neck:      Vascular: No JVD. Cardiovascular:      Rate and Rhythm: Normal rate. Pulses: Normal pulses. Heart sounds: Normal heart sounds. Pulmonary:      Effort: Pulmonary effort is normal. No respiratory distress. Breath sounds: Wheezing present. Abdominal:      Palpations: Abdomen is soft. Tenderness: There is no abdominal tenderness. Musculoskeletal:         General: Normal range of motion. Cervical back: Normal range of motion and neck supple. Skin:     General: Skin is warm and dry. Coloration: Skin is not pale. Neurological:      Mental Status: She is alert and oriented to person, place, and time.    Psychiatric:         Behavior: Behavior normal.         DIAGNOSTIC RESULTS   LABS:    Labs Reviewed   CBC WITH AUTO DIFFERENTIAL - Abnormal; Notable for the following components:       Result Value    RDW 17.2 (*)     All other components within normal limits   COMPREHENSIVE METABOLIC PANEL - Abnormal; Notable for the following components:    Sodium 134 (*)     Chloride 94 (*)     Glucose 175 (*)     BUN 61 (*)     CREATININE 2.6 (*)     GFR Non- 18 (*)     GFR African American 22 (*)     Alkaline Phosphatase 131 (*)     All other components within normal limits   TROPONIN - Abnormal; Notable for the following components:    Troponin 0.05 (*)     All other components within normal limits   PROTIME-INR - Abnormal; Notable for the following components:    Protime 44.9 (*)     INR 3.76 (*)     All other components within normal limits   COVID-19 & INFLUENZA COMBO   LACTATE, SEPSIS   BRAIN NATRIURETIC PEPTIDE   LACTATE, SEPSIS   PROCALCITONIN       When ordered only abnormal lab results are displayed. All other labs were within normal range or not returned as of this dictation. EKG: When ordered, EKG's are interpreted by the Emergency Department Physician in the absence of a cardiologist.  Please see their note for interpretation of EKG. RADIOLOGY:   Non-plain film images such as CT, Ultrasound and MRI are read by the radiologist. Plain radiographic images are visualized and preliminarily interpreted by the ED Provider with the below findings:        Interpretation per the Radiologist below, if available at the time of this note:    XR CHEST PORTABLE   Final Result   Clear lungs. No results found. PROCEDURES   Unless otherwise noted below, none     Procedures    CRITICAL CARE TIME   The total critical care time spent while evaluating and treating this patient was at least 34 minutes. This excludes time spent doing separately billable procedures. This includes time at the bedside, data interpretation, medication management, obtaining critical history from collateral sources if the patient is unable to provide it directly, and physician consultation.   Specifics of interventions taken and potentially life-threatening diagnostic considerations are listed above in the medical decision making. CONSULTS:  None      EMERGENCY DEPARTMENT COURSE and DIFFERENTIAL DIAGNOSIS/MDM:   Vitals:    Vitals:    05/17/22 1518   BP: 108/60   Pulse: 74   Resp: 16   Temp: 98.2 °F (36.8 °C)   TempSrc: Oral   SpO2: 95%   Weight: 196 lb (88.9 kg)   Height: 5' 8\" (1.727 m)       Patient was given the following medications:  Medications   methylPREDNISolone sodium (SOLU-MEDROL) injection 125 mg (125 mg IntraVENous Given 5/17/22 1641)   albuterol (PROVENTIL) nebulizer solution 2.5 mg (2.5 mg Nebulization Given 5/17/22 1651)   ipratropium-albuterol (DUONEB) nebulizer solution 1 ampule (1 ampule Inhalation Given 5/17/22 1650)   aspirin chewable tablet 324 mg (324 mg Oral Given 5/17/22 1646)   HYDROcodone-acetaminophen (NORCO) 5-325 MG per tablet 1 tablet (1 tablet Oral Given 5/17/22 1639)         Is this patient to be included in the SEP-1 Core Measure due to severe sepsis or septic shock? No   Exclusion criteria - the patient is NOT to be included for SEP-1 Core Measure due to: Infection is not suspected    Briefly, this is a 77year old female with history of congestive heart failure, coronary artery disease, goiter, pulmonary embolism, hypercoagulable state, hypertension, hypotension, ICD, ischemic cardiomyopathy, obesity, paroxysmal atrial fibrillation, kidney disease, type 2 diabetes that presents to the emergency department with complaint of chest tightness with wheezing and cough. Onset of symptoms about 8 days ago. States that she has taken 2 antibiotics without significant relief and was seen by primary care just prior to arrival, sent to the emergency department for evaluation and treatment. Patient given DuoNeb and 2 albuterol treatments as well as 125 mg of Solu-Medrol. Wheezing improved. COVID and flu are negative. CBC is unremarkable.   CMP does show an TORIBIO with a creatinine of 2.6, higher than patient's baseline with a BUN of 61, the patient does appear dehydrated. Glucose 175. Troponin 0.05. Lactate 1.3. INR 3.7. XR CHEST PORTABLE (Final result)  Result time 05/17/22 15:47:42  Final result by Nicolas Soto MD (05/17/22 15:47:42)                Impression:    Clear lungs. Patient will be admitted for wheezing, elevated troponin, TORIBIO, and chest tightness. FINAL IMPRESSION      1. Wheezing    2. Elevated troponin    3. TORIBIO (acute kidney injury) ([facility] [address]    4. Chest tightness          DISPOSITION/PLAN   DISPOSITION Decision To Admit 05/17/2022 04:45:15 PM      PATIENT REFERRED TO:  No follow-up provider specified.     DISCHARGE MEDICATIONS:  New Prescriptions    No medications on file       DISCONTINUED MEDICATIONS:  Discontinued Medications    No medications on file              (Please note that portions of this note were completed with a voice recognition program.  Efforts were made to edit the dictations but occasionally words are mis-transcribed.)    SOLEDAD Mckeon CNP (electronically signed)           SOLEDAD Mckeon CNP  05/17/22 9100

## 2022-05-17 NOTE — PROGRESS NOTES
Pt. To room 3304 from ED via wheelchair. Pt. VSS, patient denies pain, patient oriented to room. Pt. Updated on POC, denies questions. Pt. Given toiletries, denies further needs. Bed in lowest position, bed alarm activated, call light and bedside table within reach.

## 2022-05-18 LAB
ANION GAP SERPL CALCULATED.3IONS-SCNC: 14 MMOL/L (ref 3–16)
BASOPHILS ABSOLUTE: 0 K/UL (ref 0–0.2)
BASOPHILS RELATIVE PERCENT: 0.5 %
BUN BLDV-MCNC: 71 MG/DL (ref 7–20)
CALCIUM SERPL-MCNC: 8.9 MG/DL (ref 8.3–10.6)
CHLORIDE BLD-SCNC: 94 MMOL/L (ref 99–110)
CO2: 24 MMOL/L (ref 21–32)
CREAT SERPL-MCNC: 2.7 MG/DL (ref 0.6–1.2)
EKG ATRIAL RATE: 91 BPM
EKG DIAGNOSIS: NORMAL
EKG P AXIS: 37 DEGREES
EKG P-R INTERVAL: 280 MS
EKG Q-T INTERVAL: 414 MS
EKG QRS DURATION: 90 MS
EKG QTC CALCULATION (BAZETT): 483 MS
EKG R AXIS: 38 DEGREES
EKG T AXIS: 98 DEGREES
EKG VENTRICULAR RATE: 82 BPM
EOSINOPHILS ABSOLUTE: 0 K/UL (ref 0–0.6)
EOSINOPHILS RELATIVE PERCENT: 0 %
ESTIMATED AVERAGE GLUCOSE: 142.7 MG/DL
GFR AFRICAN AMERICAN: 21
GFR NON-AFRICAN AMERICAN: 17
GLUCOSE BLD-MCNC: 229 MG/DL (ref 70–99)
GLUCOSE BLD-MCNC: 345 MG/DL (ref 70–99)
GLUCOSE BLD-MCNC: 347 MG/DL (ref 70–99)
GLUCOSE BLD-MCNC: 433 MG/DL (ref 70–99)
GLUCOSE BLD-MCNC: 440 MG/DL (ref 70–99)
GLUCOSE BLD-MCNC: 441 MG/DL (ref 70–99)
HBA1C MFR BLD: 6.6 %
HCT VFR BLD CALC: 37.8 % (ref 36–48)
HEMOGLOBIN: 12.4 G/DL (ref 12–16)
INR BLD: 2.89 (ref 0.88–1.12)
LYMPHOCYTES ABSOLUTE: 0.4 K/UL (ref 1–5.1)
LYMPHOCYTES RELATIVE PERCENT: 7.7 %
MCH RBC QN AUTO: 29.2 PG (ref 26–34)
MCHC RBC AUTO-ENTMCNC: 32.8 G/DL (ref 31–36)
MCV RBC AUTO: 89 FL (ref 80–100)
MONOCYTES ABSOLUTE: 0 K/UL (ref 0–1.3)
MONOCYTES RELATIVE PERCENT: 0.8 %
NEUTROPHILS ABSOLUTE: 4.4 K/UL (ref 1.7–7.7)
NEUTROPHILS RELATIVE PERCENT: 91 %
PDW BLD-RTO: 17.6 % (ref 12.4–15.4)
PERFORMED ON: ABNORMAL
PLATELET # BLD: 227 K/UL (ref 135–450)
PMV BLD AUTO: 8.9 FL (ref 5–10.5)
POTASSIUM REFLEX MAGNESIUM: 4 MMOL/L (ref 3.5–5.1)
PROTHROMBIN TIME: 34.1 SEC (ref 9.9–12.7)
RBC # BLD: 4.25 M/UL (ref 4–5.2)
SODIUM BLD-SCNC: 132 MMOL/L (ref 136–145)
WBC # BLD: 4.8 K/UL (ref 4–11)

## 2022-05-18 PROCEDURE — 93010 ELECTROCARDIOGRAM REPORT: CPT | Performed by: INTERNAL MEDICINE

## 2022-05-18 PROCEDURE — 1200000000 HC SEMI PRIVATE

## 2022-05-18 PROCEDURE — 94761 N-INVAS EAR/PLS OXIMETRY MLT: CPT

## 2022-05-18 PROCEDURE — 84166 PROTEIN E-PHORESIS/URINE/CSF: CPT

## 2022-05-18 PROCEDURE — 84156 ASSAY OF PROTEIN URINE: CPT

## 2022-05-18 PROCEDURE — 94640 AIRWAY INHALATION TREATMENT: CPT

## 2022-05-18 PROCEDURE — 6370000000 HC RX 637 (ALT 250 FOR IP): Performed by: NURSE PRACTITIONER

## 2022-05-18 PROCEDURE — 85610 PROTHROMBIN TIME: CPT

## 2022-05-18 PROCEDURE — 6360000002 HC RX W HCPCS: Performed by: HOSPITALIST

## 2022-05-18 PROCEDURE — 80048 BASIC METABOLIC PNL TOTAL CA: CPT

## 2022-05-18 PROCEDURE — 99223 1ST HOSP IP/OBS HIGH 75: CPT | Performed by: INTERNAL MEDICINE

## 2022-05-18 PROCEDURE — 6370000000 HC RX 637 (ALT 250 FOR IP): Performed by: HOSPITALIST

## 2022-05-18 PROCEDURE — 36415 COLL VENOUS BLD VENIPUNCTURE: CPT

## 2022-05-18 PROCEDURE — 82570 ASSAY OF URINE CREATININE: CPT

## 2022-05-18 PROCEDURE — 84300 ASSAY OF URINE SODIUM: CPT

## 2022-05-18 PROCEDURE — 2580000003 HC RX 258: Performed by: HOSPITALIST

## 2022-05-18 PROCEDURE — 85025 COMPLETE CBC W/AUTO DIFF WBC: CPT

## 2022-05-18 PROCEDURE — 2500000003 HC RX 250 WO HCPCS: Performed by: HOSPITALIST

## 2022-05-18 RX ORDER — INSULIN LISPRO 100 [IU]/ML
0-9 INJECTION, SOLUTION INTRAVENOUS; SUBCUTANEOUS NIGHTLY
Status: DISCONTINUED | OUTPATIENT
Start: 2022-05-18 | End: 2022-05-18

## 2022-05-18 RX ORDER — WARFARIN SODIUM 5 MG/1
5 TABLET ORAL
Status: COMPLETED | OUTPATIENT
Start: 2022-05-18 | End: 2022-05-18

## 2022-05-18 RX ORDER — INSULIN LISPRO 100 [IU]/ML
0-9 INJECTION, SOLUTION INTRAVENOUS; SUBCUTANEOUS NIGHTLY
Status: DISCONTINUED | OUTPATIENT
Start: 2022-05-18 | End: 2022-05-22 | Stop reason: DRUGHIGH

## 2022-05-18 RX ORDER — INSULIN GLARGINE 100 [IU]/ML
22 INJECTION, SOLUTION SUBCUTANEOUS DAILY
Status: DISCONTINUED | OUTPATIENT
Start: 2022-05-18 | End: 2022-05-22

## 2022-05-18 RX ORDER — INSULIN LISPRO 100 [IU]/ML
5 INJECTION, SOLUTION INTRAVENOUS; SUBCUTANEOUS
Status: DISCONTINUED | OUTPATIENT
Start: 2022-05-18 | End: 2022-05-22

## 2022-05-18 RX ORDER — INSULIN LISPRO 100 [IU]/ML
0-18 INJECTION, SOLUTION INTRAVENOUS; SUBCUTANEOUS
Status: DISCONTINUED | OUTPATIENT
Start: 2022-05-18 | End: 2022-05-22

## 2022-05-18 RX ADMIN — WARFARIN SODIUM 5 MG: 5 TABLET ORAL at 17:44

## 2022-05-18 RX ADMIN — DOXYCYCLINE 100 MG: 100 INJECTION, POWDER, LYOPHILIZED, FOR SOLUTION INTRAVENOUS at 22:45

## 2022-05-18 RX ADMIN — Medication 10 ML: at 22:34

## 2022-05-18 RX ADMIN — DOXYCYCLINE 100 MG: 100 INJECTION, POWDER, LYOPHILIZED, FOR SOLUTION INTRAVENOUS at 12:35

## 2022-05-18 RX ADMIN — SODIUM CHLORIDE 25 ML: 9 INJECTION, SOLUTION INTRAVENOUS at 22:42

## 2022-05-18 RX ADMIN — OXYCODONE 5 MG: 5 TABLET ORAL at 00:02

## 2022-05-18 RX ADMIN — INSULIN LISPRO 5 UNITS: 100 INJECTION, SOLUTION INTRAVENOUS; SUBCUTANEOUS at 17:46

## 2022-05-18 RX ADMIN — Medication 10 ML: at 09:37

## 2022-05-18 RX ADMIN — OXYCODONE 5 MG: 5 TABLET ORAL at 12:27

## 2022-05-18 RX ADMIN — OXYCODONE 5 MG: 5 TABLET ORAL at 06:34

## 2022-05-18 RX ADMIN — INSULIN LISPRO 5 UNITS: 100 INJECTION, SOLUTION INTRAVENOUS; SUBCUTANEOUS at 14:20

## 2022-05-18 RX ADMIN — IPRATROPIUM BROMIDE AND ALBUTEROL SULFATE 1 AMPULE: 2.5; .5 SOLUTION RESPIRATORY (INHALATION) at 12:48

## 2022-05-18 RX ADMIN — METHYLPREDNISOLONE SODIUM SUCCINATE 40 MG: 40 INJECTION, POWDER, FOR SOLUTION INTRAMUSCULAR; INTRAVENOUS at 17:44

## 2022-05-18 RX ADMIN — METOPROLOL SUCCINATE 50 MG: 50 TABLET, EXTENDED RELEASE ORAL at 12:28

## 2022-05-18 RX ADMIN — INSULIN GLARGINE 22 UNITS: 100 INJECTION, SOLUTION SUBCUTANEOUS at 09:29

## 2022-05-18 RX ADMIN — ASPIRIN 81 MG 81 MG: 81 TABLET ORAL at 12:28

## 2022-05-18 RX ADMIN — IPRATROPIUM BROMIDE AND ALBUTEROL SULFATE 1 AMPULE: 2.5; .5 SOLUTION RESPIRATORY (INHALATION) at 08:33

## 2022-05-18 RX ADMIN — LEVOTHYROXINE SODIUM 100 MCG: 0.1 TABLET ORAL at 12:28

## 2022-05-18 RX ADMIN — SODIUM CHLORIDE: 9 INJECTION, SOLUTION INTRAVENOUS at 12:34

## 2022-05-18 RX ADMIN — INSULIN LISPRO 15 UNITS: 100 INJECTION, SOLUTION INTRAVENOUS; SUBCUTANEOUS at 17:46

## 2022-05-18 RX ADMIN — INSULIN LISPRO 9 UNITS: 100 INJECTION, SOLUTION INTRAVENOUS; SUBCUTANEOUS at 03:27

## 2022-05-18 RX ADMIN — MONTELUKAST SODIUM 10 MG: 10 TABLET, FILM COATED ORAL at 22:34

## 2022-05-18 RX ADMIN — PANTOPRAZOLE SODIUM 40 MG: 40 TABLET, DELAYED RELEASE ORAL at 06:34

## 2022-05-18 RX ADMIN — IPRATROPIUM BROMIDE AND ALBUTEROL SULFATE 1 AMPULE: 2.5; .5 SOLUTION RESPIRATORY (INHALATION) at 16:28

## 2022-05-18 RX ADMIN — CALCITRIOL 0.25 MCG: 0.25 CAPSULE ORAL at 12:56

## 2022-05-18 RX ADMIN — IPRATROPIUM BROMIDE AND ALBUTEROL SULFATE 1 AMPULE: 2.5; .5 SOLUTION RESPIRATORY (INHALATION) at 20:06

## 2022-05-18 RX ADMIN — METHYLPREDNISOLONE SODIUM SUCCINATE 40 MG: 40 INJECTION, POWDER, FOR SOLUTION INTRAMUSCULAR; INTRAVENOUS at 03:27

## 2022-05-18 RX ADMIN — AMIODARONE HYDROCHLORIDE 100 MG: 200 TABLET ORAL at 12:28

## 2022-05-18 RX ADMIN — INSULIN LISPRO 12 UNITS: 100 INJECTION, SOLUTION INTRAVENOUS; SUBCUTANEOUS at 09:29

## 2022-05-18 RX ADMIN — Medication 400 MG: at 12:28

## 2022-05-18 RX ADMIN — INSULIN LISPRO 18 UNITS: 100 INJECTION, SOLUTION INTRAVENOUS; SUBCUTANEOUS at 12:57

## 2022-05-18 ASSESSMENT — PAIN SCALES - GENERAL
PAINLEVEL_OUTOF10: 4
PAINLEVEL_OUTOF10: 4
PAINLEVEL_OUTOF10: 3
PAINLEVEL_OUTOF10: 6
PAINLEVEL_OUTOF10: 0
PAINLEVEL_OUTOF10: 0

## 2022-05-18 ASSESSMENT — PAIN DESCRIPTION - LOCATION
LOCATION: BACK
LOCATION: BACK

## 2022-05-18 ASSESSMENT — PAIN DESCRIPTION - PAIN TYPE
TYPE: CHRONIC PAIN
TYPE: CHRONIC PAIN

## 2022-05-18 NOTE — PROGRESS NOTES
[facility]    5/18/2022 3:05 PM        Name: Lucio Freeman . Admitted: 5/17/2022  Primary Care Provider: Caryle Husky, MD (Tel: [phone])                        Subjective:  . No acute events overnight. Resting well. Pain control. Diet ok. Labs reviewed  Denies any chest pain sob.      Reviewed interval ancillary notes    Current Medications  insulin lispro (HUMALOG) injection vial 0-18 Units, TID WC  insulin lispro (HUMALOG) injection vial 0-9 Units, Nightly  warfarin (COUMADIN) tablet 5 mg, Once  insulin glargine (LANTUS) injection vial 22 Units, Daily  insulin lispro (HUMALOG) injection vial 5 Units, TID WC  albuterol sulfate  (90 Base) MCG/ACT inhaler 1 puff, Q6H PRN  amiodarone (CORDARONE) tablet 100 mg, Daily  aspirin chewable tablet 81 mg, Daily  calcitRIOL (ROCALTROL) capsule 0.25 mcg, Daily  ipratropium-albuterol (DUONEB) nebulizer solution 3 mL, Q4H PRN  levothyroxine (SYNTHROID) tablet 100 mcg, Daily  magnesium oxide (MAG-OX) tablet 400 mg, Daily  metoprolol succinate (TOPROL XL) extended release tablet 50 mg, Daily  montelukast (SINGULAIR) tablet 10 mg, Nightly  pantoprazole (PROTONIX) tablet 40 mg, QAM AC  dextrose bolus 10% 125 mL, PRN   Or  dextrose bolus 10% 250 mL, PRN  glucagon (rDNA) injection 1 mg, PRN  dextrose 5 % solution, PRN  sodium chloride flush 0.9 % injection 5-40 mL, 2 times per day  sodium chloride flush 0.9 % injection 5-40 mL, PRN  0.9 % sodium chloride infusion, PRN  ondansetron (ZOFRAN-ODT) disintegrating tablet 4 mg, Q8H PRN   Or  ondansetron (ZOFRAN) injection 4 mg, Q6H PRN  polyethylene glycol (GLYCOLAX) packet 17 g, Daily PRN  acetaminophen (TYLENOL) tablet 650 mg, Q6H PRN   Or  acetaminophen (TYLENOL) suppository 650 mg, Q6H PRN  methylPREDNISolone sodium (SOLU-MEDROL) injection 40 mg, Q12H  ipratropium-albuterol (DUONEB) nebulizer solution 1 ampule, Q4H WA  doxycycline (VIBRAMYCIN) 100 mg in dextrose 5 % 100 mL IVPB, Q12H  guaiFENesin-dextromethorphan (ROBITUSSIN DM) 100-10 MG/5ML syrup 5 mL, Q4H PRN  haloperidol lactate (HALDOL) injection 2 mg, Once  diphenhydrAMINE (BENADRYL) injection 25 mg, Once  warfarin placeholder: dosing by pharmacy, RX Placeholder  oxyCODONE (ROXICODONE) immediate release tablet 5 mg, Q4H PRN        Objective:  /79   Pulse 87   Temp 97.6 °F (36.4 °C) (Oral)   Resp 16   Ht 5' 8\" (1.727 m)   Wt 198 lb 14.4 oz (90.2 kg)   SpO2 94%   BMI 30.24 kg/m²   No intake or output data in the 24 hours ending 05/18/22 1505   Wt Readings from Last 3 Encounters:   05/18/22 198 lb 14.4 oz (90.2 kg)   05/06/22 206 lb (93.4 kg)   05/06/22 204 lb 4 oz (92.6 kg)       General appearance:  Appears comfortable  Eyes: Sclera clear. Pupils equal.  ENT: Moist oral mucosa. Trachea midline, no adenopathy. Cardiovascular: Regular rhythm, normal S1, S2. No murmur. No edema in lower extremities  Respiratory: Not using accessory muscles. Good inspiratory effort. Clear to auscultation bilaterally, no wheeze or crackles. GI: Abdomen soft, no tenderness, not distended, normal bowel sounds  Musculoskeletal: No cyanosis in digits, neck supple  Neurology: CN 2-12 grossly intact. No speech or motor deficits  Psych: Normal affect.  Alert and oriented in time, place and person  Skin: Warm, dry, normal turgor    Labs and Tests:  CBC:   Recent Labs     05/17/22  1601 05/18/22  0423   WBC 9.1 4.8   HGB 13.2 12.4    227     BMP:    Recent Labs     05/17/22  1601 05/18/22  0423   * 132*   K 4.1 4.0   CL 94* 94*   CO2 27 24   BUN 61* 71*   CREATININE 2.6* 2.7*   GLUCOSE 175* 441*     Hepatic:   Recent Labs     05/17/22  1601   AST 22   ALT 18   BILITOT 0.8   ALKPHOS 131*       Discussed care with patient             Problem List  Principal Problem:    TORIBIO (acute kidney injury) [facility])  Resolved Problems:    * No resolved hospital problems. *       Assessment & Plan:   1. Shortness of breath  -Likely multifactorial in setting of possible pneumonia with failed outpatient treatment  -We will continue IV doxycycline for now monitor closely      Acute on chronic kidney disease  -Creatinine is 2.7.  -We will monitor closely follows nephrology will be consulted she does have stage IV kidney disease as well  -We will hold diuretics for now    CHF looks euvolemic    Uncontrolled diabetes  -Blood sugars are at 441 did not get morning Lantus will resume at sliding scale May increase add scheduled prandial dose as well      Atrial fibrillation rate is controlled      Diet: ADULT DIET;  Regular; 5 carb choices (75 gm/meal)  Code:Full Code  DVT PPX lovenox       Danny Frausto MD   5/18/2022 3:05 PM

## 2022-05-18 NOTE — PLAN OF CARE
Problem: Discharge Planning  Goal: Discharge to home or other facility with appropriate resources  5/18/2022 1108 by Vasu Suggs RN  Outcome: Progressing  Flowsheets (Taken 5/17/2022 1904 by Vasu Suggs RN)  Discharge to home or other facility with appropriate resources:   Identify barriers to discharge with patient and caregiver   Arrange for interpreters to assist at discharge as needed     Problem: Pain  Goal: Verbalizes/displays adequate comfort level or baseline comfort level  5/18/2022 1108 by Vasu Suggs RN  Outcome: Progressing  Flowsheets (Taken 5/18/2022 1108)  Verbalizes/displays adequate comfort level or baseline comfort level:   Encourage patient to monitor pain and request assistance   Assess pain using appropriate pain scale   Administer analgesics based on type and severity of pain and evaluate response  Note: Pt reported on a scale from 0-10, her pain is at a 4/10 in her back, pt states that this is tolerable for her. Pt states she will wait for her next dose of oxycodone at 1030. Pt states that this pain is chronic and has been going on for awhile. 5/18/2022 0217 by Tere Perez RN  Outcome: Progressing     Problem: Skin/Tissue Integrity  Goal: Absence of new skin breakdown  Description: 1. Monitor for areas of redness and/or skin breakdown  2. Assess vascular access sites hourly  3. Every 4-6 hours minimum:  Change oxygen saturation probe site  4. Every 4-6 hours:  If on nasal continuous positive airway pressure, respiratory therapy assess nares and determine need for appliance change or resting period.   5/18/2022 1108 by Vasu Suggs RN  Outcome: Progressing     Problem: Safety - Adult  Goal: Free from fall injury  5/18/2022 1108 by Vasu Suggs RN  Outcome: Progressing     Problem: ABCDS Injury Assessment  Goal: Absence of physical injury  5/18/2022 1108 by Vasu Suggs RN  Outcome: Progressing

## 2022-05-18 NOTE — CONSULTS
[facility]   Nephrology progress Note  [phone]   [facility]. "[employer]           Reason for Consult: TORIBIO /CKD    HISTORY OF PRESENT ILLNESS:      The patient is a 77 y.o. female with significant past medical history of atrial fibrillation, asthma for eosinophilia, hyperlipidemia, hypertension, gastritis, type 2 diabetes mellitus, presented to the ER with progressive shortness of breath over the last week. She been diagnosed as having acute bronchitis and has received antibiotics. However she kept feeling worse and while in the PCPs office she had persistent cough and shortness of breath and was sent to the ER. She also describes sharp chest pain, there is no hemoptysis no productive sputum  She was found to have acute on chronic kidney disease with chronic atrial fibrillation and possible underlying pneumonia  Her serum creatinine was 2.6 with bun of 61.   Has early chronic kidney disease stage IV with a EGFR of 29 and a creatinine of 1.7      Denies any problem with urination  She thinks 2 of the plugs she coughed up might of been streaked with blood        Past Medical History:        Diagnosis Date    Asthma     Atrial fibrillation (HCC)     Eosinophilia     Hemoptysis     HIGH CHOLESTEROL     Hx of blood clots     Hypertension     Irregular heart beat     Other specified gastritis without mention of hemorrhage     Palpitations     Skin cancer     basal and squamous    Type II or unspecified type diabetes mellitus without mention of complication, not stated as uncontrolled        Past Surgical History:        Procedure Laterality Date    BRONCHOSCOPY  2016    Dr. Brennan Carbone - brushings from [address] [place] [address],[place] [address]  2018    Dr. Mya Bell and 5/3 2021    Cardiac cath, cardioversion and rafat     SECTION      CHOLECYSTECTOMY      COLONOSCOPY  2017    Dr. Gianna Rodriguez - sigmoid diverticulosis, polypectomies x3    COLONOSCOPY  01/17/2014    Dr. Lotus George - sigmoid diverticulosis, polypectomies x3, internal hemorrhoids    COLONOSCOPY  10/10/2018    w/biopsy performed by Ashley Duncan MD at [facility] COLONOSCOPY N/A 8/7/2020    COLONOSCOPY DIAGNOSTIC performed by Rosario Tesfaye MD at [address]  08/21/2018    Dr. Rae Jerome - x3 (LIMA-LAD, L SV-D1-PLV) modified BL MAZE procedure w/obliteration of WAYNE using 45mm AtriClip    INSERTABLE CARDIAC MONITOR Left 08/16/2018    Dr. Linwood Perez - Louie Dryer SN# [id number] Med[employer]    MITRAL VALVE REPLACEMENT  08/21/2018    Dr. Rae Jerome - 27mm Medtronic Cinch tissue valve    PAIN MANAGEMENT PROCEDURE N/A 12/18/2020    C6-C7 MIDLINE  EPIDURAL STEROID INJECTION WITH FLUOROSCOPY performed by Rose Smart MD at [address] Bilateral 1/18/2021    BILATERAL T11 TRANSFORAMINAL EPIDURAL STEROID INJECTION WITH FLUOROSCOPY performed by Rose Smart MD at [address] TRANSESOPHAGEAL ECHOCARDIOGRAM  08/21/2018    during CABG/MVR    TUNNELED VENOUS CATHETER PLACEMENT Left 08/23/2018    Dr. Aruna Gleason - IJ for HD---since removed    UPPER GASTROINTESTINAL ENDOSCOPY N/A 10/10/2018    w/biopsy performed by Ashley Duncan MD at [facility]       Current Medications:    No current facility-administered medications on file prior to encounter.      Current Outpatient Medications on File Prior to Encounter   Medication Sig Dispense Refill    azithromycin (ZITHROMAX) 500 MG tablet Take 1 tablet by mouth daily for 5 days 5 tablet 0    calcitRIOL (ROCALTROL) 0.25 MCG capsule Take 0.25 mcg by mouth daily       insulin lispro, 1 Unit Dial, (HUMALOG KWIKPEN) 100 UNIT/ML SOPN 5 units plusTID Insulin dosage per Sliding scale-140-199-2 units, 200-249- 3 units, 250-300-4 units, >300 take 5 units, max dose per day of 50 units 15 mL 2    predniSONE (DELTASONE) 10 MG tablet TAKE 1 TABLET AS NEEDED (EOSINOPHILIC GASATRITIS) (Patient taking differently: as needed TAKE 1 TABLET AS NEEDED (EOSINOPHILIC GASATRITIS)) 287 tablet 0    metOLazone (ZAROXOLYN) 2.5 MG tablet TAKE 1 TABLET ON TUESDAY AND FRIDAY AND AS DIRECTED 30 tablet 1    spironolactone (ALDACTONE) 25 MG tablet TAKE 1 TABLET TWICE A  tablet 1    metoprolol succinate (TOPROL XL) 50 MG extended release tablet TAKE 1 TABLET DAILY 90 tablet 0    vitamin D (ERGOCALCIFEROL) 1.25 MG (05174 UT) CAPS capsule TAKE ONE CAPSULE BY MOUTH ONCE WEEKLY 12 capsule 1    amiodarone (CORDARONE) 200 MG tablet TAKE 1 TABLET DAILY (Patient taking differently: Take 100 mg by mouth daily ) 60 tablet 5    torsemide (DEMADEX) 20 MG tablet TAKE 2 TABLETS TWICE A  tablet 1    nystatin (MYCOSTATIN) 330723 UNIT/ML suspension TAKE ONE TEASPOONFUL BY MOUTH FOUR TIMES A DAY FOR 5 DAYS (Patient not taking: Reported on 5/17/2022) 120 mL 1    levothyroxine (SYNTHROID) 100 MCG tablet Take 1 tablet by mouth daily 90 tablet 1    blood glucose test strips (FREESTYLE LITE) strip USE FOUR TIMES A  strip 3    insulin glargine (LANTUS SOLOSTAR) 100 UNIT/ML injection pen INJECT 20 UNITS IN THE MORNING (Patient taking differently: 22 Units INJECT 20 UNITS IN THE MORNING) 90 mL 1    albuterol sulfate  (90 Base) MCG/ACT inhaler USE 2 INHALATIONS EVERY 6 HOURS AS NEEDED FOR WHEEZING 25.5 g 2    ipratropium-albuterol (DUONEB) 0.5-2.5 (3) MG/3ML SOLN nebulizer solution Inhale 3 mLs into the lungs every 4 hours as needed for Shortness of Breath 120 each 0    midodrine (PROAMATINE) 2.5 MG tablet TAKE ONE TABLET BY MOUTH TWICE A DAY (Patient taking differently: as needed ) 60 tablet 5    FreeStyle Lancets MISC 1 each by Does not apply route 4 times daily 200 each 5    Blood Glucose Monitoring Suppl (FREESTYLE LITE) GAETANO 1 Device by Does not apply route 4 times daily      oxyCODONE (ROXICODONE) 5 MG immediate release tablet Take 0.5 mg by mouth daily.       montelukast (SINGULAIR) 10 MG tablet TAKE 1 TABLET NIGHTLY 90 tablet 3    potassium chloride (KLOR-CON M) 10 MEQ extended release tablet TAKE 1 TABLET DAILY 90 tablet 3    warfarin (COUMADIN) 5 MG tablet TAKE 1 TABLET DAILY (Patient taking differently: Review INR prior to administration.   Managed by Dr. Crissy Sethi) 100 tablet 3    Insulin Pen Needle (BD PEN NEEDLE JANNET U/F) 32G X 4 MM MISC USE 1 PEN NEEDLE FOUR TIMES A  each 3    magnesium oxide (MAG-OX) 400 MG tablet Take 1 tablet by mouth daily 90 tablet 3    ACETAMINOPHEN PO Take 500 mg by mouth every 6 hours as needed       albuterol (PROVENTIL) (2.5 MG/3ML) 0.083% nebulizer solution Take 3 mLs by nebulization every 6 hours as needed for Wheezing 120 each 3    polyethylene glycol (GLYCOLAX) 17 GM/SCOOP powder Take 17 g by mouth daily       omeprazole (PRILOSEC) 20 MG delayed release capsule Take 20 mg by mouth daily      ondansetron (ZOFRAN) 4 MG tablet Take 1 tablet by mouth 3 times daily as needed for Nausea or Vomiting 30 tablet 0    aspirin 81 MG chewable tablet Take 1 tablet by mouth daily 30 tablet 3    vitamin B-12 500 MCG tablet Take 1 tablet by mouth daily 30 tablet 3       Allergies:  Hzagnotq-firbhgx-umestg [fluocinolone], Ciprofloxacin, Diovan [valsartan], Flagyl [metronidazole], Metformin and related [metformin and related], Benazepril, Morphine, Saxagliptin, and Levaquin [levofloxacin]    Social History:    Social History     Socioeconomic History    Marital status:      Spouse name: Not on file    Number of children: Not on file    Years of education: Not on file    Highest education level: Not on file   Occupational History    Not on file   Tobacco Use    Smoking status: Never Smoker    Smokeless tobacco: Never Used   Vaping Use    Vaping Use: Never used   Substance and Sexual Activity    Alcohol use: No    Drug use: No    Sexual activity: Not on file   Other Topics Concern    Not on file   Social History Narrative    Not on file     Social Determinants of Health     Financial Resource Strain: Low Risk     Difficulty of Paying Living Expenses: Not hard at all   Food Insecurity: No Food Insecurity    Worried About Running Out of Food in the Last Year: Never true    [address] in the Last Year: Never true   Transportation Needs:     Lack of Transportation (Medical): Not on file    Lack of Transportation (Non-Medical): Not on file   Physical Activity:     Days of Exercise per Week: Not on file    Minutes of Exercise per Session: Not on file   Stress:     Feeling of Stress : Not on file   Social Connections:     Frequency of Communication with Friends and Family: Not on file    Frequency of Social Gatherings with Friends and Family: Not on file    Attends Restorationism Services: Not on file    Active Member of [address] [place] [address] [place] or Organizations: Not on file    Attends Club or Organization Meetings: Not on file    Marital Status: Not on file   Intimate Partner Violence:     Fear of Current or Ex-Partner: Not on file    Emotionally Abused: Not on file    Physically Abused: Not on file    Sexually Abused: Not on file   Housing Stability:     Unable to Pay for Housing in the Last Year: Not on file    Number of [place] in the Last Year: Not on file    Unstable Housing in the Last Year: Not on file       Family History:       Problem Relation Age of Onset    Cancer Father     Asthma Mother     Hypertension Mother     Heart Disease Mother     High Blood Pressure Mother        REVIEW OF SYSTEMS:        10 pt ROS done, relevant features as in [place], rest negative       PHYSICAL EXAM:    Vitals:    /73   Pulse 93   Temp 97.4 °F (36.3 °C) (Oral)   Resp 16   Ht 5' 8\" (1.727 m)   Wt 198 lb 14.4 oz (90.2 kg)   SpO2 90%   BMI 30.24 kg/m²   No intake/output data recorded. No intake/output data recorded.     Physical Exam:  Gen:  alert, oriented x 3  PERRL , EOM +  Pallor +, No icterus  JVP not raised CV: IRRR murmur +, No rub . Lungs:B/ L air entry, Normal breath sounds   Abd: soft, bowel sounds + , No organomegaly   Ext: No edema, no cyanosis  Skin: Warm. No rash    Neuro: nonfocal.      DATA:    CBC with Differential:    Lab Results   Component Value Date    WBC 4.8 05/18/2022    RBC 4.25 05/18/2022    HGB 12.4 05/18/2022    HCT 37.8 05/18/2022     05/18/2022    MCV 89.0 05/18/2022    MCH 29.2 05/18/2022    MCHC 32.8 05/18/2022    RDW 17.6 05/18/2022    SEGSPCT 64.1 09/02/2020    BANDSPCT 1 01/14/2019    LYMPHOPCT 7.7 05/18/2022    MONOPCT 0.8 05/18/2022    BASOPCT 0.5 05/18/2022    MONOSABS 0.0 05/18/2022    LYMPHSABS 0.4 05/18/2022    EOSABS 0.0 05/18/2022    BASOSABS 0.0 05/18/2022     CMP:    Lab Results   Component Value Date     05/18/2022    K 4.0 05/18/2022    CL 94 05/18/2022    CO2 24 05/18/2022    BUN 71 05/18/2022    CREATININE 2.7 05/18/2022    GFRAA 21 05/18/2022    AGRATIO 1.1 05/17/2022    LABGLOM 17 05/18/2022    LABGLOM 45 12/06/2018    GLUCOSE 441 05/18/2022    PROT 7.2 05/17/2022    LABALBU 3.8 05/17/2022    CALCIUM 8.9 05/18/2022    BILITOT 0.8 05/17/2022    ALKPHOS 131 05/17/2022    AST 22 05/17/2022    ALT 18 05/17/2022     Magnesium:    Lab Results   Component Value Date    MG 2.30 08/13/2021     Phosphorus:    Lab Results   Component Value Date    PHOS 4.0 04/28/2022     Uric Acid:  No results found for: LABURIC, URICACID  U/A:    Lab Results   Component Value Date    COLORU Yellow 04/28/2022    PROTEINU Negative 04/28/2022    PHUR 7.0 04/28/2022    WBCUA 3 04/15/2021    RBCUA 2 04/15/2021    YEAST neg 02/12/2021    BACTERIA trace 02/12/2021    BACTERIA 1+ 05/11/2020    CLARITYU Clear 04/28/2022    SPECGRAV 1.009 04/28/2022    LEUKOCYTESUR Negative 04/28/2022    UROBILINOGEN 1.0 04/28/2022    BILIRUBINUR Negative 04/28/2022    BLOODU Negative 04/28/2022    GLUCOSEU Negative 04/28/2022           IMPRESSION/RECOMMENDATIONS:      1.   TORIBIO: Possibly prerenal in nature versus ATN. Check fractional excretion sodium  2. CKD stage IV with a EGFR of 29 follows up with me in office has been investigated  3. CHF: Currently euvolemic  4. Pneumonia on antibiotics  5. Atrial fibrillation which is rate controlled    Thank you for allowing me to participate in the care of this patient. I will continue to follow along. Please call with questions.     Edwina Maharaj MD, MD  5/18/2022  [facility]

## 2022-05-18 NOTE — PROGRESS NOTES
[facility] Diabetes Education Nurse  Consult Note     Consult received for hyperglycemia after pt received 125 mg solumedrol. Chart reviewed. Blood sugar still high but now trending back down. Steroids have been decreased, Lantus and prandial humalog have been appropriately added. A1C 6.7% 4/28/22 indicative of controlled glucose on home insulin regimen. DM educator will continue to follow as needed.     Heber Delarosa MSN, RN, [facility]  Certified Diabetes Care and

## 2022-05-18 NOTE — CARE COORDINATION
Discharge Planning Assessment  Discharge Planning Assessment  RN/SW discharge planner met with patient/ (and family member) to discuss reason for admission, current living situation, and potential needs at the time of discharge    Demographics/Insurance verified Yes    Current type of dwelling:Single story home with 3 steps up to porch. Has a basement that pt goes down to occasionally and there are 10 steps     Patient from ECF/SW confirmed with:n/a     Living arrangements:lives with spouse     Level of function/Support:indpendent \" but slow\" per pt. Has supportive family     PCP:Kaden Simon     Last Visit to PCP:was just seen by NP at office and sent into hospital     DME:none     Active with any community resources/agencies/skilled home care:none     Medication compliance issues:Independent     Financial issues that could impact healthcare:none         Tentative discharge plan:probably home with possible home care therapy pending   Discussed and provided facilities of choice if transition to a skilled nursing facility is required at the time of discharge    Discussed with patient and/or family that on the day of discharge home tentative time of discharge will be between 10 AM and noon.     Transportation at the time of [address] [facility], [facility], [facility]  [phone]

## 2022-05-18 NOTE — CONSULTS
hemorrhage     Palpitations     Skin cancer     basal and squamous    Type II or unspecified type diabetes mellitus without mention of complication, not stated as uncontrolled        Surgical History:      Procedure Laterality Date    BRONCHOSCOPY  2016    Dr. Sandra Carrillo [place] [address] [place] [address]  [place]    Dr. Lord Xiong and 5/3 2021    Cardiac cath, cardioversion and rafat     SECTION      CHOLECYSTECTOMY      COLONOSCOPY  2017    Dr. Cristela Blanco - sigmoid diverticulosis, polypectomies x3    COLONOSCOPY  2014    Dr. Cristela Blanco - sigmoid diverticulosis, polypectomies x3, internal hemorrhoids    COLONOSCOPY  10/10/2018    w/biopsy performed by Ta Fuentes MD at [address] N/A 2020    COLONOSCOPY DIAGNOSTIC performed by Sandra Carrillo MD at [address]  2018    Dr. Dewayne Stapleton - x3 (LIMA-LAD, L SV-D1-PLV) modified BL MAZE procedure w/obliteration of WAYNE using 45mm AtriClip    INSERTABLE CARDIAC MONITOR Left 2018    Dr. Perri Jaime - Monica Pisano SN# [id number] Med[employer]    MITRAL VALVE REPLACEMENT  2018    Dr. Dewayne Stapleton - 27mm Medtronic Cinch tissue valve    PAIN MANAGEMENT PROCEDURE N/A 2020    C6-C7 MIDLINE  EPIDURAL STEROID INJECTION WITH FLUOROSCOPY performed by Keesha Cruz MD at [address] Bilateral 2021    BILATERAL T11 TRANSFORAMINAL EPIDURAL STEROID INJECTION WITH FLUOROSCOPY performed by Keesha Cruz MD at [address] TRANSESOPHAGEAL ECHOCARDIOGRAM  2018    during CABG/MVR    TUNNELED VENOUS CATHETER PLACEMENT Left 2018    Dr. Sabrina Pemberton - IJ for HD---since removed    UPPER GASTROINTESTINAL ENDOSCOPY N/A 10/10/2018    w/biopsy performed by Ta Fuentes MD at [address] History:  Social History     Socioeconomic History    Marital status:  Spouse name: Not on file    Number of children: Not on file    Years of education: Not on file    Highest education level: Not on file   Occupational History    Not on file   Tobacco Use    Smoking status: Never Smoker    Smokeless tobacco: Never Used   Vaping Use    Vaping Use: Never used   Substance and Sexual Activity    Alcohol use: No    Drug use: No    Sexual activity: Not on file   Other Topics Concern    Not on file   Social History Narrative    Not on file     Social Determinants of Health     Financial Resource Strain: Low Risk     Difficulty of Paying Living Expenses: Not hard at all   Food Insecurity: No Food Insecurity    Worried About [facility] in the Last Year: Never true    [facility] in the Last Year: Never true   Transportation Needs:     Lack of Transportation (Medical): Not on file    Lack of Transportation (Non-Medical): Not on file   Physical Activity:     Days of Exercise per Week: Not on file    Minutes of Exercise per Session: Not on file   Stress:     Feeling of Stress : Not on file   Social Connections:     Frequency of Communication with Friends and Family: Not on file    Frequency of Social Gatherings with Friends and Family: Not on file    Attends Taoist Services: Not on file    Active Member of [address] [place] [address] or Organizations: Not on file    Attends Club or Organization Meetings: Not on file    Marital Status: Not on file   Intimate Partner Violence:     Fear of Current or Ex-Partner: Not on file    Emotionally Abused: Not on file    Physically Abused: Not on file    Sexually Abused: Not on file   Housing Stability:     Unable to Pay for Housing in the Last Year: Not on file    Number of [place] in the Last Year: Not on file    Unstable Housing in the Last Year: Not on file        Family History:  No evidence for sudden cardiac death or premature CAD.       Problem Relation Age of Onset    Cancer Father     Asthma Mother     Hypertension Mother     Heart Disease Mother     High Blood Pressure Mother        Medications:  insulin lispro (HUMALOG) injection vial 0-18 Units, TID WC  insulin lispro (HUMALOG) injection vial 0-9 Units, Nightly  warfarin (COUMADIN) tablet 5 mg, Once  insulin glargine (LANTUS) injection vial 22 Units, Daily  insulin lispro (HUMALOG) injection vial 5 Units, TID WC  albuterol sulfate  (90 Base) MCG/ACT inhaler 1 puff, Q6H PRN  amiodarone (CORDARONE) tablet 100 mg, Daily  aspirin chewable tablet 81 mg, Daily  calcitRIOL (ROCALTROL) capsule 0.25 mcg, Daily  ipratropium-albuterol (DUONEB) nebulizer solution 3 mL, Q4H PRN  levothyroxine (SYNTHROID) tablet 100 mcg, Daily  magnesium oxide (MAG-OX) tablet 400 mg, Daily  metoprolol succinate (TOPROL XL) extended release tablet 50 mg, Daily  montelukast (SINGULAIR) tablet 10 mg, Nightly  pantoprazole (PROTONIX) tablet 40 mg, QAM AC  dextrose bolus 10% 125 mL, PRN   Or  dextrose bolus 10% 250 mL, PRN  glucagon (rDNA) injection 1 mg, PRN  dextrose 5 % solution, PRN  sodium chloride flush 0.9 % injection 5-40 mL, 2 times per day  sodium chloride flush 0.9 % injection 5-40 mL, PRN  0.9 % sodium chloride infusion, PRN  ondansetron (ZOFRAN-ODT) disintegrating tablet 4 mg, Q8H PRN   Or  ondansetron (ZOFRAN) injection 4 mg, Q6H PRN  polyethylene glycol (GLYCOLAX) packet 17 g, Daily PRN  acetaminophen (TYLENOL) tablet 650 mg, Q6H PRN   Or  acetaminophen (TYLENOL) suppository 650 mg, Q6H PRN  methylPREDNISolone sodium (SOLU-MEDROL) injection 40 mg, Q12H  ipratropium-albuterol (DUONEB) nebulizer solution 1 ampule, Q4H WA  doxycycline (VIBRAMYCIN) 100 mg in dextrose 5 % 100 mL IVPB, Q12H  guaiFENesin-dextromethorphan (ROBITUSSIN DM) 100-10 MG/5ML syrup 5 mL, Q4H PRN  haloperidol lactate (HALDOL) injection 2 mg, Once  diphenhydrAMINE (BENADRYL) injection 25 mg, Once  warfarin placeholder: dosing by pharmacy, RX Placeholder  oxyCODONE (ROXICODONE) immediate release tablet 5 mg, Q4H PRN        Allergies:  Fomhplsk-joqhnri-svcroi [fluocinolone], Ciprofloxacin, Diovan [valsartan], Flagyl [metronidazole], Metformin and related [metformin and related], Benazepril, Morphine, Saxagliptin, and Levaquin [levofloxacin]     Review of Systems:   [x]Full ROS obtained and negative except as mentioned in HPI    Physical Examination:    /79   Pulse 87   Temp 97.6 °F (36.4 °C) (Oral)   Resp 16   Ht 5' 8\" (1.727 m)   Wt 198 lb 14.4 oz (90.2 kg)   SpO2 94%   BMI 30.24 kg/m²   Wt Readings from Last 3 Encounters:   05/18/22 198 lb 14.4 oz (90.2 kg)   05/06/22 206 lb (93.4 kg)   05/06/22 204 lb 4 oz (92.6 kg)       GENERAL: Well developed, well nourished, no acute distress  NEUROLOGICAL: Alert and oriented x3  PSYCH: Normal mood and affect   SKIN: Warm and dry, without lesions  HEENT: Normocephalic, atraumatic, Sclera non-icteric, mucous membranes moist  NECK: supple, JVP normal, thyroid not enlarged   CAROTID: Normal upstroke, no bruits  CARDIAC: Normal PMI, regular rate and rhythm, normal S1S2, no murmur, rub  RESPIRATORY: Normal respiratory effort, coarse to auscultation bilaterally  EXTREMITIES: No cyanosis, clubbing or edema, palpable pulses bilaterally   MUSCULOSKELETAL: No joint swelling or tenderness, no chest wall tenderness  GASTROINTESTINAL:  soft, non-tender, no bruit    Labs:  Lab Review   Lab Results   Component Value Date     05/18/2022    K 4.0 05/18/2022    CL 94 05/18/2022    CO2 24 05/18/2022    BUN 71 05/18/2022    CREATININE 2.7 05/18/2022    GLUCOSE 441 05/18/2022    CALCIUM 8.9 05/18/2022     Lab Results   Component Value Date    TROPONINI 0.04 05/17/2022     Lab Results   Component Value Date    WBC 4.8 05/18/2022    HGB 12.4 05/18/2022    HCT 37.8 05/18/2022    MCV 89.0 05/18/2022     05/18/2022     Lab Results   Component Value Date    CHOL 162 11/19/2021    TRIG 89 11/19/2021    HDL 54 11/19/2021    LDLDIRECT 127 08/21/2018       Imaging:  I have reviewed the

## 2022-05-18 NOTE — PROGRESS NOTES
05/18/22 0120   RT Protocol   History Pulmonary Disease 0   Respiratory pattern 0   Breath sounds 2   Cough 0   Indications for Bronchodilator Therapy Wheezing associated with pulm disorder   Bronchodilator Assessment Score 2

## 2022-05-18 NOTE — PLAN OF CARE
Problem: Discharge Planning  Goal: Discharge to home or other facility with appropriate resources  Outcome: Progressing  Flowsheets (Taken 5/17/2022 1904 by Triston Cherry RN)  Discharge to home or other facility with appropriate resources:   Identify barriers to discharge with patient and caregiver   Arrange for interpreters to assist at discharge as needed     Problem: Pain  Goal: Verbalizes/displays adequate comfort level or baseline comfort level  Outcome: Progressing     Problem: Skin/Tissue Integrity  Goal: Absence of new skin breakdown  Description: 1. Monitor for areas of redness and/or skin breakdown  2. Assess vascular access sites hourly  3. Every 4-6 hours minimum:  Change oxygen saturation probe site  4. Every 4-6 hours:  If on nasal continuous positive airway pressure, respiratory therapy assess nares and determine need for appliance change or resting period.   Outcome: Progressing     Problem: Safety - Adult  Goal: Free from fall injury  Outcome: Progressing     Problem: ABCDS Injury Assessment  Goal: Absence of physical injury  Outcome: Progressing

## 2022-05-18 NOTE — RT PROTOCOL NOTE
RT Inhaler-Nebulizer Bronchodilator Protocol Note    There is a bronchodilator order in the chart from a provider indicating to follow the RT Bronchodilator Protocol and there is an Initiate RT Inhaler-Nebulizer Bronchodilator Protocol order as well (see protocol at bottom of note). CXR Findings:  XR CHEST PORTABLE    Result Date: 5/17/2022  Clear lungs. The findings from the last RT Protocol Assessment were as follows:   History Pulmonary Disease: None or smoker <15 pack years  Respiratory Pattern: Regular pattern and RR 12-20 bpm  Breath Sounds: Slightly diminished and/or crackles  Cough: Strong, spontaneous, non-productive  Indication for Bronchodilator Therapy: Wheezing associated with pulm disorder  Bronchodilator Assessment Score: 2    Aerosolized bronchodilator medication orders have been revised according to the RT Inhaler-Nebulizer Bronchodilator Protocol below. Respiratory Therapist to perform RT Therapy Protocol Assessment initially then follow the protocol. Repeat RT Therapy Protocol Assessment PRN for score 0-3 or on second treatment, BID, and PRN for scores above 3. No Indications  adjust the frequency to every 6 hours PRN wheezing or bronchospasm, if no treatments needed after 48 hours then discontinue using Per Protocol order mode. If indication present, adjust the RT bronchodilator orders based on the Bronchodilator Assessment Score as indicated below. Use Inhaler orders unless patient has one or more of the following: on home nebulizer, not able to hold breath for 10 seconds, is not alert and oriented, cannot activate and use MDI correctly, or respiratory rate 25 breaths per minute or more, then use the equivalent nebulizer order(s) with same Frequency and PRN reasons based on the score. If a patient is on this medication at home then do not decrease Frequency below that used at home.     0-3  enter or revise RT bronchodilator order(s) to equivalent RT Bronchodilator order with Frequency of every 4 hours PRN for wheezing or increased work of breathing using Per Protocol order mode. 4-6  enter or revise RT Bronchodilator order(s) to two equivalent RT bronchodilator orders with one order with BID Frequency and one order with Frequency of every 4 hours PRN wheezing or increased work of breathing using Per Protocol order mode. 7-10  enter or revise RT Bronchodilator order(s) to two equivalent RT bronchodilator orders with one order with TID Frequency and one order with Frequency of every 4 hours PRN wheezing or increased work of breathing using Per Protocol order mode. 11-13  enter or revise RT Bronchodilator order(s) to one equivalent RT bronchodilator order with QID Frequency and an Albuterol order with Frequency of every 4 hours PRN wheezing or increased work of breathing using Per Protocol order mode. Greater than 13  enter or revise RT Bronchodilator order(s) to one equivalent RT bronchodilator order with every 4 hours Frequency and an Albuterol order with Frequency of every 2 hours PRN wheezing or increased work of breathing using Per Protocol order mode. RT to enter RT Home Evaluation for COPD & MDI Assessment order using Per Protocol order mode.     Electronically signed by IRIS GARCIA RCP on 5/18/2022 at 1:20 AM

## 2022-05-19 LAB
ANION GAP SERPL CALCULATED.3IONS-SCNC: 15 MMOL/L (ref 3–16)
ANION GAP SERPL CALCULATED.3IONS-SCNC: 16 MMOL/L (ref 3–16)
BASOPHILS ABSOLUTE: 0 K/UL (ref 0–0.2)
BASOPHILS RELATIVE PERCENT: 0.1 %
BUN BLDV-MCNC: 75 MG/DL (ref 7–20)
BUN BLDV-MCNC: 76 MG/DL (ref 7–20)
CALCIUM SERPL-MCNC: 9.1 MG/DL (ref 8.3–10.6)
CALCIUM SERPL-MCNC: 9.1 MG/DL (ref 8.3–10.6)
CHLORIDE BLD-SCNC: 88 MMOL/L (ref 99–110)
CHLORIDE BLD-SCNC: 92 MMOL/L (ref 99–110)
CO2: 23 MMOL/L (ref 21–32)
CO2: 23 MMOL/L (ref 21–32)
CREAT SERPL-MCNC: 2 MG/DL (ref 0.6–1.2)
CREAT SERPL-MCNC: 2.4 MG/DL (ref 0.6–1.2)
CREATININE URINE: 112.8 MG/DL (ref 28–259)
EOSINOPHILS ABSOLUTE: 0 K/UL (ref 0–0.6)
EOSINOPHILS RELATIVE PERCENT: 0 %
GFR AFRICAN AMERICAN: 24
GFR AFRICAN AMERICAN: 29
GFR NON-AFRICAN AMERICAN: 20
GFR NON-AFRICAN AMERICAN: 24
GLUCOSE BLD-MCNC: 252 MG/DL (ref 70–99)
GLUCOSE BLD-MCNC: 334 MG/DL (ref 70–99)
GLUCOSE BLD-MCNC: 351 MG/DL (ref 70–99)
GLUCOSE BLD-MCNC: 356 MG/DL (ref 70–99)
GLUCOSE BLD-MCNC: 366 MG/DL (ref 70–99)
GLUCOSE BLD-MCNC: 403 MG/DL (ref 70–99)
GLUCOSE BLD-MCNC: 84 MG/DL (ref 70–99)
HCT VFR BLD CALC: 35.3 % (ref 36–48)
HEMOGLOBIN: 11.3 G/DL (ref 12–16)
INR BLD: 2.44 (ref 0.88–1.12)
IRON SATURATION: 18 % (ref 15–50)
IRON: 57 UG/DL (ref 37–145)
LYMPHOCYTES ABSOLUTE: 0.5 K/UL (ref 1–5.1)
LYMPHOCYTES RELATIVE PERCENT: 4.4 %
MCH RBC QN AUTO: 28.3 PG (ref 26–34)
MCHC RBC AUTO-ENTMCNC: 32.2 G/DL (ref 31–36)
MCV RBC AUTO: 87.9 FL (ref 80–100)
MONOCYTES ABSOLUTE: 0.3 K/UL (ref 0–1.3)
MONOCYTES RELATIVE PERCENT: 2.5 %
NEUTROPHILS ABSOLUTE: 9.8 K/UL (ref 1.7–7.7)
NEUTROPHILS RELATIVE PERCENT: 93 %
PDW BLD-RTO: 17.6 % (ref 12.4–15.4)
PERFORMED ON: ABNORMAL
PERFORMED ON: NORMAL
PLATELET # BLD: 239 K/UL (ref 135–450)
PMV BLD AUTO: 8.9 FL (ref 5–10.5)
POTASSIUM SERPL-SCNC: 3.8 MMOL/L (ref 3.5–5.1)
POTASSIUM SERPL-SCNC: 3.9 MMOL/L (ref 3.5–5.1)
PROTEIN PROTEIN: 0.01 G/DL
PROTEIN PROTEIN: 10 MG/DL
PROTHROMBIN TIME: 28.6 SEC (ref 9.9–12.7)
RBC # BLD: 4.01 M/UL (ref 4–5.2)
SODIUM BLD-SCNC: 126 MMOL/L (ref 136–145)
SODIUM BLD-SCNC: 131 MMOL/L (ref 136–145)
SODIUM URINE: <20 MMOL/L
TOTAL IRON BINDING CAPACITY: 319 UG/DL (ref 260–445)
WBC # BLD: 10.6 K/UL (ref 4–11)

## 2022-05-19 PROCEDURE — 80048 BASIC METABOLIC PNL TOTAL CA: CPT

## 2022-05-19 PROCEDURE — 6370000000 HC RX 637 (ALT 250 FOR IP): Performed by: INTERNAL MEDICINE

## 2022-05-19 PROCEDURE — 2500000003 HC RX 250 WO HCPCS: Performed by: HOSPITALIST

## 2022-05-19 PROCEDURE — 6370000000 HC RX 637 (ALT 250 FOR IP): Performed by: NURSE PRACTITIONER

## 2022-05-19 PROCEDURE — 99233 SBSQ HOSP IP/OBS HIGH 50: CPT | Performed by: CLINICAL NURSE SPECIALIST

## 2022-05-19 PROCEDURE — 6370000000 HC RX 637 (ALT 250 FOR IP): Performed by: HOSPITALIST

## 2022-05-19 PROCEDURE — 85610 PROTHROMBIN TIME: CPT

## 2022-05-19 PROCEDURE — 1200000000 HC SEMI PRIVATE

## 2022-05-19 PROCEDURE — 6360000002 HC RX W HCPCS: Performed by: HOSPITALIST

## 2022-05-19 PROCEDURE — 97165 OT EVAL LOW COMPLEX 30 MIN: CPT

## 2022-05-19 PROCEDURE — 94761 N-INVAS EAR/PLS OXIMETRY MLT: CPT

## 2022-05-19 PROCEDURE — 97161 PT EVAL LOW COMPLEX 20 MIN: CPT

## 2022-05-19 PROCEDURE — 94640 AIRWAY INHALATION TREATMENT: CPT

## 2022-05-19 PROCEDURE — 36415 COLL VENOUS BLD VENIPUNCTURE: CPT

## 2022-05-19 PROCEDURE — 97116 GAIT TRAINING THERAPY: CPT

## 2022-05-19 PROCEDURE — 83540 ASSAY OF IRON: CPT

## 2022-05-19 PROCEDURE — 83550 IRON BINDING TEST: CPT

## 2022-05-19 PROCEDURE — 97530 THERAPEUTIC ACTIVITIES: CPT

## 2022-05-19 PROCEDURE — 2580000003 HC RX 258: Performed by: HOSPITALIST

## 2022-05-19 PROCEDURE — 85025 COMPLETE CBC W/AUTO DIFF WBC: CPT

## 2022-05-19 RX ORDER — METHYLPREDNISOLONE SODIUM SUCCINATE 40 MG/ML
40 INJECTION, POWDER, LYOPHILIZED, FOR SOLUTION INTRAMUSCULAR; INTRAVENOUS DAILY
Status: DISCONTINUED | OUTPATIENT
Start: 2022-05-20 | End: 2022-05-20

## 2022-05-19 RX ORDER — WARFARIN SODIUM 2.5 MG/1
2.5 TABLET ORAL
Status: DISCONTINUED | OUTPATIENT
Start: 2022-05-20 | End: 2022-05-19 | Stop reason: DRUGHIGH

## 2022-05-19 RX ORDER — WARFARIN SODIUM 5 MG/1
5 TABLET ORAL
Status: DISCONTINUED | OUTPATIENT
Start: 2022-05-19 | End: 2022-05-19 | Stop reason: DRUGHIGH

## 2022-05-19 RX ORDER — WARFARIN SODIUM 2.5 MG/1
2.5 TABLET ORAL DAILY
Status: DISCONTINUED | OUTPATIENT
Start: 2022-05-19 | End: 2022-05-20

## 2022-05-19 RX ORDER — INSULIN LISPRO 100 [IU]/ML
14 INJECTION, SOLUTION INTRAVENOUS; SUBCUTANEOUS ONCE
Status: COMPLETED | OUTPATIENT
Start: 2022-05-19 | End: 2022-05-19

## 2022-05-19 RX ORDER — SODIUM CHLORIDE 9 MG/ML
INJECTION, SOLUTION INTRAVENOUS CONTINUOUS
Status: DISCONTINUED | OUTPATIENT
Start: 2022-05-19 | End: 2022-05-23

## 2022-05-19 RX ORDER — BISACODYL 10 MG
10 SUPPOSITORY, RECTAL RECTAL DAILY PRN
Status: DISCONTINUED | OUTPATIENT
Start: 2022-05-19 | End: 2022-05-21

## 2022-05-19 RX ADMIN — METHYLPREDNISOLONE SODIUM SUCCINATE 40 MG: 40 INJECTION, POWDER, FOR SOLUTION INTRAMUSCULAR; INTRAVENOUS at 03:55

## 2022-05-19 RX ADMIN — Medication 10 ML: at 22:30

## 2022-05-19 RX ADMIN — IPRATROPIUM BROMIDE AND ALBUTEROL SULFATE 1 AMPULE: 2.5; .5 SOLUTION RESPIRATORY (INHALATION) at 11:41

## 2022-05-19 RX ADMIN — INSULIN LISPRO 5 UNITS: 100 INJECTION, SOLUTION INTRAVENOUS; SUBCUTANEOUS at 08:59

## 2022-05-19 RX ADMIN — INSULIN LISPRO 5 UNITS: 100 INJECTION, SOLUTION INTRAVENOUS; SUBCUTANEOUS at 16:31

## 2022-05-19 RX ADMIN — DOXYCYCLINE 100 MG: 100 INJECTION, POWDER, LYOPHILIZED, FOR SOLUTION INTRAVENOUS at 13:32

## 2022-05-19 RX ADMIN — MONTELUKAST SODIUM 10 MG: 10 TABLET, FILM COATED ORAL at 22:21

## 2022-05-19 RX ADMIN — INSULIN GLARGINE 22 UNITS: 100 INJECTION, SOLUTION SUBCUTANEOUS at 08:59

## 2022-05-19 RX ADMIN — AMIODARONE HYDROCHLORIDE 100 MG: 200 TABLET ORAL at 08:55

## 2022-05-19 RX ADMIN — SODIUM CHLORIDE: 9 INJECTION, SOLUTION INTRAVENOUS at 20:53

## 2022-05-19 RX ADMIN — IPRATROPIUM BROMIDE AND ALBUTEROL SULFATE 1 AMPULE: 2.5; .5 SOLUTION RESPIRATORY (INHALATION) at 07:43

## 2022-05-19 RX ADMIN — INSULIN LISPRO 12 UNITS: 100 INJECTION, SOLUTION INTRAVENOUS; SUBCUTANEOUS at 09:00

## 2022-05-19 RX ADMIN — INSULIN LISPRO 14 UNITS: 100 INJECTION, SOLUTION INTRAVENOUS; SUBCUTANEOUS at 03:55

## 2022-05-19 RX ADMIN — POLYETHYLENE GLYCOL 3350 17 G: 17 POWDER, FOR SOLUTION ORAL at 09:30

## 2022-05-19 RX ADMIN — Medication 10 ML: at 08:55

## 2022-05-19 RX ADMIN — CALCITRIOL 0.25 MCG: 0.25 CAPSULE ORAL at 08:55

## 2022-05-19 RX ADMIN — DOXYCYCLINE 100 MG: 100 INJECTION, POWDER, LYOPHILIZED, FOR SOLUTION INTRAVENOUS at 22:28

## 2022-05-19 RX ADMIN — IPRATROPIUM BROMIDE AND ALBUTEROL SULFATE 1 AMPULE: 2.5; .5 SOLUTION RESPIRATORY (INHALATION) at 20:49

## 2022-05-19 RX ADMIN — INSULIN LISPRO 9 UNITS: 100 INJECTION, SOLUTION INTRAVENOUS; SUBCUTANEOUS at 16:31

## 2022-05-19 RX ADMIN — INSULIN LISPRO 18 UNITS: 100 INJECTION, SOLUTION INTRAVENOUS; SUBCUTANEOUS at 12:46

## 2022-05-19 RX ADMIN — INSULIN LISPRO 5 UNITS: 100 INJECTION, SOLUTION INTRAVENOUS; SUBCUTANEOUS at 12:46

## 2022-05-19 RX ADMIN — ASPIRIN 81 MG 81 MG: 81 TABLET ORAL at 08:55

## 2022-05-19 RX ADMIN — Medication 400 MG: at 08:55

## 2022-05-19 RX ADMIN — METOPROLOL SUCCINATE 50 MG: 50 TABLET, EXTENDED RELEASE ORAL at 08:55

## 2022-05-19 RX ADMIN — LEVOTHYROXINE SODIUM 100 MCG: 0.1 TABLET ORAL at 08:55

## 2022-05-19 RX ADMIN — OXYCODONE 5 MG: 5 TABLET ORAL at 09:09

## 2022-05-19 RX ADMIN — BISACODYL 10 MG: 10 SUPPOSITORY RECTAL at 15:24

## 2022-05-19 RX ADMIN — WARFARIN SODIUM 2.5 MG: 2.5 TABLET ORAL at 17:37

## 2022-05-19 RX ADMIN — PANTOPRAZOLE SODIUM 40 MG: 40 TABLET, DELAYED RELEASE ORAL at 06:36

## 2022-05-19 ASSESSMENT — PAIN DESCRIPTION - DESCRIPTORS: DESCRIPTORS: ACHING

## 2022-05-19 ASSESSMENT — PAIN SCALES - GENERAL
PAINLEVEL_OUTOF10: 6
PAINLEVEL_OUTOF10: 3
PAINLEVEL_OUTOF10: 4

## 2022-05-19 ASSESSMENT — PAIN DESCRIPTION - ORIENTATION: ORIENTATION: MID

## 2022-05-19 ASSESSMENT — PAIN DESCRIPTION - LOCATION: LOCATION: BACK

## 2022-05-19 NOTE — PROGRESS NOTES
[facility]   Nephrology progress Note  [phone]   [facility] [employer]           Reason for Consult: TORIBIO /CKD    The patient is a 77 y.o. female with significant past medical history of atrial fibrillation, asthma for eosinophilia, hyperlipidemia, hypertension, gastritis, type 2 diabetes mellitus, presented to the ER with progressive shortness of breath over the last week. She been diagnosed as having acute bronchitis and has received antibiotics. However she kept feeling worse and while in the PCPs office she had persistent cough and shortness of breath and was sent to the ER. She also describes sharp chest pain, there is no hemoptysis no productive sputum  She was found to have acute on chronic kidney disease with chronic atrial fibrillation and possible underlying pneumonia  Her serum creatinine was 2.6 with bun of 61. Has early chronic kidney disease stage IV with a EGFR of 29 and a creatinine of 1.7  Denies any problem with urination  She thinks 2 of the plugs she coughed up might of been streaked with blood        Interval History:     breathing better    Patient and daughter say that the labs reported today are not hers she has not had a lab done hence we will repeat 1  Lab done at 12.50 : Cr 2, na 126, blood sugar 366     PHYSICAL EXAM:    Vitals:    /73   Pulse 101   Temp 97.3 °F (36.3 °C) (Oral)   Resp 18   Ht 5' 8\" (1.727 m)   Wt 200 lb 12.8 oz (91.1 kg)   SpO2 96%   BMI 30.53 kg/m²   I/O last 3 completed shifts:  In: -   Out: 450 [Urine:450]  I/O this shift:  In: 120 [P.O.:120]  Out: -     Physical Exam:  Gen:  alert, oriented x 3  PERRL , EOM +  Pallor +, No icterus  JVP not raised   CV: IRRR murmur +, No rub . Lungs:B/ L air entry, Normal breath sounds   Abd: soft, bowel sounds + , No organomegaly   Ext: No edema, no cyanosis  Skin: Warm. No rash  Neuro: no focal deficit.       DATA:    CBC with Differential:    Lab Results   Component Value Date WBC 10.6 05/19/2022    RBC 4.01 05/19/2022    HGB 11.3 05/19/2022    HCT 35.3 05/19/2022     05/19/2022    MCV 87.9 05/19/2022    MCH 28.3 05/19/2022    MCHC 32.2 05/19/2022    RDW 17.6 05/19/2022    SEGSPCT 64.1 09/02/2020    BANDSPCT 1 01/14/2019    LYMPHOPCT 4.4 05/19/2022    MONOPCT 2.5 05/19/2022    BASOPCT 0.1 05/19/2022    MONOSABS 0.3 05/19/2022    LYMPHSABS 0.5 05/19/2022    EOSABS 0.0 05/19/2022    BASOSABS 0.0 05/19/2022     CMP:    Lab Results   Component Value Date     05/19/2022    K 3.9 05/19/2022    K 4.0 05/18/2022    CL 92 05/19/2022    CO2 23 05/19/2022    BUN 75 05/19/2022    CREATININE 2.4 05/19/2022    GFRAA 24 05/19/2022    AGRATIO 1.1 05/17/2022    LABGLOM 20 05/19/2022    LABGLOM 45 12/06/2018    GLUCOSE 351 05/19/2022    PROT 7.2 05/17/2022    LABALBU 3.8 05/17/2022    CALCIUM 9.1 05/19/2022    BILITOT 0.8 05/17/2022    ALKPHOS 131 05/17/2022    AST 22 05/17/2022    ALT 18 05/17/2022     Magnesium:    Lab Results   Component Value Date    MG 2.30 08/13/2021     Phosphorus:    Lab Results   Component Value Date    PHOS 4.0 04/28/2022     Uric Acid:  No results found for: LABURIC, URICACID  U/A:    Lab Results   Component Value Date    COLORU Yellow 04/28/2022    PROTEINU Negative 04/28/2022    PHUR 7.0 04/28/2022    WBCUA 3 04/15/2021    RBCUA 2 04/15/2021    YEAST neg 02/12/2021    BACTERIA trace 02/12/2021    BACTERIA 1+ 05/11/2020    CLARITYU Clear 04/28/2022    SPECGRAV 1.009 04/28/2022    LEUKOCYTESUR Negative 04/28/2022    UROBILINOGEN 1.0 04/28/2022    BILIRUBINUR Negative 04/28/2022    BLOODU Negative 04/28/2022    GLUCOSEU Negative 04/28/2022           IMPRESSION/RECOMMENDATIONS:      1. TORIBIO:   prerenal in nature    fractional excretion sodium < 1 , Mary < 20    Cr 2 ,eGFR 24  better     2. CKD stage IV with a EGFR of 29   follows up with me in office has been investigated    3. CHF: Currently euvolemic    4. Pneumonia on antibiotics    5.   Atrial fibrillation which is rate controlled    Thank you for allowing me to participate in the care of this patient. I will continue to follow along. Please call with questions.     Trinity Muñoz MD, MD  5/19/2022  [facility]

## 2022-05-19 NOTE — PROGRESS NOTES
[facility]   Daily Progress Note      Admit Date:  5/17/2022    HPI:    Ms. Eli Parham is a 77 y.o. female with history of CAD/CABG in 2018, PAF with maze 2018, HTN, HLD, DVT/PE on coumadin, 2 CVs unsuccessful, systolic heart failure with improvement in LVEF    She is admitted for persistent cough/chest congestionpneumonia after 2 failed po rounds of antibiotics. Her weight stays 198-200. Her creat was elevated on admission. Subjective:  Patient is being seen for chronic systolic heart failure. There were no acute overnight cardiac events. She is sitting up in the chair with her daughter at her side. She is feeling better, still having difficulty with coughing up secretions. Abdomen distended due to need for BM. No edema in legs  Creat 2.6-2.4 sodium 131 Hgb 11.3 weight 196-200    Objective:   /73   Pulse 89   Temp 97.3 °F (36.3 °C) (Oral)   Resp 18   Ht 5' 8\" (1.727 m)   Wt 200 lb 12.8 oz (91.1 kg)   SpO2 91%   BMI 30.53 kg/m²     Intake/Output Summary (Last 24 hours) at 5/19/2022 0911  Last data filed at 5/19/2022 0849  Gross per 24 hour   Intake 120 ml   Output 450 ml   Net -330 ml          Physical Exam:  General:  Awake, alert, oriented in NAD  Skin:  Warm and dry. No unusual bruising or rash  Neck:  Supple. No JVD or carotid bruit appreciated  Chest:  Normal effort.   Rhonchi bilateral in bases L>R  Cardiovascular:  RRR, S1/S2, no murmur/gallop/rub  Abdomen:  Soft, distended, +bowel sounds  Extremities:  No edema  Neurological: No focal deficits  Psychological: Normal mood and affect      Medications:    [START ON 5/20/2022] methylPREDNISolone  40 mg IntraVENous Daily    insulin lispro  0-18 Units SubCUTAneous TID WC    insulin lispro  0-9 Units SubCUTAneous Nightly    insulin glargine  22 Units SubCUTAneous Daily    insulin lispro  5 Units SubCUTAneous TID WC    amiodarone  100 mg Oral Daily    aspirin  81 mg Oral Daily    calcitRIOL  0.25 mcg Oral Daily    levothyroxine  100 mcg Oral Daily    magnesium oxide  400 mg Oral Daily    metoprolol succinate  50 mg Oral Daily    montelukast  10 mg Oral Nightly    pantoprazole  40 mg Oral QAM AC    sodium chloride flush  5-40 mL IntraVENous 2 times per day    ipratropium-albuterol  1 ampule Inhalation Q4H WA    doxycycline (VIBRAMYCIN) IV  100 mg IntraVENous Q12H    haloperidol lactate  2 mg IntraVENous Once    diphenhydrAMINE  25 mg IntraVENous Once    warfarin placeholder: dosing by pharmacy   Other RX Placeholder      dextrose      sodium chloride 25 mL (05/18/22 2242)       Lab Data:  CBC:   Recent Labs     05/17/22  1601 05/18/22  0423 05/19/22  0456   WBC 9.1 4.8 10.6   HGB 13.2 12.4 11.3*    227 239     BMP:    Recent Labs     05/17/22  1601 05/18/22  0423 05/19/22  0456   * 132* 131*   K 4.1 4.0 3.9   CO2 27 24 23   BUN 61* 71* 75*   CREATININE 2.6* 2.7* 2.4*     INR:    Recent Labs     05/17/22  1601 05/18/22  0423 05/19/22  0457   INR 3.76* 2.89* 2.44*     BNP:    Recent Labs     05/17/22  1601   PROBNP 3,211*         Diagnostics:  Echo 5/6/2022  Summary   Technically difficult examination due to visualization and irregular rhythm   Normal left ventricle size.   There is moderate concentric left ventricular hypertrophy.   Ejection fraction is visually estimated to be 40%.   Overall left ventricular systolic function appears moderately reduced.   There is akinesis of the apex walls. 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   No evidence of apical thrombus by contrast administration.   A bioprosthetic mitral valve is well seated. Visually opens well.  Cannot   adequately assess for dysfunction as gradients not obtained.   The left atrium is moderately dilated.   No pericardial effusion.     [facility] Preliminary 5/4/2021 Dr Jessica Park  AV - area ~ 1.5, opens adequately, not severe aortic stenosis     [facility] 5/3/2021 Dr Jessica Park  Artery Findings/Result   LM Normal   LAD 50% mid, 70% mid, competitive flow distal from LIMA   Cx OM1 20% ostial to prox, superior branch mid OM1 50% ostial   RI N/A   S-D-RPL patent   L-LAD patent   RCA 50-60% distal, very heavily calcified   LVEDP 15   AV Peak to peak gradient 36mmHg, valve crossed easily with pigtail. LVG NA      Intervention:         None     Post Cath Dx:       Patent grafts    Echo 4/20/21  Summary   Overall left ventricular systolic function is severely depressed .   Ejection fraction is visually estimated to be 10-15 %. E/e'= 23.2 GLS=-3.4   Moderately dilated left ventricle.   There is severe diffuse hypokinesis.   Indeterminate diastolic function.   A bioprosthetic mitral valve is well seated with peak velocity of 1.59m/s   and a mean gradient of 3mmHg.   The left atrium is moderately dilated.   Mild aortic stenosis with a peak velocity of 2.5m/s and a mean pressure   gradient of 14mmHg.   The aortic valve is thickened/calcified with decreased leaflet mobility   consistent with aortic stenosis.   Mild to moderate tricuspid regurgitation.   Estimated pulmonary artery systolic pressure is mildly to moderately   elevated at 46 mmHg assuming a right atrial pressure of 8 mmHg    Assessment:    1. Cough  2. CAD status post grafts stents 2021  3. ICD in place  4. Chronic systolic heart failure, compensated with improvement in LVEF 10-40%  5. Status post MVR  6. TORIBIO on CKD      Plan:    1. CHF nurse following for diet education, fluid restriction and daily weights  2. Nephrology following  3. Will check iron studies  4. Continue metoprolol 50 mg daily  5. Holding metolazone, torsemide and aldactone for now  6. Provided IS to use every hour    Discussed with patient who is agreeable with plan of care. Thank you for allowing me to participate in the care of your patient.     Johnathan Beverly, SOLEDAD - CNS, CNS

## 2022-05-19 NOTE — PROGRESS NOTES
[facility] [facility]   Phone: [phone]    Occupational Therapy    [x] Initial Evaluation            [] Daily Treatment Note         [x] Discharge Summary      Patient: Yulisa Knight   : 1945   MRN: [id number]   Date of Service:  2022    Admitting Diagnosis:  TORIBIO (acute kidney injury) [facility])  Current Admission Summary: The pt was admitted with SOB and cough. Pt was diagnosed with bronchitis as well as TORIBIO on CKD. Past Medical History:  has a past medical history of Asthma, Atrial fibrillation ([address] Eosinophilia, Hemoptysis, HIGH CHOLESTEROL, Hx of blood clots, Hypertension, Irregular heart beat, Other specified gastritis without mention of hemorrhage, Palpitations, Skin cancer, and Type II or unspecified type diabetes mellitus without mention of complication, not stated as uncontrolled. Past Surgical History:  has a past surgical history that includes Cholecystectomy;  section; Colonoscopy (2017); skin biopsy; bronchoscopy (2016); Coronary artery bypass graft (2018); Mitral valve replacement (2018); transesophageal echocardiogram (2018); Tunneled venous catheter placement (Left, 2018); Cardiac catheterization (2018); Insertable Cardiac Monitor (Left, 2018); Colonoscopy (2014); Upper gastrointestinal endoscopy (N/A, 10/10/2018); Colonoscopy (10/10/2018); Colonoscopy (N/A, 2020); Pain management procedure (N/A, 2020); Pain management procedure (Bilateral, 2021); and Cardiac catheterization ( and 5/3 2021). Discharge Recommendations: Yulisa Knight scored a 22/24 on the AM-PAC ADL Inpatient form. At this time, no further OT is recommended upon discharge due to pt is at her baseline level of occupational function. Recommend patient returns to prior setting with assist as needed.        DME Required For Discharge: no DME required at discharge (Suggested reacher and sock aid, but pt denies need, as  can assist her.)    Precautions/Restrictions: medium fall risk    Positional Restrictions:no positional restrictions    Pre-Admission Information   Lives With: spouse              Type of Home: house  Home Layout: one level  Home Access: 3 step to enter with handrail. Handrails are located on B side. Bathroom Layout: . Comment: walk-in tub  Toilet Height: elevated height  Bathroom Equipment: . Comment: tub has a seat and grab bars  Home Equipment: rollator - 4 wheeled walker, . Comment: transport chair  Transfer Assistance: Independent without use of device  Ambulation Assistance:Independent without use of device, ambulation distance is limited by SOB   ADL Assistance: requires assistance with bathing and lower body dressing -  dries the patient's legs due to energy conservation,assist pt with donning socks. IADL Assistance: requires assistance with meal prep, requires assistance with laundry, requires assistance with cleaning, requires assistance with gardening - completes due to pt's difficulty due to her back pain and heart condition  Active :        [x]? Yes            []? No (pt does not drive often)  Hand Dominance: []? Left            []? Right  Current Employment: Retired  Hobbies: read, watch TV, stays home  Recent Falls: one fall in 2022 at a graduation party over a [address]:   Vision Gross Assessment: Impaired and Vision Corrective Device: wears glasses for reading  Hearing:   [facility]  Sensation:   WFL  Coordination Testing:   WFL    ROM:   (B) UE AROM WFL  Strength:   (B) UE strength grossly WFL (5/5 shoulders, elbows, )    Decision Making: low complexity  Clinical Presentation: stable      Subjective  General: Pt seated in chair on arrival with daughter, Nidhi Hoyos, present.  Pt pleasant and agreeable to OT eval.  Pain: 0/10  Pain Interventions: not applicable        Activities of Daily Living  Basic Activities of Daily Living  Lower Extremity Dressing: minimal assistance . Comment: based on pt's ability to reach to feet, seated, but states spouse helps with socks. .  Equipment: none  Dressing Comments: Pt already dressed in street clothes  General Comments: Pt declines need for ADL activity. Instrumental Activities of Daily Living  No IADL completed on this date. Functional Mobility  Bed Mobility  Bed mobility not completed on this date. Comments:  Transfers  Sit to stand transfer:Independent  Stand to sit transfer: Independent  Stand step transfer: Independent, . Comment No AD  Comments:  Functional Mobility:  Standing Balance: Independent. Standing Balance Comment: no AD  Functional Mobility: no device. Independent. Activity: functional mobility ~120 ft in dan, ~2 min   Comment: no LOB        Functional Outcomes  AM-PAC Inpatient Daily Activity Raw Score: 22    Cognition  WFL  Orientation:    alert and oriented x 4  [facility]     Education  Barriers To Learning: none  Patient Education: patient educated on OT role and benefits, ADL adaptive strategies, family education, discharge recommendations  Learning Assessment:  patient verbalizes and demonstrates understanding    Assessment  Activity Tolerance: Pt tolerated activity well. Slightly SOB after 2 min ambulation, but SPO2 remained at 96%. Impairments Requiring Therapeutic Intervention: none - eval with same day discharge  Prognosis: good  Clinical Assessment: Pt is at her baseline level of occupational function. She gets a little help with ADLs d/t back pain and SOB. Pt has supportive family and denies further needs. Safety Interventions: patient left in chair, call light within reach, gait belt, nurse notified and family/caregiver present    Plan  Frequency: Eval with same day discharge. No follow up required. Current Treatment Recommendations: not applicable, evaluation completed with same day discharge.     Goals  Patient Goals: Not applicable; eval & d/c  Patient eval with same day discharge. No goals set as patient is at baseline status.     Therapy Session Time     Individual Group Co-treatment   Time In    0946   Time Out    1011   Minutes    25        Timed Code Treatment Minutes:   10 min  Total Treatment Minutes:  25 min       Electronically Signed By: JOHANN Ruff, OTR/L, [id number]

## 2022-05-19 NOTE — CARE COORDINATION
CM presented and explained IMM form to pt. Pt signed form and copy placed on hard chart and original given back to pt. Therapy has no further recommendations on discharge.        Zi Jalloh RN, BSN  [phone]

## 2022-05-19 NOTE — ED PROVIDER NOTES
In addition to the advanced practice provider, I personally saw Teofilo Garcia and performed a substantive portion of the visit including all aspects of the medical decision making. Briefly, this is a 77 y.o. female here for to the ER for evaluation positive generalized fatigue and weakness, positive dyspnea acute on chronic back pain, positive chest pain and dyspnea. .    On exam, still pulses are intact mild rhonchi, no petechiae purpura    EKG  EKG was reviewed by emergency department physician in the absence of a cardiologist    Narrow complex sinus rhythm, rate , normal axis, normal NC and QRS intervals, normal Qtc, no ST elevations or depressions, normal t-wave morphology, impression NSR, no STEMI      Screenings   Paulina Coma Scale  Eye Opening: Spontaneous  Best Verbal Response: Oriented  Best Motor Response: Obeys commands  Paulina Coma Scale Score: 15        MDM  Presents to the ER for evaluation of reactive airway disease, positive mild dehydration and TORIBIO with mild troponin leak. No evidence of acute decompensated congestive heart failure severe. No evidence of acute pneumonia. Patient will be admitted for respiratory treatments, reevaluation of renal function, cardiac rule out. Negative for flu and COVID, no evidence of acute pneumonia      Patient Referrals:  No follow-up provider specified. Discharge Medications:  Current Discharge Medication List          FINAL IMPRESSION  1. Wheezing    2. Elevated troponin    3. TORIBIO (acute kidney injury) ([facility]    4. Chest tightness        Blood pressure (!) 151/84, pulse 85, temperature 97.4 °F (36.3 °C), temperature source Oral, resp. rate 16, height 5' 8\" (1.727 m), weight 200 lb 12.8 oz (91.1 kg), SpO2 95 %, not currently breastfeeding.      For further details of [facility] emergency department encounter, please see documentation by advanced practice provider,    Pablo Morfin MD (electronically signed)  Attending Emergency Physician Bethany Rebollar MD  49/10/54 [id number]

## 2022-05-19 NOTE — PROGRESS NOTES
[facility]    5/19/2022 9:00 AM        Name: Rosa Riojas . Admitted: 5/17/2022  Primary Care Provider: Alexander Ko MD (Tel: [phone])                        Subjective:  . No acute events overnight. Resting well. Pain control. Diet ok. Labs reviewed  Denies any chest pain sob.      Reviewed interval ancillary notes    Current Medications  [START ON 5/20/2022] methylPREDNISolone sodium (SOLU-MEDROL) injection 40 mg, Daily  insulin lispro (HUMALOG) injection vial 0-18 Units, TID WC  insulin lispro (HUMALOG) injection vial 0-9 Units, Nightly  insulin glargine (LANTUS) injection vial 22 Units, Daily  insulin lispro (HUMALOG) injection vial 5 Units, TID WC  albuterol sulfate  (90 Base) MCG/ACT inhaler 1 puff, Q6H PRN  amiodarone (CORDARONE) tablet 100 mg, Daily  aspirin chewable tablet 81 mg, Daily  calcitRIOL (ROCALTROL) capsule 0.25 mcg, Daily  ipratropium-albuterol (DUONEB) nebulizer solution 3 mL, Q4H PRN  levothyroxine (SYNTHROID) tablet 100 mcg, Daily  magnesium oxide (MAG-OX) tablet 400 mg, Daily  metoprolol succinate (TOPROL XL) extended release tablet 50 mg, Daily  montelukast (SINGULAIR) tablet 10 mg, Nightly  pantoprazole (PROTONIX) tablet 40 mg, QAM AC  dextrose bolus 10% 125 mL, PRN   Or  dextrose bolus 10% 250 mL, PRN  glucagon (rDNA) injection 1 mg, PRN  dextrose 5 % solution, PRN  sodium chloride flush 0.9 % injection 5-40 mL, 2 times per day  sodium chloride flush 0.9 % injection 5-40 mL, PRN  0.9 % sodium chloride infusion, PRN  ondansetron (ZOFRAN-ODT) disintegrating tablet 4 mg, Q8H PRN   Or  ondansetron (ZOFRAN) injection 4 mg, Q6H PRN  polyethylene glycol (GLYCOLAX) packet 17 g, Daily PRN  acetaminophen (TYLENOL) tablet 650 mg, Q6H PRN   Or  acetaminophen (TYLENOL) suppository 650 mg, Q6H PRN  ipratropium-albuterol (DUONEB) nebulizer solution 1 ampule, Q4H WA  doxycycline (VIBRAMYCIN) 100 mg in dextrose 5 % 100 mL IVPB, Q12H  guaiFENesin-dextromethorphan (ROBITUSSIN DM) 100-10 MG/5ML syrup 5 mL, Q4H PRN  haloperidol lactate (HALDOL) injection 2 mg, Once  diphenhydrAMINE (BENADRYL) injection 25 mg, Once  warfarin placeholder: dosing by pharmacy, RX Placeholder  oxyCODONE (ROXICODONE) immediate release tablet 5 mg, Q4H PRN        Objective:  /73   Pulse 89   Temp 97.3 °F (36.3 °C) (Oral)   Resp 18   Ht 5' 8\" (1.727 m)   Wt 200 lb 12.8 oz (91.1 kg)   SpO2 91%   BMI 30.53 kg/m²     Intake/Output Summary (Last 24 hours) at 5/19/2022 0900  Last data filed at 5/19/2022 0849  Gross per 24 hour   Intake 120 ml   Output 450 ml   Net -330 ml      Wt Readings from Last 3 Encounters:   05/19/22 200 lb 12.8 oz (91.1 kg)   05/06/22 206 lb (93.4 kg)   05/06/22 204 lb 4 oz (92.6 kg)       General appearance:  Appears comfortable  Eyes: Sclera clear. Pupils equal.  ENT: Moist oral mucosa. Trachea midline, no adenopathy. Cardiovascular: Regular rhythm, normal S1, S2. No murmur. No edema in lower extremities  Respiratory: Not using accessory muscles. Good inspiratory effort. Clear to auscultation bilaterally, no wheeze or crackles. GI: Abdomen soft, no tenderness, not distended, normal bowel sounds  Musculoskeletal: No cyanosis in digits, neck supple  Neurology: CN 2-12 grossly intact. No speech or motor deficits  Psych: Normal affect.  Alert and oriented in time, place and person  Skin: Warm, dry, normal turgor    Labs and Tests:  CBC:   Recent Labs     05/17/22  1601 05/18/22  0423 05/19/22  0456   WBC 9.1 4.8 10.6   HGB 13.2 12.4 11.3*    227 239     BMP:    Recent Labs     05/17/22  1601 05/18/22  0423 05/19/22  0456   * 132* 131*   K 4.1 4.0 3.9   CL 94* 94* 92*   CO2 27 24 23   BUN 61* 71* 75*   CREATININE 2.6* 2.7* 2.4*   GLUCOSE 175* 441* 351*     Hepatic:   Recent Labs     05/17/22  1601   AST 22   ALT 18 BILITOT 0.8   ALKPHOS 131*       Discussed care with patient             Problem List  Principal Problem:    TORIBIO (acute kidney injury) ([facility] [address]  Resolved Problems:    * No resolved hospital problems. *       Assessment & Plan:   1. Shortness of breath  -Likely multifactorial in setting of possible pneumonia with failed outpatient treatment  -Improving.  -Continue doxycycline  -Down steroids to once daily      Acute on chronic kidney disease  -Creatinine is improved to 2.4  -We will monitor closely follows nephrology will be consulted she does have stage IV kidney disease as well   -Depending on nephrology recommendation we will restart diuretic    CHF looks euvolemic    Uncontrolled diabetes  -Blood sugars are at 441 did not get morning Lantus will resume at sliding scale May increase add scheduled prandial dose as well      Atrial fibrillation rate is controlled      Diet: ADULT DIET;  Regular; 5 carb choices (75 gm/meal)  Code:Full Code  DVT PPX lovenox       Artur Marmolejo MD   5/19/2022 9:00 AM

## 2022-05-19 NOTE — PROGRESS NOTES
I received report from Dona Lira, [facility] and was then assigned to pt's care beginning at 1500. Dona Lira reports that she has notified Dr. Juan Stark of pt's Na being 126, awaiting orders.

## 2022-05-19 NOTE — PROGRESS NOTES
Pharmacy to Dose Warfarin    Pharmacy consulted to dose warfarin for Afib. INR Goal: 2-3    INR today: 2.44    Assessment/Plan:  - INR therapeutic today  - INR was supratherapeutic on admission on home dose and now receiving doxycycline so will dose warfarin 2.5 mg tonight    Pharmacy will continue to follow.     Reyes Birks, PharmD, [facility]  Clinical Pharmacist  [id number]

## 2022-05-19 NOTE — PROGRESS NOTES
Dr. Sandi Adan read message from prior RN regarding Na 126 but have not received any orders yet. I paged on-call nephrologist and left message requesting orders. Update 1840:  Dr. Gumaro Reagan ordered normal saline continuously @ 75/hr and said he thinks that glucose control will improve sodium.

## 2022-05-19 NOTE — PROGRESS NOTES
Pt awake in chair. VSS, assessment complete and medications given. Fresh water provided and pt denies any needs. Daughter at bedside and call light in reach.

## 2022-05-19 NOTE — PROGRESS NOTES
[facility] [facility]   Phone: [phone]    Physical Therapy    [x] Initial Evaluation            [] Daily Treatment Note         [] Discharge Summary      Patient: Chayito De La Cruz   : 1945   MRN: [id number]   Date of Service:  2022  Admitting Diagnosis: TORIBIO (acute kidney injury) [facility])  Current Admission Summary: The pt was admitted with SOB and cough. Pt was diagnosed with bronchitis as well as TORIBIO on CKD. Past Medical History:  has a past medical history of Asthma, Atrial fibrillation ([address] Eosinophilia, Hemoptysis, HIGH CHOLESTEROL, Hx of blood clots, Hypertension, Irregular heart beat, Other specified gastritis without mention of hemorrhage, Palpitations, Skin cancer, and Type II or unspecified type diabetes mellitus without mention of complication, not stated as uncontrolled. Past Surgical History:  has a past surgical history that includes Cholecystectomy;  section; Colonoscopy (2017); skin biopsy; bronchoscopy (2016); Coronary artery bypass graft (2018); Mitral valve replacement (2018); transesophageal echocardiogram (2018); Tunneled venous catheter placement (Left, 2018); Cardiac catheterization (2018); Insertable Cardiac Monitor (Left, 2018); Colonoscopy (2014); Upper gastrointestinal endoscopy (N/A, 10/10/2018); Colonoscopy (10/10/2018); Colonoscopy (N/A, 2020); Pain management procedure (N/A, 2020); Pain management procedure (Bilateral, 2021); and Cardiac catheterization ( and 5/3 2021). Discharge Recommendations: Chayito De La Cruz scored a 23/24 on the AM-PAC short mobility form. At this time, no further PT is recommended upon discharge as the pt is safe to return home with her 's assistance. The pt feels she does not need further PT. Recommend patient returns to prior setting with prior services.        DME Required For Discharge: no DME required at discharge  Precautions/Restrictions: medium fall risk  Weight Bearing Restrictions: no restrictions  [] Right Upper Extremity  [] Left Upper Extremity [] Right Lower Extremity  [] Left Lower Extremity     Required Braces/Orthotics: no braces required   [] Right  [] Left  Positional Restrictions:no positional restrictions    Pre-Admission Information   Lives With: spouse    Type of Home: house  Home Layout: one level  Home Access: 3 step to enter with handrail. Handrails are located on B side. Bathroom Layout: . Comment: walk-in tub  Toilet Height: elevated height  Bathroom Equipment: . Comment: tub has a seat and grab bars  Home Equipment: rollator - 4 wheeled walker, .   Comment: transport chair  Transfer Assistance: Independent without use of device  Ambulation Assistance:Independent without use of device, ambulation distance is limited by SOB   ADL Assistance: requires assistance with bathing and lower body dressing -  dries the patient's legs due to energy conservation, pt is otherwise independent   IADL Assistance: requires assistance with meal prep, requires assistance with laundry, requires assistance with cleaning, requires assistance with gardening - completes due to pt's difficulty due to her back pain and heart condition  Active :        [x] Yes  [] No (pt does not drive often)  Hand Dominance: [] Left  [] Right  Current Employment: unemployed  Hobbies: read, watch TV, stays home  Recent Falls: one fall in 2022 at a graduation party over a doorjam, the pt has had orthopedic surgery (or neurosurgery) evaluate her spine, she has completed cardiac/pulmonary rehab in the past     Examination   Vision:   Vision Corrective Device: wears glasses for reading  Hearing:   WFL    Posture:   Good  Sensation:   WFL  Proprioception:    WFL  Tone:   Normotonic  Coordination Testing:   WFL    ROM:   (B) LE AROM WFL  Strength:   (B) LE strength grossly WFL  Decision Making: low complexity  Clinical Presentation: stable      Subjective  General: The pt was seated in a chair upon PT arrival, no alarm as the pt is ambulating independently in the room. Pt stated she has chronic back pain as well as SOB with activity. Pt feels she has adequate help at home for Dc. Pain: 0/10  Pain Interventions: not applicable  Vitals  Pulse:  from rest to after ambulation  SpO2: 96 % -before and after ambulation    Functional Mobility    Transfers  Sit to stand transfer: modified independent  Stand to sit transfer: modified independent  Comments:  Ambulation  Assistive Device: no device  Assistance: Independent  Distance: 120'   Gait Mechanics: slow obie with decreased step length  Comments:      Balance  Static Sitting Balance: good: independent with functional balance in unsupported position  Dynamic Sitting Balance: good: independent with functional balance in unsupported position  Static Standing Balance: good: independent with functional balance in unsupported position  Dynamic Standing Balance: good: independent with functional balance in unsupported position  Comments: Pt was able to reach to her toes in sitting safely, independent with gait with no AD     Other Therapeutic Interventions    Functional Outcomes  AM-PAC Inpatient Mobility Raw Score : 23              Cognition  WFL  Orientation:    alert and oriented x 4  Command Following:   [facility]    Education  Barriers To Learning: none  Patient Education: patient educated on PT role and benefits, plan of care, discharge recommendations  Learning Assessment:  patient verbalizes and demonstrates understanding    Assessment  Activity Tolerance: Pt had audible SOB with gait, pt stated this is close to her baseline level of breathing. Pt is always limited by back pain and SOB. Impairments Requiring Therapeutic Intervention: decreased ADL status, decreased endurance  Prognosis: good  Clinical Assessment: The pt is independent with gait and transfers.   She is limited in ADL and IADLs due to chronic back pain and SOB. The pt is self-motivated to continue increasing her endurance and has good family support. She feels she has no further needs for skilled PT. Safety Interventions: patient left in chair and call light within reach    Plan  Frequency: Eval with same day discharge. No follow up required. Current Treatment Recommendations: not applicable, evaluation completed with same day discharge. Goals  Patient Goals:  To continue ambulating outside with her 4WW to increase her endurance       Therapy Session Time      Individual Group Co-treatment   Time In     0946   Time Out     1011   Minutes     25     Timed Code Treatment Minutes:  10 Minutes  Total Treatment Minutes:  25       Electronically Signed By: Dorothy Fermin PT DPT [id number]

## 2022-05-20 ENCOUNTER — APPOINTMENT (OUTPATIENT)
Dept: CT IMAGING | Age: 77
DRG: 194 | End: 2022-05-20
Payer: MEDICARE

## 2022-05-20 LAB
ALBUMIN SERPL-MCNC: 3.6 G/DL (ref 3.4–5)
ANION GAP SERPL CALCULATED.3IONS-SCNC: 15 MMOL/L (ref 3–16)
BUN BLDV-MCNC: 66 MG/DL (ref 7–20)
CALCIUM SERPL-MCNC: 9 MG/DL (ref 8.3–10.6)
CHLORIDE BLD-SCNC: 97 MMOL/L (ref 99–110)
CO2: 22 MMOL/L (ref 21–32)
CREAT SERPL-MCNC: 1.8 MG/DL (ref 0.6–1.2)
GFR AFRICAN AMERICAN: 33
GFR NON-AFRICAN AMERICAN: 27
GLUCOSE BLD-MCNC: 107 MG/DL (ref 70–99)
GLUCOSE BLD-MCNC: 177 MG/DL (ref 70–99)
GLUCOSE BLD-MCNC: 180 MG/DL (ref 70–99)
GLUCOSE BLD-MCNC: 185 MG/DL (ref 70–99)
GLUCOSE BLD-MCNC: 195 MG/DL (ref 70–99)
GLUCOSE BLD-MCNC: 234 MG/DL (ref 70–99)
INR BLD: 2.47 (ref 0.88–1.12)
PERFORMED ON: ABNORMAL
PHOSPHORUS: 3.3 MG/DL (ref 2.5–4.9)
POTASSIUM SERPL-SCNC: 4.1 MMOL/L (ref 3.5–5.1)
PROTHROMBIN TIME: 29 SEC (ref 9.9–12.7)
SODIUM BLD-SCNC: 134 MMOL/L (ref 136–145)
URINE ELECTROPHORESIS INTERP: NORMAL

## 2022-05-20 PROCEDURE — 85610 PROTHROMBIN TIME: CPT

## 2022-05-20 PROCEDURE — 6370000000 HC RX 637 (ALT 250 FOR IP): Performed by: NURSE PRACTITIONER

## 2022-05-20 PROCEDURE — 2580000003 HC RX 258: Performed by: HOSPITALIST

## 2022-05-20 PROCEDURE — 36415 COLL VENOUS BLD VENIPUNCTURE: CPT

## 2022-05-20 PROCEDURE — 94761 N-INVAS EAR/PLS OXIMETRY MLT: CPT

## 2022-05-20 PROCEDURE — APPNB30 APP NON BILLABLE TIME 0-30 MINS: Performed by: NURSE PRACTITIONER

## 2022-05-20 PROCEDURE — 6360000002 HC RX W HCPCS: Performed by: HOSPITALIST

## 2022-05-20 PROCEDURE — 2500000003 HC RX 250 WO HCPCS: Performed by: HOSPITALIST

## 2022-05-20 PROCEDURE — 2580000003 HC RX 258: Performed by: NURSE PRACTITIONER

## 2022-05-20 PROCEDURE — 80069 RENAL FUNCTION PANEL: CPT

## 2022-05-20 PROCEDURE — 6370000000 HC RX 637 (ALT 250 FOR IP): Performed by: HOSPITALIST

## 2022-05-20 PROCEDURE — 99232 SBSQ HOSP IP/OBS MODERATE 35: CPT | Performed by: CLINICAL NURSE SPECIALIST

## 2022-05-20 PROCEDURE — 74176 CT ABD & PELVIS W/O CONTRAST: CPT

## 2022-05-20 PROCEDURE — 6370000000 HC RX 637 (ALT 250 FOR IP): Performed by: CLINICAL NURSE SPECIALIST

## 2022-05-20 PROCEDURE — 1200000000 HC SEMI PRIVATE

## 2022-05-20 PROCEDURE — 6360000002 HC RX W HCPCS: Performed by: NURSE PRACTITIONER

## 2022-05-20 PROCEDURE — APPSS60 APP SPLIT SHARED TIME 46-60 MINUTES: Performed by: NURSE PRACTITIONER

## 2022-05-20 PROCEDURE — 94640 AIRWAY INHALATION TREATMENT: CPT

## 2022-05-20 RX ORDER — PREDNISONE 20 MG/1
40 TABLET ORAL DAILY
Status: DISCONTINUED | OUTPATIENT
Start: 2022-05-20 | End: 2022-05-20 | Stop reason: DRUGHIGH

## 2022-05-20 RX ORDER — PREDNISONE 20 MG/1
20 TABLET ORAL DAILY
Status: DISCONTINUED | OUTPATIENT
Start: 2022-05-26 | End: 2022-05-24 | Stop reason: HOSPADM

## 2022-05-20 RX ORDER — TORSEMIDE 20 MG/1
40 TABLET ORAL 2 TIMES DAILY
Status: DISCONTINUED | OUTPATIENT
Start: 2022-05-20 | End: 2022-05-21

## 2022-05-20 RX ORDER — PREDNISONE 20 MG/1
40 TABLET ORAL DAILY
Status: COMPLETED | OUTPATIENT
Start: 2022-05-20 | End: 2022-05-22

## 2022-05-20 RX ORDER — PREDNISONE 10 MG/1
10 TABLET ORAL DAILY
Status: DISCONTINUED | OUTPATIENT
Start: 2022-05-29 | End: 2022-05-24 | Stop reason: HOSPADM

## 2022-05-20 RX ORDER — ENOXAPARIN SODIUM 100 MG/ML
30 INJECTION SUBCUTANEOUS DAILY
Status: DISCONTINUED | OUTPATIENT
Start: 2022-05-21 | End: 2022-05-21

## 2022-05-20 RX ADMIN — PANTOPRAZOLE SODIUM 40 MG: 40 TABLET, DELAYED RELEASE ORAL at 06:16

## 2022-05-20 RX ADMIN — ACETAMINOPHEN 650 MG: 325 TABLET ORAL at 11:43

## 2022-05-20 RX ADMIN — IPRATROPIUM BROMIDE AND ALBUTEROL SULFATE 1 AMPULE: 2.5; .5 SOLUTION RESPIRATORY (INHALATION) at 16:11

## 2022-05-20 RX ADMIN — OXYCODONE 5 MG: 5 TABLET ORAL at 18:29

## 2022-05-20 RX ADMIN — DOXYCYCLINE 100 MG: 100 INJECTION, POWDER, LYOPHILIZED, FOR SOLUTION INTRAVENOUS at 11:58

## 2022-05-20 RX ADMIN — ONDANSETRON 4 MG: 2 INJECTION INTRAMUSCULAR; INTRAVENOUS at 05:25

## 2022-05-20 RX ADMIN — IPRATROPIUM BROMIDE AND ALBUTEROL SULFATE 1 AMPULE: 2.5; .5 SOLUTION RESPIRATORY (INHALATION) at 12:30

## 2022-05-20 RX ADMIN — AMIODARONE HYDROCHLORIDE 100 MG: 200 TABLET ORAL at 09:07

## 2022-05-20 RX ADMIN — IPRATROPIUM BROMIDE AND ALBUTEROL SULFATE 1 AMPULE: 2.5; .5 SOLUTION RESPIRATORY (INHALATION) at 08:55

## 2022-05-20 RX ADMIN — OXYCODONE 5 MG: 5 TABLET ORAL at 12:02

## 2022-05-20 RX ADMIN — INSULIN LISPRO 2 UNITS: 100 INJECTION, SOLUTION INTRAVENOUS; SUBCUTANEOUS at 20:20

## 2022-05-20 RX ADMIN — INSULIN LISPRO 5 UNITS: 100 INJECTION, SOLUTION INTRAVENOUS; SUBCUTANEOUS at 11:37

## 2022-05-20 RX ADMIN — LEVOTHYROXINE SODIUM 100 MCG: 0.1 TABLET ORAL at 09:07

## 2022-05-20 RX ADMIN — ASPIRIN 81 MG 81 MG: 81 TABLET ORAL at 09:07

## 2022-05-20 RX ADMIN — PIPERACILLIN AND TAZOBACTAM 3375 MG: 3; .375 INJECTION, POWDER, LYOPHILIZED, FOR SOLUTION INTRAVENOUS at 20:19

## 2022-05-20 RX ADMIN — INSULIN LISPRO 5 UNITS: 100 INJECTION, SOLUTION INTRAVENOUS; SUBCUTANEOUS at 09:09

## 2022-05-20 RX ADMIN — METOPROLOL SUCCINATE 50 MG: 50 TABLET, EXTENDED RELEASE ORAL at 09:07

## 2022-05-20 RX ADMIN — Medication 10 ML: at 20:04

## 2022-05-20 RX ADMIN — SODIUM CHLORIDE: 9 INJECTION, SOLUTION INTRAVENOUS at 20:18

## 2022-05-20 RX ADMIN — CALCITRIOL 0.25 MCG: 0.25 CAPSULE ORAL at 09:07

## 2022-05-20 RX ADMIN — PIPERACILLIN AND TAZOBACTAM 3375 MG: 3; .375 INJECTION, POWDER, LYOPHILIZED, FOR SOLUTION INTRAVENOUS at 13:42

## 2022-05-20 RX ADMIN — Medication 400 MG: at 09:07

## 2022-05-20 RX ADMIN — INSULIN LISPRO 3 UNITS: 100 INJECTION, SOLUTION INTRAVENOUS; SUBCUTANEOUS at 11:37

## 2022-05-20 RX ADMIN — POLYETHYLENE GLYCOL 3350 17 G: 17 POWDER, FOR SOLUTION ORAL at 05:26

## 2022-05-20 RX ADMIN — PREDNISONE 40 MG: 20 TABLET ORAL at 09:21

## 2022-05-20 RX ADMIN — BENZOCAINE 1 LOZENGE: 3.6; 15 LOZENGE ORAL at 13:37

## 2022-05-20 RX ADMIN — IPRATROPIUM BROMIDE AND ALBUTEROL SULFATE 1 AMPULE: 2.5; .5 SOLUTION RESPIRATORY (INHALATION) at 19:15

## 2022-05-20 RX ADMIN — INSULIN LISPRO 6 UNITS: 100 INJECTION, SOLUTION INTRAVENOUS; SUBCUTANEOUS at 09:09

## 2022-05-20 RX ADMIN — INSULIN GLARGINE 22 UNITS: 100 INJECTION, SOLUTION SUBCUTANEOUS at 09:09

## 2022-05-20 RX ADMIN — Medication 10 ML: at 09:09

## 2022-05-20 RX ADMIN — MONTELUKAST SODIUM 10 MG: 10 TABLET, FILM COATED ORAL at 20:03

## 2022-05-20 RX ADMIN — SODIUM CHLORIDE: 9 INJECTION, SOLUTION INTRAVENOUS at 11:57

## 2022-05-20 RX ADMIN — TORSEMIDE 40 MG: 20 TABLET ORAL at 13:37

## 2022-05-20 RX ADMIN — TORSEMIDE 40 MG: 20 TABLET ORAL at 20:03

## 2022-05-20 ASSESSMENT — PAIN DESCRIPTION - LOCATION
LOCATION: HEAD
LOCATION: BACK

## 2022-05-20 ASSESSMENT — PAIN SCALES - GENERAL
PAINLEVEL_OUTOF10: 1
PAINLEVEL_OUTOF10: 0

## 2022-05-20 NOTE — PROGRESS NOTES
Pt has severe, chronic back pain that she reports has been exacerbated today. I provided prn roxicodone at noon today, prior to gen sx recommended we minimize the use of narcotics given her GI state. At 1800, pt began reporting that back pain had returned to severe. She refused ice or heat as she said neither helps. I offered to get lidocaine patch ordered but she said lidocaine blisters her skin. Olivia Zaragoza for guidance, and d/t pt's morphine 'allergy' and kidney fx r/o NSAIDS, standing order for prn rebeca will be admin. Administered dose, daughter at bedside giving pt a back massage - emotional support provided by this RN.

## 2022-05-20 NOTE — PROGRESS NOTES
Physician Progress Note      [facility]                  [id number]  :                       1945  ADMIT DATE:       2022 3:10 PM  DISCH DATE:  RESPONDING  PROVIDER #:        Aimee Lutz MD          QUERY TEXT:    Patient admitted with Shortness of breath likely multifactorial in setting of   possible pneumonia. Noted documentation of Acute Kidney Injury in    Nephrology consult note. In order to support the diagnosis of TORIBIO, please   include additional clinical indicators in your documentation. ? Or please   document if the diagnosis of TORIBIO has been ruled out after further study. The medical record reflects the following:  Risk Factors: SCr 2.6  Clinical Indicators:  Nephrology consult note documented TORIBIO: Possibly   prerenal in nature versus ATN. Check fractional excretion sodium  CKD stage IV with a EGFR of 29 follows up with me in office has been   investigated.  Nephrology note documented CKD stage IV with a EGFR of   29. SCr on admission 2.6, SCr 2.7-->2.0 during admission, ED note documented     CMP does show an TORIBIO with a creatinine of 2.6, higher than patient's baseline   with a BUN of 61, the patient does appear dehydrated. Treatment: IV fluids,   Nephrology consult    Defined by Kidney Disease Improving Global Outcomes (KDIGO) clinical practice   guideline for acute kidney injury:  -Increase in SCr by greater than or equal to 0.3 mg/dl within 48 hours; or  -Increase or decrease in SCr to greater than or equal to 1.5 times baseline,   which is known or presumed to have occurred within the prior 7 days; or  -Urine volume < 0.5ml/kg/h for 6 hours. Options provided:  -- Acute kidney injury evidenced by, Please document evidence as well as a   numerical baseline creatinine, if known.   -- Acute kidney injury ruled out after study  -- Other - I will add my own diagnosis  -- Disagree - Not applicable / Not valid  -- Disagree - Clinically unable to determine / Unknown  -- Refer to Clinical Documentation Reviewer    PROVIDER RESPONSE TEXT:    Acute kidney injury was ruled out after study.     Query created by: Luna Clay on 5/19/2022 2:06 PM      Electronically signed by:  Otilia Masterson MD 5/20/2022 11:28 AM

## 2022-05-20 NOTE — PROGRESS NOTES
Q6H PRN   Or  acetaminophen (TYLENOL) suppository 650 mg, Q6H PRN  ipratropium-albuterol (DUONEB) nebulizer solution 1 ampule, Q4H WA  doxycycline (VIBRAMYCIN) 100 mg in dextrose 5 % 100 mL IVPB, Q12H  guaiFENesin-dextromethorphan (ROBITUSSIN DM) 100-10 MG/5ML syrup 5 mL, Q4H PRN  haloperidol lactate (HALDOL) injection 2 mg, Once  diphenhydrAMINE (BENADRYL) injection 25 mg, Once  oxyCODONE (ROXICODONE) immediate release tablet 5 mg, Q4H PRN        Objective:  /69   Pulse 84   Temp 97.4 °F (36.3 °C) (Oral)   Resp 16   Ht 5' 8\" (1.727 m)   Wt 204 lb 3.2 oz (92.6 kg)   SpO2 91%   BMI 31.05 kg/m²     Intake/Output Summary (Last 24 hours) at 5/20/2022 0853  Last data filed at 5/20/2022 0846  Gross per 24 hour   Intake 360 ml   Output --   Net 360 ml      Wt Readings from Last 3 Encounters:   05/20/22 204 lb 3.2 oz (92.6 kg)   05/06/22 206 lb (93.4 kg)   05/06/22 204 lb 4 oz (92.6 kg)       General appearance:  Appears comfortable  Eyes: Sclera clear. Pupils equal.  ENT: Moist oral mucosa. Trachea midline, no adenopathy. Cardiovascular: Regular rhythm, normal S1, S2. No murmur. No edema in lower extremities  Respiratory: Not using accessory muscles. Good inspiratory effort. Clear to auscultation bilaterally, no wheeze or crackles. GI: Abdomen soft, no tenderness, not distended, normal bowel sounds  Musculoskeletal: No cyanosis in digits, neck supple  Neurology: CN 2-12 grossly intact. No speech or motor deficits  Psych: Normal affect.  Alert and oriented in time, place and person  Skin: Warm, dry, normal turgor    Labs and Tests:  CBC:   Recent Labs     05/17/22  1601 05/18/22  0423 05/19/22  0456   WBC 9.1 4.8 10.6   HGB 13.2 12.4 11.3*    227 239     BMP:    Recent Labs     05/19/22  0456 05/19/22  1250 05/20/22  0505   * 126* 134*   K 3.9 3.8 4.1   CL 92* 88* 97*   CO2 23 23 22   BUN 75* 76* 66*   CREATININE 2.4* 2.0* 1.8*   GLUCOSE 351* 366* 180*     Hepatic:   Recent Labs 05/17/22  1601   AST 22   ALT 18   BILITOT 0.8   ALKPHOS 131*       Discussed care with patient             Problem List  Principal Problem:    TORIBIO (acute kidney injury) ([facility] [address]  Active Problems:    Cough    Coronary artery disease due to calcified coronary lesion    Acute kidney injury superimposed on CKD ([facility] [address]    ICD (implantable cardioverter-defibrillator) in place  Resolved Problems:    * No resolved hospital problems. *       Assessment & Plan:   1. Pneumonia  - improving  - swith to oral doxy  - wean steroids to po    Abdominal pain  - new findings  - uncleat etiology  - constipation vs functional pain  - CT abdomen pelvis ordered     Acute on CKD  - creatine is improved to 1.8  - per nephrology    Chronic CHF  - euvolumic  - resume lasix once ok with nephrolgoy    Uncontrolled DM  - improving       Diet: ADULT DIET;  Regular; 5 carb choices (75 gm/meal)  Code:Full Code  DVT PPX lovenox       Edith Herr MD   5/20/2022 8:53 AM Drysol Counseling:  I discussed with the patient the risks of drysol/aluminum chloride including but not limited to skin rash, itching, irritation, burning.

## 2022-05-20 NOTE — CONSULTS
[facility]     Consult                                                     Patient Name: Diane Palm  MRN: [id number]  YOB: 1945  Admission date: 2022  3:10 PM   Date of evaluation: 2022  Primary Care Physician: Keren Mejia MD  Requesting physician: Craig Mcbride MD  Reason for consult: Abdominal pain  History of Present Illness:    Ms. Xuan Amaya is a 77 y.o. female who presents with redness of breath and admitted for pneumonia. Last night she had acute onset sharp constant lower abdominal pain 10 out of 10 with associated bloating and nausea but no vomiting. Denies fevers chills change in appetite weight bowel movements dysuria or other complaints. Last bowel movement was yesterday following laxatives but no bowel movement several days prior. CAT scan shows small bowel pneumatosis mesenteric gas left upper quadrant similar to prior exams with a small amount of mesenteric edema in concerning for inflammation with underlying jejunal diverticulosis. Similar to prior imaging. Surgical history includes cholecystectomy hysterectomy and . Medical history includes chronic renal failure coronary artery disease CHF atrial fibrillation and medical coagulopathy for which she is on Coumadin.   Pain significantly improved from last night but     Past Medical History:        Diagnosis Date    Asthma     Atrial fibrillation (HCC)     Eosinophilia     Hemoptysis     HIGH CHOLESTEROL     Hx of blood clots     Hypertension     Irregular heart beat     Other specified gastritis without mention of hemorrhage     Palpitations     Skin cancer     basal and squamous    Type II or unspecified type diabetes mellitus without mention of complication, not stated as uncontrolled        Past Surgical History:        Procedure Laterality Date    BRONCHOSCOPY  2016    Dr. Scotty Heath - brushings from [address] [place] [address],[place] [address] 2018    Dr. Yuni Mason and 5/3 2021    Cardiac cath, cardioversion and rafat     SECTION      CHOLECYSTECTOMY      COLONOSCOPY  2017    Dr. Lasha Canela - sigmoid diverticulosis, polypectomies x3    COLONOSCOPY  2014    Dr. Lasha Canela - sigmoid diverticulosis, polypectomies x3, internal hemorrhoids    COLONOSCOPY  10/10/2018    w/biopsy performed by Ortiz Mccoy MD at [address] [facility] N/A 2020    COLONOSCOPY DIAGNOSTIC performed by Cristy Galindo MD at [address]  2018    Dr. Vivica Meckel - x3 (LIMA-LAD, L SV-D1-PLV) modified BL MAZE procedure w/obliteration of WAYNE using 45mm AtriClip    INSERTABLE CARDIAC MONITOR Left 2018    Dr. Bronwyn Rodriguez - Pamela Mack # [id number] [employer]    MITRAL VALVE REPLACEMENT  2018    Dr. Vivica Meckel - 27mm Medtronic Cinch tissue valve    PAIN MANAGEMENT PROCEDURE N/A 2020    C6-C7 MIDLINE  EPIDURAL STEROID INJECTION WITH FLUOROSCOPY performed by Fady Miranda MD at [address] Bilateral 2021    BILATERAL T11 TRANSFORAMINAL EPIDURAL STEROID INJECTION WITH FLUOROSCOPY performed by Fady Miranda MD at [address] TRANSESOPHAGEAL ECHOCARDIOGRAM  2018    during CABG/MVR    TUNNELED VENOUS CATHETER PLACEMENT Left 2018    Dr. Tereso Santos -  for HD---since removed    UPPER GASTROINTESTINAL ENDOSCOPY N/A 10/10/2018    w/biopsy performed by Ortiz Mccoy MD at [address] [place] [address]       Scheduled Meds:   predniSONE  40 mg Oral Daily    Followed by   Shine Orlando ON 2022] predniSONE  30 mg Oral Daily    Followed by   Shine Orlando ON 2022] predniSONE  20 mg Oral Daily    Followed by   Shine Orlando ON 2022] predniSONE  10 mg Oral Daily    torsemide  40 mg Oral BID    piperacillin-tazobactam  3,375 mg IntraVENous Q8H    [START ON 2022] enoxaparin  30 mg SubCUTAneous Daily    insulin lispro  0-18 Units SubCUTAneous TID     insulin lispro  0-9 Units SubCUTAneous Nightly    insulin glargine  22 Units SubCUTAneous Daily    insulin lispro  5 Units SubCUTAneous TID     amiodarone  100 mg Oral Daily    aspirin  81 mg Oral Daily    calcitRIOL  0.25 mcg Oral Daily    levothyroxine  100 mcg Oral Daily    magnesium oxide  400 mg Oral Daily    metoprolol succinate  50 mg Oral Daily    montelukast  10 mg Oral Nightly    pantoprazole  40 mg Oral QAM AC    sodium chloride flush  5-40 mL IntraVENous 2 times per day    ipratropium-albuterol  1 ampule Inhalation Q4H WA    haloperidol lactate  2 mg IntraVENous Once    diphenhydrAMINE  25 mg IntraVENous Once     Continuous Infusions:   sodium chloride Stopped (05/19/22 5398)    dextrose      sodium chloride 25 mL/hr at 05/20/22 1157     PRN Meds:.benzocaine-menthol, bisacodyl, albuterol sulfate HFA, ipratropium-albuterol, dextrose bolus **OR** dextrose bolus, glucagon (rDNA), dextrose, sodium chloride flush, sodium chloride, ondansetron **OR** ondansetron, polyethylene glycol, acetaminophen **OR** acetaminophen, guaiFENesin-dextromethorphan, oxyCODONE    Prior to Admission medications    Medication Sig Start Date End Date Taking?  Authorizing Provider   calcitRIOL (ROCALTROL) 0.25 MCG capsule Take 0.25 mcg by mouth daily  5/4/22   Historical Provider, MD   insulin lispro, 1 Unit Dial, (HUMALOG KWIKPEN) 100 UNIT/ML SOPN 5 units plusTID Insulin dosage per Sliding scale-140-199-2 units, 200-249- 3 units, 250-300-4 units, >300 take 5 units, max dose per day of 50 units 5/6/22   Jeanette Wheeler MD   predniSONE (DELTASONE) 10 MG tablet TAKE 1 TABLET AS NEEDED (EOSINOPHILIC GASATRITIS)  Patient taking differently: as needed TAKE 1 TABLET AS NEEDED (EOSINOPHILIC GASATRITIS) 8/8/20   Jeanette Wheeler MD   metOLazone (ZAROXOLYN) 2.5 MG tablet TAKE 1 TABLET ON TUESDAY AND FRIDAY AND AS DIRECTED 5/3/22   SOLEDAD Johnson - CNS   spironolactone (ALDACTONE) 25 MG tablet TAKE 1 TABLET TWICE A DAY 4/21/22   SOLEDAD Hadley   metoprolol succinate (TOPROL XL) 50 MG extended release tablet TAKE 1 TABLET DAILY 4/20/22   Jeanette Wheeler MD   vitamin D (ERGOCALCIFEROL) 1.25 MG (75509 UT) CAPS capsule TAKE ONE CAPSULE BY MOUTH ONCE WEEKLY 3/24/22   SOLEDAD Mariee CNP   amiodarone (CORDARONE) 200 MG tablet TAKE 1 TABLET DAILY  Patient taking differently: Take 100 mg by mouth daily  3/10/22   Helena Simpson MD   torsemide (DEMADEX) 20 MG tablet TAKE 2 TABLETS TWICE A DAY 3/7/22   Jeanette Wheeler MD   nystatin (MYCOSTATIN) 236131 UNIT/ML suspension TAKE ONE TEASPOONFUL BY MOUTH FOUR TIMES A DAY FOR 5 DAYS  Patient not taking: Reported on 5/17/2022 2/22/22   Jeanette Wheeler MD   levothyroxine (SYNTHROID) 100 MCG tablet Take 1 tablet by mouth daily 2/22/22   Jeanette Wheeler MD   blood glucose test strips (FREESTYLE LITE) strip USE FOUR TIMES A DAY 2/15/22   Jeanette Wheeler MD   insulin glargine (LANTUS SOLOSTAR) 100 UNIT/ML injection pen INJECT 20 UNITS IN THE MORNING  Patient taking differently: 22 Units INJECT 20 UNITS IN THE MORNING 11/22/21   Jeanette Wheeler MD   albuterol sulfate  (90 Base) MCG/ACT inhaler USE 2 INHALATIONS EVERY 6 HOURS AS NEEDED FOR WHEEZING 11/19/21   Jeanette Wheeler MD   ipratropium-albuterol (DUONEB) 0.5-2.5 (3) MG/3ML SOLN nebulizer solution Inhale 3 mLs into the lungs every 4 hours as needed for Shortness of Breath 11/15/21   Jeanette Wheeler MD   midodrine (PROAMATINE) 2.5 MG tablet TAKE ONE TABLET BY MOUTH TWICE A DAY  Patient taking differently: as needed  9/16/21   SOLEDAD Hadley   FreeStyle Lancets MISC 1 each by Does not apply route 4 times daily 9/7/21   Jeanette Wheeler MD   Blood Glucose Monitoring Suppl (FREESTYLE LITE) GAETANO 1 Device by Does not apply route 4 times daily    Historical Provider, MD   oxyCODONE (ROXICODONE) 5 MG immediate release tablet Take 0.5 mg by mouth daily. Historical Provider, MD   montelukast (SINGULAIR) 10 MG tablet TAKE 1 TABLET NIGHTLY 8/3/21   Mariam Kinsey MD   potassium chloride (KLOR-CON M) 10 MEQ extended release tablet TAKE 1 TABLET DAILY 8/3/21   Mariam Kinsey MD   warfarin (COUMADIN) 5 MG tablet TAKE 1 TABLET DAILY  Patient taking differently: Review INR prior to administration.   Managed by Dr. Darrion Leslie 8/3/21   Mariam Kinsey MD   Insulin Pen Needle (BD PEN NEEDLE JANNET U/F) 32G X 4 MM MISC USE 1 PEN NEEDLE FOUR TIMES A DAY 6/14/21   Mariam Kinsey MD   magnesium oxide (MAG-OX) 400 MG tablet Take 1 tablet by mouth daily 5/10/21   Yessica Mauricio MD   ACETAMINOPHEN PO Take 500 mg by mouth every 6 hours as needed     Historical Provider, MD   albuterol (PROVENTIL) (2.5 MG/3ML) 0.083% nebulizer solution Take 3 mLs by nebulization every 6 hours as needed for Wheezing 6/2/20   Mariam Kinsey MD   polyethylene glycol (GLYCOLAX) 17 GM/SCOOP powder Take 17 g by mouth daily     Historical Provider, MD   omeprazole (PRILOSEC) 20 MG delayed release capsule Take 20 mg by mouth daily    Historical Provider, MD   ondansetron (ZOFRAN) 4 MG tablet Take 1 tablet by mouth 3 times daily as needed for Nausea or Vomiting 3/26/20   Mariam Kinsey MD   aspirin 81 MG chewable tablet Take 1 tablet by mouth daily 6/7/19   Mariam Kinsey MD   vitamin B-12 500 MCG tablet Take 1 tablet by mouth daily 10/12/18   Sarkis Winston MD        Allergies:  Grpndbkk-uytrygt-qbydua [fluocinolone], Ciprofloxacin, Diovan [valsartan], Flagyl [metronidazole], Metformin and related [metformin and related], Benazepril, Morphine, Saxagliptin, and Levaquin [levofloxacin]    Social History:   Social History     Socioeconomic History    Marital status:      Spouse name: None    Number of children: None    Years of education: None    Highest education level: None   Occupational History    None   Tobacco Use    Smoking status: Never Smoker    Smokeless tobacco: Never Used   Vaping Use    Vaping Use: Never used   Substance and Sexual Activity    Alcohol use: No    Drug use: No    Sexual activity: None   Other Topics Concern    None   Social History Narrative    None     Social Determinants of Health     Financial Resource Strain: Low Risk     Difficulty of Paying Living Expenses: Not hard at all   Food Insecurity: No Food Insecurity    Worried About Running Out of Food in the Last Year: Never true    Jonelle of Food in the Last Year: Never true   Transportation Needs:     Lack of Transportation (Medical): Not on file    Lack of Transportation (Non-Medical):  Not on file   Physical Activity:     Days of Exercise per Week: Not on file    Minutes of Exercise per Session: Not on file   Stress:     Feeling of Stress : Not on file   Social Connections:     Frequency of Communication with Friends and Family: Not on file    Frequency of Social Gatherings with Friends and Family: Not on file    Attends Temple Services: Not on file    Active Member of Clubs or Organizations: Not on file    Attends Club or Organization Meetings: Not on file    Marital Status: Not on file   Intimate Partner Violence:     Fear of Current or Ex-Partner: Not on file    Emotionally Abused: Not on file    Physically Abused: Not on file    Sexually Abused: Not on file   Housing Stability:     Unable to Pay for Housing in the Last Year: Not on file    Number of [place] in the Last Year: Not on file    Unstable Housing in the Last Year: Not on file       Family History:    Family History   Problem Relation Age of Onset    Cancer Father     Asthma Mother     Hypertension Mother     Heart Disease Mother     High Blood Pressure Mother        Review of Systems:  CONSTITUTIONAL:  Negative except as above  HEENT:  negative  RESPIRATORY:  negative  CARDIOVASCULAR:  negative  GASTROINTESTINAL:  negative except as above   GENITOURINARY:  negative  HEMATOLOGIC/LYMPHATIC: negative  NEUROLOGICAL:  Negative      Vital Signs:  Patient Vitals for the past 24 hrs:   BP Temp Temp src Pulse Resp SpO2 Weight   22 1916 -- -- -- -- 18 94 % --   22 1859 -- -- -- -- 16 -- --   22 1715 (!) 108/42 97.6 °F (36.4 °C) Oral 81 16 94 % --   22 1612 -- -- -- -- 18 95 % --   22 1230 -- -- -- -- 18 95 % --   22 1200 132/77 97.4 °F (36.3 °C) Oral 80 16 93 % --   22 0900 127/85 97.5 °F (36.4 °C) Oral 84 18 93 % --   22 0858 -- -- -- -- 18 95 % --   22 0344 133/69 97.4 °F (36.3 °C) Oral 84 16 91 % 204 lb 3.2 oz (92.6 kg)   22 2340 116/62 97.4 °F (36.3 °C) Oral 88 16 92 % --   22 2050 -- -- -- -- 16 95 % --   22 2045 (!) 151/84 97.4 °F (36.3 °C) Oral 85 16 94 % 206 lb 3.2 oz (93.5 kg)      TEMPERATURE HISTORY 24H: Temp (24hrs), Av.5 °F (36.4 °C), Min:97.4 °F (36.3 °C), Max:97.6 °F (36.4 °C)    BLOOD PRESSURE HISTORY: Systolic (30CVR), TZX:442 , Min:108 , UKR:408    Diastolic (39MSI), TIE:49, Min:42, Max:85    Admission Weight: 196 lb (88.9 kg)       Intake/Output Summary (Last 24 hours) at 2022 191  Last data filed at 2022 1710  Gross per 24 hour   Intake 150 ml   Output --   Net 150 ml     Drain/tube Output:         Physical Exam:  Constitutional:  well-developed, well-nourished,   Neurologic: awake, Orientation:  Oriented to person, place, time, follows commands, clear speech, cranial nerves grossly intact  Psychiatric: normal affect, no hallucinations  Eyes:  sclera clear, no visible discharge  Head, ears, nose, mouth, throat: normocepalic, without obvious abnormality, supple, symmetrical, trachea midline, no JVD, mucous membranes moist  Cardiovascular: regular rate and rhythm   Respiratory: clear to auscultation  GI: soft, non-distended, moderate mid abdominal tenderness without peritonitis  Lymph: no palpable lymphadenopathy  Skin: no bruising or bleeding, normal skin color, texture, turgor and no redness, warmth, or swelling    Labs:    CBC:    Recent Labs     05/18/22  0423 05/19/22  0456   WBC 4.8 10.6   HGB 12.4 11.3*   HCT 37.8 35.3*    239     BMP:    Recent Labs     05/19/22  0456 05/19/22  1250 05/20/22  0505   * 126* 134*   K 3.9 3.8 4.1   CL 92* 88* 97*   CO2 23 23 22   BUN 75* 76* 66*   CREATININE 2.4* 2.0* 1.8*   GLUCOSE 351* 366* 180*     Hepatic: No results for input(s): AST, ALT, ALB, BILITOT, ALKPHOS in the last 72 hours. Amylase: No results for input(s): AMYLASE in the last 72 hours. Lipase: No results for input(s): LIPASE in the last 72 hours. Mag:    No results for input(s): MG in the last 72 hours. Phos:     Recent Labs     05/20/22  0505   PHOS 3.3      Coags:   Recent Labs     05/18/22  0423 05/19/22  0457 05/20/22  0505   INR 2.89* 2.44* 2.47*       Imaging:  I have personally reviewed the following films:  Echo limited    Result Date: 5/6/2022  Transthoracic Echocardiography Report (TTE)  Demographics   Patient Name       Kamran Michaels   Date of Study      05/06/2022         Gender              Female   Patient Number     [id number]         Date of Birth       1945   Visit Number       [id number]          Age                 77 year(s)   Accession Number   [id number]         Room Number         OP   Corporate ID       [id number]           Sonographer         Kira Carvalho RVT,                                                            [facility]   Ordering Physician Alan Pierce   Interpreting        Verito Andrade, CNS            Physician  Procedure Type of Study   TTE procedure:ECHOCARDIOGRAM LIMITED. Procedure Date Date: 05/06/2022 Start: 11:27 AM Study Location: [facility] - Echo Lab Technical Quality: Adequate visualization Indications:Congestive heart failure, systolic dysfunction.  Patient Status: Routine Height: 68 inches Weight: 206 pounds BSA: 2.07 m2 BMI: 31.32 kg/m2 BP: 96/55 mmHg  Conclusions   Summary  Technically difficult examination due to visualization and irregular rhythm  Normal left ventricle size. There is moderate concentric left ventricular hypertrophy. Ejection fraction is visually estimated to be 40%. Overall left ventricular systolic function appears moderately reduced. There is akinesis of the apex walls. 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  No evidence of apical thrombus by contrast administration. A bioprosthetic mitral valve is well seated. Visually opens well. Cannot  adequately assess for dysfunction as gradients not obtained. The left atrium is moderately dilated. No pericardial effusion. If clinical concern for mitral prosthesis dysfunction, consider additional  testing. Signature   ------------------------------------------------------------------  Electronically signed by Liu Leahy DO (Interpreting  physician) on 05/06/2022 at 01:21 PM  ------------------------------------------------------------------   Findings   Left Ventricle  Normal left ventricle size. There is moderate concentric left ventricular hypertrophy. Ejection fraction is visually estimated to be 40%. Overall left ventricular systolic function appears moderately. There is akinesis of the apex walls. 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   Mitral Valve  No evidence of mitral regurgitation or stenosis. A bioprosthetic mitral valve is well seated. Left Atrium  The left atrium is moderately dilated. Aortic Valve  There is no significant aortic valve regurgitation. The aortic valve is mildly thickened/calcified. Mild Aortic stenosis. Aorta  The aortic root is normal in size. Right Ventricle  The right ventricle was poorly visualized. Pacer / ICD wire is visualized in the right ventricle. Tricuspid Valve  The tricuspid valve is normal in structure. Right Atrium  The right atrial size is normal.   Pulmonic Valve  The pulmonic valve is not well visualized.    Pericardial Effusion  No pericardial effusion. Pleural Effusion  No pleural effusion. Miscellaneous  The inferior vena cava was not visualized. M-Mode/2D Measurements (cm)   LV Diastolic Dimension: 9.91 cm LV Systolic Dimension: 3.7 cm  LV Septum Diastolic: 5.75 cm  LV PW Diastolic: 2.93 cm        AO Root Dimension: 3.7 cm                                  LA Dimension: 5.6 cm                                  LA Area: 28.7 cm2  LVOT: 2 cm                      LA volume/Index: 106 ml /51 ml/m2  Aorta   Aortic Root: 3.7 cm  LVOT Diameter: 2 cm      CT ABDOMEN PELVIS WO CONTRAST Additional Contrast? None    Result Date: 5/20/2022  EXAMINATION: CT OF THE ABDOMEN AND PELVIS WITHOUT CONTRAST 5/20/2022 9:43 am TECHNIQUE: CT of the abdomen and pelvis was performed without the administration of intravenous contrast. Multiplanar reformatted images are provided for review. Automated exposure control, iterative reconstruction, and/or weight based adjustment of the mA/kV was utilized to reduce the radiation dose to as low as reasonably achievable. COMPARISON: 05/13/2020, 05/07/2021 HISTORY: ORDERING SYSTEM PROVIDED HISTORY: Abdominal pain. TECHNOLOGIST PROVIDED HISTORY: Reason for exam:->Abdominal pain. Additional Contrast?->None Reason for Exam: Abdominal pain. FINDINGS: Lower Chest:  Visualized portion of the lower chest demonstrates no acute abnormality. Organs: The unenhanced liver, spleen, pancreas, adrenal glands and kidneys demonstrate no acute abnormality. Status post cholecystectomy. GI/Bowel: Mild sigmoid diverticulosis. No acutely dilated or inflamed loops of bowel. Appendix not localized. No inflammatory changes at the base of the cecum. Jejunal diverticulosis is again identified. There is extensive small bowel pneumatosis in the anterior abdomen and in the left upper quadrant similar to multiple prior exams dating back to at least 2020. Pelvis:  In the left ovary, there is a low-attenuation 8.5 cm cystic lesion, with slow interval growth previously measuring 6.3 cm in 2020. No pelvic adenopathy. Peritoneum/Retroperitoneum: No free intraperitoneal air in the upper abdomen. Mesenteric gas and pneumatosis of the small bowel as previously described. Mild edema in the small bowel mesentery in the left upper quadrant has developed compared to the exams in 2021. Bones/Soft Tissues: No lytic or blastic osseous lesion. Small bowel pneumatosis and mesenteric gas in the left upper quadrant similar to multiple prior exams. However, there is a small amount of new mesenteric edema concerning for acute inflammation with underlying jejunal diverticulosis. No abscess. No free intraperitoneal air. Slowly enlarging 8.5 cm cystic lesion in the left ovary, previously 6.3 cm in 2020. Nonemergent gynecology follow-up should be considered. XR CHEST PORTABLE    Result Date: 5/17/2022  EXAMINATION: ONE XRAY VIEW OF THE CHEST 5/17/2022 3:40 pm COMPARISON: Chest x-ray dated 12/20/2021 HISTORY: ORDERING SYSTEM PROVIDED HISTORY: SOB TECHNOLOGIST PROVIDED HISTORY: Reason for exam:->SOB Reason for Exam: SOB FINDINGS: Clear lungs. No pleural effusion or pneumothorax. Cardiomediastinal silhouette is enlarged. Status post sternotomy. Left-sided cardiac device with leads in the right atrium and right ventricle. Degenerative disease of the visualized osseous structures. Clear lungs.        Cultures:  Relevant cultures documented under results section     Assessment:  Patient Active Problem List   Diagnosis    Long term current use of anticoagulant    Hypercholesteremia    Generalized osteoarthrosis, involving multiple sites    Eosinophilic gastritis    Paroxysmal SVT (supraventricular tachycardia) (HCC)    B12 deficiency    History of pulmonary embolism    Multiple pulmonary nodules    Asthma    PAF (paroxysmal atrial fibrillation) (HCC)    Hypertension    Coronary artery disease due to calcified coronary lesion    Nonrheumatic mitral valve regurgitation    Goiter    Primary osteoarthritis of both knees    Splenic infarct    Obesity, Class I, BMI 30-34.9    Chronic systolic CHF (congestive heart failure), NYHA class 3 (HCC)    Thoracic degenerative disc disease    Persistent atrial fibrillation (HCC)    S/P MVR (mitral valve repair)    Ischemic cardiomyopathy    Occlusion and stenosis of bilateral carotid arteries    Pneumatosis intestinalis of small intestine    Aortic valve stenosis    Deep vein thrombosis (DVT) of non-extremity vein    Acute on chronic systolic heart failure due to valvular disease (HCC)    Stage 4 chronic kidney disease (HCC)    Hypotension    Acute kidney injury superimposed on CKD (HCC)    Acute on chronic systolic CHF (congestive heart failure) (HCC)    Chronic diastolic heart failure (HCC)    Atherosclerosis of superior mesenteric artery (HCC)    ICD (implantable cardioverter-defibrillator) in place    Type 2 diabetes mellitus with stage 3b chronic kidney disease, with long-term current use of insulin ([address]    Acquired hypothyroidism    Hypercoagulable state, secondary ([address]    TORIBIO (acute kidney injury) ([address]    Cough    Wheezing     Acute jejunal diverticulitis  Chronic small bowel pneumatosis, mesenteric gas  Pneumonia  CHF  CAD, status-post CABG  Diabetes  Acute on chronic kidney disease  Atrial fibrillation, on Coumadin    Plan:  1. Benign and improving exam, does not need emergent exploration unless condition significantly deteriorates. If does not improve, may need repeat imaging. Pneumatosis and mesenteric gas is typically alarming, but a stable finding in this patient. Reasonable to observe  2. Bowel rest, sips and meds only  3. IVF with caution, monitor and replace electrolytes  4. Pain medication and antiemetics as needed with caution as may mask worsening exam  5. Recommend holding PO anticoagulation, heparin gtt or lovenox if needed because of shorter half lives if intervention needed  6. Activity as tolerated  7. Antibiotics with enteric coverage  8. Defer management of remainder of medical comorbidities to primary and consulting teams    This plan was discussed at length with the patient. She was understanding and in agreement with the plan  Thank you for the consult and allowing me to participate in the care of this patient. I look forward to following her this admission    Tushar Hebert MD, FACS  5/20/2022  7:17 PM

## 2022-05-20 NOTE — PROGRESS NOTES
Pharmacy to Dose Warfarin    Pharmacy consulted to dose warfarin for Afib. INR Goal: 2-3    INR today: 2.47    Assessment/Plan:  - INR therapeutic today  - Will continue warfarin 2.5 mg tonight    Pharmacy will continue to follow.     Vasu De La O, PharmD, BCPS  Clinical Pharmacist  [id number]

## 2022-05-20 NOTE — PROGRESS NOTES
10.6 05/19/2022    RBC 4.01 05/19/2022    HGB 11.3 05/19/2022    HCT 35.3 05/19/2022     05/19/2022    MCV 87.9 05/19/2022    MCH 28.3 05/19/2022    MCHC 32.2 05/19/2022    RDW 17.6 05/19/2022    SEGSPCT 64.1 09/02/2020    BANDSPCT 1 01/14/2019    LYMPHOPCT 4.4 05/19/2022    MONOPCT 2.5 05/19/2022    BASOPCT 0.1 05/19/2022    MONOSABS 0.3 05/19/2022    LYMPHSABS 0.5 05/19/2022    EOSABS 0.0 05/19/2022    BASOSABS 0.0 05/19/2022     CMP:    Lab Results   Component Value Date     05/20/2022    K 4.1 05/20/2022    K 4.0 05/18/2022    CL 97 05/20/2022    CO2 22 05/20/2022    BUN 66 05/20/2022    CREATININE 1.8 05/20/2022    GFRAA 33 05/20/2022    AGRATIO 1.1 05/17/2022    LABGLOM 27 05/20/2022    LABGLOM 45 12/06/2018    GLUCOSE 180 05/20/2022    PROT 7.2 05/17/2022    LABALBU 3.6 05/20/2022    CALCIUM 9.0 05/20/2022    BILITOT 0.8 05/17/2022    ALKPHOS 131 05/17/2022    AST 22 05/17/2022    ALT 18 05/17/2022     Magnesium:    Lab Results   Component Value Date    MG 2.30 08/13/2021     Phosphorus:    Lab Results   Component Value Date    PHOS 3.3 05/20/2022     Uric Acid:  No results found for: LABURIC, URICACID  U/A:    Lab Results   Component Value Date    COLORU Yellow 04/28/2022    PROTEINU Negative 04/28/2022    PHUR 7.0 04/28/2022    WBCUA 3 04/15/2021    RBCUA 2 04/15/2021    YEAST neg 02/12/2021    BACTERIA trace 02/12/2021    BACTERIA 1+ 05/11/2020    CLARITYU Clear 04/28/2022    SPECGRAV 1.009 04/28/2022    LEUKOCYTESUR Negative 04/28/2022    UROBILINOGEN 1.0 04/28/2022    BILIRUBINUR Negative 04/28/2022    BLOODU Negative 04/28/2022    GLUCOSEU Negative 04/28/2022           IMPRESSION/RECOMMENDATIONS:      1. TORIBIO :     fractional excretion sodium < 1 , Mary < 20    Cr 1.8  ,eGFR 33  better than baseline     2. CKD stage IV with a EGFR of 29   follows up with me in office has been investigated  The renal function varies with the state of her CHF and it\"s treatment     3.   CHF: Hypervolemic, restart Torsemide, continue to hold metolazone and aldactone     4. Pneumonia on antibiotics    5. Atrial fibrillation which is rate controlled    Thank you for allowing me to participate in the care of this patient. I will continue to follow along. Please call with questions.     Leandra Velasquez MD, MD  5/20/2022  [facility]

## 2022-05-20 NOTE — PROGRESS NOTES
[place]   Daily Progress Note      Admit Date:  5/17/2022    HPI:    Ms. Thelma He is a 77 y.o. female with history of CAD/CABG in 2018, PAF with maze 2018, HTN, HLD, DVT/PE on coumadin, 2 CVs unsuccessful, systolic heart failure with improvement in LVEF    She is admitted for persistent cough/chest congestion pneumonia after 2 failed po rounds of antibiotics. Her weight stays 198-200. Her creat was elevated on admission. Iron studies normal    Subjective:  Patient is being seen for chronic systolic heart failure. There were no acute overnight cardiac events. She is sitting up in the chair with her daughter. Her breathing is better. She complains of abdominal discomfort and is having CT abdomen. She has had obstructions in the past  Creat 2.6-2.4-1.8 sodium 134 weight 196-200-204    Objective:   /85   Pulse 84   Temp 97.5 °F (36.4 °C) (Oral)   Resp 18   Ht 5' 8\" (1.727 m)   Wt 204 lb 3.2 oz (92.6 kg)   SpO2 93%   BMI 31.05 kg/m²       Intake/Output Summary (Last 24 hours) at 5/20/2022 0916  Last data filed at 5/20/2022 0846  Gross per 24 hour   Intake 360 ml   Output --   Net 360 ml          Physical Exam:  General:  Awake, alert, oriented in NAD  Skin:  Warm and dry. No unusual bruising or rash  Neck:  Supple. No JVD or carotid bruit appreciated  Chest:  Normal effort.   Clear bilaterally, no rales, rhonchi or wheezing  Cardiovascular:  RRR, S1/S2, no murmur/gallop/rub  Abdomen:  Soft, distended, +bowel sounds  Extremities:  No edema  Neurological: No focal deficits  Psychological: Normal mood and affect      Medications:    predniSONE  40 mg Oral Daily    Followed by   Ivonne Lane ON 5/23/2022] predniSONE  30 mg Oral Daily    Followed by   Ivonne Lane ON 5/26/2022] predniSONE  20 mg Oral Daily    Followed by   Ivonne Lane ON 5/29/2022] predniSONE  10 mg Oral Daily    warfarin  2.5 mg Oral Daily    insulin lispro  0-18 Units SubCUTAneous TID     insulin lispro  0-9 Units SubCUTAneous Nightly    insulin glargine  22 Units SubCUTAneous Daily    insulin lispro  5 Units SubCUTAneous TID     amiodarone  100 mg Oral Daily    aspirin  81 mg Oral Daily    calcitRIOL  0.25 mcg Oral Daily    levothyroxine  100 mcg Oral Daily    magnesium oxide  400 mg Oral Daily    metoprolol succinate  50 mg Oral Daily    montelukast  10 mg Oral Nightly    pantoprazole  40 mg Oral QAM AC    sodium chloride flush  5-40 mL IntraVENous 2 times per day    ipratropium-albuterol  1 ampule Inhalation Q4H WA    doxycycline (VIBRAMYCIN) IV  100 mg IntraVENous Q12H    haloperidol lactate  2 mg IntraVENous Once    diphenhydrAMINE  25 mg IntraVENous Once      sodium chloride Stopped (05/19/22 2338)    dextrose      sodium chloride 25 mL (05/18/22 2242)       Lab Data:  CBC:   Recent Labs     05/17/22  1601 05/18/22  0423 05/19/22  0456   WBC 9.1 4.8 10.6   HGB 13.2 12.4 11.3*    227 239     BMP:    Recent Labs     05/19/22  0456 05/19/22  1250 05/20/22  0505   * 126* 134*   K 3.9 3.8 4.1   CO2 23 23 22   BUN 75* 76* 66*   CREATININE 2.4* 2.0* 1.8*     INR:    Recent Labs     05/18/22  0423 05/19/22  0457 05/20/22  0505   INR 2.89* 2.44* 2.47*     BNP:    Recent Labs     05/17/22  1601   PROBNP 3,211*         Diagnostics:  Echo 5/6/2022  Summary   Technically difficult examination due to visualization and irregular rhythm   Normal left ventricle size.   There is moderate concentric left ventricular hypertrophy.   Ejection fraction is visually estimated to be 40%.   Overall left ventricular systolic function appears moderately reduced.   There is akinesis of the apex walls. 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   No evidence of apical thrombus by contrast administration.   A bioprosthetic mitral valve is well seated. Visually opens well.  Cannot   adequately assess for dysfunction as gradients not obtained.   The left atrium is moderately dilated.   No pericardial effusion.     JUDE Preliminary 5/4/2021 Dr Cm [place] - area ~ 1.5, opens adequately, not severe aortic stenosis     [facility] 5/3/2021 Dr Lela Mendoza  Artery Findings/Result   LM Normal   LAD 50% mid, 70% mid, competitive flow distal from LIMA   Cx OM1 20% ostial to prox, superior branch mid OM1 50% ostial   RI N/A   S-D-RPL patent   L-LAD patent   RCA 50-60% distal, very heavily calcified   LVEDP 15   AV Peak to peak gradient 36mmHg, valve crossed easily with pigtail. LVG NA      Intervention:         None     Post Cath Dx:       Patent grafts    Echo 4/20/21  Summary   Overall left ventricular systolic function is severely depressed .   Ejection fraction is visually estimated to be 10-15 %. E/e'= 23.2 GLS=-3.4   Moderately dilated left ventricle.   There is severe diffuse hypokinesis.   Indeterminate diastolic function.   A bioprosthetic mitral valve is well seated with peak velocity of 1.59m/s   and a mean gradient of 3mmHg.   The left atrium is moderately dilated.   Mild aortic stenosis with a peak velocity of 2.5m/s and a mean pressure   gradient of 14mmHg.   The aortic valve is thickened/calcified with decreased leaflet mobility   consistent with aortic stenosis.   Mild to moderate tricuspid regurgitation.   Estimated pulmonary artery systolic pressure is mildly to moderately   elevated at 46 mmHg assuming a right atrial pressure of 8 mmHg    Assessment:    1. Cough  2. CAD status post grafts stents 2021  3. ICD in place  4. Chronic systolic heart failure, compensated with improvement in LVEF 10-40%  5. Status post MVR  6. TORIBIO on CKD      Plan:    1. CHF nurse following for diet education, fluid restriction and daily weights  2. Nephrology following  3. Resume diuretics when ok with nephrology (torsemide, aldactone and metolazone)  4. Continue metoprolol 50 mg daily    Discussed with Dr Lisandra Zhang. Will resume torsemide 40 mg twice a day. Discussed with patient who is agreeable with plan of care.      Thank you for allowing me to participate in the care of your patient.     SOLEDAD George - CNS, CNS

## 2022-05-21 LAB
A/G RATIO: 1.3 (ref 1.1–2.2)
ALBUMIN SERPL-MCNC: 3.2 G/DL (ref 3.4–5)
ALP BLD-CCNC: 86 U/L (ref 40–129)
ALT SERPL-CCNC: 18 U/L (ref 10–40)
ANION GAP SERPL CALCULATED.3IONS-SCNC: 12 MMOL/L (ref 3–16)
APTT: 35.4 SEC (ref 26.2–38.6)
AST SERPL-CCNC: 17 U/L (ref 15–37)
BASOPHILS ABSOLUTE: 0 K/UL (ref 0–0.2)
BASOPHILS RELATIVE PERCENT: 0.1 %
BILIRUB SERPL-MCNC: 0.6 MG/DL (ref 0–1)
BUN BLDV-MCNC: 61 MG/DL (ref 7–20)
CALCIUM SERPL-MCNC: 8.7 MG/DL (ref 8.3–10.6)
CHLORIDE BLD-SCNC: 98 MMOL/L (ref 99–110)
CO2: 27 MMOL/L (ref 21–32)
CREAT SERPL-MCNC: 1.9 MG/DL (ref 0.6–1.2)
EOSINOPHILS ABSOLUTE: 0 K/UL (ref 0–0.6)
EOSINOPHILS RELATIVE PERCENT: 0.1 %
GFR AFRICAN AMERICAN: 31
GFR NON-AFRICAN AMERICAN: 26
GLUCOSE BLD-MCNC: 101 MG/DL (ref 70–99)
GLUCOSE BLD-MCNC: 134 MG/DL (ref 70–99)
GLUCOSE BLD-MCNC: 164 MG/DL (ref 70–99)
GLUCOSE BLD-MCNC: 165 MG/DL (ref 70–99)
GLUCOSE BLD-MCNC: 177 MG/DL (ref 70–99)
GLUCOSE BLD-MCNC: 68 MG/DL (ref 70–99)
GLUCOSE BLD-MCNC: 68 MG/DL (ref 70–99)
HCT VFR BLD CALC: 33.6 % (ref 36–48)
HEMOGLOBIN: 10.8 G/DL (ref 12–16)
INR BLD: 2.09 (ref 0.88–1.12)
LACTIC ACID: 0.9 MMOL/L (ref 0.4–2)
LYMPHOCYTES ABSOLUTE: 0.8 K/UL (ref 1–5.1)
LYMPHOCYTES RELATIVE PERCENT: 8.9 %
MAGNESIUM: 2.7 MG/DL (ref 1.8–2.4)
MCH RBC QN AUTO: 28.6 PG (ref 26–34)
MCHC RBC AUTO-ENTMCNC: 32.2 G/DL (ref 31–36)
MCV RBC AUTO: 88.9 FL (ref 80–100)
MONOCYTES ABSOLUTE: 1 K/UL (ref 0–1.3)
MONOCYTES RELATIVE PERCENT: 11.3 %
NEUTROPHILS ABSOLUTE: 7.3 K/UL (ref 1.7–7.7)
NEUTROPHILS RELATIVE PERCENT: 79.6 %
PDW BLD-RTO: 17.9 % (ref 12.4–15.4)
PERFORMED ON: ABNORMAL
PHOSPHORUS: 3.6 MG/DL (ref 2.5–4.9)
PLATELET # BLD: 201 K/UL (ref 135–450)
PMV BLD AUTO: 8.1 FL (ref 5–10.5)
POTASSIUM SERPL-SCNC: 3.5 MMOL/L (ref 3.5–5.1)
PREALBUMIN: 29.6 MG/DL (ref 20–40)
PROTHROMBIN TIME: 24.4 SEC (ref 9.9–12.7)
RBC # BLD: 3.78 M/UL (ref 4–5.2)
SODIUM BLD-SCNC: 137 MMOL/L (ref 136–145)
TOTAL PROTEIN: 5.6 G/DL (ref 6.4–8.2)
TRANSFERRIN: 248 MG/DL (ref 200–360)
WBC # BLD: 9.1 K/UL (ref 4–11)

## 2022-05-21 PROCEDURE — 84466 ASSAY OF TRANSFERRIN: CPT

## 2022-05-21 PROCEDURE — 6370000000 HC RX 637 (ALT 250 FOR IP): Performed by: HOSPITALIST

## 2022-05-21 PROCEDURE — 85025 COMPLETE CBC W/AUTO DIFF WBC: CPT

## 2022-05-21 PROCEDURE — 80053 COMPREHEN METABOLIC PANEL: CPT

## 2022-05-21 PROCEDURE — 83605 ASSAY OF LACTIC ACID: CPT

## 2022-05-21 PROCEDURE — 83735 ASSAY OF MAGNESIUM: CPT

## 2022-05-21 PROCEDURE — 6360000002 HC RX W HCPCS: Performed by: HOSPITALIST

## 2022-05-21 PROCEDURE — 6360000002 HC RX W HCPCS: Performed by: NURSE PRACTITIONER

## 2022-05-21 PROCEDURE — 1200000000 HC SEMI PRIVATE

## 2022-05-21 PROCEDURE — 94761 N-INVAS EAR/PLS OXIMETRY MLT: CPT

## 2022-05-21 PROCEDURE — 85730 THROMBOPLASTIN TIME PARTIAL: CPT

## 2022-05-21 PROCEDURE — 6370000000 HC RX 637 (ALT 250 FOR IP): Performed by: CLINICAL NURSE SPECIALIST

## 2022-05-21 PROCEDURE — 94640 AIRWAY INHALATION TREATMENT: CPT

## 2022-05-21 PROCEDURE — 6370000000 HC RX 637 (ALT 250 FOR IP): Performed by: NURSE PRACTITIONER

## 2022-05-21 PROCEDURE — 84100 ASSAY OF PHOSPHORUS: CPT

## 2022-05-21 PROCEDURE — 99232 SBSQ HOSP IP/OBS MODERATE 35: CPT | Performed by: SURGERY

## 2022-05-21 PROCEDURE — 99233 SBSQ HOSP IP/OBS HIGH 50: CPT | Performed by: NURSE PRACTITIONER

## 2022-05-21 PROCEDURE — 85610 PROTHROMBIN TIME: CPT

## 2022-05-21 PROCEDURE — 84134 ASSAY OF PREALBUMIN: CPT

## 2022-05-21 PROCEDURE — 2580000003 HC RX 258: Performed by: NURSE PRACTITIONER

## 2022-05-21 PROCEDURE — 2580000003 HC RX 258: Performed by: HOSPITALIST

## 2022-05-21 RX ORDER — ENOXAPARIN SODIUM 100 MG/ML
1 INJECTION SUBCUTANEOUS DAILY
Status: DISCONTINUED | OUTPATIENT
Start: 2022-05-22 | End: 2022-05-24 | Stop reason: HOSPADM

## 2022-05-21 RX ORDER — SIMETHICONE 20 MG/.3ML
40 EMULSION ORAL EVERY 6 HOURS PRN
Status: DISCONTINUED | OUTPATIENT
Start: 2022-05-21 | End: 2022-05-24 | Stop reason: HOSPADM

## 2022-05-21 RX ORDER — BISACODYL 10 MG
10 SUPPOSITORY, RECTAL RECTAL DAILY PRN
Status: DISCONTINUED | OUTPATIENT
Start: 2022-05-21 | End: 2022-05-24 | Stop reason: HOSPADM

## 2022-05-21 RX ADMIN — PIPERACILLIN AND TAZOBACTAM 3375 MG: 3; .375 INJECTION, POWDER, LYOPHILIZED, FOR SOLUTION INTRAVENOUS at 13:13

## 2022-05-21 RX ADMIN — AMIODARONE HYDROCHLORIDE 100 MG: 200 TABLET ORAL at 09:27

## 2022-05-21 RX ADMIN — PIPERACILLIN AND TAZOBACTAM 3375 MG: 3; .375 INJECTION, POWDER, LYOPHILIZED, FOR SOLUTION INTRAVENOUS at 21:52

## 2022-05-21 RX ADMIN — MONTELUKAST SODIUM 10 MG: 10 TABLET, FILM COATED ORAL at 21:49

## 2022-05-21 RX ADMIN — CALCITRIOL 0.25 MCG: 0.25 CAPSULE ORAL at 09:28

## 2022-05-21 RX ADMIN — SODIUM CHLORIDE: 9 INJECTION, SOLUTION INTRAVENOUS at 21:50

## 2022-05-21 RX ADMIN — Medication 400 MG: at 09:28

## 2022-05-21 RX ADMIN — INSULIN LISPRO 3 UNITS: 100 INJECTION, SOLUTION INTRAVENOUS; SUBCUTANEOUS at 16:55

## 2022-05-21 RX ADMIN — PANTOPRAZOLE SODIUM 40 MG: 40 TABLET, DELAYED RELEASE ORAL at 06:10

## 2022-05-21 RX ADMIN — INSULIN LISPRO 5 UNITS: 100 INJECTION, SOLUTION INTRAVENOUS; SUBCUTANEOUS at 16:59

## 2022-05-21 RX ADMIN — INSULIN LISPRO 5 UNITS: 100 INJECTION, SOLUTION INTRAVENOUS; SUBCUTANEOUS at 13:11

## 2022-05-21 RX ADMIN — OXYCODONE 5 MG: 5 TABLET ORAL at 13:12

## 2022-05-21 RX ADMIN — POLYETHYLENE GLYCOL 3350 17 G: 17 POWDER, FOR SOLUTION ORAL at 13:31

## 2022-05-21 RX ADMIN — PIPERACILLIN AND TAZOBACTAM 3375 MG: 3; .375 INJECTION, POWDER, LYOPHILIZED, FOR SOLUTION INTRAVENOUS at 06:16

## 2022-05-21 RX ADMIN — METOPROLOL SUCCINATE 50 MG: 50 TABLET, EXTENDED RELEASE ORAL at 09:28

## 2022-05-21 RX ADMIN — ENOXAPARIN SODIUM 30 MG: 30 INJECTION SUBCUTANEOUS at 09:28

## 2022-05-21 RX ADMIN — TORSEMIDE 40 MG: 20 TABLET ORAL at 09:27

## 2022-05-21 RX ADMIN — OXYCODONE 5 MG: 5 TABLET ORAL at 06:09

## 2022-05-21 RX ADMIN — SODIUM CHLORIDE: 9 INJECTION, SOLUTION INTRAVENOUS at 06:15

## 2022-05-21 RX ADMIN — INSULIN GLARGINE 22 UNITS: 100 INJECTION, SOLUTION SUBCUTANEOUS at 09:30

## 2022-05-21 RX ADMIN — INSULIN LISPRO 3 UNITS: 100 INJECTION, SOLUTION INTRAVENOUS; SUBCUTANEOUS at 13:12

## 2022-05-21 RX ADMIN — ASPIRIN 81 MG 81 MG: 81 TABLET ORAL at 09:28

## 2022-05-21 RX ADMIN — PREDNISONE 40 MG: 20 TABLET ORAL at 09:27

## 2022-05-21 RX ADMIN — LEVOTHYROXINE SODIUM 100 MCG: 0.1 TABLET ORAL at 09:28

## 2022-05-21 RX ADMIN — IPRATROPIUM BROMIDE AND ALBUTEROL SULFATE 1 AMPULE: 2.5; .5 SOLUTION RESPIRATORY (INHALATION) at 08:01

## 2022-05-21 ASSESSMENT — PAIN SCALES - GENERAL
PAINLEVEL_OUTOF10: 0
PAINLEVEL_OUTOF10: 7

## 2022-05-21 NOTE — PROGRESS NOTES
05/21/22 1200   RT Protocol   History Pulmonary Disease 2   Respiratory pattern 0   Breath sounds 0   Cough 0   Bronchodilator Assessment Score 2

## 2022-05-21 NOTE — PROGRESS NOTES
[facility] PROGRESS NOTE    5/21/2022 3:37 PM        Name: Noris Faulkner . Admitted: 5/17/2022  Primary Care Provider: Faby Chavira MD (Tel: [phone])                        Subjective:  . No acute events overnight. Resting well. Pain control. Diet ok. Labs reviewed  Denies any chest pain sob.      Reviewed interval ancillary notes    Current Medications  simethicone (MYLICON) 40 EG/3.5MS drops 40 mg, Q6H PRN  bisacodyl (DULCOLAX) suppository 10 mg, Daily PRN  [START ON 5/22/2022] enoxaparin (LOVENOX) injection 90 mg, Daily  predniSONE (DELTASONE) tablet 40 mg, Daily   Followed by  Chelsea Joiner ON 5/23/2022] predniSONE (DELTASONE) tablet 30 mg, Daily   Followed by  Chelsea Joiner ON 5/26/2022] predniSONE (DELTASONE) tablet 20 mg, Daily   Followed by  Chelsea Joiner ON 5/29/2022] predniSONE (DELTASONE) tablet 10 mg, Daily  piperacillin-tazobactam (ZOSYN) 3,375 mg in dextrose 5 % 50 mL IVPB extended infusion (mini-bag), Q8H  benzocaine-menthol (CEPACOL SORE THROAT) lozenge 1 lozenge, Q2H PRN  0.9 % sodium chloride infusion, Continuous  insulin lispro (HUMALOG) injection vial 0-18 Units, TID WC  insulin lispro (HUMALOG) injection vial 0-9 Units, Nightly  insulin glargine (LANTUS) injection vial 22 Units, Daily  insulin lispro (HUMALOG) injection vial 5 Units, TID   albuterol sulfate  (90 Base) MCG/ACT inhaler 1 puff, Q6H PRN  amiodarone (CORDARONE) tablet 100 mg, Daily  aspirin chewable tablet 81 mg, Daily  calcitRIOL (ROCALTROL) capsule 0.25 mcg, Daily  ipratropium-albuterol (DUONEB) nebulizer solution 3 mL, Q4H PRN  levothyroxine (SYNTHROID) tablet 100 mcg, Daily  magnesium oxide (MAG-OX) tablet 400 mg, Daily  metoprolol succinate (TOPROL XL) extended release tablet 50 mg, Daily  montelukast (SINGULAIR) tablet 10 mg, Nightly  pantoprazole (PROTONIX) tablet 40 mg, QAM AC  dextrose bolus 10% 125 mL, PRN   Or  dextrose bolus 10% 250 mL, PRN  glucagon (rDNA) injection 1 mg, PRN  dextrose 5 % solution, PRN  sodium chloride flush 0.9 % injection 5-40 mL, 2 times per day  sodium chloride flush 0.9 % injection 5-40 mL, PRN  0.9 % sodium chloride infusion, PRN  ondansetron (ZOFRAN-ODT) disintegrating tablet 4 mg, Q8H PRN   Or  ondansetron (ZOFRAN) injection 4 mg, Q6H PRN  polyethylene glycol (GLYCOLAX) packet 17 g, Daily PRN  acetaminophen (TYLENOL) tablet 650 mg, Q6H PRN   Or  acetaminophen (TYLENOL) suppository 650 mg, Q6H PRN  guaiFENesin-dextromethorphan (ROBITUSSIN DM) 100-10 MG/5ML syrup 5 mL, Q4H PRN  haloperidol lactate (HALDOL) injection 2 mg, Once  diphenhydrAMINE (BENADRYL) injection 25 mg, Once  oxyCODONE (ROXICODONE) immediate release tablet 5 mg, Q4H PRN        Objective:  /74   Pulse 80   Temp 97.3 °F (36.3 °C) (Oral)   Resp 18   Ht 5' 8\" (1.727 m)   Wt 204 lb 3.2 oz (92.6 kg)   SpO2 94%   BMI 31.05 kg/m²     Intake/Output Summary (Last 24 hours) at 5/21/2022 1537  Last data filed at 5/21/2022 1513  Gross per 24 hour   Intake 750 ml   Output --   Net 750 ml      Wt Readings from Last 3 Encounters:   05/20/22 204 lb 3.2 oz (92.6 kg)   05/06/22 206 lb (93.4 kg)   05/06/22 204 lb 4 oz (92.6 kg)       General appearance:  Appears comfortable  Eyes: Sclera clear. Pupils equal.  ENT: Moist oral mucosa. Trachea midline, no adenopathy. Cardiovascular: Regular rhythm, normal S1, S2. No murmur. No edema in lower extremities  Respiratory: Not using accessory muscles. Good inspiratory effort. Clear to auscultation bilaterally, no wheeze or crackles. GI: Abdomen soft, no tenderness, not distended, normal bowel sounds  Musculoskeletal: No cyanosis in digits, neck supple  Neurology: CN 2-12 grossly intact. No speech or motor deficits  Psych: Normal affect.  Alert and oriented in time, place and person  Skin: Warm, dry, normal turgor    Labs and Tests:  CBC:   Recent Labs 05/19/22  0456 05/21/22  0448   WBC 10.6 9.1   HGB 11.3* 10.8*    201     BMP:    Recent Labs     05/19/22  1250 05/20/22  0505 05/21/22  0448   * 134* 137   K 3.8 4.1 3.5   CL 88* 97* 98*   CO2 23 22 27   BUN 76* 66* 61*   CREATININE 2.0* 1.8* 1.9*   GLUCOSE 366* 180* 177*     Hepatic:   Recent Labs     05/21/22  0448   AST 17   ALT 18   BILITOT 0.6   ALKPHOS 86       Discussed care with patient             Problem List  Principal Problem:    TORIBIO (acute kidney injury) ([facility] [address]  Active Problems:    Cough    Wheezing    Coronary artery disease due to calcified coronary lesion    Pneumatosis intestinalis    Acute kidney injury superimposed on CKD ([facility] [address]    ICD (implantable cardioverter-defibrillator) in place  Resolved Problems:    * No resolved hospital problems. *       Assessment & Plan:   1. Pneumonia  - improving  - swith to oral doxy      Abdominal pain  - new findings  -Not a CT scan.  -Appreciate general surgery recommendation we will continue serial abdominal exams for now  -Starting on clear liquid today  -Pain control      Acute on CKD  - creatine is improved to 1.8  - per nephrology    Chronic CHF  - euvolumic  - resume lasix once ok with nephrolgoy    Uncontrolled DM  - improving     Atrial fibrillation  -Rate is controlled. Anticoagulation on hold for now      Diet: ADULT DIET;  Clear Liquid  Code:Full Code  DVT PPX lovenox       Yassine Julian MD   5/21/2022 3:37 PM

## 2022-05-21 NOTE — PROGRESS NOTES
[facility]   Cardiology Progress Note     Date: 5/21/2022  Admit Date: 5/17/2022     Reason for consultation: CHF    Chief Complaint:   Chief Complaint   Patient presents with    Shortness of Breath     Cough and shortness of breath x 8 days, advised by Dr. Kb Obando office to come to emergency room. History of Present Illness: History obtained from patient and medical record. Fady Lane is a 77 y.o. female with a past medical history of HTN, HLD, DM, CAD s/p CABG x 3, S/p bioprosthetic MVR, WAYNE clip (8/2018), afib s/p Maze 2018, CMP (EF25-30%) DVT/PE on warfarin and sCHF follows with HF.  S/p ablation of afib w/ PVI, roofline, posterior, CTI flutter 10/30/2019. S/p ILR.  Hx of Mobitz I AVB and prolonged NM interval. S/p JUDE/CV.  Has been treated with amiodarone stopped in 2018.      Pt admitted with persistent cough and chest congestion. Interval Hx: Today, she is being seen for follow up. Pt is up in the chair. She feels pretty well and denies any cardiac complaints. She is in sinus rhythm with stable BP. Her weight is down (204 -----> 202.7 lbs). Patient seen and examined. Clinical notes reviewed. Telemetry reviewed. No new complaints today. No major events overnight. Denies having chest pain, palpitations, shortness of breath, orthopnea/PND, cough, or dizziness at the time of this visit. Allergies: Allergies   Allergen Reactions    Zfvnsitz-Jngpqgb-Ghysba [Fluocinolone] Shortness Of Breath    Ciprofloxacin Shortness Of Breath    Diovan [Valsartan] Shortness Of Breath    Flagyl [Metronidazole] Shortness Of Breath     Has taken diflucan at home 12/7/15    Metformin And Related [Metformin And Related] Shortness Of Breath    Benazepril      Other reaction(s): Not Recorded    Morphine      Bad reaction. \"makes her feel horrible\".      Saxagliptin      Other reaction(s): Not Recorded    Levaquin [Levofloxacin] Rash     Home Meds:  Prior to Visit Medications Medication Sig Taking?  Authorizing Provider   calcitRIOL (ROCALTROL) 0.25 MCG capsule Take 0.25 mcg by mouth daily   Historical Provider, MD   insulin lispro, 1 Unit Dial, (HUMALOG KWIKPEN) 100 UNIT/ML SOPN 5 units plusTID Insulin dosage per Sliding scale-140-199-2 units, 200-249- 3 units, 250-300-4 units, >300 take 5 units, max dose per day of 50 units  Leigh Ty MD   predniSONE (DELTASONE) 10 MG tablet TAKE 1 TABLET AS NEEDED (EOSINOPHILIC GASATRITIS)  Patient taking differently: as needed TAKE 1 TABLET AS NEEDED (EOSINOPHILIC GASATRITIS)  Leigh Ty MD   metOLazone (ZAROXOLYN) 2.5 MG tablet TAKE 1 TABLET ON TUESDAY AND FRIDAY AND AS DIRECTED  SOLEDAD Cha - CNS   spironolactone (ALDACTONE) 25 MG tablet TAKE 1 TABLET TWICE A DAY  Violeta Palomares APRN - CNS   metoprolol succinate (TOPROL XL) 50 MG extended release tablet TAKE 1 TABLET DAILY  Leigh Ty MD   vitamin D (ERGOCALCIFEROL) 1.25 MG (46517 UT) CAPS capsule TAKE ONE CAPSULE BY MOUTH ONCE WEEKLY  SOLEDAD Pond - CNP   amiodarone (CORDARONE) 200 MG tablet TAKE 1 TABLET DAILY  Patient taking differently: Take 100 mg by mouth daily   Светлана Ascencio MD   torsemide (DEMADEX) 20 MG tablet TAKE 2 TABLETS TWICE A DAY  Leigh Ty MD   nystatin (MYCOSTATIN) 041307 UNIT/ML suspension TAKE ONE TEASPOONFUL BY MOUTH FOUR TIMES A DAY FOR 5 DAYS  Patient not taking: Reported on 5/17/2022  Leigh Ty MD   levothyroxine (SYNTHROID) 100 MCG tablet Take 1 tablet by mouth daily  Leigh Ty MD   blood glucose test strips (FREESTYLE LITE) strip USE FOUR TIMES A DAY  Leigh Ty MD   insulin glargine (LANTUS SOLOSTAR) 100 UNIT/ML injection pen INJECT 20 UNITS IN THE MORNING  Patient taking differently: 22 Units INJECT 20 UNITS IN THE MORNING  Leigh Ty MD   albuterol sulfate  (90 Base) MCG/ACT inhaler USE 2 INHALATIONS EVERY 6 HOURS AS NEEDED FOR WHEEZING  Leigh Ty MD ipratropium-albuterol (DUONEB) 0.5-2.5 (3) MG/3ML SOLN nebulizer solution Inhale 3 mLs into the lungs every 4 hours as needed for Shortness of Breath  Santosh Smith MD   midodrine (PROAMATINE) 2.5 MG tablet TAKE ONE TABLET BY MOUTH TWICE A DAY  Patient taking differently: as needed   Violeta ESCOBAR Giesting, APRN - CNS   FreeStyle Lancets MISC 1 each by Does not apply route 4 times daily  Santosh Smith MD   Blood Glucose Monitoring Suppl (FREESTYLE LITE) GAETANO 1 Device by Does not apply route 4 times daily  Historical Provider, MD   oxyCODONE (ROXICODONE) 5 MG immediate release tablet Take 0.5 mg by mouth daily. Historical Provider, MD   montelukast (SINGULAIR) 10 MG tablet TAKE 1 TABLET NIGHTLY  Santosh Smith MD   potassium chloride (KLOR-CON M) 10 MEQ extended release tablet TAKE 1 TABLET DAILY  Santosh Smith MD   warfarin (COUMADIN) 5 MG tablet TAKE 1 TABLET DAILY  Patient taking differently: Review INR prior to administration.   Managed by Dr. Kym Nolan MD   Insulin Pen Needle (BD PEN NEEDLE JANNET U/F) 32G X 4 MM MISC USE 1 PEN NEEDLE FOUR TIMES A DAY  Santosh Smith MD   magnesium oxide (MAG-OX) 400 MG tablet Take 1 tablet by mouth daily  Can Heath MD   ACETAMINOPHEN PO Take 500 mg by mouth every 6 hours as needed   Historical Provider, MD   albuterol (PROVENTIL) (2.5 MG/3ML) 0.083% nebulizer solution Take 3 mLs by nebulization every 6 hours as needed for Wheezing  Santosh Smith MD   polyethylene glycol (GLYCOLAX) 17 GM/SCOOP powder Take 17 g by mouth daily   Historical Provider, MD   omeprazole (PRILOSEC) 20 MG delayed release capsule Take 20 mg by mouth daily  Historical Provider, MD   ondansetron (ZOFRAN) 4 MG tablet Take 1 tablet by mouth 3 times daily as needed for Nausea or Vomiting  Santosh Smith MD   aspirin 81 MG chewable tablet Take 1 tablet by mouth daily  Santosh Smith MD   vitamin B-12 500 MCG tablet Take 1 tablet by mouth daily  Shamsuddin Brigette Orr MD      Scheduled Meds:   predniSONE  40 mg Oral Daily    Followed by   Regine Medellin ON 5/23/2022] predniSONE  30 mg Oral Daily    Followed by   Regine Medellin ON 5/26/2022] predniSONE  20 mg Oral Daily    Followed by   Regine Medellin ON 5/29/2022] predniSONE  10 mg Oral Daily    torsemide  40 mg Oral BID    piperacillin-tazobactam  3,375 mg IntraVENous Q8H    enoxaparin  30 mg SubCUTAneous Daily    insulin lispro  0-18 Units SubCUTAneous TID WC    insulin lispro  0-9 Units SubCUTAneous Nightly    insulin glargine  22 Units SubCUTAneous Daily    insulin lispro  5 Units SubCUTAneous TID     amiodarone  100 mg Oral Daily    aspirin  81 mg Oral Daily    calcitRIOL  0.25 mcg Oral Daily    levothyroxine  100 mcg Oral Daily    magnesium oxide  400 mg Oral Daily    metoprolol succinate  50 mg Oral Daily    montelukast  10 mg Oral Nightly    pantoprazole  40 mg Oral QAM AC    sodium chloride flush  5-40 mL IntraVENous 2 times per day    ipratropium-albuterol  1 ampule Inhalation Q4H WA    haloperidol lactate  2 mg IntraVENous Once    diphenhydrAMINE  25 mg IntraVENous Once     Continuous Infusions:   sodium chloride 75 mL/hr at 05/21/22 0615    dextrose      sodium chloride 25 mL/hr at 05/20/22 2018     PRN Meds:benzocaine-menthol, bisacodyl, albuterol sulfate HFA, ipratropium-albuterol, dextrose bolus **OR** dextrose bolus, glucagon (rDNA), dextrose, sodium chloride flush, sodium chloride, ondansetron **OR** ondansetron, polyethylene glycol, acetaminophen **OR** acetaminophen, guaiFENesin-dextromethorphan, oxyCODONE     Past Medical History:  Past Medical History:   Diagnosis Date    Asthma     Atrial fibrillation (HCC)     Eosinophilia     Hemoptysis     HIGH CHOLESTEROL     Hx of blood clots     Hypertension     Irregular heart beat     Other specified gastritis without mention of hemorrhage     Palpitations     Skin cancer     basal and squamous    Type II or unspecified type diabetes mellitus without mention of complication, not stated as uncontrolled       Past Surgical History:    has a past surgical history that includes Cholecystectomy;  section; Colonoscopy (2017); skin biopsy; bronchoscopy (2016); Coronary artery bypass graft (2018); Mitral valve replacement (2018); transesophageal echocardiogram (2018); Tunneled venous catheter placement (Left, 2018); Cardiac catheterization (2018); Insertable Cardiac Monitor (Left, 2018); Colonoscopy (2014); Upper gastrointestinal endoscopy (N/A, 10/10/2018); Colonoscopy (10/10/2018); Colonoscopy (N/A, 2020); Pain management procedure (N/A, 2020); Pain management procedure (Bilateral, 2021); and Cardiac catheterization ( and 5/3 2021). Social History:  Reviewed. reports that she has never smoked. She has never used smokeless tobacco. She reports that she does not drink alcohol and does not use drugs. Family History:  Reviewed. family history includes Asthma in her mother; Cancer in her father; Heart Disease in her mother; High Blood Pressure in her mother; Hypertension in her mother. Review of Systems:  · Constitutional: Negative for fever, night sweats, chills, weight changes, or weakness  · Skin: Negative for rash, dry skin, pruritus, bruising, bleeding, blood clots, or changes in skin pigment  · HEENT: Negative for vision changes, ringing in the ears, sore throat, dysphagia, or swollen lymph nodes  · Respiratory: Reviewed in HPI  · Cardiovascular: Reviewed in HPI  · Gastrointestinal: Negative for abdominal pain, N/V/D, constipation, or black/tarry stools  · Genito-Urinary: Negative for dysuria, incontinence, urgency, or hematuria  · Musculoskeletal: Negative for joint swelling, muscle pain, or injuries  · Neurological/Psych: Negative for confusion, seizures, headaches, balance issues or TIA-like symptoms.  No anxiety, depression, or insomnia    Physical Examination:  Vitals:    05/21/22 0815   BP: 108/74   Pulse: 80   Resp: 18   Temp: 97.3 °F (36.3 °C)   SpO2: 94%      In: 630 [P.O.:630]  Out: -    Wt Readings from Last 3 Encounters:   05/20/22 204 lb 3.2 oz (92.6 kg)   05/06/22 206 lb (93.4 kg)   05/06/22 204 lb 4 oz (92.6 kg)       Intake/Output Summary (Last 24 hours) at 5/21/2022 1009  Last data filed at 5/21/2022 0615  Gross per 24 hour   Intake 510 ml   Output --   Net 510 ml       Telemetry: Personally Reviewed  - Sinus rhythm with 1st degree AVB. Occasional PACs  · Constitutional: Cooperative and in no apparent distress, and appears well nourished  · Skin: Warm and pink; no pallor, cyanosis, bruising, or clubbing  · HEENT: Symmetric and normocephalic. PERRL, EOM intact. Conjunctiva pink with clear sclera. Mucus membranes pink and moist. Teeth intact. Thyroid smooth without nodules or goiter. · Cardiovascular: Regular rate and rhythm. S1/S2 present without murmurs, rubs, or gallops. Peripheral pulses 2+, capillary refill < 3 seconds. No elevation of JVP. No peripheral edema  · Respiratory: Respirations symmetric and unlabored. Lungs clear to auscultation bilaterally, no wheezing, crackles, or rhonchi  · Gastrointestinal: Abdomen soft and round. Bowel sounds normoactive in all quadrants without tenderness or masses. · Musculoskeletal: Bilateral upper and lower extremity strength 5/5 with full ROM  · Neurologic/Psych: Awake and orientated to person, place and time. Calm affect, appropriate mood    Pertinent labs, diagnostic, device, and imaging results reviewed as a part of this visit    Labs:    BMP:   Recent Labs     05/19/22  1250 05/20/22  0505 05/21/22  0448   * 134* 137   K 3.8 4.1 3.5   CL 88* 97* 98*   CO2 23 22 27   PHOS  --  3.3 3.6   BUN 76* 66* 61*   CREATININE 2.0* 1.8* 1.9*   MG  --   --  2.70*     Estimated Creatinine Clearance: 30 mL/min (A) (based on SCr of 1.9 mg/dL (H)).    CBC:   Recent Labs     05/19/22  0456 05/21/22  0448   WBC 10.6 9.1   HGB 11.3* 10.8*   HCT 35.3* 33.6*   MCV 87.9 88.9    201     Thyroid:   Lab Results   Component Value Date    TSH 0.96 2022     Lipids:   Lab Results   Component Value Date    CHOL 162 2021    HDL 54 2021    TRIG 89 2021     LFTS:   Lab Results   Component Value Date    ALT 18 2022    AST 17 2022    ALKPHOS 86 2022    PROT 5.6 2022    AGRATIO 1.3 2022    BILITOT 0.6 2022     Cardiac Enzymes:   Lab Results   Component Value Date    TROPONINI 0.04 2022    TROPONINI 0.06 2022    TROPONINI 0.05 2022     Coags:   Lab Results   Component Value Date    PROTIME 24.4 2022    INR 2.09 2022       EC22 (Personally Interpreted)   - NSR with 1st degree AV Block and PAC    ECHO: 22   Technically difficult examination due to visualization and irregular rhythm   Normal left ventricle size. There is moderate concentric left ventricular hypertrophy. Ejection fraction is visually estimated to be 40%. Overall left ventricular systolic function appears moderately reduced. There is akinesis of the apex walls. 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   No evidence of apical thrombus by contrast administration. A bioprosthetic mitral valve is well seated. Visually opens well. Cannot   adequately assess for dysfunction as gradients not obtained. The left atrium is moderately dilated. No pericardial effusion. Cath: 5/3/21  Findings:  Artery   Findings/Result  LM       Normal  LAD     50% mid, 70% mid, competitive flow distal from LIMA  Cx        OM1 20% ostial to prox, superior branch mid OM1 50% ostial  RI         N/A  S-D-RPL          patent  L-LAD  patent  RCA     50-60% distal, very heavily calcified  LVEDP            15  AV       Peak to peak gradient 36mmHg, valve crossed easily with pigtail.   LVG     NA     Intervention:         None     Post Cath Dx:       Patent grafts  JUDE tomorrow to evaluate aortic valve although does not appear severe by cath or TTE    CXR: 5/17/22  Clear lungs.     Problem List:   Patient Active Problem List    Diagnosis Date Noted    Wheezing     Cough     TORIBIO (acute kidney injury) ([address] 05/17/2022    Hypercoagulable state, secondary ([address] 05/06/2022    Acquired hypothyroidism 02/22/2022    Type 2 diabetes mellitus with stage 3b chronic kidney disease, with long-term current use of insulin ([address] 08/17/2021    ICD (implantable cardioverter-defibrillator) in place 07/07/2021    Atherosclerosis of superior mesenteric artery ([address] 06/14/2021    Chronic diastolic heart failure (HCC)     Acute on chronic systolic CHF (congestive heart failure) ([address] 04/26/2021    Acute kidney injury superimposed on CKD (HCC)     Hypotension     Acute on chronic systolic heart failure due to valvular disease ([address] 02/26/2021    Stage 4 chronic kidney disease ([address]     Deep vein thrombosis (DVT) of non-extremity vein 12/15/2020    Aortic valve stenosis 11/03/2020    Pneumatosis intestinalis of small intestine 05/10/2020    Ischemic cardiomyopathy 01/20/2020    Occlusion and stenosis of bilateral carotid arteries 01/20/2020    Persistent atrial fibrillation ([address] 10/30/2019    S/P MVR (mitral valve repair)     Thoracic degenerative disc disease 06/07/2019    Chronic systolic CHF (congestive heart failure), NYHA class 3 ([address] 01/15/2019    Obesity, Class I, BMI 30-34.9     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    Goiter 09/07/2018    Coronary artery disease due to calcified coronary lesion     Nonrheumatic mitral valve regurgitation     PAF (paroxysmal atrial fibrillation) ([address]     Hypertension     Asthma 01/12/2018    Primary osteoarthritis of both knees 03/21/2017    Multiple pulmonary nodules 08/01/2016    History of pulmonary embolism     B12 deficiency 09/08/2014    Paroxysmal SVT (supraventricular tachycardia) ([address] 03/86/7466    Eosinophilic gastritis 22/20/3195    Generalized osteoarthrosis, involving multiple sites 03/25/2013    Long term current use of anticoagulant 12/19/2012    Hypercholesteremia 12/19/2012        Assessment and Plan:     1. Persistent Atrial Fibrillation   - Hx of WAYNE clip (2018); JUDE shows appendage not occluded   - S/p DCCV x2 this admission(failed)       - Currently in NSR with 1st degree   - Continue Toprol XL 50 mg QD              - Due to significant CAD and cardiomyopathy, anti-arrhythmic therapies are limited to amiodarone. I have discussed with patient side effects of amiodarone including but not limited to thyroid disease, discoloration of skin and cornea and pulmonary fibrosis. Patient would like to continue with it.                ~ Continue amiodarone 100 mg daily               ~ TSH 0.96 (4/22); ALT 18, AST 22 (5/22)      - [id number] score:high ; [id number] Vasc score and anticoagulation discussed. High risk for stroke and thromboembolism. Anticoagulation is recommended. Risk of bleeding was discussed.      ~ Coumadin on hold. Resume when ok with general surgery    2. Chronic systolic heart failure (NYHA Class IV)   - Appears fairly compensated               ~ EF 10-15% per echo (11/20)   - Continue with Toprol XL 50 mg QD, torsemide 40 mg BID    ~ Home aldactone and metolazone on hold due to TORIBIO. No ACE/ARB due to CKD           - Monitor I&Os, daily weights               3. ICM              - S/p AICD (7/21)   - Follow up with device clinic as scheduled     4. CAD   - Hx of CABG (2018)   - Stable   - No complaints of angina   - Continue ASA, BB, and statin     5. HTN   - Controlled: Goal <130/80   - Continue current medications     6. TORIBIO on CKD   - Stable, near baseline    ~ Baseline high 1's-low 2's   - Nephrology following    7. Diverticulitis   - On ABX   - NPO, but no plans for surgery at this time   - General surgery following    Multiple medical conditions with risk of decompensation.      All pertinent information and plan of care discussed with the rounding physician. All questions and concerns were addressed to the patient. Alternatives to my treatment were discussed. I have discussed the above stated plan with patient and the nurse. The patient verbalized understanding and agreed with the plan. Thank you for allowing to us to participate in the care of Zuleyma Casarez    The patient was seen for >35 minutes. I reviewed interval history, physical exam, review of data including labs, imaging, development and implementation of treatment plan and coordination of complex care.     Barber Burgos, SOLEDAD-CNP  [facility]   Office: [phone]

## 2022-05-21 NOTE — PROGRESS NOTES
Annel Dela Cruz is a 77 y.o. female patient.     Chief complaint-abdominal    HPI-77year-old female seen in follow-up for abdominal pain  Current Facility-Administered Medications   Medication Dose Route Frequency Provider Last Rate Last Admin    predniSONE (DELTASONE) tablet 40 mg  40 mg Oral Daily Gonzalez Ramey MD   40 mg at 05/21/22 4738    Followed by   Ivon Lopez ON 5/23/2022] predniSONE (DELTASONE) tablet 30 mg  30 mg Oral Daily Cyrus Becker MD        Followed by   Ivon Lopez ON 5/26/2022] predniSONE (DELTASONE) tablet 20 mg  20 mg Oral Daily Cyrus Becker MD        Followed by   Ivon Lopez ON 5/29/2022] predniSONE (DELTASONE) tablet 10 mg  10 mg Oral Daily Gonzalez Ramey MD        piperacillin-tazobactam (ZOSYN) 3,375 mg in dextrose 5 % 50 mL IVPB extended infusion (mini-bag)  3,375 mg IntraVENous Q8H SOLEDAD Humphries CNP   Stopped at 05/21/22 1051    benzocaine-menthol (CEPACOL SORE THROAT) lozenge 1 lozenge  1 lozenge Oral Q2H PRN SOLEDAD Humphries CNP   1 lozenge at 05/20/22 1337    enoxaparin Sodium (LOVENOX) injection 30 mg  30 mg SubCUTAneous Daily Gonzalez Ramey MD   30 mg at 05/21/22 0928    bisacodyl (DULCOLAX) suppository 10 mg  10 mg Rectal Daily PRN Gonzalez Ramey MD   10 mg at 05/19/22 1524    0.9 % sodium chloride infusion   IntraVENous Continuous Gonzalez Ramey MD 75 mL/hr at 05/21/22 0615 New Bag at 05/21/22 0615    insulin lispro (HUMALOG) injection vial 0-18 Units  0-18 Units SubCUTAneous TID WC SOLEDAD Velazquez CNP   3 Units at 05/20/22 1137    insulin lispro (HUMALOG) injection vial 0-9 Units  0-9 Units SubCUTAneous Nightly SOLEDAD Velazquez CNP   2 Units at 05/20/22 2020    insulin glargine (LANTUS) injection vial 22 Units  22 Units SubCUTAneous Daily Cyrus Becker MD   22 Units at 05/21/22 0930    insulin lispro (HUMALOG) injection vial 5 Units  5 Units SubCUTAneous TID  Gonzalez Ramey MD   5 Units at 05/20/22 1137    albuterol sulfate  (90 Base) MCG/ACT inhaler 1 puff  1 puff Inhalation Q6H PRN Gonzalez Ramey MD        amiodarone (CORDARONE) tablet 100 mg  100 mg Oral Daily Gonzalez Ramey MD   100 mg at 05/21/22 2182    aspirin chewable tablet 81 mg  81 mg Oral Daily Gonzalez Ramey MD   81 mg at 05/21/22 2277    calcitRIOL (ROCALTROL) capsule 0.25 mcg  0.25 mcg Oral Daily Gonzalez Ramey MD   0.25 mcg at 05/21/22 2736    ipratropium-albuterol (DUONEB) nebulizer solution 3 mL  1 vial Inhalation Q4H PRN Gonzalez Burden MD        levothyroxine (SYNTHROID) tablet 100 mcg  100 mcg Oral Daily Gonzalez Ramey MD   100 mcg at 05/21/22 0928    magnesium oxide (MAG-OX) tablet 400 mg  400 mg Oral Daily Gonzalez Ramey MD   400 mg at 05/21/22 1714    metoprolol succinate (TOPROL XL) extended release tablet 50 mg  50 mg Oral Daily Gonzalez Ramey MD   50 mg at 05/21/22 0928    montelukast (SINGULAIR) tablet 10 mg  10 mg Oral Nightly Gonzalez Ramey MD   10 mg at 05/20/22 2003    pantoprazole (PROTONIX) tablet 40 mg  40 mg Oral QAM AC Gonzalez Ramey MD   40 mg at 05/21/22 0610    dextrose bolus 10% 125 mL  125 mL IntraVENous PRN Gonzalez Ramey MD        Or    dextrose bolus 10% 250 mL  250 mL IntraVENous PRN Rock Daniel MD        glucagon (rDNA) injection 1 mg  1 mg IntraMUSCular PRN Gonzalez Ramey MD        dextrose 5 % solution  100 mL/hr IntraVENous PRN Gonzalez Ramey MD        sodium chloride flush 0.9 % injection 5-40 mL  5-40 mL IntraVENous 2 times per day Rock Daniel MD   10 mL at 05/20/22 2004    sodium chloride flush 0.9 % injection 5-40 mL  5-40 mL IntraVENous PRN Gonzalez Ramey MD        0.9 % sodium chloride infusion   IntraVENous PRN Rock Daniel MD 25 mL/hr at 05/20/22 2018 New Bag at 05/20/22 2018    ondansetron (ZOFRAN-ODT) disintegrating tablet 4 mg  4 mg Oral Q8H PRN Gonzalez Ramey MD        Or    ondansetron (ZOFRAN) injection 4 mg  4 mg IntraVENous Q6H PRN Gonzalez Ramey MD   4 mg at 05/20/22 0525    polyethylene glycol (GLYCOLAX) packet 17 g  17 g Oral Daily PRN Gonzalez Ramey MD   17 g at 05/20/22 0526    acetaminophen (TYLENOL) tablet 650 mg  650 mg Oral Q6H PRN Gonzalez Ramey MD   650 mg at 05/20/22 1143    Or    acetaminophen (TYLENOL) suppository 650 mg  650 mg Rectal Q6H PRN Angie Dickinson MD        ipratropium-albuterol (DUONEB) nebulizer solution 1 ampule  1 ampule Inhalation Q4H WA Gonzalez Ramey MD   1 ampule at 05/21/22 0801    guaiFENesin-dextromethorphan (ROBITUSSIN DM) 100-10 MG/5ML syrup 5 mL  5 mL Oral Q4H PRN Gonzalez Ramey MD        haloperidol lactate (HALDOL) injection 2 mg  2 mg IntraVENous Once Janedwin Cobb MD        diphenhydrAMINE (BENADRYL) injection 25 mg  25 mg IntraVENous Once Radha Cobb MD        oxyCODONE (ROXICODONE) immediate release tablet 5 mg  5 mg Oral Q4H PRN SOLEDAD Velazquez - CNP   5 mg at 05/21/22 0609     Allergies   Allergen Reactions    Wtoqciog-Klzyhsg-Qyvdqj [Fluocinolone] Shortness Of Breath    Ciprofloxacin Shortness Of Breath    Diovan [Valsartan] Shortness Of Breath    Flagyl [Metronidazole] Shortness Of Breath     Has taken diflucan at home 12/7/15    Metformin And Related [Metformin And Related] Shortness Of Breath    Benazepril      Other reaction(s): Not Recorded    Morphine      Bad reaction. \"makes her feel horrible\".  Saxagliptin      Other reaction(s): Not Recorded    Levaquin [Levofloxacin] Rash     Principal Problem:    TORIBIO (acute kidney injury) ([facility] [address]  Active Problems:    Cough    Wheezing    Coronary artery disease due to calcified coronary lesion    Acute kidney injury superimposed on CKD ([facility] [address]    ICD (implantable cardioverter-defibrillator) in place  Resolved Problems:    * No resolved hospital problems. *    Blood pressure 108/74, pulse 80, temperature 97.3 °F (36.3 °C), temperature source Oral, resp. rate 18, height 5' 8\" (1.727 m), weight 204 lb 3.2 oz (92.6 kg), SpO2 94 %, not currently breastfeeding. Subjective:  Symptoms:  Improved. Diet:  NPO. Activity level: Returning to normal.    Pain:  She complains of pain that is mild. Objective:  General Appearance:  Comfortable. Vital signs: (most recent): Blood pressure 108/74, pulse 80, temperature 97.3 °F (36.3 °C), temperature source Oral, resp. rate 18, height 5' 8\" (1.727 m), weight 204 lb 3.2 oz (92.6 kg), SpO2 94 %, not currently breastfeeding. Output: Producing urine and producing stool. Lungs:  Normal effort and normal respiratory rate. Abdomen: Abdomen is soft and distended. (Mild bilateral lower quadrant abdominal tenderness). Neurological: Patient is alert and oriented to person, place and time. Skin:  Warm and dry. Assessment & Plan 72-year-old female who is seen in follow-up for bilateral lower quadrant abdominal pain. CAT scan of the abdomen and pelvis showed pneumatosis of small bowel loops. She has been treated nonoperatively multiple occasions for similar CAT scan findings. She is not ill-appearing. Her abdomen is mildly distended with mild bilateral lower quadrant tenderness. White blood count is normal.  We will start a clear liquid diet. Mylicon written per patient request.  We will continue to monitor abdominal examination.     Cheryl Loera MD  5/21/2022

## 2022-05-21 NOTE — PLAN OF CARE
Problem: Discharge Planning  Goal: Discharge to home or other facility with appropriate resources  5/21/2022 0320 by Andreea Pena RN  Outcome: Progressing     Problem: Pain  Goal: Verbalizes/displays adequate comfort level or baseline comfort level  5/21/2022 0320 by Andreea Pena RN  Outcome: Progressing       Problem: ABCDS Injury Assessment  Goal: Absence of physical injury  5/21/2022 0320 by Andreea Pena RN  Outcome: Progressing

## 2022-05-21 NOTE — PROGRESS NOTES
[facility]   Nephrology progress Note  [phone]   [facility] [employer]. [facility]           Reason for Consult: CKD     The patient is a 77 y.o. female with significant past medical history of atrial fibrillation, asthma for eosinophilia, hyperlipidemia, hypertension, gastritis, type 2 diabetes mellitus, presented to the ER with progressive shortness of breath over the last week. She been diagnosed as having acute bronchitis and has received antibiotics. However she kept feeling worse and while in the PCPs office she had persistent cough and shortness of breath and was sent to the ER. She also describes sharp chest pain, there is no hemoptysis no productive sputum  She was found to have acute on chronic kidney disease with chronic atrial fibrillation and possible underlying pneumonia  Her serum creatinine was 2.6 with bun of 61. Has early chronic kidney disease stage IV with a EGFR of 29 and a creatinine of 1.7  Denies any problem with urination  She thinks 2 of the plugs she coughed up might of been streaked with blood        Interval History:    5/21/22: has abdominal  pain , dd have a BM yesterday. No nausea/ vomitus      PHYSICAL EXAM:    Vitals:    /74   Pulse 80   Temp 97.3 °F (36.3 °C) (Oral)   Resp 18   Ht 5' 8\" (1.727 m)   Wt 204 lb 3.2 oz (92.6 kg)   SpO2 94%   BMI 31.05 kg/m²   I/O last 3 completed shifts: In: 630 [P.O.:630]  Out: -   No intake/output data recorded. Physical Exam:  Gen:  alert, oriented x 3  PERRL , EOM +  Pallor +, No icterus  JVP not raised   CV: IRRR murmur +, No rub . Lungs:B/ L air entry, Normal breath sounds   Abd: Tender ++,  bowel sounds + , No organomegaly   Ext:  Edema +,no cyanosis  Skin: Warm. No rash  Neuro: no focal deficit.       DATA:    CBC with Differential:    Lab Results   Component Value Date    WBC 9.1 05/21/2022    RBC 3.78 05/21/2022    HGB 10.8 05/21/2022    HCT 33.6 05/21/2022     05/21/2022    MCV 88.9 05/21/2022    MCH 28.6 05/21/2022    MCHC 32.2 05/21/2022    RDW 17.9 05/21/2022    SEGSPCT 64.1 09/02/2020    BANDSPCT 1 01/14/2019    LYMPHOPCT 8.9 05/21/2022    MONOPCT 11.3 05/21/2022    BASOPCT 0.1 05/21/2022    MONOSABS 1.0 05/21/2022    LYMPHSABS 0.8 05/21/2022    EOSABS 0.0 05/21/2022    BASOSABS 0.0 05/21/2022     CMP:    Lab Results   Component Value Date     05/21/2022    K 3.5 05/21/2022    K 4.0 05/18/2022    CL 98 05/21/2022    CO2 27 05/21/2022    BUN 61 05/21/2022    CREATININE 1.9 05/21/2022    GFRAA 31 05/21/2022    AGRATIO 1.3 05/21/2022    LABGLOM 26 05/21/2022    LABGLOM 45 12/06/2018    GLUCOSE 177 05/21/2022    PROT 5.6 05/21/2022    LABALBU 3.2 05/21/2022    CALCIUM 8.7 05/21/2022    BILITOT 0.6 05/21/2022    ALKPHOS 86 05/21/2022    AST 17 05/21/2022    ALT 18 05/21/2022     Magnesium:    Lab Results   Component Value Date    MG 2.70 05/21/2022     Phosphorus:    Lab Results   Component Value Date    PHOS 3.6 05/21/2022     Uric Acid:  No results found for: LABURIC, URICACID  U/A:    Lab Results   Component Value Date    COLORU Yellow 04/28/2022    PROTEINU Negative 04/28/2022    PHUR 7.0 04/28/2022    WBCUA 3 04/15/2021    RBCUA 2 04/15/2021    YEAST neg 02/12/2021    BACTERIA trace 02/12/2021    BACTERIA 1+ 05/11/2020    CLARITYU Clear 04/28/2022    SPECGRAV 1.009 04/28/2022    LEUKOCYTESUR Negative 04/28/2022    UROBILINOGEN 1.0 04/28/2022    BILIRUBINUR Negative 04/28/2022    BLOODU Negative 04/28/2022    GLUCOSEU Negative 04/28/2022           IMPRESSION/RECOMMENDATIONS:      1. TORIBIO :   fractional excretion sodium < 1 , Mary < 20        2. CKD stage IV with a EGFR of 29   Cr 1.9  ,eGFR 33  better than baseline   follows up with me in office has been investigated  The renal function varies with the state of her CHF and it\"s treatment     3. CHF: Hypervolemic, restarted Torsemide 5/20/22  , continue to hold metolazone and aldactone     4. Pneumonia on antibiotics    5.   Atrial fibrillation which is rate controlled  6. Diverticulitis - on antibiotics    Would discontinue diuretics while the acute abdominal condition persists    Thank you for allowing me to participate in the care of this patient. I will continue to follow along. Please call with questions.     Haley Patterson MD, MD  5/21/2022  [facility]

## 2022-05-22 LAB
ALBUMIN SERPL-MCNC: 3 G/DL (ref 3.4–5)
ANION GAP SERPL CALCULATED.3IONS-SCNC: 12 MMOL/L (ref 3–16)
BASOPHILS ABSOLUTE: 0 K/UL (ref 0–0.2)
BASOPHILS RELATIVE PERCENT: 0.1 %
BUN BLDV-MCNC: 51 MG/DL (ref 7–20)
CALCIUM SERPL-MCNC: 8.7 MG/DL (ref 8.3–10.6)
CHLORIDE BLD-SCNC: 100 MMOL/L (ref 99–110)
CO2: 27 MMOL/L (ref 21–32)
CREAT SERPL-MCNC: 1.8 MG/DL (ref 0.6–1.2)
EOSINOPHILS ABSOLUTE: 0 K/UL (ref 0–0.6)
EOSINOPHILS RELATIVE PERCENT: 0.5 %
GFR AFRICAN AMERICAN: 33
GFR NON-AFRICAN AMERICAN: 27
GLUCOSE BLD-MCNC: 121 MG/DL (ref 70–99)
GLUCOSE BLD-MCNC: 147 MG/DL (ref 70–99)
GLUCOSE BLD-MCNC: 212 MG/DL (ref 70–99)
GLUCOSE BLD-MCNC: 263 MG/DL (ref 70–99)
GLUCOSE BLD-MCNC: 329 MG/DL (ref 70–99)
GLUCOSE BLD-MCNC: 57 MG/DL (ref 70–99)
GLUCOSE BLD-MCNC: 62 MG/DL (ref 70–99)
GLUCOSE BLD-MCNC: 90 MG/DL (ref 70–99)
HCT VFR BLD CALC: 32.2 % (ref 36–48)
HEMOGLOBIN: 10.7 G/DL (ref 12–16)
INR BLD: 1.63 (ref 0.88–1.12)
LYMPHOCYTES ABSOLUTE: 0.9 K/UL (ref 1–5.1)
LYMPHOCYTES RELATIVE PERCENT: 12.6 %
MCH RBC QN AUTO: 29.3 PG (ref 26–34)
MCHC RBC AUTO-ENTMCNC: 33.2 G/DL (ref 31–36)
MCV RBC AUTO: 88 FL (ref 80–100)
MONOCYTES ABSOLUTE: 0.7 K/UL (ref 0–1.3)
MONOCYTES RELATIVE PERCENT: 10.4 %
NEUTROPHILS ABSOLUTE: 5.2 K/UL (ref 1.7–7.7)
NEUTROPHILS RELATIVE PERCENT: 76.4 %
PDW BLD-RTO: 17.6 % (ref 12.4–15.4)
PERFORMED ON: ABNORMAL
PERFORMED ON: NORMAL
PHOSPHORUS: 3.6 MG/DL (ref 2.5–4.9)
PLATELET # BLD: 200 K/UL (ref 135–450)
PMV BLD AUTO: 8.6 FL (ref 5–10.5)
POTASSIUM SERPL-SCNC: 3.1 MMOL/L (ref 3.5–5.1)
PROTHROMBIN TIME: 18.8 SEC (ref 9.9–12.7)
RBC # BLD: 3.66 M/UL (ref 4–5.2)
SODIUM BLD-SCNC: 139 MMOL/L (ref 136–145)
WBC # BLD: 6.8 K/UL (ref 4–11)

## 2022-05-22 PROCEDURE — 2580000003 HC RX 258: Performed by: HOSPITALIST

## 2022-05-22 PROCEDURE — 6370000000 HC RX 637 (ALT 250 FOR IP): Performed by: NURSE PRACTITIONER

## 2022-05-22 PROCEDURE — 36415 COLL VENOUS BLD VENIPUNCTURE: CPT

## 2022-05-22 PROCEDURE — 85610 PROTHROMBIN TIME: CPT

## 2022-05-22 PROCEDURE — 6360000002 HC RX W HCPCS: Performed by: NURSE PRACTITIONER

## 2022-05-22 PROCEDURE — 99233 SBSQ HOSP IP/OBS HIGH 50: CPT | Performed by: NURSE PRACTITIONER

## 2022-05-22 PROCEDURE — 6370000000 HC RX 637 (ALT 250 FOR IP): Performed by: HOSPITALIST

## 2022-05-22 PROCEDURE — 85025 COMPLETE CBC W/AUTO DIFF WBC: CPT

## 2022-05-22 PROCEDURE — 6370000000 HC RX 637 (ALT 250 FOR IP): Performed by: INTERNAL MEDICINE

## 2022-05-22 PROCEDURE — 80069 RENAL FUNCTION PANEL: CPT

## 2022-05-22 PROCEDURE — 2580000003 HC RX 258: Performed by: NURSE PRACTITIONER

## 2022-05-22 PROCEDURE — 6360000002 HC RX W HCPCS: Performed by: HOSPITALIST

## 2022-05-22 PROCEDURE — 1200000000 HC SEMI PRIVATE

## 2022-05-22 PROCEDURE — 99232 SBSQ HOSP IP/OBS MODERATE 35: CPT | Performed by: SURGERY

## 2022-05-22 RX ORDER — INSULIN LISPRO 100 [IU]/ML
0-6 INJECTION, SOLUTION INTRAVENOUS; SUBCUTANEOUS NIGHTLY
Status: DISCONTINUED | OUTPATIENT
Start: 2022-05-22 | End: 2022-05-24 | Stop reason: HOSPADM

## 2022-05-22 RX ORDER — POTASSIUM CHLORIDE 20 MEQ/1
40 TABLET, EXTENDED RELEASE ORAL PRN
Status: DISCONTINUED | OUTPATIENT
Start: 2022-05-22 | End: 2022-05-24 | Stop reason: HOSPADM

## 2022-05-22 RX ORDER — POTASSIUM CHLORIDE 7.45 MG/ML
10 INJECTION INTRAVENOUS PRN
Status: DISCONTINUED | OUTPATIENT
Start: 2022-05-22 | End: 2022-05-24 | Stop reason: HOSPADM

## 2022-05-22 RX ORDER — INSULIN GLARGINE 100 [IU]/ML
15 INJECTION, SOLUTION SUBCUTANEOUS DAILY
Status: DISCONTINUED | OUTPATIENT
Start: 2022-05-22 | End: 2022-05-24 | Stop reason: HOSPADM

## 2022-05-22 RX ORDER — INSULIN LISPRO 100 [IU]/ML
0-12 INJECTION, SOLUTION INTRAVENOUS; SUBCUTANEOUS
Status: DISCONTINUED | OUTPATIENT
Start: 2022-05-22 | End: 2022-05-24 | Stop reason: HOSPADM

## 2022-05-22 RX ORDER — TORSEMIDE 20 MG/1
20 TABLET ORAL DAILY
Status: DISCONTINUED | OUTPATIENT
Start: 2022-05-22 | End: 2022-05-24

## 2022-05-22 RX ADMIN — NALOXEGOL OXALATE 25 MG: 25 TABLET, FILM COATED ORAL at 13:02

## 2022-05-22 RX ADMIN — Medication 400 MG: at 09:41

## 2022-05-22 RX ADMIN — DEXTROSE MONOHYDRATE 125 ML: 100 INJECTION, SOLUTION INTRAVENOUS at 02:20

## 2022-05-22 RX ADMIN — SODIUM CHLORIDE: 9 INJECTION, SOLUTION INTRAVENOUS at 21:32

## 2022-05-22 RX ADMIN — OXYCODONE 5 MG: 5 TABLET ORAL at 06:23

## 2022-05-22 RX ADMIN — ASPIRIN 81 MG 81 MG: 81 TABLET ORAL at 09:43

## 2022-05-22 RX ADMIN — PIPERACILLIN AND TAZOBACTAM 3375 MG: 3; .375 INJECTION, POWDER, LYOPHILIZED, FOR SOLUTION INTRAVENOUS at 06:30

## 2022-05-22 RX ADMIN — METOPROLOL SUCCINATE 50 MG: 50 TABLET, EXTENDED RELEASE ORAL at 09:42

## 2022-05-22 RX ADMIN — PANTOPRAZOLE SODIUM 40 MG: 40 TABLET, DELAYED RELEASE ORAL at 06:23

## 2022-05-22 RX ADMIN — ENOXAPARIN SODIUM 90 MG: 100 INJECTION SUBCUTANEOUS at 09:44

## 2022-05-22 RX ADMIN — TORSEMIDE 20 MG: 20 TABLET ORAL at 13:10

## 2022-05-22 RX ADMIN — OXYCODONE 5 MG: 5 TABLET ORAL at 15:17

## 2022-05-22 RX ADMIN — Medication 10 ML: at 09:43

## 2022-05-22 RX ADMIN — INSULIN GLARGINE 15 UNITS: 100 INJECTION, SOLUTION SUBCUTANEOUS at 09:46

## 2022-05-22 RX ADMIN — MONTELUKAST SODIUM 10 MG: 10 TABLET, FILM COATED ORAL at 21:31

## 2022-05-22 RX ADMIN — Medication 10 ML: at 22:07

## 2022-05-22 RX ADMIN — CALCITRIOL 0.25 MCG: 0.25 CAPSULE ORAL at 09:40

## 2022-05-22 RX ADMIN — LEVOTHYROXINE SODIUM 100 MCG: 0.1 TABLET ORAL at 09:41

## 2022-05-22 RX ADMIN — AMIODARONE HYDROCHLORIDE 100 MG: 200 TABLET ORAL at 09:42

## 2022-05-22 RX ADMIN — PIPERACILLIN AND TAZOBACTAM 3375 MG: 3; .375 INJECTION, POWDER, LYOPHILIZED, FOR SOLUTION INTRAVENOUS at 13:03

## 2022-05-22 RX ADMIN — PREDNISONE 40 MG: 20 TABLET ORAL at 09:41

## 2022-05-22 RX ADMIN — INSULIN LISPRO 4 UNITS: 100 INJECTION, SOLUTION INTRAVENOUS; SUBCUTANEOUS at 21:36

## 2022-05-22 RX ADMIN — INSULIN LISPRO 6 UNITS: 100 INJECTION, SOLUTION INTRAVENOUS; SUBCUTANEOUS at 17:43

## 2022-05-22 RX ADMIN — PIPERACILLIN AND TAZOBACTAM 3375 MG: 3; .375 INJECTION, POWDER, LYOPHILIZED, FOR SOLUTION INTRAVENOUS at 21:35

## 2022-05-22 RX ADMIN — SODIUM CHLORIDE: 9 INJECTION, SOLUTION INTRAVENOUS at 06:29

## 2022-05-22 ASSESSMENT — PAIN SCALES - GENERAL: PAINLEVEL_OUTOF10: 6

## 2022-05-22 NOTE — PROGRESS NOTES
Jarvis Barajas is a 77 y.o. female patient.     Chief complaint-abdominal pain    HPI-77year-old female seen in follow-up for abdominal pain  Current Facility-Administered Medications   Medication Dose Route Frequency Provider Last Rate Last Admin    insulin glargine (LANTUS) injection vial 15 Units  15 Units SubCUTAneous Daily Gonzalez Ramey MD   15 Units at 05/22/22 0946    insulin lispro (HUMALOG) injection vial 0-12 Units  0-12 Units SubCUTAneous TID WC Gonzalez Ramey MD        insulin lispro (HUMALOG) injection vial 0-6 Units  0-6 Units SubCUTAneous Nightly Gonzalez Ramey MD        potassium chloride (KLOR-CON M) extended release tablet 40 mEq  40 mEq Oral PRN Shantel Booker MD        Or   Gianni Grimes potassium bicarb-citric acid (EFFER-K) effervescent tablet 40 mEq  40 mEq Oral PRN Gonzalez Ramey MD        Or   Gianni Grimes potassium chloride 10 mEq/100 mL IVPB (Peripheral Line)  10 mEq IntraVENous PRN Gonzalez Ramey MD        naloxegol (MOVANTIK) tablet 25 mg  25 mg Oral QAM Gonzalez Ramey MD        torsemide (DEMADEX) tablet 20 mg  20 mg Oral Daily Tavon Urena MD        simethicone (MYLICON) 40 HN/9.3HH drops 40 mg  40 mg Oral Q6H PRN Alpa Loving MD        bisacodyl (DULCOLAX) suppository 10 mg  10 mg Rectal Daily PRN Alpa Loving MD        enoxaparin (LOVENOX) injection 90 mg  1 mg/kg SubCUTAneous Daily Gonzalez Ramey MD   90 mg at 05/22/22 0944    [START ON 5/23/2022] predniSONE (DELTASONE) tablet 30 mg  30 mg Oral Daily Shantel Booker MD        Followed by   Chucky Ruiz ON 5/26/2022] predniSONE (DELTASONE) tablet 20 mg  20 mg Oral Daily Shantel Booker MD        Followed by   Chucky Ruiz ON 5/29/2022] predniSONE (DELTASONE) tablet 10 mg  10 mg Oral Daily Gonzalez Ramey MD        piperacillin-tazobactam (ZOSYN) 3,375 mg in dextrose 5 % 50 mL IVPB extended infusion (mini-bag)  3,375 mg IntraVENous Q8H Sharrell Laud, APRN - CNP 12.5 mL/hr at 05/22/22 0630 3,375 mg at 05/22/22 0630    benzocaine-menthol (CEPACOL SORE THROAT) lozenge 1 lozenge  1 lozenge Oral Q2H PRN Kevin Saenz, APRN - CNP   1 lozenge at 05/20/22 1337    0.9 % sodium chloride infusion   IntraVENous Continuous Gonzalez Ramey MD   Stopped at 05/19/22 2338    albuterol sulfate  (90 Base) MCG/ACT inhaler 1 puff  1 puff Inhalation Q6H PRN Gonzalez Ramey MD        amiodarone (CORDARONE) tablet 100 mg  100 mg Oral Daily Gonzalez Ramey MD   100 mg at 05/22/22 3068    aspirin chewable tablet 81 mg  81 mg Oral Daily Gonzalez Ramey MD   81 mg at 05/22/22 8604    calcitRIOL (ROCALTROL) capsule 0.25 mcg  0.25 mcg Oral Daily Gonzalez Ramey MD   0.25 mcg at 05/22/22 0940    ipratropium-albuterol (DUONEB) nebulizer solution 3 mL  1 vial Inhalation Q4H PRN Gonzalez Alfaro MD        levothyroxine (SYNTHROID) tablet 100 mcg  100 mcg Oral Daily Gonzalez Ramey MD   100 mcg at 05/22/22 0941    magnesium oxide (MAG-OX) tablet 400 mg  400 mg Oral Daily Gonzalez Ramey MD   400 mg at 05/22/22 0941    metoprolol succinate (TOPROL XL) extended release tablet 50 mg  50 mg Oral Daily Gonzalez Ramey MD   50 mg at 05/22/22 0942    montelukast (SINGULAIR) tablet 10 mg  10 mg Oral Nightly Gonzalez Ramey MD   10 mg at 05/21/22 2149    pantoprazole (PROTONIX) tablet 40 mg  40 mg Oral QAM AC Gonzalez Ramey MD   40 mg at 05/22/22 1336    dextrose bolus 10% 125 mL  125 mL IntraVENous PRN Jenn Mcclure MD   Stopped at 05/22/22 0232    Or    dextrose bolus 10% 250 mL  250 mL IntraVENous PRN Gonzalez Ramey MD        glucagon (rDNA) injection 1 mg  1 mg IntraMUSCular PRN Jenn Mcclure MD        dextrose 5 % solution  100 mL/hr IntraVENous PRN Gonzalez Ramey MD        sodium chloride flush 0.9 % injection 5-40 mL  5-40 mL IntraVENous 2 times per day Jenn Mcclure MD   10 mL at 05/22/22 0943    sodium chloride flush 0.9 % injection 5-40 mL  5-40 mL IntraVENous PRN Gonzalez Ramey MD        0.9 % sodium chloride infusion   IntraVENous PRN Jenn Mcclure MD 25 mL/hr at 05/22/22 0629 [facility] at 05/22/22 0629    ondansetron (ZOFRAN-ODT) disintegrating tablet 4 mg  4 mg Oral Q8H PRN Gonzalez Ramey MD        Or    [facility]) injection 4 mg  4 mg IntraVENous Q6H PRN Gonzalez Ramey MD   4 mg at 05/20/22 0525    polyethylene glycol (GLYCOLAX) packet 17 g  17 g Oral Daily PRN Gonzalez Ramey MD   17 g at 05/21/22 1331    acetaminophen (TYLENOL) tablet 650 mg  650 mg Oral Q6H PRN Gonzalez Ramey MD   650 mg at 05/20/22 1143    Or    acetaminophen (TYLENOL) suppository 650 mg  650 mg Rectal Q6H PRN Gonzalez Ramey MD        guaiFENesin-dextromethorphan (ROBITUSSIN DM) 100-10 MG/5ML syrup 5 mL  5 mL Oral Q4H PRN Gonzalez Ramey MD        haloperidol lactate (HALDOL) injection 2 mg  2 mg IntraVENous Once Ronal Maxwell MD        diphenhydrAMINE (BENADRYL) injection 25 mg  25 mg IntraVENous Once Ronal Maxwell MD        oxyCODONE (ROXICODONE) immediate release tablet 5 mg  5 mg Oral Q4H PRN SOLEDAD Velazquez - CNP   5 mg at 05/22/22 3120     Allergies   Allergen Reactions    Llaxcvgp-Bjighyn-Mrlsay [Fluocinolone] Shortness Of Breath    Ciprofloxacin Shortness Of Breath    Diovan [Valsartan] Shortness Of Breath    Flagyl [Metronidazole] Shortness Of Breath     Has taken diflucan at home 12/7/15    Metformin And Related [Metformin And Related] Shortness Of Breath    Benazepril      Other reaction(s): Not Recorded    Morphine      Bad reaction. \"makes her feel horrible\".  Saxagliptin      Other reaction(s): Not Recorded    Levaquin [Levofloxacin] Rash     Principal Problem:    TORIBIO (acute kidney injury) ([facility] [address]  Active Problems:    Cough    Wheezing    Coronary artery disease due to calcified coronary lesion    Pneumatosis intestinalis    Acute kidney injury superimposed on CKD ([facility] [address]    ICD (implantable cardioverter-defibrillator) in place  Resolved Problems:    * No resolved hospital problems.  *    Blood pressure 113/74, pulse 75, temperature 97.4 °F (36.3 °C), temperature source Oral, resp. rate 18, height 5' 8\" (1.727 m), weight 204 lb 3.2 oz (92.6 kg), SpO2 97 %, not currently breastfeeding. Subjective:  Symptoms:  Improved. Diet:  Dietary issues: Tolerating clear liquid. Activity level: Returning to normal.    Pain:  She complains of pain that is mild. Objective:  General Appearance:  Comfortable. Vital signs: (most recent): Blood pressure 113/74, pulse 75, temperature 97.4 °F (36.3 °C), temperature source Oral, resp. rate 18, height 5' 8\" (1.727 m), weight 204 lb 3.2 oz (92.6 kg), SpO2 97 %, not currently breastfeeding. Output: Producing urine. Lungs:  Normal effort and normal respiratory rate. Abdomen: Abdomen is soft. (Mild abdominal distention. Mild bilateral lower quadrant abdominal tenderness. ). Neurological: Patient is alert and oriented to person, place and time. Skin:  Warm and dry. Assessment & Plan pleasant 69-year-old female seen in follow-up for bilateral lower quadrant abdominal pain. CAT scan of the abdomen and pelvis showed pneumatosis of the small bowel loops. This has been seen on previous CAT scans and treated nonoperatively. She is better today. Abdominal distention and pain have improved. She is tolerating a clear liquid diet. Will advance to general diet. Continue supportive measures. We will continue to follow with you.     Pearl Blackburn MD  5/22/2022

## 2022-05-22 NOTE — PROGRESS NOTES
[facility]    5/22/2022 8:49 AM        Name: Chayito De La Cruz . Admitted: 5/17/2022  Primary Care Provider: Mariam Kinsey MD (Tel: [phone])                        Subjective:  . No acute events overnight. Resting well. Pain control. Diet ok.    Shortness of breath is better  No chronic issues  Reviewed interval ancillary notes    Current Medications  insulin glargine (LANTUS) injection vial 15 Units, Daily  insulin lispro (HUMALOG) injection vial 0-12 Units, TID WC  insulin lispro (HUMALOG) injection vial 0-6 Units, Nightly  potassium chloride (KLOR-CON M) extended release tablet 40 mEq, PRN   Or  potassium bicarb-citric acid (EFFER-K) effervescent tablet 40 mEq, PRN   Or  potassium chloride 10 mEq/100 mL IVPB (Peripheral Line), PRN  naloxegol (MOVANTIK) tablet 25 mg, QAM  simethicone (MYLICON) 40 NQ/1.9WW drops 40 mg, Q6H PRN  bisacodyl (DULCOLAX) suppository 10 mg, Daily PRN  enoxaparin (LOVENOX) injection 90 mg, Daily  predniSONE (DELTASONE) tablet 40 mg, Daily   Followed by  Laura Nielson ON 5/23/2022] predniSONE (DELTASONE) tablet 30 mg, Daily   Followed by  Laura Nielson ON 5/26/2022] predniSONE (DELTASONE) tablet 20 mg, Daily   Followed by  Laura Nielson ON 5/29/2022] predniSONE (DELTASONE) tablet 10 mg, Daily  piperacillin-tazobactam (ZOSYN) 3,375 mg in dextrose 5 % 50 mL IVPB extended infusion (mini-bag), Q8H  benzocaine-menthol (CEPACOL SORE THROAT) lozenge 1 lozenge, Q2H PRN  0.9 % sodium chloride infusion, Continuous  albuterol sulfate  (90 Base) MCG/ACT inhaler 1 puff, Q6H PRN  amiodarone (CORDARONE) tablet 100 mg, Daily  aspirin chewable tablet 81 mg, Daily  calcitRIOL (ROCALTROL) capsule 0.25 mcg, Daily  ipratropium-albuterol (DUONEB) nebulizer solution 3 mL, Q4H PRN  levothyroxine (SYNTHROID) tablet 100 mcg, Daily  magnesium oxide (MAG-OX) tablet 400 mg, Daily  metoprolol succinate (TOPROL XL) extended release tablet 50 mg, Daily  montelukast (SINGULAIR) tablet 10 mg, Nightly  pantoprazole (PROTONIX) tablet 40 mg, QAM AC  dextrose bolus 10% 125 mL, PRN   Or  dextrose bolus 10% 250 mL, PRN  glucagon (rDNA) injection 1 mg, PRN  dextrose 5 % solution, PRN  sodium chloride flush 0.9 % injection 5-40 mL, 2 times per day  sodium chloride flush 0.9 % injection 5-40 mL, PRN  0.9 % sodium chloride infusion, PRN  ondansetron (ZOFRAN-ODT) disintegrating tablet 4 mg, Q8H PRN   Or  ondansetron (ZOFRAN) injection 4 mg, Q6H PRN  polyethylene glycol (GLYCOLAX) packet 17 g, Daily PRN  acetaminophen (TYLENOL) tablet 650 mg, Q6H PRN   Or  acetaminophen (TYLENOL) suppository 650 mg, Q6H PRN  guaiFENesin-dextromethorphan (ROBITUSSIN DM) 100-10 MG/5ML syrup 5 mL, Q4H PRN  haloperidol lactate (HALDOL) injection 2 mg, Once  diphenhydrAMINE (BENADRYL) injection 25 mg, Once  oxyCODONE (ROXICODONE) immediate release tablet 5 mg, Q4H PRN        Objective:  /74   Pulse 75   Temp 97.4 °F (36.3 °C) (Oral)   Resp 18   Ht 5' 8\" (1.727 m)   Wt 204 lb 3.2 oz (92.6 kg)   SpO2 97%   BMI 31.05 kg/m²     Intake/Output Summary (Last 24 hours) at 5/22/2022 0849  Last data filed at 5/21/2022 2147  Gross per 24 hour   Intake 716 ml   Output --   Net 716 ml      Wt Readings from Last 3 Encounters:   05/20/22 204 lb 3.2 oz (92.6 kg)   05/06/22 206 lb (93.4 kg)   05/06/22 204 lb 4 oz (92.6 kg)       General appearance:  Appears comfortable  Eyes: Sclera clear. Pupils equal.  ENT: Moist oral mucosa. Trachea midline, no adenopathy. Cardiovascular: Regular rhythm, normal S1, S2. No murmur. No edema in lower extremities  Respiratory: Not using accessory muscles. Good inspiratory effort. Clear to auscultation bilaterally, no wheeze or crackles.    GI: Abdomen soft, no tenderness, not distended, normal bowel sounds  Musculoskeletal: No cyanosis in digits, neck supple  Neurology: CN 2-12 grossly intact. No speech or motor deficits  Psych: Normal affect. Alert and oriented in time, place and person  Skin: Warm, dry, normal turgor    Labs and Tests:  CBC:   Recent Labs     05/21/22  0448 05/22/22  0540   WBC 9.1 6.8   HGB 10.8* 10.7*    200     BMP:    Recent Labs     05/20/22  0505 05/21/22  0448 05/22/22  0540   * 137 139   K 4.1 3.5 3.1*   CL 97* 98* 100   CO2 22 27 27   BUN 66* 61* 51*   CREATININE 1.8* 1.9* 1.8*   GLUCOSE 180* 177* 57*     Hepatic:   Recent Labs     05/21/22  0448   AST 17   ALT 18   BILITOT 0.6   ALKPHOS 86       Discussed care with patient             Problem List  Principal Problem:    TORIBIO (acute kidney injury) ([facility] [address]  Active Problems:    Cough    Wheezing    Coronary artery disease due to calcified coronary lesion    Pneumatosis intestinalis    Acute kidney injury superimposed on CKD ([facility] [address]    ICD (implantable cardioverter-defibrillator) in place  Resolved Problems:    * No resolved hospital problems. *       Assessment & Plan:   1. Pneumonia  - improving  Continue oral  oxygen        Abdominal pain  - new findings  -Not a CT scan.  -Appreciate general surgery recommendation we will continue serial abdominal exams for now  Pain is controlled. New changes    Acute on CKD  - creatine is improved to 1.8  - per nephrology    Chronic CHF  - euvolumic  - resume lasix once ok with nephrolgoy    Uncontrolled DM  -Blood sugars are lower. We will stop the scheduled prandial dosing cut down to medium sliding scale and cut down Lantus to 15 units for now temporarily until she is able to eat for the    Atrial fibrillation  -Rate is controlled. Anticoagulation on hold for now      Diet: ADULT DIET;  Clear Liquid  Code:Full Code  DVT PPX lovenox       Alex Mckenna MD   5/22/2022 8:49 AM

## 2022-05-22 NOTE — PROGRESS NOTES
[facility]   Nephrology progress Note  [phone]   [facility] [employer]. [facility]           Reason for Consult: CKD     The patient is a 77 y.o. female with significant past medical history of atrial fibrillation, asthma for eosinophilia, hyperlipidemia, hypertension, gastritis, type 2 diabetes mellitus, presented to the ER with progressive shortness of breath over the last week. She been diagnosed as having acute bronchitis and has received antibiotics. However she kept feeling worse and while in the PCPs office she had persistent cough and shortness of breath and was sent to the ER. She also describes sharp chest pain, there is no hemoptysis no productive sputum  She was found to have acute on chronic kidney disease with chronic atrial fibrillation and possible underlying pneumonia  Her serum creatinine was 2.6 with bun of 61. Has early chronic kidney disease stage IV with a EGFR of 29 and a creatinine of 1.7  Denies any problem with urination  She thinks 2 of the plugs she coughed up might of been streaked with blood    Interval History:    5/21/22: has abdominal  pain , did have a BM yesterday. No nausea/ vomitus    5/22/22: Less abdominal pain. No nausea vomiting did have a bowel movement, swelling of the legs, no dysuria    PHYSICAL EXAM:    Vitals:    /74   Pulse 75   Temp 97.4 °F (36.3 °C) (Oral)   Resp 18   Ht 5' 8\" (1.727 m)   Wt 204 lb 3.2 oz (92.6 kg)   SpO2 97%   BMI 31.05 kg/m²   I/O last 3 completed shifts: In: 1196 [P.O.:1196]  Out: -   No intake/output data recorded. Physical Exam:  Gen:  alert, oriented x 3  PERRL , EOM +  Pallor +, No icterus  JVP not raised   CV: IRRR murmur +, No rub . Lungs:B/ L air entry, Normal breath sounds   Abd: Slightly tender,  bowel sounds + , No organomegaly   Ext:  Edema +,no cyanosis  Skin: Warm. No rash  Neuro: no focal deficit.       DATA:    CBC with Differential:    Lab Results   Component Value Date    WBC 6.8 05/22/2022    RBC 3.66 05/22/2022    HGB 10.7 05/22/2022    HCT 32.2 05/22/2022     05/22/2022    MCV 88.0 05/22/2022    MCH 29.3 05/22/2022    MCHC 33.2 05/22/2022    RDW 17.6 05/22/2022    SEGSPCT 64.1 09/02/2020    BANDSPCT 1 01/14/2019    LYMPHOPCT 12.6 05/22/2022    MONOPCT 10.4 05/22/2022    BASOPCT 0.1 05/22/2022    MONOSABS 0.7 05/22/2022    LYMPHSABS 0.9 05/22/2022    EOSABS 0.0 05/22/2022    BASOSABS 0.0 05/22/2022     CMP:    Lab Results   Component Value Date     05/22/2022    K 3.1 05/22/2022    K 4.0 05/18/2022     05/22/2022    CO2 27 05/22/2022    BUN 51 05/22/2022    CREATININE 1.8 05/22/2022    GFRAA 33 05/22/2022    AGRATIO 1.3 05/21/2022    LABGLOM 27 05/22/2022    LABGLOM 45 12/06/2018    GLUCOSE 57 05/22/2022    PROT 5.6 05/21/2022    LABALBU 3.0 05/22/2022    CALCIUM 8.7 05/22/2022    BILITOT 0.6 05/21/2022    ALKPHOS 86 05/21/2022    AST 17 05/21/2022    ALT 18 05/21/2022     Magnesium:    Lab Results   Component Value Date    MG 2.70 05/21/2022     Phosphorus:    Lab Results   Component Value Date    PHOS 3.6 05/22/2022     Uric Acid:  No results found for: LABURIC, URICACID  U/A:    Lab Results   Component Value Date    COLORU Yellow 04/28/2022    PROTEINU Negative 04/28/2022    PHUR 7.0 04/28/2022    WBCUA 3 04/15/2021    RBCUA 2 04/15/2021    YEAST neg 02/12/2021    BACTERIA trace 02/12/2021    BACTERIA 1+ 05/11/2020    CLARITYU Clear 04/28/2022    SPECGRAV 1.009 04/28/2022    LEUKOCYTESUR Negative 04/28/2022    UROBILINOGEN 1.0 04/28/2022    BILIRUBINUR Negative 04/28/2022    BLOODU Negative 04/28/2022    GLUCOSEU Negative 04/28/2022           IMPRESSION/RECOMMENDATIONS:      1. TORIBIO :   fractional excretion sodium < 1 , Mary < 20 , recoverd        2. CKD stage IV with a EGFR of 29   Cr 1.8  ,eGFR 33  better than baseline   follows up with me in office has been investigated  The renal function varies with the state of her CHF and it\"s treatment     3.   CHF: Hypervolemic, off diuretics , restart torsemide and continue to hold metolazone and aldactone     4. Pneumonia on antibiotics    5. Atrial fibrillation which is rate controlled  6. Diverticulitis - on antibiotics, diet being advanced      Thank you for allowing me to participate in the care of this patient. I will continue to follow along. Please call with questions.     Russell Stroud MD, MD  5/22/2022  [facility]

## 2022-05-22 NOTE — PROGRESS NOTES
[address]   Cardiology Progress Note     Date: 5/22/2022  Admit Date: 5/17/2022     Reason for consultation: CHF    Chief Complaint:   Chief Complaint   Patient presents with    Shortness of Breath     Cough and shortness of breath x 8 days, advised by Dr. Kelsey Downing office to come to emergency room. History of Present Illness: History obtained from patient and medical record. Tran Liao is a 77 y.o. female with a past medical history of HTN, HLD, DM, CAD s/p CABG x 3, S/p bioprosthetic MVR, WAYNE clip (8/2018), afib s/p Maze 2018, CMP (EF25-30%) DVT/PE on warfarin and sCHF follows with HF.  S/p ablation of afib w/ PVI, roofline, posterior, CTI flutter 10/30/2019. S/p ILR.  Hx of Mobitz I AVB and prolonged NM interval. S/p JUDE/CV.  Has been treated with amiodarone stopped in 2018.      Pt admitted with persistent cough and chest congestion. Interval Hx: Today, she is being seen for cardiology follow up. Her daughter is at the bedside. Pt is up in the chair and is feeling better. Her abdominal pain and distention has improved and she is now allowed to have clear liquids. Pt does have some mild leg swelling this AM, but otherwise denies any cardiac symptoms. Pt reports that she was mistakenly given IVF yesterday. She remains hemodynamically stable in sinus rhythm. Patient seen and examined. Clinical notes reviewed. Telemetry reviewed. No new complaints today. No major events overnight. Denies having chest pain, palpitations, shortness of breath, orthopnea/PND, cough, or dizziness at the time of this visit. Allergies:   Allergies   Allergen Reactions    Acujrvdq-Qgrejrs-Iwtmyr [Fluocinolone] Shortness Of Breath    Ciprofloxacin Shortness Of Breath    Diovan [Valsartan] Shortness Of Breath    Flagyl [Metronidazole] Shortness Of Breath     Has taken diflucan at home 12/7/15    Metformin And Related [Metformin And Related] Shortness Of Breath    Benazepril      Other reaction(s): Not Recorded    Morphine      Bad reaction. \"makes her feel horrible\".  Saxagliptin      Other reaction(s): Not Recorded    Levaquin [Levofloxacin] Rash     Home Meds:  Prior to Visit Medications    Medication Sig Taking?  Authorizing Provider   calcitRIOL (ROCALTROL) 0.25 MCG capsule Take 0.25 mcg by mouth daily   Historical Provider, MD   insulin lispro, 1 Unit Dial, (HUMALOG KWIKPEN) 100 UNIT/ML SOPN 5 units plusTID Insulin dosage per Sliding scale-140-199-2 units, 200-249- 3 units, 250-300-4 units, >300 take 5 units, max dose per day of 50 units  Emile Patiño MD   predniSONE (DELTASONE) 10 MG tablet TAKE 1 TABLET AS NEEDED (EOSINOPHILIC GASATRITIS)  Patient taking differently: as needed TAKE 1 TABLET AS NEEDED (EOSINOPHILIC GASATRITIS)  Emile Patiño MD   metOLazone (ZAROXOLYN) 2.5 MG tablet TAKE 1 TABLET ON TUESDAY AND FRIDAY AND AS DIRECTED  Violeta Lopez, APRN - CNS   spironolactone (ALDACTONE) 25 MG tablet TAKE 1 TABLET TWICE A DAY  Violeta Palomares, SOLEDAD - CNS   metoprolol succinate (TOPROL XL) 50 MG extended release tablet TAKE 1 TABLET DAILY  Emile Patiño MD   vitamin D (ERGOCALCIFEROL) 1.25 MG (88914 UT) CAPS capsule TAKE ONE CAPSULE BY MOUTH ONCE WEEKLY  SOLEDAD Mckinley - CNP   amiodarone (CORDARONE) 200 MG tablet TAKE 1 TABLET DAILY  Patient taking differently: Take 100 mg by mouth daily   Norma Davidson MD   torsemide (DEMADEX) 20 MG tablet TAKE 2 TABLETS TWICE A DAY  Emile Patiño MD   nystatin (MYCOSTATIN) 722754 UNIT/ML suspension TAKE ONE TEASPOONFUL BY MOUTH FOUR TIMES A DAY FOR 5 DAYS  Patient not taking: Reported on 5/17/2022  Emile Patiño MD   levothyroxine (SYNTHROID) 100 MCG tablet Take 1 tablet by mouth daily  Emile Patiño MD   blood glucose test strips (FREESTYLE LITE) strip USE FOUR TIMES A DAY  Emile Patiño MD   insulin glargine (LANTUS SOLOSTAR) 100 UNIT/ML injection pen INJECT 20 UNITS IN THE MORNING  Patient taking differently: 22 Units INJECT 20 UNITS IN THE MORNING  Juan Mcallister MD   albuterol sulfate  (90 Base) MCG/ACT inhaler USE 2 INHALATIONS EVERY 6 HOURS AS NEEDED FOR WHEEZING  Juan Mcallister MD   ipratropium-albuterol (DUONEB) 0.5-2.5 (3) MG/3ML SOLN nebulizer solution Inhale 3 mLs into the lungs every 4 hours as needed for Shortness of Breath  Juan Mcallister MD   midodrine (PROAMATINE) 2.5 MG tablet TAKE ONE TABLET BY MOUTH TWICE A DAY  Patient taking differently: as needed   Violeta Palomares, APRN - CNS   FreeStyle Lancets MISC 1 each by Does not apply route 4 times daily  Juan Mcallister MD   Blood Glucose Monitoring Suppl (FREESTYLE LITE) GAETANO 1 Device by Does not apply route 4 times daily  Historical Provider, MD   oxyCODONE (ROXICODONE) 5 MG immediate release tablet Take 0.5 mg by mouth daily. Historical Provider, MD   montelukast (SINGULAIR) 10 MG tablet TAKE 1 TABLET NIGHTLY  Juan Mcallister MD   potassium chloride (KLOR-CON M) 10 MEQ extended release tablet TAKE 1 TABLET DAILY  Juan Mcallister MD   warfarin (COUMADIN) 5 MG tablet TAKE 1 TABLET DAILY  Patient taking differently: Review INR prior to administration.   Managed by Dr. Dash Staples MD   Insulin Pen Needle (BD PEN NEEDLE JANNET U/F) 32G X 4 MM MISC USE 1 PEN NEEDLE FOUR TIMES A DAY  Juan Mcallister MD   magnesium oxide (MAG-OX) 400 MG tablet Take 1 tablet by mouth daily  Ruthy Gaytan MD   ACETAMINOPHEN PO Take 500 mg by mouth every 6 hours as needed   Historical Provider, MD   albuterol (PROVENTIL) (2.5 MG/3ML) 0.083% nebulizer solution Take 3 mLs by nebulization every 6 hours as needed for Wheezing  Juan Mcallister MD   polyethylene glycol (GLYCOLAX) 17 GM/SCOOP powder Take 17 g by mouth daily   Historical Provider, MD   omeprazole (PRILOSEC) 20 MG delayed release capsule Take 20 mg by mouth daily  Historical Provider, MD   ondansetron (ZOFRAN) 4 MG tablet Take 1 tablet by mouth 3 times daily as needed for Nausea or Vomiting  Israel Beal MD   aspirin 81 MG chewable tablet Take 1 tablet by mouth daily  Israel Beal MD   vitamin B-12 500 MCG tablet Take 1 tablet by mouth daily  Tricia Stiles MD      Scheduled Meds:   insulin glargine  15 Units SubCUTAneous Daily    insulin lispro  0-12 Units SubCUTAneous TID     insulin lispro  0-6 Units SubCUTAneous Nightly    naloxegol  25 mg Oral QAM    enoxaparin  1 mg/kg SubCUTAneous Daily    predniSONE  40 mg Oral Daily    Followed by   Francia Brasoraya ON 5/23/2022] predniSONE  30 mg Oral Daily    Followed by   Francia Brasoraya ON 5/26/2022] predniSONE  20 mg Oral Daily    Followed by   Francia Brasoraya ON 5/29/2022] predniSONE  10 mg Oral Daily    piperacillin-tazobactam  3,375 mg IntraVENous Q8H    amiodarone  100 mg Oral Daily    aspirin  81 mg Oral Daily    calcitRIOL  0.25 mcg Oral Daily    levothyroxine  100 mcg Oral Daily    magnesium oxide  400 mg Oral Daily    metoprolol succinate  50 mg Oral Daily    montelukast  10 mg Oral Nightly    pantoprazole  40 mg Oral QAM AC    sodium chloride flush  5-40 mL IntraVENous 2 times per day    haloperidol lactate  2 mg IntraVENous Once    diphenhydrAMINE  25 mg IntraVENous Once     Continuous Infusions:   sodium chloride Stopped (05/19/22 9808)    dextrose      sodium chloride 25 mL/hr at 05/22/22 0629     PRN Meds:potassium chloride **OR** potassium alternative oral replacement **OR** potassium chloride, simethicone, bisacodyl, benzocaine-menthol, albuterol sulfate HFA, ipratropium-albuterol, dextrose bolus **OR** dextrose bolus, glucagon (rDNA), dextrose, sodium chloride flush, sodium chloride, ondansetron **OR** ondansetron, polyethylene glycol, acetaminophen **OR** acetaminophen, guaiFENesin-dextromethorphan, oxyCODONE     Past Medical History:  Past Medical History:   Diagnosis Date    Asthma     Atrial fibrillation (HCC)     Eosinophilia     Hemoptysis     HIGH CHOLESTEROL     Hx of blood clots     Hypertension muscle pain, or injuries  · Neurological/Psych: Negative for confusion, seizures, headaches, balance issues or TIA-like symptoms. No anxiety, depression, or insomnia    Physical Examination:  Vitals:    05/22/22 0833   BP:    Pulse: 75   Resp:    Temp:    SpO2:       In: 716 [P.O.:716]  Out: -    Wt Readings from Last 3 Encounters:   05/20/22 204 lb 3.2 oz (92.6 kg)   05/06/22 206 lb (93.4 kg)   05/06/22 204 lb 4 oz (92.6 kg)       Intake/Output Summary (Last 24 hours) at 5/22/2022 1610  Last data filed at 5/21/2022 2147  Gross per 24 hour   Intake 716 ml   Output --   Net 716 ml       Telemetry: Personally Reviewed  - Sinus rhythm with 1st degree AVB. Occasional PACs  · Constitutional: Cooperative and in no apparent distress, and appears well nourished  · Skin: Warm and pink; no pallor, cyanosis, bruising, or clubbing  · HEENT: Symmetric and normocephalic. PERRL, EOM intact. Conjunctiva pink with clear sclera. Mucus membranes pink and moist. Teeth intact. Thyroid smooth without nodules or goiter. · Cardiovascular: Regular rate and rhythm. S1/S2 present without murmurs, rubs, or gallops. Peripheral pulses 2+, capillary refill < 3 seconds. No elevation of JVP. Trace pedal edema  · Respiratory: Respirations symmetric and unlabored. Lungs clear to auscultation bilaterally, no wheezing, crackles, or rhonchi  · Gastrointestinal: Abdomen soft and round. Bowel sounds normoactive in all quadrants without tenderness or masses. · Musculoskeletal: Bilateral upper and lower extremity strength 5/5 with full ROM  · Neurologic/Psych: Awake and orientated to person, place and time.  Calm affect, appropriate mood    Pertinent labs, diagnostic, device, and imaging results reviewed as a part of this visit    Labs:    BMP:   Recent Labs     05/20/22  0505 05/21/22  0448 05/22/22  0540   * 137 139   K 4.1 3.5 3.1*   CL 97* 98* 100   CO2 22 27 27   PHOS 3.3 3.6 3.6   BUN 66* 61* 51*   CREATININE 1.8* 1.9* 1.8*   MG  --  2.70*  -- Estimated Creatinine Clearance: 32 mL/min (A) (based on SCr of 1.8 mg/dL (H)). CBC:   Recent Labs     22  0448 22  0540   WBC 9.1 6.8   HGB 10.8* 10.7*   HCT 33.6* 32.2*   MCV 88.9 88.0    200     Thyroid:   Lab Results   Component Value Date    TSH 0.96 2022     Lipids:   Lab Results   Component Value Date    CHOL 162 2021    HDL 54 2021    TRIG 89 2021     LFTS:   Lab Results   Component Value Date    ALT 18 2022    AST 17 2022    ALKPHOS 86 2022    PROT 5.6 2022    AGRATIO 1.3 2022    BILITOT 0.6 2022     Cardiac Enzymes:   Lab Results   Component Value Date    TROPONINI 0.04 2022    TROPONINI 0.06 2022    TROPONINI 0.05 2022     Coags:   Lab Results   Component Value Date    PROTIME 18.8 2022    INR 1.63 2022       EC22 (Personally Interpreted)   - NSR with 1st degree AV Block and PAC    ECHO: 22   Technically difficult examination due to visualization and irregular rhythm   Normal left ventricle size. There is moderate concentric left ventricular hypertrophy. Ejection fraction is visually estimated to be 40%. Overall left ventricular systolic function appears moderately reduced. There is akinesis of the apex walls. 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   No evidence of apical thrombus by contrast administration. A bioprosthetic mitral valve is well seated. Visually opens well. Cannot   adequately assess for dysfunction as gradients not obtained. The left atrium is moderately dilated. No pericardial effusion.     Cath: 5/3/21  Findings:  Artery   Findings/Result  LM       Normal  LAD     50% mid, 70% mid, competitive flow distal from LIMA  Cx        OM1 20% ostial to prox, superior branch mid OM1 50% ostial  RI         N/A  S-D-RPL          patent  L-LAD  patent  RCA     50-60% distal, very heavily calcified  LVEDP            15  AV       Peak to peak gradient 36mmHg, valve crossed easily (supraventricular tachycardia) ([facility] [address] 92/87/3756    Eosinophilic gastritis 22/39/1492    Generalized osteoarthrosis, involving multiple sites 03/25/2013    Long term current use of anticoagulant 12/19/2012    Hypercholesteremia 12/19/2012        Assessment and Plan:     1. Persistent Atrial Fibrillation   - Hx of WAYNE clip (2018); JUDE shows appendage not occluded   - S/p DCCV x2 this admission(failed)       - Currently in NSR with 1st degree   - Continue Toprol XL 50 mg QD              - Due to significant CAD and cardiomyopathy, anti-arrhythmic therapies are limited to amiodarone. I have discussed with patient side effects of amiodarone including but not limited to thyroid disease, discoloration of skin and cornea and pulmonary fibrosis. Patient would like to continue with it.                ~ Continue amiodarone 100 mg daily               ~ TSH 0.96 (4/22); ALT 18, AST 22 (5/22)      - [id number] score:high ; [id number] Vasc score and anticoagulation discussed. High risk for stroke and thromboembolism. Anticoagulation is recommended. Risk of bleeding was discussed.      ~ On SQ lovenox. Coumadin on hold. Resume when ok with general surgery    2. Chronic systolic heart failure (NYHA Class IV)   - Appears fairly compensated               ~ EF 10-15% per echo (11/20)   - Continue with Toprol XL 50 mg QD    ~ Home aldactone, torsemide and metolazone on hold due to TORIBIO/minimal PO intake. Will gradually resume diuretics following resolution of abdominal issues and better PO intake    ~ No ACE/ARB due to CKD           - Monitor I&Os, daily weights               3. ICM              - S/p AICD (7/21)   - Follow up with device clinic as scheduled     4. CAD   - Hx of CABG (2018)   - Stable   - No complaints of angina   - Continue ASA, BB, and statin     5. HTN   - Controlled: Goal <130/80   - Continue current medications     6.  TORIBIO on CKD   - Stable, near baseline    ~ Baseline high 1's-low 2's   - Nephrology following    7. Diverticulitis   - On ABX   - NPO, but no plans for surgery at this time   - General surgery following    Multiple medical conditions with risk of decompensation. All pertinent information and plan of care discussed with the rounding physician. All questions and concerns were addressed to the patient. Alternatives to my treatment were discussed. I have discussed the above stated plan with patient and the nurse. The patient verbalized understanding and agreed with the plan. Thank you for allowing to us to participate in the care of [facility]    The patient was seen for >35 minutes. I reviewed interval history, physical exam, review of data including labs, imaging, development and implementation of treatment plan and coordination of complex care.     Rodolfo Wilde, SOLEDAD-CNP  [address]   Office: [phone]

## 2022-05-23 LAB
ALBUMIN SERPL-MCNC: 3 G/DL (ref 3.4–5)
ANION GAP SERPL CALCULATED.3IONS-SCNC: 12 MMOL/L (ref 3–16)
BASOPHILS ABSOLUTE: 0 K/UL (ref 0–0.2)
BASOPHILS RELATIVE PERCENT: 0 %
BUN BLDV-MCNC: 36 MG/DL (ref 7–20)
CALCIUM SERPL-MCNC: 8.6 MG/DL (ref 8.3–10.6)
CHLORIDE BLD-SCNC: 100 MMOL/L (ref 99–110)
CO2: 25 MMOL/L (ref 21–32)
CREAT SERPL-MCNC: 1.5 MG/DL (ref 0.6–1.2)
EOSINOPHILS ABSOLUTE: 0 K/UL (ref 0–0.6)
EOSINOPHILS RELATIVE PERCENT: 0.5 %
GFR AFRICAN AMERICAN: 41
GFR NON-AFRICAN AMERICAN: 34
GLUCOSE BLD-MCNC: 165 MG/DL (ref 70–99)
GLUCOSE BLD-MCNC: 224 MG/DL (ref 70–99)
GLUCOSE BLD-MCNC: 234 MG/DL (ref 70–99)
GLUCOSE BLD-MCNC: 260 MG/DL (ref 70–99)
GLUCOSE BLD-MCNC: 321 MG/DL (ref 70–99)
GLUCOSE BLD-MCNC: 92 MG/DL (ref 70–99)
HCT VFR BLD CALC: 31.6 % (ref 36–48)
HEMOGLOBIN: 10.3 G/DL (ref 12–16)
INR BLD: 1.29 (ref 0.88–1.12)
LYMPHOCYTES ABSOLUTE: 0.8 K/UL (ref 1–5.1)
LYMPHOCYTES RELATIVE PERCENT: 14.3 %
MCH RBC QN AUTO: 29.1 PG (ref 26–34)
MCHC RBC AUTO-ENTMCNC: 32.6 G/DL (ref 31–36)
MCV RBC AUTO: 89.2 FL (ref 80–100)
MONOCYTES ABSOLUTE: 0.6 K/UL (ref 0–1.3)
MONOCYTES RELATIVE PERCENT: 10.3 %
NEUTROPHILS ABSOLUTE: 4.2 K/UL (ref 1.7–7.7)
NEUTROPHILS RELATIVE PERCENT: 74.9 %
PDW BLD-RTO: 17.8 % (ref 12.4–15.4)
PERFORMED ON: ABNORMAL
PERFORMED ON: NORMAL
PHOSPHORUS: 2.5 MG/DL (ref 2.5–4.9)
PLATELET # BLD: 202 K/UL (ref 135–450)
PMV BLD AUTO: 8.3 FL (ref 5–10.5)
POTASSIUM SERPL-SCNC: 2.9 MMOL/L (ref 3.5–5.1)
POTASSIUM SERPL-SCNC: 3.6 MMOL/L (ref 3.5–5.1)
PROTHROMBIN TIME: 14.7 SEC (ref 9.9–12.7)
RBC # BLD: 3.54 M/UL (ref 4–5.2)
SODIUM BLD-SCNC: 137 MMOL/L (ref 136–145)
WBC # BLD: 5.7 K/UL (ref 4–11)

## 2022-05-23 PROCEDURE — 84132 ASSAY OF SERUM POTASSIUM: CPT

## 2022-05-23 PROCEDURE — 6370000000 HC RX 637 (ALT 250 FOR IP): Performed by: HOSPITALIST

## 2022-05-23 PROCEDURE — 1200000000 HC SEMI PRIVATE

## 2022-05-23 PROCEDURE — 6360000002 HC RX W HCPCS: Performed by: HOSPITALIST

## 2022-05-23 PROCEDURE — 94761 N-INVAS EAR/PLS OXIMETRY MLT: CPT

## 2022-05-23 PROCEDURE — 2580000003 HC RX 258: Performed by: HOSPITALIST

## 2022-05-23 PROCEDURE — 80069 RENAL FUNCTION PANEL: CPT

## 2022-05-23 PROCEDURE — 99232 SBSQ HOSP IP/OBS MODERATE 35: CPT | Performed by: CLINICAL NURSE SPECIALIST

## 2022-05-23 PROCEDURE — 99232 SBSQ HOSP IP/OBS MODERATE 35: CPT | Performed by: SURGERY

## 2022-05-23 PROCEDURE — APPNB30 APP NON BILLABLE TIME 0-30 MINS: Performed by: NURSE PRACTITIONER

## 2022-05-23 PROCEDURE — 36415 COLL VENOUS BLD VENIPUNCTURE: CPT

## 2022-05-23 PROCEDURE — 2580000003 HC RX 258: Performed by: NURSE PRACTITIONER

## 2022-05-23 PROCEDURE — 6360000002 HC RX W HCPCS: Performed by: NURSE PRACTITIONER

## 2022-05-23 PROCEDURE — APPSS15 APP SPLIT SHARED TIME 0-15 MINUTES: Performed by: NURSE PRACTITIONER

## 2022-05-23 PROCEDURE — 6370000000 HC RX 637 (ALT 250 FOR IP): Performed by: CLINICAL NURSE SPECIALIST

## 2022-05-23 PROCEDURE — 6370000000 HC RX 637 (ALT 250 FOR IP): Performed by: INTERNAL MEDICINE

## 2022-05-23 PROCEDURE — 94640 AIRWAY INHALATION TREATMENT: CPT

## 2022-05-23 PROCEDURE — 94760 N-INVAS EAR/PLS OXIMETRY 1: CPT

## 2022-05-23 PROCEDURE — 85610 PROTHROMBIN TIME: CPT

## 2022-05-23 PROCEDURE — 6370000000 HC RX 637 (ALT 250 FOR IP): Performed by: NURSE PRACTITIONER

## 2022-05-23 PROCEDURE — 85025 COMPLETE CBC W/AUTO DIFF WBC: CPT

## 2022-05-23 RX ORDER — POTASSIUM CHLORIDE 20 MEQ/1
40 TABLET, EXTENDED RELEASE ORAL ONCE
Status: COMPLETED | OUTPATIENT
Start: 2022-05-23 | End: 2022-05-23

## 2022-05-23 RX ORDER — WARFARIN SODIUM 5 MG/1
5 TABLET ORAL
Status: COMPLETED | OUTPATIENT
Start: 2022-05-23 | End: 2022-05-23

## 2022-05-23 RX ORDER — GUAIFENESIN/DEXTROMETHORPHAN 100-10MG/5
5 SYRUP ORAL EVERY 4 HOURS PRN
Qty: 120 ML | Refills: 0 | Status: SHIPPED | OUTPATIENT
Start: 2022-05-23 | End: 2022-06-02

## 2022-05-23 RX ORDER — SPIRONOLACTONE 25 MG/1
25 TABLET ORAL 2 TIMES DAILY
Status: DISCONTINUED | OUTPATIENT
Start: 2022-05-23 | End: 2022-05-24 | Stop reason: HOSPADM

## 2022-05-23 RX ORDER — AMOXICILLIN AND CLAVULANATE POTASSIUM 500; 125 MG/1; MG/1
1 TABLET, FILM COATED ORAL 3 TIMES DAILY
Qty: 30 TABLET | Refills: 0 | Status: SHIPPED | OUTPATIENT
Start: 2022-05-23 | End: 2022-06-02

## 2022-05-23 RX ORDER — PREDNISONE 10 MG/1
30 TABLET ORAL DAILY
Qty: 6 TABLET | Refills: 0 | Status: SHIPPED | OUTPATIENT
Start: 2022-05-24 | End: 2022-05-24

## 2022-05-23 RX ADMIN — SPIRONOLACTONE 25 MG: 25 TABLET ORAL at 16:57

## 2022-05-23 RX ADMIN — LEVOTHYROXINE SODIUM 100 MCG: 0.1 TABLET ORAL at 09:46

## 2022-05-23 RX ADMIN — NALOXEGOL OXALATE 25 MG: 25 TABLET, FILM COATED ORAL at 11:11

## 2022-05-23 RX ADMIN — Medication 10 ML: at 09:47

## 2022-05-23 RX ADMIN — INSULIN LISPRO 4 UNITS: 100 INJECTION, SOLUTION INTRAVENOUS; SUBCUTANEOUS at 16:59

## 2022-05-23 RX ADMIN — GUAIFENESIN AND DEXTROMETHORPHAN 5 ML: 100; 10 SYRUP ORAL at 02:25

## 2022-05-23 RX ADMIN — MONTELUKAST SODIUM 10 MG: 10 TABLET, FILM COATED ORAL at 21:17

## 2022-05-23 RX ADMIN — SODIUM CHLORIDE: 9 INJECTION, SOLUTION INTRAVENOUS at 06:25

## 2022-05-23 RX ADMIN — ENOXAPARIN SODIUM 90 MG: 100 INJECTION SUBCUTANEOUS at 09:46

## 2022-05-23 RX ADMIN — POTASSIUM CHLORIDE 40 MEQ: 20 TABLET, EXTENDED RELEASE ORAL at 13:00

## 2022-05-23 RX ADMIN — POTASSIUM CHLORIDE 10 MEQ: 7.46 INJECTION, SOLUTION INTRAVENOUS at 22:31

## 2022-05-23 RX ADMIN — PIPERACILLIN AND TAZOBACTAM 3375 MG: 3; .375 INJECTION, POWDER, LYOPHILIZED, FOR SOLUTION INTRAVENOUS at 06:26

## 2022-05-23 RX ADMIN — POTASSIUM CHLORIDE 10 MEQ: 7.46 INJECTION, SOLUTION INTRAVENOUS at 11:07

## 2022-05-23 RX ADMIN — IPRATROPIUM BROMIDE AND ALBUTEROL SULFATE 3 ML: 2.5; .5 SOLUTION RESPIRATORY (INHALATION) at 06:23

## 2022-05-23 RX ADMIN — CALCITRIOL 0.25 MCG: 0.25 CAPSULE ORAL at 09:46

## 2022-05-23 RX ADMIN — INSULIN GLARGINE 15 UNITS: 100 INJECTION, SOLUTION SUBCUTANEOUS at 09:47

## 2022-05-23 RX ADMIN — POTASSIUM CHLORIDE 10 MEQ: 7.46 INJECTION, SOLUTION INTRAVENOUS at 14:14

## 2022-05-23 RX ADMIN — IPRATROPIUM BROMIDE AND ALBUTEROL SULFATE 3 ML: 2.5; .5 SOLUTION RESPIRATORY (INHALATION) at 02:28

## 2022-05-23 RX ADMIN — OXYCODONE 5 MG: 5 TABLET ORAL at 16:57

## 2022-05-23 RX ADMIN — PANTOPRAZOLE SODIUM 40 MG: 40 TABLET, DELAYED RELEASE ORAL at 06:21

## 2022-05-23 RX ADMIN — WARFARIN SODIUM 5 MG: 5 TABLET ORAL at 16:57

## 2022-05-23 RX ADMIN — POTASSIUM CHLORIDE 10 MEQ: 7.46 INJECTION, SOLUTION INTRAVENOUS at 13:04

## 2022-05-23 RX ADMIN — IPRATROPIUM BROMIDE AND ALBUTEROL SULFATE 3 ML: 2.5; .5 SOLUTION RESPIRATORY (INHALATION) at 21:47

## 2022-05-23 RX ADMIN — GUAIFENESIN AND DEXTROMETHORPHAN 5 ML: 100; 10 SYRUP ORAL at 06:21

## 2022-05-23 RX ADMIN — SPIRONOLACTONE 25 MG: 25 TABLET ORAL at 13:01

## 2022-05-23 RX ADMIN — INSULIN LISPRO 4 UNITS: 100 INJECTION, SOLUTION INTRAVENOUS; SUBCUTANEOUS at 21:17

## 2022-05-23 RX ADMIN — AMIODARONE HYDROCHLORIDE 100 MG: 200 TABLET ORAL at 09:47

## 2022-05-23 RX ADMIN — PIPERACILLIN AND TAZOBACTAM 3375 MG: 3; .375 INJECTION, POWDER, LYOPHILIZED, FOR SOLUTION INTRAVENOUS at 16:32

## 2022-05-23 RX ADMIN — PREDNISONE 30 MG: 20 TABLET ORAL at 09:46

## 2022-05-23 RX ADMIN — Medication 400 MG: at 09:46

## 2022-05-23 RX ADMIN — METOPROLOL SUCCINATE 50 MG: 50 TABLET, EXTENDED RELEASE ORAL at 09:46

## 2022-05-23 RX ADMIN — Medication 10 ML: at 21:16

## 2022-05-23 RX ADMIN — TORSEMIDE 20 MG: 20 TABLET ORAL at 09:46

## 2022-05-23 RX ADMIN — POTASSIUM CHLORIDE 10 MEQ: 7.46 INJECTION, SOLUTION INTRAVENOUS at 16:32

## 2022-05-23 RX ADMIN — ASPIRIN 81 MG 81 MG: 81 TABLET ORAL at 09:46

## 2022-05-23 RX ADMIN — POTASSIUM CHLORIDE 10 MEQ: 7.46 INJECTION, SOLUTION INTRAVENOUS at 18:51

## 2022-05-23 RX ADMIN — INSULIN LISPRO 6 UNITS: 100 INJECTION, SOLUTION INTRAVENOUS; SUBCUTANEOUS at 09:46

## 2022-05-23 RX ADMIN — SODIUM CHLORIDE: 9 INJECTION, SOLUTION INTRAVENOUS at 11:22

## 2022-05-23 ASSESSMENT — PAIN SCALES - GENERAL
PAINLEVEL_OUTOF10: 7
PAINLEVEL_OUTOF10: 3

## 2022-05-23 ASSESSMENT — PAIN DESCRIPTION - PAIN TYPE: TYPE: CHRONIC PAIN

## 2022-05-23 ASSESSMENT — PAIN DESCRIPTION - LOCATION: LOCATION: BACK

## 2022-05-23 NOTE — PROGRESS NOTES
[facility]        Progress Note    Patient Name: Adelaida Cota  MRN: [id number]  YOB: 1945  Date of Evaluation: 2022    Chief Complaint: Abdominal pain    Subjective:  No acute events overnight  Pain resolved/at baseline, only discomfort from Lovenox injection sites  No nausea or vomiting, tolerating regular diet  Passing flatus and stool  Up to chair at this time      Vital Signs:  Patient Vitals for the past 24 hrs:   BP Temp Temp src Pulse Resp SpO2 Height Weight   22 1213 -- -- -- -- -- -- 5' 8\" (1.727 m) --   22 0945 (!) 113/58 97.3 °F (36.3 °C) Oral 66 16 99 % -- --   22 0806 -- -- -- -- -- -- -- 204 lb 14.4 oz (92.9 kg)   22 0623 -- -- -- -- -- 97 % -- --   22 0228 -- -- -- -- -- 95 % -- --   22 2315 120/63 97.5 °F (36.4 °C) Oral 60 16 93 % -- --   22 2000 106/88 97.3 °F (36.3 °C) Oral 77 16 97 % -- --   22 1750 -- -- -- 65 -- -- -- --   22 1517 -- -- -- 64 -- -- -- --   22 1514 (!) 132/101 97.8 °F (36.6 °C) Oral 63 18 96 % -- --   22 1400 -- -- -- 75 -- -- -- --   22 1243 121/69 97.3 °F (36.3 °C) Axillary 74 18 96 % -- --      TEMPERATURE HISTORY 24H: Temp (24hrs), Av.4 °F (36.3 °C), Min:97.3 °F (36.3 °C), Max:97.8 °F (36.6 °C)    BLOOD PRESSURE HISTORY: Systolic (08KFJ), BDS:648 , Min:106 , VXE:130    Diastolic (12TJL), VWW:65, Min:58, Max:101      Intake/Output:  I/O last 3 completed shifts: In: 236 [P.O.:236]  Out: -   No intake/output data recorded.   Drain/tube Output:       Physical Exam:  General: awake, alert, oriented to  person, place, time  Cardiovascular:  regular rate and rhythm and no murmur noted  Lungs: clear to auscultation  Abdomen: soft, non-distended, mild lower abdominal tenderness only--primarily at Lovenox injection sites/small hematomas noted, bowel sounds present     Labs:  CBC:    Recent Labs     22  0448 22  0540 22  9844 WBC 9.1 6.8 5.7   HGB 10.8* 10.7* 10.3*   HCT 33.6* 32.2* 31.6*    200 202     BMP:    Recent Labs     05/21/22  0448 05/22/22  0540 05/23/22  0614    139 137   K 3.5 3.1* 2.9*   CL 98* 100 100   CO2 27 27 25   BUN 61* 51* 36*   CREATININE 1.9* 1.8* 1.5*   GLUCOSE 177* 57* 234*     Hepatic:    Recent Labs     05/21/22 0448   AST 17   ALT 18   BILITOT 0.6   ALKPHOS 86     Amylase:    Lab Results   Component Value Date    AMYLASE 38 12/07/2015     Lipase:    Lab Results   Component Value Date    LIPASE 35.0 05/10/2020    LIPASE 72.0 04/24/2019    LIPASE 78.0 01/14/2019      Mag:    Lab Results   Component Value Date    MG 2.70 05/21/2022    MG 2.30 08/13/2021     Phos:     Lab Results   Component Value Date    PHOS 2.5 05/23/2022    PHOS 3.6 05/22/2022      Coags:   Lab Results   Component Value Date    PROTIME 14.7 05/23/2022    INR 1.29 05/23/2022    APTT 35.4 05/21/2022       Cultures:  Anaerobic culture  No results found for: LABANAE  Fungus stain  No results found for requested labs within last 30 days. Gram stain  No results found for requested labs within last 30 days. Organism  Lab Results   Component Value Date/Time    ORG Staphylococcus coagulase-negative (A) 10/01/2018 08:55 PM    ORG Staphylococcus coagulase negative DNA Detected (A) 10/01/2018 08:55 PM    ORG Staphylococcus coagulase-negative (A) 10/01/2018 08:55 PM     Surgical culture  No results found for: CXSURG  Blood culture 1  No results found for requested labs within last 30 days. Blood culture 2  No results found for requested labs within last 30 days. Fecal occult  No results found for requested labs within last 30 days. GI bacterial pathogens by PCR  No results found for requested labs within last 30 days. C. difficile  No results found for requested labs within last 30 days. Urine culture  Lab Results   Component Value Date    LABURIN  02/12/2021     <50,000 CFU/ml mixed skin/urogenital art.  No disease  Atrial fibrillation, on Coumadin      Plan:  1. Pain improved, vitals/labs stable, tolerating diet, and passing stool; no further imaging or intervention planned at this time  2. Regular diet as tolerated; monitor bowel function  3. Antibiotics  4. Activity as tolerated, ambulate TID, up to chair for all meals  5. PRN analgesics and antiemetics--minimizing narcotics as tolerated  6. Okay to resume Coumadin  7. Management of medical comorbid etiologies per primary team and consulting services  8. Disposition: Okay for discharge home from a surgical perspective; follow up with Dr. Trang Agarwal in 2 weeks    EDUCATION:  Educated patient on plan of care and disease process--all questions answered. Plans discussed with patient and nursing. Reviewed and discussed with Dr. Trang Agarwal. Signed:  SOLEDAD Sheldon CNP  5/23/2022 12:30 PM     I have personally performed a face to face diagnostic evaluation on this patient. I have interviewed and examined the patient and I agree with the assessment above. Time was spent reviewing patient chart (including but not limited to notes, labs, imaging and other testing), interviewing and counseling patient and present family members, performing physical exam, documentation of my findings and subsequent follow up of ordered medication and testing, placing referrals and communication with patient care providers, coordinating future care as well as documentation in the EHR. This encompassed more than 50% of the total encounter time. In summary, my findings and plan are the following:   Ms. Vish Aguilera is a 77 y.o. female who presents with   Acute jejunal diverticulitis  Chronic small bowel pneumatosis, mesenteric gas  Pneumonia  CHF  CAD, status-post CABG  Diabetes  Acute on chronic kidney disease  Atrial fibrillation, on Coumadin    Plan:  1. Pain resolved  2. Tolerating diet  3. Passing stool  4. Antibiotics, transition to PO at discharge  5. Activity as tolerated  6.  May resume coumadin from surgical standpoint  7. Defer management of remainder of medical comorbidities to primary and consulting teams  8. No further acute general surgery issues, will sign off, can discharge when medically stable and follow with surgery electively after discharge as needed    Tushar Heath MD, FACS  5/23/2022  1:17 PM

## 2022-05-23 NOTE — PROGRESS NOTES
Nephrology progress Note  [phone]   [facility]. [employer]       Patient:  Edith Melissa   : 1945    Brief HPI  PMH of CKD, DM II, Afib, CAD/CABG, MVR,  HTN, HPL  Came in with sob  Being treated for pneumonia and on ABx  We are consulted for TORIBIO on CKD  Follows with Dr. Noni Bell for CKD    Subjective/Interva history    Pt seen and examined  Creatinine better  Cough+  Denies abdominal pain or nausea/emesis  No chest pain   Sob with exertion    Review of Systems   No fevers/chills    SHx:  visitors at the bed-side      Meds:  Scheduled Meds:   insulin glargine  15 Units SubCUTAneous Daily    insulin lispro  0-12 Units SubCUTAneous TID WC    insulin lispro  0-6 Units SubCUTAneous Nightly    naloxegol  25 mg Oral QAM    torsemide  20 mg Oral Daily    enoxaparin  1 mg/kg SubCUTAneous Daily    predniSONE  30 mg Oral Daily    Followed by   Thad Rosenberg ON 2022] predniSONE  20 mg Oral Daily    Followed by   Thad Rosenberg ON 2022] predniSONE  10 mg Oral Daily    piperacillin-tazobactam  3,375 mg IntraVENous Q8H    amiodarone  100 mg Oral Daily    aspirin  81 mg Oral Daily    calcitRIOL  0.25 mcg Oral Daily    levothyroxine  100 mcg Oral Daily    magnesium oxide  400 mg Oral Daily    metoprolol succinate  50 mg Oral Daily    montelukast  10 mg Oral Nightly    pantoprazole  40 mg Oral QAM AC    sodium chloride flush  5-40 mL IntraVENous 2 times per day    haloperidol lactate  2 mg IntraVENous Once    diphenhydrAMINE  25 mg IntraVENous Once     Continuous Infusions:   sodium chloride Stopped (22 9706)    dextrose      sodium chloride 25 mL/hr at 22 0625     PRN Meds:.potassium chloride **OR** potassium alternative oral replacement **OR** potassium chloride, simethicone, bisacodyl, benzocaine-menthol, albuterol sulfate HFA, ipratropium-albuterol, dextrose bolus **OR** dextrose bolus, glucagon (rDNA), dextrose, sodium chloride flush, sodium chloride, ondansetron **OR** ondansetron, polyethylene glycol, acetaminophen **OR** acetaminophen, guaiFENesin-dextromethorphan, oxyCODONE      Vitals:  /63   Pulse 60   Temp 97.5 °F (36.4 °C) (Oral)   Resp 16   Ht 5' 8\" (1.727 m)   Wt 204 lb 14.4 oz (92.9 kg)   SpO2 97%   BMI 31.15 kg/m²     Physical Exam  General : AAOx3, not in pain or respiratory distress, resting in bed  HEENT : mucosa moist.  CVS: S1 S2 normal, regular rhythm, no murmurs or rubs. Lungs: Clear, no wheezing or crackles. Abd: Soft, bowel sounds normal, non-tender. Ext: No edema, no cyanosis  Skin: Warm. No rashes appreciated.   : bladder non-distended, no tenderness over the bladder      Labs:  CBC with Differential:    Lab Results   Component Value Date    WBC 5.7 05/23/2022    RBC 3.54 05/23/2022    HGB 10.3 05/23/2022    HCT 31.6 05/23/2022     05/23/2022    MCV 89.2 05/23/2022    MCH 29.1 05/23/2022    MCHC 32.6 05/23/2022    RDW 17.8 05/23/2022    SEGSPCT 64.1 09/02/2020    BANDSPCT 1 01/14/2019    LYMPHOPCT 14.3 05/23/2022    MONOPCT 10.3 05/23/2022    BASOPCT 0.0 05/23/2022    MONOSABS 0.6 05/23/2022    LYMPHSABS 0.8 05/23/2022    EOSABS 0.0 05/23/2022    BASOSABS 0.0 05/23/2022     BMP:    Lab Results   Component Value Date     05/23/2022    K 2.9 05/23/2022    K 4.0 05/18/2022     05/23/2022    CO2 25 05/23/2022    BUN 36 05/23/2022    LABALBU 3.0 05/23/2022    CREATININE 1.5 05/23/2022    CALCIUM 8.6 05/23/2022    GFRAA 41 05/23/2022    LABGLOM 34 05/23/2022    LABGLOM 45 12/06/2018    GLUCOSE 234 05/23/2022     Ionized Calcium:  No results found for: IONCA  Magnesium:    Lab Results   Component Value Date    MG 2.70 05/21/2022     Phosphorus:    Lab Results   Component Value Date    PHOS 2.5 05/23/2022     LDH:    Lab Results   Component Value Date     08/26/2018     PTT:    Lab Results   Component Value Date    APTT 35.4 05/21/2022   [APTT}  Troponin:    Lab Results   Component Value Date    TROPONINI 0.04 05/17/2022 Last 3 Troponin:    Lab Results   Component Value Date    TROPONINI 0.04 05/17/2022    TROPONINI 0.06 05/17/2022    TROPONINI 0.05 05/17/2022       Assessment/Plan:    1. TORIBIO on CKD  Creatinine better, at or close to baseline  Can stop IVF  2. Hypokalemia  Replete, give additional kcl 40 mew po x1  Mag levels normal  3. Hypertension controlled  Continue same meds  4. Pneumonia on ABx  5. Atrial fibrillation rate controlled  On BB   6. Secondary hyperparathyroidism  Continue calcitriol       Hilda An MD, MD.  5/23/2022  Office Phone : [phone]    Thank you for allowing us to participate in the care of this pt. I willcontinue to follow along. Please call with questions or concerns.

## 2022-05-23 NOTE — PLAN OF CARE
Problem: Skin/Tissue Integrity  Goal: Absence of new skin breakdown  Description: 1. Monitor for areas of redness and/or skin breakdown  2. Assess vascular access sites hourly  3. Every 4-6 hours minimum:  Change oxygen saturation probe site  4. Every 4-6 hours:  If on nasal continuous positive airway pressure, respiratory therapy assess nares and determine need for appliance change or resting period.   5/23/2022 0524 by Elizabeth Gardner RN  Outcome: Progressing     Problem: Safety - Adult  Goal: Free from fall injury  5/23/2022 0524 by Elizabeth Gardner RN  Outcome: Progressing     Problem: ABCDS Injury Assessment  Goal: Absence of physical injury  5/23/2022 0524 by Elizabeth Gardner RN  Outcome: Progressing

## 2022-05-23 NOTE — PROGRESS NOTES
[address]   Daily Progress Note      Admit Date:  5/17/2022    HPI:    Ms. Eli Parham is a 77 y.o. female with history of CAD/CABG in 2018, PAF with maze 2018, HTN, HLD, DVT/PE on coumadin, 2 CVs unsuccessful, systolic heart failure with improvement in LVEF    She is admitted for persistent cough/chest congestion pneumonia after 2 failed po rounds of antibiotics. Her weight stays 198-200. Her creat was elevated on admission. Iron studies normal  CT abdomen with pneumatosis and mesenteric gas and inflammation started on antibiotics     Subjective:  Patient is being seen for chronic systolic heart failure. There were no acute overnight cardiac events. She is sitting up in the chair with her  at her side. She is getting IV potassium which is 2.9. She is not on her aldactone. She feels good but still with a cough non productive. Creat 2.6-2.4-1.8-1.5 sodium 137 weight 196-200-204    Objective:   BP (!) 113/58   Pulse 66   Temp 97.3 °F (36.3 °C) (Oral)   Resp 16   Ht 5' 8\" (1.727 m)   Wt 204 lb 14.4 oz (92.9 kg)   SpO2 99%   BMI 31.15 kg/m²     No intake or output data in the 24 hours ending 05/23/22 1231       Physical Exam:  General:  Awake, alert, oriented in NAD  Skin:  Warm and dry. No unusual bruising or rash  Neck:  Supple. No JVD or carotid bruit appreciated  Chest:  Normal effort.   Clear bilaterally, no rales, rhonchi or wheezing  Cardiovascular:  RRR, S1/S2, no murmur/gallop/rub  Abdomen:  Soft, distended, +bowel sounds  Extremities:  Bilateral lower ankle edema  Neurological: No focal deficits  Psychological: Normal mood and affect      Medications:    potassium chloride  40 mEq Oral Once    insulin glargine  15 Units SubCUTAneous Daily    insulin lispro  0-12 Units SubCUTAneous TID WC    insulin lispro  0-6 Units SubCUTAneous Nightly    naloxegol  25 mg Oral QAM    torsemide  20 mg Oral Daily    enoxaparin  1 mg/kg SubCUTAneous Daily    predniSONE  30 mg Oral Daily    Followed by   Trang Beck ON 5/26/2022] predniSONE  20 mg Oral Daily    Followed by   Trang Beck ON 5/29/2022] predniSONE  10 mg Oral Daily    piperacillin-tazobactam  3,375 mg IntraVENous Q8H    amiodarone  100 mg Oral Daily    aspirin  81 mg Oral Daily    calcitRIOL  0.25 mcg Oral Daily    levothyroxine  100 mcg Oral Daily    magnesium oxide  400 mg Oral Daily    metoprolol succinate  50 mg Oral Daily    montelukast  10 mg Oral Nightly    pantoprazole  40 mg Oral QAM AC    sodium chloride flush  5-40 mL IntraVENous 2 times per day    haloperidol lactate  2 mg IntraVENous Once    diphenhydrAMINE  25 mg IntraVENous Once      dextrose      sodium chloride 25 mL/hr at 05/23/22 0625       Lab Data:  CBC:   Recent Labs     05/21/22 0448 05/22/22  0540 05/23/22  0614   WBC 9.1 6.8 5.7   HGB 10.8* 10.7* 10.3*    200 202     BMP:    Recent Labs     05/21/22 0448 05/22/22  0540 05/23/22  0614    139 137   K 3.5 3.1* 2.9*   CO2 27 27 25   BUN 61* 51* 36*   CREATININE 1.9* 1.8* 1.5*     INR:    Recent Labs     05/21/22 0448 05/22/22  0540 05/23/22  0614   INR 2.09* 1.63* 1.29*     BNP:    No results for input(s): PROBNP in the last 72 hours. Diagnostics:  Echo 5/6/2022  Summary   Technically difficult examination due to visualization and irregular rhythm   Normal left ventricle size.   There is moderate concentric left ventricular hypertrophy.   Ejection fraction is visually estimated to be 40%.   Overall left ventricular systolic function appears moderately reduced.   There is akinesis of the apex walls. 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   No evidence of apical thrombus by contrast administration.   A bioprosthetic mitral valve is well seated. Visually opens well.  Cannot   adequately assess for dysfunction as gradients not obtained.   The left atrium is moderately dilated.   No pericardial effusion.     JUDE Preliminary 5/4/2021 Dr Misty Guerra  AV - area ~ 1.5, opens adequately, not severe aortic stenosis     [facility] 5/3/2021 Dr Ethelda Bence  Artery Findings/Result   LM Normal   LAD 50% mid, 70% mid, competitive flow distal from LIMA   Cx OM1 20% ostial to prox, superior branch mid OM1 50% ostial   RI N/A   S-D-RPL patent   L-LAD patent   RCA 50-60% distal, very heavily calcified   LVEDP 15   AV Peak to peak gradient 36mmHg, valve crossed easily with pigtail. LVG NA      Intervention:         None     Post Cath Dx:       Patent grafts    Echo 4/20/21  Summary   Overall left ventricular systolic function is severely depressed .   Ejection fraction is visually estimated to be 10-15 %. E/e'= 23.2 GLS=-3.4   Moderately dilated left ventricle.   There is severe diffuse hypokinesis.   Indeterminate diastolic function.   A bioprosthetic mitral valve is well seated with peak velocity of 1.59m/s   and a mean gradient of 3mmHg.   The left atrium is moderately dilated.   Mild aortic stenosis with a peak velocity of 2.5m/s and a mean pressure   gradient of 14mmHg.   The aortic valve is thickened/calcified with decreased leaflet mobility   consistent with aortic stenosis.   Mild to moderate tricuspid regurgitation.   Estimated pulmonary artery systolic pressure is mildly to moderately   elevated at 46 mmHg assuming a right atrial pressure of 8 mmHg    Assessment:    1. Cough  2. CAD status post grafts stents 2021  3. ICD in place  4. Chronic systolic heart failure, compensated with improvement in LVEF 10-40%  5. Status post MVR  6. TORIBIO on CKD      Plan:    1. CHF nurse following for diet education, fluid restriction and daily weights  2. Nephrology following  3. Continue torsemide 20 mg daily  4. Continue metoprolol 50 mg daily  5.   Resume aldactone 25 mg twice a day    She is not on her metolazone and torsemide dose 40 mg twice a day (will have to slowly increase as an outpatient)    She may benefit in staying tonight to assure potassium is normal and/or patient should have labs rechecked tomorrow    Discussed with hospitalist and nephrology. Discussed with patient who is agreeable with plan of care. Thank you for allowing me to participate in the care of your patient.     Moni Gustafson, APRN - CNS, CNS

## 2022-05-23 NOTE — PROGRESS NOTES
Pharmacy to Dose Warfarin    Pharmacy consulted to dose warfarin for Afib. INR Goal: 2-3    INR today: 1.29    Assessment/Plan:  - INR subtherapeutic after holding warfarin  - Will resume warfarin tonight but will give boost of 5 mg tonight instead of home dose of 2.5 mg  - Continue treatment lovenox to bridge until INR >2    Pharmacy will continue to follow.     Liz Heredia, PharmD, [facility]  Clinical Pharmacist  [id number]

## 2022-05-23 NOTE — PROGRESS NOTES
tablet 100 mcg, Daily  magnesium oxide (MAG-OX) tablet 400 mg, Daily  metoprolol succinate (TOPROL XL) extended release tablet 50 mg, Daily  montelukast (SINGULAIR) tablet 10 mg, Nightly  pantoprazole (PROTONIX) tablet 40 mg, QAM AC  dextrose bolus 10% 125 mL, PRN   Or  dextrose bolus 10% 250 mL, PRN  glucagon (rDNA) injection 1 mg, PRN  dextrose 5 % solution, PRN  sodium chloride flush 0.9 % injection 5-40 mL, 2 times per day  sodium chloride flush 0.9 % injection 5-40 mL, PRN  0.9 % sodium chloride infusion, PRN  ondansetron (ZOFRAN-ODT) disintegrating tablet 4 mg, Q8H PRN   Or  ondansetron (ZOFRAN) injection 4 mg, Q6H PRN  polyethylene glycol (GLYCOLAX) packet 17 g, Daily PRN  acetaminophen (TYLENOL) tablet 650 mg, Q6H PRN   Or  acetaminophen (TYLENOL) suppository 650 mg, Q6H PRN  guaiFENesin-dextromethorphan (ROBITUSSIN DM) 100-10 MG/5ML syrup 5 mL, Q4H PRN  haloperidol lactate (HALDOL) injection 2 mg, Once  diphenhydrAMINE (BENADRYL) injection 25 mg, Once  oxyCODONE (ROXICODONE) immediate release tablet 5 mg, Q4H PRN        Objective:  /76   Pulse 80   Temp 98 °F (36.7 °C) (Oral)   Resp 16   Ht 5' 8\" (1.727 m)   Wt 204 lb 14.4 oz (92.9 kg)   SpO2 99%   BMI 31.15 kg/m²   No intake or output data in the 24 hours ending 05/23/22 1421   Wt Readings from Last 3 Encounters:   05/23/22 204 lb 14.4 oz (92.9 kg)   05/06/22 206 lb (93.4 kg)   05/06/22 204 lb 4 oz (92.6 kg)       General appearance:  Appears comfortable  Eyes: Sclera clear. Pupils equal.  ENT: Moist oral mucosa. Trachea midline, no adenopathy. Cardiovascular: Regular rhythm, normal S1, S2. No murmur. No edema in lower extremities  Respiratory: Not using accessory muscles. Good inspiratory effort. Clear to auscultation bilaterally, no wheeze or crackles. GI: Abdomen soft, no tenderness, not distended, normal bowel sounds  Musculoskeletal: No cyanosis in digits, neck supple  Neurology: CN 2-12 grossly intact.  No speech or motor deficits  Psych: Normal affect. Alert and oriented in time, place and person  Skin: Warm, dry, normal turgor    Labs and Tests:  CBC:   Recent Labs     05/21/22 0448 05/22/22  0540 05/23/22  0614   WBC 9.1 6.8 5.7   HGB 10.8* 10.7* 10.3*    200 202     BMP:    Recent Labs     05/21/22 0448 05/22/22  0540 05/23/22  0614    139 137   K 3.5 3.1* 2.9*   CL 98* 100 100   CO2 27 27 25   BUN 61* 51* 36*   CREATININE 1.9* 1.8* 1.5*   GLUCOSE 177* 57* 234*     Hepatic:   Recent Labs     05/21/22 0448   AST 17   ALT 18   BILITOT 0.6   ALKPHOS 86       Discussed care with patient             Problem List  Principal Problem:    TORIBIO (acute kidney injury) ([facility] [address]  Active Problems:    Cough    Wheezing    Coronary artery disease due to calcified coronary lesion    Pneumatosis intestinalis    Acute kidney injury superimposed on CKD ([facility] [address]    ICD (implantable cardioverter-defibrillator) in place  Resolved Problems:    * No resolved hospital problems. *       Assessment & Plan:   1. Pneumonia  - improving  Plan to discharge on oral abx for few days         Abdominal pain  - new findings  -Not a CT scan.  -Appreciate general surgery recommendation we will continue serial abdominal exams for now  Cleared by surgery    Acute on CKD  - creatine is improved to 1.8  - per nephrology    Chronic CHF  - euvolumic  - resume lasix once ok with nephrolgoy  -Acacian adjusted by    Hypokalemia  -K still low. Patient will be replaced potassium     until she is able to eat for the    Atrial fibrillation  -Rate is controlled. Anticoagulation on hold for now      Diet: ADULT DIET; Regular  Code:Full Code  DVT PPX lovenox     Disposition  -Discussed with cardiology. They have made some medication adjustments.   If potassium remains stable plan for discharge later evening today we will repeat K after replacement if possible discharge tomorrow      Anthony Reyna MD   5/23/2022 2:21 PM

## 2022-05-23 NOTE — PROGRESS NOTES
Per replacement protocol, pt needs 6 bags of 10mEq of K. She has had three bags so far, third bag still infusing. Replacement taking longer than anticipated because infusions running at slower rate (burning at site if infusion faster than 50mL/hr). Provider informed. Per Dr. Vicente Buchanan, K level to be rechecked after replacement is completed, if OK, pt may d/c. May be tomorrow d/c.  Also administered one time dose of 40mEq PO K per nephrologist.

## 2022-05-23 NOTE — CARE COORDINATION
CM presented patient with  IMM (Important Message from Medicare) letter prior to discharge (w/in 48 hrs of dc). CM explained the right to appeal discharge and the process to follow. All questions answered. Copy of IMM provided to pt and pt signed CM copy. Date and time of signed IMM documented and placed on soft chart.      Pt instructed spouse to sign IMM d/t having multiple IVs.    Electronically signed by Gale Plaza RN on 5/23/2022 at 12:38 PM

## 2022-05-23 NOTE — PROGRESS NOTES
K 3.6. Provider placed d/c order. Pt has received 4 of 6 K bags per replacement protocol (bag 4 infusing). Pt would feel more comfortable staying overnight and d/c in the morning - provider notified.

## 2022-05-24 VITALS
HEIGHT: 68 IN | SYSTOLIC BLOOD PRESSURE: 129 MMHG | WEIGHT: 208.7 LBS | OXYGEN SATURATION: 98 % | TEMPERATURE: 97.5 F | BODY MASS INDEX: 31.63 KG/M2 | DIASTOLIC BLOOD PRESSURE: 71 MMHG | RESPIRATION RATE: 18 BRPM | HEART RATE: 69 BPM

## 2022-05-24 LAB
ALBUMIN SERPL-MCNC: 3 G/DL (ref 3.4–5)
ANION GAP SERPL CALCULATED.3IONS-SCNC: 11 MMOL/L (ref 3–16)
BASOPHILS ABSOLUTE: 0 K/UL (ref 0–0.2)
BASOPHILS RELATIVE PERCENT: 0 %
BUN BLDV-MCNC: 32 MG/DL (ref 7–20)
CALCIUM SERPL-MCNC: 8.7 MG/DL (ref 8.3–10.6)
CHLORIDE BLD-SCNC: 105 MMOL/L (ref 99–110)
CO2: 23 MMOL/L (ref 21–32)
CREAT SERPL-MCNC: 1.7 MG/DL (ref 0.6–1.2)
EOSINOPHILS ABSOLUTE: 0.1 K/UL (ref 0–0.6)
EOSINOPHILS RELATIVE PERCENT: 0.9 %
GFR AFRICAN AMERICAN: 35
GFR NON-AFRICAN AMERICAN: 29
GLUCOSE BLD-MCNC: 177 MG/DL (ref 70–99)
GLUCOSE BLD-MCNC: 238 MG/DL (ref 70–99)
HCT VFR BLD CALC: 32.6 % (ref 36–48)
HEMOGLOBIN: 10.4 G/DL (ref 12–16)
INR BLD: 1.11 (ref 0.88–1.12)
LYMPHOCYTES ABSOLUTE: 0.8 K/UL (ref 1–5.1)
LYMPHOCYTES RELATIVE PERCENT: 11.7 %
MCH RBC QN AUTO: 28.5 PG (ref 26–34)
MCHC RBC AUTO-ENTMCNC: 31.9 G/DL (ref 31–36)
MCV RBC AUTO: 89.4 FL (ref 80–100)
MONOCYTES ABSOLUTE: 0.6 K/UL (ref 0–1.3)
MONOCYTES RELATIVE PERCENT: 8.7 %
NEUTROPHILS ABSOLUTE: 5.3 K/UL (ref 1.7–7.7)
NEUTROPHILS RELATIVE PERCENT: 78.7 %
PDW BLD-RTO: 18 % (ref 12.4–15.4)
PERFORMED ON: ABNORMAL
PHOSPHORUS: 2.6 MG/DL (ref 2.5–4.9)
PLATELET # BLD: 209 K/UL (ref 135–450)
PMV BLD AUTO: 8.1 FL (ref 5–10.5)
POTASSIUM SERPL-SCNC: 4 MMOL/L (ref 3.5–5.1)
PROTHROMBIN TIME: 12.6 SEC (ref 9.9–12.7)
RBC # BLD: 3.65 M/UL (ref 4–5.2)
SODIUM BLD-SCNC: 139 MMOL/L (ref 136–145)
WBC # BLD: 6.7 K/UL (ref 4–11)

## 2022-05-24 PROCEDURE — 6360000002 HC RX W HCPCS: Performed by: HOSPITALIST

## 2022-05-24 PROCEDURE — 99232 SBSQ HOSP IP/OBS MODERATE 35: CPT | Performed by: CLINICAL NURSE SPECIALIST

## 2022-05-24 PROCEDURE — 36415 COLL VENOUS BLD VENIPUNCTURE: CPT

## 2022-05-24 PROCEDURE — 6370000000 HC RX 637 (ALT 250 FOR IP): Performed by: NURSE PRACTITIONER

## 2022-05-24 PROCEDURE — 85610 PROTHROMBIN TIME: CPT

## 2022-05-24 PROCEDURE — 6370000000 HC RX 637 (ALT 250 FOR IP): Performed by: INTERNAL MEDICINE

## 2022-05-24 PROCEDURE — 6370000000 HC RX 637 (ALT 250 FOR IP): Performed by: HOSPITALIST

## 2022-05-24 PROCEDURE — 80069 RENAL FUNCTION PANEL: CPT

## 2022-05-24 PROCEDURE — 85025 COMPLETE CBC W/AUTO DIFF WBC: CPT

## 2022-05-24 PROCEDURE — 2580000003 HC RX 258: Performed by: HOSPITALIST

## 2022-05-24 PROCEDURE — 6370000000 HC RX 637 (ALT 250 FOR IP): Performed by: CLINICAL NURSE SPECIALIST

## 2022-05-24 RX ORDER — TORSEMIDE 20 MG/1
40 TABLET ORAL DAILY
Status: DISCONTINUED | OUTPATIENT
Start: 2022-05-25 | End: 2022-05-24 | Stop reason: HOSPADM

## 2022-05-24 RX ORDER — WARFARIN SODIUM 2.5 MG/1
2.5 TABLET ORAL
Status: DISCONTINUED | OUTPATIENT
Start: 2022-05-25 | End: 2022-05-24 | Stop reason: HOSPADM

## 2022-05-24 RX ORDER — PREDNISONE 20 MG/1
20 TABLET ORAL DAILY
Qty: 3 TABLET | Refills: 0 | Status: SHIPPED | OUTPATIENT
Start: 2022-05-24 | End: 2022-05-27

## 2022-05-24 RX ORDER — WARFARIN SODIUM 5 MG/1
5 TABLET ORAL
Status: DISCONTINUED | OUTPATIENT
Start: 2022-05-24 | End: 2022-05-24 | Stop reason: HOSPADM

## 2022-05-24 RX ADMIN — TORSEMIDE 20 MG: 20 TABLET ORAL at 07:00

## 2022-05-24 RX ADMIN — Medication 400 MG: at 07:01

## 2022-05-24 RX ADMIN — METOPROLOL SUCCINATE 50 MG: 50 TABLET, EXTENDED RELEASE ORAL at 07:00

## 2022-05-24 RX ADMIN — SPIRONOLACTONE 25 MG: 25 TABLET ORAL at 07:00

## 2022-05-24 RX ADMIN — PREDNISONE 30 MG: 20 TABLET ORAL at 07:00

## 2022-05-24 RX ADMIN — INSULIN GLARGINE 15 UNITS: 100 INJECTION, SOLUTION SUBCUTANEOUS at 08:30

## 2022-05-24 RX ADMIN — AMIODARONE HYDROCHLORIDE 100 MG: 200 TABLET ORAL at 07:00

## 2022-05-24 RX ADMIN — LEVOTHYROXINE SODIUM 100 MCG: 0.1 TABLET ORAL at 07:00

## 2022-05-24 RX ADMIN — CALCITRIOL 0.25 MCG: 0.25 CAPSULE ORAL at 07:00

## 2022-05-24 RX ADMIN — ASPIRIN 81 MG 81 MG: 81 TABLET ORAL at 07:00

## 2022-05-24 RX ADMIN — NALOXEGOL OXALATE 25 MG: 25 TABLET, FILM COATED ORAL at 08:30

## 2022-05-24 RX ADMIN — PANTOPRAZOLE SODIUM 40 MG: 40 TABLET, DELAYED RELEASE ORAL at 07:01

## 2022-05-24 RX ADMIN — OXYCODONE 5 MG: 5 TABLET ORAL at 08:39

## 2022-05-24 RX ADMIN — INSULIN LISPRO 2 UNITS: 100 INJECTION, SOLUTION INTRAVENOUS; SUBCUTANEOUS at 08:31

## 2022-05-24 RX ADMIN — ENOXAPARIN SODIUM 90 MG: 100 INJECTION SUBCUTANEOUS at 08:32

## 2022-05-24 RX ADMIN — Medication 5 ML: at 08:40

## 2022-05-24 ASSESSMENT — PAIN SCALES - GENERAL
PAINLEVEL_OUTOF10: 7
PAINLEVEL_OUTOF10: 5

## 2022-05-24 ASSESSMENT — PAIN DESCRIPTION - ORIENTATION
ORIENTATION: UPPER
ORIENTATION: UPPER

## 2022-05-24 ASSESSMENT — PAIN DESCRIPTION - LOCATION
LOCATION: BACK
LOCATION: BACK

## 2022-05-24 ASSESSMENT — PAIN DESCRIPTION - DESCRIPTORS
DESCRIPTORS: ACHING
DESCRIPTORS: ACHING

## 2022-05-24 ASSESSMENT — PAIN DESCRIPTION - PAIN TYPE: TYPE: CHRONIC PAIN

## 2022-05-24 NOTE — PROGRESS NOTES
Data- discharge order received, pt verbalized agreement to discharge, disposition to previous residence, no needs for HHC/DME. Action- discharge instructions prepared and given to pt, pt verbalized understanding. Medication information packet given r/t NEW and/or CHANGED prescriptions emphasizing name/purpose/side effects, pt verbalized understanding. Discharge instruction summary: Diet- reg, Activity- as tolerated, Primary Care Physician as follows: Mariam Kinsey [phone] f/u appointment scheduled by pt, immunizations reviewed and N/A, prescription medications filled at [facility] pharmacy. Inpatient surgical procedure precautions reviewed: N/A CHF Education reviewed. Pt/ Family has had a total of 60 minutes CHF education this admission encounter. 1. WEIGHT: Admit Weight: 196 lb (88.9 kg) (05/17/22 1518)        Today  Weight: 208 lb 11.2 oz (94.7 kg) (05/24/22 0518)       2. O2 SAT.: SpO2: 98 % (05/24/22 0730)    Response- Pt belongings gathered, IV removed. Disposition is home (no HHC/DME needs), transported with belongings, taken to lobby via w/c w/ staff, no complications.

## 2022-05-24 NOTE — PROGRESS NOTES
Pharmacy to Dose Warfarin    Pharmacy consulted to dose warfarin for Afib. INR Goal: 2-3    INR today: 1.11    Assessment/Plan:  - INR subtherapeutic after holding warfarin  - Will continue with home dose tonight, should receive 5 mg tonight  - Continue lovenox for bridging until INR >2    Pharmacy will continue to follow.     Darvin Dominguez, PharmD, BCPS  Clinical Pharmacist  [id number]

## 2022-05-24 NOTE — PROGRESS NOTES
[facility]   Daily Progress Note      Admit Date:  5/17/2022    HPI:    Ms. Lamonte Brian is a 77 y.o. female with history of CAD/CABG in 2018, PAF with maze 2018, HTN, HLD, DVT/PE on coumadin, 2 CVs unsuccessful, systolic heart failure with improvement in LVEF    She is admitted for persistent cough/chest congestion pneumonia after 2 failed po rounds of antibiotics. Her weight stays 198-200. Her creat was elevated on admission. Iron studies normal  CT abdomen with pneumatosis and mesenteric gas and inflammation started on antibiotics     Subjective:  Patient is being seen for chronic systolic heart failure. There were no acute overnight cardiac events. Potassium improved with replacement and resuming aldactone, now 4.0 creat 1.7   She is sitting up in the chair with her  by her side. She feels good and ready to go home. Creat 2.6-2.4-1.8-1.5-1.7 weight 196-200-204    Objective:   /71   Pulse 69   Temp 97.5 °F (36.4 °C) (Oral)   Resp 18   Ht 5' 8\" (1.727 m)   Wt 208 lb 11.2 oz (94.7 kg)   SpO2 98%   BMI 31.73 kg/m²       Intake/Output Summary (Last 24 hours) at 5/24/2022 0850  Last data filed at 5/24/2022 0436  Gross per 24 hour   Intake 27441.19 ml   Output --   Net 68175.19 ml          Physical Exam:  General:  Awake, alert, oriented in NAD  Skin:  Warm and dry. No unusual bruising or rash  Neck:  Supple. No JVD or carotid bruit appreciated  Chest:  Normal effort.   Clear bilaterally, no rales, rhonchi or wheezing  Cardiovascular:  RRR, S1/S2, no murmur/gallop/rub  Abdomen:  Soft, distended, +bowel sounds  Extremities:  Bilateral lower ankle edema  Neurological: No focal deficits  Psychological: Normal mood and affect      Medications:    [START ON 5/25/2022] warfarin  2.5 mg Oral Once per day on Mon Wed Fri    And    warfarin  5 mg Oral Once per day on Sun Tue Thu Sat    spironolactone  25 mg Oral BID    insulin glargine  15 Units SubCUTAneous Daily    insulin lispro  0-12 Units SubCUTAneous TID     insulin lispro  0-6 Units SubCUTAneous Nightly    naloxegol  25 mg Oral QAM    torsemide  20 mg Oral Daily    enoxaparin  1 mg/kg SubCUTAneous Daily    predniSONE  30 mg Oral Daily    Followed by   Obi Rosas ON 5/26/2022] predniSONE  20 mg Oral Daily    Followed by   Obi Rosas ON 5/29/2022] predniSONE  10 mg Oral Daily    piperacillin-tazobactam  3,375 mg IntraVENous Q8H    amiodarone  100 mg Oral Daily    aspirin  81 mg Oral Daily    calcitRIOL  0.25 mcg Oral Daily    levothyroxine  100 mcg Oral Daily    magnesium oxide  400 mg Oral Daily    metoprolol succinate  50 mg Oral Daily    montelukast  10 mg Oral Nightly    pantoprazole  40 mg Oral QAM AC    sodium chloride flush  5-40 mL IntraVENous 2 times per day    haloperidol lactate  2 mg IntraVENous Once    diphenhydrAMINE  25 mg IntraVENous Once      dextrose      sodium chloride Stopped (05/24/22 0032)       Lab Data:  CBC:   Recent Labs     05/22/22  0540 05/23/22  0614 05/24/22  0547   WBC 6.8 5.7 6.7   HGB 10.7* 10.3* 10.4*    202 209     BMP:    Recent Labs     05/22/22  0540 05/22/22  0540 05/23/22  0614 05/23/22  1635 05/24/22  0547     --  137  --  139   K 3.1*   < > 2.9* 3.6 4.0   CO2 27  --  25  --  23   BUN 51*  --  36*  --  32*   CREATININE 1.8*  --  1.5*  --  1.7*    < > = values in this interval not displayed. INR:    Recent Labs     05/22/22  0540 05/23/22  0614 05/24/22  0547   INR 1.63* 1.29* 1.11     BNP:    No results for input(s): PROBNP in the last 72 hours. Diagnostics:  Echo 5/6/2022  Summary   Technically difficult examination due to visualization and irregular rhythm   Normal left ventricle size.   There is moderate concentric left ventricular hypertrophy.   Ejection fraction is visually estimated to be 40%.   Overall left ventricular systolic function appears moderately reduced.   There is akinesis of the apex walls.  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   No evidence of apical thrombus by contrast administration.   A bioprosthetic mitral valve is well seated. Visually opens well. Cannot   adequately assess for dysfunction as gradients not obtained.   The left atrium is moderately dilated.   No pericardial effusion.     JUDE Preliminary 5/4/2021 Dr Charlotte Pappas  AV - area ~ 1.5, opens adequately, not severe aortic stenosis     [facility] 5/3/2021 Dr Charlotte Pappas  Artery Findings/Result   LM Normal   LAD 50% mid, 70% mid, competitive flow distal from LIMA   Cx OM1 20% ostial to prox, superior branch mid OM1 50% ostial   RI N/A   S-D-RPL patent   L-LAD patent   RCA 50-60% distal, very heavily calcified   LVEDP 15   AV Peak to peak gradient 36mmHg, valve crossed easily with pigtail. LVG NA      Intervention:         None     Post Cath Dx:       Patent grafts    Echo 4/20/21  Summary   Overall left ventricular systolic function is severely depressed .   Ejection fraction is visually estimated to be 10-15 %. E/e'= 23.2 GLS=-3.4   Moderately dilated left ventricle.   There is severe diffuse hypokinesis.   Indeterminate diastolic function.   A bioprosthetic mitral valve is well seated with peak velocity of 1.59m/s   and a mean gradient of 3mmHg.   The left atrium is moderately dilated.   Mild aortic stenosis with a peak velocity of 2.5m/s and a mean pressure   gradient of 14mmHg.   The aortic valve is thickened/calcified with decreased leaflet mobility   consistent with aortic stenosis.   Mild to moderate tricuspid regurgitation.   Estimated pulmonary artery systolic pressure is mildly to moderately   elevated at 46 mmHg assuming a right atrial pressure of 8 mmHg    Assessment:    1. Cough  2. CAD status post grafts stents 2021  3. ICD in place  4. Chronic systolic heart failure, compensated with improvement in LVEF 10-40%  5. Status post MVR  6. TORIBIO on CKD      Plan:    1. CHF nurse following for diet education, fluid restriction and daily weights  2. Nephrology following  3.   Will increase torsemide to 40 mg daily (previous dose 40 mg twice a day) plus metolazone   4. Continue metoprolol 50 mg daily  5. Continue aldactone 25 mg twice a day    [id number] Roula Looney for discharge from cardiac standpoint  She will get labs done on Tuesday next week    Discussed with patient who is agreeable with plan of care. Thank you for allowing me to participate in the care of your patient.     John Jeffries, APRN - CNS, CNS

## 2022-05-25 ENCOUNTER — CARE COORDINATION (OUTPATIENT)
Dept: CASE MANAGEMENT | Age: 77
End: 2022-05-25

## 2022-05-25 DIAGNOSIS — R05.9 COUGH: Primary | ICD-10-CM

## 2022-05-25 PROCEDURE — 1111F DSCHRG MED/CURRENT MED MERGE: CPT | Performed by: FAMILY MEDICINE

## 2022-05-25 NOTE — CARE COORDINATION
[facility] Transitions Initial Follow Up Call:    Patient will see cardiology 22 & Dr. Saji Case 22. Patient is doing well, afebrile and no pain.  reports that she does have a tear on her forearm that is bleeding. He reports that it has slowed considerably, he changed the dressing this morning. CTN explained risks of Warfarin and asked that he call PCP if bleeding is not controlled or does not stop, he agreed and verbalizes understanding. He has two daughters helping, one is a pharmD and the other is a nurse. He denies any questions or concerns at this time. CTN will continue with outreach follow up calls. Call within 2 business days of discharge: Yes    Patient: Keith Loo Patient : 1945   MRN: [id number]  Reason for Admission: PNA  Discharge Date: 22 RARS: Readmission Risk Score: 20.2 ( )      Last Discharge [address] [address]       Complaint Diagnosis Description Type Department Provider    22 Shortness of Breath Wheezing . .. ED to Hosp-Admission (Discharged) (ADMITTED) Ne Ramirez MD; Luci Valderrama. .. Spoke with:  \"Murphy\"    Transitions of Care Initial Call    Was this an external facility discharge? No Discharge Facility:     Challenges to be reviewed by the provider   Additional needs identified to be addressed with provider: No  none             Method of communication with provider : none    Advance Care Planning:   Does patient have an Advance Directive: not on file; education provided. Care Transition Nurse contacted the patient by telephone to perform post hospital discharge assessment. Verified name and  with patient as identifiers. Provided introduction to self, and explanation of the CTN role. CTN reviewed discharge instructions, medical action plan and red flags with patient who verbalized understanding.  Patient given an opportunity to ask questions and does not have any further questions or concerns at this time. Were discharge instructions available to patient? Yes. Reviewed appropriate site of care based on symptoms and resources available to patient including: PCP  Urgent care clinics  When to call 911. The patient agrees to contact the PCP office for questions related to their healthcare. Medication reconciliation was performed with patient, who verbalizes understanding of administration of home medications. Advised obtaining a 90-day supply of all daily and as-needed medications. Was patient discharged with a pulse oximeter? no    CTN provided contact information. Plan for follow-up call in 5-7 days based on severity of symptoms and risk factors. Plan for next call: symptom management-.  self management-.  follow up appointment-.           Non-face-to-face services provided:  Obtained and reviewed discharge summary and/or continuity of care documents    Care Transitions 24 Hour Call    Do you have a copy of your discharge instructions?: Yes  Do you have all of your prescriptions and are they filled?: Yes  Have you been contacted by a [address]?: No  Have you scheduled your follow up appointment?: Yes  How are you going to get to your appointment?: Car - family or friend to transport  [address]: [address]  Patient DME: Shower chair, Straight cane, Walker  Do you have support at home?: Partner/Spouse/SO  Do you feel like you have everything you need to keep you well at home?: Yes  Are you an active caregiver in your home?: No  Care Transitions Interventions         Follow Up  Future Appointments   Date Time Provider Tawana Young   6/1/2022 12:15 PM SOLEDAD Dsouza - CNS [facility]   6/6/2022 10:00 AM Elma Duncan MD [facility]   6/21/2022  8:30 AM Hany Mcfarlane CHECK [facility]   8/2/2022  8:30 AM Violeta Barnard APRN - CNS [facility]   8/8/2022 11:30 AM Sarah WHITMAN CHECK FF [facility]   8/10/2022  8:00 AM Danilo Lockwood MD [address] - DYD   9/19/2022 11:00 AM SCHEDULE, SHERYL DIAZVOL CHECK FF Card[facility]   11/7/2022 11:15 AM SCHEDULE, Joe Martins FF Cardio [facility]       Charlie Matson RN

## 2022-05-31 DIAGNOSIS — Z79.4 TYPE 2 DIABETES MELLITUS WITH STAGE 3B CHRONIC KIDNEY DISEASE, WITH LONG-TERM CURRENT USE OF INSULIN (HCC): ICD-10-CM

## 2022-05-31 DIAGNOSIS — N18.32 TYPE 2 DIABETES MELLITUS WITH STAGE 3B CHRONIC KIDNEY DISEASE, WITH LONG-TERM CURRENT USE OF INSULIN (HCC): ICD-10-CM

## 2022-05-31 DIAGNOSIS — I50.22 CHRONIC SYSTOLIC CHF (CONGESTIVE HEART FAILURE), NYHA CLASS 3 (HCC): ICD-10-CM

## 2022-05-31 DIAGNOSIS — E11.22 TYPE 2 DIABETES MELLITUS WITH STAGE 3B CHRONIC KIDNEY DISEASE, WITH LONG-TERM CURRENT USE OF INSULIN (HCC): ICD-10-CM

## 2022-05-31 LAB
ANION GAP SERPL CALCULATED.3IONS-SCNC: 16 MMOL/L (ref 3–16)
BUN BLDV-MCNC: 51 MG/DL (ref 7–20)
CALCIUM SERPL-MCNC: 9 MG/DL (ref 8.3–10.6)
CHLORIDE BLD-SCNC: 94 MMOL/L (ref 99–110)
CO2: 27 MMOL/L (ref 21–32)
CREAT SERPL-MCNC: 1.9 MG/DL (ref 0.6–1.2)
GFR AFRICAN AMERICAN: 31
GFR NON-AFRICAN AMERICAN: 26
GLUCOSE BLD-MCNC: 204 MG/DL (ref 70–99)
POTASSIUM SERPL-SCNC: 3.7 MMOL/L (ref 3.5–5.1)
PRO-BNP: 5756 PG/ML (ref 0–449)
SODIUM BLD-SCNC: 137 MMOL/L (ref 136–145)

## 2022-06-01 ENCOUNTER — OFFICE VISIT (OUTPATIENT)
Dept: CARDIOLOGY CLINIC | Age: 77
End: 2022-06-01
Payer: MEDICARE

## 2022-06-01 VITALS
BODY MASS INDEX: 29.39 KG/M2 | WEIGHT: 193.9 LBS | DIASTOLIC BLOOD PRESSURE: 68 MMHG | OXYGEN SATURATION: 98 % | HEIGHT: 68 IN | SYSTOLIC BLOOD PRESSURE: 116 MMHG | HEART RATE: 68 BPM

## 2022-06-01 DIAGNOSIS — N18.30 STAGE 3 CHRONIC KIDNEY DISEASE, UNSPECIFIED WHETHER STAGE 3A OR 3B CKD (HCC): ICD-10-CM

## 2022-06-01 DIAGNOSIS — I95.9 HYPOTENSION, UNSPECIFIED HYPOTENSION TYPE: ICD-10-CM

## 2022-06-01 DIAGNOSIS — Z95.810 ICD (IMPLANTABLE CARDIOVERTER-DEFIBRILLATOR) IN PLACE: ICD-10-CM

## 2022-06-01 DIAGNOSIS — I35.0 NONRHEUMATIC AORTIC VALVE STENOSIS: ICD-10-CM

## 2022-06-01 DIAGNOSIS — I50.22 CHRONIC SYSTOLIC CHF (CONGESTIVE HEART FAILURE), NYHA CLASS 3 (HCC): Primary | ICD-10-CM

## 2022-06-01 DIAGNOSIS — Z95.1 S/P CABG X 3: ICD-10-CM

## 2022-06-01 DIAGNOSIS — I48.0 PAF (PAROXYSMAL ATRIAL FIBRILLATION) (HCC): ICD-10-CM

## 2022-06-01 LAB
ESTIMATED AVERAGE GLUCOSE: 154.2 MG/DL
HBA1C MFR BLD: 7 %

## 2022-06-01 PROCEDURE — 1123F ACP DISCUSS/DSCN MKR DOCD: CPT | Performed by: CLINICAL NURSE SPECIALIST

## 2022-06-01 PROCEDURE — 99496 TRANSJ CARE MGMT HIGH F2F 7D: CPT | Performed by: CLINICAL NURSE SPECIALIST

## 2022-06-01 RX ORDER — TORSEMIDE 20 MG/1
TABLET ORAL
Qty: 360 TABLET | Refills: 0
Start: 2022-06-01 | End: 2022-08-02 | Stop reason: DRUGHIGH

## 2022-06-01 NOTE — PROGRESS NOTES
[facility]  Progress Note    Primary Care Doctor:  Keena Flores MD    Chief Complaint   Patient presents with    Congestive Heart Failure        History of Present Illness:  77 y.o. female with history of CAD/CABG in 2018, PAF with maze , HTN, HLD, DVT/PE on coumadin, 2 CVs unsuccessful  -3/10/21 for small bowel pneumatosis with loculated pneumoperitoneum (IV zoysn and TPN), acute on chronic sHF (torsemide decreased at discharge, aldactone was held and coreg dose decreased due to hypotension), TORIBIO on CKD, PAF  ICD placed 2021  covid (antibodies, steroids and antibiotics). She was on prednisone  to  (off for 2 days). I had the pleasure of seeing Jazmin Michel in follow up for sHF and hospitalization - for persistent cough, chest congestion pneumonia after failed po rounds of antibiotics, CT abdomen with pneumatosis and mesenteric gas and inflammation started on antibiotics which she has a couple days left. She is in a wheel chair with her , Lissett Riding. She had labs done this week which show she is on the dry side. Unable to obtain her optival today due to machine issues. She feels good but still with a cough mainly at night. Her weight at home is 192 (baseline is 199). She denies any chest pain, palpitations, lightheadedness or edema. Her abdomen is improved and having BMs. Phone call 22    Past Medical History:   has a past medical history of Asthma, Atrial fibrillation ([address] Eosinophilia, Hemoptysis, HIGH CHOLESTEROL, Hx of blood clots, Hypertension, Irregular heart beat, Other specified gastritis without mention of hemorrhage, Palpitations, Skin cancer, and Type II or unspecified type diabetes mellitus without mention of complication, not stated as uncontrolled. Surgical History:   has a past surgical history that includes Cholecystectomy;  section; Colonoscopy (2017); skin biopsy; bronchoscopy (2016);  Coronary artery bypass graft (08/21/2018); Mitral valve replacement (08/21/2018); transesophageal echocardiogram (08/21/2018); Tunneled venous catheter placement (Left, 08/23/2018); Cardiac catheterization (08/16/2018); Insertable Cardiac Monitor (Left, 08/16/2018); Colonoscopy (01/17/2014); Upper gastrointestinal endoscopy (N/A, 10/10/2018); Colonoscopy (10/10/2018); Colonoscopy (N/A, 8/7/2020); Pain management procedure (N/A, 12/18/2020); Pain management procedure (Bilateral, 1/18/2021); and Cardiac catheterization (5/2 and 5/3 2021). Social History:   reports that she has never smoked. She has never used smokeless tobacco. She reports that she does not drink alcohol and does not use drugs. Family History:   Family History   Problem Relation Age of Onset    Cancer Father     Asthma Mother     Hypertension Mother     Heart Disease Mother     High Blood Pressure Mother        Home Medications:  Prior to Admission medications    Medication Sig Start Date End Date Taking?  Authorizing Provider   torsemide (DEMADEX) 20 MG tablet 40 mg in am and 20 mg in pm 6/1/22  Yes Oneil Graves APRN - CNS   guaiFENesin-dextromethorphan (ROBITUSSIN DM) 100-10 MG/5ML syrup Take 5 mLs by mouth every 4 hours as needed for Cough 5/23/22 6/2/22 Yes Keanu Sosa MD   amoxicillin-clavulanate (AUGMENTIN) 500-125 MG per tablet Take 1 tablet by mouth 3 times daily for 10 days 5/23/22 6/2/22 Yes Keanu Sosa MD   calcitRIOL (ROCALTROL) 0.25 MCG capsule Take 0.25 mcg by mouth daily  5/4/22  Yes Historical Provider, MD   insulin lispro, 1 Unit Dial, (HUMALOG KWIKPEN) 100 UNIT/ML SOPN 5 units plusTID Insulin dosage per Sliding scale-140-199-2 units, 200-249- 3 units, 250-300-4 units, >300 take 5 units, max dose per day of 50 units 5/6/22  Yes Ludin Hook MD   metOLazone (ZAROXOLYN) 2.5 MG tablet TAKE 1 TABLET ON TUESDAY AND FRIDAY AND AS DIRECTED 5/3/22  Yes Violeta Moss, APRN - CNS   spironolactone (ALDACTONE) 25 MG tablet TAKE 1 TABLET TWICE A DAY 4/21/22  Yes SOLEDAD Babcock   metoprolol succinate (TOPROL XL) 50 MG extended release tablet TAKE 1 TABLET DAILY 4/20/22  Yes Saray Quiles MD   vitamin D (ERGOCALCIFEROL) 1.25 MG (85810 UT) CAPS capsule TAKE ONE CAPSULE BY MOUTH ONCE WEEKLY 3/24/22  Yes SOLEDAD Peck - CNP   amiodarone (CORDARONE) 200 MG tablet TAKE 1 TABLET DAILY  Patient taking differently: Take 100 mg by mouth daily  3/10/22  Yes Aman Case MD   levothyroxine (SYNTHROID) 100 MCG tablet Take 1 tablet by mouth daily 2/22/22  Yes Saray Quiles MD   insulin glargine (LANTUS SOLOSTAR) 100 UNIT/ML injection pen INJECT 20 UNITS IN THE MORNING  Patient taking differently: 22 Units INJECT 20 UNITS IN THE MORNING 11/22/21  Yes Saray Quiles MD   albuterol sulfate  (90 Base) MCG/ACT inhaler USE 2 INHALATIONS EVERY 6 HOURS AS NEEDED FOR WHEEZING 11/19/21  Yes Saray Quiles MD   ipratropium-albuterol (DUONEB) 0.5-2.5 (3) MG/3ML SOLN nebulizer solution Inhale 3 mLs into the lungs every 4 hours as needed for Shortness of Breath 11/15/21  Yes Saray Quiles MD   midodrine (PROAMATINE) 2.5 MG tablet TAKE ONE TABLET BY MOUTH TWICE A DAY  Patient taking differently: as needed  9/16/21  Yes SOLEDAD Beckwith   oxyCODONE (ROXICODONE) 5 MG immediate release tablet Take 0.5 mg by mouth daily. Yes Historical Provider, MD   montelukast (SINGULAIR) 10 MG tablet TAKE 1 TABLET NIGHTLY 8/3/21  Yes Saray Quiles MD   potassium chloride (KLOR-CON M) 10 MEQ extended release tablet TAKE 1 TABLET DAILY 8/3/21  Yes Saray Quiles MD   warfarin (COUMADIN) 5 MG tablet TAKE 1 TABLET DAILY  Patient taking differently: Review INR prior to administration.   Managed by Dr. Indu Matson 8/3/21  Yes Saray Quiles MD   magnesium oxide (MAG-OX) 400 MG tablet Take 1 tablet by mouth daily 5/10/21  Yes Elizabeth Zhang MD   ACETAMINOPHEN PO Take 500 mg by mouth every 6 hours as needed    Yes Historical Provider, MD   albuterol (PROVENTIL) (2.5 MG/3ML) 0.083% nebulizer solution Take 3 mLs by nebulization every 6 hours as needed for Wheezing 6/2/20  Yes Ashly Frank MD   omeprazole (PRILOSEC) 20 MG delayed release capsule Take 20 mg by mouth daily   Yes Historical Provider, MD   ondansetron (ZOFRAN) 4 MG tablet Take 1 tablet by mouth 3 times daily as needed for Nausea or Vomiting 3/26/20  Yes Ashly Frank MD   aspirin 81 MG chewable tablet Take 1 tablet by mouth daily 6/7/19  Yes Ashly Frank MD   vitamin B-12 500 MCG tablet Take 1 tablet by mouth daily 10/12/18  Yes Sherron Santamaria MD   nystatin (MYCOSTATIN) 734505 UNIT/ML suspension TAKE ONE TEASPOONFUL BY MOUTH FOUR TIMES A DAY FOR 5 DAYS 2/22/22   Ashly Frank MD   blood glucose test strips (FREESTYLE LITE) strip USE FOUR TIMES A DAY 2/15/22   Ashly Frank MD   FreeStyle Lancets MISC 1 each by Does not apply route 4 times daily 9/7/21   Ashly Frank MD   Blood Glucose Monitoring Suppl (FREESTYLE LITE) GAETANO 1 Device by Does not apply route 4 times daily    Historical Provider, MD   Insulin Pen Needle (BD PEN NEEDLE JANNET U/F) 32G X 4 MM MISC USE 1 PEN NEEDLE FOUR TIMES A DAY 6/14/21   Ashly Frank MD   polyethylene glycol (GLYCOLAX) 17 GM/SCOOP powder Take 17 g by mouth daily     Historical Provider, MD        Allergies:  Efmwkbuv-exysyfa-mllwht [fluocinolone], Ciprofloxacin, Diovan [valsartan], Flagyl [metronidazole], Metformin and related [metformin and related], Benazepril, Morphine, Saxagliptin, and Levaquin [levofloxacin]     Review of Systems:   · Constitutional: there has been no unanticipated weight loss. There's been no change in energy level, sleep pattern, or activity level. · Eyes: No visual changes or diplopia. No scleral icterus. · ENT: No Headaches, hearing loss or vertigo. No mouth sores or sore throat. · Cardiovascular: Reviewed in HPI  · Respiratory: No cough or wheezing, no sputum production.  No hematemesis. · Gastrointestinal: No abdominal pain, appetite loss, blood in stools. No change in bowel or bladder habits. · Genitourinary: No dysuria, trouble voiding, or hematuria. · Musculoskeletal:  No gait disturbance, weakness or joint complaints. · Integumentary: No rash or pruritis. · Neurological: No headache, diplopia, change in muscle strength, numbness or tingling. No change in gait, balance, coordination, mood, affect, memory, mentation, behavior. · Psychiatric: No anxiety, no depression. · Endocrine: No malaise, fatigue or temperature intolerance. No excessive thirst, fluid intake, or urination. No tremor. · Hematologic/Lymphatic: No abnormal bruising or bleeding, blood clots or swollen lymph nodes. · Allergic/Immunologic: No nasal congestion or hives. Physical Examination:    Vitals:    06/01/22 1224   BP: 116/68   Pulse: 68   SpO2: 98%   Weight: 193 lb 14.4 oz (88 kg)   Height: 5' 8\" (1.727 m)        Constitutional and General Appearance: Warm and dry, no apparent distress, normal coloration  HEENT:  Normocephalic, atraumatic  Respiratory:  · Normal excursion and expansion without use of accessory muscles  · Resp Auscultation: Normal breath sounds without dullness  Cardiovascular:  · The apical impulses not displaced  · Heart tones are crisp and normal  · JVP normal cm H2O  · regular rate and rhythm  · Peripheral pulses are symmetrical and full  · There is no clubbing, cyanosis of the extremities.   · No ankle edema  · Pedal Pulses: 2+ and equal   Abdomen:   · No masses or tenderness  · Liver/Spleen: No Abnormalities Noted  Neurological/Psychiatric:  · Alert and oriented in all spheres  · Moves all extremities well  · Exhibits normal gait balance and coordination  · No abnormalities of mood, affect, memory, mentation, or behavior are noted    Lab Data:    CBC:   Lab Results   Component Value Date    WBC 6.7 05/24/2022    WBC 5.7 05/23/2022    WBC 6.8 05/22/2022    RBC 3.65 05/24/2022 RBC 3.54 05/23/2022    RBC 3.66 05/22/2022    HGB 10.4 05/24/2022    HGB 10.3 05/23/2022    HGB 10.7 05/22/2022    HCT 32.6 05/24/2022    HCT 31.6 05/23/2022    HCT 32.2 05/22/2022    MCV 89.4 05/24/2022    MCV 89.2 05/23/2022    MCV 88.0 05/22/2022    RDW 18.0 05/24/2022    RDW 17.8 05/23/2022    RDW 17.6 05/22/2022     05/24/2022     05/23/2022     05/22/2022     BMP:  Lab Results   Component Value Date     05/31/2022     05/24/2022     05/23/2022    K 3.7 05/31/2022    K 4.0 05/24/2022    K 3.6 05/23/2022    K 4.0 05/18/2022    K 3.5 04/21/2021    K 3.7 04/20/2021    CL 94 05/31/2022     05/24/2022     05/23/2022    CO2 27 05/31/2022    CO2 23 05/24/2022    CO2 25 05/23/2022    PHOS 2.6 05/24/2022    PHOS 2.5 05/23/2022    PHOS 3.6 05/22/2022    BUN 51 05/31/2022    BUN 32 05/24/2022    BUN 36 05/23/2022    CREATININE 1.9 05/31/2022    CREATININE 1.7 05/24/2022    CREATININE 1.5 05/23/2022     BNP:   Lab Results   Component Value Date    PROBNP 5,756 05/31/2022    PROBNP 3,211 05/17/2022    PROBNP 2,814 04/28/2022     Cardiac Imaging:  Echo 5/6/2022  Summary   Technically difficult examination due to visualization and irregular rhythm   Normal left ventricle size. There is moderate concentric left ventricular hypertrophy. Ejection fraction is visually estimated to be 40%. Overall left ventricular systolic function appears moderately reduced. There is akinesis of the apex walls. 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   No evidence of apical thrombus by contrast administration. A bioprosthetic mitral valve is well seated. Visually opens well. Cannot   adequately assess for dysfunction as gradients not obtained. The left atrium is moderately dilated. No pericardial effusion.      [facility] Preliminary 5/4/2021 Dr Gaurav Ch  AV - area ~ 1.5, opens adequately, not severe aortic stenosis     [facility] 5/3/2021 Dr Gaurav Ch  Artery Findings/Result   LM Normal   LAD 50% mid, 70% mid, competitive flow distal from LIMA   Cx OM1 20% ostial to prox, superior branch mid OM1 50% ostial   RI N/A   S-D-RPL patent   L-LAD patent   RCA 50-60% distal, very heavily calcified   LVEDP 15   AV Peak to peak gradient 36mmHg, valve crossed easily with pigtail. LVG NA      Intervention:         None     Post Cath Dx:       Patent grafts     Echo 4/20/21  Summary   Overall left ventricular systolic function is severely depressed .   Ejection fraction is visually estimated to be 10-15 %. E/e'= 23.2 GLS=-3.4   Moderately dilated left ventricle.   There is severe diffuse hypokinesis.   Indeterminate diastolic function.   A bioprosthetic mitral valve is well seated with peak velocity of 1.59m/s   and a mean gradient of 3mmHg.   The left atrium is moderately dilated.   Mild aortic stenosis with a peak velocity of 2.5m/s and a mean pressure   gradient of 14mmHg.   The aortic valve is thickened/calcified with decreased leaflet mobility   consistent with aortic stenosis.   Mild to moderate tricuspid regurgitation.   Estimated pulmonary artery systolic pressure is mildly to moderately   elevated at 46 mmHg assuming a right atrial pressure of 8 mmHg    11/30/2020 Dobutamine Echo   Dobutamine echocardiogram for aortic stenosis.   Very technically limited exam due to atrial fibrillation.   Baseline echocardiogram shows severe left ventricular dysfunction with   anterior, lateral and apical hypokinesis with an ejection fraction of 20-25%.   There is no improvement in wall motion with low or high dose dobutamine   suggestive of nonviable myocardium.      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 After dobutamine stress the mean AV   gradient rises to 20mmHg with 20mcg of dobutamine consistent with mild to   moderate aortic stenosis.   Prosthetic mitral valve with a mean gradient of 7mmHg        [facility] 10/21/2020  Mildly dilated left ventricular size and normal wall thickness. Global left ventricular function is severely decreased with ejection fraction estimated at 25%. Patient is in atrial fibrillation during procedure.      The bioprosthetic mitral valve is well seated with a mean gradient of 5mmHg and maximum pressure gradient of 15 mmHg with heart rate of 89 bpm. Pressure half time of 82 ms. There is trivial mitral regurgitation.      Left atrial enlargement. Spontaneous echo contrast seen in the left atrium. A large stump of the left atrial appendage with no thrombus noted. Patient has history of surgical ligation of the left atrial appendage. There are spontaneous echo contrast in the atrial appendage.      The aortic valve is thickened/calcified with decreased leaflet mobility consistent with aortic stenosis. There is trivial aortic insufficiency.      There is moderate tricuspid regurgitation.     ECHO 9/15/20  Left ventricular cavity size is dilated. There is normal wall thickness with a moderately severe reduction in   systolic function.   LV ejection fraction is visually estimated to be 25-30%.   Indeterminate diastolic function.   The right ventricle is not well visualized but appears to be normal in size with moderately reduced function.   The left atrium is moderately dilated.   A bioprosthetic mitral valve with a mean gradient of 7 mmHg. This may be a normal gradient for this valve but could suggest mild stenosis.   Trivial mitral regurgitation.   The aortic valve is thickened/calcified with a mean gradient of 16mmHg consistent with at least mild aortic stenosis. This is likely underestimated due to low cardiac output secondary to LV dysfunction.   No significant aortic valve regurgitation.   Moderate tricuspid regurgitation with RVSP of 48 mmHg.     [facility] 11/1/2019  Normal left ventricular cavity size and wall thickness. Global left ventricular function is moderate-to-severely decreased with ejection fraction estimated from 35% to 40%.  Severe apical akinesis noted.      The bioprosthetic mitral valve is well seated with a mean gradient of 2mmHg and maximum pressure gradient of 5 mmHg. There is trivial mitral regurgitation.      Left atrial enlargement. Spontaneous echo contrast seen in the left atrium.   Stump of the left atrial appendage noted with no thrombus.      The aortic valve is thickened/calcified with decreased leaflet mobility consistent with aortic stenosis. There is trivial aortic insufficiency    Assessment:    1. Chronic systolic heart failure due to valvular disease ([facility]) BB and aldosterone antagonist; no ace/arb due ckd   2. PAF (paroxysmal atrial fibrillation) ([facility]) on coumadin in nsr   3. S/P CABG x 3    4. Nonrheumatic aortic valve stenosis    5. Hypotension on midodrine   6. Chronic kidney disease   7. ICD in place    Plan:   Patient Instructions   1. Cut torsemide to 40 mg in am and 20 mg in pm  2. Continue all other medications  3. Check blood work in 2 weeks  4.   RTO in 6 weeks      I appreciate the opportunity of cooperating in the care of this individual.    SOLEDAD Li - CNS, CNS, 6/1/2022, 3:26 PM

## 2022-06-01 NOTE — PATIENT INSTRUCTIONS
1.  Cut torsemide to 40 mg in am and 20 mg in pm  2. Continue all other medications  3. Check blood work in 2 weeks  4.   RTO in 6 weeks

## 2022-06-05 NOTE — DISCHARGE SUMMARY
[address] [place] [address] DISCHARGE SUMMARY    Patient Demographics    Patient. Natty Blancas  Date of Birth. 1945  MRN. [id number]     Primary care provider. Loren Pearson MD  (Tel: [phone])    Admit date: 5/17/2022    Discharge date (blank if same as Note Date): 5/24/2022  Note Date: 6/5/2022     Reason for Hospitalization. Chief Complaint   Patient presents with    Shortness of Breath     Cough and shortness of breath x 8 days, advised by Dr. Gordo Sewell office to come to emergency room. [facility]. Pneumonia  -With failed outpatient therapy. With multiple rounds. Patient is treated with IV antibiotics and transition to p.o. antibiotics on discharge      Acute on chronic kidney disease improved. Chronic diastolic CHF evaluated by cardiology medication adjusted      Abdominal pain with abnormal CT finding was eval by general surgery with some concern for pneumobilia. But significant improved with conservative management. Patient was discharged stable    Consults. IP CONSULT TO NEPHROLOGY  IP CONSULT TO PHARMACY  IP CONSULT TO CARDIOLOGY  IP CONSULT TO DIABETES EDUCATOR  IP CONSULT TO GENERAL SURGERY    Physical examination on discharge day. /71   Pulse 69   Temp 97.5 °F (36.4 °C) (Oral)   Resp 18   Ht 5' 8\" (1.727 m)   Wt 208 lb 11.2 oz (94.7 kg)   SpO2 98%   BMI 31.73 kg/m²   General appearance. Alert. Looks comfortable. HEENT. Sclera clear. Moist mucus membranes. Cardiovascular. Regular rate and rhythm, normal S1, S2. No murmur. Respiratory. Not using accessory muscles. Clear to auscultation bilaterally, no wheeze. Gastrointestinal. Abdomen soft, non-tender, not distended, normal bowel sounds  Neurology. Facial symmetry. No speech deficits. Moving all extremities equally. Extremities. No edema in lower extremities. Skin.  Warm, dry, normal Albuterol is used to treat respiratory problems:  Common side effects include:  Nervousness, headache, palpitations, nausea, muscle cramps, insomnia     aspirin 81 MG chewable tablet  Take 1 tablet by mouth daily  Notes to patient: Aspirin  Use: Prevents heart attacks & strokes. Side effects: bleeding or bruising more easily, upset stomach.      BD Pen Needle Pauline U/F 32G X 4 MM Misc  Generic drug: Insulin Pen Needle  USE 1 PEN NEEDLE FOUR TIMES A DAY     calcitRIOL 0.25 MCG capsule  Commonly known as: ROCALTROL  Notes to patient: Use: Vitamin D3 supplement to aid in the absorption of calcium, used in treatment of hyperactive parathyroid glands and chronic renal failure  Side effects: stomach upset, abdominal cramping, weakness, fatigue, muscle and bone pain  *Notify your physician if side effects persist     cyanocobalamin 500 MCG tablet  Take 1 tablet by mouth daily  Notes to patient: Use: Vitamin B 12  Side effects: anxiety, dizziness, head ache     FreeStyle Lancets Misc  1 each by Does not apply route 4 times daily     FreeStyle Lite Mei     FREESTYLE LITE strip  Generic drug: blood glucose test strips  USE FOUR TIMES A DAY     insulin lispro (1 Unit Dial) 100 UNIT/ML Sopn  Commonly known as: HumaLOG KwikPen  5 units plusTID Insulin dosage per Sliding scale-140-199-2 units, 200-249- 3 units, 250-300-4 units, >300 take 5 units, max dose per day of 50 units  Notes to patient: Insulin Lispro (Humalog*)  Use: high blood sugar  Side effects: low blood sugar, irritation at injection site     ipratropium-albuterol 0.5-2.5 (3) MG/3ML Soln nebulizer solution  Commonly known as: DUONEB  Inhale 3 mLs into the lungs every 4 hours as needed for Shortness of Breath  Notes to patient: Albuterol/Ipratropium (Duo Neb*)  Use: to open airways, reduce secretions   Side Effects: nervousness, jitteriness, shakiness, headache, fast heartbeat     levothyroxine 100 MCG tablet  Commonly known as: SYNTHROID  Take 1 tablet by mouth daily  Notes to patient: Use: to treat underactive thyroid, thyroid hormone replacement  Side effects: rapid or irregular heartbeat, fatigue, difficulty swallowing, irritability      magnesium oxide 400 MG tablet  Commonly known as: MAG-OX  Take 1 tablet by mouth daily     metOLazone 2.5 MG tablet  Commonly known as: ZAROXOLYN  TAKE 1 TABLET ON TUESDAY AND FRIDAY AND AS DIRECTED  Notes to patient:  For fluid management \"water pill\"  Side effects: drowsiness, hypokalemia, gout attacks, joint pain     metoprolol succinate 50 MG extended release tablet  Commonly known as: TOPROL XL  TAKE 1 TABLET DAILY  Notes to patient: Metoprolol (Lopressor, Toprol XL)  Use: treat heart failure, prevent future heart attacks, lower blood pressure and heart rate, treat chest pain   Side effects: dizziness, fatigue, diarrhea, and depression     montelukast 10 MG tablet  Commonly known as: SINGULAIR  TAKE 1 TABLET NIGHTLY  Notes to patient: Use: for prevention of asthma attacks  Side effects: stomach upset, general body aches and flu-like symptoms, headache, chest tightness     nystatin 382091 UNIT/ML suspension  Commonly known as: MYCOSTATIN  TAKE ONE TEASPOONFUL BY MOUTH FOUR TIMES A DAY FOR 5 DAYS     omeprazole 20 MG delayed release capsule  Commonly known as: PRILOSEC  Notes to patient: Proton pump inhibitor  Use: Treatment of ulcers, heartburn,  GI reflux  Side Effects: Headache, nausea, flatulence     ondansetron 4 MG tablet  Commonly known as: ZOFRAN  Take 1 tablet by mouth 3 times daily as needed for Nausea or Vomiting  Notes to patient: Ondansetron (Zofran*)  Use: nausea and vomiting  Side effects: headache, weakness or dizziness     oxyCODONE 5 MG immediate release tablet  Commonly known as: ROXICODONE  Notes to patient: Oxycodone (Oxeta*, Oxycontin, Roxicodone)  Use: Pain control  Side effects: drowsiness, dizziness, constipation, nausea, and vomiting     polyethylene glycol 17 GM/SCOOP powder  Commonly known as: GLYCOLAX  Notes to patient: Use: to treat or prevent constipation  Side effects: nausea, abdominal bloating, diarrhea, flatulence      potassium chloride 10 MEQ extended release tablet  Commonly known as: KLOR-CON M  TAKE 1 TABLET DAILY  Notes to patient: Use: It is used to treat or prevent low potassium levels. Side effects: Belly pain. Upset stomach or throwing up. Loose stools (diarrhea). Gas. spironolactone 25 MG tablet  Commonly known as: ALDACTONE  TAKE 1 TABLET TWICE A DAY  Notes to patient: Use: fluid/volume management   Side effects: elevated potassium, increase urination     vitamin D 1.25 MG (75211 UT) Caps capsule  Commonly known as: ERGOCALCIFEROL  TAKE ONE CAPSULE BY MOUTH ONCE WEEKLY  Notes to patient: Supplement  Use: Prophylaxis and treatment of vitamin D depletion  Side Effects: Muscle weakness/twitching, fatigue, constipation, upset stomach         * This list has 2 medication(s) that are the same as other medications prescribed for you. Read the directions carefully, and ask your doctor or other care provider to review them with you. STOP taking these medications    azithromycin 500 MG tablet  Commonly known as: Zithromax     predniSONE 10 MG tablet  Commonly known as: DELTASONE     torsemide 20 MG tablet  Commonly known as: [address] your doctor about these medications    amoxicillin-clavulanate 500-125 MG per tablet  Commonly known as: Augmentin  Take 1 tablet by mouth 3 times daily for 10 days  Ask about: Should I take this medication? guaiFENesin-dextromethorphan 100-10 MG/5ML syrup  Commonly known as: ROBITUSSIN DM  Take 5 mLs by mouth every 4 hours as needed for Cough  Ask about: Should I take this medication?     predniSONE 20 MG tablet  Commonly known as: DELTASONE  Take 1 tablet by mouth daily for 3 doses  Ask about: Should I take this medication?            Where to Get Your Medications      These medications were sent to [facility] [id number] - [facility], [address], [address] [place] [address]    Phone: [phone]   · amoxicillin-clavulanate 500-125 MG per tablet  · guaiFENesin-dextromethorphan 100-10 MG/5ML syrup  · predniSONE 20 MG tablet         Discharge recommendations given to patient. Follow Up. pcp in 1 week   Disposition. home  Activity. activity as tolerated  Diet: No diet orders on file      Spent 45  minutes in discharge process.     Signed:  Taylor Corbett MD     6/5/2022 4:29 PM

## 2022-06-06 ENCOUNTER — OFFICE VISIT (OUTPATIENT)
Dept: SURGERY | Age: 77
End: 2022-06-06
Payer: MEDICARE

## 2022-06-06 VITALS — DIASTOLIC BLOOD PRESSURE: 70 MMHG | BODY MASS INDEX: 31.02 KG/M2 | WEIGHT: 204 LBS | SYSTOLIC BLOOD PRESSURE: 122 MMHG

## 2022-06-06 DIAGNOSIS — K63.89 PNEUMATOSIS INTESTINALIS: Primary | ICD-10-CM

## 2022-06-06 PROCEDURE — 99213 OFFICE O/P EST LOW 20 MIN: CPT | Performed by: SURGERY

## 2022-06-06 PROCEDURE — 1123F ACP DISCUSS/DSCN MKR DOCD: CPT | Performed by: SURGERY

## 2022-06-06 NOTE — PATIENT INSTRUCTIONS
1.  Antibiotics complete, does not need additional coverage  2. Abdominal symptoms at baseline, no further acute work-up necessary  3. Diet and activity as tolerated  4. Continue medical management of CHF and constipation per primary and consulting teams  5.   Follow general surgery as needed

## 2022-06-06 NOTE — PROGRESS NOTES
[facility]  SUBJECTIVE:    Chief Complaint: abdominal pain    Paresh Zaragoza   1945   77 y.o. female presents for follow-up after recent hospital admission for pneumonia and then later abdominal pain. The abdominal pain has decreased back to baseline. She is tolerated diet. Bowels with some constipation improved with laxatives. We will need to stop the MiraLAX and transition to likely Colace secondary to renal issues. Denies fevers chills change in appetite weight dysuria or other complaints. Does have some abdominal swelling due to adjustment in her \"water pills \"for which she has gained some weight since admission. While in the hospital CT scan showed small bowel pneumatosis mesenteric gas left upper quadrant similar to prior exams with a small amount of mesenteric edema in concerning for inflammation with underlying jejunal diverticulosis. Similar to prior imaging. Surgical history includes cholecystectomy hysterectomy and . Medical history includes chronic renal failure coronary artery disease CHF atrial fibrillation and medical coagulopathy for which she is on Coumadin.   Improved with conservative management and pain currently at baseline, has chronic back pain for which she takes daily narcotics      Past Medical History:   Diagnosis Date    Asthma     Atrial fibrillation (HCC)     Eosinophilia     Hemoptysis     HIGH CHOLESTEROL     Hx of blood clots     Hypertension     Irregular heart beat     Other specified gastritis without mention of hemorrhage     Palpitations     Skin cancer     basal and squamous    Type II or unspecified type diabetes mellitus without mention of complication, not stated as uncontrolled      Past Surgical History:   Procedure Laterality Date    BRONCHOSCOPY  2016    Dr. Esmer Hope - brushings from [address] [place] [address],[facility] [place]  2018    Dr. Braydon Mccarty and 5/3 2021    Cardiac cath, cardioversion and rafat     SECTION      CHOLECYSTECTOMY      COLONOSCOPY  2017    Dr. Gosia Zhou - sigmoid diverticulosis, polypectomies x3    COLONOSCOPY  2014    Dr. Gosia Zhou - sigmoid diverticulosis, polypectomies x3, internal hemorrhoids    COLONOSCOPY  10/10/2018    w/biopsy performed by Barb Vogel MD at [facility] COLONOSCOPY N/A 2020    COLONOSCOPY DIAGNOSTIC performed by Deric Mayorga MD at [address] GRAFT  2018    Dr. Camila Rosas - x3 (LIMA-LAD, L SV-D1-PLV) modified BL MAZE procedure w/obliteration of WAYNE using 45mm AtriClip    INSERTABLE CARDIAC MONITOR Left 2018    Dr. Fanny Cox - Mehnaz Pierce SN# [id number] Medtronic    MITRAL VALVE REPLACEMENT  2018    Dr. Camila Rosas - 27mm Medtronic Cinch tissue valve    PAIN MANAGEMENT PROCEDURE N/A 2020    C6-C7 MIDLINE  EPIDURAL STEROID INJECTION WITH FLUOROSCOPY performed by Jacinda Amador MD at [address] Bilateral 2021    BILATERAL T11 TRANSFORAMINAL EPIDURAL STEROID INJECTION WITH FLUOROSCOPY performed by Jacinda Amador MD at [address] TRANSESOPHAGEAL ECHOCARDIOGRAM  2018    during CABG/MVR    TUNNELED VENOUS CATHETER PLACEMENT Left 2018    Dr. Gaby Morelos -  for HD---since removed    UPPER GASTROINTESTINAL ENDOSCOPY N/A 10/10/2018    w/biopsy [facility] Mar[facility] status:      Spouse name: Not on file    Number of children: Not on file    Years of education: Not on file    Highest education level: Not on file   Occupational History    Not on file   Tobacco Use    Smoking status: Never Smoker    Smokeless tobacco: Never Used   Vaping Use    Vaping Use: Never used   Substance and Sexual Activity    Alcohol use: No    Drug use: No    Sexual activity: Not on file   Other Topics Concern    Not on file   Social History Narrative    Not on file     Social Determinants of Health     Financial Resource Strain: Low Risk     Difficulty of Paying Living Expenses: Not hard at all   Food Insecurity: No Food Insecurity    Worried About Running Out of Food in the Last Year: Never true    [address] in the Last Year: Never true   Transportation Needs:     Lack of Transportation (Medical): Not on file    Lack of Transportation (Non-Medical):  Not on file   Physical Activity:     Days of Exercise per Week: Not on file    Minutes of Exercise per Session: Not on file   Stress:     Feeling of Stress : Not on file   Social Connections:     Frequency of Communication with Friends and Family: Not on file    Frequency of Social Gatherings with Friends and Family: Not on file    Attends [employer] Services: Not on file    Active Member of [address] [place] [address] [employer] or Organizations: Not on file    Attends Club or Organization Meetings: Not on file    Marital Status: Not on file   Intimate Partner Violence:     Fear of Current or Ex-Partner: Not on file    Emotionally Abused: Not on file    Physically Abused: Not on file    Sexually Abused: Not on file   Housing Stability:     Unable to Pay for Housing in the Last Year: Not on file    Number of [place] in the Last Year: Not on file    Unstable Housing in the Last Year: Not on file      Family History   Problem Relation Age of Onset    Cancer Father     Asthma Mother     Hypertension Mother     Heart Disease Mother     High Blood Pressure Mother      Current Outpatient Medications   Medication Sig Dispense Refill    torsemide (DEMADEX) 20 MG tablet 40 mg in am and 20 mg in pm 360 tablet 0    calcitRIOL (ROCALTROL) 0.25 MCG capsule Take 0.25 mcg by mouth daily       insulin lispro, 1 Unit Dial, (HUMALOG KWIKPEN) 100 UNIT/ML SOPN 5 units plusTID Insulin dosage per Sliding scale-140-199-2 units, 200-249- 3 units, 250-300-4 units, >300 take 5 units, max dose per day of 50 units 15 mL 2    metOLazone (ZAROXOLYN) 2.5 MG tablet TAKE 1 TABLET ON TUESDAY AND FRIDAY AND AS DIRECTED 30 tablet 1    spironolactone (ALDACTONE) 25 MG tablet TAKE 1 TABLET TWICE A  tablet 1    metoprolol succinate (TOPROL XL) 50 MG extended release tablet TAKE 1 TABLET DAILY 90 tablet 0    vitamin D (ERGOCALCIFEROL) 1.25 MG (65381 UT) CAPS capsule TAKE ONE CAPSULE BY MOUTH ONCE WEEKLY 12 capsule 1    amiodarone (CORDARONE) 200 MG tablet TAKE 1 TABLET DAILY (Patient taking differently: Take 100 mg by mouth daily ) 60 tablet 5    nystatin (MYCOSTATIN) 586203 UNIT/ML suspension TAKE ONE TEASPOONFUL BY MOUTH FOUR TIMES A DAY FOR 5 DAYS 120 mL 1    levothyroxine (SYNTHROID) 100 MCG tablet Take 1 tablet by mouth daily 90 tablet 1    blood glucose test strips (FREESTYLE LITE) strip USE FOUR TIMES A  strip 3    insulin glargine (LANTUS SOLOSTAR) 100 UNIT/ML injection pen INJECT 20 UNITS IN THE MORNING (Patient taking differently: 22 Units INJECT 20 UNITS IN THE MORNING) 90 mL 1    albuterol sulfate  (90 Base) MCG/ACT inhaler USE 2 INHALATIONS EVERY 6 HOURS AS NEEDED FOR WHEEZING 25.5 g 2    ipratropium-albuterol (DUONEB) 0.5-2.5 (3) MG/3ML SOLN nebulizer solution Inhale 3 mLs into the lungs every 4 hours as needed for Shortness of Breath 120 each 0    midodrine (PROAMATINE) 2.5 MG tablet TAKE ONE TABLET BY MOUTH TWICE A DAY (Patient taking differently: as needed ) 60 tablet 5    FreeStyle Lancets MISC 1 each by Does not apply route 4 times daily 200 each 5    Blood Glucose Monitoring Suppl (FREESTYLE LITE) GAETANO 1 Device by Does not apply route 4 times daily      oxyCODONE (ROXICODONE) 5 MG immediate release tablet Take 0.5 mg by mouth daily.       montelukast (SINGULAIR) 10 MG tablet TAKE 1 TABLET NIGHTLY 90 tablet 3    potassium chloride (KLOR-CON M) 10 MEQ extended release tablet TAKE 1 TABLET DAILY 90 tablet 3    warfarin (COUMADIN) 5 MG tablet TAKE 1 TABLET DAILY (Patient taking differently: Review INR prior to administration. Managed by Dr. Demi Carbajal) 100 tablet 3    Insulin Pen Needle (BD PEN NEEDLE JANNET U/F) 32G X 4 MM MISC USE 1 PEN NEEDLE FOUR TIMES A  each 3    magnesium oxide (MAG-OX) 400 MG tablet Take 1 tablet by mouth daily 90 tablet 3    ACETAMINOPHEN PO Take 500 mg by mouth every 6 hours as needed       albuterol (PROVENTIL) (2.5 MG/3ML) 0.083% nebulizer solution Take 3 mLs by nebulization every 6 hours as needed for Wheezing 120 each 3    polyethylene glycol (GLYCOLAX) 17 GM/SCOOP powder Take 17 g by mouth daily       omeprazole (PRILOSEC) 20 MG delayed release capsule Take 20 mg by mouth daily      ondansetron (ZOFRAN) 4 MG tablet Take 1 tablet by mouth 3 times daily as needed for Nausea or Vomiting 30 tablet 0    aspirin 81 MG chewable tablet Take 1 tablet by mouth daily 30 tablet 3    vitamin B-12 500 MCG tablet Take 1 tablet by mouth daily 30 tablet 3     No current facility-administered medications for this visit. Allergies   Allergen Reactions    Qqydqrmr-Dkxyckf-Dggilf [Fluocinolone] Shortness Of Breath    Ciprofloxacin Shortness Of Breath    Diovan [Valsartan] Shortness Of Breath    Flagyl [Metronidazole] Shortness Of Breath     Has taken diflucan at home 12/7/15    Metformin And Related [Metformin And Related] Shortness Of Breath    Benazepril      Other reaction(s): Not Recorded    Morphine      Bad reaction. \"makes her feel horrible\".      Saxagliptin      Other reaction(s): Not Recorded    Levaquin [Levofloxacin] Rash        Review of Systems:  Review of systems performed and negative with the exception of the above findings    OBJECTIVE:  /70   Wt 204 lb (92.5 kg)   BMI 31.02 kg/m²      Physical Exam:  General appearance: alert, appears stated age, cooperative and no distress  Head: Normocephalic, without obvious abnormality, atraumatic  Lungs: clear to auscultation bilaterally  Heart: regular rate and rhythm, S1, S2 normal, no murmur, click, rub or gallop  Abdomen: Soft, nondistended, nontender    Orders Only on 05/31/2022   Component Date Value Ref Range Status    Pro-BNP 05/31/2022 5,756* 0 - 449 pg/mL Final    Comment: Methodology by NT-proBNP    An age-independent cutoff point of 300 pg/ml has a 98%  negative predictive value excluding acute heart failure. Values exceeding the age-related cutoff values (450 pg/mL if  age<50, 900 if 50-75 and 1800 if >75) has 90% sensitivity and  84% specificity for diagnosing acute HF. In patients with  renal compromise (eGFR<60) values greater than 1200pg/ml have  a diagnostic sensitivity and specificity of 89% and 72% for  acute HF.  Sodium 05/31/2022 137  136 - 145 mmol/L Final    Potassium 05/31/2022 3.7  3.5 - 5.1 mmol/L Final    Chloride 05/31/2022 94* 99 - 110 mmol/L Final    CO2 05/31/2022 27  21 - 32 mmol/L Final    Anion Gap 05/31/2022 16  3 - 16 Final    Glucose 05/31/2022 204* 70 - 99 mg/dL Final    BUN 05/31/2022 51* 7 - 20 mg/dL Final    CREATININE 05/31/2022 1.9* 0.6 - 1.2 mg/dL Final    GFR Non- 05/31/2022 26* >60 Final    Comment: >60 mL/min/1.73m2 EGFR, calc. for ages 25 and older using the  MDRD formula (not corrected for weight), is valid for stable  renal function.  GFR  05/31/2022 31* >60 Final    Comment: Chronic Kidney Disease: less than 60 ml/min/1.73 sq.m. Kidney Failure: less than 15 ml/min/1.73 sq.m. Results valid for patients 18 years and older.  Calcium 05/31/2022 9.0  8.3 - 10.6 mg/dL Final    Hemoglobin A1C 05/31/2022 7.0  See comment % Final    Comment: Comment:  Diagnosis of Diabetes: > or = 6.5%  Increased risk of diabetes (Prediabetes): 5.7-6.4%  Glycemic Control: Nonpregnant Adults: <7.0%                    Pregnant: <6.0%        eAG 05/31/2022 154.2  mg/dL Final   No results displayed because visit has over 200 results.       Anti-coag visit on 05/13/2022   Component Date Value Ref Range Status    INR 05/13/2022 3.30   Final   Anti-coag visit on 05/02/2022   Component Date Value Ref Range Status    INR 05/02/2022 3.00   Final   Anti-coag visit on 05/02/2022   Component Date Value Ref Range Status    INR 04/30/2022 5.00   Final   Orders Only on 04/28/2022   Component Date Value Ref Range Status    UR Electrophoresis Int 04/28/2022 REVIEWED   Final    Comment: Normal urine protein electrophoresis and immunofixation  pattern. CPT Code: 99890, 65857  Electronically signed: Catrachita Smith MD     Orders Only on 04/28/2022   Component Date Value Ref Range Status    Creatinine, Ur 04/28/2022 31.8  28.0 - 259.0 mg/dL Final    Protein/Creat Ratio 04/28/2022 see below  mg/dL Final    Comment: Protein/Creatinine Ratio cannot be calculated since Urine Protein  and/or Urine Creatinine is below measurable range. No established reference range. Orders Only on 04/28/2022   Component Date Value Ref Range Status    Sodium, Ur 04/28/2022 99  Not Established mmol/L Final   Orders Only on 04/28/2022   Component Date Value Ref Range Status    Protein, Ur 04/28/2022 <4.00  <12 mg/dL Final    Protein, Ur 04/28/2022 <0.004  <0.012 g/dL Final   Orders Only on 04/28/2022   Component Date Value Ref Range Status    PTH 04/28/2022 177.2* 14.0 - 72.0 pg/mL Final   There may be more visits with results that are not included. CT ABDOMEN PELVIS WO CONTRAST Additional Contrast? None    Result Date: 5/25/2022  EXAMINATION: CT OF THE ABDOMEN AND PELVIS WITHOUT CONTRAST 5/20/2022 9:43 am TECHNIQUE: CT of the abdomen and pelvis was performed without the administration of intravenous contrast. Multiplanar reformatted images are provided for review. Automated exposure control, iterative reconstruction, and/or weight based adjustment of the mA/kV was utilized to reduce the radiation dose to as low as reasonably achievable.  COMPARISON: 05/13/2020, 05/07/2021 HISTORY: ORDERING SYSTEM PROVIDED HISTORY: Abdominal pain. TECHNOLOGIST PROVIDED HISTORY: Reason for exam:->Abdominal pain. Additional Contrast?->None Reason for Exam: Abdominal pain. FINDINGS: Lower Chest:  Visualized portion of the lower chest demonstrates no acute abnormality. Organs: The unenhanced liver, spleen, pancreas, adrenal glands and kidneys demonstrate no acute abnormality. Status post cholecystectomy. GI/Bowel: Mild sigmoid diverticulosis. No acutely dilated or inflamed loops of bowel. Appendix not localized. No inflammatory changes at the base of the cecum. Jejunal diverticulosis is again identified. There is extensive small bowel pneumatosis in the anterior abdomen and in the left upper quadrant similar to multiple prior exams dating back to at least 2020. Pelvis: In the left ovary, there is a low-attenuation 8.5 cm cystic lesion, with slow interval growth previously measuring 6.3 cm in 2020. No pelvic adenopathy. Peritoneum/Retroperitoneum: No free intraperitoneal air in the upper abdomen. Mesenteric gas and pneumatosis of the small bowel as previously described. Mild edema in the small bowel mesentery in the left upper quadrant has developed compared to the exams in 2021. Bones/Soft Tissues: No lytic or blastic osseous lesion. Small bowel pneumatosis and mesenteric gas in the left upper quadrant similar to multiple prior exams. However, there is a small amount of new mesenteric edema concerning for acute inflammation with underlying jejunal diverticulosis. No abscess. No free intraperitoneal air. Slowly enlarging 8.5 cm cystic lesion in the left ovary, previously 6.3 cm in 2020. Nonemergent gynecology follow-up should be considered.  Findings were discussed with Gerald To at 11:18 a.m. on 05/20/2022     XR CHEST PORTABLE    Result Date: 5/17/2022  EXAMINATION: ONE XRAY VIEW OF THE CHEST 5/17/2022 3:40 pm COMPARISON: Chest x-ray dated 12/20/2021 HISTORY: ORDERING SYSTEM PROVIDED HISTORY: SOB TECHNOLOGIST PROVIDED HISTORY: Reason for exam:->SOB Reason for Exam: SOB FINDINGS: Clear lungs. No pleural effusion or pneumothorax. Cardiomediastinal silhouette is enlarged. Status post sternotomy. Left-sided cardiac device with leads in the right atrium and right ventricle. Degenerative disease of the visualized osseous structures. Clear lungs. ASSESSMENT:  Acute jejunal diverticulitis  Chronic small bowel pneumatosis, mesenteric gas  Pneumonia  CHF  CAD, status-post CABG  Diabetes  Acute on chronic kidney disease  Atrial fibrillation, on Coumadin    PLAN:  1. Antibiotics complete, does not need additional coverage  2. Abdominal symptoms at baseline, no further acute work-up necessary  3. Diet and activity as tolerated  4. Continue medical management of CHF and constipation per primary and consulting teams  5. Follow general surgery as needed    Tushar Hathaway MD, FACS  6/6/2022  10:12 AM

## 2022-06-08 ENCOUNTER — TELEPHONE (OUTPATIENT)
Dept: CARDIOLOGY CLINIC | Age: 77
End: 2022-06-08

## 2022-06-08 NOTE — TELEPHONE ENCOUNTER
I spoke with pt and relayed instructions per NPRG. She verbalized understanding. I also advised her to give us an update in a day or two. She stated that  She will.

## 2022-06-08 NOTE — TELEPHONE ENCOUNTER
I spoke with pt.  She stated that she swelling and SOB worse than normal. PT are taking Torsemide 40 mg am 20 mg pm. Last 3 last has gained a pound a day since leaving the hospital.

## 2022-06-08 NOTE — TELEPHONE ENCOUNTER
Patient called in statin that she was seen on 6/1 and on that day she weighed 193.14. While at her appointment [id number] [facility] changed some of her medications. Since that day patient has been gaining anywhere from 1-2 lbs per day along with her feet welling. Today patients weight is now up to 206.8.     Please call patient at [phone]

## 2022-06-10 ENCOUNTER — ANTI-COAG VISIT (OUTPATIENT)
Dept: FAMILY MEDICINE CLINIC | Age: 77
End: 2022-06-10

## 2022-06-10 LAB — INR BLD: 2.6

## 2022-06-13 DIAGNOSIS — M17.0 PRIMARY OSTEOARTHRITIS OF BOTH KNEES: ICD-10-CM

## 2022-06-13 RX ORDER — OXYCODONE HYDROCHLORIDE 5 MG/1
5 TABLET ORAL 3 TIMES DAILY PRN
Qty: 90 TABLET | Refills: 0 | Status: SHIPPED | OUTPATIENT
Start: 2022-06-13 | End: 2022-08-16 | Stop reason: SDUPTHER

## 2022-06-13 NOTE — TELEPHONE ENCOUNTER
Medication:   Requested Prescriptions     Pending Prescriptions Disp Refills    oxyCODONE (ROXICODONE) 5 MG immediate release tablet 90 tablet 0     Sig: Take 1 tablet by mouth 3 times daily as needed for Pain for up to 30 days. Last Filled:  4/4/22    Patient Phone Number: [phone] (home)     Last appt: 5/17/2022   Next appt: 8/10/2022    Last OARRS:   RX Monitoring 3/1/2019   Attestation The Prescription Monitoring Report for this patient was reviewed today.    Periodic Controlled Substance Monitoring -     PDMP Monitoring:    Last PDMP Bruna Conley as Reviewed [facility]):  Review User Review Instant Review Result   Shamika Deonte 8/17/2021  8:33 AM Reviewed PDMP [1]     Preferred Pharmacy:       [facility] [address] [id number] Alec Tipton, [address] [phone] Sofi Collins [phone]  [address] [place] [address] Road  [address] [place] [address]  Phone: [phone] Fax: [phone]

## 2022-06-13 NOTE — TELEPHONE ENCOUNTER
oxyCODONE (ROXICODONE) 5 MG immediate release tablet 90 tablet 0 4/4/2022 5/4/2022    Sig - Route:  Take 1 tablet by mouth 3 times daily as needed for Pain for up to 30 days. - Oral    Sent to pharmacy as: oxyCODONE HCl 5 MG Oral Tablet [facility])          Pharmacy    [facility] [id number] [address], [place] [address]   Phone:  [phone]  Fax:  [phone]

## 2022-06-16 ENCOUNTER — TELEPHONE (OUTPATIENT)
Dept: CARDIOLOGY CLINIC | Age: 77
End: 2022-06-16

## 2022-06-16 NOTE — TELEPHONE ENCOUNTER
Called pt and relayed message per [facility] with verbal understanding and encouraged to call office with any other questions or concerns.

## 2022-06-16 NOTE — TELEPHONE ENCOUNTER
Increase the torsemide to 40 mg twice a day   Make sure she takes a dose of metolazone once this week  thanks

## 2022-06-16 NOTE — TELEPHONE ENCOUNTER
I spoke with pt and relayed the below message per [facility] and asked the questions below. Pt verbalized understanding. Norma Barron, APRN - CNS  [facility]  Fluid level still elevated   Call and see how her breathing and edema are doing   May need to increase diuretics   Thanks       CHF Screening Questionaire             2. Are you SOB? YES      3. How long has this SOB been going on? a week    4. Do you weigh yourself daily? (patients should weigh themselves daily, at the same time of day, wearing the same amount of clothing, first thing in the morning after urinating - please encourage them to continue with daily weights with or without symptoms)   yes    5. Has your weight gone up in the past 2 weeks? YES If yes, how much? 1-2lbs/day     5. What is your weight in the past 5 days? on Lucy 10, it was 205lbs and today its 214lbs     6. Do you have any swelling in your feet or legs? YES     7. Is your abdomen bloated? {YES

## 2022-06-17 ENCOUNTER — TELEPHONE (OUTPATIENT)
Dept: FAMILY MEDICINE CLINIC | Age: 77
End: 2022-06-17

## 2022-06-17 NOTE — TELEPHONE ENCOUNTER
Hold Coumadin for 2 days and recheck INR. If her INR is between 2-3 she could restart her normal dose and then recheck INR the following Monday.

## 2022-06-20 LAB — INR BLD: 2.3

## 2022-06-21 ENCOUNTER — NURSE ONLY (OUTPATIENT)
Dept: CARDIOLOGY CLINIC | Age: 77
End: 2022-06-21
Payer: MEDICARE

## 2022-06-21 ENCOUNTER — TELEPHONE (OUTPATIENT)
Dept: CARDIOLOGY CLINIC | Age: 77
End: 2022-06-21

## 2022-06-21 DIAGNOSIS — I50.22 CHRONIC SYSTOLIC CHF (CONGESTIVE HEART FAILURE), NYHA CLASS 3 (HCC): Primary | ICD-10-CM

## 2022-06-21 DIAGNOSIS — Z95.810 ICD (IMPLANTABLE CARDIOVERTER-DEFIBRILLATOR) IN PLACE: ICD-10-CM

## 2022-06-21 DIAGNOSIS — I50.22 CHRONIC SYSTOLIC CHF (CONGESTIVE HEART FAILURE), NYHA CLASS 3 (HCC): ICD-10-CM

## 2022-06-21 DIAGNOSIS — I25.5 ISCHEMIC CARDIOMYOPATHY: ICD-10-CM

## 2022-06-21 PROCEDURE — 93297 REM INTERROG DEV EVAL ICPMS: CPT | Performed by: CLINICAL NURSE SPECIALIST

## 2022-06-21 PROCEDURE — G2066 INTER DEVC REMOTE 30D: HCPCS | Performed by: CLINICAL NURSE SPECIALIST

## 2022-06-21 NOTE — TELEPHONE ENCOUNTER
----- Message from SOLEDAD Dsouza sent at 6/21/2022  9:22 AM EDT -----  Britney Grace is elevated  Please call and see what her weights are  Has she taken any metolazone lately  thanks

## 2022-06-21 NOTE — PROGRESS NOTES
Remote transmission received from patient's dual chamber ICD home monitor. Transmission shows normal sensing and pacing function. No new arrhythmias/ events recorded. Possible Optivol fluid accumulation 3/1/22 --- ongoing. Currently elevated around 140, above threshold, with correlating TI trend below reference line approaching back to reference line. TriageHF Heart Failure Risk Status on 21-Jun-2022 is High*     NP will review. See interrogation under cardiology tab in the [address] [place] [address] [address] field for more details. Will continue to monitor remotely.     (End of 31-day monitoring period 6/21/22, HF diagnostics)

## 2022-06-21 NOTE — TELEPHONE ENCOUNTER
Called and spoke with patient and informed her of message below. She states that she is taking 2 tablets already.  NPRG  Would like for her to take 1 extra tablet of metolazone and recheck labs in 10 days

## 2022-06-21 NOTE — TELEPHONE ENCOUNTER
Called and spoke with patient today. She states that her weight was 224 lbs prior to taking metolazone 3 days ago. Today her weight is 203 lb she does c/o bilateral foot swelling.

## 2022-06-23 ENCOUNTER — ANTI-COAG VISIT (OUTPATIENT)
Dept: FAMILY MEDICINE CLINIC | Age: 77
End: 2022-06-23

## 2022-06-23 ENCOUNTER — TELEPHONE (OUTPATIENT)
Dept: FAMILY MEDICINE CLINIC | Age: 77
End: 2022-06-23

## 2022-06-24 ENCOUNTER — CARE COORDINATION (OUTPATIENT)
Dept: CASE MANAGEMENT | Age: 77
End: 2022-06-24

## 2022-06-24 RX ORDER — POTASSIUM CHLORIDE 750 MG/1
TABLET, EXTENDED RELEASE ORAL
Qty: 90 TABLET | Refills: 0 | Status: SHIPPED | OUTPATIENT
Start: 2022-06-24 | End: 2022-08-31 | Stop reason: SDUPTHER

## 2022-06-24 RX ORDER — MONTELUKAST SODIUM 10 MG/1
TABLET ORAL
Qty: 90 TABLET | Refills: 0 | Status: SHIPPED | OUTPATIENT
Start: 2022-06-24 | End: 2022-08-31 | Stop reason: SDUPTHER

## 2022-06-24 RX ORDER — METOPROLOL SUCCINATE 50 MG/1
TABLET, EXTENDED RELEASE ORAL
Qty: 90 TABLET | Refills: 0 | Status: SHIPPED | OUTPATIENT
Start: 2022-06-24 | End: 2022-08-31 | Stop reason: SDUPTHER

## 2022-06-24 NOTE — CARE COORDINATION
[facility] Transitions Follow Up Call    2022    Patient: Erma Porter  Patient : 1945   MRN: [id number]  Reason for Admission: PNA  Discharge Date: 22 RARS: Readmission Risk Score: 20.2 ( )         Spoke with: [address] Transitions Subsequent and Final Call    Subsequent and Final Calls  Do you have any ongoing symptoms?: No  Have your medications changed?: No  Do you have any questions related to your medications?: No  Do you currently have any active services?: Yes  Are you currently active with any services?: Home Health  Do you have any needs or concerns that I can assist you with?: No  Identified Barriers: None  Care Transitions Interventions  Other Interventions: Follow Up Final outreach call: Patient reports that she is doing well, denies any questions or concerns. She is breathing well and is afebrile. She followed up with cardiology and Dr. Jb Lu earlier this month. CTN will resolve episode and remain available.   Future Appointments   Date Time Provider Tawana Young   2022  8:30 AM SOLEDAD Rosenberg - CNS FF Cardio [facility]   2022 11:30 AM Cu[place]emoc Chol CHECK FF [facility]   8/10/2022  8:00 AM Jackelyn Swartz MD    2022 11:00 AM [place]emoc Chol CHECK FF [facility]   2022 11:15 AM SCHEDULE, SHERYL OPTIVOL CHECK FF [facility]   2022 11:00 AM SCHEDULE, Joe Martins FF [facility]       Juliane Pichardo RN

## 2022-06-24 NOTE — TELEPHONE ENCOUNTER
Medication:   Requested Prescriptions     Pending Prescriptions Disp Refills    potassium chloride (KLOR-CON M) 10 MEQ extended release tablet [Pharmacy Med Name: POTASSIUM CHLORIDE ER (DISP) TABS 10MEQ] 90 tablet 3     Sig: TAKE 1 TABLET DAILY    metoprolol succinate (TOPROL XL) 50 MG extended release tablet [Pharmacy Med Name: METOPROLOL SUCCINATE ER TABS 50MG] 90 tablet 3     Sig: TAKE 1 TABLET DAILY    montelukast (SINGULAIR) 10 MG tablet [Pharmacy Med Name: MONTELUKAST SODIUM TABS 10MG] 90 tablet 3     Sig: TAKE 1 TABLET NIGHTLY          Patient Phone Number: [phone] (home)     Last appt: 5/17/2022   Next appt: 8/10/2022    Last OARRS:   RX Monitoring 3/1/2019   Attestation The Prescription Monitoring Report for this patient was reviewed today.    Periodic Controlled Substance Monitoring -     PDMP Monitoring:    Last PDMP Kaila Coradokathy as Reviewed [facility]):  Review User Review Instant Review Result   [facility] 8/17/2021  8:33 AM Reviewed PDMP [1]     Preferred Pharmacy:   [facility] , [address] [phone] - F [phone]  [place] [address]  Phone: [phone] Fax: [phone]    [facility] [id number] [address] [place] [address]  Phone: [phone] Fax: [phone]

## 2022-07-15 ENCOUNTER — ANTI-COAG VISIT (OUTPATIENT)
Dept: FAMILY MEDICINE CLINIC | Age: 77
End: 2022-07-15

## 2022-07-15 ENCOUNTER — TELEPHONE (OUTPATIENT)
Dept: FAMILY MEDICINE CLINIC | Age: 77
End: 2022-07-15

## 2022-07-15 LAB — INR BLD: 1.5

## 2022-07-28 ENCOUNTER — ANTI-COAG VISIT (OUTPATIENT)
Dept: FAMILY MEDICINE CLINIC | Age: 77
End: 2022-07-28

## 2022-07-28 LAB — INR BLD: 1.4

## 2022-08-02 ENCOUNTER — OFFICE VISIT (OUTPATIENT)
Dept: CARDIOLOGY CLINIC | Age: 77
End: 2022-08-02
Payer: MEDICARE

## 2022-08-02 ENCOUNTER — NURSE ONLY (OUTPATIENT)
Dept: CARDIOLOGY CLINIC | Age: 77
End: 2022-08-02

## 2022-08-02 VITALS
OXYGEN SATURATION: 97 % | BODY MASS INDEX: 31.83 KG/M2 | HEIGHT: 68 IN | SYSTOLIC BLOOD PRESSURE: 108 MMHG | DIASTOLIC BLOOD PRESSURE: 60 MMHG | WEIGHT: 210 LBS | HEART RATE: 68 BPM

## 2022-08-02 DIAGNOSIS — I38 CHRONIC SYSTOLIC HEART FAILURE DUE TO VALVULAR DISEASE (HCC): Primary | ICD-10-CM

## 2022-08-02 DIAGNOSIS — I50.22 CHRONIC SYSTOLIC CHF (CONGESTIVE HEART FAILURE), NYHA CLASS 3 (HCC): ICD-10-CM

## 2022-08-02 DIAGNOSIS — Z95.810 ICD (IMPLANTABLE CARDIOVERTER-DEFIBRILLATOR) IN PLACE: Primary | ICD-10-CM

## 2022-08-02 DIAGNOSIS — I95.9 HYPOTENSION, UNSPECIFIED HYPOTENSION TYPE: ICD-10-CM

## 2022-08-02 DIAGNOSIS — Z95.1 S/P CABG X 3: ICD-10-CM

## 2022-08-02 DIAGNOSIS — I25.5 ISCHEMIC CARDIOMYOPATHY: ICD-10-CM

## 2022-08-02 DIAGNOSIS — I48.0 PAF (PAROXYSMAL ATRIAL FIBRILLATION) (HCC): ICD-10-CM

## 2022-08-02 DIAGNOSIS — N18.30 STAGE 3 CHRONIC KIDNEY DISEASE, UNSPECIFIED WHETHER STAGE 3A OR 3B CKD (HCC): ICD-10-CM

## 2022-08-02 DIAGNOSIS — I35.0 NONRHEUMATIC AORTIC VALVE STENOSIS: ICD-10-CM

## 2022-08-02 DIAGNOSIS — Z95.810 ICD (IMPLANTABLE CARDIOVERTER-DEFIBRILLATOR) IN PLACE: ICD-10-CM

## 2022-08-02 DIAGNOSIS — I50.22 CHRONIC SYSTOLIC HEART FAILURE DUE TO VALVULAR DISEASE (HCC): Primary | ICD-10-CM

## 2022-08-02 PROCEDURE — 1123F ACP DISCUSS/DSCN MKR DOCD: CPT | Performed by: CLINICAL NURSE SPECIALIST

## 2022-08-02 PROCEDURE — 93290 INTERROG DEV EVAL ICPMS IP: CPT | Performed by: CLINICAL NURSE SPECIALIST

## 2022-08-02 PROCEDURE — 99214 OFFICE O/P EST MOD 30 MIN: CPT | Performed by: CLINICAL NURSE SPECIALIST

## 2022-08-02 RX ORDER — TORSEMIDE 20 MG/1
TABLET ORAL
Qty: 360 TABLET | Refills: 0 | Status: SHIPPED
Start: 2022-08-02 | End: 2022-09-14

## 2022-08-02 NOTE — PROGRESS NOTES
Jyotsna Skaggs 97 transmission received 8/2/22 from [facility] for patient's dual chamber ICD. TECHNICAL FINDINGS  No device or lead performance issue(s) observed    ADDITIONAL NOTES    DEVICE ASSESSMENT: Available daily battery/lead measurements within expected range. Lead trends stable. ARRHYTHMIA SUMMARY: No arrhythmias/high rate episodes detected since last interrogation on 01-Jun-2022     OBSERVATIONS: Possible OptiVol fluid accumulation: 16-Jul-2022 -- ongoing. Device appears to be currently functioning as programmed.

## 2022-08-02 NOTE — PROGRESS NOTES
section; Colonoscopy (02/07/2017); skin biopsy; bronchoscopy (07/18/2016); Coronary artery bypass graft (08/21/2018); Mitral valve replacement (08/21/2018); transesophageal echocardiogram (08/21/2018); Tunneled venous catheter placement (Left, 08/23/2018); Cardiac catheterization (08/16/2018); Insertable Cardiac Monitor (Left, 08/16/2018); Colonoscopy (01/17/2014); Upper gastrointestinal endoscopy (N/A, 10/10/2018); Colonoscopy (10/10/2018); Colonoscopy (N/A, 8/7/2020); Pain management procedure (N/A, 12/18/2020); Pain management procedure (Bilateral, 1/18/2021); and Cardiac catheterization (5/2 and 5/3 2021). Social History:   reports that she has never smoked. She has never used smokeless tobacco. She reports that she does not drink alcohol and does not use drugs. Family History:   Family History   Problem Relation Age of Onset    Cancer Father     Asthma Mother     Hypertension Mother     Heart Disease Mother     High Blood Pressure Mother        Home Medications:  Prior to Admission medications    Medication Sig Start Date End Date Taking?  Authorizing Provider   torsemide (DEMADEX) 20 MG tablet 40 mg in am and 40 mg in pm 8/2/22  Yes SOLEDAD Burns - MARTIN   potassium chloride (KLOR-CON M) 10 MEQ extended release tablet TAKE 1 TABLET DAILY 6/24/22  Yes Charan Benedict MD   metoprolol succinate (TOPROL XL) 50 MG extended release tablet TAKE 1 TABLET DAILY 6/24/22  Yes Charan Benedict MD   montelukast (SINGULAIR) 10 MG tablet TAKE 1 TABLET NIGHTLY 6/24/22  Yes Charan Benedict MD   calcitRIOL (ROCALTROL) 0.25 MCG capsule Take 0.25 mcg by mouth daily  5/4/22  Yes Historical Provider, MD   insulin lispro, 1 Unit Dial, (HUMALOG KWIKPEN) 100 UNIT/ML SOPN 5 units plusTID Insulin dosage per Sliding scale-140-199-2 units, 200-249- 3 units, 250-300-4 units, >300 take 5 units, max dose per day of 50 units 5/6/22  Yes Charan Benedict MD   metOLazone (ZAROXOLYN) 2.5 MG tablet TAKE 1 TABLET ON TUESDAY AND FRIDAY AND AS DIRECTED 5/3/22  Yes SOLEDAD Patterson   spironolactone (ALDACTONE) 25 MG tablet TAKE 1 TABLET TWICE A DAY 4/21/22  Yes SOLEDAD Patterson   vitamin D (ERGOCALCIFEROL) 1.25 MG (61191 UT) CAPS capsule TAKE ONE CAPSULE BY MOUTH ONCE WEEKLY 3/24/22  Yes SOLEDAD Blount - CNP   amiodarone (CORDARONE) 200 MG tablet TAKE 1 TABLET DAILY  Patient taking differently: Take 100 mg by mouth in the morning. 3/10/22  Yes Gabrielle Roberts MD   nystatin (MYCOSTATIN) 556245 UNIT/ML suspension TAKE ONE TEASPOONFUL BY MOUTH FOUR TIMES A DAY FOR 5 DAYS 2/22/22  Yes Bren Pfeiffer MD   levothyroxine (SYNTHROID) 100 MCG tablet Take 1 tablet by mouth daily 2/22/22  Yes Bren Pfeiffer MD   blood glucose test strips (FREESTYLE LITE) strip USE FOUR TIMES A DAY 2/15/22  Yes Bren Pfeiffer MD   insulin glargine (LANTUS SOLOSTAR) 100 UNIT/ML injection pen INJECT 20 UNITS IN THE MORNING  Patient taking differently: 22 Units INJECT 20 UNITS IN THE MORNING 11/22/21  Yes Bren Pfeiffer MD   albuterol sulfate  (90 Base) MCG/ACT inhaler USE 2 INHALATIONS EVERY 6 HOURS AS NEEDED FOR WHEEZING 11/19/21  Yes Bren Pfeiffer MD   ipratropium-albuterol (DUONEB) 0.5-2.5 (3) MG/3ML SOLN nebulizer solution Inhale 3 mLs into the lungs every 4 hours as needed for Shortness of Breath 11/15/21  Yes Bren Pfeiffer MD   midodrine (PROAMATINE) 2.5 MG tablet TAKE ONE TABLET BY MOUTH TWICE A DAY  Patient taking differently: as needed 9/16/21  Yes SOLEDAD Patterson   FreeStyle Lancets MISC 1 each by Does not apply route 4 times daily 9/7/21  Yes Bren Pfeiffer MD   Blood Glucose Monitoring Suppl (FREESTYLE LITE) GAETANO 1 Device by Does not apply route 4 times daily   Yes Historical Provider, MD   oxyCODONE (ROXICODONE) 5 MG immediate release tablet Take 0.5 mg by mouth daily.    Yes Historical Provider, MD   warfarin (COUMADIN) 5 MG tablet TAKE 1 TABLET DAILY  Patient taking differently: Review INR prior to administration. Managed by Dr. Johnathan Silverman 8/3/21  Yes Balbina Blanton MD   Insulin Pen Needle (BD PEN NEEDLE JANNET U/F) 32G X 4 MM MISC USE 1 PEN NEEDLE FOUR TIMES A DAY 6/14/21  Yes Balbina Blanton MD   magnesium oxide (MAG-OX) 400 MG tablet Take 1 tablet by mouth daily 5/10/21  Yes Lisa Pulido MD   ACETAMINOPHEN PO Take 500 mg by mouth every 6 hours as needed    Yes Historical Provider, MD   albuterol (PROVENTIL) (2.5 MG/3ML) 0.083% nebulizer solution Take 3 mLs by nebulization every 6 hours as needed for Wheezing 6/2/20  Yes Balbina Blanton MD   polyethylene glycol (GLYCOLAX) 17 GM/SCOOP powder Take 17 g by mouth daily    Yes Historical Provider, MD   omeprazole (PRILOSEC) 20 MG delayed release capsule Take 20 mg by mouth daily   Yes Historical Provider, MD   ondansetron (ZOFRAN) 4 MG tablet Take 1 tablet by mouth 3 times daily as needed for Nausea or Vomiting 3/26/20  Yes Balbina Blanton MD   aspirin 81 MG chewable tablet Take 1 tablet by mouth daily 6/7/19  Yes Balbina Blanton MD   vitamin B-12 500 MCG tablet Take 1 tablet by mouth daily 10/12/18  Yes Blanca Dumont MD        Allergies:  Odzwsufc-biapttc-luvztl [fluocinolone], Ciprofloxacin, Diovan [valsartan], Flagyl [metronidazole], Metformin and related [metformin and related], Benazepril, Morphine, Saxagliptin, and Levaquin [levofloxacin]     Review of Systems:   Constitutional: there has been no unanticipated weight loss. There's been no change in energy level, sleep pattern, or activity level. Eyes: No visual changes or diplopia. No scleral icterus. ENT: No Headaches, hearing loss or vertigo. No mouth sores or sore throat. Cardiovascular: Reviewed in HPI  Respiratory: No cough or wheezing, no sputum production. No hematemesis. Gastrointestinal: No abdominal pain, appetite loss, blood in stools. No change in bowel or bladder habits.   Genitourinary: No dysuria, trouble voiding, or hematuria. Musculoskeletal:  No gait disturbance, weakness or joint complaints. Integumentary: No rash or pruritis. Neurological: No headache, diplopia, change in muscle strength, numbness or tingling. No change in gait, balance, coordination, mood, affect, memory, mentation, behavior. Psychiatric: No anxiety, no depression. Endocrine: No malaise, fatigue or temperature intolerance. No excessive thirst, fluid intake, or urination. No tremor. Hematologic/Lymphatic: No abnormal bruising or bleeding, blood clots or swollen lymph nodes. Allergic/Immunologic: No nasal congestion or hives. Physical Examination:    Vitals:    08/02/22 0818   BP: 108/60   Site: Left Upper Arm   Position: Sitting   Cuff Size: Large Adult   Pulse: 68   SpO2: 97%   Weight: 210 lb (95.3 kg)   Height: 5' 8\" (1.727 m)        Constitutional and General Appearance: Warm and dry, no apparent distress, normal coloration  HEENT:  Normocephalic, atraumatic  Respiratory:  Normal excursion and expansion without use of accessory muscles  Resp Auscultation: Normal breath sounds without dullness  Cardiovascular: The apical impulses not displaced  Heart tones are crisp and normal  JVP normal cm H2O  regular rate and rhythm  Peripheral pulses are symmetrical and full  There is no clubbing, cyanosis of the extremities.   No ankle edema  Pedal Pulses: 2+ and equal   Abdomen:   No masses or tenderness  Liver/Spleen: No Abnormalities Noted  Neurological/Psychiatric:  Alert and oriented in all spheres  Moves all extremities well  Exhibits normal gait balance and coordination  No abnormalities of mood, affect, memory, mentation, or behavior are noted    Lab Data:    CBC:   Lab Results   Component Value Date/Time    WBC 6.9 08/01/2022 08:58 AM    WBC 6.7 05/24/2022 05:47 AM    WBC 5.7 05/23/2022 06:14 AM    RBC 3.91 08/01/2022 08:58 AM    RBC 3.65 05/24/2022 05:47 AM    RBC 3.54 05/23/2022 06:14 AM    HGB 11.2 08/01/2022 08:58 AM    HGB 10.4 05/24/2022 05:47 AM    HGB 10.3 05/23/2022 06:14 AM    HCT 34.5 08/01/2022 08:58 AM    HCT 32.6 05/24/2022 05:47 AM    HCT 31.6 05/23/2022 06:14 AM    MCV 88.1 08/01/2022 08:58 AM    MCV 89.4 05/24/2022 05:47 AM    MCV 89.2 05/23/2022 06:14 AM    RDW 16.5 08/01/2022 08:58 AM    RDW 18.0 05/24/2022 05:47 AM    RDW 17.8 05/23/2022 06:14 AM     08/01/2022 08:58 AM     05/24/2022 05:47 AM     05/23/2022 06:14 AM     BMP:  Lab Results   Component Value Date/Time     08/01/2022 08:58 AM     06/15/2022 10:26 AM     05/31/2022 09:23 AM    K 4.1 08/01/2022 08:58 AM    K 3.8 06/15/2022 10:26 AM    K 3.7 05/31/2022 09:23 AM    K 4.0 05/18/2022 04:23 AM    K 3.5 04/21/2021 04:41 AM    K 3.7 04/20/2021 04:28 AM    CL 99 08/01/2022 08:58 AM    CL 97 06/15/2022 10:26 AM    CL 94 05/31/2022 09:23 AM    CO2 28 08/01/2022 08:58 AM    CO2 26 06/15/2022 10:26 AM    CO2 27 05/31/2022 09:23 AM    PHOS 3.6 08/01/2022 08:58 AM    PHOS 2.6 05/24/2022 05:47 AM    PHOS 2.5 05/23/2022 06:14 AM    BUN 32 08/01/2022 08:58 AM    BUN 33 06/15/2022 10:26 AM    BUN 51 05/31/2022 09:23 AM    CREATININE 1.6 08/01/2022 08:58 AM    CREATININE 1.6 06/15/2022 10:26 AM    CREATININE 1.9 05/31/2022 09:23 AM     BNP:   Lab Results   Component Value Date/Time    PROBNP 6,573 06/15/2022 10:26 AM    PROBNP 5,756 05/31/2022 09:23 AM    PROBNP 3,211 05/17/2022 04:01 PM     Cardiac Imaging:  Echo 5/6/2022  Summary   Technically difficult examination due to visualization and irregular rhythm   Normal left ventricle size. There is moderate concentric left ventricular hypertrophy. Ejection fraction is visually estimated to be 40%. Overall left ventricular systolic function appears moderately reduced. There is akinesis of the apex walls. 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   No evidence of apical thrombus by contrast administration. A bioprosthetic mitral valve is well seated. Visually opens well.  Cannot   adequately assess for dysfunction as gradients not obtained. The left atrium is moderately dilated. No pericardial effusion. JUDE Preliminary 5/4/2021 Dr Diamond Rodriguez  AV - area ~ 1.5, opens adequately, not severe aortic stenosis     [facility] 5/3/2021 Dr Diamond Rodriguez  Artery Findings/Result   LM Normal   LAD 50% mid, 70% mid, competitive flow distal from LIMA   Cx OM1 20% ostial to prox, superior branch mid OM1 50% ostial   RI N/A   S-D-RPL patent   L-LAD patent   RCA 50-60% distal, very heavily calcified   LVEDP 15   AV Peak to peak gradient 36mmHg, valve crossed easily with pigtail. LVG NA      Intervention:         None     Post Cath Dx:       Patent grafts     Echo 4/20/21  Summary   Overall left ventricular systolic function is severely depressed . Ejection fraction is visually estimated to be 10-15 %. E/e'= 23.2 GLS=-3.4   Moderately dilated left ventricle. There is severe diffuse hypokinesis. Indeterminate diastolic function. A bioprosthetic mitral valve is well seated with peak velocity of 1.59m/s   and a mean gradient of 3mmHg. The left atrium is moderately dilated. Mild aortic stenosis with a peak velocity of 2.5m/s and a mean pressure   gradient of 14mmHg. The aortic valve is thickened/calcified with decreased leaflet mobility   consistent with aortic stenosis. Mild to moderate tricuspid regurgitation. Estimated pulmonary artery systolic pressure is mildly to moderately   elevated at 46 mmHg assuming a right atrial pressure of 8 mmHg    11/30/2020 Dobutamine Echo   Dobutamine echocardiogram for aortic stenosis. Very technically limited exam due to atrial fibrillation. Baseline echocardiogram shows severe left ventricular dysfunction with   anterior, lateral and apical hypokinesis with an ejection fraction of 20-25%. There is no improvement in wall motion with low or high dose dobutamine   suggestive of nonviable myocardium.       Mild prosthetic aortic valve stenosis with a mean gradient of 16mmHg and   peak velocity of 2.47m/s at rest. After dobutamine stress the mean AV   gradient rises to 20mmHg with 20mcg of dobutamine consistent with mild to   moderate aortic stenosis. Prosthetic mitral valve with a mean gradient of 7mmHg        JUDE 10/21/2020  Mildly dilated left ventricular size and normal wall thickness. Global left ventricular function is severely decreased with ejection fraction estimated at 25%. Patient is in atrial fibrillation during procedure. The bioprosthetic mitral valve is well seated with a mean gradient of 5mmHg and maximum pressure gradient of 15 mmHg with heart rate of 89 bpm. Pressure half time of 82 ms. There is trivial mitral regurgitation. Left atrial enlargement. Spontaneous echo contrast seen in the left atrium. A large stump of the left atrial appendage with no thrombus noted. Patient has history of surgical ligation of the left atrial appendage. There are spontaneous echo contrast in the atrial appendage. The aortic valve is thickened/calcified with decreased leaflet mobility consistent with aortic stenosis. There is trivial aortic insufficiency. There is moderate tricuspid regurgitation. ECHO 9/15/20  Left ventricular cavity size is dilated. There is normal wall thickness with a moderately severe reduction in   systolic function. LV ejection fraction is visually estimated to be 25-30%. Indeterminate diastolic function. The right ventricle is not well visualized but appears to be normal in size with moderately reduced function. The left atrium is moderately dilated. A bioprosthetic mitral valve with a mean gradient of 7 mmHg. This may be a normal gradient for this valve but could suggest mild stenosis. Trivial mitral regurgitation. The aortic valve is thickened/calcified with a mean gradient of 16mmHg consistent with at least mild aortic stenosis. This is likely underestimated due to low cardiac output secondary to LV dysfunction.    No significant aortic valve regurgitation. Moderate tricuspid regurgitation with RVSP of 48 mmHg. JUDE 11/1/2019  Normal left ventricular cavity size and wall thickness. Global left ventricular function is moderate-to-severely decreased with ejection fraction estimated from 35% to 40%. Severe apical akinesis noted. The bioprosthetic mitral valve is well seated with a mean gradient of 2mmHg and maximum pressure gradient of 5 mmHg. There is trivial mitral regurgitation. Left atrial enlargement. Spontaneous echo contrast seen in the left atrium. Stump of the left atrial appendage noted with no thrombus. The aortic valve is thickened/calcified with decreased leaflet mobility consistent with aortic stenosis. There is trivial aortic insufficiency    Assessment:    1. Chronic systolic heart failure due to valvular disease ([facility]) BB and aldosterone antagonist; no ace/arb due ckd   2. PAF (paroxysmal atrial fibrillation) (HCC) on coumadin in nsr   3. S/P CABG x 3    4. Nonrheumatic aortic valve stenosis    5. Hypotension on midodrine   6. Chronic kidney disease   7.       ICD in place    Plan:   Patient Instructions   Resume metolazone 2.5 mg on tues and Friday   Continue all current medications  Goal is to loose 10 pounds and if down cut metolazone back to once a week  RTO in 3-4 months  Blood month in 1 month    I appreciate the opportunity of cooperating in the care of this individual.    Burt Leventhal, APRN - CNS, CNS, 8/2/2022, 5:09 PM

## 2022-08-02 NOTE — PATIENT INSTRUCTIONS
Resume metolazone 2.5 mg on tues and Friday   Continue all current medications  Goal is to loose 10 pounds and if down cut metolazone back to once a week  RTO in 3-4 months  Blood month in 1 month

## 2022-08-03 ENCOUNTER — TELEPHONE (OUTPATIENT)
Dept: FAMILY MEDICINE CLINIC | Age: 77
End: 2022-08-03

## 2022-08-03 RX ORDER — FLUCONAZOLE 100 MG/1
100 TABLET ORAL DAILY
Qty: 7 TABLET | Refills: 0 | Status: SHIPPED | OUTPATIENT
Start: 2022-08-03 | End: 2022-08-10

## 2022-08-03 NOTE — TELEPHONE ENCOUNTER
Patient's  calling on behalf of wife in regards to thrush she is experiencing. States the Karen is not working and that there was a different medication that Dr. Addie Estes prescribed a while back that did help but they do not remember what it is called. Call back number [phone]. Please advise.

## 2022-08-11 LAB — INR BLD: 5.2

## 2022-08-12 ENCOUNTER — ANTI-COAG VISIT (OUTPATIENT)
Dept: FAMILY MEDICINE CLINIC | Age: 77
End: 2022-08-12

## 2022-08-12 DIAGNOSIS — M17.0 PRIMARY OSTEOARTHRITIS OF BOTH KNEES: ICD-10-CM

## 2022-08-12 RX ORDER — LEVOTHYROXINE SODIUM 0.1 MG/1
TABLET ORAL
Qty: 90 TABLET | Refills: 0 | Status: SHIPPED | OUTPATIENT
Start: 2022-08-12

## 2022-08-12 NOTE — TELEPHONE ENCOUNTER
oxyCODONE (ROXICODONE) 5 MG immediate release tablet 90 tablet 0 6/13/2022 7/13/2022    Sig - Route:  Take 1 tablet by mouth 3 times daily as needed for Pain for up to 30 days. - Oral

## 2022-08-15 DIAGNOSIS — E11.22 TYPE 2 DIABETES MELLITUS WITH STAGE 3B CHRONIC KIDNEY DISEASE, WITH LONG-TERM CURRENT USE OF INSULIN (HCC): ICD-10-CM

## 2022-08-15 DIAGNOSIS — N18.32 TYPE 2 DIABETES MELLITUS WITH STAGE 3B CHRONIC KIDNEY DISEASE, WITH LONG-TERM CURRENT USE OF INSULIN (HCC): ICD-10-CM

## 2022-08-15 DIAGNOSIS — Z79.4 TYPE 2 DIABETES MELLITUS WITH STAGE 3B CHRONIC KIDNEY DISEASE, WITH LONG-TERM CURRENT USE OF INSULIN (HCC): ICD-10-CM

## 2022-08-15 RX ORDER — INSULIN LISPRO 100 [IU]/ML
INJECTION, SOLUTION INTRAVENOUS; SUBCUTANEOUS
Qty: 15 ML | Refills: 2 | Status: SHIPPED | OUTPATIENT
Start: 2022-08-15

## 2022-08-16 RX ORDER — OXYCODONE HYDROCHLORIDE 5 MG/1
5 TABLET ORAL 3 TIMES DAILY PRN
Qty: 90 TABLET | Refills: 0 | Status: SHIPPED | OUTPATIENT
Start: 2022-08-16 | End: 2022-08-31

## 2022-08-23 ENCOUNTER — NURSE ONLY (OUTPATIENT)
Dept: CARDIOLOGY CLINIC | Age: 77
End: 2022-08-23
Payer: MEDICARE

## 2022-08-23 DIAGNOSIS — Z95.810 ICD (IMPLANTABLE CARDIOVERTER-DEFIBRILLATOR) IN PLACE: ICD-10-CM

## 2022-08-23 DIAGNOSIS — I50.22 CHRONIC SYSTOLIC CHF (CONGESTIVE HEART FAILURE), NYHA CLASS 3 (HCC): Primary | ICD-10-CM

## 2022-08-23 DIAGNOSIS — I25.5 ISCHEMIC CARDIOMYOPATHY: ICD-10-CM

## 2022-08-23 PROCEDURE — 93295 DEV INTERROG REMOTE 1/2/MLT: CPT | Performed by: INTERNAL MEDICINE

## 2022-08-23 PROCEDURE — 93296 REM INTERROG EVL PM/IDS: CPT | Performed by: INTERNAL MEDICINE

## 2022-08-23 PROCEDURE — 93297 REM INTERROG DEV EVAL ICPMS: CPT | Performed by: CLINICAL NURSE SPECIALIST

## 2022-08-23 NOTE — PROGRESS NOTES
Remote transmission received from patient's dual chamber ICD home monitor. Transmission shows normal sensing and pacing function. No new arrhythmias/ events recorded. Optivol is within normal range w slight elevation noted up to 40 (below 60 threshold) w TI trend slightly below reference line. TriageHF Heart Failure Risk Status on 23-Aug-2022 is High*    EP/ NP will review. See interrogation under cardiology tab in the [address] [place] [address] [address] field for more details. Will continue to monitor remotely. (End of 91-day monitoring period 8/23/22).

## 2022-08-26 ENCOUNTER — TELEPHONE (OUTPATIENT)
Dept: FAMILY MEDICINE CLINIC | Age: 77
End: 2022-08-26

## 2022-08-26 ENCOUNTER — ANTI-COAG VISIT (OUTPATIENT)
Dept: FAMILY MEDICINE CLINIC | Age: 77
End: 2022-08-26

## 2022-08-26 LAB — INR BLD: 1.8

## 2022-08-26 NOTE — TELEPHONE ENCOUNTER
Called pt after INR report of 1.8. Per WM pt is to take 2.5 mg one day a week and 5 mg all other days.  Recheck in 2 weeks

## 2022-08-29 LAB
ANION GAP SERPL CALCULATED.3IONS-SCNC: 14 MMOL/L (ref 3–16)
BUN BLDV-MCNC: 30 MG/DL (ref 7–20)
CALCIUM SERPL-MCNC: 8.3 MG/DL (ref 8.3–10.6)
CHLORIDE BLD-SCNC: 99 MMOL/L (ref 99–110)
CO2: 26 MMOL/L (ref 21–32)
CREAT SERPL-MCNC: 1.9 MG/DL (ref 0.6–1.2)
GFR AFRICAN AMERICAN: 31
GFR NON-AFRICAN AMERICAN: 26
GLUCOSE BLD-MCNC: 115 MG/DL (ref 70–99)
POTASSIUM SERPL-SCNC: 3.9 MMOL/L (ref 3.5–5.1)
SODIUM BLD-SCNC: 139 MMOL/L (ref 136–145)

## 2022-08-30 LAB
ESTIMATED AVERAGE GLUCOSE: 137 MG/DL
HBA1C MFR BLD: 6.4 %

## 2022-08-31 ENCOUNTER — OFFICE VISIT (OUTPATIENT)
Dept: FAMILY MEDICINE CLINIC | Age: 77
End: 2022-08-31
Payer: MEDICARE

## 2022-08-31 VITALS
HEART RATE: 81 BPM | DIASTOLIC BLOOD PRESSURE: 60 MMHG | OXYGEN SATURATION: 96 % | BODY MASS INDEX: 31.02 KG/M2 | RESPIRATION RATE: 16 BRPM | TEMPERATURE: 97.2 F | SYSTOLIC BLOOD PRESSURE: 110 MMHG | WEIGHT: 204 LBS

## 2022-08-31 DIAGNOSIS — N17.9 ACUTE KIDNEY INJURY SUPERIMPOSED ON CKD (HCC): ICD-10-CM

## 2022-08-31 DIAGNOSIS — E11.22 TYPE 2 DIABETES MELLITUS WITH STAGE 3B CHRONIC KIDNEY DISEASE, WITH LONG-TERM CURRENT USE OF INSULIN (HCC): Primary | ICD-10-CM

## 2022-08-31 DIAGNOSIS — N18.9 ACUTE KIDNEY INJURY SUPERIMPOSED ON CKD (HCC): ICD-10-CM

## 2022-08-31 DIAGNOSIS — N18.4 STAGE 4 CHRONIC KIDNEY DISEASE (HCC): ICD-10-CM

## 2022-08-31 DIAGNOSIS — I48.19 PERSISTENT ATRIAL FIBRILLATION (HCC): ICD-10-CM

## 2022-08-31 DIAGNOSIS — I38 ACUTE ON CHRONIC SYSTOLIC HEART FAILURE DUE TO VALVULAR DISEASE (HCC): ICD-10-CM

## 2022-08-31 DIAGNOSIS — I50.23 ACUTE ON CHRONIC SYSTOLIC HEART FAILURE DUE TO VALVULAR DISEASE (HCC): ICD-10-CM

## 2022-08-31 DIAGNOSIS — N18.32 TYPE 2 DIABETES MELLITUS WITH STAGE 3B CHRONIC KIDNEY DISEASE, WITH LONG-TERM CURRENT USE OF INSULIN (HCC): Primary | ICD-10-CM

## 2022-08-31 DIAGNOSIS — D68.69 HYPERCOAGULABLE STATE, SECONDARY (HCC): ICD-10-CM

## 2022-08-31 DIAGNOSIS — Z79.4 TYPE 2 DIABETES MELLITUS WITH STAGE 3B CHRONIC KIDNEY DISEASE, WITH LONG-TERM CURRENT USE OF INSULIN (HCC): Primary | ICD-10-CM

## 2022-08-31 PROCEDURE — 99214 OFFICE O/P EST MOD 30 MIN: CPT | Performed by: FAMILY MEDICINE

## 2022-08-31 PROCEDURE — 3044F HG A1C LEVEL LT 7.0%: CPT | Performed by: FAMILY MEDICINE

## 2022-08-31 PROCEDURE — 1123F ACP DISCUSS/DSCN MKR DOCD: CPT | Performed by: FAMILY MEDICINE

## 2022-08-31 RX ORDER — METOPROLOL SUCCINATE 50 MG/1
TABLET, EXTENDED RELEASE ORAL
Qty: 90 TABLET | Refills: 1 | Status: SHIPPED | OUTPATIENT
Start: 2022-08-31

## 2022-08-31 RX ORDER — POTASSIUM CHLORIDE 750 MG/1
TABLET, EXTENDED RELEASE ORAL
Qty: 90 TABLET | Refills: 1 | Status: SHIPPED | OUTPATIENT
Start: 2022-08-31

## 2022-08-31 RX ORDER — INSULIN LISPRO 100 [IU]/ML
INJECTION, SOLUTION INTRAVENOUS; SUBCUTANEOUS
Status: CANCELLED | OUTPATIENT
Start: 2022-08-31

## 2022-08-31 RX ORDER — MONTELUKAST SODIUM 10 MG/1
TABLET ORAL
Qty: 90 TABLET | Refills: 1 | Status: SHIPPED | OUTPATIENT
Start: 2022-08-31

## 2022-08-31 ASSESSMENT — PATIENT HEALTH QUESTIONNAIRE - PHQ9
SUM OF ALL RESPONSES TO PHQ QUESTIONS 1-9: 0
SUM OF ALL RESPONSES TO PHQ9 QUESTIONS 1 & 2: 0
1. LITTLE INTEREST OR PLEASURE IN DOING THINGS: 0
SUM OF ALL RESPONSES TO PHQ QUESTIONS 1-9: 0
SUM OF ALL RESPONSES TO PHQ QUESTIONS 1-9: 0
2. FEELING DOWN, DEPRESSED OR HOPELESS: 0
SUM OF ALL RESPONSES TO PHQ QUESTIONS 1-9: 0

## 2022-08-31 NOTE — PROGRESS NOTES
Subjective:      Patient ID: Jarvis Barajas is a 77 y.o. female. CC: Patient presents for re-evaluation of chronic health problems including diabetes mellitus with chronic kidney disease, acute on chronic kidney failure, acute on chronic heart failure, persistent atrial fibrillation and hypercoagulable status secondary to Coumadin. HPI pt is here for a follow up, med refill, test results in accompaniment of . Since the last office visit patient was admitted to the hospital May 17 and discharged a week later. She was admitted with acute on chronic kidney disease and acute on chronic congestive heart failure. She was evaluated by both cardiology and nephrology care during hospital stay. She was also thought to have acute respiratory infection was treated with antibiotic therapy. Since her hospitalization medications have been adjusted as she is now taking Demadex 40 mg twice daily and still requiring weekly Zaroxolyn to keep her fluid status stable and not overstress her kidneys. She was also taken off Coreg due to hypotension. She has been monitor weight very closely at home with her 's help and her weight is staying stable although it does go up right before she needs Zaroxolyn medication. Blood sugars have been extremely well controlled typically ranging 100-140 throughout the day. She does have some low sugars and she has mild sugars on occasion. Eye exam current (within one year): Yes    Checks sugars at home: yes  Home blood sugar records: patient tests 4 time(s) per day  Any episodes of hypoglycemia?  No    Current medication use: taking as prescribed  Medication side effects: none     Current diet: well balanced  Current exercise:not active   Review of Systems  Patient Active Problem List   Diagnosis    Long term current use of anticoagulant    Hypercholesteremia    Generalized osteoarthrosis, involving multiple sites    Eosinophilic gastritis    Paroxysmal SVT (supraventricular tachycardia) ([facility]    B12 deficiency    History of pulmonary embolism    Multiple pulmonary nodules    Asthma    PAF (paroxysmal atrial fibrillation) ([facility])    Hypertension    Coronary artery disease due to calcified coronary lesion    Nonrheumatic mitral valve regurgitation    Goiter    Primary osteoarthritis of both knees    Splenic infarct    Obesity, Class I, BMI 30-34.9    Chronic systolic CHF (congestive heart failure), NYHA class 3 ([facility])    Thoracic degenerative disc disease    Persistent atrial fibrillation ([facility])    S/P MVR (mitral valve repair)    Ischemic cardiomyopathy    Occlusion and stenosis of bilateral carotid arteries    Pneumatosis intestinalis    Aortic valve stenosis    Deep vein thrombosis (DVT) of non-extremity vein    Acute on chronic systolic heart failure due to valvular disease ([facility])    Stage 4 chronic kidney disease ([facility])    Hypotension    Acute kidney injury superimposed on CKD ([facility])    Acute on chronic systolic CHF (congestive heart failure) ([facility])    Chronic diastolic heart failure ([facility])    Atherosclerosis of superior mesenteric artery ([facility]    ICD (implantable cardioverter-defibrillator) in place    Type 2 diabetes mellitus with stage 3b chronic kidney disease, with long-term current use of insulin ([facility])    Acquired hypothyroidism    Hypercoagulable state, secondary ([facility]    TORIBIO (acute kidney injury) ([facility]    Wheezing       Outpatient Medications Marked as Taking for the 8/31/22 encounter (Office Visit) with Bettyjo Gottron, MD   Medication Sig Dispense Refill    insulin lispro, 1 Unit Dial, (HUMALOG KWIKPEN) 100 UNIT/ML SOPN USE SLIDING SCALE, 140-199, 2 UNITS, 200-249, 3 UNITS, 250-300, 4 UNITS, GREATER THAN 300, INJECT 5 UNITS 15 mL 2    levothyroxine (SYNTHROID) 100 MCG tablet TAKE 1 TABLET DAILY 90 tablet 0    torsemide (DEMADEX) 20 MG tablet 40 mg in am and 40 mg in pm 360 tablet 0    potassium chloride (KLOR-CON M) 10 MEQ extended release tablet TAKE 1 TABLET DAILY 90 tablet 0 metoprolol succinate (TOPROL XL) 50 MG extended release tablet TAKE 1 TABLET DAILY 90 tablet 0    montelukast (SINGULAIR) 10 MG tablet TAKE 1 TABLET NIGHTLY 90 tablet 0    calcitRIOL (ROCALTROL) 0.25 MCG capsule Take 0.25 mcg by mouth daily       metOLazone (ZAROXOLYN) 2.5 MG tablet TAKE 1 TABLET ON TUESDAY AND FRIDAY AND AS DIRECTED 30 tablet 1    spironolactone (ALDACTONE) 25 MG tablet TAKE 1 TABLET TWICE A  tablet 1    vitamin D (ERGOCALCIFEROL) 1.25 MG (99326 UT) CAPS capsule TAKE ONE CAPSULE BY MOUTH ONCE WEEKLY 12 capsule 1    amiodarone (CORDARONE) 200 MG tablet TAKE 1 TABLET DAILY (Patient taking differently: Take 100 mg by mouth daily) 60 tablet 5    nystatin (MYCOSTATIN) 910816 UNIT/ML suspension TAKE ONE TEASPOONFUL BY MOUTH FOUR TIMES A DAY FOR 5 DAYS 120 mL 1    blood glucose test strips (FREESTYLE LITE) strip USE FOUR TIMES A  strip 3    insulin glargine (LANTUS SOLOSTAR) 100 UNIT/ML injection pen INJECT 20 UNITS IN THE MORNING (Patient taking differently: 22 Units INJECT 20 UNITS IN THE MORNING) 90 mL 1    albuterol sulfate  (90 Base) MCG/ACT inhaler USE 2 INHALATIONS EVERY 6 HOURS AS NEEDED FOR WHEEZING 25.5 g 2    ipratropium-albuterol (DUONEB) 0.5-2.5 (3) MG/3ML SOLN nebulizer solution Inhale 3 mLs into the lungs every 4 hours as needed for Shortness of Breath 120 each 0    midodrine (PROAMATINE) 2.5 MG tablet TAKE ONE TABLET BY MOUTH TWICE A DAY (Patient taking differently: as needed) 60 tablet 5    FreeStyle Lancets MISC 1 each by Does not apply route 4 times daily 200 each 5    Blood Glucose Monitoring Suppl (FREESTYLE LITE) GAETANO 1 Device by Does not apply route 4 times daily      oxyCODONE (ROXICODONE) 5 MG immediate release tablet Take 0.5 mg by mouth daily. warfarin (COUMADIN) 5 MG tablet TAKE 1 TABLET DAILY (Patient taking differently: Review INR prior to administration.   Managed by Dr. Rachana Jaime) 100 tablet 3    Insulin Pen Needle (BD PEN NEEDLE JANNET U/F) 32G X 4 MM MISC USE 1 PEN NEEDLE FOUR TIMES A  each 3    magnesium oxide (MAG-OX) 400 MG tablet Take 1 tablet by mouth daily 90 tablet 3    ACETAMINOPHEN PO Take 500 mg by mouth every 6 hours as needed       albuterol (PROVENTIL) (2.5 MG/3ML) 0.083% nebulizer solution Take 3 mLs by nebulization every 6 hours as needed for Wheezing 120 each 3    polyethylene glycol (GLYCOLAX) 17 GM/SCOOP powder Take 17 g by mouth daily       omeprazole (PRILOSEC) 20 MG delayed release capsule Take 20 mg by mouth daily      ondansetron (ZOFRAN) 4 MG tablet Take 1 tablet by mouth 3 times daily as needed for Nausea or Vomiting 30 tablet 0    aspirin 81 MG chewable tablet Take 1 tablet by mouth daily 30 tablet 3    vitamin B-12 500 MCG tablet Take 1 tablet by mouth daily 30 tablet 3       Allergies   Allergen Reactions    Msgcupcx-Gqzlcyt-Tegoeh [Fluocinolone] Shortness Of Breath    Ciprofloxacin Shortness Of Breath    Diovan [Valsartan] Shortness Of Breath    Flagyl [Metronidazole] Shortness Of Breath     Has taken diflucan at home 12/7/15    Metformin And Related [Metformin And Related] Shortness Of Breath    Benazepril      Other reaction(s): Not Recorded    Morphine      Bad reaction. \"makes her feel horrible\". Saxagliptin      Other reaction(s): Not Recorded    Levaquin [Levofloxacin] Rash       Social History     Tobacco Use    Smoking status: Never    Smokeless tobacco: Never   Substance Use Topics    Alcohol use: No       /60 (Site: Left Upper Arm, Position: Sitting, Cuff Size: Large Adult)   Pulse 81   Temp 97.2 °F (36.2 °C) (Infrared)   Resp 16   Wt 204 lb (92.5 kg)   SpO2 96%   BMI 31.02 kg/m²      Objective:   Physical Exam  Constitutional:       General: She is not in acute distress. Appearance: She is well-developed. Neck:      Vascular: No carotid bruit. Cardiovascular:      Rate and Rhythm: Normal rate and regular rhythm.       Pulses:           Dorsalis pedis pulses are 2+ on the right side and 2+ on the left side. Posterior tibial pulses are 2+ on the right side and 2+ on the left side. Heart sounds: Murmur (Right sternal border) heard. Systolic murmur is present with a grade of 2/6. No friction rub. No gallop. Pulmonary:      Effort: Pulmonary effort is normal.      Breath sounds: Normal breath sounds. Abdominal:      General: Bowel sounds are increased. There is distension. Palpations: Abdomen is soft. Tenderness: There is abdominal tenderness in the epigastric area. There is no right CVA tenderness or left CVA tenderness. Musculoskeletal:         General: No tenderness. Normal range of motion. Right lower leg: Edema (Trace ankle edema bilaterally) present. Left lower leg: Edema present. Right foot: Normal.      Left foot: Normal.   Skin:     Findings: No rash. Neurological:      Mental Status: She is alert and oriented to person, place, and time. Sensory: Sensation is intact. Motor: Motor function is intact. Deep Tendon Reflexes: Reflexes are normal and symmetric. Comments: 12 point monofilament test normal    Psychiatric:         Behavior: Behavior is cooperative. Assessment:      Fern Watkins was seen today for follow-up, hypertension, hyperlipidemia and diabetes.     Diagnoses and all orders for this visit:    Type 2 diabetes mellitus with stage 3b chronic kidney disease, with long-term current use of insulin (H[facility])  -     Diabetic Foot Exam    Stage 4 chronic kidney disease ([address]    Persistent atrial fibrillation ([facility])    Hypercoagulable state, secondary ([address]    Acute on chronic systolic heart failure due to valvular disease ([address]    Acute kidney injury superimposed on CKD ([address]    Other orders  -     potassium chloride (KLOR-CON M) 10 MEQ extended release tablet; TAKE 1 TABLET DAILY  -     metoprolol succinate (TOPROL XL) 50 MG extended release tablet; TAKE 1 TABLET DAILY  -     montelukast (SINGULAIR) 10 MG tablet; TAKE 1 TABLET NIGHTLY          Plan:      Reviewed labs and test results with patient and     Diabetes mellitus continues to be extremely well controlled hemoglobin A1c is 6.4 and recommendation to continue current medications. Adjustments to Coumadin have been made since she has been home as she cannot self test and this will need to be monitored closely    Kidney failure has deteriorated but clinically she does appear to be stable both from the kidney standpoint and heart failure. Recommend patient avoid nephrotoxic medications and continue with daily weights and minimize salt in her diet. Continue with both cardiology and nephrology care    Patient received counseling on the following healthy behaviors: nutrition and exercise     Patient given educational materials     Health maintenance updated    Discussed use, benefit, and side effects of prescribed medications. Barriers to medication compliance addressed. All patient questions answered. Pt voiced understanding. Patient needs RTC in 3 months. Medical decision making of moderate complexity. Please note that this chart was generated using Dragon dictation software. Although every effort was made to ensure the accuracy of this automated transcription, some errors in transcription may have occurred.

## 2022-09-06 DIAGNOSIS — I38 CHRONIC SYSTOLIC HEART FAILURE DUE TO VALVULAR DISEASE (HCC): ICD-10-CM

## 2022-09-06 DIAGNOSIS — I50.22 CHRONIC SYSTOLIC HEART FAILURE DUE TO VALVULAR DISEASE (HCC): ICD-10-CM

## 2022-09-06 LAB
ANION GAP SERPL CALCULATED.3IONS-SCNC: 12 MMOL/L (ref 3–16)
BUN BLDV-MCNC: 40 MG/DL (ref 7–20)
CALCIUM SERPL-MCNC: 8.7 MG/DL (ref 8.3–10.6)
CHLORIDE BLD-SCNC: 103 MMOL/L (ref 99–110)
CO2: 28 MMOL/L (ref 21–32)
CREAT SERPL-MCNC: 1.9 MG/DL (ref 0.6–1.2)
GFR AFRICAN AMERICAN: 31
GFR NON-AFRICAN AMERICAN: 26
GLUCOSE BLD-MCNC: 126 MG/DL (ref 70–99)
POTASSIUM SERPL-SCNC: 3.7 MMOL/L (ref 3.5–5.1)
PRO-BNP: 3426 PG/ML (ref 0–449)
SODIUM BLD-SCNC: 143 MMOL/L (ref 136–145)

## 2022-09-07 ENCOUNTER — TELEPHONE (OUTPATIENT)
Dept: CARDIOLOGY CLINIC | Age: 77
End: 2022-09-07

## 2022-09-07 NOTE — TELEPHONE ENCOUNTER
----- Message from SOLEDAD Guerra - CNP sent at 9/7/2022  9:48 AM EDT -----  Labs show fluid number is improving, kidney function stable.  Bobby Snow

## 2022-09-07 NOTE — TELEPHONE ENCOUNTER
----- Message from SOLEDAD Elias CNP sent at 9/7/2022  9:48 AM EDT -----  Labs show fluid number is improving, kidney function stable.  Santa Kim

## 2022-09-09 ENCOUNTER — TELEPHONE (OUTPATIENT)
Dept: FAMILY MEDICINE CLINIC | Age: 77
End: 2022-09-09

## 2022-09-09 ENCOUNTER — ANTI-COAG VISIT (OUTPATIENT)
Dept: FAMILY MEDICINE CLINIC | Age: 77
End: 2022-09-09
Payer: MEDICARE

## 2022-09-09 LAB — INTERNATIONAL NORMALIZATION RATIO, POC: 2

## 2022-09-09 PROCEDURE — 85610 PROTHROMBIN TIME: CPT | Performed by: FAMILY MEDICINE

## 2022-09-14 DIAGNOSIS — I50.22 CHRONIC SYSTOLIC HEART FAILURE DUE TO VALVULAR DISEASE (HCC): ICD-10-CM

## 2022-09-14 DIAGNOSIS — I38 CHRONIC SYSTOLIC HEART FAILURE DUE TO VALVULAR DISEASE (HCC): ICD-10-CM

## 2022-09-14 RX ORDER — TORSEMIDE 20 MG/1
TABLET ORAL
Qty: 360 TABLET | Refills: 3 | Status: SHIPPED | OUTPATIENT
Start: 2022-09-14

## 2022-09-14 RX ORDER — ERGOCALCIFEROL 1.25 MG/1
CAPSULE ORAL
Qty: 12 CAPSULE | Refills: 1 | Status: SHIPPED | OUTPATIENT
Start: 2022-09-14 | End: 2022-11-16 | Stop reason: SDUPTHER

## 2022-09-14 RX ORDER — WARFARIN SODIUM 5 MG/1
TABLET ORAL
Qty: 100 TABLET | Refills: 3 | Status: SHIPPED | OUTPATIENT
Start: 2022-09-14

## 2022-09-15 DIAGNOSIS — E11.69 DIABETES MELLITUS TYPE 2 IN OBESE (HCC): ICD-10-CM

## 2022-09-15 DIAGNOSIS — E66.9 DIABETES MELLITUS TYPE 2 IN OBESE (HCC): ICD-10-CM

## 2022-09-15 RX ORDER — BLOOD-GLUCOSE METER
KIT MISCELLANEOUS
Qty: 200 STRIP | Refills: 3 | Status: SHIPPED | OUTPATIENT
Start: 2022-09-15

## 2022-09-15 RX ORDER — SPIRONOLACTONE 25 MG/1
TABLET ORAL
Qty: 180 TABLET | Refills: 1 | Status: SHIPPED | OUTPATIENT
Start: 2022-09-15

## 2022-09-15 NOTE — TELEPHONE ENCOUNTER
Medication:   Requested Prescriptions     Pending Prescriptions Disp Refills    blood glucose test strips (FREESTYLE LITE) strip [Pharmacy Med Name: FREESTYLE LITE TEST STRIP] 200 strip 3     Sig: USE TO TEST BLOOD SUGAR FOUR TIMES A DAY      Provider out of office. Patient Phone Number: [phone] (home)     Last appt: 8/31/2022   Next appt: 11/16/2022    Last OARRS:   RX Monitoring 3/1/2019   Attestation The Prescription Monitoring Report for this patient was reviewed today.    Periodic Controlled Substance Monitoring -     PDMP Monitoring:    Last PDMP Ernestine Lima as Reviewed [facility]):  Review User Review Instant Review Result   Chika Collins 8/17/2021  8:33 AM Reviewed PDMP [1]     Preferred Pharmacy:   [facility] [place] , [address] [phone] - F [phone]  Count includes the [facility] [address]  Phone: [phone] Fax: [phone]    [facility] [id number] [address] [address]  Phone: [phone] Fax: [phone]

## 2022-09-15 NOTE — TELEPHONE ENCOUNTER
Chief Complaint   Patient presents with     Hospital F/U       Initial /62 (Cuff Size: Adult Regular)  Pulse 64  Temp 97.6  F (36.4  C) (Tympanic)  Ht 6' (1.829 m)  Wt 156 lb 14.4 oz (71.2 kg)  SpO2 96%  BMI 21.28 kg/m2 Estimated body mass index is 21.28 kg/(m^2) as calculated from the following:    Height as of this encounter: 6' (1.829 m).    Weight as of this encounter: 156 lb 14.4 oz (71.2 kg).  Medication Reconciliation: complete   Ivone Charlton CMA (AAMA)       Prescription refill    Last OV:08/02/2022    Last Refill:04/21/2022    Labs:09/06/2022    Future Appt: 11/08/2022

## 2022-09-20 DIAGNOSIS — M17.0 PRIMARY OSTEOARTHRITIS OF BOTH KNEES: ICD-10-CM

## 2022-09-20 RX ORDER — OXYCODONE HYDROCHLORIDE 5 MG/1
5 TABLET ORAL 3 TIMES DAILY PRN
Qty: 90 TABLET | Refills: 0 | Status: SHIPPED | OUTPATIENT
Start: 2022-09-20 | End: 2022-10-20

## 2022-09-20 NOTE — TELEPHONE ENCOUNTER
Pt's  called requesting refill of oxycodone 5mg 3x a day.  Please send to Lucie Suggs in [facility] on [address]

## 2022-09-20 NOTE — TELEPHONE ENCOUNTER
Medication:   Requested Prescriptions     Pending Prescriptions Disp Refills    oxyCODONE (ROXICODONE) 5 MG immediate release tablet 90 tablet 0     Sig: Take 1 tablet by mouth 3 times daily as needed for Pain for up to 30 days. Last Filled:  8/16/22    Patient Phone Number: [phone] (home)     Last appt: 8/31/2022   Next appt: 11/16/2022    Last OARRS:   RX Monitoring 3/1/2019   Attestation The Prescription Monitoring Report for this patient was reviewed today.    Periodic Controlled Substance Monitoring -     PDMP Monitoring:    Last PDMP Chato Evans as Reviewed [facility]):  Review User Review Instant Review Result   Devota Favors 8/17/2021  8:33 AM Reviewed PDMP [1]     Preferred Pharmacy:     [facility] [id number] Spencer Henry, [address] [place] [address] Drive [phone] Spencer Meeks [phone]  [facility] [address]  [address] [place] [address]  Phone: [phone] Fax: [phone]

## 2022-09-21 RX ORDER — PREDNISONE 20 MG/1
20 TABLET ORAL DAILY
Qty: 3 TABLET | Refills: 0 | OUTPATIENT
Start: 2022-09-21 | End: 2022-09-24

## 2022-09-21 NOTE — TELEPHONE ENCOUNTER
Medication:   Requested Prescriptions     Pending Prescriptions Disp Refills    predniSONE (DELTASONE) 20 MG tablet 3 tablet 0     Sig: Take 1 tablet by mouth daily for 3 doses      Last Filled:      Patient Phone Number: [phone] (home)     Last appt: 8/31/2022   Next appt: 11/16/2022    Last OARRS:   RX Monitoring 3/1/2019   Attestation The Prescription Monitoring Report for this patient was reviewed today.    Periodic Controlled Substance Monitoring -     PDMP Monitoring:    Last PDMP Yannick Rodriges as Reviewed [facility]):  Review User Review Instant Review Result   Иван Diana 9/20/2022  1:03 PM Reviewed PDMP [1]     Preferred Pharmacy:     [facility] [id number] Aftab Benedict, [address] [facility] [phone]  [address] [place] [address] Road  [address] [place] [address]  Phone: [phone] Fax: [phone]

## 2022-09-22 LAB — INR BLD: 2

## 2022-09-26 ENCOUNTER — TELEPHONE (OUTPATIENT)
Dept: CARDIOLOGY CLINIC | Age: 77
End: 2022-09-26

## 2022-09-26 ENCOUNTER — TELEPHONE (OUTPATIENT)
Dept: FAMILY MEDICINE CLINIC | Age: 77
End: 2022-09-26

## 2022-09-26 ENCOUNTER — ANTI-COAG VISIT (OUTPATIENT)
Dept: FAMILY MEDICINE CLINIC | Age: 77
End: 2022-09-26

## 2022-09-26 ENCOUNTER — NURSE ONLY (OUTPATIENT)
Dept: CARDIOLOGY CLINIC | Age: 77
End: 2022-09-26
Payer: MEDICARE

## 2022-09-26 DIAGNOSIS — Z95.810 ICD (IMPLANTABLE CARDIOVERTER-DEFIBRILLATOR) IN PLACE: ICD-10-CM

## 2022-09-26 DIAGNOSIS — I25.5 ISCHEMIC CARDIOMYOPATHY: ICD-10-CM

## 2022-09-26 DIAGNOSIS — I50.22 CHRONIC SYSTOLIC CHF (CONGESTIVE HEART FAILURE), NYHA CLASS 3 (HCC): Primary | ICD-10-CM

## 2022-09-26 PROCEDURE — 93297 REM INTERROG DEV EVAL ICPMS: CPT | Performed by: CLINICAL NURSE SPECIALIST

## 2022-09-26 PROCEDURE — G2066 INTER DEVC REMOTE 30D: HCPCS | Performed by: CLINICAL NURSE SPECIALIST

## 2022-09-26 NOTE — TELEPHONE ENCOUNTER
INR 2.0 per RK continue with the same dose and recheck at your schedule time.     Per pt she is taking 2.5 mg Tuesday and 5 mg all other days of coumadin

## 2022-09-26 NOTE — PROGRESS NOTES
Remote transmission received from patient's dual chamber ICD home monitor. Transmission shows normal sensing and pacing function. No new arrhythmias/ events recorded. Possible Optivol fluid accumulation 8/30/22 --- ongoing. Currently elevated around 120, above threshold, with correlating TI trend below reference line approaching back to reference line. TriageF Heart Failure Risk Status on 26-Sep-2022 is High*    NP will review. See interrogation under cardiology tab in the [address] [place] [address] [address] field for more details. Will continue to monitor remotely. (End of 31-day monitoring period 9/26/22).

## 2022-09-26 NOTE — TELEPHONE ENCOUNTER
Is she taking the metolazone every Tuesday and Friday?   If not, she needs to until weight is down 6 pounds and get back on her low sodium/fluid restriction diet  thanks

## 2022-09-26 NOTE — TELEPHONE ENCOUNTER
Spoke to patient her current weight is 205 she will take the metolazone on Tuesday she has went up about 6 pounds, always sob, no edema in legs and feet, her abdomen is distended. No new medications, not watching sodium or fluid intake.  Current dose of  torsemide 40 mg bid

## 2022-09-27 ENCOUNTER — TELEPHONE (OUTPATIENT)
Dept: FAMILY MEDICINE CLINIC | Age: 77
End: 2022-09-27

## 2022-09-27 NOTE — TELEPHONE ENCOUNTER
Received fax form pharmacy requesting prednisone med refill and after looking at the med hx the last time this med was sent was in May for just 6 pills. Advise pt's  and he stated that Dr. Rodríguez Roblero has always sent prescription for 90 days. I looked but I was not able to find anything sent recently.  Pt's  stated he would wait for WM to get in office

## 2022-10-03 RX ORDER — PREDNISONE 10 MG/1
TABLET ORAL
Qty: 100 TABLET | Refills: 1 | Status: SHIPPED | OUTPATIENT
Start: 2022-10-03

## 2022-10-06 LAB — INR BLD: 2.2

## 2022-10-07 ENCOUNTER — TELEPHONE (OUTPATIENT)
Dept: FAMILY MEDICINE CLINIC | Age: 77
End: 2022-10-07

## 2022-10-07 ENCOUNTER — ANTI-COAG VISIT (OUTPATIENT)
Dept: FAMILY MEDICINE CLINIC | Age: 77
End: 2022-10-07

## 2022-10-07 NOTE — TELEPHONE ENCOUNTER
Called pt in regards to her INR.  After review of 2.2 result per WM pt is to continue with same dose and recheck in 2 weeks

## 2022-10-09 DIAGNOSIS — J40 BRONCHITIS: ICD-10-CM

## 2022-10-10 RX ORDER — CEPHALEXIN 500 MG/1
CAPSULE ORAL
Qty: 20 CAPSULE | Refills: 0 | OUTPATIENT
Start: 2022-10-10

## 2022-10-10 NOTE — TELEPHONE ENCOUNTER
Medication:   Requested Prescriptions     Pending Prescriptions Disp Refills    nystatin (MYCOSTATIN) 804055 UNIT/ML suspension [Pharmacy Med Name: NYSTATIN 100,000 UNIT/ML SUSP] 120 mL 1     Sig: TAKE ONE TEASPOONFUL BY MOUTH FOUR TIMES A DAY FOR 5 DAYS          Patient Phone Number: [phone] (home)     Last appt: 8/31/2022   Next appt: 11/16/2022    Last OARRS:   RX Monitoring 3/1/2019   Attestation The Prescription Monitoring Report for this patient was reviewed today.    Periodic Controlled Substance Monitoring -     PDMP Monitoring:    [facility]ed [facility]):  Review User Review Instant Review Result   Sedan Public 9/20/2022  1:03 PM Reviewed PDMP [1]     Preferred Pharmacy:   [facility] [place] , [address] [phone] - F [phone]  [address] [place] [address] [address] [address]  Phone: [phone] Fax: [phone]    [place] [address] [id number] [address] [address]  Phone: [phone] Fax: [phone]

## 2022-10-16 ENCOUNTER — HOSPITAL ENCOUNTER (EMERGENCY)
Age: 77
Discharge: LEFT BEFORE DISCHARGE | End: 2022-10-16

## 2022-10-17 ENCOUNTER — OFFICE VISIT (OUTPATIENT)
Dept: FAMILY MEDICINE CLINIC | Age: 77
End: 2022-10-17
Payer: MEDICARE

## 2022-10-17 ENCOUNTER — TELEPHONE (OUTPATIENT)
Dept: FAMILY MEDICINE CLINIC | Age: 77
End: 2022-10-17

## 2022-10-17 VITALS
OXYGEN SATURATION: 99 % | SYSTOLIC BLOOD PRESSURE: 124 MMHG | HEART RATE: 78 BPM | DIASTOLIC BLOOD PRESSURE: 78 MMHG | TEMPERATURE: 97.8 F

## 2022-10-17 DIAGNOSIS — R82.998 LEUKOCYTES IN URINE: ICD-10-CM

## 2022-10-17 DIAGNOSIS — M54.50 ACUTE BILATERAL LOW BACK PAIN WITHOUT SCIATICA: ICD-10-CM

## 2022-10-17 DIAGNOSIS — M51.34 THORACIC DEGENERATIVE DISC DISEASE: Primary | ICD-10-CM

## 2022-10-17 LAB
BILIRUBIN, POC: NORMAL
BLOOD URINE, POC: NORMAL
CLARITY, POC: CLEAR
COLOR, POC: NORMAL
GLUCOSE URINE, POC: NORMAL
KETONES, POC: NORMAL
LEUKOCYTE EST, POC: NORMAL
NITRITE, POC: NORMAL
PH, POC: 5
PROTEIN, POC: NORMAL
SPECIFIC GRAVITY, POC: 1.01
UROBILINOGEN, POC: NORMAL

## 2022-10-17 PROCEDURE — 81002 URINALYSIS NONAUTO W/O SCOPE: CPT | Performed by: NURSE PRACTITIONER

## 2022-10-17 PROCEDURE — 99213 OFFICE O/P EST LOW 20 MIN: CPT | Performed by: NURSE PRACTITIONER

## 2022-10-17 PROCEDURE — 1123F ACP DISCUSS/DSCN MKR DOCD: CPT | Performed by: NURSE PRACTITIONER

## 2022-10-17 RX ORDER — LIDOCAINE 4 G/G
1 PATCH TOPICAL DAILY
Qty: 1 EACH | Refills: 0 | Status: SHIPPED | OUTPATIENT
Start: 2022-10-17

## 2022-10-17 NOTE — TELEPHONE ENCOUNTER
Called Pt with UA results. Pt seen today for lower back pain. Running urine culture, UA showed trace WBC. Pt requested over the phone a referral to pain management. Please advise.

## 2022-10-17 NOTE — TELEPHONE ENCOUNTER
Called Pt, gave info for pain management to call and make appt. Stated she understood without further questions.

## 2022-10-17 NOTE — PROGRESS NOTES
Jah Denis  : 1945  Encounter date: 10/17/2022    This raghu 77 y.o. female who presents with  Chief Complaint   Patient presents with    Back Pain     Lower left back pain x2wks, sharp shooting       History of present illness:    HPI Pt is 77year old female with chronic thoracic, RLBP x 2 weeks. Pt went to ED yesterday but left without being seen. Pt currently taking prednisone daily and oxycodone TID. Pt reports unable to take muscle relaxers per cardiologist.  Not candidate for NSAID medication due to CKD, stage 4, pt established with neprhology, Dr. Gagan Corcoran. Pt denies burning with urination , denies constipation. Pt reports has received spinal injections in past without relief. Pt not established with pain management. Current Outpatient Medications on File Prior to Visit   Medication Sig Dispense Refill    nystatin (MYCOSTATIN) 334476 UNIT/ML suspension TAKE ONE TEASPOONFUL BY MOUTH FOUR TIMES A DAY FOR 5 DAYS 120 mL 1    predniSONE (DELTASONE) 10 MG tablet TAKE 1 TABLET AS NEEDED (EOSINOPHILIC GASATRITIS) 273 tablet 1    oxyCODONE (ROXICODONE) 5 MG immediate release tablet Take 1 tablet by mouth 3 times daily as needed for Pain for up to 30 days.  90 tablet 0    spironolactone (ALDACTONE) 25 MG tablet TAKE 1 TABLET TWICE A  tablet 1    blood glucose test strips (FREESTYLE LITE) strip USE TO TEST BLOOD SUGAR FOUR TIMES A  strip 3    vitamin D (ERGOCALCIFEROL) 1.25 MG (36296 UT) CAPS capsule TAKE ONE CAPSULE BY MOUTH ONCE WEEKLY 12 capsule 1    torsemide (DEMADEX) 20 MG tablet TAKE 2 TABLETS TWICE A  tablet 3    warfarin (COUMADIN) 5 MG tablet TAKE 1 TABLET DAILY 100 tablet 3    potassium chloride (KLOR-CON M) 10 MEQ extended release tablet TAKE 1 TABLET DAILY 90 tablet 1    metoprolol succinate (TOPROL XL) 50 MG extended release tablet TAKE 1 TABLET DAILY 90 tablet 1    montelukast (SINGULAIR) 10 MG tablet TAKE 1 TABLET NIGHTLY 90 tablet 1    insulin lispro, 1 Unit Dial, (HUMALOG KWIKPEN) 100 UNIT/ML SOPN USE SLIDING SCALE, 140-199, 2 UNITS, 200-249, 3 UNITS, 250-300, 4 UNITS, GREATER THAN 300, INJECT 5 UNITS 15 mL 2    levothyroxine (SYNTHROID) 100 MCG tablet TAKE 1 TABLET DAILY 90 tablet 0    calcitRIOL (ROCALTROL) 0.25 MCG capsule Take 0.25 mcg by mouth daily       metOLazone (ZAROXOLYN) 2.5 MG tablet TAKE 1 TABLET ON TUESDAY AND FRIDAY AND AS DIRECTED 30 tablet 1    amiodarone (CORDARONE) 200 MG tablet TAKE 1 TABLET DAILY (Patient taking differently: Take 100 mg by mouth daily) 60 tablet 5    insulin glargine (LANTUS SOLOSTAR) 100 UNIT/ML injection pen INJECT 20 UNITS IN THE MORNING (Patient taking differently: 22 Units INJECT 20 UNITS IN THE MORNING) 90 mL 1    albuterol sulfate  (90 Base) MCG/ACT inhaler USE 2 INHALATIONS EVERY 6 HOURS AS NEEDED FOR WHEEZING 25.5 g 2    ipratropium-albuterol (DUONEB) 0.5-2.5 (3) MG/3ML SOLN nebulizer solution Inhale 3 mLs into the lungs every 4 hours as needed for Shortness of Breath 120 each 0    midodrine (PROAMATINE) 2.5 MG tablet TAKE ONE TABLET BY MOUTH TWICE A DAY (Patient taking differently: as needed) 60 tablet 5    FreeStyle Lancets MISC 1 each by Does not apply route 4 times daily 200 each 5    Blood Glucose Monitoring Suppl (FREESTYLE LITE) GAETANO 1 Device by Does not apply route 4 times daily      oxyCODONE (ROXICODONE) 5 MG immediate release tablet Take 0.5 mg by mouth daily.       Insulin Pen Needle (BD PEN NEEDLE JANNET U/F) 32G X 4 MM MISC USE 1 PEN NEEDLE FOUR TIMES A  each 3    magnesium oxide (MAG-OX) 400 MG tablet Take 1 tablet by mouth daily 90 tablet 3    ACETAMINOPHEN PO Take 500 mg by mouth every 6 hours as needed       albuterol (PROVENTIL) (2.5 MG/3ML) 0.083% nebulizer solution Take 3 mLs by nebulization every 6 hours as needed for Wheezing 120 each 3    polyethylene glycol (GLYCOLAX) 17 GM/SCOOP powder Take 17 g by mouth daily       omeprazole (PRILOSEC) 20 MG delayed release capsule Take 20 mg by mouth daily      ondansetron (ZOFRAN) 4 MG tablet Take 1 tablet by mouth 3 times daily as needed for Nausea or Vomiting 30 tablet 0    aspirin 81 MG chewable tablet Take 1 tablet by mouth daily 30 tablet 3    vitamin B-12 500 MCG tablet Take 1 tablet by mouth daily 30 tablet 3     No current facility-administered medications on file prior to visit. Allergies   Allergen Reactions    Vfeekdux-Acumnca-Lomdyx [Fluocinolone] Shortness Of Breath    Ciprofloxacin Shortness Of Breath    Diovan [Valsartan] Shortness Of Breath    Flagyl [Metronidazole] Shortness Of Breath     Has taken diflucan at home 12/7/15    Metformin And Related [Metformin And Related] Shortness Of Breath    Benazepril      Other reaction(s): Not Recorded    Morphine      Bad reaction. \"makes her feel horrible\".      Saxagliptin      Other reaction(s): Not Recorded    Levaquin [Levofloxacin] Rash     Past Medical History:   Diagnosis Date    Asthma     Atrial fibrillation (HCC)     Eosinophilia     Hemoptysis     HIGH CHOLESTEROL     Hx of blood clots     Hypertension     Irregular heart beat     Other specified gastritis without mention of hemorrhage     Palpitations     Skin cancer     basal and squamous    Type II or unspecified type diabetes mellitus without mention of complication, not stated as uncontrolled       Past Surgical History:   Procedure Laterality Date    BRONCHOSCOPY  2016    Dr. Craig Patterson - brushings from [address] [facility]  2018    Dr. Meyer [facility] and 5/3 2021    Cardiac cath, cardioversion and rafat     SECTION      CHOLECYSTECTOMY      COLONOSCOPY  2017    Dr. Carlee Brenner - sigmoid diverticulosis, polypectomies x3    COLONOSCOPY  2014    Dr. Carlee Brenner - sigmoid diverticulosis, polypectomies x3, internal hemorrhoids    COLONOSCOPY  10/10/2018    w/biopsy performed by Rickey Wayne MD at [address] [facility] N/A 2020    COLONOSCOPY DIAGNOSTIC performed by Gregory Mojica MD at [address]  08/21/2018    Dr. Rafa Sim - x3 (LIMA-LAD, L SV-D1-PLV) modified BL MAZE procedure w/obliteration of WAYNE using 45mm AtriClip    INSERTABLE Haydee Formosa Left 08/16/2018    Dr. Deann Jung - Candelario Nur # [id number] Medtronic    MITRAL VALVE REPLACEMENT  08/21/2018    Dr. Rafa Sim - 27mm Medtronic Cinch tissue valve    PAIN MANAGEMENT PROCEDURE N/A 12/18/2020    C6-C7 MIDLINE  EPIDURAL STEROID INJECTION WITH FLUOROSCOPY performed by Malia Alva MD at [address] Bilateral 1/18/2021    BILATERAL T11 TRANSFORAMINAL EPIDURAL STEROID INJECTION WITH FLUOROSCOPY performed by Malia Alva MD at [facility]      TRANSESOPHAGEAL ECHOCARDIOGRAM  08/21/2018    during CABG/MVR    TUNNELED VENOUS CATHETER PLACEMENT Left 08/23/2018    Dr. Omar Petty - IJ for HD---since removed    UPPER GASTROINTESTINAL ENDOSCOPY N/A 10/10/2018    w/biopsy performed by Henrietta Lockwood MD at [address]      Family History   Problem Relation Age of Onset    Cancer Father     Asthma Mother     Hypertension Mother     Heart Disease Mother     High Blood Pressure Mother       Social History     Tobacco Use    Smoking status: Never    Smokeless tobacco: Never   Substance Use Topics    Alcohol use: No        Review of Systems    Objective:    /78 (Site: Left Upper Arm, Position: Sitting, Cuff Size: Medium Adult)   Pulse 78   Temp 97.8 °F (36.6 °C) (Infrared)   SpO2 99%         BP Readings from Last 3 Encounters:   10/17/22 124/78   08/31/22 110/60   08/02/22 108/60     Wt Readings from Last 3 Encounters:   08/31/22 204 lb (92.5 kg)   08/02/22 210 lb (95.3 kg)   06/06/22 204 lb (92.5 kg)     BMI Readings from Last 3 Encounters:   08/31/22 31.02 kg/m²   08/02/22 31.93 kg/m²   06/06/22 31.02 kg/m²       Physical Exam  Vitals reviewed. Constitutional:       Appearance: Normal appearance. She is well-developed. She is obese. Cardiovascular:      Rate and Rhythm: Normal rate and regular rhythm. Pulses: Normal pulses. Heart sounds: Normal heart sounds. No murmur heard. Pulmonary:      Effort: Pulmonary effort is normal.      Breath sounds: Normal breath sounds. Musculoskeletal:         General: Tenderness (bilateral thoracic/LBP, poor ROM, chronic) present. No signs of injury. Skin:     General: Skin is warm and dry. Findings: No bruising, erythema or rash. Neurological:      Mental Status: She is alert and oriented to person, place, and time. Motor: Weakness present. Gait: Gait abnormal.       Assessment/Plan    1. Thoracic degenerative disc disease  Advised go to ED if pain worsens  Heating pad and ice  Discussed referral to pain management  - lidocaine 4 % external patch; Place 1 patch onto the skin daily  Dispense: 1 each; Refill: 0  - POCT Urinalysis no Micro    2. Acute bilateral low back pain without sciatica  - POCT Urinalysis no Micro    No follow-ups on file. This dictation was generated by voice recognition computer software. Although all attempts are made to edit the dictation for accuracy, there may be errors in the transcription that are not intended.

## 2022-10-18 LAB
ORGANISM: ABNORMAL
URINE CULTURE, ROUTINE: ABNORMAL

## 2022-10-19 DIAGNOSIS — N30.00 ACUTE CYSTITIS WITHOUT HEMATURIA: Primary | ICD-10-CM

## 2022-10-19 RX ORDER — AMOXICILLIN 500 MG/1
500 CAPSULE ORAL 2 TIMES DAILY
Qty: 20 CAPSULE | Refills: 0 | Status: SHIPPED | OUTPATIENT
Start: 2022-10-19 | End: 2022-10-29

## 2022-10-20 ENCOUNTER — ANTI-COAG VISIT (OUTPATIENT)
Dept: FAMILY MEDICINE CLINIC | Age: 77
End: 2022-10-20

## 2022-10-20 LAB — INR BLD: 3

## 2022-10-20 NOTE — PROGRESS NOTES
Received home INR at 3.0 per Evaristo William in range continue same dose and recheck in 2 weeks. Patient advised.

## 2022-10-30 LAB — INR BLD: 6

## 2022-10-31 ENCOUNTER — TELEPHONE (OUTPATIENT)
Dept: FAMILY MEDICINE CLINIC | Age: 77
End: 2022-10-31

## 2022-11-02 ENCOUNTER — ANTI-COAG VISIT (OUTPATIENT)
Dept: FAMILY MEDICINE CLINIC | Age: 77
End: 2022-11-02

## 2022-11-02 ENCOUNTER — TELEPHONE (OUTPATIENT)
Dept: FAMILY MEDICINE CLINIC | Age: 77
End: 2022-11-02

## 2022-11-02 LAB — INR BLD: 5.7

## 2022-11-04 ENCOUNTER — HOSPITAL ENCOUNTER (OUTPATIENT)
Dept: PHYSICAL THERAPY | Age: 77
Setting detail: THERAPIES SERIES
Discharge: HOME | End: 2022-11-04
Payer: MEDICARE

## 2022-11-04 PROCEDURE — 97162 PT EVAL MOD COMPLEX 30 MIN: CPT

## 2022-11-04 PROCEDURE — 97110 THERAPEUTIC EXERCISES: CPT

## 2022-11-04 NOTE — FLOWSHEET NOTE
[address] [facility]  Phone: [phone]   Fax: [phone]    Physical Therapy Daily Treatment Note    Date:  2022     Patient Name:  Amado Simpson    :  1945  MRN: [id number]  Medical Diagnosis:  Other intervertebral disc degeneration, lumbosacral region [M51.37]  Treatment Diagnosis: weakness of LE's, decreased core stabilization, gait deviations, pain, decreased Lspine ROM, decreased hip ROM all limiting pt's functional mobility, adl's and household chores       Insurance/Certification information:   [employer], no auth needed, no hard max     Physician Information:  Jess Mcdermott MD    Plan of care signed (Y/N): []  Yes [x]  No     Date of Patient follow up with Physician:      Progress Report: []  Yes  [x]  No     Date Range for reporting period:  Beginnin2022  Ending:     Progress report due (10 Rx/or 30 days whichever is less): visit #10 or 18      Recertification due (POC duration/ or 90 days whichever is less): visit #20 or 23     Visit # Insurance Allowable Auth required? Date Range    [place] []  Yes  [x]  No        Units approved Units used Date Range            Latex Allergy:  [x]NO      []YES  Preferred Language for Healthcare:   [x]English       []other:    Functional Scale:           Date assessed:  FOTO physical FS primary measure score = ; risk adjusted =   Will assess    Pain level:  3/10     SUBJECTIVE: Patient stated complaint:Pt states her back has hurt for years and years, but it became unbearable. Pt went to the pain doctor and got a new medicine. Pt states that her back has not hurt as severely since beginning the new medicine. Pt states she needs to strengthen her legs.        OBJECTIVE: B hip flxion 3/5 MMT      Precautions/ Contra-indications: cardiac limitations with SOB during activity, pt has a strong fear of water - no aquatics  Latex Allergy:  [x]NO      []YES    Exercises/Interventions: Therapeutic Exercises (41076) Resistance / level Sets/sec Reps Notes   stepper add      Seated: LAQ  Marching  Hip add squeeze with ball   Hip abd  1  1  1  1 10x  10x  10x  10x                                                     Therapeutic Activities (32946)                                   Gait (70267)                                   Neuromuscular Re-ed (58040)                                                 Manual Intervention (54144)                                                     Modalities: none this date    Pt. Education:  11/04/2022  -patient educated on diagnosis, prognosis and expectations for rehab  -all patient questions were answered    Home Exercise Program:  Seated: LAQ  Marching  Hip add squeeze with ball   Hip abd      Therapeutic Exercise and NMR EXR  [x] (94587) Provided verbal/tactile cueing for activities related to strengthening, flexibility, endurance, ROM for improvements in  [x] LE / Lumbar: LE, proximal hip, and core control with self care, mobility, lifting, ambulation. [] UE / Cervical: cervical, postural, scapular, scapulothoracic and UE control with self care, reaching, carrying, lifting, house/yardwork, driving, computer work. [x] (52789) Provided verbal/tactile cueing for activities related to improving balance, coordination, kinesthetic sense, posture, motor skill, proprioception to assist with   [x] LE / lumbar: LE, proximal hip, and core control in self care, mobility, lifting, ambulation and eccentric single leg control.    [] UE / cervical: cervical, scapular, scapulothoracic and UE control with self care, reaching, carrying, lifting, house/yardwork, driving, computer work.   [] (67858) Therapist is in constant attendance of 2 or more patients providing skilled therapy interventions, but not providing any significant amount of measurable one-on-one time to either patient, for improvements in  [] LE / lumbar: LE, proximal hip, and core control in self care, mobility, lifting, ambulation and eccentric single leg control. [] UE / cervical: cervical, scapular, scapulothoracic and UE control with self care, reaching, carrying, lifting, house/yardwork, driving, computer work. NMR and Therapeutic Activities:    [] (47916 or 76814) Provided verbal/tactile cueing for activities related to improving balance, coordination, kinesthetic sense, posture, motor skill, proprioception and motor activation to allow for proper function of   [] LE: / Lumbar core, proximal hip and LE with self care and ADLs  [] UE / Cervical: cervical, postural, scapular, scapulothoracic and UE control with self care, carrying, lifting, driving, computer work.   [] (64316) Gait Re-education- Provided training and instruction to the patient for proper LE, core and proximal hip recruitment and positioning and eccentric body weight control with ambulation re-education including up and down stairs     Home Management Training / Self Care:  [] (08029) Provided self-care/home management training related to activities of daily living and compensatory training, and/or use of adaptive equipment for improvement with: ADLs and compensatory training, meal preparation, safety procedures and instruction in use of adaptive equipment, including bathing, grooming, dressing, personal hygiene, basic household cleaning and chores.      Home Exercise Program:    [x] (26581) Reviewed/Progressed HEP activities related to strengthening, flexibility, endurance, ROM of   [x] LE / Lumbar: core, proximal hip and LE for functional self-care, mobility, lifting and ambulation/stair navigation   [] UE / Cervical: cervical, postural, scapular, scapulothoracic and UE control with self care, reaching, carrying, lifting, house/yardwork, driving, computer work  [] (06514)Reviewed/Progressed HEP activities related to improving balance, coordination, kinesthetic sense, posture, motor skill, proprioception of   [] LE: core, proximal hip and LE for self care, mobility, lifting, and ambulation/stair navigation    [] UE / Cervical: cervical, postural,  scapular, scapulothoracic and UE control with self care, reaching, carrying, lifting, house/yardwork, driving, computer work    Manual Treatments:  PROM / STM / Oscillations-Mobs:  G-I, II, III, IV (PA's, Inf., Post.)  [] (77192) Provided manual therapy to mobilize LE, proximal hip and/or LS spine soft tissue/joints for the purpose of modulating pain, promoting relaxation,  increasing ROM, reducing/eliminating soft tissue swelling/inflammation/restriction, improving soft tissue extensibility and allowing for proper ROM for normal function with   [] LE / lumbar: self care, mobility, lifting and ambulation. [] UE / Cervical: self care, reaching, carrying, lifting, house/yardwork, driving, computer work. Modalities:  [] (86736) Vasopneumatic compression: Utilized vasopneumatic compression to decrease edema / swelling for the purpose of improving mobility and quad tone / recruitment which will allow for increased overall function including but not limited to self-care, transfers, ambulation, and ascending / descending stairs. Charges:  Timed Code Treatment Minutes: 15   Total Treatment Minutes: 45     [] EVAL - LOW (48144)   [x] EVAL - MOD (68856)  [] EVAL - HIGH (57494)  [] RE-EVAL (75508)  [x] GT(44972) x   1    [] Ionto  [] NMR (63131) x       [] Vaso  [] Manual (16053) x       [] Ultrasound  [] TA x        [] Mech Traction (29242)  [] Aquatic Therapy x     [] ES (un) (87689):   [] Home Management Training x  [] ES(attended) (59692)   [] Dry Needling 1-2 muscles (21045):  [] Dry Needling 3+ muscles (659310)  [] Group:      [] Other:     GOALS:  Patient stated goal: to feel better  [] Progressing: [] Met: [] Not Met: [] Adjusted     Therapist goals for Patient:   Short Term Goals: To be achieved in: 2 weeks  1. Independent in HEP and progression per patient tolerance, in order to prevent re-injury.    [] Progressing: [] Met: [] Not Met: [] Adjusted  2. Patient will have a decrease in pain to facilitate improvement in movement, function, and ADLs as indicated by Functional Deficits. [] Progressing: [] Met: [] Not Met: [] Adjusted     Long Term Goals: To be achieved in: 4-6 weeks  1. FOTO score of at least 60 to assist with reaching prior level of function. [] Progressing: [] Met: [] Not Met: [] Adjusted  2. Patient will demonstrate increased AROM LS mobility: flex 0-65, B SB 0-12, B Rot 0-25, ext 0-15, good hip ROM to allow for proper joint functioning as indicated by patients Functional Deficits. [] Progressing: [] Met: [] Not Met: [] Adjusted  3. Patient will demonstrate an increase in Strength to 4/5 B hip flexion and core activation to allow for proper functional mobility as indicated by patients Functional Deficits. [] Progressing: [] Met: [] Not Met: [] Adjusted  4. Patient will return to functional activities including cooking x15min without increased symptoms or restriction. [] Progressing: [] Met: [] Not Met: [] Adjusted  5. Pt will dress lower body without  assisting pt without pain or difficulty. [] Progressing: [] Met: [] Not Met: [] Adjusted            Overall Progression Towards Functional goals/ Treatment Progress Update:  [] Patient is progressing as expected towards functional goals listed. [] Progression is slowed due to complexities/Impairments listed. [] Progression has been slowed due to co-morbidities.   [x] Plan just implemented, too soon to assess goals progression <30days   [] Goals require adjustment due to lack of progress  [] Patient is not progressing as expected and requires additional follow up with physician  [] Other    Persisting Functional Limitations/Impairments:  []Sleeping []Sitting               [x]Standing []Transfers        [x]Walking []Kneeling               [x]Stairs [x]Squatting / bending   [x]ADLs []Reaching  [x]Lifting  [x]Housework  [x]Driving []Job related tasks  [x]Sports/Recreation []Other:        ASSESSMENT:  See eval  Treatment/Activity Tolerance:  [] Patient able to complete tx [] Patient limited by fatigue  [x] Patient limited by pain  [] Patient limited by other medical complications  [] Other:     Prognosis: [x] Good [] Fair  [] Poor    Patient Requires Follow-up: [x] Yes  [] No    PLAN: Begin PT focusing on: proximal hip mobilizations, LB mobs, LB core activation, proximal hip activation, and HEP     Frequency/Duration:  2 days per week for 4-6 Weeks:  Interventions:  [x]  Therapeutic exercise including: strength training, ROM, for LE, Glutes and core   [x]  NMR activation and proprioception for glutes , LE and Core   [x]  Manual therapy as indicated for Hip complex, LE and spine to include: Dry Needling/IASTM, STM, PROM, Gr I-IV mobilizations, manipulation. [x]  Modalities as needed that may include: thermal agents, E-stim, Biofeedback, US, iontophoresis as indicated  [x]  Patient education on joint protection, postural re-education, activity modification, progression of HEP. [] Continue per plan of care [] Alter current plan (see comments)  [x] Plan of care initiated [] Hold pending MD visit [] Discharge    Electronically signed by: Oriana Leo, PT PT, DPT    Note: If patient does not return for scheduled/ recommended follow up visits, this note will serve as a discharge from care along with most recent update on progress.

## 2022-11-04 NOTE — PLAN OF CARE
[address] [address], [address]  Phone: [phone]   Fax: [phone]     Physical Therapy Certification    Dear Janell Vang MD  ,    We had the pleasure of evaluating the following patient for physical therapy services at [address] [place] [address]. A summary of our findings can be found in the initial assessment below. This includes our plan of care. If you have any questions or concerns regarding these findings, please do not hesitate to contact me at the office phone number checked above. Thank you for the referral.       Physician Signature:_______________________________Date:__________________  By signing above (or electronic signature), therapists plan is approved by physician      Patient: Vivian Torres   : 1945   MRN: [id number]  Referring Physician: Janell Vang MD        Evaluation Date: 2022      Medical Diagnosis Information:  Other intervertebral disc degeneration, lumbosacral region [M51.37]   PT diagnosis: weakness of LE's, decreased core stabilization, gait deviations, pain, decreased Lspine ROM, decreased hip ROM all limiting pt's functional mobility, adl's and household chores                                         Insurance information:  [employer], no auth needed, no hard max     Precautions/ Contra-indications: cardiac limitations with SOB during activity, pt has a strong fear of water - no aquatics  Latex Allergy:  [x]NO      []YES  Preferred Language for Healthcare:   [x]English       []Other:    C-SSRS Triggered by Intake questionnaire (Past 2 wk assessment ):   [x] No, Questionnaire did not trigger screening.   [] Yes, Patient intake triggered C-SSRS Screening     [] Completed, no further action required. [] Completed, PCP notified via Epic    SUBJECTIVE: Patient stated complaint:Pt states her back has hurt for years and years, but it became unbearable.   Pt went to the pain doctor and got a new medicine. Pt states that her back has not hurt as severely since beginning the new medicine. Pt states she needs to strengthen her legs. Fear avoidance: I should not do physical activities that (might) make my pain worse   [x] True   [] False     Relevant Medical History:h/o CVA, SOB, Afib, DM, high cholesterol, h/o DVT's  Functional Outcome: FOTO physical FS primary measure score = will assess; Risk adjusted = will assess    Pain Scale: 3/10  Easing factors: medication  Provocative factors: pt states there is no rhythm or reason as to why it starts hurting     Type: [x]Constant   []Intermittent  []Radiating []Localized []other:     Numbness/Tingling: na    Occupation/School: retired     Living Status/Prior Level of Function: Prior to this injury / incident, pt was independent with ADLs and IADLs, indep with adl's prior to back pain. Pt states it takes a long time to get dressed so her  helps her - maritza with lower body dressing and  gets her clothes out. Pt has a walk in tub. Pt states if she stands for 10min in the kitchen, her back pain is terrible.  cooks, bakes, cleans, mops. Pt states that a lot of the activity limitation is from her SOB - with cardiac issues.     OBJECTIVE:   Palpation: TTP mid Lspine    Functional Mobility/Transfers: indep with UE support    Posture: scoliosis noted    Inspection: scoliosis noted with R rotation of thoracic spine, mild C curvature noted with concavity on L    Gait: (include devices/WB status) decreased hip/trunk dissociation, decreased UE swing    Bandages/Dressings/Incisions: NA    Dermatomes Normal Abnormal Comments   inguinal area (L1)  x     anterior mid-thigh (L2) x     distal ant thigh/med knee (L3) x     medial lower leg and foot (L4) x     lateral lower leg and foot (L5) x     posterior calf (S1) x     medial calcaneus (S2) x             ROM  Comments   Lumbar Flex 0-55    Lumbar Ext 0-10      ROM LEFT RIGHT Comments   Lumbar Side Bend 0-5 0-10    Lumbar Rotation 0-20 0-20    Hip Flexion 0-110 0-110    Hip Abd      Hip ER 0 0-10    Hip IR      Hip Extension      Knee Ext      Knee Flex      Hamstring Flex      Piriformis                      Joint mobility: Lspine, B hip   []Normal    [x]Hypo   []Hyper      Strength / Myotomes LEFT RIGHT Comments   Multifidus      Transverse Ab 3 3    Hip Flexors (L1-2) 3 3    Hip Abductors 4 4    Hip Extensors 3 3    Hip Internal Rotators      Hip External Rotators      Quads (L2-4) 4 4    Hamstrings  4 4    Ankle Plantarflexion (S1-2) 5 5    Ankle Dorsiflexion (L4-5) 5 5    Ankle Inversion      Ankle Eversion (S1-2)      Great Toe Extension (L5)          Neural dynamic tension testing Normal Abnormal Comments   Slump Test  - Degree of knee flexion:       SLR       0-30      30-70 x     Femoral nerve (L2-4)          Orthopedic Special Tests:    Normal Abnormal N/A Comments   Toe walk         Heel Walk       Fwd Bend-aberrant or innominate mvmt)       Standing Flexion test       Trendelenburg x      Kemps/Quadrant       Stork       SHAUNA/Mauricio  x     Hip scour  x     SLR x      Crossed SLR       Supine to sit       Hip thrust       SI distraction/compression       PA/Spring       Prone Instability test       Prone knee bend       Sacral Spring/thrust                  [x] Patient history, allergies, meds reviewed. Medical chart reviewed. See intake form. Review Of Systems (ROS):  [x]Performed Review of systems (Integumentary, CardioPulmonary, Neurological) by intake and observation. Intake form has been scanned into medical record. Patient has been instructed to contact their primary care physician regarding ROS issues if not already being addressed at this time.       Co-morbidities/Complexities (which will affect course of rehabilitation):   []None        []Hx of COVID   Arthritic conditions   []Rheumatoid arthritis (M05.9)  [x]Osteoarthritis (M19.91)  []Gout Cardiovascular conditions   [x]Hypertension (I10)  [x]Hyperlipidemia (E78.5)  []Angina pectoris (I20)  []Atherosclerosis (I70)  []Pacemaker  [x]Hx of CABG/stent/  cardiac surgeries   Musculoskeletal conditions   []Disc pathology   []Congenital spine pathologies   []Osteoporosis (M81.8)  []Osteopenia (M85.8)  []Scoliosis       Endocrine conditions   []Hypothyroid (E03.9)  []Hyperthyroid Gastrointestinal conditions   []Constipation (X05.69)   Metabolic conditions   []Morbid obesity (E66.01)  [x]Diabetes type 1(E10.65) or 2 (E11.65)   []Neuropathy (G60.9)     Cardio/Pulmonary conditions   []Asthma (J45)  []Coughing   []COPD (J44.9)  []CHF  [x]A-fib   Psychological Disorders  []Anxiety (F41.9)  []Depression (F32.9)   []Other:   Developmental Disorders  []Autism (F84.0)  []CP (G80)  []Down Syndrome (Q90.9)  []Developmental delay     Neurological conditions  [x]Prior Stroke (I69.30)  []Parkinson's (G20)  []Encephalopathy (G93.40)  []MS (G35)  []Post-polio (G14)  []SCI  []TBI  []ALS Other conditions  []Fibromyalgia (M79.7)  []Vertigo  []Syncope  []Kidney Failure  []Cancer      []currently undergoing                treatment  []Pregnancy  []Incontinence   Prior surgeries  []involved limb  []previous spinal surgery  [x] section birth  []hysterectomy  []bowel / bladder surgery  []other relevant surgeries   []Other:               Barriers to/and or personal factors that will affect rehab potential:              [x]Age  []Sex    []Smoker              [x]Motivation/Lack of Motivation                        [x]Co-Morbidities              []Cognitive Function, education/learning barriers              []Environmental, home barriers              []profession/work barriers  []past PT/medical experience  [x]other:possitivie support from   Justification:     Falls Risk Assessment (30 days):   [x] Falls Risk assessed and no intervention required.   [] Falls Risk assessed and Patient requires intervention due to being higher risk   TUG score (>12s at risk):     [] Falls education provided, including         ASSESSMENT:   Functional Impairments:     [x]Noted lumbar/proximal hip hypomobility   []Noted lumbosacral and/or generalized hypermobility   [x]Decreased Lumbosacral/hip/LE functional ROM   [x]Decreased core/proximal hip strength and neuromuscular control    [x]Decreased LE functional strength    []Abnormal reflexes/sensation/myotomal/dermatomal deficits  []Reduced balance/proprioceptive control    []other:      Functional Activity Limitations (from functional questionnaire and intake)   [x]Reduced ability to tolerate prolonged functional positions   [x]Reduced ability or difficulty with changes of positions or transfers between positions   []Reduced ability to maintain good posture and demonstrate good body mechanics with sitting, bending, and lifting   [x]Reduced ability to sleep   [] Reduced ability or tolerance with driving and/or computer work   [x]Reduced ability to perform lifting, reaching, carrying tasks   [x]Reduced ability to squat   [x]Reduced ability to forward bend   [x]Reduced ability to ambulate prolonged functional periods/distances/surfaces   [x]Reduced ability to ascend/descend stairs   []other:       Participation Restrictions   [x]Reduced participation in self care activities   [x]Reduced participation in home management activities   []Reduced participation in work activities   [x]Reduced participation in social activities. []Reduced participation in sport/recreational activities. Classification:   []Signs/symptoms consistent with Lumbar instability/stabilization subgroup. []Signs/symptoms consistent with Lumbar mobilization/manipulation subgroup, myotomes and dermatomes intact. Meets manipulation criteria.     []Signs/symptoms consistent with Lumbar direction specific/centralization subgroup   []Signs/symptoms consistent with Lumbar traction subgroup     [x]Signs/symptoms consistent with lumbar facet dysfunction   [x]Signs/symptoms consistent with lumbar stenosis type dysfunction   [x]Signs/symptoms consistent with nerve root involvement including myotome & dermatome dysfunction   []Signs/symptoms consistent with post-surgical status including: decreased ROM, strength and function. []signs/symptoms consistent with pathology which may benefit from Dry needling     []other:      Prognosis/Rehab Potential:      []Excellent   [x]Good    []Fair   []Poor    Tolerance of evaluation/treatment:    []Excellent   [x]Good    []Fair   []Poor     Physical Therapy Evaluation Complexity Justification  [x] A history of present problem with:  [] no personal factors and/or comorbidities that impact the plan of care;  [x]1-2 personal factors and/or comorbidities that impact the plan of care  []3 personal factors and/or comorbidities that impact the plan of care  [x] An examination of body systems using standardized tests and measures addressing any of the following: body structures and functions (impairments), activity limitations, and/or participation restrictions;:  [] a total of 1-2 or more elements   [x] a total of 3 or more elements   [] a total of 4 or more elements   [x] A clinical presentation with:  [] stable and/or uncomplicated characteristics   [x] evolving clinical presentation with changing characteristics  [] unstable and unpredictable characteristics;   [x] Clinical decision making of [] low, [x] moderate, [] high complexity using standardized patient assessment instrument and/or measurable assessment of functional outcome.     [] EVAL (LOW) 84992 (typically 15 minutes face-to-face)  [x] EVAL (MOD) 61385 (typically 30 minutes face-to-face)  [] EVAL (HIGH) 91795 (typically 45 minutes face-to-face)  [] RE-EVAL     PLAN: Begin PT focusing on: proximal hip mobilizations, LB mobs, LB core activation, proximal hip activation, and HEP    Frequency/Duration:  2 days per week for 4-6 Weeks:  Interventions:  [x] Therapeutic exercise including: strength training, ROM, for LE, Glutes and core   [x]  NMR activation and proprioception for glutes , LE and Core   [x]  Manual therapy as indicated for Hip complex, LE and spine to include: Dry Needling/IASTM, STM, PROM, Gr I-IV mobilizations, manipulation. [x]  Modalities as needed that may include: thermal agents, E-stim, Biofeedback, US, iontophoresis as indicated  [x]  Patient education on joint protection, postural re-education, activity modification, progression of HEP. HEP instruction: Written HEP instructions provided and reviewed. GOALS:  Patient stated goal: to feel better  [] Progressing: [] Met: [] Not Met: [] Adjusted    Therapist goals for Patient:   Short Term Goals: To be achieved in: 2 weeks  1. Independent in HEP and progression per patient tolerance, in order to prevent re-injury. [] Progressing: [] Met: [] Not Met: [] Adjusted  2. Patient will have a decrease in pain to facilitate improvement in movement, function, and ADLs as indicated by Functional Deficits. [] Progressing: [] Met: [] Not Met: [] Adjusted    Long Term Goals: To be achieved in: 4-6 weeks  1. FOTO score of at least 60 to assist with reaching prior level of function. [] Progressing: [] Met: [] Not Met: [] Adjusted  2. Patient will demonstrate increased AROM LS mobility: flex 0-65, B SB 0-12, B Rot 0-25, ext 0-15, good hip ROM to allow for proper joint functioning as indicated by patients Functional Deficits. [] Progressing: [] Met: [] Not Met: [] Adjusted  3. Patient will demonstrate an increase in Strength to 4/5 B hip flexion and core activation to allow for proper functional mobility as indicated by patients Functional Deficits. [] Progressing: [] Met: [] Not Met: [] Adjusted  4. Patient will return to functional activities including cooking x15min without increased symptoms or restriction. [] Progressing: [] Met: [] Not Met: [] Adjusted  5.  Pt will dress lower body without  assisting pt without pain or difficulty.      [] Progressing: [] Met: [] Not Met: [] Adjusted     Electronically signed by:  Victoriano Serrano, PT

## 2022-11-07 ENCOUNTER — HOSPITAL ENCOUNTER (OUTPATIENT)
Dept: PHYSICAL THERAPY | Age: 77
Setting detail: THERAPIES SERIES
Discharge: HOME | End: 2022-11-07
Payer: MEDICARE

## 2022-11-07 ENCOUNTER — NURSE ONLY (OUTPATIENT)
Dept: CARDIOLOGY CLINIC | Age: 77
End: 2022-11-07
Payer: MEDICARE

## 2022-11-07 DIAGNOSIS — I25.5 ISCHEMIC CARDIOMYOPATHY: ICD-10-CM

## 2022-11-07 DIAGNOSIS — I50.22 CHRONIC SYSTOLIC CHF (CONGESTIVE HEART FAILURE), NYHA CLASS 3 (HCC): Primary | ICD-10-CM

## 2022-11-07 DIAGNOSIS — Z95.810 ICD (IMPLANTABLE CARDIOVERTER-DEFIBRILLATOR) IN PLACE: ICD-10-CM

## 2022-11-07 PROCEDURE — 97110 THERAPEUTIC EXERCISES: CPT

## 2022-11-07 PROCEDURE — 93297 REM INTERROG DEV EVAL ICPMS: CPT | Performed by: CLINICAL NURSE SPECIALIST

## 2022-11-07 PROCEDURE — G2066 INTER DEVC REMOTE 30D: HCPCS | Performed by: CLINICAL NURSE SPECIALIST

## 2022-11-07 NOTE — FLOWSHEET NOTE
[address] [facility]  Phone: [phone]   Fax: [phone]    Physical Therapy Daily Treatment Note    Date:  2022     Patient Name:  Alfonso Rodriguez    :  1945  MRN: [id number]  Medical Diagnosis:  Other intervertebral disc degeneration, lumbosacral region [M51.37]  Treatment Diagnosis: weakness of LE's, decreased core stabilization, gait deviations, pain, decreased Lspine ROM, decreased hip ROM all limiting pt's functional mobility, adl's and household chores       Insurance/Certification information:   [employer], autumn auth needed, no hard max     Physician Information:  Ranjit Wilder MD    Plan of care signed (Y/N): []  Yes [x]  No     Date of Patient follow up with Physician:      Progress Report: []  Yes  [x]  No     Date Range for reporting period:  Beginnin2022  Ending:     Progress report due (10 Rx/or 30 days whichever is less): visit #10 or 86/3/03      Recertification due (POC duration/ or 90 days whichever is less): visit #20 or 23     Visit # Insurance Allowable Auth required? Date Range    [place] []  Yes  [x]  No        Units approved Units used Date Range            Latex Allergy:  [x]NO      []YES  Preferred Language for Healthcare:   [x]English       []other:    Functional Scale:           Date assessed:  FOTO physical FS primary measure score = ; risk adjusted =   Will assess    Pain level:  0-1/10     SUBJECTIVE: Pt reports having significant relief of back pain with start of new medicine last week. Reports only feeling slight discomfort \"I still know it's there but it doesn't really bother me\".   Reports having no difficulty with HEP since initial eval.        OBJECTIVE: B hip flxion 3/5 MMT      Precautions/ Contra-indications: cardiac limitations with SOB during activity, pt has a strong fear of water - no aquatics  Latex Allergy:  [x]NO      []YES    Exercises/Interventions:     Therapeutic Exercises (41711) Resistance / level Sets/sec Reps Notes   stepper add 3'     Seated: LAQ  Marching  Hip add squeeze with ball   Hip abd     lime 1  1  1  1 15x  15x  15x  15x    Standing calf stretch  3 30\" 11/7   Standing HS stretch 4\" step  3 30\" 11/7   Supine abdominal isometrics  5\" 10\" 11/7   Supine bridges  1 10 11/7   Sit to stand from EOB 24 inches 1 10 11/7                 Therapeutic Activities (99698)                                   Gait (91499)                                   Neuromuscular Re-ed (87884)                                                 Manual Intervention (46598)                                                     Modalities: none this date    Pt. Education:  11/07/2022  - educated on improving core strength and LE strength for improved function    Home Exercise Program:  Seated: LAQ  Marching  Hip add squeeze with ball   Hip abd      Therapeutic Exercise and NMR EXR  [x] (05138) Provided verbal/tactile cueing for activities related to strengthening, flexibility, endurance, ROM for improvements in  [x] LE / Lumbar: LE, proximal hip, and core control with self care, mobility, lifting, ambulation. [] UE / Cervical: cervical, postural, scapular, scapulothoracic and UE control with self care, reaching, carrying, lifting, house/yardwork, driving, computer work. [x] (30857) Provided verbal/tactile cueing for activities related to improving balance, coordination, kinesthetic sense, posture, motor skill, proprioception to assist with   [x] LE / lumbar: LE, proximal hip, and core control in self care, mobility, lifting, ambulation and eccentric single leg control.    [] UE / cervical: cervical, scapular, scapulothoracic and UE control with self care, reaching, carrying, lifting, house/yardwork, driving, computer work.   [] (69294) Therapist is in constant attendance of 2 or more patients providing skilled therapy interventions, but not providing any significant amount of measurable one-on-one time to either patient, for improvements in  [] LE / lumbar: LE, proximal hip, and core control in self care, mobility, lifting, ambulation and eccentric single leg control. [] UE / cervical: cervical, scapular, scapulothoracic and UE control with self care, reaching, carrying, lifting, house/yardwork, driving, computer work. NMR and Therapeutic Activities:    [] (68361 or 85599) Provided verbal/tactile cueing for activities related to improving balance, coordination, kinesthetic sense, posture, motor skill, proprioception and motor activation to allow for proper function of   [] LE: / Lumbar core, proximal hip and LE with self care and ADLs  [] UE / Cervical: cervical, postural, scapular, scapulothoracic and UE control with self care, carrying, lifting, driving, computer work.   [] (41898) Gait Re-education- Provided training and instruction to the patient for proper LE, core and proximal hip recruitment and positioning and eccentric body weight control with ambulation re-education including up and down stairs     Home Management Training / Self Care:  [] (89490) Provided self-care/home management training related to activities of daily living and compensatory training, and/or use of adaptive equipment for improvement with: ADLs and compensatory training, meal preparation, safety procedures and instruction in use of adaptive equipment, including bathing, grooming, dressing, personal hygiene, basic household cleaning and chores.      Home Exercise Program:    [x] (00407) Reviewed/Progressed HEP activities related to strengthening, flexibility, endurance, ROM of   [x] LE / Lumbar: core, proximal hip and LE for functional self-care, mobility, lifting and ambulation/stair navigation   [] UE / Cervical: cervical, postural, scapular, scapulothoracic and UE control with self care, reaching, carrying, lifting, house/yardwork, driving, computer work  [] (89555)Reviewed/Progressed HEP activities related to improving balance, coordination, kinesthetic sense, posture, motor skill, proprioception of   [] LE: core, proximal hip and LE for self care, mobility, lifting, and ambulation/stair navigation    [] UE / Cervical: cervical, postural,  scapular, scapulothoracic and UE control with self care, reaching, carrying, lifting, house/yardwork, driving, computer work    Manual Treatments:  PROM / STM / Oscillations-Mobs:  G-I, II, III, IV (PA's, Inf., Post.)  [] (01863) Provided manual therapy to mobilize LE, proximal hip and/or LS spine soft tissue/joints for the purpose of modulating pain, promoting relaxation,  increasing ROM, reducing/eliminating soft tissue swelling/inflammation/restriction, improving soft tissue extensibility and allowing for proper ROM for normal function with   [] LE / lumbar: self care, mobility, lifting and ambulation. [] UE / Cervical: self care, reaching, carrying, lifting, house/yardwork, driving, computer work. Modalities:  [] (90026) Vasopneumatic compression: Utilized vasopneumatic compression to decrease edema / swelling for the purpose of improving mobility and quad tone / recruitment which will allow for increased overall function including but not limited to self-care, transfers, ambulation, and ascending / descending stairs. Charges:  Timed Code Treatment Minutes: 40   Total Treatment Minutes: 40     [] EVAL - LOW (65839)   [] EVAL - MOD (99060)  [] EVAL - HIGH (72936)  [] RE-EVAL (33968)  [x] ZL(70077) x   3    [] Ionto  [] NMR (53337) x       [] Vaso  [] Manual (04554) x       [] Ultrasound  [] TA x        [] Mech Traction (73148)  [] Aquatic Therapy x     [] ES (un) (17286):   [] Home Management Training x  [] ES(attended) (45117)   [] Dry Needling 1-2 muscles (83567):  [] Dry Needling 3+ muscles (431298)  [] Group:      [] Other:     GOALS:  Patient stated goal: to feel better  [] Progressing: [] Met: [] Not Met: [] Adjusted     Therapist goals for Patient:   Short Term Goals:  To be achieved in: 2 weeks  1. Independent in HEP and progression per patient tolerance, in order to prevent re-injury. [] Progressing: [] Met: [] Not Met: [] Adjusted  2. Patient will have a decrease in pain to facilitate improvement in movement, function, and ADLs as indicated by Functional Deficits. [] Progressing: [] Met: [] Not Met: [] Adjusted     Long Term Goals: To be achieved in: 4-6 weeks  1. FOTO score of at least 60 to assist with reaching prior level of function. [] Progressing: [] Met: [] Not Met: [] Adjusted  2. Patient will demonstrate increased AROM LS mobility: flex 0-65, B SB 0-12, B Rot 0-25, ext 0-15, good hip ROM to allow for proper joint functioning as indicated by patients Functional Deficits. [] Progressing: [] Met: [] Not Met: [] Adjusted  3. Patient will demonstrate an increase in Strength to 4/5 B hip flexion and core activation to allow for proper functional mobility as indicated by patients Functional Deficits. [] Progressing: [] Met: [] Not Met: [] Adjusted  4. Patient will return to functional activities including cooking x15min without increased symptoms or restriction. [] Progressing: [] Met: [] Not Met: [] Adjusted  5. Pt will dress lower body without  assisting pt without pain or difficulty. [] Progressing: [] Met: [] Not Met: [] Adjusted            Overall Progression Towards Functional goals/ Treatment Progress Update:  [] Patient is progressing as expected towards functional goals listed. [] Progression is slowed due to complexities/Impairments listed. [] Progression has been slowed due to co-morbidities.   [x] Plan just implemented, too soon to assess goals progression <30days   [] Goals require adjustment due to lack of progress  [] Patient is not progressing as expected and requires additional follow up with physician  [] Other    Persisting Functional Zabrina Pineda [id number]    I have read and agree with the above note. Oz PEREIRA 0056    Note: If patient does not return for scheduled/ recommended follow up visits, this note will serve as a discharge from care along with most recent update on progress.

## 2022-11-07 NOTE — PROGRESS NOTES
Remote transmission received from patient's dual chamber ICD home monitor. Transmission shows normal sensing and pacing function. No new arrhythmias/ events recorded. PVC burden 123.9 p/hour    Optivol is WNL. TriageHF Heart Failure Risk Status on 17-Nov-2022 is Medium    NP will review. See interrogation under cardiology tab in the [address] [place] [address] [address] field for more details. Will continue to monitor remotely.     (End of 31-day monitoring period 11/7/22)

## 2022-11-08 ENCOUNTER — OFFICE VISIT (OUTPATIENT)
Dept: CARDIOLOGY CLINIC | Age: 77
End: 2022-11-08
Payer: MEDICARE

## 2022-11-08 VITALS
HEART RATE: 72 BPM | SYSTOLIC BLOOD PRESSURE: 110 MMHG | WEIGHT: 202 LBS | HEIGHT: 68 IN | DIASTOLIC BLOOD PRESSURE: 70 MMHG | OXYGEN SATURATION: 95 % | BODY MASS INDEX: 30.62 KG/M2

## 2022-11-08 DIAGNOSIS — I35.0 NONRHEUMATIC AORTIC VALVE STENOSIS: ICD-10-CM

## 2022-11-08 DIAGNOSIS — Z95.1 S/P CABG X 3: ICD-10-CM

## 2022-11-08 DIAGNOSIS — Z95.810 ICD (IMPLANTABLE CARDIOVERTER-DEFIBRILLATOR) IN PLACE: ICD-10-CM

## 2022-11-08 DIAGNOSIS — I48.0 PAF (PAROXYSMAL ATRIAL FIBRILLATION) (HCC): ICD-10-CM

## 2022-11-08 DIAGNOSIS — I95.9 HYPOTENSION, UNSPECIFIED HYPOTENSION TYPE: ICD-10-CM

## 2022-11-08 DIAGNOSIS — I50.22 CHRONIC SYSTOLIC CHF (CONGESTIVE HEART FAILURE), NYHA CLASS 3 (HCC): ICD-10-CM

## 2022-11-08 DIAGNOSIS — N18.30 STAGE 3 CHRONIC KIDNEY DISEASE, UNSPECIFIED WHETHER STAGE 3A OR 3B CKD (HCC): ICD-10-CM

## 2022-11-08 DIAGNOSIS — R30.9 PAINFUL URINATION: ICD-10-CM

## 2022-11-08 DIAGNOSIS — I50.22 CHRONIC SYSTOLIC CHF (CONGESTIVE HEART FAILURE), NYHA CLASS 3 (HCC): Primary | ICD-10-CM

## 2022-11-08 PROCEDURE — 81003 URINALYSIS AUTO W/O SCOPE: CPT | Performed by: CLINICAL NURSE SPECIALIST

## 2022-11-08 PROCEDURE — 1123F ACP DISCUSS/DSCN MKR DOCD: CPT | Performed by: CLINICAL NURSE SPECIALIST

## 2022-11-08 PROCEDURE — 3078F DIAST BP <80 MM HG: CPT | Performed by: CLINICAL NURSE SPECIALIST

## 2022-11-08 PROCEDURE — 3074F SYST BP LT 130 MM HG: CPT | Performed by: CLINICAL NURSE SPECIALIST

## 2022-11-08 PROCEDURE — 99214 OFFICE O/P EST MOD 30 MIN: CPT | Performed by: CLINICAL NURSE SPECIALIST

## 2022-11-08 RX ORDER — ACETAMINOPHEN AND CODEINE PHOSPHATE 300; 30 MG/1; MG/1
TABLET ORAL
COMMUNITY
Start: 2022-10-26 | End: 2022-11-16

## 2022-11-08 NOTE — PROGRESS NOTES
[facility]  Progress Note    Primary Care Doctor:  Shanice Abad MD    Chief Complaint   Patient presents with    Follow-up    Congestive Heart Failure        History of Present Illness:  77 y.o. female with history of CAD/CABG in 2018, PAF with maze , HTN, HLD, DVT/PE on coumadin, 2 CVs unsuccessful  -3/10/21 for small bowel pneumatosis with loculated pneumoperitoneum (IV zoysn and TPN), acute on chronic sHF (torsemide decreased at discharge, aldactone was held and coreg dose decreased due to hypotension), TORIBIO on CKD, PAF  ICD placed 2021  covid (antibodies, steroids and antibiotics). She was on prednisone  to  (off for 2 days). - for persistent cough, chest congestion pneumonia after failed po rounds of antibiotics, CT abdomen with pneumatosis and mesenteric gas and inflammation started on antibiotics which she has a couple days left. I had the pleasure of seeing Yesenia Neri in follow up for systolic heart failure. She is in a wheel chair and her , Marlo Crooked. Her optival shows done yesterday shows normal thoracic impedence. Her weight at home is 198-200, taking metolazone once a week. She went to pain clinic and given tylenol #3 with codeine, took 1 dose and no further pain. She is having issues with thrush and sores in her mouth which are not clearing with nystatin. She denies any chest pain, palpitations, lightheadedness or edema. She does complain of symptoms of UTI    Past Medical History:   has a past medical history of Asthma, Atrial fibrillation ([address] Eosinophilia, Hemoptysis, HIGH CHOLESTEROL, Hx of blood clots, Hypertension, Irregular heart beat, Other specified gastritis without mention of hemorrhage, Palpitations, Skin cancer, and Type II or unspecified type diabetes mellitus without mention of complication, not stated as uncontrolled. Surgical History:   has a past surgical history that includes Cholecystectomy;   section; Colonoscopy (02/07/2017); skin biopsy; bronchoscopy (07/18/2016); Coronary artery bypass graft (08/21/2018); Mitral valve replacement (08/21/2018); transesophageal echocardiogram (08/21/2018); Tunneled venous catheter placement (Left, 08/23/2018); Cardiac catheterization (08/16/2018); Insertable Cardiac Monitor (Left, 08/16/2018); Colonoscopy (01/17/2014); Upper gastrointestinal endoscopy (N/A, 10/10/2018); Colonoscopy (10/10/2018); Colonoscopy (N/A, 8/7/2020); Pain management procedure (N/A, 12/18/2020); Pain management procedure (Bilateral, 1/18/2021); and Cardiac catheterization (5/2 and 5/3 2021). Social History:   reports that she has never smoked. She has never used smokeless tobacco. She reports that she does not drink alcohol and does not use drugs. Family History:   Family History   Problem Relation Age of Onset    Cancer Father     Asthma Mother     Hypertension Mother     Heart Disease Mother     High Blood Pressure Mother        Home Medications:  Prior to Admission medications    Medication Sig Start Date End Date Taking?  Authorizing Provider   lidocaine 4 % external patch Place 1 patch onto the skin daily 10/17/22  Yes Monica Palencia APRN - [place]   nystatin (MYCOSTATIN) 581901 UNIT/ML suspension TAKE ONE TEASPOONFUL BY MOUTH FOUR TIMES A DAY FOR 5 DAYS 10/10/22  Yes Re Varma MD   predniSONE (DELTASONE) 10 MG tablet TAKE 1 TABLET AS NEEDED (EOSINOPHILIC GASATRITIS) 20/7/78  Yes Re Varma MD   spironolactone (ALDACTONE) 25 MG tablet TAKE 1 TABLET TWICE A DAY 9/15/22  Yes SOLEDAD Aranda   blood glucose test strips (FREESTYLE LITE) strip USE TO TEST BLOOD SUGAR FOUR TIMES A DAY 9/15/22  Yes Iliana López MD   vitamin D (ERGOCALCIFEROL) 1.25 MG (77437 UT) CAPS capsule TAKE ONE CAPSULE BY MOUTH ONCE WEEKLY 9/14/22  Yes SOLEDAD Aranda   torsemide (DEMADEX) 20 MG tablet TAKE 2 TABLETS TWICE A DAY 9/14/22  Yes Re Varma MD   warfarin (COUMADIN) 5 MG tablet TAKE 1 TABLET DAILY 9/14/22  Yes Mohini Solorio MD   potassium chloride (KLOR-CON M) 10 MEQ extended release tablet TAKE 1 TABLET DAILY 8/31/22  Yes Mohini Soolrio MD   metoprolol succinate (TOPROL XL) 50 MG extended release tablet TAKE 1 TABLET DAILY 8/31/22  Yes Mohini Solorio MD   montelukast (SINGULAIR) 10 MG tablet TAKE 1 TABLET NIGHTLY 8/31/22  Yes Mohini Solorio MD   insulin lispro, 1 Unit Dial, (HUMALOG KWIKPEN) 100 UNIT/ML SOPN USE SLIDING SCALE, 140-199, 2 UNITS, 200-249, 3 UNITS, 250-300, 4 UNITS, GREATER THAN 300, INJECT 5 UNITS 8/15/22  Yes Mohini Solorio MD   levothyroxine (SYNTHROID) 100 MCG tablet TAKE 1 TABLET DAILY 8/12/22  Yes SOLEDAD Calderon CNP   calcitRIOL (ROCALTROL) 0.25 MCG capsule Take 0.25 mcg by mouth daily  5/4/22  Yes Historical Provider, MD   metOLazone (ZAROXOLYN) 2.5 MG tablet TAKE 1 TABLET ON TUESDAY AND FRIDAY AND AS DIRECTED 5/3/22  Yes SOLEDAD Schuler   amiodarone (CORDARONE) 200 MG tablet TAKE 1 TABLET DAILY  Patient taking differently: Take 100 mg by mouth daily 3/10/22  Yes Eladia Wray MD   insulin glargine (LANTUS SOLOSTAR) 100 UNIT/ML injection pen INJECT 20 UNITS IN THE MORNING  Patient taking differently: 22 Units INJECT 20 UNITS IN THE MORNING 11/22/21  Yes Mohini Solorio MD   albuterol sulfate  (90 Base) MCG/ACT inhaler USE 2 INHALATIONS EVERY 6 HOURS AS NEEDED FOR WHEEZING 11/19/21  Yes Mohini Solorio MD   ipratropium-albuterol (DUONEB) 0.5-2.5 (3) MG/3ML SOLN nebulizer solution Inhale 3 mLs into the lungs every 4 hours as needed for Shortness of Breath 11/15/21  Yes Mohini Solorio MD   midodrine (PROAMATINE) 2.5 MG tablet TAKE ONE TABLET BY MOUTH TWICE A DAY  Patient taking differently: as needed 9/16/21  Yes Violeta Ro Anam, APRN - CNS   FreeStyle Lancets MISC 1 each by Does not apply route 4 times daily 9/7/21  Yes Mohini Solorio MD   Blood Glucose Monitoring Suppl (FREESTYLE LITE) GAETANO 1 Device by Does not apply route 4 times daily   Yes Historical Provider, MD   Insulin Pen Needle (BD PEN NEEDLE JANNET U/F) 32G X 4 MM MISC USE 1 PEN NEEDLE FOUR TIMES A DAY 6/14/21  Yes Barney Baldwin MD   magnesium oxide (MAG-OX) 400 MG tablet Take 1 tablet by mouth daily 5/10/21  Yes Indu Terrell MD   ACETAMINOPHEN PO Take 500 mg by mouth every 6 hours as needed    Yes Historical Provider, MD   albuterol (PROVENTIL) (2.5 MG/3ML) 0.083% nebulizer solution Take 3 mLs by nebulization every 6 hours as needed for Wheezing 6/2/20  Yes Barney Baldwin MD   polyethylene glycol (GLYCOLAX) 17 GM/SCOOP powder Take 17 g by mouth daily    Yes Historical Provider, MD   omeprazole (PRILOSEC) 20 MG delayed release capsule Take 20 mg by mouth daily   Yes Historical Provider, MD   ondansetron (ZOFRAN) 4 MG tablet Take 1 tablet by mouth 3 times daily as needed for Nausea or Vomiting 3/26/20  Yes Barney Baldwin MD   aspirin 81 MG chewable tablet Take 1 tablet by mouth daily 6/7/19  Yes Barney Baldwin MD   vitamin B-12 500 MCG tablet Take 1 tablet by mouth daily 10/12/18  Yes Trenton Ascencio MD   acetaminophen-codeine (TYLENOL #3) 300-30 MG per tablet  10/26/22   Historical Provider, MD   oxyCODONE (ROXICODONE) 5 MG immediate release tablet Take 0.5 mg by mouth daily. Patient not taking: Reported on 11/8/2022    Historical Provider, MD        Allergies:  Menjdtze-famomgt-pyyrqz [fluocinolone], Ciprofloxacin, Diovan [valsartan], Flagyl [metronidazole], Metformin and related [metformin and related], Benazepril, Morphine, Saxagliptin, and Levaquin [levofloxacin]     Review of Systems:   Constitutional: there has been no unanticipated weight loss. There's been no change in energy level, sleep pattern, or activity level. Eyes: No visual changes or diplopia. No scleral icterus. ENT: No Headaches, hearing loss or vertigo. No mouth sores or sore throat.   Cardiovascular: Reviewed in HPI  Respiratory: No cough or wheezing, no sputum production. No hematemesis. Gastrointestinal: No abdominal pain, appetite loss, blood in stools. No change in bowel or bladder habits. Genitourinary: No dysuria, trouble voiding, or hematuria. Musculoskeletal:  No gait disturbance, weakness or joint complaints. Integumentary: No rash or pruritis. Neurological: No headache, diplopia, change in muscle strength, numbness or tingling. No change in gait, balance, coordination, mood, affect, memory, mentation, behavior. Psychiatric: No anxiety, no depression. Endocrine: No malaise, fatigue or temperature intolerance. No excessive thirst, fluid intake, or urination. No tremor. Hematologic/Lymphatic: No abnormal bruising or bleeding, blood clots or swollen lymph nodes. Allergic/Immunologic: No nasal congestion or hives. Physical Examination:    Vitals:    11/08/22 0829   BP: 110/70   Site: Right Upper Arm   Position: Sitting   Cuff Size: Large Adult   Pulse: 72   SpO2: 95%   Weight: 202 lb (91.6 kg)   Height: 5' 8\" (1.727 m)        Constitutional and General Appearance: Warm and dry, no apparent distress, normal coloration  HEENT:  Normocephalic, atraumatic  Respiratory:  Normal excursion and expansion without use of accessory muscles  Resp Auscultation: Normal breath sounds without dullness  Cardiovascular: The apical impulses not displaced  Heart tones are crisp and normal  JVP normal cm H2O  regular rate and rhythm  Peripheral pulses are symmetrical and full  There is no clubbing, cyanosis of the extremities.   No ankle edema  Pedal Pulses: 2+ and equal   Abdomen:   No masses or tenderness  Liver/Spleen: No Abnormalities Noted  Neurological/Psychiatric:  Alert and oriented in all spheres  Moves all extremities well  Exhibits normal gait balance and coordination  No abnormalities of mood, affect, memory, mentation, or behavior are noted    Lab Data:    CBC:   Lab Results   Component Value Date/Time    WBC 6.9 08/01/2022 08:58 AM WBC 6.7 05/24/2022 05:47 AM    WBC 5.7 05/23/2022 06:14 AM    RBC 3.91 08/01/2022 08:58 AM    RBC 3.65 05/24/2022 05:47 AM    RBC 3.54 05/23/2022 06:14 AM    HGB 11.2 08/01/2022 08:58 AM    HGB 10.4 05/24/2022 05:47 AM    HGB 10.3 05/23/2022 06:14 AM    HCT 34.5 08/01/2022 08:58 AM    HCT 32.6 05/24/2022 05:47 AM    HCT 31.6 05/23/2022 06:14 AM    MCV 88.1 08/01/2022 08:58 AM    MCV 89.4 05/24/2022 05:47 AM    MCV 89.2 05/23/2022 06:14 AM    RDW 16.5 08/01/2022 08:58 AM    RDW 18.0 05/24/2022 05:47 AM    RDW 17.8 05/23/2022 06:14 AM     08/01/2022 08:58 AM     05/24/2022 05:47 AM     05/23/2022 06:14 AM     BMP:  Lab Results   Component Value Date/Time     09/06/2022 08:49 AM     08/29/2022 10:48 AM     08/01/2022 08:58 AM    K 3.7 09/06/2022 08:49 AM    K 3.9 08/29/2022 10:48 AM    K 4.1 08/01/2022 08:58 AM    K 4.0 05/18/2022 04:23 AM    K 3.5 04/21/2021 04:41 AM    K 3.7 04/20/2021 04:28 AM     09/06/2022 08:49 AM    CL 99 08/29/2022 10:48 AM    CL 99 08/01/2022 08:58 AM    CO2 28 09/06/2022 08:49 AM    CO2 26 08/29/2022 10:48 AM    CO2 28 08/01/2022 08:58 AM    PHOS 3.6 08/01/2022 08:58 AM    PHOS 2.6 05/24/2022 05:47 AM    PHOS 2.5 05/23/2022 06:14 AM    BUN 40 09/06/2022 08:49 AM    BUN 30 08/29/2022 10:48 AM    BUN 32 08/01/2022 08:58 AM    CREATININE 1.9 09/06/2022 08:49 AM    CREATININE 1.9 08/29/2022 10:48 AM    CREATININE 1.6 08/01/2022 08:58 AM     BNP:   Lab Results   Component Value Date/Time    PROBNP 3,426 09/06/2022 08:49 AM    PROBNP 6,573 06/15/2022 10:26 AM    PROBNP 5,756 05/31/2022 09:23 AM     Cardiac Imaging:  Echo 5/6/2022  Summary   Technically difficult examination due to visualization and irregular rhythm   Normal left ventricle size. There is moderate concentric left ventricular hypertrophy. Ejection fraction is visually estimated to be 40%. Overall left ventricular systolic function appears moderately reduced.    There is akinesis of the apex walls. 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   No evidence of apical thrombus by contrast administration. A bioprosthetic mitral valve is well seated. Visually opens well. Cannot   adequately assess for dysfunction as gradients not obtained. The left atrium is moderately dilated. No pericardial effusion. JUDE Preliminary 5/4/2021 Dr Giovanni Zeng  AV - area ~ 1.5, opens adequately, not severe aortic stenosis     [facility] 5/3/2021 Dr Giovanni Zeng  Artery Findings/Result   LM Normal   LAD 50% mid, 70% mid, competitive flow distal from LIMA   Cx OM1 20% ostial to prox, superior branch mid OM1 50% ostial   RI N/A   S-D-RPL patent   L-LAD patent   RCA 50-60% distal, very heavily calcified   LVEDP 15   AV Peak to peak gradient 36mmHg, valve crossed easily with pigtail. LVG NA      Intervention:         None     Post Cath Dx:       Patent grafts     Echo 4/20/21  Summary   Overall left ventricular systolic function is severely depressed . Ejection fraction is visually estimated to be 10-15 %. E/e'= 23.2 GLS=-3.4   Moderately dilated left ventricle. There is severe diffuse hypokinesis. Indeterminate diastolic function. A bioprosthetic mitral valve is well seated with peak velocity of 1.59m/s   and a mean gradient of 3mmHg. The left atrium is moderately dilated. Mild aortic stenosis with a peak velocity of 2.5m/s and a mean pressure   gradient of 14mmHg. The aortic valve is thickened/calcified with decreased leaflet mobility   consistent with aortic stenosis. Mild to moderate tricuspid regurgitation. Estimated pulmonary artery systolic pressure is mildly to moderately   elevated at 46 mmHg assuming a right atrial pressure of 8 mmHg    11/30/2020 Dobutamine Echo   Dobutamine echocardiogram for aortic stenosis. Very technically limited exam due to atrial fibrillation.    Baseline echocardiogram shows severe left ventricular dysfunction with   anterior, lateral and apical hypokinesis with an ejection fraction of 20-25%. There is no improvement in wall motion with low or high dose dobutamine   suggestive of nonviable myocardium. Mild prosthetic aortic valve stenosis with a mean gradient of 16mmHg and   peak velocity of 2.47m/s at rest. After dobutamine stress the mean AV   gradient rises to 20mmHg with 20mcg of dobutamine consistent with mild to   moderate aortic stenosis. Prosthetic mitral valve with a mean gradient of 7mmHg        JUDE 10/21/2020  Mildly dilated left ventricular size and normal wall thickness. Global left ventricular function is severely decreased with ejection fraction estimated at 25%. Patient is in atrial fibrillation during procedure. The bioprosthetic mitral valve is well seated with a mean gradient of 5mmHg and maximum pressure gradient of 15 mmHg with heart rate of 89 bpm. Pressure half time of 82 ms. There is trivial mitral regurgitation. Left atrial enlargement. Spontaneous echo contrast seen in the left atrium. A large stump of the left atrial appendage with no thrombus noted. Patient has history of surgical ligation of the left atrial appendage. There are spontaneous echo contrast in the atrial appendage. The aortic valve is thickened/calcified with decreased leaflet mobility consistent with aortic stenosis. There is trivial aortic insufficiency. There is moderate tricuspid regurgitation. ECHO 9/15/20  Left ventricular cavity size is dilated. There is normal wall thickness with a moderately severe reduction in   systolic function. LV ejection fraction is visually estimated to be 25-30%. Indeterminate diastolic function. The right ventricle is not well visualized but appears to be normal in size with moderately reduced function. The left atrium is moderately dilated. A bioprosthetic mitral valve with a mean gradient of 7 mmHg. This may be a normal gradient for this valve but could suggest mild stenosis. Trivial mitral regurgitation.    The aortic valve is thickened/calcified with a mean gradient of 16mmHg consistent with at least mild aortic stenosis. This is likely underestimated due to low cardiac output secondary to LV dysfunction. No significant aortic valve regurgitation. Moderate tricuspid regurgitation with RVSP of 48 mmHg. JUDE 11/1/2019  Normal left ventricular cavity size and wall thickness. Global left ventricular function is moderate-to-severely decreased with ejection fraction estimated from 35% to 40%. Severe apical akinesis noted. The bioprosthetic mitral valve is well seated with a mean gradient of 2mmHg and maximum pressure gradient of 5 mmHg. There is trivial mitral regurgitation. Left atrial enlargement. Spontaneous echo contrast seen in the left atrium. Stump of the left atrial appendage noted with no thrombus. The aortic valve is thickened/calcified with decreased leaflet mobility consistent with aortic stenosis. There is trivial aortic insufficiency    Assessment:    1. Chronic systolic heart failure due to valvular disease (HCC) BB and aldosterone antagonist; no ace/arb due ckd   2. PAF (paroxysmal atrial fibrillation) (HCC) on coumadin in nsr   3. S/P CABG x 3    4. Nonrheumatic aortic valve stenosis    5. Hypotension on midodrine   6. Chronic kidney disease   7. ICD in place  8.       Painful urination    Plan:   Patient Instructions   Check blood work and urine today  I will call [facility] about magic mouthwash  Continue all current medications  Recommend flu vaccine  RTO march/april    I appreciate the opportunity of cooperating in the care of this individual.    SOLEDAD Mart CNS, CNS, 11/8/2022, 11:37 AM

## 2022-11-08 NOTE — PATIENT INSTRUCTIONS
Check blood work and urine today  I will call [facility] about magic mouthwash  Continue all current medications  Recommend flu vaccine  RTO march/april

## 2022-11-09 ENCOUNTER — OFFICE VISIT (OUTPATIENT)
Dept: FAMILY MEDICINE CLINIC | Age: 77
End: 2022-11-09
Payer: MEDICARE

## 2022-11-09 ENCOUNTER — TELEPHONE (OUTPATIENT)
Dept: CARDIOLOGY CLINIC | Age: 77
End: 2022-11-09

## 2022-11-09 DIAGNOSIS — Z23 NEED FOR INFLUENZA VACCINATION: Primary | ICD-10-CM

## 2022-11-09 LAB
ANION GAP SERPL CALCULATED.3IONS-SCNC: 14 MMOL/L (ref 3–16)
BUN BLDV-MCNC: 47 MG/DL (ref 7–20)
CALCIUM SERPL-MCNC: 8.5 MG/DL (ref 8.3–10.6)
CHLORIDE BLD-SCNC: 101 MMOL/L (ref 99–110)
CO2: 28 MMOL/L (ref 21–32)
CREAT SERPL-MCNC: 2.2 MG/DL (ref 0.6–1.2)
GFR SERPL CREATININE-BSD FRML MDRD: 23 ML/MIN/{1.73_M2}
GLUCOSE BLD-MCNC: 134 MG/DL (ref 70–99)
POTASSIUM SERPL-SCNC: 4.7 MMOL/L (ref 3.5–5.1)
PRO-BNP: 5501 PG/ML (ref 0–449)
SODIUM BLD-SCNC: 143 MMOL/L (ref 136–145)

## 2022-11-09 PROCEDURE — 99211 OFF/OP EST MAY X REQ PHY/QHP: CPT | Performed by: FAMILY MEDICINE

## 2022-11-09 PROCEDURE — 90674 CCIIV4 VAC NO PRSV 0.5 ML IM: CPT | Performed by: FAMILY MEDICINE

## 2022-11-09 PROCEDURE — G0008 ADMIN INFLUENZA VIRUS VAC: HCPCS | Performed by: FAMILY MEDICINE

## 2022-11-09 NOTE — TELEPHONE ENCOUNTER
Spoke to patient she verbalized understanding. She is asking if you were successful in calling [facility] for the magic mouthwash?   Please advise

## 2022-11-09 NOTE — TELEPHONE ENCOUNTER
----- Message from SOLEDAD Taylor - CNS sent at 11/9/2022 12:45 PM EST -----  Labs are stable  Continue current medications  thanks

## 2022-11-09 NOTE — PROGRESS NOTES
Vaccine Information Sheet, \"Influenza - Inactivated\"  given to Isiah Faye, or parent/legal guardian of  Isiah Faye and verbalized understanding. Patient responses:    Have you ever had a reaction to a flu vaccine? No  Are you able to eat eggs without adverse effects? Yes  Do you have any current illness? No  Have you ever had Guillian [place] Syndrome? No    Flu vaccine given per order. Please see immunization tab.     Immunization(s) given during visit:     Immunizations Administered       Name Date Dose Route    Influenza, FLUCELVAX, (age 10 mo+), MDCK, PF, 0.5mL 11/9/2022 0.25 mL Intramuscular    Site: Deltoid- Right    Lot: [id number]    NDC: 09782-310-96

## 2022-11-10 ENCOUNTER — ANTI-COAG VISIT (OUTPATIENT)
Dept: FAMILY MEDICINE CLINIC | Age: 77
End: 2022-11-10

## 2022-11-10 LAB — INR BLD: 3.3

## 2022-11-11 ENCOUNTER — HOSPITAL ENCOUNTER (OUTPATIENT)
Dept: PHYSICAL THERAPY | Age: 77
Setting detail: THERAPIES SERIES
Discharge: HOME | End: 2022-11-11
Payer: MEDICARE

## 2022-11-11 PROCEDURE — 97530 THERAPEUTIC ACTIVITIES: CPT

## 2022-11-11 PROCEDURE — 97110 THERAPEUTIC EXERCISES: CPT

## 2022-11-11 NOTE — FLOWSHEET NOTE
[address] [facility] Physical Therapy  Phone: [phone]   Fax: [phone]    Physical Therapy Daily Treatment Note    Date:  2022     Patient Name:  Franca oGld    :  1945  MRN: [id number]  Medical Diagnosis:  Other intervertebral disc degeneration, lumbosacral region [M51.37]  Treatment Diagnosis: weakness of LE's, decreased core stabilization, gait deviations, pain, decreased Lspine ROM, decreased hip ROM all limiting pt's functional mobility, adl's and household chores       Insurance/Certification information:   [facility], no auth needed, no hard max     Physician Information:  Milo Freeman MD    Plan of care signed (Y/N): []  Yes [x]  No     Date of Patient follow up with Physician:      Progress Report: []  Yes  [x]  No     Date Range for reporting period:  Beginnin2022  Ending:     Progress report due (10 Rx/or 30 days whichever is less): visit #10 or 49      Recertification due (POC duration/ or 90 days whichever is less): visit #20 or 23     Visit # Insurance Allowable Auth required? Date Range   2/8 [place] []  Yes  [x]  No        Units approved Units used Date Range            Latex Allergy:  [x]NO      []YES  Preferred Language for Healthcare:   [x]English       []other:    Functional Scale:           Date assessed:  FOTO physical FS primary measure score = ; risk adjusted =   Will assess    Pain level:  0/10     SUBJECTIVE: Pt reports back is still feeling better. No pain this am in back. L knee pain.        OBJECTIVE: B hip flxion 3/5 MMT      Precautions/ Contra-indications: cardiac limitations with SOB during activity, pt has a strong fear of water - no aquatics  Latex Allergy:  [x]NO      []YES    Exercises/Interventions:     Therapeutic Exercises (75249) Resistance / level Sets/sec Reps Notes   stepper     not done this date due to both in use already; resume next visit   Seated: LAQ  Marching  Hip add squeeze with ball Hip abd Lime  2#    lime 2  2  5sec  2 10x  10x  10x  10x 11/11 increased sets; added resistance to LAQ and added weights to marching   Standing calf stretch  3 30\" 11/7   Standing HS stretch 4\" step  3 30\" 11/7     resume next visit   Supine bridges  1 10 11/7   Sit to stand from EOB 24 inches 2 5 11/7                 Therapeutic Activities (80158)              Standing in //bars:  Marching  Hip abd    1  1   10x  10x Cannot do mini squats due to L knee pain;  new 11/11 with cues and PT demo for correct tech                 Gait (44090)                                   Neuromuscular Re-ed (78920)                                                 Manual Intervention (50086)                                                     Modalities: none this date    Pt. Education:  11/11/2022  - educated on improving core strength and LE strength for improved function    Home Exercise Program:  Seated: LAQ  Marching  Hip add squeeze with ball   Hip abd      Therapeutic Exercise and NMR EXR  [x] (18333) Provided verbal/tactile cueing for activities related to strengthening, flexibility, endurance, ROM for improvements in  [x] LE / Lumbar: LE, proximal hip, and core control with self care, mobility, lifting, ambulation. [] UE / Cervical: cervical, postural, scapular, scapulothoracic and UE control with self care, reaching, carrying, lifting, house/yardwork, driving, computer work. [x] (53583) Provided verbal/tactile cueing for activities related to improving balance, coordination, kinesthetic sense, posture, motor skill, proprioception to assist with   [x] LE / lumbar: LE, proximal hip, and core control in self care, mobility, lifting, ambulation and eccentric single leg control.    [] UE / cervical: cervical, scapular, scapulothoracic and UE control with self care, reaching, carrying, lifting, house/yardwork, driving, computer work.   [] (12262) Therapist is in constant attendance of 2 or more patients providing skilled therapy interventions, but not providing any significant amount of measurable one-on-one time to either patient, for improvements in  [] LE / lumbar: LE, proximal hip, and core control in self care, mobility, lifting, ambulation and eccentric single leg control. [] UE / cervical: cervical, scapular, scapulothoracic and UE control with self care, reaching, carrying, lifting, house/yardwork, driving, computer work. NMR and Therapeutic Activities:    [] (34459 or 66492) Provided verbal/tactile cueing for activities related to improving balance, coordination, kinesthetic sense, posture, motor skill, proprioception and motor activation to allow for proper function of   [] LE: / Lumbar core, proximal hip and LE with self care and ADLs  [] UE / Cervical: cervical, postural, scapular, scapulothoracic and UE control with self care, carrying, lifting, driving, computer work.   [] (60179) Gait Re-education- Provided training and instruction to the patient for proper LE, core and proximal hip recruitment and positioning and eccentric body weight control with ambulation re-education including up and down stairs     Home Management Training / Self Care:  [] (82391) Provided self-care/home management training related to activities of daily living and compensatory training, and/or use of adaptive equipment for improvement with: ADLs and compensatory training, meal preparation, safety procedures and instruction in use of adaptive equipment, including bathing, grooming, dressing, personal hygiene, basic household cleaning and chores.      Home Exercise Program:    [x] (09574) Reviewed/Progressed HEP activities related to strengthening, flexibility, endurance, ROM of   [x] LE / Lumbar: core, proximal hip and LE for functional self-care, mobility, lifting and ambulation/stair navigation   [] UE / Cervical: cervical, postural, scapular, scapulothoracic and UE control with self care, reaching, carrying, lifting, house/yardwork, driving, computer work  [] (48410)Reviewed/Progressed HEP activities related to improving balance, coordination, kinesthetic sense, posture, motor skill, proprioception of   [] LE: core, proximal hip and LE for self care, mobility, lifting, and ambulation/stair navigation    [] UE / Cervical: cervical, postural,  scapular, scapulothoracic and UE control with self care, reaching, carrying, lifting, house/yardwork, driving, computer work    Manual Treatments:  PROM / STM / Oscillations-Mobs:  G-I, II, III, IV (PA's, Inf., Post.)  [] ([id number]) Provided manual therapy to mobilize LE, proximal hip and/or LS spine soft tissue/joints for the purpose of modulating pain, promoting relaxation,  increasing ROM, reducing/eliminating soft tissue swelling/inflammation/restriction, improving soft tissue extensibility and allowing for proper ROM for normal function with   [] LE / lumbar: self care, mobility, lifting and ambulation. [] UE / Cervical: self care, reaching, carrying, lifting, house/yardwork, driving, computer work. Modalities:  [] (97667) Vasopneumatic compression: Utilized vasopneumatic compression to decrease edema / swelling for the purpose of improving mobility and quad tone / recruitment which will allow for increased overall function including but not limited to self-care, transfers, ambulation, and ascending / descending stairs.        Charges:  Timed Code Treatment Minutes: 40   Total Treatment Minutes: 40     [] EVAL - LOW (67529)   [] EVAL - MOD (01436)  [] EVAL - HIGH (45085)  [] RE-EVAL (41702)  [x] JJ(23666) x   2    [] Ionto  [] NMR (97097) x       [] Vaso  [] Manual (14745) x       [] Ultrasound  [x] TA x  1      [] Mech Traction (31543)  [] Aquatic Therapy x     [] ES (un) (51513):   [] Home Management Training x  [] ES(attended) (64089)   [] Dry Needling 1-2 muscles (65644):  [] Dry Needling 3+ muscles (478399)  [] Group:      [] Other:     GOALS:  Patient stated goal: to feel better  [] Progressing: [] Met: [] Not Met: [] Adjusted     Therapist goals for Patient:   Short Term Goals: To be achieved in: 2 weeks  1. Independent in HEP and progression per patient tolerance, in order to prevent re-injury. [] Progressing: [] Met: [] Not Met: [] Adjusted  2. Patient will have a decrease in pain to facilitate improvement in movement, function, and ADLs as indicated by Functional Deficits. [] Progressing: [] Met: [] Not Met: [] Adjusted     Long Term Goals: To be achieved in: 4-6 weeks  1. FOTO score of at least 60 to assist with reaching prior level of function. [] Progressing: [] Met: [] Not Met: [] Adjusted  2. Patient will demonstrate increased AROM LS mobility: flex 0-65, B SB 0-12, B Rot 0-25, ext 0-15, good hip ROM to allow for proper joint functioning as indicated by patients Functional Deficits. [] Progressing: [] Met: [] Not Met: [] Adjusted  3. Patient will demonstrate an increase in Strength to 4/5 B hip flexion and core activation to allow for proper functional mobility as indicated by patients Functional Deficits. [] Progressing: [] Met: [] Not Met: [] Adjusted  4. Patient will return to functional activities including cooking x15min without increased symptoms or restriction. [] Progressing: [] Met: [] Not Met: [] Adjusted  5. Pt will dress lower body without  assisting pt without pain or difficulty. [] Progressing: [] Met: [] Not Met: [] Adjusted            Overall Progression Towards Functional goals/ Treatment Progress Update:  [] Patient is progressing as expected towards functional goals listed. [] Progression is slowed due to complexities/Impairments listed. [] Progression has been slowed due to co-morbidities.   [x] Plan just implemented, too soon to assess goals progression <30days   [] Goals require adjustment due to lack of progress  [] Patient is not progressing as expected and requires additional follow up with physician  [] Other    Persisting Functional Limitations/Impairments:  []Sleeping []Sitting               [x]Standing []Transfers        [x]Walking []Kneeling               [x]Stairs [x]Squatting / bending   [x]ADLs []Reaching  [x]Lifting  [x]Housework  [x]Driving []Job related tasks  [x]Sports/Recreation []Other:        ASSESSMENT:  Upgraded exercises as noted above on flow sheet for improved core strength. Pt cont to report L knee pain - states it is bone on bone - knee pain limits activity tolerance and pt's ability to do some ex for LB. Pt benefits from LE stretches performed this date as well as difficulty maintaining good abdominal bracing with exercises due to core muscle weakness. Continue to upgrade exercises as tolerated to improve strength, stability and mobility with progression toward goals. Treatment/Activity Tolerance:  [] Patient able to complete tx [] Patient limited by fatigue  [x] Patient limited by pain  [] Patient limited by other medical complications  [] Other:     Prognosis: [x] Good [] Fair  [] Poor    Patient Requires Follow-up: [x] Yes  [] No    PLAN: Begin PT focusing on: proximal hip mobilizations, LB mobs, LB core activation, proximal hip activation, and HEP     Frequency/Duration:  2 days per week for 4-6 Weeks:  Interventions:  [x]  Therapeutic exercise including: strength training, ROM, for LE, Glutes and core   [x]  NMR activation and proprioception for glutes , LE and Core   [x]  Manual therapy as indicated for Hip complex, LE and spine to include: Dry Needling/IASTM, STM, PROM, Gr I-IV mobilizations, manipulation. [x]  Modalities as needed that may include: thermal agents, E-stim, Biofeedback, US, iontophoresis as indicated  [x]  Patient education on joint protection, postural re-education, activity modification, progression of HEP.      [] Continue per plan of care [] Alter current plan (see comments)  [x] Plan of care initiated [] Hold pending MD visit [] Discharge    Electronically signed by: Beatriz Montemayor PATSY, PT , MPT [id number]      Note: If patient does not return for scheduled/ recommended follow up visits, this note will serve as a discharge from care along with most recent update on progress.

## 2022-11-14 ENCOUNTER — HOSPITAL ENCOUNTER (OUTPATIENT)
Dept: PHYSICAL THERAPY | Age: 77
Setting detail: THERAPIES SERIES
Discharge: HOME | End: 2022-11-14
Payer: MEDICARE

## 2022-11-14 NOTE — FLOWSHEET NOTE
[address]     Physical Therapy  Cancellation/No-show Note  Patient Name:  Lynn Keith  :  1945   Date:  2022  Cancelled visits to date: 1  No-shows to date: 0    Patient status for today's appointment patient:  [x]  Cancelled  []  Rescheduled appointment  []  No-show     Reason given by patient:  []  Patient ill  []  Conflicting appointment  []  No transportation    []  Conflict with work  []  No reason given  []  Other:  states he back is too sore   Comments:      Phone call information:   []  Phone call made today to patient at _ time at number provided:      []  Patient answered, conversation as follows:    []  Patient did not answer, message left as follows:  []  Phone call not made today  [x]  Phone call not needed - pt contacted us to cancel and provided reason for cancellation. Electronically signed by:  Kelly Wood PTA  I have read this note and will call pt later today to check on her. 11/15/22.   Thank you,  Lana Berumen, MPT [id number]

## 2022-11-16 ENCOUNTER — OFFICE VISIT (OUTPATIENT)
Dept: FAMILY MEDICINE CLINIC | Age: 77
End: 2022-11-16
Payer: MEDICARE

## 2022-11-16 VITALS
OXYGEN SATURATION: 94 % | WEIGHT: 194.6 LBS | SYSTOLIC BLOOD PRESSURE: 86 MMHG | TEMPERATURE: 97 F | BODY MASS INDEX: 29.59 KG/M2 | DIASTOLIC BLOOD PRESSURE: 64 MMHG | HEART RATE: 120 BPM

## 2022-11-16 DIAGNOSIS — M15.9 GENERALIZED OSTEOARTHROSIS, INVOLVING MULTIPLE SITES: ICD-10-CM

## 2022-11-16 DIAGNOSIS — E11.22 TYPE 2 DIABETES MELLITUS WITH STAGE 3B CHRONIC KIDNEY DISEASE, WITH LONG-TERM CURRENT USE OF INSULIN (HCC): ICD-10-CM

## 2022-11-16 DIAGNOSIS — Z79.4 TYPE 2 DIABETES MELLITUS WITH STAGE 3B CHRONIC KIDNEY DISEASE, WITH LONG-TERM CURRENT USE OF INSULIN (HCC): ICD-10-CM

## 2022-11-16 DIAGNOSIS — N18.32 TYPE 2 DIABETES MELLITUS WITH STAGE 3B CHRONIC KIDNEY DISEASE, WITH LONG-TERM CURRENT USE OF INSULIN (HCC): ICD-10-CM

## 2022-11-16 DIAGNOSIS — M54.6 PAIN IN THORACIC SPINE AT MULTIPLE SITES: Primary | ICD-10-CM

## 2022-11-16 PROBLEM — I50.23 ACUTE ON CHRONIC SYSTOLIC CHF (CONGESTIVE HEART FAILURE) (HCC): Status: RESOLVED | Noted: 2021-04-26 | Resolved: 2022-11-16

## 2022-11-16 PROCEDURE — 3044F HG A1C LEVEL LT 7.0%: CPT | Performed by: FAMILY MEDICINE

## 2022-11-16 PROCEDURE — 3074F SYST BP LT 130 MM HG: CPT | Performed by: FAMILY MEDICINE

## 2022-11-16 PROCEDURE — 3078F DIAST BP <80 MM HG: CPT | Performed by: FAMILY MEDICINE

## 2022-11-16 PROCEDURE — 1123F ACP DISCUSS/DSCN MKR DOCD: CPT | Performed by: FAMILY MEDICINE

## 2022-11-16 PROCEDURE — 99214 OFFICE O/P EST MOD 30 MIN: CPT | Performed by: FAMILY MEDICINE

## 2022-11-16 RX ORDER — ACETAMINOPHEN AND CODEINE PHOSPHATE 300; 30 MG/1; MG/1
1 TABLET ORAL EVERY 6 HOURS PRN
Qty: 120 TABLET | Refills: 0 | Status: SHIPPED | OUTPATIENT
Start: 2022-11-16 | End: 2022-12-16

## 2022-11-16 RX ORDER — ERGOCALCIFEROL 1.25 MG/1
CAPSULE ORAL
Qty: 12 CAPSULE | Refills: 1 | Status: SHIPPED | OUTPATIENT
Start: 2022-11-16

## 2022-11-16 RX ORDER — CALCITRIOL 0.25 UG/1
CAPSULE, LIQUID FILLED ORAL
Qty: 30 CAPSULE | Refills: 3 | Status: SHIPPED | OUTPATIENT
Start: 2022-11-16

## 2022-11-16 RX ORDER — MIDODRINE HYDROCHLORIDE 2.5 MG/1
2.5 TABLET ORAL 3 TIMES DAILY
Qty: 90 TABLET | Refills: 2 | Status: SHIPPED | OUTPATIENT
Start: 2022-11-16

## 2022-11-16 RX ORDER — LEVOTHYROXINE SODIUM 0.1 MG/1
TABLET ORAL
Qty: 90 TABLET | Refills: 3 | Status: SHIPPED | OUTPATIENT
Start: 2022-11-16

## 2022-11-16 RX ORDER — LEVOTHYROXINE SODIUM 0.1 MG/1
TABLET ORAL
Qty: 90 TABLET | Refills: 3 | OUTPATIENT
Start: 2022-11-16

## 2022-11-16 NOTE — PROGRESS NOTES
Subjective:      Patient ID: Vinicius Meade is a 77 y.o. female. CC: Patient presents for re-evaluation of chronic health problems including severe back pain, diabetes mellitus or chronic kidney disease and osteoarthritis. Aura Gibbs HPIPatient presents today for a follow-up on chronic medications and medical conditions in accompaniment of . Patient states her back pain has been getting worse. She been having back issues for the last several months. She is was taking oxycodone up to 4 times a day and then was evaluated by pain management specialist.  She was taken off the oxycodone and placed on Tylenol with codeine twice daily. When he went back for reevaluation they did not see the physician but saw the nurse practitioner. The nurse practitioner decided since she was pain-free at that time to discontinue medications entirely. But after that then her pain became quite unbearable. She has discomfort from the mid to lower thoracic area bilaterally. Her pain is worse with activities but is always there. She does get relief if her  rubs her back regularly. She knows she is lost weight because she simply has no appetite because of discomfort. She denies any GI upset. Her blood pressure is low today but normally she does not require any medication for low blood pressure. She feels her breathing is stable. Eye exam current (within one year): Yes    Checks sugars at home: yes  Home blood sugar records: patient tests 4 time(s) per day  Any episodes of hypoglycemia?  Yes    Current medication use: taking as prescribed  Medication side effects: none     Current diet:  her  states she has not been eating much due to her back pain  Current exercise:not active        Review of Systems      Patient Active Problem List   Diagnosis    Long term current use of anticoagulant    Hypercholesteremia    Generalized osteoarthrosis, involving multiple sites    Eosinophilic gastritis    Paroxysmal SVT (supraventricular tachycardia) ([facility])    B12 deficiency    History of pulmonary embolism    Multiple pulmonary nodules    Asthma    PAF (paroxysmal atrial fibrillation) ([facility])    Hypertension    Coronary artery disease due to calcified coronary lesion    Nonrheumatic mitral valve regurgitation    Goiter    Primary osteoarthritis of both knees    Splenic infarct    Obesity, Class I, BMI 30-34.9    Chronic systolic CHF (congestive heart failure), NYHA class 3 ([facility])    Thoracic degenerative disc disease    Persistent atrial fibrillation (H[facility])    S/P MVR (mitral valve repair)    Ischemic cardiomyopathy    Occlusion and stenosis of bilateral carotid arteries    Pneumatosis intestinalis    Aortic valve stenosis    Deep vein thrombosis (DVT) of non-extremity vein    Acute on chronic systolic heart failure due to valvular disease ([facility])    Stage 4 chronic kidney disease ([facility])    Acute kidney injury superimposed on CKD (HCC)    Acute on chronic systolic CHF (congestive heart failure) ([facility])    Chronic diastolic heart failure ([facility])    Atherosclerosis of superior mesenteric artery ([address]    ICD (implantable cardioverter-defibrillator) in place    Type 2 diabetes mellitus with stage 3b chronic kidney disease, with long-term current use of insulin ([address]    Acquired hypothyroidism    Hypercoagulable state, secondary ([address]    Wheezing       Outpatient Medications Marked as Taking for the 11/16/22 encounter (Office Visit) with aKmla Ward MD   Medication Sig Dispense Refill    acetaminophen-codeine (TYLENOL #3) 300-30 MG per tablet       lidocaine 4 % external patch Place 1 patch onto the skin daily 1 each 0    nystatin (MYCOSTATIN) 701320 UNIT/ML suspension TAKE ONE TEASPOONFUL BY MOUTH FOUR TIMES A DAY FOR 5 DAYS 120 mL 1    predniSONE (DELTASONE) 10 MG tablet TAKE 1 TABLET AS NEEDED (EOSINOPHILIC GASATRITIS) 219 tablet 1    spironolactone (ALDACTONE) 25 MG tablet TAKE 1 TABLET TWICE A  tablet 1    blood glucose test strips (FREESTYLE LITE) strip USE TO TEST BLOOD SUGAR FOUR TIMES A  strip 3    vitamin D (ERGOCALCIFEROL) 1.25 MG (78836 UT) CAPS capsule TAKE ONE CAPSULE BY MOUTH ONCE WEEKLY 12 capsule 1    torsemide (DEMADEX) 20 MG tablet TAKE 2 TABLETS TWICE A  tablet 3    warfarin (COUMADIN) 5 MG tablet TAKE 1 TABLET DAILY 100 tablet 3    potassium chloride (KLOR-CON M) 10 MEQ extended release tablet TAKE 1 TABLET DAILY 90 tablet 1    metoprolol succinate (TOPROL XL) 50 MG extended release tablet TAKE 1 TABLET DAILY 90 tablet 1    montelukast (SINGULAIR) 10 MG tablet TAKE 1 TABLET NIGHTLY 90 tablet 1    insulin lispro, 1 Unit Dial, (HUMALOG KWIKPEN) 100 UNIT/ML SOPN USE SLIDING SCALE, 140-199, 2 UNITS, 200-249, 3 UNITS, 250-300, 4 UNITS, GREATER THAN 300, INJECT 5 UNITS 15 mL 2    levothyroxine (SYNTHROID) 100 MCG tablet TAKE 1 TABLET DAILY 90 tablet 0    calcitRIOL (ROCALTROL) 0.25 MCG capsule Take 0.25 mcg by mouth daily       metOLazone (ZAROXOLYN) 2.5 MG tablet TAKE 1 TABLET ON TUESDAY AND FRIDAY AND AS DIRECTED 30 tablet 1    amiodarone (CORDARONE) 200 MG tablet TAKE 1 TABLET DAILY (Patient taking differently: Take 100 mg by mouth daily) 60 tablet 5    insulin glargine (LANTUS SOLOSTAR) 100 UNIT/ML injection pen INJECT 20 UNITS IN THE MORNING (Patient taking differently: 22 Units INJECT 20 UNITS IN THE MORNING) 90 mL 1    albuterol sulfate  (90 Base) MCG/ACT inhaler USE 2 INHALATIONS EVERY 6 HOURS AS NEEDED FOR WHEEZING 25.5 g 2    ipratropium-albuterol (DUONEB) 0.5-2.5 (3) MG/3ML SOLN nebulizer solution Inhale 3 mLs into the lungs every 4 hours as needed for Shortness of Breath 120 each 0    midodrine (PROAMATINE) 2.5 MG tablet TAKE ONE TABLET BY MOUTH TWICE A DAY (Patient taking differently: as needed) 60 tablet 5    FreeStyle Lancets MISC 1 each by Does not apply route 4 times daily 200 each 5    Blood Glucose Monitoring Suppl (FREESTYLE LITE) GAETANO 1 Device by Does not apply route 4 times daily oxyCODONE (ROXICODONE) 5 MG immediate release tablet Take 0.5 mg by mouth daily. Insulin Pen Needle (BD PEN NEEDLE JANNET U/F) 32G X 4 MM MISC USE 1 PEN NEEDLE FOUR TIMES A  each 3    magnesium oxide (MAG-OX) 400 MG tablet Take 1 tablet by mouth daily 90 tablet 3    ACETAMINOPHEN PO Take 500 mg by mouth every 6 hours as needed       albuterol (PROVENTIL) (2.5 MG/3ML) 0.083% nebulizer solution Take 3 mLs by nebulization every 6 hours as needed for Wheezing 120 each 3    polyethylene glycol (GLYCOLAX) 17 GM/SCOOP powder Take 17 g by mouth daily       omeprazole (PRILOSEC) 20 MG delayed release capsule Take 20 mg by mouth daily      ondansetron (ZOFRAN) 4 MG tablet Take 1 tablet by mouth 3 times daily as needed for Nausea or Vomiting 30 tablet 0    aspirin 81 MG chewable tablet Take 1 tablet by mouth daily 30 tablet 3    vitamin B-12 500 MCG tablet Take 1 tablet by mouth daily 30 tablet 3       Allergies   Allergen Reactions    Lfxjhmqu-Wysjfik-Ctnpom [Fluocinolone] Shortness Of Breath    Ciprofloxacin Shortness Of Breath    Diovan [Valsartan] Shortness Of Breath    Flagyl [Metronidazole] Shortness Of Breath     Has taken diflucan at home 12/7/15    Metformin And Related [Metformin And Related] Shortness Of Breath    Benazepril      Other reaction(s): Not Recorded    Morphine      Bad reaction. \"makes her feel horrible\". Saxagliptin      Other reaction(s): Not Recorded    Levaquin [Levofloxacin] Rash       Social History     Tobacco Use    Smoking status: Never    Smokeless tobacco: Never   Substance Use Topics    Alcohol use: No       BP 86/64 (Site: Right Upper Arm, Position: Sitting, Cuff Size: Large Adult)   Pulse (!) 120   Temp 97 °F (36.1 °C) (Infrared)   Wt 194 lb 9.6 oz (88.3 kg) Comment: pt reported/ taken at home  SpO2 94%   BMI 29.59 kg/m²         Objective:   Physical Exam  Constitutional:       General: She is not in acute distress. Appearance: She is well-developed.    Neck: Vascular: No carotid bruit. Cardiovascular:      Rate and Rhythm: Normal rate and regular rhythm. Pulses:           Dorsalis pedis pulses are 2+ on the right side and 2+ on the left side. Posterior tibial pulses are 2+ on the right side and 2+ on the left side. Heart sounds: Normal heart sounds. No murmur heard. No friction rub. No gallop. Pulmonary:      Effort: Pulmonary effort is normal.      Breath sounds: Normal breath sounds. Musculoskeletal:      Thoracic back: Tenderness (Tenderness on spinous process at approximately T5 and then another area about T8-9.) present. No deformity, spasms or bony tenderness. Decreased range of motion. Lumbar back: No spasms. Normal range of motion. Right lower leg: No edema. Left lower leg: No edema. Neurological:      Mental Status: She is alert and oriented to person, place, and time. Cranial Nerves: No cranial nerve deficit. Sensory: Sensation is intact. Motor: Motor function is intact. Psychiatric:         Behavior: Behavior is cooperative. Assessment:      Pérez Heath was seen today for follow-up and diabetes. Diagnoses and all orders for this visit:    Pain in thoracic spine at multiple sites  -     acetaminophen-codeine (TYLENOL #3) 300-30 MG per tablet; Take 1 tablet by mouth every 6 hours as needed for Pain for up to 30 days. -     XR THORACIC SPINE (2 VIEWS); Future    Type 2 diabetes mellitus with stage 3b chronic kidney disease, with long-term current use of insulin ([facility])  -      DIABETES FOOT EXAM    Generalized osteoarthrosis, involving multiple sites    Other orders  -     levothyroxine (SYNTHROID) 100 MCG tablet; TAKE 1 TABLET DAILY  -     midodrine (PROAMATINE) 2.5 MG tablet;  Take 1 tablet by mouth 3 times daily  -     vitamin D (ERGOCALCIFEROL) 1.25 MG (65239 UT) CAPS capsule; TAKE ONE CAPSULE BY MOUTH ONCE WEEKLY  -     calcitRIOL (ROCALTROL) 0.25 MCG capsule; 1 tablet Monday, Wednesday and Friday    OARRS report checked        Plan:      Recent laboratory profile reviewed but unfortunately hemoglobin A1c was not performed. Patient appears very uncomfortable with the thoracic back pain. She is a high risk for fractures with intermittent steroid use each and chronic health issues. Since she did much better with the Tylenol with codeine restart the Tylenol with codeine medication and pursue back x-ray. We discussed that her back x-ray did not demonstrate any abnormality we would need a CT scan of her back as she needs not able to do an MRI scan because of underlying medical issues. Keep RTC appointment        Please note that this chart was generated using Dragon dictation software. Although every effort was made to ensure the accuracy of this automated transcription, some errors in transcription may have occurred.

## 2022-11-16 NOTE — TELEPHONE ENCOUNTER
Medication:   Requested Prescriptions     Pending Prescriptions Disp Refills    levothyroxine (SYNTHROID) 100 MCG tablet [Pharmacy Med Name: L-THYROXINE (SYNTHROID) TABS 100MCG] 90 tablet 3     Sig: TAKE 1 TABLET DAILY      Last Filled:      Patient Phone Number: [phone] (home)     Last appt: 11/9/2022   Next appt: 11/16/2022    Last OARRS:   RX Monitoring 3/1/2019   Attestation The Prescription Monitoring Report for this patient was reviewed today.    Periodic Controlled Substance Monitoring -     PDMP Monitoring:    Last PDMP Padma Hargrove as Reviewed [facility]):  Review User Review Instant Review Result   Fredrick Escalona 9/20/2022  1:03 PM Reviewed PDMP [1]     Preferred Pharmacy:   [facility] , [address] [phone] - F [phone] [address] [address]  Phone: [phone] Fax: [phone]

## 2022-11-17 ENCOUNTER — HOSPITAL ENCOUNTER (OUTPATIENT)
Dept: GENERAL RADIOLOGY | Age: 77
Discharge: HOME | End: 2022-11-17
Payer: MEDICARE

## 2022-11-17 ENCOUNTER — TELEPHONE (OUTPATIENT)
Dept: CARDIOLOGY CLINIC | Age: 77
End: 2022-11-17

## 2022-11-17 ENCOUNTER — HOSPITAL ENCOUNTER (OUTPATIENT)
Age: 77
Discharge: HOME | End: 2022-11-17
Payer: MEDICARE

## 2022-11-17 DIAGNOSIS — M54.6 PAIN IN THORACIC SPINE AT MULTIPLE SITES: ICD-10-CM

## 2022-11-17 PROCEDURE — 72070 X-RAY EXAM THORAC SPINE 2VWS: CPT

## 2022-11-17 NOTE — TELEPHONE ENCOUNTER
dropped off a recordings of her BP   88//84  These are great  Ask him not to give the midodrine if BP>120

## 2022-11-18 ENCOUNTER — TELEPHONE (OUTPATIENT)
Dept: FAMILY MEDICINE CLINIC | Age: 77
End: 2022-11-18

## 2022-11-18 ENCOUNTER — HOSPITAL ENCOUNTER (OUTPATIENT)
Dept: PHYSICAL THERAPY | Age: 77
Setting detail: THERAPIES SERIES
End: 2022-11-18
Payer: MEDICARE

## 2022-11-18 ENCOUNTER — ANTI-COAG VISIT (OUTPATIENT)
Dept: FAMILY MEDICINE CLINIC | Age: 77
End: 2022-11-18

## 2022-11-18 DIAGNOSIS — S22.009A CLOSED FRACTURE OF THORACIC VERTEBRA, UNSPECIFIED FRACTURE MORPHOLOGY, UNSPECIFIED THORACIC VERTEBRAL LEVEL, INITIAL ENCOUNTER (HCC): Primary | ICD-10-CM

## 2022-11-18 LAB — INR BLD: 2.7

## 2022-11-18 NOTE — TELEPHONE ENCOUNTER
----- Message from Re Varma MD sent at 11/18/2022  7:15 AM EST -----  X-rays demonstrate multiple fractures but uncertain whether these are new.   Would recommend CT scan of the thoracic spine with a diagnosis of thoracic fractures

## 2022-11-18 NOTE — TELEPHONE ENCOUNTER
PT's  called explaining that he got an e-mail saying that we have rejected the: levothyroxine (SYNTHROID) 100 MCG tablet [[id number]]     Order Details  Dose, Route, Frequency: As Directed   Dispense Quantity: 90 tablet         [phone]

## 2022-11-18 NOTE — TELEPHONE ENCOUNTER
Called pt and advise her that medication was sen to her mail order pharmacy on 11/16/22 for a full year refill.  Pharmacy confirm on 11/16/22 @ 11:06 am that they received

## 2022-11-22 ENCOUNTER — APPOINTMENT (OUTPATIENT)
Dept: PHYSICAL THERAPY | Age: 77
End: 2022-11-22
Payer: MEDICARE

## 2022-11-25 ENCOUNTER — HOSPITAL ENCOUNTER (OUTPATIENT)
Dept: CT IMAGING | Age: 77
Discharge: HOME | End: 2022-11-25
Payer: MEDICARE

## 2022-11-25 ENCOUNTER — APPOINTMENT (OUTPATIENT)
Dept: PHYSICAL THERAPY | Age: 77
End: 2022-11-25
Payer: MEDICARE

## 2022-11-25 DIAGNOSIS — S22.009A CLOSED FRACTURE OF THORACIC VERTEBRA, UNSPECIFIED FRACTURE MORPHOLOGY, UNSPECIFIED THORACIC VERTEBRAL LEVEL, INITIAL ENCOUNTER (HCC): ICD-10-CM

## 2022-11-25 PROCEDURE — 72128 CT CHEST SPINE W/O DYE: CPT

## 2022-11-29 ENCOUNTER — TELEPHONE (OUTPATIENT)
Dept: FAMILY MEDICINE CLINIC | Age: 77
End: 2022-11-29

## 2022-11-29 ENCOUNTER — APPOINTMENT (OUTPATIENT)
Dept: PHYSICAL THERAPY | Age: 77
End: 2022-11-29
Payer: MEDICARE

## 2022-11-29 ENCOUNTER — ANTI-COAG VISIT (OUTPATIENT)
Dept: FAMILY MEDICINE CLINIC | Age: 77
End: 2022-11-29

## 2022-11-29 DIAGNOSIS — S22.050A COMPRESSION FRACTURE OF T5 VERTEBRA, INITIAL ENCOUNTER (HCC): Primary | ICD-10-CM

## 2022-11-29 DIAGNOSIS — M80.08XA AGE-RELATED OSTEOPOROSIS WITH CURRENT PATHOLOGICAL FRACTURE, VERTEBRA(E), INITIAL ENCOUNTER FOR FRACTURE (HCC): ICD-10-CM

## 2022-11-29 LAB — INR BLD: 2.4

## 2022-11-29 NOTE — TELEPHONE ENCOUNTER
Patient advised that the IR radiology department will call her to schedule once they check with the physician there and see if she qualifies for this.

## 2022-11-29 NOTE — TELEPHONE ENCOUNTER
----- Message from Fam Albert MD sent at 11/28/2022  8:09 AM EST -----  CT scan demonstrates progressive fracture at the T5 level. Does patient think she can undergo an MRI scan of the thoracic spine?   If she cannot lets proceed with an MRI scan of thoracic spine to see if this is an acute fracture

## 2022-12-02 ENCOUNTER — TELEPHONE (OUTPATIENT)
Dept: FAMILY MEDICINE CLINIC | Age: 77
End: 2022-12-02

## 2022-12-02 ENCOUNTER — TELEPHONE (OUTPATIENT)
Dept: INTERVENTIONAL RADIOLOGY/VASCULAR | Age: 77
End: 2022-12-02

## 2022-12-02 NOTE — TELEPHONE ENCOUNTER
Patient should be off Coumadin 5 days prior to the procedure.   She should maintain her other medications with no changes

## 2022-12-02 NOTE — TELEPHONE ENCOUNTER
----- Message from Latanya Pederson sent at 12/2/2022  9:29 AM EST -----  Subject: Message to Provider    QUESTIONS  Information for Provider? Patient was told by Dr. Ignacia Saha that she was   supposed to be getting a call from the Hospital and has not got that call   yet.   ---------------------------------------------------------------------------  --------------  [id number]  [phone]; OK to leave message on voicemail, OK to respond with   electronic message via [employer] portal (only for patients who have   registered [employer] account)  ---------------------------------------------------------------------------  --------------  SCRIPT ANSWERS  Relationship to Patient?  Self

## 2022-12-02 NOTE — TELEPHONE ENCOUNTER
Daughter called stating that patient has an order for spine injection on 12/8 and has to be taken off Coumadin prior to procedure and she wants to know what WM would like for her to take for blood pressure in the meantime.

## 2022-12-06 ENCOUNTER — TELEPHONE (OUTPATIENT)
Dept: CARDIOLOGY CLINIC | Age: 77
End: 2022-12-06

## 2022-12-06 NOTE — TELEPHONE ENCOUNTER
CARDIAC CLEARANCE     What type of procedure are you having? Putting cement in the fracture part of her verbrate     Which physician is performing your procedure? Radiology Dept     When is your procedure scheduled for? 12/08    Where are you having this procedure? [facility]    Are you taking Blood Thinners? Yes   If so what? (Name/dose/frequesncy)  Coumadin 5mg, aspirin 81mg     Does the surgeon want you to stop your blood thinner? If so for how long? Yes - 5 days prior    Phone Number and Contact Name for Physicians office:  Dr. Gray Post - [phone]    Fax number to send information:  Pt don't have the fax #      NOTE:  Pt is wanting to know if she should be off her blood thinner for 5 days. Pt is wanting a call back from the office since the surgery is this Thursday.   Please advise

## 2022-12-07 NOTE — TELEPHONE ENCOUNTER
And Dr. Greco Poster who prescribes the warfarin has already answered this question on 12/2.   ANDREI

## 2022-12-07 NOTE — TELEPHONE ENCOUNTER
I spoke to patient and Dr Lea Raymundo is managing her coumadin and has address when she should be off the coumadin which was sunday

## 2022-12-07 NOTE — TELEPHONE ENCOUNTER
Have to ask RG. I haven't seen this patient in ages. And it is already 1 day before procedure, so I don't think our answer is relevant at this point.   ANDREI

## 2022-12-08 ENCOUNTER — HOSPITAL ENCOUNTER (OUTPATIENT)
Dept: INTERVENTIONAL RADIOLOGY/VASCULAR | Age: 77
Discharge: HOME | End: 2022-12-08
Payer: MEDICARE

## 2022-12-08 VITALS
TEMPERATURE: 97 F | OXYGEN SATURATION: 96 % | WEIGHT: 200 LBS | DIASTOLIC BLOOD PRESSURE: 62 MMHG | SYSTOLIC BLOOD PRESSURE: 113 MMHG | BODY MASS INDEX: 32.14 KG/M2 | HEIGHT: 66 IN | HEART RATE: 90 BPM | RESPIRATION RATE: 16 BRPM

## 2022-12-08 DIAGNOSIS — S22.050A COMPRESSION FRACTURE OF T5 VERTEBRA, INITIAL ENCOUNTER (HCC): ICD-10-CM

## 2022-12-08 DIAGNOSIS — M80.08XA AGE-RELATED OSTEOPOROSIS WITH CURRENT PATHOLOGICAL FRACTURE, VERTEBRA(E), INITIAL ENCOUNTER FOR FRACTURE (HCC): ICD-10-CM

## 2022-12-08 LAB
ANION GAP SERPL CALCULATED.3IONS-SCNC: 6 MMOL/L (ref 3–16)
APTT: 25.6 SEC (ref 23–34.3)
BASOPHILS ABSOLUTE: 0 K/UL (ref 0–0.2)
BASOPHILS RELATIVE PERCENT: 0.6 %
BUN BLDV-MCNC: 46 MG/DL (ref 7–20)
CALCIUM SERPL-MCNC: 8.6 MG/DL (ref 8.3–10.6)
CHLORIDE BLD-SCNC: 100 MMOL/L (ref 99–110)
CO2: 35 MMOL/L (ref 21–32)
CREAT SERPL-MCNC: 2 MG/DL (ref 0.6–1.2)
EOSINOPHILS ABSOLUTE: 0.1 K/UL (ref 0–0.6)
EOSINOPHILS RELATIVE PERCENT: 1.7 %
GFR SERPL CREATININE-BSD FRML MDRD: 25 ML/MIN/{1.73_M2}
GLUCOSE BLD-MCNC: 91 MG/DL (ref 70–99)
HCT VFR BLD CALC: 37.2 % (ref 36–48)
HEMOGLOBIN: 11.8 G/DL (ref 12–16)
INR BLD: 1.06 (ref 0.87–1.14)
LYMPHOCYTES ABSOLUTE: 1 K/UL (ref 1–5.1)
LYMPHOCYTES RELATIVE PERCENT: 13.6 %
MCH RBC QN AUTO: 29.3 PG (ref 26–34)
MCHC RBC AUTO-ENTMCNC: 31.8 G/DL (ref 31–36)
MCV RBC AUTO: 92 FL (ref 80–100)
MONOCYTES ABSOLUTE: 0.6 K/UL (ref 0–1.3)
MONOCYTES RELATIVE PERCENT: 8.8 %
NEUTROPHILS ABSOLUTE: 5.5 K/UL (ref 1.7–7.7)
NEUTROPHILS RELATIVE PERCENT: 75.3 %
PDW BLD-RTO: 17.4 % (ref 12.4–15.4)
PLATELET # BLD: 180 K/UL (ref 135–450)
PMV BLD AUTO: 8.9 FL (ref 5–10.5)
POTASSIUM SERPL-SCNC: 3.6 MMOL/L (ref 3.5–5.1)
PROTHROMBIN TIME: 13.7 SEC (ref 11.7–14.5)
RBC # BLD: 4.04 M/UL (ref 4–5.2)
SODIUM BLD-SCNC: 141 MMOL/L (ref 136–145)
WBC # BLD: 7.3 K/UL (ref 4–11)

## 2022-12-08 PROCEDURE — 85730 THROMBOPLASTIN TIME PARTIAL: CPT

## 2022-12-08 PROCEDURE — C1713 ANCHOR/SCREW BN/BN,TIS/BN: HCPCS

## 2022-12-08 PROCEDURE — 36415 COLL VENOUS BLD VENIPUNCTURE: CPT

## 2022-12-08 PROCEDURE — 2500000003 HC RX 250 WO HCPCS: Performed by: RADIOLOGY

## 2022-12-08 PROCEDURE — 2580000003 HC RX 258: Performed by: RADIOLOGY

## 2022-12-08 PROCEDURE — 80048 BASIC METABOLIC PNL TOTAL CA: CPT

## 2022-12-08 PROCEDURE — 99153 MOD SED SAME PHYS/QHP EA: CPT

## 2022-12-08 PROCEDURE — 6360000004 HC RX CONTRAST MEDICATION: Performed by: RADIOLOGY

## 2022-12-08 PROCEDURE — 7100000010 HC PHASE II RECOVERY - FIRST 15 MIN

## 2022-12-08 PROCEDURE — 85610 PROTHROMBIN TIME: CPT

## 2022-12-08 PROCEDURE — 6360000002 HC RX W HCPCS: Performed by: RADIOLOGY

## 2022-12-08 PROCEDURE — 99152 MOD SED SAME PHYS/QHP 5/>YRS: CPT

## 2022-12-08 PROCEDURE — 85025 COMPLETE CBC W/AUTO DIFF WBC: CPT

## 2022-12-08 PROCEDURE — 7100000011 HC PHASE II RECOVERY - ADDTL 15 MIN

## 2022-12-08 PROCEDURE — 22513 PERQ VERTEBRAL AUGMENTATION: CPT

## 2022-12-08 RX ORDER — ACETAMINOPHEN 325 MG/1
650 TABLET ORAL EVERY 4 HOURS PRN
Status: DISCONTINUED | OUTPATIENT
Start: 2022-12-08 | End: 2022-12-09 | Stop reason: HOSPADM

## 2022-12-08 RX ORDER — MIDAZOLAM HYDROCHLORIDE 5 MG/ML
INJECTION, SOLUTION INTRAMUSCULAR; INTRAVENOUS
Status: COMPLETED | OUTPATIENT
Start: 2022-12-08 | End: 2022-12-08

## 2022-12-08 RX ORDER — LIDOCAINE HYDROCHLORIDE 10 MG/ML
10 INJECTION, SOLUTION EPIDURAL; INFILTRATION; INTRACAUDAL; PERINEURAL ONCE
Status: COMPLETED | OUTPATIENT
Start: 2022-12-08 | End: 2022-12-08

## 2022-12-08 RX ORDER — KETOROLAC TROMETHAMINE 30 MG/ML
INJECTION, SOLUTION INTRAMUSCULAR; INTRAVENOUS
Status: COMPLETED | OUTPATIENT
Start: 2022-12-08 | End: 2022-12-08

## 2022-12-08 RX ORDER — FENTANYL CITRATE 50 UG/ML
INJECTION, SOLUTION INTRAMUSCULAR; INTRAVENOUS
Status: COMPLETED | OUTPATIENT
Start: 2022-12-08 | End: 2022-12-08

## 2022-12-08 RX ORDER — BUPIVACAINE HYDROCHLORIDE 5 MG/ML
10 INJECTION, SOLUTION EPIDURAL; INTRACAUDAL
Status: COMPLETED | OUTPATIENT
Start: 2022-12-08 | End: 2022-12-08

## 2022-12-08 RX ADMIN — MIDAZOLAM HYDROCHLORIDE 0.5 MG: 5 INJECTION, SOLUTION INTRAMUSCULAR; INTRAVENOUS at 11:06

## 2022-12-08 RX ADMIN — FENTANYL CITRATE 25 MCG: 50 INJECTION, SOLUTION INTRAMUSCULAR; INTRAVENOUS at 10:59

## 2022-12-08 RX ADMIN — MIDAZOLAM HYDROCHLORIDE 1 MG: 5 INJECTION, SOLUTION INTRAMUSCULAR; INTRAVENOUS at 10:59

## 2022-12-08 RX ADMIN — CEFAZOLIN 2000 MG: 1 INJECTION, POWDER, FOR SOLUTION INTRAVENOUS at 10:29

## 2022-12-08 RX ADMIN — FENTANYL CITRATE 25 MCG: 50 INJECTION, SOLUTION INTRAMUSCULAR; INTRAVENOUS at 11:06

## 2022-12-08 RX ADMIN — MIDAZOLAM HYDROCHLORIDE 1 MG: 5 INJECTION, SOLUTION INTRAMUSCULAR; INTRAVENOUS at 10:43

## 2022-12-08 RX ADMIN — KETOROLAC TROMETHAMINE 15 MG: 30 INJECTION, SOLUTION INTRAMUSCULAR; INTRAVENOUS at 11:12

## 2022-12-08 RX ADMIN — MIDAZOLAM HYDROCHLORIDE 0.5 MG: 5 INJECTION, SOLUTION INTRAMUSCULAR; INTRAVENOUS at 10:50

## 2022-12-08 RX ADMIN — FENTANYL CITRATE 25 MCG: 50 INJECTION, SOLUTION INTRAMUSCULAR; INTRAVENOUS at 10:50

## 2022-12-08 RX ADMIN — FENTANYL CITRATE 50 MCG: 50 INJECTION, SOLUTION INTRAMUSCULAR; INTRAVENOUS at 10:43

## 2022-12-08 RX ADMIN — FENTANYL CITRATE 25 MCG: 50 INJECTION, SOLUTION INTRAMUSCULAR; INTRAVENOUS at 10:56

## 2022-12-08 RX ADMIN — BUPIVACAINE HYDROCHLORIDE 50 MG: 5 INJECTION, SOLUTION EPIDURAL; INTRACAUDAL at 11:31

## 2022-12-08 RX ADMIN — MIDAZOLAM HYDROCHLORIDE 1 MG: 5 INJECTION, SOLUTION INTRAMUSCULAR; INTRAVENOUS at 10:56

## 2022-12-08 RX ADMIN — IOPAMIDOL 30 ML: 408 INJECTION, SOLUTION INTRATHECAL at 11:32

## 2022-12-08 RX ADMIN — LIDOCAINE HYDROCHLORIDE 10 ML: 10 INJECTION, SOLUTION EPIDURAL; INFILTRATION; INTRACAUDAL; PERINEURAL at 11:32

## 2022-12-08 ASSESSMENT — PAIN SCALES - GENERAL
PAINLEVEL_OUTOF10: 0

## 2022-12-08 ASSESSMENT — PAIN - FUNCTIONAL ASSESSMENT: PAIN_FUNCTIONAL_ASSESSMENT: NONE - DENIES PAIN

## 2022-12-08 NOTE — BRIEF OP NOTE
Brief Postoperative Note    Nely Mckeon  YOB: 1945  [id number]    Pre-operative Diagnosis: T5 compression fracture    Post-operative Diagnosis: Same    Procedure: T5 Kyphoplasty    Anesthesia: Moderate Sedation    Surgeons: Jarred Hurtado MD    Estimated Blood Loss: Less than 5 mL    Complications: None    Specimens: Was Not Obtained    Findings: Successful T5 kyphoplasty.     Electronically signed by Jarred Hurtado MD on 12/8/2022 at 11:20 AM

## 2022-12-08 NOTE — PROGRESS NOTES
Pt discharged home per MD orders. Pt, pt  and pt daughter given discharge instructions including medications, follow up, wound care, signs of complications and numbers to call with any questions. Pt peripheral Iv removed, intact, bleeding controlled. Pt safely transported off unit with all belongings. Pt transported home by . Kypho site w/o hemtoma. At time of d/c. Site remained stable.

## 2022-12-08 NOTE — PRE SEDATION
Sedation Pre-Procedure Note    Patient Name: Yesenia Neri   YOB: 1945  Room/Bed: Room/bed info not found  Medical Record Number: [id number]  Date: 2022   Time: 11:19 AM       Indication:  T5 Kyphoplasty. Consent: I have discussed with the patient and/or the patient representative the indication, alternatives, and the possible risks and/or complications of the planned procedure and the anesthesia methods. The patient and/or patient representative appear to understand and agree to proceed. Vital Signs:   Vitals:    22 1115   BP: 115/77   Pulse: (!) 108   Resp: 19   Temp:    SpO2: 92%       Past Medical History:   has a past medical history of Asthma, Atrial fibrillation ([address] Eosinophilia, Hemoptysis, HIGH CHOLESTEROL, Hx of blood clots, Hypertension, Irregular heart beat, Other specified gastritis without mention of hemorrhage, Palpitations, Skin cancer, and Type II or unspecified type diabetes mellitus without mention of complication, not stated as uncontrolled. Past Surgical History:   has a past surgical history that includes Cholecystectomy;  section; Colonoscopy (2017); skin biopsy; bronchoscopy (2016); Coronary artery bypass graft (2018); Mitral valve replacement (2018); transesophageal echocardiogram (2018); Tunneled venous catheter placement (Left, 2018); Cardiac catheterization (2018); Insertable Cardiac Monitor (Left, 2018); Colonoscopy (2014); Upper gastrointestinal endoscopy (N/A, 10/10/2018); Colonoscopy (10/10/2018); Colonoscopy (N/A, 2020); Pain management procedure (N/A, 2020); Pain management procedure (Bilateral, 2021); and Cardiac catheterization ( and 5/3 2021). Medications:   Scheduled Meds:   Continuous Infusions:   PRN Meds:   Home Meds:   Prior to Admission medications    Medication Sig Start Date End Date Taking?  Authorizing Provider   levothyroxine (SYNTHROID) 100 MCG tablet TAKE 1 TABLET DAILY 11/16/22   Zeinab Haro MD   midodrine (PROAMATINE) 2.5 MG tablet Take 1 tablet by mouth 3 times daily 11/16/22   Zeinab Haro MD   vitamin D (ERGOCALCIFEROL) 1.25 MG (22856 UT) CAPS capsule TAKE ONE CAPSULE BY MOUTH ONCE WEEKLY 11/16/22   Zeinab Haro MD   calcitRIOL (ROCALTROL) 0.25 MCG capsule 1 tablet Monday, Wednesday and Friday 11/16/22   Zeinab Haro MD   acetaminophen-codeine (TYLENOL #3) 300-30 MG per tablet Take 1 tablet by mouth every 6 hours as needed for Pain for up to 30 days.  11/16/22 12/16/22  Zeinab Haro MD   nystatin (MYCOSTATIN) 229789 UNIT/ML suspension TAKE ONE TEASPOONFUL BY MOUTH FOUR TIMES A DAY FOR 5 DAYS 10/10/22   Zeinab Haro MD   predniSONE (DELTASONE) 10 MG tablet TAKE 1 TABLET AS NEEDED (EOSINOPHILIC GASATRITIS) 27/3/50   Zeinab Haro MD   spironolactone (ALDACTONE) 25 MG tablet TAKE 1 TABLET TWICE A DAY 9/15/22   SOLEDAD Escobar - CNS   blood glucose test strips (FREESTYLE LITE) strip USE TO TEST BLOOD SUGAR FOUR TIMES A DAY 9/15/22   Airam Alas MD   torsemide (DEMADEX) 20 MG tablet TAKE 2 TABLETS TWICE A DAY 9/14/22   Zeinab Haro MD   warfarin (COUMADIN) 5 MG tablet TAKE 1 TABLET DAILY 9/14/22   Zeinab Haro MD   potassium chloride (KLOR-CON M) 10 MEQ extended release tablet TAKE 1 TABLET DAILY 8/31/22   Zeinab Haro MD   metoprolol succinate (TOPROL XL) 50 MG extended release tablet TAKE 1 TABLET DAILY 8/31/22   Zeinab Haro MD   montelukast (SINGULAIR) 10 MG tablet TAKE 1 TABLET NIGHTLY 8/31/22   Zeinab Haro MD   insulin lispro, 1 Unit Dial, (HUMALOG KWIKPEN) 100 UNIT/ML SOPN USE SLIDING SCALE, 140-199, 2 UNITS, 200-249, 3 UNITS, 250-300, 4 UNITS, GREATER THAN 300, INJECT 5 UNITS 8/15/22   Zeinab Haro MD   metOLazone (ZAROXOLYN) 2.5 MG tablet TAKE 1 TABLET ON TUESDAY AND FRIDAY AND AS DIRECTED 5/3/22   SOLEDAD Escobar - CNS   amiodarone (CORDARONE) 200 MG tablet TAKE 1 TABLET DAILY  Patient taking differently: Take 100 mg by mouth daily 3/10/22   Gregorio George MD   insulin glargine (LANTUS SOLOSTAR) 100 UNIT/ML injection pen INJECT 20 UNITS IN THE MORNING  Patient taking differently: 22 Units INJECT 20 UNITS IN THE MORNING 11/22/21   Lisa Hernandez MD   albuterol sulfate  (90 Base) MCG/ACT inhaler USE 2 INHALATIONS EVERY 6 HOURS AS NEEDED FOR WHEEZING 11/19/21   Lisa Hernandez MD   ipratropium-albuterol (DUONEB) 0.5-2.5 (3) MG/3ML SOLN nebulizer solution Inhale 3 mLs into the lungs every 4 hours as needed for Shortness of Breath 11/15/21   Lisa Hernandez MD   FreeStyle Lancets MISC 1 each by Does not apply route 4 times daily 9/7/21   Lisa Hernandez MD   Blood Glucose Monitoring Suppl (FREESTYLE LITE) GAETANO 1 Device by Does not apply route 4 times daily    Historical Provider, MD   Insulin Pen Needle (BD PEN NEEDLE JANNET U/F) 32G X 4 MM MISC USE 1 PEN NEEDLE FOUR TIMES A DAY 6/14/21   Lisa Hernandez MD   magnesium oxide (MAG-OX) 400 MG tablet Take 1 tablet by mouth daily 5/10/21   Jocelyne Jarrett MD   ACETAMINOPHEN PO Take 500 mg by mouth every 6 hours as needed     Historical Provider, MD   albuterol (PROVENTIL) (2.5 MG/3ML) 0.083% nebulizer solution Take 3 mLs by nebulization every 6 hours as needed for Wheezing 6/2/20   Lisa Hernandez MD   polyethylene glycol (GLYCOLAX) 17 GM/SCOOP powder Take 17 g by mouth daily     Historical Provider, MD   omeprazole (PRILOSEC) 20 MG delayed release capsule Take 20 mg by mouth daily    Historical Provider, MD   ondansetron (ZOFRAN) 4 MG tablet Take 1 tablet by mouth 3 times daily as needed for Nausea or Vomiting 3/26/20   Lisa Hernandez MD   aspirin 81 MG chewable tablet Take 1 tablet by mouth daily 6/7/19   Lisa Hernandez MD   vitamin B-12 500 MCG tablet Take 1 tablet by mouth daily 10/12/18   Jake Bishop MD     Coumadin Use Last 7 Days:  no  Antiplatelet drug therapy use last 7 days: no  Other anticoagulant use last 7 days: no  Additional Medication Information:  n/a      Pre-Sedation Documentation and Exam:   I have reviewed the patient's history and review of systems.     Mallampati Airway Assessment:  Mallampati Class II - (soft palate, fauces & uvula are visible)    Prior History of Anesthesia Complications:   none    ASA Classification:  Class 2 - A normal healthy patient with mild systemic disease    Sedation/ Anesthesia Plan:   intravenous sedation    Medications Planned:   midazolam (Versed) intravenously and fentanyl intravenously    Patient is an appropriate candidate for plan of sedation: yes    Electronically signed by Ilya Aguero MD on 12/8/2022 at 11:19 AM

## 2022-12-08 NOTE — PROGRESS NOTES
Pt arrived from IR s/p kyphoplasty. Site appears swollen, dressing with scant old drainage, Radiology RN notified MD. Applied towel for pressure. Pt denied pain, resting comfortable. , pt baseline. Will continue to monitor.

## 2022-12-08 NOTE — DISCHARGE INSTRUCTIONS
Follow up with your  Dr. with any questions, concerns, results, and an appointment after your procedure in the time period recommended. Kyphoplasty Instructions       Fatigue is common and expected. Let pain be your guide. Walking or standing is encouraged every hour that you are awake; gradually increase walking to 1-2 miles per day. It is OK to go up and down stairs and to walk outside. Do NOT smoke. Smoking delays healing and increases the risk of infection. Apply ice intermittently to incision area for the first two days. Then you may use heat instead if desired. Incision Care  Do not submerge your incision under water. No tub baths, hot tubs or swimming until after your doctors approval.  (Usually two weeks)    Incisions normally have a small amount of clear or pink drainage. If necessary, cover with a gauze and tape dressing. If you have a temperature of 101 degrees (unrelieved by Tylenol), or a large amount of drainage or foul smelling drainage, call your doctor right away. You may remove the bandaid and shower after 24 hours. Pat incision dry after shower. SEDATION DISCHARGE INSTRUCTIONS   12/8/2022    Wear your seatbelt home. You are under the influence of sedative medications. Do not drink alcohol, drive, operate machinery, or make any important decisions or sign any legal documents for 24 hours. A responsible adult needs to be with you for 24 hours. You may experience lightheadedness, dizziness, nausea, heightened emotions and/or sleepiness following surgery. Rest at home today- increase activity as tolerated. Progress slowly to a regular diet and drink plenty of fluids unless your physician has instructed you otherwise. If nausea becomes a problem, call your physician. Coughing, sore throat, and muscle aches are other side effects of anesthesia and should improve with time. Do not drive or operate machinery while taking narcotics.   ATTENTION FEMALE PATIENTS: if you use an oral contraceptive or birth control pill, you need to use an additional or alternative method for pregnancy prevention for the next thirty days. Medications given today may render your contraceptive ineffective for this cycle. You may receive a phone call from a nurse or tech to check on you in the next couple of days. The interventional radiologist you saw today does not usually follow-up with you after your procedure. He/she does not change or prescribe medications or treatments. All questions after today should be answered by your prescribing physician. You may resume any blood thinners or anticoagulants (Plavix, coumadin, aspirin, Eliquis, etc.)          tomorrow unless otherwise instructed. The interventional radiologist you saw today is Dr. Russell Tran, for your records.

## 2022-12-09 ENCOUNTER — TELEPHONE (OUTPATIENT)
Dept: INTERVENTIONAL RADIOLOGY/VASCULAR | Age: 77
End: 2022-12-09

## 2022-12-14 ENCOUNTER — OFFICE VISIT (OUTPATIENT)
Dept: FAMILY MEDICINE CLINIC | Age: 77
End: 2022-12-14
Payer: MEDICARE

## 2022-12-14 VITALS
BODY MASS INDEX: 32.12 KG/M2 | OXYGEN SATURATION: 96 % | SYSTOLIC BLOOD PRESSURE: 94 MMHG | WEIGHT: 199 LBS | HEART RATE: 106 BPM | DIASTOLIC BLOOD PRESSURE: 68 MMHG | TEMPERATURE: 96.8 F

## 2022-12-14 DIAGNOSIS — S22.009D CLOSED FRACTURE OF MULTIPLE THORACIC VERTEBRAE WITH ROUTINE HEALING: ICD-10-CM

## 2022-12-14 DIAGNOSIS — E11.649 HYPOGLYCEMIA DUE TO TYPE 2 DIABETES MELLITUS (HCC): ICD-10-CM

## 2022-12-14 DIAGNOSIS — I50.32 CHRONIC DIASTOLIC HEART FAILURE (HCC): ICD-10-CM

## 2022-12-14 DIAGNOSIS — M17.0 PRIMARY OSTEOARTHRITIS OF BOTH KNEES: ICD-10-CM

## 2022-12-14 DIAGNOSIS — L03.115 CELLULITIS OF LEG, RIGHT: Primary | ICD-10-CM

## 2022-12-14 DIAGNOSIS — B37.0 THRUSH, ORAL: ICD-10-CM

## 2022-12-14 PROCEDURE — 3078F DIAST BP <80 MM HG: CPT | Performed by: FAMILY MEDICINE

## 2022-12-14 PROCEDURE — 3074F SYST BP LT 130 MM HG: CPT | Performed by: FAMILY MEDICINE

## 2022-12-14 PROCEDURE — 99214 OFFICE O/P EST MOD 30 MIN: CPT | Performed by: FAMILY MEDICINE

## 2022-12-14 PROCEDURE — 1123F ACP DISCUSS/DSCN MKR DOCD: CPT | Performed by: FAMILY MEDICINE

## 2022-12-14 PROCEDURE — 3044F HG A1C LEVEL LT 7.0%: CPT | Performed by: FAMILY MEDICINE

## 2022-12-14 RX ORDER — OXYCODONE HYDROCHLORIDE 5 MG/1
5 TABLET ORAL 3 TIMES DAILY PRN
Qty: 90 TABLET | Refills: 0 | Status: SHIPPED | OUTPATIENT
Start: 2022-12-14 | End: 2023-01-13

## 2022-12-14 RX ORDER — FLUCONAZOLE 100 MG/1
100 TABLET ORAL DAILY
Qty: 7 TABLET | Refills: 0 | Status: SHIPPED | OUTPATIENT
Start: 2022-12-14 | End: 2022-12-21

## 2022-12-14 RX ORDER — INSULIN GLARGINE 100 [IU]/ML
16 INJECTION, SOLUTION SUBCUTANEOUS EVERY MORNING
Qty: 90 ML | Refills: 1
Start: 2022-12-14

## 2022-12-14 RX ORDER — CEPHALEXIN 500 MG/1
500 CAPSULE ORAL 3 TIMES DAILY
Qty: 21 CAPSULE | Refills: 0 | Status: SHIPPED | OUTPATIENT
Start: 2022-12-14 | End: 2022-12-21

## 2022-12-14 NOTE — PROGRESS NOTES
Subjective:      Patient ID: Alfonso Rodriguez is a 77 y.o. female. CC: Patient presents for acute medical problem-right leg redness, leg swelling, hyperglycemia, and thrush and back pain. Medical assistant notes reviewed. HPI Patient presents for swelling in her feet and legs for the past week. Patient has known diastolic heart failure. Typically when her weight goes up she does take extra doses of diuretic and without her weight will come down. Unfortunate her right leg is now red and uncomfortable for her. She denies any fevers or chills. Patient states she still has thrush in her mouth. Patient does have a history of thrush. She was just treated with Magic mouthwash per cardiology but she thought this caused her mouth to burn. Patient has had a recent kyphoplasty for a T5 fracture and she has noted other fractures in her back. None of the other fractures appear to be changed per radiology. Patient last night was started having more lower back pain which is atypical for her. She states the upper back pain where she had the prior fracture is now significantly improved. She does request a refill of oxycodone to take on a as needed basis. She is also been having low blood sugar readings especially early in the morning work blood sugars are typically . She occasionally does have a hypoglycemic episode in the evening time. She is taking 20 units of insulin every morning. Review of Systems      Allergies   Allergen Reactions    Tgjmozwo-Qwhygej-Cpiyyi [Fluocinolone] Shortness Of Breath    Ciprofloxacin Shortness Of Breath    Diovan [Valsartan] Shortness Of Breath    Flagyl [Metronidazole] Shortness Of Breath     Has taken diflucan at home 12/7/15    Metformin And Related [Metformin And Related] Shortness Of Breath    Benazepril      Other reaction(s): Not Recorded    Morphine      Bad reaction. \"makes her feel horrible\".      Saxagliptin      Other reaction(s): Not Recorded    Levaquin [Levofloxacin] Rash      Objective:   Physical Exam  Constitutional:       General: She is not in acute distress. Appearance: She is well-developed. HENT:      Mouth/Throat:      Mouth: Mucous membranes are dry. Dentition: Gum lesions present. Pharynx: Oropharynx is clear. Uvula midline. Comments: Thrush involving predominantly the gums in tongue area. No oropharyngeal involvement    Cardiovascular:      Rate and Rhythm: Normal rate and regular rhythm. Pulses:           Dorsalis pedis pulses are 2+ on the right side and 2+ on the left side. Posterior tibial pulses are 2+ on the right side and 2+ on the left side. Heart sounds: Normal heart sounds. No murmur heard. No friction rub. No gallop. Pulmonary:      Effort: Pulmonary effort is normal.      Breath sounds: Normal breath sounds. Musculoskeletal:      Right lower leg: Edema (2 plus bilaterally with right leg having warmth and redness of the anterior shin of approximately 10 cm) present. Left lower leg: Edema present. Neurological:      Mental Status: She is alert and oriented to person, place, and time. Sensory: Sensation is intact. Motor: Motor function is intact. Psychiatric:         Behavior: Behavior is cooperative. Assessment:      Tootie Valdes was seen today for leg swelling and foot swelling. Diagnoses and all orders for this visit:    Cellulitis of leg, right    Chronic diastolic heart failure ([address]    Thrush, oral    Hypoglycemia due to type 2 diabetes mellitus ([address]    Primary osteoarthritis of both knees  -     oxyCODONE (ROXICODONE) 5 MG immediate release tablet; Take 1 tablet by mouth 3 times daily as needed for Pain for up to 30 days. Closed fracture of multiple thoracic vertebrae with routine healing    Other orders  -     cephALEXin (KEFLEX) 500 MG capsule; Take 1 capsule by mouth 3 times daily for 7 days  -     fluconazole (DIFLUCAN) 100 MG tablet;  Take 1 tablet by mouth daily for 7 days Take 5 mg coumadin when taking diflucan  -     insulin glargine (LANTUS SOLOSTAR) 100 UNIT/ML injection pen; Inject 16 Units into the skin every morning          Plan:      Begin antibiotic therapy for cellulitis    Hold off on treating oral thrush until after she is done with antibiotic therapy    Patient was advised obviously to continue taking the extra dose of diuretic when she has leg edema secondary to underlying heart failure    For the back fractures she may continue taking pain medication on a as needed basis. If back pain worsens she may indeed need a kyphoplasty of the T10 area. For the hypoglycemia and reduce down insulin to 16 units and patient can report back her blood sugars are in the next 2 weeks    RTC at normal appointment time in early January    Medical decision making of moderate complexity. Please note that this chart was generated using Dragon dictation software. Although every effort was made to ensure the accuracy of this automated transcription, some errors in transcription may have occurred.

## 2022-12-19 ENCOUNTER — NURSE ONLY (OUTPATIENT)
Dept: CARDIOLOGY CLINIC | Age: 77
End: 2022-12-19
Payer: MEDICARE

## 2022-12-19 DIAGNOSIS — I50.22 CHRONIC SYSTOLIC CHF (CONGESTIVE HEART FAILURE), NYHA CLASS 3 (HCC): Primary | ICD-10-CM

## 2022-12-19 DIAGNOSIS — Z95.810 ICD (IMPLANTABLE CARDIOVERTER-DEFIBRILLATOR) IN PLACE: ICD-10-CM

## 2022-12-19 DIAGNOSIS — I25.5 ISCHEMIC CARDIOMYOPATHY: ICD-10-CM

## 2022-12-19 PROCEDURE — 93297 REM INTERROG DEV EVAL ICPMS: CPT | Performed by: NURSE PRACTITIONER

## 2022-12-19 PROCEDURE — 93295 DEV INTERROG REMOTE 1/2/MLT: CPT | Performed by: INTERNAL MEDICINE

## 2022-12-19 PROCEDURE — 93296 REM INTERROG EVL PM/IDS: CPT | Performed by: INTERNAL MEDICINE

## 2022-12-19 NOTE — PROGRESS NOTES
Remote transmission received from patient's dual chamber ICD home monitor. Transmission shows normal sensing and pacing function. 83.1% AT/ AF burden with uncontrolled Vrates (coumadin, Toprol, amiodarone)    Optivol is WNL. TriageHF Heart Failure Risk Status on 19-Dec-2022 is High    EP/ NP will review. See interrogation under cardiology tab in the [address] [place] [address] [address] field for more details. Will continue to monitor remotely. (End of 91-day monitoring period 12/19/22).

## 2022-12-20 ENCOUNTER — TELEPHONE (OUTPATIENT)
Dept: FAMILY MEDICINE CLINIC | Age: 77
End: 2022-12-20

## 2022-12-20 DIAGNOSIS — R79.1 SUPRATHERAPEUTIC INR: ICD-10-CM

## 2022-12-20 DIAGNOSIS — B37.0 THRUSH, ORAL: Primary | ICD-10-CM

## 2022-12-20 NOTE — TELEPHONE ENCOUNTER
fluconazole (DIFLUCAN) 100 MG tablet [[id number]]     Order Details  Dose: 100 mg Route: Oral Frequency: DAILY   Dispense Quantity: 7 tablet Refills: 0          Sig: Take 1 tablet by mouth daily for 7 days Take 5 mg coumadin when taking diflucan         [facility] [id number] [place], [place] - [address], [address] [place] [address]   Phone:  [phone]  Fax:  [phone]

## 2022-12-21 RX ORDER — PHYTONADIONE 5 MG/1
5 TABLET ORAL ONCE
Qty: 1 TABLET | Refills: 0 | Status: SHIPPED | OUTPATIENT
Start: 2022-12-21 | End: 2022-12-21

## 2022-12-21 RX ORDER — FLUCONAZOLE 100 MG/1
100 TABLET ORAL DAILY
Qty: 7 TABLET | Refills: 0 | Status: SHIPPED | OUTPATIENT
Start: 2022-12-21 | End: 2022-12-28

## 2022-12-21 NOTE — TELEPHONE ENCOUNTER
Spoke with pt's  and he stated that WM said to call for a refill on this medication if pt's thrush did cleared up with initial dosage.  He stated that is better but not completley gone

## 2022-12-21 NOTE — TELEPHONE ENCOUNTER
Pt advise and stated that the medication sent will cost her 105$ just for that one pill. Pt cant afford this. She also stated that her INR has done this in the past and she just stays off coumadin till her level are in range.

## 2022-12-21 NOTE — TELEPHONE ENCOUNTER
Made follow up phone call to patient advised she can get the Vitamin K for around $20 at [facility] with [facility]. She will consider it. She states she is getting ready to eat some mustard greens now. This has happened in the past with her INR and she just holds her dosing until it returns to normal.  Will call tomorrow with INR reading.

## 2022-12-21 NOTE — TELEPHONE ENCOUNTER
Consulted with Dr. Ashley Sierra. Hold coumadin. Eat tons of green veggies today. Take 5mg of vitamin K - sent to pharmacy need to  and take today. Repeat INR tomorrow and call office with report. Swish gargle and spit Nystatin - no fluconazole due to INR.

## 2022-12-21 NOTE — TELEPHONE ENCOUNTER
Diflucan can still work after finishing dosing. Monitor thrush with no nystatin and we will work with her on it depending on the INR.

## 2022-12-21 NOTE — TELEPHONE ENCOUNTER
Pt advise and she stated that she is allergic to the nystatin. Mouth gets worse and worse with it.  She has tried in the past.  Please advise

## 2022-12-21 NOTE — TELEPHONE ENCOUNTER
Refilled. When is she due for her repeat INR? Looks like was not therapeutic last drawing, but that may have been for a procedure?

## 2022-12-22 NOTE — TELEPHONE ENCOUNTER
Pt's  called in with updated INR number. He said it's still over 6. He states he can't give an exact number because the meter only goes to 6.0, and then it just gives a message saying it's over range.   Please advise

## 2022-12-22 NOTE — TELEPHONE ENCOUNTER
Did she take the Vit K prescribed? If not needs to take it now. If so then needs another dose 5mg and call tomorrow with INR level.  We are closed but they can page me

## 2022-12-27 ENCOUNTER — ANTI-COAG VISIT (OUTPATIENT)
Dept: FAMILY MEDICINE CLINIC | Age: 77
End: 2022-12-27

## 2022-12-27 LAB
INR BLD: 3
INR BLD: 4.5

## 2022-12-30 ENCOUNTER — ANTI-COAG VISIT (OUTPATIENT)
Dept: FAMILY MEDICINE CLINIC | Age: 77
End: 2022-12-30

## 2022-12-30 ENCOUNTER — TELEPHONE (OUTPATIENT)
Dept: FAMILY MEDICINE CLINIC | Age: 77
End: 2022-12-30

## 2022-12-30 LAB — INR BLD: 3.8

## 2022-12-30 NOTE — TELEPHONE ENCOUNTER
Patient's  calling on her behalf. Reporting INR level. INR level is 3.8.  states she is on Fluconazole which was started on 12/28. Spoke with provider. Provider advised to hold Coumadin and to recheck in 2 days. Also advised INR needs to be below 3 in order to resume Coumadin.  and wife advised and both stated understanding.

## 2023-01-03 ENCOUNTER — TELEPHONE (OUTPATIENT)
Dept: CARDIOLOGY CLINIC | Age: 78
End: 2023-01-03

## 2023-01-03 ENCOUNTER — HOSPITAL ENCOUNTER (INPATIENT)
Age: 78
LOS: 7 days | Discharge: HOME | DRG: 683 | End: 2023-01-10
Attending: EMERGENCY MEDICINE | Admitting: INTERNAL MEDICINE
Payer: MEDICARE

## 2023-01-03 ENCOUNTER — APPOINTMENT (OUTPATIENT)
Dept: CT IMAGING | Age: 78
DRG: 683 | End: 2023-01-03
Payer: MEDICARE

## 2023-01-03 ENCOUNTER — ANTI-COAG VISIT (OUTPATIENT)
Dept: FAMILY MEDICINE CLINIC | Age: 78
End: 2023-01-03

## 2023-01-03 ENCOUNTER — TELEPHONE (OUTPATIENT)
Dept: FAMILY MEDICINE CLINIC | Age: 78
End: 2023-01-03

## 2023-01-03 ENCOUNTER — APPOINTMENT (OUTPATIENT)
Dept: GENERAL RADIOLOGY | Age: 78
DRG: 683 | End: 2023-01-03
Payer: MEDICARE

## 2023-01-03 DIAGNOSIS — R29.898 RIGHT LEG WEAKNESS: ICD-10-CM

## 2023-01-03 DIAGNOSIS — R79.89 ELEVATED SERUM CREATININE: ICD-10-CM

## 2023-01-03 DIAGNOSIS — I38 CHRONIC SYSTOLIC HEART FAILURE DUE TO VALVULAR DISEASE (HCC): ICD-10-CM

## 2023-01-03 DIAGNOSIS — Z79.01 ANTICOAGULATED ON COUMADIN: ICD-10-CM

## 2023-01-03 DIAGNOSIS — I50.22 CHRONIC SYSTOLIC HEART FAILURE DUE TO VALVULAR DISEASE (HCC): ICD-10-CM

## 2023-01-03 DIAGNOSIS — M17.0 PRIMARY OSTEOARTHRITIS OF BOTH KNEES: ICD-10-CM

## 2023-01-03 DIAGNOSIS — R11.2 NAUSEA VOMITING AND DIARRHEA: Primary | ICD-10-CM

## 2023-01-03 DIAGNOSIS — R19.7 NAUSEA VOMITING AND DIARRHEA: Primary | ICD-10-CM

## 2023-01-03 DIAGNOSIS — R10.84 GENERALIZED ABDOMINAL PAIN: ICD-10-CM

## 2023-01-03 PROBLEM — N17.9 AKI (ACUTE KIDNEY INJURY) (HCC): Status: ACTIVE | Noted: 2023-01-03

## 2023-01-03 LAB
A/G RATIO: 0.9 (ref 1.1–2.2)
ALBUMIN SERPL-MCNC: 3.1 G/DL (ref 3.4–5)
ALP BLD-CCNC: 119 U/L (ref 40–129)
ALT SERPL-CCNC: 18 U/L (ref 10–40)
ANION GAP SERPL CALCULATED.3IONS-SCNC: 13 MMOL/L (ref 3–16)
AST SERPL-CCNC: 27 U/L (ref 15–37)
BACTERIA: ABNORMAL /HPF
BASOPHILS ABSOLUTE: 0 K/UL (ref 0–0.2)
BASOPHILS RELATIVE PERCENT: 0.6 %
BILIRUB SERPL-MCNC: 0.9 MG/DL (ref 0–1)
BILIRUBIN URINE: NEGATIVE
BLOOD, URINE: NEGATIVE
BUN BLDV-MCNC: 60 MG/DL (ref 7–20)
CALCIUM SERPL-MCNC: 8.9 MG/DL (ref 8.3–10.6)
CHLORIDE BLD-SCNC: 97 MMOL/L (ref 99–110)
CLARITY: ABNORMAL
CO2: 23 MMOL/L (ref 21–32)
COLOR: YELLOW
CREAT SERPL-MCNC: 2.4 MG/DL (ref 0.6–1.2)
EOSINOPHILS ABSOLUTE: 0.1 K/UL (ref 0–0.6)
EOSINOPHILS RELATIVE PERCENT: 1.8 %
EPITHELIAL CELLS, UA: 4 /HPF (ref 0–5)
GFR SERPL CREATININE-BSD FRML MDRD: 20 ML/MIN/{1.73_M2}
GLUCOSE BLD-MCNC: 123 MG/DL (ref 70–99)
GLUCOSE BLD-MCNC: 92 MG/DL (ref 70–99)
GLUCOSE URINE: NEGATIVE MG/DL
HCT VFR BLD CALC: 40.9 % (ref 36–48)
HEMOGLOBIN: 13.2 G/DL (ref 12–16)
HYALINE CASTS: 9 /LPF (ref 0–8)
HYALINE CASTS: PRESENT
INR BLD: 1.95 (ref 0.87–1.14)
INR BLD: 2.2
KETONES, URINE: NEGATIVE MG/DL
LACTIC ACID: 1 MMOL/L (ref 0.4–2)
LEUKOCYTE ESTERASE, URINE: ABNORMAL
LIPASE: 29 U/L (ref 13–60)
LYMPHOCYTES ABSOLUTE: 1.1 K/UL (ref 1–5.1)
LYMPHOCYTES RELATIVE PERCENT: 17.9 %
MAGNESIUM: 2.3 MG/DL (ref 1.8–2.4)
MCH RBC QN AUTO: 29.8 PG (ref 26–34)
MCHC RBC AUTO-ENTMCNC: 32.3 G/DL (ref 31–36)
MCV RBC AUTO: 92 FL (ref 80–100)
MICROSCOPIC EXAMINATION: YES
MONOCYTES ABSOLUTE: 0.7 K/UL (ref 0–1.3)
MONOCYTES RELATIVE PERCENT: 11.6 %
NEUTROPHILS ABSOLUTE: 4.3 K/UL (ref 1.7–7.7)
NEUTROPHILS RELATIVE PERCENT: 68.1 %
NITRITE, URINE: NEGATIVE
PDW BLD-RTO: 16 % (ref 12.4–15.4)
PERFORMED ON: NORMAL
PH UA: 5.5 (ref 5–8)
PLATELET # BLD: 193 K/UL (ref 135–450)
PMV BLD AUTO: 8.6 FL (ref 5–10.5)
POTASSIUM REFLEX MAGNESIUM: 4.6 MMOL/L (ref 3.5–5.1)
PROTEIN UA: NEGATIVE MG/DL
PROTHROMBIN TIME: 22.3 SEC (ref 11.7–14.5)
RBC # BLD: 4.45 M/UL (ref 4–5.2)
RBC UA: 0 /HPF (ref 0–4)
SODIUM BLD-SCNC: 133 MMOL/L (ref 136–145)
SPECIFIC GRAVITY UA: 1.01 (ref 1–1.03)
TOTAL PROTEIN: 6.6 G/DL (ref 6.4–8.2)
TROPONIN: 0.04 NG/ML
URINE REFLEX TO CULTURE: ABNORMAL
URINE TYPE: ABNORMAL
UROBILINOGEN, URINE: 1 E.U./DL
WBC # BLD: 6.3 K/UL (ref 4–11)
WBC UA: 4 /HPF (ref 0–5)

## 2023-01-03 PROCEDURE — 82607 VITAMIN B-12: CPT

## 2023-01-03 PROCEDURE — 70450 CT HEAD/BRAIN W/O DYE: CPT

## 2023-01-03 PROCEDURE — 83735 ASSAY OF MAGNESIUM: CPT

## 2023-01-03 PROCEDURE — 74176 CT ABD & PELVIS W/O CONTRAST: CPT

## 2023-01-03 PROCEDURE — 83605 ASSAY OF LACTIC ACID: CPT

## 2023-01-03 PROCEDURE — 96374 THER/PROPH/DIAG INJ IV PUSH: CPT

## 2023-01-03 PROCEDURE — 85610 PROTHROMBIN TIME: CPT

## 2023-01-03 PROCEDURE — 1200000000 HC SEMI PRIVATE

## 2023-01-03 PROCEDURE — 6360000002 HC RX W HCPCS: Performed by: PHYSICIAN ASSISTANT

## 2023-01-03 PROCEDURE — 71045 X-RAY EXAM CHEST 1 VIEW: CPT

## 2023-01-03 PROCEDURE — 6370000000 HC RX 637 (ALT 250 FOR IP): Performed by: INTERNAL MEDICINE

## 2023-01-03 PROCEDURE — 2580000003 HC RX 258: Performed by: PHYSICIAN ASSISTANT

## 2023-01-03 PROCEDURE — 2580000003 HC RX 258: Performed by: INTERNAL MEDICINE

## 2023-01-03 PROCEDURE — 84484 ASSAY OF TROPONIN QUANT: CPT

## 2023-01-03 PROCEDURE — 81001 URINALYSIS AUTO W/SCOPE: CPT

## 2023-01-03 PROCEDURE — 82746 ASSAY OF FOLIC ACID SERUM: CPT

## 2023-01-03 PROCEDURE — 99285 EMERGENCY DEPT VISIT HI MDM: CPT

## 2023-01-03 PROCEDURE — 80053 COMPREHEN METABOLIC PANEL: CPT

## 2023-01-03 PROCEDURE — 93005 ELECTROCARDIOGRAM TRACING: CPT | Performed by: EMERGENCY MEDICINE

## 2023-01-03 PROCEDURE — 83690 ASSAY OF LIPASE: CPT

## 2023-01-03 PROCEDURE — 36415 COLL VENOUS BLD VENIPUNCTURE: CPT

## 2023-01-03 PROCEDURE — 96361 HYDRATE IV INFUSION ADD-ON: CPT

## 2023-01-03 PROCEDURE — 85025 COMPLETE CBC W/AUTO DIFF WBC: CPT

## 2023-01-03 PROCEDURE — 6370000000 HC RX 637 (ALT 250 FOR IP): Performed by: NURSE PRACTITIONER

## 2023-01-03 RX ORDER — SODIUM CHLORIDE 0.9 % (FLUSH) 0.9 %
5-40 SYRINGE (ML) INJECTION EVERY 12 HOURS SCHEDULED
Status: DISCONTINUED | OUTPATIENT
Start: 2023-01-03 | End: 2023-01-10 | Stop reason: HOSPADM

## 2023-01-03 RX ORDER — 0.9 % SODIUM CHLORIDE 0.9 %
500 INTRAVENOUS SOLUTION INTRAVENOUS ONCE
Status: COMPLETED | OUTPATIENT
Start: 2023-01-03 | End: 2023-01-03

## 2023-01-03 RX ORDER — MIDODRINE HYDROCHLORIDE 5 MG/1
2.5 TABLET ORAL
Status: DISCONTINUED | OUTPATIENT
Start: 2023-01-04 | End: 2023-01-10 | Stop reason: HOSPADM

## 2023-01-03 RX ORDER — METOPROLOL SUCCINATE 50 MG/1
50 TABLET, EXTENDED RELEASE ORAL DAILY
Status: DISCONTINUED | OUTPATIENT
Start: 2023-01-04 | End: 2023-01-10 | Stop reason: HOSPADM

## 2023-01-03 RX ORDER — OXYCODONE HYDROCHLORIDE 5 MG/1
5 TABLET ORAL EVERY 6 HOURS PRN
Status: DISCONTINUED | OUTPATIENT
Start: 2023-01-03 | End: 2023-01-07

## 2023-01-03 RX ORDER — AMIODARONE HYDROCHLORIDE 200 MG/1
100 TABLET ORAL DAILY
Status: DISCONTINUED | OUTPATIENT
Start: 2023-01-04 | End: 2023-01-10 | Stop reason: HOSPADM

## 2023-01-03 RX ORDER — PANTOPRAZOLE SODIUM 40 MG/1
40 TABLET, DELAYED RELEASE ORAL
Status: DISCONTINUED | OUTPATIENT
Start: 2023-01-04 | End: 2023-01-10 | Stop reason: HOSPADM

## 2023-01-03 RX ORDER — WARFARIN SODIUM 5 MG/1
5 TABLET ORAL
Status: DISCONTINUED | OUTPATIENT
Start: 2023-01-03 | End: 2023-01-05 | Stop reason: DRUGHIGH

## 2023-01-03 RX ORDER — ALBUTEROL SULFATE 2.5 MG/3ML
2.5 SOLUTION RESPIRATORY (INHALATION) EVERY 6 HOURS PRN
Status: DISCONTINUED | OUTPATIENT
Start: 2023-01-03 | End: 2023-01-10 | Stop reason: HOSPADM

## 2023-01-03 RX ORDER — IPRATROPIUM BROMIDE AND ALBUTEROL SULFATE 2.5; .5 MG/3ML; MG/3ML
1 SOLUTION RESPIRATORY (INHALATION) EVERY 4 HOURS PRN
Status: DISCONTINUED | OUTPATIENT
Start: 2023-01-03 | End: 2023-01-10 | Stop reason: HOSPADM

## 2023-01-03 RX ORDER — LEVOTHYROXINE SODIUM 0.1 MG/1
100 TABLET ORAL DAILY
Status: DISCONTINUED | OUTPATIENT
Start: 2023-01-04 | End: 2023-01-07

## 2023-01-03 RX ORDER — CALCITRIOL 0.25 UG/1
0.25 CAPSULE, LIQUID FILLED ORAL DAILY
Status: DISCONTINUED | OUTPATIENT
Start: 2023-01-04 | End: 2023-01-10 | Stop reason: HOSPADM

## 2023-01-03 RX ORDER — SODIUM CHLORIDE 9 MG/ML
INJECTION, SOLUTION INTRAVENOUS PRN
Status: DISCONTINUED | OUTPATIENT
Start: 2023-01-03 | End: 2023-01-10 | Stop reason: HOSPADM

## 2023-01-03 RX ORDER — SODIUM CHLORIDE 0.9 % (FLUSH) 0.9 %
5-40 SYRINGE (ML) INJECTION PRN
Status: DISCONTINUED | OUTPATIENT
Start: 2023-01-03 | End: 2023-01-10 | Stop reason: HOSPADM

## 2023-01-03 RX ORDER — SODIUM CHLORIDE, SODIUM LACTATE, POTASSIUM CHLORIDE, CALCIUM CHLORIDE 600; 310; 30; 20 MG/100ML; MG/100ML; MG/100ML; MG/100ML
INJECTION, SOLUTION INTRAVENOUS CONTINUOUS
Status: DISCONTINUED | OUTPATIENT
Start: 2023-01-03 | End: 2023-01-04

## 2023-01-03 RX ORDER — ONDANSETRON 4 MG/1
4 TABLET, ORALLY DISINTEGRATING ORAL EVERY 8 HOURS PRN
Status: DISCONTINUED | OUTPATIENT
Start: 2023-01-03 | End: 2023-01-10 | Stop reason: HOSPADM

## 2023-01-03 RX ORDER — ONDANSETRON 2 MG/ML
4 INJECTION INTRAMUSCULAR; INTRAVENOUS EVERY 30 MIN PRN
Status: DISCONTINUED | OUTPATIENT
Start: 2023-01-03 | End: 2023-01-03 | Stop reason: HOSPADM

## 2023-01-03 RX ORDER — INSULIN LISPRO 100 [IU]/ML
0-8 INJECTION, SOLUTION INTRAVENOUS; SUBCUTANEOUS
Status: DISCONTINUED | OUTPATIENT
Start: 2023-01-04 | End: 2023-01-10 | Stop reason: HOSPADM

## 2023-01-03 RX ORDER — DEXTROSE MONOHYDRATE 100 MG/ML
INJECTION, SOLUTION INTRAVENOUS CONTINUOUS PRN
Status: DISCONTINUED | OUTPATIENT
Start: 2023-01-03 | End: 2023-01-10 | Stop reason: HOSPADM

## 2023-01-03 RX ORDER — SPIRONOLACTONE 25 MG/1
25 TABLET ORAL 2 TIMES DAILY
Status: DISCONTINUED | OUTPATIENT
Start: 2023-01-03 | End: 2023-01-04

## 2023-01-03 RX ORDER — WARFARIN SODIUM 2.5 MG/1
2.5 TABLET ORAL
Status: DISCONTINUED | OUTPATIENT
Start: 2023-01-09 | End: 2023-01-05 | Stop reason: DRUGHIGH

## 2023-01-03 RX ORDER — INSULIN LISPRO 100 [IU]/ML
0-4 INJECTION, SOLUTION INTRAVENOUS; SUBCUTANEOUS NIGHTLY
Status: DISCONTINUED | OUTPATIENT
Start: 2023-01-03 | End: 2023-01-10 | Stop reason: HOSPADM

## 2023-01-03 RX ORDER — POLYETHYLENE GLYCOL 3350 17 G/17G
17 POWDER, FOR SOLUTION ORAL DAILY PRN
Status: DISCONTINUED | OUTPATIENT
Start: 2023-01-03 | End: 2023-01-10 | Stop reason: HOSPADM

## 2023-01-03 RX ORDER — INSULIN GLARGINE 100 [IU]/ML
16 INJECTION, SOLUTION SUBCUTANEOUS EVERY MORNING
Status: DISCONTINUED | OUTPATIENT
Start: 2023-01-04 | End: 2023-01-10 | Stop reason: HOSPADM

## 2023-01-03 RX ORDER — ASPIRIN 81 MG/1
81 TABLET, CHEWABLE ORAL DAILY
Status: DISCONTINUED | OUTPATIENT
Start: 2023-01-04 | End: 2023-01-10 | Stop reason: HOSPADM

## 2023-01-03 RX ORDER — ACETAMINOPHEN 325 MG/1
650 TABLET ORAL EVERY 6 HOURS PRN
Status: DISCONTINUED | OUTPATIENT
Start: 2023-01-03 | End: 2023-01-05

## 2023-01-03 RX ORDER — ACETAMINOPHEN 650 MG/1
650 SUPPOSITORY RECTAL EVERY 6 HOURS PRN
Status: DISCONTINUED | OUTPATIENT
Start: 2023-01-03 | End: 2023-01-05

## 2023-01-03 RX ORDER — ONDANSETRON 2 MG/ML
4 INJECTION INTRAMUSCULAR; INTRAVENOUS EVERY 6 HOURS PRN
Status: DISCONTINUED | OUTPATIENT
Start: 2023-01-03 | End: 2023-01-10 | Stop reason: HOSPADM

## 2023-01-03 RX ADMIN — WARFARIN SODIUM 5 MG: 5 TABLET ORAL at 21:27

## 2023-01-03 RX ADMIN — ONDANSETRON 4 MG: 2 INJECTION INTRAMUSCULAR; INTRAVENOUS at 15:53

## 2023-01-03 RX ADMIN — OXYCODONE 5 MG: 5 TABLET ORAL at 21:27

## 2023-01-03 RX ADMIN — Medication 10 ML: at 21:23

## 2023-01-03 RX ADMIN — SPIRONOLACTONE 25 MG: 25 TABLET ORAL at 21:27

## 2023-01-03 RX ADMIN — SODIUM CHLORIDE, POTASSIUM CHLORIDE, SODIUM LACTATE AND CALCIUM CHLORIDE: 600; 310; 30; 20 INJECTION, SOLUTION INTRAVENOUS at 21:23

## 2023-01-03 RX ADMIN — SODIUM CHLORIDE 500 ML: 9 INJECTION, SOLUTION INTRAVENOUS at 15:59

## 2023-01-03 ASSESSMENT — PAIN SCALES - GENERAL
PAINLEVEL_OUTOF10: 6
PAINLEVEL_OUTOF10: 7
PAINLEVEL_OUTOF10: 9

## 2023-01-03 ASSESSMENT — PAIN DESCRIPTION - ORIENTATION
ORIENTATION: LOWER;UPPER
ORIENTATION: LOWER;UPPER

## 2023-01-03 ASSESSMENT — PAIN DESCRIPTION - DESCRIPTORS
DESCRIPTORS: ACHING
DESCRIPTORS: ACHING

## 2023-01-03 ASSESSMENT — PAIN SCALES - WONG BAKER
WONGBAKER_NUMERICALRESPONSE: 6
WONGBAKER_NUMERICALRESPONSE: 6

## 2023-01-03 ASSESSMENT — PAIN DESCRIPTION - LOCATION
LOCATION: BACK
LOCATION: BACK

## 2023-01-03 ASSESSMENT — PAIN - FUNCTIONAL ASSESSMENT: PAIN_FUNCTIONAL_ASSESSMENT: NONE - DENIES PAIN

## 2023-01-03 NOTE — ED PROVIDER NOTES
I independently performed a history and physical on Panfilo English. All diagnostic, treatment, and disposition decisions were made by myself in conjunction with the advanced practice provider. For further details of [facility] emergency department encounter, please see MUNDO Capps's documentation. Patient reports that she has had vomiting diarrhea off and on for the last few weeks. Is neither black nor bloody. She denies any fevers. No chest pain. On exam she does have some mild abdominal tenderness but no rebound, rigidity or guarding. Heart tachycardic and irregularly irregular. Patient has some very slight dorsiflexion weakness of the right lower extremity but no other weakness in the right lower extremity. No other abnormal neurologic findings on exam.Awake, alert and oriented to person, place and time. Sensation is intact to light touch in the upper and lower extremities. Cranial Nerves 2-12 are intact. Patellar DTRs intact. Finger-to-nose intact. Heel-to-shin intact. Normal Romberg's test.      EKG  The Ekg interpreted by me shows  atrial fibrillation with a rate of 102  Axis is   Normal  QTc is  normal  Intervals and Durations are unremarkable.       ST Segments: no acute change  A. fib has replaced sinus rhythm from prior EKG dated 5/17/22    Labs Reviewed   CBC WITH AUTO DIFFERENTIAL - Abnormal; Notable for the following components:       Result Value    RDW 16.0 (*)     All other components within normal limits   COMPREHENSIVE METABOLIC PANEL W/ REFLEX TO MG FOR LOW K - Abnormal; Notable for the following components:    Sodium 133 (*)     Chloride 97 (*)     Glucose 123 (*)     BUN 60 (*)     Creatinine 2.4 (*)     Est, Glom Filt Rate 20 (*)     Albumin 3.1 (*)     Albumin/Globulin Ratio 0.9 (*)     All other components within normal limits   TROPONIN - Abnormal; Notable for the following components:    Troponin 0.04 (*)     All other components within normal limits PROTIME-INR - Abnormal; Notable for the following components:    Protime 22.3 (*)     INR 1.95 (*)     All other components within normal limits   C DIFF TOXIN/ANTIGEN   GASTROINTESTINAL PANEL, MOLECULAR   LIPASE   MAGNESIUM   LACTIC ACID   URINALYSIS WITH REFLEX TO CULTURE   CBC WITH AUTO DIFFERENTIAL   BASIC METABOLIC PANEL W/ REFLEX TO MG FOR LOW K   PROTIME-INR   VITAMIN B12 & FOLATE   POCT GLUCOSE   POCT GLUCOSE     CT HEAD WO CONTRAST   Final Result   1. No acute intracranial findings. 2. Chronic microvascular ischemic changes and prior infarcts involving the   left frontal, right parietal and right occipital lobes which appears similar   to the prior study. 3. Mucosal thickening in the right maxillary sinus. 4. Stable 1.2 cm right frontal meningioma. CT ABDOMEN PELVIS WO CONTRAST Additional Contrast? None   Final Result   1. 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 2. No acute findings elsewhere in the abdomen or pelvis. 3. Decreased size of a left ovarian cyst now measuring up to 7.6 cm versus   8.5 cm previously. Pelvic ultrasound could be performed for further   evaluation. XR CHEST PORTABLE   Final Result   Clear lungs. I personally saw this patient and performed a substantive portion of the visit including all aspects of the medical decision making. MDM  Given patient's normal white count and lactic acid levels I do not believe the pneumatosis is indicative of an invasive process. I do not believe that she has a surgical abdomen either. For this reason I feel she can be safely admitted by the hospitalist.  Her lower extremity weakness is very mild and does not have a clear timeline and I find it most likely is related to dehydration and poor nutrition secondary to her GI illness.     I personally saw this patient and independently provided 20 minutes of non-concurrent critical care out of the total shared critical care time provided.         Brandin Muniz MD  01/03/23 [id number]

## 2023-01-03 NOTE — TELEPHONE ENCOUNTER
Please call and schedule Patient for routine 1 year follow with Dr. Bianka Garcia with Device [facility]. Patient has not been seen by EP since 11/2021.  Thanks

## 2023-01-03 NOTE — ED NOTES
PT and daughter refused to follow my advice of not getting up and going to the bathroom. I offered purewick, bedpan and bedside commode and they refused. Daughter went and got her own wheelchair to take patient to the restroom.       Jani Sim RN  01/03/23 [id number]

## 2023-01-03 NOTE — TELEPHONE ENCOUNTER
Certainly if she is that week and having that profuse of diarrhea, she probably needs to go to the emergency room for an evaluation

## 2023-01-03 NOTE — TELEPHONE ENCOUNTER
Patient's daughter advised we recommend going to the ED. Patient's daughter is at the patients home. [address] [facility] ED that patient is on her way.

## 2023-01-03 NOTE — H&P
Had                                                                                                              HOSPITALISTS HISTORY AND PHYSICAL    1/3/2023 6:19 PM    Patient Information:  Juli Murphy is a 69 y.o. female [id number]  PCP:  Jere Malone MD (Tel: [phone] )    Chief complaint:    Chief Complaint   Patient presents with    Extremity Weakness     Pt states that she has been sick for 2 weeks, pt states that she has been having nausea vomiting since then. Pt states that her right leg is super weak and she can't take steps. Pt states that yesterday she kept part of a sandwich down and today some soup but hasn't kept anything down since the sickness started. History of Present Illness:  Manolo Richards is a 69 y.o. female having on and off diarrhea for the last 2 weeks patient has days where she has multiple episodes loose foul-smelling diarrhea and this resolved associate with nausea and vomiting and poor appetite no chest pain short breath fevers or chills patient recently antibiotics for cellulitis per the patient couple weeks ago no history of C. difficile. No travel or sick contacts. For the last day and a half patient also noticed numbness in her right foot with loss of balance and difficulty with dorsiflexion of the right foot. No headache or vision changes does have a prior history of stroke    REVIEW OF SYSTEMS:   Constitutional: Negative for fever,chills or night sweats  ENT: Negative for rhinorrhea, epistaxis, hoarseness, sore throat. Respiratory: Negative for shortness of breath,wheezing  Cardiovascular: Negative for chest pain, palpitations   Gastrointestinalsee above  Genitourinary: Negative for polyuria, dysuria   Hematologic/Lymphatic: Negative for bleeding tendency, easy bruising  Musculoskeletal: Negative for myalgias and arthralgias  Neurologic: Negative for confusion,dysarthria.   Skin: Negative for itching,rash  Psychiatric: Negative for depression,anxiety, agitation. Endocrine: Negative for polydipsia,polyuria,heat /cold intolerance. Past Medical History:   has a past medical history of Asthma, Atrial fibrillation ([address] Eosinophilia, Hemoptysis, HIGH CHOLESTEROL, Hx of blood clots, Hypertension, Irregular heart beat, Other specified gastritis without mention of hemorrhage, Palpitations, Skin cancer, and Type II or unspecified type diabetes mellitus without mention of complication, not stated as uncontrolled. Past Surgical History:   has a past surgical history that includes Cholecystectomy;  section; Colonoscopy (2017); skin biopsy; bronchoscopy (2016); Coronary artery bypass graft (2018); Mitral valve replacement (2018); transesophageal echocardiogram (2018); Tunneled venous catheter placement (Left, 2018); Cardiac catheterization (2018); Insertable Cardiac Monitor (Left, 2018); Colonoscopy (2014); Upper gastrointestinal endoscopy (N/A, 10/10/2018); Colonoscopy (10/10/2018); Colonoscopy (N/A, 2020); Pain management procedure (N/A, 2020); Pain management procedure (Bilateral, 2021); Cardiac catheterization ( and 5/3 2021); and IR KYPHOPLASTY THORACIC 1 VERTEBRAL BODY (2022). Medications:  No current facility-administered medications on file prior to encounter.      Current Outpatient Medications on File Prior to Encounter   Medication Sig Dispense Refill    phytonadione (VITAMIN K) 5 MG tablet Take 1 tablet by mouth once for 1 dose 1 tablet 0    nystatin (MYCOSTATIN) 192726 UNIT/ML suspension Take 5 mLs by mouth 4 times daily 100 mL 0    vitamin D (CHOLECALCIFEROL) 25 MCG (1000 UT) TABS tablet Take 1,000 Units by mouth Six times weekly      insulin glargine (LANTUS SOLOSTAR) 100 UNIT/ML injection pen Inject 16 Units into the skin every morning 90 mL 1    oxyCODONE (ROXICODONE) 5 MG immediate release tablet Take 1 tablet by mouth 3 times daily as needed for Pain for up to 30 days.  90 tablet 0    levothyroxine (SYNTHROID) 100 MCG tablet TAKE 1 TABLET DAILY 90 tablet 3    midodrine (PROAMATINE) 2.5 MG tablet Take 1 tablet by mouth 3 times daily 90 tablet 2    vitamin D (ERGOCALCIFEROL) 1.25 MG (12665 UT) CAPS capsule TAKE ONE CAPSULE BY MOUTH ONCE WEEKLY 12 capsule 1    calcitRIOL (ROCALTROL) 0.25 MCG capsule 1 tablet Monday, Wednesday and Friday 30 capsule 3    predniSONE (DELTASONE) 10 MG tablet TAKE 1 TABLET AS NEEDED (EOSINOPHILIC GASATRITIS) 259 tablet 1    spironolactone (ALDACTONE) 25 MG tablet TAKE 1 TABLET TWICE A  tablet 1    blood glucose test strips (FREESTYLE LITE) strip USE TO TEST BLOOD SUGAR FOUR TIMES A  strip 3    torsemide (DEMADEX) 20 MG tablet TAKE 2 TABLETS TWICE A  tablet 3    warfarin (COUMADIN) 5 MG tablet TAKE 1 TABLET DAILY 100 tablet 3    potassium chloride (KLOR-CON M) 10 MEQ extended release tablet TAKE 1 TABLET DAILY 90 tablet 1    metoprolol succinate (TOPROL XL) 50 MG extended release tablet TAKE 1 TABLET DAILY 90 tablet 1    montelukast (SINGULAIR) 10 MG tablet TAKE 1 TABLET NIGHTLY 90 tablet 1    insulin lispro, 1 Unit Dial, (HUMALOG KWIKPEN) 100 UNIT/ML SOPN USE SLIDING SCALE, 140-199, 2 UNITS, 200-249, 3 UNITS, 250-300, 4 UNITS, GREATER THAN 300, INJECT 5 UNITS 15 mL 2    metOLazone (ZAROXOLYN) 2.5 MG tablet TAKE 1 TABLET ON TUESDAY AND FRIDAY AND AS DIRECTED 30 tablet 1    amiodarone (CORDARONE) 200 MG tablet TAKE 1 TABLET DAILY (Patient taking differently: Take 100 mg by mouth daily) 60 tablet 5    albuterol sulfate  (90 Base) MCG/ACT inhaler USE 2 INHALATIONS EVERY 6 HOURS AS NEEDED FOR WHEEZING 25.5 g 2    ipratropium-albuterol (DUONEB) 0.5-2.5 (3) MG/3ML SOLN nebulizer solution Inhale 3 mLs into the lungs every 4 hours as needed for Shortness of Breath 120 each 0    FreeStyle Lancets MISC 1 each by Does not apply route 4 times daily 200 each 5    Blood Glucose Monitoring Suppl (FREESTYLE LITE) GAETANO 1 Device by Does not apply route 4 times daily      Insulin Pen Needle (BD PEN NEEDLE JANNET U/F) 32G X 4 MM MISC USE 1 PEN NEEDLE FOUR TIMES A  each 3    magnesium oxide (MAG-OX) 400 MG tablet Take 1 tablet by mouth daily 90 tablet 3    ACETAMINOPHEN PO Take 500 mg by mouth every 6 hours as needed       albuterol (PROVENTIL) (2.5 MG/3ML) 0.083% nebulizer solution Take 3 mLs by nebulization every 6 hours as needed for Wheezing 120 each 3    polyethylene glycol (GLYCOLAX) 17 GM/SCOOP powder Take 17 g by mouth daily       omeprazole (PRILOSEC) 20 MG delayed release capsule Take 20 mg by mouth daily      ondansetron (ZOFRAN) 4 MG tablet Take 1 tablet by mouth 3 times daily as needed for Nausea or Vomiting 30 tablet 0    aspirin 81 MG chewable tablet Take 1 tablet by mouth daily 30 tablet 3    vitamin B-12 500 MCG tablet Take 1 tablet by mouth daily 30 tablet 3       Allergies: Allergies   Allergen Reactions    Lgyfstmn-Aysnnoo-Sfxkgl [Fluocinolone] Shortness Of Breath    Ciprofloxacin Shortness Of Breath    Diovan [Valsartan] Shortness Of Breath    Flagyl [Metronidazole] Shortness Of Breath     Has taken diflucan at home 12/7/15    Metformin And Related [Metformin And Related] Shortness Of Breath    Benazepril      Other reaction(s): Not Recorded    Morphine      Bad reaction. \"makes her feel horrible\". Saxagliptin      Other reaction(s): Not Recorded    Levaquin [Levofloxacin] Rash        Social History:  Patient Lives at home   reports that she has never smoked. She has never used smokeless tobacco. She reports that she does not drink alcohol and does not use drugs. Family History:  family history includes Asthma in her mother; Cancer in her father; Heart Disease in her mother; High Blood Pressure in her mother; Hypertension in her mother. ,     Physical Exam:  /87   Pulse 93   Temp 98.2 °F (36.8 °C)   Resp 16   SpO2 97%     General appearance:  Appears comfortable.  AAOx3  HEENT: atraumatic, Pupils equal, muscous membranes moist, no masses appreciated  Cardiovascular: irregulary irregular no murmurs appreciated  Respiratory: CTAB no wheezing  Gastrointestinal: Abdomen soft, non-tender, BS+  EXT: no edema  Neurology external ocular muscles intact face symmetric tongue midline strength 5 out of 5 in all extremities except 3 out of 5 with dorsiflexion of the right foot  Psychiatry: Appropriate affect. Not agitated  Skin: Warm, dry, no rashes appreciated    Labs:  CBC:   Lab Results   Component Value Date/Time    WBC 6.3 01/03/2023 01:57 PM    RBC 4.45 01/03/2023 01:57 PM    HGB 13.2 01/03/2023 01:57 PM    HCT 40.9 01/03/2023 01:57 PM    MCV 92.0 01/03/2023 01:57 PM    MCH 29.8 01/03/2023 01:57 PM    MCHC 32.3 01/03/2023 01:57 PM    RDW 16.0 01/03/2023 01:57 PM     01/03/2023 01:57 PM    MPV 8.6 01/03/2023 01:57 PM     BMP:    Lab Results   Component Value Date/Time     01/03/2023 01:57 PM    K 4.6 01/03/2023 01:57 PM    CL 97 01/03/2023 01:57 PM    CO2 23 01/03/2023 01:57 PM    BUN 60 01/03/2023 01:57 PM    CREATININE 2.4 01/03/2023 01:57 PM    CALCIUM 8.9 01/03/2023 01:57 PM    GFRAA 31 09/06/2022 08:49 AM    LABGLOM 20 01/03/2023 01:57 PM    GLUCOSE 123 01/03/2023 01:57 PM     CT ABDOMEN PELVIS WO CONTRAST Additional Contrast? None   Final Result   1. 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 2. No acute findings elsewhere in the abdomen or pelvis. 3. Decreased size of a left ovarian cyst now measuring up to 7.6 cm versus   8.5 cm previously. Pelvic ultrasound could be performed for further   evaluation. XR CHEST PORTABLE   Final Result   Clear lungs. CT HEAD WO CONTRAST    (Results Pending)       Recent imaging reviewed    Problem List  Principal Problem:    TORIBIO (acute kidney injury) ([address]  Resolved Problems:    * No resolved hospital problems.  *        Assessment/Plan:   N/v diarreha intermittent  - check c diff gi pcr  - gi consult    Joey on cdk3: ivf x 12 hours and repeat labs    Right foot numbness and weakness with dorsiflexsion   - get ct head  - recent kyphoplastty if no improvement consider mri spine and mri brain  - no diffuclty with stools or urination    PAF home meds and coumadin pharmacy to dose    Dm2 lantus and lispro    Elevated troponin 0.04 at prio baseline no chest pain    DVT prophylaxis coumadin  Code status full code        Admit as inpatient I anticipate hospitalization spanning more than two midnights for investigation and treatment of the above medically necessary diagnoses. Please note that some part of this chart was generated using Dragon dictation software. Although every effort was made to ensure the accuracy of this automated transcription, some errors in transcription may have occurred inadvertently. If you may need any clarification, please do not hesitate to contact me through [facility].        Sharona Gonzalez MD    1/3/2023 6:19 PM

## 2023-01-03 NOTE — TELEPHONE ENCOUNTER
----- Message from Angelo Drew sent at 1/3/2023  8:20 AM EST -----  Subject: Referral Request    Reason for referral request? PT is requesting routine labs. Provider patient wants to be referred to(if known):     Provider Phone Number(if known): Additional Information for Provider? Pt has an appointment tomorrow   01/04/23, wants to know if she is needing labs prior to appointment.    Please advise.   ---------------------------------------------------------------------------  --------------  Maria Teresa Brand [facility]    [phone]; OK to leave message on voicemail  ---------------------------------------------------------------------------  --------------

## 2023-01-03 NOTE — TELEPHONE ENCOUNTER
Patient states they can't get out to go to the ED. Their furnace is messed up and they are unable to get themselves out of the house.

## 2023-01-03 NOTE — ED PROVIDER NOTES
[address]        Pt Name: Natalee Ramirez  MRN: [id number]  [place] 1945  Date of evaluation: 1/3/2023  Provider: Kevyn Amaya PA-C  PCP: Elder Hoskins MD  Note Started: 2:10 PM EST 1/3/23       I have seen and evaluated this patient with my supervising physician Karolina Dang MD.      [address]       Chief Complaint   Patient presents with    Extremity Weakness     Pt states that she has been sick for 2 weeks, pt states that she has been having nausea vomiting since then. Pt states that her right leg is super weak and she can't take steps. Pt states that yesterday she kept part of a sandwich down and today some soup but hasn't kept anything down since the sickness started. HISTORY OF PRESENT ILLNESS: 1 or more Elements     History From: patient  Limitations to history : None    Natalee Ramirez is a 69 y.o. female who presents to the emergency department with a chief complaint of general weakness. This began about 2 weeks ago with her just generally not feeling well with some off-and-on nausea vomiting and diarrhea. Denies any bloody emesis or bloody diarrhea. The middle of the night she states she began to notice numbness and tingling in her right foot but denies difficulty moving her extremities. Denies headache, visual changes, chest pain, shortness of breath, fevers, abdominal pain, dysuria, hematuria or any other symptoms. Nursing Notes were all reviewed and agreed with or any disagreements were addressed in the HPI. REVIEW OF SYSTEMS :      Review of Systems    Positives and Pertinent negatives as per HPI.      SURGICAL HISTORY     Past Surgical History:   Procedure Laterality Date    BRONCHOSCOPY  2016    Dr. Winfield Curling - brushings from [address] [place] [address]  2018    Dr. Sanjeev Bonner and 5/3 2021    Cardiac cath, cardioversion and rafta     SECTION CHOLECYSTECTOMY      COLONOSCOPY  02/07/2017    Dr. Stephie Bassett - sigmoid diverticulosis, polypectomies x3    COLONOSCOPY  01/17/2014    Dr. Card Ramus - sigmoid diverticulosis, polypectomies x3, internal hemorrhoids    COLONOSCOPY  10/10/2018    w/biopsy performed by Dolly Menendez MD at [facility] N/A 8/7/2020    COLONOSCOPY DIAGNOSTIC performed by Kamaljit Zhang MD at [facility]  08/21/2018    Dr. Amparo Vergara - x3 (LIMA-LAD, L SV-D1-PLV) modified BL MAZE procedure w/obliteration of WAYNE using 45mm AtriClip    INSERTABLE CARDIAC MONITOR Left 08/16/2018    Dr. Lynnette Feng SN# [id number] [employer]    IR KYPHOPLASTY THORACIC FIRST LEVEL  12/8/2022    IR KYPHOPLASTY THORACIC FIRST LEVEL 12/8/2022 [facility] SPECIAL PROCEDURES    MITRAL VALVE REPLACEMENT  08/21/2018    Dr. Amparo Vergara - 27mm [employer] Cinch tissue valve    PAIN MANAGEMENT PROCEDURE N/A 12/18/2020    C6-C7 MIDLINE  EPIDURAL STEROID INJECTION WITH FLUOROSCOPY performed by Don Mcdaniels MD at [address] [place] [address] Bilateral 1/18/2021    BILATERAL T11 TRANSFORAMINAL EPIDURAL STEROID INJECTION WITH FLUOROSCOPY performed by Don Mcdaniels MD at [facility]      TRANSESOPHAGEAL ECHOCARDIOGRAM  08/21/2018    during CABG/MVR    TUNNELED VENOUS CATHETER PLACEMENT Left 08/23/2018    Dr. Odilon Morgan -  for HD---since removed    UPPER GASTROINTESTINAL ENDOSCOPY N/A 10/10/2018    w/biopsy performed by Dolly Menendez MD at [address] [place] [address]       Current Discharge Medication List        CONTINUE these medications which have NOT CHANGED    Details   phytonadione (VITAMIN K) 5 MG tablet Take 1 tablet by mouth once for 1 dose  Qty: 1 tablet, Refills: 0    Associated Diagnoses: Supratherapeutic INR      nystatin (MYCOSTATIN) 936103 UNIT/ML suspension Take 5 mLs by mouth 4 times daily  Qty: 100 mL, Refills: 0    Associated Diagnoses:  Thrush, oral      vitamin D (CHOLECALCIFEROL) 25 MCG (1000 UT) TABS tablet Take 1,000 Units by mouth Six times weekly      insulin glargine (LANTUS SOLOSTAR) 100 UNIT/ML injection pen Inject 16 Units into the skin every morning  Qty: 90 mL, Refills: 1      oxyCODONE (ROXICODONE) 5 MG immediate release tablet Take 1 tablet by mouth 3 times daily as needed for Pain for up to 30 days. Qty: 90 tablet, Refills: 0    Comments: Reduce doses taken as pain becomes manageable  Associated Diagnoses: Primary osteoarthritis of both knees      levothyroxine (SYNTHROID) 100 MCG tablet TAKE 1 TABLET DAILY  Qty: 90 tablet, Refills: 3      midodrine (PROAMATINE) 2.5 MG tablet Take 1 tablet by mouth 3 times daily  Qty: 90 tablet, Refills: 2      vitamin D (ERGOCALCIFEROL) 1.25 MG (97541 UT) CAPS capsule TAKE ONE CAPSULE BY MOUTH ONCE WEEKLY  Qty: 12 capsule, Refills: 1      calcitRIOL (ROCALTROL) 0.25 MCG capsule 1 tablet Monday, Wednesday and Friday  Qty: 30 capsule, Refills: 3      predniSONE (DELTASONE) 10 MG tablet TAKE 1 TABLET AS NEEDED (EOSINOPHILIC GASATRITIS)  Qty: 100 tablet, Refills: 1    Comments: YOUR PATIENT HAS REQUESTED A REFILL OF THIS MEDICATION, PREVIOUSLY AUTHORIZED BY ANOTHER PRESCRIBER.       spironolactone (ALDACTONE) 25 MG tablet TAKE 1 TABLET TWICE A DAY  Qty: 180 tablet, Refills: 1      blood glucose test strips (FREESTYLE LITE) strip USE TO TEST BLOOD SUGAR FOUR TIMES A DAY  Qty: 200 strip, Refills: 3    Associated Diagnoses: Diabetes mellitus type 2 in obese (HCC)      torsemide (DEMADEX) 20 MG tablet TAKE 2 TABLETS TWICE A DAY  Qty: 360 tablet, Refills: 3    Associated Diagnoses: Chronic systolic heart failure due to valvular disease (HCC)      warfarin (COUMADIN) 5 MG tablet TAKE 1 TABLET DAILY  Qty: 100 tablet, Refills: 3      potassium chloride (KLOR-CON M) 10 MEQ extended release tablet TAKE 1 TABLET DAILY  Qty: 90 tablet, Refills: 1      metoprolol succinate (TOPROL XL) 50 MG extended release tablet TAKE 1 TABLET DAILY  Qty: 90 tablet, Refills: 1      montelukast (SINGULAIR) 10 MG tablet TAKE 1 TABLET NIGHTLY  Qty: 90 tablet, Refills: 1      insulin lispro, 1 Unit Dial, (HUMALOG KWIKPEN) 100 UNIT/ML SOPN USE SLIDING SCALE, 140-199, 2 UNITS, 200-249, 3 UNITS, 250-300, 4 UNITS, GREATER THAN 300, INJECT 5 UNITS  Qty: 15 mL, Refills: 2    Associated Diagnoses: Type 2 diabetes mellitus with stage 3b chronic kidney disease, with long-term current use of insulin ([facility])      metOLazone (ZAROXOLYN) 2.5 MG tablet TAKE 1 TABLET ON TUESDAY AND FRIDAY AND AS DIRECTED  Qty: 30 tablet, Refills: 1      amiodarone (CORDARONE) 200 MG tablet TAKE 1 TABLET DAILY  Qty: 60 tablet, Refills: 5      albuterol sulfate  (90 Base) MCG/ACT inhaler USE 2 INHALATIONS EVERY 6 HOURS AS NEEDED FOR WHEEZING  Qty: 25.5 g, Refills: 2      ipratropium-albuterol (DUONEB) 0.5-2.5 (3) MG/3ML SOLN nebulizer solution Inhale 3 mLs into the lungs every 4 hours as needed for Shortness of Breath  Qty: 120 each, Refills: 0      FreeStyle Lancets MISC 1 each by Does not apply route 4 times daily  Qty: 200 each, Refills: 5    Comments: Patient to check blood sugar 4 times a day and PRN, she is treated with multiple daily injections of insulin that require correction dosing ;A1C 8.1 ; ICD code-E11.65 ,Z79.4  Associated Diagnoses: Diabetes mellitus type 2 in obese ([facility])      Blood Glucose Monitoring Suppl (FREESTYLE LITE) GAETANO 1 Device by Does not apply route 4 times daily      Insulin Pen Needle (BD PEN NEEDLE JANNET U/F) 32G X 4 MM MISC USE 1 PEN NEEDLE FOUR TIMES A DAY  Qty: 360 each, Refills: 3    Associated Diagnoses: Diabetes mellitus type 2 in obese ([facility])      magnesium oxide (MAG-OX) 400 MG tablet Take 1 tablet by mouth daily  Qty: 90 tablet, Refills: 3      ACETAMINOPHEN PO Take 500 mg by mouth every 6 hours as needed       albuterol (PROVENTIL) (2.5 MG/3ML) 0.083% nebulizer solution Take 3 mLs by nebulization every 6 hours as needed for Wheezing  Qty: 120 each, Refills: 3 polyethylene glycol (GLYCOLAX) 17 GM/SCOOP powder Take 17 g by mouth daily       omeprazole (PRILOSEC) 20 MG delayed release capsule Take 20 mg by mouth daily      ondansetron (ZOFRAN) 4 MG tablet Take 1 tablet by mouth 3 times daily as needed for Nausea or Vomiting  Qty: 30 tablet, Refills: 0      aspirin 81 MG chewable tablet Take 1 tablet by mouth daily  Qty: 30 tablet, Refills: 3      vitamin B-12 500 MCG tablet Take 1 tablet by mouth daily  Qty: 30 tablet, Refills: 3             ALLERGIES     Ymyizsvc-rvcjste-lsvuyf [fluocinolone], Ciprofloxacin, Diovan [valsartan], Flagyl [metronidazole], Metformin and related [metformin and related], Benazepril, Morphine, Saxagliptin, and Levaquin [levofloxacin]    FAMILYHISTORY       Family History   Problem Relation Age of Onset    Cancer Father     Asthma Mother     Hypertension Mother     Heart Disease Mother     High Blood Pressure Mother         SOCIAL HISTORY       Social History     Tobacco Use    Smoking status: Never    Smokeless tobacco: Never   Vaping Use    Vaping Use: Never used   Substance Use Topics    Alcohol use: No    Drug use: No       SCREENINGS        [place] Coma Scale  Eye Opening: Spontaneous  Best Verbal Response: Oriented  Best Motor Response: Obeys commands  [place] Coma Scale Score: 15                CIWA Assessment  BP: 104/76  Heart Rate: 89           PHYSICAL EXAM  1 or more Elements     ED Triage Vitals   BP Temp Temp Source Heart Rate Resp SpO2 Height Weight   01/03/23 1327 01/03/23 1331 01/03/23 1327 01/03/23 1327 01/03/23 1327 01/03/23 1327 -- --   114/88 98.2 °F (36.8 °C) Oral 98 20 98 %         Physical Exam  Vitals and nursing note reviewed. Constitutional:       Appearance: She is well-developed. She is not diaphoretic. HENT:      Head: Atraumatic. Nose: Nose normal.   Eyes:      General:         Right eye: No discharge. Left eye: No discharge. Cardiovascular:      Rate and Rhythm: Normal rate and regular rhythm. Heart sounds: No murmur heard. No friction rub. No gallop. Pulmonary:      Effort: Pulmonary effort is normal. No respiratory distress. Breath sounds: No stridor. No wheezing, rhonchi or rales. Abdominal:      General: Bowel sounds are normal. There is no distension. Palpations: Abdomen is soft. There is no mass. Tenderness: There is no abdominal tenderness. There is no guarding or rebound. Hernia: No hernia is present. Musculoskeletal:         General: No swelling. Normal range of motion. Cervical back: Normal range of motion. Skin:     General: Skin is warm and dry. Findings: No erythema or rash. Neurological:      Mental Status: She is alert and oriented to person, place, and time. Cranial Nerves: No cranial nerve deficit.    Psychiatric:         Behavior: Behavior normal.           DIAGNOSTIC RESULTS   LABS:    Labs Reviewed   CBC WITH AUTO DIFFERENTIAL - Abnormal; Notable for the following components:       Result Value    RDW 16.0 (*)     All other components within normal limits   COMPREHENSIVE METABOLIC PANEL W/ REFLEX TO MG FOR LOW K - Abnormal; Notable for the following components:    Sodium 133 (*)     Chloride 97 (*)     Glucose 123 (*)     BUN 60 (*)     Creatinine 2.4 (*)     Est, Glom Filt Rate 20 (*)     Albumin 3.1 (*)     Albumin/Globulin Ratio 0.9 (*)     All other components within normal limits   TROPONIN - Abnormal; Notable for the following components:    Troponin 0.04 (*)     All other components within normal limits   PROTIME-INR - Abnormal; Notable for the following components:    Protime 22.3 (*)     INR 1.95 (*)     All other components within normal limits   URINALYSIS WITH REFLEX TO CULTURE - Abnormal; Notable for the following components:    Clarity, UA CLOUDY (*)     Leukocyte Esterase, Urine SMALL (*)     All other components within normal limits   CBC WITH AUTO DIFFERENTIAL - Abnormal; Notable for the following components:    RBC 3.97 (*)     RDW 15.7 (*)     Lymphocytes Absolute 0.9 (*)     All other components within normal limits   BASIC METABOLIC PANEL W/ REFLEX TO MG FOR LOW K - Abnormal; Notable for the following components:    Sodium 133 (*)     BUN 55 (*)     Creatinine 2.2 (*)     Est, Glom Filt Rate 23 (*)     All other components within normal limits   PROTIME-INR - Abnormal; Notable for the following components:    Protime 25.7 (*)     INR 2.34 (*)     All other components within normal limits   MICROSCOPIC URINALYSIS - Abnormal; Notable for the following components:    Hyaline Casts, UA 9 (*)     Hyaline Casts, UA Present (*)     All other components within normal limits   POCT GLUCOSE - Abnormal; Notable for the following components:    POC Glucose 185 (*)     All other components within normal limits   POCT GLUCOSE - Abnormal; Notable for the following components:    POC Glucose 132 (*)     All other components within normal limits   C DIFF TOXIN/ANTIGEN   GASTROINTESTINAL PANEL, MOLECULAR   LIPASE   MAGNESIUM   LACTIC ACID   VITAMIN B12 & FOLATE   MAGNESIUM   O&P SCREEN(CRYPTOSPORIDIUM/GIARDIA/E. HISTOLYTICA) #1   POCT GLUCOSE   POCT GLUCOSE   POCT GLUCOSE   POCT GLUCOSE   POCT GLUCOSE   POCT GLUCOSE       When ordered only abnormal lab results are displayed. All other labs were within normal range or not returned as of this dictation. EKG: When ordered, EKG's are interpreted by the Emergency Department Physician in the absence of a cardiologist.  Please see their note for interpretation of EKG. RADIOLOGY:   Non-plain film images such as CT, Ultrasound and MRI are read by the radiologist. Plain radiographic images are visualized and preliminarily interpreted by the ED Provider with the below findings:        Interpretation per the Radiologist below, if available at the time of this note:    CT HEAD WO CONTRAST   Final Result   1. No acute intracranial findings.    2. Chronic microvascular ischemic changes and prior infarcts involving the   left frontal, right parietal and right occipital lobes which appears similar   to the prior study. 3. Mucosal thickening in the right maxillary sinus. 4. Stable 1.2 cm right frontal meningioma. CT ABDOMEN PELVIS WO CONTRAST Additional Contrast? None   Final Result   1. 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 2. No acute findings elsewhere in the abdomen or pelvis. 3. Decreased size of a left ovarian cyst now measuring up to 7.6 cm versus   8.5 cm previously. Pelvic ultrasound could be performed for further   evaluation. XR CHEST PORTABLE   Final Result   Clear lungs. No results found. No results found. PROCEDURES   Unless otherwise noted below, none     Procedures    CRITICAL CARE TIME (.cctime)       PAST MEDICAL HISTORY      has a past medical history of Asthma, Atrial fibrillation ([address] Eosinophilia, Hemoptysis, HIGH CHOLESTEROL, blood clots, Hypertension, Irregular heart beat, Other specified gastritis without mention of hemorrhage, Palpitations, Skin cancer, and Type II or unspecified type diabetes mellitus without mention of complication, not stated as uncontrolled.      EMERGENCY DEPARTMENT COURSE and DIFFERENTIAL DIAGNOSIS/MDM:   Vitals:    Vitals:    01/04/23 0356 01/04/23 0900 01/04/23 0952 01/04/23 1142   BP:  102/67  104/76   Pulse:  (!) 101  89   Resp: 18 18 18 18   Temp:  98.1 °F (36.7 °C)  98.1 °F (36.7 °C)   TempSrc:  Oral  Oral   SpO2:  94%  96%   Weight:       Height:           Patient was given the following medications:  Medications   albuterol (PROVENTIL) nebulizer solution 2.5 mg (has no administration in time range)   amiodarone (CORDARONE) tablet 100 mg (100 mg Oral Given 1/4/23 0921)   aspirin chewable tablet 81 mg (81 mg Oral Given 1/4/23 0922)   calcitRIOL (ROCALTROL) capsule 0.25 mcg (0.25 mcg Oral Given 1/4/23 0921)   insulin glargine (LANTUS) injection vial 16 Units (16 Units SubCUTAneous Given 1/4/23 0926)   insulin lispro (HUMALOG) injection vial 0-8 Units (0 Units SubCUTAneous Not Given 1/4/23 1258)   insulin lispro (HUMALOG) injection vial 0-4 Units (0 Units SubCUTAneous Not Given 1/3/23 2124)   lactated ringers infusion ( IntraVENous New Bag 1/3/23 2123)   ipratropium-albuterol (DUONEB) nebulizer solution 1 ampule (has no administration in time range)   levothyroxine (SYNTHROID) tablet 100 mcg (100 mcg Oral Given 1/4/23 0641)   metoprolol succinate (TOPROL XL) extended release tablet 50 mg (50 mg Oral Given 1/4/23 0921)   midodrine (PROAMATINE) tablet 2.5 mg (2.5 mg Oral Given 1/4/23 1335)   pantoprazole (PROTONIX) tablet 40 mg (40 mg Oral Given 1/4/23 0641)   sodium chloride flush 0.9 % injection 5-40 mL (10 mLs IntraVENous Given 1/4/23 1334)   sodium chloride flush 0.9 % injection 5-40 mL (has no administration in time range)   0.9 % sodium chloride infusion (has no administration in time range)   ondansetron (ZOFRAN-ODT) disintegrating tablet 4 mg (has no administration in time range)     Or   ondansetron (ZOFRAN) injection 4 mg (has no administration in time range)   polyethylene glycol (GLYCOLAX) packet 17 g (has no administration in time range)   acetaminophen (TYLENOL) tablet 650 mg (has no administration in time range)     Or   acetaminophen (TYLENOL) suppository 650 mg (has no administration in time range)   warfarin (COUMADIN) tablet 2.5 mg (has no administration in time range)   warfarin (COUMADIN) tablet 5 mg (5 mg Oral Given 1/3/23 2127)   dextrose bolus 10% 125 mL (has no administration in time range)     Or   dextrose bolus 10% 250 mL (has no administration in time range)   glucagon (rDNA) injection 1 mg (has no administration in time range)   dextrose 10 % infusion (has no administration in time range)   oxyCODONE (ROXICODONE) immediate release tablet 5 mg (5 mg Oral Given 1/4/23 0922)   0.9 % sodium chloride bolus (0 mLs IntraVENous Stopped 1/3/23 1700) Is this patient to be included in the SEP-1 Core Measure due to severe sepsis or septic shock? No   Exclusion criteria - the patient is NOT to be included for SEP-1 Core Measure due to: Infection is not suspected    Chronic Conditions affecting care:    has a past medical history of Asthma, Atrial fibrillation ([address] Eosinophilia, Hemoptysis, HIGH CHOLESTEROL, blood clots, Hypertension, Irregular heart beat, Other specified gastritis without mention of hemorrhage, Palpitations, Skin cancer, and Type II or unspecified type diabetes mellitus without mention of complication, not stated as uncontrolled. 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 CC/HPI Summary, DDx, ED Course, and Reassessment: Patient presented with intermittent nausea vomiting, diarrhea. Had some numbness and weakness of the right ankle hemoglobin 8. She is anticoagulated on Coumadin. I have low suspicion for CVA, TIA. Not a TNKase candidate anyways with only minimal symptoms only affecting her right ankle. She did develop some abdominal discomfort here with some soreness. Has mildly elevated creatinine from her baseline. 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 Low suspicion for mesenteric ischemia, ischemic bowel, obstruction, perforated viscus or other emergent etiology. Given her multiple issues discussed with hospitalist will admit for further work-up and treatment.     Disposition Considerations (tests considered but not done, Admit vs D/C, Shared Decision Making, Pt Expectation of Test or Tx.):        I am the Primary Clinician of Record. FINAL IMPRESSION      1. Nausea vomiting and diarrhea    2. Generalized abdominal pain    3. Right leg weakness    4. Anticoagulated on Coumadin    5. Elevated serum creatinine          DISPOSITION/PLAN     DISPOSITION Admitted 01/03/2023 06:19:29 PM      PATIENT REFERRED TO:  No follow-up provider specified.     DISCHARGE MEDICATIONS:  Current Discharge Medication List          DISCONTINUED MEDICATIONS:  Current Discharge Medication List                 (Please note that portions of this note were completed with a voice recognition program.  Efforts were made to edit the dictations but occasionally words are mis-transcribed.)    Jerod Palencia PA-C (electronically signed)       Jerod Palencia PA-C  01/04/23 1159

## 2023-01-03 NOTE — TELEPHONE ENCOUNTER
She is not eating very well and she has been drinking fluids. She has had diarrhea on and off for about 3 weeks. She is not walking very well and feeling weak.

## 2023-01-03 NOTE — TELEPHONE ENCOUNTER
Daughter called concerned about patient     Patient does have an upcoming appt tomorrow . Daughter would like to stress that her mother has been not feeling well for almost 2 weeks. Constant Diarrhea and unable to keep things down. She has been vomiting off and on for almost 2 weeks as well . [place] Stands More this week than last     Patient is very weak  . Daughter is leaving work and heading there . She would like to know . . if we can possibly call Jessica Lincoln and convince her to possibly go to the ER to at least get some fluids. As she is not even able to walk to day , she is very weak and fragile.      Johnson Apley ) [phone]  PATIENT ( Iveth Estimable    Thank You   Dimitrios Stock

## 2023-01-04 LAB
ANION GAP SERPL CALCULATED.3IONS-SCNC: 9 MMOL/L (ref 3–16)
BASOPHILS ABSOLUTE: 0 K/UL (ref 0–0.2)
BASOPHILS RELATIVE PERCENT: 0.6 %
BUN BLDV-MCNC: 55 MG/DL (ref 7–20)
CALCIUM SERPL-MCNC: 8.7 MG/DL (ref 8.3–10.6)
CHLORIDE BLD-SCNC: 100 MMOL/L (ref 99–110)
CO2: 24 MMOL/L (ref 21–32)
CREAT SERPL-MCNC: 2.2 MG/DL (ref 0.6–1.2)
EOSINOPHILS ABSOLUTE: 0.2 K/UL (ref 0–0.6)
EOSINOPHILS RELATIVE PERCENT: 3.6 %
FOLATE: 10.51 NG/ML (ref 4.78–24.2)
GFR SERPL CREATININE-BSD FRML MDRD: 23 ML/MIN/{1.73_M2}
GLUCOSE BLD-MCNC: 120 MG/DL (ref 70–99)
GLUCOSE BLD-MCNC: 132 MG/DL (ref 70–99)
GLUCOSE BLD-MCNC: 144 MG/DL (ref 70–99)
GLUCOSE BLD-MCNC: 185 MG/DL (ref 70–99)
GLUCOSE BLD-MCNC: 75 MG/DL (ref 70–99)
HCT VFR BLD CALC: 36.5 % (ref 36–48)
HEMOGLOBIN: 12 G/DL (ref 12–16)
INR BLD: 2.34 (ref 0.87–1.14)
LACTIC ACID: 1.6 MMOL/L (ref 0.4–2)
LYMPHOCYTES ABSOLUTE: 0.9 K/UL (ref 1–5.1)
LYMPHOCYTES RELATIVE PERCENT: 17.8 %
MAGNESIUM: 2.2 MG/DL (ref 1.8–2.4)
MCH RBC QN AUTO: 30.2 PG (ref 26–34)
MCHC RBC AUTO-ENTMCNC: 32.8 G/DL (ref 31–36)
MCV RBC AUTO: 92 FL (ref 80–100)
MONOCYTES ABSOLUTE: 0.7 K/UL (ref 0–1.3)
MONOCYTES RELATIVE PERCENT: 14.2 %
NEUTROPHILS ABSOLUTE: 3.3 K/UL (ref 1.7–7.7)
NEUTROPHILS RELATIVE PERCENT: 63.8 %
PDW BLD-RTO: 15.7 % (ref 12.4–15.4)
PERFORMED ON: ABNORMAL
PLATELET # BLD: 162 K/UL (ref 135–450)
PMV BLD AUTO: 8.8 FL (ref 5–10.5)
POTASSIUM REFLEX MAGNESIUM: 3.5 MMOL/L (ref 3.5–5.1)
PROTHROMBIN TIME: 25.7 SEC (ref 11.7–14.5)
RBC # BLD: 3.97 M/UL (ref 4–5.2)
SODIUM BLD-SCNC: 133 MMOL/L (ref 136–145)
VITAMIN B-12: 733 PG/ML (ref 211–911)
WBC # BLD: 5.2 K/UL (ref 4–11)

## 2023-01-04 PROCEDURE — 97530 THERAPEUTIC ACTIVITIES: CPT

## 2023-01-04 PROCEDURE — 97161 PT EVAL LOW COMPLEX 20 MIN: CPT

## 2023-01-04 PROCEDURE — 97116 GAIT TRAINING THERAPY: CPT

## 2023-01-04 PROCEDURE — 6370000000 HC RX 637 (ALT 250 FOR IP): Performed by: INTERNAL MEDICINE

## 2023-01-04 PROCEDURE — 80048 BASIC METABOLIC PNL TOTAL CA: CPT

## 2023-01-04 PROCEDURE — 2580000003 HC RX 258: Performed by: INTERNAL MEDICINE

## 2023-01-04 PROCEDURE — 6370000000 HC RX 637 (ALT 250 FOR IP): Performed by: NURSE PRACTITIONER

## 2023-01-04 PROCEDURE — 83605 ASSAY OF LACTIC ACID: CPT

## 2023-01-04 PROCEDURE — 85610 PROTHROMBIN TIME: CPT

## 2023-01-04 PROCEDURE — 6370000000 HC RX 637 (ALT 250 FOR IP): Performed by: PHYSICIAN ASSISTANT

## 2023-01-04 PROCEDURE — 83735 ASSAY OF MAGNESIUM: CPT

## 2023-01-04 PROCEDURE — 1200000000 HC SEMI PRIVATE

## 2023-01-04 PROCEDURE — 36415 COLL VENOUS BLD VENIPUNCTURE: CPT

## 2023-01-04 PROCEDURE — 97165 OT EVAL LOW COMPLEX 30 MIN: CPT

## 2023-01-04 PROCEDURE — 85025 COMPLETE CBC W/AUTO DIFF WBC: CPT

## 2023-01-04 RX ADMIN — PANTOPRAZOLE SODIUM 40 MG: 40 TABLET, DELAYED RELEASE ORAL at 06:41

## 2023-01-04 RX ADMIN — OXYCODONE 5 MG: 5 TABLET ORAL at 18:08

## 2023-01-04 RX ADMIN — MIDODRINE HYDROCHLORIDE 2.5 MG: 5 TABLET ORAL at 17:47

## 2023-01-04 RX ADMIN — AMIODARONE HYDROCHLORIDE 100 MG: 200 TABLET ORAL at 09:21

## 2023-01-04 RX ADMIN — CALCITRIOL 0.25 MCG: 0.25 CAPSULE ORAL at 09:21

## 2023-01-04 RX ADMIN — OXYCODONE 5 MG: 5 TABLET ORAL at 03:26

## 2023-01-04 RX ADMIN — LEVOTHYROXINE SODIUM 100 MCG: 0.1 TABLET ORAL at 06:41

## 2023-01-04 RX ADMIN — METOPROLOL SUCCINATE 50 MG: 50 TABLET, FILM COATED, EXTENDED RELEASE ORAL at 09:21

## 2023-01-04 RX ADMIN — MIDODRINE HYDROCHLORIDE 2.5 MG: 5 TABLET ORAL at 09:22

## 2023-01-04 RX ADMIN — OXYCODONE 5 MG: 5 TABLET ORAL at 09:22

## 2023-01-04 RX ADMIN — ASPIRIN 81 MG: 81 TABLET, CHEWABLE ORAL at 09:22

## 2023-01-04 RX ADMIN — MIDODRINE HYDROCHLORIDE 2.5 MG: 5 TABLET ORAL at 13:35

## 2023-01-04 RX ADMIN — SPIRONOLACTONE 25 MG: 25 TABLET ORAL at 09:22

## 2023-01-04 RX ADMIN — Medication 10 ML: at 13:34

## 2023-01-04 RX ADMIN — WARFARIN SODIUM 5 MG: 5 TABLET ORAL at 17:48

## 2023-01-04 RX ADMIN — RIFAXIMIN 550 MG: 550 TABLET ORAL at 16:21

## 2023-01-04 RX ADMIN — Medication 10 ML: at 21:06

## 2023-01-04 RX ADMIN — INSULIN GLARGINE 16 UNITS: 100 INJECTION, SOLUTION SUBCUTANEOUS at 09:26

## 2023-01-04 ASSESSMENT — ENCOUNTER SYMPTOMS
SHORTNESS OF BREATH: 0
WHEEZING: 0
DIARRHEA: 1
VOMITING: 1
COUGH: 0
PHOTOPHOBIA: 0
EYE REDNESS: 0
SORE THROAT: 0
BACK PAIN: 0
NAUSEA: 1
EYE PAIN: 0
CONSTIPATION: 1
RHINORRHEA: 0
SINUS PAIN: 0
ABDOMINAL PAIN: 0

## 2023-01-04 ASSESSMENT — PAIN SCALES - GENERAL
PAINLEVEL_OUTOF10: 8
PAINLEVEL_OUTOF10: 6
PAINLEVEL_OUTOF10: 8

## 2023-01-04 ASSESSMENT — PAIN DESCRIPTION - ORIENTATION
ORIENTATION: LOWER;UPPER
ORIENTATION: LOWER

## 2023-01-04 ASSESSMENT — PAIN DESCRIPTION - LOCATION
LOCATION: BACK

## 2023-01-04 ASSESSMENT — PAIN DESCRIPTION - DESCRIPTORS
DESCRIPTORS: ACHING

## 2023-01-04 ASSESSMENT — PAIN SCALES - WONG BAKER
WONGBAKER_NUMERICALRESPONSE: 8
WONGBAKER_NUMERICALRESPONSE: 6

## 2023-01-04 NOTE — PROGRESS NOTES
Physician Progress Note      Natalie Rich  CSN #:                  [id number]  :                       1945  ADMIT DATE:       1/3/2023 1:21 PM  [address] [place] DATE:  RESPONDING  PROVIDER #:        Long Aviles MD          QUERY TEXT:    Patient admitted with TORIBIO, noted to have paroxysmal atrial fibrillation and is   maintained on Coumadin. If possible, please document in progress notes and/or   discharge summary if you are evaluating and/or treating any of the following: The medical record reflects the following:  Risk Factors: Hx Afib on Coumadin at home  Clinical Indicators: PAF documented on H&P, INR was 2.20 on admit. Treatment: continued on Coumadin during stay  Options provided:  -- Secondary hypercoagulable state in a patient with atrial fibrillation  -- Other - I will add my own diagnosis  -- Disagree - Not applicable / Not valid  -- Disagree - Clinically unable to determine / Unknown  -- Refer to Clinical Documentation Reviewer    PROVIDER RESPONSE TEXT:    This patient has secondary hypercoagulable state in a patient with atrial   fibrillation.     Query created by: Tony Lewis on 2023 3:28 PM      Electronically signed by:  Long Aviles MD 2023 4:11 PM

## 2023-01-04 NOTE — CONSULTS
Nephrology Consult Note  [phone]   [facility]. [facility]           Reason for Consult : TORIBIO on CKD    HISTORY OF PRESENT ILLNESS:                The patient is a 69 y. o.female with significant past medical history of CKD, atrial fibrillation, CAD/CABG, mitral valve replacement bioprosthetic 2018, hyperlipidemia, DM 2 came in with complaints of generalized weakness and diarrhea for 2 to 3 weeks, also has nausea and vomiting occasionally. Has had poor p.o. intake in the past few days. Of note she was recently treated with antibiotics for cellulitis of her right lower extremity. No history of sick contacts.   Chest x-ray on admission with clear lungs  CT of the A/P without contrast on admission showed small bowel pneumatosis and air in the mesentery, no acute findings  CT head with no acute findings  We are consulted for TORIBIO on CKD  She follows with Dr. Mónica Russell for CKD  She was last seen on 12/7/2022  CKD is presumed to be secondary to diabetic nephropathy and hypertensive nephrosclerosis  Her baseline creatinine appears to be high 1s to low 2s  Her most recent creatinine prior to admission was 2 on 12/8/2022  Creatinine on admission was 2.4 and was 2.2 at the time of consult  Patient seen and examined   at the bedside  She has been on isotonic fluids  Has not had a bowel movement in the past 2 days    Past Medical History:        Diagnosis Date    Asthma     Atrial fibrillation (HCC)     Eosinophilia     Hemoptysis     HIGH CHOLESTEROL     Hx of blood clots     Hypertension     Irregular heart beat     Other specified gastritis without mention of hemorrhage     Palpitations     Skin cancer     basal and squamous    Type II or unspecified type diabetes mellitus without mention of complication, not stated as uncontrolled        Past Surgical History:        Procedure Laterality Date    BRONCHOSCOPY  07/18/2016    Dr. Irvin Nielson - brushings from [address] [place] [address]  08/16/2018     Bert    CARDIAC CATHETERIZATION   and 5/3 2021    Cardiac cath, cardioversion and rafat     SECTION      CHOLECYSTECTOMY      COLONOSCOPY  2017    Dr. Penny Ellison - sigmoid diverticulosis, polypectomies x3    COLONOSCOPY  2014    Dr. Penny Ellison - sigmoid diverticulosis, polypectomies x3, internal hemorrhoids    COLONOSCOPY  10/10/2018    w/biopsy performed by Little Maxwell MD at [place] [address] N/A 2020    COLONOSCOPY DIAGNOSTIC performed by Brain Klein MD at [address]  2018    Dr. Temitope Bradshaw - x3 (LIMA-LAD, L SV-D1-PLV) modified BL MAZE procedure w/obliteration of WAYNE using 45mm AtriClip    INSERTABLE CARDIAC MONITOR Left 2018    Dr. Raul Alvarez # [id number] Medtronic    IR KYPHOPLASTY THORACIC FIRST LEVEL  2022    IR KYPHOPLASTY THORACIC FIRST LEVEL 2022 [facility] SPECIAL PROCEDURES    MITRAL VALVE REPLACEMENT  2018    Dr. Temitope Bradshaw - 27mm Medtronic Cinch tissue valve    PAIN MANAGEMENT PROCEDURE N/A 2020    C6-C7 MIDLINE  EPIDURAL STEROID INJECTION WITH FLUOROSCOPY performed by Osmar Wright MD at [address] [place] [address] Bilateral 2021    BILATERAL T11 TRANSFORAMINAL EPIDURAL STEROID INJECTION WITH FLUOROSCOPY performed by Osmar Wright MD at [facility]      TRANSESOPHAGEAL ECHOCARDIOGRAM  2018    during CABG/MVR    TUNNELED VENOUS CATHETER PLACEMENT Left 2018    Dr. Brian Cali - IJ for HD---since removed    UPPER GASTROINTESTINAL ENDOSCOPY N/A 10/10/2018    w/biopsy performed by Little Maxwell MD at [address]         Current Medications:    No current facility-administered medications on file prior to encounter.      Current Outpatient Medications on File Prior to Encounter   Medication Sig Dispense Refill    phytonadione (VITAMIN K) 5 MG tablet Take 1 tablet by mouth once for 1 dose 1 tablet 0    nystatin (MYCOSTATIN) 957919 UNIT/ML suspension Take 5 mLs by mouth 4 times daily 100 mL 0    vitamin D (CHOLECALCIFEROL) 25 MCG (1000 UT) TABS tablet Take 1,000 Units by mouth Six times weekly      insulin glargine (LANTUS SOLOSTAR) 100 UNIT/ML injection pen Inject 16 Units into the skin every morning 90 mL 1    oxyCODONE (ROXICODONE) 5 MG immediate release tablet Take 1 tablet by mouth 3 times daily as needed for Pain for up to 30 days.  90 tablet 0    levothyroxine (SYNTHROID) 100 MCG tablet TAKE 1 TABLET DAILY 90 tablet 3    midodrine (PROAMATINE) 2.5 MG tablet Take 1 tablet by mouth 3 times daily 90 tablet 2    vitamin D (ERGOCALCIFEROL) 1.25 MG (70312 UT) CAPS capsule TAKE ONE CAPSULE BY MOUTH ONCE WEEKLY 12 capsule 1    calcitRIOL (ROCALTROL) 0.25 MCG capsule 1 tablet Monday, Wednesday and Friday 30 capsule 3    predniSONE (DELTASONE) 10 MG tablet TAKE 1 TABLET AS NEEDED (EOSINOPHILIC GASATRITIS) 584 tablet 1    spironolactone (ALDACTONE) 25 MG tablet TAKE 1 TABLET TWICE A  tablet 1    blood glucose test strips (FREESTYLE LITE) strip USE TO TEST BLOOD SUGAR FOUR TIMES A  strip 3    torsemide (DEMADEX) 20 MG tablet TAKE 2 TABLETS TWICE A  tablet 3    warfarin (COUMADIN) 5 MG tablet TAKE 1 TABLET DAILY 100 tablet 3    potassium chloride (KLOR-CON M) 10 MEQ extended release tablet TAKE 1 TABLET DAILY 90 tablet 1    metoprolol succinate (TOPROL XL) 50 MG extended release tablet TAKE 1 TABLET DAILY 90 tablet 1    montelukast (SINGULAIR) 10 MG tablet TAKE 1 TABLET NIGHTLY 90 tablet 1    insulin lispro, 1 Unit Dial, (HUMALOG KWIKPEN) 100 UNIT/ML SOPN USE SLIDING SCALE, 140-199, 2 UNITS, 200-249, 3 UNITS, 250-300, 4 UNITS, GREATER THAN 300, INJECT 5 UNITS 15 mL 2    metOLazone (ZAROXOLYN) 2.5 MG tablet TAKE 1 TABLET ON TUESDAY AND FRIDAY AND AS DIRECTED 30 tablet 1    amiodarone (CORDARONE) 200 MG tablet TAKE 1 TABLET DAILY (Patient taking differently: Take 100 mg by mouth daily) 60 tablet 5    albuterol sulfate  (90 Base) MCG/ACT inhaler USE 2 INHALATIONS EVERY 6 HOURS AS NEEDED FOR WHEEZING 25.5 g 2    ipratropium-albuterol (DUONEB) 0.5-2.5 (3) MG/3ML SOLN nebulizer solution Inhale 3 mLs into the lungs every 4 hours as needed for Shortness of Breath 120 each 0    FreeStyle Lancets MISC 1 each by Does not apply route 4 times daily 200 each 5    Blood Glucose Monitoring Suppl (FREESTYLE LITE) GAETANO 1 Device by Does not apply route 4 times daily      Insulin Pen Needle (BD PEN NEEDLE JANNET U/F) 32G X 4 MM MISC USE 1 PEN NEEDLE FOUR TIMES A  each 3    magnesium oxide (MAG-OX) 400 MG tablet Take 1 tablet by mouth daily 90 tablet 3    ACETAMINOPHEN PO Take 500 mg by mouth every 6 hours as needed       albuterol (PROVENTIL) (2.5 MG/3ML) 0.083% nebulizer solution Take 3 mLs by nebulization every 6 hours as needed for Wheezing 120 each 3    polyethylene glycol (GLYCOLAX) 17 GM/SCOOP powder Take 17 g by mouth daily  (Patient not taking: Reported on 1/3/2023)      omeprazole (PRILOSEC) 20 MG delayed release capsule Take 20 mg by mouth daily      ondansetron (ZOFRAN) 4 MG tablet Take 1 tablet by mouth 3 times daily as needed for Nausea or Vomiting 30 tablet 0    aspirin 81 MG chewable tablet Take 1 tablet by mouth daily 30 tablet 3    vitamin B-12 500 MCG tablet Take 1 tablet by mouth daily 30 tablet 3       Allergies:  Yitvoqgr-qugcvyi-poaauo [fluocinolone], Ciprofloxacin, Diovan [valsartan], Flagyl [metronidazole], Metformin and related [metformin and related], Benazepril, Morphine, Saxagliptin, and Levaquin [levofloxacin]    Social History:    Social History     Socioeconomic History    Marital status:      Spouse name: Not on file    Number of children: Not on file    Years of education: Not on file    Highest education level: Not on file   Occupational History    Not on file   Tobacco Use    Smoking status: Never    Smokeless tobacco: Never   Vaping Use    Vaping Use: Never used   Substance and Sexual Activity    Alcohol use:  No Drug use: No    Sexual activity: Not on file   Other Topics Concern    Not on file   Social History Narrative    Not on file     Social Determinants of Health     Financial Resource Strain: Not on file   Food Insecurity: Not on file   Transportation Needs: Not on file   Physical Activity: Not on file   Stress: Not on file   Social Connections: Not on file   Intimate Partner Violence: Not on file   Housing Stability: Not on file       Family History:       Problem Relation Age of Onset    Cancer Father     Asthma Mother     Hypertension Mother     Heart Disease Mother     High Blood Pressure Mother            Review of Systems   Constitutional:  Positive for activity change, appetite change and fatigue. Negative for chills and fever. HENT:  Negative for ear discharge, ear pain, rhinorrhea, sinus pain, sneezing and sore throat. Eyes:  Negative for photophobia, pain and redness. Respiratory:  Negative for cough, shortness of breath and wheezing. Cardiovascular:  Negative for chest pain, palpitations and leg swelling. Gastrointestinal:  Positive for constipation, diarrhea, nausea and vomiting. Negative for abdominal pain. Genitourinary:  Negative for difficulty urinating, dysuria, frequency and urgency. Musculoskeletal:  Positive for arthralgias. Negative for back pain and myalgias. Neurological:  Negative for dizziness, tremors, syncope and headaches. Psychiatric/Behavioral:  Negative for confusion and hallucinations. PHYSICAL EXAM:      Vitals:    /67   Pulse (!) 101   Temp 98.1 °F (36.7 °C) (Oral)   Resp 18   Ht 5' 6\" (1.676 m)   Wt 202 lb (91.6 kg)   SpO2 94%   BMI 32.60 kg/m²   No intake/output data recorded. I/O this shift:  In: 360 [P.O.:360]  Out: -     Physical Exam:  General : AAOx3, not in pain or respiratory distress, resting in bed  HEENT : normocephalic, atraumatic, mucosa moist, no palor or icterus  CVS: S1 S2 normal, regular rhythm, no murmurs or rubs.   Lungs: Clear, no wheezing or crackles. Abd: Soft, bowel sounds normal, non-tender. Ext: Trace LE edema bilateral+  Skin: Warm. No rashes appreciated. : bladder non-distended, no tenderness over the bladder  Neuro: Alert and oriented x 3, nonfocal.  Joints: No erythema noted over joints.     DATA:    CBC with Differential:    Lab Results   Component Value Date/Time    WBC 5.2 01/04/2023 06:00 AM    RBC 3.97 01/04/2023 06:00 AM    HGB 12.0 01/04/2023 06:00 AM    HCT 36.5 01/04/2023 06:00 AM     01/04/2023 06:00 AM    MCV 92.0 01/04/2023 06:00 AM    MCH 30.2 01/04/2023 06:00 AM    MCHC 32.8 01/04/2023 06:00 AM    RDW 15.7 01/04/2023 06:00 AM    SEGSPCT 64.1 09/02/2020 11:37 AM    BANDSPCT 1 01/14/2019 07:26 PM    LYMPHOPCT 17.8 01/04/2023 06:00 AM    MONOPCT 14.2 01/04/2023 06:00 AM    BASOPCT 0.6 01/04/2023 06:00 AM    MONOSABS 0.7 01/04/2023 06:00 AM    LYMPHSABS 0.9 01/04/2023 06:00 AM    EOSABS 0.2 01/04/2023 06:00 AM    BASOSABS 0.0 01/04/2023 06:00 AM     BMP:    Lab Results   Component Value Date/Time     01/04/2023 06:00 AM    K 3.5 01/04/2023 06:00 AM     01/04/2023 06:00 AM    CO2 24 01/04/2023 06:00 AM    BUN 55 01/04/2023 06:00 AM    LABALBU 3.1 01/03/2023 01:57 PM    CREATININE 2.2 01/04/2023 06:00 AM    CALCIUM 8.7 01/04/2023 06:00 AM    GFRAA 31 09/06/2022 08:49 AM    LABGLOM 23 01/04/2023 06:00 AM    GLUCOSE 75 01/04/2023 06:00 AM     Ionized Calcium:  No results found for: IONCA  Magnesium:    Lab Results   Component Value Date/Time    MG 2.20 01/04/2023 06:00 AM     Phosphorus:    Lab Results   Component Value Date/Time    PHOS 3.0 11/30/2022 07:34 AM     Last 3 Troponin:    Lab Results   Component Value Date/Time    TROPONINI 0.04 01/03/2023 01:57 PM    TROPONINI 0.04 05/17/2022 10:22 PM    TROPONINI 0.06 05/17/2022 06:56 PM     U/A:    Lab Results   Component Value Date/Time    NITRITE neg 10/17/2022 09:15 AM    COLORU Yellow 01/03/2023 10:00 PM    PROTEINU Negative 01/03/2023 10:00 PM    PHUR 5.5 01/03/2023 10:00 PM    WBCUA 4 01/03/2023 10:00 PM    RBCUA 0 01/03/2023 10:00 PM    YEAST neg 02/12/2021 09:45 AM    BACTERIA None Seen 01/03/2023 10:00 PM    CLARITYU CLOUDY 01/03/2023 10:00 PM    SPECGRAV 1.010 01/03/2023 10:00 PM    LEUKOCYTESUR SMALL 01/03/2023 10:00 PM    UROBILINOGEN 1.0 01/03/2023 10:00 PM    BILIRUBINUR Negative 01/03/2023 10:00 PM    BILIRUBINUR neg 10/17/2022 09:15 AM    BLOODU Negative 01/03/2023 10:00 PM    GLUCOSEU Negative 01/03/2023 10:00 PM       ASSESSMENT/PLAN:      TORIBIO on CKD  Prerenal secondary to volume depletion  UA on admission with no blood or protein, suggestive of prerenal state  Creatinine improved to baseline at this time with IV fluids  -Continue to hold diuretics, torsemide on hold, will hold spironolactone as well  -Daily BMP  -No need of IV fluids at this time  CHF  TTE in 5/22 with LVEF of 40%, bioprosthetic mitral valve  Hold diuretics for now and resume when appropriate  Chronic hypotension  Continue midodrine  Gastroenteritis  Work-up per primary  Atrial fibrillation rate controlled  On BB and warfarin  DM II  Hemoglobin A1c has been up to 8s in the past, lately better controlled        Thank you for allowing me to participate in the care of this patient. I will continue to follow along. Please call with questions.     Randy Mckenzie MD, MD.  Office Phone : [phone]  1/4/2023

## 2023-01-04 NOTE — PROGRESS NOTES
Hospitalist Progress Note      PCP: Amber Maya MD    Date of Admission: 1/3/2023    Chief Complaint: Generalized weakness    Hospital Course:  Keya Barrera is a 69 y.o. F with h/o CKD, atrial fibrillation, CAD/CABG, mitral valve replacement bioprosthetic 2018, hyperlipidemia, DM 2 who presented with intermittent diarrhea for the last 2 weeks; multiple episodes, loose foul-smelling stools associated with nausea , vomiting and poor appetite. Patient denied any history of C. difficile but recently completed a course of antibiotics for cellulitis. CT in the ER showed small bowel pneumatosis and venous air in the mesentery which is unchanged from prior CT 5/2022. Serum creatinine was also noted to be 2.4 on admission. Subjective: Patient seen and examined. No further episodes of diarrhea since admission. No nausea or vomiting. Chronic lower back pain, no LE weakness or sensory impairment.     Medications:  Reviewed    Infusion Medications    sodium chloride      dextrose       Scheduled Medications    rifAXIMin  550 mg Oral TID    amiodarone  100 mg Oral Daily    aspirin  81 mg Oral Daily    calcitRIOL  0.25 mcg Oral Daily    insulin glargine  16 Units SubCUTAneous QAM    insulin lispro  0-8 Units SubCUTAneous TID WC    insulin lispro  0-4 Units SubCUTAneous Nightly    levothyroxine  100 mcg Oral Daily    metoprolol succinate  50 mg Oral Daily    midodrine  2.5 mg Oral TID WC    pantoprazole  40 mg Oral QAM AC    sodium chloride flush  5-40 mL IntraVENous 2 times per day    [START ON 1/9/2023] warfarin  2.5 mg Oral Once per day on Mon    warfarin  5 mg Oral Once per day on Sun Tue Wed Thu Fri Sat     PRN Meds: perflutren lipid microspheres, albuterol, ipratropium-albuterol, sodium chloride flush, sodium chloride, ondansetron **OR** ondansetron, polyethylene glycol, acetaminophen **OR** acetaminophen, dextrose bolus **OR** dextrose bolus, glucagon (rDNA), dextrose, oxyCODONE      Intake/Output Summary (Last 24 hours) at 1/4/2023 1628  Last data filed at 1/4/2023 0913  Gross per 24 hour   Intake 360 ml   Output --   Net 360 ml       Physical Exam Performed:    /76   Pulse 89   Temp 98.1 °F (36.7 °C) (Oral)   Resp 18   Ht 5' 6\" (1.676 m)   Wt 202 lb (91.6 kg)   SpO2 96%   BMI 32.60 kg/m²     General appearance: No apparent distress, appears stated age and cooperative. HEENT: Pupils equal, round, and reactive to light. Conjunctivae/corneas clear. Neck: Supple, with full range of motion. No jugular venous distention. Trachea midline. Respiratory:  Normal respiratory effort. Clear to auscultation, bilaterally without Rales/Wheezes/Rhonchi. Cardiovascular: Regular rate and rhythm with normal S1/S2 without murmurs, rubs or gallops. Abdomen: Soft, non-tender, non-distended with normal bowel sounds. Musculoskeletal: No clubbing, cyanosis or edema bilaterally. Full range of motion without deformity. Skin: Skin color, texture, turgor normal.  No rashes or lesions. Neurologic:  Neurovascularly intact without any focal sensory/motor deficits.  Cranial nerves: II-XII intact, grossly non-focal.  Psychiatric: Alert and oriented, thought content appropriate, normal insight  Capillary Refill: Brisk, 3 seconds, normal   Peripheral Pulses: +2 palpable, equal bilaterally       Labs:   Recent Labs     01/03/23  1357 01/04/23  0600   WBC 6.3 5.2   HGB 13.2 12.0   HCT 40.9 36.5    162     Recent Labs     01/03/23  1357 01/04/23  0600   * 133*   K 4.6 3.5   CL 97* 100   CO2 23 24   BUN 60* 55*   CREATININE 2.4* 2.2*   CALCIUM 8.9 8.7     Recent Labs     01/03/23  1357   AST 27   ALT 18   BILITOT 0.9   ALKPHOS 119     Recent Labs     01/03/23  0000 01/03/23  1357 01/04/23  0600   INR 2.20 1.95* 2.34*     Recent Labs     01/03/23  1357   TROPONINI 0.04*       Urinalysis:      Lab Results   Component Value Date/Time    NITRU Negative 01/03/2023 10:00 PM    WBCUA 4 01/03/2023 10:00 PM    BACTERIA None Seen 01/03/2023 10:00 PM    RBCUA 0 01/03/2023 10:00 PM    BLOODU Negative 01/03/2023 10:00 PM    SPECGRAV 1.010 01/03/2023 10:00 PM    GLUCOSEU Negative 01/03/2023 10:00 PM       Radiology:  CT HEAD WO CONTRAST   Final Result   1. No acute intracranial findings. 2. Chronic microvascular ischemic changes and prior infarcts involving the   left frontal, right parietal and right occipital lobes which appears similar   to the prior study. 3. Mucosal thickening in the right maxillary sinus. 4. Stable 1.2 cm right frontal meningioma. CT ABDOMEN PELVIS WO CONTRAST Additional Contrast? None   Final Result   1. 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 2. No acute findings elsewhere in the abdomen or pelvis. 3. Decreased size of a left ovarian cyst now measuring up to 7.6 cm versus   8.5 cm previously. Pelvic ultrasound could be performed for further   evaluation. XR CHEST PORTABLE   Final Result   Clear lungs. IP CONSULT TO HOSPITALIST  IP CONSULT TO GI  IP CONSULT TO PHARMACY  IP CONSULT TO NEPHROLOGY    Assessment/Plan:    Active Hospital Problems    Diagnosis     TORIBIO (acute kidney injury) ([facility] [address] [N17.9]      Priority: Medium     TORIBIO on CKD 3:   Baseline Scr high 1s to low 2s. Scr on admission 2.4. Started on IV fluids. Now 2.2. Nephrology consulted: Appreciate input. Hold torsemide, Aldactone, discontinued IV fluids. Diarrhea with nausea and vomiting:  Currently resolved. GI consult appreciated: Follow-up stool work-up if diarrhea recurs. Stop mag ox. Follow-up TSH. Started on Xifaxan 550 mg 3 times daily for 14 days for possible SIBO. Chronic systolic CHF:  No acute exacerbation. Off IV fluids and diuretics. Bioprosthetic mitral valve replacement: Stable on echo 5/6/2022. Chronic A. fib: Rate controlled on metoprolol and amiodarone. INR therapeutic. Continue warfarin.     Chronic hypotension: Continue midodrine. T2DM on long-term insulin:  Follow-up A1c. Monitor BG on Lantus/SSI. Chronic back pain:  Status post recent Kyphosplasty. Right foot numbness and weakness with dorsiflexsion resolved. Outpatient follow-up. Pain control. Chronically elevated troponin:  At baseline; 0.04-0.06. Asymptomatic. EKG: A. fib with nonspecific ST-T changes. Echo 5/6/2022: LVEF 40%. 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 Well-seated bioprosthetic mitral valve. Hypothyroidism: Continue Synthroid. Follow-up TSH. DVT Prophylaxis: Coumadin  Diet: ADULT DIET; Regular; 5 carb choices (75 gm/meal);  Low Fat/Low Chol/High Fiber/2 gm Na  Code Status: Full Code  PT/OT Eval Status: Independent    Dispo -Home once medically stable      Gigi Hinds MD

## 2023-01-04 NOTE — PROGRESS NOTES
Pharmacy to Dose Warfarin    Pharmacy consulted to dose warfarin for afib. INR Goal: 2.0-3.0    INR today: 2.34    Assessment/Plan:  - INR therapeutic   - Continue home dosing regimen      Pharmacy will continue to follow. Yair Zamorano, PharmD.   PGY-1 Resident  [id number]  01/04/23 8:16 AM

## 2023-01-04 NOTE — PROGRESS NOTES
Jasmin Zendejas 1 Department   Phone: [phone]    Occupational Therapy    [x] Initial Evaluation            [] Daily Treatment Note         [x] Discharge Summary      Patient: Andres Mesa   : 1945   MRN: [id number]   Date of Service:  2023    Admitting Diagnosis:  TORIBIO (acute kidney injury) [facility])  Current Admission Summary: 69 y.o. female having on and off diarrhea for the last 2 weeks patient has days where she has multiple episodes loose foul-smelling diarrhea and this resolved associate with nausea and vomiting and poor appetite no chest pain short breath fevers or chills patient recently antibiotics for cellulitis per the patient couple weeks ago no history of C. difficile. No travel or sick contacts. For the last day and a half patient also noticed numbness in her right foot with loss of balance and difficulty with dorsiflexion of the right foot. No headache or vision changes does have a prior history of stroke  Past Medical History:  has a past medical history of Asthma, Atrial fibrillation ([address] Eosinophilia, Hemoptysis, HIGH CHOLESTEROL, Hx of blood clots, Hypertension, Irregular heart beat, Other specified gastritis without mention of hemorrhage, Palpitations, Skin cancer, and Type II or unspecified type diabetes mellitus without mention of complication, not stated as uncontrolled. Past Surgical History:  has a past surgical history that includes Cholecystectomy;  section; Colonoscopy (2017); skin biopsy; bronchoscopy (2016); Coronary artery bypass graft (2018); Mitral valve replacement (2018); transesophageal echocardiogram (2018); Tunneled venous catheter placement (Left, 2018); Cardiac catheterization (2018); Insertable Cardiac Monitor (Left, 2018); Colonoscopy (2014); Upper gastrointestinal endoscopy (N/A, 10/10/2018); Colonoscopy (10/10/2018); Colonoscopy (N/A, 2020);  Pain management procedure (N/A, 12/18/2020); Pain management procedure (Bilateral, 1/18/2021); Cardiac catheterization (5/2 and 5/3 2021); and IR KYPHOPLASTY THORACIC 1 VERTEBRAL BODY (12/8/2022). Discharge Recommendations: Matthew Andino scored a 22/24 on the AM-[facility] ADL Inpatient form. At this time, no further OT is recommended upon discharge due to pt is at baseline. Recommend patient returns to prior setting with prior services. DME Required For Discharge: patient has all required DME for discharge    Precautions/Restrictions: high fall risk  Weight Bearing Restrictions: no restrictions  [] Right Upper Extremity  [] Left Upper Extremity [] Right Lower Extremity  [] Left Lower Extremity     Required Braces/Orthotics: no braces required   [] Right  [] Left  Positional Restrictions:no positional restrictions    Pre-Admission Information   Lives With: spouse    Type of Home: house  Home Layout: one level, full flight down to finished basement  Home Access:  3 step to enter with handrail. Handrails are located on B side. Bathroom Layout:  walk in tub  [address] [facility]:  built in seat and grab bars  Toilet Height: elevated height  Home Equipment: rollator - 4 wheeled walker, transport chair  Transfer Assistance: Independent without use of device  Ambulation Assistance: amb without device- supervision from     ADL Assistance:  pt reports IND with ADLs,  assists with LB ADLs prn  IADL Assistance:  shares with   Active :        [x] Yes  [x] No  Current Employment: retired.   Occupation: teacher  Hobbies: traveling  Recent Falls: denies     Examination   Vision:   Vision Corrective Device: wears glasses for reading  Hearing:   WFL  Posture:    Rounded shoulders  Sensation:   denies numbness and tingling- reports R foot numbness has resolved since admission  Tone:   Normotonic    ROM:   (B) UE AROM WFL  Strength:   (B) UE strength grossly [facility]    Therapist Clinical Decision Making (Complexity): low complexity  Clinical Presentation: stable      Subjective  General: patient seated EOB on arrival, agreeable to OT/PT evaluation  Pain: 5/10. Location: back  Pain Interventions: pain medication in place prior to arrival        Activities of Daily Living  Basic Activities of Daily Living  General Comments: declines ADLs this session. Pt reports she has been using restroom IND; pt reports  assists with LB dressing/bathing prn at home  Instrumental Activities of Daily Living  No IADL completed on this date. Functional Mobility  Bed Mobility  Bed mobility not completed on this date. Comments:  Transfers  Sit to stand transfer:modified independent  Stand to sit transfer: modified independent  Comments: EOB to no device  Functional Mobility:  Standing Balance: modified independent. Functional Mobility: .  modified independent  Functional Mobility Activity: 60  Functional Mobility Device Use: no device    Other Therapeutic Interventions    Functional Outcomes  AM-PAC Inpatient Daily Activity Raw Score: 22    Cognition  WFL  Orientation:    alert and oriented x 4  Command Following:   [facility]     Education  Barriers To Learning: none  Patient Education: patient educated on OT role and benefits, plan of care, discharge recommendations  Learning Assessment:  patient verbalizes and demonstrates understanding    Assessment  Activity Tolerance: tolerates well, VSS  Impairments Requiring Therapeutic Intervention: none - eval with same day discharge  Prognosis: good  Clinical Assessment: patient presents at baseline, no further OT needs at this time  Safety Interventions: patient left in bed, nurse notified, and family/caregiver present    Plan  Frequency: Eval with same day discharge. No follow up required. Current Treatment Recommendations: not applicable, evaluation completed with same day discharge. Goals  Patient Goals:    Short Term Goals:  Time Frame:   Patient eval with same day discharge. No goals set as patient is at baseline status.     Therapy Session Time     Individual Group Co-treatment   Time In    5963   Time Out    1323   Minutes    24        Timed Code Treatment Minutes:   9  Total Treatment Minutes:  24       Electronically Signed By: KARLO Zarate/JC [id number]

## 2023-01-04 NOTE — PROGRESS NOTES
4 Eyes Skin Assessment     NAME:  Dylan Oliva  YOB: 1945  MEDICAL RECORD NUMBER:  [id number]    The patient is being assessed for  Admission    I agree that One RN have performed a thorough Head to Toe Skin Assessment on the patient. ALL assessment sites listed below have been assessed. Areas assessed by both nurses:    Head, Face, Ears, Shoulders, Back, Chest, Arms, Elbows, Hands, Sacrum. Buttock, Coccyx, Ischium, and Legs. Feet and Heels        Does the Patient have a Wound?  No noted wound(s)       Octavio Prevention initiated by RN: No   Wound Care Orders initiated by RN: No    Pressure Injury (Stage 3,4, Unstageable, DTI, NWPT, and Complex wounds) if present place referral order by RN under : NA    New and Established Ostomies, if present place, referral order under : NA      Nurse 1 eSignature: Electronically signed by Ivy Valencia RN on 1/3/23 at 8:26 PM EST    **SHARE this note so that the co-signing nurse is able to place an eSignature**    Nurse 2 eSignature: Electronically signed by Tamela Ozuna RN on 1/4/23 at 2:19 AM EST

## 2023-01-04 NOTE — CONSULTS
Gastroenterology Consult Note        Patient: Blanca August  : 1945  Acct#:      Date:  2023    Subjective:       History of Present Illness  Patient is a 77 y.o.  female admitted with Generalized abdominal pain [R10.84]  Elevated serum creatinine [R79.89]  Right leg weakness [R29.898]  TORIBIO (acute kidney injury) (HCC) [N17.9]  Nausea vomiting and diarrhea [R11.2, R19.7]  Anticoagulated on Coumadin [Z79.01] who is seen in consult for diarrhea.  H/o CMP, CHF, CAD, CABG, afib on coumadin, DM.   We have seen pt in the past for jejunal diverticulitis (), colitis and rectal bleeding felt to be hemorrhoidal ().  She had a CT in 2022 showing pneumoperitoneum with multiple loculations and small bowel pneumatosis.  She was managed conservatively by surgery team.  Per surgery notes, this has been noted on multiple CTs since then.    Patient now presents with 2-3 week history of diarrhea along with nausea and vomiting.  States that she may have diarrhea a couple times in a day.  When she has the diarrhea she also vomits.  Then may have 1 or 2 days without any diarrhea or bowel movements.  Not really having any abdominal pain.  There has been some soreness.  Diarrhea persisted so she came to the ER.  Has not had any bowel movements or diarrhea since admission.  No hematemesis, melena, hematochezia.  CT in the ER showed small bowel pneumatosis and venous air in the mesentery which is unchanged from prior CT 2022.    Denies any new meds prior to onset of diarrhea.    Past Medical History:   Diagnosis Date    Asthma     Atrial fibrillation (HCC)     Eosinophilia     Hemoptysis     HIGH CHOLESTEROL     Hx of blood clots     Hypertension     Irregular heart beat     Other specified gastritis without mention of hemorrhage     Palpitations     Skin cancer     basal and squamous    Type II or unspecified type diabetes mellitus without mention of complication, not stated as  uncontrolled       Past Surgical History:   Procedure Laterality Date    BRONCHOSCOPY  2016    Dr. Flash Chavez - brushings from [facility]  2018    Dr. Sujey Yadav and 5/3 2021    Cardiac cath, cardioversion and rafta     SECTION      CHOLECYSTECTOMY      COLONOSCOPY  2017    Dr. Sarah Fuller - sigmoid diverticulosis, polypectomies x3    COLONOSCOPY  2014    Dr. Sarah Fuller - sigmoid diverticulosis, polypectomies x3, internal hemorrhoids    COLONOSCOPY  10/10/2018    w/biopsy performed by Estefania Mckinley MD at [place] [address] N/A 2020    COLONOSCOPY DIAGNOSTIC performed by Flash Chavez MD at [facility]  2018    Dr. Irma Melissa - x3 (LIMA-LAD, L SV-D1-PLV) modified BL MAZE procedure w/obliteration of WAYNE using 45mm AtriClip    INSERTABLE CARDIAC MONITOR Left 2018    Dr. Vicki [facility] SN# [id number] Medtronic    IR KYPHOPLASTY THORACIC FIRST LEVEL  2022    IR KYPHOPLASTY THORACIC FIRST LEVEL 2022 [facility] SPECIAL PROCEDURES    MITRAL VALVE REPLACEMENT  2018    Dr. Irma Melissa - 27mm Medtronic Cinch tissue valve    PAIN MANAGEMENT PROCEDURE N/A 2020    C6-C7 MIDLINE  EPIDURAL STEROID INJECTION WITH FLUOROSCOPY performed by Gurinder Mike MD at [address] [place] [address] Bilateral 2021    BILATERAL T11 TRANSFORAMINAL EPIDURAL STEROID INJECTION WITH FLUOROSCOPY performed by uGrinder Mike MD at [facility]      TRANSESOPHAGEAL ECHOCARDIOGRAM  2018    during CABG/MVR    TUNNELED VENOUS CATHETER PLACEMENT Left 2018    Dr. Jayashree Guevara -  for HD---since removed    UPPER GASTROINTESTINAL ENDOSCOPY N/A 10/10/2018    w/biopsy performed by Estefania Mckinley MD at [address] [place] [address]      Past Endoscopic History  2020 with Dr Arnold Metcalf for h/o polyps  IMPRESSION : Diverticulitis of large intestine w/o perforation or abscess  w/o bleeding - K57.32  Hemorrhoids, other - K64.8     EGD and colonoscopy with Dr Sommer Edge 10/2018 for iron deficiency anemia and colitis on CT  Impression:    -Mild gastritis, biopsied  -Normal duodenum, biopsied      Impression:  Incomplete colonoscopy. We were only to examine up to the mid-transverse colon due to colon tortuosity and redundancy. Left sided diverticulosis. Medium sized internal and external hemorrhoids with redundant stacie-anal tissue (this is the likely cause of recent rectal bleeding). Admission Meds  No current facility-administered medications on file prior to encounter. Current Outpatient Medications on File Prior to Encounter   Medication Sig Dispense Refill    phytonadione (VITAMIN K) 5 MG tablet Take 1 tablet by mouth once for 1 dose 1 tablet 0    nystatin (MYCOSTATIN) 130666 UNIT/ML suspension Take 5 mLs by mouth 4 times daily 100 mL 0    vitamin D (CHOLECALCIFEROL) 25 MCG (1000 UT) TABS tablet Take 1,000 Units by mouth Six times weekly      insulin glargine (LANTUS SOLOSTAR) 100 UNIT/ML injection pen Inject 16 Units into the skin every morning 90 mL 1    oxyCODONE (ROXICODONE) 5 MG immediate release tablet Take 1 tablet by mouth 3 times daily as needed for Pain for up to 30 days.  90 tablet 0    levothyroxine (SYNTHROID) 100 MCG tablet TAKE 1 TABLET DAILY 90 tablet 3    midodrine (PROAMATINE) 2.5 MG tablet Take 1 tablet by mouth 3 times daily 90 tablet 2    vitamin D (ERGOCALCIFEROL) 1.25 MG (71871 UT) CAPS capsule TAKE ONE CAPSULE BY MOUTH ONCE WEEKLY 12 capsule 1    calcitRIOL (ROCALTROL) 0.25 MCG capsule 1 tablet Monday, Wednesday and Friday 30 capsule 3    predniSONE (DELTASONE) 10 MG tablet TAKE 1 TABLET AS NEEDED (EOSINOPHILIC GASATRITIS) 010 tablet 1    spironolactone (ALDACTONE) 25 MG tablet TAKE 1 TABLET TWICE A  tablet 1    blood glucose test strips (FREESTYLE LITE) strip USE TO TEST BLOOD SUGAR FOUR TIMES A  strip 3    torsemide (DEMADEX) 20 MG tablet TAKE 2 TABLETS TWICE A  tablet 3    warfarin (COUMADIN) 5 MG tablet TAKE 1 TABLET DAILY 100 tablet 3    potassium chloride (KLOR-CON M) 10 MEQ extended release tablet TAKE 1 TABLET DAILY 90 tablet 1    metoprolol succinate (TOPROL XL) 50 MG extended release tablet TAKE 1 TABLET DAILY 90 tablet 1    montelukast (SINGULAIR) 10 MG tablet TAKE 1 TABLET NIGHTLY 90 tablet 1    insulin lispro, 1 Unit Dial, (HUMALOG KWIKPEN) 100 UNIT/ML SOPN USE SLIDING SCALE, 140-199, 2 UNITS, 200-249, 3 UNITS, 250-300, 4 UNITS, GREATER THAN 300, INJECT 5 UNITS 15 mL 2    metOLazone (ZAROXOLYN) 2.5 MG tablet TAKE 1 TABLET ON TUESDAY AND FRIDAY AND AS DIRECTED 30 tablet 1    amiodarone (CORDARONE) 200 MG tablet TAKE 1 TABLET DAILY (Patient taking differently: Take 100 mg by mouth daily) 60 tablet 5    albuterol sulfate  (90 Base) MCG/ACT inhaler USE 2 INHALATIONS EVERY 6 HOURS AS NEEDED FOR WHEEZING 25.5 g 2    ipratropium-albuterol (DUONEB) 0.5-2.5 (3) MG/3ML SOLN nebulizer solution Inhale 3 mLs into the lungs every 4 hours as needed for Shortness of Breath 120 each 0    FreeStyle Lancets MISC 1 each by Does not apply route 4 times daily 200 each 5    Blood Glucose Monitoring Suppl (FREESTYLE LITE) GAETANO 1 Device by Does not apply route 4 times daily      Insulin Pen Needle (BD PEN NEEDLE JANNET U/F) 32G X 4 MM MISC USE 1 PEN NEEDLE FOUR TIMES A  each 3    magnesium oxide (MAG-OX) 400 MG tablet Take 1 tablet by mouth daily 90 tablet 3    ACETAMINOPHEN PO Take 500 mg by mouth every 6 hours as needed       albuterol (PROVENTIL) (2.5 MG/3ML) 0.083% nebulizer solution Take 3 mLs by nebulization every 6 hours as needed for Wheezing 120 each 3    polyethylene glycol (GLYCOLAX) 17 GM/SCOOP powder Take 17 g by mouth daily  (Patient not taking: Reported on 1/3/2023)      omeprazole (PRILOSEC) 20 MG delayed release capsule Take 20 mg by mouth daily      ondansetron (ZOFRAN) 4 MG tablet Take 1 tablet by mouth 3 times daily as needed for Nausea or Vomiting 30 tablet 0    aspirin 81 MG chewable tablet Take 1 tablet by mouth daily 30 tablet 3    vitamin B-12 500 MCG tablet Take 1 tablet by mouth daily 30 tablet 3           Allergies  Allergies   Allergen Reactions    Mbjyqtuu-Qhdkwsu-Dppuua [Fluocinolone] Shortness Of Breath    Ciprofloxacin Shortness Of Breath    Diovan [Valsartan] Shortness Of Breath    Flagyl [Metronidazole] Shortness Of Breath     Has taken diflucan at home 12/7/15    Metformin And Related [Metformin And Related] Shortness Of Breath    Benazepril      Other reaction(s): Not Recorded    Morphine      Bad reaction. \"makes her feel horrible\". Saxagliptin      Other reaction(s): Not Recorded    Levaquin [Levofloxacin] Rash      Social   Social History     Tobacco Use    Smoking status: Never    Smokeless tobacco: Never   Substance Use Topics    Alcohol use: No        Family History   Problem Relation Age of Onset    Cancer Father     Asthma Mother     Hypertension Mother     Heart Disease Mother     High Blood Pressure Mother        Review of Systems  Pertinent items are noted in HPI. Physical Exam  Blood pressure 102/67, pulse (!) 101, temperature 98.1 °F (36.7 °C), temperature source Oral, resp. rate 18, height 5' 6\" (1.676 m), weight 202 lb (91.6 kg), SpO2 94 %, not currently breastfeeding. General appearance: alert, cooperative, no distress, appears stated age  Eyes: Anicteric  Head: Normocephalic, without obvious abnormality  Lungs: clear to auscultation bilaterally, Normal Effort  Heart: regular rate and rhythm, normal S1 and S2, no murmurs or rubs  Abdomen: protuberant, soft, non-tender. Bowel sounds normal. No masses,  no organomegaly.    Extremities: atraumatic, no cyanosis or edema  Skin: warm and dry, no jaundice  Neuro: Grossly intact, A&OX3  Musculoskeletal: 5/5  strength BUE      Data Review:    Recent Labs     01/03/23  1357 01/04/23  0600   WBC 6.3 5.2   HGB 13.2 12.0   HCT 40.9 36.5   MCV 92.0 92.0    162 Recent Labs     01/03/23  1357 01/04/23  0600   * 133*   K 4.6 3.5   CL 97* 100   CO2 23 24   BUN 60* 55*   CREATININE 2.4* 2.2*     Recent Labs     01/03/23  1357   AST 27   ALT 18   BILITOT 0.9   ALKPHOS 119     Recent Labs     01/03/23  1357   LIPASE 29.0     Recent Labs     01/03/23  0000 01/03/23  1357 01/04/23  0600   PROTIME  --  22.3* 25.7*   INR 2.20 1.95* 2.34*     No results for input(s): PTT in the last 72 hours. No results for input(s): OCCULTBLD in the last 72 hours. Imaging Studies:               CT-scan of abdomen and pelvis wo contrast 1/3/23:  Impression   1. 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 2. No acute findings elsewhere in the abdomen or pelvis. 3. Decreased size of a left ovarian cyst now measuring up to 7.6 cm versus   8.5 cm previously. Pelvic ultrasound could be performed for further   evaluation. Assessment:     Diarrhea, nausea, vomiting -intermittent x2 to 3 weeks. None here. We will double check with patient to see if she is on mag oxide which is listed as a home med and can cause diarrhea. She is on synthroid. Will check a TSH. 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 Has been evaluated by surgery in the past for CT findings. Will check a lactic acid. Recommendations:   -Check stool for C.diff, GI bacterial pathogens PCR, and EIA for parasites   -start xifaxan 550 mg TID x14 for possible SIBO  - check TSH    Discussed with Dr. Hai Vick PAAlmaC  [facility]    I have personally performed a face to face diagnostic evaluation on this patient.  I have spent more than 50% of the total clinical encounter in interviewing/examining the patient, reviewing patient chart (including but not limited to notes, labs, imaging and other testing), documentation of findings and subsequent follow up of ordered medication and testing, placing referrals and communication with patient care providers, coordinating future care, as well as documentation in the EHR. I agree with the findings and recommended plan of care, as documented by the physician assistant/nurse practitioner. In summary, my findings and plan are the followin-year-old  female with intermittent nausea vomiting and diarrhea for the past several weeks. 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 The bowel pneumatosis was first noted in May 2021 and has been managed conservatively. She has extensive GI history and multiple endoscopies,as documented above. Abdomen is soft nontender no rebound or guarding. Normal lactic acid. Ddx Infectious diarrhea, IBS, SIBO (due to jejunal diverticulosis)  Plan: Check C. difficile, GI bacterial pathogens PCR, O&P.   Trial of Xifaxan 550 mg TID for 14 days for suspected SIBO    Meg Huber MD, MSc  [facility]  23

## 2023-01-04 NOTE — ED NOTES
Report given to 5T, this nurse to take pt up via wheelchair on [address] [facility], RN  01/03/23 7914

## 2023-01-04 NOTE — PROGRESS NOTES
Clinical Pharmacy Note: Pharmacy to Dose Warfarin    Pharmacy consulted by Dr. Ascencion Donis to dose warfarin. Channing Camacho is a 69 y.o. female  is receiving warfarin for indication: a fib, blood clots. INR Goal Range: 2.0-3.0  Prior to admission warfarin dosing regimen: 2.5 mg Mondays, 5 mg all other days    INR today:   Lab Results   Component Value Date/Time    INR 1.95 01/03/2023 01:57 PM       Assessment/Plan:  INR is slightly subtherapeutic on prior to admission dosing regimen. However, she has not yet had dose today. Based on today's assessment, dose warfarin 5 mg now, then continue outpatient regimen. Daily INR is ordered. Pharmacy will continue to monitor and make adjustments to regimen as necessary.      Thank you for the consult,     Balwinder Templeton, PharmD, [address] [place] [address]

## 2023-01-04 NOTE — PROGRESS NOTES
[facility] [address]   Phone: [phone]    Physical Therapy    [x] Initial Evaluation            [] Daily Treatment Note         [x] Discharge Summary      Patient: Ana Jalloh   : 1945   MRN: [id number]   Date of Service:  2023  Admitting Diagnosis: TORIBIO (acute kidney injury) [facility])    Current Admission Summary: Patient reports that she has had vomiting diarrhea off and on for the last few weeks. Is neither black nor bloody. She denies any fevers. No chest pain. On exam she does have some mild abdominal tenderness but no rebound, rigidity or guarding. Heart tachycardic and irregularly irregular. Patient has some very slight dorsiflexion weakness of the right lower extremity but no other weakness in the right lower extremity. No other abnormal neurologic findings on exam.Awake, alert and oriented to person, place and time. Sensation is intact to light touch in the upper and lower extremities. Cranial Nerves 2-12 are intact. Patellar DTRs intact. Finger-to-nose intact. Heel-to-shin intact. Normal Romberg's test.    Past Medical History:  has a past medical history of Asthma, Atrial fibrillation ([address] Eosinophilia, Hemoptysis, HIGH CHOLESTEROL, Hx of blood clots, Hypertension, Irregular heart beat, Other specified gastritis without mention of hemorrhage, Palpitations, Skin cancer, and Type II or unspecified type diabetes mellitus without mention of complication, not stated as uncontrolled. Past Surgical History:  has a past surgical history that includes Cholecystectomy;  section; Colonoscopy (2017); skin biopsy; bronchoscopy (2016); Coronary artery bypass graft (2018); Mitral valve replacement (2018); transesophageal echocardiogram (2018); Tunneled venous catheter placement (Left, 2018); Cardiac catheterization (2018); Insertable Cardiac Monitor (Left, 2018); Colonoscopy (2014);  Upper gastrointestinal endoscopy (N/A, 10/10/2018); Colonoscopy (10/10/2018); Colonoscopy (N/A, 8/7/2020); Pain management procedure (N/A, 12/18/2020); Pain management procedure (Bilateral, 1/18/2021); Cardiac catheterization (5/2 and 5/3 2021); and IR KYPHOPLASTY THORACIC 1 VERTEBRAL BODY (12/8/2022). Discharge Recommendations: Jah Denis scored a 23/24 on the AM-PAC short mobility form. At this time, no further PT is recommended upon discharge due to patient at independent level. Recommend patient returns to prior setting with prior services. DME Required For Discharge: No new DME required  Precautions/Restrictions: high fall risk, contact precautions, Isolation precautions, CDIFF r/o  Positional Restrictions:no positional restrictions    Pre-Admission Information   Lives With: spouse                  Type of Home: house  Home Layout: one level, full flight down to finished basement  Home Access:  3 step to enter with handrail. Handrails are located on B side. Bathroom Layout:  walk in tub  [employer]:  built in seat and grab bars  Toilet Height: elevated height  Home Equipment: rollator - 4 wheeled walker, transport chair  Transfer Assistance: Independent without use of device  Ambulation Assistance: amb without device- supervision from     ADL Assistance:  pt reports IND with ADLs,  assists with LB ADLs prn  IADL Assistance:  shares with   Active :        [x] Yes                 [x] No  Current Employment: retired. Occupation: teacher  Hobbies: traveling  Recent Falls: denies     Examination   Vision:   Vision Gross Assessment: Impaired and Vision Corrective Device: wears glasses for reading  Hearing:   [facility]  Observation:   General Observation:  Patient sitting EOB, eating. Spouse present.   Posture:   WFL  Sensation:   WFL  ROM:   (B) LE ROM WFL  Strength:   (B) LE gross strength WFL  Therapist Clinical Decision Making (Complexity): low complexity  Clinical Presentation: stable      Subjective  General: Patient reports she is feeling better. The numbness she had in her right foot has subsided. Pain: 0/10  Pain Interventions: repositioned        Functional Mobility  Bed Mobility  Bed mobility not completed on this date. Comments:  Patient sitting EOB upon entry. Transfers  Sit to stand transfer: modified independent  Stand to sit transfer: modified independent  Bed to chair transfer: modified independent  Comments:  Ambulation  Surface:level surface  Assistive Device: no device  Assistance: modified independent  Distance: 60'  Gait Mechanics: dec'd obie, dec'd step length, steady gait, reciprocal, no LOB  Comments:    Stair Mobility  Stair mobility not completed on this date. Comments:  Balance  Static Sitting Balance: good: independent with functional balance in unsupported position  Static Standing Balance: good: independent with functional balance in unsupported position  Dynamic Standing Balance: good: independent with functional balance in unsupported position  Comments:    Other Therapeutic Interventions    Functional Outcomes  AM-PAC Inpatient Mobility Raw Score : 23              Cognition  WFL  Orientation:    A&O x 4    Education  Barriers To Learning: none  Patient Education: patient educated on goals, PT role and benefits, plan of care, general safety, discharge recommendations  Learning Assessment:  Pt verbalized and demonstrates understanding    Assessment  Impairments Requiring Therapeutic Intervention: none - eval with same day discharge  Prognosis: good without need for therapy intervention  Clinical Assessment: Patient presenting at independent level for completion of required mobility tasks for return to home. Eval with d/c at this time. No therapy services indicated. Safety Interventions: patient left in bed, call light within reach, nurse notified, and family/caregiver present    Plan  Frequency: Eval with same day discharge.   No follow up required. Current Treatment Recommendations: Not applicable, evaluation completed with same day discharge. Goals  Patient eval with same day discharge. No goals set as patient is at baseline mobility status.       Therapy Session Time      Individual Group Co-treatment   Time In     1300   Time Out     1323   Minutes     23     Timed Code Treatment Minutes:  8 Minutes  Total Treatment Minutes:  23       Electronically Signed By: Bright Manriquez PT, DPT, ATC-R [id number]

## 2023-01-05 ENCOUNTER — APPOINTMENT (OUTPATIENT)
Dept: GENERAL RADIOLOGY | Age: 78
DRG: 683 | End: 2023-01-05
Payer: MEDICARE

## 2023-01-05 ENCOUNTER — APPOINTMENT (OUTPATIENT)
Dept: CT IMAGING | Age: 78
DRG: 683 | End: 2023-01-05
Payer: MEDICARE

## 2023-01-05 LAB
A/G RATIO: 0.8 (ref 1.1–2.2)
ALBUMIN SERPL-MCNC: 2.6 G/DL (ref 3.4–5)
ALP BLD-CCNC: 114 U/L (ref 40–129)
ALT SERPL-CCNC: 16 U/L (ref 10–40)
ANION GAP SERPL CALCULATED.3IONS-SCNC: 9 MMOL/L (ref 3–16)
AST SERPL-CCNC: 21 U/L (ref 15–37)
BASOPHILS ABSOLUTE: 0 K/UL (ref 0–0.2)
BASOPHILS RELATIVE PERCENT: 0.5 %
BILIRUB SERPL-MCNC: 0.6 MG/DL (ref 0–1)
BUN BLDV-MCNC: 55 MG/DL (ref 7–20)
CALCIUM SERPL-MCNC: 8.7 MG/DL (ref 8.3–10.6)
CHLORIDE BLD-SCNC: 100 MMOL/L (ref 99–110)
CO2: 28 MMOL/L (ref 21–32)
CREAT SERPL-MCNC: 2 MG/DL (ref 0.6–1.2)
EKG ATRIAL RATE: 104 BPM
EKG DIAGNOSIS: NORMAL
EKG Q-T INTERVAL: 368 MS
EKG QRS DURATION: 92 MS
EKG QTC CALCULATION (BAZETT): 479 MS
EKG R AXIS: 5 DEGREES
EKG T AXIS: 119 DEGREES
EKG VENTRICULAR RATE: 102 BPM
EOSINOPHILS ABSOLUTE: 0.2 K/UL (ref 0–0.6)
EOSINOPHILS RELATIVE PERCENT: 2.7 %
GFR SERPL CREATININE-BSD FRML MDRD: 25 ML/MIN/{1.73_M2}
GLUCOSE BLD-MCNC: 100 MG/DL (ref 70–99)
GLUCOSE BLD-MCNC: 121 MG/DL (ref 70–99)
GLUCOSE BLD-MCNC: 130 MG/DL (ref 70–99)
GLUCOSE BLD-MCNC: 155 MG/DL (ref 70–99)
GLUCOSE BLD-MCNC: 94 MG/DL (ref 70–99)
GLUCOSE BLD-MCNC: 97 MG/DL (ref 70–99)
HCT VFR BLD CALC: 36.4 % (ref 36–48)
HEMOGLOBIN: 11.8 G/DL (ref 12–16)
INR BLD: 3.23 (ref 0.87–1.14)
LYMPHOCYTES ABSOLUTE: 0.9 K/UL (ref 1–5.1)
LYMPHOCYTES RELATIVE PERCENT: 13.5 %
MAGNESIUM: 2.3 MG/DL (ref 1.8–2.4)
MCH RBC QN AUTO: 29.6 PG (ref 26–34)
MCHC RBC AUTO-ENTMCNC: 32.5 G/DL (ref 31–36)
MCV RBC AUTO: 91.1 FL (ref 80–100)
MONOCYTES ABSOLUTE: 0.8 K/UL (ref 0–1.3)
MONOCYTES RELATIVE PERCENT: 12.9 %
NEUTROPHILS ABSOLUTE: 4.5 K/UL (ref 1.7–7.7)
NEUTROPHILS RELATIVE PERCENT: 70.4 %
PDW BLD-RTO: 16.1 % (ref 12.4–15.4)
PERFORMED ON: ABNORMAL
PERFORMED ON: NORMAL
PHOSPHORUS: 3.1 MG/DL (ref 2.5–4.9)
PLATELET # BLD: 156 K/UL (ref 135–450)
PMV BLD AUTO: 8.8 FL (ref 5–10.5)
POTASSIUM SERPL-SCNC: 4 MMOL/L (ref 3.5–5.1)
PROTHROMBIN TIME: 33.2 SEC (ref 11.7–14.5)
RBC # BLD: 4 M/UL (ref 4–5.2)
SODIUM BLD-SCNC: 137 MMOL/L (ref 136–145)
T4 FREE: 2.9 NG/DL (ref 0.9–1.8)
TOTAL PROTEIN: 5.8 G/DL (ref 6.4–8.2)
TSH REFLEX FT4: 0.1 UIU/ML (ref 0.27–4.2)
WBC # BLD: 6.3 K/UL (ref 4–11)

## 2023-01-05 PROCEDURE — 36415 COLL VENOUS BLD VENIPUNCTURE: CPT

## 2023-01-05 PROCEDURE — 83735 ASSAY OF MAGNESIUM: CPT

## 2023-01-05 PROCEDURE — 6370000000 HC RX 637 (ALT 250 FOR IP): Performed by: INTERNAL MEDICINE

## 2023-01-05 PROCEDURE — 6370000000 HC RX 637 (ALT 250 FOR IP): Performed by: PHYSICIAN ASSISTANT

## 2023-01-05 PROCEDURE — 72128 CT CHEST SPINE W/O DYE: CPT

## 2023-01-05 PROCEDURE — 1200000000 HC SEMI PRIVATE

## 2023-01-05 PROCEDURE — 72131 CT LUMBAR SPINE W/O DYE: CPT

## 2023-01-05 PROCEDURE — 84443 ASSAY THYROID STIM HORMONE: CPT

## 2023-01-05 PROCEDURE — 93010 ELECTROCARDIOGRAM REPORT: CPT | Performed by: INTERNAL MEDICINE

## 2023-01-05 PROCEDURE — 80053 COMPREHEN METABOLIC PANEL: CPT

## 2023-01-05 PROCEDURE — 84439 ASSAY OF FREE THYROXINE: CPT

## 2023-01-05 PROCEDURE — 74018 RADEX ABDOMEN 1 VIEW: CPT

## 2023-01-05 PROCEDURE — 85025 COMPLETE CBC W/AUTO DIFF WBC: CPT

## 2023-01-05 PROCEDURE — 6370000000 HC RX 637 (ALT 250 FOR IP): Performed by: NURSE PRACTITIONER

## 2023-01-05 PROCEDURE — 85610 PROTHROMBIN TIME: CPT

## 2023-01-05 PROCEDURE — 84100 ASSAY OF PHOSPHORUS: CPT

## 2023-01-05 PROCEDURE — 2580000003 HC RX 258: Performed by: INTERNAL MEDICINE

## 2023-01-05 RX ORDER — ACETAMINOPHEN 500 MG
1000 TABLET ORAL EVERY 8 HOURS SCHEDULED
Status: DISCONTINUED | OUTPATIENT
Start: 2023-01-05 | End: 2023-01-10 | Stop reason: HOSPADM

## 2023-01-05 RX ORDER — GABAPENTIN 100 MG/1
100 CAPSULE ORAL 3 TIMES DAILY
Status: DISCONTINUED | OUTPATIENT
Start: 2023-01-05 | End: 2023-01-05

## 2023-01-05 RX ORDER — LIDOCAINE 4 G/G
2 PATCH TOPICAL DAILY
Status: DISCONTINUED | OUTPATIENT
Start: 2023-01-05 | End: 2023-01-10 | Stop reason: HOSPADM

## 2023-01-05 RX ORDER — METHOCARBAMOL 750 MG/1
750 TABLET, FILM COATED ORAL 4 TIMES DAILY PRN
Status: DISCONTINUED | OUTPATIENT
Start: 2023-01-05 | End: 2023-01-10 | Stop reason: HOSPADM

## 2023-01-05 RX ORDER — OXYCODONE HYDROCHLORIDE 5 MG/1
10 TABLET ORAL ONCE
Status: COMPLETED | OUTPATIENT
Start: 2023-01-05 | End: 2023-01-05

## 2023-01-05 RX ADMIN — PANTOPRAZOLE SODIUM 40 MG: 40 TABLET, DELAYED RELEASE ORAL at 05:27

## 2023-01-05 RX ADMIN — OXYCODONE 10 MG: 5 TABLET ORAL at 10:11

## 2023-01-05 RX ADMIN — OXYCODONE 5 MG: 5 TABLET ORAL at 05:47

## 2023-01-05 RX ADMIN — RIFAXIMIN 550 MG: 550 TABLET ORAL at 08:25

## 2023-01-05 RX ADMIN — Medication 10 ML: at 22:17

## 2023-01-05 RX ADMIN — Medication 10 ML: at 08:31

## 2023-01-05 RX ADMIN — ACETAMINOPHEN 1000 MG: 500 TABLET ORAL at 14:36

## 2023-01-05 RX ADMIN — GABAPENTIN 100 MG: 100 CAPSULE ORAL at 10:11

## 2023-01-05 RX ADMIN — ACETAMINOPHEN 1000 MG: 500 TABLET ORAL at 22:02

## 2023-01-05 RX ADMIN — OXYCODONE 5 MG: 5 TABLET ORAL at 19:35

## 2023-01-05 RX ADMIN — AMIODARONE HYDROCHLORIDE 100 MG: 200 TABLET ORAL at 08:25

## 2023-01-05 RX ADMIN — LEVOTHYROXINE SODIUM 100 MCG: 0.1 TABLET ORAL at 05:27

## 2023-01-05 RX ADMIN — CALCITRIOL 0.25 MCG: 0.25 CAPSULE ORAL at 08:25

## 2023-01-05 RX ADMIN — MIDODRINE HYDROCHLORIDE 2.5 MG: 5 TABLET ORAL at 17:05

## 2023-01-05 RX ADMIN — RIFAXIMIN 550 MG: 550 TABLET ORAL at 14:37

## 2023-01-05 RX ADMIN — ASPIRIN 81 MG: 81 TABLET, CHEWABLE ORAL at 08:25

## 2023-01-05 RX ADMIN — INSULIN GLARGINE 16 UNITS: 100 INJECTION, SOLUTION SUBCUTANEOUS at 08:31

## 2023-01-05 RX ADMIN — METOPROLOL SUCCINATE 50 MG: 50 TABLET, FILM COATED, EXTENDED RELEASE ORAL at 08:30

## 2023-01-05 RX ADMIN — MIDODRINE HYDROCHLORIDE 2.5 MG: 5 TABLET ORAL at 14:38

## 2023-01-05 ASSESSMENT — PAIN SCALES - GENERAL
PAINLEVEL_OUTOF10: 6
PAINLEVEL_OUTOF10: 8
PAINLEVEL_OUTOF10: 10
PAINLEVEL_OUTOF10: 6
PAINLEVEL_OUTOF10: 0

## 2023-01-05 ASSESSMENT — PAIN DESCRIPTION - LOCATION
LOCATION: BACK
LOCATION: BACK

## 2023-01-05 ASSESSMENT — PAIN DESCRIPTION - ORIENTATION: ORIENTATION: LOWER

## 2023-01-05 ASSESSMENT — PAIN DESCRIPTION - DESCRIPTORS
DESCRIPTORS: ACHING

## 2023-01-05 NOTE — PROGRESS NOTES
Pharmacy to Dose Warfarin    Pharmacy consulted to dose warfarin for AFIB. INR Goal: 2.0-3.0    INR today: 3.23    Assessment/Plan:  - INR supratherapeutic  - Hold today's dose   - Possible concomitant drug-drug interactions include: amiodarone     Pharmacy will continue to follow. Archie Crawford, PharmD.   PGY-1 Resident  [id number]  01/05/23 8:54 AM

## 2023-01-05 NOTE — PROGRESS NOTES
Hospitalist Progress Note      PCP: Kaushal Holguin MD    Date of Admission: 1/3/2023    Chief Complaint: Generalized weakness    Hospital Course:  Gio Morales is a 69 y.o. F with h/o CKD, atrial fibrillation, CAD/CABG, mitral valve replacement bioprosthetic 2018, hyperlipidemia, DM 2 who presented with intermittent diarrhea for the last 2 weeks; multiple episodes, loose foul-smelling stools associated with nausea , vomiting and poor appetite. Patient denied any history of C. difficile but recently completed a course of antibiotics for cellulitis. CT in the ER showed small bowel pneumatosis and venous air in the mesentery which is unchanged from prior CT 5/2022. Serum creatinine was also noted to be 2.4 on admission. Subjective: Patient seen and examined. No further episodes of diarrhea since admission. No nausea or vomiting. Severe lower back pain, no LE weakness or sensory impairment.     Medications:  Reviewed    Infusion Medications    sodium chloride      dextrose       Scheduled Medications    warfarin placeholder: dosing by pharmacy   Other RX Placeholder    acetaminophen  1,000 mg Oral 3 times per day    lidocaine  2 patch TransDERmal Daily    gabapentin  100 mg Oral TID    rifAXIMin  550 mg Oral TID    amiodarone  100 mg Oral Daily    aspirin  81 mg Oral Daily    calcitRIOL  0.25 mcg Oral Daily    insulin glargine  16 Units SubCUTAneous QAM    insulin lispro  0-8 Units SubCUTAneous TID WC    insulin lispro  0-4 Units SubCUTAneous Nightly    levothyroxine  100 mcg Oral Daily    metoprolol succinate  50 mg Oral Daily    midodrine  2.5 mg Oral TID WC    pantoprazole  40 mg Oral QAM AC    sodium chloride flush  5-40 mL IntraVENous 2 times per day     PRN Meds: methocarbamol, perflutren lipid microspheres, albuterol, ipratropium-albuterol, sodium chloride flush, sodium chloride, ondansetron **OR** ondansetron, polyethylene glycol, dextrose bolus **OR** dextrose bolus, glucagon (rDNA), dextrose, oxyCODONE    No intake or output data in the 24 hours ending 01/05/23 1109      Physical Exam Performed:    /86   Pulse (!) 110   Temp 97.8 °F (36.6 °C) (Oral)   Resp 17   Ht 5' 6\" (1.676 m)   Wt 189 lb 3.2 oz (85.8 kg)   SpO2 96%   BMI 30.54 kg/m²     General appearance: No apparent distress, appears stated age and cooperative. HEENT: Pupils equal, round, and reactive to light. Conjunctivae/corneas clear. Neck: Supple, with full range of motion. No jugular venous distention. Trachea midline. Respiratory:  Normal respiratory effort. Clear to auscultation, bilaterally without Rales/Wheezes/Rhonchi. Cardiovascular: Regular rate and rhythm with normal S1/S2 without murmurs, rubs or gallops. Abdomen: Soft, non-tender, non-distended with normal bowel sounds. Musculoskeletal: No clubbing, cyanosis or edema bilaterally. Full range of motion without deformity. Mid and lower back tenderness  Skin: Skin color, texture, turgor normal.  No rashes or lesions. Neurologic:  Neurovascularly intact without any focal sensory/motor deficits.  Cranial nerves: II-XII intact, grossly non-focal.  Psychiatric: Alert and oriented, thought content appropriate, normal insight  Capillary Refill: Brisk, 3 seconds, normal   Peripheral Pulses: +2 palpable, equal bilaterally       Labs:   Recent Labs     01/03/23  1357 01/04/23  0600 01/05/23  0632   WBC 6.3 5.2 6.3   HGB 13.2 12.0 11.8*   HCT 40.9 36.5 36.4    162 156     Recent Labs     01/03/23  1357 01/04/23  0600 01/05/23  0632   * 133* 137   K 4.6 3.5 4.0   CL 97* 100 100   CO2 23 24 28   BUN 60* 55* 55*   CREATININE 2.4* 2.2* 2.0*   CALCIUM 8.9 8.7 8.7   PHOS  --   --  3.1     Recent Labs     01/03/23  1357 01/05/23  0632   AST 27 21   ALT 18 16   BILITOT 0.9 0.6   ALKPHOS 119 114     Recent Labs     01/03/23  1357 01/04/23  0600 01/05/23  0632   INR 1.95* 2.34* 3.23*     Recent Labs     01/03/23  1357   TROPONINI 0.04*       Urinalysis:      Lab Results   Component Value Date/Time    NITRU Negative 01/03/2023 10:00 PM    WBCUA 4 01/03/2023 10:00 PM    BACTERIA None Seen 01/03/2023 10:00 PM    RBCUA 0 01/03/2023 10:00 PM    BLOODU Negative 01/03/2023 10:00 PM    SPECGRAV 1.010 01/03/2023 10:00 PM    GLUCOSEU Negative 01/03/2023 10:00 PM       Radiology:  CT HEAD WO CONTRAST   Final Result   1. No acute intracranial findings. 2. Chronic microvascular ischemic changes and prior infarcts involving the   left frontal, right parietal and right occipital lobes which appears similar   to the prior study. 3. Mucosal thickening in the right maxillary sinus. 4. Stable 1.2 cm right frontal meningioma. CT ABDOMEN PELVIS WO CONTRAST Additional Contrast? None   Final Result   1. 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 2. No acute findings elsewhere in the abdomen or pelvis. 3. Decreased size of a left ovarian cyst now measuring up to 7.6 cm versus   8.5 cm previously. Pelvic ultrasound could be performed for further   evaluation. XR CHEST PORTABLE   Final Result   Clear lungs. CT THORACIC SPINE WO CONTRAST    (Results Pending)   CT LUMBAR SPINE WO CONTRAST    (Results Pending)       IP CONSULT TO HOSPITALIST  IP CONSULT TO GI  IP CONSULT TO PHARMACY  IP CONSULT TO NEPHROLOGY  IP CONSULT TO NEUROSURGERY    Assessment/Plan:    Active Hospital Problems    Diagnosis     TORIBIO (acute kidney injury) ([facility] [address] [N17.9]      Priority: Medium     Acute on chronic back pain:  Status post recent Kyphosplasty T5 on 12/8/2022. Right foot numbness and weakness with dorsiflexsion resolved. Follow-up repeat CT T and L-spine. Pain control. Neurosurgery consulted. TORIBIO on CKD 3:   Baseline Scr high 1s to low 2s. Scr on admission 2.4. Trended down to 2.0 with IV hydration. Nephrology consulted: Appreciate input. Off IV fluids and diuretics.     Diarrhea with nausea and vomiting: Currently resolved. GI consult appreciated: Follow-up stool work-up if diarrhea recurs. Stop mag ox. Follow-up TSH. Started on Xifaxan 550 mg 3 times daily for 14 days for possible SIBO. Chronic systolic CHF:  No acute exacerbation. Off IV fluids and diuretics. Bioprosthetic mitral valve replacement: Stable on echo 5/6/2022. Chronic A. fib: Rate controlled on metoprolol and amiodarone. INR supra therapeutic. Continue warfarin per pharmacy dosing. Chronic hypotension: Continue midodrine. T2DM on long-term insulin:  Follow-up A1c. Monitor BG on Lantus/SSI. Chronically elevated troponin:  At baseline; 0.04-0.06. Asymptomatic. EKG: A. fib with nonspecific ST-T changes. Echo 5/6/2022: LVEF 40%. 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 Well-seated bioprosthetic mitral valve. Hypothyroidism: Continue Synthroid. Follow-up TSH. DVT Prophylaxis: Coumadin  Diet: ADULT DIET; Regular; 5 carb choices (75 gm/meal);  Low Fat/Low Chol/High Fiber/2 gm Na  Code Status: Full Code  PT/OT Eval Status: Independent    Dispo -Home once medically stable      Norris Snell MD

## 2023-01-05 NOTE — PROGRESS NOTES
[facility]   Nephrology progress Note  [phone]   [facility]           SUMMARY :  We are following this patient for TORIBIO on CKD    58-year-old female with past medical history of CKD, A. fib, coronary artery disease s/p CABG, mitral valve replacement with bioprosthetic valve in 2018, T2DM, hyperlipidemia, presented to hospital with 2 to 3 weeks history of diarrhea accompanied by nausea vomiting and generalized weakness. She has had poor p.o. intake. For her right lower extremity cellulitis she had been treated with antibiotics. CT abdominal pelvis without IV contrast showed small bowel pneumatosis and air in the mesentery. He follows with me for    SUBJECTIVE:   Patient progress reviewed. The patient is having lower back pain.  by bedside     Physical Exam:    VITALS:  /86   Pulse (!) 110   Temp 97.8 °F (36.6 °C) (Oral)   Resp 17   Ht 5' 6\" (1.676 m)   Wt 189 lb 3.2 oz (85.8 kg)   SpO2 96%   BMI 30.54 kg/m²   BLOOD PRESSURE RANGE:  Systolic (74WMM), QLE:410 , Min:96 , RCX:750   ; Diastolic (38BDI), UGC:41, Min:65, Max:86    24HR INTAKE/OUTPUT:  No intake or output data in the 24 hours ending 01/05/23 1114    Gen:  alert, oriented x 3  PERRL , EOM +  Pallor +, No icterus  JVP not raised   CV: RRR no murmur or rub . Paced, Defibrillator- pacemaker +  Mid sternal scar of CABG   Systolic murmur at base of heart   Lungs:B/ L air entry, Normal breath sounds   Abd: soft, bowel sounds + , No organomegaly   Ext: No edema, no cyanosis  Skin: Warm.   No rash  Neuro: nonfocal.    DATA:    CBC with Differential:    Lab Results   Component Value Date/Time    WBC 6.3 01/05/2023 06:32 AM    RBC 4.00 01/05/2023 06:32 AM    HGB 11.8 01/05/2023 06:32 AM    HCT 36.4 01/05/2023 06:32 AM     01/05/2023 06:32 AM    MCV 91.1 01/05/2023 06:32 AM    MCH 29.6 01/05/2023 06:32 AM    MCHC 32.5 01/05/2023 06:32 AM    RDW 16.1 01/05/2023 06:32 AM    SEGSPCT 64.1 09/02/2020 11:37 AM    BANDSPCT 1 01/14/2019 07:26 PM    LYMPHOPCT 13.5 01/05/2023 06:32 AM    MONOPCT 12.9 01/05/2023 06:32 AM    BASOPCT 0.5 01/05/2023 06:32 AM    MONOSABS 0.8 01/05/2023 06:32 AM    LYMPHSABS 0.9 01/05/2023 06:32 AM    EOSABS 0.2 01/05/2023 06:32 AM    BASOSABS 0.0 01/05/2023 06:32 AM     CMP:    Lab Results   Component Value Date/Time     01/05/2023 06:32 AM    K 4.0 01/05/2023 06:32 AM    K 3.5 01/04/2023 06:00 AM     01/05/2023 06:32 AM    CO2 28 01/05/2023 06:32 AM    BUN 55 01/05/2023 06:32 AM    CREATININE 2.0 01/05/2023 06:32 AM    GFRAA 31 09/06/2022 08:49 AM    AGRATIO 0.8 01/05/2023 06:32 AM    LABGLOM 25 01/05/2023 06:32 AM    GLUCOSE 94 01/05/2023 06:32 AM    PROT 5.8 01/05/2023 06:32 AM    LABALBU 2.6 01/05/2023 06:32 AM    CALCIUM 8.7 01/05/2023 06:32 AM    BILITOT 0.6 01/05/2023 06:32 AM    ALKPHOS 114 01/05/2023 06:32 AM    AST 21 01/05/2023 06:32 AM    ALT 16 01/05/2023 06:32 AM     Magnesium:    Lab Results   Component Value Date/Time    MG 2.30 01/05/2023 06:32 AM     Phosphorus:    Lab Results   Component Value Date/Time    PHOS 3.1 01/05/2023 06:32 AM     Troponin:    Lab Results   Component Value Date/Time    TROPONINI 0.04 01/03/2023 01:57 PM     U/A:    Lab Results   Component Value Date/Time    NITRITE neg 10/17/2022 09:15 AM    COLORU Yellow 01/03/2023 10:00 PM    PROTEINU Negative 01/03/2023 10:00 PM    PHUR 5.5 01/03/2023 10:00 PM    WBCUA 4 01/03/2023 10:00 PM    RBCUA 0 01/03/2023 10:00 PM    YEAST neg 02/12/2021 09:45 AM    BACTERIA None Seen 01/03/2023 10:00 PM    CLARITYU CLOUDY 01/03/2023 10:00 PM    SPECGRAV 1.010 01/03/2023 10:00 PM    LEUKOCYTESUR SMALL 01/03/2023 10:00 PM    UROBILINOGEN 1.0 01/03/2023 10:00 PM    BILIRUBINUR Negative 01/03/2023 10:00 PM    BILIRUBINUR neg 10/17/2022 09:15 AM    BLOODU Negative 01/03/2023 10:00 PM    GLUCOSEU Negative 01/03/2023 10:00 PM         IMPRESSION/RECOMMENDATIONS:      Diagnosis and Plan     CKD stage 4  Likely diabetic nephropathy  Baeline Cr 1.9, eGFR 29  TORIBIO with drop in urine output  Likely Pre Renal   Cr lower at 2  sCHF   T2DM   Since 1990  HTN: since 2010     Jani Christopher MD

## 2023-01-05 NOTE — PROGRESS NOTES
Gastroenterology Progress Note      Natalee Ramirez is a 69 y.o. female patient. 1. Nausea vomiting and diarrhea    2. Generalized abdominal pain    3. Right leg weakness    4. Anticoagulated on Coumadin    5. Elevated serum creatinine        SUBJECTIVE:  no BM here. Feels bloated. No N/V or abdominal pain. Passing flatus. Having chronic back pain. ROS:  Cardiovascular ROS: no chest pain or dyspnea on exertion  Gastrointestinal ROS: see hpi  Respiratory ROS: no cough, shortness of breath, or wheezing    Physical    VITALS:  /86   Pulse (!) 110   Temp 97.8 °F (36.6 °C) (Oral)   Resp 17   Ht 5' 6\" (1.676 m)   Wt 189 lb 3.2 oz (85.8 kg)   SpO2 96%   BMI 30.54 kg/m²   TEMPERATURE:  Current - Temp: 97.8 °F (36.6 °C); Max - Temp  Av.7 °F (36.5 °C)  Min: 97.3 °F (36.3 °C)  Max: 98.4 °F (36.9 °C)    NAD  RRR, Nl s1s2  Lungs CTA Bilaterally, normal effort  Abdomen protuberant but soft, ND, NT, no HSM, Bowel sounds normal  AAOx3     Data    Data Review:    Recent Labs     23  1357 23  0600 23  0632   WBC 6.3 5.2 6.3   HGB 13.2 12.0 11.8*   HCT 40.9 36.5 36.4   MCV 92.0 92.0 91.1    162 156     Recent Labs     23  1357 23  0600 23  0632   * 133* 137   K 4.6 3.5 4.0   CL 97* 100 100   CO2 23 24 28   PHOS  --   --  3.1   BUN 60* 55* 55*   CREATININE 2.4* 2.2* 2.0*     Recent Labs     23  1357 23  0632   AST 27 21   ALT 18 16   BILITOT 0.9 0.6   ALKPHOS 119 114     Recent Labs     23  135   LIPASE 29.0     Recent Labs     23  1357 23  0600 23  0632   PROTIME 22.3* 25.7* 33.2*   INR 1.95* 2.34* 3.23*     No results for input(s): PTT in the last 72 hours. ASSESSMENT     Diarrhea, nausea, vomiting -intermittent x2 to 3 weeks. None here. TSH pending. Mag oxide held as this can cause diarrhea. 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   Has been evaluated by surgery in the past for CT findings. Normal lactic acid. PLAN    -Check stool for C.diff, GI bacterial pathogens PCR, and EIA for parasites if has diarrhea  -start xifaxan 550 mg TID x14 days for possible SIBO  - follow up TSH  - follow up outpatient     Discussed with STEPHANIE RomeC  [facility]    I have personally performed a face to face diagnostic evaluation on this patient. I have spent more than 50% of the total clinical encounter in interviewing/examining the patient, reviewing patient chart (including but not limited to notes, labs, imaging and other testing), documentation of findings and subsequent follow up of ordered medication and testing, placing referrals and communication with patient care providers, coordinating future care, as well as documentation in the EHR. I agree with the findings and recommended plan of care, as documented by the physician assistant/nurse practitioner. In summary, my findings and plan are the following:     Patient is tolerating a regular diet. She has no nausea or vomiting. Her diarrhea has resolved. Her last bowel movement was 3 days ago. At baseline, patient tells me she is constipated and takes MiraLAX daily. She stopped using MiraLAX when she had diarrhea a few weeks ago. She feels abdomen is getting more distended. We will get an abdominal x-ray today to make sure patient did not develop constipation or ileus. We will complete 14 days of Xifaxan for suspected SIBO.     Henrietta Lockwood MD, MSc  [facility]  01/05/23

## 2023-01-06 PROBLEM — M54.6 CHRONIC MIDLINE THORACIC BACK PAIN: Status: ACTIVE | Noted: 2023-01-06

## 2023-01-06 PROBLEM — R19.7 DIARRHEA: Status: ACTIVE | Noted: 2023-01-06

## 2023-01-06 PROBLEM — E87.1 HYPONATREMIA: Status: ACTIVE | Noted: 2023-01-06

## 2023-01-06 PROBLEM — R29.898 RIGHT LEG WEAKNESS: Status: ACTIVE | Noted: 2023-01-06

## 2023-01-06 PROBLEM — S22.000A COMPRESSION FRACTURE OF THORACIC VERTEBRA (HCC): Status: ACTIVE | Noted: 2023-01-06

## 2023-01-06 PROBLEM — G89.29 CHRONIC MIDLINE THORACIC BACK PAIN: Status: ACTIVE | Noted: 2023-01-06

## 2023-01-06 PROBLEM — M48.061 LUMBAR STENOSIS: Status: ACTIVE | Noted: 2023-01-06

## 2023-01-06 LAB
A/G RATIO: 0.6 (ref 1.1–2.2)
ALBUMIN SERPL-MCNC: 2.3 G/DL (ref 3.4–5)
ALP BLD-CCNC: 115 U/L (ref 40–129)
ALT SERPL-CCNC: 18 U/L (ref 10–40)
ANION GAP SERPL CALCULATED.3IONS-SCNC: 12 MMOL/L (ref 3–16)
AST SERPL-CCNC: 28 U/L (ref 15–37)
BASOPHILS ABSOLUTE: 0 K/UL (ref 0–0.2)
BASOPHILS RELATIVE PERCENT: 0.3 %
BILIRUB SERPL-MCNC: 0.5 MG/DL (ref 0–1)
BUN BLDV-MCNC: 58 MG/DL (ref 7–20)
CALCIUM SERPL-MCNC: 8.6 MG/DL (ref 8.3–10.6)
CHLORIDE BLD-SCNC: 98 MMOL/L (ref 99–110)
CO2: 20 MMOL/L (ref 21–32)
CREAT SERPL-MCNC: 2 MG/DL (ref 0.6–1.2)
EOSINOPHILS ABSOLUTE: 0.1 K/UL (ref 0–0.6)
EOSINOPHILS RELATIVE PERCENT: 1.5 %
GFR SERPL CREATININE-BSD FRML MDRD: 25 ML/MIN/{1.73_M2}
GLUCOSE BLD-MCNC: 101 MG/DL (ref 70–99)
GLUCOSE BLD-MCNC: 67 MG/DL (ref 70–99)
GLUCOSE BLD-MCNC: 76 MG/DL (ref 70–99)
GLUCOSE BLD-MCNC: 78 MG/DL (ref 70–99)
GLUCOSE BLD-MCNC: 90 MG/DL (ref 70–99)
GLUCOSE BLD-MCNC: 93 MG/DL (ref 70–99)
GLUCOSE BLD-MCNC: 95 MG/DL (ref 70–99)
HCT VFR BLD CALC: 37.1 % (ref 36–48)
HEMOGLOBIN: 12 G/DL (ref 12–16)
INR BLD: 3.61 (ref 0.87–1.14)
LV EF: 30 %
LVEF MODALITY: NORMAL
LYMPHOCYTES ABSOLUTE: 0.9 K/UL (ref 1–5.1)
LYMPHOCYTES RELATIVE PERCENT: 9.5 %
MAGNESIUM: 2.2 MG/DL (ref 1.8–2.4)
MCH RBC QN AUTO: 30.1 PG (ref 26–34)
MCHC RBC AUTO-ENTMCNC: 32.4 G/DL (ref 31–36)
MCV RBC AUTO: 93.1 FL (ref 80–100)
MONOCYTES ABSOLUTE: 0.9 K/UL (ref 0–1.3)
MONOCYTES RELATIVE PERCENT: 10 %
NEUTROPHILS ABSOLUTE: 7.3 K/UL (ref 1.7–7.7)
NEUTROPHILS RELATIVE PERCENT: 78.7 %
PDW BLD-RTO: 16.4 % (ref 12.4–15.4)
PERFORMED ON: ABNORMAL
PERFORMED ON: ABNORMAL
PERFORMED ON: NORMAL
PHOSPHORUS: 3.6 MG/DL (ref 2.5–4.9)
PLATELET # BLD: 151 K/UL (ref 135–450)
PMV BLD AUTO: 8.7 FL (ref 5–10.5)
POTASSIUM SERPL-SCNC: 4.4 MMOL/L (ref 3.5–5.1)
PROTHROMBIN TIME: 36.3 SEC (ref 11.7–14.5)
RBC # BLD: 3.99 M/UL (ref 4–5.2)
SODIUM BLD-SCNC: 130 MMOL/L (ref 136–145)
TOTAL PROTEIN: 5.9 G/DL (ref 6.4–8.2)
WBC # BLD: 9.3 K/UL (ref 4–11)

## 2023-01-06 PROCEDURE — 87336 ENTAMOEB HIST DISPR AG IA: CPT

## 2023-01-06 PROCEDURE — 6370000000 HC RX 637 (ALT 250 FOR IP): Performed by: INTERNAL MEDICINE

## 2023-01-06 PROCEDURE — 80053 COMPREHEN METABOLIC PANEL: CPT

## 2023-01-06 PROCEDURE — 85610 PROTHROMBIN TIME: CPT

## 2023-01-06 PROCEDURE — 1200000000 HC SEMI PRIVATE

## 2023-01-06 PROCEDURE — 87506 IADNA-DNA/RNA PROBE TQ 6-11: CPT

## 2023-01-06 PROCEDURE — 85025 COMPLETE CBC W/AUTO DIFF WBC: CPT

## 2023-01-06 PROCEDURE — 93306 TTE W/DOPPLER COMPLETE: CPT

## 2023-01-06 PROCEDURE — 84100 ASSAY OF PHOSPHORUS: CPT

## 2023-01-06 PROCEDURE — 36415 COLL VENOUS BLD VENIPUNCTURE: CPT

## 2023-01-06 PROCEDURE — 87328 CRYPTOSPORIDIUM AG IA: CPT

## 2023-01-06 PROCEDURE — 6370000000 HC RX 637 (ALT 250 FOR IP): Performed by: NURSE PRACTITIONER

## 2023-01-06 PROCEDURE — 83735 ASSAY OF MAGNESIUM: CPT

## 2023-01-06 RX ORDER — TORSEMIDE 20 MG/1
20 TABLET ORAL DAILY
Status: DISCONTINUED | OUTPATIENT
Start: 2023-01-06 | End: 2023-01-08

## 2023-01-06 RX ADMIN — AMIODARONE HYDROCHLORIDE 100 MG: 200 TABLET ORAL at 08:13

## 2023-01-06 RX ADMIN — METOPROLOL SUCCINATE 50 MG: 50 TABLET, FILM COATED, EXTENDED RELEASE ORAL at 08:13

## 2023-01-06 RX ADMIN — TORSEMIDE 20 MG: 20 TABLET ORAL at 14:16

## 2023-01-06 RX ADMIN — OXYCODONE 5 MG: 5 TABLET ORAL at 08:13

## 2023-01-06 RX ADMIN — INSULIN GLARGINE 16 UNITS: 100 INJECTION, SOLUTION SUBCUTANEOUS at 08:20

## 2023-01-06 RX ADMIN — PANTOPRAZOLE SODIUM 40 MG: 40 TABLET, DELAYED RELEASE ORAL at 05:25

## 2023-01-06 RX ADMIN — ACETAMINOPHEN 1000 MG: 500 TABLET ORAL at 05:25

## 2023-01-06 RX ADMIN — OXYCODONE 5 MG: 5 TABLET ORAL at 18:43

## 2023-01-06 RX ADMIN — MIDODRINE HYDROCHLORIDE 2.5 MG: 5 TABLET ORAL at 18:03

## 2023-01-06 RX ADMIN — ASPIRIN 81 MG: 81 TABLET, CHEWABLE ORAL at 08:13

## 2023-01-06 RX ADMIN — CALCITRIOL 0.25 MCG: 0.25 CAPSULE ORAL at 08:13

## 2023-01-06 RX ADMIN — LEVOTHYROXINE SODIUM 100 MCG: 0.1 TABLET ORAL at 05:25

## 2023-01-06 RX ADMIN — ACETAMINOPHEN 1000 MG: 500 TABLET ORAL at 20:55

## 2023-01-06 RX ADMIN — OXYCODONE 5 MG: 5 TABLET ORAL at 02:14

## 2023-01-06 RX ADMIN — ACETAMINOPHEN 1000 MG: 500 TABLET ORAL at 14:16

## 2023-01-06 ASSESSMENT — PAIN DESCRIPTION - DESCRIPTORS
DESCRIPTORS: ACHING
DESCRIPTORS: ACHING

## 2023-01-06 ASSESSMENT — PAIN DESCRIPTION - LOCATION
LOCATION: BACK

## 2023-01-06 ASSESSMENT — PAIN SCALES - GENERAL
PAINLEVEL_OUTOF10: 6
PAINLEVEL_OUTOF10: 4
PAINLEVEL_OUTOF10: 8
PAINLEVEL_OUTOF10: 7
PAINLEVEL_OUTOF10: 8
PAINLEVEL_OUTOF10: 8

## 2023-01-06 ASSESSMENT — PAIN DESCRIPTION - ORIENTATION: ORIENTATION: LOWER

## 2023-01-06 NOTE — FLOWSHEET NOTE
01/05/23 1937   Assessment   Charting Type Shift assessment   Psychosocial   Psychosocial (WDL) WDL   Neurological   Neuro (WDL) WDL   Paulina Coma Scale   Eye Opening 4   Best Verbal Response 5   Best Motor Response 6   Felton Coma Scale Score 15   HEENT (Head, Ears, Eyes, Nose, & Throat)   HEENT (WDL) X   Right Eye Impaired vision;Glasses   Left Eye Impaired vision;Glasses   Respiratory   Respiratory (WDL) WDL   Cardiac   Cardiac (WDL) X   Cardiac Regularity Irregular   Heart Sounds S1, S2;No adventitious heart sounds   Cardiac Rhythm Atrial fib   Cardiac Symptoms Palpitations   Cardiac Monitor   Cardiac/Telemetry Monitor On Portable telemetry pack applied   Gastrointestinal   Abdominal (WDL) WDL   Genitourinary   Genitourinary (WDL) WDL   Peripheral Vascular   Peripheral Vascular (WDL) X   Edema Right lower extremity; Left lower extremity   RLE Edema Trace   LLE Edema Trace   Skin Integumentary    Skin Integumentary (WDL) X   Skin Color Pale   Skin Condition/Temp Dry;Swollen/edematous; Flaky   Skin Integrity Abrasion   Location scattered   Musculoskeletal   Musculoskeletal (WDL) X   RL Extremity Weakness   LL Extremity Weakness

## 2023-01-06 NOTE — DISCHARGE SUMMARY
[facility] DISCHARGE SUMMARY    Patient Demographics    Patient. Natalee Ramirez  Date of Birth. 1945  MRN. [id number]     Primary care provider. Elder Hoskins MD  (Tel: [phone])    Admit date: 1/3/2023    Discharge date (blank if same as Note Date): Note Date: 1/10/2023     Reason for Hospitalization. Chief Complaint   Patient presents with    Extremity Weakness     Pt states that she has been sick for 2 weeks, pt states that she has been having nausea vomiting since then. Pt states that her right leg is super weak and she can't take steps. Pt states that yesterday she kept part of a sandwich down and today some soup but hasn't kept anything down since the sickness started. Significant Findings. Active Problems:    Chronic midline thoracic back pain    Chronic Compression fracture of thoracic vertebra (HCC)    Diarrhea    Hyponatremia    Lumbar stenosis    Right leg weakness    Cardiomyopathy ([address]    Ileus (HCC)    Anticoagulated on Coumadin    Atrial fibrillation (HCC)    S/P MVR (mitral valve replacement)    S/P CABG x 3    Degenerative disc disease, thoracic    Acute kidney injury superimposed on CKD ([address]  Resolved Problems:    * No resolved hospital problems. *       Problems and results from this hospitalization that need follow up. Chronic back pain  CKD 4  LV dysfunction  Anticoagulated on coumadin  Diarrhea    Significant test results and incidental findings. XR ABDOMEN (KUB) (SINGLE AP VIEW)   Final Result   Findings suggest a diffuse ileus         XR ABDOMEN (KUB) (SINGLE AP VIEW)   Final Result   No radiographic evidence of bowel obstruction or constipation. CT THORACIC SPINE WO CONTRAST   Final Result   1. No acute thoracic spine fracture. 2. Interval vertebroplasty of the T5 compression fracture. The compression   fracture appears stable. Compression fractures from T8 through T11 appear   unchanged.    3. Moderate to advanced multilevel degenerative disc disease. Left posterior   disc bulging or protrusion at the T11-T12 level results in at least moderate   central canal narrowing. 4. Prominent central pulmonary arterial enlargement suggesting pulmonary   hypertension. CT LUMBAR SPINE WO CONTRAST   Final Result   Degenerative changes without acute abnormality. CT HEAD WO CONTRAST   Final Result   1. No acute intracranial findings. 2. Chronic microvascular ischemic changes and prior infarcts involving the   left frontal, right parietal and right occipital lobes which appears similar   to the prior study. 3. Mucosal thickening in the right maxillary sinus. 4. Stable 1.2 cm right frontal meningioma. CT ABDOMEN PELVIS WO CONTRAST Additional Contrast? None   Final Result   1. 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 2. No acute findings elsewhere in the abdomen or pelvis. 3. Decreased size of a left ovarian cyst now measuring up to 7.6 cm versus   8.5 cm previously. Pelvic ultrasound could be performed for further   evaluation. XR CHEST PORTABLE   Final Result   Clear lungs. Invasive procedures and treatments. None     [facility] Course. Kt Hernandez is a 69 y.o. F with h/o CKD, atrial fibrillation, CAD/CABG, mitral valve replacement bioprosthetic 2018, hyperlipidemia, DM 2 who presented with intermittent diarrhea for the last 2 weeks; multiple episodes, loose foul-smelling stools associated with nausea , vomiting and poor appetite. Patient denied any history of C. difficile but recently completed a course of antibiotics for cellulitis. CT in the ER showed small bowel pneumatosis and venous air in the mesentery which is unchanged from prior CT 5/2022. Serum creatinine was also noted to be 2.4 on admission. Worsening Acute on chronic back pain:  Status post recent Kyphosplasty T5 on 12/8/2022.   Right foot numbness and weakness with dorsiflexsion resolved. Repeat CT T and L-spine showed multiple chronic thoracic fractures and L3-4 stenosis. Neurosurgery consult appreciated; No surgical intervention indicated. Pain management consulted but patient prefers to see another doctor. Referred to see Dr. Anita Dos Santos in [place] as desired. TORIBIO on CKD 3:   Baseline Scr high 1s to low 2s. Scr on admission 2.4. Trended down to 2.0. Nephrology consulted: Aldactone discontinued. Torsemide decreased to 10 mg daily. Diarrhea with nausea and vomiting: Currently resolved. GI consult appreciated: Follow-up stool work-up if diarrhea recurs. Stopped mag ox. Started on Xifaxan 550 mg 3 times daily for 14 days for possible SIBO buts discontinued due to cost.        Chronic systolic CHF: No acute exacerbation. Resumed Torsemide at 10 mg daily. Bioprosthetic mitral valve replacement: Stable on echo 5/6/2022. Chronic A. fib: Rate controlled on metoprolol and amiodarone. Continued Coumadin. Chronic hypotension: Continued midodrine. T2DM on long-term insulin: A1c 5.7%. Discharged on home medications. Chronically elevated troponin:  At baseline; 0.04-0.06. Asymptomatic. EKG: A. fib with nonspecific ST-T changes. Echo 5/6/2022: LVEF 40%. 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 Well-seated bioprosthetic mitral valve. Echo 1/6/2023: LVEF 30% with diffuse hypokinesis. Cardiology consulted; recommended out patient follow up. Hypothyroidism:   TSH low at 0.10 with elevated Free T4 at 2.9. Synthroid dose decreased from 100 to 87.5 mcg. Repeat TSH in 4-6 weeks. Ileus: Patient without abdominal pain, N/V. Having formed BM. Tolerating diet diet. Xray with mild gaseous distention. Consults.   IP CONSULT TO HOSPITALIST  IP CONSULT TO GI  IP CONSULT TO PHARMACY  IP CONSULT TO NEPHROLOGY  IP CONSULT TO NEUROSURGERY  IP CONSULT TO CARDIOLOGY  IP CONSULT TO PAIN MANAGEMENT    Physical examination on discharge day. /74   Pulse (!) 109   Temp 98.1 °F (36.7 °C) (Oral)   Resp 17   Ht 5' 6\" (1.676 m)   Wt 193 lb 3.2 oz (87.6 kg)   SpO2 94%   BMI 31.18 kg/m²     General appearance: No apparent distress, appears stated age and cooperative. HEENT: Pupils equal, round, and reactive to light. Conjunctivae/corneas clear. Neck: Supple, with full range of motion. No jugular venous distention. Trachea midline. Respiratory:  Normal respiratory effort. Clear to auscultation, bilaterally without Rales/Wheezes/Rhonchi. Cardiovascular: Regular rate and rhythm with normal S1/S2 without murmurs, rubs or gallops. Abdomen: Soft, non-tender, distended with normal bowel sounds. Musculoskeletal: No clubbing, cyanosis or edema bilaterally. Full range of motion without deformity. Mid and lower back tenderness  Skin: Skin color, texture, turgor normal.  No rashes or lesions. Neurologic:  Neurovascularly intact without any focal sensory/motor deficits. Cranial nerves: II-XII intact, grossly non-focal.  Psychiatric: Alert and oriented, thought content appropriate, normal insight  Capillary Refill: Brisk, 3 seconds, normal   Peripheral Pulses: +2 palpable, equal bilaterally               Condition at time of discharge able. Medication instructions provided to patient at discharge. Medication List        START taking these medications      lidocaine 4 % external patch  Place 2 patches onto the skin daily     methocarbamol 750 MG tablet  Commonly known as: ROBAXIN  Take 1 tablet by mouth 4 times daily as needed (back pain)     naloxegol 25 MG Tabs tablet  Commonly known as: MOVANTIK  Take 1 tablet by mouth every morning (before breakfast)  Start taking on: January 11, 2023            CHANGE how you take these medications      amiodarone 200 MG tablet  Commonly known as: CORDARONE  TAKE 1 TABLET DAILY  What changed: See the new instructions.      levothyroxine 175 MCG tablet  Commonly known as: SYNTHROID  Take 0.5 tablets by mouth Daily  Start taking on: January 11, 2023  What changed:   medication strength  how much to take  how to take this  when to take this  additional instructions     oxyCODONE HCl 10 MG immediate release tablet  Commonly known as: OXY-IR  Take 1 tablet by mouth 3 times daily as needed for Pain for up to 20 days.  Max Daily Amount: 30 mg  What changed:   medication strength  how much to take     torsemide 10 MG tablet  Commonly known as: DEMADEX  Take 1 tablet by mouth daily  Start taking on: January 11, 2023  What changed:   medication strength  how much to take  how to take this  when to take this  additional instructions            CONTINUE taking these medications      ACETAMINOPHEN PO     * albuterol (2.5 MG/3ML) 0.083% nebulizer solution  Commonly known as: PROVENTIL  Take 3 mLs by nebulization every 6 hours as needed for Wheezing     * albuterol sulfate  (90 Base) MCG/ACT inhaler  Commonly known as: PROVENTIL;VENTOLIN;PROAIR  USE 2 INHALATIONS EVERY 6 HOURS AS NEEDED FOR WHEEZING     aspirin 81 MG chewable tablet  Take 1 tablet by mouth daily     BD Pen Needle Pauline U/F 32G X 4 MM Misc  Generic drug: Insulin Pen Needle  USE 1 PEN NEEDLE FOUR TIMES A DAY     calcitRIOL 0.25 MCG capsule  Commonly known as: ROCALTROL  1 tablet Monday, Wednesday and Friday     cyanocobalamin 500 MCG tablet  Take 1 tablet by mouth daily     FreeStyle Lancets Misc  1 each by Does not apply route 4 times daily     FreeStyle Lite Mei     FREESTYLE LITE strip  Generic drug: blood glucose test strips  USE TO TEST BLOOD SUGAR FOUR TIMES A DAY     insulin lispro (1 Unit Dial) 100 UNIT/ML Sopn  Commonly known as: HumaLOG KwikPen  USE SLIDING SCALE, 140-199, 2 UNITS, 200-249, 3 UNITS, 250-300, 4 UNITS, GREATER THAN 300, INJECT 5 UNITS     ipratropium-albuterol 0.5-2.5 (3) MG/3ML Soln nebulizer solution  Commonly known as: DUONEB  Inhale 3 mLs into the lungs every 4 hours as needed for Shortness of Breath     Lantus SoloStar 100 UNIT/ML injection pen  Generic drug: insulin glargine  Inject 16 Units into the skin every morning     metOLazone 2.5 MG tablet  Commonly known as: ZAROXOLYN  TAKE 1 TABLET ON TUESDAY AND FRIDAY AND AS DIRECTED     metoprolol succinate 50 MG extended release tablet  Commonly known as: TOPROL XL  TAKE 1 TABLET DAILY     midodrine 2.5 MG tablet  Commonly known as: PROAMATINE  Take 1 tablet by mouth 3 times daily     montelukast 10 MG tablet  Commonly known as: SINGULAIR  TAKE 1 TABLET NIGHTLY     nystatin 776189 UNIT/ML suspension  Commonly known as: MYCOSTATIN  Take 5 mLs by mouth 4 times daily     omeprazole 20 MG delayed release capsule  Commonly known as: PRILOSEC     ondansetron 4 MG tablet  Commonly known as: ZOFRAN  Take 1 tablet by mouth 3 times daily as needed for Nausea or Vomiting     phytonadione 5 MG tablet  Commonly known as: VITAMIN K  Take 1 tablet by mouth once for 1 dose     polyethylene glycol 17 GM/SCOOP powder  Commonly known as: GLYCOLAX     potassium chloride 10 MEQ extended release tablet  Commonly known as: KLOR-CON M  TAKE 1 TABLET DAILY     predniSONE 10 MG tablet  Commonly known as: DELTASONE  TAKE 1 TABLET AS NEEDED (EOSINOPHILIC GASATRITIS)     vitamin D 1.25 MG (75880 UT) Caps capsule  Commonly known as: ERGOCALCIFEROL  TAKE ONE CAPSULE BY MOUTH ONCE WEEKLY     vitamin D 25 MCG (1000 UT) Tabs tablet  Commonly known as: CHOLECALCIFEROL     warfarin 5 MG tablet  Commonly known as: COUMADIN  Take as directed. If you are unsure how to take this medication, talk to your nurse or doctor. Original instructions: TAKE 1 TABLET DAILY           * This list has 2 medication(s) that are the same as other medications prescribed for you. Read the directions carefully, and ask your doctor or other care provider to review them with you.                 STOP taking these medications      magnesium oxide 400 MG tablet  Commonly known as: MAG-OX spironolactone 25 MG tablet  Commonly known as: ALDACTONE               Where to Get Your Medications        These medications were sent to [facility], [address] [place] [address], [id number]      Phone: [phone]   levothyroxine 175 MCG tablet  lidocaine 4 % external patch  methocarbamol 750 MG tablet  naloxegol 25 MG Tabs tablet  oxyCODONE HCl 10 MG immediate release tablet  torsemide 10 MG tablet         Discharge recommendations given to patient. Follow Up. With PCP in 1 week   Disposition. home  Activity. activity as tolerated  Diet: ADULT DIET; Full Liquid      Spent 35 minutes in discharge process.     Signed:  Verónica Schmidt MD     1/10/2023 12:38 PM

## 2023-01-06 NOTE — PROGRESS NOTES
PM assessment completed and medications given. Daughter at bedside. Patient is A&O and denies any needs at this time.

## 2023-01-06 NOTE — CONSULTS
Neurosurgery Consult Note    Reason for Consult:Back pain  Requesting Provider: Dr. Dorian Garcia    Subjective:  Franki Allenph / HPI:  Ed Brandt is a 69 y.o. female patient being seen for complaints of back pain. Patient with chronic back pain and found to have a multiple thoracic fractures of unknown duration but apparently the T5 vertebral fracture had progressed over a year duration. She ultimately had a T5 kyphoplasty by interventional radiologist on . This did improve her pain for about 2 weeks but then she continues to have diffuse thoracolumbar pain. She denies any radicular pain but does have some right foot weakness. patient presented with nausea and vomiting, right leg weakness. She has multiple medical comorbidities including history of blood clots, atrial fibrillation on chronic anticoagulation, history of CABG and mitral valve replacement.     Past Medical History:   Diagnosis Date    Asthma     Atrial fibrillation (HCC)     Eosinophilia     Hemoptysis     HIGH CHOLESTEROL     Hx of blood clots     Hypertension     Irregular heart beat     Other specified gastritis without mention of hemorrhage     Palpitations     Skin cancer     basal and squamous    Type II or unspecified type diabetes mellitus without mention of complication, not stated as uncontrolled      Past Surgical History:   Procedure Laterality Date    BRONCHOSCOPY  2016    Dr. Luh Marie - brushings from [address] [place] [address]  2018    Dr. Freda Angelucci and 5/3 2021    Cardiac cath, cardioversion and rafat     SECTION      CHOLECYSTECTOMY      COLONOSCOPY  2017    Dr. Hilary Sebastian - sigmoid diverticulosis, polypectomies x3    COLONOSCOPY  2014    Dr. Hilary Sebastian - sigmoid diverticulosis, polypectomies x3, internal hemorrhoids    COLONOSCOPY  10/10/2018    w/biopsy performed by Laura Montiel MD at [facility] N/A 2020    COLONOSCOPY DIAGNOSTIC performed by Titus Nichols MD at [address]  08/21/2018    Dr. Dominique Bacon - x3 (LIMA-LAD, L SV-D1-PLV) modified BL MAZE procedure w/obliteration of WAYNE using 45mm AtriClip    INSERTABLE Nannette Adventism Left 08/16/2018    Dr. Pete Wyatt - Jenn Bull SN# [id number] [employer]    IR KYPHOPLASTY THORACIC FIRST LEVEL  12/8/2022    IR KYPHOPLASTY THORACIC FIRST LEVEL 12/8/2022 [facility]    MITRAL VALVE REPLACEMENT  08/21/2018    Dr. Dominique Bacon - 27mm [employer] Cinch tissue valve    PAIN MANAGEMENT PROCEDURE N/A 12/18/2020    C6-C7 MIDLINE  EPIDURAL STEROID INJECTION WITH FLUOROSCOPY performed by Josh Whitley MD at [address] [place] [address] Bilateral 1/18/2021    BILATERAL T11 TRANSFORAMINAL EPIDURAL STEROID INJECTION WITH FLUOROSCOPY performed by Josh Whitley MD at [facility]      TRANSESOPHAGEAL ECHOCARDIOGRAM  08/21/2018    during CABG/MVR    TUNNELED VENOUS CATHETER PLACEMENT Left 08/23/2018    Dr. Denzel Duran -  for HD---since removed    UPPER GASTROINTESTINAL ENDOSCOPY N/A 10/10/2018    w/biopsy performed by Karina Quintanilla MD at [facility] [fluocinolone], Ciprofloxacin, Diovan [valsartan], Flagyl [metronidazole], Metformin and related [metformin and related], Benazepril, Morphine, Saxagliptin, and Levaquin [levofloxacin]    Current Facility-Administered Medications:     warfarin placeholder: dosing by pharmacy, , Other, Mata Bain, Marcelino Braxton MD    acetaminophen (TYLENOL) tablet 1,000 mg, 1,000 mg, Oral, 3 times per day, Marcelino Braxton MD, 1,000 mg at 01/06/23 0525    lidocaine 4 % external patch 2 patch, 2 patch, TransDERmal, Daily, Marcelino Braxton MD, 2 patch at 01/06/23 0813    methocarbamol (ROBAXIN) tablet 750 mg, 750 mg, Oral, 4x Daily PRN, Marcelino Braxton MD    rifAXIMin Ileana Albright) tablet 550 mg, 550 mg, Oral, TID, Jeimy Sierra PA-C, 550 mg at 01/05/23 1437    perflutren lipid microspheres (DEFINITY) injection 1.5 mL, 1.5 mL, IntraVENous, ONCE PRN, Helen Coates MD    albuterol (PROVENTIL) nebulizer solution 2.5 mg, 2.5 mg, Nebulization, Q6H PRN, Lynda Guillaume MD    amiodarone (CORDARONE) tablet 100 mg, 100 mg, Oral, Daily, Lynda Guillaume MD, 100 mg at 01/06/23 0813    aspirin chewable tablet 81 mg, 81 mg, Oral, Daily, Lynda Guillaume MD, 81 mg at 01/06/23 0813    calcitRIOL (ROCALTROL) capsule 0.25 mcg, 0.25 mcg, Oral, Daily, Lynda Guillaume MD, 0.25 mcg at 01/06/23 0813    insulin glargine (LANTUS) injection vial 16 Units, 16 Units, SubCUTAneous, QAM, Lynda Guillaume MD, 16 Units at 01/06/23 0820    insulin lispro (HUMALOG) injection vial 0-8 Units, 0-8 Units, SubCUTAneous, TID , Lynda Guillaume MD    insulin lispro (HUMALOG) injection vial 0-4 Units, 0-4 Units, SubCUTAneous, Nightly, Lynda Guillaume MD    ipratropium-albuterol (DUONEB) nebulizer solution 1 ampule, 1 ampule, Inhalation, Q4H PRN, Lynda Guillaume MD    levothyroxine (SYNTHROID) tablet 100 mcg, 100 mcg, Oral, Daily, Lynda Guillaume MD, 100 mcg at 01/06/23 0525    metoprolol succinate (TOPROL XL) extended release tablet 50 mg, 50 mg, Oral, Daily, Lynda Guillaume MD, 50 mg at 01/06/23 0813    midodrine (PROAMATINE) tablet 2.5 mg, 2.5 mg, Oral, TID WC, Lynda Guillaume MD, 2.5 mg at 01/05/23 1705    pantoprazole (PROTONIX) tablet 40 mg, 40 mg, Oral, QAM AC, Lynda Guillaume MD, 40 mg at 01/06/23 0525    sodium chloride flush 0.9 % injection 5-40 mL, 5-40 mL, IntraVENous, 2 times per day, Lynda Guillaume MD, 10 mL at 01/05/23 2217    sodium chloride flush 0.9 % injection 5-40 mL, 5-40 mL, IntraVENous, PRN, Lynda Guillaume MD    0.9 % sodium chloride infusion, , IntraVENous, PRN, Lynda Guillaume MD    ondansetron (ZOFRAN-ODT) disintegrating tablet 4 mg, 4 mg, Oral, Q8H PRN **OR** ondansetron (ZOFRAN) injection 4 mg, 4 mg, IntraVENous, Q6H PRN, Lynda Guillaume MD    polyethylene glycol (GLYCOLAX) packet 17 g, 17 g, Oral, Daily PRN, Raffaele Estevez MD    dextrose bolus 10% 125 mL, 125 mL, IntraVENous, PRN **OR** dextrose bolus 10% 250 mL, 250 mL, IntraVENous, PRN, Raffaele Estevez MD    glucagon (rDNA) injection 1 mg, 1 mg, SubCUTAneous, PRN, Raffaele Estevez MD    dextrose 10 % infusion, , IntraVENous, Continuous PRN, Raffaele Estevez MD    oxyCODONE (Quintella Blare) immediate release tablet 5 mg, 5 mg, Oral, Q6H PRN, Alva Thompson APRN - CNP, 5 mg at 01/06/23 0813  Social History     Socioeconomic History    Marital status:      Spouse name: Not on file    Number of children: Not on file    Years of education: Not on file    Highest education level: Not on file   Occupational History    Not on file   Tobacco Use    Smoking status: Never    Smokeless tobacco: Never   Vaping Use    Vaping Use: Never used   Substance and Sexual Activity    Alcohol use: No    Drug use: No    Sexual activity: Not on file   Other Topics Concern    Not on file   Social History Narrative    Not on file     Social Determinants of Health     Financial Resource Strain: Not on file   Food Insecurity: Not on file   Transportation Needs: Not on file   Physical Activity: Not on file   Stress: Not on file   Social Connections: Not on file   Intimate Partner Violence: Not on file   Housing Stability: Not on file     Family History   Problem Relation Age of Onset    Cancer Father     Asthma Mother     Hypertension Mother     Heart Disease Mother     High Blood Pressure Mother        ROS: Complete 10 point ROS was obtained with positives in HPI, otherwise:  Pt denies fevers, denies chills. Pt denies chest pain, denies SOB, denies nausea, denies vomiting, denies headache.       PHYSICAL EXAMINATION:  /74   Pulse 82   Temp 97.9 °F (36.6 °C) (Oral)   Resp 17   Ht 5' 6\" (1.676 m)   Wt 192 lb (87.1 kg)   SpO2 96%   BMI 30.99 kg/m²   GENERAL:  elderly female lying in bed in no acute distress  HEENT:  sclera clear and neck supple  NEUROLOGIC:  Awake, alert, oriented to name, place and time. Cranial nerves II-XII are grossly intact. Motor is 5 out of 5 bilaterally except right hip flexor and ADF 4/5.       LABORATORY DATA:   CBC with Differential:    Lab Results   Component Value Date/Time    WBC 9.3 01/06/2023 07:14 AM    RBC 3.99 01/06/2023 07:14 AM    HGB 12.0 01/06/2023 07:14 AM    HCT 37.1 01/06/2023 07:14 AM     01/06/2023 07:14 AM    MCV 93.1 01/06/2023 07:14 AM    MCH 30.1 01/06/2023 07:14 AM    MCHC 32.4 01/06/2023 07:14 AM    RDW 16.4 01/06/2023 07:14 AM    SEGSPCT 64.1 09/02/2020 11:37 AM    BANDSPCT 1 01/14/2019 07:26 PM    LYMPHOPCT 9.5 01/06/2023 07:14 AM    MONOPCT 10.0 01/06/2023 07:14 AM    BASOPCT 0.3 01/06/2023 07:14 AM    MONOSABS 0.9 01/06/2023 07:14 AM    LYMPHSABS 0.9 01/06/2023 07:14 AM    EOSABS 0.1 01/06/2023 07:14 AM    BASOSABS 0.0 01/06/2023 07:14 AM     CMP:    Lab Results   Component Value Date/Time     01/06/2023 07:29 AM    K 4.4 01/06/2023 07:29 AM    K 3.5 01/04/2023 06:00 AM    CL 98 01/06/2023 07:29 AM    CO2 20 01/06/2023 07:29 AM    BUN 58 01/06/2023 07:29 AM    CREATININE 2.0 01/06/2023 07:29 AM    GFRAA 31 09/06/2022 08:49 AM    AGRATIO 0.6 01/06/2023 07:29 AM    LABGLOM 25 01/06/2023 07:29 AM    GLUCOSE 76 01/06/2023 07:29 AM    PROT 5.9 01/06/2023 07:29 AM    LABALBU 2.3 01/06/2023 07:29 AM    CALCIUM 8.6 01/06/2023 07:29 AM    BILITOT 0.5 01/06/2023 07:29 AM    ALKPHOS 115 01/06/2023 07:29 AM    AST 28 01/06/2023 07:29 AM    ALT 18 01/06/2023 07:29 AM     PT/INR:    Lab Results   Component Value Date/Time    PROTIME 36.3 01/06/2023 07:14 AM    INR 3.61 01/06/2023 07:14 AM     PTT:    Lab Results   Component Value Date/Time    APTT 25.6 12/08/2022 09:14 AM   [APTT}  U/A:    Lab Results   Component Value Date/Time    NITRITE neg 10/17/2022 09:15 AM    COLORU Yellow 01/03/2023 10:00 PM    PROTEINU Negative 01/03/2023 10:00 PM    PHUR 5.5 01/03/2023 10:00 PM    WBCUA 4 01/03/2023 10:00 PM    RBCUA 0 01/03/2023 10:00 PM    YEAST neg 02/12/2021 09:45 AM    BACTERIA None Seen 01/03/2023 10:00 PM    CLARITYU CLOUDY 01/03/2023 10:00 PM    SPECGRAV 1.010 01/03/2023 10:00 PM    LEUKOCYTESUR SMALL 01/03/2023 10:00 PM    UROBILINOGEN 1.0 01/03/2023 10:00 PM    BILIRUBINUR Negative 01/03/2023 10:00 PM    BILIRUBINUR neg 10/17/2022 09:15 AM    BLOODU Negative 01/03/2023 10:00 PM    GLUCOSEU Negative 01/03/2023 10:00 PM        IMAGING STUDIES:   CT of the Thoracic-Spine:  Date: 1/5/23 Result:   FINDINGS:   BONES/ALIGNMENT: There is normal spinal alignment. Vertebroplasty has been   performed at the T5 vertebral body. The compression fracture appears stable. A mild compression fractures at T8 and T11 appear unchanged. Fractures at T9   and T10 demonstrate 40-50% height loss and appear unchanged. No acute   fracture is identified. DEGENERATIVE CHANGES: Moderate to advanced multilevel degenerative disc   disease is noted. There is a left predominant disc bulge or protrusion   posteriorly at T11-T12 which results in at least moderate central canal   narrowing. Elsewhere, no central canal narrowing is identified. SOFT TISSUES: There is cardiomegaly. The central pulmonary arteries are   significantly enlarged. The main pulmonary artery measures up to 4.4 cm. Of   cleft at the left atrial appendage is noted. The paraspinal soft tissues are   otherwise unremarkable. Impression   1. No acute thoracic spine fracture. 2. Interval vertebroplasty of the T5 compression fracture. The compression   fracture appears stable. Compression fractures from T8 through T11 appear   unchanged. 3. Moderate to advanced multilevel degenerative disc disease. Left posterior   disc bulging or protrusion at the T11-T12 level results in at least moderate   central canal narrowing. 4. Prominent central pulmonary arterial enlargement suggesting pulmonary   hypertension.          CT of the Lumbar-Sacral Spine:  Date: 1/5/23; Result:   FINDINGS:   BONES/ALIGNMENT: There is normal alignment of the spine. The vertebral body   heights are maintained. No osseous destructive lesion is seen. DEGENERATIVE CHANGES: There is moderate disc space narrowing and endplate   osteophyte formation at the L1/L2 and L5/S1 levels with associated vacuum   phenomena. The remaining disc spaces are relatively well maintained. There   is a large Schmorl's node within the superior endplate of L3. SOFT TISSUES/RETROPERITONEUM: No paraspinal mass is seen. Impression   Degenerative changes without acute abnormality. IMPRESSION/PLAN:  Principal Problem:    TORIBIO (acute kidney injury) ([facility] [address]      Patient with chronic back pain with multiple chronic thoracic fractures without any new fractures seen. No neurosurgical intervention indicated. Discussed with patient and her family options of referral to chronic pain specialist for possible palliative injections or defer ongoing pain medication at the discretion of her primary care provider. Patient does have right leg weakness and noncontrast lumbar CT suggests some stenosis at L3-4. If weakness would persist could consider CT myelogram as she is unable to have an MRI. given patient's multiple medical comorbidities uncertain patient would be a surgical candidate nor is she interested in that option at this time. The patient's symptoms, exam, testing and plan of care have been discussed with Dr. Ricardo Lu. Will sign off  Thank you again for this consultation.     SOLEDAD Caceres - JOSE L  1/6/2023

## 2023-01-06 NOTE — PROGRESS NOTES
[facility]   Nephrology progress Note  [phone]   [facility]           SUMMARY :  We are following this patient for TORIBIO on CKD    60-year-old female with past medical history of CKD, A. fib, coronary artery disease s/p CABG, mitral valve replacement with bioprosthetic valve in 2018, T2DM, hyperlipidemia, presented to hospital with 2 to 3 weeks history of diarrhea accompanied by nausea vomiting and generalized weakness. She has had poor p.o. intake. For her right lower extremity cellulitis she had been treated with antibiotics. CT abdominal pelvis without IV contrast showed small bowel pneumatosis and air in the mesentery. Follows with me     SUBJECTIVE:   Patient progress reviewed. The patient is having lower back pain.  by bedside   1/6/23: swelling ankles ( was on Torsemide 80 mg BID)    Physical Exam:    VITALS:  /74   Pulse 82   Temp 97.9 °F (36.6 °C) (Oral)   Resp 17   Ht 5' 6\" (1.676 m)   Wt 192 lb (87.1 kg)   SpO2 96%   BMI 30.99 kg/m²   BLOOD PRESSURE RANGE:  Systolic (87TKP), CFN:165 , Min:93 , CAQ:227   ; Diastolic (17WAD), YNP:53, Min:62, Max:74    24HR INTAKE/OUTPUT:    Intake/Output Summary (Last 24 hours) at 1/6/2023 1146  Last data filed at 1/5/2023 2248  Gross per 24 hour   Intake 250 ml   Output --   Net 250 ml       Gen:  alert, oriented x 3  PERRL , EOM +  Pallor +, No icterus  JVP not raised   CV: RRR no murmur or rub . Paced, Defibrillator- pacemaker +  Mid sternal scar of CABG   Systolic murmur at base of heart   Lungs:B/ L air entry, Normal breath sounds   Abd: soft, bowel sounds + , No organomegaly   Ext: Leg and ankle  edema, no cyanosis  Skin: Warm.   No rash  Neuro: nonfocal.    DATA:    CBC with Differential:    Lab Results   Component Value Date/Time    WBC 9.3 01/06/2023 07:14 AM    RBC 3.99 01/06/2023 07:14 AM    HGB 12.0 01/06/2023 07:14 AM    HCT 37.1 01/06/2023 07:14 AM     01/06/2023 07:14 AM    MCV 93.1 01/06/2023 07:14 AM    MCH 30.1 01/06/2023 07:14 AM    MCHC 32.4 01/06/2023 07:14 AM    RDW 16.4 01/06/2023 07:14 AM    SEGSPCT 64.1 09/02/2020 11:37 AM    BANDSPCT 1 01/14/2019 07:26 PM    LYMPHOPCT 9.5 01/06/2023 07:14 AM    MONOPCT 10.0 01/06/2023 07:14 AM    BASOPCT 0.3 01/06/2023 07:14 AM    MONOSABS 0.9 01/06/2023 07:14 AM    LYMPHSABS 0.9 01/06/2023 07:14 AM    EOSABS 0.1 01/06/2023 07:14 AM    BASOSABS 0.0 01/06/2023 07:14 AM     CMP:    Lab Results   Component Value Date/Time     01/06/2023 07:29 AM    K 4.4 01/06/2023 07:29 AM    K 3.5 01/04/2023 06:00 AM    CL 98 01/06/2023 07:29 AM    CO2 20 01/06/2023 07:29 AM    BUN 58 01/06/2023 07:29 AM    CREATININE 2.0 01/06/2023 07:29 AM    GFRAA 31 09/06/2022 08:49 AM    AGRATIO 0.6 01/06/2023 07:29 AM    LABGLOM 25 01/06/2023 07:29 AM    GLUCOSE 76 01/06/2023 07:29 AM    PROT 5.9 01/06/2023 07:29 AM    LABALBU 2.3 01/06/2023 07:29 AM    CALCIUM 8.6 01/06/2023 07:29 AM    BILITOT 0.5 01/06/2023 07:29 AM    ALKPHOS 115 01/06/2023 07:29 AM    AST 28 01/06/2023 07:29 AM    ALT 18 01/06/2023 07:29 AM     Magnesium:    Lab Results   Component Value Date/Time    MG 2.20 01/06/2023 07:29 AM     Phosphorus:    Lab Results   Component Value Date/Time    PHOS 3.6 01/06/2023 07:29 AM     Troponin:    Lab Results   Component Value Date/Time    TROPONINI 0.04 01/03/2023 01:57 PM     U/A:    Lab Results   Component Value Date/Time    NITRITE neg 10/17/2022 09:15 AM    COLORU Yellow 01/03/2023 10:00 PM    PROTEINU Negative 01/03/2023 10:00 PM    PHUR 5.5 01/03/2023 10:00 PM    WBCUA 4 01/03/2023 10:00 PM    RBCUA 0 01/03/2023 10:00 PM    YEAST neg 02/12/2021 09:45 AM    BACTERIA None Seen 01/03/2023 10:00 PM    CLARITYU CLOUDY 01/03/2023 10:00 PM    SPECGRAV 1.010 01/03/2023 10:00 PM    LEUKOCYTESUR SMALL 01/03/2023 10:00 PM    UROBILINOGEN 1.0 01/03/2023 10:00 PM    BILIRUBINUR Negative 01/03/2023 10:00 PM    BILIRUBINUR neg 10/17/2022 09:15 AM    BLOODU Negative 01/03/2023 10:00 PM    GLUCOSEU Negative 01/03/2023 10:00 PM         IMPRESSION/RECOMMENDATIONS:      Diagnosis and Plan     CKD stage 4  Likely diabetic nephropathy  Baseline Cr 1.9, eGFR 29  TORIBIO with drop in urine output  Likely Pre Renal   Cr 2 , stable   sCHF   T2DM   Since 1990  HTN: since 2010   Well controlled   Restart diuretics, monitor     Edil Alvarez MD

## 2023-01-06 NOTE — PROGRESS NOTES
Patient assessed this AM. Alert and oriented. Vitals stable. Scheduled medications given and tolerated. Back pain 8/10. PRN Oxycodone given. Voiced no further concerns. Call light within reach.

## 2023-01-06 NOTE — RT PROTOCOL NOTE
RT Inhaler-Nebulizer Bronchodilator Protocol Note    There is a bronchodilator order in the chart from a provider indicating to follow the RT Bronchodilator Protocol and there is an Initiate RT Inhaler-Nebulizer Bronchodilator Protocol order as well (see protocol at bottom of note). CXR Findings:  No results found. The findings from the last RT Protocol Assessment were as follows:   History Pulmonary Disease: Chronic pulmonary disease  Respiratory Pattern: Regular pattern and RR 12-20 bpm  Breath Sounds: Clear breath sounds  Cough: Strong, spontaneous, non-productive  Indication for Bronchodilator Therapy:    Bronchodilator Assessment Score: 2    Aerosolized bronchodilator medication orders have been revised according to the RT Inhaler-Nebulizer Bronchodilator Protocol below. Respiratory Therapist to perform RT Therapy Protocol Assessment initially then follow the protocol. Repeat RT Therapy Protocol Assessment PRN for score 0-3 or on second treatment, BID, and PRN for scores above 3. No Indications - adjust the frequency to every 6 hours PRN wheezing or bronchospasm, if no treatments needed after 48 hours then discontinue using Per Protocol order mode. If indication present, adjust the RT bronchodilator orders based on the Bronchodilator Assessment Score as indicated below. Use Inhaler orders unless patient has one or more of the following: on home nebulizer, not able to hold breath for 10 seconds, is not alert and oriented, cannot activate and use MDI correctly, or respiratory rate 25 breaths per minute or more, then use the equivalent nebulizer order(s) with same Frequency and PRN reasons based on the score. If a patient is on this medication at home then do not decrease Frequency below that used at home.     0-3 - enter or revise RT bronchodilator order(s) to equivalent RT Bronchodilator order with Frequency of every 4 hours PRN for wheezing or increased work of breathing using Per Protocol order mode. 4-6 - enter or revise RT Bronchodilator order(s) to two equivalent RT bronchodilator orders with one order with BID Frequency and one order with Frequency of every 4 hours PRN wheezing or increased work of breathing using Per Protocol order mode. 7-10 - enter or revise RT Bronchodilator order(s) to two equivalent RT bronchodilator orders with one order with TID Frequency and one order with Frequency of every 4 hours PRN wheezing or increased work of breathing using Per Protocol order mode. 11-13 - enter or revise RT Bronchodilator order(s) to one equivalent RT bronchodilator order with QID Frequency and an Albuterol order with Frequency of every 4 hours PRN wheezing or increased work of breathing using Per Protocol order mode. Greater than 13 - enter or revise RT Bronchodilator order(s) to one equivalent RT bronchodilator order with every 4 hours Frequency and an Albuterol order with Frequency of every 2 hours PRN wheezing or increased work of breathing using Per Protocol order mode. RT to enter RT Home Evaluation for COPD & MDI Assessment order using Per Protocol order mode.     Electronically signed by Abran Morris RCP on 1/6/2023 at 4:04 PM

## 2023-01-06 NOTE — PROGRESS NOTES
Gastroenterology Progress Note      Andres Mesa is a 69 y.o. female patient. 1. Nausea vomiting and diarrhea    2. Generalized abdominal pain    3. Right leg weakness    4. Anticoagulated on Coumadin    5. Elevated serum creatinine        SUBJECTIVE: Had a good sized mushy BM today. Tolerating diet. ROS:  Cardiovascular ROS: no chest pain or dyspnea on exertion  Gastrointestinal ROS: see hpi  Respiratory ROS: no cough, shortness of breath, or wheezing    Physical    VITALS:  /72   Pulse 86   Temp 97.3 °F (36.3 °C) (Oral)   Resp 16   Ht 5' 6\" (1.676 m)   Wt 192 lb (87.1 kg)   SpO2 97%   BMI 30.99 kg/m²   TEMPERATURE:  Current - Temp: 97.3 °F (36.3 °C); Max - Temp  Av.6 °F (36.4 °C)  Min: 97.2 °F (36.2 °C)  Max: 98 °F (36.7 °C)    NAD  RRR, Nl s1s2  Lungs CTA Bilaterally, normal effort  Abdomen protuberant but soft, ND, NT, no HSM, Bowel sounds normal  AAOx3     Data    Data Review:    Recent Labs     23  0600 23  0632 23  0714   WBC 5.2 6.3 9.3   HGB 12.0 11.8* 12.0   HCT 36.5 36.4 37.1   MCV 92.0 91.1 93.1    156 151       Recent Labs     23  0600 23  0632 23  0729   * 137 130*   K 3.5 4.0 4.4    100 98*   CO2 24 28 20*   PHOS  --  3.1 3.6   BUN 55* 55* 58*   CREATININE 2.2* 2.0* 2.0*       Recent Labs     23  0632 23  0729   AST 21 28   ALT 16 18   BILITOT 0.6 0.5   ALKPHOS 114 115       No results for input(s): LIPASE, AMYLASE in the last 72 hours. Recent Labs     23  0600 23  0632 23  0714   PROTIME 25.7* 33.2* 36.3*   INR 2.34* 3.23* 3.61*       No results for input(s): PTT in the last 72 hours. AXR 23  FINDINGS:   There is no dilated bowel. There is a normal amount of stool in the colon. Surgical clips are noted in the upper abdomen. No abnormal calcifications   are noted. Impression:     No radiographic evidence of bowel obstruction or constipation.         ASSESSMENT Diarrhea, nausea, vomiting -intermittent x2 to 3 weeks. Typically has constipation and takes miralax. Mag oxide held as this can cause diarrhea. No BM then on 3rd day of admission had a mushy BM. Stool studies sent. Started on xifaxan for possible SIBO but not able to afford this outpatient. Unable to take cipro or flagyl d/t allergies. Clinically improved. 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 Has been evaluated by surgery in the past for CT findings. Normal lactic acid. PLAN    -follow up stool for C.diff and EIA for parasites   - discussed adding home miralax back at home when ready  - follow up outpatient     Will sign off. Please call if questions. Discussed with Dr. Laurence Fagan PA-C  [facility]     I have personally performed a face to face diagnostic evaluation on this patient. I have spent more than 50% of the total clinical encounter in interviewing/examining the patient, reviewing patient chart (including but not limited to notes, labs, imaging and other testing), documentation of findings and subsequent follow up of ordered medication and testing, placing referrals and communication with patient care providers, coordinating future care, as well as documentation in the EHR. I agree with the findings and recommended plan of care, as documented by the physician assistant/nurse practitioner. In summary, my findings and plan are the following:     Doing well. No abdominal pain, N/V. Finally had a BM. She likely has a viral gastroenteritis that has run its course. Her BM is now back to her baseline constipation. Recommend starting Miralax 17 grams again. May discharge from GI standpoint. GI will sign off. Please call if you have questions.     Laura Montiel MD, MSc  [facility]  01/06/23

## 2023-01-06 NOTE — PROGRESS NOTES
Nadir Zendejas NP    Patient admitted for TORIBIO, which is resolved. Patient is not currently getting anything through her IV. Patient lost IV access and both daughter and patient would not like another one placed at this time. Okay at this time.

## 2023-01-06 NOTE — PROGRESS NOTES
Patient c/o back pain 7/10 on pain scale.  PRN Oxycodone 5 mg given per [facility]

## 2023-01-07 PROBLEM — I42.9 CARDIOMYOPATHY (HCC): Status: ACTIVE | Noted: 2023-01-07

## 2023-01-07 LAB
A/G RATIO: 1 (ref 1.1–2.2)
ALBUMIN SERPL-MCNC: 2.5 G/DL (ref 3.4–5)
ALP BLD-CCNC: 106 U/L (ref 40–129)
ALT SERPL-CCNC: 18 U/L (ref 10–40)
ANION GAP SERPL CALCULATED.3IONS-SCNC: 10 MMOL/L (ref 3–16)
AST SERPL-CCNC: 21 U/L (ref 15–37)
BILIRUB SERPL-MCNC: 0.6 MG/DL (ref 0–1)
BUN BLDV-MCNC: 66 MG/DL (ref 7–20)
CALCIUM SERPL-MCNC: 8 MG/DL (ref 8.3–10.6)
CHLORIDE BLD-SCNC: 100 MMOL/L (ref 99–110)
CO2: 24 MMOL/L (ref 21–32)
CREAT SERPL-MCNC: 2.6 MG/DL (ref 0.6–1.2)
GFR SERPL CREATININE-BSD FRML MDRD: 19 ML/MIN/{1.73_M2}
GI BACTERIAL PATHOGENS BY PCR: NORMAL
GLUCOSE BLD-MCNC: 115 MG/DL (ref 70–99)
GLUCOSE BLD-MCNC: 123 MG/DL (ref 70–99)
GLUCOSE BLD-MCNC: 63 MG/DL (ref 70–99)
GLUCOSE BLD-MCNC: 63 MG/DL (ref 70–99)
GLUCOSE BLD-MCNC: 74 MG/DL (ref 70–99)
GLUCOSE BLD-MCNC: 74 MG/DL (ref 70–99)
INR BLD: 3.95 (ref 0.87–1.14)
MAGNESIUM: 2.1 MG/DL (ref 1.8–2.4)
PERFORMED ON: ABNORMAL
PERFORMED ON: NORMAL
PERFORMED ON: NORMAL
PHOSPHORUS: 3 MG/DL (ref 2.5–4.9)
POTASSIUM SERPL-SCNC: 4 MMOL/L (ref 3.5–5.1)
PROTHROMBIN TIME: 39 SEC (ref 11.7–14.5)
SODIUM BLD-SCNC: 134 MMOL/L (ref 136–145)
TOTAL PROTEIN: 5.1 G/DL (ref 6.4–8.2)

## 2023-01-07 PROCEDURE — 80053 COMPREHEN METABOLIC PANEL: CPT

## 2023-01-07 PROCEDURE — 99223 1ST HOSP IP/OBS HIGH 75: CPT | Performed by: INTERNAL MEDICINE

## 2023-01-07 PROCEDURE — 6370000000 HC RX 637 (ALT 250 FOR IP): Performed by: NURSE PRACTITIONER

## 2023-01-07 PROCEDURE — 6370000000 HC RX 637 (ALT 250 FOR IP): Performed by: INTERNAL MEDICINE

## 2023-01-07 PROCEDURE — 85610 PROTHROMBIN TIME: CPT

## 2023-01-07 PROCEDURE — 84100 ASSAY OF PHOSPHORUS: CPT

## 2023-01-07 PROCEDURE — 83735 ASSAY OF MAGNESIUM: CPT

## 2023-01-07 PROCEDURE — 1200000000 HC SEMI PRIVATE

## 2023-01-07 PROCEDURE — 36415 COLL VENOUS BLD VENIPUNCTURE: CPT

## 2023-01-07 RX ORDER — OXYCODONE HYDROCHLORIDE 5 MG/1
10 TABLET ORAL EVERY 4 HOURS PRN
Status: DISCONTINUED | OUTPATIENT
Start: 2023-01-07 | End: 2023-01-10 | Stop reason: HOSPADM

## 2023-01-07 RX ORDER — OXYCODONE HYDROCHLORIDE 5 MG/1
5 TABLET ORAL EVERY 4 HOURS PRN
Status: DISCONTINUED | OUTPATIENT
Start: 2023-01-07 | End: 2023-01-10 | Stop reason: HOSPADM

## 2023-01-07 RX ADMIN — MIDODRINE HYDROCHLORIDE 2.5 MG: 5 TABLET ORAL at 16:41

## 2023-01-07 RX ADMIN — MIDODRINE HYDROCHLORIDE 2.5 MG: 5 TABLET ORAL at 08:10

## 2023-01-07 RX ADMIN — PANTOPRAZOLE SODIUM 40 MG: 40 TABLET, DELAYED RELEASE ORAL at 06:09

## 2023-01-07 RX ADMIN — MIDODRINE HYDROCHLORIDE 2.5 MG: 5 TABLET ORAL at 11:55

## 2023-01-07 RX ADMIN — LEVOTHYROXINE SODIUM 100 MCG: 0.1 TABLET ORAL at 06:09

## 2023-01-07 RX ADMIN — OXYCODONE 5 MG: 5 TABLET ORAL at 09:17

## 2023-01-07 RX ADMIN — ASPIRIN 81 MG: 81 TABLET, CHEWABLE ORAL at 08:10

## 2023-01-07 RX ADMIN — ACETAMINOPHEN 1000 MG: 500 TABLET ORAL at 14:22

## 2023-01-07 RX ADMIN — TORSEMIDE 20 MG: 20 TABLET ORAL at 08:09

## 2023-01-07 RX ADMIN — OXYCODONE 5 MG: 5 TABLET ORAL at 03:14

## 2023-01-07 RX ADMIN — ONDANSETRON 4 MG: 4 TABLET, ORALLY DISINTEGRATING ORAL at 17:15

## 2023-01-07 RX ADMIN — OXYCODONE 10 MG: 5 TABLET ORAL at 21:06

## 2023-01-07 RX ADMIN — AMIODARONE HYDROCHLORIDE 100 MG: 200 TABLET ORAL at 08:10

## 2023-01-07 RX ADMIN — ACETAMINOPHEN 1000 MG: 500 TABLET ORAL at 06:09

## 2023-01-07 RX ADMIN — CALCITRIOL 0.25 MCG: 0.25 CAPSULE ORAL at 08:10

## 2023-01-07 RX ADMIN — ACETAMINOPHEN 1000 MG: 500 TABLET ORAL at 21:06

## 2023-01-07 RX ADMIN — OXYCODONE 10 MG: 5 TABLET ORAL at 13:09

## 2023-01-07 RX ADMIN — METOPROLOL SUCCINATE 50 MG: 50 TABLET, FILM COATED, EXTENDED RELEASE ORAL at 08:09

## 2023-01-07 ASSESSMENT — PAIN DESCRIPTION - LOCATION
LOCATION: BACK

## 2023-01-07 ASSESSMENT — PAIN SCALES - GENERAL
PAINLEVEL_OUTOF10: 7
PAINLEVEL_OUTOF10: 10
PAINLEVEL_OUTOF10: 8
PAINLEVEL_OUTOF10: 6
PAINLEVEL_OUTOF10: 9
PAINLEVEL_OUTOF10: 8

## 2023-01-07 ASSESSMENT — PAIN DESCRIPTION - ORIENTATION
ORIENTATION: LOWER;MID;UPPER
ORIENTATION: MID

## 2023-01-07 ASSESSMENT — PAIN DESCRIPTION - DESCRIPTORS: DESCRIPTORS: STABBING;SPASM

## 2023-01-07 NOTE — PROGRESS NOTES
[facility]   Nephrology progress Note  [phone]   [facility]           SUMMARY :  We are following this patient for TORIBIO on CKD    81-year-old female with past medical history of CKD, A. fib, coronary artery disease s/p CABG, mitral valve replacement with bioprosthetic valve in 2018, T2DM, hyperlipidemia, presented to hospital with 2 to 3 weeks history of diarrhea accompanied by nausea vomiting and generalized weakness. She has had poor p.o. intake. For her right lower extremity cellulitis she had been treated with antibiotics. CT abdominal pelvis without IV contrast showed small bowel pneumatosis and air in the mesentery. Follows with me     SUBJECTIVE:   Patient progress reviewed. The patient is having lower back pain.  by bedside   1/6/23: swelling ankles ( was on Torsemide 80 mg BID)  1/7/23: less swelling ankles, cardiology consult - p    Physical Exam:    VITALS:  /78   Pulse 81   Temp 97.6 °F (36.4 °C) (Oral)   Resp 18   Ht 5' 6\" (1.676 m)   Wt 192 lb 9.6 oz (87.4 kg)   SpO2 100%   BMI 31.09 kg/m²   BLOOD PRESSURE RANGE:  Systolic (77WZH), AYB:359 , Min:91 , CTB:346   ; Diastolic (89UAF), BBD:17, Min:59, Max:78    24HR INTAKE/OUTPUT:    Intake/Output Summary (Last 24 hours) at 1/7/2023 1018  Last data filed at 1/6/2023 1535  Gross per 24 hour   Intake 480 ml   Output --   Net 480 ml         Gen:  alert, oriented x 3  PERRL , EOM +  Pallor +, No icterus  JVP not raised   CV: RRR no murmur or rub . Paced, Defibrillator- pacemaker +  Mid sternal scar of CABG   Systolic murmur at base of heart   Lungs:B/ L air entry, Normal breath sounds   Abd: soft, bowel sounds + , No organomegaly   Ext: Trace edema , no cyanosis  Skin: Warm.   No rash  Neuro: nonfocal.    DATA:    CBC with Differential:    Lab Results   Component Value Date/Time    WBC 9.3 01/06/2023 07:14 AM    RBC 3.99 01/06/2023 07:14 AM    HGB 12.0 01/06/2023 07:14 AM    HCT 37.1 01/06/2023 07:14 AM     01/06/2023 07:14 AM    MCV 93.1 01/06/2023 07:14 AM    MCH 30.1 01/06/2023 07:14 AM    MCHC 32.4 01/06/2023 07:14 AM    RDW 16.4 01/06/2023 07:14 AM    SEGSPCT 64.1 09/02/2020 11:37 AM    BANDSPCT 1 01/14/2019 07:26 PM    LYMPHOPCT 9.5 01/06/2023 07:14 AM    MONOPCT 10.0 01/06/2023 07:14 AM    BASOPCT 0.3 01/06/2023 07:14 AM    MONOSABS 0.9 01/06/2023 07:14 AM    LYMPHSABS 0.9 01/06/2023 07:14 AM    EOSABS 0.1 01/06/2023 07:14 AM    BASOSABS 0.0 01/06/2023 07:14 AM     CMP:    Lab Results   Component Value Date/Time     01/07/2023 07:12 AM    K 4.0 01/07/2023 07:12 AM    K 3.5 01/04/2023 06:00 AM     01/07/2023 07:12 AM    CO2 24 01/07/2023 07:12 AM    BUN 66 01/07/2023 07:12 AM    CREATININE 2.6 01/07/2023 07:12 AM    GFRAA 31 09/06/2022 08:49 AM    AGRATIO 1.0 01/07/2023 07:12 AM    LABGLOM 19 01/07/2023 07:12 AM    GLUCOSE 63 01/07/2023 07:12 AM    PROT 5.1 01/07/2023 07:12 AM    LABALBU 2.5 01/07/2023 07:12 AM    CALCIUM 8.0 01/07/2023 07:12 AM    BILITOT 0.6 01/07/2023 07:12 AM    ALKPHOS 106 01/07/2023 07:12 AM    AST 21 01/07/2023 07:12 AM    ALT 18 01/07/2023 07:12 AM     Magnesium:    Lab Results   Component Value Date/Time    MG 2.10 01/07/2023 07:12 AM     Phosphorus:    Lab Results   Component Value Date/Time    PHOS 3.0 01/07/2023 07:12 AM     Troponin:    Lab Results   Component Value Date/Time    TROPONINI 0.04 01/03/2023 01:57 PM     U/A:    Lab Results   Component Value Date/Time    NITRITE neg 10/17/2022 09:15 AM    COLORU Yellow 01/03/2023 10:00 PM    PROTEINU Negative 01/03/2023 10:00 PM    PHUR 5.5 01/03/2023 10:00 PM    WBCUA 4 01/03/2023 10:00 PM    RBCUA 0 01/03/2023 10:00 PM    YEAST neg 02/12/2021 09:45 AM    BACTERIA None Seen 01/03/2023 10:00 PM    CLARITYU CLOUDY 01/03/2023 10:00 PM    SPECGRAV 1.010 01/03/2023 10:00 PM    LEUKOCYTESUR SMALL 01/03/2023 10:00 PM    UROBILINOGEN 1.0 01/03/2023 10:00 PM    BILIRUBINUR Negative 01/03/2023 10:00 PM BILIRUBINUR neg 10/17/2022 09:15 AM    BLOODU Negative 01/03/2023 10:00 PM    GLUCOSEU Negative 01/03/2023 10:00 PM         IMPRESSION/RECOMMENDATIONS:      Diagnosis and Plan :     CKD stage 4  Likely diabetic nephropathy  Baseline Cr 1.9, eGFR 29    TORIBIO with drop in urine output  Likely Pre Renal   Cr up at 2.6 , on starting Diuretics , HOld for now    sCHF   T2DM   Since 1990  HTN: since 2010   Well controlled       Louis Leung MD

## 2023-01-07 NOTE — PROGRESS NOTES
Hospitalist Progress Note      PCP: Kamla Ward MD    Date of Admission: 1/3/2023    Chief Complaint: Generalized weakness    Hospital Course:  Nicole Villa is a 69 y.o. F with h/o CKD, atrial fibrillation, CAD/CABG, mitral valve replacement bioprosthetic 2018, hyperlipidemia, DM 2 who presented with intermittent diarrhea for the last 2 weeks; multiple episodes, loose foul-smelling stools associated with nausea , vomiting and poor appetite. Patient denied any history of C. difficile but recently completed a course of antibiotics for cellulitis. CT in the ER showed small bowel pneumatosis and venous air in the mesentery which is unchanged from prior CT 5/2022. Serum creatinine was also noted to be 2.4 on admission. Subjective: Patient seen and examined. No further episodes of diarrhea since admission. No nausea or vomiting. Severe lower back pain, no LE weakness or sensory impairment.         Medications:  Reviewed    Infusion Medications    sodium chloride      dextrose       Scheduled Medications    [START ON 1/8/2023] levothyroxine  87.5 mcg Oral Daily    [Held by provider] torsemide  20 mg Oral Daily    warfarin placeholder: dosing by pharmacy   Other RX Placeholder    acetaminophen  1,000 mg Oral 3 times per day    lidocaine  2 patch TransDERmal Daily    amiodarone  100 mg Oral Daily    aspirin  81 mg Oral Daily    calcitRIOL  0.25 mcg Oral Daily    insulin glargine  16 Units SubCUTAneous QAM    insulin lispro  0-8 Units SubCUTAneous TID WC    insulin lispro  0-4 Units SubCUTAneous Nightly    metoprolol succinate  50 mg Oral Daily    midodrine  2.5 mg Oral TID WC    pantoprazole  40 mg Oral QAM AC    sodium chloride flush  5-40 mL IntraVENous 2 times per day     PRN Meds: oxyCODONE **OR** oxyCODONE, methocarbamol, perflutren lipid microspheres, albuterol, ipratropium-albuterol, sodium chloride flush, sodium chloride, ondansetron **OR** ondansetron, polyethylene glycol, dextrose bolus **OR** dextrose bolus, glucagon (rDNA), dextrose      Intake/Output Summary (Last 24 hours) at 1/7/2023 1037  Last data filed at 1/6/2023 1535  Gross per 24 hour   Intake 480 ml   Output --   Net 480 ml         Physical Exam Performed:    /78   Pulse 81   Temp 97.6 °F (36.4 °C) (Oral)   Resp 18   Ht 5' 6\" (1.676 m)   Wt 192 lb 9.6 oz (87.4 kg)   SpO2 100%   BMI 31.09 kg/m²     General appearance: No apparent distress, appears stated age and cooperative. HEENT: Pupils equal, round, and reactive to light. Conjunctivae/corneas clear. Neck: Supple, with full range of motion. No jugular venous distention. Trachea midline. Respiratory:  Normal respiratory effort. Clear to auscultation, bilaterally without Rales/Wheezes/Rhonchi. Cardiovascular: Regular rate and rhythm with normal S1/S2 without murmurs, rubs or gallops. Abdomen: Soft, non-tender, non-distended with normal bowel sounds. Musculoskeletal: No clubbing, cyanosis or edema bilaterally. Full range of motion without deformity. Mid and lower back tenderness  Skin: Skin color, texture, turgor normal.  No rashes or lesions. Neurologic:  Neurovascularly intact without any focal sensory/motor deficits.  Cranial nerves: II-XII intact, grossly non-focal.  Psychiatric: Alert and oriented, thought content appropriate, normal insight  Capillary Refill: Brisk, 3 seconds, normal   Peripheral Pulses: +2 palpable, equal bilaterally       Labs:   Recent Labs     01/05/23  0632 01/06/23  0714   WBC 6.3 9.3   HGB 11.8* 12.0   HCT 36.4 37.1    151     Recent Labs     01/05/23  0632 01/06/23  0729 01/07/23  0712    130* 134*   K 4.0 4.4 4.0    98* 100   CO2 28 20* 24   BUN 55* 58* 66*   CREATININE 2.0* 2.0* 2.6*   CALCIUM 8.7 8.6 8.0*   PHOS 3.1 3.6 3.0     Recent Labs     01/05/23  0632 01/06/23  0729 01/07/23  0712   AST 21 28 21   ALT 16 18 18   BILITOT 0.6 0.5 0.6   ALKPHOS 114 115 106     Recent Labs     01/05/23  0632 01/06/23  1587 01/07/23  0712   INR 3.23* 3.61* 3.95*     No results for input(s): CKTOTAL, TROPONINI in the last 72 hours. Urinalysis:      Lab Results   Component Value Date/Time    NITRU Negative 01/03/2023 10:00 PM    WBCUA 4 01/03/2023 10:00 PM    BACTERIA None Seen 01/03/2023 10:00 PM    RBCUA 0 01/03/2023 10:00 PM    BLOODU Negative 01/03/2023 10:00 PM    SPECGRAV 1.010 01/03/2023 10:00 PM    GLUCOSEU Negative 01/03/2023 10:00 PM       Radiology:  XR ABDOMEN (KUB) (SINGLE AP VIEW)   Final Result   No radiographic evidence of bowel obstruction or constipation. CT THORACIC SPINE WO CONTRAST   Final Result   1. No acute thoracic spine fracture. 2. Interval vertebroplasty of the T5 compression fracture. The compression   fracture appears stable. Compression fractures from T8 through T11 appear   unchanged. 3. Moderate to advanced multilevel degenerative disc disease. Left posterior   disc bulging or protrusion at the T11-T12 level results in at least moderate   central canal narrowing. 4. Prominent central pulmonary arterial enlargement suggesting pulmonary   hypertension. CT LUMBAR SPINE WO CONTRAST   Final Result   Degenerative changes without acute abnormality. CT HEAD WO CONTRAST   Final Result   1. No acute intracranial findings. 2. Chronic microvascular ischemic changes and prior infarcts involving the   left frontal, right parietal and right occipital lobes which appears similar   to the prior study. 3. Mucosal thickening in the right maxillary sinus. 4. Stable 1.2 cm right frontal meningioma. CT ABDOMEN PELVIS WO CONTRAST Additional Contrast? None   Final Result   1. 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 2. No acute findings elsewhere in the abdomen or pelvis. 3. Decreased size of a left ovarian cyst now measuring up to 7.6 cm versus   8.5 cm previously.   Pelvic ultrasound could be performed for further evaluation. XR CHEST PORTABLE   Final Result   Clear lungs. IP CONSULT TO HOSPITALIST  IP CONSULT TO GI  IP CONSULT TO PHARMACY  IP CONSULT TO NEPHROLOGY  IP CONSULT TO NEUROSURGERY  IP CONSULT TO CARDIOLOGY    Assessment/Plan:    Active Hospital Problems    Diagnosis     Chronic midline thoracic back pain [M54.6, G89.29]      Priority: Medium    Chronic Compression fracture of thoracic vertebra (HCC) [S22.000A]      Priority: Medium    Diarrhea [R19.7]      Priority: Medium    Hyponatremia [E87.1]      Priority: Medium    Lumbar stenosis [M48.061]      Priority: Medium    Right leg weakness [R29.898]      Priority: Medium    Acute kidney injury superimposed on CKD ([facility] [address] [N17.9, N18.9]     Degenerative disc disease, 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 Right foot numbness and weakness with dorsiflexsion resolved. Repeat CT T and L-spine showed multiple chronic thoracic fractures and L3-4 stenosis. Neurosurgery consult appreciated. No surgical intervention indicated. Pain management recommended. TORIBIO on CKD 3:   Baseline Scr high 1s to low 2s. Scr on admission 2.4. Trended down to 2.0 with IV hydration. Torsemide resumed. Aldactone on hold. Nephrology consulted: Appreciate input. Chronically elevated troponin:  At baseline; 0.04-0.06. Asymptomatic. EKG: A. fib with nonspecific ST-T changes. Echo 5/6/2022: LVEF 40%. 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 Well-seated bioprosthetic mitral valve. Echo 1/6/2023: LVEF 30% with diffuse hypokinesis. Cardiology consulted. Hypothyroidism:  TSH suppressed, 0.10 with elevated free T4 2.9. Synthroid dose decreased from 100 to 87.5 mcg. Repeat TSH in 4 to 6 weeks. Diarrhea with nausea and vomiting: Resolved  GI consult appreciated: Follow-up stool work-up if diarrhea recurs.   Started on Xifaxan 550 mg 3 times daily for 14 days for possible COLLEENO.  Outpatient follow-up with GI. Chronic systolic CHF:  No acute exacerbation. Diuretics management per nephrology. Bioprosthetic mitral valve replacement: Stable on echo 5/6/2022. Chronic A. fib: Rate controlled on metoprolol and amiodarone. INR supra therapeutic. Continue warfarin per pharmacy dosing. Chronic hypotension: Continue midodrine. T2DM on long-term insulin:  Follow-up A1c. Monitor BG on Lantus/SSI. DVT Prophylaxis: Coumadin  Diet: ADULT DIET; Regular; 5 carb choices (75 gm/meal);  Low Fat/Low Chol/High Fiber/2 gm Na  Code Status: Full Code  PT/OT Eval Status: Independent    Dispo -Home once medically stable      Radha Valdez MD

## 2023-01-07 NOTE — PROGRESS NOTES
Hospitalist Progress Note      PCP: Kaushal Holguin MD    Date of Admission: 1/3/2023    Chief Complaint: Generalized weakness    Hospital Course:  Gio Morales is a 69 y.o. F with h/o CKD, atrial fibrillation, CAD/CABG, mitral valve replacement bioprosthetic 2018, hyperlipidemia, DM 2 who presented with intermittent diarrhea for the last 2 weeks; multiple episodes, loose foul-smelling stools associated with nausea , vomiting and poor appetite. Patient denied any history of C. difficile but recently completed a course of antibiotics for cellulitis. CT in the ER showed small bowel pneumatosis and venous air in the mesentery which is unchanged from prior CT 5/2022. Serum creatinine was also noted to be 2.4 on admission. Subjective: Patient seen and examined. No further episodes of diarrhea since admission. No nausea or vomiting. Severe lower back pain, no LE weakness or sensory impairment.   Worsening serum creatinine        Medications:  Reviewed    Infusion Medications    sodium chloride      dextrose       Scheduled Medications    [Held by provider] torsemide  20 mg Oral Daily    warfarin placeholder: dosing by pharmacy   Other RX Placeholder    acetaminophen  1,000 mg Oral 3 times per day    lidocaine  2 patch TransDERmal Daily    amiodarone  100 mg Oral Daily    aspirin  81 mg Oral Daily    calcitRIOL  0.25 mcg Oral Daily    insulin glargine  16 Units SubCUTAneous QAM    insulin lispro  0-8 Units SubCUTAneous TID WC    insulin lispro  0-4 Units SubCUTAneous Nightly    levothyroxine  100 mcg Oral Daily    metoprolol succinate  50 mg Oral Daily    midodrine  2.5 mg Oral TID WC    pantoprazole  40 mg Oral QAM AC    sodium chloride flush  5-40 mL IntraVENous 2 times per day     PRN Meds: oxyCODONE **OR** oxyCODONE, methocarbamol, perflutren lipid microspheres, albuterol, ipratropium-albuterol, sodium chloride flush, sodium chloride, ondansetron **OR** ondansetron, polyethylene glycol, dextrose bolus **OR** dextrose bolus, glucagon (rDNA), dextrose      Intake/Output Summary (Last 24 hours) at 1/7/2023 1031  Last data filed at 1/6/2023 1535  Gross per 24 hour   Intake 480 ml   Output --   Net 480 ml         Physical Exam Performed:    /78   Pulse 81   Temp 97.6 °F (36.4 °C) (Oral)   Resp 18   Ht 5' 6\" (1.676 m)   Wt 192 lb 9.6 oz (87.4 kg)   SpO2 100%   BMI 31.09 kg/m²     General appearance: No apparent distress, appears stated age and cooperative. HEENT: Pupils equal, round, and reactive to light. Conjunctivae/corneas clear. Neck: Supple, with full range of motion. No jugular venous distention. Trachea midline. Respiratory:  Normal respiratory effort. Clear to auscultation, bilaterally without Rales/Wheezes/Rhonchi. Cardiovascular: Regular rate and rhythm with normal S1/S2 without murmurs, rubs or gallops. Abdomen: Soft, non-tender, non-distended with normal bowel sounds. Musculoskeletal: No clubbing, cyanosis or edema bilaterally. Full range of motion without deformity. Mid and lower back tenderness  Skin: Skin color, texture, turgor normal.  No rashes or lesions. Neurologic:  Neurovascularly intact without any focal sensory/motor deficits.  Cranial nerves: II-XII intact, grossly non-focal.  Psychiatric: Alert and oriented, thought content appropriate, normal insight  Capillary Refill: Brisk, 3 seconds, normal   Peripheral Pulses: +2 palpable, equal bilaterally       Labs:   Recent Labs     01/05/23  0632 01/06/23  0714   WBC 6.3 9.3   HGB 11.8* 12.0   HCT 36.4 37.1    151     Recent Labs     01/05/23  0632 01/06/23  0729 01/07/23  0712    130* 134*   K 4.0 4.4 4.0    98* 100   CO2 28 20* 24   BUN 55* 58* 66*   CREATININE 2.0* 2.0* 2.6*   CALCIUM 8.7 8.6 8.0*   PHOS 3.1 3.6 3.0     Recent Labs     01/05/23  0632 01/06/23  0729 01/07/23  0712   AST 21 28 21   ALT 16 18 18   BILITOT 0.6 0.5 0.6   ALKPHOS 114 115 106     Recent Labs     01/05/23  0632 01/06/23  9483 01/07/23  0712   INR 3.23* 3.61* 3.95*     No results for input(s): CKTOTAL, TROPONINI in the last 72 hours. Urinalysis:      Lab Results   Component Value Date/Time    NITRU Negative 01/03/2023 10:00 PM    WBCUA 4 01/03/2023 10:00 PM    BACTERIA None Seen 01/03/2023 10:00 PM    RBCUA 0 01/03/2023 10:00 PM    BLOODU Negative 01/03/2023 10:00 PM    SPECGRAV 1.010 01/03/2023 10:00 PM    GLUCOSEU Negative 01/03/2023 10:00 PM       Radiology:  XR ABDOMEN (KUB) (SINGLE AP VIEW)   Final Result   No radiographic evidence of bowel obstruction or constipation. CT THORACIC SPINE WO CONTRAST   Final Result   1. No acute thoracic spine fracture. 2. Interval vertebroplasty of the T5 compression fracture. The compression   fracture appears stable. Compression fractures from T8 through T11 appear   unchanged. 3. Moderate to advanced multilevel degenerative disc disease. Left posterior   disc bulging or protrusion at the T11-T12 level results in at least moderate   central canal narrowing. 4. Prominent central pulmonary arterial enlargement suggesting pulmonary   hypertension. CT LUMBAR SPINE WO CONTRAST   Final Result   Degenerative changes without acute abnormality. CT HEAD WO CONTRAST   Final Result   1. No acute intracranial findings. 2. Chronic microvascular ischemic changes and prior infarcts involving the   left frontal, right parietal and right occipital lobes which appears similar   to the prior study. 3. Mucosal thickening in the right maxillary sinus. 4. Stable 1.2 cm right frontal meningioma. CT ABDOMEN PELVIS WO CONTRAST Additional Contrast? None   Final Result   1. 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 2. No acute findings elsewhere in the abdomen or pelvis. 3. Decreased size of a left ovarian cyst now measuring up to 7.6 cm versus   8.5 cm previously.   Pelvic ultrasound could be performed for further evaluation. XR CHEST PORTABLE   Final Result   Clear lungs. IP CONSULT TO HOSPITALIST  IP CONSULT TO GI  IP CONSULT TO PHARMACY  IP CONSULT TO NEPHROLOGY  IP CONSULT TO NEUROSURGERY  IP CONSULT TO CARDIOLOGY    Assessment/Plan:    Active Hospital Problems    Diagnosis     Chronic midline thoracic back pain [M54.6, G89.29]      Priority: Medium    Chronic Compression fracture of thoracic vertebra (HCC) [S22.000A]      Priority: Medium    Diarrhea [R19.7]      Priority: Medium    Hyponatremia [E87.1]      Priority: Medium    Lumbar stenosis [M48.061]      Priority: Medium    Right leg weakness [R29.898]      Priority: Medium    Acute kidney injury superimposed on CKD ([facility] [address] [N17.9, N18.9]     Degenerative disc disease, 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 Right foot numbness and weakness with dorsiflexsion resolved. Repeat CT T and L-spine showed multiple chronic thoracic fractures and L3-4 stenosis. Neurosurgery consult appreciated. No surgical intervention indicated. Pain management recommended. TORIBIO on CKD 3:   Baseline Scr high 1s to low 2s. Scr on admission 2.4. Trended down to 2.0 with IV hydration. Torsemide resumed with worsening serum creatinine. 2.6 today. Nephrology consulted: Appreciate input. Aldactone on hold. Further recs per nephrology. Chronically elevated troponin:  At baseline; 0.04-0.06. Asymptomatic. EKG: A. fib with nonspecific ST-T changes. Echo 5/6/2022: LVEF 40%. 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 Well-seated bioprosthetic mitral valve. Echo 1/6/2023: LVEF 30% with diffuse hypokinesis. Cardiology consulted. Hypothyroidism:  TSH suppressed, 0.10 with elevated free T4 2.9. Synthroid dose decreased from 100 to 87.5 mcg. Repeat TSH in 4 to 6 weeks. Diarrhea with nausea and vomiting: Resolved  GI consult appreciated:  Follow-up stool work-up if diarrhea recurs. Started on Xifaxan 550 mg 3 times daily for 14 days for possible SIBO. Outpatient follow-up with GI. Chronic systolic CHF:  No acute exacerbation. Diuretics management per nephrology. Bioprosthetic mitral valve replacement: Stable on echo 5/6/2022. Chronic A. fib: Rate controlled on metoprolol and amiodarone. INR supra therapeutic. Continue warfarin per pharmacy dosing. Chronic hypotension: Continue midodrine. T2DM on long-term insulin:  Follow-up A1c. Monitor BG on Lantus/SSI. DVT Prophylaxis: Coumadin  Diet: ADULT DIET; Regular; 5 carb choices (75 gm/meal);  Low Fat/Low Chol/High Fiber/2 gm Na  Code Status: Full Code  PT/OT Eval Status: Independent    Dispo -Home once medically stable      Lauryn Cobian MD

## 2023-01-07 NOTE — PROGRESS NOTES
Pharmacy to Dose Warfarin    Pharmacy consulted to dose warfarin for Afib. INR Goal: 2-3    INR today: 3.95    Assessment/Plan:  - continue to hold warfarin until INR starts trending down  - Possible concomitant drug-drug interactions include: amiodarone     Pharmacy will continue to follow.     Preeti Donald PharmD, BCPS  [id number]  1/7/2023 11:01 AM

## 2023-01-07 NOTE — PLAN OF CARE
Problem: Safety - Adult  Goal: Free from fall injury  Outcome: Progressing     Problem: ABCDS Injury Assessment  Goal: Absence of physical injury  Outcome: Progressing     Problem: Chronic Conditions and Co-morbidities  Goal: Patient's chronic conditions and co-morbidity symptoms are monitored and maintained or improved  Outcome: Progressing     Problem: Pain  Goal: Verbalizes/displays adequate comfort level or baseline comfort level  Outcome: Not Progressing

## 2023-01-07 NOTE — PROGRESS NOTES
Shift assessment completed. Routine vitals stable. Scheduled medications given. Patient is awake, alert and oriented. Respirations are easy and unlabored. Patient expresses back pain, 9/10, understands PRN roxicodone is not due until 0914, okay to wait, lidocaine patches applied. Blood glucose 63 this AM, OJ given by PCA, recheck 74. MD notified. Pt currently eating breakfast, family at bedside. Call light in reach. No other needs expressed.

## 2023-01-07 NOTE — CONSULTS
[facility]  [phone]        Reason for Consultation/Chief Complaint: \"I have been having diarrhea. \"  Consulted for LV dysfunction  Last OV Dr. Kelly Massey 5/10/21 and ADELE FONSECA 11/8/22    History of Present Illness:    Keya Barrera is a 69 y.o. patient who presented to [facility] 1/3/23 with c/o diarrhea. She has PMH CAD s/p 3V CABG 8/18, s/p bio. MVR, HTN, HLD, chronic systolic CHF, hx afib s/p ablation, CV, Maze on coumadin, and hx PE/DVT on coumadin. Testing: Beba nuc 8/18 small, mild intensity lateral ischemia; normal LV function; ECHO 1/23 EF=30%; AK apical segments; severe LAE; mild AS (velocity 2.4m/s; MPG 14mmHg); at least mod AI; s/p bio. MVR without MR; mild-mod TR (EF=40%; AK apex; aneursymal apex in 3/22)   Now admitted with N/V/D with acute on CRI. Diagnosed with viral gastroenteritis and worsening renal fcn likley prerenal and treated accordingly by renal and GI consultants. Diuretics demadex and aldactone held per renal doc. She reports 3 weeks of profuse diarrhea along with N/V. She had stomach upset and vague discomfort also. Patient with no complaints of chest pain, SOB, palpitations, dizziness, edema, or orthopnea/PND. EKG afib 102; PVC; NST change; septal infarct (NSR on 5/22 EKG). CXR clear lungs; CT A/P small bowel pneumatosis in mesentery similar to prior; no acute findings; CT head no acute findings; CT spine degenerative changes; central PA enlargement suggestive pulm HTN. Labs: BUN/Cr=66/2.6, 58/2.0; Farhan 0.04. I have been asked to provide consultation regarding further management and testing.       Past Medical History:   has a past medical history of Asthma, Atrial fibrillation ([address] Eosinophilia, Hemoptysis, HIGH CHOLESTEROL, Hx of blood clots, Hypertension, Irregular heart beat, Other specified gastritis without mention of hemorrhage, Palpitations, Skin cancer, and Type II or unspecified type diabetes mellitus without mention of complication, not stated as uncontrolled. Surgical History:   has a past surgical history that includes Cholecystectomy;  section; Colonoscopy (2017); skin biopsy; bronchoscopy (2016); Coronary artery bypass graft (2018); Mitral valve replacement (2018); transesophageal echocardiogram (2018); Tunneled venous catheter placement (Left, 2018); Cardiac catheterization (2018); Insertable Cardiac Monitor (Left, 2018); Colonoscopy (2014); Upper gastrointestinal endoscopy (N/A, 10/10/2018); Colonoscopy (10/10/2018); Colonoscopy (N/A, 2020); Pain management procedure (N/A, 2020); Pain management procedure (Bilateral, 2021); Cardiac catheterization ( and 5/3 2021); and IR KYPHOPLASTY THORACIC 1 VERTEBRAL BODY (2022). Social History:   reports that she has never smoked. She has never used smokeless tobacco. She reports that she does not drink alcohol and does not use drugs. Family History:  family history includes Asthma in her mother; Cancer in her father; Heart Disease in her mother; High Blood Pressure in her mother; Hypertension in her mother. Home Medications:  Were reviewed and are listed in nursing record. and/or listed below  Prior to Admission medications    Medication Sig Start Date End Date Taking?  Authorizing Provider   phytonadione (VITAMIN K) 5 MG tablet Take 1 tablet by mouth once for 1 dose 22  SOLEDAD Vogel CNP   nystatin (MYCOSTATIN) 754221 UNIT/ML suspension Take 5 mLs by mouth 4 times daily 22   SOLEDAD Vogel CNP   vitamin D (CHOLECALCIFEROL) 25 MCG (1000 UT) TABS tablet Take 1,000 Units by mouth Six times weekly    Historical Provider, MD   insulin glargine (LANTUS SOLOSTAR) 100 UNIT/ML injection pen Inject 16 Units into the skin every morning 22   Claudette Palencia MD   oxyCODONE (ROXICODONE) 5 MG immediate release tablet Take 1 tablet by mouth 3 times daily as needed for Pain for up to 30 days. 12/14/22 1/13/23  Jhonathan Duran MD   levothyroxine (SYNTHROID) 100 MCG tablet TAKE 1 TABLET DAILY 11/16/22   Jhonathan Duran MD   midodrine (PROAMATINE) 2.5 MG tablet Take 1 tablet by mouth 3 times daily 11/16/22   Jhonathan Duran MD   vitamin D (ERGOCALCIFEROL) 1.25 MG (43548 UT) CAPS capsule TAKE ONE CAPSULE BY MOUTH ONCE WEEKLY 11/16/22   Jhonathan Duran MD   calcitRIOL (ROCALTROL) 0.25 MCG capsule 1 tablet Monday, Wednesday and Friday 11/16/22   Jhonathan Duran MD   predniSONE (DELTASONE) 10 MG tablet TAKE 1 TABLET AS NEEDED (EOSINOPHILIC GASATRITIS) 88/8/54   Jhonathan Duran MD   spironolactone (ALDACTONE) 25 MG tablet TAKE 1 TABLET TWICE A DAY 9/15/22   SOLEDAD Montgomery - CNS   blood glucose test strips (FREESTYLE LITE) strip USE TO TEST BLOOD SUGAR FOUR TIMES A DAY 9/15/22   Sherrie Farris MD   torsemide (DEMADEX) 20 MG tablet TAKE 2 TABLETS TWICE A DAY 9/14/22   Jhonathan Duran MD   warfarin (COUMADIN) 5 MG tablet TAKE 1 TABLET DAILY 9/14/22   Jhonathan Duran MD   potassium chloride (KLOR-CON M) 10 MEQ extended release tablet TAKE 1 TABLET DAILY 8/31/22   Jhonathan Duran MD   metoprolol succinate (TOPROL XL) 50 MG extended release tablet TAKE 1 TABLET DAILY 8/31/22   Jhonathan Duran MD   montelukast (SINGULAIR) 10 MG tablet TAKE 1 TABLET NIGHTLY 8/31/22   Jhonathan Duran MD   insulin lispro, 1 Unit Dial, (HUMALOG KWIKPEN) 100 UNIT/ML SOPN USE SLIDING SCALE, 140-199, 2 UNITS, 200-249, 3 UNITS, 250-300, 4 UNITS, GREATER THAN 300, INJECT 5 UNITS 8/15/22   Jhonathan Duran MD   metOLazone (ZAROXOLYN) 2.5 MG tablet TAKE 1 TABLET ON TUESDAY AND FRIDAY AND AS DIRECTED 5/3/22   SOLEDAD Montgomery   amiodarone (CORDARONE) 200 MG tablet TAKE 1 TABLET DAILY  Patient taking differently: Take 100 mg by mouth daily 3/10/22   Nilsa Virk MD   albuterol sulfate  (90 Base) MCG/ACT inhaler USE 2 INHALATIONS EVERY 6 HOURS AS NEEDED FOR WHEEZING 11/19/21 Amber Maya MD   ipratropium-albuterol (DUONEB) 0.5-2.5 (3) MG/3ML SOLN nebulizer solution Inhale 3 mLs into the lungs every 4 hours as needed for Shortness of Breath 11/15/21   Amber Maya MD   FreeStyle Lancets MISC 1 each by Does not apply route 4 times daily 9/7/21   Amber Maya MD   Blood Glucose Monitoring Suppl (FREESTYLE LITE) GAETANO 1 Device by Does not apply route 4 times daily    Historical Provider, MD   Insulin Pen Needle (BD PEN NEEDLE JANNET U/F) 32G X 4 MM MISC USE 1 PEN NEEDLE FOUR TIMES A DAY 6/14/21   Amber Maya MD   magnesium oxide (MAG-OX) 400 MG tablet Take 1 tablet by mouth daily 5/10/21   Rashi Tam MD   ACETAMINOPHEN PO Take 500 mg by mouth every 6 hours as needed     Historical Provider, MD   albuterol (PROVENTIL) (2.5 MG/3ML) 0.083% nebulizer solution Take 3 mLs by nebulization every 6 hours as needed for Wheezing 6/2/20   Amber Maya MD   polyethylene glycol [facility]) 17 GM/SCOOP powder Take 17 g by mouth daily   Patient not taking: Reported on 1/3/2023    Historical Provider, MD   omeprazole (PRILOSEC) 20 MG delayed release capsule Take 20 mg by mouth daily    Historical Provider, MD   ondansetron (ZOFRAN) 4 MG tablet Take 1 tablet by mouth 3 times daily as needed for Nausea or Vomiting 3/26/20   Amber Maya MD   aspirin 81 MG chewable tablet Take 1 tablet by mouth daily 6/7/19   Amber Maya MD   vitamin B-12 500 MCG tablet Take 1 tablet by mouth daily 10/12/18   Tanja Matthews MD        Allergies:  Oqrpkbcr-zjwyquk-rbnsji [fluocinolone], Ciprofloxacin, Diovan [valsartan], Flagyl [metronidazole], Metformin and related [metformin and related], Benazepril, Morphine, Saxagliptin, and Levaquin [levofloxacin]     Review of Systems:   12 point ROS negative in all areas as listed below except as in [address]  Constitutional, EENT, Cardiovascular, pulmonary, GI, , Musculoskeletal, skin, neurological, hematological, endocrine, Psychiatric    Physical Examination:    Vitals:    01/07/23 1141   BP: 118/75   Pulse: 74   Resp: 18   Temp: 97.8 °F (36.6 °C)   SpO2: 96%    Weight: 192 lb 9.6 oz (87.4 kg)         General Appearance:  Alert, cooperative, no distress, appears stated age   Head:  Normocephalic, without obvious abnormality, atraumatic   Eyes:  PERRL, conjunctiva/corneas clear       Nose: Nares normal, no drainage or sinus tenderness   Throat: Lips, mucosa, and tongue normal   Neck: Supple, symmetrical, trachea midline, no adenopathy, thyroid: not enlarged, symmetric, no tenderness/mass/nodules, no carotid bruit or JVD       Lungs:   Clear to auscultation bilaterally, respirations unlabored   Chest Wall:  No tenderness or deformity   Heart:  +irregularly irregular; S1, S2 normal.  ?  Soft SELVIN; no rub or gallop   Abdomen:   Soft, non-tender, bowel sounds active all four quadrants,  no masses, no organomegaly           Extremities: Extremities 1+ BLE edema   Pulses: 2+ and symmetric   Skin: Skin color, texture, turgor normal, no rashes or lesions   Pysch: Normal mood and affect   Neurologic: Normal gross motor and sensory exam.         Labs  CBC:   Lab Results   Component Value Date/Time    WBC 9.3 01/06/2023 07:14 AM    RBC 3.99 01/06/2023 07:14 AM    HGB 12.0 01/06/2023 07:14 AM    HCT 37.1 01/06/2023 07:14 AM    MCV 93.1 01/06/2023 07:14 AM    RDW 16.4 01/06/2023 07:14 AM     01/06/2023 07:14 AM     CMP:    Lab Results   Component Value Date/Time     01/07/2023 07:12 AM    K 4.0 01/07/2023 07:12 AM    K 3.5 01/04/2023 06:00 AM     01/07/2023 07:12 AM    CO2 24 01/07/2023 07:12 AM    BUN 66 01/07/2023 07:12 AM    CREATININE 2.6 01/07/2023 07:12 AM    GFRAA 31 09/06/2022 08:49 AM    AGRATIO 1.0 01/07/2023 07:12 AM    LABGLOM 19 01/07/2023 07:12 AM    GLUCOSE 63 01/07/2023 07:12 AM    PROT 5.1 01/07/2023 07:12 AM    CALCIUM 8.0 01/07/2023 07:12 AM    BILITOT 0.6 01/07/2023 07:12 AM    ALKPHOS 106 01/07/2023 07:12 AM AST 21 01/07/2023 07:12 AM    ALT 18 01/07/2023 07:12 AM     PT/INR:  No results found for: PTINR  Lab Results   Component Value Date    TROPONINI 0.04 (H) 01/03/2023       EKG:  I have reviewed EKG with the following interpretation:  Impression:  See HPI    Beba nuc 8/7/18   Summary    Small sized lateral completely reversible defect of mild intensity    consistent with ischemia in the territory of the LCx and/or LAD . Normal LV function. Overall findings represent a intermediate risk scan. Echo 4/20/21 Drop in EF to 10-15%. AS not severe per JUDE on 5/4/21. Discussion about ICD with Dr. Renee Garcia. Has F/U on 5/18  JUDE 10/21/20> EF 25%  ECHO 9/15/20> EF 25-30%  JUDE 11/1/2019> EF 35-40%    ECHO 1/6/23 Summary   Left ventricular cavity size is normal with mild concentric left ventricular   hypertrophy. Ejection fraction is visually estimated to be 30%. There is severe diffuse global hypokinesis with akinesis of the apex, apical   septum, apical lateral, apical inferior, and apical anterior walls. Cannot determine diastology due to mitral valve replacement. Left atrium is severely dilated. At least Mild aortic stenosis with a peak velocity of 2.4m/s and a mean   pressure gradient of 14mmHg. At least moderate aortic regurgitation. A bioprosthetic mitral valve is well seated with peak velocity of 2.1m/s and   a mean gradient of 7mmHg. No evidence of mitral regurgitation. Mild to moderate tricuspid regurgitation. RVSP is estimated to be 31-34 mmHG. IVC not well visualized. Assessment:  Channing Camacho is a 69 y.o. patient who presented to [facility] 1/3/23 with c/o diarrhea. She has PMH CAD s/p 3V CABG 8/18, s/p bio. MVR, HTN, HLD, chronic systolic CHF, hx afib s/p ablation, CV, Maze on coumadin, and hx PE/DVT on coumadin. Testing: Beba nuc 8/18 small, mild intensity lateral ischemia; normal LV function; ECHO 1/23 EF=30%;  AK apical segments; severe LAE; mild AS (velocity 2.4m/s; MPG 14mmHg); at least mod AI; s/p bio. MVR without MR; mild-mod TR (EF=40%; AK apex; aneursymal apex in 3/22)   Now admitted with N/V/D with acute on CRI. Diagnosed with viral gastroenteritis and worsening renal fcn luisley prerenal and treated accordingly by renal and GI consultants. Diuretics demadex and aldactone held per renal doc. She reports 3 weeks of profuse diarrhea along with N/V. She had stomach upset and vague discomfort also. Patient with no complaints of chest pain, SOB, palpitations, dizziness, edema, or orthopnea/PND. EKG afib 102; PVC; NST change; septal infarct (NSR on 5/22 EKG). CXR clear lungs; CT A/P small bowel pneumatosis in mesentery similar to prior; no acute findings; CT head no acute findings; CT spine degenerative changes; central PA enlargement suggestive pulm HTN. Labs: BUN/Cr=66/2.6, 58/2.0; Farhan 0.04. Diagnosis of cardiomyopathy likely combined ishemic and valvular in elderly female with recent viral GE and acute on CRI with multiple med problems. Recs:   Note EF mildly reduced from 5/22 study but over last few years she has had variable EF results ranging from 15% to 40% range. No signs or symptoms of CHF currently and main issues are GI and renal.  Would restart demadex and aldactone per renal doc recs. Continue toprol XL 50mg, midodrine 2.5mg TID, and amiodarone 100mg qd. No ACE-I, ARB, or ARNI for GDMT due to renal issues. No need for cardiac testing at this time. OK for d/c from cardiology when IM, renal, and GI docs feel OK  She will call for OP f/u appt in our office. Signing off.     Patient Active Problem List   Diagnosis    Long term current use of anticoagulant    Hypercholesteremia    Generalized osteoarthrosis, involving multiple sites    Eosinophilic gastritis    Paroxysmal SVT (supraventricular tachycardia) (HCC)    B12 deficiency    History of pulmonary embolism    Multiple pulmonary nodules    Asthma    PAF (paroxysmal atrial fibrillation) ([facility]    Hypertension Coronary artery disease due to calcified coronary lesion    Nonrheumatic mitral valve regurgitation    Goiter    Primary osteoarthritis of both knees    Splenic infarct    Obesity, Class I, BMI 30-34.9    Chronic systolic CHF (congestive heart failure), NYHA class 3 ([facility])    Degenerative disc disease, thoracic    Persistent atrial fibrillation (HCC)    S/P MVR (mitral valve repair)    Ischemic cardiomyopathy    Occlusion and stenosis of bilateral carotid arteries    Pneumatosis intestinalis    Aortic valve stenosis    Deep vein thrombosis (DVT) of non-extremity vein    Acute on chronic systolic heart failure due to valvular disease ([facility])    Stage 4 chronic kidney disease ([facility])    Acute kidney injury superimposed on CKD ([facility])    Chronic diastolic heart failure (HCC)    Atherosclerosis of superior mesenteric artery ([address]    ICD (implantable cardioverter-defibrillator) in place    Type 2 diabetes mellitus with stage 3b chronic kidney disease, with long-term current use of insulin ([address]    Acquired hypothyroidism    Hypercoagulable state, secondary ([address]    Chronic midline thoracic back pain    Chronic Compression fracture of thoracic vertebra (HCC)    Diarrhea    Hyponatremia    Lumbar stenosis    Right leg weakness        Thank you for allowing to us to participate in the care or Yesenia Neri. Further evaluation will be based upon the patient's clinical course and testing results.

## 2023-01-08 ENCOUNTER — APPOINTMENT (OUTPATIENT)
Dept: GENERAL RADIOLOGY | Age: 78
DRG: 683 | End: 2023-01-08
Payer: MEDICARE

## 2023-01-08 LAB
ALBUMIN SERPL-MCNC: 2.2 G/DL (ref 3.4–5)
ANION GAP SERPL CALCULATED.3IONS-SCNC: 12 MMOL/L (ref 3–16)
BUN BLDV-MCNC: 68 MG/DL (ref 7–20)
CALCIUM SERPL-MCNC: 8.3 MG/DL (ref 8.3–10.6)
CHLORIDE BLD-SCNC: 101 MMOL/L (ref 99–110)
CO2: 20 MMOL/L (ref 21–32)
CREAT SERPL-MCNC: 2.5 MG/DL (ref 0.6–1.2)
CRYPTOSPORIDIUM ANTIGEN STOOL: NORMAL
E HISTOLYTICA ANTIGEN STOOL: NORMAL
GFR SERPL CREATININE-BSD FRML MDRD: 19 ML/MIN/{1.73_M2}
GIARDIA ANTIGEN STOOL: NORMAL
GLUCOSE BLD-MCNC: 134 MG/DL (ref 70–99)
GLUCOSE BLD-MCNC: 156 MG/DL (ref 70–99)
GLUCOSE BLD-MCNC: 161 MG/DL (ref 70–99)
GLUCOSE BLD-MCNC: 95 MG/DL (ref 70–99)
GLUCOSE BLD-MCNC: 99 MG/DL (ref 70–99)
HCT VFR BLD CALC: 37.4 % (ref 36–48)
HEMOGLOBIN: 12.2 G/DL (ref 12–16)
INR BLD: 4.03 (ref 0.87–1.14)
MCH RBC QN AUTO: 30.2 PG (ref 26–34)
MCHC RBC AUTO-ENTMCNC: 32.6 G/DL (ref 31–36)
MCV RBC AUTO: 92.5 FL (ref 80–100)
PDW BLD-RTO: 16 % (ref 12.4–15.4)
PERFORMED ON: ABNORMAL
PERFORMED ON: ABNORMAL
PERFORMED ON: NORMAL
PERFORMED ON: NORMAL
PHOSPHORUS: 4 MG/DL (ref 2.5–4.9)
PLATELET # BLD: 152 K/UL (ref 135–450)
PMV BLD AUTO: 8.8 FL (ref 5–10.5)
POTASSIUM SERPL-SCNC: 4.3 MMOL/L (ref 3.5–5.1)
PROTHROMBIN TIME: 39.6 SEC (ref 11.7–14.5)
RBC # BLD: 4.04 M/UL (ref 4–5.2)
SODIUM BLD-SCNC: 133 MMOL/L (ref 136–145)
WBC # BLD: 5.5 K/UL (ref 4–11)

## 2023-01-08 PROCEDURE — 74018 RADEX ABDOMEN 1 VIEW: CPT

## 2023-01-08 PROCEDURE — 80069 RENAL FUNCTION PANEL: CPT

## 2023-01-08 PROCEDURE — 1200000000 HC SEMI PRIVATE

## 2023-01-08 PROCEDURE — 36415 COLL VENOUS BLD VENIPUNCTURE: CPT

## 2023-01-08 PROCEDURE — 83036 HEMOGLOBIN GLYCOSYLATED A1C: CPT

## 2023-01-08 PROCEDURE — 6370000000 HC RX 637 (ALT 250 FOR IP): Performed by: INTERNAL MEDICINE

## 2023-01-08 PROCEDURE — 85610 PROTHROMBIN TIME: CPT

## 2023-01-08 PROCEDURE — 93005 ELECTROCARDIOGRAM TRACING: CPT | Performed by: INTERNAL MEDICINE

## 2023-01-08 PROCEDURE — 85027 COMPLETE CBC AUTOMATED: CPT

## 2023-01-08 PROCEDURE — 6370000000 HC RX 637 (ALT 250 FOR IP): Performed by: NURSE PRACTITIONER

## 2023-01-08 RX ORDER — PROMETHAZINE HYDROCHLORIDE 25 MG/ML
6.25 INJECTION, SOLUTION INTRAMUSCULAR; INTRAVENOUS EVERY 6 HOURS PRN
Status: DISCONTINUED | OUTPATIENT
Start: 2023-01-08 | End: 2023-01-10 | Stop reason: HOSPADM

## 2023-01-08 RX ADMIN — NALOXEGOL OXALATE 25 MG: 25 TABLET, FILM COATED ORAL at 22:27

## 2023-01-08 RX ADMIN — LEVOTHYROXINE SODIUM 87.5 MCG: 0.07 TABLET ORAL at 05:22

## 2023-01-08 RX ADMIN — ACETAMINOPHEN 1000 MG: 500 TABLET ORAL at 13:07

## 2023-01-08 RX ADMIN — ONDANSETRON 4 MG: 4 TABLET, ORALLY DISINTEGRATING ORAL at 09:32

## 2023-01-08 RX ADMIN — ACETAMINOPHEN 1000 MG: 500 TABLET ORAL at 05:21

## 2023-01-08 RX ADMIN — CALCITRIOL 0.25 MCG: 0.25 CAPSULE ORAL at 09:28

## 2023-01-08 RX ADMIN — OXYCODONE 10 MG: 5 TABLET ORAL at 20:14

## 2023-01-08 RX ADMIN — ASPIRIN 81 MG: 81 TABLET, CHEWABLE ORAL at 09:28

## 2023-01-08 RX ADMIN — OXYCODONE 10 MG: 5 TABLET ORAL at 15:05

## 2023-01-08 RX ADMIN — OXYCODONE 10 MG: 5 TABLET ORAL at 10:41

## 2023-01-08 RX ADMIN — AMIODARONE HYDROCHLORIDE 100 MG: 200 TABLET ORAL at 09:28

## 2023-01-08 RX ADMIN — PANTOPRAZOLE SODIUM 40 MG: 40 TABLET, DELAYED RELEASE ORAL at 05:22

## 2023-01-08 RX ADMIN — ONDANSETRON 4 MG: 4 TABLET, ORALLY DISINTEGRATING ORAL at 17:25

## 2023-01-08 RX ADMIN — MIDODRINE HYDROCHLORIDE 2.5 MG: 5 TABLET ORAL at 17:25

## 2023-01-08 RX ADMIN — OXYCODONE 10 MG: 5 TABLET ORAL at 05:21

## 2023-01-08 RX ADMIN — OXYCODONE 10 MG: 5 TABLET ORAL at 01:12

## 2023-01-08 RX ADMIN — MIDODRINE HYDROCHLORIDE 2.5 MG: 5 TABLET ORAL at 13:06

## 2023-01-08 RX ADMIN — INSULIN GLARGINE 16 UNITS: 100 INJECTION, SOLUTION SUBCUTANEOUS at 09:29

## 2023-01-08 RX ADMIN — MIDODRINE HYDROCHLORIDE 2.5 MG: 5 TABLET ORAL at 09:28

## 2023-01-08 ASSESSMENT — PAIN DESCRIPTION - ONSET
ONSET: ON-GOING

## 2023-01-08 ASSESSMENT — PAIN - FUNCTIONAL ASSESSMENT
PAIN_FUNCTIONAL_ASSESSMENT: PREVENTS OR INTERFERES SOME ACTIVE ACTIVITIES AND ADLS

## 2023-01-08 ASSESSMENT — PAIN SCALES - GENERAL
PAINLEVEL_OUTOF10: 8
PAINLEVEL_OUTOF10: 7
PAINLEVEL_OUTOF10: 8
PAINLEVEL_OUTOF10: 6

## 2023-01-08 ASSESSMENT — PAIN DESCRIPTION - LOCATION
LOCATION: BACK
LOCATION: BACK;GENERALIZED
LOCATION: BACK

## 2023-01-08 ASSESSMENT — PAIN DESCRIPTION - FREQUENCY
FREQUENCY: CONTINUOUS

## 2023-01-08 ASSESSMENT — PAIN DESCRIPTION - DESCRIPTORS
DESCRIPTORS: ACHING;STABBING
DESCRIPTORS: STABBING
DESCRIPTORS: STABBING
DESCRIPTORS: DISCOMFORT
DESCRIPTORS: ACHING;STABBING

## 2023-01-08 ASSESSMENT — PAIN DESCRIPTION - ORIENTATION
ORIENTATION: MID

## 2023-01-08 ASSESSMENT — PAIN DESCRIPTION - PAIN TYPE
TYPE: CHRONIC PAIN

## 2023-01-08 NOTE — PROGRESS NOTES
Shift assessment completed. Pt sitting up in chair, pt had emesis of undigested food after breakfast this AM. Zofran PO given as per PRN order. Pt denied needs at this time.  at bedside, call light in reach.

## 2023-01-08 NOTE — PLAN OF CARE
Problem: Pain  Goal: Verbalizes/displays adequate comfort level or baseline comfort level  Outcome: Progressing  Flowsheets (Taken 1/8/2023 0915)  Verbalizes/displays adequate comfort level or baseline comfort level:   Encourage patient to monitor pain and request assistance   Assess pain using appropriate pain scale   Administer analgesics based on type and severity of pain and evaluate response     Problem: Safety - Adult  Goal: Free from fall injury  Outcome: Progressing     Problem: ABCDS Injury Assessment  Goal: Absence of physical injury  Outcome: Progressing     Problem: Chronic Conditions and Co-morbidities  Goal: Patient's chronic conditions and co-morbidity symptoms are monitored and maintained or improved  Outcome: Progressing  Flowsheets (Taken 1/8/2023 0915)  Care Plan - Patient's Chronic Conditions and Co-Morbidity Symptoms are Monitored and Maintained or Improved:   Monitor and assess patient's chronic conditions and comorbid symptoms for stability, deterioration, or improvement   Collaborate with multidisciplinary team to address chronic and comorbid conditions and prevent exacerbation or deterioration

## 2023-01-08 NOTE — PROGRESS NOTES
[facility]   Nephrology progress Note  [phone]   [facility]. [employer]           SUMMARY :  We are following this patient for TORIBIO on CKD    73-year-old female with past medical history of CKD, A. fib, coronary artery disease s/p CABG, mitral valve replacement with bioprosthetic valve in 2018, T2DM, hyperlipidemia, presented to hospital with 2 to 3 weeks history of diarrhea accompanied by nausea vomiting and generalized weakness. She has had poor p.o. intake. For her right lower extremity cellulitis she had been treated with antibiotics. CT abdominal pelvis without IV contrast showed small bowel pneumatosis and air in the mesentery. Follows with me     SUBJECTIVE:   Patient progress reviewed. The patient is having lower back pain.  by bedside   23: swelling ankles ( was on Torsemide 80 mg BID)  23: less swelling ankles, cardiology consult - p  23: Nausea and vomiting x2 since last night    Physical Exam:    VITALS:  BP 92/62   Pulse 87   Temp 97.3 °F (36.3 °C) (Oral)   Resp 18   Ht 5' 6\" (1.676 m)   Wt 194 lb 8 oz (88.2 kg)   SpO2 98%   BMI 31.39 kg/m²   BLOOD PRESSURE RANGE:  Systolic (71LSH), ZZ , Min:92 , LJM:415   ; Diastolic (07DDE), ZQC:85, Min:62, Max:91    24HR INTAKE/OUTPUT:    Intake/Output Summary (Last 24 hours) at 2023 1123  Last data filed at 2023 0915  Gross per 24 hour   Intake 480 ml   Output 1 ml   Net 479 ml         Gen:  alert, oriented x 3  PERRL , EOM +  Pallor +, No icterus  JVP not raised   CV: RRR no murmur or rub . Paced, Defibrillator- pacemaker +  Mid sternal scar of CABG   Systolic murmur at base of heart   Lungs:B/ L air entry, Normal breath sounds   Abd: soft, bowel sounds + , No organomegaly   Ext: Trace edema , no cyanosis  Skin: Warm.   No rash  Neuro: nonfocal.    DATA:    CBC with Differential:    Lab Results   Component Value Date/Time    WBC 5.5 2023 10:05 AM    RBC 4.04 2023 10:05 AM    HGB 12.2 01/08/2023 10:05 AM    HCT 37.4 01/08/2023 10:05 AM     01/08/2023 10:05 AM    MCV 92.5 01/08/2023 10:05 AM    MCH 30.2 01/08/2023 10:05 AM    MCHC 32.6 01/08/2023 10:05 AM    RDW 16.0 01/08/2023 10:05 AM    SEGSPCT 64.1 09/02/2020 11:37 AM    BANDSPCT 1 01/14/2019 07:26 PM    LYMPHOPCT 9.5 01/06/2023 07:14 AM    MONOPCT 10.0 01/06/2023 07:14 AM    BASOPCT 0.3 01/06/2023 07:14 AM    MONOSABS 0.9 01/06/2023 07:14 AM    LYMPHSABS 0.9 01/06/2023 07:14 AM    EOSABS 0.1 01/06/2023 07:14 AM    BASOSABS 0.0 01/06/2023 07:14 AM     CMP:    Lab Results   Component Value Date/Time     01/08/2023 10:04 AM    K 4.3 01/08/2023 10:04 AM    K 3.5 01/04/2023 06:00 AM     01/08/2023 10:04 AM    CO2 20 01/08/2023 10:04 AM    BUN 68 01/08/2023 10:04 AM    CREATININE 2.5 01/08/2023 10:04 AM    GFRAA 31 09/06/2022 08:49 AM    AGRATIO 1.0 01/07/2023 07:12 AM    LABGLOM 19 01/08/2023 10:04 AM    GLUCOSE 156 01/08/2023 10:04 AM    PROT 5.1 01/07/2023 07:12 AM    LABALBU 2.2 01/08/2023 10:04 AM    CALCIUM 8.3 01/08/2023 10:04 AM    BILITOT 0.6 01/07/2023 07:12 AM    ALKPHOS 106 01/07/2023 07:12 AM    AST 21 01/07/2023 07:12 AM    ALT 18 01/07/2023 07:12 AM     Magnesium:    Lab Results   Component Value Date/Time    MG 2.10 01/07/2023 07:12 AM     Phosphorus:    Lab Results   Component Value Date/Time    PHOS 4.0 01/08/2023 10:04 AM     Troponin:    Lab Results   Component Value Date/Time    TROPONINI 0.04 01/03/2023 01:57 PM     U/A:    Lab Results   Component Value Date/Time    NITRITE neg 10/17/2022 09:15 AM    COLORU Yellow 01/03/2023 10:00 PM    PROTEINU Negative 01/03/2023 10:00 PM    PHUR 5.5 01/03/2023 10:00 PM    WBCUA 4 01/03/2023 10:00 PM    RBCUA 0 01/03/2023 10:00 PM    YEAST neg 02/12/2021 09:45 AM    BACTERIA None Seen 01/03/2023 10:00 PM    CLARITYU CLOUDY 01/03/2023 10:00 PM    SPECGRAV 1.010 01/03/2023 10:00 PM    LEUKOCYTESUR SMALL 01/03/2023 10:00 PM    UROBILINOGEN 1.0 01/03/2023 10:00 PM BILIRUBINUR Negative 01/03/2023 10:00 PM    BILIRUBINUR neg 10/17/2022 09:15 AM    BLOODU Negative 01/03/2023 10:00 PM    GLUCOSEU Negative 01/03/2023 10:00 PM         IMPRESSION/RECOMMENDATIONS:      Diagnosis and Plan :     CKD stage 4  Likely diabetic nephropathy  Baseline Cr 1.9, eGFR 29    TORIBIO with drop in urine output  Likely Pre Renal   Cr 2.5  Hold diuretic  sCHF   T2DM   Since 1990  HTN: since 2010   BP is low         Kyree Damon MD

## 2023-01-08 NOTE — PROGRESS NOTES
Pharmacy to Dose Warfarin    Pharmacy consulted to dose warfarin for Afib. INR Goal: 2-3    INR today: 4.02    Assessment/Plan:  - continue to hold warfarin until INR starts trending down  - Possible concomitant drug-drug interactions include: amiodarone, levothyroxine    Pharmacy will continue to follow.     Brenda CruzD, [facility]  [id number]  1/8/2023 11:29 AM

## 2023-01-08 NOTE — PROGRESS NOTES
Hospitalist Progress Note      PCP: Jere Malone MD    Date of Admission: 1/3/2023    Chief Complaint: Generalized weakness    Hospital Course:  Manolo Richards is a 69 y.o. F with h/o CKD, atrial fibrillation, CAD/CABG, mitral valve replacement bioprosthetic 2018, hyperlipidemia, DM 2 who presented with intermittent diarrhea for the last 2 weeks; multiple episodes, loose foul-smelling stools associated with nausea , vomiting and poor appetite. Patient denied any history of C. difficile but recently completed a course of antibiotics for cellulitis. CT in the ER showed small bowel pneumatosis and venous air in the mesentery which is unchanged from prior CT 5/2022. Serum creatinine was also noted to be 2.4 on admission. Subjective: Patient seen and examined. No further episodes of diarrhea since admission. Nausea or vomiting +. Severe lower back pain, no LE weakness or sensory impairment. Stable Scr.         Medications:  Reviewed    Infusion Medications    sodium chloride      dextrose       Scheduled Medications    levothyroxine  87.5 mcg Oral Daily    [Held by provider] torsemide  20 mg Oral Daily    warfarin placeholder: dosing by pharmacy   Other RX Placeholder    acetaminophen  1,000 mg Oral 3 times per day    lidocaine  2 patch TransDERmal Daily    amiodarone  100 mg Oral Daily    aspirin  81 mg Oral Daily    calcitRIOL  0.25 mcg Oral Daily    insulin glargine  16 Units SubCUTAneous QAM    insulin lispro  0-8 Units SubCUTAneous TID WC    insulin lispro  0-4 Units SubCUTAneous Nightly    [Held by provider] metoprolol succinate  50 mg Oral Daily    midodrine  2.5 mg Oral TID WC    pantoprazole  40 mg Oral QAM AC    sodium chloride flush  5-40 mL IntraVENous 2 times per day     PRN Meds: oxyCODONE **OR** oxyCODONE, methocarbamol, perflutren lipid microspheres, albuterol, ipratropium-albuterol, sodium chloride flush, sodium chloride, ondansetron **OR** ondansetron, polyethylene glycol, dextrose bolus **OR** dextrose bolus, glucagon (rDNA), dextrose      Intake/Output Summary (Last 24 hours) at 1/8/2023 1251  Last data filed at 1/8/2023 0915  Gross per 24 hour   Intake 480 ml   Output 1 ml   Net 479 ml         Physical Exam Performed:    BP (!) 133/94   Pulse 93   Temp 97.3 °F (36.3 °C) (Oral)   Resp 18   Ht 5' 6\" (1.676 m)   Wt 194 lb 8 oz (88.2 kg)   SpO2 96%   BMI 31.39 kg/m²     General appearance: No apparent distress, appears stated age and cooperative. HEENT: Pupils equal, round, and reactive to light. Conjunctivae clear. Neck: Supple, with full range of motion. No jugular venous distention. Trachea midline. Respiratory:  Normal respiratory effort. Clear to auscultation, bilaterally without Rales/Wheezes/Rhonchi. Cardiovascular: Regular rate and rhythm with normal S1/S2 without murmurs, rubs or gallops. Abdomen: Soft, non-tender, non-distended with normal bowel sounds. Musculoskeletal: No clubbing, cyanosis or edema bilaterally. Full range of motion without deformity. Mid and lower back tenderness  Skin: Skin color, texture, turgor normal.  No rashes or lesions. Neurologic:  Neurovascularly intact without any focal sensory/motor deficits.  Cranial nerves: II-XII intact, grossly non-focal.  Psychiatric: Alert and oriented, thought content appropriate, normal insight  Capillary Refill: Brisk, 3 seconds, normal   Peripheral Pulses: +2 palpable, equal bilaterally       Labs:   Recent Labs     01/06/23  0714 01/08/23  1005   WBC 9.3 5.5   HGB 12.0 12.2   HCT 37.1 37.4    152     Recent Labs     01/06/23  0729 01/07/23  0712 01/08/23  1004   * 134* 133*   K 4.4 4.0 4.3   CL 98* 100 101   CO2 20* 24 20*   BUN 58* 66* 68*   CREATININE 2.0* 2.6* 2.5*   CALCIUM 8.6 8.0* 8.3   PHOS 3.6 3.0 4.0     Recent Labs     01/06/23  0729 01/07/23  0712   AST 28 21   ALT 18 18   BILITOT 0.5 0.6   ALKPHOS 115 106     Recent Labs     01/06/23  0714 01/07/23  0712 01/08/23  1005   INR 3.61* 3.95* 4.03*     No results for input(s): Bhavna Morales in the last 72 hours. Urinalysis:      Lab Results   Component Value Date/Time    NITRU Negative 01/03/2023 10:00 PM    WBCUA 4 01/03/2023 10:00 PM    BACTERIA None Seen 01/03/2023 10:00 PM    RBCUA 0 01/03/2023 10:00 PM    BLOODU Negative 01/03/2023 10:00 PM    SPECGRAV 1.010 01/03/2023 10:00 PM    GLUCOSEU Negative 01/03/2023 10:00 PM       Radiology:  XR ABDOMEN (KUB) (SINGLE AP VIEW)   Final Result   No radiographic evidence of bowel obstruction or constipation. CT THORACIC SPINE WO CONTRAST   Final Result   1. No acute thoracic spine fracture. 2. Interval vertebroplasty of the T5 compression fracture. The compression   fracture appears stable. Compression fractures from T8 through T11 appear   unchanged. 3. Moderate to advanced multilevel degenerative disc disease. Left posterior   disc bulging or protrusion at the T11-T12 level results in at least moderate   central canal narrowing. 4. Prominent central pulmonary arterial enlargement suggesting pulmonary   hypertension. CT LUMBAR SPINE WO CONTRAST   Final Result   Degenerative changes without acute abnormality. CT HEAD WO CONTRAST   Final Result   1. No acute intracranial findings. 2. Chronic microvascular ischemic changes and prior infarcts involving the   left frontal, right parietal and right occipital lobes which appears similar   to the prior study. 3. Mucosal thickening in the right maxillary sinus. 4. Stable 1.2 cm right frontal meningioma. CT ABDOMEN PELVIS WO CONTRAST Additional Contrast? None   Final Result   1. 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 2. No acute findings elsewhere in the abdomen or pelvis. 3. Decreased size of a left ovarian cyst now measuring up to 7.6 cm versus   8.5 cm previously. Pelvic ultrasound could be performed for further   evaluation. XR CHEST PORTABLE   Final Result   Clear lungs. XR ABDOMEN (KUB) (SINGLE AP VIEW)    (Results Pending)       IP CONSULT TO HOSPITALIST  IP CONSULT TO GI  IP CONSULT TO PHARMACY  IP CONSULT TO NEPHROLOGY  IP CONSULT TO NEUROSURGERY  IP CONSULT TO CARDIOLOGY    Assessment/Plan:    Active Hospital Problems    Diagnosis     Cardiomyopathy ([facility] [address] [I42.9]      Priority: Medium    Chronic midline thoracic back pain [M54.6, G89.29]      Priority: Medium    Chronic Compression fracture of thoracic vertebra ([facility] [address] [S22.000A]      Priority: Medium    Diarrhea [R19.7]      Priority: Medium    Hyponatremia [E87.1]      Priority: Medium    Lumbar stenosis [M48.061]      Priority: Medium    Right leg weakness [R29.898]      Priority: Medium    Acute kidney injury superimposed on CKD ([facility] [address] [N17.9, N18.9]     Degenerative disc disease, thoracic [M51.34]     S/P MVR (mitral valve replacement) [Z95.2]     S/P CABG x 3 [Z95.1]     Atrial fibrillation ([facility] [address] [I48.91]      Acute on chronic back pain:  Status post recent Kyphosplasty T5 on 12/8/2022. Right foot numbness and weakness with dorsiflexsion resolved. Repeat CT T and L-spine showed multiple chronic thoracic fractures and L3-4 stenosis. Neurosurgery consult appreciated. No surgical intervention indicated. Pain management recommended. TORIBIO on CKD 3:   Baseline Scr high 1s to low 2s. Scr on admission 2.4. Trended down to 2.0 with IV hydration. Torsemide resumed with worsening serum creatinine. 2.5 today. Nephrology consulted: Appreciate input. Aldactone on hold. Further recs per nephrology. Chronically elevated troponin:  At baseline; 0.04-0.06. Asymptomatic. EKG: A. fib with nonspecific ST-T changes. Echo 5/6/2022: LVEF 40%. 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 Well-seated bioprosthetic mitral valve. Echo 1/6/2023: LVEF 30% with diffuse hypokinesis.   Cardiology consulted; recommended out patient follow up.      Hypothyroidism:  TSH suppressed, 0.10 with elevated free T4 2.9. Synthroid dose decreased from 100 to 87.5 mcg. Repeat TSH in 4 to 6 weeks. Diarrhea with nausea and vomiting: Resolved  GI consult appreciated: Follow-up stool work-up if diarrhea recurs. Started on Xifaxan 550 mg 3 times daily for 14 days for possible SIBO. Outpatient follow-up with GI. Follow up KUB. Chronic systolic CHF: No acute exacerbation. Diuretics management per nephrology. Bioprosthetic mitral valve replacement: Stable on echo 5/6/2022. Chronic A. fib: Rate controlled on metoprolol and amiodarone. INR supra therapeutic. Continue warfarin per pharmacy dosing. Chronic Hypotension: Continue midodrine. T2DM on long-term insulin: Follow-up A1c. Monitor BG on Lantus/SSI. DVT Prophylaxis: Coumadin  Diet: ADULT DIET; Regular; 5 carb choices (75 gm/meal);  Low Fat/Low Chol/High Fiber/2 gm Na  Code Status: Full Code  PT/OT Eval Status: Independent    Dispo -Home once medically stable      Austin Bowen MD

## 2023-01-08 NOTE — PROGRESS NOTES
Notified by MT-patient going in and out of second degree block. Patient reporting dizziness but only when ambulating. This RN notified NP-NP at bedside to assess-will continue to monitor. Metoprolol held.

## 2023-01-08 NOTE — PROGRESS NOTES
This RN is resuming care. Scheduled medications given. Pt currently lying in bed, expresses no needs.  at bedside. Call light in reach.

## 2023-01-09 LAB
A/G RATIO: 1.1 (ref 1.1–2.2)
ALBUMIN SERPL-MCNC: 3 G/DL (ref 3.4–5)
ALP BLD-CCNC: 125 U/L (ref 40–129)
ALT SERPL-CCNC: 22 U/L (ref 10–40)
ANION GAP SERPL CALCULATED.3IONS-SCNC: 14 MMOL/L (ref 3–16)
AST SERPL-CCNC: 25 U/L (ref 15–37)
BILIRUB SERPL-MCNC: 0.9 MG/DL (ref 0–1)
BUN BLDV-MCNC: 60 MG/DL (ref 7–20)
CALCIUM SERPL-MCNC: 8.6 MG/DL (ref 8.3–10.6)
CHLORIDE BLD-SCNC: 102 MMOL/L (ref 99–110)
CO2: 20 MMOL/L (ref 21–32)
CREAT SERPL-MCNC: 2.3 MG/DL (ref 0.6–1.2)
EKG ATRIAL RATE: 98 BPM
EKG DIAGNOSIS: NORMAL
EKG Q-T INTERVAL: 394 MS
EKG QRS DURATION: 96 MS
EKG QTC CALCULATION (BAZETT): 468 MS
EKG R AXIS: 23 DEGREES
EKG T AXIS: 130 DEGREES
EKG VENTRICULAR RATE: 85 BPM
ESTIMATED AVERAGE GLUCOSE: 116.9 MG/DL
GFR SERPL CREATININE-BSD FRML MDRD: 21 ML/MIN/{1.73_M2}
GLUCOSE BLD-MCNC: 134 MG/DL (ref 70–99)
GLUCOSE BLD-MCNC: 143 MG/DL (ref 70–99)
GLUCOSE BLD-MCNC: 146 MG/DL (ref 70–99)
GLUCOSE BLD-MCNC: 149 MG/DL (ref 70–99)
GLUCOSE BLD-MCNC: 50 MG/DL (ref 70–99)
GLUCOSE BLD-MCNC: 74 MG/DL (ref 70–99)
GLUCOSE BLD-MCNC: 82 MG/DL (ref 70–99)
GLUCOSE BLD-MCNC: 88 MG/DL (ref 70–99)
HBA1C MFR BLD: 5.7 %
HCT VFR BLD CALC: 38.4 % (ref 36–48)
HEMOGLOBIN: 12.4 G/DL (ref 12–16)
INR BLD: 3.85 (ref 0.87–1.14)
MAGNESIUM: 2.5 MG/DL (ref 1.8–2.4)
MCH RBC QN AUTO: 29.7 PG (ref 26–34)
MCHC RBC AUTO-ENTMCNC: 32.2 G/DL (ref 31–36)
MCV RBC AUTO: 92.1 FL (ref 80–100)
PDW BLD-RTO: 16 % (ref 12.4–15.4)
PERFORMED ON: ABNORMAL
PERFORMED ON: NORMAL
PERFORMED ON: NORMAL
PHOSPHORUS: 3.6 MG/DL (ref 2.5–4.9)
PLATELET # BLD: 143 K/UL (ref 135–450)
PMV BLD AUTO: 8.5 FL (ref 5–10.5)
POTASSIUM SERPL-SCNC: 4.2 MMOL/L (ref 3.5–5.1)
PROTHROMBIN TIME: 38.2 SEC (ref 11.7–14.5)
RBC # BLD: 4.16 M/UL (ref 4–5.2)
SODIUM BLD-SCNC: 136 MMOL/L (ref 136–145)
TOTAL PROTEIN: 5.7 G/DL (ref 6.4–8.2)
WBC # BLD: 5.8 K/UL (ref 4–11)

## 2023-01-09 PROCEDURE — 6360000002 HC RX W HCPCS: Performed by: INTERNAL MEDICINE

## 2023-01-09 PROCEDURE — 36415 COLL VENOUS BLD VENIPUNCTURE: CPT

## 2023-01-09 PROCEDURE — 6370000000 HC RX 637 (ALT 250 FOR IP): Performed by: INTERNAL MEDICINE

## 2023-01-09 PROCEDURE — 93010 ELECTROCARDIOGRAM REPORT: CPT | Performed by: INTERNAL MEDICINE

## 2023-01-09 PROCEDURE — 80053 COMPREHEN METABOLIC PANEL: CPT

## 2023-01-09 PROCEDURE — 2500000003 HC RX 250 WO HCPCS: Performed by: INTERNAL MEDICINE

## 2023-01-09 PROCEDURE — 2580000003 HC RX 258: Performed by: INTERNAL MEDICINE

## 2023-01-09 PROCEDURE — 6360000002 HC RX W HCPCS: Performed by: NURSE PRACTITIONER

## 2023-01-09 PROCEDURE — 85027 COMPLETE CBC AUTOMATED: CPT

## 2023-01-09 PROCEDURE — 84100 ASSAY OF PHOSPHORUS: CPT

## 2023-01-09 PROCEDURE — 1200000000 HC SEMI PRIVATE

## 2023-01-09 PROCEDURE — 85610 PROTHROMBIN TIME: CPT

## 2023-01-09 PROCEDURE — 6370000000 HC RX 637 (ALT 250 FOR IP): Performed by: NURSE PRACTITIONER

## 2023-01-09 PROCEDURE — 83735 ASSAY OF MAGNESIUM: CPT

## 2023-01-09 RX ORDER — TORSEMIDE 20 MG/1
10 TABLET ORAL DAILY
Status: DISCONTINUED | OUTPATIENT
Start: 2023-01-09 | End: 2023-01-10 | Stop reason: HOSPADM

## 2023-01-09 RX ORDER — HYDROMORPHONE HYDROCHLORIDE 1 MG/ML
0.5 INJECTION, SOLUTION INTRAMUSCULAR; INTRAVENOUS; SUBCUTANEOUS
Status: DISCONTINUED | OUTPATIENT
Start: 2023-01-09 | End: 2023-01-10 | Stop reason: HOSPADM

## 2023-01-09 RX ORDER — OXYCODONE HYDROCHLORIDE 5 MG/1
5 TABLET ORAL ONCE
Status: COMPLETED | OUTPATIENT
Start: 2023-01-09 | End: 2023-01-09

## 2023-01-09 RX ORDER — MORPHINE SULFATE 4 MG/ML
4 INJECTION, SOLUTION INTRAMUSCULAR; INTRAVENOUS ONCE
Status: COMPLETED | OUTPATIENT
Start: 2023-01-09 | End: 2023-01-09

## 2023-01-09 RX ADMIN — NALOXEGOL OXALATE 25 MG: 25 TABLET, FILM COATED ORAL at 06:26

## 2023-01-09 RX ADMIN — PROMETHAZINE HYDROCHLORIDE 6.25 MG: 25 INJECTION INTRAMUSCULAR; INTRAVENOUS at 05:23

## 2023-01-09 RX ADMIN — MORPHINE SULFATE 4 MG: 4 INJECTION, SOLUTION INTRAMUSCULAR; INTRAVENOUS at 10:14

## 2023-01-09 RX ADMIN — DEXTROSE MONOHYDRATE: 100 INJECTION, SOLUTION INTRAVENOUS at 01:01

## 2023-01-09 RX ADMIN — PANTOPRAZOLE SODIUM 40 MG: 40 TABLET, DELAYED RELEASE ORAL at 06:26

## 2023-01-09 RX ADMIN — OXYCODONE 10 MG: 5 TABLET ORAL at 06:25

## 2023-01-09 RX ADMIN — OXYCODONE 5 MG: 5 TABLET ORAL at 07:58

## 2023-01-09 RX ADMIN — TORSEMIDE 10 MG: 20 TABLET ORAL at 15:13

## 2023-01-09 RX ADMIN — CALCITRIOL 0.25 MCG: 0.25 CAPSULE ORAL at 08:04

## 2023-01-09 RX ADMIN — ONDANSETRON 4 MG: 2 INJECTION INTRAMUSCULAR; INTRAVENOUS at 00:29

## 2023-01-09 RX ADMIN — MIDODRINE HYDROCHLORIDE 2.5 MG: 5 TABLET ORAL at 14:21

## 2023-01-09 RX ADMIN — HYDROMORPHONE HYDROCHLORIDE 0.5 MG: 1 INJECTION, SOLUTION INTRAMUSCULAR; INTRAVENOUS; SUBCUTANEOUS at 16:11

## 2023-01-09 RX ADMIN — OXYCODONE 10 MG: 5 TABLET ORAL at 02:02

## 2023-01-09 RX ADMIN — OXYCODONE 10 MG: 5 TABLET ORAL at 11:09

## 2023-01-09 RX ADMIN — ACETAMINOPHEN 1000 MG: 500 TABLET ORAL at 04:28

## 2023-01-09 RX ADMIN — HYDROMORPHONE HYDROCHLORIDE 0.5 MG: 1 INJECTION, SOLUTION INTRAMUSCULAR; INTRAVENOUS; SUBCUTANEOUS at 22:05

## 2023-01-09 RX ADMIN — HYDROMORPHONE HYDROCHLORIDE 0.5 MG: 1 INJECTION, SOLUTION INTRAMUSCULAR; INTRAVENOUS; SUBCUTANEOUS at 13:07

## 2023-01-09 RX ADMIN — Medication 10 ML: at 10:11

## 2023-01-09 RX ADMIN — LEVOTHYROXINE SODIUM 87.5 MCG: 0.07 TABLET ORAL at 06:26

## 2023-01-09 RX ADMIN — AMIODARONE HYDROCHLORIDE 100 MG: 200 TABLET ORAL at 08:03

## 2023-01-09 RX ADMIN — GLUCAGON HYDROCHLORIDE 1 MG: KIT at 00:40

## 2023-01-09 RX ADMIN — OXYCODONE 10 MG: 5 TABLET ORAL at 15:14

## 2023-01-09 RX ADMIN — ACETAMINOPHEN 1000 MG: 500 TABLET ORAL at 15:12

## 2023-01-09 RX ADMIN — MIDODRINE HYDROCHLORIDE 2.5 MG: 5 TABLET ORAL at 16:38

## 2023-01-09 RX ADMIN — Medication 10 ML: at 21:00

## 2023-01-09 RX ADMIN — ASPIRIN 81 MG: 81 TABLET, CHEWABLE ORAL at 08:04

## 2023-01-09 RX ADMIN — MIDODRINE HYDROCHLORIDE 2.5 MG: 5 TABLET ORAL at 08:03

## 2023-01-09 ASSESSMENT — PAIN DESCRIPTION - LOCATION
LOCATION: BACK

## 2023-01-09 ASSESSMENT — PAIN DESCRIPTION - DESCRIPTORS
DESCRIPTORS: DISCOMFORT
DESCRIPTORS: ACHING;SHOOTING;DISCOMFORT

## 2023-01-09 ASSESSMENT — PAIN SCALES - GENERAL
PAINLEVEL_OUTOF10: 7
PAINLEVEL_OUTOF10: 6
PAINLEVEL_OUTOF10: 10
PAINLEVEL_OUTOF10: 7
PAINLEVEL_OUTOF10: 10
PAINLEVEL_OUTOF10: 6
PAINLEVEL_OUTOF10: 10
PAINLEVEL_OUTOF10: 7
PAINLEVEL_OUTOF10: 8
PAINLEVEL_OUTOF10: 6
PAINLEVEL_OUTOF10: 10
PAINLEVEL_OUTOF10: 9
PAINLEVEL_OUTOF10: 10

## 2023-01-09 ASSESSMENT — PAIN DESCRIPTION - ORIENTATION: ORIENTATION: MID

## 2023-01-09 ASSESSMENT — PAIN SCALES - WONG BAKER
WONGBAKER_NUMERICALRESPONSE: 6
WONGBAKER_NUMERICALRESPONSE: 10
WONGBAKER_NUMERICALRESPONSE: 6
WONGBAKER_NUMERICALRESPONSE: 10

## 2023-01-09 ASSESSMENT — PAIN DESCRIPTION - FREQUENCY: FREQUENCY: CONTINUOUS

## 2023-01-09 ASSESSMENT — PAIN - FUNCTIONAL ASSESSMENT: PAIN_FUNCTIONAL_ASSESSMENT: PREVENTS OR INTERFERES WITH MANY ACTIVE NOT PASSIVE ACTIVITIES

## 2023-01-09 ASSESSMENT — PAIN DESCRIPTION - ONSET: ONSET: ON-GOING

## 2023-01-09 ASSESSMENT — PAIN DESCRIPTION - PAIN TYPE: TYPE: CHRONIC PAIN

## 2023-01-09 NOTE — RT PROTOCOL NOTE
RT Inhaler-Nebulizer Bronchodilator Protocol Note    There is a bronchodilator order in the chart from a provider indicating to follow the RT Bronchodilator Protocol and there is an Initiate RT Inhaler-Nebulizer Bronchodilator Protocol order as well (see protocol at bottom of note). CXR Findings:  No results found. The findings from the last RT Protocol Assessment were as follows:   History Pulmonary Disease: Chronic pulmonary disease  Respiratory Pattern: Regular pattern and RR 12-20 bpm  Breath Sounds: Clear breath sounds  Cough: Strong, spontaneous, non-productive  Indication for Bronchodilator Therapy:    Bronchodilator Assessment Score: 2    Aerosolized bronchodilator medication orders have been revised according to the RT Inhaler-Nebulizer Bronchodilator Protocol below. Respiratory Therapist to perform RT Therapy Protocol Assessment initially then follow the protocol. Repeat RT Therapy Protocol Assessment PRN for score 0-3 or on second treatment, BID, and PRN for scores above 3. No Indications - adjust the frequency to every 6 hours PRN wheezing or bronchospasm, if no treatments needed after 48 hours then discontinue using Per Protocol order mode. If indication present, adjust the RT bronchodilator orders based on the Bronchodilator Assessment Score as indicated below. Use Inhaler orders unless patient has one or more of the following: on home nebulizer, not able to hold breath for 10 seconds, is not alert and oriented, cannot activate and use MDI correctly, or respiratory rate 25 breaths per minute or more, then use the equivalent nebulizer order(s) with same Frequency and PRN reasons based on the score. If a patient is on this medication at home then do not decrease Frequency below that used at home.     0-3 - enter or revise RT bronchodilator order(s) to equivalent RT Bronchodilator order with Frequency of every 4 hours PRN for wheezing or increased work of breathing using Per Protocol order mode. 4-6 - enter or revise RT Bronchodilator order(s) to two equivalent RT bronchodilator orders with one order with BID Frequency and one order with Frequency of every 4 hours PRN wheezing or increased work of breathing using Per Protocol order mode. 7-10 - enter or revise RT Bronchodilator order(s) to two equivalent RT bronchodilator orders with one order with TID Frequency and one order with Frequency of every 4 hours PRN wheezing or increased work of breathing using Per Protocol order mode. 11-13 - enter or revise RT Bronchodilator order(s) to one equivalent RT bronchodilator order with QID Frequency and an Albuterol order with Frequency of every 4 hours PRN wheezing or increased work of breathing using Per Protocol order mode. Greater than 13 - enter or revise RT Bronchodilator order(s) to one equivalent RT bronchodilator order with every 4 hours Frequency and an Albuterol order with Frequency of every 2 hours PRN wheezing or increased work of breathing using Per Protocol order mode. RT to enter RT Home Evaluation for COPD & MDI Assessment order using Per Protocol order mode.     Electronically signed by Adelso Silva RCP on 1/9/2023 at 4:55 PM

## 2023-01-09 NOTE — PROGRESS NOTES
Physical/Occupational Therapy  [facility]  PT/OT orders noted and appreciated. Noted pt was seen and evaluated by therapy services on 1/4/23 and D/C as independent. Pt reporting resolution of initial complaints of RLE numbness and weakness. However, pt now with severe low back pain and therapy re-ordered. Upon arrival, pt sitting up hunched over with head in hands in recliner with family present rubbing her back. Therapy explaining purpose of new orders and pt/family declining at this time, pt citing \"13/10\" pain and unable to receive new pain medication as pt has already received multiple medications as ordered without relief. Pt's family reporting pt has been completing toileting/mobility, but then reports that she requires family assistance due to dizziness, which is a decline from initial therapy evaluations on 1/4. PT/OT educating family on attempting new therapy evaluations as able to assess potential need for assistance/therapy services. Family verbalized understanding.   Thank you,  Jarvis Gong, [address] [facility], DPT, [id number]  Deisy Boyd, OTR/L, [id number]

## 2023-01-09 NOTE — PLAN OF CARE
Problem: Pain  Goal: Verbalizes/displays adequate comfort level or baseline comfort level  1/9/2023 0912 by Renate Choudhury RN  Outcome: Progressing  Flowsheets (Taken 1/9/2023 0730)  Verbalizes/displays adequate comfort level or baseline comfort level: Encourage patient to monitor pain and request assistance  1/9/2023 0106 by Mabel Guevara RN  Outcome: Progressing     Problem: Safety - Adult  Goal: Free from fall injury  1/9/2023 0912 by Renate Choudhury RN  Outcome: Progressing  Flowsheets (Taken 1/9/2023 0911)  Free From Fall Injury: Instruct family/caregiver on patient safety  1/9/2023 0106 by Mabel Guevara RN  Outcome: Progressing     Problem: ABCDS Injury Assessment  Goal: Absence of physical injury  1/9/2023 0912 by Renate Choudhury RN  Outcome: Progressing  Flowsheets (Taken 1/9/2023 0911)  Absence of Physical Injury: Implement safety measures based on patient assessment  1/9/2023 0106 by Mabel Guevara RN  Outcome: Progressing     Problem: Chronic Conditions and Co-morbidities  Goal: Patient's chronic conditions and co-morbidity symptoms are monitored and maintained or improved  1/9/2023 0912 by Renate Choudhury RN  Outcome: Progressing  Flowsheets (Taken 1/9/2023 0747)  Care Plan - Patient's Chronic Conditions and Co-Morbidity Symptoms are Monitored and Maintained or Improved: Monitor and assess patient's chronic conditions and comorbid symptoms for stability, deterioration, or improvement  1/9/2023 0106 by Mabel Guevara RN  Outcome: Progressing

## 2023-01-09 NOTE — PROGRESS NOTES
Patient given pain medications to help with her extreme back pain and is asleep no win her chair. New order for MS every 3 hours PRN. Family aware.

## 2023-01-09 NOTE — PROGRESS NOTES
Hospitalist Progress Note      PCP: Gordon Pitts MD    Date of Admission: 1/3/2023    Chief Complaint: Generalized weakness    Hospital Course:  Vinicius Meade is a 69 y.o. F with h/o CKD, atrial fibrillation, CAD/CABG, mitral valve replacement bioprosthetic 2018, hyperlipidemia, DM 2 who presented with intermittent diarrhea for the last 2 weeks; multiple episodes, loose foul-smelling stools associated with nausea , vomiting and poor appetite. Patient denied any history of C. difficile but recently completed a course of antibiotics for cellulitis. CT in the ER showed small bowel pneumatosis and venous air in the mesentery which is unchanged from prior CT 5/2022. Serum creatinine was also noted to be 2.4 on admission. Subjective: Patient seen and examined. No further episodes of diarrhea since admission. No Nausea or vomiting. Severe lower back pain, no LE weakness or sensory impairment.         Medications:  Reviewed    Infusion Medications    sodium chloride      dextrose Stopped (01/09/23 0147)     Scheduled Medications    naloxegol  25 mg Oral QAM AC    levothyroxine  87.5 mcg Oral Daily    warfarin placeholder: dosing by pharmacy   Other RX Placeholder    acetaminophen  1,000 mg Oral 3 times per day    lidocaine  2 patch TransDERmal Daily    amiodarone  100 mg Oral Daily    aspirin  81 mg Oral Daily    calcitRIOL  0.25 mcg Oral Daily    insulin glargine  16 Units SubCUTAneous QAM    insulin lispro  0-8 Units SubCUTAneous TID WC    insulin lispro  0-4 Units SubCUTAneous Nightly    [Held by provider] metoprolol succinate  50 mg Oral Daily    midodrine  2.5 mg Oral TID WC    pantoprazole  40 mg Oral QAM AC    sodium chloride flush  5-40 mL IntraVENous 2 times per day     PRN Meds: HYDROmorphone, promethazine, oxyCODONE **OR** oxyCODONE, methocarbamol, perflutren lipid microspheres, albuterol, ipratropium-albuterol, sodium chloride flush, sodium chloride, ondansetron **OR** ondansetron, polyethylene glycol, dextrose bolus **OR** dextrose bolus, glucagon (rDNA), dextrose      Intake/Output Summary (Last 24 hours) at 1/9/2023 1128  Last data filed at 1/9/2023 0912  Gross per 24 hour   Intake 240 ml   Output 46167 ml   Net -97738 ml         Physical Exam Performed:    /79   Pulse (!) 111   Temp 97.5 °F (36.4 °C) (Oral)   Resp 20   Ht 5' 6\" (1.676 m)   Wt 193 lb 9.6 oz (87.8 kg)   SpO2 96%   BMI 31.25 kg/m²     General appearance: No apparent distress, appears stated age and cooperative. HEENT: Pupils equal, round, and reactive to light. Conjunctivae clear. Neck: Supple, with full range of motion. No jugular venous distention. Trachea midline. Respiratory:  Normal respiratory effort. Clear to auscultation, bilaterally without Rales/Wheezes/Rhonchi. Cardiovascular: Regular rate and rhythm with normal S1/S2 without murmurs, rubs or gallops. Abdomen: Soft, non-tender, non-distended with normal bowel sounds. Musculoskeletal: No clubbing, cyanosis or edema bilaterally. Full range of motion without deformity. Mid and lower back tenderness  Skin: Skin color, texture, turgor normal.  No rashes or lesions. Neurologic:  Neurovascularly intact without any focal sensory/motor deficits.  Cranial nerves: II-XII intact, grossly non-focal.  Psychiatric: Alert and oriented, thought content appropriate, normal insight  Capillary Refill: Brisk, 3 seconds, normal   Peripheral Pulses: +2 palpable, equal bilaterally       Labs:   Recent Labs     01/08/23  1005 01/09/23  0556   WBC 5.5 5.8   HGB 12.2 12.4   HCT 37.4 38.4    143     Recent Labs     01/07/23  0712 01/08/23  1004 01/09/23  0556   * 133* 136   K 4.0 4.3 4.2    101 102   CO2 24 20* 20*   BUN 66* 68* 60*   CREATININE 2.6* 2.5* 2.3*   CALCIUM 8.0* 8.3 8.6   PHOS 3.0 4.0 3.6     Recent Labs     01/07/23  0712 01/09/23  0556   AST 21 25   ALT 18 22   BILITOT 0.6 0.9   ALKPHOS 106 125     Recent Labs     01/07/23  9897 01/08/23  1005 01/09/23  0556   INR 3.95* 4.03* 3.85*     No results for input(s): CKTOTAL, TROPONINI in the last 72 hours. Urinalysis:      Lab Results   Component Value Date/Time    NITRU Negative 01/03/2023 10:00 PM    WBCUA 4 01/03/2023 10:00 PM    BACTERIA None Seen 01/03/2023 10:00 PM    RBCUA 0 01/03/2023 10:00 PM    BLOODU Negative 01/03/2023 10:00 PM    SPECGRAV 1.010 01/03/2023 10:00 PM    GLUCOSEU Negative 01/03/2023 10:00 PM       Radiology:  XR ABDOMEN (KUB) (SINGLE AP VIEW)   Final Result   Findings suggest a diffuse ileus         XR ABDOMEN (KUB) (SINGLE AP VIEW)   Final Result   No radiographic evidence of bowel obstruction or constipation. CT THORACIC SPINE WO CONTRAST   Final Result   1. No acute thoracic spine fracture. 2. Interval vertebroplasty of the T5 compression fracture. The compression   fracture appears stable. Compression fractures from T8 through T11 appear   unchanged. 3. Moderate to advanced multilevel degenerative disc disease. Left posterior   disc bulging or protrusion at the T11-T12 level results in at least moderate   central canal narrowing. 4. Prominent central pulmonary arterial enlargement suggesting pulmonary   hypertension. CT LUMBAR SPINE WO CONTRAST   Final Result   Degenerative changes without acute abnormality. CT HEAD WO CONTRAST   Final Result   1. No acute intracranial findings. 2. Chronic microvascular ischemic changes and prior infarcts involving the   left frontal, right parietal and right occipital lobes which appears similar   to the prior study. 3. Mucosal thickening in the right maxillary sinus. 4. Stable 1.2 cm right frontal meningioma. CT ABDOMEN PELVIS WO CONTRAST Additional Contrast? None   Final Result   1. 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 2. No acute findings elsewhere in the abdomen or pelvis.    3. Decreased size of a left ovarian cyst now measuring up to 7.6 cm versus   8.5 cm previously. Pelvic ultrasound could be performed for further   evaluation. XR CHEST PORTABLE   Final Result   Clear lungs. IP CONSULT TO HOSPITALIST  IP CONSULT TO GI  IP CONSULT TO PHARMACY  IP CONSULT TO NEPHROLOGY  IP CONSULT TO NEUROSURGERY  IP CONSULT TO CARDIOLOGY  IP CONSULT TO PAIN MANAGEMENT    Assessment/Plan:    Active Hospital Problems    Diagnosis     [facility] ([facility] [I42.9]      Priority: Medium    Chronic midline thoracic back pain [M54.6, G89.29]      Priority: Medium    Chronic Compression fracture of thoracic vertebra ([facility] [S22.000A]      Priority: Medium    Diarrhea [R19.7]      Priority: Medium    Hyponatremia [E87.1]      Priority: Medium    Lumbar stenosis [M48.061]      Priority: Medium    Right leg weakness [R29.898]      Priority: Medium    Acute kidney injury superimposed on CKD ([facility] [N17.9, N18.9]     Degenerative disc disease, thoracic [M51.34]     S/P MVR (mitral valve replacement) [Z95.2]     S/P CABG x 3 [Z95.1]     Atrial fibrillation ([facility] [I48.91]      Worsening Acute on chronic back pain:  Status post recent Kyphosplasty T5 on 12/8/2022. Right foot numbness and weakness with dorsiflexsion resolved. Repeat CT T and L-spine showed multiple chronic thoracic fractures and L3-4 stenosis. Neurosurgery consult appreciated; No surgical intervention indicated. Pain management consulted. TORIBIO on CKD 3:   Baseline Scr high 1s to low 2s. Scr on admission 2.4. Trended down to 2.0 with IV hydration. Torsemide resumed with worsening serum creatinine. 2.3 today. Nephrology consulted: Appreciate input. Aldactone on hold. Further recs per nephrology. Chronically elevated troponin:  At baseline; 0.04-0.06. Asymptomatic. EKG: A. fib with nonspecific ST-T changes. Echo 5/6/2022: LVEF 40%. 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 Well-seated bioprosthetic mitral valve.   Echo 1/6/2023: LVEF 30% with diffuse hypokinesis. Cardiology consulted; recommended out patient follow up. Hypothyroidism:  TSH suppressed, 0.10 with elevated free T4 2.9. Synthroid dose decreased from 100 to 87.5 mcg. Repeat TSH in 4 to 6 weeks. Diarrhea with nausea and vomiting: Resolved  GI consult appreciated: Follow-up stool work-up if diarrhea recurs. Started on Xifaxan 550 mg 3 times daily for 14 days for possible SIBO. Outpatient follow-up with GI. Ileus: Patient without abdominal pain, N/V. Having formed BM. Tolerating g clears. Started on Full liquid diet. Chronic systolic CHF: No acute exacerbation. Diuretics management per nephrology. Bioprosthetic mitral valve replacement: Stable on echo 5/6/2022. Chronic A. fib: Rate controlled on metoprolol and amiodarone. INR supra therapeutic. Continue warfarin per pharmacy dosing. Chronic Hypotension: Continue midodrine. T2DM on long-term insulin: Follow-up A1c. Monitor BG on Lantus/SSI. DVT Prophylaxis: Coumadin  Diet: ADULT DIET;  Clear Liquid  Code Status: Full Code  PT/OT Eval Status: Independent    Dispo -Home once medically stable      Hailey Simons MD

## 2023-01-09 NOTE — PROGRESS NOTES
Shift assessment completed. Routine vitals stable. Scheduled medications given. Patient is awake, alert and oriented. Patient sitting up in the chair with her grand daughter rubbing her back. Patient requesting more pain medication for extreme back pain. Message sent to Dr Wally Park with order for Oxy 5mg once now. Patient already received pain medication at 625AM.  Respirations are easy and unlabored. Call light within reach. [address] daughter assisting patient to the rest room.

## 2023-01-09 NOTE — CONSULTS
Thank you for the consult   The patient is a previous patient of mine, she was last seen in Nov and fist seen in Oct.   The patient was taking THC from her daughter, and she elected not to come back to the office after she was told about the results.   She is angry that she was not given opioids despite that fact she was not upfront in telling us about the [facility], after alerting the patient that she can not take both, we proceeded by giving her the pain medications, but she continues to be angry that there was a delay, the office was following the law and the agreement that the patient signed     I offered the patient to come back but she declined, stating that she wanted to go to another pain physician   I recommend Dr Alex Stephens in [place]     Thank you for including me in the patient care     Brooke Truong MD       [email or web address]

## 2023-01-09 NOTE — PROGRESS NOTES
01/09/23 1654   RT Protocol   History Pulmonary Disease 2   Respiratory pattern 0   Breath sounds 0   Cough 0   Bronchodilator Assessment Score 2

## 2023-01-09 NOTE — PROGRESS NOTES
[facility]   Nephrology progress Note  [phone]   [facility]. [employer]           SUMMARY :  We are following this patient for TORIBIO on CKD    78-year-old female with past medical history of CKD, A. fib, coronary artery disease s/p CABG, mitral valve replacement with bioprosthetic valve in 2018, T2DM, hyperlipidemia, presented to hospital with 2 to 3 weeks history of diarrhea accompanied by nausea vomiting and generalized weakness. She has had poor p.o. intake. For her right lower extremity cellulitis she had been treated with antibiotics. CT abdominal pelvis without IV contrast showed small bowel pneumatosis and air in the mesentery. Follows with me     SUBJECTIVE:   Patient progress reviewed. The patient is having lower back pain.  by bedside   1/6/23: swelling ankles ( was on Torsemide 80 mg BID)  1/7/23: less swelling ankles, cardiology consult - p  1/8/23: Nausea and vomiting x2 since last night  1/9/23: has severe lower back pain , some swelling of dependent feet     Physical Exam:    VITALS:  /79   Pulse (!) 111   Temp 97.5 °F (36.4 °C) (Oral)   Resp 20   Ht 5' 6\" (1.676 m)   Wt 193 lb 9.6 oz (87.8 kg)   SpO2 96%   BMI 31.25 kg/m²   BLOOD PRESSURE RANGE:  Systolic (83SHP), OCH:677 , Min:98 , HILARY:344   ; Diastolic (82KIX), ICZ:69, Min:67, Max:94    24HR INTAKE/OUTPUT:    Intake/Output Summary (Last 24 hours) at 1/9/2023 1114  Last data filed at 1/9/2023 0912  Gross per 24 hour   Intake 240 ml   Output 74455 ml   Net -86982 ml     I/O ? Inaccurate     Gen:  alert, oriented x 3, in pain   PERRL , EOM +  Pallor +, No icterus  JVP not raised   CV: RRR no murmur or rub . Paced, Defibrillator- pacemaker +  Mid sternal scar of CABG   Systolic murmur at base of heart   Lungs:B/ L air entry, Normal breath sounds   Abd: soft, bowel sounds + , No organomegaly   Ext: Trace edema , no cyanosis  Skin: Warm.   No rash  Neuro: nonfocal.    DATA:    CBC with Differential: Lab Results   Component Value Date/Time    WBC 5.8 01/09/2023 05:56 AM    RBC 4.16 01/09/2023 05:56 AM    HGB 12.4 01/09/2023 05:56 AM    HCT 38.4 01/09/2023 05:56 AM     01/09/2023 05:56 AM    MCV 92.1 01/09/2023 05:56 AM    MCH 29.7 01/09/2023 05:56 AM    MCHC 32.2 01/09/2023 05:56 AM    RDW 16.0 01/09/2023 05:56 AM    SEGSPCT 64.1 09/02/2020 11:37 AM    BANDSPCT 1 01/14/2019 07:26 PM    LYMPHOPCT 9.5 01/06/2023 07:14 AM    MONOPCT 10.0 01/06/2023 07:14 AM    BASOPCT 0.3 01/06/2023 07:14 AM    MONOSABS 0.9 01/06/2023 07:14 AM    LYMPHSABS 0.9 01/06/2023 07:14 AM    EOSABS 0.1 01/06/2023 07:14 AM    BASOSABS 0.0 01/06/2023 07:14 AM     CMP:    Lab Results   Component Value Date/Time     01/09/2023 05:56 AM    K 4.2 01/09/2023 05:56 AM    K 3.5 01/04/2023 06:00 AM     01/09/2023 05:56 AM    CO2 20 01/09/2023 05:56 AM    BUN 60 01/09/2023 05:56 AM    CREATININE 2.3 01/09/2023 05:56 AM    GFRAA 31 09/06/2022 08:49 AM    AGRATIO 1.1 01/09/2023 05:56 AM    LABGLOM 21 01/09/2023 05:56 AM    GLUCOSE 88 01/09/2023 05:56 AM    PROT 5.7 01/09/2023 05:56 AM    LABALBU 3.0 01/09/2023 05:56 AM    CALCIUM 8.6 01/09/2023 05:56 AM    BILITOT 0.9 01/09/2023 05:56 AM    ALKPHOS 125 01/09/2023 05:56 AM    AST 25 01/09/2023 05:56 AM    ALT 22 01/09/2023 05:56 AM     Magnesium:    Lab Results   Component Value Date/Time    MG 2.50 01/09/2023 05:56 AM     Phosphorus:    Lab Results   Component Value Date/Time    PHOS 3.6 01/09/2023 05:56 AM     Troponin:    Lab Results   Component Value Date/Time    TROPONINI 0.04 01/03/2023 01:57 PM     U/A:    Lab Results   Component Value Date/Time    NITRITE neg 10/17/2022 09:15 AM    COLORU Yellow 01/03/2023 10:00 PM    PROTEINU Negative 01/03/2023 10:00 PM    PHUR 5.5 01/03/2023 10:00 PM    WBCUA 4 01/03/2023 10:00 PM    RBCUA 0 01/03/2023 10:00 PM    YEAST neg 02/12/2021 09:45 AM    BACTERIA None Seen 01/03/2023 10:00 PM    CLARITYU CLOUDY 01/03/2023 10:00 PM    [address] 1.010 01/03/2023 10:00 PM    LEUKOCYTESUR SMALL 01/03/2023 10:00 PM    UROBILINOGEN 1.0 01/03/2023 10:00 PM    BILIRUBINUR Negative 01/03/2023 10:00 PM    BILIRUBINUR neg 10/17/2022 09:15 AM    BLOODU Negative 01/03/2023 10:00 PM    GLUCOSEU Negative 01/03/2023 10:00 PM         IMPRESSION/RECOMMENDATIONS:      Diagnosis and Plan :     CKD stage 4  Likely diabetic nephropathy  Baseline Cr 1.9, eGFR 29    TORIBIO with drop in urine output  Likely Pre Renal   Cr 2.3  Restart Torsemide at lower dose     sCHF     T2DM   Since 1990    HTN: since 2010   BP is normal         Russel Perdue MD

## 2023-01-09 NOTE — PROGRESS NOTES
Pharmacy to Dose Warfarin    Pharmacy consulted to dose warfarin for Afib. INR Goal: 2-3    INR today: 3.85    Assessment/Plan:  - INR supratherapeutic, trending down   - continue to hold warfarin today  - Possible concomitant drug-drug interactions include: amiodarone, levothyroxine     Pharmacy will continue to follow. Gurinder Smith, PharmD.   PGY-1 Resident  [id number]  01/09/23 8:00 AM

## 2023-01-09 NOTE — PROGRESS NOTES
BS- 50. PRN subcutaneous Glucagon given x1. Recheck BS-74.  Start D10% infusion per [facility] NP.

## 2023-01-09 NOTE — PLAN OF CARE
Problem: Pain  Goal: Verbalizes/displays adequate comfort level or baseline comfort level  1/9/2023 0106 by Christina Ramey RN  Outcome: Progressing  1/8/2023 1303 by Nisha Sunshine RN  Outcome: Progressing  Flowsheets (Taken 1/8/2023 0915)  Verbalizes/displays adequate comfort level or baseline comfort level:   Encourage patient to monitor pain and request assistance   Assess pain using appropriate pain scale   Administer analgesics based on type and severity of pain and evaluate response     Problem: Safety - Adult  Goal: Free from fall injury  1/9/2023 0106 by Christina Ramey RN  Outcome: Progressing  1/8/2023 1303 by Nisha Sunshine RN  Outcome: Progressing     Problem: ABCDS Injury Assessment  Goal: Absence of physical injury  1/9/2023 0106 by Christina Ramey RN  Outcome: Progressing  1/8/2023 1303 by Nisha Sunshine RN  Outcome: Progressing     Problem: Chronic Conditions and Co-morbidities  Goal: Patient's chronic conditions and co-morbidity symptoms are monitored and maintained or improved  1/9/2023 0106 by Christina Ramey RN  Outcome: Progressing  1/8/2023 1303 by Nisha Sunshine RN  Outcome: Progressing  Flowsheets (Taken 1/8/2023 0915)  Care Plan - Patient's Chronic Conditions and Co-Morbidity Symptoms are Monitored and Maintained or Improved:   Monitor and assess patient's chronic conditions and comorbid symptoms for stability, deterioration, or improvement   Collaborate with multidisciplinary team to address chronic and comorbid conditions and prevent exacerbation or deterioration

## 2023-01-10 VITALS
DIASTOLIC BLOOD PRESSURE: 74 MMHG | SYSTOLIC BLOOD PRESSURE: 112 MMHG | HEIGHT: 66 IN | OXYGEN SATURATION: 94 % | HEART RATE: 109 BPM | TEMPERATURE: 98.1 F | RESPIRATION RATE: 17 BRPM | BODY MASS INDEX: 31.05 KG/M2 | WEIGHT: 193.2 LBS

## 2023-01-10 PROBLEM — K56.7 ILEUS (HCC): Status: ACTIVE | Noted: 2023-01-10

## 2023-01-10 LAB
A/G RATIO: 0.8 (ref 1.1–2.2)
ALBUMIN SERPL-MCNC: 2.5 G/DL (ref 3.4–5)
ALP BLD-CCNC: 116 U/L (ref 40–129)
ALT SERPL-CCNC: 22 U/L (ref 10–40)
ANION GAP SERPL CALCULATED.3IONS-SCNC: 9 MMOL/L (ref 3–16)
AST SERPL-CCNC: 20 U/L (ref 15–37)
BILIRUB SERPL-MCNC: 0.8 MG/DL (ref 0–1)
BUN BLDV-MCNC: 51 MG/DL (ref 7–20)
CALCIUM SERPL-MCNC: 8.6 MG/DL (ref 8.3–10.6)
CHLORIDE BLD-SCNC: 103 MMOL/L (ref 99–110)
CO2: 24 MMOL/L (ref 21–32)
CREAT SERPL-MCNC: 2.1 MG/DL (ref 0.6–1.2)
GFR SERPL CREATININE-BSD FRML MDRD: 24 ML/MIN/{1.73_M2}
GLUCOSE BLD-MCNC: 112 MG/DL (ref 70–99)
GLUCOSE BLD-MCNC: 161 MG/DL (ref 70–99)
GLUCOSE BLD-MCNC: 78 MG/DL (ref 70–99)
HCT VFR BLD CALC: 35.3 % (ref 36–48)
HEMOGLOBIN: 11.5 G/DL (ref 12–16)
INR BLD: 3.53 (ref 0.87–1.14)
MAGNESIUM: 2.2 MG/DL (ref 1.8–2.4)
MCH RBC QN AUTO: 30.2 PG (ref 26–34)
MCHC RBC AUTO-ENTMCNC: 32.7 G/DL (ref 31–36)
MCV RBC AUTO: 92.2 FL (ref 80–100)
PDW BLD-RTO: 15.5 % (ref 12.4–15.4)
PERFORMED ON: ABNORMAL
PERFORMED ON: ABNORMAL
PHOSPHORUS: 3.2 MG/DL (ref 2.5–4.9)
PLATELET # BLD: 150 K/UL (ref 135–450)
PMV BLD AUTO: 8.6 FL (ref 5–10.5)
POTASSIUM SERPL-SCNC: 3.7 MMOL/L (ref 3.5–5.1)
PROTHROMBIN TIME: 35.6 SEC (ref 11.7–14.5)
RBC # BLD: 3.82 M/UL (ref 4–5.2)
SODIUM BLD-SCNC: 136 MMOL/L (ref 136–145)
TOTAL PROTEIN: 5.8 G/DL (ref 6.4–8.2)
WBC # BLD: 5.3 K/UL (ref 4–11)

## 2023-01-10 PROCEDURE — 97530 THERAPEUTIC ACTIVITIES: CPT

## 2023-01-10 PROCEDURE — 80053 COMPREHEN METABOLIC PANEL: CPT

## 2023-01-10 PROCEDURE — 6370000000 HC RX 637 (ALT 250 FOR IP): Performed by: INTERNAL MEDICINE

## 2023-01-10 PROCEDURE — 36415 COLL VENOUS BLD VENIPUNCTURE: CPT

## 2023-01-10 PROCEDURE — 6360000002 HC RX W HCPCS: Performed by: INTERNAL MEDICINE

## 2023-01-10 PROCEDURE — 84100 ASSAY OF PHOSPHORUS: CPT

## 2023-01-10 PROCEDURE — 97161 PT EVAL LOW COMPLEX 20 MIN: CPT

## 2023-01-10 PROCEDURE — 2580000003 HC RX 258: Performed by: INTERNAL MEDICINE

## 2023-01-10 PROCEDURE — 97165 OT EVAL LOW COMPLEX 30 MIN: CPT

## 2023-01-10 PROCEDURE — 83735 ASSAY OF MAGNESIUM: CPT

## 2023-01-10 PROCEDURE — 97116 GAIT TRAINING THERAPY: CPT

## 2023-01-10 PROCEDURE — 85027 COMPLETE CBC AUTOMATED: CPT

## 2023-01-10 PROCEDURE — 85610 PROTHROMBIN TIME: CPT

## 2023-01-10 PROCEDURE — 6370000000 HC RX 637 (ALT 250 FOR IP): Performed by: NURSE PRACTITIONER

## 2023-01-10 RX ORDER — LEVOTHYROXINE SODIUM 175 UG/1
87.5 TABLET ORAL DAILY
Qty: 30 TABLET | Refills: 3 | Status: SHIPPED | OUTPATIENT
Start: 2023-01-11

## 2023-01-10 RX ORDER — LIDOCAINE 4 G/G
2 PATCH TOPICAL DAILY
Qty: 60 EACH | Refills: 0 | Status: SHIPPED | OUTPATIENT
Start: 2023-01-10 | End: 2023-01-13

## 2023-01-10 RX ORDER — TORSEMIDE 10 MG/1
10 TABLET ORAL DAILY
Qty: 30 TABLET | Refills: 3 | Status: SHIPPED | OUTPATIENT
Start: 2023-01-11 | End: 2023-01-13 | Stop reason: DRUGHIGH

## 2023-01-10 RX ORDER — OXYCODONE HYDROCHLORIDE 10 MG/1
10 TABLET ORAL 3 TIMES DAILY PRN
Qty: 20 TABLET | Refills: 0 | Status: SHIPPED | OUTPATIENT
Start: 2023-01-10 | End: 2023-01-30

## 2023-01-10 RX ORDER — METHOCARBAMOL 750 MG/1
750 TABLET, FILM COATED ORAL 4 TIMES DAILY PRN
Qty: 30 TABLET | Refills: 0 | Status: SHIPPED | OUTPATIENT
Start: 2023-01-10 | End: 2023-02-09

## 2023-01-10 RX ADMIN — MIDODRINE HYDROCHLORIDE 2.5 MG: 5 TABLET ORAL at 08:37

## 2023-01-10 RX ADMIN — ACETAMINOPHEN 1000 MG: 500 TABLET ORAL at 14:34

## 2023-01-10 RX ADMIN — ASPIRIN 81 MG: 81 TABLET, CHEWABLE ORAL at 08:37

## 2023-01-10 RX ADMIN — ACETAMINOPHEN 1000 MG: 500 TABLET ORAL at 05:23

## 2023-01-10 RX ADMIN — PANTOPRAZOLE SODIUM 40 MG: 40 TABLET, DELAYED RELEASE ORAL at 05:24

## 2023-01-10 RX ADMIN — LEVOTHYROXINE SODIUM 87.5 MCG: 0.07 TABLET ORAL at 05:23

## 2023-01-10 RX ADMIN — TORSEMIDE 10 MG: 20 TABLET ORAL at 08:37

## 2023-01-10 RX ADMIN — AMIODARONE HYDROCHLORIDE 100 MG: 200 TABLET ORAL at 08:37

## 2023-01-10 RX ADMIN — OXYCODONE 10 MG: 5 TABLET ORAL at 14:34

## 2023-01-10 RX ADMIN — CALCITRIOL 0.25 MCG: 0.25 CAPSULE ORAL at 08:37

## 2023-01-10 RX ADMIN — INSULIN GLARGINE 16 UNITS: 100 INJECTION, SOLUTION SUBCUTANEOUS at 08:36

## 2023-01-10 RX ADMIN — OXYCODONE 10 MG: 5 TABLET ORAL at 10:11

## 2023-01-10 RX ADMIN — MIDODRINE HYDROCHLORIDE 2.5 MG: 5 TABLET ORAL at 12:05

## 2023-01-10 RX ADMIN — Medication 10 ML: at 08:39

## 2023-01-10 RX ADMIN — HYDROMORPHONE HYDROCHLORIDE 0.5 MG: 1 INJECTION, SOLUTION INTRAMUSCULAR; INTRAVENOUS; SUBCUTANEOUS at 12:07

## 2023-01-10 RX ADMIN — HYDROMORPHONE HYDROCHLORIDE 0.5 MG: 1 INJECTION, SOLUTION INTRAMUSCULAR; INTRAVENOUS; SUBCUTANEOUS at 03:53

## 2023-01-10 RX ADMIN — ONDANSETRON 4 MG: 2 INJECTION INTRAMUSCULAR; INTRAVENOUS at 03:53

## 2023-01-10 RX ADMIN — NALOXEGOL OXALATE 25 MG: 25 TABLET, FILM COATED ORAL at 05:28

## 2023-01-10 ASSESSMENT — PAIN SCALES - GENERAL
PAINLEVEL_OUTOF10: 10
PAINLEVEL_OUTOF10: 9
PAINLEVEL_OUTOF10: 6
PAINLEVEL_OUTOF10: 9
PAINLEVEL_OUTOF10: 6
PAINLEVEL_OUTOF10: 7

## 2023-01-10 ASSESSMENT — PAIN DESCRIPTION - DESCRIPTORS
DESCRIPTORS: DISCOMFORT
DESCRIPTORS: DISCOMFORT

## 2023-01-10 ASSESSMENT — PAIN DESCRIPTION - LOCATION
LOCATION: BACK

## 2023-01-10 ASSESSMENT — PAIN SCALES - WONG BAKER
WONGBAKER_NUMERICALRESPONSE: 2

## 2023-01-10 NOTE — DISCHARGE INSTR - COC
Continuity of Care Form    Patient Name: Matthew Andino   :  1945  MRN:  [id number]    Admit date:  1/3/2023  Discharge date:  ***    Code Status Order: Full Code   Advance Directives:     Admitting Physician:  Irina Donato MD  PCP: Coleen Bailey MD    Discharging Nurse: [facility] Unit/Room#: 5XV-9736/1366-79  Discharging Unit Phone Number: ***    Emergency Contact:   Extended Emergency Contact Information  Primary Emergency Contact: Murphy August  Address: [address] [place] [address] Phone: [phone]  Mobile Phone: [phone]  Relation: Spouse  Secondary Emergency Contact: Gricelda Ellison [address] Phone: [phone]  Mobile Phone: [phone]  Relation: Child    Past Surgical History:  Past Surgical History:   Procedure Laterality Date    BRONCHOSCOPY  2016    Dr. Karla Velazquez - brushings from [address] [place] [address]  2018    Dr. Kamran Verdin and 5/3 2021    Cardiac cath, cardioversion and rafat     SECTION      CHOLECYSTECTOMY      COLONOSCOPY  2017    Dr. Nazanin Nunez - sigmoid diverticulosis, polypectomies x3    COLONOSCOPY  2014    Dr. Nazanin Nunez - sigmoid diverticulosis, polypectomies x3, internal hemorrhoids    COLONOSCOPY  10/10/2018    w/biopsy performed by La Colon MD at [facility] N/A 2020    COLONOSCOPY DIAGNOSTIC performed by Karla Velazquez MD at [address] [place] [address]  2018    Dr. Kamryn Delgado - x3 (LIMA-LAD, L SV-D1-PLV) modified BL MAZE procedure w/obliteration of WAYNE using 45mm AtriClip    INSERTABLE CARDIAC MONITOR Left 2018    Dr. Alejandro Haq # [id number] [employer]    IR KYPHOPLASTY THORACIC FIRST LEVEL  2022    IR KYPHOPLASTY THORACIC FIRST LEVEL 2022 [facility] SPECIAL PROCEDURES    MITRAL VALVE REPLACEMENT  2018    Dr. Kamryn Delgado - Shola Curran tissue valve PAIN MANAGEMENT PROCEDURE N/A 12/18/2020    C6-C7 MIDLINE  EPIDURAL STEROID INJECTION WITH FLUOROSCOPY performed by Sonam Buenrostro MD at [address] Bilateral 1/18/2021    BILATERAL T11 TRANSFORAMINAL EPIDURAL STEROID INJECTION WITH FLUOROSCOPY performed by Sonam Buenrostro MD at [facility]      TRANSESOPHAGEAL ECHOCARDIOGRAM  08/21/2018    during CABG/MVR    TUNNELED VENOUS CATHETER PLACEMENT Left 08/23/2018    Dr. Kate Mi - IJ for HD---since removed    UPPER GASTROINTESTINAL ENDOSCOPY N/A 10/10/2018    w/biopsy performed by Candelario Suresh MD at [address] [facility] ENDOSCOPY       Immunization History:   Immunization History   Administered Date(s) Administered    COVID-19, MODERNA BLUE border, Primary or Immunocompromised, (age 12y+), IM, 100 mcg/0.5mL 01/31/2021, 03/15/2021, 03/15/2022    COVID-19, MODERNA Bivalent BOOSTER, (age 12y+), IM, 48 mcg/0.5 mL 10/27/2022    Influenza Virus Vaccine 09/26/2012, 12/15/2014, 12/16/2015, 12/27/2016    Influenza Whole 09/26/2012    Influenza, AFLURIA (age 1 yrs+), FLUZONE, (age 10 mo+), MDV, 0.5mL 12/27/2016    Influenza, AFLURIA, Arvis Russell, (age 10-32 m), PF 12/18/2018, 09/23/2020, 11/04/2021    Influenza, FLUCELVAX, (age 10 mo+), MDCK, PF, 0.5mL 11/09/2022    Pneumococcal Conjugate 13-valent ([id number]) 04/15/2015    Pneumococcal Polysaccharide (Evmsmelnn28) 04/28/2016       Active Problems:  Patient Active Problem List   Diagnosis Code    Anticoagulated on Coumadin Z79.01    Hypercholesteremia E78.00    Generalized osteoarthrosis, involving multiple sites Z79.4    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    Paroxysmal SVT (supraventricular tachycardia) (HCC) I47.1    B12 deficiency E53.8    History of pulmonary embolism Z86.711    Multiple pulmonary nodules R91.8    Asthma J45.909    Atrial fibrillation (HCC) I48.91    Hypertension I10    Coronary artery disease due to calcified coronary lesion I25.10, I25.84    Nonrheumatic mitral valve regurgitation I34.0    S/P MVR (mitral valve replacement) Z95.2    S/P CABG x 3 Z95.1    Goiter E04.9    Primary osteoarthritis of both knees M17.0    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    Obesity, Class I, BMI 30-34.9 E66.9    Chronic systolic CHF (congestive heart failure), NYHA class 3 ([facility]) I50.22    Degenerative disc disease, thoracic M51.34    Persistent atrial fibrillation (HCC) I48.19    S/P MVR (mitral valve repair) Z98.890    Ischemic cardiomyopathy I25.5    Occlusion and stenosis of bilateral carotid arteries I65.23    Pneumatosis intestinalis K63.89    Aortic valve stenosis I35.0    Deep vein thrombosis (DVT) of non-extremity vein I82.90    Acute on chronic systolic heart failure due to valvular disease ([facility]) I50.23, I38    Stage 4 chronic kidney disease ([facility]) N18.4    Acute kidney injury superimposed on CKD ([facility]) N17.9, N18.9    Chronic diastolic heart failure ([facility]) I50.32    Atherosclerosis of superior mesenteric artery ([facility]) K55.1    ICD (implantable cardioverter-defibrillator) in place Z95.810    Type 2 diabetes mellitus with stage 3b chronic kidney disease, with long-term current use of insulin ([facility]) E11.22, N18.32, Z79.4    Acquired hypothyroidism E03.9    Hypercoagulable state, secondary ([facility]) D68.69    Chronic midline thoracic back pain M54.6, G89.29    Chronic Compression fracture of thoracic vertebra ([facility]) S22.000A    Diarrhea R19.7    Hyponatremia E87.1    Lumbar stenosis M48.061    Right leg weakness R29.898    Cardiomyopathy ([facility]) I42.9    Ileus ([facility]) K56.7       Isolation/Infection:   Isolation            No Isolation          Patient Infection Status       Infection Onset Added Last Indicated Last Indicated By Review Planned Expiration Resolved Resolved By    None active    Resolved    C-diff Rule Out 23 GI Bacterial Pathogens By PCR (Ordered)   23 Isma Simms RN    Order     COVID-19 (Rule Out) 22 COVID-19 & Influenza Combo (Ordered)   22 Rule-Out Test Resulted COVID-19 11/10/21 11/11/21 11/10/21 COVID-19   21     C-diff Rule Out 20 Clostridium difficile toxin/antigen (Ordered)   20 Long Burger RN            Nurse Assessment:  Last Vital Signs: /68   Pulse (!) 113   Temp 98 °F (36.7 °C) (Oral)   Resp 18   Ht 5' 6\" (1.676 m)   Wt 193 lb 3.2 oz (87.6 kg)   SpO2 98%   BMI 31.18 kg/m²     Last documented pain score (0-10 scale): Pain Level: 10  Last Weight:   Wt Readings from Last 1 Encounters:   01/10/23 193 lb 3.2 oz (87.6 kg)     Mental Status:  {IP PT MENTAL STATUS:70901}    IV Access:  { MAREN IV ACCESS:295486899}    Nursing Mobility/ADLs:  Walking   {P DME NKHP:912977606}  Transfer  {P DME EDLN:118594281}  Bathing  {CHP DME ERQY:127773458}  Dressing  {CHP DME SZV}  Toileting  {CHP DME GLSZ:604640385}  Feeding  {P DME KPPO:034005373}  Med Admin  {P DME EHXC:204661642}  Med Delivery   { MAREN MED Delivery:293268827}    Wound Care Documentation and Therapy:        Elimination:  Continence: Bowel: {YES / IN:79661}  Bladder: {YES / BQ:91294}  Urinary Catheter: {Urinary Catheter:789127660}   Colostomy/Ileostomy/Ileal Conduit: {YES / TW:48943}       Date of Last BM: ***  No intake or output data in the 24 hours ending 01/10/23 1058  I/O last 3 completed shifts:   In: 120 [P.O.:120]  Out: 50751 [Urine:89170]    Safety Concerns:     [facility] MAREN Safety Concerns:057540116}    Impairments/Disabilities:      [facility] [employer] Impairments/Disabilities:297673584}    Nutrition Therapy:  Current Nutrition Therapy:   [facility] MAREN Diet List:805576683}    Routes of Feeding: {CHP DME Other Feedings:093338894}  Liquids: {Slp liquid thickness:51497}  Daily Fluid Restriction: {CHP DME Yes amt example:865399889}  Last Modified Barium Swallow with Video (Video Swallowing Test): {Done Not Done TQDT:471341067}    Treatments at the Time of Hospital Discharge:   Respiratory Treatments: ***  Oxygen Therapy:  {Therapy; copd oxygen:11336}  Ventilator:    {[facility] Vent VGCW:956451881}    Rehab Therapies: {THERAPEUTIC INTERVENTION:7274269790}  Weight Bearing Status/Restrictions: {[facility] Weight Bearin}  Other Medical Equipment (for information only, NOT a DME order):  {EQUIPMENT:364713750}  Other Treatments: ***    Patient's personal belongings (please select all that are sent with patient):  {[facility] DME Belongings:511894871}    RN SIGNATURE:  {Esignature:947550915}    CASE MANAGEMENT/SOCIAL WORK SECTION    Inpatient Status Date: 1/3/2023    Readmission Risk Assessment Score:20  Readmission Risk              Risk of Unplanned Readmission:  40           Pt has no CM needs DC home with family to transport. / signature: Electronically signed by Esa Alvarado RN on 1/10/23 at 3:11 PM EST    PHYSICIAN SECTION    Prognosis: {Prognosis:6615174092}    Condition at Discharge: [facility] Patient Condition:948093403}    Rehab Potential (if transferring to Rehab): {Prognosis:0717428420}    Recommended Labs or Other Treatments After Discharge: ***    Physician Certification: I certify the above information and transfer of Xenia Martinez  is necessary for the continuing treatment of the diagnosis listed and that she requires {Admit to Appropriate Level of Care:36423} for {GREATER/LESS:385984274} 30 days.      Update Admission H&P: {CHP DME Changes in FRMPL:389427567}    PHYSICIAN SIGNATURE:  {Esignature:509874738}

## 2023-01-10 NOTE — PROGRESS NOTES
[facility]   Nephrology progress Note  [phone]   [facility]. [facility]           SUMMARY :  We are following this patient for TORIBIO on CKD    78-year-old female with past medical history of CKD, A. fib, coronary artery disease s/p CABG, mitral valve replacement with bioprosthetic valve in 2018, T2DM, hyperlipidemia, presented to hospital with 2 to 3 weeks history of diarrhea accompanied by nausea vomiting and generalized weakness. She has had poor p.o. intake. For her right lower extremity cellulitis she had been treated with antibiotics. CT abdominal pelvis without IV contrast showed small bowel pneumatosis and air in the mesentery. Follows with me     SUBJECTIVE:   Patient progress reviewed. The patient is having lower back pain.  by bedside   1/6/23: swelling ankles ( was on Torsemide 80 mg BID)  1/7/23: less swelling ankles, cardiology consult - p  1/8/23: Nausea and vomiting x2 since last night  1/9/23: has severe lower back pain,some swelling of dependent feet   1/10/23:     Physical Exam:    VITALS:  /74   Pulse (!) 109   Temp 98.1 °F (36.7 °C) (Oral)   Resp 17   Ht 5' 6\" (1.676 m)   Wt 193 lb 3.2 oz (87.6 kg)   SpO2 94%   BMI 31.18 kg/m²   BLOOD PRESSURE RANGE:  Systolic (84XFQ), VMU:253 , Min:96 , BIA:425   ; Diastolic (44SBG), TPB:94, Min:51, Max:76    24HR INTAKE/OUTPUT:  No intake or output data in the 24 hours ending 01/10/23 1137  I/O ? Inaccurate     Gen:  alert, oriented x 3, in pain   PERRL , EOM +  Pallor +, No icterus  JVP not raised   CV: RRR no murmur or rub . Paced, Defibrillator- pacemaker +  Mid sternal scar of CABG   Systolic murmur at base of heart   Lungs:B/ L air entry, Normal breath sounds   Abd: soft, bowel sounds + , No organomegaly   Ext: Trace edema , no cyanosis  Skin: Warm.   No rash  Neuro: nonfocal.    DATA:    CBC with Differential:    Lab Results   Component Value Date/Time    WBC 5.3 01/10/2023 05:39 AM    RBC 3.82 01/10/2023 05:39 AM    HGB 11.5 01/10/2023 05:39 AM    HCT 35.3 01/10/2023 05:39 AM     01/10/2023 05:39 AM    MCV 92.2 01/10/2023 05:39 AM    MCH 30.2 01/10/2023 05:39 AM    MCHC 32.7 01/10/2023 05:39 AM    RDW 15.5 01/10/2023 05:39 AM    SEGSPCT 64.1 09/02/2020 11:37 AM    BANDSPCT 1 01/14/2019 07:26 PM    LYMPHOPCT 9.5 01/06/2023 07:14 AM    MONOPCT 10.0 01/06/2023 07:14 AM    BASOPCT 0.3 01/06/2023 07:14 AM    MONOSABS 0.9 01/06/2023 07:14 AM    LYMPHSABS 0.9 01/06/2023 07:14 AM    EOSABS 0.1 01/06/2023 07:14 AM    BASOSABS 0.0 01/06/2023 07:14 AM     CMP:    Lab Results   Component Value Date/Time     01/10/2023 05:39 AM    K 3.7 01/10/2023 05:39 AM    K 3.5 01/04/2023 06:00 AM     01/10/2023 05:39 AM    CO2 24 01/10/2023 05:39 AM    BUN 51 01/10/2023 05:39 AM    CREATININE 2.1 01/10/2023 05:39 AM    GFRAA 31 09/06/2022 08:49 AM    AGRATIO 0.8 01/10/2023 05:39 AM    LABGLOM 24 01/10/2023 05:39 AM    GLUCOSE 78 01/10/2023 05:39 AM    PROT 5.8 01/10/2023 05:39 AM    LABALBU 2.5 01/10/2023 05:39 AM    CALCIUM 8.6 01/10/2023 05:39 AM    BILITOT 0.8 01/10/2023 05:39 AM    ALKPHOS 116 01/10/2023 05:39 AM    AST 20 01/10/2023 05:39 AM    ALT 22 01/10/2023 05:39 AM     Magnesium:    Lab Results   Component Value Date/Time    MG 2.20 01/10/2023 05:39 AM     Phosphorus:    Lab Results   Component Value Date/Time    PHOS 3.2 01/10/2023 05:39 AM     Troponin:    Lab Results   Component Value Date/Time    TROPONINI 0.04 01/03/2023 01:57 PM     U/A:    Lab Results   Component Value Date/Time    NITRITE neg 10/17/2022 09:15 AM    COLORU Yellow 01/03/2023 10:00 PM    PROTEINU Negative 01/03/2023 10:00 PM    PHUR 5.5 01/03/2023 10:00 PM    WBCUA 4 01/03/2023 10:00 PM    RBCUA 0 01/03/2023 10:00 PM    YEAST neg 02/12/2021 09:45 AM    BACTERIA None Seen 01/03/2023 10:00 PM    CLARITYU CLOUDY 01/03/2023 10:00 PM    SPECGRAV 1.010 01/03/2023 10:00 PM    LEUKOCYTESUR SMALL 01/03/2023 10:00 PM    UROBILINOGEN 1.0 01/03/2023 10:00 PM    BILIRUBINUR Negative 01/03/2023 10:00 PM    BILIRUBINUR neg 10/17/2022 09:15 AM    BLOODU Negative 01/03/2023 10:00 PM    GLUCOSEU Negative 01/03/2023 10:00 PM         IMPRESSION/RECOMMENDATIONS:      Diagnosis and Plan :     CKD stage 4  Likely diabetic nephropathy  Baseline Cr 1.9, eGFR 29    TORIBIO with drop in urine output  Likely Pre Renal   Cr 2.1  Restart Torsemide at lower dose     sCHF     T2DM   Since 1990    HTN: since 2010   BP is normal         Giovanna Briseno MD

## 2023-01-10 NOTE — PROGRESS NOTES
[facility]   Phone: [phone]    Physical Therapy    [x] Initial Evaluation            [] Daily Treatment Note         [] Discharge Summary      Patient: Kt Hernandez   : 1945   MRN: [id number]   Date of Service:  1/10/2023  Admitting Diagnosis: <principal problem not specified>  Current Admission Summary: Kt Hernandez is a 69 y.o. female who presents to the emergency department with a chief complaint of general weakness. This began about 2 weeks ago with her just generally not feeling well with some off-and-on nausea vomiting and diarrhea. Denies any bloody emesis or bloody diarrhea. The middle of the night she states she began to notice numbness and tingling in her right foot but denies difficulty moving her extremities. Denies headache, visual changes, chest pain, shortness of breath, fevers, abdominal pain, dysuria, hematuria or any other symptoms. Past Medical History:  has a past medical history of Asthma, Atrial fibrillation ([address] Eosinophilia, Hemoptysis, HIGH CHOLESTEROL, Hx of blood clots, Hypertension, Irregular heart beat, Other specified gastritis without mention of hemorrhage, Palpitations, Skin cancer, and Type II or unspecified type diabetes mellitus without mention of complication, not stated as uncontrolled. Past Surgical History:  has a past surgical history that includes Cholecystectomy;  section; Colonoscopy (2017); skin biopsy; bronchoscopy (2016); Coronary artery bypass graft (2018); Mitral valve replacement (2018); transesophageal echocardiogram (2018); Tunneled venous catheter placement (Left, 2018); Cardiac catheterization (2018); Insertable Cardiac Monitor (Left, 2018); Colonoscopy (2014); Upper gastrointestinal endoscopy (N/A, 10/10/2018); Colonoscopy (10/10/2018); Colonoscopy (N/A, 2020); Pain management procedure (N/A, 2020);  Pain management procedure (Bilateral, 1/18/2021); Cardiac catheterization (5/2 and 5/3 2021); and IR KYPHOPLASTY THORACIC 1 VERTEBRAL BODY (12/8/2022). Discharge Recommendations: Xenia Martinez scored a 20/24 on the AM-PAC short mobility form. Current research shows that an AM-PAC score of 18 or greater is typically associated with a discharge to the patient's home setting. Based on the patient's AM-PAC score and their current functional mobility deficits, it is recommended that the patient have 2-3 sessions per week of Physical Therapy at d/c to increase the patient's independence. At this time, this patient demonstrates the endurance and safety to discharge home with (OP services) and a follow up treatment frequency of 2-3x/wk. Please see assessment section for further patient specific details. If patient discharges prior to next session this note will serve as a discharge summary. Please see below for the latest assessment towards goals. Pt expresses resistance to outpatient PT services, stating that she was receiving therapy services prior to hypoplasty on December 8, 2022. She reports that PT led to increased pain and as such, does not have a desire to return. Pt would likely benefit from services if pain is able to be controlled. DME Required For Discharge: no DME required at discharge  Precautions/Restrictions: high fall risk  Weight Bearing Restrictions: no restrictions  [] Right Upper Extremity  [] Left Upper Extremity [] Right Lower Extremity  [] Left Lower Extremity     Required Braces/Orthotics: no braces required   [] Right  [] Left  Positional Restrictions:no positional restrictions    Pre-Admission Information   Lives With: spouse                  Type of Home: house  Home Layout: one level, full flight down to finished basement with HR on L  Home Access:  3 step to enter with handrail. Handrails are located on B side.   Bathroom Layout:  walk in tub  Bathroom Equipment:  Pt reports having a \"bench\" and grab bars for her bathtub  Toilet Height: elevated height  Home Equipment: rollator - 4 wheeled walker, transport chair  Transfer Assistance: Independent without use of device  Ambulation Assistance: amb without device- supervision from     ADL Assistance:  pt reports IND with ADLs,  assists with LB ADLs prn (pt denies requiring assistance for LB dressing this date 1/10)  IADL Assistance:  shares with   Active :        [x] Yes                 [x] No  Current Employment: retired. Occupation: teacher  Hobbies: traveling  Recent Falls: denies     Examination   Vision:   Vision Gross Assessment: Impaired and Vision Corrective Device: wears glasses for reading  Hearing:   WFL  Observation:   General Observation:  Pt reclined in recliner at bedside  Posture:   Fair  Sensation:   WFL  ROM:   (B) LE AROM WFL  Strength:   (B) LE strength grossly WFL  Therapist Clinical Decision Making (Complexity): low complexity  Clinical Presentation: stable      Subjective  General: Pt sitting in recliner upon arrival. Pt agreeable to PT/OT eval, stating she feels much improved compared to previous attempt yesterday. Pain: 6/10. Location: low back  Pain Interventions: RN notified (lidocaine patch applied by RN near start of session)       Functional Mobility  Bed Mobility  Bed mobility not completed on this date. Comments: Pt in recliner at beginning and end of session. Transfers  Sit to stand transfer: supervision  Stand to sit transfer: supervision  Comments:  Ambulation  Surface:level surface  Assistive Device: no device  Assistance: stand by assistance  Distance: ~75 ft x2  Gait Mechanics: Pt ambulates with decreased step length, slow obie, wide NANETTE, no LOB. Comments:    Stair Mobility  Stair mobility not completed on this date. Comments:  Wheelchair Mobility:  No w/c mobility completed on this date.   Comments:  Balance  Static Sitting Balance: good: independent with functional balance in unsupported position  Dynamic Sitting Balance: fair (+): maintains balance at SBA/supervision without use of UE support  Static Standing Balance: fair (+): maintains balance at SBA/supervision without use of UE support  Dynamic Standing Balance: fair (+): maintains balance at SBA/supervision without use of UE support  Comments:    Other Therapeutic Interventions    Functional Outcomes  AM-PAC Inpatient Mobility Raw Score : 20              Cognition  WFL  Orientation:    alert and oriented x 4  Command Following:   [facility]    Education  Barriers To Learning: none  Patient Education: patient educated on goals, PT role and benefits, plan of care, general safety, discharge recommendations  Learning Assessment:  patient verbalizes and demonstrates understanding    Assessment  Activity Tolerance: Fair  Impairments Requiring Therapeutic Intervention: decreased functional mobility, decreased endurance, decreased balance, increased pain, decreased posture  Prognosis: good  Clinical Assessment: Pt presents as below her baseline function and would benefit from skilled PT services to promote safe return to PLOF. Safety Interventions: patient left in chair, call light within reach, gait belt, patient at risk for falls, nurse notified, patient up ad gino , and family/caregiver present    Plan  Frequency: 1-2 x/per week  Current Treatment Recommendations: strengthening, ROM, balance training, functional mobility training, transfer training, gait training, stair training, endurance training, patient/caregiver education, home exercise program, and safety education    Goals  Patient Goals:  To return home   Short Term Goals:  Time Frame: Prior to D/C  Patient will complete bed mobility at modified independent   Patient will complete transfers at [facility]   Patient will ambulate 150 ft with use of LRAD at [facility]  Patient will ascend/descend 12 stairs with (L) ascending handrail at supervision    Therapy Session Time      Individual Group Co-treatment   Time In     0831   Time Out     0901   Minutes     30     Timed Code Treatment Minutes:  15 Minutes  Total Treatment Minutes:  30       Electronically Signed By: Edin Bowman, PT  Edin Bowman, PT, DPT, [id number]

## 2023-01-10 NOTE — PROGRESS NOTES
Nutrition Note    RECOMMENDATIONS  PO Diet: Recommend advance diet as tolerated  ONS: Pt denied    NUTRITION ASSESSMENT   Pt triggered for LOS assessment. Admitted with diarrhea, N/V, decreased appetite x 2 weeks prior to admission likely r/t viral gastroenteritis per GI. Upon visiting, pt reported 15 lb in 2 weeks. Wt hx in EMR indicates no significant wt changes. Okay appetite currently and tolerating full liquid diet. Denied need for ONS as this time. RD will continue to monitor for diet advancement with adequate po intake. Nutrition Related Findings: Lytes obtained today WNL. HbA1c of 5.7% on 1/8. LBM 1/9, BS+. +2 BLE edema. Wounds: None  Nutrition Education:  Education not indicated   Nutrition Goals: PO intake 50% or greater, by next RD assessment, other (specify) (diet advancement)     MALNUTRITION ASSESSMENT   Acute Illness  Malnutrition Status: No malnutrition    NUTRITION DIAGNOSIS   Inadequate oral intake related to altered GI structure as evidenced by poor intake prior to admission    CURRENT NUTRITION THERAPIES  ADULT DIET; Full Liquid     PO Intake: Unable to assess (variable documented intakes ranging from 1-100%)   PO Supplement Intake:None Ordered    ANTHROPOMETRICS  Current Height: 5' 6\" (167.6 cm)  Current Weight: 193 lb 3.2 oz (87.6 kg)    Admission weight: 189 lb (85.7 kg) (standing wt)  Ideal Body Weight (IBW): 130 lbs  (59 kg)    Usual Bodyweight 200 lb (90.7 kg)       BMI: 31.2    COMPARATIVE STANDARDS  Energy (kcal):  7957-7548     Protein (g):         Fluid (mL/day):  4605-0038    The patient will be monitored per nutrition standards of care. Consult dietitian if additional nutrition interventions are needed prior to RD reassessment.      Sierra Randall MS, RD, LD    Contact: [phone]

## 2023-01-10 NOTE — CARE COORDINATION
Discharge note:      CM/SW has been notified of discharge. Patient noted to have the following needs at discharge. CM/SW has coordinated the following services:      Discharge Destination: Home with family   Transportation:          Comment: Pt refused HHC  and SNF. CM talked to them about the importance of one of these services. She still wants to go home with no CM needs. All CM/SW needs met, will sign off.     Electronically signed by Chichi Cason RN on 1/10/2023 at 3:10 PM

## 2023-01-10 NOTE — PROGRESS NOTES
Pharmacy to Dose Warfarin    Pharmacy consulted to dose warfarin for Afib. INR Goal: 2-3    INR today: 3.53    Assessment/Plan:  - INR supratherapeutic, trending down   - continue to hold warfarin today  - Possible concomitant drug-drug interactions include: amiodarone, levothyroxine     Pharmacy will continue to follow. Ernestine Lockwood, PharmD.   PGY-1 Resident  [id number]  01/10/23 7:28 AM

## 2023-01-10 NOTE — PROGRESS NOTES
CLINICAL PHARMACY NOTE: MEDS TO BEDS    Total # of Prescriptions Filled: 3   The following medications were delivered to the patient:   Torsemide 10 mg  Levothyroxine 175 mcg  Oxycodone 10 mg    Additional Documentation:  Delivered to patient spouse=signed  Ok to be delivered per Emiliano Campbell CPhT  Pt denied patches, methocarbamol and movantik

## 2023-01-10 NOTE — DISCHARGE INSTRUCTIONS
Please call to make an appointment with Dr. Stanislaw Joe in 1-2 weeks. Follow up with Cardiologist as previously scheduled. Follow up with Dr Wander Ivory in [place] for pain management as desired. Synthroid dose decreased from 100 to 87.5 mcg. Repeat TSH in 4-6 weeks with PCP.

## 2023-01-10 NOTE — PROGRESS NOTES
[facility]   Phone: [phone]    Occupational Therapy    [x] Initial Evaluation            [] Daily Treatment Note         [] Discharge Summary      Patient: Kt Hernandez   : 1945   MRN: [id number]   Date of Service:  1/10/2023    Admitting Diagnosis:  <principal problem not specified>  Current Admission Summary: Kt Hernandez is a 69 y.o. F with h/o CKD, atrial fibrillation, CAD/CABG, mitral valve replacement bioprosthetic 2018, hyperlipidemia, DM 2 who presented with intermittent diarrhea for the last 2 weeks; multiple episodes, loose foul-smelling stools associated with nausea , vomiting and poor appetite. Patient denied any history of C. difficile but recently completed a course of antibiotics for cellulitis. CT in the ER showed small bowel pneumatosis and venous air in the mesentery which is unchanged from prior CT 2022. Serum creatinine was also noted to be 2.4 on admission. Past Medical History:  has a past medical history of Asthma, Atrial fibrillation ([address] Eosinophilia, Hemoptysis, HIGH CHOLESTEROL, Hx of blood clots, Hypertension, Irregular heart beat, Other specified gastritis without mention of hemorrhage, Palpitations, Skin cancer, and Type II or unspecified type diabetes mellitus without mention of complication, not stated as uncontrolled. Past Surgical History:  has a past surgical history that includes Cholecystectomy;  section; Colonoscopy (2017); skin biopsy; bronchoscopy (2016); Coronary artery bypass graft (2018); Mitral valve replacement (2018); transesophageal echocardiogram (2018); Tunneled venous catheter placement (Left, 2018); Cardiac catheterization (2018); Insertable Cardiac Monitor (Left, 2018); Colonoscopy (2014); Upper gastrointestinal endoscopy (N/A, 10/10/2018); Colonoscopy (10/10/2018); Colonoscopy (N/A, 2020);  Pain management procedure (N/A, 12/18/2020); Pain management procedure (Bilateral, 1/18/2021); Cardiac catheterization (5/2 and 5/3 2021); and IR KYPHOPLASTY THORACIC 1 VERTEBRAL BODY (12/8/2022). Discharge Recommendations: Matthew Andino scored a 21/24 on the AM-PAC ADL Inpatient form. Current research shows that an AM-PAC score of 18 or greater is typically associated with a discharge to the patient's home setting. Based on the patient's AM-PAC score, and their current ADL deficits, it is recommended that the patient have 2-3 sessions per week of Occupational Therapy at d/c to increase the patient's independence. At this time, this patient demonstrates the endurance and safety to discharge home with outpatient PT (home vs OP services) and a follow up treatment frequency of 2-3x/wk. Please see assessment section for further patient specific details. If patient discharges prior to next session this note will serve as a discharge summary. Please see below for the latest assessment towards goals. DME Required For Discharge: patient has all required DME for discharge    Precautions/Restrictions: high fall risk  Weight Bearing Restrictions: no restrictions  [] Right Upper Extremity  [] Left Upper Extremity [] Right Lower Extremity  [] Left Lower Extremity     Required Braces/Orthotics: no braces required   [] Right  [] Left  Positional Restrictions:no positional restrictions    Pre-Admission Information   Lives With: spouse    Type of Home: house  Home Layout: one level  Home Access: level entry  Bathroom Layout:  walk in tub  Bathroom Equipment: grab bars in shower, hand held shower head  Toilet Height: standard height  Home Equipment: rollator - 4 wheeled walker  Transfer Assistance: Independent without use of device  Ambulation Assistance:Independent without use of device  ADL Assistance: independent with all ADL's  IADL Assistance: independent with homemaking tasks  Current Employment: retired.     Recent Falls: pt denies falls    Examination   Perception:   WFL  Observation:   General Observation:  pt with poor posture sitting in recliner upon arrival  Posture:   fair  Sensation:   WFL  Proprioception:    WFL  Tone:   Normotonic  Coordination Testing:   WFL    ROM:   (B) UE AROM WFL  Strength:   (B) UE strength grossly WFL    Therapist Clinical Decision Making (Complexity): low complexity  Clinical Presentation: stable      Subjective  General: pt sitting in the recliner upon arrival, poor posture noted. Pt denies pain   Pain: 0/10  Pain Interventions: pt meds prior to arrival        Activities of Daily Living  Basic Activities of Daily Living  General Comments: pt declined all ADLs  Instrumental Activities of Daily Living  No IADL completed on this date. Functional Mobility  Bed Mobility  Bed mobility not completed on this date. Comments: in recliner  Transfers  Sit to stand transfer:stand by assistance  Stand to sit transfer: stand by assistance  Comments:  Functional Mobility:  Sitting Balance: supervision. Standing Balance: stand by assistance. Functional Mobility: .  stand by assistance  Functional Mobility Device Use: no device  Functional Mobility Comment: mobility in hallway, no device used. Slower speed. Other Therapeutic Interventions    Functional Outcomes  AM-PAC Inpatient Daily Activity Raw Score: 21    Cognition  WFL  Orientation:    alert and oriented x 4  Command Following:   [facility]     Education  Barriers To Learning: none  Patient Education: patient educated on OT role and benefits, plan of care, discharge recommendations  Learning Assessment:  patient verbalizes and demonstrates understanding    Assessment  Activity Tolerance: tolerated well   Impairments Requiring Therapeutic Intervention: decreased functional mobility, decreased ADL status, decreased balance, decreased IADL  Prognosis: good  Clinical Assessment: pt not at baseline level of functioning, currently presents with increased pain.  Pt would benefit from outpatient PT upon discharge but is likely to refuse after conversation.   Safety Interventions: patient left in chair, call light within reach, gait belt, patient at risk for falls, and nurse notified, chair alarm off upon arrival, left off and RN notified    Plan  Frequency: 1-2 x/per week  Current Treatment Recommendations: strengthening, balance training, functional mobility training, and ADL/self-care training    Goals  Patient Goals: pt did not state   Short Term Goals:  Time Frame: discharge  Bed mobility mod I  Functional ADL transfer mod I  Functional mobility with independent with ADL/IADL tasks  UB ADLs independent   LB ADLs independent       Therapy Session Time     Individual Group Co-treatment   Time In    0831   Time Out    0900   Minutes    29        Timed Code Treatment Minutes:   14 minutes  Total Treatment Minutes:  29 minutes       Electronically Signed By: Yolanda Phillips, [address], OTR/L [id number]

## 2023-01-11 ENCOUNTER — CARE COORDINATION (OUTPATIENT)
Dept: CASE MANAGEMENT | Age: 78
End: 2023-01-11

## 2023-01-11 ENCOUNTER — TELEPHONE (OUTPATIENT)
Dept: CARDIOLOGY CLINIC | Age: 78
End: 2023-01-11

## 2023-01-11 NOTE — TELEPHONE ENCOUNTER
Bertin Toribioufmann,  called to inform NPRG the Pt was released from [facility] on 01/10 and a Doctor in the Hospital has stopped her med: magnesium oxi 400mg, spironolactone 25mg. They made a change in Torsemide 20mg to 10mg,  Please call to discuss.   Please advise

## 2023-01-11 NOTE — TELEPHONE ENCOUNTER
Spoke to patient and her spouse today she was discharged from the hospital 01/10/2023 no hospital follow up scheduled with NPRG. Where can patient be added? Also they made several medication changes and her feet and legs are swollen. Are these medication changes ok with NPRG?   Please advise

## 2023-01-11 NOTE — CARE COORDINATION
[facility] Care Transitions Initial Follow Up Call    Call within 2 business days of discharge: Yes    Care Transition Nurse contacted the family by telephone to perform post hospital discharge assessment. Verified name and  with family as identifiers. Provided introduction to self, and explanation of the Care Transition Nurse role. Patient: Keya Barrera Patient : 1945   MRN: [id number]  Reason for Admission:   Discharge Date: 1/10/23 RARS: Readmission Risk Score: 20.3      Last Discharge  Street       Date Complaint Diagnosis Description Type Department Provider    1/3/23 Extremity Weakness Nausea vomiting and diarrhea . .. ED to Hosp-Admission (Discharged) (ADMITTED) [facility] Baltazar Leventhal, MD; Serafina Koyanagi. .. Was this an external facility discharge? No Discharge Facility:     Challenges to be reviewed by the provider   Additional needs identified to be addressed with provider: No  none               Method of communication with provider: none. Pt's , HIPAA verified, states pt is doing well, no issues or concerns except hospitalist took her off or decreased several of her usual medications so he has reached out to the cardiologist office to see what Kanchan Guadalupe thinks. F/U appts listed below. Agreed to more CTC f/u calls. Care Transition Nurse reviewed discharge instructions with family who verbalized understanding. The family was given an opportunity to ask questions and does not have any further questions or concerns at this time. Were discharge instructions available to patient? Yes. Reviewed appropriate site of care based on symptoms and resources available to patient including: When to call 911. The family agrees to contact the PCP office for questions related to their healthcare. Advance Care Planning:   Does patient have an Advance Directive: not on file; education provided.     Medication reconciliation was NOT performed with family due to him reaching out to cardiology office for clarification. 1111F entered: no, not at this time. Was patient discharged with a pulse oximeter? no    Non-face-to-face services provided:  Obtained and reviewed discharge summary and/or continuity of care documents    Offered patient enrollment in the Remote Patient Monitoring (RPM) program for in-home monitoring:  next call . Care Transitions 24 Hour Call    Do you have a copy of your discharge instructions?: Yes  Do you have all of your prescriptions and are they filled?: Yes  Have you been contacted by a [address]?: No  Have you scheduled your follow up appointment?: Yes  How are you going to get to your appointment?: Car - family or friend to transport  [address]: [address]  Patient DME: Shower chair, Straight cane, Walker  Do you have support at home?: Partner/Spouse/SO  Do you feel like you have everything you need to keep you well at home?: Yes  Are you an active caregiver in your home?: No  Care Transitions Interventions  No Identified Needs         Follow Up  Future Appointments   Date Time Provider Tawana Young   1/18/2023 11:00 AM Jhonathan Duran MD [address]   3/14/2023  8:30 AM SOLEDAD Montgomery - CNS  Cardio [facility]   3/14/2023  9:00 AM SCHEDULE, [address]  Cardio [facility]       Care Transition Nurse provided contact information. Plan for follow-up call in 1-2 days based on severity of symptoms and risk factors.   Plan for next call: self management-N/V/D , med updates, f/u appts, discuss RPM    Trina Hutson RN

## 2023-01-12 ENCOUNTER — CARE COORDINATION (OUTPATIENT)
Dept: CASE MANAGEMENT | Age: 78
End: 2023-01-12

## 2023-01-12 NOTE — CARE COORDINATION
[facility] Care Transitions Follow Up Call    Care Transition Nurse contacted the patient by telephone to follow up after admission. Verified name and  with patient as identifiers. Patient: Kt Hernandez  Patient : 1945   MRN: [id number]  Reason for Admission:   Discharge Date: 1/10/23 RARS: Readmission Risk Score: 20.3    This nurse reached out to patient to see if the doctor's office had called back regarding medication changes, she stated no. This nurse offered to contact office to see if there was an updated answer. The pt was appreciative. This nurse sent a message to the MA that spoke to the family yesterday, awaiting  response.     Follow Up  Future Appointments   Date Time Provider Tawana Young   2023 11:00 AM Juan Pa MD [facility]   3/14/2023  8:30 AM SOLEDAD Saeed - CNS FF [facility]   3/14/2023  9:00 AM SCHEDULE, [place] DEVICE CHECK FF Card[facility]       Kimberli Sepulveda RN

## 2023-01-13 ENCOUNTER — OFFICE VISIT (OUTPATIENT)
Dept: FAMILY MEDICINE CLINIC | Age: 78
End: 2023-01-13

## 2023-01-13 ENCOUNTER — ANTI-COAG VISIT (OUTPATIENT)
Dept: FAMILY MEDICINE CLINIC | Age: 78
End: 2023-01-13

## 2023-01-13 ENCOUNTER — CARE COORDINATION (OUTPATIENT)
Dept: CASE MANAGEMENT | Age: 78
End: 2023-01-13

## 2023-01-13 VITALS
TEMPERATURE: 97.1 F | SYSTOLIC BLOOD PRESSURE: 88 MMHG | HEART RATE: 103 BPM | DIASTOLIC BLOOD PRESSURE: 56 MMHG | OXYGEN SATURATION: 99 %

## 2023-01-13 DIAGNOSIS — I15.9 SECONDARY HYPERTENSION: ICD-10-CM

## 2023-01-13 DIAGNOSIS — Z79.4 TYPE 2 DIABETES MELLITUS WITH STAGE 3B CHRONIC KIDNEY DISEASE, WITH LONG-TERM CURRENT USE OF INSULIN (HCC): ICD-10-CM

## 2023-01-13 DIAGNOSIS — E03.9 ACQUIRED HYPOTHYROIDISM: ICD-10-CM

## 2023-01-13 DIAGNOSIS — N18.9 ACUTE KIDNEY INJURY SUPERIMPOSED ON CKD (HCC): ICD-10-CM

## 2023-01-13 DIAGNOSIS — E11.649 HYPOGLYCEMIA ASSOCIATED WITH DIABETES (HCC): ICD-10-CM

## 2023-01-13 DIAGNOSIS — R19.7 DIARRHEA, UNSPECIFIED TYPE: Primary | ICD-10-CM

## 2023-01-13 DIAGNOSIS — E11.22 TYPE 2 DIABETES MELLITUS WITH STAGE 3B CHRONIC KIDNEY DISEASE, WITH LONG-TERM CURRENT USE OF INSULIN (HCC): ICD-10-CM

## 2023-01-13 DIAGNOSIS — I38 ACUTE ON CHRONIC SYSTOLIC HEART FAILURE DUE TO VALVULAR DISEASE (HCC): ICD-10-CM

## 2023-01-13 DIAGNOSIS — K63.89 PNEUMATOSIS INTESTINALIS: ICD-10-CM

## 2023-01-13 DIAGNOSIS — K31.84 GASTROPARESIS DUE TO DM (HCC): ICD-10-CM

## 2023-01-13 DIAGNOSIS — N18.32 TYPE 2 DIABETES MELLITUS WITH STAGE 3B CHRONIC KIDNEY DISEASE, WITH LONG-TERM CURRENT USE OF INSULIN (HCC): ICD-10-CM

## 2023-01-13 DIAGNOSIS — E11.43 GASTROPARESIS DUE TO DM (HCC): ICD-10-CM

## 2023-01-13 DIAGNOSIS — I50.23 ACUTE ON CHRONIC SYSTOLIC HEART FAILURE DUE TO VALVULAR DISEASE (HCC): ICD-10-CM

## 2023-01-13 DIAGNOSIS — N17.9 ACUTE KIDNEY INJURY SUPERIMPOSED ON CKD (HCC): ICD-10-CM

## 2023-01-13 LAB — INR BLD: 3.9

## 2023-01-13 RX ORDER — METOCLOPRAMIDE 5 MG/1
5 TABLET ORAL 2 TIMES DAILY WITH MEALS
Qty: 60 TABLET | Refills: 3 | Status: SHIPPED | OUTPATIENT
Start: 2023-01-13

## 2023-01-13 RX ORDER — TORSEMIDE 20 MG/1
20 TABLET ORAL DAILY
Qty: 90 TABLET | Refills: 1 | Status: SHIPPED | OUTPATIENT
Start: 2023-01-13

## 2023-01-13 ASSESSMENT — PATIENT HEALTH QUESTIONNAIRE - PHQ9
SUM OF ALL RESPONSES TO PHQ9 QUESTIONS 1 & 2: 2
2. FEELING DOWN, DEPRESSED OR HOPELESS: 1
SUM OF ALL RESPONSES TO PHQ QUESTIONS 1-9: 2
SUM OF ALL RESPONSES TO PHQ QUESTIONS 1-9: 2
1. LITTLE INTEREST OR PLEASURE IN DOING THINGS: 1
SUM OF ALL RESPONSES TO PHQ QUESTIONS 1-9: 2
SUM OF ALL RESPONSES TO PHQ QUESTIONS 1-9: 2

## 2023-01-13 NOTE — CARE COORDINATION
[facility] Care Transitions Follow Up Call    Care Transition Nurse contacted the family by telephone to follow up after admission. Verified name and  with family as identifiers. Patient: Dylan Oliva  Patient : 1945   MRN: [id number]  Reason for Admission:   Discharge Date: 1/10/23 RARS: Readmission Risk Score: 20.3      Needs to be reviewed by the provider   Additional needs identified to be addressed with provider: No  none             Method of communication with provider: none. Pt's , HIPAA verified,  states pt is doing well, no issues or concerns. He states he still has not heard from cardiology office but they went to a PCP f/u appt today and the PCP made some adjustments. He Denies wife has SOB, CP,just swelling in lower extremities. Agreed to more CTC f/u calls. Addressed changes since last contact:  none  Discussed follow-up appointments. If no appointment was previously scheduled, appointment scheduling offered: No.   Is follow up appointment scheduled within 7 days of discharge? Yes    Follow Up  Future Appointments   Date Time Provider Tawana Young   2023 11:00 AM Morgan Pat MD [address]   3/14/2023  8:30 AM SOLEDAD Bell - CNS FF [facility]   3/14/2023  9:00 AM SCHEDULE, [place] DEVICE CHECK FF Cardio [facility]     Non-[facility] follow up appointment(s):     Care Transition Nurse reviewed medical action plan with family and discussed any barriers to care and/or understanding of plan of care after discharge. Discussed appropriate site of care based on symptoms and resources available to patient including: When to call 911. The family agrees to contact the PCP office for questions related to their healthcare. Advance Care Planning:   not on file; education provided.      Patients top risk factors for readmission: medical condition-bowel issues, CAD  Interventions to address risk factors: Assessment and support for treatment adherence and medication management-bowel isues, CAD    Offered patient enrollment in the Remote Patient Monitoring (RPM) program for in-home monitoring:  not as yet . Care Transitions Subsequent and Final Call    Subsequent and Final Calls  Do you have any ongoing symptoms?: No  Have your medications changed?: Yes  Patient Reports: demadex, reglan  Do you have any questions related to your medications?: No  Do you currently have any active services?: No  Are you currently active with any services?: Home Health  Do you have any needs or concerns that I can assist you with?: No  Identified Barriers: None  Care Transitions Interventions  No Identified Needs  Other Interventions:             Care Transition Nurse provided contact information for future needs. Plan for follow-up call in 5-7 days based on severity of symptoms and risk factors.   Plan for next call: self management-bowel issues, CAD, Discuss RPM    Kerri Mccoy RN

## 2023-01-13 NOTE — PROGRESS NOTES
Subjective:      Patient ID: Paresh Zaragoza is a 69 y.o. female. CC: Patient presents for hospital follow-up. HPI Patient presents for a follow-up from [facility] in accompaniment of . Patient was admitted due to nausea, vomiting, and diarrhea. She was admitted on 1/3/2023 and discharged on 1/10/2023. Patient presented to the hospital with intermittent diarrhea for over 2 weeks. Stool testing did not demonstrate any infection. CT demonstrated small bowel pneumatosis which was unchanged from prior CT scan. Diarrhea resolved during hospital stay. Patient is back home now and started having vomiting episodes and feeling very distended most of the time. Does not seem to be related to what she is eating. Patient also developed acute on chronic renal failure and was taken off diuretic therapy during hospitalization and was told to resume torsemide at 10 mg daily. Since she is home now she is having increasing leg edema. Back pain was reevaluated during hospital stay with neurosurgical consultation. Patient has no acute neurosurgical disease but does demonstrate osteoarthritis. Atrial fibrillation was controlled and patient was maintained on current treatment plan including Coumadin. INR is been elevated since she came home from the hospital and Coumadin has been on hold. TSH was suppressed and patient was changed on thyroid management during hospitalization. Care Coordinator phone contact documented in Epic note for 1/11/2023.      Review of Systems      Patient Active Problem List   Diagnosis    Anticoagulated on Coumadin    Hypercholesteremia    Generalized osteoarthrosis, involving multiple sites    Eosinophilic gastritis    Paroxysmal SVT (supraventricular tachycardia) (HCC)    B12 deficiency    History of pulmonary embolism    Multiple pulmonary nodules    Asthma    Atrial fibrillation (HCC)    Hypertension    Coronary artery disease due to calcified coronary lesion Nonrheumatic mitral valve regurgitation    S/P MVR (mitral valve replacement)    S/P CABG x 3    Goiter    Primary osteoarthritis of both knees    Splenic infarct    Obesity, Class I, BMI 30-34.9    Chronic systolic CHF (congestive heart failure), NYHA class 3 (formerly [facility])    Degenerative disc disease, thoracic    Persistent atrial fibrillation (HCC)    S/P MVR (mitral valve repair)    Ischemic cardiomyopathy    Occlusion and stenosis of bilateral carotid arteries    Pneumatosis intestinalis    Aortic valve stenosis    Deep vein thrombosis (DVT) of non-extremity vein    Acute on chronic systolic heart failure due to valvular disease (formerly [facility])    Stage 4 chronic kidney disease (formerly [facility])    Acute kidney injury superimposed on CKD (formerly [facility])    Chronic diastolic heart failure (formerly [facility])    Atherosclerosis of superior mesenteric artery ([address]    ICD (implantable cardioverter-defibrillator) in place    Type 2 diabetes mellitus with stage 3b chronic kidney disease, with long-term current use of insulin ([address]    Acquired hypothyroidism    Hypercoagulable state, secondary ([address]    Chronic midline thoracic back pain    Chronic Compression fracture of thoracic vertebra (formerly [facility])    Diarrhea    Hyponatremia    Lumbar stenosis    Right leg weakness    Cardiomyopathy ([address]    Ileus ([address]       Outpatient Medications Marked as Taking for the 1/13/23 encounter (Office Visit) with Wilfredo Gamez MD   Medication Sig Dispense Refill    methocarbamol (ROBAXIN) 750 MG tablet Take 1 tablet by mouth 4 times daily as needed (back pain) 30 tablet 0    naloxegol (MOVANTIK) 25 MG TABS tablet Take 1 tablet by mouth every morning (before breakfast) 30 tablet 0    torsemide (DEMADEX) 10 MG tablet Take 1 tablet by mouth daily 30 tablet 3    levothyroxine (SYNTHROID) 175 MCG tablet Take 0.5 tablets by mouth Daily 30 tablet 3    oxyCODONE (OXY-IR) 10 MG immediate release tablet Take 1 tablet by mouth 3 times daily as needed for Pain for up to 20 days.  Max Daily Amount: 30 mg 20 tablet 0    nystatin (MYCOSTATIN) 429678 UNIT/ML suspension Take 5 mLs by mouth 4 times daily 100 mL 0    vitamin D (CHOLECALCIFEROL) 25 MCG (1000 UT) TABS tablet Take 1,000 Units by mouth Six times weekly      insulin glargine (LANTUS SOLOSTAR) 100 UNIT/ML injection pen Inject 16 Units into the skin every morning (Patient taking differently: Inject 10-15 Units into the skin every morning) 90 mL 1    midodrine (PROAMATINE) 2.5 MG tablet Take 1 tablet by mouth 3 times daily 90 tablet 2    vitamin D (ERGOCALCIFEROL) 1.25 MG (21469 UT) CAPS capsule TAKE ONE CAPSULE BY MOUTH ONCE WEEKLY 12 capsule 1    calcitRIOL (ROCALTROL) 0.25 MCG capsule 1 tablet Monday, Wednesday and Friday (Patient taking differently: 0.25 mcg 1 tablet 6 days, skipping Tuesdays) 30 capsule 3    predniSONE (DELTASONE) 10 MG tablet TAKE 1 TABLET AS NEEDED (EOSINOPHILIC GASATRITIS) 499 tablet 1    blood glucose test strips (FREESTYLE LITE) strip USE TO TEST BLOOD SUGAR FOUR TIMES A  strip 3    warfarin (COUMADIN) 5 MG tablet TAKE 1 TABLET DAILY 100 tablet 3    potassium chloride (KLOR-CON M) 10 MEQ extended release tablet TAKE 1 TABLET DAILY 90 tablet 1    metoprolol succinate (TOPROL XL) 50 MG extended release tablet TAKE 1 TABLET DAILY 90 tablet 1    montelukast (SINGULAIR) 10 MG tablet TAKE 1 TABLET NIGHTLY 90 tablet 1    insulin lispro, 1 Unit Dial, (HUMALOG KWIKPEN) 100 UNIT/ML SOPN USE SLIDING SCALE, 140-199, 2 UNITS, 200-249, 3 UNITS, 250-300, 4 UNITS, GREATER THAN 300, INJECT 5 UNITS 15 mL 2    metOLazone (ZAROXOLYN) 2.5 MG tablet TAKE 1 TABLET ON TUESDAY AND FRIDAY AND AS DIRECTED 30 tablet 1    amiodarone (CORDARONE) 200 MG tablet TAKE 1 TABLET DAILY (Patient taking differently: Take 100 mg by mouth daily) 60 tablet 5    albuterol sulfate  (90 Base) MCG/ACT inhaler USE 2 INHALATIONS EVERY 6 HOURS AS NEEDED FOR WHEEZING 25.5 g 2    ipratropium-albuterol (DUONEB) 0.5-2.5 (3) MG/3ML SOLN nebulizer solution Inhale 3 mLs into the lungs every 4 hours as needed for Shortness of Breath 120 each 0    FreeStyle Lancets MISC 1 each by Does not apply route 4 times daily 200 each 5    Blood Glucose Monitoring Suppl (FREESTYLE LITE) GAETANO 1 Device by Does not apply route 4 times daily      Insulin Pen Needle (BD PEN NEEDLE JANNET U/F) 32G X 4 MM MISC USE 1 PEN NEEDLE FOUR TIMES A  each 3    ACETAMINOPHEN PO Take 500 mg by mouth every 6 hours as needed       albuterol (PROVENTIL) (2.5 MG/3ML) 0.083% nebulizer solution Take 3 mLs by nebulization every 6 hours as needed for Wheezing 120 each 3    omeprazole (PRILOSEC) 20 MG delayed release capsule Take 20 mg by mouth daily      ondansetron (ZOFRAN) 4 MG tablet Take 1 tablet by mouth 3 times daily as needed for Nausea or Vomiting 30 tablet 0    aspirin 81 MG chewable tablet Take 1 tablet by mouth daily 30 tablet 3    vitamin B-12 500 MCG tablet Take 1 tablet by mouth daily 30 tablet 3       Allergies   Allergen Reactions    Mjdczktf-Mcuhpls-Mmcans [Fluocinolone] Shortness Of Breath    Ciprofloxacin Shortness Of Breath    Diovan [Valsartan] Shortness Of Breath    Flagyl [Metronidazole] Shortness Of Breath     Has taken diflucan at home 12/7/15    Metformin And Related [Metformin And Related] Shortness Of Breath    Benazepril      Other reaction(s): Not Recorded    Morphine      Bad reaction. \"makes her feel horrible\". Saxagliptin      Other reaction(s): Not Recorded    Levaquin [Levofloxacin] Rash       Social History     Tobacco Use    Smoking status: Never    Smokeless tobacco: Never   Substance Use Topics    Alcohol use: No       BP (!) 88/56 (Site: Left Upper Arm, Position: Sitting, Cuff Size: Large Adult)   Pulse (!) 103   Temp 97.1 °F (36.2 °C) (Infrared)   SpO2 99%       Objective:   Physical Exam  Constitutional:       General: She is not in acute distress. Appearance: She is well-developed. Neck:      Vascular: No carotid bruit.    Cardiovascular:      Rate and Rhythm: Normal rate and regular rhythm. Pulses:           Dorsalis pedis pulses are 2+ on the right side and 2+ on the left side. Posterior tibial pulses are 2+ on the right side and 2+ on the left side. Heart sounds: Murmur (Right sternal border) heard. Systolic murmur is present with a grade of 2/6. No friction rub. No gallop. Pulmonary:      Effort: Pulmonary effort is normal.      Breath sounds: Normal breath sounds. Abdominal:      General: Bowel sounds are increased. There is distension. Palpations: Abdomen is soft. Tenderness: There is abdominal tenderness in the epigastric area. There is no right CVA tenderness or left CVA tenderness. Musculoskeletal:         General: No tenderness. Normal range of motion. Right lower leg: Edema (2+ edema of both lower extremities) present. Left lower leg: Edema present. Right foot: Normal.      Left foot: Normal.   Skin:     Findings: No rash. Neurological:      Mental Status: She is alert and oriented to person, place, and time. Sensory: Sensation is intact. Motor: Motor function is intact. Psychiatric:         Behavior: Behavior is cooperative. Assessment:      Nidia Pineda was seen today for follow-up from hospital.    Diagnoses and all orders for this visit:    Diarrhea, unspecified type    Secondary hypertension  -     Basic Metabolic Panel; Future    Acquired hypothyroidism  -     TSH; Future    Acute kidney injury superimposed on CKD ([address]    Acute on chronic systolic heart failure due to valvular disease ([address]    Pneumatosis intestinalis    Type 2 diabetes mellitus with stage 3b chronic kidney disease, with long-term current use of insulin (HCC)    Hypoglycemia associated with diabetes ([address]    Gastroparesis due to DM ([address]    Other orders  -     torsemide (DEMADEX) 20 MG tablet; Take 1 tablet by mouth daily  -     metoclopramide (REGLAN) 5 MG tablet;  Take 1 tablet by mouth 2 times daily (with meals) Plan:      Hospital information reviewed with patient and     No clear etiology for the diarrhea but with intermittent vomiting and longstanding diabetes mellitus this is probably gastroparesis. Patient begun on a trial of metoclopramide before breakfast and supper. If her symptoms persist she will need a gastric emptying study. The hypoglycemia is probably secondary to acute renal failure and discussed decreasing her insulin down to 10 units into her blood sugars rebound. Patient can let me know in 2 weeks for further diabetic management    Recheck TSH in 1 month and kidney function studies. Torsemide was increased back to 20 mg daily discussed leg elevation, support stockings and minimizing salt in diet and avoiding nephrotoxic medications. RTC 1 month    Medical decision making of moderate complexity. Please note that this chart was generated using Dragon dictation software. Although every effort was made to ensure the accuracy of this automated transcription, some errors in transcription may have occurred.

## 2023-01-16 ENCOUNTER — OFFICE VISIT (OUTPATIENT)
Dept: CARDIOLOGY CLINIC | Age: 78
End: 2023-01-16

## 2023-01-16 VITALS
BODY MASS INDEX: 32.47 KG/M2 | SYSTOLIC BLOOD PRESSURE: 100 MMHG | HEIGHT: 66 IN | OXYGEN SATURATION: 98 % | DIASTOLIC BLOOD PRESSURE: 70 MMHG | WEIGHT: 202 LBS | HEART RATE: 94 BPM

## 2023-01-16 DIAGNOSIS — I35.0 NONRHEUMATIC AORTIC VALVE STENOSIS: ICD-10-CM

## 2023-01-16 DIAGNOSIS — Z95.1 S/P CABG X 3: ICD-10-CM

## 2023-01-16 DIAGNOSIS — N18.30 STAGE 3 CHRONIC KIDNEY DISEASE, UNSPECIFIED WHETHER STAGE 3A OR 3B CKD (HCC): ICD-10-CM

## 2023-01-16 DIAGNOSIS — I48.0 PAF (PAROXYSMAL ATRIAL FIBRILLATION) (HCC): ICD-10-CM

## 2023-01-16 DIAGNOSIS — I50.22 CHRONIC SYSTOLIC CHF (CONGESTIVE HEART FAILURE), NYHA CLASS 3 (HCC): Primary | ICD-10-CM

## 2023-01-16 DIAGNOSIS — I95.9 HYPOTENSION, UNSPECIFIED HYPOTENSION TYPE: ICD-10-CM

## 2023-01-16 DIAGNOSIS — Z95.810 ICD (IMPLANTABLE CARDIOVERTER-DEFIBRILLATOR), DUAL, IN SITU: ICD-10-CM

## 2023-01-16 NOTE — PATIENT INSTRUCTIONS
Cardioversion 1/17/2023 at 645 am arrival   Drink a glucerna every day  Wear support stocking  Continue all current medications  Needs follow up with EP  RTO in 3 weeks

## 2023-01-16 NOTE — PROGRESS NOTES
[facility]  Progress Note    Primary Care Doctor:  Arvilla Curling, MD    Chief Complaint   Patient presents with    Follow-Up from Hospital    Congestive Heart Failure    Edema     Legs feel heavy and skin feels very tight        History of Present Illness:  69 y.o. female with history of CAD/CABG in 2018, PAF with maze 2018, HTN, HLD, DVT/PE on coumadin, 2 CVs unsuccessful  2/26-3/10/21 for small bowel pneumatosis with loculated pneumoperitoneum (IV zoysn and TPN), acute on chronic sHF (torsemide decreased at discharge, aldactone was held and coreg dose decreased due to hypotension), TORIBIO on CKD, PAF  ICD placed 7/7/2021  covid (antibodies, steroids and antibiotics). She was on prednisone 11/16 to 12/8 (off for 2 days). 5/17-24/2022 for persistent cough, chest congestion pneumonia after failed po rounds of antibiotics, CT abdomen with pneumatosis and mesenteric gas and inflammation started on antibiotics which she has a couple days left. I had the pleasure of seeing Naila Cardozo in follow up for systolic heart failure and urgent visit for edema/weight gain. She is in a wheel chair and her , Taylor Matute. She was in the hospital 1/3-10/2023 for small bowel pneumatosis, creat 2.4 and richard stopped, torsemide decreased to 10 mg (increased by PCP last week to 20 mg). The diarrhea is much improved. Her weight has gone from 193->202. Her optival shows AF since November 14th and thoracic impedence is elevated. Her albumin has been <3.0 since may. She drinks 1-2 glucerna a month. Her legs are very edematous to thighs.     Phone call 1/11/2023    Past Medical History:   has a past medical history of Asthma, Atrial fibrillation ([address] Eosinophilia, Hemoptysis, HIGH CHOLESTEROL, Hx of blood clots, Hypertension, Irregular heart beat, Other specified gastritis without mention of hemorrhage, Palpitations, Skin cancer, and Type II or unspecified type diabetes mellitus without mention of complication, not stated as uncontrolled. Surgical History:   has a past surgical history that includes Cholecystectomy;  section; Colonoscopy (2017); skin biopsy; bronchoscopy (2016); Coronary artery bypass graft (2018); Mitral valve replacement (2018); transesophageal echocardiogram (2018); Tunneled venous catheter placement (Left, 2018); Cardiac catheterization (2018); Insertable Cardiac Monitor (Left, 2018); Colonoscopy (2014); Upper gastrointestinal endoscopy (N/A, 10/10/2018); Colonoscopy (10/10/2018); Colonoscopy (N/A, 2020); Pain management procedure (N/A, 2020); Pain management procedure (Bilateral, 2021); Cardiac catheterization ( and 5/3 2021); and IR KYPHOPLASTY THORACIC 1 VERTEBRAL BODY (2022). Social History:   reports that she has never smoked. She has never used smokeless tobacco. She reports that she does not drink alcohol and does not use drugs. Family History:   Family History   Problem Relation Age of Onset    Cancer Father     Asthma Mother     Hypertension Mother     Heart Disease Mother     High Blood Pressure Mother        Home Medications:  Prior to Admission medications    Medication Sig Start Date End Date Taking? Authorizing Provider   torsemide (DEMADEX) 20 MG tablet Take 1 tablet by mouth daily 23  Yes Aminata Sierra MD   metoclopramide (REGLAN) 5 MG tablet Take 1 tablet by mouth 2 times daily (with meals) 23  Yes Aminata Sierra MD   levothyroxine (SYNTHROID) 175 MCG tablet Take 0.5 tablets by mouth Daily 23  Yes Rupert Leonard MD   oxyCODONE (OXY-IR) 10 MG immediate release tablet Take 1 tablet by mouth 3 times daily as needed for Pain for up to 20 days.  Max Daily Amount: 30 mg 1/10/23 1/30/23 Yes Rupert Leonard MD   nystatin (MYCOSTATIN) 639765 UNIT/ML suspension Take 5 mLs by mouth 4 times daily 22  Yes SOLEDAD Mulligan CNP   insulin glargine (LANTUS SOLOSTAR) 100 UNIT/ML injection pen Inject 16 Units into the skin every morning  Patient taking differently: Inject 10-15 Units into the skin every morning 12/14/22  Yes Leandro Zeng MD   midodrine (PROAMATINE) 2.5 MG tablet Take 1 tablet by mouth 3 times daily 11/16/22  Yes Leandro Zeng MD   vitamin D (ERGOCALCIFEROL) 1.25 MG (93093 UT) CAPS capsule TAKE ONE CAPSULE BY MOUTH ONCE WEEKLY 11/16/22  Yes Leandro Zeng MD   calcitRIOL (ROCALTROL) 0.25 MCG capsule 1 tablet Monday, Wednesday and Friday  Patient taking differently: 0.25 mcg 1 tablet 6 days, skipping Tuesdays 11/16/22  Yes Leandro Zeng MD   predniSONE (DELTASONE) 10 MG tablet TAKE 1 TABLET AS NEEDED (EOSINOPHILIC GASATRITIS) 25/5/86  Yes Leandro Zeng MD   blood glucose test strips (FREESTYLE LITE) strip USE TO TEST BLOOD SUGAR FOUR TIMES A DAY 9/15/22  Yes Savage Reed MD   warfarin (COUMADIN) 5 MG tablet TAKE 1 TABLET DAILY 9/14/22  Yes Leandro Zeng MD   potassium chloride (KLOR-CON M) 10 MEQ extended release tablet TAKE 1 TABLET DAILY 8/31/22  Yes Leandro Zeng MD   metoprolol succinate (TOPROL XL) 50 MG extended release tablet TAKE 1 TABLET DAILY 8/31/22  Yes Leandro Zeng MD   montelukast (SINGULAIR) 10 MG tablet TAKE 1 TABLET NIGHTLY 8/31/22  Yes Leandro Zeng MD   insulin lispro, 1 Unit Dial, (HUMALOG KWIKPEN) 100 UNIT/ML SOPN USE SLIDING SCALE, 140-199, 2 UNITS, 200-249, 3 UNITS, 250-300, 4 UNITS, GREATER THAN 300, INJECT 5 UNITS 8/15/22  Yes Leandro Zeng MD   metOLazone (ZAROXOLYN) 2.5 MG tablet TAKE 1 TABLET ON TUESDAY AND FRIDAY AND AS DIRECTED 5/3/22  Yes SOLEDAD White - CNS   amiodarone (CORDARONE) 200 MG tablet TAKE 1 TABLET DAILY  Patient taking differently: Take 100 mg by mouth daily 3/10/22  Yes Adriana Adam MD   albuterol sulfate  (90 Base) MCG/ACT inhaler USE 2 INHALATIONS EVERY 6 HOURS AS NEEDED FOR WHEEZING 11/19/21  Yes Leandro Zeng MD   ipratropium-albuterol (Domenic Stands) 0.5-2.5 (3) MG/3ML SOLN nebulizer solution Inhale 3 mLs into the lungs every 4 hours as needed for Shortness of Breath 11/15/21  Yes Katia Turner MD   FreeStyle Lancets MISC 1 each by Does not apply route 4 times daily 9/7/21  Yes Katia Turner MD   Blood Glucose Monitoring Suppl (FREESTYLE LITE) GAETANO 1 Device by Does not apply route 4 times daily   Yes Historical Provider, MD   Insulin Pen Needle (BD PEN NEEDLE JANNET U/F) 32G X 4 MM MISC USE 1 PEN NEEDLE FOUR TIMES A DAY 6/14/21  Yes Katia Turner MD   ACETAMINOPHEN PO Take 500 mg by mouth every 6 hours as needed    Yes Historical Provider, MD   polyethylene glycol (GLYCOLAX) 17 GM/SCOOP powder Take 17 g by mouth as needed   Yes Historical Provider, MD   omeprazole (PRILOSEC) 20 MG delayed release capsule Take 20 mg by mouth daily   Yes Historical Provider, MD   ondansetron (ZOFRAN) 4 MG tablet Take 1 tablet by mouth 3 times daily as needed for Nausea or Vomiting 3/26/20  Yes Katia Turner MD   aspirin 81 MG chewable tablet Take 1 tablet by mouth daily 6/7/19  Yes Katia Turner MD   vitamin B-12 500 MCG tablet Take 1 tablet by mouth daily 10/12/18  Yes Fabricio Nazario MD   methocarbamol (ROBAXIN) 750 MG tablet Take 1 tablet by mouth 4 times daily as needed (back pain)  Patient not taking: Reported on 1/16/2023 1/10/23 2/9/23  Yeni Hess MD        Allergies:  Pjenjnrp-gwqsgrj-hlekaj [fluocinolone], Ciprofloxacin, Diovan [valsartan], Flagyl [metronidazole], Metformin and related [metformin and related], Benazepril, Morphine, Saxagliptin, and Levaquin [levofloxacin]     Review of Systems:   Constitutional: there has been no unanticipated weight loss. There's been no change in energy level, sleep pattern, or activity level. Eyes: No visual changes or diplopia. No scleral icterus. ENT: No Headaches, hearing loss or vertigo. No mouth sores or sore throat.   Cardiovascular: Reviewed in HPI  Respiratory: No cough or wheezing, no sputum production. No hematemesis. Gastrointestinal: No abdominal pain, appetite loss, blood in stools. No change in bowel or bladder habits. Genitourinary: No dysuria, trouble voiding, or hematuria. Musculoskeletal:  No gait disturbance, weakness or joint complaints. Integumentary: No rash or pruritis. Neurological: No headache, diplopia, change in muscle strength, numbness or tingling. No change in gait, balance, coordination, mood, affect, memory, mentation, behavior. Psychiatric: No anxiety, no depression. Endocrine: No malaise, fatigue or temperature intolerance. No excessive thirst, fluid intake, or urination. No tremor. Hematologic/Lymphatic: No abnormal bruising or bleeding, blood clots or swollen lymph nodes. Allergic/Immunologic: No nasal congestion or hives. Physical Examination:    Vitals:    01/16/23 1332   BP: 100/70   Site: Right Upper Arm   Position: Sitting   Cuff Size: Medium Adult   Pulse: 94   SpO2: 98%   Weight: 202 lb (91.6 kg)   Height: 5' 6\" (1.676 m)        Constitutional and General Appearance: Warm and dry, no apparent distress, normal coloration  HEENT:  Normocephalic, atraumatic  Respiratory:  Normal excursion and expansion without use of accessory muscles  Resp Auscultation: Normal breath sounds without dullness  Cardiovascular: The apical impulses not displaced  Heart tones are crisp and normal  JVP normal cm H2O  AF on optival since November 14th  Peripheral pulses are symmetrical and full  There is no clubbing, cyanosis of the extremities.   Bilateral ankle to lower thigh edema  Pedal Pulses: 2+ and equal   Abdomen:   No masses or tenderness  Liver/Spleen: No Abnormalities Noted  Neurological/Psychiatric:  Alert and oriented in all spheres  Moves all extremities well  Exhibits normal gait balance and coordination  No abnormalities of mood, affect, memory, mentation, or behavior are noted    Lab Data:    CBC:   Lab Results   Component Value Date/Time    WBC 5.3 01/10/2023 05:39 AM    WBC 5.8 01/09/2023 05:56 AM    WBC 5.5 01/08/2023 10:05 AM    RBC 3.82 01/10/2023 05:39 AM    RBC 4.16 01/09/2023 05:56 AM    RBC 4.04 01/08/2023 10:05 AM    HGB 11.5 01/10/2023 05:39 AM    HGB 12.4 01/09/2023 05:56 AM    HGB 12.2 01/08/2023 10:05 AM    HCT 35.3 01/10/2023 05:39 AM    HCT 38.4 01/09/2023 05:56 AM    HCT 37.4 01/08/2023 10:05 AM    MCV 92.2 01/10/2023 05:39 AM    MCV 92.1 01/09/2023 05:56 AM    MCV 92.5 01/08/2023 10:05 AM    RDW 15.5 01/10/2023 05:39 AM    RDW 16.0 01/09/2023 05:56 AM    RDW 16.0 01/08/2023 10:05 AM     01/10/2023 05:39 AM     01/09/2023 05:56 AM     01/08/2023 10:05 AM     BMP:  Lab Results   Component Value Date/Time     01/10/2023 05:39 AM     01/09/2023 05:56 AM     01/08/2023 10:04 AM    K 3.7 01/10/2023 05:39 AM    K 4.2 01/09/2023 05:56 AM    K 4.3 01/08/2023 10:04 AM    K 3.5 01/04/2023 06:00 AM    K 4.6 01/03/2023 01:57 PM    K 4.0 05/18/2022 04:23 AM     01/10/2023 05:39 AM     01/09/2023 05:56 AM     01/08/2023 10:04 AM    CO2 24 01/10/2023 05:39 AM    CO2 20 01/09/2023 05:56 AM    CO2 20 01/08/2023 10:04 AM    PHOS 3.2 01/10/2023 05:39 AM    PHOS 3.6 01/09/2023 05:56 AM    PHOS 4.0 01/08/2023 10:04 AM    BUN 51 01/10/2023 05:39 AM    BUN 60 01/09/2023 05:56 AM    BUN 68 01/08/2023 10:04 AM    CREATININE 2.1 01/10/2023 05:39 AM    CREATININE 2.3 01/09/2023 05:56 AM    CREATININE 2.5 01/08/2023 10:04 AM     BNP:   Lab Results   Component Value Date/Time    PROBNP 5,501 11/08/2022 03:24 PM    PROBNP 3,426 09/06/2022 08:49 AM    PROBNP 6,573 06/15/2022 10:26 AM     Cardiac Imaging:  Echo 1/6/2023   Summary   Left ventricular cavity size is normal with mild concentric left ventricular   hypertrophy. Ejection fraction is visually estimated to be 30%. There is severe diffuse global hypokinesis with akinesis of the apex, apical   septum, apical lateral, apical inferior, and apical anterior walls.    Cannot determine diastology due to mitral valve replacement. Left atrium is severely dilated. At least Mild aortic stenosis with a peak velocity of 2.4m/s and a mean   pressure gradient of 14mmHg. At least moderate aortic regurgitation. A bioprosthetic mitral valve is well seated with peak velocity of 2.1m/s and   a mean gradient of 7mmHg. No evidence of mitral regurgitation. Mild to moderate tricuspid regurgitation. RVSP is estimated to be 31-34 mmHG. IVC not well visualized. Echo 5/6/2022  Summary   Technically difficult examination due to visualization and irregular rhythm   Normal left ventricle size. There is moderate concentric left ventricular hypertrophy. Ejection fraction is visually estimated to be 40%. Overall left ventricular systolic function appears moderately reduced. There is akinesis of the apex walls. 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   No evidence of apical thrombus by contrast administration. A bioprosthetic mitral valve is well seated. Visually opens well. Cannot   adequately assess for dysfunction as gradients not obtained. The left atrium is moderately dilated. No pericardial effusion. JUDE Preliminary 5/4/2021 Dr Alli Welch  AV - area ~ 1.5, opens adequately, not severe aortic stenosis     [facility] 5/3/2021 Dr Alli Welch  Artery Findings/Result   LM Normal   LAD 50% mid, 70% mid, competitive flow distal from LIMA   Cx OM1 20% ostial to prox, superior branch mid OM1 50% ostial   RI N/A   S-D-RPL patent   L-LAD patent   RCA 50-60% distal, very heavily calcified   LVEDP 15   AV Peak to peak gradient 36mmHg, valve crossed easily with pigtail. LVG NA      Intervention:         None     Post Cath Dx:       Patent grafts     Echo 4/20/21  Summary   Overall left ventricular systolic function is severely depressed . Ejection fraction is visually estimated to be 10-15 %. E/e'= 23.2 GLS=-3.4   Moderately dilated left ventricle. There is severe diffuse hypokinesis. Indeterminate diastolic function. A bioprosthetic mitral valve is well seated with peak velocity of 1.59m/s   and a mean gradient of 3mmHg. The left atrium is moderately dilated. Mild aortic stenosis with a peak velocity of 2.5m/s and a mean pressure   gradient of 14mmHg. The aortic valve is thickened/calcified with decreased leaflet mobility   consistent with aortic stenosis. Mild to moderate tricuspid regurgitation. Estimated pulmonary artery systolic pressure is mildly to moderately   elevated at 46 mmHg assuming a right atrial pressure of 8 mmHg    11/30/2020 Dobutamine Echo   Dobutamine echocardiogram for aortic stenosis. Very technically limited exam due to atrial fibrillation. Baseline echocardiogram shows severe left ventricular dysfunction with   anterior, lateral and apical hypokinesis with an ejection fraction of 20-25%. There is no improvement in wall motion with low or high dose dobutamine   suggestive of nonviable myocardium. Mild prosthetic aortic valve stenosis with a mean gradient of 16mmHg and   peak velocity of 2.47m/s at rest. After dobutamine stress the mean AV   gradient rises to 20mmHg with 20mcg of dobutamine consistent with mild to   moderate aortic stenosis. Prosthetic mitral valve with a mean gradient of 7mmHg        JUDE 10/21/2020  Mildly dilated left ventricular size and normal wall thickness. Global left ventricular function is severely decreased with ejection fraction estimated at 25%. Patient is in atrial fibrillation during procedure. The bioprosthetic mitral valve is well seated with a mean gradient of 5mmHg and maximum pressure gradient of 15 mmHg with heart rate of 89 bpm. Pressure half time of 82 ms. There is trivial mitral regurgitation. Left atrial enlargement. Spontaneous echo contrast seen in the left atrium. A large stump of the left atrial appendage with no thrombus noted. Patient has history of surgical ligation of the left atrial appendage.  There are spontaneous echo contrast in the atrial appendage. The aortic valve is thickened/calcified with decreased leaflet mobility consistent with aortic stenosis. There is trivial aortic insufficiency. There is moderate tricuspid regurgitation. ECHO 9/15/20  Left ventricular cavity size is dilated. There is normal wall thickness with a moderately severe reduction in   systolic function. LV ejection fraction is visually estimated to be 25-30%. Indeterminate diastolic function. The right ventricle is not well visualized but appears to be normal in size with moderately reduced function. The left atrium is moderately dilated. A bioprosthetic mitral valve with a mean gradient of 7 mmHg. This may be a normal gradient for this valve but could suggest mild stenosis. Trivial mitral regurgitation. The aortic valve is thickened/calcified with a mean gradient of 16mmHg consistent with at least mild aortic stenosis. This is likely underestimated due to low cardiac output secondary to LV dysfunction. No significant aortic valve regurgitation. Moderate tricuspid regurgitation with RVSP of 48 mmHg. JUDE 11/1/2019  Normal left ventricular cavity size and wall thickness. Global left ventricular function is moderate-to-severely decreased with ejection fraction estimated from 35% to 40%. Severe apical akinesis noted. The bioprosthetic mitral valve is well seated with a mean gradient of 2mmHg and maximum pressure gradient of 5 mmHg. There is trivial mitral regurgitation. Left atrial enlargement. Spontaneous echo contrast seen in the left atrium. Stump of the left atrial appendage noted with no thrombus. The aortic valve is thickened/calcified with decreased leaflet mobility consistent with aortic stenosis. There is trivial aortic insufficiency    Assessment:    1. Chronic systolic heart failure due to valvular disease (HCC) BB and aldosterone antagonist; no ace/arb due ckd   2.  PAF (paroxysmal atrial fibrillation) ([address] on coumadin in nsr   3. S/P CABG x 3    4. Nonrheumatic aortic valve stenosis    5. Hypotension on midodrine   6. Chronic kidney disease   7.       ICD in place      Plan:   Patient Instructions   Cardioversion 1/17/2023 at 645 am arrival   Drink a glucerna every day  Wear support stocking  Continue all current medications  Needs follow up with EP  RTO in 3 weeks    I appreciate the opportunity of cooperating in the care of this individual.    SOLEDAD Burton - CNS, CNS, 1/16/2023, 4:04 PM

## 2023-01-17 ENCOUNTER — HOSPITAL ENCOUNTER (OUTPATIENT)
Dept: CARDIAC CATH/INVASIVE PROCEDURES | Age: 78
Discharge: HOME | End: 2023-01-17
Attending: INTERNAL MEDICINE | Admitting: INTERNAL MEDICINE
Payer: MEDICARE

## 2023-01-17 ENCOUNTER — TELEPHONE (OUTPATIENT)
Dept: ORTHOPEDIC SURGERY | Age: 78
End: 2023-01-17

## 2023-01-17 ENCOUNTER — TELEPHONE (OUTPATIENT)
Dept: FAMILY MEDICINE CLINIC | Age: 78
End: 2023-01-17

## 2023-01-17 VITALS
RESPIRATION RATE: 16 BRPM | SYSTOLIC BLOOD PRESSURE: 139 MMHG | DIASTOLIC BLOOD PRESSURE: 83 MMHG | TEMPERATURE: 98.5 F | HEART RATE: 104 BPM | BODY MASS INDEX: 32.47 KG/M2 | WEIGHT: 202 LBS | HEIGHT: 66 IN

## 2023-01-17 PROBLEM — K31.84 GASTROPARESIS DUE TO DM (HCC): Status: ACTIVE | Noted: 2023-01-17

## 2023-01-17 PROBLEM — E11.43 GASTROPARESIS DUE TO DM (HCC): Status: ACTIVE | Noted: 2023-01-17

## 2023-01-17 LAB
EKG ATRIAL RATE: 111 BPM
EKG ATRIAL RATE: 68 BPM
EKG DIAGNOSIS: NORMAL
EKG DIAGNOSIS: NORMAL
EKG Q-T INTERVAL: 352 MS
EKG Q-T INTERVAL: 424 MS
EKG QRS DURATION: 88 MS
EKG QRS DURATION: 90 MS
EKG QTC CALCULATION (BAZETT): 462 MS
EKG QTC CALCULATION (BAZETT): 467 MS
EKG R AXIS: 11 DEGREES
EKG R AXIS: 19 DEGREES
EKG T AXIS: 111 DEGREES
EKG T AXIS: 111 DEGREES
EKG VENTRICULAR RATE: 104 BPM
EKG VENTRICULAR RATE: 73 BPM

## 2023-01-17 PROCEDURE — 7100000010 HC PHASE II RECOVERY - FIRST 15 MIN

## 2023-01-17 PROCEDURE — 92960 CARDIOVERSION ELECTRIC EXT: CPT

## 2023-01-17 PROCEDURE — 93283 PRGRMG EVAL IMPLANTABLE DFB: CPT | Performed by: INTERNAL MEDICINE

## 2023-01-17 PROCEDURE — 85610 PROTHROMBIN TIME: CPT

## 2023-01-17 PROCEDURE — 93010 ELECTROCARDIOGRAM REPORT: CPT | Performed by: INTERNAL MEDICINE

## 2023-01-17 PROCEDURE — 92960 CARDIOVERSION ELECTRIC EXT: CPT | Performed by: INTERNAL MEDICINE

## 2023-01-17 PROCEDURE — 99152 MOD SED SAME PHYS/QHP 5/>YRS: CPT | Performed by: INTERNAL MEDICINE

## 2023-01-17 PROCEDURE — 93005 ELECTROCARDIOGRAM TRACING: CPT | Performed by: INTERNAL MEDICINE

## 2023-01-17 NOTE — TELEPHONE ENCOUNTER
Antonieta Thompson from [facility] called with INR Results for Pt     1.7 from Saturday 1/14/23.

## 2023-01-17 NOTE — TELEPHONE ENCOUNTER
Surgery and/or Procedure Scheduling     Contact Name: Sudarshan Regan  Surgical/Procedure Request: PATIENT IS REQ INJECTIONS IN JACIEL KNEES. LAST INJECTION WAS IN AUG OF 21. PLEASE ADVISE WITH INSURANCE APPROVAL. Sherrill Ontiveros    Patient Contact Number: [phone]

## 2023-01-17 NOTE — H&P
CC: ICM  HPI: Belkis Dsouza is a 77 y.o. female  female past medical history significant for CAD/CABG in 2018, PAF with WAYNE clip on 8/18 and maze procedure in 2018, ablation of atrial fibrillation, status post ILR, hypertension, hyperlipidemia, DVT/PE on Coumadin and systolic heart failure with severe LV dysfunction. She was admitted with palpitation tachycardia, found to be in atrial fibrillation. Treated initially with medication. Cardioversion was tried which was unsuccessful. She has been treated with amiodarone. She has history of severe LV dysfunction. Her ejection fraction on 11/30/2020 was 20%. She has been treated with medical therapy. Repeat echocardiography with ejection fraction of 10 to 15%. Admitted to [facility] 4/15/2021. She was treated with IV milrinone on admission and Lasix drip. She has diuresed well. IV milrinone has been stopped and Lasix drips was also stopped. 5/3/2021 underwent LHC which showed patent grafts      She has moderate aortic stenosis and underwent JUDE by interventional cardiology to assess the valve and does not appear to have severe aortic valve stenosis at this time therefore no intervention was recommended. She was loaded with amiodarone and then cardioversion was initially attempted which failed. On 5/4/2021 after JUDE cardioversion was done which was successful.       Recurrent Atrial fibrillation              Patient Active Problem List     Diagnosis Date Noted    Goiter 09/07/2018    S/P CABG x 3 08/22/2018    Coronary artery disease due to lipid rich plaque      Nonrheumatic mitral valve regurgitation      Encounter for loop recorder check 08/16/2018    Pneumoperitoneum 07/28/2018    PAF (paroxysmal atrial fibrillation) ([address]      Hypertension      Asthma 01/12/2018    Type 2 diabetes mellitus with hyperglycemia, with long-term current use of insulin ([address] 04/27/2017    Primary osteoarthritis of both knees 03/21/2017    Multiple pulmonary nodules 08/01/2016    History of pulmonary embolism      B12 deficiency 09/08/2014    Paroxysmal SVT (supraventricular tachycardia) ([address] 14/11/4470    Eosinophilic gastritis 97/91/8887    Generalized osteoarthrosis, involving multiple sites 03/25/2013    Long term current use of anticoagulant 12/19/2012    Hypercholesteremia 12/19/2012    Chronic diastolic heart failure (HCC)      Hypokalemia      Acute on chronic systolic CHF (congestive heart failure) ([address] 04/26/2021    Hypoglycemia 04/26/2021    Atrial fibrillation with rapid ventricular response (HCC)      Chest pain      Dyspnea on exertion      Acute kidney injury superimposed on CKD ([address]      Hypotension      Acute on chronic systolic heart failure due to valvular disease ([address] 02/26/2021    Stage 3 chronic kidney disease      Deep vein thrombosis (DVT) of non-extremity vein 12/15/2020    Aortic valve stenosis 11/03/2020    Pneumatosis intestinalis of small intestine 05/10/2020    Ischemic cardiomyopathy 01/20/2020    Occlusion and stenosis of bilateral carotid arteries 01/20/2020    Persistent atrial fibrillation ([address] 10/30/2019    S/P MVR (mitral valve repair)      Thoracic degenerative disc disease 06/07/2019    Chronic systolic CHF (congestive heart failure), NYHA class 3 ([address] 01/15/2019    Obesity, Class I, BMI 30-34.9      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 Ejection fraction is visually estimated to be 10-15 %. E/e'= 23.2 GLS=-3.4   Moderately dilated left ventricle. There is severe diffuse hypokinesis. Indeterminate diastolic function. A bioprosthetic mitral valve is well seated with peak velocity of 1.59m/s   and a mean gradient of 3mmHg. The left atrium is moderately dilated.    Mild aortic stenosis with a peak velocity of 2.5m/s and a mean pressure gradient of 14mmHg. The aortic valve is thickened/calcified with decreased leaflet mobility   consistent with aortic stenosis. Mild to moderate tricuspid regurgitation. Estimated pulmonary artery systolic pressure is mildly to moderately   elevated at 46 mmHg assuming a right atrial pressure of 8 mmHg. Stress test 8/7/2018       Summary    Small sized lateral completely reversible defect of mild intensity    consistent with ischemia in the territory of the LCx and/or LAD . Normal LV function. Overall findings represent a intermediate risk scan            I independently reviewed the cardiac diagnostic studies, ECG and relevant imaging studies. Lab Results   Component Value Date     LVEF 13 04/20/2021     LVEFMODE Echo 09/20/2019            Lab Results   Component Value Date     TSH 1.90 04/01/2021            Physical Examination:      Vitals:     05/18/21 1308   BP: 97/63   Pulse: 78   SpO2: 97%          Wt Readings from Last 3 Encounters:   05/18/21 193 lb (87.5 kg)   05/14/21 204 lb (92.5 kg)   05/10/21 197 lb (89.4 kg)         Constitutional: Oriented. No distress. Head: Normocephalic and atraumatic. Mouth/Throat: Oropharynx is clear and moist.   Eyes: Conjunctivae normal. EOM are normal.   Neck: Neck supple. No JVD present. Cardiovascular: Normal rate, Irregular rhythm, S1&S2. Pulmonary/Chest: Bilateral respiratory sounds. No rhonchi. Abdominal: Soft. No tenderness. Musculoskeletal: No tenderness. No edema    Lymphadenopathy: Has no cervical adenopathy. Neurological: Alert and oriented. Follows command, No Gross deficit   Skin: Skin is warm, No rash noted. Psychiatric: Has a normal behavior            Review of System:  [x] Full ROS obtained and negative except as mentioned in HPI     Home Medications           Prior to Admission medications    Medication Sig Start Date End Date Taking?  Authorizing Provider   enoxaparin (LOVENOX) 100 MG/ML injection Inject 0.9 mLs into the skin 2 times daily 5/13/21   Yes Delfino Ramirez MD   spironolactone (ALDACTONE) 25 MG tablet Take 1 tablet by mouth 2 times daily 5/10/21   Yes Melissa Ramirez MD   magnesium oxide (MAG-OX) 400 MG tablet Take 1 tablet by mouth daily 5/10/21   Yes Melissa Ramirze MD   torsemide [facility]) 100 MG tablet Take 0.5 tablets by mouth 2 times daily 5/10/21   Yes Melissa Ramirez MD   amiodarone (CORDARONE) 200 MG tablet Take 1 tablet by mouth 2 times daily 5/5/21   Yes Latricia Guy MD   losartan (COZAAR) 25 MG tablet Take 1 tablet by mouth daily 5/5/21   Yes Latricia Guy MD   metoprolol succinate (TOPROL XL) 50 MG extended release tablet Take 1 tablet by mouth daily 5/5/21   Yes Latricia Guy MD   potassium chloride (KLOR-CON M) 10 MEQ extended release tablet TAKE 1 TABLET DAILY 4/21/21   Yes Chapis Lay MD   montelukast (SINGULAIR) 10 MG tablet TAKE 1 TABLET NIGHTLY 4/21/21   Yes Chaips Lay MD   warfarin (COUMADIN) 5 MG tablet TAKE 1 TABLET DAILY  Patient taking differently: Managed by PCP 4/21/21   Yes Chapis Lay MD   insulin glargine (LANTUS SOLOSTAR) 100 UNIT/ML injection pen INJECT 26 UNITS IN THE MORNING AND 18 UNITS AT BEDTIME  Patient taking differently: INJECT 26 UNITS IN THE MORNING AND 18 UNITS AT BEDTIME (evening when needed) 3/10/21   Yes Luis Enrique Palmer MD   exenatide (BYETTA 10 MCG PEN) 10 MCG/0.04ML injection Inject 0.04 mLs into the skin 2 times daily (with meals) 3/3/21   Yes Chapis Lay MD   OXYCODONE HCL PO Take 10 mg by mouth As of 1/21/2021,  states patient is taking 10mg with edge being taken off her pain after 3 hours.      Yes Historical Provider, MD   ACETAMINOPHEN PO Take 500 mg by mouth every 6 hours as needed      Yes Historical Provider, MD   blood glucose test strips (FREESTYLE LITE) strip USE TO TEST FOUR TIMES A DAY 1/5/21   Yes Chapis Lay MD   albuterol sulfate  (90 Base) MCG/ACT inhaler USE 2 INHALATIONS EVERY 6 HOURS AS NEEDED FOR WHEEZING 11/17/20   Yes Kaden Simon MD   albuterol (PROVENTIL) (2.5 MG/3ML) 0.083% nebulizer solution Take 3 mLs by nebulization every 6 hours as needed for Wheezing 6/2/20   Yes Kaden Simon MD   polyethylene glycol (GLYCOLAX) 17 GM/SCOOP powder Take 17 g by mouth every other day      Yes Historical Provider, MD   omeprazole (PRILOSEC) 20 MG delayed release capsule Take 20 mg by mouth daily     Yes Historical Provider, MD   FreeStyle Lancets MISC 1 each by Does not apply route 4 times daily 4/29/20   Yes Kaden Simon MD   ondansetron (ZOFRAN) 4 MG tablet Take 1 tablet by mouth 3 times daily as needed for Nausea or Vomiting 3/26/20   Yes Kaden Simon MD   Blood Glucose Monitoring Suppl (YappeACE PRO GLUCOSE METER) GAETANO 1 Device by Does not apply route 4 times daily 2/4/20   Yes Kaden Simon MD   HUMALOG KWIKPEN 100 UNIT/ML SOPN TAKE AS INSTRUCTED BY YOUR PRESCRIBER  Patient taking differently: Only if high blood sugar-has not been using 12/30/19   Yes Kaden Simon MD   nystatin (MYCOSTATIN) 394215 UNIT/ML suspension TAKE ONE TEASPOONFUL BY MOUTH FOUR TIMES A DAY FOR 5 DAYS 11/20/19   Yes Kaden Simon MD   aspirin 81 MG chewable tablet Take 1 tablet by mouth daily 6/7/19   Yes Kaden Simon MD   BD PEN NEEDLE JANNET U/F 32G X 4 MM MISC USE 1 PEN NEEDLE FOUR TIMES A DAY 3/22/19   Yes Kaden Simon MD   vitamin B-12 500 MCG tablet Take 1 tablet by mouth daily 10/12/18   Yes Jamie Stevenson MD                  Allergies   Allergen Reactions    [id number]-Jzkiclg-Czeqpk [Fluocinolone] Shortness Of Breath    Ciprofloxacin Shortness Of Breath    Diovan [Valsartan] Shortness Of Breath    Flagyl [Metronidazole] Shortness Of Breath       Has taken diflucan at home 12/7/15    Metformin And Related [Metformin And Related] Shortness Of Breath    Benazepril         Other reaction(s): Not Recorded    Morphine         Bad reaction. \"makes her feel horrible\".      Saxagliptin         Other reaction(s): Not Recorded    Levaquin [Levofloxacin] Rash         Social History:  Reviewed. reports that she has never smoked. She has never used smokeless tobacco. She reports that she does not drink alcohol and does not use drugs. Family History:  Reviewed. Reviewed. No family history of SCD. Assessment and plan:      - Ischemic cardiomyopathy, severe LV systolic function with EF: 10-15%, NYHAFC III, Stage C on medical therapy              EF per echo 4/20/2021 10-15%              On GMT metoprolol 50 mg daily, torsemide 50 mg BID and spironolactone 25 mg BID        s/p ICD                -Persistent Atrial Fibrillation              ECG today shows Afib              On Amiodarone 200 mg BID and metoprolol              Discussed decreasing amiodarone after ICD implant and possible ablation if she can tolerate               ALT 10, AST 15, TSH 1.67 on 4/15/2021              S/p DCCV/JUDE 4/20/2021 Unsuccessful and DCCV 5/4/2021successfully converted after amiodarone loading              Incomplete WAYNE ligation and Maze 2018   recurrent Atrial fibrillation            -ILR              The CIED was interrogated and programmed and I supervised and reviewed all the data. All findings and changes are in device interrogation sheet and reflect my personal interpretation and changes and is scanned to Epic.               15. 4% AF/AT burden Last on 5/3/2021, longest 6 hours        -hx of DVT/PE              Continue coumadin,       -CKD              Follows with nephrology     -AS              Moderate AS              JUDE  5/4/2021 by :  AV - area ~ 1.5, opens adequately, not severe aortic stenosis     -CAD               [facility] 5/3/2021 showed patent grafts               S/p CABG 2018 with WAYNE clip      Plan    cardioversion

## 2023-01-17 NOTE — PROCEDURES
[facility], [place]     Electrophysiology Procedure Note       Date of Procedure: 1/17/2023  Patient's Name: Preeti Fagan  YOB: 1945   Medical Record Number: [id number]  Procedure Performed by: Angela Abbasi MD    Procedures performed:  IV sedation. Programming of dual-chamber ICD  External Electrical cardioversion   Mallampati3  ASA 3    Indication of the procedure: Persistent atrial fibrillation /atrial flutter      Details of procedure: The patient was brought to the cath lab area in a fasting and non-sedated state. The risks, benefits and alternatives of the procedure were discussed with the patient. The patient opted to proceed with the procedure. Written informed consent was signed and placed in the chart. A timeout protocol was completed to identify the patient and the procedure being performed. I pushed 55 mg Brevital.   We monitored the patient's level of consciousness and vital signs/physiologic status throughout the procedure duration (see start and stop times below). Sedation:     Sedation start: 0754  Sedation stop: 0820     Patient is on chronic anticoagulation therapy. Then we used Brevital for sedation and electrical DC cardioversion was perfomred using 200J, synchronized shock. Patient was converted to sinus rhythm at 61 bpm. The patient tolerated the procedure well and there were no complications. Using the  the device was interrogated and testing was done. The atrial therapies were turned on and programmed. Conclusion:   Successful external DC cardioversion of atrial fibrillation / flutter . Programming of dual-chamber ICD  Plan:   The patient can be discharged if remains stable. Will continue with medical therapy. If she has recurrence of atrial fibrillation or flutter, we will consider ablation.

## 2023-01-17 NOTE — TELEPHONE ENCOUNTER
EUFLEXXA SERIES 8/10/21  BILATERAL KNEES    PER [facility] REFERRED TO VIGNESH FLOWER FOR INJECTIONS  SPOKE WITH PT SCHEDULED NP APPT FOR VISCO INJ.  IVETTE

## 2023-01-18 ENCOUNTER — HOSPITAL ENCOUNTER (EMERGENCY)
Age: 78
Discharge: HOME | End: 2023-01-18
Payer: MEDICARE

## 2023-01-18 ENCOUNTER — APPOINTMENT (OUTPATIENT)
Dept: GENERAL RADIOLOGY | Age: 78
End: 2023-01-18
Payer: MEDICARE

## 2023-01-18 VITALS
OXYGEN SATURATION: 98 % | RESPIRATION RATE: 20 BRPM | SYSTOLIC BLOOD PRESSURE: 107 MMHG | HEART RATE: 91 BPM | DIASTOLIC BLOOD PRESSURE: 76 MMHG | TEMPERATURE: 99 F

## 2023-01-18 DIAGNOSIS — S52.501A CLOSED FRACTURE OF DISTAL END OF RIGHT RADIUS, UNSPECIFIED FRACTURE MORPHOLOGY, INITIAL ENCOUNTER: Primary | ICD-10-CM

## 2023-01-18 PROCEDURE — 73110 X-RAY EXAM OF WRIST: CPT

## 2023-01-18 PROCEDURE — 29125 APPL SHORT ARM SPLINT STATIC: CPT

## 2023-01-18 PROCEDURE — 99283 EMERGENCY DEPT VISIT LOW MDM: CPT

## 2023-01-18 ASSESSMENT — PAIN SCALES - GENERAL: PAINLEVEL_OUTOF10: 9

## 2023-01-18 ASSESSMENT — PAIN - FUNCTIONAL ASSESSMENT: PAIN_FUNCTIONAL_ASSESSMENT: 0-10

## 2023-01-18 ASSESSMENT — ENCOUNTER SYMPTOMS
CHEST TIGHTNESS: 0
VOMITING: 0
ABDOMINAL PAIN: 0
DIARRHEA: 0
SHORTNESS OF BREATH: 0
NAUSEA: 0

## 2023-01-18 NOTE — ED PROVIDER NOTES
[facility]        Pt Name: Vickey Jensen  MRN: [id number]  [place] 1945  Date of evaluation: 1/18/2023  Provider: SOLEDAD Loera - CNP  PCP: Danilo Lockwood MD  Note Started: 12:25 AM EST 1/18/23      DELFIN. I have evaluated this patient. My supervising physician was available for consultation. CHIEF COMPLAINT       Chief Complaint   Patient presents with    Wrist Injury     Patient states fell about 30 mins ago getting up from her chair, c/o R wrist pain. HISTORY OF PRESENT ILLNESS: 1 or more Elements     History from : Patient    Limitations to history : None    Vickey Jensen is a 69 y.o. female who presents to the emergency department with right wrist injury. The patient denies mitigating exacerbating factors. States that she did lose her balance, fell landing on an outstretched right wrist.  She did fall while getting up from her chair. She did not hit her head. There is no loss of consciousness. EMS did respond to assist the patient up and placed an Ace bandage over the affected extremity. Patient's  did drive her to the emergency department. Prior to arrival, she did take her home pain medication that she takes chronically for her back, 10 mg of Percocet. Denies any headache, fever, lightheadedness, dizziness, visual disturbances. No chest pain or pressure. No neck pain. No shortness of breath, cough, or congestion. No abdominal pain, nausea, vomiting, diarrhea, constipation, or dysuria. No rash. Nursing Notes were all reviewed and agreed with or any disagreements were addressed in the HPI. REVIEW OF SYSTEMS :      Review of Systems   Constitutional:  Negative for activity change, chills and fever. Respiratory:  Negative for chest tightness and shortness of breath. Cardiovascular:  Negative for chest pain.    Gastrointestinal:  Negative for abdominal pain, diarrhea, nausea and vomiting. Genitourinary:  Negative for dysuria. Musculoskeletal:         Right wrist injury   All other systems reviewed and are negative. Positives and Pertinent negatives as per HPI.      SURGICAL HISTORY     Past Surgical History:   Procedure Laterality Date    BRONCHOSCOPY  2016    Dr. Xiomy Vizcrara - brushings from [address] [place] [address]  2018    Dr. Amisha Sexton and 5/3 2021    Cardiac cath, cardioversion and rafat     SECTION      CHOLECYSTECTOMY      COLONOSCOPY  2017    Dr. Bogdan Herrmann - sigmoid diverticulosis, polypectomies x3    COLONOSCOPY  2014    Dr. Bogdan Herrmann - sigmoid diverticulosis, polypectomies x3, internal hemorrhoids    COLONOSCOPY  10/10/2018    w/biopsy performed by Paula Taylor MD at [facility] N/A 2020    COLONOSCOPY DIAGNOSTIC performed by Xiomy Vizcarra MD at [address] [place] [address]  2018    Dr. Flynn Leavitt - x3 (LIMA-LAD, L SV-D1-PLV) modified BL MAZE procedure w/obliteration of WAYNE using 45mm AtriClip    INSERTABLE CARDIAC MONITOR Left 2018    Dr. Turner Solo [facility]# [id number] Medtronic    IR KYPHOPLASTY THORACIC FIRST LEVEL  2022    IR KYPHOPLASTY THORACIC FIRST LEVEL 2022 [facility] PROCEDURES    MITRAL VALVE REPLACEMENT  2018    Dr. Flynn Leavitt - 27mm Medtronic Cinch tissue valve    PAIN MANAGEMENT PROCEDURE N/A 2020    C6-C7 MIDLINE  EPIDURAL STEROID INJECTION WITH FLUOROSCOPY performed by Luis Guevara MD at [address] [place] [address] Bilateral 2021    BILATERAL T11 TRANSFORAMINAL EPIDURAL STEROID INJECTION WITH FLUOROSCOPY performed by Luis Guevara MD at [facility]      TRANSESOPHAGEAL ECHOCARDIOGRAM  2018    during CABG/MVR    TUNNELED VENOUS CATHETER PLACEMENT Left 2018    Dr. Catrachita Calderon for HD---since removed    UPPER GASTROINTESTINAL ENDOSCOPY N/A 10/10/2018    w/biopsy performed by Della Goyal MD at [facility]       Discharge Medication List as of 1/18/2023  1:48 AM        CONTINUE these medications which have NOT CHANGED    Details   torsemide (DEMADEX) 20 MG tablet Take 1 tablet by mouth daily, Disp-90 tablet, R-1Normal      metoclopramide (REGLAN) 5 MG tablet Take 1 tablet by mouth 2 times daily (with meals), Disp-60 tablet, R-3Normal      methocarbamol (ROBAXIN) 750 MG tablet Take 1 tablet by mouth 4 times daily as needed (back pain), Disp-30 tablet, R-0Normal      levothyroxine (SYNTHROID) 175 MCG tablet Take 0.5 tablets by mouth Daily, Disp-30 tablet, R-3Normal      oxyCODONE (OXY-IR) 10 MG immediate release tablet Take 1 tablet by mouth 3 times daily as needed for Pain for up to 20 days. Max Daily Amount: 30 mg, Disp-20 tablet, R-0Normal      nystatin (MYCOSTATIN) 032953 UNIT/ML suspension Take 5 mLs by mouth 4 times daily, Oral, 4 TIMES DAILY Starting Wed 12/21/2022, Disp-100 mL, R-0, Normal      insulin glargine (LANTUS SOLOSTAR) 100 UNIT/ML injection pen Inject 16 Units into the skin every morning, Disp-90 mL, R-1NO PRINT      midodrine (PROAMATINE) 2.5 MG tablet Take 1 tablet by mouth 3 times daily, Disp-90 tablet, R-2Normal      vitamin D (ERGOCALCIFEROL) 1.25 MG (95392 UT) CAPS capsule TAKE ONE CAPSULE BY MOUTH ONCE WEEKLY, Disp-12 capsule, R-1Normal      calcitRIOL (ROCALTROL) 0.25 MCG capsule 1 tablet Monday, Wednesday and Friday, Disp-30 capsule, R-3Normal      predniSONE (DELTASONE) 10 MG tablet TAKE 1 TABLET AS NEEDED (EOSINOPHILIC GASATRITIS), TFIN-476 tablet, R-1YOUR PATIENT HAS REQUESTED A REFILL OF THIS MEDICATION, PREVIOUSLY AUTHORIZED BY ANOTHER PRESCRIBER. Normal      blood glucose test strips (FREESTYLE LITE) strip USE TO TEST BLOOD SUGAR FOUR TIMES A DAY, Disp-200 strip, R-3Normal      warfarin (COUMADIN) 5 MG tablet TAKE 1 TABLET DAILY, Disp-100 tablet, R-3Normal      potassium chloride (KLOR-CON M) 10 MEQ extended release tablet TAKE 1 TABLET DAILY, Disp-90 tablet, R-1Normal      metoprolol succinate (TOPROL XL) 50 MG extended release tablet TAKE 1 TABLET DAILY, Disp-90 tablet, R-1Normal      montelukast (SINGULAIR) 10 MG tablet TAKE 1 TABLET NIGHTLY, Disp-90 tablet, R-1Normal      insulin lispro, 1 Unit Dial, (HUMALOG KWIKPEN) 100 UNIT/ML SOPN USE SLIDING SCALE, 140-199, 2 UNITS, 200-249, 3 UNITS, 250-300, 4 UNITS, GREATER THAN 300, INJECT 5 UNITS, Disp-15 mL, R-2Normal      metOLazone (ZAROXOLYN) 2.5 MG tablet TAKE 1 TABLET ON TUESDAY AND FRIDAY AND AS DIRECTED, Disp-30 tablet, R-1Normal      amiodarone (CORDARONE) 200 MG tablet TAKE 1 TABLET DAILY, Disp-60 tablet, R-5Normal      albuterol sulfate  (90 Base) MCG/ACT inhaler USE 2 INHALATIONS EVERY 6 HOURS AS NEEDED FOR WHEEZING, Disp-25.5 g, R-2Normal      ipratropium-albuterol (DUONEB) 0.5-2.5 (3) MG/3ML SOLN nebulizer solution Inhale 3 mLs into the lungs every 4 hours as needed for Shortness of Breath, Disp-120 each, R-0Normal      FreeStyle Lancets MISC 4 TIMES DAILY Starting Tue 9/7/2021, Disp-200 each, R-5, Normal      Blood Glucose Monitoring Suppl (FREESTYLE LITE) GAETANO 4 TIMES DAILY, Historical Med      Insulin Pen Needle (BD PEN NEEDLE JANNET U/F) 32G X 4 MM MISC Disp-360 each, R-3, NormalUSE 1 PEN NEEDLE FOUR TIMES A DAY      ACETAMINOPHEN PO Take 500 mg by mouth every 6 hours as needed Historical Med      polyethylene glycol (GLYCOLAX) 17 GM/SCOOP powder Take 17 g by mouth as neededHistorical Med      omeprazole (PRILOSEC) 20 MG delayed release capsule Take 20 mg by mouth dailyHistorical Med      ondansetron (ZOFRAN) 4 MG tablet Take 1 tablet by mouth 3 times daily as needed for Nausea or Vomiting, Disp-30 tablet, R-0Normal      aspirin 81 MG chewable tablet Take 1 tablet by mouth daily, Disp-30 tablet, R-3Normal      vitamin B-12 500 MCG tablet Take 1 tablet by mouth daily, Disp-30 tablet, R-3Normal             ALLERGIES     Umdmofpb-muyvcer-tijway [fluocinolone], Ciprofloxacin, Diovan [valsartan], Flagyl [metronidazole], Metformin and related [metformin and related], Benazepril, Morphine, Saxagliptin, and Levaquin [levofloxacin]    FAMILYHISTORY       Family History   Problem Relation Age of Onset    Cancer Father     Asthma Mother     Hypertension Mother     Heart Disease Mother     High Blood Pressure Mother         SOCIAL HISTORY       Social History     Tobacco Use    Smoking status: Never    Smokeless tobacco: Never   Vaping Use    Vaping Use: Never used   Substance Use Topics    Alcohol use: No    Drug use: No       SCREENINGS        [place] Coma Scale  Eye Opening: Spontaneous  Best Verbal Response: Oriented  Best Motor Response: Obeys commands  [place] Coma Scale Score: 15                CIWA Assessment  BP: 107/76  Heart Rate: 91           PHYSICAL EXAM  1 or more Elements     ED Triage Vitals [01/18/23 0007]   BP Temp Temp Source Heart Rate Resp SpO2 Height Weight   107/76 99 °F (37.2 °C) Oral 91 20 98 % -- --       Physical Exam  Vitals and nursing note reviewed. Constitutional:       Appearance: She is well-developed. She is not diaphoretic. HENT:      Head: Normocephalic and atraumatic. Right Ear: External ear normal.      Left Ear: External ear normal.   Eyes:      General:         Right eye: No discharge. Left eye: No discharge. Neck:      Vascular: No JVD. Cardiovascular:      Rate and Rhythm: Normal rate. Pulses: Normal pulses. Pulmonary:      Effort: Pulmonary effort is normal. No respiratory distress. Breath sounds: Normal breath sounds. Abdominal:      Palpations: Abdomen is soft. Musculoskeletal:         General: Normal range of motion. Right wrist: Swelling and tenderness present. Skin:     General: Skin is warm and dry. Coloration: Skin is not pale. Neurological:      Mental Status: She is alert.    Psychiatric:         Behavior: Behavior normal.           DIAGNOSTIC RESULTS   LABS:    Labs Reviewed - No data to display    When ordered only abnormal lab results are displayed. All other labs were within normal range or not returned as of this dictation. EKG: When ordered, EKG's are interpreted by the Emergency Department Physician in the absence of a cardiologist.  Please see their note for interpretation of EKG. RADIOLOGY:   Non-plain film images such as CT, Ultrasound and MRI are read by the radiologist. Plain radiographic images are visualized and preliminarily interpreted by the ED Provider with the below findings:        Interpretation per the Radiologist below, if available at the time of this note:    XR WRIST RIGHT (MIN 3 VIEWS)   Final Result   Osteopenia with potential subtle fracture of the distal radial metaphysis. Diffuse degenerative joint disease with chondrocalcinosis of the triangular   fibrocartilage. Soft tissue swelling. No results found. No results found. PROCEDURES   Unless otherwise noted below, none     Procedures    CRITICAL CARE TIME (.cctime)       PAST MEDICAL HISTORY      has a past medical history of Asthma, Atrial fibrillation ([address] Eosinophilia, Hemoptysis, HIGH CHOLESTEROL, blood clots, Hypertension, Irregular heart beat, Other specified gastritis without mention of hemorrhage, Palpitations, Skin cancer, and Type II or unspecified type diabetes mellitus without mention of complication, not stated as uncontrolled. Chronic Conditions affecting Care: none    EMERGENCY DEPARTMENT COURSE and DIFFERENTIAL DIAGNOSIS/MDM:   Vitals:    Vitals:    01/18/23 0007   BP: 107/76   Pulse: 91   Resp: 20   Temp: 99 °F (37.2 °C)   TempSrc: Oral   SpO2: 98%       Patient was given the following medications:  Medications - No data to display          Is this patient to be included in the SEP-1 Core Measure due to severe sepsis or septic shock? No   Exclusion criteria - the patient is NOT to be included for SEP-1 Core Measure due to:   Infection is not suspected    CONSULTS: (Who and What was discussed)  None  Discussion with Other Profesionals : None    Social Determinants : None    Records Reviewed : None    CC/HPI Summary, DDx, ED Course, and Reassessment:     Briefly, this is a 69year old female who presents to the emergency department with right wrist injury. The patient denies mitigating exacerbating factors. States that she did lose her balance, fell landing on an outstretched right wrist.  She did fall while getting up from her chair. She did not hit her head. There is no loss of consciousness. EMS did respond to assist the patient up and placed an Ace bandage over the affected extremity. Patient's  did drive her to the emergency department. Prior to arrival, she did take her home pain medication that she takes chronically for her back, 10 mg of Percocet. XR WRIST RIGHT (MIN 3 VIEWS) (Final result)  Result time 01/18/23 01:20:36  Final result by Aaron Gardiner MD (01/18/23 01:20:36)                Impression:    Osteopenia with potential subtle fracture of the distal radial metaphysis. Diffuse degenerative joint disease with chondrocalcinosis of the triangular   fibrocartilage. She was placed in a velcro wrist splint and plan to refer to orthopedic on call, Dr. Jennifer Velozence, referral provided. I estimate there is LOW risk for FRACTURE, COMPARTMENT SYNDROME, DEEP VENOUS THROMBOSIS, SEPTIC ARTHRITIS, TENDON OR NEUROVASCULAR INJURY, thus I consider the discharge disposition reasonable. Disposition Considerations (include 1 Tests not done, Shared Decision Making, Pt Expectation of Test or Tx.): I did consider CT for further evaluation of potential fracture however the patient will be managed conservatively and seen by orthopedics, further testing will be ordered as needed. Shared decision making used throughout, patient is agreeable regarding plan of care and comfortable with discharge home.   Appropriate for outpatient management discharge home      I am the Primary Clinician of Record.     FINAL IMPRESSION      1. possible fracture of distal end of right radius, unspecified fracture morphology, initial encounter          DISPOSITION/PLAN     DISPOSITION Decision To Discharge 01/18/2023 01:31:53 AM      PATIENT REFERRED TO:  Erasmo Swanson MD  [address]  [address] [facility] [address]  [address] [place] [address]  [phone]    Schedule an appointment as soon as possible for a visit       DISCHARGE MEDICATIONS:  Discharge Medication List as of 1/18/2023  1:48 AM          DISCONTINUED MEDICATIONS:  Discharge Medication List as of 1/18/2023  1:48 AM                 (Please note that portions of this note were completed with a voice recognition program.  Efforts were made to edit the dictations but occasionally words are mis-transcribed.)    SOLEDAD Waddell CNP (electronically signed)           SOLEDAD Waddell CNP  01/18/23 0154

## 2023-01-18 NOTE — ED NOTES
Discharge and education instructions reviewed. Patient verbalized understanding, teach-back successful. Patient denied questions at this time. No acute distress noted. Patient instructed to follow-up as noted - return to emergency department if symptoms worsen. Patient verbalized understanding. Discharged per EDMD with discharged instructions.        Brenda Page RN  01/18/23 [id number]

## 2023-01-19 ENCOUNTER — OFFICE VISIT (OUTPATIENT)
Dept: ORTHOPEDIC SURGERY | Age: 78
End: 2023-01-19
Payer: MEDICARE

## 2023-01-19 VITALS — BODY MASS INDEX: 32.47 KG/M2 | RESPIRATION RATE: 16 BRPM | WEIGHT: 202 LBS | HEIGHT: 66 IN

## 2023-01-19 DIAGNOSIS — S52.591A OTHER CLOSED FRACTURE OF DISTAL END OF RIGHT RADIUS, INITIAL ENCOUNTER: Primary | ICD-10-CM

## 2023-01-19 LAB — INR BLD: 2.4

## 2023-01-19 PROCEDURE — 99203 OFFICE O/P NEW LOW 30 MIN: CPT | Performed by: STUDENT IN AN ORGANIZED HEALTH CARE EDUCATION/TRAINING PROGRAM

## 2023-01-19 PROCEDURE — 1123F ACP DISCUSS/DSCN MKR DOCD: CPT | Performed by: STUDENT IN AN ORGANIZED HEALTH CARE EDUCATION/TRAINING PROGRAM

## 2023-01-19 NOTE — PROGRESS NOTES
CHIEF COMPLAINT: Right wrist pain    DATE OF INJURY: 23    History:   Ms. Radha Doll 69 y.o. right handed female presents today for the first visit for evaluation of a right wrist pain / injury. The patient was referred by [facility] ED. This is evaluated as a personal injury. The pain began 2 days ago. She rates pain at 9/10. There was a history of injury. She fell backwards and tried to catch herself on an outstretched hand. She presented to the ED yesterday where Xrays were taken and she was splinted and asked to follow up with orthopedics. The patient has not taken NSAIDs. The patient's occupation is retired. Outside reports reviewed: ER records.     Past Medical History:   Diagnosis Date    Asthma     Atrial fibrillation (HCC)     Eosinophilia     Hemoptysis     HIGH CHOLESTEROL     Hx of blood clots     Hypertension     Irregular heart beat     Other specified gastritis without mention of hemorrhage     Palpitations     Skin cancer     basal and squamous    Type II or unspecified type diabetes mellitus without mention of complication, not stated as uncontrolled        Past Surgical History:   Procedure Laterality Date    BRONCHOSCOPY  2016    Dr. Kiana Kang - brushings from [facility]  2018    Dr. Lyn Weir and 5/3 2021    Cardiac cath, cardioversion and rafat     SECTION      CHOLECYSTECTOMY      COLONOSCOPY  2017    Dr. Kristofer Pantojaco - sigmoid diverticulosis, polypectomies x3    COLONOSCOPY  2014    Dr. Miner Bucco - sigmoid diverticulosis, polypectomies x3, internal hemorrhoids    COLONOSCOPY  10/10/2018    w/biopsy performed by Renea Mabry MD at [address] [facility] N/A 2020    COLONOSCOPY DIAGNOSTIC performed by Kiana Kang MD at [facility]  2018    Dr. Da Shabazz - x3 (LIMA-LAD, L SV-D1-PLV) modified BL MAZE procedure w/obliteration of WAYNE using 45mm AtriClip INSERTABLE CARDIAC MONITOR Left 08/16/2018    Dr. Quin Gordillo SN# [id number] [employer]    IR KYPHOPLASTY THORACIC FIRST LEVEL  12/8/2022    IR KYPHOPLASTY THORACIC FIRST LEVEL 12/8/2022 [facility] PROCEDURES    MITRAL VALVE REPLACEMENT  08/21/2018    Dr. Jess Zepeda - 27mm Medtronic Cinch tissue valve    PAIN MANAGEMENT PROCEDURE N/A 12/18/2020    C6-C7 MIDLINE  EPIDURAL STEROID INJECTION WITH FLUOROSCOPY performed by Dong Tovar MD at [address] [place] [address] Road Bilateral 1/18/2021    BILATERAL T11 TRANSFORAMINAL EPIDURAL STEROID INJECTION WITH FLUOROSCOPY performed by Dong Tovar MD at [facility]      TRANSESOPHAGEAL ECHOCARDIOGRAM  08/21/2018    during CABG/MVR    TUNNELED VENOUS CATHETER PLACEMENT Left 08/23/2018    Dr. David Ziegler - IJ for HD---since removed    UPPER GASTROINTESTINAL ENDOSCOPY N/A 10/10/2018    w/biopsy performed by Kimberly Beckford MD at [address] [place] [address]       Current Outpatient Medications on File Prior to Visit   Medication Sig Dispense Refill    torsemide (DEMADEX) 20 MG tablet Take 1 tablet by mouth daily 90 tablet 1    metoclopramide (REGLAN) 5 MG tablet Take 1 tablet by mouth 2 times daily (with meals) 60 tablet 3    methocarbamol (ROBAXIN) 750 MG tablet Take 1 tablet by mouth 4 times daily as needed (back pain) 30 tablet 0    levothyroxine (SYNTHROID) 175 MCG tablet Take 0.5 tablets by mouth Daily 30 tablet 3    oxyCODONE (OXY-IR) 10 MG immediate release tablet Take 1 tablet by mouth 3 times daily as needed for Pain for up to 20 days.  Max Daily Amount: 30 mg 20 tablet 0    nystatin (MYCOSTATIN) 707427 UNIT/ML suspension Take 5 mLs by mouth 4 times daily 100 mL 0    insulin glargine (LANTUS SOLOSTAR) 100 UNIT/ML injection pen Inject 16 Units into the skin every morning (Patient taking differently: Inject 10-15 Units into the skin every morning) 90 mL 1    midodrine (PROAMATINE) 2.5 MG tablet Take 1 tablet by mouth 3 times daily 90 tablet 2    vitamin D (ERGOCALCIFEROL) 1.25 MG (63859 UT) CAPS capsule TAKE ONE CAPSULE BY MOUTH ONCE WEEKLY 12 capsule 1    calcitRIOL (ROCALTROL) 0.25 MCG capsule 1 tablet Monday, Wednesday and Friday (Patient taking differently: 0.25 mcg 1 tablet 6 days, skipping Tuesdays) 30 capsule 3    predniSONE (DELTASONE) 10 MG tablet TAKE 1 TABLET AS NEEDED (EOSINOPHILIC GASATRITIS) 742 tablet 1    blood glucose test strips (FREESTYLE LITE) strip USE TO TEST BLOOD SUGAR FOUR TIMES A  strip 3    warfarin (COUMADIN) 5 MG tablet TAKE 1 TABLET DAILY 100 tablet 3    potassium chloride (KLOR-CON M) 10 MEQ extended release tablet TAKE 1 TABLET DAILY 90 tablet 1    metoprolol succinate (TOPROL XL) 50 MG extended release tablet TAKE 1 TABLET DAILY 90 tablet 1    montelukast (SINGULAIR) 10 MG tablet TAKE 1 TABLET NIGHTLY 90 tablet 1    insulin lispro, 1 Unit Dial, (HUMALOG KWIKPEN) 100 UNIT/ML SOPN USE SLIDING SCALE, 140-199, 2 UNITS, 200-249, 3 UNITS, 250-300, 4 UNITS, GREATER THAN 300, INJECT 5 UNITS 15 mL 2    metOLazone (ZAROXOLYN) 2.5 MG tablet TAKE 1 TABLET ON TUESDAY AND FRIDAY AND AS DIRECTED 30 tablet 1    amiodarone (CORDARONE) 200 MG tablet TAKE 1 TABLET DAILY (Patient taking differently: Take 100 mg by mouth daily) 60 tablet 5    albuterol sulfate  (90 Base) MCG/ACT inhaler USE 2 INHALATIONS EVERY 6 HOURS AS NEEDED FOR WHEEZING 25.5 g 2    ipratropium-albuterol (DUONEB) 0.5-2.5 (3) MG/3ML SOLN nebulizer solution Inhale 3 mLs into the lungs every 4 hours as needed for Shortness of Breath 120 each 0    FreeStyle Lancets MISC 1 each by Does not apply route 4 times daily 200 each 5    Blood Glucose Monitoring Suppl (FREESTYLE LITE) GAETANO 1 Device by Does not apply route 4 times daily      Insulin Pen Needle (BD PEN NEEDLE JANNET U/F) 32G X 4 MM MISC USE 1 PEN NEEDLE FOUR TIMES A  each 3    ACETAMINOPHEN PO Take 500 mg by mouth every 6 hours as needed       polyethylene glycol (GLYCOLAX) 17 GM/SCOOP powder Take 17 g by mouth as needed      omeprazole (PRILOSEC) 20 MG delayed release capsule Take 20 mg by mouth daily      ondansetron (ZOFRAN) 4 MG tablet Take 1 tablet by mouth 3 times daily as needed for Nausea or Vomiting 30 tablet 0    aspirin 81 MG chewable tablet Take 1 tablet by mouth daily 30 tablet 3    vitamin B-12 500 MCG tablet Take 1 tablet by mouth daily 30 tablet 3     No current facility-administered medications on file prior to visit.       Allergies   Allergen Reactions    Eocxbvch-Uyyfrop-Eltcsl [Fluocinolone] Shortness Of Breath    Ciprofloxacin Shortness Of Breath    Diovan [Valsartan] Shortness Of Breath    Flagyl [Metronidazole] Shortness Of Breath     Has taken diflucan at home 12/7/15    Metformin And Related [Metformin And Related] Shortness Of Breath    Benazepril      Other reaction(s): Not Recorded    Morphine      Bad reaction. \"makes her feel horrible\".     Saxagliptin      Other reaction(s): Not Recorded    Levaquin [Levofloxacin] Rash       Social History     Socioeconomic History    Marital status:      Spouse name: Not on file    Number of children: Not on file    Years of education: Not on file    Highest education level: Not on file   Occupational History    Not on file   Tobacco Use    Smoking status: Never    Smokeless tobacco: Never   Vaping Use    Vaping Use: Never used   Substance and Sexual Activity    Alcohol use: No    Drug use: No    Sexual activity: Not on file   Other Topics Concern    Not on file   Social History Narrative    Not on file     Social Determinants of Health     Financial Resource Strain: Not on file   Food Insecurity: Not on file   Transportation Needs: Not on file   Physical Activity: Not on file   Stress: Not on file   Social Connections: Not on file   Intimate Partner Violence: Not on file   Housing Stability: Not on file       Family History   Problem Relation Age of Onset    Cancer Father     Asthma Mother     Hypertension Mother     Heart Disease Mother     High Blood  Pressure Mother        Review of Systems:  I have reviewed the clinically relevant past medical history, medications, allergies, family history, social history, and 13 point Review of Systems from the patient's recent history form & documented any details relevant to today's presenting complaints in the history above. The patient's self-reported past medical history, medications, allergies, family history, social history, and Review of Systems form from 1/19/23 have been scanned into the chart under the \"Media\" tab.      Physical Examination:     Ms. August is a pleasant 77 y.o. female who presents today in no acute distress, awake, alert, and oriented.  Vitals:    01/19/23 0942   Resp: 16         There is swelling that can be seen, no change in the color. She has intact sensation to light touch throughout the bilateral hands in the median, radial, and ulnar nerve distribution and good radial pulses.  EPL/FPL/Interossei are intact bilaterally.    She is exquisitely tender at the distal radius. No ulnar styloid tenderness or carpal tenderness. Her range of motion is limited secondary to pain. Strength limited secondary to pain.       IMAGING:  Right wrist Xray's:  3 views taken at the ED demonstrating   There is diffuse osteopenia.  There may be a subtle fracture of the distal   radial metaphysis.  There is diffuse degenerative joint disease.  There is   chondrocalcinosis involving the triangular fibrocartilage.  There are   vascular calcifications.  There is soft tissue swelling.     Agree with radiologist read.       Assessment:     Right wrist  distal radius fracture  Afib  Hx of blood clots     Plan:     Natural history and expected course discussed. Questions answered.    Discussed that the fracture can be treated nonoperatively. I do recommend a short arm cast however she has moderate edema about the wrist and hand at this time. We will maintain the brace for one more week and bring her back for cast application  next week once swelling has gone down. She is requesting a refill of pain medication. Upon review of PDMP she was prescribed Oxycodone Hcl on 1/10 with an end date of 1/30/23. If she would like a refill she will need to follow up with her PCP as he does prescribe pain medication regularly for her. We did discuss a bowel regimen because she has been constipated. Tylenol as needed. Maintain wrist brace and non weight bearing right upper extremity. Follow up in 1 week for short arm cast application. Lauren Fletcher PA-C  Board Certified by the [employer] on Certification of [address]: This note was generated with use of a verbal recognition program and an attempt was made to check for errors. It is possible that there are still dictated errors within this office note. If so, please bring any significant errors to my attention for an addendum. All efforts were made to ensure that this office note is accurate.

## 2023-01-20 ENCOUNTER — ANTI-COAG VISIT (OUTPATIENT)
Dept: FAMILY MEDICINE CLINIC | Age: 78
End: 2023-01-20

## 2023-01-20 ENCOUNTER — CARE COORDINATION (OUTPATIENT)
Dept: CASE MANAGEMENT | Age: 78
End: 2023-01-20

## 2023-01-20 LAB — INR BLD: 2 (ref 0.88–1.12)

## 2023-01-26 ENCOUNTER — OFFICE VISIT (OUTPATIENT)
Dept: ORTHOPEDIC SURGERY | Age: 78
End: 2023-01-26
Payer: MEDICARE

## 2023-01-26 VITALS — HEIGHT: 66 IN | BODY MASS INDEX: 32.6 KG/M2 | RESPIRATION RATE: 16 BRPM

## 2023-01-26 DIAGNOSIS — S52.591A OTHER CLOSED FRACTURE OF DISTAL END OF RIGHT RADIUS, INITIAL ENCOUNTER: Primary | ICD-10-CM

## 2023-01-26 PROCEDURE — 1123F ACP DISCUSS/DSCN MKR DOCD: CPT | Performed by: STUDENT IN AN ORGANIZED HEALTH CARE EDUCATION/TRAINING PROGRAM

## 2023-01-26 PROCEDURE — 99213 OFFICE O/P EST LOW 20 MIN: CPT | Performed by: STUDENT IN AN ORGANIZED HEALTH CARE EDUCATION/TRAINING PROGRAM

## 2023-01-26 NOTE — PROGRESS NOTES
CHIEF COMPLAINT: Right wrist pain    DATE OF INJURY: 23    History:   Ms. Keanu Cramer 69 y.o. right handed female presents today for the first visit for evaluation of a right wrist pain / injury. The patient was referred by [facility] ED. This is evaluated as a personal injury. The pain began 2 days ago. She rates pain at 9/10. There was a history of injury. She fell backwards and tried to catch herself on an outstretched hand. She presented to the ED yesterday where Xrays were taken and she was splinted and asked to follow up with orthopedics. The patient has not taken NSAIDs. The patient's occupation is retired. Interval history: The patient presents for swelling check. We are hoping to apply a short arm cast today. She rates pain 10/010. She has been using tylenol with no relief. She reports elevated her arm on a cushion in her chair.        Past Medical History:   Diagnosis Date    Asthma     Atrial fibrillation (HCC)     Eosinophilia     Hemoptysis     HIGH CHOLESTEROL     Hx of blood clots     Hypertension     Irregular heart beat     Other specified gastritis without mention of hemorrhage     Palpitations     Skin cancer     basal and squamous    Type II or unspecified type diabetes mellitus without mention of complication, not stated as uncontrolled        Past Surgical History:   Procedure Laterality Date    BRONCHOSCOPY  2016    Dr. Sandra Carrillo - brushings from [address] [place] [address]  2018    Dr. Nuzhat Coleman and 5/3 2021    Cardiac cath, cardioversion and rafat     SECTION      CHOLECYSTECTOMY      COLONOSCOPY  2017    Dr. Cristela Blanco - sigmoid diverticulosis, polypectomies x3    COLONOSCOPY  2014    Dr. Cristela Blanco - sigmoid diverticulosis, polypectomies x3, internal hemorrhoids    COLONOSCOPY  10/10/2018    w/biopsy performed by Ta Fuentes MD at [facility] N/A 2020    COLONOSCOPY DIAGNOSTIC performed by Veena Bell MD at [address]  08/21/2018    Dr. Lorraine Schultz - x3 (LIMA-LAD, L SV-D1-PLV) modified BL MAZE procedure w/obliteration of WAYNE using 45mm AtriClip    INSERTABLE CARDIAC MONITOR Left 08/16/2018    Dr. Danyell Alvarado - Geronimo Uriostegui SN# [id number] [employer]    IR KYPHOPLASTY THORACIC FIRST LEVEL  12/8/2022    IR KYPHOPLASTY THORACIC FIRST LEVEL 12/8/2022 [facility] SPECIAL PROCEDURES    MITRAL VALVE REPLACEMENT  08/21/2018    Dr. Lorraine Schultz - 27mm Med[employer]onic Cinch tissue valve    PAIN MANAGEMENT PROCEDURE N/A 12/18/2020    C6-C7 MIDLINE  EPIDURAL STEROID INJECTION WITH FLUOROSCOPY performed by Sergo Melgar MD at [address] [place] [address] Bilateral 1/18/2021    BILATERAL T11 TRANSFORAMINAL EPIDURAL STEROID INJECTION WITH FLUOROSCOPY performed by Sergo Melgar MD at [facility]      TRANSESOPHAGEAL ECHOCARDIOGRAM  08/21/2018    during CABG/MVR    TUNNELED VENOUS CATHETER PLACEMENT Left 08/23/2018    Dr. Ovidio Braxton -  for HD---since removed    UPPER GASTROINTESTINAL ENDOSCOPY N/A 10/10/2018    w/biopsy performed by Alley Farris MD at [address]       Current Outpatient Medications on File Prior to Visit   Medication Sig Dispense Refill    torsemide (DEMADEX) 20 MG tablet Take 1 tablet by mouth daily 90 tablet 1    metoclopramide (REGLAN) 5 MG tablet Take 1 tablet by mouth 2 times daily (with meals) 60 tablet 3    methocarbamol (ROBAXIN) 750 MG tablet Take 1 tablet by mouth 4 times daily as needed (back pain) 30 tablet 0    levothyroxine (SYNTHROID) 175 MCG tablet Take 0.5 tablets by mouth Daily 30 tablet 3    nystatin (MYCOSTATIN) 311585 UNIT/ML suspension Take 5 mLs by mouth 4 times daily 100 mL 0    insulin glargine (LANTUS SOLOSTAR) 100 UNIT/ML injection pen Inject 16 Units into the skin every morning (Patient taking differently: Inject 10-15 Units into the skin every morning) 90 mL 1    midodrine (PROAMATINE) 2.5 MG tablet Take 1 tablet by mouth 3 times daily 90 tablet 2    vitamin D (ERGOCALCIFEROL) 1.25 MG (38658 UT) CAPS capsule TAKE ONE CAPSULE BY MOUTH ONCE WEEKLY 12 capsule 1    calcitRIOL (ROCALTROL) 0.25 MCG capsule 1 tablet Monday, Wednesday and Friday (Patient taking differently: 0.25 mcg 1 tablet 6 days, skipping Tuesdays) 30 capsule 3    predniSONE (DELTASONE) 10 MG tablet TAKE 1 TABLET AS NEEDED (EOSINOPHILIC GASATRITIS) 646 tablet 1    blood glucose test strips (FREESTYLE LITE) strip USE TO TEST BLOOD SUGAR FOUR TIMES A  strip 3    warfarin (COUMADIN) 5 MG tablet TAKE 1 TABLET DAILY 100 tablet 3    potassium chloride (KLOR-CON M) 10 MEQ extended release tablet TAKE 1 TABLET DAILY 90 tablet 1    metoprolol succinate (TOPROL XL) 50 MG extended release tablet TAKE 1 TABLET DAILY 90 tablet 1    montelukast (SINGULAIR) 10 MG tablet TAKE 1 TABLET NIGHTLY 90 tablet 1    insulin lispro, 1 Unit Dial, (HUMALOG KWIKPEN) 100 UNIT/ML SOPN USE SLIDING SCALE, 140-199, 2 UNITS, 200-249, 3 UNITS, 250-300, 4 UNITS, GREATER THAN 300, INJECT 5 UNITS 15 mL 2    metOLazone (ZAROXOLYN) 2.5 MG tablet TAKE 1 TABLET ON TUESDAY AND FRIDAY AND AS DIRECTED 30 tablet 1    amiodarone (CORDARONE) 200 MG tablet TAKE 1 TABLET DAILY (Patient taking differently: Take 100 mg by mouth daily) 60 tablet 5    albuterol sulfate  (90 Base) MCG/ACT inhaler USE 2 INHALATIONS EVERY 6 HOURS AS NEEDED FOR WHEEZING 25.5 g 2    ipratropium-albuterol (DUONEB) 0.5-2.5 (3) MG/3ML SOLN nebulizer solution Inhale 3 mLs into the lungs every 4 hours as needed for Shortness of Breath 120 each 0    FreeStyle Lancets MISC 1 each by Does not apply route 4 times daily 200 each 5    Blood Glucose Monitoring Suppl (FREESTYLE LITE) GAETANO 1 Device by Does not apply route 4 times daily      Insulin Pen Needle (BD PEN NEEDLE JANNET U/F) 32G X 4 MM MISC USE 1 PEN NEEDLE FOUR TIMES A  each 3    ACETAMINOPHEN PO Take 500 mg by mouth every 6 hours as needed       polyethylene glycol (GLYCOLAX) 17 GM/SCOOP powder Take 17 g by mouth as needed      omeprazole (PRILOSEC) 20 MG delayed release capsule Take 20 mg by mouth daily      ondansetron (ZOFRAN) 4 MG tablet Take 1 tablet by mouth 3 times daily as needed for Nausea or Vomiting 30 tablet 0    aspirin 81 MG chewable tablet Take 1 tablet by mouth daily 30 tablet 3    vitamin B-12 500 MCG tablet Take 1 tablet by mouth daily 30 tablet 3     No current facility-administered medications on file prior to visit. Allergies   Allergen Reactions    Mvmukcrw-Vejdxmw-Wvlmau [Fluocinolone] Shortness Of Breath    Ciprofloxacin Shortness Of Breath    Diovan [Valsartan] Shortness Of Breath    Flagyl [Metronidazole] Shortness Of Breath     Has taken diflucan at home 12/7/15    Metformin And Related [Metformin And Related] Shortness Of Breath    Benazepril      Other reaction(s): Not Recorded    Morphine      Bad reaction. \"makes her feel horrible\".      Saxagliptin      Other reaction(s): Not Recorded    Levaquin [Levofloxacin] Rash       Social History     Socioeconomic History    Marital status:      Spouse name: Not on file    Number of children: Not on file    Years of education: Not on file    Highest education level: Not on file   Occupational History    Not on file   Tobacco Use    Smoking status: Never    Smokeless tobacco: Never   Vaping Use    Vaping Use: Never used   Substance and Sexual Activity    Alcohol use: No    Drug use: No    Sexual activity: Not on file   Other Topics Concern    Not on file   Social History Narrative    Not on file     Social Determinants of Health     Financial Resource Strain: Not on file   Food Insecurity: Not on file   Transportation Needs: Not on file   Physical Activity: Not on file   Stress: Not on file   Social Connections: Not on file   Intimate Partner Violence: Not on file   Housing Stability: Not on file       Family History   Problem Relation Age of Onset    Cancer Father     Asthma Mother     Hypertension Mother     Heart Disease Mother     High Blood Pressure Mother        Review of Systems:  I have reviewed the clinically relevant past medical history, medications, allergies, family history, social history, and 13 point Review of Systems from the patient's recent history form & documented any details relevant to today's presenting complaints in the history above. The patient's self-reported past medical history, medications, allergies, family history, social history, and Review of Systems form from 1/19/23 have been scanned into the chart under the \"Media\" tab. Physical Examination:     Ms. Stella You is a pleasant 69 y.o. female who presents today in no acute distress, awake, alert, and oriented. Vitals:    01/26/23 1031   Resp: 16         There is swelling that can be seen, no change in the color. She has intact sensation to light touch throughout the bilateral hands in the median, radial, and ulnar nerve distribution and good radial pulses. EPL/FPL/Interossei are intact bilaterally. She is exquisitely tender at the distal radius. No ulnar styloid tenderness or carpal tenderness. Her range of motion is limited secondary to pain. Strength limited secondary to pain. There is still moderate to severe edema of the hand and fingers. IMAGING:  Right wrist Xray's:  3 views taken at the ED demonstrating   There is diffuse osteopenia. There may be a subtle fracture of the distal   radial metaphysis. There is diffuse degenerative joint disease. There is   chondrocalcinosis involving the triangular fibrocartilage. There are   vascular calcifications. There is soft tissue swelling. Agree with radiologist read. Assessment:     Right wrist  distal radius fracture  Afib  Hx of blood clots     Plan:     Natural history and expected course discussed. Questions answered. Discussed that the fracture can be treated nonoperatively.  I do recommend a short arm cast however she still has moderate edema about the hand and fingers at this time. We will place her in a splint for one more week and bring her back for cast application next week once swelling has gone down. She will contact her PCP for refill of her pain medication. Tylenol as needed. Maintain non weight bearing right upper extremity. Follow up in 1 week for short arm cast application. We do not need repeat Xrays. Jaclyn Ricks PA-C  Board Certified by the [employer] on Certification of [address] and [address]: This note was generated with use of a verbal recognition program and an attempt was made to check for errors. It is possible that there are still dictated errors within this office note. If so, please bring any significant errors to my attention for an addendum. All efforts were made to ensure that this office note is accurate.

## 2023-01-27 ENCOUNTER — TELEPHONE (OUTPATIENT)
Dept: FAMILY MEDICINE CLINIC | Age: 78
End: 2023-01-27

## 2023-01-27 ENCOUNTER — CARE COORDINATION (OUTPATIENT)
Dept: CASE MANAGEMENT | Age: 78
End: 2023-01-27

## 2023-01-27 ENCOUNTER — ANTI-COAG VISIT (OUTPATIENT)
Dept: FAMILY MEDICINE CLINIC | Age: 78
End: 2023-01-27

## 2023-01-27 LAB — INR BLD: 5.8

## 2023-01-27 NOTE — CARE COORDINATION
[facility] Transitions Follow Up Call    2023    Patient: Zuleyma Casarez  Patient : 1945   MRN: [id number]  Reason for Admission: N/V/D/ ED visit for possible right radius fracture  Discharge Date: 23 RARS: Readmission Risk Score: 20.3         Spoke with: Frederickben Tolbret  Patient reports she is feeling better. She denies nvd, pain, falls, sob, fever, chills, cough or fatigue. Appetite and fluid intake is good. No bladder or bowel problems. Had f/u and her wrist was still too swollen to cast. Splint remains intact. RPM offered, patient declined. No needs at this time. Will continue to follow. Care Transitions Follow Up Call    Needs to be reviewed by the provider   Additional needs identified to be addressed with provider: No  none             Method of communication with provider : none      Care Transition Nurse (CTN) contacted the patient by telephone to follow up after admission on 1/3/2023. Verified name and  with patient as identifiers. Addressed changes since last contact: none  Discussed follow-up appointments. If no appointment was previously scheduled, appointment scheduling offered: na.   Is follow up appointment scheduled within 7 days of discharge? Yes. Advance Care Planning:   Does patient have an Advance Directive: not on file. CTN reviewed discharge instructions, medical action plan and red flags with patient and discussed any barriers to care and/or understanding of plan of care after discharge. Discussed appropriate site of care based on symptoms and resources available to patient including: PCP  Specialist  Urgent care clinics  When to call 911. The patient agrees to contact the PCP office for questions related to their healthcare.      Patients top risk factors for readmission: falls  ineffective coping  medical condition-wrist fracture, dm, afib, chf  Interventions to address risk factors: Education of patient/family/caregiver/guardian to support self-management- Non-[facility] follow up appointment(s):     CTN provided contact information for future needs. Plan for follow-up call in 5-7 days based on severity of symptoms and risk factors. Plan for next call: symptom management-swelling  self management-           Care Transitions Subsequent and Final Call    Subsequent and Final Calls  Do you have any ongoing symptoms?: No  Have your medications changed?: No  Do you have any questions related to your medications?: No  Do you currently have any active services?: No  Are you currently active with any services?: Home Health  Do you have any needs or concerns that I can assist you with?: No  Identified Barriers: None  Care Transitions Interventions  Other Interventions:              Follow Up  Future Appointments   Date Time Provider Tawana Young   1/30/2023 10:00 AM Brendan Kraft MD [facility]   2/1/2023  9:45 AM Preston Vinson CHECK [facility]   2/2/2023 10:45 AM MUNDO Umaña [facility]   2/8/2023  1:30 PM SOLEDAD Wick - CNS [facility]   2/14/2023 11:00 AM Tonya Wilson MD [facility]   3/6/2023 11:00 AM SCHEDULE, Luz Maria Greenwood CHECK [facility]   3/14/2023  8:30 AM SOLEDAD Wick - CNS [facility]   3/14/2023  9:00 AM SCHEDULE, SHERYL DEVICE CHECK [facility]   4/10/2023 11:15 AM SCHEDULE, SHERYL OPTIVOL CHECK [facility]   5/22/2023  7:45 AM SCHEDULE, SHERYL OPTIVOL CHECK [facility]   6/26/2023 10:30 AM SCHEDULE, SHERYL OPTIVOL CHECK [facility]   7/6/2023  8:45 AM Maine Saavedra MD [facility]   8/7/2023  8:00 AM SCHEDULE, SHERYL OPTIVOL CHECK [facility]   9/18/2023  7:30 AM SCHEDULE, SHERYL OPTIVOL CHECK [facility]   10/23/2023  7:30 AM SCHEDULE, SHERYL OPTIVOL CHECK [facility]   11/27/2023  7:30 AM SCHEDULE, SHERYL OPTIVOL CHECK [facility]       Luh Oconnor LPN

## 2023-01-30 ENCOUNTER — ANTI-COAG VISIT (OUTPATIENT)
Dept: FAMILY MEDICINE CLINIC | Age: 78
End: 2023-01-30

## 2023-01-30 ENCOUNTER — OFFICE VISIT (OUTPATIENT)
Dept: ORTHOPEDIC SURGERY | Age: 78
End: 2023-01-30
Payer: MEDICARE

## 2023-01-30 VITALS — WEIGHT: 202 LBS | HEIGHT: 66 IN | BODY MASS INDEX: 32.47 KG/M2

## 2023-01-30 DIAGNOSIS — M25.561 RIGHT KNEE PAIN, UNSPECIFIED CHRONICITY: ICD-10-CM

## 2023-01-30 DIAGNOSIS — M25.562 LEFT KNEE PAIN, UNSPECIFIED CHRONICITY: ICD-10-CM

## 2023-01-30 DIAGNOSIS — M17.0 BILATERAL PRIMARY OSTEOARTHRITIS OF KNEE: Primary | ICD-10-CM

## 2023-01-30 LAB — INR BLD: 2.6

## 2023-01-30 PROCEDURE — 1123F ACP DISCUSS/DSCN MKR DOCD: CPT | Performed by: ORTHOPAEDIC SURGERY

## 2023-01-30 PROCEDURE — 99213 OFFICE O/P EST LOW 20 MIN: CPT | Performed by: ORTHOPAEDIC SURGERY

## 2023-01-30 NOTE — PROGRESS NOTES
Patient: Johny Carbajal  : 1945    MRN: [id number]    Date of Visit: 23    Attending Physician: Rebekah Kc    History of Present Illness  Ms. Jessica Zavala is a very pleasant 69 y.o. patient with a several year history of progressive BILATERAL knee pain. There is no precipitating event or trauma. The pain is located predominantly in the medial and anterior aspect of the LEFT knee and lateral portion of the right knee it is aggravated by weight bearing. Walking even short distances can be painful. Stair climbing is progressing becoming more difficult and painful. However, it can also awaken the patient at night.  She has tried the following interventions without sustained functional improvement:    Low-impact, structured therapy/exercise program  NSAID's/Tylenol Arthritis strength  Crtisone injections/viscosupplementation with success with GORMAN  Knee brace  Cane for ambulation    PMH/PSH:  Past Medical History:   Diagnosis Date    Asthma     Atrial fibrillation (HCC)     Eosinophilia     Hemoptysis     HIGH CHOLESTEROL     Hx of blood clots     Hypertension     Irregular heart beat     Other specified gastritis without mention of hemorrhage     Palpitations     Skin cancer     basal and squamous    Type II or unspecified type diabetes mellitus without mention of complication, not stated as uncontrolled      Patient Active Problem List   Diagnosis    Anticoagulated on Coumadin    Hypercholesteremia    Generalized osteoarthrosis, involving multiple sites    Eosinophilic gastritis    Paroxysmal SVT (supraventricular tachycardia) (HCC)    B12 deficiency    History of pulmonary embolism    Multiple pulmonary nodules    Asthma    Atrial fibrillation (HCC)    Hypertension    Coronary artery disease due to calcified coronary lesion    Nonrheumatic mitral valve regurgitation    S/P MVR (mitral valve replacement)    S/P CABG x 3    Goiter    Primary osteoarthritis of both knees    Splenic infarct    Obesity, Class I, BMI 30-34.9    Chronic systolic CHF (congestive heart failure), NYHA class 3 (HCC)    Degenerative disc disease, thoracic    Persistent atrial fibrillation (HCC)    S/P MVR (mitral valve repair)    Ischemic cardiomyopathy    Occlusion and stenosis of bilateral carotid arteries    Pneumatosis intestinalis    Aortic valve stenosis    Deep vein thrombosis (DVT) of non-extremity vein    Acute on chronic systolic heart failure due to valvular disease (HCC)    Stage 4 chronic kidney disease (HCC)    Acute kidney injury superimposed on CKD ([facility])    Chronic diastolic heart failure (HCC)    Atherosclerosis of superior mesenteric artery ([address]    ICD (implantable cardioverter-defibrillator) in place    Type 2 diabetes mellitus with stage 3b chronic kidney disease, with long-term current use of insulin (HCC)    Acquired hypothyroidism    Hypercoagulable state, secondary ([address]    Chronic midline thoracic back pain    Chronic Compression fracture of thoracic vertebra (HCC)    Diarrhea    Hyponatremia    Lumbar stenosis    Right leg weakness    Cardiomyopathy ([address]    Ileus ([address]    Gastroparesis due to DM [facility])     Past Surgical History:   Procedure Laterality Date    BRONCHOSCOPY  2016    Dr. Barb Duarte - brushings from [address] [place] [address]  2018    Dr. Albin Casillas and 5/3 2021    Cardiac cath, cardioversion and rafat     SECTION      CHOLECYSTECTOMY      COLONOSCOPY  2017    Dr. Farida Vang - sigmoid diverticulosis, polypectomies x3    COLONOSCOPY  2014    Dr. Farida Vang - sigmoid diverticulosis, polypectomies x3, internal hemorrhoids    COLONOSCOPY  10/10/2018    w/biopsy performed by Matt Simon MD at [facility] N/A 2020    COLONOSCOPY DIAGNOSTIC performed by Barb Duarte MD at [facility]  2018    Dr. Jose Miguel Mccoy - x3 (LIMA-LAD, L SV-D1-PLV) modified BL MAZE procedure w/obliteration of WAYNE using 45mm AtriClip    INSERTABLE CARDIAC MONITOR Left 08/16/2018    Dr. Valery Farmer # [id number] Medtr[employer]    IR KYPHOPLASTY THORACIC FIRST LEVEL  12/8/2022    IR KYPHOPLASTY THORACIC FIRST LEVEL 12/8/2022 F SPECIAL PROCEDURES    MITRAL VALVE REPLACEMENT  08/21/2018    Dr. Irma Melissa - 27mm Medtronic Cinch tissue valve    PAIN MANAGEMENT PROCEDURE N/A 12/18/2020    C6-C7 MIDLINE  EPIDURAL STEROID INJECTION WITH FLUOROSCOPY performed by Gurinder Mike MD at [address] [place] [address] Road Bilateral 1/18/2021    BILATERAL T11 TRANSFORAMINAL EPIDURAL STEROID INJECTION WITH FLUOROSCOPY performed by Gurinder Mike MD at [facility]      TRANSESOPHAGEAL ECHOCARDIOGRAM  08/21/2018    during CABG/MVR    TUNNELED VENOUS CATHETER PLACEMENT Left 08/23/2018    Dr. Jayashree Guevara -  for HD---since removed    UPPER GASTROINTESTINAL ENDOSCOPY N/A 10/10/2018    w/biopsy performed by Estefania Mckinley MD at [address]:  Scheduled Meds:  Continuous Infusions:  PRN Meds:  Current Meds:  Current Outpatient Medications:     torsemide (DEMADEX) 20 MG tablet, Take 1 tablet by mouth daily, Disp: 90 tablet, Rfl: 1    metoclopramide (REGLAN) 5 MG tablet, Take 1 tablet by mouth 2 times daily (with meals), Disp: 60 tablet, Rfl: 3    methocarbamol (ROBAXIN) 750 MG tablet, Take 1 tablet by mouth 4 times daily as needed (back pain), Disp: 30 tablet, Rfl: 0    levothyroxine (SYNTHROID) 175 MCG tablet, Take 0.5 tablets by mouth Daily, Disp: 30 tablet, Rfl: 3    nystatin (MYCOSTATIN) 692802 UNIT/ML suspension, Take 5 mLs by mouth 4 times daily, Disp: 100 mL, Rfl: 0    insulin glargine (LANTUS SOLOSTAR) 100 UNIT/ML injection pen, Inject 16 Units into the skin every morning (Patient taking differently: Inject 10-15 Units into the skin every morning), Disp: 90 mL, Rfl: 1    midodrine (PROAMATINE) 2.5 MG tablet, Take 1 tablet by mouth 3 times daily, Disp: 90 tablet, Rfl: 2    vitamin D (ERGOCALCIFEROL) 1.25 MG (12754 UT) CAPS capsule, TAKE ONE CAPSULE BY MOUTH ONCE WEEKLY, Disp: 12 capsule, Rfl: 1    calcitRIOL (ROCALTROL) 0.25 MCG capsule, 1 tablet Monday, Wednesday and Friday (Patient taking differently: 0.25 mcg 1 tablet 6 days, skipping Tuesdays), Disp: 30 capsule, Rfl: 3    predniSONE (DELTASONE) 10 MG tablet, TAKE 1 TABLET AS NEEDED (EOSINOPHILIC GASATRITIS), Disp: 100 tablet, Rfl: 1    blood glucose test strips (FREESTYLE LITE) strip, USE TO TEST BLOOD SUGAR FOUR TIMES A DAY, Disp: 200 strip, Rfl: 3    warfarin (COUMADIN) 5 MG tablet, TAKE 1 TABLET DAILY, Disp: 100 tablet, Rfl: 3    potassium chloride (KLOR-CON M) 10 MEQ extended release tablet, TAKE 1 TABLET DAILY, Disp: 90 tablet, Rfl: 1    metoprolol succinate (TOPROL XL) 50 MG extended release tablet, TAKE 1 TABLET DAILY, Disp: 90 tablet, Rfl: 1    montelukast (SINGULAIR) 10 MG tablet, TAKE 1 TABLET NIGHTLY, Disp: 90 tablet, Rfl: 1    insulin lispro, 1 Unit Dial, (HUMALOG KWIKPEN) 100 UNIT/ML SOPN, USE SLIDING SCALE, 140-199, 2 UNITS, 200-249, 3 UNITS, 250-300, 4 UNITS, GREATER THAN 300, INJECT 5 UNITS, Disp: 15 mL, Rfl: 2    metOLazone (ZAROXOLYN) 2.5 MG tablet, TAKE 1 TABLET ON TUESDAY AND FRIDAY AND AS DIRECTED, Disp: 30 tablet, Rfl: 1    amiodarone (CORDARONE) 200 MG tablet, TAKE 1 TABLET DAILY (Patient taking differently: Take 100 mg by mouth daily), Disp: 60 tablet, Rfl: 5    albuterol sulfate  (90 Base) MCG/ACT inhaler, USE 2 INHALATIONS EVERY 6 HOURS AS NEEDED FOR WHEEZING, Disp: 25.5 g, Rfl: 2    ipratropium-albuterol (DUONEB) 0.5-2.5 (3) MG/3ML SOLN nebulizer solution, Inhale 3 mLs into the lungs every 4 hours as needed for Shortness of Breath, Disp: 120 each, Rfl: 0    FreeStyle Lancets MISC, 1 each by Does not apply route 4 times daily, Disp: 200 each, Rfl: 5    Blood Glucose Monitoring Suppl (FREESTYLE LITE) GAETANO, 1 Device by Does not apply route 4 times daily, Disp: , Rfl:     Insulin Pen Needle (BD PEN NEEDLE JANNET U/F) 32G X 4 MM  MISC, USE 1 PEN NEEDLE FOUR TIMES A DAY, Disp: 360 each, Rfl: 3    ACETAMINOPHEN PO, Take 500 mg by mouth every 6 hours as needed , Disp: , Rfl:     polyethylene glycol (GLYCOLAX) 17 GM/SCOOP powder, Take 17 g by mouth as needed, Disp: , Rfl:     omeprazole (PRILOSEC) 20 MG delayed release capsule, Take 20 mg by mouth daily, Disp: , Rfl:     ondansetron (ZOFRAN) 4 MG tablet, Take 1 tablet by mouth 3 times daily as needed for Nausea or Vomiting, Disp: 30 tablet, Rfl: 0    aspirin 81 MG chewable tablet, Take 1 tablet by mouth daily, Disp: 30 tablet, Rfl: 3    vitamin B-12 500 MCG tablet, Take 1 tablet by mouth daily, Disp: 30 tablet, Rfl: 3        ALLERGIES:  Allergies   Allergen Reactions    Awybarkt-Jsyajyu-Wusanz [Fluocinolone] Shortness Of Breath    Ciprofloxacin Shortness Of Breath    Diovan [Valsartan] Shortness Of Breath    Flagyl [Metronidazole] Shortness Of Breath     Has taken diflucan at home 12/7/15    Metformin And Related [Metformin And Related] Shortness Of Breath    Benazepril      Other reaction(s): Not Recorded    Morphine      Bad reaction. \"makes her feel horrible\".      Saxagliptin      Other reaction(s): Not Recorded    Levaquin [Levofloxacin] Rash         Social History:   Social History     Socioeconomic History    Marital status:      Spouse name: Not on file    Number of children: Not on file    Years of education: Not on file    Highest education level: Not on file   Occupational History    Not on file   Tobacco Use    Smoking status: Never    Smokeless tobacco: Never   Vaping Use    Vaping Use: Never used   Substance and Sexual Activity    Alcohol use: No    Drug use: No    Sexual activity: Not on file   Other Topics Concern    Not on file   Social History Narrative    Not on file     Social Determinants of Health     Financial Resource Strain: Not on file   Food Insecurity: Not on file   Transportation Needs: Not on file   Physical Activity: Not on file   Stress: Not on file   Social Connections: Not on file   Intimate Partner Violence: Not on file   Housing Stability: Not on file         Family History:   Cancer-related family history includes Cancer in her father. Review of Systems:  No personal history of DVT, PE. 12 point ROS otherwise negative other than reported in HPI. Physical Examination:  Patient is alert and oriented x 3 and appears well nourished and appropriate for today's visit. Height:   Ht Readings from Last 3 Encounters:   01/30/23 5' 6\" (1.676 m)   01/26/23 5' 6\" (1.676 m)   01/19/23 5' 6\" (1.676 m)     Weight:   Wt Readings from Last 3 Encounters:   01/30/23 202 lb (91.6 kg)   01/19/23 202 lb (91.6 kg)   01/17/23 202 lb (91.6 kg)     Gait: The patient walks with antalgic gait. Right Knee: no effusion             Ligaments stable to varus/valgus stress at full extension and 30 degrees. AROM Right: 5-120 deg               Positive patellofemoral crepitus             Positive lateral joint line tenderness     Left knee: no effusion             Ligaments stable to varus/valgus stress at full extension and 30 degrees. AROM: L: 5-120 Deg             Positive patellofemoral crepitus             Positive medial joint line tenderness  Hips: Preserved, pain free range of motion in both hips. Skin: Skin appears to be intact in both upper and lower extremities. There does not appear to be any ulceration or other non-healing wounds. Radiographs: Standing AP/lateral/Sunrise Knee: Imaging was reviewed with the patient. There are advanced degenerative changes of the bilateral knee with joint space narrowing, subchondral sclerosis, and osteophyte formation. There is no radiographic evidence of AVN, fracture, or dislocation. ??  Non-Operative Treatment      Assessment and Plan?: The patient has advanced degenerative changes of the BILATREAL knees    We discussed the diagnosis and treatment options in detail with the patient.   Patient may one day benefit from an elective total joint replacement however has not yet exhausted conservative treatment modalities  We have recommended non-steroidal anti-inflammatories, physical therapy, activity modification and weight loss.     She previously has had success with Synvisc injections we will put in a referral for preauthorization        Procedures    SYNVISC OR SYNVISC-ONE INJECTION (For Auth/Precert)       ?___________________________   Jaqueline Barnes MD  ?   ??cc: Jhonathan Duran MD

## 2023-01-31 ENCOUNTER — TELEPHONE (OUTPATIENT)
Dept: ORTHOPEDIC SURGERY | Age: 78
End: 2023-01-31

## 2023-01-31 NOTE — TELEPHONE ENCOUNTER
Spoke with Pt's , Author.  He agreed to an appt in the [facility] office on 2/2/23 for bilat knee injection- Synvisc One    Address was given

## 2023-02-01 ENCOUNTER — NURSE ONLY (OUTPATIENT)
Dept: CARDIOLOGY CLINIC | Age: 78
End: 2023-02-01
Payer: MEDICARE

## 2023-02-01 ENCOUNTER — TELEPHONE (OUTPATIENT)
Dept: CARDIOLOGY CLINIC | Age: 78
End: 2023-02-01

## 2023-02-01 DIAGNOSIS — I50.22 CHRONIC SYSTOLIC CHF (CONGESTIVE HEART FAILURE), NYHA CLASS 3 (HCC): ICD-10-CM

## 2023-02-01 DIAGNOSIS — I42.9 CARDIOMYOPATHY, UNSPECIFIED TYPE (HCC): Primary | ICD-10-CM

## 2023-02-01 DIAGNOSIS — Z95.810 ICD (IMPLANTABLE CARDIOVERTER-DEFIBRILLATOR) IN PLACE: ICD-10-CM

## 2023-02-01 PROCEDURE — 93297 REM INTERROG DEV EVAL ICPMS: CPT | Performed by: CLINICAL NURSE SPECIALIST

## 2023-02-01 PROCEDURE — G2066 INTER DEVC REMOTE 30D: HCPCS | Performed by: CLINICAL NURSE SPECIALIST

## 2023-02-01 NOTE — TELEPHONE ENCOUNTER
----- Message from Crystal Erwin, SOLEDAD - CNS sent at 2/1/2023  9:39 AM EST -----  Optival is off the charts  No further AF  Ask HF questions and see when she took metolazone last  We need to increase some diuretic  thanks

## 2023-02-01 NOTE — PROGRESS NOTES
Remote transmission received from patient's dual chamber ICD home monitor. Transmission shows normal sensing and pacing function. 0.9% AT/ AF burden (coumadin, Toprol, amiodarone)    Possible Optivol fluid accumulation 1/10/23 --- ongoing. Currently elevated around 200, above threshold, with correlating TI trend below reference line. TriageHF Heart Failure Risk Status on 01-Feb-2023 is High    NP will review. See interrogation under cardiology tab in the [address] [facility] [place] [address] field for more details. Will continue to monitor remotely. (End of 31-day monitoring period 2/1/23).

## 2023-02-02 ENCOUNTER — OFFICE VISIT (OUTPATIENT)
Dept: ORTHOPEDIC SURGERY | Age: 78
End: 2023-02-02
Payer: MEDICARE

## 2023-02-02 VITALS — WEIGHT: 202 LBS | RESPIRATION RATE: 16 BRPM | HEIGHT: 66 IN | BODY MASS INDEX: 32.47 KG/M2

## 2023-02-02 DIAGNOSIS — S52.591A OTHER CLOSED FRACTURE OF DISTAL END OF RIGHT RADIUS, INITIAL ENCOUNTER: Primary | ICD-10-CM

## 2023-02-02 PROCEDURE — 1123F ACP DISCUSS/DSCN MKR DOCD: CPT | Performed by: STUDENT IN AN ORGANIZED HEALTH CARE EDUCATION/TRAINING PROGRAM

## 2023-02-02 PROCEDURE — 99213 OFFICE O/P EST LOW 20 MIN: CPT | Performed by: STUDENT IN AN ORGANIZED HEALTH CARE EDUCATION/TRAINING PROGRAM

## 2023-02-02 NOTE — PROGRESS NOTES
CHIEF COMPLAINT: Right wrist pain    DATE OF INJURY: 23    History:   Ms. Aleah Dubon 69 y.o. right handed female presents today for the first visit for evaluation of a right wrist pain / injury. The patient was referred by [facility] ED. This is evaluated as a personal injury. The pain began 2 days ago. She rates pain at 9/10. There was a history of injury. She fell backwards and tried to catch herself on an outstretched hand. She presented to the ED yesterday where Xrays were taken and she was splinted and asked to follow up with orthopedics. The patient has not taken NSAIDs. The patient's occupation is retired. Interval history: The patient presents for swelling check. We are hoping to apply a short arm cast today. She rates pain 7/10. She has been using tylenol with no relief.        Past Medical History:   Diagnosis Date    Asthma     Atrial fibrillation (HCC)     Eosinophilia     Hemoptysis     HIGH CHOLESTEROL     Hx of blood clots     Hypertension     Irregular heart beat     Other specified gastritis without mention of hemorrhage     Palpitations     Skin cancer     basal and squamous    Type II or unspecified type diabetes mellitus without mention of complication, not stated as uncontrolled        Past Surgical History:   Procedure Laterality Date    BRONCHOSCOPY  2016    Dr. Az Mehta - brushings from [address] [place] [address]  2018    Dr. Tessa Solano and 5/3 2021    Cardiac cath, cardioversion and rafat     SECTION      CHOLECYSTECTOMY      COLONOSCOPY  2017    Dr. Isaac Butt - sigmoid diverticulosis, polypectomies x3    COLONOSCOPY  2014    Dr. Isaac Butt - sigmoid diverticulosis, polypectomies x3, internal hemorrhoids    COLONOSCOPY  10/10/2018    w/biopsy performed by Carlos Owusu MD at [address] [facility] N/A 2020    COLONOSCOPY DIAGNOSTIC performed by Az Mehta MD at [place] [address] BYPASS GRAFT  08/21/2018    Dr. Price Kidney - x3 (LIMA-LAD, L SV-D1-PLV) modified BL MAZE procedure w/obliteration of WAYNE using 45mm AtriClip    INSERTABLE CARDIAC MONITOR Left 08/16/2018    Dr. Grant Cameron SN# [id number] Medtronic    IR KYPHOPLASTY THORACIC FIRST LEVEL  12/8/2022    IR KYPHOPLASTY THORACIC FIRST LEVEL 12/8/2022 [facility] SPECIAL PROCEDURES    MITRAL VALVE REPLACEMENT  08/21/2018    Dr. Price Kidney - 27mm Medtronic Cinch tissue valve    PAIN MANAGEMENT PROCEDURE N/A 12/18/2020    C6-C7 MIDLINE  EPIDURAL STEROID INJECTION WITH FLUOROSCOPY performed by Williams Dinero MD at [address] [place] [address] Bilateral 1/18/2021    BILATERAL T11 TRANSFORAMINAL EPIDURAL STEROID INJECTION WITH FLUOROSCOPY performed by [facility] at [facility]      TRANSESOPHAGEAL ECHOCARDIOGRAM  08/21/2018    during CABG/MVR    TUNNELED VENOUS CATHETER PLACEMENT Left 08/23/2018    Dr. Lezlie Primrose - IJ for HD---since removed    UPPER GASTROINTESTINAL ENDOSCOPY N/A 10/10/2018    w/biopsy performed by Karen Martinez MD at [address] [place] [address]       Current Outpatient Medications on File Prior to Visit   Medication Sig Dispense Refill    torsemide (DEMADEX) 20 MG tablet Take 1 tablet by mouth daily 90 tablet 1    metoclopramide (REGLAN) 5 MG tablet Take 1 tablet by mouth 2 times daily (with meals) 60 tablet 3    methocarbamol (ROBAXIN) 750 MG tablet Take 1 tablet by mouth 4 times daily as needed (back pain) 30 tablet 0    levothyroxine (SYNTHROID) 175 MCG tablet Take 0.5 tablets by mouth Daily 30 tablet 3    nystatin (MYCOSTATIN) 070078 UNIT/ML suspension Take 5 mLs by mouth 4 times daily 100 mL 0    insulin glargine (LANTUS SOLOSTAR) 100 UNIT/ML injection pen Inject 16 Units into the skin every morning (Patient taking differently: Inject 10-15 Units into the skin every morning) 90 mL 1    midodrine (PROAMATINE) 2.5 MG tablet Take 1 tablet by mouth 3 times daily 90 tablet 2    vitamin D (ERGOCALCIFEROL) 1.25 MG (12238 UT) CAPS capsule TAKE ONE CAPSULE BY MOUTH ONCE WEEKLY 12 capsule 1    calcitRIOL (ROCALTROL) 0.25 MCG capsule 1 tablet Monday, Wednesday and Friday (Patient taking differently: 0.25 mcg 1 tablet 6 days, skipping Tuesdays) 30 capsule 3    predniSONE (DELTASONE) 10 MG tablet TAKE 1 TABLET AS NEEDED (EOSINOPHILIC GASATRITIS) 920 tablet 1    blood glucose test strips (FREESTYLE LITE) strip USE TO TEST BLOOD SUGAR FOUR TIMES A  strip 3    warfarin (COUMADIN) 5 MG tablet TAKE 1 TABLET DAILY 100 tablet 3    potassium chloride (KLOR-CON M) 10 MEQ extended release tablet TAKE 1 TABLET DAILY 90 tablet 1    metoprolol succinate (TOPROL XL) 50 MG extended release tablet TAKE 1 TABLET DAILY 90 tablet 1    montelukast (SINGULAIR) 10 MG tablet TAKE 1 TABLET NIGHTLY 90 tablet 1    insulin lispro, 1 Unit Dial, (HUMALOG KWIKPEN) 100 UNIT/ML SOPN USE SLIDING SCALE, 140-199, 2 UNITS, 200-249, 3 UNITS, 250-300, 4 UNITS, GREATER THAN 300, INJECT 5 UNITS 15 mL 2    metOLazone (ZAROXOLYN) 2.5 MG tablet TAKE 1 TABLET ON TUESDAY AND FRIDAY AND AS DIRECTED 30 tablet 1    amiodarone (CORDARONE) 200 MG tablet TAKE 1 TABLET DAILY (Patient taking differently: Take 100 mg by mouth daily) 60 tablet 5    albuterol sulfate  (90 Base) MCG/ACT inhaler USE 2 INHALATIONS EVERY 6 HOURS AS NEEDED FOR WHEEZING 25.5 g 2    ipratropium-albuterol (DUONEB) 0.5-2.5 (3) MG/3ML SOLN nebulizer solution Inhale 3 mLs into the lungs every 4 hours as needed for Shortness of Breath 120 each 0    FreeStyle Lancets MISC 1 each by Does not apply route 4 times daily 200 each 5    Blood Glucose Monitoring Suppl (FREESTYLE LITE) GAETANO 1 Device by Does not apply route 4 times daily      Insulin Pen Needle (BD PEN NEEDLE JANNET U/F) 32G X 4 MM MISC USE 1 PEN NEEDLE FOUR TIMES A  each 3    ACETAMINOPHEN PO Take 500 mg by mouth every 6 hours as needed       polyethylene glycol (GLYCOLAX) 17 GM/SCOOP powder Take 17 g by mouth as needed      omeprazole (PRILOSEC) 20 MG delayed release capsule Take 20 mg by mouth daily      ondansetron (ZOFRAN) 4 MG tablet Take 1 tablet by mouth 3 times daily as needed for Nausea or Vomiting 30 tablet 0    aspirin 81 MG chewable tablet Take 1 tablet by mouth daily 30 tablet 3    vitamin B-12 500 MCG tablet Take 1 tablet by mouth daily 30 tablet 3     No current facility-administered medications on file prior to visit. Allergies   Allergen Reactions    Qlaesfrq-Fxirasn-Btsgsq [Fluocinolone] Shortness Of Breath    Ciprofloxacin Shortness Of Breath    Diovan [Valsartan] Shortness Of Breath    Flagyl [Metronidazole] Shortness Of Breath     Has taken diflucan at home 12/7/15    Metformin And Related [Metformin And Related] Shortness Of Breath    Benazepril      Other reaction(s): Not Recorded    Morphine      Bad reaction. \"makes her feel horrible\".      Saxagliptin      Other reaction(s): Not Recorded    Levaquin [Levofloxacin] Rash       Social History     Socioeconomic History    Marital status:      Spouse name: Not on file    Number of children: Not on file    Years of education: Not on file    Highest education level: Not on file   Occupational History    Not on file   Tobacco Use    Smoking status: Never    Smokeless tobacco: Never   Vaping Use    Vaping Use: Never used   Substance and Sexual Activity    Alcohol use: No    Drug use: No    Sexual activity: Not on file   Other Topics Concern    Not on file   Social History Narrative    Not on file     Social Determinants of Health     Financial Resource Strain: Not on file   Food Insecurity: Not on file   Transportation Needs: Not on file   Physical Activity: Not on file   Stress: Not on file   Social Connections: Not on file   Intimate Partner Violence: Not on file   Housing Stability: Not on file       Family History   Problem Relation Age of Onset    Cancer Father     Asthma Mother     Hypertension Mother     Heart Disease Mother     High Blood Pressure Mother Review of Systems:  I have reviewed the clinically relevant past medical history, medications, allergies, family history, social history, and 13 point Review of Systems from the patient's recent history form & documented any details relevant to today's presenting complaints in the history above. The patient's self-reported past medical history, medications, allergies, family history, social history, and Review of Systems form from 1/19/23 have been scanned into the chart under the \"Media\" tab. Physical Examination:     Ms. Vivek Bedolla is a pleasant 69 y.o. female who presents today in no acute distress, awake, alert, and oriented. Vitals:    02/02/23 1058   Resp: 16         There is swelling that can be seen, no change in the color. She has intact sensation to light touch throughout the bilateral hands in the median, radial, and ulnar nerve distribution and good radial pulses. EPL/FPL/Interossei are intact bilaterally. She is exquisitely tender at the distal radius. No ulnar styloid tenderness or carpal tenderness. Her range of motion is limited secondary to pain. Strength limited secondary to pain. There is still moderate to severe edema of the hand and fingers. IMAGING:  Right wrist Xray's:  3 views taken at the ED demonstrating   There is diffuse osteopenia. There may be a subtle fracture of the distal   radial metaphysis. There is diffuse degenerative joint disease. There is   chondrocalcinosis involving the triangular fibrocartilage. There are   vascular calcifications. There is soft tissue swelling. Agree with radiologist read. Assessment:     Right wrist  distal radius fracture  Afib  Hx of blood clots     Plan:     Natural history and expected course discussed. Questions answered. Short arm cast applied. She will contact her PCP for refill of her pain medication. Tylenol as needed. Maintain non weight bearing right upper extremity. Follow up in 4 weeks.  We will repeat Xrays out of the cast.                 Ahsan Ortiz PA-C  Board Certified by the [employer] on Certification of [address]: This note was generated with use of a verbal recognition program and an attempt was made to check for errors. It is possible that there are still dictated errors within this office note. If so, please bring any significant errors to my attention for an addendum. All efforts were made to ensure that this office note is accurate.

## 2023-02-02 NOTE — TELEPHONE ENCOUNTER
Spoke to patient and spouse she is swelling in legs and feet. She has red spots from ankles to her knees bight red rash painful to the touch and warm, has not been to PCP yet has an appointment next week, watching salt and fluid intake, no sob more than normal, no fast food, Took metolazone Tuesday this week, taking torsemide 20 mg daily. Broke rt wrist and its swollen may get a cast today.  Will get shots in her knees today

## 2023-02-02 NOTE — TELEPHONE ENCOUNTER
Tried to reach patient she had a few appointments today will try this afternoon. Spoke to patient and spouse they will try the 3 doses of torsemide and then call our office about the IV Lasix.

## 2023-02-02 NOTE — TELEPHONE ENCOUNTER
Has she been taking ibuprofen, advil motrin  She should take an extra 20 mg of torsemide for the next 3 days  Does Мария Bautista think should could come for a dose of IV lasix

## 2023-02-03 ENCOUNTER — TELEPHONE (OUTPATIENT)
Dept: FAMILY MEDICINE CLINIC | Age: 78
End: 2023-02-03

## 2023-02-03 ENCOUNTER — NURSE ONLY (OUTPATIENT)
Dept: ORTHOPEDIC SURGERY | Age: 78
End: 2023-02-03
Payer: MEDICARE

## 2023-02-03 VITALS — RESPIRATION RATE: 16 BRPM | HEIGHT: 66 IN | BODY MASS INDEX: 32.47 KG/M2 | WEIGHT: 202 LBS

## 2023-02-03 DIAGNOSIS — M17.0 BILATERAL PRIMARY OSTEOARTHRITIS OF KNEE: Primary | ICD-10-CM

## 2023-02-03 PROCEDURE — 20610 DRAIN/INJ JOINT/BURSA W/O US: CPT | Performed by: STUDENT IN AN ORGANIZED HEALTH CARE EDUCATION/TRAINING PROGRAM

## 2023-02-03 PROCEDURE — 99999 PR OFFICE/OUTPT VISIT,PROCEDURE ONLY: CPT | Performed by: STUDENT IN AN ORGANIZED HEALTH CARE EDUCATION/TRAINING PROGRAM

## 2023-02-03 NOTE — TELEPHONE ENCOUNTER
Any new medications? If not take anything new that I would recommend that she use her prednisone medication taking 10 mg twice daily for 5 days.

## 2023-02-03 NOTE — PROGRESS NOTES
PROCEDURE NOTE:    PRE-PROCEDURE DIAGNOSIS: Bilateral knee osteoarthritis     POST-PROCEDURE DIAGNOSIS: Bilateral knee osteoarthritis       PROCEDURE:  The risks and benefits of an injection were discussed with the patient. The patient had full opportunity to ask questions and all were answered. The patient then provided verbal informed consent. The skin was then prepped with betadine solution and alcohol. Under aseptic conditions, the  bilateral knees were injected with a prefilled syringe of Synvisc one. There were no immediate complications following the injection. The patient was advised of the possibility of injection site reaction and instructed to apply ice to the area and take NSAIDs if able. POST-PROCEDURE INSTRUCTIONS GIVEN TO PATIENT: The patient was advised to ice the knee for 15-20 minutes to relieve any injection site related pain. FOLLOW-UP: as directed.             Gordo Interiano PA-C  Board Certified by the [employer] on Certification of [address] and [employer]

## 2023-02-03 NOTE — TELEPHONE ENCOUNTER
Patient's  calling to advise that wife has broke out in a red rash on both legs affecting thighs and ankles as well. States feet feel sore with it. They would like to know if something can called in for it. Is possible they would like it sent to [facility] on [address].     Please advise

## 2023-02-06 ENCOUNTER — CARE COORDINATION (OUTPATIENT)
Dept: CASE MANAGEMENT | Age: 78
End: 2023-02-06

## 2023-02-06 NOTE — CARE COORDINATION
[facility] Care Transitions Follow Up Call    Care Transition Nurse contacted the patient by telephone to follow up after admission . Verified name and  with patient as identifiers. Patient: La Howe  Patient : 1945   MRN: [id number]  Reason for Admission:   Discharge Date: 23 RARS: Readmission Risk Score: 20.3      Needs to be reviewed by the provider   Additional needs identified to be addressed with provider: No  none             Method of communication with provider: none. Pt states doing pretty well, no new issues or concerns. Rash is improving on both legs. Received shot to both knees last week, starting to feel better. F/u appts listed below. Agreed to more CTC f/u calls. Addressed changes since last contact:  none  Discussed follow-up appointments. If no appointment was previously scheduled, appointment scheduling offered: No.   Is follow up appointment scheduled within 7 days of discharge?  No.    Follow Up  Future Appointments   Date Time Provider Tawana Young   2023  1:30 PM Sharyle Nipple, APRN - CNS [facility]   2023 11:00 AM Stella Rizo MD [facility]   3/2/2023  8:15 AM MUNDO Brenner FF [facility]   3/6/2023 11:00 AM SCHEDULE, SHERYL OPTIVOL CHECK FF [facility]   3/14/2023  8:30 AM SOLEDAD Covarrubias - CNS [facility]   3/14/2023  9:00 AM SCHEDULE, SHERYL DEVICE CHECK [facility]   4/10/2023 11:15 AM SCHEDULE, SHERYL OPTIVOL CHECK FF [facility]   2023  7:45 AM SCHEDULE, SHERYL OPTIVOL CHECK FF [facility]   2023 10:30 AM SCHEDULE, SHERYL OPTIVOL CHECK FF [facility]   2023  8:45 AM Archie Mclaughlin MD FF [facility]   2023  8:00 AM SCHEDULE, SHERYL OPTIVOL CHECK FF [facility]   2023  7:30 AM SCHEDULE, SHERYL OPTIVOL CHECK FF Cardio [facility]   10/23/2023  7:30 AM SCHEDULE, [place] OPTIVOL CHECK FF Cardio [facility]   2023  7:30 AM SCHEDULE, [place] OPTIVOL CHECK FF Cardio [facility]     Non-[facility] follow up appointment(s):     Care Transition Nurse reviewed medical action plan with patient and discussed any barriers to care and/or understanding of plan of care after discharge. Discussed appropriate site of care based on symptoms and resources available to patient including: When to call 911. The patient agrees to contact the PCP office for questions related to their healthcare. Advance Care Planning:   not on file; education provided. Patients top risk factors for readmission: medical condition-  N/V/D; weakness; small bowel pneumatosis  Interventions to address risk factors: Assessment and support for treatment adherence and medication management-  N/V/D; weakness; small bowel pneumatosis    Offered patient enrollment in the Remote Patient Monitoring (RPM) program for in-home monitoring: Yes, but did not enroll at this time. Care Transitions Subsequent and Final Call    Subsequent and Final Calls  Do you have any ongoing symptoms?: No  Have your medications changed?: No  Do you have any questions related to your medications?: No  Do you currently have any active services?: No  Are you currently active with any services?: Home Health  Do you have any needs or concerns that I can assist you with?: No  Identified Barriers: None  Care Transitions Interventions  No Identified Needs  Other Interventions:             Care Transition Nurse provided contact information for future needs. Plan for follow-up call in 5-7 days based on severity of symptoms and risk factors.   Plan for next call: self management-  N/V/D; weakness; small bowel pneumatosis,f/u appts, discuss RPM    Shirin Welch RN

## 2023-02-07 ENCOUNTER — HOSPITAL ENCOUNTER (EMERGENCY)
Age: 78
Discharge: HOME | End: 2023-02-07
Attending: EMERGENCY MEDICINE
Payer: MEDICARE

## 2023-02-07 ENCOUNTER — APPOINTMENT (OUTPATIENT)
Dept: GENERAL RADIOLOGY | Age: 78
End: 2023-02-07
Payer: MEDICARE

## 2023-02-07 ENCOUNTER — TELEPHONE (OUTPATIENT)
Dept: OTHER | Facility: CLINIC | Age: 78
End: 2023-02-07

## 2023-02-07 VITALS
WEIGHT: 203 LBS | RESPIRATION RATE: 16 BRPM | BODY MASS INDEX: 32.62 KG/M2 | TEMPERATURE: 98.2 F | HEIGHT: 66 IN | SYSTOLIC BLOOD PRESSURE: 155 MMHG | HEART RATE: 80 BPM | DIASTOLIC BLOOD PRESSURE: 80 MMHG | OXYGEN SATURATION: 94 %

## 2023-02-07 DIAGNOSIS — Z47.89 TIGHT CAST: Primary | ICD-10-CM

## 2023-02-07 DIAGNOSIS — S52.501D CLOSED FRACTURE OF DISTAL END OF RIGHT RADIUS WITH ROUTINE HEALING, UNSPECIFIED FRACTURE MORPHOLOGY, SUBSEQUENT ENCOUNTER: ICD-10-CM

## 2023-02-07 PROCEDURE — 73110 X-RAY EXAM OF WRIST: CPT

## 2023-02-07 PROCEDURE — 99283 EMERGENCY DEPT VISIT LOW MDM: CPT

## 2023-02-07 NOTE — ED NOTES
Patient discharged with discharge instructions and medications reviewed. Patient verbalized understanding of instructions and follow up.        Angela Nielson RN  02/07/23 [id number]

## 2023-02-07 NOTE — ED PROVIDER NOTES
[facility]) Emergency [address] [facility]    Mariela Guevara MD, am the primary clinician of record. CHIEF COMPLAINT  Chief Complaint   Patient presents with    Swelling     Patient broke her right arm and had a cast put on last Thursday. Patient states she started having swelling in her hand yesterday morning. Patient also complains of increased pain and tingling. HISTORY OF PRESENT ILLNESS  Carlos Snow is a 69 y.o. female  who presents to the ED complaining of right arm tingling and discomfort particularly in the hand and underneath her cast.    Her cast was placed on 23 by Sheryl FLOWER with orthopedics. This is because of a fall on 23 where she landed on her outstretched R wrist with XR showing subtle distal radial metaphysis. No reinjury. She was placed in a Velcro splint initially and then subsequently another splint and now a cast.  She denies any discoloration to her right hand although has some tingling in the fingers. On Coumadin for afib hx. No other complaints, modifying factors or associated symptoms. I have reviewed the following from the nursing documentation.     Past Medical History:   Diagnosis Date    Asthma     Atrial fibrillation (HCC)     Eosinophilia     Hemoptysis     HIGH CHOLESTEROL     Hx of blood clots     Hypertension     Irregular heart beat     Other specified gastritis without mention of hemorrhage     Palpitations     Skin cancer     basal and squamous    Type II or unspecified type diabetes mellitus without mention of complication, not stated as uncontrolled      Past Surgical History:   Procedure Laterality Date    BRONCHOSCOPY  2016    Dr. Vannesa Valle - brushings from [facility]  2018    Dr. Nasrin Roy and 5/3 2021    Cardiac cath, cardioversion and rafat     SECTION      CHOLECYSTECTOMY      COLONOSCOPY  2017    Dr. Rajat Peng - sigmoid diverticulosis, polypectomies x3    COLONOSCOPY  01/17/2014    Dr. Salinas Krause - sigmoid diverticulosis, polypectomies x3, internal hemorrhoids    COLONOSCOPY  10/10/2018    w/biopsy performed by Luis Angel MD at [address] [place] [address]    COLONOSCOPY N/A 8/7/2020    COLONOSCOPY DIAGNOSTIC performed by Katie Gonzalez MD at [facility]  08/21/2018    Dr. Marylou Barrios - x3 (LIMA-LAD, L SV-D1-PLV) modified BL MAZE procedure w/obliteration of WAYNE using 45mm AtriClip    INSERTABLE CARDIAC MONITOR Left 08/16/2018    Dr. Zuleyma Dalton - Danae Umaña SN# [id number] [employer]    IR KYPHOPLASTY THORACIC FIRST LEVEL  12/8/2022    IR KYPHOPLASTY THORACIC FIRST LEVEL 12/8/2022 [facility] SPECIAL PROCEDURES    MITRAL VALVE REPLACEMENT  08/21/2018    Dr. Marylou Barrios - 27mm Medtronic Cinch tissue valve    PAIN MANAGEMENT PROCEDURE N/A 12/18/2020    C6-C7 MIDLINE  EPIDURAL STEROID INJECTION WITH FLUOROSCOPY performed by Cassie Ruiz MD at [address] [place] [address] Bilateral 1/18/2021    BILATERAL T11 TRANSFORAMINAL EPIDURAL STEROID INJECTION WITH FLUOROSCOPY performed by Cassie Ruiz MD at [facility]      TRANSESOPHAGEAL ECHOCARDIOGRAM  08/21/2018    during CABG/MVR    TUNNELED VENOUS CATHETER PLACEMENT Left 08/23/2018    Dr. Pipe Almanza -  for HD---since removed    UPPER GASTROINTESTINAL ENDOSCOPY N/A 10/10/2018    w/biopsy performed by Luis Angel MD at [address] [place] [address]     Family History   Problem Relation Age of Onset    Cancer Father     Asthma Mother     Hypertension Mother     Heart Disease Mother     High Blood Pressure Mother      Social History     Socioeconomic History    Marital status:      Spouse name: Not on file    Number of children: Not on file    Years of education: Not on file    Highest education level: Not on file   Occupational History    Not on file   Tobacco Use    Smoking status: Never    Smokeless tobacco: Never   Vaping Use    Vaping Use: Never used   Substance and Sexual Activity    Alcohol use: No    Drug use: No    Sexual activity: Not on file   Other Topics Concern    Not on file   Social History Narrative    Not on file     Social Determinants of Health     Financial Resource Strain: Not on file   Food Insecurity: Not on file   Transportation Needs: Not on file   Physical Activity: Not on file   Stress: Not on file   Social Connections: Not on file   Intimate Partner Violence: Not on file   Housing Stability: Not on file     No current facility-administered medications for this encounter.      Current Outpatient Medications   Medication Sig Dispense Refill    torsemide (DEMADEX) 20 MG tablet Take 1 tablet by mouth daily 90 tablet 1    metoclopramide (REGLAN) 5 MG tablet Take 1 tablet by mouth 2 times daily (with meals) 60 tablet 3    methocarbamol (ROBAXIN) 750 MG tablet Take 1 tablet by mouth 4 times daily as needed (back pain) 30 tablet 0    levothyroxine (SYNTHROID) 175 MCG tablet Take 0.5 tablets by mouth Daily 30 tablet 3    nystatin (MYCOSTATIN) 248174 UNIT/ML suspension Take 5 mLs by mouth 4 times daily 100 mL 0    insulin glargine (LANTUS SOLOSTAR) 100 UNIT/ML injection pen Inject 16 Units into the skin every morning (Patient taking differently: Inject 10-15 Units into the skin every morning) 90 mL 1    midodrine (PROAMATINE) 2.5 MG tablet Take 1 tablet by mouth 3 times daily 90 tablet 2    vitamin D (ERGOCALCIFEROL) 1.25 MG (29838 UT) CAPS capsule TAKE ONE CAPSULE BY MOUTH ONCE WEEKLY 12 capsule 1    calcitRIOL (ROCALTROL) 0.25 MCG capsule 1 tablet Monday, Wednesday and Friday (Patient taking differently: 0.25 mcg 1 tablet 6 days, skipping Tuesdays) 30 capsule 3    predniSONE (DELTASONE) 10 MG tablet TAKE 1 TABLET AS NEEDED (EOSINOPHILIC GASATRITIS) 834 tablet 1    blood glucose test strips (FREESTYLE LITE) strip USE TO TEST BLOOD SUGAR FOUR TIMES A  strip 3    warfarin (COUMADIN) 5 MG tablet TAKE 1 TABLET DAILY 100 tablet 3    potassium chloride (KLOR-CON M) 10 MEQ extended release tablet TAKE 1 TABLET DAILY 90 tablet 1    metoprolol succinate (TOPROL XL) 50 MG extended release tablet TAKE 1 TABLET DAILY 90 tablet 1    montelukast (SINGULAIR) 10 MG tablet TAKE 1 TABLET NIGHTLY 90 tablet 1    insulin lispro, 1 Unit Dial, (HUMALOG KWIKPEN) 100 UNIT/ML SOPN USE SLIDING SCALE, 140-199, 2 UNITS, 200-249, 3 UNITS, 250-300, 4 UNITS, GREATER THAN 300, INJECT 5 UNITS 15 mL 2    metOLazone (ZAROXOLYN) 2.5 MG tablet TAKE 1 TABLET ON TUESDAY AND FRIDAY AND AS DIRECTED 30 tablet 1    amiodarone (CORDARONE) 200 MG tablet TAKE 1 TABLET DAILY (Patient taking differently: Take 100 mg by mouth daily) 60 tablet 5    albuterol sulfate  (90 Base) MCG/ACT inhaler USE 2 INHALATIONS EVERY 6 HOURS AS NEEDED FOR WHEEZING 25.5 g 2    ipratropium-albuterol (DUONEB) 0.5-2.5 (3) MG/3ML SOLN nebulizer solution Inhale 3 mLs into the lungs every 4 hours as needed for Shortness of Breath 120 each 0    FreeStyle Lancets MISC 1 each by Does not apply route 4 times daily 200 each 5    Blood Glucose Monitoring Suppl (FREESTYLE LITE) GAETANO 1 Device by Does not apply route 4 times daily      Insulin Pen Needle (BD PEN NEEDLE JANNET U/F) 32G X 4 MM MISC USE 1 PEN NEEDLE FOUR TIMES A  each 3    ACETAMINOPHEN PO Take 500 mg by mouth every 6 hours as needed       polyethylene glycol (GLYCOLAX) 17 GM/SCOOP powder Take 17 g by mouth as needed      omeprazole (PRILOSEC) 20 MG delayed release capsule Take 20 mg by mouth daily      ondansetron (ZOFRAN) 4 MG tablet Take 1 tablet by mouth 3 times daily as needed for Nausea or Vomiting 30 tablet 0    aspirin 81 MG chewable tablet Take 1 tablet by mouth daily 30 tablet 3    vitamin B-12 500 MCG tablet Take 1 tablet by mouth daily 30 tablet 3     Allergies   Allergen Reactions    Mnkqsyxz-Xxlqkjs-Ahmezd [Fluocinolone] Shortness Of Breath    Ciprofloxacin Shortness Of Breath    Diovan [Valsartan] Shortness Of Breath    Flagyl [Metronidazole] Shortness Of Breath Has taken diflucan at home 12/7/15    Metformin And Related [Metformin And Related] Shortness Of Breath    Benazepril      Other reaction(s): Not Recorded    Morphine      Bad reaction. \"makes her feel horrible\". Saxagliptin      Other reaction(s): Not Recorded    Levaquin [Levofloxacin] Rash       REVIEW OF SYSTEMS  10 systems reviewed, pertinent positives per HPI otherwise noted to be negative. PHYSICAL EXAM  BP (!) 155/80   Pulse 80   Temp 98.2 °F (36.8 °C) (Oral)   Resp 16   Ht 5' 6\" (1.676 m)   Wt 203 lb (92.1 kg)   SpO2 94%   BMI 32.77 kg/m²    GENERAL APPEARANCE: Awake and alert. Cooperative. No distress. HENT: Normocephalic. Atraumatic. Mucous membranes are dry. NECK: Supple. EYES: PERRL. EOM's grossly intact. HEART/CHEST: RRR. No murmurs. No chest wall tenderness. LUNGS: Respirations unlabored. CTAB. Good air exchange. Speaking comfortably in full sentences. ABDOMEN: No tenderness. Soft. Non-distended. No masses. No organomegaly. No guarding or rebound. Normal bowel sounds throughout. MUSCULOSKELETAL:  RUE: Initially cast is in place, hand does have brisk cap refill in all digits with only mild swelling and no discoloration or pallor, however she does have tenderness to the fingers and hand that is exposed. There is no tenderness of the proximal forearm just proximal to the cast, or to the elbow or upper arm. After the cast was removed, pain improved, and 2+ radial pulse. Brisk cap refill x5 digits. Sensation and motor function fully intact in the radial, ulnar, and median nerve distribution. Tenderness only over healing fracture site. Full range of motion of all major joints. Cardinal movements of hand fully intact. No other erythema, bruising, or lacerations. Comparments are soft. LUE:  No tenderness. 2+ radial pulse. Brisk cap refill x5 digits. Sensation and motor function fully intact in the radial, ulnar, and median nerve distribution.   Full range of motion of all major joints. Cardinal movements of hand fully intact. No erythema, bruising, or lacerations. Comparments are soft. SKIN: Warm and dry. No acute rashes. NEUROLOGICAL: Alert and oriented. CN's 2-12 intact. No gross facial drooping. Strength 5/5, sensation intact. 2 plus DTR's in knees bilaterally. Gait normal.  PSYCHIATRIC: Normal mood and affect. EKG performed in ED:  None    RADIOLOGY  Any applicable radiology studies including x-ray, CT, MRI, and/or ultrasound, were reviewed independently by me in addition to the radiologist.  I reviewed all radiology images and reports as well from this evaluation. XR WRIST RIGHT (MIN 3 VIEWS)    Result Date: 2/7/2023  1. Unchanged subacute distal radial fracture. ED COURSE/MDM  Patient seen and evaluated. Old records reviewed. Labs and imaging reviewed. After initial evaluation, differential diagnostic considerations included but not limited to: abrasion/laceration, contusion, fracture, sprain/strain, dislocation    The patient's ED workup was notable for cast feeling too tight to subjectively on the right upper extremity from a fracture that happened on January 18. This has been managed nonoperatively. Before the cast was removed, the patient did not have any significant evidence of distal ischemia but rather just discomfort noted by the patient with some tingling. The cast was removed uneventfully with a cast saw. Subsequent exam is benign and unremarkable and the patient is feeling better. Given the remote nature of the injury without reinjury, orthopedics was consulted as below. A sling was provided for comfort. Consults:  Dr. Hemalatha Rhodes from orthopedics was consulted about the patient's ED history, physical, workup, and course so far. Recommendations from this consultant included repeat the x-ray to ensure stability of the fracture.   I did obtain an x-ray of the right wrist and this was normal expected subacute distal radius fracture healing pattern. The patient has improvement of symptoms after the cast was removed but never really had signs of significant ischemic change. In consultation with orthopedics he recommended reapplying the patient's previous Velcro splint which we did in the ED and can follow-up later this week in the office. History obtained from: Patient, Family (granddaughter), and Significant other    Pertinent social determinants of health: None applicable    Chronic conditions potentially affecting care:  afib on a/c    Review of other records: Outpatient notes from specialist (orthopedics) from 2/2/23, ED visit here from 1/18/23    Reassessment:  Not applicable (no medications administered)    CLINICAL IMPRESSION  1. Tight cast    2. Closed fracture of distal end of right radius with routine healing, unspecified fracture morphology, subsequent encounter        Blood pressure (!) 155/80, pulse 80, temperature 98.2 °F (36.8 °C), temperature source Oral, resp. rate 16, height 5' 6\" (1.676 m), weight 203 lb (92.1 kg), SpO2 94 %, not currently breastfeeding. DISPOSITION  Blanco Morse was discharged in stable condition. I have discussed the findings of today's workup with the patient and addressed the patient's questions and concerns. Important warning signs as well as new or worsening symptoms which would necessitate immediate return to the ED were discussed. The plan is to discharge from the ED at this time, and the patient is in stable condition. The patient acknowledged understanding is agreeable with this plan. Follow-up with:   Lindsay Castillo MD  [address] [id number] [facility] [facility]    Schedule an appointment as soon as possible for a visit in 1 week  For symptom re-evaluation    [facility] Emergency Department  [address]  Go to   If symptoms worsen    Critical care time:  None    DISCLAIMER: This chart was created using Dragon dictation software. Efforts were made by me to ensure accuracy, however some errors may be present due to limitations of this technology and occasionally words are not transcribed correctly.         Good Turk MD  02/07/23 [id number]

## 2023-02-08 ENCOUNTER — HOSPITAL ENCOUNTER (OUTPATIENT)
Dept: ONCOLOGY | Age: 78
Setting detail: INFUSION SERIES
Discharge: HOME | End: 2023-02-08
Payer: MEDICARE

## 2023-02-08 ENCOUNTER — OFFICE VISIT (OUTPATIENT)
Dept: CARDIOLOGY CLINIC | Age: 78
End: 2023-02-08
Payer: MEDICARE

## 2023-02-08 VITALS — RESPIRATION RATE: 16 BRPM | DIASTOLIC BLOOD PRESSURE: 62 MMHG | HEART RATE: 78 BPM | SYSTOLIC BLOOD PRESSURE: 116 MMHG

## 2023-02-08 VITALS
WEIGHT: 205.2 LBS | HEART RATE: 83 BPM | HEIGHT: 66 IN | BODY MASS INDEX: 32.98 KG/M2 | OXYGEN SATURATION: 95 % | SYSTOLIC BLOOD PRESSURE: 106 MMHG | DIASTOLIC BLOOD PRESSURE: 60 MMHG

## 2023-02-08 DIAGNOSIS — E55.9 VITAMIN D DEFICIENCY: ICD-10-CM

## 2023-02-08 DIAGNOSIS — I95.9 HYPOTENSION, UNSPECIFIED HYPOTENSION TYPE: ICD-10-CM

## 2023-02-08 DIAGNOSIS — I50.22 CHRONIC SYSTOLIC CHF (CONGESTIVE HEART FAILURE), NYHA CLASS 3 (HCC): Primary | ICD-10-CM

## 2023-02-08 DIAGNOSIS — D64.9 ANEMIA, UNSPECIFIED TYPE: ICD-10-CM

## 2023-02-08 DIAGNOSIS — N18.30 STAGE 3 CHRONIC KIDNEY DISEASE, UNSPECIFIED WHETHER STAGE 3A OR 3B CKD (HCC): ICD-10-CM

## 2023-02-08 DIAGNOSIS — I48.0 PAF (PAROXYSMAL ATRIAL FIBRILLATION) (HCC): ICD-10-CM

## 2023-02-08 DIAGNOSIS — Z95.810 ICD (IMPLANTABLE CARDIOVERTER-DEFIBRILLATOR), DUAL, IN SITU: ICD-10-CM

## 2023-02-08 DIAGNOSIS — M17.0 PRIMARY OSTEOARTHRITIS OF BOTH KNEES: ICD-10-CM

## 2023-02-08 DIAGNOSIS — Z95.1 S/P CABG X 3: ICD-10-CM

## 2023-02-08 DIAGNOSIS — I50.22 CHRONIC SYSTOLIC CHF (CONGESTIVE HEART FAILURE), NYHA CLASS 3 (HCC): ICD-10-CM

## 2023-02-08 DIAGNOSIS — I35.0 NONRHEUMATIC AORTIC VALVE STENOSIS: ICD-10-CM

## 2023-02-08 LAB
ANION GAP SERPL CALCULATED.3IONS-SCNC: 11 MMOL/L (ref 3–16)
BUN BLDV-MCNC: 29 MG/DL (ref 7–20)
CALCIUM SERPL-MCNC: 8.7 MG/DL (ref 8.3–10.6)
CHLORIDE BLD-SCNC: 100 MMOL/L (ref 99–110)
CO2: 29 MMOL/L (ref 21–32)
CREAT SERPL-MCNC: 1.3 MG/DL (ref 0.6–1.2)
GFR SERPL CREATININE-BSD FRML MDRD: 43 ML/MIN/{1.73_M2}
GLUCOSE BLD-MCNC: 111 MG/DL (ref 70–99)
HCT VFR BLD CALC: 33.3 % (ref 36–48)
HEMOGLOBIN: 10.5 G/DL (ref 12–16)
MCH RBC QN AUTO: 29.5 PG (ref 26–34)
MCHC RBC AUTO-ENTMCNC: 31.6 G/DL (ref 31–36)
MCV RBC AUTO: 93.6 FL (ref 80–100)
PDW BLD-RTO: 16.5 % (ref 12.4–15.4)
PLATELET # BLD: 238 K/UL (ref 135–450)
PMV BLD AUTO: 8.4 FL (ref 5–10.5)
POTASSIUM SERPL-SCNC: 3.1 MMOL/L (ref 3.5–5.1)
PRO-BNP: ABNORMAL PG/ML (ref 0–449)
RBC # BLD: 3.56 M/UL (ref 4–5.2)
SODIUM BLD-SCNC: 140 MMOL/L (ref 136–145)
VITAMIN D 25-HYDROXY: 68.2 NG/ML
WBC # BLD: 5.7 K/UL (ref 4–11)

## 2023-02-08 PROCEDURE — 99211 OFF/OP EST MAY X REQ PHY/QHP: CPT

## 2023-02-08 PROCEDURE — 83540 ASSAY OF IRON: CPT

## 2023-02-08 PROCEDURE — 83550 IRON BINDING TEST: CPT

## 2023-02-08 PROCEDURE — 1123F ACP DISCUSS/DSCN MKR DOCD: CPT | Performed by: CLINICAL NURSE SPECIALIST

## 2023-02-08 PROCEDURE — 99215 OFFICE O/P EST HI 40 MIN: CPT | Performed by: CLINICAL NURSE SPECIALIST

## 2023-02-08 PROCEDURE — 3074F SYST BP LT 130 MM HG: CPT | Performed by: CLINICAL NURSE SPECIALIST

## 2023-02-08 PROCEDURE — 85027 COMPLETE CBC AUTOMATED: CPT

## 2023-02-08 PROCEDURE — 96374 THER/PROPH/DIAG INJ IV PUSH: CPT

## 2023-02-08 PROCEDURE — 6360000002 HC RX W HCPCS: Performed by: CLINICAL NURSE SPECIALIST

## 2023-02-08 PROCEDURE — 83880 ASSAY OF NATRIURETIC PEPTIDE: CPT

## 2023-02-08 PROCEDURE — 80048 BASIC METABOLIC PNL TOTAL CA: CPT

## 2023-02-08 PROCEDURE — 3078F DIAST BP <80 MM HG: CPT | Performed by: CLINICAL NURSE SPECIALIST

## 2023-02-08 PROCEDURE — 82728 ASSAY OF FERRITIN: CPT

## 2023-02-08 PROCEDURE — 82306 VITAMIN D 25 HYDROXY: CPT

## 2023-02-08 RX ORDER — FUROSEMIDE 10 MG/ML
120 INJECTION INTRAMUSCULAR; INTRAVENOUS ONCE
Status: COMPLETED | OUTPATIENT
Start: 2023-02-08 | End: 2023-02-08

## 2023-02-08 RX ORDER — OXYCODONE HYDROCHLORIDE 10 MG/1
10 TABLET ORAL 3 TIMES DAILY PRN
Qty: 20 TABLET | Refills: 0 | Status: SHIPPED | OUTPATIENT
Start: 2023-02-08 | End: 2023-02-28

## 2023-02-08 RX ADMIN — FUROSEMIDE 120 MG: 10 INJECTION, SOLUTION INTRAMUSCULAR; INTRAVENOUS at 14:38

## 2023-02-08 NOTE — PROGRESS NOTES
[address]  Progress Note    Primary Care Doctor:  Mónica Jeong MD    Chief Complaint   Patient presents with    Congestive Heart Failure    Shortness of Breath    Dizziness    Fatigue    Bloated        History of Present Illness:  69 y.o. female with history of CAD/CABG in 2018, PAF with maze , HTN, HLD, DVT/PE on coumadin, 2 CVs unsuccessful  -3/10/21 for small bowel pneumatosis with loculated pneumoperitoneum (IV zoysn and TPN), acute on chronic sHF (torsemide decreased at discharge, aldactone was held and coreg dose decreased due to hypotension), TORIBIO on CKD, PAF  ICD placed 2021  covid (antibodies, steroids and antibiotics). She was on prednisone  to  (off for 2 days). - for persistent cough, chest congestion pneumonia after failed po rounds of antibiotics, CT abdomen with pneumatosis and mesenteric gas and inflammation started on antibiotics which she has a couple days left. I had the pleasure of seeing Laquita Guadarrama in follow up for systolic heart failure. She is in a wheel chair with her , Valerie Jerome. She is complaining of swelling, fatigue, edema and bloating. She takes metolazone once a week. She was CV last visit for AF and optival today shows increased impedence and no AF. She did take prednisone for 3 days per PCP. She denies any chest pain, lightheadedness. Her edema is up to her knees. She took 3 days of extra torsemide and no improvement    Past Medical History:   has a past medical history of Asthma, Atrial fibrillation ([address] Eosinophilia, Hemoptysis, HIGH CHOLESTEROL, Hx of blood clots, Hypertension, Irregular heart beat, Other specified gastritis without mention of hemorrhage, Palpitations, Skin cancer, and Type II or unspecified type diabetes mellitus without mention of complication, not stated as uncontrolled. Surgical History:   has a past surgical history that includes Cholecystectomy;   section; Colonoscopy (02/07/2017); skin biopsy; bronchoscopy (07/18/2016); Coronary artery bypass graft (08/21/2018); Mitral valve replacement (08/21/2018); transesophageal echocardiogram (08/21/2018); Tunneled venous catheter placement (Left, 08/23/2018); Cardiac catheterization (08/16/2018); Insertable Cardiac Monitor (Left, 08/16/2018); Colonoscopy (01/17/2014); Upper gastrointestinal endoscopy (N/A, 10/10/2018); Colonoscopy (10/10/2018); Colonoscopy (N/A, 8/7/2020); Pain management procedure (N/A, 12/18/2020); Pain management procedure (Bilateral, 1/18/2021); Cardiac catheterization (5/2 and 5/3 2021); and IR KYPHOPLASTY THORACIC 1 VERTEBRAL BODY (12/8/2022). Social History:   reports that she has never smoked. She has never used smokeless tobacco. She reports that she does not drink alcohol and does not use drugs. Family History:   Family History   Problem Relation Age of Onset    Cancer Father     Asthma Mother     Hypertension Mother     Heart Disease Mother     High Blood Pressure Mother        Home Medications:  Prior to Admission medications    Medication Sig Start Date End Date Taking?  Authorizing Provider   torsemide (DEMADEX) 20 MG tablet Take 1 tablet by mouth daily 1/13/23  Yes Hai Zendejas MD   metoclopramide (REGLAN) 5 MG tablet Take 1 tablet by mouth 2 times daily (with meals) 1/13/23  Yes Hai Zendejas MD   methocarbamol (ROBAXIN) 750 MG tablet Take 1 tablet by mouth 4 times daily as needed (back pain) 1/10/23 2/9/23 Yes Tato An MD   levothyroxine (SYNTHROID) 175 MCG tablet Take 0.5 tablets by mouth Daily 1/11/23  Yes Tato An MD   insulin glargine (LANTUS SOLOSTAR) 100 UNIT/ML injection pen Inject 16 Units into the skin every morning  Patient taking differently: Inject 10-15 Units into the skin every morning 12/14/22  Yes Hai Zendejas MD   midodrine (PROAMATINE) 2.5 MG tablet Take 1 tablet by mouth 3 times daily 11/16/22  Yes Hai Zendejas MD   vitamin D (ERGOCALCIFEROL) 1.25 MG (35377 UT) CAPS capsule TAKE ONE CAPSULE BY MOUTH ONCE WEEKLY 11/16/22  Yes Royal Gonzalez MD   calcitRIOL (ROCALTROL) 0.25 MCG capsule 1 tablet Monday, Wednesday and Friday  Patient taking differently: 0.25 mcg 1 tablet 6 days, skipping Tuesdays 11/16/22  Yes Royal Gonzalez MD   warfarin (COUMADIN) 5 MG tablet TAKE 1 TABLET DAILY  Patient taking differently: Review INR prior to administration.   Managed by PCP 9/14/22  Yes Royal Gonzalez MD   potassium chloride (KLOR-CON M) 10 MEQ extended release tablet TAKE 1 TABLET DAILY 8/31/22  Yes Royal Gonzalez MD   metoprolol succinate (TOPROL XL) 50 MG extended release tablet TAKE 1 TABLET DAILY 8/31/22  Yes Royal Gonzalez MD   montelukast (SINGULAIR) 10 MG tablet TAKE 1 TABLET NIGHTLY 8/31/22  Yes Royal Gonzalez MD   insulin lispro, 1 Unit Dial, (HUMALOG KWIKPEN) 100 UNIT/ML SOPN USE SLIDING SCALE, 140-199, 2 UNITS, 200-249, 3 UNITS, 250-300, 4 UNITS, GREATER THAN 300, INJECT 5 UNITS 8/15/22  Yes Royal Gonzalez MD   metOLazone (ZAROXOLYN) 2.5 MG tablet TAKE 1 TABLET ON TUESDAY AND FRIDAY AND AS DIRECTED 5/3/22  Yes Violeta Packer, APRN - CNS   amiodarone (CORDARONE) 200 MG tablet TAKE 1 TABLET DAILY  Patient taking differently: Take 100 mg by mouth daily 3/10/22  Yes Edward Underwood MD   albuterol sulfate  (90 Base) MCG/ACT inhaler USE 2 INHALATIONS EVERY 6 HOURS AS NEEDED FOR WHEEZING 11/19/21  Yes Royal Gonzalez MD   ACETAMINOPHEN PO Take 500 mg by mouth every 6 hours as needed    Yes Historical Provider, MD   omeprazole (PRILOSEC) 20 MG delayed release capsule Take 20 mg by mouth daily   Yes Historical Provider, MD   ondansetron (ZOFRAN) 4 MG tablet Take 1 tablet by mouth 3 times daily as needed for Nausea or Vomiting 3/26/20  Yes Royal Gonzalez MD   aspirin 81 MG chewable tablet Take 1 tablet by mouth daily 6/7/19  Yes Royal Gonzalez MD   vitamin B-12 500 MCG tablet Take 1 tablet by mouth daily 10/12/18  Yes Robert Rose MD   nystatin (MYCOSTATIN) 880305 UNIT/ML suspension Take 5 mLs by mouth 4 times daily 12/21/22   SOLEDAD Steve - CNP   blood glucose test strips (FREESTYLE LITE) strip USE TO TEST BLOOD SUGAR FOUR TIMES A DAY 9/15/22   Mayito Hinton MD   ipratropium-albuterol (DUONEB) 0.5-2.5 (3) MG/3ML SOLN nebulizer solution Inhale 3 mLs into the lungs every 4 hours as needed for Shortness of Breath  Patient not taking: Reported on 2/8/2023 11/15/21   Kari Alatorre MD   FreeStyle Lancets MISC 1 each by Does not apply route 4 times daily 9/7/21   Kari Alatorre MD   Blood Glucose Monitoring Suppl (FREESTYLE LITE) GAETANO 1 Device by Does not apply route 4 times daily    Historical Provider, MD   Insulin Pen Needle (BD PEN NEEDLE JANENT U/F) 32G X 4 MM MISC USE 1 PEN NEEDLE FOUR TIMES A DAY 6/14/21   Kari Alatorre MD   polyethylene glycol (GLYCOLAX) 17 GM/SCOOP powder Take 17 g by mouth as needed    Historical Provider, MD        Allergies:  Owoomgjm-dymegil-ojeerl [fluocinolone], Ciprofloxacin, Diovan [valsartan], Flagyl [metronidazole], Metformin and related [metformin and related], Benazepril, Morphine, Saxagliptin, and Levaquin [levofloxacin]     Review of Systems:   Constitutional: there has been no unanticipated weight loss. There's been no change in energy level, sleep pattern, or activity level. Eyes: No visual changes or diplopia. No scleral icterus. ENT: No Headaches, hearing loss or vertigo. No mouth sores or sore throat. Cardiovascular: Reviewed in HPI  Respiratory: No cough or wheezing, no sputum production. No hematemesis. Gastrointestinal: No abdominal pain, appetite loss, blood in stools. No change in bowel or bladder habits. Genitourinary: No dysuria, trouble voiding, or hematuria. Musculoskeletal:  No gait disturbance, weakness or joint complaints. Integumentary: No rash or pruritis.   Neurological: No headache, diplopia, change in muscle strength, numbness or tingling. No change in gait, balance, coordination, mood, affect, memory, mentation, behavior. Psychiatric: No anxiety, no depression. Endocrine: No malaise, fatigue or temperature intolerance. No excessive thirst, fluid intake, or urination. No tremor. Hematologic/Lymphatic: No abnormal bruising or bleeding, blood clots or swollen lymph nodes. Allergic/Immunologic: No nasal congestion or hives. Physical Examination:    Vitals:    02/08/23 1341   BP: 106/60   Pulse: 83   SpO2: 95%   Weight: 205 lb 3.2 oz (93.1 kg)   Height: 5' 6\" (1.676 m)        Constitutional and General Appearance: Warm and dry, no apparent distress, normal coloration  HEENT:  Normocephalic, atraumatic  Respiratory:  Normal excursion and expansion without use of accessory muscles  Resp Auscultation: wheezing  Cardiovascular: The apical impulses not displaced  Heart tones are crisp and normal  JVP normal cm H2O  NSR no AF on optival  Peripheral pulses are symmetrical and full  There is no clubbing, cyanosis of the extremities.   Bilateral ankle to knee edema  Pedal Pulses: 2+ and equal   Abdomen:   No masses or tenderness  Liver/Spleen: No Abnormalities Noted  Neurological/Psychiatric:  Alert and oriented in all spheres  Moves all extremities well  Exhibits normal gait balance and coordination  No abnormalities of mood, affect, memory, mentation, or behavior are noted    Lab Data:    CBC:   Lab Results   Component Value Date/Time    WBC 5.7 02/08/2023 02:38 PM    WBC 6.1 01/30/2023 11:11 AM    WBC 5.3 01/10/2023 05:39 AM    RBC 3.56 02/08/2023 02:38 PM    RBC 3.65 01/30/2023 11:11 AM    RBC 3.82 01/10/2023 05:39 AM    HGB 10.5 02/08/2023 02:38 PM    HGB 10.8 01/30/2023 11:11 AM    HGB 11.5 01/10/2023 05:39 AM    HCT 33.3 02/08/2023 02:38 PM    HCT 33.6 01/30/2023 11:11 AM    HCT 35.3 01/10/2023 05:39 AM    MCV 93.6 02/08/2023 02:38 PM    MCV 92.0 01/30/2023 11:11 AM    MCV 92.2 01/10/2023 05:39 AM    RDW 16.5 02/08/2023 02:38 PM    RDW 15.5 01/30/2023 11:11 AM    RDW 15.5 01/10/2023 05:39 AM     02/08/2023 02:38 PM     01/30/2023 11:11 AM     01/10/2023 05:39 AM     BMP:  Lab Results   Component Value Date/Time     02/08/2023 02:38 PM     01/30/2023 11:11 AM     01/10/2023 05:39 AM    K 3.1 02/08/2023 02:38 PM    K 3.3 01/30/2023 11:11 AM    K 3.7 01/10/2023 05:39 AM    K 3.5 01/04/2023 06:00 AM    K 4.6 01/03/2023 01:57 PM    K 4.0 05/18/2022 04:23 AM     02/08/2023 02:38 PM     01/30/2023 11:11 AM     01/10/2023 05:39 AM    CO2 29 02/08/2023 02:38 PM    CO2 29 01/30/2023 11:11 AM    CO2 24 01/10/2023 05:39 AM    PHOS 3.2 01/30/2023 11:11 AM    PHOS 3.2 01/10/2023 05:39 AM    PHOS 3.6 01/09/2023 05:56 AM    BUN 29 02/08/2023 02:38 PM    BUN 26 01/30/2023 11:11 AM    BUN 51 01/10/2023 05:39 AM    CREATININE 1.3 02/08/2023 02:38 PM    CREATININE 1.3 01/30/2023 11:11 AM    CREATININE 2.1 01/10/2023 05:39 AM     BNP:   Lab Results   Component Value Date/Time    PROBNP 10,561 02/08/2023 02:38 PM    PROBNP 5,501 11/08/2022 03:24 PM    PROBNP 3,426 09/06/2022 08:49 AM     Cardiac Imaging:  Echo 1/6/2023   Summary   Left ventricular cavity size is normal with mild concentric left ventricular   hypertrophy. Ejection fraction is visually estimated to be 30%. There is severe diffuse global hypokinesis with akinesis of the apex, apical   septum, apical lateral, apical inferior, and apical anterior walls. Cannot determine diastology due to mitral valve replacement. Left atrium is severely dilated. At least Mild aortic stenosis with a peak velocity of 2.4m/s and a mean   pressure gradient of 14mmHg. At least moderate aortic regurgitation. A bioprosthetic mitral valve is well seated with peak velocity of 2.1m/s and   a mean gradient of 7mmHg. No evidence of mitral regurgitation. Mild to moderate tricuspid regurgitation. RVSP is estimated to be 31-34 mmHG. IVC not well visualized.     Echo 5/6/2022  Summary   Technically difficult examination due to visualization and irregular rhythm   Normal left ventricle size. There is moderate concentric left ventricular hypertrophy. Ejection fraction is visually estimated to be 40%. Overall left ventricular systolic function appears moderately reduced. There is akinesis of the apex walls. 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   No evidence of apical thrombus by contrast administration. A bioprosthetic mitral valve is well seated. Visually opens well. Cannot   adequately assess for dysfunction as gradients not obtained. The left atrium is moderately dilated. No pericardial effusion. JUDE Preliminary 5/4/2021 Dr Alonzo Ege  AV - area ~ 1.5, opens adequately, not severe aortic stenosis     [facility] 5/3/2021 Dr Alonzo Egkathy  Artery Findings/Result   LM Normal   LAD 50% mid, 70% mid, competitive flow distal from LIMA   Cx OM1 20% ostial to prox, superior branch mid OM1 50% ostial   RI N/A   S-D-RPL patent   L-LAD patent   RCA 50-60% distal, very heavily calcified   LVEDP 15   AV Peak to peak gradient 36mmHg, valve crossed easily with pigtail. LVG NA      Intervention:         None     Post Cath Dx:       Patent grafts     Echo 4/20/21  Summary   Overall left ventricular systolic function is severely depressed . Ejection fraction is visually estimated to be 10-15 %. E/e'= 23.2 GLS=-3.4   Moderately dilated left ventricle. There is severe diffuse hypokinesis. Indeterminate diastolic function. A bioprosthetic mitral valve is well seated with peak velocity of 1.59m/s   and a mean gradient of 3mmHg. The left atrium is moderately dilated. Mild aortic stenosis with a peak velocity of 2.5m/s and a mean pressure   gradient of 14mmHg. The aortic valve is thickened/calcified with decreased leaflet mobility   consistent with aortic stenosis. Mild to moderate tricuspid regurgitation.    Estimated pulmonary artery systolic pressure is mildly to moderately   elevated at 46 mmHg assuming a right atrial pressure of 8 mmHg    11/30/2020 Dobutamine Echo   Dobutamine echocardiogram for aortic stenosis. Very technically limited exam due to atrial fibrillation. Baseline echocardiogram shows severe left ventricular dysfunction with   anterior, lateral and apical hypokinesis with an ejection fraction of 20-25%. There is no improvement in wall motion with low or high dose dobutamine   suggestive of nonviable myocardium. Mild prosthetic aortic valve stenosis with a mean gradient of 16mmHg and   peak velocity of 2.47m/s at rest. After dobutamine stress the mean AV   gradient rises to 20mmHg with 20mcg of dobutamine consistent with mild to   moderate aortic stenosis. Prosthetic mitral valve with a mean gradient of 7mmHg        JUDE 10/21/2020  Mildly dilated left ventricular size and normal wall thickness. Global left ventricular function is severely decreased with ejection fraction estimated at 25%. Patient is in atrial fibrillation during procedure. The bioprosthetic mitral valve is well seated with a mean gradient of 5mmHg and maximum pressure gradient of 15 mmHg with heart rate of 89 bpm. Pressure half time of 82 ms. There is trivial mitral regurgitation. Left atrial enlargement. Spontaneous echo contrast seen in the left atrium. A large stump of the left atrial appendage with no thrombus noted. Patient has history of surgical ligation of the left atrial appendage. There are spontaneous echo contrast in the atrial appendage. The aortic valve is thickened/calcified with decreased leaflet mobility consistent with aortic stenosis. There is trivial aortic insufficiency. There is moderate tricuspid regurgitation. ECHO 9/15/20  Left ventricular cavity size is dilated. There is normal wall thickness with a moderately severe reduction in   systolic function. LV ejection fraction is visually estimated to be 25-30%. Indeterminate diastolic function.    The right ventricle is not well visualized but appears to be normal in size with moderately reduced function. The left atrium is moderately dilated. A bioprosthetic mitral valve with a mean gradient of 7 mmHg. This may be a normal gradient for this valve but could suggest mild stenosis. Trivial mitral regurgitation. The aortic valve is thickened/calcified with a mean gradient of 16mmHg consistent with at least mild aortic stenosis. This is likely underestimated due to low cardiac output secondary to LV dysfunction. No significant aortic valve regurgitation. Moderate tricuspid regurgitation with RVSP of 48 mmHg. JUDE 11/1/2019  Normal left ventricular cavity size and wall thickness. Global left ventricular function is moderate-to-severely decreased with ejection fraction estimated from 35% to 40%. Severe apical akinesis noted. The bioprosthetic mitral valve is well seated with a mean gradient of 2mmHg and maximum pressure gradient of 5 mmHg. There is trivial mitral regurgitation. Left atrial enlargement. Spontaneous echo contrast seen in the left atrium. Stump of the left atrial appendage noted with no thrombus. The aortic valve is thickened/calcified with decreased leaflet mobility consistent with aortic stenosis. There is trivial aortic insufficiency    Assessment:    1. Chronic systolic heart failure due to valvular disease (HCC) BB and aldosterone antagonist; no ace/arb due ckd   2. PAF (paroxysmal atrial fibrillation) (HCC) on coumadin in nsr   3. S/P CABG x 3    4. Nonrheumatic aortic valve stenosis    5. Hypotension on midodrine   6. Chronic kidney disease   7. ICD in place  8. Anemia  9.       Vitamin d deficiency      Plan:   Patient Instructions   Stat blood work and IV lasix 120 mg in infusion center today  Continue all current medications  Will let you know tomorrow results of of lab  RTO in 1 months    I appreciate the opportunity of cooperating in the care of this individual.    Angel White, APRN - CNS, CNS, 2/8/2023, 4:39 PM

## 2023-02-08 NOTE — PATIENT INSTRUCTIONS
Stat blood work and IV lasix 120 mg in infusion center today  Continue all current medications  Will let you know tomorrow results of of lab  RTO in 1 months

## 2023-02-08 NOTE — TELEPHONE ENCOUNTER
oxyCODONE (ROXICODONE) 5 MG immediate release tablet [[id number]]  DISCONTINUED    Order Details  Dose: 5 mg Route: Oral Frequency: 3 TIMES DAILY PRN for Pain   Dispense Quantity: 90 tablet Refills: 0    Note to Pharmacy: Reduce doses taken as pain becomes manageable         Sig: Take 1 tablet by mouth 3 times daily as needed for Pain for up to 30 days.    [id number] - [address]

## 2023-02-08 NOTE — TELEPHONE ENCOUNTER
Medication:   Requested Prescriptions     Pending Prescriptions Disp Refills    oxyCODONE HCl (OXY-IR) 10 MG immediate release tablet 20 tablet 0     Sig: Take 1 tablet by mouth 3 times daily as needed for Pain for up to 20 days. Max Daily Amount: 30 mg      Last Filled:  1/10/2023    Patient Phone Number: [phone] (home)     Last appt: 1/13/2023   Next appt: 2/14/2023    Last OARRS:   RX Monitoring 3/1/2019   Attestation The Prescription Monitoring Report for this patient was reviewed today. Periodic Controlled Substance Monitoring -     PDMP Monitoring:    Last PDMP Kurt Matta as Reviewed [facility]):  Review User Review Instant Review Result   Delores Franklinyunior 1/26/2023 10:49 AM Reviewed PDMP [1]     Preferred Pharmacy:   [address] [place] [address], [address] [phone] - f [phone]  [address] [place] [address]  Phone: [phone] Fax: [phone]    [id number], [address] [place] [address] [phone] - f [phone]  [address] [place] [address] Rd.   Julio Inman [address]  Phone: [phone] Fax: [phone]

## 2023-02-09 ENCOUNTER — TELEPHONE (OUTPATIENT)
Dept: CARDIOLOGY CLINIC | Age: 78
End: 2023-02-09

## 2023-02-09 DIAGNOSIS — R06.02 SOB (SHORTNESS OF BREATH): ICD-10-CM

## 2023-02-09 DIAGNOSIS — I15.9 SECONDARY HYPERTENSION: Primary | ICD-10-CM

## 2023-02-09 LAB
FERRITIN: 98.4 NG/ML (ref 15–150)
IRON SATURATION: 18 % (ref 15–50)
IRON: 44 UG/DL (ref 37–145)
TOTAL IRON BINDING CAPACITY: 248 UG/DL (ref 260–445)

## 2023-02-09 RX ORDER — POTASSIUM CHLORIDE 750 MG/1
TABLET, EXTENDED RELEASE ORAL
Qty: 180 TABLET | Refills: 1 | Status: SHIPPED | OUTPATIENT
Start: 2023-02-09

## 2023-02-09 NOTE — TELEPHONE ENCOUNTER
----- Message from SOLEDAD Ayala - CNS sent at 2/9/2023  8:49 AM EST -----  See how she is doing since she received IV lasix yesterday  Her fluid was really elevated  Her potassium is low and she needs to take 20 meq daily  She needs to take metolazone twice a week  Recheck blood work in 2 weeks

## 2023-02-10 ENCOUNTER — TELEPHONE (OUTPATIENT)
Dept: FAMILY MEDICINE CLINIC | Age: 78
End: 2023-02-10

## 2023-02-10 ENCOUNTER — ANTI-COAG VISIT (OUTPATIENT)
Dept: FAMILY MEDICINE CLINIC | Age: 78
End: 2023-02-10

## 2023-02-10 LAB — INR BLD: 1.4

## 2023-02-10 RX ORDER — MONTELUKAST SODIUM 10 MG/1
TABLET ORAL
Qty: 90 TABLET | Refills: 1 | Status: SHIPPED | OUTPATIENT
Start: 2023-02-10

## 2023-02-10 RX ORDER — METOPROLOL SUCCINATE 50 MG/1
TABLET, EXTENDED RELEASE ORAL
Qty: 90 TABLET | Refills: 1 | Status: SHIPPED | OUTPATIENT
Start: 2023-02-10

## 2023-02-10 NOTE — TELEPHONE ENCOUNTER
Medication:   Requested Prescriptions     Pending Prescriptions Disp Refills    montelukast (SINGULAIR) 10 MG tablet [Pharmacy Med Name: MONTELUKAST SODIUM TABS 10MG] 90 tablet 0     Sig: TAKE 1 TABLET NIGHTLY    metoprolol succinate (TOPROL XL) 50 MG extended release tablet [Pharmacy Med Name: METOPROLOL SUCCINATE ER TABS 50MG] 90 tablet 0     Sig: TAKE 1 TABLET DAILY      Last Filled:      Patient Phone Number: [phone] (home)     Last appt: 1/13/2023   Next appt: 2/14/2023    Last OARRS:   RX Monitoring 3/1/2019   Attestation The Prescription Monitoring Report for this patient was reviewed today. Periodic Controlled Substance Monitoring -     PDMP Monitoring:    Last PDMP Vinie Service as Reviewed [facility]):  Review User Review Instant Review Result   Kusum Archer 1/26/2023 10:49 AM Reviewed PDMP [1]     Preferred Pharmacy:   [address] [place] [address], [place] [phone] -  [phone]  [address] [place] [address]  Phone: [phone] Fax: [phone]    [facility]/pharmacy #[address] [place] [address] [phone] - f [phone]  [address] [place] [address] Rd.   Fernando Brewster [address]  Phone: [phone] Fax: [phone]

## 2023-02-13 ENCOUNTER — CARE COORDINATION (OUTPATIENT)
Dept: CASE MANAGEMENT | Age: 78
End: 2023-02-13

## 2023-02-13 NOTE — CARE COORDINATION
[facility] Care Transitions Follow Up Call    Patient Current Location:  Home: [address] [place] [id number]    Care Transition Nurse contacted the  pt and spouse  by telephone to follow up after admission. Verified name and  with patient as identifiers. Patient: Zuleyma Casarez  Patient : 1945   MRN: [id number]  Reason for Admission:   Discharge Date: 23 RARS: Readmission Risk Score: 20.3      Needs to be reviewed by the provider   Additional needs identified to be addressed with provider: No  none             Method of communication with provider: none. .Pt  and spouse states doing pretty well, no new issues or concerns. Swelling in feet has gone down but still has some. F/U appts listed below. No need for further f/u CTC calls. Addressed changes since last contact:  none  Discussed follow-up appointments. If no appointment was previously scheduled, appointment scheduling offered: No.   Is follow up appointment scheduled within 7 days of discharge?  No.    Follow Up  Future Appointments   Date Time Provider Tawana Young   2023 11:00 AM Lety Boss MD [address]   2023  8:45 AM MUNDO Ruano [facility]   3/2/2023  8:15 AM MUNDO Ruano [facility]   3/6/2023 11:00 AM SCHEDULE, SHERYL OPTIVOL CHECK FF [facility]   3/16/2023  1:30 PM SOLEDAD Ramirez - CNS [facility]   3/16/2023  2:30 PM SCHEDULE, [facility] DEVICE CHECK FF [facility]   4/10/2023 11:15 AM SCHEDULE, [place] OPTIVOL CHECK FF [facility]   2023  7:45 AM SCHEDULE, [place] OPTIVOL CHECK FF [facility]   2023 10:30 AM SCHEDULE, [place] OPTIVOL CHECK FF [facility]   2023  8:45 AM Fredi Bradford MD FF [facility]   2023  8:00 AM SCHEDULE, SHERYL OPTIVOL CHECK FF [facility]   2023  7:30 AM SCHEDULE, [place] OPTIVOL CHECK FF Card[facility]   10/23/2023  7:30 AM SCHEDULE, SHERYL OPTIVOL CHECK FF Card[facility]   2023  7:30 AM SCHEDULE, SHERYL OPTIVOL CHECK FF Cardio [facility]     Non-BS follow up appointment(s):     Care Transition Nurse reviewed medical action plan with  pt and spouse  and discussed any barriers to care and/or understanding of plan of care after discharge. Discussed appropriate site of care based on symptoms and resources available to patient including: When to call 911. The patient agrees to contact the PCP office for questions related to their healthcare. Advance Care Planning:   reviewed and current. Offered patient enrollment in the Remote Patient Monitoring (RPM) program for in-home monitoring: Patient declined. Care Transitions Subsequent and Final Call    Subsequent and Final Calls  Do you have any ongoing symptoms?: No  Have your medications changed?: No  Do you have any questions related to your medications?: No  Do you currently have any active services?: No  Are you currently active with any services?: Home Health  Do you have any needs or concerns that I can assist you with?: No  Identified Barriers: None  Care Transitions Interventions  No Identified Needs  Other Interventions:             Care Transition Nurse provided contact information for future needs. No further follow-up call indicated based on severity of symptoms and risk factors.   Plan for next call:  n/a    Segundo Vera RN

## 2023-02-14 ENCOUNTER — TELEPHONE (OUTPATIENT)
Dept: FAMILY MEDICINE CLINIC | Age: 78
End: 2023-02-14

## 2023-02-14 ENCOUNTER — OFFICE VISIT (OUTPATIENT)
Dept: FAMILY MEDICINE CLINIC | Age: 78
End: 2023-02-14
Payer: MEDICARE

## 2023-02-14 VITALS
SYSTOLIC BLOOD PRESSURE: 126 MMHG | WEIGHT: 199.25 LBS | OXYGEN SATURATION: 96 % | BODY MASS INDEX: 32.16 KG/M2 | RESPIRATION RATE: 12 BRPM | DIASTOLIC BLOOD PRESSURE: 70 MMHG | TEMPERATURE: 97 F | HEART RATE: 86 BPM

## 2023-02-14 DIAGNOSIS — Z79.4 TYPE 2 DIABETES MELLITUS WITH STAGE 3B CHRONIC KIDNEY DISEASE, WITH LONG-TERM CURRENT USE OF INSULIN (HCC): Primary | ICD-10-CM

## 2023-02-14 DIAGNOSIS — E11.43 GASTROPARESIS DUE TO DM (HCC): ICD-10-CM

## 2023-02-14 DIAGNOSIS — E11.22 TYPE 2 DIABETES MELLITUS WITH STAGE 3B CHRONIC KIDNEY DISEASE, WITH LONG-TERM CURRENT USE OF INSULIN (HCC): Primary | ICD-10-CM

## 2023-02-14 DIAGNOSIS — N18.32 TYPE 2 DIABETES MELLITUS WITH STAGE 3B CHRONIC KIDNEY DISEASE, WITH LONG-TERM CURRENT USE OF INSULIN (HCC): Primary | ICD-10-CM

## 2023-02-14 DIAGNOSIS — I15.9 SECONDARY HYPERTENSION: ICD-10-CM

## 2023-02-14 DIAGNOSIS — I42.9 CARDIOMYOPATHY, UNSPECIFIED TYPE (HCC): ICD-10-CM

## 2023-02-14 DIAGNOSIS — I48.11 LONGSTANDING PERSISTENT ATRIAL FIBRILLATION (HCC): ICD-10-CM

## 2023-02-14 DIAGNOSIS — S22.000D COMPRESSION FRACTURE OF THORACIC VERTEBRA WITH ROUTINE HEALING, UNSPECIFIED THORACIC VERTEBRAL LEVEL, SUBSEQUENT ENCOUNTER: ICD-10-CM

## 2023-02-14 DIAGNOSIS — I50.32 CHRONIC DIASTOLIC HEART FAILURE (HCC): ICD-10-CM

## 2023-02-14 DIAGNOSIS — K31.84 GASTROPARESIS DUE TO DM (HCC): ICD-10-CM

## 2023-02-14 PROCEDURE — 3074F SYST BP LT 130 MM HG: CPT | Performed by: FAMILY MEDICINE

## 2023-02-14 PROCEDURE — 99214 OFFICE O/P EST MOD 30 MIN: CPT | Performed by: FAMILY MEDICINE

## 2023-02-14 PROCEDURE — 3078F DIAST BP <80 MM HG: CPT | Performed by: FAMILY MEDICINE

## 2023-02-14 PROCEDURE — 1123F ACP DISCUSS/DSCN MKR DOCD: CPT | Performed by: FAMILY MEDICINE

## 2023-02-14 PROCEDURE — 3044F HG A1C LEVEL LT 7.0%: CPT | Performed by: FAMILY MEDICINE

## 2023-02-14 RX ORDER — TORSEMIDE 20 MG/1
40 TABLET ORAL DAILY
Qty: 90 TABLET | Refills: 1 | Status: SHIPPED | OUTPATIENT
Start: 2023-02-14

## 2023-02-14 SDOH — ECONOMIC STABILITY: INCOME INSECURITY: HOW HARD IS IT FOR YOU TO PAY FOR THE VERY BASICS LIKE FOOD, HOUSING, MEDICAL CARE, AND HEATING?: NOT HARD AT ALL

## 2023-02-14 SDOH — ECONOMIC STABILITY: FOOD INSECURITY: WITHIN THE PAST 12 MONTHS, YOU WORRIED THAT YOUR FOOD WOULD RUN OUT BEFORE YOU GOT MONEY TO BUY MORE.: NEVER TRUE

## 2023-02-14 SDOH — ECONOMIC STABILITY: FOOD INSECURITY: WITHIN THE PAST 12 MONTHS, THE FOOD YOU BOUGHT JUST DIDN'T LAST AND YOU DIDN'T HAVE MONEY TO GET MORE.: NEVER TRUE

## 2023-02-14 SDOH — ECONOMIC STABILITY: HOUSING INSECURITY
IN THE LAST 12 MONTHS, WAS THERE A TIME WHEN YOU DID NOT HAVE A STEADY PLACE TO SLEEP OR SLEPT IN A SHELTER (INCLUDING NOW)?: NO

## 2023-02-14 NOTE — TELEPHONE ENCOUNTER
Carolina Trinidad from [facility] called to report an out of range INR. INR was 1.2 this morning. Please advise

## 2023-02-14 NOTE — PROGRESS NOTES
Subjective:      Patient ID: Blanca August is a 77 y.o. female.  CC: Patient presents for re-evaluation of chronic health problems including diabetes mellitus, hypertension, atrial fibrillation, recent compression fracture, and gastroparesis.    HPI pt is here for a follow up in accompaniment of . Pt just had her INR check and this was 1.2. Pt's  stated that he has been forgetting to give her the coumadin pill.  Since last office evaluation she sustained a right wrist fracture and is wearing a splint follow-up with orthopedic specialist.  She was found to have atrial fibrillation with rapid ventricular response and underwent a cardioversion January 17.  Patient never did feel her heart racing.  She still complaining of bilateral leg edema and feeling short of breath.  Her weight is up from prior evaluations.  Patient was taken off diuretic therapy while she was in the hospital with low blood pressure.  She just had recent laboratory profiling demonstrating hypokalemia and was placed on additional potassium supplementation.  The vomiting symptoms have completely resolved with using Reglan for the gastroparesis.  Bowels are working normal for her.    Eye exam current (within one year): Yes    Checks sugars at home: yes  Home blood sugar records: patient tests 4 time(s) per day  Any episodes of hypoglycemia? Yes    Current medication use: taking as prescribed  Medication side effects: none     Current diet: in general, a \"healthy\" diet    Current exercise:not active     Review of Systems  Patient Active Problem List   Diagnosis    Anticoagulated on Coumadin    Hypercholesteremia    Generalized osteoarthrosis, involving multiple sites    Eosinophilic gastritis    Paroxysmal SVT (supraventricular tachycardia) (HCC)    B12 deficiency    History of pulmonary embolism    Multiple pulmonary nodules    Asthma    Atrial fibrillation (HCC)    Hypertension    Coronary artery disease of native artery of native  heart with stable angina pectoris (HCC)    Nonrheumatic mitral valve regurgitation    S/P MVR (mitral valve replacement)    S/P CABG x 3    Goiter    Primary osteoarthritis of both knees    Splenic infarct    Obesity, Class I, BMI 30-34.9    Chronic systolic CHF (congestive heart failure), NYHA class 3 ([facility])    Degenerative disc disease, thoracic    Persistent atrial fibrillation ([facility])    S/P MVR (mitral valve repair)    Ischemic cardiomyopathy    Occlusion and stenosis of bilateral carotid arteries    Pneumatosis intestinalis    Aortic valve stenosis    Deep vein thrombosis (DVT) of non-extremity vein    Acute on chronic systolic heart failure due to valvular disease ([facility])    Stage 4 chronic kidney disease ([facility])    Acute kidney injury superimposed on CKD ([facility])    Chronic diastolic heart failure ([facility])    Atherosclerosis of superior mesenteric artery ([address]    ICD (implantable cardioverter-defibrillator) in place    Type 2 diabetes mellitus with stage 3b chronic kidney disease, with long-term current use of insulin ([facility])    Acquired hypothyroidism    Hypercoagulable state, secondary ([address]    Chronic midline thoracic back pain    Chronic Compression fracture of thoracic vertebra ([facility])    Diarrhea    Hyponatremia    Lumbar stenosis    Right leg weakness    Cardiomyopathy ([address]    Ileus ([address]    Gastroparesis due to DM [facility])       Outpatient Medications Marked as Taking for the 2/14/23 encounter (Office Visit) with Faby Chavira MD   Medication Sig Dispense Refill    metOLazone (ZAROXOLYN) 2.5 MG tablet TAKE 1 TABLET ON TUESDAY AND FRIDAY AND AS DIRECTED 30 tablet 1    montelukast (SINGULAIR) 10 MG tablet TAKE 1 TABLET NIGHTLY 90 tablet 1    metoprolol succinate (TOPROL XL) 50 MG extended release tablet TAKE 1 TABLET DAILY 90 tablet 1    potassium chloride (KLOR-CON M) 10 MEQ extended release tablet TAKE 2 TABLET DAILY 180 tablet 1    oxyCODONE HCl (OXY-IR) 10 MG immediate release tablet Take 1 tablet by mouth 3 times daily as needed for Pain for up to 20 days. Max Daily Amount: 30 mg 20 tablet 0    torsemide (DEMADEX) 20 MG tablet Take 1 tablet by mouth daily 90 tablet 1    metoclopramide (REGLAN) 5 MG tablet Take 1 tablet by mouth 2 times daily (with meals) 60 tablet 3    levothyroxine (SYNTHROID) 175 MCG tablet Take 0.5 tablets by mouth Daily 30 tablet 3    insulin glargine (LANTUS SOLOSTAR) 100 UNIT/ML injection pen Inject 16 Units into the skin every morning (Patient taking differently: Inject 10-15 Units into the skin every morning) 90 mL 1    midodrine (PROAMATINE) 2.5 MG tablet Take 1 tablet by mouth 3 times daily 90 tablet 2    vitamin D (ERGOCALCIFEROL) 1.25 MG (82476 UT) CAPS capsule TAKE ONE CAPSULE BY MOUTH ONCE WEEKLY 12 capsule 1    calcitRIOL (ROCALTROL) 0.25 MCG capsule 1 tablet Monday, Wednesday and Friday (Patient taking differently: 0.25 mcg 1 tablet 6 days, skipping Tuesdays) 30 capsule 3    blood glucose test strips (FREESTYLE LITE) strip USE TO TEST BLOOD SUGAR FOUR TIMES A  strip 3    warfarin (COUMADIN) 5 MG tablet TAKE 1 TABLET DAILY (Patient taking differently: Review INR prior to administration.   Managed by PCP) 100 tablet 3    insulin lispro, 1 Unit Dial, (HUMALOG KWIKPEN) 100 UNIT/ML SOPN USE SLIDING SCALE, 140-199, 2 UNITS, 200-249, 3 UNITS, 250-300, 4 UNITS, GREATER THAN 300, INJECT 5 UNITS 15 mL 2    amiodarone (CORDARONE) 200 MG tablet TAKE 1 TABLET DAILY (Patient taking differently: Take 100 mg by mouth daily) 60 tablet 5    albuterol sulfate  (90 Base) MCG/ACT inhaler USE 2 INHALATIONS EVERY 6 HOURS AS NEEDED FOR WHEEZING 25.5 g 2    FreeStyle Lancets MISC 1 each by Does not apply route 4 times daily 200 each 5    Blood Glucose Monitoring Suppl (FREESTYLE LITE) GAETANO 1 Device by Does not apply route 4 times daily      Insulin Pen Needle (BD PEN NEEDLE JANNET U/F) 32G X 4 MM MISC USE 1 PEN NEEDLE FOUR TIMES A  each 3    ACETAMINOPHEN PO Take 500 mg by mouth every 6 hours as needed       polyethylene glycol (GLYCOLAX) 17 GM/SCOOP powder Take 17 g by mouth as needed      omeprazole (PRILOSEC) 20 MG delayed release capsule Take 20 mg by mouth daily      ondansetron (ZOFRAN) 4 MG tablet Take 1 tablet by mouth 3 times daily as needed for Nausea or Vomiting 30 tablet 0    aspirin 81 MG chewable tablet Take 1 tablet by mouth daily 30 tablet 3    vitamin B-12 500 MCG tablet Take 1 tablet by mouth daily 30 tablet 3       Allergies   Allergen Reactions    Ubizgpqs-Vpcvxga-Jkqnpi [Fluocinolone] Shortness Of Breath    Ciprofloxacin Shortness Of Breath    Diovan [Valsartan] Shortness Of Breath    Flagyl [Metronidazole] Shortness Of Breath     Has taken diflucan at home 12/7/15    Metformin And Related [Metformin And Related] Shortness Of Breath    Benazepril      Other reaction(s): Not Recorded    Morphine      Bad reaction. \"makes her feel horrible\". Saxagliptin      Other reaction(s): Not Recorded    Levaquin [Levofloxacin] Rash       Social History     Tobacco Use    Smoking status: Never    Smokeless tobacco: Never   Substance Use Topics    Alcohol use: No       /70 (Site: Left Upper Arm, Position: Sitting, Cuff Size: Large Adult)   Pulse 86   Temp 97 °F (36.1 °C) (Infrared)   Resp 12   Wt 199 lb 4 oz (90.4 kg)   SpO2 96%   BMI 32.16 kg/m²      Objective:   Physical Exam  Constitutional:       General: She is not in acute distress. Appearance: She is well-developed. Neck:      Vascular: No carotid bruit. Cardiovascular:      Rate and Rhythm: Normal rate. Rhythm irregularly irregular. Pulses:           Dorsalis pedis pulses are 2+ on the right side and 2+ on the left side. Posterior tibial pulses are 2+ on the right side and 2+ on the left side. Heart sounds: Murmur (Right sternal border) heard. Systolic murmur is present with a grade of 2/6. No friction rub. No gallop.    Pulmonary:      Effort: Pulmonary effort is normal.      Breath sounds: Normal breath sounds. Abdominal:      General: Bowel sounds are normal. There is distension. Palpations: Abdomen is soft. Tenderness: There is no abdominal tenderness. There is no right CVA tenderness or left CVA tenderness. Musculoskeletal:         General: No tenderness. Normal range of motion. Right lower leg: Edema (3+ edema of both lower extremities) present. Left lower leg: Edema present. Right foot: Normal.      Left foot: Normal.   Skin:     Findings: No rash. Neurological:      Mental Status: She is alert and oriented to person, place, and time. Sensory: Sensation is intact. Motor: Motor function is intact. Psychiatric:         Behavior: Behavior is cooperative. Assessment:      Daniel Arias was seen today for 1 month follow-up. Diagnoses and all orders for this visit:    Type 2 diabetes mellitus with stage 3b chronic kidney disease, with long-term current use of insulin ([address]    Secondary hypertension  -     Basic Metabolic Panel; Future    Longstanding persistent atrial fibrillation (HCC)    Compression fracture of thoracic vertebra with routine healing, unspecified thoracic vertebral level, subsequent encounter    Gastroparesis due to DM (HCC)    Cardiomyopathy, unspecified type ([address]    Chronic diastolic heart failure ([address]    Other orders  -     torsemide (DEMADEX) 20 MG tablet; Take 2 tablets by mouth daily  -     carbamide peroxide (DEBROX) 6.5 % otic solution; Place 5 drops into the left ear daily          Plan:      Reviewed labs and test results with patient. For the gastroparesis maintain Reglan 5 mg twice daily    Diabetes mellitus is much better controlled unfortunate because she has chronic renal failure at this time. Recommend to decrease Lantus insulin to 12 units and continue monitoring fasting blood sugars. If the range continues to be less than 100 she should decrease her insulin down to 10 units.   Patient will call back in 2 weeks with a blood sugar update. Restart torsemide at 40 mg daily and continue with potassium supplementation. Patient was instructed to restart Coumadin at the normal dose and call back pro time levels in 1 week. We discussed that with her atrial fibrillation persistent at this time that she needs to maintain long-term anticoagulation. Patient received counseling on the following healthy behaviors: nutrition and exercise     Patient given educational materials     Health maintenance updated    Discussed use, benefit, and side effects of prescribed medications. Barriers to medication compliance addressed. All patient questions answered. Pt voiced understanding. Patient needs RTC in 1 month. Medical decision making of moderate complexity. Please note that this chart was generated using Dragon dictation software. Although every effort was made to ensure the accuracy of this automated transcription, some errors in transcription may have occurred.

## 2023-02-14 NOTE — TELEPHONE ENCOUNTER
Pt is in the office right now for an appt.  Provider will discuss results with patient once in the room

## 2023-02-15 PROBLEM — R29.898 RIGHT LEG WEAKNESS: Status: RESOLVED | Noted: 2023-01-06 | Resolved: 2023-02-15

## 2023-02-15 PROBLEM — R19.7 DIARRHEA: Status: RESOLVED | Noted: 2023-01-06 | Resolved: 2023-02-15

## 2023-02-16 ENCOUNTER — TELEPHONE (OUTPATIENT)
Dept: FAMILY MEDICINE CLINIC | Age: 78
End: 2023-02-16

## 2023-02-16 ENCOUNTER — OFFICE VISIT (OUTPATIENT)
Dept: ORTHOPEDIC SURGERY | Age: 78
End: 2023-02-16
Payer: MEDICARE

## 2023-02-16 VITALS — BODY MASS INDEX: 31.98 KG/M2 | HEIGHT: 66 IN | RESPIRATION RATE: 16 BRPM | WEIGHT: 199 LBS

## 2023-02-16 DIAGNOSIS — S52.591A OTHER CLOSED FRACTURE OF DISTAL END OF RIGHT RADIUS, INITIAL ENCOUNTER: Primary | ICD-10-CM

## 2023-02-16 PROCEDURE — 99213 OFFICE O/P EST LOW 20 MIN: CPT | Performed by: STUDENT IN AN ORGANIZED HEALTH CARE EDUCATION/TRAINING PROGRAM

## 2023-02-16 PROCEDURE — 1123F ACP DISCUSS/DSCN MKR DOCD: CPT | Performed by: STUDENT IN AN ORGANIZED HEALTH CARE EDUCATION/TRAINING PROGRAM

## 2023-02-16 NOTE — LETTER
[facility]  [address] [address]  Phone: [phone]  Fax: [phone]    Bud Rodrigues        February 16, 2023     Patient: Leticia Ch   YOB: 1945   Date of Visit: 2/16/2023       To Whom It May Concern:    Leticia Ch is requesting a lift chair for the following medical condition:    ICD-10-CM    1. Other closed fracture of distal end of right radius, initial encounter  S52.591A         Body Part: Right Wrist  Length of Need: 3 months  Parameters: N/A       If you have any questions or concerns, please don't hesitate to call.     Sincerely,      BRITTNEE Rodrigues, MICHAEL

## 2023-02-16 NOTE — PROGRESS NOTES
CHIEF COMPLAINT: Right wrist pain    DATE OF INJURY: 23    History:   Ms. August 77 y.o. right handed female presents today for the first visit for evaluation of a right wrist pain / injury.  The patient was referred by [facility] ED. This is evaluated as a personal injury. The pain began 2 days ago.  She rates pain at 9/10.  There was a history of injury. She fell backwards and tried to catch herself on an outstretched hand. She presented to the ED yesterday where Xrays were taken and she was splinted and asked to follow up with orthopedics.   The patient has not taken NSAIDs. The patient's occupation is retired.    Interval history: The patient is 4 weeks out from injury. She presented to the ED one week ago due to increased pain and swelling from her cast being too tight. They removed the cast without consulting ortho prior. She had brisk cap refill prior to removing the cast. She states the pain went down after cast removal. The swelling in her hand went down significantly yesterday. Her pain is better today. Rates pain 3/10. She has been in the wrist brace.       Past Medical History:   Diagnosis Date    Asthma     Atrial fibrillation (HCC)     Eosinophilia     Hemoptysis     HIGH CHOLESTEROL     Hx of blood clots     Hypertension     Irregular heart beat     Other specified gastritis without mention of hemorrhage     Palpitations     Skin cancer     basal and squamous    Type II or unspecified type diabetes mellitus without mention of complication, not stated as uncontrolled        Past Surgical History:   Procedure Laterality Date    BRONCHOSCOPY  2016    Dr. Melton - brushings from RLL    CARDIAC CATHETERIZATION  2018    Dr. Noel    CARDIAC CATHETERIZATION   and 5/3 2021    Cardiac cath, cardioversion and rafat     SECTION      CHOLECYSTECTOMY      COLONOSCOPY  2017    Dr. Kearns - sigmoid diverticulosis, polypectomies x3    COLONOSCOPY  2014      Urmila - sigmoid diverticulosis, polypectomies x3, internal hemorrhoids    COLONOSCOPY  10/10/2018    w/biopsy performed by Julisa Cruz MD at [facility] N/A 8/7/2020    COLONOSCOPY DIAGNOSTIC performed by Humble Aguilar MD at [address] [place] [address]  08/21/2018    Dr. Cherise Mariano - x3 (LIMA-LAD, L SV-D1-PLV) modified BL MAZE procedure w/obliteration of WAYNE using 45mm AtriClip    INSERTABLE Waynard Baba Left 08/16/2018    Dr. Janes Van - Elfego Escobedo SN# [id number] [employer]    IR KYPHOPLASTY THORACIC FIRST LEVEL  12/8/2022    IR KYPHOPLASTY THORACIC FIRST LEVEL 12/8/2022 [facility] SPECIAL PROCEDURES    MITRAL VALVE REPLACEMENT  08/21/2018    Dr. Cherise aMriano - 27mm [employer] Cinch tissue valve    PAIN MANAGEMENT PROCEDURE N/A 12/18/2020    C6-C7 MIDLINE  EPIDURAL STEROID INJECTION WITH FLUOROSCOPY performed by Adelia Matthews MD at [address] [place] [address] Bilateral 1/18/2021    BILATERAL T11 TRANSFORAMINAL EPIDURAL STEROID INJECTION WITH FLUOROSCOPY performed by Adelia Matthews MD at [facility]      TRANSESOPHAGEAL ECHOCARDIOGRAM  08/21/2018    during CABG/MVR    TUNNELED VENOUS CATHETER PLACEMENT Left 08/23/2018    Dr. Tomer Miles -  for HD---since removed    UPPER GASTROINTESTINAL ENDOSCOPY N/A 10/10/2018    w/biopsy performed by Julisa Cruz MD at [address] [place] [address]       Current Outpatient Medications on File Prior to Visit   Medication Sig Dispense Refill    torsemide (DEMADEX) 20 MG tablet Take 2 tablets by mouth daily 90 tablet 1    carbamide peroxide (DEBROX) 6.5 % otic solution Place 5 drops into the left ear daily 15 mL 0    metOLazone (ZAROXOLYN) 2.5 MG tablet TAKE 1 TABLET ON TUESDAY AND FRIDAY AND AS DIRECTED 30 tablet 1    montelukast (SINGULAIR) 10 MG tablet TAKE 1 TABLET NIGHTLY 90 tablet 1    metoprolol succinate (TOPROL XL) 50 MG extended release tablet TAKE 1 TABLET DAILY 90 tablet 1    potassium chloride (KLOR-CON M) 10 MEQ extended release tablet TAKE 2 TABLET DAILY 180 tablet 1    oxyCODONE HCl (OXY-IR) 10 MG immediate release tablet Take 1 tablet by mouth 3 times daily as needed for Pain for up to 20 days. Max Daily Amount: 30 mg 20 tablet 0    metoclopramide (REGLAN) 5 MG tablet Take 1 tablet by mouth 2 times daily (with meals) 60 tablet 3    levothyroxine (SYNTHROID) 175 MCG tablet Take 0.5 tablets by mouth Daily 30 tablet 3    nystatin (MYCOSTATIN) 498499 UNIT/ML suspension Take 5 mLs by mouth 4 times daily 100 mL 0    insulin glargine (LANTUS SOLOSTAR) 100 UNIT/ML injection pen Inject 16 Units into the skin every morning (Patient taking differently: Inject 10-15 Units into the skin every morning) 90 mL 1    midodrine (PROAMATINE) 2.5 MG tablet Take 1 tablet by mouth 3 times daily 90 tablet 2    vitamin D (ERGOCALCIFEROL) 1.25 MG (64476 UT) CAPS capsule TAKE ONE CAPSULE BY MOUTH ONCE WEEKLY 12 capsule 1    calcitRIOL (ROCALTROL) 0.25 MCG capsule 1 tablet Monday, Wednesday and Friday (Patient taking differently: 0.25 mcg 1 tablet 6 days, skipping Tuesdays) 30 capsule 3    blood glucose test strips (FREESTYLE LITE) strip USE TO TEST BLOOD SUGAR FOUR TIMES A  strip 3    warfarin (COUMADIN) 5 MG tablet TAKE 1 TABLET DAILY (Patient taking differently: Review INR prior to administration.   Managed by PCP) 100 tablet 3    insulin lispro, 1 Unit Dial, (HUMALOG KWIKPEN) 100 UNIT/ML SOPN USE SLIDING SCALE, 140-199, 2 UNITS, 200-249, 3 UNITS, 250-300, 4 UNITS, GREATER THAN 300, INJECT 5 UNITS 15 mL 2    amiodarone (CORDARONE) 200 MG tablet TAKE 1 TABLET DAILY (Patient taking differently: Take 100 mg by mouth daily) 60 tablet 5    albuterol sulfate  (90 Base) MCG/ACT inhaler USE 2 INHALATIONS EVERY 6 HOURS AS NEEDED FOR WHEEZING 25.5 g 2    FreeStyle Lancets MISC 1 each by Does not apply route 4 times daily 200 each 5    Blood Glucose Monitoring Suppl (FREESTYLE LITE) GAETANO 1 Device by Does not apply route 4 times daily      Insulin Pen Needle (BD PEN NEEDLE JANNET U/F) 32G X 4 MM MISC USE 1 PEN NEEDLE FOUR TIMES A  each 3    ACETAMINOPHEN PO Take 500 mg by mouth every 6 hours as needed       polyethylene glycol (GLYCOLAX) 17 GM/SCOOP powder Take 17 g by mouth as needed      omeprazole (PRILOSEC) 20 MG delayed release capsule Take 20 mg by mouth daily      ondansetron (ZOFRAN) 4 MG tablet Take 1 tablet by mouth 3 times daily as needed for Nausea or Vomiting 30 tablet 0    aspirin 81 MG chewable tablet Take 1 tablet by mouth daily 30 tablet 3    vitamin B-12 500 MCG tablet Take 1 tablet by mouth daily 30 tablet 3     No current facility-administered medications on file prior to visit. Allergies   Allergen Reactions    Jbiobidb-Jjmgkwu-Rotskr [Fluocinolone] Shortness Of Breath    Ciprofloxacin Shortness Of Breath    Diovan [Valsartan] Shortness Of Breath    Flagyl [Metronidazole] Shortness Of Breath     Has taken diflucan at home 12/7/15    Metformin And Related [Metformin And Related] Shortness Of Breath    Benazepril      Other reaction(s): Not Recorded    Morphine      Bad reaction. \"makes her feel horrible\".      Saxagliptin      Other reaction(s): Not Recorded    Levaquin [Levofloxacin] Rash       Social History     Socioeconomic History    Marital status:      Spouse name: Not on file    Number of children: Not on file    Years of education: Not on file    Highest education level: Not on file   Occupational History    Not on file   Tobacco Use    Smoking status: Never    Smokeless tobacco: Never   Vaping Use    Vaping Use: Never used   Substance and Sexual Activity    Alcohol use: No    Drug use: No    Sexual activity: Not on file   Other Topics Concern    Not on file   Social History Narrative    Not on file     Social Determinants of Health     Financial Resource Strain: Low Risk     Difficulty of Paying Living Expenses: Not hard at all   Food Insecurity: No Food Insecurity    Worried About [address] in the Last Year: Never true    [address] in the Last Year: Never true   Transportation Needs: Unknown    Lack of Transportation (Medical): Not on file    Lack of Transportation (Non-Medical): No   Physical Activity: Not on file   Stress: Not on file   Social Connections: Not on file   Intimate Partner Violence: Not on file   Housing Stability: Unknown    Unable to Pay for Housing in the Last Year: Not on file    Number of Places Lived in the Last Year: Not on file    Unstable Housing in the Last Year: No       Family History   Problem Relation Age of Onset    Cancer Father     Asthma Mother     Hypertension Mother     Heart Disease Mother     High Blood Pressure Mother        Review of Systems:  I have reviewed the clinically relevant past medical history, medications, allergies, family history, social history, and 13 point Review of Systems from the patient's recent history form & documented any details relevant to today's presenting complaints in the history above. The patient's self-reported past medical history, medications, allergies, family history, social history, and Review of Systems form from 1/19/23 have been scanned into the chart under the \"Media\" tab. Physical Examination:     Ms. Jamar Moss is a pleasant 69 y.o. female who presents today in no acute distress, awake, alert, and oriented. Vitals:    02/16/23 0843   Resp: 16         There is swelling that can be seen, no change in the color. She has intact sensation to light touch throughout the bilateral hands in the median, radial, and ulnar nerve distribution and good radial pulses. EPL/FPL/Interossei are intact bilaterally. She is exquisitely tender at the distal radius. No ulnar styloid tenderness or carpal tenderness. Her range of motion is limited secondary to pain. Strength limited secondary to pain. There is still moderate to severe edema of the hand and fingers.        IMAGING:  Right wrist Xray's:  3 views taken at the ED demonstrating   There is diffuse osteopenia. There may be a subtle fracture of the distal   radial metaphysis. There is diffuse degenerative joint disease. There is   chondrocalcinosis involving the triangular fibrocartilage. There are   vascular calcifications. There is soft tissue swelling. Reviewed Xrays taken at the ED on 2/7/23:   No change in the subacute, comminuted depressed intra-articular fracture   involving the distal radial metaphysis. Scapholunate distance is top-normal   in size. There is no dislocation. The bones are demineralized. There are   no bony destructive lesions. There is moderate polyarticular osteoarthritis. Chondrocalcinosis involves the triangular fibrocartilage complex. Vascular   calcifications are noted. Assessment:     Right wrist  distal radius fracture  - no change in alignment  Afib  Hx of blood clots     Plan:     Natural history and expected course discussed. Questions answered. She can remain in the brace. Non weight bearing for 2 more weeks. Tylenol as needed. Follow up in 2 weeks. We will repeat Xrays out of the cast.                 Dee Myers PA-C  Board Certified by the [employer] on Certification of [address]: This note was generated with use of a verbal recognition program and an attempt was made to check for errors. It is possible that there are still dictated errors within this office note. If so, please bring any significant errors to my attention for an addendum. All efforts were made to ensure that this office note is accurate.

## 2023-02-22 ENCOUNTER — OFFICE VISIT (OUTPATIENT)
Dept: FAMILY MEDICINE CLINIC | Age: 78
End: 2023-02-22

## 2023-02-22 VITALS
HEIGHT: 66 IN | WEIGHT: 188 LBS | DIASTOLIC BLOOD PRESSURE: 80 MMHG | BODY MASS INDEX: 30.22 KG/M2 | SYSTOLIC BLOOD PRESSURE: 122 MMHG

## 2023-02-22 DIAGNOSIS — H61.22 IMPACTED CERUMEN OF LEFT EAR: Primary | ICD-10-CM

## 2023-02-22 NOTE — PROGRESS NOTES
SUBJECTIVE:    Matthew Andino is a 69 y.o. female who presents for a follow up visit. Chief Complaint   Patient presents with    Cerumen Impaction     Left ear wax        Ear Fullness   There is pain in the left ear. This is a chronic problem. The current episode started more than 1 month ago. The problem occurs constantly. There has been no fever. The pain is mild. Associated symptoms include hearing loss. She has tried ear drops for the symptoms. The treatment provided no relief. Patient's medications, allergies, past medical,surgical, social and family histories were reviewed and updated as appropriate.      Past Medical History:   Diagnosis Date    Asthma     Atrial fibrillation (HCC)     Eosinophilia     Hemoptysis     HIGH CHOLESTEROL     Hx of blood clots     Hypertension     Irregular heart beat     Other specified gastritis without mention of hemorrhage     Palpitations     Skin cancer     basal and squamous    Type II or unspecified type diabetes mellitus without mention of complication, not stated as uncontrolled      Past Surgical History:   Procedure Laterality Date    BRONCHOSCOPY  2016    Dr. Karla Velazquez - brushings from [facility]  2018    Dr. Kamran Verdin and 5/3 2021    Cardiac cath, cardioversion and rafat     SECTION      CHOLECYSTECTOMY      COLONOSCOPY  2017    Dr. Back Members - sigmoid diverticulosis, polypectomies x3    COLONOSCOPY  2014    Dr. Back Members - sigmoid diverticulosis, polypectomies x3, internal hemorrhoids    COLONOSCOPY  10/10/2018    w/biopsy performed by La Colon MD at [facility] N/A 2020    COLONOSCOPY DIAGNOSTIC performed by Karla Velazquez MD at [address] [facility]  2018    Dr. Kamryn Delgado - x3 (LIMA-LAD, L SV-D1-PLV) modified BL MAZE procedure w/obliteration of WAYNE using 45mm AtriClip    INSERTABLE CARDIAC MONITOR Left 2018 Dr. Navarro. Attar - Reveal LINQ SN# [id number] Medtronic    IR KYPHOPLASTY THORACIC FIRST LEVEL  12/8/2022    IR KYPHOPLASTY THORACIC FIRST LEVEL 12/8/2022 [facility] PROCED[facility]    MITRAL VALVE REPLACEMENT  08/21/2018    Dr. Gerson Wallis - 27mm Medtronic Cinch tissue valve    PAIN MANAGEMENT PROCEDURE N/A 12/18/2020    C6-C7 MIDLINE  EPIDURAL STEROID INJECTION WITH FLUOROSCOPY performed by Tone Sams MD at [address] Bilateral 1/18/2021    BILATERAL T11 TRANSFORAMINAL EPIDURAL STEROID INJECTION WITH FLUOROSCOPY performed by Tone Sams MD at [facility]      TRANSESOPHAGEAL ECHOCARDIOGRAM  08/21/2018    during CABG/MVR    TUNNELED VENOUS CATHETER PLACEMENT Left 08/23/2018    Dr. Eden Angel for HD---since removed    UPPER GASTROINTESTINAL ENDOSCOPY N/A 10/10/2018    w/biopsy performed by Good Nathan MD at [address] [place] [address]     Family History   Problem Relation Age of Onset    Cancer Father     Asthma Mother     Hypertension Mother     Heart Disease Mother     High Blood Pressure Mother      Social History     Tobacco Use    Smoking status: Never    Smokeless tobacco: Never   Substance Use Topics    Alcohol use: No      Allergies   Allergen Reactions    Aqmmikwr-Cxtznch-Lxstnd [Fluocinolone] Shortness Of Breath    Ciprofloxacin Shortness Of Breath    Diovan [Valsartan] Shortness Of Breath    Flagyl [Metronidazole] Shortness Of Breath     Has taken diflucan at home 12/7/15    Metformin And Related [Metformin And Related] Shortness Of Breath    Benazepril      Other reaction(s): Not Recorded    Morphine      Bad reaction. \"makes her feel horrible\".      Saxagliptin      Other reaction(s): Not Recorded    Levaquin [Levofloxacin] Rash     Current Outpatient Medications on File Prior to Visit   Medication Sig Dispense Refill    torsemide (DEMADEX) 20 MG tablet Take 2 tablets by mouth daily 90 tablet 1    carbamide peroxide (DEBROX) 6.5 % otic solution Place 5 drops into the left ear daily 15 mL 0    metOLazone (ZAROXOLYN) 2.5 MG tablet TAKE 1 TABLET ON TUESDAY AND FRIDAY AND AS DIRECTED 30 tablet 1    montelukast (SINGULAIR) 10 MG tablet TAKE 1 TABLET NIGHTLY 90 tablet 1    metoprolol succinate (TOPROL XL) 50 MG extended release tablet TAKE 1 TABLET DAILY 90 tablet 1    potassium chloride (KLOR-CON M) 10 MEQ extended release tablet TAKE 2 TABLET DAILY 180 tablet 1    oxyCODONE HCl (OXY-IR) 10 MG immediate release tablet Take 1 tablet by mouth 3 times daily as needed for Pain for up to 20 days. Max Daily Amount: 30 mg 20 tablet 0    metoclopramide (REGLAN) 5 MG tablet Take 1 tablet by mouth 2 times daily (with meals) 60 tablet 3    levothyroxine (SYNTHROID) 175 MCG tablet Take 0.5 tablets by mouth Daily 30 tablet 3    nystatin (MYCOSTATIN) 267489 UNIT/ML suspension Take 5 mLs by mouth 4 times daily 100 mL 0    insulin glargine (LANTUS SOLOSTAR) 100 UNIT/ML injection pen Inject 16 Units into the skin every morning (Patient taking differently: Inject 10-15 Units into the skin every morning) 90 mL 1    midodrine (PROAMATINE) 2.5 MG tablet Take 1 tablet by mouth 3 times daily 90 tablet 2    vitamin D (ERGOCALCIFEROL) 1.25 MG (41718 UT) CAPS capsule TAKE ONE CAPSULE BY MOUTH ONCE WEEKLY 12 capsule 1    calcitRIOL (ROCALTROL) 0.25 MCG capsule 1 tablet Monday, Wednesday and Friday (Patient taking differently: 0.25 mcg 1 tablet 6 days, skipping Tuesdays) 30 capsule 3    blood glucose test strips (FREESTYLE LITE) strip USE TO TEST BLOOD SUGAR FOUR TIMES A  strip 3    warfarin (COUMADIN) 5 MG tablet TAKE 1 TABLET DAILY (Patient taking differently: Review INR prior to administration.   Managed by PCP) 100 tablet 3    insulin lispro, 1 Unit Dial, (HUMALOG KWIKPEN) 100 UNIT/ML SOPN USE SLIDING SCALE, 140-199, 2 UNITS, 200-249, 3 UNITS, 250-300, 4 UNITS, GREATER THAN 300, INJECT 5 UNITS 15 mL 2    amiodarone (CORDARONE) 200 MG tablet TAKE 1 TABLET DAILY (Patient taking differently: Take 100 mg by mouth daily) 60 tablet 5    albuterol sulfate  (90 Base) MCG/ACT inhaler USE 2 INHALATIONS EVERY 6 HOURS AS NEEDED FOR WHEEZING 25.5 g 2    FreeStyle Lancets MISC 1 each by Does not apply route 4 times daily 200 each 5    Blood Glucose Monitoring Suppl (FREESTYLE LITE) GAETANO 1 Device by Does not apply route 4 times daily      Insulin Pen Needle (BD PEN NEEDLE JANNET U/F) 32G X 4 MM MISC USE 1 PEN NEEDLE FOUR TIMES A  each 3    ACETAMINOPHEN PO Take 500 mg by mouth every 6 hours as needed       polyethylene glycol (GLYCOLAX) 17 GM/SCOOP powder Take 17 g by mouth as needed      omeprazole (PRILOSEC) 20 MG delayed release capsule Take 20 mg by mouth daily      ondansetron (ZOFRAN) 4 MG tablet Take 1 tablet by mouth 3 times daily as needed for Nausea or Vomiting 30 tablet 0    aspirin 81 MG chewable tablet Take 1 tablet by mouth daily 30 tablet 3    vitamin B-12 500 MCG tablet Take 1 tablet by mouth daily 30 tablet 3     No current facility-administered medications on file prior to visit. Review of Systems   HENT:  Positive for hearing loss. OBJECTIVE:    /80   Ht 5' 6\" (1.676 m)   Wt 188 lb (85.3 kg)   BMI 30.34 kg/m²    Physical Exam  Constitutional:       Appearance: She is well-developed. HENT:      Head: Normocephalic and atraumatic. Right Ear: Tympanic membrane and external ear normal.      Left Ear: Tympanic membrane and external ear normal.      Ears:      Comments: Large amount of cerumen is removed from the left ear with irrigation by Rui Soto LPN. Patient states that she no longer has decrease in hearing and denies any pain at this time. Nose: Nose normal.   Eyes:      General:         Right eye: No discharge. Conjunctiva/sclera: Conjunctivae normal.   Neck:      Thyroid: No thyromegaly. Vascular: No JVD. Trachea: No tracheal deviation. Cardiovascular:      Rate and Rhythm: Normal rate and regular rhythm. Heart sounds: Normal heart sounds. Pulmonary:      Effort: Pulmonary effort is normal. No respiratory distress. Breath sounds: Normal breath sounds. No rales. Musculoskeletal:      Cervical back: Normal range of motion and neck supple. Lymphadenopathy:      Cervical: No cervical adenopathy. Skin:     General: Skin is warm and dry. Neurological:      Mental Status: She is alert and oriented to person, place, and time. Psychiatric:         Mood and Affect: Mood normal.         Behavior: Behavior normal.       ASSESSMENT/PLAN:    Lyn Conroy was seen today for cerumen impaction. Diagnoses and all orders for this visit:    Impacted cerumen of left ear    Cerumen removed with irrigation by Alexandre Espinosa LPN. Return for regularly scheduled follow-up. Please note portions of this note were completed with a voicerecognition program.  Efforts were made to edit the dictations but occasionally words are mis-transcribed.

## 2023-02-22 NOTE — PROGRESS NOTES
Ear Irrigation Procedure Note    Ear irrigation procedure was performed using a WaterPik / Elephant ear to the left ear(s) for cerumen impaction. The remaining wax and debris was removed with curette  instrumentation. The procedure was successful.   The patient had no complications

## 2023-02-23 DIAGNOSIS — R06.02 SOB (SHORTNESS OF BREATH): ICD-10-CM

## 2023-02-23 DIAGNOSIS — I15.9 SECONDARY HYPERTENSION: ICD-10-CM

## 2023-02-23 LAB
ANION GAP SERPL CALCULATED.3IONS-SCNC: 14 MMOL/L (ref 3–16)
BUN BLDV-MCNC: 37 MG/DL (ref 7–20)
CALCIUM SERPL-MCNC: 9.1 MG/DL (ref 8.3–10.6)
CHLORIDE BLD-SCNC: 98 MMOL/L (ref 99–110)
CO2: 30 MMOL/L (ref 21–32)
CREAT SERPL-MCNC: 1.6 MG/DL (ref 0.6–1.2)
GFR SERPL CREATININE-BSD FRML MDRD: 33 ML/MIN/{1.73_M2}
GLUCOSE BLD-MCNC: 119 MG/DL (ref 70–99)
POTASSIUM SERPL-SCNC: 3.3 MMOL/L (ref 3.5–5.1)
PRO-BNP: 8557 PG/ML (ref 0–449)
SODIUM BLD-SCNC: 142 MMOL/L (ref 136–145)

## 2023-02-24 ENCOUNTER — TELEPHONE (OUTPATIENT)
Dept: FAMILY MEDICINE CLINIC | Age: 78
End: 2023-02-24

## 2023-02-24 ENCOUNTER — ANTI-COAG VISIT (OUTPATIENT)
Dept: FAMILY MEDICINE CLINIC | Age: 78
End: 2023-02-24

## 2023-02-24 ENCOUNTER — TELEPHONE (OUTPATIENT)
Dept: CARDIOLOGY CLINIC | Age: 78
End: 2023-02-24

## 2023-02-24 DIAGNOSIS — R06.02 SOB (SHORTNESS OF BREATH): ICD-10-CM

## 2023-02-24 DIAGNOSIS — I15.9 SECONDARY HYPERTENSION: Primary | ICD-10-CM

## 2023-02-24 LAB — INR BLD: 1.8

## 2023-02-24 NOTE — TELEPHONE ENCOUNTER
Called pt to advise that her INR result was 1.8 and per WM to continue wuth 5 mg 6 days a week and 2.5 mg 1 day a week.     Pt's  verbalized understanding

## 2023-02-27 DIAGNOSIS — M17.0 PRIMARY OSTEOARTHRITIS OF BOTH KNEES: ICD-10-CM

## 2023-02-27 RX ORDER — OXYCODONE HYDROCHLORIDE 5 MG/1
5 TABLET ORAL 3 TIMES DAILY PRN
Qty: 90 TABLET | Refills: 0 | Status: SHIPPED | OUTPATIENT
Start: 2023-02-27 | End: 2023-03-29

## 2023-03-02 ENCOUNTER — OFFICE VISIT (OUTPATIENT)
Dept: ORTHOPEDIC SURGERY | Age: 78
End: 2023-03-02
Payer: MEDICARE

## 2023-03-02 DIAGNOSIS — S52.591A OTHER CLOSED FRACTURE OF DISTAL END OF RIGHT RADIUS, INITIAL ENCOUNTER: Primary | ICD-10-CM

## 2023-03-02 PROCEDURE — 99213 OFFICE O/P EST LOW 20 MIN: CPT | Performed by: STUDENT IN AN ORGANIZED HEALTH CARE EDUCATION/TRAINING PROGRAM

## 2023-03-02 PROCEDURE — 1123F ACP DISCUSS/DSCN MKR DOCD: CPT | Performed by: STUDENT IN AN ORGANIZED HEALTH CARE EDUCATION/TRAINING PROGRAM

## 2023-03-02 NOTE — PROGRESS NOTES
CHIEF COMPLAINT: Right wrist pain    DATE OF INJURY: 23    History:   Ms. August 77 y.o. right handed female presents today for the first visit for evaluation of a right wrist pain / injury.  The patient was referred by [facility] ED. This is evaluated as a personal injury. The pain began 2 days ago.  She rates pain at 9/10.  There was a history of injury. She fell backwards and tried to catch herself on an outstretched hand. She presented to the ED yesterday where Xrays were taken and she was splinted and asked to follow up with orthopedics.   The patient has not taken NSAIDs. The patient's occupation is retired.    Interval history: The patient is now 6 weeks out from injury.Her pain is better today. Rates pain 2/10. She has been in the wrist brace. She has soreness with use of her right arm.       Past Medical History:   Diagnosis Date    Asthma     Atrial fibrillation (HCC)     Eosinophilia     Hemoptysis     HIGH CHOLESTEROL     Hx of blood clots     Hypertension     Irregular heart beat     Other specified gastritis without mention of hemorrhage     Palpitations     Skin cancer     basal and squamous    Type II or unspecified type diabetes mellitus without mention of complication, not stated as uncontrolled        Past Surgical History:   Procedure Laterality Date    BRONCHOSCOPY  2016    Dr. Melton - brushings from [facility]    CARDIAC CATHETERIZATION  2018    Dr. Noel    CARDIAC CATHETERIZATION   and 5/3 2021    Cardiac cath, cardioversion and rafat     SECTION      CHOLECYSTECTOMY      COLONOSCOPY  2017    Dr. Kearns - sigmoid diverticulosis, polypectomies x3    COLONOSCOPY  2014    Dr. Kearns - sigmoid diverticulosis, polypectomies x3, internal hemorrhoids    COLONOSCOPY  10/10/2018    w/biopsy performed by Sudheer Zepeda MD at [facility] ENDOSCOPY    COLONOSCOPY N/A 2020    COLONOSCOPY DIAGNOSTIC performed by Cayetano Franklin MD at [facility] ENDOSCOPY     CORONARY ARTERY BYPASS GRAFT  08/21/2018    Dr. Levar Ryan - x3 (LIMA-LAD, L SV-D1-PLV) modified BL MAZE procedure w/obliteration of WAYNE using 45mm AtriClip    INSERTABLE CARDIAC MONITOR Left 08/16/2018    Dr. Darrelyn Ormond - Baylee Blade SN# [id number] Medtronic    IR KYPHOPLASTY THORACIC FIRST LEVEL  12/8/2022    IR KYPHOPLASTY THORACIC FIRST LEVEL 12/8/2022 [facility] SPECIAL PROCEDURES    MITRAL VALVE REPLACEMENT  08/21/2018    Dr. Levar Ryan - 27mm Medtronic Cinch tissue valve    PAIN MANAGEMENT PROCEDURE N/A 12/18/2020    C6-C7 MIDLINE  EPIDURAL STEROID INJECTION WITH FLUOROSCOPY performed by Lui Iqbal MD at [address] [place] [address] Bilateral 1/18/2021    BILATERAL T11 TRANSFORAMINAL EPIDURAL STEROID INJECTION WITH FLUOROSCOPY performed by Lui Iqbal MD at [facility]      TRANSESOPHAGEAL ECHOCARDIOGRAM  08/21/2018    during CABG/MVR    TUNNELED VENOUS CATHETER PLACEMENT Left 08/23/2018    Dr. Jovita James - IJ for HD---since removed    UPPER GASTROINTESTINAL ENDOSCOPY N/A 10/10/2018    w/biopsy performed by Ash Camacho MD at [address] [place] [address]       Current Outpatient Medications on File Prior to Visit   Medication Sig Dispense Refill    oxyCODONE (ROXICODONE) 5 MG immediate release tablet Take 1 tablet by mouth 3 times daily as needed for Pain for up to 30 days.  90 tablet 0    torsemide (DEMADEX) 20 MG tablet Take 2 tablets by mouth daily 90 tablet 1    carbamide peroxide (DEBROX) 6.5 % otic solution Place 5 drops into the left ear daily 15 mL 0    metOLazone (ZAROXOLYN) 2.5 MG tablet TAKE 1 TABLET ON TUESDAY AND FRIDAY AND AS DIRECTED 30 tablet 1    montelukast (SINGULAIR) 10 MG tablet TAKE 1 TABLET NIGHTLY 90 tablet 1    metoprolol succinate (TOPROL XL) 50 MG extended release tablet TAKE 1 TABLET DAILY 90 tablet 1    potassium chloride (KLOR-CON M) 10 MEQ extended release tablet TAKE 2 TABLET DAILY 180 tablet 1    metoclopramide (REGLAN) 5 MG tablet Take 1 tablet by mouth 2 times daily (with meals) 60 tablet 3    levothyroxine (SYNTHROID) 175 MCG tablet Take 0.5 tablets by mouth Daily 30 tablet 3    nystatin (MYCOSTATIN) 552897 UNIT/ML suspension Take 5 mLs by mouth 4 times daily 100 mL 0    insulin glargine (LANTUS SOLOSTAR) 100 UNIT/ML injection pen Inject 16 Units into the skin every morning (Patient taking differently: Inject 10-15 Units into the skin every morning) 90 mL 1    midodrine (PROAMATINE) 2.5 MG tablet Take 1 tablet by mouth 3 times daily 90 tablet 2    vitamin D (ERGOCALCIFEROL) 1.25 MG (56274 UT) CAPS capsule TAKE ONE CAPSULE BY MOUTH ONCE WEEKLY 12 capsule 1    calcitRIOL (ROCALTROL) 0.25 MCG capsule 1 tablet Monday, Wednesday and Friday (Patient taking differently: 0.25 mcg 1 tablet 6 days, skipping Tuesdays) 30 capsule 3    blood glucose test strips (FREESTYLE LITE) strip USE TO TEST BLOOD SUGAR FOUR TIMES A  strip 3    warfarin (COUMADIN) 5 MG tablet TAKE 1 TABLET DAILY (Patient taking differently: Review INR prior to administration.   Managed by PCP) 100 tablet 3    insulin lispro, 1 Unit Dial, (HUMALOG KWIKPEN) 100 UNIT/ML SOPN USE SLIDING SCALE, 140-199, 2 UNITS, 200-249, 3 UNITS, 250-300, 4 UNITS, GREATER THAN 300, INJECT 5 UNITS 15 mL 2    amiodarone (CORDARONE) 200 MG tablet TAKE 1 TABLET DAILY (Patient taking differently: Take 100 mg by mouth daily) 60 tablet 5    albuterol sulfate  (90 Base) MCG/ACT inhaler USE 2 INHALATIONS EVERY 6 HOURS AS NEEDED FOR WHEEZING 25.5 g 2    FreeStyle Lancets MISC 1 each by Does not apply route 4 times daily 200 each 5    Blood Glucose Monitoring Suppl (FREESTYLE LITE) GAETANO 1 Device by Does not apply route 4 times daily      Insulin Pen Needle (BD PEN NEEDLE JANNET U/F) 32G X 4 MM MISC USE 1 PEN NEEDLE FOUR TIMES A  each 3    ACETAMINOPHEN PO Take 500 mg by mouth every 6 hours as needed       polyethylene glycol (GLYCOLAX) 17 GM/SCOOP powder Take 17 g by mouth as needed      omeprazole (PRILOSEC) 20 MG delayed release capsule Take 20 mg by mouth daily      ondansetron (ZOFRAN) 4 MG tablet Take 1 tablet by mouth 3 times daily as needed for Nausea or Vomiting 30 tablet 0    aspirin 81 MG chewable tablet Take 1 tablet by mouth daily 30 tablet 3    vitamin B-12 500 MCG tablet Take 1 tablet by mouth daily 30 tablet 3     No current facility-administered medications on file prior to visit. Allergies   Allergen Reactions    Hpdcywqg-Ttwefyi-Hbvzny [Fluocinolone] Shortness Of Breath    Ciprofloxacin Shortness Of Breath    Diovan [Valsartan] Shortness Of Breath    Flagyl [Metronidazole] Shortness Of Breath     Has taken diflucan at home 12/7/15    Metformin And Related [Metformin And Related] Shortness Of Breath    Benazepril      Other reaction(s): Not Recorded    Morphine      Bad reaction. \"makes her feel horrible\". Saxagliptin      Other reaction(s): Not Recorded    Levaquin [Levofloxacin] Rash       Social History     Socioeconomic History    Marital status:      Spouse name: Not on file    Number of children: Not on file    Years of education: Not on file    Highest education level: Not on file   Occupational History    Not on file   Tobacco Use    Smoking status: Never    Smokeless tobacco: Never   Vaping Use    Vaping Use: Never used   Substance and Sexual Activity    Alcohol use: No    Drug use: No    Sexual activity: Not on file   Other Topics Concern    Not on file   Social History Narrative    Not on file     Social Determinants of Health     Financial Resource Strain: Low Risk     Difficulty of Paying Living Expenses: Not hard at all   Food Insecurity: No Food Insecurity    Worried About Running Out of Food in the Last Year: Never true    [address] in the Last Year: Never true   Transportation Needs: Unknown    Lack of Transportation (Medical): Not on file    Lack of Transportation (Non-Medical):  No   Physical Activity: Not on file   Stress: Not on file   Social Connections: Not on file   Intimate Partner Violence: Not on file   Housing Stability: Unknown    Unable to Pay for Housing in the Last Year: Not on file    Number of Places Lived in the Last Year: Not on file    Unstable Housing in the Last Year: No       Family History   Problem Relation Age of Onset    Cancer Father     Asthma Mother     Hypertension Mother     Heart Disease Mother     High Blood Pressure Mother        Review of Systems:  I have reviewed the clinically relevant past medical history, medications, allergies, family history, social history, and 13 point Review of Systems from the patient's recent history form & documented any details relevant to today's presenting complaints in the history above. The patient's self-reported past medical history, medications, allergies, family history, social history, and Review of Systems form from 1/19/23 have been scanned into the chart under the \"Media\" tab. Physical Examination:     Ms. Emery Blake is a pleasant 69 y.o. female who presents today in no acute distress, awake, alert, and oriented. There were no vitals filed for this visit. There is swelling that can be seen, no change in the color. She has intact sensation to light touch throughout the bilateral hands in the median, radial, and ulnar nerve distribution and good radial pulses. EPL/FPL/Interossei are intact bilaterally. She is exquisitely tender at the distal radius. No ulnar styloid tenderness or carpal tenderness. Her range of motion is limited secondary to stiffness. Strength limited secondary to stiffness. There is mild edema of the hand and fingers. IMAGING:  Right wrist Xray's:  3 views taken at the ED demonstrating   There is diffuse osteopenia. There may be a subtle fracture of the distal   radial metaphysis. There is diffuse degenerative joint disease. There is   chondrocalcinosis involving the triangular fibrocartilage. There are   vascular calcifications. There is soft tissue swelling.      Reviewed Xrays taken at the ED on 2/7/23:   No change in the subacute, comminuted depressed intra-articular fracture   involving the distal radial metaphysis. Scapholunate distance is top-normal   in size. There is no dislocation. The bones are demineralized. There are   no bony destructive lesions. There is moderate polyarticular osteoarthritis. Chondrocalcinosis involves the triangular fibrocartilage complex. Vascular   calcifications are noted. Right wrist xrays 3 views obtained and reviewed today:No change in the subacute, comminuted depressed intra-articular fracture involving the distal radial metaphysis. There is moderate polyarticular osteoarthritis. Chondrocalcinosis involves the triangular fibrocartilage complex. Vascular  calcifications are noted. Assessment:     Right wrist  distal radius fracture  - no change in alignment  Afib  Hx of blood clots     Plan:     Natural history and expected course discussed. Questions answered. She can discontinue the brace as tolerated and begin weight bearing as tolerated. We did discuss wearing the brace as needed for lifting. I also discussed physical therapy however she defers at this time. Tylenol as needed. Follow up in 6 weeks. Jayshree Brewster PA-C  Board Certified by the [employer] on Certification of [address] and [address]: This note was generated with use of a verbal recognition program and an attempt was made to check for errors. It is possible that there are still dictated errors within this office note. If so, please bring any significant errors to my attention for an addendum. All efforts were made to ensure that this office note is accurate.

## 2023-03-06 ENCOUNTER — TELEPHONE (OUTPATIENT)
Dept: CARDIOLOGY CLINIC | Age: 78
End: 2023-03-06

## 2023-03-06 ENCOUNTER — NURSE ONLY (OUTPATIENT)
Dept: CARDIOLOGY CLINIC | Age: 78
End: 2023-03-06
Payer: MEDICARE

## 2023-03-06 DIAGNOSIS — I50.22 CHRONIC SYSTOLIC HF (HEART FAILURE) (HCC): ICD-10-CM

## 2023-03-06 DIAGNOSIS — Z95.810 ICD (IMPLANTABLE CARDIOVERTER-DEFIBRILLATOR) IN PLACE: ICD-10-CM

## 2023-03-06 DIAGNOSIS — I42.9 CARDIOMYOPATHY, UNSPECIFIED TYPE (HCC): Primary | ICD-10-CM

## 2023-03-06 PROCEDURE — 93297 REM INTERROG DEV EVAL ICPMS: CPT | Performed by: CLINICAL NURSE SPECIALIST

## 2023-03-06 PROCEDURE — G2066 INTER DEVC REMOTE 30D: HCPCS | Performed by: CLINICAL NURSE SPECIALIST

## 2023-03-06 RX ORDER — PREDNISONE 10 MG/1
TABLET ORAL
Qty: 100 TABLET | Refills: 0 | Status: SHIPPED | OUTPATIENT
Start: 2023-03-06

## 2023-03-06 NOTE — TELEPHONE ENCOUNTER
Medication:   Requested Prescriptions     Pending Prescriptions Disp Refills    predniSONE (DELTASONE) 10 MG tablet [Pharmacy Med Name: PREDNISONE TABS 10MG] 100 tablet      Sig: TAKE 1 TABLET AS NEEDED (EOSINOPHILIC GASTRITIS)      Last Filled:      Patient Phone Number: [phone] (home)     Last appt: 2/22/2023   Next appt: 3/17/2023    Last OARRS:   RX Monitoring 3/1/2019   Attestation The Prescription Monitoring Report for this patient was reviewed today.   Periodic Controlled Substance Monitoring -     PDMP Monitoring:    Last PDMP [place] as Reviewed ([place]):  Review User Review Instant Review Result   FAITH HAYNES 1/26/2023 10:49 AM Reviewed PDMP [1]     Preferred Pharmacy:   [facility] - [address] -  [phone] - F [phone]  [address] [place] [address]  Phone: [phone] Fax: [phone]    [id number] - [place], [place] - [address] - P [phone] - F [phone]  [address]  [facility] [address]  Phone: [phone] Fax: [phone]

## 2023-03-06 NOTE — PROGRESS NOTES
Remote transmission received from patient's dual chamber ICD home monitor. Transmission shows normal sensing and pacing function. NSVT noted (Toprol). Possible Optivol fluid accumulation 1/10/23 --- ongoing. Currently elevated around 200, above threshold, with correlating TI trend below reference line. TriageHF Heart Failure Risk Status on 06-Mar-2023 is High    NP will review. See interrogation under cardiology tab in the [address] [place] [address] [address] field for more details. Will continue to monitor remotely.     (End of 31-day monitoring period 3/6/23)

## 2023-03-06 NOTE — TELEPHONE ENCOUNTER
----- Message from SOLEDAD Hastings - CNS sent at 3/6/2023 12:06 PM EST -----  Optival is still elevated  Please call and see how she is feeling

## 2023-03-07 DIAGNOSIS — E11.69 DIABETES MELLITUS TYPE 2 IN OBESE (HCC): ICD-10-CM

## 2023-03-07 DIAGNOSIS — N18.32 TYPE 2 DIABETES MELLITUS WITH STAGE 3B CHRONIC KIDNEY DISEASE, WITH LONG-TERM CURRENT USE OF INSULIN (HCC): ICD-10-CM

## 2023-03-07 DIAGNOSIS — Z79.4 TYPE 2 DIABETES MELLITUS WITH STAGE 3B CHRONIC KIDNEY DISEASE, WITH LONG-TERM CURRENT USE OF INSULIN (HCC): ICD-10-CM

## 2023-03-07 DIAGNOSIS — E11.22 TYPE 2 DIABETES MELLITUS WITH STAGE 3B CHRONIC KIDNEY DISEASE, WITH LONG-TERM CURRENT USE OF INSULIN (HCC): ICD-10-CM

## 2023-03-07 DIAGNOSIS — E66.9 DIABETES MELLITUS TYPE 2 IN OBESE (HCC): ICD-10-CM

## 2023-03-07 RX ORDER — BLOOD-GLUCOSE METER
KIT MISCELLANEOUS
Qty: 200 STRIP | Refills: 3 | Status: SHIPPED | OUTPATIENT
Start: 2023-03-07

## 2023-03-07 RX ORDER — LEVOTHYROXINE SODIUM 88 UG/1
88 TABLET ORAL DAILY
Qty: 90 TABLET | Refills: 1 | Status: SHIPPED | OUTPATIENT
Start: 2023-03-07

## 2023-03-07 RX ORDER — INSULIN LISPRO 100 [IU]/ML
INJECTION, SOLUTION INTRAVENOUS; SUBCUTANEOUS
Qty: 15 ML | Refills: 2 | Status: SHIPPED | OUTPATIENT
Start: 2023-03-07

## 2023-03-07 RX ORDER — ERGOCALCIFEROL 1.25 MG/1
CAPSULE ORAL
Qty: 12 CAPSULE | Refills: 1 | Status: SHIPPED | OUTPATIENT
Start: 2023-03-07

## 2023-03-07 NOTE — TELEPHONE ENCOUNTER
Medication:   Requested Prescriptions     Pending Prescriptions Disp Refills    vitamin D (ERGOCALCIFEROL) 1.25 MG (11008 UT) CAPS capsule [Pharmacy Med Name: VITAMIN D2 1.25MG(50,000 UNIT)] 12 capsule 1     Sig: TAKE 1 CAPSULE BY MOUTH ONCE WEEKLY      Last Filled:      Patient Phone Number: [phone] (home)     Last appt: 2/14/2023  Next appt: 3/17/2023    Last OARRS:   RX Monitoring 3/1/2019   Attestation The Prescription Monitoring Report for this patient was reviewed today. Periodic Controlled Substance Monitoring -     PDMP Monitoring:    Last PDMP Kaila Hull as Reviewed [facility]):  Review User Review Instant Review Result   Meagan Jay 1/26/2023 10:49 AM Reviewed PDMP [1]     Preferred Pharmacy:   [address] [place] [address], [address] [phone] - f [phone]  [address] [place] [address] [facility] [address]  Phone: [phone] Fax: [phone]    [facility] [place] [address], [address] [place] [address] [phone] - f [phone]  [address] [place] [address] Rd.   Catherine Chen [address]  Phone: [phone] Fax: [phone]

## 2023-03-07 NOTE — TELEPHONE ENCOUNTER
Medication:   Requested Prescriptions     Pending Prescriptions Disp Refills    FREESTYLE LITE strip [Pharmacy Med Name: FREESTYLE LITE TEST STRIP] 200 strip 3     Sig: USE TO TEST BLOOD SUGAR FOUR TIMES A DAY      Last Filled:      Patient Phone Number: [phone] (home)     Last appt: 2/14/2023   Next appt: 3/17/2023    Last OARRS:   RX Monitoring 3/1/2019   Attestation The Prescription Monitoring Report for this patient was reviewed today. Periodic Controlled Substance Monitoring -     PDMP Monitoring:    Last PDMP Ernestine Lima as Reviewed [facility]):  Review User Review Instant Review Result   Dede Jones 1/26/2023 10:49 AM Reviewed PDMP [1]     Preferred Pharmacy:   [address] [place] [address], [address] [phone] - f [phone]  [address] [place] [address]  Phone: [phone] Fax: [phone]    [facility]/pharmacy [address] [place] [address] [phone] - f [phone]  [address] [place] [address] Rd.   Lise Freitas [address]  Phone: [phone] Fax: [phone]

## 2023-03-08 NOTE — TELEPHONE ENCOUNTER
Tried to reach patient no answer phone just rang and rang will try again later.   Spoke to patient and her spouse always sob, some swelling but not as bad as they have been, weight gain 4 to 5 pounds current weight 190.8, no new medications, torsemide 20 mg BID, Watching salt and fluid intake, no fast food

## 2023-03-10 ENCOUNTER — TELEPHONE (OUTPATIENT)
Dept: FAMILY MEDICINE CLINIC | Age: 78
End: 2023-03-10

## 2023-03-10 ENCOUNTER — ANTI-COAG VISIT (OUTPATIENT)
Dept: FAMILY MEDICINE CLINIC | Age: 78
End: 2023-03-10

## 2023-03-10 LAB — INR BLD: 1.6

## 2023-03-13 ENCOUNTER — TELEPHONE (OUTPATIENT)
Dept: PHARMACY | Facility: CLINIC | Age: 78
End: 2023-03-13

## 2023-03-13 NOTE — LETTER
[place]  [address], [address]        Ed Brandt   [address] [facility]  [place] [address]           03/17/23     Dear Ed Brandt,    We see you had a recent fracture. Your bone health is very important - did you know there are ways to measure your risk for future fractures? One way is a bone mineral density test (DEXA scan). A DEXA scan is a type of x-ray, done as an outpatient radiological test.  It is not painful and it takes a short time to perform. We encourage you to call your provider to ask about scheduling this test and further discussing your bone health. Enclosed are some educational materials about DEXA scans and calcium. If you have additional questions, please call us or your provider.     [address]  Phone: [phone], option 1

## 2023-03-13 NOTE — TELEPHONE ENCOUNTER
Re Varma MD, please see below, appt with you 3/17   - would patient benefit from DEXA due to recent possible fracture? No noted previous DEXA (appears may have declined DEXA in 2016?)     If appropriate, please order DEXA and have your staff notify patient, or review at upcoming appt. Thank you,  Nichole Kelly, PharmD, [facility]  Department, toll free: [phone], option 1    ==================================================================  POPULATION HEALTH CLINICAL PHARMACY REVIEW: RECENT FRACTURE    Panfilo Gamez is a 69 y.o. old female patient who recently had a SUBTLE/POSSIBLE fracture of distal radial metaphysis (1/18/23 ED visit, s/p fall). Lab Results   Component Value Date    VITD25 68.2 02/08/2023      Lab Results   Component Value Date    CALCIUM 9.1 02/23/2023    PHOS 3.2 01/30/2023     estimated creatinine clearance is 33 mL/min (A) (based on SCr of 1.6 mg/dL (H)).     DEXA:  None    FRAX-calculated 10-year fracture probability:   Per WHO calculator: major Osteoporotic = 23% and Hip = 6.8%    Assessment:   - AACE recommends BMD testing in adults who have a fracture at or after age 48; USPSTF and ACP recommend DEXA for women at or after age 72  69 y.o. female with recent possible fracture and may benefit from DEXA to assess current BMD  Appears may have declined DEXA in 2026    Considerations:  - Suggest obtaining DEXA

## 2023-03-15 ENCOUNTER — HOSPITAL ENCOUNTER (OUTPATIENT)
Dept: WOMENS IMAGING | Age: 78
Discharge: HOME | End: 2023-03-15
Payer: MEDICARE

## 2023-03-15 VITALS — HEIGHT: 66 IN | WEIGHT: 194 LBS | BODY MASS INDEX: 31.18 KG/M2

## 2023-03-15 DIAGNOSIS — I15.9 SECONDARY HYPERTENSION: ICD-10-CM

## 2023-03-15 DIAGNOSIS — Z12.31 VISIT FOR SCREENING MAMMOGRAM: ICD-10-CM

## 2023-03-15 DIAGNOSIS — R06.02 SOB (SHORTNESS OF BREATH): ICD-10-CM

## 2023-03-15 LAB
ANION GAP SERPL CALCULATED.3IONS-SCNC: 13 MMOL/L (ref 3–16)
BUN SERPL-MCNC: 29 MG/DL (ref 7–20)
CALCIUM SERPL-MCNC: 8.5 MG/DL (ref 8.3–10.6)
CHLORIDE SERPL-SCNC: 99 MMOL/L (ref 99–110)
CO2 SERPL-SCNC: 29 MMOL/L (ref 21–32)
CREAT SERPL-MCNC: 1.5 MG/DL (ref 0.6–1.2)
GFR SERPLBLD CREATININE-BSD FMLA CKD-EPI: 36 ML/MIN/{1.73_M2}
GLUCOSE SERPL-MCNC: 135 MG/DL (ref 70–99)
NT-PROBNP SERPL-MCNC: 8140 PG/ML (ref 0–449)
POTASSIUM SERPL-SCNC: 3.4 MMOL/L (ref 3.5–5.1)
SODIUM SERPL-SCNC: 141 MMOL/L (ref 136–145)

## 2023-03-15 PROCEDURE — 77063 BREAST TOMOSYNTHESIS BI: CPT

## 2023-03-16 ENCOUNTER — OFFICE VISIT (OUTPATIENT)
Dept: CARDIOLOGY CLINIC | Age: 78
End: 2023-03-16
Payer: MEDICARE

## 2023-03-16 VITALS
OXYGEN SATURATION: 91 % | SYSTOLIC BLOOD PRESSURE: 110 MMHG | HEIGHT: 66 IN | HEART RATE: 76 BPM | DIASTOLIC BLOOD PRESSURE: 60 MMHG | BODY MASS INDEX: 31.82 KG/M2 | WEIGHT: 198 LBS

## 2023-03-16 DIAGNOSIS — Z95.1 S/P CABG X 3: ICD-10-CM

## 2023-03-16 DIAGNOSIS — E55.9 VITAMIN D DEFICIENCY: ICD-10-CM

## 2023-03-16 DIAGNOSIS — I48.0 PAF (PAROXYSMAL ATRIAL FIBRILLATION) (HCC): ICD-10-CM

## 2023-03-16 DIAGNOSIS — D64.9 ANEMIA, UNSPECIFIED TYPE: ICD-10-CM

## 2023-03-16 DIAGNOSIS — I50.22 CHRONIC SYSTOLIC HF (HEART FAILURE) (HCC): Primary | ICD-10-CM

## 2023-03-16 DIAGNOSIS — I35.0 NONRHEUMATIC AORTIC VALVE STENOSIS: ICD-10-CM

## 2023-03-16 DIAGNOSIS — I95.9 HYPOTENSION, UNSPECIFIED HYPOTENSION TYPE: ICD-10-CM

## 2023-03-16 DIAGNOSIS — Z95.810 ICD (IMPLANTABLE CARDIOVERTER-DEFIBRILLATOR), DUAL, IN SITU: ICD-10-CM

## 2023-03-16 DIAGNOSIS — N28.9 RENAL INSUFFICIENCY: ICD-10-CM

## 2023-03-16 PROCEDURE — 3074F SYST BP LT 130 MM HG: CPT | Performed by: CLINICAL NURSE SPECIALIST

## 2023-03-16 PROCEDURE — 1123F ACP DISCUSS/DSCN MKR DOCD: CPT | Performed by: CLINICAL NURSE SPECIALIST

## 2023-03-16 PROCEDURE — 99214 OFFICE O/P EST MOD 30 MIN: CPT | Performed by: CLINICAL NURSE SPECIALIST

## 2023-03-16 PROCEDURE — 3078F DIAST BP <80 MM HG: CPT | Performed by: CLINICAL NURSE SPECIALIST

## 2023-03-16 NOTE — PROGRESS NOTES
[facility]  Progress Note    Primary Care Doctor:  Lisa Hernandez MD    Chief Complaint   Patient presents with    Follow-up    Congestive Heart Failure        History of Present Illness:  69 y.o. female with history of CAD/CABG in 2018, PAF with maze 2018, HTN, HLD, DVT/PE on coumadin, 2 CVs unsuccessful  -3/10/21 for small bowel pneumatosis with loculated pneumoperitoneum (IV zoysn and TPN), acute on chronic sHF (torsemide decreased at discharge, aldactone was held and coreg dose decreased due to hypotension), TORIBIO on CKD, PAF  ICD placed 2021  covid (antibodies, steroids and antibiotics). She was on prednisone  to  (off for 2 days). - for persistent cough, chest congestion pneumonia after failed po rounds of antibiotics, CT abdomen with pneumatosis and mesenteric gas and inflammation started on antibiotics which she has a couple days left. I had the pleasure of seeing Serafin Mccoy in follow up for systolic heart failure. She is in a wheel chair with her , Channing Minaya. Her optival continues to show increased impedence. Her weight is stable and she is taking all her medications. She is due for a metolazone tomorrow. She has never been on sglt2. No chest pain, palpitations, lightheadedness or increased shortness of breath. She continues with edema in her lower leg but not in her thighs today. No AF on optival      Past Medical History:   has a past medical history of Asthma, Atrial fibrillation ([address] Eosinophilia, Hemoptysis, HIGH CHOLESTEROL, Hx of blood clots, Hypertension, Irregular heart beat, Other specified gastritis without mention of hemorrhage, Palpitations, Skin cancer, and Type II or unspecified type diabetes mellitus without mention of complication, not stated as uncontrolled. Surgical History:   has a past surgical history that includes Cholecystectomy;   section; Colonoscopy (2017); skin biopsy; bronchoscopy (2016); Coronary artery bypass graft (08/21/2018); Mitral valve replacement (08/21/2018); transesophageal echocardiogram (08/21/2018); Tunneled venous catheter placement (Left, 08/23/2018); Cardiac catheterization (08/16/2018); Insertable Cardiac Monitor (Left, 08/16/2018); Colonoscopy (01/17/2014); Upper gastrointestinal endoscopy (N/A, 10/10/2018); Colonoscopy (10/10/2018); Colonoscopy (N/A, 8/7/2020); Pain management procedure (N/A, 12/18/2020); Pain management procedure (Bilateral, 1/18/2021); Cardiac catheterization (5/2 and 5/3 2021); and IR KYPHOPLASTY THORACIC 1 VERTEBRAL BODY (12/8/2022). Social History:   reports that she has never smoked. She has never used smokeless tobacco. She reports that she does not drink alcohol and does not use drugs. Family History:   Family History   Problem Relation Age of Onset    Cancer Father     Asthma Mother     Hypertension Mother     Heart Disease Mother     High Blood Pressure Mother        Home Medications:  Prior to Admission medications    Medication Sig Start Date End Date Taking?  Authorizing Provider   dapagliflozin (FARXIGA) 10 MG tablet Take 1 tablet by mouth every morning 3/16/23  Yes Savannah Childers APRN - CNS   vitamin D (ERGOCALCIFEROL) 1.25 MG (58113 UT) CAPS capsule TAKE 1 CAPSULE BY MOUTH ONCE WEEKLY 3/7/23  Yes Tara Dobbins MD   FREESTYLE LITE strip USE TO TEST BLOOD SUGAR FOUR TIMES A DAY 3/7/23  Yes Tara Dobbins MD   levothyroxine (SYNTHROID) 88 MCG tablet Take 1 tablet by mouth Daily 3/7/23  Yes Tara Dobbins MD   insulin lispro, 1 Unit Dial, (HUMALOG KWIKPEN) 100 UNIT/ML SOPN USE SLIDING SCALE, 140-199, 2 UNITS, 200-249, 3 UNITS, 250-300, 4 UNITS, GREATER THAN 300, INJECT 5 UNITS 3/7/23  Yes Tara Dobbins MD   predniSONE (DELTASONE) 10 MG tablet TAKE 1 TABLET AS NEEDED (EOSINOPHILIC GASTRITIS) 4/8/10  Yes Tara Dobbins MD   oxyCODONE (ROXICODONE) 5 MG immediate release tablet Take 1 tablet by mouth 3 times daily as needed for Pain for up to 30 days. 2/27/23 3/29/23 Yes Mohini Solorio MD   torsemide (DEMADEX) 20 MG tablet Take 2 tablets by mouth daily 2/14/23  Yes Mohini Solorio MD   metOLazone (ZAROXOLYN) 2.5 MG tablet TAKE 1 TABLET ON TUESDAY AND FRIDAY AND AS DIRECTED 2/13/23  Yes SOLEDAD Schuler - CNS   montelukast (SINGULAIR) 10 MG tablet TAKE 1 TABLET NIGHTLY 2/10/23  Yes Mohini Solorio MD   metoprolol succinate (TOPROL XL) 50 MG extended release tablet TAKE 1 TABLET DAILY 2/10/23  Yes Mohini Solorio MD   potassium chloride (KLOR-CON M) 10 MEQ extended release tablet TAKE 2 TABLET DAILY 2/9/23  Yes SOLEDAD Schuler - CNS   metoclopramide (REGLAN) 5 MG tablet Take 1 tablet by mouth 2 times daily (with meals) 1/13/23  Yes Mohini Solorio MD   nystatin (MYCOSTATIN) 961650 UNIT/ML suspension Take 5 mLs by mouth 4 times daily 12/21/22  Yes SOLEDAD Calderon - CNP   insulin glargine (LANTUS SOLOSTAR) 100 UNIT/ML injection pen Inject 16 Units into the skin every morning  Patient taking differently: Inject 10-15 Units into the skin every morning 12/14/22  Yes Mohini Solorio MD   midodrine (PROAMATINE) 2.5 MG tablet Take 1 tablet by mouth 3 times daily 11/16/22  Yes Mohini Solorio MD   calcitRIOL (ROCALTROL) 0.25 MCG capsule 1 tablet Monday, Wednesday and Friday  Patient taking differently: 0.25 mcg 1 tablet 6 days, skipping Tuesdays 11/16/22  Yes Mohini Solorio MD   warfarin (COUMADIN) 5 MG tablet TAKE 1 TABLET DAILY  Patient taking differently: Review INR prior to administration.   Managed by PCP 9/14/22  Yes Mohini Solorio MD   amiodarone (CORDARONE) 200 MG tablet TAKE 1 TABLET DAILY  Patient taking differently: Take 100 mg by mouth daily 3/10/22  Yes Eladia Wray MD   albuterol sulfate  (90 Base) MCG/ACT inhaler USE 2 INHALATIONS EVERY 6 HOURS AS NEEDED FOR WHEEZING 11/19/21  Yes Pitcher Lyndsey, MD   FreeStyle Lancets MISC 1 each by Does not apply route 4 times daily 9/7/21 Yes Morgan Pat MD   Blood Glucose Monitoring Suppl (FREESTYLE LITE) GAETANO 1 Device by Does not apply route 4 times daily   Yes Historical Provider, MD   Insulin Pen Needle (BD PEN NEEDLE JANNET U/F) 32G X 4 MM MISC USE 1 PEN NEEDLE FOUR TIMES A DAY 6/14/21  Yes Morgan Pat MD   ACETAMINOPHEN PO Take 500 mg by mouth every 6 hours as needed    Yes Historical Provider, MD   polyethylene glycol (GLYCOLAX) 17 GM/SCOOP powder Take 17 g by mouth as needed   Yes Historical Provider, MD   omeprazole (PRILOSEC) 20 MG delayed release capsule Take 20 mg by mouth daily   Yes Historical Provider, MD   ondansetron (ZOFRAN) 4 MG tablet Take 1 tablet by mouth 3 times daily as needed for Nausea or Vomiting 3/26/20  Yes Morgan Pat MD   aspirin 81 MG chewable tablet Take 1 tablet by mouth daily 6/7/19  Yes Morgan Pat MD   vitamin B-12 500 MCG tablet Take 1 tablet by mouth daily 10/12/18  Yes Daniel Farooq MD        Allergies:  Pkegtglu-[id number]-[id number] [fluocinolone], Ciprofloxacin, Diovan [valsartan], Flagyl [metronidazole], Metformin and related [metformin and related], Benazepril, Morphine, Saxagliptin, and Levaquin [levofloxacin]     Review of Systems:   Constitutional: there has been no unanticipated weight loss. There's been no change in energy level, sleep pattern, or activity level. Eyes: No visual changes or diplopia. No scleral icterus. ENT: No Headaches, hearing loss or vertigo. No mouth sores or sore throat. Cardiovascular: Reviewed in HPI  Respiratory: No cough or wheezing, no sputum production. No hematemesis. Gastrointestinal: No abdominal pain, appetite loss, blood in stools. No change in bowel or bladder habits. Genitourinary: No dysuria, trouble voiding, or hematuria. Musculoskeletal:  No gait disturbance, weakness or joint complaints. Integumentary: No rash or pruritis. Neurological: No headache, diplopia, change in muscle strength, numbness or tingling.  No change in gait, balance, coordination, mood, affect, memory, mentation, behavior. Psychiatric: No anxiety, no depression. Endocrine: No malaise, fatigue or temperature intolerance. No excessive thirst, fluid intake, or urination. No tremor. Hematologic/Lymphatic: No abnormal bruising or bleeding, blood clots or swollen lymph nodes. Allergic/Immunologic: No nasal congestion or hives. Physical Examination:    Vitals:    03/16/23 1330   BP: 110/60   Site: Left Upper Arm   Position: Sitting   Cuff Size: Large Adult   Pulse: 76   SpO2: 91%   Weight: 198 lb (89.8 kg)   Height: 5' 6\" (1.676 m)        Constitutional and General Appearance: Warm and dry, no apparent distress, normal coloration  HEENT:  Normocephalic, atraumatic  Respiratory:  Normal excursion and expansion without use of accessory muscles  Resp Auscultation: no rales, rhonchi or wheezing  Cardiovascular: The apical impulses not displaced  Heart tones are crisp and normal  JVP normal cm H2O  NSR no AF on optival  Peripheral pulses are symmetrical and full  There is no clubbing, cyanosis of the extremities.   Bilateral ankle to calf  Pedal Pulses: 2+ and equal   Abdomen:   No masses or tenderness  Liver/Spleen: No Abnormalities Noted  Neurological/Psychiatric:  Alert and oriented in all spheres  Moves all extremities well  Exhibits normal gait balance and coordination  No abnormalities of mood, affect, memory, mentation, or behavior are noted    Lab Data:    CBC:   Lab Results   Component Value Date/Time    WBC 5.7 02/08/2023 02:38 PM    WBC 6.1 01/30/2023 11:11 AM    WBC 5.3 01/10/2023 05:39 AM    RBC 3.56 02/08/2023 02:38 PM    RBC 3.65 01/30/2023 11:11 AM    RBC 3.82 01/10/2023 05:39 AM    HGB 10.5 02/08/2023 02:38 PM    HGB 10.8 01/30/2023 11:11 AM    HGB 11.5 01/10/2023 05:39 AM    HCT 33.3 02/08/2023 02:38 PM    HCT 33.6 01/30/2023 11:11 AM    HCT 35.3 01/10/2023 05:39 AM    MCV 93.6 02/08/2023 02:38 PM    MCV 92.0 01/30/2023 11:11 AM    MCV 92.2 01/10/2023 05:39 AM    RDW 16.5 02/08/2023 02:38 PM    RDW 15.5 01/30/2023 11:11 AM    RDW 15.5 01/10/2023 05:39 AM     02/08/2023 02:38 PM     01/30/2023 11:11 AM     01/10/2023 05:39 AM     BMP:  Lab Results   Component Value Date/Time     03/15/2023 09:08 AM     02/23/2023 12:33 PM     02/13/2023 12:16 PM    K 3.4 03/15/2023 09:08 AM    K 3.3 02/23/2023 12:33 PM    K 3.3 02/13/2023 12:16 PM    K 3.5 01/04/2023 06:00 AM    K 4.6 01/03/2023 01:57 PM    K 4.0 05/18/2022 04:23 AM    CL 99 03/15/2023 09:08 AM    CL 98 02/23/2023 12:33 PM     02/13/2023 12:16 PM    CO2 29 03/15/2023 09:08 AM    CO2 30 02/23/2023 12:33 PM    CO2 31 02/13/2023 12:16 PM    PHOS 3.2 01/30/2023 11:11 AM    PHOS 3.2 01/10/2023 05:39 AM    PHOS 3.6 01/09/2023 05:56 AM    BUN 29 03/15/2023 09:08 AM    BUN 37 02/23/2023 12:33 PM    BUN 28 02/13/2023 12:16 PM    CREATININE 1.5 03/15/2023 09:08 AM    CREATININE 1.6 02/23/2023 12:33 PM    CREATININE 1.6 02/13/2023 12:16 PM     BNP:   Lab Results   Component Value Date/Time    PROBNP 8,140 03/15/2023 09:08 AM    PROBNP 8,557 02/23/2023 12:33 PM    PROBNP 10,561 02/08/2023 02:38 PM     Cardiac Imaging:  Echo 1/6/2023   Summary   Left ventricular cavity size is normal with mild concentric left ventricular   hypertrophy. Ejection fraction is visually estimated to be 30%. There is severe diffuse global hypokinesis with akinesis of the apex, apical   septum, apical lateral, apical inferior, and apical anterior walls. Cannot determine diastology due to mitral valve replacement. Left atrium is severely dilated. At least Mild aortic stenosis with a peak velocity of 2.4m/s and a mean   pressure gradient of 14mmHg. At least moderate aortic regurgitation. A bioprosthetic mitral valve is well seated with peak velocity of 2.1m/s and   a mean gradient of 7mmHg. No evidence of mitral regurgitation. Mild to moderate tricuspid regurgitation.    RVSP is estimated to be 31-34 mmHG. IVC not well visualized. Echo 5/6/2022  Summary   Technically difficult examination due to visualization and irregular rhythm   Normal left ventricle size. There is moderate concentric left ventricular hypertrophy. Ejection fraction is visually estimated to be 40%. Overall left ventricular systolic function appears moderately reduced. There is akinesis of the apex walls. 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   No evidence of apical thrombus by contrast administration. A bioprosthetic mitral valve is well seated. Visually opens well. Cannot   adequately assess for dysfunction as gradients not obtained. The left atrium is moderately dilated. No pericardial effusion. JUDE Preliminary 5/4/2021 Dr Cornelia Garcia  AV - area ~ 1.5, opens adequately, not severe aortic stenosis     [facility] 5/3/2021 Dr Cornelia Garcia  Artery Findings/Result   LM Normal   LAD 50% mid, 70% mid, competitive flow distal from LIMA   Cx OM1 20% ostial to prox, superior branch mid OM1 50% ostial   RI N/A   S-D-RPL patent   L-LAD patent   RCA 50-60% distal, very heavily calcified   LVEDP 15   AV Peak to peak gradient 36mmHg, valve crossed easily with pigtail. LVG NA      Intervention:         None     Post Cath Dx:       Patent grafts     Echo 4/20/21  Summary   Overall left ventricular systolic function is severely depressed . Ejection fraction is visually estimated to be 10-15 %. E/e'= 23.2 GLS=-3.4   Moderately dilated left ventricle. There is severe diffuse hypokinesis. Indeterminate diastolic function. A bioprosthetic mitral valve is well seated with peak velocity of 1.59m/s   and a mean gradient of 3mmHg. The left atrium is moderately dilated. Mild aortic stenosis with a peak velocity of 2.5m/s and a mean pressure   gradient of 14mmHg. The aortic valve is thickened/calcified with decreased leaflet mobility   consistent with aortic stenosis. Mild to moderate tricuspid regurgitation.    Estimated pulmonary artery systolic pressure is mildly to moderately   elevated at 46 mmHg assuming a right atrial pressure of 8 mmHg    11/30/2020 Dobutamine Echo   Dobutamine echocardiogram for aortic stenosis. Very technically limited exam due to atrial fibrillation. Baseline echocardiogram shows severe left ventricular dysfunction with   anterior, lateral and apical hypokinesis with an ejection fraction of 20-25%. There is no improvement in wall motion with low or high dose dobutamine   suggestive of nonviable myocardium. Mild prosthetic aortic valve stenosis with a mean gradient of 16mmHg and   peak velocity of 2.47m/s at rest. After dobutamine stress the mean AV   gradient rises to 20mmHg with 20mcg of dobutamine consistent with mild to   moderate aortic stenosis. Prosthetic mitral valve with a mean gradient of 7mmHg        JUDE 10/21/2020  Mildly dilated left ventricular size and normal wall thickness. Global left ventricular function is severely decreased with ejection fraction estimated at 25%. Patient is in atrial fibrillation during procedure. The bioprosthetic mitral valve is well seated with a mean gradient of 5mmHg and maximum pressure gradient of 15 mmHg with heart rate of 89 bpm. Pressure half time of 82 ms. There is trivial mitral regurgitation. Left atrial enlargement. Spontaneous echo contrast seen in the left atrium. A large stump of the left atrial appendage with no thrombus noted. Patient has history of surgical ligation of the left atrial appendage. There are spontaneous echo contrast in the atrial appendage. The aortic valve is thickened/calcified with decreased leaflet mobility consistent with aortic stenosis. There is trivial aortic insufficiency. There is moderate tricuspid regurgitation. ECHO 9/15/20  Left ventricular cavity size is dilated. There is normal wall thickness with a moderately severe reduction in   systolic function. LV ejection fraction is visually estimated to be 25-30%. Indeterminate diastolic function. The right ventricle is not well visualized but appears to be normal in size with moderately reduced function. The left atrium is moderately dilated. A bioprosthetic mitral valve with a mean gradient of 7 mmHg. This may be a normal gradient for this valve but could suggest mild stenosis. Trivial mitral regurgitation. The aortic valve is thickened/calcified with a mean gradient of 16mmHg consistent with at least mild aortic stenosis. This is likely underestimated due to low cardiac output secondary to LV dysfunction. No significant aortic valve regurgitation. Moderate tricuspid regurgitation with RVSP of 48 mmHg. JUDE 11/1/2019  Normal left ventricular cavity size and wall thickness. Global left ventricular function is moderate-to-severely decreased with ejection fraction estimated from 35% to 40%. Severe apical akinesis noted. The bioprosthetic mitral valve is well seated with a mean gradient of 2mmHg and maximum pressure gradient of 5 mmHg. There is trivial mitral regurgitation. Left atrial enlargement. Spontaneous echo contrast seen in the left atrium. Stump of the left atrial appendage noted with no thrombus. The aortic valve is thickened/calcified with decreased leaflet mobility consistent with aortic stenosis. There is trivial aortic insufficiency    Assessment:    1. Chronic systolic heart failure due to valvular disease (HCC) BB and aldosterone antagonist; no ace/arb due ckd   2. PAF (paroxysmal atrial fibrillation) (HCC) on coumadin in nsr   3. S/P CABG x 3    4. Nonrheumatic aortic valve stenosis    5. Hypotension on midodrine   6. Renal insufficiency  7. ICD in place  8. Anemia  9.       Vitamin d deficiency      Plan:   Patient Instructions   Start farxiga 10 mg once a day if ok with Dr Ruth Mccarthy on taking the metolazone tomorrow  Continue all current medications  RTO in 2 months  Blood work in 1 month    I appreciate the opportunity of cooperating in the care of this individual.    Lv Chaparro, SOLEDAD - CNS, CNS, 3/16/2023, 4:03 PM

## 2023-03-16 NOTE — PATIENT INSTRUCTIONS
Start farxiga 10 mg once a day if ok with Dr Crhisten Lane on taking the metolazone tomorrow  Continue all current medications  RTO in 2 months  Blood work in 1 month

## 2023-03-17 ENCOUNTER — OFFICE VISIT (OUTPATIENT)
Dept: FAMILY MEDICINE CLINIC | Age: 78
End: 2023-03-17

## 2023-03-17 VITALS
RESPIRATION RATE: 16 BRPM | HEART RATE: 88 BPM | SYSTOLIC BLOOD PRESSURE: 118 MMHG | BODY MASS INDEX: 32.12 KG/M2 | WEIGHT: 199 LBS | OXYGEN SATURATION: 96 % | TEMPERATURE: 97.2 F | DIASTOLIC BLOOD PRESSURE: 70 MMHG

## 2023-03-17 DIAGNOSIS — D68.69 HYPERCOAGULABLE STATE, SECONDARY (HCC): ICD-10-CM

## 2023-03-17 DIAGNOSIS — Z79.4 TYPE 2 DIABETES MELLITUS WITH STAGE 3B CHRONIC KIDNEY DISEASE, WITH LONG-TERM CURRENT USE OF INSULIN (HCC): Primary | ICD-10-CM

## 2023-03-17 DIAGNOSIS — K55.1 ATHEROSCLEROSIS OF SUPERIOR MESENTERIC ARTERY (HCC): ICD-10-CM

## 2023-03-17 DIAGNOSIS — I50.32 CHRONIC DIASTOLIC HEART FAILURE (HCC): ICD-10-CM

## 2023-03-17 DIAGNOSIS — E11.22 TYPE 2 DIABETES MELLITUS WITH STAGE 3B CHRONIC KIDNEY DISEASE, WITH LONG-TERM CURRENT USE OF INSULIN (HCC): Primary | ICD-10-CM

## 2023-03-17 DIAGNOSIS — I25.118 CORONARY ARTERY DISEASE OF NATIVE ARTERY OF NATIVE HEART WITH STABLE ANGINA PECTORIS (HCC): ICD-10-CM

## 2023-03-17 DIAGNOSIS — N18.32 TYPE 2 DIABETES MELLITUS WITH STAGE 3B CHRONIC KIDNEY DISEASE, WITH LONG-TERM CURRENT USE OF INSULIN (HCC): Primary | ICD-10-CM

## 2023-03-17 DIAGNOSIS — E87.6 HYPOKALEMIA: ICD-10-CM

## 2023-03-17 PROBLEM — E87.1 HYPONATREMIA: Status: RESOLVED | Noted: 2023-01-06 | Resolved: 2023-03-17

## 2023-03-17 RX ORDER — CALCITRIOL 0.25 UG/1
CAPSULE, LIQUID FILLED ORAL
Qty: 40 CAPSULE | Refills: 3 | Status: SHIPPED | OUTPATIENT
Start: 2023-03-17

## 2023-03-17 RX ORDER — METOCLOPRAMIDE 5 MG/1
5 TABLET ORAL 2 TIMES DAILY WITH MEALS
Qty: 180 TABLET | Refills: 3 | Status: SHIPPED | OUTPATIENT
Start: 2023-03-17

## 2023-03-17 NOTE — TELEPHONE ENCOUNTER
Sample requested:  Jardiance    Strength:     Dosage:     Patient's call back number: [phone]       DR Simon suggest trying Jardiance in place of Farxiga. Ins should cover this and it is a cheaper alterative. Pt asking if they can try samples of Jardiance. Please advise pt.

## 2023-03-21 RX ORDER — ALBUTEROL SULFATE 90 UG/1
AEROSOL, METERED RESPIRATORY (INHALATION)
Qty: 25.5 G | Refills: 2 | Status: SHIPPED | OUTPATIENT
Start: 2023-03-21

## 2023-03-24 ENCOUNTER — ANTI-COAG VISIT (OUTPATIENT)
Dept: FAMILY MEDICINE CLINIC | Age: 78
End: 2023-03-24

## 2023-03-24 ENCOUNTER — TELEPHONE (OUTPATIENT)
Dept: FAMILY MEDICINE CLINIC | Age: 78
End: 2023-03-24

## 2023-03-24 LAB — INTERNATIONAL NORMALIZATION RATIO, POC: 2.5

## 2023-03-24 NOTE — TELEPHONE ENCOUNTER
Pt wt since starting the the [facility]   3/19 190  3/20 190.4  3/21 190.6  3/22 193.8  3/23 197.00  3/23 200.2    Pt did  the samples of the Caren Kras, however they have not started it yet. Pt wants to know they should do about the weight gain today and over the weekend?     Pls advise thank you   Ml Lino   [phone]

## 2023-03-24 NOTE — TELEPHONE ENCOUNTER
Called to advise pt's INR results. Per [facility] INR was 2.5 pt should take 2.5mg on Mondays and Friday, and 5 mg all other days.  Recheck IRN 2 weeks       Pt's  advise

## 2023-03-28 NOTE — TELEPHONE ENCOUNTER
Spouse sates it graham been since Friday that they called and have not heard back from anyone. Pt is up to 200 lbs and they need to know what to do. Please advise.

## 2023-03-28 NOTE — TELEPHONE ENCOUNTER
I am just receiving this message today which has been out there since Friday    Ask if she has taken her metolazone yet this week

## 2023-03-29 RX ORDER — METOLAZONE 2.5 MG/1
TABLET ORAL
Qty: 30 TABLET | Refills: 1 | Status: SHIPPED | OUTPATIENT
Start: 2023-03-29

## 2023-03-31 RX ORDER — MIDODRINE HYDROCHLORIDE 2.5 MG/1
TABLET ORAL
Qty: 90 TABLET | Refills: 0 | Status: SHIPPED | OUTPATIENT
Start: 2023-03-31

## 2023-03-31 NOTE — TELEPHONE ENCOUNTER
Medication:   Requested Prescriptions     Pending Prescriptions Disp Refills    midodrine (PROAMATINE) 2.5 MG tablet [Pharmacy Med Name: MIDODRINE HCL 2.5 MG TABLET] 90 tablet 1     Sig: TAKE 1 TABLET BY MOUTH THREE TIMES A DAY      Last Filled:      Patient Phone Number: [phone] (home)     Last appt: 3/17/2023   Next appt: Visit date not found    Last OARRS:   RX Monitoring 3/1/2019   Attestation The Prescription Monitoring Report for this patient was reviewed today. Periodic Controlled Substance Monitoring -     PDMP Monitoring:    Last PDMP Amrit Padilla as Reviewed [facility]):  Review User Review Instant Review Result   Itmi Moesthela 1/26/2023 10:49 AM Reviewed PDMP [1]     Preferred Pharmacy:   [facility] [place] [phone] - f [phone]  [address] [place] [address] [address] [address]  Phone: [phone] Fax: [phone]    [facility]/pharmacy [address] [place] [address] [phone] - f [phone]  [address] [place] [address] Rd.   [place] [address]  Phone: [phone] Fax: [phone]

## 2023-04-04 ENCOUNTER — PATIENT MESSAGE (OUTPATIENT)
Dept: FAMILY MEDICINE CLINIC | Age: 78
End: 2023-04-04

## 2023-04-04 DIAGNOSIS — M17.0 PRIMARY OSTEOARTHRITIS OF BOTH KNEES: ICD-10-CM

## 2023-04-04 DIAGNOSIS — E11.69 DIABETES MELLITUS TYPE 2 IN OBESE (HCC): ICD-10-CM

## 2023-04-04 DIAGNOSIS — E66.9 DIABETES MELLITUS TYPE 2 IN OBESE (HCC): ICD-10-CM

## 2023-04-04 RX ORDER — OXYCODONE HYDROCHLORIDE 5 MG/1
5 TABLET ORAL 3 TIMES DAILY PRN
Qty: 90 TABLET | Refills: 0 | Status: SHIPPED | OUTPATIENT
Start: 2023-04-04 | End: 2023-05-04

## 2023-04-04 RX ORDER — BLOOD-GLUCOSE METER
KIT MISCELLANEOUS
Qty: 200 STRIP | Refills: 3 | Status: SHIPPED | OUTPATIENT
Start: 2023-04-04 | End: 2023-04-07 | Stop reason: SDUPTHER

## 2023-04-04 RX ORDER — LANCETS 28 GAUGE
1 EACH MISCELLANEOUS 4 TIMES DAILY
Qty: 200 EACH | Refills: 5 | Status: SHIPPED | OUTPATIENT
Start: 2023-04-04

## 2023-04-04 NOTE — TELEPHONE ENCOUNTER
From: Lynn Keith  To: Dr. Amara Acosta: 4/4/2023 10:03 AM EDT  Subject: prescription refill    need freestyle strips [ test 4 times each day]. Also freestyle lancets. Need to refill ic 0xycodone 5 mg 90 pills taken 3 times daily.

## 2023-04-04 NOTE — TELEPHONE ENCOUNTER
Medication:   Requested Prescriptions     Pending Prescriptions Disp Refills    FreeStyle Lancets MISC 200 each 5     Si each by Does not apply route 4 times daily    oxyCODONE (ROXICODONE) 5 MG immediate release tablet 90 tablet 0     Sig: Take 1 tablet by mouth 3 times daily as needed for Pain for up to 30 days. blood glucose test strips (FREESTYLE LITE) strip 200 strip 3     Sig: As needed. Last Filled:  2023  Patient Phone Number: [phone] (home)     Last appt: 3/17/2023   Next appt: Visit date not found    Last OARRS:   RX Monitoring 3/1/2019   Attestation The Prescription Monitoring Report for this patient was reviewed today. Periodic Controlled Substance Monitoring -     PDMP Monitoring:    Last PDMP Dominique Wray as Reviewed [facility]):  Review User Review Instant Review Result   Coral Harmon 2023 10:49 AM Reviewed PDMP [1]     Preferred Pharmacy:   [address] [place] [address], [address] [phone] - f [phone]  [address] [place] [address]  Phone: [phone] Fax: [phone]    [facility]/pharmacy [address] [place] [address], [address] [place] [address] [phone] - f [phone]  [address] [place] [address] Rd.   Brandt Chavis [address]  Phone: [phone] Fax: [phone]

## 2023-04-05 ENCOUNTER — HOSPITAL ENCOUNTER (OUTPATIENT)
Dept: ULTRASOUND IMAGING | Age: 78
Discharge: HOME | End: 2023-04-05
Payer: MEDICARE

## 2023-04-05 ENCOUNTER — TELEPHONE (OUTPATIENT)
Dept: WOMENS IMAGING | Age: 78
End: 2023-04-05

## 2023-04-05 ENCOUNTER — PRE-PROCEDURE TELEPHONE (OUTPATIENT)
Dept: WOMENS IMAGING | Age: 78
End: 2023-04-05

## 2023-04-05 ENCOUNTER — HOSPITAL ENCOUNTER (OUTPATIENT)
Dept: WOMENS IMAGING | Age: 78
Discharge: HOME | End: 2023-04-05
Payer: MEDICARE

## 2023-04-05 DIAGNOSIS — R92.8 ABNORMAL MAMMOGRAM OF LEFT BREAST: Primary | ICD-10-CM

## 2023-04-05 DIAGNOSIS — R92.8 FOLLOW-UP EXAMINATION OF ABNORMAL MAMMOGRAM: ICD-10-CM

## 2023-04-05 DIAGNOSIS — R92.8 ABNORMAL MAMMOGRAM: ICD-10-CM

## 2023-04-05 PROCEDURE — G0279 TOMOSYNTHESIS, MAMMO: HCPCS

## 2023-04-05 PROCEDURE — 76642 ULTRASOUND BREAST LIMITED: CPT

## 2023-04-05 NOTE — PROGRESS NOTES
Imaging Navigator reviewed pre-procedure ultrasound guided breast biopsy patient education information in person and gave written instructions. Reviewed medications and need to hold all blood thinners per instructions. Warfarin for 5 days   Patient should take all other medications as prescribed. Patient can eat and drink as normal prior to the procedure. Be sure to wear a bra with good support and a two piece outfit for comfort. Patient can bring someone with you but you can also drive yourself. Plan on being at the breast center for 2-2 and a half hours. Reviewed the process of a ultrasound biopsy. The skin is cleaned and a local anesthetic is given to numb the area. A small skin nick is made for the biopsy needle and then tissue samples are taken. A tiny marker is then placed inside your breast at the site of the biopsy for future reference. Pressure is then held on the biopsy site to stop bleeding and steri strips, bandage and waterproof dressing is applied. A mammogram is then done to validate the tissue marker. The tissue sample is sent to pathology. Results will come back in 2-3 business days and sent to your referring physician. Either they or the nurse navigator will call you with the results and recommended follow up needed. Patients states understanding.

## 2023-04-07 ENCOUNTER — OFFICE VISIT (OUTPATIENT)
Dept: FAMILY MEDICINE CLINIC | Age: 78
End: 2023-04-07

## 2023-04-07 VITALS — HEART RATE: 78 BPM | OXYGEN SATURATION: 98 % | TEMPERATURE: 97.2 F

## 2023-04-07 DIAGNOSIS — J20.9 ACUTE BRONCHITIS, UNSPECIFIED ORGANISM: Primary | ICD-10-CM

## 2023-04-07 DIAGNOSIS — E66.9 DIABETES MELLITUS TYPE 2 IN OBESE (HCC): ICD-10-CM

## 2023-04-07 DIAGNOSIS — E11.69 DIABETES MELLITUS TYPE 2 IN OBESE (HCC): ICD-10-CM

## 2023-04-07 RX ORDER — CEPHALEXIN 500 MG/1
500 CAPSULE ORAL 3 TIMES DAILY
Qty: 21 CAPSULE | Refills: 0 | Status: SHIPPED | OUTPATIENT
Start: 2023-04-07 | End: 2023-04-14

## 2023-04-07 RX ORDER — BLOOD-GLUCOSE METER
KIT MISCELLANEOUS
Qty: 200 STRIP | Refills: 3 | Status: SHIPPED | OUTPATIENT
Start: 2023-04-07

## 2023-04-07 RX ORDER — FLUCONAZOLE 150 MG/1
150 TABLET ORAL WEEKLY
Qty: 2 TABLET | Refills: 0 | Status: SHIPPED | OUTPATIENT
Start: 2023-04-07 | End: 2023-04-15

## 2023-04-07 NOTE — PROGRESS NOTES
Subjective:      Patient ID: Isiah Faye is a 69 y.o. female. HPI patient presents in accompaniment of  with persistent cough. The cough has been going on now for over a week. She states initially was productive but now its not. The cough does not seem to make a difference morning evening or bedtime. No reported fevers or chills. Review of Systems  Allergies   Allergen Reactions    Vpfxrmxp-Kzsebvy-Airulz [Fluocinolone] Shortness Of Breath    Ciprofloxacin Shortness Of Breath    Diovan [Valsartan] Shortness Of Breath    Flagyl [Metronidazole] Shortness Of Breath     Has taken diflucan at home 12/7/15    Metformin And Related [Metformin And Related] Shortness Of Breath    Benazepril      Other reaction(s): Not Recorded    Morphine      Bad reaction. \"makes her feel horrible\". Saxagliptin      Other reaction(s): Not Recorded    Levaquin [Levofloxacin] Rash     Vitals:    04/07/23 0953   Pulse: 78   Temp: 97.2 °F (36.2 °C)   SpO2: 98%       Objective:   Physical Exam  Constitutional:       General: She is not in acute distress. HENT:      Right Ear: Tympanic membrane normal.      Left Ear: Tympanic membrane normal.      Nose: Nose normal.      Mouth/Throat:      Pharynx: Uvula midline. Pulmonary:      Effort: Pulmonary effort is normal.      Breath sounds: Rhonchi present. No decreased breath sounds, wheezing or rales. Musculoskeletal:      Cervical back: Neck supple. Lymphadenopathy:      Cervical: No cervical adenopathy. Neurological:      Mental Status: She is alert. Assessment:      Nelson Ch was seen today for cough. Diagnoses and all orders for this visit:    Acute bronchitis, unspecified organism    Diabetes mellitus type 2 in obese ([address]  -     blood glucose test strips (FREESTYLE LITE) strip; As needed. Other orders  -     cephALEXin (KEFLEX) 500 MG capsule; Take 1 capsule by mouth 3 times daily for 7 days  -     fluconazole (DIFLUCAN) 150 MG tablet;  Take 1 tablet by

## 2023-04-16 ENCOUNTER — PATIENT MESSAGE (OUTPATIENT)
Dept: FAMILY MEDICINE CLINIC | Age: 78
End: 2023-04-16

## 2023-04-17 NOTE — TELEPHONE ENCOUNTER
From: Nile Kemp  To: Dr. Keys Ireland: 4/16/2023 2:29 PM EDT  Subject: Test Strips    it has been well over a week and we still have not got test strips.  Will need them on Tuesday or will have to get some strips some where

## 2023-04-17 NOTE — TELEPHONE ENCOUNTER
Patient advised I talked to [facility] pharmacist and he ran it through on [facility] and then Yamileth Garduno and it went through according to him. Advised pt.

## 2023-04-18 ENCOUNTER — TELEPHONE (OUTPATIENT)
Dept: CARDIOLOGY CLINIC | Age: 78
End: 2023-04-18

## 2023-04-18 NOTE — TELEPHONE ENCOUNTER
Pt  confirmed that pt is taking 40 mg torsemide. 20 in the morning and 20 in the evening.  Any other questions please roshni.

## 2023-04-18 NOTE — TELEPHONE ENCOUNTER
Tried to reach patient about the metolazone dose. LMOM for her to return our call to confirm how she is taking the medication. Spoke to patient's  she was taking metolazone on tuesdays and fridays.  Spoke to him about taking metolazone once a week per NPR[facility] VO

## 2023-04-18 NOTE — TELEPHONE ENCOUNTER
Tried to reach patient, [facility] for her to call the office to confirm torsemide dose of 40 mg daily

## 2023-04-18 NOTE — TELEPHONE ENCOUNTER
----- Message from SOLEDAD Cm sent at 4/18/2023  8:25 AM EDT -----  Fluid level is much improved and creat is up  Confirm diuretic dosing will need to cut back

## 2023-04-20 ENCOUNTER — HOSPITAL ENCOUNTER (OUTPATIENT)
Dept: WOMENS IMAGING | Age: 78
Discharge: HOME | End: 2023-04-20
Payer: MEDICARE

## 2023-04-20 ENCOUNTER — HOSPITAL ENCOUNTER (OUTPATIENT)
Dept: ULTRASOUND IMAGING | Age: 78
Discharge: HOME | End: 2023-04-20
Payer: MEDICARE

## 2023-04-20 DIAGNOSIS — R92.8 ABNORMAL MAMMOGRAM OF LEFT BREAST: ICD-10-CM

## 2023-04-20 DIAGNOSIS — Z98.890 STATUS POST LEFT BREAST BIOPSY: ICD-10-CM

## 2023-04-20 PROCEDURE — 77065 DX MAMMO INCL CAD UNI: CPT

## 2023-04-20 PROCEDURE — 19083 BX BREAST 1ST LESION US IMAG: CPT

## 2023-04-20 PROCEDURE — 2500000003 HC RX 250 WO HCPCS: Performed by: FAMILY MEDICINE

## 2023-04-20 PROCEDURE — 88305 TISSUE EXAM BY PATHOLOGIST: CPT

## 2023-04-20 RX ORDER — LIDOCAINE HYDROCHLORIDE AND EPINEPHRINE BITARTRATE 20; .01 MG/ML; MG/ML
20 INJECTION, SOLUTION SUBCUTANEOUS ONCE
Status: COMPLETED | OUTPATIENT
Start: 2023-04-20 | End: 2023-04-20

## 2023-04-20 RX ORDER — LIDOCAINE HYDROCHLORIDE 10 MG/ML
5 INJECTION, SOLUTION EPIDURAL; INFILTRATION; INTRACAUDAL; PERINEURAL ONCE
Status: COMPLETED | OUTPATIENT
Start: 2023-04-20 | End: 2023-04-20

## 2023-04-20 RX ADMIN — LIDOCAINE HYDROCHLORIDE 5 ML: 10 INJECTION, SOLUTION EPIDURAL; INFILTRATION; INTRACAUDAL; PERINEURAL at 13:25

## 2023-04-20 RX ADMIN — LIDOCAINE HYDROCHLORIDE,EPINEPHRINE BITARTRATE 10 ML: 20; .01 INJECTION, SOLUTION INFILTRATION; PERINEURAL at 13:26

## 2023-04-20 ASSESSMENT — PAIN DESCRIPTION - DESCRIPTORS: DESCRIPTORS: DISCOMFORT

## 2023-04-20 ASSESSMENT — PAIN SCALES - GENERAL: PAINLEVEL_OUTOF10: 3

## 2023-04-20 ASSESSMENT — PAIN DESCRIPTION - LOCATION: LOCATION: BREAST

## 2023-04-20 ASSESSMENT — PAIN DESCRIPTION - ORIENTATION: ORIENTATION: LEFT

## 2023-04-20 NOTE — PROGRESS NOTES
Patient here for left breast biopsy. NN reviewed the health history, allergies and medications. Coumadin held since 4/15.  Peter Reyes with patient. Radiologist reviews procedure with patient, consent signed. Patient tolerates procedure well. Compression held. Site cleansed with chloraprep, steri strips and dry dressing applied. Ice pack in place. Reviewed discharge instructions with patient and signed copy. Patient verbalized understanding and agreed to contact Nurse Navigator with any questions. Patient A&Ox3, discharged home via patient wheelchair.

## 2023-04-21 ENCOUNTER — TELEPHONE (OUTPATIENT)
Dept: WOMENS IMAGING | Age: 78
End: 2023-04-21

## 2023-04-21 DIAGNOSIS — R92.8 ABNORMAL MAMMOGRAM OF LEFT BREAST: Primary | ICD-10-CM

## 2023-04-21 NOTE — TELEPHONE ENCOUNTER
Nurse Navigator reviewed results of breast biopsy which showed mild stromal fibrosis and focal columnar cell change on the pathology report. Negative for atypia or malignancy. NN reviewed radiologist follow up recommendations for a six-month left breast ultrasound.

## 2023-04-25 RX ORDER — MIDODRINE HYDROCHLORIDE 2.5 MG/1
TABLET ORAL
Qty: 90 TABLET | Refills: 2 | Status: ON HOLD | OUTPATIENT
Start: 2023-04-25 | End: 2023-06-08 | Stop reason: HOSPADM

## 2023-04-27 ENCOUNTER — ANTI-COAG VISIT (OUTPATIENT)
Dept: FAMILY MEDICINE CLINIC | Age: 78
End: 2023-04-27

## 2023-04-27 ENCOUNTER — TELEPHONE (OUTPATIENT)
Dept: FAMILY MEDICINE CLINIC | Age: 78
End: 2023-04-27

## 2023-04-27 LAB — INR BLD: 1.7

## 2023-04-27 NOTE — TELEPHONE ENCOUNTER
[facility] Fridays dose to 5mg and check in one week on Wednesday 5/3. Entered info into [employer] tracking. Called Pt, no answer, LVM.

## 2023-04-27 NOTE — TELEPHONE ENCOUNTER
Called Pt again, answered phone, gave new dosing and testing instructions. Stated she understood without further questions.

## 2023-04-27 NOTE — TELEPHONE ENCOUNTER
[place] called to report patients INR 1.7 for today. Please give her a call as soon as possible. She can be reached at [phone]. Please advise.

## 2023-05-03 ENCOUNTER — PATIENT MESSAGE (OUTPATIENT)
Dept: FAMILY MEDICINE CLINIC | Age: 78
End: 2023-05-03

## 2023-05-03 ENCOUNTER — ANTI-COAG VISIT (OUTPATIENT)
Dept: FAMILY MEDICINE CLINIC | Age: 78
End: 2023-05-03

## 2023-05-03 DIAGNOSIS — M17.0 PRIMARY OSTEOARTHRITIS OF BOTH KNEES: ICD-10-CM

## 2023-05-03 LAB — INR BLD: 2.5

## 2023-05-03 RX ORDER — OXYCODONE HYDROCHLORIDE 5 MG/1
5 TABLET ORAL 3 TIMES DAILY PRN
Qty: 90 TABLET | Refills: 0 | Status: SHIPPED | OUTPATIENT
Start: 2023-05-03 | End: 2023-06-02

## 2023-05-03 NOTE — TELEPHONE ENCOUNTER
From: Alcides Hays  To: Dr. Radha Crawford  Sent: 5/3/2023 10:28 AM EDT  Subject: prescription refill    Need oxy refill. We are using 5 milagram 3 times a day for the month.

## 2023-05-03 NOTE — TELEPHONE ENCOUNTER
Medication:   Requested Prescriptions     Pending Prescriptions Disp Refills    oxyCODONE (ROXICODONE) 5 MG immediate release tablet 90 tablet 0     Sig: Take 1 tablet by mouth 3 times daily as needed for Pain for up to 30 days. Last Filled:      Patient Phone Number: [phone] (home)     Last appt: 4/7/2023   Next appt: Visit date not found    Last OARRS:   RX Monitoring 3/1/2019   Attestation The Prescription Monitoring Report for this patient was reviewed today. Periodic Controlled Substance Monitoring -     PDMP Monitoring:    Last PDMP Merline Blanco as Reviewed [facility]):  Review User Review Instant Review Result   Kiesha Schuster 1/26/2023 10:49 AM Reviewed PDMP [1]     Preferred Pharmacy:   [address] [place] [address], [address] [phone] - f [phone]  [address] [place] [address]  Phone: [phone] Fax: [phone]    [facility]/pharmacy [address] [place] [address] [phone] - f [phone]  [address] [place] [address] Rd.   [address] [place] [address]  Phone: [phone] Fax: [phone]

## 2023-05-11 ENCOUNTER — OFFICE VISIT (OUTPATIENT)
Dept: CARDIOLOGY CLINIC | Age: 78
End: 2023-05-11
Payer: MEDICARE

## 2023-05-11 VITALS
DIASTOLIC BLOOD PRESSURE: 60 MMHG | BODY MASS INDEX: 33.11 KG/M2 | OXYGEN SATURATION: 91 % | HEART RATE: 78 BPM | WEIGHT: 206 LBS | HEIGHT: 66 IN | SYSTOLIC BLOOD PRESSURE: 118 MMHG

## 2023-05-11 DIAGNOSIS — Z95.1 S/P CABG X 3: ICD-10-CM

## 2023-05-11 DIAGNOSIS — N28.9 RENAL INSUFFICIENCY: ICD-10-CM

## 2023-05-11 DIAGNOSIS — Z95.810 ICD (IMPLANTABLE CARDIOVERTER-DEFIBRILLATOR), DUAL, IN SITU: ICD-10-CM

## 2023-05-11 DIAGNOSIS — I95.9 HYPOTENSION, UNSPECIFIED HYPOTENSION TYPE: ICD-10-CM

## 2023-05-11 DIAGNOSIS — I48.0 PAF (PAROXYSMAL ATRIAL FIBRILLATION) (HCC): ICD-10-CM

## 2023-05-11 DIAGNOSIS — D64.9 ANEMIA, UNSPECIFIED TYPE: ICD-10-CM

## 2023-05-11 DIAGNOSIS — I35.0 NONRHEUMATIC AORTIC VALVE STENOSIS: ICD-10-CM

## 2023-05-11 DIAGNOSIS — E55.9 VITAMIN D DEFICIENCY: ICD-10-CM

## 2023-05-11 DIAGNOSIS — I50.22 CHRONIC SYSTOLIC CHF (CONGESTIVE HEART FAILURE), NYHA CLASS 3 (HCC): Primary | ICD-10-CM

## 2023-05-11 PROCEDURE — 1123F ACP DISCUSS/DSCN MKR DOCD: CPT | Performed by: CLINICAL NURSE SPECIALIST

## 2023-05-11 PROCEDURE — 99214 OFFICE O/P EST MOD 30 MIN: CPT | Performed by: CLINICAL NURSE SPECIALIST

## 2023-05-11 PROCEDURE — 3078F DIAST BP <80 MM HG: CPT | Performed by: CLINICAL NURSE SPECIALIST

## 2023-05-11 PROCEDURE — 3074F SYST BP LT 130 MM HG: CPT | Performed by: CLINICAL NURSE SPECIALIST

## 2023-05-11 RX ORDER — METOLAZONE 5 MG/1
5 TABLET ORAL
Qty: 30 TABLET | Refills: 1 | Status: SHIPPED | OUTPATIENT
Start: 2023-05-11

## 2023-05-11 NOTE — PROGRESS NOTES
moderately   elevated at 46 mmHg assuming a right atrial pressure of 8 mmHg    11/30/2020 Dobutamine Echo   Dobutamine echocardiogram for aortic stenosis. Very technically limited exam due to atrial fibrillation. Baseline echocardiogram shows severe left ventricular dysfunction with   anterior, lateral and apical hypokinesis with an ejection fraction of 20-25%. There is no improvement in wall motion with low or high dose dobutamine   suggestive of nonviable myocardium. Mild prosthetic aortic valve stenosis with a mean gradient of 16mmHg and   peak velocity of 2.47m/s at rest. After dobutamine stress the mean AV   gradient rises to 20mmHg with 20mcg of dobutamine consistent with mild to   moderate aortic stenosis. Prosthetic mitral valve with a mean gradient of 7mmHg        JUDE 10/21/2020  Mildly dilated left ventricular size and normal wall thickness. Global left ventricular function is severely decreased with ejection fraction estimated at 25%. Patient is in atrial fibrillation during procedure. The bioprosthetic mitral valve is well seated with a mean gradient of 5mmHg and maximum pressure gradient of 15 mmHg with heart rate of 89 bpm. Pressure half time of 82 ms. There is trivial mitral regurgitation. Left atrial enlargement. Spontaneous echo contrast seen in the left atrium. A large stump of the left atrial appendage with no thrombus noted. Patient has history of surgical ligation of the left atrial appendage. There are spontaneous echo contrast in the atrial appendage. The aortic valve is thickened/calcified with decreased leaflet mobility consistent with aortic stenosis. There is trivial aortic insufficiency. There is moderate tricuspid regurgitation. ECHO 9/15/20  Left ventricular cavity size is dilated. There is normal wall thickness with a moderately severe reduction in   systolic function. LV ejection fraction is visually estimated to be 25-30%.    Indeterminate

## 2023-05-11 NOTE — PATIENT INSTRUCTIONS
Take the torsemide 40 mg every morning  Increase metolazone to 5 mg twice a week  Continue all other medications  Appt with Dr Kenzie Roberto for aortic valve  RTO in 3-4 months  Blood work in 2 month

## 2023-05-15 ENCOUNTER — OFFICE VISIT (OUTPATIENT)
Dept: FAMILY MEDICINE CLINIC | Age: 78
End: 2023-05-15

## 2023-05-15 VITALS — OXYGEN SATURATION: 96 % | TEMPERATURE: 97.4 F | HEART RATE: 82 BPM

## 2023-05-15 DIAGNOSIS — J45.41 ASTHMATIC BRONCHITIS WITH EXACERBATION, MODERATE PERSISTENT: Primary | ICD-10-CM

## 2023-05-15 RX ORDER — PREDNISONE 20 MG/1
TABLET ORAL
Qty: 15 TABLET | Refills: 0 | Status: SHIPPED | OUTPATIENT
Start: 2023-05-15

## 2023-05-15 RX ORDER — CEPHALEXIN 500 MG/1
500 CAPSULE ORAL 3 TIMES DAILY
Qty: 21 CAPSULE | Refills: 0 | Status: SHIPPED | OUTPATIENT
Start: 2023-05-15 | End: 2023-05-22

## 2023-05-15 RX ORDER — FLUCONAZOLE 150 MG/1
150 TABLET ORAL WEEKLY
Qty: 2 TABLET | Refills: 0 | Status: SHIPPED | OUTPATIENT
Start: 2023-05-15 | End: 2023-05-23

## 2023-05-15 ASSESSMENT — PATIENT HEALTH QUESTIONNAIRE - PHQ9
1. LITTLE INTEREST OR PLEASURE IN DOING THINGS: 0
SUM OF ALL RESPONSES TO PHQ QUESTIONS 1-9: 0
SUM OF ALL RESPONSES TO PHQ9 QUESTIONS 1 & 2: 0
2. FEELING DOWN, DEPRESSED OR HOPELESS: 0

## 2023-05-15 NOTE — PROGRESS NOTES
Subjective:      Patient ID: Landry Mohamud is a 69 y.o. female. HPI patient presents in accompaniment of  with respiratory congestion that has been persistent for a week. She has been using nebulizer equipment at home that gives her some improvement. The cough was more clear over the last 48 hours turned more productive with yellow-green sputum. She denies any fevers or chills. Review of Systems  Allergies   Allergen Reactions    Voetadic-Cyrwmhk-Gtebsu [Fluocinolone] Shortness Of Breath    Ciprofloxacin Shortness Of Breath    Diovan [Valsartan] Shortness Of Breath    Flagyl [Metronidazole] Shortness Of Breath     Has taken diflucan at home 12/7/15    Metformin And Related [Metformin And Related] Shortness Of Breath    Benazepril      Other reaction(s): Not Recorded    Morphine      Bad reaction. \"makes her feel horrible\". Saxagliptin      Other reaction(s): Not Recorded    Levaquin [Levofloxacin] Rash     Vitals:    05/15/23 1438   Pulse: 82   Temp: 97.4 °F (36.3 °C)   SpO2: 96%       Objective:   Physical Exam  Constitutional:       General: She is not in acute distress. HENT:      Right Ear: Tympanic membrane normal.      Left Ear: Tympanic membrane normal.      Nose: Nose normal.      Mouth/Throat:      Pharynx: Uvula midline. Pulmonary:      Effort: Pulmonary effort is normal. No respiratory distress. Breath sounds: Wheezing and rhonchi present. No decreased breath sounds. Chest:      Chest wall: No tenderness. Musculoskeletal:      Cervical back: Neck supple. Lymphadenopathy:      Cervical: No cervical adenopathy. Neurological:      Mental Status: She is alert. Assessment:      Alayna Fowler was seen today for cough. Diagnoses and all orders for this visit:    Asthmatic bronchitis with exacerbation, moderate persistent    Other orders  -     predniSONE (DELTASONE) 20 MG tablet; 1 TID for 3 day then 1 BID  -     cephALEXin (KEFLEX) 500 MG capsule;  Take 1 capsule by mouth

## 2023-05-18 DIAGNOSIS — E11.22 TYPE 2 DIABETES MELLITUS WITH STAGE 3B CHRONIC KIDNEY DISEASE, WITH LONG-TERM CURRENT USE OF INSULIN (HCC): ICD-10-CM

## 2023-05-18 DIAGNOSIS — Z79.4 TYPE 2 DIABETES MELLITUS WITH STAGE 3B CHRONIC KIDNEY DISEASE, WITH LONG-TERM CURRENT USE OF INSULIN (HCC): ICD-10-CM

## 2023-05-18 DIAGNOSIS — N18.32 TYPE 2 DIABETES MELLITUS WITH STAGE 3B CHRONIC KIDNEY DISEASE, WITH LONG-TERM CURRENT USE OF INSULIN (HCC): ICD-10-CM

## 2023-05-19 LAB
EST. AVERAGE GLUCOSE BLD GHB EST-MCNC: 125.5 MG/DL
HBA1C MFR BLD: 6 %

## 2023-05-19 RX ORDER — AZITHROMYCIN 500 MG/1
500 TABLET, FILM COATED ORAL DAILY
Qty: 5 TABLET | Refills: 0 | Status: SHIPPED | OUTPATIENT
Start: 2023-05-19 | End: 2023-05-24

## 2023-05-22 ENCOUNTER — NURSE ONLY (OUTPATIENT)
Dept: CARDIOLOGY CLINIC | Age: 78
End: 2023-05-22

## 2023-05-22 DIAGNOSIS — I42.9 CARDIOMYOPATHY, UNSPECIFIED TYPE (HCC): Primary | ICD-10-CM

## 2023-05-22 DIAGNOSIS — I50.22 CHRONIC SYSTOLIC CHF (CONGESTIVE HEART FAILURE), NYHA CLASS 3 (HCC): ICD-10-CM

## 2023-05-22 DIAGNOSIS — Z95.810 ICD (IMPLANTABLE CARDIOVERTER-DEFIBRILLATOR) IN PLACE: ICD-10-CM

## 2023-05-22 NOTE — PROGRESS NOTES
Remote transmission received from patient's dual chamber ICD home monitor. Transmission shows normal sensing and pacing function. No arrhythmias/ or events. Possible Optivol fluid accumulation 1/10/23 --- ongoing. Currently elevated around 200, above threshold, with correlating TI trend below reference line. TriageHF Heart Failure Risk Status on 22-May-2023 is High    NP will review. See interrogation under cardiology tab in the [facility] [place] [address] field for more details. Will continue to monitor remotely. (End of 31-day monitoring period 5/22/23).

## 2023-05-23 ENCOUNTER — HOSPITAL ENCOUNTER (INPATIENT)
Age: 78
LOS: 16 days | Discharge: HOME | DRG: 177 | End: 2023-06-08
Attending: HOSPITALIST | Admitting: HOSPITALIST
Payer: MEDICARE

## 2023-05-23 ENCOUNTER — OFFICE VISIT (OUTPATIENT)
Dept: CARDIOLOGY CLINIC | Age: 78
End: 2023-05-23
Payer: MEDICARE

## 2023-05-23 ENCOUNTER — TELEPHONE (OUTPATIENT)
Dept: CARDIOLOGY CLINIC | Age: 78
End: 2023-05-23

## 2023-05-23 ENCOUNTER — APPOINTMENT (OUTPATIENT)
Dept: GENERAL RADIOLOGY | Age: 78
DRG: 177 | End: 2023-05-23
Payer: MEDICARE

## 2023-05-23 VITALS
WEIGHT: 198 LBS | HEART RATE: 95 BPM | SYSTOLIC BLOOD PRESSURE: 120 MMHG | BODY MASS INDEX: 31.82 KG/M2 | HEIGHT: 66 IN | OXYGEN SATURATION: 91 % | DIASTOLIC BLOOD PRESSURE: 60 MMHG

## 2023-05-23 DIAGNOSIS — Z95.810 ICD (IMPLANTABLE CARDIOVERTER-DEFIBRILLATOR), DUAL, IN SITU: ICD-10-CM

## 2023-05-23 DIAGNOSIS — I35.0 NONRHEUMATIC AORTIC VALVE STENOSIS: ICD-10-CM

## 2023-05-23 DIAGNOSIS — I95.9 HYPOTENSION, UNSPECIFIED HYPOTENSION TYPE: ICD-10-CM

## 2023-05-23 DIAGNOSIS — N28.9 RENAL INSUFFICIENCY: ICD-10-CM

## 2023-05-23 DIAGNOSIS — J18.9 PNEUMONIA OF RIGHT LOWER LOBE DUE TO INFECTIOUS ORGANISM: Primary | ICD-10-CM

## 2023-05-23 DIAGNOSIS — R77.8 ELEVATED TROPONIN: ICD-10-CM

## 2023-05-23 DIAGNOSIS — R06.02 SOB (SHORTNESS OF BREATH): Primary | ICD-10-CM

## 2023-05-23 DIAGNOSIS — I48.0 PAF (PAROXYSMAL ATRIAL FIBRILLATION) (HCC): ICD-10-CM

## 2023-05-23 DIAGNOSIS — I50.22 CHRONIC SYSTOLIC CHF (CONGESTIVE HEART FAILURE), NYHA CLASS 3 (HCC): ICD-10-CM

## 2023-05-23 DIAGNOSIS — I50.9 ACUTE ON CHRONIC CONGESTIVE HEART FAILURE, UNSPECIFIED HEART FAILURE TYPE (HCC): ICD-10-CM

## 2023-05-23 PROBLEM — J15.9 BACTERIAL PNEUMONIA: Status: ACTIVE | Noted: 2023-05-23

## 2023-05-23 LAB
ALBUMIN SERPL-MCNC: 3.2 G/DL (ref 3.4–5)
ALBUMIN/GLOB SERPL: 0.9 {RATIO} (ref 1.1–2.2)
ALP SERPL-CCNC: 103 U/L (ref 40–129)
ALT SERPL-CCNC: 21 U/L (ref 10–40)
ANION GAP SERPL CALCULATED.3IONS-SCNC: 10 MMOL/L (ref 3–16)
ANISOCYTOSIS BLD QL SMEAR: ABNORMAL
APTT BLD: 31.9 SEC (ref 22.7–35.9)
AST SERPL-CCNC: 18 U/L (ref 15–37)
BASOPHILS # BLD: 0 K/UL (ref 0–0.2)
BASOPHILS NFR BLD: 0 %
BILIRUB SERPL-MCNC: 1.1 MG/DL (ref 0–1)
BUN SERPL-MCNC: 37 MG/DL (ref 7–20)
CALCIUM SERPL-MCNC: 9.1 MG/DL (ref 8.3–10.6)
CHLORIDE SERPL-SCNC: 97 MMOL/L (ref 99–110)
CO2 SERPL-SCNC: 32 MMOL/L (ref 21–32)
CREAT SERPL-MCNC: 1.3 MG/DL (ref 0.6–1.2)
DEPRECATED RDW RBC AUTO: 16.6 % (ref 12.4–15.4)
EOSINOPHIL # BLD: 0 K/UL (ref 0–0.6)
EOSINOPHIL NFR BLD: 0 %
GFR SERPLBLD CREATININE-BSD FMLA CKD-EPI: 43 ML/MIN/{1.73_M2}
GLUCOSE BLD-MCNC: 158 MG/DL (ref 70–99)
GLUCOSE SERPL-MCNC: 148 MG/DL (ref 70–99)
HCT VFR BLD AUTO: 35.6 % (ref 36–48)
HGB BLD-MCNC: 11.5 G/DL (ref 12–16)
INR PPP: 1.69 (ref 0.84–1.16)
LACTATE BLDV-SCNC: 1 MMOL/L (ref 0.4–1.9)
LYMPHOCYTES # BLD: 0.5 K/UL (ref 1–5.1)
LYMPHOCYTES NFR BLD: 3 %
MCH RBC QN AUTO: 29.3 PG (ref 26–34)
MCHC RBC AUTO-ENTMCNC: 32.3 G/DL (ref 31–36)
MCV RBC AUTO: 90.9 FL (ref 80–100)
MONOCYTES # BLD: 1.8 K/UL (ref 0–1.3)
MONOCYTES NFR BLD: 10 %
NEUTROPHILS # BLD: 15.8 K/UL (ref 1.7–7.7)
NEUTROPHILS NFR BLD: 87 %
NT-PROBNP SERPL-MCNC: ABNORMAL PG/ML (ref 0–449)
PERFORMED ON: ABNORMAL
PLATELET # BLD AUTO: 274 K/UL (ref 135–450)
PLATELET BLD QL SMEAR: ADEQUATE
PMV BLD AUTO: 9.7 FL (ref 5–10.5)
POTASSIUM SERPL-SCNC: 3.4 MMOL/L (ref 3.5–5.1)
PROCALCITONIN SERPL IA-MCNC: 0.19 NG/ML (ref 0–0.15)
PROT SERPL-MCNC: 6.6 G/DL (ref 6.4–8.2)
PROTHROMBIN TIME: 19.8 SEC (ref 11.5–14.8)
RBC # BLD AUTO: 3.92 M/UL (ref 4–5.2)
SLIDE REVIEW: ABNORMAL
SODIUM SERPL-SCNC: 139 MMOL/L (ref 136–145)
TROPONIN, HIGH SENSITIVITY: 31 NG/L (ref 0–14)
WBC # BLD AUTO: 18.2 K/UL (ref 4–11)

## 2023-05-23 PROCEDURE — 2580000003 HC RX 258: Performed by: HOSPITALIST

## 2023-05-23 PROCEDURE — 99285 EMERGENCY DEPT VISIT HI MDM: CPT

## 2023-05-23 PROCEDURE — 85025 COMPLETE CBC W/AUTO DIFF WBC: CPT

## 2023-05-23 PROCEDURE — 94640 AIRWAY INHALATION TREATMENT: CPT

## 2023-05-23 PROCEDURE — 3074F SYST BP LT 130 MM HG: CPT | Performed by: CLINICAL NURSE SPECIALIST

## 2023-05-23 PROCEDURE — 2580000003 HC RX 258: Performed by: PHYSICIAN ASSISTANT

## 2023-05-23 PROCEDURE — 1200000000 HC SEMI PRIVATE

## 2023-05-23 PROCEDURE — 85730 THROMBOPLASTIN TIME PARTIAL: CPT

## 2023-05-23 PROCEDURE — 84484 ASSAY OF TROPONIN QUANT: CPT

## 2023-05-23 PROCEDURE — 84145 PROCALCITONIN (PCT): CPT

## 2023-05-23 PROCEDURE — 6370000000 HC RX 637 (ALT 250 FOR IP): Performed by: PHYSICIAN ASSISTANT

## 2023-05-23 PROCEDURE — 83605 ASSAY OF LACTIC ACID: CPT

## 2023-05-23 PROCEDURE — 93005 ELECTROCARDIOGRAM TRACING: CPT | Performed by: PHYSICIAN ASSISTANT

## 2023-05-23 PROCEDURE — 6370000000 HC RX 637 (ALT 250 FOR IP): Performed by: HOSPITALIST

## 2023-05-23 PROCEDURE — 6360000002 HC RX W HCPCS: Performed by: HOSPITALIST

## 2023-05-23 PROCEDURE — 6360000002 HC RX W HCPCS: Performed by: PHYSICIAN ASSISTANT

## 2023-05-23 PROCEDURE — 94761 N-INVAS EAR/PLS OXIMETRY MLT: CPT

## 2023-05-23 PROCEDURE — 80053 COMPREHEN METABOLIC PANEL: CPT

## 2023-05-23 PROCEDURE — 2700000000 HC OXYGEN THERAPY PER DAY

## 2023-05-23 PROCEDURE — 83880 ASSAY OF NATRIURETIC PEPTIDE: CPT

## 2023-05-23 PROCEDURE — 99215 OFFICE O/P EST HI 40 MIN: CPT | Performed by: CLINICAL NURSE SPECIALIST

## 2023-05-23 PROCEDURE — 85610 PROTHROMBIN TIME: CPT

## 2023-05-23 PROCEDURE — 3078F DIAST BP <80 MM HG: CPT | Performed by: CLINICAL NURSE SPECIALIST

## 2023-05-23 PROCEDURE — 71045 X-RAY EXAM CHEST 1 VIEW: CPT

## 2023-05-23 PROCEDURE — 1123F ACP DISCUSS/DSCN MKR DOCD: CPT | Performed by: CLINICAL NURSE SPECIALIST

## 2023-05-23 RX ORDER — POLYETHYLENE GLYCOL 3350 17 G/17G
17 POWDER, FOR SOLUTION ORAL DAILY PRN
Status: DISCONTINUED | OUTPATIENT
Start: 2023-05-23 | End: 2023-05-27

## 2023-05-23 RX ORDER — FUROSEMIDE 10 MG/ML
40 INJECTION INTRAMUSCULAR; INTRAVENOUS 2 TIMES DAILY
Status: DISCONTINUED | OUTPATIENT
Start: 2023-05-24 | End: 2023-05-24

## 2023-05-23 RX ORDER — MIDODRINE HYDROCHLORIDE 5 MG/1
2.5 TABLET ORAL
Status: DISCONTINUED | OUTPATIENT
Start: 2023-05-23 | End: 2023-06-01

## 2023-05-23 RX ORDER — LEVOTHYROXINE SODIUM 88 UG/1
88 TABLET ORAL
Status: DISCONTINUED | OUTPATIENT
Start: 2023-05-24 | End: 2023-06-08 | Stop reason: HOSPADM

## 2023-05-23 RX ORDER — ALBUTEROL SULFATE 90 UG/1
1 AEROSOL, METERED RESPIRATORY (INHALATION) EVERY 4 HOURS PRN
Status: DISCONTINUED | OUTPATIENT
Start: 2023-05-23 | End: 2023-06-08 | Stop reason: HOSPADM

## 2023-05-23 RX ORDER — DEXTROSE MONOHYDRATE 100 MG/ML
INJECTION, SOLUTION INTRAVENOUS CONTINUOUS PRN
Status: DISCONTINUED | OUTPATIENT
Start: 2023-05-23 | End: 2023-06-08 | Stop reason: HOSPADM

## 2023-05-23 RX ORDER — FUROSEMIDE 10 MG/ML
40 INJECTION INTRAMUSCULAR; INTRAVENOUS ONCE
Status: COMPLETED | OUTPATIENT
Start: 2023-05-23 | End: 2023-05-23

## 2023-05-23 RX ORDER — INSULIN GLARGINE 100 [IU]/ML
6 INJECTION, SOLUTION SUBCUTANEOUS EVERY MORNING
Status: DISCONTINUED | OUTPATIENT
Start: 2023-05-24 | End: 2023-05-25

## 2023-05-23 RX ORDER — POTASSIUM CHLORIDE 7.45 MG/ML
10 INJECTION INTRAVENOUS PRN
Status: DISCONTINUED | OUTPATIENT
Start: 2023-05-23 | End: 2023-05-24

## 2023-05-23 RX ORDER — AMIODARONE HYDROCHLORIDE 200 MG/1
100 TABLET ORAL DAILY
Status: DISCONTINUED | OUTPATIENT
Start: 2023-05-24 | End: 2023-06-08 | Stop reason: HOSPADM

## 2023-05-23 RX ORDER — WARFARIN SODIUM 5 MG/1
5 TABLET ORAL
Status: DISCONTINUED | OUTPATIENT
Start: 2023-05-23 | End: 2023-05-26

## 2023-05-23 RX ORDER — ONDANSETRON 4 MG/1
4 TABLET, ORALLY DISINTEGRATING ORAL EVERY 8 HOURS PRN
Status: DISCONTINUED | OUTPATIENT
Start: 2023-05-23 | End: 2023-06-08 | Stop reason: HOSPADM

## 2023-05-23 RX ORDER — IPRATROPIUM BROMIDE AND ALBUTEROL SULFATE 2.5; .5 MG/3ML; MG/3ML
1 SOLUTION RESPIRATORY (INHALATION)
Status: DISCONTINUED | OUTPATIENT
Start: 2023-05-23 | End: 2023-05-24

## 2023-05-23 RX ORDER — WARFARIN SODIUM 2.5 MG/1
2.5 TABLET ORAL
Status: DISCONTINUED | OUTPATIENT
Start: 2023-05-26 | End: 2023-05-26

## 2023-05-23 RX ORDER — ACETAMINOPHEN 325 MG/1
650 TABLET ORAL EVERY 6 HOURS PRN
Status: DISCONTINUED | OUTPATIENT
Start: 2023-05-23 | End: 2023-06-08 | Stop reason: HOSPADM

## 2023-05-23 RX ORDER — ONDANSETRON 2 MG/ML
4 INJECTION INTRAMUSCULAR; INTRAVENOUS EVERY 6 HOURS PRN
Status: DISCONTINUED | OUTPATIENT
Start: 2023-05-23 | End: 2023-06-08 | Stop reason: HOSPADM

## 2023-05-23 RX ORDER — POTASSIUM CHLORIDE 20 MEQ/1
40 TABLET, EXTENDED RELEASE ORAL PRN
Status: DISCONTINUED | OUTPATIENT
Start: 2023-05-23 | End: 2023-05-24

## 2023-05-23 RX ORDER — IPRATROPIUM BROMIDE AND ALBUTEROL SULFATE 2.5; .5 MG/3ML; MG/3ML
1 SOLUTION RESPIRATORY (INHALATION) ONCE
Status: COMPLETED | OUTPATIENT
Start: 2023-05-23 | End: 2023-05-23

## 2023-05-23 RX ORDER — METOPROLOL SUCCINATE 50 MG/1
50 TABLET, EXTENDED RELEASE ORAL DAILY
Status: DISCONTINUED | OUTPATIENT
Start: 2023-05-23 | End: 2023-06-08 | Stop reason: HOSPADM

## 2023-05-23 RX ORDER — FUROSEMIDE 10 MG/ML
40 INJECTION INTRAMUSCULAR; INTRAVENOUS 2 TIMES DAILY
Status: DISCONTINUED | OUTPATIENT
Start: 2023-05-23 | End: 2023-05-23

## 2023-05-23 RX ORDER — CALCITRIOL 0.25 UG/1
0.25 CAPSULE, LIQUID FILLED ORAL EVERY EVENING
Status: DISCONTINUED | OUTPATIENT
Start: 2023-05-23 | End: 2023-06-08 | Stop reason: HOSPADM

## 2023-05-23 RX ORDER — SODIUM CHLORIDE 9 MG/ML
INJECTION, SOLUTION INTRAVENOUS PRN
Status: DISCONTINUED | OUTPATIENT
Start: 2023-05-23 | End: 2023-06-08 | Stop reason: HOSPADM

## 2023-05-23 RX ORDER — ENOXAPARIN SODIUM 100 MG/ML
40 INJECTION SUBCUTANEOUS NIGHTLY
Status: DISCONTINUED | OUTPATIENT
Start: 2023-05-23 | End: 2023-05-28 | Stop reason: ALTCHOICE

## 2023-05-23 RX ORDER — INSULIN LISPRO 100 [IU]/ML
0-4 INJECTION, SOLUTION INTRAVENOUS; SUBCUTANEOUS NIGHTLY
Status: DISCONTINUED | OUTPATIENT
Start: 2023-05-23 | End: 2023-06-04

## 2023-05-23 RX ORDER — INSULIN LISPRO 100 [IU]/ML
0-8 INJECTION, SOLUTION INTRAVENOUS; SUBCUTANEOUS
Status: DISCONTINUED | OUTPATIENT
Start: 2023-05-23 | End: 2023-06-04

## 2023-05-23 RX ORDER — ASPIRIN 81 MG/1
81 TABLET, CHEWABLE ORAL DAILY
Status: DISCONTINUED | OUTPATIENT
Start: 2023-05-24 | End: 2023-06-08 | Stop reason: HOSPADM

## 2023-05-23 RX ORDER — SODIUM CHLORIDE 0.9 % (FLUSH) 0.9 %
5-40 SYRINGE (ML) INJECTION PRN
Status: DISCONTINUED | OUTPATIENT
Start: 2023-05-23 | End: 2023-06-08 | Stop reason: HOSPADM

## 2023-05-23 RX ORDER — OXYCODONE HYDROCHLORIDE 5 MG/1
5 TABLET ORAL 3 TIMES DAILY PRN
Status: DISCONTINUED | OUTPATIENT
Start: 2023-05-23 | End: 2023-05-25

## 2023-05-23 RX ORDER — SODIUM CHLORIDE 0.9 % (FLUSH) 0.9 %
5-40 SYRINGE (ML) INJECTION EVERY 12 HOURS SCHEDULED
Status: DISCONTINUED | OUTPATIENT
Start: 2023-05-23 | End: 2023-06-08 | Stop reason: HOSPADM

## 2023-05-23 RX ORDER — ACETAMINOPHEN 650 MG/1
650 SUPPOSITORY RECTAL EVERY 6 HOURS PRN
Status: DISCONTINUED | OUTPATIENT
Start: 2023-05-23 | End: 2023-06-08 | Stop reason: HOSPADM

## 2023-05-23 RX ADMIN — OXYCODONE 5 MG: 5 TABLET ORAL at 18:56

## 2023-05-23 RX ADMIN — WARFARIN SODIUM 5 MG: 5 TABLET ORAL at 21:53

## 2023-05-23 RX ADMIN — CALCITRIOL CAPSULES 0.25 MCG 0.25 MCG: 0.25 CAPSULE ORAL at 22:06

## 2023-05-23 RX ADMIN — Medication 10 ML: at 22:10

## 2023-05-23 RX ADMIN — FUROSEMIDE 40 MG: 10 INJECTION, SOLUTION INTRAMUSCULAR; INTRAVENOUS at 16:19

## 2023-05-23 RX ADMIN — CEFTRIAXONE SODIUM 1000 MG: 1 INJECTION, POWDER, FOR SOLUTION INTRAMUSCULAR; INTRAVENOUS at 16:19

## 2023-05-23 RX ADMIN — IPRATROPIUM BROMIDE AND ALBUTEROL SULFATE 1 AMPULE: .5; 3 SOLUTION RESPIRATORY (INHALATION) at 14:45

## 2023-05-23 RX ADMIN — ENOXAPARIN SODIUM 40 MG: 100 INJECTION SUBCUTANEOUS at 21:50

## 2023-05-23 RX ADMIN — IPRATROPIUM BROMIDE AND ALBUTEROL SULFATE 1 AMPULE: .5; 3 SOLUTION RESPIRATORY (INHALATION) at 21:22

## 2023-05-23 RX ADMIN — AZITHROMYCIN DIHYDRATE 500 MG: 500 INJECTION, POWDER, LYOPHILIZED, FOR SOLUTION INTRAVENOUS at 16:58

## 2023-05-23 RX ADMIN — MIDODRINE HYDROCHLORIDE 2.5 MG: 5 TABLET ORAL at 21:51

## 2023-05-23 ASSESSMENT — PAIN DESCRIPTION - LOCATION
LOCATION: RIB CAGE
LOCATION: BACK
LOCATION: BACK;RIB CAGE
LOCATION: BACK

## 2023-05-23 ASSESSMENT — ENCOUNTER SYMPTOMS
WHEEZING: 0
DIARRHEA: 0
COUGH: 1
ABDOMINAL PAIN: 0
RHINORRHEA: 0
SHORTNESS OF BREATH: 1
NAUSEA: 0
VOMITING: 0

## 2023-05-23 ASSESSMENT — PAIN - FUNCTIONAL ASSESSMENT: PAIN_FUNCTIONAL_ASSESSMENT: 0-10

## 2023-05-23 ASSESSMENT — PAIN SCALES - GENERAL
PAINLEVEL_OUTOF10: 5
PAINLEVEL_OUTOF10: 3
PAINLEVEL_OUTOF10: 6
PAINLEVEL_OUTOF10: 3
PAINLEVEL_OUTOF10: 7

## 2023-05-23 ASSESSMENT — PAIN DESCRIPTION - ORIENTATION
ORIENTATION: LEFT;RIGHT
ORIENTATION: UPPER;LOWER
ORIENTATION: UPPER;LOWER

## 2023-05-23 ASSESSMENT — PAIN DESCRIPTION - DESCRIPTORS
DESCRIPTORS: ACHING

## 2023-05-23 NOTE — PROGRESS NOTES
[address]  Progress Note    Primary Care Doctor:  Mari Marion MD    Chief Complaint   Patient presents with    Follow-up    Congestive Heart Failure    Shortness of Breath        History of Present Illness:  69 y.o. female with history of CAD/CABG in 2018, PAF with maze 2018, HTN, HLD, DVT/PE on coumadin, 2 CVs unsuccessful  2/26-3/10/21 for small bowel pneumatosis with loculated pneumoperitoneum (IV zoysn and TPN), acute on chronic sHF (torsemide decreased at discharge, aldactone was held and coreg dose decreased due to hypotension), TORIBIO on CKD, PAF  ICD placed 7/7/2021  covid (antibodies, steroids and antibiotics). She was on prednisone 11/16 to 12/8 (off for 2 days). 5/17-24/2022 for persistent cough, chest congestion pneumonia after failed po rounds of antibiotics, CT abdomen with pneumatosis and mesenteric gas and inflammation started on antibiotics which she has a couple days left. I had the pleasure of seeing Maria R Sparrow in follow up for urgent visit for cough and shortness of breath. She has systolic heart failure. She is in a wheel chair with her , Catalina Simon. Her oxygen sats is 91% and placed on 1.5 L of oxygen. She has completed 2 rounds of antibiotics by her PCP along with some oral prednisone. Her optival continues to be elevated, in NSR and she feels that her edema in her lower legs is improved since increasing the metolazone to twice a week (taken this morning). She had thick green then brown and now thick yellow/beige sputum. She denies any chest pain or palpitations but is very shortness of breath and her back is really bothering her with more pain.       Past Medical History:   has a past medical history of Asthma, Atrial fibrillation ([address] Eosinophilia, Hemoptysis, HIGH CHOLESTEROL, Hx of blood clots, Hypertension, Irregular heart beat, Other specified gastritis without mention of hemorrhage, Palpitations, Skin cancer, and Type II or unspecified type diabetes

## 2023-05-23 NOTE — TELEPHONE ENCOUNTER
----- Message from SOLEDAD Menchaca - CNS sent at 5/22/2023  1:14 PM EDT -----  Elbatatiana Rojas is still very elevated  See how her weight is doing as I increased her metolazone  thanks

## 2023-05-23 NOTE — PATIENT INSTRUCTIONS
Patient with pneumonia vs acute on chronic systolic heart failure exacerbation  She was placed on 1.5 L of oxygen   Report called to Janice in the ER and instructed to bring patient to room 29  Patient transported in wheelchair and oxygen with help of her , Ambrosio Ayoub  Report given to bedside nurse in ER

## 2023-05-23 NOTE — TELEPHONE ENCOUNTER
Spoke to patient's spouse she has been sob 1.5 weeks. She wants to be seen.  Spoke to [facility] she stated to come on in and we will squeeze them in

## 2023-05-24 PROBLEM — R79.89 ELEVATED TROPONIN: Status: ACTIVE | Noted: 2023-05-24

## 2023-05-24 PROBLEM — R77.8 ELEVATED TROPONIN: Status: ACTIVE | Noted: 2023-05-24

## 2023-05-24 PROBLEM — D64.9 ANEMIA: Status: ACTIVE | Noted: 2023-05-24

## 2023-05-24 PROBLEM — I50.9 ACUTE ON CHRONIC CONGESTIVE HEART FAILURE (HCC): Status: ACTIVE | Noted: 2023-05-24

## 2023-05-24 PROBLEM — J18.9 PNEUMONIA OF RIGHT LOWER LOBE DUE TO INFECTIOUS ORGANISM: Status: ACTIVE | Noted: 2023-05-23

## 2023-05-24 LAB
ANION GAP SERPL CALCULATED.3IONS-SCNC: 7 MMOL/L (ref 3–16)
BASOPHILS # BLD: 0 K/UL (ref 0–0.2)
BASOPHILS NFR BLD: 0.1 %
BUN SERPL-MCNC: 38 MG/DL (ref 7–20)
CALCIUM SERPL-MCNC: 9.2 MG/DL (ref 8.3–10.6)
CHLORIDE SERPL-SCNC: 96 MMOL/L (ref 99–110)
CO2 SERPL-SCNC: 37 MMOL/L (ref 21–32)
CREAT SERPL-MCNC: 1.6 MG/DL (ref 0.6–1.2)
DEPRECATED RDW RBC AUTO: 16.5 % (ref 12.4–15.4)
EKG ATRIAL RATE: 91 BPM
EKG DIAGNOSIS: NORMAL
EKG Q-T INTERVAL: 318 MS
EKG QRS DURATION: 88 MS
EKG QTC CALCULATION (BAZETT): 420 MS
EKG R AXIS: 20 DEGREES
EKG T AXIS: 121 DEGREES
EKG VENTRICULAR RATE: 105 BPM
EOSINOPHIL # BLD: 0.1 K/UL (ref 0–0.6)
EOSINOPHIL NFR BLD: 0.4 %
GFR SERPLBLD CREATININE-BSD FMLA CKD-EPI: 33 ML/MIN/{1.73_M2}
GLUCOSE BLD-MCNC: 182 MG/DL (ref 70–99)
GLUCOSE BLD-MCNC: 194 MG/DL (ref 70–99)
GLUCOSE BLD-MCNC: 234 MG/DL (ref 70–99)
GLUCOSE BLD-MCNC: 243 MG/DL (ref 70–99)
GLUCOSE SERPL-MCNC: 169 MG/DL (ref 70–99)
HCT VFR BLD AUTO: 33.6 % (ref 36–48)
HGB BLD-MCNC: 10.7 G/DL (ref 12–16)
INR PPP: 1.64 (ref 0.84–1.16)
LYMPHOCYTES # BLD: 0.7 K/UL (ref 1–5.1)
LYMPHOCYTES NFR BLD: 5.9 %
MAGNESIUM SERPL-MCNC: 2.1 MG/DL (ref 1.8–2.4)
MAGNESIUM SERPL-MCNC: 2.2 MG/DL (ref 1.8–2.4)
MCH RBC QN AUTO: 28.8 PG (ref 26–34)
MCHC RBC AUTO-ENTMCNC: 31.8 G/DL (ref 31–36)
MCV RBC AUTO: 90.4 FL (ref 80–100)
MONOCYTES # BLD: 0.9 K/UL (ref 0–1.3)
MONOCYTES NFR BLD: 6.8 %
NEUTROPHILS # BLD: 10.8 K/UL (ref 1.7–7.7)
NEUTROPHILS NFR BLD: 86.8 %
PERFORMED ON: ABNORMAL
PLATELET # BLD AUTO: 240 K/UL (ref 135–450)
PMV BLD AUTO: 8.9 FL (ref 5–10.5)
POTASSIUM SERPL-SCNC: 3 MMOL/L (ref 3.5–5.1)
POTASSIUM SERPL-SCNC: 3.1 MMOL/L (ref 3.5–5.1)
PROTHROMBIN TIME: 19.4 SEC (ref 11.5–14.8)
RBC # BLD AUTO: 3.71 M/UL (ref 4–5.2)
SODIUM SERPL-SCNC: 140 MMOL/L (ref 136–145)
WBC # BLD AUTO: 12.5 K/UL (ref 4–11)

## 2023-05-24 PROCEDURE — 94640 AIRWAY INHALATION TREATMENT: CPT

## 2023-05-24 PROCEDURE — 97535 SELF CARE MNGMENT TRAINING: CPT

## 2023-05-24 PROCEDURE — 1200000000 HC SEMI PRIVATE

## 2023-05-24 PROCEDURE — 6370000000 HC RX 637 (ALT 250 FOR IP): Performed by: HOSPITALIST

## 2023-05-24 PROCEDURE — 84132 ASSAY OF SERUM POTASSIUM: CPT

## 2023-05-24 PROCEDURE — 83036 HEMOGLOBIN GLYCOSYLATED A1C: CPT

## 2023-05-24 PROCEDURE — 97161 PT EVAL LOW COMPLEX 20 MIN: CPT

## 2023-05-24 PROCEDURE — 85610 PROTHROMBIN TIME: CPT

## 2023-05-24 PROCEDURE — 2580000003 HC RX 258: Performed by: HOSPITALIST

## 2023-05-24 PROCEDURE — 6360000002 HC RX W HCPCS: Performed by: PHYSICIAN ASSISTANT

## 2023-05-24 PROCEDURE — 6370000000 HC RX 637 (ALT 250 FOR IP): Performed by: NURSE PRACTITIONER

## 2023-05-24 PROCEDURE — 97530 THERAPEUTIC ACTIVITIES: CPT

## 2023-05-24 PROCEDURE — 36415 COLL VENOUS BLD VENIPUNCTURE: CPT

## 2023-05-24 PROCEDURE — 83735 ASSAY OF MAGNESIUM: CPT

## 2023-05-24 PROCEDURE — 2700000000 HC OXYGEN THERAPY PER DAY

## 2023-05-24 PROCEDURE — 80048 BASIC METABOLIC PNL TOTAL CA: CPT

## 2023-05-24 PROCEDURE — 97166 OT EVAL MOD COMPLEX 45 MIN: CPT

## 2023-05-24 PROCEDURE — 94669 MECHANICAL CHEST WALL OSCILL: CPT

## 2023-05-24 PROCEDURE — 93010 ELECTROCARDIOGRAM REPORT: CPT | Performed by: INTERNAL MEDICINE

## 2023-05-24 PROCEDURE — 2580000003 HC RX 258: Performed by: PHYSICIAN ASSISTANT

## 2023-05-24 PROCEDURE — 82728 ASSAY OF FERRITIN: CPT

## 2023-05-24 PROCEDURE — 87070 CULTURE OTHR SPECIMN AEROBIC: CPT

## 2023-05-24 PROCEDURE — 99223 1ST HOSP IP/OBS HIGH 75: CPT | Performed by: INTERNAL MEDICINE

## 2023-05-24 PROCEDURE — 85025 COMPLETE CBC W/AUTO DIFF WBC: CPT

## 2023-05-24 PROCEDURE — 6360000002 HC RX W HCPCS: Performed by: INTERNAL MEDICINE

## 2023-05-24 PROCEDURE — 97116 GAIT TRAINING THERAPY: CPT

## 2023-05-24 PROCEDURE — 83540 ASSAY OF IRON: CPT

## 2023-05-24 PROCEDURE — 83550 IRON BINDING TEST: CPT

## 2023-05-24 PROCEDURE — 6360000002 HC RX W HCPCS: Performed by: HOSPITALIST

## 2023-05-24 PROCEDURE — 94761 N-INVAS EAR/PLS OXIMETRY MLT: CPT

## 2023-05-24 PROCEDURE — 87205 SMEAR GRAM STAIN: CPT

## 2023-05-24 RX ORDER — IPRATROPIUM BROMIDE AND ALBUTEROL SULFATE 2.5; .5 MG/3ML; MG/3ML
1 SOLUTION RESPIRATORY (INHALATION) 2 TIMES DAILY
Status: DISCONTINUED | OUTPATIENT
Start: 2023-05-24 | End: 2023-05-24

## 2023-05-24 RX ORDER — IPRATROPIUM BROMIDE AND ALBUTEROL SULFATE 2.5; .5 MG/3ML; MG/3ML
1 SOLUTION RESPIRATORY (INHALATION) 4 TIMES DAILY
Status: DISCONTINUED | OUTPATIENT
Start: 2023-05-24 | End: 2023-05-24

## 2023-05-24 RX ORDER — IPRATROPIUM BROMIDE AND ALBUTEROL SULFATE 2.5; .5 MG/3ML; MG/3ML
1 SOLUTION RESPIRATORY (INHALATION) 2 TIMES DAILY
Status: DISCONTINUED | OUTPATIENT
Start: 2023-05-24 | End: 2023-06-02

## 2023-05-24 RX ORDER — FUROSEMIDE 10 MG/ML
40 INJECTION INTRAMUSCULAR; INTRAVENOUS DAILY
Status: DISCONTINUED | OUTPATIENT
Start: 2023-05-25 | End: 2023-05-24

## 2023-05-24 RX ORDER — SPIRONOLACTONE 25 MG/1
12.5 TABLET ORAL DAILY
Status: DISCONTINUED | OUTPATIENT
Start: 2023-05-25 | End: 2023-05-27

## 2023-05-24 RX ORDER — GUAIFENESIN 600 MG/1
1200 TABLET, EXTENDED RELEASE ORAL 2 TIMES DAILY
Status: DISCONTINUED | OUTPATIENT
Start: 2023-05-24 | End: 2023-05-29

## 2023-05-24 RX ORDER — FUROSEMIDE 10 MG/ML
40 INJECTION INTRAMUSCULAR; INTRAVENOUS 2 TIMES DAILY
Status: DISCONTINUED | OUTPATIENT
Start: 2023-05-24 | End: 2023-05-25

## 2023-05-24 RX ORDER — IPRATROPIUM BROMIDE AND ALBUTEROL SULFATE 2.5; .5 MG/3ML; MG/3ML
1 SOLUTION RESPIRATORY (INHALATION) EVERY 4 HOURS PRN
Status: DISCONTINUED | OUTPATIENT
Start: 2023-05-24 | End: 2023-06-08 | Stop reason: HOSPADM

## 2023-05-24 RX ADMIN — IPRATROPIUM BROMIDE AND ALBUTEROL SULFATE 1 AMPULE: .5; 3 SOLUTION RESPIRATORY (INHALATION) at 09:36

## 2023-05-24 RX ADMIN — AZITHROMYCIN DIHYDRATE 500 MG: 500 INJECTION, POWDER, LYOPHILIZED, FOR SOLUTION INTRAVENOUS at 16:35

## 2023-05-24 RX ADMIN — ACETAMINOPHEN 650 MG: 325 TABLET ORAL at 06:21

## 2023-05-24 RX ADMIN — MIDODRINE HYDROCHLORIDE 2.5 MG: 5 TABLET ORAL at 09:54

## 2023-05-24 RX ADMIN — METOPROLOL SUCCINATE 50 MG: 50 TABLET, EXTENDED RELEASE ORAL at 09:54

## 2023-05-24 RX ADMIN — FUROSEMIDE 40 MG: 10 INJECTION, SOLUTION INTRAMUSCULAR; INTRAVENOUS at 09:52

## 2023-05-24 RX ADMIN — AMIODARONE HYDROCHLORIDE 100 MG: 200 TABLET ORAL at 09:52

## 2023-05-24 RX ADMIN — ENOXAPARIN SODIUM 40 MG: 100 INJECTION SUBCUTANEOUS at 19:43

## 2023-05-24 RX ADMIN — OXYCODONE 5 MG: 5 TABLET ORAL at 10:42

## 2023-05-24 RX ADMIN — OXYCODONE 5 MG: 5 TABLET ORAL at 01:27

## 2023-05-24 RX ADMIN — LEVOTHYROXINE SODIUM 88 MCG: 0.09 TABLET ORAL at 06:21

## 2023-05-24 RX ADMIN — OXYCODONE 5 MG: 5 TABLET ORAL at 19:43

## 2023-05-24 RX ADMIN — MIDODRINE HYDROCHLORIDE 2.5 MG: 5 TABLET ORAL at 18:34

## 2023-05-24 RX ADMIN — INSULIN LISPRO 2 UNITS: 100 INJECTION, SOLUTION INTRAVENOUS; SUBCUTANEOUS at 16:36

## 2023-05-24 RX ADMIN — ASPIRIN 81 MG: 81 TABLET, CHEWABLE ORAL at 09:54

## 2023-05-24 RX ADMIN — WARFARIN SODIUM 5 MG: 5 TABLET ORAL at 18:34

## 2023-05-24 RX ADMIN — IPRATROPIUM BROMIDE AND ALBUTEROL SULFATE 1 AMPULE: .5; 3 SOLUTION RESPIRATORY (INHALATION) at 22:01

## 2023-05-24 RX ADMIN — Medication 10 ML: at 10:38

## 2023-05-24 RX ADMIN — CEFTRIAXONE SODIUM 1000 MG: 1 INJECTION, POWDER, FOR SOLUTION INTRAMUSCULAR; INTRAVENOUS at 18:41

## 2023-05-24 RX ADMIN — INSULIN GLARGINE 6 UNITS: 100 INJECTION, SOLUTION SUBCUTANEOUS at 09:51

## 2023-05-24 RX ADMIN — MIDODRINE HYDROCHLORIDE 2.5 MG: 5 TABLET ORAL at 12:34

## 2023-05-24 RX ADMIN — INSULIN LISPRO 2 UNITS: 100 INJECTION, SOLUTION INTRAVENOUS; SUBCUTANEOUS at 09:52

## 2023-05-24 RX ADMIN — POTASSIUM CHLORIDE 40 MEQ: 1500 TABLET, EXTENDED RELEASE ORAL at 20:30

## 2023-05-24 RX ADMIN — GUAIFENESIN 1200 MG: 600 TABLET, EXTENDED RELEASE ORAL at 12:34

## 2023-05-24 RX ADMIN — GUAIFENESIN 1200 MG: 600 TABLET, EXTENDED RELEASE ORAL at 19:43

## 2023-05-24 RX ADMIN — CALCITRIOL CAPSULES 0.25 MCG 0.25 MCG: 0.25 CAPSULE ORAL at 18:34

## 2023-05-24 RX ADMIN — FUROSEMIDE 40 MG: 10 INJECTION, SOLUTION INTRAMUSCULAR; INTRAVENOUS at 23:27

## 2023-05-24 ASSESSMENT — PAIN DESCRIPTION - ORIENTATION
ORIENTATION: LOWER;UPPER
ORIENTATION: OUTER;LOWER
ORIENTATION: DISTAL
ORIENTATION: UPPER;MID;LOWER

## 2023-05-24 ASSESSMENT — PAIN DESCRIPTION - FREQUENCY
FREQUENCY: CONTINUOUS
FREQUENCY: INTERMITTENT
FREQUENCY: INTERMITTENT

## 2023-05-24 ASSESSMENT — PAIN DESCRIPTION - PAIN TYPE
TYPE: CHRONIC PAIN
TYPE: CHRONIC PAIN

## 2023-05-24 ASSESSMENT — PAIN SCALES - GENERAL
PAINLEVEL_OUTOF10: 0
PAINLEVEL_OUTOF10: 9
PAINLEVEL_OUTOF10: 0
PAINLEVEL_OUTOF10: 0
PAINLEVEL_OUTOF10: 8
PAINLEVEL_OUTOF10: 9
PAINLEVEL_OUTOF10: 8

## 2023-05-24 ASSESSMENT — PAIN DESCRIPTION - DESCRIPTORS
DESCRIPTORS: SHARP

## 2023-05-24 ASSESSMENT — PAIN DESCRIPTION - LOCATION
LOCATION: BACK

## 2023-05-24 ASSESSMENT — PAIN DESCRIPTION - ONSET: ONSET: ON-GOING

## 2023-05-25 LAB
ANION GAP SERPL CALCULATED.3IONS-SCNC: 10 MMOL/L (ref 3–16)
BASOPHILS # BLD: 0 K/UL (ref 0–0.2)
BASOPHILS NFR BLD: 0.2 %
BUN SERPL-MCNC: 37 MG/DL (ref 7–20)
CALCIUM SERPL-MCNC: 8.6 MG/DL (ref 8.3–10.6)
CHLORIDE SERPL-SCNC: 94 MMOL/L (ref 99–110)
CO2 SERPL-SCNC: 32 MMOL/L (ref 21–32)
CREAT SERPL-MCNC: 1.6 MG/DL (ref 0.6–1.2)
DEPRECATED RDW RBC AUTO: 16.5 % (ref 12.4–15.4)
EOSINOPHIL # BLD: 0.1 K/UL (ref 0–0.6)
EOSINOPHIL NFR BLD: 1.1 %
EST. AVERAGE GLUCOSE BLD GHB EST-MCNC: 137 MG/DL
FERRITIN SERPL IA-MCNC: 183.9 NG/ML (ref 15–150)
GFR SERPLBLD CREATININE-BSD FMLA CKD-EPI: 33 ML/MIN/{1.73_M2}
GLUCOSE BLD-MCNC: 120 MG/DL (ref 70–99)
GLUCOSE BLD-MCNC: 176 MG/DL (ref 70–99)
GLUCOSE BLD-MCNC: 213 MG/DL (ref 70–99)
GLUCOSE BLD-MCNC: 241 MG/DL (ref 70–99)
GLUCOSE SERPL-MCNC: 226 MG/DL (ref 70–99)
HBA1C MFR BLD: 6.4 %
HCT VFR BLD AUTO: 31.9 % (ref 36–48)
HGB BLD-MCNC: 10.2 G/DL (ref 12–16)
INR PPP: 1.92 (ref 0.84–1.16)
IRON SATN MFR SERPL: 15 % (ref 15–50)
IRON SERPL-MCNC: 37 UG/DL (ref 37–145)
LV EF: 30 %
LVEF MODALITY: NORMAL
LYMPHOCYTES # BLD: 0.6 K/UL (ref 1–5.1)
LYMPHOCYTES NFR BLD: 5.6 %
MAGNESIUM SERPL-MCNC: 2.2 MG/DL (ref 1.8–2.4)
MCH RBC QN AUTO: 29.1 PG (ref 26–34)
MCHC RBC AUTO-ENTMCNC: 32 G/DL (ref 31–36)
MCV RBC AUTO: 90.7 FL (ref 80–100)
MONOCYTES # BLD: 0.9 K/UL (ref 0–1.3)
MONOCYTES NFR BLD: 8.1 %
NEUTROPHILS # BLD: 9.4 K/UL (ref 1.7–7.7)
NEUTROPHILS NFR BLD: 85 %
NT-PROBNP SERPL-MCNC: ABNORMAL PG/ML (ref 0–449)
PERFORMED ON: ABNORMAL
PLATELET # BLD AUTO: 230 K/UL (ref 135–450)
PMV BLD AUTO: 9.1 FL (ref 5–10.5)
POTASSIUM SERPL-SCNC: 3.2 MMOL/L (ref 3.5–5.1)
PROTHROMBIN TIME: 21.9 SEC (ref 11.5–14.8)
RBC # BLD AUTO: 3.52 M/UL (ref 4–5.2)
SODIUM SERPL-SCNC: 136 MMOL/L (ref 136–145)
TIBC SERPL-MCNC: 241 UG/DL (ref 260–445)
WBC # BLD AUTO: 11 K/UL (ref 4–11)

## 2023-05-25 PROCEDURE — 6370000000 HC RX 637 (ALT 250 FOR IP): Performed by: INTERNAL MEDICINE

## 2023-05-25 PROCEDURE — 1200000000 HC SEMI PRIVATE

## 2023-05-25 PROCEDURE — 36415 COLL VENOUS BLD VENIPUNCTURE: CPT

## 2023-05-25 PROCEDURE — 6370000000 HC RX 637 (ALT 250 FOR IP): Performed by: NURSE PRACTITIONER

## 2023-05-25 PROCEDURE — 2580000003 HC RX 258: Performed by: HOSPITALIST

## 2023-05-25 PROCEDURE — 6360000002 HC RX W HCPCS: Performed by: INTERNAL MEDICINE

## 2023-05-25 PROCEDURE — 2580000003 HC RX 258: Performed by: PHYSICIAN ASSISTANT

## 2023-05-25 PROCEDURE — C8929 TTE W OR WO FOL WCON,DOPPLER: HCPCS

## 2023-05-25 PROCEDURE — 6370000000 HC RX 637 (ALT 250 FOR IP): Performed by: HOSPITALIST

## 2023-05-25 PROCEDURE — 6360000002 HC RX W HCPCS: Performed by: NURSE PRACTITIONER

## 2023-05-25 PROCEDURE — 94761 N-INVAS EAR/PLS OXIMETRY MLT: CPT

## 2023-05-25 PROCEDURE — 99232 SBSQ HOSP IP/OBS MODERATE 35: CPT | Performed by: NURSE PRACTITIONER

## 2023-05-25 PROCEDURE — 6360000002 HC RX W HCPCS: Performed by: PHYSICIAN ASSISTANT

## 2023-05-25 PROCEDURE — 2700000000 HC OXYGEN THERAPY PER DAY

## 2023-05-25 PROCEDURE — 85025 COMPLETE CBC W/AUTO DIFF WBC: CPT

## 2023-05-25 PROCEDURE — 92610 EVALUATE SWALLOWING FUNCTION: CPT

## 2023-05-25 PROCEDURE — 6360000002 HC RX W HCPCS: Performed by: HOSPITALIST

## 2023-05-25 PROCEDURE — 80048 BASIC METABOLIC PNL TOTAL CA: CPT

## 2023-05-25 PROCEDURE — 2580000003 HC RX 258: Performed by: NURSE PRACTITIONER

## 2023-05-25 PROCEDURE — 85610 PROTHROMBIN TIME: CPT

## 2023-05-25 PROCEDURE — 94640 AIRWAY INHALATION TREATMENT: CPT

## 2023-05-25 PROCEDURE — 6360000004 HC RX CONTRAST MEDICATION: Performed by: NURSE PRACTITIONER

## 2023-05-25 PROCEDURE — 83735 ASSAY OF MAGNESIUM: CPT

## 2023-05-25 PROCEDURE — 83880 ASSAY OF NATRIURETIC PEPTIDE: CPT

## 2023-05-25 RX ORDER — POTASSIUM CHLORIDE 20 MEQ/1
40 TABLET, EXTENDED RELEASE ORAL ONCE
Status: DISCONTINUED | OUTPATIENT
Start: 2023-05-25 | End: 2023-05-25 | Stop reason: SDUPTHER

## 2023-05-25 RX ORDER — INSULIN LISPRO 100 [IU]/ML
3 INJECTION, SOLUTION INTRAVENOUS; SUBCUTANEOUS
Status: DISCONTINUED | OUTPATIENT
Start: 2023-05-25 | End: 2023-05-26

## 2023-05-25 RX ORDER — BENZONATATE 100 MG/1
200 CAPSULE ORAL 3 TIMES DAILY PRN
Status: DISCONTINUED | OUTPATIENT
Start: 2023-05-25 | End: 2023-05-28

## 2023-05-25 RX ORDER — POTASSIUM CHLORIDE 20 MEQ/1
40 TABLET, EXTENDED RELEASE ORAL ONCE
Status: COMPLETED | OUTPATIENT
Start: 2023-05-25 | End: 2023-05-25

## 2023-05-25 RX ORDER — OXYCODONE HYDROCHLORIDE 5 MG/1
5 TABLET ORAL EVERY 6 HOURS PRN
Status: DISCONTINUED | OUTPATIENT
Start: 2023-05-25 | End: 2023-06-06

## 2023-05-25 RX ORDER — INSULIN GLARGINE 100 [IU]/ML
15 INJECTION, SOLUTION SUBCUTANEOUS EVERY MORNING
Status: DISCONTINUED | OUTPATIENT
Start: 2023-05-26 | End: 2023-05-26

## 2023-05-25 RX ADMIN — OXYCODONE 5 MG: 5 TABLET ORAL at 04:19

## 2023-05-25 RX ADMIN — SPIRONOLACTONE 12.5 MG: 25 TABLET ORAL at 08:06

## 2023-05-25 RX ADMIN — BENZONATATE 200 MG: 100 CAPSULE ORAL at 21:58

## 2023-05-25 RX ADMIN — INSULIN LISPRO 3 UNITS: 100 INJECTION, SOLUTION INTRAVENOUS; SUBCUTANEOUS at 17:33

## 2023-05-25 RX ADMIN — INSULIN GLARGINE 6 UNITS: 100 INJECTION, SOLUTION SUBCUTANEOUS at 07:57

## 2023-05-25 RX ADMIN — ONDANSETRON 4 MG: 2 INJECTION INTRAMUSCULAR; INTRAVENOUS at 20:30

## 2023-05-25 RX ADMIN — FUROSEMIDE 40 MG: 10 INJECTION, SOLUTION INTRAMUSCULAR; INTRAVENOUS at 08:01

## 2023-05-25 RX ADMIN — ACETAMINOPHEN 650 MG: 325 TABLET ORAL at 02:14

## 2023-05-25 RX ADMIN — DICLOFENAC SODIUM 2 G: 10 GEL TOPICAL at 13:15

## 2023-05-25 RX ADMIN — OXYCODONE 5 MG: 5 TABLET ORAL at 12:56

## 2023-05-25 RX ADMIN — IRON SUCROSE 200 MG: 20 INJECTION, SOLUTION INTRAVENOUS at 13:06

## 2023-05-25 RX ADMIN — MIDODRINE HYDROCHLORIDE 2.5 MG: 5 TABLET ORAL at 17:40

## 2023-05-25 RX ADMIN — IPRATROPIUM BROMIDE AND ALBUTEROL SULFATE 1 AMPULE: .5; 3 SOLUTION RESPIRATORY (INHALATION) at 21:07

## 2023-05-25 RX ADMIN — Medication 10 ML: at 08:50

## 2023-05-25 RX ADMIN — LEVOTHYROXINE SODIUM 88 MCG: 0.09 TABLET ORAL at 06:54

## 2023-05-25 RX ADMIN — CEFTRIAXONE SODIUM 1000 MG: 1 INJECTION, POWDER, FOR SOLUTION INTRAMUSCULAR; INTRAVENOUS at 22:09

## 2023-05-25 RX ADMIN — POTASSIUM CHLORIDE 40 MEQ: 1500 TABLET, EXTENDED RELEASE ORAL at 12:56

## 2023-05-25 RX ADMIN — GUAIFENESIN 1200 MG: 600 TABLET, EXTENDED RELEASE ORAL at 07:57

## 2023-05-25 RX ADMIN — Medication 10 ML: at 22:01

## 2023-05-25 RX ADMIN — MIDODRINE HYDROCHLORIDE 2.5 MG: 5 TABLET ORAL at 12:56

## 2023-05-25 RX ADMIN — AZITHROMYCIN DIHYDRATE 500 MG: 500 INJECTION, POWDER, LYOPHILIZED, FOR SOLUTION INTRAVENOUS at 17:39

## 2023-05-25 RX ADMIN — ENOXAPARIN SODIUM 40 MG: 100 INJECTION SUBCUTANEOUS at 21:58

## 2023-05-25 RX ADMIN — POLYETHYLENE GLYCOL 3350 17 G: 17 POWDER, FOR SOLUTION ORAL at 13:37

## 2023-05-25 RX ADMIN — PERFLUTREN 1.5 ML: 6.52 INJECTION, SUSPENSION INTRAVENOUS at 12:34

## 2023-05-25 RX ADMIN — ASPIRIN 81 MG: 81 TABLET, CHEWABLE ORAL at 07:57

## 2023-05-25 RX ADMIN — MIDODRINE HYDROCHLORIDE 2.5 MG: 5 TABLET ORAL at 07:57

## 2023-05-25 RX ADMIN — AMIODARONE HYDROCHLORIDE 100 MG: 200 TABLET ORAL at 07:57

## 2023-05-25 RX ADMIN — FUROSEMIDE 5 MG/HR: 10 INJECTION, SOLUTION INTRAMUSCULAR; INTRAVENOUS at 23:58

## 2023-05-25 RX ADMIN — METOPROLOL SUCCINATE 50 MG: 50 TABLET, EXTENDED RELEASE ORAL at 07:57

## 2023-05-25 RX ADMIN — IPRATROPIUM BROMIDE AND ALBUTEROL SULFATE 1 AMPULE: .5; 3 SOLUTION RESPIRATORY (INHALATION) at 12:05

## 2023-05-25 RX ADMIN — WARFARIN SODIUM 5 MG: 5 TABLET ORAL at 17:31

## 2023-05-25 RX ADMIN — DICLOFENAC SODIUM 2 G: 10 GEL TOPICAL at 21:59

## 2023-05-25 RX ADMIN — INSULIN LISPRO 2 UNITS: 100 INJECTION, SOLUTION INTRAVENOUS; SUBCUTANEOUS at 07:58

## 2023-05-25 RX ADMIN — CALCITRIOL CAPSULES 0.25 MCG 0.25 MCG: 0.25 CAPSULE ORAL at 17:31

## 2023-05-25 RX ADMIN — INSULIN LISPRO 2 UNITS: 100 INJECTION, SOLUTION INTRAVENOUS; SUBCUTANEOUS at 17:32

## 2023-05-25 ASSESSMENT — PAIN SCALES - GENERAL
PAINLEVEL_OUTOF10: 8
PAINLEVEL_OUTOF10: 10
PAINLEVEL_OUTOF10: 4
PAINLEVEL_OUTOF10: 8
PAINLEVEL_OUTOF10: 10
PAINLEVEL_OUTOF10: 3

## 2023-05-25 ASSESSMENT — PAIN DESCRIPTION - LOCATION
LOCATION: BACK

## 2023-05-26 LAB
ANION GAP SERPL CALCULATED.3IONS-SCNC: 5 MMOL/L (ref 3–16)
BASOPHILS # BLD: 0 K/UL (ref 0–0.2)
BASOPHILS NFR BLD: 0.2 %
BUN SERPL-MCNC: 39 MG/DL (ref 7–20)
CALCIUM SERPL-MCNC: 9.1 MG/DL (ref 8.3–10.6)
CHLORIDE SERPL-SCNC: 100 MMOL/L (ref 99–110)
CO2 SERPL-SCNC: 36 MMOL/L (ref 21–32)
CREAT SERPL-MCNC: 1.7 MG/DL (ref 0.6–1.2)
DEPRECATED RDW RBC AUTO: 17.7 % (ref 12.4–15.4)
EOSINOPHIL # BLD: 0.1 K/UL (ref 0–0.6)
EOSINOPHIL NFR BLD: 1.4 %
GFR SERPLBLD CREATININE-BSD FMLA CKD-EPI: 31 ML/MIN/{1.73_M2}
GLUCOSE BLD-MCNC: 103 MG/DL (ref 70–99)
GLUCOSE BLD-MCNC: 149 MG/DL (ref 70–99)
GLUCOSE BLD-MCNC: 178 MG/DL (ref 70–99)
GLUCOSE BLD-MCNC: 185 MG/DL (ref 70–99)
GLUCOSE SERPL-MCNC: 138 MG/DL (ref 70–99)
HCT VFR BLD AUTO: 34.7 % (ref 36–48)
HGB BLD-MCNC: 10.6 G/DL (ref 12–16)
INR PPP: 2.27 (ref 0.84–1.16)
LYMPHOCYTES # BLD: 0.6 K/UL (ref 1–5.1)
LYMPHOCYTES NFR BLD: 8 %
MAGNESIUM SERPL-MCNC: 2.2 MG/DL (ref 1.8–2.4)
MCH RBC QN AUTO: 29.6 PG (ref 26–34)
MCHC RBC AUTO-ENTMCNC: 30.6 G/DL (ref 31–36)
MCV RBC AUTO: 96.6 FL (ref 80–100)
MONOCYTES # BLD: 0.8 K/UL (ref 0–1.3)
MONOCYTES NFR BLD: 9.4 %
NEUTROPHILS # BLD: 6.5 K/UL (ref 1.7–7.7)
NEUTROPHILS NFR BLD: 81 %
PERFORMED ON: ABNORMAL
PLATELET # BLD AUTO: 218 K/UL (ref 135–450)
PMV BLD AUTO: 8.4 FL (ref 5–10.5)
POTASSIUM SERPL-SCNC: 3.5 MMOL/L (ref 3.5–5.1)
PROTHROMBIN TIME: 24.9 SEC (ref 11.5–14.8)
RBC # BLD AUTO: 3.59 M/UL (ref 4–5.2)
SODIUM SERPL-SCNC: 141 MMOL/L (ref 136–145)
WBC # BLD AUTO: 8 K/UL (ref 4–11)

## 2023-05-26 PROCEDURE — 6370000000 HC RX 637 (ALT 250 FOR IP): Performed by: NURSE PRACTITIONER

## 2023-05-26 PROCEDURE — 85610 PROTHROMBIN TIME: CPT

## 2023-05-26 PROCEDURE — 92526 ORAL FUNCTION THERAPY: CPT

## 2023-05-26 PROCEDURE — 80048 BASIC METABOLIC PNL TOTAL CA: CPT

## 2023-05-26 PROCEDURE — 2580000003 HC RX 258: Performed by: HOSPITALIST

## 2023-05-26 PROCEDURE — 85025 COMPLETE CBC W/AUTO DIFF WBC: CPT

## 2023-05-26 PROCEDURE — 99232 SBSQ HOSP IP/OBS MODERATE 35: CPT | Performed by: NURSE PRACTITIONER

## 2023-05-26 PROCEDURE — 94640 AIRWAY INHALATION TREATMENT: CPT

## 2023-05-26 PROCEDURE — 2580000003 HC RX 258: Performed by: NURSE PRACTITIONER

## 2023-05-26 PROCEDURE — 6370000000 HC RX 637 (ALT 250 FOR IP): Performed by: HOSPITALIST

## 2023-05-26 PROCEDURE — 6360000002 HC RX W HCPCS: Performed by: NURSE PRACTITIONER

## 2023-05-26 PROCEDURE — 6370000000 HC RX 637 (ALT 250 FOR IP): Performed by: FAMILY MEDICINE

## 2023-05-26 PROCEDURE — 1200000000 HC SEMI PRIVATE

## 2023-05-26 PROCEDURE — 36415 COLL VENOUS BLD VENIPUNCTURE: CPT

## 2023-05-26 PROCEDURE — 6370000000 HC RX 637 (ALT 250 FOR IP): Performed by: INTERNAL MEDICINE

## 2023-05-26 PROCEDURE — 83735 ASSAY OF MAGNESIUM: CPT

## 2023-05-26 PROCEDURE — 6360000002 HC RX W HCPCS: Performed by: HOSPITALIST

## 2023-05-26 RX ORDER — SODIUM CHLORIDE 9 MG/ML
25 INJECTION, SOLUTION INTRAVENOUS PRN
Status: DISCONTINUED | OUTPATIENT
Start: 2023-05-26 | End: 2023-06-08 | Stop reason: HOSPADM

## 2023-05-26 RX ORDER — SODIUM CHLORIDE 0.9 % (FLUSH) 0.9 %
5-40 SYRINGE (ML) INJECTION PRN
Status: DISCONTINUED | OUTPATIENT
Start: 2023-05-26 | End: 2023-06-08 | Stop reason: HOSPADM

## 2023-05-26 RX ORDER — DIPHENHYDRAMINE HCL 25 MG
25 TABLET ORAL NIGHTLY PRN
Status: DISPENSED | OUTPATIENT
Start: 2023-05-26 | End: 2023-05-27

## 2023-05-26 RX ORDER — LIDOCAINE HYDROCHLORIDE 10 MG/ML
5 INJECTION, SOLUTION EPIDURAL; INFILTRATION; INTRACAUDAL; PERINEURAL ONCE
Status: DISCONTINUED | OUTPATIENT
Start: 2023-05-26 | End: 2023-06-08 | Stop reason: HOSPADM

## 2023-05-26 RX ORDER — SODIUM CHLORIDE 0.9 % (FLUSH) 0.9 %
5-40 SYRINGE (ML) INJECTION EVERY 12 HOURS SCHEDULED
Status: DISCONTINUED | OUTPATIENT
Start: 2023-05-26 | End: 2023-06-08 | Stop reason: HOSPADM

## 2023-05-26 RX ORDER — INSULIN GLARGINE 100 [IU]/ML
18 INJECTION, SOLUTION SUBCUTANEOUS EVERY MORNING
Status: DISCONTINUED | OUTPATIENT
Start: 2023-05-27 | End: 2023-06-03

## 2023-05-26 RX ORDER — DIPHENHYDRAMINE HCL 25 MG
25 CAPSULE ORAL NIGHTLY PRN
COMMUNITY

## 2023-05-26 RX ORDER — INSULIN LISPRO 100 [IU]/ML
4 INJECTION, SOLUTION INTRAVENOUS; SUBCUTANEOUS
Status: DISCONTINUED | OUTPATIENT
Start: 2023-05-26 | End: 2023-06-03

## 2023-05-26 RX ADMIN — OXYCODONE 5 MG: 5 TABLET ORAL at 00:01

## 2023-05-26 RX ADMIN — Medication 10 ML: at 08:27

## 2023-05-26 RX ADMIN — MIDODRINE HYDROCHLORIDE 2.5 MG: 5 TABLET ORAL at 13:44

## 2023-05-26 RX ADMIN — GUAIFENESIN 1200 MG: 600 TABLET, EXTENDED RELEASE ORAL at 08:22

## 2023-05-26 RX ADMIN — OXYCODONE 5 MG: 5 TABLET ORAL at 20:51

## 2023-05-26 RX ADMIN — MIDODRINE HYDROCHLORIDE 2.5 MG: 5 TABLET ORAL at 18:06

## 2023-05-26 RX ADMIN — DIPHENHYDRAMINE HYDROCHLORIDE 25 MG: 25 TABLET ORAL at 21:53

## 2023-05-26 RX ADMIN — INSULIN LISPRO 3 UNITS: 100 INJECTION, SOLUTION INTRAVENOUS; SUBCUTANEOUS at 08:27

## 2023-05-26 RX ADMIN — ASPIRIN 81 MG: 81 TABLET, CHEWABLE ORAL at 08:22

## 2023-05-26 RX ADMIN — OXYCODONE 5 MG: 5 TABLET ORAL at 08:37

## 2023-05-26 RX ADMIN — IRON SUCROSE 200 MG: 20 INJECTION, SOLUTION INTRAVENOUS at 14:53

## 2023-05-26 RX ADMIN — LEVOTHYROXINE SODIUM 88 MCG: 0.09 TABLET ORAL at 05:49

## 2023-05-26 RX ADMIN — CALCITRIOL CAPSULES 0.25 MCG 0.25 MCG: 0.25 CAPSULE ORAL at 18:06

## 2023-05-26 RX ADMIN — FUROSEMIDE 10 MG/HR: 10 INJECTION, SOLUTION INTRAMUSCULAR; INTRAVENOUS at 13:43

## 2023-05-26 RX ADMIN — ENOXAPARIN SODIUM 40 MG: 100 INJECTION SUBCUTANEOUS at 20:50

## 2023-05-26 RX ADMIN — SPIRONOLACTONE 12.5 MG: 25 TABLET ORAL at 08:24

## 2023-05-26 RX ADMIN — INSULIN GLARGINE 15 UNITS: 100 INJECTION, SOLUTION SUBCUTANEOUS at 08:28

## 2023-05-26 RX ADMIN — INSULIN LISPRO 3 UNITS: 100 INJECTION, SOLUTION INTRAVENOUS; SUBCUTANEOUS at 13:43

## 2023-05-26 RX ADMIN — BENZONATATE 200 MG: 100 CAPSULE ORAL at 20:50

## 2023-05-26 RX ADMIN — DICLOFENAC SODIUM 2 G: 10 GEL TOPICAL at 20:53

## 2023-05-26 RX ADMIN — METOPROLOL SUCCINATE 50 MG: 50 TABLET, EXTENDED RELEASE ORAL at 08:20

## 2023-05-26 RX ADMIN — IPRATROPIUM BROMIDE AND ALBUTEROL SULFATE 1 AMPULE: .5; 3 SOLUTION RESPIRATORY (INHALATION) at 20:53

## 2023-05-26 RX ADMIN — CEFTRIAXONE SODIUM 1000 MG: 1 INJECTION, POWDER, FOR SOLUTION INTRAMUSCULAR; INTRAVENOUS at 18:57

## 2023-05-26 RX ADMIN — AMIODARONE HYDROCHLORIDE 100 MG: 200 TABLET ORAL at 08:20

## 2023-05-26 RX ADMIN — INSULIN LISPRO 4 UNITS: 100 INJECTION, SOLUTION INTRAVENOUS; SUBCUTANEOUS at 18:07

## 2023-05-26 RX ADMIN — MIDODRINE HYDROCHLORIDE 2.5 MG: 5 TABLET ORAL at 08:21

## 2023-05-26 RX ADMIN — IPRATROPIUM BROMIDE AND ALBUTEROL SULFATE 1 AMPULE: .5; 3 SOLUTION RESPIRATORY (INHALATION) at 05:27

## 2023-05-26 RX ADMIN — OXYCODONE 5 MG: 5 TABLET ORAL at 14:46

## 2023-05-26 ASSESSMENT — PAIN SCALES - GENERAL
PAINLEVEL_OUTOF10: 8
PAINLEVEL_OUTOF10: 8
PAINLEVEL_OUTOF10: 5

## 2023-05-26 ASSESSMENT — PAIN DESCRIPTION - DESCRIPTORS: DESCRIPTORS: SHARP;ACHING

## 2023-05-26 ASSESSMENT — PAIN DESCRIPTION - LOCATION
LOCATION: BACK
LOCATION: BACK

## 2023-05-27 PROBLEM — I50.21 ACUTE SYSTOLIC CONGESTIVE HEART FAILURE (HCC): Status: ACTIVE | Noted: 2021-02-26

## 2023-05-27 LAB
BACTERIA SPEC RESP CULT: NORMAL
GLUCOSE BLD-MCNC: 124 MG/DL (ref 70–99)
GLUCOSE BLD-MCNC: 178 MG/DL (ref 70–99)
GLUCOSE BLD-MCNC: 211 MG/DL (ref 70–99)
GLUCOSE BLD-MCNC: 222 MG/DL (ref 70–99)
GRAM STN SPEC: NORMAL
INR PPP: 2.41 (ref 0.84–1.16)
PERFORMED ON: ABNORMAL
PROTHROMBIN TIME: 26.1 SEC (ref 11.5–14.8)

## 2023-05-27 PROCEDURE — 6370000000 HC RX 637 (ALT 250 FOR IP): Performed by: HOSPITALIST

## 2023-05-27 PROCEDURE — 94761 N-INVAS EAR/PLS OXIMETRY MLT: CPT

## 2023-05-27 PROCEDURE — 6370000000 HC RX 637 (ALT 250 FOR IP): Performed by: NURSE PRACTITIONER

## 2023-05-27 PROCEDURE — 85610 PROTHROMBIN TIME: CPT

## 2023-05-27 PROCEDURE — C1751 CATH, INF, PER/CENT/MIDLINE: HCPCS

## 2023-05-27 PROCEDURE — 6370000000 HC RX 637 (ALT 250 FOR IP): Performed by: INTERNAL MEDICINE

## 2023-05-27 PROCEDURE — 2580000003 HC RX 258: Performed by: NURSE PRACTITIONER

## 2023-05-27 PROCEDURE — 94640 AIRWAY INHALATION TREATMENT: CPT

## 2023-05-27 PROCEDURE — 6370000000 HC RX 637 (ALT 250 FOR IP): Performed by: FAMILY MEDICINE

## 2023-05-27 PROCEDURE — 2580000003 HC RX 258: Performed by: FAMILY MEDICINE

## 2023-05-27 PROCEDURE — 36569 INSJ PICC 5 YR+ W/O IMAGING: CPT

## 2023-05-27 PROCEDURE — 6360000002 HC RX W HCPCS: Performed by: NURSE PRACTITIONER

## 2023-05-27 PROCEDURE — 2700000000 HC OXYGEN THERAPY PER DAY

## 2023-05-27 PROCEDURE — 94669 MECHANICAL CHEST WALL OSCILL: CPT

## 2023-05-27 PROCEDURE — 1200000000 HC SEMI PRIVATE

## 2023-05-27 PROCEDURE — 02HV33Z INSERTION OF INFUSION DEVICE INTO SUPERIOR VENA CAVA, PERCUTANEOUS APPROACH: ICD-10-PCS | Performed by: INTERNAL MEDICINE

## 2023-05-27 PROCEDURE — 99232 SBSQ HOSP IP/OBS MODERATE 35: CPT | Performed by: NURSE PRACTITIONER

## 2023-05-27 PROCEDURE — 6360000002 HC RX W HCPCS: Performed by: HOSPITALIST

## 2023-05-27 PROCEDURE — 2580000003 HC RX 258: Performed by: HOSPITALIST

## 2023-05-27 PROCEDURE — 36415 COLL VENOUS BLD VENIPUNCTURE: CPT

## 2023-05-27 RX ORDER — SENNA AND DOCUSATE SODIUM 50; 8.6 MG/1; MG/1
2 TABLET, FILM COATED ORAL DAILY
Status: DISCONTINUED | OUTPATIENT
Start: 2023-05-27 | End: 2023-06-08 | Stop reason: HOSPADM

## 2023-05-27 RX ORDER — POLYETHYLENE GLYCOL 3350 17 G/17G
17 POWDER, FOR SOLUTION ORAL DAILY
Status: DISCONTINUED | OUTPATIENT
Start: 2023-05-27 | End: 2023-06-08 | Stop reason: HOSPADM

## 2023-05-27 RX ORDER — METOCLOPRAMIDE 10 MG/1
5 TABLET ORAL 2 TIMES DAILY WITH MEALS
Status: DISCONTINUED | OUTPATIENT
Start: 2023-05-27 | End: 2023-06-08 | Stop reason: HOSPADM

## 2023-05-27 RX ORDER — SPIRONOLACTONE 25 MG/1
25 TABLET ORAL DAILY
Status: DISCONTINUED | OUTPATIENT
Start: 2023-05-28 | End: 2023-05-28

## 2023-05-27 RX ORDER — DIPHENHYDRAMINE HCL 25 MG
25 TABLET ORAL ONCE
Status: COMPLETED | OUTPATIENT
Start: 2023-05-27 | End: 2023-05-27

## 2023-05-27 RX ADMIN — CEFTRIAXONE SODIUM 1000 MG: 1 INJECTION, POWDER, FOR SOLUTION INTRAMUSCULAR; INTRAVENOUS at 18:03

## 2023-05-27 RX ADMIN — INSULIN LISPRO 2 UNITS: 100 INJECTION, SOLUTION INTRAVENOUS; SUBCUTANEOUS at 17:52

## 2023-05-27 RX ADMIN — STANDARDIZED SENNA CONCENTRATE AND DOCUSATE SODIUM 2 TABLET: 8.6; 5 TABLET ORAL at 13:07

## 2023-05-27 RX ADMIN — SPIRONOLACTONE 12.5 MG: 25 TABLET ORAL at 08:59

## 2023-05-27 RX ADMIN — Medication 10 ML: at 09:02

## 2023-05-27 RX ADMIN — BENZONATATE 200 MG: 100 CAPSULE ORAL at 20:05

## 2023-05-27 RX ADMIN — INSULIN GLARGINE 18 UNITS: 100 INJECTION, SOLUTION SUBCUTANEOUS at 09:04

## 2023-05-27 RX ADMIN — FUROSEMIDE 10 MG/HR: 10 INJECTION, SOLUTION INTRAMUSCULAR; INTRAVENOUS at 15:26

## 2023-05-27 RX ADMIN — AMIODARONE HYDROCHLORIDE 100 MG: 200 TABLET ORAL at 09:00

## 2023-05-27 RX ADMIN — OXYCODONE 5 MG: 5 TABLET ORAL at 21:59

## 2023-05-27 RX ADMIN — DIPHENHYDRAMINE HYDROCHLORIDE 25 MG: 25 TABLET ORAL at 22:24

## 2023-05-27 RX ADMIN — ASPIRIN 81 MG: 81 TABLET, CHEWABLE ORAL at 08:59

## 2023-05-27 RX ADMIN — INSULIN LISPRO 4 UNITS: 100 INJECTION, SOLUTION INTRAVENOUS; SUBCUTANEOUS at 17:53

## 2023-05-27 RX ADMIN — MIDODRINE HYDROCHLORIDE 2.5 MG: 5 TABLET ORAL at 13:00

## 2023-05-27 RX ADMIN — OXYCODONE 5 MG: 5 TABLET ORAL at 04:57

## 2023-05-27 RX ADMIN — Medication 10 ML: at 08:58

## 2023-05-27 RX ADMIN — MIDODRINE HYDROCHLORIDE 2.5 MG: 5 TABLET ORAL at 08:59

## 2023-05-27 RX ADMIN — METOCLOPRAMIDE HYDROCHLORIDE 5 MG: 10 TABLET ORAL at 17:46

## 2023-05-27 RX ADMIN — METOPROLOL SUCCINATE 50 MG: 50 TABLET, EXTENDED RELEASE ORAL at 08:59

## 2023-05-27 RX ADMIN — CALCITRIOL CAPSULES 0.25 MCG 0.25 MCG: 0.25 CAPSULE ORAL at 17:46

## 2023-05-27 RX ADMIN — GUAIFENESIN 1200 MG: 600 TABLET, EXTENDED RELEASE ORAL at 20:03

## 2023-05-27 RX ADMIN — IPRATROPIUM BROMIDE AND ALBUTEROL SULFATE 1 AMPULE: .5; 3 SOLUTION RESPIRATORY (INHALATION) at 19:15

## 2023-05-27 RX ADMIN — Medication 10 ML: at 20:05

## 2023-05-27 RX ADMIN — MIDODRINE HYDROCHLORIDE 2.5 MG: 5 TABLET ORAL at 17:45

## 2023-05-27 RX ADMIN — METOCLOPRAMIDE HYDROCHLORIDE 5 MG: 10 TABLET ORAL at 13:07

## 2023-05-27 RX ADMIN — BENZOCAINE AND MENTHOL 1 LOZENGE: 15; 3.6 LOZENGE ORAL at 15:20

## 2023-05-27 RX ADMIN — IPRATROPIUM BROMIDE AND ALBUTEROL SULFATE 1 AMPULE: .5; 3 SOLUTION RESPIRATORY (INHALATION) at 14:29

## 2023-05-27 RX ADMIN — ENOXAPARIN SODIUM 40 MG: 100 INJECTION SUBCUTANEOUS at 20:05

## 2023-05-27 RX ADMIN — LEVOTHYROXINE SODIUM 88 MCG: 0.09 TABLET ORAL at 05:49

## 2023-05-27 RX ADMIN — POLYETHYLENE GLYCOL 3350 17 G: 17 POWDER, FOR SOLUTION ORAL at 13:08

## 2023-05-27 RX ADMIN — ONDANSETRON 4 MG: 4 TABLET, ORALLY DISINTEGRATING ORAL at 19:10

## 2023-05-27 ASSESSMENT — PAIN DESCRIPTION - LOCATION
LOCATION: BACK

## 2023-05-27 ASSESSMENT — PAIN SCALES - GENERAL
PAINLEVEL_OUTOF10: 8
PAINLEVEL_OUTOF10: 8

## 2023-05-28 ENCOUNTER — APPOINTMENT (OUTPATIENT)
Dept: GENERAL RADIOLOGY | Age: 78
DRG: 177 | End: 2023-05-28
Payer: MEDICARE

## 2023-05-28 LAB
ALBUMIN SERPL-MCNC: 2.9 G/DL (ref 3.4–5)
ANION GAP SERPL CALCULATED.3IONS-SCNC: 7 MMOL/L (ref 3–16)
BUN SERPL-MCNC: 31 MG/DL (ref 7–20)
CALCIUM SERPL-MCNC: 8.9 MG/DL (ref 8.3–10.6)
CHLORIDE SERPL-SCNC: 97 MMOL/L (ref 99–110)
CO2 SERPL-SCNC: 36 MMOL/L (ref 21–32)
CREAT SERPL-MCNC: 1.7 MG/DL (ref 0.6–1.2)
GFR SERPLBLD CREATININE-BSD FMLA CKD-EPI: 31 ML/MIN/{1.73_M2}
GLUCOSE BLD-MCNC: 106 MG/DL (ref 70–99)
GLUCOSE BLD-MCNC: 162 MG/DL (ref 70–99)
GLUCOSE BLD-MCNC: 195 MG/DL (ref 70–99)
GLUCOSE BLD-MCNC: 201 MG/DL (ref 70–99)
GLUCOSE SERPL-MCNC: 92 MG/DL (ref 70–99)
INR PPP: 2.07 (ref 0.84–1.16)
MAGNESIUM SERPL-MCNC: 2.1 MG/DL (ref 1.8–2.4)
PERFORMED ON: ABNORMAL
PHOSPHATE SERPL-MCNC: 3.7 MG/DL (ref 2.5–4.9)
POTASSIUM SERPL-SCNC: 2.8 MMOL/L (ref 3.5–5.1)
POTASSIUM SERPL-SCNC: 3.9 MMOL/L (ref 3.5–5.1)
PROCALCITONIN SERPL IA-MCNC: 0.22 NG/ML (ref 0–0.15)
PROTHROMBIN TIME: 23.2 SEC (ref 11.5–14.8)
SODIUM SERPL-SCNC: 140 MMOL/L (ref 136–145)

## 2023-05-28 PROCEDURE — 94761 N-INVAS EAR/PLS OXIMETRY MLT: CPT

## 2023-05-28 PROCEDURE — 85610 PROTHROMBIN TIME: CPT

## 2023-05-28 PROCEDURE — 6370000000 HC RX 637 (ALT 250 FOR IP): Performed by: FAMILY MEDICINE

## 2023-05-28 PROCEDURE — 2580000003 HC RX 258: Performed by: HOSPITALIST

## 2023-05-28 PROCEDURE — 71045 X-RAY EXAM CHEST 1 VIEW: CPT

## 2023-05-28 PROCEDURE — 6360000002 HC RX W HCPCS: Performed by: NURSE PRACTITIONER

## 2023-05-28 PROCEDURE — 6370000000 HC RX 637 (ALT 250 FOR IP): Performed by: NURSE PRACTITIONER

## 2023-05-28 PROCEDURE — 6360000002 HC RX W HCPCS: Performed by: INTERNAL MEDICINE

## 2023-05-28 PROCEDURE — 36415 COLL VENOUS BLD VENIPUNCTURE: CPT

## 2023-05-28 PROCEDURE — 84132 ASSAY OF SERUM POTASSIUM: CPT

## 2023-05-28 PROCEDURE — 99232 SBSQ HOSP IP/OBS MODERATE 35: CPT | Performed by: NURSE PRACTITIONER

## 2023-05-28 PROCEDURE — 80069 RENAL FUNCTION PANEL: CPT

## 2023-05-28 PROCEDURE — 1200000000 HC SEMI PRIVATE

## 2023-05-28 PROCEDURE — 2700000000 HC OXYGEN THERAPY PER DAY

## 2023-05-28 PROCEDURE — 83735 ASSAY OF MAGNESIUM: CPT

## 2023-05-28 PROCEDURE — 6370000000 HC RX 637 (ALT 250 FOR IP): Performed by: INTERNAL MEDICINE

## 2023-05-28 PROCEDURE — 94669 MECHANICAL CHEST WALL OSCILL: CPT

## 2023-05-28 PROCEDURE — 84145 PROCALCITONIN (PCT): CPT

## 2023-05-28 PROCEDURE — 2580000003 HC RX 258: Performed by: NURSE PRACTITIONER

## 2023-05-28 PROCEDURE — 6370000000 HC RX 637 (ALT 250 FOR IP): Performed by: HOSPITALIST

## 2023-05-28 PROCEDURE — 94640 AIRWAY INHALATION TREATMENT: CPT

## 2023-05-28 PROCEDURE — 2580000003 HC RX 258: Performed by: FAMILY MEDICINE

## 2023-05-28 RX ORDER — ACETAZOLAMIDE 500 MG/5ML
500 INJECTION, POWDER, LYOPHILIZED, FOR SOLUTION INTRAVENOUS ONCE
Status: COMPLETED | OUTPATIENT
Start: 2023-05-28 | End: 2023-05-28

## 2023-05-28 RX ORDER — SPIRONOLACTONE 25 MG/1
25 TABLET ORAL 2 TIMES DAILY
Status: DISCONTINUED | OUTPATIENT
Start: 2023-05-28 | End: 2023-06-07

## 2023-05-28 RX ORDER — POTASSIUM CHLORIDE 20 MEQ/1
40 TABLET, EXTENDED RELEASE ORAL PRN
Status: DISCONTINUED | OUTPATIENT
Start: 2023-05-28 | End: 2023-06-08 | Stop reason: HOSPADM

## 2023-05-28 RX ORDER — GUAIFENESIN/DEXTROMETHORPHAN 100-10MG/5
5 SYRUP ORAL EVERY 4 HOURS PRN
Status: DISCONTINUED | OUTPATIENT
Start: 2023-05-28 | End: 2023-06-02

## 2023-05-28 RX ORDER — LANOLIN ALCOHOL/MO/W.PET/CERES
3 CREAM (GRAM) TOPICAL NIGHTLY PRN
Status: DISCONTINUED | OUTPATIENT
Start: 2023-05-28 | End: 2023-06-08 | Stop reason: HOSPADM

## 2023-05-28 RX ORDER — MAGNESIUM SULFATE IN WATER 40 MG/ML
2000 INJECTION, SOLUTION INTRAVENOUS PRN
Status: DISCONTINUED | OUTPATIENT
Start: 2023-05-28 | End: 2023-06-08 | Stop reason: HOSPADM

## 2023-05-28 RX ORDER — BENZONATATE 100 MG/1
200 CAPSULE ORAL EVERY 8 HOURS PRN
Status: DISCONTINUED | OUTPATIENT
Start: 2023-05-28 | End: 2023-06-08 | Stop reason: HOSPADM

## 2023-05-28 RX ORDER — POTASSIUM CHLORIDE 7.45 MG/ML
10 INJECTION INTRAVENOUS PRN
Status: DISCONTINUED | OUTPATIENT
Start: 2023-05-28 | End: 2023-06-08 | Stop reason: HOSPADM

## 2023-05-28 RX ORDER — TRAZODONE HYDROCHLORIDE 50 MG/1
50 TABLET ORAL NIGHTLY PRN
Status: DISCONTINUED | OUTPATIENT
Start: 2023-05-28 | End: 2023-05-30

## 2023-05-28 RX ADMIN — Medication 10 ML: at 19:53

## 2023-05-28 RX ADMIN — POTASSIUM BICARBONATE 50 MEQ: 978 TABLET, EFFERVESCENT ORAL at 09:26

## 2023-05-28 RX ADMIN — ACETAMINOPHEN 650 MG: 325 TABLET ORAL at 23:27

## 2023-05-28 RX ADMIN — BENZONATATE 200 MG: 100 CAPSULE ORAL at 08:21

## 2023-05-28 RX ADMIN — Medication 10 ML: at 08:25

## 2023-05-28 RX ADMIN — FUROSEMIDE 10 MG/HR: 10 INJECTION, SOLUTION INTRAMUSCULAR; INTRAVENOUS at 02:07

## 2023-05-28 RX ADMIN — IPRATROPIUM BROMIDE AND ALBUTEROL SULFATE 1 AMPULE: .5; 3 SOLUTION RESPIRATORY (INHALATION) at 08:45

## 2023-05-28 RX ADMIN — GUAIFENESIN 1200 MG: 600 TABLET, EXTENDED RELEASE ORAL at 19:54

## 2023-05-28 RX ADMIN — MELATONIN TAB 3 MG 3 MG: 3 TAB at 21:25

## 2023-05-28 RX ADMIN — SPIRONOLACTONE 25 MG: 25 TABLET ORAL at 08:21

## 2023-05-28 RX ADMIN — CALCITRIOL CAPSULES 0.25 MCG 0.25 MCG: 0.25 CAPSULE ORAL at 16:27

## 2023-05-28 RX ADMIN — OXYCODONE 5 MG: 5 TABLET ORAL at 05:57

## 2023-05-28 RX ADMIN — MIDODRINE HYDROCHLORIDE 2.5 MG: 5 TABLET ORAL at 08:22

## 2023-05-28 RX ADMIN — SPIRONOLACTONE 25 MG: 25 TABLET ORAL at 17:05

## 2023-05-28 RX ADMIN — BENZONATATE 200 MG: 100 CAPSULE ORAL at 19:54

## 2023-05-28 RX ADMIN — AMIODARONE HYDROCHLORIDE 100 MG: 200 TABLET ORAL at 08:22

## 2023-05-28 RX ADMIN — METOPROLOL SUCCINATE 50 MG: 50 TABLET, EXTENDED RELEASE ORAL at 08:22

## 2023-05-28 RX ADMIN — LEVOTHYROXINE SODIUM 88 MCG: 0.09 TABLET ORAL at 05:57

## 2023-05-28 RX ADMIN — OXYCODONE 5 MG: 5 TABLET ORAL at 19:54

## 2023-05-28 RX ADMIN — METOCLOPRAMIDE HYDROCHLORIDE 5 MG: 10 TABLET ORAL at 08:21

## 2023-05-28 RX ADMIN — IPRATROPIUM BROMIDE AND ALBUTEROL SULFATE 1 AMPULE: .5; 3 SOLUTION RESPIRATORY (INHALATION) at 16:45

## 2023-05-28 RX ADMIN — MIDODRINE HYDROCHLORIDE 2.5 MG: 5 TABLET ORAL at 10:45

## 2023-05-28 RX ADMIN — BENZONATATE 200 MG: 100 CAPSULE ORAL at 05:57

## 2023-05-28 RX ADMIN — FUROSEMIDE 10 MG/HR: 10 INJECTION, SOLUTION INTRAMUSCULAR; INTRAVENOUS at 21:44

## 2023-05-28 RX ADMIN — INSULIN LISPRO 4 UNITS: 100 INJECTION, SOLUTION INTRAVENOUS; SUBCUTANEOUS at 17:05

## 2023-05-28 RX ADMIN — INSULIN LISPRO 2 UNITS: 100 INJECTION, SOLUTION INTRAVENOUS; SUBCUTANEOUS at 17:06

## 2023-05-28 RX ADMIN — POTASSIUM BICARBONATE 50 MEQ: 978 TABLET, EFFERVESCENT ORAL at 13:06

## 2023-05-28 RX ADMIN — MIDODRINE HYDROCHLORIDE 2.5 MG: 5 TABLET ORAL at 16:28

## 2023-05-28 RX ADMIN — GUAIFENESIN AND DEXTROMETHORPHAN 5 ML: 100; 10 SYRUP ORAL at 16:28

## 2023-05-28 RX ADMIN — METOCLOPRAMIDE HYDROCHLORIDE 5 MG: 10 TABLET ORAL at 16:28

## 2023-05-28 RX ADMIN — IRON SUCROSE 200 MG: 20 INJECTION, SOLUTION INTRAVENOUS at 10:46

## 2023-05-28 RX ADMIN — GUAIFENESIN 1200 MG: 600 TABLET, EXTENDED RELEASE ORAL at 08:22

## 2023-05-28 RX ADMIN — GUAIFENESIN AND DEXTROMETHORPHAN 5 ML: 100; 10 SYRUP ORAL at 21:24

## 2023-05-28 RX ADMIN — ASPIRIN 81 MG: 81 TABLET, CHEWABLE ORAL at 08:22

## 2023-05-28 RX ADMIN — INSULIN LISPRO 4 UNITS: 100 INJECTION, SOLUTION INTRAVENOUS; SUBCUTANEOUS at 13:06

## 2023-05-28 RX ADMIN — TRAZODONE HYDROCHLORIDE 50 MG: 50 TABLET ORAL at 21:25

## 2023-05-28 RX ADMIN — IPRATROPIUM BROMIDE AND ALBUTEROL SULFATE 1 AMPULE: .5; 3 SOLUTION RESPIRATORY (INHALATION) at 20:30

## 2023-05-28 RX ADMIN — GUAIFENESIN AND DEXTROMETHORPHAN 5 ML: 100; 10 SYRUP ORAL at 10:45

## 2023-05-28 RX ADMIN — INSULIN GLARGINE 18 UNITS: 100 INJECTION, SOLUTION SUBCUTANEOUS at 08:23

## 2023-05-28 RX ADMIN — ACETAZOLAMIDE 500 MG: 500 INJECTION, POWDER, LYOPHILIZED, FOR SOLUTION INTRAVENOUS at 17:05

## 2023-05-28 ASSESSMENT — PAIN DESCRIPTION - LOCATION
LOCATION: BACK
LOCATION: BACK
LOCATION: HEAD

## 2023-05-28 ASSESSMENT — PAIN SCALES - GENERAL
PAINLEVEL_OUTOF10: 0
PAINLEVEL_OUTOF10: 7
PAINLEVEL_OUTOF10: 8
PAINLEVEL_OUTOF10: 7

## 2023-05-29 LAB
ALBUMIN SERPL-MCNC: 2.7 G/DL (ref 3.4–5)
ANION GAP SERPL CALCULATED.3IONS-SCNC: 8 MMOL/L (ref 3–16)
ANTI-XA UNFRAC HEPARIN: 0.25 IU/ML (ref 0.3–0.7)
ANTI-XA UNFRAC HEPARIN: 0.28 IU/ML (ref 0.3–0.7)
ANTI-XA UNFRAC HEPARIN: <0.1 IU/ML (ref 0.3–0.7)
APTT BLD: 32.7 SEC (ref 22.7–35.9)
BASOPHILS # BLD: 0 K/UL (ref 0–0.2)
BASOPHILS NFR BLD: 0.3 %
BUN SERPL-MCNC: 38 MG/DL (ref 7–20)
CALCIUM SERPL-MCNC: 9.1 MG/DL (ref 8.3–10.6)
CHLORIDE SERPL-SCNC: 95 MMOL/L (ref 99–110)
CO2 SERPL-SCNC: 35 MMOL/L (ref 21–32)
CREAT SERPL-MCNC: 2 MG/DL (ref 0.6–1.2)
DEPRECATED RDW RBC AUTO: 16.8 % (ref 12.4–15.4)
EOSINOPHIL # BLD: 0.1 K/UL (ref 0–0.6)
EOSINOPHIL NFR BLD: 2.1 %
GFR SERPLBLD CREATININE-BSD FMLA CKD-EPI: 25 ML/MIN/{1.73_M2}
GLUCOSE BLD-MCNC: 135 MG/DL (ref 70–99)
GLUCOSE BLD-MCNC: 183 MG/DL (ref 70–99)
GLUCOSE BLD-MCNC: 199 MG/DL (ref 70–99)
GLUCOSE BLD-MCNC: 88 MG/DL (ref 70–99)
GLUCOSE SERPL-MCNC: 85 MG/DL (ref 70–99)
HCT VFR BLD AUTO: 29.8 % (ref 36–48)
HGB BLD-MCNC: 9.3 G/DL (ref 12–16)
INR PPP: 1.89 (ref 0.84–1.16)
LYMPHOCYTES # BLD: 0.7 K/UL (ref 1–5.1)
LYMPHOCYTES NFR BLD: 9.8 %
MAGNESIUM SERPL-MCNC: 2.1 MG/DL (ref 1.8–2.4)
MCH RBC QN AUTO: 28.4 PG (ref 26–34)
MCHC RBC AUTO-ENTMCNC: 31.1 G/DL (ref 31–36)
MCV RBC AUTO: 91.3 FL (ref 80–100)
MONOCYTES # BLD: 0.7 K/UL (ref 0–1.3)
MONOCYTES NFR BLD: 10.3 %
NEUTROPHILS # BLD: 5.2 K/UL (ref 1.7–7.7)
NEUTROPHILS NFR BLD: 77.5 %
PERFORMED ON: ABNORMAL
PERFORMED ON: NORMAL
PHOSPHATE SERPL-MCNC: 3.5 MG/DL (ref 2.5–4.9)
PLATELET # BLD AUTO: 192 K/UL (ref 135–450)
PMV BLD AUTO: 9 FL (ref 5–10.5)
POTASSIUM SERPL-SCNC: 3.1 MMOL/L (ref 3.5–5.1)
PROTHROMBIN TIME: 21.6 SEC (ref 11.5–14.8)
RBC # BLD AUTO: 3.27 M/UL (ref 4–5.2)
SODIUM SERPL-SCNC: 138 MMOL/L (ref 136–145)
WBC # BLD AUTO: 6.7 K/UL (ref 4–11)

## 2023-05-29 PROCEDURE — 85730 THROMBOPLASTIN TIME PARTIAL: CPT

## 2023-05-29 PROCEDURE — 6370000000 HC RX 637 (ALT 250 FOR IP): Performed by: HOSPITALIST

## 2023-05-29 PROCEDURE — 85610 PROTHROMBIN TIME: CPT

## 2023-05-29 PROCEDURE — 94761 N-INVAS EAR/PLS OXIMETRY MLT: CPT

## 2023-05-29 PROCEDURE — 85520 HEPARIN ASSAY: CPT

## 2023-05-29 PROCEDURE — 6370000000 HC RX 637 (ALT 250 FOR IP): Performed by: INTERNAL MEDICINE

## 2023-05-29 PROCEDURE — 2580000003 HC RX 258: Performed by: NURSE PRACTITIONER

## 2023-05-29 PROCEDURE — 6370000000 HC RX 637 (ALT 250 FOR IP): Performed by: NURSE PRACTITIONER

## 2023-05-29 PROCEDURE — 2580000003 HC RX 258: Performed by: HOSPITALIST

## 2023-05-29 PROCEDURE — 2580000003 HC RX 258: Performed by: FAMILY MEDICINE

## 2023-05-29 PROCEDURE — 1200000000 HC SEMI PRIVATE

## 2023-05-29 PROCEDURE — 94640 AIRWAY INHALATION TREATMENT: CPT

## 2023-05-29 PROCEDURE — 36415 COLL VENOUS BLD VENIPUNCTURE: CPT

## 2023-05-29 PROCEDURE — 6360000002 HC RX W HCPCS: Performed by: NURSE PRACTITIONER

## 2023-05-29 PROCEDURE — 80069 RENAL FUNCTION PANEL: CPT

## 2023-05-29 PROCEDURE — 2700000000 HC OXYGEN THERAPY PER DAY

## 2023-05-29 PROCEDURE — 83735 ASSAY OF MAGNESIUM: CPT

## 2023-05-29 PROCEDURE — 85025 COMPLETE CBC W/AUTO DIFF WBC: CPT

## 2023-05-29 PROCEDURE — 6360000002 HC RX W HCPCS: Performed by: HOSPITALIST

## 2023-05-29 PROCEDURE — 94669 MECHANICAL CHEST WALL OSCILL: CPT

## 2023-05-29 RX ORDER — HEPARIN SODIUM 1000 [USP'U]/ML
4000 INJECTION, SOLUTION INTRAVENOUS; SUBCUTANEOUS ONCE
Status: COMPLETED | OUTPATIENT
Start: 2023-05-29 | End: 2023-05-29

## 2023-05-29 RX ORDER — HEPARIN SODIUM 1000 [USP'U]/ML
4000 INJECTION, SOLUTION INTRAVENOUS; SUBCUTANEOUS PRN
Status: DISCONTINUED | OUTPATIENT
Start: 2023-05-29 | End: 2023-06-01

## 2023-05-29 RX ORDER — HEPARIN SODIUM 1000 [USP'U]/ML
2000 INJECTION, SOLUTION INTRAVENOUS; SUBCUTANEOUS PRN
Status: DISCONTINUED | OUTPATIENT
Start: 2023-05-29 | End: 2023-06-01

## 2023-05-29 RX ORDER — HEPARIN SODIUM 10000 [USP'U]/100ML
5-30 INJECTION, SOLUTION INTRAVENOUS CONTINUOUS
Status: DISCONTINUED | OUTPATIENT
Start: 2023-05-29 | End: 2023-06-01

## 2023-05-29 RX ADMIN — GUAIFENESIN AND DEXTROMETHORPHAN 5 ML: 100; 10 SYRUP ORAL at 18:11

## 2023-05-29 RX ADMIN — AMIODARONE HYDROCHLORIDE 100 MG: 200 TABLET ORAL at 08:43

## 2023-05-29 RX ADMIN — GUAIFENESIN 1200 MG: 600 TABLET, EXTENDED RELEASE ORAL at 08:43

## 2023-05-29 RX ADMIN — SPIRONOLACTONE 25 MG: 25 TABLET ORAL at 18:11

## 2023-05-29 RX ADMIN — Medication 10 ML: at 08:49

## 2023-05-29 RX ADMIN — HEPARIN SODIUM 4000 UNITS: 1000 INJECTION INTRAVENOUS; SUBCUTANEOUS at 09:57

## 2023-05-29 RX ADMIN — SPIRONOLACTONE 25 MG: 25 TABLET ORAL at 08:43

## 2023-05-29 RX ADMIN — METOCLOPRAMIDE HYDROCHLORIDE 5 MG: 10 TABLET ORAL at 08:44

## 2023-05-29 RX ADMIN — METOCLOPRAMIDE HYDROCHLORIDE 5 MG: 10 TABLET ORAL at 18:11

## 2023-05-29 RX ADMIN — POLYETHYLENE GLYCOL 3350 17 G: 17 POWDER, FOR SOLUTION ORAL at 08:42

## 2023-05-29 RX ADMIN — STANDARDIZED SENNA CONCENTRATE AND DOCUSATE SODIUM 2 TABLET: 8.6; 5 TABLET ORAL at 08:43

## 2023-05-29 RX ADMIN — IPRATROPIUM BROMIDE AND ALBUTEROL SULFATE 1 AMPULE: .5; 3 SOLUTION RESPIRATORY (INHALATION) at 22:14

## 2023-05-29 RX ADMIN — Medication 10 ML: at 21:50

## 2023-05-29 RX ADMIN — ASPIRIN 81 MG: 81 TABLET, CHEWABLE ORAL at 08:43

## 2023-05-29 RX ADMIN — OXYCODONE 5 MG: 5 TABLET ORAL at 21:29

## 2023-05-29 RX ADMIN — DICLOFENAC SODIUM 2 G: 10 GEL TOPICAL at 12:14

## 2023-05-29 RX ADMIN — HEPARIN SODIUM 2000 UNITS: 1000 INJECTION INTRAVENOUS; SUBCUTANEOUS at 15:54

## 2023-05-29 RX ADMIN — IRON SUCROSE 200 MG: 20 INJECTION, SOLUTION INTRAVENOUS at 10:04

## 2023-05-29 RX ADMIN — CALCITRIOL CAPSULES 0.25 MCG 0.25 MCG: 0.25 CAPSULE ORAL at 18:11

## 2023-05-29 RX ADMIN — INSULIN LISPRO 4 UNITS: 100 INJECTION, SOLUTION INTRAVENOUS; SUBCUTANEOUS at 08:49

## 2023-05-29 RX ADMIN — MIDODRINE HYDROCHLORIDE 2.5 MG: 5 TABLET ORAL at 08:43

## 2023-05-29 RX ADMIN — Medication 10 ML: at 08:48

## 2023-05-29 RX ADMIN — INSULIN GLARGINE 18 UNITS: 100 INJECTION, SOLUTION SUBCUTANEOUS at 08:49

## 2023-05-29 RX ADMIN — BENZONATATE 200 MG: 100 CAPSULE ORAL at 21:29

## 2023-05-29 RX ADMIN — GUAIFENESIN AND DEXTROMETHORPHAN 5 ML: 100; 10 SYRUP ORAL at 23:55

## 2023-05-29 RX ADMIN — IPRATROPIUM BROMIDE AND ALBUTEROL SULFATE 1 AMPULE: .5; 3 SOLUTION RESPIRATORY (INHALATION) at 08:55

## 2023-05-29 RX ADMIN — OXYCODONE 5 MG: 5 TABLET ORAL at 01:53

## 2023-05-29 RX ADMIN — POTASSIUM BICARBONATE 50 MEQ: 978 TABLET, EFFERVESCENT ORAL at 14:52

## 2023-05-29 RX ADMIN — METOPROLOL SUCCINATE 50 MG: 50 TABLET, EXTENDED RELEASE ORAL at 08:43

## 2023-05-29 RX ADMIN — HEPARIN SODIUM 11 UNITS/KG/HR: 10000 INJECTION, SOLUTION INTRAVENOUS at 10:29

## 2023-05-29 RX ADMIN — MIDODRINE HYDROCHLORIDE 2.5 MG: 5 TABLET ORAL at 18:11

## 2023-05-29 RX ADMIN — HEPARIN SODIUM 2000 UNITS: 1000 INJECTION INTRAVENOUS; SUBCUTANEOUS at 22:54

## 2023-05-29 RX ADMIN — ONDANSETRON 4 MG: 2 INJECTION INTRAMUSCULAR; INTRAVENOUS at 16:29

## 2023-05-29 RX ADMIN — FUROSEMIDE 10 MG/HR: 10 INJECTION, SOLUTION INTRAMUSCULAR; INTRAVENOUS at 21:39

## 2023-05-29 RX ADMIN — MIDODRINE HYDROCHLORIDE 2.5 MG: 5 TABLET ORAL at 12:13

## 2023-05-29 RX ADMIN — FUROSEMIDE 10 MG/HR: 10 INJECTION, SOLUTION INTRAMUSCULAR; INTRAVENOUS at 10:31

## 2023-05-29 RX ADMIN — LEVOTHYROXINE SODIUM 88 MCG: 0.09 TABLET ORAL at 05:59

## 2023-05-29 ASSESSMENT — PAIN SCALES - GENERAL
PAINLEVEL_OUTOF10: 8
PAINLEVEL_OUTOF10: 7
PAINLEVEL_OUTOF10: 0
PAINLEVEL_OUTOF10: 0
PAINLEVEL_OUTOF10: 7

## 2023-05-29 ASSESSMENT — PAIN DESCRIPTION - ORIENTATION: ORIENTATION: MID

## 2023-05-29 ASSESSMENT — PAIN DESCRIPTION - PAIN TYPE: TYPE: CHRONIC PAIN

## 2023-05-29 ASSESSMENT — PAIN DESCRIPTION - LOCATION
LOCATION: BACK
LOCATION: BACK

## 2023-05-29 ASSESSMENT — PAIN DESCRIPTION - DESCRIPTORS: DESCRIPTORS: SHARP

## 2023-05-30 LAB
ALBUMIN SERPL-MCNC: 2.5 G/DL (ref 3.4–5)
ANION GAP SERPL CALCULATED.3IONS-SCNC: 5 MMOL/L (ref 3–16)
ANION GAP SERPL CALCULATED.3IONS-SCNC: 6 MMOL/L (ref 3–16)
ANTI-XA UNFRAC HEPARIN: 0.39 IU/ML (ref 0.3–0.7)
ANTI-XA UNFRAC HEPARIN: 0.44 IU/ML (ref 0.3–0.7)
BASOPHILS # BLD: 0 K/UL (ref 0–0.2)
BASOPHILS NFR BLD: 0.3 %
BUN SERPL-MCNC: 38 MG/DL (ref 7–20)
BUN SERPL-MCNC: 39 MG/DL (ref 7–20)
CALCIUM SERPL-MCNC: 9 MG/DL (ref 8.3–10.6)
CALCIUM SERPL-MCNC: 9.1 MG/DL (ref 8.3–10.6)
CHLORIDE SERPL-SCNC: 95 MMOL/L (ref 99–110)
CHLORIDE SERPL-SCNC: 95 MMOL/L (ref 99–110)
CO2 SERPL-SCNC: 34 MMOL/L (ref 21–32)
CO2 SERPL-SCNC: 34 MMOL/L (ref 21–32)
CREAT SERPL-MCNC: 1.8 MG/DL (ref 0.6–1.2)
CREAT SERPL-MCNC: 1.9 MG/DL (ref 0.6–1.2)
DEPRECATED RDW RBC AUTO: 16.4 % (ref 12.4–15.4)
DEPRECATED RDW RBC AUTO: 16.8 % (ref 12.4–15.4)
EOSINOPHIL # BLD: 0.1 K/UL (ref 0–0.6)
EOSINOPHIL NFR BLD: 1.5 %
GFR SERPLBLD CREATININE-BSD FMLA CKD-EPI: 27 ML/MIN/{1.73_M2}
GFR SERPLBLD CREATININE-BSD FMLA CKD-EPI: 29 ML/MIN/{1.73_M2}
GLUCOSE BLD-MCNC: 122 MG/DL (ref 70–99)
GLUCOSE BLD-MCNC: 126 MG/DL (ref 70–99)
GLUCOSE BLD-MCNC: 152 MG/DL (ref 70–99)
GLUCOSE BLD-MCNC: 200 MG/DL (ref 70–99)
GLUCOSE SERPL-MCNC: 117 MG/DL (ref 70–99)
GLUCOSE SERPL-MCNC: 127 MG/DL (ref 70–99)
HCT VFR BLD AUTO: 28.3 % (ref 36–48)
HCT VFR BLD AUTO: 29.8 % (ref 36–48)
HGB BLD-MCNC: 9 G/DL (ref 12–16)
HGB BLD-MCNC: 9.4 G/DL (ref 12–16)
INR PPP: 1.6 (ref 0.84–1.16)
LYMPHOCYTES # BLD: 0.6 K/UL (ref 1–5.1)
LYMPHOCYTES NFR BLD: 7 %
MCH RBC QN AUTO: 28.8 PG (ref 26–34)
MCH RBC QN AUTO: 28.9 PG (ref 26–34)
MCHC RBC AUTO-ENTMCNC: 31.4 G/DL (ref 31–36)
MCHC RBC AUTO-ENTMCNC: 31.7 G/DL (ref 31–36)
MCV RBC AUTO: 91 FL (ref 80–100)
MCV RBC AUTO: 91.7 FL (ref 80–100)
MONOCYTES # BLD: 0.7 K/UL (ref 0–1.3)
MONOCYTES NFR BLD: 8.4 %
NEUTROPHILS # BLD: 7.3 K/UL (ref 1.7–7.7)
NEUTROPHILS NFR BLD: 82.8 %
PERFORMED ON: ABNORMAL
PHOSPHATE SERPL-MCNC: 3.3 MG/DL (ref 2.5–4.9)
PLATELET # BLD AUTO: 179 K/UL (ref 135–450)
PLATELET # BLD AUTO: 191 K/UL (ref 135–450)
PMV BLD AUTO: 8.6 FL (ref 5–10.5)
PMV BLD AUTO: 8.7 FL (ref 5–10.5)
POTASSIUM SERPL-SCNC: 3.3 MMOL/L (ref 3.5–5.1)
POTASSIUM SERPL-SCNC: 3.5 MMOL/L (ref 3.5–5.1)
PROTHROMBIN TIME: 19 SEC (ref 11.5–14.8)
RBC # BLD AUTO: 3.12 M/UL (ref 4–5.2)
RBC # BLD AUTO: 3.25 M/UL (ref 4–5.2)
SODIUM SERPL-SCNC: 134 MMOL/L (ref 136–145)
SODIUM SERPL-SCNC: 135 MMOL/L (ref 136–145)
WBC # BLD AUTO: 8 K/UL (ref 4–11)
WBC # BLD AUTO: 8.9 K/UL (ref 4–11)

## 2023-05-30 PROCEDURE — 80069 RENAL FUNCTION PANEL: CPT

## 2023-05-30 PROCEDURE — 97116 GAIT TRAINING THERAPY: CPT

## 2023-05-30 PROCEDURE — 94760 N-INVAS EAR/PLS OXIMETRY 1: CPT

## 2023-05-30 PROCEDURE — 6370000000 HC RX 637 (ALT 250 FOR IP): Performed by: FAMILY MEDICINE

## 2023-05-30 PROCEDURE — 94669 MECHANICAL CHEST WALL OSCILL: CPT

## 2023-05-30 PROCEDURE — 6360000002 HC RX W HCPCS: Performed by: NURSE PRACTITIONER

## 2023-05-30 PROCEDURE — 1200000000 HC SEMI PRIVATE

## 2023-05-30 PROCEDURE — 94761 N-INVAS EAR/PLS OXIMETRY MLT: CPT

## 2023-05-30 PROCEDURE — 99291 CRITICAL CARE FIRST HOUR: CPT | Performed by: INTERNAL MEDICINE

## 2023-05-30 PROCEDURE — 6370000000 HC RX 637 (ALT 250 FOR IP): Performed by: INTERNAL MEDICINE

## 2023-05-30 PROCEDURE — 85610 PROTHROMBIN TIME: CPT

## 2023-05-30 PROCEDURE — 97110 THERAPEUTIC EXERCISES: CPT

## 2023-05-30 PROCEDURE — 6370000000 HC RX 637 (ALT 250 FOR IP): Performed by: NURSE PRACTITIONER

## 2023-05-30 PROCEDURE — 6370000000 HC RX 637 (ALT 250 FOR IP): Performed by: HOSPITALIST

## 2023-05-30 PROCEDURE — 2580000003 HC RX 258: Performed by: NURSE PRACTITIONER

## 2023-05-30 PROCEDURE — 36415 COLL VENOUS BLD VENIPUNCTURE: CPT

## 2023-05-30 PROCEDURE — 85520 HEPARIN ASSAY: CPT

## 2023-05-30 PROCEDURE — 94640 AIRWAY INHALATION TREATMENT: CPT

## 2023-05-30 PROCEDURE — 85027 COMPLETE CBC AUTOMATED: CPT

## 2023-05-30 PROCEDURE — 85025 COMPLETE CBC W/AUTO DIFF WBC: CPT

## 2023-05-30 PROCEDURE — 2700000000 HC OXYGEN THERAPY PER DAY

## 2023-05-30 RX ORDER — DIPHENHYDRAMINE HCL 25 MG
25 TABLET ORAL NIGHTLY PRN
Status: DISCONTINUED | OUTPATIENT
Start: 2023-05-31 | End: 2023-05-30

## 2023-05-30 RX ORDER — DIPHENHYDRAMINE HCL 25 MG
25 TABLET ORAL NIGHTLY PRN
Status: DISCONTINUED | OUTPATIENT
Start: 2023-05-30 | End: 2023-06-08 | Stop reason: HOSPADM

## 2023-05-30 RX ORDER — METOLAZONE 2.5 MG/1
2.5 TABLET ORAL DAILY
Status: DISCONTINUED | OUTPATIENT
Start: 2023-05-30 | End: 2023-06-02

## 2023-05-30 RX ADMIN — HEPARIN SODIUM 15 UNITS/KG/HR: 10000 INJECTION, SOLUTION INTRAVENOUS at 07:14

## 2023-05-30 RX ADMIN — SPIRONOLACTONE 25 MG: 25 TABLET ORAL at 17:13

## 2023-05-30 RX ADMIN — GUAIFENESIN AND DEXTROMETHORPHAN 5 ML: 100; 10 SYRUP ORAL at 23:18

## 2023-05-30 RX ADMIN — OXYCODONE 5 MG: 5 TABLET ORAL at 06:25

## 2023-05-30 RX ADMIN — MELATONIN TAB 3 MG 3 MG: 3 TAB at 23:19

## 2023-05-30 RX ADMIN — IPRATROPIUM BROMIDE AND ALBUTEROL SULFATE 1 AMPULE: .5; 3 SOLUTION RESPIRATORY (INHALATION) at 20:38

## 2023-05-30 RX ADMIN — ACETAMINOPHEN 650 MG: 325 TABLET ORAL at 02:16

## 2023-05-30 RX ADMIN — ASPIRIN 81 MG: 81 TABLET, CHEWABLE ORAL at 08:45

## 2023-05-30 RX ADMIN — LEVOTHYROXINE SODIUM 88 MCG: 0.09 TABLET ORAL at 06:25

## 2023-05-30 RX ADMIN — INSULIN LISPRO 2 UNITS: 100 INJECTION, SOLUTION INTRAVENOUS; SUBCUTANEOUS at 17:15

## 2023-05-30 RX ADMIN — BENZONATATE 200 MG: 100 CAPSULE ORAL at 10:26

## 2023-05-30 RX ADMIN — IPRATROPIUM BROMIDE AND ALBUTEROL SULFATE 1 AMPULE: .5; 3 SOLUTION RESPIRATORY (INHALATION) at 08:20

## 2023-05-30 RX ADMIN — DIPHENHYDRAMINE HYDROCHLORIDE 25 MG: 25 TABLET ORAL at 23:19

## 2023-05-30 RX ADMIN — MIDODRINE HYDROCHLORIDE 2.5 MG: 5 TABLET ORAL at 12:20

## 2023-05-30 RX ADMIN — MELATONIN TAB 3 MG 3 MG: 3 TAB at 02:15

## 2023-05-30 RX ADMIN — IPRATROPIUM BROMIDE AND ALBUTEROL SULFATE 1 AMPULE: .5; 3 SOLUTION RESPIRATORY (INHALATION) at 02:35

## 2023-05-30 RX ADMIN — MIDODRINE HYDROCHLORIDE 2.5 MG: 5 TABLET ORAL at 17:13

## 2023-05-30 RX ADMIN — GUAIFENESIN AND DEXTROMETHORPHAN 5 ML: 100; 10 SYRUP ORAL at 18:34

## 2023-05-30 RX ADMIN — METOLAZONE 2.5 MG: 2.5 TABLET ORAL at 14:21

## 2023-05-30 RX ADMIN — FUROSEMIDE 10 MG/HR: 10 INJECTION, SOLUTION INTRAMUSCULAR; INTRAVENOUS at 07:15

## 2023-05-30 RX ADMIN — POTASSIUM CHLORIDE 40 MEQ: 1500 TABLET, EXTENDED RELEASE ORAL at 08:45

## 2023-05-30 RX ADMIN — MIDODRINE HYDROCHLORIDE 2.5 MG: 5 TABLET ORAL at 08:45

## 2023-05-30 RX ADMIN — POTASSIUM BICARBONATE 25 MEQ: 977.5 TABLET, EFFERVESCENT ORAL at 20:38

## 2023-05-30 RX ADMIN — BENZONATATE 200 MG: 100 CAPSULE ORAL at 18:34

## 2023-05-30 RX ADMIN — CALCITRIOL CAPSULES 0.25 MCG 0.25 MCG: 0.25 CAPSULE ORAL at 17:13

## 2023-05-30 RX ADMIN — POTASSIUM BICARBONATE 25 MEQ: 977.5 TABLET, EFFERVESCENT ORAL at 14:21

## 2023-05-30 RX ADMIN — METOCLOPRAMIDE HYDROCHLORIDE 5 MG: 10 TABLET ORAL at 08:45

## 2023-05-30 RX ADMIN — BENZONATATE 200 MG: 100 CAPSULE ORAL at 23:19

## 2023-05-30 RX ADMIN — METOCLOPRAMIDE HYDROCHLORIDE 5 MG: 10 TABLET ORAL at 17:12

## 2023-05-30 RX ADMIN — GUAIFENESIN AND DEXTROMETHORPHAN 5 ML: 100; 10 SYRUP ORAL at 10:26

## 2023-05-30 RX ADMIN — OXYCODONE 5 MG: 5 TABLET ORAL at 21:17

## 2023-05-30 RX ADMIN — TRAZODONE HYDROCHLORIDE 50 MG: 50 TABLET ORAL at 02:19

## 2023-05-30 ASSESSMENT — PAIN DESCRIPTION - PAIN TYPE
TYPE: CHRONIC PAIN

## 2023-05-30 ASSESSMENT — PAIN DESCRIPTION - ORIENTATION
ORIENTATION: MID;LOWER
ORIENTATION: MID

## 2023-05-30 ASSESSMENT — PAIN SCALES - GENERAL
PAINLEVEL_OUTOF10: 8
PAINLEVEL_OUTOF10: 8
PAINLEVEL_OUTOF10: 5
PAINLEVEL_OUTOF10: 9

## 2023-05-30 ASSESSMENT — PAIN DESCRIPTION - DESCRIPTORS
DESCRIPTORS: SHARP
DESCRIPTORS: SHARP
DESCRIPTORS: ACHING
DESCRIPTORS: ACHING

## 2023-05-30 ASSESSMENT — PAIN DESCRIPTION - LOCATION
LOCATION: BACK

## 2023-05-30 ASSESSMENT — PAIN DESCRIPTION - FREQUENCY: FREQUENCY: CONTINUOUS

## 2023-05-30 ASSESSMENT — PAIN DESCRIPTION - ONSET: ONSET: ON-GOING

## 2023-05-31 ENCOUNTER — APPOINTMENT (OUTPATIENT)
Dept: GENERAL RADIOLOGY | Age: 78
DRG: 177 | End: 2023-05-31
Payer: MEDICARE

## 2023-05-31 LAB
ANION GAP SERPL CALCULATED.3IONS-SCNC: 11 MMOL/L (ref 3–16)
ANION GAP SERPL CALCULATED.3IONS-SCNC: 6 MMOL/L (ref 3–16)
ANTI-XA UNFRAC HEPARIN: 0.33 IU/ML (ref 0.3–0.7)
BASOPHILS # BLD: 0 K/UL (ref 0–0.2)
BASOPHILS NFR BLD: 0.3 %
BUN SERPL-MCNC: 40 MG/DL (ref 7–20)
BUN SERPL-MCNC: 41 MG/DL (ref 7–20)
CALCIUM SERPL-MCNC: 9.6 MG/DL (ref 8.3–10.6)
CALCIUM SERPL-MCNC: 9.7 MG/DL (ref 8.3–10.6)
CHLORIDE SERPL-SCNC: 91 MMOL/L (ref 99–110)
CHLORIDE SERPL-SCNC: 95 MMOL/L (ref 99–110)
CO2 SERPL-SCNC: 34 MMOL/L (ref 21–32)
CO2 SERPL-SCNC: 35 MMOL/L (ref 21–32)
CREAT SERPL-MCNC: 2.1 MG/DL (ref 0.6–1.2)
CREAT SERPL-MCNC: 2.4 MG/DL (ref 0.6–1.2)
DEPRECATED RDW RBC AUTO: 17 % (ref 12.4–15.4)
EOSINOPHIL # BLD: 0.1 K/UL (ref 0–0.6)
EOSINOPHIL NFR BLD: 1.9 %
GFR SERPLBLD CREATININE-BSD FMLA CKD-EPI: 20 ML/MIN/{1.73_M2}
GFR SERPLBLD CREATININE-BSD FMLA CKD-EPI: 24 ML/MIN/{1.73_M2}
GLUCOSE BLD-MCNC: 126 MG/DL (ref 70–99)
GLUCOSE BLD-MCNC: 149 MG/DL (ref 70–99)
GLUCOSE BLD-MCNC: 163 MG/DL (ref 70–99)
GLUCOSE BLD-MCNC: 174 MG/DL (ref 70–99)
GLUCOSE SERPL-MCNC: 121 MG/DL (ref 70–99)
GLUCOSE SERPL-MCNC: 133 MG/DL (ref 70–99)
HCT VFR BLD AUTO: 29.4 % (ref 36–48)
HGB BLD-MCNC: 9.5 G/DL (ref 12–16)
INR PPP: 1.37 (ref 0.84–1.16)
LYMPHOCYTES # BLD: 0.6 K/UL (ref 1–5.1)
LYMPHOCYTES NFR BLD: 9.2 %
MAGNESIUM SERPL-MCNC: 2.4 MG/DL (ref 1.8–2.4)
MCH RBC QN AUTO: 29.6 PG (ref 26–34)
MCHC RBC AUTO-ENTMCNC: 32.4 G/DL (ref 31–36)
MCV RBC AUTO: 91.2 FL (ref 80–100)
MONOCYTES # BLD: 0.6 K/UL (ref 0–1.3)
MONOCYTES NFR BLD: 9.1 %
NEUTROPHILS # BLD: 5.5 K/UL (ref 1.7–7.7)
NEUTROPHILS NFR BLD: 79.5 %
NT-PROBNP SERPL-MCNC: ABNORMAL PG/ML (ref 0–449)
PERFORMED ON: ABNORMAL
PLATELET # BLD AUTO: 192 K/UL (ref 135–450)
PMV BLD AUTO: 8.5 FL (ref 5–10.5)
POTASSIUM SERPL-SCNC: 3.9 MMOL/L (ref 3.5–5.1)
POTASSIUM SERPL-SCNC: 4.4 MMOL/L (ref 3.5–5.1)
PROCALCITONIN SERPL IA-MCNC: 0.25 NG/ML (ref 0–0.15)
PROTHROMBIN TIME: 16.8 SEC (ref 11.5–14.8)
RBC # BLD AUTO: 3.22 M/UL (ref 4–5.2)
SODIUM SERPL-SCNC: 136 MMOL/L (ref 136–145)
SODIUM SERPL-SCNC: 136 MMOL/L (ref 136–145)
WBC # BLD AUTO: 6.9 K/UL (ref 4–11)

## 2023-05-31 PROCEDURE — 80048 BASIC METABOLIC PNL TOTAL CA: CPT

## 2023-05-31 PROCEDURE — 2700000000 HC OXYGEN THERAPY PER DAY

## 2023-05-31 PROCEDURE — 6370000000 HC RX 637 (ALT 250 FOR IP): Performed by: NURSE PRACTITIONER

## 2023-05-31 PROCEDURE — 6360000002 HC RX W HCPCS: Performed by: NURSE PRACTITIONER

## 2023-05-31 PROCEDURE — 85610 PROTHROMBIN TIME: CPT

## 2023-05-31 PROCEDURE — 6370000000 HC RX 637 (ALT 250 FOR IP): Performed by: INTERNAL MEDICINE

## 2023-05-31 PROCEDURE — 87449 NOS EACH ORGANISM AG IA: CPT

## 2023-05-31 PROCEDURE — 2580000003 HC RX 258: Performed by: NURSE PRACTITIONER

## 2023-05-31 PROCEDURE — 94761 N-INVAS EAR/PLS OXIMETRY MLT: CPT

## 2023-05-31 PROCEDURE — 94669 MECHANICAL CHEST WALL OSCILL: CPT

## 2023-05-31 PROCEDURE — 83735 ASSAY OF MAGNESIUM: CPT

## 2023-05-31 PROCEDURE — 6360000002 HC RX W HCPCS: Performed by: PHYSICIAN ASSISTANT

## 2023-05-31 PROCEDURE — 2580000003 HC RX 258: Performed by: FAMILY MEDICINE

## 2023-05-31 PROCEDURE — 2580000003 HC RX 258: Performed by: PHYSICIAN ASSISTANT

## 2023-05-31 PROCEDURE — 85025 COMPLETE CBC W/AUTO DIFF WBC: CPT

## 2023-05-31 PROCEDURE — 6370000000 HC RX 637 (ALT 250 FOR IP): Performed by: HOSPITALIST

## 2023-05-31 PROCEDURE — 1200000000 HC SEMI PRIVATE

## 2023-05-31 PROCEDURE — 92526 ORAL FUNCTION THERAPY: CPT

## 2023-05-31 PROCEDURE — 99232 SBSQ HOSP IP/OBS MODERATE 35: CPT | Performed by: INTERNAL MEDICINE

## 2023-05-31 PROCEDURE — 83880 ASSAY OF NATRIURETIC PEPTIDE: CPT

## 2023-05-31 PROCEDURE — 71045 X-RAY EXAM CHEST 1 VIEW: CPT

## 2023-05-31 PROCEDURE — 6370000000 HC RX 637 (ALT 250 FOR IP): Performed by: FAMILY MEDICINE

## 2023-05-31 PROCEDURE — 85520 HEPARIN ASSAY: CPT

## 2023-05-31 PROCEDURE — 94640 AIRWAY INHALATION TREATMENT: CPT

## 2023-05-31 PROCEDURE — 84145 PROCALCITONIN (PCT): CPT

## 2023-05-31 RX ORDER — CYCLOBENZAPRINE HCL 10 MG
10 TABLET ORAL 3 TIMES DAILY PRN
Status: DISPENSED | OUTPATIENT
Start: 2023-05-31 | End: 2023-06-02

## 2023-05-31 RX ADMIN — ASPIRIN 81 MG: 81 TABLET, CHEWABLE ORAL at 09:12

## 2023-05-31 RX ADMIN — LEVOTHYROXINE SODIUM 88 MCG: 0.09 TABLET ORAL at 09:12

## 2023-05-31 RX ADMIN — MIDODRINE HYDROCHLORIDE 2.5 MG: 5 TABLET ORAL at 17:11

## 2023-05-31 RX ADMIN — SPIRONOLACTONE 25 MG: 25 TABLET ORAL at 09:11

## 2023-05-31 RX ADMIN — SPIRONOLACTONE 25 MG: 25 TABLET ORAL at 17:10

## 2023-05-31 RX ADMIN — FUROSEMIDE 15 MG/HR: 10 INJECTION, SOLUTION INTRAMUSCULAR; INTRAVENOUS at 18:24

## 2023-05-31 RX ADMIN — METOCLOPRAMIDE HYDROCHLORIDE 5 MG: 10 TABLET ORAL at 17:10

## 2023-05-31 RX ADMIN — METOLAZONE 2.5 MG: 2.5 TABLET ORAL at 09:12

## 2023-05-31 RX ADMIN — OXYCODONE 5 MG: 5 TABLET ORAL at 20:16

## 2023-05-31 RX ADMIN — AMIODARONE HYDROCHLORIDE 100 MG: 200 TABLET ORAL at 09:11

## 2023-05-31 RX ADMIN — MELATONIN TAB 3 MG 3 MG: 3 TAB at 22:58

## 2023-05-31 RX ADMIN — Medication 10 ML: at 09:12

## 2023-05-31 RX ADMIN — MIDODRINE HYDROCHLORIDE 2.5 MG: 5 TABLET ORAL at 12:20

## 2023-05-31 RX ADMIN — BENZONATATE 200 MG: 100 CAPSULE ORAL at 09:12

## 2023-05-31 RX ADMIN — INSULIN GLARGINE 18 UNITS: 100 INJECTION, SOLUTION SUBCUTANEOUS at 09:12

## 2023-05-31 RX ADMIN — DIPHENHYDRAMINE HYDROCHLORIDE 25 MG: 25 TABLET ORAL at 22:58

## 2023-05-31 RX ADMIN — CALCITRIOL CAPSULES 0.25 MCG 0.25 MCG: 0.25 CAPSULE ORAL at 17:10

## 2023-05-31 RX ADMIN — FUROSEMIDE 10 MG/HR: 10 INJECTION, SOLUTION INTRAMUSCULAR; INTRAVENOUS at 05:43

## 2023-05-31 RX ADMIN — IPRATROPIUM BROMIDE AND ALBUTEROL SULFATE 1 AMPULE: .5; 3 SOLUTION RESPIRATORY (INHALATION) at 08:13

## 2023-05-31 RX ADMIN — ACETAMINOPHEN 650 MG: 325 TABLET ORAL at 22:58

## 2023-05-31 RX ADMIN — GUAIFENESIN AND DEXTROMETHORPHAN 5 ML: 100; 10 SYRUP ORAL at 09:12

## 2023-05-31 RX ADMIN — BENZONATATE 200 MG: 100 CAPSULE ORAL at 17:11

## 2023-05-31 RX ADMIN — GUAIFENESIN AND DEXTROMETHORPHAN 5 ML: 100; 10 SYRUP ORAL at 22:58

## 2023-05-31 RX ADMIN — GUAIFENESIN AND DEXTROMETHORPHAN 5 ML: 100; 10 SYRUP ORAL at 18:33

## 2023-05-31 RX ADMIN — POTASSIUM BICARBONATE 25 MEQ: 977.5 TABLET, EFFERVESCENT ORAL at 20:15

## 2023-05-31 RX ADMIN — POTASSIUM BICARBONATE 25 MEQ: 977.5 TABLET, EFFERVESCENT ORAL at 10:17

## 2023-05-31 RX ADMIN — GUAIFENESIN AND DEXTROMETHORPHAN 5 ML: 100; 10 SYRUP ORAL at 14:27

## 2023-05-31 RX ADMIN — IPRATROPIUM BROMIDE AND ALBUTEROL SULFATE 1 AMPULE: .5; 3 SOLUTION RESPIRATORY (INHALATION) at 19:38

## 2023-05-31 RX ADMIN — INSULIN LISPRO 4 UNITS: 100 INJECTION, SOLUTION INTRAVENOUS; SUBCUTANEOUS at 16:44

## 2023-05-31 RX ADMIN — MIDODRINE HYDROCHLORIDE 2.5 MG: 5 TABLET ORAL at 09:11

## 2023-05-31 RX ADMIN — METOCLOPRAMIDE HYDROCHLORIDE 5 MG: 10 TABLET ORAL at 09:11

## 2023-05-31 RX ADMIN — HEPARIN SODIUM 15 UNITS/KG/HR: 10000 INJECTION, SOLUTION INTRAVENOUS at 03:21

## 2023-05-31 ASSESSMENT — PAIN SCALES - GENERAL
PAINLEVEL_OUTOF10: 0
PAINLEVEL_OUTOF10: 8
PAINLEVEL_OUTOF10: 0
PAINLEVEL_OUTOF10: 0

## 2023-05-31 ASSESSMENT — PAIN DESCRIPTION - LOCATION: LOCATION: HAND

## 2023-05-31 ASSESSMENT — PAIN DESCRIPTION - ORIENTATION: ORIENTATION: RIGHT;LEFT

## 2023-05-31 ASSESSMENT — PAIN - FUNCTIONAL ASSESSMENT: PAIN_FUNCTIONAL_ASSESSMENT: PREVENTS OR INTERFERES WITH ALL ACTIVE AND SOME PASSIVE ACTIVITIES

## 2023-05-31 ASSESSMENT — PAIN DESCRIPTION - ONSET: ONSET: ON-GOING

## 2023-05-31 ASSESSMENT — PAIN DESCRIPTION - PAIN TYPE: TYPE: ACUTE PAIN

## 2023-05-31 ASSESSMENT — PAIN DESCRIPTION - DESCRIPTORS: DESCRIPTORS: CRAMPING

## 2023-05-31 ASSESSMENT — PAIN DESCRIPTION - FREQUENCY: FREQUENCY: CONTINUOUS

## 2023-06-01 PROBLEM — I48.20 CHRONIC ATRIAL FIBRILLATION (HCC): Status: ACTIVE | Noted: 2023-06-01

## 2023-06-01 LAB
ANION GAP SERPL CALCULATED.3IONS-SCNC: 10 MMOL/L (ref 3–16)
ANTI-XA UNFRAC HEPARIN: 0.19 IU/ML (ref 0.3–0.7)
BASOPHILS # BLD: 0 K/UL (ref 0–0.2)
BASOPHILS NFR BLD: 0.6 %
BUN SERPL-MCNC: 42 MG/DL (ref 7–20)
CALCIUM SERPL-MCNC: 9.6 MG/DL (ref 8.3–10.6)
CHLORIDE SERPL-SCNC: 93 MMOL/L (ref 99–110)
CO2 SERPL-SCNC: 35 MMOL/L (ref 21–32)
CREAT SERPL-MCNC: 2.3 MG/DL (ref 0.6–1.2)
DEPRECATED RDW RBC AUTO: 17 % (ref 12.4–15.4)
EOSINOPHIL # BLD: 0.1 K/UL (ref 0–0.6)
EOSINOPHIL NFR BLD: 2.4 %
GFR SERPLBLD CREATININE-BSD FMLA CKD-EPI: 21 ML/MIN/{1.73_M2}
GLUCOSE BLD-MCNC: 116 MG/DL (ref 70–99)
GLUCOSE BLD-MCNC: 147 MG/DL (ref 70–99)
GLUCOSE BLD-MCNC: 203 MG/DL (ref 70–99)
GLUCOSE BLD-MCNC: 96 MG/DL (ref 70–99)
GLUCOSE SERPL-MCNC: 105 MG/DL (ref 70–99)
HCT VFR BLD AUTO: 30.2 % (ref 36–48)
HGB BLD-MCNC: 9.8 G/DL (ref 12–16)
INR PPP: 1.23 (ref 0.84–1.16)
LEGIONELLA AG UR QL: NORMAL
LYMPHOCYTES # BLD: 0.7 K/UL (ref 1–5.1)
LYMPHOCYTES NFR BLD: 12 %
MCH RBC QN AUTO: 29.2 PG (ref 26–34)
MCHC RBC AUTO-ENTMCNC: 32.4 G/DL (ref 31–36)
MCV RBC AUTO: 90.3 FL (ref 80–100)
MONOCYTES # BLD: 0.7 K/UL (ref 0–1.3)
MONOCYTES NFR BLD: 11.3 %
NEUTROPHILS # BLD: 4.6 K/UL (ref 1.7–7.7)
NEUTROPHILS NFR BLD: 73.7 %
NT-PROBNP SERPL-MCNC: ABNORMAL PG/ML (ref 0–449)
PERFORMED ON: ABNORMAL
PERFORMED ON: NORMAL
PLATELET # BLD AUTO: 241 K/UL (ref 135–450)
PMV BLD AUTO: 8.5 FL (ref 5–10.5)
POTASSIUM SERPL-SCNC: 4.1 MMOL/L (ref 3.5–5.1)
PROTHROMBIN TIME: 15.5 SEC (ref 11.5–14.8)
RBC # BLD AUTO: 3.34 M/UL (ref 4–5.2)
S PNEUM AG UR QL: NORMAL
SODIUM SERPL-SCNC: 138 MMOL/L (ref 136–145)
WBC # BLD AUTO: 6.3 K/UL (ref 4–11)

## 2023-06-01 PROCEDURE — 2580000003 HC RX 258: Performed by: CLINICAL NURSE SPECIALIST

## 2023-06-01 PROCEDURE — 85520 HEPARIN ASSAY: CPT

## 2023-06-01 PROCEDURE — 99233 SBSQ HOSP IP/OBS HIGH 50: CPT | Performed by: CLINICAL NURSE SPECIALIST

## 2023-06-01 PROCEDURE — 6370000000 HC RX 637 (ALT 250 FOR IP): Performed by: FAMILY MEDICINE

## 2023-06-01 PROCEDURE — 80048 BASIC METABOLIC PNL TOTAL CA: CPT

## 2023-06-01 PROCEDURE — 97530 THERAPEUTIC ACTIVITIES: CPT

## 2023-06-01 PROCEDURE — 6370000000 HC RX 637 (ALT 250 FOR IP): Performed by: HOSPITALIST

## 2023-06-01 PROCEDURE — 94761 N-INVAS EAR/PLS OXIMETRY MLT: CPT

## 2023-06-01 PROCEDURE — 6370000000 HC RX 637 (ALT 250 FOR IP): Performed by: NURSE PRACTITIONER

## 2023-06-01 PROCEDURE — 94640 AIRWAY INHALATION TREATMENT: CPT

## 2023-06-01 PROCEDURE — 6370000000 HC RX 637 (ALT 250 FOR IP): Performed by: INTERNAL MEDICINE

## 2023-06-01 PROCEDURE — 2580000003 HC RX 258: Performed by: FAMILY MEDICINE

## 2023-06-01 PROCEDURE — 2580000003 HC RX 258: Performed by: PHYSICIAN ASSISTANT

## 2023-06-01 PROCEDURE — 1200000000 HC SEMI PRIVATE

## 2023-06-01 PROCEDURE — 85025 COMPLETE CBC W/AUTO DIFF WBC: CPT

## 2023-06-01 PROCEDURE — 6360000002 HC RX W HCPCS: Performed by: CLINICAL NURSE SPECIALIST

## 2023-06-01 PROCEDURE — 6360000002 HC RX W HCPCS: Performed by: NURSE PRACTITIONER

## 2023-06-01 PROCEDURE — 2580000003 HC RX 258: Performed by: HOSPITALIST

## 2023-06-01 PROCEDURE — 6360000002 HC RX W HCPCS: Performed by: PHYSICIAN ASSISTANT

## 2023-06-01 PROCEDURE — 83880 ASSAY OF NATRIURETIC PEPTIDE: CPT

## 2023-06-01 PROCEDURE — 6370000000 HC RX 637 (ALT 250 FOR IP): Performed by: CLINICAL NURSE SPECIALIST

## 2023-06-01 PROCEDURE — 85610 PROTHROMBIN TIME: CPT

## 2023-06-01 PROCEDURE — 97110 THERAPEUTIC EXERCISES: CPT

## 2023-06-01 PROCEDURE — 2700000000 HC OXYGEN THERAPY PER DAY

## 2023-06-01 PROCEDURE — 36415 COLL VENOUS BLD VENIPUNCTURE: CPT

## 2023-06-01 RX ORDER — WARFARIN SODIUM 5 MG/1
5 TABLET ORAL
Status: DISCONTINUED | OUTPATIENT
Start: 2023-06-03 | End: 2023-06-03 | Stop reason: DRUGHIGH

## 2023-06-01 RX ORDER — WARFARIN SODIUM 2.5 MG/1
2.5 TABLET ORAL
Status: DISCONTINUED | OUTPATIENT
Start: 2023-06-02 | End: 2023-06-02

## 2023-06-01 RX ORDER — WARFARIN SODIUM 5 MG/1
5 TABLET ORAL DAILY
Status: DISCONTINUED | OUTPATIENT
Start: 2023-06-01 | End: 2023-06-01

## 2023-06-01 RX ORDER — MIDODRINE HYDROCHLORIDE 5 MG/1
5 TABLET ORAL
Status: DISCONTINUED | OUTPATIENT
Start: 2023-06-01 | End: 2023-06-08 | Stop reason: HOSPADM

## 2023-06-01 RX ADMIN — Medication 10 ML: at 10:56

## 2023-06-01 RX ADMIN — HEPARIN SODIUM 2000 UNITS: 1000 INJECTION INTRAVENOUS; SUBCUTANEOUS at 06:53

## 2023-06-01 RX ADMIN — FUROSEMIDE 15 MG/HR: 10 INJECTION, SOLUTION INTRAMUSCULAR; INTRAVENOUS at 08:22

## 2023-06-01 RX ADMIN — POLYETHYLENE GLYCOL 3350 17 G: 17 POWDER, FOR SOLUTION ORAL at 09:32

## 2023-06-01 RX ADMIN — FUROSEMIDE 20 MG/HR: 10 INJECTION, SOLUTION INTRAMUSCULAR; INTRAVENOUS at 14:27

## 2023-06-01 RX ADMIN — INSULIN GLARGINE 18 UNITS: 100 INJECTION, SOLUTION SUBCUTANEOUS at 09:42

## 2023-06-01 RX ADMIN — MELATONIN TAB 3 MG 3 MG: 3 TAB at 21:00

## 2023-06-01 RX ADMIN — BENZONATATE 200 MG: 100 CAPSULE ORAL at 21:00

## 2023-06-01 RX ADMIN — SPIRONOLACTONE 25 MG: 25 TABLET ORAL at 16:28

## 2023-06-01 RX ADMIN — HEPARIN SODIUM 15 UNITS/KG/HR: 10000 INJECTION, SOLUTION INTRAVENOUS at 01:17

## 2023-06-01 RX ADMIN — METOPROLOL SUCCINATE 50 MG: 50 TABLET, EXTENDED RELEASE ORAL at 09:33

## 2023-06-01 RX ADMIN — AMIODARONE HYDROCHLORIDE 100 MG: 200 TABLET ORAL at 09:32

## 2023-06-01 RX ADMIN — Medication 10 ML: at 06:53

## 2023-06-01 RX ADMIN — GUAIFENESIN AND DEXTROMETHORPHAN 5 ML: 100; 10 SYRUP ORAL at 16:28

## 2023-06-01 RX ADMIN — SPIRONOLACTONE 25 MG: 25 TABLET ORAL at 09:33

## 2023-06-01 RX ADMIN — DICLOFENAC SODIUM 2 G: 10 GEL TOPICAL at 09:40

## 2023-06-01 RX ADMIN — METOLAZONE 2.5 MG: 2.5 TABLET ORAL at 09:33

## 2023-06-01 RX ADMIN — Medication 10 ML: at 21:01

## 2023-06-01 RX ADMIN — CALCITRIOL CAPSULES 0.25 MCG 0.25 MCG: 0.25 CAPSULE ORAL at 16:29

## 2023-06-01 RX ADMIN — GUAIFENESIN AND DEXTROMETHORPHAN 5 ML: 100; 10 SYRUP ORAL at 09:39

## 2023-06-01 RX ADMIN — MIDODRINE HYDROCHLORIDE 2.5 MG: 5 TABLET ORAL at 09:32

## 2023-06-01 RX ADMIN — WARFARIN SODIUM 6 MG: 5 TABLET ORAL at 16:28

## 2023-06-01 RX ADMIN — METOCLOPRAMIDE HYDROCHLORIDE 5 MG: 10 TABLET ORAL at 09:33

## 2023-06-01 RX ADMIN — IPRATROPIUM BROMIDE AND ALBUTEROL SULFATE 1 AMPULE: .5; 3 SOLUTION RESPIRATORY (INHALATION) at 09:20

## 2023-06-01 RX ADMIN — STANDARDIZED SENNA CONCENTRATE AND DOCUSATE SODIUM 2 TABLET: 8.6; 5 TABLET ORAL at 09:32

## 2023-06-01 RX ADMIN — DIPHENHYDRAMINE HYDROCHLORIDE 25 MG: 25 TABLET ORAL at 21:00

## 2023-06-01 RX ADMIN — MIDODRINE HYDROCHLORIDE 5 MG: 5 TABLET ORAL at 12:07

## 2023-06-01 RX ADMIN — INSULIN LISPRO 4 UNITS: 100 INJECTION, SOLUTION INTRAVENOUS; SUBCUTANEOUS at 09:42

## 2023-06-01 RX ADMIN — IPRATROPIUM BROMIDE AND ALBUTEROL SULFATE 1 AMPULE: .5; 3 SOLUTION RESPIRATORY (INHALATION) at 20:30

## 2023-06-01 RX ADMIN — GUAIFENESIN AND DEXTROMETHORPHAN 5 ML: 100; 10 SYRUP ORAL at 21:00

## 2023-06-01 RX ADMIN — BENZONATATE 200 MG: 100 CAPSULE ORAL at 04:44

## 2023-06-01 RX ADMIN — BENZONATATE 200 MG: 100 CAPSULE ORAL at 12:07

## 2023-06-01 RX ADMIN — ASPIRIN 81 MG: 81 TABLET, CHEWABLE ORAL at 09:33

## 2023-06-01 RX ADMIN — LEVOTHYROXINE SODIUM 88 MCG: 0.09 TABLET ORAL at 06:22

## 2023-06-01 RX ADMIN — MIDODRINE HYDROCHLORIDE 5 MG: 5 TABLET ORAL at 16:28

## 2023-06-01 RX ADMIN — OXYCODONE 5 MG: 5 TABLET ORAL at 22:36

## 2023-06-01 RX ADMIN — FUROSEMIDE 20 MG/HR: 10 INJECTION, SOLUTION INTRAMUSCULAR; INTRAVENOUS at 19:41

## 2023-06-01 RX ADMIN — METOCLOPRAMIDE HYDROCHLORIDE 5 MG: 10 TABLET ORAL at 16:29

## 2023-06-01 RX ADMIN — FUROSEMIDE 15 MG/HR: 10 INJECTION, SOLUTION INTRAMUSCULAR; INTRAVENOUS at 01:14

## 2023-06-01 ASSESSMENT — PAIN SCALES - GENERAL
PAINLEVEL_OUTOF10: 0
PAINLEVEL_OUTOF10: 8
PAINLEVEL_OUTOF10: 0

## 2023-06-02 ENCOUNTER — APPOINTMENT (OUTPATIENT)
Dept: CT IMAGING | Age: 78
DRG: 177 | End: 2023-06-02
Attending: INTERNAL MEDICINE
Payer: MEDICARE

## 2023-06-02 PROBLEM — J45.41 MODERATE PERSISTENT ASTHMA WITH EXACERBATION: Status: ACTIVE | Noted: 2023-06-02

## 2023-06-02 PROBLEM — J96.01 ACUTE HYPOXEMIC RESPIRATORY FAILURE (HCC): Status: ACTIVE | Noted: 2023-06-02

## 2023-06-02 LAB
ALBUMIN SERPL-MCNC: 3.4 G/DL (ref 3.4–5)
ALBUMIN/GLOB SERPL: 0.9 {RATIO} (ref 1.1–2.2)
ALP SERPL-CCNC: 86 U/L (ref 40–129)
ALT SERPL-CCNC: 14 U/L (ref 10–40)
ANION GAP SERPL CALCULATED.3IONS-SCNC: 11 MMOL/L (ref 3–16)
AST SERPL-CCNC: 17 U/L (ref 15–37)
BASOPHILS # BLD: 0 K/UL (ref 0–0.2)
BASOPHILS NFR BLD: 0.4 %
BILIRUB SERPL-MCNC: 0.5 MG/DL (ref 0–1)
BUN SERPL-MCNC: 51 MG/DL (ref 7–20)
CALCIUM SERPL-MCNC: 9.9 MG/DL (ref 8.3–10.6)
CHLORIDE SERPL-SCNC: 89 MMOL/L (ref 99–110)
CO2 SERPL-SCNC: 33 MMOL/L (ref 21–32)
CREAT SERPL-MCNC: 2.8 MG/DL (ref 0.6–1.2)
DEPRECATED RDW RBC AUTO: 17.6 % (ref 12.4–15.4)
EOSINOPHIL # BLD: 0.1 K/UL (ref 0–0.6)
EOSINOPHIL NFR BLD: 1.9 %
GFR SERPLBLD CREATININE-BSD FMLA CKD-EPI: 17 ML/MIN/{1.73_M2}
GLUCOSE BLD-MCNC: 145 MG/DL (ref 70–99)
GLUCOSE BLD-MCNC: 163 MG/DL (ref 70–99)
GLUCOSE BLD-MCNC: 221 MG/DL (ref 70–99)
GLUCOSE BLD-MCNC: 298 MG/DL (ref 70–99)
GLUCOSE SERPL-MCNC: 155 MG/DL (ref 70–99)
HCT VFR BLD AUTO: 32 % (ref 36–48)
HGB BLD-MCNC: 10.3 G/DL (ref 12–16)
LYMPHOCYTES # BLD: 0.7 K/UL (ref 1–5.1)
LYMPHOCYTES NFR BLD: 9.8 %
MCH RBC QN AUTO: 29.2 PG (ref 26–34)
MCHC RBC AUTO-ENTMCNC: 32.2 G/DL (ref 31–36)
MCV RBC AUTO: 90.6 FL (ref 80–100)
MONOCYTES # BLD: 0.7 K/UL (ref 0–1.3)
MONOCYTES NFR BLD: 9.9 %
NEUTROPHILS # BLD: 5.4 K/UL (ref 1.7–7.7)
NEUTROPHILS NFR BLD: 78 %
PERFORMED ON: ABNORMAL
PLATELET # BLD AUTO: 234 K/UL (ref 135–450)
PMV BLD AUTO: 8.4 FL (ref 5–10.5)
POTASSIUM SERPL-SCNC: 4.3 MMOL/L (ref 3.5–5.1)
PROCALCITONIN SERPL IA-MCNC: 0.33 NG/ML (ref 0–0.15)
PROT SERPL-MCNC: 7 G/DL (ref 6.4–8.2)
RBC # BLD AUTO: 3.53 M/UL (ref 4–5.2)
SODIUM SERPL-SCNC: 133 MMOL/L (ref 136–145)
WBC # BLD AUTO: 6.9 K/UL (ref 4–11)

## 2023-06-02 PROCEDURE — 85025 COMPLETE CBC W/AUTO DIFF WBC: CPT

## 2023-06-02 PROCEDURE — 6360000002 HC RX W HCPCS: Performed by: INTERNAL MEDICINE

## 2023-06-02 PROCEDURE — 1200000000 HC SEMI PRIVATE

## 2023-06-02 PROCEDURE — 6370000000 HC RX 637 (ALT 250 FOR IP): Performed by: NURSE PRACTITIONER

## 2023-06-02 PROCEDURE — 85610 PROTHROMBIN TIME: CPT

## 2023-06-02 PROCEDURE — 6370000000 HC RX 637 (ALT 250 FOR IP): Performed by: CLINICAL NURSE SPECIALIST

## 2023-06-02 PROCEDURE — 80053 COMPREHEN METABOLIC PANEL: CPT

## 2023-06-02 PROCEDURE — 6370000000 HC RX 637 (ALT 250 FOR IP): Performed by: INTERNAL MEDICINE

## 2023-06-02 PROCEDURE — 99233 SBSQ HOSP IP/OBS HIGH 50: CPT | Performed by: CLINICAL NURSE SPECIALIST

## 2023-06-02 PROCEDURE — 99222 1ST HOSP IP/OBS MODERATE 55: CPT | Performed by: INTERNAL MEDICINE

## 2023-06-02 PROCEDURE — 6370000000 HC RX 637 (ALT 250 FOR IP): Performed by: FAMILY MEDICINE

## 2023-06-02 PROCEDURE — 94640 AIRWAY INHALATION TREATMENT: CPT

## 2023-06-02 PROCEDURE — 97530 THERAPEUTIC ACTIVITIES: CPT

## 2023-06-02 PROCEDURE — 84145 PROCALCITONIN (PCT): CPT

## 2023-06-02 PROCEDURE — 6370000000 HC RX 637 (ALT 250 FOR IP): Performed by: HOSPITALIST

## 2023-06-02 PROCEDURE — 6360000002 HC RX W HCPCS: Performed by: CLINICAL NURSE SPECIALIST

## 2023-06-02 PROCEDURE — 94669 MECHANICAL CHEST WALL OSCILL: CPT

## 2023-06-02 PROCEDURE — 2580000003 HC RX 258: Performed by: INTERNAL MEDICINE

## 2023-06-02 PROCEDURE — 94761 N-INVAS EAR/PLS OXIMETRY MLT: CPT

## 2023-06-02 PROCEDURE — 2700000000 HC OXYGEN THERAPY PER DAY

## 2023-06-02 PROCEDURE — 97112 NEUROMUSCULAR REEDUCATION: CPT

## 2023-06-02 PROCEDURE — 2580000003 HC RX 258: Performed by: FAMILY MEDICINE

## 2023-06-02 PROCEDURE — 36415 COLL VENOUS BLD VENIPUNCTURE: CPT

## 2023-06-02 PROCEDURE — 2580000003 HC RX 258: Performed by: CLINICAL NURSE SPECIALIST

## 2023-06-02 PROCEDURE — 71250 CT THORAX DX C-: CPT

## 2023-06-02 PROCEDURE — 2580000003 HC RX 258: Performed by: HOSPITALIST

## 2023-06-02 RX ORDER — IPRATROPIUM BROMIDE AND ALBUTEROL SULFATE 2.5; .5 MG/3ML; MG/3ML
1 SOLUTION RESPIRATORY (INHALATION) 3 TIMES DAILY
Status: DISCONTINUED | OUTPATIENT
Start: 2023-06-02 | End: 2023-06-08 | Stop reason: HOSPADM

## 2023-06-02 RX ORDER — WARFARIN SODIUM 5 MG/1
5 TABLET ORAL
Status: COMPLETED | OUTPATIENT
Start: 2023-06-02 | End: 2023-06-02

## 2023-06-02 RX ORDER — METHYLPREDNISOLONE SODIUM SUCCINATE 40 MG/ML
40 INJECTION, POWDER, LYOPHILIZED, FOR SOLUTION INTRAMUSCULAR; INTRAVENOUS EVERY 6 HOURS
Status: DISCONTINUED | OUTPATIENT
Start: 2023-06-02 | End: 2023-06-06

## 2023-06-02 RX ORDER — PROMETHAZINE HYDROCHLORIDE AND CODEINE PHOSPHATE 6.25; 1 MG/5ML; MG/5ML
5 SYRUP ORAL EVERY 4 HOURS PRN
Status: DISCONTINUED | OUTPATIENT
Start: 2023-06-02 | End: 2023-06-08 | Stop reason: HOSPADM

## 2023-06-02 RX ORDER — WARFARIN SODIUM 2.5 MG/1
2.5 TABLET ORAL
Status: DISCONTINUED | OUTPATIENT
Start: 2023-06-05 | End: 2023-06-03 | Stop reason: DRUGHIGH

## 2023-06-02 RX ADMIN — INSULIN LISPRO 4 UNITS: 100 INJECTION, SOLUTION INTRAVENOUS; SUBCUTANEOUS at 16:47

## 2023-06-02 RX ADMIN — INSULIN GLARGINE 18 UNITS: 100 INJECTION, SOLUTION SUBCUTANEOUS at 10:44

## 2023-06-02 RX ADMIN — AMIODARONE HYDROCHLORIDE 100 MG: 200 TABLET ORAL at 10:43

## 2023-06-02 RX ADMIN — Medication 5 ML: at 12:06

## 2023-06-02 RX ADMIN — ASPIRIN 81 MG: 81 TABLET, CHEWABLE ORAL at 10:43

## 2023-06-02 RX ADMIN — DIPHENHYDRAMINE HYDROCHLORIDE 25 MG: 25 TABLET ORAL at 21:02

## 2023-06-02 RX ADMIN — BENZONATATE 200 MG: 100 CAPSULE ORAL at 21:02

## 2023-06-02 RX ADMIN — Medication 10 ML: at 10:47

## 2023-06-02 RX ADMIN — CALCITRIOL CAPSULES 0.25 MCG 0.25 MCG: 0.25 CAPSULE ORAL at 16:05

## 2023-06-02 RX ADMIN — Medication 10 ML: at 21:03

## 2023-06-02 RX ADMIN — DICLOFENAC SODIUM 2 G: 10 GEL TOPICAL at 10:43

## 2023-06-02 RX ADMIN — METHYLPREDNISOLONE SODIUM SUCCINATE 40 MG: 40 INJECTION INTRAMUSCULAR; INTRAVENOUS at 23:58

## 2023-06-02 RX ADMIN — METHYLPREDNISOLONE SODIUM SUCCINATE 40 MG: 40 INJECTION INTRAMUSCULAR; INTRAVENOUS at 14:17

## 2023-06-02 RX ADMIN — MIDODRINE HYDROCHLORIDE 5 MG: 5 TABLET ORAL at 16:05

## 2023-06-02 RX ADMIN — STANDARDIZED SENNA CONCENTRATE AND DOCUSATE SODIUM 2 TABLET: 8.6; 5 TABLET ORAL at 10:43

## 2023-06-02 RX ADMIN — GUAIFENESIN AND DEXTROMETHORPHAN 5 ML: 100; 10 SYRUP ORAL at 01:04

## 2023-06-02 RX ADMIN — METOCLOPRAMIDE HYDROCHLORIDE 5 MG: 10 TABLET ORAL at 10:47

## 2023-06-02 RX ADMIN — BENZONATATE 200 MG: 100 CAPSULE ORAL at 10:43

## 2023-06-02 RX ADMIN — INSULIN LISPRO 2 UNITS: 100 INJECTION, SOLUTION INTRAVENOUS; SUBCUTANEOUS at 16:46

## 2023-06-02 RX ADMIN — METOCLOPRAMIDE HYDROCHLORIDE 5 MG: 10 TABLET ORAL at 16:05

## 2023-06-02 RX ADMIN — Medication 10 ML: at 10:50

## 2023-06-02 RX ADMIN — METOPROLOL SUCCINATE 50 MG: 50 TABLET, EXTENDED RELEASE ORAL at 10:43

## 2023-06-02 RX ADMIN — WARFARIN SODIUM 5 MG: 5 TABLET ORAL at 21:02

## 2023-06-02 RX ADMIN — FUROSEMIDE 20 MG/HR: 10 INJECTION, SOLUTION INTRAMUSCULAR; INTRAVENOUS at 08:50

## 2023-06-02 RX ADMIN — IPRATROPIUM BROMIDE AND ALBUTEROL SULFATE 1 AMPULE: .5; 3 SOLUTION RESPIRATORY (INHALATION) at 19:29

## 2023-06-02 RX ADMIN — MIDODRINE HYDROCHLORIDE 5 MG: 5 TABLET ORAL at 10:43

## 2023-06-02 RX ADMIN — OXYCODONE 5 MG: 5 TABLET ORAL at 17:19

## 2023-06-02 RX ADMIN — LEVOTHYROXINE SODIUM 88 MCG: 0.09 TABLET ORAL at 06:22

## 2023-06-02 RX ADMIN — Medication 5 ML: at 21:01

## 2023-06-02 RX ADMIN — IPRATROPIUM BROMIDE AND ALBUTEROL SULFATE 1 AMPULE: .5; 3 SOLUTION RESPIRATORY (INHALATION) at 09:16

## 2023-06-02 RX ADMIN — PIPERACILLIN AND TAZOBACTAM 4500 MG: 4; .5 INJECTION, POWDER, LYOPHILIZED, FOR SOLUTION INTRAVENOUS at 16:44

## 2023-06-02 RX ADMIN — IPRATROPIUM BROMIDE AND ALBUTEROL SULFATE 1 AMPULE: .5; 3 SOLUTION RESPIRATORY (INHALATION) at 14:44

## 2023-06-02 RX ADMIN — Medication 21 ML: at 14:17

## 2023-06-02 RX ADMIN — SPIRONOLACTONE 25 MG: 25 TABLET ORAL at 10:43

## 2023-06-02 RX ADMIN — Medication 5 ML: at 17:19

## 2023-06-02 RX ADMIN — MIDODRINE HYDROCHLORIDE 5 MG: 5 TABLET ORAL at 14:16

## 2023-06-02 RX ADMIN — METHYLPREDNISOLONE SODIUM SUCCINATE 40 MG: 40 INJECTION INTRAMUSCULAR; INTRAVENOUS at 21:03

## 2023-06-02 RX ADMIN — MELATONIN TAB 3 MG 3 MG: 3 TAB at 21:02

## 2023-06-02 RX ADMIN — FUROSEMIDE 20 MG/HR: 10 INJECTION, SOLUTION INTRAMUSCULAR; INTRAVENOUS at 01:16

## 2023-06-02 ASSESSMENT — ENCOUNTER SYMPTOMS
SHORTNESS OF BREATH: 1
CHEST TIGHTNESS: 1
COUGH: 1
ABDOMINAL DISTENTION: 1

## 2023-06-02 ASSESSMENT — PAIN SCALES - GENERAL
PAINLEVEL_OUTOF10: 0
PAINLEVEL_OUTOF10: 6
PAINLEVEL_OUTOF10: 0
PAINLEVEL_OUTOF10: 8
PAINLEVEL_OUTOF10: 0

## 2023-06-03 LAB
ANION GAP SERPL CALCULATED.3IONS-SCNC: 11 MMOL/L (ref 3–16)
BASOPHILS # BLD: 0 K/UL (ref 0–0.2)
BASOPHILS NFR BLD: 0.2 %
BUN SERPL-MCNC: 67 MG/DL (ref 7–20)
CALCIUM SERPL-MCNC: 9.4 MG/DL (ref 8.3–10.6)
CHLORIDE SERPL-SCNC: 93 MMOL/L (ref 99–110)
CO2 SERPL-SCNC: 30 MMOL/L (ref 21–32)
CREAT SERPL-MCNC: 2.5 MG/DL (ref 0.6–1.2)
DEPRECATED RDW RBC AUTO: 17.4 % (ref 12.4–15.4)
EOSINOPHIL # BLD: 0 K/UL (ref 0–0.6)
EOSINOPHIL NFR BLD: 0.1 %
GFR SERPLBLD CREATININE-BSD FMLA CKD-EPI: 19 ML/MIN/{1.73_M2}
GLUCOSE BLD-MCNC: 177 MG/DL (ref 70–99)
GLUCOSE BLD-MCNC: 281 MG/DL (ref 70–99)
GLUCOSE BLD-MCNC: 299 MG/DL (ref 70–99)
GLUCOSE BLD-MCNC: 362 MG/DL (ref 70–99)
GLUCOSE SERPL-MCNC: 262 MG/DL (ref 70–99)
HCT VFR BLD AUTO: 31.4 % (ref 36–48)
HGB BLD-MCNC: 9.9 G/DL (ref 12–16)
INR PPP: 1.13 (ref 0.84–1.16)
INR PPP: 1.16 (ref 0.84–1.16)
LYMPHOCYTES # BLD: 0.4 K/UL (ref 1–5.1)
LYMPHOCYTES NFR BLD: 7.8 %
MCH RBC QN AUTO: 29.1 PG (ref 26–34)
MCHC RBC AUTO-ENTMCNC: 31.7 G/DL (ref 31–36)
MCV RBC AUTO: 92.1 FL (ref 80–100)
MONOCYTES # BLD: 0.1 K/UL (ref 0–1.3)
MONOCYTES NFR BLD: 1.3 %
NEUTROPHILS # BLD: 4.5 K/UL (ref 1.7–7.7)
NEUTROPHILS NFR BLD: 90.6 %
PERFORMED ON: ABNORMAL
PLATELET # BLD AUTO: 203 K/UL (ref 135–450)
PMV BLD AUTO: 8.7 FL (ref 5–10.5)
POTASSIUM SERPL-SCNC: 3.8 MMOL/L (ref 3.5–5.1)
PROTHROMBIN TIME: 14.5 SEC (ref 11.5–14.8)
PROTHROMBIN TIME: 14.8 SEC (ref 11.5–14.8)
RBC # BLD AUTO: 3.41 M/UL (ref 4–5.2)
SODIUM SERPL-SCNC: 134 MMOL/L (ref 136–145)
WBC # BLD AUTO: 5 K/UL (ref 4–11)

## 2023-06-03 PROCEDURE — 6360000002 HC RX W HCPCS: Performed by: PHYSICIAN ASSISTANT

## 2023-06-03 PROCEDURE — 2580000003 HC RX 258: Performed by: FAMILY MEDICINE

## 2023-06-03 PROCEDURE — 85025 COMPLETE CBC W/AUTO DIFF WBC: CPT

## 2023-06-03 PROCEDURE — 99233 SBSQ HOSP IP/OBS HIGH 50: CPT | Performed by: NURSE PRACTITIONER

## 2023-06-03 PROCEDURE — 2580000003 HC RX 258: Performed by: HOSPITALIST

## 2023-06-03 PROCEDURE — 94640 AIRWAY INHALATION TREATMENT: CPT

## 2023-06-03 PROCEDURE — 80048 BASIC METABOLIC PNL TOTAL CA: CPT

## 2023-06-03 PROCEDURE — 6370000000 HC RX 637 (ALT 250 FOR IP): Performed by: FAMILY MEDICINE

## 2023-06-03 PROCEDURE — 2700000000 HC OXYGEN THERAPY PER DAY

## 2023-06-03 PROCEDURE — 94669 MECHANICAL CHEST WALL OSCILL: CPT

## 2023-06-03 PROCEDURE — 1200000000 HC SEMI PRIVATE

## 2023-06-03 PROCEDURE — 6370000000 HC RX 637 (ALT 250 FOR IP): Performed by: CLINICAL NURSE SPECIALIST

## 2023-06-03 PROCEDURE — 6370000000 HC RX 637 (ALT 250 FOR IP): Performed by: INTERNAL MEDICINE

## 2023-06-03 PROCEDURE — 99232 SBSQ HOSP IP/OBS MODERATE 35: CPT | Performed by: INTERNAL MEDICINE

## 2023-06-03 PROCEDURE — 6370000000 HC RX 637 (ALT 250 FOR IP): Performed by: NURSE PRACTITIONER

## 2023-06-03 PROCEDURE — 2580000003 HC RX 258: Performed by: INTERNAL MEDICINE

## 2023-06-03 PROCEDURE — 6370000000 HC RX 637 (ALT 250 FOR IP): Performed by: HOSPITALIST

## 2023-06-03 PROCEDURE — 94761 N-INVAS EAR/PLS OXIMETRY MLT: CPT

## 2023-06-03 PROCEDURE — 36415 COLL VENOUS BLD VENIPUNCTURE: CPT

## 2023-06-03 PROCEDURE — 85610 PROTHROMBIN TIME: CPT

## 2023-06-03 PROCEDURE — 6360000002 HC RX W HCPCS: Performed by: INTERNAL MEDICINE

## 2023-06-03 RX ORDER — INSULIN GLARGINE 100 [IU]/ML
24 INJECTION, SOLUTION SUBCUTANEOUS EVERY MORNING
Status: DISCONTINUED | OUTPATIENT
Start: 2023-06-04 | End: 2023-06-04

## 2023-06-03 RX ORDER — INSULIN LISPRO 100 [IU]/ML
8 INJECTION, SOLUTION INTRAVENOUS; SUBCUTANEOUS
Status: DISCONTINUED | OUTPATIENT
Start: 2023-06-03 | End: 2023-06-04

## 2023-06-03 RX ORDER — INSULIN LISPRO 100 [IU]/ML
8 INJECTION, SOLUTION INTRAVENOUS; SUBCUTANEOUS
Status: DISCONTINUED | OUTPATIENT
Start: 2023-06-03 | End: 2023-06-03

## 2023-06-03 RX ORDER — HEPARIN SODIUM 5000 [USP'U]/ML
5000 INJECTION, SOLUTION INTRAVENOUS; SUBCUTANEOUS EVERY 8 HOURS SCHEDULED
Status: DISCONTINUED | OUTPATIENT
Start: 2023-06-03 | End: 2023-06-06 | Stop reason: ALTCHOICE

## 2023-06-03 RX ORDER — WARFARIN SODIUM 5 MG/1
5 TABLET ORAL DAILY
Status: DISCONTINUED | OUTPATIENT
Start: 2023-06-03 | End: 2023-06-05 | Stop reason: DRUGHIGH

## 2023-06-03 RX ADMIN — METOCLOPRAMIDE HYDROCHLORIDE 5 MG: 10 TABLET ORAL at 07:58

## 2023-06-03 RX ADMIN — STANDARDIZED SENNA CONCENTRATE AND DOCUSATE SODIUM 2 TABLET: 8.6; 5 TABLET ORAL at 07:59

## 2023-06-03 RX ADMIN — INSULIN LISPRO 4 UNITS: 100 INJECTION, SOLUTION INTRAVENOUS; SUBCUTANEOUS at 08:00

## 2023-06-03 RX ADMIN — INSULIN LISPRO 8 UNITS: 100 INJECTION, SOLUTION INTRAVENOUS; SUBCUTANEOUS at 17:33

## 2023-06-03 RX ADMIN — MIDODRINE HYDROCHLORIDE 5 MG: 5 TABLET ORAL at 07:58

## 2023-06-03 RX ADMIN — INSULIN LISPRO 8 UNITS: 100 INJECTION, SOLUTION INTRAVENOUS; SUBCUTANEOUS at 13:28

## 2023-06-03 RX ADMIN — IPRATROPIUM BROMIDE AND ALBUTEROL SULFATE 1 AMPULE: .5; 3 SOLUTION RESPIRATORY (INHALATION) at 21:02

## 2023-06-03 RX ADMIN — INSULIN LISPRO 8 UNITS: 100 INJECTION, SOLUTION INTRAVENOUS; SUBCUTANEOUS at 13:20

## 2023-06-03 RX ADMIN — Medication 5 ML: at 22:02

## 2023-06-03 RX ADMIN — POLYETHYLENE GLYCOL 3350 17 G: 17 POWDER, FOR SOLUTION ORAL at 07:59

## 2023-06-03 RX ADMIN — CALCITRIOL CAPSULES 0.25 MCG 0.25 MCG: 0.25 CAPSULE ORAL at 17:30

## 2023-06-03 RX ADMIN — BENZONATATE 200 MG: 100 CAPSULE ORAL at 15:52

## 2023-06-03 RX ADMIN — Medication 10 ML: at 22:03

## 2023-06-03 RX ADMIN — INSULIN GLARGINE 18 UNITS: 100 INJECTION, SOLUTION SUBCUTANEOUS at 08:00

## 2023-06-03 RX ADMIN — METHYLPREDNISOLONE SODIUM SUCCINATE 40 MG: 40 INJECTION INTRAMUSCULAR; INTRAVENOUS at 06:17

## 2023-06-03 RX ADMIN — Medication 10 ML: at 07:59

## 2023-06-03 RX ADMIN — Medication 5 ML: at 18:17

## 2023-06-03 RX ADMIN — BENZONATATE 200 MG: 100 CAPSULE ORAL at 08:15

## 2023-06-03 RX ADMIN — HEPARIN SODIUM 5000 UNITS: 5000 INJECTION INTRAVENOUS; SUBCUTANEOUS at 13:20

## 2023-06-03 RX ADMIN — METHYLPREDNISOLONE SODIUM SUCCINATE 40 MG: 40 INJECTION INTRAMUSCULAR; INTRAVENOUS at 13:19

## 2023-06-03 RX ADMIN — LEVOTHYROXINE SODIUM 88 MCG: 0.09 TABLET ORAL at 06:17

## 2023-06-03 RX ADMIN — AMIODARONE HYDROCHLORIDE 100 MG: 200 TABLET ORAL at 07:59

## 2023-06-03 RX ADMIN — MIDODRINE HYDROCHLORIDE 5 MG: 5 TABLET ORAL at 17:31

## 2023-06-03 RX ADMIN — IPRATROPIUM BROMIDE AND ALBUTEROL SULFATE 1 AMPULE: .5; 3 SOLUTION RESPIRATORY (INHALATION) at 09:41

## 2023-06-03 RX ADMIN — IPRATROPIUM BROMIDE AND ALBUTEROL SULFATE 1 AMPULE: .5; 3 SOLUTION RESPIRATORY (INHALATION) at 13:13

## 2023-06-03 RX ADMIN — Medication 5 ML: at 13:52

## 2023-06-03 RX ADMIN — MIDODRINE HYDROCHLORIDE 5 MG: 5 TABLET ORAL at 13:52

## 2023-06-03 RX ADMIN — HEPARIN SODIUM 5000 UNITS: 5000 INJECTION INTRAVENOUS; SUBCUTANEOUS at 22:03

## 2023-06-03 RX ADMIN — WARFARIN SODIUM 5 MG: 5 TABLET ORAL at 17:30

## 2023-06-03 RX ADMIN — PIPERACILLIN AND TAZOBACTAM 3375 MG: 3; .375 INJECTION, POWDER, LYOPHILIZED, FOR SOLUTION INTRAVENOUS at 00:06

## 2023-06-03 RX ADMIN — OXYCODONE 5 MG: 5 TABLET ORAL at 08:15

## 2023-06-03 RX ADMIN — METOCLOPRAMIDE HYDROCHLORIDE 5 MG: 10 TABLET ORAL at 17:31

## 2023-06-03 RX ADMIN — ASPIRIN 81 MG: 81 TABLET, CHEWABLE ORAL at 07:58

## 2023-06-03 RX ADMIN — MELATONIN TAB 3 MG 3 MG: 3 TAB at 22:02

## 2023-06-03 RX ADMIN — PIPERACILLIN AND TAZOBACTAM 3375 MG: 3; .375 INJECTION, POWDER, LYOPHILIZED, FOR SOLUTION INTRAVENOUS at 13:38

## 2023-06-03 RX ADMIN — METOPROLOL SUCCINATE 50 MG: 50 TABLET, EXTENDED RELEASE ORAL at 09:29

## 2023-06-03 RX ADMIN — METHYLPREDNISOLONE SODIUM SUCCINATE 40 MG: 40 INJECTION INTRAMUSCULAR; INTRAVENOUS at 18:17

## 2023-06-03 RX ADMIN — Medication 5 ML: at 09:32

## 2023-06-03 RX ADMIN — INSULIN LISPRO 4 UNITS: 100 INJECTION, SOLUTION INTRAVENOUS; SUBCUTANEOUS at 17:34

## 2023-06-03 ASSESSMENT — PAIN DESCRIPTION - ONSET
ONSET: ON-GOING

## 2023-06-03 ASSESSMENT — PAIN DESCRIPTION - LOCATION
LOCATION: CHEST

## 2023-06-03 ASSESSMENT — PAIN DESCRIPTION - FREQUENCY
FREQUENCY: CONTINUOUS

## 2023-06-03 ASSESSMENT — PAIN DESCRIPTION - PAIN TYPE
TYPE: CHRONIC PAIN
TYPE: CHRONIC PAIN;ACUTE PAIN
TYPE: CHRONIC PAIN

## 2023-06-03 ASSESSMENT — PAIN DESCRIPTION - ORIENTATION
ORIENTATION: RIGHT;LEFT;MID

## 2023-06-03 ASSESSMENT — PAIN - FUNCTIONAL ASSESSMENT
PAIN_FUNCTIONAL_ASSESSMENT: ACTIVITIES ARE NOT PREVENTED
PAIN_FUNCTIONAL_ASSESSMENT: ACTIVITIES ARE NOT PREVENTED
PAIN_FUNCTIONAL_ASSESSMENT: PREVENTS OR INTERFERES SOME ACTIVE ACTIVITIES AND ADLS

## 2023-06-03 ASSESSMENT — PAIN SCALES - GENERAL
PAINLEVEL_OUTOF10: 7
PAINLEVEL_OUTOF10: 5
PAINLEVEL_OUTOF10: 0
PAINLEVEL_OUTOF10: 6
PAINLEVEL_OUTOF10: 6

## 2023-06-03 ASSESSMENT — PAIN DESCRIPTION - DESCRIPTORS
DESCRIPTORS: ACHING

## 2023-06-04 LAB
ANION GAP SERPL CALCULATED.3IONS-SCNC: 10 MMOL/L (ref 3–16)
BASOPHILS # BLD: 0 K/UL (ref 0–0.2)
BASOPHILS NFR BLD: 0.1 %
BUN SERPL-MCNC: 75 MG/DL (ref 7–20)
CALCIUM SERPL-MCNC: 9 MG/DL (ref 8.3–10.6)
CHLORIDE SERPL-SCNC: 89 MMOL/L (ref 99–110)
CO2 SERPL-SCNC: 31 MMOL/L (ref 21–32)
CREAT SERPL-MCNC: 2.4 MG/DL (ref 0.6–1.2)
DEPRECATED RDW RBC AUTO: 17.8 % (ref 12.4–15.4)
EOSINOPHIL # BLD: 0 K/UL (ref 0–0.6)
EOSINOPHIL NFR BLD: 0.1 %
GFR SERPLBLD CREATININE-BSD FMLA CKD-EPI: 20 ML/MIN/{1.73_M2}
GLUCOSE BLD-MCNC: 298 MG/DL (ref 70–99)
GLUCOSE BLD-MCNC: 306 MG/DL (ref 70–99)
GLUCOSE BLD-MCNC: 337 MG/DL (ref 70–99)
GLUCOSE BLD-MCNC: 362 MG/DL (ref 70–99)
GLUCOSE SERPL-MCNC: 317 MG/DL (ref 70–99)
HCT VFR BLD AUTO: 30.4 % (ref 36–48)
HGB BLD-MCNC: 9.7 G/DL (ref 12–16)
INR PPP: 1.22 (ref 0.84–1.16)
LYMPHOCYTES # BLD: 0.3 K/UL (ref 1–5.1)
LYMPHOCYTES NFR BLD: 3.2 %
MAGNESIUM SERPL-MCNC: 2.8 MG/DL (ref 1.8–2.4)
MCH RBC QN AUTO: 29.2 PG (ref 26–34)
MCHC RBC AUTO-ENTMCNC: 31.9 G/DL (ref 31–36)
MCV RBC AUTO: 91.4 FL (ref 80–100)
MONOCYTES # BLD: 0.2 K/UL (ref 0–1.3)
MONOCYTES NFR BLD: 2.3 %
NEUTROPHILS # BLD: 8.3 K/UL (ref 1.7–7.7)
NEUTROPHILS NFR BLD: 94.3 %
PERFORMED ON: ABNORMAL
PLATELET # BLD AUTO: 205 K/UL (ref 135–450)
PMV BLD AUTO: 8.5 FL (ref 5–10.5)
POTASSIUM SERPL-SCNC: 3.4 MMOL/L (ref 3.5–5.1)
PROTHROMBIN TIME: 15.4 SEC (ref 11.5–14.8)
RBC # BLD AUTO: 3.33 M/UL (ref 4–5.2)
SODIUM SERPL-SCNC: 130 MMOL/L (ref 136–145)
TROPONIN, HIGH SENSITIVITY: 25 NG/L (ref 0–14)
WBC # BLD AUTO: 8.8 K/UL (ref 4–11)

## 2023-06-04 PROCEDURE — 1200000000 HC SEMI PRIVATE

## 2023-06-04 PROCEDURE — 0202U NFCT DS 22 TRGT SARS-COV-2: CPT

## 2023-06-04 PROCEDURE — 85025 COMPLETE CBC W/AUTO DIFF WBC: CPT

## 2023-06-04 PROCEDURE — 84484 ASSAY OF TROPONIN QUANT: CPT

## 2023-06-04 PROCEDURE — 93005 ELECTROCARDIOGRAM TRACING: CPT | Performed by: NURSE PRACTITIONER

## 2023-06-04 PROCEDURE — 6370000000 HC RX 637 (ALT 250 FOR IP): Performed by: NURSE PRACTITIONER

## 2023-06-04 PROCEDURE — 6370000000 HC RX 637 (ALT 250 FOR IP)

## 2023-06-04 PROCEDURE — 94640 AIRWAY INHALATION TREATMENT: CPT

## 2023-06-04 PROCEDURE — 6360000002 HC RX W HCPCS: Performed by: INTERNAL MEDICINE

## 2023-06-04 PROCEDURE — 6370000000 HC RX 637 (ALT 250 FOR IP): Performed by: INTERNAL MEDICINE

## 2023-06-04 PROCEDURE — 2580000003 HC RX 258: Performed by: FAMILY MEDICINE

## 2023-06-04 PROCEDURE — 85610 PROTHROMBIN TIME: CPT

## 2023-06-04 PROCEDURE — 2700000000 HC OXYGEN THERAPY PER DAY

## 2023-06-04 PROCEDURE — 36415 COLL VENOUS BLD VENIPUNCTURE: CPT

## 2023-06-04 PROCEDURE — 2580000003 HC RX 258: Performed by: INTERNAL MEDICINE

## 2023-06-04 PROCEDURE — 6370000000 HC RX 637 (ALT 250 FOR IP): Performed by: HOSPITALIST

## 2023-06-04 PROCEDURE — 2580000003 HC RX 258: Performed by: HOSPITALIST

## 2023-06-04 PROCEDURE — 6370000000 HC RX 637 (ALT 250 FOR IP): Performed by: CLINICAL NURSE SPECIALIST

## 2023-06-04 PROCEDURE — 80048 BASIC METABOLIC PNL TOTAL CA: CPT

## 2023-06-04 PROCEDURE — 83735 ASSAY OF MAGNESIUM: CPT

## 2023-06-04 PROCEDURE — 94669 MECHANICAL CHEST WALL OSCILL: CPT

## 2023-06-04 PROCEDURE — 6370000000 HC RX 637 (ALT 250 FOR IP): Performed by: PHYSICIAN ASSISTANT

## 2023-06-04 PROCEDURE — 99233 SBSQ HOSP IP/OBS HIGH 50: CPT | Performed by: NURSE PRACTITIONER

## 2023-06-04 PROCEDURE — 6370000000 HC RX 637 (ALT 250 FOR IP): Performed by: FAMILY MEDICINE

## 2023-06-04 PROCEDURE — 99232 SBSQ HOSP IP/OBS MODERATE 35: CPT | Performed by: INTERNAL MEDICINE

## 2023-06-04 PROCEDURE — 94761 N-INVAS EAR/PLS OXIMETRY MLT: CPT

## 2023-06-04 PROCEDURE — 2580000003 HC RX 258

## 2023-06-04 PROCEDURE — 6360000002 HC RX W HCPCS: Performed by: PHYSICIAN ASSISTANT

## 2023-06-04 RX ORDER — INSULIN LISPRO 100 [IU]/ML
0-4 INJECTION, SOLUTION INTRAVENOUS; SUBCUTANEOUS NIGHTLY
Status: DISCONTINUED | OUTPATIENT
Start: 2023-06-04 | End: 2023-06-08 | Stop reason: HOSPADM

## 2023-06-04 RX ORDER — WATER 1000 ML/1000ML
INJECTION, SOLUTION INTRAVENOUS
Status: COMPLETED
Start: 2023-06-04 | End: 2023-06-04

## 2023-06-04 RX ORDER — INSULIN LISPRO 100 [IU]/ML
11 INJECTION, SOLUTION INTRAVENOUS; SUBCUTANEOUS
Status: DISCONTINUED | OUTPATIENT
Start: 2023-06-04 | End: 2023-06-07

## 2023-06-04 RX ORDER — NITROGLYCERIN 0.4 MG/1
TABLET SUBLINGUAL
Status: COMPLETED
Start: 2023-06-04 | End: 2023-06-04

## 2023-06-04 RX ORDER — INSULIN GLARGINE 100 [IU]/ML
33 INJECTION, SOLUTION SUBCUTANEOUS EVERY MORNING
Status: DISCONTINUED | OUTPATIENT
Start: 2023-06-04 | End: 2023-06-07

## 2023-06-04 RX ORDER — NITROGLYCERIN 0.4 MG/1
0.4 TABLET SUBLINGUAL EVERY 5 MIN PRN
Status: DISCONTINUED | OUTPATIENT
Start: 2023-06-04 | End: 2023-06-08 | Stop reason: HOSPADM

## 2023-06-04 RX ORDER — GUAIFENESIN/DEXTROMETHORPHAN 100-10MG/5
5 SYRUP ORAL EVERY 4 HOURS PRN
Status: CANCELLED | OUTPATIENT
Start: 2023-06-04

## 2023-06-04 RX ORDER — INSULIN LISPRO 100 [IU]/ML
0-8 INJECTION, SOLUTION INTRAVENOUS; SUBCUTANEOUS
Status: DISCONTINUED | OUTPATIENT
Start: 2023-06-05 | End: 2023-06-08 | Stop reason: HOSPADM

## 2023-06-04 RX ORDER — TORSEMIDE 20 MG/1
40 TABLET ORAL DAILY
Status: DISCONTINUED | OUTPATIENT
Start: 2023-06-04 | End: 2023-06-06

## 2023-06-04 RX ADMIN — BENZONATATE 200 MG: 100 CAPSULE ORAL at 12:48

## 2023-06-04 RX ADMIN — Medication 10 ML: at 08:09

## 2023-06-04 RX ADMIN — HEPARIN SODIUM 5000 UNITS: 5000 INJECTION INTRAVENOUS; SUBCUTANEOUS at 06:29

## 2023-06-04 RX ADMIN — MELATONIN TAB 3 MG 3 MG: 3 TAB at 21:03

## 2023-06-04 RX ADMIN — MIDODRINE HYDROCHLORIDE 5 MG: 5 TABLET ORAL at 12:25

## 2023-06-04 RX ADMIN — INSULIN GLARGINE 33 UNITS: 100 INJECTION, SOLUTION SUBCUTANEOUS at 08:22

## 2023-06-04 RX ADMIN — INSULIN LISPRO 8 UNITS: 100 INJECTION, SOLUTION INTRAVENOUS; SUBCUTANEOUS at 08:12

## 2023-06-04 RX ADMIN — IPRATROPIUM BROMIDE AND ALBUTEROL SULFATE 1 AMPULE: .5; 3 SOLUTION RESPIRATORY (INHALATION) at 09:00

## 2023-06-04 RX ADMIN — INSULIN LISPRO 4 UNITS: 100 INJECTION, SOLUTION INTRAVENOUS; SUBCUTANEOUS at 21:33

## 2023-06-04 RX ADMIN — NITROGLYCERIN 0.4 MG: 0.4 TABLET, ORALLY DISINTEGRATING SUBLINGUAL at 10:37

## 2023-06-04 RX ADMIN — TORSEMIDE 40 MG: 20 TABLET ORAL at 15:32

## 2023-06-04 RX ADMIN — METHYLPREDNISOLONE SODIUM SUCCINATE 40 MG: 40 INJECTION INTRAMUSCULAR; INTRAVENOUS at 12:25

## 2023-06-04 RX ADMIN — IPRATROPIUM BROMIDE AND ALBUTEROL SULFATE 1 AMPULE: .5; 3 SOLUTION RESPIRATORY (INHALATION) at 20:23

## 2023-06-04 RX ADMIN — METOCLOPRAMIDE HYDROCHLORIDE 5 MG: 10 TABLET ORAL at 18:03

## 2023-06-04 RX ADMIN — NITROGLYCERIN 0.4 MG: 0.4 TABLET, ORALLY DISINTEGRATING SUBLINGUAL at 10:50

## 2023-06-04 RX ADMIN — CALCITRIOL CAPSULES 0.25 MCG 0.25 MCG: 0.25 CAPSULE ORAL at 18:03

## 2023-06-04 RX ADMIN — POTASSIUM CHLORIDE 40 MEQ: 1500 TABLET, EXTENDED RELEASE ORAL at 06:24

## 2023-06-04 RX ADMIN — BENZONATATE 200 MG: 100 CAPSULE ORAL at 21:03

## 2023-06-04 RX ADMIN — WATER 10 ML: 1 INJECTION INTRAMUSCULAR; INTRAVENOUS; SUBCUTANEOUS at 21:05

## 2023-06-04 RX ADMIN — INSULIN LISPRO 6 UNITS: 100 INJECTION, SOLUTION INTRAVENOUS; SUBCUTANEOUS at 08:12

## 2023-06-04 RX ADMIN — HEPARIN SODIUM 5000 UNITS: 5000 INJECTION INTRAVENOUS; SUBCUTANEOUS at 21:04

## 2023-06-04 RX ADMIN — METHYLPREDNISOLONE SODIUM SUCCINATE 40 MG: 40 INJECTION INTRAMUSCULAR; INTRAVENOUS at 00:45

## 2023-06-04 RX ADMIN — OXYCODONE 5 MG: 5 TABLET ORAL at 08:22

## 2023-06-04 RX ADMIN — LEVOTHYROXINE SODIUM 88 MCG: 0.09 TABLET ORAL at 06:24

## 2023-06-04 RX ADMIN — WATER: 1 INJECTION INTRAMUSCULAR; INTRAVENOUS; SUBCUTANEOUS at 06:33

## 2023-06-04 RX ADMIN — INSULIN LISPRO 11 UNITS: 100 INJECTION, SOLUTION INTRAVENOUS; SUBCUTANEOUS at 12:26

## 2023-06-04 RX ADMIN — IPRATROPIUM BROMIDE AND ALBUTEROL SULFATE 1 AMPULE: .5; 3 SOLUTION RESPIRATORY (INHALATION) at 14:09

## 2023-06-04 RX ADMIN — PIPERACILLIN AND TAZOBACTAM 3375 MG: 3; .375 INJECTION, POWDER, LYOPHILIZED, FOR SOLUTION INTRAVENOUS at 00:51

## 2023-06-04 RX ADMIN — WATER: 1 INJECTION INTRAMUSCULAR; INTRAVENOUS; SUBCUTANEOUS at 00:45

## 2023-06-04 RX ADMIN — Medication 10 ML: at 21:04

## 2023-06-04 RX ADMIN — METHYLPREDNISOLONE SODIUM SUCCINATE 40 MG: 40 INJECTION INTRAMUSCULAR; INTRAVENOUS at 06:27

## 2023-06-04 RX ADMIN — MIDODRINE HYDROCHLORIDE 5 MG: 5 TABLET ORAL at 18:03

## 2023-06-04 RX ADMIN — ASPIRIN 81 MG: 81 TABLET, CHEWABLE ORAL at 08:08

## 2023-06-04 RX ADMIN — STANDARDIZED SENNA CONCENTRATE AND DOCUSATE SODIUM 2 TABLET: 8.6; 5 TABLET ORAL at 08:07

## 2023-06-04 RX ADMIN — INSULIN LISPRO 11 UNITS: 100 INJECTION, SOLUTION INTRAVENOUS; SUBCUTANEOUS at 18:03

## 2023-06-04 RX ADMIN — HEPARIN SODIUM 5000 UNITS: 5000 INJECTION INTRAVENOUS; SUBCUTANEOUS at 15:33

## 2023-06-04 RX ADMIN — METOPROLOL SUCCINATE 50 MG: 50 TABLET, EXTENDED RELEASE ORAL at 08:08

## 2023-06-04 RX ADMIN — PIPERACILLIN AND TAZOBACTAM 3375 MG: 3; .375 INJECTION, POWDER, LYOPHILIZED, FOR SOLUTION INTRAVENOUS at 12:53

## 2023-06-04 RX ADMIN — METOCLOPRAMIDE HYDROCHLORIDE 5 MG: 10 TABLET ORAL at 08:08

## 2023-06-04 RX ADMIN — WATER 1 ML: 1 INJECTION INTRAMUSCULAR; INTRAVENOUS; SUBCUTANEOUS at 12:36

## 2023-06-04 RX ADMIN — WARFARIN SODIUM 5 MG: 5 TABLET ORAL at 18:03

## 2023-06-04 RX ADMIN — MIDODRINE HYDROCHLORIDE 5 MG: 5 TABLET ORAL at 08:08

## 2023-06-04 RX ADMIN — Medication 5 ML: at 21:02

## 2023-06-04 RX ADMIN — AMIODARONE HYDROCHLORIDE 100 MG: 200 TABLET ORAL at 08:08

## 2023-06-04 RX ADMIN — METHYLPREDNISOLONE SODIUM SUCCINATE 40 MG: 40 INJECTION INTRAMUSCULAR; INTRAVENOUS at 21:05

## 2023-06-04 RX ADMIN — Medication 10 ML: at 08:07

## 2023-06-04 RX ADMIN — POLYETHYLENE GLYCOL 3350 17 G: 17 POWDER, FOR SOLUTION ORAL at 08:07

## 2023-06-04 ASSESSMENT — PAIN DESCRIPTION - LOCATION
LOCATION: CHEST

## 2023-06-04 ASSESSMENT — PAIN DESCRIPTION - ORIENTATION
ORIENTATION: MID

## 2023-06-04 ASSESSMENT — PAIN DESCRIPTION - DESCRIPTORS
DESCRIPTORS: SHARP

## 2023-06-04 ASSESSMENT — PAIN SCALES - GENERAL
PAINLEVEL_OUTOF10: 7
PAINLEVEL_OUTOF10: 8
PAINLEVEL_OUTOF10: 0
PAINLEVEL_OUTOF10: 4
PAINLEVEL_OUTOF10: 6
PAINLEVEL_OUTOF10: 8
PAINLEVEL_OUTOF10: 7
PAINLEVEL_OUTOF10: 0
PAINLEVEL_OUTOF10: 6
PAINLEVEL_OUTOF10: 7
PAINLEVEL_OUTOF10: 0

## 2023-06-05 ENCOUNTER — APPOINTMENT (OUTPATIENT)
Dept: GENERAL RADIOLOGY | Age: 78
DRG: 177 | End: 2023-06-05
Payer: MEDICARE

## 2023-06-05 LAB
ANION GAP SERPL CALCULATED.3IONS-SCNC: 12 MMOL/L (ref 3–16)
BASOPHILS # BLD: 0 K/UL (ref 0–0.2)
BASOPHILS NFR BLD: 0 %
BUN SERPL-MCNC: 80 MG/DL (ref 7–20)
CALCIUM SERPL-MCNC: 8.4 MG/DL (ref 8.3–10.6)
CHLORIDE SERPL-SCNC: 97 MMOL/L (ref 99–110)
CO2 SERPL-SCNC: 28 MMOL/L (ref 21–32)
CREAT SERPL-MCNC: 2.3 MG/DL (ref 0.6–1.2)
DEPRECATED RDW RBC AUTO: 18.2 % (ref 12.4–15.4)
EKG ATRIAL RATE: 111 BPM
EKG DIAGNOSIS: NORMAL
EKG Q-T INTERVAL: 428 MS
EKG QRS DURATION: 102 MS
EKG QTC CALCULATION (BAZETT): 497 MS
EKG R AXIS: 56 DEGREES
EKG T AXIS: 117 DEGREES
EKG VENTRICULAR RATE: 81 BPM
EOSINOPHIL # BLD: 0 K/UL (ref 0–0.6)
EOSINOPHIL NFR BLD: 0 %
GFR SERPLBLD CREATININE-BSD FMLA CKD-EPI: 21 ML/MIN/{1.73_M2}
GLUCOSE BLD-MCNC: 127 MG/DL (ref 70–99)
GLUCOSE BLD-MCNC: 208 MG/DL (ref 70–99)
GLUCOSE BLD-MCNC: 227 MG/DL (ref 70–99)
GLUCOSE BLD-MCNC: 286 MG/DL (ref 70–99)
GLUCOSE SERPL-MCNC: 194 MG/DL (ref 70–99)
HCT VFR BLD AUTO: 31.1 % (ref 36–48)
HGB BLD-MCNC: 9.8 G/DL (ref 12–16)
INR PPP: 1.43 (ref 0.84–1.16)
LYMPHOCYTES # BLD: 0.3 K/UL (ref 1–5.1)
LYMPHOCYTES NFR BLD: 3.5 %
MCH RBC QN AUTO: 29.1 PG (ref 26–34)
MCHC RBC AUTO-ENTMCNC: 31.6 G/DL (ref 31–36)
MCV RBC AUTO: 92 FL (ref 80–100)
MONOCYTES # BLD: 0.3 K/UL (ref 0–1.3)
MONOCYTES NFR BLD: 3.9 %
NEUTROPHILS # BLD: 6.7 K/UL (ref 1.7–7.7)
NEUTROPHILS NFR BLD: 92.6 %
PERFORMED ON: ABNORMAL
PLATELET # BLD AUTO: 221 K/UL (ref 135–450)
PMV BLD AUTO: 8.6 FL (ref 5–10.5)
POTASSIUM SERPL-SCNC: 3.8 MMOL/L (ref 3.5–5.1)
PROTHROMBIN TIME: 17.4 SEC (ref 11.5–14.8)
RBC # BLD AUTO: 3.38 M/UL (ref 4–5.2)
REPORT: NORMAL
RESP PATH DNA+RNA PNL NPH NAA+NON-PROBE: NORMAL
SODIUM SERPL-SCNC: 137 MMOL/L (ref 136–145)
WBC # BLD AUTO: 7.2 K/UL (ref 4–11)

## 2023-06-05 PROCEDURE — 2580000003 HC RX 258: Performed by: INTERNAL MEDICINE

## 2023-06-05 PROCEDURE — 6360000002 HC RX W HCPCS: Performed by: INTERNAL MEDICINE

## 2023-06-05 PROCEDURE — 2580000003 HC RX 258: Performed by: HOSPITALIST

## 2023-06-05 PROCEDURE — 6370000000 HC RX 637 (ALT 250 FOR IP): Performed by: HOSPITALIST

## 2023-06-05 PROCEDURE — 6370000000 HC RX 637 (ALT 250 FOR IP): Performed by: NURSE PRACTITIONER

## 2023-06-05 PROCEDURE — 1200000000 HC SEMI PRIVATE

## 2023-06-05 PROCEDURE — 92611 MOTION FLUOROSCOPY/SWALLOW: CPT

## 2023-06-05 PROCEDURE — 36415 COLL VENOUS BLD VENIPUNCTURE: CPT

## 2023-06-05 PROCEDURE — 80048 BASIC METABOLIC PNL TOTAL CA: CPT

## 2023-06-05 PROCEDURE — 6370000000 HC RX 637 (ALT 250 FOR IP): Performed by: CLINICAL NURSE SPECIALIST

## 2023-06-05 PROCEDURE — 94640 AIRWAY INHALATION TREATMENT: CPT

## 2023-06-05 PROCEDURE — 99232 SBSQ HOSP IP/OBS MODERATE 35: CPT | Performed by: INTERNAL MEDICINE

## 2023-06-05 PROCEDURE — 6360000002 HC RX W HCPCS: Performed by: PHYSICIAN ASSISTANT

## 2023-06-05 PROCEDURE — 2700000000 HC OXYGEN THERAPY PER DAY

## 2023-06-05 PROCEDURE — 6370000000 HC RX 637 (ALT 250 FOR IP): Performed by: FAMILY MEDICINE

## 2023-06-05 PROCEDURE — 92526 ORAL FUNCTION THERAPY: CPT

## 2023-06-05 PROCEDURE — 94669 MECHANICAL CHEST WALL OSCILL: CPT

## 2023-06-05 PROCEDURE — 2580000003 HC RX 258

## 2023-06-05 PROCEDURE — 94761 N-INVAS EAR/PLS OXIMETRY MLT: CPT

## 2023-06-05 PROCEDURE — 2580000003 HC RX 258: Performed by: FAMILY MEDICINE

## 2023-06-05 PROCEDURE — 99232 SBSQ HOSP IP/OBS MODERATE 35: CPT | Performed by: CLINICAL NURSE SPECIALIST

## 2023-06-05 PROCEDURE — 74230 X-RAY XM SWLNG FUNCJ C+: CPT

## 2023-06-05 PROCEDURE — 6370000000 HC RX 637 (ALT 250 FOR IP): Performed by: PHYSICIAN ASSISTANT

## 2023-06-05 PROCEDURE — 6370000000 HC RX 637 (ALT 250 FOR IP): Performed by: INTERNAL MEDICINE

## 2023-06-05 PROCEDURE — 85025 COMPLETE CBC W/AUTO DIFF WBC: CPT

## 2023-06-05 PROCEDURE — 85610 PROTHROMBIN TIME: CPT

## 2023-06-05 PROCEDURE — 93010 ELECTROCARDIOGRAM REPORT: CPT | Performed by: INTERNAL MEDICINE

## 2023-06-05 RX ORDER — WATER 1000 ML/1000ML
INJECTION, SOLUTION INTRAVENOUS
Status: COMPLETED
Start: 2023-06-05 | End: 2023-06-05

## 2023-06-05 RX ORDER — WARFARIN SODIUM 5 MG/1
5 TABLET ORAL
Status: DISCONTINUED | OUTPATIENT
Start: 2023-06-06 | End: 2023-06-08

## 2023-06-05 RX ORDER — WARFARIN SODIUM 2.5 MG/1
2.5 TABLET ORAL
Status: DISCONTINUED | OUTPATIENT
Start: 2023-06-05 | End: 2023-06-08

## 2023-06-05 RX ADMIN — Medication 10 ML: at 21:25

## 2023-06-05 RX ADMIN — METOPROLOL SUCCINATE 50 MG: 50 TABLET, EXTENDED RELEASE ORAL at 09:28

## 2023-06-05 RX ADMIN — ASPIRIN 81 MG: 81 TABLET, CHEWABLE ORAL at 09:28

## 2023-06-05 RX ADMIN — STANDARDIZED SENNA CONCENTRATE AND DOCUSATE SODIUM 2 TABLET: 8.6; 5 TABLET ORAL at 09:27

## 2023-06-05 RX ADMIN — POLYETHYLENE GLYCOL 3350 17 G: 17 POWDER, FOR SOLUTION ORAL at 09:26

## 2023-06-05 RX ADMIN — HEPARIN SODIUM 5000 UNITS: 5000 INJECTION INTRAVENOUS; SUBCUTANEOUS at 13:55

## 2023-06-05 RX ADMIN — CALCITRIOL CAPSULES 0.25 MCG 0.25 MCG: 0.25 CAPSULE ORAL at 17:04

## 2023-06-05 RX ADMIN — METOCLOPRAMIDE HYDROCHLORIDE 5 MG: 10 TABLET ORAL at 09:27

## 2023-06-05 RX ADMIN — MELATONIN TAB 3 MG 3 MG: 3 TAB at 21:25

## 2023-06-05 RX ADMIN — IPRATROPIUM BROMIDE AND ALBUTEROL SULFATE 1 AMPULE: .5; 3 SOLUTION RESPIRATORY (INHALATION) at 14:40

## 2023-06-05 RX ADMIN — AMIODARONE HYDROCHLORIDE 100 MG: 200 TABLET ORAL at 09:28

## 2023-06-05 RX ADMIN — MIDODRINE HYDROCHLORIDE 5 MG: 5 TABLET ORAL at 16:39

## 2023-06-05 RX ADMIN — WARFARIN SODIUM 2.5 MG: 2.5 TABLET ORAL at 18:10

## 2023-06-05 RX ADMIN — METHYLPREDNISOLONE SODIUM SUCCINATE 40 MG: 40 INJECTION INTRAMUSCULAR; INTRAVENOUS at 09:32

## 2023-06-05 RX ADMIN — MIDODRINE HYDROCHLORIDE 5 MG: 5 TABLET ORAL at 12:44

## 2023-06-05 RX ADMIN — INSULIN LISPRO 11 UNITS: 100 INJECTION, SOLUTION INTRAVENOUS; SUBCUTANEOUS at 18:13

## 2023-06-05 RX ADMIN — Medication 10 ML: at 09:37

## 2023-06-05 RX ADMIN — WATER 10 ML: 1 INJECTION INTRAMUSCULAR; INTRAVENOUS; SUBCUTANEOUS at 04:42

## 2023-06-05 RX ADMIN — Medication 5 ML: at 09:51

## 2023-06-05 RX ADMIN — HEPARIN SODIUM 5000 UNITS: 5000 INJECTION INTRAVENOUS; SUBCUTANEOUS at 21:26

## 2023-06-05 RX ADMIN — INSULIN LISPRO 4 UNITS: 100 INJECTION, SOLUTION INTRAVENOUS; SUBCUTANEOUS at 12:44

## 2023-06-05 RX ADMIN — MIDODRINE HYDROCHLORIDE 5 MG: 5 TABLET ORAL at 09:27

## 2023-06-05 RX ADMIN — PIPERACILLIN AND TAZOBACTAM 3375 MG: 3; .375 INJECTION, POWDER, LYOPHILIZED, FOR SOLUTION INTRAVENOUS at 09:47

## 2023-06-05 RX ADMIN — DIPHENHYDRAMINE HYDROCHLORIDE 25 MG: 25 TABLET ORAL at 21:26

## 2023-06-05 RX ADMIN — Medication 5 ML: at 21:36

## 2023-06-05 RX ADMIN — LEVOTHYROXINE SODIUM 88 MCG: 0.09 TABLET ORAL at 06:29

## 2023-06-05 RX ADMIN — SODIUM CHLORIDE 10 ML/HR: 9 INJECTION, SOLUTION INTRAVENOUS at 16:55

## 2023-06-05 RX ADMIN — IPRATROPIUM BROMIDE AND ALBUTEROL SULFATE 1 AMPULE: .5; 3 SOLUTION RESPIRATORY (INHALATION) at 07:14

## 2023-06-05 RX ADMIN — PIPERACILLIN AND TAZOBACTAM 3375 MG: 3; .375 INJECTION, POWDER, LYOPHILIZED, FOR SOLUTION INTRAVENOUS at 16:56

## 2023-06-05 RX ADMIN — INSULIN LISPRO 2 UNITS: 100 INJECTION, SOLUTION INTRAVENOUS; SUBCUTANEOUS at 09:37

## 2023-06-05 RX ADMIN — IPRATROPIUM BROMIDE AND ALBUTEROL SULFATE 1 AMPULE: .5; 3 SOLUTION RESPIRATORY (INHALATION) at 20:30

## 2023-06-05 RX ADMIN — WATER 1 ML: 1 INJECTION INTRAMUSCULAR; INTRAVENOUS; SUBCUTANEOUS at 21:26

## 2023-06-05 RX ADMIN — METOCLOPRAMIDE HYDROCHLORIDE 5 MG: 10 TABLET ORAL at 16:39

## 2023-06-05 RX ADMIN — INSULIN LISPRO 11 UNITS: 100 INJECTION, SOLUTION INTRAVENOUS; SUBCUTANEOUS at 09:37

## 2023-06-05 RX ADMIN — METHYLPREDNISOLONE SODIUM SUCCINATE 40 MG: 40 INJECTION INTRAMUSCULAR; INTRAVENOUS at 04:42

## 2023-06-05 RX ADMIN — TORSEMIDE 40 MG: 20 TABLET ORAL at 09:28

## 2023-06-05 RX ADMIN — PIPERACILLIN AND TAZOBACTAM 3375 MG: 3; .375 INJECTION, POWDER, LYOPHILIZED, FOR SOLUTION INTRAVENOUS at 00:47

## 2023-06-05 RX ADMIN — INSULIN LISPRO 11 UNITS: 100 INJECTION, SOLUTION INTRAVENOUS; SUBCUTANEOUS at 12:44

## 2023-06-05 RX ADMIN — INSULIN LISPRO 2 UNITS: 100 INJECTION, SOLUTION INTRAVENOUS; SUBCUTANEOUS at 18:13

## 2023-06-05 RX ADMIN — INSULIN GLARGINE 33 UNITS: 100 INJECTION, SOLUTION SUBCUTANEOUS at 09:36

## 2023-06-05 RX ADMIN — METHYLPREDNISOLONE SODIUM SUCCINATE 40 MG: 40 INJECTION INTRAMUSCULAR; INTRAVENOUS at 21:26

## 2023-06-05 RX ADMIN — HEPARIN SODIUM 5000 UNITS: 5000 INJECTION INTRAVENOUS; SUBCUTANEOUS at 06:29

## 2023-06-05 RX ADMIN — METHYLPREDNISOLONE SODIUM SUCCINATE 40 MG: 40 INJECTION INTRAMUSCULAR; INTRAVENOUS at 16:39

## 2023-06-05 ASSESSMENT — PAIN SCALES - GENERAL
PAINLEVEL_OUTOF10: 0

## 2023-06-05 NOTE — RT PROTOCOL NOTE
RT Inhaler-Nebulizer Bronchodilator Protocol Note    There is a bronchodilator order in the chart from a provider indicating to follow the RT Bronchodilator Protocol and there is an Initiate RT Inhaler-Nebulizer Bronchodilator Protocol order as well (see protocol at bottom of note). CXR Findings:  No results found. The findings from the last RT Protocol Assessment were as follows:   History Pulmonary Disease: Smoker 15 pack years or more  Respiratory Pattern: Dyspnea on exertion or RR 21-25 bpm  Breath Sounds: Inspiratory and expiratory or bilateral wheezing and/or rhonchi  Cough: Strong, productive  Indication for Bronchodilator Therapy: Wheezing associated with pulm disorder  Bronchodilator Assessment Score: 10    Aerosolized bronchodilator medication orders have been revised according to the RT Inhaler-Nebulizer Bronchodilator Protocol below. Respiratory Therapist to perform RT Therapy Protocol Assessment initially then follow the protocol. Repeat RT Therapy Protocol Assessment PRN for score 0-3 or on second treatment, BID, and PRN for scores above 3. No Indications - adjust the frequency to every 6 hours PRN wheezing or bronchospasm, if no treatments needed after 48 hours then discontinue using Per Protocol order mode. If indication present, adjust the RT bronchodilator orders based on the Bronchodilator Assessment Score as indicated below. Use Inhaler orders unless patient has one or more of the following: on home nebulizer, not able to hold breath for 10 seconds, is not alert and oriented, cannot activate and use MDI correctly, or respiratory rate 25 breaths per minute or more, then use the equivalent nebulizer order(s) with same Frequency and PRN reasons based on the score. If a patient is on this medication at home then do not decrease Frequency below that used at home.     0-3 - enter or revise RT bronchodilator order(s) to equivalent RT Bronchodilator order with Frequency of every 4

## 2023-06-05 NOTE — PLAN OF CARE
Problem: Pain  Goal: Verbalizes/displays adequate comfort level or baseline comfort level  6/5/2023 1853 by Theo Colon RN  Outcome: Progressing     Problem: Safety - Adult  Goal: Free from fall injury  6/5/2023 1853 by Theo Colon RN  Outcome: Progressing     Problem: Chronic Conditions and Co-morbidities  Goal: Patient's chronic conditions and co-morbidity symptoms are monitored and maintained or improved  Outcome: Progressing     Problem: Discharge Planning  Goal: Discharge to home or other facility with appropriate resources  Outcome: Progressing

## 2023-06-06 ENCOUNTER — APPOINTMENT (OUTPATIENT)
Dept: GENERAL RADIOLOGY | Age: 78
DRG: 177 | End: 2023-06-06
Payer: MEDICARE

## 2023-06-06 LAB
ALBUMIN SERPL-MCNC: 3.6 G/DL (ref 3.4–5)
ANION GAP SERPL CALCULATED.3IONS-SCNC: 14 MMOL/L (ref 3–16)
ANTI-XA UNFRAC HEPARIN: <0.1 IU/ML (ref 0.3–0.7)
BUN SERPL-MCNC: 88 MG/DL (ref 7–20)
CALCIUM SERPL-MCNC: 8.7 MG/DL (ref 8.3–10.6)
CHLORIDE SERPL-SCNC: 99 MMOL/L (ref 99–110)
CO2 SERPL-SCNC: 26 MMOL/L (ref 21–32)
CREAT SERPL-MCNC: 2.2 MG/DL (ref 0.6–1.2)
GFR SERPLBLD CREATININE-BSD FMLA CKD-EPI: 23 ML/MIN/{1.73_M2}
GLUCOSE BLD-MCNC: 117 MG/DL (ref 70–99)
GLUCOSE BLD-MCNC: 118 MG/DL (ref 70–99)
GLUCOSE BLD-MCNC: 125 MG/DL (ref 70–99)
GLUCOSE BLD-MCNC: 62 MG/DL (ref 70–99)
GLUCOSE BLD-MCNC: 69 MG/DL (ref 70–99)
GLUCOSE BLD-MCNC: 73 MG/DL (ref 70–99)
GLUCOSE SERPL-MCNC: 124 MG/DL (ref 70–99)
INR PPP: 1.85 (ref 0.84–1.16)
PERFORMED ON: ABNORMAL
PERFORMED ON: NORMAL
PHOSPHATE SERPL-MCNC: 4.8 MG/DL (ref 2.5–4.9)
POTASSIUM SERPL-SCNC: 3.5 MMOL/L (ref 3.5–5.1)
PROTHROMBIN TIME: 21.3 SEC (ref 11.5–14.8)
SODIUM SERPL-SCNC: 139 MMOL/L (ref 136–145)

## 2023-06-06 PROCEDURE — 2580000003 HC RX 258: Performed by: HOSPITALIST

## 2023-06-06 PROCEDURE — 94640 AIRWAY INHALATION TREATMENT: CPT

## 2023-06-06 PROCEDURE — 6370000000 HC RX 637 (ALT 250 FOR IP): Performed by: HOSPITALIST

## 2023-06-06 PROCEDURE — 99232 SBSQ HOSP IP/OBS MODERATE 35: CPT | Performed by: INTERNAL MEDICINE

## 2023-06-06 PROCEDURE — 85610 PROTHROMBIN TIME: CPT

## 2023-06-06 PROCEDURE — 6370000000 HC RX 637 (ALT 250 FOR IP): Performed by: NURSE PRACTITIONER

## 2023-06-06 PROCEDURE — 94761 N-INVAS EAR/PLS OXIMETRY MLT: CPT

## 2023-06-06 PROCEDURE — 2580000003 HC RX 258: Performed by: FAMILY MEDICINE

## 2023-06-06 PROCEDURE — 85520 HEPARIN ASSAY: CPT

## 2023-06-06 PROCEDURE — 6370000000 HC RX 637 (ALT 250 FOR IP): Performed by: CLINICAL NURSE SPECIALIST

## 2023-06-06 PROCEDURE — 36415 COLL VENOUS BLD VENIPUNCTURE: CPT

## 2023-06-06 PROCEDURE — 6360000002 HC RX W HCPCS: Performed by: PHYSICIAN ASSISTANT

## 2023-06-06 PROCEDURE — 99232 SBSQ HOSP IP/OBS MODERATE 35: CPT | Performed by: CLINICAL NURSE SPECIALIST

## 2023-06-06 PROCEDURE — 2580000003 HC RX 258

## 2023-06-06 PROCEDURE — 6370000000 HC RX 637 (ALT 250 FOR IP): Performed by: INTERNAL MEDICINE

## 2023-06-06 PROCEDURE — 92526 ORAL FUNCTION THERAPY: CPT

## 2023-06-06 PROCEDURE — 80069 RENAL FUNCTION PANEL: CPT

## 2023-06-06 PROCEDURE — 6360000002 HC RX W HCPCS: Performed by: INTERNAL MEDICINE

## 2023-06-06 PROCEDURE — 1200000000 HC SEMI PRIVATE

## 2023-06-06 PROCEDURE — 97535 SELF CARE MNGMENT TRAINING: CPT

## 2023-06-06 PROCEDURE — 97530 THERAPEUTIC ACTIVITIES: CPT

## 2023-06-06 PROCEDURE — 94669 MECHANICAL CHEST WALL OSCILL: CPT

## 2023-06-06 PROCEDURE — 6370000000 HC RX 637 (ALT 250 FOR IP): Performed by: PHYSICIAN ASSISTANT

## 2023-06-06 PROCEDURE — 71045 X-RAY EXAM CHEST 1 VIEW: CPT

## 2023-06-06 PROCEDURE — 2580000003 HC RX 258: Performed by: INTERNAL MEDICINE

## 2023-06-06 RX ORDER — WATER 1000 ML/1000ML
INJECTION, SOLUTION INTRAVENOUS
Status: COMPLETED
Start: 2023-06-06 | End: 2023-06-06

## 2023-06-06 RX ORDER — TORSEMIDE 20 MG/1
40 TABLET ORAL 2 TIMES DAILY
Status: DISCONTINUED | OUTPATIENT
Start: 2023-06-06 | End: 2023-06-08 | Stop reason: HOSPADM

## 2023-06-06 RX ORDER — OXYCODONE HYDROCHLORIDE 5 MG/1
10 TABLET ORAL EVERY 4 HOURS PRN
Status: DISCONTINUED | OUTPATIENT
Start: 2023-06-06 | End: 2023-06-08 | Stop reason: HOSPADM

## 2023-06-06 RX ORDER — HEPARIN SODIUM 10000 [USP'U]/100ML
5-30 INJECTION, SOLUTION INTRAVENOUS CONTINUOUS
Status: DISCONTINUED | OUTPATIENT
Start: 2023-06-06 | End: 2023-06-06

## 2023-06-06 RX ORDER — METHYLPREDNISOLONE SODIUM SUCCINATE 40 MG/ML
40 INJECTION, POWDER, LYOPHILIZED, FOR SOLUTION INTRAMUSCULAR; INTRAVENOUS DAILY
Status: DISCONTINUED | OUTPATIENT
Start: 2023-06-07 | End: 2023-06-07

## 2023-06-06 RX ORDER — OXYCODONE HYDROCHLORIDE 5 MG/1
5 TABLET ORAL EVERY 4 HOURS PRN
Status: DISCONTINUED | OUTPATIENT
Start: 2023-06-06 | End: 2023-06-08 | Stop reason: HOSPADM

## 2023-06-06 RX ADMIN — Medication 10 ML: at 21:47

## 2023-06-06 RX ADMIN — PIPERACILLIN AND TAZOBACTAM 3375 MG: 3; .375 INJECTION, POWDER, LYOPHILIZED, FOR SOLUTION INTRAVENOUS at 01:08

## 2023-06-06 RX ADMIN — DICLOFENAC SODIUM 2 G: 10 GEL TOPICAL at 09:30

## 2023-06-06 RX ADMIN — Medication 10 ML: at 01:06

## 2023-06-06 RX ADMIN — WARFARIN SODIUM 5 MG: 5 TABLET ORAL at 17:03

## 2023-06-06 RX ADMIN — WATER 10 ML: 1 INJECTION INTRAMUSCULAR; INTRAVENOUS; SUBCUTANEOUS at 04:43

## 2023-06-06 RX ADMIN — OXYCODONE 5 MG: 5 TABLET ORAL at 17:03

## 2023-06-06 RX ADMIN — TORSEMIDE 40 MG: 20 TABLET ORAL at 20:09

## 2023-06-06 RX ADMIN — LEVOTHYROXINE SODIUM 88 MCG: 0.09 TABLET ORAL at 05:26

## 2023-06-06 RX ADMIN — SODIUM CHLORIDE 25 ML: 9 INJECTION, SOLUTION INTRAVENOUS at 01:07

## 2023-06-06 RX ADMIN — Medication 5 ML: at 20:09

## 2023-06-06 RX ADMIN — METHYLPREDNISOLONE SODIUM SUCCINATE 40 MG: 40 INJECTION INTRAMUSCULAR; INTRAVENOUS at 04:43

## 2023-06-06 RX ADMIN — IPRATROPIUM BROMIDE AND ALBUTEROL SULFATE 1 AMPULE: .5; 3 SOLUTION RESPIRATORY (INHALATION) at 20:36

## 2023-06-06 RX ADMIN — HEPARIN SODIUM 11 UNITS/KG/HR: 10000 INJECTION, SOLUTION INTRAVENOUS at 10:03

## 2023-06-06 RX ADMIN — INSULIN LISPRO 11 UNITS: 100 INJECTION, SOLUTION INTRAVENOUS; SUBCUTANEOUS at 13:24

## 2023-06-06 RX ADMIN — OXYCODONE 5 MG: 5 TABLET ORAL at 01:06

## 2023-06-06 RX ADMIN — NYSTATIN 500000 UNITS: 100000 SUSPENSION ORAL at 20:21

## 2023-06-06 RX ADMIN — Medication 10 ML: at 04:43

## 2023-06-06 RX ADMIN — AMIODARONE HYDROCHLORIDE 100 MG: 200 TABLET ORAL at 09:00

## 2023-06-06 RX ADMIN — MIDODRINE HYDROCHLORIDE 5 MG: 5 TABLET ORAL at 09:01

## 2023-06-06 RX ADMIN — HEPARIN SODIUM 5000 UNITS: 5000 INJECTION INTRAVENOUS; SUBCUTANEOUS at 05:26

## 2023-06-06 RX ADMIN — OXYCODONE 5 MG: 5 TABLET ORAL at 09:06

## 2023-06-06 RX ADMIN — STANDARDIZED SENNA CONCENTRATE AND DOCUSATE SODIUM 2 TABLET: 8.6; 5 TABLET ORAL at 09:01

## 2023-06-06 RX ADMIN — OXYCODONE 10 MG: 5 TABLET ORAL at 21:17

## 2023-06-06 RX ADMIN — TORSEMIDE 40 MG: 20 TABLET ORAL at 09:01

## 2023-06-06 RX ADMIN — MIDODRINE HYDROCHLORIDE 5 MG: 5 TABLET ORAL at 17:02

## 2023-06-06 RX ADMIN — MIDODRINE HYDROCHLORIDE 5 MG: 5 TABLET ORAL at 13:24

## 2023-06-06 RX ADMIN — PIPERACILLIN AND TAZOBACTAM 3375 MG: 3; .375 INJECTION, POWDER, LYOPHILIZED, FOR SOLUTION INTRAVENOUS at 16:57

## 2023-06-06 RX ADMIN — METOPROLOL SUCCINATE 50 MG: 50 TABLET, EXTENDED RELEASE ORAL at 09:00

## 2023-06-06 RX ADMIN — Medication 10 ML: at 09:02

## 2023-06-06 RX ADMIN — INSULIN LISPRO 11 UNITS: 100 INJECTION, SOLUTION INTRAVENOUS; SUBCUTANEOUS at 09:29

## 2023-06-06 RX ADMIN — METHYLPREDNISOLONE SODIUM SUCCINATE 40 MG: 40 INJECTION INTRAMUSCULAR; INTRAVENOUS at 09:01

## 2023-06-06 RX ADMIN — BENZONATATE 200 MG: 100 CAPSULE ORAL at 20:09

## 2023-06-06 RX ADMIN — METOCLOPRAMIDE HYDROCHLORIDE 5 MG: 10 TABLET ORAL at 17:02

## 2023-06-06 RX ADMIN — IPRATROPIUM BROMIDE AND ALBUTEROL SULFATE 1 AMPULE: .5; 3 SOLUTION RESPIRATORY (INHALATION) at 07:30

## 2023-06-06 RX ADMIN — METOCLOPRAMIDE HYDROCHLORIDE 5 MG: 10 TABLET ORAL at 09:01

## 2023-06-06 RX ADMIN — PIPERACILLIN AND TAZOBACTAM 3375 MG: 3; .375 INJECTION, POWDER, LYOPHILIZED, FOR SOLUTION INTRAVENOUS at 09:18

## 2023-06-06 RX ADMIN — ASPIRIN 81 MG: 81 TABLET, CHEWABLE ORAL at 09:01

## 2023-06-06 RX ADMIN — CALCITRIOL CAPSULES 0.25 MCG 0.25 MCG: 0.25 CAPSULE ORAL at 17:02

## 2023-06-06 RX ADMIN — IPRATROPIUM BROMIDE AND ALBUTEROL SULFATE 1 AMPULE: .5; 3 SOLUTION RESPIRATORY (INHALATION) at 17:06

## 2023-06-06 RX ADMIN — INSULIN GLARGINE 33 UNITS: 100 INJECTION, SOLUTION SUBCUTANEOUS at 09:28

## 2023-06-06 RX ADMIN — Medication 10 ML: at 09:29

## 2023-06-06 RX ADMIN — WATER 1 ML: 1 INJECTION INTRAMUSCULAR; INTRAVENOUS; SUBCUTANEOUS at 09:01

## 2023-06-06 ASSESSMENT — PAIN DESCRIPTION - PAIN TYPE
TYPE: ACUTE PAIN

## 2023-06-06 ASSESSMENT — PAIN - FUNCTIONAL ASSESSMENT
PAIN_FUNCTIONAL_ASSESSMENT: PREVENTS OR INTERFERES WITH MANY ACTIVE NOT PASSIVE ACTIVITIES
PAIN_FUNCTIONAL_ASSESSMENT: PREVENTS OR INTERFERES SOME ACTIVE ACTIVITIES AND ADLS
PAIN_FUNCTIONAL_ASSESSMENT: PREVENTS OR INTERFERES SOME ACTIVE ACTIVITIES AND ADLS
PAIN_FUNCTIONAL_ASSESSMENT: PREVENTS OR INTERFERES WITH MANY ACTIVE NOT PASSIVE ACTIVITIES
PAIN_FUNCTIONAL_ASSESSMENT: PREVENTS OR INTERFERES SOME ACTIVE ACTIVITIES AND ADLS
PAIN_FUNCTIONAL_ASSESSMENT: PREVENTS OR INTERFERES SOME ACTIVE ACTIVITIES AND ADLS

## 2023-06-06 ASSESSMENT — PAIN SCALES - GENERAL
PAINLEVEL_OUTOF10: 8
PAINLEVEL_OUTOF10: 8
PAINLEVEL_OUTOF10: 0
PAINLEVEL_OUTOF10: 7
PAINLEVEL_OUTOF10: 7
PAINLEVEL_OUTOF10: 0
PAINLEVEL_OUTOF10: 6
PAINLEVEL_OUTOF10: 8

## 2023-06-06 ASSESSMENT — PAIN DESCRIPTION - FREQUENCY
FREQUENCY: CONTINUOUS

## 2023-06-06 ASSESSMENT — PAIN DESCRIPTION - DESCRIPTORS
DESCRIPTORS: ACHING

## 2023-06-06 ASSESSMENT — PAIN DESCRIPTION - ONSET
ONSET: ON-GOING

## 2023-06-06 ASSESSMENT — PAIN DESCRIPTION - LOCATION
LOCATION: ABDOMEN

## 2023-06-06 ASSESSMENT — PAIN DESCRIPTION - ORIENTATION
ORIENTATION: LOWER;RIGHT;LEFT
ORIENTATION: LEFT;RIGHT;MID
ORIENTATION: RIGHT;LEFT;LOWER
ORIENTATION: LEFT;RIGHT;LOWER
ORIENTATION: RIGHT;LEFT;LOWER
ORIENTATION: LEFT;RIGHT;MID

## 2023-06-07 LAB
ALBUMIN SERPL-MCNC: 3.5 G/DL (ref 3.4–5)
ANION GAP SERPL CALCULATED.3IONS-SCNC: 13 MMOL/L (ref 3–16)
ANION GAP SERPL CALCULATED.3IONS-SCNC: 15 MMOL/L (ref 3–16)
BASOPHILS # BLD: 0 K/UL (ref 0–0.2)
BASOPHILS NFR BLD: 0.1 %
BUN SERPL-MCNC: 88 MG/DL (ref 7–20)
BUN SERPL-MCNC: 88 MG/DL (ref 7–20)
CALCIUM SERPL-MCNC: 8.3 MG/DL (ref 8.3–10.6)
CALCIUM SERPL-MCNC: 8.3 MG/DL (ref 8.3–10.6)
CHLORIDE SERPL-SCNC: 101 MMOL/L (ref 99–110)
CHLORIDE SERPL-SCNC: 102 MMOL/L (ref 99–110)
CO2 SERPL-SCNC: 26 MMOL/L (ref 21–32)
CO2 SERPL-SCNC: 26 MMOL/L (ref 21–32)
CREAT SERPL-MCNC: 2 MG/DL (ref 0.6–1.2)
CREAT SERPL-MCNC: 2.2 MG/DL (ref 0.6–1.2)
DEPRECATED RDW RBC AUTO: 18.9 % (ref 12.4–15.4)
EOSINOPHIL # BLD: 0 K/UL (ref 0–0.6)
EOSINOPHIL NFR BLD: 0 %
GFR SERPLBLD CREATININE-BSD FMLA CKD-EPI: 23 ML/MIN/{1.73_M2}
GFR SERPLBLD CREATININE-BSD FMLA CKD-EPI: 25 ML/MIN/{1.73_M2}
GLUCOSE BLD-MCNC: 104 MG/DL (ref 70–99)
GLUCOSE BLD-MCNC: 147 MG/DL (ref 70–99)
GLUCOSE BLD-MCNC: 195 MG/DL (ref 70–99)
GLUCOSE BLD-MCNC: 234 MG/DL (ref 70–99)
GLUCOSE SERPL-MCNC: 96 MG/DL (ref 70–99)
GLUCOSE SERPL-MCNC: 99 MG/DL (ref 70–99)
HCT VFR BLD AUTO: 35.1 % (ref 36–48)
HGB BLD-MCNC: 11 G/DL (ref 12–16)
INR PPP: 1.84 (ref 0.84–1.16)
LYMPHOCYTES # BLD: 0.5 K/UL (ref 1–5.1)
LYMPHOCYTES NFR BLD: 5.4 %
MAGNESIUM SERPL-MCNC: 2.6 MG/DL (ref 1.8–2.4)
MCH RBC QN AUTO: 29.2 PG (ref 26–34)
MCHC RBC AUTO-ENTMCNC: 31.5 G/DL (ref 31–36)
MCV RBC AUTO: 92.8 FL (ref 80–100)
MONOCYTES # BLD: 1.2 K/UL (ref 0–1.3)
MONOCYTES NFR BLD: 12.8 %
NEUTROPHILS # BLD: 7.5 K/UL (ref 1.7–7.7)
NEUTROPHILS NFR BLD: 81.7 %
PERFORMED ON: ABNORMAL
PHOSPHATE SERPL-MCNC: 4.2 MG/DL (ref 2.5–4.9)
PLATELET # BLD AUTO: 203 K/UL (ref 135–450)
PMV BLD AUTO: 8.9 FL (ref 5–10.5)
POTASSIUM SERPL-SCNC: 2.9 MMOL/L (ref 3.5–5.1)
POTASSIUM SERPL-SCNC: 3.1 MMOL/L (ref 3.5–5.1)
POTASSIUM SERPL-SCNC: 3.7 MMOL/L (ref 3.5–5.1)
PROTHROMBIN TIME: 21.2 SEC (ref 11.5–14.8)
RBC # BLD AUTO: 3.78 M/UL (ref 4–5.2)
SODIUM SERPL-SCNC: 140 MMOL/L (ref 136–145)
SODIUM SERPL-SCNC: 143 MMOL/L (ref 136–145)
WBC # BLD AUTO: 9.2 K/UL (ref 4–11)

## 2023-06-07 PROCEDURE — 6370000000 HC RX 637 (ALT 250 FOR IP): Performed by: CLINICAL NURSE SPECIALIST

## 2023-06-07 PROCEDURE — 6360000002 HC RX W HCPCS: Performed by: INTERNAL MEDICINE

## 2023-06-07 PROCEDURE — 6370000000 HC RX 637 (ALT 250 FOR IP): Performed by: NURSE PRACTITIONER

## 2023-06-07 PROCEDURE — 85610 PROTHROMBIN TIME: CPT

## 2023-06-07 PROCEDURE — 80069 RENAL FUNCTION PANEL: CPT

## 2023-06-07 PROCEDURE — 2580000003 HC RX 258: Performed by: HOSPITALIST

## 2023-06-07 PROCEDURE — 2580000003 HC RX 258: Performed by: INTERNAL MEDICINE

## 2023-06-07 PROCEDURE — 83735 ASSAY OF MAGNESIUM: CPT

## 2023-06-07 PROCEDURE — 92526 ORAL FUNCTION THERAPY: CPT

## 2023-06-07 PROCEDURE — 6370000000 HC RX 637 (ALT 250 FOR IP): Performed by: INTERNAL MEDICINE

## 2023-06-07 PROCEDURE — 84132 ASSAY OF SERUM POTASSIUM: CPT

## 2023-06-07 PROCEDURE — 99232 SBSQ HOSP IP/OBS MODERATE 35: CPT | Performed by: CLINICAL NURSE SPECIALIST

## 2023-06-07 PROCEDURE — 85025 COMPLETE CBC W/AUTO DIFF WBC: CPT

## 2023-06-07 PROCEDURE — 36415 COLL VENOUS BLD VENIPUNCTURE: CPT

## 2023-06-07 PROCEDURE — 1200000000 HC SEMI PRIVATE

## 2023-06-07 PROCEDURE — 94761 N-INVAS EAR/PLS OXIMETRY MLT: CPT

## 2023-06-07 PROCEDURE — 6370000000 HC RX 637 (ALT 250 FOR IP): Performed by: FAMILY MEDICINE

## 2023-06-07 PROCEDURE — 6370000000 HC RX 637 (ALT 250 FOR IP): Performed by: PHYSICIAN ASSISTANT

## 2023-06-07 PROCEDURE — 6370000000 HC RX 637 (ALT 250 FOR IP): Performed by: HOSPITALIST

## 2023-06-07 PROCEDURE — 94669 MECHANICAL CHEST WALL OSCILL: CPT

## 2023-06-07 PROCEDURE — 94640 AIRWAY INHALATION TREATMENT: CPT

## 2023-06-07 PROCEDURE — 2580000003 HC RX 258: Performed by: FAMILY MEDICINE

## 2023-06-07 RX ORDER — POTASSIUM CHLORIDE 20 MEQ/1
40 TABLET, EXTENDED RELEASE ORAL ONCE
Status: COMPLETED | OUTPATIENT
Start: 2023-06-07 | End: 2023-06-07

## 2023-06-07 RX ORDER — METOPROLOL SUCCINATE 25 MG/1
25 TABLET, EXTENDED RELEASE ORAL ONCE
Status: COMPLETED | OUTPATIENT
Start: 2023-06-07 | End: 2023-06-07

## 2023-06-07 RX ORDER — SPIRONOLACTONE 25 MG/1
25 TABLET ORAL DAILY
Status: DISCONTINUED | OUTPATIENT
Start: 2023-06-07 | End: 2023-06-08 | Stop reason: HOSPADM

## 2023-06-07 RX ORDER — INSULIN GLARGINE 100 [IU]/ML
20 INJECTION, SOLUTION SUBCUTANEOUS EVERY MORNING
Status: DISCONTINUED | OUTPATIENT
Start: 2023-06-08 | End: 2023-06-08 | Stop reason: HOSPADM

## 2023-06-07 RX ORDER — PREDNISONE 20 MG/1
20 TABLET ORAL DAILY
Status: DISCONTINUED | OUTPATIENT
Start: 2023-06-08 | End: 2023-06-08 | Stop reason: HOSPADM

## 2023-06-07 RX ORDER — INSULIN LISPRO 100 [IU]/ML
6 INJECTION, SOLUTION INTRAVENOUS; SUBCUTANEOUS
Status: DISCONTINUED | OUTPATIENT
Start: 2023-06-07 | End: 2023-06-08 | Stop reason: HOSPADM

## 2023-06-07 RX ADMIN — ONDANSETRON 4 MG: 4 TABLET, ORALLY DISINTEGRATING ORAL at 21:04

## 2023-06-07 RX ADMIN — CALCITRIOL CAPSULES 0.25 MCG 0.25 MCG: 0.25 CAPSULE ORAL at 17:05

## 2023-06-07 RX ADMIN — METOCLOPRAMIDE HYDROCHLORIDE 5 MG: 10 TABLET ORAL at 09:04

## 2023-06-07 RX ADMIN — INSULIN LISPRO 2 UNITS: 100 INJECTION, SOLUTION INTRAVENOUS; SUBCUTANEOUS at 17:14

## 2023-06-07 RX ADMIN — SODIUM CHLORIDE 25 ML: 9 INJECTION, SOLUTION INTRAVENOUS at 00:30

## 2023-06-07 RX ADMIN — TORSEMIDE 40 MG: 20 TABLET ORAL at 09:04

## 2023-06-07 RX ADMIN — MIDODRINE HYDROCHLORIDE 5 MG: 5 TABLET ORAL at 09:04

## 2023-06-07 RX ADMIN — TORSEMIDE 40 MG: 20 TABLET ORAL at 21:06

## 2023-06-07 RX ADMIN — BENZONATATE 200 MG: 100 CAPSULE ORAL at 21:05

## 2023-06-07 RX ADMIN — NYSTATIN 500000 UNITS: 100000 SUSPENSION ORAL at 09:09

## 2023-06-07 RX ADMIN — Medication 10 ML: at 22:20

## 2023-06-07 RX ADMIN — POTASSIUM CHLORIDE 40 MEQ: 1500 TABLET, EXTENDED RELEASE ORAL at 22:04

## 2023-06-07 RX ADMIN — Medication 10 ML: at 04:42

## 2023-06-07 RX ADMIN — PIPERACILLIN AND TAZOBACTAM 3375 MG: 3; .375 INJECTION, POWDER, LYOPHILIZED, FOR SOLUTION INTRAVENOUS at 00:31

## 2023-06-07 RX ADMIN — LEVOTHYROXINE SODIUM 88 MCG: 0.09 TABLET ORAL at 06:21

## 2023-06-07 RX ADMIN — NYSTATIN 500000 UNITS: 100000 SUSPENSION ORAL at 21:04

## 2023-06-07 RX ADMIN — BENZOCAINE AND MENTHOL 1 LOZENGE: 15; 3.6 LOZENGE ORAL at 04:32

## 2023-06-07 RX ADMIN — DICLOFENAC SODIUM 2 G: 10 GEL TOPICAL at 09:08

## 2023-06-07 RX ADMIN — DIPHENHYDRAMINE HYDROCHLORIDE 25 MG: 25 TABLET ORAL at 21:05

## 2023-06-07 RX ADMIN — AMIODARONE HYDROCHLORIDE 100 MG: 200 TABLET ORAL at 09:04

## 2023-06-07 RX ADMIN — DIPHENHYDRAMINE HYDROCHLORIDE 25 MG: 25 TABLET ORAL at 00:25

## 2023-06-07 RX ADMIN — ASPIRIN 81 MG: 81 TABLET, CHEWABLE ORAL at 09:04

## 2023-06-07 RX ADMIN — Medication 5 ML: at 21:06

## 2023-06-07 RX ADMIN — IPRATROPIUM BROMIDE AND ALBUTEROL SULFATE 1 AMPULE: .5; 3 SOLUTION RESPIRATORY (INHALATION) at 07:30

## 2023-06-07 RX ADMIN — Medication 10 ML: at 00:29

## 2023-06-07 RX ADMIN — SPIRONOLACTONE 25 MG: 25 TABLET ORAL at 09:14

## 2023-06-07 RX ADMIN — IPRATROPIUM BROMIDE AND ALBUTEROL SULFATE 1 AMPULE: .5; 3 SOLUTION RESPIRATORY (INHALATION) at 13:25

## 2023-06-07 RX ADMIN — METOPROLOL SUCCINATE 50 MG: 50 TABLET, EXTENDED RELEASE ORAL at 09:13

## 2023-06-07 RX ADMIN — PIPERACILLIN AND TAZOBACTAM 3375 MG: 3; .375 INJECTION, POWDER, LYOPHILIZED, FOR SOLUTION INTRAVENOUS at 09:18

## 2023-06-07 RX ADMIN — Medication 10 ML: at 10:31

## 2023-06-07 RX ADMIN — NYSTATIN 500000 UNITS: 100000 SUSPENSION ORAL at 13:15

## 2023-06-07 RX ADMIN — MIDODRINE HYDROCHLORIDE 5 MG: 5 TABLET ORAL at 13:15

## 2023-06-07 RX ADMIN — METOPROLOL SUCCINATE 25 MG: 25 TABLET, EXTENDED RELEASE ORAL at 17:06

## 2023-06-07 RX ADMIN — METHYLPREDNISOLONE SODIUM SUCCINATE 40 MG: 40 INJECTION, POWDER, FOR SOLUTION INTRAMUSCULAR; INTRAVENOUS at 09:09

## 2023-06-07 RX ADMIN — PIPERACILLIN AND TAZOBACTAM 3375 MG: 3; .375 INJECTION, POWDER, LYOPHILIZED, FOR SOLUTION INTRAVENOUS at 17:10

## 2023-06-07 RX ADMIN — METOCLOPRAMIDE HYDROCHLORIDE 5 MG: 10 TABLET ORAL at 17:05

## 2023-06-07 RX ADMIN — WARFARIN SODIUM 5 MG: 5 TABLET ORAL at 17:05

## 2023-06-07 RX ADMIN — Medication 10 ML: at 10:30

## 2023-06-07 RX ADMIN — IPRATROPIUM BROMIDE AND ALBUTEROL SULFATE 1 AMPULE: .5; 3 SOLUTION RESPIRATORY (INHALATION) at 20:20

## 2023-06-07 RX ADMIN — POTASSIUM BICARBONATE 50 MEQ: 978 TABLET, EFFERVESCENT ORAL at 09:13

## 2023-06-07 RX ADMIN — INSULIN GLARGINE 33 UNITS: 100 INJECTION, SOLUTION SUBCUTANEOUS at 09:14

## 2023-06-07 RX ADMIN — MELATONIN TAB 3 MG 3 MG: 3 TAB at 21:05

## 2023-06-07 RX ADMIN — NYSTATIN 500000 UNITS: 100000 SUSPENSION ORAL at 17:05

## 2023-06-07 RX ADMIN — OXYCODONE 5 MG: 5 TABLET ORAL at 04:32

## 2023-06-07 RX ADMIN — INSULIN LISPRO 6 UNITS: 100 INJECTION, SOLUTION INTRAVENOUS; SUBCUTANEOUS at 17:14

## 2023-06-07 RX ADMIN — MIDODRINE HYDROCHLORIDE 5 MG: 5 TABLET ORAL at 17:05

## 2023-06-07 RX ADMIN — OXYCODONE 5 MG: 5 TABLET ORAL at 21:05

## 2023-06-07 ASSESSMENT — PAIN DESCRIPTION - LOCATION
LOCATION: ABDOMEN
LOCATION: ABDOMEN
LOCATION: CHEST;BACK
LOCATION: ABDOMEN
LOCATION: ABDOMEN
LOCATION: CHEST;BACK

## 2023-06-07 ASSESSMENT — PAIN - FUNCTIONAL ASSESSMENT
PAIN_FUNCTIONAL_ASSESSMENT: PREVENTS OR INTERFERES SOME ACTIVE ACTIVITIES AND ADLS
PAIN_FUNCTIONAL_ASSESSMENT: PREVENTS OR INTERFERES WITH MANY ACTIVE NOT PASSIVE ACTIVITIES
PAIN_FUNCTIONAL_ASSESSMENT: PREVENTS OR INTERFERES WITH MANY ACTIVE NOT PASSIVE ACTIVITIES

## 2023-06-07 ASSESSMENT — PAIN DESCRIPTION - DESCRIPTORS
DESCRIPTORS: ACHING
DESCRIPTORS: ACHING
DESCRIPTORS: SORE
DESCRIPTORS: ACHING

## 2023-06-07 ASSESSMENT — PAIN DESCRIPTION - ORIENTATION
ORIENTATION: RIGHT;LEFT
ORIENTATION: RIGHT;LEFT;MID
ORIENTATION: RIGHT;LEFT;MID
ORIENTATION: RIGHT;LEFT;LOWER
ORIENTATION: RIGHT;LEFT;MID
ORIENTATION: RIGHT;LEFT

## 2023-06-07 ASSESSMENT — PAIN DESCRIPTION - FREQUENCY
FREQUENCY: CONTINUOUS

## 2023-06-07 ASSESSMENT — PAIN SCALES - GENERAL
PAINLEVEL_OUTOF10: 3
PAINLEVEL_OUTOF10: 0
PAINLEVEL_OUTOF10: 5
PAINLEVEL_OUTOF10: 0
PAINLEVEL_OUTOF10: 5
PAINLEVEL_OUTOF10: 0
PAINLEVEL_OUTOF10: 5
PAINLEVEL_OUTOF10: 6
PAINLEVEL_OUTOF10: 5

## 2023-06-07 ASSESSMENT — PAIN DESCRIPTION - PAIN TYPE
TYPE: ACUTE PAIN

## 2023-06-07 ASSESSMENT — PAIN DESCRIPTION - ONSET
ONSET: ON-GOING

## 2023-06-07 NOTE — CARE COORDINATION
Chart reviewed for discharge planning    CM/SW has continued to follow patient progress to anticipate potential discharge needs. At this time, patient is not ready for discharge. Inpatient day #-  15    Barrier(s) to discharge-patient- Still has significant chest congestion and cough, unable to bring up phlegm. Started using chest vest therapy. On 2 L O2. Barium swallow study suggestive of silent aspiration. Continue bronchodilators and IV Solu-Medrol. Respiratory toilet    Tentative discharge plan- home with [facility]     Tentative discharge date- TBD -when medically stable    Case management will continue to follow progress and update discharge plan as needed.

## 2023-06-07 NOTE — CARE COORDINATION
06/07/23 1646   IMM Letter   IMM Letter given to Patient/Family/Significant other/Guardian/POA/by: Second IMM letter givn to patient and spouse by CM  (patient signed)   IMM Letter date given: 06/07/23   IMM Letter time given: 1640

## 2023-06-08 VITALS
BODY MASS INDEX: 29.96 KG/M2 | SYSTOLIC BLOOD PRESSURE: 105 MMHG | HEART RATE: 106 BPM | RESPIRATION RATE: 18 BRPM | TEMPERATURE: 96.8 F | OXYGEN SATURATION: 95 % | DIASTOLIC BLOOD PRESSURE: 72 MMHG | HEIGHT: 66 IN | WEIGHT: 186.4 LBS

## 2023-06-08 LAB
ALBUMIN SERPL-MCNC: 3.7 G/DL (ref 3.4–5)
ANION GAP SERPL CALCULATED.3IONS-SCNC: 11 MMOL/L (ref 3–16)
BUN SERPL-MCNC: 86 MG/DL (ref 7–20)
CALCIUM SERPL-MCNC: 8.7 MG/DL (ref 8.3–10.6)
CHLORIDE SERPL-SCNC: 104 MMOL/L (ref 99–110)
CO2 SERPL-SCNC: 30 MMOL/L (ref 21–32)
CREAT SERPL-MCNC: 2.2 MG/DL (ref 0.6–1.2)
GFR SERPLBLD CREATININE-BSD FMLA CKD-EPI: 23 ML/MIN/{1.73_M2}
GLUCOSE BLD-MCNC: 102 MG/DL (ref 70–99)
GLUCOSE BLD-MCNC: 91 MG/DL (ref 70–99)
GLUCOSE SERPL-MCNC: 93 MG/DL (ref 70–99)
INR PPP: 2.31 (ref 0.84–1.16)
NT-PROBNP SERPL-MCNC: ABNORMAL PG/ML (ref 0–449)
PERFORMED ON: ABNORMAL
PERFORMED ON: NORMAL
PHOSPHATE SERPL-MCNC: 4.1 MG/DL (ref 2.5–4.9)
POTASSIUM SERPL-SCNC: 3.7 MMOL/L (ref 3.5–5.1)
PROTHROMBIN TIME: 25.3 SEC (ref 11.5–14.8)
SODIUM SERPL-SCNC: 145 MMOL/L (ref 136–145)

## 2023-06-08 PROCEDURE — 6370000000 HC RX 637 (ALT 250 FOR IP): Performed by: CLINICAL NURSE SPECIALIST

## 2023-06-08 PROCEDURE — 99232 SBSQ HOSP IP/OBS MODERATE 35: CPT | Performed by: CLINICAL NURSE SPECIALIST

## 2023-06-08 PROCEDURE — 94669 MECHANICAL CHEST WALL OSCILL: CPT

## 2023-06-08 PROCEDURE — 83880 ASSAY OF NATRIURETIC PEPTIDE: CPT

## 2023-06-08 PROCEDURE — 85610 PROTHROMBIN TIME: CPT

## 2023-06-08 PROCEDURE — 6370000000 HC RX 637 (ALT 250 FOR IP): Performed by: NURSE PRACTITIONER

## 2023-06-08 PROCEDURE — 36415 COLL VENOUS BLD VENIPUNCTURE: CPT

## 2023-06-08 PROCEDURE — 94761 N-INVAS EAR/PLS OXIMETRY MLT: CPT

## 2023-06-08 PROCEDURE — 94640 AIRWAY INHALATION TREATMENT: CPT

## 2023-06-08 PROCEDURE — 2580000003 HC RX 258: Performed by: INTERNAL MEDICINE

## 2023-06-08 PROCEDURE — 80069 RENAL FUNCTION PANEL: CPT

## 2023-06-08 PROCEDURE — 2580000003 HC RX 258: Performed by: HOSPITALIST

## 2023-06-08 PROCEDURE — 2580000003 HC RX 258: Performed by: FAMILY MEDICINE

## 2023-06-08 PROCEDURE — 99232 SBSQ HOSP IP/OBS MODERATE 35: CPT | Performed by: INTERNAL MEDICINE

## 2023-06-08 PROCEDURE — 6370000000 HC RX 637 (ALT 250 FOR IP): Performed by: HOSPITALIST

## 2023-06-08 PROCEDURE — 6360000002 HC RX W HCPCS: Performed by: INTERNAL MEDICINE

## 2023-06-08 RX ORDER — WARFARIN SODIUM 5 MG/1
5 TABLET ORAL
Status: DISCONTINUED | OUTPATIENT
Start: 2023-06-09 | End: 2023-06-08 | Stop reason: HOSPADM

## 2023-06-08 RX ORDER — MIDODRINE HYDROCHLORIDE 5 MG/1
5 TABLET ORAL
Qty: 90 TABLET | Refills: 3 | Status: SHIPPED | OUTPATIENT
Start: 2023-06-08

## 2023-06-08 RX ORDER — PREDNISONE 20 MG/1
20 TABLET ORAL DAILY
Qty: 5 TABLET | Refills: 0 | Status: SHIPPED | OUTPATIENT
Start: 2023-06-08 | End: 2023-06-13

## 2023-06-08 RX ORDER — AMOXICILLIN AND CLAVULANATE POTASSIUM 875; 125 MG/1; MG/1
1 TABLET, FILM COATED ORAL 2 TIMES DAILY
Qty: 14 TABLET | Refills: 0 | Status: SHIPPED | OUTPATIENT
Start: 2023-06-08 | End: 2023-06-15

## 2023-06-08 RX ORDER — NITROGLYCERIN 0.4 MG/1
0.4 TABLET SUBLINGUAL EVERY 5 MIN PRN
Qty: 25 TABLET | Refills: 3 | Status: SHIPPED | OUTPATIENT
Start: 2023-06-08

## 2023-06-08 RX ORDER — AMIODARONE HYDROCHLORIDE 200 MG/1
100 TABLET ORAL DAILY
Qty: 30 TABLET | Refills: 2 | Status: SHIPPED | OUTPATIENT
Start: 2023-06-08 | End: 2023-12-05

## 2023-06-08 RX ORDER — BENZONATATE 200 MG/1
200 CAPSULE ORAL 3 TIMES DAILY PRN
Qty: 40 CAPSULE | Refills: 2 | Status: SHIPPED | OUTPATIENT
Start: 2023-06-08 | End: 2023-07-08

## 2023-06-08 RX ORDER — WARFARIN SODIUM 2.5 MG/1
2.5 TABLET ORAL
Status: DISCONTINUED | OUTPATIENT
Start: 2023-06-08 | End: 2023-06-08 | Stop reason: HOSPADM

## 2023-06-08 RX ORDER — SPIRONOLACTONE 25 MG/1
25 TABLET ORAL DAILY
Qty: 30 TABLET | Refills: 3 | Status: SHIPPED | OUTPATIENT
Start: 2023-06-08

## 2023-06-08 RX ORDER — METOCLOPRAMIDE 10 MG/1
5 TABLET ORAL
Status: COMPLETED | OUTPATIENT
Start: 2023-06-08 | End: 2023-06-08

## 2023-06-08 RX ADMIN — INSULIN LISPRO 6 UNITS: 100 INJECTION, SOLUTION INTRAVENOUS; SUBCUTANEOUS at 09:40

## 2023-06-08 RX ADMIN — ASPIRIN 81 MG: 81 TABLET, CHEWABLE ORAL at 09:36

## 2023-06-08 RX ADMIN — LEVOTHYROXINE SODIUM 88 MCG: 0.09 TABLET ORAL at 05:00

## 2023-06-08 RX ADMIN — PREDNISONE 20 MG: 20 TABLET ORAL at 09:35

## 2023-06-08 RX ADMIN — Medication 10 ML: at 00:25

## 2023-06-08 RX ADMIN — BENZONATATE 200 MG: 100 CAPSULE ORAL at 06:45

## 2023-06-08 RX ADMIN — IPRATROPIUM BROMIDE AND ALBUTEROL SULFATE 1 AMPULE: .5; 3 SOLUTION RESPIRATORY (INHALATION) at 09:12

## 2023-06-08 RX ADMIN — OXYCODONE 5 MG: 5 TABLET ORAL at 04:50

## 2023-06-08 RX ADMIN — SPIRONOLACTONE 25 MG: 25 TABLET ORAL at 09:36

## 2023-06-08 RX ADMIN — Medication 10 ML: at 12:50

## 2023-06-08 RX ADMIN — MIDODRINE HYDROCHLORIDE 5 MG: 5 TABLET ORAL at 09:36

## 2023-06-08 RX ADMIN — Medication 10 ML: at 04:50

## 2023-06-08 RX ADMIN — SODIUM CHLORIDE 25 ML: 9 INJECTION, SOLUTION INTRAVENOUS at 00:28

## 2023-06-08 RX ADMIN — NITROGLYCERIN 0.4 MG: 0.4 TABLET, ORALLY DISINTEGRATING SUBLINGUAL at 12:47

## 2023-06-08 RX ADMIN — STANDARDIZED SENNA CONCENTRATE AND DOCUSATE SODIUM 2 TABLET: 8.6; 5 TABLET ORAL at 09:36

## 2023-06-08 RX ADMIN — OXYCODONE 5 MG: 5 TABLET ORAL at 00:25

## 2023-06-08 RX ADMIN — NYSTATIN 500000 UNITS: 100000 SUSPENSION ORAL at 09:36

## 2023-06-08 RX ADMIN — METOCLOPRAMIDE 5 MG: 10 TABLET ORAL at 09:35

## 2023-06-08 RX ADMIN — Medication 5 ML: at 07:00

## 2023-06-08 RX ADMIN — MIDODRINE HYDROCHLORIDE 5 MG: 5 TABLET ORAL at 12:46

## 2023-06-08 RX ADMIN — METOPROLOL SUCCINATE 50 MG: 50 TABLET, EXTENDED RELEASE ORAL at 09:35

## 2023-06-08 RX ADMIN — INSULIN GLARGINE 20 UNITS: 100 INJECTION, SOLUTION SUBCUTANEOUS at 09:38

## 2023-06-08 RX ADMIN — TORSEMIDE 40 MG: 20 TABLET ORAL at 09:36

## 2023-06-08 RX ADMIN — BENZOCAINE AND MENTHOL 1 LOZENGE: 15; 3.6 LOZENGE ORAL at 04:44

## 2023-06-08 RX ADMIN — AMIODARONE HYDROCHLORIDE 100 MG: 200 TABLET ORAL at 09:36

## 2023-06-08 RX ADMIN — PIPERACILLIN AND TAZOBACTAM 3375 MG: 3; .375 INJECTION, POWDER, LYOPHILIZED, FOR SOLUTION INTRAVENOUS at 00:30

## 2023-06-08 ASSESSMENT — PAIN DESCRIPTION - PAIN TYPE
TYPE: ACUTE PAIN

## 2023-06-08 ASSESSMENT — PAIN DESCRIPTION - ORIENTATION
ORIENTATION: MID
ORIENTATION: UPPER;RIGHT;LEFT
ORIENTATION: MID
ORIENTATION: RIGHT;LEFT
ORIENTATION: UPPER

## 2023-06-08 ASSESSMENT — PAIN DESCRIPTION - ONSET
ONSET: ON-GOING

## 2023-06-08 ASSESSMENT — PAIN DESCRIPTION - DESCRIPTORS
DESCRIPTORS: ACHING
DESCRIPTORS: ACHING
DESCRIPTORS: SQUEEZING
DESCRIPTORS: ACHING

## 2023-06-08 ASSESSMENT — PAIN DESCRIPTION - FREQUENCY
FREQUENCY: INTERMITTENT

## 2023-06-08 ASSESSMENT — PAIN SCALES - GENERAL
PAINLEVEL_OUTOF10: 8
PAINLEVEL_OUTOF10: 6
PAINLEVEL_OUTOF10: 9
PAINLEVEL_OUTOF10: 8
PAINLEVEL_OUTOF10: 2
PAINLEVEL_OUTOF10: 6
PAINLEVEL_OUTOF10: 3

## 2023-06-08 ASSESSMENT — PAIN DESCRIPTION - LOCATION
LOCATION: CHEST
LOCATION: CHEST
LOCATION: CHEST;BACK
LOCATION: BACK;CHEST
LOCATION: BACK
LOCATION: CHEST
LOCATION: CHEST

## 2023-06-08 ASSESSMENT — PAIN - FUNCTIONAL ASSESSMENT
PAIN_FUNCTIONAL_ASSESSMENT: PREVENTS OR INTERFERES SOME ACTIVE ACTIVITIES AND ADLS

## 2023-06-08 NOTE — PROGRESS NOTES
06/04/23 7975   RT Protocol   History Pulmonary Disease 1   Respiratory pattern 2   Breath sounds 6   Cough 1   Indications for Bronchodilator Therapy Wheezing associated with pulm disorder   Bronchodilator Assessment Score 10
06/05/23 2031   Treatment   Treatment Type Vest
06/06/23 2037   Treatment   Treatment Type Vest
06/07/23 2024   RT Protocol   History Pulmonary Disease 2   Respiratory pattern 0   Breath sounds 6   Cough 0   Bronchodilator Assessment Score 8
[address]   Daily Progress Note      Admit Date:  5/23/2023    HPI:    Ms. El Newell 69 y.o. female with history of CAD/CABG in 2018, PAF with maze 2018, HTN, HLD, DVT/PE on coumadin, 2 CVs unsuccessful, AV stenosis, MR replacement    She is admitted with increased shortness of breath, cough and failed 2 antibiotic treatments. She was started on IV lasix drip and is not diuresing well due to hypotension. Echo showed severe aortic stenosis    Subjective:  Patient is being seen for acute on chronic systolic heart failure. There were no acute overnight cardiac events. She just returned from swallowing evaluation and is silently aspirating. She is sitting up in the chair on room air and sometimes 1 L. Still with cough but much better. She used the percussion vest this morning. Bnp 10K-11K creat 2.8-2.3 bun 80 not iron deficient   at her side. Patient and  do not want any further procedures done for TAVR    Objective:   BP 96/63   Pulse 78   Temp 97 °F (36.1 °C) (Temporal)   Resp 18   Ht 5' 6\" (1.676 m)   Wt 191 lb 1.6 oz (86.7 kg)   SpO2 91%   BMI 30.84 kg/m²     Intake/Output Summary (Last 24 hours) at 6/5/2023 0844  Last data filed at 6/5/2023 0439  Gross per 24 hour   Intake 960 ml   Output 1300 ml   Net -340 ml          Physical Exam:  General:  Awake, alert, oriented in NAD, muscle wasting  Skin:  Warm and dry. No unusual bruising or rash  Neck:  Supple. No JVD or carotid bruit appreciated  Chest:  Normal effort.   Rhonchi and wheezing bilaterally but much improved  Cardiovascular:  RRR, S1/S2, no murmur/gallop/rub  Abdomen:  Soft, nontender, +bowel sounds  Extremities:  bilateral lower ankle edema, support stockings on bilaterally  Neurological: No focal deficits  Psychological: Normal mood and affect      Medications:    warfarin  2.5 mg Oral Once per day on Mon Fri Sat    And    [START ON 6/6/2023] warfarin  5 mg Oral Once per day on Sun Tue Wed Thu
[facility] Pulmonary/CCM Progress note      Admit Date: 5/23/2023    Chief Complaint: Shortness of breath    Subjective: Interval History: Still has significant chest congestion and cough, unable to bring up phlegm. Started using chest vest therapy. On 2 L O2. Barium swallow study suggestive of silent aspiration.     Scheduled Meds:   warfarin  2.5 mg Oral Once per day on Mon Fri Sat    And    [START ON 6/6/2023] warfarin  5 mg Oral Once per day on Sun Tue Wed Thu    piperacillin-tazobactam  3,375 mg IntraVENous Q8H    insulin glargine  33 Units SubCUTAneous QAM    insulin lispro  11 Units SubCUTAneous TID     torsemide  40 mg Oral Daily    insulin lispro  0-8 Units SubCUTAneous TID WC    insulin lispro  0-4 Units SubCUTAneous Nightly    heparin (porcine)  5,000 Units SubCUTAneous 3 times per day    ipratropium 0.5 mg-albuterol 2.5 mg  1 ampule Inhalation TID    methylPREDNISolone  40 mg IntraVENous Q6H    midodrine  5 mg Oral TID     [Held by provider] spironolactone  25 mg Oral BID    polyethylene glycol  17 g Oral Daily    sennosides-docusate sodium  2 tablet Oral Daily    metoclopramide  5 mg Oral BID WC    lidocaine 1 % injection  5 mL IntraDERmal Once    sodium chloride flush  5-40 mL IntraVENous 2 times per day    diclofenac sodium  2 g Topical BID    amiodarone  100 mg Oral Daily    aspirin  81 mg Oral Daily    calcitRIOL  0.25 mcg Oral QPM    levothyroxine  88 mcg Oral QAM AC    metoprolol succinate  50 mg Oral Daily    sodium chloride flush  5-40 mL IntraVENous 2 times per day     Continuous Infusions:   sodium chloride      dextrose      sodium chloride       PRN Meds:nitroGLYCERIN, promethazine-codeine, diphenhydrAMINE, benzonatate, potassium chloride **OR** potassium alternative oral replacement **OR** potassium chloride, magnesium sulfate, sodium phosphate IVPB **OR** sodium phosphate IVPB **OR** sodium phosphate IVPB, melatonin, benzocaine-menthol, sodium chloride flush, sodium chloride, oxyCODONE,
DERIK hose applied.
Data- discharge order received, pt verbalized agreement to discharge, needs for [facility] for PT/OT/SLP and/or new Durable Medical Equipment through n/a for n/a, MAREN reviewed and signed by MD, to be completed by RN. Action- AVS prepared, discharge instructions prepared and given to pt and , medication information packet given r/t NEW or CHANGED prescriptions, pt verbalized understanding further self-review. D/C instruction summary: Diet- dysphagia/low NA, Activity- as tolerated, follow up with Primary Care Physician Vijay Soriano -[phone] appointment in 1 week, immunizations reviewed and n/a, medications prescriptions to be filled at [facility]. Inpatient surgical procedural reviewed: n/a. Contact information provided to above agencies used. Response- Case Management/ reported faxing completed MAREN and AVS to needed HHC/DME services stated above. Pt belongings gathered, IV removed, pt dressed in own clothes. Disposition is home with HHC/DME as stated above, transported with , taken to lobby via w/c with  , no complications. 1. WEIGHT: Admit Weight - Scale: 192 lb 6.4 oz (87.3 kg) (05/24/23 0800)        Today  Weight - Scale: 186 lb 6.4 oz (84.6 kg) (06/08/23 8135)       2.  O2 SAT.: SpO2: 95 % (06/08/23 1311)
Midline dressing changed.
[facility] Pulmonary/CCM Progress note      Admit Date: 5/23/2023    Chief Complaint: Shortness of breath    Subjective: Interval History: Patient feels that her chest congestion has improved, cough is still dry-unable to bring up phlegm. On 2 L O2.     Scheduled Meds:   torsemide  40 mg Oral BID    warfarin  2.5 mg Oral Once per day on Mon Fri Sat    And    warfarin  5 mg Oral Once per day on Sun Tue Wed Thu    piperacillin-tazobactam  3,375 mg IntraVENous Q8H    insulin glargine  33 Units SubCUTAneous QAM    insulin lispro  11 Units SubCUTAneous TID     insulin lispro  0-8 Units SubCUTAneous TID     insulin lispro  0-4 Units SubCUTAneous Nightly    ipratropium 0.5 mg-albuterol 2.5 mg  1 ampule Inhalation TID    methylPREDNISolone  40 mg IntraVENous Q6H    midodrine  5 mg Oral TID     [Held by provider] spironolactone  25 mg Oral BID    polyethylene glycol  17 g Oral Daily    sennosides-docusate sodium  2 tablet Oral Daily    metoclopramide  5 mg Oral BID     lidocaine 1 % injection  5 mL IntraDERmal Once    sodium chloride flush  5-40 mL IntraVENous 2 times per day    diclofenac sodium  2 g Topical BID    amiodarone  100 mg Oral Daily    aspirin  81 mg Oral Daily    calcitRIOL  0.25 mcg Oral QPM    levothyroxine  88 mcg Oral QAM AC    metoprolol succinate  50 mg Oral Daily    sodium chloride flush  5-40 mL IntraVENous 2 times per day     Continuous Infusions:   heparin (PORCINE) Infusion 11 Units/kg/hr (06/06/23 1003)    sodium chloride Stopped (06/06/23 0107)    dextrose      sodium chloride Stopped (06/05/23 1656)     PRN Meds:nitroGLYCERIN, promethazine-codeine, diphenhydrAMINE, benzonatate, potassium chloride **OR** potassium alternative oral replacement **OR** potassium chloride, magnesium sulfate, sodium phosphate IVPB **OR** sodium phosphate IVPB **OR** sodium phosphate IVPB, melatonin, benzocaine-menthol, sodium chloride flush, sodium chloride, oxyCODONE, ipratropium 0.5 mg-albuterol 2.5 mg, albuterol
Pharmacy to Dose Warfarin    Pharmacy consulted to dose warfarin for Afib. INR Goal: 2-3    INR today: 1.43    Assessment/Plan:  - INR subtherapeutic but starting to trend up on increased warfarin dose  - Will give home warfarin 2.5 mg tonight  - Also on SC heparin TID - will clarify but okay to continue until INR > 2  - Possible concomitant drug-drug interactions include: solu-medrol, amiodarone (stable dosing)     Pharmacy will continue to follow.     Skip Sears, PharmD, BCPS  Clinical Pharmacist  [id number]
Pharmacy to Dose Warfarin    Pharmacy consulted to dose warfarin for a fib. INR Goal: 2-3    INR today: 2.31    Assessment/Plan:  - INR now therapeutic  - Adjusted to 5 mg MWF, 2.5 mg all other days.   - Possible concomitant drug-drug interactions include: zosyn, steroids, levothyroxine, aspirin, amiodarone. Pharmacy will continue to follow.     Claudia Phelps, PharmD, [facility]  [id number]
Pharmacy to Dose Warfarin    Pharmacy consulted to dose warfarin for afib. INR Goal: 2-3    INR today: 1.84    Assessment/Plan:  - INR remains subtherapeutic   - Continue home regimen with 5 mg tonight. - Possible concomitant drug-drug interactions include: pip/tazo, methylprednisolone, levothyroxine, aspirin, amiodarone    Pharmacy will continue to follow.     Roldan Reyes, PharmD  PGY-1 Pharmacy Resident  [id number]
Pharmacy to Dose Warfarin    Pharmacy consulted to dose warfarin for afib. INR Goal: 2-3    INR today: 1.85    Assessment/Plan:  - Subtherapeutic but trending up given recent increased doses. - Continue home regimen with 5mg due today. - Possible concomitant drug-drug interactions include: pip/tazo, methylprednisolone, levothyroxine, aspirin, amiodarone  - Heparin drip has been ordered and administered. This is being clarified to determine indication and planned duration. Pharmacy will continue to follow.     Jodie Lux, PharmD, BCCCP
Pt states 8/10 chest pain after 1 nitro. BP 89/53. CNP notified.
Pt states chest pain 2/10 now, /72. Pt ok to discharge per CNP.
Pt walked ~ 100 ft. O2 sat=94% on room air while walking
Scripts for prednisone and spironolactone called into [facility].
Speech Language Pathology    [facility] tx attempted. SLP discussed pt with RN. RN reports pt discharging from facility at time of attempt. No charges filed at this time time. Thank you.     Shawn Zhao MA. [address] [facility] Pathologist  [phone]
[facility]   Nephrology progress Note  [phone]   [facility]. com           SUMMARY :  We are following this patient for CKD stage 1b /3   69year-old female with past medical history of HFrEF, CKD 3b, CAD s/p CABG, bioprosthetic mitral valve 2018, T2DM, hyperlipidemia, right leg cellulitis, presented to the hospital with shortness of breath nonproductive cough and swelling of the legs    SUBJECTIVE:   Patient progress reviewed. 5/29/23: Dyspnea+, swelling legs   5/30/23: O > I   5/31/23: O > I  6/1/23: O > I by 3 Lit, says her breathing is worse than yesterday, family by bedside  6/2/23: Cr up at 2.9 , held IV Furosemide @20ml/hr , discussed with patient and  as how to proceed. She says she would want the Fluid overload treated and have less dyspnea. She then said on being questioned that if it came to it , she would be okay doing HD ( she is familiar with this , her brother in law was on it )   6/3/23: feels okay. Breathing is stable but has persistent shortness of breath. BP still borderline low  6/4/23: feels okay. Breathing is better. Pulmonary PT is planned.  Weight up    -pt seen and examined  -PMSHx and meds reviewed  -family at bedside      No acute events ON  UO is good    ROS: neg CP/SOB    Physical Exam:    VITALS:  /68   Pulse (!) 108   Temp 96.9 °F (36.1 °C) (Temporal)   Resp 16   Ht 5' 6\" (1.676 m)   Wt 187 lb 14.4 oz (85.2 kg)   SpO2 96%   BMI 30.33 kg/m²   BLOOD PRESSURE RANGE:  Systolic (54UKT), RBT:433 , Min:104 , SMH:169   ; Diastolic (11MRB), EYU:99, Min:65, Max:73    24HR INTAKE/OUTPUT:    Intake/Output Summary (Last 24 hours) at 6/7/2023 0936  Last data filed at 6/7/2023 0446  Gross per 24 hour   Intake 254.01 ml   Output 1550 ml   Net -1295.99 ml         Gen:  alert, oriented x 3, NAD  CV: RRR, ESM Gr4/6 , id sternal scar CABG   Lungs:B/ L air entry, Normal breath sounds , bibasilar crackles , Rhonchi   Abd: soft, bowel sounds +
[facility] Pulmonary/CCM Progress note      Admit Date: 5/23/2023    Chief Complaint: Shortness of breath    Subjective: Interval History: Patient is actually doing quite well, on 2 L O2. Still has some cough and chest congestion. Plans for discharge home today.     Scheduled Meds:   warfarin  2.5 mg Oral Once per day on Sun Tue Thu Sat    And    [START ON 6/9/2023] warfarin  5 mg Oral Once per day on Mon Wed Fri    spironolactone  25 mg Oral Daily    predniSONE  20 mg Oral Daily    insulin glargine  20 Units SubCUTAneous QAM    insulin lispro  6 Units SubCUTAneous TID     torsemide  40 mg Oral BID    nystatin  5 mL Mouth/Throat 4x Daily    piperacillin-tazobactam  3,375 mg IntraVENous Q8H    insulin lispro  0-8 Units SubCUTAneous TID WC    insulin lispro  0-4 Units SubCUTAneous Nightly    ipratropium 0.5 mg-albuterol 2.5 mg  1 ampule Inhalation TID    midodrine  5 mg Oral TID WC    polyethylene glycol  17 g Oral Daily    sennosides-docusate sodium  2 tablet Oral Daily    metoclopramide  5 mg Oral BID     lidocaine 1 % injection  5 mL IntraDERmal Once    sodium chloride flush  5-40 mL IntraVENous 2 times per day    diclofenac sodium  2 g Topical BID    amiodarone  100 mg Oral Daily    aspirin  81 mg Oral Daily    calcitRIOL  0.25 mcg Oral QPM    levothyroxine  88 mcg Oral QAM AC    metoprolol succinate  50 mg Oral Daily    sodium chloride flush  5-40 mL IntraVENous 2 times per day     Continuous Infusions:   sodium chloride Stopped (06/08/23 0443)    dextrose      sodium chloride Stopped (06/05/23 1656)     PRN Meds:oxyCODONE, oxyCODONE, nitroGLYCERIN, promethazine-codeine, diphenhydrAMINE, benzonatate, potassium chloride **OR** potassium alternative oral replacement **OR** potassium chloride, magnesium sulfate, sodium phosphate IVPB **OR** sodium phosphate IVPB **OR** sodium phosphate IVPB, melatonin, benzocaine-menthol, sodium chloride flush, sodium chloride, ipratropium 0.5 mg-albuterol 2.5 mg, albuterol
[facility]    6/5/2023 8:27 AM        Name: Zoe Lockwood . Admitted: 5/23/2023  Primary Care Provider: Fred Donis MD (Tel: [phone])      Subjective:   Seen this am.   at bedside. Feeling ok, no new complaints. Started iv abx and solu medrol Friday after CT chest revealed B infiltrates. Symptoms due not seem impacted by any of the diureses. She is up 2lbs from yesterday (7 lbs in previous 48 hours). Patient is currently on 1L. She feels the cough is looser but still unable to expectorate phlegm. Chest vest was started yesterday. She does not wear oxygen at home.     Reviewed interval ancillary notes    Current Medications  warfarin (COUMADIN) tablet 2.5 mg, Once per day on Mon Fri Sat   And  [START ON 6/6/2023] warfarin (COUMADIN) tablet 5 mg, Once per day on Sun Tue Wed Thu  piperacillin-tazobactam (ZOSYN) 3,375 mg in sodium chloride 0.9 % 50 mL IVPB (mini-bag), Q8H  insulin glargine (LANTUS) injection vial 33 Units, QAM  insulin lispro (HUMALOG) injection vial 11 Units, TID WC  nitroGLYCERIN (NITROSTAT) SL tablet 0.4 mg, Q5 Min PRN  torsemide (DEMADEX) tablet 40 mg, Daily  insulin lispro (HUMALOG) injection vial 0-8 Units, TID WC  insulin lispro (HUMALOG) injection vial 0-4 Units, Nightly  heparin (porcine) injection 5,000 Units, 3 times per day  ipratropium 0.5 mg-albuterol 2.5 mg (DUONEB) nebulizer solution 1 ampule, TID  promethazine-codeine (PHENERGAN with CODEINE) 6.25-10 MG/5ML syrup 5 mL, Q4H PRN  methylPREDNISolone sodium succ (SOLU-MEDROL) injection 40 mg, Q6H  midodrine (PROAMATINE) tablet 5 mg, TID WC  diphenhydrAMINE (BENADRYL) tablet 25 mg, Nightly PRN  benzonatate (TESSALON) capsule 200 mg, Q8H PRN  potassium chloride (KLOR-CON M) extended release tablet 40 mEq, PRN   Or  potassium bicarb-citric acid (EFFER-K) effervescent tablet 40 mEq, PRN   Or  potassium chloride
Compensatory strategies:   Upright as possible with all PO intake , No straws , Small bites/sips , Swallow 2 times per bite , Eat/feed slowly, Remain upright 30-45 min , Cough/re-swallow     Assessment of Texture Tolerance:  Diet level prior to treatment: Dysphagia III Soft and bite sized , Mildly (nectar) thick liquids   Tolerance of Current Diet Level:Per chart, no noted difficulty with current diet level     Impressions: Pt was positioned Upright on the side of her bed , awake and alert with her  present. Currently on room air and pt demonstrates congested coughing prior to PO trials. Pt and pt's spouse report having several questions regarding her current diet recommendations but upon SLP's arrival, they had difficulty recalling. SLP provided the pt with pen and paper to promote improved recall for further ST sessions. SLP completed extensive education with the pt and pt's spouse to her MBSS results, that she'll need to be seen by ST to eventually repeat the instrumental swallow study in the future, her silent aspiration of thin liquids, dietary items to avoid upon return home (I.e. ice, ice cream and jello), available thickening options and where to purchase. Pt and pt's spouse report appreciation for education and request further handouts prior to discharge. Trials of mildly (nectar) thick liquids  and soft solids were provided to assess swallow function. The pt demonstrated mildly prolonged mastication of the soft solid but she was able to achieve full oral clearance. Following SLP's education to her recommended compensatory swallowing strategies including the double swallow, pt demonstrated appropriate carryover. The pt demonstrated congested coughing prior to PO trials and following completion of the items. Pt demonstrates increased risk for aspiration due to co morbidities , deconditioning , weak cough , and dysphagia as viewed on instrumental swallow assessment .  Based on today's assessment
Lungs: B/ L air entry, Normal breath sounds , bibasilar crackles , Rhonchi   Abd: soft, bowel sounds + , No organomegaly   Ext: 2+ leg edema, no cyanosis  Skin: Warm.   No rash  Neuro: no focal deficit       DATA:    CBC with Differential:    Lab Results   Component Value Date/Time    WBC 7.2 06/05/2023 05:57 AM    RBC 3.38 06/05/2023 05:57 AM    HGB 9.8 06/05/2023 05:57 AM    HCT 31.1 06/05/2023 05:57 AM     06/05/2023 05:57 AM    MCV 92.0 06/05/2023 05:57 AM    MCH 29.1 06/05/2023 05:57 AM    MCHC 31.6 06/05/2023 05:57 AM    RDW 18.2 06/05/2023 05:57 AM    SEGSPCT 64.1 09/02/2020 11:37 AM    BANDSPCT 3 01/30/2023 11:11 AM    LYMPHOPCT 3.5 06/05/2023 05:57 AM    MONOPCT 3.9 06/05/2023 05:57 AM    BASOPCT 0.0 06/05/2023 05:57 AM    MONOSABS 0.3 06/05/2023 05:57 AM    LYMPHSABS 0.3 06/05/2023 05:57 AM    EOSABS 0.0 06/05/2023 05:57 AM    BASOSABS 0.0 06/05/2023 05:57 AM     CMP:    Lab Results   Component Value Date/Time     06/06/2023 08:29 AM    K 3.5 06/06/2023 08:29 AM    K 3.8 06/05/2023 05:57 AM    CL 99 06/06/2023 08:29 AM    CO2 26 06/06/2023 08:29 AM    BUN 88 06/06/2023 08:29 AM    CREATININE 2.2 06/06/2023 08:29 AM    GFRAA 31 09/06/2022 08:49 AM    AGRATIO 0.9 06/02/2023 06:50 AM    LABGLOM 23 06/06/2023 08:29 AM    GLUCOSE 124 06/06/2023 08:29 AM    PROT 7.0 06/02/2023 06:50 AM    LABALBU 3.6 06/06/2023 08:29 AM    CALCIUM 8.7 06/06/2023 08:29 AM    BILITOT 0.5 06/02/2023 06:50 AM    ALKPHOS 86 06/02/2023 06:50 AM    AST 17 06/02/2023 06:50 AM    ALT 14 06/02/2023 06:50 AM     Phosphorus:    Lab Results   Component Value Date/Time    PHOS 4.8 06/06/2023 08:29 AM     Uric Acid:  No results found for: LABURIC, URICACID  Last 3 Troponin:    Lab Results   Component Value Date/Time    TROPONINI 0.04 01/03/2023 01:57 PM    TROPONINI 0.04 05/17/2022 10:22 PM    TROPONINI 0.06 05/17/2022 06:56 PM     U/A:    Lab Results   Component Value Date/Time    NITRITE neg 10/17/2022 09:15 AM    COLORU Yellow
-1156.95 ml      Wt Readings from Last 3 Encounters:   06/06/23 188 lb 14.4 oz (85.7 kg)   05/23/23 198 lb (89.8 kg)   05/11/23 206 lb (93.4 kg)       General appearance:  Appears comfortable  Eyes: Sclera clear. Pupils equal.  ENT: Moist oral mucosa. Trachea midline, no adenopathy. Cardiovascular: Regular rhythm, normal S1, S2. No murmur. + 1edema in lower extremities  Respiratory: Not using accessory muscles. Good inspiratory effort. Crackles noted on right   GI: Abdomen soft, no tenderness, not distended, normal bowel sounds  Musculoskeletal: No cyanosis in digits, neck supple  Neurology: CN 2-12 grossly intact. No speech or motor deficits  Psych: Normal affect. Alert and oriented in time, place and person  Skin: Warm, dry, normal turgor    Labs and Tests:  CBC:   Recent Labs     06/04/23  0340 06/05/23  0557   WBC 8.8 7.2   HGB 9.7* 9.8*    221     BMP:    Recent Labs     06/04/23  0340 06/05/23  0557   * 137   K 3.4* 3.8   CL 89* 97*   CO2 31 28   BUN 75* 80*   CREATININE 2.4* 2.3*   GLUCOSE 317* 194*     Hepatic: No results for input(s): AST, ALT, ALB, BILITOT, ALKPHOS in the last 72 hours. ECHO 5/25/23  Summary    Definity was used to assist with endocardial border delineation. Mild to moderate concentric left ventricular hypertrophy. Severely dilated    left ventricular cavity size. Moderate to severely diminished left    ventricular systolic function with an estimated ejection fraction of 30%. The left ventricular apex appears akinetic and aneurysmal without evidence    of thrombus with Definity. Global hypokinesis with more pronounced    hypokinesis of the anterior wall. Indeterminate diastolic function d/t mitral valve replacement. The right ventricle is severely dilated with moderately diminished systolic    function. Bi-atrial enlargement. The aortic valve is heavily calcified with reduced excursion.  Low-flow,    low-gradient critically severe aortic stenosis with a PV
05/12/2023 03:31 PM    WBCUA 2 05/12/2023 03:31 PM    RBCUA 0 05/12/2023 03:31 PM    YEAST neg 02/12/2021 09:45 AM    BACTERIA None Seen 05/12/2023 03:31 PM    CLARITYU Clear 05/12/2023 03:31 PM    SPECGRAV 1.012 05/12/2023 03:31 PM    LEUKOCYTESUR TRACE 05/12/2023 03:31 PM    UROBILINOGEN 1.0 05/12/2023 03:31 PM    BILIRUBINUR Negative 05/12/2023 03:31 PM    BILIRUBINUR neg 10/17/2022 09:15 AM    BLOODU Negative 05/12/2023 03:31 PM    GLUCOSEU Negative 05/12/2023 03:31 PM         IMPRESSION/RECOMMENDATIONS:      Diagnosis and Plan :   Hypokalemia: due to tubular wasting in the setting of loop diuretic use  -replaced  -aldactone added per cardiology  Plan:  -continue to monitor on torsemide    TORIBIO on CKD stage 3b:  -diminished perfusion in the setting of diuresis  -improved after holding diuretics, restarted due to hypervolemic as note on [employer]  -peak Cr 2.8-->2.2 today  -BUN stable  Plan:  -continue torsemide to 40mg bid   -continue aldactone    CKD stage 3b: follows with Dr. Pimentel Said HTN NS/DKD/HF/TORIBIO onC KD  -Cr baseline varies-likely high 1 range  -supportive care-torsemide for volume management    HFrEF:  -increased torsemide as noted above  -hold-restarted per cardiology    Hypokalemia   -replaced  -liberalize in diet    Aortic stenosis  see Dr Daylin Abdi note, high risk candidate for TAVR     Hyponatremia:  -resolved with diuresis        Eileen Good MD
6/7/2023)   Pt/family will demonstrate understanding of results Modified Barium Swallow Study, risk for aspiration, rationale for diet level and compensatory strategies (ongoing 6/7/2023)  Pt will demonstrate improved sensory motor function via targeted exercises and appropriate treatment modalities (ongoing 6/7/2023)  Pt will tolerate advance to least restrictive diet as evidenced by repeat instrumental swallow study (ongoing 6/7/2023)    Plan:   3-5 times per week during acute care stay. Patient/Family Education:  Provided education regarding role of SLP, recommendations and general speech pathology plan of care. [x] Pt verbalized understanding and agreement   [x] Pt requires ongoing learning   [] No evidence of comprehension     Discharge Recommendations:    Recommend ongoing SLP for dysphagia therapy upon discharge from hospital     Treatment time:  Timed Code Treatment Minutes: 0  Total Treatment time: 17 minutes    If patient discharges prior to next session this note will serve as a discharge summary. Signature:   [facility]  Speech-Language Pathology Graduate Clinician    The speech-language pathologist was present, directed the patient's care, made skilled judgment and was responsible for assessment and treatment.     Sina Lambert, [address] [facility]  Speech-Language Pathologist  [address] [phone]. [phone]
Home Health Aide needs for personal care     DME Required For Discharge: patient has all required DME for discharge, may need home O2    Precautions/Restrictions: high fall risk, defib  Weight Bearing Restrictions: no restrictions  [] Right Upper Extremity  [] Left Upper Extremity [] Right Lower Extremity  [] Left Lower Extremity     Required Braces/Orthotics: no braces required   [] Right  [] Left  Positional Restrictions:no positional restrictions     Pre-Admission Information   Lives With: spouse                  Type of Home: house  Home Layout: one level, full flight down to finished basement with HR on L  Home Access:  3 step to enter with (B) handrail + 1 step without rail. Bathroom Layout:  walk in tub  Bathroom Equipment:  Pt reports having a \"bench\" and grab bars for her bathtub, handheld shower head  Toilet Height: elevated height able  to push up from sink and walk-in tub  Home Equipment: rollator - 4 wheeled walker, transport chair  Transfer Assistance: Independent without use of device  Ambulation Assistance: amb without device- supervision from     ADL Assistance:  pt reports IND with ADLs,  assists with LB ADLs prn (pt denies requiring assistance for LB dressing this date 1/10)  IADL Assistance: Spouse does household chores  Active :        [x] Yes                 [] No  - but rarely drives, spouse does most driving  Current Employment: retired. Occupation: teacher  Hobbies: traveling  Recent Falls: 2 falls in last 6 months, one she slide out of her chair, other she fell when getting up from chair. Subjective  General: Patient sitting in chair, agreeable to OT.  Spouse present and supportive   Pain: 0/10  Pain Interventions: not applicable        Activities of Daily Living  Basic Activities of Daily Living  Upper Extremity Dressing: setup assistance stand by assistance  General Comments: Patient changed shirt upon OT arrival. Patient I dressed LB, has been
PHUR 7.0 05/12/2023 03:31 PM    WBCUA 2 05/12/2023 03:31 PM    RBCUA 0 05/12/2023 03:31 PM    YEAST neg 02/12/2021 09:45 AM    BACTERIA None Seen 05/12/2023 03:31 PM    CLARITYU Clear 05/12/2023 03:31 PM    SPECGRAV 1.012 05/12/2023 03:31 PM    LEUKOCYTESUR TRACE 05/12/2023 03:31 PM    UROBILINOGEN 1.0 05/12/2023 03:31 PM    BILIRUBINUR Negative 05/12/2023 03:31 PM    BILIRUBINUR neg 10/17/2022 09:15 AM    BLOODU Negative 05/12/2023 03:31 PM    GLUCOSEU Negative 05/12/2023 03:31 PM         IMPRESSION/RECOMMENDATIONS:      Diagnosis and Plan :     TORIBIO on CKD stage 3b:  -diminished perfusion in the setting of diuresis  -improved after holding lasix  -peak Cr 2.8-->2.3 today  Plan:  -OK to resume torsemide 40mg at discharge    CKD stage 3b: follows with Dr. Milton Garnett HTN NS/DKD/HF/TORIBIO onC KD  -Cr baseline varies-likely high 1 range  -supportive care    HFrEF:  -diuretics on hold due to recurrent TORIBIO on CKD  -can resume torsemide at discharge  -TAVR being planned     Hypokalemia   -replaced, resolved    Aortic stenosis  see Dr Jesi Hanna note, high risk candidate for TAVR     Hyponatremia:  -stable-hypovolemic due to diuresis, improved after diuretics held  -OK to resume torsemide at discharge      Jerald Auguste MD
Regular Solid   1) Material does not enter the airway            Esophageal Phase  Unremarkable    Following Evaluation:  Provided education regarding role of SLP, results of assessment, recommendations and general speech pathology plan of care.    [x] Pt verbalized understanding and agreement   [x] Pt requires ongoing learning   [] No evidence of comprehension     Timed Code Treatment: 0 min    Total Treatment Time: 60 min    Armando Gandhi [id number]
Summary (Last 24 hours) at 6/7/2023 0807  Last data filed at 6/7/2023 0446  Gross per 24 hour   Intake 274.01 ml   Output 1550 ml   Net -1275.99 ml        Wt Readings from Last 3 Encounters:   06/07/23 187 lb 14.4 oz (85.2 kg)   05/23/23 198 lb (89.8 kg)   05/11/23 206 lb (93.4 kg)       General appearance:  Appears comfortable, looks better today   Eyes: Sclera clear. Pupils equal.  ENT: Moist oral mucosa. Trachea midline, no adenopathy. Cardiovascular: Regular rhythm, normal S1, S2. No murmur. + 1 edema in lower extremities  Respiratory: Not using accessory muscles. Good inspiratory effort. Rare wheezes noted   GI: Abdomen soft, no tenderness, not distended, normal bowel sounds  Musculoskeletal: No cyanosis in digits, neck supple  Neurology: CN 2-12 grossly intact. No speech or motor deficits  Psych: Normal affect. Alert and oriented in time, place and person  Skin: Warm, dry, normal turgor    Labs and Tests:  CBC:   Recent Labs     06/05/23  0557   WBC 7.2   HGB 9.8*          BMP:    Recent Labs     06/05/23  0557 06/06/23  0829    139   K 3.8 3.5   CL 97* 99   CO2 28 26   BUN 80* 88*   CREATININE 2.3* 2.2*   GLUCOSE 194* 124*       Hepatic: No results for input(s): AST, ALT, ALB, BILITOT, ALKPHOS in the last 72 hours. ECHO 5/25/23  Summary    Definity was used to assist with endocardial border delineation. Mild to moderate concentric left ventricular hypertrophy. Severely dilated    left ventricular cavity size. Moderate to severely diminished left    ventricular systolic function with an estimated ejection fraction of 30%. The left ventricular apex appears akinetic and aneurysmal without evidence    of thrombus with Definity. Global hypokinesis with more pronounced    hypokinesis of the anterior wall. Indeterminate diastolic function d/t mitral valve replacement. The right ventricle is severely dilated with moderately diminished systolic    function. Bi-atrial enlargement.     The
pulmonary    Plan:    Continue midodrine to 5 mg three times a day  Continue torsemide to 40 mg twice a day   Wear support stockings  Palliative care is following  Coumadin per pharmacy  Continue toprol 50 mg daily  Continue aldactone 25 mg once a day  Pulmonary following  Will add bnp to blood done this morning    Declines any further workup for aortic stenosis    Ok for discharge today  Meds done on discharge   Will do weekly cbc, bnp and bmp with home care  Follow up next week    Discussed with hospitalist    NYHA IV    Discussed with patient who is agreeable with plan of care. Thank you for allowing me to participate in the care of your patient.     Warden Case, APRN - CNS, CNS
pulmonary    Plan:    Continue midodrine to 5 mg three times a day  Continue torsemide to 40 mg twice a day   Wear support stockings  Palliative care is following  Coumadin per pharmacy  Continue toprol 50 mg daily  Will resume aldactone 25 mg once a day (she had been on 25 mg twice a day)   Pulmonary following    Declines any further workup for aortic stenosis    Will benefit with home care at discharge  Home when ok with pulmonary    NYHA IV    Discussed with patient who is agreeable with plan of care. Thank you for allowing me to participate in the care of your patient.     Sabrina Yen, APRN - CNS, CNS
pulmonary    Plan:    Continue midodrine to 5 mg three times a day  Will increase torsemide to 40 mg twice a day discussed with nephrology  Wear support stockings  Palliative care is following  Coumadin per pharmacy  Continue toprol 50 mg daily  Hold aldactone and metolazone for now  Pulmonary following    Declines any further workup for aortic stenosis    Will benefit with home care at discharge  Hopefully home tomorrow      NYHA IV    Discussed with patient who is agreeable with plan of care. Thank you for allowing me to participate in the care of your patient.     SOLEDAD Elizabeth - CNS, CNS

## 2023-06-08 NOTE — CARE COORDINATION
Case Management -  Discharge Note      Patient Name: Alfonso Rodriguez                   YOB: 1945            Readmission Risk (Low < 19, Mod (19-27), High > 27): Readmission Risk Score: 23    Current PCP: Katelyn Sim MD    ([facility]) Important Message from Medicare:    Date: 6/7/23    PT AM-PAC: 25 /24  OT AM-PAC: 22 /24    Patient/patient representative has been educated on the benefits of [address]  as well as the possible risks of declining recommended services. Patient/patient representative has acknowledged the information provided and decided on the following discharge plan. Patient/ patient representative has been provided freedom of choice regarding service provider, supported by basic dialogue that supports the patient's individualized plan of care/goals. Home Care Information:   Home Care Agency:   [facility],   [place], [address]  Phone: [phone]  Fax: [phone]             Services: Skilled Nursing, PT, OT & Speech ( SLP)  Home Health Order Obtained: Yes    Home health agency notified of discharge. Fabiana Weiss in intake    Financial    Payor: Rehana Diaz / Plan: Cintia Mir PPO / Product Type: Medicare /     Pharmacy:  Potential assistance Purchasing Medications:    Meds-to-Beds request: Yes      [address], [address] [phone] - f [phone]  [address] [place] [address]  Phone: [phone] Fax: [phone]    [facility]/pharmacy [address] [place] [address], [address] [place] [address] [phone] - f [phone]  [address] [place] [address] [address]. Nikki L[place] [address]  Phone: [phone] Fax: [phone]      Notes: Additional Case Management Notes:   Start of care will be on 6/9/2023 as per Fabiana Weiss   the agency's  intake liaison.

## 2023-06-08 NOTE — DISCHARGE INSTR - COC
COLONOSCOPY  01/17/2014    Dr. Carlee Brenner - sigmoid diverticulosis, polypectomies x3, internal hemorrhoids    COLONOSCOPY  10/10/2018    w/biopsy performed by Rickey Wayne MD at [place] [address] N/A 08/07/2020    COLONOSCOPY DIAGNOSTIC performed by Craig Patterson MD at [address] [place] [address]  08/21/2018    Dr. Daniel Whiteside - x3 (LIMA-LAD, L SV-D1-PLV) modified BL MAZE procedure w/obliteration of WAYNE using 45mm AtriClip    INSERTABLE CARDIAC MONITOR Left 08/16/2018    Dr. Billie Carney SN# [id number] [employer]    IR KYPHOPLASTY THORACIC FIRST LEVEL  12/08/2022    IR KYPHOPLASTY THORACIC FIRST LEVEL 12/8/2022 [facility] SPECIAL PROCEDURES    MITRAL VALVE REPLACEMENT  08/21/2018    Dr. Daniel Whiteside - 27mm Medtronic Cinch tissue valve    PAIN MANAGEMENT PROCEDURE N/A 12/18/2020    C6-C7 MIDLINE  EPIDURAL STEROID INJECTION WITH FLUOROSCOPY performed by Romero Hooper MD at [facility] Bilateral 01/18/2021    BILATERAL T11 TRANSFORAMINAL EPIDURAL STEROID INJECTION WITH FLUOROSCOPY [facility]      TRANSESOPHAGEAL ECHOCARDIOGRAM  08/21/2018    during CABG/MVR    TUNNELED VENOUS CATHETER PLACEMENT Left 08/23/2018    Dr. Brian Dinh -  for HD---since removed    UPPER GASTROINTESTINAL ENDOSCOPY N/A 10/10/2018    w/biopsy performed by Rickey Wayne MD at [address] [place] [address] LEFT Left 04/20/2023    US BREAST BIOPSY [place] LEFT 4/20/2023 [facility] ULTRASOUND       Immunization History:   Immunization History   Administered Date(s) Administered    COVID-19, MODERNA BLUE border, Primary or Immunocompromised, (age 12y+), IM, 100 mcg/0.5mL 01/31/2021, 03/15/2021, 03/15/2022    COVID-19, MODERNA Bivalent, (age 12y+), IM, 48 mcg/0.5 mL 10/27/2022    Influenza Virus Vaccine 09/26/2012, 12/15/2014, 12/16/2015, 12/27/2016    Influenza Whole 09/26/2012    Influenza, AFLURIA (age 1

## 2023-06-08 NOTE — DISCHARGE SUMMARY
[facility] DISCHARGE SUMMARY    Patient Demographics    Patient. Vivian Torres  Date of Birth. 1945  MRN. [id number]     Primary care provider. Kathryn Taveras MD  (Tel: [phone])    Admit date: 5/23/2023    Discharge date 6/8/2023  Note Date: 6/8/2023     Reason for Hospitalization. Chief Complaint   Patient presents with    Shortness of Breath     Pt sent over from MD Office for sob, productive cough, leg swelling         Significant Findings. Principal Problem:    Pneumonia of both lower lobes due to infectious organism  Active Problems:    PAF (paroxysmal atrial fibrillation) ([facility])    Coronary artery disease    Mitral valve insufficiency    Chronic HFrEF (heart failure with reduced ejection fraction) ([facility])    Severe aortic stenosis    Acute systolic congestive heart failure ([facility])    Stage 3 chronic kidney disease ([facility])    Acute kidney injury superimposed on chronic kidney disease ([address]    ICD (implantable cardioverter-defibrillator) in place    Acute on chronic congestive heart failure ([facility])    Elevated troponin    Anemia    Chronic atrial fibrillation ([facility])    Acute respiratory failure with hypoxia ([facility])    Moderate persistent asthma with exacerbation  Resolved Problems:    * No resolved hospital problems. *       Problems and results from this hospitalization that need follow up. Follow up with cardiology , weight 186 on day of d/c. Creat 2.2 / BUN 86  INR 2.3     Significant test results and incidental findings. Chest xray 6/7/23   IMPRESSION:  1. Marginal inspiration, without evidence of CHF  2.  Near complete interval resolution of the previously noted atelectasis  within the right lung base    [facility] 5/3/2021 Dr Johnie oHllins  Artery Findings/Result   LM Normal   LAD 50% mid, 70% mid, competitive flow distal from LIMA   Cx OM1 20% ostial to prox, superior branch mid OM1 50% ostial   RI N/A

## 2023-06-08 NOTE — RT PROTOCOL NOTE
RT Inhaler-Nebulizer Bronchodilator Protocol Note    There is a bronchodilator order in the chart from a provider indicating to follow the RT Bronchodilator Protocol and there is an Initiate RT Inhaler-Nebulizer Bronchodilator Protocol order as well (see protocol at bottom of note). CXR Findings:  XR CHEST PORTABLE    Result Date: 6/6/2023  1. Marginal inspiration, without evidence of CHF 2. Near complete interval resolution of the previously noted atelectasis within the right lung base       The findings from the last RT Protocol Assessment were as follows:   History Pulmonary Disease: Chronic pulmonary disease  Respiratory Pattern: Regular pattern and RR 12-20 bpm  Breath Sounds: Inspiratory and expiratory or bilateral wheezing and/or rhonchi  Cough: Strong, spontaneous, non-productive  Indication for Bronchodilator Therapy: Wheezing associated with pulm disorder  Bronchodilator Assessment Score: 8    Aerosolized bronchodilator medication orders have been revised according to the RT Inhaler-Nebulizer Bronchodilator Protocol below. Respiratory Therapist to perform RT Therapy Protocol Assessment initially then follow the protocol. Repeat RT Therapy Protocol Assessment PRN for score 0-3 or on second treatment, BID, and PRN for scores above 3. No Indications - adjust the frequency to every 6 hours PRN wheezing or bronchospasm, if no treatments needed after 48 hours then discontinue using Per Protocol order mode. If indication present, adjust the RT bronchodilator orders based on the Bronchodilator Assessment Score as indicated below. Use Inhaler orders unless patient has one or more of the following: on home nebulizer, not able to hold breath for 10 seconds, is not alert and oriented, cannot activate and use MDI correctly, or respiratory rate 25 breaths per minute or more, then use the equivalent nebulizer order(s) with same Frequency and PRN reasons based on the score.   If a patient is on this

## 2023-06-08 NOTE — FLOWSHEET NOTE
06/08/23 1247   Pain Assessment   Pain Assessment 0-10   Pain Level 9   Pain Location Chest   Pain Orientation Mid   Pain Descriptors Squeezing     Pt given 1 nitro, will cont to monitor.

## 2023-06-09 ENCOUNTER — TELEPHONE (OUTPATIENT)
Dept: OTHER | Age: 78
End: 2023-06-09

## 2023-06-09 ENCOUNTER — CARE COORDINATION (OUTPATIENT)
Dept: CASE MANAGEMENT | Age: 78
End: 2023-06-09

## 2023-06-09 DIAGNOSIS — J18.9 PNEUMONIA OF BOTH LOWER LOBES DUE TO INFECTIOUS ORGANISM: Primary | ICD-10-CM

## 2023-06-09 PROCEDURE — 1111F DSCHRG MED/CURRENT MED MERGE: CPT | Performed by: FAMILY MEDICINE

## 2023-06-09 NOTE — CARE COORDINATION
Cardio MMA       Care Transition Nurse provided contact information. Plan for follow-up call in 5-7 days based on severity of symptoms and risk factors.   Plan for next call: self management-PNA, HC, f/u appmickey, RPM    Rosalia Milian RN

## 2023-06-09 NOTE — TELEPHONE ENCOUNTER
[facility] FAILURE PROGRAM  TELEPHONE ENCOUNTER FORM    Dee Lynne 1945    ASSESSMENT:   Baseline weight: 184.4 lbs  Current weight: 184.4 lbs  Patient weighing daily: Yes  What are your symptoms of heart failure: dyspnea, edema  Are you having any symptoms:  No  Patient knows who to call with symptoms: Yes  Diet history:      Patient states sodium limitation is : 3G      Patient states fluid limitation is 64 oz  Patient following diet as instructed: Yes  Medication history: taking medications as instructed Yes; medication side effects noted No  Patient is being active at home: Yes  Patient knows date and time of follow up appointment: Yes   Patient has transportation to appointments: Yes    RECOMMENDATIONS:   Medication: taking as prescribed  Diet: no added salt diet  MD/ Clinic appointment: 6/12 with PCP 6/13 with HF, 6/14 with nephrology  Other: Patient doing well. Home care out to see patient today. Encouraged patient to call with any questions or concerns.

## 2023-06-12 PROBLEM — J45.41 MODERATE PERSISTENT ASTHMA WITH EXACERBATION: Status: RESOLVED | Noted: 2023-06-02 | Resolved: 2023-06-12

## 2023-06-12 PROBLEM — J96.01 ACUTE RESPIRATORY FAILURE WITH HYPOXIA (HCC): Status: RESOLVED | Noted: 2023-06-02 | Resolved: 2023-06-12

## 2023-06-12 PROBLEM — K56.7 ILEUS (HCC): Status: RESOLVED | Noted: 2023-01-10 | Resolved: 2023-06-12

## 2023-06-20 ENCOUNTER — TELEPHONE (OUTPATIENT)
Dept: FAMILY MEDICINE CLINIC | Age: 78
End: 2023-06-20

## 2023-06-20 ENCOUNTER — TELEPHONE (OUTPATIENT)
Dept: CARDIOLOGY CLINIC | Age: 78
End: 2023-06-20

## 2023-06-20 NOTE — TELEPHONE ENCOUNTER
Blood work 6/19/2023  Creat 2.0 bun 48 potassium 3.6 bnp 395  All great and improved  Call and see how she is doing  I am very pleased with the blood work

## 2023-06-20 NOTE — TELEPHONE ENCOUNTER
Tried to reach patient just rang and rang no answer will try later  Spoke to patient she is getting better day by day.  She has PT at home and gaining her strength back

## 2023-06-22 ENCOUNTER — PATIENT MESSAGE (OUTPATIENT)
Dept: FAMILY MEDICINE CLINIC | Age: 78
End: 2023-06-22

## 2023-06-22 DIAGNOSIS — K11.20 PAROTIDITIS: Primary | ICD-10-CM

## 2023-06-22 RX ORDER — AMOXICILLIN AND CLAVULANATE POTASSIUM 875; 125 MG/1; MG/1
1 TABLET, FILM COATED ORAL 2 TIMES DAILY
Qty: 14 TABLET | Refills: 0 | Status: SHIPPED | OUTPATIENT
Start: 2023-06-22 | End: 2023-06-29

## 2023-06-22 NOTE — TELEPHONE ENCOUNTER
From: [facility]  To: Dr. Sae Napier: 2023 9:10 AM EDT  Subject: Lower jaw swollen    Mom woke up with her left side lower jaw swollen again. I am certain you have seen how she does this in the past. Hard knot on the side of the jawline. Dad ask that I send a quick message to see if you could send her in an antibiotic?  Thank you    Please let us know  [phone]

## 2023-06-22 NOTE — TELEPHONE ENCOUNTER
Made follow up phone call to patient. Reports concerns for left sided jaw discomfort that started about 3-4 days prior - reports area is tender and swollen. Has not been taking any OTC medication for symptom relief. This has happened before - was a few years prior. Discussed possible parotid gland inflammation/infection. Will start on Augmentin. Patient does have an INR scheduled for today - no warfarin dosing for now, may need to. Advised to check INR every week instead of every two weeks.

## 2023-06-23 ENCOUNTER — CARE COORDINATION (OUTPATIENT)
Dept: CASE MANAGEMENT | Age: 78
End: 2023-06-23

## 2023-06-23 PROBLEM — R77.8 ELEVATED TROPONIN: Status: RESOLVED | Noted: 2023-05-24 | Resolved: 2023-06-23

## 2023-06-23 PROBLEM — R79.89 ELEVATED TROPONIN: Status: RESOLVED | Noted: 2023-05-24 | Resolved: 2023-06-23

## 2023-06-26 ENCOUNTER — TELEPHONE (OUTPATIENT)
Dept: FAMILY MEDICINE CLINIC | Age: 78
End: 2023-06-26

## 2023-06-26 ENCOUNTER — ANTI-COAG VISIT (OUTPATIENT)
Dept: FAMILY MEDICINE CLINIC | Age: 78
End: 2023-06-26

## 2023-06-26 ENCOUNTER — HOSPITAL ENCOUNTER (OUTPATIENT)
Age: 78
Setting detail: SPECIMEN
Discharge: HOME | End: 2023-06-26
Payer: MEDICARE

## 2023-06-26 ENCOUNTER — NURSE ONLY (OUTPATIENT)
Dept: CARDIOLOGY CLINIC | Age: 78
End: 2023-06-26
Payer: MEDICARE

## 2023-06-26 DIAGNOSIS — Z95.810 ICD (IMPLANTABLE CARDIOVERTER-DEFIBRILLATOR) IN PLACE: ICD-10-CM

## 2023-06-26 DIAGNOSIS — I50.22 CHRONIC SYSTOLIC HF (HEART FAILURE) (HCC): ICD-10-CM

## 2023-06-26 DIAGNOSIS — I42.9 CARDIOMYOPATHY, UNSPECIFIED TYPE (HCC): Primary | ICD-10-CM

## 2023-06-26 LAB
ANION GAP SERPL CALCULATED.3IONS-SCNC: 12 MMOL/L (ref 3–16)
BASOPHILS # BLD: 0 K/UL (ref 0–0.2)
BASOPHILS NFR BLD: 0.6 %
BUN SERPL-MCNC: 35 MG/DL (ref 7–20)
CALCIUM SERPL-MCNC: 8.8 MG/DL (ref 8.3–10.6)
CHLORIDE SERPL-SCNC: 99 MMOL/L (ref 99–110)
CO2 SERPL-SCNC: 26 MMOL/L (ref 21–32)
CREAT SERPL-MCNC: 1.7 MG/DL (ref 0.6–1.2)
DEPRECATED RDW RBC AUTO: 18.3 % (ref 12.4–15.4)
EOSINOPHIL # BLD: 0.1 K/UL (ref 0–0.6)
EOSINOPHIL NFR BLD: 1.4 %
GFR SERPLBLD CREATININE-BSD FMLA CKD-EPI: 31 ML/MIN/{1.73_M2}
GLUCOSE SERPL-MCNC: 156 MG/DL (ref 70–99)
HCT VFR BLD AUTO: 31.3 % (ref 36–48)
HGB BLD-MCNC: 9.8 G/DL (ref 12–16)
INR BLD: 5.4
LYMPHOCYTES # BLD: 0.7 K/UL (ref 1–5.1)
LYMPHOCYTES NFR BLD: 8.7 %
MCH RBC QN AUTO: 29.2 PG (ref 26–34)
MCHC RBC AUTO-ENTMCNC: 31.3 G/DL (ref 31–36)
MCV RBC AUTO: 93.2 FL (ref 80–100)
MONOCYTES # BLD: 0.8 K/UL (ref 0–1.3)
MONOCYTES NFR BLD: 9.5 %
NEUTROPHILS # BLD: 6.5 K/UL (ref 1.7–7.7)
NEUTROPHILS NFR BLD: 79.8 %
NT-PROBNP SERPL-MCNC: ABNORMAL PG/ML (ref 0–449)
PLATELET # BLD AUTO: ABNORMAL K/UL (ref 135–450)
PLATELET BLD QL SMEAR: ABNORMAL
PMV BLD AUTO: ABNORMAL FL (ref 5–10.5)
POTASSIUM SERPL-SCNC: 4.2 MMOL/L (ref 3.5–5.1)
RBC # BLD AUTO: 3.36 M/UL (ref 4–5.2)
SLIDE REVIEW: ABNORMAL
SODIUM SERPL-SCNC: 137 MMOL/L (ref 136–145)
WBC # BLD AUTO: 8.1 K/UL (ref 4–11)

## 2023-06-26 PROCEDURE — 80048 BASIC METABOLIC PNL TOTAL CA: CPT

## 2023-06-26 PROCEDURE — 83880 ASSAY OF NATRIURETIC PEPTIDE: CPT

## 2023-06-26 PROCEDURE — 85025 COMPLETE CBC W/AUTO DIFF WBC: CPT

## 2023-06-26 PROCEDURE — 93297 REM INTERROG DEV EVAL ICPMS: CPT | Performed by: CLINICAL NURSE SPECIALIST

## 2023-06-26 PROCEDURE — G2066 INTER DEVC REMOTE 30D: HCPCS | Performed by: CLINICAL NURSE SPECIALIST

## 2023-06-26 PROCEDURE — 36415 COLL VENOUS BLD VENIPUNCTURE: CPT

## 2023-06-27 ENCOUNTER — CARE COORDINATION (OUTPATIENT)
Dept: CASE MANAGEMENT | Age: 78
End: 2023-06-27

## 2023-06-27 ENCOUNTER — OFFICE VISIT (OUTPATIENT)
Dept: CARDIOLOGY CLINIC | Age: 78
End: 2023-06-27
Payer: MEDICARE

## 2023-06-27 VITALS
OXYGEN SATURATION: 93 % | HEIGHT: 66 IN | DIASTOLIC BLOOD PRESSURE: 60 MMHG | BODY MASS INDEX: 30.05 KG/M2 | HEART RATE: 61 BPM | SYSTOLIC BLOOD PRESSURE: 114 MMHG | WEIGHT: 187 LBS

## 2023-06-27 DIAGNOSIS — N28.9 RENAL INSUFFICIENCY: ICD-10-CM

## 2023-06-27 DIAGNOSIS — I35.0 NONRHEUMATIC AORTIC VALVE STENOSIS: ICD-10-CM

## 2023-06-27 DIAGNOSIS — I95.9 HYPOTENSION, UNSPECIFIED HYPOTENSION TYPE: ICD-10-CM

## 2023-06-27 DIAGNOSIS — I50.22 CHRONIC SYSTOLIC HF (HEART FAILURE) (HCC): Primary | ICD-10-CM

## 2023-06-27 DIAGNOSIS — I48.0 PAF (PAROXYSMAL ATRIAL FIBRILLATION) (HCC): ICD-10-CM

## 2023-06-27 DIAGNOSIS — Z95.810 ICD (IMPLANTABLE CARDIOVERTER-DEFIBRILLATOR), DUAL, IN SITU: ICD-10-CM

## 2023-06-27 PROCEDURE — 99214 OFFICE O/P EST MOD 30 MIN: CPT | Performed by: CLINICAL NURSE SPECIALIST

## 2023-06-27 PROCEDURE — 3078F DIAST BP <80 MM HG: CPT | Performed by: CLINICAL NURSE SPECIALIST

## 2023-06-27 PROCEDURE — 1123F ACP DISCUSS/DSCN MKR DOCD: CPT | Performed by: CLINICAL NURSE SPECIALIST

## 2023-06-27 PROCEDURE — 3074F SYST BP LT 130 MM HG: CPT | Performed by: CLINICAL NURSE SPECIALIST

## 2023-06-29 NOTE — DISCHARGE INSTRUCTIONS
[facility] DISCHARGE INSTRUCTIONS    No driving for 24 hours. We strongly recommend that a responsible adult stay with you for the next 24 hours. Continue Coumadin. Hydrocortisone 1% cream to reddened areas as needed.         Phone: [phone]
declines

## 2023-06-30 ENCOUNTER — CARE COORDINATION (OUTPATIENT)
Dept: CASE MANAGEMENT | Age: 78
End: 2023-06-30

## 2023-06-30 ENCOUNTER — TELEPHONE (OUTPATIENT)
Dept: CARDIOLOGY CLINIC | Age: 78
End: 2023-06-30

## 2023-07-03 NOTE — TELEPHONE ENCOUNTER
Called and talked to pt she states she wanted to keep her appt with MXA. Contacted THU-RN and it was ok'd to leave appt where it is.

## 2023-07-03 NOTE — PROGRESS NOTES
This report was transcribed using voice recognition software. Every effort was made to ensure accuracy, however, inadvertent computerized transcription errors may be present.      Kalee Fleming MD, [facility]   Office: [phone]  Fax: [phone]

## 2023-07-05 ENCOUNTER — CARE COORDINATION (OUTPATIENT)
Dept: CASE MANAGEMENT | Age: 78
End: 2023-07-05

## 2023-07-05 ENCOUNTER — TELEPHONE (OUTPATIENT)
Dept: FAMILY MEDICINE CLINIC | Age: 78
End: 2023-07-05

## 2023-07-05 NOTE — PROGRESS NOTES
Patient comes in for programming evaluation for her defibrillator. Patient has Medtronic [id number] Evera MRI XT DR-7/7/2021  Hx: Ischemic Cardiomyopathy/Chronic Systolic HF, Persistent atrial Fibrillation    Some atrial undersensing noted with AF. P wave 0.6 mV today in office. All sensing and pacing parameters are within normal range. Battery life 9.4 years  AP 0.1%.  0.5%. AF with 100% burden. In current Episode since 6/11/2023  Patient remains on warfarin, amiodarone and metoprolol. Changed ALL pathways per Medtronic FCA all pathways  B >AX  Increased Atrial sensitivity today to 0.15 mV and turned atrial therapies to the [address] settings. Please see interrogation for more detail. Optivol is recently elevated. WNL today. Patient will see Dr. Araujo Headings today and follow up in 3 months in office or remotely.

## 2023-07-05 NOTE — CARE COORDINATION
condition-PNA, chf, htn  Interventions to address risk factors: Education of patient/family/caregiver/guardian to support self-management-     Offered patient enrollment in the Remote Patient Monitoring (RPM) program for in-home monitoring: NA.     Care Transitions Subsequent and Final Call    Subsequent and Final Calls  Do you have any ongoing symptoms?: No  Have your medications changed?: No  Do you have any questions related to your medications?: No  Do you currently have any active services?: Yes  Are you currently active with any services?: Home Health  Do you have any needs or concerns that I can assist you with?: No  Identified Barriers: None  Care Transitions Interventions  Other Interventions:             LPN Care Coordinator provided contact information for future needs. No further follow-up call indicated based on severity of symptoms and risk factors.       Sonya Rodriguez LPN  Care Coordinator  [phone]

## 2023-07-05 NOTE — TELEPHONE ENCOUNTER
Elfego Ann with PT at [facility] called and states that she has Dc'd Pt from PT with all goals met. Doing much better. Please call [phone] with any questions.

## 2023-07-06 ENCOUNTER — NURSE ONLY (OUTPATIENT)
Dept: CARDIOLOGY CLINIC | Age: 78
End: 2023-07-06
Payer: MEDICARE

## 2023-07-06 ENCOUNTER — OFFICE VISIT (OUTPATIENT)
Dept: CARDIOLOGY CLINIC | Age: 78
End: 2023-07-06
Payer: MEDICARE

## 2023-07-06 ENCOUNTER — PATIENT MESSAGE (OUTPATIENT)
Dept: CARDIOLOGY CLINIC | Age: 78
End: 2023-07-06

## 2023-07-06 VITALS
DIASTOLIC BLOOD PRESSURE: 64 MMHG | WEIGHT: 185 LBS | HEIGHT: 66 IN | SYSTOLIC BLOOD PRESSURE: 100 MMHG | HEART RATE: 65 BPM | BODY MASS INDEX: 29.73 KG/M2 | OXYGEN SATURATION: 92 %

## 2023-07-06 DIAGNOSIS — I25.5 ISCHEMIC CARDIOMYOPATHY: ICD-10-CM

## 2023-07-06 DIAGNOSIS — Z95.810 ICD (IMPLANTABLE CARDIOVERTER-DEFIBRILLATOR), DUAL, IN SITU: ICD-10-CM

## 2023-07-06 DIAGNOSIS — I48.21 PERMANENT ATRIAL FIBRILLATION (HCC): Primary | ICD-10-CM

## 2023-07-06 DIAGNOSIS — Z95.810 ICD (IMPLANTABLE CARDIOVERTER-DEFIBRILLATOR) IN PLACE: Primary | ICD-10-CM

## 2023-07-06 DIAGNOSIS — I50.32 CHRONIC DIASTOLIC HEART FAILURE (HCC): ICD-10-CM

## 2023-07-06 DIAGNOSIS — E66.9 OBESITY, CLASS I, BMI 30-34.9: ICD-10-CM

## 2023-07-06 DIAGNOSIS — I48.0 PAF (PAROXYSMAL ATRIAL FIBRILLATION) (HCC): ICD-10-CM

## 2023-07-06 DIAGNOSIS — I25.10 CORONARY ARTERY DISEASE INVOLVING NATIVE CORONARY ARTERY OF NATIVE HEART WITHOUT ANGINA PECTORIS: ICD-10-CM

## 2023-07-06 DIAGNOSIS — I35.0 AORTIC VALVE STENOSIS, SEVERE: ICD-10-CM

## 2023-07-06 DIAGNOSIS — I82.90 ACUTE DEEP VEIN THROMBOSIS (DVT) OF NON-EXTREMITY VEIN: ICD-10-CM

## 2023-07-06 DIAGNOSIS — I50.22 CHRONIC HFREF (HEART FAILURE WITH REDUCED EJECTION FRACTION) (HCC): ICD-10-CM

## 2023-07-06 DIAGNOSIS — I48.20 CHRONIC ATRIAL FIBRILLATION (HCC): ICD-10-CM

## 2023-07-06 DIAGNOSIS — I50.22 CHRONIC SYSTOLIC CHF (CONGESTIVE HEART FAILURE), NYHA CLASS 3 (HCC): ICD-10-CM

## 2023-07-06 DIAGNOSIS — R06.02 SOB (SHORTNESS OF BREATH): ICD-10-CM

## 2023-07-06 DIAGNOSIS — I47.1 PAROXYSMAL SVT (SUPRAVENTRICULAR TACHYCARDIA) (HCC): ICD-10-CM

## 2023-07-06 DIAGNOSIS — I15.9 SECONDARY HYPERTENSION: ICD-10-CM

## 2023-07-06 DIAGNOSIS — I15.9 SECONDARY HYPERTENSION: Primary | ICD-10-CM

## 2023-07-06 PROCEDURE — 99214 OFFICE O/P EST MOD 30 MIN: CPT | Performed by: INTERNAL MEDICINE

## 2023-07-06 PROCEDURE — 3074F SYST BP LT 130 MM HG: CPT | Performed by: INTERNAL MEDICINE

## 2023-07-06 PROCEDURE — 1123F ACP DISCUSS/DSCN MKR DOCD: CPT | Performed by: INTERNAL MEDICINE

## 2023-07-06 PROCEDURE — 3078F DIAST BP <80 MM HG: CPT | Performed by: INTERNAL MEDICINE

## 2023-07-06 PROCEDURE — 93283 PRGRMG EVAL IMPLANTABLE DFB: CPT | Performed by: INTERNAL MEDICINE

## 2023-07-07 ENCOUNTER — TELEPHONE (OUTPATIENT)
Dept: FAMILY MEDICINE CLINIC | Age: 78
End: 2023-07-07

## 2023-07-07 NOTE — TELEPHONE ENCOUNTER
Darcy from [facility] called and needs orders from [facility] for patient to receive another modified barium swallow study. States they would like to upgrade her from her current status.   Please advise

## 2023-07-10 ENCOUNTER — OFFICE VISIT (OUTPATIENT)
Dept: FAMILY MEDICINE CLINIC | Age: 78
End: 2023-07-10

## 2023-07-10 VITALS — OXYGEN SATURATION: 93 % | HEART RATE: 90 BPM | TEMPERATURE: 97.4 F

## 2023-07-10 DIAGNOSIS — J44.1 ACUTE EXACERBATION OF CHRONIC OBSTRUCTIVE PULMONARY DISEASE (COPD) (HCC): Primary | ICD-10-CM

## 2023-07-10 RX ORDER — AMOXICILLIN AND CLAVULANATE POTASSIUM 875; 125 MG/1; MG/1
1 TABLET, FILM COATED ORAL 2 TIMES DAILY
Qty: 14 TABLET | Refills: 0 | Status: SHIPPED | OUTPATIENT
Start: 2023-07-10 | End: 2023-07-17

## 2023-07-10 RX ORDER — PREDNISONE 10 MG/1
10 TABLET ORAL 2 TIMES DAILY
Qty: 10 TABLET | Refills: 0 | Status: SHIPPED | OUTPATIENT
Start: 2023-07-10 | End: 2023-07-15

## 2023-07-11 ENCOUNTER — APPOINTMENT (OUTPATIENT)
Dept: CT IMAGING | Age: 78
End: 2023-07-11
Payer: MEDICARE

## 2023-07-11 ENCOUNTER — HOSPITAL ENCOUNTER (EMERGENCY)
Age: 78
Discharge: HOME | End: 2023-07-11
Payer: MEDICARE

## 2023-07-11 VITALS
OXYGEN SATURATION: 94 % | SYSTOLIC BLOOD PRESSURE: 127 MMHG | HEART RATE: 98 BPM | HEIGHT: 68 IN | WEIGHT: 181 LBS | BODY MASS INDEX: 27.43 KG/M2 | TEMPERATURE: 98.3 F | RESPIRATION RATE: 16 BRPM | DIASTOLIC BLOOD PRESSURE: 83 MMHG

## 2023-07-11 DIAGNOSIS — S09.90XA CLOSED HEAD INJURY WITHOUT LOSS OF CONSCIOUSNESS, INITIAL ENCOUNTER: Primary | ICD-10-CM

## 2023-07-11 DIAGNOSIS — S01.01XA LACERATION OF SCALP, INITIAL ENCOUNTER: ICD-10-CM

## 2023-07-11 DIAGNOSIS — Z79.01 CHRONIC ANTICOAGULATION: ICD-10-CM

## 2023-07-11 LAB
INR PPP: 2.19 (ref 0.84–1.16)
PROTHROMBIN TIME: 24.3 SEC (ref 11.5–14.8)

## 2023-07-11 PROCEDURE — 72125 CT NECK SPINE W/O DYE: CPT

## 2023-07-11 PROCEDURE — 70450 CT HEAD/BRAIN W/O DYE: CPT

## 2023-07-11 PROCEDURE — 12031 INTMD RPR S/A/T/EXT 2.5 CM/<: CPT

## 2023-07-11 PROCEDURE — 90714 TD VACC NO PRESV 7 YRS+ IM: CPT | Performed by: PHYSICIAN ASSISTANT

## 2023-07-11 PROCEDURE — 90471 IMMUNIZATION ADMIN: CPT | Performed by: PHYSICIAN ASSISTANT

## 2023-07-11 PROCEDURE — 99284 EMERGENCY DEPT VISIT MOD MDM: CPT

## 2023-07-11 PROCEDURE — 85610 PROTHROMBIN TIME: CPT

## 2023-07-11 PROCEDURE — 6360000002 HC RX W HCPCS: Performed by: PHYSICIAN ASSISTANT

## 2023-07-11 RX ADMIN — CLOSTRIDIUM TETANI TOXOID ANTIGEN (FORMALDEHYDE INACTIVATED) AND CORYNEBACTERIUM DIPHTHERIAE TOXOID ANTIGEN (FORMALDEHYDE INACTIVATED) 0.5 ML: 5; 2 INJECTION, SUSPENSION INTRAMUSCULAR at 11:57

## 2023-07-11 ASSESSMENT — ENCOUNTER SYMPTOMS
SHORTNESS OF BREATH: 0
COUGH: 0
ABDOMINAL PAIN: 0
NAUSEA: 0
VOMITING: 0
DIARRHEA: 0
CHEST TIGHTNESS: 0

## 2023-07-11 NOTE — ED PROVIDER NOTES
[facility]        Pt Name: Wilder Xie  MRN: [id number]  [address] [place] 1945  Date of evaluation: 7/11/2023  Provider: Farhad Cheng PA-C  PCP: Jordan Humphries MD  Note Started: 11:38 AM EDT 7/11/23      DELFIN. I have evaluated this patient. CHIEF COMPLAINT       Chief Complaint   Patient presents with    Fall     Pt. In per [facility] with report of her falling after getting on the scale this am. States she fell backwards and hit her head on a chair bleeding controlled at arrival, no LOC, pt. A&OX 4 states she is just starting with a HA, TAKES COUMADIN. HISTORY OF PRESENT ILLNESS: 1 or more Elements     History from : Patient    Limitations to history : None    Wilder Xie is a 69 y.o. female who presents to the emergency department today via ambulance from home stating she was standing on a scale to weigh herself when she was getting off and lost her balance and fell backwards. She did hit the back of her head on the ground. This was witnessed by her . She did not lose consciousness. She states she does have a very mild headache she rates a 2/10. She denies lightheadedness, dizziness, vision changes, numbness, tingling or weakness in her extremities. She denies having any chest pain or shortness of breath. She is anticoagulated on Coumadin. Nursing Notes were all reviewed and agreed with or any disagreements were addressed in the HPI. REVIEW OF SYSTEMS :      Review of Systems   Constitutional:  Negative for chills and fever. Respiratory:  Negative for cough, chest tightness and shortness of breath. Cardiovascular:  Negative for chest pain and palpitations. Gastrointestinal:  Negative for abdominal pain, diarrhea, nausea and vomiting. Genitourinary:  Negative for difficulty urinating, dysuria, flank pain, frequency, hematuria and urgency. Skin:  Positive for wound.    Neurological:

## 2023-07-12 ENCOUNTER — CARE COORDINATION (OUTPATIENT)
Dept: CARE COORDINATION | Age: 78
End: 2023-07-12

## 2023-07-12 NOTE — CARE COORDINATION
Ambulatory Care Coordination  ED Follow up Call    Reason for ED visit:  Fall, Laceration to back of head   Status:     significantly improved    Did you call your PCP prior to going to the ED? No      Did you receive a discharge instructions from the Emergency Room? Yes  Review of Instructions:     Understands what to report/when to return?:  Yes   Understands discharge instructions?:  Yes   Following discharge instructions?:  Yes   If not why? N/A    Are there any new complaints of pain? No  New Pain Meds? No    Constipation prophylaxis needed? N/A    If you have a wound is the dressing clean, dry, and intact? Yes  Understands wound care regimen? Yes    Are there any other complaints/concerns that you wish to tell your provider? Challenges to be reviewed by the provider   Additional needs identified to be addressed with provider Yes  Patient needs appointment for suture removal. Per patient she was given two conflicting times for removal. Patient advised ED provider advised her to schedule appointment for suture removal in 7 day but discharge instructions states 10 days. Patient is requesting call from PCP office to schedule appropriate follow up appointment for suture removal.                  FU appts/Provider:    Future Appointments   Date Time Provider [address] [place] [address]   7/24/2023  2:30 PM Rula Álavrez  [facility]   8/7/2023  8:00 AM [place] CHECK FF Cardio MMA   8/16/2023  8:30 AM SOLEDAD Montana - CNS FF Cardio MMA   9/18/2023  7:30 AM SCHEDULE, SHERYL OPTIVOL CHECK FF Cardio MMA   10/23/2023  7:30 AM SCHEDULE, SHERYL OPTIVOL CHECK FF Cardio MMA   11/27/2023  7:30 AM SCHEDULE, [place] OPTIVOL CHECK FF Cardio MMA           New Medications?:   No      Medication Reconciliation by phone - patient was seen and treated at [facility]. Med list is UTD. Understands Medications? Yes  Taking Medications? Yes  Can you swallow your pills?   Yes    Any further

## 2023-07-13 ENCOUNTER — TELEPHONE (OUTPATIENT)
Dept: CARDIOLOGY CLINIC | Age: 78
End: 2023-07-13

## 2023-07-13 NOTE — TELEPHONE ENCOUNTER
From: Nisa Wilhelm  To: Mahi Patel  Sent: 7/6/2023 2:47 PM EDT  Subject: [place]    Dad asked that I message you and let you know the home nurse that is seeing mom said Monday 7/3/23 was her last blood draw she would be doing at home.     Thanks   Cuauhtemoc Chowdary (daughter)

## 2023-07-13 NOTE — TELEPHONE ENCOUNTER
She can take an extra 20 mg of torsemide if weight is up tomorrow as the prednisone is most likely causing the weight gain and the HR elevation

## 2023-07-13 NOTE — TELEPHONE ENCOUNTER
Spoke to patient her BP has been normal and Hr rate has slowed down now 90's to low 100's while resting with activity goes up to 135,  current weight 184 she was 181 on Monday, swelling a bit in legs and feet not a whole lot she took the extra water pill today. Watching salt and fluids. Feeling feeling weak and tired today.

## 2023-07-13 NOTE — TELEPHONE ENCOUNTER
Has been on prednisone since last Monday she is taking 20 mg BID and a antibiotic Augmentin.  She is feeling better this afternoon

## 2023-07-13 NOTE — TELEPHONE ENCOUNTER
Pt's  states pt's hr is jumping around today low 90 to 118. She has low energy and slight swelling in feet, ankles, and legs. Please call to discuss.

## 2023-07-17 ENCOUNTER — ANTI-COAG VISIT (OUTPATIENT)
Dept: FAMILY MEDICINE CLINIC | Age: 78
End: 2023-07-17

## 2023-07-17 LAB — INR BLD: 3.3

## 2023-07-20 ENCOUNTER — CARE COORDINATION (OUTPATIENT)
Dept: CARE COORDINATION | Age: 78
End: 2023-07-20

## 2023-07-20 SDOH — ECONOMIC STABILITY: INCOME INSECURITY: IN THE LAST 12 MONTHS, WAS THERE A TIME WHEN YOU WERE NOT ABLE TO PAY THE MORTGAGE OR RENT ON TIME?: NO

## 2023-07-20 SDOH — ECONOMIC STABILITY: TRANSPORTATION INSECURITY
IN THE PAST 12 MONTHS, HAS LACK OF TRANSPORTATION KEPT YOU FROM MEETINGS, WORK, OR FROM GETTING THINGS NEEDED FOR DAILY LIVING?: NO

## 2023-07-20 SDOH — ECONOMIC STABILITY: HOUSING INSECURITY: IN THE LAST 12 MONTHS, HOW MANY PLACES HAVE YOU LIVED?: 1

## 2023-07-20 SDOH — HEALTH STABILITY: PHYSICAL HEALTH: ON AVERAGE, HOW MANY DAYS PER WEEK DO YOU ENGAGE IN MODERATE TO STRENUOUS EXERCISE (LIKE A BRISK WALK)?: 0 DAYS

## 2023-07-20 SDOH — HEALTH STABILITY: PHYSICAL HEALTH: ON AVERAGE, HOW MANY MINUTES DO YOU ENGAGE IN EXERCISE AT THIS LEVEL?: 0 MIN

## 2023-07-20 SDOH — ECONOMIC STABILITY: TRANSPORTATION INSECURITY
IN THE PAST 12 MONTHS, HAS THE LACK OF TRANSPORTATION KEPT YOU FROM MEDICAL APPOINTMENTS OR FROM GETTING MEDICATIONS?: NO

## 2023-07-20 ASSESSMENT — SOCIAL DETERMINANTS OF HEALTH (SDOH)
HOW OFTEN DO YOU ATTEND CHURCH OR RELIGIOUS SERVICES?: MORE THAN 4 TIMES PER YEAR
HOW OFTEN DO YOU GET TOGETHER WITH FRIENDS OR RELATIVES?: MORE THAN THREE TIMES A WEEK
IN A TYPICAL WEEK, HOW MANY TIMES DO YOU TALK ON THE PHONE WITH FAMILY, FRIENDS, OR NEIGHBORS?: MORE THAN THREE TIMES A WEEK
DO YOU BELONG TO ANY CLUBS OR ORGANIZATIONS SUCH AS CHURCH GROUPS UNIONS, FRATERNAL OR ATHLETIC GROUPS, OR SCHOOL GROUPS?: NO
HOW OFTEN DO YOU ATTENT MEETINGS OF THE CLUB OR ORGANIZATION YOU BELONG TO?: NEVER

## 2023-07-20 ASSESSMENT — LIFESTYLE VARIABLES
HOW OFTEN DO YOU HAVE A DRINK CONTAINING ALCOHOL: NEVER
HOW MANY STANDARD DRINKS CONTAINING ALCOHOL DO YOU HAVE ON A TYPICAL DAY: PATIENT DOES NOT DRINK

## 2023-07-20 NOTE — CARE COORDINATION
blood sugar?: Daily, Meals (Comment: Freestyle)   Do you have barriers with adherence to non-pharmacologic self-management interventions?  (Nutrition/Exercise/Self-Monitoring): No   Have you ever had to go to the ED for symptoms of low blood sugar?: No       Do you have hyperglycemia symptoms?: No   Do you have hypoglycemia symptoms?: No   Last Blood Sugar Value: 105   Blood Sugar Monitoring Regimen: Morning Fasting, At Bedtime, Before Meals   Blood Sugar Trends: Fluctuating        ,   Congestive Heart Failure Assessment    Are you currently restricting fluids?: No Restriction  Do you understand a low sodium diet?: Yes  Do you understand how to read food labels?: Yes  How many restaurant meals do you eat per week?: 0  Do you salt your food before tasting it?: No         Symptoms:  None: Yes      Symptom course: stable  Patient-reported weight (lb): 183  Weight trend: stable  Salt intake watch compared to last visit: stable     ,   General Assessment    Do you have any symptoms that are causing concern?: No     , and Care Coordination Episodes    Type: Amb Care Management  Episode: Complex Care  Noted: 7/12/2023

## 2023-07-21 ENCOUNTER — OFFICE VISIT (OUTPATIENT)
Dept: FAMILY MEDICINE CLINIC | Age: 78
End: 2023-07-21

## 2023-07-21 VITALS
OXYGEN SATURATION: 98 % | WEIGHT: 182.5 LBS | BODY MASS INDEX: 27.75 KG/M2 | HEART RATE: 88 BPM | DIASTOLIC BLOOD PRESSURE: 82 MMHG | TEMPERATURE: 97.3 F | SYSTOLIC BLOOD PRESSURE: 122 MMHG | RESPIRATION RATE: 16 BRPM

## 2023-07-21 DIAGNOSIS — N18.31 CHRONIC RENAL FAILURE, STAGE 3A (HCC): ICD-10-CM

## 2023-07-21 DIAGNOSIS — S01.01XA SCALP LACERATION, INITIAL ENCOUNTER: Primary | ICD-10-CM

## 2023-07-21 LAB
ANION GAP SERPL CALCULATED.3IONS-SCNC: 13 MMOL/L (ref 3–16)
BUN SERPL-MCNC: 43 MG/DL (ref 7–20)
CALCIUM SERPL-MCNC: 9.1 MG/DL (ref 8.3–10.6)
CHLORIDE SERPL-SCNC: 95 MMOL/L (ref 99–110)
CO2 SERPL-SCNC: 29 MMOL/L (ref 21–32)
CREAT SERPL-MCNC: 1.5 MG/DL (ref 0.6–1.2)
GFR SERPLBLD CREATININE-BSD FMLA CKD-EPI: 36 ML/MIN/{1.73_M2}
GLUCOSE SERPL-MCNC: 146 MG/DL (ref 70–99)
POTASSIUM SERPL-SCNC: 3.6 MMOL/L (ref 3.5–5.1)
SODIUM SERPL-SCNC: 137 MMOL/L (ref 136–145)

## 2023-07-21 NOTE — PROGRESS NOTES
Subjective:      Patient ID: Wilder Xie is a 69 y.o. female. CC: Patient presents for hospital follow-up.     HPI pt is here with her  for a hospital follow up and staples removal. Pt was seen at [facility] due to falling in her kitchen floor and hitting the back of her head    Review of Systems  Patient Active Problem List   Diagnosis    Anticoagulated on Coumadin    Hypercholesteremia    Generalized osteoarthrosis, involving multiple sites    Eosinophilic gastritis    Paroxysmal SVT (supraventricular tachycardia) ([facility])    B12 deficiency    History of pulmonary embolism    Multiple pulmonary nodules    Asthma    PAF (paroxysmal atrial fibrillation) ([facility])    Hypertension    Coronary artery disease    Mitral valve insufficiency    S/P MVR (mitral valve replacement)    S/P CABG x 3    Goiter    Primary osteoarthritis of both knees    Splenic infarct    Obesity, Class I, BMI 30-34.9    Chronic HFrEF (heart failure with reduced ejection fraction) ([facility])    Degenerative disc disease, thoracic    Persistent atrial fibrillation ([facility])    S/P MVR (mitral valve repair)    Ischemic cardiomyopathy    Occlusion and stenosis of bilateral carotid arteries    Pneumatosis intestinalis    Aortic valve stenosis, severe    Deep vein thrombosis (DVT) of non-extremity vein    Acute systolic congestive heart failure (HCC)    Stage 3 chronic kidney disease ([address])    Acute kidney injury superimposed on chronic kidney disease (HCC)    Chronic diastolic heart failure (HCC)    Hypokalemia    Atherosclerosis of superior mesenteric artery ([address])    ICD (implantable cardioverter-defibrillator) in place    Type 2 diabetes mellitus with stage 3b chronic kidney disease, with long-term current use of insulin ([address])    Acquired hypothyroidism    Hypercoagulable state, secondary ([address])    Chronic midline thoracic back pain    Chronic Compression fracture of thoracic vertebra (HCC)    Lumbar stenosis    Cardiomyopathy ([address])    Gastroparesis due to DM

## 2023-07-24 ENCOUNTER — OFFICE VISIT (OUTPATIENT)
Dept: FAMILY MEDICINE CLINIC | Age: 78
End: 2023-07-24

## 2023-07-24 ENCOUNTER — TELEPHONE (OUTPATIENT)
Dept: FAMILY MEDICINE CLINIC | Age: 78
End: 2023-07-24

## 2023-07-24 ENCOUNTER — CARE COORDINATION (OUTPATIENT)
Dept: CASE MANAGEMENT | Age: 78
End: 2023-07-24

## 2023-07-24 VITALS
HEART RATE: 70 BPM | SYSTOLIC BLOOD PRESSURE: 102 MMHG | OXYGEN SATURATION: 97 % | WEIGHT: 187 LBS | TEMPERATURE: 97.6 F | DIASTOLIC BLOOD PRESSURE: 62 MMHG | BODY MASS INDEX: 28.43 KG/M2

## 2023-07-24 DIAGNOSIS — Z79.4 TYPE 2 DIABETES MELLITUS WITH STAGE 3B CHRONIC KIDNEY DISEASE, WITH LONG-TERM CURRENT USE OF INSULIN (HCC): Primary | ICD-10-CM

## 2023-07-24 DIAGNOSIS — N18.9 ACUTE KIDNEY INJURY SUPERIMPOSED ON CHRONIC KIDNEY DISEASE (HCC): ICD-10-CM

## 2023-07-24 DIAGNOSIS — E11.22 TYPE 2 DIABETES MELLITUS WITH STAGE 3B CHRONIC KIDNEY DISEASE, WITH LONG-TERM CURRENT USE OF INSULIN (HCC): Primary | ICD-10-CM

## 2023-07-24 DIAGNOSIS — N17.9 ACUTE KIDNEY INJURY SUPERIMPOSED ON CHRONIC KIDNEY DISEASE (HCC): ICD-10-CM

## 2023-07-24 DIAGNOSIS — I50.32 CHRONIC DIASTOLIC HEART FAILURE (HCC): ICD-10-CM

## 2023-07-24 DIAGNOSIS — N18.32 TYPE 2 DIABETES MELLITUS WITH STAGE 3B CHRONIC KIDNEY DISEASE, WITH LONG-TERM CURRENT USE OF INSULIN (HCC): Primary | ICD-10-CM

## 2023-07-24 NOTE — PROGRESS NOTES
Subjective:      Patient ID: Wilder Xie is a 69 y.o. female. CC: Patient presents for re-evaluation of chronic health problems including diabetes mellitus with chronic kidney disease, chronic diastolic heart failure, and recent acute exacerbation of COPD. Leonard BROWN Patient presents for a one month follow-up on her labs and hospital visits in accompaniment of . She overall feels she is doing better at this point time. She feels her head laceration is healed and not causing any pain or discomfort. Patient feels her breathing is back to more normal range. Blood sugars have returned more to her baseline readings after the burst of prednisone medication. Patient does have an appoint with both cardiology and nephrology in the next several weeks.     Review of Systems        Patient Active Problem List   Diagnosis    Anticoagulated on Coumadin    Hypercholesteremia    Generalized osteoarthrosis, involving multiple sites    Eosinophilic gastritis    Paroxysmal SVT (supraventricular tachycardia) ([facility])    B12 deficiency    History of pulmonary embolism    Multiple pulmonary nodules    Asthma    PAF (paroxysmal atrial fibrillation) ([facility])    Hypertension    Coronary artery disease    Mitral valve insufficiency    S/P MVR (mitral valve replacement)    S/P CABG x 3    Goiter    Primary osteoarthritis of both knees    Splenic infarct    Obesity, Class I, BMI 30-34.9    Chronic HFrEF (heart failure with reduced ejection fraction) ([facility])    Degenerative disc disease, thoracic    Persistent atrial fibrillation ([facility])    S/P MVR (mitral valve repair)    Ischemic cardiomyopathy    Occlusion and stenosis of bilateral carotid arteries    Pneumatosis intestinalis    Aortic valve stenosis, severe    Deep vein thrombosis (DVT) of non-extremity vein    Acute systolic congestive heart failure (HCC)    Stage 3 chronic kidney disease (HCC)    Acute kidney injury superimposed on chronic kidney disease (HCC)    Chronic diastolic heart

## 2023-07-24 NOTE — TELEPHONE ENCOUNTER
Pinky Albright from [facility] called and stated that the patient has met all goals on her watch. She stated that she is being discharged today. Call back number: [phone]  For any questions.

## 2023-07-25 ENCOUNTER — CARE COORDINATION (OUTPATIENT)
Dept: CASE MANAGEMENT | Age: 78
End: 2023-07-25

## 2023-07-25 NOTE — CARE COORDINATION
LPN CC attempted to reach patient for care coordination outreach. Unable to reach patient. Left detailed VM with reason for call & contact information.    Serafin Mei LPN [address] [facility]  [phone]

## 2023-07-26 ENCOUNTER — CARE COORDINATION (OUTPATIENT)
Dept: CASE MANAGEMENT | Age: 78
End: 2023-07-26

## 2023-07-26 NOTE — CARE COORDINATION
your blood sugar?: Daily, Meals (Comment: Freestyle)   Do you have barriers with adherence to non-pharmacologic self-management interventions?  (Nutrition/Exercise/Self-Monitoring): No   Have you ever had to go to the ED for symptoms of low blood sugar?: No       Do you have hyperglycemia symptoms?: No   Do you have hypoglycemia symptoms?: No   Last Blood Sugar Value: 102   Blood Sugar Monitoring Regimen: Before Meals, At Bedtime   Blood Sugar Trends: Fluctuating (Comment: was on predisone)        ,   Congestive Heart Failure Assessment    Are you currently restricting fluids?: No Restriction  Do you understand a low sodium diet?: Yes  Do you understand how to read food labels?: Yes  How many restaurant meals do you eat per week?: 0  Do you salt your food before tasting it?: No         Symptoms:  CHF associated leg swelling: Pos      Patient-reported weight (lb): 190  Salt intake watch compared to last visit: stable     , and   [address] Coordinator  [phone]

## 2023-07-28 ENCOUNTER — ANTI-COAG VISIT (OUTPATIENT)
Dept: FAMILY MEDICINE CLINIC | Age: 78
End: 2023-07-28

## 2023-07-28 LAB — INR BLD: 4

## 2023-07-31 LAB — INR BLD: 1.9

## 2023-08-03 ENCOUNTER — CARE COORDINATION (OUTPATIENT)
Dept: CARE COORDINATION | Age: 78
End: 2023-08-03

## 2023-08-03 NOTE — CARE COORDINATION
ACM attempted to reach patient by phone. No answer. Left brief message with name, contact number and asked for return call back. Waiting for patient response at this time. Will update notes when callback is received. Will follow up at another date and time.

## 2023-08-07 ENCOUNTER — TELEPHONE (OUTPATIENT)
Dept: CARDIOLOGY CLINIC | Age: 78
End: 2023-08-07

## 2023-08-08 NOTE — TELEPHONE ENCOUNTER
Spoke to patient's spouse he stated her feet and legs are swollen, she has gained weight current weight 191.8, has sob all the time, taking torsemide and spironolactone as prescribed.  Watching fluids and salt

## 2023-08-11 ENCOUNTER — TELEPHONE (OUTPATIENT)
Dept: CARDIOLOGY CLINIC | Age: 78
End: 2023-08-11

## 2023-08-11 DIAGNOSIS — I50.22 CHRONIC SYSTOLIC CHF (CONGESTIVE HEART FAILURE), NYHA CLASS 3 (HCC): ICD-10-CM

## 2023-08-12 LAB — NT-PROBNP SERPL-MCNC: ABNORMAL PG/ML (ref 0–449)

## 2023-08-14 DIAGNOSIS — D64.9 ANEMIA, UNSPECIFIED TYPE: Primary | ICD-10-CM

## 2023-08-14 DIAGNOSIS — D64.9 ANEMIA, UNSPECIFIED TYPE: ICD-10-CM

## 2023-08-14 LAB
FERRITIN SERPL IA-MCNC: 207.1 NG/ML (ref 15–150)
IRON SATN MFR SERPL: 19 % (ref 15–50)
IRON SERPL-MCNC: 47 UG/DL (ref 37–145)
TIBC SERPL-MCNC: 254 UG/DL (ref 260–445)

## 2023-08-15 LAB — INR BLD: 3.3

## 2023-08-16 ENCOUNTER — ANTI-COAG VISIT (OUTPATIENT)
Dept: FAMILY MEDICINE CLINIC | Age: 78
End: 2023-08-16

## 2023-08-16 ENCOUNTER — OFFICE VISIT (OUTPATIENT)
Dept: CARDIOLOGY CLINIC | Age: 78
End: 2023-08-16
Payer: MEDICARE

## 2023-08-16 VITALS
WEIGHT: 200 LBS | SYSTOLIC BLOOD PRESSURE: 112 MMHG | HEIGHT: 66 IN | HEART RATE: 66 BPM | DIASTOLIC BLOOD PRESSURE: 70 MMHG | BODY MASS INDEX: 32.14 KG/M2 | OXYGEN SATURATION: 90 %

## 2023-08-16 DIAGNOSIS — Z95.810 ICD (IMPLANTABLE CARDIOVERTER-DEFIBRILLATOR), DUAL, IN SITU: ICD-10-CM

## 2023-08-16 DIAGNOSIS — I50.22 CHRONIC SYSTOLIC CHF (CONGESTIVE HEART FAILURE), NYHA CLASS 3 (HCC): Primary | ICD-10-CM

## 2023-08-16 DIAGNOSIS — I95.9 HYPOTENSION, UNSPECIFIED HYPOTENSION TYPE: ICD-10-CM

## 2023-08-16 DIAGNOSIS — N28.9 RENAL INSUFFICIENCY: ICD-10-CM

## 2023-08-16 DIAGNOSIS — I35.0 NONRHEUMATIC AORTIC VALVE STENOSIS: ICD-10-CM

## 2023-08-16 DIAGNOSIS — I48.0 PAF (PAROXYSMAL ATRIAL FIBRILLATION) (HCC): ICD-10-CM

## 2023-08-16 PROCEDURE — 1123F ACP DISCUSS/DSCN MKR DOCD: CPT | Performed by: CLINICAL NURSE SPECIALIST

## 2023-08-16 PROCEDURE — 3078F DIAST BP <80 MM HG: CPT | Performed by: CLINICAL NURSE SPECIALIST

## 2023-08-16 PROCEDURE — 3074F SYST BP LT 130 MM HG: CPT | Performed by: CLINICAL NURSE SPECIALIST

## 2023-08-16 PROCEDURE — 99214 OFFICE O/P EST MOD 30 MIN: CPT | Performed by: CLINICAL NURSE SPECIALIST

## 2023-08-16 RX ORDER — TORSEMIDE 20 MG/1
40 TABLET ORAL 2 TIMES DAILY
Qty: 180 TABLET | Refills: 1 | Status: SHIPPED
Start: 2023-08-16

## 2023-08-16 NOTE — PROGRESS NOTES
on midodrine   5. Renal insufficiency Dr Zenon Lewis   6.  ICD (implantable cardioverter-defibrillator), dual, in situ        Plan:   Patient Instructions   Increase torsemide 40 mg twice a day along with an extra potassium   Call me or email me on Tuesday if no improvement in weight  Will plan for IV lasix next week if no improvement in swelling or weight along with blood wor  RTO in 2 weeks    She declines any further workup for her aortic valve  Pulmonary declines bronchoscopy due to her heart  Seen by palliative and patient wants treatment with medications      I appreciate the opportunity of cooperating in the care of this individual.    Ramu Guadarrama, APRN - CNS, CNS, 8/16/2023, 9:29 AM

## 2023-08-17 ENCOUNTER — CARE COORDINATION (OUTPATIENT)
Dept: CARE COORDINATION | Age: 78
End: 2023-08-17

## 2023-08-19 PROCEDURE — G2066 INTER DEVC REMOTE 30D: HCPCS | Performed by: CLINICAL NURSE SPECIALIST

## 2023-08-19 PROCEDURE — 93297 REM INTERROG DEV EVAL ICPMS: CPT | Performed by: CLINICAL NURSE SPECIALIST

## 2023-08-21 ENCOUNTER — PATIENT MESSAGE (OUTPATIENT)
Dept: CARDIOLOGY CLINIC | Age: 78
End: 2023-08-21

## 2023-08-21 NOTE — TELEPHONE ENCOUNTER
Please call and let them know that she is scheduled this wed at 8 am for IV lasix and infusion in the infusion center

## 2023-08-23 ENCOUNTER — HOSPITAL ENCOUNTER (OUTPATIENT)
Dept: ONCOLOGY | Age: 78
Setting detail: INFUSION SERIES
Discharge: HOME | End: 2023-08-23
Payer: MEDICARE

## 2023-08-23 VITALS
RESPIRATION RATE: 16 BRPM | TEMPERATURE: 96.2 F | OXYGEN SATURATION: 100 % | DIASTOLIC BLOOD PRESSURE: 58 MMHG | HEART RATE: 63 BPM | SYSTOLIC BLOOD PRESSURE: 98 MMHG

## 2023-08-23 LAB
ANION GAP SERPL CALCULATED.3IONS-SCNC: 13 MMOL/L (ref 3–16)
BUN SERPL-MCNC: 48 MG/DL (ref 7–20)
CALCIUM SERPL-MCNC: 8.4 MG/DL (ref 8.3–10.6)
CHLORIDE SERPL-SCNC: 99 MMOL/L (ref 99–110)
CO2 SERPL-SCNC: 29 MMOL/L (ref 21–32)
CREAT SERPL-MCNC: 1.9 MG/DL (ref 0.6–1.2)
GFR SERPLBLD CREATININE-BSD FMLA CKD-EPI: 27 ML/MIN/{1.73_M2}
GLUCOSE SERPL-MCNC: 88 MG/DL (ref 70–99)
NT-PROBNP SERPL-MCNC: ABNORMAL PG/ML (ref 0–449)
POTASSIUM SERPL-SCNC: 3.8 MMOL/L (ref 3.5–5.1)
SODIUM SERPL-SCNC: 141 MMOL/L (ref 136–145)

## 2023-08-23 PROCEDURE — 6360000002 HC RX W HCPCS: Performed by: CLINICAL NURSE SPECIALIST

## 2023-08-23 PROCEDURE — 6360000002 HC RX W HCPCS

## 2023-08-23 PROCEDURE — 96365 THER/PROPH/DIAG IV INF INIT: CPT

## 2023-08-23 PROCEDURE — 96374 THER/PROPH/DIAG INJ IV PUSH: CPT

## 2023-08-23 PROCEDURE — 83880 ASSAY OF NATRIURETIC PEPTIDE: CPT

## 2023-08-23 PROCEDURE — 80048 BASIC METABOLIC PNL TOTAL CA: CPT

## 2023-08-23 PROCEDURE — 99211 OFF/OP EST MAY X REQ PHY/QHP: CPT

## 2023-08-23 PROCEDURE — 96366 THER/PROPH/DIAG IV INF ADDON: CPT

## 2023-08-23 PROCEDURE — 2580000003 HC RX 258: Performed by: CLINICAL NURSE SPECIALIST

## 2023-08-23 RX ORDER — SODIUM CHLORIDE 0.9 % (FLUSH) 0.9 %
5-40 SYRINGE (ML) INJECTION 2 TIMES DAILY
Status: DISCONTINUED | OUTPATIENT
Start: 2023-08-23 | End: 2023-08-24 | Stop reason: HOSPADM

## 2023-08-23 RX ORDER — FUROSEMIDE 10 MG/ML
120 INJECTION INTRAMUSCULAR; INTRAVENOUS ONCE
Status: COMPLETED | OUTPATIENT
Start: 2023-08-23 | End: 2023-08-23

## 2023-08-23 RX ADMIN — FUROSEMIDE 120 MG: 10 INJECTION, SOLUTION INTRAMUSCULAR; INTRAVENOUS at 08:30

## 2023-08-23 RX ADMIN — Medication 10 ML: at 08:30

## 2023-08-23 RX ADMIN — FUROSEMIDE 10 MG/HR: 10 INJECTION, SOLUTION INTRAMUSCULAR; INTRAVENOUS at 08:36

## 2023-08-23 NOTE — DISCHARGE INSTR - COC
Continuity of Care Form    Patient Name: Litzy Marquez   :  1945  MRN:  [id number]    Admit date:  2023  Discharge date:  ***    Code Status Order: Prior   Advance Directives:     Admitting Physician:  No admitting provider for patient encounter. PCP: Win Buenrostro MD    Discharging Nurse: [facility] Unit/Room#: No information available for this encounter.   Discharging Unit Phone Number: ***    Emergency Contact:   Extended Emergency Contact Information  Primary Emergency Contact: Krish Langston  Address: [address] [place] [address]  Home Phone: [phone]  Mobile Phone: [phone]  Relation: Spouse  Secondary Emergency Contact: [place] [address] [address] Phone: [phone]  Mobile Phone: [phone]  Relation: Child    Past Surgical History:  Past Surgical History:   Procedure Laterality Date    BRONCHOSCOPY  2016    Dr. Cate Kearns - brushings from [address] [place] [address]  2018    Dr. Shea Cruz and 5/3 2021    Cardiac cath, cardioversion and rafat     SECTION      X 2    CHOLECYSTECTOMY      COLONOSCOPY  2017    Dr. Gregorio Dela Cruz - sigmoid diverticulosis, polypectomies x3    COLONOSCOPY  2014    Dr. Gregorio Dela Cruz - sigmoid diverticulosis, polypectomies x3, internal hemorrhoids    COLONOSCOPY  10/10/2018    w/biopsy performed by Kyara Strickland MD at [facility] N/A 2020    COLONOSCOPY DIAGNOSTIC performed by Rui Pemberton MD at [address] [place] [address]  2018    Dr. Ida Barrett - x3 (LIMA-LAD, L SV-D1-PLV) modified BL MAZE procedure w/obliteration of WAYNE using 45mm AtriClip    INSERTABLE CARDIAC MONITOR Left 2018    Dr. Radha Chaudhry [facility] SN# [id number] [employer]    IR KYPHOPLASTY THORACIC FIRST LEVEL  2022    IR KYPHOPLASTY THORACIC FIRST LEVEL 2022 [facility] SPECIAL PROCEDURES    MITRAL VALVE REPLACEMENT

## 2023-08-23 NOTE — PROGRESS NOTES
Patient to department for outpatient labs and lasix. Here from cardiology office. Labs drawn followed by lasix 120 mg at 20 mg per minute, then started at 10 ml an hour. To receive lasix at 10cc/hr for a total of 4 hours. Tolerated well. Patient aware that he will have urinary urgency and frequency. Also aware that this tx may lower his b/p and he may feel dizzy upon standing. All questions answered. Call light in reach. Plan of care reviewed with patient and . No needs stated at this time.

## 2023-08-23 NOTE — TELEPHONE ENCOUNTER
Heena Rosales,  called to get clarification on the med  torsemide how should the Pt be taking this med. Please call to clarify.   Thank you

## 2023-08-25 ENCOUNTER — ANTI-COAG VISIT (OUTPATIENT)
Dept: FAMILY MEDICINE CLINIC | Age: 78
End: 2023-08-25

## 2023-08-25 ENCOUNTER — TELEPHONE (OUTPATIENT)
Dept: FAMILY MEDICINE CLINIC | Age: 78
End: 2023-08-25

## 2023-08-25 DIAGNOSIS — E87.70 HYPERVOLEMIA, UNSPECIFIED HYPERVOLEMIA TYPE: ICD-10-CM

## 2023-08-25 LAB — INR BLD: 3.3

## 2023-08-25 RX ORDER — SODIUM CHLORIDE 0.9 % (FLUSH) 0.9 %
5-40 SYRINGE (ML) INJECTION ONCE
Start: 2023-08-25 | End: 2023-08-25

## 2023-08-25 RX ORDER — FUROSEMIDE 10 MG/ML
120 INJECTION INTRAMUSCULAR; INTRAVENOUS ONCE
Start: 2023-08-25 | End: 2023-08-25

## 2023-08-25 NOTE — TELEPHONE ENCOUNTER
PT/INR results, per Dr. Uvaldo Orellana, hold for 2 days, resume dose after the 2 days, and recheck in a week. Left message to call.

## 2023-08-26 ENCOUNTER — ENROLLMENT (OUTPATIENT)
Dept: CARE COORDINATION | Age: 78
End: 2023-08-26

## 2023-08-30 ENCOUNTER — HOSPITAL ENCOUNTER (OUTPATIENT)
Dept: ONCOLOGY | Age: 78
Setting detail: INFUSION SERIES
Discharge: HOME | End: 2023-08-30
Payer: MEDICARE

## 2023-08-30 VITALS
HEART RATE: 83 BPM | SYSTOLIC BLOOD PRESSURE: 112 MMHG | RESPIRATION RATE: 16 BRPM | TEMPERATURE: 97.6 F | DIASTOLIC BLOOD PRESSURE: 82 MMHG

## 2023-08-30 DIAGNOSIS — I50.22 CHRONIC HFREF (HEART FAILURE WITH REDUCED EJECTION FRACTION) (HCC): ICD-10-CM

## 2023-08-30 DIAGNOSIS — E87.70 HYPERVOLEMIA, UNSPECIFIED HYPERVOLEMIA TYPE: Primary | ICD-10-CM

## 2023-08-30 LAB
ANION GAP SERPL CALCULATED.3IONS-SCNC: 11 MMOL/L (ref 3–16)
BUN SERPL-MCNC: 63 MG/DL (ref 7–20)
CALCIUM SERPL-MCNC: 8.6 MG/DL (ref 8.3–10.6)
CHLORIDE SERPL-SCNC: 98 MMOL/L (ref 99–110)
CO2 SERPL-SCNC: 28 MMOL/L (ref 21–32)
CREAT SERPL-MCNC: 2.2 MG/DL (ref 0.6–1.2)
GFR SERPLBLD CREATININE-BSD FMLA CKD-EPI: 23 ML/MIN/{1.73_M2}
GLUCOSE SERPL-MCNC: 146 MG/DL (ref 70–99)
NT-PROBNP SERPL-MCNC: ABNORMAL PG/ML (ref 0–449)
POTASSIUM SERPL-SCNC: 3.8 MMOL/L (ref 3.5–5.1)
SODIUM SERPL-SCNC: 137 MMOL/L (ref 136–145)

## 2023-08-30 PROCEDURE — 99211 OFF/OP EST MAY X REQ PHY/QHP: CPT

## 2023-08-30 PROCEDURE — 6360000002 HC RX W HCPCS: Performed by: CLINICAL NURSE SPECIALIST

## 2023-08-30 PROCEDURE — 96365 THER/PROPH/DIAG IV INF INIT: CPT

## 2023-08-30 PROCEDURE — 96375 TX/PRO/DX INJ NEW DRUG ADDON: CPT

## 2023-08-30 PROCEDURE — 83880 ASSAY OF NATRIURETIC PEPTIDE: CPT

## 2023-08-30 PROCEDURE — 80048 BASIC METABOLIC PNL TOTAL CA: CPT

## 2023-08-30 PROCEDURE — 2580000003 HC RX 258: Performed by: CLINICAL NURSE SPECIALIST

## 2023-08-30 PROCEDURE — 96366 THER/PROPH/DIAG IV INF ADDON: CPT

## 2023-08-30 RX ORDER — FUROSEMIDE 10 MG/ML
120 INJECTION INTRAMUSCULAR; INTRAVENOUS ONCE
Status: COMPLETED | OUTPATIENT
Start: 2023-08-30 | End: 2023-08-30

## 2023-08-30 RX ORDER — SODIUM CHLORIDE 0.9 % (FLUSH) 0.9 %
5-40 SYRINGE (ML) INJECTION ONCE
Status: CANCELLED
Start: 2023-09-06 | End: 2023-09-06

## 2023-08-30 RX ORDER — SODIUM CHLORIDE 0.9 % (FLUSH) 0.9 %
5-40 SYRINGE (ML) INJECTION ONCE
Status: COMPLETED | OUTPATIENT
Start: 2023-08-30 | End: 2023-08-30

## 2023-08-30 RX ORDER — FUROSEMIDE 10 MG/ML
120 INJECTION INTRAMUSCULAR; INTRAVENOUS ONCE
Status: CANCELLED
Start: 2023-09-06 | End: 2023-09-06

## 2023-08-30 RX ADMIN — FUROSEMIDE 10 MG/HR: 10 INJECTION, SOLUTION INTRAMUSCULAR; INTRAVENOUS at 08:38

## 2023-08-30 RX ADMIN — SODIUM CHLORIDE, PRESERVATIVE FREE 10 ML: 5 INJECTION INTRAVENOUS at 08:35

## 2023-08-30 RX ADMIN — FUROSEMIDE 120 MG: 10 INJECTION, SOLUTION INTRAMUSCULAR; INTRAVENOUS at 08:31

## 2023-08-30 NOTE — PROGRESS NOTES
Patient to Department for labs and lasix. IV started with labs drawn followed by Lasix 120 mg at 20 mg per minute, then started at 10 ml an hour for a total of 4 hours. Pt denied need for printed AVS .Pt yvette IVP/Infusion with no adverse reaction noted. Pt to return next week for same treatment. Pt discharged home with  per W/C.

## 2023-08-30 NOTE — RESULT ENCOUNTER NOTE
I saw patient and  in the infusion center   She continues to have edema to her knees and weight is increasing  She is shortness of breath  She wants to continue IV lasix + infusion for 4 hours  I recommend that if she is not better soon she needs to be admitted or consider palliative  They both understand

## 2023-09-01 ENCOUNTER — CARE COORDINATION (OUTPATIENT)
Dept: CARE COORDINATION | Age: 78
End: 2023-09-01

## 2023-09-01 ENCOUNTER — ANTI-COAG VISIT (OUTPATIENT)
Dept: FAMILY MEDICINE CLINIC | Age: 78
End: 2023-09-01

## 2023-09-01 LAB — INR BLD: 4

## 2023-09-05 RX ORDER — METOCLOPRAMIDE 5 MG/1
TABLET ORAL
Qty: 60 TABLET | Refills: 3 | OUTPATIENT
Start: 2023-09-05

## 2023-09-06 ENCOUNTER — HOSPITAL ENCOUNTER (OUTPATIENT)
Dept: ONCOLOGY | Age: 78
Setting detail: INFUSION SERIES
Discharge: HOME | End: 2023-09-06
Payer: MEDICARE

## 2023-09-06 ENCOUNTER — TELEPHONE (OUTPATIENT)
Dept: FAMILY MEDICINE CLINIC | Age: 78
End: 2023-09-06

## 2023-09-06 VITALS
RESPIRATION RATE: 16 BRPM | SYSTOLIC BLOOD PRESSURE: 111 MMHG | TEMPERATURE: 97 F | HEART RATE: 81 BPM | BODY MASS INDEX: 33.25 KG/M2 | DIASTOLIC BLOOD PRESSURE: 72 MMHG | WEIGHT: 206 LBS | OXYGEN SATURATION: 94 %

## 2023-09-06 DIAGNOSIS — E87.70 HYPERVOLEMIA, UNSPECIFIED HYPERVOLEMIA TYPE: Primary | ICD-10-CM

## 2023-09-06 DIAGNOSIS — I50.22 CHRONIC HFREF (HEART FAILURE WITH REDUCED EJECTION FRACTION) (HCC): ICD-10-CM

## 2023-09-06 LAB
ANION GAP SERPL CALCULATED.3IONS-SCNC: 11 MMOL/L (ref 3–16)
BUN SERPL-MCNC: 54 MG/DL (ref 7–20)
CALCIUM SERPL-MCNC: 8.7 MG/DL (ref 8.3–10.6)
CHLORIDE SERPL-SCNC: 94 MMOL/L (ref 99–110)
CO2 SERPL-SCNC: 32 MMOL/L (ref 21–32)
CREAT SERPL-MCNC: 1.9 MG/DL (ref 0.6–1.2)
GFR SERPLBLD CREATININE-BSD FMLA CKD-EPI: 27 ML/MIN/{1.73_M2}
GLUCOSE SERPL-MCNC: 161 MG/DL (ref 70–99)
NT-PROBNP SERPL-MCNC: ABNORMAL PG/ML (ref 0–449)
POTASSIUM SERPL-SCNC: 3.8 MMOL/L (ref 3.5–5.1)
SODIUM SERPL-SCNC: 137 MMOL/L (ref 136–145)

## 2023-09-06 PROCEDURE — 83880 ASSAY OF NATRIURETIC PEPTIDE: CPT

## 2023-09-06 PROCEDURE — 2580000003 HC RX 258: Performed by: CLINICAL NURSE SPECIALIST

## 2023-09-06 PROCEDURE — 96375 TX/PRO/DX INJ NEW DRUG ADDON: CPT

## 2023-09-06 PROCEDURE — 96366 THER/PROPH/DIAG IV INF ADDON: CPT

## 2023-09-06 PROCEDURE — 6360000002 HC RX W HCPCS: Performed by: CLINICAL NURSE SPECIALIST

## 2023-09-06 PROCEDURE — 80048 BASIC METABOLIC PNL TOTAL CA: CPT

## 2023-09-06 PROCEDURE — 96365 THER/PROPH/DIAG IV INF INIT: CPT

## 2023-09-06 PROCEDURE — 99211 OFF/OP EST MAY X REQ PHY/QHP: CPT

## 2023-09-06 RX ORDER — SODIUM CHLORIDE 0.9 % (FLUSH) 0.9 %
5-40 SYRINGE (ML) INJECTION ONCE
Status: CANCELLED
Start: 2023-09-06 | End: 2023-09-06

## 2023-09-06 RX ORDER — FUROSEMIDE 10 MG/ML
120 INJECTION INTRAMUSCULAR; INTRAVENOUS ONCE
Status: COMPLETED | OUTPATIENT
Start: 2023-09-06 | End: 2023-09-06

## 2023-09-06 RX ORDER — FUROSEMIDE 10 MG/ML
120 INJECTION INTRAMUSCULAR; INTRAVENOUS ONCE
Status: CANCELLED
Start: 2023-09-06 | End: 2023-09-06

## 2023-09-06 RX ORDER — SODIUM CHLORIDE 0.9 % (FLUSH) 0.9 %
5-40 SYRINGE (ML) INJECTION ONCE
Status: COMPLETED | OUTPATIENT
Start: 2023-09-06 | End: 2023-09-06

## 2023-09-06 RX ADMIN — Medication 10 ML: at 08:38

## 2023-09-06 RX ADMIN — FUROSEMIDE 120 MG: 10 INJECTION, SOLUTION INTRAMUSCULAR; INTRAVENOUS at 08:37

## 2023-09-06 RX ADMIN — FUROSEMIDE 10 MG/HR: 10 INJECTION, SOLUTION INTRAMUSCULAR; INTRAVENOUS at 08:42

## 2023-09-06 NOTE — PROGRESS NOTES
Patient to department for outpatient labs and lasix. Here from cardiology office. Labs drawn followed by lasix 120 mg at 20 mg per minute, then started at 10 ml an hour. To receive lasix at 10cc/hr for a total of 4 hours. Tolerated well. Patient aware that he will have urinary urgency and frequency. Also aware that this tx may lower his b/p and he may feel dizzy upon standing. All questions answered. Call light in reach. Plan of care reviewed with patient and . No needs stated at this time. No questions or concerns stated. Patient states she has been feeling a little better, able to ambulate better this past weekend.

## 2023-09-06 NOTE — TELEPHONE ENCOUNTER
Per Cardio ( Violeta -Cardio ) Patient Legs Red Swollen hot to touch /      Annia Mcknight states that patient would like to know if we can call something in for her to help with this . She is reviewing Palliative care and doesn't have a decision made , but would prefer to not be admitted if possible. I advised Violeta that Dr Damian Carbajal was out today and she requested an appt with him tomorrow. Due to his limited availability I offered to send a message back to the on call provider.      [phone] Cell Number for Jennifer [address] [place] [address], [address] [facility] [address] [facility] [address]   Phone:  [phone]  Fax:  [phone]

## 2023-09-06 NOTE — RESULT ENCOUNTER NOTE
Discussed with patient and  in the infusion center  She is better and will continue weekly IV lasix 120 mg with 4 hour infusion at 10 mg/hr  Her lower legs are very red and warm  I will call Dr Osvaldo Cogan and ask him to treat

## 2023-09-07 ENCOUNTER — TELEPHONE (OUTPATIENT)
Dept: FAMILY MEDICINE CLINIC | Age: 78
End: 2023-09-07

## 2023-09-07 RX ORDER — CEPHALEXIN 500 MG/1
500 CAPSULE ORAL 3 TIMES DAILY
Qty: 21 CAPSULE | Refills: 0 | Status: SHIPPED | OUTPATIENT
Start: 2023-09-07 | End: 2023-09-14

## 2023-09-07 RX ORDER — CEPHALEXIN 500 MG/1
500 CAPSULE ORAL 3 TIMES DAILY
Qty: 21 CAPSULE | Refills: 0 | Status: SHIPPED | OUTPATIENT
Start: 2023-09-07 | End: 2023-09-07

## 2023-09-07 NOTE — TELEPHONE ENCOUNTER
Per the daughter Sindhu Choudhury the patient was to have a prescription for an Antibiotic called into the pharmacy. She is requesting a call in regards to the antibiotic. She has some concerns due to the patients Kidney function. She can be reached at [phone]. This is the note per Dr. Michelle Rice yesterday.      Cephalexin 500mg TID x 10 days  If not improving needs to be seen        [facility]/pharmacy #[id number], [place] - [address] [place] [address]   Phone:  [phone]  Fax:  [phone]

## 2023-09-12 ENCOUNTER — TELEPHONE (OUTPATIENT)
Dept: CARDIOLOGY CLINIC | Age: 78
End: 2023-09-12

## 2023-09-12 NOTE — TELEPHONE ENCOUNTER
Her optival continues to be elevated and receiving IV lasix 120 mg with 10 mg/hr infusion for 4 hours  for several weeks without any improvement in optival  Continues in AF

## 2023-09-13 ENCOUNTER — HOSPITAL ENCOUNTER (INPATIENT)
Age: 78
LOS: 6 days | Discharge: HOSPICE | DRG: 292 | End: 2023-09-19
Attending: INTERNAL MEDICINE | Admitting: INTERNAL MEDICINE
Payer: MEDICARE

## 2023-09-13 ENCOUNTER — HOSPITAL ENCOUNTER (OUTPATIENT)
Dept: ONCOLOGY | Age: 78
Setting detail: INFUSION SERIES
Discharge: HOME | DRG: 292 | End: 2023-09-13
Payer: MEDICARE

## 2023-09-13 VITALS
RESPIRATION RATE: 16 BRPM | DIASTOLIC BLOOD PRESSURE: 75 MMHG | TEMPERATURE: 97.3 F | SYSTOLIC BLOOD PRESSURE: 108 MMHG | HEART RATE: 97 BPM

## 2023-09-13 DIAGNOSIS — E87.70 HYPERVOLEMIA, UNSPECIFIED HYPERVOLEMIA TYPE: Primary | ICD-10-CM

## 2023-09-13 DIAGNOSIS — Z51.5 HOSPICE CARE PATIENT: Primary | ICD-10-CM

## 2023-09-13 DIAGNOSIS — I50.22 CHRONIC HFREF (HEART FAILURE WITH REDUCED EJECTION FRACTION) (HCC): ICD-10-CM

## 2023-09-13 PROBLEM — I50.23 ACUTE ON CHRONIC SYSTOLIC HEART FAILURE, NYHA CLASS 4 (HCC): Status: ACTIVE | Noted: 2019-01-15

## 2023-09-13 PROBLEM — I50.9 HEART FAILURE (HCC): Status: ACTIVE | Noted: 2023-09-13

## 2023-09-13 PROBLEM — I50.21 ACUTE SYSTOLIC HEART FAILURE (HCC): Status: ACTIVE | Noted: 2023-09-13

## 2023-09-13 PROBLEM — N28.9 RENAL INSUFFICIENCY: Status: ACTIVE | Noted: 2023-09-13

## 2023-09-13 LAB
ANION GAP SERPL CALCULATED.3IONS-SCNC: 10 MMOL/L (ref 3–16)
BUN SERPL-MCNC: 49 MG/DL (ref 7–20)
CALCIUM SERPL-MCNC: 8.7 MG/DL (ref 8.3–10.6)
CHLORIDE SERPL-SCNC: 99 MMOL/L (ref 99–110)
CO2 SERPL-SCNC: 27 MMOL/L (ref 21–32)
CREAT SERPL-MCNC: 2.3 MG/DL (ref 0.6–1.2)
GFR SERPLBLD CREATININE-BSD FMLA CKD-EPI: 21 ML/MIN/{1.73_M2}
GLUCOSE BLD-MCNC: 126 MG/DL (ref 70–99)
GLUCOSE SERPL-MCNC: 127 MG/DL (ref 70–99)
INR PPP: 1.59 (ref 0.84–1.16)
NT-PROBNP SERPL-MCNC: ABNORMAL PG/ML (ref 0–449)
PERFORMED ON: ABNORMAL
POTASSIUM SERPL-SCNC: 4.7 MMOL/L (ref 3.5–5.1)
PROTHROMBIN TIME: 18.9 SEC (ref 11.5–14.8)
SODIUM SERPL-SCNC: 136 MMOL/L (ref 136–145)

## 2023-09-13 PROCEDURE — 6370000000 HC RX 637 (ALT 250 FOR IP): Performed by: INTERNAL MEDICINE

## 2023-09-13 PROCEDURE — 6360000002 HC RX W HCPCS: Performed by: CLINICAL NURSE SPECIALIST

## 2023-09-13 PROCEDURE — 2580000003 HC RX 258: Performed by: CLINICAL NURSE SPECIALIST

## 2023-09-13 PROCEDURE — 96375 TX/PRO/DX INJ NEW DRUG ADDON: CPT

## 2023-09-13 PROCEDURE — 96365 THER/PROPH/DIAG IV INF INIT: CPT

## 2023-09-13 PROCEDURE — 83880 ASSAY OF NATRIURETIC PEPTIDE: CPT

## 2023-09-13 PROCEDURE — 6360000002 HC RX W HCPCS: Performed by: INTERNAL MEDICINE

## 2023-09-13 PROCEDURE — 85610 PROTHROMBIN TIME: CPT

## 2023-09-13 PROCEDURE — 96366 THER/PROPH/DIAG IV INF ADDON: CPT

## 2023-09-13 PROCEDURE — 2580000003 HC RX 258: Performed by: INTERNAL MEDICINE

## 2023-09-13 PROCEDURE — 99211 OFF/OP EST MAY X REQ PHY/QHP: CPT

## 2023-09-13 PROCEDURE — 6370000000 HC RX 637 (ALT 250 FOR IP): Performed by: NURSE PRACTITIONER

## 2023-09-13 PROCEDURE — 36415 COLL VENOUS BLD VENIPUNCTURE: CPT

## 2023-09-13 PROCEDURE — 2060000000 HC ICU INTERMEDIATE R&B

## 2023-09-13 PROCEDURE — 99233 SBSQ HOSP IP/OBS HIGH 50: CPT | Performed by: CLINICAL NURSE SPECIALIST

## 2023-09-13 PROCEDURE — 80048 BASIC METABOLIC PNL TOTAL CA: CPT

## 2023-09-13 RX ORDER — ACETAMINOPHEN 325 MG/1
650 TABLET ORAL EVERY 6 HOURS PRN
Status: DISCONTINUED | OUTPATIENT
Start: 2023-09-13 | End: 2023-09-19 | Stop reason: HOSPADM

## 2023-09-13 RX ORDER — METOCLOPRAMIDE 10 MG/1
5 TABLET ORAL 2 TIMES DAILY WITH MEALS
Status: DISCONTINUED | OUTPATIENT
Start: 2023-09-14 | End: 2023-09-19 | Stop reason: HOSPADM

## 2023-09-13 RX ORDER — ALBUTEROL SULFATE 90 UG/1
2 AEROSOL, METERED RESPIRATORY (INHALATION)
Status: DISCONTINUED | OUTPATIENT
Start: 2023-09-13 | End: 2023-09-13

## 2023-09-13 RX ORDER — POLYETHYLENE GLYCOL 3350 17 G/17G
17 POWDER, FOR SOLUTION ORAL DAILY PRN
Status: DISCONTINUED | OUTPATIENT
Start: 2023-09-13 | End: 2023-09-19 | Stop reason: HOSPADM

## 2023-09-13 RX ORDER — LEVOTHYROXINE SODIUM 88 UG/1
88 TABLET ORAL
Status: DISCONTINUED | OUTPATIENT
Start: 2023-09-14 | End: 2023-09-19 | Stop reason: HOSPADM

## 2023-09-13 RX ORDER — GLUCAGON 1 MG/ML
1 KIT INJECTION PRN
Status: DISCONTINUED | OUTPATIENT
Start: 2023-09-13 | End: 2023-09-19 | Stop reason: HOSPADM

## 2023-09-13 RX ORDER — AMIODARONE HYDROCHLORIDE 200 MG/1
100 TABLET ORAL DAILY
Status: DISCONTINUED | OUTPATIENT
Start: 2023-09-14 | End: 2023-09-15

## 2023-09-13 RX ORDER — OXYCODONE HYDROCHLORIDE 5 MG/1
10 TABLET ORAL EVERY 4 HOURS PRN
Status: DISCONTINUED | OUTPATIENT
Start: 2023-09-13 | End: 2023-09-19 | Stop reason: HOSPADM

## 2023-09-13 RX ORDER — SODIUM CHLORIDE 0.9 % (FLUSH) 0.9 %
5-40 SYRINGE (ML) INJECTION ONCE
Status: COMPLETED | OUTPATIENT
Start: 2023-09-13 | End: 2023-09-13

## 2023-09-13 RX ORDER — WARFARIN SODIUM 7.5 MG/1
7.5 TABLET ORAL
Status: COMPLETED | OUTPATIENT
Start: 2023-09-13 | End: 2023-09-13

## 2023-09-13 RX ORDER — SODIUM CHLORIDE 0.9 % (FLUSH) 0.9 %
5-40 SYRINGE (ML) INJECTION EVERY 12 HOURS SCHEDULED
Status: DISCONTINUED | OUTPATIENT
Start: 2023-09-13 | End: 2023-09-19 | Stop reason: HOSPADM

## 2023-09-13 RX ORDER — OXYCODONE HYDROCHLORIDE 5 MG/1
5 TABLET ORAL EVERY 4 HOURS PRN
Status: DISCONTINUED | OUTPATIENT
Start: 2023-09-13 | End: 2023-09-19 | Stop reason: HOSPADM

## 2023-09-13 RX ORDER — ONDANSETRON 4 MG/1
4 TABLET, ORALLY DISINTEGRATING ORAL EVERY 8 HOURS PRN
Status: DISCONTINUED | OUTPATIENT
Start: 2023-09-13 | End: 2023-09-19 | Stop reason: HOSPADM

## 2023-09-13 RX ORDER — ONDANSETRON 2 MG/ML
4 INJECTION INTRAMUSCULAR; INTRAVENOUS EVERY 6 HOURS PRN
Status: DISCONTINUED | OUTPATIENT
Start: 2023-09-13 | End: 2023-09-19 | Stop reason: HOSPADM

## 2023-09-13 RX ORDER — INSULIN LISPRO 100 [IU]/ML
0-4 INJECTION, SOLUTION INTRAVENOUS; SUBCUTANEOUS NIGHTLY
Status: DISCONTINUED | OUTPATIENT
Start: 2023-09-13 | End: 2023-09-19 | Stop reason: HOSPADM

## 2023-09-13 RX ORDER — FUROSEMIDE 10 MG/ML
120 INJECTION INTRAMUSCULAR; INTRAVENOUS ONCE
Status: COMPLETED | OUTPATIENT
Start: 2023-09-13 | End: 2023-09-13

## 2023-09-13 RX ORDER — SODIUM CHLORIDE 0.9 % (FLUSH) 0.9 %
5-40 SYRINGE (ML) INJECTION PRN
Status: DISCONTINUED | OUTPATIENT
Start: 2023-09-13 | End: 2023-09-19 | Stop reason: HOSPADM

## 2023-09-13 RX ORDER — ALBUTEROL SULFATE 90 UG/1
2 AEROSOL, METERED RESPIRATORY (INHALATION) EVERY 4 HOURS PRN
Status: DISCONTINUED | OUTPATIENT
Start: 2023-09-13 | End: 2023-09-19 | Stop reason: HOSPADM

## 2023-09-13 RX ORDER — INSULIN LISPRO 100 [IU]/ML
0-8 INJECTION, SOLUTION INTRAVENOUS; SUBCUTANEOUS
Status: DISCONTINUED | OUTPATIENT
Start: 2023-09-13 | End: 2023-09-19 | Stop reason: HOSPADM

## 2023-09-13 RX ORDER — WARFARIN SODIUM 2.5 MG/1
2.5 TABLET ORAL
COMMUNITY

## 2023-09-13 RX ORDER — OXYCODONE HYDROCHLORIDE 5 MG/1
5 TABLET ORAL EVERY 8 HOURS PRN
COMMUNITY

## 2023-09-13 RX ORDER — METOPROLOL SUCCINATE 50 MG/1
50 TABLET, EXTENDED RELEASE ORAL DAILY
Status: DISCONTINUED | OUTPATIENT
Start: 2023-09-14 | End: 2023-09-19 | Stop reason: HOSPADM

## 2023-09-13 RX ORDER — SODIUM CHLORIDE 9 MG/ML
INJECTION, SOLUTION INTRAVENOUS PRN
Status: DISCONTINUED | OUTPATIENT
Start: 2023-09-13 | End: 2023-09-19 | Stop reason: HOSPADM

## 2023-09-13 RX ORDER — LANOLIN ALCOHOL/MO/W.PET/CERES
500 CREAM (GRAM) TOPICAL DAILY
Status: DISCONTINUED | OUTPATIENT
Start: 2023-09-14 | End: 2023-09-19 | Stop reason: HOSPADM

## 2023-09-13 RX ORDER — SODIUM CHLORIDE 0.9 % (FLUSH) 0.9 %
5-40 SYRINGE (ML) INJECTION ONCE
Start: 2023-09-20 | End: 2023-09-20

## 2023-09-13 RX ORDER — INSULIN GLARGINE 100 [IU]/ML
16 INJECTION, SOLUTION SUBCUTANEOUS EVERY MORNING
Status: DISCONTINUED | OUTPATIENT
Start: 2023-09-14 | End: 2023-09-19 | Stop reason: HOSPADM

## 2023-09-13 RX ORDER — DEXTROSE MONOHYDRATE 100 MG/ML
INJECTION, SOLUTION INTRAVENOUS CONTINUOUS PRN
Status: DISCONTINUED | OUTPATIENT
Start: 2023-09-13 | End: 2023-09-19 | Stop reason: HOSPADM

## 2023-09-13 RX ORDER — MIDODRINE HYDROCHLORIDE 5 MG/1
5 TABLET ORAL
Status: DISCONTINUED | OUTPATIENT
Start: 2023-09-14 | End: 2023-09-19 | Stop reason: HOSPADM

## 2023-09-13 RX ORDER — SPIRONOLACTONE 25 MG/1
50 TABLET ORAL DAILY
Status: DISCONTINUED | OUTPATIENT
Start: 2023-09-14 | End: 2023-09-19 | Stop reason: HOSPADM

## 2023-09-13 RX ORDER — ACETAMINOPHEN 650 MG/1
650 SUPPOSITORY RECTAL EVERY 6 HOURS PRN
Status: DISCONTINUED | OUTPATIENT
Start: 2023-09-13 | End: 2023-09-19 | Stop reason: HOSPADM

## 2023-09-13 RX ORDER — ASPIRIN 81 MG/1
81 TABLET, CHEWABLE ORAL DAILY
Status: DISCONTINUED | OUTPATIENT
Start: 2023-09-14 | End: 2023-09-19 | Stop reason: HOSPADM

## 2023-09-13 RX ORDER — FUROSEMIDE 10 MG/ML
120 INJECTION INTRAMUSCULAR; INTRAVENOUS ONCE
Start: 2023-09-20 | End: 2023-09-20

## 2023-09-13 RX ORDER — PANTOPRAZOLE SODIUM 40 MG/1
40 TABLET, DELAYED RELEASE ORAL
Status: DISCONTINUED | OUTPATIENT
Start: 2023-09-14 | End: 2023-09-19 | Stop reason: HOSPADM

## 2023-09-13 RX ADMIN — WARFARIN SODIUM 7.5 MG: 7.5 TABLET ORAL at 22:00

## 2023-09-13 RX ADMIN — FUROSEMIDE 10 MG/HR: 10 INJECTION, SOLUTION INTRAMUSCULAR; INTRAVENOUS at 17:14

## 2023-09-13 RX ADMIN — Medication 10 ML: at 21:54

## 2023-09-13 RX ADMIN — FUROSEMIDE 120 MG: 10 INJECTION, SOLUTION INTRAMUSCULAR; INTRAVENOUS at 08:30

## 2023-09-13 RX ADMIN — Medication 10 ML: at 08:45

## 2023-09-13 RX ADMIN — FUROSEMIDE 10 MG/HR: 10 INJECTION, SOLUTION INTRAMUSCULAR; INTRAVENOUS at 18:47

## 2023-09-13 RX ADMIN — OXYCODONE HYDROCHLORIDE 10 MG: 5 TABLET ORAL at 21:30

## 2023-09-13 RX ADMIN — FUROSEMIDE 10 MG/HR: 10 INJECTION, SOLUTION INTRAMUSCULAR; INTRAVENOUS at 08:47

## 2023-09-13 ASSESSMENT — PAIN SCALES - GENERAL
PAINLEVEL_OUTOF10: 8
PAINLEVEL_OUTOF10: 0

## 2023-09-13 NOTE — ACP (ADVANCE CARE PLANNING)
Advanced Care Planning Note. Purpose of Encounter: Advanced care planning in light of hospitalization  Parties In Attendance: Patient,    Decisional Capacity: Yes  Subjective: Patient  understand that this conversation is to address long term care goal  Objective: hospital with acute systolic heart failure with severe aortic stenosis per cardiology not a candidate for valve replacement.   Discussed with patient with likely outcomes and prognosis  Goals of Care Determination: Patient would remain full code and will want to discuss further with family long-term care goals we will get palliative care consult  Code Status: full code  Time spent on Advanced care Plannin minutes  Advanced Care Planning Documents: documented patient's wishes, would like  Kaushal Gongora to make medical decisions if unable to make decisions    Nely Devine MD  2023 5:19 PM

## 2023-09-13 NOTE — PROGRESS NOTES
Spoke with Silvestre Pulido CNP. Pt to be direct Admit hospital,awaiting bed assignment.  Keep Lasix at 10mg/hr

## 2023-09-13 NOTE — H&P
HOSPITALISTS HISTORY AND PHYSICAL    9/13/2023 5:17 PM    Patient Information:  Jade Nieves is a 69 y.o. female [id number]  PCP:  Corina Roberson MD (Tel: [phone] )    Chief complaint:sob    History of Present Illness:  Jade Nieves is a 69 y.o. female with systolic heart failure with EF of 30% and severe aortic stenosis not a candidate for valve replacement. Patient was seen at the transfusion center and received on a primarily Lasix plus for transfusion of Lasix drip at 10 mg/h. And still has shortness of breath and increased edema than baseline. Patient currently denies any chest pain but is feeling short of breath no nausea vomiting no fevers no chills    REVIEW OF SYSTEMS:   Constitutional: Negative for fever,chills or night sweats  ENT: Negative for rhinorrhea, epistaxis, hoarseness, sore throat. Cardiovascular: Negative for chest pain, palpitations   Gastrointestinal: Negative for nausea, vomiting, diarrhea  Genitourinary: Negative for polyuria, dysuria   Hematologic/Lymphatic: Negative for bleeding tendency, easy bruising  Musculoskeletal: Negative for myalgias and arthralgias  Neurologic: Negative for confusion,dysarthria. Skin: Negative for itching,rash  Psychiatric: Negative for depression,anxiety, agitation. Endocrine: Negative for polydipsia,polyuria,heat /cold intolerance. Past Medical History:   has a past medical history of AS (aortic stenosis), Asthma, Atrial fibrillation ([address]), CHF (congestive heart failure) ([address]), Eosinophilia, Hemoptysis, HIGH CHOLESTEROL, Hx of blood clots, Hypertension, Irregular heart beat, Other specified gastritis without mention of hemorrhage, Palpitations, Skin cancer, Stage 3b chronic kidney disease (CKD) ([address]), and Type II or unspecified type diabetes mellitus without mention of complication, not stated as uncontrolled.      Past Surgical History:   has a

## 2023-09-13 NOTE — PROGRESS NOTES
Patient being directly admitted to [facility] by Dudley Martell NP. Lasix drip stopped, IV saline locked, patient taken to room 3384 in wheelchair with .

## 2023-09-13 NOTE — PROGRESS NOTES
Pt to be transferred to Room 8944. Report called. Reported Lasix gtt needs to be restarted at 10mg/hr.  Noemí Zeng CNP to be place order

## 2023-09-14 LAB
ANION GAP SERPL CALCULATED.3IONS-SCNC: 9 MMOL/L (ref 3–16)
BASOPHILS # BLD: 0 K/UL (ref 0–0.2)
BASOPHILS NFR BLD: 0.7 %
BUN SERPL-MCNC: 51 MG/DL (ref 7–20)
CALCIUM SERPL-MCNC: 8.8 MG/DL (ref 8.3–10.6)
CHLORIDE SERPL-SCNC: 99 MMOL/L (ref 99–110)
CO2 SERPL-SCNC: 29 MMOL/L (ref 21–32)
CREAT SERPL-MCNC: 2.3 MG/DL (ref 0.6–1.2)
DEPRECATED RDW RBC AUTO: 19.1 % (ref 12.4–15.4)
EOSINOPHIL # BLD: 0.2 K/UL (ref 0–0.6)
EOSINOPHIL NFR BLD: 3.5 %
GFR SERPLBLD CREATININE-BSD FMLA CKD-EPI: 21 ML/MIN/{1.73_M2}
GLUCOSE BLD-MCNC: 110 MG/DL (ref 70–99)
GLUCOSE BLD-MCNC: 113 MG/DL (ref 70–99)
GLUCOSE BLD-MCNC: 158 MG/DL (ref 70–99)
GLUCOSE BLD-MCNC: 159 MG/DL (ref 70–99)
GLUCOSE SERPL-MCNC: 67 MG/DL (ref 70–99)
HCT VFR BLD AUTO: 35.1 % (ref 36–48)
HGB BLD-MCNC: 11.3 G/DL (ref 12–16)
INR PPP: 1.65 (ref 0.84–1.16)
LYMPHOCYTES # BLD: 0.5 K/UL (ref 1–5.1)
LYMPHOCYTES NFR BLD: 10.4 %
MAGNESIUM SERPL-MCNC: 2.5 MG/DL (ref 1.8–2.4)
MCH RBC QN AUTO: 29.3 PG (ref 26–34)
MCHC RBC AUTO-ENTMCNC: 32.2 G/DL (ref 31–36)
MCV RBC AUTO: 90.9 FL (ref 80–100)
MONOCYTES # BLD: 0.7 K/UL (ref 0–1.3)
MONOCYTES NFR BLD: 13.9 %
NEUTROPHILS # BLD: 3.6 K/UL (ref 1.7–7.7)
NEUTROPHILS NFR BLD: 71.5 %
PERFORMED ON: ABNORMAL
PLATELET # BLD AUTO: 140 K/UL (ref 135–450)
PMV BLD AUTO: 8.1 FL (ref 5–10.5)
POTASSIUM SERPL-SCNC: 3.4 MMOL/L (ref 3.5–5.1)
PROTHROMBIN TIME: 19.5 SEC (ref 11.5–14.8)
RBC # BLD AUTO: 3.86 M/UL (ref 4–5.2)
SODIUM SERPL-SCNC: 137 MMOL/L (ref 136–145)
WBC # BLD AUTO: 5 K/UL (ref 4–11)

## 2023-09-14 PROCEDURE — 6370000000 HC RX 637 (ALT 250 FOR IP): Performed by: INTERNAL MEDICINE

## 2023-09-14 PROCEDURE — 97165 OT EVAL LOW COMPLEX 30 MIN: CPT

## 2023-09-14 PROCEDURE — 97530 THERAPEUTIC ACTIVITIES: CPT

## 2023-09-14 PROCEDURE — 2060000000 HC ICU INTERMEDIATE R&B

## 2023-09-14 PROCEDURE — 97116 GAIT TRAINING THERAPY: CPT

## 2023-09-14 PROCEDURE — 2580000003 HC RX 258: Performed by: INTERNAL MEDICINE

## 2023-09-14 PROCEDURE — 85610 PROTHROMBIN TIME: CPT

## 2023-09-14 PROCEDURE — 36415 COLL VENOUS BLD VENIPUNCTURE: CPT

## 2023-09-14 PROCEDURE — 6370000000 HC RX 637 (ALT 250 FOR IP): Performed by: NURSE PRACTITIONER

## 2023-09-14 PROCEDURE — 83735 ASSAY OF MAGNESIUM: CPT

## 2023-09-14 PROCEDURE — 6360000002 HC RX W HCPCS: Performed by: INTERNAL MEDICINE

## 2023-09-14 PROCEDURE — 97162 PT EVAL MOD COMPLEX 30 MIN: CPT

## 2023-09-14 PROCEDURE — 99223 1ST HOSP IP/OBS HIGH 75: CPT | Performed by: INTERNAL MEDICINE

## 2023-09-14 PROCEDURE — 6370000000 HC RX 637 (ALT 250 FOR IP): Performed by: HOSPITALIST

## 2023-09-14 PROCEDURE — 85025 COMPLETE CBC W/AUTO DIFF WBC: CPT

## 2023-09-14 PROCEDURE — 80048 BASIC METABOLIC PNL TOTAL CA: CPT

## 2023-09-14 RX ORDER — POTASSIUM CHLORIDE 20 MEQ/1
20 TABLET, EXTENDED RELEASE ORAL ONCE
Status: COMPLETED | OUTPATIENT
Start: 2023-09-14 | End: 2023-09-14

## 2023-09-14 RX ORDER — WARFARIN SODIUM 2.5 MG/1
2.5 TABLET ORAL
Status: DISCONTINUED | OUTPATIENT
Start: 2023-09-14 | End: 2023-09-19 | Stop reason: HOSPADM

## 2023-09-14 RX ORDER — METOLAZONE 2.5 MG/1
5 TABLET ORAL DAILY
Status: COMPLETED | OUTPATIENT
Start: 2023-09-14 | End: 2023-09-14

## 2023-09-14 RX ORDER — WARFARIN SODIUM 5 MG/1
5 TABLET ORAL
Status: DISCONTINUED | OUTPATIENT
Start: 2023-09-15 | End: 2023-09-18

## 2023-09-14 RX ADMIN — SPIRONOLACTONE 50 MG: 25 TABLET ORAL at 08:51

## 2023-09-14 RX ADMIN — METOLAZONE 5 MG: 2.5 TABLET ORAL at 16:00

## 2023-09-14 RX ADMIN — POTASSIUM CHLORIDE 20 MEQ: 1500 TABLET, EXTENDED RELEASE ORAL at 12:19

## 2023-09-14 RX ADMIN — POLYETHYLENE GLYCOL 3350 17 G: 17 POWDER, FOR SOLUTION ORAL at 12:23

## 2023-09-14 RX ADMIN — INSULIN GLARGINE 16 UNITS: 100 INJECTION, SOLUTION SUBCUTANEOUS at 08:51

## 2023-09-14 RX ADMIN — CYANOCOBALAMIN TAB 1000 MCG 500 MCG: 1000 TAB at 08:51

## 2023-09-14 RX ADMIN — LEVOTHYROXINE SODIUM 88 MCG: 0.09 TABLET ORAL at 05:22

## 2023-09-14 RX ADMIN — METOCLOPRAMIDE 5 MG: 10 TABLET ORAL at 08:48

## 2023-09-14 RX ADMIN — FUROSEMIDE 10 MG/HR: 10 INJECTION, SOLUTION INTRAMUSCULAR; INTRAVENOUS at 04:34

## 2023-09-14 RX ADMIN — METOPROLOL SUCCINATE 50 MG: 50 TABLET, EXTENDED RELEASE ORAL at 08:53

## 2023-09-14 RX ADMIN — METOCLOPRAMIDE 5 MG: 10 TABLET ORAL at 17:55

## 2023-09-14 RX ADMIN — MIDODRINE HYDROCHLORIDE 5 MG: 5 TABLET ORAL at 12:19

## 2023-09-14 RX ADMIN — WARFARIN SODIUM 2.5 MG: 2.5 TABLET ORAL at 17:56

## 2023-09-14 RX ADMIN — AMIODARONE HYDROCHLORIDE 100 MG: 200 TABLET ORAL at 08:51

## 2023-09-14 RX ADMIN — Medication 10 ML: at 09:49

## 2023-09-14 RX ADMIN — ASPIRIN 81 MG 81 MG: 81 TABLET ORAL at 08:51

## 2023-09-14 RX ADMIN — Medication 10 ML: at 20:36

## 2023-09-14 RX ADMIN — OXYCODONE HYDROCHLORIDE 10 MG: 5 TABLET ORAL at 04:35

## 2023-09-14 RX ADMIN — PANTOPRAZOLE SODIUM 40 MG: 40 TABLET, DELAYED RELEASE ORAL at 05:22

## 2023-09-14 RX ADMIN — MIDODRINE HYDROCHLORIDE 5 MG: 5 TABLET ORAL at 17:55

## 2023-09-14 ASSESSMENT — ENCOUNTER SYMPTOMS
COUGH: 1
SHORTNESS OF BREATH: 1
CHEST TIGHTNESS: 1
ABDOMINAL DISTENTION: 1

## 2023-09-14 ASSESSMENT — PAIN - FUNCTIONAL ASSESSMENT: PAIN_FUNCTIONAL_ASSESSMENT: ACTIVITIES ARE NOT PREVENTED

## 2023-09-14 ASSESSMENT — PAIN DESCRIPTION - LOCATION
LOCATION: BACK
LOCATION: BACK

## 2023-09-14 ASSESSMENT — PAIN SCALES - GENERAL
PAINLEVEL_OUTOF10: 6
PAINLEVEL_OUTOF10: 9

## 2023-09-14 ASSESSMENT — PAIN DESCRIPTION - DESCRIPTORS: DESCRIPTORS: ACHING

## 2023-09-14 NOTE — CONSULTS
[address] [place] [address] 1945    History:  Past Medical History:   Diagnosis Date    AS (aortic stenosis)     Asthma     Atrial fibrillation (HCC)     CHF (congestive heart failure) (HCC)     Eosinophilia     Hemoptysis     HIGH CHOLESTEROL     Hx of blood clots     Hypertension     Irregular heart beat     Other specified gastritis without mention of hemorrhage     Palpitations     Skin cancer     basal and squamous    Stage 3b chronic kidney disease (CKD) ([address] )     Type II or unspecified type diabetes mellitus without mention of complication, not stated as uncontrolled        ECHO:  5/25/23   Summary   Definity was used to assist with endocardial border delineation. Mild to moderate concentric left ventricular hypertrophy. Severely dilated   left ventricular cavity size. Moderate to severely diminished left   ventricular systolic function with an estimated ejection fraction of 30%. The left ventricular apex appears akinetic and aneurysmal without evidence   of thrombus with Definity. Global hypokinesis with more pronounced   hypokinesis of the anterior wall. Indeterminate diastolic function d/t mitral valve replacement. The right ventricle is severely dilated with moderately diminished systolic   function. Bi-atrial enlargement. The aortic valve is heavily calcified with reduced excursion. Low-flow,   low-gradient critically severe aortic stenosis with a PV of 3.88 m/s, MPG of   34 mmHg, and SERAFIN of 0.54 cm2. Mild aortic insufficiency. The 27 mm Medtronic Cinch tissue mitral valve appears well seated with a MPG   of 5 mmHg. Mild mitral regurgitation. Moderate tricuspid regurgitation with an estimated PASP of 60 mmHg. Mild pulmonic insufficiency. The main pulmonary and branch pulmonary arteries appear dilated. Pacemaker / ICD lead is visualized in the right heart.        ACE/ARB/ARNi: not on d/t CKD  BB: toprol xl 50 mg daily  Aldosterone
Clinical Pharmacy Note: Pharmacy to Dose Warfarin    Pharmacy consulted by Dr. Ramonita Jones to dose warfarin. Beatrice Jimenez is a 69 y.o. female  is receiving warfarin for indication: dvt/pe indefinite, afib. INR Goal Range: 2.0-3.0  Prior to admission warfarin dosing regimen: 5 mg MWF, 2.5 rest of week. INR today:   Lab Results   Component Value Date/Time    INR 1.59 09/13/2023 06:20 PM       Assessment/Plan:  INR is subtherapeutic on prior to admission dosing regimen. Possible concomitant drug-drug interactions include: amiodarone   Based on today's assessment, dose warfarin 7.5 once and reassess tomorrow. Daily INR is ordered. Pharmacy will continue to monitor and make adjustments to regimen as necessary.      Thank you for the consult,     Leatha Henry, [facility]
I.V.:100]  Out: 525 [Urine:525]  No intake/output data recorded. Palliative Medicine Interventions:    patient/family support  Goals of Care discussions with patient/surrogate  Spiritual Interventions: none         DATA:  Current labs in the epic chart reviewed as of 2023   Review of previous notes, admits, labs, radiology and testing relevant to this consult done in this chart today 2023    Medical Decision Making: The following items were considered in medical decision making:  Review of prior external note(s) from each unique source relevant to today's visit: Hospitalist, Case management, cardiology  Discussion of management or test with external physician/qualified health care professional: Hospitalist, Case management, Cardiology  Unique test results reviewed: CBC and BMP       Risk of Complications/Morbidity: High   Illness(es)/ Infection present that pose threat to bodily function. There is potential for severe exacerbation of condition/side effects of treatment. Therapy requires intensive monitoring for toxicity     Advanced Care Planning Note. Purpose of Encounter: Advanced care planning   Parties In Attendance: Patient and spouse  Decisional Capacity: Limited   Subjective: Patient is weak and tired  Objective: on 5L oxygen  Goals of Care Determination: Patient wants full support (CPR, vent, surgery, HD, trach, PEG)  Plan:  Palliative care consult   The Patient has the following current code status:    Code Status: Full Code  Time spent on Advanced care Plannin minutes  Advanced Care Planning Documents: No advanced directives on chart, Art is the POA.                    Signed By: Electronically signed by SOLEDAD Downing CNP on 2023 at 10:53 AM  Palliative Medicine     2023
significant weight gain/loss  ~establish with CHF nurse  ~outpatient follow-up with our CHF team    The patient was seen for > 75 minutes. I reviewed interval history, physical exam, review of data including labs, imaging, development and implementation of treatment plan and coordination of complex care. More than 50% of the time was devoted to counseling the patient on their diagnoses/treatments, as well as coordination of care with the other care teams      I appreciate the opportunity of cooperating in the care of this patient.     Kajal Martinez M.D., [facility]

## 2023-09-15 LAB
ANION GAP SERPL CALCULATED.3IONS-SCNC: 11 MMOL/L (ref 3–16)
BASOPHILS # BLD: 0.1 K/UL (ref 0–0.2)
BASOPHILS NFR BLD: 1.2 %
BUN SERPL-MCNC: 54 MG/DL (ref 7–20)
CALCIUM SERPL-MCNC: 8.8 MG/DL (ref 8.3–10.6)
CHLORIDE SERPL-SCNC: 95 MMOL/L (ref 99–110)
CO2 SERPL-SCNC: 29 MMOL/L (ref 21–32)
CREAT SERPL-MCNC: 2.7 MG/DL (ref 0.6–1.2)
DEPRECATED RDW RBC AUTO: 19.5 % (ref 12.4–15.4)
EOSINOPHIL # BLD: 0.1 K/UL (ref 0–0.6)
EOSINOPHIL NFR BLD: 2 %
GFR SERPLBLD CREATININE-BSD FMLA CKD-EPI: 18 ML/MIN/{1.73_M2}
GLUCOSE BLD-MCNC: 109 MG/DL (ref 70–99)
GLUCOSE BLD-MCNC: 130 MG/DL (ref 70–99)
GLUCOSE BLD-MCNC: 148 MG/DL (ref 70–99)
GLUCOSE BLD-MCNC: 150 MG/DL (ref 70–99)
GLUCOSE SERPL-MCNC: 114 MG/DL (ref 70–99)
HCT VFR BLD AUTO: 36 % (ref 36–48)
HGB BLD-MCNC: 11.7 G/DL (ref 12–16)
INR PPP: 2.13 (ref 0.84–1.16)
LYMPHOCYTES # BLD: 0.5 K/UL (ref 1–5.1)
LYMPHOCYTES NFR BLD: 8.8 %
MAGNESIUM SERPL-MCNC: 2.5 MG/DL (ref 1.8–2.4)
MCH RBC QN AUTO: 29.4 PG (ref 26–34)
MCHC RBC AUTO-ENTMCNC: 32.6 G/DL (ref 31–36)
MCV RBC AUTO: 90.3 FL (ref 80–100)
MONOCYTES # BLD: 0.7 K/UL (ref 0–1.3)
MONOCYTES NFR BLD: 13.1 %
NEUTROPHILS # BLD: 4.1 K/UL (ref 1.7–7.7)
NEUTROPHILS NFR BLD: 74.9 %
PERFORMED ON: ABNORMAL
PLATELET # BLD AUTO: 175 K/UL (ref 135–450)
PMV BLD AUTO: 8.5 FL (ref 5–10.5)
POTASSIUM SERPL-SCNC: 3.4 MMOL/L (ref 3.5–5.1)
PROTHROMBIN TIME: 23.7 SEC (ref 11.5–14.8)
RBC # BLD AUTO: 3.99 M/UL (ref 4–5.2)
SODIUM SERPL-SCNC: 135 MMOL/L (ref 136–145)
WBC # BLD AUTO: 5.4 K/UL (ref 4–11)

## 2023-09-15 PROCEDURE — 6370000000 HC RX 637 (ALT 250 FOR IP): Performed by: INTERNAL MEDICINE

## 2023-09-15 PROCEDURE — 2060000000 HC ICU INTERMEDIATE R&B

## 2023-09-15 PROCEDURE — 83735 ASSAY OF MAGNESIUM: CPT

## 2023-09-15 PROCEDURE — 85025 COMPLETE CBC W/AUTO DIFF WBC: CPT

## 2023-09-15 PROCEDURE — 6370000000 HC RX 637 (ALT 250 FOR IP): Performed by: HOSPITALIST

## 2023-09-15 PROCEDURE — 36415 COLL VENOUS BLD VENIPUNCTURE: CPT

## 2023-09-15 PROCEDURE — 85610 PROTHROMBIN TIME: CPT

## 2023-09-15 PROCEDURE — 6360000002 HC RX W HCPCS: Performed by: INTERNAL MEDICINE

## 2023-09-15 PROCEDURE — 6370000000 HC RX 637 (ALT 250 FOR IP): Performed by: NURSE PRACTITIONER

## 2023-09-15 PROCEDURE — 2580000003 HC RX 258: Performed by: INTERNAL MEDICINE

## 2023-09-15 PROCEDURE — 80048 BASIC METABOLIC PNL TOTAL CA: CPT

## 2023-09-15 PROCEDURE — 99232 SBSQ HOSP IP/OBS MODERATE 35: CPT | Performed by: NURSE PRACTITIONER

## 2023-09-15 RX ADMIN — FUROSEMIDE 15 MG/HR: 10 INJECTION, SOLUTION INTRAMUSCULAR; INTRAVENOUS at 22:19

## 2023-09-15 RX ADMIN — WARFARIN SODIUM 5 MG: 5 TABLET ORAL at 18:29

## 2023-09-15 RX ADMIN — MIDODRINE HYDROCHLORIDE 5 MG: 5 TABLET ORAL at 09:32

## 2023-09-15 RX ADMIN — SPIRONOLACTONE 50 MG: 25 TABLET ORAL at 09:30

## 2023-09-15 RX ADMIN — ONDANSETRON 4 MG: 4 TABLET, ORALLY DISINTEGRATING ORAL at 23:25

## 2023-09-15 RX ADMIN — PANTOPRAZOLE SODIUM 40 MG: 40 TABLET, DELAYED RELEASE ORAL at 05:37

## 2023-09-15 RX ADMIN — FUROSEMIDE 15 MG/HR: 10 INJECTION, SOLUTION INTRAMUSCULAR; INTRAVENOUS at 04:36

## 2023-09-15 RX ADMIN — LEVOTHYROXINE SODIUM 88 MCG: 0.09 TABLET ORAL at 05:37

## 2023-09-15 RX ADMIN — OXYCODONE HYDROCHLORIDE 5 MG: 5 TABLET ORAL at 11:25

## 2023-09-15 RX ADMIN — AMIODARONE HYDROCHLORIDE 100 MG: 200 TABLET ORAL at 09:31

## 2023-09-15 RX ADMIN — METOPROLOL SUCCINATE 50 MG: 50 TABLET, EXTENDED RELEASE ORAL at 09:31

## 2023-09-15 RX ADMIN — ASPIRIN 81 MG 81 MG: 81 TABLET ORAL at 09:31

## 2023-09-15 RX ADMIN — MIDODRINE HYDROCHLORIDE 5 MG: 5 TABLET ORAL at 11:25

## 2023-09-15 RX ADMIN — Medication 10 ML: at 09:33

## 2023-09-15 RX ADMIN — MIDODRINE HYDROCHLORIDE 5 MG: 5 TABLET ORAL at 18:29

## 2023-09-15 RX ADMIN — INSULIN GLARGINE 16 UNITS: 100 INJECTION, SOLUTION SUBCUTANEOUS at 09:33

## 2023-09-15 RX ADMIN — CYANOCOBALAMIN TAB 1000 MCG 500 MCG: 1000 TAB at 09:31

## 2023-09-15 RX ADMIN — FUROSEMIDE 15 MG/HR: 10 INJECTION, SOLUTION INTRAMUSCULAR; INTRAVENOUS at 13:55

## 2023-09-15 RX ADMIN — METOCLOPRAMIDE 5 MG: 10 TABLET ORAL at 09:34

## 2023-09-15 RX ADMIN — EMPAGLIFLOZIN 10 MG: 10 TABLET, FILM COATED ORAL at 09:32

## 2023-09-15 RX ADMIN — METOCLOPRAMIDE 5 MG: 10 TABLET ORAL at 18:29

## 2023-09-15 ASSESSMENT — PAIN SCALES - WONG BAKER
WONGBAKER_NUMERICALRESPONSE: 0

## 2023-09-15 ASSESSMENT — PAIN SCALES - GENERAL
PAINLEVEL_OUTOF10: 8
PAINLEVEL_OUTOF10: 0

## 2023-09-15 NOTE — ACP (ADVANCE CARE PLANNING)
Advanced Care Planning Note. Purpose of Encounter: Advanced care planning in light of acute on chronic systolic CHF  Parties In Attendance: Patient,   Decisional Capacity: Yes  Subjective: Patient has SOB and LE edema  Objective: Cr 2.7  Goals of Care Determination: Patient wants full support (CPR, vent, surgery, HD, trach, PEG)  Plan:  Lasix gtt, Cardio consult, Palliative care consult  Code Status: Full code   Time spent on Advanced care Plannin minutes  Advanced Care Planning Documents: Completed advanced directives on chart,  is the POA.     Yaneth Myrick MD  9/15/2023 5:34 PM

## 2023-09-16 LAB
ANION GAP SERPL CALCULATED.3IONS-SCNC: 11 MMOL/L (ref 3–16)
BASOPHILS # BLD: 0 K/UL (ref 0–0.2)
BASOPHILS NFR BLD: 1.2 %
BUN SERPL-MCNC: 59 MG/DL (ref 7–20)
CALCIUM SERPL-MCNC: 8.9 MG/DL (ref 8.3–10.6)
CHLORIDE SERPL-SCNC: 96 MMOL/L (ref 99–110)
CO2 SERPL-SCNC: 31 MMOL/L (ref 21–32)
CREAT SERPL-MCNC: 2.5 MG/DL (ref 0.6–1.2)
DEPRECATED RDW RBC AUTO: 19.3 % (ref 12.4–15.4)
EOSINOPHIL # BLD: 0.1 K/UL (ref 0–0.6)
EOSINOPHIL NFR BLD: 3.1 %
GFR SERPLBLD CREATININE-BSD FMLA CKD-EPI: 19 ML/MIN/{1.73_M2}
GLUCOSE BLD-MCNC: 129 MG/DL (ref 70–99)
GLUCOSE BLD-MCNC: 131 MG/DL (ref 70–99)
GLUCOSE BLD-MCNC: 134 MG/DL (ref 70–99)
GLUCOSE BLD-MCNC: 193 MG/DL (ref 70–99)
GLUCOSE SERPL-MCNC: 91 MG/DL (ref 70–99)
HCT VFR BLD AUTO: 36.2 % (ref 36–48)
HGB BLD-MCNC: 11.6 G/DL (ref 12–16)
INR PPP: 2.3 (ref 0.84–1.16)
LYMPHOCYTES # BLD: 0.6 K/UL (ref 1–5.1)
LYMPHOCYTES NFR BLD: 14.3 %
MAGNESIUM SERPL-MCNC: 2.4 MG/DL (ref 1.8–2.4)
MCH RBC QN AUTO: 28.8 PG (ref 26–34)
MCHC RBC AUTO-ENTMCNC: 32.1 G/DL (ref 31–36)
MCV RBC AUTO: 89.9 FL (ref 80–100)
MONOCYTES # BLD: 0.6 K/UL (ref 0–1.3)
MONOCYTES NFR BLD: 15 %
NEUTROPHILS # BLD: 2.8 K/UL (ref 1.7–7.7)
NEUTROPHILS NFR BLD: 66.4 %
NT-PROBNP SERPL-MCNC: ABNORMAL PG/ML (ref 0–449)
PERFORMED ON: ABNORMAL
PLATELET # BLD AUTO: 195 K/UL (ref 135–450)
PMV BLD AUTO: 8.7 FL (ref 5–10.5)
POTASSIUM SERPL-SCNC: 3.2 MMOL/L (ref 3.5–5.1)
PROTHROMBIN TIME: 25.2 SEC (ref 11.5–14.8)
RBC # BLD AUTO: 4.02 M/UL (ref 4–5.2)
SODIUM SERPL-SCNC: 138 MMOL/L (ref 136–145)
WBC # BLD AUTO: 4.1 K/UL (ref 4–11)

## 2023-09-16 PROCEDURE — 6370000000 HC RX 637 (ALT 250 FOR IP): Performed by: INTERNAL MEDICINE

## 2023-09-16 PROCEDURE — 6370000000 HC RX 637 (ALT 250 FOR IP): Performed by: NURSE PRACTITIONER

## 2023-09-16 PROCEDURE — 2580000003 HC RX 258: Performed by: INTERNAL MEDICINE

## 2023-09-16 PROCEDURE — 2060000000 HC ICU INTERMEDIATE R&B

## 2023-09-16 PROCEDURE — 80048 BASIC METABOLIC PNL TOTAL CA: CPT

## 2023-09-16 PROCEDURE — 99231 SBSQ HOSP IP/OBS SF/LOW 25: CPT | Performed by: NURSE PRACTITIONER

## 2023-09-16 PROCEDURE — 83735 ASSAY OF MAGNESIUM: CPT

## 2023-09-16 PROCEDURE — 36415 COLL VENOUS BLD VENIPUNCTURE: CPT

## 2023-09-16 PROCEDURE — 6370000000 HC RX 637 (ALT 250 FOR IP): Performed by: HOSPITALIST

## 2023-09-16 PROCEDURE — 83880 ASSAY OF NATRIURETIC PEPTIDE: CPT

## 2023-09-16 PROCEDURE — 6360000002 HC RX W HCPCS: Performed by: INTERNAL MEDICINE

## 2023-09-16 PROCEDURE — 85025 COMPLETE CBC W/AUTO DIFF WBC: CPT

## 2023-09-16 PROCEDURE — 85610 PROTHROMBIN TIME: CPT

## 2023-09-16 RX ORDER — POTASSIUM CHLORIDE 20 MEQ/1
40 TABLET, EXTENDED RELEASE ORAL ONCE
Status: COMPLETED | OUTPATIENT
Start: 2023-09-16 | End: 2023-09-16

## 2023-09-16 RX ORDER — DIAPER,BRIEF,INFANT-TODD,DISP
EACH MISCELLANEOUS 2 TIMES DAILY
Status: DISCONTINUED | OUTPATIENT
Start: 2023-09-16 | End: 2023-09-19 | Stop reason: HOSPADM

## 2023-09-16 RX ADMIN — METOPROLOL SUCCINATE 50 MG: 50 TABLET, EXTENDED RELEASE ORAL at 07:32

## 2023-09-16 RX ADMIN — ASPIRIN 81 MG 81 MG: 81 TABLET ORAL at 07:31

## 2023-09-16 RX ADMIN — WARFARIN SODIUM 2.5 MG: 2.5 TABLET ORAL at 17:01

## 2023-09-16 RX ADMIN — MIDODRINE HYDROCHLORIDE 5 MG: 5 TABLET ORAL at 07:32

## 2023-09-16 RX ADMIN — MIDODRINE HYDROCHLORIDE 5 MG: 5 TABLET ORAL at 17:01

## 2023-09-16 RX ADMIN — HYDROCORTISONE: 0.01 CREAM TOPICAL at 22:30

## 2023-09-16 RX ADMIN — FUROSEMIDE 15 MG/HR: 10 INJECTION, SOLUTION INTRAMUSCULAR; INTRAVENOUS at 10:07

## 2023-09-16 RX ADMIN — INSULIN GLARGINE 16 UNITS: 100 INJECTION, SOLUTION SUBCUTANEOUS at 07:50

## 2023-09-16 RX ADMIN — SPIRONOLACTONE 50 MG: 25 TABLET ORAL at 07:32

## 2023-09-16 RX ADMIN — METOCLOPRAMIDE 5 MG: 10 TABLET ORAL at 17:01

## 2023-09-16 RX ADMIN — OXYCODONE HYDROCHLORIDE 10 MG: 5 TABLET ORAL at 22:10

## 2023-09-16 RX ADMIN — FUROSEMIDE 15 MG/HR: 10 INJECTION, SOLUTION INTRAMUSCULAR; INTRAVENOUS at 18:54

## 2023-09-16 RX ADMIN — PANTOPRAZOLE SODIUM 40 MG: 40 TABLET, DELAYED RELEASE ORAL at 07:32

## 2023-09-16 RX ADMIN — CYANOCOBALAMIN TAB 1000 MCG 500 MCG: 1000 TAB at 07:32

## 2023-09-16 RX ADMIN — POTASSIUM CHLORIDE 40 MEQ: 1500 TABLET, EXTENDED RELEASE ORAL at 07:31

## 2023-09-16 RX ADMIN — MIDODRINE HYDROCHLORIDE 5 MG: 5 TABLET ORAL at 11:21

## 2023-09-16 RX ADMIN — FUROSEMIDE 15 MG/HR: 10 INJECTION, SOLUTION INTRAMUSCULAR; INTRAVENOUS at 05:01

## 2023-09-16 RX ADMIN — Medication 10 ML: at 07:32

## 2023-09-16 RX ADMIN — EMPAGLIFLOZIN 10 MG: 10 TABLET, FILM COATED ORAL at 07:32

## 2023-09-16 RX ADMIN — METOCLOPRAMIDE 5 MG: 10 TABLET ORAL at 07:32

## 2023-09-16 RX ADMIN — LEVOTHYROXINE SODIUM 88 MCG: 0.09 TABLET ORAL at 07:31

## 2023-09-16 RX ADMIN — POTASSIUM CHLORIDE 40 MEQ: 1500 TABLET, EXTENDED RELEASE ORAL at 11:21

## 2023-09-16 RX ADMIN — OXYCODONE HYDROCHLORIDE 5 MG: 5 TABLET ORAL at 04:34

## 2023-09-16 ASSESSMENT — PAIN DESCRIPTION - ORIENTATION: ORIENTATION: RIGHT;LEFT

## 2023-09-16 ASSESSMENT — PAIN DESCRIPTION - LOCATION
LOCATION: HEAD
LOCATION: LEG

## 2023-09-16 ASSESSMENT — PAIN DESCRIPTION - DESCRIPTORS
DESCRIPTORS: ACHING
DESCRIPTORS: ACHING;BURNING

## 2023-09-16 ASSESSMENT — PAIN SCALES - GENERAL
PAINLEVEL_OUTOF10: 8
PAINLEVEL_OUTOF10: 10
PAINLEVEL_OUTOF10: 0

## 2023-09-16 ASSESSMENT — PAIN SCALES - WONG BAKER
WONGBAKER_NUMERICALRESPONSE: 0

## 2023-09-17 LAB
ANION GAP SERPL CALCULATED.3IONS-SCNC: 15 MMOL/L (ref 3–16)
BASOPHILS # BLD: 0 K/UL (ref 0–0.2)
BASOPHILS NFR BLD: 0.9 %
BUN SERPL-MCNC: 64 MG/DL (ref 7–20)
CALCIUM SERPL-MCNC: 8.8 MG/DL (ref 8.3–10.6)
CHLORIDE SERPL-SCNC: 95 MMOL/L (ref 99–110)
CO2 SERPL-SCNC: 28 MMOL/L (ref 21–32)
CREAT SERPL-MCNC: 2.8 MG/DL (ref 0.6–1.2)
DEPRECATED RDW RBC AUTO: 19.4 % (ref 12.4–15.4)
EOSINOPHIL # BLD: 0.1 K/UL (ref 0–0.6)
EOSINOPHIL NFR BLD: 1.7 %
GFR SERPLBLD CREATININE-BSD FMLA CKD-EPI: 17 ML/MIN/{1.73_M2}
GLUCOSE BLD-MCNC: 102 MG/DL (ref 70–99)
GLUCOSE BLD-MCNC: 110 MG/DL (ref 70–99)
GLUCOSE BLD-MCNC: 117 MG/DL (ref 70–99)
GLUCOSE BLD-MCNC: 156 MG/DL (ref 70–99)
GLUCOSE SERPL-MCNC: 97 MG/DL (ref 70–99)
HCT VFR BLD AUTO: 37.9 % (ref 36–48)
HGB BLD-MCNC: 12.2 G/DL (ref 12–16)
INR PPP: 2.74 (ref 0.84–1.16)
LYMPHOCYTES # BLD: 0.5 K/UL (ref 1–5.1)
LYMPHOCYTES NFR BLD: 10.1 %
MAGNESIUM SERPL-MCNC: 2.7 MG/DL (ref 1.8–2.4)
MCH RBC QN AUTO: 28.9 PG (ref 26–34)
MCHC RBC AUTO-ENTMCNC: 32.1 G/DL (ref 31–36)
MCV RBC AUTO: 90.1 FL (ref 80–100)
MONOCYTES # BLD: 0.6 K/UL (ref 0–1.3)
MONOCYTES NFR BLD: 12.3 %
NEUTROPHILS # BLD: 3.8 K/UL (ref 1.7–7.7)
NEUTROPHILS NFR BLD: 75 %
PERFORMED ON: ABNORMAL
PLATELET # BLD AUTO: 177 K/UL (ref 135–450)
PMV BLD AUTO: 8.4 FL (ref 5–10.5)
POTASSIUM SERPL-SCNC: 3.1 MMOL/L (ref 3.5–5.1)
PROTHROMBIN TIME: 28.9 SEC (ref 11.5–14.8)
RBC # BLD AUTO: 4.21 M/UL (ref 4–5.2)
SODIUM SERPL-SCNC: 138 MMOL/L (ref 136–145)
WBC # BLD AUTO: 5.1 K/UL (ref 4–11)

## 2023-09-17 PROCEDURE — 83735 ASSAY OF MAGNESIUM: CPT

## 2023-09-17 PROCEDURE — 6370000000 HC RX 637 (ALT 250 FOR IP): Performed by: HOSPITALIST

## 2023-09-17 PROCEDURE — 85025 COMPLETE CBC W/AUTO DIFF WBC: CPT

## 2023-09-17 PROCEDURE — 2580000003 HC RX 258: Performed by: INTERNAL MEDICINE

## 2023-09-17 PROCEDURE — 6360000002 HC RX W HCPCS: Performed by: NURSE PRACTITIONER

## 2023-09-17 PROCEDURE — 99232 SBSQ HOSP IP/OBS MODERATE 35: CPT | Performed by: NURSE PRACTITIONER

## 2023-09-17 PROCEDURE — 2580000003 HC RX 258: Performed by: NURSE PRACTITIONER

## 2023-09-17 PROCEDURE — 6370000000 HC RX 637 (ALT 250 FOR IP): Performed by: INTERNAL MEDICINE

## 2023-09-17 PROCEDURE — 80048 BASIC METABOLIC PNL TOTAL CA: CPT

## 2023-09-17 PROCEDURE — 6370000000 HC RX 637 (ALT 250 FOR IP): Performed by: NURSE PRACTITIONER

## 2023-09-17 PROCEDURE — 6360000002 HC RX W HCPCS: Performed by: INTERNAL MEDICINE

## 2023-09-17 PROCEDURE — 36415 COLL VENOUS BLD VENIPUNCTURE: CPT

## 2023-09-17 PROCEDURE — 2060000000 HC ICU INTERMEDIATE R&B

## 2023-09-17 PROCEDURE — 85610 PROTHROMBIN TIME: CPT

## 2023-09-17 RX ORDER — POTASSIUM CHLORIDE 20 MEQ/1
40 TABLET, EXTENDED RELEASE ORAL
Status: COMPLETED | OUTPATIENT
Start: 2023-09-17 | End: 2023-09-17

## 2023-09-17 RX ADMIN — WARFARIN SODIUM 2.5 MG: 2.5 TABLET ORAL at 17:17

## 2023-09-17 RX ADMIN — HYDROCORTISONE: 0.01 CREAM TOPICAL at 07:38

## 2023-09-17 RX ADMIN — POTASSIUM CHLORIDE 40 MEQ: 1500 TABLET, EXTENDED RELEASE ORAL at 10:27

## 2023-09-17 RX ADMIN — POTASSIUM CHLORIDE 40 MEQ: 1500 TABLET, EXTENDED RELEASE ORAL at 12:06

## 2023-09-17 RX ADMIN — MIDODRINE HYDROCHLORIDE 5 MG: 5 TABLET ORAL at 12:06

## 2023-09-17 RX ADMIN — OXYCODONE HYDROCHLORIDE 5 MG: 5 TABLET ORAL at 07:36

## 2023-09-17 RX ADMIN — INSULIN GLARGINE 16 UNITS: 100 INJECTION, SOLUTION SUBCUTANEOUS at 07:38

## 2023-09-17 RX ADMIN — EMPAGLIFLOZIN 10 MG: 10 TABLET, FILM COATED ORAL at 07:36

## 2023-09-17 RX ADMIN — CYANOCOBALAMIN TAB 1000 MCG 500 MCG: 1000 TAB at 07:36

## 2023-09-17 RX ADMIN — LEVOTHYROXINE SODIUM 88 MCG: 0.09 TABLET ORAL at 07:38

## 2023-09-17 RX ADMIN — MIDODRINE HYDROCHLORIDE 5 MG: 5 TABLET ORAL at 17:17

## 2023-09-17 RX ADMIN — SPIRONOLACTONE 50 MG: 25 TABLET ORAL at 07:36

## 2023-09-17 RX ADMIN — METOCLOPRAMIDE 5 MG: 10 TABLET ORAL at 17:17

## 2023-09-17 RX ADMIN — POTASSIUM CHLORIDE 40 MEQ: 1500 TABLET, EXTENDED RELEASE ORAL at 08:47

## 2023-09-17 RX ADMIN — METOPROLOL SUCCINATE 50 MG: 50 TABLET, EXTENDED RELEASE ORAL at 07:36

## 2023-09-17 RX ADMIN — ONDANSETRON 4 MG: 4 TABLET, ORALLY DISINTEGRATING ORAL at 20:32

## 2023-09-17 RX ADMIN — METOCLOPRAMIDE 5 MG: 10 TABLET ORAL at 07:37

## 2023-09-17 RX ADMIN — FUROSEMIDE 10 MG/HR: 10 INJECTION, SOLUTION INTRAMUSCULAR; INTRAVENOUS at 17:18

## 2023-09-17 RX ADMIN — MIDODRINE HYDROCHLORIDE 5 MG: 5 TABLET ORAL at 07:38

## 2023-09-17 RX ADMIN — FUROSEMIDE 15 MG/HR: 10 INJECTION, SOLUTION INTRAMUSCULAR; INTRAVENOUS at 02:17

## 2023-09-17 RX ADMIN — PANTOPRAZOLE SODIUM 40 MG: 40 TABLET, DELAYED RELEASE ORAL at 07:37

## 2023-09-17 RX ADMIN — ASPIRIN 81 MG 81 MG: 81 TABLET ORAL at 07:36

## 2023-09-17 ASSESSMENT — PAIN DESCRIPTION - LOCATION: LOCATION: BACK

## 2023-09-17 ASSESSMENT — PAIN SCALES - GENERAL
PAINLEVEL_OUTOF10: 6
PAINLEVEL_OUTOF10: 0

## 2023-09-17 NOTE — ACP (ADVANCE CARE PLANNING)
Advanced Care Planning Note. Purpose of Encounter: Advanced care planning in light of acute on chronic systolic CHF  Parties In Attendance: Patient,   Decisional Capacity: Yes  Subjective: Patient has SOB and LE edema  Objective: Cr 2.7  Goals of Care Determination: Patient wants full support (CPR, vent, surgery, HD, trach, PEG)  Plan:  Lasix gtt, Cardio consult, Palliative care consult  Code Status: Full code   Time spent on Advanced care Plannin minutes  Advanced Care Planning Documents: Completed advanced directives on chart,  is the POA.     George Euceda MD  2023 3:42 PM

## 2023-09-18 ENCOUNTER — CARE COORDINATION (OUTPATIENT)
Dept: CARE COORDINATION | Age: 78
End: 2023-09-18

## 2023-09-18 PROBLEM — I38 ACUTE ON CHRONIC SYSTOLIC HEART FAILURE DUE TO VALVULAR DISEASE (HCC): Status: ACTIVE | Noted: 2019-01-15

## 2023-09-18 LAB
ANION GAP SERPL CALCULATED.3IONS-SCNC: 14 MMOL/L (ref 3–16)
BASOPHILS # BLD: 0 K/UL (ref 0–0.2)
BASOPHILS NFR BLD: 0.8 %
BUN SERPL-MCNC: 66 MG/DL (ref 7–20)
CALCIUM SERPL-MCNC: 9 MG/DL (ref 8.3–10.6)
CHLORIDE SERPL-SCNC: 98 MMOL/L (ref 99–110)
CO2 SERPL-SCNC: 28 MMOL/L (ref 21–32)
CREAT SERPL-MCNC: 2.6 MG/DL (ref 0.6–1.2)
DEPRECATED RDW RBC AUTO: 19.3 % (ref 12.4–15.4)
EOSINOPHIL # BLD: 0.1 K/UL (ref 0–0.6)
EOSINOPHIL NFR BLD: 2.2 %
GFR SERPLBLD CREATININE-BSD FMLA CKD-EPI: 18 ML/MIN/{1.73_M2}
GLUCOSE BLD-MCNC: 126 MG/DL (ref 70–99)
GLUCOSE BLD-MCNC: 139 MG/DL (ref 70–99)
GLUCOSE BLD-MCNC: 164 MG/DL (ref 70–99)
GLUCOSE BLD-MCNC: 85 MG/DL (ref 70–99)
GLUCOSE SERPL-MCNC: 89 MG/DL (ref 70–99)
HCT VFR BLD AUTO: 39 % (ref 36–48)
HGB BLD-MCNC: 12.5 G/DL (ref 12–16)
INR PPP: 2.92 (ref 0.84–1.16)
LYMPHOCYTES # BLD: 0.6 K/UL (ref 1–5.1)
LYMPHOCYTES NFR BLD: 12.7 %
MAGNESIUM SERPL-MCNC: 2.5 MG/DL (ref 1.8–2.4)
MCH RBC QN AUTO: 29 PG (ref 26–34)
MCHC RBC AUTO-ENTMCNC: 32.2 G/DL (ref 31–36)
MCV RBC AUTO: 90.1 FL (ref 80–100)
MONOCYTES # BLD: 0.6 K/UL (ref 0–1.3)
MONOCYTES NFR BLD: 11.9 %
NEUTROPHILS # BLD: 3.5 K/UL (ref 1.7–7.7)
NEUTROPHILS NFR BLD: 72.4 %
PERFORMED ON: ABNORMAL
PERFORMED ON: NORMAL
PLATELET # BLD AUTO: 198 K/UL (ref 135–450)
PMV BLD AUTO: 8.6 FL (ref 5–10.5)
POTASSIUM SERPL-SCNC: 3.4 MMOL/L (ref 3.5–5.1)
PROTHROMBIN TIME: 30.3 SEC (ref 11.5–14.8)
RBC # BLD AUTO: 4.33 M/UL (ref 4–5.2)
SODIUM SERPL-SCNC: 140 MMOL/L (ref 136–145)
WBC # BLD AUTO: 4.8 K/UL (ref 4–11)

## 2023-09-18 PROCEDURE — 6370000000 HC RX 637 (ALT 250 FOR IP): Performed by: INTERNAL MEDICINE

## 2023-09-18 PROCEDURE — 2060000000 HC ICU INTERMEDIATE R&B

## 2023-09-18 PROCEDURE — 36415 COLL VENOUS BLD VENIPUNCTURE: CPT

## 2023-09-18 PROCEDURE — 99233 SBSQ HOSP IP/OBS HIGH 50: CPT | Performed by: CLINICAL NURSE SPECIALIST

## 2023-09-18 PROCEDURE — 6370000000 HC RX 637 (ALT 250 FOR IP): Performed by: NURSE PRACTITIONER

## 2023-09-18 PROCEDURE — 85025 COMPLETE CBC W/AUTO DIFF WBC: CPT

## 2023-09-18 PROCEDURE — 97116 GAIT TRAINING THERAPY: CPT

## 2023-09-18 PROCEDURE — 85610 PROTHROMBIN TIME: CPT

## 2023-09-18 PROCEDURE — 84443 ASSAY THYROID STIM HORMONE: CPT

## 2023-09-18 PROCEDURE — 83735 ASSAY OF MAGNESIUM: CPT

## 2023-09-18 PROCEDURE — 2580000003 HC RX 258: Performed by: NURSE PRACTITIONER

## 2023-09-18 PROCEDURE — 2580000003 HC RX 258: Performed by: INTERNAL MEDICINE

## 2023-09-18 PROCEDURE — 80048 BASIC METABOLIC PNL TOTAL CA: CPT

## 2023-09-18 PROCEDURE — 97530 THERAPEUTIC ACTIVITIES: CPT

## 2023-09-18 PROCEDURE — 6360000002 HC RX W HCPCS: Performed by: NURSE PRACTITIONER

## 2023-09-18 RX ORDER — WARFARIN SODIUM 5 MG/1
5 TABLET ORAL
Status: DISCONTINUED | OUTPATIENT
Start: 2023-09-20 | End: 2023-09-19 | Stop reason: HOSPADM

## 2023-09-18 RX ORDER — WARFARIN SODIUM 2.5 MG/1
2.5 TABLET ORAL
Status: COMPLETED | OUTPATIENT
Start: 2023-09-18 | End: 2023-09-18

## 2023-09-18 RX ORDER — POTASSIUM CHLORIDE 20 MEQ/1
40 TABLET, EXTENDED RELEASE ORAL EVERY 4 HOURS
Status: COMPLETED | OUTPATIENT
Start: 2023-09-18 | End: 2023-09-18

## 2023-09-18 RX ADMIN — CYANOCOBALAMIN TAB 1000 MCG 500 MCG: 1000 TAB at 09:40

## 2023-09-18 RX ADMIN — POTASSIUM CHLORIDE 40 MEQ: 1500 TABLET, EXTENDED RELEASE ORAL at 16:24

## 2023-09-18 RX ADMIN — PANTOPRAZOLE SODIUM 40 MG: 40 TABLET, DELAYED RELEASE ORAL at 05:58

## 2023-09-18 RX ADMIN — METOCLOPRAMIDE 5 MG: 10 TABLET ORAL at 09:40

## 2023-09-18 RX ADMIN — Medication 10 ML: at 09:48

## 2023-09-18 RX ADMIN — INSULIN GLARGINE 16 UNITS: 100 INJECTION, SOLUTION SUBCUTANEOUS at 09:40

## 2023-09-18 RX ADMIN — MIDODRINE HYDROCHLORIDE 5 MG: 5 TABLET ORAL at 13:42

## 2023-09-18 RX ADMIN — MIDODRINE HYDROCHLORIDE 5 MG: 5 TABLET ORAL at 16:23

## 2023-09-18 RX ADMIN — HYDROCORTISONE: 0.01 CREAM TOPICAL at 20:22

## 2023-09-18 RX ADMIN — WARFARIN SODIUM 2.5 MG: 2.5 TABLET ORAL at 18:08

## 2023-09-18 RX ADMIN — ASPIRIN 81 MG 81 MG: 81 TABLET ORAL at 09:40

## 2023-09-18 RX ADMIN — MIDODRINE HYDROCHLORIDE 5 MG: 5 TABLET ORAL at 09:40

## 2023-09-18 RX ADMIN — SPIRONOLACTONE 50 MG: 25 TABLET ORAL at 09:40

## 2023-09-18 RX ADMIN — POTASSIUM CHLORIDE 40 MEQ: 1500 TABLET, EXTENDED RELEASE ORAL at 13:42

## 2023-09-18 RX ADMIN — FUROSEMIDE 10 MG/HR: 10 INJECTION, SOLUTION INTRAMUSCULAR; INTRAVENOUS at 20:24

## 2023-09-18 RX ADMIN — HYDROCORTISONE: 0.01 CREAM TOPICAL at 09:48

## 2023-09-18 RX ADMIN — LEVOTHYROXINE SODIUM 88 MCG: 0.09 TABLET ORAL at 05:58

## 2023-09-18 RX ADMIN — EMPAGLIFLOZIN 10 MG: 10 TABLET, FILM COATED ORAL at 09:40

## 2023-09-18 RX ADMIN — FUROSEMIDE 10 MG/HR: 10 INJECTION, SOLUTION INTRAMUSCULAR; INTRAVENOUS at 06:07

## 2023-09-18 RX ADMIN — OXYCODONE HYDROCHLORIDE 10 MG: 5 TABLET ORAL at 05:57

## 2023-09-18 RX ADMIN — Medication 10 ML: at 20:23

## 2023-09-18 RX ADMIN — METOPROLOL SUCCINATE 50 MG: 50 TABLET, EXTENDED RELEASE ORAL at 09:40

## 2023-09-18 RX ADMIN — METOCLOPRAMIDE 5 MG: 10 TABLET ORAL at 16:23

## 2023-09-18 RX ADMIN — OXYCODONE HYDROCHLORIDE 10 MG: 5 TABLET ORAL at 09:39

## 2023-09-18 ASSESSMENT — PAIN SCALES - GENERAL
PAINLEVEL_OUTOF10: 10
PAINLEVEL_OUTOF10: 10

## 2023-09-18 ASSESSMENT — PAIN DESCRIPTION - LOCATION: LOCATION: BACK

## 2023-09-18 ASSESSMENT — PAIN DESCRIPTION - DESCRIPTORS: DESCRIPTORS: ACHING

## 2023-09-18 ASSESSMENT — PAIN DESCRIPTION - ORIENTATION: ORIENTATION: RIGHT;LEFT;MID

## 2023-09-18 ASSESSMENT — PAIN - FUNCTIONAL ASSESSMENT: PAIN_FUNCTIONAL_ASSESSMENT: PREVENTS OR INTERFERES SOME ACTIVE ACTIVITIES AND ADLS

## 2023-09-18 NOTE — CARE COORDINATION
ACM did not contact patient for CM follow up call. Patient is currently IP at [facility]. ACM will follow up at a later date/time.

## 2023-09-19 ENCOUNTER — NURSE ONLY (OUTPATIENT)
Dept: CARDIOLOGY CLINIC | Age: 78
End: 2023-09-19

## 2023-09-19 ENCOUNTER — TELEPHONE (OUTPATIENT)
Dept: FAMILY MEDICINE CLINIC | Age: 78
End: 2023-09-19

## 2023-09-19 VITALS
WEIGHT: 184 LBS | RESPIRATION RATE: 16 BRPM | SYSTOLIC BLOOD PRESSURE: 105 MMHG | HEART RATE: 92 BPM | TEMPERATURE: 98.3 F | BODY MASS INDEX: 29.57 KG/M2 | DIASTOLIC BLOOD PRESSURE: 66 MMHG | HEIGHT: 66 IN | OXYGEN SATURATION: 95 %

## 2023-09-19 DIAGNOSIS — I42.9 CARDIOMYOPATHY, UNSPECIFIED TYPE (HCC): ICD-10-CM

## 2023-09-19 DIAGNOSIS — Z95.810 ICD (IMPLANTABLE CARDIOVERTER-DEFIBRILLATOR) IN PLACE: Primary | ICD-10-CM

## 2023-09-19 DIAGNOSIS — I25.5 ISCHEMIC CARDIOMYOPATHY: ICD-10-CM

## 2023-09-19 LAB
ANION GAP SERPL CALCULATED.3IONS-SCNC: 13 MMOL/L (ref 3–16)
BASOPHILS # BLD: 0 K/UL (ref 0–0.2)
BASOPHILS NFR BLD: 0.8 %
BUN SERPL-MCNC: 62 MG/DL (ref 7–20)
CALCIUM SERPL-MCNC: 9 MG/DL (ref 8.3–10.6)
CHLORIDE SERPL-SCNC: 99 MMOL/L (ref 99–110)
CO2 SERPL-SCNC: 27 MMOL/L (ref 21–32)
CREAT SERPL-MCNC: 2.5 MG/DL (ref 0.6–1.2)
DEPRECATED RDW RBC AUTO: 18.8 % (ref 12.4–15.4)
EOSINOPHIL # BLD: 0.1 K/UL (ref 0–0.6)
EOSINOPHIL NFR BLD: 2.1 %
GFR SERPLBLD CREATININE-BSD FMLA CKD-EPI: 19 ML/MIN/{1.73_M2}
GLUCOSE BLD-MCNC: 77 MG/DL (ref 70–99)
GLUCOSE BLD-MCNC: 90 MG/DL (ref 70–99)
GLUCOSE SERPL-MCNC: 108 MG/DL (ref 70–99)
HCT VFR BLD AUTO: 40.2 % (ref 36–48)
HGB BLD-MCNC: 12.6 G/DL (ref 12–16)
INR PPP: 2.99 (ref 0.84–1.16)
LYMPHOCYTES # BLD: 0.5 K/UL (ref 1–5.1)
LYMPHOCYTES NFR BLD: 9.1 %
MCH RBC QN AUTO: 28.6 PG (ref 26–34)
MCHC RBC AUTO-ENTMCNC: 31.4 G/DL (ref 31–36)
MCV RBC AUTO: 91 FL (ref 80–100)
MONOCYTES # BLD: 0.5 K/UL (ref 0–1.3)
MONOCYTES NFR BLD: 10 %
NEUTROPHILS # BLD: 4.1 K/UL (ref 1.7–7.7)
NEUTROPHILS NFR BLD: 78 %
PERFORMED ON: NORMAL
PERFORMED ON: NORMAL
PLATELET # BLD AUTO: 215 K/UL (ref 135–450)
PMV BLD AUTO: 8.5 FL (ref 5–10.5)
POTASSIUM SERPL-SCNC: 4.1 MMOL/L (ref 3.5–5.1)
PROTHROMBIN TIME: 30.8 SEC (ref 11.5–14.8)
RBC # BLD AUTO: 4.41 M/UL (ref 4–5.2)
SODIUM SERPL-SCNC: 139 MMOL/L (ref 136–145)
TSH SERPL DL<=0.005 MIU/L-ACNC: 1.16 UIU/ML (ref 0.27–4.2)
WBC # BLD AUTO: 5.3 K/UL (ref 4–11)

## 2023-09-19 PROCEDURE — 85025 COMPLETE CBC W/AUTO DIFF WBC: CPT

## 2023-09-19 PROCEDURE — 36415 COLL VENOUS BLD VENIPUNCTURE: CPT

## 2023-09-19 PROCEDURE — 6370000000 HC RX 637 (ALT 250 FOR IP): Performed by: INTERNAL MEDICINE

## 2023-09-19 PROCEDURE — 99233 SBSQ HOSP IP/OBS HIGH 50: CPT | Performed by: CLINICAL NURSE SPECIALIST

## 2023-09-19 PROCEDURE — 97110 THERAPEUTIC EXERCISES: CPT

## 2023-09-19 PROCEDURE — 85610 PROTHROMBIN TIME: CPT

## 2023-09-19 PROCEDURE — 80048 BASIC METABOLIC PNL TOTAL CA: CPT

## 2023-09-19 PROCEDURE — 97535 SELF CARE MNGMENT TRAINING: CPT

## 2023-09-19 RX ORDER — MORPHINE SULFATE 100 MG/5ML
10 SOLUTION ORAL
Qty: 30 ML | Refills: 0 | Status: SHIPPED | OUTPATIENT
Start: 2023-09-19 | End: 2023-09-22

## 2023-09-19 RX ORDER — MONTELUKAST SODIUM 10 MG/1
10 TABLET ORAL NIGHTLY
Status: DISCONTINUED | OUTPATIENT
Start: 2023-09-19 | End: 2023-09-19 | Stop reason: HOSPADM

## 2023-09-19 RX ORDER — LORAZEPAM 2 MG/ML
1 CONCENTRATE ORAL EVERY 8 HOURS PRN
Qty: 20 ML | Refills: 0 | Status: SHIPPED | OUTPATIENT
Start: 2023-09-19 | End: 2023-10-03

## 2023-09-19 RX ADMIN — MIDODRINE HYDROCHLORIDE 5 MG: 5 TABLET ORAL at 13:39

## 2023-09-19 RX ADMIN — EMPAGLIFLOZIN 10 MG: 10 TABLET, FILM COATED ORAL at 08:47

## 2023-09-19 RX ADMIN — METOPROLOL SUCCINATE 50 MG: 50 TABLET, EXTENDED RELEASE ORAL at 08:46

## 2023-09-19 RX ADMIN — SPIRONOLACTONE 50 MG: 25 TABLET ORAL at 08:46

## 2023-09-19 RX ADMIN — ASPIRIN 81 MG 81 MG: 81 TABLET ORAL at 08:44

## 2023-09-19 RX ADMIN — MIDODRINE HYDROCHLORIDE 5 MG: 5 TABLET ORAL at 08:46

## 2023-09-19 RX ADMIN — LEVOTHYROXINE SODIUM 88 MCG: 0.09 TABLET ORAL at 06:00

## 2023-09-19 RX ADMIN — PANTOPRAZOLE SODIUM 40 MG: 40 TABLET, DELAYED RELEASE ORAL at 06:00

## 2023-09-19 RX ADMIN — HYDROCORTISONE: 0.01 CREAM TOPICAL at 08:44

## 2023-09-19 RX ADMIN — CYANOCOBALAMIN TAB 1000 MCG 500 MCG: 1000 TAB at 08:46

## 2023-09-19 RX ADMIN — METOCLOPRAMIDE 5 MG: 10 TABLET ORAL at 08:45

## 2023-09-19 RX ADMIN — INSULIN GLARGINE 16 UNITS: 100 INJECTION, SOLUTION SUBCUTANEOUS at 08:47

## 2023-09-19 ASSESSMENT — PAIN SCALES - GENERAL
PAINLEVEL_OUTOF10: 0
PAINLEVEL_OUTOF10: 3
PAINLEVEL_OUTOF10: 3
PAINLEVEL_OUTOF10: 2
PAINLEVEL_OUTOF10: 0
PAINLEVEL_OUTOF10: 1

## 2023-09-19 ASSESSMENT — PAIN DESCRIPTION - DESCRIPTORS
DESCRIPTORS: ACHING
DESCRIPTORS: ACHING

## 2023-09-19 ASSESSMENT — ENCOUNTER SYMPTOMS
CHEST TIGHTNESS: 1
SHORTNESS OF BREATH: 1
ABDOMINAL DISTENTION: 1
COUGH: 1

## 2023-09-19 ASSESSMENT — PAIN DESCRIPTION - LOCATION
LOCATION: CHEST
LOCATION: CHEST

## 2023-09-19 NOTE — PLAN OF CARE
Problem: Discharge Planning  Goal: Discharge to home or other facility with appropriate resources  9/17/2023 1307 by Carmen Dacosta RN  Outcome: Progressing     Problem: Pain  Goal: Verbalizes/displays adequate comfort level or baseline comfort level  9/17/2023 1307 by Carmen Dacosta RN  Outcome: Progressing     Problem: Safety - Adult  Goal: Free from fall injury  9/17/2023 1307 by Carmen Dacosta RN  Outcome: Progressing     Problem: Cardiovascular - Adult  Goal: Maintains optimal cardiac output and hemodynamic stability  9/17/2023 1307 by Carmen Dacosta RN  Outcome: Progressing     Problem: Chronic Conditions and Co-morbidities  Goal: Patient's chronic conditions and co-morbidity symptoms are monitored and maintained or improved  9/17/2023 1307 by Carmen Dacosta RN  Outcome: Progressing     Problem: Skin/Tissue Integrity  Goal: Absence of new skin breakdown  Description: 1. Monitor for areas of redness and/or skin breakdown  2. Assess vascular access sites hourly  3. Every 4-6 hours minimum:  Change oxygen saturation probe site  4. Every 4-6 hours:  If on nasal continuous positive airway pressure, respiratory therapy assess nares and determine need for appliance change or resting period.   Outcome: Progressing
Problem: Discharge Planning  Goal: Discharge to home or other facility with appropriate resources  Outcome: Completed     Problem: Pain  Goal: Verbalizes/displays adequate comfort level or baseline comfort level  Outcome: Completed     Problem: Safety - Adult  Goal: Free from fall injury  Outcome: Completed     Problem: Cardiovascular - Adult  Goal: Maintains optimal cardiac output and hemodynamic stability  Outcome: Completed     Problem: Chronic Conditions and Co-morbidities  Goal: Patient's chronic conditions and co-morbidity symptoms are monitored and maintained or improved  Outcome: Completed     Problem: Skin/Tissue Integrity  Goal: Absence of new skin breakdown  Description: 1. Monitor for areas of redness and/or skin breakdown  2. Assess vascular access sites hourly  3. Every 4-6 hours minimum:  Change oxygen saturation probe site  4. Every 4-6 hours:  If on nasal continuous positive airway pressure, respiratory therapy assess nares and determine need for appliance change or resting period.   Outcome: Completed
Problem: Discharge Planning  Goal: Discharge to home or other facility with appropriate resources  Outcome: Progressing     Problem: Pain  Goal: Verbalizes/displays adequate comfort level or baseline comfort level  Outcome: Progressing
Problem: Discharge Planning  Goal: Discharge to home or other facility with appropriate resources  Outcome: Progressing     Problem: Pain  Goal: Verbalizes/displays adequate comfort level or baseline comfort level  Outcome: Progressing     Problem: Safety - Adult  Goal: Free from fall injury  Outcome: Progressing     Problem: Chronic Conditions and Co-morbidities  Goal: Patient's chronic conditions and co-morbidity symptoms are monitored and maintained or improved  Outcome: Progressing     Problem: Cardiovascular - Adult  Goal: Maintains optimal cardiac output and hemodynamic stability  Outcome: Progressing     Problem: Skin/Tissue Integrity  Goal: Absence of new skin breakdown  Description: 1. Monitor for areas of redness and/or skin breakdown  2. Assess vascular access sites hourly  3. Every 4-6 hours minimum:  Change oxygen saturation probe site  4. Every 4-6 hours:  If on nasal continuous positive airway pressure, respiratory therapy assess nares and determine need for appliance change or resting period.   Outcome: Progressing
Problem: Discharge Planning  Goal: Discharge to home or other facility with appropriate resources  Outcome: Progressing     Problem: Pain  Goal: Verbalizes/displays adequate comfort level or baseline comfort level  Outcome: Progressing     Problem: Safety - Adult  Goal: Free from fall injury  Outcome: Progressing     Problem: Chronic Conditions and Co-morbidities  Goal: Patient's chronic conditions and co-morbidity symptoms are monitored and maintained or improved  Outcome: Progressing     Problem: Cardiovascular - Adult  Goal: Maintains optimal cardiac output and hemodynamic stability  Outcome: Progressing     Problem: Skin/Tissue Integrity  Goal: Absence of new skin breakdown  Description: 1. Monitor for areas of redness and/or skin breakdown  2. Assess vascular access sites hourly  3. Every 4-6 hours minimum:  Change oxygen saturation probe site  4. Every 4-6 hours:  If on nasal continuous positive airway pressure, respiratory therapy assess nares and determine need for appliance change or resting period.   Outcome: Progressing
Problem: Discharge Planning  Goal: Discharge to home or other facility with appropriate resources  Outcome: Progressing  Flowsheets (Taken 9/15/2023 2150)  Discharge to home or other facility with appropriate resources:   Identify barriers to discharge with patient and caregiver   Arrange for needed discharge resources and transportation as appropriate   Identify discharge learning needs (meds, wound care, etc)   Refer to discharge planning if patient needs post-hospital services based on physician order or complex needs related to functional status, cognitive ability or social support system     Problem: Pain  Goal: Verbalizes/displays adequate comfort level or baseline comfort level  Outcome: Progressing  Flowsheets (Taken 9/17/2023 0818)  Verbalizes/displays adequate comfort level or baseline comfort level:   Encourage patient to monitor pain and request assistance   Assess pain using appropriate pain scale   Administer analgesics based on type and severity of pain and evaluate response  Note: Oxy given per request.     Problem: Safety - Adult  Goal: Free from fall injury  Outcome: Progressing  Flowsheets (Taken 9/17/2023 0818)  Free From Fall Injury: Instruct family/caregiver on patient safety     Problem: Chronic Conditions and Co-morbidities  Goal: Patient's chronic conditions and co-morbidity symptoms are monitored and maintained or improved  Outcome: Progressing  Flowsheets (Taken 9/15/2023 2150)  Care Plan - Patient's Chronic Conditions and Co-Morbidity Symptoms are Monitored and Maintained or Improved:   Monitor and assess patient's chronic conditions and comorbid symptoms for stability, deterioration, or improvement   Collaborate with multidisciplinary team to address chronic and comorbid conditions and prevent exacerbation or deterioration   Update acute care plan with appropriate goals if chronic or comorbid symptoms are exacerbated and prevent overall improvement and discharge     Problem:
Skin/Tissue Integrity  Goal: Absence of new skin breakdown  Description: 1. Monitor for areas of redness and/or skin breakdown  2. Assess vascular access sites hourly  3. Every 4-6 hours minimum:  Change oxygen saturation probe site  4. Every 4-6 hours:  If on nasal continuous positive airway pressure, respiratory therapy assess nares and determine need for appliance change or resting period.   Outcome: Progressing

## 2023-09-19 NOTE — CARE COORDINATION
Discharge Planning:     (STELLA) reviewed chart, patient failing outpatient lasix treatments, recently discharge from [address] for PT/OT, and speech. CM Spoke with [facility], Carolbecki Man in admission, she is able to accept patient again. CM to follow up with Patient for discharge planning needs.       Electronically signed by Nilsa Fortune on 9/14/2023 at 8:47 AM
Elisabeth served Dr Mark Dominguez for rollator with a seat and [address] per therapy recommendations. Per staff patient has decided to go with Hospice for a discharge POC so [address] and DME will be provided by their services.      Electronically signed by Hawk Ibrahim RN on 9/19/2023 at 12:31 PM
[facility] Wound Ostomy Continence Nurse  Consult Note       NAME:  Garcia Villafuerte  MEDICAL RECORD NUMBER:  [id number]  AGE: 69 y.o. GENDER: female  : 1945  TODAY'S DATE:  2023    Subjective   Reason for WOCN Evaluation and Assessment:  lower extremity edema       Garcia Villafuerte is a 69 y.o. female referred by:   [x] Physician  [x] Nursing  [] Other:     Wound Identification:  Wound Type: venous  Contributing Factors: edema, CHF    Wound History: no wounds at this time.  There is edema   Current Wound Care Treatment:  open to air    Patient Goal of Care:  [x] Wound Healing  [] Odor Control  [] Palliative Care  [] Pain Control   [] Other:         PAST MEDICAL HISTORY        Diagnosis Date    AS (aortic stenosis)     Asthma     Atrial fibrillation (HCC)     CHF (congestive heart failure) (HCC)     Eosinophilia     Hemoptysis     HIGH CHOLESTEROL     Hx of blood clots     Hypertension     Irregular heart beat     Other specified gastritis without mention of hemorrhage     Palpitations     Skin cancer     basal and squamous    Stage 3b chronic kidney disease (CKD) (HCC)     Type II or unspecified type diabetes mellitus without mention of complication, not stated as uncontrolled        PAST SURGICAL HISTORY    Past Surgical History:   Procedure Laterality Date    BRONCHOSCOPY  2016    Dr. Mitchell Lemus - brushings from [address] [place] [address]  2018    Dr. Sim Bradford and 5/3 2021    Cardiac cath, cardioversion and rafat     SECTION      X 2    CHOLECYSTECTOMY      COLONOSCOPY  2017    Dr. Selina Powell - sigmoid diverticulosis, polypectomies x3    COLONOSCOPY  2014    Dr. Selina Powell - sigmoid diverticulosis, polypectomies x3, internal hemorrhoids    COLONOSCOPY  10/10/2018    w/biopsy performed by Davin Matthew MD at [facility] N/A 2020    COLONOSCOPY DIAGNOSTIC performed by Jaqui Dave MD at [facility]
The Plan for Transition of Care is related to the following treatment goals of Heart failure ([address]) [A06.9]  Acute systolic heart failure ([address]) [Q48.44]    IF APPLICABLE: The Patient and/or patient representative Natalie Thompson and her family were provided with a choice of provider and agrees with the discharge plan. Freedom of choice list with basic dialogue that supports the patient's individualized plan of care/goals and shares the quality data associated with the providers was provided to:     Patient Representative Name:       The Patient and/or Patient Representative Agree with the Discharge Plan?   Yes    Phillip Narvaez  Case Management Department  Ph: [phone]

## 2023-09-19 NOTE — TELEPHONE ENCOUNTER
Tracy Galeas with [address] called and patient is coming onto there services today. They are wanting to know if you will continue to follow the patient while on there services with [facility]. [facility] at [phone]. Please advise.

## 2023-09-19 NOTE — DISCHARGE SUMMARY
Daily  Qty: 90 tablet, Refills: 1      montelukast (SINGULAIR) 10 MG tablet TAKE 1 TABLET NIGHTLY  Qty: 90 tablet, Refills: 1      metoprolol succinate (TOPROL XL) 50 MG extended release tablet TAKE 1 TABLET DAILY  Qty: 90 tablet, Refills: 1      nystatin (MYCOSTATIN) 915644 UNIT/ML suspension Take 5 mLs by mouth 4 times daily  Qty: 100 mL, Refills: 0    Associated Diagnoses: Thrush, oral      insulin glargine (LANTUS SOLOSTAR) 100 UNIT/ML injection pen Inject 16 Units into the skin every morning  Qty: 90 mL, Refills: 1      !! warfarin (COUMADIN) 5 MG tablet TAKE 1 TABLET DAILY  Qty: 100 tablet, Refills: 3      Blood Glucose Monitoring Suppl (FREESTYLE LITE) GAETANO 1 Device by Does not apply route 4 times daily      Insulin Pen Needle (BD PEN NEEDLE JANNET U/F) 32G X 4 MM MISC USE 1 PEN NEEDLE FOUR TIMES A DAY  Qty: 360 each, Refills: 3    Associated Diagnoses: Diabetes mellitus type 2 in obese (HCC)      ACETAMINOPHEN PO Take 500 mg by mouth every 6 hours as needed       omeprazole (PRILOSEC) 20 MG delayed release capsule Take 1 capsule by mouth daily      ondansetron (ZOFRAN) 4 MG tablet Take 1 tablet by mouth 3 times daily as needed for Nausea or Vomiting  Qty: 30 tablet, Refills: 0      aspirin 81 MG chewable tablet Take 1 tablet by mouth daily  Qty: 30 tablet, Refills: 3      vitamin B-12 500 MCG tablet Take 1 tablet by mouth daily  Qty: 30 tablet, Refills: 3       !! - Potential duplicate medications found. Please discuss with provider. Total time spent on discharge was 33 minutes in the examination, evaluation, counseling and review of medications and discharge plan.       Signed:    Juarez Pablo DO   9/19/2023  11:01 AM

## 2023-09-19 NOTE — DISCHARGE INSTR - COC
[facility] tissue valve    PAIN MANAGEMENT PROCEDURE N/A 12/18/2020    C6-C7 MIDLINE  EPIDURAL STEROID INJECTION WITH FLUOROSCOPY performed by Mazin Lantigua MD at [address] Bilateral 01/18/2021    BILATERAL T11 TRANSFORAMINAL EPIDURAL STEROID INJECTION WITH FLUOROSCOPY performed by Mazin Lantigua MD at [facility]      TRANSESOPHAGEAL ECHOCARDIOGRAM  08/21/2018    during CABG/MVR    TUNNELED VENOUS CATHETER PLACEMENT Left 08/23/2018    Dr. Mikey Chanel - IJ for HD---since removed    UPPER GASTROINTESTINAL ENDOSCOPY N/A 10/10/2018    w/biopsy performed by David Ritchie MD at [facility] LEFT Left 04/20/2023    US BREAST BIOPSY W LOC DEVICE 1ST LESION LEFT 4/20/2023 F ULTRASOUND       Immunization History:   Immunization History   Administered Date(s) Administered    COVID-19, MODERNA BLUE border, Primary or Immunocompromised, (age 12y+), IM, 100 mcg/0.5mL 01/31/2021, 03/15/2021, 03/15/2022    COVID-19, MODERNA Bivalent, (age 12y+), IM, 48 mcg/0.5 mL 10/27/2022    Influenza Virus Vaccine 09/26/2012, 12/15/2014, 12/16/2015, 12/27/2016    Influenza Whole 09/26/2012    Influenza, AFLURIA (age 1 yrs+), FLUZONE, (age 10 mo+), MDV, 0.5mL 12/27/2016    Influenza, AFLURIA, FLUZONE, (age 11-30 m), PF 12/18/2018, 09/23/2020, 11/04/2021    Influenza, FLUCELVAX, (age 10 mo+), MDCK, PF, 0.5mL 11/09/2022    Pneumococcal, PCV-13, PREVNAR 15, (age 6w+), IM, 0.5mL 04/15/2015    Pneumococcal, PPSV23, PNEUMOVAX 23, (age 2y+), SC/IM, 0.5mL 04/28/2016    TD 5LF, Kylah Postin, (age 7y+), IM, 0.5mL 07/11/2023       Active Problems:  Patient Active Problem List   Diagnosis Code    Anticoagulated on Coumadin Z79.01    Hypercholesteremia E78.00    Generalized osteoarthrosis, involving multiple sites 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

## 2023-09-20 ENCOUNTER — CARE COORDINATION (OUTPATIENT)
Dept: CARE COORDINATION | Age: 78
End: 2023-09-20

## 2023-09-20 NOTE — CARE COORDINATION
Patient has entered Hospice care at this time. [facility] will end CM outreach at this time.

## 2023-09-22 ENCOUNTER — TELEPHONE (OUTPATIENT)
Dept: FAMILY MEDICINE CLINIC | Age: 78
End: 2023-09-22

## 2023-09-22 NOTE — TELEPHONE ENCOUNTER
Snehal Crane with [address] wanted to inform you that the heart failure team will be managing pt's coumadin and heart failure medication. Also wanted to let you know that pt is almost out of the levothyroxine and this is not something they cover as it is not related to the terminal illness but she did call it in to the pharmacy.

## 2023-09-23 PROCEDURE — G2066 INTER DEVC REMOTE 30D: HCPCS | Performed by: CLINICAL NURSE SPECIALIST

## 2023-09-23 PROCEDURE — 93297 REM INTERROG DEV EVAL ICPMS: CPT | Performed by: CLINICAL NURSE SPECIALIST

## 2023-09-25 RX ORDER — LEVOTHYROXINE SODIUM 88 UG/1
88 TABLET ORAL DAILY
Qty: 90 TABLET | Refills: 1 | Status: SHIPPED | OUTPATIENT
Start: 2023-09-25

## 2023-09-27 ENCOUNTER — TELEPHONE (OUTPATIENT)
Dept: FAMILY MEDICINE CLINIC | Age: 78
End: 2023-09-27

## 2023-09-27 NOTE — TELEPHONE ENCOUNTER
Dr. Yudy Ron has been following pts hospice care/orders. Patient has multiple back fractures and is having increased pain today. She has oxycodone and morphine on hand at home. Robbie Gann wants to know if you want to adjust her medications or if you would like her to reach out to one of the hospice providers since Dr. Yudy Ron is not  here today.

## 2023-10-06 ENCOUNTER — TELEPHONE (OUTPATIENT)
Dept: FAMILY MEDICINE CLINIC | Age: 78
End: 2023-10-06

## 2023-10-06 RX ORDER — AMOXICILLIN 500 MG/1
1000 TABLET, FILM COATED ORAL 2 TIMES DAILY
Qty: 40 TABLET | Refills: 0 | Status: SHIPPED | OUTPATIENT
Start: 2023-10-06 | End: 2023-10-16

## 2023-10-06 NOTE — TELEPHONE ENCOUNTER
Sandhya Aguilar from [facility] called, for the past several days pt has had pretty significant cough, at times it is productive, but she is pretty tight in the chest.  All of the family has had the same and been treated with an ATB, Sandhya Aguilar is wanting to know if an ATB can be sent in for pt?     [facility]

## 2023-10-09 ENCOUNTER — TELEPHONE (OUTPATIENT)
Dept: FAMILY MEDICINE CLINIC | Age: 78
End: 2023-10-09

## 2023-10-09 RX ORDER — BENZONATATE 200 MG/1
200 CAPSULE ORAL 3 TIMES DAILY PRN
Qty: 20 CAPSULE | Refills: 0 | Status: SHIPPED | OUTPATIENT
Start: 2023-10-09 | End: 2023-10-16

## 2023-10-09 RX ORDER — AZITHROMYCIN 500 MG/1
500 TABLET, FILM COATED ORAL DAILY
Qty: 5 TABLET | Refills: 0 | Status: SHIPPED | OUTPATIENT
Start: 2023-10-09 | End: 2023-10-14

## 2023-10-09 NOTE — TELEPHONE ENCOUNTER
[facility] called and stated that the patient was started on amoxicillin on Friday but has not gotten any better, they were wondering if  could send a new antibiotic and cough medicine.       [facility]/pharmacy #[id number] [place] [address]   Phone:  [phone]  Fax:  [phone]    Please Advise

## 2023-10-26 ENCOUNTER — OFFICE VISIT (OUTPATIENT)
Dept: FAMILY MEDICINE CLINIC | Age: 78
End: 2023-10-26

## 2023-10-26 VITALS
TEMPERATURE: 97.2 F | DIASTOLIC BLOOD PRESSURE: 60 MMHG | HEART RATE: 63 BPM | WEIGHT: 187.13 LBS | OXYGEN SATURATION: 96 % | BODY MASS INDEX: 30.2 KG/M2 | SYSTOLIC BLOOD PRESSURE: 98 MMHG | RESPIRATION RATE: 12 BRPM

## 2023-10-26 DIAGNOSIS — N18.32 TYPE 2 DIABETES MELLITUS WITH STAGE 3B CHRONIC KIDNEY DISEASE, WITH LONG-TERM CURRENT USE OF INSULIN (HCC): ICD-10-CM

## 2023-10-26 DIAGNOSIS — Z79.4 TYPE 2 DIABETES MELLITUS WITH STAGE 3B CHRONIC KIDNEY DISEASE, WITH LONG-TERM CURRENT USE OF INSULIN (HCC): ICD-10-CM

## 2023-10-26 DIAGNOSIS — J20.9 ACUTE BRONCHITIS, UNSPECIFIED ORGANISM: ICD-10-CM

## 2023-10-26 DIAGNOSIS — I38 ACUTE ON CHRONIC SYSTOLIC HEART FAILURE DUE TO VALVULAR DISEASE (HCC): ICD-10-CM

## 2023-10-26 DIAGNOSIS — K14.0 GLOSSITIS: ICD-10-CM

## 2023-10-26 DIAGNOSIS — I95.9 HYPOTENSION, UNSPECIFIED HYPOTENSION TYPE: ICD-10-CM

## 2023-10-26 DIAGNOSIS — E87.70 HYPERVOLEMIA, UNSPECIFIED HYPERVOLEMIA TYPE: ICD-10-CM

## 2023-10-26 DIAGNOSIS — I48.20 CHRONIC ATRIAL FIBRILLATION (HCC): ICD-10-CM

## 2023-10-26 DIAGNOSIS — I35.0 SEVERE AORTIC STENOSIS: ICD-10-CM

## 2023-10-26 DIAGNOSIS — I50.21 ACUTE SYSTOLIC CONGESTIVE HEART FAILURE (HCC): Primary | ICD-10-CM

## 2023-10-26 DIAGNOSIS — N18.32 ACUTE RENAL FAILURE SUPERIMPOSED ON STAGE 3B CHRONIC KIDNEY DISEASE, UNSPECIFIED ACUTE RENAL FAILURE TYPE (HCC): ICD-10-CM

## 2023-10-26 DIAGNOSIS — I50.23 ACUTE ON CHRONIC SYSTOLIC HEART FAILURE DUE TO VALVULAR DISEASE (HCC): ICD-10-CM

## 2023-10-26 DIAGNOSIS — N17.9 ACUTE RENAL FAILURE SUPERIMPOSED ON STAGE 3B CHRONIC KIDNEY DISEASE, UNSPECIFIED ACUTE RENAL FAILURE TYPE (HCC): ICD-10-CM

## 2023-10-26 DIAGNOSIS — E11.22 TYPE 2 DIABETES MELLITUS WITH STAGE 3B CHRONIC KIDNEY DISEASE, WITH LONG-TERM CURRENT USE OF INSULIN (HCC): ICD-10-CM

## 2023-10-26 PROBLEM — M51.34 DEGENERATIVE DISC DISEASE, THORACIC: Status: RESOLVED | Noted: 2019-06-07 | Resolved: 2023-10-26

## 2023-10-26 PROBLEM — G89.29 CHRONIC MIDLINE THORACIC BACK PAIN: Status: RESOLVED | Noted: 2023-01-06 | Resolved: 2023-10-26

## 2023-10-26 PROBLEM — I50.9 ACUTE ON CHRONIC CONGESTIVE HEART FAILURE (HCC): Status: RESOLVED | Noted: 2023-05-24 | Resolved: 2023-10-26

## 2023-10-26 PROBLEM — Z95.1 S/P CABG X 3: Status: RESOLVED | Noted: 2018-08-22 | Resolved: 2023-10-26

## 2023-10-26 PROBLEM — M54.6 CHRONIC MIDLINE THORACIC BACK PAIN: Status: RESOLVED | Noted: 2023-01-06 | Resolved: 2023-10-26

## 2023-10-26 PROBLEM — J18.9 PNEUMONIA OF BOTH LOWER LOBES DUE TO INFECTIOUS ORGANISM: Status: RESOLVED | Noted: 2023-05-23 | Resolved: 2023-10-26

## 2023-10-26 RX ORDER — PYRIDOXINE HCL (VITAMIN B6) 50 MG
50 TABLET ORAL DAILY
Qty: 100 TABLET | Refills: 3
Start: 2023-10-26 | End: 2024-10-25

## 2023-10-26 RX ORDER — INSULIN GLARGINE 100 [IU]/ML
10 INJECTION, SOLUTION SUBCUTANEOUS EVERY MORNING
Qty: 90 ML | Refills: 1 | Status: SHIPPED | OUTPATIENT
Start: 2023-10-26

## 2023-10-26 RX ORDER — MONTELUKAST SODIUM 10 MG/1
10 TABLET ORAL NIGHTLY
Qty: 90 TABLET | Refills: 1 | Status: CANCELLED | OUTPATIENT
Start: 2023-10-26

## 2023-10-26 RX ORDER — METOPROLOL SUCCINATE 50 MG/1
50 TABLET, EXTENDED RELEASE ORAL DAILY
Qty: 90 TABLET | Refills: 1 | Status: CANCELLED | OUTPATIENT
Start: 2023-10-26

## 2023-10-26 NOTE — PROGRESS NOTES
Vaccine Information Sheet, \"Influenza - Inactivated\"  given to Sophie Welch, or parent/legal guardian of  Sophie Welch and verbalized understanding. Patient responses:    Have you ever had a reaction to a flu vaccine? No  Are you able to eat eggs without adverse effects? Yes  Do you have any current illness? No  Have you ever had Guillian Ord Syndrome? No    Flu vaccine given per order. Please see immunization tab.     Immunization(s) given during visit:     Immunizations Administered       Name Date Dose Route    Influenza, FLUCELVAX, (age 10 mo+), MDCK, PF, 0.5mL 10/26/2023 0.5 mL Intramuscular    Site: Deltoid- Right    Lot: [id number]    NDC: 07729-638-38

## 2023-10-26 NOTE — PROGRESS NOTES
Subjective:      Patient ID: Clint Cadena is a 69 y.o. female. CC: Patient presents for hospital follow-up. HPI pt is here for a follow up, med refill, test results in accompaniment of daughter. Patient by September 13 through [phone] with acute on chronic congestive heart failure secondary to severe aortic stenosis and cardiomyopathy. She had failed as an outpatient on IV Lasix and was admitted to the hospital.  Ultimately she was still having issues with failing treatment and she agreed with hospice care. She has been under hospice care since that time with good family management. She complains of tongue soreness. She recently had an by therapy for some bronchitis and was treated originally with amoxicillin and then she is asymptomatic. She feels her cough is better at this time. She was having a productive cough. Her weight continues to fluctuate but she does respond to the metolazone, she was then back again. Demadex has been increased to 40 mg twice a per hospice. Hospice is currently managing Coumadin. She is complained about tongue soreness.     Review of Systems  Patient Active Problem List   Diagnosis    Anticoagulated on Coumadin    Hypercholesteremia    Generalized osteoarthrosis, involving multiple sites    Eosinophilic gastritis    Paroxysmal SVT (supraventricular tachycardia)    B12 deficiency    History of pulmonary embolism    Multiple pulmonary nodules    Asthma    Atrial fibrillation (HCC)    Hypertension    Coronary artery disease    Mitral valve insufficiency    S/P MVR (mitral valve replacement)    S/P CABG x 3    Goiter    Primary osteoarthritis of both knees    Splenic infarct    Obesity, Class I, BMI 30-34.9    Acute on chronic systolic heart failure due to valvular disease (HCC)    Degenerative disc disease, thoracic    Persistent atrial fibrillation (HCC)    S/P MVR (mitral valve repair)    Ischemic cardiomyopathy    Occlusion and stenosis of bilateral carotid arteries

## 2023-11-01 ENCOUNTER — TELEPHONE (OUTPATIENT)
Dept: CARDIOLOGY CLINIC | Age: 78
End: 2023-11-01

## 2023-11-01 NOTE — TELEPHONE ENCOUNTER
Spoke to patient's spouse she is doing much better. He stated she is doing as well as can be expected. He stated they are doing fine and they dont have any requests at this time.

## 2023-11-01 NOTE — TELEPHONE ENCOUNTER
Please call and see how she is doing as her optival shows improvement in her fluid and a lot less AF  Anything I can do for them?

## 2023-11-10 PROCEDURE — 93296 REM INTERROG EVL PM/IDS: CPT | Performed by: INTERNAL MEDICINE

## 2023-11-10 PROCEDURE — 93295 DEV INTERROG REMOTE 1/2/MLT: CPT | Performed by: INTERNAL MEDICINE

## 2023-11-20 ENCOUNTER — TELEPHONE (OUTPATIENT)
Dept: FAMILY MEDICINE CLINIC | Age: 78
End: 2023-11-20

## 2023-11-21 RX ORDER — ERGOCALCIFEROL 1.25 MG/1
CAPSULE ORAL
Qty: 12 CAPSULE | Refills: 1 | Status: SHIPPED | OUTPATIENT
Start: 2023-11-21

## 2023-11-21 NOTE — TELEPHONE ENCOUNTER
[address] [place] [address]. Called into on call service with uncontrolled pain. Okay for 1000mg Tylenol every 6 hours PRN along with increase oxycodone to 15mg U0evidv if needed. She was already taking 500mg of Tylenol along with 10mg of oxycodone with unrelieved back pain.

## 2023-11-21 NOTE — TELEPHONE ENCOUNTER
Medication:   Requested Prescriptions     Pending Prescriptions Disp Refills    Continuous Blood Gluc Sensor (FREESTYLE LONG 2 SENSOR) MISC [Pharmacy Med Name: Anish Espino 2 SENSOR]  5     Sig: CHANGE EVERY 2 WEEKS     Last Filled:  # 2 with 5 refills on 6/12/23    Last appt: 10/26/2023   Next appt: 11/28/2023    Last OARRS:       3/1/2019     1:24 PM   RX Monitoring   Attestation The Prescription Monitoring Report for this patient was reviewed today.       10/24/23 1247 5/24/23 0607 5/18/23 1421 1/8/23 1005 8/29/22 1048 5/31/22 0923 5/17/22 1856     Hemoglobin A1C See comment % 6.3  6.4 CM  6.0 CM  5.7 CM  6.4 CM  7.0 CM  6.6 CM

## 2023-11-24 PROCEDURE — G2066 INTER DEVC REMOTE 30D: HCPCS | Performed by: CLINICAL NURSE SPECIALIST

## 2023-11-24 PROCEDURE — 93297 REM INTERROG DEV EVAL ICPMS: CPT | Performed by: CLINICAL NURSE SPECIALIST

## 2023-11-28 ENCOUNTER — OFFICE VISIT (OUTPATIENT)
Dept: FAMILY MEDICINE CLINIC | Age: 78
End: 2023-11-28

## 2023-11-28 VITALS
WEIGHT: 182.25 LBS | TEMPERATURE: 97 F | HEART RATE: 63 BPM | RESPIRATION RATE: 16 BRPM | OXYGEN SATURATION: 97 % | SYSTOLIC BLOOD PRESSURE: 100 MMHG | BODY MASS INDEX: 29.42 KG/M2 | DIASTOLIC BLOOD PRESSURE: 60 MMHG

## 2023-11-28 DIAGNOSIS — I48.19 PERSISTENT ATRIAL FIBRILLATION (HCC): ICD-10-CM

## 2023-11-28 DIAGNOSIS — I50.23 ACUTE ON CHRONIC SYSTOLIC HEART FAILURE DUE TO VALVULAR DISEASE (HCC): Primary | ICD-10-CM

## 2023-11-28 DIAGNOSIS — N18.32 ACUTE RENAL FAILURE SUPERIMPOSED ON STAGE 3B CHRONIC KIDNEY DISEASE, UNSPECIFIED ACUTE RENAL FAILURE TYPE (HCC): ICD-10-CM

## 2023-11-28 DIAGNOSIS — E11.22 TYPE 2 DIABETES MELLITUS WITH STAGE 3B CHRONIC KIDNEY DISEASE, WITH LONG-TERM CURRENT USE OF INSULIN (HCC): ICD-10-CM

## 2023-11-28 DIAGNOSIS — N17.9 ACUTE RENAL FAILURE SUPERIMPOSED ON STAGE 3B CHRONIC KIDNEY DISEASE, UNSPECIFIED ACUTE RENAL FAILURE TYPE (HCC): ICD-10-CM

## 2023-11-28 DIAGNOSIS — I38 ACUTE ON CHRONIC SYSTOLIC HEART FAILURE DUE TO VALVULAR DISEASE (HCC): Primary | ICD-10-CM

## 2023-11-28 DIAGNOSIS — N18.32 TYPE 2 DIABETES MELLITUS WITH STAGE 3B CHRONIC KIDNEY DISEASE, WITH LONG-TERM CURRENT USE OF INSULIN (HCC): ICD-10-CM

## 2023-11-28 DIAGNOSIS — Z79.4 TYPE 2 DIABETES MELLITUS WITH STAGE 3B CHRONIC KIDNEY DISEASE, WITH LONG-TERM CURRENT USE OF INSULIN (HCC): ICD-10-CM

## 2023-11-28 DIAGNOSIS — I35.0 SEVERE AORTIC STENOSIS: ICD-10-CM

## 2023-11-28 DIAGNOSIS — I48.20 CHRONIC ATRIAL FIBRILLATION (HCC): ICD-10-CM

## 2023-11-28 RX ORDER — INSULIN GLARGINE 100 [IU]/ML
6 INJECTION, SOLUTION SUBCUTANEOUS EVERY MORNING
Qty: 90 ML | Refills: 1 | Status: SHIPPED | OUTPATIENT
Start: 2023-11-28

## 2023-11-28 RX ORDER — PREDNISONE 10 MG/1
10 TABLET ORAL 2 TIMES DAILY
Qty: 10 TABLET | Refills: 0
Start: 2023-11-28 | End: 2023-12-03

## 2023-11-28 RX ORDER — TORSEMIDE 20 MG/1
40 TABLET ORAL DAILY
Qty: 180 TABLET | Refills: 1 | Status: SHIPPED
Start: 2023-11-28 | End: 2023-11-28 | Stop reason: DRUGHIGH

## 2023-11-28 RX ORDER — TORSEMIDE 20 MG/1
40 TABLET ORAL 2 TIMES DAILY
Qty: 180 TABLET | Refills: 1 | Status: SHIPPED
Start: 2023-11-28

## 2023-11-28 NOTE — PROGRESS NOTES
the right side and 2+ on the left side. Posterior tibial pulses are 2+ on the right side and 2+ on the left side. Heart sounds: Murmur heard. Systolic (RSB) murmur is present with a grade of 4/6. No friction rub. No gallop. Pulmonary:      Effort: Pulmonary effort is normal. No respiratory distress. Breath sounds: Rhonchi and rales present. No wheezing. Musculoskeletal:      Right lower leg: Edema (4 plus edema bilaterally) present. Left lower leg: Edema present. Neurological:      General: No focal deficit present. Mental Status: She is alert and oriented to person, place, and time. Sensory: Sensation is intact. Motor: Motor function is intact. Psychiatric:         Behavior: Behavior is cooperative. Assessment:      Rula Zhong was seen today for 1 month follow-up. Diagnoses and all orders for this visit:    Acute on chronic systolic heart failure due to valvular disease ([address])    Acute renal failure superimposed on stage 3b chronic kidney disease, unspecified acute renal failure type (HCC)    Chronic atrial fibrillation (HCC)    Persistent atrial fibrillation (HCC)    Severe aortic stenosis    Type 2 diabetes mellitus with stage 3b chronic kidney disease, with long-term current use of insulin ([address])    Other orders  -     insulin glargine (LANTUS SOLOSTAR) 100 UNIT/ML injection pen; Inject 6 Units into the skin every morning  -     Discontinue: torsemide (DEMADEX) 20 MG tablet; Take 2 tablets by mouth daily  -     predniSONE (DELTASONE) 10 MG tablet; Take 1 tablet by mouth 2 times daily for 5 days  -     torsemide (DEMADEX) 20 MG tablet; Take 2 tablets by mouth in the morning and at bedtime            Plan:      Reviewed labs and test results with patient.     Chronic renal failure appears to be better controlled at this time with a creatinine down to 2.0 from a high of 2.7 while she was in the hospital.  Agree with nephrology that she will probably need

## 2023-12-01 PROBLEM — N28.9 RENAL INSUFFICIENCY: Status: RESOLVED | Noted: 2023-09-13 | Resolved: 2023-12-01

## 2023-12-01 PROBLEM — I50.9 HEART FAILURE (HCC): Status: RESOLVED | Noted: 2023-09-13 | Resolved: 2023-12-01

## 2023-12-01 PROBLEM — K63.89 PNEUMATOSIS INTESTINALIS: Status: RESOLVED | Noted: 2020-05-10 | Resolved: 2023-12-01

## 2023-12-13 RX ORDER — CEPHALEXIN 500 MG/1
500 CAPSULE ORAL 3 TIMES DAILY
Qty: 30 CAPSULE | Refills: 0 | OUTPATIENT
Start: 2023-12-13 | End: 2023-12-23

## 2023-12-25 PROCEDURE — 93297 REM INTERROG DEV EVAL ICPMS: CPT | Performed by: CLINICAL NURSE SPECIALIST

## 2023-12-25 PROCEDURE — G2066 INTER DEVC REMOTE 30D: HCPCS | Performed by: CLINICAL NURSE SPECIALIST

## 2023-12-27 NOTE — TELEPHONE ENCOUNTER
Augustine Kirk advise.  Per WM pt should be between 2-3
Chaparro Clark RN from [address] [place] [address] [phone] states patient was referred to them for home care when she was discharged from hospital and was ordered to have PT/INR checked every 48hrs until the level is 3 to 3.5. As of 5/10/21  PT = 17.7  INR= 1.5    Patient is taking Warfarin 5mg daily and having the enoxaparin (LOVENOX) 100 MG/ML injected daily. Toi Roberto states the  is capable of doing the INR/PT check on his own but the  wants to hear from Dr Christen Reed is it ok for checking every 48hrs.     Please advise
See below
They will need to continue doing INRs every 2 days until her Coumadin level is therapeutic
Initial (On Arrival)

## 2024-01-08 ENCOUNTER — OFFICE VISIT (OUTPATIENT)
Dept: FAMILY MEDICINE CLINIC | Age: 79
End: 2024-01-08
Payer: MEDICARE

## 2024-01-08 VITALS
BODY MASS INDEX: 29.07 KG/M2 | SYSTOLIC BLOOD PRESSURE: 110 MMHG | OXYGEN SATURATION: 97 % | RESPIRATION RATE: 12 BRPM | DIASTOLIC BLOOD PRESSURE: 62 MMHG | WEIGHT: 180.13 LBS | HEART RATE: 67 BPM | TEMPERATURE: 97 F

## 2024-01-08 DIAGNOSIS — E03.9 ACQUIRED HYPOTHYROIDISM: ICD-10-CM

## 2024-01-08 DIAGNOSIS — E11.22 TYPE 2 DIABETES MELLITUS WITH STAGE 3B CHRONIC KIDNEY DISEASE, WITH LONG-TERM CURRENT USE OF INSULIN (HCC): ICD-10-CM

## 2024-01-08 DIAGNOSIS — I38 ACUTE ON CHRONIC SYSTOLIC HEART FAILURE DUE TO VALVULAR DISEASE (HCC): Primary | ICD-10-CM

## 2024-01-08 DIAGNOSIS — I48.20 CHRONIC ATRIAL FIBRILLATION (HCC): ICD-10-CM

## 2024-01-08 DIAGNOSIS — Z79.4 TYPE 2 DIABETES MELLITUS WITH STAGE 3B CHRONIC KIDNEY DISEASE, WITH LONG-TERM CURRENT USE OF INSULIN (HCC): ICD-10-CM

## 2024-01-08 DIAGNOSIS — N18.32 TYPE 2 DIABETES MELLITUS WITH STAGE 3B CHRONIC KIDNEY DISEASE, WITH LONG-TERM CURRENT USE OF INSULIN (HCC): ICD-10-CM

## 2024-01-08 DIAGNOSIS — I95.9 HYPOTENSION, UNSPECIFIED HYPOTENSION TYPE: ICD-10-CM

## 2024-01-08 DIAGNOSIS — I50.23 ACUTE ON CHRONIC SYSTOLIC HEART FAILURE DUE TO VALVULAR DISEASE (HCC): Primary | ICD-10-CM

## 2024-01-08 DIAGNOSIS — I50.32 CHRONIC DIASTOLIC HEART FAILURE (HCC): ICD-10-CM

## 2024-01-08 PROCEDURE — 1123F ACP DISCUSS/DSCN MKR DOCD: CPT | Performed by: FAMILY MEDICINE

## 2024-01-08 PROCEDURE — 3078F DIAST BP <80 MM HG: CPT | Performed by: FAMILY MEDICINE

## 2024-01-08 PROCEDURE — 3074F SYST BP LT 130 MM HG: CPT | Performed by: FAMILY MEDICINE

## 2024-01-08 PROCEDURE — 99214 OFFICE O/P EST MOD 30 MIN: CPT | Performed by: FAMILY MEDICINE

## 2024-01-08 RX ORDER — LEVOTHYROXINE SODIUM 88 UG/1
88 TABLET ORAL DAILY
Qty: 90 TABLET | Refills: 1 | Status: CANCELLED | OUTPATIENT
Start: 2024-01-08

## 2024-01-08 RX ORDER — INSULIN LISPRO 100 [IU]/ML
INJECTION, SOLUTION INTRAVENOUS; SUBCUTANEOUS
Qty: 15 ML | Refills: 2 | Status: SHIPPED | OUTPATIENT
Start: 2024-01-08

## 2024-01-08 RX ORDER — TORSEMIDE 20 MG/1
40 TABLET ORAL 2 TIMES DAILY
Qty: 180 TABLET | Refills: 1
Start: 2024-01-08

## 2024-01-08 ASSESSMENT — PATIENT HEALTH QUESTIONNAIRE - PHQ9
1. LITTLE INTEREST OR PLEASURE IN DOING THINGS: 0
SUM OF ALL RESPONSES TO PHQ QUESTIONS 1-9: 0
2. FEELING DOWN, DEPRESSED OR HOPELESS: 0
SUM OF ALL RESPONSES TO PHQ QUESTIONS 1-9: 0
SUM OF ALL RESPONSES TO PHQ QUESTIONS 1-9: 0
SUM OF ALL RESPONSES TO PHQ9 QUESTIONS 1 & 2: 0
SUM OF ALL RESPONSES TO PHQ QUESTIONS 1-9: 0

## 2024-01-08 NOTE — PROGRESS NOTES
Patient her with her male friend.  Patient states she wants to get pregnant and this male friend will donate his sperm for artificial insemination.  She also states she needs to get off her Geodon for this.  Patient scheduled with Dr. Fernandez for referrals. Nicci PONCE RN     tablet TAKE 1 TABLET DAILY 90 tablet 1    warfarin (COUMADIN) 5 MG tablet TAKE 1 TABLET DAILY (Patient taking differently: Take 1 tablet by mouth three times a week Review INR prior to administration.  Managed by PCP  5 mg Monday and Friday) 100 tablet 3    Blood Glucose Monitoring Suppl (FREESTYLE LITE) GAETANO 1 Device by Does not apply route 4 times daily      Insulin Pen Needle (BD PEN NEEDLE JANNET U/F) 32G X 4 MM MISC USE 1 PEN NEEDLE FOUR TIMES A  each 3    ACETAMINOPHEN PO Take 500 mg by mouth every 6 hours as needed       omeprazole (PRILOSEC) 20 MG delayed release capsule Take 1 capsule by mouth daily      ondansetron (ZOFRAN) 4 MG tablet Take 1 tablet by mouth 3 times daily as needed for Nausea or Vomiting 30 tablet 0    aspirin 81 MG chewable tablet Take 1 tablet by mouth daily 30 tablet 3    vitamin B-12 500 MCG tablet Take 1 tablet by mouth daily 30 tablet 3       Allergies   Allergen Reactions    Fqevxgmm-Qriqvvd-Nfpvcu [Fluocinolone] Shortness Of Breath    Ciprofloxacin Shortness Of Breath    Diovan [Valsartan] Shortness Of Breath    Flagyl [Metronidazole] Shortness Of Breath     Has taken diflucan at home 12/7/15    Metformin And Related [Metformin And Related] Shortness Of Breath    Benazepril      Other reaction(s): Not Recorded    Morphine      Bad reaction. \"makes her feel horrible\".     Saxagliptin      Other reaction(s): Not Recorded    Levaquin [Levofloxacin] Rash       Social History     Tobacco Use    Smoking status: Never     Passive exposure: Past    Smokeless tobacco: Never   Substance Use Topics    Alcohol use: Never       /62 (Site: Left Upper Arm, Position: Sitting, Cuff Size: Large Adult)   Pulse 67   Temp 97 °F (36.1 °C) (Infrared)   Resp 12   Wt 81.7 kg (180 lb 2 oz)   SpO2 97%   BMI 29.07 kg/m²      Objective:   Physical Exam  Constitutional:       General: She is not in acute distress.     Appearance: She is well-developed.   Neck:      Vascular: No carotid bruit.

## 2024-01-09 PROBLEM — I82.90 DEEP VEIN THROMBOSIS (DVT) OF NON-EXTREMITY VEIN: Status: RESOLVED | Noted: 2020-12-15 | Resolved: 2024-01-09

## 2024-01-09 RX ORDER — LEVOTHYROXINE SODIUM 88 UG/1
88 TABLET ORAL DAILY
Qty: 90 TABLET | Refills: 0 | Status: SHIPPED | OUTPATIENT
Start: 2024-01-09

## 2024-01-16 NOTE — PLAN OF CARE
HISTORY OF PRESENT ILLNESS:       Repair, Tendon, Lower Extremity - Right 12/1/2023      Pt is here today for Second post-operative followup of her Repair, Tendon, Lower Extremity - Right.  she is doing well and is continuing to wear boot. She remains NWB and is using a knee scooter for mobility. She denies pain to her right foot and ankle.  We have reviewed her findings and discussed plan of care and future treatment options, including the physical therapy plan.                                                                                     PHYSICAL EXAMINATION:     Incision sites healed well  No evidence of any erythema, infection or induration  Minimal effusion  2+ DP pulse                                                                                   ASSESSMENT:                                                                                                                                               1. Status post above, doing well.                                                                                                                               PLAN:       Wounds were treated today  DVT prophylaxis discussed  Therapy plan discussed in great detail today; all questions answered.             Ok to wb in boot  Cont pt   See back in 4 weeks                                                                                                                                    There are no Patient Instructions on file for this visit.       Problem: Pain:  Goal: Control of chronic pain  Control of chronic pain   Outcome: Ongoing      Problem: HEMODYNAMIC STATUS  Goal: Patient has stable vital signs and fluid balance  Outcome: Ongoing      Comments: VSS at 0010 with patient in bed. Pt was asleep at 2300 when dilaudid was next allowed per dose time. Pt initially declined pain medication and then immediately changed her mind to take the dose now instead of waiting for the pain to worsen. Pt c/o 4/10 pain now and given dilaudid at 0012. See [facility] & all flowsheets. Will continue to monitor.

## 2024-01-25 PROCEDURE — 93297 REM INTERROG DEV EVAL ICPMS: CPT | Performed by: CLINICAL NURSE SPECIALIST

## 2024-02-01 DIAGNOSIS — Z79.4 TYPE 2 DIABETES MELLITUS WITH STAGE 3B CHRONIC KIDNEY DISEASE, WITH LONG-TERM CURRENT USE OF INSULIN (HCC): ICD-10-CM

## 2024-02-01 DIAGNOSIS — N18.32 TYPE 2 DIABETES MELLITUS WITH STAGE 3B CHRONIC KIDNEY DISEASE, WITH LONG-TERM CURRENT USE OF INSULIN (HCC): ICD-10-CM

## 2024-02-01 DIAGNOSIS — E03.9 ACQUIRED HYPOTHYROIDISM: ICD-10-CM

## 2024-02-01 DIAGNOSIS — E11.22 TYPE 2 DIABETES MELLITUS WITH STAGE 3B CHRONIC KIDNEY DISEASE, WITH LONG-TERM CURRENT USE OF INSULIN (HCC): ICD-10-CM

## 2024-02-01 DIAGNOSIS — I50.32 CHRONIC DIASTOLIC HEART FAILURE (HCC): ICD-10-CM

## 2024-02-01 LAB
ANION GAP SERPL CALCULATED.3IONS-SCNC: 10 MMOL/L (ref 3–16)
BUN SERPL-MCNC: 47 MG/DL (ref 7–20)
CALCIUM SERPL-MCNC: 8.6 MG/DL (ref 8.3–10.6)
CHLORIDE SERPL-SCNC: 99 MMOL/L (ref 99–110)
CO2 SERPL-SCNC: 31 MMOL/L (ref 21–32)
CREAT SERPL-MCNC: 2 MG/DL (ref 0.6–1.2)
DEPRECATED RDW RBC AUTO: 14.6 % (ref 12.4–15.4)
GFR SERPLBLD CREATININE-BSD FMLA CKD-EPI: 25 ML/MIN/{1.73_M2}
GLUCOSE SERPL-MCNC: 103 MG/DL (ref 70–99)
HCT VFR BLD AUTO: 34.4 % (ref 36–48)
HGB BLD-MCNC: 11.7 G/DL (ref 12–16)
MCH RBC QN AUTO: 32 PG (ref 26–34)
MCHC RBC AUTO-ENTMCNC: 34.1 G/DL (ref 31–36)
MCV RBC AUTO: 94 FL (ref 80–100)
PLATELET # BLD AUTO: 251 K/UL (ref 135–450)
PMV BLD AUTO: 9.3 FL (ref 5–10.5)
POTASSIUM SERPL-SCNC: 4.3 MMOL/L (ref 3.5–5.1)
RBC # BLD AUTO: 3.66 M/UL (ref 4–5.2)
SODIUM SERPL-SCNC: 140 MMOL/L (ref 136–145)
TSH SERPL DL<=0.005 MIU/L-ACNC: 0.55 UIU/ML (ref 0.27–4.2)
WBC # BLD AUTO: 6 K/UL (ref 4–11)

## 2024-02-02 LAB
EST. AVERAGE GLUCOSE BLD GHB EST-MCNC: 122.6 MG/DL
HBA1C MFR BLD: 5.9 %

## 2024-02-25 PROCEDURE — 93297 REM INTERROG DEV EVAL ICPMS: CPT | Performed by: CLINICAL NURSE SPECIALIST

## 2024-03-05 ENCOUNTER — TELEPHONE (OUTPATIENT)
Dept: FAMILY MEDICINE CLINIC | Age: 79
End: 2024-03-05

## 2024-04-05 ENCOUNTER — TELEPHONE (OUTPATIENT)
Dept: FAMILY MEDICINE CLINIC | Age: 79
End: 2024-04-05

## 2024-04-05 RX ORDER — LEVOTHYROXINE SODIUM 88 UG/1
88 TABLET ORAL DAILY
Qty: 90 TABLET | Refills: 0 | Status: SHIPPED | OUTPATIENT
Start: 2024-04-05

## 2024-04-05 NOTE — TELEPHONE ENCOUNTER
Per the daughter patient is now on Hospice Care and does not need the order for:  levothyroxine (SYNTHROID) 88 MCG tablet [[id number]].     Please advise.

## 2024-04-25 NOTE — CARE COORDINATION
[address] [facility] Care Transitions Follow Up Call    N Care Coordinator contacted the patient by telephone to follow up after admission on 1/3/2023 and Ed visit on 2023. Verified name and  with patient as identifiers. Patient: Leida Griffith  Patient : 1945   MRN: [id number]  Reason for Admission: N/V/D/ ED visit for possible right radiusfracture  Discharge Date: 23 RARS: Readmission Risk Score: 20.3      Needs to be reviewed by the provider   Additional needs identified to be addressed with provider: No  none             Method of communication with provider: none. Spoke with Leida Griffith who reported  that she is getting there. Patient stated that she is now dealing with constipation and a fractured wrist. Patient stated that she is now drinking Mirlax. She stated that she will be going back to the doctor on Tuesday for a cast place to wrist. Patient denied cp, sob, cough, dizziness, headache, n/v, diarrhea, abdominal pains, fever, or chills. Patient report that appetite and fluid intake is good and denied any problems with bladder. Patient reported that he is taking all medications as ordered. Patient instructed to continue to monitor s/s, reporting any that may present to MD immediately for early intervention. Patient is agreeable to f/u calls. Addressed changes since last contact:   ED visit for possible right radius fracture  Discussed follow-up appointments. If no appointment was previously scheduled, appointment scheduling offered: Yes. Is follow up appointment scheduled within 7 days of discharge? Yes.     Follow Up  Future Appointments   Date Time Provider Tawana Young   2023 10:45 AM Bud Arthur [facility]   2023  9:45 AM Marsha Mendez CHECK [facility]   2023  1:30 PM SOLEDAD Saunders - CNS [facility]   2023 11:00 AM Fiona Velez MD [facility] [place] - [facility]   3/6/2023 11:00 AM Jomar WHITMAN CHECK [facility]   3/14/2023  8:30 AM Violeta De La Rosa APRN - CNS [facility]   3/14/2023  9:00 AM SCHEDULE, [place] DEVICE CHECK [facility]   4/10/2023 11:15 AM SCHEDULE, SHERYL OPTIVOL CHECK FF [facility]   5/22/2023  7:45 AM SCHEDULE, [place] OPTIVOL CHECK FF [facility]   6/26/2023 10:30 AM SCHEDULE, [facility] OPTIVOL CHECK FF [facility]   7/6/2023  8:45 AM Ember Carey MD FF [facility]   8/7/2023  8:00 AM SCHEDULE, [facility] OPTIVOL CHECK FF [facility]   9/18/2023  7:30 AM SCHEDULE, [facility] OPTIVOL CHECK FF [facility]   10/23/2023  7:30 AM SCHEDULE, SHERY[facility] OPTIVOL CHECK FF [facility]   11/27/2023  7:30 AM SCHEDULE, [place] OPTIVOL CHECK FF [facility]     Non-BS follow up appointment(s): jeramy      LPN Care Coordinator reviewed medical action plan with patient and discussed any barriers to care and/or understanding of plan of care after discharge. Discussed appropriate site of care based on symptoms and resources available to patient including: PCP  Specialist  When to call 911. The patient agrees to contact the PCP office for questions related to their healthcare. Patients top risk factors for readmission: medical condition-. Interventions to address risk factors: Assessment and support for treatment adherence and medication management-.         Care Transitions Subsequent and Final Call    Subsequent and Final Calls  Do you have any ongoing symptoms?: No  Have your medications changed?: No  Do you have any questions related to your medications?: No  Do you currently have any active services?: No  Are you currently active with any services?: Home Health  Do you have any needs or concerns that I can assist you with?: No  Care Transitions Interventions  No Identified Needs  Other Interventions:             LPN Care Coordinator provided contact information for future needs. Plan for follow-up call in 5-7 days based on severity of symptoms and risk factors.   Plan for next call: symptom management-,  self management-.    Kerrie Martin LPN Oriented - self; Oriented - place; Oriented - time

## 2025-04-01 NOTE — PROGRESS NOTES
Subjective:      Patient ID: Serafin Mccoy is a 69 y.o. female. CC: Patient presents for re-evaluation of chronic health problems including diabetes mellitus, chronic heart failure, hypokalemia, mesenteric atherosclerosis, and coronary disease. HPI pt is here for a follow up, med refill in accompaniment of . Patient was evaluated by cardiology yesterday and was planned to start [address] medication. She has had significant weight gain in the last several days is up 195 pounds from her baseline of about 190. She does respond typically to metolazone when this does happen. Patient still on orthopedic care in regards to wrist fracture. She is having issues not able to close her hand because of swelling and irritation. Her blood sugars at home now are stable with no hypoglycemic episodes. She is taking 10 units of Lantus insulin per day and sliding scale coverage. Eye exam current (within one year): Yes    Checks sugars at home: yes  Home blood sugar records: patient tests 4 time(s) per day  Any episodes of hypoglycemia?  No    Current medication use: taking as prescribed  Medication side effects: none     Current diet: in general, a \"healthy\" diet    Current exercise:not active     Review of Systems  Patient Active Problem List   Diagnosis    Anticoagulated on Coumadin    Hypercholesteremia    Generalized osteoarthrosis, involving multiple sites    Eosinophilic gastritis    Paroxysmal SVT (supraventricular tachycardia) (HCC)    B12 deficiency    History of pulmonary embolism    Multiple pulmonary nodules    Asthma    Atrial fibrillation (HCC)    Hypertension    Coronary artery disease of native artery of native heart with stable angina pectoris (HCC)    Nonrheumatic mitral valve regurgitation    S/P MVR (mitral valve replacement)    S/P CABG x 3    Goiter    Primary osteoarthritis of both knees    Splenic infarct    Obesity, Class I, BMI 30-34.9    Chronic systolic CHF (congestive heart failure), NYHA class 3 (HCC)    Degenerative disc disease, thoracic    Persistent atrial fibrillation (HCC)    S/P MVR (mitral valve repair)    Ischemic cardiomyopathy    Occlusion and stenosis of bilateral carotid arteries    Pneumatosis intestinalis    Aortic valve stenosis    Deep vein thrombosis (DVT) of non-extremity vein    Acute on chronic systolic heart failure due to valvular disease (HCC)    Stage 3b chronic kidney disease (HCC)    Acute kidney injury superimposed on CKD ([address]    Chronic diastolic heart failure (HCC)    Atherosclerosis of superior mesenteric artery ([address]    ICD (implantable cardioverter-defibrillator) in place    Type 2 diabetes mellitus with stage 3b chronic kidney disease, with long-term current use of insulin ([address]    Acquired hypothyroidism    Hypercoagulable state, secondary ([address]    Chronic midline thoracic back pain    Chronic Compression fracture of thoracic vertebra (HCC)    Hyponatremia    Lumbar stenosis    Cardiomyopathy ([address]    Ileus ([address]    Gastroparesis due to DM [facility])       Outpatient Medications Marked as Taking for the 3/17/23 encounter (Office Visit) with Lisseth Alicia MD   Medication Sig Dispense Refill    dapagliflozin (FARXIGA) 10 MG tablet Take 1 tablet by mouth every morning 14 tablet 0    vitamin D (ERGOCALCIFEROL) 1.25 MG (39230 UT) CAPS capsule TAKE 1 CAPSULE BY MOUTH ONCE WEEKLY 12 capsule 1    FREESTYLE LITE strip USE TO TEST BLOOD SUGAR FOUR TIMES A  strip 3    levothyroxine (SYNTHROID) 88 MCG tablet Take 1 tablet by mouth Daily 90 tablet 1    insulin lispro, 1 Unit Dial, (HUMALOG KWIKPEN) 100 UNIT/ML SOPN USE SLIDING SCALE, 140-199, 2 UNITS, 200-249, 3 UNITS, 250-300, 4 UNITS, GREATER THAN 300, INJECT 5 UNITS 15 mL 2    predniSONE (DELTASONE) 10 MG tablet TAKE 1 TABLET AS NEEDED (EOSINOPHILIC GASTRITIS) 779 tablet 0    oxyCODONE (ROXICODONE) 5 MG immediate release tablet Take 1 tablet by mouth 3 times daily as needed for Pain for up to 30 days.  90 tablet 0 torsemide (DEMADEX) 20 MG tablet Take 2 tablets by mouth daily 90 tablet 1    metOLazone (ZAROXOLYN) 2.5 MG tablet TAKE 1 TABLET ON TUESDAY AND FRIDAY AND AS DIRECTED 30 tablet 1    montelukast (SINGULAIR) 10 MG tablet TAKE 1 TABLET NIGHTLY 90 tablet 1    metoprolol succinate (TOPROL XL) 50 MG extended release tablet TAKE 1 TABLET DAILY 90 tablet 1    potassium chloride (KLOR-CON M) 10 MEQ extended release tablet TAKE 2 TABLET DAILY 180 tablet 1    metoclopramide (REGLAN) 5 MG tablet Take 1 tablet by mouth 2 times daily (with meals) 60 tablet 3    nystatin (MYCOSTATIN) 324688 UNIT/ML suspension Take 5 mLs by mouth 4 times daily 100 mL 0    insulin glargine (LANTUS SOLOSTAR) 100 UNIT/ML injection pen Inject 16 Units into the skin every morning (Patient taking differently: Inject 10-15 Units into the skin every morning) 90 mL 1    midodrine (PROAMATINE) 2.5 MG tablet Take 1 tablet by mouth 3 times daily 90 tablet 2    calcitRIOL (ROCALTROL) 0.25 MCG capsule 1 tablet Monday, Wednesday and Friday (Patient taking differently: 0.25 mcg 1 tablet 6 days, skipping Tuesdays) 30 capsule 3    warfarin (COUMADIN) 5 MG tablet TAKE 1 TABLET DAILY (Patient taking differently: Review INR prior to administration.   Managed by PCP) 100 tablet 3    amiodarone (CORDARONE) 200 MG tablet TAKE 1 TABLET DAILY (Patient taking differently: Take 100 mg by mouth daily) 60 tablet 5    albuterol sulfate  (90 Base) MCG/ACT inhaler USE 2 INHALATIONS EVERY 6 HOURS AS NEEDED FOR WHEEZING 25.5 g 2    FreeStyle Lancets MISC 1 each by Does not apply route 4 times daily 200 each 5    Blood Glucose Monitoring Suppl (FREESTYLE LITE) GAETANO 1 Device by Does not apply route 4 times daily      Insulin Pen Needle (BD PEN NEEDLE JANNET U/F) 32G X 4 MM MISC USE 1 PEN NEEDLE FOUR TIMES A  each 3    ACETAMINOPHEN PO Take 500 mg by mouth every 6 hours as needed       polyethylene glycol (GLYCOLAX) 17 GM/SCOOP powder Take 17 g by mouth as needed omeprazole (PRILOSEC) 20 MG delayed release capsule Take 20 mg by mouth daily      ondansetron (ZOFRAN) 4 MG tablet Take 1 tablet by mouth 3 times daily as needed for Nausea or Vomiting 30 tablet 0    aspirin 81 MG chewable tablet Take 1 tablet by mouth daily 30 tablet 3    vitamin B-12 500 MCG tablet Take 1 tablet by mouth daily 30 tablet 3       Allergies   Allergen Reactions    Dxefesdl-Iqbnrtz-Ptljzx [Fluocinolone] Shortness Of Breath    Ciprofloxacin Shortness Of Breath    Diovan [Valsartan] Shortness Of Breath    Flagyl [Metronidazole] Shortness Of Breath     Has taken diflucan at home 12/7/15    Metformin And Related [Metformin And Related] Shortness Of Breath    Benazepril      Other reaction(s): Not Recorded    Morphine      Bad reaction. \"makes her feel horrible\". Saxagliptin      Other reaction(s): Not Recorded    Levaquin [Levofloxacin] Rash       Social History     Tobacco Use    Smoking status: Never    Smokeless tobacco: Never   Substance Use Topics    Alcohol use: No       /70 (Site: Left Upper Arm, Position: Sitting, Cuff Size: Large Adult)   Pulse 88   Temp 97.2 °F (36.2 °C) (Infrared)   Resp 16   Wt 199 lb (90.3 kg)   SpO2 96%   BMI 32.12 kg/m²      Objective:   Physical Exam  Constitutional:       General: She is not in acute distress. Appearance: She is well-developed. Neck:      Vascular: No carotid bruit. Cardiovascular:      Rate and Rhythm: Normal rate. Rhythm irregularly irregular. Pulses:           Dorsalis pedis pulses are 2+ on the right side and 2+ on the left side. Posterior tibial pulses are 2+ on the right side and 2+ on the left side. Heart sounds: Murmur (Right sternal border) heard. Systolic murmur is present with a grade of 2/6. No friction rub. No gallop. Pulmonary:      Effort: Pulmonary effort is normal.      Breath sounds: Normal breath sounds. Abdominal:      General: Bowel sounds are normal. There is distension. Palpations: Abdomen is soft. Tenderness: There is no abdominal tenderness. There is no right CVA tenderness or left CVA tenderness. Musculoskeletal:         General: No tenderness. Normal range of motion. Right lower leg: Edema (3+ edema of both lower extremities) present. Left lower leg: Edema present. Right foot: Normal.      Left foot: Normal.   Skin:     Findings: No rash. Neurological:      Mental Status: She is alert and oriented to person, place, and time. Sensory: Sensation is intact. Motor: Motor function is intact. Psychiatric:         Behavior: Behavior is cooperative. Assessment:      Erica Noguera was seen today for follow-up, hypertension, hyperlipidemia and diabetes. Diagnoses and all orders for this visit:    Type 2 diabetes mellitus with stage 3b chronic kidney disease, with long-term current use of insulin ([facility])  -     Hemoglobin A1C; Future    Chronic diastolic heart failure (HCC)    Atherosclerosis of superior mesenteric artery ([facility])    Hypercoagulable state, secondary [facility]    Coronary artery disease of native artery of native heart with stable angina pectoris ([facility]    Hypokalemia    Other orders  -     calcitRIOL (ROCALTROL) 0.25 MCG capsule; 1 tablet Monday, Wednesday and Friday  -     metoclopramide (REGLAN) 5 MG tablet; Take 1 tablet by mouth 2 times daily (with meals)          Plan:      Reviewed labs and test results with patient. Maintain current dose of insulin therapy    With the congestive heart failure recommend patient take metolazone today and then start [address] medication tomorrow. It appears Jardiance would be better covered under insurance plan. She will be talking to cardiology in the next several weeks.     Maintain metoclopramide for gastroparesis    Continue with potassium supplementation    Patient received counseling on the following healthy behaviors: nutrition and exercise     Patient given educational materials     Health maintenance updated    Discussed use, benefit, and side effects of prescribed medications. Barriers to medication compliance addressed. All patient questions answered. Pt voiced understanding. Patient needs RTC in 2 months. Medical decision making of moderate complexity. Please note that this chart was generated using Dragon dictation software. Although every effort was made to ensure the accuracy of this automated transcription, some errors in transcription may have occurred. Name band;

## (undated) DEVICE — 60 ML SYRINGE,REGULAR TIP: Brand: MONOJECT

## (undated) DEVICE — STANDARD HYPODERMIC NEEDLE,POLYPROPYLENE HUB: Brand: MONOJECT

## (undated) DEVICE — STERILE POLYISOPRENE POWDER-FREE SURGICAL GLOVES: Brand: PROTEXIS

## (undated) DEVICE — DISPOSABLE OR TOWEL: Brand: CARDINAL HEALTH

## (undated) DEVICE — UNIVERSAL BLOCK TRAY: Brand: AVANOS*

## (undated) DEVICE — PEN: MARKING STD 100/CS: Brand: MEDICAL ACTION INDUSTRIES

## (undated) DEVICE — CHLORAPREP 26ML ORANGE

## (undated) DEVICE — Device: Brand: JELCO

## (undated) DEVICE — SOLUTION IV IRRIG WATER 500ML POUR BRL ST 2F7113

## (undated) DEVICE — BW-412T DISP COMBO CLEANING BRUSH: Brand: SINGLE USE COMBINATION CLEANING BRUSH

## (undated) DEVICE — FORCEPS BX L240CM WRK CHN 2.8MM STD CAP W/ NDL MIC MESH

## (undated) DEVICE — GOWN AURORA NONREINF LG: Brand: MEDLINE INDUSTRIES, INC.

## (undated) DEVICE — SYRINGE MED 3ML CLR PLAS STD N CTRL LUERLOCK TIP DISP

## (undated) DEVICE — MEDIA CONTRAST RX ISOVUE-300 61% 30ML VIALS

## (undated) DEVICE — NEEDLE SPNL 22GA L3.5IN BLK HUB S STL REG WALL FIT STYL W/

## (undated) DEVICE — NEEDLE EPI 22GA L2IN CLR HUB N WNG PERIFIX TUOHY

## (undated) DEVICE — MOUTHPIECE ENDOSCP L CTRL OPN AND SIDE PORTS DISP

## (undated) DEVICE — PROCEDURE KIT ENDOSCP CUST

## (undated) DEVICE — SET VLV 3 PC AWS DISPOSABLE GRDIAN SCOPEVALET